# Patient Record
Sex: FEMALE | Race: WHITE | NOT HISPANIC OR LATINO | Employment: OTHER | URBAN - METROPOLITAN AREA
[De-identification: names, ages, dates, MRNs, and addresses within clinical notes are randomized per-mention and may not be internally consistent; named-entity substitution may affect disease eponyms.]

---

## 2017-01-03 ENCOUNTER — APPOINTMENT (OUTPATIENT)
Dept: RADIOLOGY | Facility: MEDICAL CENTER | Age: 57
DRG: 388 | End: 2017-01-03
Attending: EMERGENCY MEDICINE
Payer: MEDICARE

## 2017-01-03 ENCOUNTER — RESOLUTE PROFESSIONAL BILLING HOSPITAL PROF FEE (OUTPATIENT)
Dept: HOSPITALIST | Facility: MEDICAL CENTER | Age: 57
End: 2017-01-03
Payer: MEDICARE

## 2017-01-03 ENCOUNTER — HOSPITAL ENCOUNTER (INPATIENT)
Facility: MEDICAL CENTER | Age: 57
LOS: 2 days | DRG: 388 | End: 2017-01-05
Attending: EMERGENCY MEDICINE | Admitting: HOSPITALIST
Payer: MEDICARE

## 2017-01-03 DIAGNOSIS — Z94.4 S/P LIVER TRANSPLANT (HCC): ICD-10-CM

## 2017-01-03 DIAGNOSIS — K56.609 SBO (SMALL BOWEL OBSTRUCTION) (HCC): ICD-10-CM

## 2017-01-03 LAB
ALBUMIN SERPL BCP-MCNC: 4.3 G/DL (ref 3.2–4.9)
ALBUMIN/GLOB SERPL: 2.2 G/DL
ALP SERPL-CCNC: 27 U/L (ref 30–99)
ALT SERPL-CCNC: 17 U/L (ref 2–50)
ANION GAP SERPL CALC-SCNC: 8 MMOL/L (ref 0–11.9)
AST SERPL-CCNC: 19 U/L (ref 12–45)
BASOPHILS # BLD AUTO: 0.4 % (ref 0–1.8)
BASOPHILS # BLD: 0.02 K/UL (ref 0–0.12)
BILIRUB SERPL-MCNC: 0.4 MG/DL (ref 0.1–1.5)
BUN SERPL-MCNC: 18 MG/DL (ref 8–22)
CALCIUM SERPL-MCNC: 9.9 MG/DL (ref 8.5–10.5)
CHLORIDE SERPL-SCNC: 99 MMOL/L (ref 96–112)
CO2 SERPL-SCNC: 30 MMOL/L (ref 20–33)
CREAT SERPL-MCNC: 1.21 MG/DL (ref 0.5–1.4)
EOSINOPHIL # BLD AUTO: 0.11 K/UL (ref 0–0.51)
EOSINOPHIL NFR BLD: 2.2 % (ref 0–6.9)
ERYTHROCYTE [DISTWIDTH] IN BLOOD BY AUTOMATED COUNT: 44.8 FL (ref 35.9–50)
GFR SERPL CREATININE-BSD FRML MDRD: 46 ML/MIN/1.73 M 2
GLOBULIN SER CALC-MCNC: 2 G/DL (ref 1.9–3.5)
GLUCOSE SERPL-MCNC: 135 MG/DL (ref 65–99)
HCT VFR BLD AUTO: 38.7 % (ref 37–47)
HGB BLD-MCNC: 12.9 G/DL (ref 12–16)
IMM GRANULOCYTES # BLD AUTO: 0.02 K/UL (ref 0–0.11)
IMM GRANULOCYTES NFR BLD AUTO: 0.4 % (ref 0–0.9)
LIPASE SERPL-CCNC: 10 U/L (ref 11–82)
LYMPHOCYTES # BLD AUTO: 1.01 K/UL (ref 1–4.8)
LYMPHOCYTES NFR BLD: 20.3 % (ref 22–41)
MCH RBC QN AUTO: 28.1 PG (ref 27–33)
MCHC RBC AUTO-ENTMCNC: 33.3 G/DL (ref 33.6–35)
MCV RBC AUTO: 84.3 FL (ref 81.4–97.8)
MONOCYTES # BLD AUTO: 0.35 K/UL (ref 0–0.85)
MONOCYTES NFR BLD AUTO: 7 % (ref 0–13.4)
NEUTROPHILS # BLD AUTO: 3.46 K/UL (ref 2–7.15)
NEUTROPHILS NFR BLD: 69.7 % (ref 44–72)
NRBC # BLD AUTO: 0 K/UL
NRBC BLD AUTO-RTO: 0 /100 WBC
PLATELET # BLD AUTO: 82 K/UL (ref 164–446)
PMV BLD AUTO: 8.9 FL (ref 9–12.9)
POTASSIUM SERPL-SCNC: 3.5 MMOL/L (ref 3.6–5.5)
PROT SERPL-MCNC: 6.3 G/DL (ref 6–8.2)
RBC # BLD AUTO: 4.59 M/UL (ref 4.2–5.4)
SODIUM SERPL-SCNC: 137 MMOL/L (ref 135–145)
WBC # BLD AUTO: 5 K/UL (ref 4.8–10.8)

## 2017-01-03 PROCEDURE — 99285 EMERGENCY DEPT VISIT HI MDM: CPT

## 2017-01-03 PROCEDURE — 700101 HCHG RX REV CODE 250: Performed by: HOSPITALIST

## 2017-01-03 PROCEDURE — 700111 HCHG RX REV CODE 636 W/ 250 OVERRIDE (IP): Performed by: HOSPITALIST

## 2017-01-03 PROCEDURE — 700111 HCHG RX REV CODE 636 W/ 250 OVERRIDE (IP): Performed by: NURSE PRACTITIONER

## 2017-01-03 PROCEDURE — 302128 INFUSION PUMP: Performed by: EMERGENCY MEDICINE

## 2017-01-03 PROCEDURE — 36415 COLL VENOUS BLD VENIPUNCTURE: CPT

## 2017-01-03 PROCEDURE — 700105 HCHG RX REV CODE 258: Performed by: EMERGENCY MEDICINE

## 2017-01-03 PROCEDURE — 304538 HCHG NG TUBE

## 2017-01-03 PROCEDURE — 96361 HYDRATE IV INFUSION ADD-ON: CPT

## 2017-01-03 PROCEDURE — 700111 HCHG RX REV CODE 636 W/ 250 OVERRIDE (IP): Performed by: EMERGENCY MEDICINE

## 2017-01-03 PROCEDURE — 99223 1ST HOSP IP/OBS HIGH 75: CPT | Mod: AI | Performed by: HOSPITALIST

## 2017-01-03 PROCEDURE — 85025 COMPLETE CBC W/AUTO DIFF WBC: CPT

## 2017-01-03 PROCEDURE — 96376 TX/PRO/DX INJ SAME DRUG ADON: CPT

## 2017-01-03 PROCEDURE — 83690 ASSAY OF LIPASE: CPT

## 2017-01-03 PROCEDURE — 96374 THER/PROPH/DIAG INJ IV PUSH: CPT

## 2017-01-03 PROCEDURE — 96375 TX/PRO/DX INJ NEW DRUG ADDON: CPT

## 2017-01-03 PROCEDURE — 700117 HCHG RX CONTRAST REV CODE 255: Performed by: EMERGENCY MEDICINE

## 2017-01-03 PROCEDURE — 770006 HCHG ROOM/CARE - MED/SURG/GYN SEMI*

## 2017-01-03 PROCEDURE — 74177 CT ABD & PELVIS W/CONTRAST: CPT

## 2017-01-03 PROCEDURE — 304561 HCHG STAT O2

## 2017-01-03 PROCEDURE — 80053 COMPREHEN METABOLIC PANEL: CPT

## 2017-01-03 RX ORDER — ONDANSETRON 4 MG/1
4 TABLET, ORALLY DISINTEGRATING ORAL EVERY 4 HOURS PRN
Status: DISCONTINUED | OUTPATIENT
Start: 2017-01-03 | End: 2017-01-05 | Stop reason: HOSPADM

## 2017-01-03 RX ORDER — MORPHINE SULFATE 10 MG/ML
10 INJECTION, SOLUTION INTRAMUSCULAR; INTRAVENOUS ONCE
Status: COMPLETED | OUTPATIENT
Start: 2017-01-03 | End: 2017-01-03

## 2017-01-03 RX ORDER — DIPHENHYDRAMINE HYDROCHLORIDE 50 MG/ML
25 INJECTION INTRAMUSCULAR; INTRAVENOUS ONCE
Status: COMPLETED | OUTPATIENT
Start: 2017-01-03 | End: 2017-01-03

## 2017-01-03 RX ORDER — FUROSEMIDE 40 MG/1
40 TABLET ORAL EVERY MORNING
COMMUNITY
End: 2017-07-06

## 2017-01-03 RX ORDER — ONDANSETRON 2 MG/ML
4 INJECTION INTRAMUSCULAR; INTRAVENOUS ONCE
Status: COMPLETED | OUTPATIENT
Start: 2017-01-03 | End: 2017-01-03

## 2017-01-03 RX ORDER — HYDROMORPHONE HYDROCHLORIDE 2 MG/ML
4 INJECTION, SOLUTION INTRAMUSCULAR; INTRAVENOUS; SUBCUTANEOUS EVERY 4 HOURS PRN
Status: DISCONTINUED | OUTPATIENT
Start: 2017-01-03 | End: 2017-01-03

## 2017-01-03 RX ORDER — LORAZEPAM 2 MG/ML
1 INJECTION INTRAMUSCULAR EVERY 4 HOURS PRN
Status: DISCONTINUED | OUTPATIENT
Start: 2017-01-03 | End: 2017-01-05 | Stop reason: HOSPADM

## 2017-01-03 RX ORDER — PROMETHAZINE HYDROCHLORIDE 25 MG/1
12.5-25 SUPPOSITORY RECTAL EVERY 4 HOURS PRN
Status: DISCONTINUED | OUTPATIENT
Start: 2017-01-03 | End: 2017-01-05 | Stop reason: HOSPADM

## 2017-01-03 RX ORDER — TADALAFIL 20 MG/1
40 TABLET ORAL
COMMUNITY
End: 2017-07-24 | Stop reason: SDUPTHER

## 2017-01-03 RX ORDER — SODIUM CHLORIDE AND POTASSIUM CHLORIDE 150; 900 MG/100ML; MG/100ML
INJECTION, SOLUTION INTRAVENOUS CONTINUOUS
Status: DISCONTINUED | OUTPATIENT
Start: 2017-01-03 | End: 2017-01-05 | Stop reason: HOSPADM

## 2017-01-03 RX ORDER — MORPHINE SULFATE 4 MG/ML
1-2 INJECTION, SOLUTION INTRAMUSCULAR; INTRAVENOUS EVERY 4 HOURS PRN
Status: DISCONTINUED | OUTPATIENT
Start: 2017-01-03 | End: 2017-01-04

## 2017-01-03 RX ORDER — DOCUSATE SODIUM 100 MG/1
100 CAPSULE, LIQUID FILLED ORAL 2 TIMES DAILY
COMMUNITY
End: 2017-12-29

## 2017-01-03 RX ORDER — AMOXICILLIN 500 MG/1
2000 CAPSULE ORAL ONCE
Status: ON HOLD | COMMUNITY
End: 2017-01-05

## 2017-01-03 RX ORDER — PROMETHAZINE HYDROCHLORIDE 25 MG/1
12.5-25 TABLET ORAL EVERY 4 HOURS PRN
Status: DISCONTINUED | OUTPATIENT
Start: 2017-01-03 | End: 2017-01-05 | Stop reason: HOSPADM

## 2017-01-03 RX ORDER — ONDANSETRON 2 MG/ML
4 INJECTION INTRAMUSCULAR; INTRAVENOUS EVERY 4 HOURS PRN
Status: DISCONTINUED | OUTPATIENT
Start: 2017-01-03 | End: 2017-01-05 | Stop reason: HOSPADM

## 2017-01-03 RX ORDER — SODIUM CHLORIDE 9 MG/ML
1000 INJECTION, SOLUTION INTRAVENOUS ONCE
Status: COMPLETED | OUTPATIENT
Start: 2017-01-03 | End: 2017-01-03

## 2017-01-03 RX ORDER — AMBRISENTAN 10 MG/1
10 TABLET, FILM COATED ORAL DAILY
COMMUNITY
End: 2017-01-11 | Stop reason: SDUPTHER

## 2017-01-03 RX ORDER — MYCOPHENOLATE MOFETIL 250 MG/1
500 CAPSULE ORAL EVERY 12 HOURS
Status: ON HOLD | COMMUNITY
End: 2021-08-03

## 2017-01-03 RX ADMIN — IOHEXOL 100 ML: 350 INJECTION, SOLUTION INTRAVENOUS at 07:52

## 2017-01-03 RX ADMIN — HYDROMORPHONE HYDROCHLORIDE 1 MG: 1 INJECTION, SOLUTION INTRAMUSCULAR; INTRAVENOUS; SUBCUTANEOUS at 07:16

## 2017-01-03 RX ADMIN — ENOXAPARIN SODIUM 40 MG: 100 INJECTION SUBCUTANEOUS at 17:24

## 2017-01-03 RX ADMIN — POTASSIUM CHLORIDE AND SODIUM CHLORIDE: 900; 150 INJECTION, SOLUTION INTRAVENOUS at 16:53

## 2017-01-03 RX ADMIN — ONDANSETRON 4 MG: 2 INJECTION, SOLUTION INTRAMUSCULAR; INTRAVENOUS at 11:35

## 2017-01-03 RX ADMIN — SODIUM CHLORIDE 1000 ML: 9 INJECTION, SOLUTION INTRAVENOUS at 06:35

## 2017-01-03 RX ADMIN — MORPHINE SULFATE 2 MG: 4 INJECTION INTRAVENOUS at 20:41

## 2017-01-03 RX ADMIN — HYDROMORPHONE HYDROCHLORIDE 1 MG: 1 INJECTION, SOLUTION INTRAMUSCULAR; INTRAVENOUS; SUBCUTANEOUS at 06:35

## 2017-01-03 RX ADMIN — DIPHENHYDRAMINE HYDROCHLORIDE 25 MG: 50 INJECTION INTRAMUSCULAR; INTRAVENOUS at 22:54

## 2017-01-03 RX ADMIN — MORPHINE SULFATE 2 MG: 4 INJECTION INTRAVENOUS at 15:11

## 2017-01-03 RX ADMIN — LORAZEPAM 1 MG: 2 INJECTION INTRAMUSCULAR; INTRAVENOUS at 20:48

## 2017-01-03 RX ADMIN — MORPHINE SULFATE 5 MG: 10 INJECTION INTRAVENOUS at 11:37

## 2017-01-03 RX ADMIN — ONDANSETRON 4 MG: 2 INJECTION, SOLUTION INTRAMUSCULAR; INTRAVENOUS at 06:35

## 2017-01-03 RX ADMIN — HYDROCORTISONE SODIUM SUCCINATE 50 MG: 100 INJECTION, POWDER, FOR SOLUTION INTRAMUSCULAR; INTRAVENOUS at 20:38

## 2017-01-03 RX ADMIN — MORPHINE SULFATE 5 MG: 10 INJECTION INTRAVENOUS at 08:30

## 2017-01-03 ASSESSMENT — PAIN SCALES - GENERAL
PAINLEVEL_OUTOF10: 7
PAINLEVEL_OUTOF10: 8
PAINLEVEL_OUTOF10: 10
PAINLEVEL_OUTOF10: 7

## 2017-01-03 ASSESSMENT — ENCOUNTER SYMPTOMS
COUGH: 0
SHORTNESS OF BREATH: 1
NAUSEA: 1
DIARRHEA: 1
VOMITING: 1
ABDOMINAL PAIN: 1

## 2017-01-03 ASSESSMENT — LIFESTYLE VARIABLES
ALCOHOL_USE: NO
EVER_SMOKED: NEVER

## 2017-01-03 NOTE — ED NOTES
Pt with emesis, NG irrigated , + gastric contents aspirated, NG to LCWS at this time with gastric contents noted

## 2017-01-03 NOTE — ED NOTES
"Another call placed to [facility], \"Kylie: @ [phone], .  Advised Dr. Lee Ann tovar for transfer that isn't emergent can take days and to admit pt in interim is advised.  "

## 2017-01-03 NOTE — ED NOTES
"Med rec updated and complete  Allergies reviewed  Pts  had a medication list at bedside, went over medications, returned medication list back to pts .  Pts  states \"I brought in her LETAIRIS 10MG and ADCIRCA 20MG\".      "

## 2017-01-03 NOTE — IP AVS SNAPSHOT
[employer] Access Code: Activation code not generated  Current [employer] Status: Active    WEMShart  A secure, online tool to manage your health information     [employer]’s [employer]® is a secure, online tool that connects you to your personalized health information from the privacy of your home -- day or night - making it very easy for you to manage your healthcare. Once the activation process is completed, you can even access your medical information using the [employer] kacy, which is available for free in the Apple Kacy store or Google Play store.     [employer] provides the following levels of access (as shown below):   My Chart Features   [facility] Primary Care Doctor [facility]  Specialists [facility]  Urgent  Care Non-[facility]  Primary Care  Doctor   Email your healthcare team securely and privately 24/7 X X X X   Manage appointments: schedule your next appointment; view details of past/upcoming appointments X      Request prescription refills. X      View recent personal medical records, including lab and immunizations X X X X   View health record, including health history, allergies, medications X X X X   Read reports about your outpatient visits, procedures, consult and ER notes X X X X   See your discharge summary, which is a recap of your hospital and/or ER visit that includes your diagnosis, lab results, and care plan. X X       How to register for [employer]:  1. Go to  [email or web address].  2. Click on the Sign Up Now box, which takes you to the New Member Sign Up page. You will need to provide the following information:  a. Enter your [employer] Access Code exactly as it appears at the top of this page. (You will not need to use this code after you’ve completed the sign-up process. If you do not sign up before the expiration date, you must request a new code.)   b. Enter your date of birth.   c. Enter your home email address.   d. Click Submit, and follow the next screen’s instructions.  3. Create a [employer] ID. This will  be your EVOFEM login ID and cannot be changed, so think of one that is secure and easy to remember.  4. Create a EV[employer]EM password. You can change your password at any time.  5. Enter your Password Reset Question and Answer. This can be used at a later time if you forget your password.   6. Enter your e-mail address. This allows you to receive e-mail notifications when new information is available in EVOFEM.  7. Click Sign Up. You can now view your health information.    For assistance activating your EV[employer]EM account, call [phone]

## 2017-01-03 NOTE — ED NOTES
Have updated  x 3 regarding delays, brought ice water to him, offered coffee and a meal. He is very frustrated with delays.  Pt has been vomiting, have disposed of soiled bags, offered encouragement,  Cold wash cloth and mouth swabs have been provided.  Update with admitting MD that he is currently reviewing MR and pt and  made aware of that.

## 2017-01-03 NOTE — ED NOTES
Pt was to be transferred , now going to be admitted here, awaiting admit orders , pt and  updated on plan of care

## 2017-01-03 NOTE — CONSULTS
DATE OF SERVICE:  01/03/2017    REQUESTING PHYSICIAN:  Chiquita Nance DO, MD, ER physician, [facility].    REASON FOR CONSULTATION:  Possible early small-bowel obstruction.    HISTORY OF PRESENT ILLNESS:  The patient is a 56-year-old female with a   complicated medical and surgical history.  She states that she began   experiencing severe abdominal pain last night periumbilical in nature.  She   states she had chills and a low grade fever to 100 measured only in the   hospital, she states she has had multiple episodes of vomiting green and   yellow material.  She states she had diarrhea over 2 over the last 4 days,   although she has had no bowel movement in the last 2 days.  She states,   however, that she is still passing flatus.  She has had no previous episodes   of similar complaints and no one else is sick at home.  She presented to the   ER where a CT scan showed evidence of a small-bowel obstruction.    PAST MEDICAL HISTORY:  1.  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  2.  Hypoxia.  3.  Aortic valve stenosis for which she is being followed by Dr. Li.  4.  Orthostatic hypotension.  5.  Hypertension.  6.  Pulmonary hypertension.  7.  3 episodes of systemic inflammatory response syndrome.  8.  Sarcoidosis.  9.  History of septic shock in 2012 for an unknown cause.  10.  Multiple episodes of pneumonia.  11.  Hospital-acquired VRE and Clostridium difficile.  12.  An enlarged heart.  13.  An enlarged spleen.  14.  Type 2 diabetes.  15.  Chronic nausea and vomiting.  16.  Chronic hip, spine, and head pain.  17.  Chronic fatigue.    ALLERGIES:  No known drug allergies.    PAST SURGICAL HISTORY:  She had a liver transplant in 2011 due to either   alcohol or sarcoidosis.  She had a gastric sleeve performed in 2015.  She had   a right heart catheterization performed in 2014.  She has a liver biopsy in    2012.  She has had a hemorrhoidectomy in 2010 and a cholecystectomy in 2010   and an umbilical hernia repair x3 in the past, sinus repair x3.  She has had a   breast reduction, and carpal tunnel release.    MEDICATIONS:  1.  Tadalafil.  2.  Aspirin.  3.  CellCept.  4.  Cyclobenzaprine.  5.  Flexeril.  6.  Dilaudid 8 mg tablets 1 p.o. 3 times per day.  7.  Flonase.  8.  Gabapentin.  9.  Lantus insulin.  10.  Humalog insulin.  11.  Letairis.  12.  Lasix.  13.  Levothyroxine.  14.  Oxycodone 10-30 mg p.o. p.r.n. pain.  She states she takes this 3 times   per day.  15.  Pravastatin.  16.  Prednisone.  17.  Prilosec.  18.  Prograf.  19.  Spiriva.  20.  Trazodone.  21.  Xanax.  22.  Zofran.  23.  Multiple over-the-counter medications.    SOCIAL HISTORY:  She denies any history of smoking.  She states she quit   alcohol use 8 years ago.  She is somewhat vague in how much alcohol she drank   prior to quitting.  She denies IV drug use.  She states she history previous    of [facility].  She is accompanied by her .    FAMILY HISTORY:  Noncontributory.    REVIEW OF SYSTEMS:  A 16-point review of systems is negative except as noted   in the HPI.    PHYSICAL EXAMINATION:  VITAL SIGNS:  Temperature at 05:05 this morning was 37.4, blood pressure   120/64 at 11:00, pulse 83 at 11:00, O2 saturation is 94% on 2 liters via nasal   cannula.  GENERAL:  She is in no acute distress and appears her stated age.  HEENT:  Pupils equal, round, reactive to light.  She has moist oral mucosa and   adequate upper and lower dentition.  NECK:  Supple.  No JVD, no lymphadenopathy, no thyromegaly.  LUNGS:  Clear to auscultation bilaterally with normal bilateral chest rise.  HEART:  Has a regular rate and rhythm.  No murmurs, gallops or rubs are   appreciated.  ABDOMEN:  Nondistended, has positive decreased bowel sounds, is soft and is   minimally tender throughout.  She has no umbilical, or epigastric, or inguinal   hernias appreciated on  exam.  RECTAL:  Deferred.  PELVIC:  Deferred.  EXTREMITIES:  1+ pulses in all 4 extremities.  No clubbing, cyanosis or edema.  NEUROLOGIC:  Cranial nerves II-XII are grossly intact.  She has no focal   deficits and her gait is not examined.    LABORATORY DATA:  CBC obtained this morning at 05:13 shows no elevation in her   white blood cell count and no left shift.  She does have an MCHC which is low   at 33.3.  Her platelet count is low at 82,000.  A complete metabolic panel   was remarkable for a low potassium at 3.5 and elevated blood glucose at 135   and a low alkaline phosphatase at 27 and a low lipase at 20.  CT of the   abdomen and pelvis shows small bowel dilatation with evidence of small bowel   stasis in the proximal jejunum transition point in the left upper quadrant   suggestive of at least a partial small-bowel obstruction.    ASSESSMENT:  A 56-year-old female with multiple medical and surgical problems   to include constricted bronchiolitis, chronic hypoxia, aortic valve stenosis,   orthostatic hypotension, hypertension, pulmonary hypertension, sarcoidosis, an   enlarged heart, an enlarged spleen, type 2 diabetes, chronic nausea and   vomiting, chronic fatigue, chronic hip, spine, and head pain now noted to have   thrombocytopenia, hypokalemia, and evidence of at least a partial small-bowel   obstruction.  She has also undergone multiple abdominal surgeries including   most importantly a liver transplant in 2011 due to her liver transplant she is   on multiple antirejection drugs.  She states that she does not trust [facility] as she states that multiple mistakes had been made in   her care in the past.  She would like to go to [facility] to be treated.  I think   especially given her very complicated surgical and medical history and the   fact that we do not have a dedicated transplant team here to manage her   antirejection medications that this is reasonable plan and I recommend that    she be transferred to [facility], if they are willing to take her.    PLAN:  I will discuss again with the ER physician.  My recommendation that she   be transferred down to [facility].  I will be available if she is not able to be   transferred there, if she needs to be placed on the medicine service with   surgical consultation.       ____________________________________     MD ANDREW MONTELONGO / SAVAGE    DD:  01/03/2017 11:32:13  DT:  01/03/2017 12:44:20    D#:  [id number]  Job#:  [id number]

## 2017-01-03 NOTE — IP AVS SNAPSHOT
" <p align=\"LEFT\"><IMG SRC=\"//EMRWB/blob$/[email or web address]\" alt=\"Image\" WIDTH=\"50%\" HEIGHT=\"200\" BORDER=\"\"></p>                   Name:Roxana Cuba  Medical Record Number:[id number]  CSN: [id number]    YOB: 1960   Age: 56 y.o.  Sex: female  HT:1.727 m (5' 8\") WT: 80.74 kg (178 lb)          Admit Date: 1/3/2017     Discharge Date:   Today's Date: 1/5/2017  Attending Doctor:  Sebas Odell M.D.                  Allergies:  Rhubarb and Vancomycin hcl          Your appointments     Jan 11, 2017  8:00 AM   Adult Draw/Collection with [facility]   [facility] (--)    [address]  [place] [address]   [phone]            Jan 16, 2017 10:15 AM   ECHO with ECHO [facility]   ECHOCARDIOLOGY [facility]    [address]  [place] [address]   [phone]           No prep            Jan 18, 2017  8:30 AM   MA DX30 with S [facility]CCARR[facility] MG 1   [facility] IMAGING [facility]    [address]  [place] [address]   [phone]            Jan 20, 2017  1:20 PM   Access New To You with Bernarda Reyes D.O.   [facility])    [facility], [address]  [place] [id number] [phone]            Feb 09, 2017  1:00 PM   Diabetes Care Visit with Bernarda Reyes D.O., [facility] DIABETES RN   [facility])    [address] [place] [address], [address]  [place] [id number] [phone]           You will be receiving a confirmation call a few days before your appointment from our automated call confirmation system.            Feb 15, 2017  8:00 AM   Adult Draw/Collection with LAB [facility]   [facility] (--)    [address]  [place] [address]   [phone]            Mar 08, 2017  8:00 AM   Adult Draw/Collection with [facility]   [facility] (--)    [address]  [place] [address]   [phone]            Apr 03, 2017 10:30 AM   FOLLOW UP with Maxwell Phan M.D.   [facility] (--)    [address] , [facility] [address]  [place] [id number] [phone]              Follow-up Information     1. Follow up with Bernarda Reyes D.O..    Specialty:  Family Medicine    Contact information    [address]  [address]  [place] [address] [phone]          2. Follow up with Bernarda Reyes D.O..    Specialty:  Family Medicine    Contact information    [address]  [place] [address] [phone]           Medication List      Take these Medications        Instructions    ADCIRCA 20 MG Tabs   Generic drug:  Tadalafil (PAH)    Take 40 mg by mouth every evening.   Dose:  40 mg       aspirin EC 81 MG Tbec   Commonly known as:  ECOTRIN    Take 81 mg by mouth every morning.   Dose:  81 mg       BARIATRIC FUSION Chew    Take 1 Tab by mouth every evening.   Dose:  1 Tab       CELLCEPT 250 MG Caps   Generic drug:  mycophenolate    Take 500 mg by mouth 2 times a day.   Dose:  500 mg       CITRACAL + D PO    Take 1 Tab by mouth 2 Times a Day. @@ 0700 and 1800   Dose:  1 Tab       cyclobenzaprine 10 MG Tabs   Commonly known as:  FLEXERIL    TAKE 1 TABLET BY MOUTH 3 TIMES A DAY AS NEEDED FOR MUSCLE SPASMS       docusate sodium 100 MG Caps   Commonly known as:  COLACE    Take 100 mg by mouth 2 times a day.   Dose:  100 mg       furosemide 40 MG Tabs   Commonly known as:  LASIX    Take 20 mg by mouth every day.   Dose:  20 mg       gabapentin 600 MG tablet   Commonly known as:  NEURONTIN    Doctor's comments:  Authorization of a refill request.   TAKE 1 TABLET BY MOUTH 3 TIMES A DAY       insulin glargine 100 UNIT/ML Soln   Commonly known as:  LANTUS    Inject 8 Units as instructed every morning.   Dose:  8 Units       LETAIRIS 10 MG tablet   Generic drug:  ambrisentan    Take 10 mg by mouth every day.   Dose:  10 mg       levothyroxine 137 MCG Tabs   Commonly known as:  SYNTHROID    Doctor's comments:  This is an authorization for a requested refill   TAKE 1 TABLET BY MOUTH  EVERY DAY       omeprazole 40 MG delayed-release capsule   Commonly known as:  PRILOSEC    Take 1 Cap by mouth every day.   Dose:  40 mg       ondansetron 4 MG Tbdp   What changed:    - reasons to take this  - additional instructions   Commonly known as:  ZOFRAN ODT    Take 1 Tab by mouth every 8 hours as needed. Nausea and vomiting   Dose:  4 mg       oxycodone immediate release 10 MG immediate release tablet   Commonly known as:  ROXICODONE    Take 10 mg by mouth 3 times a day. @0700, 1200, and 2000   Dose:  10 mg       polyethylene glycol 3350 Powd   Commonly known as:  MIRALAX    Take 17 g by mouth every day.   Dose:  17 g       pravastatin 20 MG Tabs   Commonly known as:  PRAVACHOL    Take 1 Tab by mouth every day.   Dose:  20 mg       * PredniSONE 2 MG Tbec   What changed:  Another medication with the same name was changed. Make sure you understand how and when to take each.    Take 2 mg by mouth every day. Pt has another RX for 5MG Pt takes total of 7MG   Dose:  2 mg       * predniSONE 5 MG Tabs   What changed:    - how much to take  - how to take this  - when to take this  - additional instructions   Commonly known as:  SHAHEED    Doctor's comments:  In addition to prednisone 1 mg prescription   1 to 4 tabs by mouth daily as directed       PROBIOTIC DAILY PO    Take 1 Cap by mouth every evening.   Dose:  1 Cap       sennosides 8.6 MG Tabs   Commonly known as:  SENOKOT    Take 17.2 mg by mouth 2 times a day.   Dose:  17.2 mg       sertraline 100 MG Tabs   Commonly known as:  ZOLOFT    Take 1 Tab by mouth every evening.   Dose:  100 mg       tacrolimus 1 MG Caps   Commonly known as:  PROGRAF    Take 1.5-2 mg by mouth 2 times a day. Pt takes: 2MG in AM 1.5MG HS   Dose:  1.5-2 mg       trazodone 50 MG Tabs   What changed:    - when to take this  - reasons to take this   Commonly known as:  DESYREL    Take 1-2 Tabs by mouth every bedtime.   Dose:   mg       * Notice:  This list has 2 medication(s) that  are the same as other medications prescribed for you. Read the directions carefully, and ask your doctor or other care provider to review them with you.

## 2017-01-03 NOTE — ED NOTES
"Late entry.  Approx 0845 Dr. Nance informs a transfer to [facility] may be needed.  Call to \"Kirsty\" @ [facility]/HHP, 5762, LM and basic info, await call back.  Pt updated on POC, she is agitated, upset,  @ bedside.  Dr. Nance updated, primary RN \"aJne\" updated.  "

## 2017-01-03 NOTE — ED NOTES
"Pt with multiple requests for pain medication, pt's O2 sats falling below 82% intermittently on 6L when pt dozes off , ERP aware , pt states \" this happens all the time , it's normal for me \" , pt\" wanting more pain medication regardless\",   "

## 2017-01-03 NOTE — ED NOTES
"Pt medicated as ordered, pt demanding \"more pain medication if needed\" , explained to pt her low O2 sats and increased O2 requirements , pt states \" I don't fucking care, I would rather be intubated than be in pain\" , this RN explained to pt that I could only medicate as the ERP has prescribed, pt states \" I don't care, I will request pain medication as often as I want\"   "

## 2017-01-03 NOTE — ED NOTES
"Pt on call light, on entering pt is crying in pain.  Informed pt that she received pain medication not to long ago and is requiring increased oxygen to maintain saturations.  Pt states that she is in too much pain and has lots of questions and would like to see the doctor.  Pt informed that she has a bowel obstructions and needs an NG tube, pt agitated by this and is asking why they \"can't just operate\". Pt also wants to know when \"i will be transferring to [facility]\"  I informed her that I will get our  to talked to her about transfer  "

## 2017-01-03 NOTE — ED NOTES
Pt's  out to nurses station requesting pt to take her home medications, pt mota stanford NG tube in place and is NPO , I addressed the pt's concerns with  who asked that I speak with pharmacy regarding availability of home meds in non oral form. Armaan from pharmacy looking into pt's medications

## 2017-01-03 NOTE — ED NOTES
Chief Complaint   Patient presents with   • RUQ Pain     Pt reports symptoms started last night.   • Nausea/Vomiting/Diarrhea     /56 mmHg  Pulse 75  Temp(Src) 37.4 °C (99.3 °F)  Resp 18  Wt 80.74 kg (178 lb)  SpO2 99%    Pt brought in by MAYRA from home, pt currently taking anti-rejection medication s/p liver transplant. PIV line established pta. Placed in room RD 3. Complaints and vitals as above. Pt on monitors, all alarms audible. Chart flagged for ERP to see.

## 2017-01-03 NOTE — IP AVS SNAPSHOT
" After Visit Summary                                                                                                                  Name:Roxana Cuba  Medical Record Number:[id number]  CSN: [id number]    YOB: 1960   Age: 56 y.o.  Sex: female  HT:1.727 m (5' 8\") WT: 80.74 kg (178 lb)          Admit Date: 1/3/2017     Discharge Date:   Today's Date: 1/5/2017  Attending Doctor:  Sebas Odell M.D.                  Allergies:  Rhubarb and Vancomycin hcl            Discharge Instructions       Discharge Instructions    Discharged to home by car with relative. Discharged via wheelchair, hospital escort: Yes.  Special equipment needed: Oxygen    Be sure to schedule a follow-up appointment with your primary care doctor or any specialists as instructed.     Discharge Plan:   Diet Plan: Discussed (warm liquids today increase as tolerated)  Activity Level: Discussed (no strenuous activity)  Confirmed Follow up Appointment: Patient to Call and Schedule Appointment  Confirmed Symptoms Management: Discussed  Medication Reconciliation Updated: Yes  Pneumococcal Vaccine Given - only chart on this line when given: Given (See MAR)  Influenza Vaccine Indication: Not indicated: Previously immunized this influenza season and > 8 years of age    I understand that a diet low in cholesterol, fat, and sodium is recommended for good health. Unless I have been given specific instructions below for another diet, I accept this instruction as my diet prescription.   Other diet: warm liquid diet today and increase as tolerated    Special Instructions: None    · Is patient discharged on Warfarin / Coumadin?   No     · Is patient Post Blood Transfusion?  No  Follow up with jose PISANO As needed or return to the ED for return of symptoms, be sure to continue all laxatives to keep your bowels moving.     Depression / Suicide Risk    As you are discharged from this [facility] facility, it is important to learn how to keep safe from " harming yourself.    Recognize the warning signs:  · Abrupt changes in personality, positive or negative- including increase in energy   · Giving away possessions  · Change in eating patterns- significant weight changes-  positive or negative  · Change in sleeping patterns- unable to sleep or sleeping all the time   · Unwillingness or inability to communicate  · Depression  · Unusual sadness, discouragement and loneliness  · Talk of wanting to die  · Neglect of personal appearance   · Rebelliousness- reckless behavior  · Withdrawal from people/activities they love  · Confusion- inability to concentrate     If you or a loved one observes any of these behaviors or has concerns about self-harm, here's what you can do:  · Talk about it- your feelings and reasons for harming yourself  · Remove any means that you might use to hurt yourself (examples: pills, rope, extension cords, firearm)  · Get professional help from the community (Mental Health, Substance Abuse, psychological counseling)  · Do not be alone:Call your Safe Contact- someone whom you trust who will be there for you.  · Call your local CRISIS HOTLINE 103-7365 or 614-453-4309  · Call your local Children's Mobile Crisis Response Team [place] (384) 030-5546 or www.[employer]Mark[employer]  · Call the toll free National Suicide Prevention Hotlines   · National Suicide Prevention Lifeline 396-914-TGVQ (7883)  · National Hope Line Network 800-SUICIDE (699-0627)        Your appointments     Jan 11, 2017  8:00 AM   Adult Draw/Collection with [facility]   [facility] (--)    [address]  [place] [address]   [phone]            Jan 16, 2017 10:15 AM   ECHO with ECHO [facility]   ECHOCARDIOLOGY [facility])    [address]  [place] [address]   [phone]           No prep            Jan 18, 2017  8:30 AM   MA DX30 with ED PAREDES MG 1   [facility] ([place])    [address]  [place] [address]      [phone]            Jan 20, 2017  1:20 PM   Access New To You with Bernarda Reyes D.O.   [facility])    [address], [address]  [place] [address]   [phone]            Feb 09, 2017  1:00 PM   Diabetes Care Visit with Bernarda Reyes D.O., [facility] OLSEN DIABETES RN   [facility])    [facility], [address]  [place] [address] [phone]           You will be receiving a confirmation call a few days before your appointment from our automated call confirmation system.            Feb 15, 2017  8:00 AM   Adult Draw/Collection with LAB [facility]   [facility] (--)    [address]  [place] [address]   [phone]            Mar 08, 2017  8:00 AM   Adult Draw/Collection with LAB [facility]   [facility] (--)    [address]  [place] [address]   [phone]            Apr 03, 2017 10:30 AM   FOLLOW UP with Maxwell Phan M.D.   [facility] (--)    [address] [place] [address] [address]  [place] [id number] [phone]              Follow-up Information     1. Follow up with Bernarda Reyes D.O..    Specialty:  Family Medicine    Contact information    [address] [place] [address]  [address]  [place] [address] [phone]          2. Follow up with Bernarda Reyes D.O..    Specialty:  Family Medicine    Contact information    [address] [place] [address] [address]  [place] [address] [phone]           Discharge Medication Instructions:    Below are the medications your physician expects you to take upon discharge:    Review all your home medications and newly ordered medications with your doctor and/or pharmacist. Follow medication instructions as directed by your doctor and/or pharmacist.    Please keep your medication list with you and share with your physician.               Medication List      CHANGE how you take these medications        Instructions    ondansetron 4 MG Tbdp   What changed:    -  reasons to take this  - additional instructions   Commonly known as:  ZOFRAN ODT   Next Dose Due:  As needed    Take 1 Tab by mouth every 8 hours as needed. Nausea and vomiting   Dose:  4 mg       * PredniSONE 2 MG Tbec   What changed:  Another medication with the same name was changed. Make sure you understand how and when to take each.   Next Dose Due:  today    Take 2 mg by mouth every day. Pt has another RX for 5MG Pt takes total of 7MG   Dose:  2 mg       * predniSONE 5 MG Tabs   What changed:    - how much to take  - how to take this  - when to take this  - additional instructions   Last time this was given:  7 mg on 1/4/2017  1:07 PM   Commonly known as:  DELTASONE   Next Dose Due:  Itoday    Doctor's comments:  In addition to prednisone 1 mg prescription   1 to 4 tabs by mouth daily as directed       trazodone 50 MG Tabs   What changed:    - when to take this  - reasons to take this   Commonly known as:  DESYREL   Next Dose Due:  tonight    Take 1-2 Tabs by mouth every bedtime.   Dose:   mg       * Notice:  This list has 2 medication(s) that are the same as other medications prescribed for you. Read the directions carefully, and ask your doctor or other care provider to review them with you.      CONTINUE taking these medications        Instructions    ADCIRCA 20 MG Tabs   Last time this was given:  40 mg on 1/4/2017  9:00 PM   Generic drug:  Tadalafil (PAH)   Next Dose Due:  tonight    Take 40 mg by mouth every evening.   Dose:  40 mg       aspirin EC 81 MG Tbec   Commonly known as:  ECOTRIN   Next Dose Due:  In am    Take 81 mg by mouth every morning.   Dose:  81 mg       BARIATRIC FUSION Chew   Next Dose Due:  tonight    Take 1 Tab by mouth every evening.   Dose:  1 Tab       CELLCEPT 250 MG Caps   Last time this was given:  500 mg on 1/5/2017  8:36 AM   Generic drug:  mycophenolate   Next Dose Due:  tonight    Take 500 mg by mouth 2 times a day.   Dose:  500 mg       CITRACAL + D PO   Next Dose  Due:  today    Take 1 Tab by mouth 2 Times a Day. @@ 0700 and 1800   Dose:  1 Tab       cyclobenzaprine 10 MG Tabs   Last time this was given:  10 mg on 1/5/2017  8:35 AM   Commonly known as:  FLEXERIL   Next Dose Due:  As needed    TAKE 1 TABLET BY MOUTH 3 TIMES A DAY AS NEEDED FOR MUSCLE SPASMS       docusate sodium 100 MG Caps   Commonly known as:  COLACE   Next Dose Due:  tonight    Take 100 mg by mouth 2 times a day.   Dose:  100 mg       furosemide 40 MG Tabs   Commonly known as:  LASIX   Next Dose Due:  today    Take 20 mg by mouth every day.   Dose:  20 mg       gabapentin 600 MG tablet   Commonly known as:  NEURONTIN   Next Dose Due:  As needed    Doctor's comments:  Authorization of a refill request.   TAKE 1 TABLET BY MOUTH 3 TIMES A DAY       insulin glargine 100 UNIT/ML Soln   Commonly known as:  LANTUS   Next Dose Due:  In am    Inject 8 Units as instructed every morning.   Dose:  8 Units       LETAIRIS 10 MG tablet   Last time this was given:  10 mg on 1/5/2017  9:00 AM   Generic drug:  ambrisentan    Take 10 mg by mouth every day.   Dose:  10 mg       levothyroxine 137 MCG Tabs   Last time this was given:  137 mcg on 1/5/2017  8:35 AM   Commonly known as:  SYNTHROID   Next Dose Due:  tomorrow    Doctor's comments:  This is an authorization for a requested refill   TAKE 1 TABLET BY MOUTH EVERY DAY       omeprazole 40 MG delayed-release capsule   Last time this was given:  40 mg on 1/5/2017  8:36 AM   Commonly known as:  PRILOSEC   Next Dose Due:  In am    Take 1 Cap by mouth every day.   Dose:  40 mg       oxycodone immediate release 10 MG immediate release tablet   Commonly known as:  ROXICODONE   Next Dose Due:  As needed    Take 10 mg by mouth 3 times a day. @0700, 1200, and 2000   Dose:  10 mg       polyethylene glycol 3350 Powd   Commonly known as:  MIRALAX   Next Dose Due:  As needed    Take 17 g by mouth every day.   Dose:  17 g       pravastatin 20 MG Tabs   Commonly known as:  PRAVACHOL      Next Dose Due:  tonight    Take 1 Tab by mouth every day.   Dose:  20 mg       PROBIOTIC DAILY PO   Next Dose Due:  As needed    Take 1 Cap by mouth every evening.   Dose:  1 Cap       sennosides 8.6 MG Tabs   Commonly known as:  SENOKOT   Next Dose Due:  needed    Take 17.2 mg by mouth 2 times a day.   Dose:  17.2 mg       sertraline 100 MG Tabs   Last time this was given:  100 mg on 1/4/2017  9:03 PM   Commonly known as:  ZOLOFT   Next Dose Due:  tonight    Take 1 Tab by mouth every evening.   Dose:  100 mg       tacrolimus 1 MG Caps   Last time this was given:  2 mg on 1/5/2017  8:38 AM   Commonly known as:  PROGRAF   Next Dose Due:  tonight    Take 1.5-2 mg by mouth 2 times a day. Pt takes: 2MG in AM 1.5MG HS   Dose:  1.5-2 mg         STOP taking these medications     amoxicillin 500 MG Caps   Commonly known as:  AMOXIL       HYDROmorphone 8 MG tablet   Commonly known as:  DILAUDID               Instructions           Diet / Nutrition:    Follow any diet instructions given to you by your doctor or the dietician, including how much salt (sodium) you are allowed each day.    If you are overweight, talk to your doctor about a weight reduction plan.    Activity:    Remain physically active following your doctor's instructions about exercise and activity.    Rest often.     Any time you become even a little tired or short of breath, SIT DOWN and rest.    Worsening Symptoms:    Report any of the following signs and symptoms to the doctor's office immediately:    *Pain of jaw, arm, or neck  *Chest pain not relieved by medication                               *Dizziness or loss of consciousness  *Difficulty breathing even when at rest   *More tired than usual                                       *Bleeding drainage or swelling of surgical site  *Swelling of feet, ankles, legs or stomach                 *Fever (>100ºF)  *Pink or blood tinged sputum  *Weight gain (3lbs/day or 5lbs /week)           *Shock from internal  defibrillator (if applicable)  *Palpitations or irregular heartbeats                *Cool and/or numb extremities    Stroke Awareness    Common Risk Factors for Stroke include:    Age  Atrial Fibrillation  Carotid Artery Stenosis  Diabetes Mellitus  Excessive alcohol consumption  High blood pressure  Overweight   Physical inactivity  Smoking    Warning signs and symptoms of a stroke include:    *Sudden numbness or weakness of the face, arm or leg (especially on one side of the body).  *Sudden confusion, trouble speaking or understanding.  *Sudden trouble seeing in one or both eyes.  *Sudden trouble walking, dizziness, loss of balance or coordination.Sudden severe headache with no known cause.    It is very important to get treatment quickly when a stroke occurs. If you experience any of the above warning signs, call 911 immediately.                   Disclaimer         Quit Smoking / Tobacco Use:    I understand the use of any tobacco products increases my chance of suffering from future heart disease or stroke and could cause other illnesses which may shorten my life. Quitting the use of tobacco products is the single most important thing I can do to improve my health. For further information on smoking / tobacco cessation call a Toll Free Quit Line at 1-362.779.8875 (*National [employer] [place]) or 1-579.168.5578 (American Lung Association) or you can access the web based program at www.lungusa.org.    [place] Tobacco Users Help Line:  (271) 260-1267       Toll Free: 1-620.687.7454  Quit Tobacco Program [facility] (283)229-0494    Crisis Hotline:    [place] Crisis Hotline:  5-959-BUZHOHQ or 1-435.169.7954    [place] Crisis Hotline:    1-706.993.2221 or 632-736-1893    Discharge Survey:   Thank you for choosing [facility]. We hope we did everything we could to make your hospital stay a pleasant one. You may be receiving a phone survey and we would appreciate your time and participation in  answering the questions. Your input is very valuable to us in our efforts to improve our service to our patients and their families.        My signature on this form indicates that:    1. I have reviewed and understand the above information.  2. My questions regarding this information have been answered to my satisfaction.  3. I have formulated a plan with my discharge nurse to obtain my prescribed medications for home.                  Disclaimer         __________________________________                     __________       ________                       Patient Signature                                                 Date                    Time

## 2017-01-03 NOTE — ED PROVIDER NOTES
ED Provider Note    Scribed for Chiquita Nance D.O. by Cely Jang. 1/3/2017, 5:43 AM.    Primary care provider: Bernarda Reyes D.O.  Means of arrival: ambulance   History obtained from: patient   History limited by: none     CHIEF COMPLAINT  Chief Complaint   Patient presents with   • RUQ Pain     Pt reports symptoms started last night.   • Nausea/Vomiting/Diarrhea       HPI  Roxana Cuba is a 56 y.o. Female with history of liver transplant in December 2011 who presents to the Emergency Department via ambulance due to right upper quadrant abdominal pain onset last night at 12AM. The pain onset suddenly while she was trying to fall asleep. She describes the pain as cramping and stabbing. She has associated nausea, 2 episodes of vomiting, and diarrhea onset 2 days ago. She also reports shortness of breath, but states this is chronic. The patient states that she has chronic intermittent diarrhea and the last 2 days seem to be her baseline diarrhea, prior to that he was constipated for a bout 4 days. The patient is currently on anti-rejection medication, though denies recent medication change. She denies painful urination, hematuria, cough, congestion. The patient denies alcohol use, but reports history of pancreatitis and states that this pain feels similar. Patient has history of cholecystectomy and received her flu shot this year. Patient takes Dilaudid, 8mg BID, at home for chronic pain. She is on 4-6 liters oxygen at baseline.     REVIEW OF SYSTEMS  Review of Systems   Constitutional: Negative for fever.   HENT: Negative for congestion.    Respiratory: Positive for shortness of breath (chronic). Negative for cough.    Cardiovascular: Negative for chest pain.   Gastrointestinal: Positive for nausea, vomiting, abdominal pain, diarrhea and constipation. Negative for blood in stool.   Genitourinary: Negative for dysuria and hematuria.   Neurological: Negative for headaches.      As above, all other systems  negative.     PAST MEDICAL HISTORY   has a past medical history of Cirrhosis (HCC) (December 2011); S/P cholecystectomy; GERD (gastroesophageal reflux disease); Psychiatric disorder; Fracture of left foot; Bronchitis ( ); CKD (chronic kidney disease) stage 3, GFR 30-59 ml/min; Breath shortness; Chronic fatigue and malaise; VRE (vancomycin-resistant Enterococci); Low back pain ( ); Pneumonia; Mild aortic regurgitation and aortic valve sclerosis ( ); and Splenomegaly (7/14/2015).    SURGICAL HISTORY   has past surgical history that includes anesth,nose,sinus surgery; makayla by laparoscopy (9/19/2009); exam under anesthesia (11/11/2009); hysterectomy, total abdominal; other; gastroscopy-endo (3/12/2010); gastric banding laparoscopic; bronchoscopy-endo (5/29/2012); bronchoscopy-endo (6/19/2012); sigmoidoscopy flex (9/26/2012); recovery (2/27/2013); bronchoscopy-endo (11/15/2013); recovery (1/21/2014); recovery (3/24/2014); hemorrhoidectomy (11/11/2009); gastroscopy (9/28/2012); carpal tunnel release; bone marrow aspiration (12/31/2012); bone marrow biopsy, ndl/trocar (12/31/2012); recovery (3/31/2014); other abdominal surgery (December 2011); bone marrow aspiration (3/16/2015); bone marrow biopsy, ndl/trocar (3/16/2015); lung biopsy open; tonsillectomy; and cholecystectomy.    SOCIAL HISTORY  Social History   Substance Use Topics   • Smoking status: Passive Smoke Exposure - Never Smoker   • Smokeless tobacco: Never Used      Comment: avoid all tobacco products   • Alcohol Use: No      Comment: 05/2009 quit drinking      History   Drug Use No       FAMILY HISTORY  Family History   Problem Relation Age of Onset   • Other Father      Unknown (dead 10 years)   • Diabetes Father    • Heart Disease Father    • Hypertension Father    • Hyperlipidemia Father    • Stroke Father    • Non-contributory Mother    • Hyperlipidemia Mother    • Alcohol/Drug Mother    • Cancer Paternal Aunt    • Alcohol/Drug Maternal Grandmother    •  Alcohol/Drug Maternal Grandfather        CURRENT MEDICATIONS  No current facility-administered medications on file prior to encounter.     Current Outpatient Prescriptions on File Prior to Encounter   Medication Sig Dispense Refill   • gabapentin (NEURONTIN) 600 MG tablet TAKE 1 TABLET BY MOUTH 3 TIMES A  Tab 0   • predniSONE (DELTASONE) 5 MG Tab 1 to 4 tabs by mouth daily as directed (Patient taking differently: Take 5 mg by mouth every day. Pt has another RX for 2MG  Pt takes total of 7MG) 120 Tab 5   • pravastatin (PRAVACHOL) 20 MG Tab Take 1 Tab by mouth every day. 30 Tab 11   • HYDROmorphone (DILAUDID) 8 MG tablet Take 1 Tab by mouth every 8 hours as needed for Severe Pain. 90 Tab 0   • omeprazole (PRILOSEC) 40 MG delayed-release capsule Take 1 Cap by mouth every day. 90 Cap 4   • sertraline (ZOLOFT) 100 MG Tab Take 1 Tab by mouth every evening. 90 Tab 4   • levothyroxine (SYNTHROID) 137 MCG Tab TAKE 1 TABLET BY MOUTH EVERY DAY 90 Tab 2   • cyclobenzaprine (FLEXERIL) 10 MG Tab TAKE 1 TABLET BY MOUTH 3 TIMES A DAY AS NEEDED FOR MUSCLE SPASMS 270 Tab 4   • Calcium Citrate-Vitamin D (CITRACAL + D PO) Take 1 Tab by mouth 2 Times a Day. @@ 0700 and 1800     • tacrolimus (PROGRAF) 1 MG Cap Take 1.5-2 mg by mouth 2 times a day. Pt takes:  2MG in AM  1.5MG HS     • sennosides (SENOKOT) 8.6 MG Tab Take 17.2 mg by mouth 2 times a day.     • trazodone (DESYREL) 50 MG Tab Take 1-2 Tabs by mouth every bedtime. (Patient taking differently: Take  mg by mouth at bedtime as needed for Sleep.) 180 Tab 4   • oxycodone immediate release (ROXICODONE) 10 MG immediate release tablet Take 10 mg by mouth 3 times a day. @0700, 1200, and 2000     • aspirin EC (ECOTRIN) 81 MG Tablet Delayed Response Take 81 mg by mouth every morning.     • Probiotic Product (PROBIOTIC DAILY PO) Take 1 Cap by mouth every evening.     • ondansetron (ZOFRAN ODT) 4 MG TABLET DISPERSIBLE Take 1 Tab by mouth every 8 hours as needed. Nausea and  vomiting (Patient taking differently: Take 4 mg by mouth every 8 hours as needed for Nausea/Vomiting. Nausea and vomiting) 270 Tab 4        ALLERGIES  Allergies   Allergen Reactions   • Rhubarb Anaphylaxis   • Vancomycin Hcl      Causes red man syndrome when infused to fast         PHYSICAL EXAM  VITAL SIGNS: /56 mmHg  Pulse 74  Temp(Src) 37.4 °C (99.3 °F)  Resp 12  Wt 80.74 kg (178 lb)  SpO2 94%     Vitals reviewed.  Constitutional: Patient is oriented to person, place, and time. Appears well-developed and well-nourished. Anxious, tearful.   Head: Normocephalic and atraumatic.   Ears: Normal external ears bilaterally.   Mouth/Throat: Oropharynx is clear, dry mucous membranes  Eyes: Conjunctivae are normal. Pupils are equal, round, and reactive to light.   Neck: Normal range of motion. Neck supple.  Cardiovascular: Normal rate, regular rhythm and normal heart sounds. Normal peripheral pulses.  Pulmonary/Chest: Effort normal and breath sounds normal. No respiratory distress, no wheezes, rhonchi, or rales. No chest wall tenderness.  Abdominal: Soft. Decreased Bowel sounds. There is no rebound or guarding, or peritoneal signs. No CVA tenderness. Epigastric, mid upper, and left upper quadrant tenderness.   Musculoskeletal: No edema and no tenderness.   Lymphadenopathy: No cervical adenopathy.   Neurological: No focal deficits.   Skin: Skin is warm and dry. No erythema. No pallor.   Psychiatric: anxious affect. Tearful.     LABS  Results for orders placed or performed during the hospital encounter of 01/03/17   CBC WITH DIFFERENTIAL   Result Value Ref Range    WBC 5.0 4.8 - 10.8 K/uL    RBC 4.59 4.20 - 5.40 M/uL    Hemoglobin 12.9 12.0 - 16.0 g/dL    Hematocrit 38.7 37.0 - 47.0 %    MCV 84.3 81.4 - 97.8 fL    MCH 28.1 27.0 - 33.0 pg    MCHC 33.3 (L) 33.6 - 35.0 g/dL    RDW 44.8 35.9 - 50.0 fL    Platelet Count 82 (L) 164 - 446 K/uL    MPV 8.9 (L) 9.0 - 12.9 fL    Neutrophils-Polys 69.70 44.00 - 72.00 %     Lymphocytes 20.30 (L) 22.00 - 41.00 %    Monocytes 7.00 0.00 - 13.40 %    Eosinophils 2.20 0.00 - 6.90 %    Basophils 0.40 0.00 - 1.80 %    Immature Granulocytes 0.40 0.00 - 0.90 %    Nucleated RBC 0.00 /100 WBC    Neutrophils (Absolute) 3.46 2.00 - 7.15 K/uL    Lymphs (Absolute) 1.01 1.00 - 4.80 K/uL    Monos (Absolute) 0.35 0.00 - 0.85 K/uL    Eos (Absolute) 0.11 0.00 - 0.51 K/uL    Baso (Absolute) 0.02 0.00 - 0.12 K/uL    Immature Granulocytes (abs) 0.02 0.00 - 0.11 K/uL    NRBC (Absolute) 0.00 K/uL   COMP METABOLIC PANEL   Result Value Ref Range    Sodium 137 135 - 145 mmol/L    Potassium 3.5 (L) 3.6 - 5.5 mmol/L    Chloride 99 96 - 112 mmol/L    Co2 30 20 - 33 mmol/L    Anion Gap 8.0 0.0 - 11.9    Glucose 135 (H) 65 - 99 mg/dL    Bun 18 8 - 22 mg/dL    Creatinine 1.21 0.50 - 1.40 mg/dL    Calcium 9.9 8.5 - 10.5 mg/dL    AST(SGOT) 19 12 - 45 U/L    ALT(SGPT) 17 2 - 50 U/L    Alkaline Phosphatase 27 (L) 30 - 99 U/L    Total Bilirubin 0.4 0.1 - 1.5 mg/dL    Albumin 4.3 3.2 - 4.9 g/dL    Total Protein 6.3 6.0 - 8.2 g/dL    Globulin 2.0 1.9 - 3.5 g/dL    A-G Ratio 2.2 g/dL   LIPASE   Result Value Ref Range    Lipase 10 (L) 11 - 82 U/L   ESTIMATED GFR   Result Value Ref Range    GFR If  56 (A) >60 mL/min/1.73 m 2    GFR If Non  46 (A) >60 mL/min/1.73 m 2     *Note: Due to a large number of results and/or encounters for the requested time period, some results have not been displayed. A complete set of results can be found in Results Review.      All labs reviewed by me.    RADIOLOGY  CT-ABDOMEN-PELVIS WITH   Final Result      1.  Small bowel dilatation with evidence of bowel stasis in the proximal jejunum. Transition point in the left upper quadrant suggestive of at least a partial small bowel obstruction.      2.  Postoperative changes from prior liver transplant.      3.  Splenomegaly with hypodense splenic lesion, unchanged.        The radiologist's interpretation of all radiological  "studies have been reviewed by me.    COURSE & MEDICAL DECISION MAKING  Pertinent Labs & Imaging studies reviewed. (See chart for details)    Obtained and reviewed past medical records which show patient has history of cholecystectomy, and has history of liver transplant by Dr Canada in December 2011     6:14 AM - Patient seen and examined at bedside. Patient will be treated with 1mg Dilaudid, IV fluids, 4mg Zofran. Ordered CT abdomen pelvis, CBC with differential, lipase, and other labs to evaluate her symptoms. The differential diagnoses include but are not limited to: SBO, pancreatitis, AGE, colitis  I told the patient we will obtain diagnostic studies to evaluate, and treat her pain.   Patient is anxious and tearful, oxygen saturation of 94% on nasal canula oxygen.    6:50 AM I Reevaluated patient at bedside who states her pain is not improved with 1 mg Dilaudid, and now informs me she takes Dilaudid for chronic pain at home. I discussed my differentials with her as above. She will be treated with additional 1 mg Dilaudid.      8:09 AM Imaging reveals bowel obstruction as above.     8:13 AM I reevaluated patient at bedside and informed her of her CT results as above. She will be admitted today. She states she has seen Dr Robles, surgery, in the past. Patient is requesting to be transferred to [facility], stating she is \"so complex\". Patient states her pain is still present and is requesting Morphine rather than Dilaudid. I will treat as such.     8:18 AM Paged general surgery.    8:20 AM Paged Dr Robles.    8:21AM Patient will be treated with 10mg Morphine    8:42 AM - I discussed the patient's case and the above findings with Dr. Castillo (surgery) who states that he does prefer she be transferred to [facility].      8:43 AM I consulted with patient care coordinator at this time.     8:49 AM I informed the patient of the plan to try to transfer her to [facility]. The patient states that she saw Dr Jimenez, surgery, previously " at [facility].     8:58 AM I spoke with Roslyn, care coordinator, regarding patient's transfer. She states that the patient may need to be admitted to the hospitalist due to insurance  issues.     9:54 AM I discussed the case with Roslyn once again, the patient needs to be transferred due to being immunocompromised and given her history. She states the inclement weather is an issue at this time, and the transfer may take a few days to be accepted. I will speak with Dr Castillo once again regarding patient's case regarding surgical consult for her as an inpatient.     9:56 AM Paged Dr Castillo    10:06 AM - I discussed the patient's case and the above findings with Dr. Castillo (general surgery) who will consult.      10:26 AM Reevaluated patient who reports improvement of her pain status post NG tube placement.     12:31 PM Patient now requesting to stay here in this hospitalist. I have updated Dr. Varner and discussed the patients work-up with the hospitalist.      CRITICAL CARE  The very real possibility of a deterioration of this patient's condition required the highest level of my preparedness for sudden, emergent intervention.  I provided critical care services, which included medication orders, frequent reevaluations of the patient's condition and response to treatment, ordering and reviewing test results, and discussing the case with various consultants.  The critical care time associated with the care of the patient was 55 minutes. Review chart for interventions. This time is exclusive of any other billable procedures.      DISPOSITION:  admit    FINAL IMPRESSION  Critical care time 55 minutes, see above.   1. SBO (small bowel obstruction) (HCC)    2. S/P liver transplant (HCC)         Cely BRODY), am scribing for, and in the presence of, Chiquita Nance D.O..    Electronically signed by: Cely Cr), 1/3/2017    Chiquita BRODY D.O. personally performed the services  described in this documentation, as scribed by Cely Jang in my presence, and it is both accurate and complete.    The note accurately reflects work and decisions made by me.  Chiquita Nance  1/3/2017  12:34 PM

## 2017-01-03 NOTE — IP AVS SNAPSHOT
1/5/2017          Roxana Cuba  [address]  [place] [address]    Dear Roxana:    [facility] wants to ensure your discharge home is safe and you or your loved ones have had all your questions answered regarding your care after you leave the hospital.    You may receive a telephone call within two days of your discharge.  This call is to make certain you understand your discharge instructions as well as ensure we provided you with the best care possible during your stay with us.     The call will only last approximately 3-5 minutes and will be done by a nurse.    Once again, we want to ensure your discharge home is safe and that you have a clear understanding of any next steps in your care.  If you have any questions or concerns, please do not hesitate to contact us, we are here for you.  Thank you for choosing [facility] for your healthcare needs.    Sincerely,    Fredrick Figueroa    [facility]

## 2017-01-03 NOTE — ED NOTES
Pt on call light, upset with wait for MD reporting increased pain and that staff was not taking care of pt. Pt informed that her IV has been established and blood sent and that she is awaiting ERP for medication and orders. Apologized for wait and informed of ER process. Pt crying and speaking with  on phone.

## 2017-01-04 ENCOUNTER — TELEPHONE (OUTPATIENT)
Dept: CARDIOLOGY | Facility: MEDICAL CENTER | Age: 57
End: 2017-01-04

## 2017-01-04 PROBLEM — K21.9 GERD (GASTROESOPHAGEAL REFLUX DISEASE): Status: ACTIVE | Noted: 2017-01-04

## 2017-01-04 LAB
ANION GAP SERPL CALC-SCNC: 8 MMOL/L (ref 0–11.9)
BASOPHILS # BLD AUTO: 0.7 % (ref 0–1.8)
BASOPHILS # BLD: 0.03 K/UL (ref 0–0.12)
BUN SERPL-MCNC: 12 MG/DL (ref 8–22)
CALCIUM SERPL-MCNC: 9.5 MG/DL (ref 8.5–10.5)
CHLORIDE SERPL-SCNC: 107 MMOL/L (ref 96–112)
CO2 SERPL-SCNC: 25 MMOL/L (ref 20–33)
CREAT SERPL-MCNC: 0.91 MG/DL (ref 0.5–1.4)
EOSINOPHIL # BLD AUTO: 0.02 K/UL (ref 0–0.51)
EOSINOPHIL NFR BLD: 0.5 % (ref 0–6.9)
ERYTHROCYTE [DISTWIDTH] IN BLOOD BY AUTOMATED COUNT: 45.5 FL (ref 35.9–50)
GFR SERPL CREATININE-BSD FRML MDRD: >60 ML/MIN/1.73 M 2
GLUCOSE BLD-MCNC: 86 MG/DL (ref 65–99)
GLUCOSE BLD-MCNC: 95 MG/DL (ref 65–99)
GLUCOSE SERPL-MCNC: 102 MG/DL (ref 65–99)
HCT VFR BLD AUTO: 38.6 % (ref 37–47)
HGB BLD-MCNC: 12.8 G/DL (ref 12–16)
IMM GRANULOCYTES # BLD AUTO: 0.01 K/UL (ref 0–0.11)
IMM GRANULOCYTES NFR BLD AUTO: 0.2 % (ref 0–0.9)
INR PPP: 0.98 (ref 0.87–1.13)
LYMPHOCYTES # BLD AUTO: 0.59 K/UL (ref 1–4.8)
LYMPHOCYTES NFR BLD: 13.9 % (ref 22–41)
MCH RBC QN AUTO: 28.4 PG (ref 27–33)
MCHC RBC AUTO-ENTMCNC: 33.2 G/DL (ref 33.6–35)
MCV RBC AUTO: 85.8 FL (ref 81.4–97.8)
MONOCYTES # BLD AUTO: 0.17 K/UL (ref 0–0.85)
MONOCYTES NFR BLD AUTO: 4 % (ref 0–13.4)
NEUTROPHILS # BLD AUTO: 3.43 K/UL (ref 2–7.15)
NEUTROPHILS NFR BLD: 80.7 % (ref 44–72)
NRBC # BLD AUTO: 0 K/UL
NRBC BLD AUTO-RTO: 0 /100 WBC
PLATELET # BLD AUTO: 74 K/UL (ref 164–446)
PMV BLD AUTO: 9 FL (ref 9–12.9)
POTASSIUM SERPL-SCNC: 4.5 MMOL/L (ref 3.6–5.5)
PROTHROMBIN TIME: 13.3 SEC (ref 12–14.6)
RBC # BLD AUTO: 4.5 M/UL (ref 4.2–5.4)
SODIUM SERPL-SCNC: 140 MMOL/L (ref 135–145)
WBC # BLD AUTO: 4.3 K/UL (ref 4.8–10.8)

## 2017-01-04 PROCEDURE — 90471 IMMUNIZATION ADMIN: CPT

## 2017-01-04 PROCEDURE — 3E0234Z INTRODUCTION OF SERUM, TOXOID AND VACCINE INTO MUSCLE, PERCUTANEOUS APPROACH: ICD-10-PCS | Performed by: HOSPITALIST

## 2017-01-04 PROCEDURE — A9270 NON-COVERED ITEM OR SERVICE: HCPCS | Performed by: SURGERY

## 2017-01-04 PROCEDURE — 99233 SBSQ HOSP IP/OBS HIGH 50: CPT | Performed by: HOSPITALIST

## 2017-01-04 PROCEDURE — 90732 PPSV23 VACC 2 YRS+ SUBQ/IM: CPT | Performed by: HOSPITALIST

## 2017-01-04 PROCEDURE — 85610 PROTHROMBIN TIME: CPT

## 2017-01-04 PROCEDURE — 82962 GLUCOSE BLOOD TEST: CPT

## 2017-01-04 PROCEDURE — 770001 HCHG ROOM/CARE - MED/SURG/GYN PRIV*

## 2017-01-04 PROCEDURE — 80048 BASIC METABOLIC PNL TOTAL CA: CPT

## 2017-01-04 PROCEDURE — A9270 NON-COVERED ITEM OR SERVICE: HCPCS | Performed by: HOSPITALIST

## 2017-01-04 PROCEDURE — 700101 HCHG RX REV CODE 250: Performed by: HOSPITALIST

## 2017-01-04 PROCEDURE — 700111 HCHG RX REV CODE 636 W/ 250 OVERRIDE (IP): Performed by: HOSPITALIST

## 2017-01-04 PROCEDURE — 85025 COMPLETE CBC W/AUTO DIFF WBC: CPT

## 2017-01-04 PROCEDURE — 700102 HCHG RX REV CODE 250 W/ 637 OVERRIDE(OP): Performed by: SURGERY

## 2017-01-04 PROCEDURE — 36415 COLL VENOUS BLD VENIPUNCTURE: CPT

## 2017-01-04 PROCEDURE — 700102 HCHG RX REV CODE 250 W/ 637 OVERRIDE(OP): Performed by: HOSPITALIST

## 2017-01-04 RX ORDER — INSULIN GLARGINE 100 [IU]/ML
8 INJECTION, SOLUTION SUBCUTANEOUS NIGHTLY
Status: DISCONTINUED | OUTPATIENT
Start: 2017-01-04 | End: 2017-01-04

## 2017-01-04 RX ORDER — OMEPRAZOLE 20 MG/1
40 CAPSULE, DELAYED RELEASE ORAL DAILY
Status: DISCONTINUED | OUTPATIENT
Start: 2017-01-04 | End: 2017-01-05 | Stop reason: HOSPADM

## 2017-01-04 RX ORDER — TACROLIMUS 1 MG/1
1.5-2 CAPSULE ORAL 2 TIMES DAILY
Status: DISCONTINUED | OUTPATIENT
Start: 2017-01-04 | End: 2017-01-04

## 2017-01-04 RX ORDER — LEVOTHYROXINE SODIUM 137 UG/1
137 TABLET ORAL
Status: DISCONTINUED | OUTPATIENT
Start: 2017-01-04 | End: 2017-01-05 | Stop reason: HOSPADM

## 2017-01-04 RX ORDER — MYCOPHENOLATE MOFETIL 250 MG/1
500 CAPSULE ORAL 2 TIMES DAILY
Status: DISCONTINUED | OUTPATIENT
Start: 2017-01-04 | End: 2017-01-05 | Stop reason: HOSPADM

## 2017-01-04 RX ORDER — OMEPRAZOLE 20 MG/1
40 CAPSULE, DELAYED RELEASE ORAL DAILY
Status: DISCONTINUED | OUTPATIENT
Start: 2017-01-04 | End: 2017-01-04

## 2017-01-04 RX ORDER — BISACODYL 10 MG
10 SUPPOSITORY, RECTAL RECTAL DAILY
Status: DISCONTINUED | OUTPATIENT
Start: 2017-01-04 | End: 2017-01-05 | Stop reason: HOSPADM

## 2017-01-04 RX ORDER — INSULIN GLARGINE 100 [IU]/ML
8 INJECTION, SOLUTION SUBCUTANEOUS EVERY MORNING
Status: ON HOLD | COMMUNITY
End: 2017-01-16

## 2017-01-04 RX ORDER — DEXTROSE MONOHYDRATE 25 G/50ML
25 INJECTION, SOLUTION INTRAVENOUS
Status: DISCONTINUED | OUTPATIENT
Start: 2017-01-04 | End: 2017-01-05 | Stop reason: HOSPADM

## 2017-01-04 RX ORDER — MORPHINE SULFATE 4 MG/ML
1-2 INJECTION, SOLUTION INTRAMUSCULAR; INTRAVENOUS
Status: DISCONTINUED | OUTPATIENT
Start: 2017-01-04 | End: 2017-01-05 | Stop reason: HOSPADM

## 2017-01-04 RX ORDER — INSULIN GLARGINE 100 [IU]/ML
8 INJECTION, SOLUTION SUBCUTANEOUS NIGHTLY
Status: DISCONTINUED | OUTPATIENT
Start: 2017-01-04 | End: 2017-01-05 | Stop reason: HOSPADM

## 2017-01-04 RX ORDER — TACROLIMUS 1 MG/1
2 CAPSULE ORAL EVERY MORNING
Status: DISCONTINUED | OUTPATIENT
Start: 2017-01-04 | End: 2017-01-05 | Stop reason: HOSPADM

## 2017-01-04 RX ORDER — AMBRISENTAN 10 MG/1
10 TABLET, FILM COATED ORAL DAILY
Status: DISCONTINUED | OUTPATIENT
Start: 2017-01-04 | End: 2017-01-05 | Stop reason: HOSPADM

## 2017-01-04 RX ORDER — SERTRALINE HYDROCHLORIDE 100 MG/1
100 TABLET, FILM COATED ORAL EVERY EVENING
Status: DISCONTINUED | OUTPATIENT
Start: 2017-01-04 | End: 2017-01-05 | Stop reason: HOSPADM

## 2017-01-04 RX ORDER — CYCLOBENZAPRINE HCL 10 MG
10 TABLET ORAL 3 TIMES DAILY
Status: DISCONTINUED | OUTPATIENT
Start: 2017-01-04 | End: 2017-01-05 | Stop reason: HOSPADM

## 2017-01-04 RX ORDER — TADALAFIL 20 MG/1
40 TABLET ORAL EVERY EVENING
Status: DISCONTINUED | OUTPATIENT
Start: 2017-01-04 | End: 2017-01-05 | Stop reason: HOSPADM

## 2017-01-04 RX ADMIN — SERTRALINE 100 MG: 100 TABLET, FILM COATED ORAL at 21:03

## 2017-01-04 RX ADMIN — TACROLIMUS 1.5 MG: 1 CAPSULE ORAL at 21:06

## 2017-01-04 RX ADMIN — PNEUMOCOCCAL VACCINE POLYVALENT 25 MCG
25; 25; 25; 25; 25; 25; 25; 25; 25; 25; 25; 25; 25; 25; 25; 25; 25; 25; 25; 25; 25; 25; 25 INJECTION, SOLUTION INTRAMUSCULAR; SUBCUTANEOUS at 06:09

## 2017-01-04 RX ADMIN — CYCLOBENZAPRINE HYDROCHLORIDE 10 MG: 10 TABLET, FILM COATED ORAL at 21:01

## 2017-01-04 RX ADMIN — MYCOPHENOLATE MOFETIL 500 MG: 250 CAPSULE ORAL at 13:08

## 2017-01-04 RX ADMIN — MYCOPHENOLATE MOFETIL 500 MG: 250 CAPSULE ORAL at 21:06

## 2017-01-04 RX ADMIN — TADALAFIL 40 MG: 20 TABLET ORAL at 21:00

## 2017-01-04 RX ADMIN — PREDNISONE 7 MG: 1 TABLET ORAL at 13:07

## 2017-01-04 RX ADMIN — LORAZEPAM 1 MG: 2 INJECTION INTRAMUSCULAR; INTRAVENOUS at 18:01

## 2017-01-04 RX ADMIN — ONDANSETRON 4 MG: 2 INJECTION, SOLUTION INTRAMUSCULAR; INTRAVENOUS at 18:05

## 2017-01-04 RX ADMIN — CYCLOBENZAPRINE HYDROCHLORIDE 10 MG: 10 TABLET, FILM COATED ORAL at 13:08

## 2017-01-04 RX ADMIN — LORAZEPAM 1 MG: 2 INJECTION INTRAMUSCULAR; INTRAVENOUS at 06:15

## 2017-01-04 RX ADMIN — AMBRISENTAN 10 MG: 10 TABLET, FILM COATED ORAL at 13:12

## 2017-01-04 RX ADMIN — MORPHINE SULFATE 2 MG: 4 INJECTION INTRAVENOUS at 18:00

## 2017-01-04 RX ADMIN — ENOXAPARIN SODIUM 40 MG: 100 INJECTION SUBCUTANEOUS at 08:06

## 2017-01-04 RX ADMIN — HYDROCORTISONE SODIUM SUCCINATE 50 MG: 100 INJECTION, POWDER, FOR SOLUTION INTRAMUSCULAR; INTRAVENOUS at 08:06

## 2017-01-04 RX ADMIN — POTASSIUM CHLORIDE AND SODIUM CHLORIDE: 900; 150 INJECTION, SOLUTION INTRAVENOUS at 06:15

## 2017-01-04 RX ADMIN — OMEPRAZOLE 40 MG: 20 CAPSULE, DELAYED RELEASE ORAL at 21:03

## 2017-01-04 RX ADMIN — MORPHINE SULFATE 2 MG: 4 INJECTION INTRAVENOUS at 14:07

## 2017-01-04 RX ADMIN — LORAZEPAM 1 MG: 2 INJECTION INTRAMUSCULAR; INTRAVENOUS at 14:12

## 2017-01-04 RX ADMIN — POTASSIUM CHLORIDE AND SODIUM CHLORIDE: 900; 150 INJECTION, SOLUTION INTRAVENOUS at 12:18

## 2017-01-04 RX ADMIN — POTASSIUM CHLORIDE AND SODIUM CHLORIDE: 900; 150 INJECTION, SOLUTION INTRAVENOUS at 22:28

## 2017-01-04 RX ADMIN — LEVOTHYROXINE SODIUM 137 MCG: 137 TABLET ORAL at 13:08

## 2017-01-04 RX ADMIN — TACROLIMUS 2 MG: 1 CAPSULE ORAL at 13:08

## 2017-01-04 RX ADMIN — LORAZEPAM 1 MG: 2 INJECTION INTRAMUSCULAR; INTRAVENOUS at 22:21

## 2017-01-04 RX ADMIN — MORPHINE SULFATE 2 MG: 4 INJECTION INTRAVENOUS at 06:06

## 2017-01-04 RX ADMIN — BISACODYL 10 MG: 10 SUPPOSITORY RECTAL at 09:56

## 2017-01-04 RX ADMIN — MORPHINE SULFATE 2 MG: 4 INJECTION INTRAVENOUS at 21:04

## 2017-01-04 RX ADMIN — MORPHINE SULFATE 2 MG: 4 INJECTION INTRAVENOUS at 09:56

## 2017-01-04 ASSESSMENT — ENCOUNTER SYMPTOMS
CHILLS: 0
WHEEZING: 0
DIARRHEA: 0
ABDOMINAL PAIN: 0
BLOOD IN STOOL: 0
ABDOMINAL PAIN: 1
COUGH: 0
NERVOUS/ANXIOUS: 1
CONSTIPATION: 1
CONSTIPATION: 0
VOMITING: 0
FEVER: 0
RESPIRATORY NEGATIVE: 1
SHORTNESS OF BREATH: 0
CARDIOVASCULAR NEGATIVE: 1
NAUSEA: 0
HEADACHES: 0

## 2017-01-04 ASSESSMENT — PAIN SCALES - GENERAL
PAINLEVEL_OUTOF10: 9
PAINLEVEL_OUTOF10: 8
PAINLEVEL_OUTOF10: 10
PAINLEVEL_OUTOF10: 5
PAINLEVEL_OUTOF10: 5
PAINLEVEL_OUTOF10: 8

## 2017-01-04 NOTE — CARE PLAN
Problem: Safety  Goal: Will remain free from injury  Updated about POC. Reinforce call light use. Pt acknowledged understanding.     Problem: Bowel/Gastric:  Goal: Normal bowel function is maintained or improved  Denies n/v.  NG to LIS.     Problem: Pain Management  Goal: Pain level will decrease to patient’s comfort goal  Pain well controlled with morphine 2mg q4 prn

## 2017-01-04 NOTE — PROGRESS NOTES
1045am - Spoke with pt regarding finding her a hospitalist since she refuses Dr. Álvarez. She told me that she was the  for this hospital and 4 other hospitals and she knows how this goes. I educated her that it takes a little bit a time to coordinate to find her a new physician, and I reassured her that it would not be much longer. I assured her that we will give her great care. She expressed her frustrations with not having her home medications ordered yet. She was educated previously by Dr. Castillo and Lindsay Narayan, Charge RN, and now this RN that until a physician is able to assess her, the medications cannot be ordered and since you refuse for Dr. Álvarez to assess you it cannot happen at this time.     1055 - spoke with Dr. Álvarez and he reported that Dr. Odell will be taking over this patient's care. Paged Dr. Odell and he returned my call immediately. He assured me that he will be in to see the patient wihtin the next 10 minutes.     1105 - informed pt that Dr. Odell will be down within the next 10 minutes to see her.    1110 - Dr. Odell in the Room with Pt and .

## 2017-01-04 NOTE — TELEPHONE ENCOUNTER
Pt called  from her hospital room [facility] Room 412-2 reporting that they are not giving her, medications as directed.  She also does not want a hosptialist for her doctor.  She wanted Dr. Phan to be aware she is admitted   FYI to Dr. Phan

## 2017-01-04 NOTE — RESPIRATORY CARE
COPD EDUCATION by COPD CLINICAL EDUCATOR  1/4/2017 at 7:03 AM by Maci Velásquez     Patient reviewed by COPD education team. Patient does not qualify for COPD program.

## 2017-01-04 NOTE — CARE PLAN
Problem: Pain Management  Goal: Pain level will decrease to patient’s comfort goal  IV Morphine on board; controlling pain.     Problem: Psychosocial Needs:  Goal: Level of anxiety will decrease  Ativan on board, allow patient to vent frustrations, escalate problems, follow through

## 2017-01-04 NOTE — H&P
PRIMARY CARE PHYSICIAN:  Bernarda Damon DO    GASTROENTEROLOGIST:  Dr. Sudarshan Canada.    CHIEF COMPLAINT:  Abdominal pain with distention, nausea, vomiting.    HISTORY OF PRESENT ILLNESS:  Patient is a 56-year-old female with history of   liver transplant, on chronic immunosuppressants, chronic bronchiolitis,   chronic respiratory failure, O2 dependent 4-6 liters nasal cannula, pulmonary   hypertension and sarcoidosis.  She reports cause for her cirrhosis, alcohol   abuse per records, diabetes mellitus, chronic pain, evaluated here at [facility]   with complaints of multiple episodes of nausea, vomiting, abdominal pain with   distention.  Onset of symptoms around 11:00 p.m. on Monday night.  Reports her   emesis has been brown to greenish in color.  She did have passage of gas once   yesterday.  No reported melena or hematochezia.  Has had generalized malaise,   fatigue.  Approximately 100 pound weight loss since her gastric sleeve   surgery in 12/2015.    Patient in the ER, findings of small bowel obstruction.  Patient has been   evaluated by Dr. Castillo, surgery, plan initially was transferred to [facility]   where she had her prior surgeries for further evaluation.  At this point, she   has preference to be admitted to [facility], planned for NG tube decompression per   surgery recommendations, IV fluids, pain control with antiemetics.    REVIEW OF SYSTEMS:  As above, otherwise negative according to AMA and CMS   criteria.    PAST MEDICAL HISTORY:  Includes:  1.  Cirrhosis, attributed to sarcoid disease, may have had alcoholic liver   disease post-liver transplant in 12/2011.  2.  Hypertension and diabetes.  She reports resolved.  3.  Chronic bronchiolitis with history of lung biopsy in 2016, chronic   respiratory failure, O2 dependent 4-6 liters nasal cannula.  4.  Splenomegaly and cardiomegaly.  5.  Aortic valve stenosis followed by Dr. Nirmal Li, history of cardiac   catheterization, unclear results.  6.   Chronic fatigue, nausea, chronic pain, hip, spine and also headaches.  7.  Obesity.  8.  Left foot fracture.  9.  Shingles.  10.  Hepatorenal syndrome.  11.  Chronic immunosuppressant therapy and steroid use post-liver transplant.  12.  GERD.  13.  Hypothyroidism.    PAST SURGICAL HISTORY:  Gastric sleeve surgery in 12/2015, liver transplant in   12/2011.  She has had cholecystectomy, hernia repair, hemorrhoid surgery,   hysterectomy.    HOME MEDICATIONS:  Include Letairis 10 mg daily, aspirin 81 mg daily,   calcitriol plus vitamin D one tablet twice a day, Flexeril 10 mg 3 times a day   as needed for spasms, Colace 100 mg twice a day, Lasix 20 mg daily, Neurontin   600 mg t.i.d., Dilaudid 8 mg every 8 hours as needed for severe pain,   Synthroid 137 mcg daily, CellCept 500 mg twice a day, multivitamin daily,   Prilosec 40 daily, Zofran 4 q.8 hours p.r.n., oxycodone 10 mg every 8 hours as   needed, Pravachol 20 daily, prednisone unclear dosage 2-5 mg daily, probiotic   daily, Senokot 8.6 mg daily, Zoloft 100 mg daily, Prograf takes 2 mg in the   morning and 1.5 evening, tadalafil 40 mg daily, trazodone  mg at   bedtime.    ALLERGIES:  INCLUDE VANCOMYCIN CAUSED RED MAN SYNDROME AND RHUBARB CAUSED   ANAPHYLAXIS PER RECORDS.    SOCIAL HISTORY:  Quit alcohol use in 2009.  No tobacco.  Lives with her   .    FAMILY HISTORY:  Father had diabetes.    PHYSICAL EXAMINATION:  CURRENT VITAL SIGNS:  Temperature of 37.4, pulse 70s-60s, blood pressure   121/56, 99% on 6 liters, 100% on 4 liters, 80 kilograms.  GENERAL:  Patient was disheveled appearing, very anxious, alert, appropriately   oriented, mildly obese.  HEENT:  Anicteric.  Extraocular movements are intact.  Mucous membranes were   dry.  She had a NG tube in place with brownish gastric output.  NECK:  No cervical or supraclavicular adenopathy.  Trachea midline.  CARDIOVASCULAR:  Regular rate and rhythm.  No murmurs.  LUNGS:  Clear to auscultation  bilaterally.  CHEST:  Normal chest wall excursion effort without chest wall tenderness.  ABDOMEN:  Bowel sounds were present with distension.  Tenderness to palpation,   moderate generalized with mild guarding, no rebound.  Well-healed right upper   quadrant scar and laparoscopic scars.  BACK:  No CVA or paraspinal tenderness.  EXTREMITIES:  There is no lower extremity edema, negative Homans sign,   symmetric, generalized 5/5 strength.  NEUROLOGIC:  Cranial nerves grossly intact.  No focal extremity weakness.  SKIN:  Warm, dry without pallor.  PSYCHIATRIC:  She is anxious, restless.    LABORATORY DATA:  The patient's white count 5, hemoglobin 12, platelet count   82,000.  BUN and creatinine of 18 and 1.21, blood sugar 135, potassium 3.5,   alk phos of 27, lipase of 10.    A CT of the abdomen and pelvis revealed small bowel dilation with evidence of   bowel stasis proximal jejunum transition point left upper quadrant suggestive   of at least partial small bowel obstruction, postoperative changes of liver   transplant, splenomegaly with unchanged hypodense splenic lesion.    IMPRESSION AND PLAN:  1.  Small bowel obstruction.  Patient will be admitted acute to surgical   floor.  We will continue with n.p.o. status, NG tube to decompression.    Provide IV fluids, p.r.n. IV antiemetics and analgesics including IV morphine   for breakthrough pain.  Dr. Castillo, surgery has been contacted by ER to   continue consultation.  2.  History of liver transplant, on chronic immunosuppressant therapy.  The   patient will be placed on IV steroids.  Case discussed with Dr. Sudarshan Canada, her transplant physician at [facility] and agreed with continuation IV   steroids.  Hold her other immunosuppressants for now until can tolerate orals.  3.  Chronic bronchiolitis with chronic respiratory failure, continue with O2   support, place on respiratory and O2 protocols.  4.  Chronic pain, narcotic use.  We will provide continued analgesics,  IV   morphine for breakthrough symptoms.  5.  Thrombocytopenia, chronic.  We will monitor.  6.  Hypokalemia, attributed to gastrointestinal loss.  We will replace and   maintenance fluid, follow up electrolytes/potassium levels.  7.  History of gastroesophageal reflux disease, placed on IV Pepcid until she   can tolerate her oral outpatient medications.  8.  History of hypothyroidism.  Continue oral replacement therapy with   resolution of bowel obstruction.  9.  History of anxiety and depression, to be continued on Zoloft, outpatient   medication when tolerated.       ____________________________________     JOANA HARRIS MD    QBV / NTS    DD:  01/03/2017 18:19:13  DT:  01/03/2017 19:18:02    D#:  [id number]  Job#:  [id number]

## 2017-01-04 NOTE — PROGRESS NOTES
Late note day shift-Report received from KANDICE And pt received to MARGO At apprpx 1630 p.m . Pt reported nausea upon admission.Ng tube placed to low wall suction and minimal output noted until tube flushed. Pt also reported severe abominal pain.Abdomen softly distended.Morphine iv given. Spouse at bedside and assisted with admission history as pt initially reported she couldn't speak because of nausea. Pharmacist spole with pt re:her concerns over not receiving anti-rejection medications. notified by phone re:pt requests antianxiety medication.Ordrs received.Admission history completed.Pt assisted to commode.Refused use of bed alarm despite education as to risk of fall.

## 2017-01-04 NOTE — DISCHARGE PLANNING
55 yo woman admitted with SBO. Hx cirrhosis, ETOH abuse, s/p liver transplant on chronic immunosuppression tx. Patient has [facility], PCP is Dr. Reyes. Patient lives in [place] with her . She uses home oxygen at 4-6 lpm and is independent with her ADLs. Patient is currently NPO with NGT in place.  Anticipate return to home once she is medically cleared.

## 2017-01-04 NOTE — PROGRESS NOTES
Assumed care at 1845. Pt resting in bed. A&o x 4. NG to LIS. +output. NPO for now. abd soft. +hypoactive BS. Up to BSC with standby assist. O2 @4-6LNC> pt uses home O2. Pain well controlled for now. Pt usually takes trazadone at home for sleep. hospitalist ordered 25mg benadryl IV. mepilex to coccyx for comfort. Pt refused bed alarm despite education. Calls appropriately. Call light within reach. Hourly rounding in place.

## 2017-01-04 NOTE — PROGRESS NOTES
"0806: Assumed care of patient. Patient asking many questions regarding her diagnosis of small bowel obstruction. Answered questions as appropriate. Patient has normoactive bowel sounds in the right upper quadrant. Patient adamantly requesting Dr. Álvarez not be in her room due to her experience over two years ago. However, patient would also like her home medications ordered STAT. Educated patient on need to find new Hospitalist to take over her care. Per Dr. Álvarez, call Dr. Gann to see if he is able to take full responsibility of patient. Per Dr. Gann, patient has too many medical issues for him to take over care. Issue escalated to Charge RN, Lindsay Narayan.           0956: Patient called requesting pain medication, but also burst into tears regarding the Hospitalist issue. Patient educated that if she is refusing the Hospitalist, that at this time, her medications can not be ordered. Patient also informed that we are working on getting a new Hospitalist at this time. Informed patient issue has been escalated. Patient states she called the Nursing Administration and was told the same information this RN has been telling her. She states that she has been a  for multiple hospitals and worked at [employer] for 15 years and so she \"knows how this goes\". She states she would like to be transferred to [facility] or to another floor to avoid Dr. Álvarez. Educated patient that transferring to another floor is not feasible. Patient threatened that if we do not have her home medications ordered within the hour by a new Hospitalist, she will escalate the issue. Informed patient we are doing everything we can to get her a new MD and to have her medications ordered. Patient verbalizes nursing staff is doing everything they can but states, \"They are fooling you honey, nobody is trying to care for me. They are just pretending\". Reiterated that the problem has been escalated and this RN will keep patient updated, and that " everything is being done.

## 2017-01-04 NOTE — PROGRESS NOTES
Surgical Progress Note    Author: Venancio Castillo Date & Time created: 2017   8:35 AM     Interval Events:  Feels better this morning.  One episode of abdominal cramping when she woke up this morning, none since.  Denies flatus.    Review of Systems   Constitutional: Negative for fever and chills.   Respiratory: Negative.    Cardiovascular: Negative.    Gastrointestinal: Negative for nausea, vomiting and abdominal pain.     Hemodynamics:  Temp (24hrs), Av.4 °C (97.5 °F), Min:36.1 °C (96.9 °F), Max:36.7 °C (98.1 °F)  Temperature: 36.6 °C (97.9 °F)  Pulse  Av.8  Min: 62  Max: 86Heart Rate (Monitored): 78  Blood Pressure: 129/76 mmHg, NIBP: 145/66 mmHg     Respiratory:    Respiration: 18, Pulse Oximetry: 98 %        RUL Breath Sounds: Clear, RML Breath Sounds: Diminished, RLL Breath Sounds: Diminished, MANJINDER Breath Sounds: Clear, LLL Breath Sounds: Diminished  Neuro:  GCS = 15       Fluids:    Intake/Output Summary (Last 24 hours) at 17 0835  Last data filed at 17 0600   Gross per 24 hour   Intake   1200 ml   Output   1450 ml   Net   -250 ml        Current Diet Order   Procedures   • Diet NPO     Physical Exam   Constitutional: She is oriented to person, place, and time. She appears well-developed and well-nourished. No distress.   HENT:   Head: Normocephalic.   Eyes: Pupils are equal, round, and reactive to light. No scleral icterus.   Cardiovascular: Normal rate and regular rhythm.    Pulmonary/Chest: Effort normal and breath sounds normal.   Abdominal: Soft. Bowel sounds are normal. She exhibits no distension. There is no tenderness. There is no rebound.   Minimal bowel sounds present   Neurological: She is alert and oriented to person, place, and time.   Psychiatric: She has a normal mood and affect. Her behavior is normal. Judgment and thought content normal.     Labs:  No results found for this or any previous visit (from the past 24 hour(s)).  Medical Decision Making, by  Problem:  Active Hospital Problems    Diagnosis   • SBO (small bowel obstruction) ([facility]) [K56.69]     Plan:  Dulcolax suppository this morning.  A labs tomorrow.  Continue NPO, NGT decompression    Quality Measures:  Medications reviewed and Labs reviewed (No labs this morning)  Holbrook catheter: No Holbrook      DVT Prophylaxis: Enoxaparin (Lovenox)              Discussed patient condition with Patient

## 2017-01-04 NOTE — PROGRESS NOTES
out in hallway yelling at this RN about NGT not functioning. This RN trying to answer questions, but  keeps cutting this RN off and requesting whoever is above this RN. Charge RN Lindsay informed and at bedside. Patient and  more calm.

## 2017-01-04 NOTE — PROGRESS NOTES
Hospital Medicine Progress Note, Adult, Complex               Author: Sebas S Jose R Date & Time created: 1/4/2017  3:58 PM     Interval History:  CC: SBO  1/4: Reordered several home medications. Resumed lantus w/ holding params. Discussed not increasing narcotics given ileus. HypoK: repleting w/ K in IVF    Review of Systems:  Review of Systems   Constitutional: Negative for fever and chills.   Respiratory: Negative for cough, shortness of breath and wheezing.    Cardiovascular: Negative for chest pain.   Gastrointestinal: Positive for abdominal pain. Negative for nausea, vomiting, diarrhea and constipation.   Psychiatric/Behavioral: The patient is nervous/anxious.       Physical Exam:  Physical Exam   Constitutional: She appears well-developed.   HENT:   Head: Normocephalic.   NGT   Cardiovascular: Normal rate.  Exam reveals no gallop.    Pulmonary/Chest: No respiratory distress. She has no wheezes.   Abdominal: She exhibits distension. There is tenderness. There is no rebound and no guarding.   Neurological: She is alert.     Labs:        Invalid input(s): [id number]      Recent Labs      01/03/17   0513  01/04/17   1304   SODIUM  137  140   POTASSIUM  3.5*  4.5   CHLORIDE  99  107   CO2  30  25   BUN  18  12   CREATININE  1.21  0.91   CALCIUM  9.9  9.5     Recent Labs      01/03/17   0513  01/04/17   1304   ALTSGPT  17   --    ASTSGOT  19   --    ALKPHOSPHAT  27*   --    TBILIRUBIN  0.4   --    LIPASE  10*   --    GLUCOSE  135*  102*     Recent Labs      01/03/17   0513  01/04/17   1304   RBC  4.59  4.50   HEMOGLOBIN  12.9  12.8   HEMATOCRIT  38.7  38.6   PLATELETCT  82*  74*   PROTHROMBTM   --   13.3   INR   --   0.98     Recent Labs      01/03/17   0513  01/04/17   1304   WBC  5.0  4.3*   NEUTSPOLYS  69.70  80.70*   LYMPHOCYTES  20.30*  13.90*   MONOCYTES  7.00  4.00   EOSINOPHILS  2.20  0.50   BASOPHILS  0.40  0.70   ASTSGOT  19   --    ALTSGPT  17   --    ALKPHOSPHAT  27*   --    TBILIRUBIN  0.4   --             Hemodynamics:  Temp (24hrs), Av.4 °C (97.6 °F), Min:36.1 °C (96.9 °F), Max:36.7 °C (98.1 °F)  Temperature: 36.7 °C (98 °F)  Pulse  Av.7  Min: 62  Max: 86   Blood Pressure: 145/77 mmHg     Respiratory:    Respiration: 18, Pulse Oximetry: 95 %        RUL Breath Sounds: Clear, RML Breath Sounds: Diminished, RLL Breath Sounds: Diminished, MANJINDER Breath Sounds: Clear, LLL Breath Sounds: Diminished  Fluids:    Intake/Output Summary (Last 24 hours) at 17 1558  Last data filed at 17 1200   Gross per 24 hour   Intake   2000 ml   Output   1550 ml   Net    450 ml        GI/Nutrition:  Orders Placed This Encounter   Procedures   • Diet NPO     Standing Status: Standing      Number of Occurrences: 1      Standing Expiration Date:      Order Specific Question:  Restrict to:     Answer:  Strict [1]     Medical Decision Making, by Problem:  Active Hospital Problems    Diagnosis   • *SBO (small bowel obstruction) (HCC) [K56.69]  - versus ileus  - NGT  - surg [place] consulted   - minimize narcotics, discussed w/ pt   - clamp tube w/ oral meds    • Chronic respiratory failure (HCC) [J96.10]   • Diabetes mellitus, type 2 (HCC) [E11.9]  - Lantus + ISS   • Status post liver transplant Dr. Canada ([facility]) [Z94.4]  - cont prednisone, tacrolimus, mycophenolate    • Anxiety [F41.9]   • Hypothyroidism, adult [E03.9]  - cont levo    • GERD (gastroesophageal reflux disease) [K21.9]  - cont omeprazole    • Immunocompromised state (HCC) [D84.9]  - 2/2 antirejection meds        Labs reviewed and Medications reviewed  Holbrook catheter: No Holbrook      DVT Prophylaxis: Enoxaparin (Lovenox)

## 2017-01-05 ENCOUNTER — APPOINTMENT (OUTPATIENT)
Dept: RADIOLOGY | Facility: MEDICAL CENTER | Age: 57
DRG: 388 | End: 2017-01-05
Attending: SURGERY
Payer: MEDICARE

## 2017-01-05 VITALS
OXYGEN SATURATION: 99 % | WEIGHT: 178 LBS | HEART RATE: 86 BPM | DIASTOLIC BLOOD PRESSURE: 79 MMHG | BODY MASS INDEX: 26.98 KG/M2 | TEMPERATURE: 98.3 F | HEIGHT: 68 IN | RESPIRATION RATE: 18 BRPM | SYSTOLIC BLOOD PRESSURE: 141 MMHG

## 2017-01-05 LAB
ALBUMIN SERPL BCP-MCNC: 3.7 G/DL (ref 3.2–4.9)
ALBUMIN/GLOB SERPL: 1.9 G/DL
ALP SERPL-CCNC: 24 U/L (ref 30–99)
ALT SERPL-CCNC: 12 U/L (ref 2–50)
ANION GAP SERPL CALC-SCNC: 16 MMOL/L (ref 0–11.9)
AST SERPL-CCNC: 17 U/L (ref 12–45)
BASOPHILS # BLD AUTO: 0.6 % (ref 0–1.8)
BASOPHILS # BLD: 0.02 K/UL (ref 0–0.12)
BILIRUB SERPL-MCNC: 0.4 MG/DL (ref 0.1–1.5)
BUN SERPL-MCNC: 12 MG/DL (ref 8–22)
CALCIUM SERPL-MCNC: 8.7 MG/DL (ref 8.5–10.5)
CHLORIDE SERPL-SCNC: 107 MMOL/L (ref 96–112)
CO2 SERPL-SCNC: 22 MMOL/L (ref 20–33)
CREAT SERPL-MCNC: 0.82 MG/DL (ref 0.5–1.4)
EOSINOPHIL # BLD AUTO: 0.07 K/UL (ref 0–0.51)
EOSINOPHIL NFR BLD: 2.1 % (ref 0–6.9)
ERYTHROCYTE [DISTWIDTH] IN BLOOD BY AUTOMATED COUNT: 46.1 FL (ref 35.9–50)
GFR SERPL CREATININE-BSD FRML MDRD: >60 ML/MIN/1.73 M 2
GLOBULIN SER CALC-MCNC: 1.9 G/DL (ref 1.9–3.5)
GLUCOSE BLD-MCNC: 88 MG/DL (ref 65–99)
GLUCOSE SERPL-MCNC: 79 MG/DL (ref 65–99)
HCT VFR BLD AUTO: 35 % (ref 37–47)
HGB BLD-MCNC: 11.5 G/DL (ref 12–16)
IMM GRANULOCYTES # BLD AUTO: 0.03 K/UL (ref 0–0.11)
IMM GRANULOCYTES NFR BLD AUTO: 0.9 % (ref 0–0.9)
LYMPHOCYTES # BLD AUTO: 0.69 K/UL (ref 1–4.8)
LYMPHOCYTES NFR BLD: 20.4 % (ref 22–41)
MCH RBC QN AUTO: 28.5 PG (ref 27–33)
MCHC RBC AUTO-ENTMCNC: 32.9 G/DL (ref 33.6–35)
MCV RBC AUTO: 86.8 FL (ref 81.4–97.8)
MONOCYTES # BLD AUTO: 0.3 K/UL (ref 0–0.85)
MONOCYTES NFR BLD AUTO: 8.8 % (ref 0–13.4)
NEUTROPHILS # BLD AUTO: 2.28 K/UL (ref 2–7.15)
NEUTROPHILS NFR BLD: 67.2 % (ref 44–72)
NRBC # BLD AUTO: 0 K/UL
NRBC BLD AUTO-RTO: 0 /100 WBC
PLATELET # BLD AUTO: 57 K/UL (ref 164–446)
PMV BLD AUTO: 9.2 FL (ref 9–12.9)
POTASSIUM SERPL-SCNC: 4.2 MMOL/L (ref 3.6–5.5)
PROT SERPL-MCNC: 5.6 G/DL (ref 6–8.2)
RBC # BLD AUTO: 4.03 M/UL (ref 4.2–5.4)
SODIUM SERPL-SCNC: 145 MMOL/L (ref 135–145)
WBC # BLD AUTO: 3.4 K/UL (ref 4.8–10.8)

## 2017-01-05 PROCEDURE — 80053 COMPREHEN METABOLIC PANEL: CPT

## 2017-01-05 PROCEDURE — 700102 HCHG RX REV CODE 250 W/ 637 OVERRIDE(OP): Performed by: HOSPITALIST

## 2017-01-05 PROCEDURE — 700101 HCHG RX REV CODE 250: Performed by: HOSPITALIST

## 2017-01-05 PROCEDURE — 700102 HCHG RX REV CODE 250 W/ 637 OVERRIDE(OP): Performed by: SURGERY

## 2017-01-05 PROCEDURE — 700111 HCHG RX REV CODE 636 W/ 250 OVERRIDE (IP): Performed by: HOSPITALIST

## 2017-01-05 PROCEDURE — 36415 COLL VENOUS BLD VENIPUNCTURE: CPT

## 2017-01-05 PROCEDURE — 82962 GLUCOSE BLOOD TEST: CPT

## 2017-01-05 PROCEDURE — 74250 X-RAY XM SM INT 1CNTRST STD: CPT

## 2017-01-05 PROCEDURE — 99239 HOSP IP/OBS DSCHRG MGMT >30: CPT | Performed by: HOSPITALIST

## 2017-01-05 PROCEDURE — A9270 NON-COVERED ITEM OR SERVICE: HCPCS | Performed by: HOSPITALIST

## 2017-01-05 PROCEDURE — A9270 NON-COVERED ITEM OR SERVICE: HCPCS | Performed by: SURGERY

## 2017-01-05 PROCEDURE — 85025 COMPLETE CBC W/AUTO DIFF WBC: CPT

## 2017-01-05 PROCEDURE — 700111 HCHG RX REV CODE 636 W/ 250 OVERRIDE (IP): Performed by: NURSE PRACTITIONER

## 2017-01-05 RX ORDER — POLYETHYLENE GLYCOL 3350 17 G/17G
17 POWDER, FOR SOLUTION ORAL DAILY
Qty: 1 BOTTLE | Refills: 3 | Status: ON HOLD | COMMUNITY
End: 2017-01-16

## 2017-01-05 RX ADMIN — ENOXAPARIN SODIUM 40 MG: 100 INJECTION SUBCUTANEOUS at 08:35

## 2017-01-05 RX ADMIN — MORPHINE SULFATE 2 MG: 4 INJECTION INTRAVENOUS at 12:50

## 2017-01-05 RX ADMIN — MORPHINE SULFATE 2 MG: 4 INJECTION INTRAVENOUS at 00:55

## 2017-01-05 RX ADMIN — MYCOPHENOLATE MOFETIL 500 MG: 250 CAPSULE ORAL at 08:36

## 2017-01-05 RX ADMIN — MORPHINE SULFATE 2 MG: 4 INJECTION INTRAVENOUS at 08:48

## 2017-01-05 RX ADMIN — OMEPRAZOLE 40 MG: 20 CAPSULE, DELAYED RELEASE ORAL at 08:36

## 2017-01-05 RX ADMIN — MORPHINE SULFATE 2 MG: 4 INJECTION INTRAVENOUS at 04:12

## 2017-01-05 RX ADMIN — LEVOTHYROXINE SODIUM 137 MCG: 137 TABLET ORAL at 08:35

## 2017-01-05 RX ADMIN — HYDROMORPHONE HYDROCHLORIDE 1 MG: 1 INJECTION, SOLUTION INTRAMUSCULAR; INTRAVENOUS; SUBCUTANEOUS at 05:06

## 2017-01-05 RX ADMIN — LORAZEPAM 1 MG: 2 INJECTION INTRAMUSCULAR; INTRAVENOUS at 12:49

## 2017-01-05 RX ADMIN — TACROLIMUS 2 MG: 1 CAPSULE ORAL at 08:38

## 2017-01-05 RX ADMIN — LORAZEPAM 1 MG: 2 INJECTION INTRAMUSCULAR; INTRAVENOUS at 06:42

## 2017-01-05 RX ADMIN — AMBRISENTAN 10 MG: 10 TABLET, FILM COATED ORAL at 09:00

## 2017-01-05 RX ADMIN — BISACODYL 10 MG: 10 SUPPOSITORY RECTAL at 08:34

## 2017-01-05 RX ADMIN — POTASSIUM CHLORIDE AND SODIUM CHLORIDE: 900; 150 INJECTION, SOLUTION INTRAVENOUS at 06:42

## 2017-01-05 RX ADMIN — CYCLOBENZAPRINE HYDROCHLORIDE 10 MG: 10 TABLET, FILM COATED ORAL at 08:35

## 2017-01-05 ASSESSMENT — ENCOUNTER SYMPTOMS
VOMITING: 0
NAUSEA: 0
ABDOMINAL PAIN: 1

## 2017-01-05 ASSESSMENT — PAIN SCALES - GENERAL
PAINLEVEL_OUTOF10: 9
PAINLEVEL_OUTOF10: 7
PAINLEVEL_OUTOF10: 6
PAINLEVEL_OUTOF10: 7
PAINLEVEL_OUTOF10: 8
PAINLEVEL_OUTOF10: 6
PAINLEVEL_OUTOF10: 7

## 2017-01-05 NOTE — PROGRESS NOTES
Pt had some relief after ativan dose. Slept for a couple of hours. After waking up, pt tearful again. Stating she's in pain and she thinks it might be her lungs or kidneys. Lungs sound clear. 93% on 4LNC.  Voiding well. VSS. Will give the the dilaudid for now and reassess.

## 2017-01-05 NOTE — PROGRESS NOTES
Late entry, at 1000 down per gurney to xray, abd is soft and tender, IV infusing, steady when up. NG clamped this am for pills and still clamped for xray.

## 2017-01-05 NOTE — PROGRESS NOTES
Assumed care at 1845. Pt resting in bed comfortably . A&ox 4. C/o abd pain. Morphine 2 mg given. Ambulating to br with standby assist. O2 @ 4-5LNC. Uses home O2. NG to LIS. +BS. Denies flatus or bm since this AM. Around 2200, pt is tearful complaining of new onset of sharp pain in left side from under the breast to abd. Demanding to call the doctor. abd was soft upon assessment. VSS. Ativan 1mg given. hospitalist was notified. Ordered for a 1 time dose of dilaudid 1mg. Upon coming back to pt room, pt is already asleep. No meds given for now. Will reassess patient.

## 2017-01-05 NOTE — DISCHARGE INSTRUCTIONS
Discharge Instructions    Discharged to home by car with relative. Discharged via wheelchair, hospital escort: Yes.  Special equipment needed: Oxygen    Be sure to schedule a follow-up appointment with your primary care doctor or any specialists as instructed.     Discharge Plan:   Diet Plan: Discussed (warm liquids today increase as tolerated)  Activity Level: Discussed (no strenuous activity)  Confirmed Follow up Appointment: Patient to Call and Schedule Appointment  Confirmed Symptoms Management: Discussed  Medication Reconciliation Updated: Yes  Pneumococcal Vaccine Given - only chart on this line when given: Given (See MAR)  Influenza Vaccine Indication: Not indicated: Previously immunized this influenza season and > 8 years of age    I understand that a diet low in cholesterol, fat, and sodium is recommended for good health. Unless I have been given specific instructions below for another diet, I accept this instruction as my diet prescription.   Other diet: warm liquid diet today and increase as tolerated    Special Instructions: None    · Is patient discharged on Warfarin / Coumadin?   No     · Is patient Post Blood Transfusion?  No  Follow up with jose PISANO As needed or return to the ED for return of symptoms, be sure to continue all laxatives to keep your bowels moving.     Depression / Suicide Risk    As you are discharged from this [facility] facility, it is important to learn how to keep safe from harming yourself.    Recognize the warning signs:  · Abrupt changes in personality, positive or negative- including increase in energy   · Giving away possessions  · Change in eating patterns- significant weight changes-  positive or negative  · Change in sleeping patterns- unable to sleep or sleeping all the time   · Unwillingness or inability to communicate  · Depression  · Unusual sadness, discouragement and loneliness  · Talk of wanting to die  · Neglect of personal appearance   · Rebelliousness-  reckless behavior  · Withdrawal from people/activities they love  · Confusion- inability to concentrate     If you or a loved one observes any of these behaviors or has concerns about self-harm, here's what you can do:  · Talk about it- your feelings and reasons for harming yourself  · Remove any means that you might use to hurt yourself (examples: pills, rope, extension cords, firearm)  · Get professional help from the community (Mental Health, Substance Abuse, psychological counseling)  · Do not be alone:Call your Safe Contact- someone whom you trust who will be there for you.  · Call your local CRISIS HOTLINE 360-1558 or 075-122-1754  · Call your local Children's Mobile Crisis Response Team [place] (250) 151-3772 or www.Easy-Point  · Call the toll free National Suicide Prevention Hotlines   · National Suicide Prevention Lifeline 793-336-VVUG (5342)  · National Hope Line Network 800-SUICIDE (881-0792)

## 2017-01-05 NOTE — PROGRESS NOTES
Crying upset requesting Dr. Odell be called, states she is not getting better and requests to talk to  About condition, Dr. Odell called.

## 2017-01-05 NOTE — PROGRESS NOTES
Pt very calm now after talking with Dr. Odell, states she will follow up as needed states she will follow up with warm liquid diet today, states 2mg of morphine which she has been on here, is not as much pain medication as she already takes at home. States she will follow up with liver DrReal If needed. NG tube removed without any problem.

## 2017-01-05 NOTE — CARE PLAN
Problem: Safety  Goal: Will remain free from injury  Updated about POC. Reinforce call light use. Pt acknowledged understanding.     Problem: Bowel/Gastric:  Goal: Normal bowel function is maintained or improved  Denies n/v.  NG to LIS.     Problem: Pain Management  Goal: Pain level will decrease to patient’s comfort goal  Pain well controlled with morphine 2mg q3  prn

## 2017-01-05 NOTE — CARE PLAN
Problem: Nutritional:  Goal: Achieve adequate nutritional intake  Diet to advance and Patient will consume 50% of meals  Outcome: NOT MET

## 2017-01-05 NOTE — DIETARY
"Nutrition Services: Consult for BPA from nutrition admit screen. This is a 56 y.o female with admit dx:SBO (small bowel obstruction) (HCC). Ht: 5'8\"  Wt: 80.7 kg  BMI: 27.07 kg/m2    Per H&P:\"Patient with abdominal pain with distention, nausea, vomiting, PTA. Patient with history of liver transplant and gastric sleeve in 12/2015.\" The patient is currently strict NPO with NGT in place. RD met with the patient today, patient reports s/p gastric sleeve over 12 months ago and has lost ~100#. Patient reports that she takes \" Bariatric Vitamins\" at home. Per home medications in H&P- calcitriol plus Vitamin D and MVI are noted. Labs reviewed. Meds: Lantus, SSI, Synthroid, Prilosec, Deltasone. IVF: 0.9% NaCl with KCL at 100 ml/hr. GI: last BM on 1/4 per chart. No skin breakdown is noted per chart    Plan/Recommendations: Advance the diet when medically feasible, fluids per MD, consider to add home supplements once diet advances   RD monitoring     "

## 2017-01-05 NOTE — DISCHARGE PLANNING
Informed by CNA that patient is requesting to talk to her . Spoke with patient who requested that we transfer her to [facility]. She said that her medical hx and needs are very complex and the MDs there know her and are better equipped to care for her. I informed her that we typically only transfer patients to another facility if they are out of network or if they require medical care that we are unable to provide here. I told her that I am unable to initiate transferring her to [facility] since there has been no indication from her MDs that she requires transfer to a tertiary care facility.  Patient was very emotional and expressed frustration that she has been trying to get transferred to [facility] since she came to our ER. I attempted to provide emotional support and encouraged her to address her concerns with her MD.

## 2017-01-06 RX ORDER — NYSTATIN 100000 [USP'U]/G
POWDER TOPICAL
Qty: 45 G | Refills: 1 | Status: ON HOLD | OUTPATIENT
Start: 2017-01-06 | End: 2017-01-20 | Stop reason: SDUPTHER

## 2017-01-06 RX ORDER — PRENATAL VIT 75/IRON/FOLIC/OM3 28-800-440
COMBINATION PACKAGE (EA) ORAL
Qty: 120 TAB | Refills: 0 | Status: ON HOLD | OUTPATIENT
Start: 2017-01-06 | End: 2017-01-20 | Stop reason: SDUPTHER

## 2017-01-06 NOTE — TELEPHONE ENCOUNTER
Was the patient seen in the last year in this department? Yes     Does patient have an active prescription for medications requested? No     Received Request Via: Patient      Pt met protocol?: Yes  OV 10/17/16  Prescribed by not in system provider

## 2017-01-06 NOTE — TELEPHONE ENCOUNTER
Dr. Reyes, This is a historical med prescribed in the hospital.  Do you want to wait until OV (1/11/17) to evaluate?

## 2017-01-06 NOTE — DISCHARGE SUMMARY
Hospital Medicine Discharge Note     Admit Date:  1/3/2017       Discharge Date:   1/5/2017    Attending Physician:  Sebas Odell     Diagnoses (includes active and resolved):   Principal Problem:    SBO (small bowel obstruction) (HCC) POA: Yes  Active Problems:    Status post liver transplant Dr. Canada ([facility]) POA: Yes      Overview: -December 2011: Status post liver transplant for end stage liver disease       at [facility], followed by Dr. Canada.          Diabetes mellitus, type 2 (HCC) POA: Yes    Chronic respiratory failure (HCC) POA: Yes    Hypothyroidism, adult POA: Yes    Anxiety POA: Yes    Immunocompromised state (HCC) POA: Yes    GERD (gastroesophageal reflux disease) POA: Yes  Resolved Problems:    * No resolved hospital problems. *      Hospital Summary (Brief Narrative):       56-year-old female w/h/o liver transplant, on chronic immunosuppressants, chronic bronchiolitis, chronic respiratory failure, O2 dependent 4-6 liters nasal cannula, pulmonary hypertension and sarcoidosis p/w SBO. She has had approximately 100 pound weight loss since her gastric sleeve surgery in 12/2015.  She was admitted and found to have SBO versus ileus. Surgery Erlinda was consulted. An NG tube was placed to suction. Patient had a bowel movement. She continued to have abdominal pain. Her suction was clamped so that she could get her antirejection meds. She then had a suppository and had another bowel movement. A small bowel series did show normal passes of contrast and no obstruction. The case was discussed with her hepatologist Dr. Galvez. Patient improved and as she was able to tolerate the contrast without vomiting, she was able to be discharged home.      Consultants:      Surg Anna    Procedures:        None    Discharge Medications:           Medication List      CHANGE how you take these medications       Instructions    ondansetron 4 MG Tbdp   What changed:    - reasons to take this  - additional  instructions   Commonly known as:  ZOFRAN ODT   Next Dose Due:  As needed    Take 1 Tab by mouth every 8 hours as needed. Nausea and vomiting   Dose:  4 mg       * PredniSONE 2 MG Tbec   What changed:  Another medication with the same name was changed. Make sure you understand how and when to take each.   Next Dose Due:  today    Take 2 mg by mouth every day. Pt has another RX for 5MG Pt takes total of 7MG   Dose:  2 mg       * predniSONE 5 MG Tabs   What changed:    - how much to take  - how to take this  - when to take this  - additional instructions   Last time this was given:  7 mg on 1/4/2017  1:07 PM   Commonly known as:  DELTASONE   Next Dose Due:  Itoday    Doctor's comments:  In addition to prednisone 1 mg prescription   1 to 4 tabs by mouth daily as directed       trazodone 50 MG Tabs   What changed:    - when to take this  - reasons to take this   Commonly known as:  DESYREL   Next Dose Due:  tonight    Take 1-2 Tabs by mouth every bedtime.   Dose:   mg       * Notice:  This list has 2 medication(s) that are the same as other medications prescribed for you. Read the directions carefully, and ask your doctor or other care provider to review them with you.      CONTINUE taking these medications       Instructions    ADCIRCA 20 MG Tabs   Last time this was given:  40 mg on 1/4/2017  9:00 PM   Generic drug:  Tadalafil (PAH)   Next Dose Due:  tonight    Take 40 mg by mouth every evening.   Dose:  40 mg       aspirin EC 81 MG Tbec   Commonly known as:  ECOTRIN   Next Dose Due:  In am    Take 81 mg by mouth every morning.   Dose:  81 mg       BARIATRIC FUSION Chew   Next Dose Due:  tonight    Take 1 Tab by mouth every evening.   Dose:  1 Tab       CELLCEPT 250 MG Caps   Last time this was given:  500 mg on 1/5/2017  8:36 AM   Generic drug:  mycophenolate   Next Dose Due:  tonight    Take 500 mg by mouth 2 times a day.   Dose:  500 mg       CITRACAL + D PO   Next Dose Due:  today    Take 1 Tab by mouth 2  Times a Day. @@ 0700 and 1800   Dose:  1 Tab       cyclobenzaprine 10 MG Tabs   Last time this was given:  10 mg on 1/5/2017  8:35 AM   Commonly known as:  FLEXERIL   Next Dose Due:  As needed    TAKE 1 TABLET BY MOUTH 3 TIMES A DAY AS NEEDED FOR MUSCLE SPASMS       docusate sodium 100 MG Caps   Commonly known as:  COLACE   Next Dose Due:  tonight    Take 100 mg by mouth 2 times a day.   Dose:  100 mg       furosemide 40 MG Tabs   Commonly known as:  LASIX   Next Dose Due:  today    Take 20 mg by mouth every day.   Dose:  20 mg       gabapentin 600 MG tablet   Commonly known as:  NEURONTIN   Next Dose Due:  As needed    Doctor's comments:  Authorization of a refill request.   TAKE 1 TABLET BY MOUTH 3 TIMES A DAY       insulin glargine 100 UNIT/ML Soln   Commonly known as:  LANTUS   Next Dose Due:  In am    Inject 8 Units as instructed every morning.   Dose:  8 Units       LETAIRIS 10 MG tablet   Last time this was given:  10 mg on 1/5/2017  9:00 AM   Generic drug:  ambrisentan    Take 10 mg by mouth every day.   Dose:  10 mg       levothyroxine 137 MCG Tabs   Last time this was given:  137 mcg on 1/5/2017  8:35 AM   Commonly known as:  SYNTHROID   Next Dose Due:  tomorrow    Doctor's comments:  This is an authorization for a requested refill   TAKE 1 TABLET BY MOUTH EVERY DAY       omeprazole 40 MG delayed-release capsule   Last time this was given:  40 mg on 1/5/2017  8:36 AM   Commonly known as:  PRILOSEC   Next Dose Due:  In am    Take 1 Cap by mouth every day.   Dose:  40 mg       oxycodone immediate release 10 MG immediate release tablet   Commonly known as:  ROXICODONE   Next Dose Due:  As needed    Take 10 mg by mouth 3 times a day. @0700, 1200, and 2000   Dose:  10 mg       polyethylene glycol 3350 Powd   Commonly known as:  MIRALAX   Next Dose Due:  As needed    Take 17 g by mouth every day.   Dose:  17 g       pravastatin 20 MG Tabs   Commonly known as:  PRAVACHOL   Next Dose Due:  tonight    Take 1 Tab by  mouth every day.   Dose:  20 mg       PROBIOTIC DAILY PO   Next Dose Due:  As needed    Take 1 Cap by mouth every evening.   Dose:  1 Cap       sennosides 8.6 MG Tabs   Commonly known as:  SENOKOT   Next Dose Due:  needed    Take 17.2 mg by mouth 2 times a day.   Dose:  17.2 mg       sertraline 100 MG Tabs   Last time this was given:  100 mg on 1/4/2017  9:03 PM   Commonly known as:  ZOLOFT   Next Dose Due:  tonight    Take 1 Tab by mouth every evening.   Dose:  100 mg       tacrolimus 1 MG Caps   Last time this was given:  2 mg on 1/5/2017  8:38 AM   Commonly known as:  PROGRAF   Next Dose Due:  tonight    Take 1.5-2 mg by mouth 2 times a day. Pt takes: 2MG in AM 1.5MG HS   Dose:  1.5-2 mg         STOP taking these medications          amoxicillin 500 MG Caps   Commonly known as:  AMOXIL       HYDROmorphone 8 MG tablet   Commonly known as:  DILAUDID           Disposition:   Discharge home    Diet:   Advance slowly    Activity:   As tolerated    Code status:   Full code    Primary Care Provider:    Bernarda Reyes D.O.    Follow up appointment details :      Bernarda Reyes D.O.  [address]  [facility] [place] [facility] [address]  [phone]          Bernarda Reyes D.O.  [address]  [facility] [address]  [phone]          Future Appointments  Date Time Provider Department Center   1/11/2017 8:00 AM LAB [facility] None   [facility]2017 [facility]   1/18/2017 8:30 AM ED [facility] 1 [facility]   1/20/2017 1:20 PM [facility] D.O. [facility] [place]   2/9/2017 1:00 PM [facility] D.O. [facility] [place]   2/15/2017 8:00 AM LAB [facility] None   3/8/2017 8:00 AM LAB [facility] None   4/3/2017 10:30 AM Maxwell Phan M.D. [facility] None       Pending Studies:        None    Time spent on discharge day patient visit: 49 minutes    #################################################    Interval history/exam for day of discharge:    Filed Vitals:    01/04/17 2232 01/05/17  0412 01/05/17 0655 01/05/17 1224   BP: 129/78 145/76 137/73 141/79   Pulse: 72 75 80 86   Temp: 37.1 °C (98.7 °F) 37.3 °C (99.1 °F) 36.7 °C (98.1 °F) 36.8 °C (98.3 °F)   Resp: 19 18 18 18   Height:       Weight:       SpO2: 94% 93% 92% 99%     Weight/BMI: Body mass index is 27.07 kg/(m^2).  Pulse Oximetry: 99 %, O2 (LPM): 4, O2 Delivery: Nasal Cannula    General:  NAD  CVS:  RRR  PULM:  CTAB, no respiratory distress  Abd: TTP improved    Most Recent Labs:    Lab Results   Component Value Date/Time    WBC 3.4* 01/05/2017 08:41 AM    RBC 4.03* 01/05/2017 08:41 AM    HEMOGLOBIN 11.5* 01/05/2017 08:41 AM    HEMATOCRIT 35.0* 01/05/2017 08:41 AM    MCV 86.8 01/05/2017 08:41 AM    MCH 28.5 01/05/2017 08:41 AM    MCHC 32.9* 01/05/2017 08:41 AM    MPV 9.2 01/05/2017 08:41 AM    NEUTROPHILS-POLYS 67.20 01/05/2017 08:41 AM    LYMPHOCYTES 20.40* 01/05/2017 08:41 AM    MONOCYTES 8.80 01/05/2017 08:41 AM    EOSINOPHILS 2.10 01/05/2017 08:41 AM    BASOPHILS 0.60 01/05/2017 08:41 AM    HYPOCHROMIA 1+ 08/05/2014 08:16 AM    ANISOCYTOSIS 1+ 07/10/2016 04:42 PM      Lab Results   Component Value Date/Time    SODIUM 145 01/05/2017 08:41 AM    POTASSIUM 4.2 01/05/2017 08:41 AM    CHLORIDE 107 01/05/2017 08:41 AM    CO2 22 01/05/2017 08:41 AM    GLUCOSE 79 01/05/2017 08:41 AM    BUN 12 01/05/2017 08:41 AM    CREATININE 0.82 01/05/2017 08:41 AM      Lab Results   Component Value Date/Time    ALT(SGPT) 12 01/05/2017 08:41 AM    AST(SGOT) 17 01/05/2017 08:41 AM    ALKALINE PHOSPHATASE 24* 01/05/2017 08:41 AM    TOTAL BILIRUBIN 0.4 01/05/2017 08:41 AM    DIRECT BILIRUBIN <0.1 10/19/2016 07:57 AM    LIPASE 10* 01/03/2017 05:13 AM    ALBUMIN 3.7 01/05/2017 08:41 AM    GLOBULIN 1.9 01/05/2017 08:41 AM    PRE-ALBUMIN 4.8* 03/08/2010 03:50 AM    INR 0.98 01/04/2017 01:04 PM    MACROCYTOSIS 1+ 03/12/2012 06:51 AM     Lab Results   Component Value Date/Time    PT 13.3 01/04/2017 01:04 PM    INR 0.98 01/04/2017 01:04 PM        Imaging/ Testing:       DX-SMALL BOWEL SERIES   Final Result      No evidence of small bowel obstruction.      CT-ABDOMEN-PELVIS WITH   Final Result      1.  Small bowel dilatation with evidence of bowel stasis in the proximal jejunum. Transition point in the left upper quadrant suggestive of at least a partial small bowel obstruction.      2.  Postoperative changes from prior liver transplant.      3.  Splenomegaly with hypodense splenic lesion, unchanged.          Instructions:      The patient was instructed to return to the ER in the event of worsening symptoms. I have counseled the patient on the importance of compliance and the patient has agreed to proceed with all medical recommendations and follow up plan indicated above.   The patient understands that all medications come with benefits and risks. Risks may include permanent injury or death and these risks can be minimized with close reassessment and monitoring.

## 2017-01-11 ENCOUNTER — HOSPITAL ENCOUNTER (OUTPATIENT)
Dept: LAB | Facility: MEDICAL CENTER | Age: 57
End: 2017-01-11
Attending: FAMILY MEDICINE
Payer: MEDICARE

## 2017-01-11 ENCOUNTER — HOSPITAL ENCOUNTER (EMERGENCY)
Facility: MEDICAL CENTER | Age: 57
End: 2017-01-12
Attending: EMERGENCY MEDICINE
Payer: MEDICARE

## 2017-01-11 ENCOUNTER — HOSPITAL ENCOUNTER (EMERGENCY)
Facility: MEDICAL CENTER | Age: 57
End: 2017-01-11
Payer: MEDICARE

## 2017-01-11 ENCOUNTER — APPOINTMENT (OUTPATIENT)
Dept: RADIOLOGY | Facility: MEDICAL CENTER | Age: 57
End: 2017-01-11
Attending: EMERGENCY MEDICINE
Payer: MEDICARE

## 2017-01-11 ENCOUNTER — HOSPITAL ENCOUNTER (OUTPATIENT)
Dept: LAB | Facility: MEDICAL CENTER | Age: 57
End: 2017-01-11
Attending: PHYSICIAN ASSISTANT
Payer: MEDICARE

## 2017-01-11 ENCOUNTER — HOSPITAL ENCOUNTER (OUTPATIENT)
Dept: LAB | Facility: MEDICAL CENTER | Age: 57
End: 2017-01-11
Attending: INTERNAL MEDICINE
Payer: MEDICARE

## 2017-01-11 VITALS
RESPIRATION RATE: 18 BRPM | OXYGEN SATURATION: 100 % | TEMPERATURE: 97.8 F | DIASTOLIC BLOOD PRESSURE: 49 MMHG | WEIGHT: 175.27 LBS | HEIGHT: 68 IN | SYSTOLIC BLOOD PRESSURE: 119 MMHG | HEART RATE: 73 BPM | BODY MASS INDEX: 26.56 KG/M2

## 2017-01-11 DIAGNOSIS — R10.84 GENERALIZED ABDOMINAL PAIN: ICD-10-CM

## 2017-01-11 DIAGNOSIS — K59.00 CONSTIPATION, UNSPECIFIED CONSTIPATION TYPE: ICD-10-CM

## 2017-01-11 DIAGNOSIS — E11.9 CONTROLLED TYPE 2 DIABETES MELLITUS WITHOUT COMPLICATION, WITHOUT LONG-TERM CURRENT USE OF INSULIN (HCC): ICD-10-CM

## 2017-01-11 DIAGNOSIS — I27.21 PAH (PULMONARY ARTERY HYPERTENSION) (HCC): ICD-10-CM

## 2017-01-11 LAB
25(OH)D3 SERPL-MCNC: 51 NG/ML (ref 30–100)
ALBUMIN SERPL BCP-MCNC: 4.2 G/DL (ref 3.2–4.9)
ALBUMIN SERPL BCP-MCNC: 4.4 G/DL (ref 3.2–4.9)
ALBUMIN SERPL BCP-MCNC: 4.4 G/DL (ref 3.2–4.9)
ALBUMIN/GLOB SERPL: 1.8 G/DL
ALBUMIN/GLOB SERPL: 2 G/DL
ALBUMIN/GLOB SERPL: 2 G/DL
ALP SERPL-CCNC: 26 U/L (ref 30–99)
ALP SERPL-CCNC: 26 U/L (ref 30–99)
ALP SERPL-CCNC: 29 U/L (ref 30–99)
ALT SERPL-CCNC: 14 U/L (ref 2–50)
ALT SERPL-CCNC: 14 U/L (ref 2–50)
ALT SERPL-CCNC: 18 U/L (ref 2–50)
ANION GAP SERPL CALC-SCNC: 3 MMOL/L (ref 0–11.9)
ANION GAP SERPL CALC-SCNC: 6 MMOL/L (ref 0–11.9)
ANION GAP SERPL CALC-SCNC: 8 MMOL/L (ref 0–11.9)
APPEARANCE UR: CLEAR
AST SERPL-CCNC: 16 U/L (ref 12–45)
AST SERPL-CCNC: 18 U/L (ref 12–45)
AST SERPL-CCNC: 29 U/L (ref 12–45)
BASOPHILS # BLD AUTO: 0.03 K/UL (ref 0–0.12)
BASOPHILS # BLD AUTO: 0.5 % (ref 0–1.8)
BASOPHILS # BLD: 0.02 K/UL (ref 0–0.12)
BASOPHILS NFR BLD AUTO: 0.8 % (ref 0–1.8)
BILIRUB CONJ SERPL-MCNC: <0.1 MG/DL (ref 0.1–0.5)
BILIRUB INDIRECT SERPL-MCNC: NORMAL MG/DL (ref 0–1)
BILIRUB SERPL-MCNC: 0.3 MG/DL (ref 0.1–1.5)
BILIRUB SERPL-MCNC: 0.3 MG/DL (ref 0.1–1.5)
BILIRUB SERPL-MCNC: 0.5 MG/DL (ref 0.1–1.5)
BUN SERPL-MCNC: 15 MG/DL (ref 8–22)
BUN SERPL-MCNC: 15 MG/DL (ref 8–22)
BUN SERPL-MCNC: 16 MG/DL (ref 8–22)
CALCIUM SERPL-MCNC: 9.3 MG/DL (ref 8.4–10.2)
CALCIUM SERPL-MCNC: 9.3 MG/DL (ref 8.5–10.5)
CALCIUM SERPL-MCNC: 9.3 MG/DL (ref 8.5–10.5)
CHLORIDE SERPL-SCNC: 101 MMOL/L (ref 96–112)
CHLORIDE SERPL-SCNC: 103 MMOL/L (ref 96–112)
CHLORIDE SERPL-SCNC: 104 MMOL/L (ref 96–112)
CHOLEST SERPL-MCNC: 151 MG/DL (ref 100–199)
CO2 SERPL-SCNC: 31 MMOL/L (ref 20–33)
CO2 SERPL-SCNC: 33 MMOL/L (ref 20–33)
CO2 SERPL-SCNC: 34 MMOL/L (ref 20–33)
COLOR UR: YELLOW
CREAT SERPL-MCNC: 1.14 MG/DL (ref 0.5–1.4)
CREAT SERPL-MCNC: 1.14 MG/DL (ref 0.5–1.4)
CREAT SERPL-MCNC: 1.15 MG/DL (ref 0.5–1.4)
CREAT UR-MCNC: 133.5 MG/DL
EKG IMPRESSION: NORMAL
EOSINOPHIL # BLD AUTO: 0.1 K/UL (ref 0–0.51)
EOSINOPHIL # BLD: 0.13 K/UL (ref 0–0.51)
EOSINOPHIL NFR BLD AUTO: 3.6 % (ref 0–6.9)
EOSINOPHIL NFR BLD: 2.6 % (ref 0–6.9)
ERYTHROCYTE [DISTWIDTH] IN BLOOD BY AUTOMATED COUNT: 46.5 FL (ref 35.9–50)
ERYTHROCYTE [DISTWIDTH] IN BLOOD BY AUTOMATED COUNT: 49.7 FL (ref 35.9–50)
EST. AVERAGE GLUCOSE BLD GHB EST-MCNC: 103 MG/DL
FERRITIN SERPL-MCNC: 25.7 NG/ML (ref 10–291)
GFR SERPL CREATININE-BSD FRML MDRD: 49 ML/MIN/1.73 M 2
GGT SERPL-CCNC: 8 U/L (ref 7–34)
GLOBULIN SER CALC-MCNC: 2.2 G/DL (ref 1.9–3.5)
GLOBULIN SER CALC-MCNC: 2.2 G/DL (ref 1.9–3.5)
GLOBULIN SER CALC-MCNC: 2.3 G/DL (ref 1.9–3.5)
GLUCOSE SERPL-MCNC: 113 MG/DL (ref 65–99)
GLUCOSE SERPL-MCNC: 63 MG/DL (ref 65–99)
GLUCOSE SERPL-MCNC: 66 MG/DL (ref 65–99)
GLUCOSE UR STRIP.AUTO-MCNC: NEGATIVE MG/DL
HBA1C MFR BLD: 5.2 % (ref 0–5.6)
HCT VFR BLD AUTO: 33.5 % (ref 37–47)
HCT VFR BLD AUTO: 36.7 % (ref 37–47)
HCT VFR BLD AUTO: 37 % (ref 37–47)
HDLC SERPL-MCNC: 47 MG/DL
HGB BLD-MCNC: 11.1 G/DL (ref 12–16)
HGB BLD-MCNC: 11.9 G/DL (ref 12–16)
IMM GRANULOCYTES # BLD AUTO: 0.01 K/UL (ref 0–0.11)
IMM GRANULOCYTES # BLD AUTO: 0.01 K/UL (ref 0–0.11)
IMM GRANULOCYTES NFR BLD AUTO: 0.3 % (ref 0–0.9)
IMM GRANULOCYTES NFR BLD AUTO: 0.3 % (ref 0–0.9)
IRON SATN MFR SERPL: 10 % (ref 15–55)
IRON SERPL-MCNC: 34 UG/DL (ref 40–170)
KETONES UR STRIP.AUTO-MCNC: NEGATIVE MG/DL
LEUKOCYTE ESTERASE UR QL STRIP.AUTO: NEGATIVE
LIPASE SERPL-CCNC: 25 U/L (ref 7–58)
LYMPHOCYTES # BLD AUTO: 1.11 K/UL (ref 1–4.8)
LYMPHOCYTES # BLD: 1.23 K/UL (ref 1–4.8)
LYMPHOCYTES NFR BLD AUTO: 34.2 % (ref 22–41)
LYMPHOCYTES NFR BLD: 28.5 % (ref 22–41)
MAGNESIUM SERPL-MCNC: 2.2 MG/DL (ref 1.5–2.5)
MAGNESIUM SERPL-MCNC: 2.2 MG/DL (ref 1.5–2.5)
MCH RBC QN AUTO: 28.3 PG (ref 27–33)
MCH RBC QN AUTO: 29.2 PG (ref 27–33)
MCHC RBC AUTO-ENTMCNC: 32.4 G/DL (ref 33.6–35)
MCHC RBC AUTO-ENTMCNC: 33.1 G/DL (ref 33.6–35)
MCV RBC AUTO: 85.5 FL (ref 81.4–97.8)
MCV RBC AUTO: 90.2 FL (ref 81.4–97.8)
MICROALBUMIN UR-MCNC: 1.2 MG/DL
MICROALBUMIN/CREAT UR: 9 MG/G (ref 0–30)
MONOCYTES # BLD AUTO: 0.27 K/UL (ref 0–0.85)
MONOCYTES # BLD: 0.23 K/UL (ref 0–0.85)
MONOCYTES NFR BLD AUTO: 6.4 % (ref 0–13.4)
MONOCYTES NFR BLD AUTO: 6.9 % (ref 0–13.4)
NEUTROPHILS # BLD AUTO: 2.38 K/UL (ref 2–7.15)
NEUTROPHILS # BLD: 1.97 K/UL (ref 2–7.15)
NEUTROPHILS NFR BLD AUTO: 54.7 % (ref 44–72)
NEUTROPHILS NFR BLD: 61.2 % (ref 44–72)
NITRITE UR QL STRIP.AUTO: NEGATIVE
NRBC # BLD AUTO: 0 K/UL
NRBC # BLD AUTO: 0 K/UL
NRBC BLD AUTO-RTO: 0 /100 WBC
NRBC BLD-RTO: 0 /100 WBC
PH UR STRIP.AUTO: 8.5 [PH]
PHOSPHATE SERPL-MCNC: 4 MG/DL (ref 2.5–4.5)
PLATELET # BLD AUTO: 90 K/UL (ref 164–446)
PLATELET # BLD AUTO: 96 K/UL (ref 164–446)
PMV BLD AUTO: 9.7 FL (ref 9–12.9)
PMV BLD AUTO: 9.8 FL (ref 9–12.9)
POTASSIUM SERPL-SCNC: 4 MMOL/L (ref 3.6–5.5)
POTASSIUM SERPL-SCNC: 4.1 MMOL/L (ref 3.6–5.5)
POTASSIUM SERPL-SCNC: 4.2 MMOL/L (ref 3.6–5.5)
PROT SERPL-MCNC: 6.5 G/DL (ref 6–8.2)
PROT SERPL-MCNC: 6.6 G/DL (ref 6–8.2)
PROT SERPL-MCNC: 6.6 G/DL (ref 6–8.2)
PROT UR QL STRIP: NEGATIVE MG/DL
RBC # BLD AUTO: 3.92 M/UL (ref 4.2–5.4)
RBC # BLD AUTO: 4.07 M/UL (ref 4.2–5.4)
RBC UR QL AUTO: NEGATIVE
SODIUM SERPL-SCNC: 140 MMOL/L (ref 135–145)
SODIUM SERPL-SCNC: 141 MMOL/L (ref 135–145)
SODIUM SERPL-SCNC: 142 MMOL/L (ref 135–145)
SP GR UR STRIP.AUTO: 1.01
TIBC SERPL-MCNC: 339 UG/DL (ref 250–450)
TRANSFERRIN SERPL-MCNC: 242 MG/DL (ref 200–370)
TRIGL SERPL-MCNC: 123 MG/DL (ref 0–149)
VIT B12 SERPL-MCNC: >1500 PG/ML (ref 211–911)
WBC # BLD AUTO: 3.6 K/UL (ref 4.8–10.8)
WBC # BLD AUTO: 3.9 K/UL (ref 4.8–10.8)

## 2017-01-11 PROCEDURE — 99285 EMERGENCY DEPT VISIT HI MDM: CPT

## 2017-01-11 PROCEDURE — 80053 COMPREHEN METABOLIC PANEL: CPT | Mod: 91

## 2017-01-11 PROCEDURE — 83690 ASSAY OF LIPASE: CPT

## 2017-01-11 PROCEDURE — 81002 URINALYSIS NONAUTO W/O SCOPE: CPT

## 2017-01-11 PROCEDURE — 96361 HYDRATE IV INFUSION ADD-ON: CPT

## 2017-01-11 PROCEDURE — 80053 COMPREHEN METABOLIC PANEL: CPT

## 2017-01-11 PROCEDURE — 304561 HCHG STAT O2

## 2017-01-11 PROCEDURE — 700111 HCHG RX REV CODE 636 W/ 250 OVERRIDE (IP): Performed by: EMERGENCY MEDICINE

## 2017-01-11 PROCEDURE — 96374 THER/PROPH/DIAG INJ IV PUSH: CPT

## 2017-01-11 PROCEDURE — 304562 HCHG STAT O2 MASK/CANNULA

## 2017-01-11 PROCEDURE — 94760 N-INVAS EAR/PLS OXIMETRY 1: CPT

## 2017-01-11 PROCEDURE — A9270 NON-COVERED ITEM OR SERVICE: HCPCS | Performed by: EMERGENCY MEDICINE

## 2017-01-11 PROCEDURE — 93005 ELECTROCARDIOGRAM TRACING: CPT

## 2017-01-11 PROCEDURE — 80197 ASSAY OF TACROLIMUS: CPT

## 2017-01-11 PROCEDURE — 302449 STATCHG TRIAGE ONLY (STATISTIC)

## 2017-01-11 PROCEDURE — 82977 ASSAY OF GGT: CPT

## 2017-01-11 PROCEDURE — 96375 TX/PRO/DX INJ NEW DRUG ADDON: CPT

## 2017-01-11 PROCEDURE — 85025 COMPLETE CBC W/AUTO DIFF WBC: CPT

## 2017-01-11 PROCEDURE — 36415 COLL VENOUS BLD VENIPUNCTURE: CPT

## 2017-01-11 PROCEDURE — 96376 TX/PRO/DX INJ SAME DRUG ADON: CPT

## 2017-01-11 PROCEDURE — 700102 HCHG RX REV CODE 250 W/ 637 OVERRIDE(OP): Performed by: EMERGENCY MEDICINE

## 2017-01-11 PROCEDURE — 74000 DX-ABDOMEN-1 VIEW: CPT

## 2017-01-11 PROCEDURE — 83735 ASSAY OF MAGNESIUM: CPT

## 2017-01-11 PROCEDURE — 85025 COMPLETE CBC W/AUTO DIFF WBC: CPT | Mod: 91

## 2017-01-11 RX ORDER — ONDANSETRON 2 MG/ML
4 INJECTION INTRAMUSCULAR; INTRAVENOUS ONCE
Status: COMPLETED | OUTPATIENT
Start: 2017-01-11 | End: 2017-01-11

## 2017-01-11 RX ORDER — AMBRISENTAN 10 MG/1
10 TABLET, FILM COATED ORAL DAILY
Qty: 30 TAB | Refills: 11 | Status: SHIPPED | OUTPATIENT
Start: 2017-01-11 | End: 2017-09-12 | Stop reason: SDUPTHER

## 2017-01-11 RX ORDER — SODIUM CHLORIDE, SODIUM LACTATE, POTASSIUM CHLORIDE, CALCIUM CHLORIDE 600; 310; 30; 20 MG/100ML; MG/100ML; MG/100ML; MG/100ML
1000 INJECTION, SOLUTION INTRAVENOUS ONCE
Status: COMPLETED | OUTPATIENT
Start: 2017-01-11 | End: 2017-01-12

## 2017-01-11 RX ADMIN — ONDANSETRON 4 MG: 2 INJECTION, SOLUTION INTRAMUSCULAR; INTRAVENOUS at 20:39

## 2017-01-11 RX ADMIN — HYDROMORPHONE HYDROCHLORIDE 1 MG: 1 INJECTION, SOLUTION INTRAMUSCULAR; INTRAVENOUS; SUBCUTANEOUS at 22:21

## 2017-01-11 RX ADMIN — HYDROMORPHONE HYDROCHLORIDE 1 MG: 1 INJECTION, SOLUTION INTRAMUSCULAR; INTRAVENOUS; SUBCUTANEOUS at 20:39

## 2017-01-11 RX ADMIN — SODIUM CHLORIDE, POTASSIUM CHLORIDE, SODIUM LACTATE AND CALCIUM CHLORIDE 1000 ML: 600; 310; 30; 20 INJECTION, SOLUTION INTRAVENOUS at 20:39

## 2017-01-11 RX ADMIN — MAGNESIUM CITRATE 296 ML: 1.75 LIQUID ORAL at 23:30

## 2017-01-11 ASSESSMENT — PAIN SCALES - GENERAL
PAINLEVEL_OUTOF10: 9
PAINLEVEL_OUTOF10: 9
PAINLEVEL_OUTOF10: 8
PAINLEVEL_OUTOF10: 6

## 2017-01-11 NOTE — ED AVS SNAPSHOT
1/12/2017          Roxana Cuba  [address]  [place] [address]    Dear Roxana:    [facility] wants to ensure your discharge home is safe and you or your loved ones have had all your questions answered regarding your care after you leave the hospital.    You may receive a telephone call within two days of your discharge.  This call is to make certain you understand your discharge instructions as well as ensure we provided you with the best care possible during your stay with us.     The call will only last approximately 3-5 minutes and will be done by a nurse.    Once again, we want to ensure your discharge home is safe and that you have a clear understanding of any next steps in your care.  If you have any questions or concerns, please do not hesitate to contact us, we are here for you.  Thank you for choosing [facility] for your healthcare needs.    Sincerely,    Fredrick Figueroa    [facility]

## 2017-01-11 NOTE — ED AVS SNAPSHOT
After Visit Summary                                                                                                                Roxana Cuba   MRN: [id number]    Department:  [facility], Emergency Dept   Date of Visit:  1/11/2017            [facility], Emergency Dept    [address]    [place] [address]    Phone:  [phone]      You were seen by     1. Dank Reynoso D.O.    2. Mattie Williamson M.D.      Your Diagnosis Was     Generalized abdominal pain     R10.84       These are the medications you received during your hospitalization from 01/11/2017 1723 to 01/12/2017 0212     Date/Time Order Dose Route Action    01/11/2017 2039 LR infusion (bolus) 1,000 mL Intravenous New Bag    01/11/2017 2039 HYDROmorphone (DILAUDID) injection 1 mg 1 mg Intravenous Given    01/11/2017 2039 ondansetron (ZOFRAN) syringe/vial injection 4 mg 4 mg Intravenous Given    01/11/2017 2221 HYDROmorphone (DILAUDID) injection 1 mg 1 mg Intravenous Given    01/11/2017 2330 magnesium citrate solution 296 mL 296 mL Oral Given    01/12/2017 0200 polyethylene glycol-electrolytes (GOLYTELY) solution 4 L 4 L Oral Given      Follow-up Information     1. Follow up with Bernarda Reyes D.O.. Call in 1 day.    Specialty:  Family Medicine    Contact information    [address]  [address]  [facility] [address] [phone]          2. Follow up with [facility], Emergency Dept.    Specialty:  Emergency Medicine    Why:  If symptoms worsen    Contact information    [address]  [place] [address] [phone]      Medication Information     Review all of your home medications and newly ordered medications with your primary doctor and/or pharmacist as soon as possible. Follow medication instructions as directed by your doctor and/or pharmacist.     Please keep your complete medication list with you and share with your physician. Update the  information when medications are discontinued, doses are changed, or new medications (including over-the-counter products) are added; and carry medication information at all times in the event of emergency situations.               Medication List      ASK your doctor about these medications        Instructions    ADCIRCA 20 MG Tabs   Generic drug:  Tadalafil (PAH)    Take 40 mg by mouth every evening.   Dose:  40 mg       ambrisentan 10 MG tablet   Commonly known as:  LETAIRIS    Take 1 Tab by mouth every day.   Dose:  10 mg       aspirin EC 81 MG Tbec   Commonly known as:  ECOTRIN    Take 81 mg by mouth every morning.   Dose:  81 mg       BARIATRIC FUSION Chew    Take 1 Tab by mouth every evening.   Dose:  1 Tab       CELLCEPT 250 MG Caps   Generic drug:  mycophenolate    Take 500 mg by mouth 2 times a day.   Dose:  500 mg       * CITRACAL + D PO    Take 1 Tab by mouth 2 Times a Day. @@ 0700 and 1800   Dose:  1 Tab       * CITRACAL MAXIMUM 315-250 MG-UNIT Tabs   Generic drug:  Calcium Citrate-Vitamin D    TAKE 2 TABS BY MOUTH 2X/ DAY WITH FOOD BEFORE AND AFTER DINNER FOR LIFE-CRUSH 1ST 3 MONTH, SPACE MVI       cyclobenzaprine 10 MG Tabs   Commonly known as:  FLEXERIL    TAKE 1 TABLET BY MOUTH 3 TIMES A DAY AS NEEDED FOR MUSCLE SPASMS       docusate sodium 100 MG Caps   Commonly known as:  COLACE    Take 100 mg by mouth 2 times a day.   Dose:  100 mg       furosemide 40 MG Tabs   Commonly known as:  LASIX    Take 20 mg by mouth every day.   Dose:  20 mg       gabapentin 600 MG tablet   Commonly known as:  NEURONTIN    Doctor's comments:  Authorization of a refill request.   TAKE 1 TABLET BY MOUTH 3 TIMES A DAY       insulin glargine 100 UNIT/ML Soln   Commonly known as:  LANTUS    Inject 8 Units as instructed every morning.   Dose:  8 Units       levothyroxine 137 MCG Tabs   Commonly known as:  SYNTHROID    Doctor's comments:  This is an authorization for a requested refill   TAKE 1 TABLET BY MOUTH EVERY DAY        NYSTOP 681943 UNIT/GM Powd    Doctor's comments:  Authorization of a refill request.   APPLY 2 TO 3 TIMES A DAY AS NEEDED       omeprazole 40 MG delayed-release capsule   Commonly known as:  PRILOSEC    Take 1 Cap by mouth every day.   Dose:  40 mg       ondansetron 4 MG Tbdp   Commonly known as:  ZOFRAN ODT    Take 1 Tab by mouth every 8 hours as needed. Nausea and vomiting   Dose:  4 mg       oxycodone immediate release 10 MG immediate release tablet   Commonly known as:  ROXICODONE    Take 10 mg by mouth 3 times a day. @0700, 1200, and 2000   Dose:  10 mg       polyethylene glycol 3350 Powd   Commonly known as:  MIRALAX    Take 17 g by mouth every day.   Dose:  17 g       pravastatin 20 MG Tabs   Commonly known as:  PRAVACHOL    Take 1 Tab by mouth every day.   Dose:  20 mg       * PredniSONE 2 MG Tbec    Take 2 mg by mouth every day. Pt has another RX for 5MG Pt takes total of 7MG   Dose:  2 mg       * predniSONE 5 MG Tabs   Commonly known as:  DELTASONE    Doctor's comments:  In addition to prednisone 1 mg prescription   1 to 4 tabs by mouth daily as directed       PROBIOTIC DAILY PO    Take 1 Cap by mouth every evening.   Dose:  1 Cap       sennosides 8.6 MG Tabs   Commonly known as:  SENOKOT    Take 17.2 mg by mouth 2 times a day.   Dose:  17.2 mg       sertraline 100 MG Tabs   Commonly known as:  ZOLOFT    Take 1 Tab by mouth every evening.   Dose:  100 mg       tacrolimus 1 MG Caps   Commonly known as:  PROGRAF    Take 1.5-2 mg by mouth 2 times a day. Pt takes: 2MG in AM 1.5MG HS   Dose:  1.5-2 mg       trazodone 50 MG Tabs   Commonly known as:  DESYREL    Take 1-2 Tabs by mouth every bedtime.   Dose:   mg       * Notice:  This list has 4 medication(s) that are the same as other medications prescribed for you. Read the directions carefully, and ask your doctor or other care provider to review them with you.            Procedures and tests performed during your visit     CBC WITH DIFFERENTIAL    COMP  METABOLIC PANEL    Cardiac Monitoring    VR-[id number] VIEW    EKG (ER)    ESTIMATED GFR    IV Saline Lock    LIPASE    Oxygen    POC UA    Pulse Ox        Discharge Instructions       Abdominal Pain, Adult  Many things can cause abdominal pain. Usually, abdominal pain is not caused by a disease and will improve without treatment. It can often be observed and treated at home. Your health care provider will do a physical exam and possibly order blood tests and X-rays to help determine the seriousness of your pain. However, in many cases, more time must pass before a clear cause of the pain can be found. Before that point, your health care provider may not know if you need more testing or further treatment.  HOME CARE INSTRUCTIONS   Monitor your abdominal pain for any changes. The following actions may help to alleviate any discomfort you are experiencing:  · Only take over-the-counter or prescription medicines as directed by your health care provider.  · Do not take laxatives unless directed to do so by your health care provider.  · Try a clear liquid diet (broth, tea, or water) as directed by your health care provider. Slowly move to a bland diet as tolerated.  SEEK MEDICAL CARE IF:  · You have unexplained abdominal pain.  · You have abdominal pain associated with nausea or diarrhea.  · You have pain when you urinate or have a bowel movement.  · You experience abdominal pain that wakes you in the night.  · You have abdominal pain that is worsened or improved by eating food.  · You have abdominal pain that is worsened with eating fatty foods.  · You have a fever.  SEEK IMMEDIATE MEDICAL CARE IF:   · Your pain does not go away within 2 hours.  · You keep throwing up (vomiting).  · Your pain is felt only in portions of the abdomen, such as the right side or the left lower portion of the abdomen.  · You pass bloody or black tarry stools.  MAKE SURE YOU:  · Understand these instructions.    · Will watch your condition.     · Will get help right away if you are not doing well or get worse.       This information is not intended to replace advice given to you by your health care provider. Make sure you discuss any questions you have with your health care provider.     Document Released: 09/27/2006 Document Revised: 01/08/2016 Document Reviewed: 08/27/2014  [employer] Interactive Patient Education ©2016 [employer]    Constipation, Adult  Constipation is when a person has fewer than three bowel movements a week, has difficulty having a bowel movement, or has stools that are dry, hard, or larger than normal. As people grow older, constipation is more common. A low-fiber diet, not taking in enough fluids, and taking certain medicines may make constipation worse.   CAUSES   · Certain medicines, such as antidepressants, pain medicine, iron supplements, antacids, and water pills.    · Certain diseases, such as diabetes, irritable bowel syndrome (IBS), thyroid disease, or depression.    · Not drinking enough water.    · Not eating enough fiber-rich foods.    · Stress or travel.    · Lack of physical activity or exercise.    · Ignoring the urge to have a bowel movement.    · Using laxatives too much.    SIGNS AND SYMPTOMS   · Having fewer than three bowel movements a week.    · Straining to have a bowel movement.    · Having stools that are hard, dry, or larger than normal.    · Feeling full or bloated.    · Pain in the lower abdomen.    · Not feeling relief after having a bowel movement.    DIAGNOSIS   Your health care provider will take a medical history and perform a physical exam. Further testing may be done for severe constipation. Some tests may include:  · A barium enema X-ray to examine your rectum, colon, and, sometimes, your small intestine.    · A sigmoidoscopy to examine your lower colon.    · A colonoscopy to examine your entire colon.  TREATMENT   Treatment will depend on the severity of your constipation and what is causing  it. Some dietary treatments include drinking more fluids and eating more fiber-rich foods. Lifestyle treatments may include regular exercise. If these diet and lifestyle recommendations do not help, your health care provider may recommend taking over-the-counter laxative medicines to help you have bowel movements. Prescription medicines may be prescribed if over-the-counter medicines do not work.   HOME CARE INSTRUCTIONS   · Eat foods that have a lot of fiber, such as fruits, vegetables, whole grains, and beans.  · Limit foods high in fat and processed sugars, such as french fries, hamburgers, cookies, candies, and soda.    · A fiber supplement may be added to your diet if you cannot get enough fiber from foods.    · Drink enough fluids to keep your urine clear or pale yellow.    · Exercise regularly or as directed by your health care provider.    · Go to the restroom when you have the urge to go. Do not hold it.    · Only take over-the-counter or prescription medicines as directed by your health care provider. Do not take other medicines for constipation without talking to your health care provider first.    SEEK IMMEDIATE MEDICAL CARE IF:   · You have bright red blood in your stool.    · Your constipation lasts for more than 4 days or gets worse.    · You have abdominal or rectal pain.    · You have thin, pencil-like stools.    · You have unexplained weight loss.  MAKE SURE YOU:   · Understand these instructions.  · Will watch your condition.  · Will get help right away if you are not doing well or get worse.     This information is not intended to replace advice given to you by your health care provider. Make sure you discuss any questions you have with your health care provider.     Document Released: 09/15/2005 Document Revised: 01/08/2016 Document Reviewed: 09/29/2014  Elsevier Interactive Patient Education ©2016 [employer]            Patient Information     Patient Information    Following emergency  treatment: all patient requiring follow-up care must return either to a private physician or a clinic if your condition worsens before you are able to obtain further medical attention, please return to the emergency room.     Billing Information    At [facility], we work to make the billing process streamlined for our patients.  Our Representatives are here to answer any questions you may have regarding your hospital bill.  If you have insurance coverage and have supplied your insurance information to us, we will submit a claim to your insurer on your behalf.  Should you have any questions regarding your bill, we can be reached online or by phone as follows:  Online: You are able pay your bills online or live chat with our representatives about any billing questions you may have. We are here to help Monday - Friday from 8:00am to 7:30pm and 9:00am - 12:00pm on Saturdays.  Please visit [email or web address]  for more information.   Phone:  [phone] or [phone]    Please note that your emergency physician, surgeon, pathologist, radiologist, anesthesiologist, and other specialists are not employed by [facility] and will therefore bill separately for their services.  Please contact them directly for any questions concerning their bills at the numbers below:     Emergency Physician Services:  [phone]  [facility]:  [phone]  [facility]:  [phone]  [facility]:  [phone]    1. Your final bill may vary from the amount quoted upon discharge if all procedures are not complete at that time, or if your doctor has additional procedures of which we are not aware. You will receive an additional bill if you return to the Emergency Department at [facility] for suture removal regardless of the facility of which the sutures were placed.     2. Please arrange for settlement of this account at the emergency  registration.    3. All self-pay accounts are due in full at the time of treatment.  If you are unable to meet this obligation then payment is expected within 4-5 days.     4. If you have had radiology studies (CT, X-ray, Ultrasound, MRI), you have received a preliminary result during your emergency department visit. Please contact the radiology department [phone] to receive a copy of your final result. Please discuss the Final result with your primary physician or with the follow up physician provided.     Crisis Hotline:  [place] Crisis Hotline:  3-993-TVGCCOL or 1-802.843.2457  [place] Crisis Hotline:    1-740.247.9162 or 941-960-0935         ED Discharge Follow Up Questions    1. In order to provide you with very good care, we would like to follow up with a phone call in the next few days.  May we have your permission to contact you?     YES /  NO    2. What is the best phone number to call you? (       )_____-__________    3. What is the best time to call you?      Morning  /  Afternoon  /  Evening                   Patient Signature:  ____________________________________________________________    Date:  ____________________________________________________________      Your appointments     Jan 16, 2017 10:15 AM   ECHO with ECHO [facility]   ECHOCARDIOLOGY [facility])    [address]  [place] [address]   [phone]           No prep            Jan 18, 2017  8:30 AM   MA DX30 with ED PAREDES MG 1   [facility] IMAGING [facility]    [address]  [place] [address]   [phone]            Jan 20, 2017  1:20 PM   Access New To You with Bernarda Reyes D.O.   [facility])    [address], [address]  [place] [address]   [phone]            Feb 15, 2017  8:00 AM   Adult Draw/Collection with LAB [facility]   [facility] (--)    [address]  [place] [address]   [phone]            Mar 08, 2017  8:00 AM    Adult Draw/Collection with LAB [facility]   [facility] (--)    [address]  [place] [address]   [phone]            Apr 03, 2017 10:30 AM   FOLLOW UP with Maxwell Phan M.D.   [facility] (--)    [address] [place] [address] [address]  [place] [address]   [phone]

## 2017-01-11 NOTE — ED AVS SNAPSHOT
PeerMe Access Code: Activation code not generated  Current PeerMe Status: Active    [employer]  A secure, online tool to manage your health information     [employer] PeerMe® is a secure, online tool that connects you to your personalized health information from the privacy of your home -- day or night - making it very easy for you to manage your healthcare. Once the activation process is completed, you can even access your medical information using the PeerMe kacy, which is available for free in the Apple Kacy store or Google Play store.     PeerMe provides the following levels of access (as shown below):   My Chart Features   [facility] Primary Care Doctor [facility]  Specialists [facility]  Urgent  Care Non-[facility]  Primary Care  Doctor   Email your healthcare team securely and privately 24/7 X X X X   Manage appointments: schedule your next appointment; view details of past/upcoming appointments X      Request prescription refills. X      View recent personal medical records, including lab and immunizations X X X X   View health record, including health history, allergies, medications X X X X   Read reports about your outpatient visits, procedures, consult and ER notes X X X X   See your discharge summary, which is a recap of your hospital and/or ER visit that includes your diagnosis, lab results, and care plan. X X       How to register for PeerMe:  1. Go to  [email or web address].  2. Click on the Sign Up Now box, which takes you to the New Member Sign Up page. You will need to provide the following information:  a. Enter your PeerMe Access Code exactly as it appears at the top of this page. (You will not need to use this code after you’ve completed the sign-up process. If you do not sign up before the expiration date, you must request a new code.)   b. Enter your date of birth.   c. Enter your home email address.   d. Click Submit, and follow the next screen’s instructions.  3. Create a PeerMe ID. This will  be your [facility] login ID and cannot be changed, so think of one that is secure and easy to remember.  4. Create a [facility] password. You can change your password at any time.  5. Enter your Password Reset Question and Answer. This can be used at a later time if you forget your password.   6. Enter your e-mail address. This allows you to receive e-mail notifications when new information is available in [facility].  7. Click Sign Up. You can now view your health information.    For assistance activating your [employer] [facility] account, call [phone]

## 2017-01-12 VITALS
HEIGHT: 68 IN | SYSTOLIC BLOOD PRESSURE: 106 MMHG | WEIGHT: 175.71 LBS | HEART RATE: 75 BPM | OXYGEN SATURATION: 100 % | DIASTOLIC BLOOD PRESSURE: 71 MMHG | RESPIRATION RATE: 16 BRPM | BODY MASS INDEX: 26.63 KG/M2 | TEMPERATURE: 98.7 F

## 2017-01-12 PROCEDURE — 700101 HCHG RX REV CODE 250: Performed by: EMERGENCY MEDICINE

## 2017-01-12 PROCEDURE — 96361 HYDRATE IV INFUSION ADD-ON: CPT

## 2017-01-12 RX ADMIN — POLYETHYLENE GLYCOL 3350, SODIUM SULFATE ANHYDROUS, SODIUM BICARBONATE, SODIUM CHLORIDE, POTASSIUM CHLORIDE 4 L: 236; 22.74; 6.74; 5.86; 2.97 POWDER, FOR SOLUTION ORAL at 02:00

## 2017-01-12 NOTE — DISCHARGE INSTRUCTIONS
Abdominal Pain, Adult  Many things can cause abdominal pain. Usually, abdominal pain is not caused by a disease and will improve without treatment. It can often be observed and treated at home. Your health care provider will do a physical exam and possibly order blood tests and X-rays to help determine the seriousness of your pain. However, in many cases, more time must pass before a clear cause of the pain can be found. Before that point, your health care provider may not know if you need more testing or further treatment.  HOME CARE INSTRUCTIONS   Monitor your abdominal pain for any changes. The following actions may help to alleviate any discomfort you are experiencing:  · Only take over-the-counter or prescription medicines as directed by your health care provider.  · Do not take laxatives unless directed to do so by your health care provider.  · Try a clear liquid diet (broth, tea, or water) as directed by your health care provider. Slowly move to a bland diet as tolerated.  SEEK MEDICAL CARE IF:  · You have unexplained abdominal pain.  · You have abdominal pain associated with nausea or diarrhea.  · You have pain when you urinate or have a bowel movement.  · You experience abdominal pain that wakes you in the night.  · You have abdominal pain that is worsened or improved by eating food.  · You have abdominal pain that is worsened with eating fatty foods.  · You have a fever.  SEEK IMMEDIATE MEDICAL CARE IF:   · Your pain does not go away within 2 hours.  · You keep throwing up (vomiting).  · Your pain is felt only in portions of the abdomen, such as the right side or the left lower portion of the abdomen.  · You pass bloody or black tarry stools.  MAKE SURE YOU:  · Understand these instructions.    · Will watch your condition.    · Will get help right away if you are not doing well or get worse.       This information is not intended to replace advice given to you by your health care provider. Make sure you  discuss any questions you have with your health care provider.     Document Released: 09/27/2006 Document Revised: 01/08/2016 Document Reviewed: 08/27/2014  [employer] Interactive Patient Education ©2016 [employer]    Constipation, Adult  Constipation is when a person has fewer than three bowel movements a week, has difficulty having a bowel movement, or has stools that are dry, hard, or larger than normal. As people grow older, constipation is more common. A low-fiber diet, not taking in enough fluids, and taking certain medicines may make constipation worse.   CAUSES   · Certain medicines, such as antidepressants, pain medicine, iron supplements, antacids, and water pills.    · Certain diseases, such as diabetes, irritable bowel syndrome (IBS), thyroid disease, or depression.    · Not drinking enough water.    · Not eating enough fiber-rich foods.    · Stress or travel.    · Lack of physical activity or exercise.    · Ignoring the urge to have a bowel movement.    · Using laxatives too much.    SIGNS AND SYMPTOMS   · Having fewer than three bowel movements a week.    · Straining to have a bowel movement.    · Having stools that are hard, dry, or larger than normal.    · Feeling full or bloated.    · Pain in the lower abdomen.    · Not feeling relief after having a bowel movement.    DIAGNOSIS   Your health care provider will take a medical history and perform a physical exam. Further testing may be done for severe constipation. Some tests may include:  · A barium enema X-ray to examine your rectum, colon, and, sometimes, your small intestine.    · A sigmoidoscopy to examine your lower colon.    · A colonoscopy to examine your entire colon.  TREATMENT   Treatment will depend on the severity of your constipation and what is causing it. Some dietary treatments include drinking more fluids and eating more fiber-rich foods. Lifestyle treatments may include regular exercise. If these diet and lifestyle recommendations  do not help, your health care provider may recommend taking over-the-counter laxative medicines to help you have bowel movements. Prescription medicines may be prescribed if over-the-counter medicines do not work.   HOME CARE INSTRUCTIONS   · Eat foods that have a lot of fiber, such as fruits, vegetables, whole grains, and beans.  · Limit foods high in fat and processed sugars, such as french fries, hamburgers, cookies, candies, and soda.    · A fiber supplement may be added to your diet if you cannot get enough fiber from foods.    · Drink enough fluids to keep your urine clear or pale yellow.    · Exercise regularly or as directed by your health care provider.    · Go to the restroom when you have the urge to go. Do not hold it.    · Only take over-the-counter or prescription medicines as directed by your health care provider. Do not take other medicines for constipation without talking to your health care provider first.    SEEK IMMEDIATE MEDICAL CARE IF:   · You have bright red blood in your stool.    · Your constipation lasts for more than 4 days or gets worse.    · You have abdominal or rectal pain.    · You have thin, pencil-like stools.    · You have unexplained weight loss.  MAKE SURE YOU:   · Understand these instructions.  · Will watch your condition.  · Will get help right away if you are not doing well or get worse.     This information is not intended to replace advice given to you by your health care provider. Make sure you discuss any questions you have with your health care provider.     Document Released: 09/15/2005 Document Revised: 01/08/2016 Document Reviewed: 09/29/2014  [employer] Interactive Patient Education ©2016 [employer]

## 2017-01-12 NOTE — ED PROVIDER NOTES
ED Provider Note    CHIEF COMPLAINT  Chief Complaint   Patient presents with   • Abdominal Pain     Discharge 1 week ago s/o SBO, c/o constant abd pain   • Constipation     x 1 week        HPI    Primary care provider: Bernarda Reyes D.O.   Means of arrival: Walk-in  History obtained from: Patient and her   History limited by: None     Roxana Cuba is a 56 y.o. female who presents to the ED complaining of diffuse abdominal pain and constipation for about a week. Patient was recently discharged from hospitals for small bowel obstruction and reports that she has not had a bowel movement since despite trying multiple medications. Patient reports that her abdomen feels very bloated with pain mostly in the mid to lower abdomen. Denies pain anywhere else. Denies any fever. Reports nausea but no vomiting. Denies any dysuria. She states that she has a history of liver transplant and was told by her hepatologist to come to the ED.    REVIEW OF SYSTEMS  Please see HPI for pertinent positives/negatives.  All other systems reviewed and are negative.     PAST MEDICAL HISTORY   has a past medical history of Cirrhosis (HCC) (December 2011); S/P cholecystectomy; GERD (gastroesophageal reflux disease); Psychiatric disorder; Fracture of left foot; Bronchitis ( ); CKD (chronic kidney disease) stage 3, GFR 30-59 ml/min; Breath shortness; Chronic fatigue and malaise; VRE (vancomycin-resistant Enterococci); Low back pain ( ); Pneumonia; Mild aortic regurgitation and aortic valve sclerosis ( ); Splenomegaly (7/14/2015); Small bowel obstruction (HCC); and On home oxygen therapy.     SURGICAL HISTORY  Past Surgical History   Procedure Laterality Date   • Pr anesth,nose,sinus surgery     • Latricia by laparoscopy  9/19/2009     Performed by BIRD ABDI at SURGERY [facility] ORS   • Exam under anesthesia  11/11/2009     Performed by BIRD ABDI at SURGERY [facility] ORS   • Hysterectomy, total abdominal           • Other        Breast reduction   • Gastroscopy-endo  3/12/2010     Performed by CAMELIA SAMANO at ENDOSCOPY [facility]   • Gastric banding laparoscopic     • Bronchoscopy-endo  5/29/2012     Performed by SUYAPA CAMARENA at ENDOSCOPY [facility]   • Bronchoscopy-endo  6/19/2012     Performed by MARLENA MURILLO at ENDOSCOPY [facility]   • Sigmoidoscopy flex  9/26/2012     Performed by Kristopher Cardoso M.D. at ENDOSCOPY [facility]   • Recovery  2/27/2013     Performed by [facility] at SURGERY SAME DAY [facility]   • Bronchoscopy-endo  11/15/2013     Performed by Ha Perez M.D. at ENDOSCOPY [facility]   • Recovery  1/21/2014     Performed by [facility] at SURGERY SAME DAY [facility] ORS   • Recovery  3/24/2014     Performed by [facility] at SURGERY SAME DAY [facility] ORS   • Hemorrhoidectomy  11/11/2009     Performed by BIRD ABDI at SURGERY [facility]   • Gastroscopy  9/28/2012     Performed by Aravind Richards M.D. at SURGERY [facility]   • Carpal tunnel release           • Bone marrow aspiration  12/31/2012     Performed by Josemanuel Real M.D. at ENDOSCOPY [facility]   • Bone marrow biopsy, ndl/trocar  12/31/2012     Performed by Josemanuel Real M.D. at ENDOSCOPY [facility]   • Recovery  3/31/2014     Performed by [facility] at SURGERY [facility]   • Other abdominal surgery  December 2011     Liver transplant at [facility] by Dr. Canada.   • Bone marrow aspiration  3/16/2015     Performed by Marlena Hi M.D. at ENDOSCOPY [facility]   • Bone marrow biopsy, ndl/trocar  3/16/2015     Performed by Marlena Hi M.D. at ENDOSCOPY [facility]   • Lung biopsy open     • Tonsillectomy     • Cholecystectomy          SOCIAL HISTORY  Social History     Social History Main Topics   • Smoking status: Passive Smoke Exposure - Never Smoker   • Smokeless tobacco: Never Used      Comment: avoid all tobacco products   •  "Alcohol Use: No      Comment: 05/2009 quit drinking   • Drug Use: No   • Sexual Activity: Not on file        FAMILY HISTORY  Family History   Problem Relation Age of Onset   • Other Father      Unknown (dead 10 years)   • Diabetes Father    • Heart Disease Father    • Hypertension Father    • Hyperlipidemia Father    • Stroke Father    • Non-contributory Mother    • Hyperlipidemia Mother    • Alcohol/Drug Mother    • Cancer Paternal Aunt    • Alcohol/Drug Maternal Grandmother    • Alcohol/Drug Maternal Grandfather         CURRENT MEDICATIONS  Home Medications     **Home medications have not yet been reviewed for this encounter**           ALLERGIES  Allergies   Allergen Reactions   • Rhubarb Anaphylaxis   • Vancomycin Hcl      Causes red man syndrome when infused to fast          PHYSICAL EXAM  VITAL SIGNS: /71 mmHg  Pulse 63  Temp(Src) 37.1 °C (98.7 °F)  Resp 16  Ht 1.727 m (5' 8\")  Wt 79.7 kg (175 lb 11.3 oz)  BMI 26.72 kg/m2  SpO2 96%  LMP 01/03/2000 @CHRISTINA[[id number]::@   Pulse ox interpretation: 94% I interpret this pulse ox as normal.     Constitutional: Well developed, well nourished, alert in no apparent distress, nontoxic appearance   HENT: No external signs of trauma, normocephalic, bilateral external ears normal, oropharynx moist and clear, nose normal   Eyes: PERRL, conjunctiva without erythema, no discharge, no icterus   Neck: Soft and supple, trachea midline, no stridor, no tenderness, no LAD, no JVD, good ROM   Cardiovascular: Regular rate and rhythm, no murmurs/rubs/gallops, strong distal pulses and good perfusion   Thorax & Lungs: No respiratory distress, CTAB, no chest tenderness   Abdomen: Soft, mild mid to lower tenderness palpation, no G/R, normal BS, no gross distention  Back: No CVAT   Extremities: No clubbing, no cyanosis, no edema, no gross deformity, good ROM, no tenderness, intact distal pulses with brisk cap refill   Skin: Warm, dry, no pallor/cyanosis, no rash noted   Lymphatic: " No lymphadenopathy noted   Neurologic: A/O times 3, no focal deficits noted   Psychiatric: Cooperative, normal mood and affect, normal judgement, appropriate for clinical situation       DIAGNOSTIC STUDIES / PROCEDURES    EKG  12 Lead EKG obtained at 5:21 PM and interpreted by me:   Rate: 61  Rhythm: Sinus rhythm with sinus arrhythmia  Ectopy: None  Intervals: Normal   Axis: Normal   Q Waves: None   QRS: Normal   ST segments: Normal  T Waves: Normal     Clinical Impression: No acute ischemic changes    LABS  All labs reviewed by me.     Results for orders placed or performed during the hospital encounter of 01/11/17   CBC WITH DIFFERENTIAL   Result Value Ref Range    WBC 3.9 (L) 4.8 - 10.8 K/uL    RBC 3.92 (L) 4.20 - 5.40 M/uL    Hemoglobin 11.1 (L) 12.0 - 16.0 g/dL    Hematocrit 33.5 (L) 37.0 - 47.0 %    MCV 85.5 81.4 - 97.8 fL    MCH 28.3 27.0 - 33.0 pg    MCHC 33.1 (L) 33.6 - 35.0 g/dL    RDW 46.5 35.9 - 50.0 fL    Platelet Count 96 (L) 164 - 446 K/uL    MPV 9.8 9.0 - 12.9 fL    Neutrophils-Polys 61.20 44.00 - 72.00 %    Lymphocytes 28.50 22.00 - 41.00 %    Monocytes 6.90 0.00 - 13.40 %    Eosinophils 2.60 0.00 - 6.90 %    Basophils 0.50 0.00 - 1.80 %    Immature Granulocytes 0.30 0.00 - 0.90 %    Nucleated RBC 0.00 /100 WBC    Neutrophils (Absolute) 2.38 2.00 - 7.15 K/uL    Lymphs (Absolute) 1.11 1.00 - 4.80 K/uL    Monos (Absolute) 0.27 0.00 - 0.85 K/uL    Eos (Absolute) 0.10 0.00 - 0.51 K/uL    Baso (Absolute) 0.02 0.00 - 0.12 K/uL    Immature Granulocytes (abs) 0.01 0.00 - 0.11 K/uL    NRBC (Absolute) 0.00 K/uL   COMP METABOLIC PANEL   Result Value Ref Range    Sodium 140 135 - 145 mmol/L    Potassium 4.2 3.6 - 5.5 mmol/L    Chloride 101 96 - 112 mmol/L    Co2 31 20 - 33 mmol/L    Anion Gap 8.0 0.0 - 11.9    Glucose 113 (H) 65 - 99 mg/dL    Bun 16 8 - 22 mg/dL    Creatinine 1.14 0.50 - 1.40 mg/dL    Calcium 9.3 8.4 - 10.2 mg/dL    AST(SGOT) 29 12 - 45 U/L    Alkaline Phosphatase 29 (L) 30 - 99 U/L    Total  Bilirubin 0.5 0.1 - 1.5 mg/dL    Albumin 4.2 3.2 - 4.9 g/dL    Total Protein 6.5 6.0 - 8.2 g/dL    Globulin 2.3 1.9 - 3.5 g/dL    A-G Ratio 1.8 g/dL    ALT(SGPT) 18 2 - 50 U/L   LIPASE   Result Value Ref Range    Lipase 25 7 - 58 U/L   ESTIMATED GFR   Result Value Ref Range    GFR If  59 (A) >60 mL/min/1.73 m 2    GFR If Non African American 49 (A) >60 mL/min/1.73 m 2   POC UA   Result Value Ref Range    POC Color Yellow     POC Appearance Clear     POC Glucose Negative Negative mg/dL    POC Ketones Negative Negative mg/dL    POC Specific Gravity 1.015 1.005-1.030    POC Blood Negative Negative    POC Urine PH 8.5 (A) 5.0-8.0    POC Protein Negative Negative mg/dL    POC Nitrites Negative Negative    POC Leukocyte Esterase Negative Negative   EKG (ER)   Result Value Ref Range    Report       [facility] Emergency [facility].    Test Date:  2017  Pt Name:    VICK LI             Department: [facility]  MRN:        [id number]                      Room:  Gender:     F                            Technician: [facility]  :        1960                   Requested By:ER TRIAGE PROTOCOL  Order #:    [id number]                    Reading MD:    Measurements  Intervals                                Axis  Rate:       61                           P:          31  IN:         152                          QRS:        17  QRSD:       86                           T:          50  QT:         434  QTc:        438    Interpretive Statements  Sinus rhythm  Compared to ECG 2016 01:56:40  No significant changes       *Note: Due to a large number of results and/or encounters for the requested time period, some results have not been displayed. A complete set of results can be found in Results Review.        RADIOLOGY  The radiologist's interpretation of all radiological studies have been reviewed by me.     [id number] VIEW   Final Result         1.  Moderate stool in the colon favors changes  of constipation, otherwise nonspecific bowel gas pattern.   2.  Blunting of the left costophrenic angle suggests left effusion or pleural thickening.   3.  Pelvic phleboliths             COURSE & MEDICAL DECISION MAKING  Nursing notes, VS, PMSFHx reviewed in chart.     2215: pt's hepatologist paged    2225: D/W Dr. Thompson, pt's hepatologist.  She recommends enema or intervention to help with her constipation.    Review of past medical records shows the patient's lab results today are stable compared to recent results.     Differential diagnoses considered include but are not limited to: Appendicitis, AMI, AAA, bowel perforation/obstruction, ileus, cholecystitis/ascending cholangitis, gallstone/biliary colic, diverticulitis, mesenteric ischemia, pancreatitis, PUD, gastritis, GERD, KS/renal colic, UTI/pyelo, gastroenteritis, colitis, constipation, muscle strain, hernia, pregnancy/ectopic, PID, endometriosis, ovarian cyst/torsion     Patient presents to the ED complaining of abdominal pain, bloating, and constipation for about a week. Patient requested pain medicine for her discomfort and received Dilaudid ×2. Labs were ordered as well as KUB. Labs were fairly unremarkable compared to recent results. KUB did not show apparent signs of obstruction. Patient was treated for constipation with enema and moderate amount of stools were obtained. Patient subsequently requested back citrate to try and clear everything out. However no further stools were obtained after the medics citrate. I discussed with the patient sending her home with GoLYTELY and patient is agreeable. Patient currently is smiling, in no acute distress, nontoxic in appearance, with no signs of acute abdomen on recheck. I believe her symptoms are most likely due to constipation and may actually be exacerbated by her chronic opioid use. Do not suspect acute serious abdominal pathology at this time. Patient states that she does take stool softeners, fiber, MiraLAX  on a daily basis. I encouraged her to continue with her regimen and to follow up with her doctors.    Findings/results were discussed with patient.  Discussed with patient worrisome S/S and return precautions.  Patient agrees with plan of care with no further questions/concerns.     The patient is referred to a primary physician for blood pressure management, diabetic screening, and for all other preventative health concerns.       FINAL IMPRESSION  1. Generalized abdominal pain    2. Constipation, unspecified constipation type           DISPOSITION  Patient will be discharged home in stable condition.       FOLLOW UP  Bernarda Reyes D.O.  [address]  [address]  [facility] [place] [id number] [phone]    Call in 1 day      [facility], Emergency Dept  [address] [facility] [address] [facility] [address]  [phone]    If symptoms worsen         OUTPATIENT MEDICATIONS  New Prescriptions    No medications on file          Electronically signed by: Dank Reynoso, 1/11/2017 9:49 PM

## 2017-01-12 NOTE — ED NOTES
"Requesting more pain medication.  States a \"strong tolerance\" to pain medication.  Dr. Reynoso informed.  Report to PENNY Alonso.   "

## 2017-01-12 NOTE — ED NOTES
Pt states was here last week for bowel obstruction, states no bm x 1 week, difficulty eating. Hx of dehydration, bradycardia.

## 2017-01-12 NOTE — ED NOTES
"MOM enema given and held by pt for 10 minutes  OOB and liquid stool w/ MOM noted in BSC   Pt continues to c/o \"it's not enough\"  will inform MD   "

## 2017-01-12 NOTE — ED NOTES
Blood drawn and to lab.  Medicated as ordered.  IVF infusing.  Sig other at BS and call light in reach.

## 2017-01-12 NOTE — ED NOTES
Discharged in good condition after discharge instructions reviewed with pt in detail, pt verbalizes understanding of all. Ambulated out with steady gait accompanied by  with follow up instructions in hand. Instructed pt to not drive while taking pain medications, pt states understanding.

## 2017-01-12 NOTE — ED NOTES
"Chief Complaint   Patient presents with   • Abdominal Pain     Discharge 1 week ago s/o SBO, c/o constant abd pain   • Constipation     x 1 week     /54 mmHg  Pulse 68  Temp(Src) 37.1 °C (98.7 °F)  Resp 20  Ht 1.727 m (5' 8\")  Wt 79.7 kg (175 lb 11.3 oz)  BMI 26.72 kg/m2  SpO2 95%  LMP 01/03/2000    "

## 2017-01-13 LAB
LDLC SERPL-MCNC: 84 MG/DL (ref 0–129)
MISCELLANEOUS LAB RESULT MISCLAB: ABNORMAL
TACROLIMUS BLD-MCNC: 2.5 NG/ML

## 2017-01-14 ENCOUNTER — HOSPITAL ENCOUNTER (EMERGENCY)
Facility: MEDICAL CENTER | Age: 57
DRG: 178 | End: 2017-01-14
Attending: EMERGENCY MEDICINE
Payer: MEDICARE

## 2017-01-14 ENCOUNTER — APPOINTMENT (OUTPATIENT)
Dept: RADIOLOGY | Facility: MEDICAL CENTER | Age: 57
DRG: 178 | End: 2017-01-14
Attending: EMERGENCY MEDICINE
Payer: MEDICARE

## 2017-01-14 VITALS
HEIGHT: 68 IN | HEART RATE: 60 BPM | RESPIRATION RATE: 20 BRPM | DIASTOLIC BLOOD PRESSURE: 58 MMHG | SYSTOLIC BLOOD PRESSURE: 99 MMHG | BODY MASS INDEX: 26.22 KG/M2 | WEIGHT: 173 LBS | OXYGEN SATURATION: 90 % | TEMPERATURE: 98.8 F

## 2017-01-14 DIAGNOSIS — R10.13 EPIGASTRIC PAIN: ICD-10-CM

## 2017-01-14 DIAGNOSIS — K59.00 CONSTIPATION, UNSPECIFIED CONSTIPATION TYPE: ICD-10-CM

## 2017-01-14 LAB
ALBUMIN SERPL BCP-MCNC: 4 G/DL (ref 3.2–4.9)
ALBUMIN/GLOB SERPL: 2.1 G/DL
ALP SERPL-CCNC: 31 U/L (ref 30–99)
ALT SERPL-CCNC: 10 U/L (ref 2–50)
ANION GAP SERPL CALC-SCNC: 4 MMOL/L (ref 0–11.9)
APPEARANCE UR: CLEAR
AST SERPL-CCNC: 16 U/L (ref 12–45)
BILIRUB SERPL-MCNC: 0.2 MG/DL (ref 0.1–1.5)
BILIRUB UR QL STRIP.AUTO: NEGATIVE
BUN SERPL-MCNC: 15 MG/DL (ref 8–22)
CALCIUM SERPL-MCNC: 9.2 MG/DL (ref 8.5–10.5)
CHLORIDE SERPL-SCNC: 107 MMOL/L (ref 96–112)
CO2 SERPL-SCNC: 28 MMOL/L (ref 20–33)
COLOR UR: NORMAL
CREAT SERPL-MCNC: 1.16 MG/DL (ref 0.5–1.4)
CULTURE IF INDICATED INDCX: NO UA CULTURE
ERYTHROCYTE [DISTWIDTH] IN BLOOD BY AUTOMATED COUNT: 47.1 FL (ref 35.9–50)
GFR SERPL CREATININE-BSD FRML MDRD: 48 ML/MIN/1.73 M 2
GLOBULIN SER CALC-MCNC: 1.9 G/DL (ref 1.9–3.5)
GLUCOSE SERPL-MCNC: 221 MG/DL (ref 65–99)
GLUCOSE UR STRIP.AUTO-MCNC: NEGATIVE MG/DL
HCT VFR BLD AUTO: 35.4 % (ref 37–47)
HGB BLD-MCNC: 11.3 G/DL (ref 12–16)
KETONES UR STRIP.AUTO-MCNC: NEGATIVE MG/DL
LEUKOCYTE ESTERASE UR QL STRIP.AUTO: NEGATIVE
MCH RBC QN AUTO: 28.2 PG (ref 27–33)
MCHC RBC AUTO-ENTMCNC: 31.9 G/DL (ref 33.6–35)
MCV RBC AUTO: 88.3 FL (ref 81.4–97.8)
MICRO URNS: NORMAL
NITRITE UR QL STRIP.AUTO: NEGATIVE
PH UR STRIP.AUTO: 7.5 [PH]
PLATELET # BLD AUTO: 89 K/UL (ref 164–446)
PMV BLD AUTO: 9 FL (ref 9–12.9)
POTASSIUM SERPL-SCNC: 4.2 MMOL/L (ref 3.6–5.5)
PROT SERPL-MCNC: 5.9 G/DL (ref 6–8.2)
PROT UR QL STRIP: NEGATIVE MG/DL
RBC # BLD AUTO: 4.01 M/UL (ref 4.2–5.4)
RBC UR QL AUTO: NEGATIVE
SODIUM SERPL-SCNC: 139 MMOL/L (ref 135–145)
SP GR UR STRIP.AUTO: 1.02
WBC # BLD AUTO: 3.5 K/UL (ref 4.8–10.8)

## 2017-01-14 RX ORDER — HYDROMORPHONE HYDROCHLORIDE 4 MG/1
8 TABLET ORAL 3 TIMES DAILY PRN
COMMUNITY
End: 2017-02-17

## 2017-01-14 RX ORDER — ONDANSETRON 2 MG/ML
4 INJECTION INTRAMUSCULAR; INTRAVENOUS ONCE
Status: COMPLETED | OUTPATIENT
Start: 2017-01-14 | End: 2017-01-14

## 2017-01-14 RX ORDER — MORPHINE SULFATE 4 MG/ML
4 INJECTION, SOLUTION INTRAMUSCULAR; INTRAVENOUS ONCE
Status: COMPLETED | OUTPATIENT
Start: 2017-01-14 | End: 2017-01-14

## 2017-01-14 RX ORDER — SODIUM CHLORIDE 9 MG/ML
1000 INJECTION, SOLUTION INTRAVENOUS ONCE
Status: COMPLETED | OUTPATIENT
Start: 2017-01-14 | End: 2017-01-14

## 2017-01-14 RX ORDER — ALPRAZOLAM 0.5 MG/1
0.5 TABLET ORAL 3 TIMES DAILY
COMMUNITY
End: 2017-01-20 | Stop reason: SDUPTHER

## 2017-01-14 RX ADMIN — SODIUM CHLORIDE 1000 ML: 9 INJECTION, SOLUTION INTRAVENOUS at 19:44

## 2017-01-14 RX ADMIN — MORPHINE SULFATE 4 MG: 4 INJECTION INTRAVENOUS at 20:19

## 2017-01-14 RX ADMIN — ONDANSETRON 4 MG: 2 INJECTION, SOLUTION INTRAMUSCULAR; INTRAVENOUS at 20:19

## 2017-01-14 RX ADMIN — IOHEXOL 50 ML: 240 INJECTION, SOLUTION INTRATHECAL; INTRAVASCULAR; INTRAVENOUS; ORAL at 21:03

## 2017-01-14 RX ADMIN — METHYLNALTREXONE BROMIDE 8 MG: 12 INJECTION, SOLUTION SUBCUTANEOUS at 22:23

## 2017-01-14 RX ADMIN — IOHEXOL 100 ML: 350 INJECTION, SOLUTION INTRAVENOUS at 21:04

## 2017-01-14 RX ADMIN — MORPHINE SULFATE 4 MG: 4 INJECTION INTRAVENOUS at 22:15

## 2017-01-14 ASSESSMENT — PAIN SCALES - GENERAL: PAINLEVEL_OUTOF10: 7

## 2017-01-14 NOTE — ED AVS SNAPSHOT
After Visit Summary                                                                                                                Roxana Cuba   MRN: [id number]    Department:  [facility], Emergency Dept   Date of Visit:  1/14/2017            [facility], Emergency Dept    [address] [place] [address]    Phone:  [phone]      You were seen by     Elisa Lugo M.D.      Your Diagnosis Was     Epigastric pain     R10.13       These are the medications you received during your hospitalization from 01/14/2017 1905 to 01/14/2017 2242     Date/Time Order Dose Route Action    01/14/2017 1944 NS infusion 1,000 mL 1,000 mL Intravenous New Bag    01/14/2017 2019 morphine (pf) 4 mg/ml injection 4 mg 4 mg Intravenous Given    01/14/2017 2019 ondansetron (ZOFRAN) syringe/vial injection 4 mg 4 mg Intravenous Given    01/14/2017 2104 iohexol (OMNIPAQUE) 350 mg/mL 100 mL Intravenous Given    01/14/2017 2103 iohexol (OMNIPAQUE) 240 mg/mL 50 mL Oral Given    01/14/2017 2215 morphine (pf) 4 mg/ml injection 4 mg 4 mg Intravenous Given    01/14/2017 2223 methylnaltrexone (RELISTOR) injection 8 mg 8 mg Subcutaneous Given      Follow-up Information     1. Follow up with Osman Matt M.D..    Specialty:  Gastroenterology    Why:  as scheduled.    Contact information    [address]  [facility] [address] [phone]          2. Follow up with [facility], Emergency Dept.    Specialty:  Emergency Medicine    Why:  Return for worsening pain, vomiting or other concerns    Contact information    [address] [facility] [address] [phone]      Medication Information     Review all of your home medications and newly ordered medications with your primary doctor and/or pharmacist as soon as possible. Follow medication instructions as directed by your doctor and/or pharmacist.     Please keep your complete medication list with you and share with your  physician. Update the information when medications are discontinued, doses are changed, or new medications (including over-the-counter products) are added; and carry medication information at all times in the event of emergency situations.               Medication List      ASK your doctor about these medications        Instructions    ADCIRCA 20 MG Tabs   Generic drug:  Tadalafil (PAH)    Take 40 mg by mouth every evening.   Dose:  40 mg       alprazolam 0.5 MG Tabs   Commonly known as:  XANAX    Take 0.5 mg by mouth at bedtime as needed for Sleep.   Dose:  0.5 mg       ambrisentan 10 MG tablet   Commonly known as:  LETAIRIS    Take 1 Tab by mouth every day.   Dose:  10 mg       aspirin EC 81 MG Tbec   Commonly known as:  ECOTRIN    Take 81 mg by mouth every morning.   Dose:  81 mg       BARIATRIC FUSION Chew    Take 1 Tab by mouth every evening.   Dose:  1 Tab       CELLCEPT 250 MG Caps   Generic drug:  mycophenolate    Take 500 mg by mouth 2 times a day.   Dose:  500 mg       * CITRACAL + D PO    Take 1 Tab by mouth 2 Times a Day. @@ 0700 and 1800   Dose:  1 Tab       * CITRACAL MAXIMUM 315-250 MG-UNIT Tabs   Generic drug:  Calcium Citrate-Vitamin D    TAKE 2 TABS BY MOUTH 2X/ DAY WITH FOOD BEFORE AND AFTER DINNER FOR LIFE-CRUSH 1ST 3 MONTH, SPACE MVI       cyclobenzaprine 10 MG Tabs   Commonly known as:  FLEXERIL    TAKE 1 TABLET BY MOUTH 3 TIMES A DAY AS NEEDED FOR MUSCLE SPASMS       docusate sodium 100 MG Caps   Commonly known as:  COLACE    Take 100 mg by mouth 2 times a day.   Dose:  100 mg       furosemide 40 MG Tabs   Commonly known as:  LASIX    Take 20 mg by mouth every day.   Dose:  20 mg       gabapentin 600 MG tablet   Commonly known as:  NEURONTIN    Doctor's comments:  Authorization of a refill request.   TAKE 1 TABLET BY MOUTH 3 TIMES A DAY       HYDROmorphone 4 MG Tabs   Commonly known as:  DILAUDID    Take 8 mg by mouth 3 times a day as needed for Severe Pain.   Dose:  8 mg       insulin  glargine 100 UNIT/ML Soln   Commonly known as:  LANTUS    Inject 8 Units as instructed every morning.   Dose:  8 Units       levothyroxine 137 MCG Tabs   Commonly known as:  SYNTHROID    Doctor's comments:  This is an authorization for a requested refill   TAKE 1 TABLET BY MOUTH EVERY DAY       NYSTOP [id number] UNIT/GM Powd    Doctor's comments:  Authorization of a refill request.   APPLY 2 TO 3 TIMES A DAY AS NEEDED       omeprazole 40 MG delayed-release capsule   Commonly known as:  PRILOSEC    Take 1 Cap by mouth every day.   Dose:  40 mg       ondansetron 4 MG Tbdp   Commonly known as:  ZOFRAN ODT    Take 1 Tab by mouth every 8 hours as needed. Nausea and vomiting   Dose:  4 mg       oxycodone immediate release 10 MG immediate release tablet   Commonly known as:  ROXICODONE    Take 10 mg by mouth 3 times a day. @0700, 1200, and 2000   Dose:  10 mg       polyethylene glycol 3350 Powd   Commonly known as:  MIRALAX    Take 17 g by mouth every day.   Dose:  17 g       pravastatin 20 MG Tabs   Commonly known as:  PRAVACHOL    Take 1 Tab by mouth every day.   Dose:  20 mg       * PredniSONE 2 MG Tbec    Take 2 mg by mouth every day. Pt has another RX for 5MG Pt takes total of 7MG   Dose:  2 mg       * predniSONE 5 MG Tabs   Commonly known as:  DELTASONE    Doctor's comments:  In addition to prednisone 1 mg prescription   1 to 4 tabs by mouth daily as directed       PROBIOTIC DAILY PO    Take 1 Cap by mouth every evening.   Dose:  1 Cap       sennosides 8.6 MG Tabs   Commonly known as:  SENOKOT    Take 17.2 mg by mouth 2 times a day.   Dose:  17.2 mg       sertraline 100 MG Tabs   Commonly known as:  ZOLOFT    Take 1 Tab by mouth every evening.   Dose:  100 mg       tacrolimus 1 MG Caps   Commonly known as:  PROGRAF    Take 1.5-2 mg by mouth 2 times a day. Pt takes: 2MG in AM 1.5MG HS   Dose:  1.5-2 mg       trazodone 50 MG Tabs   Commonly known as:  DESYREL    Take 1-2 Tabs by mouth every bedtime.   Dose:   mg          * Notice:  This list has 4 medication(s) that are the same as other medications prescribed for you. Read the directions carefully, and ask your doctor or other care provider to review them with you.            Procedures and tests performed during your visit     CBC WITHOUT DIFFERENTIAL    COMP METABOLIC PANEL    CONSENT FOR CONTRAST INJECTION    CT-ABDOMEN-PELVIS WITH    ESTIMATED GFR    IV Saline Lock    URINALYSIS,CULTURE IF INDICATED        Discharge Instructions       Abdominal Pain (Nonspecific)  Your exam might not show the exact reason you have abdominal pain. Since there are many different causes of abdominal pain, another checkup and more tests may be needed. It is very important to follow up for lasting (persistent) or worsening symptoms. A possible cause of abdominal pain in any person who still has his or her appendix is acute appendicitis. Appendicitis is often hard to diagnose. Normal blood tests, urine tests, ultrasound, and CT scans do not completely rule out early appendicitis or other causes of abdominal pain. Sometimes, only the changes that happen over time will allow appendicitis and other causes of abdominal pain to be determined. Other potential problems that may require surgery may also take time to become more apparent. Because of this, it is important that you follow all of the instructions below.  HOME CARE INSTRUCTIONS   · Rest as much as possible.   · Do not eat solid food until your pain is gone.   · While adults or children have pain: A diet of water, weak decaffeinated tea, broth or bouillon, gelatin, oral rehydration solutions (ORS), frozen ice pops, or ice chips may be helpful.   · When pain is gone in adults or children: Start a light diet (dry toast, crackers, applesauce, or white rice). Increase the diet slowly as long as it does not bother you. Eat no dairy products (including cheese and eggs) and no spicy, fatty, fried, or high-fiber foods.   · Use no alcohol, caffeine, or  cigarettes.   · Take your regular medicines unless your caregiver told you not to.   · Take any prescribed medicine as directed.   · Only take over-the-counter or prescription medicines for pain, discomfort, or fever as directed by your caregiver. Do not give aspirin to children.   If your caregiver has given you a follow-up appointment, it is very important to keep that appointment. Not keeping the appointment could result in a permanent injury and/or lasting (chronic) pain and/or disability. If there is any problem keeping the appointment, you must call to reschedule.   SEEK IMMEDIATE MEDICAL CARE IF:   · Your pain is not gone in 24 hours.   · Your pain becomes worse, changes location, or feels different.   · You or your child has an oral temperature above 102° F (38.9° C), not controlled by medicine.   · Your baby is older than 3 months with a rectal temperature of 102° F (38.9° C) or higher.   · Your baby is 3 months old or younger with a rectal temperature of 100.4° F (38° C) or higher.   · You have shaking chills.   · You keep throwing up (vomiting) or cannot drink liquids.   · There is blood in your vomit or you see blood in your bowel movements.   · Your bowel movements become dark or black.   · You have frequent bowel movements.   · Your bowel movements stop (become blocked) or you cannot pass gas.   · You have bloody, frequent, or painful urination.   · You have yellow discoloration in the skin or whites of the eyes.   · Your stomach becomes bloated or bigger.   · You have dizziness or fainting.   · You have chest or back pain.   MAKE SURE YOU:   · Understand these instructions.   · Will watch your condition.   · Will get help right away if you are not doing well or get worse.   Document Released: 12/18/2006 Document Revised: 03/11/2013 Document Reviewed: 11/15/2010  ExitMindie® Patient Information ©2013 Signpost.  Constipation, Adult  Constipation is when a person:  · Poops (has a bowel movement) less  than 3 times a week.  · Has a hard time pooping.  · Has poop that is dry, hard, or bigger than normal.  HOME CARE   · Eat foods with a lot of fiber in them. This includes fruits, vegetables, beans, and whole grains such as brown rice.  · Avoid fatty foods and foods with a lot of sugar. This includes french fries, hamburgers, cookies, candy, and soda.  · If you are not getting enough fiber from food, take products with added fiber in them (supplements).  · Drink enough fluid to keep your pee (urine) clear or pale yellow.  · Exercise on a regular basis, or as told by your doctor.  · Go to the restroom when you feel like you need to poop. Do not hold it.  · Only take medicine as told by your doctor. Do not take medicines that help you poop (laxatives) without talking to your doctor first.  GET HELP RIGHT AWAY IF:   · You have bright red blood in your poop (stool).  · Your constipation lasts more than 4 days or gets worse.  · You have belly (abdominal) or butt (rectal) pain.  · You have thin poop (as thin as a pencil).  · You lose weight, and it cannot be explained.  MAKE SURE YOU:   · Understand these instructions.  · Will watch your condition.  · Will get help right away if you are not doing well or get worse.     This information is not intended to replace advice given to you by your health care provider. Make sure you discuss any questions you have with your health care provider.     Document Released: 06/05/2009 Document Revised: 01/08/2016 Document Reviewed: 09/29/2014  Elsevier Interactive Patient Education ©2016 Elsevier Inc.            Patient Information     Patient Information    Following emergency treatment: all patient requiring follow-up care must return either to a private physician or a clinic if your condition worsens before you are able to obtain further medical attention, please return to the emergency room.     Billing Information    At [facility], we work to make the billing process streamlined  for our patients.  Our Representatives are here to answer any questions you may have regarding your hospital bill.  If you have insurance coverage and have supplied your insurance information to us, we will submit a claim to your insurer on your behalf.  Should you have any questions regarding your bill, we can be reached online or by phone as follows:  Online: You are able pay your bills online or live chat with our representatives about any billing questions you may have. We are here to help Monday - Friday from 8:00am to 7:30pm and 9:00am - 12:00pm on Saturdays.  Please visit [email or web address]  for more information.   Phone:  [phone] or [phone]    Please note that your emergency physician, surgeon, pathologist, radiologist, anesthesiologist, and other specialists are not employed by [facility] and will therefore bill separately for their services.  Please contact them directly for any questions concerning their bills at the numbers below:     Emergency Physician Services:  [phone]  [facility]:  [phone]  [facility]:  [phone]  [facility]:  [phone]    1. Your final bill may vary from the amount quoted upon discharge if all procedures are not complete at that time, or if your doctor has additional procedures of which we are not aware. You will receive an additional bill if you return to the Emergency Department at [facility] for suture removal regardless of the facility of which the sutures were placed.     2. Please arrange for settlement of this account at the emergency registration.    3. All self-pay accounts are due in full at the time of treatment.  If you are unable to meet this obligation then payment is expected within 4-5 days.     4. If you have had radiology studies (CT, X-ray, Ultrasound, MRI), you have received a preliminary result during your emergency department visit. Please contact  the radiology department [phone] to receive a copy of your final result. Please discuss the Final result with your primary physician or with the follow up physician provided.     Crisis Hotline:  [place] Crisis Hotline:  6-270-PJGJEDC or 1-863.488.5455  [place] Crisis Hotline:    1-473.511.3715 or 829-561-4391         ED Discharge Follow Up Questions    1. In order to provide you with very good care, we would like to follow up with a phone call in the next few days.  May we have your permission to contact you?     YES /  NO    2. What is the best phone number to call you? (       )_____-__________    3. What is the best time to call you?      Morning  /  Afternoon  /  Evening                   Patient Signature:  ____________________________________________________________    Date:  ____________________________________________________________      Your appointments     Jan 16, 2017 10:15 AM   ECHO with ECHO [facility]   ECHOCARDIOLOGY [facility])    [address]  [place] [address]   [phone]           No prep            Jan 18, 2017  8:30 AM   MA DX30 with S LENA MG 1   [facility] IMAGING [facility] MAMMOGRAPHY ([place])    [address]  [place] [address]   [phone]            Jan 20, 2017  1:20 PM   Access New To You with Bernarda Reyes D.O.   [facility])    [address]  [place] [address]   [phone]            Feb 15, 2017  8:00 AM   Adult Draw/Collection with [facility]   [facility] (--)    [address]  [place] [address]   [phone]            Mar 08, 2017  8:00 AM   Adult Draw/Collection with LAB [facility]   [facility] (--)    [address]  [place] [address]   [phone]            Apr 03, 2017 10:30 AM   FOLLOW UP with Maxwell Phan M.D.   [facility] (--)    [address] , [facility] [address]  [place] [address]   [phone]

## 2017-01-14 NOTE — ED AVS SNAPSHOT
JumpHawk Access Code: Activation code not generated  Current JumpHawk Status: Active    [employer]  A secure, online tool to manage your health information     [employer]’s JumpHawk® is a secure, online tool that connects you to your personalized health information from the privacy of your home -- day or night - making it very easy for you to manage your healthcare. Once the activation process is completed, you can even access your medical information using the JumpHawk kacy, which is available for free in the Apple Kacy store or Google Play store.     JumpHawk provides the following levels of access (as shown below):   My Chart Features   [facility] Primary Care Doctor [facility]  Specialists [facility]  Urgent  Care Non-[facility]  Primary Care  Doctor   Email your healthcare team securely and privately 24/7 X X X X   Manage appointments: schedule your next appointment; view details of past/upcoming appointments X      Request prescription refills. X      View recent personal medical records, including lab and immunizations X X X X   View health record, including health history, allergies, medications X X X X   Read reports about your outpatient visits, procedures, consult and ER notes X X X X   See your discharge summary, which is a recap of your hospital and/or ER visit that includes your diagnosis, lab results, and care plan. X X       How to register for JumpHawk:  1. Go to  [email or web address].  2. Click on the Sign Up Now box, which takes you to the New Member Sign Up page. You will need to provide the following information:  a. Enter your JumpHawk Access Code exactly as it appears at the top of this page. (You will not need to use this code after you’ve completed the sign-up process. If you do not sign up before the expiration date, you must request a new code.)   b. Enter your date of birth.   c. Enter your home email address.   d. Click Submit, and follow the next screen’s instructions.  3. Create a JumpHawk ID. This will  be your Flitto login ID and cannot be changed, so think of one that is secure and easy to remember.  4. Create a Flitto password. You can change your password at any time.  5. Enter your Password Reset Question and Answer. This can be used at a later time if you forget your password.   6. Enter your e-mail address. This allows you to receive e-mail notifications when new information is available in Flitto.  7. Click Sign Up. You can now view your health information.    For assistance activating your Flitto account, call [phone]

## 2017-01-14 NOTE — ED AVS SNAPSHOT
1/14/2017          Roxana Cuba  [address]  [place] [address]    Dear Roxana:    [facility] wants to ensure your discharge home is safe and you or your loved ones have had all your questions answered regarding your care after you leave the hospital.    You may receive a telephone call within two days of your discharge.  This call is to make certain you understand your discharge instructions as well as ensure we provided you with the best care possible during your stay with us.     The call will only last approximately 3-5 minutes and will be done by a nurse.    Once again, we want to ensure your discharge home is safe and that you have a clear understanding of any next steps in your care.  If you have any questions or concerns, please do not hesitate to contact us, we are here for you.  Thank you for choosing [facility] for your healthcare needs.    Sincerely,    Fredrick Figueroa    [facility]

## 2017-01-15 ENCOUNTER — APPOINTMENT (OUTPATIENT)
Dept: RADIOLOGY | Facility: MEDICAL CENTER | Age: 57
DRG: 178 | End: 2017-01-15
Attending: INTERNAL MEDICINE
Payer: MEDICARE

## 2017-01-15 LAB — FOLATE RBC-MCNC: NORMAL NG/ML

## 2017-01-15 NOTE — ED PROVIDER NOTES
ED Provider Note    Scribed for Elisa Lugo M.D. by Latoya Harman. 1/14/2017, 7:48 PM.    Primary care provider: Bernarda Reyes D.O.  Means of arrival: EMS  History obtained from: Patient  History limited by: None     CHIEF COMPLAINT  Chief Complaint   Patient presents with   • Abdominal Pain     pt with co no bowel movement for over 10 days, seen in [facility] er 2 days ago, recent hospitalization for bowel obstruction 11 days ago, pt had golytely to drink states drank it all over last 2 days       HPI  Roxana Cuba is a 56 y.o. female who presents to the Emergency Department for evaluation of abdominal pain. Patient reports her last bowel movement was 11 days ago. She describes the pain is sharp and cramping in nature. Patient states she vomited once yesterday. Patient states she gained 5 pounds since onset of symptoms. Denies flatulence or diarrhea. Patient reports history of small bowl obstruction. She states she was seen 10 days ago at [facility] for bowl obstruction. She states she was evaluated again two days ago. Patient states she was given Golytely and enema, denies any relief of symptoms. Patient reports history of hypertension. Patient reports history of chronic pain and is currently taking several pain medications at this time.       REVIEW OF SYSTEMS  Pertinent positives include abdominal pain, vomiting, constipation. Pertinent negatives include no flatulence or diarrhea.  All other systems reviewed and negative.    PAST MEDICAL HISTORY   has a past medical history of Cirrhosis (CMS-HCC) (December 2011); S/P cholecystectomy; GERD (gastroesophageal reflux disease); Psychiatric disorder; Fracture of left foot; Bronchitis ( ); CKD (chronic kidney disease) stage 3, GFR 30-59 ml/min; Breath shortness; Chronic fatigue and malaise; VRE (vancomycin-resistant Enterococci); Low back pain ( ); Pneumonia; Mild aortic regurgitation and aortic valve sclerosis ( ); Splenomegaly (7/14/2015); Small  bowel obstruction (CMS-HCC); and On home oxygen therapy.    SURGICAL HISTORY   has past surgical history that includes anesth,nose,sinus surgery; makayla by laparoscopy (9/19/2009); exam under anesthesia (11/11/2009); hysterectomy, total abdominal; other; gastroscopy-endo (3/12/2010); gastric banding laparoscopic; bronchoscopy-endo (5/29/2012); bronchoscopy-endo (6/19/2012); sigmoidoscopy flex (9/26/2012); recovery (2/27/2013); bronchoscopy-endo (11/15/2013); recovery (1/21/2014); recovery (3/24/2014); hemorrhoidectomy (11/11/2009); gastroscopy (9/28/2012); carpal tunnel release; bone marrow aspiration (12/31/2012); bone marrow biopsy, ndl/trocar (12/31/2012); recovery (3/31/2014); other abdominal surgery (December 2011); bone marrow aspiration (3/16/2015); bone marrow biopsy, ndl/trocar (3/16/2015); lung biopsy open; tonsillectomy; and cholecystectomy.    SOCIAL HISTORY  Social History   Substance Use Topics   • Smoking status: Passive Smoke Exposure - Never Smoker   • Smokeless tobacco: Never Used      Comment: avoid all tobacco products   • Alcohol Use: No      Comment: 05/2009 quit drinking      History   Drug Use No       FAMILY HISTORY  Family History   Problem Relation Age of Onset   • Other Father      Unknown (dead 10 years)   • Diabetes Father    • Heart Disease Father    • Hypertension Father    • Hyperlipidemia Father    • Stroke Father    • Non-contributory Mother    • Hyperlipidemia Mother    • Alcohol/Drug Mother    • Cancer Paternal Aunt    • Alcohol/Drug Maternal Grandmother    • Alcohol/Drug Maternal Grandfather        CURRENT MEDICATIONS  Home Medications     Reviewed by Amanda Yeh R.N. (Registered Nurse) on 01/14/17 at 1921  Med List Status: Not Addressed    Medication Last Dose Status    alprazolam (XANAX) 0.5 MG Tab  Active    ambrisentan (LETAIRIS) 10 MG tablet  Active    aspirin EC (ECOTRIN) 81 MG Tablet Delayed Response 1/2/2017 Active    Calcium Citrate-Vitamin D (CITRACAL + D PO)  "1/2/2017 Active    CITRACAL MAXIMUM 315-250 MG-UNIT Tab  Active    cyclobenzaprine (FLEXERIL) 10 MG Tab 1/2/2017 Active    docusate sodium (COLACE) 100 MG Cap 1/2/2017 Active    furosemide (LASIX) 40 MG Tab 1/2/2017 Active    gabapentin (NEURONTIN) 600 MG tablet 1/2/2017 Active    HYDROmorphone (DILAUDID) 4 MG Tab  Active    insulin glargine (LANTUS) 100 UNIT/ML Solution  Active    levothyroxine (SYNTHROID) 137 MCG Tab 1/2/2017 Active    Multiple Vitamins-Minerals (BARIATRIC FUSION) Chew Tab 1/2/2017 Active    mycophenolate (CELLCEPT) 250 MG Cap 1/2/2017 Active    NYSTOP (MYCOSTATIN) 573435 UNIT/GM Powder  Active    omeprazole (PRILOSEC) 40 MG delayed-release capsule 1/2/2017 Active    ondansetron (ZOFRAN ODT) 4 MG TABLET DISPERSIBLE 1/1/2017 Active    oxycodone immediate release (ROXICODONE) 10 MG immediate release tablet 1/2/2017 Active    polyethylene glycol 3350 (MIRALAX) Powder  Active    pravastatin (PRAVACHOL) 20 MG Tab 1/2/2017 Active    predniSONE (DELTASONE) 5 MG Tab 1/2/2017 Active    PredniSONE 2 MG Tablet Delayed Response 1/2/2017 Active    Probiotic Product (PROBIOTIC DAILY PO) 1/2/2017 Active    sennosides (SENOKOT) 8.6 MG Tab 1/2/2017 Active    sertraline (ZOLOFT) 100 MG Tab 1/2/2017 Active    tacrolimus (PROGRAF) 1 MG Cap 1/2/2017 Active    Tadalafil, PAH, (ADCIRCA) 20 MG Tab 1/2/2017 Active    trazodone (DESYREL) 50 MG Tab 1/2/2017 Active                ALLERGIES  Allergies   Allergen Reactions   • Rhubarb Anaphylaxis   • Vancomycin Hcl      Causes red man syndrome when infused to fast         PHYSICAL EXAM  VITAL SIGNS: /53 mmHg  Pulse 83  Temp(Src) 37.1 °C (98.8 °F)  Resp 20  Ht 1.727 m (5' 8\")  Wt 78.472 kg (173 lb)  BMI 26.31 kg/m2  LMP 01/03/2000  Constitutional: Alert, Tearful uncomfortable   HENT: No signs of trauma, Bilateral external ears normal, Nose normal.   Eyes: Pupils are equal and reactive, Conjunctiva normal, Non-icteric.   Neck: Normal range of motion, No tenderness, " Supple, No stridor.   Cardiovascular: Regular rate and rhythm, no murmurs.   Thorax & Lungs: Normal breath sounds, No respiratory distress, No wheezing, No chest tenderness.   Abdomen: mildly distended, tenderness to epigastric region, no rebound or guarding, Bowel sounds normal, No peritoneal signs.  Skin: Warm, Dry, No erythema, No rash.   Back: No bony tenderness, No CVA tenderness.   Musculoskeletal:  No major deformities noted.   Neurologic: Alert, moving all extremities without difficulty, no focal deficits.  Psychiatric: Anxious     LABS  Results for orders placed or performed during the hospital encounter of 01/14/17   CBC WITHOUT DIFFERENTIAL   Result Value Ref Range    WBC 3.5 (L) 4.8 - 10.8 K/uL    RBC 4.01 (L) 4.20 - 5.40 M/uL    Hemoglobin 11.3 (L) 12.0 - 16.0 g/dL    Hematocrit 35.4 (L) 37.0 - 47.0 %    MCV 88.3 81.4 - 97.8 fL    MCH 28.2 27.0 - 33.0 pg    MCHC 31.9 (L) 33.6 - 35.0 g/dL    RDW 47.1 35.9 - 50.0 fL    Platelet Count 89 (L) 164 - 446 K/uL    MPV 9.0 9.0 - 12.9 fL   COMP METABOLIC PANEL   Result Value Ref Range    Sodium 139 135 - 145 mmol/L    Potassium 4.2 3.6 - 5.5 mmol/L    Chloride 107 96 - 112 mmol/L    Co2 28 20 - 33 mmol/L    Anion Gap 4.0 0.0 - 11.9    Glucose 221 (H) 65 - 99 mg/dL    Bun 15 8 - 22 mg/dL    Creatinine 1.16 0.50 - 1.40 mg/dL    Calcium 9.2 8.5 - 10.5 mg/dL    AST(SGOT) 16 12 - 45 U/L    ALT(SGPT) 10 2 - 50 U/L    Alkaline Phosphatase 31 30 - 99 U/L    Total Bilirubin 0.2 0.1 - 1.5 mg/dL    Albumin 4.0 3.2 - 4.9 g/dL    Total Protein 5.9 (L) 6.0 - 8.2 g/dL    Globulin 1.9 1.9 - 3.5 g/dL    A-G Ratio 2.1 g/dL   ESTIMATED GFR   Result Value Ref Range    GFR If  58 (A) >60 mL/min/1.73 m 2    GFR If Non  48 (A) >60 mL/min/1.73 m 2   URINALYSIS,CULTURE IF INDICATED   Result Value Ref Range    Color Lt. Yellow     Character Clear     Specific Gravity 1.018 <1.035    Ph 7.5 5.0-8.0    Glucose Negative Negative mg/dL    Ketones Negative  Negative mg/dL    Protein Negative Negative mg/dL    Bilirubin Negative Negative    Nitrite Negative Negative    Leukocyte Esterase Negative Negative    Occult Blood Negative Negative    Micro Urine Req see below     Culture Indicated No UA Culture     *Note: Due to a large number of results and/or encounters for the requested time period, some results have not been displayed. A complete set of results can be found in Results Review.     All labs reviewed by me.      RADIOLOGY  CT-ABDOMEN-PELVIS WITH   Final Result      1.  No evidence of bowel obstruction.      2.  Persistent splenomegaly with low-attenuation splenic lesions once again noted.        The radiologist's interpretation of all radiological studies have been reviewed by me.    COURSE & MEDICAL DECISION MAKING  Pertinent Labs & Imaging studies reviewed. (See chart for details)    Differential diagnoses include but are not limited to: Constipation bowel obstruction    7:48 PM - Patient seen and examined at bedside. Patient is requesting pain medication at this time. Patient will be treated with NS infusion 1,000 mL, Zofran 4 mg, Morphine 4 mg/ml. Ordered CT-Abdomen-Pelvis, Estimated GFR, CBC without differential, CMP to evaluate her symptoms.     10:06 PM Recheck: Patient is resting comfortably and reports feeling improved. I updated her on her results, which showed normal liver enzymes, no bowel obstruction, some stool but not overwhelming. Advised patient that pain medication can cause constipation and to follow up with GI . We discussed trying Relistor for opioid constipation she was agreeable to this. She would like to go home and follow-up with her GI doctor as an outpatient. I advised her to follow up with her primary care provider and to return to the ED for worsening of abdominal pain or any other medical problems. She understands and will comply.       The patient will return for new or worsening symptoms and is stable at the time of discharge.  Patient was given return precautions. Patient and/or family member verbalizes understanding and will comply.    DISPOSITION:  Patient will be discharged home in stable condition.    FOLLOW UP:  Osman Matt M.D.  [address] [place] [address] Dr CHUCK THOMPSON [address]  [phone]      as scheduled.    [facility], Emergency Dept  [address]  [place] [address]  [phone]    Return for worsening pain, vomiting or other concerns      OUTPATIENT MEDICATIONS:  Discharge Medication List as of 1/14/2017 10:42 PM              FINAL IMPRESSION  1. Epigastric pain    2. Constipation, unspecified constipation type         This dictation has been created using voice recognition software and/or scribes. The accuracy of the dictation is limited by the abilities of the software and the expertise of the scribes. I expect there may be some errors of grammar and possibly content. I made every attempt to manually correct the errors within my dictation. However, errors related to voice recognition software and/or scribes may still exist and should be interpreted within the appropriate context.     I, Latoya Harman (Scribe), am scribing for, and in the presence of, Elisa Lugo M.D..    Electronically signed by: Latoya Harman (Scribe), 1/14/2017    Elisa BRODY M.D. personally performed the services described in this documentation, as scribed by Latoya Harman in my presence, and it is both accurate and complete.    The note accurately reflects work and decisions made by me.  Elisa Lugo  1/14/2017  11:48 PM

## 2017-01-15 NOTE — ED NOTES
..  Chief Complaint   Patient presents with   • Abdominal Pain     pt with co no bowel movement for over 10 days, seen in [facility] er 2 days ago, recent hospitalization for bowel obstruction 11 days ago, pt had golytely to drink states drank it all over last 2 days

## 2017-01-15 NOTE — ED NOTES
..Pt discharged in stable condition, ambulatory to waiting room in stable condition with family, all belongings with pt, given written and verbal dc instructions no questions voiced

## 2017-01-15 NOTE — DISCHARGE INSTRUCTIONS
Abdominal Pain (Nonspecific)  Your exam might not show the exact reason you have abdominal pain. Since there are many different causes of abdominal pain, another checkup and more tests may be needed. It is very important to follow up for lasting (persistent) or worsening symptoms. A possible cause of abdominal pain in any person who still has his or her appendix is acute appendicitis. Appendicitis is often hard to diagnose. Normal blood tests, urine tests, ultrasound, and CT scans do not completely rule out early appendicitis or other causes of abdominal pain. Sometimes, only the changes that happen over time will allow appendicitis and other causes of abdominal pain to be determined. Other potential problems that may require surgery may also take time to become more apparent. Because of this, it is important that you follow all of the instructions below.  HOME CARE INSTRUCTIONS   · Rest as much as possible.   · Do not eat solid food until your pain is gone.   · While adults or children have pain: A diet of water, weak decaffeinated tea, broth or bouillon, gelatin, oral rehydration solutions (ORS), frozen ice pops, or ice chips may be helpful.   · When pain is gone in adults or children: Start a light diet (dry toast, crackers, applesauce, or white rice). Increase the diet slowly as long as it does not bother you. Eat no dairy products (including cheese and eggs) and no spicy, fatty, fried, or high-fiber foods.   · Use no alcohol, caffeine, or cigarettes.   · Take your regular medicines unless your caregiver told you not to.   · Take any prescribed medicine as directed.   · Only take over-the-counter or prescription medicines for pain, discomfort, or fever as directed by your caregiver. Do not give aspirin to children.   If your caregiver has given you a follow-up appointment, it is very important to keep that appointment. Not keeping the appointment could result in a permanent injury and/or lasting (chronic) pain  and/or disability. If there is any problem keeping the appointment, you must call to reschedule.   SEEK IMMEDIATE MEDICAL CARE IF:   · Your pain is not gone in 24 hours.   · Your pain becomes worse, changes location, or feels different.   · You or your child has an oral temperature above 102° F (38.9° C), not controlled by medicine.   · Your baby is older than 3 months with a rectal temperature of 102° F (38.9° C) or higher.   · Your baby is 3 months old or younger with a rectal temperature of 100.4° F (38° C) or higher.   · You have shaking chills.   · You keep throwing up (vomiting) or cannot drink liquids.   · There is blood in your vomit or you see blood in your bowel movements.   · Your bowel movements become dark or black.   · You have frequent bowel movements.   · Your bowel movements stop (become blocked) or you cannot pass gas.   · You have bloody, frequent, or painful urination.   · You have yellow discoloration in the skin or whites of the eyes.   · Your stomach becomes bloated or bigger.   · You have dizziness or fainting.   · You have chest or back pain.   MAKE SURE YOU:   · Understand these instructions.   · Will watch your condition.   · Will get help right away if you are not doing well or get worse.   Document Released: 12/18/2006 Document Revised: 03/11/2013 Document Reviewed: 11/15/2010  ExitCare® Patient Information ©2013 [employer].  Constipation, Adult  Constipation is when a person:  · Poops (has a bowel movement) less than 3 times a week.  · Has a hard time pooping.  · Has poop that is dry, hard, or bigger than normal.  HOME CARE   · Eat foods with a lot of fiber in them. This includes fruits, vegetables, beans, and whole grains such as brown rice.  · Avoid fatty foods and foods with a lot of sugar. This includes french fries, hamburgers, cookies, candy, and soda.  · If you are not getting enough fiber from food, take products with added fiber in them (supplements).  · Drink enough fluid  to keep your pee (urine) clear or pale yellow.  · Exercise on a regular basis, or as told by your doctor.  · Go to the restroom when you feel like you need to poop. Do not hold it.  · Only take medicine as told by your doctor. Do not take medicines that help you poop (laxatives) without talking to your doctor first.  GET HELP RIGHT AWAY IF:   · You have bright red blood in your poop (stool).  · Your constipation lasts more than 4 days or gets worse.  · You have belly (abdominal) or butt (rectal) pain.  · You have thin poop (as thin as a pencil).  · You lose weight, and it cannot be explained.  MAKE SURE YOU:   · Understand these instructions.  · Will watch your condition.  · Will get help right away if you are not doing well or get worse.     This information is not intended to replace advice given to you by your health care provider. Make sure you discuss any questions you have with your health care provider.     Document Released: 06/05/2009 Document Revised: 01/08/2016 Document Reviewed: 09/29/2014  [employer] Interactive Patient Education ©2016 [employer]

## 2017-01-15 NOTE — ED NOTES
Pt states pain is better, pt smiling and comfortable in bed, resp are even and unlabored, no further distress noted

## 2017-01-15 NOTE — ED NOTES
Pt  up to nursing station asking how much longer before the doctor will be in that pt is having extreme pain, apologized and told pt that dr will be in as soon as he is available

## 2017-01-16 ENCOUNTER — HOSPITAL ENCOUNTER (INPATIENT)
Facility: MEDICAL CENTER | Age: 57
LOS: 1 days | DRG: 178 | End: 2017-01-17
Attending: INTERNAL MEDICINE | Admitting: INTERNAL MEDICINE
Payer: MEDICARE

## 2017-01-16 ENCOUNTER — APPOINTMENT (OUTPATIENT)
Dept: RADIOLOGY | Facility: MEDICAL CENTER | Age: 57
DRG: 178 | End: 2017-01-16
Attending: INTERNAL MEDICINE
Payer: MEDICARE

## 2017-01-16 ENCOUNTER — RESOLUTE PROFESSIONAL BILLING HOSPITAL PROF FEE (OUTPATIENT)
Dept: HOSPITALIST | Facility: MEDICAL CENTER | Age: 57
End: 2017-01-16
Payer: MEDICARE

## 2017-01-16 ENCOUNTER — APPOINTMENT (OUTPATIENT)
Dept: CARDIOLOGY | Facility: MEDICAL CENTER | Age: 57
End: 2017-01-16
Attending: INTERNAL MEDICINE
Payer: MEDICARE

## 2017-01-16 DIAGNOSIS — R92.8 ABNORMAL MAMMOGRAM: ICD-10-CM

## 2017-01-16 PROBLEM — J18.9 HCAP (HEALTHCARE-ASSOCIATED PNEUMONIA): Status: ACTIVE | Noted: 2017-01-16

## 2017-01-16 PROBLEM — R11.2 NAUSEA & VOMITING: Status: ACTIVE | Noted: 2017-01-16

## 2017-01-16 LAB
ALBUMIN SERPL BCP-MCNC: 3.6 G/DL (ref 3.2–4.9)
ALBUMIN/GLOB SERPL: 1.9 G/DL
ALP SERPL-CCNC: 22 U/L (ref 30–99)
ALT SERPL-CCNC: 22 U/L (ref 2–50)
ANION GAP SERPL CALC-SCNC: 6 MMOL/L (ref 0–11.9)
ANISOCYTOSIS BLD QL SMEAR: ABNORMAL
APPEARANCE UR: CLEAR
APTT PPP: 30 SEC (ref 24.7–36)
AST SERPL-CCNC: 25 U/L (ref 12–45)
BASOPHILS # BLD AUTO: 0 % (ref 0–1.8)
BASOPHILS # BLD: 0 K/UL (ref 0–0.12)
BILIRUB SERPL-MCNC: 0.4 MG/DL (ref 0.1–1.5)
BILIRUB UR QL STRIP.AUTO: NEGATIVE
BUN SERPL-MCNC: 16 MG/DL (ref 8–22)
CALCIUM SERPL-MCNC: 8.8 MG/DL (ref 8.5–10.5)
CHLORIDE SERPL-SCNC: 102 MMOL/L (ref 96–112)
CO2 SERPL-SCNC: 32 MMOL/L (ref 20–33)
COLOR UR: NORMAL
CREAT SERPL-MCNC: 1.06 MG/DL (ref 0.5–1.4)
CRP SERPL HS-MCNC: 13.07 MG/DL (ref 0–0.75)
EKG IMPRESSION: NORMAL
EOSINOPHIL # BLD AUTO: 0 K/UL (ref 0–0.51)
EOSINOPHIL NFR BLD: 0 % (ref 0–6.9)
ERYTHROCYTE [DISTWIDTH] IN BLOOD BY AUTOMATED COUNT: 46.8 FL (ref 35.9–50)
ERYTHROCYTE [SEDIMENTATION RATE] IN BLOOD BY WESTERGREN METHOD: 23 MM/HOUR (ref 0–30)
GFR SERPL CREATININE-BSD FRML MDRD: 53 ML/MIN/1.73 M 2
GLOBULIN SER CALC-MCNC: 1.9 G/DL (ref 1.9–3.5)
GLUCOSE BLD-MCNC: 149 MG/DL (ref 65–99)
GLUCOSE SERPL-MCNC: 165 MG/DL (ref 65–99)
GLUCOSE UR STRIP.AUTO-MCNC: NEGATIVE MG/DL
HCT VFR BLD AUTO: 33.2 % (ref 37–47)
HGB BLD-MCNC: 10.4 G/DL (ref 12–16)
INR PPP: 1.09 (ref 0.87–1.13)
KETONES UR STRIP.AUTO-MCNC: NEGATIVE MG/DL
LACTATE BLD-SCNC: 1.1 MMOL/L (ref 0.5–2)
LACTATE BLD-SCNC: 1.4 MMOL/L (ref 0.5–2)
LEUKOCYTE ESTERASE UR QL STRIP.AUTO: NEGATIVE
LYMPHOCYTES # BLD AUTO: 0.48 K/UL (ref 1–4.8)
LYMPHOCYTES NFR BLD: 5.3 % (ref 22–41)
MAGNESIUM SERPL-MCNC: 2 MG/DL (ref 1.5–2.5)
MANUAL DIFF BLD: NORMAL
MCH RBC QN AUTO: 27.8 PG (ref 27–33)
MCHC RBC AUTO-ENTMCNC: 31.3 G/DL (ref 33.6–35)
MCV RBC AUTO: 88.8 FL (ref 81.4–97.8)
MICRO URNS: NORMAL
MICROCYTES BLD QL SMEAR: ABNORMAL
MONOCYTES # BLD AUTO: 0.24 K/UL (ref 0–0.85)
MONOCYTES NFR BLD AUTO: 2.6 % (ref 0–13.4)
MORPHOLOGY BLD-IMP: NORMAL
NEUTROPHILS # BLD AUTO: 8.38 K/UL (ref 2–7.15)
NEUTROPHILS NFR BLD: 88.6 % (ref 44–72)
NEUTS BAND NFR BLD MANUAL: 3.5 % (ref 0–10)
NITRITE UR QL STRIP.AUTO: NEGATIVE
NRBC # BLD AUTO: 0 K/UL
NRBC BLD AUTO-RTO: 0 /100 WBC
OVALOCYTES BLD QL SMEAR: NORMAL
PH UR STRIP.AUTO: 7 [PH]
PHOSPHATE SERPL-MCNC: 3.2 MG/DL (ref 2.5–4.5)
PLATELET # BLD AUTO: 59 K/UL (ref 164–446)
PLATELET BLD QL SMEAR: NORMAL
PMV BLD AUTO: 9.5 FL (ref 9–12.9)
POIKILOCYTOSIS BLD QL SMEAR: NORMAL
POTASSIUM SERPL-SCNC: 4.1 MMOL/L (ref 3.6–5.5)
PROT SERPL-MCNC: 5.5 G/DL (ref 6–8.2)
PROT UR QL STRIP: NEGATIVE MG/DL
PROTHROMBIN TIME: 14.4 SEC (ref 12–14.6)
RBC # BLD AUTO: 3.74 M/UL (ref 4.2–5.4)
RBC BLD AUTO: PRESENT
RBC UR QL AUTO: NEGATIVE
SODIUM SERPL-SCNC: 140 MMOL/L (ref 135–145)
SP GR UR STRIP.AUTO: 1.01
TSH SERPL DL<=0.005 MIU/L-ACNC: 0.47 UIU/ML (ref 0.3–3.7)
WBC # BLD AUTO: 9.1 K/UL (ref 4.8–10.8)

## 2017-01-16 PROCEDURE — 700102 HCHG RX REV CODE 250 W/ 637 OVERRIDE(OP): Performed by: INTERNAL MEDICINE

## 2017-01-16 PROCEDURE — 82962 GLUCOSE BLOOD TEST: CPT

## 2017-01-16 PROCEDURE — 87040 BLOOD CULTURE FOR BACTERIA: CPT

## 2017-01-16 PROCEDURE — 36415 COLL VENOUS BLD VENIPUNCTURE: CPT

## 2017-01-16 PROCEDURE — 85610 PROTHROMBIN TIME: CPT

## 2017-01-16 PROCEDURE — 71010 DX-CHEST-LIMITED (1 VIEW): CPT

## 2017-01-16 PROCEDURE — 83735 ASSAY OF MAGNESIUM: CPT

## 2017-01-16 PROCEDURE — 85652 RBC SED RATE AUTOMATED: CPT

## 2017-01-16 PROCEDURE — 770001 HCHG ROOM/CARE - MED/SURG/GYN PRIV*

## 2017-01-16 PROCEDURE — 93005 ELECTROCARDIOGRAM TRACING: CPT | Performed by: INTERNAL MEDICINE

## 2017-01-16 PROCEDURE — 700111 HCHG RX REV CODE 636 W/ 250 OVERRIDE (IP): Performed by: INTERNAL MEDICINE

## 2017-01-16 PROCEDURE — 700117 HCHG RX CONTRAST REV CODE 255: Performed by: INTERNAL MEDICINE

## 2017-01-16 PROCEDURE — 700101 HCHG RX REV CODE 250: Performed by: INTERNAL MEDICINE

## 2017-01-16 PROCEDURE — 85027 COMPLETE CBC AUTOMATED: CPT

## 2017-01-16 PROCEDURE — 84100 ASSAY OF PHOSPHORUS: CPT

## 2017-01-16 PROCEDURE — 81003 URINALYSIS AUTO W/O SCOPE: CPT

## 2017-01-16 PROCEDURE — 86140 C-REACTIVE PROTEIN: CPT

## 2017-01-16 PROCEDURE — 83605 ASSAY OF LACTIC ACID: CPT | Mod: 91

## 2017-01-16 PROCEDURE — A9270 NON-COVERED ITEM OR SERVICE: HCPCS | Performed by: INTERNAL MEDICINE

## 2017-01-16 PROCEDURE — 84443 ASSAY THYROID STIM HORMONE: CPT

## 2017-01-16 PROCEDURE — 93010 ELECTROCARDIOGRAM REPORT: CPT | Performed by: INTERNAL MEDICINE

## 2017-01-16 PROCEDURE — 71260 CT THORAX DX C+: CPT

## 2017-01-16 PROCEDURE — 770006 HCHG ROOM/CARE - MED/SURG/GYN SEMI*

## 2017-01-16 PROCEDURE — 85730 THROMBOPLASTIN TIME PARTIAL: CPT

## 2017-01-16 PROCEDURE — 80053 COMPREHEN METABOLIC PANEL: CPT

## 2017-01-16 PROCEDURE — 99223 1ST HOSP IP/OBS HIGH 75: CPT | Mod: AI | Performed by: INTERNAL MEDICINE

## 2017-01-16 PROCEDURE — 85007 BL SMEAR W/DIFF WBC COUNT: CPT

## 2017-01-16 PROCEDURE — 700105 HCHG RX REV CODE 258: Performed by: INTERNAL MEDICINE

## 2017-01-16 PROCEDURE — 74000 DX-ABDOMEN-1 VIEW: CPT

## 2017-01-16 PROCEDURE — 84145 PROCALCITONIN (PCT): CPT

## 2017-01-16 RX ORDER — TRAZODONE HYDROCHLORIDE 50 MG/1
50 TABLET ORAL
Status: DISCONTINUED | OUTPATIENT
Start: 2017-01-16 | End: 2017-01-17 | Stop reason: HOSPADM

## 2017-01-16 RX ORDER — LEVOTHYROXINE SODIUM 137 UG/1
137 TABLET ORAL
Status: DISCONTINUED | OUTPATIENT
Start: 2017-01-17 | End: 2017-01-17 | Stop reason: HOSPADM

## 2017-01-16 RX ORDER — MYCOPHENOLATE MOFETIL 250 MG/1
500 CAPSULE ORAL 2 TIMES DAILY
Status: DISCONTINUED | OUTPATIENT
Start: 2017-01-16 | End: 2017-01-17 | Stop reason: HOSPADM

## 2017-01-16 RX ORDER — TADALAFIL 20 MG/1
40 TABLET ORAL
Status: DISCONTINUED | OUTPATIENT
Start: 2017-01-16 | End: 2017-01-17 | Stop reason: HOSPADM

## 2017-01-16 RX ORDER — GABAPENTIN 300 MG/1
600 CAPSULE ORAL 3 TIMES DAILY
Status: DISCONTINUED | OUTPATIENT
Start: 2017-01-16 | End: 2017-01-17 | Stop reason: HOSPADM

## 2017-01-16 RX ORDER — TACROLIMUS 0.5 MG/1
1.5 CAPSULE ORAL DAILY
Status: DISCONTINUED | OUTPATIENT
Start: 2017-01-17 | End: 2017-01-17 | Stop reason: HOSPADM

## 2017-01-16 RX ORDER — INSULIN GLARGINE 100 [IU]/ML
INJECTION, SOLUTION SUBCUTANEOUS PRN
COMMUNITY
End: 2017-03-21

## 2017-01-16 RX ORDER — PRAVASTATIN SODIUM 20 MG
20 TABLET ORAL DAILY
Status: DISCONTINUED | OUTPATIENT
Start: 2017-01-17 | End: 2017-01-17 | Stop reason: HOSPADM

## 2017-01-16 RX ORDER — ALPRAZOLAM 0.5 MG/1
0.5 TABLET ORAL 3 TIMES DAILY
Status: DISCONTINUED | OUTPATIENT
Start: 2017-01-16 | End: 2017-01-17 | Stop reason: HOSPADM

## 2017-01-16 RX ORDER — ACETAMINOPHEN 500 MG
500 TABLET ORAL EVERY 6 HOURS PRN
Status: DISCONTINUED | OUTPATIENT
Start: 2017-01-16 | End: 2017-01-17 | Stop reason: HOSPADM

## 2017-01-16 RX ORDER — MORPHINE SULFATE 4 MG/ML
2 INJECTION, SOLUTION INTRAMUSCULAR; INTRAVENOUS EVERY 4 HOURS PRN
Status: DISCONTINUED | OUTPATIENT
Start: 2017-01-16 | End: 2017-01-16

## 2017-01-16 RX ORDER — TRAZODONE HYDROCHLORIDE 50 MG/1
50-100 TABLET ORAL
COMMUNITY
End: 2017-01-20 | Stop reason: SDUPTHER

## 2017-01-16 RX ORDER — ONDANSETRON 4 MG/1
4 TABLET, ORALLY DISINTEGRATING ORAL EVERY 4 HOURS PRN
Status: DISCONTINUED | OUTPATIENT
Start: 2017-01-16 | End: 2017-01-17 | Stop reason: HOSPADM

## 2017-01-16 RX ORDER — TACROLIMUS 1 MG/1
2 CAPSULE ORAL
Status: DISCONTINUED | OUTPATIENT
Start: 2017-01-16 | End: 2017-01-17 | Stop reason: HOSPADM

## 2017-01-16 RX ORDER — SERTRALINE HYDROCHLORIDE 100 MG/1
100 TABLET, FILM COATED ORAL
COMMUNITY
End: 2017-11-17 | Stop reason: SDUPTHER

## 2017-01-16 RX ORDER — TRAMADOL HYDROCHLORIDE 50 MG/1
50 TABLET ORAL EVERY 6 HOURS PRN
Status: DISCONTINUED | OUTPATIENT
Start: 2017-01-16 | End: 2017-01-17 | Stop reason: HOSPADM

## 2017-01-16 RX ORDER — HEPARIN SODIUM 5000 [USP'U]/ML
5000 INJECTION, SOLUTION INTRAVENOUS; SUBCUTANEOUS EVERY 8 HOURS
Status: DISCONTINUED | OUTPATIENT
Start: 2017-01-16 | End: 2017-01-17 | Stop reason: HOSPADM

## 2017-01-16 RX ORDER — SODIUM CHLORIDE 9 MG/ML
INJECTION, SOLUTION INTRAVENOUS CONTINUOUS
Status: DISCONTINUED | OUTPATIENT
Start: 2017-01-16 | End: 2017-01-16

## 2017-01-16 RX ORDER — AMBRISENTAN 10 MG/1
10 TABLET, FILM COATED ORAL DAILY
Status: DISCONTINUED | OUTPATIENT
Start: 2017-01-16 | End: 2017-01-17 | Stop reason: HOSPADM

## 2017-01-16 RX ORDER — SODIUM CHLORIDE 9 MG/ML
1000 INJECTION, SOLUTION INTRAVENOUS
Status: COMPLETED | OUTPATIENT
Start: 2017-01-16 | End: 2017-01-16

## 2017-01-16 RX ORDER — SODIUM CHLORIDE, SODIUM LACTATE, POTASSIUM CHLORIDE, CALCIUM CHLORIDE 600; 310; 30; 20 MG/100ML; MG/100ML; MG/100ML; MG/100ML
INJECTION, SOLUTION INTRAVENOUS CONTINUOUS
Status: DISCONTINUED | OUTPATIENT
Start: 2017-01-16 | End: 2017-01-17 | Stop reason: HOSPADM

## 2017-01-16 RX ORDER — SERTRALINE HYDROCHLORIDE 100 MG/1
100 TABLET, FILM COATED ORAL
Status: DISCONTINUED | OUTPATIENT
Start: 2017-01-16 | End: 2017-01-17 | Stop reason: HOSPADM

## 2017-01-16 RX ORDER — MORPHINE SULFATE 4 MG/ML
4 INJECTION, SOLUTION INTRAMUSCULAR; INTRAVENOUS EVERY 4 HOURS PRN
Status: DISCONTINUED | OUTPATIENT
Start: 2017-01-16 | End: 2017-01-17 | Stop reason: HOSPADM

## 2017-01-16 RX ORDER — ACETAMINOPHEN 500 MG
500 TABLET ORAL EVERY 6 HOURS PRN
Status: DISCONTINUED | OUTPATIENT
Start: 2017-01-16 | End: 2017-01-16

## 2017-01-16 RX ORDER — ONDANSETRON 2 MG/ML
4 INJECTION INTRAMUSCULAR; INTRAVENOUS EVERY 4 HOURS PRN
Status: DISCONTINUED | OUTPATIENT
Start: 2017-01-16 | End: 2017-01-17 | Stop reason: HOSPADM

## 2017-01-16 RX ORDER — SODIUM CHLORIDE 9 MG/ML
30 INJECTION, SOLUTION INTRAVENOUS
Status: COMPLETED | OUTPATIENT
Start: 2017-01-16 | End: 2017-01-16

## 2017-01-16 RX ORDER — DEXTROSE MONOHYDRATE 25 G/50ML
25 INJECTION, SOLUTION INTRAVENOUS
Status: DISCONTINUED | OUTPATIENT
Start: 2017-01-16 | End: 2017-01-17 | Stop reason: HOSPADM

## 2017-01-16 RX ORDER — CYCLOBENZAPRINE HCL 10 MG
10 TABLET ORAL 3 TIMES DAILY PRN
Status: DISCONTINUED | OUTPATIENT
Start: 2017-01-16 | End: 2017-01-17 | Stop reason: HOSPADM

## 2017-01-16 RX ORDER — PREDNISONE 5 MG/1
7.5 TABLET ORAL EVERY MORNING
COMMUNITY
End: 2017-10-20

## 2017-01-16 RX ORDER — HYDROMORPHONE HYDROCHLORIDE 4 MG/1
8 TABLET ORAL EVERY 8 HOURS PRN
Status: DISCONTINUED | OUTPATIENT
Start: 2017-01-16 | End: 2017-01-17 | Stop reason: HOSPADM

## 2017-01-16 RX ORDER — ZINC OXIDE 13 %
1 CREAM (GRAM) TOPICAL EVERY EVENING
Status: DISCONTINUED | OUTPATIENT
Start: 2017-01-16 | End: 2017-01-16

## 2017-01-16 RX ORDER — IPRATROPIUM BROMIDE AND ALBUTEROL SULFATE 2.5; .5 MG/3ML; MG/3ML
3 SOLUTION RESPIRATORY (INHALATION)
Status: DISCONTINUED | OUTPATIENT
Start: 2017-01-16 | End: 2017-01-17 | Stop reason: HOSPADM

## 2017-01-16 RX ADMIN — ALPRAZOLAM 0.5 MG: 0.5 TABLET ORAL at 20:26

## 2017-01-16 RX ADMIN — IOHEXOL 100 ML: 350 INJECTION, SOLUTION INTRAVENOUS at 19:26

## 2017-01-16 RX ADMIN — HEPARIN SODIUM 5000 UNITS: 5000 INJECTION, SOLUTION INTRAVENOUS; SUBCUTANEOUS at 21:54

## 2017-01-16 RX ADMIN — MYCOPHENOLATE MOFETIL 500 MG: 250 CAPSULE ORAL at 20:26

## 2017-01-16 RX ADMIN — MORPHINE SULFATE 4 MG: 4 INJECTION INTRAVENOUS at 17:38

## 2017-01-16 RX ADMIN — SODIUM CHLORIDE, POTASSIUM CHLORIDE, SODIUM LACTATE AND CALCIUM CHLORIDE: 600; 310; 30; 20 INJECTION, SOLUTION INTRAVENOUS at 14:46

## 2017-01-16 RX ADMIN — VANCOMYCIN HYDROCHLORIDE 125 MG: 10 INJECTION, POWDER, LYOPHILIZED, FOR SOLUTION INTRAVENOUS at 21:55

## 2017-01-16 RX ADMIN — GABAPENTIN 600 MG: 300 CAPSULE ORAL at 20:26

## 2017-01-16 RX ADMIN — DOXYCYCLINE 100 MG: 100 INJECTION, POWDER, LYOPHILIZED, FOR SOLUTION INTRAVENOUS at 21:55

## 2017-01-16 RX ADMIN — MORPHINE SULFATE 4 MG: 4 INJECTION INTRAVENOUS at 21:59

## 2017-01-16 RX ADMIN — TRAMADOL HYDROCHLORIDE 50 MG: 50 TABLET, COATED ORAL at 14:46

## 2017-01-16 RX ADMIN — SODIUM CHLORIDE 2448 ML: 9 INJECTION, SOLUTION INTRAVENOUS at 18:03

## 2017-01-16 RX ADMIN — TACROLIMUS 2 MG: 1 CAPSULE ORAL at 20:27

## 2017-01-16 RX ADMIN — SODIUM CHLORIDE, POTASSIUM CHLORIDE, SODIUM LACTATE AND CALCIUM CHLORIDE: 600; 310; 30; 20 INJECTION, SOLUTION INTRAVENOUS at 21:59

## 2017-01-16 RX ADMIN — SERTRALINE 100 MG: 100 TABLET, FILM COATED ORAL at 20:26

## 2017-01-16 RX ADMIN — IOHEXOL 50 ML: 240 INJECTION, SOLUTION INTRATHECAL; INTRAVASCULAR; INTRAVENOUS; ORAL at 19:26

## 2017-01-16 ASSESSMENT — LIFESTYLE VARIABLES
REASON UNABLE TO ASSESS: 0
ALCOHOL_USE: NO
REASON UNABLE TO ASSESS: 0
DO YOU DRINK ALCOHOL: NO
EVER_SMOKED: NEVER

## 2017-01-16 ASSESSMENT — PAIN SCALES - GENERAL: PAINLEVEL_OUTOF10: 8

## 2017-01-16 NOTE — PROGRESS NOTES
Direct admit from [facility].  Dr Matt sending, Dr Gardiner accepting.  DX: N/V with dehydration; complicated medical history.  ADT order entered/held and will need to be released upon arrival to unit

## 2017-01-16 NOTE — PROGRESS NOTES
Pt arrived to the floor with  at side, A&O x's 4, VS WNL with the exception of the pt being on 5L O2,  with mild complaints of pain. IV inserted and admission complete, awaiting further orders. Will continue to monitor.

## 2017-01-16 NOTE — IP AVS SNAPSHOT
BookFresh Access Code: Activation code not generated  Current BookFresh Status: Active    [email or web address]  A secure, online tool to manage your health information     [facility]’s BookFresh® is a secure, online tool that connects you to your personalized health information from the privacy of your home -- day or night - making it very easy for you to manage your healthcare. Once the activation process is completed, you can even access your medical information using the BookFresh kacy, which is available for free in the Apple Kacy store or Google Play store.     BookFresh provides the following levels of access (as shown below):   My Chart Features   [facility] Primary Care Doctor [facility]  Specialists [facility]  Urgent  Care Non-[facility]  Primary Care  Doctor   Email your healthcare team securely and privately 24/7 X X X X   Manage appointments: schedule your next appointment; view details of past/upcoming appointments X      Request prescription refills. X      View recent personal medical records, including lab and immunizations X X X X   View health record, including health history, allergies, medications X X X X   Read reports about your outpatient visits, procedures, consult and ER notes X X X X   See your discharge summary, which is a recap of your hospital and/or ER visit that includes your diagnosis, lab results, and care plan. X X       How to register for BookFresh:  1. Go to  [email or web address].  2. Click on the Sign Up Now box, which takes you to the New Member Sign Up page. You will need to provide the following information:  a. Enter your BookFresh Access Code exactly as it appears at the top of this page. (You will not need to use this code after you’ve completed the sign-up process. If you do not sign up before the expiration date, you must request a new code.)   b. Enter your date of birth.   c. Enter your home email address.   d. Click Submit, and follow the next screen’s instructions.  3. Create a BookFresh ID. This will  be your Blizuu login ID and cannot be changed, so think of one that is secure and easy to remember.  4. Create a Blizuu password. You can change your password at any time.  5. Enter your Password Reset Question and Answer. This can be used at a later time if you forget your password.   6. Enter your e-mail address. This allows you to receive e-mail notifications when new information is available in Blizuu.  7. Click Sign Up. You can now view your health information.    For assistance activating your Blizuu account, call [phone]

## 2017-01-16 NOTE — IP AVS SNAPSHOT
" After Visit Summary                                                                                                                  Name:Roxana Cuba  Medical Record Number:[id number]  CSN: [id number]    YOB: 1960   Age: 56 y.o.  Sex: female  HT:1.727 m (5' 8\") WT: 81.647 kg (180 lb)          Admit Date: 1/16/2017     Discharge Date:   Today's Date: 1/17/2017  Attending Doctor:  Jonn Gardiner M.D.                  Allergies:  Rhubarb and Vancomycin hcl            Discharge Instructions       YOB: 1960   Age: 56 y.o.               Admit Date: 1/16/2017     Discharge Date: 1/17/2017  Attending Doctor:  Jonn Gardiner M.D.                  Allergies:  Rhubarb and Vancomycin hcl  Medical History (as on file):   Past Medical History   Diagnosis Date   • Cirrhosis (CMS-HCC) December 2011     Status post liver transplant at [facility]   • S/P cholecystectomy    • GERD (gastroesophageal reflux disease)          • Psychiatric disorder      Mood disorder   • Fracture of left foot    • Bronchitis     • CKD (chronic kidney disease) stage 3, GFR 30-59 ml/min    • Breath shortness    • Chronic fatigue and malaise    • VRE (vancomycin-resistant Enterococci)    • Low back pain     • Pneumonia          • Mild aortic regurgitation and aortic valve sclerosis     • Splenomegaly 7/14/2015   • Small bowel obstruction (CMS-HCC)    • On home oxygen therapy      Past Surgical History   Procedure Laterality Date   • Pr anesth,nose,sinus surgery     • Latricia by laparoscopy  9/19/2009     Performed by BIRD ABDI at SURGERY [facility]   • Exam under anesthesia  11/11/2009     Performed by BIRD ABDI at SURGERY [facility] ORS   • Hysterectomy, total abdominal           • Other       Breast reduction   • Gastroscopy-endo  3/12/2010     Performed by CAMELIA SAMANO at ENDOSCOPY [facility] ORS   • Gastric banding laparoscopic     • Bronchoscopy-endo  5/29/2012     Performed by SUYAPA CAMARENA at ENDOSCOPY " [facility]   • Bronchoscopy-endo  6/19/2012     Performed by MARLENA MURILLO at ENDOSCOPY [facility]   • Sigmoidoscopy flex  9/26/2012     Performed by Kristopher Cardoso M.D. at ENDOSCOPY [facility]   • Recovery  2/27/2013     Performed by Ir-Recovery Surgery at SURGERY SAME DAY [facility]   • Bronchoscopy-endo  11/15/2013     Performed by Ha Perez M.D. at ENDOSCOPY [facility]   • Recovery  1/21/2014     Performed by Ir-Recovery Surgery at SURGERY SAME DAY [facility] ORS   • Recovery  3/24/2014     Performed by Cath-Recovery Surgery at SURGERY SAME DAY [facility]   • Hemorrhoidectomy  11/11/2009     Performed by BIRD ABDI at SURGERY [facility]   • Gastroscopy  9/28/2012     Performed by Aravind Richards M.D. at SURGERY [facility]   • Carpal tunnel release           • Bone marrow aspiration  12/31/2012     Performed by Josemanuel Real M.D. at ENDOSCOPY [facility]   • Bone marrow biopsy, ndl/trocar  12/31/2012     Performed by Josemanuel Real M.D. at ENDOSCOPY [facility]   • Recovery  3/31/2014     Performed by Ir-Recovery Surgery at SURGERY [facility]   • Other abdominal surgery  December 2011     Liver transplant at [facility] by Dr. Canada.   • Bone marrow aspiration  3/16/2015     Performed by Marlena Hi M.D. at ENDOSCOPY [facility]   • Bone marrow biopsy, ndl/trocar  3/16/2015     Performed by Marlena Hi M.D. at ENDOSCOPY [facility]   • Lung biopsy open     • Tonsillectomy     • Cholecystectomy         Discharge Instructions  Blood Pressure: 127/67 mmHg  Weight: 81.647 kg (180 lb)    Discharged to home by car with relative. Discharged via wheelchair, hospital escort: Yes.    Special equipment needed: Not Applicable    Belongings with: Personal    Instructions:    Follow up with primary care provider   Follow up with Dr. Matt 2 weeks     Be sure to schedule a follow-up appointment with your primary care doctor or any specialists  as instructed.     Discharge Plan:   Diet Plan: Discussed  Activity Level: Discussed  Confirmed Follow up Appointment: Patient to Call and Schedule Appointment  Confirmed Symptoms Management: Discussed  Medication Reconciliation Updated: Yes  Influenza Vaccine Indication: Not indicated: Previously immunized this influenza season and > 8 years of age    DIET:  You may eat any foods that you can tolerate.  It is a good idea to eat a high fiber diet and take in plenty of fluids to prevent constipation.  If you do become constipated you may want to take a mild laxative or take ducolax tablets on a daily basis until your bowel habits are regular.  Constipation can be very uncomfortable, along with straining, after recent surgery.    I understand that a diet low in cholesterol, fat, and sodium is recommended for good health. Unless I have been given specific instructions below for another diet, I accept this instruction as my diet prescription.       Discharge Medication Instructions:    Below are the medications your physician expects you to take upon discharge:    Review all your home medications and newly ordered medications with your doctor and/or pharmacist. Follow medication instructions as directed by your doctor and/or pharmacist.  GENERAL POST-OPERATIVE  PATIENT INSTRUCTIONS      FOLLOW-UP:  Please call your physician/ return to ER if you have any fevers greater than 100.4, drainage from your wound that is not clear or looks infected, persistent bleeding, increasing abdominal pain, problems urinating, or persistent nausea/vomiting.      WOUND CARE INSTRUCTIONS:  Keep a dry clean dressing on the wound if there is drainage. The initial bandage may be removed after 24 hours.  Once the wound has quit draining you may leave it open to air.  If clothing rubs against the wound or causes irritation and the wound is not draining you may cover it with a dry dressing during the daytime.  Try to keep the wound dry and avoid  ointments on the wound unless directed to do so.  If the wound becomes bright red and painful or starts to drain infected material that is not clear, please contact your physician immediately.  If the wound is mildly pink and has a thick firm ridge underneath it, this is normal, and is referred to as a healing ridge.  This will resolve over the next 4-6 weeks. No baths, hot tubs, swimming, no submerging incisions, unit til healed.     ACTIVITY:  You are encouraged to cough and deep breath or use your incentive spirometer if you were given one, every 15-30 minutes when awake.  This will help prevent respiratory complications and low grade fevers post-operatively if you had a general anesthetic.  You may want to hug a pillow when coughing and sneezing to add additional support to the surgical area, if you had abdominal or chest surgery, which will decrease pain during these times.  You are encouraged to walk and engage in light activity for the next two weeks.  You should not lift more than 10 pounds during this time frame as it could put you at increased risk for complications.      MEDICATIONS:  Try to take narcotic medications and anti-inflammatory medications, such as tylenol, ibuprofen, naprosyn, etc., with food.  This will minimize stomach upset from the medication.  Should you develop nausea and vomiting from the pain medication, or develop a rash, please discontinue the medication and contact your physician.  You should not drive, make important decisions, or operate machinery when taking narcotic pain medication.    QUESTIONS:  Please feel free to call your physician or the hospital  if you have any questions, and they will be glad to assist you.       Discharge Instructions      Special Instructions: None    · Is patient discharged on Warfarin / Coumadin?   No     · Is patient Post Blood Transfusion?  No    Depression / Suicide Risk    As you are discharged from this [facility], it is  important to learn how to keep safe from harming yourself.    Recognize the warning signs:  · Abrupt changes in personality, positive or negative- including increase in energy   · Giving away possessions  · Change in eating patterns- significant weight changes-  positive or negative  · Change in sleeping patterns- unable to sleep or sleeping all the time   · Unwillingness or inability to communicate  · Depression  · Unusual sadness, discouragement and loneliness  · Talk of wanting to die  · Neglect of personal appearance   · Rebelliousness- reckless behavior  · Withdrawal from people/activities they love  · Confusion- inability to concentrate     If you or a loved one observes any of these behaviors or has concerns about self-harm, here's what you can do:  · Talk about it- your feelings and reasons for harming yourself  · Remove any means that you might use to hurt yourself (examples: pills, rope, extension cords, firearm)  · Get professional help from the community (Mental Health, Substance Abuse, psychological counseling)  · Do not be alone:Call your Safe Contact- someone whom you trust who will be there for you.  · Call your local CRISIS HOTLINE 531-9395 or 908-178-2856  · Call your local Children's Mobile Crisis Response Team [place] (708) 699-8087 or www.[employer]  · Call the toll free National Suicide Prevention Hotlines   · National Suicide Prevention Lifeline 231-273-DPKX (7598)  · National Hope Line Network 800-SUICIDE (306-5054)            Your appointments     Jan 20, 2017  1:20 PM   Access New To You with Bernarda Reyes D.O.   [facility])    [address]  [place] [address]   [phone]            Jan 31, 2017  8:15 AM   ECHO with [facility]   ECHOCARDIOLOGY [facility])    [address]  [place] [address]   [phone]           No prep            Feb 01, 2017 12:30 PM   MA DX30 with ED PAREDES MG 1   [facility]  [facility] ([place])    [address]  [place] [address]   [phone]            Feb 15, 2017  8:00 AM   Adult Draw/Collection with LAB PHILLIP   [facility] (--)    [address]  [place] [address]   [phone]            Mar 08, 2017  8:00 AM   Adult Draw/Collection with LAB [facility]   [facility] (--)    [address]  [place] [address]   [phone]            Apr 03, 2017 10:30 AM   FOLLOW UP with Maxwell Phan M.D.   [facility] (--)    [address]  [place] [id number] [phone]              Follow-up Information     1. Follow up with Bernarda Reyes D.O. In 1 week.    Specialty:  Family Medicine    Contact information    [address]  [address]  [place] [address] [phone]          2. Follow up with Osman Matt M.D. In 2 weeks.    Specialty:  Gastroenterology    Contact information    [address]  [place] [address]  [phone]           Discharge Medication Instructions:    Below are the medications your physician expects you to take upon discharge:    Review all your home medications and newly ordered medications with your doctor and/or pharmacist. Follow medication instructions as directed by your doctor and/or pharmacist.    Please keep your medication list with you and share with your physician.               Medication List      START taking these medications        Instructions    amoxicillin-clavulanate 875-125 MG Tabs   Commonly known as:  AUGMENTIN    Take 1 Tab by mouth 2 times a day for 8 days.   Dose:  1 Tab         CHANGE how you take these medications        Instructions    ondansetron 4 MG Tbdp   What changed:    - reasons to take this  - additional instructions   Commonly known as:  ZOFRAN ODT    Take 1 Tab by mouth every 8 hours as needed. Nausea and vomiting   Dose:  4 mg         CONTINUE taking these medications        Instructions    ADCIRCA 20 MG Tabs   Generic drug:   Tadalafil (PAH)    Take 40 mg by mouth every bedtime.   Dose:  40 mg       alprazolam 0.5 MG Tabs   Last time this was given:  0.5 mg on 1/17/2017  9:34 AM   Commonly known as:  XANAX    Take 0.5 mg by mouth 3 times a day.   Dose:  0.5 mg       ambrisentan 10 MG tablet   Commonly known as:  LETAIRIS    Take 1 Tab by mouth every day.   Dose:  10 mg       aspirin EC 81 MG Tbec   Last time this was given:  81 mg on 1/17/2017  9:35 AM   Commonly known as:  ECOTRIN    Take 81 mg by mouth every morning.   Dose:  81 mg       CELLCEPT 250 MG Caps   Last time this was given:  500 mg on 1/17/2017  9:35 AM   Generic drug:  mycophenolate    Take 500 mg by mouth 2 times a day.   Dose:  500 mg       CITRACAL PO    Take 2 Tabs by mouth 2 Times a Day.   Dose:  2 Tab       cyclobenzaprine 10 MG Tabs   Commonly known as:  FLEXERIL    TAKE 1 TABLET BY MOUTH 3 TIMES A DAY AS NEEDED FOR MUSCLE SPASMS       docusate sodium 100 MG Caps   Commonly known as:  COLACE    Take 100 mg by mouth 2 times a day.   Dose:  100 mg       furosemide 40 MG Tabs   Commonly known as:  LASIX    Take 40 mg by mouth every morning.   Dose:  40 mg       gabapentin 600 MG tablet   Commonly known as:  NEURONTIN    Doctor's comments:  Authorization of a refill request.   TAKE 1 TABLET BY MOUTH 3 TIMES A DAY       HYDROmorphone 4 MG Tabs   Last time this was given:  8 mg on 1/17/2017 11:16 AM   Commonly known as:  DILAUDID    Take 8 mg by mouth 3 times a day as needed for Severe Pain.   Dose:  8 mg       insulin glargine 100 UNIT/ML Soln   Commonly known as:  LANTUS    Inject  as instructed 1 time daily as needed (adjust as needed).       insulin lispro 100 UNIT/ML Soln   Last time this was given:  2 Units on 1/17/2017 11:28 AM   Commonly known as:  HUMALOG    Inject  as instructed 3 times a day before meals. Per sliding scale       levothyroxine 137 MCG Tabs   Last time this was given:  137 mcg on 1/17/2017  6:19 AM   Commonly known as:  SYNTHROID    Doctor's  comments:  This is an authorization for a requested refill   TAKE 1 TABLET BY MOUTH EVERY DAY       omeprazole 40 MG delayed-release capsule   Commonly known as:  PRILOSEC    Take 1 Cap by mouth every day.   Dose:  40 mg       oxycodone immediate release 10 MG immediate release tablet   Commonly known as:  ROXICODONE    Take 10-30 mg by mouth every 6 hours as needed for Moderate Pain.   Dose:  10-30 mg       pravastatin 20 MG Tabs   Commonly known as:  PRAVACHOL    Take 1 Tab by mouth every day.   Dose:  20 mg       predniSONE 5 MG Tabs   Last time this was given:  7 mg on 1/17/2017  9:35 AM   Commonly known as:  DELTASONE    Take 7 mg by mouth every morning.   Dose:  7 mg       PROBIOTIC DAILY PO    Take 1 Cap by mouth every evening.   Dose:  1 Cap       sennosides 8.6 MG Tabs   Commonly known as:  SENOKOT    Take 17.2 mg by mouth 2 times a day.   Dose:  17.2 mg       sertraline 100 MG Tabs   Last time this was given:  100 mg on 1/16/2017  8:26 PM   Commonly known as:  ZOLOFT    Take 100 mg by mouth every bedtime.   Dose:  100 mg       tacrolimus 1 MG Caps   Last time this was given:  1.5 mg on 1/17/2017  9:35 AM   Commonly known as:  PROGRAF    Take 1.5-2 mg by mouth 2 times a day. 2 mg in the AM and 1.5 mg at 2000   Dose:  1.5-2 mg       trazodone 50 MG Tabs   Commonly known as:  DESYREL    Take  mg by mouth at bedtime as needed for Sleep.   Dose:   mg               Instructions           Diet / Nutrition:    Follow any diet instructions given to you by your doctor or the dietician, including how much salt (sodium) you are allowed each day.    If you are overweight, talk to your doctor about a weight reduction plan.    Activity:    Remain physically active following your doctor's instructions about exercise and activity.    Rest often.     Any time you become even a little tired or short of breath, SIT DOWN and rest.    Worsening Symptoms:    Report any of the following signs and symptoms to the  doctor's office immediately:    *Pain of jaw, arm, or neck  *Chest pain not relieved by medication                               *Dizziness or loss of consciousness  *Difficulty breathing even when at rest   *More tired than usual                                       *Bleeding drainage or swelling of surgical site  *Swelling of feet, ankles, legs or stomach                 *Fever (>100ºF)  *Pink or blood tinged sputum  *Weight gain (3lbs/day or 5lbs /week)           *Shock from internal defibrillator (if applicable)  *Palpitations or irregular heartbeats                *Cool and/or numb extremities    Stroke Awareness    Common Risk Factors for Stroke include:    Age  Atrial Fibrillation  Carotid Artery Stenosis  Diabetes Mellitus  Excessive alcohol consumption  High blood pressure  Overweight   Physical inactivity  Smoking    Warning signs and symptoms of a stroke include:    *Sudden numbness or weakness of the face, arm or leg (especially on one side of the body).  *Sudden confusion, trouble speaking or understanding.  *Sudden trouble seeing in one or both eyes.  *Sudden trouble walking, dizziness, loss of balance or coordination.Sudden severe headache with no known cause.    It is very important to get treatment quickly when a stroke occurs. If you experience any of the above warning signs, call 911 immediately.                   Disclaimer         Quit Smoking / Tobacco Use:    I understand the use of any tobacco products increases my chance of suffering from future heart disease or stroke and could cause other illnesses which may shorten my life. Quitting the use of tobacco products is the single most important thing I can do to improve my health. For further information on smoking / tobacco cessation call a Toll Free Quit Line at 1-757.845.1869 (*National [employer] [place]) or 1-954.522.6906 (American Lung Association) or you can access the web based program at www.lungusa.org.    [place] Tobacco Users Help  Line:  (212) 871-4489       Toll Free: 5-982-308-9364  Quit Tobacco Program [facility] (321)754-8188    Crisis Hotline:    [place] Crisis Hotline:  7-733-FTDYFOT or 1-704.177.9040    [place] Crisis Hotline:    1-447.942.6442 or 657-641-5795    Discharge Survey:   Thank you for choosing [facility]. We hope we did everything we could to make your hospital stay a pleasant one. You may be receiving a phone survey and we would appreciate your time and participation in answering the questions. Your input is very valuable to us in our efforts to improve our service to our patients and their families.        My signature on this form indicates that:    1. I have reviewed and understand the above information.  2. My questions regarding this information have been answered to my satisfaction.  3. I have formulated a plan with my discharge nurse to obtain my prescribed medications for home.                  Disclaimer         __________________________________                     __________       ________                       Patient Signature                                                 Date                    Time

## 2017-01-16 NOTE — IP AVS SNAPSHOT
1/17/2017          Roxana Cuba  [address]  [place] [address]    Dear Roxana:    [facility] wants to ensure your discharge home is safe and you or your loved ones have had all your questions answered regarding your care after you leave the hospital.    You may receive a telephone call within two days of your discharge.  This call is to make certain you understand your discharge instructions as well as ensure we provided you with the best care possible during your stay with us.     The call will only last approximately 3-5 minutes and will be done by a nurse.    Once again, we want to ensure your discharge home is safe and that you have a clear understanding of any next steps in your care.  If you have any questions or concerns, please do not hesitate to contact us, we are here for you.  Thank you for choosing [facility] for your healthcare needs.    Sincerely,    Fredrick Figueroa    [facility]

## 2017-01-16 NOTE — IP AVS SNAPSHOT
" <p align=\"LEFT\"><IMG SRC=\"//EMRWB/blob$/[email or web address]\" alt=\"Image\" WIDTH=\"50%\" HEIGHT=\"200\" BORDER=\"\"></p>                   Name:Roxana Cuba  Medical Record Number:[id number]  CSN: [id number]    YOB: 1960   Age: 56 y.o.  Sex: female  HT:1.727 m (5' 8\") WT: 81.647 kg (180 lb)          Admit Date: 1/16/2017     Discharge Date:   Today's Date: 1/17/2017  Attending Doctor:  Jonn Gardiner M.D.                  Allergies:  Rhubarb and Vancomycin hcl          Your appointments     Jan 20, 2017  1:20 PM   Access New To You with Bernarda Reyes D.O.   [facility]    [address], [address]  [place] [address]   [phone]            Jan 31, 2017  8:15 AM   ECHO with ECHO [facility]   ECHOCARDIOLOGY [facility]    [address]  [place] [address]   [phone]           No prep            Feb 01, 2017 12:30 PM   MA DX30 with S [place] MG 1   [facility] IMAGING [facility]    [address]  [place] [address]   [phone]            Feb 15, 2017  8:00 AM   Adult Draw/Collection with LAB [facility]   [facility] (--)    [address]  [place] [address]   [phone]            Mar 08, 2017  8:00 AM   Adult Draw/Collection with LAB [facility]   [facility] (--)    [address]  [place] [address]   [phone]            Apr 03, 2017 10:30 AM   FOLLOW UP with Maxwell Phan M.D.   [facility] (--)    [address], [address]  [place] [id number] [phone]              Follow-up Information     1. Follow up with Bernarda Reyes D.O. In 1 week.    Specialty:  Family Medicine    Contact information    [address]  [address]  [place] [address] [phone]          2. Follow up with Osman Matt M.D. In 2 weeks.    Specialty:  Gastroenterology    Contact information    [address] [place] [address] [phone]           Medication List      Take " these Medications        Instructions    ADCIRCA 20 MG Tabs   Generic drug:  Tadalafil (PAH)    Take 40 mg by mouth every bedtime.   Dose:  40 mg       alprazolam 0.5 MG Tabs   Commonly known as:  XANAX    Take 0.5 mg by mouth 3 times a day.   Dose:  0.5 mg       ambrisentan 10 MG tablet   Commonly known as:  LETAIRIS    Take 1 Tab by mouth every day.   Dose:  10 mg       amoxicillin-clavulanate 875-125 MG Tabs   Commonly known as:  AUGMENTIN    Take 1 Tab by mouth 2 times a day for 8 days.   Dose:  1 Tab       aspirin EC 81 MG Tbec   Commonly known as:  ECOTRIN    Take 81 mg by mouth every morning.   Dose:  81 mg       CELLCEPT 250 MG Caps   Generic drug:  mycophenolate    Take 500 mg by mouth 2 times a day.   Dose:  500 mg       CITRACAL PO    Take 2 Tabs by mouth 2 Times a Day.   Dose:  2 Tab       cyclobenzaprine 10 MG Tabs   Commonly known as:  FLEXERIL    TAKE 1 TABLET BY MOUTH 3 TIMES A DAY AS NEEDED FOR MUSCLE SPASMS       docusate sodium 100 MG Caps   Commonly known as:  COLACE    Take 100 mg by mouth 2 times a day.   Dose:  100 mg       furosemide 40 MG Tabs   Commonly known as:  LASIX    Take 40 mg by mouth every morning.   Dose:  40 mg       gabapentin 600 MG tablet   Commonly known as:  NEURONTIN    Doctor's comments:  Authorization of a refill request.   TAKE 1 TABLET BY MOUTH 3 TIMES A DAY       HYDROmorphone 4 MG Tabs   Commonly known as:  DILAUDID    Take 8 mg by mouth 3 times a day as needed for Severe Pain.   Dose:  8 mg       insulin glargine 100 UNIT/ML Soln   Commonly known as:  LANTUS    Inject  as instructed 1 time daily as needed (adjust as needed).       insulin lispro 100 UNIT/ML Soln   Commonly known as:  HUMALOG    Inject  as instructed 3 times a day before meals. Per sliding scale       levothyroxine 137 MCG Tabs   Commonly known as:  SYNTHROID    Doctor's comments:  This is an authorization for a requested refill   TAKE 1 TABLET BY MOUTH EVERY DAY       omeprazole 40 MG delayed-release  capsule   Commonly known as:  PRILOSEC    Take 1 Cap by mouth every day.   Dose:  40 mg       ondansetron 4 MG Tbdp   What changed:    - reasons to take this  - additional instructions   Commonly known as:  ZOFRAN ODT    Take 1 Tab by mouth every 8 hours as needed. Nausea and vomiting   Dose:  4 mg       oxycodone immediate release 10 MG immediate release tablet   Commonly known as:  ROXICODONE    Take 10-30 mg by mouth every 6 hours as needed for Moderate Pain.   Dose:  10-30 mg       pravastatin 20 MG Tabs   Commonly known as:  PRAVACHOL    Take 1 Tab by mouth every day.   Dose:  20 mg       predniSONE 5 MG Tabs   Commonly known as:  DELTASONE    Take 7 mg by mouth every morning.   Dose:  7 mg       PROBIOTIC DAILY PO    Take 1 Cap by mouth every evening.   Dose:  1 Cap       sennosides 8.6 MG Tabs   Commonly known as:  SENOKOT    Take 17.2 mg by mouth 2 times a day.   Dose:  17.2 mg       sertraline 100 MG Tabs   Commonly known as:  ZOLOFT    Take 100 mg by mouth every bedtime.   Dose:  100 mg       tacrolimus 1 MG Caps   Commonly known as:  PROGRAF    Take 1.5-2 mg by mouth 2 times a day. 2 mg in the AM and 1.5 mg at 2000   Dose:  1.5-2 mg       trazodone 50 MG Tabs   Commonly known as:  DESYREL    Take  mg by mouth at bedtime as needed for Sleep.   Dose:   mg

## 2017-01-17 VITALS
HEIGHT: 68 IN | SYSTOLIC BLOOD PRESSURE: 127 MMHG | BODY MASS INDEX: 27.28 KG/M2 | TEMPERATURE: 97.3 F | DIASTOLIC BLOOD PRESSURE: 67 MMHG | OXYGEN SATURATION: 98 % | HEART RATE: 80 BPM | RESPIRATION RATE: 18 BRPM | WEIGHT: 180 LBS

## 2017-01-17 LAB
ALBUMIN SERPL BCP-MCNC: 3.4 G/DL (ref 3.2–4.9)
ALBUMIN/GLOB SERPL: 1.7 G/DL
ALP SERPL-CCNC: 26 U/L (ref 30–99)
ALT SERPL-CCNC: 18 U/L (ref 2–50)
ANION GAP SERPL CALC-SCNC: 3 MMOL/L (ref 0–11.9)
AST SERPL-CCNC: 17 U/L (ref 12–45)
BILIRUB SERPL-MCNC: 0.4 MG/DL (ref 0.1–1.5)
BUN SERPL-MCNC: 14 MG/DL (ref 8–22)
CALCIUM SERPL-MCNC: 8.8 MG/DL (ref 8.5–10.5)
CHLORIDE SERPL-SCNC: 105 MMOL/L (ref 96–112)
CO2 SERPL-SCNC: 31 MMOL/L (ref 20–33)
CREAT SERPL-MCNC: 0.96 MG/DL (ref 0.5–1.4)
ERYTHROCYTE [DISTWIDTH] IN BLOOD BY AUTOMATED COUNT: 48.5 FL (ref 35.9–50)
GFR SERPL CREATININE-BSD FRML MDRD: 60 ML/MIN/1.73 M 2
GLOBULIN SER CALC-MCNC: 2 G/DL (ref 1.9–3.5)
GLUCOSE BLD-MCNC: 112 MG/DL (ref 65–99)
GLUCOSE BLD-MCNC: 155 MG/DL (ref 65–99)
GLUCOSE SERPL-MCNC: 109 MG/DL (ref 65–99)
HCT VFR BLD AUTO: 33.3 % (ref 37–47)
HGB BLD-MCNC: 10.7 G/DL (ref 12–16)
MAGNESIUM SERPL-MCNC: 1.8 MG/DL (ref 1.5–2.5)
MCH RBC QN AUTO: 28.9 PG (ref 27–33)
MCHC RBC AUTO-ENTMCNC: 32.1 G/DL (ref 33.6–35)
MCV RBC AUTO: 90 FL (ref 81.4–97.8)
MRSA DNA SPEC QL NAA+PROBE: NORMAL
PHOSPHATE SERPL-MCNC: 2.4 MG/DL (ref 2.5–4.5)
PLATELET # BLD AUTO: 61 K/UL (ref 164–446)
PMV BLD AUTO: 9.2 FL (ref 9–12.9)
POTASSIUM SERPL-SCNC: 3.8 MMOL/L (ref 3.6–5.5)
PROT SERPL-MCNC: 5.4 G/DL (ref 6–8.2)
RBC # BLD AUTO: 3.7 M/UL (ref 4.2–5.4)
SIGNIFICANT IND 70042: NORMAL
SITE SITE: NORMAL
SODIUM SERPL-SCNC: 139 MMOL/L (ref 135–145)
SOURCE SOURCE: NORMAL
WBC # BLD AUTO: 6.2 K/UL (ref 4.8–10.8)

## 2017-01-17 PROCEDURE — 700105 HCHG RX REV CODE 258: Performed by: INTERNAL MEDICINE

## 2017-01-17 PROCEDURE — A9270 NON-COVERED ITEM OR SERVICE: HCPCS | Performed by: INTERNAL MEDICINE

## 2017-01-17 PROCEDURE — 85027 COMPLETE CBC AUTOMATED: CPT

## 2017-01-17 PROCEDURE — 700102 HCHG RX REV CODE 250 W/ 637 OVERRIDE(OP): Performed by: INTERNAL MEDICINE

## 2017-01-17 PROCEDURE — 80053 COMPREHEN METABOLIC PANEL: CPT

## 2017-01-17 PROCEDURE — 700111 HCHG RX REV CODE 636 W/ 250 OVERRIDE (IP): Performed by: INTERNAL MEDICINE

## 2017-01-17 PROCEDURE — 87641 MR-STAPH DNA AMP PROBE: CPT

## 2017-01-17 PROCEDURE — 82962 GLUCOSE BLOOD TEST: CPT

## 2017-01-17 PROCEDURE — 83735 ASSAY OF MAGNESIUM: CPT

## 2017-01-17 PROCEDURE — 36415 COLL VENOUS BLD VENIPUNCTURE: CPT

## 2017-01-17 PROCEDURE — 94760 N-INVAS EAR/PLS OXIMETRY 1: CPT

## 2017-01-17 PROCEDURE — 99239 HOSP IP/OBS DSCHRG MGMT >30: CPT | Performed by: HOSPITALIST

## 2017-01-17 PROCEDURE — 700101 HCHG RX REV CODE 250: Performed by: INTERNAL MEDICINE

## 2017-01-17 PROCEDURE — 84100 ASSAY OF PHOSPHORUS: CPT

## 2017-01-17 PROCEDURE — 700111 HCHG RX REV CODE 636 W/ 250 OVERRIDE (IP)

## 2017-01-17 PROCEDURE — 700105 HCHG RX REV CODE 258

## 2017-01-17 RX ORDER — TIOTROPIUM BROMIDE 18 UG/1
1 CAPSULE ORAL; RESPIRATORY (INHALATION) DAILY
Status: DISCONTINUED | OUTPATIENT
Start: 2017-01-17 | End: 2017-01-17 | Stop reason: HOSPADM

## 2017-01-17 RX ORDER — POLYETHYLENE GLYCOL 3350 17 G/17G
1 POWDER, FOR SOLUTION ORAL DAILY
Status: DISCONTINUED | OUTPATIENT
Start: 2017-01-17 | End: 2017-01-17 | Stop reason: HOSPADM

## 2017-01-17 RX ORDER — AMOXICILLIN AND CLAVULANATE POTASSIUM 875; 125 MG/1; MG/1
1 TABLET, FILM COATED ORAL 2 TIMES DAILY
Qty: 16 TAB | Refills: 0 | Status: SHIPPED | OUTPATIENT
Start: 2017-01-17 | End: 2017-01-20 | Stop reason: SDUPTHER

## 2017-01-17 RX ORDER — MORPHINE SULFATE 4 MG/ML
1 INJECTION, SOLUTION INTRAMUSCULAR; INTRAVENOUS ONCE
Status: COMPLETED | OUTPATIENT
Start: 2017-01-17 | End: 2017-01-17

## 2017-01-17 RX ADMIN — GABAPENTIN 600 MG: 300 CAPSULE ORAL at 09:35

## 2017-01-17 RX ADMIN — MORPHINE SULFATE 4 MG: 4 INJECTION INTRAVENOUS at 02:03

## 2017-01-17 RX ADMIN — MORPHINE SULFATE 4 MG: 4 INJECTION INTRAVENOUS at 13:07

## 2017-01-17 RX ADMIN — ALPRAZOLAM 0.5 MG: 0.5 TABLET ORAL at 15:10

## 2017-01-17 RX ADMIN — DOXYCYCLINE 100 MG: 100 INJECTION, POWDER, LYOPHILIZED, FOR SOLUTION INTRAVENOUS at 09:35

## 2017-01-17 RX ADMIN — HEPARIN SODIUM 5000 UNITS: 5000 INJECTION, SOLUTION INTRAVENOUS; SUBCUTANEOUS at 06:25

## 2017-01-17 RX ADMIN — ONDANSETRON 4 MG: 2 INJECTION, SOLUTION INTRAMUSCULAR; INTRAVENOUS at 06:19

## 2017-01-17 RX ADMIN — VANCOMYCIN HYDROCHLORIDE 125 MG: 10 INJECTION, POWDER, LYOPHILIZED, FOR SOLUTION INTRAVENOUS at 06:19

## 2017-01-17 RX ADMIN — HEPARIN SODIUM 5000 UNITS: 5000 INJECTION, SOLUTION INTRAVENOUS; SUBCUTANEOUS at 13:08

## 2017-01-17 RX ADMIN — VANCOMYCIN HYDROCHLORIDE 1200 MG: 10 INJECTION, POWDER, LYOPHILIZED, FOR SOLUTION INTRAVENOUS at 15:13

## 2017-01-17 RX ADMIN — ASPIRIN 81 MG: 81 TABLET ORAL at 09:35

## 2017-01-17 RX ADMIN — PIPERACILLIN AND TAZOBACTAM 3.38 G: 3; .375 INJECTION, POWDER, LYOPHILIZED, FOR SOLUTION INTRAVENOUS; PARENTERAL at 00:30

## 2017-01-17 RX ADMIN — MORPHINE SULFATE 4 MG: 4 INJECTION INTRAVENOUS at 06:20

## 2017-01-17 RX ADMIN — MYCOPHENOLATE MOFETIL 500 MG: 250 CAPSULE ORAL at 09:35

## 2017-01-17 RX ADMIN — TRAMADOL HYDROCHLORIDE 50 MG: 50 TABLET, COATED ORAL at 03:35

## 2017-01-17 RX ADMIN — TACROLIMUS 1.5 MG: 1 CAPSULE ORAL at 09:35

## 2017-01-17 RX ADMIN — PREDNISONE 7 MG: 1 TABLET ORAL at 09:35

## 2017-01-17 RX ADMIN — PIPERACILLIN AND TAZOBACTAM 3.38 G: 3; .375 INJECTION, POWDER, LYOPHILIZED, FOR SOLUTION INTRAVENOUS; PARENTERAL at 06:30

## 2017-01-17 RX ADMIN — VANCOMYCIN HYDROCHLORIDE 2000 MG: 10 INJECTION, POWDER, LYOPHILIZED, FOR SOLUTION INTRAVENOUS at 02:58

## 2017-01-17 RX ADMIN — SODIUM CHLORIDE, POTASSIUM CHLORIDE, SODIUM LACTATE AND CALCIUM CHLORIDE: 600; 310; 30; 20 INJECTION, SOLUTION INTRAVENOUS at 02:03

## 2017-01-17 RX ADMIN — POLYETHYLENE GLYCOL 3350 1 PACKET: 17 POWDER, FOR SOLUTION ORAL at 11:20

## 2017-01-17 RX ADMIN — LEVOTHYROXINE SODIUM 137 MCG: 137 TABLET ORAL at 06:19

## 2017-01-17 RX ADMIN — HYDROMORPHONE HYDROCHLORIDE 8 MG: 4 TABLET ORAL at 11:16

## 2017-01-17 RX ADMIN — ALPRAZOLAM 0.5 MG: 0.5 TABLET ORAL at 09:34

## 2017-01-17 RX ADMIN — IPRATROPIUM BROMIDE AND ALBUTEROL SULFATE 3 ML: .5; 3 SOLUTION RESPIRATORY (INHALATION) at 08:09

## 2017-01-17 RX ADMIN — INSULIN LISPRO 2 UNITS: 100 INJECTION, SOLUTION INTRAVENOUS; SUBCUTANEOUS at 11:28

## 2017-01-17 RX ADMIN — PIPERACILLIN AND TAZOBACTAM 3.38 G: 3; .375 INJECTION, POWDER, LYOPHILIZED, FOR SOLUTION INTRAVENOUS; PARENTERAL at 13:12

## 2017-01-17 RX ADMIN — TIOTROPIUM BROMIDE 1 CAPSULE: 18 CAPSULE ORAL; RESPIRATORY (INHALATION) at 09:37

## 2017-01-17 ASSESSMENT — ENCOUNTER SYMPTOMS
HEARTBURN: 1
EYES NEGATIVE: 1
RESPIRATORY NEGATIVE: 1
CARDIOVASCULAR NEGATIVE: 1

## 2017-01-17 ASSESSMENT — PAIN SCALES - GENERAL
PAINLEVEL_OUTOF10: 10

## 2017-01-17 ASSESSMENT — COPD QUESTIONNAIRES
DURING THE PAST 4 WEEKS HOW MUCH DID YOU FEEL SHORT OF BREATH: MOST  OR ALL OF THE TIME
HAVE YOU SMOKED AT LEAST 100 CIGARETTES IN YOUR ENTIRE LIFE: NO/DON'T KNOW
DO YOU EVER COUGH UP ANY MUCUS OR PHLEGM?: NO/ONLY WITH OCCASIONAL COLDS OR INFECTIONS
COPD SCREENING SCORE: 5

## 2017-01-17 ASSESSMENT — LIFESTYLE VARIABLES: EVER_SMOKED: NEVER

## 2017-01-17 NOTE — H&P
[facility] PHYSICIAN:  Bernarda Damon D.O.    GASTROENTEROLOGIST:  Osman Matt M.D.    HEPATOLOGIST:  Dr. Sudarshan Zapien at [facility] in [place].  His phone number is [phone].    INFECTIOUS DISEASE DOCTOR:  [facility].    CARDIOLOGIST:  Maxwell Phan M.D.    CHIEF COMPLAINT:  Abdominal pain, intractable nausea and vomiting.    HISTORY OF PRESENTING ILLNESS:  This is a 56-year-old female who has been   directly admitted to the hospital per recommendation of Dr. Matt for   evaluation of bowel obstruction.  Patient has a history of liver   transplantation and gastric sleeve surgery.  She was recently admitted to the   hospital on 01/03/2017 with small bowel obstruction.  She was treated   conservatively with subsequent improvement in her symptoms and patient was   discharged home with plans to be followed up in the outpatient setting.    During that hospital stay, she was seen by the surgical team, Dr. Castillo.    Upon followup with her gastroenterologist today, she complained of abdominal   pain, nausea and vomiting concerning for reoccurrence of small bowel   obstruction and she was advised to come into the hospital for repeat   evaluation.  Patient reports that she has been having abdominal pain since   early part of January, which was umbilical in location, dull/sharp in   character and always persistent.  Reports intensity of the pain to be 8/10.    Denies any radiation of her pain.  Denies any improving or worsening factor   associated with her pain.  She denies having any fevers or chills, but   complains of intractable nausea and vomiting.  When questioned regarding the   frequency of vomiting, she reports she must have vomited at least 100 times in   the last one week.  She denies any hematemesis.  She reports that her last   bowel movement was today.  She reports ongoing frequent  loose bowel movement.    She reported her last normal  bowel movement was in December.  She reports that   she is passing gas.  Otherwise, she complains of slight headaches.  Denies   having any chest pain or shortness of breath.  She reports that she had   interval development of cough this morning.  She denies any production of   sputum at this point in time.  Otherwise, denies having any dysuria,   frequency, urgency or hematuria.  She denies any lower extremity swelling.    Denies any palpitations, orthopnea or paroxysmal nocturnal dyspnea.  To note,   the patient does have a history of chronic hypoxemic respiratory failure,   requiring 4-6 liters of oxygen at baseline.    HOME MEDICATIONS:  1.  Calcium citrate.  2.  Prednisone 7 mg daily.  3.  Zoloft 100 mg daily.  4.  Trazodone at bedtime.  5.  Insulin Lantus.  6.  Insulin lispro sliding scale.  7.  Dilaudid tablet 8 mg 3 times a day as needed.  8.  Xanax 0.5 mg three times a day.  9.  Ambrisentan one tablet daily 10 mg.  10.  Docusate 100 mg twice a day.  11.  CellCept that is mycophenolate 500 mg twice a day.  12.  Furosemide 20 mg daily.  13.  Tadalafil 40 mg at bedtime.  14.  Gabapentin 600 mg three times a day.  15.  Pravastatin 20m mg daily.  16.  Omeprazole 40 mg daily.  17.  Synthroid 137 mcg daily.  18.  Flexeril 10 mg three times a day as needed.  19.  Tacrolimus 1.5 to 2 mg twice a day.  20.  ____.  21.  Oxycodone.  22.  Aspirin 81 mg daily.  23.  Probiotic.  24.  Zofran 4 mg daily.    PAST MEDICAL HISTORY:  1.  Diabetes mellitus type 2.  2.  History of fungal pneumonia.  3.  Monoclonal gammopathy of unknown significance.  4.  CKD stage II.  5.  Anxiety.  6.  Depression.  7.  Cirrhosis secondary to sarcoidosis.  8.  Status post liver transplantation in 2011.  9.  Hypertension.  10.  Chronic bronchiolitis.  11.  Hypothyroidism.  12.  Chronic hypoxemic respiratory failure, requiring 4-6 liters of oxygen via   nasal cannula.  13.  Gastroesophageal reflux disease.  14.  Immunosuppressed status.  15.   History of shingles.  16.  Splenomegaly.  17.  Cardiomegaly.  18.  Aortic stenosis.  19.  Left foot fracture.  20.  History of obesity.    PAST SURGICAL HISTORY:  1.  Lung biopsy.  2.  Gastric sleeve surgery in December of 2015.  3. Liver transplantation in 2011.  4.  Cholecystectomy.  5.  Hernia repair.  6.  Hemorrhoidectomy.  7.  Hysterectomy.    FAMILY HISTORY:  Mother with history of lung cancer.  Father with history of   diabetes mellitus/stroke and heart attack and valve repairs.    SOCIAL HISTORY:  Patient denies smoking.  Denies use of alcohol.  Denies any   drugs of abuse.  She is currently living with her , independent with   activities of daily living and IADLs.    ALLERGIES:  PATIENT REPORTS THAT SHE IS ALLERGIC TO VANCOMYCIN, WHICH CAUSES   HER RED MAN SYNDROME ACCORDING TO THE PATIENT.  IN ADDITION, SHE IS ALLERGIC   TO RHUBARB.    REVIEW OF SYSTEMS:  The detailed review of system was reviewed with the   patient and negative other than as listed in the history of presenting illness   and past medical history.    PHYSICAL EXAMINATION:  VITAL SIGNS:  Temperature of 37.2 degrees Celsius, pulse of 82, respiratory   rate of 16, blood pressure of 90/50, weight of 81.6 kilograms and oxygen   saturation of 95% on 5 liters of nasal cannula.  GENERAL:  Patient is alert and oriented x3.  She appears to be in no acute   distress.  HEAD, EYES, AND ENT:  Head is normocephalic.   Extraocular movements are   intact with pupils equal, round and reactive to light and accommodation.    There is no conjunctival pallor.  There is no scleral icterus or nasal   discharge.  Moist mucous membranes are noted.  NECK:  No palpable lymphadenopathy.  No jugular venous distention.  CARDIOVASCULAR SYSTEM:  Regular rate and rhythm.  S1 plus S2.  Soft aortic   area murmur is noted.  ABDOMEN:  Soft, diffusely tender to palpation.  Bowel sounds are positive and   normal in frequency.  No peritoneal signs are noted.  RESPIRATION:   Bilateral basal crackles are noted.  Otherwise, no rhonchi and   no wheezing.  GENITOURINARY SYSTEM:  Not examined.  MUSCULOSKELETAL:  No joint swelling or tenderness on examination.  SKIN:  No rashes, erythema or wound.  NEUROLOGICAL:  Cranial nerves without any gross deficit.  Motor strength of   5/5 in bilateral upper and lower extremity.  No Clostridium difficile.  EXTREMITIES:  Pulses 2+ in bilateral lower extremities.  No edema and no   cyanosis.    LABORATORY STUDIES:  White blood cell count of 9.1, hemoglobin of 10.4 and   platelet count of 59.  Sodium of 140, potassium of 4.1, BUN of 16 and   creatinine of 1.06.  Liver function test within normal limits.  Lactic acid of   1.1.  INR of 1.09.    IMAGING STUDIES:  Chest x-ray obtained on presentation reveals increased   interstitial opacities in both lungs consistent with edema and/or pneumonitis.    Developing bilateral infrahilar/lung base pneumatocele/bulla or possible   thin-walled cavity.  Infectious processes could not be excluded.    An x-ray of the abdomen.  This reveals no evidence of bowel obstruction.    ASSESSMENT AND PLAN:  1.  Abdominal pain with intractable nausea and vomiting, which has remained   persistent since earlier part of January.  She was advised inpatient admission   per her gastroenterologist for ruling out reoccurrence of small bowel   obstruction.  Dr. Richards from gastroenterology has been consulted for further   evaluation.  At this point in time, I do not see any need for surgical   intervention or consultation given her x-ray is normal.  We would recommend   initiation of a clear liquid diet and advancing this as tolerated by the   patient.  We will continue with gentle IV fluid hydration.  Symptomatic   control with nausea and pain regimen will be initiated.  Given her diffuse   tenderness to palpation and underlying diarrhea, this is concerning for   colitis.  Recommend further evaluation with Clostridium difficile PCR on the    stool sample and obtaining a CAT scan of the abdomen and pelvis with contrast.  2.  Diarrhea.  Recommend ruling out Clostridium difficile if the Clostridium   difficile PCR has been ordered.  3.  Suspected pulmonary edema versus pneumonia.  Given abnormal x-ray findings   with history  of cough, recommend further evaluation with a contrast CT of   the chest given her history of prior fungal pneumonia and immunosuppressed   state.  At this point, we would recommend initiation of intravenous   doxycycline.  Based on the results of the CAT scan, she may benefit from   pulmonology/infectious disease consultation.  4.  History of liver transplantation.  Recommend continuation of her baseline   immunosuppression.  I have requested a pharmacy consultation for monitoring of   her tacrolimus level,  5.  Pulmonary hypertension.  Recommend ongoing use of her baseline therapy and   outpatient followup with cardiology.  6.  Immunosuppressed status.  7.  Chronic anemia.  Recommend ongoing monitoring of hemoglobin in practicing   restrictive transfusion strategy.  8.  Thrombocytopenia, chronic.  Recommend ongoing monitoring.  9.  Diabetes mellitus type 2.  At this point in time, given her restricted   intake, recommend sliding scale insulin.  10.  Chronic kidney disease stage II.  Avoid nephrotoxin and dose medications   renally and monitor her renal function.  11.  Anxiety and depression.  Continue her baseline regimen.  12.  Hypothyroidism.  Continue her baseline regimen of Synthroid.  TSH level   is within normal limits.  13.  Hyperlipidemia.  Continue her baseline regimen.  14.  Gastroesophageal reflux disease.  Withholding her proton pump inhibitor   therapy given presentation concerning for Clostridium difficile diarrhea.  15.  Chronic hypoxemic respiratory failure.  She remains at her baseline   oxygen requirement.  16.  Aortic stenosis.  Recommend outpatient followup with cardiology.  17.  Preventive prophylaxis.  Deep  venous thrombosis preventive prophylaxis   with sequential compression devices and subcutaneous heparin has been ordered.    Stress ulcer prophylaxes are not indicated.  18.  Code status.  Patient was admitted to the hospital under a full code   status.       ____________________________________     MD RONALD RUIZ / SAVAGE    DD:  01/16/2017 18:04:47  DT:  01/16/2017 23:09:06    D#:  [id number]  Job#:  [id number]

## 2017-01-17 NOTE — CARE PLAN
Problem: Safety  Goal: Will remain free from injury  Updated about POC. Reinforce call light use. Pt acknowledged understanding    Problem: Infection  Goal: Will remain free from infection  Iv abx started. Lactic acid WNL. Am labs ordered    Problem: Bowel/Gastric:  Goal: Normal bowel function is maintained or improved  Denies flatus or bm. abd soft. +BS.

## 2017-01-17 NOTE — PROGRESS NOTES
Assumed care at 1845. Pt resting back from CT. Ambulating with standby assist. A&ox 4. Tolerating diet. Voided 100cc since st.cath @ 1800. Will bladderscan at midnight. Pain well controlled. O2 @ 4-6LNC. Uses home O2. No bm for days per pt. Pt refused bed alarm despite education. Calls appropriately. Call light within reach. Hourly rounding in place

## 2017-01-17 NOTE — CONSULTS
"DATE OF SERVICE:  01/16/2017    HISTORY OF PRESENT ILLNESS:  The patient is a 56-year-old female who presented   to my office today for followup after recent hospital visit and subsequent ER   visit for small bowel follow through, and subsequent obstipation.  On January 5th, she presented to the emergency room with abdominal pain and nausea, with   a CT scan demonstrating \"small bowel dilatation with evidence of bowel feces   in the proximal jejunum, transition point in the left upper quadrant   suggestive of at least a partial small-bowel obstruction.\"  Patient was   treated with NG suction and other conservative measures, and had resolution   without need for surgery.  On January 12th, the patient again presented with   complaints of inability to have a bowel movements, as well as ongoing nausea,   as well as vomiting.  A KUB at that point demonstrated two accumulations but   no small-bowel obstruction.  The patient was treated medically, and   discharged.  She has been treated with various laxatives, as well as receiving   a single dose of _____. and a 4 L GoLYTELY.  She tells me that her last   formed bowel movement was about 10 days ago.  Otherwise, she is having small   liquid stools up to 10 times per day.  She reports an 8 pound weight gain in   the past 2 weeks.  She reports feeling dizzy and lightheaded.  She reports   prior to the beginning of the year that her bowel movements were completely   normal.  She has not changed her narcotic regimen (she currently takes   oxycodone 10 mg t.i.d.), nor has she changed her medical regimen generally   speaking.  Her diet for the past 10 days or so, has been primarily soaps and   other liquids.    PAST MEDICAL HISTORY:  Significant for status post liver transplant, had a   pulmonary disorder, history of multiple myeloma, anxiety, anemia,   pancreatitis, type 2 diabetes, chronic pain, sarcoid, hypothyroidism.    PAST SURGICAL HISTORY:  Includes orthotopic liver " transplant in 2011, she has   had a gastric sleeve in December 2016 for obesity.  Her last colonoscopy and   EGD were in 2013.  She has had hemorrhoidectomy, she has had breast reduction,   carpal tunnel surgery, sinus surgery, hysterectomy, and cholecystectomy.    MEDICATIONS:  Gabapentin, ondansetron, sertraline, Humalog, aspirin, Adcirca,   furosemide , MiraLax, omeprazole, Senokot, cyclobenzaprine, oxygen 6 liters   24/7, Prograf, trazodone, CellCept, Letairis, prednisone, probiotic, vitamin   C, oxycodone, fluticasone, Citrucel , Lantus, levothyroxine, pravastatin,   Spiriva, Xanax, Colace, Systane.    ALLERGIES:  TO RHUBARB AND VANCOMYCIN.    SOCIAL HISTORY:  Negative for tobacco or alcohol.    REVIEW OF SYSTEMS:  Significant and feeling dizzy, lightheaded, nausea,   vomiting and watery diarrhea 10 times a day.  She denies any fevers or chills,   any chest pain or shortness of breath, no abdominal pain, no dysuria or   hematuria.  No lower extremity swelling, numbness, tingling, or weakness.  No   blood in the stools.    PHYSICAL EXAMINATION:  GENERAL:  He is a well-developed, well-nourished, appears nauseated.  She is   on O2 by nasal cannula.  HEENT:  Sclerae are anicteric.  LUNGS:  Clear to auscultation bilaterally.  HEART:  Regular in rate of approximately 100 with normal rhythm.  ABDOMEN:  Soft, nontender, nondistended.  Bowel sounds are quite active and   normal.  No hepatomegaly or splenomegaly.  EXTREMITIES:  No rashes or edema present.  Pulses are 2+ bilaterally.    ASSESSMENT AND PLAN:  1.  Obstipation.  2.  Nausea, vomiting.  3.  Hypotension.  4.  Dehydration.  5.  Diabetes.  6.  Liver transplant recipient.  7.  Pulmonary hypertension.  8.  Chronic pain.  9.  Hypothyroidism.    DISCUSSION:  The patient is a 56-year-old female with multiple complex medical   issues, and now with ongoing problems with nausea, vomiting and diarrhea as   above.  These symptoms have all been fairly recent onset beginning  at the   beginning of the year.  She did have findings on CT scan on her initial   admission on the 5th of January with partial SBO, and subsequently on her ER   visit with fecal impaction/constipation.  She continues to have symptoms,   which may suggest either ongoing impaction or possibly even SBO.  She is   currently dehydrated and orthostatic here in the office.  Given the active   nausea, vomiting, she will not be able to rehydrate at home by mouth, and will   therefore require hospitalization for this.  When she is rehydrated, then we   would plan on a more aggressive laxative regimen, assuming that no SBO was   actually determined to be present on imaging studies.  I did speak to the   hospitalist on call, and he has graciously agreed to admit the patient and   implement the above plan of rehydration, ruling out a SBO, and treatment with   laxatives as appropriate.  I have contacted by my partner Dr. Aravind Richards to   provide GI assistance while the patient is in the hospital.    Thank you for allowing me to participate in the care of this patient.       ____________________________________     YAYA BILLINGS MD    WP / NTS    DD:  01/16/2017 17:23:56  DT:  01/16/2017 21:44:51    D#:  [id number]  Job#:  [id number]

## 2017-01-17 NOTE — PROGRESS NOTES
"RN was requested to go into patient room to give Ativan, upon entering patient was yelling at GI MD. MD was attempting to have discussion with patient about GI problems, nausea, vomiting, headache, abdominal pain, ambulation and sitting in chiar and narcotic use. Patient informed MD and RN that she has been using narcotics for 10 years and has never had a problem. Patient then yelled at RN that \"this is none of her business\", MD stated it is very much her business she is caring for you. MD informed patient that we are all caring for her and that she needs to not shut down and become aggressive when trying to have a discussion. Patient refused to have discussion with MD and yelled at him that she \"will be telling Dr. Matt\". Patient then requested that RN call [place] and have MD speak to him. RN explained that she can have her MD contact the physician but that RN can not. MD orders were reviewed with patient, all questions answered. Patient then apologized to RN for yelling at me \"stating you just walked in at the wrong time\". Patient refusing to get up to chair, walk in the hallway, stating \"im in to much pain. Patient also requested to see X-ray stating that she does not believe she had pneumonia, RN reviewed x-ray results with her. Patient asked if she could be put on PO antibiotics and sent home, RN informed patient she would need to discuss with MD. /57 mmHg  Pulse 73  Temp(Src) 36.6 °C (97.8 °F)  Resp 17  Ht 1.727 m (5' 8\")  Wt 81.647 kg (180 lb)  BMI 27.38 kg/m2  SpO2 94%  LMP 01/03/2000  Breastfeeding? No    "

## 2017-01-17 NOTE — PROGRESS NOTES
"Pharmacy Kinetics 56 y.o. female on vancomycin day # 1 2017    Currently on Vancomycin 2000 mg loading dose followed by 1200 mg iv q12hr    Indication for Treatment: HCAP    Pertinent history per medical record: Admitted on 2017 for abdominal pain, intractable nausea and vomiting.  This patient was recently admitted to the hospital on 1/3/17 with a small bowel obstruction.  Due to concerns for a recurrence of her SBO, she presents back to the hospital today for evaluation.  A chest x-ray was done upon admission showing interstitial opacities in both lungs consistent with edema or pneumonitis.  Empiric antibiotics were started for HCAP.  Also of note this patient has a history of a liver transplant and gastric sleeve surgery.      Other antibiotics: doxycycline 100 mg iv Q12H, Zosyn 3.375 gm iv Q6H    Allergies: Rhubarb and Vancomycin hcl     List concerns for renal function: contrast on 17    Pertinent cultures to date:   17 blood, peripheral x 2: NGTD    Recent Labs      17   1411  17   0207   WBC  3.5*  9.1  6.2   NEUTSPOLYS   --   88.60*   --    BANDSSTABS   --   3.50   --      Recent Labs      17   1411  17   0207   BUN  15  16  14   CREATININE  1.16  1.06  0.96   ALBUMIN  4.0  3.6  3.4     No results for input(s): VANCOTROUGH, VANCOPEAK, VANCORANDOM in the last 72 hours.  Intake/Output Summary (Last 24 hours) at 17 1052  Last data filed at 17 0800   Gross per 24 hour   Intake   3375 ml   Output   2900 ml   Net    475 ml      Blood pressure 112/57, pulse 73, temperature 36.6 °C (97.8 °F), resp. rate 17, height 1.727 m (5' 8\"), weight 81.647 kg (180 lb), last menstrual period 2000, SpO2 94 %, not currently breastfeeding. Temp (24hrs), Av.7 °C (98.1 °F), Min:36.3 °C (97.4 °F), Max:37.2 °C (99 °F)      A/P   1. Vancomycin dose change: not indicated at this time  2. Next vancomycin level: 1400 tomorrow  3. Goal " trough: 16-20 mcg/mL  4. Comments: The vancomycin loading dose was not hung until 0300 this morning.  I adjusted the vancomycin dosing times and re-scheduled the level to be prior to the 4th total dose tomorrow.    Rosa Isela MendozaD.

## 2017-01-17 NOTE — PROGRESS NOTES
PNA evident on imaging.   Given immunosuppressed status, recent hospital stay likely HCAP.   Start Vanc / Zosyn. Continue doxycycline.     Pam Christiansen M.D.

## 2017-01-17 NOTE — PROGRESS NOTES
"Pharmacy Kinetics 56 y.o. female on vancomycin day # 1 2017    Currently on Vancomycin: Loading dose 25 mg/kg (2000 mg)    Indication for Treatment: Pneumonia    Pertinent history per medical record: Admitted on 2017 for HCAP and N/V .    Other antibiotics: Pip/tazo 3.375 q6h    Allergies: Rhubarb and Vancomycin hcl (Vanco allergy states Red Man syndrome if infused to fast)    List concerns for renal function: none    Pertinent cultures to date:   N/A    Recent Labs      17   1411   WBC  3.5*  9.1   NEUTSPOLYS   --   88.60*   BANDSSTABS   --   3.50     Recent Labs      17   1411   BUN  15  16   CREATININE  1.16  1.06   ALBUMIN  4.0  3.6     No results for input(s): VANCOTROUGH, VANCOPEAK, VANCORANDOM in the last 72 hours.  Intake/Output Summary (Last 24 hours) at 17 2328  Last data filed at 17 2300   Gross per 24 hour   Intake   3000 ml   Output   1050 ml   Net   1950 ml      Blood pressure 96/55, pulse 68, temperature 36.3 °C (97.4 °F), resp. rate 16, height 1.727 m (5' 8\"), weight 81.647 kg (180 lb), last menstrual period 2000, SpO2 95 %, not currently breastfeeding. Temp (24hrs), Av.7 °C (98.1 °F), Min:36.3 °C (97.4 °F), Max:37.2 °C (99 °F)      A/P   1. Vancomycin dose change: ordered 15mg/kg (1200 mg) q12 to begin  at 1130  2. Next vancomycin level:  at 2300   3. Goal trough: 16-20  4. Comments: Administered at lower rates to avoid Red Man Syndrome as listed in allergies.    Juan Sandhu, PharmD      "

## 2017-01-17 NOTE — CARE PLAN
Problem: Discharge Barriers/Planning  Goal: Patient’s continuum of care needs will be met  Intervention: Assess potential discharge barriers on admission and throughout hospital stay  Patient on IV antibiotics, not ambulating refusing

## 2017-01-17 NOTE — RESPIRATORY CARE
COPD EDUCATION by COPD CLINICAL EDUCATOR  1/17/2017 at 8:29 AM by Mattie العراقي     Patient reviewed by COPD education team. Patient does not qualify for COPD program.

## 2017-01-17 NOTE — CARE PLAN
Problem: Safety  Goal: Will remain free from falls  Intervention: Implement fall precautions  Refuses bed alarm despite education

## 2017-01-17 NOTE — PROGRESS NOTES
Gastroenterology Progress Note     Author: Aravind Richadrs   Date & Time Created: 1/17/2017 11:07 AM    Interval History:  Patient reports continued abdominal pain.   Reports nausea and vomiting, but upon further questioning she is exhibiting reflux symptoms.   Having loose watery stools.   No fevers.   She doubts diagnosis of PNA.   She believes that she has a complete bowel obstruction.   She is bothered by the pain medication regimen and has been refusing dilaudid.     Review of Systems:  Review of Systems   Constitutional: Positive for malaise/fatigue.   Eyes: Negative.    Respiratory: Negative.    Cardiovascular: Negative.    Gastrointestinal: Positive for heartburn.       Physical Exam:  Physical Exam   Constitutional: She is oriented to person, place, and time. She appears well-developed and well-nourished.   Pulmonary/Chest: Effort normal and breath sounds normal.   Abdominal: Soft. Bowel sounds are normal.   Neurological: She is alert and oriented to person, place, and time.   Psychiatric: She has a normal mood and affect. Her behavior is normal. Judgment and thought content normal.       Labs:        Invalid input(s): [id number]      Recent Labs      01/14/17 1948 01/16/17   1411 01/17/17   0207   SODIUM  139  140  139   POTASSIUM  4.2  4.1  3.8   CHLORIDE  107  102  105   CO2  28  32  31   BUN  15  16  14   CREATININE  1.16  1.06  0.96   MAGNESIUM   --   2.0  1.8   PHOSPHORUS   --   3.2  2.4*   CALCIUM  9.2  8.8  8.8     Recent Labs      01/14/17 1948 01/16/17   1411  01/17/17   0207   ALTSGPT  10  22  18   ASTSGOT  16  25  17   ALKPHOSPHAT  31  22*  26*   TBILIRUBIN  0.2  0.4  0.4   GLUCOSE  221*  165*  109*     Recent Labs      01/14/17 1948 01/16/17   1411 01/17/17   0207   RBC  4.01*  3.74*  3.70*   HEMOGLOBIN  11.3*  10.4*  10.7*   HEMATOCRIT  35.4*  33.2*  33.3*   PLATELETCT  89*  59*  61*   PROTHROMBTM   --   14.4   --    APTT   --   30.0   --    INR   --   1.09   --      Recent Labs       17   1948  17   1411  17   0207   WBC  3.5*  9.1  6.2   NEUTSPOLYS   --   88.60*   --    LYMPHOCYTES   --   5.30*   --    MONOCYTES   --   2.60   --    EOSINOPHILS   --   0.00   --    BASOPHILS   --   0.00   --    ASTSGOT  16  25  17   ALTSGPT  10  22  18   ALKPHOSPHAT  31  22*  26*   TBILIRUBIN  0.2  0.4  0.4     Hemodynamics:  Temp (24hrs), Av.7 °C (98.1 °F), Min:36.3 °C (97.4 °F), Max:37.2 °C (99 °F)  Temperature: 36.6 °C (97.8 °F)  Pulse  Av.1  Min: 62  Max: 86   Blood Pressure: 112/57 mmHg     Respiratory:    Respiration: 17, Pulse Oximetry: 94 %     Work Of Breathing / Effort: Mild  RUL Breath Sounds: Diminished, RML Breath Sounds: Diminished, RLL Breath Sounds: Diminished, MANJINDER Breath Sounds: Diminished, LLL Breath Sounds: Diminished  Fluids:    Intake/Output Summary (Last 24 hours) at 17 1107  Last data filed at 17 0800   Gross per 24 hour   Intake   3375 ml   Output   2900 ml   Net    475 ml        GI/Nutrition:  Orders Placed This Encounter   Procedures   • DIET ORDER     Standing Status: Standing      Number of Occurrences: 1      Standing Expiration Date:      Order Specific Question:  Diet:     Answer:  Clear Liquid [10]     Medical Decision Making, by Problem:  Active Hospital Problems    Diagnosis   • Nausea & vomiting [R11.2]   • HCAP (healthcare-associated pneumonia) [J18.9]       Plan:  1. Abdominal pain and constipation:  A coherent discussion regarding ways to treat her abdominal pain and constipation was attempted. Extensive explanatiosn regarding ways to improve her abdominal pain, constipation, and risks of narcotics on these issues was performed.  Every explanation was met with significant resistance. She was very derogatory to the current nurse and the past physicians that have provided care for her.  Every attempt at trying to find some area of common ground was quickly met with a change in preference. Ultimately I would recommend a multi-pronged  approach to treat her abdominal pain and constipation.  Increase fluid intake and sitting upright afterwards as to prevent reflux which she interprets as vomiting, starting a diet in food based fiber (vegetables and fruit) with avoidance of fiber supplements, understanding the constipation risks of increasing narcotic use and attempting to wean away the dose, utilizing non narcotic options for abdominal pain, ambulating to aid in GI motility, avoidance of constant bed rest, amongst others were reiterated to the patient. After discussion with Dr. Matt, he informed me of some possible tendency to abusive behavior pattern with alcohol in the past, which could translate to narcotic currently.   2. History of Liver transplantation: Started on steroids for immunosuppression under the recommendations of Dr. Tenorio. As soon as she can tolerate orals, start oral immunosuppressants.   3. HCAP: On antibiotics.   4. SBO: No evidence of this.   5. GERD. Antireflux measures discussed.       Core Measures

## 2017-01-18 LAB — PROCALCITONIN SERPL-MCNC: 5.96 NG/ML

## 2017-01-18 NOTE — DISCHARGE SUMMARY
DATE OF ADMISSION:  01/16/2017    DATE OF DISCHARGE:  01/17/2017    ADMISSION DIAGNOSES:  1.  Abdominal pain with intractable vomiting.  2.  Diarrhea, rule out Clostridium difficile.  3.  Suspected aspiration pneumonia versus pulmonary edema.  4.  History of liver transplant.  5.  Pulmonary hypertension.  6.  Chronic respiratory failure.  7.  Chronic anemia.  8.  Chronic thrombocytopenia.  9.  Chronic kidney disease, stage II.  10.  Diabetes mellitus.  11.  Anxiety and depression.  12.  Hypothyroidism.  13.  History of liver transplant, on chronic immunosuppressant therapy.  14.  History of cirrhosis attributed sarcoidosis.  15.  History of gastric sleeve surgery in December 2015.  16.  Chronic bronchiolitis per records, is to follow up outpatient with   pulmonary.  17.  History of aortic stenosis.    DISCHARGE DIAGNOSES:  1.  Aspiration pneumonia, asymptomatic.  2.  Hypotension resolved with dehydration corrected.  3.  Anemia and thrombocytopenia, chronic.  4.  Nausea and vomiting, resolved.  5.  Obstipation, resolved.  6.  Chronic pain.    CONSULT:  Dr. Osman Matt, GI.    IMAGING TESTS:  Patient had a CT scan of the chest, abdomen, and pelvis on   01/16/2017, revealing new right-sided pulmonary infiltrates, right upper and   right lower lobe, persistent splenomegaly, no evidence of bowel dilation,   possible mild circumferential rectal wall thickening due to under distention   versus proctitis, anasarca.    An x-ray of the abdomen on 01/16/2017, revealed no evidence of bowel   obstruction.    HOSPITAL COURSE:  Patient is a 56-year-old female with history of diabetes   mellitus type 2, monoclonal gammopathy, chronic kidney disease stage II,   hypertension, depression, cirrhosis attributed to sarcoidosis, post-liver   transplant in 2011, hypertension, chronic bronchiolitis, hypothyroidism,   chronic respiratory failure, O2 dependent, 4-6 L nasal cannula, GERD,   splenomegaly, aortic stenosis, chronic pain, gastric  sleeve surgery in   December 2015; was admitted with symptoms of abdominal pain, intractable   nausea and vomiting.  She was hypotensive, systolic blood pressure in the   90/50.  She did not have any acute respiratory symptoms of cough and shortness   of breath.  Her O2 requirements were at baseline.  There is no wheeze.    Patient did have vomiting which resolved.  Imaging tests suggested pneumonia,   right-sided.  Patient following day had no further nausea or vomiting.  She   was requesting narcotics including IV opiates.  She despite feeling better,   was advised to try to cut down on her narcotics given risk for functional   dysmotility, also advised to increase activity.  She had negative blood   cultures MRSA, PCR was negative, was afebrile, no increased cough, shortness   breath, or wheezing.  I suspect her new infiltrates likely from aspiration   pneumonia attributed to her vomiting.  She tolerated her diet that was   advanced to a GI soft, was anxious to go home, passing gas.  Abdominal exam   was benign.  I did discuss the case with Dr. Richards with planned outpatient   followup with Dr. Matt her GI doctor as well as her pulmonologist.    DISCHARGE INSTRUCTIONS:  Discharge home.    DIET:  ADA 1800 kilocalorie as tolerated.    DISCHARGE MEDICATIONS:  1.  Augmentin 875/125 one tab b.i.d. for additional 8 days.  2.  Zofran 4 q. 8 hours p.r.n. nausea and vomiting.  3.  Continue with Adcirca 40 mg at bedtime.  4.  Xanax 0.5 t.i.d. as needed for anxiety.  5.  Letairis 10 mg daily.  6.  Aspirin 81 mg daily.  7.  CellCept 500 b.i.d.  8.  Citracal 2 tabs twice a day.  9.  Flexeril 10 t.i.d. as needed for muscle spasms.  10.  Colace 100 mg b.i.d.  11.  Lasix 40 mg daily, hold if not taking orals/fluids.  12.  Neurontin 600 mg t.i.d.  13.  Dilaudid 8 mg t.i.d. as needed for severe pain with a goal to cut to 4 mg   every 8 hours as needed.  14.  Lantus as prescribed.  15.  Insulin Humalog 2 units 3 times a day before  meals.  16.  Synthroid 137 mcg daily.  17.  Prilosec 40 daily.  18.  Oxycodone 10-30 mg q. 6 hours p.r.n. severe breakthrough pain.  19.  Prednisone 10 mg daily.  20.  Pravachol 20 daily.  21.  Probiotic daily.  22.  Senokot 7.2 mg twice a day.  23.  Zoloft 100 daily.  24.  Prograf takes 2 mg a.m., 1.5 p.m.  25.  Trazodone  mg at bedtime as needed for sleep.    Follow up with Dr. Matt approximately 2 weeks, Dr. Bernarda Damon in 1   week, her GI doctor at [place] within 2-3 weeks.  Her pulmonologist as   scheduled ____.    Total discharge time approximately 40 minutes.       ____________________________________     MD ANGELITA CRENSHAWBV / SAVAGE    DD:  01/17/2017 19:23:53  DT:  01/17/2017 21:55:05    D#:  [id number]  Job#:  [id number]

## 2017-01-20 ENCOUNTER — OFFICE VISIT (OUTPATIENT)
Dept: MEDICAL GROUP | Facility: PHYSICIAN GROUP | Age: 57
End: 2017-01-20
Payer: MEDICARE

## 2017-01-20 VITALS
TEMPERATURE: 98.4 F | DIASTOLIC BLOOD PRESSURE: 62 MMHG | BODY MASS INDEX: 25.91 KG/M2 | SYSTOLIC BLOOD PRESSURE: 110 MMHG | OXYGEN SATURATION: 92 % | HEART RATE: 86 BPM | HEIGHT: 68 IN | WEIGHT: 171 LBS | RESPIRATION RATE: 16 BRPM

## 2017-01-20 DIAGNOSIS — E61.1 IRON DEFICIENCY: ICD-10-CM

## 2017-01-20 DIAGNOSIS — D47.2 MGUS (MONOCLONAL GAMMOPATHY OF UNKNOWN SIGNIFICANCE): ICD-10-CM

## 2017-01-20 DIAGNOSIS — D86.9 SARCOIDOSIS: ICD-10-CM

## 2017-01-20 DIAGNOSIS — K21.00 GASTROESOPHAGEAL REFLUX DISEASE WITH ESOPHAGITIS: ICD-10-CM

## 2017-01-20 DIAGNOSIS — E03.9 HYPOTHYROIDISM, ADULT: ICD-10-CM

## 2017-01-20 DIAGNOSIS — Z79.891 LONG TERM PRESCRIPTION OPIATE USE: ICD-10-CM

## 2017-01-20 DIAGNOSIS — G89.4 CHRONIC PAIN SYNDROME: ICD-10-CM

## 2017-01-20 DIAGNOSIS — M54.89 OTHER BACK PAIN: ICD-10-CM

## 2017-01-20 DIAGNOSIS — J96.11 CHRONIC RESPIRATORY FAILURE WITH HYPOXIA (HCC): ICD-10-CM

## 2017-01-20 DIAGNOSIS — R11.0 NAUSEA: ICD-10-CM

## 2017-01-20 PROBLEM — J18.9 HCAP (HEALTHCARE-ASSOCIATED PNEUMONIA): Status: RESOLVED | Noted: 2017-01-16 | Resolved: 2017-01-20

## 2017-01-20 PROCEDURE — 99214 OFFICE O/P EST MOD 30 MIN: CPT | Performed by: FAMILY MEDICINE

## 2017-01-20 RX ORDER — ALPRAZOLAM 0.5 MG/1
0.5 TABLET ORAL 3 TIMES DAILY
Qty: 90 TAB | Refills: 2 | Status: SHIPPED | OUTPATIENT
Start: 2017-01-20 | End: 2017-04-12 | Stop reason: SDUPTHER

## 2017-01-20 RX ORDER — ONDANSETRON 4 MG/1
4 TABLET, ORALLY DISINTEGRATING ORAL EVERY 8 HOURS PRN
Qty: 270 TAB | Refills: 4 | Status: SHIPPED | OUTPATIENT
Start: 2017-01-20 | End: 2018-10-28

## 2017-01-20 RX ORDER — GABAPENTIN 600 MG/1
600 TABLET ORAL 3 TIMES DAILY
Qty: 270 TAB | Refills: 4 | Status: ON HOLD | OUTPATIENT
Start: 2017-01-20 | End: 2017-10-31

## 2017-01-20 RX ORDER — FLUTICASONE PROPIONATE 50 MCG
1 SPRAY, SUSPENSION (ML) NASAL DAILY
Qty: 16 G | Refills: 10 | Status: SHIPPED | OUTPATIENT
Start: 2017-01-20 | End: 2017-08-02

## 2017-01-20 RX ORDER — LEVOTHYROXINE SODIUM 137 UG/1
137 TABLET ORAL
Qty: 90 TAB | Refills: 4 | Status: SHIPPED | OUTPATIENT
Start: 2017-01-20 | End: 2017-10-03

## 2017-01-20 RX ORDER — NYSTATIN 100000 [USP'U]/G
1 POWDER TOPICAL 3 TIMES DAILY
Qty: 45 G | Refills: 10 | Status: SHIPPED | OUTPATIENT
Start: 2017-01-20 | End: 2017-03-19

## 2017-01-20 RX ORDER — TIOTROPIUM BROMIDE 18 UG/1
18 CAPSULE ORAL; RESPIRATORY (INHALATION) DAILY
Qty: 90 CAP | Refills: 4 | Status: SHIPPED | OUTPATIENT
Start: 2017-01-20 | End: 2018-03-17 | Stop reason: SDUPTHER

## 2017-01-20 RX ORDER — LANOLIN ALCOHOL/MO/W.PET/CERES
325 CREAM (GRAM) TOPICAL 2 TIMES DAILY
Qty: 180 TAB | Refills: 4 | Status: ON HOLD | OUTPATIENT
Start: 2017-01-20 | End: 2017-03-27

## 2017-01-20 RX ORDER — OMEPRAZOLE 40 MG/1
40 CAPSULE, DELAYED RELEASE ORAL DAILY
Qty: 90 CAP | Refills: 4 | Status: SHIPPED | OUTPATIENT
Start: 2017-01-20 | End: 2017-12-29 | Stop reason: SDUPTHER

## 2017-01-20 RX ORDER — CYCLOBENZAPRINE HCL 10 MG
10 TABLET ORAL 2 TIMES DAILY PRN
Qty: 90 TAB | Refills: 4 | Status: SHIPPED | OUTPATIENT
Start: 2017-01-20 | End: 2017-06-22

## 2017-01-20 RX ORDER — AMOXICILLIN AND CLAVULANATE POTASSIUM 875; 125 MG/1; MG/1
1 TABLET, FILM COATED ORAL 2 TIMES DAILY
Qty: 16 TAB | Refills: 0 | Status: SHIPPED | OUTPATIENT
Start: 2017-01-20 | End: 2017-01-28

## 2017-01-20 RX ORDER — TRAZODONE HYDROCHLORIDE 50 MG/1
50-100 TABLET ORAL
Qty: 90 TAB | Refills: 4 | Status: SHIPPED | OUTPATIENT
Start: 2017-01-20 | End: 2017-08-02

## 2017-01-20 RX ORDER — OXYCODONE HYDROCHLORIDE 10 MG/1
10-30 TABLET ORAL EVERY 6 HOURS PRN
Qty: 90 TAB | Refills: 0 | Status: SHIPPED | OUTPATIENT
Start: 2017-01-20 | End: 2017-01-20 | Stop reason: SDUPTHER

## 2017-01-20 RX ORDER — OXYCODONE HYDROCHLORIDE 10 MG/1
10-30 TABLET ORAL EVERY 6 HOURS PRN
Qty: 90 TAB | Refills: 0 | Status: SHIPPED | OUTPATIENT
Start: 2017-01-20 | End: 2017-04-12 | Stop reason: SDUPTHER

## 2017-01-20 RX ORDER — PRENATAL VIT 75/IRON/FOLIC/OM3 28-800-440
COMBINATION PACKAGE (EA) ORAL
Qty: 120 TAB | Refills: 0 | Status: SHIPPED | OUTPATIENT
Start: 2017-01-20 | End: 2017-03-31 | Stop reason: SDUPTHER

## 2017-01-20 NOTE — MR AVS SNAPSHOT
"        Roxana Cuba   2017 1:20 PM   Office Visit   MRN: [id number]    Department:  [facility]   Dept Phone:  [phone]    Description:  Female : 1960   Provider:  Bernarda Reyes D.O.           Reason for Visit     Establish Care           Allergies as of 2017     Allergen Noted Reactions    Rhubarb 2009   Anaphylaxis    Vancomycin Hcl 2016       Causes red man syndrome when infused to fast        You were diagnosed with     Iron deficiency   [731637]       Chronic pain syndrome   [338.4.ICD-9-CM]       Hypothyroidism, adult   [352786]       Gastroesophageal reflux disease with esophagitis   [6008898]       Nausea   [438199]       Personal history of gastric banding   [050192]       Other back pain   [9884263]       Chronic respiratory failure with hypoxia (CMS-HCC)   [222623]         Vital Signs     Blood Pressure Pulse Temperature Respirations Height Weight    110/62 mmHg 86 36.9 °C (98.4 °F) 16 1.727 m (5' 7.99\") 77.565 kg (171 lb)    Body Mass Index Oxygen Saturation Last Menstrual Period Smoking Status          26.01 kg/m2 92% 2000 Passive Smoke Exposure - Never Smoker        Basic Information     Date Of Birth Sex Race Ethnicity Preferred Language    1960 Female White Non- English      Your appointments     2017  8:15 AM   ECHO with ECHO [facility]   ECHOCARDIOLOGY [facility])    [address]  [place] [address]   [phone]           No prep            2017 12:30 PM   MA DX30 with S LENA MG 1   [facility] MAMMOGRAPHY ([place])    [address]  [place] [address]   [phone]            Feb 15, 2017  8:00 AM   Adult Draw/Collection with [facility]   [facility] (--)    [address]  [place] [address]   [phone]            Mar 08, 2017  8:00 AM   Adult Draw/Collection with LAB [facility]   [facility] (--)    [address]  [place] [address]   [phone]            , " 2017 10:30 AM   FOLLOW UP with Maxwell Phan M.D.   [facility] (--)    [address]  [place] [address] [phone]            Apr 12, 2017 10:00 AM   Established Patient with Bernarda Reyes D.O.   [facility] [place]    [address], [address]  [place] [address] [phone]           You will be receiving a confirmation call a few days before your appointment from our automated call confirmation system.              Problem List              ICD-10-CM Priority Class Noted - Resolved    Status post liver transplant Dr. [place] ([facility]) Z94.4 Medium  3/13/2012 - Present    Hypothyroidism, adult E03.9 Low  3/13/2012 - Present    History of pancreatitis Z87.19 Medium  6/20/2012 - Present    H/O non-ST elevation myocardial infarction (NSTEMI) I25.2   5/16/2013 - Present    Lower extremity weakness M62.81 Low  6/26/2013 - Present    Anemia D64.9 Medium  9/13/2013 - Present    Anxiety F41.9 Low  11/4/2013 - Present    Insomnia G47.00 Low  11/4/2013 - Present    Sarcoidosis (CMS-HCC) D86.9 Medium  11/14/2013 - Present    Diabetes mellitus, type 2 (CMS-HCC) E11.9 Medium  11/14/2013 - Present    COPD (chronic obstructive pulmonary disease) (CMS-HCC) J44.9 High  11/14/2013 - Present    MGUS (monoclonal gammopathy of unknown significance) D47.2 Medium  11/14/2013 - Present    CKD (chronic kidney disease) stage 3, GFR 30-59 ml/min N18.3 Medium  2/25/2014 - Present    Hepatopulmonary syndrome (CMS-[facility]) K76.81 Medium  3/5/2014 - Present    Ostium secundum type atrial septal defect, S/P percutaneous closure Q21.1 Medium  3/17/2014 - Present    Mild aortic regurgitation and aortic valve sclerosis I35.1 High  3/17/2014 - Present    Vitamin D deficiency E55.9 Medium  8/26/2014 - Present    Chronic respiratory failure (CMS-HCC) J96.10 Medium  11/13/2014 - Present    Pure hypercholesterolemia E78.00 High  12/9/2014 - Present     Pulmonary hypertension (CMS-HCC) I27.2 High  2/3/2015 - Present    Essential hypertension, benign I10 Low  2/3/2015 - Present    On prednisone therapy Z79.52   7/6/2015 - Present    Mild mitral regurgitation I34.0   7/14/2015 - Present    Splenomegaly R16.1   7/14/2015 - Present    Immunocompromised state (CMS-HCC) D84.9   11/14/2015 - Present    Splenic lesion D73.9   11/14/2015 - Present    Personal history of gastric banding Z98.84   1/6/2016 - Present    Back pain M54.9   2/22/2016 - Present    Long term prescription benzodiazepine use Z79.899   4/12/2016 - Present    Chronic pain syndrome G89.4   6/15/2016 - Present    Other allergic rhinitis J30.89   7/22/2016 - Present    Long term prescription opiate use Z79.891   7/26/2016 - Present    Thrombocytopenia (CMS-HCC) D69.6   8/15/2016 - Present    History of lung biopsy Z98.890   10/17/2016 - Present    Chronic obstructive pulmonary disease (CMS-HCC) J44.9   10/18/2016 - Present    SBO (small bowel obstruction) (CMS-HCC) K56.69   1/3/2017 - Present    GERD (gastroesophageal reflux disease) K21.9   1/4/2017 - Present    Nausea & vomiting R11.2   1/16/2017 - Present    Iron deficiency E61.1   1/20/2017 - Present      Health Maintenance        Date Due Completion Dates    IMM HEP B VACCINE (1 of 3 - Primary Series) 1960 ---    DIABETES MONOFILAMENT / LE EXAM 1960 ---    IMM DTaP/Tdap/Td Vaccine (1 - Tdap) 2/12/1979 ---    IMM INFLUENZA (1) 9/1/2016 9/28/2015, 10/14/2014, 9/16/2013, 9/27/2012, 10/1/2011    RETINAL SCREENING 4/1/2017 4/1/2016, 1/12/2015    A1C SCREENING 7/11/2017 1/11/2017, 10/19/2016, 6/3/2016, 3/2/2016, 10/16/2015, 6/2/2015, 5/19/2015, 5/6/2014, 4/1/2014, 11/14/2013, 3/26/2013, 3/22/2013, 3/5/2013, 9/12/2012, 5/20/2012, 4/9/2012, 3/19/2012, 2/12/2012, 2/5/2012    FASTING LIPID PROFILE 11/23/2017 11/23/2016, 8/23/2016, 7/5/2016, 6/3/2016, 6/3/2016, 3/2/2016, 12/7/2015, 10/16/2015, 6/2/2015, 3/2/2015, 3/1/2015, 12/22/2014, 3/5/2014,  3/19/2012, 3/3/2010, 8/5/2009, 7/29/2009    MAMMOGRAM 12/13/2017 12/13/2016, 11/2/2015, 10/24/2014, 10/24/2014, 10/20/2014, 8/4/2013 (Done)    Override on 8/4/2013: Done ([facility])    URINE ACR / MICROALBUMIN 1/11/2018 1/11/2017, 3/19/2012    SERUM CREATININE 1/17/2018 1/17/2017, 1/16/2017, 1/14/2017, 1/11/2017, 1/11/2017, 1/11/2017, 1/5/2017, 1/4/2017, 1/3/2017, 12/21/2016, 12/7/2016, 11/23/2016, 11/23/2016, 10/26/2016, 10/19/2016, 10/19/2016, 10/12/2016, 10/7/2016, 9/6/2016, 9/5/2016, 9/5/2016, 9/4/2016, 9/3/2016, 9/2/2016, 9/2/2016, 9/1/2016, 8/23/2016, 8/23/2016, 8/20/2016, 8/16/2016, 8/15/2016, 8/14/2016, 8/2/2016, 7/22/2016, 7/19/2016, 7/18/2016, 7/17/2016, 7/16/2016, 7/15/2016, 7/14/2016, 7/13/2016, 7/12/2016, 7/11/2016, 7/10/2016, 7/5/2016, 7/5/2016, 6/15/2016, 6/14/2016, 6/3/2016, 6/3/2016, 6/3/2016, 5/24/2016, 5/23/2016, 5/14/2016, 5/11/2016, 5/10/2016, 5/9/2016, 5/8/2016, 5/7/2016, 5/6/2016, 5/5/2016, 5/4/2016, 5/3/2016, 5/2/2016, 5/1/2016, 4/30/2016, 4/29/2016, 4/6/2016, 4/6/2016, 3/2/2016, 3/2/2016, 3/2/2016, 2/26/2016, 2/25/2016, 2/24/2016, 2/23/2016, 2/22/2016, 2/19/2016, 2/3/2016, 1/5/2016, 12/7/2015, 12/1/2015, 11/30/2015, 11/24/2015, 11/17/2015, 11/16/2015, 11/15/2015, 11/13/2015, 11/5/2015, 10/16/2015, 10/7/2015, 9/8/2015, 8/25/2015, 8/21/2015, 8/19/2015, 8/12/2015, 7/28/2015, 6/30/2015, 6/16/2015, 6/2/2015, 5/19/2015, 5/5/2015, 4/21/2015, 4/7/2015, 3/22/2015, 3/18/2015, 3/17/2015, 3/12/2015, 3/9/2015, 3/4/2015, 3/2/2015, 3/1/2015, 2/27/2015, 2/17/2015, 2/3/2015, 1/20/2015, 1/6/2015, 12/22/2014, 12/19/2014, 12/9/2014, 11/25/2014, 11/18/2014, 11/17/2014, 11/16/2014, 11/14/2014, 11/13/2014, 11/10/2014, 10/28/2014, 10/14/2014, 9/29/2014, 9/16/2014, 9/2/2014, 8/19/2014, 8/5/2014, 7/21/2014, 7/8/2014, 6/24/2014, 6/16/2014, 6/11/2014, 6/3/2014, 5/20/2014, 5/6/2014, 5/4/2014, 5/2/2014, 4/28/2014, 4/27/2014, 4/25/2014, 4/22/2014, 3/21/2014, 3/17/2014, 3/5/2014, 3/4/2014, 2/25/2014, 2/24/2014,  2/22/2014, 2/18/2014, 1/27/2014, 1/15/2014, 1/7/2014, 1/7/2014, 1/4/2014, 1/3/2014, 12/9/2013, 11/23/2013, 11/22/2013, 11/21/2013, 11/20/2013, 11/18/2013, 11/15/2013, 11/14/2013, 11/13/2013, 11/4/2013, 10/28/2013, 10/4/2013, 10/1/2013, 9/30/2013, 9/29/2013, 9/27/2013, 9/19/2013, 9/18/2013, 9/16/2013, 9/15/2013, 9/14/2013, 9/12/2013, 9/3/2013, 8/16/2013, 8/6/2013, 8/2/2013, 8/1/2013, 7/30/2013, 7/29/2013, 7/28/2013, 7/27/2013, 7/26/2013, 7/25/2013, 7/2/2013, 6/30/2013, 6/29/2013, 6/27/2013    COLONOSCOPY 9/4/2023 9/4/2013 (Done), 9/20/2010    Override on 9/4/2013: Done (Dr. Matt)            Current Immunizations     13-VALENT PCV PREVNAR 8/21/2016  9:40 AM    Influenza TIV (IM) 9/28/2015, 10/14/2014, 9/16/2013 11:43 PM, 9/27/2012 11:57 PM, 10/1/2011    Pneumococcal Vaccine (UF)Historical Data 10/8/2009  4:25 PM    Pneumococcal polysaccharide vaccine (PPSV-23) 1/4/2017  6:09 AM, 7/30/2009      Below and/or attached are the medications your provider expects you to take. Review all of your home medications and newly ordered medications with your provider and/or pharmacist. Follow medication instructions as directed by your provider and/or pharmacist. Please keep your medication list with you and share with your provider. Update the information when medications are discontinued, doses are changed, or new medications (including over-the-counter products) are added; and carry medication information at all times in the event of emergency situations     Allergies:  RHUBARB - Anaphylaxis     VANCOMYCIN HCL - (reactions not documented)               Medications  Valid as of: January 20, 2017 -  2:27 PM    Generic Name Brand Name Tablet Size Instructions for use    ALPRAZolam (Tab) XANAX 0.5 MG Take 1 Tab by mouth 3 times a day.        Ambrisentan (Tab) LETAIRIS 10 MG Take 1 Tab by mouth every day.        Amoxicillin-Pot Clavulanate (Tab) AUGMENTIN 875-125 MG Take 1 Tab by mouth 2 times a day for 8 days.        Aspirin (Tablet  Delayed Response) ECOTRIN 81 MG Take 1 Tab by mouth every morning.        Calcium Citrate   Take 2 Tabs by mouth 2 Times a Day.        Cyclobenzaprine HCl (Tab) FLEXERIL 10 MG Take 1 Tab by mouth 2 times a day as needed.        Docusate Sodium (Cap) COLACE 100 MG Take 100 mg by mouth 2 times a day.        Ferrous Sulfate (Tablet Delayed Response) ferrous sulfate 325 (65 FE) MG Take 1 Tab by mouth 2 times a day.        Fluticasone Propionate (Suspension) FLONASE 50 MCG/ACT Spray 1 Spray in nose every day.        Furosemide (Tab) LASIX 40 MG Take 40 mg by mouth every morning.        Gabapentin (Tab) NEURONTIN 600 MG Take 1 Tab by mouth 3 times a day.        HYDROmorphone HCl (Tab) DILAUDID 4 MG Take 8 mg by mouth 3 times a day as needed for Severe Pain.        Insulin Glargine (Solution) LANTUS 100 UNIT/ML Inject  as instructed 1 time daily as needed (adjust as needed).        Insulin Lispro (Solution) HUMALOG 100 UNIT/ML Inject  as instructed 3 times a day before meals. Per sliding scale        Levothyroxine Sodium (Tab) SYNTHROID 137 MCG Take 1 Tab by mouth every day.        Mycophenolate Mofetil (Cap) CELLCEPT 250 MG Take 500 mg by mouth 2 times a day.        Omeprazole (CAPSULE DELAYED RELEASE) PRILOSEC 40 MG Take 1 Cap by mouth every day.        Ondansetron (TABLET DISPERSIBLE) ZOFRAN ODT 4 MG Take 1 Tab by mouth every 8 hours as needed for Nausea/Vomiting. Nausea and vomiting        OxyCODONE HCl (Tab) ROXICODONE 10 MG Take 1-3 Tabs by mouth every 6 hours as needed for Moderate Pain.        Pravastatin Sodium (Tab) PRAVACHOL 20 MG Take 1 Tab by mouth every day.        PredniSONE (Tab) DELTASONE 5 MG Take 7 mg by mouth every morning.        Probiotic Product   Take 1 Cap by mouth every evening.        Sennosides (Tab) SENOKOT 8.6 MG Take 17.2 mg by mouth 2 times a day.        Sertraline HCl (Tab) ZOLOFT 100 MG Take 100 mg by mouth every bedtime.        Tacrolimus (Cap) PROGRAF 1 MG Take 1.5-2 mg by mouth 2 times  a day. 2 mg in the AM and 1.5 mg at 2000        Tadalafil (PAH) (Tab) Tadalafil (PAH) 20 MG Take 40 mg by mouth every bedtime.        Tiotropium Bromide Monohydrate (Cap) SPIRIVA 18 MCG Inhale 1 Cap by mouth every day.        TraZODone HCl (Tab) DESYREL 50 MG Take 1-2 Tabs by mouth at bedtime as needed for Sleep.        .                 Medicines prescribed today were sent to:     [facility][id number] - [place], [place] - [address]    [address] [place] [address]    Phone: [phone] Fax: [phone]    Open 24 Hours?: No    [facility] [address]    [address] [facility] [address]    Phone: [phone] Fax: [phone]    Open 24 Hours?: No      Medication refill instructions:       If your prescription bottle indicates you have medication refills left, it is not necessary to call your provider’s office. Please contact your pharmacy and they will refill your medication.    If your prescription bottle indicates you do not have any refills left, you may request refills at any time through one of the following ways: The online [employer] system (except Urgent Care), by calling your provider’s office, or by asking your pharmacy to contact your provider’s office with a refill request. Medication refills are processed only during regular business hours and may not be available until the next business day. Your provider may request additional information or to have a follow-up visit with you prior to refilling your medication.   *Please Note: Medication refills are assigned a new Rx number when refilled electronically. Your pharmacy may indicate that no refills were authorized even though a new prescription for the same medication is available at the pharmacy. Please request the medicine by name with the pharmacy before contacting your provider for a refill.           [employer] Access Code: Activation code not generated  Current [employer] Status:  Active

## 2017-01-20 NOTE — PROGRESS NOTES
Subjective:     Chief Complaint   Patient presents with   • Establish Care       Roxana Cbua is a 56 y.o. female here today for follow up on chronic medical conditions.   Patient has been seen in ER multiple times in the past several weeks due to abdominal pain. Patient states abdominal pain has since completely resolved. She will continue to follow with her specialists at [facility]      Pt was diagnosed with Sarcoidosis in 2011.  Pt is followed sarcoidosis specialist at [facility]. Patient has associated liver failure and pulmonary hypertension due to sarcoidosis. Patient underwent a liver transplant in 2011. Patient is currently using 5 L nasal cannula continuously. Patient recently underwent a lung biopsy in October 2016. Results are currently pending as per patient. This biopsy was done at [facility]    Pt has hx of bariatric sx at [facility] in Jan 2015 pt has lost 106lbs since surgery.     Pt is followed by Pulm at [facility]: Dr. Arango and  Dr. Gaming.  Pt is followed at [facility] every 3 months.     Pt is followed by Nephro [id number]. For history  of CK D stage III. Patient reports no change in urinary output, urinary frequency, or dysuria    Pt is followed by Dr. Reza for MGUS. Patient states that her labs have been relatively stable and no treatment has been indicated.     Pt is followed by Dr. Phan and Dr. Penaloza at [facility] for cardiology.     Pt is taking Flexeril and oxycodone 10mg TID for chronic pain. Patient also has Dilaudid at home but takes only when absolutely necessary which is 2-3 times per week. Patient's pain is due to surgical post-op pain as well as chronic back pain. Patient sits without oxycodone she is unable to do any of her activities of daily living. Patient states pain is worsening with lifting, bending, walking, and ambulating. Pain is mildly improved with rest and warm heat packs.      Pt is very anxious. Patient states that her anxiety has worsened due to prednisone. However patient needs prednisone due to her  sarcoidosis.  She is taking Xanax 0.5mg TID PRN     Allergies   Allergen Reactions   • Rhubarb Anaphylaxis   • Vancomycin Hcl      Causes red man syndrome when infused to fast       Current medicines (including changes today)  Current Outpatient Prescriptions   Medication Sig Dispense Refill   • ferrous sulfate 325 (65 FE) MG EC tablet Take 1 Tab by mouth 2 times a day. 180 Tab 4   • aspirin EC (ECOTRIN) 81 MG Tablet Delayed Response Take 1 Tab by mouth every morning. 90 Tab 4   • gabapentin (NEURONTIN) 600 MG tablet Take 1 Tab by mouth 3 times a day. 270 Tab 4   • levothyroxine (SYNTHROID) 137 MCG Tab Take 1 Tab by mouth every day. 90 Tab 4   • omeprazole (PRILOSEC) 40 MG delayed-release capsule Take 1 Cap by mouth every day. 90 Cap 4   • ondansetron (ZOFRAN ODT) 4 MG TABLET DISPERSIBLE Take 1 Tab by mouth every 8 hours as needed for Nausea/Vomiting. Nausea and vomiting 270 Tab 4   • alprazolam (XANAX) 0.5 MG Tab Take 1 Tab by mouth 3 times a day. 90 Tab 2   • cyclobenzaprine (FLEXERIL) 10 MG Tab Take 1 Tab by mouth 2 times a day as needed. 90 Tab 4   • amoxicillin-clavulanate (AUGMENTIN) 875-125 MG Tab Take 1 Tab by mouth 2 times a day for 8 days. 16 Tab 0   • trazodone (DESYREL) 50 MG Tab Take 1-2 Tabs by mouth at bedtime as needed for Sleep. 90 Tab 4   • tiotropium (SPIRIVA) 18 MCG Cap Inhale 1 Cap by mouth every day. 90 Cap 4   • fluticasone (FLONASE) 50 MCG/ACT nasal spray Spray 1 Spray in nose every day. 16 g 10   • oxycodone immediate release (ROXICODONE) 10 MG immediate release tablet Take 1-3 Tabs by mouth every 6 hours as needed for Moderate Pain. 90 Tab 0   • Calcium Citrate (CITRACAL PO) Take 2 Tabs by mouth 2 Times a Day.     • predniSONE (DELTASONE) 5 MG Tab Take 7 mg by mouth every morning.     • sertraline (ZOLOFT) 100 MG Tab Take 100 mg by mouth every bedtime.     • insulin glargine (LANTUS) 100 UNIT/ML Solution Inject  as instructed 1 time daily as needed (adjust as needed).     • insulin lispro  (HUMALOG) 100 UNIT/ML Solution Inject  as instructed 3 times a day before meals. Per sliding scale     • HYDROmorphone (DILAUDID) 4 MG Tab Take 8 mg by mouth 3 times a day as needed for Severe Pain.     • ambrisentan (LETAIRIS) 10 MG tablet Take 1 Tab by mouth every day. 30 Tab 11   • docusate sodium (COLACE) 100 MG Cap Take 100 mg by mouth 2 times a day.     • mycophenolate (CELLCEPT) 250 MG Cap Take 500 mg by mouth 2 times a day.     • furosemide (LASIX) 40 MG Tab Take 40 mg by mouth every morning.     • Tadalafil, PAH, (ADCIRCA) 20 MG Tab Take 40 mg by mouth every bedtime.     • pravastatin (PRAVACHOL) 20 MG Tab Take 1 Tab by mouth every day. 30 Tab 11   • tacrolimus (PROGRAF) 1 MG Cap Take 1.5-2 mg by mouth 2 times a day. 2 mg in the AM and 1.5 mg at 2000     • sennosides (SENOKOT) 8.6 MG Tab Take 17.2 mg by mouth 2 times a day.     • Probiotic Product (PROBIOTIC DAILY PO) Take 1 Cap by mouth every evening.       No current facility-administered medications for this visit.     Social History   Substance Use Topics   • Smoking status: Passive Smoke Exposure - Never Smoker   • Smokeless tobacco: Never Used      Comment: avoid all tobacco products   • Alcohol Use: No      Comment: 2009 quit drinking     Family Status   Relation Status Death Age   • Father     • Mother     • Sister Alive      Family History   Problem Relation Age of Onset   • Other Father      Unknown (dead 10 years)   • Diabetes Father    • Heart Disease Father    • Hypertension Father    • Hyperlipidemia Father    • Stroke Father    • Non-contributory Mother    • Hyperlipidemia Mother    • Alcohol/Drug Mother    • Cancer Paternal Aunt    • Alcohol/Drug Maternal Grandmother    • Alcohol/Drug Maternal Grandfather      She    has a past medical history of Cirrhosis (CMS-HCC) (2011); S/P cholecystectomy; GERD (gastroesophageal reflux disease); Psychiatric disorder; Fracture of left foot; Bronchitis ( ); CKD (chronic kidney  "disease) stage 3, GFR 30-59 ml/min; Breath shortness; Chronic fatigue and malaise; VRE (vancomycin-resistant Enterococci); Low back pain ( ); Pneumonia; Mild aortic regurgitation and aortic valve sclerosis ( ); Splenomegaly (7/14/2015); Small bowel obstruction (CMS-HCC); and On home oxygen therapy. She also has no past medical history of CATARACT, Angina, or Rheumatic fever.        ROS  GEN: Intentional weight loss status post gastric bypass, no fevers, no chills  HEENT: no blurry vision or change in vision  Resp: + shortness of breath, no cough  CV: no racing heart, no irregular beats, no chest pain  Abd: no nausea, no vomiting, no diarrhea, no constipation, no blood in stool, no dark stools, no incontinence  : no dysuria, no hematuria, no urinary incontinence, no increased frequency  MSK: Chronic diffuse muscle aches and pains, low back pain  Neuro: no headaches, no dizziness, no LOC     Objective:     Blood pressure 110/62, pulse 86, temperature 36.9 °C (98.4 °F), resp. rate 16, height 1.727 m (5' 7.99\"), weight 77.565 kg (171 lb), last menstrual period 01/03/2000, SpO2 92 %. Body mass index is 26.01 kg/(m^2).   Physical Exam:  Constitutional: Alert, no distress.  Skin: Warm, dry, good turgor  Eye: Equal, round and reactive, conjunctiva clear, lids normal.  ENMT: Lips without lesions, good dentition, oropharynx non-erythematous, no exudate, moist oral mucosa  Neck: Trachea midline, no masses, no thyromegaly. No cervical or supraclavicular lymphadenopathy. Full ROM  Respiratory: Unlabored respiratory effort, good air movement, lungs clear to auscultation, no wheezes, no ronchi.  Cardiovascular:RRR, +S1, S2, no murmur, no peripheral edema, pedal and radial pulses equal and intact bilat  Abdomen: Distended, Soft, non-tender, no masses  MSK:4/5 muscle strength in upper extremities as well as lower extremity bilaterally L4-S1 tenderness to palpation  Psych: Alert and oriented x3, appropriate affect and mood.  Neuro: " CN2-12 intact, no gross motor or sensory deficits      Assessment and Plan:   The following treatment plan was discussed    1. Chronic pain syndrome  Due to multiple chronic conditions including sarcoidosis. Recommend patient continue to use Roxicodone when necessary. Recommend patient stop Dilaudid. Risks opiates, muscle relaxants, and benzos discussed with patient. Particularly concerned due to patient's chronic respiratory failure.  - aspirin EC (ECOTRIN) 81 MG Tablet Delayed Response; Take 1 Tab by mouth every morning.  Dispense: 90 Tab; Refill: 4  - gabapentin (NEURONTIN) 600 MG tablet; Take 1 Tab by mouth 3 times a day.  Dispense: 270 Tab; Refill: 4  - alprazolam (XANAX) 0.5 MG Tab; Take 1 Tab by mouth 3 times a day.  Dispense: 90 Tab; Refill: 2  - cyclobenzaprine (FLEXERIL) 10 MG Tab; Take 1 Tab by mouth 2 times a day as needed.  Dispense: 90 Tab; Refill: 4  - trazodone (DESYREL) 50 MG Tab; Take 1-2 Tabs by mouth at bedtime as needed for Sleep.  Dispense: 90 Tab; Refill: 4  - oxycodone immediate release (ROXICODONE) 10 MG immediate release tablet; Take 1-3 Tabs by mouth every 6 hours as needed for Moderate Pain.  Dispense: 90 Tab; Refill: 0    2. Other back pain  - cyclobenzaprine (FLEXERIL) 10 MG Tab; Take 1 Tab by mouth 2 times a day as needed.  Dispense: 90 Tab; Refill: 4    3. Long term prescription opiate use  - I stressed the importance of  non-narcotic treatments to control pain including  NSAIDs, PT, surgery, gabapentin, Cymbalta, topical analgesics,home exercise plan, and pain management for injections.   -  I reviewed medications. The medications have  been  helpful for controlling the pain, maintaining mood, and allowing the patient to function, including ADLs.    -Current MED:45 without use of Dilaudid. Recommend patient stop Dilaudid  - Side effects are controlled. No constipation   - No aberrant behavior noted.  -  I reviewed diagnostic studies. I reviewed lab studies.  I reviewed medical  issues. I reviewed social issues.  - I have obtained and reviewed patient utilization report from [facility]. The pt has a meaningful improvement in function and pain without significant risk of harm. Based on my assessment through pt's  Report of pain, physical exam, diagnostic imaging, and review of records including , the controlled medicine prescriptions written today are medically necessary.  -The patient was advised to avoid alcohol use with prescribed medication. I counseled the patient regarding risks, side effects, and interactions of medications. Pt is advised to take no drugs and  take only medications reported to this office and prescribed.  I counseled the patient on the controlled substance prescribing program. I reviewed further symptomatic medications. I advised the pt of risk of use of NSAIDs for PUD and renal concerns.  I reviewed Acetaminophen dose and cautioned use due to liver involvement.   OPIOID RISK SCORE                                                       Female               Male   Family history of  Alcohol Abuse         1                  3    Illegal drug Abuse        x                  3    Prescription drug Abuse         x                  4   Personal Hx of  Alcohol Abuse         x                  3    Illegal Drug Abuse         x                  4    Prescription drug Abuse         x                  5   Age Hector if 16-35        x                   1   History of preadolescent sexual abuse          x                  0   Psychological Disease Attention Deficit        x     Obsessive compulsive      Bipolar      Schizophrenia                   2    Depression                            TOTAL( Risk Score)         1                    Low risk 0-3 recommend annual UDS  Moderate risk 4-7 recommend UDS 1-4 times per year  High risk 8+  Consider UDS at every visit        4. Sarcoidosis (CMS-HCC)  Chronic: We will continue to worsen. Continue to follow with specialists  at [facility]    5. Chronic respiratory failure with hypoxia (CMS-HCC)  Chronic: Patient requests Augmentin to have on hand for worsening respiratory failure and production of sputum. Risk benefits of antibiotics since the discussed with patient. Patient understands she needs to use medication only if needed before she can get to a provider. Again advised patient concern of opiates, benzos, and with respiratory failure.  - amoxicillin-clavulanate (AUGMENTIN) 875-125 MG Tab; Take 1 Tab by mouth 2 times a day for 8 days.  Dispense: 16 Tab; Refill: 0  - tiotropium (SPIRIVA) 18 MCG Cap; Inhale 1 Cap by mouth every day.  Dispense: 90 Cap; Refill: 4  - fluticasone (FLONASE) 50 MCG/ACT nasal spray; Spray 1 Spray in nose every day.  Dispense: 16 g; Refill: 10    6. Hypothyroidism, adult  Chronic: Most recent TSH 0.47 in January 2017  - levothyroxine (SYNTHROID) 137 MCG Tab; Take 1 Tab by mouth every day.  Dispense: 90 Tab; Refill: 4    7. Iron deficiency  Reported and lab work. Patient has multifactorial reason for anemia including MGUS, chronic disease, and iron deficiency. Recommend supplementation with 325 twice a day. Risk of constipation advised  - ferrous sulfate 325 (65 FE) MG EC tablet; Take 1 Tab by mouth 2 times a day.  Dispense: 180 Tab; Refill: 4    8. Gastroesophageal reflux disease with esophagitis  Controlled. Recommend continuing omeprazole  - omeprazole (PRILOSEC) 40 MG delayed-release capsule; Take 1 Cap by mouth every day.  Dispense: 90 Cap; Refill: 4    9. Nausea  Chronic: Only when necessary. Recommend continue using Zofran when necessary  - ondansetron (ZOFRAN ODT) 4 MG TABLET DISPERSIBLE; Take 1 Tab by mouth every 8 hours as needed for Nausea/Vomiting. Nausea and vomiting  Dispense: 270 Tab; Refill: 4    10. MGUS (monoclonal gammopathy of unknown significance)  Chronic: Followed by oncology. Recommend continuing to follow.    This was a 35 minute face-to-face visit spent reviewing patient's medical  condition, discussing treatment plans, and reviewing medications.      Followup: No Follow-up on file.    Please note that this dictation was created using voice recognition software. I have made every reasonable attempt to correct obvious errors, but I expect that there are errors of grammar and possibly content that I did not discover before finalizing the note.

## 2017-01-21 LAB
BACTERIA BLD CULT: NORMAL
BACTERIA BLD CULT: NORMAL
SIGNIFICANT IND 70042: NORMAL
SIGNIFICANT IND 70042: NORMAL
SITE SITE: NORMAL
SITE SITE: NORMAL
SOURCE SOURCE: NORMAL
SOURCE SOURCE: NORMAL

## 2017-01-25 ENCOUNTER — TELEPHONE (OUTPATIENT)
Dept: MEDICAL GROUP | Facility: PHYSICIAN GROUP | Age: 57
End: 2017-01-25

## 2017-01-25 DIAGNOSIS — R19.7 DIARRHEA OF PRESUMED INFECTIOUS ORIGIN: ICD-10-CM

## 2017-01-25 NOTE — TELEPHONE ENCOUNTER
1. Caller Name: Sharon                                         Call Back Number: [phone] (home)         Patient approves a detailed voicemail message: yes    2. SPECIFIC Action To Be Taken: Orders Needed- C-Diff Orders    3. Diagnosis/Clinical Reason for Request: NA    4. Specialty & Provider Name/Lab/Imaging Location: [facility]    5. Is appointment scheduled for requested order/referral: no    Patient informed they will receive a return phone call from the office ONLY if there are any questions before processing their request. Advised to call back if they haven't received a call from the referral department in 5 days.

## 2017-01-26 ENCOUNTER — HOSPITAL ENCOUNTER (OUTPATIENT)
Facility: MEDICAL CENTER | Age: 57
End: 2017-01-26
Attending: FAMILY MEDICINE
Payer: MEDICARE

## 2017-01-26 DIAGNOSIS — R19.7 DIARRHEA OF PRESUMED INFECTIOUS ORIGIN: ICD-10-CM

## 2017-01-26 LAB
C DIFF DNA SPEC QL NAA+PROBE: NEGATIVE
C DIFF TOX GENS STL QL NAA+PROBE: NEGATIVE

## 2017-01-26 PROCEDURE — 87493 C DIFF AMPLIFIED PROBE: CPT

## 2017-01-30 ENCOUNTER — PATIENT OUTREACH (OUTPATIENT)
Dept: HEALTH INFORMATION MANAGEMENT | Facility: OTHER | Age: 57
End: 2017-01-30

## 2017-01-30 NOTE — PROGRESS NOTES
Outcome:DECLINED  ANNUAL     Attempt # 1ST     Annual Wellness Visit Scheduling  Scheduling Status: Scheduled; Not Scheduled; NOT SCHEDULED   If Not Scheduled, Choose Reason Why: SEES PCP REGULARLY     Care Coordination Enrollment (Attempt to Enroll in Care Coordination)   1. Is patient appropriate: YES; NO; NA   2. If does not qualify, why? NA  3. Is patient interested: YES; NO; NA  4. If not interested, declined reason: NA    (If interested, input smart phrase .outreachenrollment)    Care Gap Scheduling (Attempt to Schedule EACH Overdue Care Gap!)  Health Maintenance Due   Topic    • IMM HEP B VACCINE (1 of 3 - Primary Series) DISCUSS WITH PT    • DIABETES MONOFILAMENT / LE EXAM  DISCUSS WITH PT    • IMM DTaP/Tdap/Td Vaccine (1 - Tdap) SCHEDULED          MyChart Activation:  Already Active/Declined/Sent Activation Code  NA

## 2017-01-31 ENCOUNTER — HOSPITAL ENCOUNTER (OUTPATIENT)
Dept: CARDIOLOGY | Facility: MEDICAL CENTER | Age: 57
End: 2017-01-31
Attending: INTERNAL MEDICINE
Payer: MEDICARE

## 2017-01-31 DIAGNOSIS — I27.20 PULMONARY HYPERTENSION (HCC): ICD-10-CM

## 2017-01-31 PROCEDURE — 93306 TTE W/DOPPLER COMPLETE: CPT

## 2017-02-01 ENCOUNTER — HOSPITAL ENCOUNTER (OUTPATIENT)
Dept: RADIOLOGY | Facility: MEDICAL CENTER | Age: 57
End: 2017-02-01
Attending: FAMILY MEDICINE
Payer: MEDICARE

## 2017-02-01 ENCOUNTER — TELEPHONE (OUTPATIENT)
Dept: MEDICAL GROUP | Facility: PHYSICIAN GROUP | Age: 57
End: 2017-02-01

## 2017-02-01 DIAGNOSIS — R92.8 ABNORMAL MAMMOGRAM OF LEFT BREAST: ICD-10-CM

## 2017-02-01 DIAGNOSIS — R92.8 ABNORMAL MAMMOGRAM: ICD-10-CM

## 2017-02-01 LAB
LV EJECT FRACT MOD 2C 99903: 70
LV EJECT FRACT MOD 4C 99902: 63.11
LV EJECT FRACT MOD BP 99901: 65.36

## 2017-02-01 PROCEDURE — 76642 ULTRASOUND BREAST LIMITED: CPT | Mod: LT

## 2017-02-01 PROCEDURE — G0206 DX MAMMO INCL CAD UNI: HCPCS | Mod: LT

## 2017-02-02 NOTE — TELEPHONE ENCOUNTER
Please call patient informed her that I have reviewed her breast imaging. As per the note, the  imaging center explained the results to the patient. If she has any questions please advise her to make an appointment or call me. I have created a referral to surgery for suspicious area on imaging to be biopsied.

## 2017-02-08 ENCOUNTER — TELEPHONE (OUTPATIENT)
Dept: CARDIOLOGY | Facility: MEDICAL CENTER | Age: 57
End: 2017-02-08

## 2017-02-08 ENCOUNTER — HOSPITAL ENCOUNTER (OUTPATIENT)
Facility: MEDICAL CENTER | Age: 57
End: 2017-02-08
Attending: INTERNAL MEDICINE | Admitting: INTERNAL MEDICINE
Payer: MEDICARE

## 2017-02-08 NOTE — TELEPHONE ENCOUNTER
----- Message from Maxwell Phan M.D. sent at 2/8/2017  7:29 AM PST -----  Mild valve issues, we can discuss in f/u

## 2017-02-13 ENCOUNTER — PATIENT OUTREACH (OUTPATIENT)
Dept: HEALTH INFORMATION MANAGEMENT | Facility: OTHER | Age: 57
End: 2017-02-13

## 2017-02-13 NOTE — PROGRESS NOTES
Spoke with Roxana regarding [facility] enrollment.      Is the patient interested in [facility]? No   If no, the reason is pt said she's not interested in this program and she had to hurry up and get off the phone her daughter was calling on the other line .     Have additional program informational materials been sent to their home address?  No      Has the patient agreed to have a Nurse Care  them to provide additional information? No

## 2017-02-14 ENCOUNTER — TELEPHONE (OUTPATIENT)
Dept: CARDIOLOGY | Facility: MEDICAL CENTER | Age: 57
End: 2017-02-14

## 2017-02-14 NOTE — Clinical Note
PROCEDURE/SURGERY CLEARANCE FORM      Encounter Date: 2/14/2017    Patient: Roxana Cuba  YOB: 1960    CARDIOLOGIST: Dr. Maxwell Phan   REFERRING DOCTOR: Dr. Jane Zhao         [facility]    Telephone: [phone]          Fax: [phone] (attention [facility])    The above patient is cleared at moderate cardiac risk to undergo planned Bx with one hour general anesthesia.    No additional cardiac testing is indicated prior to procedure.    She may hold her ASA 7 days prior to biopsy and resume day after biopsy.    Feel free to contact our office.                     MD Nita Phan MD

## 2017-02-14 NOTE — TELEPHONE ENCOUNTER
"SURGICAL CLEARANCE REQUEST:   [facility]  Telephone: [phone] ([facility] surgical coordinator)  Fax: [phone]  Dr. Zhao is requesting cardiac clearance to undergo 1 hour general anesthesia for \"Wire localized excisional biopsy left breast mass on 2/21/17.  Also ok to hold ASA 81mg 7 days prior.  To Dr. Phan to advise  "

## 2017-02-15 ENCOUNTER — HOSPITAL ENCOUNTER (OUTPATIENT)
Dept: LAB | Facility: MEDICAL CENTER | Age: 57
End: 2017-02-15
Attending: INTERNAL MEDICINE
Payer: MEDICARE

## 2017-02-15 LAB
25(OH)D3 SERPL-MCNC: 48 NG/ML (ref 30–100)
ALBUMIN SERPL BCP-MCNC: 4.3 G/DL (ref 3.2–4.9)
ALBUMIN SERPL BCP-MCNC: 4.3 G/DL (ref 3.2–4.9)
ALBUMIN/GLOB SERPL: 2 G/DL
ALBUMIN/GLOB SERPL: 2 G/DL
ALP SERPL-CCNC: 27 U/L (ref 30–99)
ALP SERPL-CCNC: 27 U/L (ref 30–99)
ALT SERPL-CCNC: 11 U/L (ref 2–50)
ALT SERPL-CCNC: 12 U/L (ref 2–50)
ANION GAP SERPL CALC-SCNC: 7 MMOL/L (ref 0–11.9)
ANION GAP SERPL CALC-SCNC: 7 MMOL/L (ref 0–11.9)
APPEARANCE UR: ABNORMAL
AST SERPL-CCNC: 16 U/L (ref 12–45)
AST SERPL-CCNC: 16 U/L (ref 12–45)
BASOPHILS # BLD AUTO: 0.03 K/UL (ref 0–0.12)
BASOPHILS # BLD AUTO: 0.03 K/UL (ref 0–0.12)
BASOPHILS NFR BLD AUTO: 0.8 % (ref 0–1.8)
BASOPHILS NFR BLD AUTO: 0.8 % (ref 0–1.8)
BILIRUB SERPL-MCNC: 0.4 MG/DL (ref 0.1–1.5)
BILIRUB SERPL-MCNC: 0.4 MG/DL (ref 0.1–1.5)
BILIRUB UR QL STRIP.AUTO: NEGATIVE
BUN SERPL-MCNC: 24 MG/DL (ref 8–22)
BUN SERPL-MCNC: 24 MG/DL (ref 8–22)
CALCIUM SERPL-MCNC: 9.8 MG/DL (ref 8.5–10.5)
CALCIUM SERPL-MCNC: 9.8 MG/DL (ref 8.5–10.5)
CHLORIDE SERPL-SCNC: 102 MMOL/L (ref 96–112)
CHLORIDE SERPL-SCNC: 102 MMOL/L (ref 96–112)
CO2 SERPL-SCNC: 31 MMOL/L (ref 20–33)
CO2 SERPL-SCNC: 31 MMOL/L (ref 20–33)
COLOR UR AUTO: YELLOW
CREAT SERPL-MCNC: 1.1 MG/DL (ref 0.5–1.4)
CREAT SERPL-MCNC: 1.12 MG/DL (ref 0.5–1.4)
EOSINOPHIL # BLD: 0.13 K/UL (ref 0–0.51)
EOSINOPHIL # BLD: 0.16 K/UL (ref 0–0.51)
EOSINOPHIL NFR BLD AUTO: 3.3 % (ref 0–6.9)
EOSINOPHIL NFR BLD AUTO: 4.1 % (ref 0–6.9)
EPITHELIAL CELLS 1715: ABNORMAL /HPF
ERYTHROCYTE [DISTWIDTH] IN BLOOD BY AUTOMATED COUNT: 49.1 FL (ref 35.9–50)
ERYTHROCYTE [DISTWIDTH] IN BLOOD BY AUTOMATED COUNT: 49.1 FL (ref 35.9–50)
GGT SERPL-CCNC: 8 U/L (ref 7–34)
GLOBULIN SER CALC-MCNC: 2.1 G/DL (ref 1.9–3.5)
GLOBULIN SER CALC-MCNC: 2.2 G/DL (ref 1.9–3.5)
GLUCOSE SERPL-MCNC: 93 MG/DL (ref 65–99)
GLUCOSE SERPL-MCNC: 93 MG/DL (ref 65–99)
GLUCOSE UR STRIP.AUTO-MCNC: NEGATIVE MG/DL
HCT VFR BLD AUTO: 37.9 % (ref 37–47)
HCT VFR BLD AUTO: 38.4 % (ref 37–47)
HGB BLD-MCNC: 11.9 G/DL (ref 12–16)
HGB BLD-MCNC: 12.1 G/DL (ref 12–16)
IMM GRANULOCYTES # BLD AUTO: 0 K/UL (ref 0–0.11)
IMM GRANULOCYTES # BLD AUTO: 0.01 K/UL (ref 0–0.11)
IMM GRANULOCYTES NFR BLD AUTO: 0 % (ref 0–0.9)
IMM GRANULOCYTES NFR BLD AUTO: 0.3 % (ref 0–0.9)
KETONES UR STRIP.AUTO-MCNC: NEGATIVE MG/DL
LEUKOCYTE ESTERASE UR QL STRIP.AUTO: NEGATIVE
LYMPHOCYTES # BLD: 1.12 K/UL (ref 1–4.8)
LYMPHOCYTES # BLD: 1.2 K/UL (ref 1–4.8)
LYMPHOCYTES NFR BLD AUTO: 28.7 % (ref 22–41)
LYMPHOCYTES NFR BLD AUTO: 30.2 % (ref 22–41)
MAGNESIUM SERPL-MCNC: 2.1 MG/DL (ref 1.5–2.5)
MAGNESIUM SERPL-MCNC: 2.1 MG/DL (ref 1.5–2.5)
MCH RBC QN AUTO: 28 PG (ref 27–33)
MCH RBC QN AUTO: 28.3 PG (ref 27–33)
MCHC RBC AUTO-ENTMCNC: 31.4 G/DL (ref 33.6–35)
MCHC RBC AUTO-ENTMCNC: 31.5 G/DL (ref 33.6–35)
MCV RBC AUTO: 89.2 FL (ref 81.4–97.8)
MCV RBC AUTO: 89.9 FL (ref 81.4–97.8)
MICRO URNS: ABNORMAL
MONOCYTES # BLD: 0.32 K/UL (ref 0–0.85)
MONOCYTES # BLD: 0.32 K/UL (ref 0–0.85)
MONOCYTES NFR BLD AUTO: 8 % (ref 0–13.4)
MONOCYTES NFR BLD AUTO: 8.2 % (ref 0–13.4)
NEUTROPHILS # BLD: 2.26 K/UL (ref 2–7.15)
NEUTROPHILS # BLD: 2.3 K/UL (ref 2–7.15)
NEUTROPHILS NFR BLD AUTO: 57.7 % (ref 44–72)
NEUTROPHILS NFR BLD AUTO: 57.9 % (ref 44–72)
NITRITE UR QL STRIP.AUTO: NEGATIVE
NRBC # BLD AUTO: 0 K/UL
NRBC # BLD AUTO: 0 K/UL
NRBC BLD-RTO: 0 /100 WBC
NRBC BLD-RTO: 0 /100 WBC
PH UR: 6.5 [PH]
PHOSPHATE SERPL-MCNC: 4.6 MG/DL (ref 2.5–4.5)
PLATELET # BLD AUTO: 75 K/UL (ref 164–446)
PLATELET # BLD AUTO: 80 K/UL (ref 164–446)
PMV BLD AUTO: 9.7 FL (ref 9–12.9)
PMV BLD AUTO: 9.9 FL (ref 9–12.9)
POTASSIUM SERPL-SCNC: 3.9 MMOL/L (ref 3.6–5.5)
POTASSIUM SERPL-SCNC: 4 MMOL/L (ref 3.6–5.5)
PROT SERPL-MCNC: 6.4 G/DL (ref 6–8.2)
PROT SERPL-MCNC: 6.5 G/DL (ref 6–8.2)
PROT UR QL STRIP: NEGATIVE MG/DL
PTH-INTACT SERPL-MCNC: 53.5 PG/ML (ref 14–72)
RBC # BLD AUTO: 4.25 M/UL (ref 4.2–5.4)
RBC # BLD AUTO: 4.27 M/UL (ref 4.2–5.4)
RBC #/AREA URNS HPF: ABNORMAL /HPF
RBC UR QL AUTO: NEGATIVE
SODIUM SERPL-SCNC: 140 MMOL/L (ref 135–145)
SODIUM SERPL-SCNC: 140 MMOL/L (ref 135–145)
SP GR UR STRIP.AUTO: 1.02
URATE SERPL-MCNC: 5.9 MG/DL (ref 1.9–8.2)
WBC # BLD AUTO: 3.9 K/UL (ref 4.8–10.8)
WBC # BLD AUTO: 4 K/UL (ref 4.8–10.8)
WBC #/AREA URNS HPF: ABNORMAL /HPF

## 2017-02-15 PROCEDURE — 82977 ASSAY OF GGT: CPT

## 2017-02-15 PROCEDURE — 80053 COMPREHEN METABOLIC PANEL: CPT

## 2017-02-15 PROCEDURE — 80197 ASSAY OF TACROLIMUS: CPT

## 2017-02-15 PROCEDURE — 83735 ASSAY OF MAGNESIUM: CPT

## 2017-02-15 PROCEDURE — 36415 COLL VENOUS BLD VENIPUNCTURE: CPT

## 2017-02-15 PROCEDURE — 85025 COMPLETE CBC W/AUTO DIFF WBC: CPT

## 2017-02-17 ENCOUNTER — APPOINTMENT (OUTPATIENT)
Dept: ADMISSIONS | Facility: MEDICAL CENTER | Age: 57
End: 2017-02-17
Attending: SURGERY
Payer: MEDICARE

## 2017-02-17 LAB — TACROLIMUS BLD-MCNC: 2.4 NG/ML

## 2017-02-21 ENCOUNTER — APPOINTMENT (OUTPATIENT)
Dept: RADIOLOGY | Facility: MEDICAL CENTER | Age: 57
End: 2017-02-21
Attending: SURGERY
Payer: MEDICARE

## 2017-02-21 ENCOUNTER — HOSPITAL ENCOUNTER (OUTPATIENT)
Facility: MEDICAL CENTER | Age: 57
End: 2017-02-21
Attending: SURGERY | Admitting: SURGERY
Payer: MEDICARE

## 2017-02-21 VITALS
BODY MASS INDEX: 25.89 KG/M2 | DIASTOLIC BLOOD PRESSURE: 69 MMHG | OXYGEN SATURATION: 100 % | TEMPERATURE: 98.6 F | RESPIRATION RATE: 16 BRPM | SYSTOLIC BLOOD PRESSURE: 109 MMHG | HEART RATE: 68 BPM | HEIGHT: 68 IN | WEIGHT: 170.86 LBS

## 2017-02-21 DIAGNOSIS — D48.62 NEOPLASM OF UNCERTAIN BEHAVIOR OF LEFT BREAST: ICD-10-CM

## 2017-02-21 LAB
ABO GROUP BLD: NORMAL
BASOPHILS # BLD AUTO: 0.6 % (ref 0–1.8)
BASOPHILS # BLD: 0.02 K/UL (ref 0–0.12)
BLD GP AB SCN SERPL QL: NORMAL
EOSINOPHIL # BLD AUTO: 0.09 K/UL (ref 0–0.51)
EOSINOPHIL NFR BLD: 2.7 % (ref 0–6.9)
ERYTHROCYTE [DISTWIDTH] IN BLOOD BY AUTOMATED COUNT: 47.4 FL (ref 35.9–50)
GLUCOSE BLD-MCNC: 113 MG/DL (ref 65–99)
GLUCOSE BLD-MCNC: 54 MG/DL (ref 65–99)
HCT VFR BLD AUTO: 33.4 % (ref 37–47)
HGB BLD-MCNC: 11 G/DL (ref 12–16)
IMM GRANULOCYTES # BLD AUTO: 0.01 K/UL (ref 0–0.11)
IMM GRANULOCYTES NFR BLD AUTO: 0.3 % (ref 0–0.9)
LYMPHOCYTES # BLD AUTO: 1.17 K/UL (ref 1–4.8)
LYMPHOCYTES NFR BLD: 35.3 % (ref 22–41)
MCH RBC QN AUTO: 28.8 PG (ref 27–33)
MCHC RBC AUTO-ENTMCNC: 32.9 G/DL (ref 33.6–35)
MCV RBC AUTO: 87.4 FL (ref 81.4–97.8)
MONOCYTES # BLD AUTO: 0.29 K/UL (ref 0–0.85)
MONOCYTES NFR BLD AUTO: 8.8 % (ref 0–13.4)
NEUTROPHILS # BLD AUTO: 1.73 K/UL (ref 2–7.15)
NEUTROPHILS NFR BLD: 52.3 % (ref 44–72)
NRBC # BLD AUTO: 0 K/UL
NRBC BLD AUTO-RTO: 0 /100 WBC
PLATELET # BLD AUTO: 62 K/UL (ref 164–446)
PMV BLD AUTO: 8.7 FL (ref 9–12.9)
RBC # BLD AUTO: 3.82 M/UL (ref 4.2–5.4)
RH BLD: NORMAL
WBC # BLD AUTO: 3.3 K/UL (ref 4.8–10.8)

## 2017-02-21 PROCEDURE — 700101 HCHG RX REV CODE 250

## 2017-02-21 PROCEDURE — 700111 HCHG RX REV CODE 636 W/ 250 OVERRIDE (IP)

## 2017-02-21 PROCEDURE — 502240 HCHG MISC OR SUPPLY RC 0272: Performed by: SURGERY

## 2017-02-21 PROCEDURE — 82962 GLUCOSE BLOOD TEST: CPT

## 2017-02-21 PROCEDURE — 19281 PERQ DEVICE BREAST 1ST IMAG: CPT | Mod: LT

## 2017-02-21 PROCEDURE — 86900 BLOOD TYPING SEROLOGIC ABO: CPT

## 2017-02-21 PROCEDURE — 85025 COMPLETE CBC W/AUTO DIFF WBC: CPT

## 2017-02-21 PROCEDURE — 160035 HCHG PACU - 1ST 60 MINS PHASE I: Performed by: SURGERY

## 2017-02-21 PROCEDURE — 700102 HCHG RX REV CODE 250 W/ 637 OVERRIDE(OP)

## 2017-02-21 PROCEDURE — 76098 X-RAY EXAM SURGICAL SPECIMEN: CPT | Mod: LT

## 2017-02-21 PROCEDURE — 86901 BLOOD TYPING SEROLOGIC RH(D): CPT

## 2017-02-21 PROCEDURE — 110382 HCHG SHELL REV 271: Performed by: SURGERY

## 2017-02-21 PROCEDURE — 500888 HCHG PACK, MINOR BASIN: Performed by: SURGERY

## 2017-02-21 PROCEDURE — A9270 NON-COVERED ITEM OR SERVICE: HCPCS

## 2017-02-21 PROCEDURE — 500122 HCHG BOVIE, BLADE: Performed by: SURGERY

## 2017-02-21 PROCEDURE — 36415 COLL VENOUS BLD VENIPUNCTURE: CPT

## 2017-02-21 PROCEDURE — 160002 HCHG RECOVERY MINUTES (STAT): Performed by: SURGERY

## 2017-02-21 PROCEDURE — 501838 HCHG SUTURE GENERAL: Performed by: SURGERY

## 2017-02-21 PROCEDURE — A4606 OXYGEN PROBE USED W OXIMETER: HCPCS | Performed by: SURGERY

## 2017-02-21 PROCEDURE — 160048 HCHG OR STATISTICAL LEVEL 1-5: Performed by: SURGERY

## 2017-02-21 PROCEDURE — 160029 HCHG SURGERY MINUTES - 1ST 30 MINS LEVEL 4: Performed by: SURGERY

## 2017-02-21 PROCEDURE — A6402 STERILE GAUZE <= 16 SQ IN: HCPCS | Performed by: SURGERY

## 2017-02-21 PROCEDURE — 88307 TISSUE EXAM BY PATHOLOGIST: CPT

## 2017-02-21 PROCEDURE — 160041 HCHG SURGERY MINUTES - EA ADDL 1 MIN LEVEL 4: Performed by: SURGERY

## 2017-02-21 PROCEDURE — 160009 HCHG ANES TIME/MIN: Performed by: SURGERY

## 2017-02-21 PROCEDURE — 86850 RBC ANTIBODY SCREEN: CPT

## 2017-02-21 RX ORDER — MIDAZOLAM HYDROCHLORIDE 1 MG/ML
INJECTION INTRAMUSCULAR; INTRAVENOUS
Status: DISCONTINUED
Start: 2017-02-21 | End: 2017-02-21 | Stop reason: HOSPADM

## 2017-02-21 RX ORDER — DEXTROSE MONOHYDRATE 25 G/50ML
25 INJECTION, SOLUTION INTRAVENOUS
Status: DISCONTINUED | OUTPATIENT
Start: 2017-02-21 | End: 2017-02-21 | Stop reason: HOSPADM

## 2017-02-21 RX ORDER — SODIUM CHLORIDE, SODIUM LACTATE, POTASSIUM CHLORIDE, CALCIUM CHLORIDE 600; 310; 30; 20 MG/100ML; MG/100ML; MG/100ML; MG/100ML
1000 INJECTION, SOLUTION INTRAVENOUS
Status: COMPLETED | OUTPATIENT
Start: 2017-02-21 | End: 2017-02-21

## 2017-02-21 RX ORDER — DEXTROSE MONOHYDRATE 25 G/50ML
INJECTION, SOLUTION INTRAVENOUS
Status: COMPLETED
Start: 2017-02-21 | End: 2017-02-21

## 2017-02-21 RX ORDER — BUPIVACAINE HYDROCHLORIDE AND EPINEPHRINE 5; 5 MG/ML; UG/ML
INJECTION, SOLUTION EPIDURAL; INTRACAUDAL; PERINEURAL
Status: DISCONTINUED | OUTPATIENT
Start: 2017-02-21 | End: 2017-02-21 | Stop reason: HOSPADM

## 2017-02-21 RX ORDER — ALPRAZOLAM 0.25 MG/1
TABLET ORAL
Status: COMPLETED
Start: 2017-02-21 | End: 2017-02-21

## 2017-02-21 RX ADMIN — DEXTROSE MONOHYDRATE: 25 INJECTION, SOLUTION INTRAVENOUS at 07:50

## 2017-02-21 RX ADMIN — ALPRAZOLAM 0.25 MG: 0.25 TABLET ORAL at 07:15

## 2017-02-21 RX ADMIN — SODIUM CHLORIDE, SODIUM LACTATE, POTASSIUM CHLORIDE, CALCIUM CHLORIDE 1000 ML: 600; 310; 30; 20 INJECTION, SOLUTION INTRAVENOUS at 07:55

## 2017-02-21 ASSESSMENT — PAIN SCALES - GENERAL
PAINLEVEL_OUTOF10: 0
PAINLEVEL_OUTOF10: 7
PAINLEVEL_OUTOF10: 0
PAINLEVEL_OUTOF10: 0

## 2017-02-21 NOTE — OP REPORT
DATE OF SERVICE:  02/21/2017    PREOPERATIVE DIAGNOSIS:  Neoplasm of uncertain behavior, left breast.    POSTOPERATIVE DIAGNOSIS:  Neoplasm of uncertain behavior, left breast.    PROCEDURE PERFORMED:  Wire localized excisional biopsy of left breast mass.    SURGEON:  Jane Zhao MD    ANESTHESIOLOGIST:  Maci Borja MD    ANESTHESIA TYPE:  General.    PREOPERATIVE MEDICATIONS:  Ancef.    ASA:  Class III.    INDICATIONS:  This is a 57-year-old woman with multiple medical problems   including chronic immunosuppression for liver transplant who presents with new   density on mammogram.  She is extremely worried about this, possible   malignant neoplasm and after discussing her alternative options, strongly   desires proceeding with excisional biopsy.  We have discussed the risks and   benefits at length and she is ready to proceed.    FINDINGS:  The nodule appears to be present at the tip of the wire in the   expected location per the radiologist, based on specimen mammogram.    DESCRIPTION OF PROCEDURE:  The patient underwent wire localization by the   radiologist.  She was taken to the operating room where she was anesthetized   in a supine position.  She was prepped with Betadine and draped in sterile   fashion.  Time out was confirmed.  Markings were placed by the radiologist   indicating the area of interest and this was located at the areolar border.    Local anesthetic was injected and a curvilinear incision was created at the   areolar border and the wire was identified in situ and then the area of   interest was excised using electrocautery.  Marking sutures were placed in   typical fashion.  Hemostasis was assured.  Specimen mammogram appeared to   confirm the nodule to be present and therefore, we irrigated thoroughly and   2-0 PDS was used for a lattice given her very atrophic breast tissue and then   3-0 Vicryls were used to close the deep dermal layer.  A 4-0 Monocryl was used   to close the skin.   Sterile dressing was applied and I was informed all   counts were correct.       ____________________________________     MD MAURA DINERO / SAVAGE    DD:  02/21/2017 12:21:57  DT:  02/21/2017 15:07:51    D#:  [id number]  Job#:  [id number]    cc: ALICE PONCE MD, Bernarda Damon DO, TUCKER CORRIGAN MD, APOLLO THOMAS MD

## 2017-02-21 NOTE — OR SURGEON
Immediate Post-Operative Note      PreOp Diagnosis: Left breast mass    PostOp Diagnosis: same    Procedure(s):  BREAST BIOPSY - WIRE LOCALIZED EXCISONAL  - Wound Class: Clean    Surgeon(s):  Jane Zhao M.D.    Anesthesiologist/Type of Anesthesia:  Anesthesiologist: Maci Borja M.D.; Jana Nunez M.D./General    Surgical Staff:  Circulator: WALKER Yanez Circulator: WALKER [place] Scrub: Jr Kirkalnd  Scrub Person: Michelle Romo    Specimen: Yes    Estimated Blood Loss: Min    Findings: Nodule visible on specimen mammogram per Radiology.      Complications: None    ASA 3  Dictated [id number]    2/21/2017 12:17 PM Jane Zhao

## 2017-02-21 NOTE — OR NURSING
1221 received pt from OR with PEDOR Huerta breathing even and unlabored. Dry gauze and tegaderm dressing to left breast CDI. soda given  1250  at bedside.  1300 discharge instructions given- all questions and concerns addressed.  1320 pt discharged. Pt ambulated out with steady gait.  at side.

## 2017-02-21 NOTE — OR NURSING
0750: FSBS-54, pt states it was 97 this morning when she took it, pt is a insulin dependent diabetic, notified Dr. Borja anesthesia, orders rec'd for D50%, pt given 1/2 amp.

## 2017-02-21 NOTE — DISCHARGE INSTRUCTIONS
ACTIVITY: Rest and take it easy for the first 24 hours.  A responsible adult is recommended to remain with you during that time.  It is normal to feel sleepy.  We encourage you to not do anything that requires balance, judgment or coordination.    MILD FLU-LIKE SYMPTOMS ARE NORMAL. YOU MAY EXPERIENCE GENERALIZED MUSCLE ACHES, THROAT IRRITATION, HEADACHE AND/OR SOME NAUSEA.    FOR 24 HOURS DO NOT:  Drive, operate machinery or run household appliances.  Drink beer or alcoholic beverages.   Make important decisions or sign legal documents.    SPECIAL INSTRUCTIONS: follow instructions given by Dr. Zhao from Pre-op appointment.    DIET: To avoid nausea, slowly advance diet as tolerated, avoiding spicy or greasy foods for the first day.  Add more substantial food to your diet according to your physician's instructions.  Babies can be fed formula or breast milk as soon as they are hungry.  INCREASE FLUIDS AND FIBER TO AVOID CONSTIPATION.    SURGICAL DRESSING/BATHING: may shower tomorrow, no baths.     FOLLOW-UP APPOINTMENT:  A follow-up appointment should be arranged with your doctor in ; call to schedule.    You should CALL YOUR PHYSICIAN if you develop:  Fever greater than 101 degrees F.  Pain not relieved by medication, or persistent nausea or vomiting.  Excessive bleeding (blood soaking through dressing) or unexpected drainage from the wound.  Extreme redness or swelling around the incision site, drainage of pus or foul smelling drainage.  Inability to urinate or empty your bladder within 8 hours.  Problems with breathing or chest pain.    You should call 911 if you develop problems with breathing or chest pain.  If you are unable to contact your doctor or surgical center, you should go to the nearest emergency room or urgent care center.  Physician's telephone #: [phone]    If any questions arise, call your doctor.  If your doctor is not available, please feel free to call the Surgical Center at [phone].   The Center is open Monday through Friday from 7AM to 7PM.  You can also call the HEALTH HOTLINE open 24 hours/day, 7 days/week and speak to a nurse at [phone], or toll free at [phone].    A registered nurse may call you a few days after your surgery to see how you are doing after your procedure.    MEDICATIONS: Resume taking daily medication.  Take prescribed pain medication with food.  If no medication is prescribed, you may take non-aspirin pain medication if needed.  PAIN MEDICATION CAN BE VERY CONSTIPATING.  Take a stool softener or laxative such as senokot, pericolace, or milk of magnesia if needed.    Prescription given for NONE GIVEN.  Last pain medication given at ______.    If your physician has prescribed pain medication that includes Acetaminophen (Tylenol), do not take additional Acetaminophen (Tylenol) while taking the prescribed medication.    Depression / Suicide Risk    As you are discharged from this [facility] Health facility, it is important to learn how to keep safe from harming yourself.    Recognize the warning signs:  · Abrupt changes in personality, positive or negative- including increase in energy   · Giving away possessions  · Change in eating patterns- significant weight changes-  positive or negative  · Change in sleeping patterns- unable to sleep or sleeping all the time   · Unwillingness or inability to communicate  · Depression  · Unusual sadness, discouragement and loneliness  · Talk of wanting to die  · Neglect of personal appearance   · Rebelliousness- reckless behavior  · Withdrawal from people/activities they love  · Confusion- inability to concentrate     If you or a loved one observes any of these behaviors or has concerns about self-harm, here's what you can do:  · Talk about it- your feelings and reasons for harming yourself  · Remove any means that you might use to hurt yourself (examples: pills, rope, extension cords, firearm)  · Get professional help from the  community (Mental Health, Substance Abuse, psychological counseling)  · Do not be alone:Call your Safe Contact- someone whom you trust who will be there for you.  · Call your local CRISIS HOTLINE 473-6155 or 090-691-3011  · Call your local Children's Mobile Crisis Response Team [place] (554) 999-6278 or www.ONStor  · Call the toll free National Suicide Prevention Hotlines   · National Suicide Prevention Lifeline 554-642-OVHE (7263)  · National Hope Line Network 800-SUICIDE (753-7411)

## 2017-02-21 NOTE — IP AVS SNAPSHOT
2/21/2017          Roxana Cuba  [address]  [place] [address]    Dear Roxana:    [facility] wants to ensure your discharge home is safe and you or your loved ones have had all your questions answered regarding your care after you leave the hospital.    You may receive a telephone call within two days of your discharge.  This call is to make certain you understand your discharge instructions as well as ensure we provided you with the best care possible during your stay with us.     The call will only last approximately 3-5 minutes and will be done by a nurse.    Once again, we want to ensure your discharge home is safe and that you have a clear understanding of any next steps in your care.  If you have any questions or concerns, please do not hesitate to contact us, we are here for you.  Thank you for choosing [facility] for your healthcare needs.    Sincerely,    Fredrick Figueroa    [facility]

## 2017-02-21 NOTE — IP AVS SNAPSHOT
" Home Care Instructions                                                                                                                Name:Roxana Cuba  Medical Record Number:[id number]  CSN: [id number]    YOB: 1960   Age: 57 y.o.  Sex: female  HT:1.727 m (5' 7.99\") WT: 77.5 kg (170 lb 13.7 oz)          Admit Date: 2/21/2017     Discharge Date:   Today's Date: 2/21/2017  Attending Doctor:  Jane Zhao M.D.                  Allergies:  Rhubarb and Vancomycin hcl                Discharge Instructions         ACTIVITY: Rest and take it easy for the first 24 hours.  A responsible adult is recommended to remain with you during that time.  It is normal to feel sleepy.  We encourage you to not do anything that requires balance, judgment or coordination.    MILD FLU-LIKE SYMPTOMS ARE NORMAL. YOU MAY EXPERIENCE GENERALIZED MUSCLE ACHES, THROAT IRRITATION, HEADACHE AND/OR SOME NAUSEA.    FOR 24 HOURS DO NOT:  Drive, operate machinery or run household appliances.  Drink beer or alcoholic beverages.   Make important decisions or sign legal documents.    SPECIAL INSTRUCTIONS: follow instructions given by Dr. Zhao from Pre-op appointment.    DIET: To avoid nausea, slowly advance diet as tolerated, avoiding spicy or greasy foods for the first day.  Add more substantial food to your diet according to your physician's instructions.  Babies can be fed formula or breast milk as soon as they are hungry.  INCREASE FLUIDS AND FIBER TO AVOID CONSTIPATION.    SURGICAL DRESSING/BATHING: may shower tomorrow, no baths.     FOLLOW-UP APPOINTMENT:  A follow-up appointment should be arranged with your doctor in ; call to schedule.    You should CALL YOUR PHYSICIAN if you develop:  Fever greater than 101 degrees F.  Pain not relieved by medication, or persistent nausea or vomiting.  Excessive bleeding (blood soaking through dressing) or unexpected drainage from the wound.  Extreme redness or swelling around the incision site, " drainage of pus or foul smelling drainage.  Inability to urinate or empty your bladder within 8 hours.  Problems with breathing or chest pain.    You should call 911 if you develop problems with breathing or chest pain.  If you are unable to contact your doctor or surgical center, you should go to the nearest emergency room or urgent care center.  Physician's telephone #: [phone]    If any questions arise, call your doctor.  If your doctor is not available, please feel free to call the Surgical Center at [phone].  The Center is open Monday through Friday from 7AM to 7PM.  You can also call the [employer] HOTLINE open 24 hours/day, 7 days/week and speak to a nurse at [phone], or toll free at [phone].    A registered nurse may call you a few days after your surgery to see how you are doing after your procedure.    MEDICATIONS: Resume taking daily medication.  Take prescribed pain medication with food.  If no medication is prescribed, you may take non-aspirin pain medication if needed.  PAIN MEDICATION CAN BE VERY CONSTIPATING.  Take a stool softener or laxative such as senokot, pericolace, or milk of magnesia if needed.    Prescription given for NONE GIVEN.  Last pain medication given at ______.    If your physician has prescribed pain medication that includes Acetaminophen (Tylenol), do not take additional Acetaminophen (Tylenol) while taking the prescribed medication.    Depression / Suicide Risk    As you are discharged from this [facility] facility, it is important to learn how to keep safe from harming yourself.    Recognize the warning signs:  · Abrupt changes in personality, positive or negative- including increase in energy   · Giving away possessions  · Change in eating patterns- significant weight changes-  positive or negative  · Change in sleeping patterns- unable to sleep or sleeping all the time   · Unwillingness or inability to communicate  · Depression  · Unusual sadness,  discouragement and loneliness  · Talk of wanting to die  · Neglect of personal appearance   · Rebelliousness- reckless behavior  · Withdrawal from people/activities they love  · Confusion- inability to concentrate     If you or a loved one observes any of these behaviors or has concerns about self-harm, here's what you can do:  · Talk about it- your feelings and reasons for harming yourself  · Remove any means that you might use to hurt yourself (examples: pills, rope, extension cords, firearm)  · Get professional help from the community (Mental Health, Substance Abuse, psychological counseling)  · Do not be alone:Call your Safe Contact- someone whom you trust who will be there for you.  · Call your local CRISIS HOTLINE 920-0468 or 543-929-3338  · Call your local Children's Mobile Crisis Response Team [place] (976) 174-2536 or www.eyefactive  · Call the toll free National Suicide Prevention Hotlines   · National Suicide Prevention Lifeline 539-590-VQZJ (5072)  · National Hope Line Network 800-SUICIDE (441-7502)       Medication List      CONTINUE taking these medications        Instructions    ADCIRCA 20 MG Tabs   Generic drug:  Tadalafil (PAH)    Take 40 mg by mouth every bedtime. Indications: Pulmonary Arterial Hypertension   Dose:  40 mg       alprazolam 0.5 MG Tabs   Commonly known as:  XANAX    Doctor's comments:  Refill after 2/11/17   Take 1 Tab by mouth 3 times a day.   Dose:  0.5 mg       ambrisentan 10 MG tablet   Commonly known as:  LETAIRIS    Take 1 Tab by mouth every day.   Dose:  10 mg       aspirin EC 81 MG Tbec   Commonly known as:  ECOTRIN    Take 1 Tab by mouth every morning.   Dose:  81 mg       CELLCEPT 250 MG Caps   Generic drug:  mycophenolate    Take 500 mg by mouth 2 times a day.   Dose:  500 mg       CITRACAL MAXIMUM 315-250 MG-UNIT Tabs   Generic drug:  Calcium Citrate-Vitamin D    TAKE 2 TABS BY MOUTH 2X/ DAY WITH FOOD BEFORE AND AFTER DINNER FOR LIFE-CRUSH 1ST 3 MONTH, SPACE  MVI       cyclobenzaprine 10 MG Tabs   Commonly known as:  FLEXERIL    Take 1 Tab by mouth 2 times a day as needed.   Dose:  10 mg       docusate sodium 100 MG Caps   Commonly known as:  COLACE    Take 100 mg by mouth 2 times a day.   Dose:  100 mg       ferrous sulfate 325 (65 FE) MG EC tablet    Take 1 Tab by mouth 2 times a day.   Dose:  325 mg       fluticasone 50 MCG/ACT nasal spray   Commonly known as:  FLONASE    Spray 1 Spray in nose every day.   Dose:  1 Spray       furosemide 40 MG Tabs   Commonly known as:  LASIX    Take 40 mg by mouth every morning.   Dose:  40 mg       gabapentin 600 MG tablet   Commonly known as:  NEURONTIN    Doctor's comments:  Authorization of a refill request.   Take 1 Tab by mouth 3 times a day.   Dose:  600 mg       HUMALOG SC    Inject  as instructed as needed.       insulin glargine 100 UNIT/ML Soln   Commonly known as:  LANTUS    Inject  as instructed as needed (adjust as needed).       levothyroxine 137 MCG Tabs   Commonly known as:  SYNTHROID    Take 1 Tab by mouth every day.   Dose:  137 mcg       NYSTOP 268863 UNIT/GM Powd    Apply 1 g to affected area(s) 3 times a day.   Dose:  1 g       omeprazole 40 MG delayed-release capsule   Commonly known as:  PRILOSEC    Take 1 Cap by mouth every day.   Dose:  40 mg       ondansetron 4 MG Tbdp   Commonly known as:  ZOFRAN ODT    Take 1 Tab by mouth every 8 hours as needed for Nausea/Vomiting. Nausea and vomiting   Dose:  4 mg       oxycodone immediate release 10 MG immediate release tablet   Commonly known as:  ROXICODONE    Doctor's comments:  Refill after 3/15/17   Take 1-3 Tabs by mouth every 6 hours as needed for Moderate Pain.   Dose:  10-30 mg       pravastatin 20 MG Tabs   Commonly known as:  PRAVACHOL    Take 1 Tab by mouth every day.   Dose:  20 mg       predniSONE 5 MG Tabs   Commonly known as:  DELTASONE    Take 7 mg by mouth every morning.   Dose:  7 mg       sennosides 8.6 MG Tabs   Commonly known as:  SENOKOT    Take  17.2 mg by mouth 2 times a day.   Dose:  17.2 mg       sertraline 100 MG Tabs   Commonly known as:  ZOLOFT    Take 100 mg by mouth every bedtime.   Dose:  100 mg       tacrolimus 1 MG Caps   Commonly known as:  PROGRAF    Take 1.5-2 mg by mouth 2 times a day. 2 mg in the AM and 1.5 mg at 2000   Dose:  1.5-2 mg       tiotropium 18 MCG Caps   Commonly known as:  SPIRIVA    Inhale 1 Cap by mouth every day.   Dose:  18 mcg       trazodone 50 MG Tabs   Commonly known as:  DESYREL    Take 1-2 Tabs by mouth at bedtime as needed for Sleep.   Dose:   mg               Medication Information     Above and/or attached are the medications your physician expects you to take upon discharge. Review all of your home medications and newly ordered medications with your doctor and/or pharmacist. Follow medication instructions as directed by your doctor and/or pharmacist. Please keep your medication list with you and share with your physician. Update the information when medications are discontinued, doses are changed, or new medications (including over-the-counter products) are added; and carry medication information at all times in the event of emergency situations.        Resources     Quit Smoking / Tobacco Use:    I understand the use of any tobacco products increases my chance of suffering from future heart disease or stroke and could cause other illnesses which may shorten my life. Quitting the use of tobacco products is the single most important thing I can do to improve my health. For further information on smoking / tobacco cessation call a Toll Free Quit Line at 1-318.395.9838 (*[employer] [place]) or 1-317.773.4487 (American Lung Association) or you can access the web based program at www.lungusa.org.    [place] Tobacco Users Help Line:  (300) 776-5785       Toll Free: 1-559.303.4180  Quit Tobacco Program [facility] [phone]    Crisis Hotline:    [place] Crisis Hotline:  3-731-KLTUNMZ  or 1-942.889.7527    [place] Crisis Hotline:    1-215.688.3746 or 839-868-8660    Discharge Survey:   Thank you for choosing [facility]. We hope we did everything we could to make your hospital stay a pleasant one. You may be receiving a survey and we would appreciate your time and participation in answering the questions. Your input is very valuable to us in our efforts to improve our service to our patients and their families.            Signatures     My signature on this form indicates that:    1. I acknowledge receipt and understanding of these Home Care Instruction.  2. My questions regarding this information have been answered to my satisfaction.  3. I have formulated a plan with my discharge nurse to obtain my prescribed medications for home.    __________________________________      __________________________________                   Patient Signature                                 Guardian/Responsible Adult Signature      __________________________________                 __________       ________                       Nurse Signature                                               Date                 Time

## 2017-03-01 DIAGNOSIS — I10 ESSENTIAL HYPERTENSION: ICD-10-CM

## 2017-03-03 ENCOUNTER — TELEPHONE (OUTPATIENT)
Dept: MEDICAL GROUP | Facility: PHYSICIAN GROUP | Age: 57
End: 2017-03-03

## 2017-03-03 RX ORDER — FUROSEMIDE 40 MG/1
TABLET ORAL
Qty: 30 TAB | Refills: 3 | Status: SHIPPED | OUTPATIENT
Start: 2017-03-03 | End: 2017-06-16 | Stop reason: SDUPTHER

## 2017-03-07 ENCOUNTER — OFFICE VISIT (OUTPATIENT)
Dept: PULMONOLOGY | Facility: HOSPICE | Age: 57
End: 2017-03-07
Payer: MEDICARE

## 2017-03-07 VITALS
TEMPERATURE: 98.6 F | HEART RATE: 76 BPM | SYSTOLIC BLOOD PRESSURE: 116 MMHG | DIASTOLIC BLOOD PRESSURE: 72 MMHG | WEIGHT: 172 LBS | BODY MASS INDEX: 26.07 KG/M2 | RESPIRATION RATE: 16 BRPM | HEIGHT: 68 IN | OXYGEN SATURATION: 98 %

## 2017-03-07 DIAGNOSIS — R06.89 RESPIRATORY INSUFFICIENCY: ICD-10-CM

## 2017-03-07 DIAGNOSIS — I27.20 PULMONARY HYPERTENSION (HCC): ICD-10-CM

## 2017-03-07 DIAGNOSIS — Z98.890 HISTORY OF LUNG BIOPSY: ICD-10-CM

## 2017-03-07 DIAGNOSIS — D86.9 SARCOIDOSIS: ICD-10-CM

## 2017-03-07 DIAGNOSIS — Q21.11 OSTIUM SECUNDUM TYPE ATRIAL SEPTAL DEFECT: ICD-10-CM

## 2017-03-07 DIAGNOSIS — G47.31 CENTRAL SLEEP APNEA: ICD-10-CM

## 2017-03-07 DIAGNOSIS — G47.33 OBSTRUCTIVE SLEEP APNEA: ICD-10-CM

## 2017-03-07 DIAGNOSIS — J96.21 ACUTE AND CHRONIC RESPIRATORY FAILURE WITH HYPOXIA (HCC): ICD-10-CM

## 2017-03-07 DIAGNOSIS — D84.9 IMMUNOCOMPROMISED STATE (HCC): ICD-10-CM

## 2017-03-07 DIAGNOSIS — F41.9 ANXIETY: ICD-10-CM

## 2017-03-07 DIAGNOSIS — Z94.4 STATUS POST LIVER TRANSPLANT (HCC): ICD-10-CM

## 2017-03-07 DIAGNOSIS — R09.02 HYPOXEMIA: ICD-10-CM

## 2017-03-07 DIAGNOSIS — K76.81 HEPATOPULMONARY SYNDROME (HCC): ICD-10-CM

## 2017-03-07 PROCEDURE — 99215 OFFICE O/P EST HI 40 MIN: CPT | Performed by: INTERNAL MEDICINE

## 2017-03-07 PROCEDURE — 3017F COLORECTAL CA SCREEN DOC REV: CPT | Performed by: INTERNAL MEDICINE

## 2017-03-07 PROCEDURE — 3014F SCREEN MAMMO DOC REV: CPT | Performed by: INTERNAL MEDICINE

## 2017-03-07 PROCEDURE — G8419 CALC BMI OUT NRM PARAM NOF/U: HCPCS | Performed by: INTERNAL MEDICINE

## 2017-03-07 PROCEDURE — G8432 DEP SCR NOT DOC, RNG: HCPCS | Performed by: INTERNAL MEDICINE

## 2017-03-07 PROCEDURE — 1036F TOBACCO NON-USER: CPT | Performed by: INTERNAL MEDICINE

## 2017-03-07 PROCEDURE — G8484 FLU IMMUNIZE NO ADMIN: HCPCS | Performed by: INTERNAL MEDICINE

## 2017-03-07 RX ORDER — POLYETHYLENE GLYCOL-3350 AND ELECTROLYTES WITH FLAVOR PACK 240; 5.84; 2.98; 6.72; 22.72 G/278.26G; G/278.26G; G/278.26G; G/278.26G; G/278.26G
POWDER, FOR SOLUTION ORAL
COMMUNITY
End: 2017-03-21

## 2017-03-07 RX ORDER — SENNOSIDES A AND B 8.6 MG/1
TABLET, FILM COATED ORAL
COMMUNITY
End: 2017-03-21

## 2017-03-07 RX ORDER — ACETAMINOPHEN 500 MG
TABLET ORAL
COMMUNITY
End: 2017-03-19

## 2017-03-07 RX ORDER — WHEAT DEXTRIN 3 G/3.8 G
POWDER (GRAM) ORAL
COMMUNITY
End: 2017-03-21

## 2017-03-07 RX ORDER — PREDNISONE 1 MG/1
TABLET ORAL
Refills: 5 | COMMUNITY
Start: 2017-02-11 | End: 2017-03-19

## 2017-03-07 RX ORDER — HYDROMORPHONE HYDROCHLORIDE 8 MG/1
8 TABLET ORAL
COMMUNITY
End: 2017-03-21

## 2017-03-07 RX ORDER — DOCUSATE SODIUM 100 MG/1
100 CAPSULE, LIQUID FILLED ORAL
COMMUNITY
Start: 2016-11-10 | End: 2017-03-19

## 2017-03-07 RX ORDER — TACROLIMUS 0.5 MG/1
CAPSULE ORAL
COMMUNITY
End: 2017-03-21

## 2017-03-07 RX ORDER — PRAVASTATIN SODIUM 20 MG
20 TABLET ORAL
COMMUNITY
End: 2017-03-19

## 2017-03-07 RX ORDER — SENNOSIDES A AND B 8.6 MG/1
TABLET, FILM COATED ORAL
COMMUNITY
End: 2017-03-19

## 2017-03-07 RX ORDER — TIOTROPIUM BROMIDE 18 UG/1
18 CAPSULE ORAL; RESPIRATORY (INHALATION)
COMMUNITY
End: 2017-03-19

## 2017-03-07 RX ORDER — SERTRALINE HYDROCHLORIDE 100 MG/1
100 TABLET, FILM COATED ORAL DAILY
COMMUNITY
End: 2017-03-19

## 2017-03-07 RX ORDER — TACROLIMUS 0.5 MG/1
1.5 CAPSULE ORAL
COMMUNITY
Start: 2016-11-11 | End: 2017-03-21

## 2017-03-07 NOTE — Clinical Note
Gwyn Gaming M.D.  [facility]   [address] [place] [address] [place]  [place] [address]  Phone: [phone] - Fax: [phone]           Encounter Date: 3/7/2017  Provider: Gwyn Gaming M.D.  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 She has a very complex history. She has a history of biopsy-proven sarcoidosis. She also has a history of cirrhosis status post liver transplantation in December 2011 with chronic immunosuppression. Her medications include mycophenolate, tacrolimus, and prednisone at 7 mg per day. She has a history of hepatopulmonary syndrome with pulmonary hypertension and possible microvascular shunting. She has had an ASD repair that was done percutaneously. She has been hospitalized multiple times for various infections including pneumonia and tracheobronchitis. There is possibility that some of these events are due to microaspiration. She is on supplemental oxygen in the range of 5-6 L/m via nasal cannula. She has a history of complex sleep apnea. She has been treated with auto SV. She has a history of pulmonary hypertension with improvement on letairis and tadalafil. She has had previous bariatric surgery.    Recently she was hospitalized at [facility]. She had bronchoscopy and eventually a wedge biopsy of the right lung which was initially interpreted at [facility] and [facility] as consistent with bronchiolitis obliterans. She has been followed by Dr. Arango at [facility] for her sarcoidosis. Her case was presented at a multidisciplinary conference and it was felt that the pathology was not consistent with bronchiolitis obliterans or any other interstitial lung disease. He recommended to continue  "follow-up with pulmonary function testing and CT scanning as indicated. They are seeking to schedule her for a shunt evaluation at [facility].    Because of the possibility of microaspiration she has been on Prilosec. She actually feels much better over the past several months. She was hospitalized briefly with a small bowel obstruction in January of this year. This was treated conservatively with an NG tube and resolved. She also had a breast biopsy recently which was benign.    She is hoping to travel to [facility] this summer. Her oxygen needs here in [place] on range of motion 5-6 L/m. When she does go to the [place] she was able to reduce her O2 supplementation to about 3 L/m. When she drives over [employer] she finds that she needs to increase her oxygen to about 7 L/m to maintain adequate saturations. [employer] has the ability to supply oxygen in the range of 12 L/m.      Past Medical History   Diagnosis Date   • Cirrhosis (CMS-HCC) December 2011     [place] [facility]   • S/P cholecystectomy    • GERD (gastroesophageal reflux disease)          • Psychiatric disorder      Mood disorder   • Fracture of left foot    • Bronchitis       2016   • CKD (chronic kidney disease) stage 3, GFR 30-59 ml/min    • Breath shortness    • Chronic fatigue and malaise    • VRE (vancomycin-resistant Enterococci)      02-17-17, pt declines knowledge of this   • Low back pain 02-17-17     and left foot, 7/10   • Pneumonia      aspiration,    • Mild aortic regurgitation and aortic valve sclerosis     • Splenomegaly    • Small bowel obstruction (CMS-HCC)      01-   • On home oxygen therapy      4 liters, Dr. Gaming   • Pulmonary hypertension (CMS-HCC)         • Diabetes ([facility])      reports hx of, resolved w/weight loss. Reports still checking bloodsugars twice daily and using insulin PRN   • Hypothyroid    • H/O Clostridium difficile infection 02-17-17     reports \"16 months ago\". Current stool sample " 01-26-17 neg   • Platelet disorder (CMS-[facility])      low platelets       ROS:   Constitutional: Denies fevers, chills, night sweats, fatigue or weight loss  Eyes: Denies vision loss, pain, drainage, double vision  Ears, Nose, Throat: Denies earache, tinnitus, hoarseness  Cardiovascular: Denies chest pain, tightness, palpitations  Respiratory: Chronic dyspnea. She is on supplemental oxygen.  Sleep: Chronic complex sleep apnea  GI: Denies abdominal pain, nausea, vomiting, diarrhea at this time  : Denies frequent urination, hematuria, painful urination  Musculoskeletal: Denies back pain, painful joints, sore muscles  Neurological: Denies headaches, seizures  Skin: Denies rashes, color changes  Psychiatric: Denies depression or thoughts of suicide  Hematologic: Denies bleeding tendency or clotting tendency  Allergic/Immunologic: Denies rhinitis, skin sensitivity    Social History     Social History   • Marital Status:      Spouse Name: N/A   • Number of Children: N/A   • Years of Education: N/A     Occupational History   • Not on file.     Social History Main Topics   • Smoking status: Passive Smoke Exposure - Never Smoker   • Smokeless tobacco: Never Used      Comment: avoid all tobacco products   • Alcohol Use: No      Comment: 05/2009 quit drinking   • Drug Use: No   • Sexual Activity: Not on file     Other Topics Concern   • Not on file     Social History Narrative     Rhubarb and Vancomycin hcl  Current Outpatient Prescriptions on File Prior to Visit   Medication Sig Dispense Refill   • furosemide (LASIX) 40 MG Tab TAKE 1 TAB BY MOUTH EVERY DAY. 30 Tab 3   • ferrous sulfate 325 (65 FE) MG EC tablet Take 1 Tab by mouth 2 times a day. 180 Tab 4   • aspirin EC (ECOTRIN) 81 MG Tablet Delayed Response Take 1 Tab by mouth every morning. 90 Tab 4   • gabapentin (NEURONTIN) 600 MG tablet Take 1 Tab by mouth 3 times a day. 270 Tab 4   • levothyroxine (SYNTHROID) 137 MCG Tab Take 1 Tab by mouth every day. 90 Tab 4   •  omeprazole (PRILOSEC) 40 MG delayed-release capsule Take 1 Cap by mouth every day. 90 Cap 4   • ondansetron (ZOFRAN ODT) 4 MG TABLET DISPERSIBLE Take 1 Tab by mouth every 8 hours as needed for Nausea/Vomiting. Nausea and vomiting 270 Tab 4   • alprazolam (XANAX) 0.5 MG Tab Take 1 Tab by mouth 3 times a day. 90 Tab 2   • cyclobenzaprine (FLEXERIL) 10 MG Tab Take 1 Tab by mouth 2 times a day as needed. 90 Tab 4   • trazodone (DESYREL) 50 MG Tab Take 1-2 Tabs by mouth at bedtime as needed for Sleep. 90 Tab 4   • tiotropium (SPIRIVA) 18 MCG Cap Inhale 1 Cap by mouth every day. 90 Cap 4   • fluticasone (FLONASE) 50 MCG/ACT nasal spray Spray 1 Spray in nose every day. 16 g 10   • oxycodone immediate release (ROXICODONE) 10 MG immediate release tablet Take 1-3 Tabs by mouth every 6 hours as needed for Moderate Pain. 90 Tab 0   • CITRACAL MAXIMUM 315-250 MG-UNIT Tab TAKE 2 TABS BY MOUTH 2X/ DAY WITH FOOD BEFORE AND AFTER DINNER FOR LIFE-CRUSH 1ST 3 MONTH, SPACE  Tab 0   • NYSTOP (MYCOSTATIN) 618165 UNIT/GM Powder Apply 1 g to affected area(s) 3 times a day. 45 g 10   • predniSONE (DELTASONE) 5 MG Tab Take 7 mg by mouth every morning.     • ambrisentan (LETAIRIS) 10 MG tablet Take 1 Tab by mouth every day. 30 Tab 11   • docusate sodium (COLACE) 100 MG Cap Take 100 mg by mouth 2 times a day.     • mycophenolate (CELLCEPT) 250 MG Cap Take 500 mg by mouth 2 times a day.     • pravastatin (PRAVACHOL) 20 MG Tab Take 1 Tab by mouth every day. 30 Tab 11   • Insulin Lispro (HUMALOG SC) Inject  as instructed as needed.     • sertraline (ZOLOFT) 100 MG Tab Take 100 mg by mouth every bedtime.     • insulin glargine (LANTUS) 100 UNIT/ML Solution Inject  as instructed as needed (adjust as needed).     • furosemide (LASIX) 40 MG Tab Take 40 mg by mouth every morning.     • Tadalafil, PAH, (ADCIRCA) 20 MG Tab Take 40 mg by mouth every bedtime. Indications: Pulmonary Arterial Hypertension     • tacrolimus (PROGRAF) 1 MG Cap Take  "1.5-2 mg by mouth 2 times a day. 2 mg in the AM and 1.5 mg at 2000     • sennosides (SENOKOT) 8.6 MG Tab Take 17.2 mg by mouth 2 times a day.       No current facility-administered medications on file prior to visit.     Blood pressure 116/72, pulse 76, temperature 37 °C (98.6 °F), resp. rate 16, height 1.727 m (5' 7.99\"), weight 78.019 kg (172 lb), last menstrual period 01/03/2000, SpO2 98 %.  Family History   Problem Relation Age of Onset   • Other Father      Unknown (dead 10 years)   • Diabetes Father    • Heart Disease Father    • Hypertension Father    • Hyperlipidemia Father    • Stroke Father    • Non-contributory Mother    • Hyperlipidemia Mother    • Alcohol/Drug Mother    • Cancer Paternal Aunt    • Alcohol/Drug Maternal Grandmother    • Alcohol/Drug Maternal Grandfather        Physical Exam:  No distress on supplemental oxygen at rest.  HEENT: PERRLA, EOMI, no scleral icterus, no nasal or oral lesions  Neck: No thyromegaly, no adenopathy, no bruits  Mallampatti: Grade II  Lungs: Equal breath sounds, no wheezes or crackles  Heart: Regular rate and rhythm, no gallops or murmurs  Abdomen: Soft, benign, no organomegaly  Extremities: No clubbing, cyanosis, or edema  Neurologic: Cranial nerve, motor, and sensory exam are normal    1. Respiratory insufficiency    2. Hypoxemia    3. Pulmonary hypertension (CMS-HCC)    4. Status post liver transplant Dr. [place] [facility])    5. Sarcoidosis (CMS-HCC)    6. Hepatopulmonary syndrome (CMS-HCC)    7. Ostium secundum type atrial septal defect, S/P percutaneous closure    8. Immunocompromised state (CMS-HCC)    9. History of lung biopsy    10. Anxiety    11. Obstructive sleep apnea    12. Central sleep apnea    13. Acute and chronic respiratory failure with hypoxia (CMS-HCC)        At this time the consensus at [facility] appears to be that she does not have bronchiolitis obliterans.  Clinically she is doing better.  We had no plan to change her current medications.  She " will continue Prilosec for possible microaspiration.  We will continue on supplemental oxygen and ASV as she tolerates it at night.  I believe that if she has the ability to arrange for supplemental oxygen on her flight to [place] that she should be okay for travel. She will need 7-8 L/m at least.  We will see if we can set her up with a portable oxygen concentrator to be used at sea level during her travels.  We will also see if we can facilitate shunt evaluation at [facility].  We will see her back in May of this year prior to her planned travels.        Electronically signed by Gwyn Gaming M.D.  on 03/07/2017    No Recipients

## 2017-03-07 NOTE — MR AVS SNAPSHOT
"        Roxana Cuba   3/7/2017 3:00 PM   Office Visit   MRN: [id number]    Department:  [facility]   Dept Phone:  [phone]    Description:  Female : 1960   Provider:  Gwyn Gaming M.D.           Reason for Visit     Follow-Up Hypoxemia, COPD      Allergies as of 3/7/2017     Allergen Noted Reactions    Rhubarb 2009   Anaphylaxis    Vancomycin Hcl 2016       Causes red man syndrome when infused to fast        You were diagnosed with     Respiratory insufficiency   [195769]       Hypoxemia   [799.02.ICD-9-CM]       Pulmonary hypertension (CMS-HCC)   [073975]       Status post liver transplant (CMS-HCC)   [812142]       Sarcoidosis (CMS-HCC)   [135.ICD-9-CM]       Hepatopulmonary syndrome (CMS-HCC)   [573.5.ICD-9-CM]       Ostium secundum type atrial septal defect   [745.5.ICD-9-CM]       Immunocompromised state (CMS-HCC)   [709216]       History of lung biopsy   [9662638]       Anxiety   [975291]       Obstructive sleep apnea   [788574]       Central sleep apnea   [499528]       Acute and chronic respiratory failure with hypoxia (CMS-HCC)   [742806]         Vital Signs     Blood Pressure Pulse Temperature Respirations Height Weight    116/72 mmHg 76 37 °C (98.6 °F) 16 1.727 m (5' 7.99\") 78.019 kg (172 lb)    Body Mass Index Oxygen Saturation Last Menstrual Period Smoking Status          26.16 kg/m2 98% 2000 Passive Smoke Exposure - Never Smoker        Basic Information     Date Of Birth Sex Race Ethnicity Preferred Language    1960 Female White Non- English      Your appointments     Mar 08, 2017  8:00 AM   Adult Draw/Collection with LAB PH[facility]   LAB - [facility] (--)    [address]  [facility] [address] [phone]            Mar 23, 2017 11:00 AM   Telephone Appointment with [facility]LE   [facility] (--)    [address] [facility] [address] [phone]            2017 10:30 AM   FOLLOW UP with CLINT Oh " [facility] (--)    [address], [address]  [place] [address]   [phone]            Apr 05, 2017  8:00 AM   Adult Draw/Collection with LAB [facility]   LAB - [facility] (--)    [address] [place] [address]   [phone]            Apr 12, 2017 10:00 AM   Established Patient with Bernarda Reyes D.O.   [facility]    [address], [address]  [place] [address] [phone]           You will be receiving a confirmation call a few days before your appointment from our automated call confirmation system.            May 10, 2017  8:00 AM   Adult Draw/Collection with LAB FISH[facility]   [facility] - [facility] (--)    [address] [place] [address]   [phone]            Jun 07, 2017  8:00 AM   Adult Draw/Collection with LAB FISH[facility]   LAB - [facility] (--)    [address] [place] [address]   [phone]              Problem List              ICD-10-CM Priority Class Noted - Resolved    Status post liver transplant Dr. [place] ([facility]) Z94.4 Medium  3/13/2012 - Present    Hypothyroidism, adult E03.9 Low  3/13/2012 - Present    History of pancreatitis Z87.19 Medium  6/20/2012 - Present    H/O non-ST elevation myocardial infarction (NSTEMI) I25.2   5/16/2013 - Present    Lower extremity weakness M62.81 Low  6/26/2013 - Present    Anemia D64.9 Medium  9/13/2013 - Present    Anxiety F41.9 Low  11/4/2013 - Present    Insomnia G47.00 Low  11/4/2013 - Present    Sarcoidosis (CMS-HCC) D86.9 Medium  11/14/2013 - Present    Diabetes mellitus, type 2 (CMS-HCC) E11.9 Medium  11/14/2013 - Present    COPD (chronic obstructive pulmonary disease) (CMS-HCC) J44.9 High  11/14/2013 - Present    MGUS (monoclonal gammopathy of unknown significance) D47.2 Medium  11/14/2013 - Present    CKD (chronic kidney disease) stage 3, GFR 30-59 ml/min N18.3 Medium  2/25/2014 - Present    Hepatopulmonary syndrome (CMS-HCC) K76.81 Medium  3/5/2014 - Present    Ostium secundum type atrial  septal defect, S/P percutaneous closure Q21.1 Medium  3/17/2014 - Present    Mild aortic regurgitation and aortic valve sclerosis I35.1 High  3/17/2014 - Present    Vitamin D deficiency E55.9 Medium  8/26/2014 - Present    Chronic respiratory failure (CMS-HCC) J96.10 Medium  11/13/2014 - Present    Pure hypercholesterolemia E78.00 High  12/9/2014 - Present    Pulmonary hypertension (CMS-HCC) I27.2 High  2/3/2015 - Present    Essential hypertension, benign I10 Low  2/3/2015 - Present    On prednisone therapy Z79.52   7/6/2015 - Present    Mild mitral regurgitation I34.0   7/14/2015 - Present    Splenomegaly R16.1   7/14/2015 - Present    Immunocompromised state (CMS-HCC) D84.9   11/14/2015 - Present    Splenic lesion D73.9   11/14/2015 - Present    Hx of gastric bypass Z98.890   1/6/2016 - Present    Back pain M54.9   2/22/2016 - Present    Long term prescription benzodiazepine use Z79.899   4/12/2016 - Present    Chronic pain syndrome G89.4   6/15/2016 - Present    Other allergic rhinitis J30.89   7/22/2016 - Present    Long term prescription opiate use Z79.891   7/26/2016 - Present    Thrombocytopenia (CMS-HCC) D69.6   8/15/2016 - Present    History of lung biopsy Z98.890   10/17/2016 - Present    SBO (small bowel obstruction) (CMS-HCC) K56.69   1/3/2017 - Present    GERD (gastroesophageal reflux disease) K21.9   1/4/2017 - Present    Nausea & vomiting R11.2   1/16/2017 - Present    Iron deficiency E61.1   1/20/2017 - Present    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   2/21/2017 - Present      Health Maintenance        Date Due Completion Dates    IMM HEP B VACCINE (1 of 3 - Primary Series) 1960 ---    IMM DTaP/Tdap/Td Vaccine (1 - Tdap) 2/12/1979 ---    IMM INFLUENZA (1) 1/30/2018 (Originally 9/1/2016) 9/28/2015, 9/28/2015, 10/14/2014, 10/1/2014, 9/16/2013, 9/16/2013, 9/27/2012, 9/27/2012, 10/10/2011, 10/1/2011, 10/19/2010    DIABETES MONOFILAMENT / LE EXAM 2/13/2018 (Originally 1960) ---       RETINAL SCREENING 4/1/2017 4/1/2016, 1/12/2015    A1C SCREENING 7/11/2017 1/11/2017, 10/19/2016, 6/3/2016, 3/2/2016, 10/16/2015, 6/2/2015, 5/19/2015, 5/6/2014, 4/1/2014, 11/14/2013, 3/26/2013, 3/22/2013, 3/5/2013, 9/12/2012, 5/20/2012, 4/9/2012, 3/19/2012, 2/12/2012, 2/5/2012    FASTING LIPID PROFILE 11/23/2017 11/23/2016, 8/23/2016, 7/5/2016, 6/3/2016, 6/3/2016, 3/2/2016, 12/7/2015, 10/16/2015, 6/2/2015, 3/2/2015, 3/1/2015, 12/22/2014, 3/5/2014, 3/19/2012, 3/3/2010, 8/5/2009, 7/29/2009    URINE ACR / MICROALBUMIN 1/11/2018 1/11/2017, 3/19/2012    MAMMOGRAM 2/1/2018 2/1/2017, 12/13/2016, 11/2/2015, 10/24/2014, 10/24/2014, 10/20/2014, 8/4/2013 (Done)    Override on 8/4/2013: Done ([facility])    SERUM CREATININE 2/15/2018 2/15/2017, 2/15/2017, 1/17/2017, 1/16/2017, 1/14/2017, 1/11/2017, 1/11/2017, 1/11/2017, 1/5/2017, 1/4/2017, 1/3/2017, 12/21/2016, 12/7/2016, 11/23/2016, 11/23/2016, 10/26/2016, 10/19/2016, 10/19/2016, 10/12/2016, 10/7/2016, 9/6/2016, 9/5/2016, 9/5/2016, 9/4/2016, 9/3/2016, 9/2/2016, 9/2/2016, 9/1/2016, 8/23/2016, 8/23/2016, 8/20/2016, 8/16/2016, 8/15/2016, 8/14/2016, 8/2/2016, 7/22/2016, 7/19/2016, 7/18/2016, 7/17/2016, 7/16/2016, 7/15/2016, 7/14/2016, 7/13/2016, 7/12/2016, 7/11/2016, 7/10/2016, 7/5/2016, 7/5/2016, 6/15/2016, 6/14/2016, 6/3/2016, 6/3/2016, 6/3/2016, 5/24/2016, 5/23/2016, 5/14/2016, 5/11/2016, 5/10/2016, 5/9/2016, 5/8/2016, 5/7/2016, 5/6/2016, 5/5/2016, 5/4/2016, 5/3/2016, 5/2/2016, 5/1/2016, 4/30/2016, 4/29/2016, 4/6/2016, 4/6/2016, 3/2/2016, 3/2/2016, 3/2/2016, 2/26/2016, 2/25/2016, 2/24/2016, 2/23/2016, 2/22/2016, 2/19/2016, 2/3/2016, 1/5/2016, 12/7/2015, 12/1/2015, 11/30/2015, 11/24/2015, 11/17/2015, 11/16/2015, 11/15/2015, 11/13/2015, 11/5/2015, 10/16/2015, 10/7/2015, 9/8/2015, 8/25/2015, 8/21/2015, 8/19/2015, 8/12/2015, 7/28/2015, 6/30/2015, 6/16/2015, 6/2/2015, 5/19/2015, 5/5/2015, 4/21/2015, 4/7/2015, 3/22/2015, 3/18/2015, 3/17/2015, 3/12/2015, 3/9/2015, 3/4/2015,  3/2/2015, 3/1/2015, 2/27/2015, 2/17/2015, 2/3/2015, 1/20/2015, 1/6/2015, 12/22/2014, 12/19/2014, 12/9/2014, 11/25/2014, 11/18/2014, 11/17/2014, 11/16/2014, 11/14/2014, 11/13/2014, 11/10/2014, 10/28/2014, 10/14/2014, 9/29/2014, 9/16/2014, 9/2/2014, 8/19/2014, 8/5/2014, 7/21/2014, 7/8/2014, 6/24/2014, 6/16/2014, 6/11/2014, 6/3/2014, 5/20/2014, 5/6/2014, 5/4/2014, 5/2/2014, 4/28/2014, 4/27/2014, 4/25/2014, 4/22/2014, 3/21/2014, 3/17/2014, 3/5/2014, 3/4/2014, 2/25/2014, 2/24/2014, 2/22/2014, 2/18/2014, 1/27/2014, 1/15/2014, 1/7/2014, 1/7/2014, 1/4/2014, 1/3/2014, 12/9/2013, 11/23/2013, 11/22/2013, 11/21/2013, 11/20/2013, 11/18/2013, 11/15/2013, 11/14/2013, 11/13/2013, 11/4/2013, 10/28/2013, 10/4/2013, 10/1/2013, 9/30/2013, 9/29/2013, 9/27/2013, 9/19/2013, 9/18/2013, 9/16/2013, 9/15/2013, 9/14/2013, 9/12/2013, 9/3/2013, 8/16/2013, 8/6/2013, 8/2/2013, 8/1/2013, 7/30/2013, 7/29/2013, 7/28/2013, 7/27/2013, 7/26/2013, 7/25/2013, 7/2/2013, 6/30/2013    COLONOSCOPY 9/20/2020 9/20/2010            Current Immunizations     13-VALENT PCV PREVNAR 8/21/2016  9:40 AM, 8/21/2016    Influenza TIV (IM) 9/28/2015, 10/14/2014, 9/16/2013 11:43 PM, 9/27/2012 11:57 PM, 10/1/2011    Influenza Vaccine Quad Inj (Preserved) 9/28/2015, 10/1/2014, 9/16/2013, 9/27/2012, 10/10/2011, 10/19/2010    Pneumococcal polysaccharide vaccine (PPSV-23) 1/4/2017  6:09 AM, 1/4/2017, 10/8/2009  4:25 PM, 10/8/2009, 7/30/2009, 7/30/2009      Below and/or attached are the medications your provider expects you to take. Review all of your home medications and newly ordered medications with your provider and/or pharmacist. Follow medication instructions as directed by your provider and/or pharmacist. Please keep your medication list with you and share with your provider. Update the information when medications are discontinued, doses are changed, or new medications (including over-the-counter products) are added; and carry medication information at all times in the  event of emergency situations     Allergies:  RHUBARB - Anaphylaxis     VANCOMYCIN HCL - (reactions not documented)               Medications  Valid as of: March 07, 2017 -  4:06 PM    Generic Name Brand Name Tablet Size Instructions for use    Acetaminophen (Tab) TYLENOL 500 MG         ALPRAZolam (Tab) XANAX 0.5 MG Take 1 Tab by mouth 3 times a day.        Ambrisentan (Tab) LETAIRIS 10 MG Take 1 Tab by mouth every day.        Aspirin (Tablet Delayed Response) ECOTRIN 81 MG Take 1 Tab by mouth every morning.        Calcium Citrate-Vitamin D (Tab) CITRACAL MAXIMUM 315-250 MG-UNIT TAKE 2 TABS BY MOUTH 2X/ DAY WITH FOOD BEFORE AND AFTER DINNER FOR LIFE-CRUSH 1ST 3 MONTH, SPACE MVI        Cyclobenzaprine HCl (Tab) FLEXERIL 10 MG Take 1 Tab by mouth 2 times a day as needed.        Docusate Sodium (Cap) COLACE 100 MG Take 100 mg by mouth 2 times a day.        Docusate Sodium (Cap) COLACE 100 MG Take 100 mg by mouth.        Ferrous Sulfate (Tablet Delayed Response) ferrous sulfate 325 (65 FE) MG Take 1 Tab by mouth 2 times a day.        Fluticasone Furoate-Vilanterol (AEROSOL POWDER, BREATH ACTIVATED) Fluticasone Furoate-Vilanterol 100-25 MCG/INH         Fluticasone Propionate (Suspension) FLONASE 50 MCG/ACT Spray 1 Spray in nose every day.        Furosemide (Tab) LASIX 40 MG Take 40 mg by mouth every morning.        Furosemide (Tab) LASIX 40 MG TAKE 1 TAB BY MOUTH EVERY DAY.        Gabapentin (Tab) NEURONTIN 600 MG Take 1 Tab by mouth 3 times a day.        HYDROmorphone HCl (Tab) DILAUDID 8 MG Take 8 mg by mouth.        Insulin Glargine (Solution) LANTUS 100 UNIT/ML Inject  as instructed as needed (adjust as needed).        Insulin Glargine (Solution Pen-injector) LANTUS 100 UNIT/ML 8 Units by Subdermal route.        Insulin Lispro   Inject  as instructed as needed.        Insulin Lispro (Solution) HUMALOG 100 UNIT/ML Sensitive Regimen - follow the scale written on the discharge instructions        Lactobacillus  Acidophilus (Powder) Lactobacillus Acidophilus  Take  by mouth.        Levothyroxine Sodium (Tab) SYNTHROID 137 MCG Take 1 Tab by mouth every day.        Mycophenolate Mofetil (Cap) CELLCEPT 250 MG Take 500 mg by mouth 2 times a day.        Nystatin (Powder) MYCOSTATIN 514756 UNIT/GM Apply 1 g to affected area(s) 3 times a day.        Nystatin (Powder) MYCOSTATIN          Omeprazole (CAPSULE DELAYED RELEASE) PRILOSEC 40 MG Take 1 Cap by mouth every day.        Omeprazole (CAPSULE DELAYED RELEASE) PRILOSEC 40 MG         Ondansetron (TABLET DISPERSIBLE) ZOFRAN ODT 4 MG Take 1 Tab by mouth every 8 hours as needed for Nausea/Vomiting. Nausea and vomiting        OxyCODONE HCl (Tab) ROXICODONE 10 MG Take 1-3 Tabs by mouth every 6 hours as needed for Moderate Pain.        PEG 3350-KCl-NaBcb-NaCl-NaSulf (Recon Soln) GAVILYTE-C 240 G         Pravastatin Sodium (Tab) PRAVACHOL 20 MG Take 1 Tab by mouth every day.        Pravastatin Sodium (Tab) PRAVACHOL 20 MG Take 20 mg by mouth.        PredniSONE (Tab) DELTASONE 5 MG Take 7 mg by mouth every morning.        PredniSONE (Tab) DELTASONE 1 MG TAKE 1 TO 4 TABS BY MOUTH DAILY AS DIRECTED.        Probiotic Product           Sennosides (Tab) SENOKOT 8.6 MG Take 17.2 mg by mouth 2 times a day.        Sennosides (Tab) SENOKOT 8.6 MG Take  by mouth.        Sennosides (Tab) SENOKOT 8.6 MG Take  by mouth.        Sertraline HCl (Tab) ZOLOFT 100 MG Take 100 mg by mouth every bedtime.        Sertraline HCl (Tab) ZOLOFT 100 MG Take 100 mg by mouth every day.        Tacrolimus (Cap) PROGRAF 1 MG Take 1.5-2 mg by mouth 2 times a day. 2 mg in the AM and 1.5 mg at 2000        Tacrolimus (Cap) PROGRAF 0.5 MG         Tacrolimus (Cap) PROGRAF 0.5 MG Take 1.5 mg by mouth.        Tadalafil (PAH) (Tab) Tadalafil (PAH) 20 MG Take 40 mg by mouth every bedtime. Indications: Pulmonary Arterial Hypertension        Tiotropium Bromide Monohydrate (Cap) SPIRIVA 18 MCG Inhale 1 Cap by mouth every day.         Tiotropium Bromide Monohydrate (Cap) SPIRIVA 18 MCG 18 mcg by Nebulization route.        TraZODone HCl (Tab) DESYREL 50 MG Take 1-2 Tabs by mouth at bedtime as needed for Sleep.        Wheat Dextrin (Powder) BENEFIBER  Take  by mouth.        .                 Medicines prescribed today were sent to:     [id number] - [place], [place] - [address]    [address] [place] [address]    Phone: [phone] Fax: [phone]    Open 24 Hours?: No    [facility] [place], [place] - [address]    [address] [facility] [address]    Phone: [phone] Fax: [phone]    Open 24 Hours?: No      Medication refill instructions:       If your prescription bottle indicates you have medication refills left, it is not necessary to call your provider’s office. Please contact your pharmacy and they will refill your medication.    If your prescription bottle indicates you do not have any refills left, you may request refills at any time through one of the following ways: The online Gust system (except Urgent Care), by calling your provider’s office, or by asking your pharmacy to contact your provider’s office with a refill request. Medication refills are processed only during regular business hours and may not be available until the next business day. Your provider may request additional information or to have a follow-up visit with you prior to refilling your medication.   *Please Note: Medication refills are assigned a new Rx number when refilled electronically. Your pharmacy may indicate that no refills were authorized even though a new prescription for the same medication is available at the pharmacy. Please request the medicine by name with the pharmacy before contacting your provider for a refill.           Gust Access Code: Activation code not generated  Current Gust Status: Active

## 2017-03-08 ENCOUNTER — TELEPHONE (OUTPATIENT)
Dept: CARDIOLOGY | Facility: MEDICAL CENTER | Age: 57
End: 2017-03-08

## 2017-03-08 ENCOUNTER — HOSPITAL ENCOUNTER (OUTPATIENT)
Dept: LAB | Facility: MEDICAL CENTER | Age: 57
End: 2017-03-08
Attending: INTERNAL MEDICINE
Payer: MEDICARE

## 2017-03-08 LAB
ALBUMIN SERPL BCP-MCNC: 4.3 G/DL (ref 3.2–4.9)
ALBUMIN/GLOB SERPL: 1.8 G/DL
ALP SERPL-CCNC: 31 U/L (ref 30–99)
ALT SERPL-CCNC: 15 U/L (ref 2–50)
ANION GAP SERPL CALC-SCNC: 4 MMOL/L (ref 0–11.9)
AST SERPL-CCNC: 18 U/L (ref 12–45)
BASOPHILS # BLD AUTO: 0.03 K/UL (ref 0–0.12)
BASOPHILS NFR BLD AUTO: 0.8 % (ref 0–1.8)
BILIRUB SERPL-MCNC: 0.4 MG/DL (ref 0.1–1.5)
BUN SERPL-MCNC: 16 MG/DL (ref 8–22)
CALCIUM SERPL-MCNC: 9.8 MG/DL (ref 8.5–10.5)
CHLORIDE SERPL-SCNC: 102 MMOL/L (ref 96–112)
CO2 SERPL-SCNC: 33 MMOL/L (ref 20–33)
CREAT SERPL-MCNC: 1.25 MG/DL (ref 0.5–1.4)
EOSINOPHIL # BLD: 0.11 K/UL (ref 0–0.51)
EOSINOPHIL NFR BLD AUTO: 2.8 % (ref 0–6.9)
ERYTHROCYTE [DISTWIDTH] IN BLOOD BY AUTOMATED COUNT: 47.8 FL (ref 35.9–50)
GGT SERPL-CCNC: 9 U/L (ref 7–34)
GLOBULIN SER CALC-MCNC: 2.4 G/DL (ref 1.9–3.5)
GLUCOSE SERPL-MCNC: 94 MG/DL (ref 65–99)
HCT VFR BLD AUTO: 39.7 % (ref 37–47)
HGB BLD-MCNC: 12.4 G/DL (ref 12–16)
IMM GRANULOCYTES # BLD AUTO: 0.01 K/UL (ref 0–0.11)
IMM GRANULOCYTES NFR BLD AUTO: 0.3 % (ref 0–0.9)
LYMPHOCYTES # BLD: 1.01 K/UL (ref 1–4.8)
LYMPHOCYTES NFR BLD AUTO: 25.7 % (ref 22–41)
MAGNESIUM SERPL-MCNC: 2.1 MG/DL (ref 1.5–2.5)
MCH RBC QN AUTO: 28.1 PG (ref 27–33)
MCHC RBC AUTO-ENTMCNC: 31.2 G/DL (ref 33.6–35)
MCV RBC AUTO: 90 FL (ref 81.4–97.8)
MONOCYTES # BLD: 0.3 K/UL (ref 0–0.85)
MONOCYTES NFR BLD AUTO: 7.6 % (ref 0–13.4)
NEUTROPHILS # BLD: 2.47 K/UL (ref 2–7.15)
NEUTROPHILS NFR BLD AUTO: 62.8 % (ref 44–72)
NRBC # BLD AUTO: 0 K/UL
NRBC BLD-RTO: 0 /100 WBC
PLATELET # BLD AUTO: 84 K/UL (ref 164–446)
PMV BLD AUTO: 9.4 FL (ref 9–12.9)
POTASSIUM SERPL-SCNC: 4 MMOL/L (ref 3.6–5.5)
PROT SERPL-MCNC: 6.7 G/DL (ref 6–8.2)
RBC # BLD AUTO: 4.41 M/UL (ref 4.2–5.4)
SODIUM SERPL-SCNC: 139 MMOL/L (ref 135–145)
WBC # BLD AUTO: 3.9 K/UL (ref 4.8–10.8)

## 2017-03-08 PROCEDURE — 85025 COMPLETE CBC W/AUTO DIFF WBC: CPT

## 2017-03-08 PROCEDURE — 83735 ASSAY OF MAGNESIUM: CPT

## 2017-03-08 PROCEDURE — 82977 ASSAY OF GGT: CPT

## 2017-03-08 PROCEDURE — 80197 ASSAY OF TACROLIMUS: CPT

## 2017-03-08 PROCEDURE — 80053 COMPREHEN METABOLIC PANEL: CPT

## 2017-03-08 PROCEDURE — 36415 COLL VENOUS BLD VENIPUNCTURE: CPT

## 2017-03-08 NOTE — TELEPHONE ENCOUNTER
"----- Message from Karina Archuleta sent at 3/8/2017 10:03 AM PST -----  Regarding: \"GREAT NEWS\"  Contact: [phone]  ADRIANE/johanne  Pt calling to report doctor at [facility] (Dr Arango) sent her a letter about pt's lung biopsy to her & it's \"great news\", wants RO to have a copy.  How best to get that letter to him?    Also, pt got OK from local pulmonologist to take a [place] flight to [facility], with oxygen in mid-June, pt wants RO to approve of this or to discuss any issues he may have with this trip.  Please call pt at .  "

## 2017-03-08 NOTE — PROGRESS NOTES
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 She has a very complex history. She has a history of biopsy-proven sarcoidosis. She also has a history of cirrhosis status post liver transplantation in December 2011 with chronic immunosuppression. Her medications include mycophenolate, tacrolimus, and prednisone at 7 mg per day. She has a history of hepatopulmonary syndrome with pulmonary hypertension and possible microvascular shunting. She has had an ASD repair that was done percutaneously. She has been hospitalized multiple times for various infections including pneumonia and tracheobronchitis. There is possibility that some of these events are due to microaspiration. She is on supplemental oxygen in the range of 5-6 L/m via nasal cannula. She has a history of complex sleep apnea. She has been treated with auto SV. She has a history of pulmonary hypertension with improvement on letairis and tadalafil. She has had previous bariatric surgery.    Recently she was hospitalized at [facility]. She had bronchoscopy and eventually a wedge biopsy of the right lung which was initially interpreted at [facility] and [facility] as consistent with bronchiolitis obliterans. She has been followed by Dr. Arango at [facility] for her sarcoidosis. Her case was presented at a multidisciplinary conference and it was felt that the pathology was not consistent with bronchiolitis obliterans or any other interstitial lung disease. He recommended to continue follow-up with pulmonary function testing and CT scanning as indicated. They are seeking to schedule her for a shunt evaluation at [facility].    Because of the possibility of microaspiration she has been on Prilosec. She actually feels much better over the past several months. She was hospitalized briefly with a small bowel obstruction in January of this year. This was treated conservatively with  "an NG tube and resolved. She also had a breast biopsy recently which was benign.    She is hoping to travel to [facility] this summer. Her oxygen needs here in [place] on range of motion 5-6 L/m. When she does go to the [place] Area she was able to reduce her O2 supplementation to about 3 L/m. When she drives over [place] she finds that she needs to increase her oxygen to about 7 L/m to maintain adequate saturations. [facility] has the ability to supply oxygen in the range of 12 L/m.      Past Medical History   Diagnosis Date   • Cirrhosis (CMS-HCC) December 2011     [place] [facility]   • S/P cholecystectomy    • GERD (gastroesophageal reflux disease)          • Psychiatric disorder      Mood disorder   • Fracture of left foot    • Bronchitis       2016   • CKD (chronic kidney disease) stage 3, GFR 30-59 ml/min    • Breath shortness    • Chronic fatigue and malaise    • VRE (vancomycin-resistant Enterococci)      02-17-17, pt declines knowledge of this   • Low back pain 02-17-17     and left foot, 7/10   • Pneumonia      aspiration,    • Mild aortic regurgitation and aortic valve sclerosis     • Splenomegaly    • Small bowel obstruction (CMS-HCC)      01-   • On home oxygen therapy      4 liters, Dr. Gaming   • Pulmonary hypertension (CMS-HCC)         • Diabetes ([facility])      reports hx of, resolved w/weight loss. Reports still checking bloodsugars twice daily and using insulin PRN   • Hypothyroid    • H/O Clostridium difficile infection 02-17-17     reports \"16 months ago\". Current stool sample 01-26-17 neg   • Platelet disorder (CMS-HCC)      low platelets       ROS:   Constitutional: Denies fevers, chills, night sweats, fatigue or weight loss  Eyes: Denies vision loss, pain, drainage, double vision  Ears, Nose, Throat: Denies earache, tinnitus, hoarseness  Cardiovascular: Denies chest pain, tightness, palpitations  Respiratory: Chronic dyspnea. She is on supplemental oxygen.  Sleep: " Chronic complex sleep apnea  GI: Denies abdominal pain, nausea, vomiting, diarrhea at this time  : Denies frequent urination, hematuria, painful urination  Musculoskeletal: Denies back pain, painful joints, sore muscles  Neurological: Denies headaches, seizures  Skin: Denies rashes, color changes  Psychiatric: Denies depression or thoughts of suicide  Hematologic: Denies bleeding tendency or clotting tendency  Allergic/Immunologic: Denies rhinitis, skin sensitivity    Social History     Social History   • Marital Status:      Spouse Name: N/A   • Number of Children: N/A   • Years of Education: N/A     Occupational History   • Not on file.     Social History Main Topics   • Smoking status: Passive Smoke Exposure - Never Smoker   • Smokeless tobacco: Never Used      Comment: avoid all tobacco products   • Alcohol Use: No      Comment: 05/2009 quit drinking   • Drug Use: No   • Sexual Activity: Not on file     Other Topics Concern   • Not on file     Social History Narrative     Rhubarb and Vancomycin hcl  Current Outpatient Prescriptions on File Prior to Visit   Medication Sig Dispense Refill   • furosemide (LASIX) 40 MG Tab TAKE 1 TAB BY MOUTH EVERY DAY. 30 Tab 3   • ferrous sulfate 325 (65 FE) MG EC tablet Take 1 Tab by mouth 2 times a day. 180 Tab 4   • aspirin EC (ECOTRIN) 81 MG Tablet Delayed Response Take 1 Tab by mouth every morning. 90 Tab 4   • gabapentin (NEURONTIN) 600 MG tablet Take 1 Tab by mouth 3 times a day. 270 Tab 4   • levothyroxine (SYNTHROID) 137 MCG Tab Take 1 Tab by mouth every day. 90 Tab 4   • omeprazole (PRILOSEC) 40 MG delayed-release capsule Take 1 Cap by mouth every day. 90 Cap 4   • ondansetron (ZOFRAN ODT) 4 MG TABLET DISPERSIBLE Take 1 Tab by mouth every 8 hours as needed for Nausea/Vomiting. Nausea and vomiting 270 Tab 4   • alprazolam (XANAX) 0.5 MG Tab Take 1 Tab by mouth 3 times a day. 90 Tab 2   • cyclobenzaprine (FLEXERIL) 10 MG Tab Take 1 Tab by mouth 2 times a day as  "needed. 90 Tab 4   • trazodone (DESYREL) 50 MG Tab Take 1-2 Tabs by mouth at bedtime as needed for Sleep. 90 Tab 4   • tiotropium (SPIRIVA) 18 MCG Cap Inhale 1 Cap by mouth every day. 90 Cap 4   • fluticasone (FLONASE) 50 MCG/ACT nasal spray Spray 1 Spray in nose every day. 16 g 10   • oxycodone immediate release (ROXICODONE) 10 MG immediate release tablet Take 1-3 Tabs by mouth every 6 hours as needed for Moderate Pain. 90 Tab 0   • CITRACAL MAXIMUM 315-250 MG-UNIT Tab TAKE 2 TABS BY MOUTH 2X/ DAY WITH FOOD BEFORE AND AFTER DINNER FOR LIFE-CRUSH 1ST 3 MONTH, SPACE  Tab 0   • NYSTOP (MYCOSTATIN) 325538 UNIT/GM Powder Apply 1 g to affected area(s) 3 times a day. 45 g 10   • predniSONE (DELTASONE) 5 MG Tab Take 7 mg by mouth every morning.     • ambrisentan (LETAIRIS) 10 MG tablet Take 1 Tab by mouth every day. 30 Tab 11   • docusate sodium (COLACE) 100 MG Cap Take 100 mg by mouth 2 times a day.     • mycophenolate (CELLCEPT) 250 MG Cap Take 500 mg by mouth 2 times a day.     • pravastatin (PRAVACHOL) 20 MG Tab Take 1 Tab by mouth every day. 30 Tab 11   • Insulin Lispro (HUMALOG SC) Inject  as instructed as needed.     • sertraline (ZOLOFT) 100 MG Tab Take 100 mg by mouth every bedtime.     • insulin glargine (LANTUS) 100 UNIT/ML Solution Inject  as instructed as needed (adjust as needed).     • furosemide (LASIX) 40 MG Tab Take 40 mg by mouth every morning.     • Tadalafil, PAH, (ADCIRCA) 20 MG Tab Take 40 mg by mouth every bedtime. Indications: Pulmonary Arterial Hypertension     • tacrolimus (PROGRAF) 1 MG Cap Take 1.5-2 mg by mouth 2 times a day. 2 mg in the AM and 1.5 mg at 2000     • sennosides (SENOKOT) 8.6 MG Tab Take 17.2 mg by mouth 2 times a day.       No current facility-administered medications on file prior to visit.     Blood pressure 116/72, pulse 76, temperature 37 °C (98.6 °F), resp. rate 16, height 1.727 m (5' 7.99\"), weight 78.019 kg (172 lb), last menstrual period 01/03/2000, SpO2 98 " %.  Family History   Problem Relation Age of Onset   • Other Father      Unknown (dead 10 years)   • Diabetes Father    • Heart Disease Father    • Hypertension Father    • Hyperlipidemia Father    • Stroke Father    • Non-contributory Mother    • Hyperlipidemia Mother    • Alcohol/Drug Mother    • Cancer Paternal Aunt    • Alcohol/Drug Maternal Grandmother    • Alcohol/Drug Maternal Grandfather        Physical Exam:  No distress on supplemental oxygen at rest.  HEENT: PERRLA, EOMI, no scleral icterus, no nasal or oral lesions  Neck: No thyromegaly, no adenopathy, no bruits  Mallampatti: Grade II  Lungs: Equal breath sounds, no wheezes or crackles  Heart: Regular rate and rhythm, no gallops or murmurs  Abdomen: Soft, benign, no organomegaly  Extremities: No clubbing, cyanosis, or edema  Neurologic: Cranial nerve, motor, and sensory exam are normal    1. Respiratory insufficiency    2. Hypoxemia    3. Pulmonary hypertension (CMS-HCC)    4. Status post liver transplant Dr. Canada ([facility])    5. Sarcoidosis (CMS-HCC)    6. Hepatopulmonary syndrome (CMS-HCC)    7. Ostium secundum type atrial septal defect, S/P percutaneous closure    8. Immunocompromised state (CMS-HCC)    9. History of lung biopsy    10. Anxiety    11. Obstructive sleep apnea    12. Central sleep apnea    13. Acute and chronic respiratory failure with hypoxia (CMS-HCC)        At this time the consensus at [facility] appears to be that she does not have bronchiolitis obliterans.  Clinically she is doing better.  We had no plan to change her current medications.  She will continue Prilosec for possible microaspiration.  We will continue on supplemental oxygen and ASV as she tolerates it at night.  I believe that if she has the ability to arrange for supplemental oxygen on her flight to [place] that she should be okay for travel. She will need 7-8 L/m at least.  We will see if we can set her up with a portable oxygen concentrator to be [place].  We will also see if we can facilitate shunt evaluation at [facility].  We will see her back in May of this year prior to her planned travels.

## 2017-03-08 NOTE — TELEPHONE ENCOUNTER
Spoke with patient who is doing well and pleased with findings from [facility].  Letter is scanned to [employer].    She is traveling to [place] mid June and will be there 3 weeks.  She has ok from Pulmonary and would like Dr. Phan's recommendations regarding travel.  She would like to know soon to book flights.   To Dr. Phan.

## 2017-03-09 NOTE — TELEPHONE ENCOUNTER
Dr. Phan ok'd her to fly as planned to [facility].  She has been sent a My Chart message.

## 2017-03-10 LAB — TACROLIMUS BLD-MCNC: 2.2 NG/ML

## 2017-03-13 ENCOUNTER — TELEPHONE (OUTPATIENT)
Dept: PULMONOLOGY | Facility: HOSPICE | Age: 57
End: 2017-03-13

## 2017-03-13 DIAGNOSIS — R09.02 HYPOXEMIA: ICD-10-CM

## 2017-03-13 DIAGNOSIS — J96.21 ACUTE AND CHRONIC RESPIRATORY FAILURE WITH HYPOXIA (HCC): ICD-10-CM

## 2017-03-13 NOTE — TELEPHONE ENCOUNTER
"Pt called into find out if our office would do a prior auth for a \"special\" PFT with [employer], that is being order and performed at [facility]. Pt doesn't know exactly what is called.     Because of the lack of details, she is going to call [facility] and find out if they are obtaining auth.     She also would like an order for a POC that will accommodate 3 liters continuous. Please sign order/ap    "

## 2017-03-13 NOTE — TELEPHONE ENCOUNTER
Is the POC for travel to the [place] area? She normally requires 5-6L when in [place] and up to 7-8L when travelling over [facility]. She is due for OV in May with MD, preferrably Dr. Gaming.

## 2017-03-16 ENCOUNTER — TELEPHONE (OUTPATIENT)
Dept: PULMONOLOGY | Facility: HOSPICE | Age: 57
End: 2017-03-16

## 2017-03-16 DIAGNOSIS — R09.02 HYPOXEMIA: ICD-10-CM

## 2017-03-16 DIAGNOSIS — J96.21 ACUTE AND CHRONIC RESPIRATORY FAILURE WITH HYPOXIA (HCC): ICD-10-CM

## 2017-03-16 NOTE — TELEPHONE ENCOUNTER
Pt has found a company in [place] that will provide a concentrator while in [place]. Please sign pended order.    Last seen: 03/07/17  1.  Respiratory insufficiency     2.  Hypoxemia     3.  Pulmonary hypertension (CMS-HCC)     4.  Status post liver transplant Dr. Canada ([facility])     5.  Sarcoidosis (CMS-HCC)     6.  Hepatopulmonary syndrome (CMS-HCC)     7.  Ostium secundum type atrial septal defect, S/P percutaneous closure     8.  Immunocompromised state (CMS-HCC)     9.  History of lung biopsy     10.  Anxiety     11.  Obstructive sleep apnea     12.  Central sleep apnea     13.  Acute and chronic respiratory failure with hypoxia (CMS-HCC)

## 2017-03-19 ENCOUNTER — APPOINTMENT (OUTPATIENT)
Dept: RADIOLOGY | Facility: MEDICAL CENTER | Age: 57
End: 2017-03-19
Attending: EMERGENCY MEDICINE
Payer: MEDICARE

## 2017-03-19 ENCOUNTER — HOSPITAL ENCOUNTER (EMERGENCY)
Facility: MEDICAL CENTER | Age: 57
End: 2017-03-19
Attending: EMERGENCY MEDICINE
Payer: MEDICARE

## 2017-03-19 VITALS
DIASTOLIC BLOOD PRESSURE: 69 MMHG | BODY MASS INDEX: 27.34 KG/M2 | TEMPERATURE: 98.3 F | SYSTOLIC BLOOD PRESSURE: 137 MMHG | HEIGHT: 67 IN | RESPIRATION RATE: 18 BRPM | WEIGHT: 174.16 LBS | HEART RATE: 61 BPM | OXYGEN SATURATION: 97 %

## 2017-03-19 DIAGNOSIS — K59.09 OTHER CONSTIPATION: ICD-10-CM

## 2017-03-19 LAB
ALBUMIN SERPL BCP-MCNC: 4 G/DL (ref 3.2–4.9)
ALBUMIN/GLOB SERPL: 1.7 G/DL
ALP SERPL-CCNC: 29 U/L (ref 30–99)
ALT SERPL-CCNC: 20 U/L (ref 2–50)
ANION GAP SERPL CALC-SCNC: 6 MMOL/L (ref 0–11.9)
AST SERPL-CCNC: 25 U/L (ref 12–45)
BASOPHILS # BLD AUTO: 0.7 % (ref 0–1.8)
BASOPHILS # BLD: 0.03 K/UL (ref 0–0.12)
BILIRUB SERPL-MCNC: 0.5 MG/DL (ref 0.1–1.5)
BUN SERPL-MCNC: 15 MG/DL (ref 8–22)
CALCIUM SERPL-MCNC: 8.8 MG/DL (ref 8.4–10.2)
CHLORIDE SERPL-SCNC: 100 MMOL/L (ref 96–112)
CO2 SERPL-SCNC: 30 MMOL/L (ref 20–33)
CREAT SERPL-MCNC: 1.13 MG/DL (ref 0.5–1.4)
EOSINOPHIL # BLD AUTO: 0.12 K/UL (ref 0–0.51)
EOSINOPHIL NFR BLD: 2.8 % (ref 0–6.9)
ERYTHROCYTE [DISTWIDTH] IN BLOOD BY AUTOMATED COUNT: 47 FL (ref 35.9–50)
GFR SERPL CREATININE-BSD FRML MDRD: 50 ML/MIN/1.73 M 2
GLOBULIN SER CALC-MCNC: 2.4 G/DL (ref 1.9–3.5)
GLUCOSE SERPL-MCNC: 157 MG/DL (ref 65–99)
HCT VFR BLD AUTO: 37 % (ref 37–47)
HGB BLD-MCNC: 12.3 G/DL (ref 12–16)
IMM GRANULOCYTES # BLD AUTO: 0.01 K/UL (ref 0–0.11)
IMM GRANULOCYTES NFR BLD AUTO: 0.2 % (ref 0–0.9)
LIPASE SERPL-CCNC: 22 U/L (ref 7–58)
LYMPHOCYTES # BLD AUTO: 0.99 K/UL (ref 1–4.8)
LYMPHOCYTES NFR BLD: 22.8 % (ref 22–41)
MCH RBC QN AUTO: 29.4 PG (ref 27–33)
MCHC RBC AUTO-ENTMCNC: 33.2 G/DL (ref 33.6–35)
MCV RBC AUTO: 88.5 FL (ref 81.4–97.8)
MONOCYTES # BLD AUTO: 0.23 K/UL (ref 0–0.85)
MONOCYTES NFR BLD AUTO: 5.3 % (ref 0–13.4)
NEUTROPHILS # BLD AUTO: 2.96 K/UL (ref 2–7.15)
NEUTROPHILS NFR BLD: 68.2 % (ref 44–72)
NRBC # BLD AUTO: 0 K/UL
NRBC BLD AUTO-RTO: 0 /100 WBC
PLATELET # BLD AUTO: 73 K/UL (ref 164–446)
PMV BLD AUTO: 9.2 FL (ref 9–12.9)
POTASSIUM SERPL-SCNC: 3.9 MMOL/L (ref 3.6–5.5)
PROT SERPL-MCNC: 6.4 G/DL (ref 6–8.2)
RBC # BLD AUTO: 4.18 M/UL (ref 4.2–5.4)
SODIUM SERPL-SCNC: 136 MMOL/L (ref 135–145)
WBC # BLD AUTO: 4.3 K/UL (ref 4.8–10.8)

## 2017-03-19 PROCEDURE — 96375 TX/PRO/DX INJ NEW DRUG ADDON: CPT

## 2017-03-19 PROCEDURE — 80053 COMPREHEN METABOLIC PANEL: CPT

## 2017-03-19 PROCEDURE — 96374 THER/PROPH/DIAG INJ IV PUSH: CPT

## 2017-03-19 PROCEDURE — 304561 HCHG STAT O2

## 2017-03-19 PROCEDURE — 74022 RADEX COMPL AQT ABD SERIES: CPT

## 2017-03-19 PROCEDURE — 700101 HCHG RX REV CODE 250

## 2017-03-19 PROCEDURE — 99285 EMERGENCY DEPT VISIT HI MDM: CPT

## 2017-03-19 PROCEDURE — 96376 TX/PRO/DX INJ SAME DRUG ADON: CPT

## 2017-03-19 PROCEDURE — 700105 HCHG RX REV CODE 258: Performed by: EMERGENCY MEDICINE

## 2017-03-19 PROCEDURE — 85025 COMPLETE CBC W/AUTO DIFF WBC: CPT

## 2017-03-19 PROCEDURE — 36415 COLL VENOUS BLD VENIPUNCTURE: CPT

## 2017-03-19 PROCEDURE — 700111 HCHG RX REV CODE 636 W/ 250 OVERRIDE (IP): Performed by: EMERGENCY MEDICINE

## 2017-03-19 PROCEDURE — 83690 ASSAY OF LIPASE: CPT

## 2017-03-19 RX ORDER — SODIUM CHLORIDE 9 MG/ML
1000 INJECTION, SOLUTION INTRAVENOUS ONCE
Status: COMPLETED | OUTPATIENT
Start: 2017-03-19 | End: 2017-03-19

## 2017-03-19 RX ORDER — ONDANSETRON 2 MG/ML
4 INJECTION INTRAMUSCULAR; INTRAVENOUS ONCE
Status: COMPLETED | OUTPATIENT
Start: 2017-03-19 | End: 2017-03-19

## 2017-03-19 RX ORDER — TADALAFIL 20 MG/1
1 TABLET ORAL
Status: SHIPPED | COMMUNITY
End: 2017-03-21

## 2017-03-19 RX ADMIN — ONDANSETRON 4 MG: 2 INJECTION, SOLUTION INTRAMUSCULAR; INTRAVENOUS at 17:41

## 2017-03-19 RX ADMIN — HYDROMORPHONE HYDROCHLORIDE 1 MG: 1 INJECTION, SOLUTION INTRAMUSCULAR; INTRAVENOUS; SUBCUTANEOUS at 22:58

## 2017-03-19 RX ADMIN — POLYETHYLENE GLYCOL 3350, SODIUM SULFATE ANHYDROUS, SODIUM BICARBONATE, SODIUM CHLORIDE, POTASSIUM CHLORIDE 4 L: 236; 22.74; 6.74; 5.86; 2.97 POWDER, FOR SOLUTION ORAL at 22:30

## 2017-03-19 RX ADMIN — HYDROMORPHONE HYDROCHLORIDE 1 MG: 1 INJECTION, SOLUTION INTRAMUSCULAR; INTRAVENOUS; SUBCUTANEOUS at 17:41

## 2017-03-19 RX ADMIN — SODIUM CHLORIDE 1000 ML: 9 INJECTION, SOLUTION INTRAVENOUS at 21:30

## 2017-03-19 RX ADMIN — HYDROMORPHONE HYDROCHLORIDE 1 MG: 1 INJECTION, SOLUTION INTRAMUSCULAR; INTRAVENOUS; SUBCUTANEOUS at 18:56

## 2017-03-19 RX ADMIN — SODIUM CHLORIDE 1000 ML: 9 INJECTION, SOLUTION INTRAVENOUS at 17:41

## 2017-03-19 ASSESSMENT — PAIN SCALES - GENERAL
PAINLEVEL_OUTOF10: 5
PAINLEVEL_OUTOF10: 2

## 2017-03-19 NOTE — ED AVS SNAPSHOT
Home Care Instructions                                                                                                                Roxana Cuba   MRN: [id number]    Department:  [facility], Emergency Dept   Date of Visit:  3/19/2017            [facility], Emergency Dept    [address]    [place] [address]    Phone:  [phone]      You were seen by     Silviano Koehler M.D.      Your Diagnosis Was     Other constipation     K59.09       These are the medications you received during your hospitalization from 03/19/2017 1644 to 03/19/2017 2321     Date/Time Order Dose Route Action    03/19/2017 1741 NS infusion 1,000 mL 1,000 mL Intravenous New Bag    03/19/2017 1741 HYDROmorphone (DILAUDID) injection 1 mg 1 mg Intravenous Given    03/19/2017 1741 ondansetron (ZOFRAN) syringe/vial injection 4 mg 4 mg Intravenous Given    03/19/2017 1856 HYDROmorphone (DILAUDID) injection 1 mg 1 mg Intravenous Given    03/19/2017 2130 NS infusion 1,000 mL 1,000 mL Intravenous New Bag    03/19/2017 2258 HYDROmorphone (DILAUDID) injection 1 mg 1 mg Intravenous Given      Follow-up Information     1. Follow up with Bernarda Reyes D.O..    Specialty:  Family Medicine    Contact information    [facility] [facility]  [address]  [place] [address] [phone]        Medication Information     Review all of your home medications and newly ordered medications with your primary doctor and/or pharmacist as soon as possible. Follow medication instructions as directed by your doctor and/or pharmacist.     Please keep your complete medication list with you and share with your physician. Update the information when medications are discontinued, doses are changed, or new medications (including over-the-counter products) are added; and carry medication information at all times in the event of emergency situations.               Medication List      ASK your doctor about these  medications        Instructions    Morning Afternoon Evening Bedtime    * ADCIRCA 20 MG Tabs   Generic drug:  Tadalafil (PAH)        Take 40 mg by mouth every bedtime. Indications: Pulmonary Arterial Hypertension   Dose:  40 mg                        * ADCIRCA 20 MG Tabs   Generic drug:  Tadalafil (PAH)        Take 1 Tab by mouth every bedtime.   Dose:  1 Tab                        alprazolam 0.5 MG Tabs   Commonly known as:  XANAX        Doctor's comments:  Refill after 2/11/17   Take 1 Tab by mouth 3 times a day.   Dose:  0.5 mg                        ambrisentan 10 MG tablet   Commonly known as:  LETAIRIS        Take 1 Tab by mouth every day.   Dose:  10 mg                        aspirin EC 81 MG Tbec   Commonly known as:  ECOTRIN        Take 1 Tab by mouth every morning.   Dose:  81 mg                        BENEFIBER Powd        Take  by mouth.                        CELLCEPT 250 MG Caps   Generic drug:  mycophenolate        Take 500 mg by mouth 2 times a day.   Dose:  500 mg                        CITRACAL MAXIMUM 315-250 MG-UNIT Tabs   Generic drug:  Calcium Citrate-Vitamin D        TAKE 2 TABS BY MOUTH 2X/ DAY WITH FOOD BEFORE AND AFTER DINNER FOR LIFE-CRUSH 1ST 3 MONTH, SPACE MVI                        cyclobenzaprine 10 MG Tabs   Commonly known as:  FLEXERIL        Take 1 Tab by mouth 2 times a day as needed.   Dose:  10 mg                        docusate sodium 100 MG Caps   Commonly known as:  COLACE        Take 100 mg by mouth 2 times a day.   Dose:  100 mg                        ferrous sulfate 325 (65 FE) MG EC tablet        Take 1 Tab by mouth 2 times a day.   Dose:  325 mg                        fluticasone 50 MCG/ACT nasal spray   Commonly known as:  FLONASE        Spray 1 Spray in nose every day.   Dose:  1 Spray                        furosemide 40 MG Tabs   Commonly known as:  LASIX        Take 40 mg by mouth every morning.   Dose:  40 mg                        gabapentin 600 MG tablet      Commonly known as:  NEURONTIN        Doctor's comments:  Authorization of a refill request.   Take 1 Tab by mouth 3 times a day.   Dose:  600 mg                        GAVILYTE-C 240 G solution   Generic drug:  polyethylene glycol                             * HUMALOG SC        Inject  as instructed as needed.                        * HUMALOG 100 UNIT/ML Soln   Generic drug:  insulin lispro        Sensitive Regimen - follow the scale written on the discharge instructions                        HYDROmorphone 8 MG tablet   Commonly known as:  DILAUDID        Take 8 mg by mouth.   Dose:  8 mg                        * insulin glargine 100 UNIT/ML Soln   Commonly known as:  LANTUS        Inject  as instructed as needed (adjust as needed).                        * LANTUS SOLOSTAR 100 UNIT/ML Sopn injection   Generic drug:  insulin glargine        8 Units by Subdermal route.   Dose:  8 Units                        Lactobacillus Acidophilus Powd        Take  by mouth.                        levothyroxine 137 MCG Tabs   Commonly known as:  SYNTHROID        Take 1 Tab by mouth every day.   Dose:  137 mcg                        nystatin Powd   Commonly known as:  MYCOSTATIN                             omeprazole 40 MG delayed-release capsule   Commonly known as:  PRILOSEC        Take 1 Cap by mouth every day.   Dose:  40 mg                        ondansetron 4 MG Tbdp   Commonly known as:  ZOFRAN ODT        Take 1 Tab by mouth every 8 hours as needed for Nausea/Vomiting. Nausea and vomiting   Dose:  4 mg                        oxycodone immediate release 10 MG immediate release tablet   Commonly known as:  ROXICODONE        Doctor's comments:  Refill after 3/15/17   Take 1-3 Tabs by mouth every 6 hours as needed for Moderate Pain.   Dose:  10-30 mg                        pravastatin 20 MG Tabs   Commonly known as:  PRAVACHOL        Take 1 Tab by mouth every day.   Dose:  20 mg                        predniSONE 5 MG Tabs    Commonly known as:  DELTASONE        Take 7 mg by mouth every morning.   Dose:  7 mg                        * sennosides 8.6 MG Tabs   Commonly known as:  SENOKOT        Take 17.2 mg by mouth 2 times a day.   Dose:  17.2 mg                        * sennosides 8.6 MG Tabs   Commonly known as:  SENOKOT        Take  by mouth.                        sertraline 100 MG Tabs   Commonly known as:  ZOLOFT        Take 100 mg by mouth every bedtime.   Dose:  100 mg                        * tacrolimus 1 MG Caps   Commonly known as:  PROGRAF        Take 1.5-2 mg by mouth 2 times a day. 2 mg in the AM and 1.5 mg at 2000   Dose:  1.5-2 mg                        * tacrolimus 0.5 MG Caps   Commonly known as:  PROGRAF                             * tacrolimus 0.5 MG Caps   Commonly known as:  PROGRAF        Take 1.5 mg by mouth.   Dose:  1.5 mg                        tiotropium 18 MCG Caps   Commonly known as:  SPIRIVA        Inhale 1 Cap by mouth every day.   Dose:  18 mcg                        trazodone 50 MG Tabs   Commonly known as:  DESYREL        Take 1-2 Tabs by mouth at bedtime as needed for Sleep.   Dose:   mg                        * Notice:  This list has 11 medication(s) that are the same as other medications prescribed for you. Read the directions carefully, and ask your doctor or other care provider to review them with you.            Procedures and tests performed during your visit     Procedure/Test Number of Times Performed    CBC WITH DIFFERENTIAL 1    COMP METABOLIC PANEL 1    DX-ABDOMEN COMPLETE WITH AP OR PA CXR 2    ESTIMATED GFR 1    LIPASE 1    NURSING COMMUNICATION 1    Nursing Communication 1        Discharge Instructions       Take golytely tomorrow.  Constipation, Adult  Constipation is when a person has fewer than three bowel movements a week, has difficulty having a bowel movement, or has stools that are dry, hard, or larger than normal. As people grow older, constipation is more common. A  low-fiber diet, not taking in enough fluids, and taking certain medicines may make constipation worse.   CAUSES   · Certain medicines, such as antidepressants, pain medicine, iron supplements, antacids, and water pills.    · Certain diseases, such as diabetes, irritable bowel syndrome (IBS), thyroid disease, or depression.    · Not drinking enough water.    · Not eating enough fiber-rich foods.    · Stress or travel.    · Lack of physical activity or exercise.    · Ignoring the urge to have a bowel movement.    · Using laxatives too much.    SIGNS AND SYMPTOMS   · Having fewer than three bowel movements a week.    · Straining to have a bowel movement.    · Having stools that are hard, dry, or larger than normal.    · Feeling full or bloated.    · Pain in the lower abdomen.    · Not feeling relief after having a bowel movement.    DIAGNOSIS   Your health care provider will take a medical history and perform a physical exam. Further testing may be done for severe constipation. Some tests may include:  · A barium enema X-ray to examine your rectum, colon, and, sometimes, your small intestine.    · A sigmoidoscopy to examine your lower colon.    · A colonoscopy to examine your entire colon.  TREATMENT   Treatment will depend on the severity of your constipation and what is causing it. Some dietary treatments include drinking more fluids and eating more fiber-rich foods. Lifestyle treatments may include regular exercise. If these diet and lifestyle recommendations do not help, your health care provider may recommend taking over-the-counter laxative medicines to help you have bowel movements. Prescription medicines may be prescribed if over-the-counter medicines do not work.   HOME CARE INSTRUCTIONS   · Eat foods that have a lot of fiber, such as fruits, vegetables, whole grains, and beans.  · Limit foods high in fat and processed sugars, such as french fries, hamburgers, cookies, candies, and soda.    · A fiber  supplement may be added to your diet if you cannot get enough fiber from foods.    · Drink enough fluids to keep your urine clear or pale yellow.    · Exercise regularly or as directed by your health care provider.    · Go to the restroom when you have the urge to go. Do not hold it.    · Only take over-the-counter or prescription medicines as directed by your health care provider. Do not take other medicines for constipation without talking to your health care provider first.    SEEK IMMEDIATE MEDICAL CARE IF:   · You have bright red blood in your stool.    · Your constipation lasts for more than 4 days or gets worse.    · You have abdominal or rectal pain.    · You have thin, pencil-like stools.    · You have unexplained weight loss.  MAKE SURE YOU:   · Understand these instructions.  · Will watch your condition.  · Will get help right away if you are not doing well or get worse.     This information is not intended to replace advice given to you by your health care provider. Make sure you discuss any questions you have with your health care provider.     Document Released: 09/15/2005 Document Revised: 01/08/2016 Document Reviewed: 09/29/2014  [employer] Interactive Patient Education ©2016 [employer]            Patient Information     Patient Information    Following emergency treatment: all patient requiring follow-up care must return either to a private physician or a clinic if your condition worsens before you are able to obtain further medical attention, please return to the emergency room.     Billing Information    At [facility], we work to make the billing process streamlined for our patients.  Our Representatives are here to answer any questions you may have regarding your hospital bill.  If you have insurance coverage and have supplied your insurance information to us, we will submit a claim to your insurer on your behalf.  Should you have any questions regarding your bill, we can be reached online or  by phone as follows:  Online: You are able pay your bills online or live chat with our representatives about any billing questions you may have. We are here to help Monday - Friday from 8:00am to 7:30pm and 9:00am - 12:00pm on Saturdays.  Please visit [email or web address]  for more information.   Phone:  [phone] or [phone]    Please note that your emergency physician, surgeon, pathologist, radiologist, anesthesiologist, and other specialists are not employed by [facility] and will therefore bill separately for their services.  Please contact them directly for any questions concerning their bills at the numbers below:     Emergency Physician Services:  [phone]  [facility]:  [phone]  [facility]:  [phone]  [facility]:  [phone]    1. Your final bill may vary from the amount quoted upon discharge if all procedures are not complete at that time, or if your doctor has additional procedures of which we are not aware. You will receive an additional bill if you return to the Emergency Department at [facility] for suture removal regardless of the facility of which the sutures were placed.     2. Please arrange for settlement of this account at the emergency registration.    3. All self-pay accounts are due in full at the time of treatment.  If you are unable to meet this obligation then payment is expected within 4-5 days.     4. If you have had radiology studies (CT, X-ray, Ultrasound, MRI), you have received a preliminary result during your emergency department visit. Please contact the radiology department [phone] to receive a copy of your final result. Please discuss the Final result with your primary physician or with the follow up physician provided.     Crisis Hotline:  [place] Crisis Hotline:  7-596-AZOWQBP or 1-613.762.1083  [place] Crisis Hotline:    1-684.201.3059 or 229-474-2873         ED  Discharge Follow Up Questions    1. In order to provide you with very good care, we would like to follow up with a phone call in the next few days.  May we have your permission to contact you?     YES /  NO    2. What is the best phone number to call you? (       )_____-__________    3. What is the best time to call you?      Morning  /  Afternoon  /  Evening                   Patient Signature:  ____________________________________________________________    Date:  ____________________________________________________________      Your appointments     Mar 23, 2017 11:00 AM   Telephone Appointment with [facility]   [facility] [place]    [address]  [place] [address]   [phone]            Apr 03, 2017 10:30 AM   FOLLOW UP with Maxwell Phan M.D.   [facility] (--)    [address]  [place] [address]   [phone]            Apr 05, 2017  8:00 AM   Adult Draw/Collection with [facility]   [facility] (--)    [address]  [place] [address]   [phone]            Apr 12, 2017 10:00 AM   Established Patient with Bernarda Reyes D.O.   [facility] [place]    [address], [address]  [place] [address] [phone]           You will be receiving a confirmation call a few days before your appointment from our automated call confirmation system.            May 10, 2017  8:00 AM   Adult Draw/Collection with [facility]   [facility] (--)    [address]  [place] [address]   [phone]            Jun 07, 2017  8:00 AM   Adult Draw/Collection with [facility]   [facility] (--)    [address] [address]   [phone]

## 2017-03-19 NOTE — ED AVS SNAPSHOT
[employer] Access Code: Activation code not generated  Current [employer] Status: Active    [employer]  A secure, online tool to manage your health information     [employer]® is a secure, online tool that connects you to your personalized health information from the privacy of your home -- day or night - making it very easy for you to manage your healthcare. Once the activation process is completed, you can even access your medical information using the [employer] kacy, which is available for free in the Apple Kacy store or Google Play store.     [employer] provides the following levels of access (as shown below):   My Chart Features   [facility] Primary Care Doctor [facility]  Specialists [facility]  Urgent  Care Non-[facility]  Primary Care  Doctor   Email your healthcare team securely and privately 24/7 X X X X   Manage appointments: schedule your next appointment; view details of past/upcoming appointments X      Request prescription refills. X      View recent personal medical records, including lab and immunizations X X X X   View health record, including health history, allergies, medications X X X X   Read reports about your outpatient visits, procedures, consult and ER notes X X X X   See your discharge summary, which is a recap of your hospital and/or ER visit that includes your diagnosis, lab results, and care plan. X X       How to register for [employer]:  1. Go to  [email or web address].  2. Click on the Sign Up Now box, which takes you to the New Member Sign Up page. You will need to provide the following information:  a. Enter your [employer] Access Code exactly as it appears at the top of this page. (You will not need to use this code after you’ve completed the sign-up process. If you do not sign up before the expiration date, you must request a new code.)   b. Enter your date of birth.   c. Enter your home email address.   d. Click Submit, and follow the next screen’s instructions.  3. Create a [employer] ID. This will  be your [employer]Me login ID and cannot be changed, so think of one that is secure and easy to remember.  4. Create a [employer]Me password. You can change your password at any time.  5. Enter your Password Reset Question and Answer. This can be used at a later time if you forget your password.   6. Enter your e-mail address. This allows you to receive e-mail notifications when new information is available in BriefMe.  7. Click Sign Up. You can now view your health information.    For assistance activating your [employer] account, call [phone]

## 2017-03-19 NOTE — ED AVS SNAPSHOT
3/19/2017          Roxana Cuba  [address]  [place] [address]    Dear Roxana:    [facility] wants to ensure your discharge home is safe and you or your loved ones have had all your questions answered regarding your care after you leave the hospital.    You may receive a telephone call within two days of your discharge.  This call is to make certain you understand your discharge instructions as well as ensure we provided you with the best care possible during your stay with us.     The call will only last approximately 3-5 minutes and will be done by a nurse.    Once again, we want to ensure your discharge home is safe and that you have a clear understanding of any next steps in your care.  If you have any questions or concerns, please do not hesitate to contact us, we are here for you.  Thank you for choosing [facility] for your healthcare needs.    Sincerely,    Fredrick Figueroa    [facility]

## 2017-03-20 ENCOUNTER — APPOINTMENT (OUTPATIENT)
Dept: RADIOLOGY | Facility: MEDICAL CENTER | Age: 57
DRG: 392 | End: 2017-03-20
Attending: EMERGENCY MEDICINE
Payer: MEDICARE

## 2017-03-20 ENCOUNTER — HOSPITAL ENCOUNTER (EMERGENCY)
Facility: MEDICAL CENTER | Age: 57
DRG: 392 | End: 2017-03-20
Attending: EMERGENCY MEDICINE
Payer: MEDICARE

## 2017-03-20 VITALS
OXYGEN SATURATION: 95 % | TEMPERATURE: 98.2 F | BODY MASS INDEX: 27.47 KG/M2 | WEIGHT: 175 LBS | SYSTOLIC BLOOD PRESSURE: 98 MMHG | HEIGHT: 67 IN | HEART RATE: 60 BPM | DIASTOLIC BLOOD PRESSURE: 53 MMHG | RESPIRATION RATE: 11 BRPM

## 2017-03-20 DIAGNOSIS — R10.9 ABDOMINAL PAIN OF UNKNOWN ETIOLOGY: ICD-10-CM

## 2017-03-20 LAB
ALBUMIN SERPL BCP-MCNC: 3.4 G/DL (ref 3.2–4.9)
ALBUMIN/GLOB SERPL: 2 G/DL
ALP SERPL-CCNC: 25 U/L (ref 30–99)
ALT SERPL-CCNC: 13 U/L (ref 2–50)
ANION GAP SERPL CALC-SCNC: 5 MMOL/L (ref 0–11.9)
APTT PPP: 27.2 SEC (ref 24.7–36)
AST SERPL-CCNC: 14 U/L (ref 12–45)
BASOPHILS # BLD AUTO: 0.2 % (ref 0–1.8)
BASOPHILS # BLD: 0.01 K/UL (ref 0–0.12)
BILIRUB SERPL-MCNC: 0.3 MG/DL (ref 0.1–1.5)
BUN SERPL-MCNC: 11 MG/DL (ref 8–22)
CALCIUM SERPL-MCNC: 8.1 MG/DL (ref 8.5–10.5)
CHLORIDE SERPL-SCNC: 104 MMOL/L (ref 96–112)
CO2 SERPL-SCNC: 30 MMOL/L (ref 20–33)
CREAT SERPL-MCNC: 1.02 MG/DL (ref 0.5–1.4)
EOSINOPHIL # BLD AUTO: 0.11 K/UL (ref 0–0.51)
EOSINOPHIL NFR BLD: 2.1 % (ref 0–6.9)
ERYTHROCYTE [DISTWIDTH] IN BLOOD BY AUTOMATED COUNT: 47.6 FL (ref 35.9–50)
GFR SERPL CREATININE-BSD FRML MDRD: 56 ML/MIN/1.73 M 2
GLOBULIN SER CALC-MCNC: 1.7 G/DL (ref 1.9–3.5)
GLUCOSE SERPL-MCNC: 108 MG/DL (ref 65–99)
HCT VFR BLD AUTO: 38.7 % (ref 37–47)
HGB BLD-MCNC: 12.5 G/DL (ref 12–16)
IMM GRANULOCYTES # BLD AUTO: 0.02 K/UL (ref 0–0.11)
IMM GRANULOCYTES NFR BLD AUTO: 0.4 % (ref 0–0.9)
INR PPP: 0.9 (ref 0.87–1.13)
LIPASE SERPL-CCNC: 10 U/L (ref 11–82)
LYMPHOCYTES # BLD AUTO: 0.78 K/UL (ref 1–4.8)
LYMPHOCYTES NFR BLD: 14.7 % (ref 22–41)
MCH RBC QN AUTO: 28.6 PG (ref 27–33)
MCHC RBC AUTO-ENTMCNC: 32.3 G/DL (ref 33.6–35)
MCV RBC AUTO: 88.6 FL (ref 81.4–97.8)
MONOCYTES # BLD AUTO: 0.24 K/UL (ref 0–0.85)
MONOCYTES NFR BLD AUTO: 4.5 % (ref 0–13.4)
NEUTROPHILS # BLD AUTO: 4.15 K/UL (ref 2–7.15)
NEUTROPHILS NFR BLD: 78.1 % (ref 44–72)
NRBC # BLD AUTO: 0 K/UL
NRBC BLD AUTO-RTO: 0 /100 WBC
PLATELET # BLD AUTO: 84 K/UL (ref 164–446)
PMV BLD AUTO: 9.6 FL (ref 9–12.9)
POTASSIUM SERPL-SCNC: 4.1 MMOL/L (ref 3.6–5.5)
PROT SERPL-MCNC: 5.1 G/DL (ref 6–8.2)
PROTHROMBIN TIME: 12.4 SEC (ref 12–14.6)
RBC # BLD AUTO: 4.37 M/UL (ref 4.2–5.4)
SODIUM SERPL-SCNC: 139 MMOL/L (ref 135–145)
WBC # BLD AUTO: 5.3 K/UL (ref 4.8–10.8)

## 2017-03-20 RX ORDER — MORPHINE SULFATE 4 MG/ML
4 INJECTION, SOLUTION INTRAMUSCULAR; INTRAVENOUS ONCE
Status: COMPLETED | OUTPATIENT
Start: 2017-03-20 | End: 2017-03-20

## 2017-03-20 RX ORDER — SODIUM CHLORIDE 9 MG/ML
1000 INJECTION, SOLUTION INTRAVENOUS ONCE
Status: COMPLETED | OUTPATIENT
Start: 2017-03-20 | End: 2017-03-20

## 2017-03-20 RX ORDER — HALOPERIDOL 5 MG/ML
3 INJECTION INTRAMUSCULAR ONCE
Status: COMPLETED | OUTPATIENT
Start: 2017-03-20 | End: 2017-03-20

## 2017-03-20 RX ADMIN — IOHEXOL 50 ML: 240 INJECTION, SOLUTION INTRATHECAL; INTRAVASCULAR; INTRAVENOUS; ORAL at 15:45

## 2017-03-20 RX ADMIN — SODIUM CHLORIDE 1000 ML: 9 INJECTION, SOLUTION INTRAVENOUS at 14:30

## 2017-03-20 RX ADMIN — IOHEXOL 100 ML: 350 INJECTION, SOLUTION INTRAVENOUS at 15:43

## 2017-03-20 RX ADMIN — MORPHINE SULFATE 4 MG: 4 INJECTION INTRAVENOUS at 14:30

## 2017-03-20 RX ADMIN — HALOPERIDOL LACTATE 3 MG: 5 INJECTION, SOLUTION INTRAMUSCULAR at 14:30

## 2017-03-20 ASSESSMENT — LIFESTYLE VARIABLES: DO YOU DRINK ALCOHOL: NO

## 2017-03-20 NOTE — ED NOTES
1000ml soap suds enema administered; tolerated well.  Lying L side w/ call light in reach, BSC at BS.

## 2017-03-20 NOTE — ED NOTES
Pt seen at [facility] yesterday for abdominal pain and distention, pt states they did not do ct just xray and d/c with go-Lightly, pt believes she has bowel obstruction and states abnormal bowel habits

## 2017-03-20 NOTE — DISCHARGE INSTRUCTIONS
Take golytely tomorrow.  Constipation, Adult  Constipation is when a person has fewer than three bowel movements a week, has difficulty having a bowel movement, or has stools that are dry, hard, or larger than normal. As people grow older, constipation is more common. A low-fiber diet, not taking in enough fluids, and taking certain medicines may make constipation worse.   CAUSES   · Certain medicines, such as antidepressants, pain medicine, iron supplements, antacids, and water pills.    · Certain diseases, such as diabetes, irritable bowel syndrome (IBS), thyroid disease, or depression.    · Not drinking enough water.    · Not eating enough fiber-rich foods.    · Stress or travel.    · Lack of physical activity or exercise.    · Ignoring the urge to have a bowel movement.    · Using laxatives too much.    SIGNS AND SYMPTOMS   · Having fewer than three bowel movements a week.    · Straining to have a bowel movement.    · Having stools that are hard, dry, or larger than normal.    · Feeling full or bloated.    · Pain in the lower abdomen.    · Not feeling relief after having a bowel movement.    DIAGNOSIS   Your health care provider will take a medical history and perform a physical exam. Further testing may be done for severe constipation. Some tests may include:  · A barium enema X-ray to examine your rectum, colon, and, sometimes, your small intestine.    · A sigmoidoscopy to examine your lower colon.    · A colonoscopy to examine your entire colon.  TREATMENT   Treatment will depend on the severity of your constipation and what is causing it. Some dietary treatments include drinking more fluids and eating more fiber-rich foods. Lifestyle treatments may include regular exercise. If these diet and lifestyle recommendations do not help, your health care provider may recommend taking over-the-counter laxative medicines to help you have bowel movements. Prescription medicines may be prescribed if over-the-counter  medicines do not work.   HOME CARE INSTRUCTIONS   · Eat foods that have a lot of fiber, such as fruits, vegetables, whole grains, and beans.  · Limit foods high in fat and processed sugars, such as french fries, hamburgers, cookies, candies, and soda.    · A fiber supplement may be added to your diet if you cannot get enough fiber from foods.    · Drink enough fluids to keep your urine clear or pale yellow.    · Exercise regularly or as directed by your health care provider.    · Go to the restroom when you have the urge to go. Do not hold it.    · Only take over-the-counter or prescription medicines as directed by your health care provider. Do not take other medicines for constipation without talking to your health care provider first.    SEEK IMMEDIATE MEDICAL CARE IF:   · You have bright red blood in your stool.    · Your constipation lasts for more than 4 days or gets worse.    · You have abdominal or rectal pain.    · You have thin, pencil-like stools.    · You have unexplained weight loss.  MAKE SURE YOU:   · Understand these instructions.  · Will watch your condition.  · Will get help right away if you are not doing well or get worse.     This information is not intended to replace advice given to you by your health care provider. Make sure you discuss any questions you have with your health care provider.     Document Released: 09/15/2005 Document Revised: 01/08/2016 Document Reviewed: 09/29/2014  [employer] Interactive Patient Education ©2016 [employer]

## 2017-03-20 NOTE — ED NOTES
Discharge instructions provided.  Pt verbalized the understanding of discharge instructions to follow up with PCP and to return to ER if condition worsens.  Pt ambulated out of ER without difficulty with .

## 2017-03-20 NOTE — ED NOTES
Blood drawn and to lab.  Medicated as ordered.  IVF infusing.  Awaits imaging w/  at BS and call light in reach.

## 2017-03-20 NOTE — ED AVS SNAPSHOT
3/20/2017          Roxana Cuba  [address]  [place] [address]    Dear Roxana:    [facility] wants to ensure your discharge home is safe and you or your loved ones have had all your questions answered regarding your care after you leave the hospital.    You may receive a telephone call within two days of your discharge.  This call is to make certain you understand your discharge instructions as well as ensure we provided you with the best care possible during your stay with us.     The call will only last approximately 3-5 minutes and will be done by a nurse.    Once again, we want to ensure your discharge home is safe and that you have a clear understanding of any next steps in your care.  If you have any questions or concerns, please do not hesitate to contact us, we are here for you.  Thank you for choosing [facility] for your healthcare needs.    Sincerely,    Fredrick Figueroa    [facility]

## 2017-03-20 NOTE — ED AVS SNAPSHOT
Home Care Instructions                                                                                                                Roxana Cuba   MRN: [id number]    Department:  [facility], Emergency Dept   Date of Visit:  3/20/2017            [facility], Emergency Dept    [address]    [place] [address]    Phone:  [phone]      You were seen by     Sudarshan Bowie M.D.      Your Diagnosis Was     Abdominal pain of unknown etiology     R10.9       These are the medications you received during your hospitalization from 03/20/2017 1310 to 03/20/2017 1855     Date/Time Order Dose Route Action    03/20/2017 1430 NS infusion 1,000 mL 1,000 mL Intravenous New Bag    03/20/2017 1430 morphine (pf) 4 mg/ml injection 4 mg 4 mg Intravenous Given    03/20/2017 1430 haloperidol lactate (HALDOL) injection 3 mg 3 mg Intravenous Given    03/20/2017 1543 iohexol (OMNIPAQUE) 350 mg/mL 100 mL Intravenous Given    03/20/2017 1545 iohexol (OMNIPAQUE) 240 mg/mL 50 mL Oral Given      Medication Information     Review all of your home medications and newly ordered medications with your primary doctor and/or pharmacist as soon as possible. Follow medication instructions as directed by your doctor and/or pharmacist.     Please keep your complete medication list with you and share with your physician. Update the information when medications are discontinued, doses are changed, or new medications (including over-the-counter products) are added; and carry medication information at all times in the event of emergency situations.               Medication List      ASK your doctor about these medications        Instructions    Morning Afternoon Evening Bedtime    * ADCIRCA 20 MG Tabs   Generic drug:  Tadalafil (PAH)        Take 40 mg by mouth every bedtime. Indications: Pulmonary Arterial Hypertension   Dose:  40 mg                        * ADCIRCA 20 MG Tabs   Generic drug:  Tadalafil (PAH)        Take 1 Tab by mouth every bedtime.   Dose:  1 Tab                        alprazolam 0.5 MG Tabs   Commonly known as:  XANAX        Doctor's comments:  Refill after 2/11/17   Take 1 Tab by mouth 3 times a day.   Dose:  0.5 mg                        ambrisentan 10 MG tablet   Commonly known as:  LETAIRIS        Take 1 Tab by mouth every day.   Dose:  10 mg                        aspirin EC 81 MG Tbec   Commonly known as:  ECOTRIN        Take 1 Tab by mouth every morning.   Dose:  81 mg                        BENEFIBER Powd        Take  by mouth.                        CELLCEPT 250 MG Caps   Generic drug:  mycophenolate        Take 500 mg by mouth 2 times a day.   Dose:  500 mg                        CITRACAL MAXIMUM 315-250 MG-UNIT Tabs   Generic drug:  Calcium Citrate-Vitamin D        TAKE 2 TABS BY MOUTH 2X/ DAY WITH FOOD BEFORE AND AFTER DINNER FOR LIFE-CRUSH 1ST 3 MONTH, SPACE MVI                        cyclobenzaprine 10 MG Tabs   Commonly known as:  FLEXERIL        Take 1 Tab by mouth 2 times a day as needed.   Dose:  10 mg                        docusate sodium 100 MG Caps   Commonly known as:  COLACE        Take 100 mg by mouth 2 times a day.   Dose:  100 mg                        ferrous sulfate 325 (65 FE) MG EC tablet        Take 1 Tab by mouth 2 times a day.   Dose:  325 mg                        fluticasone 50 MCG/ACT nasal spray   Commonly known as:  FLONASE        Spray 1 Spray in nose every day.   Dose:  1 Spray                        furosemide 40 MG Tabs   Commonly known as:  LASIX        Take 40 mg by mouth every morning.   Dose:  40 mg                        gabapentin 600 MG tablet   Commonly known as:  NEURONTIN        Doctor's comments:  Authorization of a refill request.   Take 1 Tab by mouth 3 times a day.   Dose:  600 mg                        GAVILYTE-C 240 G solution   Generic drug:  polyethylene glycol                             * HUMALOG SC        Inject  as instructed as needed.                         * HUMALOG 100 UNIT/ML Soln   Generic drug:  insulin lispro        Sensitive Regimen - follow the scale written on the discharge instructions                        HYDROmorphone 8 MG tablet   Commonly known as:  DILAUDID        Take 8 mg by mouth.   Dose:  8 mg                        * insulin glargine 100 UNIT/ML Soln   Commonly known as:  LANTUS        Inject  as instructed as needed (adjust as needed).                        * LANTUS SOLOSTAR 100 UNIT/ML Sopn injection   Generic drug:  insulin glargine        8 Units by Subdermal route.   Dose:  8 Units                        Lactobacillus Acidophilus Powd        Take  by mouth.                        levothyroxine 137 MCG Tabs   Commonly known as:  SYNTHROID        Take 1 Tab by mouth every day.   Dose:  137 mcg                        nystatin Powd   Commonly known as:  MYCOSTATIN                             omeprazole 40 MG delayed-release capsule   Commonly known as:  PRILOSEC        Take 1 Cap by mouth every day.   Dose:  40 mg                        ondansetron 4 MG Tbdp   Commonly known as:  ZOFRAN ODT        Take 1 Tab by mouth every 8 hours as needed for Nausea/Vomiting. Nausea and vomiting   Dose:  4 mg                        oxycodone immediate release 10 MG immediate release tablet   Commonly known as:  ROXICODONE        Doctor's comments:  Refill after 3/15/17   Take 1-3 Tabs by mouth every 6 hours as needed for Moderate Pain.   Dose:  10-30 mg                        pravastatin 20 MG Tabs   Commonly known as:  PRAVACHOL        Take 1 Tab by mouth every day.   Dose:  20 mg                        predniSONE 5 MG Tabs   Commonly known as:  DELTASONE        Take 7 mg by mouth every morning.   Dose:  7 mg                        * sennosides 8.6 MG Tabs   Commonly known as:  SENOKOT        Take 17.2 mg by mouth 2 times a day.   Dose:  17.2 mg                        * sennosides 8.6 MG Tabs   Commonly known as:  SENOKOT        Take  by  mouth.                        sertraline 100 MG Tabs   Commonly known as:  ZOLOFT        Take 100 mg by mouth every bedtime.   Dose:  100 mg                        * tacrolimus 1 MG Caps   Commonly known as:  PROGRAF        Take 1.5-2 mg by mouth 2 times a day. 2 mg in the AM and 1.5 mg at 2000   Dose:  1.5-2 mg                        * tacrolimus 0.5 MG Caps   Commonly known as:  PROGRAF                             * tacrolimus 0.5 MG Caps   Commonly known as:  PROGRAF        Take 1.5 mg by mouth.   Dose:  1.5 mg                        tiotropium 18 MCG Caps   Commonly known as:  SPIRIVA        Inhale 1 Cap by mouth every day.   Dose:  18 mcg                        trazodone 50 MG Tabs   Commonly known as:  DESYREL        Take 1-2 Tabs by mouth at bedtime as needed for Sleep.   Dose:   mg                        * Notice:  This list has 11 medication(s) that are the same as other medications prescribed for you. Read the directions carefully, and ask your doctor or other care provider to review them with you.            Procedures and tests performed during your visit     APTT    CBC WITH DIFFERENTIAL    COMP METABOLIC PANEL    CONSENT FOR CONTRAST INJECTION    CT-ABDOMEN-PELVIS WITH    Cardiac Monitoring    DX-CHEST-PORTABLE (1 VIEW)    EKG (NOW)    ESTIMATED GFR    IV Saline Lock    LIPASE    Obtain Old EKG    PROTHROMBIN TIME (INR)        Discharge Instructions       See your gastroenterologist on Monday as planned for further evaluation. Drink lots of fluids to maintain hydration. You may use MiraLAX if needed for constipation. Return here if you have new or worsening symptoms.          Patient Information     Patient Information    Following emergency treatment: all patient requiring follow-up care must return either to a private physician or a clinic if your condition worsens before you are able to obtain further medical attention, please return to the emergency room.     Billing Information    At [facility], we work to make the billing process streamlined for our patients.  Our Representatives are here to answer any questions you may have regarding your hospital bill.  If you have insurance coverage and have supplied your insurance information to us, we will submit a claim to your insurer on your behalf.  Should you have any questions regarding your bill, we can be reached online or by phone as follows:  Online: You are able pay your bills online or live chat with our representatives about any billing questions you may have. We are here to help Monday - Friday from 8:00am to 7:30pm and 9:00am - 12:00pm on Saturdays.  Please visit [email or web address]  for more information.   Phone:  [phone] or [phone]    Please note that your emergency physician, surgeon, pathologist, radiologist, anesthesiologist, and other specialists are not employed by [facility] and will therefore bill separately for their services.  Please contact them directly for any questions concerning their bills at the numbers below:     Emergency Physician Services:  [phone]  [facility]:  [phone]  [facility]:  [phone]  [facility]:  [phone]    1. Your final bill may vary from the amount quoted upon discharge if all procedures are not complete at that time, or if your doctor has additional procedures of which we are not aware. You will receive an additional bill if you return to the Emergency Department at [facility] for suture removal regardless of the facility of which the sutures were placed.     2. Please arrange for settlement of this account at the emergency registration.    3. All self-pay accounts are due in full at the time of treatment.  If you are unable to meet this obligation then payment is expected within 4-5 days.     4. If you have had radiology studies (CT, X-ray, Ultrasound, MRI), you have received a preliminary result  during your emergency department visit. Please contact the radiology department [phone] to receive a copy of your final result. Please discuss the Final result with your primary physician or with the follow up physician provided.     Crisis Hotline:  [place] Crisis Hotline:  5-680-KWFXOUV or 1-645.967.2239  [place] Crisis Hotline:    1-764.831.9813 or 953-595-5531         ED Discharge Follow Up Questions    1. In order to provide you with very good care, we would like to follow up with a phone call in the next few days.  May we have your permission to contact you?     YES /  NO    2. What is the best phone number to call you? (       )_____-__________    3. What is the best time to call you?      Morning  /  Afternoon  /  Evening                   Patient Signature:  ____________________________________________________________    Date:  ____________________________________________________________      Your appointments     Mar 23, 2017 11:00 AM   Telephone Appointment with [facility]   [facility] [place] [facility] (--)    [address]  [place] [id number] [phone]            Apr 03, 2017 10:30 AM   FOLLOW UP with Maxwell Phan M.D.   [facility] (--)    [address]  [place] [id number] [phone]            Apr 05, 2017  8:00 AM   Adult Draw/Collection with [facility]   [facility] (--)    [address]  [place] [address]   [phone]            Apr 12, 2017 10:00 AM   Established Patient with Bernarda Reyes D.O.   [facility]    [address] [place] [address], [address]  [place] [address] [phone]           You will be receiving a confirmation call a few days before your appointment from our automated call confirmation system.            May 10, 2017  8:00 AM   Adult Draw/Collection with LAB [facility]   [facility] (--)    [address]  [place] [address]   [phone]            Jun 07, 2017  8:00 AM      Adult Draw/Collection with LAB [facility]   [facility] (--)    [address]  [place] [address] [phone]

## 2017-03-20 NOTE — ED PROVIDER NOTES
"ED Provider Note    CHIEF COMPLAINT  Chief Complaint   Patient presents with   • Abdominal Pain   • N/V       HPI  Roxana Cuba is a 57 y.o. female who presents complains of abdominal pain and nausea and vomiting. The patient has a very extensive medical history, but lately has in the last 2 months as been experiencing abdominal pain and severe constipation. She has a colonoscopy scheduled in the near future, but today is stating that she has not had a bowel movement and feels somewhat distended. This is occurred recently and apparently she was diagnosed having a bowel obstruction couple of months ago. Repeat visits since then have not demonstrated    REVIEW OF SYSTEMS  See HPI for further details. All other systems are negative.    PAST MEDICAL HISTORY  Past Medical History   Diagnosis Date   • Cirrhosis (CMS-HCC) December 2011     [place] [facility]   • S/P cholecystectomy    • GERD (gastroesophageal reflux disease)          • Psychiatric disorder      Mood disorder   • Fracture of left foot    • Bronchitis       2016   • CKD (chronic kidney disease) stage 3, GFR 30-59 ml/min    • Breath shortness    • Chronic fatigue and malaise    • VRE (vancomycin-resistant Enterococci)      02-17-17, pt declines knowledge of this   • Low back pain 02-17-17     and left foot, 7/10   • Pneumonia      aspiration,    • Mild aortic regurgitation and aortic valve sclerosis     • Splenomegaly    • Small bowel obstruction (CMS-HCC)      01-   • On home oxygen therapy      4 liters, Dr. Gaming   • Pulmonary hypertension (CMS-HCC)         • Diabetes ([facility])      reports hx of, resolved w/weight loss. Reports still checking bloodsugars twice daily and using insulin PRN   • Hypothyroid    • H/O Clostridium difficile infection 02-17-17     reports \"16 months ago\". Current stool sample 01-26-17 neg   • Platelet disorder (CMS-HCC)      low platelets       FAMILY HISTORY  Family History   Problem " Relation Age of Onset   • Other Father      Unknown (dead 10 years)   • Diabetes Father    • Heart Disease Father    • Hypertension Father    • Hyperlipidemia Father    • Stroke Father    • Non-contributory Mother    • Hyperlipidemia Mother    • Alcohol/Drug Mother    • Cancer Paternal Aunt    • Alcohol/Drug Maternal Grandmother    • Alcohol/Drug Maternal Grandfather        SOCIAL HISTORY  Social History     Social History   • Marital Status:      Spouse Name: N/A   • Number of Children: N/A   • Years of Education: N/A     Social History Main Topics   • Smoking status: Passive Smoke Exposure - Never Smoker   • Smokeless tobacco: Never Used      Comment: avoid all tobacco products   • Alcohol Use: No      Comment: 05/2009 quit drinking   • Drug Use: No   • Sexual Activity: Not Asked     Other Topics Concern   • None     Social History Narrative       SURGICAL HISTORY  Past Surgical History   Procedure Laterality Date   • Pr anesth,nose,sinus surgery     • Latricia by laparoscopy  9/19/2009     Performed by BIRD ABDI at SURGERY [facility]   • Exam under anesthesia  11/11/2009     Performed by BIRD ABDI at SURGERY [facility]   • Hysterectomy, total abdominal           • Other       Breast reduction   • Gastroscopy-endo  3/12/2010     Performed by CAMELIA SAMANO at ENDOSCOPY [facility]   • Bronchoscopy-endo  5/29/2012     Performed by SUYAPA CAMARENA at ENDOSCOPY [facility]S   • Bronchoscopy-endo  6/19/2012     Performed by MARLENA MURILLO at ENDOSCOPY [facility] ORS   • Sigmoidoscopy flex  9/26/2012     Performed by Kristopher Cardoso M.D. at ENDOSCOPY [facility]   • Recovery  2/27/2013     Performed by Ir[facility]Recovery Surgery at SURGERY SAME DAY [facility]   • Bronchoscopy-endo  11/15/2013     Performed by Ha Perez M.D. at ENDOSCOPY [facility]   • Recovery  1/21/2014     Performed by Ir-Recovery Surgery at SURGERY SAME DAY [facility] ORS   • Recovery   3/24/2014     Performed by Cath-Recovery Surgery at SURGERY SAME DAY [facility]   • Hemorrhoidectomy  11/11/2009     Performed by BIRD ABDI at SURGERY [facility]   • Gastroscopy  9/28/2012     Performed by Aravind Richards M.D. at SURGERY [facility]   • Carpal tunnel release           • Bone marrow aspiration  12/31/2012     Performed by Josemanuel Real M.D. at ENDOSCOPY [facility]   • Bone marrow biopsy, ndl/trocar  12/31/2012     Performed by Josemanuel Real M.D. at ENDOSCOPY [facility]   • Recovery  3/31/2014     Performed by Ir-Recovery Surgery at SURGERY [facility]   • Other abdominal surgery  December 2011     Liver transplant at [facility] by Dr. Canada.   • Bone marrow aspiration  3/16/2015     Performed by Freddie Hi M.D. at ENDOSCOPY [facility]   • Bone marrow biopsy, ndl/trocar  3/16/2015     Performed by Freddie Hi M.D. at ENDOSCOPY [facility]   • Lung biopsy open  11/2016   • Tonsillectomy     • Other abdominal surgery       Gasric Sleeve   • Breast biopsy Left 2/21/2017     Procedure: BREAST BIOPSY - WIRE LOCALIZED EXCISONAL ;  Surgeon: Jane Zhao M.D.;  Location: SURGERY SAME DAY [facility];  Service:        CURRENT MEDICATIONS  Home Medications     Reviewed by Rafaela Lucia R.N. (Registered Nurse) on 03/19/17 at 1757  Med List Status: Complete    Medication Last Dose Status    alprazolam (XANAX) 0.5 MG Tab 3/19/2017 Active    ambrisentan (LETAIRIS) 10 MG tablet 3/19/2017 Active    aspirin EC (ECOTRIN) 81 MG Tablet Delayed Response 3/19/2017 Active    CITRACAL MAXIMUM 315-250 MG-UNIT Tab 3/19/2017 Active    cyclobenzaprine (FLEXERIL) 10 MG Tab 3/15/2017 Active    docusate sodium (COLACE) 100 MG Cap 3/19/2017 Active    ferrous sulfate 325 (65 FE) MG EC tablet 3/19/2017 Active    fluticasone (FLONASE) 50 MCG/ACT nasal spray 3/18/2017 Active    furosemide (LASIX) 40 MG Tab 3/19/2017 Active    gabapentin (NEURONTIN) 600 MG tablet 3/19/2017  "Active    GAVILYTE-C 240 G solution PREP FOR COLONOSCOPY 3/27 Active    HYDROmorphone (DILAUDID) 8 MG tablet 3/15/2017 Active    insulin glargine (LANTUS SOLOSTAR) 100 UNIT/ML Solution Pen-injector injection 3/15/2017 Active    insulin glargine (LANTUS) 100 UNIT/ML Solution 2/20/2017 Active    Insulin Lispro (HUMALOG SC) 2/2/2017 Active    insulin lispro (HUMALOG) 100 UNIT/ML Solution >month Active    Lactobacillus Acidophilus Powder 3/19/2017 Active    levothyroxine (SYNTHROID) 137 MCG Tab 3/19/2017 Active    mycophenolate (CELLCEPT) 250 MG Cap 3/19/2017 Active    nystatin (MYCOSTATIN) Powder 3/18/2017 Active    omeprazole (PRILOSEC) 40 MG delayed-release capsule 3/19/2017 Active    ondansetron (ZOFRAN ODT) 4 MG TABLET DISPERSIBLE 3/18/2017 Active    oxycodone immediate release (ROXICODONE) 10 MG immediate release tablet 3/19/2017 Active    pravastatin (PRAVACHOL) 20 MG Tab 3/19/2017 Active    predniSONE (DELTASONE) 5 MG Tab 3/19/2017 Active    sennosides (SENOKOT) 8.6 MG Tab 3/19/2017 Active    sennosides (SENOKOT) 8.6 MG Tab  Active    sertraline (ZOLOFT) 100 MG Tab 3/18/2017 Active    tacrolimus (PROGRAF) 0.5 MG Cap  Active    tacrolimus (PROGRAF) 0.5 MG Cap  Active    tacrolimus (PROGRAF) 1 MG Cap 3/19/2017 Active    Tadalafil, PAH, (ADCIRCA) 20 MG Tab 2/20/2017 Active    Tadalafil, PAH, (ADCIRCA) 20 MG Tab 3/18/2017 Active    tiotropium (SPIRIVA) 18 MCG Cap 3/19/2017 Active    trazodone (DESYREL) 50 MG Tab 3/18/2017 Active    Wheat Dextrin (BENEFIBER) Powder 3/19/2017 Active                ALLERGIES  Allergies   Allergen Reactions   • Rhubarb Anaphylaxis   • Vancomycin Hcl      Causes red man syndrome when infused to fast         PHYSICAL EXAM  VITAL SIGNS: /60 mmHg  Pulse 69  Temp(Src) 36.7 °C (98 °F)  Resp 18  Ht 1.702 m (5' 7\")  Wt 79 kg (174 lb 2.6 oz)  BMI 27.27 kg/m2  SpO2 97%  LMP 01/03/2000    Constitutional: Well developed, Well nourished, No acute distress, Non-toxic appearance. "   Psychiatric:  Normal psychiatric exam, Normal affect, Normal judgement, Normal mood, No suicidal or homocidal ideation.able to give a good history.     Neck: Normal range of motion, No tenderness, Supple, No stridor.   Lymphatic: No cervical or axillary lymphadenopathy noted.   Cardiovascular: Normal heart rate, Normal rhythm, No murmurs,  , No gallops.   Thorax & Lungs: Normal breath sounds, No respiratory distress, No wheezing, or other abnormal breath sounds. No chest tenderness.   Abdomen: Bowel sounds normal, does seem somewhat distended, minimal diffuse tenderness, No masses, No pulsatile masses. No guarding.   Rectal: There is no stool felt the small osteotome was Hemoccult negative  Skin: Warm, Dry, No erythema, No rash.   Back: No tenderness, No CVA tenderness.   Extremities: Intact distal pulses, No edema, No tenderness, No cyanosis, No clubbing.   Musculoskeletal: Good range of motion in all major joints. No tenderness to palpation or major deformities, or injuries noted.   Neurologic: Alert & oriented x 3, Normal motor function, Normal sensory function, No focal deficits noted.        RADIOLOGY/PROCEDURES  DX-ABDOMEN COMPLETE WITH AP OR PA CXR   Final Result      1.  No significant change in scattered air-fluid levels from the prior exam. Findings are more suggestive of enteritis or ileus than obstruction.      DX-ABDOMEN COMPLETE WITH AP OR PA CXR   Final Result      No evidence of obstruction or perforation.      Air-fluid levels could indicate diarrhea or perhaps mild ileus.          COURSE & MEDICAL DECISION MAKING  Pertinent Labs & Imaging studies reviewed. (See chart for details)  Patient's x-rays did not show an obstructive pattern she does have stool in the rectum. We tried to soapsuds enemas there is a little better results initially and eventually there was some results from the 2nd and well I repeated her abdominal x-rays per patient's request is really no change although she did have a  bowel movement following that examination. I do not does not appear the patient is has acute abdomen she does not appear obstructed effectively to try GoLYTELY which is been done in the past with good results I have offered her hospital admission as she is uncomfortable going home she preferred to go on with this point and seems fairly comfortable at this time. I think this is likely all symptoms secondary to constipation    FINAL IMPRESSION  1. Constipation  2.   3.       Electronically signed by: Silviano Koehler, 3/19/2017 11:20 PM

## 2017-03-20 NOTE — ED NOTES
Small liquid BM.  Remains distended/uncomfortable.  Requesting additional IVF.  ERP informed and orders received.  Report and care of pt to PENNY Armstrong.

## 2017-03-21 ENCOUNTER — HOSPITAL ENCOUNTER (INPATIENT)
Facility: MEDICAL CENTER | Age: 57
LOS: 6 days | DRG: 392 | End: 2017-03-27
Attending: EMERGENCY MEDICINE | Admitting: HOSPITALIST
Payer: MEDICARE

## 2017-03-21 ENCOUNTER — RESOLUTE PROFESSIONAL BILLING HOSPITAL PROF FEE (OUTPATIENT)
Dept: HOSPITALIST | Facility: MEDICAL CENTER | Age: 57
End: 2017-03-21
Payer: MEDICARE

## 2017-03-21 ENCOUNTER — APPOINTMENT (OUTPATIENT)
Dept: RADIOLOGY | Facility: MEDICAL CENTER | Age: 57
DRG: 392 | End: 2017-03-21
Attending: EMERGENCY MEDICINE
Payer: MEDICARE

## 2017-03-21 PROBLEM — E11.9 TYPE 2 DIABETES MELLITUS (HCC): Status: ACTIVE | Noted: 2017-03-21

## 2017-03-21 PROBLEM — Z53.9: Status: ACTIVE | Noted: 2017-03-21

## 2017-03-21 PROBLEM — A41.9 SEPSIS (HCC): Status: ACTIVE | Noted: 2017-03-21

## 2017-03-21 PROBLEM — J18.9 CAP (COMMUNITY ACQUIRED PNEUMONIA): Status: ACTIVE | Noted: 2017-03-21

## 2017-03-21 LAB
ALBUMIN SERPL BCP-MCNC: 3.7 G/DL (ref 3.2–4.9)
ALBUMIN/GLOB SERPL: 1.9 G/DL
ALP SERPL-CCNC: 28 U/L (ref 30–99)
ALT SERPL-CCNC: 13 U/L (ref 2–50)
ANION GAP SERPL CALC-SCNC: 6 MMOL/L (ref 0–11.9)
APPEARANCE UR: CLEAR
APTT PPP: 25.9 SEC (ref 24.7–36)
AST SERPL-CCNC: 16 U/L (ref 12–45)
BASOPHILS # BLD AUTO: 0.3 % (ref 0–1.8)
BASOPHILS # BLD: 0.02 K/UL (ref 0–0.12)
BILIRUB SERPL-MCNC: 0.4 MG/DL (ref 0.1–1.5)
BILIRUB UR QL STRIP.AUTO: NEGATIVE
BUN SERPL-MCNC: 9 MG/DL (ref 8–22)
BURR CELLS/RBC NFR CSF MANUAL: 0 %
CALCIUM SERPL-MCNC: 8.9 MG/DL (ref 8.5–10.5)
CHLORIDE SERPL-SCNC: 105 MMOL/L (ref 96–112)
CLARITY CSF: CLEAR
CO2 SERPL-SCNC: 30 MMOL/L (ref 20–33)
COLOR CSF: COLORLESS
COLOR SPUN CSF: COLORLESS
COLOR UR: YELLOW
CREAT SERPL-MCNC: 0.96 MG/DL (ref 0.5–1.4)
CULTURE IF INDICATED INDCX: YES UA CULTURE
EOSINOPHIL # BLD AUTO: 0.04 K/UL (ref 0–0.51)
EOSINOPHIL NFR BLD: 0.6 % (ref 0–6.9)
EPI CELLS #/AREA URNS HPF: ABNORMAL /HPF
ERYTHROCYTE [DISTWIDTH] IN BLOOD BY AUTOMATED COUNT: 47.8 FL (ref 35.9–50)
FLUAV H1 2009 PAND RNA SPEC QL NAA+PROBE: NOT DETECTED
FLUAV RNA SPEC QL NAA+PROBE: NEGATIVE
FLUAV+FLUBV AG SPEC QL IA: NORMAL
FLUBV RNA SPEC QL NAA+PROBE: NEGATIVE
GFR SERPL CREATININE-BSD FRML MDRD: 60 ML/MIN/1.73 M 2
GLOBULIN SER CALC-MCNC: 2 G/DL (ref 1.9–3.5)
GLUCOSE BLD-MCNC: 128 MG/DL (ref 65–99)
GLUCOSE CSF-MCNC: 75 MG/DL (ref 40–80)
GLUCOSE SERPL-MCNC: 153 MG/DL (ref 65–99)
GLUCOSE UR STRIP.AUTO-MCNC: NEGATIVE MG/DL
GRAM STN SPEC: NORMAL
HCT VFR BLD AUTO: 35.6 % (ref 37–47)
HGB BLD-MCNC: 11.5 G/DL (ref 12–16)
IMM GRANULOCYTES # BLD AUTO: 0.02 K/UL (ref 0–0.11)
IMM GRANULOCYTES NFR BLD AUTO: 0.3 % (ref 0–0.9)
INR PPP: 0.94 (ref 0.87–1.13)
KETONES UR STRIP.AUTO-MCNC: ABNORMAL MG/DL
LACTATE BLD-SCNC: 0.8 MMOL/L (ref 0.5–2)
LACTATE BLD-SCNC: 1 MMOL/L (ref 0.5–2)
LACTATE BLD-SCNC: 1.1 MMOL/L (ref 0.5–2)
LEUKOCYTE ESTERASE UR QL STRIP.AUTO: ABNORMAL
LYMPHOCYTES # BLD AUTO: 0.35 K/UL (ref 1–4.8)
LYMPHOCYTES NFR BLD: 5.4 % (ref 22–41)
LYMPHOCYTES NFR CSF: 82 %
MAGNESIUM SERPL-MCNC: 1.8 MG/DL (ref 1.5–2.5)
MCH RBC QN AUTO: 28.8 PG (ref 27–33)
MCHC RBC AUTO-ENTMCNC: 32.3 G/DL (ref 33.6–35)
MCV RBC AUTO: 89 FL (ref 81.4–97.8)
MICRO URNS: ABNORMAL
MONOCYTES # BLD AUTO: 0.34 K/UL (ref 0–0.85)
MONOCYTES NFR BLD AUTO: 5.2 % (ref 0–13.4)
MONONUC CELLS NFR CSF: 18 %
NEUTROPHILS # BLD AUTO: 5.77 K/UL (ref 2–7.15)
NEUTROPHILS NFR BLD: 88.2 % (ref 44–72)
NITRITE UR QL STRIP.AUTO: NEGATIVE
NRBC # BLD AUTO: 0.07 K/UL
NRBC BLD AUTO-RTO: 1.1 /100 WBC
PH UR STRIP.AUTO: 7 [PH]
PLATELET # BLD AUTO: 66 K/UL (ref 164–446)
PMV BLD AUTO: 9.5 FL (ref 9–12.9)
POTASSIUM SERPL-SCNC: 3.6 MMOL/L (ref 3.6–5.5)
PROT CSF-MCNC: 46 MG/DL (ref 15–45)
PROT SERPL-MCNC: 5.7 G/DL (ref 6–8.2)
PROT UR QL STRIP: NEGATIVE MG/DL
PROTHROMBIN TIME: 12.9 SEC (ref 12–14.6)
RBC # BLD AUTO: 4 M/UL (ref 4.2–5.4)
RBC # CSF: 9 CELLS/UL
RBC # URNS HPF: ABNORMAL /HPF
RBC UR QL AUTO: NEGATIVE
SIGNIFICANT IND 70042: NORMAL
SIGNIFICANT IND 70042: NORMAL
SITE SITE: NORMAL
SITE SITE: NORMAL
SODIUM SERPL-SCNC: 141 MMOL/L (ref 135–145)
SOURCE SOURCE: NORMAL
SOURCE SOURCE: NORMAL
SP GR UR STRIP.AUTO: 1.02
SPECIMEN VOL CSF: 4 ML
TRANS CELLS #/AREA URNS HPF: ABNORMAL /HPF
TUBE # CSF: 4
TUBE # CSF: 4
WBC # BLD AUTO: 6.5 K/UL (ref 4.8–10.8)
WBC # CSF: 2 CELLS/UL (ref 0–10)
WBC #/AREA URNS HPF: ABNORMAL /HPF

## 2017-03-21 PROCEDURE — 96376 TX/PRO/DX INJ SAME DRUG ADON: CPT

## 2017-03-21 PROCEDURE — 87040 BLOOD CULTURE FOR BACTERIA: CPT | Mod: 91

## 2017-03-21 PROCEDURE — 87070 CULTURE OTHR SPECIMN AEROBIC: CPT

## 2017-03-21 PROCEDURE — A9270 NON-COVERED ITEM OR SERVICE: HCPCS | Performed by: HOSPITALIST

## 2017-03-21 PROCEDURE — 700105 HCHG RX REV CODE 258: Performed by: HOSPITALIST

## 2017-03-21 PROCEDURE — 700102 HCHG RX REV CODE 250 W/ 637 OVERRIDE(OP): Performed by: HOSPITALIST

## 2017-03-21 PROCEDURE — 82945 GLUCOSE OTHER FLUID: CPT

## 2017-03-21 PROCEDURE — 87400 INFLUENZA A/B EACH AG IA: CPT

## 2017-03-21 PROCEDURE — 96367 TX/PROPH/DG ADDL SEQ IV INF: CPT

## 2017-03-21 PROCEDURE — 87502 INFLUENZA DNA AMP PROBE: CPT

## 2017-03-21 PROCEDURE — 700101 HCHG RX REV CODE 250: Performed by: EMERGENCY MEDICINE

## 2017-03-21 PROCEDURE — 81001 URINALYSIS AUTO W/SCOPE: CPT

## 2017-03-21 PROCEDURE — 700102 HCHG RX REV CODE 250 W/ 637 OVERRIDE(OP): Performed by: EMERGENCY MEDICINE

## 2017-03-21 PROCEDURE — 96365 THER/PROPH/DIAG IV INF INIT: CPT

## 2017-03-21 PROCEDURE — 84157 ASSAY OF PROTEIN OTHER: CPT

## 2017-03-21 PROCEDURE — 85730 THROMBOPLASTIN TIME PARTIAL: CPT

## 2017-03-21 PROCEDURE — 94760 N-INVAS EAR/PLS OXIMETRY 1: CPT

## 2017-03-21 PROCEDURE — 70450 CT HEAD/BRAIN W/O DYE: CPT

## 2017-03-21 PROCEDURE — 87086 URINE CULTURE/COLONY COUNT: CPT

## 2017-03-21 PROCEDURE — 83735 ASSAY OF MAGNESIUM: CPT

## 2017-03-21 PROCEDURE — 99223 1ST HOSP IP/OBS HIGH 75: CPT | Mod: AI | Performed by: HOSPITALIST

## 2017-03-21 PROCEDURE — 009U3ZX DRAINAGE OF SPINAL CANAL, PERCUTANEOUS APPROACH, DIAGNOSTIC: ICD-10-PCS | Performed by: EMERGENCY MEDICINE

## 2017-03-21 PROCEDURE — 700111 HCHG RX REV CODE 636 W/ 250 OVERRIDE (IP): Performed by: HOSPITALIST

## 2017-03-21 PROCEDURE — 62270 DX LMBR SPI PNXR: CPT

## 2017-03-21 PROCEDURE — 85025 COMPLETE CBC W/AUTO DIFF WBC: CPT

## 2017-03-21 PROCEDURE — 82962 GLUCOSE BLOOD TEST: CPT

## 2017-03-21 PROCEDURE — 85610 PROTHROMBIN TIME: CPT

## 2017-03-21 PROCEDURE — 700105 HCHG RX REV CODE 258: Performed by: EMERGENCY MEDICINE

## 2017-03-21 PROCEDURE — 770020 HCHG ROOM/CARE - TELE (206)

## 2017-03-21 PROCEDURE — 80053 COMPREHEN METABOLIC PANEL: CPT

## 2017-03-21 PROCEDURE — 96361 HYDRATE IV INFUSION ADD-ON: CPT

## 2017-03-21 PROCEDURE — A9270 NON-COVERED ITEM OR SERVICE: HCPCS | Performed by: EMERGENCY MEDICINE

## 2017-03-21 PROCEDURE — 87503 INFLUENZA DNA AMP PROB ADDL: CPT

## 2017-03-21 PROCEDURE — 36415 COLL VENOUS BLD VENIPUNCTURE: CPT

## 2017-03-21 PROCEDURE — 71010 DX-CHEST-PORTABLE (1 VIEW): CPT

## 2017-03-21 PROCEDURE — 96375 TX/PRO/DX INJ NEW DRUG ADDON: CPT

## 2017-03-21 PROCEDURE — 89051 BODY FLUID CELL COUNT: CPT

## 2017-03-21 PROCEDURE — 700101 HCHG RX REV CODE 250: Performed by: HOSPITALIST

## 2017-03-21 PROCEDURE — 87205 SMEAR GRAM STAIN: CPT

## 2017-03-21 PROCEDURE — 700111 HCHG RX REV CODE 636 W/ 250 OVERRIDE (IP): Performed by: EMERGENCY MEDICINE

## 2017-03-21 PROCEDURE — 99285 EMERGENCY DEPT VISIT HI MDM: CPT

## 2017-03-21 PROCEDURE — 83605 ASSAY OF LACTIC ACID: CPT

## 2017-03-21 RX ORDER — GABAPENTIN 300 MG/1
600 CAPSULE ORAL 3 TIMES DAILY
Status: DISCONTINUED | OUTPATIENT
Start: 2017-03-21 | End: 2017-03-27 | Stop reason: HOSPADM

## 2017-03-21 RX ORDER — ONDANSETRON 2 MG/ML
4 INJECTION INTRAMUSCULAR; INTRAVENOUS EVERY 4 HOURS PRN
Status: DISCONTINUED | OUTPATIENT
Start: 2017-03-21 | End: 2017-03-27 | Stop reason: HOSPADM

## 2017-03-21 RX ORDER — FAMOTIDINE 20 MG/1
20 TABLET, FILM COATED ORAL 2 TIMES DAILY
Status: DISCONTINUED | OUTPATIENT
Start: 2017-03-21 | End: 2017-03-21

## 2017-03-21 RX ORDER — OXYCODONE HYDROCHLORIDE 10 MG/1
10 TABLET ORAL EVERY 6 HOURS PRN
Status: DISCONTINUED | OUTPATIENT
Start: 2017-03-21 | End: 2017-03-27 | Stop reason: HOSPADM

## 2017-03-21 RX ORDER — TRAZODONE HYDROCHLORIDE 50 MG/1
100 TABLET ORAL
Status: DISCONTINUED | OUTPATIENT
Start: 2017-03-21 | End: 2017-03-27 | Stop reason: HOSPADM

## 2017-03-21 RX ORDER — ONDANSETRON 2 MG/ML
4 INJECTION INTRAMUSCULAR; INTRAVENOUS ONCE
Status: COMPLETED | OUTPATIENT
Start: 2017-03-21 | End: 2017-03-21

## 2017-03-21 RX ORDER — TADALAFIL 20 MG/1
40 TABLET ORAL
Status: DISCONTINUED | OUTPATIENT
Start: 2017-03-21 | End: 2017-03-27 | Stop reason: HOSPADM

## 2017-03-21 RX ORDER — OMEPRAZOLE 40 MG/1
40 CAPSULE, DELAYED RELEASE ORAL DAILY
Status: DISCONTINUED | OUTPATIENT
Start: 2017-03-21 | End: 2017-03-21

## 2017-03-21 RX ORDER — ONDANSETRON 4 MG/1
4 TABLET, ORALLY DISINTEGRATING ORAL EVERY 4 HOURS PRN
Status: DISCONTINUED | OUTPATIENT
Start: 2017-03-21 | End: 2017-03-27 | Stop reason: HOSPADM

## 2017-03-21 RX ORDER — FERROUS SULFATE 325(65) MG
325 TABLET ORAL 2 TIMES DAILY WITH MEALS
Status: DISCONTINUED | OUTPATIENT
Start: 2017-03-21 | End: 2017-03-27 | Stop reason: HOSPADM

## 2017-03-21 RX ORDER — DEXTROSE MONOHYDRATE 25 G/50ML
25 INJECTION, SOLUTION INTRAVENOUS
Status: DISCONTINUED | OUTPATIENT
Start: 2017-03-21 | End: 2017-03-27 | Stop reason: HOSPADM

## 2017-03-21 RX ORDER — SENNOSIDES A AND B 8.6 MG/1
17.2 TABLET, FILM COATED ORAL 2 TIMES DAILY
Status: ON HOLD | COMMUNITY
End: 2018-01-20

## 2017-03-21 RX ORDER — CEFTRIAXONE 2 G/1
2 INJECTION, POWDER, FOR SOLUTION INTRAMUSCULAR; INTRAVENOUS ONCE
Status: COMPLETED | OUTPATIENT
Start: 2017-03-21 | End: 2017-03-21

## 2017-03-21 RX ORDER — ACETAMINOPHEN 325 MG/1
650 TABLET ORAL ONCE
Status: COMPLETED | OUTPATIENT
Start: 2017-03-21 | End: 2017-03-21

## 2017-03-21 RX ORDER — LEVOTHYROXINE SODIUM 137 UG/1
137 TABLET ORAL
Status: DISCONTINUED | OUTPATIENT
Start: 2017-03-22 | End: 2017-03-27 | Stop reason: HOSPADM

## 2017-03-21 RX ORDER — SODIUM CHLORIDE 9 MG/ML
INJECTION, SOLUTION INTRAVENOUS CONTINUOUS
Status: DISCONTINUED | OUTPATIENT
Start: 2017-03-21 | End: 2017-03-23 | Stop reason: ALTCHOICE

## 2017-03-21 RX ORDER — TACROLIMUS 0.5 MG/1
0.5 CAPSULE ORAL 2 TIMES DAILY
COMMUNITY
End: 2017-10-20

## 2017-03-21 RX ORDER — TACROLIMUS 1 MG/1
1 CAPSULE ORAL
Status: DISCONTINUED | OUTPATIENT
Start: 2017-03-21 | End: 2017-03-25

## 2017-03-21 RX ORDER — DOXYCYCLINE 100 MG/1
100 TABLET ORAL EVERY 12 HOURS
Status: DISCONTINUED | OUTPATIENT
Start: 2017-03-21 | End: 2017-03-25

## 2017-03-21 RX ORDER — PROMETHAZINE HYDROCHLORIDE 25 MG/1
12.5-25 SUPPOSITORY RECTAL EVERY 4 HOURS PRN
Status: DISCONTINUED | OUTPATIENT
Start: 2017-03-21 | End: 2017-03-27 | Stop reason: HOSPADM

## 2017-03-21 RX ORDER — SODIUM CHLORIDE 9 MG/ML
500 INJECTION, SOLUTION INTRAVENOUS
Status: DISCONTINUED | OUTPATIENT
Start: 2017-03-21 | End: 2017-03-27 | Stop reason: HOSPADM

## 2017-03-21 RX ORDER — ALPRAZOLAM 0.5 MG/1
0.5 TABLET ORAL 3 TIMES DAILY
Status: DISCONTINUED | OUTPATIENT
Start: 2017-03-21 | End: 2017-03-27 | Stop reason: HOSPADM

## 2017-03-21 RX ORDER — FLUTICASONE PROPIONATE 50 MCG
1 SPRAY, SUSPENSION (ML) NASAL DAILY
Status: DISCONTINUED | OUTPATIENT
Start: 2017-03-22 | End: 2017-03-27 | Stop reason: HOSPADM

## 2017-03-21 RX ORDER — TRAZODONE HYDROCHLORIDE 50 MG/1
50 TABLET ORAL
Status: DISCONTINUED | OUTPATIENT
Start: 2017-03-21 | End: 2017-03-27 | Stop reason: HOSPADM

## 2017-03-21 RX ORDER — HEPARIN SODIUM 5000 [USP'U]/ML
5000 INJECTION, SOLUTION INTRAVENOUS; SUBCUTANEOUS EVERY 8 HOURS
Status: DISCONTINUED | OUTPATIENT
Start: 2017-03-22 | End: 2017-03-27 | Stop reason: HOSPADM

## 2017-03-21 RX ORDER — AMBRISENTAN 10 MG/1
10 TABLET, FILM COATED ORAL DAILY
Status: DISCONTINUED | OUTPATIENT
Start: 2017-03-21 | End: 2017-03-27 | Stop reason: HOSPADM

## 2017-03-21 RX ORDER — OMEPRAZOLE 20 MG/1
40 CAPSULE, DELAYED RELEASE ORAL DAILY
Status: DISCONTINUED | OUTPATIENT
Start: 2017-03-22 | End: 2017-03-27 | Stop reason: HOSPADM

## 2017-03-21 RX ORDER — MYCOPHENOLATE MOFETIL 250 MG/1
500 CAPSULE ORAL 2 TIMES DAILY
Status: DISCONTINUED | OUTPATIENT
Start: 2017-03-21 | End: 2017-03-27 | Stop reason: HOSPADM

## 2017-03-21 RX ORDER — SODIUM CHLORIDE 9 MG/ML
1000 INJECTION, SOLUTION INTRAVENOUS ONCE
Status: COMPLETED | OUTPATIENT
Start: 2017-03-21 | End: 2017-03-21

## 2017-03-21 RX ORDER — OXYCODONE HYDROCHLORIDE 30 MG/1
30 TABLET ORAL EVERY 6 HOURS PRN
Status: DISCONTINUED | OUTPATIENT
Start: 2017-03-21 | End: 2017-03-27 | Stop reason: HOSPADM

## 2017-03-21 RX ORDER — ASCORBIC ACID 500 MG
500 TABLET ORAL EVERY MORNING
Status: ON HOLD | COMMUNITY
End: 2021-08-03

## 2017-03-21 RX ORDER — SODIUM CHLORIDE 9 MG/ML
30 INJECTION, SOLUTION INTRAVENOUS
Status: COMPLETED | OUTPATIENT
Start: 2017-03-21 | End: 2017-03-21

## 2017-03-21 RX ORDER — TRAZODONE HYDROCHLORIDE 50 MG/1
50-100 TABLET ORAL
Status: DISCONTINUED | OUTPATIENT
Start: 2017-03-21 | End: 2017-03-21

## 2017-03-21 RX ORDER — POLYETHYLENE GLYCOL 3350 17 G/17G
17 POWDER, FOR SOLUTION ORAL DAILY
COMMUNITY
End: 2017-06-22

## 2017-03-21 RX ORDER — TIOTROPIUM BROMIDE 18 UG/1
1 CAPSULE ORAL; RESPIRATORY (INHALATION) DAILY
Status: DISCONTINUED | OUTPATIENT
Start: 2017-03-21 | End: 2017-03-27 | Stop reason: HOSPADM

## 2017-03-21 RX ORDER — OXYCODONE HYDROCHLORIDE 10 MG/1
20 TABLET ORAL EVERY 6 HOURS PRN
Status: DISCONTINUED | OUTPATIENT
Start: 2017-03-21 | End: 2017-03-27 | Stop reason: HOSPADM

## 2017-03-21 RX ORDER — PROMETHAZINE HYDROCHLORIDE 25 MG/1
12.5-25 TABLET ORAL EVERY 4 HOURS PRN
Status: DISCONTINUED | OUTPATIENT
Start: 2017-03-21 | End: 2017-03-27 | Stop reason: HOSPADM

## 2017-03-21 RX ORDER — CYCLOBENZAPRINE HCL 10 MG
10 TABLET ORAL 3 TIMES DAILY PRN
Status: DISCONTINUED | OUTPATIENT
Start: 2017-03-21 | End: 2017-03-27 | Stop reason: HOSPADM

## 2017-03-21 RX ORDER — TACROLIMUS 1 MG/1
2 CAPSULE ORAL EVERY MORNING
Status: DISCONTINUED | OUTPATIENT
Start: 2017-03-22 | End: 2017-03-25

## 2017-03-21 RX ORDER — TACROLIMUS 0.5 MG/1
.5-2 CAPSULE ORAL 2 TIMES DAILY
Status: DISCONTINUED | OUTPATIENT
Start: 2017-03-21 | End: 2017-03-21

## 2017-03-21 RX ORDER — PRAVASTATIN SODIUM 20 MG
20 TABLET ORAL
Status: DISCONTINUED | OUTPATIENT
Start: 2017-03-21 | End: 2017-03-27 | Stop reason: HOSPADM

## 2017-03-21 RX ORDER — OXYCODONE HYDROCHLORIDE 5 MG/1
10-30 TABLET ORAL EVERY 6 HOURS PRN
Status: DISCONTINUED | OUTPATIENT
Start: 2017-03-21 | End: 2017-03-21

## 2017-03-21 RX ADMIN — LIDOCAINE HYDROCHLORIDE 8 ML: 20 INJECTION, SOLUTION INFILTRATION; PERINEURAL at 12:15

## 2017-03-21 RX ADMIN — ACETAMINOPHEN 650 MG: 325 TABLET, FILM COATED ORAL at 12:34

## 2017-03-21 RX ADMIN — ALPRAZOLAM 0.5 MG: 0.5 TABLET ORAL at 18:57

## 2017-03-21 RX ADMIN — SODIUM CHLORIDE 125 ML: 9 INJECTION, SOLUTION INTRAVENOUS at 16:46

## 2017-03-21 RX ADMIN — GABAPENTIN 600 MG: 300 CAPSULE ORAL at 22:12

## 2017-03-21 RX ADMIN — PROCHLORPERAZINE EDISYLATE 10 MG: 5 INJECTION INTRAMUSCULAR; INTRAVENOUS at 19:03

## 2017-03-21 RX ADMIN — ALPRAZOLAM 0.5 MG: 0.5 TABLET ORAL at 22:11

## 2017-03-21 RX ADMIN — SODIUM CHLORIDE 1382 ML: 9 INJECTION, SOLUTION INTRAVENOUS at 13:40

## 2017-03-21 RX ADMIN — PRAVASTATIN SODIUM 20 MG: 20 TABLET ORAL at 22:13

## 2017-03-21 RX ADMIN — OXYCODONE HYDROCHLORIDE 10 MG: 5 TABLET ORAL at 15:07

## 2017-03-21 RX ADMIN — MYCOPHENOLATE MOFETIL 500 MG: 250 CAPSULE ORAL at 23:02

## 2017-03-21 RX ADMIN — TACROLIMUS 1 MG: 1 CAPSULE ORAL at 23:01

## 2017-03-21 RX ADMIN — CEFTRIAXONE SODIUM 2 G: 2 INJECTION, POWDER, FOR SOLUTION INTRAMUSCULAR; INTRAVENOUS at 12:34

## 2017-03-21 RX ADMIN — TRAZODONE HYDROCHLORIDE 100 MG: 50 TABLET ORAL at 22:12

## 2017-03-21 RX ADMIN — DOXYCYCLINE 100 MG: 100 TABLET ORAL at 22:11

## 2017-03-21 RX ADMIN — GABAPENTIN 600 MG: 300 CAPSULE ORAL at 18:57

## 2017-03-21 RX ADMIN — TADALAFIL 40 MG: 20 TABLET ORAL at 21:00

## 2017-03-21 RX ADMIN — ONDANSETRON 4 MG: 2 INJECTION, SOLUTION INTRAMUSCULAR; INTRAVENOUS at 16:45

## 2017-03-21 RX ADMIN — SERTRALINE 100 MG: 50 TABLET, FILM COATED ORAL at 22:12

## 2017-03-21 RX ADMIN — SODIUM CHLORIDE 1000 ML: 9 INJECTION, SOLUTION INTRAVENOUS at 10:45

## 2017-03-21 RX ADMIN — METRONIDAZOLE 500 MG: 500 INJECTION, SOLUTION INTRAVENOUS at 13:02

## 2017-03-21 RX ADMIN — METRONIDAZOLE 500 MG: 500 INJECTION, SOLUTION INTRAVENOUS at 22:13

## 2017-03-21 RX ADMIN — HYDROMORPHONE HYDROCHLORIDE 1 MG: 1 INJECTION, SOLUTION INTRAMUSCULAR; INTRAVENOUS; SUBCUTANEOUS at 12:34

## 2017-03-21 RX ADMIN — ONDANSETRON 4 MG: 2 INJECTION, SOLUTION INTRAMUSCULAR; INTRAVENOUS at 12:34

## 2017-03-21 ASSESSMENT — PAIN SCALES - GENERAL
PAINLEVEL_OUTOF10: 0
PAINLEVEL_OUTOF10: 0

## 2017-03-21 ASSESSMENT — COPD QUESTIONNAIRES
DURING THE PAST 4 WEEKS HOW MUCH DID YOU FEEL SHORT OF BREATH: NONE/LITTLE OF THE TIME
DO YOU EVER COUGH UP ANY MUCUS OR PHLEGM?: NO/ONLY WITH OCCASIONAL COLDS OR INFECTIONS
DURING THE PAST 4 WEEKS HOW MUCH DID YOU FEEL SHORT OF BREATH: NONE/LITTLE OF THE TIME
DO YOU EVER COUGH UP ANY MUCUS OR PHLEGM?: NO/ONLY WITH OCCASIONAL COLDS OR INFECTIONS

## 2017-03-21 ASSESSMENT — LIFESTYLE VARIABLES
EVER_SMOKED: NEVER
EVER_SMOKED: NEVER
ALCOHOL_USE: NO

## 2017-03-21 NOTE — IP AVS SNAPSHOT
"InvierteMe,SL" Access Code: Activation code not generated  Current "InvierteMe,SL" Status: Active    [employer]  A secure, online tool to manage your health information     [employer]’s "InvierteMe,SL"® is a secure, online tool that connects you to your personalized health information from the privacy of your home -- day or night - making it very easy for you to manage your healthcare. Once the activation process is completed, you can even access your medical information using the "InvierteMe,SL" kacy, which is available for free in the Apple Kacy store or Google Play store.     "InvierteMe,SL" provides the following levels of access (as shown below):   My Chart Features   [facility] Primary Care Doctor [facility]  Specialists [facility]  Urgent  Care Non-[facility]  Primary Care  Doctor   Email your healthcare team securely and privately 24/7 X X X X   Manage appointments: schedule your next appointment; view details of past/upcoming appointments X      Request prescription refills. X      View recent personal medical records, including lab and immunizations X X X X   View health record, including health history, allergies, medications X X X X   Read reports about your outpatient visits, procedures, consult and ER notes X X X X   See your discharge summary, which is a recap of your hospital and/or ER visit that includes your diagnosis, lab results, and care plan. X X       How to register for "InvierteMe,SL":  1. Go to  [email or web address].  2. Click on the Sign Up Now box, which takes you to the New Member Sign Up page. You will need to provide the following information:  a. Enter your "InvierteMe,SL" Access Code exactly as it appears at the top of this page. (You will not need to use this code after you’ve completed the sign-up process. If you do not sign up before the expiration date, you must request a new code.)   b. Enter your date of birth.   c. Enter your home email address.   d. Click Submit, and follow the next screen’s instructions.  3. Create a "InvierteMe,SL" ID. This will  be your REGIST[employer]AT[employer]MAPI login ID and cannot be changed, so think of one that is secure and easy to remember.  4. Create a REGIST[employer]AT-MAPI password. You can change your password at any time.  5. Enter your Password Reset Question and Answer. This can be used at a later time if you forget your password.   6. Enter your e-mail address. This allows you to receive e-mail notifications when new information is available in REGISTRAT-MAPI.  7. Click Sign Up. You can now view your health information.    For assistance activating your REGISTRAT-MAPI account, call [phone]

## 2017-03-21 NOTE — IP AVS SNAPSHOT
" <p align=\"LEFT\"><IMG SRC=\"//[email or web address]$/[email or web address]\" alt=\"Image\" WIDTH=\"50%\" HEIGHT=\"200\" BORDER=\"\"></p>                   Name:Roxana Cuba  Medical Record Number:[id number]  CSN: [id number]    YOB: 1960   Age: 57 y.o.  Sex: female  HT:1.727 m (5' 7.99\") WT: 77 kg (169 lb 12.1 oz)          Admit Date: 3/21/2017     Discharge Date:   Today's Date: 3/27/2017  Attending Doctor:  GI Buckner.*                  Allergies:  Rhubarb and Vancomycin hcl          Your appointments     Apr 03, 2017 10:30 AM   FOLLOW UP with Maxwell Phan M.D.   [facility] (--)    [address]  [place] [id number] [phone]            Apr 05, 2017  8:00 AM   Adult Draw/Collection with [facility]   [facility] (--)    [address] [address]   [phone]            Apr 12, 2017 10:00 AM   Established Patient with Bernarda Reyes D.O.   [facility])    [address], [address]  [place] [address] [phone]           You will be receiving a confirmation call a few days before your appointment from our automated call confirmation system.            May 10, 2017  8:00 AM   Adult Draw/Collection with LAB [facility]   [facility] (--)    [address] [place] [address]   [phone]            Jun 07, 2017  8:00 AM   Adult Draw/Collection with LAB [facility]   [facility] (--)    [address] [place] [address]   [phone]                 Medication List      Take these Medications        Instructions    ADCIRCA 20 MG Tabs   Generic drug:  Tadalafil (PAH)    Take 40 mg by mouth every bedtime. Indications: Pulmonary Arterial Hypertension   Dose:  40 mg       alprazolam 0.5 MG Tabs   Commonly known as:  XANAX    Doctor's comments:  Refill after 2/11/17   Take 1 Tab by mouth 3 times a day.   Dose:  0.5 mg       ambrisentan 10 MG tablet   Commonly known as:  LETAIRIS    Take 1 Tab by mouth every day.   Dose:  " 10 mg       ascorbic acid 500 MG Tabs   Commonly known as:  ascorbic acid    Take 500 mg by mouth every day.   Dose:  500 mg       aspirin EC 81 MG Tbec   Commonly known as:  ECOTRIN    Take 1 Tab by mouth every morning.   Dose:  81 mg       CELLCEPT 250 MG Caps   Generic drug:  mycophenolate    Take 500 mg by mouth 2 times a day.   Dose:  500 mg       CITRACAL MAXIMUM 315-250 MG-UNIT Tabs   Generic drug:  Calcium Citrate-Vitamin D    TAKE 2 TABS BY MOUTH 2X/ DAY WITH FOOD BEFORE AND AFTER DINNER FOR LIFE-CRUSH 1ST 3 MONTH, SPACE MVI       cyclobenzaprine 10 MG Tabs   What changed:  when to take this   Commonly known as:  FLEXERIL    Take 1 Tab by mouth 2 times a day as needed.   Dose:  10 mg       docusate sodium 100 MG Caps   Commonly known as:  COLACE    Take 100 mg by mouth 2 times a day.   Dose:  100 mg       ferrous sulfate 220 (44 FE) MG/5ML Elix   What changed:  Another medication with the same name was removed. Continue taking this medication, and follow the directions you see here.   Commonly known as:  FEOSOL    Take 5 mL by mouth every day.   Dose:  220 mg       fluticasone 50 MCG/ACT nasal spray   Commonly known as:  FLONASE    Spray 1 Spray in nose every day.   Dose:  1 Spray       furosemide 40 MG Tabs   Commonly known as:  LASIX    Take 40 mg by mouth every morning.   Dose:  40 mg       gabapentin 600 MG tablet   Commonly known as:  NEURONTIN    Doctor's comments:  Authorization of a refill request.   Take 1 Tab by mouth 3 times a day.   Dose:  600 mg       levothyroxine 137 MCG Tabs   Commonly known as:  SYNTHROID    Take 1 Tab by mouth every day.   Dose:  137 mcg       omeprazole 40 MG delayed-release capsule   Commonly known as:  PRILOSEC    Take 1 Cap by mouth every day.   Dose:  40 mg       ondansetron 4 MG Tbdp   Commonly known as:  ZOFRAN ODT    Take 1 Tab by mouth every 8 hours as needed for Nausea/Vomiting. Nausea and vomiting   Dose:  4 mg       oxycodone immediate release 10 MG immediate  release tablet   Commonly known as:  ROXICODONE    Doctor's comments:  Refill after 3/15/17   Take 1-3 Tabs by mouth every 6 hours as needed for Moderate Pain.   Dose:  10-30 mg       polyethylene glycol/lytes Pack   Commonly known as:  MIRALAX    Take 17 g by mouth every day.   Dose:  17 g       pravastatin 20 MG Tabs   Commonly known as:  PRAVACHOL    Take 1 Tab by mouth every day.   Dose:  20 mg       predniSONE 5 MG Tabs   Commonly known as:  DELTASONE    Take 7 mg by mouth every morning.   Dose:  7 mg       PROBIOTIC DAILY PO    Take 1 Cap by mouth every day.   Dose:  1 Cap       sennosides 8.6 MG Tabs   Commonly known as:  SENOKOT    Take 17.2 mg by mouth 2 times a day.   Dose:  17.2 mg       sertraline 100 MG Tabs   Commonly known as:  ZOLOFT    Take 100 mg by mouth every bedtime.   Dose:  100 mg       SYSTANE OP    1 Drop by Ophthalmic route 3 times a day.   Dose:  1 Drop       tacrolimus 0.5 MG Caps   Commonly known as:  PROGRAF    Take 0.5-2 mg by mouth 2 times a day. 2 mg at 0800 1.5 mg at 2000   Dose:  0.5-2 mg       tiotropium 18 MCG Caps   Commonly known as:  SPIRIVA    Inhale 1 Cap by mouth every day.   Dose:  18 mcg       trazodone 50 MG Tabs   Commonly known as:  DESYREL    Take 1-2 Tabs by mouth at bedtime as needed for Sleep.   Dose:   mg       valacyclovir 500 MG Tabs   Commonly known as:  VALTREX    Take 1 Tab by mouth 2 Times a Day.   Dose:  500 mg

## 2017-03-21 NOTE — DISCHARGE INSTRUCTIONS
See your gastroenterologist on Monday as planned for further evaluation. Drink lots of fluids to maintain hydration. You may use MiraLAX if needed for constipation. Return here if you have new or worsening symptoms.

## 2017-03-21 NOTE — ED NOTES
"Chief Complaint   Patient presents with   • Dizziness     pt reports that she had a liver transplant in 12/2011, concerned today that she is having a liver rejection. general appearance unwell.    • Headache   • Neck Pain     BIB [facility] to [facility], pt on 4L home O2. States that her \"sats were in the 60's last night.\" sats 95% in triage on 4L. Pt looking down for triage. Mask applied prior to triage. Pt placed in front of tech in [facility] for close observation.   Blood pressure 109/50, pulse 101, temperature 37.5 °C (99.5 °F), temperature source Oral, resp. rate 16, height 1.727 m (5' 8\"), last menstrual period 01/03/2000, SpO2 94 %.  Pt informed of wait times. Educated on triage process.  Asked to return to triage RN for any new or worsening of symptoms. Thanked for patience.        "

## 2017-03-21 NOTE — IP AVS SNAPSHOT
3/27/2017          Roxana Cuba  [address]  [place] [address]    Dear Roxana:    [facility] wants to ensure your discharge home is safe and you or your loved ones have had all your questions answered regarding your care after you leave the hospital.    You may receive a telephone call within two days of your discharge.  This call is to make certain you understand your discharge instructions as well as ensure we provided you with the best care possible during your stay with us.     The call will only last approximately 3-5 minutes and will be done by a nurse.    Once again, we want to ensure your discharge home is safe and that you have a clear understanding of any next steps in your care.  If you have any questions or concerns, please do not hesitate to contact us, we are here for you.  Thank you for choosing [facility] for your healthcare needs.    Sincerely,    Fredrick Figueroa    [facility]

## 2017-03-21 NOTE — ED PROVIDER NOTES
ED Provider Note    CHIEF COMPLAINT  Chief Complaint   Patient presents with   • Dizziness     pt reports that she had a liver transplant in 12/2011, concerned today that she is having a liver rejection. general appearance unwell.    • Headache   • Neck Pain       HPI  Roxana Cuba is a 57 y.o. female who presents for evaluation of general dizziness headache, nausea fevers neck stiffness. The patient has negative the past medical history as listed below. Of note she is status post liver transplant about 4 years ago. Some therapy. She was seen here several times over the past few days for various complaints laboratory studies CT scan of the abdomen and pelvis was nonspecific. She had no fevers at that time. Here she presents febrile tachycardic clinically ill-appearing. She does report cough, headache and neck stiffness.    REVIEW OF SYSTEMS  See HPI for further details. Positive for cough headache and neck stiffness All other systems are negative.     PAST MEDICAL HISTORY  Past Medical History   Diagnosis Date   • Cirrhosis (CMS-HCC) December 2011     Status post liver transplant at [facility]   • S/P cholecystectomy    • GERD (gastroesophageal reflux disease)          • Psychiatric disorder      Mood disorder   • Fracture of left foot    • Bronchitis       2016   • CKD (chronic kidney disease) stage 3, GFR 30-59 ml/min    • Breath shortness    • Chronic fatigue and malaise    • VRE (vancomycin-resistant Enterococci)      02-17-17, pt declines knowledge of this   • Low back pain 02-17-17     and left foot, 7/10   • Pneumonia      aspiration,    • Mild aortic regurgitation and aortic valve sclerosis     • Splenomegaly    • Small bowel obstruction (CMS-HCC)      01-   • On home oxygen therapy      4 liters, Dr. Gaming   • Pulmonary hypertension (CMS-HCC)         • Diabetes ([facility])      reports hx of, resolved w/weight loss. Reports still checking bloodsugars twice daily and using insulin PRN   •  "Hypothyroid    • H/O Clostridium difficile infection 02-17-17     reports \"16 months ago\". Current stool sample 01-26-17 neg   • Platelet disorder (CMS-HCC)      low platelets       FAMILY HISTORY  Noncontributory    SOCIAL HISTORY  Social History     Social History   • Marital Status:      Spouse Name: N/A   • Number of Children: N/A   • Years of Education: N/A     Social History Main Topics   • Smoking status: Passive Smoke Exposure - Never Smoker   • Smokeless tobacco: Never Used      Comment: avoid all tobacco products   • Alcohol Use: No      Comment: 05/2009 quit drinking   • Drug Use: No   • Sexual Activity: Not on file     Other Topics Concern   • Not on file     Social History Narrative    no drugs or alcohol     SURGICAL HISTORY  Past Surgical History   Procedure Laterality Date   • Pr anesth,nose,sinus surgery     • Latricia by laparoscopy  9/19/2009     Performed by BIRD ABDI at SURGERY [facility]   • Exam under anesthesia  11/11/2009     Performed by BIRD ABDI at SURGERY [facility]   • Hysterectomy, total abdominal           • Other       Breast reduction   • Gastroscopy-endo  3/12/2010     Performed by CAMELIA SAMANO at ENDOSCOPY [facility] ORS   • Bronchoscopy-endo  5/29/2012     Performed by SUYAPA CAMARENA at ENDOSCOPY [facility]   • Bronchoscopy-endo  6/19/2012     Performed by MARLENA MURILLO at ENDOSCOPY [facility] ORS   • Sigmoidoscopy flex  9/26/2012     Performed by Kristopher Cardoso M.D. at ENDOSCOPY [facility] ORS   • Recovery  2/27/2013     Performed by Ir[facility]Rec[facility] Surgery at SURGERY SAME DAY [facility]S   • Bronchoscopy-endo  11/15/2013     Performed by Ha Perez M.D. at ENDOSCOPY [facility] ORS   • Recovery  1/21/2014     Performed by [facility]Recovery Surgery at SURGERY SAME DAY [facility] ORS   • Recovery  3/24/2014     Performed by Cath-Recovery Surgery at SURGERY SAME DAY [facility] ORS   • Hemorrhoidectomy  11/11/2009     Performed by " BIRD ABDI at SURGERY [facility]   • Gastroscopy  9/28/2012     Performed by Aravind Richards M.D. at SURGERY [facility]   • Carpal tunnel release           • Bone marrow aspiration  12/31/2012     Performed by Josemanuel Real M.D. at ENDOSCOPY [facility]   • Bone marrow biopsy, ndl/trocar  12/31/2012     Performed by Josemanuel Real M.D. at ENDOSCOPY [facility]   • Recovery  3/31/2014     Performed by [facility] at SURGERY [facility]   • Other abdominal surgery  December 2011     Liver transplant at [facility] by Dr. Canada.   • Bone marrow aspiration  3/16/2015     Performed by Freddie Hi M.D. at ENDOSCOPY [facility]   • Bone marrow biopsy, ndl/trocar  3/16/2015     Performed by Freddie Hi M.D. at ENDOSCOPY [facility]   • Lung biopsy open  11/2016   • Tonsillectomy     • Other abdominal surgery       Gasric Sleeve   • Breast biopsy Left 2/21/2017     Procedure: BREAST BIOPSY - WIRE LOCALIZED EXCISONAL ;  Surgeon: Jane Zhao M.D.;  Location: SURGERY SAME DAY [facility];  Service:        CURRENT MEDICATIONS  Home Medications     Reviewed by Colleen Louis (Pharmacy Tech) on 03/21/17 at 1147  Med List Status: Partial    Medication Last Dose Status    alprazolam (XANAX) 0.5 MG Tab 3/20/2017 Active    ambrisentan (LETAIRIS) 10 MG tablet 3/20/2017 Active    ascorbic acid (ASCORBIC ACID) 500 MG Tab  Active    aspirin EC (ECOTRIN) 81 MG Tablet Delayed Response 3/21/2017 Active    CITRACAL MAXIMUM 315-250 MG-UNIT Tab 3/20/2017 Active    cyclobenzaprine (FLEXERIL) 10 MG Tab 3/20/2017 Active    docusate sodium (COLACE) 100 MG Cap 3/21/2017 Active    ferrous sulfate 325 (65 FE) MG EC tablet 3/20/2017 Active    fluticasone (FLONASE) 50 MCG/ACT nasal spray 3/20/2017 Active    furosemide (LASIX) 40 MG Tab 3/20/2017 Active    gabapentin (NEURONTIN) 600 MG tablet 3/20/2017 Active    insulin glargine (LANTUS SOLOSTAR) 100 UNIT/ML Solution Pen-injector  "injection 3/15/2017 Active    insulin lispro (HUMALOG) 100 UNIT/ML Solution >month Active    levothyroxine (SYNTHROID) 137 MCG Tab 3/21/2017 Active    mycophenolate (CELLCEPT) 250 MG Cap 3/21/2017 Active    omeprazole (PRILOSEC) 40 MG delayed-release capsule 3/21/2017 Active    ondansetron (ZOFRAN ODT) 4 MG TABLET DISPERSIBLE 3/20/2017 Active    oxycodone immediate release (ROXICODONE) 10 MG immediate release tablet 3/20/2017 Active    Polyethyl Glycol-Propyl Glycol (SYSTANE OP)  Active    polyethylene glycol/lytes (MIRALAX) Pack  Active    pravastatin (PRAVACHOL) 20 MG Tab 3/20/2017 Active    predniSONE (DELTASONE) 5 MG Tab 3/21/2017 Active    Probiotic Product (PROBIOTIC DAILY PO) 3/20/2017 Active    sennosides (SENOKOT) 8.6 MG Tab 3/21/2017 Active    sertraline (ZOLOFT) 100 MG Tab 3/20/2017 Active    tacrolimus (PROGRAF) 0.5 MG Cap 3/21/2017 Active    Tadalafil, PAH, (ADCIRCA) 20 MG Tab 3/20/2017 Active    tiotropium (SPIRIVA) 18 MCG Cap 3/20/2017 Active    trazodone (DESYREL) 50 MG Tab 3/20/2017 Active                ALLERGIES  Allergies   Allergen Reactions   • Rhubarb Anaphylaxis   • Vancomycin Hcl      Causes red man syndrome when infused to fast         PHYSICAL EXAM  VITAL SIGNS: /58 mmHg  Pulse 85  Temp(Src) 37.5 °C (99.5 °F) (Oral)  Resp 10  Ht 1.727 m (5' 8\")  SpO2 95%  LMP 01/03/2000  oral temperature 100.9    Constitutional: Patient appears ill  HENT: Normocephalic, Atraumatic, Bilateral external ears normal, Oropharynx moist, No oral exudates, Nose normal.   Eyes: PERRLA, EOMI, Conjunctiva normal, No discharge.   Neck: . Neck stiffness is noted   Lymphatic: No lymphadenopathy noted.   Cardiovascular: Tachycardic, Normal rhythm, No murmurs, No rubs, No gallops.   Thorax & Lungs: Bilateral rhonchi noted, No wheezing, No chest tenderness.   Abdomen: Bowel sounds normal, Soft, No tenderness, No masses, No pulsatile masses.   Skin: Warm, Dry, No erythema, No rash.   Back: No tenderness, No CVA " tenderness.   Extremities: Intact distal pulses, No edema, No tenderness, No cyanosis, No clubbing.   Neurologic: Alert & oriented x 3, Normal motor function, Normal sensory function, No focal deficits noted.   Psychiatric: Affect normal, Judgment normal, Mood normal.       RADIOLOGY/PROCEDURES  CT-HEAD W/O   Final Result      No acute intracranial abnormality is identified.      Paranasal sinus disease.      DX-CHEST-PORTABLE (1 VIEW)   Final Result      Perihilar and bibasilar opacities are increased and may represent atelectasis or pneumonitis.      Small left pleural effusion may be present.      Sclerotic lesion in the proximal left humerus may represent an intramedullary infarct or an enchondroma.           Results for orders placed or performed during the hospital encounter of 03/21/17   LACTIC ACID   Result Value Ref Range    Lactic Acid 1.0 0.5 - 2.0 mmol/L   INFLUENZA RAPID   Result Value Ref Range    Significant Indicator NEG     Source RESP     Site Nasopharyngeal     Rapid Influenza A-B       Negative for Influenza A and Influenza B antigens.  Infection due to influenza A or B cannot be ruled out  since the antigen present in the specimen may be below the  detection limit of the test. Culture confirmation of  negative samples is recommended.     CBC WITH DIFFERENTIAL   Result Value Ref Range    WBC 6.5 4.8 - 10.8 K/uL    RBC 4.00 (L) 4.20 - 5.40 M/uL    Hemoglobin 11.5 (L) 12.0 - 16.0 g/dL    Hematocrit 35.6 (L) 37.0 - 47.0 %    MCV 89.0 81.4 - 97.8 fL    MCH 28.8 27.0 - 33.0 pg    MCHC 32.3 (L) 33.6 - 35.0 g/dL    RDW 47.8 35.9 - 50.0 fL    Platelet Count 66 (L) 164 - 446 K/uL    MPV 9.5 9.0 - 12.9 fL    Neutrophils-Polys 88.20 (H) 44.00 - 72.00 %    Lymphocytes 5.40 (L) 22.00 - 41.00 %    Monocytes 5.20 0.00 - 13.40 %    Eosinophils 0.60 0.00 - 6.90 %    Basophils 0.30 0.00 - 1.80 %    Immature Granulocytes 0.30 0.00 - 0.90 %    Nucleated RBC 1.10 /100 WBC    Neutrophils (Absolute) 5.77 2.00 - 7.15  K/uL    Lymphs (Absolute) 0.35 (L) 1.00 - 4.80 K/uL    Monos (Absolute) 0.34 0.00 - 0.85 K/uL    Eos (Absolute) 0.04 0.00 - 0.51 K/uL    Baso (Absolute) 0.02 0.00 - 0.12 K/uL    Immature Granulocytes (abs) 0.02 0.00 - 0.11 K/uL    NRBC (Absolute) 0.07 K/uL   COMP METABOLIC PANEL   Result Value Ref Range    Sodium 141 135 - 145 mmol/L    Potassium 3.6 3.6 - 5.5 mmol/L    Chloride 105 96 - 112 mmol/L    Co2 30 20 - 33 mmol/L    Anion Gap 6.0 0.0 - 11.9    Glucose 153 (H) 65 - 99 mg/dL    Bun 9 8 - 22 mg/dL    Creatinine 0.96 0.50 - 1.40 mg/dL    Calcium 8.9 8.5 - 10.5 mg/dL    AST(SGOT) 16 12 - 45 U/L    ALT(SGPT) 13 2 - 50 U/L    Alkaline Phosphatase 28 (L) 30 - 99 U/L    Total Bilirubin 0.4 0.1 - 1.5 mg/dL    Albumin 3.7 3.2 - 4.9 g/dL    Total Protein 5.7 (L) 6.0 - 8.2 g/dL    Globulin 2.0 1.9 - 3.5 g/dL    A-G Ratio 1.9 g/dL   PROTHROMBIN TIME   Result Value Ref Range    PT 12.9 12.0 - 14.6 sec    INR 0.94 0.87 - 1.13   APTT   Result Value Ref Range    APTT 25.9 24.7 - 36.0 sec   CSF PROTEIN   Result Value Ref Range    Total Protein, CSF 46 (H) 15 - 45 mg/dL   CSF GLUCOSE   Result Value Ref Range    Glucose CSF 75 40 - 80 mg/dL   CSF CELL COUNT   Result Value Ref Range    Number Of Tubes 4     Volume 4.0 mL    Color-Body Fluid Colorless     Character-Body Fluid Clear     Supernatant Appearance Colorless     Total RBC Count 9 cells/uL    Crenated RBC 0 %    Total WBC Count 2 0 - 10 cells/uL    Lymphs 82 %    Mononuclear Cells - CSF 18 %    CSF Tube Number 4    ESTIMATED GFR   Result Value Ref Range    GFR If African American >60 >60 mL/min/1.73 m 2    GFR If Non African American 60 >60 mL/min/1.73 m 2   GRAM STAIN   Result Value Ref Range    Significant Indicator .     Source CSF     Site TAP     Gram Stain Result No organisms seen.      *Note: Due to a large number of results and/or encounters for the requested time period, some results have not been displayed. A complete set of results can be found in Results  Review.      Lumbar Puncture Procedure Note    Indication: Rule out meningitis    Consent: Written consent    Procedure: The patient was placed in the sitting upright position and the appropriate landmarks were identified. The area was prepped and draped in the usual sterile fashion. Anesthesia was obtained using 5 cc of 1% Lidocaine without epinephrine. A spinal needle was inserted at the L4- L5 level with the stylet in place until spinal fluid was returned. Opening pressure was not measured. At this point 4.0 cc of clear cerebral spinal fluid was obtained and sent for appropriate testing. The stylet was then replaced and the needle was withdrawn. A sterile dressing was placed over the site and the patient was placed in the supine position.    The patient tolerated the procedure well.    Complications: None      COURSE & MEDICAL DECISION MAKING  Pertinent Labs & Imaging studies reviewed. (See chart for details)  Septic protocol was initiated. The patient had SIRS. With her immunosuppressive state except a workup was performed including chest x-ray laboratory studies, CT scan. Lumbar puncture has been performed as well. Once blood cultures were obtained the patient was given broad-spectrum IV antibiotics. I did not administer vancomycin due to her prior hypersensitivity to this area she was given Tylenol as well as a 2000 mL fluid bolus     FINAL IMPRESSION  1. SIRS  2. Viral syndrome versus bacteremia    Admission      Electronically signed by: Ramses Gregorio, 3/21/2017 10:31 AM

## 2017-03-21 NOTE — IP AVS SNAPSHOT
" Home Care Instructions                                                                                                                  Name:Roxana Cuba  Medical Record Number:[id number]  CSN: [id number]    YOB: 1960   Age: 57 y.o.  Sex: female  HT:1.727 m (5' 7.99\") WT: 77 kg (169 lb 12.1 oz)          Admit Date: 3/21/2017     Discharge Date:   Today's Date: 3/27/2017  Attending Doctor:  ALEJO Buckner*                  Allergies:  Rhubarb and Vancomycin hcl            Discharge Instructions       Discharge Instructions    Discharged to home by car with friend. Discharged via wheelchair, hospital escort: Yes.  Special equipment needed: Oxygen    Be sure to schedule a follow-up appointment with your primary care doctor or any specialists as instructed.     Discharge Plan:   Diet Plan: Discussed (advance as tolerated)  Activity Level: Discussed (as tolerated)  Confirmed Follow up Appointment: Appointment Scheduled  Confirmed Symptoms Management: Discussed  Medication Reconciliation Updated: Yes  Influenza Vaccine Indication: Patient Refuses    I understand that a diet low in cholesterol, fat, and sodium is recommended for good health. Unless I have been given specific instructions below for another diet, I accept this instruction as my diet prescription.   Other diet: advance as tolerated    Special Instructions: None    · Is patient discharged on Warfarin / Coumadin?   No     · Is patient Post Blood Transfusion?  No    Constipation, Adult  Constipation is when a person has fewer than three bowel movements a week, has difficulty having a bowel movement, or has stools that are dry, hard, or larger than normal. As people grow older, constipation is more common. A low-fiber diet, not taking in enough fluids, and taking certain medicines may make constipation worse.   CAUSES   · Certain medicines, such as antidepressants, pain medicine, iron supplements, antacids, and water pills.    · Certain " diseases, such as diabetes, irritable bowel syndrome (IBS), thyroid disease, or depression.    · Not drinking enough water.    · Not eating enough fiber-rich foods.    · Stress or travel.    · Lack of physical activity or exercise.    · Ignoring the urge to have a bowel movement.    · Using laxatives too much.    SIGNS AND SYMPTOMS   · Having fewer than three bowel movements a week.    · Straining to have a bowel movement.    · Having stools that are hard, dry, or larger than normal.    · Feeling full or bloated.    · Pain in the lower abdomen.    · Not feeling relief after having a bowel movement.    DIAGNOSIS   Your health care provider will take a medical history and perform a physical exam. Further testing may be done for severe constipation. Some tests may include:  · A barium enema X-ray to examine your rectum, colon, and, sometimes, your small intestine.    · A sigmoidoscopy to examine your lower colon.    · A colonoscopy to examine your entire colon.  TREATMENT   Treatment will depend on the severity of your constipation and what is causing it. Some dietary treatments include drinking more fluids and eating more fiber-rich foods. Lifestyle treatments may include regular exercise. If these diet and lifestyle recommendations do not help, your health care provider may recommend taking over-the-counter laxative medicines to help you have bowel movements. Prescription medicines may be prescribed if over-the-counter medicines do not work.   HOME CARE INSTRUCTIONS   · Eat foods that have a lot of fiber, such as fruits, vegetables, whole grains, and beans.  · Limit foods high in fat and processed sugars, such as french fries, hamburgers, cookies, candies, and soda.    · A fiber supplement may be added to your diet if you cannot get enough fiber from foods.    · Drink enough fluids to keep your urine clear or pale yellow.    · Exercise regularly or as directed by your health care provider.    · Go to the restroom  when you have the urge to go. Do not hold it.    · Only take over-the-counter or prescription medicines as directed by your health care provider. Do not take other medicines for constipation without talking to your health care provider first.    SEEK IMMEDIATE MEDICAL CARE IF:   · You have bright red blood in your stool.    · Your constipation lasts for more than 4 days or gets worse.    · You have abdominal or rectal pain.    · You have thin, pencil-like stools.    · You have unexplained weight loss.  MAKE SURE YOU:   · Understand these instructions.  · Will watch your condition.  · Will get help right away if you are not doing well or get worse.     This information is not intended to replace advice given to you by your health care provider. Make sure you discuss any questions you have with your health care provider.     Document Released: 09/15/2005 Document Revised: 01/08/2016 Document Reviewed: 09/29/2014  [employer] Patient Education ©2016 [employer]      Depression / Suicide Risk    As you are discharged from this [facility] facility, it is important to learn how to keep safe from harming yourself.    Recognize the warning signs:  · Abrupt changes in personality, positive or negative- including increase in energy   · Giving away possessions  · Change in eating patterns- significant weight changes-  positive or negative  · Change in sleeping patterns- unable to sleep or sleeping all the time   · Unwillingness or inability to communicate  · Depression  · Unusual sadness, discouragement and loneliness  · Talk of wanting to die  · Neglect of personal appearance   · Rebelliousness- reckless behavior  · Withdrawal from people/activities they love  · Confusion- inability to concentrate     If you or a loved one observes any of these behaviors or has concerns about self-harm, here's what you can do:  · Talk about it- your feelings and reasons for harming yourself  · Remove any means that you might use  to hurt yourself (examples: pills, rope, extension cords, firearm)  · Get professional help from the community (Mental Health, Substance Abuse, psychological counseling)  · Do not be alone:Call your Safe Contact- someone whom you trust who will be there for you.  · Call your local CRISIS HOTLINE 160-5077 or 518-305-0080  · Call your local Children's Mobile Crisis Response Team [place] (474) 531-8493 or www.[employer]  · Call the toll free National Suicide Prevention Hotlines   · National Suicide Prevention Lifeline 938-019-KUXN (8646)  · National Hope Line Network 800-SUICIDE (749-2570)        Your appointments     Apr 03, 2017 10:30 AM   FOLLOW UP with Maxwell Phan M.D.   [facility] (--)    [address]  [place] [address]   [phone]            Apr 05, 2017  8:00 AM   Adult Draw/Collection with [facility]   [facility] (--)    [address]  [place] [address]   [phone]            Apr 12, 2017 10:00 AM   Established Patient with Bernarda Reyes D.O.   [facility] [place]    [address], [address]  [place] [id number] [phone]           You will be receiving a confirmation call a few days before your appointment from our automated call confirmation system.            May 10, 2017  8:00 AM   Adult Draw/Collection with [facility] (--)    [address]  [place] [address]   [phone]            Jun 07, 2017  8:00 AM   Adult Draw/Collection with [facility]   [facility] (--)    [address]  [place] [address]   [phone]                 Discharge Medication Instructions:    Below are the medications your physician expects you to take upon discharge:    Review all your home medications and newly ordered medications with your doctor and/or pharmacist. Follow medication instructions as directed by your doctor and/or pharmacist.    Please keep your medication list with you and share with  your physician.               Medication List      START taking these medications        Instructions    valacyclovir 500 MG Tabs   Last time this was given:  500 mg on 3/26/2017  9:27 PM   Commonly known as:  VALTREX   Next Dose Due:  3/27 when home    Take 1 Tab by mouth 2 Times a Day.   Dose:  500 mg         CHANGE how you take these medications        Instructions    cyclobenzaprine 10 MG Tabs   What changed:  when to take this   Last time this was given:  10 mg on 3/26/2017  6:01 PM   Commonly known as:  FLEXERIL   Next Dose Due:  3/27 when home    Take 1 Tab by mouth 2 times a day as needed.   Dose:  10 mg       ferrous sulfate 220 (44 FE) MG/5ML Elix   What changed:  Another medication with the same name was removed. Continue taking this medication, and follow the directions you see here.   Commonly known as:  FEOSOL   Next Dose Due:  3/27 when home    Take 5 mL by mouth every day.   Dose:  220 mg         CONTINUE taking these medications        Instructions    ADCIRCA 20 MG Tabs   Last time this was given:  40 mg on 3/26/2017  9:00 PM   Generic drug:  Tadalafil (PAH)   Next Dose Due:  3/27  At 9 pm      Take 40 mg by mouth every bedtime. Indications: Pulmonary Arterial Hypertension   Dose:  40 mg       alprazolam 0.5 MG Tabs   Last time this was given:  0.5 mg on 3/26/2017  9:24 PM   Commonly known as:  XANAX   Next Dose Due:  3/27 when home    Doctor's comments:  Refill after 2/11/17   Take 1 Tab by mouth 3 times a day.   Dose:  0.5 mg       ambrisentan 10 MG tablet   Last time this was given:  10 mg on 3/26/2017  9:00 AM   Commonly known as:  LETAIRIS   Next Dose Due:  3/27 when home    Take 1 Tab by mouth every day.   Dose:  10 mg       ascorbic acid 500 MG Tabs   Commonly known as:  ascorbic acid   Next Dose Due:  3/27 when home    Take 500 mg by mouth every day.   Dose:  500 mg       aspirin EC 81 MG Tbec   Last time this was given:  81 mg on 3/22/2017  8:20 AM   Commonly known as:  ECOTRIN   Next Dose  Due:  3/27 when home    Take 1 Tab by mouth every morning.   Dose:  81 mg       CELLCEPT 250 MG Caps   Last time this was given:  500 mg on 3/26/2017  9:24 PM   Generic drug:  mycophenolate    Take 500 mg by mouth 2 times a day.   Dose:  500 mg       CITRACAL MAXIMUM 315-250 MG-UNIT Tabs   Generic drug:  Calcium Citrate-Vitamin D   Next Dose Due:  3/27 when home    TAKE 2 TABS BY MOUTH 2X/ DAY WITH FOOD BEFORE AND AFTER DINNER FOR LIFE-CRUSH 1ST 3 MONTH, SPACE MVI       docusate sodium 100 MG Caps   Commonly known as:  COLACE   Next Dose Due:  3/27 when home    Take 100 mg by mouth 2 times a day.   Dose:  100 mg       fluticasone 50 MCG/ACT nasal spray   Last time this was given:  50 mcg on 3/27/2017  9:21 AM   Commonly known as:  FLONASE   Next Dose Due:  3/27 when home    [place]e 1 Spray in nose every day.   Dose:  1 Spray       furosemide 40 MG Tabs   Commonly known as:  LASIX   Next Dose Due:  3/27 when home    Take 40 mg by mouth every morning.   Dose:  40 mg       gabapentin 600 MG tablet   Commonly known as:  NEURONTIN   Next Dose Due:  3/27 when home    Doctor's comments:  Authorization of a refill request.   Take 1 Tab by mouth 3 times a day.   Dose:  600 mg       levothyroxine 137 MCG Tabs   Last time this was given:  137 mcg on 3/27/2017  5:59 AM   Commonly known as:  SYNTHROID   Next Dose Due:  3/28  At 6 am    Take 1 Tab by mouth every day.   Dose:  137 mcg       omeprazole 40 MG delayed-release capsule   Last time this was given:  40 mg on 3/26/2017  8:19 AM   Commonly known as:  PRILOSEC   Next Dose Due:  3/27 when home    Take 1 Cap by mouth every day.   Dose:  40 mg       ondansetron 4 MG Tbdp   Last time this was given:  4 mg on 3/25/2017  4:45 PM   Commonly known as:  ZOFRAN ODT   Next Dose Due:  3/27 when home    Take 1 Tab by mouth every 8 hours as needed for Nausea/Vomiting. Nausea and vomiting   Dose:  4 mg       oxycodone immediate release 10 MG immediate release tablet   Last time this was  given:  30 mg on 3/27/2017  6:19 AM   Commonly known as:  ROXICODONE   Next Dose Due:  3/27 when home    Doctor's comments:  Refill after 3/15/17   Take 1-3 Tabs by mouth every 6 hours as needed for Moderate Pain.   Dose:  10-30 mg       polyethylene glycol/lytes Pack   Commonly known as:  MIRALAX   Next Dose Due:  3/27 when home    Take 17 g by mouth every day.   Dose:  17 g       pravastatin 20 MG Tabs   Last time this was given:  20 mg on 3/26/2017  9:24 PM   Commonly known as:  PRAVACHOL   Next Dose Due:  3/27 at 9 pm    Take 1 Tab by mouth every day.   Dose:  20 mg       predniSONE 5 MG Tabs   Last time this was given:  7 mg on 3/26/2017  8:21 AM   Commonly known as:  DELTASONE   Next Dose Due:  3/27 when home    Take 7 mg by mouth every morning.   Dose:  7 mg       PROBIOTIC DAILY PO   Next Dose Due:  3/27 when home    Take 1 Cap by mouth every day.   Dose:  1 Cap       sennosides 8.6 MG Tabs   Commonly known as:  SENOKOT   Next Dose Due:  3/27 when home    Take 17.2 mg by mouth 2 times a day.   Dose:  17.2 mg       sertraline 100 MG Tabs   Last time this was given:  100 mg on 3/26/2017  9:23 PM   Commonly known as:  ZOLOFT   Next Dose Due:  3/27  At 9 pm      Take 100 mg by mouth every bedtime.   Dose:  100 mg       SYSTANE OP   Next Dose Due:  3/27 when home    1 Drop by Ophthalmic route 3 times a day.   Dose:  1 Drop       tacrolimus 0.5 MG Caps   Last time this was given:  1.5 mg on 3/26/2017  9:26 PM   Commonly known as:  PROGRAF   Next Dose Due:  3/27 when home    Take 0.5-2 mg by mouth 2 times a day. 2 mg at 0800 1.5 mg at 2000   Dose:  0.5-2 mg       tiotropium 18 MCG Caps   Last time this was given:  1 Cap on 3/26/2017  8:23 AM   Commonly known as:  SPIRIVA   Next Dose Due:  3/27 when home    Inhale 1 Cap by mouth every day.   Dose:  18 mcg       trazodone 50 MG Tabs   Last time this was given:  100 mg on 3/25/2017 10:45 PM   Commonly known as:  DESYREL   Next Dose Due:  3/27 at 9 pm    Take 1-2 Tabs  by mouth at bedtime as needed for Sleep.   Dose:   mg               Instructions           Diet / Nutrition:    Follow any diet instructions given to you by your doctor or the dietician, including how much salt (sodium) you are allowed each day.    If you are overweight, talk to your doctor about a weight reduction plan.    Activity:    Remain physically active following your doctor's instructions about exercise and activity.    Rest often.     Any time you become even a little tired or short of breath, SIT DOWN and rest.    Worsening Symptoms:    Report any of the following signs and symptoms to the doctor's office immediately:    *Pain of jaw, arm, or neck  *Chest pain not relieved by medication                               *Dizziness or loss of consciousness  *Difficulty breathing even when at rest   *More tired than usual                                       *Bleeding drainage or swelling of surgical site  *Swelling of feet, ankles, legs or stomach                 *Fever (>100ºF)  *Pink or blood tinged sputum  *Weight gain (3lbs/day or 5lbs /week)           *Shock from internal defibrillator (if applicable)  *Palpitations or irregular heartbeats                *Cool and/or numb extremities    Stroke Awareness    Common Risk Factors for Stroke include:    Age  Atrial Fibrillation  Carotid Artery Stenosis  Diabetes Mellitus  Excessive alcohol consumption  High blood pressure  Overweight   Physical inactivity  Smoking    Warning signs and symptoms of a stroke include:    *Sudden numbness or weakness of the face, arm or leg (especially on one side of the body).  *Sudden confusion, trouble speaking or understanding.  *Sudden trouble seeing in one or both eyes.  *Sudden trouble walking, dizziness, loss of balance or coordination.Sudden severe headache with no known cause.    It is very important to get treatment quickly when a stroke occurs. If you experience any of the above warning signs, call 911  immediately.                   Disclaimer         Quit Smoking / Tobacco Use:    I understand the use of any tobacco products increases my chance of suffering from future heart disease or stroke and could cause other illnesses which may shorten my life. Quitting the use of tobacco products is the single most important thing I can do to improve my health. For further information on smoking / tobacco cessation call a Toll Free Quit Line at 1-330.768.3731 (*[employer] [place]) or 1-414.676.9511 (American Lung Association) or you can access the web based program at www.lungusa.org.    [place] Tobacco Users Help Line:  (266) 156-3472       Toll Free: 1-355.771.6356  Quit Tobacco Program [facility] (252)335-6826    Crisis Hotline:    [place] Crisis Hotline:  6-960-NXICVOX or 1-755.507.7010    [place] Crisis Hotline:    1-317.360.3756 or 924-044-6479    Discharge Survey:   Thank you for choosing [facility]. We hope we did everything we could to make your hospital stay a pleasant one. You may be receiving a phone survey and we would appreciate your time and participation in answering the questions. Your input is very valuable to us in our efforts to improve our service to our patients and their families.        My signature on this form indicates that:    1. I have reviewed and understand the above information.  2. My questions regarding this information have been answered to my satisfaction.  3. I have formulated a plan with my discharge nurse to obtain my prescribed medications for home.                  Disclaimer         __________________________________                     __________       ________                       Patient Signature                                                 Date                    Time

## 2017-03-21 NOTE — ED NOTES
IV d/c, pt given d/c paperwork, no acute distress noted, pt taken curbside via wheelchair on personal O2, vss at time of d/c

## 2017-03-21 NOTE — ED NOTES
Med rec complete per patient and S/O at bedside with med list  Allergies reviewed    Patient stated she had a pre-med Amoxi for dentist apt last week  S/O brought Adcirca and Letairis since the pharmacy does not carry these med's

## 2017-03-21 NOTE — ED PROVIDER NOTES
ED Provider Note    CHIEF COMPLAINT  Chief Complaint   Patient presents with   • Abdominal Pain     Lower abdominal distention noted.        HPI  Roxana Cuba is a 57 y.o. female who presents to the emergency department complaining of abdominal pain and distention. The patient tells me she had a small bowel obstruction in January of this year. She says she was hospitalized and started on iron after that hospitalization and since then she has been suffering from constipation. The patient says that she did have a bowel movement yesterday after having an enema at [facility] emergency department where the patient was evaluated and had x-rays and then ultimately discharged home with his prescription for GoLYTELY. The patient says that she did drink the entire bottle of GoLYTELY but has not had any further bowel movements until arriving here in the emergency department and nursing staff reports that she did have a large bowel movement here. The patient says she has an appointment on Monday with her gastroenterologist and is scheduled for colonoscopy as well as endoscopy at that time    REVIEW OF SYSTEMS no fever or chills no nausea or vomiting no pain or discomfort with urination. All other systems negative    PAST MEDICAL HISTORY  Past Medical History   Diagnosis Date   • Cirrhosis (CMS-HCC) December 2011     [place] [facility] [place]/P cholecystectomy    • GERD (gastroesophageal reflux disease)          • Psychiatric disorder      Mood disorder   • Fracture of left foot    • Bronchitis       2016   • CKD (chronic kidney disease) stage 3, GFR 30-59 ml/min    • Breath shortness    • Chronic fatigue and malaise    • VRE (vancomycin-resistant Enterococci)      02-17-17, pt declines knowledge of this   • Low back pain 02-17-17     and left foot, 7/10   • Pneumonia      aspiration,    • Mild aortic regurgitation and aortic valve sclerosis     • Splenomegaly    • Small bowel obstruction  "(CMS-HCC)      01-   • On home oxygen therapy      4 liters, Dr. Gaming   • Pulmonary hypertension (CMS-HCC)         • [place] [place]      reports hx of, resolved w/weight loss. Reports still checking bloodsugars twice daily and using insulin PRN   • Hypothyroid    • H/O Clostridium difficile infection 02-17-17     reports \"16 months ago\". Current stool sample 01-26-17 neg   • Platelet disorder (CMS-HCC)      low platelets       FAMILY HISTORY  Family History   Problem Relation Age of Onset   • Other Father      Unknown (dead 10 years)   • Diabetes Father    • Heart Disease Father    • Hypertension Father    • Hyperlipidemia Father    • Stroke Father    • Non-contributory Mother    • Hyperlipidemia Mother    • Alcohol/Drug Mother    • Cancer Paternal Aunt    • Alcohol/Drug Maternal Grandmother    • Alcohol/Drug Maternal Grandfather        SOCIAL HISTORY  Social History     Social History   • Marital Status:      Spouse Name: N/A   • Number of Children: N/A   • Years of Education: N/A     Social History Main Topics   • Smoking status: Passive Smoke Exposure - Never Smoker   • Smokeless tobacco: Never Used      Comment: avoid all tobacco products   • Alcohol Use: No      Comment: 05/2009 quit drinking   • Drug Use: No   • Sexual Activity: Not Asked     Other Topics Concern   • None     Social History Narrative       SURGICAL HISTORY  Past Surgical History   Procedure Laterality Date   • Pr anesth,nose,sinus surgery     • Latricia by laparoscopy  9/19/2009     Performed by BIRD ABDI at SURGERY [facility] ORS   • Exam under anesthesia  11/11/2009     Performed by BIRD ABDI at SURGERY [facility]   • Hysterectomy, total abdominal           • Other       Breast reduction   • Gastroscopy-endo  3/12/2010     Performed by CAMELIA SAMANO at ENDOSCOPY [facility] ORS   • Bronchoscopy-endo  5/29/2012     Performed by SUYAPA CAMARENA at ENDOSCOPY [facility] ORS   • Bronchoscopy-endo  " 6/19/2012     Performed by MARLENA MURILLO at ENDOSCOPY [facility]   • Sigmoidoscopy flex  9/26/2012     Performed by Kristopher Cardoso M.D. at ENDOSCOPY [facility]   • Recovery  2/27/2013     Performed by Ir[facility]Recovery Surgery at SURGERY SAME DAY [facility]   • Bronchoscopy-endo  11/15/2013     Performed by Ha Perez M.D. at ENDOSCOPY [facility]   • Recovery  1/21/2014     Performed by Ir-Recovery Surgery at SURGERY SAME DAY [facility] ORS   • Recovery  3/24/2014     Performed by Cath-Recovery Surgery at SURGERY SAME DAY [facility]   • Hemorrhoidectomy  11/11/2009     Performed by BIRD ABDI at SURGERY [facility]   • Gastroscopy  9/28/2012     Performed by Aravind Richards M.D. at SURGERY [facility]   • Carpal tunnel release           • Bone marrow aspiration  12/31/2012     Performed by Josemanuel Real M.D. at ENDOSCOPY [facility]   • Bone marrow biopsy, ndl/trocar  12/31/2012     Performed by Josemanuel Real M.D. at ENDOSCOPY [facility]   • Recovery  3/31/2014     Performed by Ir[facility]Recovery Surgery at SURGERY [facility]   • Other abdominal surgery  December 2011     [place] [facility] [place]   • Bone marrow aspiration  3/16/2015     Performed by Marlena Hi M.D. at ENDOSCOPY [facility]   • Bone marrow biopsy, ndl/trocar  3/16/2015     Performed by Marlena Hi M.D. at ENDOSCOPY [facility]   • Lung biopsy open  11/2016   • Tonsillectomy     • Other abdominal surgery       Gasric Sleeve   • Breast biopsy Left 2/21/2017     Procedure: BREAST BIOPSY - WIRE LOCALIZED EXCISONAL ;  Surgeon: Jane Zhao M.D.;  Location: SURGERY SAME DAY [facility];  Service:        CURRENT MEDICATIONS  Home Medications     Reviewed by Dami Fink R.N. (Registered Nurse) on 03/20/17 at 1337  Med List Status: Partial    Medication Last Dose Status    alprazolam (XANAX) 0.5 MG Tab 3/19/2017 Active    ambrisentan (LETAIRIS) 10 MG  tablet 3/19/2017 Active    aspirin EC (ECOTRIN) 81 MG Tablet Delayed Response 3/19/2017 Active    CITRACAL MAXIMUM 315-250 MG-UNIT Tab 3/19/2017 Active    cyclobenzaprine (FLEXERIL) 10 MG Tab 3/15/2017 Active    docusate sodium (COLACE) 100 MG Cap 3/19/2017 Active    ferrous sulfate 325 (65 FE) MG EC tablet 3/19/2017 Active    fluticasone (FLONASE) 50 MCG/ACT nasal spray 3/18/2017 Active    furosemide (LASIX) 40 MG Tab 3/19/2017 Active    gabapentin (NEURONTIN) 600 MG tablet 3/19/2017 Active    GAVILYTE-C 240 G solution PREP FOR COLONOSCOPY 3/27 Active    HYDROmorphone (DILAUDID) 8 MG tablet 3/15/2017 Active    insulin glargine (LANTUS SOLOSTAR) 100 UNIT/ML Solution Pen-injector injection 3/15/2017 Active    insulin glargine (LANTUS) 100 UNIT/ML Solution 2/20/2017 Active    Insulin Lispro (HUMALOG SC) 2/2/2017 Active    insulin lispro (HUMALOG) 100 UNIT/ML Solution >month Active    Lactobacillus Acidophilus Powder 3/19/2017 Active    levothyroxine (SYNTHROID) 137 MCG Tab 3/19/2017 Active    mycophenolate (CELLCEPT) 250 MG Cap 3/19/2017 Active    nystatin (MYCOSTATIN) Powder 3/18/2017 Active    omeprazole (PRILOSEC) 40 MG delayed-release capsule 3/19/2017 Active    ondansetron (ZOFRAN ODT) 4 MG TABLET DISPERSIBLE 3/18/2017 Active    oxycodone immediate release (ROXICODONE) 10 MG immediate release tablet 3/19/2017 Active    pravastatin (PRAVACHOL) 20 MG Tab 3/19/2017 Active    predniSONE (DELTASONE) 5 MG Tab 3/19/2017 Active    sennosides (SENOKOT) 8.6 MG Tab 3/19/2017 Active    sennosides (SENOKOT) 8.6 MG Tab  Active    sertraline (ZOLOFT) 100 MG Tab 3/18/2017 Active    tacrolimus (PROGRAF) 0.5 MG Cap  Active    tacrolimus (PROGRAF) 0.5 MG Cap  Active    tacrolimus (PROGRAF) 1 MG Cap 3/19/2017 Active    Tadalafil, PAH, (ADCIRCA) 20 MG Tab 2/20/2017 Active    Tadalafil, PAH, (ADCIRCA) 20 MG Tab 3/18/2017 Active    tiotropium (SPIRIVA) 18 MCG Cap 3/19/2017 Active    trazodone (DESYREL) 50 MG Tab 3/18/2017 Active     "Wheat Dextrin (BENEFIBER) Powder 3/19/2017 Active                ALLERGIES  Allergies   Allergen Reactions   • Rhubarb Anaphylaxis   • Vancomycin Hcl      Causes red man syndrome when infused to fast         PHYSICAL EXAM  VITAL SIGNS: BP 98/53 mmHg  Pulse 60  Temp(Src) 36.8 °C (98.2 °F)  Resp 11  Ht 1.702 m (5' 7.01\")  Wt 79.379 kg (175 lb)  BMI 27.40 kg/m2  SpO2 95%  LMP 01/03/2000   Oxygen saturation is interpreted as adequate  Constitutional: Awake and verbal and talkative and she does not at all appear toxic or distressed  HENT: Mucous membranes are moist and throat clear  Eyes: No erythema or discharge or jaundice  Neck: Trachea midline no JVD  Cardiovascular: Regular rate and rhythm  Lungs: Clear and equal bilaterally with no apparent difficulty breathing  Abdomen/Back: Soft nontender nondistended no peritoneal findings bowel sounds are active. The patient declined a rectal examination today stating that she had a rectal examination yesterday and chart review shows that there was no stool found in the rectal vault at the time of exam  Skin: Warm and dry  Musculoskeletal: No acute bony deformity  Neurologic: Awake and verbal and moving all extremities  Psychiatric: The patient is extremely talkative and in constant motion jittering her lower extremities continuously during our conversation and she seems very anxious.     CHART REVIEW  I reviewed the ER chart from yesterday and the patient presented with abdominal pain nausea vomiting constipation for 2 months she had abdominal x-rays which are nondiagnostic and she had a soapsuds enema and according to the ER notes she did have a bowel movement. The patient was discharged with a prescription for GoLYTELY.    Laboratory  A CBC shows a normal blood cell count of 5.3 hemoglobin is adequate at 12.5, complete metabolic panel and lipase are unremarkable    Radiology  CT-ABDOMEN-PELVIS WITH   Final Result      1.  Increased fluid throughout the small bowel " and colon, likely gastroenteritis.   2.  No definite bowel obstruction or evidence for perforation.   3.  No secondary signs of acute appendicitis, however the appendix is not visualized.   4.  Probable splenic cyst, unchanged from prior exam.      DX-CHEST-PORTABLE (1 VIEW)   Final Result      1.  Small LEFT pleural fluid collection and minimal bibasilar atelectasis.   2.  No pneumonia or overt pulmonary edema.        MEDICAL DECISION MAKING and DISPOSITION  In the emergency department an IV was established and patient was given intravenous fluids as well as morphine and Haldol. In these measures were very helpful. The patient did have a bowel movement while in the emergency department today. I have reviewed all the findings in detail with her and at this point in time she does not appear to have a bowel obstruction and she has unremarkable laboratory testing and she is moving her bowels so I think he'll be safe for her to go home. I have recommended that she keep her gastroenterology appointment for colonoscopy and endoscopy on Monday and she may use MiraLAX if needed for constipation. The patient is to return here if she feels she is having new or worsening symptoms    IMPRESSION  1. Abdominal pain of unknown etiology  2. Constipation  3. Situational anxiety      Electronically signed by: Sudarshan Bowie, 3/20/2017 8:00 PM

## 2017-03-22 PROBLEM — F39 MOOD DISORDER (HCC): Status: ACTIVE | Noted: 2017-03-22

## 2017-03-22 PROBLEM — Z94.4 LIVER TRANSPLANTED (HCC): Status: ACTIVE | Noted: 2017-03-21

## 2017-03-22 PROBLEM — F22 DELUSIONAL DISORDER (HCC): Status: ACTIVE | Noted: 2017-03-22

## 2017-03-22 PROBLEM — I10 HTN (HYPERTENSION): Status: ACTIVE | Noted: 2017-03-22

## 2017-03-22 PROBLEM — F11.20 OPIOID DEPENDENCE (HCC): Status: ACTIVE | Noted: 2017-03-22

## 2017-03-22 PROBLEM — R51.9 HEADACHE: Status: ACTIVE | Noted: 2017-03-22

## 2017-03-22 LAB
ALBUMIN SERPL BCP-MCNC: 3.1 G/DL (ref 3.2–4.9)
ALBUMIN/GLOB SERPL: 1.4 G/DL
ALP SERPL-CCNC: 23 U/L (ref 30–99)
ALT SERPL-CCNC: 9 U/L (ref 2–50)
ANION GAP SERPL CALC-SCNC: 7 MMOL/L (ref 0–11.9)
AST SERPL-CCNC: 17 U/L (ref 12–45)
BASOPHILS # BLD AUTO: 0.5 % (ref 0–1.8)
BASOPHILS # BLD: 0.02 K/UL (ref 0–0.12)
BILIRUB SERPL-MCNC: 0.4 MG/DL (ref 0.1–1.5)
BUN SERPL-MCNC: 7 MG/DL (ref 8–22)
CALCIUM SERPL-MCNC: 7.9 MG/DL (ref 8.5–10.5)
CHLORIDE SERPL-SCNC: 109 MMOL/L (ref 96–112)
CO2 SERPL-SCNC: 25 MMOL/L (ref 20–33)
CREAT SERPL-MCNC: 0.76 MG/DL (ref 0.5–1.4)
EOSINOPHIL # BLD AUTO: 0.11 K/UL (ref 0–0.51)
EOSINOPHIL NFR BLD: 2.5 % (ref 0–6.9)
ERYTHROCYTE [DISTWIDTH] IN BLOOD BY AUTOMATED COUNT: 48.5 FL (ref 35.9–50)
GFR SERPL CREATININE-BSD FRML MDRD: >60 ML/MIN/1.73 M 2
GLOBULIN SER CALC-MCNC: 2.2 G/DL (ref 1.9–3.5)
GLUCOSE BLD-MCNC: 114 MG/DL (ref 65–99)
GLUCOSE BLD-MCNC: 127 MG/DL (ref 65–99)
GLUCOSE BLD-MCNC: 178 MG/DL (ref 65–99)
GLUCOSE BLD-MCNC: 179 MG/DL (ref 65–99)
GLUCOSE SERPL-MCNC: 98 MG/DL (ref 65–99)
HCT VFR BLD AUTO: 34.6 % (ref 37–47)
HGB BLD-MCNC: 10.7 G/DL (ref 12–16)
IMM GRANULOCYTES # BLD AUTO: 0.01 K/UL (ref 0–0.11)
IMM GRANULOCYTES NFR BLD AUTO: 0.2 % (ref 0–0.9)
LYMPHOCYTES # BLD AUTO: 0.85 K/UL (ref 1–4.8)
LYMPHOCYTES NFR BLD: 19.2 % (ref 22–41)
MCH RBC QN AUTO: 28.2 PG (ref 27–33)
MCHC RBC AUTO-ENTMCNC: 30.9 G/DL (ref 33.6–35)
MCV RBC AUTO: 91.1 FL (ref 81.4–97.8)
MONOCYTES # BLD AUTO: 0.31 K/UL (ref 0–0.85)
MONOCYTES NFR BLD AUTO: 7 % (ref 0–13.4)
NEUTROPHILS # BLD AUTO: 3.13 K/UL (ref 2–7.15)
NEUTROPHILS NFR BLD: 70.6 % (ref 44–72)
NRBC # BLD AUTO: 0 K/UL
NRBC BLD AUTO-RTO: 0 /100 WBC
PLATELET # BLD AUTO: 57 K/UL (ref 164–446)
PMV BLD AUTO: 9.5 FL (ref 9–12.9)
POTASSIUM SERPL-SCNC: 3.4 MMOL/L (ref 3.6–5.5)
PROT SERPL-MCNC: 5.3 G/DL (ref 6–8.2)
RBC # BLD AUTO: 3.8 M/UL (ref 4.2–5.4)
SODIUM SERPL-SCNC: 141 MMOL/L (ref 135–145)
WBC # BLD AUTO: 4.4 K/UL (ref 4.8–10.8)

## 2017-03-22 PROCEDURE — 700102 HCHG RX REV CODE 250 W/ 637 OVERRIDE(OP): Performed by: HOSPITALIST

## 2017-03-22 PROCEDURE — 700111 HCHG RX REV CODE 636 W/ 250 OVERRIDE (IP): Performed by: HOSPITALIST

## 2017-03-22 PROCEDURE — 80197 ASSAY OF TACROLIMUS: CPT

## 2017-03-22 PROCEDURE — 82962 GLUCOSE BLOOD TEST: CPT | Mod: 91

## 2017-03-22 PROCEDURE — 700102 HCHG RX REV CODE 250 W/ 637 OVERRIDE(OP): Performed by: INTERNAL MEDICINE

## 2017-03-22 PROCEDURE — A9270 NON-COVERED ITEM OR SERVICE: HCPCS | Performed by: INTERNAL MEDICINE

## 2017-03-22 PROCEDURE — 700101 HCHG RX REV CODE 250: Performed by: HOSPITALIST

## 2017-03-22 PROCEDURE — 700105 HCHG RX REV CODE 258: Performed by: HOSPITALIST

## 2017-03-22 PROCEDURE — 85025 COMPLETE CBC W/AUTO DIFF WBC: CPT

## 2017-03-22 PROCEDURE — 36415 COLL VENOUS BLD VENIPUNCTURE: CPT

## 2017-03-22 PROCEDURE — 700111 HCHG RX REV CODE 636 W/ 250 OVERRIDE (IP): Performed by: INTERNAL MEDICINE

## 2017-03-22 PROCEDURE — 99233 SBSQ HOSP IP/OBS HIGH 50: CPT | Performed by: INTERNAL MEDICINE

## 2017-03-22 PROCEDURE — 770020 HCHG ROOM/CARE - TELE (206)

## 2017-03-22 PROCEDURE — A9270 NON-COVERED ITEM OR SERVICE: HCPCS | Performed by: HOSPITALIST

## 2017-03-22 PROCEDURE — 80053 COMPREHEN METABOLIC PANEL: CPT

## 2017-03-22 RX ORDER — BUTALBITAL, ACETAMINOPHEN AND CAFFEINE 50; 325; 40 MG/1; MG/1; MG/1
2 TABLET ORAL EVERY 6 HOURS
Status: COMPLETED | OUTPATIENT
Start: 2017-03-22 | End: 2017-03-23

## 2017-03-22 RX ORDER — METOCLOPRAMIDE HYDROCHLORIDE 5 MG/ML
20 INJECTION INTRAMUSCULAR; INTRAVENOUS ONCE
Status: COMPLETED | OUTPATIENT
Start: 2017-03-22 | End: 2017-03-22

## 2017-03-22 RX ORDER — DEXAMETHASONE SODIUM PHOSPHATE 4 MG/ML
8 INJECTION, SOLUTION INTRA-ARTICULAR; INTRALESIONAL; INTRAMUSCULAR; INTRAVENOUS; SOFT TISSUE ONCE
Status: COMPLETED | OUTPATIENT
Start: 2017-03-22 | End: 2017-03-22

## 2017-03-22 RX ORDER — VALACYCLOVIR HYDROCHLORIDE 500 MG/1
500 TABLET, FILM COATED ORAL 2 TIMES DAILY
Status: DISCONTINUED | OUTPATIENT
Start: 2017-03-22 | End: 2017-03-27 | Stop reason: HOSPADM

## 2017-03-22 RX ADMIN — METRONIDAZOLE 500 MG: 500 INJECTION, SOLUTION INTRAVENOUS at 20:59

## 2017-03-22 RX ADMIN — METRONIDAZOLE 500 MG: 500 INJECTION, SOLUTION INTRAVENOUS at 05:13

## 2017-03-22 RX ADMIN — METOCLOPRAMIDE 20 MG: 5 INJECTION, SOLUTION INTRAMUSCULAR; INTRAVENOUS at 13:00

## 2017-03-22 RX ADMIN — DOXYCYCLINE 100 MG: 100 TABLET ORAL at 08:20

## 2017-03-22 RX ADMIN — SODIUM CHLORIDE: 9 INJECTION, SOLUTION INTRAVENOUS at 05:13

## 2017-03-22 RX ADMIN — ALPRAZOLAM 0.5 MG: 0.5 TABLET ORAL at 14:51

## 2017-03-22 RX ADMIN — HEPARIN SODIUM 5000 UNITS: 5000 INJECTION, SOLUTION INTRAVENOUS; SUBCUTANEOUS at 22:00

## 2017-03-22 RX ADMIN — DOXYCYCLINE 100 MG: 100 TABLET ORAL at 20:55

## 2017-03-22 RX ADMIN — INSULIN LISPRO 1 UNITS: 100 INJECTION, SOLUTION INTRAVENOUS; SUBCUTANEOUS at 17:32

## 2017-03-22 RX ADMIN — CEFTRIAXONE 2 G: 2 INJECTION, POWDER, FOR SOLUTION INTRAMUSCULAR; INTRAVENOUS at 08:23

## 2017-03-22 RX ADMIN — HEPARIN SODIUM 5000 UNITS: 5000 INJECTION, SOLUTION INTRAVENOUS; SUBCUTANEOUS at 05:11

## 2017-03-22 RX ADMIN — TACROLIMUS 1 MG: 1 CAPSULE ORAL at 21:04

## 2017-03-22 RX ADMIN — TACROLIMUS 2 MG: 1 CAPSULE ORAL at 08:28

## 2017-03-22 RX ADMIN — CYCLOBENZAPRINE HYDROCHLORIDE 10 MG: 10 TABLET, FILM COATED ORAL at 18:24

## 2017-03-22 RX ADMIN — HEPARIN SODIUM 5000 UNITS: 5000 INJECTION, SOLUTION INTRAVENOUS; SUBCUTANEOUS at 13:00

## 2017-03-22 RX ADMIN — DEXAMETHASONE SODIUM PHOSPHATE 8 MG: 4 INJECTION, SOLUTION INTRAMUSCULAR; INTRAVENOUS at 13:00

## 2017-03-22 RX ADMIN — PRAVASTATIN SODIUM 20 MG: 20 TABLET ORAL at 20:56

## 2017-03-22 RX ADMIN — OXYCODONE HYDROCHLORIDE 20 MG: 10 TABLET ORAL at 21:10

## 2017-03-22 RX ADMIN — FLUTICASONE PROPIONATE 50 MCG: 50 SPRAY, METERED NASAL at 09:00

## 2017-03-22 RX ADMIN — TIOTROPIUM BROMIDE 1 CAPSULE: 18 CAPSULE ORAL; RESPIRATORY (INHALATION) at 08:25

## 2017-03-22 RX ADMIN — METRONIDAZOLE 500 MG: 500 INJECTION, SOLUTION INTRAVENOUS at 12:18

## 2017-03-22 RX ADMIN — FERROUS SULFATE TAB 325 MG (65 MG ELEMENTAL FE) 325 MG: 325 (65 FE) TAB at 17:35

## 2017-03-22 RX ADMIN — TRAZODONE HYDROCHLORIDE 100 MG: 50 TABLET ORAL at 20:56

## 2017-03-22 RX ADMIN — AMBRISENTAN 10 MG: 10 TABLET, FILM COATED ORAL at 09:00

## 2017-03-22 RX ADMIN — MYCOPHENOLATE MOFETIL 500 MG: 250 CAPSULE ORAL at 21:04

## 2017-03-22 RX ADMIN — ALPRAZOLAM 0.5 MG: 0.5 TABLET ORAL at 08:20

## 2017-03-22 RX ADMIN — GABAPENTIN 600 MG: 300 CAPSULE ORAL at 08:20

## 2017-03-22 RX ADMIN — VALACYCLOVIR 500 MG: 500 TABLET, FILM COATED ORAL at 21:15

## 2017-03-22 RX ADMIN — MYCOPHENOLATE MOFETIL 500 MG: 250 CAPSULE ORAL at 08:24

## 2017-03-22 RX ADMIN — BUTALBITAL, ACETAMINOPHEN, AND CAFFEINE 2 TABLET: 50; 325; 40 TABLET ORAL at 23:24

## 2017-03-22 RX ADMIN — TADALAFIL 40 MG: 20 TABLET ORAL at 21:00

## 2017-03-22 RX ADMIN — OXYCODONE HYDROCHLORIDE 10 MG: 5 TABLET ORAL at 08:20

## 2017-03-22 RX ADMIN — FERROUS SULFATE TAB 325 MG (65 MG ELEMENTAL FE) 325 MG: 325 (65 FE) TAB at 06:18

## 2017-03-22 RX ADMIN — ALPRAZOLAM 0.5 MG: 0.5 TABLET ORAL at 20:55

## 2017-03-22 RX ADMIN — SERTRALINE 100 MG: 50 TABLET, FILM COATED ORAL at 20:55

## 2017-03-22 RX ADMIN — BUTALBITAL, ACETAMINOPHEN, AND CAFFEINE 2 TABLET: 50; 325; 40 TABLET ORAL at 17:35

## 2017-03-22 RX ADMIN — PREDNISONE 7 MG: 1 TABLET ORAL at 08:23

## 2017-03-22 RX ADMIN — GABAPENTIN 600 MG: 300 CAPSULE ORAL at 20:55

## 2017-03-22 RX ADMIN — GABAPENTIN 600 MG: 300 CAPSULE ORAL at 14:51

## 2017-03-22 RX ADMIN — OXYCODONE HYDROCHLORIDE 10 MG: 5 TABLET ORAL at 14:51

## 2017-03-22 RX ADMIN — ASPIRIN 81 MG: 81 TABLET ORAL at 08:20

## 2017-03-22 RX ADMIN — OMEPRAZOLE 40 MG: 20 CAPSULE, DELAYED RELEASE ORAL at 08:21

## 2017-03-22 RX ADMIN — LEVOTHYROXINE SODIUM 137 MCG: 137 TABLET ORAL at 07:23

## 2017-03-22 RX ADMIN — BUTALBITAL, ACETAMINOPHEN, AND CAFFEINE 2 TABLET: 50; 325; 40 TABLET ORAL at 13:42

## 2017-03-22 ASSESSMENT — ENCOUNTER SYMPTOMS
FEVER: 1
BACK PAIN: 1
NERVOUS/ANXIOUS: 1
SHORTNESS OF BREATH: 1
NAUSEA: 1
HEADACHES: 1
PHOTOPHOBIA: 0
ABDOMINAL PAIN: 1
CHILLS: 1
VOMITING: 1
WEAKNESS: 1

## 2017-03-22 ASSESSMENT — PAIN SCALES - GENERAL
PAINLEVEL_OUTOF10: 10
PAINLEVEL_OUTOF10: 10
PAINLEVEL_OUTOF10: 5
PAINLEVEL_OUTOF10: 3

## 2017-03-22 NOTE — PROGRESS NOTES
Bedside report completed. Assessment done. Patient voices concerns about her rings not fitting properly, tells this RN that she has gained 10# of fluid in the past week. Patient also c/o poor appetite for past 2 days and no BM for 5 days. Patient requests bladder scan, telling this RN that when she urinates, it is very minimal output. Denies n/v/d at this time. Lungs clear, diminished bibasilar. Patient c/o pain in Left lower abd when this RN auscultated bowel sounds. Bowel sounds present x 4 quads.PRN pain meds given. See EMAR. Abd soft. See RN flow sheet for full assessment. Call light in reach. Safety maintained.

## 2017-03-22 NOTE — PROGRESS NOTES
"Hospital Medicine Progress Note, Adult, Complex               Author: Rula Orr Date & Time created: 3/22/2017  9:16 AM     Interval History:  58 YO female with complicated PMH including Liver transplant (2011, on prograf, prednisone, and cellcept), Pulmonary HTN (on tadalafil, Letairis) COPD (on spiriva), Mood disorder (on Zoloft, xanax, trazodone), dyslipidemia (on pravachol) Chronic pain and opioid dependence (on Oxycodone, gabapentin, flexeril) presents with intractable headache, nausea and vomiting. CSF not indicative of meningitis. States HA worse and back pain worse since admission. States side of her head is swollen, states she has a bowel perforation, states she is jaundiced and that her hair is falling out. Wants Pain meds \"like they give in the ER\". Advised may have spinal HA, advised likely has UTI. Recent abdominal Xrays, CXR do not show perforation. Discussed extensively and seen w/ RN. ([facility] xplant team called but garbles the number, [phone] awaiting CB from on call practitioner.    Review of Systems:  Review of Systems   Constitutional: Positive for fever, chills and malaise/fatigue.   HENT:        States head swollen (its not)   Eyes: Negative for photophobia.        States eyes yellow (they're not)   Respiratory: Positive for shortness of breath (from vomiting and pain).    Cardiovascular: Negative for chest pain.   Gastrointestinal: Positive for nausea, vomiting and abdominal pain.        No BM or output after golytely   Genitourinary: Positive for dysuria.        \"Shingles on my vagina\"   Musculoskeletal: Positive for back pain (acutely worse on chronic post LP).   Neurological: Positive for weakness and headaches (11/10, was 8-9/10 on presentation).   Psychiatric/Behavioral: The patient is nervous/anxious.        Physical Exam:  Physical Exam   Constitutional: She is oriented to person, place, and time. She appears well-developed and well-nourished. No distress.   HENT: "   Head: Normocephalic and atraumatic.   Mouth/Throat: Oropharynx is clear and moist.   No swelling    Light brown, tan plaque on tongue   Eyes: EOM are normal. Pupils are equal, round, and reactive to light. Right eye exhibits no discharge. Left eye exhibits no discharge. No scleral icterus.   Neck: Neck supple.   Cardiovascular: Normal rate and regular rhythm.    Pulmonary/Chest: Effort normal and breath sounds normal.   Abdominal: Soft. She exhibits distension (mildly bloaty but not hard). She exhibits no mass. There is no tenderness (doesnt wince if distracted). There is no rebound and no guarding.   Decreased BS   Genitourinary:   2 eschars on labia per RN   Musculoskeletal: She exhibits no edema or tenderness.   Neurological: She is alert and oriented to person, place, and time. No cranial nerve deficit.   Skin: Skin is warm and dry. She is not diaphoretic.   Psychiatric:   VERY anxious   Nursing note and vitals reviewed.      Labs:        Invalid input(s): [id number]      Recent Labs      03/20/17   1709  03/21/17   1055  03/21/17   1803  03/22/17   0353   SODIUM  139  141   --   141   POTASSIUM  4.1  3.6   --   3.4*   CHLORIDE  104  105   --   109   CO2  30  30   --   25   BUN  11  9   --   7*   CREATININE  1.02  0.96   --   0.76   MAGNESIUM   --    --   1.8   --    CALCIUM  8.1*  8.9   --   7.9*     Recent Labs      03/19/17   1735  03/20/17   1709  03/21/17   1055  03/22/17   0353   ALTSGPT  20  13  13  9   ASTSGOT  25  14  16  17   ALKPHOSPHAT  29*  25*  28*  23*   TBILIRUBIN  0.5  0.3  0.4  0.4   LIPASE  22  10*   --    --    GLUCOSE  157*  108*  153*  98     Recent Labs      03/20/17   1330  03/21/17   1055  03/22/17   0353   RBC  4.37  4.00*  3.80*   HEMOGLOBIN  12.5  11.5*  10.7*   HEMATOCRIT  38.7  35.6*  34.6*   PLATELETCT  84*  66*  57*   PROTHROMBTM  12.4  12.9   --    APTT  27.2  25.9   --    INR  0.90  0.94   --      Recent Labs      03/20/17   1330  03/20/17   1709  03/21/17   1053   17   0353   WBC  5.3   --   6.5  4.4*   NEUTSPOLYS  78.10*   --   88.20*  70.60   LYMPHOCYTES  14.70*   --   5.40*  19.20*   MONOCYTES  4.50   --   5.20  7.00   EOSINOPHILS  2.10   --   0.60  2.50   BASOPHILS  0.20   --   0.30  0.50   ASTSGOT   --   14  16  17   ALTSGPT   --   13  13  9   ALKPHOSPHAT   --   25*  28*  23*   TBILIRUBIN   --   0.3  0.4  0.4           Hemodynamics:  Temp (24hrs), Av.2 °C (98.9 °F), Min:36.8 °C (98.3 °F), Max:37.5 °C (99.5 °F)  Temperature: 37.4 °C (99.3 °F)  Pulse  Av.7  Min: 68  Max: 101Heart Rate (Monitored): 72  Blood Pressure: 116/67 mmHg, NIBP: 130/65 mmHg     Respiratory:    Respiration: 20, Pulse Oximetry: 95 %, O2 Daily Delivery Respiratory : Silicone Nasal Cannula     Work Of Breathing / Effort: Moderate  RUL Breath Sounds: Clear, RML Breath Sounds: Clear;Diminished, RLL Breath Sounds: Diminished, MANJINDER Breath Sounds: Clear, LLL Breath Sounds: Diminished  Fluids:    Intake/Output Summary (Last 24 hours) at 17 0916  Last data filed at 17 2300   Gross per 24 hour   Intake    120 ml   Output      0 ml   Net    120 ml     Weight: 82.4 kg (181 lb 10.5 oz)  GI/Nutrition:  Orders Placed This Encounter   Procedures   • Diet Order     Standing Status: Standing      Number of Occurrences: 1      Standing Expiration Date:      Order Specific Question:  Diet:     Answer:  Cardiac [6]     Order Specific Question:  Diet:     Answer:  Diabetic [3]     Medical Decision Making, by Problem:  Active Hospital Problems    Diagnosis   • COPD (chronic obstructive pulmonary disease) (CMS-HCC) [J44.9] on spiriva   • CKD (chronic kidney disease) stage 3, GFR 30-59 ml/min- unclear cause, probably multifactorial [N18.3]   • MGUS (monoclonal gammopathy of unknown significance) [D47.2]   • Opioid dependence (CMS-HCC) [F11.20] suspect drug seeking- maintain baseline meds   • Mood disorder (CMS-HCC) [F39] on home meds, consider psych consult 2/2 severe anxiety and somatic delusions    • HTN (hypertension) [I10] @ goal   • Headache [R51] CSF and CT OK, ? Spinal component (reglan, fioricet, decadron)   • Type 2 diabetes mellitus (CMS-HCC) [E11.9] @ goal   • Sepsis (CMS-HCC) [A41.9] probably from UTI, other tests negative   • CAP (community acquired pneumonia) [J18.9]?? CXR looks OK Empirically covered   • Liver transplanted (CMS-HCC) [Z94.4] trying to get contact info   • Thrombocytopenia (CMS-HCC) [D69.6] chronic 2/2 liver diesase   • Immunocompromised state (CMS-HCC) [D84.9] treat genital eschars w/ valtrex, continue xplant meds   Pulmonary Hypertension- continue home meds  Abdominal issues- ? Hypomotility from Narcs.    I spent a total of 42 minutes during this clinical encounter of which > 50% was devoted to counseling and coordinating care including review of records, pertinent lab data and studies, as well as discussing diagnostic evaluation and work up, planned therapeutic interventions and future disposition of care. Where indicated, the assessment and plan reflect discussion of patient with consultants, other healthcare providers, family members, and additional research needed to obtain further information in formulating the plan of care of this patient.     Medications reviewed, Labs reviewed and Radiology images reviewed (gassy bowel loops w/o fluid levels)  Holbrook catheter: No Holbrook      DVT Prophylaxis: Heparin    Ulcer prophylaxis: Yes (chronic med)  Antibiotics: Treating active infection/contamination beyond 24 hours perioperative coverage  Assessed for rehab: Patient returned to prior level of function, rehabilitation not indicated at this time

## 2017-03-22 NOTE — H&P
CHIEF COMPLAINT:  Headache, dizziness and nausea over the past several days.    HISTORY OF PRESENT ILLNESS:  The patient is a pleasant 57-year-old female with   past medical history of liver transplant done about ____ years ago.    Currently on immunosuppressants essentially presented to the ER for evaluation   for headache, generalized dizziness, nausea, vomiting and neck stiffness.    Given that the patient is currently on immunosuppressants, an emergent lumbar   puncture was done, which fortunately does not show evidence of classical   meningitis.  Nevertheless, patient says that she is unable to keep any food or   liquids down.  She feels very constipated.  Last bowel movement was about 4-5   days ago despite taking GoLYTELY and has had enemas, has not had a successful   bowel movement.  She denies having any photophobia or worsening headache that   is exacerbated with light or sound.  At this time, patient will be admitted   for further supportive management.    REVIEW OF SYSTEMS:  Please see HPI, otherwise all systems are negative per   AMA/CMS criteria.    ALLERGIES:  PATIENT IS ALLERGIC TO RHUBARB AND VANCOMYCIN.    PAST MEDICAL HISTORY:  1.  History of chronic kidney disease, stage II.  2.  Depression.  3.  History of liver transplant in 2011.  4.  History of hypertension.  5.  Type 2 diabetes mellitus.  6.  Hypothyroidism.  7.  History of aortic stenosis.    FAMILY HISTORY:  Mother had lung cancer.  Father had diabetes and stroke.    SOCIAL HISTORY:  Patient denies using tobacco, alcohol, or illicit drugs.    HOME MEDICATIONS:  Include Xanax 0.5 mg 3 times a day, ambrisentan 10 mg   daily, aspirin 81 mg daily, Flexeril 10 mg 3 times a day as needed, ferrous   sulfate 325 mg twice a day, Flonase, gabapentin 600 mg 3 times a day,   Synthroid 137 mcg daily, CellCept 500 mg twice a day, omeprazole 40 mg daily,   oxycodone 30 mg q. 6 hours p.r.n., pravastatin 20 mg daily, prednisone 7 mg in   the morning,  Zoloft 100 mg at bedtime, Prograf 0.5-2 mg twice a day,   tadalafil 40 mg daily, Spiriva inhaler as needed ____ daily.    PHYSICAL EXAMINATION:  VITAL SIGNS:  Temperature 99.5, pulse 85, blood pressure 116/50, respirations   10, SpO2 95% on room air.  GENERAL:  The patient appears chronically ill, otherwise nontoxic.  HEENT:  Normocephalic, atraumatic.  Pupils equal and reactive to light,   nonicteric.  Mucous membranes moist.  NECK:  Supple, no thyromegaly, no JVD, no stridor.  CARDIOVASCULAR:  Normal rate and rhythm.  No gallops, rubs or murmurs   appreciated.  LUNGS:  Clear bilaterally.  No wheezes, rales or rhonchi.  ABDOMEN:  Soft, nontender, nondistended.  Positive bowel sounds.  No   hepatosplenomegaly or pulsatile masses noted.  EXTREMITIES:  Distal pulses intact, +2.  No cyanosis, clubbing or edema,  No   joint deformities.  Range of motion in all 4 extremities within normal limits.  NEUROLOGIC:  Alert and oriented x3.  Cranial nerves II-XII grossly intact.    PSYCHIATRIC:  Affect, judgment, mood is normal.    IMAGING:  CT of the head negative for any intracranial abnormalities or   paranasal sinus disease noted.  Chest x-ray, perihilar and bibasilar opacities   noted to be increased which represent atelectasis or pneumonitis, small left   pleural effusion.  Lactic acid 1.0.  WBC count 6.5, hemoglobin 11.5,   hematocrit 36.6, platelet count 66.  CMP essentially unremarkable.  Fluid   analysis total wbc's 2, total rbc's 9, lymphocytes 82.  Glucose CSF 75, total   protein 46.  Influenza screen negative for A and B.    ASSESSMENT AND PLAN:  1.  Sepsis, most likely due to early pneumonia as her chest x-ray shows   possible indication of pneumonitis.  Her urinalysis does show evidence of   moderate leukocyte esterase with 5-10 wbc's, but rather unimpressive.  Her   lumbar puncture is essentially negative for meningitis.  At this time, patient   will be placed on IV ceftriaxone and doxycycline.  Blood, sputum,  urine   cultures pending.  2.  History of liver transplant done in 2011.  Continue CellCept and Prograf.    We will go ahead and check a Prograf level to make sure that she is in a   therapeutic range and not in a toxic range.  3.  History of pulmonary hypertension.  Continue tadalafil.  4.  History of chronic kidney disease stage II, currently within normal   limits.  5.  Thrombocytopenia.  Patient's platelets are within her baseline level.    Continue to monitor daily CBCs for any acute changes.  6.  Gastroesophageal reflux disease.  Continue omeprazole.  7.  History of chronic obstructive pulmonary disease.  Continue Spiriva or RT   protocol and DuoNebs.  8.  Hypertension, controlled.  Continue ambrisentan.  We will hold off her   Lasix at this time.  9.  Hypothyroidism.  Continue Synthroid.  We will check a TSH and a free T4.    At this time, patient will be placed on heparin 5000 units t.i.d. for deep   venous thrombosis prophylaxis and docusate for gastrointestinal prophylaxis.  10.  Patient's code status is a full code.    I anticipate hospital length of stay to be greater than 2 midnights.       ____________________________________     MD BREE MITCHELL / SAVAGE    DD:  03/21/2017 17:57:04  DT:  03/21/2017 21:04:55    D#:  [id number]  Job#:  [id number]

## 2017-03-22 NOTE — PROGRESS NOTES
"Assessment completed.patient c/o severe headache.Tells this RN that \"it is not a headache, it's my frontal lobe.\"  MD aware. Call light in reach. Safety maintained.   "

## 2017-03-22 NOTE — DISCHARGE PLANNING
Attempted to visit with pt for screening, but sign outside door said to ask nurse before entering. Asked nurse if it was okay to go in pt room, and he stated he didn't know.

## 2017-03-22 NOTE — RESPIRATORY CARE
COPD EDUCATION by COPD CLINICAL EDUCATOR  3/22/2017 at 6:25 AM by Maci Velásquez     Patient reviewed by COPD education team. Patient does not qualify for COPD program.

## 2017-03-22 NOTE — PROGRESS NOTES
Patient states that her  recently had shingles. Patient tells this Rn that she has two spots on her vagina that appeared to be shingles to her. Two small healing scabs noted to left outside of labia majora. MD aware. Patient also tells this RN that Oxycodone is not touching her pain and that the staff needs to get ahead of it like they do in the ER. MD aware. Fely hospitalist RN called and made aware that, per patient, [facility] needs to be called at [phone] to know patient is in the hospital.

## 2017-03-22 NOTE — PROGRESS NOTES
Assessment completed. No acute changes observed. Patient remains free from injury. No C/O or concerns at this time. Call light in reach. Safety maintained.

## 2017-03-23 ENCOUNTER — APPOINTMENT (OUTPATIENT)
Dept: ADMISSIONS | Facility: MEDICAL CENTER | Age: 57
End: 2017-03-23
Payer: MEDICARE

## 2017-03-23 ENCOUNTER — APPOINTMENT (OUTPATIENT)
Dept: RADIOLOGY | Facility: MEDICAL CENTER | Age: 57
DRG: 392 | End: 2017-03-23
Attending: INTERNAL MEDICINE
Payer: MEDICARE

## 2017-03-23 PROBLEM — K59.00 OBSTIPATION: Status: ACTIVE | Noted: 2017-03-23

## 2017-03-23 PROBLEM — E87.6 HYPOKALEMIA: Status: ACTIVE | Noted: 2017-03-23

## 2017-03-23 LAB
BACTERIA UR CULT: NORMAL
GLUCOSE BLD-MCNC: 137 MG/DL (ref 65–99)
GLUCOSE BLD-MCNC: 151 MG/DL (ref 65–99)
GLUCOSE BLD-MCNC: 157 MG/DL (ref 65–99)
GLUCOSE BLD-MCNC: 192 MG/DL (ref 65–99)
SIGNIFICANT IND 70042: NORMAL
SITE SITE: NORMAL
SOURCE SOURCE: NORMAL

## 2017-03-23 PROCEDURE — A9270 NON-COVERED ITEM OR SERVICE: HCPCS | Performed by: HOSPITALIST

## 2017-03-23 PROCEDURE — 74000 DX-ABDOMEN-1 VIEW: CPT

## 2017-03-23 PROCEDURE — 700101 HCHG RX REV CODE 250: Performed by: HOSPITALIST

## 2017-03-23 PROCEDURE — 700111 HCHG RX REV CODE 636 W/ 250 OVERRIDE (IP): Performed by: HOSPITALIST

## 2017-03-23 PROCEDURE — A9270 NON-COVERED ITEM OR SERVICE: HCPCS | Performed by: INTERNAL MEDICINE

## 2017-03-23 PROCEDURE — 99232 SBSQ HOSP IP/OBS MODERATE 35: CPT | Performed by: INTERNAL MEDICINE

## 2017-03-23 PROCEDURE — 700111 HCHG RX REV CODE 636 W/ 250 OVERRIDE (IP): Performed by: INTERNAL MEDICINE

## 2017-03-23 PROCEDURE — 82962 GLUCOSE BLOOD TEST: CPT | Mod: 91

## 2017-03-23 PROCEDURE — 770020 HCHG ROOM/CARE - TELE (206)

## 2017-03-23 PROCEDURE — 700102 HCHG RX REV CODE 250 W/ 637 OVERRIDE(OP): Performed by: HOSPITALIST

## 2017-03-23 PROCEDURE — 700105 HCHG RX REV CODE 258: Performed by: HOSPITALIST

## 2017-03-23 PROCEDURE — 700101 HCHG RX REV CODE 250: Performed by: INTERNAL MEDICINE

## 2017-03-23 PROCEDURE — 700102 HCHG RX REV CODE 250 W/ 637 OVERRIDE(OP): Performed by: INTERNAL MEDICINE

## 2017-03-23 RX ORDER — METOCLOPRAMIDE HYDROCHLORIDE 5 MG/ML
10 INJECTION INTRAMUSCULAR; INTRAVENOUS EVERY 6 HOURS
Status: DISCONTINUED | OUTPATIENT
Start: 2017-03-23 | End: 2017-03-27 | Stop reason: HOSPADM

## 2017-03-23 RX ORDER — SODIUM CHLORIDE AND POTASSIUM CHLORIDE 150; 900 MG/100ML; MG/100ML
INJECTION, SOLUTION INTRAVENOUS CONTINUOUS
Status: DISCONTINUED | OUTPATIENT
Start: 2017-03-23 | End: 2017-03-25

## 2017-03-23 RX ADMIN — METRONIDAZOLE 500 MG: 500 INJECTION, SOLUTION INTRAVENOUS at 21:42

## 2017-03-23 RX ADMIN — TACROLIMUS 1 MG: 1 CAPSULE ORAL at 21:41

## 2017-03-23 RX ADMIN — INSULIN LISPRO 1 UNITS: 100 INJECTION, SOLUTION INTRAVENOUS; SUBCUTANEOUS at 11:21

## 2017-03-23 RX ADMIN — OXYCODONE HYDROCHLORIDE 30 MG: 30 TABLET ORAL at 21:50

## 2017-03-23 RX ADMIN — SERTRALINE 100 MG: 50 TABLET, FILM COATED ORAL at 21:50

## 2017-03-23 RX ADMIN — DOXYCYCLINE 100 MG: 100 TABLET ORAL at 21:40

## 2017-03-23 RX ADMIN — FLUTICASONE PROPIONATE 50 MCG: 50 SPRAY, METERED NASAL at 09:33

## 2017-03-23 RX ADMIN — METRONIDAZOLE 500 MG: 500 INJECTION, SOLUTION INTRAVENOUS at 06:12

## 2017-03-23 RX ADMIN — ONDANSETRON 4 MG: 2 INJECTION, SOLUTION INTRAMUSCULAR; INTRAVENOUS at 22:11

## 2017-03-23 RX ADMIN — ALPRAZOLAM 0.5 MG: 0.5 TABLET ORAL at 21:39

## 2017-03-23 RX ADMIN — TACROLIMUS 2 MG: 1 CAPSULE ORAL at 09:33

## 2017-03-23 RX ADMIN — POTASSIUM CHLORIDE AND SODIUM CHLORIDE: 900; 150 INJECTION, SOLUTION INTRAVENOUS at 10:48

## 2017-03-23 RX ADMIN — VALACYCLOVIR 500 MG: 500 TABLET, FILM COATED ORAL at 11:17

## 2017-03-23 RX ADMIN — CEFTRIAXONE 2 G: 2 INJECTION, POWDER, FOR SOLUTION INTRAMUSCULAR; INTRAVENOUS at 09:34

## 2017-03-23 RX ADMIN — HEPARIN SODIUM 5000 UNITS: 5000 INJECTION, SOLUTION INTRAVENOUS; SUBCUTANEOUS at 06:12

## 2017-03-23 RX ADMIN — METHYLNALTREXONE BROMIDE 12 MG: 12 INJECTION, SOLUTION SUBCUTANEOUS at 16:59

## 2017-03-23 RX ADMIN — PREDNISONE 7 MG: 1 TABLET ORAL at 09:32

## 2017-03-23 RX ADMIN — GABAPENTIN 600 MG: 300 CAPSULE ORAL at 21:40

## 2017-03-23 RX ADMIN — METOCLOPRAMIDE 10 MG: 5 INJECTION, SOLUTION INTRAMUSCULAR; INTRAVENOUS at 18:28

## 2017-03-23 RX ADMIN — METRONIDAZOLE 500 MG: 500 INJECTION, SOLUTION INTRAVENOUS at 13:10

## 2017-03-23 RX ADMIN — OMEPRAZOLE 40 MG: 20 CAPSULE, DELAYED RELEASE ORAL at 09:33

## 2017-03-23 RX ADMIN — LEVOTHYROXINE SODIUM 137 MCG: 137 TABLET ORAL at 07:56

## 2017-03-23 RX ADMIN — HEPARIN SODIUM 5000 UNITS: 5000 INJECTION, SOLUTION INTRAVENOUS; SUBCUTANEOUS at 14:49

## 2017-03-23 RX ADMIN — DOXYCYCLINE 100 MG: 100 TABLET ORAL at 09:32

## 2017-03-23 RX ADMIN — VALACYCLOVIR 500 MG: 500 TABLET, FILM COATED ORAL at 21:42

## 2017-03-23 RX ADMIN — FERROUS SULFATE TAB 325 MG (65 MG ELEMENTAL FE) 325 MG: 325 (65 FE) TAB at 06:13

## 2017-03-23 RX ADMIN — OXYCODONE HYDROCHLORIDE 30 MG: 30 TABLET ORAL at 13:10

## 2017-03-23 RX ADMIN — INSULIN LISPRO 1 UNITS: 100 INJECTION, SOLUTION INTRAVENOUS; SUBCUTANEOUS at 16:59

## 2017-03-23 RX ADMIN — MYCOPHENOLATE MOFETIL 500 MG: 250 CAPSULE ORAL at 09:33

## 2017-03-23 RX ADMIN — POTASSIUM CHLORIDE AND SODIUM CHLORIDE: 900; 150 INJECTION, SOLUTION INTRAVENOUS at 21:39

## 2017-03-23 RX ADMIN — INSULIN LISPRO 1 UNITS: 100 INJECTION, SOLUTION INTRAVENOUS; SUBCUTANEOUS at 22:09

## 2017-03-23 RX ADMIN — MYCOPHENOLATE MOFETIL 500 MG: 250 CAPSULE ORAL at 21:41

## 2017-03-23 RX ADMIN — OXYCODONE HYDROCHLORIDE 30 MG: 30 TABLET ORAL at 06:12

## 2017-03-23 RX ADMIN — PRAVASTATIN SODIUM 20 MG: 20 TABLET ORAL at 21:41

## 2017-03-23 RX ADMIN — BUTALBITAL, ACETAMINOPHEN, AND CAFFEINE 2 TABLET: 50; 325; 40 TABLET ORAL at 12:53

## 2017-03-23 RX ADMIN — GABAPENTIN 600 MG: 300 CAPSULE ORAL at 09:32

## 2017-03-23 RX ADMIN — AMBRISENTAN 10 MG: 10 TABLET, FILM COATED ORAL at 09:00

## 2017-03-23 RX ADMIN — TIOTROPIUM BROMIDE 1 CAPSULE: 18 CAPSULE ORAL; RESPIRATORY (INHALATION) at 09:32

## 2017-03-23 RX ADMIN — TADALAFIL 40 MG: 20 TABLET ORAL at 21:00

## 2017-03-23 RX ADMIN — ALPRAZOLAM 0.5 MG: 0.5 TABLET ORAL at 09:33

## 2017-03-23 RX ADMIN — HEPARIN SODIUM 5000 UNITS: 5000 INJECTION, SOLUTION INTRAVENOUS; SUBCUTANEOUS at 21:42

## 2017-03-23 RX ADMIN — GABAPENTIN 600 MG: 300 CAPSULE ORAL at 14:54

## 2017-03-23 RX ADMIN — ALPRAZOLAM 0.5 MG: 0.5 TABLET ORAL at 14:54

## 2017-03-23 RX ADMIN — BUTALBITAL, ACETAMINOPHEN, AND CAFFEINE 2 TABLET: 50; 325; 40 TABLET ORAL at 06:15

## 2017-03-23 RX ADMIN — BUTALBITAL, ACETAMINOPHEN, AND CAFFEINE 2 TABLET: 50; 325; 40 TABLET ORAL at 18:27

## 2017-03-23 RX ADMIN — TRAZODONE HYDROCHLORIDE 100 MG: 50 TABLET ORAL at 21:49

## 2017-03-23 ASSESSMENT — PAIN SCALES - GENERAL
PAINLEVEL_OUTOF10: 8
PAINLEVEL_OUTOF10: 3
PAINLEVEL_OUTOF10: 6
PAINLEVEL_OUTOF10: 3
PAINLEVEL_OUTOF10: 5
PAINLEVEL_OUTOF10: 8
PAINLEVEL_OUTOF10: 5

## 2017-03-23 ASSESSMENT — PATIENT HEALTH QUESTIONNAIRE - PHQ9
SUM OF ALL RESPONSES TO PHQ9 QUESTIONS 1 AND 2: 0
1. LITTLE INTEREST OR PLEASURE IN DOING THINGS: NOT AT ALL
SUM OF ALL RESPONSES TO PHQ QUESTIONS 1-9: 0

## 2017-03-23 ASSESSMENT — ENCOUNTER SYMPTOMS
HEADACHES: 0
SHORTNESS OF BREATH: 0
ABDOMINAL PAIN: 1
CONSTIPATION: 1
BACK PAIN: 1

## 2017-03-23 NOTE — PROGRESS NOTES
Patient is sitting up on the chair having lunch, no s/s of distress, abdominal xray ordered. Up to the bathroom with stand by assist. Pain is 8/10 see MAR. Reviewed plan of care, continue to monitor vitals and manage pain.

## 2017-03-23 NOTE — PROGRESS NOTES
Bedside report received. Patient A&O X 4, 4L NC, tele, NSR, stand by assit, voids, constipated. Encourage to eat 3 meals a day.  Complains of pain 3/10- chronic pain.  medicated per MAR. POC discussed with patient. Pt verbalized understanding. Call light and belongings with in reach.  Bed locked and in lowest position, alarm and fall precautions in place.

## 2017-03-23 NOTE — PROGRESS NOTES
"MD aware that patient is having back pain, despite 10mg of oxycodone. This RN informed patient that she may have a stronger dose of her PRN pain meds in 2.5 hours. Patient tells this RN \"who do I need to text to get even a small dose of something else.\" Patient tells this RN that she has a bowel obstruction. Bowel sounds present and normal in all 4 quads with auscultation. Abdomen is soft. No nonverbal signs of pain with palpation.   "

## 2017-03-23 NOTE — PROGRESS NOTES
Hospital Medicine Progress Note, Adult, Complex               Author: Rula Orr Date & Time created: 3/23/2017  3:41 PM     Interval History:  58 YO female with complicated PMH including Liver transplant (2011, on prograf, prednisone, and cellcept), Pulmonary HTN (on tadalafil, Letairis) COPD (on spiriva), Mood disorder (on Zoloft, xanax, trazodone), dyslipidemia (on pravachol) Chronic pain and opioid dependence (on Oxycodone, gabapentin, flexeril) presents with intractable headache, nausea and vomiting. CSF not indicative of meningitis. She was very anxious yesterday with c/o severe HA and myriad other issues. I notified her transplant center of her admit.    Today her HA and back pain are improved, controlled and she's like a different person. She is still concerned about her lack of bowel movement. She has endoscopies on Monday. KUB shows a solitary focus of stool in ? Splenic flexure. Will try IV Reglan and SQ Relistor. Advised clear liquids as she will probably not be succesful w/ Re- prep Sunday.        Review of Systems:  Review of Systems   Constitutional: Negative for malaise/fatigue.   Respiratory: Negative for shortness of breath.    Cardiovascular: Negative for chest pain.   Gastrointestinal: Positive for abdominal pain and constipation.   Musculoskeletal: Positive for back pain (improved).   Neurological: Negative for headaches.       Physical Exam:  Physical Exam   Constitutional: She is oriented to person, place, and time. She appears well-developed and well-nourished. No distress.   HENT:   Head: Normocephalic and atraumatic.   Eyes: EOM are normal. Pupils are equal, round, and reactive to light. Right eye exhibits no discharge. Left eye exhibits no discharge. No scleral icterus.   Neck: Neck supple.   Cardiovascular: Normal rate and regular rhythm.    Pulmonary/Chest: Effort normal and breath sounds normal.   Abdominal: Soft. Bowel sounds are normal. She exhibits no distension. There is  no tenderness.   Musculoskeletal: She exhibits no edema or tenderness.   Neurological: She is alert and oriented to person, place, and time. No cranial nerve deficit.   Skin: Skin is warm and dry. She is not diaphoretic.   Psychiatric: She has a normal mood and affect.   Nursing note and vitals reviewed.      Labs:        Invalid input(s): [id number]      Recent Labs      17   10517   1803  17   0353   SODIUM  139  141   --   141   POTASSIUM  4.1  3.6   --   3.4*   CHLORIDE  104  105   --   109   CO2  30  30   --   25   BUN  11  9   --   7*   CREATININE  1.02  0.96   --   0.76   MAGNESIUM   --    --   1.8   --    CALCIUM  8.1*  8.9   --   7.9*     Recent Labs      17   10517   0353   ALTSGPT  13  13  9   ASTSGOT  14  16  17   ALKPHOSPHAT  25*  28*  23*   TBILIRUBIN  0.3  0.4  0.4   LIPASE  10*   --    --    GLUCOSE  108*  153*  98     Recent Labs      17   10517   0353   RBC  4.00*  3.80*   HEMOGLOBIN  11.5*  10.7*   HEMATOCRIT  35.6*  34.6*   PLATELETCT  66*  57*   PROTHROMBTM  12.9   --    APTT  25.9   --    INR  0.94   --      Recent Labs      17   10517   0353   WBC   --   6.5  4.4*   NEUTSPOLYS   --   88.20*  70.60   LYMPHOCYTES   --   5.40*  19.20*   MONOCYTES   --   5.20  7.00   EOSINOPHILS   --   0.60  2.50   BASOPHILS   --   0.30  0.50   ASTSGOT  14  16  17   ALTSGPT  13  13  9   ALKPHOSPHAT  25*  28*  23*   TBILIRUBIN  0.3  0.4  0.4           Hemodynamics:  Temp (24hrs), Av °C (98.6 °F), Min:36.8 °C (98.3 °F), Max:37.3 °C (99.1 °F)  Temperature: 37.3 °C (99.1 °F)  Pulse  Av.7  Min: 68  Max: 101   Blood Pressure: 126/77 mmHg     Respiratory:    Respiration: 16, Pulse Oximetry: 97 %     Work Of Breathing / Effort: Mild  RUL Breath Sounds: Clear, RML Breath Sounds: Clear, RLL Breath Sounds: Diminished, MANJINDER Breath Sounds: Clear, LLL Breath Sounds:  Diminished  Fluids:    Intake/Output Summary (Last 24 hours) at 03/23/17 1541  Last data filed at 03/23/17 1300   Gross per 24 hour   Intake   2895 ml   Output   5000 ml   Net  -2105 ml     Weight: 82.2 kg (181 lb 3.5 oz)  GI/Nutrition:  Orders Placed This Encounter   Procedures   • Diet Order     Standing Status: Standing      Number of Occurrences: 1      Standing Expiration Date:      Order Specific Question:  Diet:     Answer:  Cardiac [6]     Order Specific Question:  Diet:     Answer:  Diabetic [3]     Medical Decision Making, by Problem:  Active Hospital Problems    Diagnosis   • COPD (chronic obstructive pulmonary disease) (CMS-HCC) [J44.9] stable, no exacerbation   • CKD (chronic kidney disease) stage 3, GFR 30-59 ml/min- unclear cause, probably multifactorial [N18.3]   • MGUS (monoclonal gammopathy of unknown significance) [D47.2]   • Hypokalemia [E87.6] in IVF, AM labs   • Obstipation [K59.00] see above   • Opioid dependence (CMS-[facility]) [F11.20] @ baseline dosing   • Mood disorder (CMS-[facility]) [F39] mood better   • HTN (hypertension) [I10] @ goal   • Headache [R51] resolved   • Delusional disorder (CMS-[facility]) [F22] resolved- ? Related to pain/ Anxiety   • Type 2 diabetes mellitus (CMS-HCC) [E11.9] near goal   • Sepsis (CMS-[facility]) [A41.9] resolved   • CAP (community acquired pneumonia) [J18.9] improved on empiric Rx   • Liver transplanted (CMS-[facility]) [Z94.4] stable, on meds   • Thrombocytopenia (CMS-[facility]) [D69.6] chronic,stable   • Immunocompromised state (CMS-[facility]) [D84.9]     Labs reviewed, Medications reviewed and Radiology images reviewed (see interval)  Holbrook catheter: No Holbrook      DVT Prophylaxis: Heparin    Ulcer prophylaxis: Yes (chronic med)  Antibiotics: Treating active infection/contamination beyond 24 hours perioperative coverage  Assessed for rehab: Patient returned to prior level of function, rehabilitation not indicated at this time

## 2017-03-23 NOTE — DISCHARGE PLANNING
"Care Transition Team Assessment    Information Source  Orientation : Oriented x 4  Information Given By: Patient  Informant's Name: Roxana  Who is responsible for making decisions for patient? : Patient    Readmission Evaluation  Is this a readmission?: Yes - unplanned readmission  Why do you think you were readmitted?: \"Bowel obstruction\"  Was an appointment arranged for you prior to discharge?: Yes, attended appointment  Were there new prescriptions you were supposed to fill after you were discharged?: Yes, prescriptions filled  Did you understand your discharge instructions?: Yes  Did you have enough support after your last discharge?: Yes    Elopement Risk  Legal Hold: No  Ambulatory or Self Mobile in Wheelchair: No-Not an Elopement Risk    Interdisciplinary Discharge Planning  Does Admitting Nurse Feel This Could be a Complex Discharge?: No  Primary Care Physician: Bernarda Reyes  Lives with - Patient's Self Care Capacity: Spouse  Patient or legal guardian wants to designate a caregiver (see row info): No  Support Systems: Spouse / Significant Other, Friends / Neighbors, Family Member(s)  Housing / Facility: 1 Story Apartment / Condo  Do You Take your Prescribed Medications Regularly: Yes  Able to Return to Previous ADL's: Yes  Mobility Issues: No  Prior Services: None  Patient Expects to be Discharged to:: Home  Assistance Needed: No  Durable Medical Equipment: Home Oxygen (%liter O2)  DME Provider / Phone: [facility]    Discharge Preparedness  What is your plan after discharge?: Home with help  What are your discharge supports?: Spouse, Child  Prior Functional Level: Ambulatory, Independent with Activities of Daily Living, Independent with Medication Management  Difficulity with ADLs: None  Difficulity with IADLs: None    Functional Assesment  Prior Functional Level: Ambulatory, Independent with Activities of Daily Living, Independent with Medication Management    Finances  Financial Barriers to Discharge: " Yes  Source of Income: Social Security, Pension  Prescription Coverage: Yes    Vision / Hearing Impairment  Vision Impairment : Yes  Right Eye Vision: Impaired  Left Eye Vision: Impaired  Hearing Impairment : No    Values / Beliefs / Concerns  Values / Beliefs Concerns : No    Advance Directive  Advance Directive?: Living Will    Domestic Abuse  Have you ever been the victim of abuse or violence?: No  Physical Abuse or Sexual Abuse: No  Verbal Abuse or Emotional Abuse: No  Possible Abuse Reported to:: Not Applicable    Psychological Assessment  History of Substance Abuse: None  History of Psychiatric Problems: No  Non-compliant with Treatment: No  Newly Diagnosed Illness: No    Discharge Risks or Barriers  Discharge risks or barriers?: Complex medical needs  Patient risk factors: Complex medical needs, Readmission    Anticipated Discharge Information  Anticipated discharge disposition: Home  Discharge Address: [address] [place] [address]  Discharge Contact Phone Number: [phone]

## 2017-03-24 LAB
ALBUMIN SERPL BCP-MCNC: 3.3 G/DL (ref 3.2–4.9)
ALBUMIN/GLOB SERPL: 1.8 G/DL
ALP SERPL-CCNC: 23 U/L (ref 30–99)
ALT SERPL-CCNC: 7 U/L (ref 2–50)
ANION GAP SERPL CALC-SCNC: 11 MMOL/L (ref 0–11.9)
AST SERPL-CCNC: 13 U/L (ref 12–45)
BACTERIA CSF CULT: NORMAL
BASOPHILS # BLD AUTO: 0.6 % (ref 0–1.8)
BASOPHILS # BLD: 0.02 K/UL (ref 0–0.12)
BILIRUB SERPL-MCNC: 0.3 MG/DL (ref 0.1–1.5)
BUN SERPL-MCNC: 11 MG/DL (ref 8–22)
CALCIUM SERPL-MCNC: 8 MG/DL (ref 8.5–10.5)
CHLORIDE SERPL-SCNC: 110 MMOL/L (ref 96–112)
CO2 SERPL-SCNC: 23 MMOL/L (ref 20–33)
CREAT SERPL-MCNC: 1.03 MG/DL (ref 0.5–1.4)
EKG IMPRESSION: NORMAL
EOSINOPHIL # BLD AUTO: 0.15 K/UL (ref 0–0.51)
EOSINOPHIL NFR BLD: 4.5 % (ref 0–6.9)
ERYTHROCYTE [DISTWIDTH] IN BLOOD BY AUTOMATED COUNT: 46.8 FL (ref 35.9–50)
GFR SERPL CREATININE-BSD FRML MDRD: 55 ML/MIN/1.73 M 2
GLOBULIN SER CALC-MCNC: 1.8 G/DL (ref 1.9–3.5)
GLUCOSE BLD-MCNC: 144 MG/DL (ref 65–99)
GLUCOSE BLD-MCNC: 89 MG/DL (ref 65–99)
GLUCOSE BLD-MCNC: 90 MG/DL (ref 65–99)
GLUCOSE BLD-MCNC: 95 MG/DL (ref 65–99)
GLUCOSE SERPL-MCNC: 84 MG/DL (ref 65–99)
GRAM STN SPEC: NORMAL
HCT VFR BLD AUTO: 32.8 % (ref 37–47)
HGB BLD-MCNC: 10.6 G/DL (ref 12–16)
IMM GRANULOCYTES # BLD AUTO: 0.02 K/UL (ref 0–0.11)
IMM GRANULOCYTES NFR BLD AUTO: 0.6 % (ref 0–0.9)
LYMPHOCYTES # BLD AUTO: 1.06 K/UL (ref 1–4.8)
LYMPHOCYTES NFR BLD: 31.9 % (ref 22–41)
MCH RBC QN AUTO: 28.6 PG (ref 27–33)
MCHC RBC AUTO-ENTMCNC: 32.3 G/DL (ref 33.6–35)
MCV RBC AUTO: 88.6 FL (ref 81.4–97.8)
MONOCYTES # BLD AUTO: 0.16 K/UL (ref 0–0.85)
MONOCYTES NFR BLD AUTO: 4.8 % (ref 0–13.4)
NEUTROPHILS # BLD AUTO: 1.91 K/UL (ref 2–7.15)
NEUTROPHILS NFR BLD: 57.6 % (ref 44–72)
NRBC # BLD AUTO: 0 K/UL
NRBC BLD AUTO-RTO: 0 /100 WBC
PLATELET # BLD AUTO: 66 K/UL (ref 164–446)
PMV BLD AUTO: 9.3 FL (ref 9–12.9)
POTASSIUM SERPL-SCNC: 3.5 MMOL/L (ref 3.6–5.5)
PROT SERPL-MCNC: 5.1 G/DL (ref 6–8.2)
RBC # BLD AUTO: 3.7 M/UL (ref 4.2–5.4)
SIGNIFICANT IND 70042: NORMAL
SITE SITE: NORMAL
SODIUM SERPL-SCNC: 144 MMOL/L (ref 135–145)
SOURCE SOURCE: NORMAL
TACROLIMUS BLD-MCNC: 3.9 NG/ML
WBC # BLD AUTO: 3.3 K/UL (ref 4.8–10.8)

## 2017-03-24 PROCEDURE — 700111 HCHG RX REV CODE 636 W/ 250 OVERRIDE (IP): Performed by: HOSPITALIST

## 2017-03-24 PROCEDURE — 700111 HCHG RX REV CODE 636 W/ 250 OVERRIDE (IP): Performed by: INTERNAL MEDICINE

## 2017-03-24 PROCEDURE — 770020 HCHG ROOM/CARE - TELE (206)

## 2017-03-24 PROCEDURE — A9270 NON-COVERED ITEM OR SERVICE: HCPCS | Performed by: HOSPITALIST

## 2017-03-24 PROCEDURE — 700102 HCHG RX REV CODE 250 W/ 637 OVERRIDE(OP): Performed by: INTERNAL MEDICINE

## 2017-03-24 PROCEDURE — 700101 HCHG RX REV CODE 250: Performed by: HOSPITALIST

## 2017-03-24 PROCEDURE — 700102 HCHG RX REV CODE 250 W/ 637 OVERRIDE(OP): Performed by: HOSPITALIST

## 2017-03-24 PROCEDURE — 80053 COMPREHEN METABOLIC PANEL: CPT

## 2017-03-24 PROCEDURE — 700101 HCHG RX REV CODE 250: Performed by: INTERNAL MEDICINE

## 2017-03-24 PROCEDURE — 36415 COLL VENOUS BLD VENIPUNCTURE: CPT

## 2017-03-24 PROCEDURE — 85025 COMPLETE CBC W/AUTO DIFF WBC: CPT

## 2017-03-24 PROCEDURE — A9270 NON-COVERED ITEM OR SERVICE: HCPCS | Performed by: INTERNAL MEDICINE

## 2017-03-24 PROCEDURE — 99232 SBSQ HOSP IP/OBS MODERATE 35: CPT | Performed by: INTERNAL MEDICINE

## 2017-03-24 PROCEDURE — 700105 HCHG RX REV CODE 258: Performed by: HOSPITALIST

## 2017-03-24 PROCEDURE — 82962 GLUCOSE BLOOD TEST: CPT

## 2017-03-24 RX ADMIN — OXYCODONE HYDROCHLORIDE 30 MG: 30 TABLET ORAL at 20:57

## 2017-03-24 RX ADMIN — DOXYCYCLINE 100 MG: 100 TABLET ORAL at 20:29

## 2017-03-24 RX ADMIN — CEFTRIAXONE 2 G: 2 INJECTION, POWDER, FOR SOLUTION INTRAMUSCULAR; INTRAVENOUS at 07:55

## 2017-03-24 RX ADMIN — METHYLNALTREXONE BROMIDE 12 MG: 12 INJECTION, SOLUTION SUBCUTANEOUS at 17:34

## 2017-03-24 RX ADMIN — TADALAFIL 40 MG: 20 TABLET ORAL at 21:00

## 2017-03-24 RX ADMIN — PRAVASTATIN SODIUM 20 MG: 20 TABLET ORAL at 20:29

## 2017-03-24 RX ADMIN — MYCOPHENOLATE MOFETIL 500 MG: 250 CAPSULE ORAL at 07:55

## 2017-03-24 RX ADMIN — OMEPRAZOLE 40 MG: 20 CAPSULE, DELAYED RELEASE ORAL at 07:55

## 2017-03-24 RX ADMIN — TRAZODONE HYDROCHLORIDE 50 MG: 50 TABLET ORAL at 20:34

## 2017-03-24 RX ADMIN — AMBRISENTAN 10 MG: 10 TABLET, FILM COATED ORAL at 09:00

## 2017-03-24 RX ADMIN — ALPRAZOLAM 0.5 MG: 0.5 TABLET ORAL at 14:20

## 2017-03-24 RX ADMIN — MYCOPHENOLATE MOFETIL 500 MG: 250 CAPSULE ORAL at 20:28

## 2017-03-24 RX ADMIN — POTASSIUM CHLORIDE AND SODIUM CHLORIDE: 900; 150 INJECTION, SOLUTION INTRAVENOUS at 17:29

## 2017-03-24 RX ADMIN — METRONIDAZOLE 500 MG: 500 INJECTION, SOLUTION INTRAVENOUS at 12:07

## 2017-03-24 RX ADMIN — METOCLOPRAMIDE 10 MG: 5 INJECTION, SOLUTION INTRAMUSCULAR; INTRAVENOUS at 06:00

## 2017-03-24 RX ADMIN — PREDNISONE 7 MG: 1 TABLET ORAL at 07:55

## 2017-03-24 RX ADMIN — GABAPENTIN 600 MG: 300 CAPSULE ORAL at 20:29

## 2017-03-24 RX ADMIN — ALPRAZOLAM 0.5 MG: 0.5 TABLET ORAL at 20:29

## 2017-03-24 RX ADMIN — POTASSIUM CHLORIDE AND SODIUM CHLORIDE: 900; 150 INJECTION, SOLUTION INTRAVENOUS at 06:45

## 2017-03-24 RX ADMIN — TIOTROPIUM BROMIDE 1 CAPSULE: 18 CAPSULE ORAL; RESPIRATORY (INHALATION) at 07:55

## 2017-03-24 RX ADMIN — METOCLOPRAMIDE 10 MG: 5 INJECTION, SOLUTION INTRAMUSCULAR; INTRAVENOUS at 17:30

## 2017-03-24 RX ADMIN — METOCLOPRAMIDE 10 MG: 5 INJECTION, SOLUTION INTRAMUSCULAR; INTRAVENOUS at 12:07

## 2017-03-24 RX ADMIN — TACROLIMUS 1 MG: 1 CAPSULE ORAL at 20:29

## 2017-03-24 RX ADMIN — HEPARIN SODIUM 5000 UNITS: 5000 INJECTION, SOLUTION INTRAVENOUS; SUBCUTANEOUS at 05:59

## 2017-03-24 RX ADMIN — TRAZODONE HYDROCHLORIDE 100 MG: 50 TABLET ORAL at 20:27

## 2017-03-24 RX ADMIN — VALACYCLOVIR 500 MG: 500 TABLET, FILM COATED ORAL at 20:26

## 2017-03-24 RX ADMIN — GABAPENTIN 600 MG: 300 CAPSULE ORAL at 14:20

## 2017-03-24 RX ADMIN — METRONIDAZOLE 500 MG: 500 INJECTION, SOLUTION INTRAVENOUS at 05:59

## 2017-03-24 RX ADMIN — GABAPENTIN 600 MG: 300 CAPSULE ORAL at 07:55

## 2017-03-24 RX ADMIN — HEPARIN SODIUM 5000 UNITS: 5000 INJECTION, SOLUTION INTRAVENOUS; SUBCUTANEOUS at 14:20

## 2017-03-24 RX ADMIN — SERTRALINE 100 MG: 50 TABLET, FILM COATED ORAL at 20:26

## 2017-03-24 RX ADMIN — METOCLOPRAMIDE 10 MG: 5 INJECTION, SOLUTION INTRAMUSCULAR; INTRAVENOUS at 00:27

## 2017-03-24 RX ADMIN — ALPRAZOLAM 0.5 MG: 0.5 TABLET ORAL at 07:54

## 2017-03-24 RX ADMIN — HEPARIN SODIUM 5000 UNITS: 5000 INJECTION, SOLUTION INTRAVENOUS; SUBCUTANEOUS at 20:30

## 2017-03-24 RX ADMIN — TACROLIMUS 2 MG: 1 CAPSULE ORAL at 07:55

## 2017-03-24 RX ADMIN — OXYCODONE HYDROCHLORIDE 30 MG: 30 TABLET ORAL at 14:20

## 2017-03-24 RX ADMIN — LEVOTHYROXINE SODIUM 137 MCG: 137 TABLET ORAL at 06:44

## 2017-03-24 RX ADMIN — DOXYCYCLINE 100 MG: 100 TABLET ORAL at 07:54

## 2017-03-24 RX ADMIN — FLUTICASONE PROPIONATE 50 MCG: 50 SPRAY, METERED NASAL at 07:54

## 2017-03-24 RX ADMIN — METRONIDAZOLE 500 MG: 500 INJECTION, SOLUTION INTRAVENOUS at 20:31

## 2017-03-24 RX ADMIN — OXYCODONE HYDROCHLORIDE 30 MG: 30 TABLET ORAL at 07:54

## 2017-03-24 RX ADMIN — VALACYCLOVIR 500 MG: 500 TABLET, FILM COATED ORAL at 07:55

## 2017-03-24 ASSESSMENT — ENCOUNTER SYMPTOMS
SHORTNESS OF BREATH: 0
ABDOMINAL PAIN: 1
CONSTIPATION: 1
HEADACHES: 0

## 2017-03-24 ASSESSMENT — PAIN SCALES - GENERAL
PAINLEVEL_OUTOF10: 5
PAINLEVEL_OUTOF10: 8
PAINLEVEL_OUTOF10: 9
PAINLEVEL_OUTOF10: 9
PAINLEVEL_OUTOF10: 8
PAINLEVEL_OUTOF10: 3
PAINLEVEL_OUTOF10: 4
PAINLEVEL_OUTOF10: 4

## 2017-03-24 NOTE — PROGRESS NOTES
"Hospital Medicine Progress Note, Adult, Complex               Author: Rula Orr Date & Time created: 3/24/2017  11:01 AM     Interval History:  58 YO female with complicated PMH including Liver transplant (2011, on prograf, prednisone, and cellcept), Pulmonary HTN (on tadalafil, Letairis) COPD (on spiriva), Mood disorder (on Zoloft, xanax, trazodone), dyslipidemia (on pravachol) Chronic pain and opioid dependence (on Oxycodone, gabapentin, flexeril) presents with intractable headache, nausea and vomiting. CSF not indicative of meningitis. She was very anxious yesterday with c/o severe HA and myriad other issues. I notified her transplant center of her admit.    3/23 Today her HA and back pain are improved, controlled and she's like a different person. She is still concerned about her lack of bowel movement. She has endoscopies on Monday. KUB shows a solitary focus of stool in ? Splenic flexure. Will try IV Reglan and SQ Relistor. Advised clear liquids as she will probably not be succesful w/ Re- prep Sunday.    3/24- only passed some sludge and gas \"a wet fart\" no significant BM, breathing is better. Discussed POC, repeating relistor.        Review of Systems:  Review of Systems   Constitutional: Negative for malaise/fatigue.   Respiratory: Negative for shortness of breath.    Cardiovascular: Negative for chest pain.   Gastrointestinal: Positive for abdominal pain (now on R) and constipation.   Neurological: Negative for headaches.       Physical Exam:  Physical Exam   Constitutional: She is oriented to person, place, and time. She appears well-developed and well-nourished. No distress.   HENT:   Head: Normocephalic and atraumatic.   Eyes: EOM are normal. Pupils are equal, round, and reactive to light. Right eye exhibits no discharge. Left eye exhibits no discharge. No scleral icterus.   Neck: Neck supple.   Cardiovascular: Normal rate and regular rhythm.    Pulmonary/Chest: Effort normal and breath " sounds normal.   Abdominal: Soft. Bowel sounds are normal. She exhibits no distension. There is no tenderness.   Left sided fullness   Musculoskeletal: She exhibits no edema or tenderness.   Neurological: She is alert and oriented to person, place, and time. No cranial nerve deficit.   Skin: Skin is warm and dry. She is not diaphoretic.   Psychiatric: She has a normal mood and affect.   Nursing note and vitals reviewed.      Labs:        Invalid input(s): [id number]      Recent Labs      17   1803  173  17   0302   SODIUM   --   141  144   POTASSIUM   --   3.4*  3.5*   CHLORIDE   --   109  110   CO2   --   25  23   BUN   --   7*  11   CREATININE   --   0.76  1.03   MAGNESIUM  1.8   --    --    CALCIUM   --   7.9*  8.0*     Recent Labs      17   0353  17   030   ALTSGPT  9  7   ASTSGOT  17  13   ALKPHOSPHAT  23*  23*   TBILIRUBIN  0.4  0.3   GLUCOSE  98  84     Recent Labs      17   0353  17   0302   RBC  3.80*  3.70*   HEMOGLOBIN  10.7*  10.6*   HEMATOCRIT  34.6*  32.8*   PLATELETCT  57*  66*     Recent Labs      173  17   0302   WBC  4.4*  3.3*   NEUTSPOLYS  70.60  57.60   LYMPHOCYTES  19.20*  31.90   MONOCYTES  7.00  4.80   EOSINOPHILS  2.50  4.50   BASOPHILS  0.50  0.60   ASTSGOT  17  13   ALTSGPT  9  7   ALKPHOSPHAT  23*  23*   TBILIRUBIN  0.4  0.3           Hemodynamics:  Temp (24hrs), Av.8 °C (98.3 °F), Min:36.6 °C (97.8 °F), Max:37.3 °C (99.1 °F)  Temperature: 36.6 °C (97.8 °F)  Pulse  Av.8  Min: 65  Max: 101   Blood Pressure: 124/70 mmHg     Respiratory:    Respiration: 18, Pulse Oximetry: 90 %     Work Of Breathing / Effort: Mild  RUL Breath Sounds: Clear, RML Breath Sounds: Clear, RLL Breath Sounds: Diminished, MANJINDER Breath Sounds: Clear, LLL Breath Sounds: Diminished  Fluids:    Intake/Output Summary (Last 24 hours) at 17 1101  Last data filed at 17 0000   Gross per 24 hour   Intake   1485 ml   Output   3250 ml   Net   -1765 ml     Weight: 80.8 kg (178 lb 2.1 oz)  GI/Nutrition:  Orders Placed This Encounter   Procedures   • Diet Order     Standing Status: Standing      Number of Occurrences: 1      Standing Expiration Date:      Order Specific Question:  Diet:     Answer:  Clear Liquids - No Red Foods [12]     Medical Decision Making, by Problem:  Active Hospital Problems    Diagnosis   • COPD (chronic obstructive pulmonary disease) (CMS-HCC) [J44.9] stable, no exacerbation   • CKD (chronic kidney disease) stage 3, GFR 30-59 ml/min- unclear cause, probably multifactorial [N18.3]   • MGUS (monoclonal gammopathy of unknown significance) [D47.2]   • Hypokalemia [E87.6] in IVF, AM labs   • Obstipation [K59.00] see above   • Opioid dependence (CMS-HCC) [F11.20] @ baseline dosing   • Mood disorder (CMS-HCC) [F39] mood better   • HTN (hypertension) [I10] @ goal   • Headache [R51] resolved   • Delusional disorder (CMS-HCC) [F22] resolved- ? Related to pain/ Anxiety   • Type 2 diabetes mellitus (CMS-HCC) [E11.9] near goal   • Sepsis (CMS-HCC) [A41.9] resolved   • CAP (community acquired pneumonia) [J18.9] improved on empiric Rx   • Liver transplanted (CMS-[facility]) [Z94.4] stable, on meds   • Thrombocytopenia (CMS-HCC) [D69.6] chronic,stable   • Immunocompromised state (CMS-HCC) [D84.9]     Labs reviewed, Medications reviewed and Radiology images reviewed (see interval)  Holbrook catheter: No Holbrook      DVT Prophylaxis: Heparin    Ulcer prophylaxis: Yes (chronic med)  Antibiotics: Treating active infection/contamination beyond 24 hours perioperative coverage  Assessed for rehab: Patient returned to prior level of function, rehabilitation not indicated at this time

## 2017-03-24 NOTE — CARE PLAN
Problem: Pain Management  Goal: Pain level will decrease to patient’s comfort goal  Pain is assessed throughout the shift and per unit protocol.. Pharmacological and non-pharmacological measures utilized to treat pain are offered to patientt. Pt is medicated per MAR.           Problem: Psychosocial Needs:  Goal: Level of anxiety will decrease  Anxiety triggers identified. Calming techniques provided to patient. Patient is involved in POC and is encouraged to verbalize questions and concerns.

## 2017-03-24 NOTE — CARE PLAN
Problem: Communication  Goal: The ability to communicate needs accurately and effectively will improve  Outcome: PROGRESSING AS EXPECTED  Discussed POC and medications with patient, pt verbalized understanding. Pt is very frustrated because she can not have a BM. Pt is appropriate, follows directions and knows her medications.

## 2017-03-24 NOTE — PROGRESS NOTES
Patient is AOX4, 4L NC, NSR 69 - monitor, up to the bathroom, IV fluids running. Chronic back pain -see MAR Reviewed plan of care, continue to monitor vitals and manage pain.

## 2017-03-24 NOTE — PROGRESS NOTES
Received bedside report from PM nurse. Assumed patient care. Chart reviewed. Pt was resting in bed. A&O x 4. No concerns, complaints or distress. Patient states 9/10 pain, meds given. POC updated with pt and on the patient communication board. Bed locked and in the lowest position. Call light within reach. Tele box on. Will continue to monitor.

## 2017-03-24 NOTE — PROGRESS NOTES
Patient refuses bed alarm. Education provided regarding increased fall risk, patient safety, and bed alarm rationale. Benefits & risks explained. Pt verbalizes understanding of education and continues to refuse bed alarm. Pt agrees to call for assistance when ambulating.

## 2017-03-24 NOTE — CARE PLAN
Problem: Pain Management  Goal: Pain level will decrease to patient’s comfort goal  Outcome: PROGRESSING AS EXPECTED  Pt has Chronic back pain, comfort level is 4/10, see MAR- Q6 meds. Continue to manage pain

## 2017-03-24 NOTE — PROGRESS NOTES
Assumed pt care at 1900.  Pt in no distress.  A&Ox4.  Has been constipated for few days, had small bowel movement at change of shift.  VSS.  SR on the monitor.  No further complaints.  Call light within reach.  Will continue to monitor and follow plan of care.

## 2017-03-24 NOTE — CARE PLAN
"Problem: Infection  Goal: Will remain free from infection  Intervention: Implement standard precautions and perform hand washing before and after patient contact  Patient and staff have been practicing hand hygiene, and using appropriate precautions.      Problem: Pain Management  Goal: Pain level will decrease to patient’s comfort goal  Outcome: PROGRESSING AS EXPECTED  Following pain medication pt states pain level decreased to \"4\" which was \"tolerable.\"          "

## 2017-03-25 LAB
GLUCOSE BLD-MCNC: 132 MG/DL (ref 65–99)
GLUCOSE BLD-MCNC: 73 MG/DL (ref 65–99)
GLUCOSE BLD-MCNC: 88 MG/DL (ref 65–99)
GLUCOSE BLD-MCNC: 92 MG/DL (ref 65–99)

## 2017-03-25 PROCEDURE — 700111 HCHG RX REV CODE 636 W/ 250 OVERRIDE (IP): Performed by: INTERNAL MEDICINE

## 2017-03-25 PROCEDURE — 700102 HCHG RX REV CODE 250 W/ 637 OVERRIDE(OP): Performed by: HOSPITALIST

## 2017-03-25 PROCEDURE — A9270 NON-COVERED ITEM OR SERVICE: HCPCS | Performed by: INTERNAL MEDICINE

## 2017-03-25 PROCEDURE — 82962 GLUCOSE BLOOD TEST: CPT

## 2017-03-25 PROCEDURE — 700105 HCHG RX REV CODE 258: Performed by: HOSPITALIST

## 2017-03-25 PROCEDURE — 700101 HCHG RX REV CODE 250: Performed by: INTERNAL MEDICINE

## 2017-03-25 PROCEDURE — 700111 HCHG RX REV CODE 636 W/ 250 OVERRIDE (IP): Performed by: HOSPITALIST

## 2017-03-25 PROCEDURE — 700102 HCHG RX REV CODE 250 W/ 637 OVERRIDE(OP): Performed by: INTERNAL MEDICINE

## 2017-03-25 PROCEDURE — 700101 HCHG RX REV CODE 250: Performed by: HOSPITALIST

## 2017-03-25 PROCEDURE — 770020 HCHG ROOM/CARE - TELE (206)

## 2017-03-25 PROCEDURE — 99232 SBSQ HOSP IP/OBS MODERATE 35: CPT | Performed by: INTERNAL MEDICINE

## 2017-03-25 PROCEDURE — A9270 NON-COVERED ITEM OR SERVICE: HCPCS | Performed by: HOSPITALIST

## 2017-03-25 RX ORDER — TACROLIMUS 1 MG/1
2 CAPSULE ORAL EVERY MORNING
Status: DISCONTINUED | OUTPATIENT
Start: 2017-03-26 | End: 2017-03-27 | Stop reason: HOSPADM

## 2017-03-25 RX ADMIN — GABAPENTIN 600 MG: 300 CAPSULE ORAL at 22:44

## 2017-03-25 RX ADMIN — CEFTRIAXONE 2 G: 2 INJECTION, POWDER, FOR SOLUTION INTRAMUSCULAR; INTRAVENOUS at 10:00

## 2017-03-25 RX ADMIN — SERTRALINE 100 MG: 50 TABLET, FILM COATED ORAL at 22:45

## 2017-03-25 RX ADMIN — OXYCODONE HYDROCHLORIDE 30 MG: 30 TABLET ORAL at 20:49

## 2017-03-25 RX ADMIN — PREDNISONE 7 MG: 1 TABLET ORAL at 09:57

## 2017-03-25 RX ADMIN — VALACYCLOVIR 500 MG: 500 TABLET, FILM COATED ORAL at 22:45

## 2017-03-25 RX ADMIN — METRONIDAZOLE 500 MG: 500 INJECTION, SOLUTION INTRAVENOUS at 06:08

## 2017-03-25 RX ADMIN — METOCLOPRAMIDE 10 MG: 5 INJECTION, SOLUTION INTRAMUSCULAR; INTRAVENOUS at 06:07

## 2017-03-25 RX ADMIN — MYCOPHENOLATE MOFETIL 500 MG: 250 CAPSULE ORAL at 22:46

## 2017-03-25 RX ADMIN — MYCOPHENOLATE MOFETIL 500 MG: 250 CAPSULE ORAL at 09:59

## 2017-03-25 RX ADMIN — TACROLIMUS 1.5 MG: 1 CAPSULE ORAL at 22:46

## 2017-03-25 RX ADMIN — TACROLIMUS 2 MG: 1 CAPSULE ORAL at 09:58

## 2017-03-25 RX ADMIN — GABAPENTIN 600 MG: 300 CAPSULE ORAL at 14:14

## 2017-03-25 RX ADMIN — LEVOTHYROXINE SODIUM 137 MCG: 137 TABLET ORAL at 06:20

## 2017-03-25 RX ADMIN — PROCHLORPERAZINE EDISYLATE 10 MG: 5 INJECTION INTRAMUSCULAR; INTRAVENOUS at 17:15

## 2017-03-25 RX ADMIN — TIOTROPIUM BROMIDE 1 CAPSULE: 18 CAPSULE ORAL; RESPIRATORY (INHALATION) at 09:59

## 2017-03-25 RX ADMIN — FLUTICASONE PROPIONATE 50 MCG: 50 SPRAY, METERED NASAL at 10:01

## 2017-03-25 RX ADMIN — VALACYCLOVIR 500 MG: 500 TABLET, FILM COATED ORAL at 09:59

## 2017-03-25 RX ADMIN — ONDANSETRON 4 MG: 4 TABLET, ORALLY DISINTEGRATING ORAL at 16:45

## 2017-03-25 RX ADMIN — HEPARIN SODIUM 5000 UNITS: 5000 INJECTION, SOLUTION INTRAVENOUS; SUBCUTANEOUS at 22:51

## 2017-03-25 RX ADMIN — METOCLOPRAMIDE 10 MG: 5 INJECTION, SOLUTION INTRAMUSCULAR; INTRAVENOUS at 02:15

## 2017-03-25 RX ADMIN — METOCLOPRAMIDE 10 MG: 5 INJECTION, SOLUTION INTRAMUSCULAR; INTRAVENOUS at 18:28

## 2017-03-25 RX ADMIN — HEPARIN SODIUM 5000 UNITS: 5000 INJECTION, SOLUTION INTRAVENOUS; SUBCUTANEOUS at 14:14

## 2017-03-25 RX ADMIN — OMEPRAZOLE 40 MG: 20 CAPSULE, DELAYED RELEASE ORAL at 09:58

## 2017-03-25 RX ADMIN — POTASSIUM CHLORIDE AND SODIUM CHLORIDE 1000 ML: 900; 150 INJECTION, SOLUTION INTRAVENOUS at 06:09

## 2017-03-25 RX ADMIN — DOXYCYCLINE 100 MG: 100 TABLET ORAL at 09:58

## 2017-03-25 RX ADMIN — OXYCODONE HYDROCHLORIDE 30 MG: 30 TABLET ORAL at 14:14

## 2017-03-25 RX ADMIN — PRAVASTATIN SODIUM 20 MG: 20 TABLET ORAL at 22:45

## 2017-03-25 RX ADMIN — AMBRISENTAN 10 MG: 10 TABLET, FILM COATED ORAL at 09:00

## 2017-03-25 RX ADMIN — TRAZODONE HYDROCHLORIDE 100 MG: 50 TABLET ORAL at 22:45

## 2017-03-25 RX ADMIN — ALPRAZOLAM 0.5 MG: 0.5 TABLET ORAL at 14:14

## 2017-03-25 RX ADMIN — CYCLOBENZAPRINE HYDROCHLORIDE 10 MG: 10 TABLET, FILM COATED ORAL at 01:04

## 2017-03-25 RX ADMIN — OXYCODONE HYDROCHLORIDE 30 MG: 30 TABLET ORAL at 08:11

## 2017-03-25 RX ADMIN — CYCLOBENZAPRINE HYDROCHLORIDE 10 MG: 10 TABLET, FILM COATED ORAL at 18:28

## 2017-03-25 RX ADMIN — POLYETHYLENE GLYCOL 3350, SODIUM SULFATE ANHYDROUS, SODIUM BICARBONATE, SODIUM CHLORIDE, POTASSIUM CHLORIDE 4 L: 236; 22.74; 6.74; 5.86; 2.97 POWDER, FOR SOLUTION ORAL at 14:16

## 2017-03-25 RX ADMIN — TADALAFIL 40 MG: 20 TABLET ORAL at 22:47

## 2017-03-25 RX ADMIN — ALPRAZOLAM 0.5 MG: 0.5 TABLET ORAL at 20:49

## 2017-03-25 RX ADMIN — GABAPENTIN 600 MG: 300 CAPSULE ORAL at 09:57

## 2017-03-25 RX ADMIN — HEPARIN SODIUM 5000 UNITS: 5000 INJECTION, SOLUTION INTRAVENOUS; SUBCUTANEOUS at 06:08

## 2017-03-25 RX ADMIN — ALPRAZOLAM 0.5 MG: 0.5 TABLET ORAL at 08:16

## 2017-03-25 ASSESSMENT — PAIN SCALES - GENERAL
PAINLEVEL_OUTOF10: 9
PAINLEVEL_OUTOF10: 9
PAINLEVEL_OUTOF10: 7
PAINLEVEL_OUTOF10: 9
PAINLEVEL_OUTOF10: 9
PAINLEVEL_OUTOF10: 6
PAINLEVEL_OUTOF10: 9
PAINLEVEL_OUTOF10: 7

## 2017-03-25 ASSESSMENT — ENCOUNTER SYMPTOMS
ABDOMINAL PAIN: 1
HEADACHES: 0
SHORTNESS OF BREATH: 0
CONSTIPATION: 1

## 2017-03-25 NOTE — PROGRESS NOTES
Received report from NOC RN. Assumed pt care. Assessment completed. AA&Ox4. Pain 9/10. O2 97% on 3L NC.   Pt is currently in bed. Denies SOB, chest pain, dizziness.  Bed in lowest position, bed locked, RN and CNA numbers provided, no further needs at this time. Safety precautions in place. Hourly rounding in progress.

## 2017-03-25 NOTE — CARE PLAN
Problem: Infection  Goal: Will remain free from infection  Intervention: Assess signs and symptoms of infection  NO SIGNS OR SYMPTOMS.

## 2017-03-25 NOTE — CARE PLAN
Problem: Communication  Goal: The ability to communicate needs accurately and effectively will improve  Intervention: Steele patient and significant other/support system to call light to alert staff of needs    03/25/17 0023   OTHER   Oriented to: All of the Following : Location of Bathroom, Visiting Policy, Unit Routine, Call Light and Bedside Controls, Bedside Rail Policy, Smoking Policy, Rights and Responsibilities, Bedside Report, and Patient Education Notebook

## 2017-03-25 NOTE — PROGRESS NOTES
"Hospital Medicine Progress Note, Adult, Complex               Author: Rula Orr Date & Time created: 3/25/2017  3:36 PM     Interval History:  58 YO female with complicated PMH including Liver transplant (2011, on prograf, prednisone, and cellcept), Pulmonary HTN (on tadalafil, Letairis) COPD (on spiriva), Mood disorder (on Zoloft, xanax, trazodone), dyslipidemia (on pravachol) Chronic pain and opioid dependence (on Oxycodone, gabapentin, flexeril) presents with intractable headache, nausea and vomiting. CSF not indicative of meningitis. She was very anxious yesterday with c/o severe HA and myriad other issues. I notified her transplant center of her admit.    3/23 Today her HA and back pain are improved, controlled and she's like a different person. She is still concerned about her lack of bowel movement. She has endoscopies on Monday. KUB shows a solitary focus of stool in ? Splenic flexure. Will try IV Reglan and SQ Relistor. Advised clear liquids as she will probably not be succesful w/ Re- prep Sunday.    3/24- only passed some sludge and gas \"a wet fart\" no significant BM, breathing is better. Discussed POC, repeating relistor.    3/25 still no BM, abdomen softer. Reviewed images of CT, KUB with RNs. Will continue Reglan.        Review of Systems:  Review of Systems   Constitutional: Negative for malaise/fatigue.   Respiratory: Negative for shortness of breath.    Cardiovascular: Negative for chest pain.   Gastrointestinal: Positive for abdominal pain (still c/o vague disocmfort) and constipation.   Neurological: Negative for headaches.       Physical Exam:  Physical Exam   Constitutional: She is oriented to person, place, and time. She appears well-developed and well-nourished. No distress.   HENT:   Head: Normocephalic and atraumatic.   Eyes: EOM are normal. Pupils are equal, round, and reactive to light. Right eye exhibits no discharge. Left eye exhibits no discharge. No scleral icterus.   Neck: " Neck supple.   Cardiovascular: Normal rate and regular rhythm.    Pulmonary/Chest: Effort normal and breath sounds normal.   Abdominal: Soft. Bowel sounds are normal. She exhibits no distension. There is no tenderness.   Left sided fullness   Musculoskeletal: She exhibits no edema or tenderness.   Neurological: She is alert and oriented to person, place, and time. No cranial nerve deficit.   Skin: Skin is warm and dry. She is not diaphoretic.   Psychiatric: She has a normal mood and affect.   Nursing note and vitals reviewed.      Labs:        Invalid input(s): [id number]      Recent Labs      17   0302   SODIUM  144   POTASSIUM  3.5*   CHLORIDE  110   CO2  23   BUN  11   CREATININE  1.03   CALCIUM  8.0*     Recent Labs      17   0302   ALTSGPT  7   ASTSGOT  13   ALKPHOSPHAT  23*   TBILIRUBIN  0.3   GLUCOSE  84     Recent Labs      17   0302   RBC  3.70*   HEMOGLOBIN  10.6*   HEMATOCRIT  32.8*   PLATELETCT  66*     Recent Labs      17   0302   WBC  3.3*   NEUTSPOLYS  57.60   LYMPHOCYTES  31.90   MONOCYTES  4.80   EOSINOPHILS  4.50   BASOPHILS  0.60   ASTSGOT  13   ALTSGPT  7   ALKPHOSPHAT  23*   TBILIRUBIN  0.3           Hemodynamics:  Temp (24hrs), Av.8 °C (98.2 °F), Min:36.5 °C (97.7 °F), Max:37.2 °C (98.9 °F)  Temperature: 36.7 °C (98 °F)  Pulse  Av.8  Min: 51  Max: 101   Blood Pressure: 144/75 mmHg     Respiratory:    Respiration: 20, Pulse Oximetry: 97 %     Work Of Breathing / Effort: Mild  RUL Breath Sounds: Diminished, RML Breath Sounds: Diminished, RLL Breath Sounds: Diminished, MANJINDER Breath Sounds: Diminished, LLL Breath Sounds: Diminished  Fluids:    Intake/Output Summary (Last 24 hours) at 17 1536  Last data filed at 17 1338   Gross per 24 hour   Intake   1275 ml   Output   3550 ml   Net  -2275 ml     Weight: 78 kg (171 lb 15.3 oz)  GI/Nutrition:  Orders Placed This Encounter   Procedures   • Diet Order     Standing Status: Standing      Number of  Occurrences: 1      Standing Expiration Date:      Order Specific Question:  Diet:     Answer:  Clear Liquids - No Red Foods [12]     Order Specific Question:  Diet:     Answer:  Diabetic [3]     Order Specific Question:  Diet:     Answer:  Cardiac [6]     Medical Decision Making, by Problem:  Active Hospital Problems    Diagnosis   • COPD (chronic obstructive pulmonary disease) (CMS-HCC) [J44.9] stable, no exacerbation   • CKD (chronic kidney disease) stage 3, GFR 30-59 ml/min- unclear cause, probably multifactorial [N18.3]   • MGUS (monoclonal gammopathy of unknown significance) [D47.2]   • Hypokalemia [E87.6] in IVF, AM labs   • Obstipation [K59.00] see above   • Opioid dependence (CMS-HCC) [F11.20] @ baseline dosing   • Mood disorder (CMS-HCC) [F39] mood better   • HTN (hypertension) [I10] @ goal   • Headache [R51] resolved   • Delusional disorder (CMS-HCC) [F22] resolved- ? Related to pain/ Anxiety   • Type 2 diabetes mellitus (CMS-HCC) [E11.9] near goal   • Sepsis (CMS-HCC) [A41.9] reviewed VS etc on admission- I DO NOT THINK SHE HAD SEPSIS OR SIRS   • CAP (community acquired pneumonia) [J18.9] I reviewed CT done on admission I do not see signifcant infiltrates RULED OUT   • Liver transplanted (CMS-HCC) [Z94.4] stable, on meds   • Thrombocytopenia (CMS-HCC) [D69.6] chronic,stable   • Immunocompromised state (CMS-HCC) [D84.9]     Labs reviewed, Medications reviewed and Radiology images reviewed (see interval)  Holbrook catheter: No Holbrook      DVT Prophylaxis: Heparin    Ulcer prophylaxis: Yes (chronic med)  Antibiotics: Treating active infection/contamination beyond 24 hours perioperative coverage  Assessed for rehab: Patient returned to prior level of function, rehabilitation not indicated at this time

## 2017-03-26 ENCOUNTER — APPOINTMENT (OUTPATIENT)
Dept: RADIOLOGY | Facility: MEDICAL CENTER | Age: 57
DRG: 392 | End: 2017-03-26
Attending: INTERNAL MEDICINE
Payer: MEDICARE

## 2017-03-26 LAB
ALBUMIN SERPL BCP-MCNC: 3.3 G/DL (ref 3.2–4.9)
ALBUMIN/GLOB SERPL: 1.8 G/DL
ALP SERPL-CCNC: 21 U/L (ref 30–99)
ALT SERPL-CCNC: 14 U/L (ref 2–50)
ANION GAP SERPL CALC-SCNC: 10 MMOL/L (ref 0–11.9)
AST SERPL-CCNC: 20 U/L (ref 12–45)
BACTERIA BLD CULT: NORMAL
BACTERIA BLD CULT: NORMAL
BILIRUB SERPL-MCNC: 0.3 MG/DL (ref 0.1–1.5)
BUN SERPL-MCNC: 6 MG/DL (ref 8–22)
CALCIUM SERPL-MCNC: 8.8 MG/DL (ref 8.5–10.5)
CHLORIDE SERPL-SCNC: 108 MMOL/L (ref 96–112)
CO2 SERPL-SCNC: 25 MMOL/L (ref 20–33)
CREAT SERPL-MCNC: 0.93 MG/DL (ref 0.5–1.4)
GFR SERPL CREATININE-BSD FRML MDRD: >60 ML/MIN/1.73 M 2
GLOBULIN SER CALC-MCNC: 1.8 G/DL (ref 1.9–3.5)
GLUCOSE BLD-MCNC: 110 MG/DL (ref 65–99)
GLUCOSE BLD-MCNC: 76 MG/DL (ref 65–99)
GLUCOSE BLD-MCNC: 77 MG/DL (ref 65–99)
GLUCOSE BLD-MCNC: 80 MG/DL (ref 65–99)
GLUCOSE BLD-MCNC: 85 MG/DL (ref 65–99)
GLUCOSE SERPL-MCNC: 75 MG/DL (ref 65–99)
POTASSIUM SERPL-SCNC: 3.7 MMOL/L (ref 3.6–5.5)
PROT SERPL-MCNC: 5.1 G/DL (ref 6–8.2)
SIGNIFICANT IND 70042: NORMAL
SIGNIFICANT IND 70042: NORMAL
SITE SITE: NORMAL
SITE SITE: NORMAL
SODIUM SERPL-SCNC: 143 MMOL/L (ref 135–145)
SOURCE SOURCE: NORMAL
SOURCE SOURCE: NORMAL

## 2017-03-26 PROCEDURE — 700102 HCHG RX REV CODE 250 W/ 637 OVERRIDE(OP): Performed by: HOSPITALIST

## 2017-03-26 PROCEDURE — 74176 CT ABD & PELVIS W/O CONTRAST: CPT

## 2017-03-26 PROCEDURE — A9270 NON-COVERED ITEM OR SERVICE: HCPCS | Performed by: HOSPITALIST

## 2017-03-26 PROCEDURE — 82962 GLUCOSE BLOOD TEST: CPT

## 2017-03-26 PROCEDURE — 700111 HCHG RX REV CODE 636 W/ 250 OVERRIDE (IP): Performed by: HOSPITALIST

## 2017-03-26 PROCEDURE — 770020 HCHG ROOM/CARE - TELE (206)

## 2017-03-26 PROCEDURE — 36415 COLL VENOUS BLD VENIPUNCTURE: CPT

## 2017-03-26 PROCEDURE — A9270 NON-COVERED ITEM OR SERVICE: HCPCS | Performed by: INTERNAL MEDICINE

## 2017-03-26 PROCEDURE — 700102 HCHG RX REV CODE 250 W/ 637 OVERRIDE(OP): Performed by: INTERNAL MEDICINE

## 2017-03-26 PROCEDURE — 700101 HCHG RX REV CODE 250: Performed by: INTERNAL MEDICINE

## 2017-03-26 PROCEDURE — 700111 HCHG RX REV CODE 636 W/ 250 OVERRIDE (IP): Performed by: INTERNAL MEDICINE

## 2017-03-26 PROCEDURE — 99232 SBSQ HOSP IP/OBS MODERATE 35: CPT | Performed by: INTERNAL MEDICINE

## 2017-03-26 PROCEDURE — 80053 COMPREHEN METABOLIC PANEL: CPT

## 2017-03-26 RX ADMIN — ALPRAZOLAM 0.5 MG: 0.5 TABLET ORAL at 14:42

## 2017-03-26 RX ADMIN — HEPARIN SODIUM 5000 UNITS: 5000 INJECTION, SOLUTION INTRAVENOUS; SUBCUTANEOUS at 21:32

## 2017-03-26 RX ADMIN — METOCLOPRAMIDE 10 MG: 5 INJECTION, SOLUTION INTRAMUSCULAR; INTRAVENOUS at 13:31

## 2017-03-26 RX ADMIN — VALACYCLOVIR 500 MG: 500 TABLET, FILM COATED ORAL at 21:27

## 2017-03-26 RX ADMIN — METOCLOPRAMIDE 10 MG: 5 INJECTION, SOLUTION INTRAMUSCULAR; INTRAVENOUS at 00:39

## 2017-03-26 RX ADMIN — TADALAFIL 40 MG: 20 TABLET ORAL at 21:00

## 2017-03-26 RX ADMIN — CYCLOBENZAPRINE HYDROCHLORIDE 10 MG: 10 TABLET, FILM COATED ORAL at 18:01

## 2017-03-26 RX ADMIN — HEPARIN SODIUM 5000 UNITS: 5000 INJECTION, SOLUTION INTRAVENOUS; SUBCUTANEOUS at 06:31

## 2017-03-26 RX ADMIN — PROCHLORPERAZINE EDISYLATE 10 MG: 5 INJECTION INTRAMUSCULAR; INTRAVENOUS at 08:23

## 2017-03-26 RX ADMIN — OMEPRAZOLE 40 MG: 20 CAPSULE, DELAYED RELEASE ORAL at 08:19

## 2017-03-26 RX ADMIN — METOCLOPRAMIDE 10 MG: 5 INJECTION, SOLUTION INTRAMUSCULAR; INTRAVENOUS at 23:09

## 2017-03-26 RX ADMIN — OXYCODONE HYDROCHLORIDE 30 MG: 30 TABLET ORAL at 23:09

## 2017-03-26 RX ADMIN — OXYCODONE HYDROCHLORIDE 30 MG: 30 TABLET ORAL at 16:18

## 2017-03-26 RX ADMIN — GABAPENTIN 600 MG: 300 CAPSULE ORAL at 08:20

## 2017-03-26 RX ADMIN — METOCLOPRAMIDE 10 MG: 5 INJECTION, SOLUTION INTRAMUSCULAR; INTRAVENOUS at 06:29

## 2017-03-26 RX ADMIN — TIOTROPIUM BROMIDE 1 CAPSULE: 18 CAPSULE ORAL; RESPIRATORY (INHALATION) at 08:23

## 2017-03-26 RX ADMIN — GABAPENTIN 600 MG: 300 CAPSULE ORAL at 21:24

## 2017-03-26 RX ADMIN — GABAPENTIN 600 MG: 300 CAPSULE ORAL at 14:42

## 2017-03-26 RX ADMIN — SERTRALINE 100 MG: 50 TABLET, FILM COATED ORAL at 21:23

## 2017-03-26 RX ADMIN — ALPRAZOLAM 0.5 MG: 0.5 TABLET ORAL at 08:21

## 2017-03-26 RX ADMIN — OXYCODONE HYDROCHLORIDE 30 MG: 30 TABLET ORAL at 03:46

## 2017-03-26 RX ADMIN — ALPRAZOLAM 0.5 MG: 0.5 TABLET ORAL at 21:24

## 2017-03-26 RX ADMIN — FLUTICASONE PROPIONATE 50 MCG: 50 SPRAY, METERED NASAL at 08:23

## 2017-03-26 RX ADMIN — OXYCODONE HYDROCHLORIDE 30 MG: 30 TABLET ORAL at 09:41

## 2017-03-26 RX ADMIN — MYCOPHENOLATE MOFETIL 500 MG: 250 CAPSULE ORAL at 21:24

## 2017-03-26 RX ADMIN — LEVOTHYROXINE SODIUM 137 MCG: 137 TABLET ORAL at 06:32

## 2017-03-26 RX ADMIN — POLYETHYLENE GLYCOL 3350, SODIUM SULFATE ANHYDROUS, SODIUM BICARBONATE, SODIUM CHLORIDE, POTASSIUM CHLORIDE 2 L: 236; 22.74; 6.74; 5.86; 2.97 POWDER, FOR SOLUTION ORAL at 17:00

## 2017-03-26 RX ADMIN — HEPARIN SODIUM 5000 UNITS: 5000 INJECTION, SOLUTION INTRAVENOUS; SUBCUTANEOUS at 14:42

## 2017-03-26 RX ADMIN — VALACYCLOVIR 500 MG: 500 TABLET, FILM COATED ORAL at 08:20

## 2017-03-26 RX ADMIN — PROCHLORPERAZINE EDISYLATE 10 MG: 5 INJECTION INTRAMUSCULAR; INTRAVENOUS at 14:43

## 2017-03-26 RX ADMIN — TACROLIMUS 1.5 MG: 1 CAPSULE ORAL at 21:26

## 2017-03-26 RX ADMIN — PREDNISONE 7 MG: 1 TABLET ORAL at 08:21

## 2017-03-26 RX ADMIN — TACROLIMUS 2 MG: 1 CAPSULE ORAL at 08:22

## 2017-03-26 RX ADMIN — AMBRISENTAN 10 MG: 10 TABLET, FILM COATED ORAL at 09:00

## 2017-03-26 RX ADMIN — MYCOPHENOLATE MOFETIL 500 MG: 250 CAPSULE ORAL at 08:21

## 2017-03-26 RX ADMIN — PRAVASTATIN SODIUM 20 MG: 20 TABLET ORAL at 21:24

## 2017-03-26 ASSESSMENT — PAIN SCALES - GENERAL
PAINLEVEL_OUTOF10: 0
PAINLEVEL_OUTOF10: 4
PAINLEVEL_OUTOF10: 8
PAINLEVEL_OUTOF10: 9
PAINLEVEL_OUTOF10: 4
PAINLEVEL_OUTOF10: 7

## 2017-03-26 ASSESSMENT — ENCOUNTER SYMPTOMS
CONSTIPATION: 1
NERVOUS/ANXIOUS: 1
SHORTNESS OF BREATH: 0
ABDOMINAL PAIN: 1
HEADACHES: 0

## 2017-03-26 ASSESSMENT — COPD QUESTIONNAIRES
DO YOU EVER COUGH UP ANY MUCUS OR PHLEGM?: NO/ONLY WITH OCCASIONAL COLDS OR INFECTIONS
DURING THE PAST 4 WEEKS HOW MUCH DID YOU FEEL SHORT OF BREATH: NONE/LITTLE OF THE TIME

## 2017-03-26 NOTE — CARE PLAN
Problem: Infection  Goal: Will remain free from infection  Intervention: Implement standard precautions and perform hand washing before and after patient contact  Protective precautions in place, signs and supplies outside of room      Problem: Pain Management  Goal: Pain level will decrease to patient’s comfort goal  Intervention: Follow pain managment plan developed in collaboration with patient and Interdisciplinary Team  Medicated per MAR for pain

## 2017-03-26 NOTE — PROGRESS NOTES
Patient resting in bed, no distress noted, call light in reach, will monitor.  Monitor summary: SB-SR 54-63, alisha to 50, 16/08/42.

## 2017-03-26 NOTE — PROGRESS NOTES
Patient resting in bed, assessment complete, awake and alert.  Scheduled medications given, needs addressed, call light in reach, will monitor.

## 2017-03-26 NOTE — PROGRESS NOTES
Pt anxious about Golytley. Therapeutic communication used. Pt educated about use, verbalizes understanding.  No further questions.

## 2017-03-26 NOTE — PROGRESS NOTES
Patient medicated per MAR for pain, needs addressed at this time, awaiting scheduled meds from pharmacy.

## 2017-03-26 NOTE — PROGRESS NOTES
"Hospital Medicine Progress Note, Adult, Complex               Author: Rula Orr Date & Time created: 3/26/2017  1:27 PM     Interval History:  58 YO female with complicated PMH including Liver transplant (2011, on prograf, prednisone, and cellcept), Pulmonary HTN (on tadalafil, Letairis) COPD (on spiriva), Mood disorder (on Zoloft, xanax, trazodone), dyslipidemia (on pravachol) Chronic pain and opioid dependence (on Oxycodone, gabapentin, flexeril) presents with intractable headache, nausea and vomiting. CSF not indicative of meningitis. She was very anxious yesterday with c/o severe HA and myriad other issues. I notified her transplant center of her admit.    3/23 Today her HA and back pain are improved, controlled and she's like a different person. She is still concerned about her lack of bowel movement. She has endoscopies on Monday. KUB shows a solitary focus of stool in ? Splenic flexure. Will try IV Reglan and SQ Relistor. Advised clear liquids as she will probably not be succesful w/ Re- prep Sunday.    3/24- only passed some sludge and gas \"a wet fart\" no significant BM, breathing is better. Discussed POC, repeating relistor.    3/25 still no BM, abdomen softer. Reviewed images of CT, KUB with RNs. Will continue Reglan. Golytely started    3/26 Vomited up a small amount of golytely last PM, suddenly became very anxious and agitated is fearful she is obstructed and needs surgery and colostomy. GI called - will see when able. Endoscopy scheduled for 8AM tomorrow. Getting plain CT today to eval if residual stool. Patient aware of careplan. Seen w/ RN, CT in process at time of note writing. IV access tenuous IVF stopped, still on clears    Review of Systems:  Review of Systems   Constitutional: Negative for malaise/fatigue.   Respiratory: Negative for shortness of breath.    Cardiovascular: Negative for chest pain.   Gastrointestinal: Positive for abdominal pain (ruq) and constipation.   Neurological: " Negative for headaches.   Psychiatric/Behavioral: The patient is nervous/anxious.        Physical Exam:  Physical Exam   Constitutional: She is oriented to person, place, and time. She appears well-developed and well-nourished. No distress.   HENT:   Head: Normocephalic and atraumatic.   Eyes: EOM are normal. Pupils are equal, round, and reactive to light. Right eye exhibits no discharge. Left eye exhibits no discharge. No scleral icterus.   Neck: Neck supple.   Cardiovascular: Normal rate and regular rhythm.    Pulmonary/Chest: Effort normal and breath sounds normal.   Abdominal: Soft. She exhibits no distension and no mass. There is tenderness (epig> RUQ). There is no rebound and no guarding.   More gassy bloaty- hyper BS all quads   Musculoskeletal: She exhibits no edema or tenderness.   Neurological: She is alert and oriented to person, place, and time. No cranial nerve deficit.   Skin: Skin is warm and dry. She is not diaphoretic.   Psychiatric:   anxious   Nursing note and vitals reviewed.      Labs:        Invalid input(s): [id number]      Recent Labs      17   SODIUM  144  143   POTASSIUM  3.5*  3.7   CHLORIDE  110  108   CO2  23  25   BUN  11  6*   CREATININE  1.03  0.93   CALCIUM  8.0*  8.8     Recent Labs      17   030   ALTSGPT  7  14   ASTSGOT  13  20   ALKPHOSPHAT  23*  21*   TBILIRUBIN  0.3  0.3   GLUCOSE  84  75     Recent Labs      17   RBC  3.70*   HEMOGLOBIN  10.6*   HEMATOCRIT  32.8*   PLATELETCT  66*     Recent Labs      17   WBC  3.3*   --    NEUTSPOLYS  57.60   --    LYMPHOCYTES  31.90   --    MONOCYTES  4.80   --    EOSINOPHILS  4.50   --    BASOPHILS  0.60   --    ASTSGOT  13  20   ALTSGPT  7  14   ALKPHOSPHAT  23*  21*   TBILIRUBIN  0.3  0.3           Hemodynamics:  Temp (24hrs), Av.7 °C (98 °F), Min:36.6 °C (97.8 °F), Max:36.8 °C (98.2 °F)  Temperature: 36.6 °C (97.8 °F)  Pulse  Avg:  72.5  Min: 51  Max: 101   Blood Pressure: 103/67 mmHg     Respiratory:    Respiration: 16, Pulse Oximetry: 92 %     Work Of Breathing / Effort: Mild  RUL Breath Sounds: Diminished, RML Breath Sounds: Diminished, RLL Breath Sounds: Diminished, MANJINDER Breath Sounds: Diminished, LLL Breath Sounds: Diminished  Fluids:    Intake/Output Summary (Last 24 hours) at 03/26/17 1327  Last data filed at 03/26/17 0900   Gross per 24 hour   Intake   2165 ml   Output   4900 ml   Net  -2735 ml     Weight: 77.1 kg (169 lb 15.6 oz)  GI/Nutrition:  Orders Placed This Encounter   Procedures   • Diet Order     Standing Status: Standing      Number of Occurrences: 1      Standing Expiration Date:      Order Specific Question:  Diet:     Answer:  Clear Liquids - No Red Foods [12]     Order Specific Question:  Diet:     Answer:  Diabetic [3]     Order Specific Question:  Diet:     Answer:  Cardiac [6]     Medical Decision Making, by Problem:  Active Hospital Problems    Diagnosis   • COPD (chronic obstructive pulmonary disease) (CMS-HCC) [J44.9] stable, no exacerbation   • CKD (chronic kidney disease) stage 3, GFR 30-59 ml/min- unclear cause, probably multifactorial [N18.3]   • MGUS (monoclonal gammopathy of unknown significance) [D47.2]   • Hypokalemia [E87.6] resolved   • Obstipation [K59.00] see above   • Opioid dependence (CMS-HCC) [F11.20] @ baseline dosing   • Mood disorder (CMS-HCC) [F39] labile once anxious becomes irrational   • HTN (hypertension) [I10] @ goal   • Headache [R51] resolved   • Delusional disorder (CMS-HCC) [F22] resolved- ? Related to pain/ Anxiety   • Type 2 diabetes mellitus (CMS-HCC) [E11.9] @ goal   • Liver transplanted (CMS-HCC) [Z94.4] stable, on meds   • Thrombocytopenia (CMS-HCC) [D69.6] chronic,stable   • Immunocompromised state (CMS-HCC) [D84.9]     NPO at midnight, further prep/ meds/ interventions pending CT outcome and GI eval      Labs reviewed, Medications reviewed and Radiology images reviewed  Yusef  catheter: No Holbrook      DVT Prophylaxis: Heparin    Ulcer prophylaxis: Yes (chronic med)  Antibiotics: Treating active infection/contamination beyond 24 hours perioperative coverage  Assessed for rehab: Patient returned to prior level of function, rehabilitation not indicated at this time

## 2017-03-27 VITALS
OXYGEN SATURATION: 98 % | TEMPERATURE: 96.8 F | HEIGHT: 68 IN | SYSTOLIC BLOOD PRESSURE: 135 MMHG | HEART RATE: 60 BPM | RESPIRATION RATE: 16 BRPM | WEIGHT: 169.75 LBS | DIASTOLIC BLOOD PRESSURE: 63 MMHG | BODY MASS INDEX: 25.73 KG/M2

## 2017-03-27 LAB
GLUCOSE BLD-MCNC: 70 MG/DL (ref 65–99)
GLUCOSE BLD-MCNC: 72 MG/DL (ref 65–99)

## 2017-03-27 PROCEDURE — 500066 HCHG BITE BLOCK, ECT: Performed by: INTERNAL MEDICINE

## 2017-03-27 PROCEDURE — 700111 HCHG RX REV CODE 636 W/ 250 OVERRIDE (IP): Performed by: INTERNAL MEDICINE

## 2017-03-27 PROCEDURE — 160209 HCHG ENDO MINUTES - EA ADDL 1 MIN LEVEL 5: Performed by: INTERNAL MEDICINE

## 2017-03-27 PROCEDURE — 82962 GLUCOSE BLOOD TEST: CPT

## 2017-03-27 PROCEDURE — 160002 HCHG RECOVERY MINUTES (STAT): Performed by: INTERNAL MEDICINE

## 2017-03-27 PROCEDURE — 700111 HCHG RX REV CODE 636 W/ 250 OVERRIDE (IP)

## 2017-03-27 PROCEDURE — 160204 HCHG ENDO MINUTES - 1ST 30 MINS LEVEL 5: Performed by: INTERNAL MEDICINE

## 2017-03-27 PROCEDURE — 160035 HCHG PACU - 1ST 60 MINS PHASE I: Performed by: INTERNAL MEDICINE

## 2017-03-27 PROCEDURE — 502240 HCHG MISC OR SUPPLY RC 0272: Performed by: INTERNAL MEDICINE

## 2017-03-27 PROCEDURE — 0DJD8ZZ INSPECTION OF LOWER INTESTINAL TRACT, VIA NATURAL OR ARTIFICIAL OPENING ENDOSCOPIC: ICD-10-PCS | Performed by: INTERNAL MEDICINE

## 2017-03-27 PROCEDURE — 160009 HCHG ANES TIME/MIN: Performed by: INTERNAL MEDICINE

## 2017-03-27 PROCEDURE — 99239 HOSP IP/OBS DSCHRG MGMT >30: CPT | Performed by: INTERNAL MEDICINE

## 2017-03-27 PROCEDURE — 700102 HCHG RX REV CODE 250 W/ 637 OVERRIDE(OP): Performed by: HOSPITALIST

## 2017-03-27 PROCEDURE — 160048 HCHG OR STATISTICAL LEVEL 1-5: Performed by: INTERNAL MEDICINE

## 2017-03-27 PROCEDURE — 0DJ08ZZ INSPECTION OF UPPER INTESTINAL TRACT, VIA NATURAL OR ARTIFICIAL OPENING ENDOSCOPIC: ICD-10-PCS | Performed by: INTERNAL MEDICINE

## 2017-03-27 PROCEDURE — A9270 NON-COVERED ITEM OR SERVICE: HCPCS | Performed by: HOSPITALIST

## 2017-03-27 PROCEDURE — A4606 OXYGEN PROBE USED W OXIMETER: HCPCS | Performed by: INTERNAL MEDICINE

## 2017-03-27 RX ORDER — SODIUM CHLORIDE, SODIUM LACTATE, POTASSIUM CHLORIDE, CALCIUM CHLORIDE 600; 310; 30; 20 MG/100ML; MG/100ML; MG/100ML; MG/100ML
1000 INJECTION, SOLUTION INTRAVENOUS
Status: COMPLETED | OUTPATIENT
Start: 2017-03-27 | End: 2017-03-27

## 2017-03-27 RX ORDER — VALACYCLOVIR HYDROCHLORIDE 500 MG/1
500 TABLET, FILM COATED ORAL 2 TIMES DAILY
Qty: 10 TAB | Refills: 0 | Status: SHIPPED | OUTPATIENT
Start: 2017-03-27 | End: 2017-04-12

## 2017-03-27 RX ORDER — MIDAZOLAM HYDROCHLORIDE 1 MG/ML
INJECTION INTRAMUSCULAR; INTRAVENOUS
Status: DISCONTINUED
Start: 2017-03-27 | End: 2017-03-27 | Stop reason: HOSPADM

## 2017-03-27 RX ADMIN — PROCHLORPERAZINE EDISYLATE 10 MG: 5 INJECTION INTRAMUSCULAR; INTRAVENOUS at 09:22

## 2017-03-27 RX ADMIN — FLUTICASONE PROPIONATE 50 MCG: 50 SPRAY, METERED NASAL at 09:21

## 2017-03-27 RX ADMIN — METOCLOPRAMIDE 10 MG: 5 INJECTION, SOLUTION INTRAMUSCULAR; INTRAVENOUS at 06:00

## 2017-03-27 RX ADMIN — SODIUM CHLORIDE, SODIUM LACTATE, POTASSIUM CHLORIDE, CALCIUM CHLORIDE 1000 ML: 600; 310; 30; 20 INJECTION, SOLUTION INTRAVENOUS at 12:00

## 2017-03-27 RX ADMIN — OXYCODONE HYDROCHLORIDE 30 MG: 30 TABLET ORAL at 06:19

## 2017-03-27 RX ADMIN — LEVOTHYROXINE SODIUM 137 MCG: 137 TABLET ORAL at 05:59

## 2017-03-27 ASSESSMENT — PAIN SCALES - GENERAL
PAINLEVEL_OUTOF10: 0
PAINLEVEL_OUTOF10: 9
PAINLEVEL_OUTOF10: 2
PAINLEVEL_OUTOF10: 0
PAINLEVEL_OUTOF10: 3

## 2017-03-27 ASSESSMENT — LIFESTYLE VARIABLES: EVER_SMOKED: NEVER

## 2017-03-27 NOTE — OP REPORT
DATE OF SERVICE:  03/27/2017    INDICATION FOR PROCEDURE:  Heartburn, anemia, and constipation.    CONSENT:  Informed consent was obtained directly from the patient after   benefits, risks and possible alternatives were discussed.    MEDICATIONS:  The patient received propofol-based regimen per Dr. Maria Bettencourt from anesthesia, please see her notes for full details.    PROCEDURE DESCRIPTION:  The patient was placed in the left lateral decubitus   position and provided with supplemental oxygen via nasal cannula.  Her vital   signs were monitored continuously throughout the procedure.  When ready, the   upper endoscope was placed in the patient's mouth and advanced easily and   carefully to the esophagus and subsequently the stomach and duodenum.    FINDINGS:  The esophagus appeared normal, including views of the GE junction.    The stomach had a gastric sleeve anatomy, but was otherwise normal.  The   pylorus appeared normal and was easily traversed by the scope.  In the   duodenum, the villous structures appeared grossly normal to the third portion.    The scope was then withdrawn after air was removed from the gastric lumen.    The patient was then prepared for the colonoscopy.  A digital rectal   examination was performed, which revealed internal hemorrhoids.  The scope was   then inserted into the rectum and advanced carefully in the usual manner to   the terminal ileum.  Photo documentation of the terminal ileum, ileocecal   valve, and appendiceal orifice were obtained.    FINDINGS:  The terminal ileum appeared normal.  The colon was well prepped,   and no abnormalities were noted, other than large internal hemorrhoids seen on   retroflex view in the rectum.  The rectum appeared normal without signs of   any proctitis.  The scope was then withdrawn after excess air was removed from   the colonic lumen.    COMPLICATIONS:  No complications during or in the immediate postoperative   period.    IMPRESSION:  1.   Gastric sleeve, upper GI anatomy.  2.  Otherwise, normal upper endoscopy.  3.  Large internal hemorrhoids.  4.  Otherwise, normal colonoscopy to the terminal ileum.    RECOMMENDATION:  The patient will be maintained on a daily bowel regimen.  She   may be discharged home by the hospitalist if she is otherwise clinically   stable, GI followup has already been arranged as an outpatient.       ____________________________________     YAYA BILLINGS MD    WP / NTS    DD:  03/27/2017 13:24:45  DT:  03/27/2017 14:22:22    D#:  [id number]  Job#:  [id number]

## 2017-03-27 NOTE — PROGRESS NOTES
Lost pt IV and unable to give reglan.  Unable to start IV after 2 attempts. Next shift notified and will attempt to restart.  Pt resting comfortably in room with call light in reach.

## 2017-03-27 NOTE — OR NURSING
"1130 Pt arrived from T-[id number] with RN on a monitor. Pt. placed in surgical gown, placed on tele and oxygen monitor, reviewed the plan of care with the pt and her spouse, pt states she is  very anxious and upset. States she has been in the hospital for 1 week and \"nothing\" has been done. States she is not staying after the procedure and will leave \"AMA\" after the procedure if she is not DC'd today. I listened to the pts concerns, I offered comfort measures, pt. states she has no pain at this time. Call light at the bedside and pt understands how to use it.   "

## 2017-03-27 NOTE — OR SURGEON
Immediate Post-Operative Note      PreOp Diagnosis: constipation, heartburn, anemia    PostOp Diagnosis: 1. Gastric sleeve UGI anatomy, o/w normal EGD  2. Internal hemorrhoids, o/w normal colonoscopy to the terminal ileum.    Procedure(s):  GASTROSCOPY - Wound Class: Contaminated  COLONOSCOPY - Wound Class: Contaminated    Surgeon(s):  Osman Matt M.D.    Anesthesiologist/Type of Anesthesia:  Anesthesiologist: Maria Bettencourt M.D./SELIN    Surgical Staff:  Circulator: Maria Del Carmen Durand R.N.; Mesha Sanchez R.N.  Endoscopy Technician: Anne Marie Hernandes    Specimen: none    Estimated Blood Loss: none    Findings: as above    Complications: none    Plan:  Continue laxatives PRN.  May be d/c home by hospitalist if o/w clinically stable.  Has GI f/u arranged in June.        3/27/2017 1:19 PM Osman Matt

## 2017-03-27 NOTE — CONSULTS
DATE OF SERVICE:  03/26/2017    CHIEF COMPLAINT:  Abdominal discomfort, constipation.    HISTORY OF PRESENT ILLNESS:  I was asked by the primary care hospitalist to   consult on this patient.  She has been to the ER several times with the   inability to eliminate her bowel contents.  However, she did have an x-ray,   which showed a fairly little amount of stool during her last work up prior to   admission.    She is also scheduled for upper and lower endoscopy.  She has iron deficiency   anemia.  This could be related to her sleeve gastrectomy that she did for   morbid obesity.  She has lost over 100 pounds.  She is now around 168.  She   also states that the oral iron she started in January briefly messed her up.    She became constipated and had difficulty with elimination.  She states that   prior to that, she had been for many decades on opioids without any problem.    The patient will be scheduled for an upper and lower endoscopy and this was   actually scheduled for tomorrow in the outpatient setting and because of her   oxygen dependence, was scheduled here at [facility] by Dr. Matt, who also follows   her for her posttransplant state in the outpatient setting.    She is on Protonix for GERD-like symptoms.  So, we did discuss the iron   metabolism and the fact that sometimes acid suppression can also prevent iron   absorption, but nevertheless, the sleeve gastrectomy may also contribute to   the iron deficiency.    The patient is 6 years out from a transplant that was done for sarcoidosis.    Her liver function tests are completely normal.  Her hemoglobin is 10.7,   platelet count is at 66.    PAST MEDICAL HISTORY:  1.  COPD.  2.  Chronic kidney disease.  3.  Monoclonal gammopathy of unknown significance.  4.  Hypertension.  5.  Anxiety.  6.  Diabetes.  7.  Status post liver transplant, sarcoidosis.    REVIEW OF SYSTEMS:  Notable for immunocompromised state secondary to all the   medications.  She has  thrombocytopenia.  She has a history of opioid   dependency and takes MS Contin.  She has obstipation.  She has anemia, iron   deficiency.  The rest of the review of systems is negative per HPI.    FAMILY HISTORY:  Negative for gastrointestinal malignancy disease.    SOCIAL HISTORY:  No alcohol or tobacco use.    MEDICATIONS:  Include Xanax, aspirin, Flexeril, ferrous sulfate, Flonase,   gabapentin, Synthroid, CellCept, omeprazole, oxycodone, pravastatin,   prednisone, Zoloft, Prograf, tadalafil, Spiriva.    PHYSICAL EXAMINATION:  VITAL SIGNS:  Temperature 37.6, pulse 75, respirations 16, blood pressure   103/67, weight is 77.  GENERAL:  Nontoxic, nondiaphoretic.  HEENT:  Sclerae nonicteric.  NECK:  Supple, no asymmetry.  CHEST:  Unlabored oxygen in place.  CARDIOVASCULAR:  RRR.  ABDOMEN:  Soft, nondistended.  No hepatosplenomegaly, no rebound, no rigidity,   no distention, no anasarca, no hepatosplenomegaly.  EXTREMITIES:  No CCE.  NEUROPSYCHIATRY:  Appropriate exam and interview nonfocal.  SKIN:  No rash, no erythema, gross indurations.    LABORATORY DATA:  Electrolytes within normal limits.  Creatinine 0.93.  LFTs   normal.  Hematology, hemoglobin 6 days ago was 12.3, it is now 10.6, MCV and   MCH are within normal range.  Platelet count 66.  Iron stores are currently   not available, but in January, her iron saturation was 10%, TIBC was 399 and   iron was 34.  Her platelet count is currently at 66 and has been stable in   that range since January that is as far as back as we checked it.    IMPRESSION:  1.  Anemia.  2.  Iron deficiency.  3.  Status post gastric sleeve bypass for obesity, also on chronic PPI   therapy.  4.  Transplant of the liver in 2011 for sarcoidosis, on multiple medications,   immunosuppressed.  5.  On chronic aspirin 81 mg, certainly also can lead to chronic iron loss.    RECOMMENDATION:  EGD and colonoscopy.  She is also thrombocytopenic.  We will   confirm with Dr. Matt that this is indeed  scheduled for outpatient setting and   we will write for another half a gallon since her bowel preparation that was   started by the primary care team yesterday has not been completely adequate   and she still has nontransparent liquified stool.    We thank you for this consultation.  We will follow the patient with you.       ____________________________________     Fredrick Nieves MD EMD / NTS    DD:  03/26/2017 15:33:53  DT:  03/26/2017 20:03:15    D#:  [id number]  Job#:  [id number]

## 2017-03-27 NOTE — OR NURSING
**1322: * received pt from OR with Dr. Bettencourt, VSS breathing even and unlabored. Pt positioned on left side, given sips of 7-up,   1335:  brought to bedside,   1345: pt eating a sandwich, very agitated easily when told I had to get an order for her diet in order for her to eat, ok per md for patient to eat, also very anxious to d/c home, states that when she gets up to room she is going to get dressed and demand she gets to leave asap, I tried to explain to her that as soon as the doctor (hospitalist) is able to round with her, Dr. Matt thought it was ok to d/c home,   1355: report called to Donell FRANCIS on tele,    1410: pt transported on tele monitor by RN.

## 2017-03-27 NOTE — PROGRESS NOTES
Report received from PACU RN. Patient tolerated endoscopy and colonoscopy without complications, sitting up in bed eating, vital signs stable.

## 2017-03-27 NOTE — PROGRESS NOTES
This patient has been sinus rhythm during shift.  She is sched to have a Endoscopy and Colonoscopy tomorrow am.  She has already finished half gallon of golytely.  No BM yet.  No episodes of vomitting.  Will continue to monitor for any changes.

## 2017-03-27 NOTE — DISCHARGE INSTRUCTIONS
Discharge Instructions    Discharged to home by car with friend. Discharged via wheelchair, hospital escort: Yes.  Special equipment needed: Oxygen    Be sure to schedule a follow-up appointment with your primary care doctor or any specialists as instructed.     Discharge Plan:   Diet Plan: Discussed (advance as tolerated)  Activity Level: Discussed (as tolerated)  Confirmed Follow up Appointment: Appointment Scheduled  Confirmed Symptoms Management: Discussed  Medication Reconciliation Updated: Yes  Influenza Vaccine Indication: Patient Refuses    I understand that a diet low in cholesterol, fat, and sodium is recommended for good health. Unless I have been given specific instructions below for another diet, I accept this instruction as my diet prescription.   Other diet: advance as tolerated    Special Instructions: None    · Is patient discharged on Warfarin / Coumadin?   No     · Is patient Post Blood Transfusion?  No    Constipation, Adult  Constipation is when a person has fewer than three bowel movements a week, has difficulty having a bowel movement, or has stools that are dry, hard, or larger than normal. As people grow older, constipation is more common. A low-fiber diet, not taking in enough fluids, and taking certain medicines may make constipation worse.   CAUSES   · Certain medicines, such as antidepressants, pain medicine, iron supplements, antacids, and water pills.    · Certain diseases, such as diabetes, irritable bowel syndrome (IBS), thyroid disease, or depression.    · Not drinking enough water.    · Not eating enough fiber-rich foods.    · Stress or travel.    · Lack of physical activity or exercise.    · Ignoring the urge to have a bowel movement.    · Using laxatives too much.    SIGNS AND SYMPTOMS   · Having fewer than three bowel movements a week.    · Straining to have a bowel movement.    · Having stools that are hard, dry, or larger than normal.    · Feeling full or bloated.    · Pain in  the lower abdomen.    · Not feeling relief after having a bowel movement.    DIAGNOSIS   Your health care provider will take a medical history and perform a physical exam. Further testing may be done for severe constipation. Some tests may include:  · A barium enema X-ray to examine your rectum, colon, and, sometimes, your small intestine.    · A sigmoidoscopy to examine your lower colon.    · A colonoscopy to examine your entire colon.  TREATMENT   Treatment will depend on the severity of your constipation and what is causing it. Some dietary treatments include drinking more fluids and eating more fiber-rich foods. Lifestyle treatments may include regular exercise. If these diet and lifestyle recommendations do not help, your health care provider may recommend taking over-the-counter laxative medicines to help you have bowel movements. Prescription medicines may be prescribed if over-the-counter medicines do not work.   HOME CARE INSTRUCTIONS   · Eat foods that have a lot of fiber, such as fruits, vegetables, whole grains, and beans.  · Limit foods high in fat and processed sugars, such as french fries, hamburgers, cookies, candies, and soda.    · A fiber supplement may be added to your diet if you cannot get enough fiber from foods.    · Drink enough fluids to keep your urine clear or pale yellow.    · Exercise regularly or as directed by your health care provider.    · Go to the restroom when you have the urge to go. Do not hold it.    · Only take over-the-counter or prescription medicines as directed by your health care provider. Do not take other medicines for constipation without talking to your health care provider first.    SEEK IMMEDIATE MEDICAL CARE IF:   · You have bright red blood in your stool.    · Your constipation lasts for more than 4 days or gets worse.    · You have abdominal or rectal pain.    · You have thin, pencil-like stools.    · You have unexplained weight loss.  MAKE SURE YOU:    · Understand these instructions.  · Will watch your condition.  · Will get help right away if you are not doing well or get worse.     This information is not intended to replace advice given to you by your health care provider. Make sure you discuss any questions you have with your health care provider.     Document Released: 09/15/2005 Document Revised: 01/08/2016 Document Reviewed: 09/29/2014  [employer] Interactive Patient Education ©2016 [employer]      Depression / Suicide Risk    As you are discharged from this [facility] facility, it is important to learn how to keep safe from harming yourself.    Recognize the warning signs:  · Abrupt changes in personality, positive or negative- including increase in energy   · Giving away possessions  · Change in eating patterns- significant weight changes-  positive or negative  · Change in sleeping patterns- unable to sleep or sleeping all the time   · Unwillingness or inability to communicate  · Depression  · Unusual sadness, discouragement and loneliness  · Talk of wanting to die  · Neglect of personal appearance   · Rebelliousness- reckless behavior  · Withdrawal from people/activities they love  · Confusion- inability to concentrate     If you or a loved one observes any of these behaviors or has concerns about self-harm, here's what you can do:  · Talk about it- your feelings and reasons for harming yourself  · Remove any means that you might use to hurt yourself (examples: pills, rope, extension cords, firearm)  · Get professional help from the community (Mental Health, Substance Abuse, psychological counseling)  · Do not be alone:Call your Safe Contact- someone whom you trust who will be there for you.  · Call your local CRISIS HOTLINE 748-3928 or 715-035-0236  · Call your local Children's Mobile Crisis Response Team [place] (909) 417-5781 or www.[place]  · Call the toll free National Suicide Prevention Hotlines   · National Suicide  Prevention Lifeline 550-421-NZIN (0968)  · National [place] Line Network 800-SUICIDE (835-3871)

## 2017-03-27 NOTE — CARE PLAN
Problem: Infection  Goal: Will remain free from infection  Reverse isolation precautions     Problem: Discharge Barriers/Planning  Goal: Patient’s continuum of care needs will be met  Intervention: Assess potential discharge barriers on admission and throughout hospital stay  Collaborate with nursing team to ensure efficient discharge

## 2017-03-28 ENCOUNTER — PATIENT OUTREACH (OUTPATIENT)
Dept: HEALTH INFORMATION MANAGEMENT | Facility: OTHER | Age: 57
End: 2017-03-28

## 2017-03-28 NOTE — PROGRESS NOTES
· Placed discharge outreach phone call to patient s/p hospital discharge 3/27/17.  Left voicemail with my contact information and instructions to return my phone call.    · 3/29/17 at 4:41 PM--Second attempt at discharge outreach phone call, spoke with pt, discharge outreach completed.

## 2017-03-28 NOTE — DISCHARGE SUMMARY
CHIEF COMPLAINT ON ADMISSION  Chief Complaint   Patient presents with   • Dizziness     pt reports that she had a liver transplant in 12/2011, concerned today that she is having a liver rejection. general appearance unwell.    • Headache   • Neck Pain       CODE STATUS  Full Code    HPI & HOSPITAL COURSE  56 YO female with complicated PMH including Liver transplant (2011, on prograf, prednisone, and cellcept), Pulmonary HTN (on tadalafil, Letairis) COPD (on spiriva), Mood disorder (on Zoloft, xanax, trazodone), dyslipidemia (on pravachol) Chronic pain and opioid dependence (on Oxycodone, gabapentin, flexeril) presents with intractable headache, nausea and vomiting. CSFwas not indicative of meningitis. She was very anxious yesterday with c/o severe HA and myriad other issues. I notified her transplant center of her admit.diagnosed presumptively with PNA but had no rales cough or sputum above baseline, she was also diagnosed as septic but review of data suggested she was not septic. She had high levels of anxiety during her stay, she had had 3 consecutive visits PTA in ER that centered around lack of output w/ Golytely and worry about Bowel obstruction. Only a small amount of stool was seen on imaging, follow up imaging after repeat golyteley reglan and relistor X2 showed clearance of solid stool. SHe had EGD and Colonoscopy w/o any significant pathology today. Dr Matt recommended decreasing her PO iron supplement. Patient was not receptive to the suggestion that the chronic use of opiates had a role in her bowel motility and she will explore possible solutions w/ her Primary care provider.          Therefore, she is discharged in good and stable condition with close outpatient follow-up.    SPECIFIC OUTPATIENT FOLLOW-UP  Primary Care  GI- [facility]    DISCHARGE PROBLEM LIST  Active Problems:    MGUS (monoclonal gammopathy of unknown significance) POA: Unknown      Overview: January 2013: PET scan with abnormal uptake,  followed by Dr. Reza.    CKD (chronic kidney disease) stage 3, GFR 30-59 ml/min- unclear cause, probably multifactorial POA: Yes      Overview: Consult Dr. Diaz, 3/2016    Immunocompromised state (CMS-HCC) POA: Yes    Thrombocytopenia (CMS-HCC) POA: Yes    Type 2 diabetes mellitus (CMS-HCC) POA: Unknown    Liver transplanted (CMS-HCC)    Opioid dependence (CMS-HCC) POA: Unknown    Mood disorder (CMS-HCC) POA: Unknown    HTN (hypertension) POA: Unknown    Headache POA: Unknown    Delusional disorder (CMS-HCC) POA: Unknown    Hypokalemia POA: Unknown    Obstipation POA: Unknown  Resolved Problems:    * No resolved hospital problems. *      FOLLOW UP  Future Appointments  Date Time Provider Department Center   4/3/2017 10:30 AM Maxwell Phan M.D. [facility] None   4/5/2017 8:00 AM [facility] None   4/12/2017 10:00 AM Bernarda Reyes [facility]   5/10/2017 8:00 AM [facility] None   6/7/2017 8:00 AM [facility] None     No follow-up provider specified.    MEDICATIONS ON DISCHARGE   Roxana [place]   Home Medication Instructions MORELIA:[id number]    Printed on:03/27/17 1726   Medication Information                      alprazolam (XANAX) 0.5 MG Tab  Take 1 Tab by mouth 3 times a day.             ambrisentan (LETAIRIS) 10 MG tablet  Take 1 Tab by mouth every day.             ascorbic acid (ASCORBIC ACID) 500 MG Tab  Take 500 mg by mouth every day.             aspirin EC (ECOTRIN) 81 MG Tablet Delayed Response  Take 1 Tab by mouth every morning.             CITRACAL MAXIMUM 315-250 MG-UNIT Tab  TAKE 2 TABS BY MOUTH 2X/ DAY WITH FOOD BEFORE AND AFTER DINNER FOR LIFE-CRUSH 1ST 3 MONTH, SPACE MVI             cyclobenzaprine (FLEXERIL) 10 MG Tab  Take 1 Tab by mouth 2 times a day as needed.             docusate sodium (COLACE) 100 MG Cap  Take 100 mg by mouth 2 times a day.             ferrous sulfate (FEOSOL) 220 (44 FE) MG/5ML Elixir  Take 5 mL by mouth every day.             fluticasone (FLONASE) 50  MCG/ACT nasal spray  Spray 1 Spray in nose every day.             furosemide (LASIX) 40 MG Tab  Take 40 mg by mouth every morning.             gabapentin (NEURONTIN) 600 MG tablet  Take 1 Tab by mouth 3 times a day.             levothyroxine (SYNTHROID) 137 MCG Tab  Take 1 Tab by mouth every day.             mycophenolate (CELLCEPT) 250 MG Cap  Take 500 mg by mouth 2 times a day.             omeprazole (PRILOSEC) 40 MG delayed-release capsule  Take 1 Cap by mouth every day.             ondansetron (ZOFRAN ODT) 4 MG TABLET DISPERSIBLE  Take 1 Tab by mouth every 8 hours as needed for Nausea/Vomiting. Nausea and vomiting             oxycodone immediate release (ROXICODONE) 10 MG immediate release tablet  Take 1-3 Tabs by mouth every 6 hours as needed for Moderate Pain.             Polyethyl Glycol-Propyl Glycol (SYSTANE OP)  1 Drop by Ophthalmic route 3 times a day.             polyethylene glycol/lytes (MIRALAX) Pack  Take 17 g by mouth every day.             pravastatin (PRAVACHOL) 20 MG Tab  Take 1 Tab by mouth every day.             predniSONE (DELTASONE) 5 MG Tab  Take 7 mg by mouth every morning.             Probiotic Product (PROBIOTIC DAILY PO)  Take 1 Cap by mouth every day.             sennosides (SENOKOT) 8.6 MG Tab  Take 17.2 mg by mouth 2 times a day.             sertraline (ZOLOFT) 100 MG Tab  Take 100 mg by mouth every bedtime.             tacrolimus (PROGRAF) 0.5 MG Cap  Take 0.5-2 mg by mouth 2 times a day. 2 mg at 0800  1.5 mg at 2000             Tadalafil, PAH, (ADCIRCA) 20 MG Tab  Take 40 mg by mouth every bedtime. Indications: Pulmonary Arterial Hypertension             tiotropium (SPIRIVA) 18 MCG Cap  Inhale 1 Cap by mouth every day.             trazodone (DESYREL) 50 MG Tab  Take 1-2 Tabs by mouth at bedtime as needed for Sleep.             valacyclovir (VALTREX) 500 MG Tab  Take 1 Tab by mouth 2 Times a Day.                 DIET  Orders Placed This Encounter   Procedures   • DIET NPO      Standing Status: Standing      Number of Occurrences: 8      Standing Expiration Date:      Order Specific Question:  Restrict to:     Answer:  Strict [1]   • DISCONTINUE DIET TRAY     Standing Status: Standing      Number of Occurrences: 1      Standing Expiration Date:        ACTIVITY  As tolerated    CONSULTATIONS  GI- MD Saw    PROCEDURES  EGD, colonoscopy- Osman Matt MD    LABORATORY  Lab Results   Component Value Date/Time    SODIUM 143 03/26/2017 03:09 AM    POTASSIUM 3.7 03/26/2017 03:09 AM    CHLORIDE 108 03/26/2017 03:09 AM    CO2 25 03/26/2017 03:09 AM    GLUCOSE 75 03/26/2017 03:09 AM    BUN 6* 03/26/2017 03:09 AM    CREATININE 0.93 03/26/2017 03:09 AM        Lab Results   Component Value Date/Time    WBC 3.3* 03/24/2017 03:02 AM    HEMOGLOBIN 10.6* 03/24/2017 03:02 AM    HEMATOCRIT 32.8* 03/24/2017 03:02 AM    PLATELET COUNT 66* 03/24/2017 03:02 AM        Total time of the discharge process exceeds 36 minutes.

## 2017-03-31 RX ORDER — PRENATAL VIT 75/IRON/FOLIC/OM3 28-800-440
COMBINATION PACKAGE (EA) ORAL
Qty: 120 TAB | Refills: 11 | Status: SHIPPED | OUTPATIENT
Start: 2017-03-31 | End: 2018-04-27

## 2017-04-03 ENCOUNTER — OFFICE VISIT (OUTPATIENT)
Dept: CARDIOLOGY | Facility: MEDICAL CENTER | Age: 57
End: 2017-04-03
Payer: MEDICARE

## 2017-04-03 VITALS
SYSTOLIC BLOOD PRESSURE: 106 MMHG | DIASTOLIC BLOOD PRESSURE: 60 MMHG | BODY MASS INDEX: 26.37 KG/M2 | WEIGHT: 174 LBS | HEART RATE: 80 BPM | OXYGEN SATURATION: 95 % | HEIGHT: 68 IN

## 2017-04-03 DIAGNOSIS — K59.03 DRUG-INDUCED CONSTIPATION: ICD-10-CM

## 2017-04-03 DIAGNOSIS — I25.2 H/O NON-ST ELEVATION MYOCARDIAL INFARCTION (NSTEMI): ICD-10-CM

## 2017-04-03 DIAGNOSIS — J96.11 CHRONIC RESPIRATORY FAILURE WITH HYPOXIA (HCC): ICD-10-CM

## 2017-04-03 DIAGNOSIS — D69.6 THROMBOCYTOPENIA (HCC): ICD-10-CM

## 2017-04-03 DIAGNOSIS — D84.9 IMMUNOCOMPROMISED STATE (HCC): ICD-10-CM

## 2017-04-03 DIAGNOSIS — K56.609 SBO (SMALL BOWEL OBSTRUCTION) (HCC): ICD-10-CM

## 2017-04-03 DIAGNOSIS — I27.20 PULMONARY HYPERTENSION (HCC): ICD-10-CM

## 2017-04-03 DIAGNOSIS — F22 DELUSIONAL DISORDER (HCC): ICD-10-CM

## 2017-04-03 DIAGNOSIS — D86.9 SARCOIDOSIS: ICD-10-CM

## 2017-04-03 DIAGNOSIS — Z94.4 LIVER TRANSPLANTED (HCC): ICD-10-CM

## 2017-04-03 DIAGNOSIS — N18.30 CKD (CHRONIC KIDNEY DISEASE) STAGE 3, GFR 30-59 ML/MIN (HCC): ICD-10-CM

## 2017-04-03 DIAGNOSIS — K76.81 HEPATOPULMONARY SYNDROME (HCC): ICD-10-CM

## 2017-04-03 PROCEDURE — 99214 OFFICE O/P EST MOD 30 MIN: CPT | Performed by: INTERNAL MEDICINE

## 2017-04-03 PROCEDURE — 3014F SCREEN MAMMO DOC REV: CPT | Performed by: INTERNAL MEDICINE

## 2017-04-03 PROCEDURE — 1036F TOBACCO NON-USER: CPT | Performed by: INTERNAL MEDICINE

## 2017-04-03 PROCEDURE — 1111F DSCHRG MED/CURRENT MED MERGE: CPT | Performed by: INTERNAL MEDICINE

## 2017-04-03 PROCEDURE — G8419 CALC BMI OUT NRM PARAM NOF/U: HCPCS | Performed by: INTERNAL MEDICINE

## 2017-04-03 PROCEDURE — G8432 DEP SCR NOT DOC, RNG: HCPCS | Performed by: INTERNAL MEDICINE

## 2017-04-03 RX ORDER — PREDNISONE 1 MG/1
TABLET ORAL
COMMUNITY
Start: 2017-03-13 | End: 2017-06-22

## 2017-04-03 ASSESSMENT — ENCOUNTER SYMPTOMS
WEAKNESS: 0
NEUROLOGICAL NEGATIVE: 1
CARDIOVASCULAR NEGATIVE: 1
COUGH: 0
GASTROINTESTINAL NEGATIVE: 1
CHILLS: 0
LOSS OF CONSCIOUSNESS: 0
STRIDOR: 0
PALPITATIONS: 0
MUSCULOSKELETAL NEGATIVE: 1
ORTHOPNEA: 0
DIZZINESS: 0
SHORTNESS OF BREATH: 0
WHEEZING: 0
FEVER: 0
CONSTITUTIONAL NEGATIVE: 1
RESPIRATORY NEGATIVE: 1
HEMOPTYSIS: 0
SORE THROAT: 0
PND: 0
CLAUDICATION: 0
SPUTUM PRODUCTION: 0
EYES NEGATIVE: 1
BRUISES/BLEEDS EASILY: 0

## 2017-04-03 NOTE — MR AVS SNAPSHOT
"        Roxana Cuba   4/3/2017 10:30 AM   Office Visit   MRN: [id number]    Department:  [facility]   Dept Phone:  [phone]    Description:  Female : 1960   Provider:  Maxwell Phan M.D.           Reason for Visit     Follow-Up           Allergies as of 4/3/2017     Allergen Noted Reactions    Rhubarb 2009   Anaphylaxis    Vancomycin Hcl 2016       Causes red man syndrome when infused to fast        You were diagnosed with     CKD (chronic kidney disease) stage 3, GFR 30-59 ml/min   [466171]       Chronic respiratory failure with hypoxia (CMS-HCC)   [875269]       Drug-induced constipation   [725291]       Delusional disorder (CMS-HCC)   [5634594]       H/O non-ST elevation myocardial infarction (NSTEMI)   [259226]       Hepatopulmonary syndrome (CMS-HCC)   [573.5.ICD-9-CM]       Liver transplanted (CMS-HCC)   [278731]       Immunocompromised state (CMS-HCC)   [659536]       Pulmonary hypertension (CMS-HCC)   [433177]       Sarcoidosis (CMS-HCC)   [135.ICD-9-CM]       SBO (small bowel obstruction) (CMS-HCC)   [885647]       Thrombocytopenia (CMS-HCC)   [421339]         Vital Signs     Blood Pressure Pulse Height Weight Body Mass Index Oxygen Saturation    106/60 mmHg 80 1.727 m (5' 7.99\") 78.926 kg (174 lb) 26.46 kg/m2 95%    Last Menstrual Period Smoking Status                2000 Passive Smoke Exposure - Never Smoker          Basic Information     Date Of Birth Sex Race Ethnicity Preferred Language    1960 Female White Non- English      Your appointments     2017  8:00 AM   Adult Draw/Collection with [facility]   [facility] (--)    [address]  [place] [address] [phone]            2017 10:00 AM   Established Patient with Bernarda Reyes D.O.   [facility])    [address] [place] [address], [address]  [place] [address] [phone]           You will be receiving a confirmation call a few days before your " appointment from our automated call confirmation system.            May 10, 2017  8:00 AM   Adult Draw/Collection with LAB [facility]   [facility] (--)    [address] [place] [address]   [phone]            Jun 07, 2017  8:00 AM   Adult Draw/Collection with LAB [facility]   [facility] (--)    [address] [place] [address]   [phone]              Problem List              ICD-10-CM Priority Class Noted - Resolved    Status post liver transplant Dr. [place] ([facility]) Z94.4 Medium  3/13/2012 - Present    Hypothyroidism, adult E03.9 Low  3/13/2012 - Present    History of pancreatitis Z87.19 Medium  6/20/2012 - Present    H/O non-ST elevation myocardial infarction (NSTEMI) I25.2   5/16/2013 - Present    Lower extremity weakness M62.81 Low  6/26/2013 - Present    Anemia D64.9 Medium  9/13/2013 - Present    Anxiety F41.9 Low  11/4/2013 - Present    Insomnia G47.00 Low  11/4/2013 - Present    Sarcoidosis (CMS-HCC) D86.9 Medium  11/14/2013 - Present    Diabetes mellitus, type 2 (CMS-HCC) E11.9 Medium  11/14/2013 - Present    COPD (chronic obstructive pulmonary disease) (CMS-HCC) J44.9 High  11/14/2013 - Present    MGUS (monoclonal gammopathy of unknown significance) D47.2 Medium  11/14/2013 - Present    CKD (chronic kidney disease) stage 3, GFR 30-59 ml/min- unclear cause, probably multifactorial N18.3 Medium  2/25/2014 - Present    Hepatopulmonary syndrome (CMS-HCC) K76.81 Medium  3/5/2014 - Present    Ostium secundum type atrial septal defect, S/P percutaneous closure Q21.1 Medium  3/17/2014 - Present    Mild aortic regurgitation and aortic valve sclerosis I35.1 High  3/17/2014 - Present    Vitamin D deficiency E55.9 Medium  8/26/2014 - Present    Chronic respiratory failure (CMS-HCC) J96.10 Medium  11/13/2014 - Present    Pure hypercholesterolemia E78.00 High  12/9/2014 - Present    Pulmonary hypertension (CMS-[facility]) I27.2 High  2/3/2015 - Present    Essential hypertension, benign I10 Low  2/3/2015 - Present    On  prednisone therapy Z79.52   7/6/2015 - Present    Mild mitral regurgitation I34.0   7/14/2015 - Present    Splenomegaly R16.1   7/14/2015 - Present    Immunocompromised state (CMS-HCC) D84.9   11/14/2015 - Present    Splenic lesion D73.9   11/14/2015 - Present    Hx of gastric bypass Z98.890   1/6/2016 - Present    Back pain M54.9   2/22/2016 - Present    Long term prescription benzodiazepine use Z79.899   4/12/2016 - Present    Chronic pain syndrome G89.4   6/15/2016 - Present    Other allergic rhinitis J30.89   7/22/2016 - Present    Long term prescription opiate use Z79.891   7/26/2016 - Present    Thrombocytopenia (CMS-HCC) D69.6   8/15/2016 - Present    History of lung biopsy Z98.890   10/17/2016 - Present    SBO (small bowel obstruction) (CMS-HCC) K56.69   1/3/2017 - Present    GERD (gastroesophageal reflux disease) K21.9   1/4/2017 - Present    Nausea & vomiting R11.2   1/16/2017 - Present    Iron deficiency E61.1   1/20/2017 - Present    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   2/21/2017 - Present    Type 2 diabetes mellitus (CMS-HCC) E11.9   3/21/2017 - Present    Sepsis (CMS-HCC) A41.9   3/21/2017 - Present    CAP (community acquired pneumonia) J18.9   3/21/2017 - Present    Liver transplanted (CMS-HCC) Z94.4   3/21/2017 - Present    Opioid dependence (CMS-HCC) F11.20   3/22/2017 - Present    Mood disorder (CMS-HCC) F39   3/22/2017 - Present    HTN (hypertension) I10   3/22/2017 - Present    Headache R51   3/22/2017 - Present    Delusional disorder (CMS-HCC) F22   3/22/2017 - Present    Hypokalemia E87.6   3/23/2017 - Present    Obstipation K59.00   3/23/2017 - Present    Drug-induced constipation K59.03   4/3/2017 - Present      Health Maintenance        Date Due Completion Dates    IMM HEP B VACCINE (1 of 3 - Primary Series) 1960 ---    IMM DTaP/Tdap/Td Vaccine (1 - Tdap) 2/12/1979 ---    RETINAL SCREENING 4/1/2017 4/1/2016, 1/12/2015    DIABETES MONOFILAMENT / LE EXAM 2/13/2018  (Originally 1960) ---    A1C SCREENING 7/11/2017 1/11/2017, 10/19/2016, 6/3/2016, 3/2/2016, 10/16/2015, 6/2/2015, 5/19/2015, 5/6/2014, 4/1/2014, 11/14/2013, 3/26/2013, 3/22/2013, 3/5/2013, 9/12/2012, 5/20/2012, 4/9/2012, 3/19/2012, 2/12/2012, 2/5/2012    FASTING LIPID PROFILE 11/23/2017 11/23/2016, 8/23/2016, 7/5/2016, 6/3/2016, 6/3/2016, 3/2/2016, 12/7/2015, 10/16/2015, 6/2/2015, 3/2/2015, 3/1/2015, 12/22/2014, 3/5/2014, 3/19/2012, 3/3/2010, 8/5/2009, 7/29/2009    URINE ACR / MICROALBUMIN 1/11/2018 1/11/2017, 3/19/2012    MAMMOGRAM 2/1/2018 2/1/2017, 12/13/2016, 11/2/2015, 10/24/2014, 10/24/2014, 10/20/2014, 8/4/2013 (Done)    Override on 8/4/2013: Done ([facility])    SERUM CREATININE 3/26/2018 3/26/2017, 3/24/2017, 3/22/2017, 3/21/2017, 3/20/2017, 3/19/2017, 3/8/2017, 2/15/2017, 2/15/2017, 1/17/2017, 1/16/2017, 1/14/2017, 1/11/2017, 1/11/2017, 1/11/2017, 1/5/2017, 1/4/2017, 1/3/2017, 12/21/2016, 12/7/2016, 11/23/2016, 11/23/2016, 10/26/2016, 10/19/2016, 10/19/2016, 10/12/2016, 10/7/2016, 9/6/2016, 9/5/2016, 9/5/2016, 9/4/2016, 9/3/2016, 9/2/2016, 9/2/2016, 9/1/2016, 8/23/2016, 8/23/2016, 8/20/2016, 8/16/2016, 8/15/2016, 8/14/2016, 8/2/2016, 7/22/2016, 7/19/2016, 7/18/2016, 7/17/2016, 7/16/2016, 7/15/2016, 7/14/2016, 7/13/2016, 7/12/2016, 7/11/2016, 7/10/2016, 7/5/2016, 7/5/2016, 6/15/2016, 6/14/2016, 6/3/2016, 6/3/2016, 6/3/2016, 5/24/2016, 5/23/2016, 5/14/2016, 5/11/2016, 5/10/2016, 5/9/2016, 5/8/2016, 5/7/2016, 5/6/2016, 5/5/2016, 5/4/2016, 5/3/2016, 5/2/2016, 5/1/2016, 4/30/2016, 4/29/2016, 4/6/2016, 4/6/2016, 3/2/2016, 3/2/2016, 3/2/2016, 2/26/2016, 2/25/2016, 2/24/2016, 2/23/2016, 2/22/2016, 2/19/2016, 2/3/2016, 1/5/2016, 12/7/2015, 12/1/2015, 11/30/2015, 11/24/2015, 11/17/2015, 11/16/2015, 11/15/2015, 11/13/2015, 11/5/2015, 10/16/2015, 10/7/2015, 9/8/2015, 8/25/2015, 8/21/2015, 8/19/2015, 8/12/2015, 7/28/2015, 6/30/2015, 6/16/2015, 6/2/2015, 5/19/2015, 5/5/2015, 4/21/2015, 4/7/2015, 3/22/2015,  3/18/2015, 3/17/2015, 3/12/2015, 3/9/2015, 3/4/2015, 3/2/2015, 3/1/2015, 2/27/2015, 2/17/2015, 2/3/2015, 1/20/2015, 1/6/2015, 12/22/2014, 12/19/2014, 12/9/2014, 11/25/2014, 11/18/2014, 11/17/2014, 11/16/2014, 11/14/2014, 11/13/2014, 11/10/2014, 10/28/2014, 10/14/2014, 9/29/2014, 9/16/2014, 9/2/2014, 8/19/2014, 8/5/2014, 7/21/2014, 7/8/2014, 6/24/2014, 6/16/2014, 6/11/2014, 6/3/2014, 5/20/2014, 5/6/2014, 5/4/2014, 5/2/2014, 4/28/2014, 4/27/2014, 4/25/2014, 4/22/2014, 3/21/2014, 3/17/2014, 3/5/2014, 3/4/2014, 2/25/2014, 2/24/2014, 2/22/2014, 2/18/2014, 1/27/2014, 1/15/2014, 1/7/2014, 1/7/2014, 1/4/2014, 1/3/2014, 12/9/2013, 11/23/2013, 11/22/2013, 11/21/2013, 11/20/2013, 11/18/2013, 11/15/2013, 11/14/2013, 11/13/2013, 11/4/2013, 10/28/2013, 10/4/2013, 10/1/2013, 9/30/2013, 9/29/2013, 9/27/2013, 9/19/2013, 9/18/2013, 9/16/2013, 9/15/2013, 9/14/2013, 9/12/2013, 9/3/2013, 8/16/2013, 8/6/2013, 8/2/2013, 8/1/2013, 7/30/2013    COLONOSCOPY 3/27/2027 3/27/2017, 9/20/2010            Current Immunizations     13-VALENT PCV PREVNAR 8/21/2016  9:40 AM, 8/21/2016    Influenza TIV (IM) 9/28/2015, 10/14/2014, 9/16/2013 11:43 PM, 9/27/2012 11:57 PM, 10/1/2011    Influenza Vaccine Adult HD 10/1/2016    Influenza Vaccine Quad Inj (Preserved) 9/28/2015, 10/1/2014, 9/16/2013, 9/27/2012, 10/10/2011, 10/19/2010    Pneumococcal polysaccharide vaccine (PPSV-23) 1/4/2017  6:09 AM, 1/4/2017, 10/8/2009  4:25 PM, 10/8/2009, 7/30/2009, 7/30/2009      Below and/or attached are the medications your provider expects you to take. Review all of your home medications and newly ordered medications with your provider and/or pharmacist. Follow medication instructions as directed by your provider and/or pharmacist. Please keep your medication list with you and share with your provider. Update the information when medications are discontinued, doses are changed, or new medications (including over-the-counter products) are added; and carry medication  information at all times in the event of emergency situations     Allergies:  RHUBARB - Anaphylaxis     VANCOMYCIN HCL - (reactions not documented)               Medications  Valid as of: April 03, 2017 - 11:00 AM    Generic Name Brand Name Tablet Size Instructions for use    ALPRAZolam (Tab) XANAX 0.5 MG Take 1 Tab by mouth 3 times a day.        Ambrisentan (Tab) LETAIRIS 10 MG Take 1 Tab by mouth every day.        Ascorbic Acid (Tab) ascorbic acid 500 MG Take 500 mg by mouth every day.        Aspirin (Tablet Delayed Response) ECOTRIN 81 MG Take 1 Tab by mouth every morning.        Calcium Citrate-Vitamin D (Tab) CITRACAL MAXIMUM 315-250 MG-UNIT TAKE 2 TABS BY MOUTH 2X/ DAY WITH FOOD BEFORE AND AFTER DINNER FOR LIFE-CRUSH 1ST 3 MONTH, SPACE MVI        Cyclobenzaprine HCl (Tab) FLEXERIL 10 MG Take 1 Tab by mouth 2 times a day as needed.        Docusate Sodium (Cap) COLACE 100 MG Take 100 mg by mouth 2 times a day.        Ferrous Sulfate (Elixir) FEOSOL 220 (44 FE) MG/5ML Take 5 mL by mouth every day.        Fluticasone Propionate (Suspension) FLONASE 50 MCG/ACT Spray 1 Spray in nose every day.        Furosemide (Tab) LASIX 40 MG Take 40 mg by mouth every morning.        Gabapentin (Tab) NEURONTIN 600 MG Take 1 Tab by mouth 3 times a day.        Levothyroxine Sodium (Tab) SYNTHROID 137 MCG Take 1 Tab by mouth every day.        Linaclotide (Cap) Linaclotide 145 MCG Take 1 Tab by mouth 1 time daily as needed.        Mycophenolate Mofetil (Cap) CELLCEPT 250 MG Take 500 mg by mouth 2 times a day.        Omeprazole (CAPSULE DELAYED RELEASE) PRILOSEC 40 MG Take 1 Cap by mouth every day.        Ondansetron (TABLET DISPERSIBLE) ZOFRAN ODT 4 MG Take 1 Tab by mouth every 8 hours as needed for Nausea/Vomiting. Nausea and vomiting        OxyCODONE HCl (Tab) ROXICODONE 10 MG Take 1-3 Tabs by mouth every 6 hours as needed for Moderate Pain.        Polyethyl Glycol-Propyl Glycol   1 Drop by Ophthalmic route 3 times a day.         Polyethylene Glycol 3350 (Pack) MIRALAX  Take 17 g by mouth every day.        Pravastatin Sodium (Tab) PRAVACHOL 20 MG Take 1 Tab by mouth every day.        PredniSONE (Tab) DELTASONE 5 MG Take 7 mg by mouth every morning.        PredniSONE (Tab) DELTASONE 1 MG         Probiotic Product   Take 1 Cap by mouth every day.        Sennosides (Tab) SENOKOT 8.6 MG Take 17.2 mg by mouth 2 times a day.        Sertraline HCl (Tab) ZOLOFT 100 MG Take 100 mg by mouth every bedtime.        Tacrolimus (Cap) PROGRAF 0.5 MG Take 0.5-2 mg by mouth 2 times a day. 2 mg at 0800  1.5 mg at 2000        Tadalafil (PAH) (Tab) Tadalafil (PAH) 20 MG Take 40 mg by mouth every bedtime. Indications: Pulmonary Arterial Hypertension        Tiotropium Bromide Monohydrate (Cap) SPIRIVA 18 MCG Inhale 1 Cap by mouth every day.        TraZODone HCl (Tab) DESYREL 50 MG Take 1-2 Tabs by mouth at bedtime as needed for Sleep.        ValACYclovir HCl (Tab) VALTREX 500 MG Take 1 Tab by mouth 2 Times a Day.        .                 Medicines prescribed today were sent to:     [facility][id number] - [place] - [address]    [address] [place] [address]    Phone: [phone] Fax: [phone]    Open 24 Hours?: No    [facility] [place], [place] - [address]    [address] [facility] [address]    Phone: [phone] Fax: [phone]    Open 24 Hours?: No      Medication refill instructions:       If your prescription bottle indicates you have medication refills left, it is not necessary to call your provider’s office. Please contact your pharmacy and they will refill your medication.    If your prescription bottle indicates you do not have any refills left, you may request refills at any time through one of the following ways: The online [employer] system (except Urgent Care), by calling your provider’s office, or by asking your pharmacy to contact your provider’s office with a refill request. Medication  refills are processed only during regular business hours and may not be available until the next business day. Your provider may request additional information or to have a follow-up visit with you prior to refilling your medication.   *Please Note: Medication refills are assigned a new Rx number when refilled electronically. Your pharmacy may indicate that no refills were authorized even though a new prescription for the same medication is available at the pharmacy. Please request the medicine by name with the pharmacy before contacting your provider for a refill.           [employer] Access Code: Activation code not generated  Current [employer] Status: Active

## 2017-04-03 NOTE — PROGRESS NOTES
"Subjective:   Roxana Cuba is a 57 y.o. female who presents today as a follow-up for her chronic kidney disease, pulmonary hypertension and opioid-induced constipation. Since her last hospitalization she continues to do well. She's not been having any progressive shortness breath chest pain palpitations or PND. She continues to take her Lasix on an as-needed basis. She does not report any weight gain. Her biggest issue is because she takes chronic pain medications is having issues with constipation. She's tried numerous agents in the past including MiraLAX and Dulcolax and Senokot. She has an upcoming follow-up with [facility] in approximately one month.    Past Medical History   Diagnosis Date   • Cirrhosis (CMS-HCC) December 2011     [place] [facility] [place] S/P cholecystectomy    • GERD (gastroesophageal reflux disease)          • Psychiatric disorder      Mood disorder   • Fracture of left foot    • Bronchitis       2016   • CKD (chronic kidney disease) stage 3, GFR 30-59 ml/min    • Breath shortness    • Chronic fatigue and malaise    • VRE (vancomycin-resistant Enterococci)      02-17-17, pt declines knowledge of this   • Low back pain 02-17-17     and left foot, 7/10   • Pneumonia      aspiration,    • Mild aortic regurgitation and aortic valve sclerosis     • Splenomegaly    • Small bowel obstruction (CMS-HCC)      01-   • On home oxygen therapy      4 liters, Dr. Gaming   • Pulmonary hypertension (CMS-HCC)         [place] [place]      reports hx of, resolved w/weight loss. Reports still checking bloodsugars twice daily and using insulin PRN   • Hypothyroid    • H/O Clostridium difficile infection 02-17-17     reports \"16 months ago\". Current stool sample 01-26-17 neg   • Platelet disorder (CMS-HCC)      low platelets     Past Surgical History   Procedure Laterality Date   • Pr anesth,nose,sinus surgery     • Latricia by laparoscopy  9/19/2009     Performed by BIRD ABDI " at SURGERY [facility]   • Exam under anesthesia  11/11/2009     Performed by BIRD ABDI at SURGERY [facility]   • Hysterectomy, total abdominal           • Other       Breast reduction   • Gastroscopy-endo  3/12/2010     Performed by CAMELIA SAMANO at ENDOSCOPY [facility]   • Bronchoscopy-endo  5/29/2012     Performed by SUYAPA CAMARENA at [facility]   • Bronchoscopy-endo  6/19/2012     Performed by MARLENA MURILLO at [facility]   • Sigmoidoscopy flex  9/26/2012     Performed by Kristopher Cardoso M.D. at [facility]   • Recovery  2/27/2013     Performed by [facility] at SURGERY SAME DAY [facility]   • Bronchoscopy-endo  11/15/2013     Performed by Ha Perez M.D. at [facility]   • Recovery  1/21/2014     Performed by [facility] at SURGERY SAME DAY [facility]   • Recovery  3/24/2014     Performed by [facility] at SURGERY SAME DAY [facility]   • Hemorrhoidectomy  11/11/2009     Performed by BIRD ABDI at SURGERY [facility]   • Gastroscopy  9/28/2012     Performed by Aravind Richards M.D. at SURGERY [facility]   • Carpal tunnel release           • Bone marrow aspiration  12/31/2012     Performed by Josemanuel Real M.D. at [facility]   • Bone marrow biopsy, ndl/trocar  12/31/2012     Performed by Josemanuel Real M.D. at ENDOSCOPY [facility]   • Recovery  3/31/2014     Performed by [facility] at [facility]   • Other abdominal surgery  December 2011     Liver transplant at [facility] by Dr. Canada.   • Bone marrow aspiration  3/16/2015     Performed by Marlena Hi M.D. at ENDOSCOPY [facility]   • Bone marrow biopsy, ndl/trocar  3/16/2015     Performed by Marlena Hi M.D. at [facility]   • Lung biopsy open  11/2016   • Tonsillectomy     • Other abdominal surgery       Gasric Sleeve   • Breast biopsy Left  2/21/2017     Procedure: BREAST BIOPSY - WIRE LOCALIZED EXCISONAL ;  Surgeon: Jane Zhao M.D.;  Location: SURGERY SAME DAY [facility];  Service:    • Colonoscopy N/A 3/27/2017     Procedure: COLONOSCOPY;  Surgeon: Osman Matt M.D.;  Location: SURGERY SAME DAY [facility];  Service:    • Gastroscopy N/A 3/27/2017     Procedure: GASTROSCOPY;  Surgeon: Osman Matt M.D.;  Location: SURGERY SAME DAY [facility];  Service:      Family History   Problem Relation Age of Onset   • Other Father      Unknown (dead 10 years)   • Diabetes Father    • Heart Disease Father    • Hypertension Father    • Hyperlipidemia Father    • Stroke Father    • Non-contributory Mother    • Hyperlipidemia Mother    • Alcohol/Drug Mother    • Cancer Paternal Aunt    • Alcohol/Drug Maternal Grandmother    • Alcohol/Drug Maternal Grandfather      History   Smoking status   • Passive Smoke Exposure - Never Smoker   Smokeless tobacco   • Never Used     Comment: avoid all tobacco products     Allergies   Allergen Reactions   • Rhubarb Anaphylaxis   • Vancomycin Hcl      Causes red man syndrome when infused to fast       Outpatient Encounter Prescriptions as of 4/3/2017   Medication Sig Dispense Refill   • predniSONE (DELTASONE) 1 MG Tab      • Linaclotide 145 MCG Cap Take 1 Tab by mouth 1 time daily as needed. 30 Cap 11   • CITRACAL MAXIMUM 315-250 MG-UNIT Tab TAKE 2 TABS BY MOUTH 2X/ DAY WITH FOOD BEFORE AND AFTER DINNER FOR LIFE-CRUSH 1ST 3 MONTH, SPACE  Tab 11   • ferrous sulfate (FEOSOL) 220 (44 FE) MG/5ML Elixir Take 5 mL by mouth every day. 1 Bottle    • tacrolimus (PROGRAF) 0.5 MG Cap Take 0.5-2 mg by mouth 2 times a day. 2 mg at 0800  1.5 mg at 2000     • ascorbic acid (ASCORBIC ACID) 500 MG Tab Take 500 mg by mouth every day.     • Polyethyl Glycol-Propyl Glycol (SYSTANE OP) 1 Drop by Ophthalmic route 3 times a day.     • sennosides (SENOKOT) 8.6 MG Tab Take 17.2 mg by mouth 2 times a day.     • Probiotic Product  (PROBIOTIC DAILY PO) Take 1 Cap by mouth every day.     • polyethylene glycol/lytes (MIRALAX) Pack Take 17 g by mouth every day.     • aspirin EC (ECOTRIN) 81 MG Tablet Delayed Response Take 1 Tab by mouth every morning. 90 Tab 4   • gabapentin (NEURONTIN) 600 MG tablet Take 1 Tab by mouth 3 times a day. 270 Tab 4   • levothyroxine (SYNTHROID) 137 MCG Tab Take 1 Tab by mouth every day. 90 Tab 4   • omeprazole (PRILOSEC) 40 MG delayed-release capsule Take 1 Cap by mouth every day. 90 Cap 4   • ondansetron (ZOFRAN ODT) 4 MG TABLET DISPERSIBLE Take 1 Tab by mouth every 8 hours as needed for Nausea/Vomiting. Nausea and vomiting 270 Tab 4   • alprazolam (XANAX) 0.5 MG Tab Take 1 Tab by mouth 3 times a day. 90 Tab 2   • tiotropium (SPIRIVA) 18 MCG Cap Inhale 1 Cap by mouth every day. 90 Cap 4   • fluticasone (FLONASE) 50 MCG/ACT nasal spray Spray 1 Spray in nose every day. 16 g 10   • predniSONE (DELTASONE) 5 MG Tab Take 7 mg by mouth every morning.     • sertraline (ZOLOFT) 100 MG Tab Take 100 mg by mouth every bedtime.     • ambrisentan (LETAIRIS) 10 MG tablet Take 1 Tab by mouth every day. 30 Tab 11   • docusate sodium (COLACE) 100 MG Cap Take 100 mg by mouth 2 times a day.     • mycophenolate (CELLCEPT) 250 MG Cap Take 500 mg by mouth 2 times a day.     • furosemide (LASIX) 40 MG Tab Take 40 mg by mouth every morning.     • Tadalafil, PAH, (ADCIRCA) 20 MG Tab Take 40 mg by mouth every bedtime. Indications: Pulmonary Arterial Hypertension     • pravastatin (PRAVACHOL) 20 MG Tab Take 1 Tab by mouth every day. 30 Tab 11   • valacyclovir (VALTREX) 500 MG Tab Take 1 Tab by mouth 2 Times a Day. 10 Tab 0   • cyclobenzaprine (FLEXERIL) 10 MG Tab Take 1 Tab by mouth 2 times a day as needed. (Patient taking differently: Take 10 mg by mouth 3 times a day as needed.) 90 Tab 4   • trazodone (DESYREL) 50 MG Tab Take 1-2 Tabs by mouth at bedtime as needed for Sleep. 90 Tab 4   • oxycodone immediate release (ROXICODONE) 10 MG  "immediate release tablet Take 1-3 Tabs by mouth every 6 hours as needed for Moderate Pain. 90 Tab 0     No facility-administered encounter medications on file as of 4/3/2017.     Review of Systems   Constitutional: Negative.  Negative for fever, chills and malaise/fatigue.   HENT: Negative.  Negative for sore throat.    Eyes: Negative.    Respiratory: Negative.  Negative for cough, hemoptysis, sputum production, shortness of breath, wheezing and stridor.    Cardiovascular: Negative.  Negative for chest pain, palpitations, orthopnea, claudication, leg swelling and PND.   Gastrointestinal: Negative.    Genitourinary: Negative.    Musculoskeletal: Negative.    Skin: Negative.    Neurological: Negative.  Negative for dizziness, loss of consciousness and weakness.   Endo/Heme/Allergies: Negative.  Does not bruise/bleed easily.   All other systems reviewed and are negative.       Objective:   /60 mmHg  Pulse 80  Ht 1.727 m (5' 7.99\")  Wt 78.926 kg (174 lb)  BMI 26.46 kg/m2  SpO2 95%  LMP 01/03/2000    Physical Exam   Constitutional: She is oriented to person, place, and time. She appears well-developed and well-nourished. No distress.   HENT:   Head: Normocephalic.   Mouth/Throat: Oropharynx is clear and moist.   Eyes: EOM are normal. Pupils are equal, round, and reactive to light. Right eye exhibits no discharge. Left eye exhibits no discharge. No scleral icterus.   Neck: Normal range of motion. Neck supple. No JVD present. No tracheal deviation present.   Cardiovascular: Normal rate, regular rhythm, S1 normal, S2 normal, normal heart sounds, intact distal pulses and normal pulses.  Exam reveals no gallop, no S3, no S4 and no friction rub.    No murmur heard.   No systolic murmur is present    No diastolic murmur is present   Pulses:       Carotid pulses are 2+ on the right side, and 2+ on the left side.       Radial pulses are 2+ on the right side, and 2+ on the left side.        Dorsalis pedis pulses are 2+ " on the right side, and 2+ on the left side.        Posterior tibial pulses are 2+ on the right side, and 2+ on the left side.   Pulmonary/Chest: Effort normal and breath sounds normal. No respiratory distress. She has no wheezes. She has no rales.   Abdominal: Soft. Bowel sounds are normal. She exhibits no distension and no mass. There is no tenderness. There is no rebound and no guarding.   Musculoskeletal: She exhibits no edema.   Neurological: She is alert and oriented to person, place, and time. No cranial nerve deficit.   Skin: Skin is warm and dry. She is not diaphoretic. No pallor.   Psychiatric: She has a normal mood and affect. Her behavior is normal. Judgment and thought content normal.   Nursing note and vitals reviewed.      Assessment:     1. CKD (chronic kidney disease) stage 3, GFR 30-59 ml/min- unclear cause, probably multifactorial     2. Chronic respiratory failure with hypoxia (CMS-HCC)     3. Drug-induced constipation  Linaclotide 145 MCG Cap   4. Delusional disorder (CMS-HCC)     5. H/O non-ST elevation myocardial infarction (NSTEMI)     6. Hepatopulmonary syndrome (CMS-HCC)     7. Liver transplanted (CMS-HCC)     8. Immunocompromised state (CMS-HCC)     9. Pulmonary hypertension (CMS-HCC)     10. Sarcoidosis (CMS-HCC)     11. SBO (small bowel obstruction) (CMS-HCC)     12. Thrombocytopenia (CMS-HCC)         Medical Decision Making:  Today's Assessment / Status / Plan:     57-year-old female with pulmonary hypertension on medications and stable at this point. We will try an opioid antagonist for her constipation. We will likely need to get insurance preapproval for this. Otherwise I will see her back in 4 months.    1. PAH, WHO 1, WHO2, WHO 3 possible  - cont ambrisentan 10 daily  - cont tidalifil 40 daily  - cont lasix    2. ASD s/p Closure    - clinical f/u    3. Aortic Stenosis, mild     - clinical f/u    4. Constipation    - try linaclotide 145 mg daily    - cont miralaz    Thank for you  allowing me to take part in your patient's care, please call should you have any questions or would like to discuss this patient.

## 2017-04-03 NOTE — Clinical Note
[facility]   [address]  [place] [address]  Phone: [phone]  Fax: [phone]              Roxana Cuba  1960    Encounter Date: 4/3/2017    Maxwell Phan M.D.          PROGRESS NOTE:  Subjective:   Roxana Cuba is a 57 y.o. female who presents today as a follow-up for her chronic kidney disease, pulmonary hypertension and opioid-induced constipation. Since her last hospitalization she continues to do well. She's not been having any progressive shortness breath chest pain palpitations or PND. She continues to take her Lasix on an as-needed basis. She does not report any weight gain. Her biggest issue is because she takes chronic pain medications is having issues with constipation. She's tried numerous agents in the past including MiraLAX and Dulcolax and Senokot. She has an upcoming follow-up with [facility] in approximately one month.    Past Medical History   Diagnosis Date   • Cirrhosis (CMS-HCC) December 2011     Status post liver transplant at [facility] [place]   • S/P cholecystectomy    • GERD (gastroesophageal reflux disease)          • Psychiatric disorder      Mood disorder   • Fracture of left foot    • Bronchitis       2016   • CKD (chronic kidney disease) stage 3, GFR 30-59 ml/min    • Breath shortness    • Chronic fatigue and malaise    • VRE (vancomycin-resistant Enterococci)      02-17-17, pt declines knowledge of this   • Low back pain 02-17-17     and left foot, 7/10   • Pneumonia      aspiration,    • Mild aortic regurgitation and aortic valve sclerosis     • Splenomegaly    • Small bowel obstruction (CMS-HCC)      01-   • On home oxygen therapy      4 liters, Dr. Gaming   • Pulmonary hypertension (CMS-HCC)         • Diabetes ([facility])      reports hx of, resolved w/weight loss. Reports still checking bloodsugars twice daily and using insulin PRN   • Hypothyroid    • H/O Clostridium difficile infection 02-17-17    " reports \"16 months ago\". Current stool sample 01-26-17 neg   • Platelet disorder (CMS-HCC)      low platelets     Past Surgical History   Procedure Laterality Date   • Pr anesth,nose,sinus surgery     • Latricia by laparoscopy  9/19/2009     Performed by BIRD ABDI at [facility]   • Exam under anesthesia  11/11/2009     Performed by BIRD ABDI at [facility]   • Hysterectomy, total abdominal           • Other       Breast reduction   • Gastroscopy-endo  3/12/2010     Performed by CAMELIA SAMANO at ENDOSCOPY [facility]   • Bronchoscopy-endo  5/29/2012     Performed by SUYAPA CAMARENA at [facility]   • Bronchoscopy-endo  6/19/2012     Performed by MARLENA MURILLO at [facility]   • Sigmoidoscopy flex  9/26/2012     Performed by Kristopher Cardoso M.D. at [facility]   • Recovery  2/27/2013     Performed by [facility] at SURGERY SAME DAY [facility]   • Bronchoscopy-endo  11/15/2013     Performed by Ha Perez M.D. at [facility]   • Recovery  1/21/2014     Performed by [facility] at SUR[facility]ERY SAME DAY [facility]   • Recovery  3/24/2014     Performed by Cath[facility]Recovery Surgery at SURGERY SAME DAY [facility]   • Hemorrhoidectomy  11/11/2009     Performed by BIRD ABDI at [facility]   • Gastroscopy  9/28/2012     Performed by Aravind Richards M.D. at [facility]   • Carpal tunnel release           • Bone marrow aspiration  12/31/2012     Performed by Josemanuel Real M.D. at ENDOSCOPY [facility]   • Bone marrow biopsy, ndl/trocar  12/31/2012     Performed by Josemanuel Real M.D. at [facility]   • Recovery  3/31/2014     Performed by [facility] at [facility]   • Other abdominal surgery  December 2011     Liver transplant at [facility] [place] by Dr. Canada.   • Bone marrow aspiration  3/16/2015     Performed by Marlena Hi M.D. at " ENDOSCOPY [facility]   • Bone marrow biopsy, ndl/trocar  3/16/2015     Performed by Freddie Hi M.D. at ENDOSCOPY [facility]   • Lung biopsy open  11/2016   • Tonsillectomy     • Other abdominal surgery       Gasric Sleeve   • Breast biopsy Left 2/21/2017     Procedure: BREAST BIOPSY - WIRE LOCALIZED EXCISONAL ;  Surgeon: Jane Zhao M.D.;  Location: SURGERY SAME DAY [facility] ORS;  Service:    • Colonoscopy N/A 3/27/2017     Procedure: COLONOSCOPY;  Surgeon: Osman Matt M.D.;  Location: [facility]ERY [facility];  Service:    • Gastroscopy N/A 3/27/2017     Procedure: GASTROSCOPY;  Surgeon: Osman Matt M.D.;  Location: SURGERY SAME DAY [facility] ORS;  Service:      Family History   Problem Relation Age of Onset   • Other Father      Unknown (dead 10 years)   • Diabetes Father    • Heart Disease Father    • Hypertension Father    • Hyperlipidemia Father    • Stroke Father    • Non-contributory Mother    • Hyperlipidemia Mother    • Alcohol/Drug Mother    • Cancer Paternal Aunt    • Alcohol/Drug Maternal Grandmother    • Alcohol/Drug Maternal Grandfather      History   Smoking status   • Passive Smoke Exposure - Never Smoker   Smokeless tobacco   • Never Used     Comment: avoid all tobacco products     Allergies   Allergen Reactions   • Rhubarb Anaphylaxis   • Vancomycin Hcl      Causes red man syndrome when infused to fast       Outpatient Encounter Prescriptions as of 4/3/2017   Medication Sig Dispense Refill   • predniSONE (DELTASONE) 1 MG Tab      • Linaclotide 145 MCG Cap Take 1 Tab by mouth 1 time daily as needed. 30 Cap 11   • CITRACAL MAXIMUM 315-250 MG-UNIT Tab TAKE 2 TABS BY MOUTH 2X/ DAY WITH FOOD BEFORE AND AFTER DINNER FOR LIFE-CRUSH 1ST 3 MONTH, SPACE  Tab 11   • ferrous sulfate (FEOSOL) 220 (44 FE) MG/5ML Elixir Take 5 mL by mouth every day. 1 Bottle    • tacrolimus (PROGRAF) 0.5 MG Cap Take 0.5-2 mg by mouth 2 times a day. 2 mg at 0800  1.5 mg at 2000     •  ascorbic acid (ASCORBIC ACID) 500 MG Tab Take 500 mg by mouth every day.     • Polyethyl Glycol-Propyl Glycol (SYSTANE OP) 1 Drop by Ophthalmic route 3 times a day.     • sennosides (SENOKOT) 8.6 MG Tab Take 17.2 mg by mouth 2 times a day.     • Probiotic Product (PROBIOTIC DAILY PO) Take 1 Cap by mouth every day.     • polyethylene glycol/lytes (MIRALAX) Pack Take 17 g by mouth every day.     • aspirin EC (ECOTRIN) 81 MG Tablet Delayed Response Take 1 Tab by mouth every morning. 90 Tab 4   • gabapentin (NEURONTIN) 600 MG tablet Take 1 Tab by mouth 3 times a day. 270 Tab 4   • levothyroxine (SYNTHROID) 137 MCG Tab Take 1 Tab by mouth every day. 90 Tab 4   • omeprazole (PRILOSEC) 40 MG delayed-release capsule Take 1 Cap by mouth every day. 90 Cap 4   • ondansetron (ZOFRAN ODT) 4 MG TABLET DISPERSIBLE Take 1 Tab by mouth every 8 hours as needed for Nausea/Vomiting. Nausea and vomiting 270 Tab 4   • alprazolam (XANAX) 0.5 MG Tab Take 1 Tab by mouth 3 times a day. 90 Tab 2   • tiotropium (SPIRIVA) 18 MCG Cap Inhale 1 Cap by mouth every day. 90 Cap 4   • fluticasone (FLONASE) 50 MCG/ACT nasal spray Spray 1 Spray in nose every day. 16 g 10   • predniSONE (DELTASONE) 5 MG Tab Take 7 mg by mouth every morning.     • sertraline (ZOLOFT) 100 MG Tab Take 100 mg by mouth every bedtime.     • ambrisentan (LETAIRIS) 10 MG tablet Take 1 Tab by mouth every day. 30 Tab 11   • docusate sodium (COLACE) 100 MG Cap Take 100 mg by mouth 2 times a day.     • mycophenolate (CELLCEPT) 250 MG Cap Take 500 mg by mouth 2 times a day.     • furosemide (LASIX) 40 MG Tab Take 40 mg by mouth every morning.     • Tadalafil, PAH, (ADCIRCA) 20 MG Tab Take 40 mg by mouth every bedtime. Indications: Pulmonary Arterial Hypertension     • pravastatin (PRAVACHOL) 20 MG Tab Take 1 Tab by mouth every day. 30 Tab 11   • valacyclovir (VALTREX) 500 MG Tab Take 1 Tab by mouth 2 Times a Day. 10 Tab 0   • cyclobenzaprine (FLEXERIL) 10 MG Tab Take 1 Tab by mouth 2  "times a day as needed. (Patient taking differently: Take 10 mg by mouth 3 times a day as needed.) 90 Tab 4   • trazodone (DESYREL) 50 MG Tab Take 1-2 Tabs by mouth at bedtime as needed for Sleep. 90 Tab 4   • oxycodone immediate release (ROXICODONE) 10 MG immediate release tablet Take 1-3 Tabs by mouth every 6 hours as needed for Moderate Pain. 90 Tab 0     No facility-administered encounter medications on file as of 4/3/2017.     Review of Systems   Constitutional: Negative.  Negative for fever, chills and malaise/fatigue.   HENT: Negative.  Negative for sore throat.    Eyes: Negative.    Respiratory: Negative.  Negative for cough, hemoptysis, sputum production, shortness of breath, wheezing and stridor.    Cardiovascular: Negative.  Negative for chest pain, palpitations, orthopnea, claudication, leg swelling and PND.   Gastrointestinal: Negative.    Genitourinary: Negative.    Musculoskeletal: Negative.    Skin: Negative.    Neurological: Negative.  Negative for dizziness, loss of consciousness and weakness.   Endo/Heme/Allergies: Negative.  Does not bruise/bleed easily.   All other systems reviewed and are negative.       Objective:   /60 mmHg  Pulse 80  Ht 1.727 m (5' 7.99\")  Wt 78.926 kg (174 lb)  BMI 26.46 kg/m2  SpO2 95%  LMP 01/03/2000    Physical Exam   Constitutional: She is oriented to person, place, and time. She appears well-developed and well-nourished. No distress.   HENT:   Head: Normocephalic.   Mouth/Throat: Oropharynx is clear and moist.   Eyes: EOM are normal. Pupils are equal, round, and reactive to light. Right eye exhibits no discharge. Left eye exhibits no discharge. No scleral icterus.   Neck: Normal range of motion. Neck supple. No JVD present. No tracheal deviation present.   Cardiovascular: Normal rate, regular rhythm, S1 normal, S2 normal, normal heart sounds, intact distal pulses and normal pulses.  Exam reveals no gallop, no S3, no S4 and no friction rub.    No murmur " heard.   No systolic murmur is present    No diastolic murmur is present   Pulses:       Carotid pulses are 2+ on the right side, and 2+ on the left side.       Radial pulses are 2+ on the right side, and 2+ on the left side.        Dorsalis pedis pulses are 2+ on the right side, and 2+ on the left side.        Posterior tibial pulses are 2+ on the right side, and 2+ on the left side.   Pulmonary/Chest: Effort normal and breath sounds normal. No respiratory distress. She has no wheezes. She has no rales.   Abdominal: Soft. Bowel sounds are normal. She exhibits no distension and no mass. There is no tenderness. There is no rebound and no guarding.   Musculoskeletal: She exhibits no edema.   Neurological: She is alert and oriented to person, place, and time. No cranial nerve deficit.   Skin: Skin is warm and dry. She is not diaphoretic. No pallor.   Psychiatric: She has a normal mood and affect. Her behavior is normal. Judgment and thought content normal.   Nursing note and vitals reviewed.      Assessment:     1. CKD (chronic kidney disease) stage 3, GFR 30-59 ml/min- unclear cause, probably multifactorial     2. Chronic respiratory failure with hypoxia (CMS-HCC)     3. Drug-induced constipation  Linaclotide 145 MCG Cap   4. Delusional disorder (CMS-HCC)     5. H/O non-ST elevation myocardial infarction (NSTEMI)     6. Hepatopulmonary syndrome (CMS-HCC)     7. Liver transplanted (CMS-HCC)     8. Immunocompromised state (CMS-HCC)     9. Pulmonary hypertension (CMS-HCC)     10. Sarcoidosis (CMS-HCC)     11. SBO (small bowel obstruction) (CMS-HCC)     12. Thrombocytopenia (CMS-HCC)         Medical Decision Making:  Today's Assessment / Status / Plan:     57-year-old female with pulmonary hypertension on medications and stable at this point. We will try an opioid antagonist for her constipation. We will likely need to get insurance preapproval for this. Otherwise I will see her back in 4 months.    1. PAH, WHO 1, WHO2,  WHO 3 possible  - cont ambrisentan 10 daily  - cont tidalifil 40 daily  - cont lasix    2. ASD s/p Closure    - clinical f/u    3. Aortic Stenosis, mild     - clinical f/u    4. Constipation    - try linaclotide 145 mg daily    - cont miralaz    Thank for you allowing me to take part in your patient's care, please call should you have any questions or would like to discuss this patient.      Bernarda Reyes D.O.  [address]  [address]  [place] [address]  VIA In Basket

## 2017-04-04 ENCOUNTER — TELEPHONE (OUTPATIENT)
Dept: MEDICAL GROUP | Facility: PHYSICIAN GROUP | Age: 57
End: 2017-04-04

## 2017-04-04 DIAGNOSIS — Z98.84 HX OF GASTRIC BYPASS: ICD-10-CM

## 2017-04-04 NOTE — TELEPHONE ENCOUNTER
Pt called and she needs a referral to plastic surgery at [facility] for Dr Annamarie Dang  This is for follow up for bariatric surgery.   Please send as URGENT.

## 2017-04-05 ENCOUNTER — HOSPITAL ENCOUNTER (OUTPATIENT)
Dept: LAB | Facility: MEDICAL CENTER | Age: 57
End: 2017-04-05
Attending: INTERNAL MEDICINE
Payer: MEDICARE

## 2017-04-05 LAB
ALBUMIN SERPL BCP-MCNC: 4.2 G/DL (ref 3.2–4.9)
ALBUMIN/GLOB SERPL: 1.7 G/DL
ALP SERPL-CCNC: 30 U/L (ref 30–99)
ALT SERPL-CCNC: 15 U/L (ref 2–50)
ANION GAP SERPL CALC-SCNC: 8 MMOL/L (ref 0–11.9)
AST SERPL-CCNC: 18 U/L (ref 12–45)
BASOPHILS # BLD AUTO: 0.7 % (ref 0–1.8)
BASOPHILS # BLD: 0.03 K/UL (ref 0–0.12)
BILIRUB SERPL-MCNC: 0.4 MG/DL (ref 0.1–1.5)
BUN SERPL-MCNC: 24 MG/DL (ref 8–22)
CALCIUM SERPL-MCNC: 9.4 MG/DL (ref 8.5–10.5)
CHLORIDE SERPL-SCNC: 102 MMOL/L (ref 96–112)
CO2 SERPL-SCNC: 28 MMOL/L (ref 20–33)
CREAT SERPL-MCNC: 1.02 MG/DL (ref 0.5–1.4)
EOSINOPHIL # BLD AUTO: 0.15 K/UL (ref 0–0.51)
EOSINOPHIL NFR BLD: 3.6 % (ref 0–6.9)
ERYTHROCYTE [DISTWIDTH] IN BLOOD BY AUTOMATED COUNT: 47.7 FL (ref 35.9–50)
GFR SERPL CREATININE-BSD FRML MDRD: 56 ML/MIN/1.73 M 2
GGT SERPL-CCNC: 11 U/L (ref 7–34)
GLOBULIN SER CALC-MCNC: 2.5 G/DL (ref 1.9–3.5)
GLUCOSE SERPL-MCNC: 128 MG/DL (ref 65–99)
HCT VFR BLD AUTO: 38.8 % (ref 37–47)
HGB BLD-MCNC: 12.6 G/DL (ref 12–16)
IMM GRANULOCYTES # BLD AUTO: 0.03 K/UL (ref 0–0.11)
IMM GRANULOCYTES NFR BLD AUTO: 0.7 % (ref 0–0.9)
LYMPHOCYTES # BLD AUTO: 1.16 K/UL (ref 1–4.8)
LYMPHOCYTES NFR BLD: 27.6 % (ref 22–41)
MAGNESIUM SERPL-MCNC: 2.1 MG/DL (ref 1.5–2.5)
MCH RBC QN AUTO: 28.5 PG (ref 27–33)
MCHC RBC AUTO-ENTMCNC: 32.5 G/DL (ref 33.6–35)
MCV RBC AUTO: 87.8 FL (ref 81.4–97.8)
MONOCYTES # BLD AUTO: 0.37 K/UL (ref 0–0.85)
MONOCYTES NFR BLD AUTO: 8.8 % (ref 0–13.4)
NEUTROPHILS # BLD AUTO: 2.46 K/UL (ref 2–7.15)
NEUTROPHILS NFR BLD: 58.6 % (ref 44–72)
NRBC # BLD AUTO: 0 K/UL
NRBC BLD AUTO-RTO: 0 /100 WBC
PLATELET # BLD AUTO: 118 K/UL (ref 164–446)
PMV BLD AUTO: 9.2 FL (ref 9–12.9)
POTASSIUM SERPL-SCNC: 4 MMOL/L (ref 3.6–5.5)
PROT SERPL-MCNC: 6.7 G/DL (ref 6–8.2)
RBC # BLD AUTO: 4.42 M/UL (ref 4.2–5.4)
SODIUM SERPL-SCNC: 138 MMOL/L (ref 135–145)
WBC # BLD AUTO: 4.2 K/UL (ref 4.8–10.8)

## 2017-04-05 PROCEDURE — 80053 COMPREHEN METABOLIC PANEL: CPT

## 2017-04-05 PROCEDURE — 36415 COLL VENOUS BLD VENIPUNCTURE: CPT

## 2017-04-05 PROCEDURE — 82977 ASSAY OF GGT: CPT

## 2017-04-05 PROCEDURE — 80197 ASSAY OF TACROLIMUS: CPT

## 2017-04-05 PROCEDURE — 83735 ASSAY OF MAGNESIUM: CPT

## 2017-04-05 PROCEDURE — 85025 COMPLETE CBC W/AUTO DIFF WBC: CPT

## 2017-04-06 LAB
IGA SERPL-MCNC: 81 MG/DL (ref 68–408)
IGG SERPL-MCNC: 672 MG/DL (ref 768–1632)
IGM SERPL-MCNC: 50 MG/DL (ref 35–263)
TACROLIMUS BLD-MCNC: 3.1 NG/ML

## 2017-04-07 LAB
ALBUMIN 24H UR QL ELPH: DETECTED
ALBUMIN SERPL-MCNC: 4.77 G/DL (ref 3.75–5.01)
ALPHA1 GLOB 24H UR QL ELPH: DETECTED
ALPHA1 GLOB SERPL ELPH-MCNC: 0.28 G/DL (ref 0.19–0.46)
ALPHA2 GLOB 24H UR QL ELPH: DETECTED
ALPHA2 GLOB SERPL ELPH-MCNC: 0.58 G/DL (ref 0.48–1.05)
B-GLOBULIN SERPL ELPH-MCNC: 0.64 G/DL (ref 0.48–1.1)
B-GLOBULIN UR QL ELPH: DETECTED
COLLECT DURATION TIME SPEC: NORMAL H
DEPRECATED KAPPA LC FREE/LAMBDA SER: 1.96 {RATIO} (ref 0.26–1.65)
EER PROT ELECT SER Q1092: NORMAL
GAMMA GLOB SERPL ELPH-MCNC: 0.63 G/DL (ref 0.62–1.51)
GAMMA GLOB UR QL ELPH: DETECTED
INTERPRETATION SERPL IFE-IMP: NORMAL
INTERPRETATION UR IFE-IMP: NORMAL
KAPPA LC FREE SER-MCNC: 2.35 MG/DL (ref 0.33–1.94)
KAPPA LC FREE UR-MCNC: 0.17 MG/DL (ref 0.14–2.42)
KAPPA LC FREE/LAMBDA FREE UR: NORMAL RATIO (ref 2.04–10.37)
LAMBDA LC FREE SERPL-MCNC: 1.2 MG/DL (ref 0.57–2.63)
LAMBDA LC FREE UR-MCNC: NORMAL MG/DL (ref 0.02–0.67)
PROT 24H UR-MRATE: NORMAL MG/D (ref 10–140)
PROT SERPL-MCNC: 6.9 G/DL (ref 6–8.3)
TOTAL VOLUME 1105: NORMAL ML

## 2017-04-12 ENCOUNTER — OFFICE VISIT (OUTPATIENT)
Dept: MEDICAL GROUP | Facility: PHYSICIAN GROUP | Age: 57
End: 2017-04-12
Payer: MEDICARE

## 2017-04-12 VITALS
SYSTOLIC BLOOD PRESSURE: 120 MMHG | DIASTOLIC BLOOD PRESSURE: 64 MMHG | WEIGHT: 174 LBS | BODY MASS INDEX: 26.37 KG/M2 | TEMPERATURE: 97.5 F | RESPIRATION RATE: 16 BRPM | HEART RATE: 83 BPM | OXYGEN SATURATION: 91 % | HEIGHT: 68 IN

## 2017-04-12 DIAGNOSIS — K59.03 DRUG-INDUCED CONSTIPATION: ICD-10-CM

## 2017-04-12 DIAGNOSIS — Z79.891 CHRONIC USE OF OPIATE DRUGS THERAPEUTIC PURPOSES: ICD-10-CM

## 2017-04-12 DIAGNOSIS — M54.6 CHRONIC BILATERAL THORACIC BACK PAIN: ICD-10-CM

## 2017-04-12 DIAGNOSIS — Z23 NEED FOR VACCINATION: ICD-10-CM

## 2017-04-12 DIAGNOSIS — E61.1 IRON DEFICIENCY: ICD-10-CM

## 2017-04-12 DIAGNOSIS — G89.4 CHRONIC PAIN SYNDROME: ICD-10-CM

## 2017-04-12 DIAGNOSIS — F41.9 ANXIETY: ICD-10-CM

## 2017-04-12 DIAGNOSIS — Z79.899 CHRONIC PRESCRIPTION BENZODIAZEPINE USE: ICD-10-CM

## 2017-04-12 DIAGNOSIS — G89.29 CHRONIC BILATERAL THORACIC BACK PAIN: ICD-10-CM

## 2017-04-12 PROBLEM — F11.20 OPIOID DEPENDENCE (HCC): Status: RESOLVED | Noted: 2017-03-22 | Resolved: 2017-04-12

## 2017-04-12 PROBLEM — E87.6 HYPOKALEMIA: Status: RESOLVED | Noted: 2017-03-23 | Resolved: 2017-04-12

## 2017-04-12 PROBLEM — E11.9 TYPE 2 DIABETES MELLITUS (HCC): Status: RESOLVED | Noted: 2017-03-21 | Resolved: 2017-04-12

## 2017-04-12 PROBLEM — R11.2 NAUSEA & VOMITING: Status: RESOLVED | Noted: 2017-01-16 | Resolved: 2017-04-12

## 2017-04-12 PROBLEM — F22 DELUSIONAL DISORDER (HCC): Status: RESOLVED | Noted: 2017-03-22 | Resolved: 2017-04-12

## 2017-04-12 PROBLEM — K59.00 OBSTIPATION: Status: RESOLVED | Noted: 2017-03-23 | Resolved: 2017-04-12

## 2017-04-12 PROBLEM — A41.9 SEPSIS (HCC): Status: RESOLVED | Noted: 2017-03-21 | Resolved: 2017-04-12

## 2017-04-12 PROBLEM — K56.609 SBO (SMALL BOWEL OBSTRUCTION) (HCC): Status: RESOLVED | Noted: 2017-01-03 | Resolved: 2017-04-12

## 2017-04-12 PROBLEM — R51.9 HEADACHE: Status: RESOLVED | Noted: 2017-03-22 | Resolved: 2017-04-12

## 2017-04-12 PROBLEM — I10 HTN (HYPERTENSION): Status: RESOLVED | Noted: 2017-03-22 | Resolved: 2017-04-12

## 2017-04-12 PROCEDURE — 90715 TDAP VACCINE 7 YRS/> IM: CPT | Performed by: FAMILY MEDICINE

## 2017-04-12 PROCEDURE — G8419 CALC BMI OUT NRM PARAM NOF/U: HCPCS | Performed by: FAMILY MEDICINE

## 2017-04-12 PROCEDURE — 1111F DSCHRG MED/CURRENT MED MERGE: CPT | Performed by: FAMILY MEDICINE

## 2017-04-12 PROCEDURE — G8432 DEP SCR NOT DOC, RNG: HCPCS | Performed by: FAMILY MEDICINE

## 2017-04-12 PROCEDURE — 99214 OFFICE O/P EST MOD 30 MIN: CPT | Mod: 25 | Performed by: FAMILY MEDICINE

## 2017-04-12 PROCEDURE — 3014F SCREEN MAMMO DOC REV: CPT | Performed by: FAMILY MEDICINE

## 2017-04-12 PROCEDURE — 90471 IMMUNIZATION ADMIN: CPT | Performed by: FAMILY MEDICINE

## 2017-04-12 PROCEDURE — 1036F TOBACCO NON-USER: CPT | Performed by: FAMILY MEDICINE

## 2017-04-12 RX ORDER — ALPRAZOLAM 0.5 MG/1
0.5 TABLET ORAL 3 TIMES DAILY
Qty: 90 TAB | Refills: 2 | Status: SHIPPED | OUTPATIENT
Start: 2017-04-12 | End: 2017-07-14 | Stop reason: SDUPTHER

## 2017-04-12 RX ORDER — OXYCODONE HYDROCHLORIDE 10 MG/1
10-30 TABLET ORAL EVERY 6 HOURS PRN
Qty: 90 TAB | Refills: 0 | Status: SHIPPED | OUTPATIENT
Start: 2017-04-12 | End: 2017-04-12 | Stop reason: SDUPTHER

## 2017-04-12 RX ORDER — OXYCODONE HYDROCHLORIDE 10 MG/1
10-30 TABLET ORAL EVERY 6 HOURS PRN
Qty: 90 TAB | Refills: 0 | Status: SHIPPED | OUTPATIENT
Start: 2017-04-12 | End: 2017-07-14 | Stop reason: SDUPTHER

## 2017-04-12 ASSESSMENT — ENCOUNTER SYMPTOMS: DEPRESSION: 0

## 2017-04-12 ASSESSMENT — LIFESTYLE VARIABLES: HISTORY_ALCOHOL_USE: 0

## 2017-04-12 NOTE — MR AVS SNAPSHOT
"        Roxana Cuba   2017 10:00 AM   Office Visit   MRN: [id number]    Department:  [facility]   Dept Phone:  [phone]    Description:  Female : 1960   Provider:  Bernarda Reyes D.O.           Reason for Visit     Medication Refill           Allergies as of 2017     Allergen Noted Reactions    Rhubarb 2009   Anaphylaxis    Vancomycin Hcl 2016       Causes red man syndrome when infused to fast        You were diagnosed with     Chronic use of opiate drugs therapeutic purposes   [1728249]       Chronic prescription benzodiazepine use   [4527903]       Chronic bilateral thoracic back pain   [1830065]       Iron deficiency   [995746]       Chronic pain syndrome   [338.4.ICD-9-CM]       Drug-induced constipation   [707531]       Need for vaccination   [307184]         Vital Signs     Blood Pressure Pulse Temperature Respirations Height Weight    120/64 mmHg 83 36.4 °C (97.5 °F) 16 1.727 m (5' 7.99\") 78.926 kg (174 lb)    Body Mass Index Oxygen Saturation Last Menstrual Period Smoking Status          26.46 kg/m2 91% 2000 Passive Smoke Exposure - Never Smoker        Basic Information     Date Of Birth Sex Race Ethnicity Preferred Language    1960 Female White Non- English      Your appointments     May 10, 2017  8:00 AM   Adult Draw/Collection with [facility]   [facility] (--)    [address] [place] [address]   [phone]            2017  8:00 AM   Adult Draw/Collection with LAB [facility]   [facility] (--)    [address] [place] [address]   [phone]            2017 10:00 AM   Established Patient with Bernarda Reyes D.O.   [facility])    [address], [address]  [place] [address] [phone]           You will be receiving a confirmation call a few days before your appointment from our automated call confirmation system.              Problem List              ICD-10-CM Priority Class " Noted - Resolved    Status post liver transplant Dr. [place] ([facility]) Z94.4 Medium  3/13/2012 - Present    Hypothyroidism, adult E03.9 Low  3/13/2012 - Present    History of pancreatitis Z87.19 Medium  6/20/2012 - Present    H/O non-ST elevation myocardial infarction (NSTEMI) I25.2   5/16/2013 - Present    Lower extremity weakness M62.81 Low  6/26/2013 - Present    Anemia D64.9 Medium  9/13/2013 - Present    Anxiety F41.9 Low  11/4/2013 - Present    Insomnia G47.00 Low  11/4/2013 - Present    Sarcoidosis (CMS-HCC) D86.9 Medium  11/14/2013 - Present    COPD (chronic obstructive pulmonary disease) (CMS-HCC) J44.9 High  11/14/2013 - Present    MGUS (monoclonal gammopathy of unknown significance) D47.2 Medium  11/14/2013 - Present    CKD (chronic kidney disease) stage 3, GFR 30-59 ml/min- unclear cause, probably multifactorial N18.3 Medium  2/25/2014 - Present    Hepatopulmonary syndrome (CMS-HCC) K76.81 Medium  3/5/2014 - Present    Ostium secundum type atrial septal defect, S/P percutaneous closure Q21.1 Medium  3/17/2014 - Present    Mild aortic regurgitation and aortic valve sclerosis I35.1 High  3/17/2014 - Present    Vitamin D deficiency E55.9 Medium  8/26/2014 - Present    Chronic respiratory failure (CMS-HCC) J96.10 Medium  11/13/2014 - Present    Pure hypercholesterolemia E78.00 High  12/9/2014 - Present    Pulmonary hypertension (CMS-HCC) I27.2 High  2/3/2015 - Present    On prednisone therapy Z79.52   7/6/2015 - Present    Mild mitral regurgitation I34.0   7/14/2015 - Present    Splenomegaly R16.1   7/14/2015 - Present    Immunocompromised state (CMS-HCC) D84.9   11/14/2015 - Present    Splenic lesion D73.9   11/14/2015 - Present    Hx of gastric bypass Z98.890   1/6/2016 - Present    Back pain M54.9   2/22/2016 - Present    Long term prescription benzodiazepine use Z79.899   4/12/2016 - Present    Chronic pain syndrome G89.4   6/15/2016 - Present    Other allergic rhinitis J30.89   7/22/2016 - Present     Thrombocytopenia (CMS-HCC) D69.6   8/15/2016 - Present    History of lung biopsy Z98.890   10/17/2016 - Present    GERD (gastroesophageal reflux disease) K21.9   1/4/2017 - Present    Iron deficiency E61.1   1/20/2017 - Present    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   2/21/2017 - Present    CAP (community acquired pneumonia) J18.9   3/21/2017 - Present    Liver transplanted (CMS-HCC) Z94.4   3/21/2017 - Present    Mood disorder (CMS-HCC) F39   3/22/2017 - Present    Drug-induced constipation K59.03   4/3/2017 - Present    Chronic prescription benzodiazepine use Z79.899   4/12/2017 - Present    Chronic use of opiate drugs therapeutic purposes Z79.899   4/12/2017 - Present      Health Maintenance        Date Due Completion Dates    IMM HEP B VACCINE (1 of 3 - Primary Series) 1960 ---    IMM DTaP/Tdap/Td Vaccine (1 - Tdap) 2/12/1979 ---    RETINAL SCREENING 4/1/2017 4/1/2016, 1/12/2015    DIABETES MONOFILAMENT / LE EXAM 2/13/2018 (Originally 1960) ---    A1C SCREENING 7/11/2017 1/11/2017, 10/19/2016, 6/3/2016, 3/2/2016, 10/16/2015, 6/2/2015, 5/19/2015, 5/6/2014, 4/1/2014, 11/14/2013, 3/26/2013, 3/22/2013, 3/5/2013, 9/12/2012, 5/20/2012, 4/9/2012, 3/19/2012, 2/12/2012, 2/5/2012    FASTING LIPID PROFILE 11/23/2017 11/23/2016, 8/23/2016, 7/5/2016, 6/3/2016, 6/3/2016, 3/2/2016, 12/7/2015, 10/16/2015, 6/2/2015, 3/2/2015, 3/1/2015, 12/22/2014, 3/5/2014, 3/19/2012, 3/3/2010, 8/5/2009, 7/29/2009    URINE ACR / MICROALBUMIN 1/11/2018 1/11/2017, 3/19/2012    MAMMOGRAM 2/1/2018 2/1/2017, 12/13/2016, 11/2/2015, 10/24/2014, 10/24/2014, 10/20/2014, 8/4/2013 (Done)    Override on 8/4/2013: Done ([facility])    SERUM CREATININE 4/5/2018 4/5/2017, 3/26/2017, 3/24/2017, 3/22/2017, 3/21/2017, 3/20/2017, 3/19/2017, 3/8/2017, 2/15/2017, 2/15/2017, 1/17/2017, 1/16/2017, 1/14/2017, 1/11/2017, 1/11/2017, 1/11/2017, 1/5/2017, 1/4/2017, 1/3/2017, 12/21/2016, 12/7/2016, 11/23/2016, 11/23/2016, 10/26/2016,  10/19/2016, 10/19/2016, 10/12/2016, 10/7/2016, 9/6/2016, 9/5/2016, 9/5/2016, 9/4/2016, 9/3/2016, 9/2/2016, 9/2/2016, 9/1/2016, 8/23/2016, 8/23/2016, 8/20/2016, 8/16/2016, 8/15/2016, 8/14/2016, 8/2/2016, 7/22/2016, 7/19/2016, 7/18/2016, 7/17/2016, 7/16/2016, 7/15/2016, 7/14/2016, 7/13/2016, 7/12/2016, 7/11/2016, 7/10/2016, 7/5/2016, 7/5/2016, 6/15/2016, 6/14/2016, 6/3/2016, 6/3/2016, 6/3/2016, 5/24/2016, 5/23/2016, 5/14/2016, 5/11/2016, 5/10/2016, 5/9/2016, 5/8/2016, 5/7/2016, 5/6/2016, 5/5/2016, 5/4/2016, 5/3/2016, 5/2/2016, 5/1/2016, 4/30/2016, 4/29/2016, 4/6/2016, 4/6/2016, 3/2/2016, 3/2/2016, 3/2/2016, 2/26/2016, 2/25/2016, 2/24/2016, 2/23/2016, 2/22/2016, 2/19/2016, 2/3/2016, 1/5/2016, 12/7/2015, 12/1/2015, 11/30/2015, 11/24/2015, 11/17/2015, 11/16/2015, 11/15/2015, 11/13/2015, 11/5/2015, 10/16/2015, 10/7/2015, 9/8/2015, 8/25/2015, 8/21/2015, 8/19/2015, 8/12/2015, 7/28/2015, 6/30/2015, 6/16/2015, 6/2/2015, 5/19/2015, 5/5/2015, 4/21/2015, 4/7/2015, 3/22/2015, 3/18/2015, 3/17/2015, 3/12/2015, 3/9/2015, 3/4/2015, 3/2/2015, 3/1/2015, 2/27/2015, 2/17/2015, 2/3/2015, 1/20/2015, 1/6/2015, 12/22/2014, 12/19/2014, 12/9/2014, 11/25/2014, 11/18/2014, 11/17/2014, 11/16/2014, 11/14/2014, 11/13/2014, 11/10/2014, 10/28/2014, 10/14/2014, 9/29/2014, 9/16/2014, 9/2/2014, 8/19/2014, 8/5/2014, 7/21/2014, 7/8/2014, 6/24/2014, 6/16/2014, 6/11/2014, 6/3/2014, 5/20/2014, 5/6/2014, 5/4/2014, 5/2/2014, 4/28/2014, 4/27/2014, 4/25/2014, 4/22/2014, 3/21/2014, 3/17/2014, 3/5/2014, 3/4/2014, 2/25/2014, 2/24/2014, 2/22/2014, 2/18/2014, 1/27/2014, 1/15/2014, 1/7/2014, 1/7/2014, 1/4/2014, 1/3/2014, 12/9/2013, 11/23/2013, 11/22/2013, 11/21/2013, 11/20/2013, 11/18/2013, 11/15/2013, 11/14/2013, 11/13/2013, 11/4/2013, 10/28/2013, 10/4/2013, 10/1/2013, 9/30/2013, 9/29/2013, 9/27/2013, 9/19/2013, 9/18/2013, 9/16/2013, 9/15/2013, 9/14/2013, 9/12/2013, 9/3/2013, 8/16/2013, 8/6/2013, 8/2/2013, 8/1/2013    COLONOSCOPY 3/27/2027 3/27/2017, 9/20/2010              Current Immunizations     13-VALENT PCV PREVNAR 8/21/2016  9:40 AM, 8/21/2016    Influenza TIV (IM) 9/28/2015, 10/14/2014, 9/16/2013 11:43 PM, 9/27/2012 11:57 PM, 10/1/2011    Influenza Vaccine Adult HD 10/1/2016    Influenza Vaccine Quad Inj (Preserved) 9/28/2015, 10/1/2014, 9/16/2013, 9/27/2012, 10/10/2011, 10/19/2010    Pneumococcal polysaccharide vaccine (PPSV-23) 1/4/2017  6:09 AM, 1/4/2017, 10/8/2009  4:25 PM, 10/8/2009, 7/30/2009, 7/30/2009    Tdap Vaccine  Incomplete      Below and/or attached are the medications your provider expects you to take. Review all of your home medications and newly ordered medications with your provider and/or pharmacist. Follow medication instructions as directed by your provider and/or pharmacist. Please keep your medication list with you and share with your provider. Update the information when medications are discontinued, doses are changed, or new medications (including over-the-counter products) are added; and carry medication information at all times in the event of emergency situations     Allergies:  RHUBARB - Anaphylaxis     VANCOMYCIN HCL - (reactions not documented)               Medications  Valid as of: April 12, 2017 - 10:46 AM    Generic Name Brand Name Tablet Size Instructions for use    ALPRAZolam (Tab) XANAX 0.5 MG Take 1 Tab by mouth 3 times a day.        Ambrisentan (Tab) LETAIRIS 10 MG Take 1 Tab by mouth every day.        Ascorbic Acid (Tab) ascorbic acid 500 MG Take 500 mg by mouth every day.        Aspirin (Tablet Delayed Response) ECOTRIN 81 MG Take 1 Tab by mouth every morning.        Calcium Citrate-Vitamin D (Tab) CITRACAL MAXIMUM 315-250 MG-UNIT TAKE 2 TABS BY MOUTH 2X/ DAY WITH FOOD BEFORE AND AFTER DINNER FOR LIFE-CRUSH 1ST 3 MONTH, SPACE MVI        Cyclobenzaprine HCl (Tab) FLEXERIL 10 MG Take 1 Tab by mouth 2 times a day as needed.        Docusate Sodium (Cap) COLACE 100 MG Take 100 mg by mouth 2 times a day.        Ferrous Sulfate (Elixir)  FEOSOL 220 (44 FE) MG/5ML Take 5 mL by mouth every day.        Fluticasone Propionate (Suspension) FLONASE 50 MCG/ACT Spray 1 Spray in nose every day.        Furosemide (Tab) LASIX 40 MG Take 40 mg by mouth every morning.        Gabapentin (Tab) NEURONTIN 600 MG Take 1 Tab by mouth 3 times a day.        Levothyroxine Sodium (Tab) SYNTHROID 137 MCG Take 1 Tab by mouth every day.        Linaclotide (Cap) Linaclotide 145 MCG Take 1 Tab by mouth 1 time daily as needed.        Linaclotide (Cap) Linaclotide 290 MCG Take 290 mg by mouth every day.        Mycophenolate Mofetil (Cap) CELLCEPT 250 MG Take 500 mg by mouth 2 times a day.        Omeprazole (CAPSULE DELAYED RELEASE) PRILOSEC 40 MG Take 1 Cap by mouth every day.        Ondansetron (TABLET DISPERSIBLE) ZOFRAN ODT 4 MG Take 1 Tab by mouth every 8 hours as needed for Nausea/Vomiting. Nausea and vomiting        OxyCODONE HCl (Tab) ROXICODONE 10 MG Take 1-3 Tabs by mouth every 6 hours as needed for Moderate Pain.        Polyethyl Glycol-Propyl Glycol   1 Drop by Ophthalmic route 3 times a day.        Polyethylene Glycol 3350 (Pack) MIRALAX  Take 17 g by mouth every day.        Pravastatin Sodium (Tab) PRAVACHOL 20 MG Take 1 Tab by mouth every day.        PredniSONE (Tab) DELTASONE 5 MG Take 7 mg by mouth every morning.        PredniSONE (Tab) DELTASONE 1 MG         Probiotic Product   Take 1 Cap by mouth every day.        Sennosides (Tab) SENOKOT 8.6 MG Take 17.2 mg by mouth 2 times a day.        Sertraline HCl (Tab) ZOLOFT 100 MG Take 100 mg by mouth every bedtime.        Tacrolimus (Cap) PROGRAF 0.5 MG Take 0.5-2 mg by mouth 2 times a day. 2 mg at 0800  1.5 mg at 2000        Tadalafil (PAH) (Tab) Tadalafil (PAH) 20 MG Take 40 mg by mouth every bedtime. Indications: Pulmonary Arterial Hypertension        Tiotropium Bromide Monohydrate (Cap) SPIRIVA 18 MCG Inhale 1 Cap by mouth every day.        TraZODone HCl (Tab) DESYREL 50 MG Take 1-2 Tabs by mouth at bedtime as  needed for Sleep.        .                 Medicines prescribed today were sent to:     [id number] - [place], [place] - [address]    [address] [place] [address]    Phone: [phone] Fax: [phone]    Open 24 Hours?: No    [facility] [place], [place] - [address]    [address] [facility] [address]    Phone: [phone] Fax: [phone]    Open 24 Hours?: No      Medication refill instructions:       If your prescription bottle indicates you have medication refills left, it is not necessary to call your provider’s office. Please contact your pharmacy and they will refill your medication.    If your prescription bottle indicates you do not have any refills left, you may request refills at any time through one of the following ways: The online [employer] system (except Urgent Care), by calling your provider’s office, or by asking your pharmacy to contact your provider’s office with a refill request. Medication refills are processed only during regular business hours and may not be available until the next business day. Your provider may request additional information or to have a follow-up visit with you prior to refilling your medication.   *Please Note: Medication refills are assigned a new Rx number when refilled electronically. Your pharmacy may indicate that no refills were authorized even though a new prescription for the same medication is available at the pharmacy. Please request the medicine by name with the pharmacy before contacting your provider for a refill.        Your To Do List     Future Labs/Procedures Complete By [employer] PAIN MANAGEMENT SCREEN  As directed 4/12/2018    Comments:    Current Meds (name, sig, last dose):   Current outpatient prescriptions:   •  ferrous sulfate, 220 mg, Oral, DAILY  •  oxycodone immediate release, 10-30 mg, Oral, Q6HRS PRN  •  alprazolam, 0.5 mg, Oral, TID  •  Linaclotide, 290 mg, Oral,  DAILY  •  predniSONE, , 4/12/2017  •  CITRACAL MAXIMUM, TAKE 2 TABS BY MOUTH 2X/ DAY WITH FOOD BEFORE AND AFTER DINNER FOR LIFE-CRUSH 1ST 3 MONTH, SPACE MVI, 4/12/2017  •  tacrolimus, 0.5-2 mg, Oral, BID, 4/12/2017 at 0800  •  ascorbic acid, 500 mg, Oral, DAILY, 4/12/2017 at 0700  •  Polyethyl Glycol-Propyl Glycol (SYSTANE OP), 1 Drop, Ophthalmic, TID, 4/12/2017 at 0800  •  sennosides, 17.2 mg, Oral, BID, 4/12/2017 at 0700  •  Probiotic Product (PROBIOTIC DAILY PO), 1 Cap, Oral, DAILY, 4/12/2017 at 1800  •  polyethylene glycol/lytes, 17 g, Oral, DAILY, 4/12/2017 at 0800  •  aspirin EC, 81 mg, Oral, QAM, 4/12/2017 at 0700  •  gabapentin, 600 mg, Oral, TID, 4/12/2017 at 2000  •  levothyroxine, 137 mcg, Oral, QDAY, 4/12/2017 at 0800  •  omeprazole, 40 mg, Oral, DAILY, 4/12/2017 at 0800  •  ondansetron, 4 mg, Oral, Q8HRS PRN, 4/12/2017 at 0900  •  tiotropium, 18 mcg, Inhalation, DAILY, 4/12/2017 at 0800  •  fluticasone, 1 Spray, Nasal, DAILY, 4/12/2017 at 2000  •  predniSONE, 7 mg, Oral, QAM, 4/12/2017 at 0700  •  sertraline, 100 mg, Oral, QHS, 4/12/2017 at 2000  •  ambrisentan, 10 mg, Oral, DAILY, 4/12/2017 at 1200  •  docusate sodium, 100 mg, Oral, BID, 4/12/2017 at 0700  •  mycophenolate, 500 mg, Oral, BID, 4/12/2017 at 0800  •  furosemide, 40 mg, Oral, QAM, 4/12/2017 at 0900  •  Tadalafil (PAH), 40 mg, Oral, QHS, 4/12/2017 at 1200  •  pravastatin, 20 mg, Oral, DAILY, 4/12/2017 at 0900  •  Linaclotide, 1 Tab, Oral, QDAY PRN  •  cyclobenzaprine, 10 mg, Oral, BID PRN (Patient taking differently: 10 mg, Oral, 3 TIMES DAILY PRN), prn at prn  •  trazodone,  mg, Oral, QHS PRN, prn at 2000         MyChart Access Code: Activation code not generated  Current MyChart Status: Active

## 2017-04-19 ENCOUNTER — TELEPHONE (OUTPATIENT)
Dept: CARDIOLOGY | Facility: MEDICAL CENTER | Age: 57
End: 2017-04-19

## 2017-04-27 ENCOUNTER — PATIENT OUTREACH (OUTPATIENT)
Dept: HEALTH INFORMATION MANAGEMENT | Facility: OTHER | Age: 57
End: 2017-04-27

## 2017-05-10 ENCOUNTER — HOSPITAL ENCOUNTER (OUTPATIENT)
Dept: LAB | Facility: MEDICAL CENTER | Age: 57
End: 2017-05-10
Attending: INTERNAL MEDICINE
Payer: MEDICARE

## 2017-05-10 LAB
25(OH)D3 SERPL-MCNC: 38 NG/ML (ref 30–100)
ALBUMIN SERPL BCP-MCNC: 4.4 G/DL (ref 3.2–4.9)
ALBUMIN SERPL BCP-MCNC: 4.6 G/DL (ref 3.2–4.9)
ALBUMIN/GLOB SERPL: 2.1 G/DL
ALBUMIN/GLOB SERPL: 2.6 G/DL
ALP SERPL-CCNC: 28 U/L (ref 30–99)
ALP SERPL-CCNC: 28 U/L (ref 30–99)
ALT SERPL-CCNC: 14 U/L (ref 2–50)
ALT SERPL-CCNC: 14 U/L (ref 2–50)
ANION GAP SERPL CALC-SCNC: 8 MMOL/L (ref 0–11.9)
ANION GAP SERPL CALC-SCNC: 9 MMOL/L (ref 0–11.9)
APPEARANCE UR: CLEAR
AST SERPL-CCNC: 20 U/L (ref 12–45)
AST SERPL-CCNC: 20 U/L (ref 12–45)
BACTERIA #/AREA URNS HPF: ABNORMAL /HPF
BASOPHILS # BLD AUTO: 0.6 % (ref 0–1.8)
BASOPHILS # BLD AUTO: 0.6 % (ref 0–1.8)
BASOPHILS # BLD: 0.02 K/UL (ref 0–0.12)
BASOPHILS # BLD: 0.02 K/UL (ref 0–0.12)
BILIRUB SERPL-MCNC: 0.4 MG/DL (ref 0.1–1.5)
BILIRUB SERPL-MCNC: 0.4 MG/DL (ref 0.1–1.5)
BILIRUB UR QL STRIP.AUTO: NEGATIVE
BUN SERPL-MCNC: 22 MG/DL (ref 8–22)
BUN SERPL-MCNC: 22 MG/DL (ref 8–22)
CALCIUM SERPL-MCNC: 9.5 MG/DL (ref 8.5–10.5)
CALCIUM SERPL-MCNC: 9.6 MG/DL (ref 8.5–10.5)
CHLORIDE SERPL-SCNC: 104 MMOL/L (ref 96–112)
CHLORIDE SERPL-SCNC: 104 MMOL/L (ref 96–112)
CO2 SERPL-SCNC: 31 MMOL/L (ref 20–33)
CO2 SERPL-SCNC: 31 MMOL/L (ref 20–33)
COLOR UR: YELLOW
CREAT SERPL-MCNC: 1.08 MG/DL (ref 0.5–1.4)
CREAT SERPL-MCNC: 1.08 MG/DL (ref 0.5–1.4)
CREAT UR-MCNC: 144.5 MG/DL
EOSINOPHIL # BLD AUTO: 0.11 K/UL (ref 0–0.51)
EOSINOPHIL # BLD AUTO: 0.14 K/UL (ref 0–0.51)
EOSINOPHIL NFR BLD: 3.1 % (ref 0–6.9)
EOSINOPHIL NFR BLD: 4 % (ref 0–6.9)
EPI CELLS #/AREA URNS HPF: ABNORMAL /HPF
ERYTHROCYTE [DISTWIDTH] IN BLOOD BY AUTOMATED COUNT: 48.1 FL (ref 35.9–50)
ERYTHROCYTE [DISTWIDTH] IN BLOOD BY AUTOMATED COUNT: 48.2 FL (ref 35.9–50)
FERRITIN SERPL-MCNC: 36 NG/ML (ref 10–291)
GFR SERPL CREATININE-BSD FRML MDRD: 52 ML/MIN/1.73 M 2
GFR SERPL CREATININE-BSD FRML MDRD: 52 ML/MIN/1.73 M 2
GGT SERPL-CCNC: 8 U/L (ref 7–34)
GLOBULIN SER CALC-MCNC: 1.8 G/DL (ref 1.9–3.5)
GLOBULIN SER CALC-MCNC: 2.1 G/DL (ref 1.9–3.5)
GLUCOSE SERPL-MCNC: 111 MG/DL (ref 65–99)
GLUCOSE SERPL-MCNC: 113 MG/DL (ref 65–99)
GLUCOSE UR STRIP.AUTO-MCNC: NEGATIVE MG/DL
HCT VFR BLD AUTO: 38 % (ref 37–47)
HCT VFR BLD AUTO: 38.4 % (ref 37–47)
HGB BLD-MCNC: 12.3 G/DL (ref 12–16)
HGB BLD-MCNC: 12.3 G/DL (ref 12–16)
IMM GRANULOCYTES # BLD AUTO: 0.01 K/UL (ref 0–0.11)
IMM GRANULOCYTES # BLD AUTO: 0.02 K/UL (ref 0–0.11)
IMM GRANULOCYTES NFR BLD AUTO: 0.3 % (ref 0–0.9)
IMM GRANULOCYTES NFR BLD AUTO: 0.6 % (ref 0–0.9)
IRON SATN MFR SERPL: 24 % (ref 15–55)
IRON SERPL-MCNC: 73 UG/DL (ref 40–170)
KETONES UR STRIP.AUTO-MCNC: NEGATIVE MG/DL
LEUKOCYTE ESTERASE UR QL STRIP.AUTO: ABNORMAL
LYMPHOCYTES # BLD AUTO: 0.88 K/UL (ref 1–4.8)
LYMPHOCYTES # BLD AUTO: 0.9 K/UL (ref 1–4.8)
LYMPHOCYTES NFR BLD: 25.3 % (ref 22–41)
LYMPHOCYTES NFR BLD: 25.4 % (ref 22–41)
MAGNESIUM SERPL-MCNC: 2.5 MG/DL (ref 1.5–2.5)
MAGNESIUM SERPL-MCNC: 2.5 MG/DL (ref 1.5–2.5)
MCH RBC QN AUTO: 28.9 PG (ref 27–33)
MCH RBC QN AUTO: 29.4 PG (ref 27–33)
MCHC RBC AUTO-ENTMCNC: 32 G/DL (ref 33.6–35)
MCHC RBC AUTO-ENTMCNC: 32.4 G/DL (ref 33.6–35)
MCV RBC AUTO: 90.1 FL (ref 81.4–97.8)
MCV RBC AUTO: 90.7 FL (ref 81.4–97.8)
MICRO URNS: ABNORMAL
MONOCYTES # BLD AUTO: 0.22 K/UL (ref 0–0.85)
MONOCYTES # BLD AUTO: 0.27 K/UL (ref 0–0.85)
MONOCYTES NFR BLD AUTO: 6.3 % (ref 0–13.4)
MONOCYTES NFR BLD AUTO: 7.6 % (ref 0–13.4)
NEUTROPHILS # BLD AUTO: 2.2 K/UL (ref 2–7.15)
NEUTROPHILS # BLD AUTO: 2.24 K/UL (ref 2–7.15)
NEUTROPHILS NFR BLD: 63 % (ref 44–72)
NEUTROPHILS NFR BLD: 63.2 % (ref 44–72)
NITRITE UR QL STRIP.AUTO: NEGATIVE
NRBC # BLD AUTO: 0 K/UL
NRBC # BLD AUTO: 0 K/UL
NRBC BLD AUTO-RTO: 0 /100 WBC
NRBC BLD AUTO-RTO: 0 /100 WBC
PH UR STRIP.AUTO: 7.5 [PH]
PHOSPHATE SERPL-MCNC: 3.9 MG/DL (ref 2.5–4.5)
PLATELET # BLD AUTO: 67 K/UL (ref 164–446)
PLATELET # BLD AUTO: 69 K/UL (ref 164–446)
PMV BLD AUTO: 9.8 FL (ref 9–12.9)
PMV BLD AUTO: 9.9 FL (ref 9–12.9)
POTASSIUM SERPL-SCNC: 4 MMOL/L (ref 3.6–5.5)
POTASSIUM SERPL-SCNC: 4.1 MMOL/L (ref 3.6–5.5)
PROT SERPL-MCNC: 6.4 G/DL (ref 6–8.2)
PROT SERPL-MCNC: 6.5 G/DL (ref 6–8.2)
PROT UR QL STRIP: NEGATIVE MG/DL
PROT UR-MCNC: 7.9 MG/DL (ref 0–15)
PROT/CREAT UR: 55 MG/G (ref 10–107)
PTH-INTACT SERPL-MCNC: 65.1 PG/ML (ref 14–72)
RBC # BLD AUTO: 4.19 M/UL (ref 4.2–5.4)
RBC # BLD AUTO: 4.26 M/UL (ref 4.2–5.4)
RBC # URNS HPF: ABNORMAL /HPF
RBC UR QL AUTO: NEGATIVE
SODIUM SERPL-SCNC: 143 MMOL/L (ref 135–145)
SODIUM SERPL-SCNC: 144 MMOL/L (ref 135–145)
SP GR UR STRIP.AUTO: 1.02
TIBC SERPL-MCNC: 309 UG/DL (ref 250–450)
URATE SERPL-MCNC: 5.9 MG/DL (ref 1.9–8.2)
WBC # BLD AUTO: 3.5 K/UL (ref 4.8–10.8)
WBC # BLD AUTO: 3.6 K/UL (ref 4.8–10.8)
WBC #/AREA URNS HPF: ABNORMAL /HPF

## 2017-05-10 PROCEDURE — 85025 COMPLETE CBC W/AUTO DIFF WBC: CPT

## 2017-05-10 PROCEDURE — 82977 ASSAY OF GGT: CPT

## 2017-05-10 PROCEDURE — 80053 COMPREHEN METABOLIC PANEL: CPT

## 2017-05-10 PROCEDURE — 83735 ASSAY OF MAGNESIUM: CPT

## 2017-05-10 PROCEDURE — 80197 ASSAY OF TACROLIMUS: CPT

## 2017-05-12 LAB — TACROLIMUS BLD-MCNC: 2.7 NG/ML

## 2017-05-23 DIAGNOSIS — Z98.84 HX OF GASTRIC BYPASS: ICD-10-CM

## 2017-06-01 DIAGNOSIS — J44.9 CHRONIC OBSTRUCTIVE PULMONARY DISEASE, UNSPECIFIED COPD TYPE (HCC): ICD-10-CM

## 2017-06-02 RX ORDER — PREDNISONE 5 MG/1
TABLET ORAL
Qty: 120 TAB | Refills: 5 | Status: SHIPPED | OUTPATIENT
Start: 2017-06-02 | End: 2017-06-22

## 2017-06-02 NOTE — TELEPHONE ENCOUNTER
Have we ever prescribed this med? Yes.  If yes, what date? Unknown    Last OV: 3/7/17    Next OV: 8/21/17    DX: COPD    Medications: predniSONE (DELTASONE) 5 MG Tab     PLEASE ADD CORRECT DOSE

## 2017-06-05 ENCOUNTER — HOSPITAL ENCOUNTER (OUTPATIENT)
Dept: LAB | Facility: MEDICAL CENTER | Age: 57
End: 2017-06-05
Attending: INTERNAL MEDICINE
Payer: MEDICARE

## 2017-06-05 ENCOUNTER — TELEPHONE (OUTPATIENT)
Dept: PULMONOLOGY | Facility: HOSPICE | Age: 57
End: 2017-06-05

## 2017-06-05 DIAGNOSIS — G47.33 OBSTRUCTIVE SLEEP APNEA: ICD-10-CM

## 2017-06-05 DIAGNOSIS — J44.9 CHRONIC OBSTRUCTIVE PULMONARY DISEASE, UNSPECIFIED COPD TYPE (HCC): ICD-10-CM

## 2017-06-05 LAB
ALBUMIN SERPL BCP-MCNC: 4.5 G/DL (ref 3.2–4.9)
ALBUMIN/GLOB SERPL: 2 G/DL
ALP SERPL-CCNC: 28 U/L (ref 30–99)
ALT SERPL-CCNC: 18 U/L (ref 2–50)
ANION GAP SERPL CALC-SCNC: 6 MMOL/L (ref 0–11.9)
AST SERPL-CCNC: 20 U/L (ref 12–45)
BASOPHILS # BLD AUTO: 0.5 % (ref 0–1.8)
BASOPHILS # BLD: 0.02 K/UL (ref 0–0.12)
BILIRUB SERPL-MCNC: 0.4 MG/DL (ref 0.1–1.5)
BUN SERPL-MCNC: 25 MG/DL (ref 8–22)
CALCIUM SERPL-MCNC: 9.7 MG/DL (ref 8.5–10.5)
CHLORIDE SERPL-SCNC: 100 MMOL/L (ref 96–112)
CO2 SERPL-SCNC: 35 MMOL/L (ref 20–33)
CREAT SERPL-MCNC: 1.1 MG/DL (ref 0.5–1.4)
EOSINOPHIL # BLD AUTO: 0.13 K/UL (ref 0–0.51)
EOSINOPHIL NFR BLD: 3.2 % (ref 0–6.9)
ERYTHROCYTE [DISTWIDTH] IN BLOOD BY AUTOMATED COUNT: 47 FL (ref 35.9–50)
GFR SERPL CREATININE-BSD FRML MDRD: 51 ML/MIN/1.73 M 2
GGT SERPL-CCNC: 9 U/L (ref 7–34)
GLOBULIN SER CALC-MCNC: 2.2 G/DL (ref 1.9–3.5)
GLUCOSE SERPL-MCNC: 93 MG/DL (ref 65–99)
HCT VFR BLD AUTO: 39.6 % (ref 37–47)
HGB BLD-MCNC: 12.6 G/DL (ref 12–16)
IMM GRANULOCYTES # BLD AUTO: 0.01 K/UL (ref 0–0.11)
IMM GRANULOCYTES NFR BLD AUTO: 0.2 % (ref 0–0.9)
LYMPHOCYTES # BLD AUTO: 1.26 K/UL (ref 1–4.8)
LYMPHOCYTES NFR BLD: 30.7 % (ref 22–41)
MAGNESIUM SERPL-MCNC: 2.2 MG/DL (ref 1.5–2.5)
MCH RBC QN AUTO: 29 PG (ref 27–33)
MCHC RBC AUTO-ENTMCNC: 31.8 G/DL (ref 33.6–35)
MCV RBC AUTO: 91.2 FL (ref 81.4–97.8)
MONOCYTES # BLD AUTO: 0.3 K/UL (ref 0–0.85)
MONOCYTES NFR BLD AUTO: 7.3 % (ref 0–13.4)
NEUTROPHILS # BLD AUTO: 2.39 K/UL (ref 2–7.15)
NEUTROPHILS NFR BLD: 58.1 % (ref 44–72)
NRBC # BLD AUTO: 0 K/UL
NRBC BLD AUTO-RTO: 0 /100 WBC
PLATELET # BLD AUTO: 79 K/UL (ref 164–446)
PMV BLD AUTO: 9.8 FL (ref 9–12.9)
POTASSIUM SERPL-SCNC: 4 MMOL/L (ref 3.6–5.5)
PROT SERPL-MCNC: 6.7 G/DL (ref 6–8.2)
RBC # BLD AUTO: 4.34 M/UL (ref 4.2–5.4)
SODIUM SERPL-SCNC: 141 MMOL/L (ref 135–145)
WBC # BLD AUTO: 4.1 K/UL (ref 4.8–10.8)

## 2017-06-05 PROCEDURE — 36415 COLL VENOUS BLD VENIPUNCTURE: CPT

## 2017-06-05 PROCEDURE — 85025 COMPLETE CBC W/AUTO DIFF WBC: CPT

## 2017-06-05 PROCEDURE — 83735 ASSAY OF MAGNESIUM: CPT

## 2017-06-05 PROCEDURE — 80053 COMPREHEN METABOLIC PANEL: CPT

## 2017-06-05 PROCEDURE — 80197 ASSAY OF TACROLIMUS: CPT

## 2017-06-05 PROCEDURE — 82977 ASSAY OF GGT: CPT

## 2017-06-05 RX ORDER — BLOOD SUGAR DIAGNOSTIC
STRIP MISCELLANEOUS
Qty: 400 STRIP | Refills: 3 | Status: SHIPPED | OUTPATIENT
Start: 2017-06-05 | End: 2017-06-22

## 2017-06-05 NOTE — TELEPHONE ENCOUNTER
Patient has recently been seen by PCP within the last 6 months per protocol (4/17). Will refill medications for 12 months.  Lab Results   Component Value Date/Time    GLYCOHEMOGLOBIN 5.2 01/11/2017 08:00 AM      Lab Results   Component Value Date/Time    MICRO ALB CREAT RATIO 9 01/11/2017 08:16 AM    MICROALBUMIN, URINE RANDOM 1.2 01/11/2017 08:16 AM      Lab Results   Component Value Date/Time    ALKALINE PHOSPHATASE 28* 05/10/2017 08:10 AM    AST(SGOT) 20 05/10/2017 08:10 AM    ALT(SGPT) 14 05/10/2017 08:10 AM    TOTAL BILIRUBIN 0.4 05/10/2017 08:10 AM

## 2017-06-06 LAB — TACROLIMUS BLD-MCNC: 2.5 NG/ML

## 2017-06-16 DIAGNOSIS — I27.20 PULMONARY HYPERTENSION (HCC): ICD-10-CM

## 2017-06-18 ENCOUNTER — PATIENT MESSAGE (OUTPATIENT)
Dept: PULMONOLOGY | Facility: HOSPICE | Age: 57
End: 2017-06-18

## 2017-06-19 NOTE — TELEPHONE ENCOUNTER
Shakira can you follow up on this? I signed order for you previously. Patient is considered order is incorrect.

## 2017-06-19 NOTE — TELEPHONE ENCOUNTER
From: oRxana Cuba  To: Gwyn Gaming M.D.  Sent: 6/18/2017 10:22 AM PDT  Subject: Non-Urgent Medical Question    Help! [facility] called me to confirm my O2 needs. Change from [facility]. Rx ALL WRONG. This is what I need: O2 concentrator up to 10 liters: high flow tubing; portable oxygen tubes size E & C Oxygen canisters with regulator for continuous flow ONLY. I am on 4-6 liters 24/7. I do NOT use a CPAP. Tested yesterday, O2 Sats W/O Oxygen: 84%. Don't even Know a DR. ESCALANTE. Please help me. I'm very worried about not having the correct oxygen supplies.  Roxana Cuba  [phone]

## 2017-06-22 ENCOUNTER — APPOINTMENT (OUTPATIENT)
Dept: RADIOLOGY | Facility: MEDICAL CENTER | Age: 57
DRG: 871 | End: 2017-06-22
Attending: INTERNAL MEDICINE
Payer: MEDICARE

## 2017-06-22 ENCOUNTER — TELEPHONE (OUTPATIENT)
Dept: MEDICAL GROUP | Facility: PHYSICIAN GROUP | Age: 57
End: 2017-06-22

## 2017-06-22 ENCOUNTER — RESOLUTE PROFESSIONAL BILLING HOSPITAL PROF FEE (OUTPATIENT)
Dept: HOSPITALIST | Facility: MEDICAL CENTER | Age: 57
End: 2017-06-22
Payer: MEDICARE

## 2017-06-22 ENCOUNTER — APPOINTMENT (OUTPATIENT)
Dept: RADIOLOGY | Facility: MEDICAL CENTER | Age: 57
DRG: 871 | End: 2017-06-22
Attending: EMERGENCY MEDICINE
Payer: MEDICARE

## 2017-06-22 ENCOUNTER — HOSPITAL ENCOUNTER (INPATIENT)
Facility: MEDICAL CENTER | Age: 57
LOS: 4 days | DRG: 871 | End: 2017-06-26
Attending: EMERGENCY MEDICINE | Admitting: INTERNAL MEDICINE
Payer: MEDICARE

## 2017-06-22 DIAGNOSIS — E65 ABDOMINAL PANNUS: ICD-10-CM

## 2017-06-22 DIAGNOSIS — R50.9 FEVER, UNSPECIFIED FEVER CAUSE: ICD-10-CM

## 2017-06-22 DIAGNOSIS — J18.9 PNEUMONIA OF LEFT LOWER LOBE DUE TO INFECTIOUS ORGANISM: ICD-10-CM

## 2017-06-22 PROBLEM — A41.9 SEPSIS, UNSPECIFIED: Status: ACTIVE | Noted: 2017-06-22

## 2017-06-22 LAB
ALBUMIN SERPL BCP-MCNC: 3.9 G/DL (ref 3.2–4.9)
ALBUMIN/GLOB SERPL: 1.6 G/DL
ALP SERPL-CCNC: 24 U/L (ref 30–99)
ALT SERPL-CCNC: 14 U/L (ref 2–50)
ANION GAP SERPL CALC-SCNC: 8 MMOL/L (ref 0–11.9)
APPEARANCE UR: CLEAR
APTT PPP: 27.9 SEC (ref 24.7–36)
APTT PPP: 30.3 SEC (ref 24.7–36)
AST SERPL-CCNC: 16 U/L (ref 12–45)
BASOPHILS # BLD AUTO: 0.2 % (ref 0–1.8)
BASOPHILS # BLD: 0.01 K/UL (ref 0–0.12)
BILIRUB SERPL-MCNC: 0.4 MG/DL (ref 0.1–1.5)
BILIRUB UR QL STRIP.AUTO: NEGATIVE
BUN SERPL-MCNC: 23 MG/DL (ref 8–22)
BURR CELLS/RBC NFR CSF MANUAL: 0 %
CALCIUM SERPL-MCNC: 9.2 MG/DL (ref 8.5–10.5)
CHLORIDE SERPL-SCNC: 102 MMOL/L (ref 96–112)
CLARITY CSF: CLEAR
CO2 SERPL-SCNC: 26 MMOL/L (ref 20–33)
COLOR CSF: COLORLESS
COLOR SPUN CSF: COLORLESS
COLOR UR: COLORLESS
CREAT SERPL-MCNC: 1.14 MG/DL (ref 0.5–1.4)
EOSINOPHIL # BLD AUTO: 0.04 K/UL (ref 0–0.51)
EOSINOPHIL NFR BLD: 0.6 % (ref 0–6.9)
ERYTHROCYTE [DISTWIDTH] IN BLOOD BY AUTOMATED COUNT: 43 FL (ref 35.9–50)
FLUAV H1 2009 PAND RNA SPEC QL NAA+PROBE: NOT DETECTED
FLUAV RNA SPEC QL NAA+PROBE: NEGATIVE
FLUAV+FLUBV AG SPEC QL IA: NORMAL
FLUBV RNA SPEC QL NAA+PROBE: NEGATIVE
GFR SERPL CREATININE-BSD FRML MDRD: 49 ML/MIN/1.73 M 2
GLOBULIN SER CALC-MCNC: 2.4 G/DL (ref 1.9–3.5)
GLUCOSE CSF-MCNC: 78 MG/DL (ref 40–80)
GLUCOSE SERPL-MCNC: 185 MG/DL (ref 65–99)
GLUCOSE UR STRIP.AUTO-MCNC: NEGATIVE MG/DL
GRAM STN SPEC: NORMAL
HCT VFR BLD AUTO: 34.6 % (ref 37–47)
HGB BLD-MCNC: 11.5 G/DL (ref 12–16)
IMM GRANULOCYTES # BLD AUTO: 0.02 K/UL (ref 0–0.11)
IMM GRANULOCYTES NFR BLD AUTO: 0.3 % (ref 0–0.9)
INR PPP: 1.02 (ref 0.87–1.13)
INR PPP: 1.09 (ref 0.87–1.13)
KETONES UR STRIP.AUTO-MCNC: NEGATIVE MG/DL
LACTATE BLD-SCNC: 0.7 MMOL/L (ref 0.5–2)
LACTATE BLD-SCNC: 1.6 MMOL/L (ref 0.5–2)
LACTATE CSF-SCNC: 1.4 MMOL/L
LEUKOCYTE ESTERASE UR QL STRIP.AUTO: NEGATIVE
LYMPHOCYTES # BLD AUTO: 0.43 K/UL (ref 1–4.8)
LYMPHOCYTES NFR BLD: 6.5 % (ref 22–41)
LYMPHOCYTES NFR CSF: 67 %
MCH RBC QN AUTO: 29 PG (ref 27–33)
MCHC RBC AUTO-ENTMCNC: 33.2 G/DL (ref 33.6–35)
MCV RBC AUTO: 87.4 FL (ref 81.4–97.8)
MICRO URNS: NORMAL
MONOCYTES # BLD AUTO: 0.32 K/UL (ref 0–0.85)
MONOCYTES NFR BLD AUTO: 4.8 % (ref 0–13.4)
MONONUC CELLS NFR CSF: 17 %
NEUTROPHILS # BLD AUTO: 5.78 K/UL (ref 2–7.15)
NEUTROPHILS NFR BLD: 87.6 % (ref 44–72)
NEUTROPHILS NFR CSF: 16 %
NITRITE UR QL STRIP.AUTO: NEGATIVE
NRBC # BLD AUTO: 0 K/UL
NRBC BLD AUTO-RTO: 0 /100 WBC
PH UR STRIP.AUTO: 6.5 [PH]
PHOSPHATE SERPL-MCNC: 2.7 MG/DL (ref 2.5–4.5)
PLATELET # BLD AUTO: 56 K/UL (ref 164–446)
PMV BLD AUTO: 9.3 FL (ref 9–12.9)
POTASSIUM SERPL-SCNC: 4.1 MMOL/L (ref 3.6–5.5)
PROCALCITONIN SERPL-MCNC: 0.05 NG/ML
PROT CSF-MCNC: 51 MG/DL (ref 15–45)
PROT SERPL-MCNC: 6.3 G/DL (ref 6–8.2)
PROT UR QL STRIP: NEGATIVE MG/DL
PROTHROMBIN TIME: 13.7 SEC (ref 12–14.6)
PROTHROMBIN TIME: 14.4 SEC (ref 12–14.6)
RBC # BLD AUTO: 3.96 M/UL (ref 4.2–5.4)
RBC # CSF: 1 CELLS/UL
RBC UR QL AUTO: NEGATIVE
SIGNIFICANT IND 70042: NORMAL
SIGNIFICANT IND 70042: NORMAL
SITE SITE: NORMAL
SITE SITE: NORMAL
SODIUM SERPL-SCNC: 136 MMOL/L (ref 135–145)
SOURCE SOURCE: NORMAL
SOURCE SOURCE: NORMAL
SP GR UR STRIP.AUTO: 1
SPECIMEN VOL CSF: 7.5 ML
TUBE # CSF: 3
TUBE # CSF: 3
WBC # BLD AUTO: 6.6 K/UL (ref 4.8–10.8)
WBC # CSF: 2 CELLS/UL (ref 0–10)

## 2017-06-22 PROCEDURE — 87086 URINE CULTURE/COLONY COUNT: CPT

## 2017-06-22 PROCEDURE — 99291 CRITICAL CARE FIRST HOUR: CPT

## 2017-06-22 PROCEDURE — 36415 COLL VENOUS BLD VENIPUNCTURE: CPT

## 2017-06-22 PROCEDURE — 71010 DX-CHEST-PORTABLE (1 VIEW): CPT

## 2017-06-22 PROCEDURE — 304562 HCHG STAT O2 MASK/CANNULA

## 2017-06-22 PROCEDURE — 85025 COMPLETE CBC W/AUTO DIFF WBC: CPT

## 2017-06-22 PROCEDURE — 700117 HCHG RX CONTRAST REV CODE 255: Performed by: INTERNAL MEDICINE

## 2017-06-22 PROCEDURE — 71260 CT THORAX DX C+: CPT

## 2017-06-22 PROCEDURE — C1751 CATH, INF, PER/CENT/MIDLINE: HCPCS | Performed by: INTERNAL MEDICINE

## 2017-06-22 PROCEDURE — 009U3ZX DRAINAGE OF SPINAL CANAL, PERCUTANEOUS APPROACH, DIAGNOSTIC: ICD-10-PCS | Performed by: RADIOLOGY

## 2017-06-22 PROCEDURE — C1751 CATH, INF, PER/CENT/MIDLINE: HCPCS

## 2017-06-22 PROCEDURE — 96365 THER/PROPH/DIAG IV INF INIT: CPT

## 2017-06-22 PROCEDURE — 700111 HCHG RX REV CODE 636 W/ 250 OVERRIDE (IP): Performed by: EMERGENCY MEDICINE

## 2017-06-22 PROCEDURE — 84100 ASSAY OF PHOSPHORUS: CPT

## 2017-06-22 PROCEDURE — 700101 HCHG RX REV CODE 250: Performed by: INTERNAL MEDICINE

## 2017-06-22 PROCEDURE — 700102 HCHG RX REV CODE 250 W/ 637 OVERRIDE(OP): Performed by: INTERNAL MEDICINE

## 2017-06-22 PROCEDURE — 96367 TX/PROPH/DG ADDL SEQ IV INF: CPT

## 2017-06-22 PROCEDURE — 85610 PROTHROMBIN TIME: CPT

## 2017-06-22 PROCEDURE — A9270 NON-COVERED ITEM OR SERVICE: HCPCS | Performed by: INTERNAL MEDICINE

## 2017-06-22 PROCEDURE — 87070 CULTURE OTHR SPECIMN AEROBIC: CPT

## 2017-06-22 PROCEDURE — 36556 INSERT NON-TUNNEL CV CATH: CPT

## 2017-06-22 PROCEDURE — 87503 INFLUENZA DNA AMP PROB ADDL: CPT

## 2017-06-22 PROCEDURE — 62270 DX LMBR SPI PNXR: CPT

## 2017-06-22 PROCEDURE — 83605 ASSAY OF LACTIC ACID: CPT

## 2017-06-22 PROCEDURE — 84145 PROCALCITONIN (PCT): CPT

## 2017-06-22 PROCEDURE — 82945 GLUCOSE OTHER FLUID: CPT

## 2017-06-22 PROCEDURE — 02HV33Z INSERTION OF INFUSION DEVICE INTO SUPERIOR VENA CAVA, PERCUTANEOUS APPROACH: ICD-10-PCS | Performed by: INTERNAL MEDICINE

## 2017-06-22 PROCEDURE — B548ZZA ULTRASONOGRAPHY OF SUPERIOR VENA CAVA, GUIDANCE: ICD-10-PCS | Performed by: INTERNAL MEDICINE

## 2017-06-22 PROCEDURE — 89051 BODY FLUID CELL COUNT: CPT

## 2017-06-22 PROCEDURE — 700111 HCHG RX REV CODE 636 W/ 250 OVERRIDE (IP): Performed by: INTERNAL MEDICINE

## 2017-06-22 PROCEDURE — 99291 CRITICAL CARE FIRST HOUR: CPT | Mod: 25 | Performed by: INTERNAL MEDICINE

## 2017-06-22 PROCEDURE — 87205 SMEAR GRAM STAIN: CPT

## 2017-06-22 PROCEDURE — 81003 URINALYSIS AUTO W/O SCOPE: CPT

## 2017-06-22 PROCEDURE — 87040 BLOOD CULTURE FOR BACTERIA: CPT

## 2017-06-22 PROCEDURE — 96361 HYDRATE IV INFUSION ADD-ON: CPT

## 2017-06-22 PROCEDURE — A9270 NON-COVERED ITEM OR SERVICE: HCPCS | Performed by: PHARMACIST

## 2017-06-22 PROCEDURE — 84157 ASSAY OF PROTEIN OTHER: CPT

## 2017-06-22 PROCEDURE — 700105 HCHG RX REV CODE 258: Performed by: EMERGENCY MEDICINE

## 2017-06-22 PROCEDURE — 80053 COMPREHEN METABOLIC PANEL: CPT

## 2017-06-22 PROCEDURE — 96376 TX/PRO/DX INJ SAME DRUG ADON: CPT

## 2017-06-22 PROCEDURE — 85730 THROMBOPLASTIN TIME PARTIAL: CPT

## 2017-06-22 PROCEDURE — 700105 HCHG RX REV CODE 258: Performed by: INTERNAL MEDICINE

## 2017-06-22 PROCEDURE — 700102 HCHG RX REV CODE 250 W/ 637 OVERRIDE(OP): Performed by: PHARMACIST

## 2017-06-22 PROCEDURE — 70450 CT HEAD/BRAIN W/O DYE: CPT

## 2017-06-22 PROCEDURE — 96375 TX/PRO/DX INJ NEW DRUG ADDON: CPT

## 2017-06-22 PROCEDURE — 87400 INFLUENZA A/B EACH AG IA: CPT

## 2017-06-22 PROCEDURE — 36556 INSERT NON-TUNNEL CV CATH: CPT | Performed by: INTERNAL MEDICINE

## 2017-06-22 PROCEDURE — 770022 HCHG ROOM/CARE - ICU (200)

## 2017-06-22 PROCEDURE — 87502 INFLUENZA DNA AMP PROBE: CPT

## 2017-06-22 RX ORDER — L. ACIDOPHILUS/L.BULGARICUS 100MM CELL
1 GRANULES IN PACKET (EA) ORAL
Status: DISCONTINUED | OUTPATIENT
Start: 2017-06-22 | End: 2017-06-26 | Stop reason: HOSPADM

## 2017-06-22 RX ORDER — ONDANSETRON 2 MG/ML
4 INJECTION INTRAMUSCULAR; INTRAVENOUS ONCE
Status: COMPLETED | OUTPATIENT
Start: 2017-06-22 | End: 2017-06-22

## 2017-06-22 RX ORDER — TIOTROPIUM BROMIDE 18 UG/1
1 CAPSULE ORAL; RESPIRATORY (INHALATION) DAILY
Status: DISCONTINUED | OUTPATIENT
Start: 2017-06-22 | End: 2017-06-26 | Stop reason: HOSPADM

## 2017-06-22 RX ORDER — PRAVASTATIN SODIUM 20 MG
20 TABLET ORAL DAILY
Status: DISCONTINUED | OUTPATIENT
Start: 2017-06-23 | End: 2017-06-26 | Stop reason: HOSPADM

## 2017-06-22 RX ORDER — CYCLOBENZAPRINE HCL 10 MG
10 TABLET ORAL 3 TIMES DAILY PRN
COMMUNITY
End: 2018-04-27

## 2017-06-22 RX ORDER — SODIUM CHLORIDE 9 MG/ML
30 INJECTION, SOLUTION INTRAVENOUS ONCE
Status: COMPLETED | OUTPATIENT
Start: 2017-06-22 | End: 2017-06-22

## 2017-06-22 RX ORDER — FLUTICASONE PROPIONATE 50 MCG
1 SPRAY, SUSPENSION (ML) NASAL DAILY
Status: DISCONTINUED | OUTPATIENT
Start: 2017-06-22 | End: 2017-06-22

## 2017-06-22 RX ORDER — DEXAMETHASONE SODIUM PHOSPHATE 4 MG/ML
4 INJECTION, SOLUTION INTRA-ARTICULAR; INTRALESIONAL; INTRAMUSCULAR; INTRAVENOUS; SOFT TISSUE ONCE
Status: COMPLETED | OUTPATIENT
Start: 2017-06-22 | End: 2017-06-22

## 2017-06-22 RX ORDER — ZINC OXIDE 13 %
1 CREAM (GRAM) TOPICAL DAILY
Status: DISCONTINUED | OUTPATIENT
Start: 2017-06-22 | End: 2017-06-22

## 2017-06-22 RX ORDER — SERTRALINE HYDROCHLORIDE 100 MG/1
100 TABLET, FILM COATED ORAL
Status: DISCONTINUED | OUTPATIENT
Start: 2017-06-22 | End: 2017-06-26 | Stop reason: HOSPADM

## 2017-06-22 RX ORDER — AMBRISENTAN 10 MG/1
10 TABLET, FILM COATED ORAL DAILY
Status: DISCONTINUED | OUTPATIENT
Start: 2017-06-23 | End: 2017-06-26 | Stop reason: HOSPADM

## 2017-06-22 RX ORDER — ONDANSETRON 2 MG/ML
4 INJECTION INTRAMUSCULAR; INTRAVENOUS EVERY 4 HOURS PRN
Status: DISCONTINUED | OUTPATIENT
Start: 2017-06-22 | End: 2017-06-26 | Stop reason: HOSPADM

## 2017-06-22 RX ORDER — TADALAFIL 20 MG/1
40 TABLET ORAL
Status: DISCONTINUED | OUTPATIENT
Start: 2017-06-22 | End: 2017-06-22

## 2017-06-22 RX ORDER — AMPICILLIN AND SULBACTAM 2; 1 G/1; G/1
3 INJECTION, POWDER, FOR SOLUTION INTRAMUSCULAR; INTRAVENOUS ONCE
Status: COMPLETED | OUTPATIENT
Start: 2017-06-22 | End: 2017-06-22

## 2017-06-22 RX ORDER — TRAZODONE HYDROCHLORIDE 50 MG/1
50-100 TABLET ORAL
Status: DISCONTINUED | OUTPATIENT
Start: 2017-06-22 | End: 2017-06-24

## 2017-06-22 RX ORDER — ALPRAZOLAM 0.5 MG/1
0.5 TABLET ORAL 3 TIMES DAILY PRN
Status: DISCONTINUED | OUTPATIENT
Start: 2017-06-22 | End: 2017-06-26 | Stop reason: HOSPADM

## 2017-06-22 RX ORDER — AMOXICILLIN 250 MG
2 CAPSULE ORAL 2 TIMES DAILY PRN
Status: DISCONTINUED | OUTPATIENT
Start: 2017-06-22 | End: 2017-06-26 | Stop reason: HOSPADM

## 2017-06-22 RX ORDER — OXYCODONE HYDROCHLORIDE 5 MG/1
5 TABLET ORAL
Status: DISCONTINUED | OUTPATIENT
Start: 2017-06-22 | End: 2017-06-22

## 2017-06-22 RX ORDER — SODIUM CHLORIDE 9 MG/ML
INJECTION, SOLUTION INTRAVENOUS CONTINUOUS
Status: DISCONTINUED | OUTPATIENT
Start: 2017-06-22 | End: 2017-06-23

## 2017-06-22 RX ORDER — OXYCODONE HYDROCHLORIDE 5 MG/1
10 TABLET ORAL
Status: DISCONTINUED | OUTPATIENT
Start: 2017-06-22 | End: 2017-06-22

## 2017-06-22 RX ORDER — BISACODYL 10 MG
10 SUPPOSITORY, RECTAL RECTAL
Status: DISCONTINUED | OUTPATIENT
Start: 2017-06-22 | End: 2017-06-26 | Stop reason: HOSPADM

## 2017-06-22 RX ORDER — DEXAMETHASONE SODIUM PHOSPHATE 4 MG/ML
4 INJECTION, SOLUTION INTRA-ARTICULAR; INTRALESIONAL; INTRAMUSCULAR; INTRAVENOUS; SOFT TISSUE EVERY 6 HOURS
Status: DISCONTINUED | OUTPATIENT
Start: 2017-06-22 | End: 2017-06-23

## 2017-06-22 RX ORDER — CYCLOBENZAPRINE HCL 10 MG
10 TABLET ORAL 3 TIMES DAILY PRN
Status: DISCONTINUED | OUTPATIENT
Start: 2017-06-22 | End: 2017-06-26 | Stop reason: HOSPADM

## 2017-06-22 RX ORDER — OMEPRAZOLE 20 MG/1
40 CAPSULE, DELAYED RELEASE ORAL DAILY
Status: DISCONTINUED | OUTPATIENT
Start: 2017-06-23 | End: 2017-06-26 | Stop reason: HOSPADM

## 2017-06-22 RX ORDER — MORPHINE SULFATE 4 MG/ML
4 INJECTION, SOLUTION INTRAMUSCULAR; INTRAVENOUS
Status: DISCONTINUED | OUTPATIENT
Start: 2017-06-22 | End: 2017-06-26 | Stop reason: HOSPADM

## 2017-06-22 RX ORDER — POLYETHYLENE GLYCOL 3350 17 G/17G
1 POWDER, FOR SOLUTION ORAL
Status: DISCONTINUED | OUTPATIENT
Start: 2017-06-22 | End: 2017-06-26 | Stop reason: HOSPADM

## 2017-06-22 RX ORDER — LEVOTHYROXINE SODIUM 137 UG/1
137 TABLET ORAL
Status: DISCONTINUED | OUTPATIENT
Start: 2017-06-23 | End: 2017-06-26 | Stop reason: HOSPADM

## 2017-06-22 RX ORDER — SODIUM CHLORIDE 9 MG/ML
1000 INJECTION, SOLUTION INTRAVENOUS
Status: DISCONTINUED | OUTPATIENT
Start: 2017-06-22 | End: 2017-06-26 | Stop reason: HOSPADM

## 2017-06-22 RX ORDER — ONDANSETRON 4 MG/1
4 TABLET, ORALLY DISINTEGRATING ORAL EVERY 4 HOURS PRN
Status: DISCONTINUED | OUTPATIENT
Start: 2017-06-22 | End: 2017-06-26 | Stop reason: HOSPADM

## 2017-06-22 RX ORDER — OXYCODONE HYDROCHLORIDE 5 MG/1
5 TABLET ORAL
Status: DISCONTINUED | OUTPATIENT
Start: 2017-06-22 | End: 2017-06-26 | Stop reason: HOSPADM

## 2017-06-22 RX ORDER — MORPHINE SULFATE 4 MG/ML
4 INJECTION, SOLUTION INTRAMUSCULAR; INTRAVENOUS ONCE
Status: COMPLETED | OUTPATIENT
Start: 2017-06-22 | End: 2017-06-22

## 2017-06-22 RX ORDER — OXYCODONE HYDROCHLORIDE 10 MG/1
10 TABLET ORAL
Status: DISCONTINUED | OUTPATIENT
Start: 2017-06-22 | End: 2017-06-26 | Stop reason: HOSPADM

## 2017-06-22 RX ORDER — AZITHROMYCIN 500 MG/1
500 INJECTION, POWDER, LYOPHILIZED, FOR SOLUTION INTRAVENOUS ONCE
Status: COMPLETED | OUTPATIENT
Start: 2017-06-22 | End: 2017-06-22

## 2017-06-22 RX ORDER — SODIUM CHLORIDE 9 MG/ML
1000 INJECTION, SOLUTION INTRAVENOUS
Status: COMPLETED | OUTPATIENT
Start: 2017-06-22 | End: 2017-06-22

## 2017-06-22 RX ORDER — GABAPENTIN 300 MG/1
600 CAPSULE ORAL 3 TIMES DAILY
Status: DISCONTINUED | OUTPATIENT
Start: 2017-06-22 | End: 2017-06-26 | Stop reason: HOSPADM

## 2017-06-22 RX ORDER — SODIUM CHLORIDE 9 MG/ML
1000 INJECTION, SOLUTION INTRAVENOUS ONCE
Status: ACTIVE | OUTPATIENT
Start: 2017-06-22 | End: 2017-06-23

## 2017-06-22 RX ADMIN — DEXAMETHASONE SODIUM PHOSPHATE 4 MG: 4 INJECTION, SOLUTION INTRAMUSCULAR; INTRAVENOUS at 19:22

## 2017-06-22 RX ADMIN — IOHEXOL 100 ML: 350 INJECTION, SOLUTION INTRAVENOUS at 18:19

## 2017-06-22 RX ADMIN — VANCOMYCIN HYDROCHLORIDE 1900 MG: 100 INJECTION, POWDER, LYOPHILIZED, FOR SOLUTION INTRAVENOUS at 21:10

## 2017-06-22 RX ADMIN — SERTRALINE 100 MG: 100 TABLET, FILM COATED ORAL at 21:15

## 2017-06-22 RX ADMIN — ALPRAZOLAM 0.5 MG: 0.5 TABLET ORAL at 21:15

## 2017-06-22 RX ADMIN — SODIUM CHLORIDE 1000 ML: 9 INJECTION, SOLUTION INTRAVENOUS at 16:21

## 2017-06-22 RX ADMIN — CEFTRIAXONE SODIUM 2 G: 2 INJECTION, POWDER, FOR SOLUTION INTRAMUSCULAR; INTRAVENOUS at 21:10

## 2017-06-22 RX ADMIN — MORPHINE SULFATE 4 MG: 4 INJECTION INTRAVENOUS at 14:20

## 2017-06-22 RX ADMIN — AZITHROMYCIN MONOHYDRATE 500 MG: 500 INJECTION, POWDER, LYOPHILIZED, FOR SOLUTION INTRAVENOUS at 15:11

## 2017-06-22 RX ADMIN — AMPICILLIN SODIUM 2000 MG: 2 INJECTION, POWDER, FOR SOLUTION INTRAMUSCULAR; INTRAVENOUS at 23:19

## 2017-06-22 RX ADMIN — NOREPINEPHRINE BITARTRATE 10 MCG/MIN: 1 INJECTION INTRAVENOUS at 20:25

## 2017-06-22 RX ADMIN — DEXAMETHASONE SODIUM PHOSPHATE 4 MG: 4 INJECTION, SOLUTION INTRAMUSCULAR; INTRAVENOUS at 23:19

## 2017-06-22 RX ADMIN — LACTOBACILLUS ACIDOPHILUS / LACTOBACILLUS BULGARICUS 1 PACKET: 100 MILLION CFU STRENGTH GRANULES at 21:14

## 2017-06-22 RX ADMIN — SODIUM CHLORIDE: 9 INJECTION, SOLUTION INTRAVENOUS at 19:24

## 2017-06-22 RX ADMIN — AMPICILLIN SODIUM AND SULBACTAM SODIUM 3 G: 2; 1 INJECTION, POWDER, FOR SOLUTION INTRAMUSCULAR; INTRAVENOUS at 14:20

## 2017-06-22 RX ADMIN — HYDROMORPHONE HYDROCHLORIDE 1 MG: 1 INJECTION, SOLUTION INTRAMUSCULAR; INTRAVENOUS; SUBCUTANEOUS at 13:44

## 2017-06-22 RX ADMIN — OXYCODONE HYDROCHLORIDE 10 MG: 10 TABLET ORAL at 21:15

## 2017-06-22 RX ADMIN — MORPHINE SULFATE 4 MG: 4 INJECTION INTRAVENOUS at 20:27

## 2017-06-22 RX ADMIN — GABAPENTIN 600 MG: 300 CAPSULE ORAL at 19:22

## 2017-06-22 RX ADMIN — ONDANSETRON 4 MG: 2 INJECTION INTRAMUSCULAR; INTRAVENOUS at 13:45

## 2017-06-22 RX ADMIN — MORPHINE SULFATE 4 MG: 4 INJECTION INTRAVENOUS at 23:42

## 2017-06-22 RX ADMIN — SODIUM CHLORIDE 2313 ML: 9 INJECTION, SOLUTION INTRAVENOUS at 13:37

## 2017-06-22 ASSESSMENT — COPD QUESTIONNAIRES
DO YOU EVER COUGH UP ANY MUCUS OR PHLEGM?: NO/ONLY WITH OCCASIONAL COLDS OR INFECTIONS
HAVE YOU SMOKED AT LEAST 100 CIGARETTES IN YOUR ENTIRE LIFE: NO/DON'T KNOW
DURING THE PAST 4 WEEKS HOW MUCH DID YOU FEEL SHORT OF BREATH: NONE/LITTLE OF THE TIME
COPD SCREENING SCORE: 1

## 2017-06-22 ASSESSMENT — PAIN SCALES - GENERAL
PAINLEVEL_OUTOF10: 10
PAINLEVEL_OUTOF10: 9
PAINLEVEL_OUTOF10: 8
PAINLEVEL_OUTOF10: 8
PAINLEVEL_OUTOF10: 10
PAINLEVEL_OUTOF10: 8
PAINLEVEL_OUTOF10: 7
PAINLEVEL_OUTOF10: 9

## 2017-06-22 ASSESSMENT — LIFESTYLE VARIABLES: EVER_SMOKED: NEVER

## 2017-06-22 NOTE — IP AVS SNAPSHOT
[employer] Access Code: Activation code not generated  Current [employer]Company Status: Active    [employer]  A secure, online tool to manage your health information     [employer]’s [employer]® is a secure, online tool that connects you to your personalized health information from the privacy of your home -- day or night - making it very easy for you to manage your healthcare. Once the activation process is completed, you can even access your medical information using the [employer] kacy, which is available for free in the Apple Kacy store or Google Play store.     [employer] provides the following levels of access (as shown below):   My Chart Features   [facility] Primary Care Doctor [facility]  Specialists [facility]  Urgent  Care Non-[facility]  Primary Care  Doctor   Email your healthcare team securely and privately 24/7 X X X X   Manage appointments: schedule your next appointment; view details of past/upcoming appointments X      Request prescription refills. X      View recent personal medical records, including lab and immunizations X X X X   View health record, including health history, allergies, medications X X X X   Read reports about your outpatient visits, procedures, consult and ER notes X X X X   See your discharge summary, which is a recap of your hospital and/or ER visit that includes your diagnosis, lab results, and care plan. X X       How to register for [employer]:  1. Go to  [email or web address].  2. Click on the Sign Up Now box, which takes you to the New Member Sign Up page. You will need to provide the following information:  a. Enter your [employer] Access Code exactly as it appears at the top of this page. (You will not need to use this code after you’ve completed the sign-up process. If you do not sign up before the expiration date, you must request a new code.)   b. Enter your date of birth.   c. Enter your home email address.   d. Click Submit, and follow the next screen’s instructions.  3. Create a [employer]Company ID. This will  be your "[employer]" login ID and cannot be changed, so think of one that is secure and easy to remember.  4. Create a "[employer]" password. You can change your password at any time.  5. Enter your Password Reset Question and Answer. This can be used at a later time if you forget your password.   6. Enter your e-mail address. This allows you to receive e-mail notifications when new information is available in "[employer]".  7. Click Sign Up. You can now view your health information.    For assistance activating your "[employer]" account, call [phone]

## 2017-06-22 NOTE — IP AVS SNAPSHOT
" Home Care Instructions                                                                                                                  Name:Roxana Cuba  Medical Record Number:[id number]  CSN: [id number]    YOB: 1960   Age: 57 y.o.  Sex: female  HT:1.727 m (5' 8\") WT: 80 kg (176 lb 5.9 oz)          Admit Date: 6/22/2017     Discharge Date:   Today's Date: 6/26/2017  Attending Doctor:  Suzy Reese M.D.                  Allergies:  Rhubarb and Vancomycin hcl            Discharge Instructions       Discharge Instructions    Discharged to home by car with relative. Discharged via wheelchair, hospital escort: Yes.  Special equipment needed: Oxygen    Be sure to schedule a follow-up appointment with your primary care doctor or any specialists as instructed.     Discharge Plan:   Diet Plan: Discussed (diabetic, heart healthy)  Activity Level: Discussed (as tolerated)  Confirmed Follow up Appointment: Patient to Call and Schedule Appointment  Confirmed Symptoms Management: Discussed  Medication Reconciliation Updated: Yes  Influenza Vaccine Indication: Not indicated: Previously immunized this influenza season and > 8 years of age    I understand that a diet low in cholesterol, fat, and sodium is recommended for good health. Unless I have been given specific instructions below for another diet, I accept this instruction as my diet prescription.   Other diet: diabetic heart healthy    Special Instructions: None    · Is patient discharged on Warfarin / Coumadin?   No     · Is patient Post Blood Transfusion?  No    Depression / Suicide Risk    As you are discharged from this [facility] facility, it is important to learn how to keep safe from harming yourself.    Recognize the warning signs:  · Abrupt changes in personality, positive or negative- including increase in energy   · Giving away possessions  · Change in eating patterns- significant weight changes-  positive or negative  · Change in sleeping patterns- " unable to sleep or sleeping all the time   · Unwillingness or inability to communicate  · Depression  · Unusual sadness, discouragement and loneliness  · Talk of wanting to die  · Neglect of personal appearance   · Rebelliousness- reckless behavior  · Withdrawal from people/activities they love  · Confusion- inability to concentrate     If you or a loved one observes any of these behaviors or has concerns about self-harm, here's what you can do:  · Talk about it- your feelings and reasons for harming yourself  · Remove any means that you might use to hurt yourself (examples: pills, rope, extension cords, firearm)  · Get professional help from the community (Mental Health, Substance Abuse, psychological counseling)  · Do not be alone:Call your Safe Contact- someone whom you trust who will be there for you.  · Call your local CRISIS HOTLINE 417-9731 or 159-017-7627  · Call your local Children's Mobile Crisis Response Team [place] (563) 457-2111 or www.Exposed Vocals  · Call the toll free National Suicide Prevention Hotlines   · National Suicide Prevention Lifeline 536-958-FADQ (1739)  · National Hope Line Network 800-SUICIDE (218-4735)      Sepsis, Adult  Sepsis is a serious infection of your blood or tissues that affects your whole body. The infection that causes sepsis may be bacterial, viral, fungal, or parasitic. Sepsis may be life threatening. Sepsis can cause your blood pressure to drop. This may result in shock. Shock causes your central nervous system and your organs to stop working correctly.   RISK FACTORS  Sepsis can happen in anyone, but it is more likely to happen in people who have weakened immune systems.  SIGNS AND SYMPTOMS   Symptoms of sepsis can include:  · Fever or low body temperature (hypothermia).  · Rapid breathing (hyperventilation).  · Chills.  · Rapid heartbeat (tachycardia).  · Confusion or light-headedness.  · Trouble breathing.  · Urinating much less than usual.  · Cool, clammy  skin or red, flushed skin.  · Other problems with the heart, kidneys, or brain.  DIAGNOSIS   Your health care provider will likely do tests to look for an infection, to see if the infection has spread to your blood, and to see how serious your condition is. Tests can include:  · Blood tests, including cultures of your blood.  · Cultures of other fluids from your body, such as:  ¨ Urine.  ¨ Pus from wounds.  ¨ Mucus coughed up from your lungs.  · Urine tests other than cultures.  · X-ray exams or other imaging tests.  TREATMENT   Treatment will begin with elimination of the source of infection. If your sepsis is likely caused by a bacterial or fungal infection, you will be given antibiotic or antifungal medicines.  You may also receive:  · Oxygen.  · Fluids through an IV tube.  · Medicines to increase your blood pressure.  · A machine to clean your blood (dialysis) if your kidneys fail.  · A machine to help you breathe if your lungs fail.  SEEK IMMEDIATE MEDICAL CARE IF:  You get an infection or develop any of the signs and symptoms of sepsis after surgery or a hospitalization.     This information is not intended to replace advice given to you by your health care provider. Make sure you discuss any questions you have with your health care provider.     Document Released: 09/15/2004 Document Revised: 05/03/2016 Document Reviewed: 08/25/2014  [facility] Interactive Patient Education ©2016 [facility]            Your appointments     Jul 03, 2017  8:00 AM   Adult Draw/Collection with [facility]   [facility] (--)    [address]  [place] [address]   [phone]            Jul 14, 2017 10:00 AM   Established Patient with Bernarda Reyes D.O.   [facility])    [address] [place] [address], [address]  [place] [id number] [phone]           You will be receiving a confirmation call a few days before your appointment from our automated call confirmation system.            Aug  08, 2017 10:00 AM   FOLLOW UP with Maxwell Phan M.D.   [facility] (--)    [address]  [place] [address]   [phone]            Aug 09, 2017  8:00 AM   Adult Draw/Collection with [facility]   [facility] (--)    [address] [place] [address]   [phone]            Aug 21, 2017  2:40 PM   Established Patient Pul with A Rotation   [facility] (--)    [address]  [address]  [place] [address]   [phone]            Sep 06, 2017  8:00 AM   Adult Draw/Collection with [facility]   [facility] (--)    [address] [place] [address]   [phone]              Follow-up Information     1. Follow up with Bernarda Reyes D.O.. Schedule an appointment as soon as possible for a visit in 1 week.    Specialty:  Family Medicine    Why:  follow up    Contact information    [facility] [facility]  [address]  [place] [id number] [phone]           Discharge Medication Instructions:    Below are the medications your physician expects you to take upon discharge:    Review all your home medications and newly ordered medications with your doctor and/or pharmacist. Follow medication instructions as directed by your doctor and/or pharmacist.    Please keep your medication list with you and share with your physician.               Medication List      START taking these medications        Instructions    Morning Afternoon Evening Bedtime    azithromycin 250 MG Tabs   Commonly known as:  ZITHROMAX        Take 1 Tab by mouth every day.   Dose:  250 mg                          CONTINUE taking these medications        Instructions    Morning Afternoon Evening Bedtime    ADCIRCA 20 MG Tabs   Last time this was given:  40 mg on 6/25/2017  9:00 PM   Generic drug:  Tadalafil (PAH)        Take 40 mg by mouth every bedtime. Indications: Pulmonary Arterial Hypertension   Dose:  40 mg                        alprazolam  0.5 MG Tabs   Last time this was given:  0.5 mg on 6/26/2017 11:28 AM   Commonly known as:  XANAX        Doctor's comments:  Refill after 5/4/17   Take 1 Tab by mouth 3 times a day.   Dose:  0.5 mg                        ambrisentan 10 MG tablet   Last time this was given:  10 mg on 6/26/2017  8:33 AM   Commonly known as:  LETAIRIS        Take 1 Tab by mouth every day.   Dose:  10 mg                        ascorbic acid 500 MG Tabs   Commonly known as:  ascorbic acid        Take 500 mg by mouth every day.   Dose:  500 mg                        aspirin EC 81 MG Tbec   Last time this was given:  81 mg on 6/26/2017  8:30 AM   Commonly known as:  ECOTRIN        Take 1 Tab by mouth every morning.   Dose:  81 mg                        CELLCEPT 250 MG Caps   Last time this was given:  500 mg on 6/26/2017  8:27 AM   Generic drug:  mycophenolate        Take 500 mg by mouth 2 times a day.   Dose:  500 mg                        CITRACAL MAXIMUM 315-250 MG-UNIT Tabs   Generic drug:  Calcium Citrate-Vitamin D        TAKE 2 TABS BY MOUTH 2X/ DAY WITH FOOD BEFORE AND AFTER DINNER FOR LIFE-CRUSH 1ST 3 MONTH, SPACE MVI                        cyclobenzaprine 10 MG Tabs   Commonly known as:  FLEXERIL        Take 10 mg by mouth 3 times a day as needed.   Dose:  10 mg                        docusate sodium 100 MG Caps   Commonly known as:  COLACE        Take 100 mg by mouth every day.   Dose:  100 mg                        ferrous sulfate 220 (44 FE) MG/5ML Elix   Commonly known as:  FEOSOL        Take 5 mL by mouth every day.   Dose:  220 mg                        fluticasone 50 MCG/ACT nasal spray   Commonly known as:  FLONASE        Spray 1 Spray in nose every day.   Dose:  1 Spray                        gabapentin 600 MG tablet   Commonly known as:  NEURONTIN        Doctor's comments:  Authorization of a refill request.   Take 1 Tab by mouth 3 times a day.   Dose:  600 mg                        levothyroxine 137 MCG Tabs   Last time  this was given:  137 mcg on 6/26/2017  5:43 AM   Commonly known as:  SYNTHROID        Take 1 Tab by mouth every day.   Dose:  137 mcg                        Linaclotide 290 MCG Caps   Last time this was given:  290 mcg on 6/26/2017  8:34 AM        Take 290 mg by mouth every day.   Dose:  290 mg                        omeprazole 40 MG delayed-release capsule   Last time this was given:  40 mg on 6/26/2017  8:28 AM   Commonly known as:  PRILOSEC        Take 1 Cap by mouth every day.   Dose:  40 mg                        ondansetron 4 MG Tbdp   Last time this was given:  4 mg on 6/26/2017 11:28 AM   Commonly known as:  ZOFRAN ODT        Take 1 Tab by mouth every 8 hours as needed for Nausea/Vomiting. Nausea and vomiting   Dose:  4 mg                        oxycodone immediate release 10 MG immediate release tablet   Last time this was given:  10 mg on 6/24/2017  9:29 PM   Commonly known as:  ROXICODONE        Doctor's comments:  Prescription #3 refill after 6/22/17   Take 1-3 Tabs by mouth every 6 hours as needed for Moderate Pain.   Dose:  10-30 mg                        pravastatin 20 MG Tabs   Last time this was given:  20 mg on 6/26/2017  8:31 AM   Commonly known as:  PRAVACHOL        Take 1 Tab by mouth every day.   Dose:  20 mg                        predniSONE 5 MG Tabs   Last time this was given:  15 mg on 6/26/2017  8:29 AM   Commonly known as:  DELTASONE        Take 7 mg by mouth every morning.   Dose:  7 mg                        PROBIOTIC DAILY PO        Take 1 Cap by mouth every day.   Dose:  1 Cap                        sennosides 8.6 MG Tabs   Commonly known as:  SENOKOT        Take 17.2 mg by mouth 2 times a day.   Dose:  17.2 mg                        sertraline 100 MG Tabs   Last time this was given:  100 mg on 6/25/2017  8:45 PM   Commonly known as:  ZOLOFT        Take 100 mg by mouth every bedtime.   Dose:  100 mg                        tacrolimus 0.5 MG Caps   Last time this was given:  1.5 mg on  6/26/2017  8:30 AM   Commonly known as:  PROGRAF        Take 1.5 mg by mouth 2 times a day.   Dose:  1.5 mg                        tiotropium 18 MCG Caps   Last time this was given:  1 Cap on 6/26/2017  8:32 AM   Commonly known as:  SPIRIVA        Inhale 1 Cap by mouth every day.   Dose:  18 mcg                        trazodone 50 MG Tabs   Last time this was given:  100 mg on 6/25/2017 12:42 AM   Commonly known as:  DESYREL        Take 1-2 Tabs by mouth at bedtime as needed for Sleep.   Dose:   mg                          ASK your doctor about these medications        Instructions    Morning Afternoon Evening Bedtime    * furosemide 40 MG Tabs   What changed:  Another medication with the same name was added. Make sure you understand how and when to take each.   Last time this was given:  40 mg on 6/26/2017  8:29 AM   Commonly known as:  LASIX   Ask about: Which instructions should I use?        Take 40 mg by mouth every morning.   Dose:  40 mg                        * furosemide 40 MG Tabs   What changed:  You were already taking a medication with the same name, and this prescription was added. Make sure you understand how and when to take each.   Last time this was given:  40 mg on 6/26/2017  8:29 AM   Commonly known as:  LASIX   Ask about: Which instructions should I use?        TAKE 1 TAB BY MOUTH EVERY DAY.                        * Notice:  This list has 2 medication(s) that are the same as other medications prescribed for you. Read the directions carefully, and ask your doctor or other care provider to review them with you.         Where to Get Your Medications      These medications were sent to [id number] - [place] - [address]  [address] [place] [address]     Phone:  [phone]    - azithromycin 250 MG Tabs      Information about where to get these medications is not yet available     ! Ask your nurse or doctor about these medications    - furosemide 40 MG Tabs               Instructions           Diet / Nutrition:    Follow any diet instructions given to you by your doctor or the dietician, including how much salt (sodium) you are allowed each day.    If you are overweight, talk to your doctor about a weight reduction plan.    Activity:    Remain physically active following your doctor's instructions about exercise and activity.    Rest often.     Any time you become even a little tired or short of breath, SIT DOWN and rest.    Worsening Symptoms:    Report any of the following signs and symptoms to the doctor's office immediately:    *Pain of jaw, arm, or neck  *Chest pain not relieved by medication                               *Dizziness or loss of consciousness  *Difficulty breathing even when at rest   *More tired than usual                                       *Bleeding drainage or swelling of surgical site  *Swelling of feet, ankles, legs or stomach                 *Fever (>100ºF)  *Pink or blood tinged sputum  *Weight gain (3lbs/day or 5lbs /week)           *Shock from internal defibrillator (if applicable)  *Palpitations or irregular heartbeats                *Cool and/or numb extremities    Stroke Awareness    Common Risk Factors for Stroke include:    Age  Atrial Fibrillation  Carotid Artery Stenosis  Diabetes Mellitus  Excessive alcohol consumption  High blood pressure  Overweight   Physical inactivity  Smoking    Warning signs and symptoms of a stroke include:    *Sudden numbness or weakness of the face, arm or leg (especially on one side of the body).  *Sudden confusion, trouble speaking or understanding.  *Sudden trouble seeing in one or both eyes.  *Sudden trouble walking, dizziness, loss of balance or coordination.Sudden severe headache with no known cause.    It is very important to get treatment quickly when a stroke occurs. If you experience any of the above warning signs, call 911 immediately.                   Disclaimer         Quit Smoking / Tobacco Use:    I  understand the use of any tobacco products increases my chance of suffering from future heart disease or stroke and could cause other illnesses which may shorten my life. Quitting the use of tobacco products is the single most important thing I can do to improve my health. For further information on smoking / tobacco cessation call a Toll Free Quit Line at 1-864.624.5023 (*[employer]) or 1-841.149.6295 (American Lung Association) or you can access the web based program at www.lungusa.org.    [place] Tobacco Users Help Line:  (701) 179-7678       Toll Free: 1-623.169.3095  Quit Tobacco Program [facility] (639)669-5910    Crisis Hotline:    [place] Crisis Hotline:  8-547-PDMRFFU or 1-657.150.6143    [place] Crisis Hotline:    1-708.327.7030 or 501-467-3654    Discharge Survey:   Thank you for choosing [facility]. We hope we did everything we could to make your hospital stay a pleasant one. You may be receiving a phone survey and we would appreciate your time and participation in answering the questions. Your input is very valuable to us in our efforts to improve our service to our patients and their families.        My signature on this form indicates that:    1. I have reviewed and understand the above information.  2. My questions regarding this information have been answered to my satisfaction.  3. I have formulated a plan with my discharge nurse to obtain my prescribed medications for home.                  Disclaimer         __________________________________                     __________       ________                       Patient Signature                                                 Date                    Time

## 2017-06-22 NOTE — ED NOTES
Second set of blood cx sent, medicated per MAR.  Assisted to [facility], urine obtained and sent.  at bedside.  Denies other needs at this time.

## 2017-06-22 NOTE — TELEPHONE ENCOUNTER
Pt called stating that she is needing a new referral sent to [facility] for Dr Michelle Barfield    The referral that we sent for [place] did not go through because insurance would not cover under medical reasoning.    Pt has had a liver transplant and since having that she has lost weight and needs to excess skin removed. Her liver specialists told her to try [facility] and to see Dr Barfield for this because it is medically necessary.     Please advise. Thank you.

## 2017-06-22 NOTE — ED NOTES
"Pt stating \"i'm still in pain, dilaudid does nothing for me, I need morphine\",  Will notify ERP for continued pain.   "

## 2017-06-22 NOTE — ED PROVIDER NOTES
ED Provider Note    Scribed for Julito Amos M.D. by Amina Fraser. 6/22/2017, 1:29 PM.    Primary care provider: Bernarda Reyes D.O.  Means of arrival: ambulance   History obtained from: patient   History limited by: none     CHIEF COMPLAINT  Chief Complaint   Patient presents with   • LUQ Pain     biba from home,  radiates into (L) clavicle.  increased pain with deep respirations.    • Head Ache     started yesterday on (L) side of head.        HPI  Roxana Cuba is a 57 y.o. female who presents to the Emergency Department complaining of left upper quadrant pain onset today. Her left upper abdominal pain radiates to her upper back and is exacerbated when she breaths in or out with a 10/10 severity. The patient reports noting enlargement of her speen today. She had an associated fever at home with a temperature of 103 °F. She confirms difficulty urinating and constipation secondary to narcotic pain medication use. She has a history of three previous sepsis admissions all with unknown source. She denies cough.       REVIEW OF SYSTEMS  Pertinent positives include left upper quadrant abdominal pain, difficulty urinating, constipation.   Pertinent negatives include no cough.   As above, all other systems reviewed and are negative.   See HPI for further details.  C.       PAST MEDICAL HISTORY   has a past medical history of Cirrhosis (CMS-HCC) (December 2011); S/P cholecystectomy; GERD (gastroesophageal reflux disease); Psychiatric disorder; Fracture of left foot; Bronchitis ( ); CKD (chronic kidney disease) stage 3, GFR 30-59 ml/min; Breath shortness; Chronic fatigue and malaise; VRE (vancomycin-resistant Enterococci); Low back pain (02-17-17); Pneumonia; Mild aortic regurgitation and aortic valve sclerosis ( ); Splenomegaly; Small bowel obstruction (CMS-HCC); On home oxygen therapy; Pulmonary hypertension (CMS-HCC); Diabetes (CMS-HCC); Hypothyroid; H/O Clostridium difficile infection (02-17-17); and Platelet  disorder (CMS-HCC).    SURGICAL HISTORY   has past surgical history that includes anesth,nose,sinus surgery; makayla by laparoscopy (9/19/2009); exam under anesthesia (11/11/2009); hysterectomy, total abdominal; other; gastroscopy-endo (3/12/2010); bronchoscopy-endo (5/29/2012); bronchoscopy-endo (6/19/2012); sigmoidoscopy flex (9/26/2012); recovery (2/27/2013); bronchoscopy-endo (11/15/2013); recovery (1/21/2014); recovery (3/24/2014); hemorrhoidectomy (11/11/2009); gastroscopy (9/28/2012); carpal tunnel release; bone marrow aspiration (12/31/2012); bone marrow biopsy, ndl/trocar (12/31/2012); recovery (3/31/2014); other abdominal surgery (December 2011); bone marrow aspiration (3/16/2015); bone marrow biopsy, ndl/trocar (3/16/2015); lung biopsy open (11/2016); tonsillectomy; other abdominal surgery (); breast biopsy (Left, 2/21/2017); colonoscopy (N/A, 3/27/2017); and gastroscopy (N/A, 3/27/2017).    SOCIAL HISTORY  Social History   Substance Use Topics   • Smoking status: Passive Smoke Exposure - Never Smoker   • Smokeless tobacco: Never Used      Comment: avoid all tobacco products   • Alcohol Use: No      Comment: 05/2009 quit drinking      History   Drug Use No       FAMILY HISTORY  Family History   Problem Relation Age of Onset   • Other Father      Unknown (dead 10 years)   • Diabetes Father    • Heart Disease Father    • Hypertension Father    • Hyperlipidemia Father    • Stroke Father    • Non-contributory Mother    • Hyperlipidemia Mother    • Alcohol/Drug Mother    • Cancer Paternal Aunt    • Alcohol/Drug Maternal Grandmother    • Alcohol/Drug Maternal Grandfather        CURRENT MEDICATIONS  Home Medications     Reviewed by Kerry Seymour, Pharmacy Int (Pharmacy Intern) on 06/22/17 at 1426  Med List Status: Complete    Medication Last Dose Status    alprazolam (XANAX) 0.5 MG Tab prn Active    ambrisentan (LETAIRIS) 10 MG tablet 6/22/2017 Active    ascorbic acid (ASCORBIC ACID) 500 MG Tab 6/22/2017  Active    aspirin EC (ECOTRIN) 81 MG Tablet Delayed Response 6/22/2017 Active    CITRACAL MAXIMUM 315-250 MG-UNIT Tab 6/22/2017 Active    cyclobenzaprine (FLEXERIL) 10 MG Tab prn Active    docusate sodium (COLACE) 100 MG Cap 6/22/2017 Active    ferrous sulfate (FEOSOL) 220 (44 FE) MG/5ML Elixir 6/22/2017 Active    fluticasone (FLONASE) 50 MCG/ACT nasal spray 4/12/2017 Active    furosemide (LASIX) 40 MG Tab 6/22/2017 Active    gabapentin (NEURONTIN) 600 MG tablet 6/22/2017 Active    levothyroxine (SYNTHROID) 137 MCG Tab 6/22/2017 Active    Linaclotide 290 MCG Cap 6/22/2017 Active    mycophenolate (CELLCEPT) 250 MG Cap 6/22/2017 Active    omeprazole (PRILOSEC) 40 MG delayed-release capsule 6/22/2017 Active    ondansetron (ZOFRAN ODT) 4 MG TABLET DISPERSIBLE prn Active    oxycodone immediate release (ROXICODONE) 10 MG immediate release tablet prn Active    pravastatin (PRAVACHOL) 20 MG Tab 6/22/2017 Active    predniSONE (DELTASONE) 5 MG Tab 6/22/2017 Active    Probiotic Product (PROBIOTIC DAILY PO) 6/21/2017 Active    sennosides (SENOKOT) 8.6 MG Tab 6/22/2017 Active    sertraline (ZOLOFT) 100 MG Tab 6/21/2017 Active    tacrolimus (PROGRAF) 0.5 MG Cap 6/22/2017 Active    Tadalafil, PAH, (ADCIRCA) 20 MG Tab 6/21/2017 Active    tiotropium (SPIRIVA) 18 MCG Cap 4/12/2017 Active    trazodone (DESYREL) 50 MG Tab prn Active                ALLERGIES  Allergies   Allergen Reactions   • Rhubarb Anaphylaxis   • Vancomycin Hcl      Causes red man syndrome when infused to fast           PHYSICAL EXAM  VITAL SIGNS: BP 94/58 mmHg  Pulse 98  Temp(Src) 38.1 °C (100.6 °F)  Resp 18  Wt 77.111 kg (170 lb)  SpO2 96%  LMP 01/03/2000  Vitals reviewed.  Constitutional: Alert.   HENT: No signs of trauma, Bilateral external ears normal, Nose normal.   Eyes: Pupils are equal and reactive, Conjunctiva normal, Non-icteric.   Neck: Normal range of motion, No tenderness, Supple, No stridor.   Lymphatic: No lymphadenopathy noted.  "  Cardiovascular: Regular rate and rhythm, no murmurs.   Thorax & Lungs: Crackles to the bilateral bases, moderate respiratory distress, No wheezing, No chest tenderness.   Abdomen: Bowel sounds normal, Soft, tenderness to the left upper quadrant, No peritoneal signs, No masses, No pulsatile masses.   Skin: Warm, Dry, No erythema, No rash.   Back: No bony tenderness, No CVA tenderness.   Extremities: Intact distal pulses, No edema, No tenderness, No cyanosis  Musculoskeletal: Good range of motion in all major joints. No tenderness to palpation or major deformities noted.   Neurologic: Alert , Normal motor function, Normal sensory function, No focal deficits noted.   Psychiatric: Affect normal, Judgment normal, Mood normal.         DIAGNOSTIC STUDIES / PROCEDURES  LABS  Labs Reviewed   CBC WITH DIFFERENTIAL - Abnormal; Notable for the following:     RBC 3.96 (*)     Hemoglobin 11.5 (*)     Hematocrit 34.6 (*)     MCHC 33.2 (*)     Platelet Count 56 (*)     Neutrophils-Polys 87.60 (*)     Lymphocytes 6.50 (*)     Lymphs (Absolute) 0.43 (*)     All other components within normal limits   COMP METABOLIC PANEL - Abnormal; Notable for the following:     Glucose 185 (*)     Bun 23 (*)     Alkaline Phosphatase 24 (*)     All other components within normal limits   ESTIMATED GFR - Abnormal; Notable for the following:     GFR If  59 (*)     GFR If Non  49 (*)     All other components within normal limits   LACTIC ACID   LACTIC ACID   URINALYSIS    Narrative:     Indication for culture:->Emergency Room Patient   URINE CULTURE(NEW)    Narrative:     Indication for culture:->Emergency Room Patient   BLOOD CULTURE    Narrative:     Per Hospital Policy: Only change Specimen Src: to \"Line\" if  specified by physician order.   BLOOD CULTURE    Narrative:     Per Hospital Policy: Only change Specimen Src: to \"Line\" if  specified by physician order.   INFLUENZA RAPID   APTT    Narrative:     Indicate " which anticoagulants the patient is on:->NONE   PROTHROMBIN TIME    Narrative:     Indicate which anticoagulants the patient is on:->NONE   INFLUENZA BY PCR, A/B/H1N1   BLOOD CULTURE   BLOOD CULTURE   URINALYSIS   CULTURE RESPIRATORY W/ GRM STN   PROTHROMBIN TIME   APTT   PHOSPHORUS   PROCALCITONIN    All labs reviewed by me.        RADIOLOGY  The radiologist's interpretation of all radiological studies have been reviewed by me.  DX-CHEST-PORTABLE (1 VIEW)   Final Result      Left lung base atelectasis/possible pneumonia and smaller pleural effusion.   Right lung base atelectasis.                       COURSE & MEDICAL DECISION MAKING  Nursing notes, VS, PMSFHx reviewed in chart.    Differential diagnoses include but not limited to: pneumonia, UTI, dehydration      1:25 PM Obtained and reviewed past medical records.    1:29 PM Patient seen and examined at bedside. Discussed plan of care with the patient. She agrees to admission secondary to signs and symptoms indicating sepsis and history of three previous admissions for sepsis. Ordered for DX chest, lactic acid, PTT, influenza rapid, estimated gfr, CBC, CMP, urinalysis, urine culture and blood culture to evaluate. Patient will be treated with zofran 4 mg, dilaudid 1 mg, unasyn 3 g and zithromax 500 mg for her symptoms.  The patient will be resuscitated with 1L NS IV secondary to hypotension and sepsis protocol.     1:44 PM Paged the hospitalist.     1:45 PM Consult with Dr. Ruiz who agrees to admit the patient.     2:14 PM Patient complained of pain. Ordered 4 mg of Morphine.       CRITICAL CARE  The very real possibilty of a deterioration of this patient's condition required the highest level of my preparedness for sudden, emergent intervention.  I provided critical care services, which included medication orders, frequent reevaluations of the patient's condition and response to treatment, ordering and reviewing test results, and discussing the case with various  consultants.  The critical care time associated with the care of the patient was 40 minutes. Review chart for interventions. This time is exclusive of any other billable procedures.       DISPOSITION:  Patient will be admitted to Dr. Ruiz in critical condition.      FINAL IMPRESSION  1. Pneumonia of left lower lobe due to infectious organism    2. Fever, unspecified fever cause       Critical care time of 40 minutes. This time is exclusive of any other billable procedures.       Amina BRODY (Lee), am scribing for, and in the presence of, Julito Amos M.D..  Electronically signed by: Amina Fraser (Lee), 6/22/2017  Julito BRODY M.D. personally performed the services described in this documentation, as scribed by Amina Fraser in my presence, and it is both accurate and complete.    The note accurately reflects work and decisions made by me.  Julito Amos  6/22/2017  3:57 PM

## 2017-06-22 NOTE — IP AVS SNAPSHOT
6/26/2017    Roxana Cuba  [address]  [place] [address]    Dear Roxana:    [facility] wants to ensure your discharge home is safe and you or your loved ones have had all of your questions answered regarding your care after you leave the hospital.    Below is a list of resources and contact information should you have any questions regarding your hospital stay, follow-up instructions, or active medical symptoms.    Questions or Concerns Regarding… Contact   Medical Questions Related to Your Discharge  (7 days a week, 8am-5pm) Contact a Nurse Care Coordinator   [phone]   Medical Questions Not Related to Your Discharge  (24 hours a day / 7 days a week)  Contact the Nurse Health Line   [phone]    Medications or Discharge Instructions Refer to your discharge packet   or contact your [facility] Primary Care Provider   [phone]   Follow-up Appointment(s) Schedule your appointment via [employer]   or contact Scheduling [phone]   Billing Review your statement via [employer]  or contact Billing [phone]   Medical Records Review your records via [employer]   or contact Medical Records [phone]     You may receive a telephone call within two days of discharge. This call is to make certain you understand your discharge instructions and have the opportunity to have any questions answered. You can also easily access your medical information, test results and upcoming appointments via the [employer] free online health management tool. You can learn more and sign up at [email or web address]. For assistance setting up your [employer] account, please call [phone].    Once again, we want to ensure your discharge home is safe and that you have a clear understanding of any next steps in your care. If you have any questions or concerns, please do not hesitate to contact us, we are here for you. Thank you for choosing [facility] for your healthcare needs.    Sincerely,    Your [facility] Healthcare Team

## 2017-06-22 NOTE — ED NOTES
Roxana Cuba  Chief Complaint   Patient presents with   • LUQ Pain     biba from home,  radiates into (L) clavicle.  increased pain with deep respirations.    • Head Ache     started yesterday on (L) side of head.      Pt in hospital gown, on monitor. Sepsis score of 3 noted,  Protocol initiated.    Call light in reach.    Pt states saw liver specialist yesterday and was told spleen enlarged.

## 2017-06-22 NOTE — IP AVS SNAPSHOT
" <p align=\"LEFT\"><IMG SRC=\"//[email or web address]$/[email or web address]\" alt=\"Image\" WIDTH=\"50%\" HEIGHT=\"200\" BORDER=\"\"></p>                   Name:Roxana Cuba  Medical Record Number:[id number]  CSN: [id number]    YOB: 1960   Age: 57 y.o.  Sex: female  HT:1.727 m (5' 8\") WT: 80 kg (176 lb 5.9 oz)          Admit Date: 6/22/2017     Discharge Date:   Today's Date: 6/26/2017  Attending Doctor:  Suzy Reese M.D.                  Allergies:  Rhubarb and Vancomycin hcl          Your appointments     Jul 03, 2017  8:00 AM   Adult Draw/Collection with [facility]   [facility] (--)    [address]  [place] [address]   [phone]            Jul 14, 2017 10:00 AM   Established Patient with Bernarda Reyes D.O.   [facility])    [address], [address]  [place] [id number] [phone]           You will be receiving a confirmation call a few days before your appointment from our automated call confirmation system.            Aug 08, 2017 10:00 AM   FOLLOW UP with Maxwell Phan M.D.   [facility] (--)    [address]  [place] [address] [phone]            Aug 09, 2017  8:00 AM   Adult Draw/Collection with [facility]   [facility] (--)    [address]  [place] [address]   [phone]            Aug 21, 2017  2:40 PM   Established Patient Pul with A Rotation   [facility] (--)    [address]  [address]  [place] [id number] [phone]            Sep 06, 2017  8:00 AM   Adult Draw/Collection with [facility]   [facility] (--)    [address]  [place] [address]   [phone]              Follow-up Information     1. Follow up with Bernarda Reyes D.O.. Schedule an appointment as soon as possible for a visit in 1 week.    Specialty:  Family Medicine    Why:  follow up    Contact information    [address]  [address]  [place] " [address]  [phone]           Medication List      Take these Medications        Instructions    ADCIRCA 20 MG Tabs   Generic drug:  Tadalafil (PAH)    Take 40 mg by mouth every bedtime. Indications: Pulmonary Arterial Hypertension   Dose:  40 mg       alprazolam 0.5 MG Tabs   Commonly known as:  XANAX    Doctor's comments:  Refill after 5/4/17   Take 1 Tab by mouth 3 times a day.   Dose:  0.5 mg       ambrisentan 10 MG tablet   Commonly known as:  LETAIRIS    Take 1 Tab by mouth every day.   Dose:  10 mg       ascorbic acid 500 MG Tabs   Commonly known as:  ascorbic acid    Take 500 mg by mouth every day.   Dose:  500 mg       aspirin EC 81 MG Tbec   Commonly known as:  ECOTRIN    Take 1 Tab by mouth every morning.   Dose:  81 mg       azithromycin 250 MG Tabs   Commonly known as:  ZITHROMAX    Take 1 Tab by mouth every day.   Dose:  250 mg       CELLCEPT 250 MG Caps   Generic drug:  mycophenolate    Take 500 mg by mouth 2 times a day.   Dose:  500 mg       CITRACAL MAXIMUM 315-250 MG-UNIT Tabs   Generic drug:  Calcium Citrate-Vitamin D    TAKE 2 TABS BY MOUTH 2X/ DAY WITH FOOD BEFORE AND AFTER DINNER FOR LIFE-CRUSH 1ST 3 MONTH, SPACE MVI       cyclobenzaprine 10 MG Tabs   Commonly known as:  FLEXERIL    Take 10 mg by mouth 3 times a day as needed.   Dose:  10 mg       docusate sodium 100 MG Caps   Commonly known as:  COLACE    Take 100 mg by mouth every day.   Dose:  100 mg       ferrous sulfate 220 (44 FE) MG/5ML Elix   Commonly known as:  FEOSOL    Take 5 mL by mouth every day.   Dose:  220 mg       fluticasone 50 MCG/ACT nasal spray   Commonly known as:  FLONASE    Spray 1 Spray in nose every day.   Dose:  1 Spray       gabapentin 600 MG tablet   Commonly known as:  NEURONTIN    Doctor's comments:  Authorization of a refill request.   Take 1 Tab by mouth 3 times a day.   Dose:  600 mg       levothyroxine 137 MCG Tabs   Commonly known as:  SYNTHROID    Take 1 Tab by mouth every day.   Dose:  137 mcg        Linaclotide 290 MCG Caps    Take 290 mg by mouth every day.   Dose:  290 mg       omeprazole 40 MG delayed-release capsule   Commonly known as:  PRILOSEC    Take 1 Cap by mouth every day.   Dose:  40 mg       ondansetron 4 MG Tbdp   Commonly known as:  ZOFRAN ODT    Take 1 Tab by mouth every 8 hours as needed for Nausea/Vomiting. Nausea and vomiting   Dose:  4 mg       oxycodone immediate release 10 MG immediate release tablet   Commonly known as:  ROXICODONE    Doctor's comments:  Prescription #3 refill after 6/22/17   Take 1-3 Tabs by mouth every 6 hours as needed for Moderate Pain.   Dose:  10-30 mg       pravastatin 20 MG Tabs   Commonly known as:  PRAVACHOL    Take 1 Tab by mouth every day.   Dose:  20 mg       predniSONE 5 MG Tabs   Commonly known as:  DELTASONE    Take 7 mg by mouth every morning.   Dose:  7 mg       PROBIOTIC DAILY PO    Take 1 Cap by mouth every day.   Dose:  1 Cap       sennosides 8.6 MG Tabs   Commonly known as:  SENOKOT    Take 17.2 mg by mouth 2 times a day.   Dose:  17.2 mg       sertraline 100 MG Tabs   Commonly known as:  ZOLOFT    Take 100 mg by mouth every bedtime.   Dose:  100 mg       tacrolimus 0.5 MG Caps   Commonly known as:  PROGRAF    Take 1.5 mg by mouth 2 times a day.   Dose:  1.5 mg       tiotropium 18 MCG Caps   Commonly known as:  SPIRIVA    Inhale 1 Cap by mouth every day.   Dose:  18 mcg       trazodone 50 MG Tabs   Commonly known as:  DESYREL    Take 1-2 Tabs by mouth at bedtime as needed for Sleep.   Dose:   mg         Ask your Physician about these medications        Instructions    * furosemide 40 MG Tabs   What changed:  Another medication with the same name was added. Make sure you understand how and when to take each.   Commonly known as:  LASIX   Ask about: Which instructions should I use?    Take 40 mg by mouth every morning.   Dose:  40 mg       * furosemide 40 MG Tabs   What changed:  You were already taking a medication with the same name, and this  prescription was added. Make sure you understand how and when to take each.   Commonly known as:  LASIX   Ask about: Which instructions should I use?    TAKE 1 TAB BY MOUTH EVERY DAY.       * Notice:  This list has 2 medication(s) that are the same as other medications prescribed for you. Read the directions carefully, and ask your doctor or other care provider to review them with you.

## 2017-06-22 NOTE — ED NOTES
Patient is resting comfortably. States pain is tolerable at this time,  Updated on POC.  at bedside.  Deny other needs at this time.

## 2017-06-22 NOTE — ED NOTES
BP continues to stay low, additionally bolus infusing.  Pt requesting more morphine, but informed pt that until BP increasing unable to give more narcotics.  Will cont to monitor.

## 2017-06-23 PROBLEM — R65.21 SEPTIC SHOCK(785.52): Status: ACTIVE | Noted: 2017-06-23

## 2017-06-23 PROBLEM — A41.9 SEPSIS, UNSPECIFIED: Status: RESOLVED | Noted: 2017-06-22 | Resolved: 2017-06-23

## 2017-06-23 PROBLEM — A41.9 SEPTIC SHOCK(785.52): Status: ACTIVE | Noted: 2017-06-23

## 2017-06-23 LAB
ANION GAP SERPL CALC-SCNC: 5 MMOL/L (ref 0–11.9)
BASOPHILS # BLD AUTO: 0.2 % (ref 0–1.8)
BASOPHILS # BLD: 0.01 K/UL (ref 0–0.12)
BUN SERPL-MCNC: 18 MG/DL (ref 8–22)
CALCIUM SERPL-MCNC: 8.1 MG/DL (ref 8.5–10.5)
CHLORIDE SERPL-SCNC: 110 MMOL/L (ref 96–112)
CO2 SERPL-SCNC: 26 MMOL/L (ref 20–33)
CREAT SERPL-MCNC: 0.94 MG/DL (ref 0.5–1.4)
EOSINOPHIL # BLD AUTO: 0 K/UL (ref 0–0.51)
EOSINOPHIL NFR BLD: 0 % (ref 0–6.9)
ERYTHROCYTE [DISTWIDTH] IN BLOOD BY AUTOMATED COUNT: 45.2 FL (ref 35.9–50)
GFR SERPL CREATININE-BSD FRML MDRD: >60 ML/MIN/1.73 M 2
GLUCOSE SERPL-MCNC: 242 MG/DL (ref 65–99)
HCT VFR BLD AUTO: 35.5 % (ref 37–47)
HGB BLD-MCNC: 11.8 G/DL (ref 12–16)
IMM GRANULOCYTES # BLD AUTO: 0.02 K/UL (ref 0–0.11)
IMM GRANULOCYTES NFR BLD AUTO: 0.4 % (ref 0–0.9)
LYMPHOCYTES # BLD AUTO: 0.34 K/UL (ref 1–4.8)
LYMPHOCYTES NFR BLD: 6.7 % (ref 22–41)
MCH RBC QN AUTO: 29.8 PG (ref 27–33)
MCHC RBC AUTO-ENTMCNC: 33.2 G/DL (ref 33.6–35)
MCV RBC AUTO: 89.6 FL (ref 81.4–97.8)
MONOCYTES # BLD AUTO: 0.09 K/UL (ref 0–0.85)
MONOCYTES NFR BLD AUTO: 1.8 % (ref 0–13.4)
NEUTROPHILS # BLD AUTO: 4.58 K/UL (ref 2–7.15)
NEUTROPHILS NFR BLD: 90.9 % (ref 44–72)
NRBC # BLD AUTO: 0 K/UL
NRBC BLD AUTO-RTO: 0 /100 WBC
PLATELET # BLD AUTO: 70 K/UL (ref 164–446)
PMV BLD AUTO: 9.1 FL (ref 9–12.9)
POTASSIUM SERPL-SCNC: 4.4 MMOL/L (ref 3.6–5.5)
RBC # BLD AUTO: 3.96 M/UL (ref 4.2–5.4)
SODIUM SERPL-SCNC: 141 MMOL/L (ref 135–145)
WBC # BLD AUTO: 5 K/UL (ref 4.8–10.8)

## 2017-06-23 PROCEDURE — A9270 NON-COVERED ITEM OR SERVICE: HCPCS | Performed by: INTERNAL MEDICINE

## 2017-06-23 PROCEDURE — 700111 HCHG RX REV CODE 636 W/ 250 OVERRIDE (IP)

## 2017-06-23 PROCEDURE — 700105 HCHG RX REV CODE 258: Performed by: INTERNAL MEDICINE

## 2017-06-23 PROCEDURE — A9270 NON-COVERED ITEM OR SERVICE: HCPCS | Performed by: HOSPITALIST

## 2017-06-23 PROCEDURE — 700111 HCHG RX REV CODE 636 W/ 250 OVERRIDE (IP): Performed by: INTERNAL MEDICINE

## 2017-06-23 PROCEDURE — A9270 NON-COVERED ITEM OR SERVICE: HCPCS | Performed by: PHARMACIST

## 2017-06-23 PROCEDURE — 85025 COMPLETE CBC W/AUTO DIFF WBC: CPT

## 2017-06-23 PROCEDURE — 700102 HCHG RX REV CODE 250 W/ 637 OVERRIDE(OP): Performed by: PHARMACIST

## 2017-06-23 PROCEDURE — 80048 BASIC METABOLIC PNL TOTAL CA: CPT

## 2017-06-23 PROCEDURE — 99233 SBSQ HOSP IP/OBS HIGH 50: CPT | Performed by: HOSPITALIST

## 2017-06-23 PROCEDURE — 700105 HCHG RX REV CODE 258

## 2017-06-23 PROCEDURE — 700102 HCHG RX REV CODE 250 W/ 637 OVERRIDE(OP): Performed by: HOSPITALIST

## 2017-06-23 PROCEDURE — 700102 HCHG RX REV CODE 250 W/ 637 OVERRIDE(OP): Performed by: INTERNAL MEDICINE

## 2017-06-23 PROCEDURE — 770022 HCHG ROOM/CARE - ICU (200)

## 2017-06-23 PROCEDURE — 700111 HCHG RX REV CODE 636 W/ 250 OVERRIDE (IP): Performed by: HOSPITALIST

## 2017-06-23 RX ORDER — LORAZEPAM 1 MG/1
1-2 TABLET ORAL EVERY 4 HOURS PRN
Status: DISCONTINUED | OUTPATIENT
Start: 2017-06-23 | End: 2017-06-26 | Stop reason: HOSPADM

## 2017-06-23 RX ORDER — TADALAFIL 20 MG/1
40 TABLET ORAL
Status: DISCONTINUED | OUTPATIENT
Start: 2017-06-23 | End: 2017-06-26 | Stop reason: HOSPADM

## 2017-06-23 RX ORDER — PREDNISONE 20 MG/1
20 TABLET ORAL DAILY
Status: DISCONTINUED | OUTPATIENT
Start: 2017-06-23 | End: 2017-06-25

## 2017-06-23 RX ORDER — MYCOPHENOLATE MOFETIL 250 MG/1
500 CAPSULE ORAL 2 TIMES DAILY
Status: DISCONTINUED | OUTPATIENT
Start: 2017-06-23 | End: 2017-06-26 | Stop reason: HOSPADM

## 2017-06-23 RX ORDER — FUROSEMIDE 40 MG/1
40 TABLET ORAL EVERY MORNING
Status: DISCONTINUED | OUTPATIENT
Start: 2017-06-23 | End: 2017-06-26 | Stop reason: HOSPADM

## 2017-06-23 RX ADMIN — SERTRALINE 100 MG: 100 TABLET, FILM COATED ORAL at 20:19

## 2017-06-23 RX ADMIN — TACROLIMUS 1.5 MG: 1 CAPSULE ORAL at 08:59

## 2017-06-23 RX ADMIN — OXYCODONE HYDROCHLORIDE 10 MG: 10 TABLET ORAL at 14:22

## 2017-06-23 RX ADMIN — GABAPENTIN 600 MG: 300 CAPSULE ORAL at 09:16

## 2017-06-23 RX ADMIN — MORPHINE SULFATE 4 MG: 4 INJECTION INTRAVENOUS at 08:23

## 2017-06-23 RX ADMIN — PREDNISONE 20 MG: 20 TABLET ORAL at 19:18

## 2017-06-23 RX ADMIN — PHENYLEPHRINE HYDROCHLORIDE 2 SPRAY: 0.5 SPRAY NASAL at 19:18

## 2017-06-23 RX ADMIN — FUROSEMIDE 40 MG: 40 TABLET ORAL at 19:18

## 2017-06-23 RX ADMIN — LEVOTHYROXINE SODIUM 137 MCG: 137 TABLET ORAL at 06:05

## 2017-06-23 RX ADMIN — MYCOPHENOLATE MOFETIL 500 MG: 250 CAPSULE ORAL at 20:18

## 2017-06-23 RX ADMIN — DEXAMETHASONE SODIUM PHOSPHATE 4 MG: 4 INJECTION, SOLUTION INTRAMUSCULAR; INTRAVENOUS at 11:28

## 2017-06-23 RX ADMIN — LACTOBACILLUS ACIDOPHILUS / LACTOBACILLUS BULGARICUS 1 PACKET: 100 MILLION CFU STRENGTH GRANULES at 11:29

## 2017-06-23 RX ADMIN — SODIUM CHLORIDE: 9 INJECTION, SOLUTION INTRAVENOUS at 08:18

## 2017-06-23 RX ADMIN — BISACODYL 10 MG: 10 SUPPOSITORY RECTAL at 20:19

## 2017-06-23 RX ADMIN — AMPICILLIN SODIUM 2000 MG: 2 INJECTION, POWDER, FOR SOLUTION INTRAMUSCULAR; INTRAVENOUS at 02:33

## 2017-06-23 RX ADMIN — AMPICILLIN SODIUM 2000 MG: 2 INJECTION, POWDER, FOR SOLUTION INTRAMUSCULAR; INTRAVENOUS at 05:34

## 2017-06-23 RX ADMIN — GABAPENTIN 600 MG: 300 CAPSULE ORAL at 20:19

## 2017-06-23 RX ADMIN — TIOTROPIUM BROMIDE 1 CAPSULE: 18 CAPSULE ORAL; RESPIRATORY (INHALATION) at 09:04

## 2017-06-23 RX ADMIN — OMEPRAZOLE 40 MG: 20 CAPSULE, DELAYED RELEASE ORAL at 09:13

## 2017-06-23 RX ADMIN — ALPRAZOLAM 0.5 MG: 0.5 TABLET ORAL at 15:16

## 2017-06-23 RX ADMIN — CEFTRIAXONE SODIUM 2 G: 2 INJECTION, POWDER, FOR SOLUTION INTRAMUSCULAR; INTRAVENOUS at 08:58

## 2017-06-23 RX ADMIN — AZITHROMYCIN FOR INJECTION INJECTION, POWDER, LYOPHILIZED, FOR SOLUTION 500 MG: 500 INJECTION INTRAVENOUS at 10:14

## 2017-06-23 RX ADMIN — ASPIRIN 81 MG: 81 TABLET ORAL at 09:16

## 2017-06-23 RX ADMIN — OXYCODONE HYDROCHLORIDE 10 MG: 10 TABLET ORAL at 17:42

## 2017-06-23 RX ADMIN — OXYCODONE HYDROCHLORIDE 10 MG: 10 TABLET ORAL at 11:28

## 2017-06-23 RX ADMIN — ALPRAZOLAM 0.5 MG: 0.5 TABLET ORAL at 06:27

## 2017-06-23 RX ADMIN — CEFTRIAXONE SODIUM 2 G: 2 INJECTION, POWDER, FOR SOLUTION INTRAMUSCULAR; INTRAVENOUS at 20:18

## 2017-06-23 RX ADMIN — PRAVASTATIN SODIUM 20 MG: 20 TABLET ORAL at 09:02

## 2017-06-23 RX ADMIN — OXYCODONE HYDROCHLORIDE 5 MG: 5 TABLET ORAL at 08:22

## 2017-06-23 RX ADMIN — MORPHINE SULFATE 4 MG: 4 INJECTION INTRAVENOUS at 11:28

## 2017-06-23 RX ADMIN — LACTOBACILLUS ACIDOPHILUS / LACTOBACILLUS BULGARICUS 1 PACKET: 100 MILLION CFU STRENGTH GRANULES at 17:47

## 2017-06-23 RX ADMIN — TRAZODONE HYDROCHLORIDE 50 MG: 50 TABLET ORAL at 00:54

## 2017-06-23 RX ADMIN — AMPICILLIN SODIUM 2000 MG: 2 INJECTION, POWDER, FOR SOLUTION INTRAMUSCULAR; INTRAVENOUS at 17:56

## 2017-06-23 RX ADMIN — TACROLIMUS 1.5 MG: 1 CAPSULE ORAL at 20:18

## 2017-06-23 RX ADMIN — AMPICILLIN SODIUM 2000 MG: 2 INJECTION, POWDER, FOR SOLUTION INTRAMUSCULAR; INTRAVENOUS at 13:55

## 2017-06-23 RX ADMIN — LACTOBACILLUS ACIDOPHILUS / LACTOBACILLUS BULGARICUS 1 PACKET: 100 MILLION CFU STRENGTH GRANULES at 07:42

## 2017-06-23 RX ADMIN — MORPHINE SULFATE 4 MG: 4 INJECTION INTRAVENOUS at 17:43

## 2017-06-23 RX ADMIN — MORPHINE SULFATE 4 MG: 4 INJECTION INTRAVENOUS at 14:21

## 2017-06-23 RX ADMIN — VANCOMYCIN HYDROCHLORIDE 1500 MG: 100 INJECTION, POWDER, LYOPHILIZED, FOR SOLUTION INTRAVENOUS at 21:15

## 2017-06-23 RX ADMIN — VANCOMYCIN HYDROCHLORIDE 1500 MG: 100 INJECTION, POWDER, LYOPHILIZED, FOR SOLUTION INTRAVENOUS at 10:14

## 2017-06-23 RX ADMIN — MAGNESIUM HYDROXIDE 30 ML: 400 SUSPENSION ORAL at 07:42

## 2017-06-23 RX ADMIN — TADALAFIL 40 MG: 20 TABLET ORAL at 21:00

## 2017-06-23 RX ADMIN — AMBRISENTAN 10 MG: 10 TABLET, FILM COATED ORAL at 09:00

## 2017-06-23 RX ADMIN — GABAPENTIN 600 MG: 300 CAPSULE ORAL at 15:17

## 2017-06-23 RX ADMIN — SODIUM CHLORIDE: 9 INJECTION, SOLUTION INTRAVENOUS at 01:35

## 2017-06-23 RX ADMIN — AMPICILLIN SODIUM 2000 MG: 2 INJECTION, POWDER, FOR SOLUTION INTRAMUSCULAR; INTRAVENOUS at 10:07

## 2017-06-23 RX ADMIN — MYCOPHENOLATE MOFETIL 500 MG: 250 CAPSULE ORAL at 09:02

## 2017-06-23 RX ADMIN — OXYCODONE HYDROCHLORIDE 10 MG: 10 TABLET ORAL at 21:16

## 2017-06-23 RX ADMIN — DEXAMETHASONE SODIUM PHOSPHATE 4 MG: 4 INJECTION, SOLUTION INTRAMUSCULAR; INTRAVENOUS at 06:05

## 2017-06-23 RX ADMIN — LORAZEPAM 2 MG: 1 TABLET ORAL at 22:30

## 2017-06-23 RX ADMIN — POLYETHYLENE GLYCOL 3350 1 PACKET: 17 POWDER, FOR SOLUTION ORAL at 11:28

## 2017-06-23 RX ADMIN — MORPHINE SULFATE 4 MG: 4 INJECTION INTRAVENOUS at 21:16

## 2017-06-23 RX ADMIN — LORAZEPAM 2 MG: 1 TABLET ORAL at 18:31

## 2017-06-23 RX ADMIN — MORPHINE SULFATE 4 MG: 4 INJECTION INTRAVENOUS at 05:23

## 2017-06-23 ASSESSMENT — ENCOUNTER SYMPTOMS
NECK PAIN: 0
DOUBLE VISION: 0
HEADACHES: 1
NAUSEA: 0
FEVER: 1
COUGH: 0
VOMITING: 0
CHILLS: 0
BLURRED VISION: 0
SHORTNESS OF BREATH: 0

## 2017-06-23 ASSESSMENT — COPD QUESTIONNAIRES
COPD SCREENING SCORE: 1
DO YOU EVER COUGH UP ANY MUCUS OR PHLEGM?: NO/ONLY WITH OCCASIONAL COLDS OR INFECTIONS
DURING THE PAST 4 WEEKS HOW MUCH DID YOU FEEL SHORT OF BREATH: NONE/LITTLE OF THE TIME
HAVE YOU SMOKED AT LEAST 100 CIGARETTES IN YOUR ENTIRE LIFE: NO/DON'T KNOW

## 2017-06-23 ASSESSMENT — LIFESTYLE VARIABLES
EVER_SMOKED: NEVER
DO YOU DRINK ALCOHOL: NO
ALCOHOL_USE: NO

## 2017-06-23 ASSESSMENT — COGNITIVE AND FUNCTIONAL STATUS - GENERAL
CLIMB 3 TO 5 STEPS WITH RAILING: A LITTLE
SUGGESTED CMS G CODE MODIFIER DAILY ACTIVITY: CJ
DRESSING REGULAR LOWER BODY CLOTHING: A LITTLE
HELP NEEDED FOR BATHING: A LITTLE
DAILY ACTIVITIY SCORE: 21
TOILETING: A LITTLE
WALKING IN HOSPITAL ROOM: A LITTLE
SUGGESTED CMS G CODE MODIFIER MOBILITY: CJ
MOBILITY SCORE: 22

## 2017-06-23 ASSESSMENT — PAIN SCALES - GENERAL
PAINLEVEL_OUTOF10: 10
PAINLEVEL_OUTOF10: 5
PAINLEVEL_OUTOF10: 7
PAINLEVEL_OUTOF10: 5
PAINLEVEL_OUTOF10: 8
PAINLEVEL_OUTOF10: 8
PAINLEVEL_OUTOF10: 10
PAINLEVEL_OUTOF10: 7
PAINLEVEL_OUTOF10: 10
PAINLEVEL_OUTOF10: 7
PAINLEVEL_OUTOF10: 7
PAINLEVEL_OUTOF10: 9
PAINLEVEL_OUTOF10: 8
PAINLEVEL_OUTOF10: 8
PAINLEVEL_OUTOF10: 10
PAINLEVEL_OUTOF10: 7

## 2017-06-23 NOTE — ASSESSMENT & PLAN NOTE
Source remains cryptic, off pressors   IV fluids given.  Broad spectrum IV abx, ID following  CSF negative, blood cx NGTD

## 2017-06-23 NOTE — OR SURGEON
Immediate Post- Operative Note        PostOp Diagnosis: Headache and DDD    Procedure(s): LP - 8 cc clear CSF obtained    Estimated Blood Loss: Less than 5 ml    Complications: None      6/22/2017     8:10 PM     Bakari Woodson

## 2017-06-23 NOTE — PROGRESS NOTES
Spoke and updated Dr. Alexis regarding pt's overwhelming concern about meds in her med rec that are not scheduled in the MAR. Dr. Alexis stated he did not want to add these mediations to the MAR at this time. Also, mentioned to Dr. Alexis regarding pt's pain being poorly controlled and pt's request for morphine PCA. Dr. Alexis also did not want to order this at this time. Will continue to attempt to control pain with meds that are already ordered, as well as using nonpharm pain relief methods.

## 2017-06-23 NOTE — PROGRESS NOTES
[facility] Hospitalist Progress Note    Date of Service: 2017    Chief Complaint  57 y.o. female admitted 2017 with fever    Interval Problem Update  Ms. Cuba has a hx of liver transplant on chronic immunosuppressant therapy.  She was admitted to the ICU due to septic shock requiring IV pressors. I discussed with Dr. Villegas today about her condition.   Consultants/Specialty  ID     Disposition  ICU        Review of Systems   Constitutional: Positive for fever. Negative for chills.   Eyes: Negative for blurred vision and double vision.   Respiratory: Negative for cough and shortness of breath.    Cardiovascular: Negative for chest pain and leg swelling.   Gastrointestinal: Negative for nausea and vomiting.        LUQ pain.    Genitourinary: Negative for dysuria.   Musculoskeletal: Negative for neck pain.   Neurological: Positive for headaches.      Physical Exam  Laboratory/Imaging   Hemodynamics  Temp (24hrs), Av.9 °C (98.5 °F), Min:36.7 °C (98 °F), Max:38.1 °C (100.6 °F)   Temperature: 36.8 °C (98.2 °F)  Pulse  Av  Min: 53  Max: 98 Heart Rate (Monitored): 73  Blood Pressure: (!) 94/58 mmHg, NIBP: 115/58 mmHg      Respiratory      Respiration: 15, Pulse Oximetry: 96 %, O2 Daily Delivery Respiratory : Nasal Cannula        RUL Breath Sounds: Clear, RML Breath Sounds: Clear, RLL Breath Sounds: Diminished, MANJINDER Breath Sounds: Clear, LLL Breath Sounds: Diminished    Fluids    Intake/Output Summary (Last 24 hours) at 17 1201  Last data filed at 17 0800   Gross per 24 hour   Intake 2525.62 ml   Output   3125 ml   Net -599.38 ml       Nutrition  Orders Placed This Encounter   Procedures   • Diet Order     Standing Status: Standing      Number of Occurrences: 1      Standing Expiration Date:      Order Specific Question:  Diet:     Answer:  Diabetic [3]     Physical Exam   Constitutional: She is oriented to person, place, and time.   Neck: Neck supple.   Right IJ central line without bleeding.     Cardiovascular: Normal rate and regular rhythm.    No murmur heard.  Pulmonary/Chest: Effort normal. No respiratory distress. She has rales.   Abdominal: Soft. There is no rebound.   Mild LUQ tenderness.   Musculoskeletal: She exhibits edema. She exhibits no tenderness.   Neurological: She is alert and oriented to person, place, and time.   Skin: There is pallor.   Scabs bilat proximal shins.    Psychiatric: She has a normal mood and affect. Her behavior is normal.       Recent Labs      06/22/17   1300  06/23/17   0530   WBC  6.6  5.0   RBC  3.96*  3.96*   HEMOGLOBIN  11.5*  11.8*   HEMATOCRIT  34.6*  35.5*   MCV  87.4  89.6   MCH  29.0  29.8   MCHC  33.2*  33.2*   RDW  43.0  45.2   PLATELETCT  56*  70*   MPV  9.3  9.1     Recent Labs      06/22/17   1300  06/23/17   0530   SODIUM  136  141   POTASSIUM  4.1  4.4   CHLORIDE  102  110   CO2  26  26   GLUCOSE  185*  242*   BUN  23*  18   CREATININE  1.14  0.94   CALCIUM  9.2  8.1*     Recent Labs      06/22/17   1300  06/22/17   1925   APTT  27.9  30.3   INR  1.02  1.09                  Assessment/Plan     Septic shock (CMS-HCC) (present on admission)  Assessment & Plan  Source remains cryptic.  IV fluids given. IV levophed has been utilized.   Broad spectrum IV abx.   Infectious dz consult by [facility].   LP negative.  CT chest/abd/pelvis:  1.  Reticular and groundglass densities in the right lower lobe likely represent an infectious or inflammatory process. Follow-up imaging is recommended to document resolution.    2.  Left lower lobe opacity with air bronchograms may represent atelectasis or pneumonia.    3.  Atherosclerosis.    4.  Persistent splenomegaly with hypodense lesions    Status post liver transplant Dr. Canada ([facility]) (present on admission)  Assessment & Plan  On cellcept and prograf.    Sarcoidosis (CMS-HCC) (present on admission)  Assessment & Plan  Hx of. Followed by pulmonary.    Immunocompromised state (CMS-HCC) (present on  admission)  Assessment & Plan  Secondary to immunosuppressants     Thrombocytopenia (CMS-HCC) (present on admission)  Assessment & Plan  Chronic     IDDM (insulin dependent diabetes mellitus) (CMS-HCC) (present on admission)  Assessment & Plan  On home insulin    COPD (chronic obstructive pulmonary disease) (CMS-HCC) (present on admission)  Assessment & Plan  She has been followed by pulmonology in the past.    Pulmonary hypertension (CMS-HCC) (present on admission)  Assessment & Plan  Followed by Dr. Phan.  On Tadalafil and Letairis.      Labs reviewed and Medications reviewed  Holbrook catheter: No Holbrook

## 2017-06-23 NOTE — PROGRESS NOTES
Report received from ER RN. Pt arrived to unit approx 2055 via gurney, monitored, with ICU RN and tech. Transport uneventful. Pt's  at bedside. Pt c/o 8/10 pain in LUQ. Pt medicated per MAR. VSS, Levo gtt at 5mcg/min. Pt on 6L NC. Maintenance fluids and IV ABx started. Scheduled meds given. Pt got up to the commode x1 assist. 600cc clear, yellow urine. POC discussed, communication board updated. CHG and linen change completed. Bed in lowest and locked position, bed alarm in use, call light within reach.

## 2017-06-23 NOTE — ED NOTES
Pt returned from IR,  Levophed infusing,  Medicated per MAR for pain.  Updated on POC/ room assignment. Call light in reach.

## 2017-06-23 NOTE — PROCEDURES
Central Venous Catheter (CVC, Central Line) Placement  Date: 6/22/17    Indication: Hemodynamic monitoring/Intravenous access/Vasopressor support    A time-out was completed verifying correct patient, procedure, site,  positioning, and special equipment if applicable. The patient was  placed in a dependent position appropriate for central line placement  based on the vein to be cannulated. The patient’s left  Neck was prepped and draped in sterile fashion. 1%  Lidocaine was used to anesthetize the surrounding skin area. A triple  lumen catheter was introduced into the the  internal jugular vein using the Seldinger  technique and under ultrasound guidance. The catheter was threaded  smoothly over the guide wire and appropriate blood return was  obtained. Each lumen of the catheter was evacuated of air and flushed  with sterile saline. The catheter was then sutured in place to the  skin and a sterile dressing applied. Estimated Blood Loss: Less then 5 cc    The patient tolerated the procedure well and there were no complications. A chest xray was ordered to verify placement of the catheter.

## 2017-06-23 NOTE — PROGRESS NOTES
Pharmacy Kinetics 57 y.o. female on vancomycin day # 1   2017    Currently on Vancomycin 1900 mg IV x1 followed by 1500 mg IV q12h    Indication for Treatment: rule out meningitis    Pertinent history per medical record: Admitted on 2017 for sepsis. Patient with multiple recent admits. She does have a h/o liver transplant on immunosuppressants including steroids. She presents to ED today with LUQ abdominal pain and HA with associated fever up to 103 at home. She does also have increased WOB.    Other antibiotics:  Ceftriaxone 2 g IV q12     Ampicillin 2 g IV q4h     Azithromycin 500 mg IV q24h    Allergies: Rhubarb and Vancomycin hcl     List concerns for renal function: chest/abd/pelvis CT w/contrast    Pertinent cultures to date:   17: Blood, peripheral x2 = in process  17:  Urine = in process  17:  Fluid, CSF = no organisms seen on gram stain     Recent Labs      17   1300   WBC  6.6   NEUTSPOLYS  87.60*     Recent Labs      17   1300   BUN  23*   CREATININE  1.14   ALBUMIN  3.9     Blood pressure 94/58, pulse 62, temperature 38.1 °C (100.6 °F), resp. rate 28, weight 77.111 kg (170 lb), last menstrual period 2000, SpO2 94 %. Temp (24hrs), Av.1 °C (100.6 °F), Min:38.1 °C (100.6 °F), Max:38.1 °C (100.6 °F)      A/P   1. Vancomycin dose change: new start  2. Next vancomycin level:  at 0830  3. Goal trough: 18-22 mcg/mL  4. Comments:  Patient with low grade fever along with HA and neck stiffness, no photophobia noted. She is on chronic steroid therapy for her liver transplant, d/w MD that dexamethasone not helpful in this setting given timing of abx, we would like to continue anyway. Patient was given numerous boluses and ultimately ended up on low dose norepi. LP not overly concerning for CNS infection. Would recommend rapid de-escalation of abx. Of note, PCT was only 0.05.    Armaan Arias, PharmD, BCPS

## 2017-06-23 NOTE — PROGRESS NOTES
Pt is very upset that she was not able to take her immunosuppressant medications. Educated pt per Dr. Ruiz's note why immunosuppressants were held at this time. Charge RN, Mirela made aware and also re-educated and provided comfort. Will inform dayshift RN as well as Dr. Real in the morning during change of shift.

## 2017-06-23 NOTE — PROGRESS NOTES
Informed Dr. Real regarding pt's desire to speak with MD related to pt's held immunosuppressant medications. Dr. Real to talk with pt this am. New med orders received.

## 2017-06-23 NOTE — H&P
DATE OF SERVICE:  06/22/2017    ADMITTING ATTENDING:  Brad Ruiz MD    PRIMARY CARE PHYSICIAN:  Bernarda Damon DO    CHIEF COMPLAINT:  Headache, left upper quadrant pain and fever.    HISTORY OF PRESENT ILLNESS:  This is a 57-year-old female with a past medical   history of liver transplant, on immunosuppressants, diabetes mellitus type 2,   hypertension, COPD, pulmonary arterial hypertension who presented with fever,   left upper quadrant pain and headache since the past 2 days.  Patient states   that she has developed a left upper abdominal pain that radiated to her upper   back and chest, which she rates at 10/10.  She states that the pain is   worsened with breathing.  She does report a history of splenomegaly.  She   notes that she had a T-max of 103 yesterday.  She also notes a headache and   neck stiffness.  She also reports worsening shortness of breath.  She denies   any cough.  She denies any nausea, vomiting or diarrhea.  She reports   difficulty urinating; however, denies any dysuria.    In the ER the patient was found to have a temperature of 100.6.  Her blood   pressure was low at 94/58.  Her white count was 6.6, hemoglobin at 11.5,   platelet count 56.    A stat CT head revealed no acute intracranial findings, bifrontal atrophy and   ethmoid and sphenoid sinus disease.  A CT chest, abdomen, and pelvis revealed   reticular and ground glass densities in the right lower lobe.  Left lower lobe   opacity, which may represent pneumonia.  Persistent splenomegaly and a 3 cm   hyperdense area which is unchanged.  The patient was started on IV fluids per   septic protocol; however, she remained hypotensive despite receiving 3 liters   of normal saline.  I placed a central line in the ER and started the patient   on Levophed.  I ordered a lumbar puncture by IR for evaluation of possible   meningitis given her sepsis, headache and neck pain.  Patient will be started   on empiric antibiotics.    REVIEW OF  SYSTEMS:  Please see HPI, all other systems were reviewed and are   negative.    PAST MEDICAL HISTORY:  1.  Cirrhosis, status post liver transplant.  2.  GERD.  3.  COPD.  4.  Chronic kidney disease.  5.  Low back pain.  6.  Splenomegaly.  7.  Oxygen dependent on 4 liters.  8.  Pulmonary hypertension.  9.  Diabetes.  10.  Hypothyroidism.    PAST SURGICAL HISTORY:  Laparoscopic cholecystectomy, hysterectomy,   hemorrhoidectomy, bone marrow biopsy, colonoscopy, tonsillectomy, lung biopsy,   opened.    CURRENT OUTPATIENT MEDICATIONS:  Ferrous sulfate 220 mg every day, oxycodone   10 mg 1-2 tabs every 6 hours as needed, linaclotide 290 mg every day,   tacrolimus, Prograf 1.5 mg b.i.d., ascorbic acid 500 mg daily, Senokot 17.2 mg   b.i.d., Zofran 4 mg every 8 hours as needed, Colace 100 mg every day,   CellCept 500 mg b.i.d., Lasix 40 mg every morning, Flexeril 10 mg t.i.d.   p.r.n., Xanax 0.5 mg t.i.d. probiotic daily, aspirin 81 mg daily, Neurontin   600 mg t.i.d., Synthroid 137 mcg daily, Prilosec 40 mg daily, Desyrel    mg at bedtime for sleep, Spiriva 18 mcg tab daily, Flonase 50 mcg 1 spray in   the nose daily, prednisone 7 mg by mouth every morning, sertraline, Zoloft 100   mg every bedtime, ambrisentan 10 mg daily, tadalafil 40 mg every bedtime,   Pravachol 20 mg daily.    MEDICATION ALLERGIES:  RHUBARB ANAPHYLAXIS, VANCOMYCIN CAUSES RED MAN SYNDROME   AND CONFUSED TOO FAST.    FAMILY HISTORY:  Diabetes, heart disease, hypertension, stroke in father.    SOCIAL HISTORY:  Patient is a never smoker.  Denies alcohol or illicit drug   use.    PHYSICAL EXAMINATION:  VITAL SIGNS:  BMI is 25, blood pressure is 94/58, pulse of 59, temperature is   38, respiratory rate is 16, oxygen saturation is 95% on 6 liters.  CONSTITUTIONAL:  Well-developed, well-nourished female who appears toxic and   in significant pain.  HENMT:  Normocephalic, atraumatic.  Oral mucous membranes dry.  EYES:  PERRLA. Extraocular muscles  intact.  No conjunctival discharge.  NECK:  Supple without lymphadenopathy.  CARDIOVASCULAR:  Normal heart rate, normal rhythm.  No murmurs audible.  LUNGS:  Bibasilar crackles, left greater than right with increased respiratory   effort.  No wheezing.  ABDOMEN:  Soft, mild left upper quadrant tenderness.  No guarding or rebound.    Bowel sounds are audible.  EXTREMITIES:  Radial and dorsalis pedis pulses are intact.  No cyanosis or   edema.  SKIN:  Warm, dry without erythema or rash.  NEUROLOGIC:  Alert and oriented to time, place, and person.  Strength and   sensation intact.  No focal deficits.  Cranial nerves II-XII normal.    Meningeal signs positive.  PSYCHIATRIC:  No anxiety or depression.    PERTINENT LABORATORY:  Lactic acid 1.6.  WBC 6.6, hemoglobin 11.5, hematocrit   34.6, platelet count 56.  Sodium 136, potassium 4.1, chloride 102, bicarbonate   26, anion gap 8, glucose 185, BUN 23, creatinine 1.14, calcium 9.2, AST 16,   ALT 14, alkaline phosphatase 24, total bilirubin 0.4, albumin 3.9.  INR is   1.02.  Influenza is negative.  UA is negative for infection.    PERTINENT IMAGING REVIEW:  As in HPI.    ASSESSMENT AND PLAN:  1.  Septic shock -- The patient will be admitted to the ICU with close cardiac   monitoring and management that is expected to take greater than 2 midnights.    Likely source of sepsis is pneumonia and probable meningitis.  Patient has   been started on IV fluids per septic protocol.  The patient has persistent   hypotension and has been started on Levophed, we will titrate Levophed to aim   for a MAP of greater than 65.  Given patient uses chronic steroids we will   also start the patient on dexamethasone.  Blood cultures have been sent an LP   has been ordered.  Patient will be started on empiric antibiotics for   meningitis and pneumonia.  We will continue to monitor the patient's vitals   closely.  2.  Community-acquired pneumonia --  The patient has been started on   ceftriaxone  and azithromycin.  Blood cultures have been sent.  The patient   will receive supplemental oxygen and RT protocol.  3.  Concern for meningitis -- given the patient's sepsis, headache and neck   pain there is concern for meningitis, we will send the patient for LP.    Patient has been started empirically on dexamethasone, ceftriaxone, vancomycin   and ampicillin.  Blood cultures have been sent.  CT of the head is negative   for acute process.  We will monitor the patient closely in the ICU.  4.  History of liver transplant.  At this time, I will hold her   immunosuppressants including tacrolimus and mycophenolate, given her septic   shock.  We will consider contacting her liver transplant specialist in the   morning to discuss further care.  5.  Splenomegaly -- as seen on imaging patient has splenomegaly measuring 16.3   cm.  There is no evidence of splenic rupture or splenic abscess.  The patient   does note significant left upper quadrant pain, which could be explained by   her splenomegaly.  She also has significant thrombocytopenia, which again   could be explained by her splenomegaly.  We will consider a general surgery   consultation for a possible indication for splenectomy.  6.  Chronic obstructive pulmonary disease.  This is stable at this time.    Patient does not have any active wheezing or cough.  We will continue the   patient on her inhalers.  We will also provider with supplemental oxygen and   breathing treatments p.r.n.  7.  Pulmonary hypertension.  We will continue the patient on supplemental   oxygen and ambrisentan.  At this time, I will hold off on tadalafil, given her   hypotension.  8.  Hypothyroidism.  We will continue the patient on Synthroid.  We will check   a TSH.  9.  Gastroesophageal reflux disease.  Patient will be continued on Prilosec.  10.  Thrombocytopenia.  This is chronic.  There is no active evidence of   bleeding.  We will continue to monitor patient's CBC.  11.  Deep venous  thrombosis prophylaxis with sequential compression devices.  12.  CODE STATUS:  Full code.    I spent a total of 45 minutes of critical care time during this clinical encounter of which > 50% was devoted to counseling and coordinating care including review of records, pertinent lab data and studies, as well as discussing diagnostic evaluation and work up, planned therapeutic interventions and future disposition of care. Where indicated, the assessment and plan reflect discussion of patient with consultants, other healthcare providers, family members, and additional research needed to obtain further information in formulating the plan of care of this patient. This time includes no procedures or overlap with other providers.       ____________________________________     MD ASIM Alejandra / SAVAGE    DD:  06/22/2017 20:01:12  DT:  06/22/2017 20:52:37    D#:  [id number]  Job#:  [id number]

## 2017-06-23 NOTE — CARE PLAN
Problem: Safety  Goal: Will remain free from injury  Outcome: PROGRESSING AS EXPECTED  Bed locked and in lowest position, bed alarm in use, call light within reach. Nonskid socks used when transferring to bedside commode. Pt's room close to nurses station. Hourly rounding in place.     Problem: Pain Management  Goal: Pain level will decrease to patient’s comfort goal  Outcome: PROGRESSING SLOWER THAN EXPECTED  Pain assessed q2h using 0-10 scale. Pt's pain mainly located LUQ and radiates to L clavicle. Pt medicated per MAR as needed. Nonpharm pain relief methods in use. Education provided.

## 2017-06-23 NOTE — PROGRESS NOTES
Pharmacy Kinetics 57 y.o. female on vancomycin day # 2  2017    Currently on Vancomycin 1500 mg iv q12hr ()   : Vanco load 1900 mg IV at 2110    Indication for Treatment: Empiric - sepsis d/t CNS infection vs PNA    Pertinent history per medical record: Admitted on 2017 for sepsis. Patient with multiple recent admits to hospital. She has a h/o liver transplant on immunosuppressants including steroids. She presented to ED with LUQ abdominal pain and HA with reported fever up to 103 at home. Imaging revealed lung opacities, and patient had symptoms of meningitis, thus empiric antibiotics were initiated for treatment of sepsis with unknown source for infection.    Other antibiotics: ampicillin 2000 mg IV q4hrs, azithromycin 500 mg IV daily, ceftriaxone 2 gm IV q12hrs    Allergies: Rhubarb and Vancomycin hcl     Concerns for renal function include hypotension requiring vasopressor support & contrast for CT .    Pertinent cultures to date:   : sputum cx - not yet collected  : CSF cx - No organisms seen  : urine cx - NGTD  : peripheral blood cx X2 - NGTD  : influenza PCR - Negative    Imagin/22: CT chest/abd/pelvis - Reticular and groundglass densities in the right lower lobe likely represent an infectious or inflammatory process. Left lower lobe opacity with air bronchograms may represent atelectasis or pneumonia.    Recent Labs      17   1300  17   0530   WBC  6.6  5.0   NEUTSPOLYS  87.60*  90.90*     Recent Labs      17   1300  17   0530   BUN  23*  18   CREATININE  1.14  0.94   ALBUMIN  3.9   --      No results for input(s): VANCOTROUGH, VANCOPEAK, VANCORANDOM in the last 72 hours.    Intake/Output Summary (Last 24 hours) at 17 1351  Last data filed at 17 1200   Gross per 24 hour   Intake 3125.62 ml   Output   3875 ml   Net -749.38 ml      Blood pressure 94/58, pulse 57, temperature 36.9 °C (98.4 °F), resp. rate 23, height 1.727 m (5'  "8\"), weight 80 kg (176 lb 5.9 oz), last menstrual period 2000, SpO2 94 %. Temp (24hrs), Av.8 °C (98.3 °F), Min:36.7 °C (98 °F), Max:37.1 °C (98.7 °F)      A/P   1. Vancomycin dose change: Continue current regimen  2. Next vancomycin level:  at 0830  3. Goal trough: 18-22 mcg/mL pending identification of infectious source  4. Comments: Patient remains on empiric antibiotics for treatment of sepsis with unclear etiology, all cultures negative thus far. Patient afebrile following admission and no leukocytosis appreciated. Patient's renal indices improved following fluid resuscitation and vasopressors titrated off this AM. Will continue current vanco regimen at this time and plan trough tomorrow AM to evaluate and adjust as necessary. Will continue to follow cultures and recommend de-escalation of antibiotics if continued MRSA coverage is no longer indicated.    Pharmacy will continue to follow.     Amanda Fuller, PharmD          "

## 2017-06-24 PROBLEM — A41.9 SEPSIS, UNSPECIFIED: Status: ACTIVE | Noted: 2017-06-24

## 2017-06-24 LAB
ANION GAP SERPL CALC-SCNC: 6 MMOL/L (ref 0–11.9)
BACTERIA UR CULT: NORMAL
BASOPHILS # BLD AUTO: 0.2 % (ref 0–1.8)
BASOPHILS # BLD: 0.01 K/UL (ref 0–0.12)
BUN SERPL-MCNC: 18 MG/DL (ref 8–22)
CALCIUM SERPL-MCNC: 8.1 MG/DL (ref 8.5–10.5)
CHLORIDE SERPL-SCNC: 111 MMOL/L (ref 96–112)
CO2 SERPL-SCNC: 25 MMOL/L (ref 20–33)
CREAT SERPL-MCNC: 0.93 MG/DL (ref 0.5–1.4)
EOSINOPHIL # BLD AUTO: 0 K/UL (ref 0–0.51)
EOSINOPHIL NFR BLD: 0 % (ref 0–6.9)
ERYTHROCYTE [DISTWIDTH] IN BLOOD BY AUTOMATED COUNT: 44.4 FL (ref 35.9–50)
GFR SERPL CREATININE-BSD FRML MDRD: >60 ML/MIN/1.73 M 2
GLUCOSE SERPL-MCNC: 255 MG/DL (ref 65–99)
HCT VFR BLD AUTO: 33.2 % (ref 37–47)
HGB BLD-MCNC: 11 G/DL (ref 12–16)
IMM GRANULOCYTES # BLD AUTO: 0.02 K/UL (ref 0–0.11)
IMM GRANULOCYTES NFR BLD AUTO: 0.4 % (ref 0–0.9)
LYMPHOCYTES # BLD AUTO: 0.58 K/UL (ref 1–4.8)
LYMPHOCYTES NFR BLD: 11.3 % (ref 22–41)
MCH RBC QN AUTO: 29.5 PG (ref 27–33)
MCHC RBC AUTO-ENTMCNC: 33.1 G/DL (ref 33.6–35)
MCV RBC AUTO: 89 FL (ref 81.4–97.8)
MONOCYTES # BLD AUTO: 0.25 K/UL (ref 0–0.85)
MONOCYTES NFR BLD AUTO: 4.9 % (ref 0–13.4)
NEUTROPHILS # BLD AUTO: 4.26 K/UL (ref 2–7.15)
NEUTROPHILS NFR BLD: 83.2 % (ref 44–72)
NRBC # BLD AUTO: 0 K/UL
NRBC BLD AUTO-RTO: 0 /100 WBC
PLATELET # BLD AUTO: 68 K/UL (ref 164–446)
PMV BLD AUTO: 9.7 FL (ref 9–12.9)
POTASSIUM SERPL-SCNC: 4.3 MMOL/L (ref 3.6–5.5)
RBC # BLD AUTO: 3.73 M/UL (ref 4.2–5.4)
SIGNIFICANT IND 70042: NORMAL
SITE SITE: NORMAL
SODIUM SERPL-SCNC: 142 MMOL/L (ref 135–145)
SOURCE SOURCE: NORMAL
VANCOMYCIN TROUGH SERPL-MCNC: 28 UG/ML (ref 10–20)
WBC # BLD AUTO: 5.1 K/UL (ref 4.8–10.8)

## 2017-06-24 PROCEDURE — A9270 NON-COVERED ITEM OR SERVICE: HCPCS | Performed by: INTERNAL MEDICINE

## 2017-06-24 PROCEDURE — 770001 HCHG ROOM/CARE - MED/SURG/GYN PRIV*

## 2017-06-24 PROCEDURE — 80202 ASSAY OF VANCOMYCIN: CPT

## 2017-06-24 PROCEDURE — A9270 NON-COVERED ITEM OR SERVICE: HCPCS | Performed by: PHARMACIST

## 2017-06-24 PROCEDURE — 85025 COMPLETE CBC W/AUTO DIFF WBC: CPT

## 2017-06-24 PROCEDURE — 700111 HCHG RX REV CODE 636 W/ 250 OVERRIDE (IP): Performed by: INTERNAL MEDICINE

## 2017-06-24 PROCEDURE — A9270 NON-COVERED ITEM OR SERVICE: HCPCS | Performed by: HOSPITALIST

## 2017-06-24 PROCEDURE — 99232 SBSQ HOSP IP/OBS MODERATE 35: CPT | Performed by: HOSPITALIST

## 2017-06-24 PROCEDURE — 770006 HCHG ROOM/CARE - MED/SURG/GYN SEMI*

## 2017-06-24 PROCEDURE — 700111 HCHG RX REV CODE 636 W/ 250 OVERRIDE (IP): Performed by: HOSPITALIST

## 2017-06-24 PROCEDURE — 700102 HCHG RX REV CODE 250 W/ 637 OVERRIDE(OP): Performed by: INTERNAL MEDICINE

## 2017-06-24 PROCEDURE — 700105 HCHG RX REV CODE 258: Performed by: INTERNAL MEDICINE

## 2017-06-24 PROCEDURE — 80048 BASIC METABOLIC PNL TOTAL CA: CPT

## 2017-06-24 PROCEDURE — 700102 HCHG RX REV CODE 250 W/ 637 OVERRIDE(OP): Performed by: PHARMACIST

## 2017-06-24 PROCEDURE — 700102 HCHG RX REV CODE 250 W/ 637 OVERRIDE(OP): Performed by: HOSPITALIST

## 2017-06-24 RX ORDER — TRAZODONE HYDROCHLORIDE 50 MG/1
50 TABLET ORAL
Status: DISCONTINUED | OUTPATIENT
Start: 2017-06-24 | End: 2017-06-26 | Stop reason: HOSPADM

## 2017-06-24 RX ORDER — TRAZODONE HYDROCHLORIDE 50 MG/1
100 TABLET ORAL
Status: DISCONTINUED | OUTPATIENT
Start: 2017-06-24 | End: 2017-06-26 | Stop reason: HOSPADM

## 2017-06-24 RX ADMIN — MYCOPHENOLATE MOFETIL 500 MG: 250 CAPSULE ORAL at 09:16

## 2017-06-24 RX ADMIN — OXYCODONE HYDROCHLORIDE 10 MG: 10 TABLET ORAL at 18:18

## 2017-06-24 RX ADMIN — LORAZEPAM 2 MG: 1 TABLET ORAL at 14:30

## 2017-06-24 RX ADMIN — PRAVASTATIN SODIUM 20 MG: 20 TABLET ORAL at 09:16

## 2017-06-24 RX ADMIN — STANDARDIZED SENNA CONCENTRATE AND DOCUSATE SODIUM 2 TABLET: 8.6; 5 TABLET, FILM COATED ORAL at 09:16

## 2017-06-24 RX ADMIN — SERTRALINE 100 MG: 100 TABLET, FILM COATED ORAL at 20:14

## 2017-06-24 RX ADMIN — OMEPRAZOLE 40 MG: 20 CAPSULE, DELAYED RELEASE ORAL at 09:16

## 2017-06-24 RX ADMIN — FUROSEMIDE 40 MG: 40 TABLET ORAL at 09:17

## 2017-06-24 RX ADMIN — LACTOBACILLUS ACIDOPHILUS / LACTOBACILLUS BULGARICUS 1 PACKET: 100 MILLION CFU STRENGTH GRANULES at 07:30

## 2017-06-24 RX ADMIN — LEVOTHYROXINE SODIUM 137 MCG: 137 TABLET ORAL at 06:28

## 2017-06-24 RX ADMIN — OXYCODONE HYDROCHLORIDE 10 MG: 10 TABLET ORAL at 06:28

## 2017-06-24 RX ADMIN — PHENYLEPHRINE HYDROCHLORIDE 2 SPRAY: 0.5 SPRAY NASAL at 20:17

## 2017-06-24 RX ADMIN — LACTOBACILLUS ACIDOPHILUS / LACTOBACILLUS BULGARICUS 1 PACKET: 100 MILLION CFU STRENGTH GRANULES at 18:09

## 2017-06-24 RX ADMIN — ASPIRIN 81 MG: 81 TABLET ORAL at 09:16

## 2017-06-24 RX ADMIN — CEFTRIAXONE SODIUM 2 G: 2 INJECTION, POWDER, FOR SOLUTION INTRAMUSCULAR; INTRAVENOUS at 20:17

## 2017-06-24 RX ADMIN — MAGNESIUM HYDROXIDE 30 ML: 400 SUSPENSION ORAL at 07:46

## 2017-06-24 RX ADMIN — LORAZEPAM 2 MG: 1 TABLET ORAL at 20:14

## 2017-06-24 RX ADMIN — PREDNISONE 20 MG: 20 TABLET ORAL at 09:16

## 2017-06-24 RX ADMIN — MORPHINE SULFATE 4 MG: 4 INJECTION INTRAVENOUS at 21:29

## 2017-06-24 RX ADMIN — CEFTRIAXONE SODIUM 2 G: 2 INJECTION, POWDER, FOR SOLUTION INTRAMUSCULAR; INTRAVENOUS at 09:05

## 2017-06-24 RX ADMIN — OXYCODONE HYDROCHLORIDE 10 MG: 10 TABLET ORAL at 21:29

## 2017-06-24 RX ADMIN — MYCOPHENOLATE MOFETIL 500 MG: 250 CAPSULE ORAL at 20:15

## 2017-06-24 RX ADMIN — MORPHINE SULFATE 4 MG: 4 INJECTION INTRAVENOUS at 14:30

## 2017-06-24 RX ADMIN — OXYCODONE HYDROCHLORIDE 10 MG: 10 TABLET ORAL at 09:41

## 2017-06-24 RX ADMIN — TIOTROPIUM BROMIDE 1 CAPSULE: 18 CAPSULE ORAL; RESPIRATORY (INHALATION) at 09:00

## 2017-06-24 RX ADMIN — OXYCODONE HYDROCHLORIDE 10 MG: 10 TABLET ORAL at 00:21

## 2017-06-24 RX ADMIN — TADALAFIL 40 MG: 20 TABLET ORAL at 20:16

## 2017-06-24 RX ADMIN — OXYCODONE HYDROCHLORIDE 10 MG: 10 TABLET ORAL at 03:32

## 2017-06-24 RX ADMIN — AMBRISENTAN 10 MG: 10 TABLET, FILM COATED ORAL at 09:00

## 2017-06-24 RX ADMIN — MORPHINE SULFATE 4 MG: 4 INJECTION INTRAVENOUS at 18:18

## 2017-06-24 RX ADMIN — TACROLIMUS 1.5 MG: 1 CAPSULE ORAL at 09:16

## 2017-06-24 RX ADMIN — BISACODYL 10 MG: 10 SUPPOSITORY RECTAL at 07:46

## 2017-06-24 RX ADMIN — MORPHINE SULFATE 4 MG: 4 INJECTION INTRAVENOUS at 00:21

## 2017-06-24 RX ADMIN — MORPHINE SULFATE 4 MG: 4 INJECTION INTRAVENOUS at 03:32

## 2017-06-24 RX ADMIN — GABAPENTIN 600 MG: 300 CAPSULE ORAL at 20:15

## 2017-06-24 RX ADMIN — POLYETHYLENE GLYCOL 3350 1 PACKET: 17 POWDER, FOR SOLUTION ORAL at 07:46

## 2017-06-24 RX ADMIN — TRAZODONE HYDROCHLORIDE 100 MG: 50 TABLET ORAL at 00:21

## 2017-06-24 RX ADMIN — MORPHINE SULFATE 4 MG: 4 INJECTION INTRAVENOUS at 06:28

## 2017-06-24 RX ADMIN — TACROLIMUS 1.5 MG: 1 CAPSULE ORAL at 20:15

## 2017-06-24 RX ADMIN — LORAZEPAM 2 MG: 1 TABLET ORAL at 07:53

## 2017-06-24 RX ADMIN — ONDANSETRON 4 MG: 2 INJECTION INTRAMUSCULAR; INTRAVENOUS at 21:35

## 2017-06-24 RX ADMIN — AZITHROMYCIN FOR INJECTION INJECTION, POWDER, LYOPHILIZED, FOR SOLUTION 500 MG: 500 INJECTION INTRAVENOUS at 09:06

## 2017-06-24 RX ADMIN — ONDANSETRON 4 MG: 2 INJECTION INTRAMUSCULAR; INTRAVENOUS at 09:58

## 2017-06-24 RX ADMIN — GABAPENTIN 600 MG: 300 CAPSULE ORAL at 15:24

## 2017-06-24 RX ADMIN — MORPHINE SULFATE 4 MG: 4 INJECTION INTRAVENOUS at 09:41

## 2017-06-24 RX ADMIN — GABAPENTIN 600 MG: 300 CAPSULE ORAL at 09:17

## 2017-06-24 ASSESSMENT — COGNITIVE AND FUNCTIONAL STATUS - GENERAL
DRESSING REGULAR LOWER BODY CLOTHING: A LITTLE
HELP NEEDED FOR BATHING: A LITTLE
MOBILITY SCORE: 22
CLIMB 3 TO 5 STEPS WITH RAILING: A LITTLE
WALKING IN HOSPITAL ROOM: A LITTLE
WALKING IN HOSPITAL ROOM: A LITTLE
SUGGESTED CMS G CODE MODIFIER MOBILITY: CJ
SUGGESTED CMS G CODE MODIFIER MOBILITY: CJ
DAILY ACTIVITIY SCORE: 21
MOBILITY SCORE: 22
CLIMB 3 TO 5 STEPS WITH RAILING: A LITTLE
SUGGESTED CMS G CODE MODIFIER DAILY ACTIVITY: CJ
SUGGESTED CMS G CODE MODIFIER MOBILITY: CJ
HELP NEEDED FOR BATHING: A LITTLE
DRESSING REGULAR LOWER BODY CLOTHING: A LITTLE
DRESSING REGULAR LOWER BODY CLOTHING: A LITTLE
DAILY ACTIVITIY SCORE: 21
DRESSING REGULAR LOWER BODY CLOTHING: A LITTLE
SUGGESTED CMS G CODE MODIFIER DAILY ACTIVITY: CJ
MOBILITY SCORE: 22
TOILETING: A LITTLE
TOILETING: A LITTLE
WALKING IN HOSPITAL ROOM: A LITTLE
DAILY ACTIVITIY SCORE: 21
SUGGESTED CMS G CODE MODIFIER MOBILITY: CJ
TOILETING: A LITTLE
DAILY ACTIVITIY SCORE: 21
HELP NEEDED FOR BATHING: A LITTLE
MOBILITY SCORE: 22
CLIMB 3 TO 5 STEPS WITH RAILING: A LITTLE
HELP NEEDED FOR BATHING: A LITTLE
CLIMB 3 TO 5 STEPS WITH RAILING: A LITTLE
SUGGESTED CMS G CODE MODIFIER DAILY ACTIVITY: CJ
SUGGESTED CMS G CODE MODIFIER DAILY ACTIVITY: CJ
TOILETING: A LITTLE
WALKING IN HOSPITAL ROOM: A LITTLE

## 2017-06-24 ASSESSMENT — PAIN SCALES - GENERAL
PAINLEVEL_OUTOF10: 6
PAINLEVEL_OUTOF10: 7
PAINLEVEL_OUTOF10: 8
PAINLEVEL_OUTOF10: 9
PAINLEVEL_OUTOF10: 9
PAINLEVEL_OUTOF10: 8
PAINLEVEL_OUTOF10: 4
PAINLEVEL_OUTOF10: 8
PAINLEVEL_OUTOF10: 8

## 2017-06-24 ASSESSMENT — ENCOUNTER SYMPTOMS
NEUROLOGICAL NEGATIVE: 1
FEVER: 0
NAUSEA: 0
COUGH: 0
BLURRED VISION: 0
DOUBLE VISION: 0
VOMITING: 0
HEADACHES: 0
PSYCHIATRIC NEGATIVE: 1
CONSTITUTIONAL NEGATIVE: 1
ABDOMINAL PAIN: 1
DIARRHEA: 1
MUSCULOSKELETAL NEGATIVE: 1
SHORTNESS OF BREATH: 0
CARDIOVASCULAR NEGATIVE: 1
CHILLS: 0

## 2017-06-24 ASSESSMENT — COPD QUESTIONNAIRES
DO YOU EVER COUGH UP ANY MUCUS OR PHLEGM?: NO/ONLY WITH OCCASIONAL COLDS OR INFECTIONS
HAVE YOU SMOKED AT LEAST 100 CIGARETTES IN YOUR ENTIRE LIFE: NO/DON'T KNOW
COPD SCREENING SCORE: 1
DURING THE PAST 4 WEEKS HOW MUCH DID YOU FEEL SHORT OF BREATH: NONE/LITTLE OF THE TIME

## 2017-06-24 ASSESSMENT — LIFESTYLE VARIABLES: DO YOU DRINK ALCOHOL: NO

## 2017-06-24 NOTE — CONSULTS
DATE OF SERVICE:  06/23/2017    REQUESTING PHYSICIAN:  Josemanuel Real MD.    DATE OF CONSULTATION:  06/23/2017    REASON FOR CONSULTATION:  Patient with fever and possible pneumonia.    HISTORY OF PRESENT ILLNESS:  The patient is a 57-year-old female who was   admitted to [facility] on 06/22/2017.  History is obtained   from chart review as well as discussing the case with the patient and Dr. Real.  This patient is well known to us from multiple previous   hospitalizations over the years.    This patient has significant underlying medical conditions.  In 2011, she   underwent a liver transplant associated with Lennox cirrhosis, portal   hypertension, esophageal varices.  She has been thought to have pulmonary   sarcoid but that diagnosis is in question.  She was told that she did have   sarcoid in her liver as a result of a biopsy, but no sarcoid in her lung   following a biopsy with questionable results.  That biopsy apparently is under   review at [facility].  That is undergoing repeat review just to see whether or not   they agree that it is not sarcoid.    The patient has been hospitalized multiple times over the years for   pneumonias.  In fact, last year, she was hospitalized and thought to have   perhaps an Aspergillus infection in her lungs.  She underwent 3 months of   voriconazole therapy.  She completed that therapy and she has not had problems   diagnosed with a fungal infection or Aspergillus since that time.    The patient presented this time with fever and a headache.  She had recently   been seen by Dr. Canada, her hepatologist down at [facility] in [place] on Monday this week.  Her liver was doing well at that time.  She   stated that at time she was feeling something funny in her lungs.  As the week   progressed, she began to develop some more funny heavy feeling in her lungs.    She states that she noticed she had a temperature of 103 associated a   headache.  Because  of that, the patient came to the emergency room for   evaluation.    The patient was evaluated in the emergency room and thought perhaps to have   pneumonia.  She was placed on azithromycin and ceftriaxone for that.  She was   complaining of headache.  The patient does have problems with headaches   frequently.  She underwent a lumbar puncture last night.  She was placed on   ampicillin as well last night for possible meningitis.  She remains on   azithromycin, ceftriaxone, and ampicillin at this time.  Incidentally, the   patient was given vancomycin also, last night.  It was thought in the past,   the patient might have red man syndrome in conjunction with vancomycin   dosages, but she does not have it with vancomycin yesterday.  Infectious   consult was requested for today.    The patient states that she does have grandchildren.  She has recently been   around a lot of people in her travel to [place] to see Dr. Canada as   well as to a baby shower and around small children.  Her son is at home which   she says has snotty nose and green drainage from his sinuses.  The patient has   just become sick as mentioned above this week.  She denies any sputum   production herself at this time.  She does have some chest pain with deep   inspiration, she states.  She denies any nausea or vomiting at this time.  The   patient does have some slight tenderness in her left upper quadrant.  She has   had problems with splenomegaly and lesion in her spleen for years now.  When   she gets sick, she states that her spleen bothers her even more.  She also   states she has problems with constipation, but no diarrhea recently.  She   denies any frequency, urgency or dysuria.  She denies any sore joints,   muscles, rashes or lesions.    Incidentally, the patient has lost a fair bit of weight since I saw her middle   of last year.  She did have a gastric sleeve surgery performed approximately   2 years ago.  She has taken off  approximately 100 pounds and she states she   feels better for that.  She does, however, remains on prednisone as well as   tacrolimus and mycophenolate for liver transplant.  Patient's prednisone dose   is 7 mg daily at this time.  Incidentally, the patient also has pulmonary   artery hypertension and she is on tadalafil for that.    ALLERGIES:  As reported the patient was allergic to vancomycin with red man   syndrome, but she did tolerate it just this hospitalization.  RHUBARB CAUSES   ANAPHYLAXIS FOR HER SHE STATES.    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  She has essential hypertension, hypothyroidism, chronic   kidney disease with decreased creatinine clearance.  She has a monoclonal   gammopathy.  She has had pseudomembranous colitis in the past in 2009, 2013   and 2016.  She has had chronic problems with respiratory infections.    SURGERIES:  The patient is status post reduction mammoplasty in 1989,   hysterectomy in 1999, closure of patent foramen ovale January 2011, carpal   tunnel release in 1997, laparoscopic cholecystectomy in 2009, ventral   herniorrhaphy x3, last being done in 2010, gastroesophageal varices banding in   2009 and 2010, liver transplant as mentioned above 12/26/2011.  She has been   abstinent from alcohol since that time.  She also had a gastric sleeve   performed approximately 2 years ago.    MEDICATIONS:  On admission to the hospital this time, the patient is on   tadalafil 40 mg at bedtime, alprazolam 0.5 mg 3 times a day, ambrisentan 10 mg   daily, ascorbic acid 500 mg daily, aspirin 81 daily, mycophenolate 500 mg 2   times daily, calcium and vitamin D, Flexeril 10 mg 3 times a day, Colace 100   mg daily, ferrous sulfate 220 mg daily, Flonase which is fluticasone 1 spray   in the nostril daily, furosemide 40 mg daily, gabapentin 600 mg 1 tab 3 times   a day, levothyroxine 137 mcg daily, linaclotide 290 mg daily, omeprazole  40 mg   daily, Ondansetron 4 mg p.r.n. nausea, oxycodone immediate release 10 mg 1-2   tabs by mouth every 6 hours p.r.n. pain, Pravachol 20 mg daily, prednisone 7   mg daily, probiotic daily, Senokot 2 times a day, sertraline 100 mg at   bedtime, tacrolimus 1.5 mg by mouth 2 times a day, Spiriva caps 1 by mouth   every day and trazodone 50 mg 1-2 tablets at bedtime as needed for sleep.    FAMILY HISTORY:  Noncontributory change wise, there is no significant history   of sarcoid in the family.  There is no history of pulmonary problems in the   family.    SOCIAL HISTORY:  The patient is .  She does not smoke.  She has been   dry from alcohol since  2009.  She does not use IV recreational drugs.    REVIEW OF SYSTEMS:  No significant change As compared to review of systems   performed by Dr. Quintero, my partner, in September 2016.    PHYSICAL EXAMINATION:  VITAL SIGNS:  T-max since admission has been 36.9, most recent temperature is   36.9, respirations are 23, pulse is 57, blood pressure is 112/60.  The patient   is 1.72 cm tall.  She weighs 176 pounds, 80 kilograms.  GENERAL:  The patient is normally developed, normally nourished appearing   white female who does appear to have lost significant weight since I saw her   last.  She states she has lost 100 pounds over the past year.  She did this   through a combination of lifestyle changes and a gastric sleeve she had   approximately 2 years ago.  HEENT:  Head appears normocephalic.  Eyes, pupils are equal and round.  Nose   and mouth:  No acute sores or lesions noted.  NECK:  Supple.  LUNGS:  Breath sounds are essentially clear bilaterally without E to A   changes.  HEART:  Slight bradycardia with a grade II/VI early systolic murmur along the   sternal border.  BACK:  No spine or CVA tenderness.  ABDOMEN:  Soft.  The patient is slightly tender in the left upper quadrant   under her rib cage in the area of her spleen.  There is no guarding, rigidity   or  rebound.  She has no tenderness in right upper quadrant.  GENITOURINARY:  No acute sores or lesions reported.  EXTREMITIES:  No acute sores or lesions are seen.  CENTRAL NERVOUS SYSTEM:  Mental status:  The patient is alert and oriented x3.    Motor and sensory are grossly intact.    SUMMARY OF LABORATORY DATA:  From admission, the patient's white blood count   is 6.6.  Today it is 5.0, 90.9% segs, 6.7% lymphs, 1.8% monos, no eos,   basophils 0.2%, hemoglobin and hematocrit 11.8 and 35.5 with a platelet count   of 70,000.  From admission chem panel:  Sodium 136, potassium 4.1, chloride   102, bicarbonate 26, glucose 185, BUN 23, creatinine 1.14, calcium 9.2, SGOT   16, SGPT 14, alkaline phosphatase is 24, total bilirubin 0.4, total protein   6.3 and albumin 3.9.  The patient's globulin is 2.4.  Phosphorus is 2.7.    Lactic acid last night was 0.7 after the initial one of 1.6.  Her BNP today is   essentially same as yesterday at 4141.  Sodium 141, potassium 4.0, chloride   110, bicarbonate 26, glucose 242, BUN 18, creatinine 0.94 and calcium 8.1.    Lumbar puncture done over the last night, fluid was clear and colorless.    There were 2 white blood cells, 1 red blood cell, 16% polys, 16% lymphs, 17%   mononuclear, the glucose was 78 and the protein was 51.  Lactic acid was done   on the spinal fluid which is 1.4, I am not sure how to interpret that.  Spinal   fluid there is no growth as of this morning.  Blood cultures no growth as of   this morning and urine cultures is in process has no growth.    IMAGING STUDIES:  Chest x-ray done on admission, 2 were done, the most recent   one showed the patient to have some right basilar opacification consistent   with possibly pneumonia.  There is a left lower opacification, which is   unchanged.  The patient's CT of the chest and abdomen and pelvis showed   reticular ground glass densities in the right lower lobe.  There is left lower   lobe opacity with air bronchograms as  well, persistent splenomegaly with   hypodense lesion, which has not changed as compared to the previous CAT scan   done on 03/26/2017.  Head CT showed no acute intracranial abnormalities.    There is some ethmoid and sphenoid sinus tissue thickening.    IMPRESSION:  1.  Pneumonia in immunocompromised patient.  Etiology unclear, possibilities   include:     a) Bacterial.     b)  Viral.     c)  Fungal     d)  Other.  2.  Status post liver transplant.  3.  Chronic splenomegaly.  4.  Possible sarcoidosis diagnosis.  5.  Pulmonary hypertension.  6.  Obstructive sleep apnea.  7.  Diabetes mellitus, thought secondary to recurrent episodes of   pancreatitis.  8.  Hypothyroidism.  9.  Medical history significant for excessive weight.  10.  Chronic kidney disease.  11.  History of monoclonal gammopathy  12.  Prior history of pseudomembranous colitis.    DISCUSSION:  The patient presents with pulmonary symptoms primarily at this   time.  She has been placed on ceftriaxone plus azithromycin.  I will leave her  on both of the antibiotics for pulmonary coverage.  It is quite possible that  the patient has a viral infection  as she has been around young children   recently.  She normally protects herself by not been around lot of people   secondary to her immune suppression.  She also has a son who however was   sick with what sounds like a viral illness.    I think it is safe to go and stop the patient's ampicillin as I do not think   she has meningitis.  The lumbar puncture was unremarkable.  We will continue   to watch her closely; however, for any changes in her CNS symptoms.    I have discussed the case at length with Dr. Real.  He will continue to   manage patient's pain medications and her immunosuppressive therapy.  He is   familiar with her and was taking care of her in the past.  We will continue to  observe her in the intensive care unit overnight.  If she continues to do   well, she might be able to transferred to  the floor from tomorrow.    PLAN:  1.  Continue ceftriaxone 2 g IV every 24 hours.  2.  Continue azithromycin 500 mg IV every 24 hours.  3.  Discontinue ampicillin.  4.  Continue vancomycin to 48-hour range if no MRSA isolated using vancomycin.  5.  Follow up pending cultures.  6.  Close observation of patient's clinical progress.  7.  Continue to monitor patient's immunosuppressive therapy.  8.  Enlarged database as the patient's condition changes.    Thank you for allowing me to see this interesting and complicated patient in   consultation once again with you and assist in her care.  I spent well over 70   minutes reviewing this patient's recent records and also reviewed her history   here in the hospital, discussing the case with the patient, discussing with   Dr. Real, reviewing labs, reviewing case with nursing and adjusting the   patient's medications as well as preparing this report.       ____________________________________     MD KEIKO CARDOZO / NTS    DD:  06/23/2017 18:58:45  DT:  06/23/2017 22:05:57    D#:  [id number]  Job#:  [id number]    cc: ALICE PONCE MD, NATHEN WHEATLEY MD, GOPAL HOLLIDAY MD, Jonathan Trinh MD, LISA ORDONEZ MD, TUCKER CORRIGAN MD, JYOTI REAL MD, YAYA BILLINGS MD

## 2017-06-24 NOTE — PROGRESS NOTES
"Infectious Disease Progress Note    Author: KURTIS Jacobsen Date & Time created: 2017  11:33 AM    Chief Complaint:  Chief Complaint   Patient presents with   • LUQ Pain     biba from home,  radiates into (L) clavicle.  increased pain with deep respirations.    • Head Ache     started yesterday on (L) side of head.        Interval History:  Ceftriaxone D#2                                          -Blood Cx x 2-NGTD  Azithromycin D#2                                        -Urine Cx-NGTD  Vanco D#2-per pharmacy dosing              -CSF Cx-NGTD    Prev. Ampicillin     Patient resting in bed, denies N/V, some Diarrhea with restarting her home Linzess. Denies fevers/chills. No issues per RN but patient anxious with any thought of transfer out of ICU.    Labs Reviewed, Medications Reviewed, Radiology Reviewed, Wound Reviewed, Fluids Reviewed and GI Nutrition Reviewed.    Review of Systems:  Review of Systems   Constitutional: Negative.  Negative for fever and chills.   HENT: Negative.    Respiratory:        \"Pain in lungs\"   Cardiovascular: Negative.  Negative for chest pain.   Gastrointestinal: Positive for diarrhea. Negative for nausea and vomiting.   Musculoskeletal: Negative.    Skin: Negative.  Negative for itching and rash.   Neurological: Negative.    Psychiatric/Behavioral: Negative.        Hemodynamics:  Temp (24hrs), Av °C (98.6 °F), Min:36.8 °C (98.2 °F), Max:37.1 °C (98.8 °F)  Temperature: 36.8 °C (98.2 °F)  Pulse  Av.7  Min: 53  Max: 105Heart Rate (Monitored): 68  NIBP: 139/62 mmHg       Physical Exam:  Physical Exam   Constitutional: She is oriented to person, place, and time. She appears well-developed and well-nourished. No distress.   HENT:   Head: Normocephalic and atraumatic.   Cardiovascular: Normal rate, regular rhythm, normal heart sounds and intact distal pulses.  Exam reveals no gallop and no friction rub.    No murmur heard.  Pulmonary/Chest: Effort normal. No " respiratory distress.   Diminished BLL, with faint crackles to left lower lobe.  O2 6LPM   Abdominal: Soft. Bowel sounds are normal. She exhibits no distension. There is tenderness (Just lateral(left) of mid-epigastric).   Musculoskeletal: Normal range of motion. She exhibits no edema or tenderness.   Neurological: She is alert and oriented to person, place, and time.   Skin: Skin is warm and dry. No rash noted. She is not diaphoretic. No erythema. No pallor.   Psychiatric: She has a normal mood and affect. Her behavior is normal. Judgment and thought content normal.   Nursing note and vitals reviewed.      Meds:    Current facility-administered medications:   •  mycophenolate (CELLCEPT) capsule 500 mg, 500 mg, Oral, BID, Josemanuel Real M.D., 500 mg at 06/24/17 0916  •  tacrolimus (PROGRAF) capsule 1.5 mg, 1.5 mg, Oral, BID, Josemanuel Real M.D., 1.5 mg at 06/24/17 0916  •  furosemide (LASIX) tablet 40 mg, 40 mg, Oral, QAM, Josemanuel Real M.D., 40 mg at 06/24/17 0917  •  Linaclotide CAPS 290 mg, 290 mg, Oral, DAILY, Josemanuel Real M.D.  •  Tadalafil (PAH) TABS 40 mg, 40 mg, Oral, QHS, Josemanuel Real M.D., 40 mg at 06/23/17 2100  •  predniSONE (DELTASONE) tablet 20 mg, 20 mg, Oral, DAILY, Josemanuel Real M.D., 20 mg at 06/24/17 0916  •  phenylephrine (NEOSYNEPHRINE) 0.5 % nasal spray 2 Spray, 2 Spray, Nasal, TID PRN, Josemanuel Real M.D., 2 [place] at 06/23/17 1918  •  lorazepam (ATIVAN) tablet 1-2 mg, 1-2 mg, Oral, Q4HRS PRN, Josemanuel Real M.D., 2 mg at 06/24/17 0753  •  senna-docusate (PERICOLACE or SENOKOT S) 8.6-50 MG per tablet 2 Tab, 2 Tab, Oral, BID PRN, 2 Tab at 06/24/17 0916 **AND** polyethylene glycol/lytes (MIRALAX) PACKET 1 Packet, 1 Packet, Oral, QDAY PRN, 1 Packet at 06/24/17 0746 **AND** magnesium hydroxide (MILK OF MAGNESIA) suspension 30 mL, 30 mL, Oral, QDAY PRN, 30 mL at 06/24/17 0746 **AND** bisacodyl (DULCOLAX) suppository 10 mg, 10 mg, Rectal, QDAY PRN, Brad Ruiz M.D., 10 mg at 06/24/17 0746  •   Respiratory Care per Protocol, , Nebulization, Continuous RT, Brad Ruiz M.D.  •  ondansetron (ZOFRAN) syringe/vial injection 4 mg, 4 mg, Intravenous, Q4HRS PRN, Brad Ruiz M.D., 4 mg at 06/24/17 0958  •  ondansetron (ZOFRAN ODT) dispertab 4 mg, 4 mg, Oral, Q4HRS PRN, Brad Ruiz M.D.  •  tiotropium (SPIRIVA) 18 MCG inhalation capsule 1 Cap, 1 Cap, Inhalation, DAILY, Brad Ruiz M.D., 1 Cap at 06/23/17 0904  •  trazodone (DESYREL) tablet  mg,  mg, Oral, QHS PRN, Brad Ruiz M.D., 100 mg at 06/24/17 0021  •  sertraline (ZOLOFT) tablet 100 mg, 100 mg, Oral, QHS, Brad Ruiz M.D., 100 mg at 06/23/17 2019  •  pravastatin (PRAVACHOL) tablet 20 mg, 20 mg, Oral, DAILY, Brad Ruiz M.D., 20 mg at 06/24/17 0916  •  omeprazole (PRILOSEC) capsule 40 mg, 40 mg, Oral, DAILY, Brad Ruiz M.D., 40 mg at 06/24/17 0916  •  levothyroxine (SYNTHROID) tablet 137 mcg, 137 mcg, Oral, QDAY, Brad Ruiz M.D., 137 mcg at 06/24/17 0628  •  gabapentin (NEURONTIN) capsule 600 mg, 600 mg, Oral, TID, Brad Ruiz M.D., 600 mg at 06/24/17 0917  •  cyclobenzaprine (FLEXERIL) tablet 10 mg, 10 mg, Oral, TID PRN, Brad Ruiz M.D.  •  aspirin EC (ECOTRIN) tablet 81 mg, 81 mg, Oral, QAM, Brad Ruiz M.D., 81 mg at 06/24/17 0916  •  ambrisentan (LETAIRIS) TABS 10 mg, 10 mg, Oral, DAILY, Brad Ruiz M.D., 10 mg at 06/24/17 0900  •  alprazolam (XANAX) tablet 0.5 mg, 0.5 mg, Oral, TID PRN, Brad Ruiz M.D., 0.5 mg at 06/23/17 1516  •  Notify provider if pain remains uncontrolled, , , CONTINUOUS **AND** Use the numeric rating scale (NRS-11) on regular floors and Critical-Care Pain Observation Tool (CPOT) on ICUs/Trauma to assess pain, , , CONTINUOUS **AND** Pulse Ox (Oximetry), , , CONTINUOUS **AND** [DISCONTINUED] Pharmacy Consult Request ...Pain Management Review, , Other, PRN **AND** If patient difficult to arouse and/or has respiratory depression, stop any opiates that are currently infusing and call a Rapid Response., , ,  CONTINUOUS **AND** oxycodone immediate-release (ROXICODONE) tablet 5 mg, 5 mg, Oral, Q3HRS PRN, 5 mg at 06/23/17 0822 **AND** oxycodone immediate-release (ROXICODONE) tablet 10 mg, 10 mg, Oral, Q3HRS PRN, 10 mg at 06/24/17 0941 **AND** morphine (pf) 4 mg/ml injection 4 mg, 4 mg, Intravenous, Q3HRS PRN, Brad Ruiz M.D., 4 mg at 06/24/17 0941  •  lactobacillus granules (LACTINEX/FLORANEX) packet 1 Packet, 1 Packet, Oral, TID WITH MEALS, Bhakti Apple, PHARMD, 1 Packet at 06/24/17 0730  •  norepinephrine (LEVOPHED) 8 mg in  mL Infusion, 0.5-30 mcg/min, Intravenous, Continuous, Brad Ruiz M.D., Stopped at 06/23/17 0715  •  NS (BOLUS) infusion 1,000 mL, 1,000 mL, Intravenous, Once PRN, Brad Ruiz M.D.  •  cefTRIAXone (ROCEPHIN) 2 g in  mL IVPB, 2 g, Intravenous, Q12HRS, Brad Ruiz M.D., Last Rate: 200 mL/hr at 06/24/17 0905, 2 g at 06/24/17 0905  •  MD ALERT... vancomycin per pharmacy protocol, , Other, pharmacy to dose, Brad Ruiz M.D.  •  azithromycin (ZITHROMAX) 500 mg in D5W 250 mL IVPB, 500 mg, Intravenous, Q24HRS, Brad Ruiz M.D., Last Rate: 250 mL/hr at 06/24/17 0906, 500 mg at 06/24/17 0906    Labs:  Recent Labs      06/22/17   1300  06/23/17   0530  06/24/17   0636   WBC  6.6  5.0  5.1   RBC  3.96*  3.96*  3.73*   HEMOGLOBIN  11.5*  11.8*  11.0*   HEMATOCRIT  34.6*  35.5*  33.2*   MCV  87.4  89.6  89.0   MCH  29.0  29.8  29.5   RDW  43.0  45.2  44.4   PLATELETCT  56*  70*  68*   MPV  9.3  9.1  9.7   NEUTSPOLYS  87.60*  90.90*  83.20*   LYMPHOCYTES  6.50*  6.70*  11.30*   MONOCYTES  4.80  1.80  4.90   EOSINOPHILS  0.60  0.00  0.00   BASOPHILS  0.20  0.20  0.20     Recent Labs      06/22/17   1300  06/23/17   0530  06/24/17   0636   SODIUM  136  141  142   POTASSIUM  4.1  4.4  4.3   CHLORIDE  102  110  111   CO2  26  26  25   GLUCOSE  185*  242*  255*   BUN  23*  18  18     Recent Labs      06/22/17   1300  06/23/17   0530  06/24/17   0636   ALBUMIN  3.9   --    --    TBILIRUBIN  0.4   --    --     ALKPHOSPHAT  24*   --    --    TOTPROTEIN  6.3   --    --    ALTSGPT  14   --    --    ASTSGOT  16   --    --    CREATININE  1.14  0.94  0.93       Imaging:  Imaging studies reviewed.     Micro:  BLOOD CULTURE   Date Value Ref Range Status   06/22/2017   Preliminary    No Growth    Note: Blood cultures are incubated for 5 days and  are monitored continuously.Positive blood cultures  are called to the RN and reported as soon as  they are identified.       BLOOD CULTURE HOLD   Date Value Ref Range Status   03/05/2014 Collected  Final        Assessment:  Active Hospital Problems    Diagnosis   • Septic shock (CMS-HCC) [A41.9, R65.21]   • Sarcoidosis (CMS-HCC) [D86.9]   • Status post liver transplant Dr. Canada ([facility]) [Z94.4]   • Pulmonary hypertension (CMS-HCC) [I27.2]   • COPD (chronic obstructive pulmonary disease) (CMS-HCC) [J44.9]   • Sepsis (CMS-HCC) [A41.9]   • IDDM (insulin dependent diabetes mellitus) (CMS-HCC) [E11.9, Z79.4]   • Thrombocytopenia (CMS-HCC) [D69.6]   • Immunocompromised state (CMS-HCC) [D84.9]         Diarrhea 2/2 Linzess    Plan:  1. Continue IV Ceftriaxone/Azithromycin. D/c Vanco 2/2 high trough and no resistant organisms isolated  2. Monitor respiratory status.   3. Encourage OOB to chair.   4. 35 mins. >50% of time spent in direct care/coordination of care  5. D/w Patient, RN and in collaboration with Dr. Villegas        Discussed with internal Medicine

## 2017-06-24 NOTE — CARE PLAN
Problem: Safety  Goal: Will remain free from injury  Outcome: PROGRESSING AS EXPECTED  Bed in lowest and locked position, bed alarm in use, call light within reach. Pt's room close to nurses station. Hourly rounding in place.     Problem: Psychosocial Needs:  Goal: Level of anxiety will decrease  Outcome: PROGRESSING SLOWER THAN EXPECTED  Pt has increased anxiety and irritability. Emotional support provided, therapeutic communication in use. Relaxation techniques offered. Medicated per MAR as needed.

## 2017-06-24 NOTE — PROGRESS NOTES
"[facility] Hospitalist Progress Note    Date of Service: 2017    Chief Complaint  57 y.o. female admitted 2017 with fever    Interval Problem Update  Ms. Cuba has a hx of liver transplant on chronic immunosuppressant therapy.  She was admitted to the ICU due to septic shock requiring IV pressors. I discussed with Dr. Villegas today about her condition and choice of abx. Today Ms. Cuba is feeling about the same.  Consultants/Specialty  ID     Disposition  ICU        Review of Systems   Constitutional: Negative for fever and chills.   Eyes: Negative for blurred vision and double vision.   Respiratory: Negative for cough and shortness of breath.    Cardiovascular: Negative for chest pain and leg swelling.   Gastrointestinal: Positive for abdominal pain. Negative for nausea.        LUQ pain \"sharp\" in the region of the spleen.    Genitourinary: Negative for dysuria.   Neurological: Negative for headaches.      Physical Exam  Laboratory/Imaging   Hemodynamics  Temp (24hrs), Av.9 °C (98.5 °F), Min:36.8 °C (98.2 °F), Max:37.1 °C (98.8 °F)   Temperature: 36.8 °C (98.2 °F)  Pulse  Av.9  Min: 53  Max: 98 Heart Rate (Monitored): 64  NIBP: 116/58 mmHg      Respiratory      Respiration: (!) 11, Pulse Oximetry: 99 %        RUL Breath Sounds: Clear, RML Breath Sounds: Clear, RLL Breath Sounds: Diminished, MANJINDER Breath Sounds: Clear, LLL Breath Sounds: Diminished    Fluids    Intake/Output Summary (Last 24 hours) at 17 0954  Last data filed at 17 0600   Gross per 24 hour   Intake   2130 ml   Output   4050 ml   Net  -1920 ml       Nutrition  Orders Placed This Encounter   Procedures   • Diet Order     Standing Status: Standing      Number of Occurrences: 1      Standing Expiration Date:      Order Specific Question:  Diet:     Answer:  Diabetic [3]     Physical Exam   Constitutional: She is oriented to person, place, and time. No distress.   Neck: Neck supple.   Right IJ central line without " "bleeding.    Cardiovascular: Normal rate and regular rhythm.    No murmur heard.  Pulmonary/Chest: Effort normal. No respiratory distress. She has rales.   Abdominal: Soft. She exhibits no distension. There is no rebound.   Mild LUQ tenderness.   Musculoskeletal: She exhibits no tenderness.   Swelling of the hands.   Neurological: She is alert and oriented to person, place, and time.   Skin: She is not diaphoretic. There is pallor.   Scabs bilat proximal shins. Right shin skin lesion \"from sarcoid\"    Psychiatric: She has a normal mood and affect. Her behavior is normal.       Recent Labs      06/22/17   1300  06/23/17   0530  06/24/17   0636   WBC  6.6  5.0  5.1   RBC  3.96*  3.96*  3.73*   HEMOGLOBIN  11.5*  11.8*  11.0*   HEMATOCRIT  34.6*  35.5*  33.2*   MCV  87.4  89.6  89.0   MCH  29.0  29.8  29.5   MCHC  33.2*  33.2*  33.1*   RDW  43.0  45.2  44.4   PLATELETCT  56*  70*  68*   MPV  9.3  9.1  9.7     Recent Labs      06/22/17   1300  06/23/17   0530  06/24/17   0636   SODIUM  136  141  142   POTASSIUM  4.1  4.4  4.3   CHLORIDE  102  110  111   CO2  26  26  25   GLUCOSE  185*  242*  255*   BUN  23*  18  18   CREATININE  1.14  0.94  0.93   CALCIUM  9.2  8.1*  8.1*     Recent Labs      06/22/17   1300  06/22/17   1925   APTT  27.9  30.3   INR  1.02  1.09                  Assessment/Plan     Septic shock (CMS-HCC) (present on admission)  Assessment & Plan  Source remains cryptic.  IV fluids given. IV levophed had been utilized and now off.   Broad spectrum IV abx.   Infectious dz consult by [facility].   LP negative.  CT chest/abd/pelvis:  1.  Reticular and groundglass densities in the right lower lobe likely represent an infectious or inflammatory process. Follow-up imaging is recommended to document resolution.    2.  Left lower lobe opacity with air bronchograms may represent atelectasis or pneumonia.    3.  Atherosclerosis.    4.  Persistent splenomegaly with hypodense lesions    Status post " liver transplant Dr. Canada ([facility]) (present on admission)  Assessment & Plan  On cellcept and prograf.    Sarcoidosis (CMS-HCC) (present on admission)  Assessment & Plan  Hx of. Followed by pulmonary.    Immunocompromised state (CMS-HCC) (present on admission)  Assessment & Plan  Secondary to immunosuppressants     Thrombocytopenia (CMS-HCC) (present on admission)  Assessment & Plan  Chronic     IDDM (insulin dependent diabetes mellitus) (CMS-HCC) (present on admission)  Assessment & Plan  On home insulin    COPD (chronic obstructive pulmonary disease) (CMS-HCC) (present on admission)  Assessment & Plan  She has been followed by pulmonology in the past.    Pulmonary hypertension (CMS-HCC) (present on admission)  Assessment & Plan  Followed by Dr. Phan.  On Tadalafil and Letairis.  Restart lasix 40 mg daily.      Labs reviewed and Medications reviewed  Holbrook catheter: No Holbrook  Central line in place: Sepsis    DVT Prophylaxis: Contraindicated - High bleeding risk (s/p lumbar puncture with platelets in the 60's)

## 2017-06-25 LAB
BACTERIA CSF CULT: NORMAL
GLUCOSE BLD-MCNC: 141 MG/DL (ref 65–99)
GRAM STN SPEC: NORMAL
SIGNIFICANT IND 70042: NORMAL
SITE SITE: NORMAL
SOURCE SOURCE: NORMAL

## 2017-06-25 PROCEDURE — A9270 NON-COVERED ITEM OR SERVICE: HCPCS | Performed by: INTERNAL MEDICINE

## 2017-06-25 PROCEDURE — 700102 HCHG RX REV CODE 250 W/ 637 OVERRIDE(OP): Performed by: PHARMACIST

## 2017-06-25 PROCEDURE — 700102 HCHG RX REV CODE 250 W/ 637 OVERRIDE(OP): Performed by: HOSPITALIST

## 2017-06-25 PROCEDURE — 99232 SBSQ HOSP IP/OBS MODERATE 35: CPT | Performed by: INTERNAL MEDICINE

## 2017-06-25 PROCEDURE — 700111 HCHG RX REV CODE 636 W/ 250 OVERRIDE (IP): Performed by: INTERNAL MEDICINE

## 2017-06-25 PROCEDURE — 700102 HCHG RX REV CODE 250 W/ 637 OVERRIDE(OP): Performed by: INTERNAL MEDICINE

## 2017-06-25 PROCEDURE — 700111 HCHG RX REV CODE 636 W/ 250 OVERRIDE (IP): Performed by: HOSPITALIST

## 2017-06-25 PROCEDURE — A9270 NON-COVERED ITEM OR SERVICE: HCPCS | Performed by: PHARMACIST

## 2017-06-25 PROCEDURE — 82962 GLUCOSE BLOOD TEST: CPT

## 2017-06-25 PROCEDURE — 770006 HCHG ROOM/CARE - MED/SURG/GYN SEMI*

## 2017-06-25 PROCEDURE — 700105 HCHG RX REV CODE 258: Performed by: INTERNAL MEDICINE

## 2017-06-25 PROCEDURE — A9270 NON-COVERED ITEM OR SERVICE: HCPCS | Performed by: HOSPITALIST

## 2017-06-25 RX ORDER — PREDNISONE 5 MG/1
15 TABLET ORAL DAILY
Status: DISCONTINUED | OUTPATIENT
Start: 2017-06-26 | End: 2017-06-26 | Stop reason: HOSPADM

## 2017-06-25 RX ADMIN — MORPHINE SULFATE 4 MG: 4 INJECTION INTRAVENOUS at 20:46

## 2017-06-25 RX ADMIN — CEFTRIAXONE SODIUM 2 G: 2 INJECTION, POWDER, FOR SOLUTION INTRAMUSCULAR; INTRAVENOUS at 20:45

## 2017-06-25 RX ADMIN — TRAZODONE HYDROCHLORIDE 100 MG: 50 TABLET ORAL at 00:42

## 2017-06-25 RX ADMIN — AZITHROMYCIN FOR INJECTION INJECTION, POWDER, LYOPHILIZED, FOR SOLUTION 500 MG: 500 INJECTION INTRAVENOUS at 10:55

## 2017-06-25 RX ADMIN — ALPRAZOLAM 0.5 MG: 0.5 TABLET ORAL at 00:43

## 2017-06-25 RX ADMIN — PHENYLEPHRINE HYDROCHLORIDE 2 SPRAY: 0.5 SPRAY NASAL at 09:46

## 2017-06-25 RX ADMIN — MORPHINE SULFATE 4 MG: 4 INJECTION INTRAVENOUS at 09:28

## 2017-06-25 RX ADMIN — ALPRAZOLAM 0.5 MG: 0.5 TABLET ORAL at 21:46

## 2017-06-25 RX ADMIN — OMEPRAZOLE 40 MG: 20 CAPSULE, DELAYED RELEASE ORAL at 09:33

## 2017-06-25 RX ADMIN — GABAPENTIN 600 MG: 300 CAPSULE ORAL at 20:46

## 2017-06-25 RX ADMIN — MYCOPHENOLATE MOFETIL 500 MG: 250 CAPSULE ORAL at 09:40

## 2017-06-25 RX ADMIN — GABAPENTIN 600 MG: 300 CAPSULE ORAL at 09:32

## 2017-06-25 RX ADMIN — LORAZEPAM 2 MG: 1 TABLET ORAL at 17:19

## 2017-06-25 RX ADMIN — TIOTROPIUM BROMIDE 1 CAPSULE: 18 CAPSULE ORAL; RESPIRATORY (INHALATION) at 09:42

## 2017-06-25 RX ADMIN — MYCOPHENOLATE MOFETIL 500 MG: 250 CAPSULE ORAL at 20:48

## 2017-06-25 RX ADMIN — LORAZEPAM 2 MG: 1 TABLET ORAL at 09:29

## 2017-06-25 RX ADMIN — AMBRISENTAN 10 MG: 10 TABLET, FILM COATED ORAL at 09:00

## 2017-06-25 RX ADMIN — PRAVASTATIN SODIUM 20 MG: 20 TABLET ORAL at 09:33

## 2017-06-25 RX ADMIN — ALPRAZOLAM 0.5 MG: 0.5 TABLET ORAL at 13:19

## 2017-06-25 RX ADMIN — TADALAFIL 40 MG: 20 TABLET ORAL at 21:00

## 2017-06-25 RX ADMIN — TACROLIMUS 1.5 MG: 1 CAPSULE ORAL at 20:47

## 2017-06-25 RX ADMIN — PREDNISONE 20 MG: 20 TABLET ORAL at 09:34

## 2017-06-25 RX ADMIN — TACROLIMUS 1.5 MG: 1 CAPSULE ORAL at 09:40

## 2017-06-25 RX ADMIN — FUROSEMIDE 40 MG: 40 TABLET ORAL at 09:33

## 2017-06-25 RX ADMIN — MORPHINE SULFATE 4 MG: 4 INJECTION INTRAVENOUS at 17:20

## 2017-06-25 RX ADMIN — PHENYLEPHRINE HYDROCHLORIDE 2 SPRAY: 0.5 SPRAY NASAL at 20:51

## 2017-06-25 RX ADMIN — CEFTRIAXONE SODIUM 2 G: 2 INJECTION, POWDER, FOR SOLUTION INTRAMUSCULAR; INTRAVENOUS at 09:34

## 2017-06-25 RX ADMIN — LEVOTHYROXINE SODIUM 137 MCG: 137 TABLET ORAL at 05:53

## 2017-06-25 RX ADMIN — GABAPENTIN 600 MG: 300 CAPSULE ORAL at 17:18

## 2017-06-25 RX ADMIN — ASPIRIN 81 MG: 81 TABLET ORAL at 09:34

## 2017-06-25 RX ADMIN — LACTOBACILLUS ACIDOPHILUS / LACTOBACILLUS BULGARICUS 1 PACKET: 100 MILLION CFU STRENGTH GRANULES at 09:45

## 2017-06-25 RX ADMIN — MORPHINE SULFATE 4 MG: 4 INJECTION INTRAVENOUS at 00:43

## 2017-06-25 RX ADMIN — SERTRALINE 100 MG: 100 TABLET, FILM COATED ORAL at 20:45

## 2017-06-25 RX ADMIN — LACTOBACILLUS ACIDOPHILUS / LACTOBACILLUS BULGARICUS 1 PACKET: 100 MILLION CFU STRENGTH GRANULES at 17:18

## 2017-06-25 RX ADMIN — MORPHINE SULFATE 4 MG: 4 INJECTION INTRAVENOUS at 13:18

## 2017-06-25 RX ADMIN — ALPRAZOLAM 0.5 MG: 0.5 TABLET ORAL at 05:57

## 2017-06-25 RX ADMIN — MORPHINE SULFATE 4 MG: 4 INJECTION INTRAVENOUS at 05:57

## 2017-06-25 ASSESSMENT — ENCOUNTER SYMPTOMS
BLURRED VISION: 0
CONSTITUTIONAL NEGATIVE: 1
RESPIRATORY NEGATIVE: 1
NAUSEA: 1
NEUROLOGICAL NEGATIVE: 1
SHORTNESS OF BREATH: 0
NAUSEA: 0
PALPITATIONS: 0
VOMITING: 0
CHILLS: 0
DIARRHEA: 1
DOUBLE VISION: 0
ABDOMINAL PAIN: 1
MUSCULOSKELETAL NEGATIVE: 1
CARDIOVASCULAR NEGATIVE: 1
HEADACHES: 0
COUGH: 0
EYES NEGATIVE: 1
FEVER: 0
PSYCHIATRIC NEGATIVE: 1

## 2017-06-25 ASSESSMENT — PAIN SCALES - GENERAL
PAINLEVEL_OUTOF10: 8
PAINLEVEL_OUTOF10: 9
PAINLEVEL_OUTOF10: 7
PAINLEVEL_OUTOF10: 4
PAINLEVEL_OUTOF10: 8
PAINLEVEL_OUTOF10: 5
PAINLEVEL_OUTOF10: 2
PAINLEVEL_OUTOF10: 8

## 2017-06-25 ASSESSMENT — PAIN SCALES - WONG BAKER: WONGBAKER_NUMERICALRESPONSE: HURTS JUST A LITTLE BIT

## 2017-06-25 NOTE — PROGRESS NOTES
2 RN skin assessment complete with PENNY Calle. Skin intact, not open wound, bruising on RUE and bilat LEs

## 2017-06-25 NOTE — PROGRESS NOTES
Pt transferred via hospital bed to [facility] with transport. On 6L NC. Central line left in place per MD. All of pt's belongings, meds, and chart with pt.

## 2017-06-25 NOTE — PROGRESS NOTES
Assessment completed at start of shift. Pt is A&Ox4, no s/s distress noted. Reports pain that is chronic and requests morphine and Ativan to treat pain and anxiety related to pain and stress. Pt is cooperative and pleasant, has very good knowledge of her disease process, medication regimen/indications/use. No s/s distress noted. She is steady with ambulation, calls appropriately for SBA with all needs. No bed alarm needed. Bed is low, call light in reach. Mostly stays in bed in semi-fowlers but ambulates to and from bathroom with no problems.

## 2017-06-25 NOTE — PROGRESS NOTES
Infectious Disease Progress Note    Author: KURTIS Jacobsen Date & Time created: 2017  11:32 AM    Chief Complaint:  Chief Complaint   Patient presents with   • LUQ Pain     biba from home,  radiates into (L) clavicle.  increased pain with deep respirations.    • Head Ache     started yesterday on (L) side of head.        Interval History:  Ceftriaxone D#3                                        -Blood Cx x 2-NGTD  Azithromycin D#3                                     -Urine Cx-NGTD                                                                    /-CSF Cx-NGTD    Prev. Ampicillin, Vanco x 2 days    Patient resting in bed, states breathing is about the same. Denies fevers/chills, some nausea today. Continued LUQ abdominal pain is unchanged. No issues noted overnight.    Labs Reviewed, Medications Reviewed, Radiology Reviewed, Wound Reviewed, Fluids Reviewed and GI Nutrition Reviewed.    Review of Systems:  Review of Systems   Constitutional: Negative.  Negative for fever and chills.   HENT: Negative.    Eyes: Negative.    Respiratory: Negative.  Negative for cough and shortness of breath.    Cardiovascular: Negative.  Negative for chest pain.   Gastrointestinal: Positive for nausea, abdominal pain (LUQ) and diarrhea. Negative for vomiting.   Musculoskeletal: Negative.    Skin: Negative.  Negative for itching and rash.   Neurological: Negative.    Endo/Heme/Allergies: Negative.    Psychiatric/Behavioral: Negative.        Hemodynamics:  Temp (24hrs), Av.8 °C (98.2 °F), Min:36.4 °C (97.5 °F), Max:37.2 °C (99 °F)  Temperature: 36.9 °C (98.4 °F)  Pulse  Av.2  Min: 53  Max: 105Heart Rate (Monitored): 80  Blood Pressure: 108/57 mmHg, NIBP: 125/65 mmHg       Physical Exam:  Physical Exam   Constitutional: She is oriented to person, place, and time. She appears well-developed and well-nourished. No distress.   HENT:   Head: Normocephalic and atraumatic.   Cardiovascular: Normal rate, regular  rhythm, normal heart sounds and intact distal pulses.  Exam reveals no gallop and no friction rub.    No murmur heard.  Pulmonary/Chest: Effort normal. No respiratory distress.   97-99% on 4LPM  Clear with faint crackles to LLL   Musculoskeletal: Normal range of motion. She exhibits no edema or tenderness.   Neurological: She is alert and oriented to person, place, and time.   Skin: Skin is warm and dry.   Psychiatric: She has a normal mood and affect. Her behavior is normal. Judgment and thought content normal.   Nursing note and vitals reviewed.      Meds:    Current facility-administered medications:   •  Linaclotide (LINZESS) CAPS 290 mcg, 290 mcg, Oral, DAILY, Josemanuel Real M.D., 290 mcg at 06/25/17 0900  •  trazodone (DESYREL) tablet 50 mg, 50 mg, Oral, QHS PRN **OR** trazodone (DESYREL) tablet 100 mg, 100 mg, Oral, QHS PRN, Brad Ruiz M.D., 100 mg at 06/25/17 0042  •  mycophenolate (CELLCEPT) capsule 500 mg, 500 mg, Oral, BID, Josemanuel Real M.D., 500 mg at 06/25/17 0940  •  tacrolimus (PROGRAF) capsule 1.5 mg, 1.5 mg, Oral, BID, Josemanuel Real M.D., 1.5 mg at 06/25/17 0940  •  furosemide (LASIX) tablet 40 mg, 40 mg, Oral, QAM, Josemanuel Real M.D., 40 mg at 06/25/17 0933  •  Tadalafil (PAH) TABS 40 mg, 40 mg, Oral, QHS, Josemanuel Real M.D., 40 mg at 06/24/17 2016  •  predniSONE (DELTASONE) tablet 20 mg, 20 mg, Oral, DAILY, Josemanuel Real M.D., 20 mg at 06/25/17 0934  •  phenylephrine (NEOSYNEPHRINE) 0.5 % nasal spray 2 Spray, 2 Spray, Nasal, TID PRN, Josemanuel Real M.D., 2 [place] at 06/25/17 0946  •  lorazepam (ATIVAN) tablet 1-2 mg, 1-2 mg, Oral, Q4HRS PRN, Josemanuel Real M.D., 2 mg at 06/25/17 0929  •  senna-docusate (PERICOLACE or SENOKOT S) 8.6-50 MG per tablet 2 Tab, 2 Tab, Oral, BID PRN, 2 Tab at 06/24/17 0916 **AND** polyethylene glycol/lytes (MIRALAX) PACKET 1 Packet, 1 Packet, Oral, QDAY PRN, 1 Packet at 06/24/17 0746 **AND** magnesium hydroxide (MILK OF MAGNESIA) suspension 30 mL, 30 mL, Oral,  QDAY PRN, 30 mL at 06/24/17 0746 **AND** bisacodyl (DULCOLAX) suppository 10 mg, 10 mg, Rectal, QDAY PRN, Brad Ruiz M.D., 10 mg at 06/24/17 0746  •  Respiratory Care per Protocol, , Nebulization, Continuous RT, Brad Ruiz M.D.  •  ondansetron (ZOFRAN) syringe/vial injection 4 mg, 4 mg, Intravenous, Q4HRS PRN, Brad Ruiz M.D., 4 mg at 06/24/17 2135  •  ondansetron (ZOFRAN ODT) dispertab 4 mg, 4 mg, Oral, Q4HRS PRN, Brad Ruiz M.D.  •  tiotropium (SPIRIVA) 18 MCG inhalation capsule 1 Cap, 1 Cap, Inhalation, DAILY, Brad Ruiz M.D., 1 Cap at 06/25/17 0942  •  sertraline (ZOLOFT) tablet 100 mg, 100 mg, Oral, QHS, Brad Ruiz M.D., 100 mg at 06/24/17 2014  •  pravastatin (PRAVACHOL) tablet 20 mg, 20 mg, Oral, DAILY, Brad Ruiz M.D., 20 mg at 06/25/17 0933  •  omeprazole (PRILOSEC) capsule 40 mg, 40 mg, Oral, DAILY, Brad Ruiz M.D., 40 mg at 06/25/17 0933  •  levothyroxine (SYNTHROID) tablet 137 mcg, 137 mcg, Oral, QDAY, Brad Ruiz M.D., 137 mcg at 06/25/17 0553  •  gabapentin (NEURONTIN) capsule 600 mg, 600 mg, Oral, TID, Brad Ruiz M.D., 600 mg at 06/25/17 0932  •  cyclobenzaprine (FLEXERIL) tablet 10 mg, 10 mg, Oral, TID PRN, Brad Ruiz M.D.  •  aspirin EC (ECOTRIN) tablet 81 mg, 81 mg, Oral, QAM, Brad Ruiz M.D., 81 mg at 06/25/17 0934  •  ambrisentan (LETAIRIS) TABS 10 mg, 10 mg, Oral, DAILY, Brad Ruiz M.D., 10 mg at 06/25/17 0900  •  alprazolam (XANAX) tablet 0.5 mg, 0.5 mg, Oral, TID PRN, Brad Ruiz M.D., 0.5 mg at 06/25/17 0557  •  Notify provider if pain remains uncontrolled, , , CONTINUOUS **AND** Use the numeric rating scale (NRS-11) on regular floors and Critical-Care Pain Observation Tool (CPOT) on ICUs/Trauma to assess pain, , , CONTINUOUS **AND** Pulse Ox (Oximetry), , , CONTINUOUS **AND** [DISCONTINUED] Pharmacy Consult Request ...Pain Management Review, , Other, PRN **AND** If patient difficult to arouse and/or has respiratory depression, stop any opiates that are currently  infusing and call a Rapid Response., , , CONTINUOUS **AND** oxycodone immediate-release (ROXICODONE) tablet 5 mg, 5 mg, Oral, Q3HRS PRN, 5 mg at 06/23/17 0822 **AND** oxycodone immediate-release (ROXICODONE) tablet 10 mg, 10 mg, Oral, Q3HRS PRN, 10 mg at 06/24/17 2129 **AND** morphine (pf) 4 mg/ml injection 4 mg, 4 mg, Intravenous, Q3HRS PRN, Brad Ruiz M.D., 4 mg at 06/25/17 0928  •  lactobacillus granules (LACTINEX/FLORANEX) packet 1 Packet, 1 Packet, Oral, TID WITH MEALS, Bhakti Apple, PHARMD, 1 Packet at 06/25/17 0945  •  NS (BOLUS) infusion 1,000 mL, 1,000 mL, Intravenous, Once PRN, Brad Ruiz M.D.  •  cefTRIAXone (ROCEPHIN) 2 g in  mL IVPB, 2 g, Intravenous, Q12HRS, Brad Ruiz M.D., Stopped at 06/25/17 1004  •  azithromycin (ZITHROMAX) 500 mg in D5W 250 mL IVPB, 500 mg, Intravenous, Q24HRS, Brad Ruiz M.D., Last Rate: 250 mL/hr at 06/25/17 1055, 500 mg at 06/25/17 1055    Labs:  Recent Labs      06/22/17   1300  06/23/17   0530  06/24/17   0636   WBC  6.6  5.0  5.1   RBC  3.96*  3.96*  3.73*   HEMOGLOBIN  11.5*  11.8*  11.0*   HEMATOCRIT  34.6*  35.5*  33.2*   MCV  87.4  89.6  89.0   MCH  29.0  29.8  29.5   RDW  43.0  45.2  44.4   PLATELETCT  56*  70*  68*   MPV  9.3  9.1  9.7   NEUTSPOLYS  87.60*  90.90*  83.20*   LYMPHOCYTES  6.50*  6.70*  11.30*   MONOCYTES  4.80  1.80  4.90   EOSINOPHILS  0.60  0.00  0.00   BASOPHILS  0.20  0.20  0.20     Recent Labs      06/22/17   1300  06/23/17   0530  06/24/17   0636   SODIUM  136  141  142   POTASSIUM  4.1  4.4  4.3   CHLORIDE  102  110  111   CO2  26  26  25   GLUCOSE  185*  242*  255*   BUN  23*  18  18     Recent Labs      06/22/17   1300  06/23/17   0530  06/24/17   0636   ALBUMIN  3.9   --    --    TBILIRUBIN  0.4   --    --    ALKPHOSPHAT  24*   --    --    TOTPROTEIN  6.3   --    --    ALTSGPT  14   --    --    ASTSGOT  16   --    --    CREATININE  1.14  0.94  0.93       Imaging:  No new imaging studies    Micro:  BLOOD CULTURE   Date Value Ref Range  Status   06/22/2017   Preliminary    No Growth    Note: Blood cultures are incubated for 5 days and  are monitored continuously.Positive blood cultures  are called to the RN and reported as soon as  they are identified.       BLOOD CULTURE HOLD   Date Value Ref Range Status   03/05/2014 Collected  Final        Assessment:  Active Hospital Problems    Diagnosis   • Septic shock (CMS-[facility]) [A41.9, R65.21]   • Sarcoidosis (CMS-HCC) [D86.9]   • Status post liver transplant Dr. Canada ([facility]) [Z94.4]   • Pulmonary hypertension (CMS-HCC) [I27.2]   • COPD (chronic obstructive pulmonary disease) (CMS-HCC) [J44.9]   • Sepsis (CMS-HCC) [A41.9]   • IDDM (insulin dependent diabetes mellitus) (CMS-HCC) [E11.9, Z79.4]   • Thrombocytopenia (CMS-HCC) [D69.6]   • Immunocompromised state (CMS-HCC) [D84.9]       Plan:  1. Continue current therapy for now. If patient continues to improve, likely transition to PO therapy in next 1-2 days. Patient hoping to go home soon.  2. Pain control per IM  3. Continue Resp. Care/O2  4. Follow up culture to maturity  5. 20 mins. >50% of time spent in direct care/coordination of care  6. D/w Patient and in collaboration with Dr. Villegas

## 2017-06-25 NOTE — PROGRESS NOTES
2200: Pt transferred to floor with transport via hospital bed. Pt A&Ox4, emotional and tearful when talk about her diagnoses. Active listening and emotional support given. Pt resting with eye close, chest rise and fall noted, NAD. central line in place, IV in place, refused bed alarm despite fall risk medication. Call light within reach, bed in low position.

## 2017-06-25 NOTE — CARE PLAN
Problem: Respiratory:  Goal: Respiratory status will improve  Outcome: PROGRESSING AS EXPECTED  Pt on 6L silicone NC with humidifier. Pt wears 5-6L at home. O2 saturations have been greater than 94%. Lungs sounds clear/diminished. LUQ pain increases with deep respirations.     Problem: Psychosocial Needs:  Goal: Level of anxiety will decrease  Outcome: PROGRESSING SLOWER THAN EXPECTED  Pt has overwhelming anxiety related to her hospital stay and with her current transfer orders. Therapeutic communication used, emotional support provided, active listening and education provided. Pt comforted and reassurance provided. Medicated per MAR as needed.

## 2017-06-25 NOTE — RESPIRATORY CARE
COPD EDUCATION by COPD CLINICAL EDUCATOR  6/25/2017 at 8:30 AM by Vicki Ruby     Patient reviewed by COPD education team. Patient does not qualify for COPD program.

## 2017-06-25 NOTE — PROGRESS NOTES
Pt to be transferred to S621. Report given to Damaris FRANCIS. All questions and concerns addressed at this time.

## 2017-06-25 NOTE — CARE PLAN
Problem: Pain Management  Goal: Pain level will decrease to patient’s comfort goal  Outcome: PROGRESSING AS EXPECTED  Pain assessed q2h, pain meds given per MAR, non-pharm method in use. Pt resting, chest rise and fall not NAD    Problem: Psychosocial Needs:  Goal: Level of anxiety will decrease  Outcome: PROGRESSING AS EXPECTED  Pt anxious about which MD going to take care of her and tearful when talking about pt's diagnosis and concerns. Active listening, emotional support given, anxiety med given per MAR

## 2017-06-25 NOTE — CARE PLAN
Problem: Pain Management  Goal: Pain level will decrease to patient’s comfort goal  Intervention: Follow pain managment plan developed in collaboration with patient and Interdisciplinary Team  Medicated with PRN morphine. Achieved good relief with this regimen.       Problem: Psychosocial Needs:  Goal: Level of anxiety will decrease  Intervention: Identify and develop with patient strategies to cope with anxiety triggers  Medicated with Ativan/xanax PRN as ordered, reassurance, therapeutic presence.

## 2017-06-25 NOTE — PROGRESS NOTES
"[facility] Hospitalist Progress Note    Date of Service: 2017    Chief Complaint  57 y.o. female  hx of liver transplant on chronic immunosuppressant therapy admitted 2017 with septic shock requiring pressor support, ID following, source of infectious unknown, cx NGTD     Interval Problem Update  No acute events overnight. Pt eager to go home. Case discussed with Dr. Villegas who would like to observe the pt overnight and dc home tomm if doing well.     Consultants/Specialty  Dr. Villegas/ ID     Disposition  Home once medically improved.         Review of Systems   Constitutional: Negative for fever, chills and malaise/fatigue.   Eyes: Negative for blurred vision and double vision.   Respiratory: Negative for cough and shortness of breath.    Cardiovascular: Negative for chest pain and palpitations.   Gastrointestinal: Positive for abdominal pain. Negative for nausea.        LUQ pain \"sharp\" in the region of the spleen.    Genitourinary: Negative for dysuria.   Neurological: Negative for headaches.      Physical Exam  Laboratory/Imaging   Hemodynamics  Temp (24hrs), Av.8 °C (98.2 °F), Min:36.4 °C (97.5 °F), Max:37.2 °C (99 °F)   Temperature: 36.9 °C (98.4 °F)  Pulse  Av.2  Min: 53  Max: 105 Heart Rate (Monitored): 80  Blood Pressure: 108/57 mmHg, NIBP: 125/65 mmHg      Respiratory      Respiration: 18, Pulse Oximetry: 95 %, O2 Daily Delivery Respiratory : Nasal Cannula        RUL Breath Sounds: Clear, RML Breath Sounds: Clear, RLL Breath Sounds: Diminished, MANJINDER Breath Sounds: Clear, LLL Breath Sounds: Diminished    Fluids    Intake/Output Summary (Last 24 hours) at 17 1147  Last data filed at 17   Gross per 24 hour   Intake    560 ml   Output   1400 ml   Net   -840 ml       Nutrition  Orders Placed This Encounter   Procedures   • Diet Order     Standing Status: Standing      Number of Occurrences: 1      Standing Expiration Date:      Order Specific Question:  Diet:     Answer:  Diabetic " "[3]     Physical Exam   Constitutional: She is oriented to person, place, and time. No distress.   Neck: Neck supple.   Right IJ central line    Cardiovascular: Normal rate and regular rhythm.    No murmur heard.  Pulmonary/Chest: Effort normal. No respiratory distress. She has no rales.   Abdominal: Soft. She exhibits no distension. There is no rebound.   Mild LUQ tenderness.   Musculoskeletal: She exhibits no tenderness.   Swelling of the hands.   Neurological: She is alert and oriented to person, place, and time.   Skin: She is not diaphoretic. There is pallor.   Scabs bilat proximal shins. Right shin skin lesion \"from sarcoid\"    Psychiatric: She has a normal mood and affect. Her behavior is normal.       Recent Labs      06/22/17   1300  06/23/17   0530  06/24/17   0636   WBC  6.6  5.0  5.1   RBC  3.96*  3.96*  3.73*   HEMOGLOBIN  11.5*  11.8*  11.0*   HEMATOCRIT  34.6*  35.5*  33.2*   MCV  87.4  89.6  89.0   MCH  29.0  29.8  29.5   MCHC  33.2*  33.2*  33.1*   RDW  43.0  45.2  44.4   PLATELETCT  56*  70*  68*   MPV  9.3  9.1  9.7     Recent Labs      06/22/17   1300  06/23/17   0530  06/24/17   0636   SODIUM  136  141  142   POTASSIUM  4.1  4.4  4.3   CHLORIDE  102  110  111   CO2  26  26  25   GLUCOSE  185*  242*  255*   BUN  23*  18  18   CREATININE  1.14  0.94  0.93   CALCIUM  9.2  8.1*  8.1*     Recent Labs      06/22/17   1300  06/22/17   1925   APTT  27.9  30.3   INR  1.02  1.09                  Assessment/Plan     Septic shock (CMS-HCC) (present on admission)  Assessment & Plan  Source remains cryptic, off pressors   IV fluids given.  Broad spectrum IV abx, ID following  CSF negative, blood cx NGTD       Status post liver transplant Dr. Canada ([facility]) (present on admission)  Assessment & Plan  On cellcept and prograf.    Sarcoidosis (CMS-HCC) (present on admission)  Assessment & Plan  Hx of. Followed by pulmonary.    Immunocompromised state (CMS-HCC) (present on admission)  Assessment & " Plan  Secondary to immunosuppressants     Thrombocytopenia (CMS-[facility]) (present on admission)  Assessment & Plan  Chronic     IDDM (insulin dependent diabetes mellitus) (CMS-HCC) (present on admission)  Assessment & Plan  On home insulin    COPD (chronic obstructive pulmonary disease) (CMS-HCC) (present on admission)  Assessment & Plan  She has been followed by pulmonology in the past.    Pulmonary hypertension (CMS-HCC) (present on admission)  Assessment & Plan  Followed by Dr. Phan.  On Tadalafil and Letairis.  Restart lasix 40 mg daily.      Labs reviewed and Medications reviewed  Holbrook catheter: No Holbrook  Central line in place: Sepsis    DVT Prophylaxis: Contraindicated - High bleeding risk (s/p lumbar puncture with platelets in the 60's)      Antibiotics: Treating active infection/contamination beyond 24 hours perioperative coverage

## 2017-06-26 ENCOUNTER — APPOINTMENT (OUTPATIENT)
Dept: RADIOLOGY | Facility: MEDICAL CENTER | Age: 57
DRG: 871 | End: 2017-06-26
Attending: INTERNAL MEDICINE
Payer: MEDICARE

## 2017-06-26 ENCOUNTER — PATIENT OUTREACH (OUTPATIENT)
Dept: HEALTH INFORMATION MANAGEMENT | Facility: OTHER | Age: 57
End: 2017-06-26

## 2017-06-26 VITALS
BODY MASS INDEX: 26.73 KG/M2 | TEMPERATURE: 97.9 F | RESPIRATION RATE: 16 BRPM | SYSTOLIC BLOOD PRESSURE: 145 MMHG | HEIGHT: 68 IN | HEART RATE: 71 BPM | DIASTOLIC BLOOD PRESSURE: 71 MMHG | WEIGHT: 176.37 LBS | OXYGEN SATURATION: 98 %

## 2017-06-26 PROBLEM — R65.21 SEPTIC SHOCK(785.52): Status: RESOLVED | Noted: 2017-06-23 | Resolved: 2017-06-26

## 2017-06-26 PROBLEM — A41.9 SEPSIS, UNSPECIFIED: Status: RESOLVED | Noted: 2017-06-24 | Resolved: 2017-06-26

## 2017-06-26 PROBLEM — A41.9 SEPTIC SHOCK(785.52): Status: RESOLVED | Noted: 2017-06-23 | Resolved: 2017-06-26

## 2017-06-26 PROCEDURE — A9270 NON-COVERED ITEM OR SERVICE: HCPCS | Performed by: PHARMACIST

## 2017-06-26 PROCEDURE — A9270 NON-COVERED ITEM OR SERVICE: HCPCS | Performed by: INTERNAL MEDICINE

## 2017-06-26 PROCEDURE — 700102 HCHG RX REV CODE 250 W/ 637 OVERRIDE(OP): Performed by: INTERNAL MEDICINE

## 2017-06-26 PROCEDURE — 700105 HCHG RX REV CODE 258: Performed by: INTERNAL MEDICINE

## 2017-06-26 PROCEDURE — 700102 HCHG RX REV CODE 250 W/ 637 OVERRIDE(OP): Performed by: HOSPITALIST

## 2017-06-26 PROCEDURE — 700111 HCHG RX REV CODE 636 W/ 250 OVERRIDE (IP): Performed by: INTERNAL MEDICINE

## 2017-06-26 PROCEDURE — 71020 DX-CHEST-2 VIEWS: CPT

## 2017-06-26 PROCEDURE — A9270 NON-COVERED ITEM OR SERVICE: HCPCS | Performed by: HOSPITALIST

## 2017-06-26 PROCEDURE — 99239 HOSP IP/OBS DSCHRG MGMT >30: CPT | Performed by: INTERNAL MEDICINE

## 2017-06-26 PROCEDURE — 700102 HCHG RX REV CODE 250 W/ 637 OVERRIDE(OP): Performed by: PHARMACIST

## 2017-06-26 PROCEDURE — 700111 HCHG RX REV CODE 636 W/ 250 OVERRIDE (IP): Performed by: HOSPITALIST

## 2017-06-26 RX ORDER — AZITHROMYCIN 250 MG/1
250 TABLET, FILM COATED ORAL DAILY
Qty: 4 TAB | Refills: 0 | Status: SHIPPED | OUTPATIENT
Start: 2017-06-26 | End: 2017-10-03

## 2017-06-26 RX ORDER — FUROSEMIDE 40 MG/1
TABLET ORAL
Qty: 30 TAB | Refills: 6 | Status: ON HOLD | OUTPATIENT
Start: 2017-06-26 | End: 2017-10-31

## 2017-06-26 RX ADMIN — MORPHINE SULFATE 4 MG: 4 INJECTION INTRAVENOUS at 08:15

## 2017-06-26 RX ADMIN — TIOTROPIUM BROMIDE 1 CAPSULE: 18 CAPSULE ORAL; RESPIRATORY (INHALATION) at 08:32

## 2017-06-26 RX ADMIN — ONDANSETRON 4 MG: 4 TABLET, ORALLY DISINTEGRATING ORAL at 11:28

## 2017-06-26 RX ADMIN — LEVOTHYROXINE SODIUM 137 MCG: 137 TABLET ORAL at 05:43

## 2017-06-26 RX ADMIN — LACTOBACILLUS ACIDOPHILUS / LACTOBACILLUS BULGARICUS 1 PACKET: 100 MILLION CFU STRENGTH GRANULES at 11:26

## 2017-06-26 RX ADMIN — ASPIRIN 81 MG: 81 TABLET ORAL at 08:30

## 2017-06-26 RX ADMIN — MYCOPHENOLATE MOFETIL 500 MG: 250 CAPSULE ORAL at 08:27

## 2017-06-26 RX ADMIN — GABAPENTIN 600 MG: 300 CAPSULE ORAL at 08:28

## 2017-06-26 RX ADMIN — LORAZEPAM 1 MG: 1 TABLET ORAL at 04:39

## 2017-06-26 RX ADMIN — PRAVASTATIN SODIUM 20 MG: 20 TABLET ORAL at 08:31

## 2017-06-26 RX ADMIN — LACTOBACILLUS ACIDOPHILUS / LACTOBACILLUS BULGARICUS 1 PACKET: 100 MILLION CFU STRENGTH GRANULES at 08:15

## 2017-06-26 RX ADMIN — CEFTRIAXONE SODIUM 2 G: 2 INJECTION, POWDER, FOR SOLUTION INTRAMUSCULAR; INTRAVENOUS at 08:27

## 2017-06-26 RX ADMIN — FUROSEMIDE 40 MG: 40 TABLET ORAL at 08:29

## 2017-06-26 RX ADMIN — AMBRISENTAN 10 MG: 10 TABLET, FILM COATED ORAL at 08:33

## 2017-06-26 RX ADMIN — PREDNISONE 15 MG: 5 TABLET ORAL at 08:29

## 2017-06-26 RX ADMIN — LORAZEPAM 2 MG: 1 TABLET ORAL at 08:40

## 2017-06-26 RX ADMIN — MORPHINE SULFATE 4 MG: 4 INJECTION INTRAVENOUS at 11:22

## 2017-06-26 RX ADMIN — AZITHROMYCIN FOR INJECTION INJECTION, POWDER, LYOPHILIZED, FOR SOLUTION 500 MG: 500 INJECTION INTRAVENOUS at 09:24

## 2017-06-26 RX ADMIN — OMEPRAZOLE 40 MG: 20 CAPSULE, DELAYED RELEASE ORAL at 08:28

## 2017-06-26 RX ADMIN — PHENYLEPHRINE HYDROCHLORIDE 2 SPRAY: 0.5 SPRAY NASAL at 08:34

## 2017-06-26 RX ADMIN — TACROLIMUS 1.5 MG: 1 CAPSULE ORAL at 08:30

## 2017-06-26 RX ADMIN — ALPRAZOLAM 0.5 MG: 0.5 TABLET ORAL at 11:28

## 2017-06-26 RX ADMIN — MORPHINE SULFATE 4 MG: 4 INJECTION INTRAVENOUS at 04:39

## 2017-06-26 ASSESSMENT — ENCOUNTER SYMPTOMS
CHILLS: 0
VOMITING: 0
NAUSEA: 0
DIARRHEA: 0
COUGH: 0
SHORTNESS OF BREATH: 1
CONSTIPATION: 0
HEARTBURN: 0
ABDOMINAL PAIN: 1
FEVER: 0

## 2017-06-26 ASSESSMENT — PAIN SCALES - GENERAL
PAINLEVEL_OUTOF10: 9
PAINLEVEL_OUTOF10: 9
PAINLEVEL_OUTOF10: 6
PAINLEVEL_OUTOF10: 8

## 2017-06-26 NOTE — PROGRESS NOTES
Late Entry 0835AM: Pt is AOx4, complains of pain, medicated with PRN pain medications, tolerated all oral medications, skin and sacral assessment done, SCDs on, on 4L of O2 per NC, call light in use, no bed alarm on, charge RN aware, treaded socks on, bed locked in low position, plan of care discussed, all needs met at this time. Possible DC today, okay to remove central line per Dr. Reese, see nursing communication.

## 2017-06-26 NOTE — PROGRESS NOTES
Assumed care of pt at shift change, discussed POC. Pt A&Ox4. PICC line in place, NS running TKO,  IV in place. Pt pleasant and cooperative. Refused bed alarm despite fall risk education, up-self, treaded socks on, call light within reach, bed in low position.

## 2017-06-26 NOTE — CARE PLAN
Problem: Infection  Goal: Will remain free from infection  Outcome: PROGRESSING AS EXPECTED  Monitor s/s of infection, VSS,  IV antibiotic given per MAR, Chlohexidine bath given    Problem: Venous Thromboembolism (VTW)/Deep Vein Thrombosis (DVT) Prevention:  Goal: Patient will participate in Venous Thrombosis (VTE)/Deep Vein Thrombosis (DVT)Prevention Measures  Outcome: PROGRESSING AS EXPECTED  SCD in place    Problem: Pain Management  Goal: Pain level will decrease to patient’s comfort goal  Outcome: PROGRESSING SLOWER THAN EXPECTED  Prn pain medication given per MAR, pt comfort at rest and sleeping, chest rise and fall noted, NAD

## 2017-06-26 NOTE — DISCHARGE PLANNING
Care Transition Team Assessment    IHD met with patient and spouse bedside. She stated she lives with her  and he drives her to doctor's appointments. She normally doesn't need any DME, but does use O2 24/7 at home through [facility]. She does not expect to need any further services after she discharges home.     Information Source  Orientation : Oriented x 4  Information Given By: Patient  Informant's Name: Roxana  Who is responsible for making decisions for patient? : Patient         Elopement Risk  Legal Hold: No  Ambulatory or Self Mobile in Wheelchair: Yes  Disoriented: No  Psychiatric Symptoms: None  History of Wandering: No  Elopement this Admit: No  Vocalizing Wanting to Leave: No  Displays Behaviors, Body Language Wanting to Leave: No-Not at Risk for Elopement  Elopement Risk: Not at Risk for Elopement    Interdisciplinary Discharge Planning  Does Admitting Nurse Feel This Could be a Complex Discharge?: No  Primary Care Physician: Dr. Bernarda Reyes  Lives with - Patient's Self Care Capacity: Spouse  Patient or legal guardian wants to designate a caregiver (see row info): No  Support Systems: Family Member(s), Spouse / Significant Other, Friends / Neighbors  Housing / Facility: 1 Story Apartment / Condo (stairs to get in /out)  Do You Take your Prescribed Medications Regularly: Yes  Able to Return to Previous ADL's: Yes  Mobility Issues: No  Prior Services: None  Patient Expects to be Discharged to:: Home  Assistance Needed: No  Durable Medical Equipment: Home Oxygen, Walker, Commode  DME Provider / Phone: [facility]    Discharge Preparedness  What is your plan after discharge?: Home with help  What are your discharge supports?: Spouse, Child  Prior Functional Level: Ambulatory, Independent with Activities of Daily Living, Independent with Medication Management  Difficulity with ADLs: None  Difficulity with IADLs: Driving  Difficulity with IADL Comments: Spouse drives    Functional  Assesment  Prior Functional Level: Ambulatory, Independent with Activities of Daily Living, Independent with Medication Management    Finances  Financial Barriers to Discharge: No  Prescription Coverage: Yes ([facility] on [facility])    Vision / Hearing Impairment  Vision Impairment : Yes  Right Eye Vision: Wears Glasses  Left Eye Vision: Wears Glasses  Hearing Impairment : No    Values / Beliefs / Concerns  Values / Beliefs Concerns : No         Domestic Abuse  Have you ever been the victim of abuse or violence?: No  Physical Abuse or Sexual Abuse: No  Verbal Abuse or Emotional Abuse: No  Possible Abuse Reported to:: Not Applicable    Psychological Assessment  History of Substance Abuse: None  History of Psychiatric Problems: No  Non-compliant with Treatment: No  Newly Diagnosed Illness: No    Discharge Risks or Barriers  Discharge risks or barriers?: No  Patient risk factors: Complex medical needs    Anticipated Discharge Information  Anticipated discharge disposition: Discharge needs currently unknown  Discharge Address: [facility] [address] [address]  Discharge Contact Phone Number: [phone]

## 2017-06-26 NOTE — DISCHARGE INSTRUCTIONS
Discharge Instructions    Discharged to home by car with relative. Discharged via wheelchair, hospital escort: Yes.  Special equipment needed: Oxygen    Be sure to schedule a follow-up appointment with your primary care doctor or any specialists as instructed.     Discharge Plan:   Diet Plan: Discussed (diabetic, heart healthy)  Activity Level: Discussed (as tolerated)  Confirmed Follow up Appointment: Patient to Call and Schedule Appointment  Confirmed Symptoms Management: Discussed  Medication Reconciliation Updated: Yes  Influenza Vaccine Indication: Not indicated: Previously immunized this influenza season and > 8 years of age    I understand that a diet low in cholesterol, fat, and sodium is recommended for good health. Unless I have been given specific instructions below for another diet, I accept this instruction as my diet prescription.   Other diet: diabetic heart healthy    Special Instructions: None    · Is patient discharged on Warfarin / Coumadin?   No     · Is patient Post Blood Transfusion?  No    Depression / Suicide Risk    As you are discharged from this [facility] facility, it is important to learn how to keep safe from harming yourself.    Recognize the warning signs:  · Abrupt changes in personality, positive or negative- including increase in energy   · Giving away possessions  · Change in eating patterns- significant weight changes-  positive or negative  · Change in sleeping patterns- unable to sleep or sleeping all the time   · Unwillingness or inability to communicate  · Depression  · Unusual sadness, discouragement and loneliness  · Talk of wanting to die  · Neglect of personal appearance   · Rebelliousness- reckless behavior  · Withdrawal from people/activities they love  · Confusion- inability to concentrate     If you or a loved one observes any of these behaviors or has concerns about self-harm, here's what you can do:  · Talk about it- your feelings and reasons for harming  yourself  · Remove any means that you might use to hurt yourself (examples: pills, rope, extension cords, firearm)  · Get professional help from the community (Mental Health, Substance Abuse, psychological counseling)  · Do not be alone:Call your Safe Contact- someone whom you trust who will be there for you.  · Call your local CRISIS HOTLINE 052-2506 or 589-147-7012  · Call your local Children's Mobile Crisis Response Team [place] (849) 952-9446 or wwwTV Compass  · Call the toll free National Suicide Prevention Hotlines   · National Suicide Prevention Lifeline 689-489-AWFO (8536)  · National Hope Line Network 800-SUICIDE (582-2829)      Sepsis, Adult  Sepsis is a serious infection of your blood or tissues that affects your whole body. The infection that causes sepsis may be bacterial, viral, fungal, or parasitic. Sepsis may be life threatening. Sepsis can cause your blood pressure to drop. This may result in shock. Shock causes your central nervous system and your organs to stop working correctly.   RISK FACTORS  Sepsis can happen in anyone, but it is more likely to happen in people who have weakened immune systems.  SIGNS AND SYMPTOMS   Symptoms of sepsis can include:  · Fever or low body temperature (hypothermia).  · Rapid breathing (hyperventilation).  · Chills.  · Rapid heartbeat (tachycardia).  · Confusion or light-headedness.  · Trouble breathing.  · Urinating much less than usual.  · Cool, clammy skin or red, flushed skin.  · Other problems with the heart, kidneys, or brain.  DIAGNOSIS   Your health care provider will likely do tests to look for an infection, to see if the infection has spread to your blood, and to see how serious your condition is. Tests can include:  · Blood tests, including cultures of your blood.  · Cultures of other fluids from your body, such as:  ¨ Urine.  ¨ Pus from wounds.  ¨ Mucus coughed up from your lungs.  · Urine tests other than cultures.  · X-ray exams or other  imaging tests.  TREATMENT   Treatment will begin with elimination of the source of infection. If your sepsis is likely caused by a bacterial or fungal infection, you will be given antibiotic or antifungal medicines.  You may also receive:  · Oxygen.  · Fluids through an IV tube.  · Medicines to increase your blood pressure.  · A machine to clean your blood (dialysis) if your kidneys fail.  · A machine to help you breathe if your lungs fail.  SEEK IMMEDIATE MEDICAL CARE IF:  You get an infection or develop any of the signs and symptoms of sepsis after surgery or a hospitalization.     This information is not intended to replace advice given to you by your health care provider. Make sure you discuss any questions you have with your health care provider.     Document Released: 09/15/2004 Document Revised: 05/03/2016 Document Reviewed: 08/25/2014  [employer] Interactive Patient Education ©2016 [employer]

## 2017-06-26 NOTE — CARE PLAN
Problem: Discharge Barriers/Planning  Goal: Patient’s continuum of care needs will be met  Outcome: PROGRESSING AS EXPECTED  Poss DC today per MD during rounds.     Problem: Pain Management  Goal: Pain level will decrease to patient’s comfort goal  Outcome: PROGRESSING SLOWER THAN EXPECTED  Pt constantly in pain, medicated with PRN Morphine IV, will CTM for pain

## 2017-06-26 NOTE — PROGRESS NOTES
Discharging patient home per physician order.  Discharged with relative via wheelchair accompanied by volunteer.  Demonstrated understanding of discharge instructions, follow up appointments, home medications, prescriptions.  Ambulating with minimal assistance, voiding without difficulty, pain well controlled, tolerating oral medications, oxygen saturation greater than 90% , tolerating diet.   Educational handouts about sepsis given and discussed.  Verbalized understanding of discharge instructions and educational handouts.  All questions answered.  Belongings with patient at time of discharge.

## 2017-06-26 NOTE — PROGRESS NOTES
Infectious Disease Progress Note    Author: Julito Villegas Date & Time created: 6/26/2017  8:41 AM    Interval History:  Past 24 hrs reviewed with Staff.    Labs Reviewed, Medications Reviewed, Radiology Reviewed, Wound Reviewed, Fluids Reviewed and GI Nutrition Reviewed.    Review of Systems:  Review of Systems   Constitutional: Negative for fever and chills.   Respiratory: Positive for shortness of breath (the usual). Negative for cough.    Cardiovascular: Negative for chest pain.   Gastrointestinal: Positive for abdominal pain. Negative for heartburn, nausea, vomiting (LUQ but decreasing), diarrhea and constipation.   Genitourinary: Negative for dysuria, urgency and frequency.   Skin: Negative for itching and rash.       Physical Exam:  Physical Exam   Constitutional: She is oriented to person, place, and time. She appears well-developed.   HENT:   Head: Normocephalic and atraumatic.   Cardiovascular: Normal rate and regular rhythm.    Pulmonary/Chest: Effort normal. No respiratory distress. She has no wheezes.   A few rales in bases L>R but clearly less than yesterday,    Abdominal: She exhibits no distension. There is tenderness (LUQ tenderness (spleen) decreasing daily). There is no rebound and no guarding.   Neurological: She is alert and oriented to person, place, and time.   Skin: Skin is dry.   Psychiatric: She has a normal mood and affect. Her behavior is normal.   Nursing note and vitals reviewed.      Labs:  Recent Results (from the past 24 hour(s))   ACCU-CHEK GLUCOSE    Collection Time: 06/25/17 12:18 PM   Result Value Ref Range    Glucose - Accu-Ck 141 (H) 65 - 99 mg/dL     Results     Procedure Component Value Units Date/Time    CSF CULTURE [[id number]] Collected:  06/22/17 2005    Order Status:  Completed Specimen Information:  CSF from Tap Updated:  06/25/17 0833     Gram Stain Result No organisms seen.      Significant Indicator NEG      Source CSF      Site TAP      CSF Culture No growth at 72  "hours.     URINE CULTURE(NEW) [[id number]] Collected:  06/22/17 1430    Order Status:  Completed Specimen Information:  Urine Updated:  06/24/17 0759     Significant Indicator NEG      Source UR      Site --      Urine Culture No growth at 48 hours     Narrative:      Indication for culture:->Emergency Room Patient    BLOOD CULTURE [[id number]] Collected:  06/22/17 1345    Order Status:  Completed Specimen Information:  Blood from Peripheral Updated:  06/23/17 1014     Significant Indicator NEG      Source BLD      Site PERIPHERAL      Blood Culture --      Result:        No Growth    Note: Blood cultures are incubated for 5 days and  are monitored continuously.Positive blood cultures  are called to the RN and reported as soon as  they are identified.      Narrative:      Per Hospital Policy: Only change Specimen Src: to \"Line\" if  specified by physician order.    BLOOD CULTURE [[id number]] Collected:  06/22/17 1420    Order Status:  Completed Specimen Information:  Blood from Peripheral Updated:  06/23/17 1014     Significant Indicator NEG      Source BLD      Site PERIPHERAL      Blood Culture --      Result:        No Growth    Note: Blood cultures are incubated for 5 days and  are monitored continuously.Positive blood cultures  are called to the RN and reported as soon as  they are identified.      Narrative:      Per Hospital Policy: Only change Specimen Src: to \"Line\" if  specified by physician order.    GRAM STAIN [[id number]] Collected:  06/22/17 2005    Order Status:  Completed Specimen Information:  CSF Updated:  06/22/17 2127     Significant Indicator .      Source CSF      Site TAP      Gram Stain Result No organisms seen.     Influenza By PCR, A/B/H1N1 [[id number]] Collected:  06/22/17 1350    Order Status:  Completed Specimen Information:  Respirate from Nasopharyngeal Updated:  06/22/17 1542     Influenza virus A RNA Negative      Influenza virus B RNA Negative      Influenza A 2009, H1N1, PCR Not Detected " "    URINALYSIS [[id number]] Collected:  06/22/17 1430    Order Status:  Completed Specimen Information:  Urine Updated:  06/22/17 1443     Color Colorless      Character Clear      Specific Gravity 1.005      Ph 6.5      Glucose Negative mg/dL      Ketones Negative mg/dL      Protein Negative mg/dL      Bilirubin Negative      Nitrite Negative      Leukocyte Esterase Negative      Occult Blood Negative      Micro Urine Req see below      Comment: Microscopic examination not performed when specimen is clear  and chemically negative for protein, blood, leukocyte esterase  and nitrite.         Narrative:      Indication for culture:->Emergency Room Patient    INFLUENZA RAPID [[id number]] Collected:  06/22/17 1350    Order Status:  Completed Specimen Information:  Respirate from Respiratory Updated:  06/22/17 1426     Significant Indicator NEG      Source RESP      Site Nasopharyngeal      Rapid Influenza A-B --      Result:        Negative for Influenza A and Influenza B antigens.  Infection due to influenza A or B cannot be ruled out  since the antigen present in the specimen may be below the  detection limit of the test. Culture confirmation of  negative samples is recommended.      Influenza Rapid [[id number]] Collected:  06/22/17 0000    Order Status:  Canceled Specimen Information:  Other from Respiratory     Narrative:      ORDER WAS CANCELLED 06/22/2017 13:58,  error. 06/22/2017  13:58.    Blood Culture [[id number]] Collected:  06/22/17 0000    Order Status:  Canceled Specimen Information:  Other from Peripheral     Narrative:      TEST Blood Culture WAS CANCELLED, 06/22/2017 19:37 Duplicate .  From different peripheral sites, if not done within the last  24 hours (Per Hospital Policy: Only change specimen source to  \"Line\" if specified by physician order)    Blood Culture [[id number]] Collected:  06/22/17 0000    Order Status:  Canceled Specimen Information:  Other from Peripheral     " "Narrative:      TEST Blood Culture WAS CANCELLED, 2017 19:37 Duplicate .  From different peripheral sites, if not done within the last  24 hours (Per Hospital Policy: Only change specimen source to  \"Line\" if specified by physician order)    Urinalysis [[id number]] Collected:  17 0000    Order Status:  Canceled Specimen Information:  Urine     Narrative:      TEST Urinalysis WAS CANCELLED, 17 01:05 Test autocancelled, not  collected or received  If not done within the last 24 hours  Sputum cultures, induced if needed. If not done within the  last 24 hours    Culture Respiratory W/ GRM STN [[id number]] Collected:  17 0000    Order Status:  Canceled Specimen Information:  Sputum from Sputum     Narrative:      TEST Respiratory Culture w/ GS WAS CANCELLED, 17 01:05 Test  autocancelled, not collected or received  If not done within the last 24 hours  Sputum cultures, induced if needed. If not done within the  last 24 hours            Hemodynamics:  Temp (24hrs), Av.5 °C (97.7 °F), Min:36.1 °C (96.9 °F), Max:36.7 °C (98.1 °F)  Temperature: 36.6 °C (97.9 °F)  Pulse  Av.6  Min: 53  Max: 105   Blood Pressure: 145/71 mmHg     PIV Group Right Forearm 20g Flexible Catheter;Saline Lock (Active)   Line Secured Taped;Transparent 2017  7:50 PM   Site Condition / Description Assessed;Patent;Clean;Dry;Intact 2017  7:50 PM   Dressing Type / Description Transparent;Clean;Dry;Intact 2017  7:50 PM   Dressing Status Observed 2017  7:50 PM   Saline Locked Yes 2017  7:50 PM   Infiltration Grading Used by [facility] 0 2017  7:50 PM   Phlebitis Scale (Used by [facility]) 0 2017  7:50 PM       PIV Group Left Forearm 20g Flexible Catheter;Saline Lock (Active)   Line Secured Taped;Transparent 2017  7:50 PM   Site Condition / Description Assessed;Patent;Clean;Dry;Intact 2017  7:50 PM   Dressing Type / Description Transparent;Clean;Dry;Intact 2017  " 7:50 PM   Dressing Status Observed 6/25/2017  7:50 PM   Saline Locked Yes 6/25/2017  7:50 PM   Infiltration Grading [place] [facility] [place] 6/25/2017  7:50 PM   Phlebitis Scale (Used by [facility]) 0 6/25/2017  7:50 PM   Date Primary Tubing Changed 06/23/17 6/22/2017  8:55 PM   Date Secondary Tubing Changed 06/23/17 6/22/2017  8:55 PM   NEXT Primary Tubing Change  06/24/17 6/22/2017  8:55 PM   NEXT Secondary Tubing Change  06/24/17 6/22/2017  8:55 PM       Central Line Group 1 (A) Left;Internal Jugular Triple Lumen (Active)   Line Secured Sutured in Place;Transparent 6/25/2017  7:50 PM   Patency and Function Check Performed at Beginning of Shift 6/25/2017  7:50 PM   Line Necessity Assessed Antibiotic Therapy Greater than 7 Days 6/25/2017  7:50 PM   Consider Removal of Femoral Line Not Applicable 6/25/2017  7:50 PM   Closed Tubing Set Up Yes 6/25/2017  7:50 PM   Hand Washing / Gloves Prior to Every Access Yes 6/25/2017  7:50 PM   Next Daily Chlorhexidine Bath Due (Regional ONLY) 06/26/17 6/25/2017  7:50 PM   Port Access  Scrub the Hub Prior to Access 6/25/2017  7:50 PM   Site Condition / Description Assessed;Patent;Clean;Dry;Intact 6/25/2017  7:50 PM   Signs and Symptoms of Infection None Apparent at this Time 6/25/2017  7:50 PM   Dressing Type / Description Antimicrobial Patch (BioPatch);Clean;Dry;Intact 6/25/2017  7:50 PM   Dressing Status Observed 6/25/2017  7:50 PM   Next Dressing Change  06/01/17 6/25/2017  7:50 PM   Date Primary Tubing Changed 06/24/17 6/25/2017  7:50 PM   Date Secondary Tubing Changed 06/25/17 6/25/2017  7:50 PM   NEXT Primary Tubing Change  06/27/17 6/25/2017  7:50 PM   NEXT Secondary Tubing Change  06/26/17 6/25/2017  7:50 PM   Date IV Connector(s) Changed 06/26/17 6/25/2017  7:50 PM   NEXT IV Connector(s) Change Date 06/20/17 6/25/2017  7:50 PM       Wound:  Surgical Incision  Incision Left Breast (Active)          Fluids:  Intake/Output       06/24/17 0700 - 06/25/17 0659 06/25/17 0700 -  06/26/17 0659 06/26/17 0700 - 06/27/17 0659      0700-1859 1900-0659 Total 0700-1859 1900-0659 Total 5697-0881 0337-2914 Total       Intake    P.O.  970  240 1210  --  -- --  --  -- --    P.O.  -- -- -- -- -- --    I.V.  --  100 100  --  100 100  --  -- --    IV Piggyback Volume (Rocephin) -- 100 100 -- 100 100 -- -- --    Total Intake  -- 100 100 -- -- --       Output    Urine  1900  300 2200  --  -- --  --  -- --    Number of Times Voided 3 x 1 x 4 x -- -- -- -- -- --    Void (ml) 3984 929 0426 -- -- -- -- -- --    Stool/Urine  750  -- 750  --  -- --  --  -- --    Measurable Stool (ml) 750 -- 750 -- -- -- -- -- --    Stool  --  -- --  --  -- --  --  -- --    Number of Times Stooled 3 x 0 x 3 x 1 x 2 x 3 x -- -- --    Total Output 2650 300 2950 -- -- -- -- -- --       Net I/O     -1680 40 -1640 -- 100 100 -- -- --             GI/Nutrition:  Orders Placed This Encounter   Procedures   • Diet Order     Standing Status: Standing      Number of Occurrences: 1      Standing Expiration Date:      Order Specific Question:  Diet:     Answer:  Diabetic [3]       Medications:  Current Facility-Administered Medications   Medication Last Dose   • predniSONE (DELTASONE) tablet 15 mg 15 mg at 06/26/17 0829   • Linaclotide (LINZESS) CAPS 290 mcg 290 mcg at 06/26/17 0834   • trazodone (DESYREL) tablet 50 mg      Or   • trazodone (DESYREL) tablet 100 mg 100 mg at 06/25/17 0042   • mycophenolate (CELLCEPT) capsule 500 mg 500 mg at 06/26/17 0827   • tacrolimus (PROGRAF) capsule 1.5 mg 1.5 mg at 06/26/17 0830   • furosemide (LASIX) tablet 40 mg 40 mg at 06/26/17 0829   • Tadalafil (PAH) TABS 40 mg 40 mg at 06/25/17 2100   • phenylephrine (NEOSYNEPHRINE) 0.5 % nasal spray 2 Spray 2 Spray at 06/26/17 0834   • lorazepam (ATIVAN) tablet 1-2 mg 1 mg at 06/26/17 0439   • senna-docusate (PERICOLACE or SENOKOT S) 8.6-50 MG per tablet 2 Tab 2 Tab at 06/24/17 0916    And   • polyethylene glycol/lytes (MIRALAX) PACKET 1  Packet 1 Packet at 06/24/17 0746    And   • magnesium hydroxide (MILK OF MAGNESIA) suspension 30 mL 30 mL at 06/24/17 0746    And   • bisacodyl (DULCOLAX) suppository 10 mg 10 mg at 06/24/17 0746   • Respiratory Care per Protocol     • ondansetron (ZOFRAN) syringe/vial injection 4 mg 4 mg at 06/24/17 2135   • ondansetron (ZOFRAN ODT) dispertab 4 mg     • tiotropium (SPIRIVA) 18 MCG inhalation capsule 1 Cap 1 Cap at 06/26/17 0832   • sertraline (ZOLOFT) tablet 100 mg 100 mg at 06/25/17 2045   • pravastatin (PRAVACHOL) tablet 20 mg 20 mg at 06/26/17 0831   • omeprazole (PRILOSEC) capsule 40 mg 40 mg at 06/26/17 0828   • levothyroxine (SYNTHROID) tablet 137 mcg 137 mcg at 06/26/17 0543   • gabapentin (NEURONTIN) capsule 600 mg 600 mg at 06/26/17 0828   • cyclobenzaprine (FLEXERIL) tablet 10 mg     • aspirin EC (ECOTRIN) tablet 81 mg 81 mg at 06/26/17 0830   • ambrisentan (LETAIRIS) TABS 10 mg 10 mg at 06/26/17 0833   • alprazolam (XANAX) tablet 0.5 mg 0.5 mg at 06/25/17 2146   • oxycodone immediate-release (ROXICODONE) tablet 5 mg 5 mg at 06/23/17 0822    And   • oxycodone immediate-release (ROXICODONE) tablet 10 mg 10 mg at 06/24/17 2129    And   • morphine (pf) 4 mg/ml injection 4 mg 4 mg at 06/26/17 0815   • lactobacillus granules (LACTINEX/FLORANEX) packet 1 Packet 1 Packet at 06/26/17 0815   • NS (BOLUS) infusion 1,000 mL     • cefTRIAXone (ROCEPHIN) 2 g in  mL IVPB 2 g at 06/26/17 0827   • azithromycin (ZITHROMAX) 500 mg in D5W 250 mL IVPB Stopped at 06/25/17 1155       Medical Decision Making, by Problem:  Active Hospital Problems    Diagnosis   • Septic shock (CMS-HCC) [A41.9, R65.21]   • Sarcoidosis (CMS-HCC) [D86.9]   • Status post liver transplant Dr. [place] ([facility]) [Z94.4]   • Pulmonary hypertension (CMS-HCC) [I27.2]   • COPD (chronic obstructive pulmonary disease) (CMS-HCC) [J44.9]   • Sepsis (CMS-HCC) [A41.9]   • IDDM (insulin dependent diabetes mellitus) (CMS-HCC) [E11.9, Z79.4]   •  Thrombocytopenia (CMS-[facility]) [D69.6]   • Immunocompromised state (CMS-[facility]) [D84.9]       Plan:  Her CXR is still pending despite order to be done earlier today. She looks better daily and I think she probably has a viral infection but can't be totally sure so treat to 14 days coverage with azithromycin. She does not need to schedule a F/U visit with us unless she has problems as she already as multiple outpatient MD's following her. I have given her ID D/C instructions and answered all of her ID questions. I roxana taper her Prednisone back  to her base line dosing quickly 2-3 days if tolerated. Case and treatment reviewed at length with patient, office and Dr. Reese >/= 40 min on floor in direct patient care/counseling/consulting and D/C planning today.

## 2017-06-26 NOTE — DISCHARGE SUMMARY
CHIEF COMPLAINT ON ADMISSION  Chief Complaint   Patient presents with   • LUQ Pain     biba from home,  radiates into (L) clavicle.  increased pain with deep respirations.    • Head Ache     started yesterday on (L) side of head.        CODE STATUS  Full Code    HPI & HOSPITAL COURSE    Please see H&P dictated by Dr. Ruiz. This is a 57 y.o. female with history of history of liver transplant, on immunosuppressants, diabetes mellitus type 2,  hypertension here with septic shock with unknown source. Patient was started on empiric antibiotics. She also required pressor support and was weaned off and transferred out of ICU. Patient's blood cultures, CSF culture , urinalysis, influenza rapid were negative to date. Patient was seen by infectious disease (Dr. Villegas) and her antibiotics were de-escalated. Patient was discharge home with azithromycin for 4 days and is to follow up with her PCP in 1-2 weeks.     Therefore, she is discharged in good and stable condition with close outpatient follow-up.    SPECIFIC OUTPATIENT FOLLOW-UP  PCP in 1-2 weeks.     DISCHARGE PROBLEM LIST  Active Problems:    Status post liver transplant Dr. Canada ([facility]) POA: Yes      Overview: -December 2011: Status post liver transplant for end stage liver disease       at [facility], followed by Dr. Canada.          Sarcoidosis (CMS-HCC) POA: Yes    COPD (chronic obstructive pulmonary disease) (CMS-HCC) POA: Yes    Pulmonary hypertension (CMS-HCC) POA: Yes      Overview: Initial evaluation at [facility] pre liver transplant, PFO closed w/o       complication, 1/25/2011, then worse PAH and R HF post transplant,       extensive evaluations at [facility] in [place] with cath and drug trial, responding       to tadalafil and ambrisentan dramatically. Followed there with serial R       heart cath.      3/25/2014: Most recent R heart cath done Dr. Brenda Flores with mild       pulmonary hypertension and relatively high ouput      November 2014:  Echocardiogram with normal LV size, LVEF 60-65%. Mild MR,       mild AI, mild TR, RVSP 29-34mmHg.      February 2015: Echocardiogram with mild concentric LVH, LVEF 65-70%. Mild       MR, mild AI, trace TR, RVSP 16mmHg.    Immunocompromised state (CMS-HCC) POA: Yes    Thrombocytopenia (CMS-HCC) POA: Yes    IDDM (insulin dependent diabetes mellitus) (CMS-HCC) POA: Yes  Resolved Problems:    Septic shock (CMS-HCC) POA: Yes    Sepsis (CMS-HCC) POA: Unknown    Sepsis (CMS-HCC) POA: Unknown      FOLLOW UP  Future Appointments  Date Time Provider Department Center   7/3/2017 8:00 AM [facility] None   7/14/2017 10:00 AM Bernarda Reyes D.O. [facility] [place]   8/8/2017 10:00 AM Maxwell Phan M.D. [facility] None   [facility]2017 [facility]   8/21/2017 2:40 PM [facility] None   9/6/2017 8:00 AM [facility] None         MEDICATIONS ON DISCHARGE   Roxana Cuba   Home Medication Instructions MORELIA:[id number]    Printed on:06/26/17 1117   Medication Information                      alprazolam (XANAX) 0.5 MG Tab  Take 1 Tab by mouth 3 times a day.             ambrisentan (LETAIRIS) 10 MG tablet  Take 1 Tab by mouth every day.             ascorbic acid (ASCORBIC ACID) 500 MG Tab  Take 500 mg by mouth every day.             aspirin EC (ECOTRIN) 81 MG Tablet Delayed Response  Take 1 Tab by mouth every morning.             azithromycin (ZITHROMAX) 250 MG Tab  Take 1 Tab by mouth every day.             CITRACAL MAXIMUM 315-250 MG-UNIT Tab  TAKE 2 TABS BY MOUTH 2X/ DAY WITH FOOD BEFORE AND AFTER DINNER FOR LIFE-CRUSH 1ST 3 MONTH, SPACE MVI             cyclobenzaprine (FLEXERIL) 10 MG Tab  Take 10 mg by mouth 3 times a day as needed.             docusate sodium (COLACE) 100 MG Cap  Take 100 mg by mouth every day.             ferrous sulfate (FEOSOL) 220 (44 FE) MG/5ML Elixir  Take 5 mL by mouth every day.             fluticasone (FLONASE) 50 MCG/ACT nasal spray  Spray 1 Spray in nose every  day.             furosemide (LASIX) 40 MG Tab  Take 40 mg by mouth every morning.             gabapentin (NEURONTIN) 600 MG tablet  Take 1 Tab by mouth 3 times a day.             levothyroxine (SYNTHROID) 137 MCG Tab  Take 1 Tab by mouth every day.             Linaclotide 290 MCG Cap  Take 290 mg by mouth every day.             mycophenolate (CELLCEPT) 250 MG Cap  Take 500 mg by mouth 2 times a day.             omeprazole (PRILOSEC) 40 MG delayed-release capsule  Take 1 Cap by mouth every day.             ondansetron (ZOFRAN ODT) 4 MG TABLET DISPERSIBLE  Take 1 Tab by mouth every 8 hours as needed for Nausea/Vomiting. Nausea and vomiting             oxycodone immediate release (ROXICODONE) 10 MG immediate release tablet  Take 1-3 Tabs by mouth every 6 hours as needed for Moderate Pain.             pravastatin (PRAVACHOL) 20 MG Tab  Take 1 Tab by mouth every day.             predniSONE (DELTASONE) 5 MG Tab  Take 7 mg by mouth every morning.             Probiotic Product (PROBIOTIC DAILY PO)  Take 1 Cap by mouth every day.             sennosides (SENOKOT) 8.6 MG Tab  Take 17.2 mg by mouth 2 times a day.             sertraline (ZOLOFT) 100 MG Tab  Take 100 mg by mouth every bedtime.             tacrolimus (PROGRAF) 0.5 MG Cap  Take 1.5 mg by mouth 2 times a day.             Tadalafil, PAH, (ADCIRCA) 20 MG Tab  Take 40 mg by mouth every bedtime. Indications: Pulmonary Arterial Hypertension             tiotropium (SPIRIVA) 18 MCG Cap  Inhale 1 Cap by mouth every day.             trazodone (DESYREL) 50 MG Tab  Take 1-2 Tabs by mouth at bedtime as needed for Sleep.                 DIET  Orders Placed This Encounter   Procedures   • Diet Order     Standing Status: Standing      Number of Occurrences: 1      Standing Expiration Date:      Order Specific Question:  Diet:     Answer:  Diabetic [3]       ACTIVITY  As tolerated.        CONSULTATIONS  Dr. Villegas - Infectious Disease      PROCEDURES  Central Line Placement  IR  guided Lumbar Puncture    LABORATORY  Lab Results   Component Value Date/Time    SODIUM 142 06/24/2017 06:36 AM    POTASSIUM 4.3 06/24/2017 06:36 AM    CHLORIDE 111 06/24/2017 06:36 AM    CO2 25 06/24/2017 06:36 AM    GLUCOSE 255* 06/24/2017 06:36 AM    BUN 18 06/24/2017 06:36 AM    CREATININE 0.93 06/24/2017 06:36 AM        Lab Results   Component Value Date/Time    WBC 5.1 06/24/2017 06:36 AM    HEMOGLOBIN 11.0* 06/24/2017 06:36 AM    HEMATOCRIT 33.2* 06/24/2017 06:36 AM    PLATELET COUNT 68* 06/24/2017 06:36 AM        Total time of the discharge process exceeds 38 minutes

## 2017-06-27 ENCOUNTER — PATIENT OUTREACH (OUTPATIENT)
Dept: HEALTH INFORMATION MANAGEMENT | Facility: OTHER | Age: 57
End: 2017-06-27

## 2017-07-03 ENCOUNTER — HOSPITAL ENCOUNTER (OUTPATIENT)
Dept: LAB | Facility: MEDICAL CENTER | Age: 57
End: 2017-07-03
Attending: INTERNAL MEDICINE
Payer: MEDICARE

## 2017-07-03 LAB
ALBUMIN SERPL BCP-MCNC: 4 G/DL (ref 3.2–4.9)
ALBUMIN/GLOB SERPL: 1.6 G/DL
ALP SERPL-CCNC: 35 U/L (ref 30–99)
ALT SERPL-CCNC: 20 U/L (ref 2–50)
ANION GAP SERPL CALC-SCNC: 5 MMOL/L (ref 0–11.9)
AST SERPL-CCNC: 20 U/L (ref 12–45)
BASOPHILS # BLD AUTO: 1 % (ref 0–1.8)
BASOPHILS # BLD: 0.05 K/UL (ref 0–0.12)
BILIRUB SERPL-MCNC: 0.4 MG/DL (ref 0.1–1.5)
BUN SERPL-MCNC: 21 MG/DL (ref 8–22)
CALCIUM SERPL-MCNC: 9.4 MG/DL (ref 8.5–10.5)
CHLORIDE SERPL-SCNC: 101 MMOL/L (ref 96–112)
CO2 SERPL-SCNC: 34 MMOL/L (ref 20–33)
CREAT SERPL-MCNC: 1.09 MG/DL (ref 0.5–1.4)
EOSINOPHIL # BLD AUTO: 0.18 K/UL (ref 0–0.51)
EOSINOPHIL NFR BLD: 3.5 % (ref 0–6.9)
ERYTHROCYTE [DISTWIDTH] IN BLOOD BY AUTOMATED COUNT: 44 FL (ref 35.9–50)
GFR SERPL CREATININE-BSD FRML MDRD: 52 ML/MIN/1.73 M 2
GGT SERPL-CCNC: 10 U/L (ref 7–34)
GLOBULIN SER CALC-MCNC: 2.5 G/DL (ref 1.9–3.5)
GLUCOSE SERPL-MCNC: 136 MG/DL (ref 65–99)
HCT VFR BLD AUTO: 36.8 % (ref 37–47)
HGB BLD-MCNC: 12 G/DL (ref 12–16)
IMM GRANULOCYTES # BLD AUTO: 0.07 K/UL (ref 0–0.11)
IMM GRANULOCYTES NFR BLD AUTO: 1.4 % (ref 0–0.9)
LYMPHOCYTES # BLD AUTO: 1.21 K/UL (ref 1–4.8)
LYMPHOCYTES NFR BLD: 23.4 % (ref 22–41)
MAGNESIUM SERPL-MCNC: 2 MG/DL (ref 1.5–2.5)
MCH RBC QN AUTO: 29.1 PG (ref 27–33)
MCHC RBC AUTO-ENTMCNC: 32.6 G/DL (ref 33.6–35)
MCV RBC AUTO: 89.1 FL (ref 81.4–97.8)
MONOCYTES # BLD AUTO: 0.31 K/UL (ref 0–0.85)
MONOCYTES NFR BLD AUTO: 6 % (ref 0–13.4)
NEUTROPHILS # BLD AUTO: 3.35 K/UL (ref 2–7.15)
NEUTROPHILS NFR BLD: 64.7 % (ref 44–72)
NRBC # BLD AUTO: 0 K/UL
NRBC BLD AUTO-RTO: 0 /100 WBC
PLATELET # BLD AUTO: 86 K/UL (ref 164–446)
PMV BLD AUTO: 9.2 FL (ref 9–12.9)
POTASSIUM SERPL-SCNC: 4 MMOL/L (ref 3.6–5.5)
PROT SERPL-MCNC: 6.5 G/DL (ref 6–8.2)
RBC # BLD AUTO: 4.13 M/UL (ref 4.2–5.4)
SODIUM SERPL-SCNC: 140 MMOL/L (ref 135–145)
WBC # BLD AUTO: 5.2 K/UL (ref 4.8–10.8)

## 2017-07-03 PROCEDURE — 80053 COMPREHEN METABOLIC PANEL: CPT

## 2017-07-03 PROCEDURE — 82977 ASSAY OF GGT: CPT

## 2017-07-03 PROCEDURE — 83735 ASSAY OF MAGNESIUM: CPT

## 2017-07-03 PROCEDURE — 36415 COLL VENOUS BLD VENIPUNCTURE: CPT

## 2017-07-03 PROCEDURE — 80197 ASSAY OF TACROLIMUS: CPT

## 2017-07-03 PROCEDURE — 85025 COMPLETE CBC W/AUTO DIFF WBC: CPT

## 2017-07-06 ENCOUNTER — APPOINTMENT (OUTPATIENT)
Dept: RADIOLOGY | Facility: MEDICAL CENTER | Age: 57
End: 2017-07-06
Attending: EMERGENCY MEDICINE
Payer: MEDICARE

## 2017-07-06 ENCOUNTER — HOSPITAL ENCOUNTER (EMERGENCY)
Facility: MEDICAL CENTER | Age: 57
End: 2017-07-06
Attending: EMERGENCY MEDICINE
Payer: MEDICARE

## 2017-07-06 VITALS
BODY MASS INDEX: 25.76 KG/M2 | WEIGHT: 169.97 LBS | HEART RATE: 63 BPM | SYSTOLIC BLOOD PRESSURE: 115 MMHG | RESPIRATION RATE: 16 BRPM | HEIGHT: 68 IN | OXYGEN SATURATION: 97 % | TEMPERATURE: 99.2 F | DIASTOLIC BLOOD PRESSURE: 59 MMHG

## 2017-07-06 DIAGNOSIS — F11.90 NARCOTIC DRUG USE: ICD-10-CM

## 2017-07-06 DIAGNOSIS — R07.89 CHEST WALL PAIN: ICD-10-CM

## 2017-07-06 LAB — TACROLIMUS BLD-MCNC: 2 NG/ML

## 2017-07-06 PROCEDURE — 71101 X-RAY EXAM UNILAT RIBS/CHEST: CPT | Mod: LT

## 2017-07-06 PROCEDURE — 304561 HCHG STAT O2

## 2017-07-06 PROCEDURE — 99284 EMERGENCY DEPT VISIT MOD MDM: CPT

## 2017-07-06 PROCEDURE — 96372 THER/PROPH/DIAG INJ SC/IM: CPT

## 2017-07-06 PROCEDURE — 700111 HCHG RX REV CODE 636 W/ 250 OVERRIDE (IP): Performed by: EMERGENCY MEDICINE

## 2017-07-06 RX ORDER — MORPHINE SULFATE 10 MG/ML
5 INJECTION, SOLUTION INTRAMUSCULAR; INTRAVENOUS ONCE
Status: COMPLETED | OUTPATIENT
Start: 2017-07-06 | End: 2017-07-06

## 2017-07-06 RX ORDER — ONDANSETRON 4 MG/1
4 TABLET, ORALLY DISINTEGRATING ORAL ONCE
Status: COMPLETED | OUTPATIENT
Start: 2017-07-06 | End: 2017-07-06

## 2017-07-06 RX ADMIN — ONDANSETRON 4 MG: 4 TABLET, ORALLY DISINTEGRATING ORAL at 21:01

## 2017-07-06 RX ADMIN — MORPHINE SULFATE 5 MG: 10 INJECTION INTRAVENOUS at 21:01

## 2017-07-06 ASSESSMENT — PAIN SCALES - GENERAL
PAINLEVEL_OUTOF10: 7
PAINLEVEL_OUTOF10: 10

## 2017-07-06 NOTE — ED AVS SNAPSHOT
7/6/2017    Roxana Cuba  [address]  [place] [address]    Dear Roxana:    [facility] wants to ensure your discharge home is safe and you or your loved ones have had all of your questions answered regarding your care after you leave the hospital.    Below is a list of resources and contact information should you have any questions regarding your hospital stay, follow-up instructions, or active medical symptoms.    Questions or Concerns Regarding… Contact   Medical Questions Related to Your Discharge  (7 days a week, 8am-5pm) Contact a Nurse Care Coordinator   [phone]   Medical Questions Not Related to Your Discharge  (24 hours a day / 7 days a week)  Contact the Nurse Health Line   [phone]    Medications or Discharge Instructions Refer to your discharge packet   or contact your [facility] Primary Care Provider   [phone]   Follow-up Appointment(s) Schedule your appointment via O[employer]   or contact Scheduling [phone]   Billing Review your statement via [employer]  or contact Billing [phone]   Medical Records Review your records via Ometrics   or contact Medical Records [phone]     You may receive a telephone call within two days of discharge. This call is to make certain you understand your discharge instructions and have the opportunity to have any questions answered. You can also easily access your medical information, test results and upcoming appointments via the [employer] free online health management tool. You can learn more and sign up at Langtice/[employer]. For assistance setting up your [employer] account, please call [phone].    Once again, we want to ensure your discharge home is safe and that you have a clear understanding of any next steps in your care. If you have any questions or concerns, please do not hesitate to contact us, we are here for you. Thank you for choosing [facility] for your healthcare needs.    Sincerely,    Your [facility] Healthcare Team

## 2017-07-06 NOTE — ED AVS SNAPSHOT
The Mult[employer] Network Access Code: Activation code not generated  Current The Mult[employer] Network Status: Active    [facility]  A secure, online tool to manage your health information     [employer]’s The Multiverse Network® is a secure, online tool that connects you to your personalized health information from the privacy of your home -- day or night - making it very easy for you to manage your healthcare. Once the activation process is completed, you can even access your medical information using the The Mult[employer] Network kacy, which is available for free in the Apple Kacy store or Google Play store.     The Multiverse Network provides the following levels of access (as shown below):   My Chart Features   [facility] Primary Care Doctor [facility]  Specialists [facility]  Urgent  Care Non-[facility]  Primary Care  Doctor   Email your healthcare team securely and privately 24/7 X X X X   Manage appointments: schedule your next appointment; view details of past/upcoming appointments X      Request prescription refills. X      View recent personal medical records, including lab and immunizations X X X X   View health record, including health history, allergies, medications X X X X   Read reports about your outpatient visits, procedures, consult and ER notes X X X X   See your discharge summary, which is a recap of your hospital and/or ER visit that includes your diagnosis, lab results, and care plan. X X       How to register for The Multiverse Network:  1. Go to  [email or web address].  2. Click on the Sign Up Now box, which takes you to the New Member Sign Up page. You will need to provide the following information:  a. Enter your The Mult[employer] Network Access Code exactly as it appears at the top of this page. (You will not need to use this code after you’ve completed the sign-up process. If you do not sign up before the expiration date, you must request a new code.)   b. Enter your date of birth.   c. Enter your home email address.   d. Click Submit, and follow the next screen’s instructions.  3. Create a The Mult[employer] Network ID. This will  be your [employer] login ID and cannot be changed, so think of one that is secure and easy to remember.  4. Create a [employer] password. You can change your password at any time.  5. Enter your Password Reset Question and Answer. This can be used at a later time if you forget your password.   6. Enter your e-mail address. This allows you to receive e-mail notifications when new information is available in [employer].  7. Click Sign Up. You can now view your health information.    For assistance activating your [employer] account, call [phone]

## 2017-07-06 NOTE — ED AVS SNAPSHOT
Home Care Instructions                                                                                                                Roxana Cuba   MRN: [id number]    Department:  [facility], Emergency Dept   Date of Visit:  7/6/2017            [facility], Emergency Dept    [address] [address]    Phone:  [phone]      You were seen by     Annamarie Aguilar M.D.      Your Diagnosis Was     Chest wall pain     R07.89       These are the medications you received during your hospitalization from 07/06/2017 1908 to 07/06/2017 2158     Date/Time Order Dose Route Action    07/06/2017 2101 morphine (pf) 10 mg/ml injection 5 mg 5 mg Intramuscular Given    07/06/2017 2101 ondansetron (ZOFRAN ODT) dispertab 4 mg 4 mg Oral Given      Follow-up Information     1. Follow up with Bernarda Reyes D.O.. Schedule an appointment as soon as possible for a visit in 1 day.    Specialty:  Family Medicine    Contact information    [address]  [address]  [place] [address] [phone]        Medication Information     Review all of your home medications and newly ordered medications with your primary doctor and/or pharmacist as soon as possible. Follow medication instructions as directed by your doctor and/or pharmacist.     Please keep your complete medication list with you and share with your physician. Update the information when medications are discontinued, doses are changed, or new medications (including over-the-counter products) are added; and carry medication information at all times in the event of emergency situations.               Medication List      ASK your doctor about these medications        Instructions    Morning Afternoon Evening Bedtime    ADCIRCA 20 MG Tabs   Generic drug:  Tadalafil (PAH)        Take 40 mg by mouth every bedtime. Indications: Pulmonary Arterial Hypertension   Dose:  40 mg                        alprazolam  0.5 MG Tabs   Commonly known as:  XANAX        Doctor's comments:  Refill after 5/4/17   Take 1 Tab by mouth 3 times a day.   Dose:  0.5 mg                        ambrisentan 10 MG tablet   Commonly known as:  LETAIRIS        Take 1 Tab by mouth every day.   Dose:  10 mg                        ascorbic acid 500 MG Tabs   Commonly known as:  ascorbic acid        Take 500 mg by mouth every day.   Dose:  500 mg                        aspirin EC 81 MG Tbec   Commonly known as:  ECOTRIN        Take 1 Tab by mouth every morning.   Dose:  81 mg                        azithromycin 250 MG Tabs   Commonly known as:  ZITHROMAX        Take 1 Tab by mouth every day.   Dose:  250 mg                        CELLCEPT 250 MG Caps   Generic drug:  mycophenolate        Take 500 mg by mouth 2 times a day.   Dose:  500 mg                        CITRACAL MAXIMUM 315-250 MG-UNIT Tabs   Generic drug:  Calcium Citrate-Vitamin D        TAKE 2 TABS BY MOUTH 2X/ DAY WITH FOOD BEFORE AND AFTER DINNER FOR LIFE-CRUSH 1ST 3 MONTH, SPACE MVI                        cyclobenzaprine 10 MG Tabs   Commonly known as:  FLEXERIL        Take 10 mg by mouth 3 times a day as needed.   Dose:  10 mg                        docusate sodium 100 MG Caps   Commonly known as:  COLACE        Take 100 mg by mouth every day.   Dose:  100 mg                        ferrous sulfate 220 (44 FE) MG/5ML Elix   Commonly known as:  FEOSOL        Take 5 mL by mouth every day.   Dose:  220 mg                        fluticasone 50 MCG/ACT nasal spray   Commonly known as:  FLONASE        Spray 1 Spray in nose every day.   Dose:  1 Spray                        furosemide 40 MG Tabs   Commonly known as:  LASIX        TAKE 1 TAB BY MOUTH EVERY DAY.                        gabapentin 600 MG tablet   Commonly known as:  NEURONTIN        Doctor's comments:  Authorization of a refill request.   Take 1 Tab by mouth 3 times a day.   Dose:  600 mg                        levothyroxine 137 MCG  Tabs   Commonly known as:  SYNTHROID        Take 1 Tab by mouth every day.   Dose:  137 mcg                        Linaclotide 290 MCG Caps        Take 290 mg by mouth every day.   Dose:  290 mg                        omeprazole 40 MG delayed-release capsule   Commonly known as:  PRILOSEC        Take 1 Cap by mouth every day.   Dose:  40 mg                        ondansetron 4 MG Tbdp   Last time this was given:  4 mg on 7/6/2017  9:01 PM   Commonly known as:  ZOFRAN ODT        Take 1 Tab by mouth every 8 hours as needed for Nausea/Vomiting. Nausea and vomiting   Dose:  4 mg                        oxycodone immediate release 10 MG immediate release tablet   Commonly known as:  ROXICODONE        Doctor's comments:  Prescription #3 refill after 6/22/17   Take 1-3 Tabs by mouth every 6 hours as needed for Moderate Pain.   Dose:  10-30 mg                        pravastatin 20 MG Tabs   Commonly known as:  PRAVACHOL        Take 1 Tab by mouth every day.   Dose:  20 mg                        predniSONE 5 MG Tabs   Commonly known as:  DELTASONE        Take 7 mg by mouth every morning.   Dose:  7 mg                        PROBIOTIC DAILY PO        Take 1 Cap by mouth every day.   Dose:  1 Cap                        sennosides 8.6 MG Tabs   Commonly known as:  SENOKOT        Take 17.2 mg by mouth 2 times a day.   Dose:  17.2 mg                        sertraline 100 MG Tabs   Commonly known as:  ZOLOFT        Take 100 mg by mouth every bedtime.   Dose:  100 mg                        tacrolimus 0.5 MG Caps   Commonly known as:  PROGRAF        Take 1.5 mg by mouth 2 times a day.   Dose:  1.5 mg                        tiotropium 18 MCG Caps   Commonly known as:  SPIRIVA        Inhale 1 Cap by mouth every day.   Dose:  18 mcg                        trazodone 50 MG Tabs   Commonly known as:  DESYREL        Take 1-2 Tabs by mouth at bedtime as needed for Sleep.   Dose:   mg                                Procedures and tests  performed during your visit     [id number] (WITH 1-VIEW CXR) LEFT        Discharge Instructions       Chest Wall Pain  Chest wall pain is pain felt in or around the chest bones and muscles. It may take up to 6 weeks to get better. It may take longer if you are active. Chest wall pain can happen on its own. Other times, things like germs, injury, coughing, or exercise can cause the pain.  HOME CARE   · Avoid activities that make you tired or cause pain. Try not to use your chest, belly (abdominal), or side muscles. Do not use heavy weights.  · Put ice on the sore area.  ¨ Put ice in a plastic bag.  ¨ Place a towel between your skin and the bag.  ¨ Leave the ice on for 15-20 minutes for the first 2 days.  · Only take medicine as told by your doctor.  GET HELP RIGHT AWAY IF:   · You have more pain or are very uncomfortable.  · You have a fever.  · Your chest pain gets worse.  · You have new problems.  · You feel sick to your stomach (nauseous) or throw up (vomit).  · You start to sweat or feel lightheaded.  · You have a cough with mucus (phlegm).  · You cough up blood.  MAKE SURE YOU:   · Understand these instructions.  · Will watch your condition.  · Will get help right away if you are not doing well or get worse.     This information is not intended to replace advice given to you by your health care provider. Make sure you discuss any questions you have with your health care provider.     Document Released: 06/05/2009 Document Revised: 03/11/2013 Document Reviewed: 03/14/2016  Elsevier Interactive Patient Education ©2016 [employer]            Patient Information     Patient Information    Following emergency treatment: all patient requiring follow-up care must return either to a private physician or a clinic if your condition worsens before you are able to obtain further medical attention, please return to the emergency room.     Billing Information    At [facility], we work to make the billing process  streamlined for our patients.  Our Representatives are here to answer any questions you may have regarding your hospital bill.  If you have insurance coverage and have supplied your insurance information to us, we will submit a claim to your insurer on your behalf.  Should you have any questions regarding your bill, we can be reached online or by phone as follows:  Online: You are able pay your bills online or live chat with our representatives about any billing questions you may have. We are here to help Monday - Friday from 8:00am to 7:30pm and 9:00am - 12:00pm on Saturdays.  Please visit [email or web address]  for more information.   Phone:  [phone] or [phone]    Please note that your emergency physician, surgeon, pathologist, radiologist, anesthesiologist, and other specialists are not employed by [facility] and will therefore bill separately for their services.  Please contact them directly for any questions concerning their bills at the numbers below:     Emergency Physician Services:  [phone]  [facility] [facility]:  [phone]  [facility]:  [phone]  [facility]:  [phone]    1. Your final bill may vary from the amount quoted upon discharge if all procedures are not complete at that time, or if your doctor has additional procedures of which we are not aware. You will receive an additional bill if you return to the Emergency Department at [facility] for suture removal regardless of the facility of which the sutures were placed.     2. Please arrange for settlement of this account at the emergency registration.    3. All self-pay accounts are due in full at the time of treatment.  If you are unable to meet this obligation then payment is expected within 4-5 days.     4. If you have had radiology studies (CT, X-ray, Ultrasound, MRI), you have received a preliminary result during your emergency department visit.  Please contact the radiology department [phone] to receive a copy of your final result. Please discuss the Final result with your primary physician or with the follow up physician provided.     Crisis Hotline:  [place] Crisis Hotline:  9-928-VYRULZT or 1-698.732.7339  [place] Crisis Hotline:    1-522.548.8816 or 593-673-7821         ED Discharge Follow Up Questions    1. In order to provide you with very good care, we would like to follow up with a phone call in the next few days.  May we have your permission to contact you?     YES /  NO    2. What is the best phone number to call you? (       )_____-__________    3. What is the best time to call you?      Morning  /  Afternoon  /  Evening                   Patient Signature:  ____________________________________________________________    Date:  ____________________________________________________________      Your appointments     Jul 14, 2017 10:00 AM   Established Patient with Bernarda Reyes D.O.   [facility])    [address] [place] [address], [address]  [place] [id number] [phone]           You will be receiving a confirmation call a few days before your appointment from our automated call confirmation system.            Aug 08, 2017 10:00 AM   FOLLOW UP with Maxwell Phan M.D.   [facility] (--)    [address]  [place] [id number] [phone]            Aug 09, 2017  8:00 AM   Adult Draw/Collection with [facility]   [facility] (--)    [address] [place] [address]   [phone]            Aug 21, 2017  2:40 PM   Established Patient Pul with A Rotation   [facility] (--)    [address]  [address]  [place] [address]   [phone]            Sep 06, 2017  8:00 AM   Adult Draw/Collection with [facility]   [facility] (--)    [address] [place] [address]   [phone]            Oct 11, 2017   8:00 AM   Adult Draw/Collection with [facility]   [facility] (--)    [address] [place] [address]   [phone]            Nov 08, 2017  8:00 AM   Adult Draw/Collection with [facility]   [facility] (--)    [address] [place] [address]   [phone]            Dec 06, 2017  8:00 AM   Adult Draw/Collection with [facility]   [facility] (--)    [address] [place] [address]   [phone]

## 2017-07-07 NOTE — ED NOTES
Pt hospitalized in ICU approx 1 week ago for pneumonia. Has finished antibiotics, reports continued SOB as well as diarrhea. Pt believes pneumonia persists. LS clear in all fields.  Was playing with grandson and fell on L side onto blocks, c/o L lateral breast and rib pain. No obvious edema, ecchymosis, deformity.   Reports blistering rash to L sided suprapubic area.   Pt wears 4-6 L nasal o2 all of the time.

## 2017-07-07 NOTE — ED NOTES
Grandson knocked her down onto some of his blocks, landed on her left side and is now in excruciating pain she stated. Yesterday this occurred.

## 2017-07-07 NOTE — ED PROVIDER NOTES
ED Provider Note    CHIEF COMPLAINT  Chief Complaint   Patient presents with   • Rib Injury       HPI  Roxana Cuba is a 57 y.o. female who is immunocompromised secondary to a liver transplant 2011 presents to the emergency department with a decent diagnosis of pneumonia, she is on chronic oxygen at home she has been home after being hospitalized and states she was playing with her grandson who landed on her left chest and she fell into the blocks. She is complaining of. Pain and a feeling like something is poking into her lungs. She is concerned about pneumothorax. She also states she has a recurrence of left suprapubic shingles.     REVIEW OF SYSTEMS  Positive for chest wall pain, shortness of breath, Negative for fever  PAST MEDICAL HISTORY   has a past medical history of Cirrhosis (CMS-HCC) (December 2011); S/P cholecystectomy; GERD (gastroesophageal reflux disease); Psychiatric disorder; Fracture of left foot; Bronchitis ( ); CKD (chronic kidney disease) stage 3, GFR 30-59 ml/min; Breath shortness; Chronic fatigue and malaise; VRE (vancomycin-resistant Enterococci); Low back pain (02-17-17); Pneumonia; Mild aortic regurgitation and aortic valve sclerosis ( ); Splenomegaly; Small bowel obstruction (CMS-HCC); On home oxygen therapy; Pulmonary hypertension (CMS-HCC); Diabetes (CMS-HCC); Hypothyroid; H/O Clostridium difficile infection (02-17-17); and Platelet disorder (CMS-HCC).    SOCIAL HISTORY  Social History     Social History Main Topics   • Smoking status: Passive Smoke Exposure - Never Smoker   • Smokeless tobacco: Never Used      Comment: avoid all tobacco products   • Alcohol Use: No      Comment: 05/2009 quit drinking   • Drug Use: No   • Sexual Activity: Not on file       SURGICAL HISTORY   has past surgical history that includes anesth,nose,sinus surgery; makayla by laparoscopy (9/19/2009); exam under anesthesia (11/11/2009); hysterectomy, total abdominal; other; gastroscopy-endo (3/12/2010);  bronchoscopy-endo (5/29/2012); bronchoscopy-endo (6/19/2012); sigmoidoscopy flex (9/26/2012); recovery (2/27/2013); bronchoscopy-endo (11/15/2013); recovery (1/21/2014); recovery (3/24/2014); hemorrhoidectomy (11/11/2009); gastroscopy (9/28/2012); carpal tunnel release; bone marrow aspiration (12/31/2012); bone marrow biopsy, ndl/trocar (12/31/2012); recovery (3/31/2014); other abdominal surgery (December 2011); bone marrow aspiration (3/16/2015); bone marrow biopsy, ndl/trocar (3/16/2015); lung biopsy open (11/2016); tonsillectomy; other abdominal surgery (); breast biopsy (Left, 2/21/2017); colonoscopy (N/A, 3/27/2017); and gastroscopy (N/A, 3/27/2017).    CURRENT MEDICATIONS  Reviewed.  See Encounter Summary.  Include No current facility-administered medications for this encounter.    Current outpatient prescriptions:   •  furosemide (LASIX) 40 MG Tab, TAKE 1 TAB BY MOUTH EVERY DAY., Disp: 30 Tab, Rfl: 6  •  azithromycin (ZITHROMAX) 250 MG Tab, Take 1 Tab by mouth every day., Disp: 4 Tab, Rfl: 0  •  cyclobenzaprine (FLEXERIL) 10 MG Tab, Take 10 mg by mouth 3 times a day as needed., Disp: , Rfl:   •  ferrous sulfate (FEOSOL) 220 (44 FE) MG/5ML Elixir, Take 5 mL by mouth every day., Disp: 1 Bottle, Rfl: 10  •  oxycodone immediate release (ROXICODONE) 10 MG immediate release tablet, Take 1-3 Tabs by mouth every 6 hours as needed for Moderate Pain., Disp: 90 Tab, Rfl: 0  •  alprazolam (XANAX) 0.5 MG Tab, Take 1 Tab by mouth 3 times a day., Disp: 90 Tab, Rfl: 2  •  Linaclotide 290 MCG Cap, Take 290 mg by mouth every day., Disp: 90 Cap, Rfl: 3  •  CITRACAL MAXIMUM 315-250 MG-UNIT Tab, TAKE 2 TABS BY MOUTH 2X/ DAY WITH FOOD BEFORE AND AFTER DINNER FOR LIFE-CRUSH 1ST 3 MONTH, SPACE MVI, Disp: 120 Tab, Rfl: 11  •  tacrolimus (PROGRAF) 0.5 MG Cap, Take 1.5 mg by mouth 2 times a day., Disp: , Rfl:   •  ascorbic acid (ASCORBIC ACID) 500 MG Tab, Take 500 mg by mouth every day., Disp: , Rfl:   •  sennosides (SENOKOT)  8.6 MG Tab, Take 17.2 mg by mouth 2 times a day., Disp: , Rfl:   •  Probiotic Product (PROBIOTIC DAILY PO), Take 1 Cap by mouth every day., Disp: , Rfl:   •  aspirin EC (ECOTRIN) 81 MG Tablet Delayed Response, Take 1 Tab by mouth every morning., Disp: 90 Tab, Rfl: 4  •  gabapentin (NEURONTIN) 600 MG tablet, Take 1 Tab by mouth 3 times a day., Disp: 270 Tab, Rfl: 4  •  levothyroxine (SYNTHROID) 137 MCG Tab, Take 1 Tab by mouth every day., Disp: 90 Tab, Rfl: 4  •  omeprazole (PRILOSEC) 40 MG delayed-release capsule, Take 1 Cap by mouth every day., Disp: 90 Cap, Rfl: 4  •  ondansetron (ZOFRAN ODT) 4 MG TABLET DISPERSIBLE, Take 1 Tab by mouth every 8 hours as needed for Nausea/Vomiting. Nausea and vomiting, Disp: 270 Tab, Rfl: 4  •  trazodone (DESYREL) 50 MG Tab, Take 1-2 Tabs by mouth at bedtime as needed for Sleep., Disp: 90 Tab, Rfl: 4  •  tiotropium (SPIRIVA) 18 MCG Cap, Inhale 1 Cap by mouth every day., Disp: 90 Cap, Rfl: 4  •  fluticasone (FLONASE) 50 MCG/ACT nasal spray, Spray 1 Spray in nose every day., Disp: 16 g, Rfl: 10  •  predniSONE (DELTASONE) 5 MG Tab, Take 7 mg by mouth every morning., Disp: , Rfl:   •  sertraline (ZOLOFT) 100 MG Tab, Take 100 mg by mouth every bedtime., Disp: , Rfl:   •  ambrisentan (LETAIRIS) 10 MG tablet, Take 1 Tab by mouth every day., Disp: 30 Tab, Rfl: 11  •  docusate sodium (COLACE) 100 MG Cap, Take 100 mg by mouth every day., Disp: , Rfl:   •  mycophenolate (CELLCEPT) 250 MG Cap, Take 500 mg by mouth 2 times a day., Disp: , Rfl:   •  Tadalafil, PAH, (ADCIRCA) 20 MG Tab, Take 40 mg by mouth every bedtime. Indications: Pulmonary Arterial Hypertension, Disp: , Rfl:   •  pravastatin (PRAVACHOL) 20 MG Tab, Take 1 Tab by mouth every day., Disp: 30 Tab, Rfl: 11      ALLERGIES  Allergies   Allergen Reactions   • Rhubarb Anaphylaxis   • Vancomycin Hcl      Causes red man syndrome when infused to fast         PHYSICAL EXAM  VITAL SIGNS: /59 mmHg  Pulse 60  Temp(Src) 37.3 °C (99.2  "°F)  Resp 16  Ht 1.727 m (5' 8\")  Wt 77.1 kg (169 lb 15.6 oz)  BMI 25.85 kg/m2  SpO2 93%  LMP 01/03/2000  Constitutional: Pleasant, Alert in no apparent distress.  HENT: Normocephalic, Bilateral external ears normal. Nose normal.   Eyes: Pupils are equal and reactive. Conjunctiva normal, non-icteric.   Thorax & Lungs: Easy unlabored respirations, left-sided chest wall tenderness no crepitus ecchymosis no rash no bruising  Abdomen:  No gross signs of peritonitis, no pain with movement   Skin: Visualized skin is  Dry, No erythema, excoriated region on her left suprapubic area  Extremities:   No edema, No asymmetry  Neurologic: Alert, Grossly non-focal.   Psychiatric: Affect and Mood normal      COURSE & MEDICAL DECISION MAKING  Nursing notes and vital signs were reviewed. (See chart for details)    The patient presents to the Emergency Department with chest wall pain after her grandson landed on her ribs x-ray rib series was obtained to rule out displaced fracture, pneumothorax or other abnormality    [id number] (WITH 1-VIEW CXR) LEFT   Final Result      Normal rib series.        X-ray shows normal rib series there is no signs of pneumonia he milk or pneumothorax. The patient was given 5 mg of morphine IM and Zofran she is complaining of continued pain I've asked that she follow-up with her primary care physician as she is already on a pain contract with chronic pain management          FINAL IMPRESSION  1. Chest wall pain  2.   3.             Electronically signed by: Annamarie Aguilar, 7/6/2017 9:53 PM        "

## 2017-07-07 NOTE — DISCHARGE INSTRUCTIONS
Chest Wall Pain  Chest wall pain is pain felt in or around the chest bones and muscles. It may take up to 6 weeks to get better. It may take longer if you are active. Chest wall pain can happen on its own. Other times, things like germs, injury, coughing, or exercise can cause the pain.  HOME CARE   · Avoid activities that make you tired or cause pain. Try not to use your chest, belly (abdominal), or side muscles. Do not use heavy weights.  · Put ice on the sore area.  ¨ Put ice in a plastic bag.  ¨ Place a towel between your skin and the bag.  ¨ Leave the ice on for 15-20 minutes for the first 2 days.  · Only take medicine as told by your doctor.  GET HELP RIGHT AWAY IF:   · You have more pain or are very uncomfortable.  · You have a fever.  · Your chest pain gets worse.  · You have new problems.  · You feel sick to your stomach (nauseous) or throw up (vomit).  · You start to sweat or feel lightheaded.  · You have a cough with mucus (phlegm).  · You cough up blood.  MAKE SURE YOU:   · Understand these instructions.  · Will watch your condition.  · Will get help right away if you are not doing well or get worse.     This information is not intended to replace advice given to you by your health care provider. Make sure you discuss any questions you have with your health care provider.     Document Released: 06/05/2009 Document Revised: 03/11/2013 Document Reviewed: 03/14/2016  [employer] Patient Education ©2016 [employer]

## 2017-07-07 NOTE — ED NOTES
Patient verbalized understanding of discharge instructions including order not to drive or drink alcohol for 6-12 hrs following narcotic use, no questions at this time. VS stable, patient will ambulate to exit with d/c instructions and rx in hand.

## 2017-07-10 ENCOUNTER — TELEPHONE (OUTPATIENT)
Dept: MEDICAL GROUP | Facility: PHYSICIAN GROUP | Age: 57
End: 2017-07-10

## 2017-07-10 DIAGNOSIS — Z98.84 HISTORY OF GASTRIC BYPASS: ICD-10-CM

## 2017-07-10 DIAGNOSIS — D86.9 SARCOIDOSIS: ICD-10-CM

## 2017-07-10 DIAGNOSIS — Z94.4 STATUS POST LIVER TRANSPLANT (HCC): ICD-10-CM

## 2017-07-10 NOTE — TELEPHONE ENCOUNTER
Pt called stating that insurance has canceled original referral to [facility], pt is requesting that we send it again with the message stating that this referral is replacing gamal referral that was approved and that she has been seen already through [facility] and that they are continuing care for her future procedures.    Reasoning to going to [facility] is because this is the office that originally did her procedure. They are requesting that pt comes back to have the liver transplant done at [facility].     [facility] for Dr Michelle Barfield

## 2017-07-14 ENCOUNTER — HOSPITAL ENCOUNTER (OUTPATIENT)
Facility: MEDICAL CENTER | Age: 57
End: 2017-07-14
Attending: FAMILY MEDICINE
Payer: MEDICARE

## 2017-07-14 ENCOUNTER — OFFICE VISIT (OUTPATIENT)
Dept: MEDICAL GROUP | Facility: PHYSICIAN GROUP | Age: 57
End: 2017-07-14
Payer: MEDICARE

## 2017-07-14 ENCOUNTER — APPOINTMENT (OUTPATIENT)
Dept: RADIOLOGY | Facility: IMAGING CENTER | Age: 57
End: 2017-07-14
Attending: FAMILY MEDICINE
Payer: MEDICARE

## 2017-07-14 ENCOUNTER — TELEPHONE (OUTPATIENT)
Dept: MEDICAL GROUP | Facility: PHYSICIAN GROUP | Age: 57
End: 2017-07-14

## 2017-07-14 VITALS
HEART RATE: 69 BPM | SYSTOLIC BLOOD PRESSURE: 120 MMHG | RESPIRATION RATE: 16 BRPM | TEMPERATURE: 98.6 F | BODY MASS INDEX: 26.07 KG/M2 | WEIGHT: 172 LBS | HEIGHT: 68 IN | DIASTOLIC BLOOD PRESSURE: 64 MMHG | OXYGEN SATURATION: 95 %

## 2017-07-14 DIAGNOSIS — L30.4 INTERTRIGINOUS DERMATITIS ASSOCIATED WITH MOISTURE: ICD-10-CM

## 2017-07-14 DIAGNOSIS — D84.9 IMMUNOCOMPROMISED STATE (HCC): ICD-10-CM

## 2017-07-14 DIAGNOSIS — R07.81 RIB PAIN ON LEFT SIDE: ICD-10-CM

## 2017-07-14 DIAGNOSIS — B37.0 ORAL THRUSH: ICD-10-CM

## 2017-07-14 DIAGNOSIS — B02.9 HERPES ZOSTER WITHOUT COMPLICATION: ICD-10-CM

## 2017-07-14 DIAGNOSIS — D50.9 IRON DEFICIENCY ANEMIA, UNSPECIFIED IRON DEFICIENCY ANEMIA TYPE: ICD-10-CM

## 2017-07-14 DIAGNOSIS — N18.30 CKD (CHRONIC KIDNEY DISEASE) STAGE 3, GFR 30-59 ML/MIN (HCC): ICD-10-CM

## 2017-07-14 DIAGNOSIS — Z98.84 HX OF GASTRIC BYPASS: ICD-10-CM

## 2017-07-14 DIAGNOSIS — G89.29 CHRONIC BILATERAL THORACIC BACK PAIN: ICD-10-CM

## 2017-07-14 DIAGNOSIS — Z23 NEED FOR VACCINATION: ICD-10-CM

## 2017-07-14 DIAGNOSIS — J44.9 CHRONIC OBSTRUCTIVE PULMONARY DISEASE, UNSPECIFIED COPD TYPE (HCC): ICD-10-CM

## 2017-07-14 DIAGNOSIS — Z87.19 HISTORY OF PANCREATITIS: ICD-10-CM

## 2017-07-14 DIAGNOSIS — F33.41 RECURRENT MAJOR DEPRESSIVE DISORDER, IN PARTIAL REMISSION (HCC): ICD-10-CM

## 2017-07-14 DIAGNOSIS — E03.9 HYPOTHYROIDISM, ADULT: ICD-10-CM

## 2017-07-14 DIAGNOSIS — M54.6 CHRONIC BILATERAL THORACIC BACK PAIN: ICD-10-CM

## 2017-07-14 DIAGNOSIS — Z79.899 CHRONIC PRESCRIPTION BENZODIAZEPINE USE: ICD-10-CM

## 2017-07-14 DIAGNOSIS — Z94.4 STATUS POST LIVER TRANSPLANT (HCC): ICD-10-CM

## 2017-07-14 DIAGNOSIS — D86.9 SARCOIDOSIS: ICD-10-CM

## 2017-07-14 DIAGNOSIS — Z79.891 CHRONIC USE OF OPIATE DRUGS THERAPEUTIC PURPOSES: ICD-10-CM

## 2017-07-14 DIAGNOSIS — J96.11 CHRONIC RESPIRATORY FAILURE WITH HYPOXIA (HCC): ICD-10-CM

## 2017-07-14 DIAGNOSIS — K56.609 SBO (SMALL BOWEL OBSTRUCTION) (HCC): ICD-10-CM

## 2017-07-14 DIAGNOSIS — E11.9 TYPE 2 DIABETES MELLITUS WITHOUT COMPLICATION, WITHOUT LONG-TERM CURRENT USE OF INSULIN (HCC): ICD-10-CM

## 2017-07-14 DIAGNOSIS — I27.20 PULMONARY HYPERTENSION (HCC): ICD-10-CM

## 2017-07-14 PROBLEM — A04.72 C. DIFFICILE DIARRHEA: Status: ACTIVE | Noted: 2017-07-14

## 2017-07-14 PROBLEM — K59.03 DRUG-INDUCED CONSTIPATION: Status: RESOLVED | Noted: 2017-04-03 | Resolved: 2017-07-14

## 2017-07-14 PROBLEM — Z16.21 VRE (VANCOMYCIN-RESISTANT ENTEROCOCCI): Status: ACTIVE | Noted: 2017-07-14

## 2017-07-14 PROBLEM — J18.9 CAP (COMMUNITY ACQUIRED PNEUMONIA): Status: RESOLVED | Noted: 2017-03-21 | Resolved: 2017-07-14

## 2017-07-14 PROBLEM — A49.1 VRE (VANCOMYCIN-RESISTANT ENTEROCOCCI): Status: ACTIVE | Noted: 2017-07-14

## 2017-07-14 PROBLEM — F32.A DEPRESSION: Status: ACTIVE | Noted: 2017-07-14

## 2017-07-14 PROCEDURE — 71111 X-RAY EXAM RIBS/CHEST4/> VWS: CPT | Mod: TC | Performed by: FAMILY MEDICINE

## 2017-07-14 PROCEDURE — 99215 OFFICE O/P EST HI 40 MIN: CPT | Mod: 25 | Performed by: FAMILY MEDICINE

## 2017-07-14 PROCEDURE — 87070 CULTURE OTHR SPECIMN AEROBIC: CPT

## 2017-07-14 PROCEDURE — 90746 HEPB VACCINE 3 DOSE ADULT IM: CPT | Performed by: FAMILY MEDICINE

## 2017-07-14 PROCEDURE — G0010 ADMIN HEPATITIS B VACCINE: HCPCS | Performed by: FAMILY MEDICINE

## 2017-07-14 RX ORDER — OXYCODONE HYDROCHLORIDE 10 MG/1
10-30 TABLET ORAL EVERY 6 HOURS PRN
Qty: 90 TAB | Refills: 0 | Status: SHIPPED | OUTPATIENT
Start: 2017-07-14 | End: 2017-10-20 | Stop reason: SDUPTHER

## 2017-07-14 RX ORDER — FLUCONAZOLE 100 MG/1
100 TABLET ORAL DAILY
Qty: 10 TAB | Refills: 0 | Status: SHIPPED | OUTPATIENT
Start: 2017-07-14 | End: 2017-09-19 | Stop reason: SDUPTHER

## 2017-07-14 RX ORDER — ALPRAZOLAM 0.5 MG/1
0.5 TABLET ORAL 3 TIMES DAILY
Qty: 90 TAB | Refills: 2 | Status: SHIPPED | OUTPATIENT
Start: 2017-07-14 | End: 2017-10-20 | Stop reason: SDUPTHER

## 2017-07-14 RX ORDER — MORPHINE SULFATE 15 MG/1
15 TABLET ORAL EVERY 4 HOURS PRN
Qty: 30 TAB | Refills: 0 | Status: ON HOLD | OUTPATIENT
Start: 2017-07-14 | End: 2017-10-31

## 2017-07-14 RX ORDER — MORPHINE SULFATE 15 MG/1
15 TABLET, FILM COATED, EXTENDED RELEASE ORAL EVERY 12 HOURS
Qty: 30 TAB | Refills: 0 | Status: SHIPPED | OUTPATIENT
Start: 2017-07-14 | End: 2017-07-14

## 2017-07-14 RX ORDER — OXYCODONE HYDROCHLORIDE 10 MG/1
10-30 TABLET ORAL EVERY 6 HOURS PRN
Qty: 90 TAB | Refills: 0 | Status: SHIPPED | OUTPATIENT
Start: 2017-07-14 | End: 2017-07-14 | Stop reason: SDUPTHER

## 2017-07-14 NOTE — MR AVS SNAPSHOT
"        Roxana Cuba   2017 10:00 AM   Office Visit   MRN: [id number]    Department:  [facility]   Dept Phone:  [phone]    Description:  Female : 1960   Provider:  Bernarda Reyes D.O.           Reason for Visit     Chronic Opiate Therapy Rx      Allergies as of 2017     Allergen Noted Reactions    Rhubarb 2009   Anaphylaxis    Vancomycin Hcl 2016       Causes red man syndrome when infused to fast        You were diagnosed with     Chronic pain syndrome   [338.4.ICD-9-CM]       Chronic use of opiate drugs therapeutic purposes   [2723485]       Chronic bilateral thoracic back pain   [4439103]       Chronic prescription benzodiazepine use   [0308917]       Rib pain on left side   [398219]       Need for vaccination   [409634]       Oral thrush   [693060]       Herpes zoster without complication   [803122]       Sarcoidosis (CMS-[facility])   [135.ICD-9-CM]       Status post liver transplant (CMS-[facility])   [975054]       Pulmonary hypertension (CMS-[facility])   [420382]       SBO (small bowel obstruction) (CMS-[facility])   [900172]       Immunocompromised state (CMS-[facility])   [282069]       Hepatopulmonary syndrome (CMS-[facility])   [573.5.ICD-9-CM]       IDDM (insulin dependent diabetes mellitus) (CMS-[facility])   [129433]       Iron deficiency anemia, unspecified iron deficiency anemia type   [9076982]       Chronic respiratory failure with hypoxia (CMS-[facility])   [850826]       CKD (chronic kidney disease) stage 3, GFR 30-59 ml/min   [650640]       Chronic obstructive pulmonary disease, unspecified COPD type (CMS-[facility])   [4714078]       Recurrent major depressive disorder, in partial remission (CMS-[facility])   [9520511]       Hx of gastric bypass   [098940]       History of pancreatitis   [747759]       Hypothyroidism, adult   [821012]         Vital Signs     Blood Pressure Pulse Temperature Respirations Height Weight    120/64 mmHg 69 37 °C (98.6 °F) 16 1.727 m (5' 8\") 78.019 kg (172 lb)    Body Mass Index Oxygen " Saturation Last Menstrual Period Smoking Status          26.16 kg/m2 95% 01/03/2000 Passive Smoke Exposure - Never Smoker        Basic Information     Date Of Birth Sex Race Ethnicity Preferred Language    1960 Female White Non- English      Your appointments     Aug 08, 2017 10:00 AM   FOLLOW UP with Maxwell Phan M.D.   [facility] (--)    [address]  [place] [address]   [phone]            Aug 09, 2017  8:00 AM   Adult Draw/Collection with [facility]   [facility] (--)    [address] [place] [address]   [phone]            Aug 21, 2017  2:40 PM   Established Patient Pul with A Rotation   [facility] (--)    [address]  [address]  [place] [id number] [phone]            Sep 06, 2017  8:00 AM   Adult Draw/Collection with [facility]   [facility] (--)    [address] [place] [address]   [phone]            Oct 11, 2017  8:00 AM   Adult Draw/Collection with [facility]   [facility] (--)    [address] [place] [address]   [phone]            Oct 20, 2017 10:20 AM   Established Patient with Bernarda Reyes D.O.   [facility])    [address], [address]  [place] [id number] [phone]           You will be receiving a confirmation call a few days before your appointment from our automated call confirmation system.            Nov 08, 2017  8:00 AM   Adult Draw/Collection with [facility]   [facility] (--)    [address] [place] [address]   [phone]            Dec 06, 2017  8:00 AM   Adult Draw/Collection with [facility]   [facility] (--)    [address] [place] [address]   [phone]              Problem List              ICD-10-CM Priority Class Noted - Resolved    Status post liver transplant Dr. [place] ([facility]) Z94.4 Medium  3/13/2012 - Present     Hypothyroidism, adult E03.9 Low  3/13/2012 - Present    History of pancreatitis Z87.19 Medium  6/20/2012 - Present    H/O non-ST elevation myocardial infarction (NSTEMI) I25.2   5/16/2013 - Present    Lower extremity weakness R29.898 Low  6/26/2013 - Present    Anemia D64.9 Medium  9/13/2013 - Present    Anxiety F41.9 Low  11/4/2013 - Present    Insomnia G47.00 Low  11/4/2013 - Present    Sarcoidosis (CMS-HCC) D86.9 Medium  11/14/2013 - Present    COPD (chronic obstructive pulmonary disease) (CMS-HCC) J44.9 Low  11/14/2013 - Present    MGUS (monoclonal gammopathy of unknown significance) D47.2 Medium  11/14/2013 - Present    CKD (chronic kidney disease) stage 3, GFR 30-59 ml/min- unclear cause, probably multifactorial N18.3 Medium  2/25/2014 - Present    Hepatopulmonary syndrome (CMS-HCC) K76.81 Medium  3/5/2014 - Present    Ostium secundum type atrial septal defect, S/P percutaneous closure Q21.1 Medium  3/17/2014 - Present    Mild aortic regurgitation and aortic valve sclerosis I35.1 High  3/17/2014 - Present    Vitamin D deficiency E55.9 Medium  8/26/2014 - Present    Chronic respiratory failure (CMS-HCC) J96.10 Medium  11/13/2014 - Present    Pure hypercholesterolemia E78.00 High  12/9/2014 - Present    Pulmonary hypertension (CMS-HCC) I27.2 Low  2/3/2015 - Present    On prednisone therapy Z79.52   7/6/2015 - Present    Mild mitral regurgitation I34.0   7/14/2015 - Present    Splenomegaly R16.1   7/14/2015 - Present    Immunocompromised state (CMS-HCC) D84.9   11/14/2015 - Present    Splenic lesion D73.9   11/14/2015 - Present    Hx of gastric bypass Z98.890   1/6/2016 - Present    Back pain M54.9   2/22/2016 - Present    Chronic pain syndrome G89.4   6/15/2016 - Present    Other allergic rhinitis J30.89   7/22/2016 - Present    Thrombocytopenia (CMS-HCC) D69.6   8/15/2016 - Present    History of lung biopsy Z98.890   10/17/2016 - Present    GERD (gastroesophageal reflux disease) K21.9   1/4/2017 - Present     Iron deficiency E61.1   1/20/2017 - Present    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 difficile diarrhea A04.7   7/14/2017 - Present    VRE (vancomycin-resistant Enterococci) A49.1, Z16.21   7/14/2017 - Present    Herpes zoster without complication B02.9   7/14/2017 - Present      Health Maintenance        Date Due Completion Dates    IMM HEP B VACCINE (1 of 3 - Primary Series) 1960 ---    IMM INFLUENZA (1) 1/30/2018 (Originally 9/1/2017) 10/1/2016, 9/28/2015, 9/28/2015, 10/14/2014, 10/1/2014, 9/16/2013, 9/16/2013, 9/27/2012, 9/27/2012, 10/10/2011, 10/1/2011, 10/19/2010    MAMMOGRAM 2/1/2018 2/1/2017, 12/13/2016, 11/2/2015, 10/20/2014, 8/4/2013 (Done)    Override on 8/4/2013: Done ([facility])    COLONOSCOPY 3/27/2027 3/27/2017, 9/20/2010    IMM DTaP/Tdap/Td Vaccine (2 - Td) 4/12/2027 4/12/2017            Current Immunizations     13-VALENT PCV PREVNAR 8/21/2016  9:40 AM, 8/21/2016    Hepatitis B Vaccine Recombivax (Adol/Adult) 7/14/2017    Influenza TIV (IM) 9/28/2015, 10/14/2014, 9/16/2013 11:43 PM, 9/27/2012 11:57 PM, 10/1/2011    Influenza Vaccine Adult HD 10/1/2016    Influenza Vaccine Quad Inj (Preserved) 9/28/2015, 10/1/2014, 9/16/2013, 9/27/2012, 10/10/2011, 10/19/2010    Pneumococcal polysaccharide vaccine (PPSV-23) 1/4/2017  6:09 AM, 1/4/2017, 10/8/2009  4:25 PM, 10/8/2009, 7/30/2009, 7/30/2009    Tdap Vaccine 4/12/2017      Below and/or attached are the medications your provider expects you to take. Review  all of your home medications and newly ordered medications with your provider and/or pharmacist. Follow medication instructions as directed by your provider and/or pharmacist. Please keep your medication list with you and share with your provider. Update the information when medications are discontinued, doses are changed, or new medications (including over-the-counter products) are added; and carry medication information at all times in the event of emergency situations     Allergies:  RHUBARB - Anaphylaxis     VANCOMYCIN HCL - (reactions not documented)               Medications  Valid as of: July 14, 2017 - 11:44 AM    Generic Name Brand Name Tablet Size Instructions for use    ALPRAZolam (Tab) XANAX 0.5 MG Take 1 Tab by mouth 3 times a day.        Ambrisentan (Tab) LETAIRIS 10 MG Take 1 Tab by mouth every day.        Ascorbic Acid (Tab) ascorbic acid 500 MG Take 500 mg by mouth every day.        Aspirin (Tablet Delayed Response) ECOTRIN 81 MG Take 1 Tab by mouth every morning.        Azithromycin (Tab) ZITHROMAX 250 MG Take 1 Tab by mouth every day.        Calcium Citrate-Vitamin D (Tab) CITRACAL MAXIMUM 315-250 MG-UNIT TAKE 2 TABS BY MOUTH 2X/ DAY WITH FOOD BEFORE AND AFTER DINNER FOR LIFE-CRUSH 1ST 3 MONTH, SPACE MVI        Cyclobenzaprine HCl (Tab) FLEXERIL 10 MG Take 10 mg by mouth 3 times a day as needed.        Docusate Sodium (Cap) COLACE 100 MG Take 100 mg by mouth every day.        Ferrous Sulfate (Elixir) FEOSOL 220 (44 FE) MG/5ML Take 5 mL by mouth every day.        Fluconazole (Tab) DIFLUCAN 100 MG Take 1 Tab by mouth every day for 10 days.        Fluticasone Propionate (Suspension) FLONASE 50 MCG/ACT Spray 1 Spray in nose every day.        Furosemide (Tab) LASIX 40 MG TAKE 1 TAB BY MOUTH EVERY DAY.        Gabapentin (Tab) NEURONTIN 600 MG Take 1 Tab by mouth 3 times a day.        Levothyroxine Sodium (Tab) SYNTHROID 137 MCG Take 1 Tab by mouth every day.        Linaclotide (Cap) Linaclotide 290 MCG  Take 290 mg by mouth every day.        Morphine Sulfate (Tab) MS IR 15 MG Take 1 Tab by mouth every four hours as needed for Severe Pain.        Mycophenolate Mofetil (Cap) CELLCEPT 250 MG Take 500 mg by mouth 2 times a day.        Omeprazole (CAPSULE DELAYED RELEASE) PRILOSEC 40 MG Take 1 Cap by mouth every day.        Ondansetron (TABLET DISPERSIBLE) ZOFRAN ODT 4 MG Take 1 Tab by mouth every 8 hours as needed for Nausea/Vomiting. Nausea and vomiting        OxyCODONE HCl (Tab) ROXICODONE 10 MG Take 1-3 Tabs by mouth every 6 hours as needed for Moderate Pain.        Pravastatin Sodium (Tab) PRAVACHOL 20 MG Take 1 Tab by mouth every day.        PredniSONE (Tab) DELTASONE 5 MG Take 7 mg by mouth every morning.        Probiotic Product   Take 1 Cap by mouth every day.        Sennosides (Tab) SENOKOT 8.6 MG Take 17.2 mg by mouth 2 times a day.        Sertraline HCl (Tab) ZOLOFT 100 MG Take 100 mg by mouth every bedtime.        Tacrolimus (Cap) PROGRAF 0.5 MG Take 1.5 mg by mouth 2 times a day.        Tadalafil (PAH) (Tab) Tadalafil (PAH) 20 MG Take 40 mg by mouth every bedtime. Indications: Pulmonary Arterial Hypertension        Tiotropium Bromide Monohydrate (Cap) SPIRIVA 18 MCG Inhale 1 Cap by mouth every day.        TraZODone HCl (Tab) DESYREL 50 MG Take 1-2 Tabs by mouth at bedtime as needed for Sleep.        .                 Medicines prescribed today were sent to:     [id number] - [place] - [address] [facility]    [address] [place] [address]    Phone: [phone] Fax: [phone]    Open 24 Hours?: No    [facility] [place], [place] - [address]    [address] [place] [address]    Phone: [phone] Fax: [phone]    Open 24 Hours?: No    [facility] [place] [place] - [address]    [address] [place] [address]    Phone: [phone] Fax: [phone]    Open 24 Hours?: No      Medication refill instructions:       If  your prescription bottle indicates you have medication refills left, it is not necessary to call your provider’s office. Please contact your pharmacy and they will refill your medication.    If your prescription bottle indicates you do not have any refills left, you may request refills at any time through one of the following ways: The online [employer] system (except Urgent Care), by calling your provider’s office, or by asking your pharmacy to contact your provider’s office with a refill request. Medication refills are processed only during regular business hours and may not be available until the next business day. Your provider may request additional information or to have a follow-up visit with you prior to refilling your medication.   *Please Note: Medication refills are assigned a new Rx number when refilled electronically. Your pharmacy may indicate that no refills were authorized even though a new prescription for the same medication is available at the pharmacy. Please request the medicine by name with the pharmacy before contacting your provider for a refill.        Your To Do List     Future Labs/Procedures Complete By Expires    CULTURE THROAT  As directed 7/14/2018    [id number] (WITH 1-VIEW CXR)  As directed 7/14/2018         [employer] Access Code: Activation code not generated  Current [employer]ema Status: Active

## 2017-07-14 NOTE — PROGRESS NOTES
Subjective:     Chief Complaint   Patient presents with   • Chronic Opiate Therapy     Rx       Roxana Cuba is a 57 y.o. female here today for follow-up on chronic pain as well as chronic medical conditions.    Patient reports generalized malaise and sore throat. Patient denies difficulty swallowing. She is to throat sore throat has been getting worse over the past 5 days.    Patient reports a new complaint of left sided rib pain. Patient has had 2 injuries to her ribs with in the past 2 weeks. Patient was seen in ER on 7/6/17 after first injury which was fall onto blocks. Rib x-ray at that time showed no fracture. Previously patient had chest x-ray on 6/26/17 which showed left sided old rib fracture. On 7/8/17 patient had a second injury to her ribs when patient's grandson accidentally bumped into her.  Since then she has any increasing pain. Pain is worsened with deep breath and any motion of the ribs/mid back. Patient reports pain is located on the left lateral chest wall. Pain is stabbing in nature and can be up to 10 out of 10. Patient has minimal improvement with oxycodone and has needed to take more medication to help treat pain.     Chronic Pain:   Patient has chronic pain due to post surgical pain and chronic thoracic pain. Patient is currently taking oxycodone 10 mg 3 times a day. Patient reports that with out pain medication her pain as 10/10 located at the right rib side where she previously had a lung biopsy, in the mid thoracic back, and now in the low back. For the past 2 weeks patient has had left-sided rib pain as noted above.  Patient reports that her pain is worse with bending and lifting. Heat and rest improve her generalized pain.     Opioid Risk Tool   Hector each box that applies   Female   Male     Family history of substance abuse     Alcohol   1   3     Illegal drugs   2   3     Rx drugs   4   4     Personal history of substance abuse     Alcohol   3   3     Illegal drugs   4   4     Rx  drugs   5   5     Age between 16 -- 35 years   1   1     History of preadolescent sexual abuse   3   0     Psychological disease     ADD, OCD, bipolar, schizophrenia   2   2     Depression   1   1     Total Score  2        Low risk 0-3: Recommend annual UDS    Consequences of Chronic Opiate therapy:  (5 A's)  Analgesia: Improved with medication  Activity: Restricted ADLs , improved with use of medication  Adverse Events:   constipation, Patient reports constipation has been stable since SBO  Aberrant Behaviors:  None  and patient reports consistent  Affect/Mood: Stable mood, appropriate affect  Signs of oversedation. No signs of withdrawal.       Patient has a history of anxiety. She is currently taking Xanax as needed up to 3 times a day. Patient states that prednisone makes her anxiety worse. Patient needs to be on prednisone due to her history of sarcoidosis.    Patient reports constipation has been stable since SBO            Diabetes: Patient reports she is currently controlling with lifestyle changes. She is not using insulin. Morning fasting blood sugars range . After meal blood sugars are typically in the 130s.    Patient underwent also underwent a gastric bypass. As result patient has significant amount of abdominal tissue which has caused significant impairments in her life as well as chronic skin infection.    Patient has a complicated past medical history including sarcoidosis, liver transplant, chronic respiratory failure, NSTEMI,  pancreatitis, CKD, hepatopulmonary syndrome, COPD, MGUS, and pulmonary hypertension. Patient is on antirejection medication as well as prednisone which results in an immunocompromised state. Patient has had shingles, VRE and C. difficile.    Allergies   Allergen Reactions   • Rhubarb Anaphylaxis   • Vancomycin Hcl      Causes red man syndrome when infused to fast       Current medicines (including changes today)  Current Outpatient Prescriptions   Medication Sig  Dispense Refill   • oxycodone immediate release (ROXICODONE) 10 MG immediate release tablet Take 1-3 Tabs by mouth every 6 hours as needed for Moderate Pain. 90 Tab 0   • morphine (MS IR) 15 MG tablet Take 1 Tab by mouth every four hours as needed for Severe Pain. 30 Tab 0   • alprazolam (XANAX) 0.5 MG Tab Take 1 Tab by mouth 3 times a day. 90 Tab 2   • fluconazole (DIFLUCAN) 100 MG Tab Take 1 Tab by mouth every day for 10 days. 10 Tab 0   • Blood Glucose Monitoring Suppl Device Meter: Dispense Device of Insurance Preference. Sig. Use as directed for blood sugar monitoring. #1. NR. 1 Device 0   • Blood Glucose Monitoring Suppl SUPPLIES Misc Test strips order: Test strips for insurance preferred me meter. Sig: use Qdaily and prn ssx high or low sugar #100 RF x 3 100 Strip 2   • furosemide (LASIX) 40 MG Tab TAKE 1 TAB BY MOUTH EVERY DAY. 30 Tab 6   • cyclobenzaprine (FLEXERIL) 10 MG Tab Take 10 mg by mouth 3 times a day as needed.     • ferrous sulfate (FEOSOL) 220 (44 FE) MG/5ML Elixir Take 5 mL by mouth every day. 1 Bottle 10   • CITRACAL MAXIMUM 315-250 MG-UNIT Tab TAKE 2 TABS BY MOUTH 2X/ DAY WITH FOOD BEFORE AND AFTER DINNER FOR LIFE-CRUSH 1ST 3 MONTH, SPACE  Tab 11   • tacrolimus (PROGRAF) 0.5 MG Cap Take 1.5 mg by mouth 2 times a day.     • ascorbic acid (ASCORBIC ACID) 500 MG Tab Take 500 mg by mouth every day.     • sennosides (SENOKOT) 8.6 MG Tab Take 17.2 mg by mouth 2 times a day.     • Probiotic Product (PROBIOTIC DAILY PO) Take 1 Cap by mouth every day.     • aspirin EC (ECOTRIN) 81 MG Tablet Delayed Response Take 1 Tab by mouth every morning. 90 Tab 4   • gabapentin (NEURONTIN) 600 MG tablet Take 1 Tab by mouth 3 times a day. 270 Tab 4   • levothyroxine (SYNTHROID) 137 MCG Tab Take 1 Tab by mouth every day. 90 Tab 4   • omeprazole (PRILOSEC) 40 MG delayed-release capsule Take 1 Cap by mouth every day. 90 Cap 4   • ondansetron (ZOFRAN ODT) 4 MG TABLET DISPERSIBLE Take 1 Tab by mouth every 8 hours  as needed for Nausea/Vomiting. Nausea and vomiting 270 Tab 4   • trazodone (DESYREL) 50 MG Tab Take 1-2 Tabs by mouth at bedtime as needed for Sleep. 90 Tab 4   • tiotropium (SPIRIVA) 18 MCG Cap Inhale 1 Cap by mouth every day. 90 Cap 4   • fluticasone (FLONASE) 50 MCG/ACT nasal spray Spray 1 Spray in nose every day. 16 g 10   • predniSONE (DELTASONE) 5 MG Tab Take 7 mg by mouth every morning.     • sertraline (ZOLOFT) 100 MG Tab Take 100 mg by mouth every bedtime.     • ambrisentan (LETAIRIS) 10 MG tablet Take 1 Tab by mouth every day. 30 Tab 11   • docusate sodium (COLACE) 100 MG Cap Take 100 mg by mouth every day.     • mycophenolate (CELLCEPT) 250 MG Cap Take 500 mg by mouth 2 times a day.     • Tadalafil, PAH, (ADCIRCA) 20 MG Tab Take 40 mg by mouth every bedtime. Indications: Pulmonary Arterial Hypertension     • pravastatin (PRAVACHOL) 20 MG Tab Take 1 Tab by mouth every day. 30 Tab 11   • Linaclotide (LINZESS) 290 MCG Cap Take 290 mcg by mouth.     • azithromycin (ZITHROMAX) 250 MG Tab Take 1 Tab by mouth every day. 4 Tab 0   • Linaclotide 290 MCG Cap Take 290 mg by mouth every day. 90 Cap 3     No current facility-administered medications for this visit.     Social History   Substance Use Topics   • Smoking status: Passive Smoke Exposure - Never Smoker   • Smokeless tobacco: Never Used      Comment: avoid all tobacco products   • Alcohol Use: No      Comment: 2009 quit drinking     Family Status   Relation Status Death Age   • Father     • Mother     • Sister Alive      Family History   Problem Relation Age of Onset   • Other Father      Unknown (dead 10 years)   • Diabetes Father    • Heart Disease Father    • Hypertension Father    • Hyperlipidemia Father    • Stroke Father    • Non-contributory Mother    • Hyperlipidemia Mother    • Alcohol/Drug Mother    • Cancer Paternal Aunt    • Alcohol/Drug Maternal Grandmother    • Alcohol/Drug Maternal Grandfather      She    has a past medical  "history of Cirrhosis (CMS-HCC) (December 2011); S/P cholecystectomy; GERD (gastroesophageal reflux disease); Psychiatric disorder; Fracture of left foot; Bronchitis ( ); CKD (chronic kidney disease) stage 3, GFR 30-59 ml/min; Breath shortness; Chronic fatigue and malaise; VRE (vancomycin-resistant Enterococci); Low back pain (02-17-17); Pneumonia; Mild aortic regurgitation and aortic valve sclerosis ( ); Splenomegaly; Small bowel obstruction (CMS-HCC); On home oxygen therapy; Pulmonary hypertension (CMS-HCC); Diabetes (CMS-HCC); Hypothyroid; H/O Clostridium difficile infection (02-17-17); Platelet disorder (CMS-HCC); and Cardiomegaly.        ROS  GEN: generalized malaise, Episodic fevers and chills, none currently  HEENT: no blurry vision or change in vision  Resp: + chronic  shortness of breath  CV: no racing heart, no irregular beats  Abd: + general abd pain, + episodic nausea, no vomiting, no diarrhea, + constipation, no blood in stool, no dark stools, no incontinence  : no dysuria, no hematuria, no urinary incontinence, no increased frequency  MSK: LBP, thoracic pain, rib pain  Neuro: no headaches, no dizziness, no LOC       Objective:     Blood pressure 120/64, pulse 69, temperature 37 °C (98.6 °F), resp. rate 16, height 1.727 m (5' 8\"), weight 78.019 kg (172 lb), last menstrual period 01/03/2000, SpO2 95 %. Body mass index is 26.16 kg/(m^2).   Physical Exam:  Constitutional: Alert, no distress.  Skin: Warm, dry, good turgor  Eye: Equal, round and reactive, conjunctiva clear, lids normal.  ENMT: Lips without lesions,oropharynx erythematous with thick white discharge noted,  moist oral mucosa  Neck: Trachea midline, no masses, no thyromegaly. No cervical or supraclavicular lymphadenopathy. Full ROM  Respiratory: Unlabored respiratory effort, good air movement, lungs clear to auscultation, no wheezes, no ronchi, nasal cannula in place.  Cardiovascular:RRR, +S1, S2, no murmur, no peripheral edema, pedal and " radial pulses equal and intact bilat  Abdomen: Distended, Soft, non-tender, no masses, no hepatosplenomegaly.  MSK:5/5 muscle strength in upper extremities as well as lower extremity bilaterally, tenderness to palpation T8-10 with surrounding, hypertonic paraspinal muscles, TTP L3 and L4, TTP along Rib Left rib 4-6  Neuro: no gross motor or sensory deficits      Assessment and Plan:   The following treatment plan was discussed    New concerns and conditions for which medications were prescribed today listed first.    1. Oral thrush  New Finding: Patient has been hospitalized recently and is in immunocompromised status post liver transplant. Culture obtained. As per up to date patient status post liver transplant are at risk for fungal infections for which fluconazole 400mg daily is noted to be used as prophylaxis. I  will give fluconazole at significantly reduced dose compared to prophylaxis for treatment of thrush.  - CULTURE THROAT; Future  - fluconazole (DIFLUCAN) 100 MG Tab; Take 1 Tab by mouth every day for 10 days.  Dispense: 10 Tab; Refill: 0    2. Rib pain on left side  New finding: Repeat rib x-rays ordered. Old fracture evident. Patient given advice during appointment to limit activity, increase pain medications, and use ice as tolerated.  - [id number] (WITH 1-VIEW CXR); Future    3. Chronic use of opiate drugs therapeutic purposes  Morphine 15 1-4 times per day added due to new rib pain.  - I stressed the importance of  non-narcotic treatments to control pain including  NSAIDs, PT, surgery, gabapentin, Cymbalta, topical analgesics,home exercise plan, and pain management for injections.    -  I reviewed medications. The medications have  been  helpful for controlling the pain, maintaining mood, and allowing the patient to function, including ADLs.     -Current MED:45 with Morphine:  depending on pain level   - Side effects are controlled. No constipation    - No aberrant behavior noted.  -  I  reviewed diagnostic studies. I reviewed lab studies.  I reviewed medical issues. I reviewed social issues.  - I have obtained and reviewed [facility]. The pt has a meaningful improvement in function and pain without significant risk of harm. Based on my assessment through pt's  Report of pain, physical exam, diagnostic imaging, and review of records including , the controlled medicine prescriptions written today are medically necessary.  -The patient was advised to avoid alcohol use with prescribed medication. I counseled the patient regarding risks, side effects, and interactions of medications. Pt is advised to take no drugs and  take only medications reported to this office and prescribed.  I counseled the patient on the controlled substance prescribing program. I reviewed further symptomatic medications. I advised the pt of risk of use of NSAIDs for PUD and renal concerns.   -No acetaminophen due to liver transplant    Date of Last therapy agreement/information sheet: today  Opiate Risk score: 2  Frequency of UDS: annual  Date of last UDS: April 2017   verified: today  Discrepancies: none  - oxycodone immediate release (ROXICODONE) 10 MG immediate release tablet; Take 1-3 Tabs by mouth every 6 hours as needed for Moderate Pain.  Dispense: 90 Tab; Refill: 0  - oxycodone immediate release (ROXICODONE) 10 MG immediate release tablet; Take 1-3 Tabs by mouth every 6 hours as needed for Moderate Pain.  Dispense: 90 Tab; Refill: 0  - oxycodone immediate release (ROXICODONE) 10 MG immediate release tablet; Take 1-3 Tabs by mouth every 6 hours as needed for Moderate Pain.  Dispense: 90 Tab; Refill: 0  - morphine (MS IR) 15 MG tablet; Take 1 Tab by mouth every four hours as needed for Severe Pain.  Dispense: 30 Tab; Refill: 0    4. Chronic bilateral thoracic back pain  Chronic: Thoracic pain is worsened due to rib injury.  - oxycodone immediate release (ROXICODONE) 10 MG  immediate release tablet; Take 1-3 Tabs by mouth every 6 hours as needed for Moderate Pain.  Dispense: 90 Tab; Refill: 0  - morphine (MS IR) 15 MG tablet; Take 1 Tab by mouth every four hours as needed for Severe Pain.  Dispense: 30 Tab; Refill: 0    5. Chronic prescription benzodiazepine use  Chronic: Due to depression/anxiety. Risks and benefits discussed. Patient understands increased risk of respiratory depression and overdose.  verified.  - alprazolam (XANAX) 0.5 MG Tab; Take 1 Tab by mouth 3 times a day.  Dispense: 90 Tab; Refill: 2    6. Need for vaccination  I discussed benefits and side effects of each vaccine with patient, and I answered all patient's questions about vaccines.  Patient reports Dr. Canada, liver specialist, recommend patient be vaccinated for hep B as well as shingles.  - Hep B Adult 20+    7. Herpes zoster without complication  Patient reports 5 recurrent episodes. Will give heparin B vaccine today after completion will give she was vaccine     8. Status post liver transplant Dr. Canada ([facility])  Patient will need to continue to follow with [facility] who performed transplant. Recommend all surgeries be performed [facility] [place] [facility]. Patient's liver specialist Dr. Canada has advised all surgeries and procedures be performed with his supervision at [facility] [place] [facility]    9. Immunocompromised state (CMS-HCC)  Status post liver transplant. Patient is at risk for frequent and recurrent infections.    10. Intertriginous dermatitis associated with moisture  Patient has recurrent intertrigo (skin infection) of abdominal pannus status post significant weight loss due to gastric surgery. Patient is at increased risk for infection due to immunocompromised state. Patient requires abdominoplasty due to recurrence of infection. As stated above all surgeries need to take place under Dr. Canada's supervision at [facility] [place] [facility]. Therefore referral has been created to [facility] for abdominoplasty.    11. Sarcoidosis (CMS-HCC)  Chronic:  Continue treatment with specialists.    12. Pulmonary hypertension (CMS-HCC)  Chronic: Continue treatment with pulmonology as well as sarcoid specialist    13. Chronic respiratory failure with hypoxia (CMS-HCC)  Chronic: Continue continuous oxygen.    14. Type 2 diabetes mellitus without complication, without long-term current use of insulin (CMS-HCC)  Well-controlled after lifestyle changes. Recommend continuing to monitor blood sugars.  - Blood Glucose Monitoring Suppl Device; Meter: Dispense Device of Insurance Preference. Sig. Use as directed for blood sugar monitoring. #1. NR.  Dispense: 1 Device; Refill: 0  - Blood Glucose Monitoring Suppl SUPPLIES Misc; Test strips order: Test strips for insurance preferred me meter. Sig: use Qdaily and prn ssx high or low sugar #100 RF x 3  Dispense: 100 Strip; Refill: 2    15. SBO (small bowel obstruction) (CMS-HCC)  Chronic: Patient reports constipation since bowel obstruction.    16. Iron deficiency anemia, unspecified iron deficiency anemia type  Chronic: Due to gastric surgery. Continue iron. Iron is likely worsening patient's constipation.    17. CKD (chronic kidney disease) stage 3, GFR 30-59 ml/min- unclear cause, probably multifactorial  Chronic: Continue to avoid nephrotoxic medications    18. Chronic obstructive pulmonary disease, unspecified COPD type (CMS-HCC)  Chronic: Continue Spiriva and follow with pulmonology    19. Recurrent major depressive disorder, in partial remission (CMS-HCC)  Chronic: Currently stable with use of Xanax for anxiety, Zoloft, and trazodone for sleep    20. Hx of gastric bypass   Continue  vitamin supplementation.   21. History of pancreatitis  No recent flare  22. Hypothyroidism, adult  Continue levothyroxine. Most recent TSH within normal limits in January 2017    This was a greater than 45 minute visit. 40 minutes was spent face-to-face with the patient discussing chronic medical conditions, new problems, and reviewing  history.    Followup: Return in about 3 months (around 10/14/2017) for chronic conditions, chonric pain, Long.    Please note that this dictation was created using voice recognition software. I have made every reasonable attempt to correct obvious errors, but I expect that there are errors of grammar and possibly content that I did not discover before finalizing the note.

## 2017-07-14 NOTE — TELEPHONE ENCOUNTER
----- Message from Bernarda Reyes D.O. sent at 7/14/2017  1:46 PM PDT -----  Please call pt to inform them her that x-ray of the ribs shows a small old fracture of the fifth rib on the left. Patient's current pain is most likely due to inflammation from recent injury on top of old fracture. I recommend rest, ice, and increased pain medication as needed. Rib pain may last 4-6 weeks.  -Dr. Reyes

## 2017-07-16 LAB
BACTERIA SPEC RESP CULT: ABNORMAL
BACTERIA SPEC RESP CULT: ABNORMAL
SIGNIFICANT IND 70042: ABNORMAL
SOURCE SOURCE: ABNORMAL

## 2017-07-17 ENCOUNTER — HOSPITAL ENCOUNTER (OUTPATIENT)
Dept: LAB | Facility: MEDICAL CENTER | Age: 57
End: 2017-07-17
Attending: INTERNAL MEDICINE
Payer: MEDICARE

## 2017-07-17 LAB
ALBUMIN SERPL BCP-MCNC: 4.3 G/DL (ref 3.2–4.9)
ALBUMIN/GLOB SERPL: 1.8 G/DL
ALP SERPL-CCNC: 33 U/L (ref 30–99)
ALT SERPL-CCNC: 17 U/L (ref 2–50)
ANION GAP SERPL CALC-SCNC: 6 MMOL/L (ref 0–11.9)
AST SERPL-CCNC: 18 U/L (ref 12–45)
BASOPHILS # BLD AUTO: 0.8 % (ref 0–1.8)
BASOPHILS # BLD: 0.03 K/UL (ref 0–0.12)
BILIRUB SERPL-MCNC: 0.4 MG/DL (ref 0.1–1.5)
BUN SERPL-MCNC: 24 MG/DL (ref 8–22)
CALCIUM SERPL-MCNC: 9.8 MG/DL (ref 8.5–10.5)
CHLORIDE SERPL-SCNC: 101 MMOL/L (ref 96–112)
CO2 SERPL-SCNC: 32 MMOL/L (ref 20–33)
CREAT SERPL-MCNC: 1.14 MG/DL (ref 0.5–1.4)
EOSINOPHIL # BLD AUTO: 0.17 K/UL (ref 0–0.51)
EOSINOPHIL NFR BLD: 4.3 % (ref 0–6.9)
ERYTHROCYTE [DISTWIDTH] IN BLOOD BY AUTOMATED COUNT: 45.1 FL (ref 35.9–50)
GFR SERPL CREATININE-BSD FRML MDRD: 49 ML/MIN/1.73 M 2
GGT SERPL-CCNC: 10 U/L (ref 7–34)
GLOBULIN SER CALC-MCNC: 2.4 G/DL (ref 1.9–3.5)
GLUCOSE SERPL-MCNC: 138 MG/DL (ref 65–99)
HCT VFR BLD AUTO: 38.4 % (ref 37–47)
HGB BLD-MCNC: 12.4 G/DL (ref 12–16)
IMM GRANULOCYTES # BLD AUTO: 0.01 K/UL (ref 0–0.11)
IMM GRANULOCYTES NFR BLD AUTO: 0.3 % (ref 0–0.9)
LYMPHOCYTES # BLD AUTO: 1.21 K/UL (ref 1–4.8)
LYMPHOCYTES NFR BLD: 30.3 % (ref 22–41)
MAGNESIUM SERPL-MCNC: 2.2 MG/DL (ref 1.5–2.5)
MCH RBC QN AUTO: 29.2 PG (ref 27–33)
MCHC RBC AUTO-ENTMCNC: 32.3 G/DL (ref 33.6–35)
MCV RBC AUTO: 90.4 FL (ref 81.4–97.8)
MONOCYTES # BLD AUTO: 0.37 K/UL (ref 0–0.85)
MONOCYTES NFR BLD AUTO: 9.3 % (ref 0–13.4)
NEUTROPHILS # BLD AUTO: 2.21 K/UL (ref 2–7.15)
NEUTROPHILS NFR BLD: 55 % (ref 44–72)
NRBC # BLD AUTO: 0 K/UL
NRBC BLD AUTO-RTO: 0 /100 WBC
PLATELET # BLD AUTO: 78 K/UL (ref 164–446)
PMV BLD AUTO: 9.4 FL (ref 9–12.9)
POTASSIUM SERPL-SCNC: 3.8 MMOL/L (ref 3.6–5.5)
PROT SERPL-MCNC: 6.7 G/DL (ref 6–8.2)
RBC # BLD AUTO: 4.25 M/UL (ref 4.2–5.4)
SODIUM SERPL-SCNC: 139 MMOL/L (ref 135–145)
WBC # BLD AUTO: 4 K/UL (ref 4.8–10.8)

## 2017-07-17 PROCEDURE — 82977 ASSAY OF GGT: CPT

## 2017-07-17 PROCEDURE — 83735 ASSAY OF MAGNESIUM: CPT

## 2017-07-17 PROCEDURE — 80197 ASSAY OF TACROLIMUS: CPT

## 2017-07-17 PROCEDURE — 36415 COLL VENOUS BLD VENIPUNCTURE: CPT

## 2017-07-17 PROCEDURE — 85025 COMPLETE CBC W/AUTO DIFF WBC: CPT

## 2017-07-17 PROCEDURE — 80053 COMPREHEN METABOLIC PANEL: CPT

## 2017-07-18 ENCOUNTER — PATIENT MESSAGE (OUTPATIENT)
Dept: FAMILY PLANNING/WOMEN'S HEALTH CLINIC | Facility: OTHER | Age: 57
End: 2017-07-18

## 2017-07-19 LAB — TACROLIMUS BLD-MCNC: 1.4 NG/ML

## 2017-07-24 ENCOUNTER — HOSPITAL ENCOUNTER (OUTPATIENT)
Dept: LAB | Facility: MEDICAL CENTER | Age: 57
End: 2017-07-24
Attending: INTERNAL MEDICINE
Payer: MEDICARE

## 2017-07-24 DIAGNOSIS — I27.21 PAH (PULMONARY ARTERIAL HYPERTENSION) WITH PORTAL HYPERTENSION (HCC): ICD-10-CM

## 2017-07-24 DIAGNOSIS — K76.6 PAH (PULMONARY ARTERIAL HYPERTENSION) WITH PORTAL HYPERTENSION (HCC): ICD-10-CM

## 2017-07-24 LAB
ALBUMIN SERPL BCP-MCNC: 4.1 G/DL (ref 3.2–4.9)
ALBUMIN/GLOB SERPL: 1.6 G/DL
ALP SERPL-CCNC: 36 U/L (ref 30–99)
ALT SERPL-CCNC: 18 U/L (ref 2–50)
ANION GAP SERPL CALC-SCNC: 2 MMOL/L (ref 0–11.9)
AST SERPL-CCNC: 18 U/L (ref 12–45)
BASOPHILS # BLD AUTO: 0.5 % (ref 0–1.8)
BASOPHILS # BLD: 0.02 K/UL (ref 0–0.12)
BILIRUB SERPL-MCNC: 0.3 MG/DL (ref 0.1–1.5)
BUN SERPL-MCNC: 20 MG/DL (ref 8–22)
CALCIUM SERPL-MCNC: 9.4 MG/DL (ref 8.5–10.5)
CHLORIDE SERPL-SCNC: 100 MMOL/L (ref 96–112)
CO2 SERPL-SCNC: 37 MMOL/L (ref 20–33)
CREAT SERPL-MCNC: 1.16 MG/DL (ref 0.5–1.4)
EOSINOPHIL # BLD AUTO: 0.21 K/UL (ref 0–0.51)
EOSINOPHIL NFR BLD: 5.1 % (ref 0–6.9)
ERYTHROCYTE [DISTWIDTH] IN BLOOD BY AUTOMATED COUNT: 47.3 FL (ref 35.9–50)
GFR SERPL CREATININE-BSD FRML MDRD: 48 ML/MIN/1.73 M 2
GGT SERPL-CCNC: 9 U/L (ref 7–34)
GLOBULIN SER CALC-MCNC: 2.5 G/DL (ref 1.9–3.5)
GLUCOSE SERPL-MCNC: 121 MG/DL (ref 65–99)
HCT VFR BLD AUTO: 38.3 % (ref 37–47)
HGB BLD-MCNC: 12.2 G/DL (ref 12–16)
IMM GRANULOCYTES # BLD AUTO: 0.02 K/UL (ref 0–0.11)
IMM GRANULOCYTES NFR BLD AUTO: 0.5 % (ref 0–0.9)
LYMPHOCYTES # BLD AUTO: 1.17 K/UL (ref 1–4.8)
LYMPHOCYTES NFR BLD: 28.2 % (ref 22–41)
MAGNESIUM SERPL-MCNC: 2.4 MG/DL (ref 1.5–2.5)
MCH RBC QN AUTO: 29 PG (ref 27–33)
MCHC RBC AUTO-ENTMCNC: 31.9 G/DL (ref 33.6–35)
MCV RBC AUTO: 91.2 FL (ref 81.4–97.8)
MONOCYTES # BLD AUTO: 0.33 K/UL (ref 0–0.85)
MONOCYTES NFR BLD AUTO: 8 % (ref 0–13.4)
NEUTROPHILS # BLD AUTO: 2.4 K/UL (ref 2–7.15)
NEUTROPHILS NFR BLD: 57.7 % (ref 44–72)
NRBC # BLD AUTO: 0 K/UL
NRBC BLD AUTO-RTO: 0 /100 WBC
PLATELET # BLD AUTO: 79 K/UL (ref 164–446)
PMV BLD AUTO: 9.2 FL (ref 9–12.9)
POTASSIUM SERPL-SCNC: 4.8 MMOL/L (ref 3.6–5.5)
PROT SERPL-MCNC: 6.6 G/DL (ref 6–8.2)
RBC # BLD AUTO: 4.2 M/UL (ref 4.2–5.4)
SODIUM SERPL-SCNC: 139 MMOL/L (ref 135–145)
WBC # BLD AUTO: 4.2 K/UL (ref 4.8–10.8)

## 2017-07-24 PROCEDURE — 36415 COLL VENOUS BLD VENIPUNCTURE: CPT

## 2017-07-24 PROCEDURE — 83735 ASSAY OF MAGNESIUM: CPT

## 2017-07-24 PROCEDURE — 80053 COMPREHEN METABOLIC PANEL: CPT

## 2017-07-24 PROCEDURE — 82977 ASSAY OF GGT: CPT

## 2017-07-24 PROCEDURE — 80197 ASSAY OF TACROLIMUS: CPT

## 2017-07-24 PROCEDURE — 85025 COMPLETE CBC W/AUTO DIFF WBC: CPT

## 2017-07-24 RX ORDER — TADALAFIL 20 MG/1
40 TABLET ORAL
Qty: 180 TAB | Refills: 3 | Status: SHIPPED | OUTPATIENT
Start: 2017-07-24 | End: 2018-07-03 | Stop reason: SDUPTHER

## 2017-07-26 LAB — TACROLIMUS BLD-MCNC: 1.3 NG/ML

## 2017-07-28 ENCOUNTER — TELEPHONE (OUTPATIENT)
Dept: MEDICAL GROUP | Facility: PHYSICIAN GROUP | Age: 57
End: 2017-07-28

## 2017-07-28 DIAGNOSIS — B37.0 ORAL THRUSH: ICD-10-CM

## 2017-07-28 NOTE — TELEPHONE ENCOUNTER
I have sent nystatin oral medication to the pharmacy. I recommend patient contact Dr. Canada to assure that there is no complication with using this medication after liver transplant.

## 2017-07-28 NOTE — TELEPHONE ENCOUNTER
Pt states she still has a sore throat and is not sure if her Thrush in her mouth is gone. Pt requesting a script for mouth wash be sent to her pharmacy. Please advise.

## 2017-07-31 ENCOUNTER — HOSPITAL ENCOUNTER (OUTPATIENT)
Dept: LAB | Facility: MEDICAL CENTER | Age: 57
End: 2017-07-31
Attending: INTERNAL MEDICINE
Payer: MEDICARE

## 2017-07-31 LAB
ALBUMIN SERPL BCP-MCNC: 4.1 G/DL (ref 3.2–4.9)
ALBUMIN/GLOB SERPL: 1.7 G/DL
ALP SERPL-CCNC: 32 U/L (ref 30–99)
ALT SERPL-CCNC: 17 U/L (ref 2–50)
ANION GAP SERPL CALC-SCNC: 8 MMOL/L (ref 0–11.9)
AST SERPL-CCNC: 19 U/L (ref 12–45)
BASOPHILS # BLD AUTO: 0.4 % (ref 0–1.8)
BASOPHILS # BLD: 0.02 K/UL (ref 0–0.12)
BILIRUB SERPL-MCNC: 0.4 MG/DL (ref 0.1–1.5)
BUN SERPL-MCNC: 30 MG/DL (ref 8–22)
CALCIUM SERPL-MCNC: 9.5 MG/DL (ref 8.5–10.5)
CHLORIDE SERPL-SCNC: 101 MMOL/L (ref 96–112)
CO2 SERPL-SCNC: 30 MMOL/L (ref 20–33)
CREAT SERPL-MCNC: 1.2 MG/DL (ref 0.5–1.4)
EOSINOPHIL # BLD AUTO: 0.11 K/UL (ref 0–0.51)
EOSINOPHIL NFR BLD: 2.4 % (ref 0–6.9)
ERYTHROCYTE [DISTWIDTH] IN BLOOD BY AUTOMATED COUNT: 48.4 FL (ref 35.9–50)
GFR SERPL CREATININE-BSD FRML MDRD: 46 ML/MIN/1.73 M 2
GGT SERPL-CCNC: 9 U/L (ref 7–34)
GLOBULIN SER CALC-MCNC: 2.4 G/DL (ref 1.9–3.5)
GLUCOSE SERPL-MCNC: 130 MG/DL (ref 65–99)
HCT VFR BLD AUTO: 38.2 % (ref 37–47)
HGB BLD-MCNC: 12.2 G/DL (ref 12–16)
IMM GRANULOCYTES # BLD AUTO: 0.01 K/UL (ref 0–0.11)
IMM GRANULOCYTES NFR BLD AUTO: 0.2 % (ref 0–0.9)
LYMPHOCYTES # BLD AUTO: 0.83 K/UL (ref 1–4.8)
LYMPHOCYTES NFR BLD: 17.9 % (ref 22–41)
MAGNESIUM SERPL-MCNC: 2.3 MG/DL (ref 1.5–2.5)
MCH RBC QN AUTO: 29.2 PG (ref 27–33)
MCHC RBC AUTO-ENTMCNC: 31.9 G/DL (ref 33.6–35)
MCV RBC AUTO: 91.4 FL (ref 81.4–97.8)
MONOCYTES # BLD AUTO: 0.29 K/UL (ref 0–0.85)
MONOCYTES NFR BLD AUTO: 6.3 % (ref 0–13.4)
NEUTROPHILS # BLD AUTO: 3.38 K/UL (ref 2–7.15)
NEUTROPHILS NFR BLD: 72.8 % (ref 44–72)
NRBC # BLD AUTO: 0 K/UL
NRBC BLD AUTO-RTO: 0 /100 WBC
PLATELET # BLD AUTO: 79 K/UL (ref 164–446)
PMV BLD AUTO: 9.2 FL (ref 9–12.9)
POTASSIUM SERPL-SCNC: 4 MMOL/L (ref 3.6–5.5)
PROT SERPL-MCNC: 6.5 G/DL (ref 6–8.2)
RBC # BLD AUTO: 4.18 M/UL (ref 4.2–5.4)
SODIUM SERPL-SCNC: 139 MMOL/L (ref 135–145)
WBC # BLD AUTO: 4.6 K/UL (ref 4.8–10.8)

## 2017-07-31 PROCEDURE — 82977 ASSAY OF GGT: CPT

## 2017-07-31 PROCEDURE — 83735 ASSAY OF MAGNESIUM: CPT

## 2017-07-31 PROCEDURE — 85025 COMPLETE CBC W/AUTO DIFF WBC: CPT

## 2017-07-31 PROCEDURE — 36415 COLL VENOUS BLD VENIPUNCTURE: CPT

## 2017-07-31 PROCEDURE — 80053 COMPREHEN METABOLIC PANEL: CPT

## 2017-07-31 PROCEDURE — 80197 ASSAY OF TACROLIMUS: CPT

## 2017-08-01 ENCOUNTER — PATIENT OUTREACH (OUTPATIENT)
Dept: HEALTH INFORMATION MANAGEMENT | Facility: OTHER | Age: 57
End: 2017-08-01

## 2017-08-01 LAB — TACROLIMUS BLD-MCNC: 2 NG/ML

## 2017-08-02 RX ORDER — TRAZODONE HYDROCHLORIDE 50 MG/1
TABLET ORAL
Qty: 90 TAB | Refills: 2 | Status: SHIPPED | OUTPATIENT
Start: 2017-08-02 | End: 2017-12-04 | Stop reason: SDUPTHER

## 2017-08-02 RX ORDER — FLUTICASONE PROPIONATE 50 MCG
SPRAY, SUSPENSION (ML) NASAL
Qty: 48 G | Refills: 1 | Status: SHIPPED | OUTPATIENT
Start: 2017-08-02 | End: 2017-12-29 | Stop reason: SDUPTHER

## 2017-08-02 NOTE — PROGRESS NOTES
Patient Roxana Cuba was admitted on 6/22/2017  to 6/26/2017 at [facility] , and discharged with a diagnosis of sepsis and a LACE score of 79. They were discharged to home with instructions to follow up with their PCP within 2 weeks of discharge. Patient was not discharged home with any DME, O2 or HHC.  Upon initial outreach, the patient ultimately decline services with [facility]. Patient’s appointments are as follows:  • 07/14/17 - Dr. Reyes - PCP - appt kept

## 2017-08-04 ENCOUNTER — TELEPHONE (OUTPATIENT)
Dept: MEDICAL GROUP | Facility: PHYSICIAN GROUP | Age: 57
End: 2017-08-04

## 2017-08-04 NOTE — TELEPHONE ENCOUNTER
Spoke with Olga in referral Dept ext. [phone], she advised for me to speak with someone in customer service dept and gave me [phone] when I got a hold of this department they advised me to call [phone] so I was able to leave a voicemail with customer service to appeal denial and we are waiting for a call back.

## 2017-08-04 NOTE — TELEPHONE ENCOUNTER
"----- Message from Bernarda Reyes D.O. sent at 8/4/2017 12:30 PM PDT -----  Regarding: FW:referral  Contact: [phone]  Patient's referral continues to be denied. Can you call [facility] and see why it has been denied and what needs to be done to get it approved.  Dr. Reyes              ----- Message -----     From: Sara Faust, Med Ass't     Sent: 7/31/2017   8:17 AM       To: Bernarda Reyes D.O.  Subject: FW:referral                                          ----- Message -----     From: Roxana Li     Sent: 7/29/2017   8:47 AM       To: [facility]  Subject: RE:referral                                      Dr. Reyes,    Thank you very much for the Rx for the \"swish an swirl\" Nystatin medication for Thrush.  It was actually Dr. Canada's idea that I contact you for this prescription, but I left a VM with his nurse yesterday as well.  Secondly,  thank you so much, from the bottom of my heart, for sticking it out with me regarding this referral to Dr. Barfield at [facility].  [facility] can be very difficult to deal with - and I just want you to know how much I appreciate your efforts.    Roxana Bereket  [phone]  ----- Message -----  From: Bernarda Reyes D.O.  Sent: 7/28/2017  3:59 PM PDT  To: Roxana Li  Subject: RE:referral    Roxana,  I have sent oral nystatin to her pharmacy. I recommend contacting Dr. Canada's office to assure he is not concerned with use of nystatin for one month. We have been working on your referral. As per [facility] they do not have access to my notes. Notes have been printed and faxed.  Dr. Reyes            ----- Message -----     From: ROXANA LI     Sent: 7/19/2017  7:58 AM PDT       To: Bernarda Reyes D.O.  Subject: RE:referral    Thank you Dr. Reyes for the follow up.  I have not heard anything back from [facility].  I have received the clinic notes from Sudarshan Canada MD at [facility] who follows me post-liver transplant.  In his notes he requests a " referral to [facility] for this surgical procedure.  His office will also initiate a referral.  Your office has been sent a copy of the clinic visit notes with Dr. Canada on 06/19, unfortunately they were addressed to Dr. Trinh.  Please confirm receipt of clinic notes.    Roxana Cuba  ----- Message -----  From: Bernarda Reyes D.O.  Sent: 7/18/2017  7:31 PM PDT  To: Roxana Cuba  Subject: referral    Roxana, I'm still waiting to hear back from your insurance if referral has been approved. I'll be out of the office until 7/24/17. I will let you know any information at that time.  Dr. Reyes

## 2017-08-08 ENCOUNTER — OFFICE VISIT (OUTPATIENT)
Dept: CARDIOLOGY | Facility: MEDICAL CENTER | Age: 57
End: 2017-08-08
Payer: MEDICARE

## 2017-08-08 VITALS
DIASTOLIC BLOOD PRESSURE: 70 MMHG | HEART RATE: 80 BPM | HEIGHT: 68 IN | SYSTOLIC BLOOD PRESSURE: 114 MMHG | OXYGEN SATURATION: 94 %

## 2017-08-08 DIAGNOSIS — R16.1 SPLENOMEGALY: ICD-10-CM

## 2017-08-08 DIAGNOSIS — D69.6 THROMBOCYTOPENIA (HCC): ICD-10-CM

## 2017-08-08 DIAGNOSIS — K56.609 SBO (SMALL BOWEL OBSTRUCTION) (HCC): ICD-10-CM

## 2017-08-08 DIAGNOSIS — J96.11 CHRONIC RESPIRATORY FAILURE WITH HYPOXIA (HCC): ICD-10-CM

## 2017-08-08 DIAGNOSIS — D84.9 IMMUNOCOMPROMISED STATE (HCC): ICD-10-CM

## 2017-08-08 DIAGNOSIS — I34.0 MILD MITRAL REGURGITATION: ICD-10-CM

## 2017-08-08 DIAGNOSIS — I35.1 MILD AORTIC REGURGITATION: ICD-10-CM

## 2017-08-08 DIAGNOSIS — Z79.899 CHRONIC PRESCRIPTION BENZODIAZEPINE USE: ICD-10-CM

## 2017-08-08 DIAGNOSIS — D86.9 SARCOIDOSIS: ICD-10-CM

## 2017-08-08 DIAGNOSIS — Z79.891 CHRONIC USE OF OPIATE DRUGS THERAPEUTIC PURPOSES: ICD-10-CM

## 2017-08-08 DIAGNOSIS — J44.9 CHRONIC OBSTRUCTIVE PULMONARY DISEASE, UNSPECIFIED COPD TYPE (HCC): ICD-10-CM

## 2017-08-08 DIAGNOSIS — K76.81 HEPATOPULMONARY SYNDROME (HCC): ICD-10-CM

## 2017-08-08 DIAGNOSIS — Q21.11 OSTIUM SECUNDUM TYPE ATRIAL SEPTAL DEFECT: ICD-10-CM

## 2017-08-08 DIAGNOSIS — N18.30 CKD (CHRONIC KIDNEY DISEASE) STAGE 3, GFR 30-59 ML/MIN (HCC): ICD-10-CM

## 2017-08-08 DIAGNOSIS — Z94.4 STATUS POST LIVER TRANSPLANT (HCC): ICD-10-CM

## 2017-08-08 DIAGNOSIS — I25.2 H/O NON-ST ELEVATION MYOCARDIAL INFARCTION (NSTEMI): ICD-10-CM

## 2017-08-08 DIAGNOSIS — E78.00 PURE HYPERCHOLESTEROLEMIA: ICD-10-CM

## 2017-08-08 PROCEDURE — 99214 OFFICE O/P EST MOD 30 MIN: CPT | Performed by: INTERNAL MEDICINE

## 2017-08-08 ASSESSMENT — ENCOUNTER SYMPTOMS
SPUTUM PRODUCTION: 0
DIZZINESS: 0
PALPITATIONS: 0
GASTROINTESTINAL NEGATIVE: 1
HEMOPTYSIS: 0
CARDIOVASCULAR NEGATIVE: 1
COUGH: 0
SORE THROAT: 0
MUSCULOSKELETAL NEGATIVE: 1
EYES NEGATIVE: 1
NEUROLOGICAL NEGATIVE: 1
SHORTNESS OF BREATH: 0
CONSTITUTIONAL NEGATIVE: 1
WEAKNESS: 0
CHILLS: 0
FEVER: 0
RESPIRATORY NEGATIVE: 1
BRUISES/BLEEDS EASILY: 0
PND: 0
CLAUDICATION: 0
ORTHOPNEA: 0
LOSS OF CONSCIOUSNESS: 0
STRIDOR: 0
WHEEZING: 0

## 2017-08-08 NOTE — PROGRESS NOTES
"Subjective:   Roxana Cuba is a 57 y.o. female who presents today as a follow-up for her pulmonary hypertension and her liver disease. She was recently seen at [facility] where she had PFTs performed showing essentially just a decreased DLCO consistent with pulmonary hypertension but the remainder which was normal. Additionally it was thought that her sarcoid and granulomatous hepatitis was stable. They've decided to keep her on triple immunosuppression because they're concerned about recurrence. Otherwise she's had no changes in her chest pain shortness breath lower extremity edema or exertional capacity.    Past Medical History   Diagnosis Date   • Cirrhosis (CMS-HCC) December 2011     Status post liver transplant at [facility]   • S/P cholecystectomy    • GERD (gastroesophageal reflux disease)          • Psychiatric disorder      Mood disorder   • Fracture of left foot    • Bronchitis       2016   • CKD (chronic kidney disease) stage 3, GFR 30-59 ml/min    • Breath shortness    • Chronic fatigue and malaise    • VRE (vancomycin-resistant Enterococci)      02-17-17, pt declines knowledge of this   • Low back pain 02-17-17     and left foot, 7/10   • Pneumonia      aspiration,    • Mild aortic regurgitation and aortic valve sclerosis     • Splenomegaly    • Small bowel obstruction (CMS-[facility])      01-   • On home oxygen therapy      4 liters, Dr. Gaming   • Pulmonary hypertension (CMS-HCC)         • Diabetes ([facility])      reports hx of, resolved w/weight loss. Reports still checking bloodsugars twice daily and using insulin PRN   • Hypothyroid    • H/O Clostridium difficile infection 02-17-17     reports \"16 months ago\". Current stool sample 01-26-17 neg   • Platelet disorder (CMS-[facility])      low platelets   • Cardiomegaly      Past Surgical History   Procedure Laterality Date   • Pr anesth,nose,sinus surgery     • Latricia by laparoscopy  9/19/2009     Performed by BIRD ABDI at [facility]ERY [facility]   • Exam under anesthesia  11/11/2009     Performed by BIRD ABDI at SURGERY [facility]   • Hysterectomy, total abdominal           • Other       Breast reduction   • Gastroscopy-endo  3/12/2010     Performed by CAMELIA SAMANO at ENDOSCOPY [facility]   • Bronchoscopy-endo  5/29/2012     Performed by SUYAPA CAMARENA at [facility]   • Bronchoscopy-endo  6/19/2012     Performed by MARLENA MURILLO at [facility]   • Sigmoidoscopy flex  9/26/2012     Performed by Kristopher Cardoso M.D. at [facility]   • Recovery  2/27/2013     Performed by [facility] at SURGERY SAME DAY [facility]   • Bronchoscopy-endo  11/15/2013     Performed by Ha Perez M.D. at [facility]   • Recovery  1/21/2014     Performed by [facility] at [facility]ERY [facility]   • Recovery  3/24/2014     Performed by [facility] Surgery at SURGERY [facility] DAY [facility]   • Hemorrhoidectomy  11/11/2009     Performed by BIRD ABDI at SURGERY [facility]   • Gastroscopy  9/28/2012     Performed by Aravind Richards M.D. at SURGERY [facility]   • Carpal tunnel release           • Bone marrow aspiration  12/31/2012     Performed by Josemanuel Real M.D. at [facility]   • Bone marrow biopsy, ndl/trocar  12/31/2012     Performed by Josemanuel Real M.D. at ENDOSCOPY [facility]   • Recovery  3/31/2014     Performed by [facility] at [facility]   • Other abdominal surgery  December 2011     Liver transplant at [facility] by Dr. Canada.   • Bone marrow aspiration  3/16/2015     Performed by Marlena Hi M.D. at ENDOSCOPY [facility]   • Bone marrow biopsy, ndl/trocar  3/16/2015     Performed by Marlena Hi M.D. at [facility]   • Lung biopsy open  11/2016   • Tonsillectomy     • Other abdominal surgery       Gasric Sleeve   • Breast biopsy Left 2/21/2017      Procedure: BREAST BIOPSY - WIRE LOCALIZED EXCISONAL ;  Surgeon: Jane Zhao M.D.;  Location: [facility]ERY [facility];  Service:    • Colonoscopy N/A 3/27/2017     Procedure: COLONOSCOPY;  Surgeon: Osman Matt M.D.;  Location: [facility]ERY [facility];  Service:    • Gastroscopy N/A 3/27/2017     Procedure: GASTROSCOPY;  Surgeon: Osman Matt M.D.;  Location: SURGERY [facility];  Service:      Family History   Problem Relation Age of Onset   • Other Father      Unknown (dead 10 years)   • Diabetes Father    • Heart Disease Father    • Hypertension Father    • Hyperlipidemia Father    • Stroke Father    • Non-contributory Mother    • Hyperlipidemia Mother    • Alcohol/Drug Mother    • Cancer Paternal Aunt    • Alcohol/Drug Maternal Grandmother    • Alcohol/Drug Maternal Grandfather      History   Smoking status   • Passive Smoke Exposure - Never Smoker   Smokeless tobacco   • Never Used     Comment: avoid all tobacco products     Allergies   Allergen Reactions   • Rhubarb Anaphylaxis   • Organic Nitrates      Nitroglycerin products should be avoided with the use of PDE-5 inhibitors as the combination can result in severe hypotension.   • Vancomycin Hcl      Causes red man syndrome when infused to fast       Outpatient Encounter Prescriptions as of 8/8/2017   Medication Sig Dispense Refill   • fluticasone (FLONASE) 50 MCG/ACT nasal spray SPRAY 1 SPRAY IN EACH NOSTRIL TWICE A DAY 48 g 1   • trazodone (DESYREL) 50 MG Tab TAKE 1-2 TABS BY MOUTH EVERY BEDTIME. 90 Tab 2   • nystatin (MYCOSTATIN) 084928 UNIT/ML Suspension Take 5 mL by mouth 4 times a day. 200 mL 0   • Tadalafil, PAH, (ADCIRCA) 20 MG Tab Take 40 mg by mouth every bedtime. Indications: Pulmonary Arterial Hypertension 180 Tab 3   • Linaclotide (LINZESS) 290 MCG Cap Take 290 mcg by mouth.     • Blood Glucose Monitoring Suppl Device Meter: Dispense Device of Insurance Preference. Sig. Use as directed for blood sugar monitoring. #1.  NR. 1 Device 0   • Blood Glucose Monitoring Suppl SUPPLIES Misc Test strips order: Test strips for insurance preferred me meter. Sig: use Qdaily and prn ssx high or low sugar #100 RF x 3 100 Strip 2   • furosemide (LASIX) 40 MG Tab TAKE 1 TAB BY MOUTH EVERY DAY. 30 Tab 6   • azithromycin (ZITHROMAX) 250 MG Tab Take 1 Tab by mouth every day. 4 Tab 0   • ferrous sulfate (FEOSOL) 220 (44 FE) MG/5ML Elixir Take 5 mL by mouth every day. 1 Bottle 10   • Linaclotide 290 MCG Cap Take 290 mg by mouth every day. 90 Cap 3   • CITRACAL MAXIMUM 315-250 MG-UNIT Tab TAKE 2 TABS BY MOUTH 2X/ DAY WITH FOOD BEFORE AND AFTER DINNER FOR LIFE-CRUSH 1ST 3 MONTH, SPACE  Tab 11   • tacrolimus (PROGRAF) 0.5 MG Cap Take 1.5 mg by mouth 2 times a day.     • ascorbic acid (ASCORBIC ACID) 500 MG Tab Take 500 mg by mouth every day.     • sennosides (SENOKOT) 8.6 MG Tab Take 17.2 mg by mouth 2 times a day.     • Probiotic Product (PROBIOTIC DAILY PO) Take 1 Cap by mouth every day.     • aspirin EC (ECOTRIN) 81 MG Tablet Delayed Response Take 1 Tab by mouth every morning. 90 Tab 4   • gabapentin (NEURONTIN) 600 MG tablet Take 1 Tab by mouth 3 times a day. 270 Tab 4   • levothyroxine (SYNTHROID) 137 MCG Tab Take 1 Tab by mouth every day. 90 Tab 4   • omeprazole (PRILOSEC) 40 MG delayed-release capsule Take 1 Cap by mouth every day. 90 Cap 4   • tiotropium (SPIRIVA) 18 MCG Cap Inhale 1 Cap by mouth every day. 90 Cap 4   • predniSONE (DELTASONE) 5 MG Tab Take 7 mg by mouth every morning.     • sertraline (ZOLOFT) 100 MG Tab Take 100 mg by mouth every bedtime.     • ambrisentan (LETAIRIS) 10 MG tablet Take 1 Tab by mouth every day. 30 Tab 11   • docusate sodium (COLACE) 100 MG Cap Take 100 mg by mouth every day.     • mycophenolate (CELLCEPT) 250 MG Cap Take 500 mg by mouth 2 times a day.     • pravastatin (PRAVACHOL) 20 MG Tab Take 1 Tab by mouth every day. 30 Tab 11   • oxycodone immediate release (ROXICODONE) 10 MG immediate release tablet  "Take 1-3 Tabs by mouth every 6 hours as needed for Moderate Pain. 90 Tab 0   • morphine (MS IR) 15 MG tablet Take 1 Tab by mouth every four hours as needed for Severe Pain. 30 Tab 0   • alprazolam (XANAX) 0.5 MG Tab Take 1 Tab by mouth 3 times a day. 90 Tab 2   • cyclobenzaprine (FLEXERIL) 10 MG Tab Take 10 mg by mouth 3 times a day as needed.     • ondansetron (ZOFRAN ODT) 4 MG TABLET DISPERSIBLE Take 1 Tab by mouth every 8 hours as needed for Nausea/Vomiting. Nausea and vomiting 270 Tab 4     No facility-administered encounter medications on file as of 8/8/2017.     Review of Systems   Constitutional: Negative.  Negative for fever, chills and malaise/fatigue.   HENT: Negative.  Negative for sore throat.    Eyes: Negative.    Respiratory: Negative.  Negative for cough, hemoptysis, sputum production, shortness of breath, wheezing and stridor.    Cardiovascular: Negative.  Negative for chest pain, palpitations, orthopnea, claudication, leg swelling and PND.   Gastrointestinal: Negative.    Genitourinary: Negative.    Musculoskeletal: Negative.    Skin: Negative.    Neurological: Negative.  Negative for dizziness, loss of consciousness and weakness.   Endo/Heme/Allergies: Negative.  Does not bruise/bleed easily.   All other systems reviewed and are negative.       Objective:   /70 mmHg  Pulse 80  Ht 1.727 m (5' 8\")  SpO2 94%  LMP 01/03/2000    Physical Exam   Constitutional: She is oriented to person, place, and time. She appears well-developed and well-nourished. No distress.   HENT:   Head: Normocephalic.   Mouth/Throat: Oropharynx is clear and moist.   Eyes: EOM are normal. Pupils are equal, round, and reactive to light. Right eye exhibits no discharge. Left eye exhibits no discharge. No scleral icterus.   Neck: Normal range of motion. Neck supple. No JVD present. No tracheal deviation present.   Cardiovascular: Normal rate, regular rhythm, S1 normal, S2 normal, normal heart sounds, intact distal pulses " and normal pulses.  Exam reveals no gallop, no S3, no S4 and no friction rub.    No murmur heard.   No systolic murmur is present    No diastolic murmur is present   Pulses:       Carotid pulses are 2+ on the right side, and 2+ on the left side.       Radial pulses are 2+ on the right side, and 2+ on the left side.        Dorsalis pedis pulses are 2+ on the right side, and 2+ on the left side.        Posterior tibial pulses are 2+ on the right side, and 2+ on the left side.   Pulmonary/Chest: Effort normal and breath sounds normal. No respiratory distress. She has no wheezes. She has no rales.   Abdominal: Soft. Bowel sounds are normal. She exhibits no distension and no mass. There is no tenderness. There is no rebound and no guarding.   Musculoskeletal: She exhibits no edema.   Neurological: She is alert and oriented to person, place, and time. No cranial nerve deficit.   Skin: Skin is warm and dry. She is not diaphoretic. No pallor.   Psychiatric: She has a normal mood and affect. Her behavior is normal. Judgment and thought content normal.   Nursing note and vitals reviewed.      Assessment:     1. Hepatopulmonary syndrome (CMS-HCC)     2. H/O non-ST elevation myocardial infarction (NSTEMI)     3. Chronic obstructive pulmonary disease, unspecified COPD type (CMS-HCC)     4. CKD (chronic kidney disease) stage 3, GFR 30-59 ml/min- unclear cause, probably multifactorial     5. Chronic use of opiate drugs therapeutic purposes     6. Chronic respiratory failure with hypoxia (CMS-HCC)     7. Chronic prescription benzodiazepine use     8. Splenomegaly     9. Status post liver transplant Dr. Canada ([facility])     10. Thrombocytopenia (CMS-HCC)     11. SBO (small bowel obstruction) (CMS-HCC)     12. Sarcoidosis (CMS-HCC)     13. Pure hypercholesterolemia     14. Ostium secundum type atrial septal defect, S/P percutaneous closure     15. Mild aortic regurgitation and aortic valve sclerosis     16. Mild mitral  regurgitation     17. Immunocompromised state (CMS-HCC)     18. IDDM (insulin dependent diabetes mellitus) (CMS-HCC)         Medical Decision Making:  Today's Assessment / Status / Plan:     57-year-old female with pulmonary hypertension on medications and stable at this point. We've had numerous conversations about the risks and benefits of continuing her medication for her PAH.  I will see her back in 6 months.    1. PAH, WHO 1, WHO2, WHO 3 possible  - cont ambrisentan 10 daily  - cont tidalifil 40 daily  - cont lasix    2. ASD s/p Closure    - clinical f/u    3. Aortic Stenosis, mild     - clinical f/u      Thank for you allowing me to take part in your patient's care, please call should you have any questions or would like to discuss this patient.

## 2017-08-08 NOTE — MR AVS SNAPSHOT
"        Roxana Cuba   2017 10:00 AM   Office Visit   MRN: [id number]    Department:  [facility]   Dept Phone:  [phone]    Description:  Female : 1960   Provider:  Maxwell Phan M.D.           Reason for Visit     Follow-Up           Allergies as of 2017     Allergen Noted Reactions    Rhubarb 2009   Anaphylaxis    Organic Nitrates 2017       Nitroglycerin products should be avoided with the use of PDE-5 inhibitors as the combination can result in severe hypotension.    Vancomycin Hcl 2016       Causes red man syndrome when infused to fast        You were diagnosed with     Hepatopulmonary syndrome (CMS-HCC)   [573.5.ICD-9-CM]       H/O non-ST elevation myocardial infarction (NSTEMI)   [651984]       Chronic obstructive pulmonary disease, unspecified COPD type (CMS-[facility])   [3240958]       CKD (chronic kidney disease) stage 3, GFR 30-59 ml/min   [034434]       Chronic use of opiate drugs therapeutic purposes   [5654995]       Chronic respiratory failure with hypoxia (CMS-[facility])   [166365]       Chronic prescription benzodiazepine use   [8730184]       Splenomegaly   [789.2.ICD-9-CM]       Status post liver transplant (CMS-Former[facility])   [888713]       Thrombocytopenia (CMS-HCC)   [046130]       SBO (small bowel obstruction) (CMS-HCC)   [244675]       Sarcoidosis (CMS-HCC)   [135.ICD-9-CM]       Pure hypercholesterolemia   [272.0.ICD-9-CM]       Ostium secundum type atrial septal defect   [745.5.ICD-9-CM]       Mild aortic regurgitation   [341859]       Mild mitral regurgitation   [287146]       Immunocompromised state (CMS-HCC)   [655610]       IDDM (insulin dependent diabetes mellitus) (CMS-[facility])   [497001]         Vital Signs     Blood Pressure Pulse Height Oxygen Saturation Last Menstrual Period Smoking Status    114/70 mmHg 80 1.727 m (5' 8\") 94% 2000 Passive Smoke Exposure - Never Smoker      Basic Information     Date Of Birth Sex Race Ethnicity Preferred Language  "    1960 Female White Non- English      Your appointments     Aug 09, 2017  8:00 AM   Adult Draw/Collection with [facility]   [facility] (--)    [address] [place] [address]   [phone]            Aug 21, 2017  2:40 PM   Established Patient Pul with A Rotation   [facility] (--)    [address]  [address]  [place] [address]   [phone]            Sep 06, 2017  8:00 AM   Adult Draw/Collection with [facility]   [facility] (--)    [address] [place] [address]   [phone]            Oct 11, 2017  8:00 AM   Adult Draw/Collection with [facility]   [facility] (--)    [address] [place] [address]   [phone]            Oct 20, 2017 10:20 AM   Established Patient with Bernarda Reyes D.O.   [facility])    [address], [address]  [place] [address]   [phone]           You will be receiving a confirmation call a few days before your appointment from our automated call confirmation system.            Nov 08, 2017  8:00 AM   Adult Draw/Collection with [facility]   [facility] (--)    [address] [place] [address]   [phone]            Dec 06, 2017  8:00 AM   Adult Draw/Collection with [facility]   [facility] (--)    [address] [place] [address]   [phone]              Problem List              ICD-10-CM Priority Class Noted - Resolved    Status post liver transplant Dr. [place] ([facility]) Z94.4 Medium  3/13/2012 - Present    Hypothyroidism, adult E03.9 Low  3/13/2012 - Present    History of pancreatitis Z87.19 Medium  6/20/2012 - Present    H/O non-ST elevation myocardial infarction (NSTEMI) I25.2   5/16/2013 - Present    Lower extremity weakness R29.898 Low  6/26/2013 - Present    Anemia D64.9 Medium  9/13/2013 - Present    Anxiety F41.9 Low  11/4/2013 - Present    Insomnia G47.00 Low  11/4/2013 - Present    Sarcoidosis  (CMS-HCC) D86.9 Medium  11/14/2013 - Present    COPD (chronic obstructive pulmonary disease) (CMS-HCC) J44.9 Low  11/14/2013 - Present    MGUS (monoclonal gammopathy of unknown significance) D47.2 Medium  11/14/2013 - Present    CKD (chronic kidney disease) stage 3, GFR 30-59 ml/min- unclear cause, probably multifactorial N18.3 Medium  2/25/2014 - Present    Hepatopulmonary syndrome (CMS-HCC) K76.81 Medium  3/5/2014 - Present    Ostium secundum type atrial septal defect, S/P percutaneous closure Q21.1 Medium  3/17/2014 - Present    Mild aortic regurgitation and aortic valve sclerosis I35.1 High  3/17/2014 - Present    Vitamin D deficiency E55.9 Medium  8/26/2014 - Present    Chronic respiratory failure (CMS-HCC) J96.10 Medium  11/13/2014 - Present    Pure hypercholesterolemia E78.00 High  12/9/2014 - Present    Pulmonary hypertension (CMS-HCC) I27.2 Low  2/3/2015 - Present    On prednisone therapy Z79.52   7/6/2015 - Present    Mild mitral regurgitation I34.0   7/14/2015 - Present    Splenomegaly R16.1   7/14/2015 - Present    Immunocompromised state (CMS-HCC) D84.9   11/14/2015 - Present    Splenic lesion D73.9   11/14/2015 - Present    Hx of gastric bypass Z98.890   1/6/2016 - Present    Back pain M54.9   2/22/2016 - Present    Chronic pain syndrome G89.4   6/15/2016 - Present    Other allergic rhinitis J30.89   7/22/2016 - Present    Thrombocytopenia (CMS-HCC) D69.6   8/15/2016 - Present    GERD (gastroesophageal reflux disease) K21.9   1/4/2017 - Present    Iron deficiency E61.1   1/20/2017 - Present    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 difficile diarrhea A04.7   7/14/2017 - Present    VRE (vancomycin-resistant Enterococci) A49.1, Z16.21   7/14/2017 - Present    Herpes zoster without complication B02.9   7/14/2017 - Present      Health Maintenance        Date Due Completion Dates    IMM INFLUENZA (1) 1/30/2018 (Originally 9/1/2017) 10/1/2016, 9/28/2015, 9/28/2015, 10/14/2014, 10/1/2014, 9/16/2013, 9/16/2013, 9/27/2012, 9/27/2012, 10/10/2011, 10/1/2011, 10/19/2010    IMM HEP B VACCINE (2 of 3 - Primary Series) 8/11/2017 7/14/2017    MAMMOGRAM 2/1/2018 2/1/2017, 12/13/2016, 11/2/2015, 10/20/2014, 8/4/2013 (Done)    Override on 8/4/2013: Done ([facility])    COLONOSCOPY 3/27/2027 3/27/2017, 9/20/2010    IMM DTaP/Tdap/Td Vaccine (2 - Td) 4/12/2027 4/12/2017            Current Immunizations     13-VALENT PCV PREVNAR 8/21/2016  9:40 AM, 8/21/2016    Hepatitis B Vaccine Recombivax (Adol/Adult) 7/14/2017    Influenza TIV (IM) 9/28/2015, 10/14/2014, 9/16/2013 11:43 PM, 9/27/2012 11:57 PM, 10/1/2011    Influenza Vaccine Adult HD 10/1/2016    Influenza Vaccine Quad Inj (Preserved) 9/28/2015, 10/1/2014, 9/16/2013, 9/27/2012, 10/10/2011, 10/19/2010    Pneumococcal polysaccharide vaccine (PPSV-23) 1/4/2017  6:09 AM, 1/4/2017, 10/8/2009  4:25 PM, 10/8/2009, 7/30/2009, 7/30/2009    Tdap Vaccine 4/12/2017      Below and/or attached are the medications your provider expects you to take. Review all of your home medications and newly ordered medications with your provider and/or pharmacist. Follow medication instructions as directed by your provider and/or pharmacist. Please keep your medication list with you and share with your provider. Update the information when medications are discontinued, doses are changed, or new medications (including over-the-counter products) are added; and carry medication information at all times in the event of  emergency situations     Allergies:  RHUBARB - Anaphylaxis     ORGANIC NITRATES - (reactions not documented)     VANCOMYCIN HCL - (reactions not documented)               Medications  Valid as of: August 08, 2017 - 10:28 AM    Generic Name Brand Name Tablet Size Instructions for use    ALPRAZolam (Tab) XANAX 0.5 MG Take 1 Tab by mouth 3 times a day.        Ambrisentan (Tab) LETAIRIS 10 MG Take 1 Tab by mouth every day.        Ascorbic Acid (Tab) ascorbic acid 500 MG Take 500 mg by mouth every day.        Aspirin (Tablet Delayed Response) ECOTRIN 81 MG Take 1 Tab by mouth every morning.        Azithromycin (Tab) ZITHROMAX 250 MG Take 1 Tab by mouth every day.        Blood Glucose Monitoring Suppl (Device) Blood Glucose Monitoring Suppl  Meter: Dispense Device of Insurance Preference. Sig. Use as directed for blood sugar monitoring. #1. NR.        Blood Glucose Monitoring Suppl (Misc) Blood Glucose Monitoring Suppl SUPPLIES Test strips order: Test strips for insurance preferred me meter. Sig: use Qdaily and prn ssx high or low sugar #100 RF x 3        Calcium Citrate-Vitamin D (Tab) CITRACAL MAXIMUM 315-250 MG-UNIT TAKE 2 TABS BY MOUTH 2X/ DAY WITH FOOD BEFORE AND AFTER DINNER FOR LIFE-CRUSH 1ST 3 MONTH, SPACE MVI        Cyclobenzaprine HCl (Tab) FLEXERIL 10 MG Take 10 mg by mouth 3 times a day as needed.        Docusate Sodium (Cap) COLACE 100 MG Take 100 mg by mouth every day.        Ferrous Sulfate (Elixir) FEOSOL 220 (44 FE) MG/5ML Take 5 mL by mouth every day.        Fluticasone Propionate (Suspension) FLONASE 50 MCG/ACT SPRAY 1 SPRAY IN EACH NOSTRIL TWICE A DAY        Furosemide (Tab) LASIX 40 MG TAKE 1 TAB BY MOUTH EVERY DAY.        Gabapentin (Tab) NEURONTIN 600 MG Take 1 Tab by mouth 3 times a day.        Levothyroxine Sodium (Tab) SYNTHROID 137 MCG Take 1 Tab by mouth every day.        Linaclotide (Cap) Linaclotide 290 MCG Take 290 mg by mouth every day.        Linaclotide (Cap) Linaclotide 290 MCG Take 290  mcg by mouth.        Morphine Sulfate (Tab) MS IR 15 MG Take 1 Tab by mouth every four hours as needed for Severe Pain.        Mycophenolate Mofetil (Cap) CELLCEPT 250 MG Take 500 mg by mouth 2 times a day.        Nystatin (Suspension) MYCOSTATIN 886327 UNIT/ML Take 5 mL by mouth 4 times a day.        Omeprazole (CAPSULE DELAYED RELEASE) PRILOSEC 40 MG Take 1 Cap by mouth every day.        Ondansetron (TABLET DISPERSIBLE) ZOFRAN ODT 4 MG Take 1 Tab by mouth every 8 hours as needed for Nausea/Vomiting. Nausea and vomiting        OxyCODONE HCl (Tab) ROXICODONE 10 MG Take 1-3 Tabs by mouth every 6 hours as needed for Moderate Pain.        Pravastatin Sodium (Tab) PRAVACHOL 20 MG Take 1 Tab by mouth every day.        PredniSONE (Tab) DELTASONE 5 MG Take 7 mg by mouth every morning.        Probiotic Product   Take 1 Cap by mouth every day.        Sennosides (Tab) SENOKOT 8.6 MG Take 17.2 mg by mouth 2 times a day.        Sertraline HCl (Tab) ZOLOFT 100 MG Take 100 mg by mouth every bedtime.        Tacrolimus (Cap) PROGRAF 0.5 MG Take 1.5 mg by mouth 2 times a day.        Tadalafil (PAH) (Tab) Tadalafil (PAH) 20 MG Take 40 mg by mouth every bedtime. Indications: Pulmonary Arterial Hypertension        Tiotropium Bromide Monohydrate (Cap) SPIRIVA 18 MCG Inhale 1 Cap by mouth every day.        TraZODone HCl (Tab) DESYREL 50 MG TAKE 1-2 TABS BY MOUTH EVERY BEDTIME.        .                 Medicines prescribed today were sent to:     [facility] #[id number] - [place] - [address]    [address] [place] [address]    Phone: [phone] Fax: [phone]    Open 24 Hours?: No    [facility] [place], [place] - [address]    [address] [facility] [address]    Phone: [phone] Fax: [phone]    Open 24 Hours?: No    [facility] - [place], [place] - [address]    [address] [facility] [address]    Phone: [phone] Fax: [phone]    Open 24  Hours?: No      Medication refill instructions:       If your prescription bottle indicates you have medication refills left, it is not necessary to call your provider’s office. Please contact your pharmacy and they will refill your medication.    If your prescription bottle indicates you do not have any refills left, you may request refills at any time through one of the following ways: The online [employer] system (except Urgent Care), by calling your provider’s office, or by asking your pharmacy to contact your provider’s office with a refill request. Medication refills are processed only during regular business hours and may not be available until the next business day. Your provider may request additional information or to have a follow-up visit with you prior to refilling your medication.   *Please Note: Medication refills are assigned a new Rx number when refilled electronically. Your pharmacy may indicate that no refills were authorized even though a new prescription for the same medication is available at the pharmacy. Please request the medicine by name with the pharmacy before contacting your provider for a refill.           DealPing Access Code: Activation code not generated  Current DealPing Status: Active

## 2017-08-08 NOTE — Clinical Note
"     [facility]   [address], [address]  [place] [address]  Phone: [phone]  Fax: [phone]              Roxana Cuba  1960    Encounter Date: 8/8/2017    Maxwell Phan M.D.          PROGRESS NOTE:  Subjective:   Roxana Cuba is a 57 y.o. female who presents today as a follow-up for her pulmonary hypertension and her liver disease. She was recently seen at [facility] where she had PFTs performed showing essentially just a decreased DLCO consistent with pulmonary hypertension but the remainder which was normal. Additionally it was thought that her sarcoid and granulomatous hepatitis was stable. They've decided to keep her on triple immunosuppression because they're concerned about recurrence. Otherwise she's had no changes in her chest pain shortness breath lower extremity edema or exertional capacity.    Past Medical History   Diagnosis Date   • Cirrhosis (CMS-HCC) December 2011     Status post liver transplant at [facility]   • S/P cholecystectomy    • GERD (gastroesophageal reflux disease)          • Psychiatric disorder      Mood disorder   • Fracture of left foot    • Bronchitis       2016   • CKD (chronic kidney disease) stage 3, GFR 30-59 ml/min    • Breath shortness    • Chronic fatigue and malaise    • VRE (vancomycin-resistant Enterococci)      02-17-17, pt declines knowledge of this   • Low back pain 02-17-17     and left foot, 7/10   • Pneumonia      aspiration,    • Mild aortic regurgitation and aortic valve sclerosis     • Splenomegaly    • Small bowel obstruction (CMS-HCC)      01-   • On home oxygen therapy      4 liters, Dr. Gaming   • Pulmonary hypertension (CMS-HCC)         • Diabetes ([facility])      reports hx of, resolved w/weight loss. Reports still checking bloodsugars twice daily and using insulin PRN   • Hypothyroid    • H/O Clostridium difficile infection 02-17-17     reports \"16 months ago\". Current " stool sample 01-26-17 neg   • Platelet disorder (CMS-HCC)      low platelets   • Cardiomegaly      Past Surgical History   Procedure Laterality Date   • Pr anesth,nose,sinus surgery     • Latricia by laparoscopy  9/19/2009     Performed by BIRD ABDI at [facility]   • Exam under anesthesia  11/11/2009     Performed by BIRD ABDI at SUR[facility]   • Hysterectomy, total abdominal           • Other       Breast reduction   • Gastroscopy-endo  3/12/2010     Performed by CAMELIA SAMANO at ENDOSCOPY [facility]   • Bronchoscopy-endo  5/29/2012     Performed by SUYAPA CAMARENA at ENDOSCOPY [facility]   • Bronchoscopy-endo  6/19/2012     Performed by MARLENA MURILLO at ENDOSCOPY [facility]   • Sigmoidoscopy flex  9/26/2012     Performed by Kristopher Cardoso M.D. at ENDOSCOPY [facility]   • Recovery  2/27/2013     Performed by Ir[facility]Recovery Surgery at SURGERY SAME DAY [facility]   • Bronchoscopy-endo  11/15/2013     Performed by Ha Perez M.D. at ENDOSCOPY [facility]   • Recovery  1/21/2014     Performed by Ir-Recovery Surgery at SURGERY SAME DAY [facility]   • Recovery  3/24/2014     Performed by Cath-Recovery Surgery at SURGERY SAME DAY [facility]   • Hemorrhoidectomy  11/11/2009     Performed by BIRD ABDI at [facility]   • Gastroscopy  9/28/2012     Performed by Aravind Richards M.D. at [facility]   • Carpal tunnel release           • Bone marrow aspiration  12/31/2012     Performed by Josemanuel Real M.D. at ENDOSCOPY [facility]   • Bone marrow biopsy, ndl/trocar  12/31/2012     Performed by Josemanuel Real M.D. at ENDOSCOPY [facility]   • Recovery  3/31/2014     Performed by Ir[facility]Recovery Surgery at [facility]   • Other abdominal surgery  December 2011     Liver transplant at [facility] by Dr. Canada.   • Bone marrow aspiration  3/16/2015     Performed by Marlena Hi M.D. at [facility]   • Bone marrow biopsy, ndl/trocar  3/16/2015     Performed by Freddie Hi M.D. at ENDOSCOPY [facility]   • Lung biopsy open  11/2016   • Tonsillectomy     • Other abdominal surgery       Gasric Sleeve   • Breast biopsy Left 2/21/2017     Procedure: BREAST BIOPSY - WIRE LOCALIZED EXCISONAL ;  Surgeon: Jane Zhao M.D.;  Location: SURGERY SAME DAY [facility];  Service:    • Colonoscopy N/A 3/27/2017     Procedure: COLONOSCOPY;  Surgeon: Osman Matt M.D.;  Location: SURGERY SAME DAY [facility] OR[facility];  Service:    • Gastroscopy N/A 3/27/2017     Procedure: GASTROSCOPY;  Surgeon: Osman Matt M.D.;  Location: SURGERY SAME DAY [facility] OR[facility];  Service:      Family History   Problem Relation Age of Onset   • Other Father      Unknown (dead 10 years)   • Diabetes Father    • Heart Disease Father    • Hypertension Father    • Hyperlipidemia Father    • Stroke Father    • Non-contributory Mother    • Hyperlipidemia Mother    • Alcohol/Drug Mother    • Cancer Paternal Aunt    • Alcohol/Drug Maternal Grandmother    • Alcohol/Drug Maternal Grandfather      History   Smoking status   • Passive Smoke Exposure - Never Smoker   Smokeless tobacco   • Never Used     Comment: avoid all tobacco products     Allergies   Allergen Reactions   • Rhubarb Anaphylaxis   • Organic Nitrates      Nitroglycerin products should be avoided with the use of PDE-5 inhibitors as the combination can result in severe hypotension.   • Vancomycin Hcl      Causes red man syndrome when infused to fast       Outpatient Encounter Prescriptions as of 8/8/2017   Medication Sig Dispense Refill   • fluticasone (FLONASE) 50 MCG/ACT nasal spray SPRAY 1 SPRAY IN EACH NOSTRIL TWICE A DAY 48 g 1   • trazodone (DESYREL) 50 MG Tab TAKE 1-2 TABS BY MOUTH EVERY BEDTIME. 90 Tab 2   • nystatin (MYCOSTATIN) 435757 UNIT/ML Suspension Take 5 mL by mouth 4 times a day. 200 mL 0   • Tadalafil, PAH, (ADCIRCA) 20 MG Tab Take 40 mg by mouth every  bedtime. Indications: Pulmonary Arterial Hypertension 180 Tab 3   • Linaclotide (LINZESS) 290 MCG Cap Take 290 mcg by mouth.     • Blood Glucose Monitoring Suppl Device Meter: Dispense Device of Insurance Preference. Sig. Use as directed for blood sugar monitoring. #1. NR. 1 Device 0   • Blood Glucose Monitoring Suppl SUPPLIES Misc Test strips order: Test strips for insurance preferred me meter. Sig: use Qdaily and prn ssx high or low sugar #100 RF x 3 100 Strip 2   • furosemide (LASIX) 40 MG Tab TAKE 1 TAB BY MOUTH EVERY DAY. 30 Tab 6   • azithromycin (ZITHROMAX) 250 MG Tab Take 1 Tab by mouth every day. 4 Tab 0   • ferrous sulfate (FEOSOL) 220 (44 FE) MG/5ML Elixir Take 5 mL by mouth every day. 1 Bottle 10   • Linaclotide 290 MCG Cap Take 290 mg by mouth every day. 90 Cap 3   • CITRACAL MAXIMUM 315-250 MG-UNIT Tab TAKE 2 TABS BY MOUTH 2X/ DAY WITH FOOD BEFORE AND AFTER DINNER FOR LIFE-CRUSH 1ST 3 MONTH, SPACE  Tab 11   • tacrolimus (PROGRAF) 0.5 MG Cap Take 1.5 mg by mouth 2 times a day.     • ascorbic acid (ASCORBIC ACID) 500 MG Tab Take 500 mg by mouth every day.     • sennosides (SENOKOT) 8.6 MG Tab Take 17.2 mg by mouth 2 times a day.     • Probiotic Product (PROBIOTIC DAILY PO) Take 1 Cap by mouth every day.     • aspirin EC (ECOTRIN) 81 MG Tablet Delayed Response Take 1 Tab by mouth every morning. 90 Tab 4   • gabapentin (NEURONTIN) 600 MG tablet Take 1 Tab by mouth 3 times a day. 270 Tab 4   • levothyroxine (SYNTHROID) 137 MCG Tab Take 1 Tab by mouth every day. 90 Tab 4   • omeprazole (PRILOSEC) 40 MG delayed-release capsule Take 1 Cap by mouth every day. 90 Cap 4   • tiotropium (SPIRIVA) 18 MCG Cap Inhale 1 Cap by mouth every day. 90 Cap 4   • predniSONE (DELTASONE) 5 MG Tab Take 7 mg by mouth every morning.     • sertraline (ZOLOFT) 100 MG Tab Take 100 mg by mouth every bedtime.     • ambrisentan (LETAIRIS) 10 MG tablet Take 1 Tab by mouth every day. 30 Tab 11   • docusate sodium (COLACE) 100 MG  "Cap Take 100 mg by mouth every day.     • mycophenolate (CELLCEPT) 250 MG Cap Take 500 mg by mouth 2 times a day.     • pravastatin (PRAVACHOL) 20 MG Tab Take 1 Tab by mouth every day. 30 Tab 11   • oxycodone immediate release (ROXICODONE) 10 MG immediate release tablet Take 1-3 Tabs by mouth every 6 hours as needed for Moderate Pain. 90 Tab 0   • morphine (MS IR) 15 MG tablet Take 1 Tab by mouth every four hours as needed for Severe Pain. 30 Tab 0   • alprazolam (XANAX) 0.5 MG Tab Take 1 Tab by mouth 3 times a day. 90 Tab 2   • cyclobenzaprine (FLEXERIL) 10 MG Tab Take 10 mg by mouth 3 times a day as needed.     • ondansetron (ZOFRAN ODT) 4 MG TABLET DISPERSIBLE Take 1 Tab by mouth every 8 hours as needed for Nausea/Vomiting. Nausea and vomiting 270 Tab 4     No facility-administered encounter medications on file as of 8/8/2017.     Review of Systems   Constitutional: Negative.  Negative for fever, chills and malaise/fatigue.   HENT: Negative.  Negative for sore throat.    Eyes: Negative.    Respiratory: Negative.  Negative for cough, hemoptysis, sputum production, shortness of breath, wheezing and stridor.    Cardiovascular: Negative.  Negative for chest pain, palpitations, orthopnea, claudication, leg swelling and PND.   Gastrointestinal: Negative.    Genitourinary: Negative.    Musculoskeletal: Negative.    Skin: Negative.    Neurological: Negative.  Negative for dizziness, loss of consciousness and weakness.   Endo/Heme/Allergies: Negative.  Does not bruise/bleed easily.   All other systems reviewed and are negative.       Objective:   /70 mmHg  Pulse 80  Ht 1.727 m (5' 8\")  SpO2 94%  LMP 01/03/2000    Physical Exam   Constitutional: She is oriented to person, place, and time. She appears well-developed and well-nourished. No distress.   HENT:   Head: Normocephalic.   Mouth/Throat: Oropharynx is clear and moist.   Eyes: EOM are normal. Pupils are equal, round, and reactive to light. Right eye exhibits " no discharge. Left eye exhibits no discharge. No scleral icterus.   Neck: Normal range of motion. Neck supple. No JVD present. No tracheal deviation present.   Cardiovascular: Normal rate, regular rhythm, S1 normal, S2 normal, normal heart sounds, intact distal pulses and normal pulses.  Exam reveals no gallop, no S3, no S4 and no friction rub.    No murmur heard.   No systolic murmur is present    No diastolic murmur is present   Pulses:       Carotid pulses are 2+ on the right side, and 2+ on the left side.       Radial pulses are 2+ on the right side, and 2+ on the left side.        Dorsalis pedis pulses are 2+ on the right side, and 2+ on the left side.        Posterior tibial pulses are 2+ on the right side, and 2+ on the left side.   Pulmonary/Chest: Effort normal and breath sounds normal. No respiratory distress. She has no wheezes. She has no rales.   Abdominal: Soft. Bowel sounds are normal. She exhibits no distension and no mass. There is no tenderness. There is no rebound and no guarding.   Musculoskeletal: She exhibits no edema.   Neurological: She is alert and oriented to person, place, and time. No cranial nerve deficit.   Skin: Skin is warm and dry. She is not diaphoretic. No pallor.   Psychiatric: She has a normal mood and affect. Her behavior is normal. Judgment and thought content normal.   Nursing note and vitals reviewed.      Assessment:     1. Hepatopulmonary syndrome (CMS-HCC)     2. H/O non-ST elevation myocardial infarction (NSTEMI)     3. Chronic obstructive pulmonary disease, unspecified COPD type (CMS-HCC)     4. CKD (chronic kidney disease) stage 3, GFR 30-59 ml/min- unclear cause, probably multifactorial     5. Chronic use of opiate drugs therapeutic purposes     6. Chronic respiratory failure with hypoxia (CMS-HCC)     7. Chronic prescription benzodiazepine use     8. Splenomegaly     9. Status post liver transplant Dr. Canada ([facility])     10. Thrombocytopenia (CMS-HCC)     11.  SBO (small bowel obstruction) (CMS-HCC)     12. Sarcoidosis (CMS-HCC)     13. Pure hypercholesterolemia     14. Ostium secundum type atrial septal defect, S/P percutaneous closure     15. Mild aortic regurgitation and aortic valve sclerosis     16. Mild mitral regurgitation     17. Immunocompromised state (CMS-HCC)     18. IDDM (insulin dependent diabetes mellitus) (CMS-HCC)         Medical Decision Making:  Today's Assessment / Status / Plan:     57-year-old female with pulmonary hypertension on medications and stable at this point. We've had numerous conversations about the risks and benefits of continuing her medication for her PAH.  I will see her back in 6 months.    1. PAH, WHO 1, WHO2, WHO 3 possible  - cont ambrisentan 10 daily  - cont tidalifil 40 daily  - cont lasix    2. ASD s/p Closure    - clinical f/u    3. Aortic Stenosis, mild     - clinical f/u      Thank for you allowing me to take part in your patient's care, please call should you have any questions or would like to discuss this patient.      DEMETRIS [email or web address]  [address]  [address]  [facility] [address]  VIA In Basket

## 2017-08-09 ENCOUNTER — HOSPITAL ENCOUNTER (OUTPATIENT)
Dept: LAB | Facility: MEDICAL CENTER | Age: 57
End: 2017-08-09
Attending: INTERNAL MEDICINE
Payer: MEDICARE

## 2017-08-09 LAB
ALBUMIN SERPL BCP-MCNC: 4.2 G/DL (ref 3.2–4.9)
ALBUMIN/GLOB SERPL: 1.8 G/DL
ALP SERPL-CCNC: 26 U/L (ref 30–99)
ALT SERPL-CCNC: 15 U/L (ref 2–50)
ANION GAP SERPL CALC-SCNC: 6 MMOL/L (ref 0–11.9)
AST SERPL-CCNC: 20 U/L (ref 12–45)
BASOPHILS # BLD AUTO: 0.6 % (ref 0–1.8)
BASOPHILS # BLD: 0.02 K/UL (ref 0–0.12)
BILIRUB SERPL-MCNC: 0.4 MG/DL (ref 0.1–1.5)
BUN SERPL-MCNC: 22 MG/DL (ref 8–22)
CALCIUM SERPL-MCNC: 10.1 MG/DL (ref 8.5–10.5)
CHLORIDE SERPL-SCNC: 102 MMOL/L (ref 96–112)
CO2 SERPL-SCNC: 31 MMOL/L (ref 20–33)
CREAT SERPL-MCNC: 1.1 MG/DL (ref 0.5–1.4)
EOSINOPHIL # BLD AUTO: 0.16 K/UL (ref 0–0.51)
EOSINOPHIL NFR BLD: 4.5 % (ref 0–6.9)
ERYTHROCYTE [DISTWIDTH] IN BLOOD BY AUTOMATED COUNT: 46.2 FL (ref 35.9–50)
GFR SERPL CREATININE-BSD FRML MDRD: 51 ML/MIN/1.73 M 2
GGT SERPL-CCNC: 8 U/L (ref 7–34)
GLOBULIN SER CALC-MCNC: 2.3 G/DL (ref 1.9–3.5)
GLUCOSE SERPL-MCNC: 92 MG/DL (ref 65–99)
HCT VFR BLD AUTO: 38.9 % (ref 37–47)
HGB BLD-MCNC: 12.6 G/DL (ref 12–16)
IMM GRANULOCYTES # BLD AUTO: 0.01 K/UL (ref 0–0.11)
IMM GRANULOCYTES NFR BLD AUTO: 0.3 % (ref 0–0.9)
LYMPHOCYTES # BLD AUTO: 1.28 K/UL (ref 1–4.8)
LYMPHOCYTES NFR BLD: 35.8 % (ref 22–41)
MAGNESIUM SERPL-MCNC: 1.9 MG/DL (ref 1.5–2.5)
MCH RBC QN AUTO: 29.5 PG (ref 27–33)
MCHC RBC AUTO-ENTMCNC: 32.4 G/DL (ref 33.6–35)
MCV RBC AUTO: 91.1 FL (ref 81.4–97.8)
MONOCYTES # BLD AUTO: 0.32 K/UL (ref 0–0.85)
MONOCYTES NFR BLD AUTO: 8.9 % (ref 0–13.4)
NEUTROPHILS # BLD AUTO: 1.79 K/UL (ref 2–7.15)
NEUTROPHILS NFR BLD: 49.9 % (ref 44–72)
NRBC # BLD AUTO: 0 K/UL
NRBC BLD AUTO-RTO: 0 /100 WBC
PLATELET # BLD AUTO: 85 K/UL (ref 164–446)
PMV BLD AUTO: 9.8 FL (ref 9–12.9)
POTASSIUM SERPL-SCNC: 3.6 MMOL/L (ref 3.6–5.5)
PROT SERPL-MCNC: 6.5 G/DL (ref 6–8.2)
RBC # BLD AUTO: 4.27 M/UL (ref 4.2–5.4)
SODIUM SERPL-SCNC: 139 MMOL/L (ref 135–145)
WBC # BLD AUTO: 3.6 K/UL (ref 4.8–10.8)

## 2017-08-09 PROCEDURE — 82977 ASSAY OF GGT: CPT

## 2017-08-09 PROCEDURE — 80053 COMPREHEN METABOLIC PANEL: CPT

## 2017-08-09 PROCEDURE — 83735 ASSAY OF MAGNESIUM: CPT

## 2017-08-09 PROCEDURE — 80197 ASSAY OF TACROLIMUS: CPT

## 2017-08-09 PROCEDURE — 85025 COMPLETE CBC W/AUTO DIFF WBC: CPT

## 2017-08-09 PROCEDURE — 36415 COLL VENOUS BLD VENIPUNCTURE: CPT

## 2017-08-10 LAB — TACROLIMUS BLD-MCNC: 2.4 NG/ML

## 2017-08-10 NOTE — PROGRESS NOTES
Roxana Cuba was discharged from [facility] on 3/27/17 with a LACE score of 32. She was discharged home with instructions to follow up with PCP, Cardiology, and Laboratory services. Pt was also discharged with Oxygen that was received 5/20. Pt kept all follow up appointments. [facility]’s patient advocate was able to engage with patient several time upon discharge, and confirmed that all medications were filled upon discharge. Please see patient’s appointments below.  • Cardiology Dr Phan scheduled 4/3/17 - Kept  • Laboratory Services scheduled 4/5/2017 - Kept   • PCP Dr Reyes scheduled 4/12/17 - Kept

## 2017-08-12 ENCOUNTER — PATIENT MESSAGE (OUTPATIENT)
Dept: MEDICAL GROUP | Facility: PHYSICIAN GROUP | Age: 57
End: 2017-08-12

## 2017-08-12 DIAGNOSIS — J44.9 CHRONIC OBSTRUCTIVE PULMONARY DISEASE, UNSPECIFIED COPD TYPE (HCC): ICD-10-CM

## 2017-08-12 DIAGNOSIS — J96.11 CHRONIC RESPIRATORY FAILURE WITH HYPOXIA (HCC): ICD-10-CM

## 2017-08-12 DIAGNOSIS — Z94.4 STATUS POST LIVER TRANSPLANT (HCC): ICD-10-CM

## 2017-08-12 DIAGNOSIS — I35.1 MILD AORTIC REGURGITATION: ICD-10-CM

## 2017-08-12 DIAGNOSIS — R00.1 BRADYCARDIA: ICD-10-CM

## 2017-08-12 DIAGNOSIS — I27.20 PULMONARY HYPERTENSION (HCC): ICD-10-CM

## 2017-08-12 DIAGNOSIS — Z87.19 HISTORY OF PANCREATITIS: ICD-10-CM

## 2017-08-12 DIAGNOSIS — D86.9 SARCOIDOSIS: ICD-10-CM

## 2017-08-12 DIAGNOSIS — K76.81 HEPATOPULMONARY SYNDROME (HCC): ICD-10-CM

## 2017-08-12 DIAGNOSIS — I25.2 H/O NON-ST ELEVATION MYOCARDIAL INFARCTION (NSTEMI): ICD-10-CM

## 2017-08-21 ENCOUNTER — OFFICE VISIT (OUTPATIENT)
Dept: PULMONOLOGY | Facility: HOSPICE | Age: 57
End: 2017-08-21
Payer: MEDICARE

## 2017-08-21 VITALS
RESPIRATION RATE: 15 BRPM | HEART RATE: 64 BPM | OXYGEN SATURATION: 98 % | DIASTOLIC BLOOD PRESSURE: 64 MMHG | WEIGHT: 173.6 LBS | TEMPERATURE: 98.1 F | SYSTOLIC BLOOD PRESSURE: 114 MMHG | BODY MASS INDEX: 26.31 KG/M2 | HEIGHT: 68 IN

## 2017-08-21 DIAGNOSIS — K76.81 HEPATOPULMONARY SYNDROME (HCC): ICD-10-CM

## 2017-08-21 DIAGNOSIS — D86.9 SARCOIDOSIS: ICD-10-CM

## 2017-08-21 DIAGNOSIS — R09.02 HYPOXEMIA: ICD-10-CM

## 2017-08-21 DIAGNOSIS — I27.20 PULMONARY HYPERTENSION (HCC): ICD-10-CM

## 2017-08-21 DIAGNOSIS — Z94.4 STATUS POST LIVER TRANSPLANT (HCC): ICD-10-CM

## 2017-08-21 DIAGNOSIS — D84.9 IMMUNOCOMPROMISED STATE (HCC): ICD-10-CM

## 2017-08-21 DIAGNOSIS — G47.31 CENTRAL SLEEP APNEA: ICD-10-CM

## 2017-08-21 PROCEDURE — 99214 OFFICE O/P EST MOD 30 MIN: CPT | Performed by: INTERNAL MEDICINE

## 2017-08-22 NOTE — PROGRESS NOTES
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 She has a very complex history. She has a history of biopsy-proven sarcoidosis. She also has a history of cirrhosis status post liver transplantation in December 2011 with chronic immunosuppression. Her medications include mycophenolate, tacrolimus, and prednisone at 7 mg per day. She has a history of hepatopulmonary syndrome with pulmonary hypertension and possible microvascular shunting. She has had an ASD repair that was done percutaneously. She has been hospitalized multiple times for various infections including pneumonia and tracheobronchitis. There is possibility that some of these events are due to microaspiration. She is on supplemental oxygen in the range of 5-6 L/m via nasal cannula. She has a history of complex sleep apnea. She has been treated with auto SV. She has a history of pulmonary hypertension with improvement on letairis and tadalafil. She has had previous bariatric surgery.  She was hospitalized at [facility]. She had bronchoscopy and eventually a wedge biopsy of the right lung which was initially interpreted at [facility] and [facility] as consistent with bronchiolitis obliterans. She has been followed by Dr. Arango at [facility] for her sarcoidosis. Her case was presented at a multidisciplinary conference and it was felt that the pathology was not consistent with bronchiolitis obliterans or any other interstitial lung disease. She recommended to continue follow-up with pulmonary function testing and CT scanning as indicated.  A shunt evaluation at [facility] indicated a 14% shunt..    Because of the possibility of microaspiration she has been on Prilosec. She actually feels much better over the past several months. She was hospitalized briefly with a small bowel obstruction in January of this year. This was treated conservatively with an NG tube and resolved.  "She also had a breast biopsy recently which was benign.    She was not able to travel to [facility] this summer because of repeat hospitalization with sepsis. Her oxygen needs here in [place] on range of motion 5-6 L/m. When she does go to the [place] Area she was able to reduce her O2 supplementation to about 3 L/m. When she drives over [place] she finds that she needs to increase her oxygen to about 7 L/m to maintain adequate saturations.   Today she is at baseline and is comfortable on supplemental oxygen. She is in no acute distress.    Past Medical History   Diagnosis Date   • Cirrhosis (CMS-HCC) December 2011     Status post liver transplant at [facility]   • S/P cholecystectomy    • GERD (gastroesophageal reflux disease)          • Psychiatric disorder      Mood disorder   • Fracture of left foot    • Bronchitis       2016   • CKD (chronic kidney disease) stage 3, GFR 30-59 ml/min    • Breath shortness    • Chronic fatigue and malaise    • VRE (vancomycin-resistant Enterococci)      02-17-17, pt declines knowledge of this   • Low back pain 02-17-17     and left foot, 7/10   • Pneumonia      aspiration,    • Mild aortic regurgitation and aortic valve sclerosis     • Splenomegaly    • Small bowel obstruction (CMS-HCC)      01-   • On home oxygen therapy      4 liters, Dr. Gaming   • Pulmonary hypertension (CMS-HCC)         • Diabetes ([facility])      reports hx of, resolved w/weight loss. Reports still checking bloodsugars twice daily and using insulin PRN   • Hypothyroid    • H/O Clostridium difficile infection 02-17-17     reports \"16 months ago\". Current stool sample 01-26-17 neg   • Platelet disorder (CMS-HCC)      low platelets   • Cardiomegaly        ROS:   Constitutional: Denies fevers, chills, night sweats. Chronic fatigue.  Eyes: Denies vision loss, pain, drainage, double vision  Ears, Nose, Throat: Denies earache, tinnitus, hoarseness  Cardiovascular: No palpitations or angina  Respiratory: " Chronic dyspnea. Now has right-sided chest pain after thoracotomy.  Sleep: Has obstructive sleep apnea, on ASV  GI: Denies abdominal pain, nausea, vomiting, diarrhea  : Denies frequent urination, hematuria, painful urination  Musculoskeletal: Denies back pain, painful joints, sore muscles  Neurological: Denies headaches, seizures  Skin: Denies rashes, color changes  Psychiatric: Denies depression or thoughts of suicide. Has anxiety over her medical situation  Hematologic: Denies bleeding tendency or clotting tendency  Allergic/Immunologic: Denies rhinitis, skin sensitivity    Social History     Social History   • Marital Status:      Spouse Name: N/A   • Number of Children: N/A   • Years of Education: N/A     Occupational History   • Not on file.     Social History Main Topics   • Smoking status: Passive Smoke Exposure - Never Smoker   • Smokeless tobacco: Never Used      Comment: avoid all tobacco products   • Alcohol Use: No      Comment: 05/2009 quit drinking   • Drug Use: No   • Sexual Activity: Not on file     Other Topics Concern   • Not on file     Social History Narrative     Rhubarb; Organic nitrates; and Vancomycin hcl  Current Outpatient Prescriptions on File Prior to Visit   Medication Sig Dispense Refill   • fluticasone (FLONASE) 50 MCG/ACT nasal spray SPRAY 1 SPRAY IN EACH NOSTRIL TWICE A DAY 48 g 1   • trazodone (DESYREL) 50 MG Tab TAKE 1-2 TABS BY MOUTH EVERY BEDTIME. 90 Tab 2   • nystatin (MYCOSTATIN) 013121 UNIT/ML Suspension Take 5 mL by mouth 4 times a day. 200 mL 0   • Tadalafil, PAH, (ADCIRCA) 20 MG Tab Take 40 mg by mouth every bedtime. Indications: Pulmonary Arterial Hypertension 180 Tab 3   • Linaclotide (LINZESS) 290 MCG Cap Take 290 mcg by mouth.     • oxycodone immediate release (ROXICODONE) 10 MG immediate release tablet Take 1-3 Tabs by mouth every 6 hours as needed for Moderate Pain. 90 Tab 0   • morphine (MS IR) 15 MG tablet Take 1 Tab by mouth every four hours as needed for  Severe Pain. 30 Tab 0   • alprazolam (XANAX) 0.5 MG Tab Take 1 Tab by mouth 3 times a day. 90 Tab 2   • Blood Glucose Monitoring Suppl Device Meter: Dispense Device of Insurance Preference. Sig. Use as directed for blood sugar monitoring. #1. NR. 1 Device 0   • Blood Glucose Monitoring Suppl SUPPLIES Misc Test strips order: Test strips for insurance preferred me meter. Sig: use Qdaily and prn ssx high or low sugar #100 RF x 3 100 Strip 2   • furosemide (LASIX) 40 MG Tab TAKE 1 TAB BY MOUTH EVERY DAY. 30 Tab 6   • azithromycin (ZITHROMAX) 250 MG Tab Take 1 Tab by mouth every day. 4 Tab 0   • cyclobenzaprine (FLEXERIL) 10 MG Tab Take 10 mg by mouth 3 times a day as needed.     • ferrous sulfate (FEOSOL) 220 (44 FE) MG/5ML Elixir Take 5 mL by mouth every day. 1 Bottle 10   • Linaclotide 290 MCG Cap Take 290 mg by mouth every day. 90 Cap 3   • CITRACAL MAXIMUM 315-250 MG-UNIT Tab TAKE 2 TABS BY MOUTH 2X/ DAY WITH FOOD BEFORE AND AFTER DINNER FOR LIFE-CRUSH 1ST 3 MONTH, SPACE  Tab 11   • tacrolimus (PROGRAF) 0.5 MG Cap Take 1.5 mg by mouth 2 times a day.     • ascorbic acid (ASCORBIC ACID) 500 MG Tab Take 500 mg by mouth every day.     • sennosides (SENOKOT) 8.6 MG Tab Take 17.2 mg by mouth 2 times a day.     • Probiotic Product (PROBIOTIC DAILY PO) Take 1 Cap by mouth every day.     • aspirin EC (ECOTRIN) 81 MG Tablet Delayed Response Take 1 Tab by mouth every morning. 90 Tab 4   • gabapentin (NEURONTIN) 600 MG tablet Take 1 Tab by mouth 3 times a day. 270 Tab 4   • levothyroxine (SYNTHROID) 137 MCG Tab Take 1 Tab by mouth every day. 90 Tab 4   • omeprazole (PRILOSEC) 40 MG delayed-release capsule Take 1 Cap by mouth every day. 90 Cap 4   • ondansetron (ZOFRAN ODT) 4 MG TABLET DISPERSIBLE Take 1 Tab by mouth every 8 hours as needed for Nausea/Vomiting. Nausea and vomiting 270 Tab 4   • tiotropium (SPIRIVA) 18 MCG Cap Inhale 1 Cap by mouth every day. 90 Cap 4   • predniSONE (DELTASONE) 5 MG Tab Take 7 mg by mouth  "every morning.     • sertraline (ZOLOFT) 100 MG Tab Take 100 mg by mouth every bedtime.     • ambrisentan (LETAIRIS) 10 MG tablet Take 1 Tab by mouth every day. 30 Tab 11   • docusate sodium (COLACE) 100 MG Cap Take 100 mg by mouth every day.     • mycophenolate (CELLCEPT) 250 MG Cap Take 500 mg by mouth 2 times a day.     • pravastatin (PRAVACHOL) 20 MG Tab Take 1 Tab by mouth every day. 30 Tab 11     No current facility-administered medications on file prior to visit.     Blood pressure 114/64, pulse 64, temperature 36.7 °C (98.1 °F), resp. rate 15, height 1.727 m (5' 7.99\"), weight 78.744 kg (173 lb 9.6 oz), last menstrual period 01/03/2000, SpO2 98 %.  Family History   Problem Relation Age of Onset   • Other Father      Unknown (dead 10 years)   • Diabetes Father    • Heart Disease Father    • Hypertension Father    • Hyperlipidemia Father    • Stroke Father    • Non-contributory Mother    • Hyperlipidemia Mother    • Alcohol/Drug Mother    • Cancer Paternal Aunt    • Alcohol/Drug Maternal Grandmother    • Alcohol/Drug Maternal Grandfather        Physical Exam:    No distress at rest on supplemental oxygen. She speaks in full complete sentences.  HEENT: PERRLA, EOMI, no scleral icterus, no nasal or oral lesions  Neck: No thyromegaly, no adenopathy, no bruits  Mallampatti: Grade II  Lungs: Equal breath sounds, no wheezes or crackles. She does not have breath sounds that suggest bronchiolitis.  Chest wall: Healing thoracotomy scar. No evidence of infection.  Heart: Regular rate and rhythm, no gallops or murmurs  Abdomen: Soft, benign, no organomegaly  Extremities: No clubbing, cyanosis, or edema  Neurologic: Intact    1. Hypoxemia    2. Pulmonary hypertension (CMS-HCC)    3. Status post liver transplant Dr. Canada ([facility])    4. Sarcoidosis (CMS-HCC)    5. Hepatopulmonary syndrome (CMS-HCC)    6. Immunocompromised state (CMS-HCC)    7. Central sleep apnea        We will make no change in her current " regimen.  She continues on supplemental oxygen.  Letairis and tadalafil will be continued  She will continue mycophenolate, Prograf, and prednisone at current dosage  We will see her in follow-up in 6 months or sooner if there are issues

## 2017-08-25 ENCOUNTER — HOSPITAL ENCOUNTER (OUTPATIENT)
Dept: LAB | Facility: MEDICAL CENTER | Age: 57
End: 2017-08-25
Attending: INTERNAL MEDICINE
Payer: MEDICARE

## 2017-08-25 LAB
ALP SERPL-CCNC: 25 U/L (ref 30–99)
ALT SERPL-CCNC: 14 U/L (ref 2–50)
ANION GAP SERPL CALC-SCNC: 6 MMOL/L (ref 0–11.9)
AST SERPL-CCNC: 16 U/L (ref 12–45)
BASOPHILS # BLD AUTO: 0.6 % (ref 0–1.8)
BASOPHILS # BLD: 0.02 K/UL (ref 0–0.12)
BILIRUB SERPL-MCNC: 0.4 MG/DL (ref 0.1–1.5)
BNP SERPL-MCNC: 55 PG/ML (ref 0–100)
BUN SERPL-MCNC: 17 MG/DL (ref 8–22)
CALCIUM SERPL-MCNC: 9.2 MG/DL (ref 8.5–10.5)
CHLORIDE SERPL-SCNC: 104 MMOL/L (ref 96–112)
CO2 SERPL-SCNC: 31 MMOL/L (ref 20–33)
CREAT SERPL-MCNC: 1.04 MG/DL (ref 0.5–1.4)
EOSINOPHIL # BLD AUTO: 0.12 K/UL (ref 0–0.51)
EOSINOPHIL NFR BLD: 3.7 % (ref 0–6.9)
ERYTHROCYTE [DISTWIDTH] IN BLOOD BY AUTOMATED COUNT: 44.5 FL (ref 35.9–50)
GFR SERPL CREATININE-BSD FRML MDRD: 55 ML/MIN/1.73 M 2
GLUCOSE SERPL-MCNC: 89 MG/DL (ref 65–99)
HCT VFR BLD AUTO: 37.7 % (ref 37–47)
HGB BLD-MCNC: 12.1 G/DL (ref 12–16)
IMM GRANULOCYTES # BLD AUTO: 0.01 K/UL (ref 0–0.11)
IMM GRANULOCYTES NFR BLD AUTO: 0.3 % (ref 0–0.9)
LYMPHOCYTES # BLD AUTO: 1.22 K/UL (ref 1–4.8)
LYMPHOCYTES NFR BLD: 37.3 % (ref 22–41)
MCH RBC QN AUTO: 29.1 PG (ref 27–33)
MCHC RBC AUTO-ENTMCNC: 32.1 G/DL (ref 33.6–35)
MCV RBC AUTO: 90.6 FL (ref 81.4–97.8)
MONOCYTES # BLD AUTO: 0.25 K/UL (ref 0–0.85)
MONOCYTES NFR BLD AUTO: 7.6 % (ref 0–13.4)
NEUTROPHILS # BLD AUTO: 1.65 K/UL (ref 2–7.15)
NEUTROPHILS NFR BLD: 50.5 % (ref 44–72)
NRBC # BLD AUTO: 0 K/UL
NRBC BLD AUTO-RTO: 0 /100 WBC
PLATELET # BLD AUTO: 70 K/UL (ref 164–446)
PMV BLD AUTO: 9.3 FL (ref 9–12.9)
POTASSIUM SERPL-SCNC: 3.9 MMOL/L (ref 3.6–5.5)
RBC # BLD AUTO: 4.16 M/UL (ref 4.2–5.4)
SODIUM SERPL-SCNC: 141 MMOL/L (ref 135–145)
WBC # BLD AUTO: 3.3 K/UL (ref 4.8–10.8)

## 2017-08-25 PROCEDURE — 84450 TRANSFERASE (AST) (SGOT): CPT

## 2017-08-25 PROCEDURE — 80048 BASIC METABOLIC PNL TOTAL CA: CPT

## 2017-08-25 PROCEDURE — 84460 ALANINE AMINO (ALT) (SGPT): CPT

## 2017-08-25 PROCEDURE — 84075 ASSAY ALKALINE PHOSPHATASE: CPT

## 2017-08-25 PROCEDURE — 83880 ASSAY OF NATRIURETIC PEPTIDE: CPT

## 2017-08-25 PROCEDURE — 85025 COMPLETE CBC W/AUTO DIFF WBC: CPT

## 2017-08-25 PROCEDURE — 36415 COLL VENOUS BLD VENIPUNCTURE: CPT

## 2017-08-25 PROCEDURE — 82247 BILIRUBIN TOTAL: CPT

## 2017-08-26 DIAGNOSIS — E78.01 FAMILIAL HYPERCHOLESTEROLEMIA: ICD-10-CM

## 2017-08-28 RX ORDER — PRAVASTATIN SODIUM 20 MG
20 TABLET ORAL DAILY
Qty: 30 TAB | Refills: 11 | Status: SHIPPED | OUTPATIENT
Start: 2017-08-28 | End: 2018-08-08 | Stop reason: SDUPTHER

## 2017-09-08 ENCOUNTER — HOSPITAL ENCOUNTER (OUTPATIENT)
Dept: LAB | Facility: MEDICAL CENTER | Age: 57
End: 2017-09-08
Attending: INTERNAL MEDICINE
Payer: MEDICARE

## 2017-09-08 LAB
ALBUMIN SERPL BCP-MCNC: 4 G/DL (ref 3.2–4.9)
ALBUMIN/GLOB SERPL: 1.7 G/DL
ALP SERPL-CCNC: 29 U/L (ref 30–99)
ALT SERPL-CCNC: 15 U/L (ref 2–50)
ANION GAP SERPL CALC-SCNC: 9 MMOL/L (ref 0–11.9)
AST SERPL-CCNC: 19 U/L (ref 12–45)
BASOPHILS # BLD AUTO: 0.6 % (ref 0–1.8)
BASOPHILS # BLD: 0.05 K/UL (ref 0–0.12)
BILIRUB SERPL-MCNC: 0.4 MG/DL (ref 0.1–1.5)
BUN SERPL-MCNC: 21 MG/DL (ref 8–22)
CALCIUM SERPL-MCNC: 9.1 MG/DL (ref 8.5–10.5)
CHLORIDE SERPL-SCNC: 102 MMOL/L (ref 96–112)
CO2 SERPL-SCNC: 32 MMOL/L (ref 20–33)
CREAT SERPL-MCNC: 0.97 MG/DL (ref 0.5–1.4)
EOSINOPHIL # BLD AUTO: 0.12 K/UL (ref 0–0.51)
EOSINOPHIL NFR BLD: 1.5 % (ref 0–6.9)
ERYTHROCYTE [DISTWIDTH] IN BLOOD BY AUTOMATED COUNT: 43.9 FL (ref 35.9–50)
GFR SERPL CREATININE-BSD FRML MDRD: 59 ML/MIN/1.73 M 2
GGT SERPL-CCNC: 8 U/L (ref 7–34)
GLOBULIN SER CALC-MCNC: 2.4 G/DL (ref 1.9–3.5)
GLUCOSE SERPL-MCNC: 97 MG/DL (ref 65–99)
HCT VFR BLD AUTO: 38.6 % (ref 37–47)
HGB BLD-MCNC: 12.6 G/DL (ref 12–16)
IMM GRANULOCYTES # BLD AUTO: 0.02 K/UL (ref 0–0.11)
IMM GRANULOCYTES NFR BLD AUTO: 0.2 % (ref 0–0.9)
LYMPHOCYTES # BLD AUTO: 0.89 K/UL (ref 1–4.8)
LYMPHOCYTES NFR BLD: 10.9 % (ref 22–41)
MAGNESIUM SERPL-MCNC: 2 MG/DL (ref 1.5–2.5)
MCH RBC QN AUTO: 29.6 PG (ref 27–33)
MCHC RBC AUTO-ENTMCNC: 32.6 G/DL (ref 33.6–35)
MCV RBC AUTO: 90.6 FL (ref 81.4–97.8)
MONOCYTES # BLD AUTO: 0.28 K/UL (ref 0–0.85)
MONOCYTES NFR BLD AUTO: 3.4 % (ref 0–13.4)
NEUTROPHILS # BLD AUTO: 6.77 K/UL (ref 2–7.15)
NEUTROPHILS NFR BLD: 83.4 % (ref 44–72)
NRBC # BLD AUTO: 0 K/UL
NRBC BLD AUTO-RTO: 0 /100 WBC
PLATELET # BLD AUTO: 79 K/UL (ref 164–446)
PMV BLD AUTO: 9.9 FL (ref 9–12.9)
POTASSIUM SERPL-SCNC: 4.2 MMOL/L (ref 3.6–5.5)
PROT SERPL-MCNC: 6.4 G/DL (ref 6–8.2)
RBC # BLD AUTO: 4.26 M/UL (ref 4.2–5.4)
SODIUM SERPL-SCNC: 143 MMOL/L (ref 135–145)
WBC # BLD AUTO: 8.1 K/UL (ref 4.8–10.8)

## 2017-09-08 PROCEDURE — 36415 COLL VENOUS BLD VENIPUNCTURE: CPT

## 2017-09-08 PROCEDURE — 85025 COMPLETE CBC W/AUTO DIFF WBC: CPT

## 2017-09-08 PROCEDURE — 80197 ASSAY OF TACROLIMUS: CPT

## 2017-09-08 PROCEDURE — 82977 ASSAY OF GGT: CPT

## 2017-09-08 PROCEDURE — 83735 ASSAY OF MAGNESIUM: CPT

## 2017-09-08 PROCEDURE — 80053 COMPREHEN METABOLIC PANEL: CPT

## 2017-09-10 LAB — TACROLIMUS BLD-MCNC: 1.7 NG/ML

## 2017-09-12 DIAGNOSIS — I27.21 PAH (PULMONARY ARTERY HYPERTENSION) (HCC): ICD-10-CM

## 2017-09-12 RX ORDER — AMBRISENTAN 10 MG/1
10 TABLET, FILM COATED ORAL DAILY
Qty: 90 TAB | Refills: 3 | Status: SHIPPED | OUTPATIENT
Start: 2017-09-12 | End: 2017-09-12 | Stop reason: SDUPTHER

## 2017-09-15 ENCOUNTER — HOSPITAL ENCOUNTER (OUTPATIENT)
Dept: LAB | Facility: MEDICAL CENTER | Age: 57
End: 2017-09-15
Attending: INTERNAL MEDICINE
Payer: MEDICARE

## 2017-09-15 LAB — TSH SERPL DL<=0.005 MIU/L-ACNC: 1.33 UIU/ML (ref 0.3–3.7)

## 2017-09-15 PROCEDURE — 84630 ASSAY OF ZINC: CPT

## 2017-09-15 PROCEDURE — 84443 ASSAY THYROID STIM HORMONE: CPT

## 2017-09-15 PROCEDURE — 36415 COLL VENOUS BLD VENIPUNCTURE: CPT

## 2017-09-18 ENCOUNTER — TELEPHONE (OUTPATIENT)
Dept: MEDICAL GROUP | Facility: PHYSICIAN GROUP | Age: 57
End: 2017-09-18

## 2017-09-18 RX ORDER — AMBRISENTAN 10 MG/1
10 TABLET, FILM COATED ORAL DAILY
Qty: 30 TAB | Refills: 11 | Status: SHIPPED | OUTPATIENT
Start: 2017-09-18 | End: 2018-10-28

## 2017-09-18 NOTE — TELEPHONE ENCOUNTER
"Pt called stating she was told to tell you that he WBC is 8.1 which is \"Normal\" but she usually runs in the 3's   So they are thinking that she has an infection. She states that her throat hurts again and she is thinking that she might have thrush again. She is requesting liquid diflucan.    Please advise. Thank you.  "

## 2017-09-19 DIAGNOSIS — B37.0 ORAL THRUSH: ICD-10-CM

## 2017-09-19 RX ORDER — FLUCONAZOLE 100 MG/1
100 TABLET ORAL DAILY
Qty: 10 TAB | Refills: 0 | Status: SHIPPED | OUTPATIENT
Start: 2017-09-19 | End: 2017-09-29

## 2017-09-20 LAB — TEST NAME 95000: NORMAL

## 2017-09-25 ENCOUNTER — TELEPHONE (OUTPATIENT)
Dept: MEDICAL GROUP | Facility: PHYSICIAN GROUP | Age: 57
End: 2017-09-25

## 2017-09-25 ENCOUNTER — HOSPITAL ENCOUNTER (OUTPATIENT)
Dept: LAB | Facility: MEDICAL CENTER | Age: 57
End: 2017-09-25
Attending: INTERNAL MEDICINE
Payer: MEDICARE

## 2017-09-25 ENCOUNTER — HOSPITAL ENCOUNTER (OUTPATIENT)
Facility: MEDICAL CENTER | Age: 57
End: 2017-09-25
Attending: FAMILY MEDICINE
Payer: MEDICARE

## 2017-09-25 ENCOUNTER — NON-PROVIDER VISIT (OUTPATIENT)
Dept: MEDICAL GROUP | Facility: PHYSICIAN GROUP | Age: 57
End: 2017-09-25
Payer: MEDICARE

## 2017-09-25 DIAGNOSIS — J02.9 SORE THROAT: ICD-10-CM

## 2017-09-25 DIAGNOSIS — Z23 NEED FOR VACCINATION: ICD-10-CM

## 2017-09-25 DIAGNOSIS — D84.9 IMMUNOCOMPROMISED STATE (HCC): ICD-10-CM

## 2017-09-25 LAB
ALBUMIN SERPL BCP-MCNC: 4.2 G/DL (ref 3.2–4.9)
ALBUMIN/GLOB SERPL: 2 G/DL
ALP SERPL-CCNC: 27 U/L (ref 30–99)
ALT SERPL-CCNC: 14 U/L (ref 2–50)
ANION GAP SERPL CALC-SCNC: 8 MMOL/L (ref 0–11.9)
AST SERPL-CCNC: 18 U/L (ref 12–45)
BASOPHILS # BLD AUTO: 0.7 % (ref 0–1.8)
BASOPHILS # BLD: 0.03 K/UL (ref 0–0.12)
BILIRUB SERPL-MCNC: 0.4 MG/DL (ref 0.1–1.5)
BUN SERPL-MCNC: 24 MG/DL (ref 8–22)
CALCIUM SERPL-MCNC: 9.3 MG/DL (ref 8.5–10.5)
CHLORIDE SERPL-SCNC: 100 MMOL/L (ref 96–112)
CO2 SERPL-SCNC: 30 MMOL/L (ref 20–33)
CREAT SERPL-MCNC: 1.13 MG/DL (ref 0.5–1.4)
EOSINOPHIL # BLD AUTO: 0.13 K/UL (ref 0–0.51)
EOSINOPHIL NFR BLD: 2.9 % (ref 0–6.9)
ERYTHROCYTE [DISTWIDTH] IN BLOOD BY AUTOMATED COUNT: 46.1 FL (ref 35.9–50)
GFR SERPL CREATININE-BSD FRML MDRD: 50 ML/MIN/1.73 M 2
GGT SERPL-CCNC: 9 U/L (ref 7–34)
GLOBULIN SER CALC-MCNC: 2.1 G/DL (ref 1.9–3.5)
GLUCOSE SERPL-MCNC: 159 MG/DL (ref 65–99)
HCT VFR BLD AUTO: 37.8 % (ref 37–47)
HGB BLD-MCNC: 11.9 G/DL (ref 12–16)
IMM GRANULOCYTES # BLD AUTO: 0.04 K/UL (ref 0–0.11)
IMM GRANULOCYTES NFR BLD AUTO: 0.9 % (ref 0–0.9)
LYMPHOCYTES # BLD AUTO: 0.65 K/UL (ref 1–4.8)
LYMPHOCYTES NFR BLD: 14.3 % (ref 22–41)
MAGNESIUM SERPL-MCNC: 2 MG/DL (ref 1.5–2.5)
MCH RBC QN AUTO: 28.7 PG (ref 27–33)
MCHC RBC AUTO-ENTMCNC: 31.5 G/DL (ref 33.6–35)
MCV RBC AUTO: 91.3 FL (ref 81.4–97.8)
MONOCYTES # BLD AUTO: 0.15 K/UL (ref 0–0.85)
MONOCYTES NFR BLD AUTO: 3.3 % (ref 0–13.4)
NEUTROPHILS # BLD AUTO: 3.56 K/UL (ref 2–7.15)
NEUTROPHILS NFR BLD: 77.9 % (ref 44–72)
NRBC # BLD AUTO: 0 K/UL
NRBC BLD AUTO-RTO: 0 /100 WBC
PLATELET # BLD AUTO: 78 K/UL (ref 164–446)
PMV BLD AUTO: 9 FL (ref 9–12.9)
POTASSIUM SERPL-SCNC: 4.4 MMOL/L (ref 3.6–5.5)
PROT SERPL-MCNC: 6.3 G/DL (ref 6–8.2)
RBC # BLD AUTO: 4.14 M/UL (ref 4.2–5.4)
SODIUM SERPL-SCNC: 138 MMOL/L (ref 135–145)
WBC # BLD AUTO: 4.6 K/UL (ref 4.8–10.8)

## 2017-09-25 PROCEDURE — 83735 ASSAY OF MAGNESIUM: CPT

## 2017-09-25 PROCEDURE — 85025 COMPLETE CBC W/AUTO DIFF WBC: CPT

## 2017-09-25 PROCEDURE — G0008 ADMIN INFLUENZA VIRUS VAC: HCPCS | Performed by: FAMILY MEDICINE

## 2017-09-25 PROCEDURE — 82977 ASSAY OF GGT: CPT

## 2017-09-25 PROCEDURE — 90662 IIV NO PRSV INCREASED AG IM: CPT | Performed by: FAMILY MEDICINE

## 2017-09-25 PROCEDURE — 87070 CULTURE OTHR SPECIMN AEROBIC: CPT

## 2017-09-25 PROCEDURE — 80197 ASSAY OF TACROLIMUS: CPT

## 2017-09-25 PROCEDURE — 80053 COMPREHEN METABOLIC PANEL: CPT

## 2017-09-25 PROCEDURE — 36415 COLL VENOUS BLD VENIPUNCTURE: CPT

## 2017-09-25 NOTE — PROGRESS NOTES
"Roxana Cuba is a 57 y.o. female here for a non-provider visit for:   FLU    Reason for immunization: Annual Flu Vaccine  Immunization records indicate need for vaccine: Yes, confirmed with Epic  Minimum interval has been met for this vaccine: Yes  ABN completed: Not Indicated    Order and dose verified by: ARY MCLAIN  VIS was given to patient: Yes  All IAC Questionnaire questions were answered \"No.\"    Patient tolerated injection and no adverse effects were observed or reported: Yes    Pt scheduled for next dose in series: Not Indicated    "

## 2017-09-27 LAB — TACROLIMUS BLD-MCNC: 1.8 NG/ML

## 2017-09-28 LAB
BACTERIA SPEC RESP CULT: NORMAL
SIGNIFICANT IND 70042: NORMAL
SOURCE SOURCE: NORMAL

## 2017-10-03 ENCOUNTER — HOSPITAL ENCOUNTER (INPATIENT)
Facility: MEDICAL CENTER | Age: 57
LOS: 3 days | DRG: 190 | End: 2017-10-06
Attending: EMERGENCY MEDICINE | Admitting: INTERNAL MEDICINE
Payer: MEDICARE

## 2017-10-03 ENCOUNTER — RESOLUTE PROFESSIONAL BILLING HOSPITAL PROF FEE (OUTPATIENT)
Dept: HOSPITALIST | Facility: MEDICAL CENTER | Age: 57
End: 2017-10-03
Payer: MEDICARE

## 2017-10-03 ENCOUNTER — APPOINTMENT (OUTPATIENT)
Dept: RADIOLOGY | Facility: MEDICAL CENTER | Age: 57
DRG: 190 | End: 2017-10-03
Attending: EMERGENCY MEDICINE
Payer: MEDICARE

## 2017-10-03 DIAGNOSIS — J18.9 PNEUMONIA OF BOTH LUNGS DUE TO INFECTIOUS ORGANISM, UNSPECIFIED PART OF LUNG: ICD-10-CM

## 2017-10-03 DIAGNOSIS — G89.29 CHRONIC ABDOMINAL PAIN: ICD-10-CM

## 2017-10-03 DIAGNOSIS — Z94.4 HISTORY OF LIVER TRANSPLANT (HCC): ICD-10-CM

## 2017-10-03 DIAGNOSIS — R10.9 CHRONIC ABDOMINAL PAIN: ICD-10-CM

## 2017-10-03 PROBLEM — Z86.19 HISTORY OF CLOSTRIDIUM DIFFICILE INFECTION: Status: ACTIVE | Noted: 2017-07-14

## 2017-10-03 PROBLEM — R73.9 STEROID-INDUCED HYPERGLYCEMIA: Status: ACTIVE | Noted: 2017-10-03

## 2017-10-03 PROBLEM — T38.0X5A STEROID-INDUCED HYPERGLYCEMIA: Status: ACTIVE | Noted: 2017-10-03

## 2017-10-03 LAB
ALBUMIN SERPL BCP-MCNC: 4 G/DL (ref 3.2–4.9)
ALBUMIN/GLOB SERPL: 1.7 G/DL
ALP SERPL-CCNC: 31 U/L (ref 30–99)
ALT SERPL-CCNC: 14 U/L (ref 2–50)
ANION GAP SERPL CALC-SCNC: 5 MMOL/L (ref 0–11.9)
APPEARANCE UR: CLEAR
AST SERPL-CCNC: 16 U/L (ref 12–45)
BASOPHILS # BLD AUTO: 0.3 % (ref 0–1.8)
BASOPHILS # BLD: 0.02 K/UL (ref 0–0.12)
BILIRUB SERPL-MCNC: 0.3 MG/DL (ref 0.1–1.5)
BILIRUB UR QL STRIP.AUTO: NEGATIVE
BNP SERPL-MCNC: 56 PG/ML (ref 0–100)
BUN SERPL-MCNC: 17 MG/DL (ref 8–22)
CALCIUM SERPL-MCNC: 9.6 MG/DL (ref 8.5–10.5)
CHLORIDE SERPL-SCNC: 99 MMOL/L (ref 96–112)
CO2 SERPL-SCNC: 33 MMOL/L (ref 20–33)
COLOR UR: YELLOW
CREAT SERPL-MCNC: 1.08 MG/DL (ref 0.5–1.4)
CULTURE IF INDICATED INDCX: NO UA CULTURE
EOSINOPHIL # BLD AUTO: 0.07 K/UL (ref 0–0.51)
EOSINOPHIL NFR BLD: 1 % (ref 0–6.9)
ERYTHROCYTE [DISTWIDTH] IN BLOOD BY AUTOMATED COUNT: 45.9 FL (ref 35.9–50)
GFR SERPL CREATININE-BSD FRML MDRD: 52 ML/MIN/1.73 M 2
GLOBULIN SER CALC-MCNC: 2.4 G/DL (ref 1.9–3.5)
GLUCOSE SERPL-MCNC: 192 MG/DL (ref 65–99)
GLUCOSE UR STRIP.AUTO-MCNC: NEGATIVE MG/DL
HCT VFR BLD AUTO: 38 % (ref 37–47)
HGB BLD-MCNC: 12.5 G/DL (ref 12–16)
IMM GRANULOCYTES # BLD AUTO: 0.03 K/UL (ref 0–0.11)
IMM GRANULOCYTES NFR BLD AUTO: 0.4 % (ref 0–0.9)
KETONES UR STRIP.AUTO-MCNC: NEGATIVE MG/DL
LACTATE BLD-SCNC: 0.7 MMOL/L (ref 0.5–2)
LEUKOCYTE ESTERASE UR QL STRIP.AUTO: NEGATIVE
LIPASE SERPL-CCNC: 14 U/L (ref 11–82)
LYMPHOCYTES # BLD AUTO: 0.68 K/UL (ref 1–4.8)
LYMPHOCYTES NFR BLD: 10.1 % (ref 22–41)
MCH RBC QN AUTO: 29.9 PG (ref 27–33)
MCHC RBC AUTO-ENTMCNC: 32.9 G/DL (ref 33.6–35)
MCV RBC AUTO: 90.9 FL (ref 81.4–97.8)
MICRO URNS: NORMAL
MONOCYTES # BLD AUTO: 0.25 K/UL (ref 0–0.85)
MONOCYTES NFR BLD AUTO: 3.7 % (ref 0–13.4)
NEUTROPHILS # BLD AUTO: 5.7 K/UL (ref 2–7.15)
NEUTROPHILS NFR BLD: 84.5 % (ref 44–72)
NITRITE UR QL STRIP.AUTO: NEGATIVE
NRBC # BLD AUTO: 0 K/UL
NRBC BLD AUTO-RTO: 0 /100 WBC
PH UR STRIP.AUTO: 5 [PH]
PLATELET # BLD AUTO: 66 K/UL (ref 164–446)
PMV BLD AUTO: 9 FL (ref 9–12.9)
POTASSIUM SERPL-SCNC: 4.5 MMOL/L (ref 3.6–5.5)
PROT SERPL-MCNC: 6.4 G/DL (ref 6–8.2)
PROT UR QL STRIP: NEGATIVE MG/DL
RBC # BLD AUTO: 4.18 M/UL (ref 4.2–5.4)
RBC UR QL AUTO: NEGATIVE
SODIUM SERPL-SCNC: 137 MMOL/L (ref 135–145)
SP GR UR STRIP.AUTO: 1
UROBILINOGEN UR STRIP.AUTO-MCNC: NORMAL MG/DL
WBC # BLD AUTO: 6.8 K/UL (ref 4.8–10.8)

## 2017-10-03 PROCEDURE — 83605 ASSAY OF LACTIC ACID: CPT

## 2017-10-03 PROCEDURE — 96365 THER/PROPH/DIAG IV INF INIT: CPT

## 2017-10-03 PROCEDURE — 770006 HCHG ROOM/CARE - MED/SURG/GYN SEMI*

## 2017-10-03 PROCEDURE — 99285 EMERGENCY DEPT VISIT HI MDM: CPT

## 2017-10-03 PROCEDURE — 96372 THER/PROPH/DIAG INJ SC/IM: CPT

## 2017-10-03 PROCEDURE — 700111 HCHG RX REV CODE 636 W/ 250 OVERRIDE (IP): Performed by: EMERGENCY MEDICINE

## 2017-10-03 PROCEDURE — 80053 COMPREHEN METABOLIC PANEL: CPT

## 2017-10-03 PROCEDURE — 304562 HCHG STAT O2 MASK/CANNULA

## 2017-10-03 PROCEDURE — 700111 HCHG RX REV CODE 636 W/ 250 OVERRIDE (IP): Performed by: INTERNAL MEDICINE

## 2017-10-03 PROCEDURE — 85025 COMPLETE CBC W/AUTO DIFF WBC: CPT

## 2017-10-03 PROCEDURE — 700102 HCHG RX REV CODE 250 W/ 637 OVERRIDE(OP): Performed by: INTERNAL MEDICINE

## 2017-10-03 PROCEDURE — 83880 ASSAY OF NATRIURETIC PEPTIDE: CPT

## 2017-10-03 PROCEDURE — A9270 NON-COVERED ITEM OR SERVICE: HCPCS | Performed by: INTERNAL MEDICINE

## 2017-10-03 PROCEDURE — 81003 URINALYSIS AUTO W/O SCOPE: CPT

## 2017-10-03 PROCEDURE — 96375 TX/PRO/DX INJ NEW DRUG ADDON: CPT

## 2017-10-03 PROCEDURE — 87040 BLOOD CULTURE FOR BACTERIA: CPT

## 2017-10-03 PROCEDURE — 83690 ASSAY OF LIPASE: CPT

## 2017-10-03 PROCEDURE — 71010 DX-CHEST-LIMITED (1 VIEW): CPT

## 2017-10-03 PROCEDURE — 99223 1ST HOSP IP/OBS HIGH 75: CPT | Mod: AI | Performed by: INTERNAL MEDICINE

## 2017-10-03 RX ORDER — BISACODYL 10 MG
10 SUPPOSITORY, RECTAL RECTAL
Status: DISCONTINUED | OUTPATIENT
Start: 2017-10-03 | End: 2017-10-06 | Stop reason: HOSPADM

## 2017-10-03 RX ORDER — ONDANSETRON 4 MG/1
4 TABLET, ORALLY DISINTEGRATING ORAL EVERY 8 HOURS PRN
Status: DISCONTINUED | OUTPATIENT
Start: 2017-10-03 | End: 2017-10-06 | Stop reason: HOSPADM

## 2017-10-03 RX ORDER — TIOTROPIUM BROMIDE 18 UG/1
1 CAPSULE ORAL; RESPIRATORY (INHALATION)
Status: DISCONTINUED | OUTPATIENT
Start: 2017-10-04 | End: 2017-10-06 | Stop reason: HOSPADM

## 2017-10-03 RX ORDER — MORPHINE SULFATE 4 MG/ML
4 INJECTION, SOLUTION INTRAMUSCULAR; INTRAVENOUS ONCE
Status: COMPLETED | OUTPATIENT
Start: 2017-10-03 | End: 2017-10-03

## 2017-10-03 RX ORDER — TADALAFIL 20 MG/1
40 TABLET ORAL
Status: DISCONTINUED | OUTPATIENT
Start: 2017-10-03 | End: 2017-10-03

## 2017-10-03 RX ORDER — FUROSEMIDE 40 MG/1
40 TABLET ORAL
Status: DISCONTINUED | OUTPATIENT
Start: 2017-10-04 | End: 2017-10-06 | Stop reason: HOSPADM

## 2017-10-03 RX ORDER — GUAIFENESIN/DEXTROMETHORPHAN 100-10MG/5
5 SYRUP ORAL EVERY 6 HOURS PRN
Status: DISCONTINUED | OUTPATIENT
Start: 2017-10-03 | End: 2017-10-06 | Stop reason: HOSPADM

## 2017-10-03 RX ORDER — LEVOTHYROXINE SODIUM 137 UG/1
137 TABLET ORAL
COMMUNITY
End: 2017-12-29 | Stop reason: SDUPTHER

## 2017-10-03 RX ORDER — FLUTICASONE PROPIONATE 50 MCG
1 SPRAY, SUSPENSION (ML) NASAL 2 TIMES DAILY
Status: DISCONTINUED | OUTPATIENT
Start: 2017-10-03 | End: 2017-10-06 | Stop reason: HOSPADM

## 2017-10-03 RX ORDER — MORPHINE SULFATE 15 MG/1
15 TABLET ORAL EVERY 4 HOURS PRN
Status: DISCONTINUED | OUTPATIENT
Start: 2017-10-03 | End: 2017-10-06 | Stop reason: HOSPADM

## 2017-10-03 RX ORDER — SODIUM CHLORIDE 9 MG/ML
500 INJECTION, SOLUTION INTRAVENOUS
Status: DISCONTINUED | OUTPATIENT
Start: 2017-10-03 | End: 2017-10-06 | Stop reason: HOSPADM

## 2017-10-03 RX ORDER — PRAVASTATIN SODIUM 10 MG
20 TABLET ORAL DAILY
Status: DISCONTINUED | OUTPATIENT
Start: 2017-10-04 | End: 2017-10-06 | Stop reason: HOSPADM

## 2017-10-03 RX ORDER — LEVOTHYROXINE SODIUM 137 UG/1
137 TABLET ORAL
Status: DISCONTINUED | OUTPATIENT
Start: 2017-10-04 | End: 2017-10-06 | Stop reason: HOSPADM

## 2017-10-03 RX ORDER — OMEPRAZOLE 20 MG/1
40 CAPSULE, DELAYED RELEASE ORAL DAILY
Status: DISCONTINUED | OUTPATIENT
Start: 2017-10-04 | End: 2017-10-06 | Stop reason: HOSPADM

## 2017-10-03 RX ORDER — AMPICILLIN AND SULBACTAM 2; 1 G/1; G/1
3 INJECTION, POWDER, FOR SOLUTION INTRAMUSCULAR; INTRAVENOUS ONCE
Status: COMPLETED | OUTPATIENT
Start: 2017-10-03 | End: 2017-10-03

## 2017-10-03 RX ORDER — AMBRISENTAN 10 MG/1
10 TABLET, FILM COATED ORAL DAILY
Status: DISCONTINUED | OUTPATIENT
Start: 2017-10-03 | End: 2017-10-03

## 2017-10-03 RX ORDER — SENNOSIDES A AND B 8.6 MG/1
17.2 TABLET, FILM COATED ORAL 2 TIMES DAILY
Status: DISCONTINUED | OUTPATIENT
Start: 2017-10-03 | End: 2017-10-03

## 2017-10-03 RX ORDER — AMBRISENTAN 10 MG/1
10 TABLET, FILM COATED ORAL DAILY
Status: DISCONTINUED | OUTPATIENT
Start: 2017-10-04 | End: 2017-10-06 | Stop reason: HOSPADM

## 2017-10-03 RX ORDER — OXYCODONE HYDROCHLORIDE 30 MG/1
30 TABLET ORAL EVERY 6 HOURS PRN
Status: DISCONTINUED | OUTPATIENT
Start: 2017-10-03 | End: 2017-10-06 | Stop reason: HOSPADM

## 2017-10-03 RX ORDER — MYCOPHENOLATE MOFETIL 250 MG/1
500 CAPSULE ORAL 2 TIMES DAILY
Status: DISCONTINUED | OUTPATIENT
Start: 2017-10-03 | End: 2017-10-06 | Stop reason: HOSPADM

## 2017-10-03 RX ORDER — TRAZODONE HYDROCHLORIDE 50 MG/1
50 TABLET ORAL
Status: DISCONTINUED | OUTPATIENT
Start: 2017-10-03 | End: 2017-10-06 | Stop reason: HOSPADM

## 2017-10-03 RX ORDER — OXYCODONE HYDROCHLORIDE 5 MG/1
5 TABLET ORAL
Status: DISCONTINUED | OUTPATIENT
Start: 2017-10-03 | End: 2017-10-03

## 2017-10-03 RX ORDER — AZITHROMYCIN 500 MG/1
500 INJECTION, POWDER, LYOPHILIZED, FOR SOLUTION INTRAVENOUS ONCE
Status: DISCONTINUED | OUTPATIENT
Start: 2017-10-03 | End: 2017-10-03

## 2017-10-03 RX ORDER — ASCORBIC ACID 500 MG
500 TABLET ORAL DAILY
Status: DISCONTINUED | OUTPATIENT
Start: 2017-10-04 | End: 2017-10-06 | Stop reason: HOSPADM

## 2017-10-03 RX ORDER — POLYETHYLENE GLYCOL 3350 17 G/17G
1 POWDER, FOR SOLUTION ORAL
Status: DISCONTINUED | OUTPATIENT
Start: 2017-10-03 | End: 2017-10-06 | Stop reason: HOSPADM

## 2017-10-03 RX ORDER — AMOXICILLIN 250 MG
2 CAPSULE ORAL 2 TIMES DAILY
Status: DISCONTINUED | OUTPATIENT
Start: 2017-10-03 | End: 2017-10-06 | Stop reason: HOSPADM

## 2017-10-03 RX ORDER — DOXYCYCLINE 100 MG/1
100 TABLET ORAL EVERY 12 HOURS
Status: DISCONTINUED | OUTPATIENT
Start: 2017-10-03 | End: 2017-10-06 | Stop reason: HOSPADM

## 2017-10-03 RX ORDER — PREDNISONE 20 MG/1
40 TABLET ORAL DAILY
Status: DISCONTINUED | OUTPATIENT
Start: 2017-10-03 | End: 2017-10-04

## 2017-10-03 RX ORDER — OXYCODONE HYDROCHLORIDE 5 MG/1
2.5 TABLET ORAL
Status: DISCONTINUED | OUTPATIENT
Start: 2017-10-03 | End: 2017-10-03

## 2017-10-03 RX ORDER — GABAPENTIN 300 MG/1
600 CAPSULE ORAL 3 TIMES DAILY
Status: DISCONTINUED | OUTPATIENT
Start: 2017-10-03 | End: 2017-10-06 | Stop reason: HOSPADM

## 2017-10-03 RX ORDER — OXYCODONE HYDROCHLORIDE 10 MG/1
10 TABLET ORAL EVERY 6 HOURS PRN
Status: DISCONTINUED | OUTPATIENT
Start: 2017-10-03 | End: 2017-10-06 | Stop reason: HOSPADM

## 2017-10-03 RX ORDER — DEXTROSE MONOHYDRATE 25 G/50ML
25 INJECTION, SOLUTION INTRAVENOUS
Status: DISCONTINUED | OUTPATIENT
Start: 2017-10-03 | End: 2017-10-06 | Stop reason: HOSPADM

## 2017-10-03 RX ORDER — SERTRALINE HYDROCHLORIDE 100 MG/1
100 TABLET, FILM COATED ORAL
Status: DISCONTINUED | OUTPATIENT
Start: 2017-10-03 | End: 2017-10-06 | Stop reason: HOSPADM

## 2017-10-03 RX ORDER — TADALAFIL 20 MG/1
40 TABLET ORAL
Status: DISCONTINUED | OUTPATIENT
Start: 2017-10-04 | End: 2017-10-06 | Stop reason: HOSPADM

## 2017-10-03 RX ORDER — OXYCODONE HYDROCHLORIDE 5 MG/1
15 TABLET ORAL EVERY 6 HOURS PRN
Status: DISCONTINUED | OUTPATIENT
Start: 2017-10-03 | End: 2017-10-06 | Stop reason: HOSPADM

## 2017-10-03 RX ORDER — L. ACIDOPHILUS/L.BULGARICUS 100MM CELL
1 GRANULES IN PACKET (EA) ORAL
Status: DISCONTINUED | OUTPATIENT
Start: 2017-10-03 | End: 2017-10-06 | Stop reason: HOSPADM

## 2017-10-03 RX ORDER — ALPRAZOLAM 0.5 MG/1
0.5 TABLET ORAL 3 TIMES DAILY
Status: DISCONTINUED | OUTPATIENT
Start: 2017-10-03 | End: 2017-10-06 | Stop reason: HOSPADM

## 2017-10-03 RX ORDER — CYCLOBENZAPRINE HCL 10 MG
10 TABLET ORAL 3 TIMES DAILY PRN
Status: DISCONTINUED | OUTPATIENT
Start: 2017-10-03 | End: 2017-10-06 | Stop reason: HOSPADM

## 2017-10-03 RX ORDER — OXYCODONE HYDROCHLORIDE 5 MG/1
10-30 TABLET ORAL EVERY 6 HOURS PRN
Status: DISCONTINUED | OUTPATIENT
Start: 2017-10-03 | End: 2017-10-03

## 2017-10-03 RX ADMIN — MYCOPHENOLATE MOFETIL 500 MG: 250 CAPSULE ORAL at 22:10

## 2017-10-03 RX ADMIN — GABAPENTIN 600 MG: 300 CAPSULE ORAL at 21:49

## 2017-10-03 RX ADMIN — HYDROMORPHONE HYDROCHLORIDE 0.25 MG: 1 INJECTION, SOLUTION INTRAMUSCULAR; INTRAVENOUS; SUBCUTANEOUS at 19:53

## 2017-10-03 RX ADMIN — OXYCODONE HYDROCHLORIDE 30 MG: 5 TABLET ORAL at 17:49

## 2017-10-03 RX ADMIN — NYSTATIN 500000 UNITS: 100000 SUSPENSION ORAL at 22:15

## 2017-10-03 RX ADMIN — TRAZODONE HYDROCHLORIDE 50 MG: 50 TABLET ORAL at 21:51

## 2017-10-03 RX ADMIN — STANDARDIZED SENNA CONCENTRATE AND DOCUSATE SODIUM 2 TABLET: 8.6; 5 TABLET, FILM COATED ORAL at 21:50

## 2017-10-03 RX ADMIN — FLUTICASONE PROPIONATE 50 MCG: 50 SPRAY, METERED NASAL at 22:19

## 2017-10-03 RX ADMIN — HYDROMORPHONE HYDROCHLORIDE 0.25 MG: 1 INJECTION, SOLUTION INTRAMUSCULAR; INTRAVENOUS; SUBCUTANEOUS at 23:06

## 2017-10-03 RX ADMIN — CEFTRIAXONE 2 G: 2 INJECTION, SOLUTION INTRAVENOUS at 19:53

## 2017-10-03 RX ADMIN — AMPICILLIN SODIUM AND SULBACTAM SODIUM 3 G: 2; 1 INJECTION, POWDER, FOR SOLUTION INTRAMUSCULAR; INTRAVENOUS at 16:00

## 2017-10-03 RX ADMIN — DOXYCYCLINE 100 MG: 100 TABLET ORAL at 21:55

## 2017-10-03 RX ADMIN — SERTRALINE 100 MG: 100 TABLET, FILM COATED ORAL at 21:51

## 2017-10-03 RX ADMIN — TACROLIMUS 1.5 MG: 1 CAPSULE ORAL at 22:11

## 2017-10-03 RX ADMIN — METHYLNALTREXONE BROMIDE 12 MG: 12 INJECTION, SOLUTION SUBCUTANEOUS at 19:53

## 2017-10-03 RX ADMIN — ALPRAZOLAM 0.5 MG: 0.5 TABLET ORAL at 21:48

## 2017-10-03 RX ADMIN — MORPHINE SULFATE 4 MG: 4 INJECTION INTRAVENOUS at 16:07

## 2017-10-03 ASSESSMENT — COGNITIVE AND FUNCTIONAL STATUS - GENERAL
SUGGESTED CMS G CODE MODIFIER MOBILITY: CH
SUGGESTED CMS G CODE MODIFIER DAILY ACTIVITY: CH
DAILY ACTIVITIY SCORE: 24
MOBILITY SCORE: 24

## 2017-10-03 ASSESSMENT — ENCOUNTER SYMPTOMS
DIARRHEA: 0
EYE REDNESS: 0
FALLS: 0
NERVOUS/ANXIOUS: 0
SHORTNESS OF BREATH: 1
FEVER: 0
PALPITATIONS: 0
DIZZINESS: 0
WHEEZING: 1
SEIZURES: 0
SPUTUM PRODUCTION: 1
INSOMNIA: 0
ABDOMINAL PAIN: 0
COUGH: 1
WEAKNESS: 0
CONSTIPATION: 1
FOCAL WEAKNESS: 0
HEADACHES: 0
VOMITING: 0
HEMOPTYSIS: 0
EYE PAIN: 0
NAUSEA: 0
CHILLS: 1
TREMORS: 0
MYALGIAS: 1
BLOOD IN STOOL: 0
LOSS OF CONSCIOUSNESS: 0

## 2017-10-03 ASSESSMENT — LIFESTYLE VARIABLES
DO YOU DRINK ALCOHOL: NO
ALCOHOL_USE: NO
EVER_SMOKED: NEVER

## 2017-10-03 ASSESSMENT — PAIN SCALES - GENERAL
PAINLEVEL_OUTOF10: 7
PAINLEVEL_OUTOF10: 8

## 2017-10-03 ASSESSMENT — PATIENT HEALTH QUESTIONNAIRE - PHQ9
SUM OF ALL RESPONSES TO PHQ QUESTIONS 1-9: 0
2. FEELING DOWN, DEPRESSED, IRRITABLE, OR HOPELESS: NOT AT ALL
SUM OF ALL RESPONSES TO PHQ9 QUESTIONS 1 AND 2: 0
1. LITTLE INTEREST OR PLEASURE IN DOING THINGS: NOT AT ALL

## 2017-10-03 NOTE — ED PROVIDER NOTES
"ED Provider Note    CHIEF COMPLAINT  Chief Complaint   Patient presents with   • LUQ Pain     x 2 days       HPI  Roxana Cuba is a 57 y.o. female who presentsFor evaluation of a cough the left upper quadrant abdominal pain, she states that she gets pneumonia she typically gets pain in her left upper quadrant because of her spleen. She has a history of liver transportation 5 years ago and has a chronic splenomegaly. She states over the past few days she has been coughing, productive cough, no documented fever, she is on chronic home O2 with no need for increased supplemental oxygen. She denies chest pain or back pain.     REVIEW OF SYSTEMS  Negative for fever, rash, chest pain, diarrhea, headache, focal weakness, focal numbness, focal tingling, back pain. All other systems are negative.     PAST MEDICAL HISTORY  Past Medical History:   Diagnosis Date   • Low back pain 02-17-17    and left foot, 7/10   • H/O Clostridium difficile infection 02-17-17    reports \"16 months ago\". Current stool sample 01-26-17 neg   • Cirrhosis (CMS-HCC) December 2011    Status post liver transplant at [facility]   • Breath shortness    • Bronchitis      2016   • Cardiomegaly    • Chronic fatigue and malaise    • CKD (chronic kidney disease) stage 3, GFR 30-59 ml/min    • Diabetes (CMS-HCC)     reports hx of, resolved w/weight loss. Reports still checking bloodsugars twice daily and using insulin PRN   • Fracture of left foot    • GERD (gastroesophageal reflux disease)         • Hypothyroid    • Mild aortic regurgitation and aortic valve sclerosis     • On home oxygen therapy     4 liters, Dr. Gaming   • Platelet disorder (CMS-HCC)     low platelets   • Pneumonia     aspiration,    • Psychiatric disorder     Mood disorder   • Pulmonary hypertension        • S/P cholecystectomy    • Small bowel obstruction     01-   • Splenomegaly    • VRE (vancomycin-resistant Enterococci)     02-17-17, pt declines knowledge of this "       FAMILY HISTORY  Family History   Problem Relation Age of Onset   • Other Father      Unknown (dead 10 years)   • Diabetes Father    • Heart Disease Father    • Hypertension Father    • Hyperlipidemia Father    • Stroke Father    • Non-contributory Mother    • Hyperlipidemia Mother    • Alcohol/Drug Mother    • Cancer Paternal Aunt    • Alcohol/Drug Maternal Grandmother    • Alcohol/Drug Maternal Grandfather        SOCIAL HISTORY  Social History   Substance Use Topics   • Smoking status: Passive Smoke Exposure - Never Smoker   • Smokeless tobacco: Never Used      Comment: avoid all tobacco products   • Alcohol use No      Comment: 05/2009 quit drinking       SURGICAL HISTORY  Past Surgical History:   Procedure Laterality Date   • COLONOSCOPY N/A 3/27/2017    Procedure: COLONOSCOPY;  Surgeon: Osman Matt M.D.;  Location: SURGERY SAME DAY [facility];  Service:    • GASTROSCOPY N/A 3/27/2017    Procedure: GASTROSCOPY;  Surgeon: Osman Matt M.D.;  Location: SURGERY SAME DAY [facility];  Service:    • BREAST BIOPSY Left 2/21/2017    Procedure: BREAST BIOPSY - WIRE LOCALIZED EXCISONAL ;  Surgeon: Jane Zhao M.D.;  Location: SURGERY SAME DAY [facility];  Service:    • LUNG BIOPSY OPEN  11/2016   • OTHER ABDOMINAL SURGERY      Gasric Sleeve   • BONE MARROW ASPIRATION  3/16/2015    Performed by Freddie Hi M.D. at ENDOSCOPY [facility]   • BONE MARROW BIOPSY, NDL/TROCAR  3/16/2015    Performed by Freddei Hi M.D. at ENDOSCOPY [facility]   • RECOVERY  3/31/2014    Performed by [facility] at [facility]   • RECOVERY  3/24/2014    Performed by [facility]Recovery Surgery at SURGERY SAME DAY [facility]   • RECOVERY  1/21/2014    Performed by [facility] at SURGERY SAME DAY [facility]   • BRONCHOSCOPY-ENDO  11/15/2013    Performed by Ha Perez M.D. at ENDOSCOPY [facility]   • RECOVERY  2/27/2013    Performed by [facility] at SURGERY  "SAME DAY [facility]   • BONE MARROW ASPIRATION  12/31/2012    Performed by Josemanuel Real M.D. at ENDOSCOPY [facility]   • BONE MARROW BIOPSY, NDL/TROCAR  12/31/2012    Performed by Josemanuel Real M.D. at ENDOSCOPY [facility]   • GASTROSCOPY  9/28/2012    Performed by Aravind Richards M.D. at SURGERY [facility]   • SIGMOIDOSCOPY FLEX  9/26/2012    Performed by Kristopher Cardoso M.D. at ENDOSCOPY [facility]   • BRONCHOSCOPY-ENDO  6/19/2012    Performed by MARLENA MURILLO at ENDOSCOPY [facility]   • BRONCHOSCOPY-ENDO  5/29/2012    Performed by SUYAPA CAMARENA at ENDOSCOPY [facility]   • OTHER ABDOMINAL SURGERY  December 2011    Liver transplant at [facility] by Dr. Canada.   • GASTROSCOPY-ENDO  3/12/2010    Performed by CAMELIA SAMANO at ENDOSCOPY [facility]   • EXAM UNDER ANESTHESIA  11/11/2009    Performed by BIRD ABDI at SURGERY [facility] ORS   • HEMORRHOIDECTOMY  11/11/2009    Performed by BIRD ABDI at SURGERY [facility]   • KELBY BY LAPAROSCOPY  9/19/2009    Performed by BIRD ABDI at SURGERY [facility]   • CARPAL TUNNEL RELEASE          • HYSTERECTOMY, TOTAL ABDOMINAL          • OTHER      Breast reduction   • PB ANESTH,NOSE,SINUS SURGERY     • TONSILLECTOMY         CURRENT MEDICATIONS  I personally reviewed the medication list in the charting documentation.     ALLERGIES  Allergies   Allergen Reactions   • Rhubarb Anaphylaxis   • Organic Nitrates      Nitroglycerin products should be avoided with the use of PDE-5 inhibitors as the combination can result in severe hypotension.   • Vancomycin Hcl      Causes red man syndrome when infused to fast         MEDICAL RECORD  I have reviewed patient's medical record and pertinent results are listed above.      PHYSICAL EXAM  VITAL SIGNS: /55   Pulse 75   Temp 36.9 °C (98.4 °F) (Temporal)   Resp 14   Ht 1.727 m (5' 8\")   Wt 80.1 kg (176 lb 9.4 oz)   LMP 01/03/2000   SpO2 98%   BMI 26.85 " kg/m²    Constitutional: Well appearing patient in no acute distress.  Not toxic, nor ill in appearance.  HENT: Mucus membranesDry.    Eyes: No scleral icterus. Normal conjunctiva   Neck: Supple, comfortable, nonpainful range of motion.   Cardiovascular: Regular heart rate and rhythm.   Thorax & Lungs: Few scattered crackles, right sided expiratory wheezing.  Abdomen: Soft, nondistended, generalized tenderness without rebound, guarding. No obvious organomegaly.  Skin: Warm, dry. No rash appreciated  Extremities/Musculoskeletal: No sign of trauma. No asymmetric calf tenderness, erythema or edema. Normal range of motion   Neurologic: Alert & oriented. No focal deficits observed.   Psychiatric: Normal affect appropriate for the clinical situation.    DIAGNOSTIC STUDIES / PROCEDURES    LABS  Results for orders placed or performed during the hospital encounter of 10/03/17   BTYPE NATRIURETIC PEPTIDE   Result Value Ref Range    B Natriuretic Peptide 56 0 - 100 pg/mL   CBC WITH DIFFERENTIAL   Result Value Ref Range    WBC 6.8 4.8 - 10.8 K/uL    RBC 4.18 (L) 4.20 - 5.40 M/uL    Hemoglobin 12.5 12.0 - 16.0 g/dL    Hematocrit 38.0 37.0 - 47.0 %    MCV 90.9 81.4 - 97.8 fL    MCH 29.9 27.0 - 33.0 pg    MCHC 32.9 (L) 33.6 - 35.0 g/dL    RDW 45.9 35.9 - 50.0 fL    Platelet Count 66 (L) 164 - 446 K/uL    MPV 9.0 9.0 - 12.9 fL    Neutrophils-Polys 84.50 (H) 44.00 - 72.00 %    Lymphocytes 10.10 (L) 22.00 - 41.00 %    Monocytes 3.70 0.00 - 13.40 %    Eosinophils 1.00 0.00 - 6.90 %    Basophils 0.30 0.00 - 1.80 %    Immature Granulocytes 0.40 0.00 - 0.90 %    Nucleated RBC 0.00 /100 WBC    Neutrophils (Absolute) 5.70 2.00 - 7.15 K/uL    Lymphs (Absolute) 0.68 (L) 1.00 - 4.80 K/uL    Monos (Absolute) 0.25 0.00 - 0.85 K/uL    Eos (Absolute) 0.07 0.00 - 0.51 K/uL    Baso (Absolute) 0.02 0.00 - 0.12 K/uL    Immature Granulocytes (abs) 0.03 0.00 - 0.11 K/uL    NRBC (Absolute) 0.00 K/uL   COMP METABOLIC PANEL   Result Value Ref Range     Sodium 137 135 - 145 mmol/L    Potassium 4.5 3.6 - 5.5 mmol/L    Chloride 99 96 - 112 mmol/L    Co2 33 20 - 33 mmol/L    Anion Gap 5.0 0.0 - 11.9    Glucose 192 (H) 65 - 99 mg/dL    Bun 17 8 - 22 mg/dL    Creatinine 1.08 0.50 - 1.40 mg/dL    Calcium 9.6 8.5 - 10.5 mg/dL    AST(SGOT) 16 12 - 45 U/L    ALT(SGPT) 14 2 - 50 U/L    Alkaline Phosphatase 31 30 - 99 U/L    Total Bilirubin 0.3 0.1 - 1.5 mg/dL    Albumin 4.0 3.2 - 4.9 g/dL    Total Protein 6.4 6.0 - 8.2 g/dL    Globulin 2.4 1.9 - 3.5 g/dL    A-G Ratio 1.7 g/dL   LIPASE   Result Value Ref Range    Lipase 14 11 - 82 U/L   ESTIMATED GFR   Result Value Ref Range    GFR If African American >60 >60 mL/min/1.73 m 2    GFR If Non African American 52 (A) >60 mL/min/1.73 m 2   LACTIC ACID   Result Value Ref Range    Lactic Acid 0.7 0.5 - 2.0 mmol/L     *Note: Due to a large number of results and/or encounters for the requested time period, some results have not been displayed. A complete set of results can be found in Results Review.         RADIOLOGY  DX-CHEST-LIMITED (1 VIEW)   Final Result      Minimal bibasilar interstitial edema or pneumonia with small left pleural effusion.            COURSE & MEDICAL DECISION MAKING  I have reviewed any medical record information, laboratory studies and radiographic results as noted above.  Differential diagnoses includes: Pneumonia, fluid overload, dehydration, electrolyte abnormalities    Encounter Summary: This is a 57 y.o. female with productive cough, history of liver transplantation, requires chronic home O2 but not worsen, she is afebrile, white count is normal. X-ray does reveal a pattern concerning for pneumonia. She'll be treated with doxycycline and Unasyn, the doxycycline as the patient is on chronic azithromycin. He'll be admitted to the hospital in guarded condition      DISPOSITION: Admitted in guarded condition      FINAL IMPRESSION  1. Pneumonia of both lungs due to infectious organism, unspecified part of lung     2. Chronic abdominal pain    3. History of liver transplant (CMS-HCC)           This dictation was created using voice recognition software. The accuracy of the dictation is limited to the abilities of the software. I expect there may be some errors of grammar and possibly content. The nursing notes were reviewed and certain aspects of this information were incorporated into this note.    Electronically signed by: Ganesh Treadwell, 10/3/2017 3:49 PM

## 2017-10-03 NOTE — ED NOTES
"Pt ambulatory to triage c/o LUQ pain x 2 days.  \"Feels like my spleen is enlarged, my blood pressure is dropping, I'm coughing a lot so I will probably end up with pneumonia.  Pt reports liver transplant 5 years. Pt on 5L NC at all times.   "

## 2017-10-03 NOTE — ED NOTES
Urine sent to lab.   IV established, 2nd set of BC and lactic sent.   Pt resting comfortably.   Call light within reach  Personal belongings in reach  Bathroom and comfort needs met  VSS on monitor

## 2017-10-03 NOTE — ED NOTES
Patient insisting on talking to Mago, ED Director. Pt states she is a close friend, and I can tell Mago, she is here in the ED, and that she would like to speak with her. Patient states a nurse was not kind with his words to her  and that she wants to file a complaint with Mago. Mago at bedside.

## 2017-10-03 NOTE — ASSESSMENT & PLAN NOTE
Used to be on insulin, but now diet controlled secondary to weight loss (100 lbs per pt)  A1c 6.6  On insulin sliding scale

## 2017-10-03 NOTE — ED NOTES
Med rec updated and complete.  Allergies reviewed.  Met with pt at bedside and dicussed   Current medications.  Family will bring pts Malaika and Misty preston.

## 2017-10-04 LAB
ALBUMIN SERPL BCP-MCNC: 3.5 G/DL (ref 3.2–4.9)
ALBUMIN/GLOB SERPL: 1.6 G/DL
ALP SERPL-CCNC: 29 U/L (ref 30–99)
ALT SERPL-CCNC: 13 U/L (ref 2–50)
ANION GAP SERPL CALC-SCNC: 6 MMOL/L (ref 0–11.9)
AST SERPL-CCNC: 13 U/L (ref 12–45)
BILIRUB SERPL-MCNC: 0.2 MG/DL (ref 0.1–1.5)
BUN SERPL-MCNC: 17 MG/DL (ref 8–22)
CALCIUM SERPL-MCNC: 8.7 MG/DL (ref 8.5–10.5)
CHLORIDE SERPL-SCNC: 100 MMOL/L (ref 96–112)
CO2 SERPL-SCNC: 35 MMOL/L (ref 20–33)
CREAT SERPL-MCNC: 1.12 MG/DL (ref 0.5–1.4)
ERYTHROCYTE [DISTWIDTH] IN BLOOD BY AUTOMATED COUNT: 45 FL (ref 35.9–50)
EST. AVERAGE GLUCOSE BLD GHB EST-MCNC: 143 MG/DL
GFR SERPL CREATININE-BSD FRML MDRD: 50 ML/MIN/1.73 M 2
GLOBULIN SER CALC-MCNC: 2.2 G/DL (ref 1.9–3.5)
GLUCOSE BLD-MCNC: 170 MG/DL (ref 65–99)
GLUCOSE BLD-MCNC: 194 MG/DL (ref 65–99)
GLUCOSE BLD-MCNC: 195 MG/DL (ref 65–99)
GLUCOSE BLD-MCNC: 227 MG/DL (ref 65–99)
GLUCOSE SERPL-MCNC: 186 MG/DL (ref 65–99)
HBA1C MFR BLD: 6.6 % (ref 0–5.6)
HCT VFR BLD AUTO: 35.8 % (ref 37–47)
HGB BLD-MCNC: 11.8 G/DL (ref 12–16)
MCH RBC QN AUTO: 29.5 PG (ref 27–33)
MCHC RBC AUTO-ENTMCNC: 33 G/DL (ref 33.6–35)
MCV RBC AUTO: 89.5 FL (ref 81.4–97.8)
PLATELET # BLD AUTO: 54 K/UL (ref 164–446)
PMV BLD AUTO: 9.5 FL (ref 9–12.9)
POTASSIUM SERPL-SCNC: 3.3 MMOL/L (ref 3.6–5.5)
PROT SERPL-MCNC: 5.7 G/DL (ref 6–8.2)
RBC # BLD AUTO: 4 M/UL (ref 4.2–5.4)
SODIUM SERPL-SCNC: 141 MMOL/L (ref 135–145)
WBC # BLD AUTO: 4.2 K/UL (ref 4.8–10.8)

## 2017-10-04 PROCEDURE — 770006 HCHG ROOM/CARE - MED/SURG/GYN SEMI*

## 2017-10-04 PROCEDURE — 85027 COMPLETE CBC AUTOMATED: CPT

## 2017-10-04 PROCEDURE — A9270 NON-COVERED ITEM OR SERVICE: HCPCS | Performed by: INTERNAL MEDICINE

## 2017-10-04 PROCEDURE — 80053 COMPREHEN METABOLIC PANEL: CPT

## 2017-10-04 PROCEDURE — 80197 ASSAY OF TACROLIMUS: CPT

## 2017-10-04 PROCEDURE — 700111 HCHG RX REV CODE 636 W/ 250 OVERRIDE (IP): Performed by: INTERNAL MEDICINE

## 2017-10-04 PROCEDURE — 99232 SBSQ HOSP IP/OBS MODERATE 35: CPT | Performed by: INTERNAL MEDICINE

## 2017-10-04 PROCEDURE — 700102 HCHG RX REV CODE 250 W/ 637 OVERRIDE(OP): Performed by: INTERNAL MEDICINE

## 2017-10-04 PROCEDURE — 82962 GLUCOSE BLOOD TEST: CPT | Mod: 91

## 2017-10-04 PROCEDURE — 700105 HCHG RX REV CODE 258

## 2017-10-04 PROCEDURE — 36415 COLL VENOUS BLD VENIPUNCTURE: CPT

## 2017-10-04 PROCEDURE — 700101 HCHG RX REV CODE 250: Performed by: INTERNAL MEDICINE

## 2017-10-04 PROCEDURE — 83036 HEMOGLOBIN GLYCOSYLATED A1C: CPT

## 2017-10-04 RX ORDER — PREDNISONE 1 MG/1
7 TABLET ORAL DAILY
Status: DISCONTINUED | OUTPATIENT
Start: 2017-10-05 | End: 2017-10-06 | Stop reason: HOSPADM

## 2017-10-04 RX ORDER — IPRATROPIUM BROMIDE AND ALBUTEROL SULFATE 2.5; .5 MG/3ML; MG/3ML
3 SOLUTION RESPIRATORY (INHALATION)
Status: DISCONTINUED | OUTPATIENT
Start: 2017-10-04 | End: 2017-10-04

## 2017-10-04 RX ORDER — SODIUM CHLORIDE 9 MG/ML
INJECTION, SOLUTION INTRAVENOUS
Status: COMPLETED
Start: 2017-10-04 | End: 2017-10-04

## 2017-10-04 RX ORDER — PREDNISONE 1 MG/1
7 TABLET ORAL ONCE
Status: COMPLETED | OUTPATIENT
Start: 2017-10-04 | End: 2017-10-04

## 2017-10-04 RX ORDER — POTASSIUM CHLORIDE 20 MEQ/1
40 TABLET, EXTENDED RELEASE ORAL ONCE
Status: COMPLETED | OUTPATIENT
Start: 2017-10-04 | End: 2017-10-04

## 2017-10-04 RX ORDER — LEVALBUTEROL INHALATION SOLUTION 0.63 MG/3ML
0.63 SOLUTION RESPIRATORY (INHALATION)
Status: DISCONTINUED | OUTPATIENT
Start: 2017-10-04 | End: 2017-10-06 | Stop reason: HOSPADM

## 2017-10-04 RX ORDER — AMOXICILLIN 250 MG
1 CAPSULE ORAL DAILY
Status: DISCONTINUED | OUTPATIENT
Start: 2017-10-04 | End: 2017-10-04

## 2017-10-04 RX ADMIN — CEFTRIAXONE 2 G: 2 INJECTION, SOLUTION INTRAVENOUS at 17:32

## 2017-10-04 RX ADMIN — ALPRAZOLAM 0.5 MG: 0.5 TABLET ORAL at 14:18

## 2017-10-04 RX ADMIN — ALPRAZOLAM 0.5 MG: 0.5 TABLET ORAL at 09:24

## 2017-10-04 RX ADMIN — AMBRISENTAN 10 MG: 10 TABLET, FILM COATED ORAL at 09:00

## 2017-10-04 RX ADMIN — INSULIN LISPRO 1 UNITS: 100 INJECTION, SOLUTION INTRAVENOUS; SUBCUTANEOUS at 11:22

## 2017-10-04 RX ADMIN — GABAPENTIN 600 MG: 300 CAPSULE ORAL at 09:23

## 2017-10-04 RX ADMIN — MYCOPHENOLATE MOFETIL 500 MG: 250 CAPSULE ORAL at 20:19

## 2017-10-04 RX ADMIN — PRAVASTATIN SODIUM 20 MG: 10 TABLET ORAL at 09:30

## 2017-10-04 RX ADMIN — FLUTICASONE PROPIONATE 50 MCG: 50 SPRAY, METERED NASAL at 20:27

## 2017-10-04 RX ADMIN — ALPRAZOLAM 0.5 MG: 0.5 TABLET ORAL at 20:20

## 2017-10-04 RX ADMIN — OMEPRAZOLE 40 MG: 20 CAPSULE, DELAYED RELEASE ORAL at 09:24

## 2017-10-04 RX ADMIN — NYSTATIN 500000 UNITS: 100000 SUSPENSION ORAL at 09:23

## 2017-10-04 RX ADMIN — IPRATROPIUM BROMIDE AND ALBUTEROL SULFATE 3 ML: .5; 3 SOLUTION RESPIRATORY (INHALATION) at 08:02

## 2017-10-04 RX ADMIN — INSULIN LISPRO 1 UNITS: 100 INJECTION, SOLUTION INTRAVENOUS; SUBCUTANEOUS at 16:30

## 2017-10-04 RX ADMIN — STANDARDIZED SENNA CONCENTRATE AND DOCUSATE SODIUM 2 TABLET: 8.6; 5 TABLET, FILM COATED ORAL at 20:20

## 2017-10-04 RX ADMIN — MORPHINE SULFATE 15 MG: 15 TABLET ORAL at 21:33

## 2017-10-04 RX ADMIN — LACTOBACILLUS ACIDOPHILUS / LACTOBACILLUS BULGARICUS 1 PACKET: 100 MILLION CFU STRENGTH GRANULES at 11:17

## 2017-10-04 RX ADMIN — TADALAFIL 40 MG: 20 TABLET ORAL at 21:00

## 2017-10-04 RX ADMIN — Medication 220 MG: at 11:16

## 2017-10-04 RX ADMIN — IPRATROPIUM BROMIDE AND ALBUTEROL SULFATE 3 ML: .5; 3 SOLUTION RESPIRATORY (INHALATION) at 01:18

## 2017-10-04 RX ADMIN — TACROLIMUS 1.5 MG: 1 CAPSULE ORAL at 09:26

## 2017-10-04 RX ADMIN — MYCOPHENOLATE MOFETIL 500 MG: 250 CAPSULE ORAL at 09:26

## 2017-10-04 RX ADMIN — SODIUM CHLORIDE: 9 INJECTION, SOLUTION INTRAVENOUS at 16:45

## 2017-10-04 RX ADMIN — NYSTATIN 500000 UNITS: 100000 SUSPENSION ORAL at 16:26

## 2017-10-04 RX ADMIN — STANDARDIZED SENNA CONCENTRATE AND DOCUSATE SODIUM 2 TABLET: 8.6; 5 TABLET, FILM COATED ORAL at 09:24

## 2017-10-04 RX ADMIN — POTASSIUM CHLORIDE 40 MEQ: 1500 TABLET, EXTENDED RELEASE ORAL at 09:35

## 2017-10-04 RX ADMIN — TACROLIMUS 1.5 MG: 1 CAPSULE ORAL at 20:20

## 2017-10-04 RX ADMIN — DOXYCYCLINE 100 MG: 100 TABLET ORAL at 09:24

## 2017-10-04 RX ADMIN — GABAPENTIN 600 MG: 300 CAPSULE ORAL at 14:18

## 2017-10-04 RX ADMIN — FUROSEMIDE 40 MG: 40 TABLET ORAL at 06:09

## 2017-10-04 RX ADMIN — LEVOTHYROXINE SODIUM 137 MCG: 137 TABLET ORAL at 06:08

## 2017-10-04 RX ADMIN — TRAZODONE HYDROCHLORIDE 50 MG: 50 TABLET ORAL at 21:03

## 2017-10-04 RX ADMIN — GABAPENTIN 600 MG: 300 CAPSULE ORAL at 20:19

## 2017-10-04 RX ADMIN — INSULIN LISPRO 2 UNITS: 100 INJECTION, SOLUTION INTRAVENOUS; SUBCUTANEOUS at 21:04

## 2017-10-04 RX ADMIN — TIOTROPIUM BROMIDE 1 CAPSULE: 18 CAPSULE ORAL; RESPIRATORY (INHALATION) at 08:02

## 2017-10-04 RX ADMIN — HYDROMORPHONE HYDROCHLORIDE 0.25 MG: 1 INJECTION, SOLUTION INTRAMUSCULAR; INTRAVENOUS; SUBCUTANEOUS at 06:10

## 2017-10-04 RX ADMIN — INSULIN LISPRO 1 UNITS: 100 INJECTION, SOLUTION INTRAVENOUS; SUBCUTANEOUS at 06:18

## 2017-10-04 RX ADMIN — SERTRALINE 100 MG: 100 TABLET, FILM COATED ORAL at 20:19

## 2017-10-04 RX ADMIN — NYSTATIN 500000 UNITS: 100000 SUSPENSION ORAL at 21:03

## 2017-10-04 RX ADMIN — OXYCODONE HYDROCHLORIDE AND ACETAMINOPHEN 500 MG: 500 TABLET ORAL at 09:24

## 2017-10-04 RX ADMIN — LACTOBACILLUS ACIDOPHILUS / LACTOBACILLUS BULGARICUS 1 PACKET: 100 MILLION CFU STRENGTH GRANULES at 06:09

## 2017-10-04 RX ADMIN — DOXYCYCLINE 100 MG: 100 TABLET ORAL at 20:20

## 2017-10-04 RX ADMIN — HYDROMORPHONE HYDROCHLORIDE 0.25 MG: 1 INJECTION, SOLUTION INTRAMUSCULAR; INTRAVENOUS; SUBCUTANEOUS at 14:18

## 2017-10-04 RX ADMIN — PREDNISONE 7 MG: 1 TABLET ORAL at 11:16

## 2017-10-04 RX ADMIN — ENOXAPARIN SODIUM 40 MG: 100 INJECTION SUBCUTANEOUS at 11:16

## 2017-10-04 RX ADMIN — MORPHINE SULFATE 15 MG: 15 TABLET ORAL at 09:35

## 2017-10-04 RX ADMIN — FLUTICASONE PROPIONATE 50 MCG: 50 SPRAY, METERED NASAL at 09:28

## 2017-10-04 RX ADMIN — LACTOBACILLUS ACIDOPHILUS / LACTOBACILLUS BULGARICUS 1 PACKET: 100 MILLION CFU STRENGTH GRANULES at 16:26

## 2017-10-04 RX ADMIN — MORPHINE SULFATE 15 MG: 15 TABLET ORAL at 17:35

## 2017-10-04 RX ADMIN — ASPIRIN 81 MG: 81 TABLET, COATED ORAL at 09:24

## 2017-10-04 ASSESSMENT — ENCOUNTER SYMPTOMS
HEADACHES: 0
DIARRHEA: 0
NAUSEA: 0
SORE THROAT: 0
PALPITATIONS: 0
COUGH: 1
DIZZINESS: 0
MYALGIAS: 0
ABDOMINAL PAIN: 0
SPUTUM PRODUCTION: 0
DEPRESSION: 0
FEVER: 0
CHILLS: 1
BLOOD IN STOOL: 0
SHORTNESS OF BREATH: 0
CONSTIPATION: 0

## 2017-10-04 ASSESSMENT — PAIN SCALES - GENERAL
PAINLEVEL_OUTOF10: 7
PAINLEVEL_OUTOF10: 4
PAINLEVEL_OUTOF10: 4
PAINLEVEL_OUTOF10: 8
PAINLEVEL_OUTOF10: 0
PAINLEVEL_OUTOF10: 7
PAINLEVEL_OUTOF10: 3
PAINLEVEL_OUTOF10: 8

## 2017-10-04 ASSESSMENT — LIFESTYLE VARIABLES: EVER_SMOKED: NEVER

## 2017-10-04 NOTE — CARE PLAN
Problem: Pain Management  Goal: Pain level will decrease to patient's comfort goal  Patient medicated once for pain rated at 7/10.  Patient resting comfortably.

## 2017-10-04 NOTE — CARE PLAN
Problem: Safety  Goal: Will remain free from injury  Outcome: PROGRESSING AS EXPECTED  Safety maintainted. Bed low and locked position, call light with in reach, hourly rounding, tread socks on, patients belongings within reach, and pt instructed to call before getting out of bed.

## 2017-10-04 NOTE — CARE PLAN
Problem: Safety  Goal: Will remain free from falls  Patient is compliant with call bell.  Patient waits for staff assistance.  Patient is steady on her feet.

## 2017-10-04 NOTE — H&P
" Hospital Medicine History and Physical    Date of Service  10/3/2017    Chief Complaint  Chief Complaint   Patient presents with   • LUQ Pain     x 2 days       History of Presenting Illness  57 y.o. female who presented 10/3/2017 with cough, wheezing , chills. Started last night with cough, productive of nonbloody sputum, though she swallows it most of the time. She developed chills with continued coughing and associated LUQ pain \"spleen pain\" when she coughs. She felt she has developing pneumonia. Denied lightheadedness. She denied rash, diarrhea or dysuria. No sick contacts, recent travel. She has significant pulmonary history including sarcoidoisis, pulmonary hypertension on Lasix and COPD, as well as chronic O2. She has diabetes, but now is diet controlled thanks to weight loss however is on steroids for immunosuppression. She is a liver transplant and is on said steroids and Cellcept, tacrolimus. She has a history of Cdiff diarrhea on probiotics but is currently constipated and is on Linzess.  At the ED, she is afebrile and hemodynamically stable. CXR showed bibasilar infiltartes could be pneumonia or edema/effusion. No leukocytosis, White count normal.   When I saw her at ED, she is in no acute distress. Expiratory wheezes and crackles noted. Trace lower extremity edema.  Primary Care Physician  Bernarda Damon D.O.    Consultants      Code Status  Full    Review of Systems  Review of Systems   Constitutional: Positive for chills. Negative for fever.   HENT: Negative for congestion, hearing loss and nosebleeds.    Eyes: Negative for pain and redness.   Respiratory: Positive for cough, sputum production, shortness of breath and wheezing. Negative for hemoptysis.    Cardiovascular: Negative for chest pain and palpitations.   Gastrointestinal: Positive for constipation. Negative for abdominal pain, blood in stool, diarrhea, nausea and vomiting.   Genitourinary: Negative for dysuria, frequency and " "hematuria.   Musculoskeletal: Positive for myalgias. Negative for falls and joint pain.   Skin: Negative for rash.   Neurological: Negative for dizziness, tremors, focal weakness, seizures, loss of consciousness, weakness and headaches.   Psychiatric/Behavioral: The patient is not nervous/anxious and does not have insomnia.    All other systems reviewed and are negative.       Past Medical History  Past Medical History:   Diagnosis Date   • Low back pain 02-17-17    and left foot, 7/10   • H/O Clostridium difficile infection 02-17-17    reports \"16 months ago\". Current stool sample 01-26-17 neg   • Cirrhosis (CMS-HCC) December 2011    [place] [facility] [place]   • Breath shortness    • Bronchitis      2016   • Cardiomegaly    • Chronic fatigue and malaise    • CKD (chronic kidney disease) stage 3, GFR 30-59 ml/min    • Diabetes (CMS-HCC)     reports hx of, resolved w/weight loss. Reports still checking bloodsugars twice daily and using insulin PRN   • Fracture of left foot    • GERD (gastroesophageal reflux disease)         • Hypothyroid    • Mild aortic regurgitation and aortic valve sclerosis     • On home oxygen therapy     4 liters, Dr. Gaming   • Platelet disorder (CMS-HCC)     low platelets   • Pneumonia     aspiration,    • Psychiatric disorder     Mood disorder   • Pulmonary hypertension        • S/P cholecystectomy    • Small bowel obstruction     01-   • Splenomegaly    • VRE (vancomycin-resistant Enterococci)     02-17-17, pt declines knowledge of this       Surgical History  Past Surgical History:   Procedure Laterality Date   • COLONOSCOPY N/A 3/27/2017    Procedure: COLONOSCOPY;  Surgeon: Osman Matt M.D.;  Location: SURGERY SAME DAY [facility];  Service:    • GASTROSCOPY N/A 3/27/2017    Procedure: GASTROSCOPY;  Surgeon: Osman Matt M.D.;  Location: SURGERY SAME DAY [facility];  Service:    • BREAST BIOPSY Left 2/21/2017    Procedure: BREAST BIOPSY - WIRE " LOCALIZED EXCISONAL ;  Surgeon: Jane Zhao M.D.;  Location: SURGERY SAME DAY [facility];  Service:    • LUNG BIOPSY OPEN  11/2016   • OTHER ABDOMINAL SURGERY      Gasric Sleeve   • BONE MARROW ASPIRATION  3/16/2015    Performed by Marlena Hi M.D. at ENDOSCOPY [facility]   • BONE MARROW BIOPSY, NDL/TROCAR  3/16/2015    Performed by Marlena Hi M.D. at ENDOSCOPY [facility]   • RECOVERY  3/31/2014    Performed by Ir-Recovery Surgery at SURGERY [facility]   • RECOVERY  3/24/2014    Performed by Cath-Recovery Surgery at SURGERY SAME DAY [facility]   • RECOVERY  1/21/2014    Performed by Ir[facility]Recovery Surgery at SURGERY SAME DAY [facility]   • BRONCHOSCOPY-ENDO  11/15/2013    Performed by Ha Perez M.D. at ENDOSCOPY [facility]   • RECOVERY  2/27/2013    Performed by Ir[facility]Recovery Surgery at SURGERY SAME DAY [facility]   • BONE MARROW ASPIRATION  12/31/2012    Performed by Josemanuel Real M.D. at ENDOSCOPY [facility]   • BONE MARROW BIOPSY, NDL/TROCAR  12/31/2012    Performed by Josemanuel Real M.D. at ENDOSCOPY [facility]   • GASTROSCOPY  9/28/2012    Performed by Aravind Richards M.D. at SURGERY [facility]   • SIGMOIDOSCOPY FLEX  9/26/2012    Performed by Kristopher Cardoso M.D. at ENDOSCOPY [facility]   • BRONCHOSCOPY-ENDO  6/19/2012    Performed by MARLENA MURILLO at ENDOSCOPY [facility]   • BRONCHOSCOPY-ENDO  5/29/2012    Performed by SUYAPA CAMARENA at ENDOSCOPY [facility]   • OTHER ABDOMINAL SURGERY  December 2011    [place] [facility] [place]. [place].   • GASTROSCOPY-ENDO  3/12/2010    Performed by CAMELIA SAMANO at [facility]   • EXAM UNDER ANESTHESIA  11/11/2009    Performed by BIRD ABDI at SURGERY [facility]   • HEMORRHOIDECTOMY  11/11/2009    Performed by BIRD ABDI at SURGERY [facility]   • KELBY BY LAPAROSCOPY  9/19/2009    Performed by BIRD ABDI at [facility]  ORS   • CARPAL TUNNEL RELEASE          • HYSTERECTOMY, TOTAL ABDOMINAL          • OTHER      Breast reduction   • PB ANESTH,NOSE,SINUS SURGERY     • TONSILLECTOMY         Medications  No current facility-administered medications on file prior to encounter.      Current Outpatient Prescriptions on File Prior to Encounter   Medication Sig Dispense Refill   • ambrisentan (LETAIRIS) 10 MG tablet Take 1 Tab by mouth every day. 30 Tab 11   • pravastatin (PRAVACHOL) 20 MG Tab TAKE 1 TAB BY MOUTH EVERY DAY. 30 Tab 11   • fluticasone (FLONASE) 50 MCG/ACT nasal spray SPRAY 1 SPRAY IN EACH NOSTRIL TWICE A DAY 48 g 1   • trazodone (DESYREL) 50 MG Tab TAKE 1-2 TABS BY MOUTH EVERY BEDTIME. 90 Tab 2   • Tadalafil, PAH, (ADCIRCA) 20 MG Tab Take 40 mg by mouth every bedtime. Indications: Pulmonary Arterial Hypertension 180 Tab 3   • oxycodone immediate release (ROXICODONE) 10 MG immediate release tablet Take 1-3 Tabs by mouth every 6 hours as needed for Moderate Pain. 90 Tab 0   • morphine (MS IR) 15 MG tablet Take 1 Tab by mouth every four hours as needed for Severe Pain. 30 Tab 0   • alprazolam (XANAX) 0.5 MG Tab Take 1 Tab by mouth 3 times a day. 90 Tab 2   • furosemide (LASIX) 40 MG Tab TAKE 1 TAB BY MOUTH EVERY DAY. 30 Tab 6   • cyclobenzaprine (FLEXERIL) 10 MG Tab Take 10 mg by mouth 3 times a day as needed for Muscle Spasms.     • ferrous sulfate (FEOSOL) 220 (44 FE) MG/5ML Elixir Take 5 mL by mouth every day. 1 Bottle 10   • Linaclotide 290 MCG Cap Take 290 mg by mouth every day. 90 Cap 3   • CITRACAL MAXIMUM 315-250 MG-UNIT Tab TAKE 2 TABS BY MOUTH 2X/ DAY WITH FOOD BEFORE AND AFTER DINNER FOR LIFE-CRUSH 1ST 3 MONTH, SPACE  Tab 11   • tacrolimus (PROGRAF) 0.5 MG Cap Take 0.5 mg by mouth 2 times a day. Take with 1 mg = 1.5 mg BID     • ascorbic acid (ASCORBIC ACID) 500 MG Tab Take 500 mg by mouth every day.     • sennosides (SENOKOT) 8.6 MG Tab Take 17.2 mg by mouth 2 times a day.     • Probiotic Product (PROBIOTIC  DAILY PO) Take 1 Cap by mouth every day.     • aspirin EC (ECOTRIN) 81 MG Tablet Delayed Response Take 1 Tab by mouth every morning. 90 Tab 4   • gabapentin (NEURONTIN) 600 MG tablet Take 1 Tab by mouth 3 times a day. 270 Tab 4   • omeprazole (PRILOSEC) 40 MG delayed-release capsule Take 1 Cap by mouth every day. 90 Cap 4   • ondansetron (ZOFRAN ODT) 4 MG TABLET DISPERSIBLE Take 1 Tab by mouth every 8 hours as needed for Nausea/Vomiting. Nausea and vomiting 270 Tab 4   • tiotropium (SPIRIVA) 18 MCG Cap Inhale 1 Cap by mouth every day. 90 Cap 4   • predniSONE (DELTASONE) 5 MG Tab Take 7.5 mg by mouth every morning.     • sertraline (ZOLOFT) 100 MG Tab Take 100 mg by mouth every bedtime.     • docusate sodium (COLACE) 100 MG Cap Take 100 mg by mouth every day.     • mycophenolate (CELLCEPT) 250 MG Cap Take 500 mg by mouth 2 times a day.         Family History  Family History   Problem Relation Age of Onset   • Other Father      Unknown (dead 10 years)   • Diabetes Father    • Heart Disease Father    • Hypertension Father    • Hyperlipidemia Father    • Stroke Father    • Non-contributory Mother    • Hyperlipidemia Mother    • Alcohol/Drug Mother    • Cancer Paternal Aunt    • Alcohol/Drug Maternal Grandmother    • Alcohol/Drug Maternal Grandfather        Social History  Social History   Substance Use Topics   • Smoking status: Passive Smoke Exposure - Never Smoker   • Smokeless tobacco: Never Used      Comment: avoid all tobacco products   • Alcohol use No      Comment: 2009 quit drinking       Allergies  Allergies   Allergen Reactions   • Rhubarb Anaphylaxis   • Organic Nitrates      Nitroglycerin products should be avoided with the use of PDE-5 inhibitors as the combination can result in severe hypotension.   • Vancomycin Hcl      Causes red man syndrome when infused to fast          Physical Exam  Laboratory   Hemodynamics  Temp (24hrs), Av.9 °C (98.4 °F), Min:36.9 °C (98.4 °F), Max:36.9 °C (98.4 °F)    Temperature: 36.9 °C (98.4 °F)  Pulse  Av  Min: 75  Max: 75    Blood Pressure: 111/55      Respiratory      Respiration: 14, Pulse Oximetry: 98 %             Physical Exam   Constitutional: She appears well-developed and well-nourished.   HENT:   Head: Normocephalic and atraumatic.   Eyes: Conjunctivae and EOM are normal. No scleral icterus.   Neck: Normal range of motion. Neck supple.   Cardiovascular: Normal rate and regular rhythm.  Exam reveals no gallop and no friction rub.    No murmur heard.  Pulmonary/Chest: Effort normal. No respiratory distress. She has wheezes. She has rales.   Abdominal: Soft. Bowel sounds are normal. She exhibits no distension. There is no tenderness. There is no rebound and no guarding.   Musculoskeletal: She exhibits edema (trace). She exhibits no tenderness.   Neurological: She is alert.   Skin: Skin is warm.   Psychiatric: She has a normal mood and affect. Her behavior is normal.       Recent Labs      10/03/17   1243   WBC  6.8   RBC  4.18*   HEMOGLOBIN  12.5   HEMATOCRIT  38.0   MCV  90.9   MCH  29.9   MCHC  32.9*   RDW  45.9   PLATELETCT  66*   MPV  9.0     Recent Labs      10/03/17   1243   SODIUM  137   POTASSIUM  4.5   CHLORIDE  99   CO2  33   GLUCOSE  192*   BUN  17   CREATININE  1.08   CALCIUM  9.6     Recent Labs      10/03/17   1243   ALTSGPT  14   ASTSGOT  16   ALKPHOSPHAT  31   TBILIRUBIN  0.3   LIPASE  14   GLUCOSE  192*         Recent Labs      10/03/17   1243   BNPBTYPENAT  56         Lab Results   Component Value Date    TROPONINI <0.01 2016     Urinalysis:    Lab Results  Component Value Date/Time   SPECGRAVITY 1.005 10/03/2017 1545   GLUCOSEUR Negative 10/03/2017 1545   KETONES Negative 10/03/2017 1545   NITRITE Negative 10/03/2017 1545   WBCURINE 10-20 (A) 05/10/2017 0810   RBCURINE 0-2 05/10/2017 0810   BACTERIA Few (A) 05/10/2017 0810   EPITHELCELL Few 05/10/2017 0810        Imaging  Dx-chest-limited (1 View)    Result Date: 10/3/2017  10/3/2017 2:28  PM HISTORY/REASON FOR EXAM:  Cough. Shortness of breath. TECHNIQUE/EXAM DESCRIPTION AND NUMBER OF VIEWS: Single AP view of the chest. COMPARISON: None FINDINGS: Lines/tubes:  None. Lungs: There is slightly increased interstitial opacity in each lower lobe which represent minimal interstitial edema or pneumonia. No lobar consolidation is present. Pleura:  There is no pneumothorax. A small left pleural effusion is present. Heart and mediastinum:  The heart silhouette is within normal limits. Bones:  There is sigmoid-shaped scoliosis of the thoracic spine with the primary curve convex right inferiorly.     Minimal bibasilar interstitial edema or pneumonia with small left pleural effusion.     Assessment/Plan     I anticipate this patient will require at least two midnights for appropriate medical management, necessitating inpatient admission.    * HCAP (healthcare-associated pneumonia)- (present on admission)   Assessment & Plan    Presented with cough, shortness of breath        Acute on chronic respiratory failure with hypoxia (CMS-HCC)- (present on admission)   Assessment & Plan    On chronic O2 5L        Pure hypercholesterolemia- (present on admission)   Assessment & Plan    Continue statin        Chronic obstructive pulmonary disease with acute exacerbation (CMS-HCC)- (present on admission)   Assessment & Plan    Oxygen per protocol. PRN and scheduled nebulizers. PO steroids.        Sarcoidosis (CMS-HCC)- (present on admission)   Assessment & Plan    History and diagnosis of. Currently stable, is on chronic immunosuppression anyway        Status post liver transplant Dr. Canada ([facility] [place])- (present on admission)   Assessment & Plan    Continue her immunosuppressants and get a tacrolimus level        Steroid-induced hyperglycemia   Assessment & Plan    On chronic prednisone  Obtain A1c  Add sliding scale        History of Clostridium difficile infection- (present on admission)   Assessment & Plan    Despite  immunosuppression, was on probiotics. Continue probiotics.        IDDM (insulin dependent diabetes mellitus) (CMS-HCC)- (present on admission)   Assessment & Plan    Used to be on insulin, but now diet controlled thanks to weight loss  Obtain A1c  Add sliding scale        Chronic use of opiate drugs therapeutic purposes- (present on admission)   Assessment & Plan    Continue her home pain medications; PRN for cough related discomfort        [place] [place])   Assessment & Plan    Transplant patient, on immusuppressants and steroids. Proper hygenine and isolation procedures            VTE prophylaxis: SCD.    I spent 73 minutes, reviewing the chart, notes, vitals, labs, imaging, ordering labs, evaluating Roxana Cuba for assessment, enacting the plan above. 50% of the time was spent in counseling Roxana Cuba and answering questions. Discuss with ED physician. Medical decision making is therefore complex. Time was devoted to counseling and coordinating care including review of records, pertinent lab data and studies, as well as discussing diagnostic evaluation and work up, planned therapeutic interventions and future disposition of care. Where indicated, the assessment and plan reflect discussion of patient with consultants, other healthcare providers, family members, and additional research needed to obtain further information in formulating the plan of care for Roxana Cuba.

## 2017-10-04 NOTE — DISCHARGE PLANNING
Care Transition Team Assessment    IHD met with pt at bedside. Pt currently lives at home with her SO, who will be providing transportation home upon D/C. Pt does not have any home health services at this time, but does use home O2 provided by [employer]. Pt states that she will periodically use a walker, cane, or wheelchair at home dependent on weakness.     Information Source  Orientation : Oriented x 4  Information Given By: Patient  Informant's Name: Roxana  Who is responsible for making decisions for patient? : Patient         Elopement Risk  Legal Hold: No  Ambulatory or Self Mobile in Wheelchair: Yes  Disoriented: No  Psychiatric Symptoms: None  History of Wandering: No  Elopement this Admit: No  Vocalizing Wanting to Leave: No  Displays Behaviors, Body Language Wanting to Leave: No-Not at Risk for Elopement    Interdisciplinary Discharge Planning  Does Admitting Nurse Feel This Could be a Complex Discharge?: No  Primary Care Physician: Dr Reyes  Lives with - Patient's Self Care Capacity: Spouse  Support Systems: Spouse / Significant Other, Children  Housing / Facility: [address]  Do You Take your Prescribed Medications Regularly: Yes  Able to Return to Previous ADL's: Yes  Mobility Issues: No  Prior Services: None  Patient Expects to be Discharged to:: home  Assistance Needed: No    Discharge Preparedness  What is your plan after discharge?: Home with help  What are your discharge supports?: Spouse  Prior Functional Level: Ambulatory, Drives Self, Independent with Activities of Daily Living, Independent with Medication Management  Difficulity with ADLs: Walking  Difficulty with ADLs Comment: periodically uses walker, cane, or wheelchiar based on weakness   Difficulity with IADLs: None    Functional Assesment  Prior Functional Level: Ambulatory, Drives Self, Independent with Activities of Daily Living, Independent with Medication Management    Finances  Financial Barriers to Discharge: No  Prescription  Coverage: Yes ([facility] [address] )    Vision / Hearing Impairment  Vision Impairment : No  Hearing Impairment : No    Values / Beliefs / Concerns  Values / Beliefs Concerns : No  Special Hospitalization Concerns:  (no)         Domestic Abuse  Have you ever been the victim of abuse or violence?: No  Physical Abuse or Sexual Abuse: No  Verbal Abuse or Emotional Abuse: No  Possible Abuse Reported to:: Not Applicable    Psychological Assessment  History of Substance Abuse: None  History of Psychiatric Problems: No  Non-compliant with Treatment: No    Discharge Risks or Barriers  Discharge risks or barriers?: No    Anticipated Discharge Information  Anticipated discharge disposition: Home  Discharge Address[facility] [address]   Discharge Contact Phone Number: [phone]

## 2017-10-04 NOTE — RESPIRATORY CARE
COPD EDUCATION by COPD CLINICAL EDUCATOR  10/4/2017 at 8:16 AM by Maci Velásquez     Patient reviewed by COPD education team. Patient does not qualify for COPD program.

## 2017-10-04 NOTE — PROGRESS NOTES
[facility] Hospitalist Progress Note    Date of Service: 10/4/2017    Chief Complaint  57 y.o. female admitted 10/3/2017 with past medical for liver transplant in 2011 here with healthcare acquired pneumonia.    Interval Problem Update  Patient complains of cough but denies any sputum production. She complains of chills. Patient is at baseline supplemental O2 at 5 L.    Consultants/Specialty  None    Disposition  Likely home once medically improve        Review of Systems   Constitutional: Positive for chills. Negative for fever.   HENT: Negative for congestion and sore throat.    Respiratory: Positive for cough. Negative for sputum production and shortness of breath.    Cardiovascular: Negative for chest pain, palpitations and leg swelling.   Gastrointestinal: Negative for abdominal pain, blood in stool, constipation, diarrhea, melena and nausea.   Genitourinary: Negative for dysuria, frequency and hematuria.   Musculoskeletal: Negative for myalgias.   Neurological: Negative for dizziness and headaches.   Psychiatric/Behavioral: Negative for depression.      Physical Exam  Laboratory/Imaging   Hemodynamics  Temp (24hrs), Av.8 °C (98.2 °F), Min:36.3 °C (97.3 °F), Max:37.1 °C (98.7 °F)   Temperature: 36.8 °C (98.2 °F)  Pulse  Av.9  Min: 61  Max: 91 Heart Rate (Monitored): 71  Blood Pressure: (!) 98/63, NIBP: 142/77      Respiratory      Respiration: 16, Pulse Oximetry: 98 %, O2 Daily Delivery Respiratory : Silicone Nasal Cannula     #MDI/DPI Given: MDI/DPI x 1, Work Of Breathing / Effort: Mild  RUL Breath Sounds: Diminished, RML Breath Sounds: Diminished, RLL Breath Sounds: Diminished, MANJINDER Breath Sounds: Diminished, LLL Breath Sounds: Diminished    Fluids    Intake/Output Summary (Last 24 hours) at 10/04/17 1012  Last data filed at 10/04/17 0600   Gross per 24 hour   Intake              180 ml   Output                1 ml   Net              179 ml       Nutrition  Orders Placed This Encounter    Procedures   • Diet Order     Standing Status:   Standing     Number of Occurrences:   1     Order Specific Question:   Diet:     Answer:   Hepatic [9]     Physical Exam   Constitutional: She is oriented to person, place, and time. She appears well-developed and well-nourished. No distress.   HENT:   Head: Normocephalic and atraumatic.   Mouth/Throat: Oropharynx is clear and moist.   Eyes: Conjunctivae and EOM are normal. Right eye exhibits no discharge. Left eye exhibits no discharge.   Neck: Neck supple.   Cardiovascular: Normal rate and regular rhythm.    No murmur heard.  Pulmonary/Chest: Effort normal. No respiratory distress. She has no wheezes.   Decreased breath sounds bilateral bases   Abdominal: Soft. She exhibits no distension. There is no tenderness. There is no rebound.   Musculoskeletal: She exhibits no edema.   Lymphadenopathy:     She has no cervical adenopathy.   Neurological: She is alert and oriented to person, place, and time. No cranial nerve deficit.   Skin: No rash noted. She is not diaphoretic. No erythema.       Recent Labs      10/03/17   1243  10/04/17   0137   WBC  6.8  4.2*   RBC  4.18*  4.00*   HEMOGLOBIN  12.5  11.8*   HEMATOCRIT  38.0  35.8*   MCV  90.9  89.5   MCH  29.9  29.5   MCHC  32.9*  33.0*   RDW  45.9  45.0   PLATELETCT  66*  54*   MPV  9.0  9.5     Recent Labs      10/03/17   1243  10/04/17   0137   SODIUM  137  141   POTASSIUM  4.5  3.3*   CHLORIDE  99  100   CO2  33  35*   GLUCOSE  192*  186*   BUN  17 17   CREATININE  1.08  1.12   CALCIUM  9.6  8.7         Recent Labs      10/03/17   1243   BNPBTYPENAT  56              Assessment/Plan     * HCAP (healthcare-associated pneumonia)- (present on admission)   Assessment & Plan    Pt on ceftriaxone and doxycycline  Monitor culture results        Acute on chronic respiratory failure with hypoxia (CMS-HCC)- (present on admission)   Assessment & Plan    On chronic O2 5L, at baseline        Pure hypercholesterolemia- (present on  admission)   Assessment & Plan    Continue statin        Chronic obstructive pulmonary disease with acute exacerbation (CMS-HCC)- (present on admission)   Assessment & Plan    Oxygen per protocol. PRN and scheduled nebulizers. RT protocol  No acute exacerbation        Sarcoidosis (CMS-HCC)- (present on admission)   Assessment & Plan    History and diagnosis of. currently stable        Status post liver transplant Dr. Canada ([facility])- (present on admission)   Assessment & Plan    S/p liver transplant at [facility] in Dec 2011  Continue on Cellcept and prednisone        Steroid-induced hyperglycemia   Assessment & Plan    On chronic prednisone, s/p liver transplant  A1c 5.2 (Jan 2017), on ISS         History of Clostridium difficile infection- (present on admission)   Assessment & Plan    Despite immunosuppression, was on probiotics. Continue probiotics.        IDDM (insulin dependent diabetes mellitus) (CMS-HCC)- (present on admission)   Assessment & Plan    Used to be on insulin, but now diet controlled thanks to weight loss  A1c pending  On insulin sliding scale        Chronic use of opiate drugs therapeutic purposes- (present on admission)   Assessment & Plan    Continue her home pain medications; PRN for cough related discomfort        Hypokalemia- (present on admission)   Assessment & Plan    Replete, bmp tomm am         GERD (gastroesophageal reflux disease)- (present on admission)   Assessment & Plan    Cont pt on PPI        [place] (present on [place]   Assessment & Plan    Transplant patient, on immusuppressants and steroids. Proper hygenine and isolation procedures        Pulmonary hypertension- (present on admission)   Assessment & Plan    Cont patient on tadalafil  Echo Jan 2017 showed EF 65%, RVSP 35 mmHg.            Reviewed items::  Labs reviewed and Medications reviewed  Holbrook catheter::  No Holbrook  DVT prophylaxis pharmacological::  Enoxaparin (Lovenox)  Ulcer  Prophylaxis::  Yes  Antibiotics:  Treating active infection/contamination beyond 24 hours perioperative coverage

## 2017-10-04 NOTE — ASSESSMENT & PLAN NOTE
[place] [facility] [place]  Continue on Cellcept and prednisone

## 2017-10-04 NOTE — ED NOTES
"Pt states she needs a private room due to liver transplant 5 years ago at [facility]. I called bed control to see if there is a policy that would qualify her for a single room. Per bed control. \"fresh\" transplant 6-1 year old typically get a private room. [facility] charge nurse notified that pt refused transport to [email or web address] Bruna called Mago, ED director and [facility] for clarification and direction. Per [facility] pt can go to a private room on ortho floor. Pt updated on POC   "

## 2017-10-05 ENCOUNTER — PATIENT OUTREACH (OUTPATIENT)
Dept: HEALTH INFORMATION MANAGEMENT | Facility: OTHER | Age: 57
End: 2017-10-05

## 2017-10-05 LAB
ANION GAP SERPL CALC-SCNC: 5 MMOL/L (ref 0–11.9)
BUN SERPL-MCNC: 20 MG/DL (ref 8–22)
CALCIUM SERPL-MCNC: 9.2 MG/DL (ref 8.5–10.5)
CHLORIDE SERPL-SCNC: 102 MMOL/L (ref 96–112)
CO2 SERPL-SCNC: 34 MMOL/L (ref 20–33)
CREAT SERPL-MCNC: 1.14 MG/DL (ref 0.5–1.4)
ERYTHROCYTE [DISTWIDTH] IN BLOOD BY AUTOMATED COUNT: 46.1 FL (ref 35.9–50)
GFR SERPL CREATININE-BSD FRML MDRD: 49 ML/MIN/1.73 M 2
GLUCOSE BLD-MCNC: 111 MG/DL (ref 65–99)
GLUCOSE BLD-MCNC: 128 MG/DL (ref 65–99)
GLUCOSE BLD-MCNC: 138 MG/DL (ref 65–99)
GLUCOSE SERPL-MCNC: 95 MG/DL (ref 65–99)
HCT VFR BLD AUTO: 37.1 % (ref 37–47)
HGB BLD-MCNC: 11.8 G/DL (ref 12–16)
MCH RBC QN AUTO: 28.7 PG (ref 27–33)
MCHC RBC AUTO-ENTMCNC: 31.8 G/DL (ref 33.6–35)
MCV RBC AUTO: 90.3 FL (ref 81.4–97.8)
PLATELET # BLD AUTO: 66 K/UL (ref 164–446)
PMV BLD AUTO: 9.3 FL (ref 9–12.9)
POTASSIUM SERPL-SCNC: 4.4 MMOL/L (ref 3.6–5.5)
PROCALCITONIN SERPL-MCNC: 0.12 NG/ML
RBC # BLD AUTO: 4.11 M/UL (ref 4.2–5.4)
SODIUM SERPL-SCNC: 141 MMOL/L (ref 135–145)
WBC # BLD AUTO: 3.5 K/UL (ref 4.8–10.8)

## 2017-10-05 PROCEDURE — 80048 BASIC METABOLIC PNL TOTAL CA: CPT

## 2017-10-05 PROCEDURE — 700102 HCHG RX REV CODE 250 W/ 637 OVERRIDE(OP): Performed by: INTERNAL MEDICINE

## 2017-10-05 PROCEDURE — 85027 COMPLETE CBC AUTOMATED: CPT

## 2017-10-05 PROCEDURE — 700111 HCHG RX REV CODE 636 W/ 250 OVERRIDE (IP): Performed by: INTERNAL MEDICINE

## 2017-10-05 PROCEDURE — 82962 GLUCOSE BLOOD TEST: CPT | Mod: 91

## 2017-10-05 PROCEDURE — 99232 SBSQ HOSP IP/OBS MODERATE 35: CPT | Performed by: INTERNAL MEDICINE

## 2017-10-05 PROCEDURE — A9270 NON-COVERED ITEM OR SERVICE: HCPCS | Performed by: INTERNAL MEDICINE

## 2017-10-05 PROCEDURE — 770006 HCHG ROOM/CARE - MED/SURG/GYN SEMI*

## 2017-10-05 PROCEDURE — 36415 COLL VENOUS BLD VENIPUNCTURE: CPT

## 2017-10-05 PROCEDURE — 84145 PROCALCITONIN (PCT): CPT

## 2017-10-05 RX ADMIN — MORPHINE SULFATE 15 MG: 15 TABLET ORAL at 04:15

## 2017-10-05 RX ADMIN — ALPRAZOLAM 0.5 MG: 0.5 TABLET ORAL at 22:14

## 2017-10-05 RX ADMIN — OXYCODONE HYDROCHLORIDE AND ACETAMINOPHEN 500 MG: 500 TABLET ORAL at 08:00

## 2017-10-05 RX ADMIN — AMBRISENTAN 10 MG: 10 TABLET, FILM COATED ORAL at 08:01

## 2017-10-05 RX ADMIN — PREDNISONE 7 MG: 1 TABLET ORAL at 10:09

## 2017-10-05 RX ADMIN — DOXYCYCLINE 100 MG: 100 TABLET ORAL at 08:00

## 2017-10-05 RX ADMIN — FLUTICASONE PROPIONATE 50 MCG: 50 SPRAY, METERED NASAL at 08:01

## 2017-10-05 RX ADMIN — TADALAFIL 40 MG: 20 TABLET ORAL at 21:00

## 2017-10-05 RX ADMIN — TACROLIMUS 1.5 MG: 1 CAPSULE ORAL at 08:00

## 2017-10-05 RX ADMIN — TIOTROPIUM BROMIDE 1 CAPSULE: 18 CAPSULE ORAL; RESPIRATORY (INHALATION) at 08:03

## 2017-10-05 RX ADMIN — FLUTICASONE PROPIONATE 50 MCG: 50 SPRAY, METERED NASAL at 22:15

## 2017-10-05 RX ADMIN — LACTOBACILLUS ACIDOPHILUS / LACTOBACILLUS BULGARICUS 1 PACKET: 100 MILLION CFU STRENGTH GRANULES at 07:59

## 2017-10-05 RX ADMIN — MORPHINE SULFATE 15 MG: 15 TABLET ORAL at 09:29

## 2017-10-05 RX ADMIN — MORPHINE SULFATE 15 MG: 15 TABLET ORAL at 14:40

## 2017-10-05 RX ADMIN — HYDROMORPHONE HYDROCHLORIDE 0.25 MG: 1 INJECTION, SOLUTION INTRAMUSCULAR; INTRAVENOUS; SUBCUTANEOUS at 13:12

## 2017-10-05 RX ADMIN — CEFTRIAXONE 2 G: 2 INJECTION, SOLUTION INTRAVENOUS at 18:05

## 2017-10-05 RX ADMIN — LEVOTHYROXINE SODIUM 137 MCG: 137 TABLET ORAL at 06:05

## 2017-10-05 RX ADMIN — LACTOBACILLUS ACIDOPHILUS / LACTOBACILLUS BULGARICUS 1 PACKET: 100 MILLION CFU STRENGTH GRANULES at 16:45

## 2017-10-05 RX ADMIN — Medication 220 MG: at 07:59

## 2017-10-05 RX ADMIN — STANDARDIZED SENNA CONCENTRATE AND DOCUSATE SODIUM 2 TABLET: 8.6; 5 TABLET, FILM COATED ORAL at 07:59

## 2017-10-05 RX ADMIN — MYCOPHENOLATE MOFETIL 500 MG: 250 CAPSULE ORAL at 22:17

## 2017-10-05 RX ADMIN — SERTRALINE 100 MG: 100 TABLET, FILM COATED ORAL at 23:11

## 2017-10-05 RX ADMIN — HYDROMORPHONE HYDROCHLORIDE 0.25 MG: 1 INJECTION, SOLUTION INTRAMUSCULAR; INTRAVENOUS; SUBCUTANEOUS at 06:35

## 2017-10-05 RX ADMIN — ONDANSETRON 4 MG: 4 TABLET, ORALLY DISINTEGRATING ORAL at 13:12

## 2017-10-05 RX ADMIN — MYCOPHENOLATE MOFETIL 500 MG: 250 CAPSULE ORAL at 08:00

## 2017-10-05 RX ADMIN — TRAZODONE HYDROCHLORIDE 50 MG: 50 TABLET ORAL at 23:10

## 2017-10-05 RX ADMIN — DOXYCYCLINE 100 MG: 100 TABLET ORAL at 22:14

## 2017-10-05 RX ADMIN — HYDROMORPHONE HYDROCHLORIDE 0.25 MG: 1 INJECTION, SOLUTION INTRAMUSCULAR; INTRAVENOUS; SUBCUTANEOUS at 00:20

## 2017-10-05 RX ADMIN — NYSTATIN 500000 UNITS: 100000 SUSPENSION ORAL at 09:29

## 2017-10-05 RX ADMIN — INSULIN LISPRO 1 UNITS: 100 INJECTION, SOLUTION INTRAVENOUS; SUBCUTANEOUS at 16:22

## 2017-10-05 RX ADMIN — OMEPRAZOLE 40 MG: 20 CAPSULE, DELAYED RELEASE ORAL at 07:59

## 2017-10-05 RX ADMIN — ENOXAPARIN SODIUM 40 MG: 100 INJECTION SUBCUTANEOUS at 07:59

## 2017-10-05 RX ADMIN — ALPRAZOLAM 0.5 MG: 0.5 TABLET ORAL at 14:40

## 2017-10-05 RX ADMIN — NYSTATIN 500000 UNITS: 100000 SUSPENSION ORAL at 22:15

## 2017-10-05 RX ADMIN — NYSTATIN 500000 UNITS: 100000 SUSPENSION ORAL at 04:15

## 2017-10-05 RX ADMIN — GABAPENTIN 600 MG: 300 CAPSULE ORAL at 22:14

## 2017-10-05 RX ADMIN — ALPRAZOLAM 0.5 MG: 0.5 TABLET ORAL at 08:01

## 2017-10-05 RX ADMIN — OXYCODONE HYDROCHLORIDE 30 MG: 5 TABLET ORAL at 16:17

## 2017-10-05 RX ADMIN — ASPIRIN 81 MG: 81 TABLET, COATED ORAL at 08:00

## 2017-10-05 RX ADMIN — LACTOBACILLUS ACIDOPHILUS / LACTOBACILLUS BULGARICUS 1 PACKET: 100 MILLION CFU STRENGTH GRANULES at 12:00

## 2017-10-05 RX ADMIN — GABAPENTIN 600 MG: 300 CAPSULE ORAL at 14:40

## 2017-10-05 RX ADMIN — NYSTATIN 500000 UNITS: 100000 SUSPENSION ORAL at 16:22

## 2017-10-05 RX ADMIN — MORPHINE SULFATE 15 MG: 15 TABLET ORAL at 22:15

## 2017-10-05 RX ADMIN — PRAVASTATIN SODIUM 20 MG: 10 TABLET ORAL at 07:59

## 2017-10-05 RX ADMIN — MAGNESIUM HYDROXIDE 30 ML: 400 SUSPENSION ORAL at 08:01

## 2017-10-05 RX ADMIN — FUROSEMIDE 40 MG: 40 TABLET ORAL at 06:05

## 2017-10-05 RX ADMIN — TACROLIMUS 1.5 MG: 1 CAPSULE ORAL at 22:23

## 2017-10-05 RX ADMIN — GABAPENTIN 600 MG: 300 CAPSULE ORAL at 08:00

## 2017-10-05 ASSESSMENT — PAIN SCALES - GENERAL
PAINLEVEL_OUTOF10: 8
PAINLEVEL_OUTOF10: 0
PAINLEVEL_OUTOF10: 8
PAINLEVEL_OUTOF10: 8

## 2017-10-05 ASSESSMENT — ENCOUNTER SYMPTOMS
SPUTUM PRODUCTION: 0
CONSTIPATION: 0
SHORTNESS OF BREATH: 0
FEVER: 0
DEPRESSION: 0
COUGH: 1
PALPITATIONS: 0
CHILLS: 0
HEADACHES: 0
SORE THROAT: 0
NAUSEA: 0
MYALGIAS: 0
ABDOMINAL PAIN: 0

## 2017-10-05 ASSESSMENT — LIFESTYLE VARIABLES
DO YOU DRINK ALCOHOL: NO
DO YOU DRINK ALCOHOL: NO

## 2017-10-05 NOTE — PROGRESS NOTES
Patient up ad yady to bathroom and ambulates without difficulty. O2 5L NC extension long enough for patient to ambulate to bathroom.  Patient will call for assistance if needed.

## 2017-10-05 NOTE — PROGRESS NOTES
Paged Dr Reese and she returned the call. Informed her that pt requested bowel med Relistor (med she was ordered yesterday) Dr Reese stated for the pt to bring in home med to use and start senna. Also pt states she can not take ordered gaudencio tx and request tx with no albuterol. Orders obtained for zopenex prn.

## 2017-10-05 NOTE — PROGRESS NOTES
Pt reports she's had diarrhea 3 times today. Reported nausea earlier, gave Zofran. Nausea resolved, spleen pain and diarrhea persisting. Encouraging PO fluids.

## 2017-10-05 NOTE — CARE PLAN
Problem: Venous Thromboembolism (VTW)/Deep Vein Thrombosis (DVT) Prevention:  Goal: Patient will participate in Venous Thrombosis (VTE)/Deep Vein Thrombosis (DVT)Prevention Measures  Outcome: PROGRESSING AS EXPECTED  SCDs on while patient in bed. Patient ambulates to bathroom independently. No s/s of DVT.

## 2017-10-05 NOTE — PROGRESS NOTES
Pt up in bed for breakfast. 02 at 5L. Safety and fall precautions in place, call light and belongings within reach.

## 2017-10-05 NOTE — CARE PLAN
Problem: Safety  Goal: Will remain free from injury  Outcome: PROGRESSING AS EXPECTED  Fall precautions maintained. Patient up ad yady in room. Patient's call light with in reach and belongings at bedside. Patient educated on importance of calling for stand by assistance if she feels weak. Patient verbalizes understanding and will call if she needs assistance.

## 2017-10-05 NOTE — PROGRESS NOTES
[facility] Hospitalist Progress Note    Date of Service: 10/5/2017    Chief Complaint  57 y.o. female admitted 10/3/2017 with past medical for liver transplant in 2011 here with healthcare acquired pneumonia.    Interval Problem Update  Patient complains of cough but denies any sputum production. Pt to try and ambulate with nursing staff today to see if she has dyspnea with exertion. Blood cx NGTD     Consultants/Specialty  None    Disposition  Likely home once medically improve        Review of Systems   Constitutional: Negative for chills and fever.   HENT: Negative for congestion and sore throat.    Respiratory: Positive for cough. Negative for sputum production and shortness of breath.    Cardiovascular: Negative for chest pain, palpitations and leg swelling.   Gastrointestinal: Negative for abdominal pain, constipation and nausea.   Musculoskeletal: Negative for myalgias.   Neurological: Negative for headaches.   Psychiatric/Behavioral: Negative for depression.      Physical Exam  Laboratory/Imaging   Hemodynamics  Temp (24hrs), Av.7 °C (98 °F), Min:36.3 °C (97.3 °F), Max:37.2 °C (99 °F)   Temperature: 36.7 °C (98 °F)  Pulse  Av.4  Min: 60  Max: 91    Blood Pressure: 118/65      Respiratory      Respiration: 16, Pulse Oximetry: 96 %     Work Of Breathing / Effort: Mild  RUL Breath Sounds: Diminished, RML Breath Sounds: Diminished, RLL Breath Sounds: Diminished, MANJINDER Breath Sounds: Diminished, LLL Breath Sounds: Diminished    Fluids    Intake/Output Summary (Last 24 hours) at 10/05/17 1040  Last data filed at 10/04/17 1230   Gross per 24 hour   Intake              240 ml   Output                0 ml   Net              240 ml       Nutrition  Orders Placed This Encounter   Procedures   • Diet Order     Standing Status:   Standing     Number of Occurrences:   1     Order Specific Question:   Diet:     Answer:   Hepatic [9]     Physical Exam   Constitutional: She is oriented to person, place, and time.  She appears well-developed and well-nourished. No distress.   HENT:   Head: Normocephalic and atraumatic.   Mouth/Throat: Oropharynx is clear and moist.   Eyes: Conjunctivae and EOM are normal. Right eye exhibits no discharge. Left eye exhibits no discharge.   Neck: Neck supple.   Cardiovascular: Normal rate and regular rhythm.    No murmur heard.  Pulmonary/Chest: Effort normal. No respiratory distress. She has no wheezes. She has no rales.   Decreased breath sounds bilateral bases   Abdominal: Soft. She exhibits no distension.   Musculoskeletal: She exhibits no edema.   Lymphadenopathy:     She has no cervical adenopathy.   Neurological: She is alert and oriented to person, place, and time. No cranial nerve deficit.   Skin: No rash noted. She is not diaphoretic. No erythema.       Recent Labs      10/03/17   1243  10/04/17   0137  10/05/17   0317   WBC  6.8  4.2*  3.5*   RBC  4.18*  4.00*  4.11*   HEMOGLOBIN  12.5  11.8*  11.8*   HEMATOCRIT  38.0  35.8*  37.1   MCV  90.9  89.5  90.3   MCH  29.9  29.5  28.7   MCHC  32.9*  33.0*  31.8*   RDW  45.9  45.0  46.1   PLATELETCT  66*  54*  66*   MPV  9.0  9.5  9.3     Recent Labs      10/03/17   1243  10/04/17   0137  10/05/17   0317   SODIUM  137  141  141   POTASSIUM  4.5  3.3*  4.4   CHLORIDE  99  100  102   CO2  33  35*  34*   GLUCOSE  192*  186*  95   BUN  17  17  20   CREATININE  1.08  1.12  1.14   CALCIUM  9.6  8.7  9.2         Recent Labs      10/03/17   1243   BNPBTYPENAT  56              Assessment/Plan     * HCAP (healthcare-associated pneumonia)- (present on admission)   Assessment & Plan    Pt on ceftriaxone and doxycycline  Monitor culture results        Acute on chronic respiratory failure with hypoxia (CMS-HCC)- (present on admission)   Assessment & Plan    On chronic O2 5L, at baseline        Pure hypercholesterolemia- (present on admission)   Assessment & Plan    Continue statin        Chronic obstructive pulmonary disease with acute exacerbation  (CMS-HCC)- (present on admission)   Assessment & Plan    Oxygen per protocol. PRN and scheduled nebulizers. RT protocol  No acute exacerbation        Sarcoidosis (CMS-HCC)- (present on admission)   Assessment & Plan    History and diagnosis of. currently stable        Status post liver transplant Dr. Canada ([facility])- (present on admission)   Assessment & Plan    S/p liver transplant at [facility] in [place] 2011  Continue on Cellcept and prednisone        Steroid-induced hyperglycemia   Assessment & Plan    On chronic prednisone, s/p liver transplant  A1c 5.2 (Jan 2017), on ISS, repeat A1c 6.6         History of Clostridium difficile infection- (present on admission)   Assessment & Plan    Despite immunosuppression, was on probiotics. Continue probiotics.        IDDM (insulin dependent diabetes mellitus) (CMS-HCC)- (present on admission)   Assessment & Plan    Used to be on insulin, but now diet controlled secondary to weight loss (100 lbs per pt)  A1c 6.6  On insulin sliding scale        Chronic use of opiate drugs therapeutic purposes- (present on admission)   Assessment & Plan    Continue her home pain medications; PRN for cough related discomfort        Hypokalemia- (present on admission)   Assessment & Plan    Resolved        GERD (gastroesophageal reflux disease)- (present on admission)   Assessment & Plan    Cont pt on PPI        Immunocompromised state ([facility])- (present on admission)   Assessment & Plan    Transplant patient, on immusuppressants and steroids. Proper hygenine and isolation procedures        Pulmonary hypertension- (present on admission)   Assessment & Plan    Cont patient on tadalafil  Echo Jan 2017 showed EF 65%, RVSP 35 mmHg.            Reviewed items::  Labs reviewed and Medications reviewed  Holbrook catheter::  No Holbrook  DVT prophylaxis pharmacological::  Enoxaparin (Lovenox)  Ulcer Prophylaxis::  Yes  Antibiotics:  Treating active infection/contamination beyond 24 hours perioperative  coverage

## 2017-10-05 NOTE — DISCHARGE PLANNING
Medical Social Work    Presented pt with and had pt sign IMM letter. Documented in Flowsheet portion of the chart. Gave yellow carbon copy to pt and placed original pink copy in pt's chart.

## 2017-10-05 NOTE — DISCHARGE PLANNING
Medical Social Work    Pt is a 58 y/o female who was admitted for healthcare associated pneumonia. Pt's payer source is through [employer] [facility] [employer]. Pt uses home oxygen provided by [facility]. Anticipated D/C plan is home with no needs from case managment when medically cleared.

## 2017-10-05 NOTE — CARE PLAN
Problem: Communication  Goal: The ability to communicate needs accurately and effectively will improve  Outcome: PROGRESSING AS EXPECTED  Pt uses call light appropriately to verbalize needs.     Problem: Bowel/Gastric:  Goal: Normal bowel function is maintained or improved  Outcome: PROGRESSING AS EXPECTED  Pt has not had bowel movement in 2 days; provided milk of magnesia, stool softener and extra coffee.

## 2017-10-06 VITALS
TEMPERATURE: 97.6 F | DIASTOLIC BLOOD PRESSURE: 79 MMHG | BODY MASS INDEX: 25.99 KG/M2 | SYSTOLIC BLOOD PRESSURE: 132 MMHG | RESPIRATION RATE: 17 BRPM | HEART RATE: 64 BPM | WEIGHT: 171.52 LBS | OXYGEN SATURATION: 97 % | HEIGHT: 68 IN

## 2017-10-06 PROBLEM — E87.6 HYPOKALEMIA: Status: RESOLVED | Noted: 2017-03-23 | Resolved: 2017-10-06

## 2017-10-06 PROBLEM — J18.9 HCAP (HEALTHCARE-ASSOCIATED PNEUMONIA): Status: RESOLVED | Noted: 2017-01-16 | Resolved: 2017-10-06

## 2017-10-06 LAB
C DIFF DNA SPEC QL NAA+PROBE: NEGATIVE
C DIFF TOX GENS STL QL NAA+PROBE: NEGATIVE
ERYTHROCYTE [DISTWIDTH] IN BLOOD BY AUTOMATED COUNT: 44.9 FL (ref 35.9–50)
GLUCOSE BLD-MCNC: 111 MG/DL (ref 65–99)
GLUCOSE BLD-MCNC: 114 MG/DL (ref 65–99)
GLUCOSE BLD-MCNC: 174 MG/DL (ref 65–99)
HCT VFR BLD AUTO: 35.4 % (ref 37–47)
HGB BLD-MCNC: 11.7 G/DL (ref 12–16)
MCH RBC QN AUTO: 29.4 PG (ref 27–33)
MCHC RBC AUTO-ENTMCNC: 33.1 G/DL (ref 33.6–35)
MCV RBC AUTO: 88.9 FL (ref 81.4–97.8)
PLATELET # BLD AUTO: 71 K/UL (ref 164–446)
PMV BLD AUTO: 9.3 FL (ref 9–12.9)
RBC # BLD AUTO: 3.98 M/UL (ref 4.2–5.4)
TACROLIMUS BLD-MCNC: 6.1 NG/ML
WBC # BLD AUTO: 3.6 K/UL (ref 4.8–10.8)

## 2017-10-06 PROCEDURE — 700111 HCHG RX REV CODE 636 W/ 250 OVERRIDE (IP): Performed by: INTERNAL MEDICINE

## 2017-10-06 PROCEDURE — 82962 GLUCOSE BLOOD TEST: CPT | Mod: 91

## 2017-10-06 PROCEDURE — 87493 C DIFF AMPLIFIED PROBE: CPT

## 2017-10-06 PROCEDURE — 99239 HOSP IP/OBS DSCHRG MGMT >30: CPT | Performed by: INTERNAL MEDICINE

## 2017-10-06 PROCEDURE — 36415 COLL VENOUS BLD VENIPUNCTURE: CPT

## 2017-10-06 PROCEDURE — 700102 HCHG RX REV CODE 250 W/ 637 OVERRIDE(OP): Performed by: INTERNAL MEDICINE

## 2017-10-06 PROCEDURE — 85027 COMPLETE CBC AUTOMATED: CPT

## 2017-10-06 PROCEDURE — A9270 NON-COVERED ITEM OR SERVICE: HCPCS | Performed by: INTERNAL MEDICINE

## 2017-10-06 RX ORDER — DOXYCYCLINE 100 MG/1
100 TABLET ORAL EVERY 12 HOURS
Qty: 8 TAB | Refills: 0 | Status: SHIPPED | OUTPATIENT
Start: 2017-10-06 | End: 2017-10-10

## 2017-10-06 RX ORDER — AMOXICILLIN AND CLAVULANATE POTASSIUM 875; 125 MG/1; MG/1
1 TABLET, FILM COATED ORAL 2 TIMES DAILY
Qty: 8 TAB | Refills: 0 | Status: SHIPPED | OUTPATIENT
Start: 2017-10-06 | End: 2017-10-20 | Stop reason: SDUPTHER

## 2017-10-06 RX ADMIN — OMEPRAZOLE 40 MG: 20 CAPSULE, DELAYED RELEASE ORAL at 09:39

## 2017-10-06 RX ADMIN — MYCOPHENOLATE MOFETIL 500 MG: 250 CAPSULE ORAL at 09:39

## 2017-10-06 RX ADMIN — TIOTROPIUM BROMIDE 1 CAPSULE: 18 CAPSULE ORAL; RESPIRATORY (INHALATION) at 09:40

## 2017-10-06 RX ADMIN — OXYCODONE HYDROCHLORIDE 30 MG: 5 TABLET ORAL at 06:36

## 2017-10-06 RX ADMIN — FUROSEMIDE 40 MG: 40 TABLET ORAL at 06:34

## 2017-10-06 RX ADMIN — ALPRAZOLAM 0.5 MG: 0.5 TABLET ORAL at 09:40

## 2017-10-06 RX ADMIN — ASPIRIN 81 MG: 81 TABLET, COATED ORAL at 09:39

## 2017-10-06 RX ADMIN — AMBRISENTAN 10 MG: 10 TABLET, FILM COATED ORAL at 09:41

## 2017-10-06 RX ADMIN — GABAPENTIN 600 MG: 300 CAPSULE ORAL at 09:39

## 2017-10-06 RX ADMIN — TACROLIMUS 1.5 MG: 1 CAPSULE ORAL at 09:40

## 2017-10-06 RX ADMIN — LEVOTHYROXINE SODIUM 137 MCG: 137 TABLET ORAL at 06:35

## 2017-10-06 RX ADMIN — DOXYCYCLINE 100 MG: 100 TABLET ORAL at 09:40

## 2017-10-06 RX ADMIN — PRAVASTATIN SODIUM 20 MG: 10 TABLET ORAL at 09:40

## 2017-10-06 RX ADMIN — FLUTICASONE PROPIONATE 50 MCG: 50 SPRAY, METERED NASAL at 09:40

## 2017-10-06 RX ADMIN — ENOXAPARIN SODIUM 40 MG: 100 INJECTION SUBCUTANEOUS at 09:38

## 2017-10-06 RX ADMIN — Medication 220 MG: at 09:40

## 2017-10-06 RX ADMIN — LACTOBACILLUS ACIDOPHILUS / LACTOBACILLUS BULGARICUS 1 PACKET: 100 MILLION CFU STRENGTH GRANULES at 06:35

## 2017-10-06 RX ADMIN — OXYCODONE HYDROCHLORIDE AND ACETAMINOPHEN 500 MG: 500 TABLET ORAL at 09:39

## 2017-10-06 RX ADMIN — PREDNISONE 7 MG: 1 TABLET ORAL at 09:00

## 2017-10-06 RX ADMIN — HYDROMORPHONE HYDROCHLORIDE 0.25 MG: 1 INJECTION, SOLUTION INTRAMUSCULAR; INTRAVENOUS; SUBCUTANEOUS at 00:19

## 2017-10-06 ASSESSMENT — PAIN SCALES - GENERAL
PAINLEVEL_OUTOF10: 5
PAINLEVEL_OUTOF10: 2

## 2017-10-06 NOTE — CARE PLAN
"Problem: Pain Management  Goal: Pain level will decrease to patient's comfort goal  Patient medicated for \"spleen pain\".  Patient able to rest comfortably.      "

## 2017-10-06 NOTE — DISCHARGE INSTRUCTIONS
Discharge Instructions    Discharged to home by car with relative. Discharged via wheelchair, hospital escort: Yes.  Special equipment needed: Not Applicable    Be sure to schedule a follow-up appointment with your primary care doctor or any specialists as instructed.     Discharge Plan:   Diet Plan: Discussed  Activity Level: Discussed  Confirmed Follow up Appointment: No (Comments)  Confirmed Symptoms Management: Discussed  Medication Reconciliation Updated: Yes  Influenza Vaccine Indication: Not indicated: Previously immunized this influenza season and > 8 years of age    I understand that a diet low in cholesterol, fat, and sodium is recommended for good health. Unless I have been given specific instructions below for another diet, I accept this instruction as my diet prescription.   Other diet: Resume home diet    Special Instructions: none    · Is patient discharged on Warfarin / Coumadin?   No     · Is patient Post Blood Transfusion?  No    Depression / Suicide Risk    As you are discharged from this [facility] facility, it is important to learn how to keep safe from harming yourself.    Recognize the warning signs:  · Abrupt changes in personality, positive or negative- including increase in energy   · Giving away possessions  · Change in eating patterns- significant weight changes-  positive or negative  · Change in sleeping patterns- unable to sleep or sleeping all the time   · Unwillingness or inability to communicate  · Depression  · Unusual sadness, discouragement and loneliness  · Talk of wanting to die  · Neglect of personal appearance   · Rebelliousness- reckless behavior  · Withdrawal from people/activities they love  · Confusion- inability to concentrate     If you or a loved one observes any of these behaviors or has concerns about self-harm, here's what you can do:  · Talk about it- your feelings and reasons for harming yourself  · Remove any means that you might use to hurt yourself  (examples: pills, rope, extension cords, firearm)  · Get professional help from the community (Mental Health, Substance Abuse, psychological counseling)  · Do not be alone:Call your Safe Contact- someone whom you trust who will be there for you.  · Call your local CRISIS HOTLINE 543-3939 or 545-683-8857  · Call your local Children's Mobile Crisis Response Team [place] (824) 651-3172 or www.All4Staff  · Call the toll free National Suicide Prevention Hotlines   · National Suicide Prevention Lifeline 306-150-PGOS (2759)  · National Hope Line Network 800-SUICIDE (094-5340)

## 2017-10-06 NOTE — DISCHARGE SUMMARY
CHIEF COMPLAINT ON ADMISSION  Chief Complaint   Patient presents with   • LUQ Pain     x 2 days       CODE STATUS  Full Code    HPI & HOSPITAL COURSE  Please see H&P dictated by Dr. Zelaya. This is a 57 y.o. female with past medical history of liver transplant in 2011 here with acute on chronic respiratory failure secondary to healthcare acquired pneumonia. Patient was admitted and started on empiric antibiotics. Patient had blood cultures which are negative to date. She is to follow-up with her primary care provider for final blood culture results. Patient's UA was negative. Patient is discharged home with doxycycline and augmentin for total of 4 days to complete her antibiotic course. Patient's acute on chronic respiratory failure improved during her hospital stay and she was at baseline 5 L supplemental oxygen at time of discharge. Patient was noted to have diarrhea during her hospital stay and C. difficile PCR was checked which was negative. Her diarrhea resolved prior to her discharge home.    Therefore, she is discharged in good and stable condition with close outpatient follow-up.        DISCHARGE PROBLEM LIST  Principal Problem (Resolved):    HCAP (healthcare-associated pneumonia) POA: Yes  Active Problems:    Pure hypercholesterolemia POA: Yes    Status post liver transplant Dr. Canada ([facility]) POA: Yes      Overview: -December 2011: Status post liver transplant for end stage liver disease       [place] [facility] [place].          Sarcoidosis (CMS-HCC) POA: Yes    Chronic obstructive pulmonary disease with acute exacerbation (CMS-HCC) POA: Yes    Pulmonary hypertension POA: Yes      Overview: Initial evaluation at [facility] pre liver transplant, PFO closed w/o       complication, 1/25/2011, then worse PAH and R HF post transplant,       [place] [facility] [place] and drug trial, responding       to tadalafil and ambrisentan dramatically. Followed there with serial R        heart cath.      3/25/2014: Most recent R heart cath done Dr. Brenda Flores with mild       pulmonary hypertension and relatively high ouput      November 2014: Echocardiogram with normal LV size, LVEF 60-65%. Mild MR,       mild AI, mild TR, RVSP 29-34mmHg.      February 2015: Echocardiogram with mild concentric LVH, LVEF 65-70%. Mild       MR, mild AI, trace TR, RVSP 16mmHg.    Immunocompromised state (CMS-HCC) POA: Yes    GERD (gastroesophageal reflux disease) POA: Yes    Chronic use of opiate drugs therapeutic purposes POA: Yes    IDDM (insulin dependent diabetes mellitus) (CMS-HCC) POA: Yes    History of Clostridium difficile infection POA: Yes    Steroid-induced hyperglycemia POA: Unknown  Resolved Problems:    Acute on chronic respiratory failure with hypoxia (CMS-HCC) POA: Yes    Hypokalemia POA: Yes      FOLLOW UP  Future Appointments  Date Time Provider Department Center   10/11/2017 8:00 AM [facility] None   10/20/2017 10:00 AM [facility]   10/20/2017 2:20 PM [facility]   11/8/2017 8:00 AM [facility] None   12/6/2017 8:00 AM [facility] None         MEDICATIONS ON DISCHARGE   Roxana [place]   Home Medication Instructions MORELIA:[id number]    Printed on:10/06/17 1056   Medication Information                      alprazolam (XANAX) 0.5 MG Tab  Take 1 Tab by mouth 3 times a day.             ambrisentan (LETAIRIS) 10 MG tablet  Take 1 Tab by mouth every day.             amoxicillin-clavulanate (AUGMENTIN) 875-125 MG Tab  Take 1 Tab by mouth 2 times a day for 4 days.             ascorbic acid (ASCORBIC ACID) 500 MG Tab  Take 500 mg by mouth every day.             aspirin EC (ECOTRIN) 81 MG Tablet Delayed Response  Take 1 Tab by mouth every morning.             CITRACAL MAXIMUM 315-250 MG-UNIT Tab  TAKE 2 TABS BY MOUTH 2X/ DAY WITH FOOD BEFORE AND AFTER DINNER FOR LIFE-CRUSH 1ST 3 MONTH, SPACE MVI              cyclobenzaprine (FLEXERIL) 10 MG Tab  Take 10 mg by mouth 3 times a day as needed for Muscle Spasms.             docusate sodium (COLACE) 100 MG Cap  Take 100 mg by mouth every day.             doxycycline monohydrate (ADOXA) 100 MG tablet  Take 1 Tab by mouth every 12 hours for 4 days.             ferrous sulfate (FEOSOL) 220 (44 FE) MG/5ML Elixir  Take 5 mL by mouth every day.             fluticasone (FLONASE) 50 MCG/ACT nasal spray  SPRAY 1 SPRAY IN EACH NOSTRIL TWICE A DAY             furosemide (LASIX) 40 MG Tab  TAKE 1 TAB BY MOUTH EVERY DAY.             gabapentin (NEURONTIN) 600 MG tablet  Take 1 Tab by mouth 3 times a day.             levothyroxine (SYNTHROID) 137 MCG Tab  Take 137 mcg by mouth Every morning on an empty stomach.             Linaclotide 290 MCG Cap  Take 290 mg by mouth every day.             morphine (MS IR) 15 MG tablet  Take 1 Tab by mouth every four hours as needed for Severe Pain.             mycophenolate (CELLCEPT) 250 MG Cap  Take 500 mg by mouth 2 times a day.             omeprazole (PRILOSEC) 40 MG delayed-release capsule  Take 1 Cap by mouth every day.             ondansetron (ZOFRAN ODT) 4 MG TABLET DISPERSIBLE  Take 1 Tab by mouth every 8 hours as needed for Nausea/Vomiting. Nausea and vomiting             oxycodone immediate release (ROXICODONE) 10 MG immediate release tablet  Take 1-3 Tabs by mouth every 6 hours as needed for Moderate Pain.             pravastatin (PRAVACHOL) 20 MG Tab  TAKE 1 TAB BY MOUTH EVERY DAY.             predniSONE (DELTASONE) 5 MG Tab  Take 7.5 mg by mouth every morning.             Probiotic Product (PROBIOTIC DAILY PO)  Take 1 Cap by mouth every day.             sennosides (SENOKOT) 8.6 MG Tab  Take 17.2 mg by mouth 2 times a day.             sertraline (ZOLOFT) 100 MG Tab  Take 100 mg by mouth every bedtime.             tacrolimus (PROGRAF) 0.5 MG Cap  Take 0.5 mg by mouth 2 times a day. Take with 1 mg = 1.5 mg BID             Tadalafil, PAH,  (ADCIRCA) 20 MG Tab  Take 40 mg by mouth every bedtime. Indications: Pulmonary Arterial Hypertension             tiotropium (SPIRIVA) 18 MCG Cap  Inhale 1 Cap by mouth every day.             trazodone (DESYREL) 50 MG Tab  TAKE 1-2 TABS BY MOUTH EVERY BEDTIME.                 DIET  Orders Placed This Encounter   Procedures   • Diet Order     Standing Status:   Standing     Number of Occurrences:   1     Order Specific Question:   Diet:     Answer:   Hepatic [9]       ACTIVITY  As tolerated.        CONSULTATIONS  None    PROCEDURES  None    LABORATORY  Lab Results   Component Value Date/Time    SODIUM 141 10/05/2017 03:17 AM    POTASSIUM 4.4 10/05/2017 03:17 AM    CHLORIDE 102 10/05/2017 03:17 AM    CO2 34 (H) 10/05/2017 03:17 AM    GLUCOSE 95 10/05/2017 03:17 AM    BUN 20 10/05/2017 03:17 AM    CREATININE 1.14 10/05/2017 03:17 AM        Lab Results   Component Value Date/Time    WBC 3.6 (L) 10/06/2017 02:23 AM    HEMOGLOBIN 11.7 (L) 10/06/2017 02:23 AM    HEMATOCRIT 35.4 (L) 10/06/2017 02:23 AM    PLATELETCT 71 (L) 10/06/2017 02:23 AM      This dictation was created using voice recognition software. The accuracy of the dictation is limited to the abilities of the software. I expect there may be some errors of grammar and possibly content.    Total time of the discharge process exceeds 40 minutes

## 2017-10-06 NOTE — CARE PLAN
Problem: Safety  Goal: Will remain free from falls  Patient is steady on her feet.  Patient has no mobility issues.

## 2017-10-06 NOTE — DISCHARGE SUMMARY
Pt discharged home today with . Discharge instruction reviewed with pt by Rn. No questions voiced by pt or family. No acute changes to assessment, VSS. Scripts called in to pt preferred pharmacy for . To main entrance via wheelchair with staff present.

## 2017-10-09 ENCOUNTER — PATIENT OUTREACH (OUTPATIENT)
Dept: HEALTH INFORMATION MANAGEMENT | Facility: OTHER | Age: 57
End: 2017-10-09

## 2017-10-11 ENCOUNTER — HOSPITAL ENCOUNTER (OUTPATIENT)
Dept: LAB | Facility: MEDICAL CENTER | Age: 57
End: 2017-10-11
Attending: INTERNAL MEDICINE
Payer: MEDICARE

## 2017-10-11 LAB
ALBUMIN SERPL BCP-MCNC: 3.9 G/DL (ref 3.2–4.9)
ALBUMIN/GLOB SERPL: 1.9 G/DL
ALP SERPL-CCNC: 27 U/L (ref 30–99)
ALT SERPL-CCNC: 13 U/L (ref 2–50)
ANION GAP SERPL CALC-SCNC: 4 MMOL/L (ref 0–11.9)
AST SERPL-CCNC: 16 U/L (ref 12–45)
BASOPHILS # BLD AUTO: 0.6 % (ref 0–1.8)
BASOPHILS # BLD: 0.02 K/UL (ref 0–0.12)
BILIRUB SERPL-MCNC: 0.3 MG/DL (ref 0.1–1.5)
BUN SERPL-MCNC: 18 MG/DL (ref 8–22)
CALCIUM SERPL-MCNC: 9.4 MG/DL (ref 8.5–10.5)
CHLORIDE SERPL-SCNC: 104 MMOL/L (ref 96–112)
CO2 SERPL-SCNC: 35 MMOL/L (ref 20–33)
CREAT SERPL-MCNC: 1.04 MG/DL (ref 0.5–1.4)
EOSINOPHIL # BLD AUTO: 0.16 K/UL (ref 0–0.51)
EOSINOPHIL NFR BLD: 4.9 % (ref 0–6.9)
ERYTHROCYTE [DISTWIDTH] IN BLOOD BY AUTOMATED COUNT: 46.6 FL (ref 35.9–50)
GFR SERPL CREATININE-BSD FRML MDRD: 54 ML/MIN/1.73 M 2
GGT SERPL-CCNC: 10 U/L (ref 7–34)
GLOBULIN SER CALC-MCNC: 2.1 G/DL (ref 1.9–3.5)
GLUCOSE SERPL-MCNC: 99 MG/DL (ref 65–99)
HCT VFR BLD AUTO: 38.9 % (ref 37–47)
HGB BLD-MCNC: 12.3 G/DL (ref 12–16)
IMM GRANULOCYTES # BLD AUTO: 0.01 K/UL (ref 0–0.11)
IMM GRANULOCYTES NFR BLD AUTO: 0.3 % (ref 0–0.9)
LYMPHOCYTES # BLD AUTO: 1.18 K/UL (ref 1–4.8)
LYMPHOCYTES NFR BLD: 36.1 % (ref 22–41)
MAGNESIUM SERPL-MCNC: 2 MG/DL (ref 1.5–2.5)
MCH RBC QN AUTO: 28.7 PG (ref 27–33)
MCHC RBC AUTO-ENTMCNC: 31.6 G/DL (ref 33.6–35)
MCV RBC AUTO: 90.9 FL (ref 81.4–97.8)
MONOCYTES # BLD AUTO: 0.25 K/UL (ref 0–0.85)
MONOCYTES NFR BLD AUTO: 7.6 % (ref 0–13.4)
NEUTROPHILS # BLD AUTO: 1.65 K/UL (ref 2–7.15)
NEUTROPHILS NFR BLD: 50.5 % (ref 44–72)
NRBC # BLD AUTO: 0 K/UL
NRBC BLD AUTO-RTO: 0 /100 WBC
PLATELET # BLD AUTO: 80 K/UL (ref 164–446)
PMV BLD AUTO: 9.5 FL (ref 9–12.9)
POTASSIUM SERPL-SCNC: 4.2 MMOL/L (ref 3.6–5.5)
PROT SERPL-MCNC: 6 G/DL (ref 6–8.2)
RBC # BLD AUTO: 4.28 M/UL (ref 4.2–5.4)
SODIUM SERPL-SCNC: 143 MMOL/L (ref 135–145)
WBC # BLD AUTO: 3.3 K/UL (ref 4.8–10.8)

## 2017-10-11 PROCEDURE — 36415 COLL VENOUS BLD VENIPUNCTURE: CPT

## 2017-10-11 PROCEDURE — 80053 COMPREHEN METABOLIC PANEL: CPT

## 2017-10-11 PROCEDURE — 83735 ASSAY OF MAGNESIUM: CPT

## 2017-10-11 PROCEDURE — 86334 IMMUNOFIX E-PHORESIS SERUM: CPT

## 2017-10-11 PROCEDURE — 82784 ASSAY IGA/IGD/IGG/IGM EACH: CPT

## 2017-10-11 PROCEDURE — 83883 ASSAY NEPHELOMETRY NOT SPEC: CPT

## 2017-10-11 PROCEDURE — 84156 ASSAY OF PROTEIN URINE: CPT

## 2017-10-11 PROCEDURE — 82977 ASSAY OF GGT: CPT

## 2017-10-11 PROCEDURE — 85025 COMPLETE CBC W/AUTO DIFF WBC: CPT

## 2017-10-11 PROCEDURE — 84165 PROTEIN E-PHORESIS SERUM: CPT

## 2017-10-11 PROCEDURE — 80197 ASSAY OF TACROLIMUS: CPT

## 2017-10-11 PROCEDURE — 84160 ASSAY OF PROTEIN ANY SOURCE: CPT

## 2017-10-12 LAB
IGA SERPL-MCNC: 82 MG/DL (ref 68–408)
IGG SERPL-MCNC: 606 MG/DL (ref 768–1632)
IGM SERPL-MCNC: 42 MG/DL (ref 35–263)

## 2017-10-13 LAB
ALBUMIN 24H UR QL ELPH: DETECTED
ALPHA1 GLOB 24H UR QL ELPH: DETECTED
ALPHA2 GLOB 24H UR QL ELPH: DETECTED
B-GLOBULIN UR QL ELPH: DETECTED
COLLECT DURATION TIME SPEC: ABNORMAL H
GAMMA GLOB UR QL ELPH: DETECTED
INTERPRETATION UR IFE-IMP: ABNORMAL
KAPPA LC FREE UR-MCNC: 0.89 MG/DL (ref 0.14–2.42)
KAPPA LC FREE/LAMBDA FREE UR: 29.67 RATIO (ref 2.04–10.37)
LAMBDA LC FREE UR-MCNC: 0.03 MG/DL (ref 0.02–0.67)
PROT 24H UR-MRATE: ABNORMAL MG/D (ref 10–140)
TACROLIMUS BLD-MCNC: 2.6 NG/ML
TOTAL VOLUME 1105: ABNORMAL ML

## 2017-10-14 LAB
ALBUMIN SERPL-MCNC: 4.05 G/DL (ref 3.75–5.01)
ALPHA1 GLOB SERPL ELPH-MCNC: 0.28 G/DL (ref 0.19–0.46)
ALPHA2 GLOB SERPL ELPH-MCNC: 0.56 G/DL (ref 0.48–1.05)
B-GLOBULIN SERPL ELPH-MCNC: 0.55 G/DL (ref 0.48–1.1)
GAMMA GLOB SERPL ELPH-MCNC: 0.56 G/DL (ref 0.62–1.51)
INTERPRETATION SERPL IFE-IMP: ABNORMAL
MONOCLON BAND OBS SERPL: ABNORMAL
PATHOLOGY STUDY: ABNORMAL
PROT SERPL-MCNC: 6 G/DL (ref 6–8.3)

## 2017-10-20 ENCOUNTER — OFFICE VISIT (OUTPATIENT)
Dept: MEDICAL GROUP | Facility: PHYSICIAN GROUP | Age: 57
End: 2017-10-20
Payer: MEDICARE

## 2017-10-20 ENCOUNTER — TELEPHONE (OUTPATIENT)
Dept: MEDICAL GROUP | Facility: PHYSICIAN GROUP | Age: 57
End: 2017-10-20

## 2017-10-20 VITALS
OXYGEN SATURATION: 96 % | SYSTOLIC BLOOD PRESSURE: 122 MMHG | RESPIRATION RATE: 16 BRPM | WEIGHT: 178 LBS | TEMPERATURE: 99.8 F | HEART RATE: 72 BPM | DIASTOLIC BLOOD PRESSURE: 74 MMHG | HEIGHT: 68 IN | BODY MASS INDEX: 26.98 KG/M2

## 2017-10-20 DIAGNOSIS — J96.11 CHRONIC RESPIRATORY FAILURE WITH HYPOXIA (HCC): ICD-10-CM

## 2017-10-20 DIAGNOSIS — I34.0 MILD MITRAL REGURGITATION: ICD-10-CM

## 2017-10-20 DIAGNOSIS — I25.2 H/O NON-ST ELEVATION MYOCARDIAL INFARCTION (NSTEMI): ICD-10-CM

## 2017-10-20 DIAGNOSIS — G89.29 CHRONIC BILATERAL THORACIC BACK PAIN: ICD-10-CM

## 2017-10-20 DIAGNOSIS — F33.41 RECURRENT MAJOR DEPRESSIVE DISORDER, IN PARTIAL REMISSION (HCC): ICD-10-CM

## 2017-10-20 DIAGNOSIS — M54.6 CHRONIC BILATERAL THORACIC BACK PAIN: ICD-10-CM

## 2017-10-20 DIAGNOSIS — K59.03 DRUG-INDUCED CONSTIPATION: ICD-10-CM

## 2017-10-20 DIAGNOSIS — I35.1 MILD AORTIC REGURGITATION: ICD-10-CM

## 2017-10-20 DIAGNOSIS — E78.00 PURE HYPERCHOLESTEROLEMIA: ICD-10-CM

## 2017-10-20 DIAGNOSIS — Z98.84 HX OF GASTRIC BYPASS: ICD-10-CM

## 2017-10-20 DIAGNOSIS — Z79.891 CHRONIC USE OF OPIATE DRUGS THERAPEUTIC PURPOSES: ICD-10-CM

## 2017-10-20 DIAGNOSIS — Z01.818 PREOPERATIVE CLEARANCE: ICD-10-CM

## 2017-10-20 DIAGNOSIS — Z87.19 HISTORY OF PANCREATITIS: ICD-10-CM

## 2017-10-20 DIAGNOSIS — Z94.4 STATUS POST LIVER TRANSPLANT (HCC): ICD-10-CM

## 2017-10-20 DIAGNOSIS — D69.6 THROMBOCYTOPENIA (HCC): ICD-10-CM

## 2017-10-20 DIAGNOSIS — F41.9 ANXIETY: ICD-10-CM

## 2017-10-20 DIAGNOSIS — Z79.899 CHRONIC PRESCRIPTION BENZODIAZEPINE USE: ICD-10-CM

## 2017-10-20 DIAGNOSIS — N18.30 CKD (CHRONIC KIDNEY DISEASE) STAGE 3, GFR 30-59 ML/MIN (HCC): ICD-10-CM

## 2017-10-20 DIAGNOSIS — K76.81 HEPATOPULMONARY SYNDROME (HCC): ICD-10-CM

## 2017-10-20 DIAGNOSIS — E03.9 HYPOTHYROIDISM, ADULT: ICD-10-CM

## 2017-10-20 DIAGNOSIS — R16.1 SPLENOMEGALY: ICD-10-CM

## 2017-10-20 DIAGNOSIS — L30.4 INTERTRIGINOUS DERMATITIS ASSOCIATED WITH MOISTURE: ICD-10-CM

## 2017-10-20 DIAGNOSIS — D84.9 IMMUNOCOMPROMISED STATE (HCC): ICD-10-CM

## 2017-10-20 DIAGNOSIS — I27.20 PULMONARY HYPERTENSION (HCC): ICD-10-CM

## 2017-10-20 DIAGNOSIS — D47.2 MGUS (MONOCLONAL GAMMOPATHY OF UNKNOWN SIGNIFICANCE): ICD-10-CM

## 2017-10-20 DIAGNOSIS — J44.9 CHRONIC OBSTRUCTIVE PULMONARY DISEASE, UNSPECIFIED COPD TYPE (HCC): ICD-10-CM

## 2017-10-20 DIAGNOSIS — K21.9 GASTROESOPHAGEAL REFLUX DISEASE, ESOPHAGITIS PRESENCE NOT SPECIFIED: ICD-10-CM

## 2017-10-20 DIAGNOSIS — D86.9 SARCOIDOSIS: ICD-10-CM

## 2017-10-20 PROCEDURE — 99214 OFFICE O/P EST MOD 30 MIN: CPT | Performed by: FAMILY MEDICINE

## 2017-10-20 RX ORDER — PREDNISONE 1 MG/1
7 TABLET ORAL DAILY
Qty: 7 TAB | Refills: 0 | Status: ON HOLD
Start: 2017-10-20 | End: 2018-01-20

## 2017-10-20 RX ORDER — PREDNISONE 5 MG/1
7 TABLET ORAL EVERY MORNING
Qty: 30 TAB | COMMUNITY
Start: 2017-10-20 | End: 2017-10-20

## 2017-10-20 RX ORDER — PREDNISONE 5 MG/1
7 TABLET ORAL DAILY
COMMUNITY
Start: 2017-10-20 | End: 2017-10-20 | Stop reason: SDUPTHER

## 2017-10-20 RX ORDER — TACROLIMUS 0.5 MG/1
3 CAPSULE ORAL DAILY
Qty: 6 CAP | Refills: 2
Start: 2017-10-20 | End: 2017-10-27

## 2017-10-20 RX ORDER — OXYCODONE HYDROCHLORIDE 10 MG/1
10-30 TABLET ORAL EVERY 6 HOURS PRN
Qty: 90 TAB | Refills: 0 | Status: SHIPPED | OUTPATIENT
Start: 2017-10-20 | End: 2017-10-20 | Stop reason: SDUPTHER

## 2017-10-20 RX ORDER — POLYETHYLENE GLYCOL 3350 17 G/17G
1 POWDER, FOR SOLUTION ORAL DAILY
Status: ON HOLD | COMMUNITY
End: 2018-01-20

## 2017-10-20 RX ORDER — OXYCODONE AND ACETAMINOPHEN 10; 325 MG/1; MG/1
1-2 TABLET ORAL EVERY 6 HOURS PRN
Qty: 90 TAB | Refills: 0 | Status: SHIPPED | OUTPATIENT
Start: 2017-10-20 | End: 2017-10-20 | Stop reason: CLARIF

## 2017-10-20 RX ORDER — OXYCODONE HYDROCHLORIDE 10 MG/1
10-30 TABLET ORAL EVERY 6 HOURS PRN
Qty: 90 TAB | Refills: 0 | Status: ON HOLD | OUTPATIENT
Start: 2017-10-20 | End: 2017-11-12

## 2017-10-20 RX ORDER — ALPRAZOLAM 0.5 MG/1
0.5 TABLET ORAL 3 TIMES DAILY
Qty: 90 TAB | Refills: 2 | Status: ON HOLD | OUTPATIENT
Start: 2017-10-20 | End: 2017-10-31

## 2017-10-20 RX ORDER — AMOXICILLIN AND CLAVULANATE POTASSIUM 875; 125 MG/1; MG/1
1 TABLET, FILM COATED ORAL 2 TIMES DAILY
Qty: 8 TAB | Refills: 0 | Status: SHIPPED | OUTPATIENT
Start: 2017-10-20 | End: 2017-10-24

## 2017-10-20 RX ORDER — WHEAT DEXTRIN 3 G/3.8 G
3.8 POWDER (GRAM) ORAL DAILY
Status: ON HOLD | COMMUNITY
End: 2018-01-20

## 2017-10-20 RX ORDER — NYSTATIN 100000 [USP'U]/G
1 POWDER TOPICAL 2 TIMES DAILY PRN
Status: ON HOLD | COMMUNITY
End: 2017-10-31

## 2017-10-20 RX ORDER — OXYCODONE AND ACETAMINOPHEN 10; 325 MG/1; MG/1
1-3 TABLET ORAL EVERY 6 HOURS PRN
Qty: 90 TAB | Refills: 0 | Status: SHIPPED | OUTPATIENT
Start: 2017-10-20 | End: 2017-10-20 | Stop reason: CLARIF

## 2017-10-20 NOTE — PROGRESS NOTES
Subjective:     Chief Complaint   Patient presents with   • Other     surgiacal  cleareance        Roxana Cuba is a 57 y.o. female here today for preop surgical clearance.   Patient is scheduled for surgery on October 25 at [facility] with Dr. Dave Barfield under general anesthesia.     Patient was recently hospitalized 103/17 -10/6/17 for acute on chronic respiratory failure secondary to healthcare acquired pneumonia. Patient was discharged home on doxycycline and Augmentin for a total of 4 days to complete course. Final blood cultures are negative. She reports her breathing is currently at baseline.    Patient has a hx of gastric bypass. As result patient has significant amount of abdominal tissue which has caused significant impairments in her life as well as chronic skin infection.     Patient has a complicated past medical history including insulin-dependent diabetes mellitus, sarcoidosis, liver transplant, chronic respiratory failure, NSTEMI,  pancreatitis, CKD, hepatopulmonary syndrome, COPD, MGUS, and pulmonary hypertension. Patient is on antirejection medication as well as prednisone which results in an immunocompromised state. Patient has had shingles, VRE and C. difficile.      Patient has chronic pain due to post surgical pain and chronic thoracic pain. Patient is currently taking oxycodone 10 mg 3 times a day.      Opioid Risk Tool          Hector each box that applies   Female   Male     Family history of substance abuse       Alcohol   1   3     Illegal drugs   2   3     Rx drugs   4   4     Personal history of substance abuse       Alcohol   3   3     Illegal drugs   4   4     Rx drugs   5   5     Age between 16 -- 35 years   1   1     History of preadolescent sexual abuse   3   0     Psychological disease       ADD, OCD, bipolar, schizophrenia   2   2     Depression   1   1     Total Score  2         Low risk 0-3: Recommend annual UDS     Consequences of Chronic Opiate therapy:   (5 A's)  Analgesia: Improved with medication  Activity: Restricted ADLs , improved with use of medication  Adverse Events:   constipation  Aberrant Behaviors:  None  and patient reports consistent  Affect/Mood: Stable mood, appropriate affect  Signs of oversedation. No signs of withdrawal.       Patient reports severe constipation. Constipation is worsened with use of opiates. Patient has tried over-the-counter medications including MiraLAX, colace and stool softeners without any significant improvement. During hospitalization patient was given Relistor which resolved her constipation.     Patient has anxiety. She is currently taking Xanax as needed up to 3 times a day. Patient states that prednisone makes her anxiety worse. Patient needs to be on prednisone due to sarcoidosis.                 Allergies   Allergen Reactions   • Rhubarb Anaphylaxis   • Organic Nitrates      Nitroglycerin products should be avoided with the use of PDE-5 inhibitors as the combination can result in severe hypotension.   • Vancomycin Hcl      Causes red man syndrome when infused to fast       Current medicines (including changes today)  Current Outpatient Prescriptions   Medication Sig Dispense Refill   • alprazolam (XANAX) 0.5 MG Tab Take 1 Tab by mouth 3 times a day. 90 Tab 2   • oxycodone immediate release (ROXICODONE) 10 MG immediate release tablet Take 1-3 Tabs by mouth every 6 hours as needed for Moderate Pain. 90 Tab 0   • tacrolimus (PROGRAF) 0.5 MG Cap Take 6 Caps by mouth every day. Take with 1 mg = 1.5 mg BID 6 Cap 2   • predniSONE (DELTASONE) 1 MG Tab Take 7 Tabs by mouth every day. 7 Tab 0   • levothyroxine (SYNTHROID) 137 MCG Tab Take 137 mcg by mouth Every morning on an empty stomach.     • ambrisentan (LETAIRIS) 10 MG tablet Take 1 Tab by mouth every day. 30 Tab 11   • pravastatin (PRAVACHOL) 20 MG Tab TAKE 1 TAB BY MOUTH EVERY DAY. 30 Tab 11   • fluticasone (FLONASE) 50 MCG/ACT nasal spray SPRAY 1 SPRAY IN EACH  NOSTRIL TWICE A DAY 48 g 1   • trazodone (DESYREL) 50 MG Tab TAKE 1-2 TABS BY MOUTH EVERY BEDTIME. 90 Tab 2   • Tadalafil, PAH, (ADCIRCA) 20 MG Tab Take 40 mg by mouth every bedtime. Indications: Pulmonary Arterial Hypertension 180 Tab 3   • furosemide (LASIX) 40 MG Tab TAKE 1 TAB BY MOUTH EVERY DAY. 30 Tab 6   • cyclobenzaprine (FLEXERIL) 10 MG Tab Take 10 mg by mouth 3 times a day as needed for Muscle Spasms.     • ferrous sulfate (FEOSOL) 220 (44 FE) MG/5ML Elixir Take 5 mL by mouth every day. 1 Bottle 10   • Linaclotide 290 MCG Cap Take 290 mg by mouth every day. 90 Cap 3   • CITRACAL MAXIMUM 315-250 MG-UNIT Tab TAKE 2 TABS BY MOUTH 2X/ DAY WITH FOOD BEFORE AND AFTER DINNER FOR LIFE-CRUSH 1ST 3 MONTH, SPACE  Tab 11   • ascorbic acid (ASCORBIC ACID) 500 MG Tab Take 500 mg by mouth every day.     • sennosides (SENOKOT) 8.6 MG Tab Take 17.2 mg by mouth 2 times a day.     • Probiotic Product (PROBIOTIC DAILY PO) Take 1 Cap by mouth every day.     • aspirin EC (ECOTRIN) 81 MG Tablet Delayed Response Take 1 Tab by mouth every morning. 90 Tab 4   • gabapentin (NEURONTIN) 600 MG tablet Take 1 Tab by mouth 3 times a day. 270 Tab 4   • omeprazole (PRILOSEC) 40 MG delayed-release capsule Take 1 Cap by mouth every day. 90 Cap 4   • ondansetron (ZOFRAN ODT) 4 MG TABLET DISPERSIBLE Take 1 Tab by mouth every 8 hours as needed for Nausea/Vomiting. Nausea and vomiting 270 Tab 4   • tiotropium (SPIRIVA) 18 MCG Cap Inhale 1 Cap by mouth every day. 90 Cap 4   • sertraline (ZOLOFT) 100 MG Tab Take 100 mg by mouth every bedtime.     • docusate sodium (COLACE) 100 MG Cap Take 100 mg by mouth every day.     • mycophenolate (CELLCEPT) 250 MG Cap Take 500 mg by mouth 2 times a day.     • methylnaltrexone (RELISTOR) 12 MG/0.6ML Solution Inject 0.6 mL as instructed 1 time daily as needed for up to 30 days. 18 mL 2   • amoxicillin-clavulanate (AUGMENTIN) 875-125 MG Tab Take 1 Tab by mouth 2 times a day for 4 days. 8 Tab 0   •  nystatin (MYCOSTATIN) powder Apply 1 Application to affected area(s) 2 times a day as needed.     • Wheat Dextrin (BENEFIBER) Powder Take 3.8 g by mouth every day.     • polyethylene glycol 3350 (MIRALAX) Powder Take 1 g by mouth every day.     • morphine (MS IR) 15 MG tablet Take 1 Tab by mouth every four hours as needed for Severe Pain. 30 Tab 0     No current facility-administered medications for this visit.      Social History   Substance Use Topics   • Smoking status: Passive Smoke Exposure - Never Smoker   • Smokeless tobacco: Never Used      Comment: avoid all tobacco products   • Alcohol use No      Comment: 2009 quit drinking     Family Status   Relation Status   • Father    • Mother    • Sister Alive   • Paternal Aunt    • Maternal Grandmother    • Maternal Grandfather      Family History   Problem Relation Age of Onset   • Other Father      Unknown (dead 10 years)   • Diabetes Father    • Heart Disease Father    • Hypertension Father    • Hyperlipidemia Father    • Stroke Father    • Non-contributory Mother    • Hyperlipidemia Mother    • Alcohol/Drug Mother    • Cancer Paternal Aunt    • Alcohol/Drug Maternal Grandmother    • Alcohol/Drug Maternal Grandfather      She    has a past medical history of Breath shortness; Bronchitis ( ); Cardiomegaly; Chronic fatigue and malaise; Cirrhosis (CMS-HCC) (2011); CKD (chronic kidney disease) stage 3, GFR 30-59 ml/min; Diabetes (CMS-HCC); Fracture of left foot; GERD (gastroesophageal reflux disease); H/O Clostridium difficile infection (17); Hypothyroid; Low back pain (17); Mild aortic regurgitation and aortic valve sclerosis ( ); On home oxygen therapy; Platelet disorder (CMS-HCC); Pneumonia; Psychiatric disorder; Pulmonary hypertension; S/P cholecystectomy; Small bowel obstruction; Splenomegaly; and VRE (vancomycin-resistant Enterococci).        ROS   GEN: no fevers, or chills  HEENT: no blurry vision or change in  "vision  Resp: + chronic  shortness of breath  CV: no racing heart, no irregular beats  Abd:  no vomiting, no diarrhea, + constipation, no blood in stool, no dark stools, no incontinence  : no dysuria, no hematuria, no urinary incontinence, no increased frequency  MSK: LBP, thoracic pain, rib pain  Neuro: no headaches, no dizziness, no LOC       Objective:     Blood pressure 122/74, pulse 72, temperature 37.7 °C (99.8 °F), resp. rate 16, height 1.727 m (5' 7.99\"), weight 80.7 kg (178 lb), last menstrual period 01/03/2000, SpO2 96 %. Body mass index is 27.07 kg/m².   Physical Exam:  Constitutional: Alert, no distress.  Skin: Warm, dry, good turgor  Eye: Equal, round and reactive, conjunctiva clear, lids normal.  ENMT: Lips without lesions,oropharynx non-erythematous,  moist oral mucosa  Neck: Trachea midline, no masses, no thyromegaly. No cervical or supraclavicular lymphadenopathy. Full ROM  Respiratory: Unlabored respiratory effort, good air movement, lungs clear to auscultation, no wheezes, no ronchi, nasal cannula in place.  Cardiovascular:RRR, +S1, S2, no murmur, no peripheral edema, pedal and radial pulses equal and intact bilat  Abdomen: Distended, Soft, non-tender, no masses, no hepatosplenomegaly.  Neuro: no gross motor or sensory deficits      Assessment and Plan:   The following treatment plan was discussed    1. Preoperative clearance  Pt is scheduled for surgery under general anesthesia with  Dr. Dave Barfield  [phone] at [facility].       CLEARED FOR SURGERY      I have fully reviewed past medical, surgical, social, and family histories. All chronic medical conditions are adequately controlled.  Risk of surgery discussed with pt. Pt is at high risk for surgery. I recommend stopping aspirin 5 days prior. I recommend close monitoring of vital signs including EKG, SPO2 and BP during procedure. Recommend having nebulized treatments in the operating room. Recommend close monitoring for " signs of infection due to immunocompromise state. Recommend monitoring for signs of opiate and benzo withdrawal.    2. Intertriginous dermatitis associated with moisture  Patient has recurrent intertrigo (skin infection) of abdominal pannus status post significant weight loss due to gastric surgery. Patient is at increased risk for infection due to immunocompromised state. Patient requires surgical removal due to recurrence of infection. All surgeries need to take place under Dr. Canada's supervision at [facility] [place].    3. Thrombocytopenia (CMS-HCC)  Chronic: stable, thrombocytopenia will increase patient's risk for surgery     4. MGUS (monoclonal gammopathy of unknown significance)  Chronic: stable    5. Chronic respiratory failure with hypoxia (CMS-HCC)  Chronic: stable, patient is increased risk for surgery due to hypoxia. Recommend close monitoring of vitals during surgery.    6. Chronic obstructive pulmonary disease, unspecified COPD type (CMS-HCC)  Chronic: stable. Recommend albuterol nebulized treatments on hand during surgery.   Recommend patient have Augmentin on hand for any signs of COPD exacerbation.  - amoxicillin-clavulanate (AUGMENTIN) 875-125 MG Tab; Take 1 Tab by mouth 2 times a day for 4 days.  Dispense: 8 Tab; Refill: 0    7. Pulmonary hypertension  Chronic: stable    8. Immunocompromised state (CMS-HCC)  Chronic: Immunocompromise state will increase patient's risk for surgery. Recommend close monitoring of patient's postsurgical wounds for any signs of infection    9. Status post liver transplant Dr. Canada ([facility])  Chronic: stable, antirejection medications resulting in patient's immunocompromise state.    10. Sarcoidosis (CMS-HCC)  Chronic: stable    11. Hepatopulmonary syndrome (CMS-HCC)  Chronic    12. CKD (chronic kidney disease) stage 3, GFR 30-59 ml/min- unclear cause, probably multifactorial  Chronic: stable, most recent GFR54    13. IDDM (insulin dependent diabetes mellitus)  (CMS-[facility])  Chronic: well controlled there is noted to be a slight elevation in patient's recent A1c. Recommend continuing close monitoring   - HEMOGLOBIN A1C; Future    14. Drug-induced constipation  Poorly controlled: Recommend Relistor which patient has taken in the hospital. As noted above patient has tried multiple over-the-counter prescription medications without any improvement. Recommending holding Linzess Benefiber, Colace, MiraLAX, and Senokot as needed with new medication starting.  - methylnaltrexone (RELISTOR) 12 MG/0.6ML Solution; Inject 0.6 mL as instructed 1 time daily as needed for up to 30 days.  Dispense: 18 mL; Refill: 2    15. Chronic use of opiate drugs therapeutic purposes  16. Chronic bilateral thoracic back pain    Recommend close monitoring of pain after surgery. Recommend close monitoring for signs of opiate withdrawal.    - I stressed the importance of  non-narcotic treatments to control pain including  NSAIDs, PT, surgery, gabapentin, Cymbalta, topical analgesics,home exercise plan, and pain management for injections.    -  I reviewed medications. The medications have  been  helpful for controlling the pain, maintaining mood, and allowing the patient to function, including ADLs.     - No aberrant behavior noted.  -  I reviewed diagnostic studies. I reviewed lab studies.  I reviewed medical issues. I reviewed social issues.  - I have obtained and reviewed patient utilization report from [facility]. The pt has a meaningful improvement in function and pain without significant risk of harm. Based on my assessment through pt's  Report of pain, physical exam, diagnostic imaging, and review of records including , the controlled medicine prescriptions written today are medically necessary.  -The patient was advised to avoid alcohol use with prescribed medication. I counseled the patient regarding risks, side effects, and interactions of medications. Pt is advised to take no drugs  and  take only medications reported to this office and prescribed.  I counseled the patient on the controlled substance prescribing program. I reviewed further symptomatic medications. I advised the pt of risk of use of NSAIDs for PUD and renal concerns.   -No acetaminophen due to liver transplant     Date of Last therapy agreement/information sheet: 7/2017  Opiate Risk score: 2  Frequency of UDS: annual  Date of last UDS: April 2017   verified: today  Discrepancies: none  - methylnaltrexone (RELISTOR) 12 MG/0.6ML Solution; Inject 0.6 mL as instructed 1 time daily as needed for up to 30 days.  Dispense: 18 mL; Refill: 2  - oxycodone immediate release (ROXICODONE) 10 MG immediate release tablet; Take 1-3 Tabs by mouth every 6 hours as needed for Moderate Pain.  Dispense: 90 Tab; Refill: 0  - oxycodone immediate release (ROXICODONE) 10 MG immediate release tablet; Take 1-3 Tabs by mouth every 6 hours as needed for Moderate Pain.  Dispense: 90 Tab; Refill: 0  - oxycodone immediate release (ROXICODONE) 10 MG immediate release tablet; Take 1-3 Tabs by mouth every 6 hours as needed for Moderate Pain.  Dispense: 90 Tab; Refill: 0      17. Chronic prescription benzodiazepine use  18. Anxiety  Recommend closely monitoring patient after surgery for signs of benzo withdrawal.  verified.  - alprazolam (XANAX) 0.5 MG Tab; Take 1 Tab by mouth 3 times a day.  Dispense: 90 Tab; Refill: 2    19. Splenomegaly  Chronic    20. Pure hypercholesterolemia  Chronic: stable    21. Mild mitral regurgitation  Chronic: stable, recommend close cardiac monitoring during and after surgery      22. Mild aortic regurgitation and aortic valve sclerosis  Chronic: stable, recommend close cardiac monitoring during and after surgery      23. Hypothyroidism, adult  Chronic: stable    24. Hx of gastric bypass  Patient's significant weight loss has resulted in pannus as noted above    25. History of pancreatitis  Commend close monitoring for signs of  pancreatitis    26. H/O non-ST elevation myocardial infarction (NSTEMI)   Recommend close cardiac monitoring during and after surgery      27. Recurrent major depressive disorder, in partial remission (CMS-HCC)  Patient reports mood is stable. She is eager for surgery.    28. Gastroesophageal reflux disease, esophagitis presence not specified  Chronic: The commend close monitoring during surgery for his aspiration        This note is for H&P surgical clearance for surgery on October 25th at [facility].       Followup: Return in about 3 months (around 1/20/2018) for chonric pain.    Please note that this dictation was created using voice recognition software. I have made every reasonable attempt to correct obvious errors, but I expect that there are errors of grammar and possibly content that I did not discover before finalizing the note.        This was a 40 minute visit of which >80% was spent face to face.

## 2017-10-23 ENCOUNTER — TELEPHONE (OUTPATIENT)
Dept: MEDICAL GROUP | Facility: PHYSICIAN GROUP | Age: 57
End: 2017-10-23

## 2017-10-23 DIAGNOSIS — K59.03 DRUG-INDUCED CONSTIPATION: ICD-10-CM

## 2017-10-23 NOTE — TELEPHONE ENCOUNTER
MEDICATION PRIOR AUTHORIZATION NEEDED:    1. Name of Medication: methylnaltrexone (RELISTOR) 12 MG/0.6ML Solution      2. Requested By (Name of Pharmacy): [facility]     3. Is insurance on file current? Yes    4. What is the name & phone number of the 3rd party payor? [employer]     Done Via [email or web address] pending outcome

## 2017-10-27 ENCOUNTER — APPOINTMENT (OUTPATIENT)
Dept: RADIOLOGY | Facility: MEDICAL CENTER | Age: 57
DRG: 862 | End: 2017-10-27
Attending: EMERGENCY MEDICINE
Payer: MEDICARE

## 2017-10-27 ENCOUNTER — HOSPITAL ENCOUNTER (INPATIENT)
Facility: MEDICAL CENTER | Age: 57
LOS: 4 days | DRG: 862 | End: 2017-10-31
Attending: EMERGENCY MEDICINE | Admitting: HOSPITALIST
Payer: MEDICARE

## 2017-10-27 ENCOUNTER — RESOLUTE PROFESSIONAL BILLING HOSPITAL PROF FEE (OUTPATIENT)
Dept: HOSPITALIST | Facility: MEDICAL CENTER | Age: 57
End: 2017-10-27
Payer: MEDICARE

## 2017-10-27 DIAGNOSIS — G89.29 CHRONIC BILATERAL THORACIC BACK PAIN: ICD-10-CM

## 2017-10-27 DIAGNOSIS — A41.9 SEPSIS, DUE TO UNSPECIFIED ORGANISM: ICD-10-CM

## 2017-10-27 DIAGNOSIS — G89.4 CHRONIC PAIN SYNDROME: ICD-10-CM

## 2017-10-27 DIAGNOSIS — I27.20 PULMONARY HYPERTENSION (HCC): ICD-10-CM

## 2017-10-27 DIAGNOSIS — I95.9 HYPOTENSION, UNSPECIFIED HYPOTENSION TYPE: ICD-10-CM

## 2017-10-27 DIAGNOSIS — J96.11 CHRONIC RESPIRATORY FAILURE WITH HYPOXIA (HCC): ICD-10-CM

## 2017-10-27 DIAGNOSIS — M54.6 CHRONIC BILATERAL THORACIC BACK PAIN: ICD-10-CM

## 2017-10-27 DIAGNOSIS — Z79.891 CHRONIC USE OF OPIATE DRUGS THERAPEUTIC PURPOSES: ICD-10-CM

## 2017-10-27 DIAGNOSIS — Z79.899 CHRONIC PRESCRIPTION BENZODIAZEPINE USE: ICD-10-CM

## 2017-10-27 LAB
ALBUMIN SERPL BCP-MCNC: 3.5 G/DL (ref 3.2–4.9)
ALBUMIN/GLOB SERPL: 1.8 G/DL
ALP SERPL-CCNC: 21 U/L (ref 30–99)
ALT SERPL-CCNC: 8 U/L (ref 2–50)
ANION GAP SERPL CALC-SCNC: 8 MMOL/L (ref 0–11.9)
APPEARANCE UR: CLEAR
APTT PPP: 25.1 SEC (ref 24.7–36)
AST SERPL-CCNC: 16 U/L (ref 12–45)
BASOPHILS # BLD AUTO: 0.4 % (ref 0–1.8)
BASOPHILS # BLD: 0.02 K/UL (ref 0–0.12)
BILIRUB SERPL-MCNC: 0.6 MG/DL (ref 0.1–1.5)
BILIRUB UR QL STRIP.AUTO: NEGATIVE
BNP SERPL-MCNC: 70 PG/ML (ref 0–100)
BUN SERPL-MCNC: 10 MG/DL (ref 8–22)
CALCIUM SERPL-MCNC: 8.9 MG/DL (ref 8.5–10.5)
CHLORIDE SERPL-SCNC: 108 MMOL/L (ref 96–112)
CO2 SERPL-SCNC: 23 MMOL/L (ref 20–33)
COLOR UR: YELLOW
CREAT SERPL-MCNC: 0.97 MG/DL (ref 0.5–1.4)
EOSINOPHIL # BLD AUTO: 0.23 K/UL (ref 0–0.51)
EOSINOPHIL NFR BLD: 4.5 % (ref 0–6.9)
ERYTHROCYTE [DISTWIDTH] IN BLOOD BY AUTOMATED COUNT: 46.1 FL (ref 35.9–50)
GFR SERPL CREATININE-BSD FRML MDRD: 59 ML/MIN/1.73 M 2
GLOBULIN SER CALC-MCNC: 1.9 G/DL (ref 1.9–3.5)
GLUCOSE BLD-MCNC: 102 MG/DL (ref 65–99)
GLUCOSE BLD-MCNC: 129 MG/DL (ref 65–99)
GLUCOSE SERPL-MCNC: 132 MG/DL (ref 65–99)
GLUCOSE UR STRIP.AUTO-MCNC: NEGATIVE MG/DL
HCT VFR BLD AUTO: 31 % (ref 37–47)
HGB BLD-MCNC: 9.9 G/DL (ref 12–16)
IMM GRANULOCYTES # BLD AUTO: 0.02 K/UL (ref 0–0.11)
IMM GRANULOCYTES NFR BLD AUTO: 0.4 % (ref 0–0.9)
INR PPP: 1.07 (ref 0.87–1.13)
KETONES UR STRIP.AUTO-MCNC: NEGATIVE MG/DL
LACTATE BLD-SCNC: 0.5 MMOL/L (ref 0.5–2)
LACTATE BLD-SCNC: 0.7 MMOL/L (ref 0.5–2)
LEUKOCYTE ESTERASE UR QL STRIP.AUTO: NEGATIVE
LYMPHOCYTES # BLD AUTO: 0.65 K/UL (ref 1–4.8)
LYMPHOCYTES NFR BLD: 12.6 % (ref 22–41)
MCH RBC QN AUTO: 29 PG (ref 27–33)
MCHC RBC AUTO-ENTMCNC: 31.9 G/DL (ref 33.6–35)
MCV RBC AUTO: 90.9 FL (ref 81.4–97.8)
MICRO URNS: NORMAL
MONOCYTES # BLD AUTO: 0.31 K/UL (ref 0–0.85)
MONOCYTES NFR BLD AUTO: 6 % (ref 0–13.4)
NEUTROPHILS # BLD AUTO: 3.91 K/UL (ref 2–7.15)
NEUTROPHILS NFR BLD: 76.1 % (ref 44–72)
NITRITE UR QL STRIP.AUTO: NEGATIVE
NRBC # BLD AUTO: 0 K/UL
NRBC BLD AUTO-RTO: 0 /100 WBC
PH UR STRIP.AUTO: 6 [PH]
PLATELET # BLD AUTO: 57 K/UL (ref 164–446)
PMV BLD AUTO: 8.8 FL (ref 9–12.9)
POTASSIUM SERPL-SCNC: 3.7 MMOL/L (ref 3.6–5.5)
PROT SERPL-MCNC: 5.4 G/DL (ref 6–8.2)
PROT UR QL STRIP: NEGATIVE MG/DL
PROTHROMBIN TIME: 13.6 SEC (ref 12–14.6)
RBC # BLD AUTO: 3.41 M/UL (ref 4.2–5.4)
RBC UR QL AUTO: NEGATIVE
SODIUM SERPL-SCNC: 139 MMOL/L (ref 135–145)
SP GR UR STRIP.AUTO: 1.02
TROPONIN I SERPL-MCNC: <0.01 NG/ML (ref 0–0.04)
UROBILINOGEN UR STRIP.AUTO-MCNC: 0.2 MG/DL
WBC # BLD AUTO: 5.1 K/UL (ref 4.8–10.8)

## 2017-10-27 PROCEDURE — 82962 GLUCOSE BLOOD TEST: CPT

## 2017-10-27 PROCEDURE — A9270 NON-COVERED ITEM OR SERVICE: HCPCS | Performed by: HOSPITALIST

## 2017-10-27 PROCEDURE — 99291 CRITICAL CARE FIRST HOUR: CPT | Performed by: HOSPITALIST

## 2017-10-27 PROCEDURE — 80053 COMPREHEN METABOLIC PANEL: CPT

## 2017-10-27 PROCEDURE — 700105 HCHG RX REV CODE 258

## 2017-10-27 PROCEDURE — 87086 URINE CULTURE/COLONY COUNT: CPT

## 2017-10-27 PROCEDURE — 83605 ASSAY OF LACTIC ACID: CPT

## 2017-10-27 PROCEDURE — 700105 HCHG RX REV CODE 258: Performed by: HOSPITALIST

## 2017-10-27 PROCEDURE — 96366 THER/PROPH/DIAG IV INF ADDON: CPT

## 2017-10-27 PROCEDURE — 83880 ASSAY OF NATRIURETIC PEPTIDE: CPT

## 2017-10-27 PROCEDURE — 82533 TOTAL CORTISOL: CPT

## 2017-10-27 PROCEDURE — 71010 DX-CHEST-PORTABLE (1 VIEW): CPT

## 2017-10-27 PROCEDURE — 85610 PROTHROMBIN TIME: CPT

## 2017-10-27 PROCEDURE — 96365 THER/PROPH/DIAG IV INF INIT: CPT

## 2017-10-27 PROCEDURE — 85025 COMPLETE CBC W/AUTO DIFF WBC: CPT

## 2017-10-27 PROCEDURE — 700111 HCHG RX REV CODE 636 W/ 250 OVERRIDE (IP): Performed by: HOSPITALIST

## 2017-10-27 PROCEDURE — 74177 CT ABD & PELVIS W/CONTRAST: CPT

## 2017-10-27 PROCEDURE — 770022 HCHG ROOM/CARE - ICU (200)

## 2017-10-27 PROCEDURE — 700102 HCHG RX REV CODE 250 W/ 637 OVERRIDE(OP): Performed by: HOSPITALIST

## 2017-10-27 PROCEDURE — 99291 CRITICAL CARE FIRST HOUR: CPT

## 2017-10-27 PROCEDURE — 81003 URINALYSIS AUTO W/O SCOPE: CPT

## 2017-10-27 PROCEDURE — 85730 THROMBOPLASTIN TIME PARTIAL: CPT

## 2017-10-27 PROCEDURE — 96367 TX/PROPH/DG ADDL SEQ IV INF: CPT

## 2017-10-27 PROCEDURE — 36415 COLL VENOUS BLD VENIPUNCTURE: CPT

## 2017-10-27 PROCEDURE — 87040 BLOOD CULTURE FOR BACTERIA: CPT | Mod: 91

## 2017-10-27 PROCEDURE — 700111 HCHG RX REV CODE 636 W/ 250 OVERRIDE (IP)

## 2017-10-27 PROCEDURE — 700111 HCHG RX REV CODE 636 W/ 250 OVERRIDE (IP): Performed by: EMERGENCY MEDICINE

## 2017-10-27 PROCEDURE — 700105 HCHG RX REV CODE 258: Performed by: EMERGENCY MEDICINE

## 2017-10-27 PROCEDURE — 304562 HCHG STAT O2 MASK/CANNULA

## 2017-10-27 PROCEDURE — 84484 ASSAY OF TROPONIN QUANT: CPT

## 2017-10-27 PROCEDURE — 700117 HCHG RX CONTRAST REV CODE 255: Performed by: EMERGENCY MEDICINE

## 2017-10-27 RX ORDER — SODIUM CHLORIDE 9 MG/ML
30 INJECTION, SOLUTION INTRAVENOUS
Status: DISCONTINUED | OUTPATIENT
Start: 2017-10-27 | End: 2017-10-31 | Stop reason: HOSPADM

## 2017-10-27 RX ORDER — SODIUM CHLORIDE 9 MG/ML
1000 INJECTION, SOLUTION INTRAVENOUS ONCE
Status: COMPLETED | OUTPATIENT
Start: 2017-10-27 | End: 2017-10-27

## 2017-10-27 RX ORDER — SULFAMETHOXAZOLE AND TRIMETHOPRIM 800; 160 MG/1; MG/1
1 TABLET ORAL 2 TIMES DAILY
Status: ON HOLD | COMMUNITY
Start: 2017-10-26 | End: 2017-10-31

## 2017-10-27 RX ORDER — ONDANSETRON 2 MG/ML
4 INJECTION INTRAMUSCULAR; INTRAVENOUS EVERY 4 HOURS PRN
Status: DISCONTINUED | OUTPATIENT
Start: 2017-10-27 | End: 2017-10-31 | Stop reason: HOSPADM

## 2017-10-27 RX ORDER — OMEPRAZOLE 20 MG/1
40 CAPSULE, DELAYED RELEASE ORAL DAILY
Status: DISCONTINUED | OUTPATIENT
Start: 2017-10-28 | End: 2017-10-31 | Stop reason: HOSPADM

## 2017-10-27 RX ORDER — ONDANSETRON 4 MG/1
4 TABLET, ORALLY DISINTEGRATING ORAL EVERY 4 HOURS PRN
Status: DISCONTINUED | OUTPATIENT
Start: 2017-10-27 | End: 2017-10-31 | Stop reason: HOSPADM

## 2017-10-27 RX ORDER — POLYETHYLENE GLYCOL 3350 17 G/17G
1 POWDER, FOR SOLUTION ORAL
Status: DISCONTINUED | OUTPATIENT
Start: 2017-10-27 | End: 2017-10-31 | Stop reason: HOSPADM

## 2017-10-27 RX ORDER — OXYCODONE HYDROCHLORIDE 10 MG/1
10-30 TABLET ORAL EVERY 6 HOURS PRN
Status: DISCONTINUED | OUTPATIENT
Start: 2017-10-27 | End: 2017-10-27

## 2017-10-27 RX ORDER — OXYCODONE HYDROCHLORIDE 30 MG/1
30 TABLET ORAL EVERY 6 HOURS PRN
Status: DISCONTINUED | OUTPATIENT
Start: 2017-10-27 | End: 2017-10-28

## 2017-10-27 RX ORDER — CEPHALEXIN 500 MG/1
500 CAPSULE ORAL 4 TIMES DAILY
Status: ON HOLD | COMMUNITY
Start: 2017-10-26 | End: 2017-10-31

## 2017-10-27 RX ORDER — SODIUM CHLORIDE 9 MG/ML
INJECTION, SOLUTION INTRAVENOUS
Status: DISCONTINUED
Start: 2017-10-27 | End: 2017-10-28

## 2017-10-27 RX ORDER — TIOTROPIUM BROMIDE 18 UG/1
1 CAPSULE ORAL; RESPIRATORY (INHALATION) DAILY
Status: DISCONTINUED | OUTPATIENT
Start: 2017-10-28 | End: 2017-10-31 | Stop reason: HOSPADM

## 2017-10-27 RX ORDER — CIPROFLOXACIN 2 MG/ML
400 INJECTION, SOLUTION INTRAVENOUS EVERY 12 HOURS
Status: DISCONTINUED | OUTPATIENT
Start: 2017-10-27 | End: 2017-10-27

## 2017-10-27 RX ORDER — MORPHINE SULFATE 4 MG/ML
2-4 INJECTION, SOLUTION INTRAMUSCULAR; INTRAVENOUS EVERY 4 HOURS PRN
Status: DISCONTINUED | OUTPATIENT
Start: 2017-10-27 | End: 2017-10-28

## 2017-10-27 RX ORDER — LEVOTHYROXINE SODIUM 137 UG/1
137 TABLET ORAL
Status: DISCONTINUED | OUTPATIENT
Start: 2017-10-28 | End: 2017-10-31 | Stop reason: HOSPADM

## 2017-10-27 RX ORDER — CYCLOBENZAPRINE HCL 10 MG
10 TABLET ORAL 3 TIMES DAILY PRN
Status: DISCONTINUED | OUTPATIENT
Start: 2017-10-27 | End: 2017-10-31 | Stop reason: HOSPADM

## 2017-10-27 RX ORDER — OXYCODONE HYDROCHLORIDE 10 MG/1
20 TABLET ORAL EVERY 6 HOURS PRN
Status: DISCONTINUED | OUTPATIENT
Start: 2017-10-27 | End: 2017-10-28

## 2017-10-27 RX ORDER — MORPHINE SULFATE 4 MG/ML
2 INJECTION, SOLUTION INTRAMUSCULAR; INTRAVENOUS EVERY 4 HOURS PRN
Status: DISCONTINUED | OUTPATIENT
Start: 2017-10-27 | End: 2017-10-27

## 2017-10-27 RX ORDER — TACROLIMUS 0.5 MG/1
0.5 CAPSULE ORAL EVERY 12 HOURS
Status: ON HOLD | COMMUNITY
End: 2021-08-03

## 2017-10-27 RX ORDER — DEXTROSE MONOHYDRATE 25 G/50ML
25 INJECTION, SOLUTION INTRAVENOUS
Status: DISCONTINUED | OUTPATIENT
Start: 2017-10-27 | End: 2017-10-31 | Stop reason: HOSPADM

## 2017-10-27 RX ORDER — TACROLIMUS 0.5 MG/1
1.5 CAPSULE ORAL 2 TIMES DAILY
Status: DISCONTINUED | OUTPATIENT
Start: 2017-10-27 | End: 2017-10-31 | Stop reason: HOSPADM

## 2017-10-27 RX ORDER — SODIUM CHLORIDE 9 MG/ML
30 INJECTION, SOLUTION INTRAVENOUS ONCE
Status: COMPLETED | OUTPATIENT
Start: 2017-10-27 | End: 2017-10-27

## 2017-10-27 RX ORDER — BISACODYL 10 MG
10 SUPPOSITORY, RECTAL RECTAL
Status: DISCONTINUED | OUTPATIENT
Start: 2017-10-27 | End: 2017-10-31 | Stop reason: HOSPADM

## 2017-10-27 RX ORDER — MORPHINE SULFATE 30 MG/1
15 TABLET ORAL EVERY 4 HOURS PRN
Status: DISCONTINUED | OUTPATIENT
Start: 2017-10-27 | End: 2017-10-28

## 2017-10-27 RX ORDER — SODIUM CHLORIDE 9 MG/ML
500 INJECTION, SOLUTION INTRAVENOUS ONCE
Status: COMPLETED | OUTPATIENT
Start: 2017-10-28 | End: 2017-10-27

## 2017-10-27 RX ORDER — GABAPENTIN 300 MG/1
600 CAPSULE ORAL 3 TIMES DAILY
Status: DISCONTINUED | OUTPATIENT
Start: 2017-10-27 | End: 2017-10-31 | Stop reason: HOSPADM

## 2017-10-27 RX ORDER — SODIUM CHLORIDE 9 MG/ML
500 INJECTION, SOLUTION INTRAVENOUS
Status: COMPLETED | OUTPATIENT
Start: 2017-10-27 | End: 2017-10-27

## 2017-10-27 RX ORDER — PROMETHAZINE HYDROCHLORIDE 25 MG/1
12.5-25 SUPPOSITORY RECTAL EVERY 4 HOURS PRN
Status: DISCONTINUED | OUTPATIENT
Start: 2017-10-27 | End: 2017-10-31 | Stop reason: HOSPADM

## 2017-10-27 RX ORDER — MYCOPHENOLATE MOFETIL 250 MG/1
500 CAPSULE ORAL 2 TIMES DAILY
Status: DISCONTINUED | OUTPATIENT
Start: 2017-10-27 | End: 2017-10-31 | Stop reason: HOSPADM

## 2017-10-27 RX ORDER — AMBRISENTAN 10 MG/1
10 TABLET, FILM COATED ORAL DAILY
Status: DISCONTINUED | OUTPATIENT
Start: 2017-10-27 | End: 2017-10-31 | Stop reason: HOSPADM

## 2017-10-27 RX ORDER — PROMETHAZINE HYDROCHLORIDE 25 MG/1
12.5-25 TABLET ORAL EVERY 4 HOURS PRN
Status: DISCONTINUED | OUTPATIENT
Start: 2017-10-27 | End: 2017-10-31 | Stop reason: HOSPADM

## 2017-10-27 RX ORDER — AMOXICILLIN 250 MG
2 CAPSULE ORAL 2 TIMES DAILY
Status: DISCONTINUED | OUTPATIENT
Start: 2017-10-27 | End: 2017-10-31 | Stop reason: HOSPADM

## 2017-10-27 RX ORDER — TADALAFIL 20 MG/1
40 TABLET ORAL
Status: DISCONTINUED | OUTPATIENT
Start: 2017-10-27 | End: 2017-10-31 | Stop reason: HOSPADM

## 2017-10-27 RX ORDER — OXYCODONE HYDROCHLORIDE 10 MG/1
10 TABLET ORAL EVERY 6 HOURS PRN
Status: DISCONTINUED | OUTPATIENT
Start: 2017-10-27 | End: 2017-10-28

## 2017-10-27 RX ORDER — PRAVASTATIN SODIUM 20 MG
20 TABLET ORAL DAILY
Status: DISCONTINUED | OUTPATIENT
Start: 2017-10-27 | End: 2017-10-31 | Stop reason: HOSPADM

## 2017-10-27 RX ADMIN — SODIUM CHLORIDE 2355 ML: 9 INJECTION, SOLUTION INTRAVENOUS at 12:53

## 2017-10-27 RX ADMIN — SODIUM CHLORIDE 500 ML: 9 INJECTION, SOLUTION INTRAVENOUS at 23:44

## 2017-10-27 RX ADMIN — TAZOBACTAM SODIUM AND PIPERACILLIN SODIUM 4.5 G: 500; 4 INJECTION, SOLUTION INTRAVENOUS at 11:35

## 2017-10-27 RX ADMIN — OXYCODONE HYDROCHLORIDE 30 MG: 30 TABLET ORAL at 16:10

## 2017-10-27 RX ADMIN — SODIUM CHLORIDE 500 ML: 9 INJECTION, SOLUTION INTRAVENOUS at 22:28

## 2017-10-27 RX ADMIN — GABAPENTIN 600 MG: 300 CAPSULE ORAL at 16:09

## 2017-10-27 RX ADMIN — IOHEXOL 100 ML: 350 INJECTION, SOLUTION INTRAVENOUS at 12:47

## 2017-10-27 RX ADMIN — TADALAFIL 40 MG: 20 TABLET ORAL at 21:00

## 2017-10-27 RX ADMIN — MORPHINE SULFATE 15 MG: 30 TABLET ORAL at 13:50

## 2017-10-27 RX ADMIN — ONDANSETRON 4 MG: 4 TABLET, ORALLY DISINTEGRATING ORAL at 13:49

## 2017-10-27 RX ADMIN — SODIUM CHLORIDE 1000 ML: 9 INJECTION, SOLUTION INTRAVENOUS at 11:09

## 2017-10-27 RX ADMIN — MORPHINE SULFATE 15 MG: 30 TABLET ORAL at 19:29

## 2017-10-27 RX ADMIN — GABAPENTIN 600 MG: 300 CAPSULE ORAL at 20:19

## 2017-10-27 RX ADMIN — MORPHINE SULFATE 2 MG: 4 INJECTION INTRAVENOUS at 16:38

## 2017-10-27 RX ADMIN — POLYETHYLENE GLYCOL 3350 1 PACKET: 17 POWDER, FOR SOLUTION ORAL at 20:20

## 2017-10-27 RX ADMIN — AMBRISENTAN 10 MG: 10 TABLET, FILM COATED ORAL at 15:00

## 2017-10-27 RX ADMIN — MYCOPHENOLATE MOFETIL 500 MG: 250 CAPSULE ORAL at 20:19

## 2017-10-27 RX ADMIN — STANDARDIZED SENNA CONCENTRATE AND DOCUSATE SODIUM 2 TABLET: 8.6; 5 TABLET, FILM COATED ORAL at 20:19

## 2017-10-27 RX ADMIN — SODIUM CHLORIDE 1000 ML: 9 INJECTION, SOLUTION INTRAVENOUS at 16:38

## 2017-10-27 RX ADMIN — TACROLIMUS 1.5 MG: 0.5 CAPSULE ORAL at 20:20

## 2017-10-27 RX ADMIN — PRAVASTATIN SODIUM 20 MG: 20 TABLET ORAL at 16:10

## 2017-10-27 RX ADMIN — VANCOMYCIN HYDROCHLORIDE 2000 MG: 100 INJECTION, POWDER, LYOPHILIZED, FOR SOLUTION INTRAVENOUS at 12:54

## 2017-10-27 RX ADMIN — TAZOBACTAM SODIUM AND PIPERACILLIN SODIUM 4.5 G: 500; 4 INJECTION, SOLUTION INTRAVENOUS at 21:00

## 2017-10-27 RX ADMIN — MORPHINE SULFATE 4 MG: 4 INJECTION INTRAVENOUS at 21:10

## 2017-10-27 ASSESSMENT — PAIN SCALES - GENERAL
PAINLEVEL_OUTOF10: 10
PAINLEVEL_OUTOF10: 4
PAINLEVEL_OUTOF10: 9
PAINLEVEL_OUTOF10: 0
PAINLEVEL_OUTOF10: 10
PAINLEVEL_OUTOF10: 9
PAINLEVEL_OUTOF10: 9
PAINLEVEL_OUTOF10: 4

## 2017-10-27 ASSESSMENT — ENCOUNTER SYMPTOMS
HEADACHES: 0
BACK PAIN: 1
NAUSEA: 0
SHORTNESS OF BREATH: 1
DIARRHEA: 0
PALPITATIONS: 0
DIZZINESS: 0
MYALGIAS: 0
PHOTOPHOBIA: 0
DEPRESSION: 0
COUGH: 0
ABDOMINAL PAIN: 1
WHEEZING: 0
TINGLING: 0
SHORTNESS OF BREATH: 0
FOCAL WEAKNESS: 0
FEVER: 1
VOMITING: 0
CONSTIPATION: 1
ABDOMINAL PAIN: 0
CHILLS: 1
SORE THROAT: 0

## 2017-10-27 ASSESSMENT — LIFESTYLE VARIABLES
EVER_SMOKED: NEVER
DO YOU DRINK ALCOHOL: NO
DO YOU DRINK ALCOHOL: NO
EVER_SMOKED: NEVER

## 2017-10-27 ASSESSMENT — COPD QUESTIONNAIRES
COPD SCREENING SCORE: 1
COPD SCREENING SCORE: 1
DO YOU EVER COUGH UP ANY MUCUS OR PHLEGM?: NO/ONLY WITH OCCASIONAL COLDS OR INFECTIONS
DURING THE PAST 4 WEEKS HOW MUCH DID YOU FEEL SHORT OF BREATH: NONE/LITTLE OF THE TIME
HAVE YOU SMOKED AT LEAST 100 CIGARETTES IN YOUR ENTIRE LIFE: NO/DON'T KNOW
HAVE YOU SMOKED AT LEAST 100 CIGARETTES IN YOUR ENTIRE LIFE: NO/DON'T KNOW
DO YOU EVER COUGH UP ANY MUCUS OR PHLEGM?: NO/ONLY WITH OCCASIONAL COLDS OR INFECTIONS
DURING THE PAST 4 WEEKS HOW MUCH DID YOU FEEL SHORT OF BREATH: NONE/LITTLE OF THE TIME

## 2017-10-27 ASSESSMENT — PATIENT HEALTH QUESTIONNAIRE - PHQ9
SUM OF ALL RESPONSES TO PHQ QUESTIONS 1-9: 0
SUM OF ALL RESPONSES TO PHQ9 QUESTIONS 1 AND 2: 0
1. LITTLE INTEREST OR PLEASURE IN DOING THINGS: NOT AT ALL
2. FEELING DOWN, DEPRESSED, IRRITABLE, OR HOPELESS: NOT AT ALL
2. FEELING DOWN, DEPRESSED, IRRITABLE, OR HOPELESS: NOT AT ALL
SUM OF ALL RESPONSES TO PHQ QUESTIONS 1-9: 0
1. LITTLE INTEREST OR PLEASURE IN DOING THINGS: NOT AT ALL
SUM OF ALL RESPONSES TO PHQ9 QUESTIONS 1 AND 2: 0

## 2017-10-27 NOTE — H&P
Hospital Medicine History and Physical    Date of Service  10/27/2017    Chief Complaint  Chief Complaint   Patient presents with   • Hypotension   • Fever       History of Presenting Illness  57 y.o. female who presented on 10/27/2017 with  Fever, shortness of breath, and general feeling unwell. The patient is approximately postop day #2 from a panniculectomy in [place]. She states that she has not felt well since her discharge from the hospital. Patient carries a known history of cirrhosis of the liver and is status post liver transplantation approximately 5 years ago and remains on immunosuppressive therapy daily. Patient reports fevers, chills, shortness of breath, and malaise. She also reports worsening of her chronic lower back pain. Patient describes sharp achy abdominal pain around her surgical site which is to be expected. She otherwise denies any chest pain, diarrhea, or dysuria. She's had no productive cough.  Upon assessment in the ER, the patient was noted to be tachycardic and hypotensive.      Primary Care Physician  Bernarda Damon D.O.    Consultants  None    Code Status  Full    Review of Systems  Review of Systems   Constitutional: Positive for chills, fever and malaise/fatigue.   HENT: Negative for congestion and sore throat.    Eyes: Negative for photophobia.   Respiratory: Negative for cough, shortness of breath and wheezing.    Cardiovascular: Negative for chest pain and palpitations.   Gastrointestinal: Negative for abdominal pain, diarrhea, nausea and vomiting.   Genitourinary: Negative for dysuria.   Musculoskeletal: Negative for myalgias.   Skin: Negative.    Neurological: Negative for dizziness, tingling, focal weakness and headaches.   Psychiatric/Behavioral: Negative for depression and suicidal ideas.        Past Medical History  Past Medical History:   Diagnosis Date   • Low back pain 02-17-17    and left foot, 7/10   • H/O Clostridium difficile infection 02-17-17     "reports \"16 months ago\". Current stool sample 01-26-17 neg   • Cirrhosis (CMS-HCC) December 2011    [place] [facility] [place]   • Breath shortness    • Bronchitis      2016   • Cardiomegaly    • Chronic fatigue and malaise    • CKD (chronic kidney disease) stage 3, GFR 30-59 ml/min    • Diabetes (CMS-HCC)     reports hx of, resolved w/weight loss. Reports still checking bloodsugars twice daily and using insulin PRN   • Fracture of left foot    • GERD (gastroesophageal reflux disease)         • Hypothyroid    • Mild aortic regurgitation and aortic valve sclerosis     • On home oxygen therapy     4 liters, Dr. Gaming   • Platelet disorder (CMS-HCC)     low platelets   • Pneumonia     aspiration,    • Psychiatric disorder     Mood disorder   • Pulmonary hypertension        • S/P cholecystectomy    • Small bowel obstruction     01-   • Splenomegaly    • VRE (vancomycin-resistant Enterococci)     02-17-17, pt declines knowledge of this       Surgical History  Past Surgical History:   Procedure Laterality Date   • COLONOSCOPY N/A 3/27/2017    Procedure: COLONOSCOPY;  Surgeon: Osman Matt M.D.;  Location: SURGERY SAME DAY [facility];  Service:    • GASTROSCOPY N/A 3/27/2017    Procedure: GASTROSCOPY;  Surgeon: Osman Matt M.D.;  Location: SURGERY SAME DAY [facility];  Service:    • BREAST BIOPSY Left 2/21/2017    Procedure: BREAST BIOPSY - WIRE LOCALIZED EXCISONAL ;  Surgeon: Jane Zhao M.D.;  Location: SURGERY SAME DAY [facility];  Service:    • LUNG BIOPSY OPEN  11/2016   • OTHER ABDOMINAL SURGERY      Gasric Sleeve   • BONE MARROW ASPIRATION  3/16/2015    Performed by Freddie Hi M.D. at ENDOSCOPY [facility]   • BONE MARROW BIOPSY, NDL/TROCAR  3/16/2015    Performed by Freddie Hi M.D. at ENDOSCOPY [facility]   • RECOVERY  3/31/2014    Performed by [facility] at [facility]   • RECOVERY  3/24/2014    Performed by Cath-Recovery " Surgery at SURGERY SAME DAY [facility]   • RECOVERY  1/21/2014    Performed by [facility]Recovery Surgery at SURGERY SAME DAY [facility]   • BRONCHOSCOPY-ENDO  11/15/2013    Performed by Ha Perez M.D. at ENDOSCOPY [facility]   • RECOVERY  2/27/2013    Performed by Ir[facility]Recovery Surgery at SURGERY SAME DAY [facility]   • BONE MARROW ASPIRATION  12/31/2012    Performed by Josemanuel Real M.D. at ENDOSCOPY [facility]   • BONE MARROW BIOPSY, NDL/TROCAR  12/31/2012    Performed by Josemanuel Real M.D. at ENDOSCOPY [facility]   • GASTROSCOPY  9/28/2012    Performed by Aravind Richards M.D. at SURGERY [facility]   • SIGMOIDOSCOPY FLEX  9/26/2012    Performed by Kristopher Cardoso M.D. at ENDOSCOPY [facility]   • BRONCHOSCOPY-ENDO  6/19/2012    Performed by MARLENA MURILLO at ENDOSCOPY [facility]   • BRONCHOSCOPY-ENDO  5/29/2012    Performed by SUYAPA CAMARENA at ENDOSCOPY [facility]   • OTHER ABDOMINAL SURGERY  December 2011    [place] [facility] [place]   • GASTROSCOPY-ENDO  3/12/2010    Performed by CAMELIA SAMANO at ENDOSCOPY [facility]   • EXAM UNDER ANESTHESIA  11/11/2009    Performed by BIRD ABDI at SURGERY [facility]   • HEMORRHOIDECTOMY  11/11/2009    Performed by BIRD ABDI at SURGERY [facility]   • KELBY BY LAPAROSCOPY  9/19/2009    Performed by BIRD ABDI at SURGERY [facility]   • CARPAL TUNNEL RELEASE          • HYSTERECTOMY, TOTAL ABDOMINAL          • OTHER      Breast reduction   • PB ANESTH,NOSE,SINUS SURGERY     • TONSILLECTOMY         Medications  No current facility-administered medications on file prior to encounter.      Current Outpatient Prescriptions on File Prior to Encounter   Medication Sig Dispense Refill   • Naloxegol Oxalate 25 MG Tab Take 25 mg by mouth every day. 30 Tab 2   • alprazolam (XANAX) 0.5 MG Tab Take 1 Tab by mouth 3 times a day. 90 Tab 2   • oxycodone immediate release (ROXICODONE) 10  MG immediate release tablet Take 1-3 Tabs by mouth every 6 hours as needed for Moderate Pain. 90 Tab 0   • tacrolimus (PROGRAF) 0.5 MG Cap Take 6 Caps by mouth every day. Take with 1 mg = 1.5 mg BID 6 Cap 2   • predniSONE (DELTASONE) 1 MG Tab Take 7 Tabs by mouth every day. 7 Tab 0   • nystatin (MYCOSTATIN) powder Apply 1 Application to affected area(s) 2 times a day as needed.     • Wheat Dextrin (BENEFIBER) Powder Take 3.8 g by mouth every day.     • polyethylene glycol 3350 (MIRALAX) Powder Take 1 g by mouth every day.     • levothyroxine (SYNTHROID) 137 MCG Tab Take 137 mcg by mouth Every morning on an empty stomach.     • ambrisentan (LETAIRIS) 10 MG tablet Take 1 Tab by mouth every day. 30 Tab 11   • pravastatin (PRAVACHOL) 20 MG Tab TAKE 1 TAB BY MOUTH EVERY DAY. 30 Tab 11   • fluticasone (FLONASE) 50 MCG/ACT nasal spray SPRAY 1 SPRAY IN EACH NOSTRIL TWICE A DAY 48 g 1   • trazodone (DESYREL) 50 MG Tab TAKE 1-2 TABS BY MOUTH EVERY BEDTIME. 90 Tab 2   • Tadalafil, PAH, (ADCIRCA) 20 MG Tab Take 40 mg by mouth every bedtime. Indications: Pulmonary Arterial Hypertension 180 Tab 3   • morphine (MS IR) 15 MG tablet Take 1 Tab by mouth every four hours as needed for Severe Pain. 30 Tab 0   • furosemide (LASIX) 40 MG Tab TAKE 1 TAB BY MOUTH EVERY DAY. 30 Tab 6   • cyclobenzaprine (FLEXERIL) 10 MG Tab Take 10 mg by mouth 3 times a day as needed for Muscle Spasms.     • ferrous sulfate (FEOSOL) 220 (44 FE) MG/5ML Elixir Take 5 mL by mouth every day. 1 Bottle 10   • Linaclotide 290 MCG Cap Take 290 mg by mouth every day. 90 Cap 3   • CITRACAL MAXIMUM 315-250 MG-UNIT Tab TAKE 2 TABS BY MOUTH 2X/ DAY WITH FOOD BEFORE AND AFTER DINNER FOR LIFE-CRUSH 1ST 3 MONTH, SPACE  Tab 11   • ascorbic acid (ASCORBIC ACID) 500 MG Tab Take 500 mg by mouth every day.     • sennosides (SENOKOT) 8.6 MG Tab Take 17.2 mg by mouth 2 times a day.     • Probiotic Product (PROBIOTIC DAILY PO) Take 1 Cap by mouth every day.     • aspirin EC  (ECOTRIN) 81 MG Tablet Delayed Response Take 1 Tab by mouth every morning. 90 Tab 4   • gabapentin (NEURONTIN) 600 MG tablet Take 1 Tab by mouth 3 times a day. 270 Tab 4   • omeprazole (PRILOSEC) 40 MG delayed-release capsule Take 1 Cap by mouth every day. 90 Cap 4   • ondansetron (ZOFRAN ODT) 4 MG TABLET DISPERSIBLE Take 1 Tab by mouth every 8 hours as needed for Nausea/Vomiting. Nausea and vomiting 270 Tab 4   • tiotropium (SPIRIVA) 18 MCG Cap Inhale 1 Cap by mouth every day. 90 Cap 4   • sertraline (ZOLOFT) 100 MG Tab Take 100 mg by mouth every bedtime.     • docusate sodium (COLACE) 100 MG Cap Take 100 mg by mouth 2 times a day.     • mycophenolate (CELLCEPT) 250 MG Cap Take 500 mg by mouth 2 times a day.         Family History  Family History   Problem Relation Age of Onset   • Other Father      Unknown (dead 10 years)   • Diabetes Father    • Heart Disease Father    • Hypertension Father    • Hyperlipidemia Father    • Stroke Father    • Non-contributory Mother    • Hyperlipidemia Mother    • Alcohol/Drug Mother    • Cancer Paternal Aunt    • Alcohol/Drug Maternal Grandmother    • Alcohol/Drug Maternal Grandfather        Social History  Social History   Substance Use Topics   • Smoking status: Passive Smoke Exposure - Never Smoker   • Smokeless tobacco: Never Used      Comment: avoid all tobacco products   • Alcohol use No      Comment: 2009 quit drinking       Allergies  Allergies   Allergen Reactions   • Rhubarb Anaphylaxis   • Organic Nitrates      Nitroglycerin products should be avoided with the use of PDE-5 inhibitors as the combination can result in severe hypotension.   • Vancomycin Hcl      Causes red man syndrome when infused to fast          Physical Exam  Laboratory   Hemodynamics  Temp (24hrs), Av.6 °C (101.4 °F), Min:38.6 °C (101.4 °F), Max:38.6 °C (101.4 °F)   Temperature: (!) 38.6 °C (101.4 °F)  Pulse  Av.9  Min: 77  Max: 92 Heart Rate (Monitored): 85  Blood Pressure: (!) 70/34,  NIBP: (!) 92/55      Respiratory      Respiration: 20, Pulse Oximetry: 94 %             Physical Exam   Constitutional: She is oriented to person, place, and time. No distress.   HENT:   Head: Normocephalic and atraumatic.   Right Ear: External ear normal.   Left Ear: External ear normal.   Eyes: EOM are normal. Right eye exhibits no discharge. Left eye exhibits no discharge.   Neck: Neck supple. No JVD present.   Cardiovascular: Normal rate, regular rhythm and normal heart sounds.    Pulmonary/Chest: Effort normal and breath sounds normal. No respiratory distress. She exhibits no tenderness.   Abdominal: Soft. Bowel sounds are normal. She exhibits no distension. There is no tenderness.   Bilateral ANGIE drains, serosanguineous fluid. Wound dressings are clean and dry.   Musculoskeletal: Normal range of motion. She exhibits no edema.   Neurological: She is alert and oriented to person, place, and time. No cranial nerve deficit.   Skin: Skin is warm and dry. She is not diaphoretic. No erythema.   Psychiatric: She has a normal mood and affect. Her behavior is normal.   Nursing note and vitals reviewed.      Recent Labs      10/27/17   1036   WBC  5.1   RBC  3.41*   HEMOGLOBIN  9.9*   HEMATOCRIT  31.0*   MCV  90.9   MCH  29.0   MCHC  31.9*   RDW  46.1   PLATELETCT  57*   MPV  8.8*     Recent Labs      10/27/17   1036   SODIUM  139   POTASSIUM  3.7   CHLORIDE  108   CO2  23   GLUCOSE  132*   BUN  10   CREATININE  0.97   CALCIUM  8.9     Recent Labs      10/27/17   1036   ALTSGPT  8   ASTSGOT  16   ALKPHOSPHAT  21*   TBILIRUBIN  0.6   GLUCOSE  132*     Recent Labs      10/27/17   1036   APTT  25.1   INR  1.07     Recent Labs      10/27/17   1036   BNPBTYPENAT  70         Lab Results   Component Value Date    TROPONINI <0.01 10/27/2017       Imaging  Ct-abdomen-pelvis With    Result Date: 10/27/2017  10/27/2017 12:28 PM HISTORY/REASON FOR EXAM:  Pain. Recent abdominal surgery, chronic pannus infection. TECHNIQUE/EXAM  DESCRIPTION:   CT scan of the abdomen and pelvis with contrast. Contrast-enhanced helical scanning was obtained from the diaphragmatic domes through the pubic symphysis following the bolus administration of nonionic contrast without complication. 100 mL of Omnipaque 350 nonionic contrast was administered without complication. Low dose optimization technique was utilized for this CT exam including automated exposure control and adjustment of the mA and/or kV according to patient size. COMPARISON: 6/22/2017 FINDINGS: CT Abdomen: Visualized lung bases show bilateral atelectasis, worse on the LEFT. Low-density splenic lesion again noted, likely cyst. The liver is unremarkable. Postoperative change from prior gastric surgery, likely gastric sleeve procedure. Adrenal glands are unremarkable. The kidneys are unremarkable. Pancreas is atrophic. Gallbladder is absent. CT Pelvis: Bladder is unremarkable. Increased colonic fluid and stool. Appendix has a normal appearance. No peritoneal fluid or pneumoperitoneum. Hyperdense fluid collection within the lower abdominal wall, somewhat eccentric to the LEFT, measuring 4 x 17 x 4.8 cm.  Associated surgical drains present bilaterally.  Multiple surgical clips noted.  Diffuse edema present within the lateral wall.  Minimal gas bubbles also noted. Mild atherosclerotic change abdominal aorta. Lumbar spine degenerative changes.     1.  Postoperative change of the lower abdominal wall with large hyperdense fluid collection consistent with hematoma.  Surgical drains are present.  No evidence to indicate ongoing arterial hemorrhage. 2.  No intra-abdominal inflammatory changes, peritoneal fluid or pneumothorax. 3.  Mild bibasilar atelectasis. 4.  Increased colonic fluid and stool.    Dx-chest-limited (1 View)    Result Date: 10/3/2017  10/3/2017 2:28 PM HISTORY/REASON FOR EXAM:  Cough. Shortness of breath. TECHNIQUE/EXAM DESCRIPTION AND NUMBER OF VIEWS: Single AP view of the chest.  COMPARISON: None FINDINGS: Lines/tubes:  None. Lungs: There is slightly increased interstitial opacity in each lower lobe which represent minimal interstitial edema or pneumonia. No lobar consolidation is present. Pleura:  There is no pneumothorax. A small left pleural effusion is present. Heart and mediastinum:  The heart silhouette is within normal limits. Bones:  There is sigmoid-shaped scoliosis of the thoracic spine with the primary curve convex right inferiorly.     Minimal bibasilar interstitial edema or pneumonia with small left pleural effusion.    Dx-chest-portable (1 View)    Result Date: 10/27/2017  10/27/2017 11:35 AM HISTORY/REASON FOR EXAM:  Sepsis. TECHNIQUE/EXAM DESCRIPTION AND NUMBER OF VIEWS: Single portable view of the chest. COMPARISON: 1 view chest 10/3/2017 FINDINGS: The lungs show linear density that is consistent with atelectasis. Cardiac silhouette: normal in size. Pleura: There is probably a tiny left pleural effusion. Osseous structures: No significant bony abnormality is present.     No significant change     Assessment/Plan     Anticipate that patient will needGreater than 2 midnights for management of the discussed medical issues.    This dictation was created using voice recognition software. The accuracy of the dictation is limited to the abilities of the software. I expect there may be some errors of grammar and possibly content.    * Sepsis (CMS-HCC)- (present on admission)   Assessment & Plan    She is tachycardic, and hypotensive. She does not have leukocytosis or lactic acidosis however she is immunocompromised and can likely not mount an appropriate immune response.  Patient carries a history of cirrhosis status post liver transplant on chronic immunosuppressive therapy. Her systolic blood pressure was initially in the 70s therefore she'll be admitted to the ICU overnight for observation on goal-directed therapy with IV fluid resuscitation. I will start her on empiric  antibiotic therapies for sepsis of unknown etiology at this point. We will monitor her blood cultures and urinalysis. CT of the abdomen is negative and shows only changes appropriate in the postoperative period. Chest x-ray is negative for consolidation. We will monitor her wound as another potential source of infection.        Hypotension- (present on admission)   Assessment & Plan    Secondary to sepsis, treatment as noted above.        Chronic use of opiate drugs therapeutic purposes- (present on admission)   Assessment & Plan    Because of her hypotension, I will hold off on her when necessary narcotics but I will continue her long-acting MS Contin.        Immunocompromised state (CMS-HCC)- (present on admission)   Assessment & Plan    Patient is status post liver transplantation approximately 5 years ago for history of cirrhosis. I will continue her on her home Prograf, prednisone, and CellCept. However if she has any worsening in her underlying infection, we'll consider holding these medications.        Pulmonary hypertension- (present on admission)   Assessment & Plan    This is chronic, continue home inhalers and tadalafil, respiratory therapy as needed. If blood pressures remain marginal, will stop tadalafil. I will hold her Lasix and Cozaar in light of her hypotension.        Hypothyroidism, adult- (present on admission)   Assessment & Plan    This is chronic and stable, continue home Synthroid.          Prophylaxis:Sequential compression devices for DVT prophylaxis, no PPI indicated, stool softeners ordered     Patient is critically ill, greater than 36 minutes of critical care time were spent in the admission, planning, and management of this patient not including procedures without overlap.

## 2017-10-27 NOTE — PROGRESS NOTES
"Pharmacy Kinetics 57 y.o. female on vancomycin day # 1 10/27/2017    Currently on Vancomycin new start    Indication for Treatment: unknown source of sepsis    Pertinent history per medical record: Admitted on 10/27/2017 for hypotension and fever.  Patient has a significant past medical history, including pulmonary arterial hypertension and liver transplant on immunosuppressive agents. She was recently admitted to a hospital in [place] where she underwent a panniculectomy for infected pannus on 10/25, drains still in place.  She became increasingly febrile at home, and was found to be significantly hypotensive in the ED. Antibiotics initiated for unknown source of sepsis.     Other antibiotics: Zosyn 4.5 g IV q6h    Allergies: Rhubarb; Organic nitrates; and Vancomycin hcl     List concerns for renal function: history liver transplant, nephrotoxic agents (tacrolimus), hypotension     Pertinent cultures to date:   10/27 PBC x 2: in process    Recent Labs      10/27/17   1036   WBC  5.1   NEUTSPOLYS  76.10*     Recent Labs      10/27/17   1036   BUN  10   CREATININE  0.97   ALBUMIN  3.5     Blood pressure (!) 70/34, pulse 84, temperature 37.2 °C (99 °F), resp. rate 19, height 1.727 m (5' 8\"), weight 78.5 kg (173 lb), last menstrual period 2000, SpO2 94 %. Temp (24hrs), Av.9 °C (100.2 °F), Min:37.2 °C (99 °F), Max:38.6 °C (101.4 °F)      A/P   1. Vancomycin dose change: initiate 15 mg/kg q12h  2. Next vancomycin level: prior to 4th total dose (not yet ordered)  3. Goal trough: 16-20 mcg/mL  4. Comments: New start vancomycin for unknown source of sepsis. Hypotension appears to be improving with fluid boluses, lactic acid is normal. Renal function appears to be at baseline, but at higher risk for toxicity given concurrent use with tacrolimus. Patient was previously given doses of 20 mg/kg q12h and trough level resulted high at that time, but level was drawn ~3 hours before due time. Will start with 15 " mg/kg q12h for now and plan on a level when closer to steady state. Will continue to follow.    Adeola Aguilar, Rosa IselaD

## 2017-10-27 NOTE — ED NOTES
The Medication Reconciliation process has been completed by interviewing the patient,  and their list.  They have both the Letariris and the Adcirca available for use while IP status.     Allergies have been reviewed  Antibiotic use in 30 days - Started on 7 day course of Septra and Keflex yesterday.    Home Pharmacy:  [facility]

## 2017-10-27 NOTE — ED NOTES
TERESO NUNEZ from home, had surgery on wed. In  [place] for chronic infection of pannus, came home same day, last evening temp 104.0, this morning felt weak, checked her pulse ox 84% on RA, temp 101.8, bp on arrival 70/34, hr 93, [id number], sepsis score of 5, dry intact dressing tpo olvin lira lower ANGIE'sa Dr Ortiz to bedside, US guided IV placed in L upper arm, initila labs and #1 BC drawn, fluid bolus started

## 2017-10-27 NOTE — ASSESSMENT & PLAN NOTE
Patient is status post liver transplantation 5 years ago for history of cirrhosis.   Continue her on her home Prograf, prednisone, and CellCept.  Cortisol was low at 1. Stress-dose steroids were given initially with IV hydrocortisone then tapering prednisone.

## 2017-10-27 NOTE — ASSESSMENT & PLAN NOTE
Upon admission, she was tachycardic and hypotensive with BP 70's/30's. She does not have leukocytosis or lactic acidosis however she is immunocompromised and can likely not mount an appropriate immune response. She is on goal-directed therapy with IV fluid resuscitation.  Empiric antibiotic therapies for sepsis in the setting of recent surgery.   CT of the abdomen is negative and shows only changes appropriate in the postoperative period. Chest x-ray is negative for consolidation. We will monitor her wound as another potential source of infection.  [facility] Infectious Disease consultation.

## 2017-10-27 NOTE — ASSESSMENT & PLAN NOTE
Continue her oxycodone 10 mg TID  IV morphine for abd pain s/p surgery  Likely home tomorrow with a prescription of oral narcotics due to post-op pain.

## 2017-10-27 NOTE — ED PROVIDER NOTES
ED Provider Note    Scribed for Ramses Ortiz M.D. by Mary Ann Galindo. 10/27/2017, 10:32 AM.    Primary care provider: Bernarda Damon D.O.  Means of arrival: EMS  History obtained from: patient  History limited by: none    CHIEF COMPLAINT  Shortness of breath     HPI  Roxana Cuba is a 57 y.o. Female with a history of chronic bronchitis who presents to the Emergency Department by EMS for fever, abdominal pain, back pain, and shortness of breath onset last night.  The patient had a paniculectomy on 10/25 in [place] and states that she developed her fever after surgery.  The patient states that she has chronic back pain but she notes that this back pain is different and much more severe, 8/10 severity. Her back pain is exacerbated with movement.  She describes the pain as sharp and stabbing.  She notes that her abdominal pain is localized to her incision site and dull and achy. The patient notes that her temperature was 104 last night, she spoke to her surgeon in  and he advised she present to the ED when her temperature reaches 106.  Per EMS, when they picked her up the patient's O2 was 84% on 4L of nasal canula oxygen, her BP was 85/40. She denies any sore throat, cough, congestion, diarrhea, or vomiting. She notes experiencing some dysuria.  The patient states that she is typically chronically constipated and has not had a bowel movement in 6 days.  The patient had a liver transplant 5.5 years ago and denies any past complications with anesthesia.     REVIEW OF SYSTEMS  Review of Systems   Constitutional: Positive for fever.   HENT: Negative for congestion and sore throat.    Respiratory: Positive for shortness of breath. Negative for cough.    Gastrointestinal: Positive for abdominal pain and constipation. Negative for diarrhea and vomiting.   Genitourinary: Positive for dysuria.   Musculoskeletal: Positive for back pain.   All other systems reviewed and are negative.  C    PAST  MEDICAL HISTORY   has a past medical history of Breath shortness; Bronchitis ( ); Cardiomegaly; Chronic fatigue and malaise; Cirrhosis (CMS-HCC) (December 2011); CKD (chronic kidney disease) stage 3, GFR 30-59 ml/min; Diabetes (CMS-HCC); Fracture of left foot; GERD (gastroesophageal reflux disease); H/O Clostridium difficile infection (02-17-17); Hypothyroid; Low back pain (02-17-17); Mild aortic regurgitation and aortic valve sclerosis ( ); On home oxygen therapy; Platelet disorder (CMS-HCC); Pneumonia; Psychiatric disorder; Pulmonary hypertension; S/P cholecystectomy; Small bowel obstruction; Splenomegaly; and VRE (vancomycin-resistant Enterococci).    SURGICAL HISTORY   has a past surgical history that includes anesth,nose,sinus surgery; makayla by laparoscopy (9/19/2009); exam under anesthesia (11/11/2009); hysterectomy, total abdominal; other; gastroscopy-endo (3/12/2010); bronchoscopy-endo (5/29/2012); bronchoscopy-endo (6/19/2012); sigmoidoscopy flex (9/26/2012); recovery (2/27/2013); bronchoscopy-endo (11/15/2013); recovery (1/21/2014); recovery (3/24/2014); hemorrhoidectomy (11/11/2009); gastroscopy (9/28/2012); carpal tunnel release; bone marrow aspiration (12/31/2012); bone marrow biopsy, ndl/trocar (12/31/2012); recovery (3/31/2014); other abdominal surgery (December 2011); bone marrow aspiration (3/16/2015); bone marrow biopsy, ndl/trocar (3/16/2015); lung biopsy open (11/2016); tonsillectomy; other abdominal surgery (); breast biopsy (Left, 2/21/2017); colonoscopy (N/A, 3/27/2017); and gastroscopy (N/A, 3/27/2017).    SOCIAL HISTORY  Social History   Substance Use Topics   • Smoking status: Passive Smoke Exposure - Never Smoker   • Smokeless tobacco: Never Used      Comment: avoid all tobacco products   • Alcohol use No      Comment: 05/2009 quit drinking      History   Drug Use No       FAMILY HISTORY  Family History   Problem Relation Age of Onset   • Other Father      Unknown (dead 10 years)    • Diabetes Father    • Heart Disease Father    • Hypertension Father    • Hyperlipidemia Father    • Stroke Father    • Non-contributory Mother    • Hyperlipidemia Mother    • Alcohol/Drug Mother    • Cancer Paternal Aunt    • Alcohol/Drug Maternal Grandmother    • Alcohol/Drug Maternal Grandfather        CURRENT MEDICATIONS  No current facility-administered medications on file prior to encounter.      Current Outpatient Prescriptions on File Prior to Encounter   Medication Sig Dispense Refill   • Naloxegol Oxalate 25 MG Tab Take 25 mg by mouth every day. 30 Tab 2   • methylnaltrexone (RELISTOR) 12 MG/0.6ML Solution Inject 0.6 mL as instructed 1 time daily as needed for up to 30 days. 18 mL 2   • alprazolam (XANAX) 0.5 MG Tab Take 1 Tab by mouth 3 times a day. 90 Tab 2   • oxycodone immediate release (ROXICODONE) 10 MG immediate release tablet Take 1-3 Tabs by mouth every 6 hours as needed for Moderate Pain. 90 Tab 0   • tacrolimus (PROGRAF) 0.5 MG Cap Take 6 Caps by mouth every day. Take with 1 mg = 1.5 mg BID 6 Cap 2   • predniSONE (DELTASONE) 1 MG Tab Take 7 Tabs by mouth every day. 7 Tab 0   • nystatin (MYCOSTATIN) powder Apply 1 Application to affected area(s) 2 times a day as needed.     • Wheat Dextrin (BENEFIBER) Powder Take 3.8 g by mouth every day.     • polyethylene glycol 3350 (MIRALAX) Powder Take 1 g by mouth every day.     • levothyroxine (SYNTHROID) 137 MCG Tab Take 137 mcg by mouth Every morning on an empty stomach.     • ambrisentan (LETAIRIS) 10 MG tablet Take 1 Tab by mouth every day. 30 Tab 11   • pravastatin (PRAVACHOL) 20 MG Tab TAKE 1 TAB BY MOUTH EVERY DAY. 30 Tab 11   • fluticasone (FLONASE) 50 MCG/ACT nasal spray SPRAY 1 SPRAY IN EACH NOSTRIL TWICE A DAY 48 g 1   • trazodone (DESYREL) 50 MG Tab TAKE 1-2 TABS BY MOUTH EVERY BEDTIME. 90 Tab 2   • Tadalafil, PAH, (ADCIRCA) 20 MG Tab Take 40 mg by mouth every bedtime. Indications: Pulmonary Arterial Hypertension 180 Tab 3   • morphine (MS  IR) 15 MG tablet Take 1 Tab by mouth every four hours as needed for Severe Pain. 30 Tab 0   • furosemide (LASIX) 40 MG Tab TAKE 1 TAB BY MOUTH EVERY DAY. 30 Tab 6   • cyclobenzaprine (FLEXERIL) 10 MG Tab Take 10 mg by mouth 3 times a day as needed for Muscle Spasms.     • ferrous sulfate (FEOSOL) 220 (44 FE) MG/5ML Elixir Take 5 mL by mouth every day. 1 Bottle 10   • Linaclotide 290 MCG Cap Take 290 mg by mouth every day. 90 Cap 3   • CITRACAL MAXIMUM 315-250 MG-UNIT Tab TAKE 2 TABS BY MOUTH 2X/ DAY WITH FOOD BEFORE AND AFTER DINNER FOR LIFE-CRUSH 1ST 3 MONTH, SPACE  Tab 11   • ascorbic acid (ASCORBIC ACID) 500 MG Tab Take 500 mg by mouth every day.     • sennosides (SENOKOT) 8.6 MG Tab Take 17.2 mg by mouth 2 times a day.     • Probiotic Product (PROBIOTIC DAILY PO) Take 1 Cap by mouth every day.     • aspirin EC (ECOTRIN) 81 MG Tablet Delayed Response Take 1 Tab by mouth every morning. 90 Tab 4   • gabapentin (NEURONTIN) 600 MG tablet Take 1 Tab by mouth 3 times a day. 270 Tab 4   • omeprazole (PRILOSEC) 40 MG delayed-release capsule Take 1 Cap by mouth every day. 90 Cap 4   • ondansetron (ZOFRAN ODT) 4 MG TABLET DISPERSIBLE Take 1 Tab by mouth every 8 hours as needed for Nausea/Vomiting. Nausea and vomiting 270 Tab 4   • tiotropium (SPIRIVA) 18 MCG Cap Inhale 1 Cap by mouth every day. 90 Cap 4   • sertraline (ZOLOFT) 100 MG Tab Take 100 mg by mouth every bedtime.     • docusate sodium (COLACE) 100 MG Cap Take 100 mg by mouth every day.     • mycophenolate (CELLCEPT) 250 MG Cap Take 500 mg by mouth 2 times a day.       ALLERGIES  Allergies   Allergen Reactions   • Rhubarb Anaphylaxis   • Organic Nitrates      Nitroglycerin products should be avoided with the use of PDE-5 inhibitors as the combination can result in severe hypotension.   • Vancomycin Hcl      Causes red man syndrome when infused to fast       PHYSICAL EXAM  VITAL SIGNS: BP (!) 70/34   Pulse 92   Temp (!) 38.6 °C (101.4 °F)   Resp 16   Ht  "1.727 m (5' 8\")   Wt 78.5 kg (173 lb)   LMP 01/03/2000   SpO2 91%   BMI 26.30 kg/m²   Vitals reviewed.  Constitutional:  Chronically ill appearing   HENT: Normocephalic, Atraumatic, Bilateral external ears normal, dry mucous membranes , No oral exudates, Nose normal.   Eyes: PERRL, EOMI, Conjunctiva normal, No discharge.   Neck: Normal range of motion, No tenderness, Supple, No stridor.   Cardiovascular: Normal heart rate, Normal rhythm, No murmurs, No rubs, No gallops.   Thorax & Lungs: Normal breath sounds, No respiratory distress, No wheezing, No chest tenderness.   Abdomen: Bowel sounds normal, Soft, No tenderness, covered incision site on lower abdomen.  This is removed, down to the Steri-Strips over her surgical incision.  This is not red and hot and slightly tender.  Skin: Warm, Dry, No erythema, No rash.   Back: No tenderness, No CVA tenderness.  No skin breakdown or focal tenderness or midline tenderness.  Musculoskeletal: Good range of motion in all major joints. No asymmetric edema. No tenderness to palpation or major deformities noted.  The pulses.  Neurologic: Alert, Normal motor function, Normal sensory function, No focal deficits noted.   Psychiatric: Affect anxious    LABS  Results for orders placed or performed during the hospital encounter of 10/27/17   LACTIC ACID   Result Value Ref Range    Lactic Acid 0.7 0.5 - 2.0 mmol/L   LACTIC ACID   Result Value Ref Range    Lactic Acid 0.5 0.5 - 2.0 mmol/L   CBC WITH DIFFERENTIAL   Result Value Ref Range    WBC 5.1 4.8 - 10.8 K/uL    RBC 3.41 (L) 4.20 - 5.40 M/uL    Hemoglobin 9.9 (L) 12.0 - 16.0 g/dL    Hematocrit 31.0 (L) 37.0 - 47.0 %    MCV 90.9 81.4 - 97.8 fL    MCH 29.0 27.0 - 33.0 pg    MCHC 31.9 (L) 33.6 - 35.0 g/dL    RDW 46.1 35.9 - 50.0 fL    Platelet Count 57 (L) 164 - 446 K/uL    MPV 8.8 (L) 9.0 - 12.9 fL    Neutrophils-Polys 76.10 (H) 44.00 - 72.00 %    Lymphocytes 12.60 (L) 22.00 - 41.00 %    Monocytes 6.00 0.00 - 13.40 %    Eosinophils " 4.50 0.00 - 6.90 %    Basophils 0.40 0.00 - 1.80 %    Immature Granulocytes 0.40 0.00 - 0.90 %    Nucleated RBC 0.00 /100 WBC    Neutrophils (Absolute) 3.91 2.00 - 7.15 K/uL    Lymphs (Absolute) 0.65 (L) 1.00 - 4.80 K/uL    Monos (Absolute) 0.31 0.00 - 0.85 K/uL    Eos (Absolute) 0.23 0.00 - 0.51 K/uL    Baso (Absolute) 0.02 0.00 - 0.12 K/uL    Immature Granulocytes (abs) 0.02 0.00 - 0.11 K/uL    NRBC (Absolute) 0.00 K/uL   COMP METABOLIC PANEL   Result Value Ref Range    Sodium 139 135 - 145 mmol/L    Potassium 3.7 3.6 - 5.5 mmol/L    Chloride 108 96 - 112 mmol/L    Co2 23 20 - 33 mmol/L    Anion Gap 8.0 0.0 - 11.9    Glucose 132 (H) 65 - 99 mg/dL    Bun 10 8 - 22 mg/dL    Creatinine 0.97 0.50 - 1.40 mg/dL    Calcium 8.9 8.5 - 10.5 mg/dL    AST(SGOT) 16 12 - 45 U/L    ALT(SGPT) 8 2 - 50 U/L    Alkaline Phosphatase 21 (L) 30 - 99 U/L    Total Bilirubin 0.6 0.1 - 1.5 mg/dL    Albumin 3.5 3.2 - 4.9 g/dL    Total Protein 5.4 (L) 6.0 - 8.2 g/dL    Globulin 1.9 1.9 - 3.5 g/dL    A-G Ratio 1.8 g/dL   URINALYSIS   Result Value Ref Range    Color Yellow     Character Clear     Specific Gravity 1.017 <1.035    Ph 6.0 5.0 - 8.0    Glucose Negative Negative mg/dL    Ketones Negative Negative mg/dL    Protein Negative Negative mg/dL    Bilirubin Negative Negative    Urobilinogen, Urine 0.2 Negative    Nitrite Negative Negative    Leukocyte Esterase Negative Negative    Occult Blood Negative Negative    Micro Urine Req see below    APTT   Result Value Ref Range    APTT 25.1 24.7 - 36.0 sec   PROTHROMBIN TIME   Result Value Ref Range    PT 13.6 12.0 - 14.6 sec    INR 1.07 0.87 - 1.13   BTYPE NATRIURETIC PEPTIDE   Result Value Ref Range    B Natriuretic Peptide 70 0 - 100 pg/mL   TROPONIN   Result Value Ref Range    Troponin I <0.01 0.00 - 0.04 ng/mL   ESTIMATED GFR   Result Value Ref Range    GFR If African American >60 >60 mL/min/1.73 m 2    GFR If Non African American 59 (A) >60 mL/min/1.73 m 2     *Note: Due to a large number  of results and/or encounters for the requested time period, some results have not been displayed. A complete set of results can be found in Results Review.   All labs reviewed by me.    RADIOLOGY  DX-CHEST-PORTABLE (1 VIEW)   Final Result      No significant change      CT-ABDOMEN-PELVIS WITH    (Results Pending)     The radiologist's interpretation of all radiological studies have been reviewed by me.    COURSE & MEDICAL DECISION MAKING  Pertinent Labs & Imaging studies reviewed. (See chart for details)    Obtained and reviewed past medical records.  As reviewed from previous admission.    10:32 AM Patient seen and examined at bedside. The patient presents with fever, shortness of breath, abdominal pain, and back pain, and the differential diagnosis includes but is not limited to sepsis, pneumonia, UTI. Ordered for chest xray, lactic acid, APTT, PTT, BNP, troponin, lactic acid, CBC with differential, CMP,urinalysis, urine culture, blood culture, estimated GFR to evaluate. Patient will be treated with Zosyn, Vancomycin and NS infusion 1000mL for dehydration. I attempted to place the patient's IV with ultrasound at bedside.     Patient is started on a fluid bolus and antibiotics per the sepsis protocol.    11:30 AM Rechecked the patient and discussed the patient's lab results with her and her . I explained that the patient will be admitted for further care and monitoring. Ordered T abdomen pelvis.  She verbalized understanding.     12:27 PM Spoke with Dr. Oleary, Hospitalist, about the patient's condition. She will admit the patient, care is transferred at this time.     3:11 PM Rechecked the patient, she appears more hydrated and her BP is improved.       The patient claims a fever and weakness, she is hypotensive and dehydrated.  The patient's chest x-ray was read as no significant change.  Prior x-ray was read as effusion and possible pneumonia.  The patient is treated with appropriate antibiotics for this  including possible associated infections.  Urinalysis is negative.  A CT was obtained to see his abdominal pain and back pain.  She has a postoperative fluid collection, which may be expected, she has drains in place.  Her incision is hot and slightly tender.  She is only about 48 hours out from the procedure seems a little bit quick for her to have sepsis related to this.  Also, she states she had a fever before the surgery.    The patient is at risk for, she is slightly hypoxic more than her baseline, but she is also hypoventilating.  This might be considered as an inpatient, but I think this does not explain her fever and hypotension.  She does not have an elevated troponin or BNP.  She does not have chest pain.  Her surgery was done out of town.  He may need to see the surgeon here.    Addendum: She does have back pain, she may need an MRI to rule out epidural abscess because of her fever risk factor is immunocompromised.  At this point she is neurologically intact.  I think with her infiltrate identified on her chest x-ray, possibly identified on the chest CT I think she needs to be treated have a pneumonia and sepsis.  She is admitted to hospitalist in guarded condition.    CRITICAL CARE  The very real possibilty of a deterioration of this patient's condition required the highest level of my preparedness for sudden, emergent intervention.  I provided critical care services, which included medication orders, frequent reevaluations of the patient's condition and response to treatment, ordering and reviewing test results, and discussing the case with various consultants.  The critical care time associated with the care of the patient was 40 minutes. Review chart for interventions. This time is exclusive of any other billable procedures.     DISPOSITION:  Patient will be admitted to Dr. Oleary in critical condition.    FINAL IMPRESSION  1. Sepsis, due to unspecified organism (CMS-[facility])    2. Hypotension, unspecified  hypotension type    3. Chronic respiratory failure with hypoxia (CMS-HCC)     4.  Postop day 2 status post panniculectomy.  5.  Chronic immunosuppression from liver transplant  6.  Critical care time of 40 minutes, not seizures     I, Mary Ann Galindo (Scribe), am scribing for, and in the presence of, Ramses Ortiz M.D..    Electronically signed by: Mary Ann Galindo (Scribe), 10/27/2017    IRamses M.D. personally performed the services described in this documentation, as scribed by Mary Ann Galindo in my presence, and it is both accurate and complete.    The note accurately reflects work and decisions made by me.  Ramses Ortiz  10/27/2017  6:10 PM

## 2017-10-28 PROBLEM — A41.9 SEPSIS (HCC): Status: RESOLVED | Noted: 2017-10-27 | Resolved: 2017-10-28

## 2017-10-28 PROBLEM — A41.9 SEPTIC SHOCK (HCC): Status: ACTIVE | Noted: 2017-10-28

## 2017-10-28 PROBLEM — R65.21 SEPTIC SHOCK (HCC): Status: ACTIVE | Noted: 2017-10-28

## 2017-10-28 LAB
ALBUMIN SERPL BCP-MCNC: 2.8 G/DL (ref 3.2–4.9)
ALBUMIN/GLOB SERPL: 1.5 G/DL
ALP SERPL-CCNC: 14 U/L (ref 30–99)
ALT SERPL-CCNC: 7 U/L (ref 2–50)
ANION GAP SERPL CALC-SCNC: 6 MMOL/L (ref 0–11.9)
AST SERPL-CCNC: 18 U/L (ref 12–45)
BASOPHILS # BLD AUTO: 0.2 % (ref 0–1.8)
BASOPHILS # BLD: 0.01 K/UL (ref 0–0.12)
BILIRUB SERPL-MCNC: 0.4 MG/DL (ref 0.1–1.5)
BUN SERPL-MCNC: 8 MG/DL (ref 8–22)
CALCIUM SERPL-MCNC: 7.5 MG/DL (ref 8.5–10.5)
CHLORIDE SERPL-SCNC: 112 MMOL/L (ref 96–112)
CO2 SERPL-SCNC: 23 MMOL/L (ref 20–33)
CORTIS SERPL-MCNC: 1.2 UG/DL (ref 0–23)
CREAT SERPL-MCNC: 0.78 MG/DL (ref 0.5–1.4)
EOSINOPHIL # BLD AUTO: 0.08 K/UL (ref 0–0.51)
EOSINOPHIL NFR BLD: 1.8 % (ref 0–6.9)
ERYTHROCYTE [DISTWIDTH] IN BLOOD BY AUTOMATED COUNT: 47.1 FL (ref 35.9–50)
GFR SERPL CREATININE-BSD FRML MDRD: >60 ML/MIN/1.73 M 2
GLOBULIN SER CALC-MCNC: 1.9 G/DL (ref 1.9–3.5)
GLUCOSE BLD-MCNC: 152 MG/DL (ref 65–99)
GLUCOSE BLD-MCNC: 186 MG/DL (ref 65–99)
GLUCOSE BLD-MCNC: 192 MG/DL (ref 65–99)
GLUCOSE BLD-MCNC: 228 MG/DL (ref 65–99)
GLUCOSE SERPL-MCNC: 143 MG/DL (ref 65–99)
HCT VFR BLD AUTO: 26.3 % (ref 37–47)
HGB BLD-MCNC: 8.5 G/DL (ref 12–16)
IMM GRANULOCYTES # BLD AUTO: 0.03 K/UL (ref 0–0.11)
IMM GRANULOCYTES NFR BLD AUTO: 0.7 % (ref 0–0.9)
LYMPHOCYTES # BLD AUTO: 0.38 K/UL (ref 1–4.8)
LYMPHOCYTES NFR BLD: 8.7 % (ref 22–41)
MCH RBC QN AUTO: 30.1 PG (ref 27–33)
MCHC RBC AUTO-ENTMCNC: 32.3 G/DL (ref 33.6–35)
MCV RBC AUTO: 93.3 FL (ref 81.4–97.8)
MONOCYTES # BLD AUTO: 0.16 K/UL (ref 0–0.85)
MONOCYTES NFR BLD AUTO: 3.7 % (ref 0–13.4)
NEUTROPHILS # BLD AUTO: 3.72 K/UL (ref 2–7.15)
NEUTROPHILS NFR BLD: 84.9 % (ref 44–72)
NRBC # BLD AUTO: 0 K/UL
NRBC BLD AUTO-RTO: 0 /100 WBC
PLATELET # BLD AUTO: 54 K/UL (ref 164–446)
PMV BLD AUTO: 10.3 FL (ref 9–12.9)
POTASSIUM SERPL-SCNC: 4.1 MMOL/L (ref 3.6–5.5)
PROT SERPL-MCNC: 4.7 G/DL (ref 6–8.2)
RBC # BLD AUTO: 2.82 M/UL (ref 4.2–5.4)
SODIUM SERPL-SCNC: 141 MMOL/L (ref 135–145)
WBC # BLD AUTO: 4.4 K/UL (ref 4.8–10.8)

## 2017-10-28 PROCEDURE — 700102 HCHG RX REV CODE 250 W/ 637 OVERRIDE(OP): Performed by: HOSPITALIST

## 2017-10-28 PROCEDURE — 700111 HCHG RX REV CODE 636 W/ 250 OVERRIDE (IP): Performed by: HOSPITALIST

## 2017-10-28 PROCEDURE — 700111 HCHG RX REV CODE 636 W/ 250 OVERRIDE (IP)

## 2017-10-28 PROCEDURE — 85025 COMPLETE CBC W/AUTO DIFF WBC: CPT

## 2017-10-28 PROCEDURE — A9270 NON-COVERED ITEM OR SERVICE: HCPCS | Performed by: HOSPITALIST

## 2017-10-28 PROCEDURE — 80053 COMPREHEN METABOLIC PANEL: CPT

## 2017-10-28 PROCEDURE — 700105 HCHG RX REV CODE 258

## 2017-10-28 PROCEDURE — 82962 GLUCOSE BLOOD TEST: CPT | Mod: 91

## 2017-10-28 PROCEDURE — 770022 HCHG ROOM/CARE - ICU (200)

## 2017-10-28 PROCEDURE — 99291 CRITICAL CARE FIRST HOUR: CPT | Performed by: HOSPITALIST

## 2017-10-28 RX ORDER — OXYCODONE HYDROCHLORIDE 10 MG/1
10 TABLET ORAL
Status: DISCONTINUED | OUTPATIENT
Start: 2017-10-28 | End: 2017-10-28

## 2017-10-28 RX ORDER — OXYCODONE HYDROCHLORIDE 5 MG/1
5 TABLET ORAL EVERY 4 HOURS PRN
Status: DISCONTINUED | OUTPATIENT
Start: 2017-10-28 | End: 2017-10-31 | Stop reason: HOSPADM

## 2017-10-28 RX ORDER — OXYCODONE HYDROCHLORIDE 10 MG/1
10 TABLET ORAL EVERY 4 HOURS PRN
Status: DISCONTINUED | OUTPATIENT
Start: 2017-10-28 | End: 2017-10-31 | Stop reason: HOSPADM

## 2017-10-28 RX ORDER — SERTRALINE HYDROCHLORIDE 100 MG/1
100 TABLET, FILM COATED ORAL
Status: DISCONTINUED | OUTPATIENT
Start: 2017-10-28 | End: 2017-10-31 | Stop reason: HOSPADM

## 2017-10-28 RX ORDER — ALPRAZOLAM 0.5 MG/1
0.5 TABLET ORAL 3 TIMES DAILY
Status: DISCONTINUED | OUTPATIENT
Start: 2017-10-28 | End: 2017-10-31 | Stop reason: HOSPADM

## 2017-10-28 RX ORDER — FLUTICASONE PROPIONATE 50 MCG
2 SPRAY, SUSPENSION (ML) NASAL 2 TIMES DAILY
Status: DISCONTINUED | OUTPATIENT
Start: 2017-10-28 | End: 2017-10-31 | Stop reason: HOSPADM

## 2017-10-28 RX ORDER — HYDROMORPHONE HYDROCHLORIDE 2 MG/ML
.5-1 INJECTION, SOLUTION INTRAMUSCULAR; INTRAVENOUS; SUBCUTANEOUS
Status: DISCONTINUED | OUTPATIENT
Start: 2017-10-28 | End: 2017-10-28

## 2017-10-28 RX ORDER — MORPHINE SULFATE 4 MG/ML
1-4 INJECTION, SOLUTION INTRAMUSCULAR; INTRAVENOUS
Status: DISCONTINUED | OUTPATIENT
Start: 2017-10-28 | End: 2017-10-31 | Stop reason: HOSPADM

## 2017-10-28 RX ORDER — TRAZODONE HYDROCHLORIDE 50 MG/1
50 TABLET ORAL
Status: DISCONTINUED | OUTPATIENT
Start: 2017-10-28 | End: 2017-10-29

## 2017-10-28 RX ORDER — PREDNISONE 5 MG/1
10 TABLET ORAL 2 TIMES DAILY
Status: DISCONTINUED | OUTPATIENT
Start: 2017-10-28 | End: 2017-10-30

## 2017-10-28 RX ADMIN — INSULIN LISPRO 1 UNITS: 100 INJECTION, SOLUTION INTRAVENOUS; SUBCUTANEOUS at 07:00

## 2017-10-28 RX ADMIN — TAZOBACTAM SODIUM AND PIPERACILLIN SODIUM 4.5 G: 500; 4 INJECTION, SOLUTION INTRAVENOUS at 08:50

## 2017-10-28 RX ADMIN — VANCOMYCIN HYDROCHLORIDE 1200 MG: 100 INJECTION, POWDER, LYOPHILIZED, FOR SOLUTION INTRAVENOUS at 00:38

## 2017-10-28 RX ADMIN — TACROLIMUS 1.5 MG: 0.5 CAPSULE ORAL at 21:17

## 2017-10-28 RX ADMIN — TAZOBACTAM SODIUM AND PIPERACILLIN SODIUM 4.5 G: 500; 4 INJECTION, SOLUTION INTRAVENOUS at 03:49

## 2017-10-28 RX ADMIN — HYDROCORTISONE SODIUM SUCCINATE 100 MG: 100 INJECTION, POWDER, FOR SOLUTION INTRAMUSCULAR; INTRAVENOUS at 00:56

## 2017-10-28 RX ADMIN — AMBRISENTAN 10 MG: 10 TABLET, FILM COATED ORAL at 11:32

## 2017-10-28 RX ADMIN — TIOTROPIUM BROMIDE 1 CAPSULE: 18 CAPSULE ORAL; RESPIRATORY (INHALATION) at 08:48

## 2017-10-28 RX ADMIN — MORPHINE SULFATE 4 MG: 4 INJECTION INTRAVENOUS at 23:12

## 2017-10-28 RX ADMIN — INSULIN LISPRO 2 UNITS: 100 INJECTION, SOLUTION INTRAVENOUS; SUBCUTANEOUS at 11:59

## 2017-10-28 RX ADMIN — ONDANSETRON 4 MG: 2 INJECTION INTRAMUSCULAR; INTRAVENOUS at 06:03

## 2017-10-28 RX ADMIN — MORPHINE SULFATE 4 MG: 4 INJECTION INTRAVENOUS at 21:16

## 2017-10-28 RX ADMIN — FLUTICASONE PROPIONATE 100 MCG: 50 SPRAY, METERED NASAL at 21:57

## 2017-10-28 RX ADMIN — GABAPENTIN 600 MG: 300 CAPSULE ORAL at 08:49

## 2017-10-28 RX ADMIN — ONDANSETRON 4 MG: 2 INJECTION INTRAMUSCULAR; INTRAVENOUS at 17:21

## 2017-10-28 RX ADMIN — ALPRAZOLAM 0.5 MG: 0.5 TABLET ORAL at 21:18

## 2017-10-28 RX ADMIN — OXYCODONE HYDROCHLORIDE 30 MG: 30 TABLET ORAL at 02:37

## 2017-10-28 RX ADMIN — GABAPENTIN 600 MG: 300 CAPSULE ORAL at 15:02

## 2017-10-28 RX ADMIN — TRAZODONE HYDROCHLORIDE 50 MG: 50 TABLET ORAL at 23:12

## 2017-10-28 RX ADMIN — GABAPENTIN 600 MG: 300 CAPSULE ORAL at 21:18

## 2017-10-28 RX ADMIN — SERTRALINE 100 MG: 100 TABLET, FILM COATED ORAL at 21:17

## 2017-10-28 RX ADMIN — OMEPRAZOLE 40 MG: 20 CAPSULE, DELAYED RELEASE ORAL at 08:48

## 2017-10-28 RX ADMIN — HYDROCORTISONE SODIUM SUCCINATE 100 MG: 100 INJECTION, POWDER, FOR SOLUTION INTRAMUSCULAR; INTRAVENOUS at 08:49

## 2017-10-28 RX ADMIN — MORPHINE SULFATE 2 MG: 4 INJECTION INTRAVENOUS at 15:03

## 2017-10-28 RX ADMIN — MORPHINE SULFATE 4 MG: 4 INJECTION INTRAVENOUS at 17:11

## 2017-10-28 RX ADMIN — TACROLIMUS 1.5 MG: 0.5 CAPSULE ORAL at 08:48

## 2017-10-28 RX ADMIN — VANCOMYCIN HYDROCHLORIDE 1200 MG: 100 INJECTION, POWDER, LYOPHILIZED, FOR SOLUTION INTRAVENOUS at 12:42

## 2017-10-28 RX ADMIN — STANDARDIZED SENNA CONCENTRATE AND DOCUSATE SODIUM 2 TABLET: 8.6; 5 TABLET, FILM COATED ORAL at 21:17

## 2017-10-28 RX ADMIN — PREDNISONE 10 MG: 5 TABLET ORAL at 21:18

## 2017-10-28 RX ADMIN — BISACODYL 10 MG: 10 SUPPOSITORY RECTAL at 17:27

## 2017-10-28 RX ADMIN — STANDARDIZED SENNA CONCENTRATE AND DOCUSATE SODIUM 2 TABLET: 8.6; 5 TABLET, FILM COATED ORAL at 08:49

## 2017-10-28 RX ADMIN — ALPRAZOLAM 0.5 MG: 0.5 TABLET ORAL at 15:02

## 2017-10-28 RX ADMIN — TAZOBACTAM SODIUM AND PIPERACILLIN SODIUM 4.5 G: 500; 4 INJECTION, SOLUTION INTRAVENOUS at 15:03

## 2017-10-28 RX ADMIN — INSULIN LISPRO 1 UNITS: 100 INJECTION, SOLUTION INTRAVENOUS; SUBCUTANEOUS at 21:15

## 2017-10-28 RX ADMIN — TADALAFIL 40 MG: 20 TABLET ORAL at 21:00

## 2017-10-28 RX ADMIN — MORPHINE SULFATE 15 MG: 30 TABLET ORAL at 05:58

## 2017-10-28 RX ADMIN — TAZOBACTAM SODIUM AND PIPERACILLIN SODIUM 4.5 G: 500; 4 INJECTION, SOLUTION INTRAVENOUS at 21:16

## 2017-10-28 RX ADMIN — MYCOPHENOLATE MOFETIL 500 MG: 250 CAPSULE ORAL at 08:48

## 2017-10-28 RX ADMIN — LEVOTHYROXINE SODIUM 137 MCG: 137 TABLET ORAL at 06:03

## 2017-10-28 RX ADMIN — MORPHINE SULFATE 4 MG: 4 INJECTION INTRAVENOUS at 19:14

## 2017-10-28 RX ADMIN — MYCOPHENOLATE MOFETIL 500 MG: 250 CAPSULE ORAL at 21:18

## 2017-10-28 RX ADMIN — INSULIN LISPRO 1 UNITS: 100 INJECTION, SOLUTION INTRAVENOUS; SUBCUTANEOUS at 17:24

## 2017-10-28 RX ADMIN — PRAVASTATIN SODIUM 20 MG: 20 TABLET ORAL at 08:49

## 2017-10-28 RX ADMIN — OXYCODONE HYDROCHLORIDE 10 MG: 10 TABLET ORAL at 15:03

## 2017-10-28 ASSESSMENT — COPD QUESTIONNAIRES
HAVE YOU SMOKED AT LEAST 100 CIGARETTES IN YOUR ENTIRE LIFE: NO/DON'T KNOW
DURING THE PAST 4 WEEKS HOW MUCH DID YOU FEEL SHORT OF BREATH: NONE/LITTLE OF THE TIME
COPD SCREENING SCORE: 1
DURING THE PAST 4 WEEKS HOW MUCH DID YOU FEEL SHORT OF BREATH: NONE/LITTLE OF THE TIME
DO YOU EVER COUGH UP ANY MUCUS OR PHLEGM?: NO/ONLY WITH OCCASIONAL COLDS OR INFECTIONS
COPD SCREENING SCORE: 1
DO YOU EVER COUGH UP ANY MUCUS OR PHLEGM?: NO/ONLY WITH OCCASIONAL COLDS OR INFECTIONS
HAVE YOU SMOKED AT LEAST 100 CIGARETTES IN YOUR ENTIRE LIFE: NO/DON'T KNOW

## 2017-10-28 ASSESSMENT — PAIN SCALES - GENERAL
PAINLEVEL_OUTOF10: 7
PAINLEVEL_OUTOF10: 8
PAINLEVEL_OUTOF10: 4
PAINLEVEL_OUTOF10: 9
PAINLEVEL_OUTOF10: 4
PAINLEVEL_OUTOF10: 5
PAINLEVEL_OUTOF10: 5
PAINLEVEL_OUTOF10: 7
PAINLEVEL_OUTOF10: 3
PAINLEVEL_OUTOF10: 4
PAINLEVEL_OUTOF10: 4
PAINLEVEL_OUTOF10: 8
PAINLEVEL_OUTOF10: 4
PAINLEVEL_OUTOF10: 9
PAINLEVEL_OUTOF10: 7

## 2017-10-28 ASSESSMENT — LIFESTYLE VARIABLES
DO YOU DRINK ALCOHOL: NO
DO YOU DRINK ALCOHOL: NO

## 2017-10-28 ASSESSMENT — PATIENT HEALTH QUESTIONNAIRE - PHQ9
1. LITTLE INTEREST OR PLEASURE IN DOING THINGS: NOT AT ALL
SUM OF ALL RESPONSES TO PHQ QUESTIONS 1-9: 0
SUM OF ALL RESPONSES TO PHQ QUESTIONS 1-9: 0
SUM OF ALL RESPONSES TO PHQ9 QUESTIONS 1 AND 2: 0
1. LITTLE INTEREST OR PLEASURE IN DOING THINGS: NOT AT ALL
2. FEELING DOWN, DEPRESSED, IRRITABLE, OR HOPELESS: NOT AT ALL
SUM OF ALL RESPONSES TO PHQ9 QUESTIONS 1 AND 2: 0
2. FEELING DOWN, DEPRESSED, IRRITABLE, OR HOPELESS: NOT AT ALL

## 2017-10-28 ASSESSMENT — ENCOUNTER SYMPTOMS
CHILLS: 0
FEVER: 1
NERVOUS/ANXIOUS: 1
ABDOMINAL PAIN: 1

## 2017-10-28 NOTE — ASSESSMENT & PLAN NOTE
Upon admission, she was tachycardic and hypotensive with BP 70's/30's.   She is immunocompromised and may not be able to mount an appropriate immune response.   She has received goal-directed therapy with IV fluid resuscitation.  Empiric antibiotic therapies for sepsis in the setting of recent surgery.  Chest x-ray is negative for consolidation.   Her wound does not appear infected and was examined today. Drains will come out in PCP office on Friday.  [facility] Infectious Disease consulted: outpt abx for 7 days upon d/c.  CT abd:  1.  Postoperative change of the lower abdominal wall with large hyperdense fluid collection consistent with hematoma.  Surgical drains are present.  No evidence to indicate ongoing arterial hemorrhage.  2.  No intra-abdominal inflammatory changes, peritoneal fluid or pneumothorax.  3.  Mild bibasilar atelectasis.  4.  Increased colonic fluid and stool.

## 2017-10-28 NOTE — PROGRESS NOTES
"Pharmacy Kinetics 57 y.o. female on vancomycin day # 2 10/28/2017    Currently on Vancomycin 1200 mg iv q12hr (0100/1300)  Other antibiotics: Zosyn 4.5g IV q6h    Indication for Treatment: Empiric - sepsis     Pertinent history per medical record: Admitted on 10/27/2017 for hypotension and fever.  Patient has a significant past medical history, including pulmonary arterial hypertension and liver transplant on immunosuppressive agents. She was recently admitted to a hospital in [place] where she underwent a panniculectomy for infected pannus on 10/25, drains still in place.  She became increasingly febrile at home, and was found to be significantly hypotensive in the ED. Antibiotics initiated for unknown source of sepsis.     Allergies: Rhubarb; Organic nitrates; and Vancomycin hcl     List concerns for renal function: history liver transplant, nephrotoxic agents (tacrolimus), hypotension (resolved)     Pertinent cultures to date:   10/28/17 - Peripheral BC x 2: NGTD    Recent Labs      10/27/17   1036  10/28/17   0400   WBC  5.1  4.4*   NEUTSPOLYS  76.10*  84.90*     Recent Labs      10/27/17   1036  10/28/17   0400   BUN  10  8   CREATININE  0.97  0.78   ALBUMIN  3.5  2.8*     No results for input(s): VANCOTROUGH, VANCOPEAK, VANCORANDOM in the last 72 hours.  Intake/Output Summary (Last 24 hours) at 10/28/17 1124  Last data filed at 10/28/17 0800   Gross per 24 hour   Intake             2860 ml   Output              840 ml   Net             2020 ml      Blood pressure (!) 70/34, pulse 75, temperature 36.8 °C (98.2 °F), resp. rate 16, height 1.727 m (5' 8\"), weight 83.2 kg (183 lb 6.8 oz), last menstrual period 2000, SpO2 93 %, not currently breastfeeding. Temp (24hrs), Av.1 °C (98.8 °F), Min:36.8 °C (98.2 °F), Max:37.9 °C (100.2 °F)      A/P   1. Vancomycin dose change: continue current dosing   2. Next vancomycin level: 10/29 at 1230 (ordered)   3. Goal trough: 16-20 mcg/mL    4. Comments: Patient " in the ICU today - hypotension has resolved after fluid administration.  Renal indices are stable.  Cultures are NGTD.  Continue with current dosing and pharmacist to follow-up with steady state level ordered tomorrow and adjust dose as indicated.     Mecca Adkins, PharmD, BCPS

## 2017-10-28 NOTE — RESPIRATORY CARE
COPD EDUCATION by COPD CLINICAL EDUCATOR  10/28/2017 at 8:48 AM by Mesha Miller     Patient reviewed by COPD education team. Patient does not qualify for COPD program at this time

## 2017-10-28 NOTE — CARE PLAN
Problem: Skin Integrity  Goal: Skin Integrity is maintained or improved  Outcome: PROGRESSING AS EXPECTED  Pt will maintain or improve skin integrity, including but not limited to surgical incision prior to hospitalization.

## 2017-10-28 NOTE — CARE PLAN
Problem: Pain  Goal: Alleviation of Pain or a reduction in pain to the patient's comfort goal  Outcome: PROGRESSING AS EXPECTED  Monitor pain status. Educate patient on pain scale. Educate patient on parameters for administering IV pain medications.

## 2017-10-28 NOTE — PROGRESS NOTES
Spoke with patient's spouse over the phone regarding care of patient. Spouse irate d/t lack of communication between physician team here at [facility]. Spouse stated that he was called by the patient's surgeon in [place] to ask for update. Spouse gave phone number to  in the ED and told her to call the surgeon and keep him in the loop. Spouse states that he feels like there is no communication here and that no one follows through with what they say they will do nor are competent to care for his spouse. De-escalated situation with spouse and let him know that I would speak with charge nurse and apprise mgt of the situation and have the medical care team contact the surgeon first thing in the morning. Spouse not happy but pacified and will follow up when he arrives in the morning.

## 2017-10-28 NOTE — PROGRESS NOTES
Talked to  regarding soft blood pressure. Orders received for 500 mL bolus NS and STAT cortisol level. Upon receipt of low cortisol level, orders received for hydrocortisone 100 mg q8 hrs. BP has increased to 90-100s SBP. HR remains steady at 70-80s NSR.

## 2017-10-28 NOTE — PROGRESS NOTES
[facility] Hospitalist Progress Note    Date of Service: 10/28/2017    Chief Complaint  57 y.o. female admitted 10/27/2017 with fever.    Interval Problem Update  Ms. Cuba has a hx of sarcoidosis and liver transplant on chronic immunosuppressants that underwent a panniculectomy in [place] on 10/25. She presented with fever 102 and hypotension and has been admitted to the ICU with sepsis.   She notes significant pain.  She is tolerating a diet.  I called her surgeon, Dr. Acuna at [phone] and we discussed her condition.   Consultants/Specialty  Pulmonology  [facility] Infectious Disease    Disposition  ICU        Review of Systems   Constitutional: Positive for fever. Negative for chills.   Cardiovascular: Negative for chest pain and leg swelling.   Gastrointestinal: Positive for abdominal pain.   Psychiatric/Behavioral: The patient is nervous/anxious.    All other systems reviewed and are negative.     Physical Exam  Laboratory/Imaging   Hemodynamics  Temp (24hrs), Av.4 °C (99.3 °F), Min:36.9 °C (98.4 °F), Max:38.6 °C (101.4 °F)   Temperature: 37.1 °C (98.7 °F)  Pulse  Av.4  Min: 67  Max: 93 Heart Rate (Monitored): 77  Blood Pressure: (!) 70/34, NIBP: (!) 96/45      Respiratory      Respiration: 12, Pulse Oximetry: 95 %, O2 Daily Delivery Respiratory : Silicone Nasal Cannula     Work Of Breathing / Effort: Mild;Shallow  RUL Breath Sounds: Diminished, RML Breath Sounds: Diminished, RLL Breath Sounds: Diminished, MANJINDER Breath Sounds: Diminished, LLL Breath Sounds: Diminished    Fluids    Intake/Output Summary (Last 24 hours) at 10/28/17 0708  Last data filed at 10/28/17 0400   Gross per 24 hour   Intake             2460 ml   Output              820 ml   Net             1640 ml       Nutrition  Orders Placed This Encounter   Procedures   • Diet Order     Standing Status:   Standing     Number of Occurrences:   1     Order Specific Question:   Diet:     Answer:   Diabetic [3]     Physical Exam    Constitutional: She is oriented to person, place, and time. She appears distressed.   Neck: Neck supple.   Cardiovascular: Normal rate and regular rhythm.    Pulmonary/Chest: She has no wheezes. She has rales.   Abdominal:   Midline incision with JPs   Musculoskeletal: She exhibits edema. She exhibits no tenderness.   Neurological: She is alert and oriented to person, place, and time.   Skin: Skin is dry. She is not diaphoretic. There is pallor.       Recent Labs      10/27/17   1036  10/28/17   0400   WBC  5.1  4.4*   RBC  3.41*  2.82*   HEMOGLOBIN  9.9*  8.5*   HEMATOCRIT  31.0*  26.3*   MCV  90.9  93.3   MCH  29.0  30.1   MCHC  31.9*  32.3*   RDW  46.1  47.1   PLATELETCT  57*  54*   MPV  8.8*  10.3     Recent Labs      10/27/17   1036  10/28/17   0400   SODIUM  139  141   POTASSIUM  3.7  4.1   CHLORIDE  108  112   CO2  23  23   GLUCOSE  132*  143*   BUN  10  8   CREATININE  0.97  0.78   CALCIUM  8.9  7.5*     Recent Labs      10/27/17   1036   APTT  25.1   INR  1.07     Recent Labs      10/27/17   1036   BNPBTYPENAT  70              Assessment/Plan     Septic shock (CMS-[facility])- (present on admission)   Assessment & Plan    Upon admission, she was tachycardic and hypotensive with BP 70's/30's. She does not have leukocytosis or lactic acidosis however she is immunocompromised and can likely not mount an appropriate immune response. She is on goal-directed therapy with IV fluid resuscitation.  Empiric antibiotic therapies for sepsis in the setting of recent surgery.  Chest x-ray is negative for consolidation. We will monitor her wound as another potential source of infection.  [facility] Infectious Disease consultation  CT abd:  1.  Postoperative change of the lower abdominal wall with large hyperdense fluid collection consistent with hematoma.  Surgical drains are present.  No evidence to indicate ongoing arterial hemorrhage.  2.  No intra-abdominal inflammatory changes, peritoneal fluid or pneumothorax.  3.  Mild  bibasilar atelectasis.  4.  Increased colonic fluid and stool.        Hypotension- (present on admission)   Assessment & Plan    Secondary to sepsis, treatment as noted above.        [place] (present on admission)   Assessment & Plan    Patient is status post liver transplantation 5 years ago for history of cirrhosis.   Continue her on her home Prograf, prednisone, and CellCept.  Cortisol is low at 1. Stress-dose steroids.        Chronic use of opiate drugs therapeutic purposes- (present on admission)   Assessment & Plan    Continue her oxycodone 10 mg TID        Thrombocytopenia (CMS-HCC)- (present on admission)   Assessment & Plan    Platelets are chronically low.  Currently platelet count of 54.        Pulmonary hypertension- (present on admission)   Assessment & Plan    This is chronic, continue home inhalers and tadalafil.  Possible pulmonology consult.        Sarcoidosis (CMS-HCC)- (present on admission)   Assessment & Plan    Hx of nodule in the spleen        Hypothyroidism, adult- (present on admission)   Assessment & Plan    Continue home Synthroid.          Quality-Core Measures    Pt is critically ill in the ICU, 34 minutes of critical care time spent with patient and nursing and in specific management of septic shock in the intensive care unit. See orders.

## 2017-10-28 NOTE — CARE PLAN
Problem: Knowledge Deficit  Goal: Knowledge of the prescribed therapeutic regimen will improve  Outcome: PROGRESSING AS EXPECTED

## 2017-10-28 NOTE — CARE PLAN
Problem: Knowledge Deficit  Goal: Maintain or improve baseline circulation, motion and sensation  Outcome: PROGRESSING AS EXPECTED  Patient will maintain and improve baseline understanding of effects of narcotics on hemodynamics.

## 2017-10-28 NOTE — PROGRESS NOTES
Dr. Oleary called per pt request for more pain medications for better pain control.  Order to add 2mg morphine IV PRN Q4H for severe pain.

## 2017-10-29 PROBLEM — A41.9 SEPSIS (HCC): Status: ACTIVE | Noted: 2017-10-29

## 2017-10-29 LAB
ALBUMIN SERPL BCP-MCNC: 3 G/DL (ref 3.2–4.9)
ALBUMIN/GLOB SERPL: 1.5 G/DL
ALP SERPL-CCNC: 19 U/L (ref 30–99)
ALT SERPL-CCNC: 7 U/L (ref 2–50)
ANION GAP SERPL CALC-SCNC: 6 MMOL/L (ref 0–11.9)
AST SERPL-CCNC: 12 U/L (ref 12–45)
BACTERIA UR CULT: NORMAL
BASOPHILS # BLD AUTO: 0 % (ref 0–1.8)
BASOPHILS # BLD: 0 K/UL (ref 0–0.12)
BILIRUB SERPL-MCNC: 0.3 MG/DL (ref 0.1–1.5)
BUN SERPL-MCNC: 10 MG/DL (ref 8–22)
CALCIUM SERPL-MCNC: 7.7 MG/DL (ref 8.5–10.5)
CHLORIDE SERPL-SCNC: 110 MMOL/L (ref 96–112)
CO2 SERPL-SCNC: 26 MMOL/L (ref 20–33)
CREAT SERPL-MCNC: 0.87 MG/DL (ref 0.5–1.4)
EOSINOPHIL # BLD AUTO: 0.05 K/UL (ref 0–0.51)
EOSINOPHIL NFR BLD: 1.7 % (ref 0–6.9)
ERYTHROCYTE [DISTWIDTH] IN BLOOD BY AUTOMATED COUNT: 44.8 FL (ref 35.9–50)
GFR SERPL CREATININE-BSD FRML MDRD: >60 ML/MIN/1.73 M 2
GLOBULIN SER CALC-MCNC: 2 G/DL (ref 1.9–3.5)
GLUCOSE BLD-MCNC: 153 MG/DL (ref 65–99)
GLUCOSE BLD-MCNC: 187 MG/DL (ref 65–99)
GLUCOSE SERPL-MCNC: 190 MG/DL (ref 65–99)
HCT VFR BLD AUTO: 26.2 % (ref 37–47)
HGB BLD-MCNC: 8.6 G/DL (ref 12–16)
IMM GRANULOCYTES # BLD AUTO: 0.01 K/UL (ref 0–0.11)
IMM GRANULOCYTES NFR BLD AUTO: 0.3 % (ref 0–0.9)
LYMPHOCYTES # BLD AUTO: 0.32 K/UL (ref 1–4.8)
LYMPHOCYTES NFR BLD: 11 % (ref 22–41)
MCH RBC QN AUTO: 29.4 PG (ref 27–33)
MCHC RBC AUTO-ENTMCNC: 32.8 G/DL (ref 33.6–35)
MCV RBC AUTO: 89.4 FL (ref 81.4–97.8)
MONOCYTES # BLD AUTO: 0.16 K/UL (ref 0–0.85)
MONOCYTES NFR BLD AUTO: 5.5 % (ref 0–13.4)
NEUTROPHILS # BLD AUTO: 2.37 K/UL (ref 2–7.15)
NEUTROPHILS NFR BLD: 81.5 % (ref 44–72)
NRBC # BLD AUTO: 0 K/UL
NRBC BLD AUTO-RTO: 0 /100 WBC
PLATELET # BLD AUTO: 56 K/UL (ref 164–446)
PMV BLD AUTO: 10 FL (ref 9–12.9)
POTASSIUM SERPL-SCNC: 3.9 MMOL/L (ref 3.6–5.5)
PROT SERPL-MCNC: 5 G/DL (ref 6–8.2)
RBC # BLD AUTO: 2.93 M/UL (ref 4.2–5.4)
SIGNIFICANT IND 70042: NORMAL
SITE SITE: NORMAL
SODIUM SERPL-SCNC: 142 MMOL/L (ref 135–145)
SOURCE SOURCE: NORMAL
WBC # BLD AUTO: 2.9 K/UL (ref 4.8–10.8)

## 2017-10-29 PROCEDURE — 700102 HCHG RX REV CODE 250 W/ 637 OVERRIDE(OP): Performed by: HOSPITALIST

## 2017-10-29 PROCEDURE — 700111 HCHG RX REV CODE 636 W/ 250 OVERRIDE (IP)

## 2017-10-29 PROCEDURE — 770020 HCHG ROOM/CARE - TELE (206)

## 2017-10-29 PROCEDURE — A9270 NON-COVERED ITEM OR SERVICE: HCPCS | Performed by: HOSPITALIST

## 2017-10-29 PROCEDURE — 99233 SBSQ HOSP IP/OBS HIGH 50: CPT | Performed by: HOSPITALIST

## 2017-10-29 PROCEDURE — 700105 HCHG RX REV CODE 258

## 2017-10-29 PROCEDURE — 82962 GLUCOSE BLOOD TEST: CPT | Mod: 91

## 2017-10-29 PROCEDURE — 700111 HCHG RX REV CODE 636 W/ 250 OVERRIDE (IP): Performed by: HOSPITALIST

## 2017-10-29 PROCEDURE — 80053 COMPREHEN METABOLIC PANEL: CPT

## 2017-10-29 PROCEDURE — 85025 COMPLETE CBC W/AUTO DIFF WBC: CPT

## 2017-10-29 RX ORDER — TRAZODONE HYDROCHLORIDE 100 MG/1
100 TABLET ORAL
Status: DISCONTINUED | OUTPATIENT
Start: 2017-10-29 | End: 2017-10-31 | Stop reason: HOSPADM

## 2017-10-29 RX ADMIN — PREDNISONE 10 MG: 5 TABLET ORAL at 21:26

## 2017-10-29 RX ADMIN — MORPHINE SULFATE 4 MG: 4 INJECTION INTRAVENOUS at 02:40

## 2017-10-29 RX ADMIN — TRAZODONE HYDROCHLORIDE 100 MG: 100 TABLET ORAL at 21:29

## 2017-10-29 RX ADMIN — LEVOTHYROXINE SODIUM 137 MCG: 137 TABLET ORAL at 06:39

## 2017-10-29 RX ADMIN — TAZOBACTAM SODIUM AND PIPERACILLIN SODIUM 4.5 G: 500; 4 INJECTION, SOLUTION INTRAVENOUS at 21:24

## 2017-10-29 RX ADMIN — MORPHINE SULFATE 4 MG: 4 INJECTION INTRAVENOUS at 13:00

## 2017-10-29 RX ADMIN — AMBRISENTAN 10 MG: 10 TABLET, FILM COATED ORAL at 12:44

## 2017-10-29 RX ADMIN — TADALAFIL 40 MG: 20 TABLET ORAL at 21:29

## 2017-10-29 RX ADMIN — MORPHINE SULFATE 4 MG: 4 INJECTION INTRAVENOUS at 06:39

## 2017-10-29 RX ADMIN — STANDARDIZED SENNA CONCENTRATE AND DOCUSATE SODIUM 2 TABLET: 8.6; 5 TABLET, FILM COATED ORAL at 21:26

## 2017-10-29 RX ADMIN — TACROLIMUS 1.5 MG: 0.5 CAPSULE ORAL at 08:04

## 2017-10-29 RX ADMIN — BISACODYL 10 MG: 10 SUPPOSITORY RECTAL at 08:01

## 2017-10-29 RX ADMIN — PREDNISONE 10 MG: 5 TABLET ORAL at 08:04

## 2017-10-29 RX ADMIN — OXYCODONE HYDROCHLORIDE 10 MG: 10 TABLET ORAL at 14:34

## 2017-10-29 RX ADMIN — GABAPENTIN 600 MG: 300 CAPSULE ORAL at 21:26

## 2017-10-29 RX ADMIN — MORPHINE SULFATE 4 MG: 4 INJECTION INTRAVENOUS at 09:00

## 2017-10-29 RX ADMIN — MORPHINE SULFATE 4 MG: 4 INJECTION INTRAVENOUS at 17:10

## 2017-10-29 RX ADMIN — FLUTICASONE PROPIONATE 100 MCG: 50 SPRAY, METERED NASAL at 21:28

## 2017-10-29 RX ADMIN — INSULIN LISPRO 1 UNITS: 100 INJECTION, SOLUTION INTRAVENOUS; SUBCUTANEOUS at 11:11

## 2017-10-29 RX ADMIN — MORPHINE SULFATE 4 MG: 4 INJECTION INTRAVENOUS at 11:00

## 2017-10-29 RX ADMIN — GABAPENTIN 600 MG: 300 CAPSULE ORAL at 08:03

## 2017-10-29 RX ADMIN — OXYCODONE HYDROCHLORIDE 10 MG: 10 TABLET ORAL at 08:14

## 2017-10-29 RX ADMIN — INSULIN LISPRO 1 UNITS: 100 INJECTION, SOLUTION INTRAVENOUS; SUBCUTANEOUS at 21:27

## 2017-10-29 RX ADMIN — TACROLIMUS 1.5 MG: 0.5 CAPSULE ORAL at 21:26

## 2017-10-29 RX ADMIN — MYCOPHENOLATE MOFETIL 500 MG: 250 CAPSULE ORAL at 21:26

## 2017-10-29 RX ADMIN — ALPRAZOLAM 0.5 MG: 0.5 TABLET ORAL at 21:29

## 2017-10-29 RX ADMIN — ALPRAZOLAM 0.5 MG: 0.5 TABLET ORAL at 14:34

## 2017-10-29 RX ADMIN — MORPHINE SULFATE 4 MG: 4 INJECTION INTRAVENOUS at 23:41

## 2017-10-29 RX ADMIN — PRAVASTATIN SODIUM 20 MG: 20 TABLET ORAL at 08:04

## 2017-10-29 RX ADMIN — ALPRAZOLAM 0.5 MG: 0.5 TABLET ORAL at 08:03

## 2017-10-29 RX ADMIN — MORPHINE SULFATE 4 MG: 4 INJECTION INTRAVENOUS at 15:05

## 2017-10-29 RX ADMIN — SERTRALINE 100 MG: 100 TABLET, FILM COATED ORAL at 21:27

## 2017-10-29 RX ADMIN — TIOTROPIUM BROMIDE 1 CAPSULE: 18 CAPSULE ORAL; RESPIRATORY (INHALATION) at 07:58

## 2017-10-29 RX ADMIN — STANDARDIZED SENNA CONCENTRATE AND DOCUSATE SODIUM 2 TABLET: 8.6; 5 TABLET, FILM COATED ORAL at 08:04

## 2017-10-29 RX ADMIN — METHYLNALTREXONE BROMIDE 12 MG: 12 INJECTION, SOLUTION SUBCUTANEOUS at 13:45

## 2017-10-29 RX ADMIN — ONDANSETRON 4 MG: 4 TABLET, ORALLY DISINTEGRATING ORAL at 09:59

## 2017-10-29 RX ADMIN — TAZOBACTAM SODIUM AND PIPERACILLIN SODIUM 4.5 G: 500; 4 INJECTION, SOLUTION INTRAVENOUS at 15:09

## 2017-10-29 RX ADMIN — TAZOBACTAM SODIUM AND PIPERACILLIN SODIUM 4.5 G: 500; 4 INJECTION, SOLUTION INTRAVENOUS at 03:58

## 2017-10-29 RX ADMIN — POLYETHYLENE GLYCOL 3350 1 PACKET: 17 POWDER, FOR SOLUTION ORAL at 08:02

## 2017-10-29 RX ADMIN — OMEPRAZOLE 40 MG: 20 CAPSULE, DELAYED RELEASE ORAL at 08:03

## 2017-10-29 RX ADMIN — MORPHINE SULFATE 4 MG: 4 INJECTION INTRAVENOUS at 21:29

## 2017-10-29 RX ADMIN — FLUTICASONE PROPIONATE 100 MCG: 50 SPRAY, METERED NASAL at 07:58

## 2017-10-29 RX ADMIN — ONDANSETRON 4 MG: 4 TABLET, ORALLY DISINTEGRATING ORAL at 15:09

## 2017-10-29 RX ADMIN — TAZOBACTAM SODIUM AND PIPERACILLIN SODIUM 4.5 G: 500; 4 INJECTION, SOLUTION INTRAVENOUS at 09:09

## 2017-10-29 RX ADMIN — GABAPENTIN 600 MG: 300 CAPSULE ORAL at 14:34

## 2017-10-29 RX ADMIN — MYCOPHENOLATE MOFETIL 500 MG: 250 CAPSULE ORAL at 08:04

## 2017-10-29 RX ADMIN — MORPHINE SULFATE 4 MG: 4 INJECTION INTRAVENOUS at 19:15

## 2017-10-29 RX ADMIN — VANCOMYCIN HYDROCHLORIDE 1200 MG: 100 INJECTION, POWDER, LYOPHILIZED, FOR SOLUTION INTRAVENOUS at 00:36

## 2017-10-29 RX ADMIN — INSULIN LISPRO 1 UNITS: 100 INJECTION, SOLUTION INTRAVENOUS; SUBCUTANEOUS at 06:40

## 2017-10-29 ASSESSMENT — PAIN SCALES - GENERAL
PAINLEVEL_OUTOF10: 7
PAINLEVEL_OUTOF10: 6
PAINLEVEL_OUTOF10: 8
PAINLEVEL_OUTOF10: 6
PAINLEVEL_OUTOF10: 4
PAINLEVEL_OUTOF10: 6
PAINLEVEL_OUTOF10: 8
PAINLEVEL_OUTOF10: 7
PAINLEVEL_OUTOF10: 7
PAINLEVEL_OUTOF10: 4
PAINLEVEL_OUTOF10: 6
PAINLEVEL_OUTOF10: 8
PAINLEVEL_OUTOF10: 7
PAINLEVEL_OUTOF10: 7
PAINLEVEL_OUTOF10: 8
PAINLEVEL_OUTOF10: 5
PAINLEVEL_OUTOF10: 8
PAINLEVEL_OUTOF10: 7
PAINLEVEL_OUTOF10: 4

## 2017-10-29 ASSESSMENT — PATIENT HEALTH QUESTIONNAIRE - PHQ9
2. FEELING DOWN, DEPRESSED, IRRITABLE, OR HOPELESS: NOT AT ALL
SUM OF ALL RESPONSES TO PHQ QUESTIONS 1-9: 0
SUM OF ALL RESPONSES TO PHQ9 QUESTIONS 1 AND 2: 0
1. LITTLE INTEREST OR PLEASURE IN DOING THINGS: NOT AT ALL

## 2017-10-29 ASSESSMENT — ENCOUNTER SYMPTOMS
SHORTNESS OF BREATH: 1
CONSTIPATION: 1
CHILLS: 0
NERVOUS/ANXIOUS: 1
ABDOMINAL PAIN: 1
FEVER: 0
COUGH: 0

## 2017-10-29 NOTE — PROGRESS NOTES
Pulmonary Critical Care Progress Note        DOS:  10/29/17    History of Present Illness:  The patient is a 57-year-old woman, who I have   seen in the past last in 2016 for acute-on-chronic hypoxemic respiratory   arrest with possible atypical infection/HCAP including possibility of fungal   infection.  She clinically improved and has been working on weight loss.  She   has LUCIUS and failed to ever tolerate CPAP, although did not give much of a   trial to CPAP at home unfortunately.  She was successful, though with dramatic   weight loss in the greater than 100-pound range per her history.  She   developed some loose pannus that was problematic and saw a surgeon in the [place] area and had a panniculectomy done 3 days ago.  She was sent home   from her perspective as a prematurely and has borderline blood pressures   postop.  She presented to the hospital and was admitted by Dr. Sanches yesterday   for what she was concerned for, which was sepsis.  She reported symptoms of   fever, chills, shortness of breath, and malaise.  She tells me she can always   tell when she is septic.  She has done a good job over the years with her   immunocompromised transplant made for her liver transplantation and has been   careful with infection control.  She, from a respiratory standpoint, is   actually doing okay.  She is not using respiratory med except for the oxygen   now.  She does have occasional cough and wheeze, but very rarely uses her   albuterol rescue inhaler.  Spiriva inhalation once a day may be helping her,   she is not sure.  She follows with Dr. Gaming in the [facility] office.    Her LUCIUS symptoms have improved with the weight loss.  She is up-to-date with   vaccines as clinically appropriate per her transplant physician she stated.    She is not on any therapy for fungal infection at this time.  Her symptoms   have improved since arrival and is receiving fluid boluses and being started   on antibiotics.   She denies any chest pain or hemoptysis today.  Oxygen   requirements are little up from her baseline, 4 liters at home.  She is on 5   or 6 liters here, but oxygen saturations are acceptable.  Blood pressure has   been borderline in the  range.  She is not on pressors intravenously at   the time I saw her.  Cultures are negative so far this admission.  She denies   significant drainage from her incision, but the left side gives her pain, and   it radiates around her back, it radiates down her groin.  No history of kidney   stones, and no dysuria or hematuria.  Did a cortisol level last night of 1.2   and is on chronic steroids.  Excess steroids do cause her to have significant   side effects.  She is placed on low-dose supplementation of her Deltasone   orally.  Her antirejection medications including CellCept, prednisone, and   tacrolimus have been continued.    ROS:    Respiratory: negative cough, negative hemoptysis, negative pleuritic chest pain, negative shortness of breath, negative sputum production and negative wheezing,   Cardiac: negative chest pain and negative palpitations,   GI: negative nausea, negative vomiting and negative abdominal pain.     Interval Events:  24 hour interval history reviewed   Reason for visit:  Acute on chronic RF, sepsis vs SIRS, hepatopulmonary syndrome, obstructive lung disease, sarcoidosis, LUCIUS    Following well  5 lpm - 99% - uses 5 lpm at home - a bit reluctant  IS 2000  ID to see today  Vanco/Zosyn  Glucose noted  CXR no film today    PFSH:  No change.    Respiratory:     Pulse Oximetry: 97 %   NC O2 5 lpm - home O2 5 lpm  ABD Drains:  Carlos Fox 1: 10 ml, Carlos Fox 2: 10 ml       Exam: unlabored respirations, no intercostal retractions or accessory muscle use    Diminished BS throughout - few coarse crackles L>R base - nom wheezes  ImagingCXR  I have personally reviewed the chest x-ray my impression is noted above and films are reviewed with Team on  rounds          Invalid input(s): [id number]    HemoDynamics:  Pulse: 63, Heart Rate (Monitored): 63  NIBP: (!) 98/49       Exam: regular rhythm (Sinus), no ectopy, no edema  Imaging: Available data reviewed  Recent Labs      10/27/17   1036   TROPONINI  <0.01   BNPBTYPENAT  70       Neuro:  GCS Total Jose Cruz Coma Score: 15       Exam: no focal deficits noted mental status intact  Imaging: Available data reviewed    Fluids:  Intake/Output       10/27/17 0700 - 10/28/17 0659 10/28/17 0700 - 10/29/17 0659 10/29/17 0700 - 10/30/17 0659      [id number] 8332-7684 Total 8055-7141 3081-5957 Total 4900-5290 7745-2006 Total       Intake    P.O.  200  60 260  800  500 1300  --  -- --    P.O. 200 60 260  -- -- --    I.V.  200  2200 2400  1020  110 1130  --  -- --    IV Piggyback Volume -- -- -- 520 110 630 -- -- --    IV Volume (NS) 200 1200 1400 500 -- 500 -- -- --    Bolus -- 1000 1000 -- -- -- -- -- --    Other  --  -- --  --  50 50  --  -- --    Medications (P.O./ Enteral Liquids) -- -- -- -- 50 50 -- -- --    Total Intake 400 2260 2660 9318 844 1798 -- -- --       Output    Urine  500  300 800  1550  200 1750  --  -- --    Number of Times Voided -- 3 x 3 x -- 2 x 2 x -- -- --    Number of Times Incontinent of Urine -- 2 x 2 x -- -- -- -- -- --    Void (ml) 500  200 1750 -- -- --    Drains  20  20 40  20  -- 20  --  -- --    Carlos Singhtt 2 10 10 20 10 -- 10 -- -- --    Carlos Fox 1 10 10 20 10 -- 10 -- -- --    Stool  --  -- --  --  -- --  --  -- --    Number of Times Stooled -- -- -- 1 x -- 1 x -- -- --    Total Output 520  200 1770 -- -- --       Net I/O     -120 1940 1820 250 460 710 -- -- --        Weight: 86.9 kg (191 lb 9.3 oz)  Recent Labs      10/27/17   1036  10/28/17   0400  10/29/17   0330   SODIUM  139  141  142   POTASSIUM  3.7  4.1  3.9   CHLORIDE  108  112  110   CO2  23  23  26   BUN  10  8  10   CREATININE  0.97  0.78  0.87   CALCIUM  8.9  7.5*  7.7*        GI/Nutrition:  Exam: normal active bowel sounds, diffusely tender, lower abdominal percussion tenderness  Imaging: Available data reviewed  taking PO  Liver Function  Recent Labs      10/27/17   1036  10/28/17   0400  10/29/17   0330   ALTSGPT  8  7  7   ASTSGOT  16  18  12   ALKPHOSPHAT  21*  14*  19*   TBILIRUBIN  0.6  0.4  0.3   GLUCOSE  132*  143*  190*       Heme:  Recent Labs      10/27/17   1036  10/28/17   0400  10/29/17   0330   RBC  3.41*  2.82*  2.93*   HEMOGLOBIN  9.9*  8.5*  8.6*   HEMATOCRIT  31.0*  26.3*  26.2*   PLATELETCT  57*  54*  56*   PROTHROMBTM  13.6   --    --    APTT  25.1   --    --    INR  1.07   --    --        Infectious Disease:  Temp  Av.8 °C (98.2 °F)  Min: 36.2 °C (97.2 °F)  Max: 37.2 °C (99 °F)  Micro: antibiotics reviewed and cultures pending  Recent Labs      10/27/17   1036  10/28/17   0400  10/29/17   0330   WBC  5.1  4.4*  2.9*   NEUTSPOLYS  76.10*  84.90*  81.50*   LYMPHOCYTES  12.60*  8.70*  11.00*   MONOCYTES  6.00  3.70  5.50   EOSINOPHILS  4.50  1.80  1.70   BASOPHILS  0.40  0.20  0.00   ASTSGOT  16  18  12   ALTSGPT  8  7  7   ALKPHOSPHAT  21*  14*  19*   TBILIRUBIN  0.6  0.4  0.3     Current Facility-Administered Medications   Medication Dose Frequency Provider Last Rate Last Dose   • predniSONE (DELTASONE) tablet 10 mg  10 mg BID Josemanuel Real M.D.   10 mg at 10/28/17 2118   • alprazolam (XANAX) tablet 0.5 mg  0.5 mg TID Josemanuel Real M.D.   0.5 mg at 10/28/17 2118   • fluticasone (FLONASE) nasal spray 100 mcg  2 Spray BID Josemanuel Real M.D.   100 mcg at 10/28/17 2157   • sertraline (ZOLOFT) tablet 100 mg  100 mg QHS Josemanuel Real M.D.   100 mg at 10/28/17 2117   • oxycodone immediate-release (ROXICODONE) tablet 5 mg  5 mg Q4HRS PRN Josemanuel Real M.D.        Or   • oxycodone immediate release (ROXICODONE) tablet 10 mg  10 mg Q4HRS PRN Josemanuel Real M.D.       • morphine (pf) 4 mg/ml injection 1-4 mg  1-4 mg Q2HRS PRN Josemanuel Real M.D.   4 mg at  10/29/17 0240   • trazodone (DESYREL) tablet 50 mg  50 mg QHS PRN Hector Álvarez M.D.   50 mg at 10/28/17 2312   • tiotropium (SPIRIVA) 18 MCG inhalation capsule 1 Cap  1 Cap DAILY Racquel Oleary M.D.   1 Cap at 10/28/17 0848   • Tadalafil (PAH) TABS 40 mg  40 mg QHS Racquel Oleary M.D.   40 mg at 10/28/17 2100   • tacrolimus (PROGRAF) capsule 1.5 mg  1.5 mg BID Racquel Oleary M.D.   1.5 mg at 10/28/17 2117   • pravastatin (PRAVACHOL) tablet 20 mg  20 mg DAILY Racquel Oleary M.D.   20 mg at 10/28/17 0849   • omeprazole (PRILOSEC) capsule 40 mg  40 mg DAILY Racquel Oleary M.D.   40 mg at 10/28/17 0848   • mycophenolate (CELLCEPT) capsule 500 mg  500 mg BID Racquel Oleary M.D.   500 mg at 10/28/17 2118   • levothyroxine (SYNTHROID) tablet 137 mcg  137 mcg AM ES Racquel Oleary M.D.   137 mcg at 10/28/17 0603   • gabapentin (NEURONTIN) capsule 600 mg  600 mg TID Racquel Oleary M.D.   600 mg at 10/28/17 2118   • cyclobenzaprine (FLEXERIL) tablet 10 mg  10 mg TID PRN Racquel Oleary M.D.       • NS infusion 2,355 mL  30 mL/kg Once PRN Racquel Oleary M.D.       • senna-docusate (PERICOLACE or SENOKOT S) 8.6-50 MG per tablet 2 Tab  2 Tab BID Racquel Oleary M.D.   2 Tab at 10/28/17 2117    And   • polyethylene glycol/lytes (MIRALAX) PACKET 1 Packet  1 Packet QDAY PRN Racquel Oleary M.D.   1 Packet at 10/27/17 2020    And   • magnesium hydroxide (MILK OF MAGNESIA) suspension 30 mL  30 mL QDAY PRN Racquel Oleary M.D.   Stopped at 10/28/17 0900    And   • bisacodyl (DULCOLAX) suppository 10 mg  10 mg QDAY PRN Racquel Oleary M.D.   10 mg at 10/28/17 1727   • Respiratory Care per Protocol   Continuous RT Racquel Oleary M.D.       • MD ALERT... vancomycin per pharmacy protocol   pharmacy to dose Racquel Oleary M.D.       • insulin lispro (HUMALOG) injection 1-6 Units  1-6 Units 4X/DAY ACHS Racquel Oleary M.D.   1 Units at 10/28/17 2115   • glucose 4 g chewable tablet 16 g  16 g Q15 MIN PRN Racquel Oleary M.D.         And   • dextrose 50% (D50W) injection 25 mL  25 mL Q15 MIN PRN Racquel Oleary M.D.       • ondansetron (ZOFRAN) syringe/vial injection 4 mg  4 mg Q4HRS PRN Racquel Oleary M.D.   4 mg at 10/28/17 1721   • ondansetron (ZOFRAN ODT) dispertab 4 mg  4 mg Q4HRS PRN Racquel Oleary M.D.   Stopped at 10/28/17 1721   • promethazine (PHENERGAN) tablet 12.5-25 mg  12.5-25 mg Q4HRS PRN Racquel Oleary M.D.       • promethazine (PHENERGAN) suppository 12.5-25 mg  12.5-25 mg Q4HRS PRN Racquel Oleary M.D.       • prochlorperazine (COMPAZINE) injection 5-10 mg  5-10 mg Q4HRS PRN Racquel Oleary M.D.       • piperacillin-tazobactam (ZOSYN) IVPB premix 4.5 g  4.5 g Q6HRS Racquel Oleary M.D.   4.5 g at 10/29/17 0358   • ambrisentan (LETAIRIS) TABS 10 mg  10 mg DAILY Racquel Oleary M.D.   10 mg at 10/28/17 1132   • vancomycin 1,200 mg in  mL IVPB  15 mg/kg Q12HR Adeola Aguilar, PharmD   Stopped at 10/29/17 0336     Last reviewed on 10/27/2017  3:44 PM by Maryann Belcher R.N.    Quality  Measures:  Labs reviewed, Medications reviewed and Radiology images reviewed              Antibiotics: Treating active infection/contamination beyond 24 hours perioperative coverage  Assessed for rehab: Patient was assess for and/or received rehabilitation services during this hospitalization      Problems/plan:  Acute-on-chronic respiratory failure with mildly increased O2 requirements over baseline.   O2 protocols   Titrate to >=92-94% - may be able to wean O2 - patient relcutant  Febrile illness, admitted with certainly systemic inflammatory response syndrome, possible sepsis, query related to her recent abdominal surgery for resection of her pannus.  Wound is not exceptionally abnormal consistent with infectious process; CT abd/pelvis is underwhelming for a source;  however, the patient is clearly immunocompromised.   IV Vanco/Zosyn   ID to see today   Cultures pending   Doubt Pulm source  Status post liver transplantation  approximately 6 years ago for hepatopulmonary syndrome, immunocompromised with CellCept, tacrolimus, and prednisone chronically.  History of prior recurrent infections times many.   For now continue rejection meds  Sarcoidosis with chronic obstructive lung disease and abnormal chest x-ray.   Monitor   Spiriva   RT protocols - PRN nebs  Pulmonary hypertension, improved, now mild, last echocardiogram this year with RVSP 35, currently on tadalafil  and ambrisentan, followed by Dr. Gaming in the [facility].   Continue current meds   F/u ECHO with doppler PRN  Sleep apnea, failed obstructive sleep apnea treatment with CPAP in the past, but has successfully lost greater than 100 pounds, status post panniculectomy.   No S/s since dropping > 100#!   F/u CNOX as outpatient per Dr Gaming PRN  Chronic thrombocytopenia secondary to above.  No evidence of bleeding at this time.   Serial CBC - transfuse PRN  Chronic pain syndrome, on narcotics, being managed by Dr. Real at this time.  Hypothyroidism, on replacement - Synthroid  History of gastroesophageal reflux disease, on therapy - PPI  History of diabetes type 2, on therapy - SSI  Chronic kidney disease secondary to above.  Lifelong nonsmoker.  Low cortisol level, chronically on steroids.  Prophyalxis  Ok to tele, [facility]    Discussed patient condition and risk of morbidity and/or mortality with RN, RT, Pharmacy, Charge nurse / hot rounds, Patient and hospitalist and infectious disease.

## 2017-10-29 NOTE — CONSULTS
DATE OF SERVICE:  10/28/2017    PULMONARY CRITICAL CARE NOTE    I was asked to see the patient by Dr. Josemanuel Real.    REASON FOR CONSULTATION:  Sepsis syndrome, SIRS, chronic respiratory failure,   hepatopulmonary syndrome, status post hepatic transplant; sarcoidosis,   pulmonary hypertension, and sleep apnea.    HISTORY OF PRESENT ILLNESS:  The patient is a 57-year-old woman, who I have   seen in the past last in 2016 for acute-on-chronic hypoxemic respiratory   arrest with possible atypical infection/HCAP including possibility of fungal   infection.  She clinically improved and has been working on weight loss.  She   has LUCIUS and failed to ever tolerate CPAP, although did not give much of a   trial to CPAP at home unfortunately.  She was successful, though with dramatic   weight loss in the greater than 100-pound range per her history.  She   developed some loose pannus that was problematic and saw a surgeon in the [place] area and had a panniculectomy done 3 days ago.  She was sent home   from her perspective as a prematurely and has borderline blood pressures   postop.  She presented to the hospital and was admitted by Dr. Sanches yesterday   for what she was concerned for, which was sepsis.  She reported symptoms of   fever, chills, shortness of breath, and malaise.  She tells me she can always   tell when she is septic.  She has done a good job over the years with her   immunocompromised transplant made for her liver transplantation and has been   careful with infection control.  She, from a respiratory standpoint, is   actually doing okay.  She is not using respiratory med except for the oxygen   now.  She does have occasional cough and wheeze, but very rarely uses her   albuterol rescue inhaler.  Spiriva inhalation once a day may be helping her,   she is not sure.  She follows with Dr. Gaming in the [facility] office.    Her LUCIUS symptoms have improved with the weight loss.  She is up-to-date with    vaccines as clinically appropriate per her transplant physician she stated.    She is not on any therapy for fungal infection at this time.  Her symptoms   have improved since arrival and is receiving fluid boluses and being started   on antibiotics.  She denies any chest pain or hemoptysis today.  Oxygen   requirements are little up from her baseline, 4 liters at home.  She is on 5   or 6 liters here, but oxygen saturations are acceptable.  Blood pressure has   been borderline in the  range.  She is not on pressors intravenously at   the time I saw her.  Cultures are negative so far this admission.  She denies   significant drainage from her incision, but the left side gives her pain, and   it radiates around her back, it radiates down her groin.  No history of kidney   stones, and no dysuria or hematuria.  Did a cortisol level last night of 1.2   and is on chronic steroids.  Excess steroids do cause her to have significant   side effects.  She is placed on low-dose supplementation of her Deltasone   orally.  Her antirejection medications including CellCept, prednisone, and   tacrolimus have been continued.    PAST MEDICAL HISTORY:  Significant for chronic respiratory failure on O2   therapy, chronic obstructive lung disease related to sarcoidosis.  She is a   never smoker.  Hepatopulmonary syndrome related to same, status post hepatic   transplant about 6 years ago, chronically immunosuppressed for that.  History   of multiple infectious processes including C. diff, VRE ____ possible fungal   infection, etc.  See the extensive EHR notes.  History of prior bowel   obstruction, cholecystectomy.  History of pulmonary hypertension, on   ambrisentan therapy.  Mild aortic valvular disease, hypothyroidism, GERD,   diabetes, ____, improved; obstructive sleep apnea-hypopnea syndrome, failed   CPAP trial, improved with weight loss, status post panniculectomy few days   ago.    ALLERGIES:  VANCOMYCIN CAUSES RED MAN  SYNDROME, ____ RHUBARB, ORGANIC   NITRATES.    MEDICATIONS:  Med rec form is reviewed.  Her current medications in the MAR   include alprazolam, ambrisentan, fluticasone, gabapentin, insulin lispro,   levothyroxine, vancomycin per protocol, mycophenolate, omeprazole,   piperacillin/tazobactam, pravastatin, prednisone, bowel care protocol,   sertraline, tacrolimus, tadalafil, tiotropium, vancomycin.  Doses and   frequencies are well outlined in the EHR.  They are reviewed by clinical   pharmacist at intensive care rounds at bedside as well as with the nursing   staff and the patient.    FAMILY HISTORY:  Noncontributory at this point, but she has extensive one.    Father has diabetes, heart disease, dyslipidemia, and hypertension as well as   stroke.  Mother with dyslipidemia and issues with alcohol.  There is an aunt   with cancer and grandparents with alcohol use.    SOCIAL HISTORY:  The patient is a never smoker, does have chronic pain, for   which, she takes chronic narcotics, which apparently has been problematic in   the past per extensive records.  She recognizes this.  Lives here locally.    REVIEW OF SYSTEMS:  From a systemic standpoint, she did present with fever,   chills, and nonspecific symptoms of fatigue, discomfort.  From an HEENT   standpoint, she had no URI symptoms or signs or symptoms of infection.  From   respiratory standpoint, she does have some chronic cough, shortness of breath,   rare wheezing, but no chest pain, hemoptysis, and no change from baseline   except for little dyspnea as she felt ill.  She is chronically on O2 as   outlined above.   Cardiovascular standpoint, she has had no chest pain,   palpitations, syncope.  From a GI standpoint, she has abdominal pain and her   incision are left lower half of it all way across her abdomen and has a drain   in place.  She has no nausea, vomiting, diarrhea though.  -wise, she has no   dysuria, frequency, urgency, or hematuria, although some  discomfort that   radiates down into her left groin from her incision as mentioned above.  From   neurologic standpoint, no headache, seizure, TIA, or stroke-like symptoms.  No   stiff neck, no photophobia.  All from other perspective grossly negative.    PHYSICAL EXAMINATION:  VITAL SIGNS:  T-max since arrival 98.7, current temperature 37.9; heart rate   71; respiratory rate 13-19, unlabored at this point on 5-liter nasal cannula;   saturating around 94%-95%, speaking in full sentences.  Alert, oriented, and   appropriate.  Last charted blood pressure 91/44.  She has been running in the   low to mid 90s since admit with the highest blood pressure today 101/51.  The   highest blood pressure since admit 115/46.  HEENT:  Exam reveals pupils that are PERRL.  Sinuses nontender.  Oropharynx   clear.  Mallampati 1 airway, no thrush.  NECK:  Supple.  No meningeal signs.  Trachea is midline.  No thyromegaly.  CHEST:  Exam reveals diminished breath sounds diffusely.  Few coarse rales,   slightly clear with coughing, mainly at the left base.  Those are chronic per   the patient.  She has no wheezes at this time.  CARDIAC:  Exam reveals distant heart tones, regular rhythm.  No murmur.  LUNGS:  She has good capillary refill and strong pulses at this time.  No   mottling, no diaphoresis.  ABDOMEN:  Obese, but improved significant from when I last saw her.  She has   got a low abdominal incision that goes all way across her abdomen and a drain   in place.  Incision looks clean and dry.  She has no CVAT.  EXTREMITIES:  Reveal no cyanosis or clubbing.  Trace edema.  NEUROLOGIC:  She is awake, alert, and appropriate.  Moves all extremities   well.  Sensation intact.  Strength intact.  I did not get her out of bed to   ambulate her at this time.    Chest x-rays are reviewed serially, most recent film yesterday revealed   grossly normal cardiac silhouette.  There are some bibasilar densities   consistent with atelectasis, definitely  worse on the left.  There may be a   small effusion there.  No bony structure abnormalities, no change when   compared to most recent other film in our system approximately 2-1/2 weeks   ago.    LABORATORY DATA:  White count is 4.4 today, hematocrit 26.3, platelet count   84.  She chronically does run low, of last the lowest has been in the last 3-4   weeks.  Differential is noted.  Chemistries today; sodium 141, potassium 4.1,   chloride 112, bicarbonate 23, anion gap 6, glucose 143, BUN 8, creatinine   0.78, calcium 7.5, albumin 2.8, corrects over 8.5.  Normal bili, total protein   slightly low.  Lactic acid 0.7 and 0.5 respectively in the last 24 hours or   so.  She had a negative troponin I.  BNP of 70.  Blood sugars have been up a   little bit, highest 228, low of 102.  INR yesterday 1.07, PTT 25.1, PT 13.6.    Urinalysis yesterday completely normal.  Cortisol level late yesterday 1.2.    Patient did have a mildly low IgG level on 10/11/2017, I think those stick to   the low range of 768.  She chronically has low immunoglobulin.  ID to see her   today.  Blood cultures yesterday are no growth so far as well as urine has no   growth.  CT abdomen and pelvis is reviewed in the EHR.  These films reveal   postoperative changes in the lower abdomen with a fluid that is consistent   more like hematoma without seroma or abscess.  There are surgical drains   present.  Nothing to suggest bleeding or intra-abdominal process.  There was   minimal bibasilar atelectasis or scarring, worse on the left than the right.    No pneumothorax or significant pulmonary issues on this CT of her abdomen   looking at her bases of her lung.    Echocardiogram was last performed on 01/31/2017 and revealed normal LV size   and function.  She had an EF of 65%.  Right ventricle is normal size and   function as well.  She has moderate MR and AI.  RVSP is estimated mildly   elevated at 35.    ASSESSMENT:  1.  Acute-on-chronic respiratory failure  with mildly increased O2 requirements   over baseline.  2.  Febrile illness, admitted with certainly systemic inflammatory response   syndrome, possible sepsis, query related to her recent abdominal surgery for   resection of her pannus.  Wound is not exceptionally abnormal consistent with   infectious process; however, the patient is clearly immunocompromised.  3.  Status post liver transplantation approximately 6 years ago for   hepatopulmonary syndrome, immunocompromised with CellCept, tacrolimus, and   prednisone chronically.  History of prior recurrent infections times many.  4.  Sarcoidosis with chronic obstructive lung disease and abnormal chest   x-ray.  5.  Pulmonary hypertension, improved, now mild, last echocardiogram this year   with RVSP 35, currently on ____ and ambrisentan, followed by Dr. Gaming in the   [facility].  6.  Sleep apnea, failed obstructive sleep apnea treatment with CPAP in the   past, but has successfully lost greater than 100 pounds, status post   panniculectomy.  7.  Chronic thrombocytopenia secondary to above.  No evidence of bleeding at   this time.  8.  Chronic pain syndrome, on narcotics, being managed by Dr. Real at this   time.  9.  Hypothyroidism, on replacement.  10.  History of gastroesophageal reflux disease, on therapy.  11.  History of diabetes type 2, on therapy.  12.  Chronic kidney disease secondary to above.  13.  Lifelong nonsmoker.  14.  Low cortisol level, chronically on steroids.    RECOMMENDATIONS AND CLINICAL DECISION MAKING:  The patient is an extremely   complex medical patient with recent abdominal wall surgery.  She had   significant severe sleep apnea and fortunately through sheer will, she has   lost a lot of weight, despite all above limitations, problems outlined above.    Abdominal surgery to correct her pannus and continue to help her with weight   loss was performed successfully, but now she has SIRS and possible sepsis.    She actually  looks pretty good with acceptable findings on exam and CT of her   abdomen with normal lactic acid level, although low, but acceptable blood   pressure at this time.  IV antibiotics ongoing, the selection is appropriate.    She has been adequately fluid resuscitated.  I do not believe she needs   further resuscitation.  I agree with Dr. Real's plan to get infectious   disease in followup given her extreme complexity and chronic antibiotic use   and multiple prior infections.  Work closely with ID and the system in this   process.  I do not believe she has got a respiratory exacerbation at this   time.  Her x-ray is clearly abnormal, but it is chronic in most part and   probably related to her sarcoidosis, which dates back many years.  Some of the   basilar changes are atelectasis from her body shape and abdominal surgery   that she just had.  Early mobilization, incentive spirometry, encouraging   cognitive breathing will be extremely helpful to prevent healthcare-associated   pneumonia.  She is a bit reluctant to ambulate and ____ because of abdominal   pain, but we will work together and encourage her.  TAP or IPV will be added   to her incentive spirometry as needed.  Pain management is being controlled by   Dr. Real.  She has had some issues with this in the past, but he alone   will manage her narcotics.  We will continue her dual therapy for her   pulmonary hypertension.  Last check was quite some time ago.  She should have   a followup echocardiogram before the end of the year as an outpatient followup   with Dr. Gwyn Gaming.    Transfer medicines should be continued for now, and I agree with Dr. Real   to increase her prednisone to 10 mg b.i.d. for now.  When she is clinically   stable and ready for discharge, we will taper over less than a week back to   her baseline steroid doses.  Usual prophylaxis is ongoing.  No additional   prophylaxis is necessary at this time.  Her sleep apnea symptoms have    dramatically improved with weight loss.  She does not need to follow   polysomnograms.  If she does have increased pulmonary hypertension, I would   have her do home screening nocturnal oximetry study out of completeness.  We   will see what the next echocardiogram shows.  Monitor for bleeding with the   thrombocytopenia, probably related to her chronic conditions of medications.    At this point, she is at acceptable range, does not need a transfusion.  We   will follow the usual effusion policies.  Continue replacement therapy for her   hypothyroidism. We will continue to hold diuretics and   antihypertensives/nephro protecting agents for now.    Thank you very much for asking me to see this very challenging and interesting   patient.  It would be my pleasure to follow along with you and assist her   care in the ICU.  If patient wants to move out of the ICU, we will get the   [facility] pulmonary and sleep service to follow her up while on the payne at   [facility] and get her back into the pulmonary sleep clinic this   fall and early winter for followup echocardiogram and reassess her LUCIUS after   all her weight loss.       ____________________________________     MD ALISON ERWIN / SAVAGE    DD:  10/28/2017 22:40:35  DT:  10/29/2017 02:32:48    D#:  [id number]  Job#:  [id number]

## 2017-10-29 NOTE — CARE PLAN
Problem: Safety  Goal: Will remain free from injury  Outcome: PROGRESSING AS EXPECTED  Pt educated on importance of utilizing call bell prior to mobilization, call bell placed at bedside and will continue to monitor.     Problem: Pain Management  Goal: Pain level will decrease to patient's comfort goal  Outcome: PROGRESSING SLOWER THAN EXPECTED  Pt instructed to notify RN with any increase in pain, comfort level of 3-4 is goal. Will continue to treat pain as needed and pt has call bell at bedside

## 2017-10-29 NOTE — CONSULTS
ID Note    Case seen. Serially remove abx. Stop vanco today. Continue zosyn. Seems issue likely adrenal insufficiency but cannot rule out 100% infection. Monitor closely, but does not need ICU care.    Dictation to follow.    Dr Boston

## 2017-10-29 NOTE — PROGRESS NOTES
[facility]ist Progress Note    Date of Service: 10/29/2017    Chief Complaint  57 y.o. female admitted 10/27/2017 with fever.    Interval Problem Update  Ms. Cuba has a hx of sarcoidosis and liver transplant on chronic immunosuppressants that underwent a panniculectomy in [place] on 10/25. She presented with fever 102 and hypotension and has been admitted to the ICU with sepsis.   She notes ongoing significant pain.  She is tolerating regular diet. 10 ml out of each ANGIE over night.   I called her surgeon, Dr. Acuna at [phone] and we discussed her condition.    Consultants/Specialty  Pulmonology  [facility]  I discussed with Dr. Boston in person.  Disposition  tele        Review of Systems   Constitutional: Negative for chills and fever.   Respiratory: Positive for shortness of breath. Negative for cough.    Cardiovascular: Negative for chest pain and leg swelling.   Gastrointestinal: Positive for abdominal pain and constipation.   Psychiatric/Behavioral: The patient is nervous/anxious.         She has been crying and quite emotional which she attributes to steroids.   All other systems reviewed and are negative.     Physical Exam  Laboratory/Imaging   Hemodynamics  Temp (24hrs), Av.8 °C (98.2 °F), Min:36.2 °C (97.2 °F), Max:37.2 °C (99 °F)   Temperature: 37.2 °C (99 °F)  Pulse  Av.3  Min: 58  Max: 93 Heart Rate (Monitored): (!) 59  NIBP: 102/53      Respiratory      Respiration: (!) 7, Pulse Oximetry: 98 %     Work Of Breathing / Effort: Shallow  RUL Breath Sounds: Diminished, RML Breath Sounds: Diminished, RLL Breath Sounds: Diminished, MANJINDER Breath Sounds: Diminished, LLL Breath Sounds: Diminished    Fluids    Intake/Output Summary (Last 24 hours) at 10/29/17 0756  Last data filed at 10/29/17 0600   Gross per 24 hour   Intake             2580 ml   Output             1790 ml   Net              790 ml       Nutrition  Orders Placed This Encounter   Procedures   • Diet  Order     Standing Status:   Standing     Number of Occurrences:   1     Order Specific Question:   Diet:     Answer:   Diabetic [3]     Physical Exam   Constitutional: She is oriented to person, place, and time. No distress.   Neck: Neck supple.   Cardiovascular: Normal rate and regular rhythm.    Pulmonary/Chest: She has no wheezes. She has no rales.   Abdominal:   Midline incision with JPs without redness.  +fullness upper abdomen.   Musculoskeletal: She exhibits edema. She exhibits no tenderness.   Neurological: She is alert and oriented to person, place, and time.   Skin: Skin is dry. She is not diaphoretic. There is pallor.   Psychiatric:   Mildly anxious though pleasant.        Recent Labs      10/27/17   1036  10/28/17   0400  10/29/17   0330   WBC  5.1  4.4*  2.9*   RBC  3.41*  2.82*  2.93*   HEMOGLOBIN  9.9*  8.5*  8.6*   HEMATOCRIT  31.0*  26.3*  26.2*   MCV  90.9  93.3  89.4   MCH  29.0  30.1  29.4   MCHC  31.9*  32.3*  32.8*   RDW  46.1  47.1  44.8   PLATELETCT  57*  54*  56*   MPV  8.8*  10.3  10.0     Recent Labs      10/27/17   1036  10/28/17   0400  10/29/17   0330   SODIUM  139  141  142   POTASSIUM  3.7  4.1  3.9   CHLORIDE  108  112  110   CO2  23  23  26   GLUCOSE  132*  143*  190*   BUN  10  8  10   CREATININE  0.97  0.78  0.87   CALCIUM  8.9  7.5*  7.7*     Recent Labs      10/27/17   1036   APTT  25.1   INR  1.07     Recent Labs      10/27/17   1036   BNPBTYPENAT  70              Assessment/Plan     Septic shock (CMS-[facility])- (present on admission)   Assessment & Plan    Upon admission, she was tachycardic and hypotensive with BP 70's/30's.   She is immunocompromised and may not be able to mount an appropriate immune response.   She has received goal-directed therapy with IV fluid resuscitation.  Empiric antibiotic therapies for sepsis in the setting of recent surgery.  Chest x-ray is negative for consolidation.   Her wound does not appear infected.  [facility] consulted  CT  abd:  1.  Postoperative change of the lower abdominal wall with large hyperdense fluid collection consistent with hematoma.  Surgical drains are present.  No evidence to indicate ongoing arterial hemorrhage.  2.  No intra-abdominal inflammatory changes, peritoneal fluid or pneumothorax.  3.  Mild bibasilar atelectasis.  4.  Increased colonic fluid and stool.        Hypotension- (present on admission)   Assessment & Plan    Secondary to sepsis, treatment as noted above.        Immunocompromised state ([facility])- (present on admission)   Assessment & Plan    Patient is status post liver transplantation 5 years ago for history of cirrhosis.   Continue her on her home Prograf, prednisone, and CellCept.  Cortisol is low at 1. Stress-dose steroids.        Chronic use of opiate drugs therapeutic purposes- (present on admission)   Assessment & Plan    Continue her oxycodone 10 mg TID  IV morphine for abd pain s/p surgery        Thrombocytopenia (CMS-HCC)- (present on admission)   Assessment & Plan    Platelets are chronically low.  Currently platelet count of 54.        Pulmonary hypertension- (present on admission)   Assessment & Plan    This is chronic, continue home inhalers and tadalafil.  Possible pulmonology consult.        Sarcoidosis (CMS-HCC)- (present on admission)   Assessment & Plan    Hx of nodule in the spleen        Hypothyroidism, adult- (present on admission)   Assessment & Plan    Continue home Synthroid.            Reviewed items::  Labs reviewed and Medications reviewed  Holbrook catheter::  No Holbrook  DVT prophylaxis pharmacological::  Contraindicated - Anemia requiring blood transfusion  Antibiotics:  Treating active infection/contamination beyond 24 hours perioperative coverage

## 2017-10-30 ENCOUNTER — PATIENT OUTREACH (OUTPATIENT)
Dept: HEALTH INFORMATION MANAGEMENT | Facility: OTHER | Age: 57
End: 2017-10-30

## 2017-10-30 ENCOUNTER — APPOINTMENT (OUTPATIENT)
Dept: RADIOLOGY | Facility: MEDICAL CENTER | Age: 57
DRG: 862 | End: 2017-10-30
Attending: INTERNAL MEDICINE
Payer: MEDICARE

## 2017-10-30 DIAGNOSIS — J96.10 CHRONIC RESPIRATORY FAILURE, UNSPECIFIED WHETHER WITH HYPOXIA OR HYPERCAPNIA (HCC): ICD-10-CM

## 2017-10-30 LAB
ALBUMIN SERPL BCP-MCNC: 2.7 G/DL (ref 3.2–4.9)
ALBUMIN/GLOB SERPL: 1.5 G/DL
ALP SERPL-CCNC: 15 U/L (ref 30–99)
ALT SERPL-CCNC: 6 U/L (ref 2–50)
ANION GAP SERPL CALC-SCNC: 4 MMOL/L (ref 0–11.9)
AST SERPL-CCNC: 10 U/L (ref 12–45)
BASOPHILS # BLD AUTO: 0.3 % (ref 0–1.8)
BASOPHILS # BLD: 0.01 K/UL (ref 0–0.12)
BILIRUB SERPL-MCNC: 0.3 MG/DL (ref 0.1–1.5)
BUN SERPL-MCNC: 9 MG/DL (ref 8–22)
CALCIUM SERPL-MCNC: 8 MG/DL (ref 8.5–10.5)
CHLORIDE SERPL-SCNC: 109 MMOL/L (ref 96–112)
CO2 SERPL-SCNC: 28 MMOL/L (ref 20–33)
CREAT SERPL-MCNC: 0.97 MG/DL (ref 0.5–1.4)
EOSINOPHIL # BLD AUTO: 0.18 K/UL (ref 0–0.51)
EOSINOPHIL NFR BLD: 6.3 % (ref 0–6.9)
ERYTHROCYTE [DISTWIDTH] IN BLOOD BY AUTOMATED COUNT: 46.5 FL (ref 35.9–50)
GFR SERPL CREATININE-BSD FRML MDRD: 59 ML/MIN/1.73 M 2
GLOBULIN SER CALC-MCNC: 1.8 G/DL (ref 1.9–3.5)
GLUCOSE BLD-MCNC: 140 MG/DL (ref 65–99)
GLUCOSE BLD-MCNC: 141 MG/DL (ref 65–99)
GLUCOSE BLD-MCNC: 151 MG/DL (ref 65–99)
GLUCOSE BLD-MCNC: 157 MG/DL (ref 65–99)
GLUCOSE BLD-MCNC: 178 MG/DL (ref 65–99)
GLUCOSE BLD-MCNC: 182 MG/DL (ref 65–99)
GLUCOSE SERPL-MCNC: 167 MG/DL (ref 65–99)
HCT VFR BLD AUTO: 24.8 % (ref 37–47)
HGB BLD-MCNC: 8.1 G/DL (ref 12–16)
IMM GRANULOCYTES # BLD AUTO: 0.01 K/UL (ref 0–0.11)
IMM GRANULOCYTES NFR BLD AUTO: 0.3 % (ref 0–0.9)
LYMPHOCYTES # BLD AUTO: 0.87 K/UL (ref 1–4.8)
LYMPHOCYTES NFR BLD: 30.3 % (ref 22–41)
MCH RBC QN AUTO: 30.1 PG (ref 27–33)
MCHC RBC AUTO-ENTMCNC: 32.7 G/DL (ref 33.6–35)
MCV RBC AUTO: 92.2 FL (ref 81.4–97.8)
MONOCYTES # BLD AUTO: 0.26 K/UL (ref 0–0.85)
MONOCYTES NFR BLD AUTO: 9.1 % (ref 0–13.4)
NEUTROPHILS # BLD AUTO: 1.54 K/UL (ref 2–7.15)
NEUTROPHILS NFR BLD: 53.7 % (ref 44–72)
NRBC # BLD AUTO: 0 K/UL
NRBC BLD AUTO-RTO: 0 /100 WBC
PLATELET # BLD AUTO: 54 K/UL (ref 164–446)
PMV BLD AUTO: 9.5 FL (ref 9–12.9)
POTASSIUM SERPL-SCNC: 3.5 MMOL/L (ref 3.6–5.5)
PROT SERPL-MCNC: 4.5 G/DL (ref 6–8.2)
RBC # BLD AUTO: 2.69 M/UL (ref 4.2–5.4)
SODIUM SERPL-SCNC: 141 MMOL/L (ref 135–145)
WBC # BLD AUTO: 2.9 K/UL (ref 4.8–10.8)

## 2017-10-30 PROCEDURE — 700111 HCHG RX REV CODE 636 W/ 250 OVERRIDE (IP): Performed by: HOSPITALIST

## 2017-10-30 PROCEDURE — 85025 COMPLETE CBC W/AUTO DIFF WBC: CPT

## 2017-10-30 PROCEDURE — 700102 HCHG RX REV CODE 250 W/ 637 OVERRIDE(OP): Performed by: HOSPITALIST

## 2017-10-30 PROCEDURE — A9270 NON-COVERED ITEM OR SERVICE: HCPCS | Performed by: HOSPITALIST

## 2017-10-30 PROCEDURE — 80053 COMPREHEN METABOLIC PANEL: CPT

## 2017-10-30 PROCEDURE — 71010 DX-CHEST-LIMITED (1 VIEW): CPT

## 2017-10-30 PROCEDURE — 770020 HCHG ROOM/CARE - TELE (206)

## 2017-10-30 PROCEDURE — 99232 SBSQ HOSP IP/OBS MODERATE 35: CPT | Performed by: HOSPITALIST

## 2017-10-30 PROCEDURE — 36415 COLL VENOUS BLD VENIPUNCTURE: CPT

## 2017-10-30 PROCEDURE — 700105 HCHG RX REV CODE 258

## 2017-10-30 PROCEDURE — 82962 GLUCOSE BLOOD TEST: CPT | Mod: 91

## 2017-10-30 RX ORDER — SODIUM CHLORIDE 9 MG/ML
INJECTION, SOLUTION INTRAVENOUS
Status: COMPLETED
Start: 2017-10-30 | End: 2017-10-30

## 2017-10-30 RX ADMIN — TADALAFIL 40 MG: 20 TABLET ORAL at 21:00

## 2017-10-30 RX ADMIN — GABAPENTIN 600 MG: 300 CAPSULE ORAL at 15:10

## 2017-10-30 RX ADMIN — MORPHINE SULFATE 4 MG: 4 INJECTION INTRAVENOUS at 06:08

## 2017-10-30 RX ADMIN — GABAPENTIN 600 MG: 300 CAPSULE ORAL at 09:03

## 2017-10-30 RX ADMIN — MORPHINE SULFATE 4 MG: 4 INJECTION INTRAVENOUS at 15:10

## 2017-10-30 RX ADMIN — INSULIN LISPRO 1 UNITS: 100 INJECTION, SOLUTION INTRAVENOUS; SUBCUTANEOUS at 17:51

## 2017-10-30 RX ADMIN — ONDANSETRON 4 MG: 2 INJECTION INTRAMUSCULAR; INTRAVENOUS at 17:27

## 2017-10-30 RX ADMIN — AMBRISENTAN 10 MG: 10 TABLET, FILM COATED ORAL at 12:11

## 2017-10-30 RX ADMIN — FLUTICASONE PROPIONATE 100 MCG: 50 SPRAY, METERED NASAL at 21:41

## 2017-10-30 RX ADMIN — OMEPRAZOLE 40 MG: 20 CAPSULE, DELAYED RELEASE ORAL at 09:03

## 2017-10-30 RX ADMIN — SODIUM CHLORIDE 500 ML: 9 INJECTION, SOLUTION INTRAVENOUS at 03:51

## 2017-10-30 RX ADMIN — STANDARDIZED SENNA CONCENTRATE AND DOCUSATE SODIUM 2 TABLET: 8.6; 5 TABLET, FILM COATED ORAL at 21:41

## 2017-10-30 RX ADMIN — ONDANSETRON 4 MG: 2 INJECTION INTRAMUSCULAR; INTRAVENOUS at 12:57

## 2017-10-30 RX ADMIN — MORPHINE SULFATE 4 MG: 4 INJECTION INTRAVENOUS at 23:33

## 2017-10-30 RX ADMIN — TACROLIMUS 1.5 MG: 0.5 CAPSULE ORAL at 21:41

## 2017-10-30 RX ADMIN — MORPHINE SULFATE 4 MG: 4 INJECTION INTRAVENOUS at 19:37

## 2017-10-30 RX ADMIN — PRAVASTATIN SODIUM 20 MG: 20 TABLET ORAL at 09:03

## 2017-10-30 RX ADMIN — MORPHINE SULFATE 4 MG: 4 INJECTION INTRAVENOUS at 12:55

## 2017-10-30 RX ADMIN — ONDANSETRON 4 MG: 2 INJECTION INTRAMUSCULAR; INTRAVENOUS at 22:18

## 2017-10-30 RX ADMIN — GABAPENTIN 600 MG: 300 CAPSULE ORAL at 21:38

## 2017-10-30 RX ADMIN — INSULIN LISPRO 1 UNITS: 100 INJECTION, SOLUTION INTRAVENOUS; SUBCUTANEOUS at 12:23

## 2017-10-30 RX ADMIN — MYCOPHENOLATE MOFETIL 500 MG: 250 CAPSULE ORAL at 22:18

## 2017-10-30 RX ADMIN — INSULIN LISPRO 1 UNITS: 100 INJECTION, SOLUTION INTRAVENOUS; SUBCUTANEOUS at 06:14

## 2017-10-30 RX ADMIN — MORPHINE SULFATE 4 MG: 4 INJECTION INTRAVENOUS at 10:48

## 2017-10-30 RX ADMIN — TAZOBACTAM SODIUM AND PIPERACILLIN SODIUM 4.5 G: 500; 4 INJECTION, SOLUTION INTRAVENOUS at 15:27

## 2017-10-30 RX ADMIN — MORPHINE SULFATE 4 MG: 4 INJECTION INTRAVENOUS at 03:48

## 2017-10-30 RX ADMIN — ALPRAZOLAM 0.5 MG: 0.5 TABLET ORAL at 09:02

## 2017-10-30 RX ADMIN — FLUTICASONE PROPIONATE 100 MCG: 50 SPRAY, METERED NASAL at 09:04

## 2017-10-30 RX ADMIN — STANDARDIZED SENNA CONCENTRATE AND DOCUSATE SODIUM 2 TABLET: 8.6; 5 TABLET, FILM COATED ORAL at 09:04

## 2017-10-30 RX ADMIN — LEVOTHYROXINE SODIUM 137 MCG: 137 TABLET ORAL at 06:16

## 2017-10-30 RX ADMIN — MORPHINE SULFATE 4 MG: 4 INJECTION INTRAVENOUS at 21:38

## 2017-10-30 RX ADMIN — PREDNISONE 10 MG: 5 TABLET ORAL at 09:04

## 2017-10-30 RX ADMIN — TAZOBACTAM SODIUM AND PIPERACILLIN SODIUM 4.5 G: 500; 4 INJECTION, SOLUTION INTRAVENOUS at 03:48

## 2017-10-30 RX ADMIN — MYCOPHENOLATE MOFETIL 500 MG: 250 CAPSULE ORAL at 09:03

## 2017-10-30 RX ADMIN — OXYCODONE HYDROCHLORIDE 10 MG: 10 TABLET ORAL at 12:14

## 2017-10-30 RX ADMIN — TACROLIMUS 1.5 MG: 0.5 CAPSULE ORAL at 09:04

## 2017-10-30 RX ADMIN — TAZOBACTAM SODIUM AND PIPERACILLIN SODIUM 4.5 G: 500; 4 INJECTION, SOLUTION INTRAVENOUS at 09:03

## 2017-10-30 RX ADMIN — SERTRALINE 100 MG: 100 TABLET, FILM COATED ORAL at 21:38

## 2017-10-30 RX ADMIN — TAZOBACTAM SODIUM AND PIPERACILLIN SODIUM 4.5 G: 500; 4 INJECTION, SOLUTION INTRAVENOUS at 21:38

## 2017-10-30 RX ADMIN — ALPRAZOLAM 0.5 MG: 0.5 TABLET ORAL at 15:10

## 2017-10-30 RX ADMIN — TIOTROPIUM BROMIDE 1 CAPSULE: 18 CAPSULE ORAL; RESPIRATORY (INHALATION) at 09:04

## 2017-10-30 RX ADMIN — ONDANSETRON 4 MG: 2 INJECTION INTRAMUSCULAR; INTRAVENOUS at 08:40

## 2017-10-30 RX ADMIN — ALPRAZOLAM 0.5 MG: 0.5 TABLET ORAL at 21:38

## 2017-10-30 RX ADMIN — MORPHINE SULFATE 4 MG: 4 INJECTION INTRAVENOUS at 17:26

## 2017-10-30 RX ADMIN — MORPHINE SULFATE 4 MG: 4 INJECTION INTRAVENOUS at 08:41

## 2017-10-30 RX ADMIN — TRAZODONE HYDROCHLORIDE 100 MG: 100 TABLET ORAL at 22:18

## 2017-10-30 ASSESSMENT — PAIN SCALES - GENERAL
PAINLEVEL_OUTOF10: 5
PAINLEVEL_OUTOF10: 9
PAINLEVEL_OUTOF10: 8
PAINLEVEL_OUTOF10: 9
PAINLEVEL_OUTOF10: 8
PAINLEVEL_OUTOF10: 9
PAINLEVEL_OUTOF10: 8

## 2017-10-30 ASSESSMENT — ENCOUNTER SYMPTOMS
DIARRHEA: 0
FEVER: 0
CONSTIPATION: 0
FOCAL WEAKNESS: 0
SHORTNESS OF BREATH: 0
NAUSEA: 0
CHILLS: 0
NERVOUS/ANXIOUS: 1
VOMITING: 0
ABDOMINAL PAIN: 1
MYALGIAS: 0
COUGH: 0
WEAKNESS: 0

## 2017-10-30 NOTE — PROGRESS NOTES
Telephone and bedside report completed per policy and procedure. 12-hour chart check completed. Pt stood and pivoted from wheelchair to bed while demonstrating proper safety precautions. Assuming care of pt at this time.

## 2017-10-30 NOTE — PROGRESS NOTES
Pulmonary Critical Care Progress Note            History of Present Illness:  The patient is a 57-year-old woman, who I have   seen in the past last in 2016 for acute-on-chronic hypoxemic respiratory   arrest with possible atypical infection/HCAP including possibility of fungal   infection.  She clinically improved and has been working on weight loss.  She   has LUCIUS and failed to ever tolerate CPAP, although did not give much of a   trial to CPAP at home unfortunately.  She was successful, though with dramatic   weight loss in the greater than 100-pound range per her history.  She   developed some loose pannus that was problematic and saw a surgeon in the [place] area and had a panniculectomy done 3 days ago.  She was sent home   from her perspective as a prematurely and has borderline blood pressures   postop.  She presented to the hospital and was admitted by Dr. Oleary yesterday   for what she was concerned for, which was sepsis.  She reported symptoms of   fever, chills, shortness of breath, and malaise.  She tells me she can always   tell when she is septic.  She has done a good job over the years with her   immunocompromised transplant made for her liver transplantation and has been   careful with infection control.  She, from a respiratory standpoint, is   actually doing okay.  She is not using respiratory med except for the oxygen   now.  She does have occasional cough and wheeze, but very rarely uses her   albuterol rescue inhaler.  Spiriva inhalation once a day may be helping her,   she is not sure.  She follows with Dr. Gaming in the [facility] office.    Her LUCIUS symptoms have improved with the weight loss.  She is up-to-date with   vaccines as clinically appropriate per her transplant physician she stated.    She is not on any therapy for fungal infection at this time.  Her symptoms   have improved since arrival and is receiving fluid boluses and being started   on antibiotics.  She denies any  chest pain or hemoptysis today.  Oxygen   requirements are little up from her baseline, 4 liters at home.  She is on 5   or 6 liters here, but oxygen saturations are acceptable.  Blood pressure has   been borderline in the  range.  She is not on pressors intravenously at   the time I saw her.  Cultures are negative so far this admission.  She denies   significant drainage from her incision, but the left side gives her pain, and   it radiates around her back, it radiates down her groin.  No history of kidney   stones, and no dysuria or hematuria.  Did a cortisol level last night of 1.2   and is on chronic steroids.  Excess steroids do cause her to have significant   side effects.  She is placed on low-dose supplementation of her Deltasone   orally.  Her antirejection medications including CellCept, prednisone, and   tacrolimus have been continued.    ROS:    Respiratory: negative cough, negative hemoptysis, negative pleuritic chest pain, negative shortness of breath, negative sputum production and negative wheezing,   Cardiac: negative chest pain and negative palpitations,   GI: negative nausea, negative vomiting and negative abdominal pain.     Interval Events:  24 hour interval history reviewed   Reason for visit:  Acute on chronic RF, sepsis vs SIRS, hepatopulmonary syndrome, obstructive lung disease, sarcoidosis, LUCIUS    10/29 - Following well  5 lpm - 99% - uses 5 lpm at home - a bit reluctant  IS 2000  ID to see today  Vanco/Zosyn  Glucose noted  CXR no film today    10/30 - afebrile overnight, on 5 LPM which is her home therapy, lowish BP, minus 250 ml last 24 hours, today's labs reveal similar pancytopenia, hypokalemia, hyperglycemia, hypoalbuminemia, hypoglobulinemia, 10/27 BC negative, cxr today shows bibasilar opacities without significant change; 10/27 CT: 1.  Postoperative change of the lower abdominal wall with large hyperdense fluid collection consistent with hematoma.  Surgical drains are  present.  No evidence to indicate ongoing arterial hemorrhage.  2.  No intra-abdominal inflammatory changes, peritoneal fluid or pneumothorax.  3.  Mild bibasilar atelectasis.  4.  Increased colonic fluid and stool   Remains on zosyn, prograf, cellcept, prednisone.        PFSH:  No change.    Physical Exam:  Appearance: Well-nourished, well-developed, no acute distress; msew  Eyes:  PERRLA, EOMI  Hearing:  Grossly intact  Nose:  Normal, no lesions or deformities, turbinates moist  Oropharynx:  Tongue normal, posterior pharynx without erythema or exudate  Mallampati classification:    Neck: Supple, trachea midline, no masses  Respiratory effort:  No intercostal retractions or use of accessory muscles  Lung auscultation:  No wheezes rhonchi rubs or rales  Cardiac auscultation:  No murmurs, rubs, or gallops, no regular rhythm, normal rate  Abdomen:  Surgical site noted, drain in place  Extremities:  No cyanosis, clubbing, edema  Digits and nails: No clubbing, cyanosis, petechiae, or nodes  Musculoskeletal:  Grossly normal  Skin:  No rashes  Orientation:  Oriented time, place, and person  Mood and affect:  No depression, anxiety, agitation  Judgment:  Intact          Respiratory:     Pulse Oximetry: 96 %   NC O2 5 lpm - home O2 5 lpm  ABD Drains:                    Invalid input(s): [id number]    HemoDynamics:  Pulse: 75, Heart Rate (Monitored): 63  Blood Pressure: (!) 97/46, NIBP: 103/52       Exam: regular rhythm (Sinus), no ectopy, no edema  Imaging: Available data reviewed  Recent Labs      10/27/17   1036   TROPONINI  <0.01   BNPBTYPENAT  70       Neuro:  GCS Total Jose Cruz Coma Score: 15       Exam: no focal deficits noted mental status intact  Imaging: Available data reviewed    Fluids:  Intake/Output       10/28/17 0700 - 10/29/17 0659 10/29/17 0700 - 10/30/17 0659 10/30/17 0700 - 10/31/17 0659      1420-9997 8644-2618 Total 5389-5083 1100-1319 Total 5187-1662 2764-4592 Total       Intake    P.O.  800  600  1400  150  350 500  240  -- 240    P.O.  150 350 500 240 -- 240    I.V.  1020  110 1130  --  -- --  --  -- --    IV Piggyback Volume 520 110 630 -- -- -- -- -- --    IV Volume (NS) 500 -- 500 -- -- -- -- -- --    Other  --  50 50  --  -- --  --  -- --    Medications (P.O./ Enteral Liquids) -- 50 50 -- -- -- -- -- --    Total Intake 1813 993 4391 150 350 500 240 -- 240       Output    Urine  1550  200 1750  750  -- 750  --  -- --    Number of Times Voided -- 2 x 2 x 3 x 1 x 4 x -- -- --    Void (ml) 1685 993 6603 750 -- 750 -- -- --    Drains  20  20 40  --  -- --  --  -- --    Carlos Fox 2 10 10 20 -- -- -- -- -- --    Carlos Fox 1 10 10 20 -- -- -- -- -- --    Stool  --  -- --  --  -- --  --  -- --    Number of Times Stooled 1 x -- 1 x 2 x -- 2 x -- -- --    Total Output 7456 424 0696 750 -- 750 -- -- --       Net I/O     250 540 790 -600 350 -250 240 -- 240        Weight: 87.8 kg (193 lb 9 oz)  Recent Labs      10/28/17   0400  10/29/17   0330  10/30/17   0450   SODIUM  141  142  141   POTASSIUM  4.1  3.9  3.5*   CHLORIDE  112  110  109   CO2  23  26  28   BUN  8  10  9   CREATININE  0.78  0.87  0.97   CALCIUM  7.5*  7.7*  8.0*       GI/Nutrition:  Exam: normal active bowel sounds, diffusely tender, lower abdominal percussion tenderness  Imaging: Available data reviewed  taking PO  Liver Function  Recent Labs      10/28/17   0400  10/29/17   0330  10/30/17   0450   ALTSGPT  7  7  6   ASTSGOT  18  12  10*   ALKPHOSPHAT  14*  19*  15*   TBILIRUBIN  0.4  0.3  0.3   GLUCOSE  143*  190*  167*       Heme:  Recent Labs      10/27/17   1036  10/28/17   0400  10/29/17   0330  10/30/17   0450   RBC  3.41*  2.82*  2.93*  2.69*   HEMOGLOBIN  9.9*  8.5*  8.6*  8.1*   HEMATOCRIT  31.0*  26.3*  26.2*  24.8*   PLATELETCT  57*  54*  56*  54*   PROTHROMBTM  13.6   --    --    --    APTT  25.1   --    --    --    INR  1.07   --    --    --        Infectious Disease:  Temp  Av.7 °C (98 °F)  Min: 36.4 °C (97.6  °F)  Max: 36.9 °C (98.5 °F)  Micro: antibiotics reviewed and cultures pending  Recent Labs      10/28/17   0400  10/29/17   0330  10/30/17   0450   WBC  4.4*  2.9*  2.9*   NEUTSPOLYS  84.90*  81.50*  53.70   LYMPHOCYTES  8.70*  11.00*  30.30   MONOCYTES  3.70  5.50  9.10   EOSINOPHILS  1.80  1.70  6.30   BASOPHILS  0.20  0.00  0.30   ASTSGOT  18  12  10*   ALTSGPT  7  7  6   ALKPHOSPHAT  14*  19*  15*   TBILIRUBIN  0.4  0.3  0.3     Current Facility-Administered Medications   Medication Dose Frequency Provider Last Rate Last Dose   • trazodone (DESYREL) tablet 100 mg  100 mg QHS PRN Josemanuel Real M.D.   100 mg at 10/29/17 2129   • predniSONE (DELTASONE) tablet 10 mg  10 mg BID Josemanuel Real M.D.   10 mg at 10/30/17 0904   • alprazolam (XANAX) tablet 0.5 mg  0.5 mg TID Josemanuel Real M.D.   0.5 mg at 10/30/17 0902   • fluticasone (FLONASE) nasal spray 100 mcg  2 Spray BID Josemanuel Real M.D.   100 mcg at 10/30/17 0904   • sertraline (ZOLOFT) tablet 100 mg  100 mg QHS Josemanuel Real M.D.   100 mg at 10/29/17 2127   • oxycodone immediate-release (ROXICODONE) tablet 5 mg  5 mg Q4HRS PRN Josemanuel Real M.D.        Or   • oxycodone immediate release (ROXICODONE) tablet 10 mg  10 mg Q4HRS PRN Josemanuel Real M.D.   10 mg at 10/29/17 1434   • morphine (pf) 4 mg/ml injection 1-4 mg  1-4 mg Q2HRS PRN Josemanuel Real M.D.   4 mg at 10/30/17 0841   • tiotropium (SPIRIVA) 18 MCG inhalation capsule 1 Cap  1 Cap DAILY Racquel Oleary M.D.   1 Cap at 10/30/17 0904   • Tadalafil (PAH) TABS 40 mg  40 mg QHS Racquel Oleary M.D.   40 mg at 10/29/17 2129   • tacrolimus (PROGRAF) capsule 1.5 mg  1.5 mg BID Racquel Oleary M.D.   1.5 mg at 10/30/17 0904   • pravastatin (PRAVACHOL) tablet 20 mg  20 mg DAILY Racquel Oleary M.D.   20 mg at 10/30/17 0903   • omeprazole (PRILOSEC) capsule 40 mg  40 mg DAILY Racquel Oleary M.D.   40 mg at 10/30/17 0903   • mycophenolate (CELLCEPT) capsule 500 mg  500 mg BID Racquel Oleary M.D.   500 mg at  10/30/17 0903   • levothyroxine (SYNTHROID) tablet 137 mcg  137 mcg AM ES Racquel Oleary M.D.   137 mcg at 10/30/17 0616   • gabapentin (NEURONTIN) capsule 600 mg  600 mg TID Racquel Oleary M.D.   600 mg at 10/30/17 0903   • cyclobenzaprine (FLEXERIL) tablet 10 mg  10 mg TID PRN Racquel Oleary M.D.       • NS infusion 2,355 mL  30 mL/kg Once PRN Racquel Oleary M.D.       • senna-docusate (PERICOLACE or SENOKOT S) 8.6-50 MG per tablet 2 Tab  2 Tab BID Racquel Oleary M.D.   2 Tab at 10/30/17 0904    And   • polyethylene glycol/lytes (MIRALAX) PACKET 1 Packet  1 Packet QDAY PRN Racquel Oleary M.D.   1 Packet at 10/29/17 0802    And   • magnesium hydroxide (MILK OF MAGNESIA) suspension 30 mL  30 mL QDAY PRN Racquel Oleary M.D.   Stopped at 10/28/17 0900    And   • bisacodyl (DULCOLAX) suppository 10 mg  10 mg QDAY PRN Racquel Oleary M.D.   10 mg at 10/29/17 0801   • Respiratory Care per Protocol   Continuous RT Racquel Oleary M.D.       • insulin lispro (HUMALOG) injection 1-6 Units  1-6 Units 4X/DAY ACHS Racquel Oleary M.D.   1 Units at 10/30/17 0614   • glucose 4 g chewable tablet 16 g  16 g Q15 MIN PRN Racquel Oleary M.D.        And   • dextrose 50% (D50W) injection 25 mL  25 mL Q15 MIN PRN Racquel Oleary M.D.       • ondansetron (ZOFRAN) syringe/vial injection 4 mg  4 mg Q4HRS PRN Racquel Oleary M.D.   4 mg at 10/30/17 0840   • ondansetron (ZOFRAN ODT) dispertab 4 mg  4 mg Q4HRS PRN Racquel Oleary M.D.   4 mg at 10/29/17 1509   • promethazine (PHENERGAN) tablet 12.5-25 mg  12.5-25 mg Q4HRS PRN Racquel Oleary M.D.       • promethazine (PHENERGAN) suppository 12.5-25 mg  12.5-25 mg Q4HRS PRN Racquel Oleary M.D.       • prochlorperazine (COMPAZINE) injection 5-10 mg  5-10 mg Q4HRS PRN Racquel Oleary M.D.       • piperacillin-tazobactam (ZOSYN) IVPB premix 4.5 g  4.5 g Q6HRS Racquel Oleary M.D.   4.5 g at 10/30/17 0903   • ambrisentan (LETAIRIS) TABS 10 mg  10 mg DAILY Racquel Oleary M.D.   10 mg at  10/29/17 1244     Last reviewed on 10/27/2017  3:44 PM by Maryann Belcher R.N.    Quality  Measures:   Labs reviewed, Medications reviewed and Radiology images reviewed               Antibiotics: Treating active infection/contamination beyond 24 hours perioperative coverage   Assessed for rehab: Patient was assess for and/or received rehabilitation services during this hospitalization      Problems/plan:    Acute-on-chronic respiratory failure with mildly increased O2 requirements over baseline.   O2 protocols   Titrate to >=92-94% - may be able to wean O2 - patient relcutant  Febrile illness, admitted with certainly systemic inflammatory response syndrome, possible sepsis, query related to her recent abdominal surgery for resection of her pannus.  Wound is not exceptionally abnormal consistent with infectious process; CT abd/pelvis is underwhelming for a source;  however, the patient is clearly immunocompromised.   IV Vanco/Zosyn   ID to see today   Cultures pending   Doubt Pulm source  Status post liver transplantation approximately 6 years ago for hepatopulmonary syndrome, immunocompromised with CellCept, tacrolimus, and prednisone chronically.  History of prior recurrent infections times many.   For now continue rejection meds  Sarcoidosis with chronic obstructive lung disease and abnormal chest x-ray.   Monitor   Spiriva   RT protocols - PRN nebs  Pulmonary hypertension, improved, now mild, last echocardiogram this year with RVSP 35, currently on tadalafil  and ambrisentan, followed by Dr. Gaming in the [facility].   Continue current meds   F/u ECHO with doppler PRN  Sleep apnea, failed obstructive sleep apnea treatment with CPAP in the past, but has successfully lost greater than 100 pounds, status post panniculectomy.   No S/s since dropping > 100#!   Recommend a home sleep study on RA after dc to document current LUCIUS situation/resolution  Chronic thrombocytopenia secondary to above.  No evidence of  bleeding at this time.   Serial CBC - transfuse PRN  Chronic pain syndrome, on narcotics, being managed by Dr. Real at this time.  Hypothyroidism, on replacement - Synthroid  History of gastroesophageal reflux disease, on therapy - PPI  History of diabetes type 2, on therapy - SSI  Chronic kidney disease secondary to above.  Lifelong nonsmoker.  Low cortisol level, chronically on steroids.  Prophylaxis

## 2017-10-30 NOTE — CONSULTS
DATE OF SERVICE:  10/29/2017    INFECTIOUS DISEASE CONSULTATION    DATE OF CONSULTATION:  10/29/2017.    REQUESTING PHYSICIAN:  Josemanuel Real MD    REASON FOR CONSULTATION:  Septic shock.    CONSULTING PHYSICIAN:  Josemanuel Boston MD    HISTORY OF PRESENT ILLNESS:  The patient is a 57-year-old female with past   medical history significant for chronic kidney disease stage III, diabetes,   cirrhosis, status post liver transplant, hypothyroidism, pulmonary   hypertension, and probable sarcoidosis, presents to the emergency room on   10/27/2017 with complaints of fever, shortness of breath and feeling unwell.    This is not something new for her.  She has had multiple hospitalizations for   symptoms of this in the past; looking at the past several encounters she has   had, recurrent pulmonary issues, required antibiotics and supplementary   oxygen.    On this occasion, the patient is only several days out from a [place]annic[place]   [place] [facility].    From discussion with Dr. Real who had talked to her surgeon, Dr. Barfield,   she tolerated the procedure well and was discharged on the same day   post-procedurally.  Unfortunately, around 24 hours after the surgery, the   patient developed fever, shortness of breath and malaise.  She came to the   emergency room for further care.    Upon evaluation in the emergency room, she was found to have some sharp, achy   abdominal pain, fevers, and malaise.  Evaluation showed hypotension with a   blood pressure of 70/34, temperature of 101.4, and a subjective temperature   that she took at home of 104, and white blood cell count was 5.1.  Examination   showed that her bilateral ANGIE drains were stable without erythema.  Her   abdomen is not significant for abdominal pains of the ordinary for   post-procedural environment.  A CT scan of the abdomen and pelvis with   contrast showed only postoperative changes.  There was some hypodense  fluid   collection where the drains were in place that is suggestive of hematoma.  No   significant issues otherwise.  Lungs were clear on chest x-ray.  The patient   was started on vancomycin and Zosyn after cultures and the testing was   coordinated.  Cortisol level additionally was done which was 1.2 and she   received supplementary steroid administration.  Infectious disease was   subsequently contacted.    Upon my interview with patient, her blood pressure is stable.  She is back to   her baseline.  No pulmonary issues and only incisional abdominal discomfort.    Patient denies any current fever, chills, night sweats, nausea, vomiting,   diarrhea, chest pain, palpitations, shortness of breath with cough, abdominal   pain, rash, unilateral lower extremity calf tenderness, or unilateral   weakness.    PAST MEDICAL HISTORY:  1.  Cirrhosis resulting in liver transplantation.  2.  Hypertension.  3.  Hypothyroidism.  4.  Chronic kidney disease stage III.  5.  Monoclonal gammopathy.  6.  Multiple pseudomembranous colitis episodes in 2009, 2013 and 2016.  7.  Chronic issues with respiratory infections.  8.  Pulmonary hypertension.  9.  Reduction mammoplasty in 1989.  10.  Hysterectomy.  11.  Closure of a patent foramen ovale.  12.  Carpal tunnel release.  13.  Laparoscopic cholecystectomy.  14.  Ventral herniorrhaphy x3.  15.  Gastric varices banding.  16.  Gastric sleeve.  17.  Diabetes.  18.  Fracture of left foot.  19.  Gastroesophageal reflux disease.  20.  Hypothyroidism.  21.  Home oxygen of 4 liters.  22.  Vancomycin-resistant enterococcus colonization.    FAMILY HISTORY:  No known earlier heart attacks, strokes, diabetes, or cancer.    SOCIAL HISTORY:  Patient is .  She is a retired  of a hospital,   nonsmoker, nondrinker since 2009.  No illicit drugs.  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    REVIEW OF SYSTEMS:  Please see the HPI.  All other review of systems is    negative on 14-point AMA/CMS criteria scale.    ALLERGIES:  No known drug allergies.  1.  RHUBARB CAUSES ANAPHYLAXIS.  2.  SENSITIVE TO VANCOMYCIN WITH IBRAHIMA SYNDROME.    HOME MEDICATIONS:  1.  Loxagyl.  2.  Xanax.  3.  Roxicodone.  4.  Prograf.  5.  Prednisone.  6.  Mycostatin.  7.  Benefiber   8.  MiraLax.  9.  Synthroid.  10.  Letairis.  11.  Pravachol.  12.  Flonase.  13.  Desyrel.  14.  Adcirca.  15.  Morphine.  16.  Lasix.  17.  Flexeril.  18.  Feosol.  19.  Linaclotide.  20.  Citracal.  21.  Ascorbic acid.  22.  Senokot.  23.  Ecotrin.  24.  Probiotic.  25.  Neurontin.  26.  Prilosec.  27.  Zofran.  28.  Spiriva.  29.  Zoloft.  30.  Colace.  31.  CellCept.    PHYSICAL EXAMINATION:  VITAL SIGNS:  Temperature 36.8, pulse 75, respiratory rate 19, blood pressure   113/57, saturating 92% on 5 liters.  GENERAL:  The patient is sitting upright in a chair, in no apparent distress.  HEENT:  Good dentition.  Moist mucous membranes.  No lymphadenopathy.  CARDIOVASCULAR:  Normal rate, regular rhythm.  No obvious murmur.  PULMONARY:  Clear to auscultation bilaterally.  No crackles or wheezes   appreciated.  ABDOMEN:  Nontender, nondistended.  Bowel sounds normoactive.  Surgical wound   dressing along the bottom portion of her abdomen that crosses the entire   abdomen with dressing dry, clean and intact.  EXTREMITIES:  No edema.  NEUROLOGIC:  Grossly nonfocal.  PSYCHIATRIC:  Appropriate mood and affect.    LABORATORY DATA:  WBC 2.9, hemoglobin 8.6, hematocrit 26.2, platelets 56.    Sodium is 142, potassium 3.9, chloride 110, bicarbonate 26, BUN 10, creatinine   0.87, glucose 190.  Calcium 7.7, AST 12, ALT 7, alkaline phosphatase 19,   total bilirubin 0.3, albumin 3, total protein 5, globulin 2.    MICRO:  From 10/27/2017, peripheral blood, no growth to date x2.  From 10/27/2017, urine culture negative with negative UA.    IMAGING:  From 10/27/2017, CT abdomen and pelvis, evidence of fluid collection   in the lower  abdominal wall, somewhat eccentric to the left, measuring   4x17x4.8, associated surgical drains present bilaterally, multiple surgical   clips, interval gas bubbles consistent with hematoma per radiology read.    IMPRESSION:  1.  Shock, most likely secondary to adrenal insufficiency - cannot rule out   sepsis from an infectious source.  2.  Status post panniculectomy - postop day #4.  3.  History of liver transplantation, on current Prograf, CellCept, and   prednisone.  4.  Pulmonary hypertension.  5.  Sarcoidosis.  6.  Hypothyroidism.  7.  Chronic kidney disease, stage III.  8.  Monoclonal gammopathy.  9.  Known history of pseudomembranous colitis x3.    PLAN:  At this period of time, the patient's shock could be multifactorial,   but mostly leaning towards an adrenal insufficiency etiology, cannot rule out   infectious etiology at this period of time, but the timing post-surgery seems   a little early for infection to arose unless it was there prior to the   procedure looking at the CT scan as well as her other laboratory data, and how   rapidly she improved, but with the interventions given suggest more easily   reversible cause like adrenal insufficiency, certainly steroid is appropriate.    We will cover with antibiotics at this time, yet serially remove antibiotics   and coverage over the next several days to ensure stabilization if infectious   etiology is present.  Wound care appropriate and coordinating this, requiring   home health, wound care, wound care services otherwise to the surgical site,   it is imperative to maximizing her post-hospitalization course and wound   healing in general, following her systemic inflammatory response syndrome   signs and symptoms also imperative.  I do not feel that she requires further   ICU care.  She is stable without pressor therapy required.  Patient feels at   baseline.  Drains need to continue to be monitored closely with serial   abdominal exams  appropriate.    Thank you for allowing me to take part in this patient's care.  Greater than   75 minutes of care time was used during this encounter.  The case was   discussed with Dr. Real and the patient as well as the RN.  Greater than   50% of the time was spent in direct patient care consult and counseling.       ____________________________________     MD TAM Rodriguez / SAVAGE    DD:  10/29/2017 11:41:42  DT:  10/29/2017 17:01:50    D#:  [id number]  Job#:  [id number]

## 2017-10-30 NOTE — PROGRESS NOTES
Infectious Disease Progress Note    Author: Silviano IBRAHIM Ludy Date & Time created: 10/30/2017  11:53 AM    Interval History:  Zosyn day #3    Previously had Vanco X 3 days    10/30: No acute issues. No fevers. Tolerating abx well. Pains remain to surgical wound site. Drains in place without erythema. Pulm saw and titrating O2.      Labs Reviewed, Medications Reviewed, Radiology Reviewed, Wound Reviewed, Fluids Reviewed and GI Nutrition Reviewed    Review of Systems:  Review of Systems   Constitutional: Negative for chills, fever and malaise/fatigue.   Respiratory: Negative for cough.    Cardiovascular: Negative for chest pain.   Gastrointestinal: Positive for abdominal pain (At surgical wound site). Negative for diarrhea, nausea and vomiting.   Genitourinary: Negative for dysuria.   Musculoskeletal: Negative for myalgias.   Skin: Negative for rash.   Neurological: Negative for focal weakness and weakness.       Physical Exam:  Physical Exam   Constitutional: She is oriented to person, place, and time. She appears well-developed and well-nourished.   Cardiovascular: Normal rate and regular rhythm.    No murmur heard.  Pulmonary/Chest: Effort normal and breath sounds normal. No respiratory distress.   Abdominal: Soft. Bowel sounds are normal. There is tenderness (Surgical site. Not at entry of drains.).   Musculoskeletal: She exhibits no edema.   Neurological: She is alert and oriented to person, place, and time. No cranial nerve deficit.   Skin: Skin is warm and dry. No rash noted.   Psychiatric: She has a normal mood and affect.       Labs:  Recent Results (from the past 24 hour(s))   ACCU-CHEK GLUCOSE    Collection Time: 10/29/17  5:15 PM   Result Value Ref Range    Glucose - Accu-Ck 141 (H) 65 - 99 mg/dL   ACCU-CHEK GLUCOSE    Collection Time: 10/29/17  9:04 PM   Result Value Ref Range    Glucose - Accu-Ck 182 (H) 65 - 99 mg/dL   CBC with Differential    Collection Time: 10/30/17  4:50 AM   Result Value Ref Range     WBC 2.9 (L) 4.8 - 10.8 K/uL    RBC 2.69 (L) 4.20 - 5.40 M/uL    Hemoglobin 8.1 (L) 12.0 - 16.0 g/dL    Hematocrit 24.8 (L) 37.0 - 47.0 %    MCV 92.2 81.4 - 97.8 fL    MCH 30.1 27.0 - 33.0 pg    MCHC 32.7 (L) 33.6 - 35.0 g/dL    RDW 46.5 35.9 - 50.0 fL    Platelet Count 54 (L) 164 - 446 K/uL    MPV 9.5 9.0 - 12.9 fL    Neutrophils-Polys 53.70 44.00 - 72.00 %    Lymphocytes 30.30 22.00 - 41.00 %    Monocytes 9.10 0.00 - 13.40 %    Eosinophils 6.30 0.00 - 6.90 %    Basophils 0.30 0.00 - 1.80 %    Immature Granulocytes 0.30 0.00 - 0.90 %    Nucleated RBC 0.00 /100 WBC    Neutrophils (Absolute) 1.54 (L) 2.00 - 7.15 K/uL    Lymphs (Absolute) 0.87 (L) 1.00 - 4.80 K/uL    Monos (Absolute) 0.26 0.00 - 0.85 K/uL    Eos (Absolute) 0.18 0.00 - 0.51 K/uL    Baso (Absolute) 0.01 0.00 - 0.12 K/uL    Immature Granulocytes (abs) 0.01 0.00 - 0.11 K/uL    NRBC (Absolute) 0.00 K/uL   Comp Metabolic Panel (CMP)    Collection Time: 10/30/17  4:50 AM   Result Value Ref Range    Sodium 141 135 - 145 mmol/L    Potassium 3.5 (L) 3.6 - 5.5 mmol/L    Chloride 109 96 - 112 mmol/L    Co2 28 20 - 33 mmol/L    Anion Gap 4.0 0.0 - 11.9    Glucose 167 (H) 65 - 99 mg/dL    Bun 9 8 - 22 mg/dL    Creatinine 0.97 0.50 - 1.40 mg/dL    Calcium 8.0 (L) 8.5 - 10.5 mg/dL    AST(SGOT) 10 (L) 12 - 45 U/L    ALT(SGPT) 6 2 - 50 U/L    Alkaline Phosphatase 15 (L) 30 - 99 U/L    Total Bilirubin 0.3 0.1 - 1.5 mg/dL    Albumin 2.7 (L) 3.2 - 4.9 g/dL    Total Protein 4.5 (L) 6.0 - 8.2 g/dL    Globulin 1.8 (L) 1.9 - 3.5 g/dL    A-G Ratio 1.5 g/dL   ESTIMATED GFR    Collection Time: 10/30/17  4:50 AM   Result Value Ref Range    GFR If African American >60 >60 mL/min/1.73 m 2    GFR If Non African American 59 (A) >60 mL/min/1.73 m 2   ACCU-CHEK GLUCOSE    Collection Time: 10/30/17  6:12 AM   Result Value Ref Range    Glucose - Accu-Ck 157 (H) 65 - 99 mg/dL     Results     Procedure Component Value Units Date/Time    URINE CULTURE(NEW) [[id number]] Collected:  10/27/17  "1200    Order Status:  Completed Specimen:  Urine Updated:  10/29/17 0828     Significant Indicator NEG     Source UR     Site --     Urine Culture No growth at 48 hours    Narrative:       Indication for culture:->Emergency Room Patient    BLOOD CULTURE [[id number]] Collected:  10/27/17 1123    Order Status:  Completed Specimen:  Blood from Peripheral Updated:  10/28/17 0830     Significant Indicator NEG     Source BLD     Site PERIPHERAL     Blood Culture --     No Growth    Note: Blood cultures are incubated for 5 days and  are monitored continuously.Positive blood cultures  are called to the RN and reported as soon as  they are identified.      Narrative:       Per Hospital Policy: Only change Specimen Src: to \"Line\" if  specified by physician order.    BLOOD CULTURE [[id number]] Collected:  10/27/17 1036    Order Status:  Completed Specimen:  Blood from Peripheral Updated:  10/28/17 0830     Significant Indicator NEG     Source BLD     Site PERIPHERAL     Blood Culture --     No Growth    Note: Blood cultures are incubated for 5 days and  are monitored continuously.Positive blood cultures  are called to the RN and reported as soon as  they are identified.      Narrative:       Per Hospital Policy: Only change Specimen Src: to \"Line\" if  specified by physician order.    Culture Respiratory W/ GRM STN [[id number]]     Order Status:  Completed Specimen:  Respirate from Sputum     URINALYSIS [[id number]] Collected:  10/27/17 1200    Order Status:  Completed Specimen:  Urine Updated:  10/27/17 1212     Color Yellow     Character Clear     Specific Gravity 1.017     Ph 6.0     Glucose Negative mg/dL      Ketones Negative mg/dL      Protein Negative mg/dL      Bilirubin Negative     Urobilinogen, Urine 0.2     Nitrite Negative     Leukocyte Esterase Negative     Occult Blood Negative     Micro Urine Req see below     Comment: Microscopic examination not performed when specimen is clear  and chemically negative for " protein, blood, leukocyte esterase  and nitrite.         Narrative:       Indication for culture:->Emergency Room Patient    Blood Culture [[id number]] Collected:  10/27/17 0000    Order Status:  Canceled Specimen:  Other from Peripheral     Blood Culture [[id number]] Collected:  10/27/17 0000    Order Status:  Canceled Specimen:  Other from Peripheral     Urine Culture [[id number]] Collected:  10/27/17 0000    Order Status:  Canceled Specimen:  Other from Urine, Clean Catch         Hemodynamics:  Temp (24hrs), Av.7 °C (98 °F), Min:36.4 °C (97.6 °F), Max:36.9 °C (98.5 °F)  Temperature: 36.8 °C (98.2 °F)  Pulse  Av.8  Min: 58  Max: 93Heart Rate (Monitored): 63  Blood Pressure: (!) 97/46, NIBP: 103/52     PIV Group Left Wrist 22g Saline Lock (Active)   Line Secured Taped 10/30/2017 12:04 AM   Site Condition / Description Assessed;Patent;Clean;Dry 10/30/2017 12:04 AM   Dressing Type / Description Transparent;Clean;Dry 10/30/2017 12:04 AM   Dressing Status Observed 10/30/2017 12:04 AM   Saline Locked Yes 10/30/2017 12:04 AM   Infiltration Grading Used by [facility] and CV 0 10/30/2017 12:04 AM   Phlebitis Scale (Used by [facility]) 0 10/30/2017 12:04 AM   Date Primary Tubing Changed 10/30/17 10/30/2017 12:04 AM   Date Secondary Tubing Changed 10/27/17 10/27/2017  8:00 PM   NEXT Primary Tubing Change  10/31/17 10/30/2017 12:04 AM   NEXT Secondary Tubing Change  10/29/17 10/28/2017  8:00 AM       PIV Group Left Forearm 20g Butterfly (Active)   Line Secured Taped 10/30/2017 12:04 AM   Site Condition / Description Assessed;Patent;Clean;Dry 10/30/2017 12:04 AM   Dressing Type / Description Transparent;Clean;Dry;Intact 10/30/2017 12:04 AM   Dressing Status Observed 10/30/2017 12:04 AM   Saline Locked Yes 10/30/2017 12:04 AM   Infiltration Grading Used by [facility] and CV 0 10/30/2017 12:04 AM   Phlebitis Scale (Used by [facility]) 0 10/30/2017 12:04 AM   Date Primary Tubing Changed 10/28/17 10/28/2017  1:00 PM   Date Secondary  Tubing Changed 10/28/17 10/28/2017  1:00 PM   NEXT Primary Tubing Change  10/31/17 10/28/2017  1:00 PM   NEXT Secondary Tubing Change  10/31/17 10/28/2017  1:00 PM     Wound:  Surgical Incision  Incision Bilateral Anterior;Lower Abdomen (Active)   Wound Bed Pink 10/28/2017  8:00 PM   Drainage  None 10/29/2017  8:00 PM   Periwound Skin Pink;Normal;Intact 10/29/2017  8:00 PM   Daily - Wound Closure Closure Strips 10/29/2017  8:00 PM   Dressing Options Dry Gauze 10/29/2017  8:00 PM   Dressing Status / Change Clean;Dry;Intact 10/29/2017 11:50 PM   Dressing Change Frequency Every 48 hrs 10/29/2017  8:00 PM   Next Dressing Change  10/28/17 10/27/2017  8:00 PM        Fluids:  Intake/Output       10/28/17 0700 - 10/29/17 0659 10/29/17 0700 - 10/30/17 0659 10/30/17 0700 - 10/31/17 0659      7574-6414 6771-8680 Total 3561-4750 3047-9998 Total 6956-3038 4611-2163 Total       Intake    P.O.  800  600 1400  150  350 500  240  -- 240    P.O.  150 350 500 240 -- 240    I.V.  1020  110 1130  --  -- --  --  -- --    IV Piggyback Volume 520 110 630 -- -- -- -- -- --    IV Volume (NS) 500 -- 500 -- -- -- -- -- --    Other  --  50 50  --  -- --  --  -- --    Medications (P.O./ Enteral Liquids) -- 50 50 -- -- -- -- -- --    Total Intake 6134 109 5370 150 350 500 240 -- 240       Output    Urine  1550  200 1750  750  -- 750  --  -- --    Number of Times Voided -- 2 x 2 x 3 x 1 x 4 x -- -- --    Void (ml) 4160 418 3231 750 -- 750 -- -- --    Drains  20  20 40  --  -- --  --  -- --    Carlos Fox 2 10 10 20 -- -- -- -- -- --    Carlos Fox 1 10 10 20 -- -- -- -- -- --    Stool  --  -- --  --  -- --  --  -- --    Number of Times Stooled 1 x -- 1 x 2 x -- 2 x -- -- --    Total Output 7996 990 1296 750 -- 750 -- -- --       Net I/O     250 540 790 -600 350 -250 240 -- 240        Weight: 87.8 kg (193 lb 9 oz)  GI/Nutrition:  Orders Placed This Encounter   Procedures   • Diet Order     Standing Status:   Standing     Number of  Occurrences:   1     Order Specific Question:   Diet:     Answer:   Diabetic [3]     Medications:  Current Facility-Administered Medications   Medication Last Dose   • trazodone (DESYREL) tablet 100 mg 100 mg at 10/29/17 2129   • predniSONE (DELTASONE) tablet 10 mg 10 mg at 10/30/17 0904   • alprazolam (XANAX) tablet 0.5 mg 0.5 mg at 10/30/17 0902   • fluticasone (FLONASE) nasal spray 100 mcg 100 mcg at 10/30/17 0904   • sertraline (ZOLOFT) tablet 100 mg 100 mg at 10/29/17 2127   • oxycodone immediate-release (ROXICODONE) tablet 5 mg      Or   • oxycodone immediate release (ROXICODONE) tablet 10 mg 10 mg at 10/29/17 1434   • morphine (pf) 4 mg/ml injection 1-4 mg 4 mg at 10/30/17 1048   • tiotropium (SPIRIVA) 18 MCG inhalation capsule 1 Cap 1 Cap at 10/30/17 0904   • Tadalafil (PAH) TABS 40 mg 40 mg at 10/29/17 2129   • tacrolimus (PROGRAF) capsule 1.5 mg 1.5 mg at 10/30/17 0904   • pravastatin (PRAVACHOL) tablet 20 mg 20 mg at 10/30/17 0903   • omeprazole (PRILOSEC) capsule 40 mg 40 mg at 10/30/17 0903   • mycophenolate (CELLCEPT) capsule 500 mg 500 mg at 10/30/17 0903   • levothyroxine (SYNTHROID) tablet 137 mcg 137 mcg at 10/30/17 0616   • gabapentin (NEURONTIN) capsule 600 mg 600 mg at 10/30/17 0903   • cyclobenzaprine (FLEXERIL) tablet 10 mg     • NS infusion 2,355 mL     • senna-docusate (PERICOLACE or SENOKOT S) 8.6-50 MG per tablet 2 Tab 2 Tab at 10/30/17 0904    And   • polyethylene glycol/lytes (MIRALAX) PACKET 1 Packet 1 Packet at 10/29/17 0802    And   • magnesium hydroxide (MILK OF MAGNESIA) suspension 30 mL Stopped at 10/28/17 0900    And   • bisacodyl (DULCOLAX) suppository 10 mg 10 mg at 10/29/17 0801   • Respiratory Care per Protocol     • insulin lispro (HUMALOG) injection 1-6 Units 1 Units at 10/30/17 0614   • glucose 4 g chewable tablet 16 g      And   • dextrose 50% (D50W) injection 25 mL     • ondansetron (ZOFRAN) syringe/vial injection 4 mg 4 mg at 10/30/17 0840   • ondansetron (ZOFRAN ODT)  dispertab 4 mg 4 mg at 10/29/17 1509   • promethazine (PHENERGAN) tablet 12.5-25 mg     • promethazine (PHENERGAN) suppository 12.5-25 mg     • prochlorperazine (COMPAZINE) injection 5-10 mg     • piperacillin-tazobactam (ZOSYN) IVPB premix 4.5 g 4.5 g at 10/30/17 0903   • ambrisentan (LETAIRIS) TABS 10 mg 10 mg at 10/29/17 1244     Medical Decision Making, by Problem:  Active Hospital Problems    Diagnosis   • Septic shock (CMS-[facility]) [A41.9, R65.21]   • Hypotension [I95.9]   • Immunocompromised state (CMS-[facility]) [D84.9]   • Pulmonary hypertension [I27.20]   • Sarcoidosis (CMS-[facility]) [D86.9]   • Hypothyroidism, adult [E03.9]   • Sepsis (CMS-[facility]) [A41.9]   • Chronic use of opiate drugs therapeutic purposes [Z79.891]   • Thrombocytopenia (CMS-[facility]) [D69.6]       Plan:  1.  Shock, most likely secondary to adrenal insufficiency - cannot rule out sepsis from an infectious source.  2.  Status post panniculectomy - postop day #5.  3.  History of liver transplantation, on current Prograf, CellCept, and prednisone.  4.  Pulmonary hypertension.  5.  Sarcoidosis.  6.  Hypothyroidism.  7.  Chronic kidney disease, stage III.  8.  Monoclonal gammopathy.  9.  Known history of pseudomembranous colitis x3.     PLAN:    Vanco has been stopped for >24 hours and no issues. Will stop zosyn today. No obvious source of infection. Does have a hematoma at the surgical site with drains in place - 4 x 17 x 4.8 cm. Her PCP will follow the drains per patient. Will likely need output monitoring and interval CT scanning to ensure stability. Hematoma does not appear infected. Would not want this fluid collection turning into an abscess. Feel can go Amox/flagyl to complete a total 7 days of therapy. Will want to watch to see if remains stable on orals alone prior to dispo. Continue to following her systemic inflammatory response syndrome signs and symptoms      Thank you for allowing me to take part in this patient's care.      Greater than 75 minutes of  care time was used during this encounter.  The case was discussed with the patient.   Greater than 50% of the time was spent in direct patient care consult and counseling.    Dr Boston

## 2017-10-30 NOTE — CARE PLAN
Problem: Safety  Goal: Will remain free from injury  Outcome: PROGRESSING AS EXPECTED      Problem: Pain Management  Goal: Pain level will decrease to patient's comfort goal  Outcome: PROGRESSING SLOWER THAN EXPECTED

## 2017-10-30 NOTE — DISCHARGE PLANNING
Care Transition Team Assessment    IHD spoke with pt's spouse over the phone due to pt refusing to participate in screening. Pt currently lives at home with her SO, who will be able to provide transportation home upon d/c. Per SO, pt uses a walker and home O2 provided by [employer]. She does have any HHC at this time.     Information Source  Orientation : Oriented x 4  Information Given By: Spouse  Informant's Name: Otis Cuba  Who is responsible for making decisions for patient? : Patient         Elopement Risk  Legal Hold: No  Ambulatory or Self Mobile in Wheelchair: No-Not an Elopement Risk    Interdisciplinary Discharge Planning  Does Admitting Nurse Feel This Could be a Complex Discharge?: Yes  Primary Care Physician: Bernarda Reyes  Lives with - Patient's Self Care Capacity: Spouse  Patient or legal guardian wants to designate a caregiver (see row info): No  Caregiver name: Otis  Caregiver relationship to patient:   Caregiver contact info: [phone]  Support Systems: Children, Family Member(s), Spouse / Significant Other  Housing / Facility: [address]  Do You Take your Prescribed Medications Regularly: Yes  Able to Return to Previous ADL's: Yes  Mobility Issues: No  Prior Services: None  Patient Expects to be Discharged to:: home  Assistance Needed: No  Durable Medical Equipment: Home Oxygen (5L NC @ baseline)    Discharge Preparedness  What is your plan after discharge?: Uncertain - pending medical team collaboration  What are your discharge supports?: Spouse  Prior Functional Level: Ambulatory, Independent with Activities of Daily Living, Independent with Medication Management, Uses Walker  Difficulity with ADLs: Walking  Difficulty with ADLs Comment: uses walker  Difficulity with IADLs: Driving  Difficulity with IADL Comments: assisted by spouse    Functional Assesment  Prior Functional Level: Ambulatory, Independent with Activities of Daily Living, Independent with Medication Management,  Uses Walker    Finances  Financial Barriers to Discharge: No  Prescription Coverage: Yes ([facility] in [place])    Vision / Hearing Impairment  Vision Impairment : No  Hearing Impairment : No    Values / Beliefs / Concerns  Values / Beliefs Concerns : No  Special Hospitalization Concerns: none         Domestic Abuse  Have you ever been the victim of abuse or violence?: No    Psychological Assessment  History of Substance Abuse: None  History of Psychiatric Problems: No  Non-compliant with Treatment: No    Discharge Risks or Barriers  Discharge risks or barriers?: No    Anticipated Discharge Information  Anticipated discharge disposition: Discharge needs currently unknown  Discharge Address: unknown at this time   Discharge Contact Phone Number: [phone]

## 2017-10-30 NOTE — DIETARY
Nutrition Services:  Poor PO on admit screen    Poor PO on Nutrition Admit Screen. Pt is currently on a Diabetic diet and per chart pt PO %. Ht: 172.7 cm, Wt: 87.8 kg, BMI 29.43.  Pt with surgical incision to lower abdomen.  Not currently being followed by the wound team.    Consult RD as needed. RD will re-screen weekly.      RD available PRN.

## 2017-10-30 NOTE — PROGRESS NOTES
[facility] Hospitalist Progress Note    Date of Service: 10/30/2017    Chief Complaint  57 y.o. female admitted 10/27/2017 with fever.    Interval Problem Update  Ms. Cuba has a hx of sarcoidosis and liver transplant on chronic immunosuppressants that underwent a panniculectomy in [place] on 10/25. She presented with fever 102 and hypotension and has been admitted to the ICU with sepsis.   She notes ongoing significant pain. 2 BMs after relistor.  She is tolerating regular diet well. Minimal drainage  out of each ANGIE over night.   I called her surgeon, Dr. Acuna at [phone] and we discussed her condition.    Consultants/Specialty  Pulmonology  [facility] Infectious Disease    Disposition  tele        Review of Systems   Constitutional: Negative for chills and fever.   Respiratory: Negative for cough and shortness of breath.    Cardiovascular: Negative for chest pain and leg swelling.   Gastrointestinal: Positive for abdominal pain. Negative for constipation.        2 BMs yesterday    Psychiatric/Behavioral: The patient is nervous/anxious.         Less labile moods today.   All other systems reviewed and are negative.     Physical Exam  Laboratory/Imaging   Hemodynamics  Temp (24hrs), Av.7 °C (98.1 °F), Min:36.4 °C (97.6 °F), Max:36.9 °C (98.5 °F)   Temperature: 36.8 °C (98.2 °F)  Pulse  Av.9  Min: 58  Max: 93 Heart Rate (Monitored): 63  Blood Pressure: (!) 97/46, NIBP: 103/52      Respiratory      Respiration: 15, Pulse Oximetry: 96 %     Work Of Breathing / Effort: Shallow  RUL Breath Sounds: Diminished, RML Breath Sounds: Diminished, RLL Breath Sounds: Diminished, MANJINDER Breath Sounds: Diminished, LLL Breath Sounds: Diminished    Fluids    Intake/Output Summary (Last 24 hours) at 10/30/17 0744  Last data filed at 10/29/17 2000   Gross per 24 hour   Intake              500 ml   Output              750 ml   Net             -250 ml       Nutrition  Orders Placed This Encounter   Procedures   • Diet  Order     Standing Status:   Standing     Number of Occurrences:   1     Order Specific Question:   Diet:     Answer:   Diabetic [3]     Physical Exam   Constitutional: She is oriented to person, place, and time. No distress.   Neck: Neck supple.   Cardiovascular: Normal rate and regular rhythm.    No murmur heard.  Pulmonary/Chest: Effort normal. She has no wheezes. She has no rales.   Abdominal:   Midline incision with JPs without redness.  Mild, diffuse tenderness   Musculoskeletal: She exhibits edema. She exhibits no tenderness.   Neurological: She is alert and oriented to person, place, and time.   Skin: Skin is dry. She is not diaphoretic. There is pallor.   Psychiatric:   Good spirits       Recent Labs      10/28/17   0400  10/29/17   0330  10/30/17   0450   WBC  4.4*  2.9*  2.9*   RBC  2.82*  2.93*  2.69*   HEMOGLOBIN  8.5*  8.6*  8.1*   HEMATOCRIT  26.3*  26.2*  24.8*   MCV  93.3  89.4  92.2   MCH  30.1  29.4  30.1   MCHC  32.3*  32.8*  32.7*   RDW  47.1  44.8  46.5   PLATELETCT  54*  56*  54*   MPV  10.3  10.0  9.5     Recent Labs      10/28/17   0400  10/29/17   0330  10/30/17   0450   SODIUM  141  142  141   POTASSIUM  4.1  3.9  3.5*   CHLORIDE  112  110  109   CO2  23  26  28   GLUCOSE  143*  190*  167*   BUN  8  10  9   CREATININE  0.78  0.87  0.97   CALCIUM  7.5*  7.7*  8.0*     Recent Labs      10/27/17   1036   APTT  25.1   INR  1.07     Recent Labs      10/27/17   1036   BNPBTYPENAT  70              Assessment/Plan     Septic shock (CMS-[facility])- (present on admission)   Assessment & Plan    Upon admission, she was tachycardic and hypotensive with BP 70's/30's.   She is immunocompromised and may not be able to mount an appropriate immune response.   She has received goal-directed therapy with IV fluid resuscitation.  Empiric antibiotic therapies for sepsis in the setting of recent surgery.  Chest x-ray is negative for consolidation.   Her wound does not appear infected and was examined today. Drains  will come out in PCP office on Friday.  [facility] Infectious Disease consulted: outpt abx for 7 days upon d/c.  CT abd:  1.  Postoperative change of the lower abdominal wall with large hyperdense fluid collection consistent with hematoma.  Surgical drains are present.  No evidence to indicate ongoing arterial hemorrhage.  2.  No intra-abdominal inflammatory changes, peritoneal fluid or pneumothorax.  3.  Mild bibasilar atelectasis.  4.  Increased colonic fluid and stool.        Hypotension- (present on admission)   Assessment & Plan    Secondary to sepsis, treatment as noted above.        Immunocompromised state (CMS-[facility])- (present on admission)   Assessment & Plan    Patient is status post liver transplantation 5 years ago for history of cirrhosis.   Continue her on her home Prograf, prednisone, and CellCept.  Cortisol was low at 1. Stress-dose steroids were given initially with IV hydrocortisone then tapering prednisone.        Chronic use of opiate drugs therapeutic purposes- (present on admission)   Assessment & Plan    Continue her oxycodone 10 mg TID  IV morphine for abd pain s/p surgery  Likely home tomorrow with a prescription of oral narcotics due to post-op pain.          Thrombocytopenia (CMS-HCC)- (present on admission)   Assessment & Plan    Platelets are chronically low.  Currently platelet count of 54.        Pulmonary hypertension- (present on admission)   Assessment & Plan    This is chronic, continue home inhalers and tadalafil.  Possible pulmonology consult.        Sarcoidosis (CMS-HCC)- (present on admission)   Assessment & Plan    Hx of nodule in the spleen        Hypothyroidism, adult- (present on admission)   Assessment & Plan    Continue home Synthroid.            Reviewed items::  Labs reviewed and Medications reviewed  Holbrook catheter::  No Holbrook  DVT prophylaxis pharmacological::  Contraindicated - High bleeding risk (low platelets.)  Antibiotics:  Treating active infection/contamination  beyond 24 hours perioperative coverage

## 2017-10-31 ENCOUNTER — PATIENT OUTREACH (OUTPATIENT)
Dept: HEALTH INFORMATION MANAGEMENT | Facility: OTHER | Age: 57
End: 2017-10-31

## 2017-10-31 VITALS
SYSTOLIC BLOOD PRESSURE: 117 MMHG | HEIGHT: 68 IN | TEMPERATURE: 98.9 F | RESPIRATION RATE: 18 BRPM | OXYGEN SATURATION: 96 % | BODY MASS INDEX: 29.34 KG/M2 | WEIGHT: 193.56 LBS | HEART RATE: 76 BPM | DIASTOLIC BLOOD PRESSURE: 60 MMHG

## 2017-10-31 PROBLEM — R65.21 SEPTIC SHOCK (HCC): Status: RESOLVED | Noted: 2017-10-28 | Resolved: 2017-10-31

## 2017-10-31 PROBLEM — Z98.890 S/P PANNICULECTOMY: Status: ACTIVE | Noted: 2017-10-31

## 2017-10-31 PROBLEM — I95.9 HYPOTENSION: Status: RESOLVED | Noted: 2017-10-27 | Resolved: 2017-10-31

## 2017-10-31 PROBLEM — A41.9 SEPTIC SHOCK (HCC): Status: RESOLVED | Noted: 2017-10-28 | Resolved: 2017-10-31

## 2017-10-31 PROBLEM — A41.9 SEPSIS (HCC): Status: RESOLVED | Noted: 2017-10-29 | Resolved: 2017-10-31

## 2017-10-31 LAB
ALBUMIN SERPL BCP-MCNC: 2.8 G/DL (ref 3.2–4.9)
ALBUMIN/GLOB SERPL: 1.6 G/DL
ALP SERPL-CCNC: 16 U/L (ref 30–99)
ALT SERPL-CCNC: 6 U/L (ref 2–50)
ANION GAP SERPL CALC-SCNC: 3 MMOL/L (ref 0–11.9)
AST SERPL-CCNC: 10 U/L (ref 12–45)
BASOPHILS # BLD AUTO: 0.7 % (ref 0–1.8)
BASOPHILS # BLD: 0.02 K/UL (ref 0–0.12)
BILIRUB SERPL-MCNC: 0.3 MG/DL (ref 0.1–1.5)
BUN SERPL-MCNC: 10 MG/DL (ref 8–22)
CALCIUM SERPL-MCNC: 8.2 MG/DL (ref 8.5–10.5)
CHLORIDE SERPL-SCNC: 110 MMOL/L (ref 96–112)
CO2 SERPL-SCNC: 29 MMOL/L (ref 20–33)
CREAT SERPL-MCNC: 1.03 MG/DL (ref 0.5–1.4)
EOSINOPHIL # BLD AUTO: 0.16 K/UL (ref 0–0.51)
EOSINOPHIL NFR BLD: 5.8 % (ref 0–6.9)
ERYTHROCYTE [DISTWIDTH] IN BLOOD BY AUTOMATED COUNT: 46 FL (ref 35.9–50)
GFR SERPL CREATININE-BSD FRML MDRD: 55 ML/MIN/1.73 M 2
GLOBULIN SER CALC-MCNC: 1.7 G/DL (ref 1.9–3.5)
GLUCOSE BLD-MCNC: 122 MG/DL (ref 65–99)
GLUCOSE BLD-MCNC: 155 MG/DL (ref 65–99)
GLUCOSE SERPL-MCNC: 162 MG/DL (ref 65–99)
HCT VFR BLD AUTO: 24.5 % (ref 37–47)
HGB BLD-MCNC: 8 G/DL (ref 12–16)
IMM GRANULOCYTES # BLD AUTO: 0.02 K/UL (ref 0–0.11)
IMM GRANULOCYTES NFR BLD AUTO: 0.7 % (ref 0–0.9)
LYMPHOCYTES # BLD AUTO: 0.87 K/UL (ref 1–4.8)
LYMPHOCYTES NFR BLD: 31.5 % (ref 22–41)
MCH RBC QN AUTO: 30 PG (ref 27–33)
MCHC RBC AUTO-ENTMCNC: 32.7 G/DL (ref 33.6–35)
MCV RBC AUTO: 91.8 FL (ref 81.4–97.8)
MONOCYTES # BLD AUTO: 0.27 K/UL (ref 0–0.85)
MONOCYTES NFR BLD AUTO: 9.8 % (ref 0–13.4)
NEUTROPHILS # BLD AUTO: 1.42 K/UL (ref 2–7.15)
NEUTROPHILS NFR BLD: 51.5 % (ref 44–72)
NRBC # BLD AUTO: 0 K/UL
NRBC BLD AUTO-RTO: 0 /100 WBC
PLATELET # BLD AUTO: 61 K/UL (ref 164–446)
PMV BLD AUTO: 9.3 FL (ref 9–12.9)
POTASSIUM SERPL-SCNC: 3.5 MMOL/L (ref 3.6–5.5)
PROT SERPL-MCNC: 4.5 G/DL (ref 6–8.2)
RBC # BLD AUTO: 2.67 M/UL (ref 4.2–5.4)
SODIUM SERPL-SCNC: 142 MMOL/L (ref 135–145)
WBC # BLD AUTO: 2.8 K/UL (ref 4.8–10.8)

## 2017-10-31 PROCEDURE — 700102 HCHG RX REV CODE 250 W/ 637 OVERRIDE(OP): Performed by: HOSPITALIST

## 2017-10-31 PROCEDURE — 36415 COLL VENOUS BLD VENIPUNCTURE: CPT

## 2017-10-31 PROCEDURE — 99239 HOSP IP/OBS DSCHRG MGMT >30: CPT | Performed by: HOSPITALIST

## 2017-10-31 PROCEDURE — A9270 NON-COVERED ITEM OR SERVICE: HCPCS | Performed by: HOSPITALIST

## 2017-10-31 PROCEDURE — 85025 COMPLETE CBC W/AUTO DIFF WBC: CPT

## 2017-10-31 PROCEDURE — 82962 GLUCOSE BLOOD TEST: CPT | Mod: 91

## 2017-10-31 PROCEDURE — 700111 HCHG RX REV CODE 636 W/ 250 OVERRIDE (IP): Performed by: HOSPITALIST

## 2017-10-31 PROCEDURE — 80053 COMPREHEN METABOLIC PANEL: CPT

## 2017-10-31 RX ORDER — ALPRAZOLAM 0.5 MG/1
0.5 TABLET ORAL 3 TIMES DAILY
Qty: 30 TAB | Refills: 0 | Status: SHIPPED | OUTPATIENT
Start: 2017-10-31 | End: 2017-12-29 | Stop reason: SDUPTHER

## 2017-10-31 RX ORDER — MORPHINE SULFATE 15 MG/1
15 TABLET ORAL EVERY 4 HOURS PRN
Qty: 30 TAB | Refills: 0 | Status: SHIPPED | OUTPATIENT
Start: 2017-10-31 | End: 2018-04-27

## 2017-10-31 RX ORDER — AMOXICILLIN 500 MG/1
500 CAPSULE ORAL 3 TIMES DAILY
Qty: 9 CAP | Refills: 0 | Status: SHIPPED | OUTPATIENT
Start: 2017-10-31 | End: 2017-11-03

## 2017-10-31 RX ORDER — GABAPENTIN 800 MG/1
800 TABLET ORAL 3 TIMES DAILY
Qty: 90 TAB | Refills: 3 | Status: SHIPPED | OUTPATIENT
Start: 2017-10-31 | End: 2017-12-29

## 2017-10-31 RX ORDER — METRONIDAZOLE 500 MG/1
500 TABLET ORAL EVERY 8 HOURS
Qty: 9 TAB | Refills: 0 | Status: SHIPPED | OUTPATIENT
Start: 2017-10-31 | End: 2017-11-03

## 2017-10-31 RX ADMIN — GABAPENTIN 600 MG: 300 CAPSULE ORAL at 08:46

## 2017-10-31 RX ADMIN — TIOTROPIUM BROMIDE 1 CAPSULE: 18 CAPSULE ORAL; RESPIRATORY (INHALATION) at 08:45

## 2017-10-31 RX ADMIN — MORPHINE SULFATE 4 MG: 4 INJECTION INTRAVENOUS at 03:39

## 2017-10-31 RX ADMIN — FLUTICASONE PROPIONATE 100 MCG: 50 SPRAY, METERED NASAL at 08:46

## 2017-10-31 RX ADMIN — MORPHINE SULFATE 4 MG: 4 INJECTION INTRAVENOUS at 11:37

## 2017-10-31 RX ADMIN — INSULIN LISPRO 1 UNITS: 100 INJECTION, SOLUTION INTRAVENOUS; SUBCUTANEOUS at 06:22

## 2017-10-31 RX ADMIN — MYCOPHENOLATE MOFETIL 500 MG: 250 CAPSULE ORAL at 08:46

## 2017-10-31 RX ADMIN — MORPHINE SULFATE 4 MG: 4 INJECTION INTRAVENOUS at 15:33

## 2017-10-31 RX ADMIN — MORPHINE SULFATE 4 MG: 4 INJECTION INTRAVENOUS at 09:42

## 2017-10-31 RX ADMIN — TAZOBACTAM SODIUM AND PIPERACILLIN SODIUM 4.5 G: 500; 4 INJECTION, SOLUTION INTRAVENOUS at 08:51

## 2017-10-31 RX ADMIN — STANDARDIZED SENNA CONCENTRATE AND DOCUSATE SODIUM 2 TABLET: 8.6; 5 TABLET, FILM COATED ORAL at 08:46

## 2017-10-31 RX ADMIN — ALPRAZOLAM 0.5 MG: 0.5 TABLET ORAL at 15:33

## 2017-10-31 RX ADMIN — TAZOBACTAM SODIUM AND PIPERACILLIN SODIUM 4.5 G: 500; 4 INJECTION, SOLUTION INTRAVENOUS at 03:39

## 2017-10-31 RX ADMIN — PRAVASTATIN SODIUM 20 MG: 20 TABLET ORAL at 08:46

## 2017-10-31 RX ADMIN — PREDNISONE 7 MG: 1 TABLET ORAL at 08:45

## 2017-10-31 RX ADMIN — MORPHINE SULFATE 4 MG: 4 INJECTION INTRAVENOUS at 13:37

## 2017-10-31 RX ADMIN — AMBRISENTAN 10 MG: 10 TABLET, FILM COATED ORAL at 11:38

## 2017-10-31 RX ADMIN — MORPHINE SULFATE 4 MG: 4 INJECTION INTRAVENOUS at 05:31

## 2017-10-31 RX ADMIN — ALPRAZOLAM 0.5 MG: 0.5 TABLET ORAL at 08:46

## 2017-10-31 RX ADMIN — TACROLIMUS 1.5 MG: 0.5 CAPSULE ORAL at 08:45

## 2017-10-31 RX ADMIN — LEVOTHYROXINE SODIUM 137 MCG: 137 TABLET ORAL at 06:14

## 2017-10-31 RX ADMIN — OMEPRAZOLE 40 MG: 20 CAPSULE, DELAYED RELEASE ORAL at 08:46

## 2017-10-31 RX ADMIN — MORPHINE SULFATE 4 MG: 4 INJECTION INTRAVENOUS at 07:24

## 2017-10-31 RX ADMIN — MORPHINE SULFATE 4 MG: 4 INJECTION INTRAVENOUS at 01:33

## 2017-10-31 RX ADMIN — GABAPENTIN 600 MG: 300 CAPSULE ORAL at 15:33

## 2017-10-31 ASSESSMENT — PAIN SCALES - GENERAL
PAINLEVEL_OUTOF10: 7
PAINLEVEL_OUTOF10: 5
PAINLEVEL_OUTOF10: 7

## 2017-10-31 ASSESSMENT — ENCOUNTER SYMPTOMS
NAUSEA: 0
MYALGIAS: 0
CHILLS: 0
DIARRHEA: 0
VOMITING: 0
FOCAL WEAKNESS: 0
ABDOMINAL PAIN: 1
COUGH: 0
WEAKNESS: 0
FEVER: 0

## 2017-10-31 NOTE — DISCHARGE SUMMARY
CHIEF COMPLAINT ON ADMISSION  Chief Complaint   Patient presents with   • Hypotension   • Fever       CODE STATUS  Full Code    HPI & HOSPITAL COURSE  This is a 57 y.o. female admitted 10/27/17 for fever, hypotension, sepsis.  Source appeared to be post operative surgical panniculectomy by Dr. Acuna in .  She has chronic immune suppression for liver transplant regarding sarcoidosis with liver failure.  She is on chronic narcotics with constipation.  She is s/p gastric bypass surgery with 100 pound weight loss.  She initially was admitted to the ICU, blood cultures negative x2.  She has 2 ANGIE drains from her panniculectomy surgery draining clear serosanguinous fluid.  A CT abdomen pelvis revealed a large post operative fluid collection in her lower abdominal wall with surgical drains in place.  She was placed initially on zosyn and vancomycin, then transitioned to amoxicillin and flagyl for 7 day course recommended by ID.  ID recommended repeat CT abdomen prior to surgical drain removal and/or follow up with her surgeon.  Her surgeon was contacted and made aware of her hospitalization.  She improved and post ICU day #1 was anxious to go home.  Her blood pressure normalized, ambulating well with FWW, post op surgical dressing in place, no drainage.    Therefore, she is discharged in good and stable condition with close outpatient follow-up.    SPECIFIC OUTPATIENT FOLLOW-UP  Follow up with PCP in 2 weeks.  Follow up discharge clinic, patient requesting for narcotic pain medications until her next PCP appt.  Narcotic check no results available.  Recommend follow up with Dr. Acuna, panicullectomy surgeon for drains and hematoma management.  He is aware of acute hospitalization.    DISCHARGE PROBLEM LIST  Principal Problem (Resolved):    Sepsis (CMS-HCC) POA: Yes  Active Problems:    Acute on chronic respiratory failure with hypoxia (CMS-HCC) POA: Yes    Status post liver transplant Dr. Canada ([facility]) POA:  Yes      Overview: -December 2011: Status post liver transplant for end stage liver disease       at [facility], [place] by Dr. Canada.          Pancytopenia (CMS-HCC) POA: Yes    Hepatopulmonary syndrome (CMS-HCC) POA: Yes    Immunocompromised state (CMS-HCC) POA: Yes    Hypothyroidism, adult POA: Yes    Sarcoidosis (CMS-HCC) POA: Yes    Pulmonary hypertension POA: Yes      Overview: Initial evaluation at [facility] pre liver transplant, PFO closed w/o       complication, 1/25/2011, then worse PAH and R HF post transplant,       extensive evaluations at [facility] [place] with cath and drug trial, responding       to tadalafil and ambrisentan dramatically. Followed there with serial R       heart cath.      3/25/2014: Most recent R heart cath done Dr. Brenda Flores with mild       pulmonary hypertension and relatively high ouput      November 2014: Echocardiogram with normal LV size, LVEF 60-65%. Mild MR,       mild AI, mild TR, RVSP 29-34mmHg.      February 2015: Echocardiogram with mild concentric LVH, LVEF 65-70%. Mild       MR, mild AI, trace TR, RVSP 16mmHg.    Hx of gastric bypass POA: Yes    Chronic use of opiate drugs therapeutic purposes POA: Yes    S/P panniculectomy POA: Yes  Resolved Problems:    Septic shock (CMS-HCC) POA: Yes    Hypotension POA: Yes    Sepsis (CMS-HCC) POA: Yes      FOLLOW UP  Future Appointments  Date Time Provider Department Center   11/2/2017 2:20 PM CARE MANAGER MARY KRISHNAN   11/8/2017 8:00 AM [facility] None   11/20/2017 12:40 PM NAHOMY Strauss   12/6/2017 8:00 AM [facility] None   1/3/2018 8:00 AM [facility] None   1/22/2018 12:00 PM [facility] Stra[facility]   2/7/2018 8:00 AM [facility] None   3/7/2018 8:00 AM [facility] None     No follow-up provider specified.    MEDICATIONS ON DISCHARGE   Roxana Cuba   Home Medication Instructions MORELIA:[id number]    Printed on:10/31/17 3944    Medication Information                      alprazolam (XANAX) 0.5 MG Tab  Take 1 Tab by mouth 3 times a day.             ambrisentan (LETAIRIS) 10 MG tablet  Take 1 Tab by mouth every day.             amoxicillin (AMOXIL) 500 MG Cap  Take 1 Cap by mouth 3 times a day for 3 days.             ascorbic acid (ASCORBIC ACID) 500 MG Tab  Take 500 mg by mouth every day.             CITRACAL MAXIMUM 315-250 MG-UNIT Tab  TAKE 2 TABS BY MOUTH 2X/ DAY WITH FOOD BEFORE AND AFTER DINNER FOR LIFE-CRUSH 1ST 3 MONTH, SPACE MVI             cyclobenzaprine (FLEXERIL) 10 MG Tab  Take 10 mg by mouth 3 times a day as needed for Muscle Spasms.             docusate sodium (COLACE) 100 MG Cap  Take 100 mg by mouth 2 times a day.             ferrous sulfate (FEOSOL) 220 (44 FE) MG/5ML Elixir  Take 5 mL by mouth every day.             fluticasone (FLONASE) 50 MCG/ACT nasal spray  SPRAY 1 SPRAY IN EACH NOSTRIL TWICE A DAY             gabapentin (NEURONTIN) 800 MG tablet  Take 1 Tab by mouth 3 times a day.             levothyroxine (SYNTHROID) 137 MCG Tab  Take 137 mcg by mouth Every morning on an empty stomach.             Linaclotide 290 MCG Cap  Take 290 mg by mouth every day.             metronidazole (FLAGYL) 500 MG Tab  Take 1 Tab by mouth every 8 hours for 3 days.             morphine (MS IR) 15 MG tablet  Take 1 Tab by mouth every four hours as needed for Severe Pain.             mycophenolate (CELLCEPT) 250 MG Cap  Take 500 mg by mouth 2 times a day.             Naloxegol Oxalate 25 MG Tab  Take 25 mg by mouth every day.             NON SPECIFIED  Take 1 Cap by mouth every evening. Bariatric Dietary Supplment             omeprazole (PRILOSEC) 40 MG delayed-release capsule  Take 1 Cap by mouth every day.             ondansetron (ZOFRAN ODT) 4 MG TABLET DISPERSIBLE  Take 1 Tab by mouth every 8 hours as needed for Nausea/Vomiting. Nausea and vomiting             oxycodone immediate release (ROXICODONE) 10 MG immediate release  tablet  Take 1-3 Tabs by mouth every 6 hours as needed for Moderate Pain.             polyethylene glycol 3350 (MIRALAX) Powder  Take 1 g by mouth every day.             pravastatin (PRAVACHOL) 20 MG Tab  TAKE 1 TAB BY MOUTH EVERY DAY.             predniSONE (DELTASONE) 1 MG Tab  Take 7 Tabs by mouth every day.             Probiotic Product (PROBIOTIC DAILY PO)  Take 1 Cap by mouth every day.             sennosides (SENOKOT) 8.6 MG Tab  Take 17.2 mg by mouth 2 times a day.             sertraline (ZOLOFT) 100 MG Tab  Take 100 mg by mouth every bedtime.             tacrolimus (PROGRAF) 0.5 MG Cap  Take 1.5 mg by mouth 2 Times a Day. Pt uses a 1 mg and 0.5 mg cap             Tadalafil, PAH, (ADCIRCA) 20 MG Tab  Take 40 mg by mouth every bedtime. Indications: Pulmonary Arterial Hypertension             tiotropium (SPIRIVA) 18 MCG Cap  Inhale 1 Cap by mouth every day.             trazodone (DESYREL) 50 MG Tab  TAKE 1-2 TABS BY MOUTH EVERY BEDTIME.             Wheat Dextrin (BENEFIBER) Powder  Take 3.8 g by mouth every day.                 DIET  Orders Placed This Encounter   Procedures   • Diet Order     Standing Status:   Standing     Number of Occurrences:   1     Order Specific Question:   Diet:     Answer:   Diabetic [3]       ACTIVITY  As tolerated.  Weight bearing as tolerated      CONSULTATIONS  ID/Dr. Boston.    PROCEDURES  none    LABORATORY  Lab Results   Component Value Date/Time    SODIUM 142 10/31/2017 05:12 AM    POTASSIUM 3.5 (L) 10/31/2017 05:12 AM    CHLORIDE 110 10/31/2017 05:12 AM    CO2 29 10/31/2017 05:12 AM    GLUCOSE 162 (H) 10/31/2017 05:12 AM    BUN 10 10/31/2017 05:12 AM    CREATININE 1.03 10/31/2017 05:12 AM        Lab Results   Component Value Date/Time    WBC 2.8 (L) 10/31/2017 05:12 AM    HEMOGLOBIN 8.0 (L) 10/31/2017 05:12 AM    HEMATOCRIT 24.5 (L) 10/31/2017 05:12 AM    PLATELETCT 61 (L) 10/31/2017 05:12 AM        Total time of the discharge process exceeds 45 minutes

## 2017-10-31 NOTE — PROGRESS NOTES
Report received, assumed pt care, assessment complete. VSS, A&O X4, no complaints of pain. Discussed POC, pt verbalizes understanding. No further concerns at this time. Bed in low position, bed alarm on, call light in reach.

## 2017-10-31 NOTE — PROGRESS NOTES
Pulmonary Critical Care Progress Note            History of Present Illness:  The patient is a 57-year-old woman, who I have   seen in the past last in 2016 for acute-on-chronic hypoxemic respiratory   arrest with possible atypical infection/HCAP including possibility of fungal   infection.  She clinically improved and has been working on weight loss.  She   has LUCIUS and failed to ever tolerate CPAP, although did not give much of a   trial to CPAP at home unfortunately.  She was successful, though with dramatic   weight loss in the greater than 100-pound range per her history.  She   developed some loose pannus that was problematic and saw a surgeon in the [place] area and had a panniculectomy done 3 days ago.  She was sent home   from her perspective as a prematurely and has borderline blood pressures   postop.  She presented to the hospital and was admitted by Dr. Oleary yesterday   for what she was concerned for, which was sepsis.  She reported symptoms of   fever, chills, shortness of breath, and malaise.  She tells me she can always   tell when she is septic.  She has done a good job over the years with her   immunocompromised transplant made for her liver transplantation and has been   careful with infection control.  She, from a respiratory standpoint, is   actually doing okay.  She is not using respiratory med except for the oxygen   now.  She does have occasional cough and wheeze, but very rarely uses her   albuterol rescue inhaler.  Spiriva inhalation once a day may be helping her,   she is not sure.  She follows with Dr. Gaming in the [facility] office.    Her LUCIUS symptoms have improved with the weight loss.  She is up-to-date with   vaccines as clinically appropriate per her transplant physician she stated.    She is not on any therapy for fungal infection at this time.  Her symptoms   have improved since arrival and is receiving fluid boluses and being started   on antibiotics.  She denies any  chest pain or hemoptysis today.  Oxygen   requirements are little up from her baseline, 4 liters at home.  She is on 5   or 6 liters here, but oxygen saturations are acceptable.  Blood pressure has   been borderline in the  range.  She is not on pressors intravenously at   the time I saw her.  Cultures are negative so far this admission.  She denies   significant drainage from her incision, but the left side gives her pain, and   it radiates around her back, it radiates down her groin.  No history of kidney   stones, and no dysuria or hematuria.  Did a cortisol level last night of 1.2   and is on chronic steroids.  Excess steroids do cause her to have significant   side effects.  She is placed on low-dose supplementation of her Deltasone   orally.  Her antirejection medications including CellCept, prednisone, and   tacrolimus have been continued.    ROS:    Respiratory: negative cough, negative hemoptysis, negative pleuritic chest pain, negative shortness of breath, negative sputum production and negative wheezing,   Cardiac: negative chest pain and negative palpitations,   GI: negative nausea, negative vomiting and negative abdominal pain.     Interval Events:  24 hour interval history reviewed   Reason for visit:  Acute on chronic RF, sepsis vs SIRS, hepatopulmonary syndrome, obstructive lung disease, sarcoidosis, LUCIUS    10/29 - Following well  5 lpm - 99% - uses 5 lpm at home - a bit reluctant  IS 2000  ID to see today  Vanco/Zosyn  Glucose noted  CXR no film today    10/30 - afebrile overnight, on 5 LPM which is her home therapy, lowish BP, minus 250 ml last 24 hours, today's labs reveal similar pancytopenia, hypokalemia, hyperglycemia, hypoalbuminemia, hypoglobulinemia, 10/27 BC negative, cxr today shows bibasilar opacities without significant change; 10/27 CT: 1.  Postoperative change of the lower abdominal wall with large hyperdense fluid collection consistent with hematoma.  Surgical drains are  present.  No evidence to indicate ongoing arterial hemorrhage.  2.  No intra-abdominal inflammatory changes, peritoneal fluid or pneumothorax.  3.  Mild bibasilar atelectasis.  4.  Increased colonic fluid and stool   Remains on zosyn, prograf, cellcept, prednisone.    10/31 - on her home liter flow of 5 lpm; afebrile overnight, no interval events; pancytopenia about the same on today's labs, no leukocytosis; hypokalemia continues unchanged, pcm, low globulins; no new micro; cxr yesterday showed no change in the L > R  bi-basilar opacities; remains afebrile; insomnia 2/2 higher dose of prednisone; wants to go home today and can go from pulmonary point of view; getting her last dose of abx; last PFTS show an FEV1 of 60%, might qualify for pulmonary rehab but would need updated PFTS, she will d/w pulmonary when she RTC.    PFSH:  No change.    Physical Exam:  Appearance: Well-nourished, well-developed, no acute distress; msew  Eyes:  PERRLA, EOMI  Hearing:  Grossly intact  Nose:  Normal, no lesions or deformities, turbinates moist  Oropharynx:  Tongue normal, posterior pharynx without erythema or exudate  Mallampati classification:    Neck: Supple, trachea midline, no masses  Respiratory effort:  No intercostal retractions or use of accessory muscles  Lung auscultation:  No wheezes rhonchi rubs or rales  Cardiac auscultation:  No murmurs, rubs, or gallops, no regular rhythm, normal rate  Abdomen:  Surgical site noted, drain in place  Extremities:  No cyanosis, clubbing, edema  Digits and nails: No clubbing, cyanosis, petechiae, or nodes  Musculoskeletal:  Grossly normal  Skin:  No rashes  Orientation:  Oriented time, place, and person  Mood and affect:  No depression, anxiety, agitation  Judgment:  Intact          Respiratory:     Pulse Oximetry: 96 %   NC O2 5 lpm - home O2 5 lpm  ABD Drains:                    Invalid input(s): [id number]    HemoDynamics:  Pulse: 64  Blood Pressure: 109/59       Exam: regular  rhythm (Sinus), no ectopy, no edema  Imaging: Available data reviewed        Neuro:  GCS         Exam: no focal deficits noted mental status intact  Imaging: Available data reviewed    Fluids:  Intake/Output       10/29/17 0700 - 10/30/17 0659 10/30/17 0700 - 10/31/17 0659 10/31/17 0700 - 17 0659      [id number] 9835-3130 Total 5835-7211 2293-3998 Total 6451-0854 6916-5539 Total       Intake    P.O.  150  350 500  240  -- 240  240  -- 240    P.O. 150 350 500 240 -- 240 240 -- 240    Total Intake 150 350 500 240 -- 240 240 -- 240       Output    Urine  750  -- 750  --  -- --  --  -- --    Number of Times Voided 3 x 1 x 4 x 1 x -- 1 x -- -- --    Void (ml) 750 -- 750 -- -- -- -- -- --    Stool  --  -- --  --  -- --  --  -- --    Number of Times Stooled 2 x -- 2 x -- -- -- -- -- --    Total Output 750 -- 750 -- -- -- -- -- --       Net I/O     -600 350 -250 240 -- 240 240 -- 240           Recent Labs      10/29/17   0330  10/30/17   0450  10/31/17   0512   SODIUM  142  141  142   POTASSIUM  3.9  3.5*  3.5*   CHLORIDE  110  109  110   CO2  26  28  29   BUN  10  9  10   CREATININE  0.87  0.97  1.03   CALCIUM  7.7*  8.0*  8.2*       GI/Nutrition:  Exam: normal active bowel sounds, diffusely tender, lower abdominal percussion tenderness  Imaging: Available data reviewed  taking PO  Liver Function  Recent Labs      10/29/17   0330  10/30/17   0450  10/31/17   0512   ALTSGPT  7  6  6   ASTSGOT  12  10*  10*   ALKPHOSPHAT  19*  15*  16*   TBILIRUBIN  0.3  0.3  0.3   GLUCOSE  190*  167*  162*       Heme:  Recent Labs      10/29/17   0330  10/30/17   0450  10/31/17   05   RBC  2.93*  2.69*  2.67*   HEMOGLOBIN  8.6*  8.1*  8.0*   HEMATOCRIT  26.2*  24.8*  24.5*   PLATELETCT  56*  54*  61*       Infectious Disease:  Temp  Av.8 °C (98.2 °F)  Min: 36.3 °C (97.4 °F)  Max: 37.2 °C (98.9 °F)  Micro: antibiotics reviewed and cultures pending  Recent Labs      10/29/17   0330  10/30/17   0450  10/31/17   0512   WBC  2.9*   2.9*  2.8*   NEUTSPOLYS  81.50*  53.70  51.50   LYMPHOCYTES  11.00*  30.30  31.50   MONOCYTES  5.50  9.10  9.80   EOSINOPHILS  1.70  6.30  5.80   BASOPHILS  0.00  0.30  0.70   ASTSGOT  12  10*  10*   ALTSGPT  7  6  6   ALKPHOSPHAT  19*  15*  16*   TBILIRUBIN  0.3  0.3  0.3     Current Facility-Administered Medications   Medication Dose Frequency Provider Last Rate Last Dose   • predniSONE (DELTASONE) tablet 7 mg  7 mg DAILY Josemanuel Real M.D.   7 mg at 10/31/17 0845   • trazodone (DESYREL) tablet 100 mg  100 mg QHS PRN Josemanuel Real M.D.   100 mg at 10/30/17 2218   • alprazolam (XANAX) tablet 0.5 mg  0.5 mg TID Josemanuel Real M.D.   0.5 mg at 10/31/17 0846   • fluticasone (FLONASE) nasal spray 100 mcg  2 Spray BID Josemanuel Real M.D.   100 mcg at 10/31/17 0846   • sertraline (ZOLOFT) tablet 100 mg  100 mg QHS Josemanuel Real M.D.   100 mg at 10/30/17 2138   • oxycodone immediate-release (ROXICODONE) tablet 5 mg  5 mg Q4HRS PRN Josemanuel Real M.D.        Or   • oxycodone immediate release (ROXICODONE) tablet 10 mg  10 mg Q4HRS PRN Josemanuel Real M.D.   10 mg at 10/30/17 1214   • morphine (pf) 4 mg/ml injection 1-4 mg  1-4 mg Q2HRS PRN Josemanuel Real M.D.   4 mg at 10/31/17 0942   • tiotropium (SPIRIVA) 18 MCG inhalation capsule 1 Cap  1 Cap DAILY Racquel Oleary M.D.   1 Cap at 10/31/17 0845   • Tadalafil (PAH) TABS 40 mg  40 mg QHS Racquel Oleary M.D.   40 mg at 10/30/17 2100   • tacrolimus (PROGRAF) capsule 1.5 mg  1.5 mg BID Racquel Oleary M.D.   1.5 mg at 10/31/17 0845   • pravastatin (PRAVACHOL) tablet 20 mg  20 mg DAILY Racquel Oleary M.D.   20 mg at 10/31/17 0846   • omeprazole (PRILOSEC) capsule 40 mg  40 mg DAILY Racquel Oleary M.D.   40 mg at 10/31/17 0846   • mycophenolate (CELLCEPT) capsule 500 mg  500 mg BID Racquel Oleary M.D.   500 mg at 10/31/17 0846   • levothyroxine (SYNTHROID) tablet 137 mcg  137 mcg AM ES Racquel Oleary M.D.   137 mcg at 10/31/17 0614   • gabapentin (NEURONTIN) capsule 600  mg  600 mg TID Racquel Oleary M.D.   600 mg at 10/31/17 0846   • cyclobenzaprine (FLEXERIL) tablet 10 mg  10 mg TID PRN Racquel Oleary M.D.       • NS infusion 2,355 mL  30 mL/kg Once PRN Racquel Oleary M.D.       • senna-docusate (PERICOLACE or SENOKOT S) 8.6-50 MG per tablet 2 Tab  2 Tab BID Racquel Oleary M.D.   2 Tab at 10/31/17 0846    And   • polyethylene glycol/lytes (MIRALAX) PACKET 1 Packet  1 Packet QDAY PRN Racquel Oleary M.D.   1 Packet at 10/29/17 0802    And   • magnesium hydroxide (MILK OF MAGNESIA) suspension 30 mL  30 mL QDAY PRN Racquel Oleary M.D.   Stopped at 10/28/17 0900    And   • bisacodyl (DULCOLAX) suppository 10 mg  10 mg QDAY PRN Racquel Oleary M.D.   10 mg at 10/29/17 0801   • Respiratory Care per Protocol   Continuous RT Racquel Oleary M.D.       • insulin lispro (HUMALOG) injection 1-6 Units  1-6 Units 4X/DAY ACHS Racquel Oleary M.D.   1 Units at 10/31/17 0622   • glucose 4 g chewable tablet 16 g  16 g Q15 MIN PRN Racquel Oleary M.D.        And   • dextrose 50% (D50W) injection 25 mL  25 mL Q15 MIN PRN Racquel Oleary M.D.       • ondansetron (ZOFRAN) syringe/vial injection 4 mg  4 mg Q4HRS PRN Racquel Oleary M.D.   4 mg at 10/30/17 2218   • ondansetron (ZOFRAN ODT) dispertab 4 mg  4 mg Q4HRS PRN Racquel Oleary M.D.   4 mg at 10/29/17 1509   • promethazine (PHENERGAN) tablet 12.5-25 mg  12.5-25 mg Q4HRS PRN Racquel Oleary M.D.       • promethazine (PHENERGAN) suppository 12.5-25 mg  12.5-25 mg Q4HRS PRN Racquel Oleary M.D.       • prochlorperazine (COMPAZINE) injection 5-10 mg  5-10 mg Q4HRS PRN Racquel Oleary M.D.       • piperacillin-tazobactam (ZOSYN) IVPB premix 4.5 g  4.5 g Q6HRS Racquel Oleary M.D.   4.5 g at 10/31/17 0851   • ambrisentan (LETAIRIS) TABS 10 mg  10 mg DAILY Racquel Oleary M.D.   10 mg at 10/30/17 1211     Last reviewed on 10/27/2017  3:44 PM by Maryann Belcher R.N.    Quality  Measures:  Labs reviewed, Medications reviewed and Radiology images  reviewed              Antibiotics: Treating active infection/contamination beyond 24 hours perioperative coverage  Assessed for rehab: Patient was assess for and/or received rehabilitation services during this hospitalization      Problems/plan:  Hypokalemia  PCM  Acute-on-chronic respiratory failure with mildly increased O2 requirements over baseline.   O2 protocols   Titrate to >=92-94% - may be able to wean O2 - patient relcutant  Febrile illness, admitted with certainly systemic inflammatory response syndrome, possible sepsis, query related to her recent abdominal surgery for resection of her pannus.  Wound is not exceptionally abnormal consistent with infectious process; CT abd/pelvis is underwhelming for a source;  however, the patient is clearly immunocompromised.   IV Vanco/Zosyn   ID to see today   Cultures pending   Doubt Pulm source  Status post liver transplantation approximately 6 years ago for hepatopulmonary syndrome, immunocompromised with CellCept, tacrolimus, and prednisone chronically.  History of prior recurrent infections times many.   For now continue rejection meds  Sarcoidosis with chronic obstructive lung disease and abnormal chest x-ray.   Monitor   Spiriva   RT protocols - PRN nebs  Pulmonary hypertension, improved, now mild, last echocardiogram this year with RVSP 35, currently on tadalafil  and ambrisentan, followed by Dr. Gaming in the [facility].   Continue current meds   F/u ECHO with doppler PRN  Sleep apnea, failed obstructive sleep apnea treatment with CPAP in the past, but has successfully lost greater than 100 pounds, status post panniculectomy.   No S/s since dropping > 100#!   Recommend a home sleep study on RA after dc to document current LUCIUS situation/resolution  Chronic thrombocytopenia secondary to above.  No evidence of bleeding at this time.   Serial CBC - transfuse PRN  Chronic pain syndrome, on narcotics, being managed by Dr. Real at this  time.  Hypothyroidism, on replacement - Synthroid  History of gastroesophageal reflux disease, on therapy - PPI  History of diabetes type 2, on therapy - SSI  Chronic kidney disease secondary to above.  Lifelong nonsmoker.  Low cortisol level, chronically on steroids.  Prophylaxis     OK for home from our perspective

## 2017-10-31 NOTE — DISCHARGE INSTRUCTIONS
Discharge Instructions    Discharged to home by car with relative. Discharged via wheelchair, hospital escort: Yes.  Special equipment needed: walker    Be sure to schedule a follow-up appointment with your primary care doctor or any specialists as instructed.     Discharge Plan:   Influenza Vaccine Indication: Patient Refuses (see in admission profile)    I understand that a diet low in cholesterol, fat, and sodium is recommended for good health. Unless I have been given specific instructions below for another diet, I accept this instruction as my diet prescription.   Other diet: Diabetic    Special Instructions: Sepsis, Adult  Sepsis is a serious infection of your blood or tissues that affects your whole body. The infection that causes sepsis may be bacterial, viral, fungal, or parasitic. Sepsis may be life threatening. Sepsis can cause your blood pressure to drop. This may result in shock. Shock causes your central nervous system and your organs to stop working correctly.   RISK FACTORS  Sepsis can happen in anyone, but it is more likely to happen in people who have weakened immune systems.  SIGNS AND SYMPTOMS   Symptoms of sepsis can include:  · Fever or low body temperature (hypothermia).  · Rapid breathing (hyperventilation).  · Chills.  · Rapid heartbeat (tachycardia).  · Confusion or light-headedness.  · Trouble breathing.  · Urinating much less than usual.  · Cool, clammy skin or red, flushed skin.  · Other problems with the heart, kidneys, or brain.  DIAGNOSIS   Your health care provider will likely do tests to look for an infection, to see if the infection has spread to your blood, and to see how serious your condition is. Tests can include:  · Blood tests, including cultures of your blood.  · Cultures of other fluids from your body, such as:  ¨ Urine.  ¨ Pus from wounds.  ¨ Mucus coughed up from your lungs.  · Urine tests other than cultures.  · X-ray exams or other imaging tests.  TREATMENT   Treatment  will begin with elimination of the source of infection. If your sepsis is likely caused by a bacterial or fungal infection, you will be given antibiotic or antifungal medicines.  You may also receive:  · Oxygen.  · Fluids through an IV tube.  · Medicines to increase your blood pressure.  · A machine to clean your blood (dialysis) if your kidneys fail.  · A machine to help you breathe if your lungs fail.  SEEK IMMEDIATE MEDICAL CARE IF:  You get an infection or develop any of the signs and symptoms of sepsis after surgery or a hospitalization.     This information is not intended to replace advice given to you by your health care provider. Make sure you discuss any questions you have with your health care provider.     Document Released: 09/15/2004 Document Revised: 05/03/2016 Document Reviewed: 08/25/2014  [employer] Interactive Patient Education ©2016 [employer]      · Is patient discharged on Warfarin / Coumadin?   No     · Is patient Post Blood Transfusion?  No    Depression / Suicide Risk    As you are discharged from [facility], it is important to learn how to keep safe from harming yourself.    Recognize the warning signs:  · Abrupt changes in personality, positive or negative- including increase in energy   · Giving away possessions  · Change in eating patterns- significant weight changes-  positive or negative  · Change in sleeping patterns- unable to sleep or sleeping all the time   · Unwillingness or inability to communicate  · Depression  · Unusual sadness, discouragement and loneliness  · Talk of wanting to die  · Neglect of personal appearance   · Rebelliousness- reckless behavior  · Withdrawal from people/activities they love  · Confusion- inability to concentrate     If you or a loved one observes any of these behaviors or has concerns about self-harm, here's what you can do:  · Talk about it- your feelings and reasons for harming yourself  · Remove any means that you might use to  hurt yourself (examples: pills, rope, extension cords, firearm)  · Get professional help from the community (Mental Health, Substance Abuse, psychological counseling)  · Do not be alone:Call your Safe Contact- someone whom you trust who will be there for you.  · Call your local CRISIS HOTLINE 346-8347 or 656-786-4451  · Call your local Children's Mobile Crisis Response Team [place] (912) 610-7931 or www.[employer]  · Call the toll free National Suicide Prevention Hotlines   · National Suicide Prevention Lifeline 285-391-JPTO (2627)  · National Hope Line Network 800-SUICIDE (382-9217)    Hematoma  A hematoma is a collection of blood under the skin, in an organ, in a body space, in a joint space, or in other tissue. The blood can clot to form a lump that you can see and feel. The lump is often firm and may sometimes become sore and tender. Most hematomas get better in a few days to weeks. However, some hematomas may be serious and require medical care. Hematomas can range in size from very small to very large.  CAUSES   A hematoma can be caused by a blunt or penetrating injury. It can also be caused by spontaneous leakage from a blood vessel under the skin. Spontaneous leakage from a blood vessel is more likely to occur in older people, especially those taking blood thinners. Sometimes, a hematoma can develop after certain medical procedures.  SIGNS AND SYMPTOMS   · A firm lump on the body.  · Possible pain and tenderness in the area.  · Bruising. Blue, dark blue, purple-red, or yellowish skin may appear at the site of the hematoma if the hematoma is close to the surface of the skin.  For hematomas in deeper tissues or body spaces, the signs and symptoms may be subtle. For example, an intra-abdominal hematoma may cause abdominal pain, weakness, fainting, and shortness of breath. An intracranial hematoma may cause a headache or symptoms such as weakness, trouble speaking, or a change in  consciousness.  DIAGNOSIS   A hematoma can usually be diagnosed based on your medical history and a physical exam. Imaging tests may be needed if your health care provider suspects a hematoma in deeper tissues or body spaces, such as the abdomen, head, or chest. These tests may include ultrasonography or a CT scan.   TREATMENT   Hematomas usually go away on their own over time. Rarely does the blood need to be drained out of the body. Large hematomas or those that may affect vital organs will sometimes need surgical drainage or monitoring.  HOME CARE INSTRUCTIONS   · Apply ice to the injured area:    ¨ Put ice in a plastic bag.    ¨ Place a towel between your skin and the bag.    ¨ Leave the ice on for 20 minutes, 2-3 times a day for the first 1 to 2 days.    · After the first 2 days, switch to using warm compresses on the hematoma.    · Elevate the injured area to help decrease pain and swelling. Wrapping the area with an elastic bandage may also be helpful. Compression helps to reduce swelling and promotes shrinking of the hematoma. Make sure the bandage is not wrapped too tight.    · If your hematoma is on a lower extremity and is painful, crutches may be helpful for a couple days.    · Only take over-the-counter or prescription medicines as directed by your health care provider.  SEEK IMMEDIATE MEDICAL CARE IF:   · You have increasing pain, or your pain is not controlled with medicine.    · You have a fever.    · You have worsening swelling or discoloration.    · Your skin over the hematoma breaks or starts bleeding.    · Your hematoma is in your chest or abdomen and you have weakness, shortness of breath, or a change in consciousness.  · Your hematoma is on your scalp (caused by a fall or injury) and you have a worsening headache or a change in alertness or consciousness.  MAKE SURE YOU:   · Understand these instructions.  · Will watch your condition.  · Will get help right away if you are not doing well or get  worse.     This information is not intended to replace advice given to you by your health care provider. Make sure you discuss any questions you have with your health care provider.     Document Released: 08/01/2005 Document Revised: 08/20/2014 Document Reviewed: 05/28/2014  [employer] Interactive Patient Education ©2016 [employer]  Sepsis, Adult  Sepsis is a serious infection of your blood or tissues that affects your whole body. The infection that causes sepsis may be bacterial, viral, fungal, or parasitic. Sepsis may be life threatening. Sepsis can cause your blood pressure to drop. This may result in shock. Shock causes your central nervous system and your organs to stop working correctly.   RISK FACTORS  Sepsis can happen in anyone, but it is more likely to happen in people who have weakened immune systems.  SIGNS AND SYMPTOMS   Symptoms of sepsis can include:  · Fever or low body temperature (hypothermia).  · Rapid breathing (hyperventilation).  · Chills.  · Rapid heartbeat (tachycardia).  · Confusion or light-headedness.  · Trouble breathing.  · Urinating much less than usual.  · Cool, clammy skin or red, flushed skin.  · Other problems with the heart, kidneys, or brain.  DIAGNOSIS   Your health care provider will likely do tests to look for an infection, to see if the infection has spread to your blood, and to see how serious your condition is. Tests can include:  · Blood tests, including cultures of your blood.  · Cultures of other fluids from your body, such as:  ¨ Urine.  ¨ Pus from wounds.  ¨ Mucus coughed up from your lungs.  · Urine tests other than cultures.  · X-ray exams or other imaging tests.  TREATMENT   Treatment will begin with elimination of the source of infection. If your sepsis is likely caused by a bacterial or fungal infection, you will be given antibiotic or antifungal medicines.  You may also receive:  · Oxygen.  · Fluids through an IV tube.  · Medicines to increase your blood  pressure.  · A machine to clean your blood (dialysis) if your kidneys fail.  · A machine to help you breathe if your lungs fail.  SEEK IMMEDIATE MEDICAL CARE IF:  You get an infection or develop any of the signs and symptoms of sepsis after surgery or a hospitalization.     This information is not intended to replace advice given to you by your health care provider. Make sure you discuss any questions you have with your health care provider.     Document Released: 09/15/2004 Document Revised: 05/03/2016 Document Reviewed: 08/25/2014  [employer] Interactive Patient Education ©2016 [employer]

## 2017-10-31 NOTE — PROGRESS NOTES
Infectious Disease Progress Note    Author: Jaida Epps M.D.Date & Time created: 10/31/2017  12:44 PM    Interval History:  Zosyn s/p 3 days.  Now on oral therapy    Previously had Vanco X 3 days    10/30: No acute issues. No fevers. Tolerating abx well. Pains remain to surgical wound site. Drains in place without erythema. Pulm saw and titrating O2.    10/31: Pt doing well.  States going home today.  Labs Reviewed, Medications Reviewed, Radiology Reviewed, Wound Reviewed, Fluids Reviewed and GI Nutrition Reviewed    Review of Systems:  Review of Systems   Constitutional: Negative for chills, fever and malaise/fatigue.   Respiratory: Negative for cough.    Cardiovascular: Negative for chest pain.   Gastrointestinal: Positive for abdominal pain (At surgical wound site). Negative for diarrhea, nausea and vomiting.   Genitourinary: Negative for dysuria.   Musculoskeletal: Negative for myalgias.   Skin: Negative for rash.   Neurological: Negative for focal weakness and weakness.       Physical Exam:  Physical Exam   Constitutional: She is oriented to person, place, and time. She appears well-developed and well-nourished.   Cardiovascular: Normal rate and regular rhythm.    No murmur heard.  Pulmonary/Chest: Effort normal and breath sounds normal. No respiratory distress.   Abdominal: Soft. Bowel sounds are normal. There is tenderness (Surgical site. Not at entry of drains.).   Incision with dressing in place.  No erythema. Drains in place.   Musculoskeletal: She exhibits no edema.   Neurological: She is alert and oriented to person, place, and time. No cranial nerve deficit.   Skin: Skin is warm and dry. No rash noted.   Psychiatric: She has a normal mood and affect.       Labs:  Recent Results (from the past 24 hour(s))   ACCU-CHEK GLUCOSE    Collection Time: 10/30/17  5:49 PM   Result Value Ref Range    Glucose - Accu-Ck 178 (H) 65 - 99 mg/dL   ACCU-CHEK GLUCOSE    Collection Time: 10/30/17  9:53 PM   Result Value  Ref Range    Glucose - Accu-Ck 140 (H) 65 - 99 mg/dL   CBC with Differential    Collection Time: 10/31/17  5:12 AM   Result Value Ref Range    WBC 2.8 (L) 4.8 - 10.8 K/uL    RBC 2.67 (L) 4.20 - 5.40 M/uL    Hemoglobin 8.0 (L) 12.0 - 16.0 g/dL    Hematocrit 24.5 (L) 37.0 - 47.0 %    MCV 91.8 81.4 - 97.8 fL    MCH 30.0 27.0 - 33.0 pg    MCHC 32.7 (L) 33.6 - 35.0 g/dL    RDW 46.0 35.9 - 50.0 fL    Platelet Count 61 (L) 164 - 446 K/uL    MPV 9.3 9.0 - 12.9 fL    Neutrophils-Polys 51.50 44.00 - 72.00 %    Lymphocytes 31.50 22.00 - 41.00 %    Monocytes 9.80 0.00 - 13.40 %    Eosinophils 5.80 0.00 - 6.90 %    Basophils 0.70 0.00 - 1.80 %    Immature Granulocytes 0.70 0.00 - 0.90 %    Nucleated RBC 0.00 /100 WBC    Neutrophils (Absolute) 1.42 (L) 2.00 - 7.15 K/uL    Lymphs (Absolute) 0.87 (L) 1.00 - 4.80 K/uL    Monos (Absolute) 0.27 0.00 - 0.85 K/uL    Eos (Absolute) 0.16 0.00 - 0.51 K/uL    Baso (Absolute) 0.02 0.00 - 0.12 K/uL    Immature Granulocytes (abs) 0.02 0.00 - 0.11 K/uL    NRBC (Absolute) 0.00 K/uL   Comp Metabolic Panel (CMP)    Collection Time: 10/31/17  5:12 AM   Result Value Ref Range    Sodium 142 135 - 145 mmol/L    Potassium 3.5 (L) 3.6 - 5.5 mmol/L    Chloride 110 96 - 112 mmol/L    Co2 29 20 - 33 mmol/L    Anion Gap 3.0 0.0 - 11.9    Glucose 162 (H) 65 - 99 mg/dL    Bun 10 8 - 22 mg/dL    Creatinine 1.03 0.50 - 1.40 mg/dL    Calcium 8.2 (L) 8.5 - 10.5 mg/dL    AST(SGOT) 10 (L) 12 - 45 U/L    ALT(SGPT) 6 2 - 50 U/L    Alkaline Phosphatase 16 (L) 30 - 99 U/L    Total Bilirubin 0.3 0.1 - 1.5 mg/dL    Albumin 2.8 (L) 3.2 - 4.9 g/dL    Total Protein 4.5 (L) 6.0 - 8.2 g/dL    Globulin 1.7 (L) 1.9 - 3.5 g/dL    A-G Ratio 1.6 g/dL   ESTIMATED GFR    Collection Time: 10/31/17  5:12 AM   Result Value Ref Range    GFR If African American >60 >60 mL/min/1.73 m 2    GFR If Non African American 55 (A) >60 mL/min/1.73 m 2   ACCU-CHEK GLUCOSE    Collection Time: 10/31/17  6:14 AM   Result Value Ref Range    Glucose -  "Accu-Ck 155 (H) 65 - 99 mg/dL     Results     Procedure Component Value Units Date/Time    URINE CULTURE(NEW) [[id number]] Collected:  10/27/17 1200    Order Status:  Completed Specimen:  Urine Updated:  10/29/17 0828     Significant Indicator NEG     Source UR     Site --     Urine Culture No growth at 48 hours    Narrative:       Indication for culture:->Emergency Room Patient    BLOOD CULTURE [[id number]] Collected:  10/27/17 1123    Order Status:  Completed Specimen:  Blood from Peripheral Updated:  10/28/17 0830     Significant Indicator NEG     Source BLD     Site PERIPHERAL     Blood Culture --     No Growth    Note: Blood cultures are incubated for 5 days and  are monitored continuously.Positive blood cultures  are called to the RN and reported as soon as  they are identified.      Narrative:       Per Hospital Policy: Only change Specimen Src: to \"Line\" if  specified by physician order.    BLOOD CULTURE [[id number]] Collected:  10/27/17 1036    Order Status:  Completed Specimen:  Blood from Peripheral Updated:  10/28/17 0830     Significant Indicator NEG     Source BLD     Site PERIPHERAL     Blood Culture --     No Growth    Note: Blood cultures are incubated for 5 days and  are monitored continuously.Positive blood cultures  are called to the RN and reported as soon as  they are identified.      Narrative:       Per Hospital Policy: Only change Specimen Src: to \"Line\" if  specified by physician order.    URINALYSIS [[id number]] Collected:  10/27/17 1200    Order Status:  Completed Specimen:  Urine Updated:  10/27/17 1212     Color Yellow     Character Clear     Specific Gravity 1.017     Ph 6.0     Glucose Negative mg/dL      Ketones Negative mg/dL      Protein Negative mg/dL      Bilirubin Negative     Urobilinogen, Urine 0.2     Nitrite Negative     Leukocyte Esterase Negative     Occult Blood Negative     Micro Urine Req see below     Comment: Microscopic examination not performed when specimen is " clear  and chemically negative for protein, blood, leukocyte esterase  and nitrite.         Narrative:       Indication for culture:->Emergency Room Patient    Blood Culture [[id number]] Collected:  10/27/17 0000    Order Status:  Canceled Specimen:  Other from Peripheral     Blood Culture [[id number]] Collected:  10/27/17 0000    Order Status:  Canceled Specimen:  Other from Peripheral     Culture Respiratory W/ GRM STN [[id number]] Collected:  10/27/17 0000    Order Status:  Canceled Specimen:  Sputum from Sputum     Urine Culture [[id number]] Collected:  10/27/17 0000    Order Status:  Canceled Specimen:  Other from Urine, Clean Catch         Hemodynamics:  Temp (24hrs), Av.8 °C (98.2 °F), Min:36.3 °C (97.4 °F), Max:37.2 °C (98.9 °F)  Temperature: 37.2 °C (98.9 °F)  Pulse  Av.5  Min: 58  Max: 93   Blood Pressure: 117/60     PIV Group Left Forearm 20g Butterfly (Active)   Line Secured Taped;Transparent 10/30/2017  7:30 PM   Site Condition / Description Assessed;Patent;Clean;Dry 10/30/2017  7:30 PM   Dressing Type / Description Transparent;Clean;Dry;Intact 10/30/2017  7:30 PM   Dressing Status Observed 10/30/2017  7:30 PM   Saline Locked Yes 10/30/2017  8:00 AM   Infiltration Grading Used by [facility] 0 10/30/2017  7:30 PM   Phlebitis Scale (Used by [facility]) 0 10/30/2017  7:30 PM   Date Primary Tubing Changed 10/28/17 10/30/2017  7:30 PM   Date Secondary Tubing Changed 10/30/17 10/30/2017  7:30 PM   NEXT Primary Tubing Change  10/31/17 10/30/2017  7:30 PM   NEXT Secondary Tubing Change  10/31/17 10/30/2017  7:30 PM     Wound:  Surgical Incision  Incision Bilateral Anterior;Lower Abdomen (Active)   Wound Bed Pink 10/28/2017  8:00 PM   Drainage  None 10/30/2017  7:30 PM   Periwound Skin Pink;Normal;Intact 10/30/2017  7:30 PM   Daily - Wound Closure Closure Strips 10/30/2017  7:30 PM   Dressing Options Dry Gauze 10/30/2017  7:30 PM   Dressing Status / Change Clean;Dry;Intact;Observed 10/30/2017  7:30 PM    Dressing Change Frequency Every 48 hrs 10/30/2017  7:30 PM   Next Dressing Change  10/28/17 10/27/2017  8:00 PM        Fluids:  Intake/Output       10/29/17 0700 - 10/30/17 0659 10/30/17 0700 - 10/31/17 0659 10/31/17 0700 - 11/01/17 0659      [id number] 6960-0082 Total 1342-7104 1343-8771 Total 8795-5461 2456-5668 Total       Intake    P.O.  150  350 500  240  -- 240  240  -- 240    P.O. 150 350 500 240 -- 240 240 -- 240    Total Intake 150 350 500 240 -- 240 240 -- 240       Output    Urine  750  -- 750  --  -- --  --  -- --    Number of Times Voided 3 x 1 x 4 x 1 x -- 1 x -- -- --    Void (ml) 750 -- 750 -- -- -- -- -- --    Stool  --  -- --  --  -- --  --  -- --    Number of Times Stooled 2 x -- 2 x -- -- -- -- -- --    Total Output 750 -- 750 -- -- -- -- -- --       Net I/O     -600 350 -250 240 -- 240 240 -- 240           GI/Nutrition:  Orders Placed This Encounter   Procedures   • Diet Order     Standing Status:   Standing     Number of Occurrences:   1     Order Specific Question:   Diet:     Answer:   Diabetic [3]     Medications:  Current Facility-Administered Medications   Medication Last Dose   • predniSONE (DELTASONE) tablet 7 mg 7 mg at 10/31/17 0845   • trazodone (DESYREL) tablet 100 mg 100 mg at 10/30/17 2218   • alprazolam (XANAX) tablet 0.5 mg 0.5 mg at 10/31/17 0846   • fluticasone (FLONASE) nasal spray 100 mcg 100 mcg at 10/31/17 0846   • sertraline (ZOLOFT) tablet 100 mg 100 mg at 10/30/17 2138   • oxycodone immediate-release (ROXICODONE) tablet 5 mg      Or   • oxycodone immediate release (ROXICODONE) tablet 10 mg 10 mg at 10/30/17 1214   • morphine (pf) 4 mg/ml injection 1-4 mg 4 mg at 10/31/17 1137   • tiotropium (SPIRIVA) 18 MCG inhalation capsule 1 Cap 1 Cap at 10/31/17 0845   • Tadalafil (PAH) TABS 40 mg 40 mg at 10/30/17 2100   • tacrolimus (PROGRAF) capsule 1.5 mg 1.5 mg at 10/31/17 0845   • pravastatin (PRAVACHOL) tablet 20 mg 20 mg at 10/31/17 0846   • omeprazole (PRILOSEC) capsule 40  mg 40 mg at 10/31/17 0846   • mycophenolate (CELLCEPT) capsule 500 mg 500 mg at 10/31/17 0846   • levothyroxine (SYNTHROID) tablet 137 mcg 137 mcg at 10/31/17 0614   • gabapentin (NEURONTIN) capsule 600 mg 600 mg at 10/31/17 0846   • cyclobenzaprine (FLEXERIL) tablet 10 mg     • NS infusion 2,355 mL     • senna-docusate (PERICOLACE or SENOKOT S) 8.6-50 MG per tablet 2 Tab 2 Tab at 10/31/17 0846    And   • polyethylene glycol/lytes (MIRALAX) PACKET 1 Packet 1 Packet at 10/29/17 0802    And   • magnesium hydroxide (MILK OF MAGNESIA) suspension 30 mL Stopped at 10/28/17 0900    And   • bisacodyl (DULCOLAX) suppository 10 mg 10 mg at 10/29/17 0801   • Respiratory Care per Protocol     • insulin lispro (HUMALOG) injection 1-6 Units Stopped at 10/31/17 1100   • glucose 4 g chewable tablet 16 g      And   • dextrose 50% (D50W) injection 25 mL     • ondansetron (ZOFRAN) syringe/vial injection 4 mg 4 mg at 10/30/17 2218   • ondansetron (ZOFRAN ODT) dispertab 4 mg 4 mg at 10/29/17 1509   • promethazine (PHENERGAN) tablet 12.5-25 mg     • promethazine (PHENERGAN) suppository 12.5-25 mg     • prochlorperazine (COMPAZINE) injection 5-10 mg     • piperacillin-tazobactam (ZOSYN) IVPB premix 4.5 g 4.5 g at 10/31/17 0851   • ambrisentan (LETAIRIS) TABS 10 mg 10 mg at 10/31/17 1138     Medical Decision Making, by Problem:  Active Hospital Problems    Diagnosis   • Septic shock (CMS-HCC) [A41.9, R65.21]   • Hypotension [I95.9]   • Immunocompromised state (CMS-HCC) [D84.9]   • Pulmonary hypertension [I27.20]   • Sarcoidosis (CMS-HCC) [D86.9]   • Hypothyroidism, adult [E03.9]   • Sepsis (CMS-HCC) [A41.9]   • Chronic use of opiate drugs therapeutic purposes [Z79.891]   • Thrombocytopenia (CMS-HCC) [D69.6]       Plan:  1.  Shock, most likely secondary to adrenal insufficiency - cannot rule out sepsis from an infectious source.  2.  Status post panniculectomy - postop day #5.  3.  History of liver transplantation, on current Prograf,  CellCept, and prednisone.  4.  Pulmonary hypertension.  5.  Sarcoidosis.  6.  Hypothyroidism.  7.  Chronic kidney disease, stage III.  8.  Monoclonal gammopathy.  9.  Known history of pseudomembranous colitis x3.     PLAN:    Vanco has been stopped for >48 hours and no issues. Zosyn off ~24 hours. No obvious source of infection. Does have a hematoma at the surgical site with drains in place - 4 x 17 x 4.8 cm. Her PCP will follow the drains per patient. Will likely need output monitoring and interval CT scanning to ensure stability. Hematoma does not appear infected. Would not want this fluid collection turning into an abscess. On oral antibiotics at this time.  Okay to dc home.    PCP will follow incision/drains and hematoma as outpatient.  Do not think she needs another CT at this time.    Continue to following her systemic inflammatory response syndrome signs and symptoms     She will follow up with her PCP post discharge.  No need for follow up with ID.  If she develops any problems her PCP can contact us.     Discussed with patient.  35 min >50% of time spent in coordination of care/counseling.

## 2017-10-31 NOTE — PROGRESS NOTES
Bedside report received by day PENNY Bella. Patient sitting up in bed watching TV at this time. POC discussed, verbalized understanding. No immediate concerns for patient at this time. All safety measures in place.

## 2017-10-31 NOTE — PROGRESS NOTES
Patient discharged to home by the Physician.  Discharge instructions reviewed with patient including new medications, follow-up appointments, wound/ANGIE drain care, and signs/symptoms to watch for.  Patient declined follow-up appointment with ID and stated she would make her own PCP appointment.  PIV removed by RN with tip intact.

## 2017-10-31 NOTE — CARE PLAN
Problem: Communication  Goal: The ability to communicate needs accurately and effectively will improve  Outcome: NOT MET  Bedside report completed. Patient educated on plan of care, call light, and communication board. Patient verbalizes understanding.       Problem: Pain Management  Goal: Pain level will decrease to patient's comfort goal  Outcome: PROGRESSING AS EXPECTED  Pain assessed and documented per unit protocol and appropriate pharmacological and non-pharmacological interventions implemented. Reviewed pain goals with patient.

## 2017-10-31 NOTE — CARE PLAN
Problem: Safety  Goal: Will remain free from injury  Outcome: MET Date Met: 10/31/17  Patient independent in room.  Instructed to call if assistance needed.  Verbalized understanding.    Problem: Knowledge Deficit  Goal: Knowledge of disease process/condition, treatment plan, diagnostic tests, and medications will improve  Outcome: PROGRESSING AS EXPECTED  Patient updated on today's plan of care.

## 2017-11-01 ENCOUNTER — PATIENT OUTREACH (OUTPATIENT)
Dept: HEALTH INFORMATION MANAGEMENT | Facility: OTHER | Age: 57
End: 2017-11-01

## 2017-11-03 ENCOUNTER — TELEPHONE (OUTPATIENT)
Dept: MEDICAL GROUP | Facility: PHYSICIAN GROUP | Age: 57
End: 2017-11-03

## 2017-11-03 ENCOUNTER — OFFICE VISIT (OUTPATIENT)
Dept: MEDICAL GROUP | Facility: PHYSICIAN GROUP | Age: 57
End: 2017-11-03
Payer: MEDICARE

## 2017-11-03 VITALS
WEIGHT: 178 LBS | RESPIRATION RATE: 16 BRPM | OXYGEN SATURATION: 94 % | SYSTOLIC BLOOD PRESSURE: 126 MMHG | HEIGHT: 68 IN | HEART RATE: 70 BPM | BODY MASS INDEX: 26.98 KG/M2 | DIASTOLIC BLOOD PRESSURE: 64 MMHG | TEMPERATURE: 98.2 F

## 2017-11-03 DIAGNOSIS — J96.11 CHRONIC RESPIRATORY FAILURE WITH HYPOXIA (HCC): ICD-10-CM

## 2017-11-03 DIAGNOSIS — Z48.03 CHANGE OR REMOVAL OF DRAINS: ICD-10-CM

## 2017-11-03 DIAGNOSIS — Z48.01 CHANGE OR REMOVAL OF SURGICAL WOUND DRESSING: ICD-10-CM

## 2017-11-03 DIAGNOSIS — E27.1 ADDISON'S DISEASE (HCC): ICD-10-CM

## 2017-11-03 DIAGNOSIS — Z98.890 STATUS POST PANNICULECTOMY: ICD-10-CM

## 2017-11-03 DIAGNOSIS — A41.9 SEPSIS, DUE TO UNSPECIFIED ORGANISM: ICD-10-CM

## 2017-11-03 DIAGNOSIS — Z79.891 CHRONIC USE OF OPIATE DRUGS THERAPEUTIC PURPOSES: ICD-10-CM

## 2017-11-03 DIAGNOSIS — K59.03 DRUG-INDUCED CONSTIPATION: ICD-10-CM

## 2017-11-03 PROCEDURE — 99214 OFFICE O/P EST MOD 30 MIN: CPT | Performed by: FAMILY MEDICINE

## 2017-11-03 NOTE — TELEPHONE ENCOUNTER
MEDICATION PRIOR AUTHORIZATION NEEDED:    1. Name of Medication: RELISTOR    2. Requested By (Name of Pharmacy): [facility]     3. Is insurance on file current? YES    4. What is the name & phone number of the 3rd party payor? [employer]

## 2017-11-03 NOTE — PROGRESS NOTES
Subjective:     Chief Complaint   Patient presents with   • Suture / Staple Removal     Drain removal       Roxana Cuba is a 57 y.o. female here today for follow up after surgical panniculectomy.   Pt reports overnight stay at [facility] after surgery on 10/25.  Pt reports the next day she had temp in 104.She has also experiencing generalized malaise.  Pt went to [facility] 10/27-10/31 for sepsis, fever, and hypotension. She was placed initially on zosyn and vancomycin, then transitioned to amoxicillin and flagyl for 7 day course recommended by ID.    CT showed large hyperdense fluid collection consistent with hematoma. Patient had 2 ANGIE drains placed during surgery. These drains were recommended to be removed in 7-10 days after surgery when output was less than 30 cc. Output from drains have been less than 30 cc for 2 days.    Since hospitalization patient reports improvement in generalized malaise. She has had no further fevers or chills. She reports she continues to have severe abdominal and back pain. She is currently taking morphine 15 mg 2-3 times per day with only mild resolution of pain.    Patient continues to have severe opiate-induced constipation. Pt cannot bear down due to surgery.  She has had no improvement with over-the-counter stool softeners and fiber products such as MiraLAX, Colace, Benefiber, and Senokot. Patient has had no improvement with prescription medications Linzess or Movantik. She reports she has only able to have a bowel movement without straining with use of Relistor while in the hospital.     Patient has a complicated past medical history including insulin-dependent diabetes mellitus, sarcoidosis, liver transplant, chronic respiratory failure, NSTEMI,  pancreatitis, CKD, hepatopulmonary syndrome, COPD, MGUS, gastric bypass, and pulmonary hypertension. Patient is on antirejection medication as well as prednisone which results in an immunocompromised state. Patient has had shingles, VRE and  C. difficile.  Allergies   Allergen Reactions   • Rhubarb Anaphylaxis   • Organic Nitrates      Nitroglycerin products should be avoided with the use of PDE-5 inhibitors as the combination can result in severe hypotension.   • Vancomycin Hcl      Causes red man syndrome when infused to fast       Current medicines (including changes today)  Current Outpatient Prescriptions   Medication Sig Dispense Refill   • methylnaltrexone (RELISTOR) 12 MG/0.6ML Solution Inject 0.6 mL as instructed 1 time daily as needed for up to 30 days. 18 mL 2   • morphine (MS IR) 15 MG tablet Take 1 Tab by mouth every four hours as needed for Severe Pain. 30 Tab 0   • alprazolam (XANAX) 0.5 MG Tab Take 1 Tab by mouth 3 times a day. 30 Tab 0   • gabapentin (NEURONTIN) 800 MG tablet Take 1 Tab by mouth 3 times a day. 90 Tab 3   • amoxicillin (AMOXIL) 500 MG Cap Take 1 Cap by mouth 3 times a day for 3 days. 9 Cap 0   • metronidazole (FLAGYL) 500 MG Tab Take 1 Tab by mouth every 8 hours for 3 days. 9 Tab 0   • NON SPECIFIED Take 1 Cap by mouth every evening. Bariatric Dietary Supplment     • tacrolimus (PROGRAF) 0.5 MG Cap Take 1.5 mg by mouth 2 Times a Day. Pt uses a 1 mg and 0.5 mg cap     • Naloxegol Oxalate 25 MG Tab Take 25 mg by mouth every day. 30 Tab 2   • oxycodone immediate release (ROXICODONE) 10 MG immediate release tablet Take 1-3 Tabs by mouth every 6 hours as needed for Moderate Pain. 90 Tab 0   • predniSONE (DELTASONE) 1 MG Tab Take 7 Tabs by mouth every day. 7 Tab 0   • Wheat Dextrin (BENEFIBER) Powder Take 3.8 g by mouth every day.     • polyethylene glycol 3350 (MIRALAX) Powder Take 1 g by mouth every day.     • levothyroxine (SYNTHROID) 137 MCG Tab Take 137 mcg by mouth Every morning on an empty stomach.     • ambrisentan (LETAIRIS) 10 MG tablet Take 1 Tab by mouth every day. 30 Tab 11   • pravastatin (PRAVACHOL) 20 MG Tab TAKE 1 TAB BY MOUTH EVERY DAY. 30 Tab 11   • fluticasone (FLONASE) 50 MCG/ACT nasal spray SPRAY 1 SPRAY  IN EACH NOSTRIL TWICE A DAY 48 g 1   • trazodone (DESYREL) 50 MG Tab TAKE 1-2 TABS BY MOUTH EVERY BEDTIME. 90 Tab 2   • Tadalafil, PAH, (ADCIRCA) 20 MG Tab Take 40 mg by mouth every bedtime. Indications: Pulmonary Arterial Hypertension 180 Tab 3   • cyclobenzaprine (FLEXERIL) 10 MG Tab Take 10 mg by mouth 3 times a day as needed for Muscle Spasms.     • ferrous sulfate (FEOSOL) 220 (44 FE) MG/5ML Elixir Take 5 mL by mouth every day. 1 Bottle 10   • Linaclotide 290 MCG Cap Take 290 mg by mouth every day. 90 Cap 3   • CITRACAL MAXIMUM 315-250 MG-UNIT Tab TAKE 2 TABS BY MOUTH 2X/ DAY WITH FOOD BEFORE AND AFTER DINNER FOR LIFE-CRUSH 1ST 3 MONTH, SPACE  Tab 11   • ascorbic acid (ASCORBIC ACID) 500 MG Tab Take 500 mg by mouth every day.     • sennosides (SENOKOT) 8.6 MG Tab Take 17.2 mg by mouth 2 times a day.     • Probiotic Product (PROBIOTIC DAILY PO) Take 1 Cap by mouth every day.     • omeprazole (PRILOSEC) 40 MG delayed-release capsule Take 1 Cap by mouth every day. 90 Cap 4   • ondansetron (ZOFRAN ODT) 4 MG TABLET DISPERSIBLE Take 1 Tab by mouth every 8 hours as needed for Nausea/Vomiting. Nausea and vomiting 270 Tab 4   • tiotropium (SPIRIVA) 18 MCG Cap Inhale 1 Cap by mouth every day. 90 Cap 4   • sertraline (ZOLOFT) 100 MG Tab Take 100 mg by mouth every bedtime.     • docusate sodium (COLACE) 100 MG Cap Take 100 mg by mouth 2 times a day.     • mycophenolate (CELLCEPT) 250 MG Cap Take 500 mg by mouth 2 times a day.       No current facility-administered medications for this visit.      Social History   Substance Use Topics   • Smoking status: Passive Smoke Exposure - Never Smoker   • Smokeless tobacco: Never Used      Comment: avoid all tobacco products   • Alcohol use No      Comment: 2009 quit drinking     Family Status   Relation Status   • Father    • Mother    • Sister Alive   • Paternal Aunt    • Maternal Grandmother    • Maternal Grandfather      Family History   Problem Relation  "Age of Onset   • Other Father      Unknown (dead 10 years)   • Diabetes Father    • Heart Disease Father    • Hypertension Father    • Hyperlipidemia Father    • Stroke Father    • Non-contributory Mother    • Hyperlipidemia Mother    • Alcohol/Drug Mother    • Cancer Paternal Aunt    • Alcohol/Drug Maternal Grandmother    • Alcohol/Drug Maternal Grandfather      She    has a past medical history of Breath shortness; Bronchitis ( ); Cardiomegaly; Chronic fatigue and malaise; Cirrhosis (CMS-HCC) (December 2011); CKD (chronic kidney disease) stage 3, GFR 30-59 ml/min; Diabetes (CMS-HCC); Fracture of left foot; GERD (gastroesophageal reflux disease); H/O Clostridium difficile infection (02-17-17); Hypothyroid; Low back pain (02-17-17); Mild aortic regurgitation and aortic valve sclerosis ( ); On home oxygen therapy; Platelet disorder (CMS-HCC); Pneumonia; Psychiatric disorder; Pulmonary hypertension; S/P cholecystectomy; Small bowel obstruction; Splenomegaly; and VRE (vancomycin-resistant Enterococci).        ROS   GEN: no fevers, or chills  HEENT: no blurry vision or change in vision  Resp: + chronic  shortness of breath  CV: no racing heart, no irregular beats  Abd:  Abd pain, no vomiting, no diarrhea, + constipation, no blood in stool, no dark stools, no incontinence  : no dysuria, no hematuria, no urinary incontinence, no increased frequency  MSK: LBP, thoracic pain, rib pain  Neuro: no headaches, no dizziness, no LOC       Objective:     Blood pressure 126/64, pulse 70, temperature 36.8 °C (98.2 °F), resp. rate 16, height 1.727 m (5' 8\"), weight 80.7 kg (178 lb), last menstrual period 01/03/2000, SpO2 94 %. Body mass index is 27.06 kg/m².   Physical Exam:  Constitutional: Alert, no distress.  Skin: Warm, dry, good turgor, no rashes in visible areas.  Eye: Equal, round and reactive, conjunctiva clear, lids normal.  Neck: Trachea midline, no masses, no thyromegaly. No cervical or supraclavicular lymphadenopathy. "   Respiratory: Unlabored respiratory effort, good air movement, lungs clear to auscultation, no wheezes, no ronchi, nasal cannula in place.  Cardiovascular:RRR, +S1, S2, no murmur, no peripheral edema, pedal and radial pulses equal and intact bilat  Abdomen: Low transverse surgical incision with Steri-Strips in place and Tegaderm. Tegaderm and Steri-Strips removed. Surrounding erythema less than 1 mm around incision site, no discharge or warmth surrounding wound. New dressing applied with sterile gauze and Tegaderm ecchymosis noted on left lower abdominal quadrant extending into left lateral aspect of thigh, mild distention, tenderness to deep palpation without rebound tenderness or guarding, no fluid shift  MSK:5/5 muscle strength in upper extremities as well as lower extremity bilaterally  Psych: Alert and oriented, appropriate affect and mood.  Neuro: CN2-12 intact, no gross motor or sensory deficits      Procedure: ANGIE drain removal  Patient advised to have drain removed 7-10 days after surgery when less than 30 cc per day. Today's post op day #9. Patient has been less than 30 cc a day for 2 days. Left ANGIE drain with serosanguineous fluid less then 10 cc overnight, right ANGIE drain with serosanguineous fluid less then 5 cc overnight  Right ANGIE drain: one suture removed, gauze applied over the drain site and catheter easily removed in one piece without pain, scant blood loss, 3 Steri-Strips applied  Left ANGIE drain: one suture removed, gauze applied over the drain site and catheter easily  removed without in one piece pain, scant blood loss, 2 Steri-Strips applied        Assessment and Plan:   The following treatment plan was discussed    1. Change or removal of drains  2 ANGIE drains removed as above. Patient tolerated well scant blood loss.    2. Change or removal of surgical wound dressing  Surgical Tegaderm and Steri-Strips removed, new dressing applied with sterile gauze and Tegaderm. Recommend follow-up with home  health for wound care    3. Status post panniculectomy  No complications during surgery. Patient was noted to be septic one day after surgery. Surgery was done in [place] therefore no follow-up.    4. Intra-abdominal hematoma, subsequent encounter  Hematoma noted on recent CT. We will recheck for resolution as ANGIE drains have had less than 30 cc of output each day for 2 days.  - CT-ABDOMEN-PELVIS WITH; Future  - CT-ABDOMEN-PELVIS WITH; Future    5. Drug-induced constipation  Chronic: Worsening. Patient is unable to have any bowel movement due to inability to strain from recent surgery. As noted above patient has felt Benefiber, Senokot, MiraLAX, Colace, Movantik, and Linzess  - methylnaltrexone (RELISTOR) 12 MG/0.6ML Solution; Inject 0.6 mL as instructed 1 time daily as needed for up to 30 days.  Dispense: 18 mL; Refill: 2    6. Chronic use of opiate drugs therapeutic purposes  Increased pain from surgery. Recommend continuing morphine 15 mg. Patient's increased need for pain medication has worsening her drug-induced constipation.  - methylnaltrexone (RELISTOR) 12 MG/0.6ML Solution; Inject 0.6 mL as instructed 1 time daily as needed for up to 30 days.  Dispense: 18 mL; Refill: 2    7. Sepsis, due to unspecified organism (CMS-HCC)  No fever, no chills, heart rate within normal limits. Warning signs of sepsis again discussed.    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 Patient is on chronic steroids. We'll continue to closely monitor blood pressure.    9. Chronic respiratory failure with hypoxia (CMS-HCC)  Chronic: Continue continuous oxygen. Recommend continuing incentive spirometer due to increased risk of pneumonia postoperatively.     Home health referral created to assist patient while she is healing and for wound dressing changes.  Warning signs of abdominal hematoma and sepsis discussed. For any fever, chills, nausea, vomiting, worsening abdominal pain, or generalized  malaise patient is to go to ER immediately.  Followup: Return in about 2 weeks (around 11/17/2017), or post op.    Please note that this dictation was created using voice recognition software. I have made every reasonable attempt to correct obvious errors, but I expect that there are errors of grammar and possibly content that I did not discover before finalizing the note.

## 2017-11-03 NOTE — TELEPHONE ENCOUNTER
Pt states she has discussed a referral to wound care with you previously and would like to know if you will still place a referral? Please advise.

## 2017-11-05 ENCOUNTER — HOSPITAL ENCOUNTER (OUTPATIENT)
Dept: RADIOLOGY | Facility: MEDICAL CENTER | Age: 57
End: 2017-11-05
Attending: FAMILY MEDICINE
Payer: MEDICARE

## 2017-11-05 PROCEDURE — 74177 CT ABD & PELVIS W/CONTRAST: CPT

## 2017-11-05 PROCEDURE — 700117 HCHG RX CONTRAST REV CODE 255: Performed by: FAMILY MEDICINE

## 2017-11-05 RX ADMIN — IOHEXOL 100 ML: 350 INJECTION, SOLUTION INTRAVENOUS at 15:52

## 2017-11-06 ENCOUNTER — HOME HEALTH ADMISSION (OUTPATIENT)
Dept: HOME HEALTH SERVICES | Facility: HOME HEALTHCARE | Age: 57
End: 2017-11-06
Payer: MEDICARE

## 2017-11-07 ENCOUNTER — TELEPHONE (OUTPATIENT)
Dept: MEDICAL GROUP | Facility: PHYSICIAN GROUP | Age: 57
End: 2017-11-07

## 2017-11-07 ENCOUNTER — HOME CARE VISIT (OUTPATIENT)
Dept: HOME HEALTH SERVICES | Facility: HOME HEALTHCARE | Age: 57
End: 2017-11-07
Payer: MEDICARE

## 2017-11-07 VITALS
RESPIRATION RATE: 16 BRPM | TEMPERATURE: 99.3 F | SYSTOLIC BLOOD PRESSURE: 104 MMHG | DIASTOLIC BLOOD PRESSURE: 72 MMHG | HEART RATE: 65 BPM | BODY MASS INDEX: 25.7 KG/M2 | WEIGHT: 169 LBS | OXYGEN SATURATION: 98 %

## 2017-11-07 PROCEDURE — A6212 FOAM DRG <=16 SQ IN W/BORDER: HCPCS

## 2017-11-07 PROCEDURE — G0162 HHC RN E&M PLAN SVS, 15 MIN: HCPCS

## 2017-11-07 PROCEDURE — A6216 NON-STERILE GAUZE<=16 SQ IN: HCPCS

## 2017-11-07 PROCEDURE — A4216 STERILE WATER/SALINE, 10 ML: HCPCS

## 2017-11-07 PROCEDURE — A6207 CONTACT LAYER >16<= 48 SQ IN: HCPCS

## 2017-11-07 PROCEDURE — A6250 SKIN SEAL PROTECT MOISTURIZR: HCPCS

## 2017-11-07 PROCEDURE — 665001 SOC-HOME HEALTH

## 2017-11-07 SDOH — ECONOMIC STABILITY: HOUSING INSECURITY: UNSAFE APPLIANCES: 0

## 2017-11-07 SDOH — ECONOMIC STABILITY: HOUSING INSECURITY: UNSAFE COOKING RANGE AREA: 0

## 2017-11-07 ASSESSMENT — ENCOUNTER SYMPTOMS: VOMITING: PT DENIES

## 2017-11-08 ENCOUNTER — TELEPHONE (OUTPATIENT)
Dept: HEALTH INFORMATION MANAGEMENT | Facility: OTHER | Age: 57
End: 2017-11-08

## 2017-11-08 ENCOUNTER — HOME CARE VISIT (OUTPATIENT)
Dept: HOME HEALTH SERVICES | Facility: HOME HEALTHCARE | Age: 57
End: 2017-11-08
Payer: MEDICARE

## 2017-11-08 ENCOUNTER — PATIENT OUTREACH (OUTPATIENT)
Dept: HEALTH INFORMATION MANAGEMENT | Facility: OTHER | Age: 57
End: 2017-11-08

## 2017-11-08 ENCOUNTER — HOSPITAL ENCOUNTER (OUTPATIENT)
Dept: LAB | Facility: MEDICAL CENTER | Age: 57
End: 2017-11-08
Attending: INTERNAL MEDICINE
Payer: MEDICARE

## 2017-11-08 PROCEDURE — 85025 COMPLETE CBC W/AUTO DIFF WBC: CPT

## 2017-11-08 PROCEDURE — 83735 ASSAY OF MAGNESIUM: CPT

## 2017-11-08 PROCEDURE — 80197 ASSAY OF TACROLIMUS: CPT

## 2017-11-08 PROCEDURE — 36415 COLL VENOUS BLD VENIPUNCTURE: CPT

## 2017-11-08 PROCEDURE — 82977 ASSAY OF GGT: CPT

## 2017-11-08 PROCEDURE — 80053 COMPREHEN METABOLIC PANEL: CPT

## 2017-11-08 ASSESSMENT — PATIENT HEALTH QUESTIONNAIRE - PHQ9
2. FEELING DOWN, DEPRESSED, IRRITABLE, OR HOPELESS: 00
1. LITTLE INTEREST OR PLEASURE IN DOING THINGS: 00

## 2017-11-08 ASSESSMENT — ACTIVITIES OF DAILY LIVING (ADL): HOME_HEALTH_OASIS: 01

## 2017-11-08 NOTE — PROGRESS NOTES
"Received referral from [facility] for med rec. Medications reviewed against discharge summary. Furosemide included on patient medication list that was not continued on medication discharge list. Sent message to PCP to clarify if patient should continue furosemide.     Received following message from PCP \" Patient was on Lasix 40 mg prior to hospitalization. I do not see evidence for medication being discontinued. Therefore I recommend continue with Lasix 40 mg daily.   Dr. Reyes. \"    Interaction identified between mycophenolate and omeprazole. PPI could reduce serum concentration of mycophenolate. Interaction identified between tacrolimus and omeprazole. PPI could increase serum concentration of tacrolimus. Recommended to monitor therapy. Per chart review, patient has been on this combination of medications for years. Interaction identified between levothyroxine and calcium/ ferrous sulfate. Outbound call to Roxana to advise patient to separate levothyroxine from these agents by at least 4 hours. Left voice message requesting patient to call [phone].     Cheli Petit, PharmD             "

## 2017-11-08 NOTE — TELEPHONE ENCOUNTER
Dear Dr. Reyes,     I was asked by [facility] to review your patient's medications. When comparing her medication list against discharge summary from hospital, furosemide 40 mg was not continued upon discharge. Would you like your patient to continue taking furosemide 40 mg? Please advise.     Thank you,   Cheli Petit, PHARMD  [facility] Clinical Pharmacist  [address] [place] [address] JAY Llamas [id number]  [phone]

## 2017-11-08 NOTE — TELEPHONE ENCOUNTER
"\"Patient was on Lasix 40 mg prior to hospitalization. I do not see evidence for medication being discontinued. Therefore I recommend continue with Lasix 40 mg daily. Dr. Reyes. \"    Received above message from Dr. Reyes. Spoke with patient regarding interaction between levothyroxine and iron/ calcium. Advised patient to separate these medications by at least 4 hours. Patient notes she is taking her levothyroxine in the morning, her calcium around noon, and her iron at night. No problems identified.      Cheli Petit, PharmD     "

## 2017-11-08 NOTE — TELEPHONE ENCOUNTER
"Medications reviewed against discharge summary. Furosemide included on patient medication list that was not continued on medication discharge list. Sent message to PCP to clarify if patient should continue furosemide.     Received following message from PCP \" Patient was on Lasix 40 mg prior to hospitalization. I do not see evidence for medication being discontinued. Therefore I recommend continue with Lasix 40 mg daily.   Dr. Reyes. \"    Interaction identified between mycophenolate and omeprazole. PPI could reduce serum concentration of mycophenolate. Interaction identified between tacrolimus and omeprazole. PPI could increase serum concentration of tacrolimus. Recommended to monitor therapy. Per chart review, patient has been on this combination of medications for years. Interaction identified between levothyroxine and calcium/ ferrous sulfate. Outbound call to Roxana to advise patient to separate levothyroxine from these agents by at least 4 hours. Left voice message requesting patient to call [phone].     Cheli Petit, PharmD    "

## 2017-11-09 ENCOUNTER — APPOINTMENT (OUTPATIENT)
Dept: RADIOLOGY | Facility: MEDICAL CENTER | Age: 57
End: 2017-11-09
Attending: EMERGENCY MEDICINE
Payer: MEDICARE

## 2017-11-09 ENCOUNTER — RESOLUTE PROFESSIONAL BILLING HOSPITAL PROF FEE (OUTPATIENT)
Dept: HOSPITALIST | Facility: MEDICAL CENTER | Age: 57
End: 2017-11-09
Payer: MEDICARE

## 2017-11-09 ENCOUNTER — HOSPITAL ENCOUNTER (OUTPATIENT)
Facility: MEDICAL CENTER | Age: 57
End: 2017-11-12
Attending: EMERGENCY MEDICINE | Admitting: FAMILY MEDICINE
Payer: MEDICARE

## 2017-11-09 DIAGNOSIS — M54.6 CHRONIC BILATERAL THORACIC BACK PAIN: ICD-10-CM

## 2017-11-09 DIAGNOSIS — K59.03 DRUG-INDUCED CONSTIPATION: ICD-10-CM

## 2017-11-09 DIAGNOSIS — G89.29 CHRONIC BILATERAL THORACIC BACK PAIN: ICD-10-CM

## 2017-11-09 DIAGNOSIS — Z79.891 CHRONIC USE OF OPIATE DRUGS THERAPEUTIC PURPOSES: ICD-10-CM

## 2017-11-09 PROBLEM — K59.00 CONSTIPATION: Status: ACTIVE | Noted: 2017-11-09

## 2017-11-09 LAB
ALBUMIN SERPL BCP-MCNC: 3.9 G/DL (ref 3.2–4.9)
ALBUMIN SERPL BCP-MCNC: 4.1 G/DL (ref 3.2–4.9)
ALBUMIN/GLOB SERPL: 1.7 G/DL
ALBUMIN/GLOB SERPL: 1.8 G/DL
ALP SERPL-CCNC: 24 U/L (ref 30–99)
ALP SERPL-CCNC: 58 U/L (ref 30–99)
ALT SERPL-CCNC: 13 U/L (ref 2–50)
ALT SERPL-CCNC: 27 U/L (ref 2–50)
ANION GAP SERPL CALC-SCNC: 7 MMOL/L (ref 0–11.9)
ANION GAP SERPL CALC-SCNC: 8 MMOL/L (ref 0–11.9)
AST SERPL-CCNC: 16 U/L (ref 12–45)
AST SERPL-CCNC: 25 U/L (ref 12–45)
BASOPHILS # BLD AUTO: 0.6 % (ref 0–1.8)
BASOPHILS # BLD AUTO: 0.6 % (ref 0–1.8)
BASOPHILS # BLD: 0.02 K/UL (ref 0–0.12)
BASOPHILS # BLD: 0.02 K/UL (ref 0–0.12)
BILIRUB SERPL-MCNC: 0.4 MG/DL (ref 0.1–1.5)
BILIRUB SERPL-MCNC: 0.7 MG/DL (ref 0.1–1.5)
BUN SERPL-MCNC: 12 MG/DL (ref 8–22)
BUN SERPL-MCNC: 9 MG/DL (ref 8–22)
CALCIUM SERPL-MCNC: 9 MG/DL (ref 8.5–10.5)
CALCIUM SERPL-MCNC: 9.2 MG/DL (ref 8.4–10.2)
CHLORIDE SERPL-SCNC: 101 MMOL/L (ref 96–112)
CHLORIDE SERPL-SCNC: 102 MMOL/L (ref 96–112)
CO2 SERPL-SCNC: 27 MMOL/L (ref 20–33)
CO2 SERPL-SCNC: 30 MMOL/L (ref 20–33)
CREAT SERPL-MCNC: 1.01 MG/DL (ref 0.5–1.4)
CREAT SERPL-MCNC: 1.05 MG/DL (ref 0.5–1.4)
EOSINOPHIL # BLD AUTO: 0.09 K/UL (ref 0–0.51)
EOSINOPHIL # BLD AUTO: 0.15 K/UL (ref 0–0.51)
EOSINOPHIL NFR BLD: 2.6 % (ref 0–6.9)
EOSINOPHIL NFR BLD: 4.5 % (ref 0–6.9)
ERYTHROCYTE [DISTWIDTH] IN BLOOD BY AUTOMATED COUNT: 48.3 FL (ref 35.9–50)
ERYTHROCYTE [DISTWIDTH] IN BLOOD BY AUTOMATED COUNT: 49.9 FL (ref 35.9–50)
GFR SERPL CREATININE-BSD FRML MDRD: 54 ML/MIN/1.73 M 2
GFR SERPL CREATININE-BSD FRML MDRD: 56 ML/MIN/1.73 M 2
GGT SERPL-CCNC: 9 U/L (ref 7–34)
GLOBULIN SER CALC-MCNC: 2.2 G/DL (ref 1.9–3.5)
GLOBULIN SER CALC-MCNC: 2.4 G/DL (ref 1.9–3.5)
GLUCOSE SERPL-MCNC: 116 MG/DL (ref 65–99)
GLUCOSE SERPL-MCNC: 91 MG/DL (ref 65–99)
HCT VFR BLD AUTO: 29.7 % (ref 37–47)
HCT VFR BLD AUTO: 32.1 % (ref 37–47)
HGB BLD-MCNC: 10 G/DL (ref 12–16)
HGB BLD-MCNC: 9.5 G/DL (ref 12–16)
IMM GRANULOCYTES # BLD AUTO: 0.02 K/UL (ref 0–0.11)
IMM GRANULOCYTES # BLD AUTO: 0.02 K/UL (ref 0–0.11)
IMM GRANULOCYTES NFR BLD AUTO: 0.6 % (ref 0–0.9)
IMM GRANULOCYTES NFR BLD AUTO: 0.6 % (ref 0–0.9)
LYMPHOCYTES # BLD AUTO: 0.95 K/UL (ref 1–4.8)
LYMPHOCYTES # BLD AUTO: 1.04 K/UL (ref 1–4.8)
LYMPHOCYTES NFR BLD: 28.4 % (ref 22–41)
LYMPHOCYTES NFR BLD: 29.8 % (ref 22–41)
MAGNESIUM SERPL-MCNC: 1.9 MG/DL (ref 1.5–2.5)
MCH RBC QN AUTO: 29 PG (ref 27–33)
MCH RBC QN AUTO: 29.4 PG (ref 27–33)
MCHC RBC AUTO-ENTMCNC: 31.2 G/DL (ref 33.6–35)
MCHC RBC AUTO-ENTMCNC: 32 G/DL (ref 33.6–35)
MCV RBC AUTO: 90.5 FL (ref 81.4–97.8)
MCV RBC AUTO: 94.4 FL (ref 81.4–97.8)
MONOCYTES # BLD AUTO: 0.26 K/UL (ref 0–0.85)
MONOCYTES # BLD AUTO: 0.31 K/UL (ref 0–0.85)
MONOCYTES NFR BLD AUTO: 7.4 % (ref 0–13.4)
MONOCYTES NFR BLD AUTO: 9.3 % (ref 0–13.4)
NEUTROPHILS # BLD AUTO: 1.9 K/UL (ref 2–7.15)
NEUTROPHILS # BLD AUTO: 2.06 K/UL (ref 2–7.15)
NEUTROPHILS NFR BLD: 56.6 % (ref 44–72)
NEUTROPHILS NFR BLD: 59 % (ref 44–72)
NRBC # BLD AUTO: 0 K/UL
NRBC # BLD AUTO: 0 K/UL
NRBC BLD AUTO-RTO: 0 /100 WBC
NRBC BLD AUTO-RTO: 0 /100 WBC
PLATELET # BLD AUTO: 103 K/UL (ref 164–446)
PLATELET # BLD AUTO: 96 K/UL (ref 164–446)
PMV BLD AUTO: 8.6 FL (ref 9–12.9)
PMV BLD AUTO: 9.6 FL (ref 9–12.9)
POTASSIUM SERPL-SCNC: 3.5 MMOL/L (ref 3.6–5.5)
POTASSIUM SERPL-SCNC: 3.7 MMOL/L (ref 3.6–5.5)
PROT SERPL-MCNC: 6.1 G/DL (ref 6–8.2)
PROT SERPL-MCNC: 6.5 G/DL (ref 6–8.2)
RBC # BLD AUTO: 3.28 M/UL (ref 4.2–5.4)
RBC # BLD AUTO: 3.4 M/UL (ref 4.2–5.4)
SODIUM SERPL-SCNC: 135 MMOL/L (ref 135–145)
SODIUM SERPL-SCNC: 140 MMOL/L (ref 135–145)
WBC # BLD AUTO: 3.4 K/UL (ref 4.8–10.8)
WBC # BLD AUTO: 3.5 K/UL (ref 4.8–10.8)

## 2017-11-09 PROCEDURE — 99285 EMERGENCY DEPT VISIT HI MDM: CPT

## 2017-11-09 PROCEDURE — 96374 THER/PROPH/DIAG INJ IV PUSH: CPT

## 2017-11-09 PROCEDURE — 36415 COLL VENOUS BLD VENIPUNCTURE: CPT

## 2017-11-09 PROCEDURE — 96375 TX/PRO/DX INJ NEW DRUG ADDON: CPT

## 2017-11-09 PROCEDURE — 700111 HCHG RX REV CODE 636 W/ 250 OVERRIDE (IP): Performed by: EMERGENCY MEDICINE

## 2017-11-09 PROCEDURE — 80053 COMPREHEN METABOLIC PANEL: CPT

## 2017-11-09 PROCEDURE — 74022 RADEX COMPL AQT ABD SERIES: CPT

## 2017-11-09 PROCEDURE — 85025 COMPLETE CBC W/AUTO DIFF WBC: CPT

## 2017-11-09 PROCEDURE — G0378 HOSPITAL OBSERVATION PER HR: HCPCS

## 2017-11-09 RX ORDER — TADALAFIL 20 MG/1
40 TABLET ORAL
Status: DISCONTINUED | OUTPATIENT
Start: 2017-11-10 | End: 2017-11-10

## 2017-11-09 RX ORDER — ONDANSETRON 4 MG/1
4 TABLET, ORALLY DISINTEGRATING ORAL EVERY 4 HOURS PRN
Status: DISCONTINUED | OUTPATIENT
Start: 2017-11-09 | End: 2017-11-12 | Stop reason: HOSPADM

## 2017-11-09 RX ORDER — TRAZODONE HYDROCHLORIDE 50 MG/1
50 TABLET ORAL
Status: DISCONTINUED | OUTPATIENT
Start: 2017-11-10 | End: 2017-11-12 | Stop reason: HOSPADM

## 2017-11-09 RX ORDER — OXYCODONE HYDROCHLORIDE 5 MG/1
10-30 TABLET ORAL EVERY 6 HOURS PRN
Status: DISCONTINUED | OUTPATIENT
Start: 2017-11-09 | End: 2017-11-10

## 2017-11-09 RX ORDER — PREDNISONE 1 MG/1
7 TABLET ORAL DAILY
Status: DISCONTINUED | OUTPATIENT
Start: 2017-11-10 | End: 2017-11-12 | Stop reason: HOSPADM

## 2017-11-09 RX ORDER — PROMETHAZINE HYDROCHLORIDE 25 MG/1
12.5-25 SUPPOSITORY RECTAL EVERY 4 HOURS PRN
Status: DISCONTINUED | OUTPATIENT
Start: 2017-11-09 | End: 2017-11-12 | Stop reason: HOSPADM

## 2017-11-09 RX ORDER — MORPHINE SULFATE 4 MG/ML
4 INJECTION, SOLUTION INTRAMUSCULAR; INTRAVENOUS ONCE
Status: COMPLETED | OUTPATIENT
Start: 2017-11-09 | End: 2017-11-09

## 2017-11-09 RX ORDER — AMBRISENTAN 10 MG/1
10 TABLET, FILM COATED ORAL DAILY
Status: DISCONTINUED | OUTPATIENT
Start: 2017-11-10 | End: 2017-11-10

## 2017-11-09 RX ORDER — CYCLOBENZAPRINE HCL 10 MG
10 TABLET ORAL 3 TIMES DAILY PRN
Status: DISCONTINUED | OUTPATIENT
Start: 2017-11-09 | End: 2017-11-10

## 2017-11-09 RX ORDER — OMEPRAZOLE 20 MG/1
40 CAPSULE, DELAYED RELEASE ORAL DAILY
Status: DISCONTINUED | OUTPATIENT
Start: 2017-11-10 | End: 2017-11-12 | Stop reason: HOSPADM

## 2017-11-09 RX ORDER — PROMETHAZINE HYDROCHLORIDE 25 MG/1
12.5-25 TABLET ORAL EVERY 4 HOURS PRN
Status: DISCONTINUED | OUTPATIENT
Start: 2017-11-09 | End: 2017-11-12 | Stop reason: HOSPADM

## 2017-11-09 RX ORDER — FUROSEMIDE 40 MG/1
40 TABLET ORAL DAILY
Status: DISCONTINUED | OUTPATIENT
Start: 2017-11-10 | End: 2017-11-11

## 2017-11-09 RX ORDER — GABAPENTIN 400 MG/1
800 CAPSULE ORAL 3 TIMES DAILY
Status: DISCONTINUED | OUTPATIENT
Start: 2017-11-10 | End: 2017-11-12 | Stop reason: HOSPADM

## 2017-11-09 RX ORDER — LEVOTHYROXINE SODIUM 137 UG/1
137 TABLET ORAL
Status: DISCONTINUED | OUTPATIENT
Start: 2017-11-10 | End: 2017-11-12 | Stop reason: HOSPADM

## 2017-11-09 RX ORDER — PRAVASTATIN SODIUM 20 MG
20 TABLET ORAL DAILY
Status: DISCONTINUED | OUTPATIENT
Start: 2017-11-10 | End: 2017-11-12 | Stop reason: HOSPADM

## 2017-11-09 RX ORDER — MORPHINE SULFATE 15 MG/1
15 TABLET ORAL EVERY 4 HOURS PRN
Status: DISCONTINUED | OUTPATIENT
Start: 2017-11-09 | End: 2017-11-11

## 2017-11-09 RX ORDER — TIOTROPIUM BROMIDE 18 UG/1
1 CAPSULE ORAL; RESPIRATORY (INHALATION)
Status: DISCONTINUED | OUTPATIENT
Start: 2017-11-10 | End: 2017-11-12

## 2017-11-09 RX ORDER — ALPRAZOLAM 0.5 MG/1
0.5 TABLET ORAL 3 TIMES DAILY
Status: DISCONTINUED | OUTPATIENT
Start: 2017-11-10 | End: 2017-11-12 | Stop reason: HOSPADM

## 2017-11-09 RX ORDER — ASCORBIC ACID 500 MG
500 TABLET ORAL DAILY
Status: DISCONTINUED | OUTPATIENT
Start: 2017-11-10 | End: 2017-11-12 | Stop reason: HOSPADM

## 2017-11-09 RX ORDER — ONDANSETRON 2 MG/ML
4 INJECTION INTRAMUSCULAR; INTRAVENOUS EVERY 4 HOURS PRN
Status: DISCONTINUED | OUTPATIENT
Start: 2017-11-09 | End: 2017-11-12 | Stop reason: HOSPADM

## 2017-11-09 RX ORDER — MYCOPHENOLATE MOFETIL 250 MG/1
500 CAPSULE ORAL 2 TIMES DAILY
Status: DISCONTINUED | OUTPATIENT
Start: 2017-11-10 | End: 2017-11-12 | Stop reason: HOSPADM

## 2017-11-09 RX ORDER — ONDANSETRON 2 MG/ML
4 INJECTION INTRAMUSCULAR; INTRAVENOUS ONCE
Status: COMPLETED | OUTPATIENT
Start: 2017-11-09 | End: 2017-11-09

## 2017-11-09 RX ADMIN — MORPHINE SULFATE 4 MG: 4 INJECTION INTRAVENOUS at 22:16

## 2017-11-09 RX ADMIN — ONDANSETRON 4 MG: 2 INJECTION, SOLUTION INTRAMUSCULAR; INTRAVENOUS at 22:16

## 2017-11-09 SDOH — ECONOMIC STABILITY: HOUSING INSECURITY: HOME SAFETY: IALS PRESENT IN THE HOME.  PT'S HOA DOES NOT ALLOW OXYGEN SIGNS POSTED TO SHOW TO OUTSIDE HOME.

## 2017-11-09 SDOH — ECONOMIC STABILITY: HOUSING INSECURITY
HOME SAFETY: IN THE HOME. SMOKE ALARMS ARE PRESENT AND FUNCTIONAL ON EACH LEVEL OF THE HOME. PATIENT DOES HAVE A FIRE ESCAPE PLAN DEVELOPED. PATIENT DOES NOT HAVE FLAMMABLE MATERIALS PRESENT IN THE HOME PRESENTING A FIRE HAZARD. NO EVIDENCE FOUND OF SMOKING MATER

## 2017-11-09 SDOH — ECONOMIC STABILITY: HOUSING INSECURITY
HOME SAFETY: OXYGEN SAFETY RISK ASSESSMENT PERFORMED. PATIENT DOES NOT HAVE A NO SMOKING SIGN POSTED IN THE HOME., PT WAS GIVEN A NO SMOKING SIGN AND PROVIDED EDUCATION ABOUT WHY IT IS IMPORTANT TO PLACE ONE. PATIENT DOES HAVE A WORKING FIRE EXTINGUISHER PRESENT

## 2017-11-09 ASSESSMENT — PAIN SCALES - GENERAL
PAINLEVEL_OUTOF10: 8
PAINLEVEL_OUTOF10: 10

## 2017-11-10 ENCOUNTER — HOME CARE VISIT (OUTPATIENT)
Dept: HOME HEALTH SERVICES | Facility: HOME HEALTHCARE | Age: 57
End: 2017-11-10
Payer: MEDICARE

## 2017-11-10 LAB
ANION GAP SERPL CALC-SCNC: 7 MMOL/L (ref 0–11.9)
BASOPHILS # BLD AUTO: 0.7 % (ref 0–1.8)
BASOPHILS # BLD: 0.02 K/UL (ref 0–0.12)
BUN SERPL-MCNC: 10 MG/DL (ref 8–22)
CALCIUM SERPL-MCNC: 8.9 MG/DL (ref 8.4–10.2)
CHLORIDE SERPL-SCNC: 105 MMOL/L (ref 96–112)
CO2 SERPL-SCNC: 31 MMOL/L (ref 20–33)
CREAT SERPL-MCNC: 1.08 MG/DL (ref 0.5–1.4)
EOSINOPHIL # BLD AUTO: 0.12 K/UL (ref 0–0.51)
EOSINOPHIL NFR BLD: 4.4 % (ref 0–6.9)
ERYTHROCYTE [DISTWIDTH] IN BLOOD BY AUTOMATED COUNT: 50.5 FL (ref 35.9–50)
GFR SERPL CREATININE-BSD FRML MDRD: 52 ML/MIN/1.73 M 2
GLUCOSE BLD-MCNC: 127 MG/DL (ref 65–99)
GLUCOSE SERPL-MCNC: 91 MG/DL (ref 65–99)
HCT VFR BLD AUTO: 31.2 % (ref 37–47)
HGB BLD-MCNC: 9.9 G/DL (ref 12–16)
IMM GRANULOCYTES # BLD AUTO: 0.02 K/UL (ref 0–0.11)
IMM GRANULOCYTES NFR BLD AUTO: 0.7 % (ref 0–0.9)
LYMPHOCYTES # BLD AUTO: 0.88 K/UL (ref 1–4.8)
LYMPHOCYTES NFR BLD: 32.4 % (ref 22–41)
MCH RBC QN AUTO: 29.5 PG (ref 27–33)
MCHC RBC AUTO-ENTMCNC: 31.7 G/DL (ref 33.6–35)
MCV RBC AUTO: 92.9 FL (ref 81.4–97.8)
MONOCYTES # BLD AUTO: 0.24 K/UL (ref 0–0.85)
MONOCYTES NFR BLD AUTO: 8.8 % (ref 0–13.4)
NEUTROPHILS # BLD AUTO: 1.44 K/UL (ref 2–7.15)
NEUTROPHILS NFR BLD: 53 % (ref 44–72)
NRBC # BLD AUTO: 0 K/UL
NRBC BLD AUTO-RTO: 0 /100 WBC
PLATELET # BLD AUTO: 93 K/UL (ref 164–446)
PMV BLD AUTO: 9.4 FL (ref 9–12.9)
POTASSIUM SERPL-SCNC: 3.5 MMOL/L (ref 3.6–5.5)
RBC # BLD AUTO: 3.36 M/UL (ref 4.2–5.4)
SODIUM SERPL-SCNC: 143 MMOL/L (ref 135–145)
TACROLIMUS BLD-MCNC: 2.1 NG/ML
WBC # BLD AUTO: 2.7 K/UL (ref 4.8–10.8)

## 2017-11-10 PROCEDURE — 700102 HCHG RX REV CODE 250 W/ 637 OVERRIDE(OP): Performed by: HOSPITALIST

## 2017-11-10 PROCEDURE — 700102 HCHG RX REV CODE 250 W/ 637 OVERRIDE(OP): Performed by: FAMILY MEDICINE

## 2017-11-10 PROCEDURE — A9270 NON-COVERED ITEM OR SERVICE: HCPCS | Performed by: HOSPITALIST

## 2017-11-10 PROCEDURE — 36415 COLL VENOUS BLD VENIPUNCTURE: CPT

## 2017-11-10 PROCEDURE — 99220 PR INITIAL OBSERVATION CARE,LEVL III: CPT | Performed by: FAMILY MEDICINE

## 2017-11-10 PROCEDURE — 85025 COMPLETE CBC W/AUTO DIFF WBC: CPT

## 2017-11-10 PROCEDURE — 94760 N-INVAS EAR/PLS OXIMETRY 1: CPT

## 2017-11-10 PROCEDURE — 80048 BASIC METABOLIC PNL TOTAL CA: CPT

## 2017-11-10 PROCEDURE — 82962 GLUCOSE BLOOD TEST: CPT

## 2017-11-10 PROCEDURE — 700111 HCHG RX REV CODE 636 W/ 250 OVERRIDE (IP): Performed by: FAMILY MEDICINE

## 2017-11-10 PROCEDURE — 700111 HCHG RX REV CODE 636 W/ 250 OVERRIDE (IP): Performed by: HOSPITALIST

## 2017-11-10 PROCEDURE — A9270 NON-COVERED ITEM OR SERVICE: HCPCS | Performed by: FAMILY MEDICINE

## 2017-11-10 PROCEDURE — 96376 TX/PRO/DX INJ SAME DRUG ADON: CPT

## 2017-11-10 PROCEDURE — G0378 HOSPITAL OBSERVATION PER HR: HCPCS

## 2017-11-10 PROCEDURE — 700105 HCHG RX REV CODE 258: Performed by: HOSPITALIST

## 2017-11-10 RX ORDER — AMBRISENTAN 10 MG/1
10 TABLET, FILM COATED ORAL DAILY
Status: DISCONTINUED | OUTPATIENT
Start: 2017-11-10 | End: 2017-11-12 | Stop reason: HOSPADM

## 2017-11-10 RX ORDER — OXYCODONE HYDROCHLORIDE 10 MG/1
30 TABLET ORAL EVERY 6 HOURS PRN
Status: DISCONTINUED | OUTPATIENT
Start: 2017-11-10 | End: 2017-11-10

## 2017-11-10 RX ORDER — CYCLOBENZAPRINE HCL 10 MG
5-10 TABLET ORAL 3 TIMES DAILY PRN
Status: DISCONTINUED | OUTPATIENT
Start: 2017-11-10 | End: 2017-11-12 | Stop reason: HOSPADM

## 2017-11-10 RX ORDER — ACETAMINOPHEN 325 MG/1
650 TABLET ORAL EVERY 6 HOURS
Status: DISCONTINUED | OUTPATIENT
Start: 2017-11-10 | End: 2017-11-12 | Stop reason: HOSPADM

## 2017-11-10 RX ORDER — OXYCODONE HYDROCHLORIDE 10 MG/1
20 TABLET ORAL EVERY 6 HOURS PRN
Status: DISCONTINUED | OUTPATIENT
Start: 2017-11-10 | End: 2017-11-10

## 2017-11-10 RX ORDER — OXYCODONE HYDROCHLORIDE 10 MG/1
20 TABLET ORAL EVERY 6 HOURS PRN
Status: DISCONTINUED | OUTPATIENT
Start: 2017-11-10 | End: 2017-11-11

## 2017-11-10 RX ORDER — OXYCODONE HYDROCHLORIDE 10 MG/1
30 TABLET ORAL EVERY 6 HOURS PRN
Status: DISCONTINUED | OUTPATIENT
Start: 2017-11-10 | End: 2017-11-11

## 2017-11-10 RX ORDER — OXYCODONE HYDROCHLORIDE 10 MG/1
10 TABLET ORAL EVERY 6 HOURS PRN
Status: DISCONTINUED | OUTPATIENT
Start: 2017-11-10 | End: 2017-11-11

## 2017-11-10 RX ORDER — BISACODYL 10 MG
10 SUPPOSITORY, RECTAL RECTAL
Qty: 30 SUPPOSITORY | Refills: 1 | Status: SHIPPED | OUTPATIENT
Start: 2017-11-10 | End: 2017-12-29 | Stop reason: SDUPTHER

## 2017-11-10 RX ORDER — SODIUM CHLORIDE 9 MG/ML
INJECTION, SOLUTION INTRAVENOUS CONTINUOUS
Status: DISCONTINUED | OUTPATIENT
Start: 2017-11-10 | End: 2017-11-12 | Stop reason: HOSPADM

## 2017-11-10 RX ORDER — BISACODYL 10 MG
10 SUPPOSITORY, RECTAL RECTAL DAILY
Status: DISCONTINUED | OUTPATIENT
Start: 2017-11-10 | End: 2017-11-12 | Stop reason: HOSPADM

## 2017-11-10 RX ORDER — TACROLIMUS 0.5 MG/1
0.5 CAPSULE ORAL 2 TIMES DAILY
Status: DISCONTINUED | OUTPATIENT
Start: 2017-11-10 | End: 2017-11-10

## 2017-11-10 RX ORDER — ACETAMINOPHEN 325 MG/1
2 TABLET ORAL EVERY 6 HOURS
Qty: 30 TAB | Refills: 0 | COMMUNITY
Start: 2017-11-10 | End: 2017-11-30

## 2017-11-10 RX ORDER — TADALAFIL 20 MG/1
40 TABLET ORAL
Status: DISCONTINUED | OUTPATIENT
Start: 2017-11-10 | End: 2017-11-12 | Stop reason: HOSPADM

## 2017-11-10 RX ORDER — OXYCODONE HYDROCHLORIDE 10 MG/1
10 TABLET ORAL EVERY 6 HOURS PRN
Status: DISCONTINUED | OUTPATIENT
Start: 2017-11-10 | End: 2017-11-10

## 2017-11-10 RX ORDER — WITCH HAZEL 50 %
2 PADS, MEDICATED (EA) TOPICAL
Status: DISCONTINUED | OUTPATIENT
Start: 2017-11-10 | End: 2017-11-12 | Stop reason: HOSPADM

## 2017-11-10 RX ADMIN — ENOXAPARIN SODIUM 40 MG: 100 INJECTION SUBCUTANEOUS at 09:51

## 2017-11-10 RX ADMIN — MORPHINE SULFATE 15 MG: 15 TABLET ORAL at 21:22

## 2017-11-10 RX ADMIN — LACTOBACILLUS TAB 2 TABLET: TAB at 18:11

## 2017-11-10 RX ADMIN — ALPRAZOLAM 0.5 MG: 0.5 TABLET ORAL at 21:13

## 2017-11-10 RX ADMIN — METHYLNALTREXONE BROMIDE 12 MG: 12 INJECTION, SOLUTION SUBCUTANEOUS at 06:24

## 2017-11-10 RX ADMIN — PRAVASTATIN SODIUM 20 MG: 20 TABLET ORAL at 21:14

## 2017-11-10 RX ADMIN — FUROSEMIDE 40 MG: 40 TABLET ORAL at 09:49

## 2017-11-10 RX ADMIN — TADALAFIL 40 MG: 20 TABLET ORAL at 21:00

## 2017-11-10 RX ADMIN — ASPIRIN 81 MG: 81 TABLET, COATED ORAL at 09:49

## 2017-11-10 RX ADMIN — TRAZODONE HYDROCHLORIDE 50 MG: 50 TABLET ORAL at 00:53

## 2017-11-10 RX ADMIN — OXYCODONE HYDROCHLORIDE AND ACETAMINOPHEN 500 MG: 500 TABLET ORAL at 09:49

## 2017-11-10 RX ADMIN — LEVOTHYROXINE SODIUM 137 MCG: 137 TABLET ORAL at 06:24

## 2017-11-10 RX ADMIN — ALPRAZOLAM 0.5 MG: 0.5 TABLET ORAL at 15:09

## 2017-11-10 RX ADMIN — MINERAL SUPPLEMENT IRON 300 MG / 5 ML STRENGTH LIQUID 100 PER BOX UNFLAVORED 300 MG: at 09:51

## 2017-11-10 RX ADMIN — ONDANSETRON 4 MG: 2 INJECTION, SOLUTION INTRAMUSCULAR; INTRAVENOUS at 00:54

## 2017-11-10 RX ADMIN — SODIUM CHLORIDE: 9 INJECTION, SOLUTION INTRAVENOUS at 09:43

## 2017-11-10 RX ADMIN — ALPRAZOLAM 0.5 MG: 0.5 TABLET ORAL at 09:44

## 2017-11-10 RX ADMIN — MYCOPHENOLATE MOFETIL 500 MG: 250 CAPSULE ORAL at 09:50

## 2017-11-10 RX ADMIN — SERTRALINE HYDROCHLORIDE 100 MG: 50 TABLET ORAL at 21:15

## 2017-11-10 RX ADMIN — OMEPRAZOLE 40 MG: 20 CAPSULE, DELAYED RELEASE ORAL at 09:50

## 2017-11-10 RX ADMIN — SERTRALINE HYDROCHLORIDE 100 MG: 50 TABLET ORAL at 00:53

## 2017-11-10 RX ADMIN — TACROLIMUS 1.5 MG: 0.5 CAPSULE ORAL at 09:48

## 2017-11-10 RX ADMIN — MORPHINE SULFATE 15 MG: 15 TABLET ORAL at 15:09

## 2017-11-10 RX ADMIN — GABAPENTIN 800 MG: 400 CAPSULE ORAL at 21:14

## 2017-11-10 RX ADMIN — MORPHINE SULFATE 15 MG: 15 TABLET ORAL at 00:53

## 2017-11-10 RX ADMIN — PREDNISONE 7 MG: 1 TABLET ORAL at 09:51

## 2017-11-10 RX ADMIN — AMBRISENTAN 10 MG: 10 TABLET, FILM COATED ORAL at 12:15

## 2017-11-10 RX ADMIN — TRAZODONE HYDROCHLORIDE 50 MG: 50 TABLET ORAL at 21:14

## 2017-11-10 RX ADMIN — MORPHINE SULFATE 15 MG: 15 TABLET ORAL at 11:01

## 2017-11-10 RX ADMIN — GABAPENTIN 800 MG: 400 CAPSULE ORAL at 15:09

## 2017-11-10 RX ADMIN — GABAPENTIN 800 MG: 400 CAPSULE ORAL at 09:49

## 2017-11-10 RX ADMIN — ONDANSETRON 4 MG: 2 INJECTION, SOLUTION INTRAMUSCULAR; INTRAVENOUS at 15:08

## 2017-11-10 RX ADMIN — BISACODYL 10 MG: 10 SUPPOSITORY RECTAL at 09:52

## 2017-11-10 RX ADMIN — MORPHINE SULFATE 15 MG: 15 TABLET ORAL at 06:29

## 2017-11-10 RX ADMIN — ONDANSETRON 4 MG: 2 INJECTION, SOLUTION INTRAMUSCULAR; INTRAVENOUS at 10:16

## 2017-11-10 RX ADMIN — MYCOPHENOLATE MOFETIL 500 MG: 250 CAPSULE ORAL at 21:15

## 2017-11-10 RX ADMIN — MAGNESIUM CITRATE 296 ML: 1.75 LIQUID ORAL at 09:44

## 2017-11-10 RX ADMIN — TIOTROPIUM BROMIDE 1 CAPSULE: 18 CAPSULE ORAL; RESPIRATORY (INHALATION) at 07:21

## 2017-11-10 RX ADMIN — TACROLIMUS 1.5 MG: 0.5 CAPSULE ORAL at 21:14

## 2017-11-10 RX ADMIN — ALPRAZOLAM 0.5 MG: 0.5 TABLET ORAL at 00:53

## 2017-11-10 ASSESSMENT — PATIENT HEALTH QUESTIONNAIRE - PHQ9
SUM OF ALL RESPONSES TO PHQ9 QUESTIONS 1 AND 2: 0
2. FEELING DOWN, DEPRESSED, IRRITABLE, OR HOPELESS: NOT AT ALL
1. LITTLE INTEREST OR PLEASURE IN DOING THINGS: NOT AT ALL
SUM OF ALL RESPONSES TO PHQ QUESTIONS 1-9: 0

## 2017-11-10 ASSESSMENT — PAIN SCALES - GENERAL
PAINLEVEL_OUTOF10: 8
PAINLEVEL_OUTOF10: 7
PAINLEVEL_OUTOF10: 7

## 2017-11-10 ASSESSMENT — LIFESTYLE VARIABLES
DO YOU DRINK ALCOHOL: NO
EVER_SMOKED: NEVER

## 2017-11-10 NOTE — DISCHARGE PLANNING
Medical Social Work    Referral: PASTOR reviewed the chart this AM.      Intervention: Per flowsheet, pt lives with spouse.  Pt is currently on 4L O2 but no home O2 noted.  No SS or DC needs at this time.      Plan: PASTOR Terrazas available to assist with any d.c planning.      Care Transition Team Assessment    Information Source  Information Given By: Patient  Informant's Name: usama    Readmission Evaluation  Is this a readmission?: Yes - unplanned readmission    Elopement Risk  Legal Hold: No  Ambulatory or Self Mobile in Wheelchair: No-Not an Elopement Risk    Interdisciplinary Discharge Planning  Does Admitting Nurse Feel This Could be a Complex Discharge?: No  Lives with - Patient's Self Care Capacity: Spouse  Patient or legal guardian wants to designate a caregiver (see row info): No  Support Systems: Friends / Neighbors, Family Member(s)  Housing / Facility: [address]  Name of Care Facility: home  Do You Take your Prescribed Medications Regularly: No  Able to Return to Previous ADL's: Yes  Mobility Issues: No  Prior Services: None    Discharge Preparedness  What is your plan after discharge?: Home with help         Finances  Prescription Coverage: Yes    Vision / Hearing Impairment  Vision Impairment : No  Hearing Impairment : No    Values / Beliefs / Concerns  Values / Beliefs Concerns : No    Advance Directive  Advance Directive?: DPOA for Health Care  Durable Power of  Name and Contact : Otis Valdez [phone] or [phone]    Domestic Abuse  Have you ever been the victim of abuse or violence?: No  Physical Abuse or Sexual Abuse: No  Verbal Abuse or Emotional Abuse: No  Possible Abuse Reported to:: Not Applicable    Psychological Assessment  History of Substance Abuse: None    Discharge Risks or Barriers  Patient risk factors: Readmission

## 2017-11-10 NOTE — PROGRESS NOTES
Patient feels that BM is inadequate.  Requesting enema.  Spoke with Dr. Marley, patient to receive suppository tomorrow morning, no order for enema, patient to stay overnight.

## 2017-11-10 NOTE — PROGRESS NOTES
Pt arrived from ED via gurney, able to ambulate with minimal assistance, VSS, Discussed POC, complains of abd discomfort d/t constipation. Medicated patient per MAR, call light within reach, bed locked in lowest position, will continue to monitor.

## 2017-11-10 NOTE — ED NOTES
Pt back in bed, no more needs stated at this time, educated on admission process, will cont. Monitoring.

## 2017-11-10 NOTE — ASSESSMENT & PLAN NOTE
2ND TO OPIOID  WILL GIVE MAG CITRATE PO NOW  RECEIEVED ENEMA IN THE ED  KUB IS NOTED  MIGHT NEED GOLYTLI IF MAG CITRATE DOES NOT WORK

## 2017-11-10 NOTE — ED NOTES
"Chief Complaint   Patient presents with   • Abdominal Pain     Pt c/o constipation since having a surgery 10/25. Last BM over a week. Tried 2 enemas tonight with no relief in symptoms, OTC medications not helping.    • Nausea     Denies emesis      /67   Pulse 70   Temp 36.8 °C (98.2 °F)   Resp 18   Ht 1.727 m (5' 8\")   Wt 79.1 kg (174 lb 6.1 oz)   LMP 01/03/2000   SpO2 100%   BMI 26.51 kg/m²     "

## 2017-11-10 NOTE — H&P
HISTORY OF PRESENT ILLNESS:  This is a 57-year-old female who had come to the   emergency room with a complaint of constipation and also abdominal pain, and   some abdominal distention.  The patient states it has been going on for the   past 7 days to 8 days.  Patient states that she has not had any bowel   movements.  The patient also states that she has had a recent pannus removal   and had an infection with sepsis and she was admitted recently in the hospital   as well.  Patient states that there has been some nausea, no vomiting.    Denies any fever, denies any chills.  Patient states that her symptoms were   getting worse, she decided to come to the emergency room.  In the emergency   room, patient had a KUB done that showed there was constipation.    PAST MEDICAL HISTORY:  Sarcoidosis, hypothyroidism, adrenal insufficiency,   chronic kidney disease, chronic fatigue, chronic pain, splenomegaly, and   pulmonary hypertension.    SOCIAL HISTORY:  Denies any tobacco or alcohol use.  Denies any drug use.    FAMILY HISTORY:  Reviewed, not significant.    PAST SURGICAL HISTORY:  Liver transplant at [facility] and also pannus removal.    MEDICATIONS:  On the day of admission, medications are aspirin 81 mg p.o.   daily, Lasix 40 mg p.o. daily, methylnaltrexone 1 injection daily, MSIR 15 mg   p.o. every 4 hours p.r.n., Xanax 0.5 mg p.o. 3 times a day, gabapentin 800 mg   p.o. 3 times a day, Prograf is 1.5 mg p.o. 2 times a day, naloxegol oxalate 25   mg p.o. daily, Roxicodone is _____ tabs p.o. every 6 hours p.r.n., prednisone   7 mg p.o. daily, levothyroxine 137 mcg p.o. daily, Letairis 10 mg p.o. daily,   pravastatin 20 mg p.o. daily, trazodone is 50 mg p.o. at bedtime p.r.n., also   Adcirca 40 mg p.o. at bedtime, Flexeril 10 mg p.o. 3 times a day p.r.n.,   ferrous sulfate is _____ p.o. daily, Citracal 2 tablets 2 times a day, vitamin   C daily, omeprazole 40 mg p.o. daily, Spiriva one inhalation daily, Zoloft   100 mg  p.o. at bedtime, and CellCept 250 mg 2 tablets 2 times a day.    REVIEW OF SYSTEMS:  All 14 systems reviewed, all were negative except what I   mentioned in the history of present illness.    PHYSICAL EXAMINATION:  VITAL SIGNS:  Temperature of 36.8, pulse of 57, respiration rate of 17, blood   pressure 97/56, O2 saturation 92%.  GENERAL APPEARANCE:  Patient is awake, alert, oriented x3, obese.  HEAD AND NECK:  Supple.  Lips are dry.  CARDIOVASCULAR:  S1, S2, regular.  LUNGS:  Decreased breath sounds.  ABDOMEN:  Obese, soft.  The incision site looks clean.  Bowel sounds are   present, but diminished.  Mild tenderness on deep palpation.  LOWER EXTREMITIES:  No edema noted.    LABORATORY DATA:  WBC of 3.5, H and H 9.5 and 29.7, and platelets of 96,000.    Sodium is 135, potassium 3.5, chloride 101, bicarbonate 27, BUN of 9,   creatinine of 1.05, AST of 25, and ALT of 27.  INR 1.    IMAGING:  A KUB was done and impression is no radiographic evidence of acute   abdominal pelvic or thoracic process, stable hyperinflation, but did not   resolve, retrocardiac opacity, similar basilar scarring on right,   dextroscoliosis of the thoracic spine, status post bariatric procedure.    ASSESSMENT AND PLAN:  This is a 57-year-old female with constipation.  1.  Secondary to narcotics.  2.  We will give the patient's magnesium citrate p.o. x1 now.  3.  If this does not work, then the patient needs most likely GoLYTELY.  4.  Also, patient was given enema today in the emergency room.    For liver transplant:  1.  Stable.  2.  Continue with the home medications.    For hypothyroidism:  Continue with the levothyroxine.    Deep venous thrombosis prophylaxis with compression boots.       ____________________________________     Anthony Casey MD    [facility]    DD:  11/10/2017 00:04:58  DT:  11/10/2017 02:14:46    D#:  [id number]  Job#:  [id number]

## 2017-11-10 NOTE — ED PROVIDER NOTES
ED Provider Note    HPI: Patient is a 57-year-old female who presented to the emergency department November 9, 2017 at 7:58 PM with a chief complaint of constipation and abdominal distention.    Patient had surgery on a pain on last month and subsequently became infected she was admitted for this and was recently discharged. She has been constipated since the surgery. She's taken multiple prescription and over-the-counter medications as well as 2 enemas with no effect. She's had some nausea but no vomiting. She has not had a fever or cough. No change in bladder habits. No other somatic complaints    Review of Systems: As per abdominal pain/distention constipation negative for fever cough change in bladder habits. Review of systems reviewed with patient, all other systems negative    Past medical/surgical history: Mood disorder hypothyroid adrenal insufficiency chronic kidney disease chronic fatigue low back pain pneumonia splenomegaly small bowel obstruction pulmonary hypertension diabetes chronic O2 treatment hypothyroid cardiomegaly hysterectomy      Medications: Oxygen aspirin Lasix relistor morphine Xanax Neurontin Prograf naloxegol Roxicodone MiraLAX Synthroid Pravachol lentairis Flonase trazodone adcirca    Allergies:  Nitroglycerin, patient cannot have nitroglycerin due to a medication she is taking vancomycin    Social History: Patient does not smoke no alcohol use      Physical exam: Constitutional: Chronically ill-appearing female appeared tired  Vital signs:  Temperature 98.2 pulse 70 respirations 18 blood pressure 133/67 pulse oximetry 100%  EYES: PERRL, EOMI, Conjunctivae and sclera normal, eyelids normal bilaterally.  Neck: Trachea midline. No cervical masses seen or palpated. Normal range of motion, supple. No meningeal signs elicited.  Cardiac: Regular rate and rhythm. S1-S2 present. No S3 or S4 present. No murmurs, rubs, or gallops heard. No edema or varicosities were seen.   Lungs: Clear to  auscultation with good aeration throughout. No wheezes, rales, or rhonchi heard. Patient's chest wall moved symmetrically with each respiratory effort. Patient was not making use of accessory muscles of respiration in breathing.  Abdomen: Obese ecchymotic Steri-Strips in place no obvious drainage. She is diffusely tender and somewhat distended. No rebound or guarding elicited. No organomegaly or pulsatile abdominal masses identified although the exam is problematic due to the patient's body habitus.  Musculoskeletal:  no  pain with palpitation or movement of muscle, bone or joint , no obvious musculoskeletal deformities identified.  Neurologic: alert and awake answers questions appropriately. Moves all four extremities independently, no gross focal abnormalities identified. Normal strength and motor.  Skin: no rash or lesion seen, no palpable dermatologic lesions identified.  Psychiatric: not anxious, delusional, or hallucinating.    Medical decision making:  IV started laboratory studies obtained significant findings included promise cytopenia of 96 which appears to be more or less chronic last several platelet counts of been mostly lower than that. Chemistry panel is essentially unremarkable    Abdominal x-ray obtained; constipation appears to be present.    His patient has tried essentially all the over-the-counter and outpatient medicines and treatment she can she'll be admitted for serial enemas.    Further care and hospital course per attending physician summary    Impression constipation

## 2017-11-10 NOTE — FLOWSHEET NOTE
11/10/17 0722   Events/Summary/Plan   Events/Summary/Plan DPI with [facility]   Interdisciplinary Plan of Care-Goals (Indications)   Obstructive Ventilatory Defect or Pulmonary Disease without Obvious Obstruction History / Diagnosis   Interdisciplinary Plan of Care-Outcomes    Bronchodilator Outcome Patient at Stable Baseline   Education   Education Yes - Pt. / Family has been Instructed in use of Respiratory Medications and Adverse Reactions   RT Assessment of Delivered Medications   Evaluation of Medication Delivery Daily Yes-- Pt /Family has been Instructed in use of Respiratory Medications and Adverse Reactions   MDI/DPI Group   #MDI/DPI Given MDI/DPI x 1   Respiratory WDL   Respiratory (WDL) WDL   Chest Exam   Respiration 16   Pulse 64   Breath Sounds   RUL Breath Sounds Clear   RML Breath Sounds Clear   RLL Breath Sounds Diminished   MANJINDER Breath Sounds Clear   LLL Breath Sounds Diminished   Oximetry   #Pulse Oximetry (Single Determination) Yes   Oxygen   Pulse Oximetry 95 %   O2 (LPM) 4   O2 Daily Delivery Respiratory  Nasal Cannula

## 2017-11-11 ENCOUNTER — APPOINTMENT (OUTPATIENT)
Dept: RADIOLOGY | Facility: MEDICAL CENTER | Age: 57
End: 2017-11-11
Attending: HOSPITALIST
Payer: MEDICARE

## 2017-11-11 PROCEDURE — 700111 HCHG RX REV CODE 636 W/ 250 OVERRIDE (IP): Performed by: FAMILY MEDICINE

## 2017-11-11 PROCEDURE — 700111 HCHG RX REV CODE 636 W/ 250 OVERRIDE (IP): Performed by: HOSPITALIST

## 2017-11-11 PROCEDURE — 94760 N-INVAS EAR/PLS OXIMETRY 1: CPT

## 2017-11-11 PROCEDURE — 700102 HCHG RX REV CODE 250 W/ 637 OVERRIDE(OP): Performed by: FAMILY MEDICINE

## 2017-11-11 PROCEDURE — 74000 DX-ABDOMEN-1 VIEW: CPT

## 2017-11-11 PROCEDURE — 700105 HCHG RX REV CODE 258: Performed by: HOSPITALIST

## 2017-11-11 PROCEDURE — G0378 HOSPITAL OBSERVATION PER HR: HCPCS

## 2017-11-11 PROCEDURE — A9270 NON-COVERED ITEM OR SERVICE: HCPCS | Performed by: FAMILY MEDICINE

## 2017-11-11 PROCEDURE — 99226 PR SUBSEQUENT OBSERVATION CARE,LEVEL III: CPT | Performed by: HOSPITALIST

## 2017-11-11 PROCEDURE — 700102 HCHG RX REV CODE 250 W/ 637 OVERRIDE(OP): Performed by: HOSPITALIST

## 2017-11-11 PROCEDURE — A9270 NON-COVERED ITEM OR SERVICE: HCPCS | Performed by: HOSPITALIST

## 2017-11-11 RX ORDER — POTASSIUM CHLORIDE 20 MEQ/1
60 TABLET, EXTENDED RELEASE ORAL ONCE
Status: COMPLETED | OUTPATIENT
Start: 2017-11-11 | End: 2017-11-11

## 2017-11-11 RX ORDER — LACTULOSE 20 G/30ML
30 SOLUTION ORAL
Status: DISCONTINUED | OUTPATIENT
Start: 2017-11-11 | End: 2017-11-12 | Stop reason: HOSPADM

## 2017-11-11 RX ORDER — OXYCODONE HYDROCHLORIDE 10 MG/1
10 TABLET ORAL EVERY 6 HOURS PRN
Status: DISCONTINUED | OUTPATIENT
Start: 2017-11-11 | End: 2017-11-12 | Stop reason: HOSPADM

## 2017-11-11 RX ORDER — OXYCODONE HYDROCHLORIDE 10 MG/1
20 TABLET ORAL EVERY 6 HOURS PRN
Status: DISCONTINUED | OUTPATIENT
Start: 2017-11-11 | End: 2017-11-12 | Stop reason: HOSPADM

## 2017-11-11 RX ORDER — MORPHINE SULFATE 15 MG/1
15 TABLET ORAL EVERY 6 HOURS PRN
Status: DISCONTINUED | OUTPATIENT
Start: 2017-11-11 | End: 2017-11-12 | Stop reason: HOSPADM

## 2017-11-11 RX ADMIN — MINERAL SUPPLEMENT IRON 300 MG / 5 ML STRENGTH LIQUID 100 PER BOX UNFLAVORED 300 MG: at 08:51

## 2017-11-11 RX ADMIN — MYCOPHENOLATE MOFETIL 500 MG: 250 CAPSULE ORAL at 08:50

## 2017-11-11 RX ADMIN — MYCOPHENOLATE MOFETIL 500 MG: 250 CAPSULE ORAL at 20:21

## 2017-11-11 RX ADMIN — MORPHINE SULFATE 15 MG: 15 TABLET ORAL at 21:34

## 2017-11-11 RX ADMIN — MORPHINE SULFATE 15 MG: 15 TABLET ORAL at 04:22

## 2017-11-11 RX ADMIN — PRAVASTATIN SODIUM 20 MG: 20 TABLET ORAL at 20:21

## 2017-11-11 RX ADMIN — LACTULOSE 30 ML: 20 SOLUTION ORAL at 22:45

## 2017-11-11 RX ADMIN — LACTOBACILLUS TAB 2 TABLET: TAB at 08:50

## 2017-11-11 RX ADMIN — TIOTROPIUM BROMIDE 1 CAPSULE: 18 CAPSULE ORAL; RESPIRATORY (INHALATION) at 08:00

## 2017-11-11 RX ADMIN — FUROSEMIDE 40 MG: 40 TABLET ORAL at 08:51

## 2017-11-11 RX ADMIN — METHYLNALTREXONE BROMIDE 12 MG: 12 INJECTION, SOLUTION SUBCUTANEOUS at 06:29

## 2017-11-11 RX ADMIN — LACTOBACILLUS TAB 2 TABLET: TAB at 12:21

## 2017-11-11 RX ADMIN — LEVOTHYROXINE SODIUM 137 MCG: 137 TABLET ORAL at 06:20

## 2017-11-11 RX ADMIN — TRAZODONE HYDROCHLORIDE 50 MG: 50 TABLET ORAL at 20:22

## 2017-11-11 RX ADMIN — GABAPENTIN 800 MG: 400 CAPSULE ORAL at 08:50

## 2017-11-11 RX ADMIN — GABAPENTIN 800 MG: 400 CAPSULE ORAL at 20:20

## 2017-11-11 RX ADMIN — TACROLIMUS 1.5 MG: 0.5 CAPSULE ORAL at 20:25

## 2017-11-11 RX ADMIN — BISACODYL 10 MG: 10 SUPPOSITORY RECTAL at 08:51

## 2017-11-11 RX ADMIN — GABAPENTIN 800 MG: 400 CAPSULE ORAL at 15:23

## 2017-11-11 RX ADMIN — OMEPRAZOLE 40 MG: 20 CAPSULE, DELAYED RELEASE ORAL at 08:51

## 2017-11-11 RX ADMIN — SODIUM CHLORIDE: 9 INJECTION, SOLUTION INTRAVENOUS at 01:42

## 2017-11-11 RX ADMIN — MORPHINE SULFATE 15 MG: 15 TABLET ORAL at 15:23

## 2017-11-11 RX ADMIN — MAGNESIUM CITRATE 296 ML: 1.75 LIQUID ORAL at 18:10

## 2017-11-11 RX ADMIN — PREDNISONE 7 MG: 1 TABLET ORAL at 08:50

## 2017-11-11 RX ADMIN — LACTULOSE 30 ML: 20 SOLUTION ORAL at 14:08

## 2017-11-11 RX ADMIN — ENOXAPARIN SODIUM 40 MG: 100 INJECTION SUBCUTANEOUS at 08:51

## 2017-11-11 RX ADMIN — OXYCODONE HYDROCHLORIDE 20 MG: 10 TABLET ORAL at 20:11

## 2017-11-11 RX ADMIN — TADALAFIL 40 MG: 20 TABLET ORAL at 20:23

## 2017-11-11 RX ADMIN — OXYCODONE HYDROCHLORIDE 20 MG: 10 TABLET ORAL at 14:10

## 2017-11-11 RX ADMIN — ALPRAZOLAM 0.5 MG: 0.5 TABLET ORAL at 20:20

## 2017-11-11 RX ADMIN — SODIUM CHLORIDE: 9 INJECTION, SOLUTION INTRAVENOUS at 18:10

## 2017-11-11 RX ADMIN — ALPRAZOLAM 0.5 MG: 0.5 TABLET ORAL at 15:23

## 2017-11-11 RX ADMIN — OXYCODONE HYDROCHLORIDE AND ACETAMINOPHEN 500 MG: 500 TABLET ORAL at 08:50

## 2017-11-11 RX ADMIN — POTASSIUM CHLORIDE 60 MEQ: 1500 TABLET, EXTENDED RELEASE ORAL at 08:50

## 2017-11-11 RX ADMIN — AMBRISENTAN 10 MG: 10 TABLET, FILM COATED ORAL at 08:51

## 2017-11-11 RX ADMIN — ASPIRIN 81 MG: 81 TABLET, COATED ORAL at 08:50

## 2017-11-11 RX ADMIN — TACROLIMUS 1.5 MG: 0.5 CAPSULE ORAL at 08:51

## 2017-11-11 RX ADMIN — SERTRALINE HYDROCHLORIDE 100 MG: 50 TABLET ORAL at 20:22

## 2017-11-11 RX ADMIN — MORPHINE SULFATE 15 MG: 15 TABLET ORAL at 09:06

## 2017-11-11 RX ADMIN — LACTOBACILLUS TAB 2 TABLET: TAB at 18:10

## 2017-11-11 RX ADMIN — ALPRAZOLAM 0.5 MG: 0.5 TABLET ORAL at 08:50

## 2017-11-11 ASSESSMENT — PAIN SCALES - GENERAL
PAINLEVEL_OUTOF10: 9
PAINLEVEL_OUTOF10: 8
PAINLEVEL_OUTOF10: 6
PAINLEVEL_OUTOF10: 4
PAINLEVEL_OUTOF10: 7
PAINLEVEL_OUTOF10: 5
PAINLEVEL_OUTOF10: 5
PAINLEVEL_OUTOF10: 8
PAINLEVEL_OUTOF10: 9

## 2017-11-11 ASSESSMENT — ENCOUNTER SYMPTOMS
FEVER: 0
NAUSEA: 0
CHILLS: 0
CONSTIPATION: 1
VOMITING: 0
ABDOMINAL PAIN: 1

## 2017-11-11 NOTE — ASSESSMENT & PLAN NOTE
Pt has received Relistor, mag cirate, Dulcolax OR.  Nursing reports that she's had 3 BM's.  However, patient insists that they were very small and that she still has the abd discomfort.  She states she's willing to decrease narcotics as it is the likely cause of her constipation.  We will also provide mild/molassis enema.

## 2017-11-11 NOTE — PROGRESS NOTES
"Pt had a 4th medium loose BM, continues to c/o abdominal distention and states \"believe me, I know my body, that was small, there is a lot more to go.\"  "

## 2017-11-11 NOTE — CARE PLAN
Problem: Bowel/Gastric:  Goal: Normal bowel function is maintained or improved  Outcome: PROGRESSING AS EXPECTED  Pt has had a total of 3 bowel movements at this point (2 small during day shift per report and 1 moderate BM). Pt is still wanting further medication to treat constipation, discussed plan of suppository in AM if no further BMs. No current nausea. Encouraged ambulation, pt states she ambulated hallways earlier today.

## 2017-11-11 NOTE — CARE PLAN
Problem: Pain Management  Goal: Pain level will decrease to patient's comfort goal  Outcome: PROGRESSING AS EXPECTED  Pt medicated with home dose of Morphine PO per MAR for abdominal pain with improvement in pain level. She is currently sleeping.

## 2017-11-11 NOTE — PROGRESS NOTES
Hand off report given to day shift PENNY Mendoza, who will assume care of the pt at this time. Pt resting comfortably in bed, denies any current needs.

## 2017-11-11 NOTE — DISCHARGE PLANNING
Medical Social Work    Referral: PASTOR reviewed the chart this AM.      Intervention: Per flowsheet, pt lives with spouse.  Pt is currently on 4L O2 but no home O2 noted.  No SS or DC needs at this time.      Plan: PASTOR Bondsy available to assist with any d.c planning.

## 2017-11-11 NOTE — PROGRESS NOTES
"0700-Report from NOC RN. POC reviewed. Pt sitting up in bed, no needs at this time. Call light within reach.     0850-AM meds given to patient. Pt very anxious and tearful regarding plan for today (ie. Discharge). Pt state's \"I know my medicare rights and I know I have the right to stay here. If I get discharged I will just go to another ER.\" Pt c/o not having large, solid BM despite nursing charting. States \"those weren't BMs, that was a smear.\" Pt anxious to speak with rounding MD about POC. Discussed with Dr. Marley who will add additional orders for pt.   "

## 2017-11-11 NOTE — DISCHARGE SUMMARY
ADMISSION DATE:  11/09/2017    DIAGNOSES DATE:  11/10/2017    DISCHARGE DIAGNOSES:  1.  Opioid-induced obstipation.  2.  Hypokalemia.  3.  Pancytopenia.    OTHER DIAGNOSES:  Recent hospitalization for sepsis, hepatorenal syndrome,   pancytopenia, sarcoidosis, pulmonary hypertension, chronic pain syndrome with   narcotic dependence, hypothyroidism, and chronic kidney disease stage III.    ADMISSION HISTORY:  Please refer to admission note of 11/09/2017 for details.    HOSPITAL COURSE:  The patient is a 57-year-old female with history of multiple   medical problems including liver transplant history, chronic pain syndrome on   narcotics, and recent hospitalization for sepsis.  Patient now presents with   complaints of abdominal pain and constipation for the past 7-8 days.  Upon   admission, the patient was evaluated with abdominal x-ray with findings of   post-bariatric surgery and increased stool.  Patient was placed on aggressive   bowel regimen including mag citrate p.o. and per rectum Dulcolax as well as   Relistor.  With these, patient had small bowel movements.  With the   improvement in her symptoms, patient will be discharged home.  At the time of   the dictation, patient was feeling better, eating and drinking well, not   having bowel movements, and was hemodynamically stable.    CONSULTATIONS OBTAINED:  None.    PROCEDURES:  Abdominal x-ray on 11/09/2017.    DISCHARGE MEDICATIONS:  1.  Acetaminophen 650 mg q. 6 hours.  2.  Alprazolam 0.5 mg t.i.d.  3.  Ambrisentan 10 mg daily.  4.  Ascorbic acid 500 mg daily.  5.  Aspirin 81 mg daily.  6.  Dulcolax 10 mg per rectum daily p.r.n. constipation.  7.  Citracal maximum 2 tabs b.i.d.  8.  Cyclobenzaprine 10 mg t.i.d. for muscle spasm.  9.  Docusate 100 mg b.i.d.  10.  Ferrous sulfate 220 mg daily.  11.  Fluticasone 50 per actuation 1 spray to naris b.i.d.  12.  Furosemide 40 mg daily.  13.  Gabapentin 800 mg t.i.d.  14.  Levothyroxine 137 mcg daily.  15.   Linaclotide 290 mcg daily.  16.  Naltrexone 0.6 mL daily p.r.n. for constipation due to opiates.  17.  MSIR 15 mg q. 4 hours p.r.n. for severe pain.  18.  Mycophenolate 500 mg b.i.d.  19.  Naloxegol 25 mg daily.  20.  Omeprazole 40 mg daily.  21.  Zofran 4 mg q. 8 hours p.r.n. for nausea and vomiting.  22.  Oxycodone 10-30 mg q. 6 hours for moderate pain.  23.  Polyethylene glycol 1 g daily.  24.  Pravastatin 20 mg daily.  25.  Prednisone 7 mg daily.  26.  Probiotic 1 capsule daily.  27.  Sennoside 17.2 mg b.i.d.  28.  Sertraline 100 mg at bedtime.  29.  Tacrolimus 1.5 mg b.i.d.  30.  Tadalafil 40 mg at bedtime.  31.  Tiotropium 80 mcg inhaled daily.  32.  Trazodone  mg at bedtime.  33.  Wheat dextrin 3.8 g daily.    DISCHARGE DIET:  Regular diet.    DISCHARGE ACTIVITY:  Gradual increase in activity.    DISCHARGE FOLLOWUP:  With Bernarda Reyes, patient's primary care provider, in   approximately 2-3 weeks.       ____________________________________     MD SOTERO ANTOINE / SAVAGE    DD:  11/10/2017 13:44:30  DT:  11/11/2017 09:37:05    D#:  [id number]  Job#:  [id number]    cc: Bernarda Damon DO

## 2017-11-11 NOTE — CARE PLAN
Problem: Safety  Goal: Will remain free from injury  Outcome: PROGRESSING AS EXPECTED  Pt will remain free from injury. Pt has been educated on the call light and demonstrates its use appropriately. Fall precautions in place.     Problem: Bowel/Gastric:  Goal: Normal bowel function is maintained or improved  Outcome: PROGRESSING SLOWER THAN EXPECTED  Pt will have a large bowel movement. Bowel protocol in place. Enemas/suppositories/laxitives given to pt throughout day to promote BM.

## 2017-11-11 NOTE — PROGRESS NOTES
Pt has hyperactive bowel sounds x4, had 2 small bowel movements today but still reports feeling constipated. No nausea at this time. Discussed plan of suppository in AM per MD and encouraged ambulation. Pt agrees with the plan. Medicated per MAR.

## 2017-11-11 NOTE — PROGRESS NOTES
[facility]    Date of Service: 2017    Chief Complaint  57 y.o. female admitted 2017 with opioid induced constipation.    Interval Problem Update  Nursing noted 3 BM's but patient states the BM's were of no significance.  Refuses to leave as she feels she's still constipated.      Consultants/Specialty      Review of Systems   Constitutional: Negative for chills and fever.   Gastrointestinal: Positive for abdominal pain and constipation. Negative for nausea and vomiting.      Physical Exam  Laboratory/Imaging   Hemodynamics  Temp (24hrs), Av.1 °C (98.7 °F), Min:36.8 °C (98.3 °F), Max:37.1 °C (98.8 °F)   Temperature: 36.8 °C (98.3 °F)  Pulse  Av.1  Min: 52  Max: 75    Blood Pressure: 126/61      Respiratory      Respiration: 17, Pulse Oximetry: 95 %, O2 Daily Delivery Respiratory : Nasal Cannula     #MDI/DPI Given: MDI/DPI x 1  RUL Breath Sounds: Clear, RML Breath Sounds: Clear, RLL Breath Sounds: Diminished, MANJINDER Breath Sounds: Clear, LLL Breath Sounds: Diminished    Fluids    Intake/Output Summary (Last 24 hours) at 17 1248  Last data filed at 17 0850   Gross per 24 hour   Intake             1700 ml   Output                0 ml   Net             1700 ml       Nutrition  Orders Placed This Encounter   Procedures   • DIET ORDER     Standing Status:   Standing     Number of Occurrences:   1     Order Specific Question:   Diet:     Answer:   Clear Liquid [10]     Order Specific Question:   Diet:     Answer:   Diabetic [3]     Physical Exam  Nursing note and vitals reviewed.  Constitutional: She is oriented to person, place, and time. She appears well-developed and   well-nourished.   HENT:   Head: Normocephalic and atraumatic.   Right Ear: External ear normal.   Left Ear: External ear normal.   Nose: Nose normal.   Mouth/Throat: Oropharynx is small with Mallanpati score of 4.  Mucosa is clear and moist.   Eyes: Conjunctivae and extraocular motions are normal. Pupils  are equal, round, and reactive to light.   Neck: Normal range of motion. Neck supple.   Cardiovascular: Normal rate, regular rhythm, normal heart sounds and intact distal pulses.    Pulmonary/Chest: Effort normal and breath sounds normal.   Abdominal: Soft. Bowel sounds are normal.  Slight discomfort c deep palpation.  Musculoskeletal: Normal range of motion.   Neurological: She is alert and oriented to person, place, and time.   Skin: Skin is warm and dry.       Recent Labs      11/09/17   2132  11/10/17   0546   WBC  3.5*  2.7*   RBC  3.28*  3.36*   HEMOGLOBIN  9.5*  9.9*   HEMATOCRIT  29.7*  31.2*   MCV  90.5  92.9   MCH  29.0  29.5   MCHC  32.0*  31.7*   RDW  48.3  50.5*   PLATELETCT  96*  93*   MPV  8.6*  9.4     Recent Labs      11/09/17   2132  11/10/17   0547   SODIUM  135  143   POTASSIUM  3.5*  3.5*   CHLORIDE  101  105   CO2  27  31   GLUCOSE  91  91   BUN  9  10   CREATININE  1.05  1.08   CALCIUM  9.2  8.9                      Assessment/Plan     Status post liver transplant Dr. [place] ([facility])- (present on admission)   Assessment & Plan    Cont immunosuppressive.        Drug-induced constipation- (present on admission)   Assessment & Plan    Pt has received Relistor, mag cirate, Dulcolax ID.  Nursing reports that she's had 3 BM's.  However, patient insists that they were very small and that she still has the abd discomfort.  She states she's willing to decrease narcotics as it is the likely cause of her constipation.  We will also provide mild/molassis enema.        S/P panniculectomy- (present on admission)   Assessment & Plan    Wound care eval.        Chronic generalized abdominal pain- (present on admission)   Assessment & Plan    Judicious use of narcotics.  Follow c constipation management.        Stable issues - med hx (PHTN, hypothyoid, CKD 3, sarcoid, recent sepsis),  Preventives - IS, Vax, stool soft, DVTP.  Dispo - complex/guarded.      Reviewed items::  Radiology images reviewed, Labs  reviewed and Medications reviewed  Holbrook catheter::  No Holbrook  DVT prophylaxis pharmacological::  Enoxaparin (Lovenox)  Ulcer Prophylaxis::  Not indicated

## 2017-11-11 NOTE — PROGRESS NOTES
Molasses enema given at approximately 11:15. Pt reports no relief from enema and reports increased abdominal pain/discomfort. Dr. Marley notified. Orders received to start lactulose.

## 2017-11-12 VITALS
OXYGEN SATURATION: 98 % | BODY MASS INDEX: 26.63 KG/M2 | HEIGHT: 68 IN | SYSTOLIC BLOOD PRESSURE: 121 MMHG | DIASTOLIC BLOOD PRESSURE: 64 MMHG | WEIGHT: 175.71 LBS | HEART RATE: 65 BPM | TEMPERATURE: 98.1 F | RESPIRATION RATE: 16 BRPM

## 2017-11-12 PROCEDURE — A9270 NON-COVERED ITEM OR SERVICE: HCPCS | Performed by: HOSPITALIST

## 2017-11-12 PROCEDURE — 99217 PR OBSERVATION CARE DISCHARGE: CPT | Performed by: HOSPITALIST

## 2017-11-12 PROCEDURE — 700111 HCHG RX REV CODE 636 W/ 250 OVERRIDE (IP): Performed by: HOSPITALIST

## 2017-11-12 PROCEDURE — G0378 HOSPITAL OBSERVATION PER HR: HCPCS

## 2017-11-12 PROCEDURE — 700111 HCHG RX REV CODE 636 W/ 250 OVERRIDE (IP): Performed by: FAMILY MEDICINE

## 2017-11-12 PROCEDURE — 700102 HCHG RX REV CODE 250 W/ 637 OVERRIDE(OP): Performed by: FAMILY MEDICINE

## 2017-11-12 PROCEDURE — 700102 HCHG RX REV CODE 250 W/ 637 OVERRIDE(OP): Performed by: HOSPITALIST

## 2017-11-12 PROCEDURE — A9270 NON-COVERED ITEM OR SERVICE: HCPCS | Performed by: FAMILY MEDICINE

## 2017-11-12 PROCEDURE — 96376 TX/PRO/DX INJ SAME DRUG ADON: CPT

## 2017-11-12 RX ORDER — LACTULOSE 20 G/30ML
30 SOLUTION ORAL 3 TIMES DAILY PRN
Qty: 30 EACH | Refills: 1 | Status: SHIPPED | OUTPATIENT
Start: 2017-11-12 | End: 2017-11-27 | Stop reason: SDUPTHER

## 2017-11-12 RX ORDER — TIOTROPIUM BROMIDE 18 UG/1
1 CAPSULE ORAL; RESPIRATORY (INHALATION) DAILY
Status: DISCONTINUED | OUTPATIENT
Start: 2017-11-12 | End: 2017-11-12 | Stop reason: HOSPADM

## 2017-11-12 RX ORDER — BISACODYL 10 MG
10 SUPPOSITORY, RECTAL RECTAL PRN
Qty: 30 SUPPOSITORY | Refills: 1 | Status: SHIPPED | OUTPATIENT
Start: 2017-11-12 | End: 2017-12-29

## 2017-11-12 RX ORDER — OXYCODONE HYDROCHLORIDE 10 MG/1
10-30 TABLET ORAL EVERY 6 HOURS PRN
Qty: 90 TAB | Refills: 0
Start: 2017-11-12 | End: 2017-12-29 | Stop reason: SDUPTHER

## 2017-11-12 RX ADMIN — ONDANSETRON 4 MG: 2 INJECTION, SOLUTION INTRAMUSCULAR; INTRAVENOUS at 08:28

## 2017-11-12 RX ADMIN — AMBRISENTAN 10 MG: 10 TABLET, FILM COATED ORAL at 12:26

## 2017-11-12 RX ADMIN — MYCOPHENOLATE MOFETIL 500 MG: 250 CAPSULE ORAL at 08:08

## 2017-11-12 RX ADMIN — MINERAL SUPPLEMENT IRON 300 MG / 5 ML STRENGTH LIQUID 100 PER BOX UNFLAVORED 300 MG: at 08:11

## 2017-11-12 RX ADMIN — MORPHINE SULFATE 15 MG: 15 TABLET ORAL at 05:05

## 2017-11-12 RX ADMIN — OXYCODONE HYDROCHLORIDE 20 MG: 10 TABLET ORAL at 12:43

## 2017-11-12 RX ADMIN — ALPRAZOLAM 0.5 MG: 0.5 TABLET ORAL at 08:06

## 2017-11-12 RX ADMIN — OXYCODONE HYDROCHLORIDE AND ACETAMINOPHEN 500 MG: 500 TABLET ORAL at 08:08

## 2017-11-12 RX ADMIN — PREDNISONE 7 MG: 1 TABLET ORAL at 08:13

## 2017-11-12 RX ADMIN — OXYCODONE HYDROCHLORIDE 20 MG: 10 TABLET ORAL at 06:41

## 2017-11-12 RX ADMIN — OMEPRAZOLE 40 MG: 20 CAPSULE, DELAYED RELEASE ORAL at 08:09

## 2017-11-12 RX ADMIN — LEVOTHYROXINE SODIUM 137 MCG: 137 TABLET ORAL at 05:47

## 2017-11-12 RX ADMIN — BISACODYL 10 MG: 10 SUPPOSITORY RECTAL at 08:24

## 2017-11-12 RX ADMIN — MORPHINE SULFATE 15 MG: 15 TABLET ORAL at 12:43

## 2017-11-12 RX ADMIN — TIOTROPIUM BROMIDE 1 CAPSULE: 18 CAPSULE ORAL; RESPIRATORY (INHALATION) at 08:18

## 2017-11-12 RX ADMIN — ENOXAPARIN SODIUM 40 MG: 100 INJECTION SUBCUTANEOUS at 08:14

## 2017-11-12 RX ADMIN — GABAPENTIN 800 MG: 400 CAPSULE ORAL at 08:07

## 2017-11-12 RX ADMIN — TACROLIMUS 1.5 MG: 0.5 CAPSULE ORAL at 08:11

## 2017-11-12 RX ADMIN — ASPIRIN 81 MG: 81 TABLET, COATED ORAL at 08:07

## 2017-11-12 RX ADMIN — LACTOBACILLUS TAB 2 TABLET: TAB at 08:06

## 2017-11-12 ASSESSMENT — PAIN SCALES - GENERAL
PAINLEVEL_OUTOF10: 9
PAINLEVEL_OUTOF10: 9

## 2017-11-12 NOTE — DISCHARGE INSTRUCTIONS
Discharge Instructions    Discharged to home by car with relative. Discharged via wheelchair, hospital escort: Yes.  Special equipment needed: Not Applicable    Be sure to schedule a follow-up appointment with your primary care doctor or any specialists as instructed.     Discharge Plan: Home  Diet Plan: Discussed  Activity Level: Discussed  Confirmed Follow up Appointment: Patient to Call and Schedule Appointment  Confirmed Symptoms Management: Discussed  Medication Reconciliation Updated: No (Comments)  Influenza Vaccine Indication: Not indicated: Previously immunized this influenza season and > 8 years of age    I understand that a diet low in cholesterol, fat, and sodium is recommended for good health. Unless I have been given specific instructions below for another diet, I accept this instruction as my diet prescription.   Other diet: advance slowly as desired    Special Instructions: None    · Is patient discharged on Warfarin / Coumadin?   No     · Is patient Post Blood Transfusion?  No        Depression / Suicide Risk    As you are discharged from this [facility] facility, it is important to learn how to keep safe from harming yourself.    Recognize the warning signs:  · Abrupt changes in personality, positive or negative- including increase in energy   · Giving away possessions  · Change in eating patterns- significant weight changes-  positive or negative  · Change in sleeping patterns- unable to sleep or sleeping all the time   · Unwillingness or inability to communicate  · Depression  · Unusual sadness, discouragement and loneliness  · Talk of wanting to die  · Neglect of personal appearance   · Rebelliousness- reckless behavior  · Withdrawal from people/activities they love  · Confusion- inability to concentrate     If you or a loved one observes any of these behaviors or has concerns about self-harm, here's what you can do:  · Talk about it- your feelings and reasons for harming yourself  · Remove  any means that you might use to hurt yourself (examples: pills, rope, extension cords, firearm)  · Get professional help from the community (Mental Health, Substance Abuse, psychological counseling)  · Do not be alone:Call your Safe Contact- someone whom you trust who will be there for you.  · Call your local CRISIS HOTLINE 815-7565 or 805-672-9015  · Call your local Children's Mobile Crisis Response Team [place] (835) 182-6387 or www.Audemat  · Call the toll free National Suicide Prevention Hotlines   · National Suicide Prevention Lifeline 087-201-CXGH (2249)  · National Hope Line Network 800-SUICIDE (326-5026)

## 2017-11-12 NOTE — PROGRESS NOTES
Per Dr. Marley patient will be discharged today.  IV stopped and removed.  Awaiting orders to process discharge.

## 2017-11-12 NOTE — PROGRESS NOTES
Patient continues to have watery brown stools.  No formed stool this shift.  Patient remains on CLQ diet and tolerating well.

## 2017-11-12 NOTE — DISCHARGE SUMMARY
Since the transfer summary was dictated on 11/10//2017 patient remained in the hospital awaiting to have a satisfactory BM.  She was given lactulose, milk/molasses enema and suppositories c good effect.  However, she continued to deny having good BM's.  She final stated she had enough and said she wanted to go.  She remained hemodynamically stable.  She is to continue with the follow up as outlined in the previous summary and updated medication list as below.    DISCHARGE PROBLEM LIST  Active Problems:    Status post liver transplant Dr. Canada ([facility]) POA: Yes      Overview: -December 2011: Status post liver transplant for end stage liver disease       at [facility], followed by Dr. Canada.          Pancytopenia (CMS-HCC) POA: Yes    Chronic generalized abdominal pain POA: Yes    S/P panniculectomy POA: Yes    Drug-induced constipation POA: Yes  Resolved Problems:    * No resolved hospital problems. *      FOLLOW UP  Future Appointments  Date Time Provider Department Center   11/13/2017 [facility] Determined [facility] [place] [facility] None   11/16/2017 To Be Determined [place] TRISHA Morfin, R.N. [facility] None   11/20/2017 To Be Determined Stacie TRISHA Morfin, R.N. [facility] None   11/22/2017 1:00 PM Gwyn Gaming M.D. [facility] None   11/23/2017 To Be Determined [place] TRISHA Morfin, R.N. [facility] None   11/27/2017 To Be Determined Stacie TRISHA Morfin, R.N. [facility] None   11/30/2017 To Be Determined Stacie TRISHA Morfin R.N. [facility] None   12/4/2017 To Be Determined Radha Teruya, R.N. [facility] None   12/6/2017 8:00 AM [facility] None   12/11/2017 To Be Determined Radha Teruya, R.N. [facility] None   12/18/2017 To Be Determined Radha Teruya, R.N. [facility] None   12/25/2017 To Be Determined Radha Teruya, R.N. [facility] None   1/1/2018 To Be Determined Radha Teruya, R.N. [facility] None   1/3/2018 8:00 AM [facility] None   1/22/2018 12:00 PM Bernarda Damon D.O. [facility] MAD[facility]   2/7/2018 8:00 AM [facility] None   3/7/2018 8:00 AM [facility]  None     Bernarda Damon D.O.  [address]  [address]  [facility] [place] [id number] [phone]      As needed      MEDICATIONS ON DISCHARGE   Roxana Cuba   Home Medication Instructions MORELIA:[id number]    Printed on:11/12/17 8881   Medication Information                      acetaminophen (TYLENOL) 325 MG Tab  Take 2 Tabs by mouth every 6 hours.             alprazolam (XANAX) 0.5 MG Tab  Take 1 Tab by mouth 3 times a day.             ambrisentan (LETAIRIS) 10 MG tablet  Take 1 Tab by mouth every day.             ascorbic acid (ASCORBIC ACID) 500 MG Tab  Take 500 mg by mouth every day.             aspirin 81 MG tablet  Take 81 mg by mouth every day.             bisacodyl (DULCOLAX) 10 MG Suppos  Insert 1 Suppository in rectum 1 time daily as needed (constipation).             bisacodyl (DULCOLAX) 10 MG Suppos  Insert 1 Suppository in rectum as needed (constipation).             CITRACAL MAXIMUM 315-250 MG-UNIT Tab  TAKE 2 TABS BY MOUTH 2X/ DAY WITH FOOD BEFORE AND AFTER DINNER FOR LIFE-CRUSH 1ST 3 MONTH, SPACE MVI             cyclobenzaprine (FLEXERIL) 10 MG Tab  Take 10 mg by mouth 3 times a day as needed for Muscle Spasms.             docusate sodium (COLACE) 100 MG Cap  Take 100 mg by mouth 2 times a day.             ferrous sulfate (FEOSOL) 220 (44 FE) MG/5ML Elixir  Take 5 mL by mouth every day.             fluticasone (FLONASE) 50 MCG/ACT nasal spray  SPRAY 1 SPRAY IN EACH NOSTRIL TWICE A DAY             gabapentin (NEURONTIN) 800 MG tablet  Take 1 Tab by mouth 3 times a day.             lactulose 20 GM/30ML Solution  Take 30 mL by mouth 3 times a day as needed (constipation).             levothyroxine (SYNTHROID) 137 MCG Tab  Take 137 mcg by mouth Every morning on an empty stomach.             Linaclotide 290 MCG Cap  Take 290 mg by mouth every day.             methylnaltrexone (RELISTOR) 12 MG/0.6ML Solution  Inject 0.6 mL as instructed 1 time daily as needed for up to 30 days.             morphine  (MS IR) 15 MG tablet  Take 1 Tab by mouth every four hours as needed for Severe Pain.             mycophenolate (CELLCEPT) 250 MG Cap  Take 500 mg by mouth 2 times a day.             Naloxegol Oxalate 25 MG Tab  Take 25 mg by mouth every day.             NON SPECIFIED  Take 1 Cap by mouth every evening. Bariatric Dietary Supplment              non-formulary med  Inhale 5 L by mouth Continuous. Oxygen             omeprazole (PRILOSEC) 40 MG delayed-release capsule  Take 1 Cap by mouth every day.             ondansetron (ZOFRAN ODT) 4 MG TABLET DISPERSIBLE  Take 1 Tab by mouth every 8 hours as needed for Nausea/Vomiting. Nausea and vomiting             oxycodone immediate release (ROXICODONE) 10 MG immediate release tablet  Take 1-3 Tabs by mouth every 6 hours as needed for Severe Pain.             polyethylene glycol 3350 (MIRALAX) Powder  Take 1 g by mouth every day.             pravastatin (PRAVACHOL) 20 MG Tab  TAKE 1 TAB BY MOUTH EVERY DAY.             predniSONE (DELTASONE) 1 MG Tab  Take 7 Tabs by mouth every day.             Probiotic Product (PROBIOTIC DAILY PO)  Take 1 Cap by mouth every day.             sennosides (SENOKOT) 8.6 MG Tab  Take 17.2 mg by mouth 2 times a day.             sertraline (ZOLOFT) 100 MG Tab  Take 100 mg by mouth every bedtime.             tacrolimus (PROGRAF) 0.5 MG Cap  Take 1.5 mg by mouth 2 Times a Day. Pt uses a 1 mg and 0.5 mg cap              Tadalafil, PAH, (ADCIRCA) 20 MG Tab  Take 40 mg by mouth every bedtime. Indications: Pulmonary Arterial Hypertension             tiotropium (SPIRIVA) 18 MCG Cap  Inhale 1 Cap by mouth every day.             trazodone (DESYREL) 50 MG Tab  TAKE 1-2 TABS BY MOUTH EVERY BEDTIME.             Wheat Dextrin (BENEFIBER) Powder  Take 3.8 g by mouth every day.                 DIET  Orders Placed This Encounter   Procedures   • DIET ORDER     Standing Status:   Standing     Number of Occurrences:   1     Order Specific Question:   Diet:     Answer:    Clear Liquid [10]     Order Specific Question:   Diet:     Answer:   Diabetic [3]         LABORATORY  Lab Results   Component Value Date/Time    SODIUM 143 11/10/2017 05:47 AM    POTASSIUM 3.5 (L) 11/10/2017 05:47 AM    CHLORIDE 105 11/10/2017 05:47 AM    CO2 31 11/10/2017 05:47 AM    GLUCOSE 91 11/10/2017 05:47 AM    BUN 10 11/10/2017 05:47 AM    CREATININE 1.08 11/10/2017 05:47 AM        Lab Results   Component Value Date/Time    WBC 2.7 (L) 11/10/2017 05:46 AM    HEMOGLOBIN 9.9 (L) 11/10/2017 05:46 AM    HEMATOCRIT 31.2 (L) 11/10/2017 05:46 AM    PLATELETCT 93 (L) 11/10/2017 05:46 AM        Total time of the discharge process exceeds 40 min.

## 2017-11-12 NOTE — PROGRESS NOTES
"Patient states desire to be discharged \"first thing in the morning\".  Explained to patient that it might be difficult for the hospitalist to see patient that early and discharge her \"first thing in the morning\".    "

## 2017-11-12 NOTE — PROGRESS NOTES
Pt reports no BM after lactulose administration. Dr. Marley updated. Orders received for abdominal xray and mag citrate administration.

## 2017-11-12 NOTE — PROGRESS NOTES
Assumed care of patient at 1900.  Patient is alert and oriented, resting in bed with the TV on.  Patient denies needs at this time and states she just finished the mag citrate.  Will monitor for BMs.  Hourly rounding continues.

## 2017-11-12 NOTE — CARE PLAN
Problem: Pain Management  Goal: Pain level will decrease to patient's comfort goal  Outcome: PROGRESSING AS EXPECTED   11/11/17 2134 11/11/17 5998   OTHER   Nurse Pain Scale 0 - 10  8 --    Non Verbal Scale  --  Calm;Sleeping;Unlabored Breathing   Comfort Goal Comfort at Rest;Comfort with Movement --        Problem: Venous Thromboembolism (VTW)/Deep Vein Thrombosis (DVT) Prevention:  Goal: Patient will participate in Venous Thrombosis (VTE)/Deep Vein Thrombosis (DVT)Prevention Measures   11/11/17 2015   Mechanical/VTE Prophylaxis   Mechanical Prophylaxis  SCDs, Sequential Compression Device   SCDs, Sequential Compression Device On   OTHER   Risk Assessment Score 2   VTE RISK Moderate   Pharmacologic Prophylaxis Used LMWH: Enoxaparin(Lovenox)

## 2017-11-13 ENCOUNTER — HOME CARE VISIT (OUTPATIENT)
Dept: HOME HEALTH SERVICES | Facility: HOME HEALTHCARE | Age: 57
End: 2017-11-13
Payer: MEDICARE

## 2017-11-13 VITALS
HEART RATE: 66 BPM | OXYGEN SATURATION: 96 % | TEMPERATURE: 98.9 F | SYSTOLIC BLOOD PRESSURE: 101 MMHG | DIASTOLIC BLOOD PRESSURE: 60 MMHG

## 2017-11-13 PROCEDURE — G0299 HHS/HOSPICE OF RN EA 15 MIN: HCPCS

## 2017-11-13 SDOH — ECONOMIC STABILITY: HOUSING INSECURITY: UNSAFE APPLIANCES: 0

## 2017-11-13 SDOH — ECONOMIC STABILITY: HOUSING INSECURITY: UNSAFE COOKING RANGE AREA: 0

## 2017-11-13 SDOH — ECONOMIC STABILITY: HOUSING INSECURITY: HOME SAFETY: PT HAS A CAT, LONG OXYGEN TUBING

## 2017-11-13 NOTE — PROGRESS NOTES
"Discharged earlier today. At discharge patient was tolerating clear liquids with no emesis.  Having liquid brown stools but patient thinks she is still \"obstructed.\"  "

## 2017-11-16 ENCOUNTER — HOME CARE VISIT (OUTPATIENT)
Dept: HOME HEALTH SERVICES | Facility: HOME HEALTHCARE | Age: 57
End: 2017-11-16
Payer: MEDICARE

## 2017-11-16 PROCEDURE — G0299 HHS/HOSPICE OF RN EA 15 MIN: HCPCS

## 2017-11-16 PROCEDURE — G0180 MD CERTIFICATION HHA PATIENT: HCPCS | Performed by: FAMILY MEDICINE

## 2017-11-16 ASSESSMENT — ACTIVITIES OF DAILY LIVING (ADL): OASIS_M1830: 03

## 2017-11-17 RX ORDER — SERTRALINE HYDROCHLORIDE 100 MG/1
100 TABLET, FILM COATED ORAL
Qty: 90 TAB | Refills: 4 | Status: SHIPPED | OUTPATIENT
Start: 2017-11-17 | End: 2017-12-29 | Stop reason: SDUPTHER

## 2017-11-19 VITALS
TEMPERATURE: 98.2 F | HEART RATE: 62 BPM | WEIGHT: 172 LBS | RESPIRATION RATE: 18 BRPM | SYSTOLIC BLOOD PRESSURE: 111 MMHG | DIASTOLIC BLOOD PRESSURE: 67 MMHG | OXYGEN SATURATION: 97 % | BODY MASS INDEX: 26.15 KG/M2

## 2017-11-19 SDOH — ECONOMIC STABILITY: HOUSING INSECURITY: UNSAFE COOKING RANGE AREA: 0

## 2017-11-19 SDOH — ECONOMIC STABILITY: HOUSING INSECURITY: UNSAFE APPLIANCES: 0

## 2017-11-21 ENCOUNTER — HOME CARE VISIT (OUTPATIENT)
Dept: HOME HEALTH SERVICES | Facility: HOME HEALTHCARE | Age: 57
End: 2017-11-21
Payer: MEDICARE

## 2017-11-21 VITALS
BODY MASS INDEX: 26.03 KG/M2 | HEART RATE: 62 BPM | SYSTOLIC BLOOD PRESSURE: 110 MMHG | DIASTOLIC BLOOD PRESSURE: 60 MMHG | WEIGHT: 171.2 LBS | OXYGEN SATURATION: 99 % | RESPIRATION RATE: 18 BRPM | TEMPERATURE: 98 F

## 2017-11-21 PROCEDURE — G0299 HHS/HOSPICE OF RN EA 15 MIN: HCPCS

## 2017-11-21 SDOH — ECONOMIC STABILITY: HOUSING INSECURITY: HOME SAFETY: HAS A STEEP STAIRS, HAS A PET CAT

## 2017-11-21 SDOH — ECONOMIC STABILITY: HOUSING INSECURITY: UNSAFE COOKING RANGE AREA: 0

## 2017-11-21 SDOH — ECONOMIC STABILITY: HOUSING INSECURITY: UNSAFE APPLIANCES: 0

## 2017-11-22 ENCOUNTER — OFFICE VISIT (OUTPATIENT)
Dept: PULMONOLOGY | Facility: HOSPICE | Age: 57
End: 2017-11-22
Payer: MEDICARE

## 2017-11-22 ENCOUNTER — TELEPHONE (OUTPATIENT)
Dept: CARDIOLOGY | Facility: MEDICAL CENTER | Age: 57
End: 2017-11-22

## 2017-11-22 VITALS
OXYGEN SATURATION: 97 % | DIASTOLIC BLOOD PRESSURE: 78 MMHG | HEART RATE: 72 BPM | HEIGHT: 68 IN | WEIGHT: 170 LBS | TEMPERATURE: 98.4 F | SYSTOLIC BLOOD PRESSURE: 122 MMHG | RESPIRATION RATE: 16 BRPM | BODY MASS INDEX: 25.76 KG/M2

## 2017-11-22 DIAGNOSIS — Z94.4 STATUS POST LIVER TRANSPLANT (HCC): ICD-10-CM

## 2017-11-22 DIAGNOSIS — I27.20 PULMONARY HYPERTENSION (HCC): ICD-10-CM

## 2017-11-22 DIAGNOSIS — R09.02 HYPOXEMIA: ICD-10-CM

## 2017-11-22 DIAGNOSIS — G47.33 OBSTRUCTIVE SLEEP APNEA: ICD-10-CM

## 2017-11-22 DIAGNOSIS — F41.9 ANXIETY: ICD-10-CM

## 2017-11-22 DIAGNOSIS — K76.81 HEPATOPULMONARY SYNDROME (HCC): ICD-10-CM

## 2017-11-22 DIAGNOSIS — D86.9 SARCOIDOSIS: ICD-10-CM

## 2017-11-22 DIAGNOSIS — D84.9 IMMUNOCOMPROMISED STATE (HCC): ICD-10-CM

## 2017-11-22 DIAGNOSIS — Z98.890 HISTORY OF LUNG BIOPSY: ICD-10-CM

## 2017-11-22 DIAGNOSIS — Q21.11 OSTIUM SECUNDUM TYPE ATRIAL SEPTAL DEFECT: ICD-10-CM

## 2017-11-22 PROCEDURE — 99214 OFFICE O/P EST MOD 30 MIN: CPT | Performed by: INTERNAL MEDICINE

## 2017-11-22 RX ORDER — GABAPENTIN 600 MG/1
600 TABLET ORAL 3 TIMES DAILY
Refills: 4 | COMMUNITY
Start: 2017-11-10 | End: 2017-12-29

## 2017-11-22 RX ORDER — FERROUS SULFATE, DRIED 100 %
POWDER (GRAM) MISCELLANEOUS
Status: ON HOLD | COMMUNITY
End: 2018-01-20

## 2017-11-22 RX ORDER — OXYCODONE HYDROCHLORIDE 10 MG/1
15 TABLET ORAL
COMMUNITY
Start: 2017-10-25 | End: 2017-12-29 | Stop reason: SDUPTHER

## 2017-11-22 RX ORDER — DIAPER,BRIEF,ADULT, DISPOSABLE
EACH MISCELLANEOUS
COMMUNITY
Start: 2015-12-13 | End: 2017-11-30 | Stop reason: CLARIF

## 2017-11-22 RX ORDER — BLOOD-GLUCOSE METER
KIT MISCELLANEOUS
Refills: 2 | COMMUNITY
Start: 2017-10-14 | End: 2017-12-29 | Stop reason: SDUPTHER

## 2017-11-22 RX ORDER — TACROLIMUS 1 MG/1
CAPSULE ORAL
Refills: 11 | COMMUNITY
Start: 2017-10-26 | End: 2018-04-04

## 2017-11-22 NOTE — TELEPHONE ENCOUNTER
----- Message from Kesha Harris R.N. sent at 11/22/2017  1:57 PM PST -----  Regarding: RE: RX request  PCP ordered    PCP:  Bernarda Damon D.O.    See notes  ----- Message -----  From: Beverley Andrade, Med Ass't  Sent: 11/22/2017   1:44 PM  To: Kesha Harris R.N.  Subject: RX request                                       Refill request for furosemide 40mg (90 day supply)  Pharmacy is [id number]    Not located on med list okay to fill?

## 2017-11-24 ENCOUNTER — HOME CARE VISIT (OUTPATIENT)
Dept: HOME HEALTH SERVICES | Facility: HOME HEALTHCARE | Age: 57
End: 2017-11-24
Payer: MEDICARE

## 2017-11-24 PROCEDURE — G0299 HHS/HOSPICE OF RN EA 15 MIN: HCPCS

## 2017-11-27 ENCOUNTER — HOME CARE VISIT (OUTPATIENT)
Dept: HOME HEALTH SERVICES | Facility: HOME HEALTHCARE | Age: 57
End: 2017-11-27
Payer: MEDICARE

## 2017-11-27 DIAGNOSIS — K59.03 DRUG-INDUCED CONSTIPATION: ICD-10-CM

## 2017-11-27 PROCEDURE — G0299 HHS/HOSPICE OF RN EA 15 MIN: HCPCS

## 2017-11-27 RX ORDER — LACTULOSE 20 G/30ML
30 SOLUTION ORAL 3 TIMES DAILY PRN
Qty: 300 ML | Refills: 1 | Status: SHIPPED | OUTPATIENT
Start: 2017-11-27 | End: 2017-12-06

## 2017-11-27 NOTE — PROGRESS NOTES
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 She has a very complex history. She has a history of biopsy-proven sarcoidosis. She also has a history of cirrhosis status post liver transplantation in December 2011 with chronic immunosuppression. Her medications include mycophenolate, tacrolimus, and prednisone at 7 mg per day. She has a history of hepatopulmonary syndrome with pulmonary hypertension and possible microvascular shunting. She has had an ASD repair that was done percutaneously. She has been hospitalized multiple times for various infections including pneumonia and tracheobronchitis. There is possibility that some of these events are due to microaspiration. She is on supplemental oxygen in the range of 5-6 L/m via nasal cannula. She has a history of complex sleep apnea. She has been treated with auto SV. She has a history of pulmonary hypertension with improvement on letairis and tadalafil. She has had previous bariatric surgery.  She was hospitalized at [facility]. She had bronchoscopy and eventually a wedge biopsy of the right lung which was initially interpreted at [facility] and [facility] as consistent with bronchiolitis obliterans. She has been followed by Dr. Arango at [facility] for her sarcoidosis. Her case was presented at a multidisciplinary conference and it was felt that the pathology was not consistent with bronchiolitis obliterans or any other interstitial lung disease. She recommended to continue follow-up with pulmonary function testing and CT scanning as indicated.  A shunt evaluation at [facility] indicated a 14% shunt..     Because of the possibility of microaspiration she has been on Prilosec. She actually feels much better over the past several months. She was hospitalized briefly with a small bowel obstruction in January of this year. This was treated conservatively with an NG tube and  "resolved. She also had a breast biopsy recently which was benign.     She was not able to travel to [facility] this summer because of repeat hospitalization with sepsis. Her oxygen needs here in [place] on range of motion 5-6 L/m. When she does go to the [place] she was able to reduce her O2 supplementation to about 3 L/m. When she drives over [employer] she finds that she needs to increase her oxygen to about 7 L/m to maintain adequate saturations.   Today she is at baseline and is comfortable on supplemental oxygen. She is in no acute distress.    Since we last saw her she had abdominal surgery. She had a panniculectomy done at [facility]. Postoperatively she had some areas of localized infection that required drains. She also had problems with severe constipation that required Relistor. 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  She has been able to obtain her pulmonary hypertension medications through several grants. There is some issue with funding of the grants. She is afraid that she will lose her coverage for ambrisentan and tadalafil. She is quite tearful at the prospect.        Past Medical History:   Diagnosis Date   • Breath shortness    • Bronchitis      2016   • Cardiomegaly    • Chronic fatigue and malaise    • Cirrhosis (CMS-HCC) December 2011    Status post liver transplant at [facility]   • CKD (chronic kidney disease) stage 3, GFR 30-59 ml/min    • Diabetes (CMS-HCC)     reports hx of, resolved w/weight loss. Reports still checking bloodsugars twice daily and using insulin PRN   • Fracture of left foot    • GERD (gastroesophageal reflux disease)         • H/O Clostridium difficile infection 02-17-17    reports \"16 months ago\". Current stool sample 01-26-17 neg   • Hypothyroid    • Low back pain 02-17-17    and left foot, 7/10   • Mild aortic regurgitation and aortic valve sclerosis     • On home oxygen therapy     4 " Dr. Amberly staley   • Platelet disorder (CMS-[facility])     low platelets   • Pneumonia     aspiration,    • Psychiatric disorder     Mood disorder   • Pulmonary hypertension        • S/P cholecystectomy    • Small bowel obstruction     01-   • Splenomegaly    • VRE (vancomycin-resistant Enterococci)     02-17-17, pt declines knowledge of this       ROS:   Constitutional: Denies fevers, chills, night sweats,  or weight loss.She has chronic fatigue  Eyes: Denies vision loss, pain, drainage, double vision  Ears, Nose, Throat: Denies earache, tinnitus, hoarseness  Cardiovascular: Denies chest pain, tightness, palpitations  Respiratory: See history of present illness  Sleep: She is on ASV  GI: Recent abdominal surgery with drains and prolonged constipation  : Denies frequent urination, hematuria, painful urination  Musculoskeletal: Denies back pain, painful joints, sore muscles  Neurological: Denies headaches, seizures  Skin: Denies rashes, color changes  Psychiatric: Denies depression or thoughts of suicide. Significant anxiety over her medical situation  Hematologic: Denies bleeding tendency or clotting tendency  Allergic/Immunologic: Denies rhinitis, skin sensitivity    Social History     Social History   • Marital status:      Spouse name: N/A   • Number of children: N/A   • Years of education: N/A     Occupational History   • Not on file.     Social History Main Topics   • Smoking status: Passive Smoke Exposure - Never Smoker   • Smokeless tobacco: Never Used      Comment: avoid all tobacco products   • Alcohol use No      Comment: 05/2009 quit drinking   • Drug use: No   • Sexual activity: Not on file     Other Topics Concern   • Not on file     Social History Narrative   • No narrative on file     Rhubarb; Organic nitrates; and Vancomycin hcl  Current Outpatient Prescriptions on File Prior to Visit   Medication Sig Dispense Refill   • sertraline (ZOLOFT) 100 MG Tab Take 1 Tab by mouth every  bedtime. 90 Tab 4   • oxycodone immediate release (ROXICODONE) 10 MG immediate release tablet Take 1-3 Tabs by mouth every 6 hours as needed for Severe Pain. 90 Tab 0   • bisacodyl (DULCOLAX) 10 MG Suppos Insert 1 Suppository in rectum 1 time daily as needed (constipation). 30 Suppository 1   • aspirin 81 MG tablet Take 81 mg by mouth every day.     • methylnaltrexone (RELISTOR) 12 MG/0.6ML Solution Inject 0.6 mL as instructed 1 time daily as needed for up to 30 days. 18 mL 2   • alprazolam (XANAX) 0.5 MG Tab Take 1 Tab by mouth 3 times a day. 30 Tab 0   • gabapentin (NEURONTIN) 800 MG tablet Take 1 Tab by mouth 3 times a day. 90 Tab 3   • tacrolimus (PROGRAF) 0.5 MG Cap Take 1.5 mg by mouth 2 Times a Day. Pt uses a 1 mg and 0.5 mg cap      • predniSONE (DELTASONE) 1 MG Tab Take 7 Tabs by mouth every day. 7 Tab 0   • Wheat Dextrin (BENEFIBER) Powder Take 3.8 g by mouth every day.     • polyethylene glycol 3350 (MIRALAX) Powder Take 1 g by mouth every day.     • levothyroxine (SYNTHROID) 137 MCG Tab Take 137 mcg by mouth Every morning on an empty stomach.     • ambrisentan (LETAIRIS) 10 MG tablet Take 1 Tab by mouth every day. 30 Tab 11   • pravastatin (PRAVACHOL) 20 MG Tab TAKE 1 TAB BY MOUTH EVERY DAY. 30 Tab 11   • fluticasone (FLONASE) 50 MCG/ACT nasal spray SPRAY 1 SPRAY IN EACH NOSTRIL TWICE A DAY 48 g 1   • trazodone (DESYREL) 50 MG Tab TAKE 1-2 TABS BY MOUTH EVERY BEDTIME. 90 Tab 2   • Tadalafil, PAH, (ADCIRCA) 20 MG Tab Take 40 mg by mouth every bedtime. Indications: Pulmonary Arterial Hypertension 180 Tab 3   • cyclobenzaprine (FLEXERIL) 10 MG Tab Take 10 mg by mouth 3 times a day as needed for Muscle Spasms.     • Linaclotide 290 MCG Cap Take 290 mg by mouth every day. 90 Cap 3   • CITRACAL MAXIMUM 315-250 MG-UNIT Tab TAKE 2 TABS BY MOUTH 2X/ DAY WITH FOOD BEFORE AND AFTER DINNER FOR LIFE-CRUSH 1ST 3 MONTH, SPACE  Tab 11   • ascorbic acid (ASCORBIC ACID) 500 MG Tab Take 500 mg by mouth every day.    "  • sennosides (SENOKOT) 8.6 MG Tab Take 17.2 mg by mouth 2 times a day.     • Probiotic Product (PROBIOTIC DAILY PO) Take 1 Cap by mouth every day.     • omeprazole (PRILOSEC) 40 MG delayed-release capsule Take 1 Cap by mouth every day. 90 Cap 4   • ondansetron (ZOFRAN ODT) 4 MG TABLET DISPERSIBLE Take 1 Tab by mouth every 8 hours as needed for Nausea/Vomiting. Nausea and vomiting 270 Tab 4   • tiotropium (SPIRIVA) 18 MCG Cap Inhale 1 Cap by mouth every day. 90 Cap 4   • docusate sodium (COLACE) 100 MG Cap Take 100 mg by mouth 2 times a day.     • mycophenolate (CELLCEPT) 250 MG Cap Take 500 mg by mouth 2 times a day.     • lactulose 20 GM/30ML Solution Take 30 mL by mouth 3 times a day as needed (constipation). 30 Each 1   • bisacodyl (DULCOLAX) 10 MG Suppos Insert 1 Suppository in rectum as needed (constipation). 30 Suppository 1   • acetaminophen (TYLENOL) 325 MG Tab Take 2 Tabs by mouth every 6 hours. 30 Tab 0   • non-formulary med Inhale 5 L by mouth Continuous. Oxygen     • morphine (MS IR) 15 MG tablet Take 1 Tab by mouth every four hours as needed for Severe Pain. 30 Tab 0   • NON SPECIFIED Take 1 Cap by mouth every evening. Bariatric Dietary Supplment      • Naloxegol Oxalate 25 MG Tab Take 25 mg by mouth every day. 30 Tab 2   • ferrous sulfate (FEOSOL) 220 (44 FE) MG/5ML Elixir Take 5 mL by mouth every day. 1 Bottle 10     No current facility-administered medications on file prior to visit.      Blood pressure 122/78, pulse 72, temperature 36.9 °C (98.4 °F), resp. rate 16, height 1.727 m (5' 8\"), weight 77.1 kg (170 lb), last menstrual period 01/03/2000, SpO2 97 %.  Family History   Problem Relation Age of Onset   • Other Father      Unknown (dead 10 years)   • Diabetes Father    • Heart Disease Father    • Hypertension Father    • Hyperlipidemia Father    • Stroke Father    • Non-contributory Mother    • Hyperlipidemia Mother    • Alcohol/Drug Mother    • Cancer Paternal Aunt    • Alcohol/Drug Maternal " Grandmother    • Alcohol/Drug Maternal Grandfather        Physical Exam:  No distress at rest on supplemental oxygen  HEENT: PERRLA, EOMI, no scleral icterus, no nasal or oral lesions  Neck: No thyromegaly, no adenopathy, no bruits  Mallampatti: Grade II  Lungs: Equal breath sounds, no wheezes or crackles  Heart: Regular rate and rhythm, no gallops or murmurs  Abdomen: Soft, benign, no organomegaly. Still with postoperative changes.  Extremities: No clubbing, cyanosis, or edema  Neurologic: Cranial nerve, motor, and sensory exam are normal    1. Hypoxemia    2. Pulmonary hypertension    3. Status post liver transplant Dr. Canada ([facility])    4. Sarcoidosis (CMS-HCC)    5. Hepatopulmonary syndrome (CMS-HCC)    6. Immunocompromised state (CMS-HCC)    7. Obstructive sleep apnea    8. Ostium secundum type atrial septal defect, S/P percutaneous closure    9. History of lung biopsy    10. Anxiety        We will make no change in her current regimen.  She continues on supplemental oxygen.  Letairis and tadalafil will be continued  She will continue mycophenolate, Prograf, and prednisone at current dosage  We will see her in follow-up in 3 months or sooner if there are issues  She will need to continue her medications for pulmonary hypertension. If the current glance are withdrawn that she may need to seek alternative funding or change to alternative medications.

## 2017-11-28 VITALS
HEART RATE: 66 BPM | SYSTOLIC BLOOD PRESSURE: 100 MMHG | RESPIRATION RATE: 16 BRPM | TEMPERATURE: 97.9 F | OXYGEN SATURATION: 97 % | WEIGHT: 173 LBS | DIASTOLIC BLOOD PRESSURE: 63 MMHG | BODY MASS INDEX: 26.3 KG/M2

## 2017-11-28 VITALS
HEART RATE: 64 BPM | RESPIRATION RATE: 18 BRPM | SYSTOLIC BLOOD PRESSURE: 111 MMHG | WEIGHT: 172.3 LBS | BODY MASS INDEX: 26.2 KG/M2 | TEMPERATURE: 97.1 F | OXYGEN SATURATION: 97 % | DIASTOLIC BLOOD PRESSURE: 74 MMHG

## 2017-11-28 DIAGNOSIS — K59.03 DRUG-INDUCED CONSTIPATION: ICD-10-CM

## 2017-11-28 DIAGNOSIS — Z79.891 CHRONIC USE OF OPIATE DRUGS THERAPEUTIC PURPOSES: ICD-10-CM

## 2017-11-28 SDOH — ECONOMIC STABILITY: HOUSING INSECURITY: UNSAFE APPLIANCES: 0

## 2017-11-28 SDOH — ECONOMIC STABILITY: HOUSING INSECURITY: UNSAFE COOKING RANGE AREA: 0

## 2017-11-28 ASSESSMENT — ENCOUNTER SYMPTOMS: NAUSEA: DENIES

## 2017-11-29 RX ORDER — METHYLNALTREXONE BROMIDE 12 MG/.6ML
12 INJECTION, SOLUTION SUBCUTANEOUS
Qty: 18 ML | Refills: 2 | Status: SHIPPED | OUTPATIENT
Start: 2017-11-29 | End: 2017-12-29 | Stop reason: SDUPTHER

## 2017-11-29 RX ORDER — LINACLOTIDE 290 UG/1
CAPSULE, GELATIN COATED ORAL
Qty: 90 CAP | Refills: 3 | Status: SHIPPED | OUTPATIENT
Start: 2017-11-29 | End: 2017-12-29 | Stop reason: SDUPTHER

## 2017-11-29 NOTE — TELEPHONE ENCOUNTER
Pt last seen regarding this issue 11/17. Will send 3 fills of Relistor and 12 months of fills to pharmacy.

## 2017-11-29 NOTE — TELEPHONE ENCOUNTER
Was the patient seen in the last year in this department? Yes     Does patient have an active prescription for medications requested? No     Received Request Via: Pharmacy      Pt met protocol?: Yes, labs and ov 11/17 appt with pcp in one month

## 2017-11-30 ENCOUNTER — HOME CARE VISIT (OUTPATIENT)
Dept: HOME HEALTH SERVICES | Facility: HOME HEALTHCARE | Age: 57
End: 2017-11-30
Payer: MEDICARE

## 2017-11-30 VITALS
TEMPERATURE: 98.1 F | HEART RATE: 64 BPM | RESPIRATION RATE: 18 BRPM | OXYGEN SATURATION: 97 % | SYSTOLIC BLOOD PRESSURE: 110 MMHG | DIASTOLIC BLOOD PRESSURE: 58 MMHG

## 2017-11-30 PROCEDURE — G0299 HHS/HOSPICE OF RN EA 15 MIN: HCPCS

## 2017-11-30 SDOH — ECONOMIC STABILITY: HOUSING INSECURITY: UNSAFE COOKING RANGE AREA: 0

## 2017-11-30 SDOH — ECONOMIC STABILITY: HOUSING INSECURITY: UNSAFE APPLIANCES: 0

## 2017-11-30 ASSESSMENT — ENCOUNTER SYMPTOMS
VOMITING: DENIES
NAUSEA: DENIES

## 2017-12-05 ENCOUNTER — HOME CARE VISIT (OUTPATIENT)
Dept: HOME HEALTH SERVICES | Facility: HOME HEALTHCARE | Age: 57
End: 2017-12-05
Payer: MEDICARE

## 2017-12-05 VITALS
RESPIRATION RATE: 18 BRPM | HEART RATE: 76 BPM | TEMPERATURE: 98.5 F | DIASTOLIC BLOOD PRESSURE: 70 MMHG | OXYGEN SATURATION: 98 % | SYSTOLIC BLOOD PRESSURE: 128 MMHG

## 2017-12-05 PROCEDURE — G0162 HHC RN E&M PLAN SVS, 15 MIN: HCPCS

## 2017-12-05 SDOH — ECONOMIC STABILITY: HOUSING INSECURITY: UNSAFE APPLIANCES: 0

## 2017-12-05 SDOH — ECONOMIC STABILITY: HOUSING INSECURITY: UNSAFE COOKING RANGE AREA: 0

## 2017-12-05 ASSESSMENT — ACTIVITIES OF DAILY LIVING (ADL)
OASIS_M1830: 00
HOME_HEALTH_OASIS: 01
HOME_HEALTH_OASIS: 00

## 2017-12-06 ENCOUNTER — HOSPITAL ENCOUNTER (OUTPATIENT)
Dept: LAB | Facility: MEDICAL CENTER | Age: 57
End: 2017-12-06
Attending: INTERNAL MEDICINE
Payer: MEDICARE

## 2017-12-06 DIAGNOSIS — K59.03 DRUG-INDUCED CONSTIPATION: ICD-10-CM

## 2017-12-06 LAB
25(OH)D3 SERPL-MCNC: 42 NG/ML (ref 30–100)
ALBUMIN SERPL BCP-MCNC: 4.1 G/DL (ref 3.2–4.9)
ALBUMIN/GLOB SERPL: 2.1 G/DL
ALP SERPL-CCNC: 28 U/L (ref 30–99)
ALT SERPL-CCNC: 10 U/L (ref 2–50)
ANION GAP SERPL CALC-SCNC: 4 MMOL/L (ref 0–11.9)
APPEARANCE UR: CLEAR
AST SERPL-CCNC: 17 U/L (ref 12–45)
BASOPHILS # BLD AUTO: 0.6 % (ref 0–1.8)
BASOPHILS # BLD: 0.02 K/UL (ref 0–0.12)
BILIRUB SERPL-MCNC: 0.5 MG/DL (ref 0.1–1.5)
BILIRUB UR QL STRIP.AUTO: NEGATIVE
BUN SERPL-MCNC: 20 MG/DL (ref 8–22)
CALCIUM SERPL-MCNC: 9.6 MG/DL (ref 8.5–10.5)
CALCIUM SERPL-MCNC: 9.9 MG/DL (ref 8.5–10.5)
CHLORIDE SERPL-SCNC: 103 MMOL/L (ref 96–112)
CO2 SERPL-SCNC: 33 MMOL/L (ref 20–33)
COLOR UR: NORMAL
CREAT SERPL-MCNC: 1.12 MG/DL (ref 0.5–1.4)
CREAT UR-MCNC: 134.2 MG/DL
EOSINOPHIL # BLD AUTO: 0.14 K/UL (ref 0–0.51)
EOSINOPHIL NFR BLD: 4.1 % (ref 0–6.9)
ERYTHROCYTE [DISTWIDTH] IN BLOOD BY AUTOMATED COUNT: 46.2 FL (ref 35.9–50)
FERRITIN SERPL-MCNC: 39.7 NG/ML (ref 10–291)
GFR SERPL CREATININE-BSD FRML MDRD: 50 ML/MIN/1.73 M 2
GGT SERPL-CCNC: 10 U/L (ref 7–34)
GLOBULIN SER CALC-MCNC: 2 G/DL (ref 1.9–3.5)
GLUCOSE SERPL-MCNC: 87 MG/DL (ref 65–99)
GLUCOSE UR STRIP.AUTO-MCNC: NEGATIVE MG/DL
HCT VFR BLD AUTO: 39.1 % (ref 37–47)
HGB BLD-MCNC: 12.1 G/DL (ref 12–16)
IMM GRANULOCYTES # BLD AUTO: 0.01 K/UL (ref 0–0.11)
IMM GRANULOCYTES NFR BLD AUTO: 0.3 % (ref 0–0.9)
IRON SATN MFR SERPL: 20 % (ref 15–55)
IRON SERPL-MCNC: 67 UG/DL (ref 40–170)
KETONES UR STRIP.AUTO-MCNC: NEGATIVE MG/DL
LEUKOCYTE ESTERASE UR QL STRIP.AUTO: NEGATIVE
LYMPHOCYTES # BLD AUTO: 1.22 K/UL (ref 1–4.8)
LYMPHOCYTES NFR BLD: 36 % (ref 22–41)
MAGNESIUM SERPL-MCNC: 2 MG/DL (ref 1.5–2.5)
MAGNESIUM SERPL-MCNC: 2 MG/DL (ref 1.5–2.5)
MCH RBC QN AUTO: 28.4 PG (ref 27–33)
MCHC RBC AUTO-ENTMCNC: 30.9 G/DL (ref 33.6–35)
MCV RBC AUTO: 91.8 FL (ref 81.4–97.8)
MICRO URNS: NORMAL
MONOCYTES # BLD AUTO: 0.2 K/UL (ref 0–0.85)
MONOCYTES NFR BLD AUTO: 5.9 % (ref 0–13.4)
NEUTROPHILS # BLD AUTO: 1.8 K/UL (ref 2–7.15)
NEUTROPHILS NFR BLD: 53.1 % (ref 44–72)
NITRITE UR QL STRIP.AUTO: NEGATIVE
NRBC # BLD AUTO: 0 K/UL
NRBC BLD AUTO-RTO: 0 /100 WBC
PH UR STRIP.AUTO: 6 [PH]
PHOSPHATE SERPL-MCNC: 4.5 MG/DL (ref 2.5–4.5)
PLATELET # BLD AUTO: 75 K/UL (ref 164–446)
PMV BLD AUTO: 10.2 FL (ref 9–12.9)
POTASSIUM SERPL-SCNC: 4 MMOL/L (ref 3.6–5.5)
PROT SERPL-MCNC: 6.1 G/DL (ref 6–8.2)
PROT UR QL STRIP: NEGATIVE MG/DL
PROT UR-MCNC: 6.2 MG/DL (ref 0–15)
PROT/CREAT UR: 46 MG/G (ref 10–107)
PTH-INTACT SERPL-MCNC: 34.8 PG/ML (ref 14–72)
RBC # BLD AUTO: 4.26 M/UL (ref 4.2–5.4)
RBC UR QL AUTO: NEGATIVE
SODIUM SERPL-SCNC: 140 MMOL/L (ref 135–145)
SP GR UR STRIP.AUTO: 1.02
TIBC SERPL-MCNC: 340 UG/DL (ref 250–450)
URATE SERPL-MCNC: 6 MG/DL (ref 1.9–8.2)
UROBILINOGEN UR STRIP.AUTO-MCNC: 0.2 MG/DL
WBC # BLD AUTO: 3.4 K/UL (ref 4.8–10.8)

## 2017-12-06 PROCEDURE — 80053 COMPREHEN METABOLIC PANEL: CPT

## 2017-12-06 PROCEDURE — 82977 ASSAY OF GGT: CPT

## 2017-12-06 PROCEDURE — 84156 ASSAY OF PROTEIN URINE: CPT

## 2017-12-06 PROCEDURE — 81003 URINALYSIS AUTO W/O SCOPE: CPT

## 2017-12-06 PROCEDURE — 80197 ASSAY OF TACROLIMUS: CPT

## 2017-12-06 PROCEDURE — 85025 COMPLETE CBC W/AUTO DIFF WBC: CPT

## 2017-12-06 PROCEDURE — 83970 ASSAY OF PARATHORMONE: CPT

## 2017-12-06 PROCEDURE — 83540 ASSAY OF IRON: CPT

## 2017-12-06 PROCEDURE — 84550 ASSAY OF BLOOD/URIC ACID: CPT

## 2017-12-06 PROCEDURE — 83735 ASSAY OF MAGNESIUM: CPT | Mod: 91

## 2017-12-06 PROCEDURE — 83735 ASSAY OF MAGNESIUM: CPT

## 2017-12-06 PROCEDURE — 82728 ASSAY OF FERRITIN: CPT

## 2017-12-06 PROCEDURE — 84100 ASSAY OF PHOSPHORUS: CPT

## 2017-12-06 PROCEDURE — 82570 ASSAY OF URINE CREATININE: CPT

## 2017-12-06 PROCEDURE — 36415 COLL VENOUS BLD VENIPUNCTURE: CPT

## 2017-12-06 PROCEDURE — 82306 VITAMIN D 25 HYDROXY: CPT

## 2017-12-06 PROCEDURE — 83550 IRON BINDING TEST: CPT

## 2017-12-06 RX ORDER — LACTULOSE 10 G/15ML
20 SOLUTION ORAL 2 TIMES DAILY
Qty: 1 BOTTLE | Refills: 2 | Status: SHIPPED | OUTPATIENT
Start: 2017-12-06 | End: 2017-12-29 | Stop reason: SDUPTHER

## 2017-12-06 RX ORDER — TRAZODONE HYDROCHLORIDE 50 MG/1
TABLET ORAL
Qty: 180 TAB | Refills: 0 | Status: SHIPPED | OUTPATIENT
Start: 2017-12-06 | End: 2017-12-29 | Stop reason: SDUPTHER

## 2017-12-06 NOTE — TELEPHONE ENCOUNTER
Was the patient seen in the last year in this department? Yes     Does patient have an active prescription for medications requested? No     Received Request Via: Pharmacy      Pt met protocol?: Yes, OV 11/17

## 2017-12-08 LAB — TACROLIMUS BLD-MCNC: 2.4 NG/ML

## 2017-12-26 ENCOUNTER — PATIENT OUTREACH (OUTPATIENT)
Dept: HEALTH INFORMATION MANAGEMENT | Facility: OTHER | Age: 57
End: 2017-12-26

## 2017-12-27 NOTE — PROGRESS NOTES
Outreach call to the patient for CC.  The patient acknowledges that she has access to care and needed services.  Provided the patient with [phone] for finding a new PCP as her PCP is leaving the practice in January.  Provided Roxana with [facility] contact information and encouraged to call with any questions or concerns.  Plan: will not continue to follow.

## 2017-12-29 ENCOUNTER — OFFICE VISIT (OUTPATIENT)
Dept: MEDICAL GROUP | Facility: PHYSICIAN GROUP | Age: 57
End: 2017-12-29
Payer: MEDICARE

## 2017-12-29 VITALS
SYSTOLIC BLOOD PRESSURE: 112 MMHG | OXYGEN SATURATION: 94 % | WEIGHT: 171 LBS | HEART RATE: 75 BPM | HEIGHT: 68 IN | DIASTOLIC BLOOD PRESSURE: 72 MMHG | BODY MASS INDEX: 25.91 KG/M2 | TEMPERATURE: 99.1 F

## 2017-12-29 DIAGNOSIS — K44.9 HIATAL HERNIA: ICD-10-CM

## 2017-12-29 DIAGNOSIS — G89.29 CHRONIC MIDLINE LOW BACK PAIN WITHOUT SCIATICA: ICD-10-CM

## 2017-12-29 DIAGNOSIS — M54.6 CHRONIC BILATERAL THORACIC BACK PAIN: ICD-10-CM

## 2017-12-29 DIAGNOSIS — G89.4 CHRONIC PAIN SYNDROME: ICD-10-CM

## 2017-12-29 DIAGNOSIS — J02.9 SORE THROAT: ICD-10-CM

## 2017-12-29 DIAGNOSIS — F39 MOOD DISORDER (HCC): ICD-10-CM

## 2017-12-29 DIAGNOSIS — G89.29 CHRONIC BILATERAL THORACIC BACK PAIN: ICD-10-CM

## 2017-12-29 DIAGNOSIS — R13.10 DYSPHAGIA, UNSPECIFIED TYPE: ICD-10-CM

## 2017-12-29 DIAGNOSIS — R05.8 NONPRODUCTIVE COUGH: ICD-10-CM

## 2017-12-29 DIAGNOSIS — K21.00 GASTROESOPHAGEAL REFLUX DISEASE WITH ESOPHAGITIS: ICD-10-CM

## 2017-12-29 DIAGNOSIS — I27.20 PULMONARY HYPERTENSION (HCC): ICD-10-CM

## 2017-12-29 DIAGNOSIS — Z79.899 CHRONIC PRESCRIPTION BENZODIAZEPINE USE: ICD-10-CM

## 2017-12-29 DIAGNOSIS — Z79.891 CHRONIC USE OF OPIATE DRUGS THERAPEUTIC PURPOSES: ICD-10-CM

## 2017-12-29 DIAGNOSIS — M54.50 CHRONIC MIDLINE LOW BACK PAIN WITHOUT SCIATICA: ICD-10-CM

## 2017-12-29 DIAGNOSIS — G89.29 CHRONIC GENERALIZED ABDOMINAL PAIN: ICD-10-CM

## 2017-12-29 DIAGNOSIS — E55.9 VITAMIN D DEFICIENCY: ICD-10-CM

## 2017-12-29 DIAGNOSIS — K59.03 DRUG-INDUCED CONSTIPATION: ICD-10-CM

## 2017-12-29 DIAGNOSIS — D86.9 SARCOIDOSIS: ICD-10-CM

## 2017-12-29 DIAGNOSIS — J69.0 ASPIRATION PNEUMONIA, UNSPECIFIED ASPIRATION PNEUMONIA TYPE, UNSPECIFIED LATERALITY, UNSPECIFIED PART OF LUNG (HCC): ICD-10-CM

## 2017-12-29 DIAGNOSIS — R10.84 CHRONIC GENERALIZED ABDOMINAL PAIN: ICD-10-CM

## 2017-12-29 PROCEDURE — 99214 OFFICE O/P EST MOD 30 MIN: CPT | Performed by: FAMILY MEDICINE

## 2017-12-29 RX ORDER — OXYCODONE HYDROCHLORIDE 10 MG/1
10 TABLET ORAL 3 TIMES DAILY PRN
Qty: 90 TAB | Refills: 0 | Status: ON HOLD | OUTPATIENT
Start: 2018-03-17 | End: 2018-01-20

## 2017-12-29 RX ORDER — ALPRAZOLAM 1 MG/1
1 TABLET ORAL 3 TIMES DAILY PRN
Qty: 90 TAB | Refills: 2 | Status: SHIPPED | OUTPATIENT
Start: 2017-12-29 | End: 2018-01-28

## 2017-12-29 RX ORDER — ALPRAZOLAM 1 MG/1
1 TABLET ORAL 3 TIMES DAILY PRN
Qty: 90 TAB | Refills: 0 | Status: SHIPPED | OUTPATIENT
Start: 2017-12-29 | End: 2017-12-29 | Stop reason: SDUPTHER

## 2017-12-29 RX ORDER — LEVOTHYROXINE SODIUM 137 UG/1
137 TABLET ORAL
Qty: 90 TAB | Refills: 3 | Status: ON HOLD | OUTPATIENT
Start: 2017-12-29 | End: 2018-11-26 | Stop reason: SDUPTHER

## 2017-12-29 RX ORDER — FLUTICASONE PROPIONATE 50 MCG
SPRAY, SUSPENSION (ML) NASAL
Qty: 48 G | Refills: 3 | Status: SHIPPED | OUTPATIENT
Start: 2017-12-29 | End: 2018-04-27

## 2017-12-29 RX ORDER — PREDNISONE 5 MG/1
5 TABLET ORAL
Refills: 5 | Status: ON HOLD | COMMUNITY
Start: 2017-11-29 | End: 2018-01-20

## 2017-12-29 RX ORDER — OXYCODONE HYDROCHLORIDE 10 MG/1
10 TABLET ORAL 3 TIMES DAILY PRN
Qty: 90 TAB | Refills: 0 | Status: SHIPPED | OUTPATIENT
Start: 2018-02-15 | End: 2018-03-17

## 2017-12-29 RX ORDER — OXYCODONE HYDROCHLORIDE 10 MG/1
10 TABLET ORAL 3 TIMES DAILY PRN
Qty: 90 TAB | Refills: 0 | Status: ON HOLD | OUTPATIENT
Start: 2018-01-16 | End: 2018-01-20

## 2017-12-29 RX ORDER — BLOOD-GLUCOSE METER
KIT MISCELLANEOUS
Qty: 100 STRIP | Refills: 3 | Status: ON HOLD | OUTPATIENT
Start: 2017-12-29 | End: 2018-01-20

## 2017-12-29 RX ORDER — SERTRALINE HYDROCHLORIDE 100 MG/1
100 TABLET, FILM COATED ORAL
Qty: 90 TAB | Refills: 4 | Status: SHIPPED | OUTPATIENT
Start: 2017-12-29 | End: 2019-01-29

## 2017-12-29 RX ORDER — TRAZODONE HYDROCHLORIDE 50 MG/1
TABLET ORAL
Qty: 180 TAB | Refills: 3 | Status: SHIPPED | OUTPATIENT
Start: 2017-12-29 | End: 2018-04-27

## 2017-12-29 RX ORDER — BISACODYL 10 MG
10 SUPPOSITORY, RECTAL RECTAL
Qty: 30 SUPPOSITORY | Refills: 3 | Status: SHIPPED | OUTPATIENT
Start: 2017-12-29 | End: 2018-05-18 | Stop reason: SDUPTHER

## 2017-12-29 RX ORDER — OMEPRAZOLE 40 MG/1
40 CAPSULE, DELAYED RELEASE ORAL DAILY
Qty: 90 CAP | Refills: 3 | Status: SHIPPED | OUTPATIENT
Start: 2017-12-29 | End: 2018-02-22 | Stop reason: SDUPTHER

## 2017-12-29 RX ORDER — LACTULOSE 10 G/15ML
20 SOLUTION ORAL 2 TIMES DAILY
Qty: 1 BOTTLE | Refills: 3 | Status: SHIPPED | OUTPATIENT
Start: 2017-12-29 | End: 2018-02-08 | Stop reason: SDUPTHER

## 2017-12-29 RX ORDER — FUROSEMIDE 40 MG/1
40 TABLET ORAL
Refills: 6 | Status: ON HOLD | COMMUNITY
Start: 2017-12-16 | End: 2018-01-20

## 2017-12-29 NOTE — PROGRESS NOTES
Subjective:     Chief Complaint   Patient presents with   • Liver Transplant     FV       Roxana Cuba is a 57 y.o. female here today for Multiple concerns:    Patient reports severe worsening anxiety. Patient states she is crying throughout the day. Patient is also been consistently picking on her nails resulting cuticle bleeding. She is stressed over loss of funding for her medication. She is also stressed about establishing with a new provider as she is very concerned her medications will be changed and/or stopped. Patient is currently taking Xanax 0.5 mg 3 times a day. Due to increased anxiety she has increased to 2 tablets 3 times a day. She states that with increase in dose she has better control of her anxiety. Patient therefore requests increase to 1 mg 3 times a day.    Patient has a history of chronic pain due to post surgical pain, chronic low back pain and chronic thoracic pain. Patient is currently taking oxycodone 10 mg 3 times a day. Patient reports that with out pain medication her pain as 10/10 located at Right side, and low back. Patient reports waking up in the morning with 4/10 pain. Patient states pain then quickly becomes 10/10. Pt reports neuropathy in heels which is  worse with shoes. Patient reports that her back pain is worse with walking, bending and lifting. Heat and rest improve her generalized pain. She has no improvement in pain with gabapentin and wishes to stop.   Opioid Risk Score: 1   Consequences of Chronic Opiate therapy:  (5 A's)  Analgesia: Improved with medication  Activity: Restricted ADLs , improved with use of medication  Adverse Events:   constipation  Aberrant Behaviors:  None  and patient reports consistent  Affect/Mood: appropriate affect  Signs of oversedation. No signs of withdrawal.    Patient was seen by [facility] pulmonologist Dr. Arango for sarcoidosis, chronic respiratory failure, and pulmonary hypertension. There is concern for recurrent aspiration pneumonia.  Patient reports difficulty swallowing both solids and liquids for 3-6 months. She denies painful swallowing.   Pulmonologist recommended CT of the chest without contrast, speech evaluation with fluoroscopy, swallow study, and referral to ENT.    She reports she continues to have abdominal pain. Patient reports she is concerned that hiatal hernia is causing worsening acid reflux, abdominal pain, and aspiration. She therefore requests referral to surgery for possible repair.       Patient reports sore dry throat with cough. Patient reports dry throat and cough have worsened due to increased anxiety.    Allergies   Allergen Reactions   • Rhubarb Anaphylaxis   • Organic Nitrates      Nitroglycerin products should be avoided with the use of PDE-5 inhibitors as the combination can result in severe hypotension.   • Vancomycin Hcl      Causes red man syndrome when infused to fast       Current medicines (including changes today)  Current Outpatient Prescriptions   Medication Sig Dispense Refill   • alprazolam (XANAX) 1 MG Tab Take 1 Tab by mouth 3 times a day as needed for Anxiety for up to 30 days. 90 Tab 2   • [START ON 2/15/2018] oxycodone immediate release (ROXICODONE) 10 MG immediate release tablet Take 1 Tab by mouth 3 times a day as needed for Severe Pain for up to 30 days. 90 Tab 0   • [START ON 3/17/2018] oxycodone immediate release (ROXICODONE) 10 MG immediate release tablet Take 1 Tab by mouth 3 times a day as needed for Moderate Pain for up to 30 days. 90 Tab 0   • [START ON 1/16/2018] oxycodone immediate release (ROXICODONE) 10 MG immediate release tablet Take 1 Tab by mouth 3 times a day as needed for Moderate Pain or Severe Pain for up to 30 days. 90 Tab 0   • furosemide (LASIX) 40 MG Tab Take 40 mg by mouth every day. TAKE 1 TAB BY MOUTH EVERY DAY.  6   • predniSONE (DELTASONE) 5 MG Tab Take 7 mg by mouth every day.  5   • calcium citrate 315 mg-vitamin D 200 mg (CITRACAL+D) 315-200 MG-UNIT per tablet Take 1  Tab by mouth every day. 90 Tab 3   • lactulose 10 GM/15ML Solution Take 30 mL by mouth 2 times a day. 1 Bottle 3   • FREESTYLE LITE strip USE TO TEST EVERY DAY AS NEEDED SSX HIGH OR  Strip 3   • methylnaltrexone (RELISTOR) 12 MG/0.6ML Solution Inject 0.6 mL as instructed 1 time daily as needed for up to 30 days. 18 mL 3   • Linaclotide (LINZESS) 290 MCG Cap Take 1 Cap by mouth every day. 90 Cap 3   • bisacodyl (DULCOLAX) 10 MG Suppos Insert 1 Suppository in rectum 1 time daily as needed (constipation). 30 Suppository 3   • trazodone (DESYREL) 50 MG Tab TAKE 1-2 TABS BY MOUTH EVERY BEDTIME. 180 Tab 3   • aspirin 81 MG tablet Take 1 Tab by mouth every day. 90 Tab 3   • sertraline (ZOLOFT) 100 MG Tab Take 1 Tab by mouth every bedtime. 90 Tab 4   • fluticasone (FLONASE) 50 MCG/ACT nasal spray SPRAY 1 SPRAY IN EACH NOSTRIL TWICE A DAY 48 g 3   • levothyroxine (SYNTHROID) 137 MCG Tab Take 1 Tab by mouth Every morning on an empty stomach. 90 Tab 3   • omeprazole (PRILOSEC) 40 MG delayed-release capsule Take 1 Cap by mouth every day. 90 Cap 3   • ACIDOPHILUS LACTOBACILLUS PO Take  by mouth.     • SENNOSIDES PO Take  by mouth.     • tacrolimus (PROGRAF) 1 MG Cap TAKE 1 CAPSULE BY MOUTH EVERY MORNING AND 1 CAP IN THE EVENING-TAKE WITH 0.5MG  11   • Ferrous Sulfate Powder by Does not apply route.     • non-formulary med Inhale 5 L by mouth Continuous. Oxygen     • morphine (MS IR) 15 MG tablet Take 1 Tab by mouth every four hours as needed for Severe Pain. 30 Tab 0   • NON SPECIFIED Take 1 Cap by mouth every evening. Bariatric Dietary Supplment      • tacrolimus (PROGRAF) 0.5 MG Cap Take 1.5 mg by mouth 2 Times a Day. Pt uses a 1 mg and 0.5 mg cap      • Naloxegol Oxalate 25 MG Tab Take 25 mg by mouth every day. 30 Tab 2   • predniSONE (DELTASONE) 1 MG Tab Take 7 Tabs by mouth every day. 7 Tab 0   • Wheat Dextrin (BENEFIBER) Powder Take 3.8 g by mouth every day.     • polyethylene glycol 3350 (MIRALAX) Powder Take 1 g  by mouth every day.     • ambrisentan (LETAIRIS) 10 MG tablet Take 1 Tab by mouth every day. 30 Tab 11   • pravastatin (PRAVACHOL) 20 MG Tab TAKE 1 TAB BY MOUTH EVERY DAY. 30 Tab 11   • Tadalafil, PAH, (ADCIRCA) 20 MG Tab Take 40 mg by mouth every bedtime. Indications: Pulmonary Arterial Hypertension 180 Tab 3   • cyclobenzaprine (FLEXERIL) 10 MG Tab Take 10 mg by mouth 3 times a day as needed for Muscle Spasms.     • ferrous sulfate (FEOSOL) 220 (44 FE) MG/5ML Elixir Take 5 mL by mouth every day. 1 Bottle 10   • CITRACAL MAXIMUM 315-250 MG-UNIT Tab TAKE 2 TABS BY MOUTH 2X/ DAY WITH FOOD BEFORE AND AFTER DINNER FOR LIFE-CRUSH 1ST 3 MONTH, SPACE  Tab 11   • ascorbic acid (ASCORBIC ACID) 500 MG Tab Take 500 mg by mouth every day.     • sennosides (SENOKOT) 8.6 MG Tab Take 17.2 mg by mouth 2 times a day.     • Probiotic Product (PROBIOTIC DAILY PO) Take 1 Cap by mouth every day.     • ondansetron (ZOFRAN ODT) 4 MG TABLET DISPERSIBLE Take 1 Tab by mouth every 8 hours as needed for Nausea/Vomiting. Nausea and vomiting 270 Tab 4   • tiotropium (SPIRIVA) 18 MCG Cap Inhale 1 Cap by mouth every day. 90 Cap 4   • mycophenolate (CELLCEPT) 250 MG Cap Take 500 mg by mouth 2 times a day.     • Calcium Citrate-Vitamin D (CALCIUM CITRATE + D) 315-250 MG-UNIT Tab Take  by mouth.       No current facility-administered medications for this visit.      Social History   Substance Use Topics   • Smoking status: Passive Smoke Exposure - Never Smoker   • Smokeless tobacco: Never Used      Comment: avoid all tobacco products   • Alcohol use No      Comment: 2009 quit drinking     Family Status   Relation Status   • Father    • Mother    • Sister Alive   • Paternal Aunt    • Maternal Grandmother    • Maternal Grandfather      Family History   Problem Relation Age of Onset   • Other Father      Unknown (dead 10 years)   • Diabetes Father    • Heart Disease Father    • Hypertension Father    • Hyperlipidemia Father   "  • Stroke Father    • Non-contributory Mother    • Hyperlipidemia Mother    • Alcohol/Drug Mother    • Cancer Paternal Aunt    • Alcohol/Drug Maternal Grandmother    • Alcohol/Drug Maternal Grandfather      She    has a past medical history of Breath shortness; Bronchitis ( ); Cardiomegaly; Chronic fatigue and malaise; Cirrhosis (CMS-HCC) (December 2011); CKD (chronic kidney disease) stage 3, GFR 30-59 ml/min; Diabetes (CMS-HCC); Fracture of left foot; GERD (gastroesophageal reflux disease); H/O Clostridium difficile infection (02-17-17); Hypothyroid; Low back pain (02-17-17); Mild aortic regurgitation and aortic valve sclerosis ( ); On home oxygen therapy; Platelet disorder (CMS-HCC); Pneumonia; Psychiatric disorder; Pulmonary hypertension; S/P cholecystectomy; Small bowel obstruction; Splenomegaly; and VRE (vancomycin-resistant Enterococci).        ROS   GEN: no weight loss, fevers, or chills  HEENT: no blurry vision or change in vision, no ear pain, + difficulty swallowing,+ throat pain  Resp: +shortness of breath, + cough  CV: no racing heart, no irregular beats, no chest pain  Abd: no nausea, no vomiting, no diarrhea, +constipation, no blood in stool, no dark stools, no incontinence  : no dysuria, no hematuria, no urinary incontinence, no increased frequency  MSK: as per hpi  Neuro:  no dizziness, no LOC, no weakness     Objective:     Blood pressure 112/72, pulse 75, temperature 37.3 °C (99.1 °F), height 1.727 m (5' 8\"), weight 77.6 kg (171 lb), last menstrual period 01/03/2000, SpO2 94 %. Body mass index is 26 kg/m².   Physical Exam:  Constitutional: Alert, no distress.  Skin: Warm, dry, good turgor, no rashes in visible areas.  Eye: Equal, round and reactive, conjunctiva clear, lids normal.  ENMT: Lips without lesions, oropharynx erythematous, no exudate, moist oral mucosa, bilateral tympanic membranes: No bulging, no retraction, no fluid, nonerythematous, positive light reflex, external auditory canals: " Clear, scant cerumen, nonerythematous  Neck: Trachea midline, no masses, no thyromegaly. No cervical or supraclavicular lymphadenopathy. Full ROM, no JVD  Respiratory: NC in place, Unlabored respiratory effort, good air movement, lungs clear to auscultation, no wheezes, no ronchi.  Cardiovascular:RRR, +S1, S2,  no peripheral edema, pedal and radial pulses equal and intact bilat  Abdomen: Soft, +BS in all 4 quadrants,   MSK:5/5 muscle strength in upper extremities as well as lower extremity bilaterally, tenderness to palpation L1-S1  Psych: Alert and oriented, anxious, tearful  Neuro: no gross motor or sensory deficits      Assessment and Plan:   The following treatment plan was discussed    1. Mood disorder (CMS-HCC)  2. Chronic prescription benzodiazepine use  Currently anxiety is worsening due to stress as noted above. Recommend increase of Xanax for immediate control. Patient declines referral to psychiatry. Patient declines referral to counseling.  - alprazolam (XANAX) 1 MG Tab; Take 1 Tab by mouth 3 times a day as needed for Anxiety for up to 30 days.  Dispense: 90 Tab; Refill: 2  - trazodone (DESYREL) 50 MG Tab; TAKE 1-2 TABS BY MOUTH EVERY BEDTIME.  Dispense: 180 Tab; Refill: 3  - sertraline (ZOLOFT) 100 MG Tab; Take 1 Tab by mouth every bedtime.  Dispense: 90 Tab; Refill: 4      3. Chronic use of opiate drugs therapeutic purposes  4. Chronic bilateral thoracic back pain  5. Chronic pain syndrome  6. Chronic midline low back pain without sciatica  -I stressed the importance of  non-narcotic treatments to control pain including  NSAIDs, PT, surgery, gabapentin, Cymbalta, topical analgesics,home exercise plan, and pain management for injections.    -  I reviewed medications. The medications have  been  helpful for controlling the pain, maintaining mood, and allowing the patient to function, including ADLs.   -Patient is aware of significant risk for respiratory failure and death resulting from combined use of  Xanax with oxycodone. Patient wishes to continue treatment understanding the risks of death.    - No aberrant behavior noted.  -  I reviewed diagnostic studies. I reviewed lab studies.  I reviewed medical issues. I reviewed social issues.  - I have obtained and reviewed patient utilization report from [facility]. The pt has a meaningful improvement in function and pain without significant risk of harm. Based on my assessment through pt's  Report of pain, physical exam, diagnostic imaging, and review of records including , the controlled medicine prescriptions written today are medically necessary.  -The patient was advised to avoid alcohol use with prescribed medication. I counseled the patient regarding risks, side effects, and interactions of medications. Pt is advised to take no drugs and  take only medications reported to this office and prescribed.  I counseled the patient on the controlled substance prescribing program. I reviewed further symptomatic medications. I advised the pt of risk of use of NSAIDs for PUD and renal concerns.   -No acetaminophen due to liver transplant     Date of Last therapy agreement/information sheet: 7/2017  Opiate Risk score: 2  MED:45  Date of last UDS: April 2017   verified: today  Discrepancies: none  - oxycodone immediate release (ROXICODONE) 10 MG immediate release tablet; Take 1 Tab by mouth 3 times a day as needed for Severe Pain for up to 30 days.  Dispense: 90 Tab; Refill: 0  - oxycodone immediate release (ROXICODONE) 10 MG immediate release tablet; Take 1 Tab by mouth 3 times a day as needed for Moderate Pain for up to 30 days.  Dispense: 90 Tab; Refill: 0  - oxycodone immediate release (ROXICODONE) 10 MG immediate release tablet; Take 1 Tab by mouth 3 times a day as needed for Moderate Pain or Severe Pain for up to 30 days.  Dispense: 90 Tab; Refill: 0  - methylnaltrexone (RELISTOR) 12 MG/0.6ML Solution; Inject 0.6 mL as instructed 1 time daily as  needed for up to 30 days.  Dispense: 18 mL; Refill: 3    7. Sore throat  8. Nonproductive cough  9. Hiatal hernia  10. Gastroesophageal reflux disease with esophagitis  11. Chronic generalized abdominal pain  Sore throat and cough concerning for uncontrolled GERD. Recommend follow-up with surgery for possible repair of hiatal hernia. Patient also reports concerns for aspiration pneumonia from pulmonology.  - REFERRAL TO GENERAL SURGERY  - omeprazole (PRILOSEC) 40 MG delayed-release capsule; Take 1 Cap by mouth every day.  Dispense: 90 Cap; Refill: 3    12. Dysphagia, unspecified type  New concern: Due to concern for aspiration pulmonology has recommended following tests:  - [id number] EVAL VCR; Future  - REFERRAL TO ENT  - CLINICAL SWALLOW EVALUATION    13. Aspiration pneumonia, unspecified aspiration pneumonia type, unspecified laterality, unspecified part of lung (CMS-[facility])  Continue to follow with pulmonology locally as well as in [place]. Below order tests recommended by pulmonology.  - REFERRAL TO PULMONOLOGY  - CT-CHEST (THORAX) W/O; Future  - [id number] EVAL VCR; Future  - CLINICAL SWALLOW EVALUATION    14. Sarcoidosis (CMS-[facility])  Chronic: Continue to follow with pulmonology  - REFERRAL TO PULMONOLOGY  - CT-CHEST (THORAX) W/O; Future    15. Pulmonary hypertension  Chronic: Continue to follow with cardiology and pulmonology  - REFERRAL TO PULMONOLOGY  - CT-CHEST (THORAX) W/O; Future    16. Drug-induced constipation  Chronic: Patient reports improvement only with use of Relistor. No stork is therefore a necessary medication that patient needs to continue.  - lactulose 10 GM/15ML Solution; Take 30 mL by mouth 2 times a day.  Dispense: 1 Bottle; Refill: 3  - methylnaltrexone (RELISTOR) 12 MG/0.6ML Solution; Inject 0.6 mL as instructed 1 time daily as needed for up to 30 days.  Dispense: 18 mL; Refill: 3  - Linaclotide (LINZESS) 290 MCG Cap; Take 1 Cap by mouth every day.  Dispense: 90 Cap;  Refill: 3  - bisacodyl (DULCOLAX) 10 MG Suppos; Insert 1 Suppository in rectum 1 time daily as needed (constipation).  Dispense: 30 Suppository; Refill: 3    17. Vitamin D deficiency  - calcium citrate 315 mg-vitamin D 200 mg (CITRACAL+D) 315-200 MG-UNIT per tablet; Take 1 Tab by mouth every day.  Dispense: 90 Tab; Refill: 3    18.Insulin-dependent diabetes mellitus  Recommend continuing testing. No changes at this appointment as other more pressing issues were focused on    This was a 40 minute face-to-face visit spent discussing acute and chronic conditions  Followup: Return for chronic conditions with Dr. Phillip.    Please note that this dictation was created using voice recognition software. I have made every reasonable attempt to correct obvious errors, but I expect that there are errors of grammar and possibly content that I did not discover before finalizing the note.

## 2018-01-02 ENCOUNTER — PATIENT MESSAGE (OUTPATIENT)
Dept: CARDIOLOGY | Facility: MEDICAL CENTER | Age: 58
End: 2018-01-02

## 2018-01-02 DIAGNOSIS — Z79.891 CHRONIC USE OF OPIATE DRUGS THERAPEUTIC PURPOSES: ICD-10-CM

## 2018-01-02 DIAGNOSIS — K59.03 DRUG-INDUCED CONSTIPATION: ICD-10-CM

## 2018-01-05 ENCOUNTER — HOSPITAL ENCOUNTER (OUTPATIENT)
Dept: LAB | Facility: MEDICAL CENTER | Age: 58
End: 2018-01-05
Attending: INTERNAL MEDICINE
Payer: MEDICARE

## 2018-01-05 ENCOUNTER — TELEPHONE (OUTPATIENT)
Dept: CARDIOLOGY | Facility: MEDICAL CENTER | Age: 58
End: 2018-01-05

## 2018-01-05 LAB
ALBUMIN SERPL BCP-MCNC: 4.1 G/DL (ref 3.2–4.9)
ALBUMIN/GLOB SERPL: 1.8 G/DL
ALP SERPL-CCNC: 27 U/L (ref 30–99)
ALT SERPL-CCNC: 14 U/L (ref 2–50)
ANION GAP SERPL CALC-SCNC: 6 MMOL/L (ref 0–11.9)
AST SERPL-CCNC: 18 U/L (ref 12–45)
BASOPHILS # BLD AUTO: 0.4 % (ref 0–1.8)
BASOPHILS # BLD: 0.02 K/UL (ref 0–0.12)
BILIRUB SERPL-MCNC: 0.3 MG/DL (ref 0.1–1.5)
BNP SERPL-MCNC: 109 PG/ML (ref 0–100)
BUN SERPL-MCNC: 26 MG/DL (ref 8–22)
CALCIUM SERPL-MCNC: 9.1 MG/DL (ref 8.5–10.5)
CHLORIDE SERPL-SCNC: 103 MMOL/L (ref 96–112)
CO2 SERPL-SCNC: 34 MMOL/L (ref 20–33)
CREAT SERPL-MCNC: 1.13 MG/DL (ref 0.5–1.4)
EOSINOPHIL # BLD AUTO: 0.15 K/UL (ref 0–0.51)
EOSINOPHIL NFR BLD: 2.9 % (ref 0–6.9)
ERYTHROCYTE [DISTWIDTH] IN BLOOD BY AUTOMATED COUNT: 45.1 FL (ref 35.9–50)
GFR SERPL CREATININE-BSD FRML MDRD: 49 ML/MIN/1.73 M 2
GGT SERPL-CCNC: 9 U/L (ref 7–34)
GLOBULIN SER CALC-MCNC: 2.3 G/DL (ref 1.9–3.5)
GLUCOSE SERPL-MCNC: 82 MG/DL (ref 65–99)
HCT VFR BLD AUTO: 37.4 % (ref 37–47)
HGB BLD-MCNC: 11.9 G/DL (ref 12–16)
IMM GRANULOCYTES # BLD AUTO: 0.02 K/UL (ref 0–0.11)
IMM GRANULOCYTES NFR BLD AUTO: 0.4 % (ref 0–0.9)
LYMPHOCYTES # BLD AUTO: 0.83 K/UL (ref 1–4.8)
LYMPHOCYTES NFR BLD: 15.8 % (ref 22–41)
MAGNESIUM SERPL-MCNC: 2.1 MG/DL (ref 1.5–2.5)
MCH RBC QN AUTO: 28.5 PG (ref 27–33)
MCHC RBC AUTO-ENTMCNC: 31.8 G/DL (ref 33.6–35)
MCV RBC AUTO: 89.7 FL (ref 81.4–97.8)
MONOCYTES # BLD AUTO: 0.4 K/UL (ref 0–0.85)
MONOCYTES NFR BLD AUTO: 7.6 % (ref 0–13.4)
NEUTROPHILS # BLD AUTO: 3.83 K/UL (ref 2–7.15)
NEUTROPHILS NFR BLD: 72.9 % (ref 44–72)
NRBC # BLD AUTO: 0 K/UL
NRBC BLD-RTO: 0 /100 WBC
PLATELET # BLD AUTO: 67 K/UL (ref 164–446)
PMV BLD AUTO: 10.4 FL (ref 9–12.9)
POTASSIUM SERPL-SCNC: 3.8 MMOL/L (ref 3.6–5.5)
PROT SERPL-MCNC: 6.4 G/DL (ref 6–8.2)
RBC # BLD AUTO: 4.17 M/UL (ref 4.2–5.4)
SODIUM SERPL-SCNC: 143 MMOL/L (ref 135–145)
WBC # BLD AUTO: 5.3 K/UL (ref 4.8–10.8)

## 2018-01-05 PROCEDURE — 83735 ASSAY OF MAGNESIUM: CPT

## 2018-01-05 PROCEDURE — 80197 ASSAY OF TACROLIMUS: CPT

## 2018-01-05 PROCEDURE — 36415 COLL VENOUS BLD VENIPUNCTURE: CPT

## 2018-01-05 PROCEDURE — 85025 COMPLETE CBC W/AUTO DIFF WBC: CPT

## 2018-01-05 PROCEDURE — 82977 ASSAY OF GGT: CPT

## 2018-01-05 PROCEDURE — 83880 ASSAY OF NATRIURETIC PEPTIDE: CPT

## 2018-01-05 PROCEDURE — 80053 COMPREHEN METABOLIC PANEL: CPT

## 2018-01-05 NOTE — TELEPHONE ENCOUNTER
"Patient Roxana Cuba, MR# [id number], : 60 wants a call back at [phone]. She’s really upset that she hasn’t been able to speak to (Dr Phan’s nurse) about her caleb for her Latairis. She said that no one will return her calls and she needs to get this taken care of asap or she will lose her caleb.    Pts call returned. Discussed recent My Chart messages with patient. Pt states she sent 3 messages, I have only received 2, when attempting to clarify patient becomes quite upset, pt raises her voice and becomes tearful.     Pt apparently received written documentation from drug company that she is at risk of losing financial assistance if PAF paperwork isn't completed by 2018. Pt reassured that per our previous discussion on MyChart that it would be completed in a timely fashion and she would be notified.    Pt becomes tearful, pt is obviously upset by news. I attempt to explain that I understand all the above, but false accusations that staff is ignoring her communications only further delays us from completing what she needs us to get done by having to stop and urgently respond to such claims.    Pt states she is already upset by paperwork being lost by \"another cardiologists staff there\". When I begin to ask more details to attempt to help correct the matter, she states \"I DONT WANT TO TALK ABOUT THAT WITH YOU, JUST GET DONE WHAT I NEED\".    Pt urged if her timeline has changed from  than we need to be  Made aware so that we can meet that deadline. She states it hasn't but she is scared by that.    Her  takes the call as he states she is \"not doing so good\". The connection is poor but she ends call by stating he will bring a copy of document she received that has upset her so much to our office at 12:45.  "

## 2018-01-06 LAB — TACROLIMUS BLD-MCNC: 2.3 NG/ML

## 2018-01-06 NOTE — TELEPHONE ENCOUNTER
"Met with patients spouse in office lobby approx 1315.    He has brought documents from [facility] notifying patient of $9,000 award. There is a disclaimer that if MD documentation not received in timely fashion it may be jeapordized.    Sat and explained entire letter and all documentation with Mk. He states understanding. He apologizes and states she hasn't been \"ok\" and is stressed. I states understaing and reassure him that we are here to help. I make copies of letters and give him back originals.    We discuss if his matters were solved and he states yes, there is just no help for me and my Eliquis. Provided him with [phone]. He will call.     Asked him to please remind patient she is due to be seen the first week of February and I will notify her as soon as paperwork is complete. Mr. Cuba appreciative of our assistance today.  "

## 2018-01-09 DIAGNOSIS — I27.20 PULMONARY HYPERTENSION (HCC): ICD-10-CM

## 2018-01-10 RX ORDER — FUROSEMIDE 40 MG/1
TABLET ORAL
Qty: 30 TAB | Refills: 6 | Status: SHIPPED | OUTPATIENT
Start: 2018-01-10 | End: 2018-09-28

## 2018-01-12 ENCOUNTER — PATIENT MESSAGE (OUTPATIENT)
Dept: MEDICAL GROUP | Facility: PHYSICIAN GROUP | Age: 58
End: 2018-01-12

## 2018-01-12 DIAGNOSIS — Z79.891 CHRONIC USE OF OPIATE DRUGS THERAPEUTIC PURPOSES: ICD-10-CM

## 2018-01-12 DIAGNOSIS — F39 MOOD DISORDER (HCC): ICD-10-CM

## 2018-01-12 DIAGNOSIS — R10.84 CHRONIC GENERALIZED ABDOMINAL PAIN: ICD-10-CM

## 2018-01-12 DIAGNOSIS — K59.03 DRUG-INDUCED CONSTIPATION: ICD-10-CM

## 2018-01-12 DIAGNOSIS — M54.6 CHRONIC BILATERAL THORACIC BACK PAIN: ICD-10-CM

## 2018-01-12 DIAGNOSIS — F33.41 RECURRENT MAJOR DEPRESSIVE DISORDER, IN PARTIAL REMISSION (HCC): ICD-10-CM

## 2018-01-12 DIAGNOSIS — G89.4 CHRONIC PAIN SYNDROME: ICD-10-CM

## 2018-01-12 DIAGNOSIS — Z79.899 CHRONIC PRESCRIPTION BENZODIAZEPINE USE: ICD-10-CM

## 2018-01-12 DIAGNOSIS — G89.29 CHRONIC BILATERAL THORACIC BACK PAIN: ICD-10-CM

## 2018-01-12 DIAGNOSIS — G89.29 CHRONIC GENERALIZED ABDOMINAL PAIN: ICD-10-CM

## 2018-01-19 ENCOUNTER — HOSPITAL ENCOUNTER (OUTPATIENT)
Dept: RADIOLOGY | Facility: MEDICAL CENTER | Age: 58
DRG: 871 | End: 2018-01-19
Attending: FAMILY MEDICINE
Payer: MEDICARE

## 2018-01-19 DIAGNOSIS — J69.0 ASPIRATION PNEUMONIA, UNSPECIFIED ASPIRATION PNEUMONIA TYPE, UNSPECIFIED LATERALITY, UNSPECIFIED PART OF LUNG (HCC): ICD-10-CM

## 2018-01-19 DIAGNOSIS — D86.9 SARCOIDOSIS: ICD-10-CM

## 2018-01-19 DIAGNOSIS — I27.20 PULMONARY HYPERTENSION (HCC): ICD-10-CM

## 2018-01-19 PROCEDURE — 71250 CT THORAX DX C-: CPT

## 2018-01-20 ENCOUNTER — APPOINTMENT (OUTPATIENT)
Dept: RADIOLOGY | Facility: MEDICAL CENTER | Age: 58
DRG: 871 | End: 2018-01-20
Attending: INTERNAL MEDICINE
Payer: MEDICARE

## 2018-01-20 ENCOUNTER — APPOINTMENT (OUTPATIENT)
Dept: RADIOLOGY | Facility: MEDICAL CENTER | Age: 58
DRG: 871 | End: 2018-01-20
Attending: EMERGENCY MEDICINE
Payer: MEDICARE

## 2018-01-20 ENCOUNTER — HOSPITAL ENCOUNTER (INPATIENT)
Facility: MEDICAL CENTER | Age: 58
LOS: 6 days | DRG: 871 | End: 2018-01-26
Attending: EMERGENCY MEDICINE | Admitting: HOSPITALIST
Payer: MEDICARE

## 2018-01-20 ENCOUNTER — RESOLUTE PROFESSIONAL BILLING HOSPITAL PROF FEE (OUTPATIENT)
Dept: HOSPITALIST | Facility: MEDICAL CENTER | Age: 58
End: 2018-01-20
Payer: MEDICARE

## 2018-01-20 DIAGNOSIS — Z94.4 STATUS POST LIVER TRANSPLANT (HCC): ICD-10-CM

## 2018-01-20 DIAGNOSIS — D69.6 THROMBOCYTOPENIA (HCC): ICD-10-CM

## 2018-01-20 DIAGNOSIS — D84.9 IMMUNOCOMPROMISED STATE (HCC): ICD-10-CM

## 2018-01-20 DIAGNOSIS — K76.81 HEPATOPULMONARY SYNDROME (HCC): ICD-10-CM

## 2018-01-20 PROBLEM — J18.9 CAP (COMMUNITY ACQUIRED PNEUMONIA): Status: ACTIVE | Noted: 2018-01-20

## 2018-01-20 PROBLEM — A41.9 SEPSIS (HCC): Status: ACTIVE | Noted: 2018-01-20

## 2018-01-20 PROBLEM — N17.9 AKI (ACUTE KIDNEY INJURY) (HCC): Status: ACTIVE | Noted: 2018-01-20

## 2018-01-20 LAB
ALBUMIN SERPL BCP-MCNC: 4.2 G/DL (ref 3.2–4.9)
ALBUMIN/GLOB SERPL: 1.6 G/DL
ALP SERPL-CCNC: 31 U/L (ref 30–99)
ALT SERPL-CCNC: 18 U/L (ref 2–50)
ANION GAP SERPL CALC-SCNC: 9 MMOL/L (ref 0–11.9)
ANISOCYTOSIS BLD QL SMEAR: ABNORMAL
AST SERPL-CCNC: 46 U/L (ref 12–45)
BASOPHILS # BLD AUTO: 1.7 % (ref 0–1.8)
BASOPHILS # BLD: 0.05 K/UL (ref 0–0.12)
BILIRUB SERPL-MCNC: 0.4 MG/DL (ref 0.1–1.5)
BLOOD CULTURE HOLD CXBCH: NORMAL
BLOOD CULTURE HOLD CXBCH: NORMAL
BNP SERPL-MCNC: 31 PG/ML (ref 0–100)
BUN SERPL-MCNC: 25 MG/DL (ref 8–22)
CALCIUM SERPL-MCNC: 9.3 MG/DL (ref 8.5–10.5)
CHLORIDE SERPL-SCNC: 102 MMOL/L (ref 96–112)
CO2 SERPL-SCNC: 28 MMOL/L (ref 20–33)
CREAT SERPL-MCNC: 1.24 MG/DL (ref 0.5–1.4)
EKG IMPRESSION: NORMAL
EOSINOPHIL # BLD AUTO: 0.09 K/UL (ref 0–0.51)
EOSINOPHIL NFR BLD: 3.5 % (ref 0–6.9)
ERYTHROCYTE [DISTWIDTH] IN BLOOD BY AUTOMATED COUNT: 46.2 FL (ref 35.9–50)
FLUAV RNA SPEC QL NAA+PROBE: NEGATIVE
FLUBV RNA SPEC QL NAA+PROBE: NEGATIVE
GLOBULIN SER CALC-MCNC: 2.6 G/DL (ref 1.9–3.5)
GLUCOSE BLD-MCNC: 132 MG/DL (ref 65–99)
GLUCOSE BLD-MCNC: 145 MG/DL (ref 65–99)
GLUCOSE BLD-MCNC: 145 MG/DL (ref 65–99)
GLUCOSE SERPL-MCNC: 118 MG/DL (ref 65–99)
HCT VFR BLD AUTO: 35.8 % (ref 37–47)
HGB BLD-MCNC: 11.7 G/DL (ref 12–16)
IMM GRANULOCYTES # BLD AUTO: 0.01 K/UL (ref 0–0.11)
IMM GRANULOCYTES NFR BLD AUTO: 0.4 % (ref 0–0.9)
LACTATE BLD-SCNC: 2.3 MMOL/L (ref 0.5–2)
LIPASE SERPL-CCNC: 29 U/L (ref 11–82)
LYMPHOCYTES # BLD AUTO: 0.89 K/UL (ref 1–4.8)
LYMPHOCYTES NFR BLD: 33.1 % (ref 22–41)
MANUAL DIFF BLD: NORMAL
MCH RBC QN AUTO: 29.3 PG (ref 27–33)
MCHC RBC AUTO-ENTMCNC: 32.7 G/DL (ref 33.6–35)
MCV RBC AUTO: 89.5 FL (ref 81.4–97.8)
MICROCYTES BLD QL SMEAR: ABNORMAL
MONOCYTES # BLD AUTO: 0.07 K/UL (ref 0–0.85)
MONOCYTES NFR BLD AUTO: 2.6 % (ref 0–13.4)
MORPHOLOGY BLD-IMP: NORMAL
NEUTROPHILS # BLD AUTO: 1.6 K/UL (ref 2–7.15)
NEUTROPHILS NFR BLD: 56.5 % (ref 44–72)
NEUTS BAND NFR BLD MANUAL: 2.6 % (ref 0–10)
NRBC # BLD AUTO: 0 K/UL
NRBC BLD-RTO: 0 /100 WBC
PLATELET # BLD AUTO: 56 K/UL (ref 164–446)
PLATELET BLD QL SMEAR: NORMAL
PLATELETS.RETICULATED NFR BLD AUTO: 1.7 K/UL (ref 0.6–13.1)
PMV BLD AUTO: 9.4 FL (ref 9–12.9)
POIKILOCYTOSIS BLD QL SMEAR: NORMAL
POTASSIUM SERPL-SCNC: 4.1 MMOL/L (ref 3.6–5.5)
PROCALCITONIN SERPL-MCNC: 0.07 NG/ML
PROT SERPL-MCNC: 6.8 G/DL (ref 6–8.2)
RBC # BLD AUTO: 4 M/UL (ref 4.2–5.4)
RBC BLD AUTO: PRESENT
SCCMEC + MECA PNL NOSE NAA+PROBE: NEGATIVE
SCCMEC + MECA PNL NOSE NAA+PROBE: NEGATIVE
SODIUM SERPL-SCNC: 139 MMOL/L (ref 135–145)
SPHEROCYTES BLD QL SMEAR: NORMAL
TROPONIN I SERPL-MCNC: <0.01 NG/ML (ref 0–0.04)
WBC # BLD AUTO: 2.7 K/UL (ref 4.8–10.8)

## 2018-01-20 PROCEDURE — 700102 HCHG RX REV CODE 250 W/ 637 OVERRIDE(OP): Performed by: HOSPITALIST

## 2018-01-20 PROCEDURE — 700102 HCHG RX REV CODE 250 W/ 637 OVERRIDE(OP): Performed by: INTERNAL MEDICINE

## 2018-01-20 PROCEDURE — 83605 ASSAY OF LACTIC ACID: CPT

## 2018-01-20 PROCEDURE — 85007 BL SMEAR W/DIFF WBC COUNT: CPT

## 2018-01-20 PROCEDURE — 82962 GLUCOSE BLOOD TEST: CPT

## 2018-01-20 PROCEDURE — 700105 HCHG RX REV CODE 258: Performed by: INTERNAL MEDICINE

## 2018-01-20 PROCEDURE — 700105 HCHG RX REV CODE 258: Performed by: HOSPITALIST

## 2018-01-20 PROCEDURE — 74175 CTA ABDOMEN W/CONTRAST: CPT

## 2018-01-20 PROCEDURE — 770022 HCHG ROOM/CARE - ICU (200)

## 2018-01-20 PROCEDURE — 87502 INFLUENZA DNA AMP PROBE: CPT

## 2018-01-20 PROCEDURE — C1751 CATH, INF, PER/CENT/MIDLINE: HCPCS

## 2018-01-20 PROCEDURE — 84145 PROCALCITONIN (PCT): CPT

## 2018-01-20 PROCEDURE — 700117 HCHG RX CONTRAST REV CODE 255: Performed by: EMERGENCY MEDICINE

## 2018-01-20 PROCEDURE — 87040 BLOOD CULTURE FOR BACTERIA: CPT

## 2018-01-20 PROCEDURE — 99291 CRITICAL CARE FIRST HOUR: CPT | Performed by: HOSPITALIST

## 2018-01-20 PROCEDURE — 80053 COMPREHEN METABOLIC PANEL: CPT

## 2018-01-20 PROCEDURE — 85055 RETICULATED PLATELET ASSAY: CPT

## 2018-01-20 PROCEDURE — 71045 X-RAY EXAM CHEST 1 VIEW: CPT

## 2018-01-20 PROCEDURE — A9270 NON-COVERED ITEM OR SERVICE: HCPCS | Performed by: HOSPITALIST

## 2018-01-20 PROCEDURE — B548ZZA ULTRASONOGRAPHY OF SUPERIOR VENA CAVA, GUIDANCE: ICD-10-PCS | Performed by: INTERNAL MEDICINE

## 2018-01-20 PROCEDURE — 99291 CRITICAL CARE FIRST HOUR: CPT

## 2018-01-20 PROCEDURE — 83690 ASSAY OF LIPASE: CPT

## 2018-01-20 PROCEDURE — 84484 ASSAY OF TROPONIN QUANT: CPT

## 2018-01-20 PROCEDURE — 87640 STAPH A DNA AMP PROBE: CPT

## 2018-01-20 PROCEDURE — 700101 HCHG RX REV CODE 250

## 2018-01-20 PROCEDURE — 83880 ASSAY OF NATRIURETIC PEPTIDE: CPT

## 2018-01-20 PROCEDURE — 700111 HCHG RX REV CODE 636 W/ 250 OVERRIDE (IP): Performed by: INTERNAL MEDICINE

## 2018-01-20 PROCEDURE — 87641 MR-STAPH DNA AMP PROBE: CPT

## 2018-01-20 PROCEDURE — 96365 THER/PROPH/DIAG IV INF INIT: CPT

## 2018-01-20 PROCEDURE — A9270 NON-COVERED ITEM OR SERVICE: HCPCS | Performed by: INTERNAL MEDICINE

## 2018-01-20 PROCEDURE — 36556 INSERT NON-TUNNEL CV CATH: CPT

## 2018-01-20 PROCEDURE — 85027 COMPLETE CBC AUTOMATED: CPT

## 2018-01-20 PROCEDURE — 93005 ELECTROCARDIOGRAM TRACING: CPT | Performed by: EMERGENCY MEDICINE

## 2018-01-20 PROCEDURE — 700105 HCHG RX REV CODE 258: Performed by: EMERGENCY MEDICINE

## 2018-01-20 PROCEDURE — 700111 HCHG RX REV CODE 636 W/ 250 OVERRIDE (IP): Performed by: HOSPITALIST

## 2018-01-20 PROCEDURE — 87633 RESP VIRUS 12-25 TARGETS: CPT

## 2018-01-20 PROCEDURE — 94760 N-INVAS EAR/PLS OXIMETRY 1: CPT

## 2018-01-20 PROCEDURE — 700111 HCHG RX REV CODE 636 W/ 250 OVERRIDE (IP): Performed by: EMERGENCY MEDICINE

## 2018-01-20 PROCEDURE — 94640 AIRWAY INHALATION TREATMENT: CPT

## 2018-01-20 PROCEDURE — 02HV33Z INSERTION OF INFUSION DEVICE INTO SUPERIOR VENA CAVA, PERCUTANEOUS APPROACH: ICD-10-PCS | Performed by: INTERNAL MEDICINE

## 2018-01-20 RX ORDER — AMOXICILLIN 250 MG
2 CAPSULE ORAL 2 TIMES DAILY
Status: DISCONTINUED | OUTPATIENT
Start: 2018-01-20 | End: 2018-01-26 | Stop reason: HOSPADM

## 2018-01-20 RX ORDER — PREDNISONE 1 MG/1
2 TABLET ORAL DAILY
COMMUNITY
End: 2018-02-28

## 2018-01-20 RX ORDER — BISACODYL 10 MG
10 SUPPOSITORY, RECTAL RECTAL
Status: DISCONTINUED | OUTPATIENT
Start: 2018-01-20 | End: 2018-01-26 | Stop reason: HOSPADM

## 2018-01-20 RX ORDER — ACETAMINOPHEN 325 MG/1
650 TABLET ORAL EVERY 6 HOURS PRN
Status: DISCONTINUED | OUTPATIENT
Start: 2018-01-20 | End: 2018-01-20

## 2018-01-20 RX ORDER — IPRATROPIUM BROMIDE AND ALBUTEROL SULFATE 2.5; .5 MG/3ML; MG/3ML
SOLUTION RESPIRATORY (INHALATION)
Status: COMPLETED
Start: 2018-01-20 | End: 2018-01-20

## 2018-01-20 RX ORDER — SERTRALINE HYDROCHLORIDE 100 MG/1
100 TABLET, FILM COATED ORAL
Status: DISCONTINUED | OUTPATIENT
Start: 2018-01-20 | End: 2018-01-26 | Stop reason: HOSPADM

## 2018-01-20 RX ORDER — OXYCODONE HYDROCHLORIDE 5 MG/1
5 TABLET ORAL EVERY 6 HOURS PRN
Status: DISCONTINUED | OUTPATIENT
Start: 2018-01-20 | End: 2018-01-24

## 2018-01-20 RX ORDER — ONDANSETRON 2 MG/ML
4 INJECTION INTRAMUSCULAR; INTRAVENOUS EVERY 4 HOURS PRN
Status: DISCONTINUED | OUTPATIENT
Start: 2018-01-20 | End: 2018-01-26 | Stop reason: HOSPADM

## 2018-01-20 RX ORDER — TADALAFIL 20 MG/1
40 TABLET ORAL
Status: DISCONTINUED | OUTPATIENT
Start: 2018-01-20 | End: 2018-01-26 | Stop reason: HOSPADM

## 2018-01-20 RX ORDER — SODIUM CHLORIDE 9 MG/ML
500 INJECTION, SOLUTION INTRAVENOUS
Status: DISCONTINUED | OUTPATIENT
Start: 2018-01-20 | End: 2018-01-26 | Stop reason: HOSPADM

## 2018-01-20 RX ORDER — TRAZODONE HYDROCHLORIDE 50 MG/1
50 TABLET ORAL
Status: DISCONTINUED | OUTPATIENT
Start: 2018-01-20 | End: 2018-01-26 | Stop reason: HOSPADM

## 2018-01-20 RX ORDER — PREDNISONE 5 MG/1
5 TABLET ORAL DAILY
COMMUNITY
End: 2018-04-27

## 2018-01-20 RX ORDER — MORPHINE SULFATE 15 MG/1
15 TABLET ORAL EVERY 4 HOURS PRN
Status: DISCONTINUED | OUTPATIENT
Start: 2018-01-20 | End: 2018-01-24

## 2018-01-20 RX ORDER — LINEZOLID 2 MG/ML
600 INJECTION, SOLUTION INTRAVENOUS EVERY 12 HOURS
Status: DISCONTINUED | OUTPATIENT
Start: 2018-01-20 | End: 2018-01-21

## 2018-01-20 RX ORDER — LEVOTHYROXINE SODIUM 137 UG/1
137 TABLET ORAL
Status: DISCONTINUED | OUTPATIENT
Start: 2018-01-20 | End: 2018-01-26 | Stop reason: HOSPADM

## 2018-01-20 RX ORDER — DEXTROSE MONOHYDRATE 25 G/50ML
25 INJECTION, SOLUTION INTRAVENOUS
Status: DISCONTINUED | OUTPATIENT
Start: 2018-01-20 | End: 2018-01-25

## 2018-01-20 RX ORDER — ONDANSETRON 4 MG/1
4 TABLET, ORALLY DISINTEGRATING ORAL EVERY 4 HOURS PRN
Status: DISCONTINUED | OUTPATIENT
Start: 2018-01-20 | End: 2018-01-26 | Stop reason: HOSPADM

## 2018-01-20 RX ORDER — PROMETHAZINE HYDROCHLORIDE 25 MG/1
12.5-25 TABLET ORAL EVERY 4 HOURS PRN
Status: DISCONTINUED | OUTPATIENT
Start: 2018-01-20 | End: 2018-01-26 | Stop reason: HOSPADM

## 2018-01-20 RX ORDER — ONDANSETRON 2 MG/ML
4 INJECTION INTRAMUSCULAR; INTRAVENOUS ONCE
Status: ACTIVE | OUTPATIENT
Start: 2018-01-20 | End: 2018-01-21

## 2018-01-20 RX ORDER — IPRATROPIUM BROMIDE AND ALBUTEROL SULFATE 2.5; .5 MG/3ML; MG/3ML
3 SOLUTION RESPIRATORY (INHALATION)
Status: DISCONTINUED | OUTPATIENT
Start: 2018-01-20 | End: 2018-01-26 | Stop reason: HOSPADM

## 2018-01-20 RX ORDER — PRAVASTATIN SODIUM 20 MG
20 TABLET ORAL DAILY
Status: DISCONTINUED | OUTPATIENT
Start: 2018-01-20 | End: 2018-01-26 | Stop reason: HOSPADM

## 2018-01-20 RX ORDER — SODIUM CHLORIDE 9 MG/ML
INJECTION, SOLUTION INTRAVENOUS CONTINUOUS
Status: DISCONTINUED | OUTPATIENT
Start: 2018-01-20 | End: 2018-01-26 | Stop reason: HOSPADM

## 2018-01-20 RX ORDER — TIOTROPIUM BROMIDE 18 UG/1
1 CAPSULE ORAL; RESPIRATORY (INHALATION) DAILY
Status: DISCONTINUED | OUTPATIENT
Start: 2018-01-20 | End: 2018-01-26 | Stop reason: HOSPADM

## 2018-01-20 RX ORDER — LACTULOSE 10 G/15ML
20 SOLUTION ORAL 2 TIMES DAILY
Status: DISCONTINUED | OUTPATIENT
Start: 2018-01-20 | End: 2018-01-20

## 2018-01-20 RX ORDER — OMEPRAZOLE 20 MG/1
40 CAPSULE, DELAYED RELEASE ORAL DAILY
Status: DISCONTINUED | OUTPATIENT
Start: 2018-01-20 | End: 2018-01-26 | Stop reason: HOSPADM

## 2018-01-20 RX ORDER — PROMETHAZINE HYDROCHLORIDE 25 MG/1
12.5-25 SUPPOSITORY RECTAL EVERY 4 HOURS PRN
Status: DISCONTINUED | OUTPATIENT
Start: 2018-01-20 | End: 2018-01-26 | Stop reason: HOSPADM

## 2018-01-20 RX ORDER — MORPHINE SULFATE 4 MG/ML
4 INJECTION, SOLUTION INTRAMUSCULAR; INTRAVENOUS ONCE
Status: ACTIVE | OUTPATIENT
Start: 2018-01-20 | End: 2018-01-21

## 2018-01-20 RX ORDER — LACTULOSE 10 G/15ML
300 SOLUTION ORAL EVERY 6 HOURS PRN
Status: DISCONTINUED | OUTPATIENT
Start: 2018-01-20 | End: 2018-01-21

## 2018-01-20 RX ORDER — MYCOPHENOLATE MOFETIL 250 MG/1
500 CAPSULE ORAL 2 TIMES DAILY
Status: DISCONTINUED | OUTPATIENT
Start: 2018-01-20 | End: 2018-01-26 | Stop reason: HOSPADM

## 2018-01-20 RX ORDER — ALPRAZOLAM 1 MG/1
1 TABLET ORAL 3 TIMES DAILY PRN
Status: DISCONTINUED | OUTPATIENT
Start: 2018-01-20 | End: 2018-01-26 | Stop reason: HOSPADM

## 2018-01-20 RX ORDER — POLYETHYLENE GLYCOL 3350 17 G/17G
1 POWDER, FOR SOLUTION ORAL
Status: DISCONTINUED | OUTPATIENT
Start: 2018-01-20 | End: 2018-01-26 | Stop reason: HOSPADM

## 2018-01-20 RX ORDER — SODIUM CHLORIDE, SODIUM LACTATE, POTASSIUM CHLORIDE, CALCIUM CHLORIDE 600; 310; 30; 20 MG/100ML; MG/100ML; MG/100ML; MG/100ML
INJECTION, SOLUTION INTRAVENOUS ONCE
Status: COMPLETED | OUTPATIENT
Start: 2018-01-20 | End: 2018-01-20

## 2018-01-20 RX ORDER — SODIUM CHLORIDE 9 MG/ML
30 INJECTION, SOLUTION INTRAVENOUS ONCE
Status: COMPLETED | OUTPATIENT
Start: 2018-01-20 | End: 2018-01-21

## 2018-01-20 RX ORDER — AMBRISENTAN 10 MG/1
10 TABLET, FILM COATED ORAL DAILY
Status: DISCONTINUED | OUTPATIENT
Start: 2018-01-20 | End: 2018-01-26 | Stop reason: HOSPADM

## 2018-01-20 RX ADMIN — TADALAFIL 40 MG: 20 TABLET ORAL at 21:00

## 2018-01-20 RX ADMIN — LINEZOLID 600 MG: 600 INJECTION, SOLUTION INTRAVENOUS at 06:50

## 2018-01-20 RX ADMIN — ALPRAZOLAM 1 MG: 1 TABLET ORAL at 21:32

## 2018-01-20 RX ADMIN — SODIUM CHLORIDE: 9 INJECTION, SOLUTION INTRAVENOUS at 05:31

## 2018-01-20 RX ADMIN — ASPIRIN 81 MG: 81 TABLET, COATED ORAL at 10:19

## 2018-01-20 RX ADMIN — TRAZODONE HYDROCHLORIDE 50 MG: 50 TABLET ORAL at 21:38

## 2018-01-20 RX ADMIN — OMEPRAZOLE 40 MG: 20 CAPSULE, DELAYED RELEASE ORAL at 10:19

## 2018-01-20 RX ADMIN — AMBRISENTAN 10 MG: 10 TABLET, FILM COATED ORAL at 13:57

## 2018-01-20 RX ADMIN — IPRATROPIUM BROMIDE AND ALBUTEROL SULFATE 3 ML: 2.5; .5 SOLUTION RESPIRATORY (INHALATION) at 04:39

## 2018-01-20 RX ADMIN — CEFTRIAXONE 2 G: 2 INJECTION, POWDER, FOR SOLUTION INTRAMUSCULAR; INTRAVENOUS at 05:32

## 2018-01-20 RX ADMIN — TACROLIMUS 1.5 MG: 1 CAPSULE ORAL at 10:27

## 2018-01-20 RX ADMIN — LINEZOLID 600 MG: 600 INJECTION, SOLUTION INTRAVENOUS at 21:37

## 2018-01-20 RX ADMIN — SODIUM CHLORIDE: 9 INJECTION, SOLUTION INTRAVENOUS at 07:22

## 2018-01-20 RX ADMIN — IPRATROPIUM BROMIDE AND ALBUTEROL SULFATE 3 ML: .5; 3 SOLUTION RESPIRATORY (INHALATION) at 04:39

## 2018-01-20 RX ADMIN — AMPICILLIN SODIUM AND SULBACTAM SODIUM 3 G: 2; 1 INJECTION, POWDER, FOR SOLUTION INTRAMUSCULAR; INTRAVENOUS at 05:07

## 2018-01-20 RX ADMIN — METHYLNALTREXONE BROMIDE 12 MG: 12 INJECTION, SOLUTION SUBCUTANEOUS at 02:22

## 2018-01-20 RX ADMIN — PREDNISONE 7 MG: 1 TABLET ORAL at 10:19

## 2018-01-20 RX ADMIN — POLYETHYLENE GLYCOL 3350 1 PACKET: 17 POWDER, FOR SOLUTION ORAL at 15:23

## 2018-01-20 RX ADMIN — STANDARDIZED SENNA CONCENTRATE AND DOCUSATE SODIUM 2 TABLET: 8.6; 5 TABLET, FILM COATED ORAL at 10:19

## 2018-01-20 RX ADMIN — IOHEXOL 100 ML: 350 INJECTION, SOLUTION INTRAVENOUS at 03:41

## 2018-01-20 RX ADMIN — TAZOBACTAM SODIUM AND PIPERACILLIN SODIUM 3.38 G: 375; 3 INJECTION, SOLUTION INTRAVENOUS at 07:22

## 2018-01-20 RX ADMIN — SERTRALINE 100 MG: 100 TABLET, FILM COATED ORAL at 21:32

## 2018-01-20 RX ADMIN — TACROLIMUS 1.5 MG: 1 CAPSULE ORAL at 21:37

## 2018-01-20 RX ADMIN — MORPHINE SULFATE 15 MG: 15 TABLET ORAL at 07:49

## 2018-01-20 RX ADMIN — METHYLNALTREXONE BROMIDE 12 MG: 12 INJECTION, SOLUTION SUBCUTANEOUS at 07:20

## 2018-01-20 RX ADMIN — LEVOTHYROXINE SODIUM 137 MCG: 137 TABLET ORAL at 07:49

## 2018-01-20 RX ADMIN — MYCOPHENOLATE MOFETIL 500 MG: 250 CAPSULE ORAL at 21:38

## 2018-01-20 RX ADMIN — SODIUM CHLORIDE: 9 INJECTION, SOLUTION INTRAVENOUS at 17:46

## 2018-01-20 RX ADMIN — AZITHROMYCIN FOR INJECTION INJECTION, POWDER, LYOPHILIZED, FOR SOLUTION 500 MG: 500 INJECTION INTRAVENOUS at 06:37

## 2018-01-20 RX ADMIN — OXYCODONE HYDROCHLORIDE 5 MG: 5 TABLET ORAL at 16:05

## 2018-01-20 RX ADMIN — ONDANSETRON HYDROCHLORIDE 4 MG: 2 INJECTION, SOLUTION INTRAMUSCULAR; INTRAVENOUS at 19:45

## 2018-01-20 RX ADMIN — MYCOPHENOLATE MOFETIL 500 MG: 250 CAPSULE ORAL at 10:20

## 2018-01-20 RX ADMIN — ONDANSETRON HYDROCHLORIDE 4 MG: 2 INJECTION, SOLUTION INTRAMUSCULAR; INTRAVENOUS at 08:49

## 2018-01-20 RX ADMIN — MORPHINE SULFATE 15 MG: 15 TABLET ORAL at 17:54

## 2018-01-20 RX ADMIN — SODIUM CHLORIDE 2445 ML: 9 INJECTION, SOLUTION INTRAVENOUS at 06:00

## 2018-01-20 RX ADMIN — SODIUM CHLORIDE, POTASSIUM CHLORIDE, SODIUM LACTATE AND CALCIUM CHLORIDE: 600; 310; 30; 20 INJECTION, SOLUTION INTRAVENOUS at 03:15

## 2018-01-20 RX ADMIN — ALPRAZOLAM 1 MG: 1 TABLET ORAL at 10:19

## 2018-01-20 RX ADMIN — PRAVASTATIN SODIUM 20 MG: 20 TABLET ORAL at 10:19

## 2018-01-20 ASSESSMENT — ENCOUNTER SYMPTOMS
SPUTUM PRODUCTION: 1
WHEEZING: 0
CHILLS: 1
MYALGIAS: 1
HEADACHES: 0
ABDOMINAL PAIN: 0
COUGH: 1
TINGLING: 0
VOMITING: 0
NAUSEA: 0
PALPITATIONS: 0
SHORTNESS OF BREATH: 1
PHOTOPHOBIA: 0
FEVER: 1
DIARRHEA: 0
FOCAL WEAKNESS: 0
SORE THROAT: 0
DIZZINESS: 0
DEPRESSION: 0

## 2018-01-20 ASSESSMENT — PAIN SCALES - GENERAL
PAINLEVEL_OUTOF10: 7
PAINLEVEL_OUTOF10: 6
PAINLEVEL_OUTOF10: 0
PAINLEVEL_OUTOF10: 6
PAINLEVEL_OUTOF10: 7
PAINLEVEL_OUTOF10: 4
PAINLEVEL_OUTOF10: 4
PAINLEVEL_OUTOF10: 0
PAINLEVEL_OUTOF10: 4
PAINLEVEL_OUTOF10: 6
PAINLEVEL_OUTOF10: 7
PAINLEVEL_OUTOF10: 6

## 2018-01-20 ASSESSMENT — COPD QUESTIONNAIRES
COPD SCREENING SCORE: 3
DO YOU EVER COUGH UP ANY MUCUS OR PHLEGM?: NO/ONLY WITH OCCASIONAL COLDS OR INFECTIONS
DURING THE PAST 4 WEEKS HOW MUCH DID YOU FEEL SHORT OF BREATH: SOME OF THE TIME
HAVE YOU SMOKED AT LEAST 100 CIGARETTES IN YOUR ENTIRE LIFE: NO/DON'T KNOW

## 2018-01-20 ASSESSMENT — LIFESTYLE VARIABLES
DO YOU DRINK ALCOHOL: NO
EVER_SMOKED: NEVER
EVER_SMOKED: NEVER
ALCOHOL_USE: NO

## 2018-01-20 NOTE — PROGRESS NOTES
0500- RN called SAURABH Gore RN for report on Pt    0518 - ACLS RN x2 and CCT transport Pt on O2 and monitor to [facility].    0545- Gonda, MD at bedside    0550- central line placed and verified by Gonda, MD    0555- blood cultures drawn, ABX and fluid bolus started

## 2018-01-20 NOTE — CARE PLAN
Problem: Fluid Volume:  Goal: Will maintain balanced intake and output  Outcome: PROGRESSING AS EXPECTED    Intervention: Monitor, educate, and encourage compliance with therapeutic intake of liquids  Fluid boluses administered per MD order. Patient educated regarding importance of fluid resuscitation.       Problem: Skin Integrity  Goal: Risk for impaired skin integrity will decrease  Outcome: PROGRESSING AS EXPECTED  Patient turns frequently. Pillows used for support and repositioning. Draw sheet used to decrease friction. Mepilex in place on sacrum.

## 2018-01-20 NOTE — PROGRESS NOTES
RCC     See Dr Gonda's note    Bp better after 2500 bolus  SBp 120s  SR 90s  Nausea - sips only  UO adequate  Tm 98.2  Low WBC    HF NC 40 lpm 55%  WOB good    plt 56 - no heparins  Zyvox/Zosyn/Azithro    Sputum/flu pending  BC sent

## 2018-01-20 NOTE — ASSESSMENT & PLAN NOTE
Secondary to pneumonia with admission to the ICU.  She is immunosuppressed due to liver transplantation.     Sepsis has resolved

## 2018-01-20 NOTE — PROGRESS NOTES
Patient's  Otis was called per pharmacy request. He was asked to bring in 3 of the patient's home medications that are not stocked in the in house pharmacy; medications include Letairis, Linzess, and Adcirca. Patient's  confirmed that he would bring them to the RN today.

## 2018-01-20 NOTE — CONSULTS
ID Note    Dictation to follow    Requesting Physician: Dr Real  Reason - Severe Sepsis  Consulting: Dr Boston    56yo female known to ID service. PMH complicated and known to involve sarcoidosis, liver   failure, status post transplant in 2011, pulmonary arterial hypertension with 5 liter O2 need.    Presented with SOB, COugh, Chills, abdominal pains and bloating. CT scan suggests PNA as potential source for her sepsis. Has had in past Adrenal insufficiency issues. None obvious at this time. Abx selection of linezolid/azithromycin/zosyn ok for now.   Will check some viral panels, atypical organism testing and repeat blood cultures. Would like to titrate down abx soon.    Thank you for this consult. We will continue to follow along with you.  Discussed with Dr Flores.    Dr Boston

## 2018-01-20 NOTE — H&P
" Hospital Medicine History and Physical    Date of Service  1/20/2018    Chief Complaint  Chief Complaint   Patient presents with   • Shortness of Breath     Pt. states increasing sob starting 3-4 days ago. Pt. given 1 albuterol treatment per [facility].   • Abdominal Pain     Pt. states constipation. Pt. has hx of liver transplant. Pt. states \"my spleen hurts.\"       History of Presenting Illness  57 y.o. female who presented on 1/20/2018 with Worsening shortness of breath over the last 3-4 days as well as constipation after she ran out of her Relistor which she takes along with her chronic narcotic therapy. Patient states that she has chronic respiratory failure and uses 5 L of oxygen at baseline. In the last 3-4 days, she has had increased malaise, fatigue, and cough productive of sputum but she is unsure what color it is because she typically swallows it. She's had no fevers but does describe chills, and body aches. She did get a flu shot this year and denies any sick contacts. The patient also complains of worsening diffuse abdominal pain with her last bowel movement being 5 days ago. The patient typically takes Relistor for her drug induced constipation but she ran out of her medication when her insurance stopped covering the prescription recently. She otherwise denies any headaches, chest pain, diarrhea, or dysuria.        Primary Care Physician  Bernarda Damon D.O.    Consultants  Dr. Gonda, pulmonary medicine    Code Status  Full    Review of Systems  Review of Systems   Constitutional: Positive for chills, fever and malaise/fatigue.   HENT: Negative for congestion and sore throat.    Eyes: Negative for photophobia.   Respiratory: Positive for cough, sputum production and shortness of breath. Negative for wheezing.    Cardiovascular: Negative for chest pain and palpitations.   Gastrointestinal: Negative for abdominal pain, diarrhea, nausea and vomiting.   Genitourinary: Negative for dysuria. " "  Musculoskeletal: Positive for myalgias.   Skin: Negative.    Neurological: Negative for dizziness, tingling, focal weakness and headaches.   Psychiatric/Behavioral: Negative for depression and suicidal ideas.        Past Medical History  Past Medical History:   Diagnosis Date   • Low back pain 02-17-17    and left foot, 7/10   • H/O Clostridium difficile infection 02-17-17    reports \"16 months ago\". Current stool sample 01-26-17 neg   • Cirrhosis (CMS-HCC) December 2011    Status post liver transplant at [facility] [place]   • Breath shortness    • Bronchitis      2016   • Cardiomegaly    • Chronic fatigue and malaise    • CKD (chronic kidney disease) stage 3, GFR 30-59 ml/min    • Diabetes (CMS-HCC)     reports hx of, resolved w/weight loss. Reports still checking bloodsugars twice daily and using insulin PRN   • Fracture of left foot    • GERD (gastroesophageal reflux disease)         • Hypothyroid    • Mild aortic regurgitation and aortic valve sclerosis     • On home oxygen therapy     4 liters, Dr. Gaming   • Platelet disorder (CMS-HCC)     low platelets   • Pneumonia     aspiration,    • Psychiatric disorder     Mood disorder   • Pulmonary hypertension        • S/P cholecystectomy    • Small bowel obstruction     01-   • Splenomegaly    • VRE (vancomycin-resistant Enterococci)     02-17-17, pt declines knowledge of this       Surgical History  Past Surgical History:   Procedure Laterality Date   • COLONOSCOPY N/A 3/27/2017    Procedure: COLONOSCOPY;  Surgeon: Osman Matt M.D.;  Location: SURGERY SAME DAY [facility] ORS;  Service:    • GASTROSCOPY N/A 3/27/2017    Procedure: GASTROSCOPY;  Surgeon: Osman Matt M.D.;  Location: SURGERY SAME DAY [facility] ORS;  Service:    • BREAST BIOPSY Left 2/21/2017    Procedure: BREAST BIOPSY - WIRE LOCALIZED EXCISONAL ;  Surgeon: Jane Zhao M.D.;  Location: SURGERY SAME DAY [facility] ORS;  Service:    • LUNG BIOPSY OPEN  11/2016   • OTHER ABDOMINAL SURGERY  "     Gasric Sleeve   • BONE MARROW ASPIRATION  3/16/2015    Performed by Marlena Hi M.D. at ENDOSCOPY [facility]   • BONE MARROW BIOPSY, NDL/TROCAR  3/16/2015    Performed by Marlena Hi M.D. at ENDOSCOPY [facility]   • RECOVERY  3/31/2014    Performed by [facility] at SURGERY [facility]   • RECOVERY  3/24/2014    Performed by Cath-Recovery Surgery at SURGERY SAME DAY [facility]   • RECOVERY  1/21/2014    Performed by [facility] at SURGERY SAME DAY [facility]   • BRONCHOSCOPY-ENDO  11/15/2013    Performed by Ha Perez M.D. at ENDOSCOPY [facility]   • RECOVERY  2/27/2013    Performed by [facility] Surgery at SURGERY SAME DAY [facility]   • BONE MARROW ASPIRATION  12/31/2012    Performed by Josemanuel Real M.D. at ENDOSCOPY [facility]   • BONE MARROW BIOPSY, NDL/TROCAR  12/31/2012    Performed by Josemanuel Real M.D. at ENDOSCOPY [facility]   • GASTROSCOPY  9/28/2012    Performed by Aravind Richards M.D. at SURGERY [facility]   • SIGMOIDOSCOPY FLEX  9/26/2012    Performed by Kristopher Cardoso M.D. at ENDOSCOPY [facility]   • BRONCHOSCOPY-ENDO  6/19/2012    Performed by MARLENA MURILLO at ENDOSCOPY [facility]   • BRONCHOSCOPY-ENDO  5/29/2012    Performed by SUYAPA CAMARENA at ENDOSCOPY [facility]   • OTHER ABDOMINAL SURGERY  December 2011    [place] [facility] [place]   • GASTROSCOPY-ENDO  3/12/2010    Performed by CAMELIA SAMAON at ENDOSCOPY [facility]   • EXAM UNDER ANESTHESIA  11/11/2009    Performed by BIRD ABDI at SURGERY [facility]   • HEMORRHOIDECTOMY  11/11/2009    Performed by BIRD ABDI at SURGERY [facility]   • KELBY BY LAPAROSCOPY  9/19/2009    Performed by BIRD ABDI at SURGERY [facility]   • CARPAL TUNNEL RELEASE          • HYSTERECTOMY, TOTAL ABDOMINAL          • OTHER      Breast reduction   • PB ANESTH,NOSE,SINUS SURGERY     • TONSILLECTOMY          Medications  No current facility-administered medications on file prior to encounter.      Current Outpatient Prescriptions on File Prior to Encounter   Medication Sig Dispense Refill   • methylnaltrexone (RELISTOR) 12 MG/0.6ML Solution Inject 0.6 mL as instructed 1 time daily as needed for up to 30 days. 18 mL 3   • Naloxegol Oxalate (MOVANTIK) 25 MG Tab Take 25 mg by mouth every day. 30 Tab 1   • furosemide (LASIX) 40 MG Tab TAKE 1 TAB BY MOUTH EVERY DAY. 30 Tab 6   • alprazolam (XANAX) 1 MG Tab Take 1 Tab by mouth 3 times a day as needed for Anxiety for up to 30 days. 90 Tab 2   • [START ON 2/15/2018] oxycodone immediate release (ROXICODONE) 10 MG immediate release tablet Take 1 Tab by mouth 3 times a day as needed for Severe Pain for up to 30 days. 90 Tab 0   • [START ON 3/17/2018] oxycodone immediate release (ROXICODONE) 10 MG immediate release tablet Take 1 Tab by mouth 3 times a day as needed for Moderate Pain for up to 30 days. 90 Tab 0   • oxycodone immediate release (ROXICODONE) 10 MG immediate release tablet Take 1 Tab by mouth 3 times a day as needed for Moderate Pain or Severe Pain for up to 30 days. 90 Tab 0   • furosemide (LASIX) 40 MG Tab Take 40 mg by mouth every day. TAKE 1 TAB BY MOUTH EVERY DAY.  6   • predniSONE (DELTASONE) 5 MG Tab Take 7 mg by mouth every day.  5   • calcium citrate 315 mg-vitamin D 200 mg (CITRACAL+D) 315-200 MG-UNIT per tablet Take 1 Tab by mouth every day. 90 Tab 3   • lactulose 10 GM/15ML Solution Take 30 mL by mouth 2 times a day. 1 Bottle 3   • FREESTYLE LITE strip USE TO TEST EVERY DAY AS NEEDED SSX HIGH OR  Strip 3   • Linaclotide (LINZESS) 290 MCG Cap Take 1 Cap by mouth every day. 90 Cap 3   • bisacodyl (DULCOLAX) 10 MG Suppos Insert 1 Suppository in rectum 1 time daily as needed (constipation). 30 Suppository 3   • trazodone (DESYREL) 50 MG Tab TAKE 1-2 TABS BY MOUTH EVERY BEDTIME. 180 Tab 3   • aspirin 81 MG tablet Take 1 Tab by mouth every day. 90 Tab  3   • sertraline (ZOLOFT) 100 MG Tab Take 1 Tab by mouth every bedtime. 90 Tab 4   • fluticasone (FLONASE) 50 MCG/ACT nasal spray SPRAY 1 SPRAY IN EACH NOSTRIL TWICE A DAY 48 g 3   • levothyroxine (SYNTHROID) 137 MCG Tab Take 1 Tab by mouth Every morning on an empty stomach. 90 Tab 3   • omeprazole (PRILOSEC) 40 MG delayed-release capsule Take 1 Cap by mouth every day. 90 Cap 3   • ACIDOPHILUS LACTOBACILLUS PO Take  by mouth.     • SENNOSIDES PO Take  by mouth.     • tacrolimus (PROGRAF) 1 MG Cap TAKE 1 CAPSULE BY MOUTH EVERY MORNING AND 1 CAP IN THE EVENING-TAKE WITH 0.5MG  11   • Calcium Citrate-Vitamin D (CALCIUM CITRATE + D) 315-250 MG-UNIT Tab Take  by mouth.     • Ferrous Sulfate Powder by Does not apply route.     • non-formulary med Inhale 5 L by mouth Continuous. Oxygen     • morphine (MS IR) 15 MG tablet Take 1 Tab by mouth every four hours as needed for Severe Pain. 30 Tab 0   • NON SPECIFIED Take 1 Cap by mouth every evening. Bariatric Dietary Supplment      • tacrolimus (PROGRAF) 0.5 MG Cap Take 1.5 mg by mouth 2 Times a Day. Pt uses a 1 mg and 0.5 mg cap      • Naloxegol Oxalate 25 MG Tab Take 25 mg by mouth every day. 30 Tab 2   • predniSONE (DELTASONE) 1 MG Tab Take 7 Tabs by mouth every day. 7 Tab 0   • Wheat Dextrin (BENEFIBER) Powder Take 3.8 g by mouth every day.     • polyethylene glycol 3350 (MIRALAX) Powder Take 1 g by mouth every day.     • ambrisentan (LETAIRIS) 10 MG tablet Take 1 Tab by mouth every day. 30 Tab 11   • pravastatin (PRAVACHOL) 20 MG Tab TAKE 1 TAB BY MOUTH EVERY DAY. 30 Tab 11   • Tadalafil, PAH, (ADCIRCA) 20 MG Tab Take 40 mg by mouth every bedtime. Indications: Pulmonary Arterial Hypertension 180 Tab 3   • cyclobenzaprine (FLEXERIL) 10 MG Tab Take 10 mg by mouth 3 times a day as needed for Muscle Spasms.     • ferrous sulfate (FEOSOL) 220 (44 FE) MG/5ML Elixir Take 5 mL by mouth every day. 1 Bottle 10   • CITRACAL MAXIMUM 315-250 MG-UNIT Tab TAKE 2 TABS BY MOUTH 2X/ DAY  WITH FOOD BEFORE AND AFTER DINNER FOR LIFE-CRUSH 1ST 3 MONTH, SPACE  Tab 11   • ascorbic acid (ASCORBIC ACID) 500 MG Tab Take 500 mg by mouth every day.     • sennosides (SENOKOT) 8.6 MG Tab Take 17.2 mg by mouth 2 times a day.     • Probiotic Product (PROBIOTIC DAILY PO) Take 1 Cap by mouth every day.     • ondansetron (ZOFRAN ODT) 4 MG TABLET DISPERSIBLE Take 1 Tab by mouth every 8 hours as needed for Nausea/Vomiting. Nausea and vomiting 270 Tab 4   • tiotropium (SPIRIVA) 18 MCG Cap Inhale 1 Cap by mouth every day. 90 Cap 4   • mycophenolate (CELLCEPT) 250 MG Cap Take 500 mg by mouth 2 times a day.         Family History  Family History   Problem Relation Age of Onset   • Other Father      Unknown (dead 10 years)   • Diabetes Father    • Heart Disease Father    • Hypertension Father    • Hyperlipidemia Father    • Stroke Father    • Non-contributory Mother    • Hyperlipidemia Mother    • Alcohol/Drug Mother    • Cancer Paternal Aunt    • Alcohol/Drug Maternal Grandmother    • Alcohol/Drug Maternal Grandfather        Social History  Social History   Substance Use Topics   • Smoking status: Passive Smoke Exposure - Never Smoker   • Smokeless tobacco: Never Used      Comment: avoid all tobacco products   • Alcohol use No      Comment: 2009 quit drinking       Allergies  Allergies   Allergen Reactions   • Rhubarb Anaphylaxis   • Organic Nitrates      Nitroglycerin products should be avoided with the use of PDE-5 inhibitors as the combination can result in severe hypotension.   • Vancomycin Hcl      Causes red man syndrome when infused to fast          Physical Exam  Laboratory   Hemodynamics  Temp (24hrs), Av.8 °C (98.2 °F), Min:36.8 °C (98.2 °F), Max:36.8 °C (98.2 °F)   Temperature: 36.8 °C (98.2 °F)  Pulse  Av.2  Min: 65  Max: 87 Heart Rate (Monitored): 75  Blood Pressure: 110/56, NIBP: 121/47      Respiratory      Respiration: 18, Pulse Oximetry: 97 %             Physical Exam   Constitutional:  She is oriented to person, place, and time. No distress.   Chronically ill-appearing   HENT:   Head: Normocephalic and atraumatic.   Right Ear: External ear normal.   Left Ear: External ear normal.   Eyes: EOM are normal. Right eye exhibits no discharge. Left eye exhibits no discharge.   Neck: Neck supple. No JVD present.   Cardiovascular: Normal rate, regular rhythm and normal heart sounds.    Pulmonary/Chest: Effort normal and breath sounds normal. No respiratory distress. She exhibits no tenderness.   Poor air movement, diminished at the bases, no wheezes heard   Abdominal: Soft. Bowel sounds are normal. She exhibits no distension. There is no tenderness.   Musculoskeletal: Normal range of motion. She exhibits no edema.   Neurological: She is alert and oriented to person, place, and time. No cranial nerve deficit.   Skin: Skin is warm and dry. She is not diaphoretic. No erythema.   Psychiatric: She has a normal mood and affect. Her behavior is normal.   Nursing note and vitals reviewed.    Capillary refill less than 3 seconds, distal pulses intact    Recent Labs      01/20/18   0252   WBC  2.7*   RBC  4.00*   HEMOGLOBIN  11.7*   HEMATOCRIT  35.8*   MCV  89.5   MCH  29.3   MCHC  32.7*   RDW  46.2   PLATELETCT  56*   MPV  9.4     Recent Labs      01/20/18   0203   SODIUM  139   POTASSIUM  4.1   CHLORIDE  102   CO2  28   GLUCOSE  118*   BUN  25*   CREATININE  1.24   CALCIUM  9.3     Recent Labs      01/20/18   0203   ALTSGPT  18   ASTSGOT  46*   ALKPHOSPHAT  31   TBILIRUBIN  0.4   LIPASE  29   GLUCOSE  118*         Recent Labs      01/20/18   0252   BNPBTYPENAT  31         Lab Results   Component Value Date    TROPONINI <0.01 01/20/2018       Imaging  Ct-chest (thorax) W/o    Result Date: 1/19/2018 1/19/2018 11:28 AM HISTORY/REASON FOR EXAM:  Shortness of breath. History of aspiration pneumonia. History of sarcoidosis. TECHNIQUE/EXAM DESCRIPTION: CT scan of the chest without contrast. Thin-section helical images  were obtained from the lung apices through the adrenal glands. Low dose optimization technique was utilized for this CT exam including automated exposure control and adjustment of the mA and/or kV according to patient size. COMPARISON:  6/22/2017. FINDINGS: The study is limited due to non-use of intravenous contrast.  The mediastinum and hilum is not well evaluated. The previously demonstrated right lower lobe and left lower lobe infiltrates have almost completely cleared. Left lower lobe volume loss is demonstrated. Residual subsegmental opacities within the left lower lobe are demonstrated. There are scattered linear opacities within the right lower lobe. Postoperative changes involving the right lung with sutures within the major fissure. The lungs are hyperexpanded and emphysematous. Apical blebs are demonstrated. No pleural effusions are identified. A 2.9 mm superior segment right lower lobe nodule image 39 is probably unchanged. Limited evaluation of the mediastinum and hilar without contrast. No enlarged mediastinal lymph nodes are identified. Heart is normal in size. Small pericardial fluid collections. Mild calcified plaque is located within the aorta. Calcifications are located within the aortic valve. There are postoperative changes involving the interatrial septum. The spleen is enlarged. A 2.7 cm hypodense lesion within the spleen appears unchanged. There are postoperative changes involving the stomach. Surgical clips are located at the inferior margin of the left lobe of the liver. The gallbladder surgically absent. There is moderate right convex scoliosis of the thoracic spine degenerative changes.     1.  Previously demonstrated right lower lobe and left lower lobe infiltrates have almost completely cleared. 2.  Left lower lobe volume loss and residual subcutaneous segmental atelectasis/possible pneumonitis demonstrated. No pleural effusions. 3.  Hyperexpanded and emphysematous lungs. 4.  A 2.9 mm  superior segment right lower lobe nodule is probably unchanged. 5.  Splenomegaly and 2.7 cm hypodense lesion within the spleen unchanged. 6.  Limited evaluation of the mediastinum and hilar without contrast. No mediastinal adenopathy identified. Nodule less than 6 mm: Low Risk: No routine follow-up High Risk: Optional CT at 12 months Comments: Nodules less than 6 mm do not require routine follow-up, but certain patients at high risk with suspicious nodule morphology, upper lobe location, or both may warrant 12-month follow-up. Low Risk - Minimal or absent history of smoking and of other known risk factors. High Risk - History of smoking or of other known risk factors. Note: These recommendations do not apply to lung cancer screening, patients with immunosuppression, or patients with known primary cancer. Fleischner Society 2017 Guidelines for Management of Incidentally Detected Pulmonary Nodules in Adults    Dx-chest-portable (1 View)    Result Date: 1/20/2018 1/20/2018 2:29 AM HISTORY/REASON FOR EXAM:  Chest Pain. Shortness of breath TECHNIQUE/EXAM DESCRIPTION AND NUMBER OF VIEWS: Single portable view of the chest. COMPARISON: 11/9/2017 FINDINGS: Cardiac mediastinal contour is unchanged. Lungs show hypoinflation. Patchy linear opacities at both lung bases, worse on the LEFT. No pneumothorax. Dextroconvex curvature of thoracic spine.     Hypoinflation with bibasilar atelectasis and possible superimposed LEFT basilar pneumonia.    Ct-cta Complete Thoracoabdominal Aorta    Result Date: 1/20/2018 1/20/2018 3:13 AM HISTORY/REASON FOR EXAM:  SHORTNESS OF BREATH TECHNIQUE/EXAM DESCRIPTION:  CT angiogram of the chest and abdomen with and without reconstructions. Initial precontrast images were obtained from the lung apices through the diaphragmatic domes. Following this, 100 mL of Omnipaque 350 nonionic contrast was administered at 5 mL/sec and helical scanning was obtained from the lung apices thru the iliac crest and  bifurcation. Thick and thin section multiplanar volume reformats were generated from the axial data set in the sagittal and coronal planes. 3D angiographic images were reviewed on PACS. Maximum intensity projection (MIP) images were generated and reviewed. Low dose optimization technique was utilized for this CT exam including automated exposure control and adjustment of the mA and/or kV according to patient size. COMPARISON:  CT chest 1/19/2018, CT abdomen and pelvis 11/5/2017 FINDINGS: No evidence for acute intramural hematoma in the thoracic aorta on precontrast images. Mild atherosclerotic calcification of thoracic aorta.  No aneurysm or dissection. Mild atherosclerotic calcification of the abdominal aorta.  No aneurysm or dissection. Celiac trunk, superior and inferior mesenteric arteries enhance normally. Renal arteries are patent. Pulmonary arteries are prominent and well opacified, without filling defect to indicate pulmonary embolus. Consolidation of both lower lobes. No pneumothorax. No significant pleural fluid collection. Postoperative change of the stomach. The liver is unremarkable. Splenic cyst again noted.  Spleen is enlarged measuring 13 cm. Adrenal glands are unremarkable. The kidneys are unremarkable. Pancreas is atrophic. Gallbladder is absent. Bladder is unremarkable. Uterus is absent. Increased colonic stool. Postoperative change of the anterior abdominal wall. Dense material present within the distal small bowel.     1.  No thoracic or abdominal aortic aneurysm or dissection. 2.  Findings consistent with pulmonary hypertension. 3.  No CT evidence for pulmonary embolus. 4.  Bilateral lower lobe consolidation, most concerning for pneumonia. 5.  Splenomegaly with splenic cyst again present. 6.  Increased colonic stool.      Assessment/Plan     Anticipate that patient will needGreater than 2 midnights for management of the discussed medical issues.    This dictation was created using voice  recognition software. The accuracy of the dictation is limited to the abilities of the software. I expect there may be some errors of grammar and possibly content.    * Sepsis (CMS-HCC)   Assessment & Plan    The patient has leukopenia, she is chronically immunosuppressed status post liver transplantation. She has evidence of end organ damage with hypotension, acute on chronic respiratory failure with hypoxia as well as acute kidney injury.  Blood pressures initially were in the 80s but has since improved with IV fluid resuscitation. The patient will be admitted to the ICU on sepsis protocol with goal directed therapy including IV crystalloids for blood pressure support and empiric antibiotics for the suspected etiology of bilateral lower lobe pneumonia with ceftriaxone and azithromycin. I will check a flu swab. I will check sputum culture and monitor this along with her blood culture. Low tolerance for initiation of vasopressor therapy for blood pressure support.        CAP (community acquired pneumonia)   Assessment & Plan    Treatment as noted        IRMA (acute kidney injury) (CMS-HCC)   Assessment & Plan    Patient has an element of chronic kidney disease, however this is worst in her usual baseline. Secondary to infection and will treat as noted above. Expect improvement with IV fluids. Repeat renal function in the morning.        Acute on chronic respiratory failure with hypoxia (CMS-HCC)- (present on admission)   Assessment & Plan    Treatment as noted, patient on 5 L of oxygen at baseline for history of pulmonary hypertension and chronic respiratory failure followed by Dr. Gaming of pulmonary medicine. Patient currently requiring a 10 L nonrebreather mask. I will check an ABG. I have discussed this patient with Dr. Gonda of pulmonary medicine, I appreciate his recommendations.        Drug-induced constipation- (present on admission)   Assessment & Plan    Patient's insurance stopped covering her relistor last  month which typically receives her drug induced constipation. I will resume this, and place her on bowel protocol. Okay to continue home long-acting narcotics.        IDDM (insulin dependent diabetes mellitus) (CMS-[facility])- (present on admission)   Assessment & Plan    This is chronic, monitor with Accu-Cheks and cover with insulin sliding scale. Hold any home oral hypoglycemics for now.        Hypothyroidism, adult- (present on admission)   Assessment & Plan    This is chronic and stable, continue home Synthroid.        Status post liver transplant Dr. Canada ([facility])- (present on admission)   Assessment & Plan    Patient chronically immunosuppressed, continue home Prograf and CellCept.          Prophylaxis: Sequential compression devices for DVT prophylaxis, stress dose PPI indicated, stool softeners ordered     Patient is critically ill, greater than 36 minutes of critical care time were spent in the admission, planning, and management of this patient not including procedures and without overlap.

## 2018-01-20 NOTE — ED PROVIDER NOTES
"ED Provider Note    ED Provider Note      Primary care provider: Bernarda Damon D.O.    CHIEF COMPLAINT  Chief Complaint   Patient presents with   • Shortness of Breath     Pt. states increasing sob starting 3-4 days ago. Pt. given 1 albuterol treatment per [facility].   • Abdominal Pain     Pt. states constipation. Pt. has hx of liver transplant. Pt. states \"my spleen hurts.\"       HPI  Roxana Cuba is a 57 y.o. female who presents to the Emergency Department with chief complaint of shortness of breath. Patient states increasing shortness of breath starting 3-4 days ago. Did have outpatient CT scan of the chest performed yesterday. She also states she's had progressive abdominal pain. She status post liver transplant and has a history of splenomegaly. States appear pain in her spleen at this time. She reports subjective chills and frequent cough no measured fever she's had no headache altered mental status nausea or vomiting. She states that she takes chronic narcotics and that she was prescribed Relistor to aid in constipation but has been unable to take this over the last few days. No melena no hematochezia no dysuria hematuria urinary hesitancy or urgency at it at moderate worse with deep inspiration but no alleviating factors.    REVIEW OF SYSTEMS  10 systems reviewed and otherwise negative, pertinent positives and negatives listed in the history of present illness.    PAST MEDICAL HISTORY   has a past medical history of Breath shortness; Bronchitis ( ); Cardiomegaly; Chronic fatigue and malaise; Cirrhosis (CMS-HCC) (December 2011); CKD (chronic kidney disease) stage 3, GFR 30-59 ml/min; Diabetes (CMS-HCC); Fracture of left foot; GERD (gastroesophageal reflux disease); H/O Clostridium difficile infection (02-17-17); Hypothyroid; Low back pain (02-17-17); Mild aortic regurgitation and aortic valve sclerosis ( ); On home oxygen therapy; Platelet disorder (CMS-HCC); Pneumonia; Psychiatric disorder; Pulmonary " hypertension; S/P cholecystectomy; Small bowel obstruction; Splenomegaly; and VRE (vancomycin-resistant Enterococci).    SURGICAL HISTORY   has a past surgical history that includes anesth,nose,sinus surgery; makayla by laparoscopy (9/19/2009); exam under anesthesia (11/11/2009); hysterectomy, total abdominal; other; gastroscopy-endo (3/12/2010); bronchoscopy-endo (5/29/2012); bronchoscopy-endo (6/19/2012); sigmoidoscopy flex (9/26/2012); recovery (2/27/2013); bronchoscopy-endo (11/15/2013); recovery (1/21/2014); recovery (3/24/2014); hemorrhoidectomy (11/11/2009); gastroscopy (9/28/2012); carpal tunnel release; bone marrow aspiration (12/31/2012); bone marrow biopsy, ndl/trocar (12/31/2012); recovery (3/31/2014); other abdominal surgery (December 2011); bone marrow aspiration (3/16/2015); bone marrow biopsy, ndl/trocar (3/16/2015); lung biopsy open (11/2016); tonsillectomy; other abdominal surgery (); breast biopsy (Left, 2/21/2017); colonoscopy (N/A, 3/27/2017); and gastroscopy (N/A, 3/27/2017).    SOCIAL HISTORY  Social History   Substance Use Topics   • Smoking status: Passive Smoke Exposure - Never Smoker   • Smokeless tobacco: Never Used      Comment: avoid all tobacco products   • Alcohol use No      Comment: 05/2009 quit drinking      History   Drug Use No       FAMILY HISTORY  Non-Contributory    CURRENT MEDICATIONS  Home Medications     Reviewed by Sofía Ricci R.N. (Registered Nurse) on 01/20/18 at 0132  Med List Status: Partial   Medication Last Dose Status   ACIDOPHILUS LACTOBACILLUS PO Taking Active   alprazolam (XANAX) 1 MG Tab  Active   ambrisentan (LETAIRIS) 10 MG tablet Taking Active   ascorbic acid (ASCORBIC ACID) 500 MG Tab Taking Active   aspirin 81 MG tablet  Active   bisacodyl (DULCOLAX) 10 MG Suppos  Active   calcium citrate 315 mg-vitamin D 200 mg (CITRACAL+D) 315-200 MG-UNIT per tablet  Active   Calcium Citrate-Vitamin D (CALCIUM CITRATE + D) 315-250 MG-UNIT Tab  Active    CITRACAL MAXIMUM 315-250 MG-UNIT Tab Taking Active   cyclobenzaprine (FLEXERIL) 10 MG Tab Taking Active   ferrous sulfate (FEOSOL) 220 (44 FE) MG/5ML Elixir Taking Active   Ferrous Sulfate Powder Taking Active   fluticasone (FLONASE) 50 MCG/ACT nasal spray  Active   FREESTYLE LITE strip  Active   furosemide (LASIX) 40 MG Tab  Active   furosemide (LASIX) 40 MG Tab  Active   lactulose 10 GM/15ML Solution  Active   levothyroxine (SYNTHROID) 137 MCG Tab  Active   Linaclotide (LINZESS) 290 MCG Cap  Active   methylnaltrexone (RELISTOR) 12 MG/0.6ML Solution  Active   morphine (MS IR) 15 MG tablet Taking Active   mycophenolate (CELLCEPT) 250 MG Cap Taking Active   Naloxegol Oxalate (MOVANTIK) 25 MG Tab  Active   Naloxegol Oxalate 25 MG Tab Taking Active   NON SPECIFIED Taking Active   non-formulary med Taking Active   omeprazole (PRILOSEC) 40 MG delayed-release capsule  Active   ondansetron (ZOFRAN ODT) 4 MG TABLET DISPERSIBLE Taking Active   oxycodone immediate release (ROXICODONE) 10 MG immediate release tablet  Active   oxycodone immediate release (ROXICODONE) 10 MG immediate release tablet  Active   oxycodone immediate release (ROXICODONE) 10 MG immediate release tablet  Active   polyethylene glycol 3350 (MIRALAX) Powder Taking Active   pravastatin (PRAVACHOL) 20 MG Tab Taking Active   predniSONE (DELTASONE) 1 MG Tab Taking Active   predniSONE (DELTASONE) 5 MG Tab  Active   Probiotic Product (PROBIOTIC DAILY PO) Taking Active   sennosides (SENOKOT) 8.6 MG Tab Taking Active   SENNOSIDES PO Taking Active   sertraline (ZOLOFT) 100 MG Tab  Active   tacrolimus (PROGRAF) 0.5 MG Cap Taking Active   tacrolimus (PROGRAF) 1 MG Cap Taking Active   Tadalafil, PAH, (ADCIRCA) 20 MG Tab Taking Active   tiotropium (SPIRIVA) 18 MCG Cap Taking Active   trazodone (DESYREL) 50 MG Tab  Active   Wheat Dextrin (BENEFIBER) Powder Taking Active                ALLERGIES  Allergies   Allergen Reactions   • Rhubarb Anaphylaxis   • Organic  "Nitrates      Nitroglycerin products should be avoided with the use of PDE-5 inhibitors as the combination can result in severe hypotension.   • Vancomycin Hcl      Causes red man syndrome when infused to fast         PHYSICAL EXAM  VITAL SIGNS: /56   Pulse 87   Temp 36.8 °C (98.2 °F)   Resp 18   Ht 1.727 m (5' 8\")   Wt 94 kg (207 lb 3.7 oz)   LMP 01/03/2000   SpO2 92%   BMI 31.51 kg/m²   Pulse ox interpretation: Hypoxic on room air  Constitutional: Alert and oriented x 3, moderate Distress  HEENT: Atraumatic normocephalic, pupils are equal round reactive to light extraocular movements are intact. The nares is clear, external ears are normal, mouth shows moist mucous membranes  Neck: Supple, no JVD no tracheal deviation  Cardiovascular: Regular rate and rhythm no murmur rub or gallop 2+ pulses peripherally x4  Thorax & Lungs: Tachypnea, decreased air movement in the bases  GI: Tenderness to palpate the mid epigastrium and the left upper quadrant nondistended positive bowel sounds  Skin: Warm dry no acute rash or lesion  Musculoskeletal: Moving all extremities with full range and 5 of 5 strength, no acute  deformity  Neurologic: Cranial nerves III through XII are grossly intact, no sensory deficit, no cerebellar dysfunction   Psychiatric: Appropriate affect for situation at this time      DIAGNOSTIC STUDIES / PROCEDURES  LABS  Results for orders placed or performed during the hospital encounter of 01/20/18   Complete Metabolic Panel (CMP)   Result Value Ref Range    Sodium 139 135 - 145 mmol/L    Potassium 4.1 3.6 - 5.5 mmol/L    Chloride 102 96 - 112 mmol/L    Co2 28 20 - 33 mmol/L    Anion Gap 9.0 0.0 - 11.9    Glucose 118 (H) 65 - 99 mg/dL    Bun 25 (H) 8 - 22 mg/dL    Creatinine 1.24 0.50 - 1.40 mg/dL    Calcium 9.3 8.5 - 10.5 mg/dL    AST(SGOT) 46 (H) 12 - 45 U/L    ALT(SGPT) 18 2 - 50 U/L    Alkaline Phosphatase 31 30 - 99 U/L    Total Bilirubin 0.4 0.1 - 1.5 mg/dL    Albumin 4.2 3.2 - 4.9 g/dL    " Total Protein 6.8 6.0 - 8.2 g/dL    Globulin 2.6 1.9 - 3.5 g/dL    A-G Ratio 1.6 g/dL   Troponin STAT   Result Value Ref Range    Troponin I <0.01 0.00 - 0.04 ng/mL   Lipase   Result Value Ref Range    Lipase 29 11 - 82 U/L   CBC WITH DIFFERENTIAL   Result Value Ref Range    WBC 2.7 (L) 4.8 - 10.8 K/uL    RBC 4.00 (L) 4.20 - 5.40 M/uL    Hemoglobin 11.7 (L) 12.0 - 16.0 g/dL    Hematocrit 35.8 (L) 37.0 - 47.0 %    MCV 89.5 81.4 - 97.8 fL    MCH 29.3 27.0 - 33.0 pg    MCHC 32.7 (L) 33.6 - 35.0 g/dL    RDW 46.2 35.9 - 50.0 fL    Platelet Count 56 (L) 164 - 446 K/uL    MPV 9.4 9.0 - 12.9 fL    Neutrophils-Polys 56.50 44.00 - 72.00 %    Lymphocytes 33.10 22.00 - 41.00 %    Monocytes 2.60 0.00 - 13.40 %    Eosinophils 3.50 0.00 - 6.90 %    Basophils 1.70 0.00 - 1.80 %    Immature Granulocytes 0.40 0.00 - 0.90 %    Nucleated RBC 0.00 /100 WBC    Neutrophils (Absolute) 1.60 (L) 2.00 - 7.15 K/uL    Lymphs (Absolute) 0.89 (L) 1.00 - 4.80 K/uL    Monos (Absolute) 0.07 0.00 - 0.85 K/uL    Eos (Absolute) 0.09 0.00 - 0.51 K/uL    Baso (Absolute) 0.05 0.00 - 0.12 K/uL    Immature Granulocytes (abs) 0.01 0.00 - 0.11 K/uL    NRBC (Absolute) 0.00 K/uL    Anisocytosis 2+     Microcytosis 2+    BTYPE NATRIURETIC PEPTIDE   Result Value Ref Range    B Natriuretic Peptide 31 0 - 100 pg/mL   ESTIMATED GFR   Result Value Ref Range    GFR If  54 (A) >60 mL/min/1.73 m 2    GFR If Non  44 (A) >60 mL/min/1.73 m 2   BLOOD CULTURE,HOLD   Result Value Ref Range    Blood Culture Hold Collected    DIFFERENTIAL MANUAL   Result Value Ref Range    Bands-Stabs 2.60 0.00 - 10.00 %    Manual Diff Status PERFORMED    PERIPHERAL SMEAR REVIEW   Result Value Ref Range    Peripheral Smear Review see below    PLATELET ESTIMATE   Result Value Ref Range    Plt Estimation Decreased    MORPHOLOGY   Result Value Ref Range    RBC Morphology Present     Poikilocytosis 1+     Spherocytes 1+    IMMATURE PLT FRACTION   Result Value Ref  Range    Imm. Plt Fraction 1.7 0.6 - 13.1 K/uL   ACCU-CHEK GLUCOSE   Result Value Ref Range    Glucose - Accu-Ck 132 (H) 65 - 99 mg/dL   EKG (ER)   Result Value Ref Range    Report       [facility] Emergency Dept.    Test Date:  2018  Pt Name:    VICK LI             Department: ER  MRN:        [id number]                      Room:        04  Gender:     Female                       Technician: [id number]  :        1960                   Requested By:ANISHA MCKINNON  Order #:    [id number]                    Reading MD:    Measurements  Intervals                                Axis  Rate:       72                           P:          48  NM:         148                          QRS:        42  QRSD:       78                           T:          60  QT:         420  QTc:        460    Interpretive Statements  SINUS RHYTHM  Compared to ECG 2017 14:11:59  Sinus bradycardia no longer present       *Note: Due to a large number of results and/or encounters for the requested time period, some results have not been displayed. A complete set of results can be found in Results Review.       All labs reviewed by me.    EKG Interpretation  Interpreted by me    Normal sinus rhythm at a rate of 72 no ST elevation or ST depression or T-wave inversion normal axis normal intervals, normal EKG    RADIOLOGY  CT-CTA COMPLETE THORACOABDOMINAL AORTA   Final Result      1.  No thoracic or abdominal aortic aneurysm or dissection.   2.  Findings consistent with pulmonary hypertension.   3.  No CT evidence for pulmonary embolus.   4.  Bilateral lower lobe consolidation, most concerning for pneumonia.   5.  Splenomegaly with splenic cyst again present.   6.  Increased colonic stool.            DX-CHEST-PORTABLE (1 VIEW)   Final Result      Hypoinflation with bibasilar atelectasis and possible superimposed LEFT basilar pneumonia.      DX-CHEST-PORTABLE (1 VIEW)    (Results Pending)     The  "radiologist's interpretation of all radiological studies have been reviewed by me.    COURSE & MEDICAL DECISION MAKING  Pertinent Labs & Imaging studies reviewed. (See chart for details)    1:40 AM - Patient seen and examined at bedside.       Patient given IV fluids based on hypotension    Medical Decision Making: Arrival patient had labs imaging EKG as above. Patient did have progression of her hypoxia requiring nonrebreather she also had development of worsening pain and hypotension at which point the decision was made to pursue CT thoracic aorta. Results as above showed no evidence of aortic abnormality however did show findings consistent with pulmonary hypertension and bilateral lower lobe pneumonia. Blood cultures were obtained given broad-spectrum antibiotics. I discussed case with hospice , who has agreed to admit the patient her help this patient's greatly appreciated admitted in guarded condition.    /56   Pulse 87   Temp 36.8 °C (98.2 °F)   Resp 18   Ht 1.727 m (5' 8\")   Wt 94 kg (207 lb 3.7 oz)   LMP 01/03/2000   SpO2 92%   BMI 31.51 kg/m²     FINAL IMPRESSION  1. Chest pain  2. Abdominal pain next line   3. Constipation next   4. Bilateral lower lobe pneumonia  5. Pulmonary hypertension      This dictation has been created using voice recognition software and/or scribes. The accuracy of the dictation is limited by the abilities of the software and the expertise of the scribes. I expect there may be some errors of grammar and possibly content. I made every attempt to manually correct the errors within my dictation. However, errors related to voice recognition software and/or scribes may still exist and should be interpreted within the appropriate context.            "

## 2018-01-20 NOTE — CARE PLAN
Problem: Respiratory:  Goal: Respiratory status will improve  Collaborating with RT to improve patient respiratory function. Patient currently on 10L oxymask, tolerating well.     Problem: Fluid Volume:  Goal: Will maintain balanced intake and output  Monitoring I/Os, documenting in epic.

## 2018-01-20 NOTE — PROGRESS NOTES
Two RN head to toe skin assessment completed. Pt trunk is red and blanching.  Bilat lower extremities are fragile and bruised.

## 2018-01-20 NOTE — PROCEDURES
Procedure Note    Date: 1/20/2018  Time: 0545    Procedure: Central Venous Line placement  Site: R IJ vein    Indication: Poor peripheral access, critical illness, severe sepsis  Consent: Informed consent obtained from patient or designated decision maker after explaining the benefits/risks of the procedure including but not limited to bleeding, infection, nerve or other deep structure injury, pneumothorax/hemothorax, arrythmia, or death. Patient or surrogate expressed understanding and agreement and signed consent which can be found in the patient's chart.    Procedure: After obtaining consent, a time-out was performed. Appropriate site confirmed with ultrasound and patient positioned, prepped, and draped in sterile fashion. All those present wearing cap and mask and those physically participating remained adhering to sterile fashion with cap, mask, gloves, and gown. 5 mL of local anesthetic injected (1% lidocaine without epinephrine) achieving appropriate comfort level for patient. Vein localized and accessed using continuous ultrasound guidance and a 7 Fr Triple lumen catheter placed using Seldinger technique. Able to aspirate dark, non-pulsatile blood through each lumen and sterile saline flushed easily before capping. Line secured and dressed in sterile fashion. Patient tolerated procedure well without any difficulties and remains in care of bedside nurse. CXR will be performed to confirm appropriate placement for internal jugular or subclavian CVLs.    EBL: minimal  Complications: none  CXR: pending    Jeremy Gonda, MD  Critical Care Medicine

## 2018-01-20 NOTE — CONSULTS
DATE OF SERVICE:  01/20/2018    REQUESTING PHYSICIAN:  Racquel Oleary MD.    REASON FOR CONSULTATION:  Pneumonia and severe sepsis.    HISTORY OF PRESENT ILLNESS:  This is a pleasant 57-year-old female with a   complicated past medical history including sarcoidosis resulting in liver   failure, status post transplant in 2011 with associated microaspiration,   pulmonary arterial hypertension and oxygen dependence on 5 liter per minute   nasal cannula 24 hours a day, followed by [facility] who presented to   the emergency department complaining of 3-4 days of progressive worsening   shortness of breath, productive cough, chills, abdominal pain and   constipation.  She was evaluated in the emergency department and found to have   evidence of sepsis as well as bilateral lower lobe pneumonia and underwent CT   imaging, which did not show pulmonary embolism nor aneurysm.  She was   initially hypotensive, but responsive to IV fluids and is requiring oxygen in   addition to her baseline for acute hypoxemia.  She is being admitted to the   intensive care unit and I am consulted for assistance in her management.    PAST MEDICAL HISTORY:  1.  Sarcoidosis.  2.  Insulin-dependent diabetes mellitus.  3.  Gastroesophageal reflux disease.  4.  Hepatopulmonary syndrome.  5.  Microaspiration.  6.  Liver failure, status post transplant in 2011.  7.  Stage III chronic kidney disease.  8.  Aortic valve regurgitation.  9.  Severe pulmonary arterial hypertension.  10.  Splenomegaly.  11.  Cardiomegaly.  12.  Chronic benzodiazepine and opiate use.  13.  Obstructive sleep apnea, on AutoSV CPAP.    ALLERGIES:  TO RHUBARB AND VANCOMYCIN CAUSES RED MAN SYNDROME.    OUTPATIENT MEDICATIONS:  Include:  1.  Oxycodone.  2.  Relistor.  3.   Movantik.  4.  Lasix.  5.  Xanax.  6.  Calcium citrate.  7.  Lactulose.  8.  Linaclotide.  9.  Dulcolax.  10.  Trazodone.  11.  Aspirin.  12.  Sertraline.  13.  Fluticasone.  14.  Synthroid.  15.   Prilosec.  16.  Prednisone.  17.  Morphine.  18.  Prograf.  19.  Naloxegol.  20.  Ambrisentan.  21.  Pravastatin.  22.  Iron sulfate.  23.  Spiriva.  24.  _____.  25.  Mycophenolate.    PAST SURGICAL HISTORY:  Includes liver transplantation at [facility] in 2011, lung   biopsy, breast biopsy, colonoscopy, bronchoscopy, hemorrhoidectomy,   cholecystectomy, carpal tunnel release, total abdominal hysterectomy,   tonsillectomy, and breast reduction.    SOCIAL HISTORY:  Negative for tobacco, history of alcohol use, but quit in   2009.  No illicit drugs.  She lives in [place].    FAMILY HISTORY:  Remarkable for diabetes, hypertension, hyperlipidemia, and   stroke.    REVIEW OF SYSTEMS:  Please see history of present illness, otherwise negative   review of systems.  No bowel movement x6-7 days after she has been off of   Relistor because of insurance reasons.    PHYSICAL EXAMINATION:  VITAL SIGNS:  Temperature is 36.8 degrees Celsius, heart rate of 78,   respiratory rate of 18, oxygen saturation is 97% on 10 liter facemask, and   blood pressure of 110/56.  GENERAL:  A well-developed, well-nourished female.  HEENT:  Normocephalic and atraumatic.  Pupils are equal, round and reactive to   light without scleral icterus nor conjunctival pallor.  She has dry oral   mucosal membranes without oropharyngeal erythema.  NECK:  Supple.  Trachea is midline.  There is no stridor nor jugular venous   distention.  She has flat neck veins.  CARDIOVASCULAR:  IV/VI systolic murmur, laterally displaced PMI.  Radial   pulses are 2+ and symmetric with brisk capillary refill.  PULMONARY:  Coarse rhonchi, bilateral bases without wheeze or prolonged   expiratory effort.  She is mildly tachypneic, but able to speak in full   sentences and not using accessory muscles.  ABDOMEN:  Soft, nontender and nondistended.  Positive bowel sounds, scars from   previous surgery noted.  GENITOURINARY:  Deferred.  EXTREMITIES:  Mild clubbing.  No cyanosis or edema, no  calf asymmetry or   palpable cord.  NEUROLOGIC:  Alert and oriented x4.  Cranial nerves II-XII are intact.  No   focal deficits.  SKIN:  Warm, pink and dry without lesions or rash.  Brisk capillary refill.    LABORATORY DATA:  CBC with a white count of 3000, hemoglobin of 12, and   platelets of 56,000 with a left shift on the differential including 2.6%   bands.  Chemistry is remarkable for a bicarbonate of 28, BUN of 25, creatinine   of 1.24 with glucose of 118 and calcium of 9.3.  LFTs are normal except for   an AST of 46 and lipase of 29.  Troponin is less than 0.01 with a brain   natriuretic peptide of 31.  Cultures are pending.  Influenza screen is   pending.    IMAGIN.  Chest x-ray showing increasing bilateral lower lobe consolidation, left   greater than right, compared to prior film.  2.  CT of the thoracoabdominal aorta showing no thoracic or abdominal aortic   aneurysm nor dissection, no pulmonary embolism, evidence of pulmonary   hypertension, bilateral lower lobe consolidations, splenomegaly with splenic   cyst and increased colonic stool.  3.  EKG showed normal sinus rhythm with a rate of 72 with normal axis and   intervals.  4.  Echocardiogram from 2017 reviewed showing normal left ventricular   size, thickness, and function with an ejection fraction of 65%, moderate   mitral regurgitation, moderate aortic insufficiency and a right ventricular   systolic pressure of 35 mmHg.    ASSESSMENT:  1.  Severe sepsis from a pulmonary source.  2.  Healthcare associated pneumonia.  3.  Acute-on-chronic hypoxemic respiratory failure.  4.  Leukopenia.  5.  Anemia.  6.  Thrombocytopenia.  7.  Immunosuppressed state.  8.  Liver disease, status post liver transplant.  9.  Sarcoidosis.  10.  Hepatopulmonary syndrome.  11.  Cardiomegaly.  12.  Severe pulmonary arterial hypertension, controlled on medications.  13.  Stage III chronic kidney disease.  14.  Insulin-dependent diabetes.  15.  Chronic pain.  16.   Constipation.    PLAN:  Patient is critically ill at this time and will be monitored in the   intensive care setting.  She will be given additional IV fluid boluses for   borderline hypotension.  I will escalate her antibiotics to linezolid and   Zosyn and continue the azithromycin at this time to cover for   healthcare-associated pneumonia given her immunosuppressed state as well as to   cover for atypical bacteria and influenza screening cultures are pending to   better guide antibiotic therapy.  She will receive ongoing sepsis protocol and   check a lactic acid level.  A central venous catheter will be placed given   her poor venous access and they will monitor CVP should she become hypotensive   again.  She will be kept on DVT and GI prophylaxis per ICU protocol.  We will   continue her on her outpatient immunosuppressants as well as her pulmonary   arterial hypertension medications to prevent rebound.  She will be resumed on   Relistor for her constipation that is induced by chronic narcotics and   continued on her outpatient pain and benzodiazepine regimen to prevent   withdrawal.  Patient is critically ill at this time and I appreciate the   opportunity to assist in her care and will continue to follow along closely   with you.    CRITICAL CARE TIME:  33 minutes.  No time overlap.  Procedures are not   included in this time.    [id number]       ____________________________________     Jeremy Gonda, MD [facility] / [facility]    DD:  01/20/2018 06:28:17  DT:  01/20/2018 07:34:04    D#:  [id number]  Job#:  [id number]

## 2018-01-20 NOTE — ED NOTES
"Roxana Cuba  57 y.o.  Chief Complaint   Patient presents with   • Shortness of Breath     Pt. states increasing sob starting 3-4 days ago. Pt. given 1 albuterol treatment per [facility].   • Abdominal Pain     Pt. states constipation. Pt. has hx of liver transplant. Pt. states \"my spleen hurts.\"     /56   Pulse 87   Temp 36.8 °C (98.2 °F)   Resp 18   Ht 1.727 m (5' 8\")   Wt 94 kg (207 lb 3.7 oz)   LMP 01/03/2000   SpO2 92%   BMI 31.51 kg/m²     "

## 2018-01-21 PROBLEM — D69.6 THROMBOCYTOPENIA (HCC): Status: ACTIVE | Noted: 2018-01-21

## 2018-01-21 LAB
ALBUMIN SERPL BCP-MCNC: 3.1 G/DL (ref 3.2–4.9)
ALBUMIN/GLOB SERPL: 1.7 G/DL
ALP SERPL-CCNC: 25 U/L (ref 30–99)
ALT SERPL-CCNC: 48 U/L (ref 2–50)
ANION GAP SERPL CALC-SCNC: 6 MMOL/L (ref 0–11.9)
AST SERPL-CCNC: 49 U/L (ref 12–45)
BASOPHILS # BLD AUTO: 0.2 % (ref 0–1.8)
BASOPHILS # BLD: 0.01 K/UL (ref 0–0.12)
BILIRUB SERPL-MCNC: 0.6 MG/DL (ref 0.1–1.5)
BUN SERPL-MCNC: 12 MG/DL (ref 8–22)
CALCIUM SERPL-MCNC: 7.9 MG/DL (ref 8.5–10.5)
CHLORIDE SERPL-SCNC: 105 MMOL/L (ref 96–112)
CO2 SERPL-SCNC: 27 MMOL/L (ref 20–33)
CREAT SERPL-MCNC: 0.82 MG/DL (ref 0.5–1.4)
CRP SERPL HS-MCNC: 13.11 MG/DL (ref 0–0.75)
EOSINOPHIL # BLD AUTO: 0.13 K/UL (ref 0–0.51)
EOSINOPHIL NFR BLD: 2.6 % (ref 0–6.9)
ERYTHROCYTE [DISTWIDTH] IN BLOOD BY AUTOMATED COUNT: 45.4 FL (ref 35.9–50)
ERYTHROCYTE [SEDIMENTATION RATE] IN BLOOD BY WESTERGREN METHOD: 24 MM/HOUR (ref 0–30)
GLOBULIN SER CALC-MCNC: 1.8 G/DL (ref 1.9–3.5)
GLUCOSE BLD-MCNC: 123 MG/DL (ref 65–99)
GLUCOSE BLD-MCNC: 125 MG/DL (ref 65–99)
GLUCOSE BLD-MCNC: 153 MG/DL (ref 65–99)
GLUCOSE BLD-MCNC: 196 MG/DL (ref 65–99)
GLUCOSE SERPL-MCNC: 131 MG/DL (ref 65–99)
HCT VFR BLD AUTO: 31.7 % (ref 37–47)
HGB BLD-MCNC: 10.5 G/DL (ref 12–16)
IMM GRANULOCYTES # BLD AUTO: 0.03 K/UL (ref 0–0.11)
IMM GRANULOCYTES NFR BLD AUTO: 0.6 % (ref 0–0.9)
LYMPHOCYTES # BLD AUTO: 0.41 K/UL (ref 1–4.8)
LYMPHOCYTES NFR BLD: 8.2 % (ref 22–41)
MCH RBC QN AUTO: 29.2 PG (ref 27–33)
MCHC RBC AUTO-ENTMCNC: 33.1 G/DL (ref 33.6–35)
MCV RBC AUTO: 88.3 FL (ref 81.4–97.8)
MONOCYTES # BLD AUTO: 0.3 K/UL (ref 0–0.85)
MONOCYTES NFR BLD AUTO: 6 % (ref 0–13.4)
NEUTROPHILS # BLD AUTO: 4.13 K/UL (ref 2–7.15)
NEUTROPHILS NFR BLD: 82.4 % (ref 44–72)
NRBC # BLD AUTO: 0 K/UL
NRBC BLD-RTO: 0 /100 WBC
PLATELET # BLD AUTO: 54 K/UL (ref 164–446)
PMV BLD AUTO: 9.3 FL (ref 9–12.9)
POTASSIUM SERPL-SCNC: 3.7 MMOL/L (ref 3.6–5.5)
PROT SERPL-MCNC: 4.9 G/DL (ref 6–8.2)
RBC # BLD AUTO: 3.59 M/UL (ref 4.2–5.4)
SODIUM SERPL-SCNC: 138 MMOL/L (ref 135–145)
WBC # BLD AUTO: 5 K/UL (ref 4.8–10.8)

## 2018-01-21 PROCEDURE — A9270 NON-COVERED ITEM OR SERVICE: HCPCS | Performed by: HOSPITALIST

## 2018-01-21 PROCEDURE — 85652 RBC SED RATE AUTOMATED: CPT

## 2018-01-21 PROCEDURE — 700102 HCHG RX REV CODE 250 W/ 637 OVERRIDE(OP): Performed by: INTERNAL MEDICINE

## 2018-01-21 PROCEDURE — 82962 GLUCOSE BLOOD TEST: CPT

## 2018-01-21 PROCEDURE — 700111 HCHG RX REV CODE 636 W/ 250 OVERRIDE (IP): Performed by: HOSPITALIST

## 2018-01-21 PROCEDURE — 80053 COMPREHEN METABOLIC PANEL: CPT

## 2018-01-21 PROCEDURE — 700105 HCHG RX REV CODE 258: Performed by: INTERNAL MEDICINE

## 2018-01-21 PROCEDURE — 770022 HCHG ROOM/CARE - ICU (200)

## 2018-01-21 PROCEDURE — 86738 MYCOPLASMA ANTIBODY: CPT

## 2018-01-21 PROCEDURE — 87040 BLOOD CULTURE FOR BACTERIA: CPT

## 2018-01-21 PROCEDURE — 85025 COMPLETE CBC W/AUTO DIFF WBC: CPT

## 2018-01-21 PROCEDURE — 86140 C-REACTIVE PROTEIN: CPT

## 2018-01-21 PROCEDURE — A9270 NON-COVERED ITEM OR SERVICE: HCPCS | Performed by: INTERNAL MEDICINE

## 2018-01-21 PROCEDURE — 700105 HCHG RX REV CODE 258: Performed by: HOSPITALIST

## 2018-01-21 PROCEDURE — 99233 SBSQ HOSP IP/OBS HIGH 50: CPT | Performed by: HOSPITALIST

## 2018-01-21 PROCEDURE — 700111 HCHG RX REV CODE 636 W/ 250 OVERRIDE (IP): Performed by: INTERNAL MEDICINE

## 2018-01-21 PROCEDURE — 700102 HCHG RX REV CODE 250 W/ 637 OVERRIDE(OP): Performed by: HOSPITALIST

## 2018-01-21 RX ORDER — AZITHROMYCIN 250 MG/1
500 TABLET, FILM COATED ORAL DAILY
Status: DISCONTINUED | OUTPATIENT
Start: 2018-01-22 | End: 2018-01-22

## 2018-01-21 RX ORDER — HYDROMORPHONE HYDROCHLORIDE 2 MG/ML
1 INJECTION, SOLUTION INTRAMUSCULAR; INTRAVENOUS; SUBCUTANEOUS
Status: DISCONTINUED | OUTPATIENT
Start: 2018-01-21 | End: 2018-01-23

## 2018-01-21 RX ADMIN — AMBRISENTAN 10 MG: 10 TABLET, FILM COATED ORAL at 08:49

## 2018-01-21 RX ADMIN — OMEPRAZOLE 40 MG: 20 CAPSULE, DELAYED RELEASE ORAL at 08:46

## 2018-01-21 RX ADMIN — SODIUM CHLORIDE: 9 INJECTION, SOLUTION INTRAVENOUS at 08:54

## 2018-01-21 RX ADMIN — AZITHROMYCIN FOR INJECTION INJECTION, POWDER, LYOPHILIZED, FOR SOLUTION 500 MG: 500 INJECTION INTRAVENOUS at 08:50

## 2018-01-21 RX ADMIN — MYCOPHENOLATE MOFETIL 500 MG: 250 CAPSULE ORAL at 21:10

## 2018-01-21 RX ADMIN — SERTRALINE 100 MG: 100 TABLET, FILM COATED ORAL at 21:10

## 2018-01-21 RX ADMIN — INSULIN HUMAN 2 UNITS: 100 INJECTION, SOLUTION PARENTERAL at 11:06

## 2018-01-21 RX ADMIN — TACROLIMUS 1.5 MG: 1 CAPSULE ORAL at 21:10

## 2018-01-21 RX ADMIN — HYDROMORPHONE HYDROCHLORIDE 1 MG: 2 INJECTION INTRAMUSCULAR; INTRAVENOUS; SUBCUTANEOUS at 14:28

## 2018-01-21 RX ADMIN — SODIUM CHLORIDE: 9 INJECTION, SOLUTION INTRAVENOUS at 14:34

## 2018-01-21 RX ADMIN — MYCOPHENOLATE MOFETIL 500 MG: 250 CAPSULE ORAL at 08:52

## 2018-01-21 RX ADMIN — TRAZODONE HYDROCHLORIDE 50 MG: 50 TABLET ORAL at 21:10

## 2018-01-21 RX ADMIN — TADALAFIL 40 MG: 20 TABLET ORAL at 21:00

## 2018-01-21 RX ADMIN — ALPRAZOLAM 1 MG: 1 TABLET ORAL at 19:48

## 2018-01-21 RX ADMIN — ASPIRIN 81 MG: 81 TABLET, COATED ORAL at 08:47

## 2018-01-21 RX ADMIN — PRAVASTATIN SODIUM 20 MG: 20 TABLET ORAL at 08:46

## 2018-01-21 RX ADMIN — STANDARDIZED SENNA CONCENTRATE AND DOCUSATE SODIUM 2 TABLET: 8.6; 5 TABLET, FILM COATED ORAL at 21:10

## 2018-01-21 RX ADMIN — PREDNISONE 7 MG: 1 TABLET ORAL at 08:52

## 2018-01-21 RX ADMIN — LEVOTHYROXINE SODIUM 137 MCG: 137 TABLET ORAL at 06:11

## 2018-01-21 RX ADMIN — OXYCODONE HYDROCHLORIDE 5 MG: 5 TABLET ORAL at 02:53

## 2018-01-21 RX ADMIN — OXYCODONE HYDROCHLORIDE 5 MG: 5 TABLET ORAL at 10:55

## 2018-01-21 RX ADMIN — STANDARDIZED SENNA CONCENTRATE AND DOCUSATE SODIUM 2 TABLET: 8.6; 5 TABLET, FILM COATED ORAL at 08:46

## 2018-01-21 RX ADMIN — ONDANSETRON HYDROCHLORIDE 4 MG: 2 INJECTION, SOLUTION INTRAMUSCULAR; INTRAVENOUS at 08:48

## 2018-01-21 RX ADMIN — HYDROMORPHONE HYDROCHLORIDE 1 MG: 2 INJECTION INTRAMUSCULAR; INTRAVENOUS; SUBCUTANEOUS at 18:47

## 2018-01-21 RX ADMIN — LINEZOLID 600 MG: 600 INJECTION, SOLUTION INTRAVENOUS at 08:51

## 2018-01-21 RX ADMIN — MORPHINE SULFATE 15 MG: 15 TABLET ORAL at 08:47

## 2018-01-21 RX ADMIN — INSULIN HUMAN 2 UNITS: 100 INJECTION, SOLUTION PARENTERAL at 00:07

## 2018-01-21 RX ADMIN — TACROLIMUS 1.5 MG: 1 CAPSULE ORAL at 08:53

## 2018-01-21 RX ADMIN — ONDANSETRON HYDROCHLORIDE 4 MG: 2 INJECTION, SOLUTION INTRAMUSCULAR; INTRAVENOUS at 17:35

## 2018-01-21 RX ADMIN — ONDANSETRON HYDROCHLORIDE 4 MG: 2 INJECTION, SOLUTION INTRAMUSCULAR; INTRAVENOUS at 01:06

## 2018-01-21 ASSESSMENT — ENCOUNTER SYMPTOMS
SHORTNESS OF BREATH: 1
WEAKNESS: 0
FEVER: 0
NAUSEA: 1
CHILLS: 0
ABDOMINAL PAIN: 1
COUGH: 1
DIARRHEA: 0
VOMITING: 1
HEADACHES: 1
WEAKNESS: 1
SPUTUM PRODUCTION: 0

## 2018-01-21 ASSESSMENT — PAIN SCALES - GENERAL
PAINLEVEL_OUTOF10: 6
PAINLEVEL_OUTOF10: 9
PAINLEVEL_OUTOF10: 1
PAINLEVEL_OUTOF10: 8
PAINLEVEL_OUTOF10: 9
PAINLEVEL_OUTOF10: 8
PAINLEVEL_OUTOF10: 8
PAINLEVEL_OUTOF10: 5
PAINLEVEL_OUTOF10: 8
PAINLEVEL_OUTOF10: 7
PAINLEVEL_OUTOF10: 6
PAINLEVEL_OUTOF10: 8
PAINLEVEL_OUTOF10: 3

## 2018-01-21 NOTE — PROGRESS NOTES
"[facility] Hospitalist Progress Note    Date of Service: 2018    Chief Complaint  57 y.o. female admitted 2018 with shortness of breath.    Interval Problem Update  Ms. Cuba has a history of chronic respiratory failure and liver transplant that presented to the ER with progressive shortness of breath over the past 4 days. She is usually on 5 liters nasal cannula oxygen at home and required increased supplementation. She has had a cough with sputum production and a CT chest confirms bilateral consolidations concerning for pneumonia. She has been admitted to the ICU for IV fluids and IV antibiotics.  RN notes one BM last night. 1 liter urine over night. She has been up to commode. She remains on high-flow oxygen. We discussed her need for daily Relistor which she had been on and will get directly from the pharmacy. Her  is at bedside. Ms. uCba c/o pain in the \"spleen\".  She continues to cough.  Consultants/Specialty  Critical Care. I discussed her condition with Dr. Flores on ICU Hot Rounds.  ID. I discussed his condition with Dr. Boston this morning    Disposition  ICU        Review of Systems   Constitutional: Positive for malaise/fatigue. Negative for chills and fever.   Respiratory: Positive for cough and shortness of breath.    Cardiovascular: Positive for leg swelling. Negative for chest pain.   Neurological: Positive for weakness.   All other systems reviewed and are negative.     Physical Exam  Laboratory/Imaging   Hemodynamics  Temp (24hrs), Av.2 °C (98.9 °F), Min:36.6 °C (97.8 °F), Max:37.8 °C (100.1 °F)   Temperature: 37.3 °C (99.1 °F)  Pulse  Av.5  Min: 65  Max: 105 Heart Rate (Monitored): 74  NIBP: 125/63      Respiratory      Respiration: (!) 11, Pulse Oximetry: 91 %, O2 Daily Delivery Respiratory : Silicone Nasal Cannula     Work Of Breathing / Effort: Shallow;Tachypnea;Increased Work of Breathing  RUL Breath Sounds: Diminished, RML Breath Sounds: Diminished, RLL " Breath Sounds: Diminished, MANJINDER Breath Sounds: Diminished, LLL Breath Sounds: Diminished    Fluids    Intake/Output Summary (Last 24 hours) at 01/21/18 0732  Last data filed at 01/21/18 0600   Gross per 24 hour   Intake           3678.4 ml   Output             2450 ml   Net           1228.4 ml       Nutrition  Orders Placed This Encounter   Procedures   • Diet Order     Standing Status:   Standing     Number of Occurrences:   1     Order Specific Question:   Diet:     Answer:   Regular [1]     Physical Exam   Constitutional: She is oriented to person, place, and time. No distress.   HENT:   Head: Normocephalic and atraumatic.   Neck: Neck supple.   Right IJ central line   Cardiovascular: Normal rate and regular rhythm.    No murmur heard.  Abdominal: She exhibits distension.   Mild, diffuse tenderness   Musculoskeletal: She exhibits edema.   Neurological: She is alert and oriented to person, place, and time.   Skin: Skin is warm and dry. She is not diaphoretic. There is pallor.   Psychiatric: She has a normal mood and affect. Her behavior is normal.   Nursing note and vitals reviewed.      Recent Labs      01/20/18   0252  01/21/18   0627   WBC  2.7*  5.0   RBC  4.00*  3.59*   HEMOGLOBIN  11.7*  10.5*   HEMATOCRIT  35.8*  31.7*   MCV  89.5  88.3   MCH  29.3  29.2   MCHC  32.7*  33.1*   RDW  46.2  45.4   PLATELETCT  56*  54*   MPV  9.4  9.3     Recent Labs      01/20/18   0203  01/21/18   0627   SODIUM  139  138   POTASSIUM  4.1  3.7   CHLORIDE  102  105   CO2  28  27   GLUCOSE  118*  131*   BUN  25*  12   CREATININE  1.24  0.82   CALCIUM  9.3  7.9*         Recent Labs      01/20/18   0252   BNPBTYPENAT  31              Assessment/Plan     * Sepsis (CMS-HCC)- (present on admission)   Assessment & Plan    Secondary to pneumonia with admission to the ICU.  She is immunosuppressed due to liver transplantation.   She has evidence of end organ damage with acute on chronic respiratory failure with hypoxia as well as acute  kidney injury.   She has had IV fluid resuscitation.   Broad spectrum IV antibiotics  Infectious disease consultation.         CAP (community acquired pneumonia)- (present on admission)   Assessment & Plan    Treatment as noted  CT chest:  1.  No thoracic or abdominal aortic aneurysm or dissection.  2.  Findings consistent with pulmonary hypertension.  3.  No CT evidence for pulmonary embolus.  4.  Bilateral lower lobe consolidation, most concerning for pneumonia.  5.  Splenomegaly with splenic cyst again present.  6.  Increased colonic stool        IRMA (acute kidney injury) (CMS-HCC)- (present on admission)   Assessment & Plan    IV fluids have been given and Cr has trended down.        Acute on chronic respiratory failure with hypoxia (CMS-HCC)- (present on admission)   Assessment & Plan    Treatment as noted, patient on 5 L of oxygen at baseline for history of pulmonary hypertension and chronic respiratory failure followed by Dr. Gaming of pulmonary medicine.   On admit she required a 10 L nonrebreather mask.  Pulmonology consulted.        Thrombocytopenia (CMS-HCC)- (present on admission)   Assessment & Plan    Chronic condition.  Platelets 54        Drug-induced constipation- (present on admission)   Assessment & Plan    Patient's insurance stopped covering her relistor last month which typically receives her drug induced constipation.   Relistor will be restarted and continue on bowel protocol.   Continue home long-acting narcotics.        IDDM (insulin dependent diabetes mellitus) (CMS-HCC)- (present on admission)   Assessment & Plan    This is chronic, monitor with Accu-Cheks and cover with insulin sliding scale. Hold any home oral hypoglycemics for now.        Hypothyroidism, adult- (present on admission)   Assessment & Plan    Synthroid 137 mcg.        Status post liver transplant Dr. Canada ([facility])- (present on admission)   Assessment & Plan    Patient chronically immunosuppressed, continue home  Prograf and CellCept.            Reviewed items::  Labs reviewed and Medications reviewed  DVT prophylaxis pharmacological::  Contraindicated - High bleeding risk

## 2018-01-21 NOTE — PROGRESS NOTES
Infectious Disease Progress Note    Author: Silviano Murphypaola Date & Time created: 1/21/2018  9:26 AM    Interval History:  Abx day #1 - Zosyn, linezolid, Azithromycin    Previously received ceftriaxone, unasyn    1/19: CT Chest w/o: Previously demonstrated RLL & LLL infiltrates have almost completely cleared.Emphysematous lungs.                                    A 2.9 mm superior segment right lower lobe nodule is probably unchanged.                                    Splenomegaly and 2.7 cm hypodense lesion within the spleen unchanged.  1/20: Blood culture: neg, CXR: Hypoinflation with bibasilar atelectasis and possible superimposed LEFT basilar pneumonia.           CT scan: CTA - No thoracic or abdominal aortic aneurysm or dissection. Findings consistent with pulmonary hypertension.                           No CT evidence for pulmonary embolus.                           Bilateral lower lobe consolidation, most concerning for pneumonia.                           Splenomegaly with splenic cyst again present.                            Increased colonic stool.  1/20: Right IJ placed. MRSA nasal swab negative  1/21: CRP 13.11. Vomiting/Nausea this am. No fevers. Reports spleen hurting.    Labs Reviewed, Medications Reviewed, Radiology Reviewed, Wound Reviewed, Fluids Reviewed and GI Nutrition Reviewed    Review of Systems:  Review of Systems   Constitutional: Positive for malaise/fatigue. Negative for chills and fever.   Respiratory: Positive for cough and shortness of breath. Negative for sputum production.    Cardiovascular: Negative for chest pain.   Gastrointestinal: Positive for abdominal pain, nausea and vomiting. Negative for diarrhea.   Skin: Negative for rash.   Neurological: Positive for headaches. Negative for weakness.       Physical Exam:  Physical Exam   Constitutional: She is oriented to person, place, and time. She appears well-developed and well-nourished.   Ill appearing   Cardiovascular: Normal  rate and regular rhythm.    No murmur heard.  Pulmonary/Chest: Effort normal. No respiratory distress. She has no rales.   Decreased BS at bases BL   Abdominal: Soft. Bowel sounds are normal. There is no tenderness.   Musculoskeletal: Normal range of motion. She exhibits no edema.   Neurological: She is alert and oriented to person, place, and time. No cranial nerve deficit.   Skin: Skin is warm and dry. No rash noted.   Psychiatric: She has a normal mood and affect.       Labs:  Recent Results (from the past 24 hour(s))   INFLUENZA A/B BY PCR    Collection Time: 01/20/18 10:35 AM   Result Value Ref Range    Influenza virus A RNA Negative Negative    Influenza virus B, PCR Negative Negative   ACCU-CHEK GLUCOSE    Collection Time: 01/20/18 12:58 PM   Result Value Ref Range    Glucose - Accu-Ck 145 (H) 65 - 99 mg/dL   S. AUREUS BY PCR, NASAL COMPLETE    Collection Time: 01/20/18  2:54 PM   Result Value Ref Range    Staph aureus by PCR Negative Negative    MRSA by PCR Negative Negative   ACCU-CHEK GLUCOSE    Collection Time: 01/20/18  5:57 PM   Result Value Ref Range    Glucose - Accu-Ck 145 (H) 65 - 99 mg/dL   ACCU-CHEK GLUCOSE    Collection Time: 01/21/18 12:05 AM   Result Value Ref Range    Glucose - Accu-Ck 153 (H) 65 - 99 mg/dL   ACCU-CHEK GLUCOSE    Collection Time: 01/21/18  6:04 AM   Result Value Ref Range    Glucose - Accu-Ck 125 (H) 65 - 99 mg/dL   CBC with Differential    Collection Time: 01/21/18  6:27 AM   Result Value Ref Range    WBC 5.0 4.8 - 10.8 K/uL    RBC 3.59 (L) 4.20 - 5.40 M/uL    Hemoglobin 10.5 (L) 12.0 - 16.0 g/dL    Hematocrit 31.7 (L) 37.0 - 47.0 %    MCV 88.3 81.4 - 97.8 fL    MCH 29.2 27.0 - 33.0 pg    MCHC 33.1 (L) 33.6 - 35.0 g/dL    RDW 45.4 35.9 - 50.0 fL    Platelet Count 54 (L) 164 - 446 K/uL    MPV 9.3 9.0 - 12.9 fL    Neutrophils-Polys 82.40 (H) 44.00 - 72.00 %    Lymphocytes 8.20 (L) 22.00 - 41.00 %    Monocytes 6.00 0.00 - 13.40 %    Eosinophils 2.60 0.00 - 6.90 %    Basophils  0.20 0.00 - 1.80 %    Immature Granulocytes 0.60 0.00 - 0.90 %    Nucleated RBC 0.00 /100 WBC    Neutrophils (Absolute) 4.13 2.00 - 7.15 K/uL    Lymphs (Absolute) 0.41 (L) 1.00 - 4.80 K/uL    Monos (Absolute) 0.30 0.00 - 0.85 K/uL    Eos (Absolute) 0.13 0.00 - 0.51 K/uL    Baso (Absolute) 0.01 0.00 - 0.12 K/uL    Immature Granulocytes (abs) 0.03 0.00 - 0.11 K/uL    NRBC (Absolute) 0.00 K/uL   Comp Metabolic Panel (CMP)    Collection Time: 01/21/18  6:27 AM   Result Value Ref Range    Sodium 138 135 - 145 mmol/L    Potassium 3.7 3.6 - 5.5 mmol/L    Chloride 105 96 - 112 mmol/L    Co2 27 20 - 33 mmol/L    Anion Gap 6.0 0.0 - 11.9    Glucose 131 (H) 65 - 99 mg/dL    Bun 12 8 - 22 mg/dL    Creatinine 0.82 0.50 - 1.40 mg/dL    Calcium 7.9 (L) 8.5 - 10.5 mg/dL    AST(SGOT) 49 (H) 12 - 45 U/L    ALT(SGPT) 48 2 - 50 U/L    Alkaline Phosphatase 25 (L) 30 - 99 U/L    Total Bilirubin 0.6 0.1 - 1.5 mg/dL    Albumin 3.1 (L) 3.2 - 4.9 g/dL    Total Protein 4.9 (L) 6.0 - 8.2 g/dL    Globulin 1.8 (L) 1.9 - 3.5 g/dL    A-G Ratio 1.7 g/dL   CRP QUANTITIVE (NON-CARDIAC)    Collection Time: 01/21/18  6:27 AM   Result Value Ref Range    Stat C-Reactive Protein 13.11 (H) 0.00 - 0.75 mg/dL   ESTIMATED GFR    Collection Time: 01/21/18  6:27 AM   Result Value Ref Range    GFR If African American >60 >60 mL/min/1.73 m 2    GFR If Non African American >60 >60 mL/min/1.73 m 2     Results     Procedure Component Value Units Date/Time    Blood Culture [[id number]] Collected:  01/20/18 0203    Order Status:  Completed Specimen:  Blood from Peripheral Updated:  01/21/18 0747     Significant Indicator NEG     Source BLD     Site PERIPHERAL     Blood Culture --     No Growth    Note: Blood cultures are incubated for 5 days and  are monitored continuously.Positive blood cultures  are called to the RN and reported as soon as  they are identified.      Narrative:       From different peripheral sites, if not done within the last  24 hours (Per Hospital  "Policy: Only change specimen source to  \"Line\" if specified by physician order)    Blood Culture [[id number]] Collected:  01/20/18 0626    Order Status:  Completed Specimen:  Blood from Peripheral Updated:  01/21/18 0747     Significant Indicator NEG     Source BLD     Site PERIPHERAL     Blood Culture --     No Growth    Note: Blood cultures are incubated for 5 days and  are monitored continuously.Positive blood cultures  are called to the RN and reported as soon as  they are identified.      Narrative:       From different peripheral sites, if not done within the last  24 hours (Per Hospital Policy: Only change specimen source to  \"Line\" if specified by physician order)    BLOOD CULTURE [[id number]] Collected:  01/21/18 0312    Order Status:  Completed Updated:  01/21/18 0328    BLOOD CULTURE [[id number]] Collected:  01/21/18 0312    Order Status:  Completed Updated:  01/21/18 0327    BLOOD CULTURE [[id number]]     Order Status:  No result Specimen:  Blood from Peripheral     BLOOD CULTURE [[id number]]     Order Status:  No result Specimen:  Blood from Peripheral     BLOOD CULTURE [[id number]]     Order Status:  No result Specimen:  Blood from Peripheral     BLOOD CULTURE [[id number]]     Order Status:  Canceled Specimen:  Blood from Peripheral     BLOOD CULTURE [[id number]]     Order Status:  Canceled Specimen:  Blood from Peripheral     BLOOD CULTURE [[id number]]     Order Status:  Canceled Specimen:  Blood from Peripheral     S. AUREUS BY PCR, NASAL COMPLETE [[id number]] Collected:  01/20/18 1454    Order Status:  Completed Specimen:  Nasal from Other Updated:  01/20/18 1716     Staph aureus by PCR Negative     MRSA by PCR Negative    Narrative:       Collected By:[id number] NIMO IBRAHIM    RESPIRATORY VIRUS PANEL BY PCR [[id number]] Collected:  01/20/18 1455    Order Status:  Completed Specimen:  Other from Nasopharyngeal Updated:  01/20/18 1459    Narrative:       Collected By:[id number] NIMO IBRAHIM    INFLUENZA A/B BY " PCR [[id number]] Collected:  18 1035    Order Status:  Completed Specimen:  Nasal from Nasopharyngeal Updated:  18 1117     Influenza virus A RNA Negative     Influenza virus B, PCR Negative    Narrative:       Collected By:[id number] NIMO IBRAHIM    BLOOD CULTURE,HOLD [[id number]] Collected:  18 0623    Order Status:  Completed Updated:  18 0746     Blood Culture Hold Collected    Culture Respiratory W/ GRM STN [[id number]]     Order Status:  Completed Specimen:  Respirate from Sputum     BLOOD CULTURE,HOLD [[id number]] Collected:  18 0203    Order Status:  Completed Updated:  18 0304     Blood Culture Hold Collected        Hemodynamics:  Temp (24hrs), Av.2 °C (98.9 °F), Min:36.6 °C (97.8 °F), Max:37.8 °C (100.1 °F)  Temperature: 37.3 °C (99.1 °F)  Pulse  Av.5  Min: 65  Max: 105Heart Rate (Monitored): 74  NIBP: 125/63     PIV Group Left Antecubital 18g Saline Lock (Active)   Line Secured Taped;Transparent 2018  8:00 PM   Site Condition / Description Assessed;Patent;Clean;Dry;Intact 2018  8:00 PM   Dressing Type / Description Transparent;Clean;Dry;Intact 2018  8:00 PM   Dressing Status Observed 2018  8:00 PM   Saline Locked Yes 2018  8:00 PM   Infiltration Grading Used by [facility]  8:00 PM   Phlebitis Scale (Used by [facility]) 0 2018  8:00 PM   Date Primary Tubing Changed 18  5:18 AM   Date Secondary Tubing Changed 18  5:18 AM   NEXT Primary Tubing Change  18  5:18 AM   NEXT Secondary Tubing Change  18  5:18 AM       Central Line Group Right;Internal Jugular Triple Lumen (Active)   Line Secured Sutured in Place;Transparent 2018  8:00 PM   Patency and Function Check Performed at Beginning of Shift 2018  8:00 PM   Line Necessity Assessed Lack of Peripheral Access 2018  8:00 PM   Consider Removal of Femoral Line Not Applicable 2018  8:00 PM   Closed  Tubing Set Up Yes 1/20/2018  8:00 PM   Hand Washing / Gloves Prior to Every Access Yes 1/20/2018  8:00 PM   Next Daily Chlorhexidine Bath Due (Regional ONLY) 01/21/18 1/20/2018  8:00 PM   Port Access  Scrub the Hub Prior to Access;Blood Return  1/20/2018  8:00 PM   Site Condition / Description Assessed;Patent 1/20/2018  8:00 PM   Signs and Symptoms of Infection None Apparent at this Time 1/20/2018  8:00 PM   Dressing Type / Description Transparent;Occlusive;Antimicrobial Patch (BioPatch);Intact;Serosanguinous;Old / Dried Drainage 1/20/2018  8:00 PM   Dressing Status Changed 1/20/2018  8:00 PM   Next Dressing Change  01/27/18 1/20/2018  8:00 PM   Date Primary Tubing Changed 01/20/18 1/20/2018  8:00 PM   Date Secondary Tubing Changed 01/20/18 1/20/2018  8:00 PM   NEXT Primary Tubing Change  01/23/18 1/20/2018  8:00 PM   NEXT Secondary Tubing Change  01/21/18 1/20/2018  8:00 PM   Date IV Connector(s) Changed 01/20/18 1/20/2018  8:00 PM   NEXT IV Connector(s) Change Date 01/23/18 1/20/2018  8:00 PM     Wound:        Fluids:  Intake/Output       01/19/18 0700 - 01/20/18 0659 (Not Admitted) 01/20/18 0700 - 01/21/18 0659 01/21/18 0700 - 01/22/18 0659      [id number] 7149-9391 Total 7353-1107 4356-3987 Total 3504-6299 5868-6857 Total       Intake    P.O.  --  -- --  480  170 650  --  -- --    P.O. -- -- -- 480 170 650 -- -- --    I.V.  --  2645 2645  2178.8  849.6 3028.4  --  -- --    IV Piggyback Volume (IV Piggyback) -- -- -- 750 -- 750 -- -- --    IV Volume (NS) --  600 1800 -- -- --    IV Volume (Bolus) -- 2445 2445 -- -- -- -- -- --    IV Volume (zosyn) -- -- -- 228.8 249.6 478.4 -- -- --    Total Intake -- 2645 2645 2658.8 1019.6 3678.4 -- -- --       Output    Urine  --  0 0  1500  950 2450  --  -- --    Number of Times Voided -- 0 x 0 x -- -- -- -- -- --    Void (ml) -- 0 0 8965 242 8438 -- -- --    Stool/Urine  --  0 0  --  -- --  --  -- --    Measurable Stool (ml) -- 0 0 -- -- -- -- -- --    Stool  --   -- --  --  -- --  --  -- --    Number of Times Stooled -- 0 x 0 x -- -- -- -- -- --    Total Output -- 0 0 9277 627 9345 -- -- --       Net I/O     -- 2645 2645 1158.8 69.6 1228.4 -- -- --           GI/Nutrition:  Orders Placed This Encounter   Procedures   • Diet Order     Standing Status:   Standing     Number of Occurrences:   1     Order Specific Question:   Diet:     Answer:   Regular [1]     Medications:  Current Facility-Administered Medications   Medication Last Dose   • ipratropium-albuterol (DUONEB) nebulizer solution 3 mL 3 mL at 01/20/18 0439   • trazodone (DESYREL) tablet 50 mg 50 mg at 01/20/18 2138   • tiotropium (SPIRIVA) 18 MCG inhalation capsule 1 Cap Stopped at 01/21/18 0853   • tacrolimus (PROGRAF) capsule 1.5 mg 1.5 mg at 01/21/18 0853   • sertraline (ZOLOFT) tablet 100 mg 100 mg at 01/20/18 2132   • pravastatin (PRAVACHOL) tablet 20 mg 20 mg at 01/21/18 0846   • omeprazole (PRILOSEC) capsule 40 mg 40 mg at 01/21/18 0846   • mycophenolate (CELLCEPT) capsule 500 mg 500 mg at 01/21/18 0852   • morphine (MS IR) tablet 15 mg 15 mg at 01/21/18 0847   • levothyroxine (SYNTHROID) tablet 137 mcg 137 mcg at 01/21/18 0611   • aspirin EC (ECOTRIN) tablet 81 mg 81 mg at 01/21/18 0847   • senna-docusate (PERICOLACE or SENOKOT S) 8.6-50 MG per tablet 2 Tab 2 Tab at 01/21/18 0846    And   • polyethylene glycol/lytes (MIRALAX) PACKET 1 Packet 1 Packet at 01/20/18 1523    And   • magnesium hydroxide (MILK OF MAGNESIA) suspension 30 mL      And   • bisacodyl (DULCOLAX) suppository 10 mg     • Respiratory Care per Protocol     • NS infusion     • NS (BOLUS) infusion 500 mL     • azithromycin (ZITHROMAX) 500 mg in  mL IVPB 500 mg at 01/21/18 0850   • insulin regular (HUMULIN R) injection 2-9 Units Stopped at 01/21/18 0600    And   • glucose 4 g chewable tablet 16 g      And   • dextrose 50% (D50W) injection 25 mL     • ondansetron (ZOFRAN) syringe/vial injection 4 mg 4 mg at 01/21/18 0848   • ondansetron  (ZOFRAN ODT) dispertab 4 mg     • promethazine (PHENERGAN) tablet 12.5-25 mg     • promethazine (PHENERGAN) suppository 12.5-25 mg     • prochlorperazine (COMPAZINE) injection 5-10 mg     • piperacillin-tazobactam (ZOSYN) 10.125 g in  mL infusion     • ambrisentan (LETAIRIS) TABS 10 mg 10 mg at 01/21/18 0849   • alprazolam (XANAX) tablet 1 mg 1 mg at 01/20/18 2132   • Linaclotide CAPS 1 Cap 1 Cap at 01/21/18 0851   • predniSONE (DELTASONE) tablet 7 mg 7 mg at 01/21/18 0852   • Tadalafil (PAH) TABS 40 mg 40 mg at 01/20/18 2100   • oxycodone immediate-release (ROXICODONE) tablet 5 mg 5 mg at 01/21/18 0253     Medical Decision Making, by Problem:  Active Hospital Problems    Diagnosis   • *Sepsis (CMS-[facility]) [A41.9]   • CAP (community acquired pneumonia) [J18.9]   • IRMA (acute kidney injury) (CMS-[facility]) [N17.9]   • Acute on chronic respiratory failure with hypoxia (CMS-[facility]) [J96.21]   • Hypothyroidism, adult [E03.9]   • Status post liver transplant Dr. Canada ([facility]) [Z94.4]   • Thrombocytopenia (CMS-[facility]) [D69.6]   • Drug-induced constipation [K59.03]   • IDDM (insulin dependent diabetes mellitus) (CMS-[facility]) [E11.9, Z79.4]       Plan:  1.  Severe sepsis, likely secondary to pulmonary source.  2.  Acute Bilateral Lower lobe pneumonia  3.  Acute-on-chronic hypoxic respiratory failure.  4.  Severe pulmonary arterial hypertension.  5.  Liver disease, status post liver transplant -- immunosuppressed.  6.  Sarcoidosis.  7.  Hepatorenal syndrome.  8.  Stage III chronic kidney disease.  9.  Insulin-dependent diabetes mellitus -- controlled.  10.  Constipation.     PLAN:   1. Serially remove abx. MRSA nasal screen neg - Stop linezolid  2. Like stop azithromycin in am tomorrow - pending Mycoplasma. Possibly related to nausea.           - If nausea continues despite titration of pain meds then will stop.  3. Continue zosyn - no sputum to acquire for sample  4. Monitor SIRS  5. Pending viral panel - flu neg so far.  6.  Nausea/vomiting today - probably pain med related  7. Continue pred at current dose    Thank you for allowing me to take part in this patient's care.  Greater than   30 minutes of care time was used during this encounter, over 50% of that time   was in direct patient care, counseling and coordination.     The case was discussed with Dr. Flores, the patient, RN, Dr Real and pharmacy during hot round this am.    Thank you for this consult. We will continue to follow along with you.    Dr Boston

## 2018-01-21 NOTE — CARE PLAN
Problem: Skin Integrity  Goal: Risk for impaired skin integrity will decrease  Outcome: PROGRESSING AS EXPECTED  Skin assessment complete. Appropriate barriers in place. Extra pillows in place floating padding bony prominences. Pt turns self    Intervention: Implement precautions to protect skin integrity in collaboration with the interdisciplinary team   01/20/18 2000 01/21/18 0800 01/21/18 1000   OTHER   Skin Preventative Measures --  Pillows in Use to Float Heels;Pillows in Use for Support / Positioning;Silicone Oxygen Tubing in Use --    Bed Types --  Low Air Loss Mattress --    Friction Interventions --  Draw Sheet / Pad Used for Repositioning --    Moisturizers --  Barrier Wipes;Moisturizer  --    Activity  --  Bed --    Patient Turns / Repositioning --  --  Patient Turns Self from Side to Side   Assistance / Tolerance for Turning/Repositioning --  Assistance of Two or More --    Patient is Receiving Nutrition (oral intake <50%) --  --    Nutrition Consult Ordered --  No, Consult has Already been Placed --    Vitamin Therapy in Use --  No --          Problem: Safety  Goal: Will remain free from falls  Outcome: PROGRESSING AS EXPECTED  Bed in low position with bed alarm on. Call light within reach. Lower bed rails in place. Treaded socks in use. Pt near nurses station.     Intervention: Assess risk factors for falls   01/21/18 0800   OTHER   Fall Risk High Risk to Fall - 2 or more points    Risk for Injury-Any positive answers results in the pt being at high risk for fall related injury Not Applicable   Mobility Status Assessment 1-1 Healthcare Provider Required for Assistance with Ambulation & Transfer   History of fall 0   Pt Calls for Assistance Yes

## 2018-01-21 NOTE — PROGRESS NOTES
Med rec updated and complete.  Allergies reviewed.  Met with pt at bedside and dicussed current medications and last doses taken.  Pt denies antibiotic use in last 30 days.

## 2018-01-21 NOTE — PROGRESS NOTES
Pulmonary Critical Care Progress Note        DOS:  1/21/2018, admit 1/20/2018    Chief Complaint: SOB/cough/abdominal pain    History of Present Illness:   This is a pleasant 57-year-old female with a   complicated past medical history including sarcoidosis resulting in liver   failure, status post transplant in 2011 with associated microaspiration,   pulmonary arterial hypertension and oxygen dependence on 5 liter per minute   nasal cannula 24 hours a day, followed by [facility] who presented to   the emergency department complaining of 3-4 days of progressive worsening   shortness of breath, productive cough, chills, abdominal pain and   constipation.  She was evaluated in the emergency department and found to have   evidence of sepsis as well as bilateral lower lobe pneumonia and underwent CT   imaging, which did not show pulmonary embolism nor aneurysm.  She was   initially hypotensive, but responsive to IV fluids and is requiring oxygen in   addition to her baseline for acute hypoxemia.  She is being admitted to the   intensive care unit and I am consulted for assistance in her management.    Review of Systems   Constitutional: Positive for diaphoresis and malaise/fatigue. Negative for chills and fever.   HENT: Negative for congestion and sore throat.    Eyes: Negative.    Respiratory: Positive for cough, sputum production and shortness of breath. Negative for hemoptysis and wheezing.    Cardiovascular: Negative for chest pain, orthopnea and PND.   Gastrointestinal: Negative for abdominal pain, nausea and vomiting.   Genitourinary: Negative.    Musculoskeletal: Negative.    Neurological: Positive for focal weakness and weakness. Negative for sensory change, speech change and headaches.   Endo/Heme/Allergies: Negative.    Psychiatric/Behavioral: The patient is nervous/anxious and has insomnia.        Interval Events:  24 hour interval history reviewed     A&O x4  SR 70s  Bp 100-130s  Reg diet - poor  PO  BM!  UO adequate  Afebrile    5 lpm NC - baseline  CXR Better L base  Refusing IS  Zosyn/Azith/Linezolid  Cultures all neg so far  Mobilizes  K 3.7    Reviewed at bedside in ICU with ID   updated at bedside with wife    PFSH:  No change.    General:  Nontoxic, appears stated age, anxious,    Respiratory:     Pulse Oximetry: 91 %  HF NC O2  -> NC O2          Exam: unlabored respirations, no intercostal retractions or accessory muscle use    Bilateral coarse rales and rhonchi  ImagingCXR  I have personally reviewed the chest x-ray my impression is  noted above and films are reviewed with Team on rounds    CTA chest 1/20:  1.  No thoracic or abdominal aortic aneurysm or dissection.  2.  Findings consistent with pulmonary hypertension.  3.  No CT evidence for pulmonary embolus.  4.  Bilateral lower lobe consolidation, most concerning for pneumonia.  5.  Splenomegaly with splenic cyst again present.  6.  Increased colonic stool.        Invalid input(s): [id number]    HemoDynamics:  Pulse: 69, Heart Rate (Monitored): 69  NIBP: 122/59       Exam: regular rate and rhythm, no ectopy, no murmur, 1+ edema  Imaging: Available data reviewed   ECHO 1/31/2017  CONCLUSIONS  Normal left ventricular size, thickness, systolic function, and diastolic function.  Left ventricular ejection fraction is visually estimated to be 65%.  The right ventricle was normal in size and function.  Moderate mitral regurgitation.  Moderate aortic insufficiency.  Compared to the images of the prior study done 05/24/2016.-  mitral   regurgitation and aortic regurgitation have increased, previously mild.      Recent Labs      01/20/18   0203  01/20/18   0252   TROPONINI  <0.01   --    BNPBTYPENAT   --   31       Neuro:  GCS  15       Exam: no focal deficits noted mental status intact, anxious  Imaging: Available data reviewed    Fluids:  Intake/Output       01/19/18 0700 - 01/20/18 0659 (Not Admitted) 01/20/18 0700 - 01/21/18 0659  18 07 - 18 0659      [id number] 8205-6838 Total [id number] Total [id number] Total       Intake    P.O.  --  -- --  480  120 600  --  -- --    P.O. -- -- -- 480 120 600 -- -- --    I.V.  --  2645 2645  2178.8  708 2886.8  --  -- --    IV Piggyback Volume (IV Piggyback) -- -- -- 750 -- 750 -- -- --    IV Volume (NS) --  500 1700 -- -- --    IV Volume (Bolus) -- 2445 2445 -- -- -- -- -- --    IV Volume (zosyn) -- -- -- 228.8 208 436.8 -- -- --    Total Intake -- 2645 2645 2658.8 828 3486.8 -- -- --       Output    Urine  --  0 0  1500  950 2450  --  -- --    Number of Times Voided -- 0 x 0 x -- -- -- -- -- --    Void (ml) -- 0 0 7462 879 9132 -- -- --    Stool/Urine  --  0 0  --  -- --  --  -- --    Measurable Stool (ml) -- 0 0 -- -- -- -- -- --    Stool  --  -- --  --  -- --  --  -- --    Number of Times Stooled -- 0 x 0 x -- -- -- -- -- --    Total Output -- 0 0 4839 848 2851 -- -- --       Net I/O     -- 2645 2645 1158.8 -122 1036.8 -- -- --           Recent Labs      18   0203   SODIUM  139   POTASSIUM  4.1   CHLORIDE  102   CO2  28   BUN  25*   CREATININE  1.24   CALCIUM  9.3       GI/Nutrition:  Exam: mildly distended, abdomen is soft and non-tender, normal active bowel sounds, no CVAT  Imaging: Available data reviewed  taking PO  Liver Function  Recent Labs      18   0203   ALTSGPT  18   ASTSGOT  46*   ALKPHOSPHAT  31   TBILIRUBIN  0.4   LIPASE  29   GLUCOSE  118*       Heme:  Recent Labs      18   RBC  4.00*   HEMOGLOBIN  11.7*   HEMATOCRIT  35.8*   PLATELETCT  56*       Infectious Disease:  Temp  Av.1 °C (98.8 °F)  Min: 36.6 °C (97.8 °F)  Max: 37.8 °C (100.1 °F)  Micro: antibiotics reviewed and cultures pending  Recent Labs      18   0203  18   WBC   --   2.7*   NEUTSPOLYS   --   56.50   LYMPHOCYTES   --   33.10   MONOCYTES   --   2.60   EOSINOPHILS   --   3.50   BASOPHILS   --   1.70   ASTSGOT  46*   --    ALTSGPT  18    --    ALKPHOSPHAT  31   --    TBILIRUBIN  0.4   --      Current Facility-Administered Medications   Medication Dose Frequency Provider Last Rate Last Dose   • ipratropium-albuterol (DUONEB) nebulizer solution 3 mL  3 mL Q4H PRN (RT) Terry Bob M.D.   3 mL at 01/20/18 0439   • trazodone (DESYREL) tablet 50 mg  50 mg QHS Racquel Oleary M.D.   50 mg at 01/20/18 2138   • tiotropium (SPIRIVA) 18 MCG inhalation capsule 1 Cap  1 Cap DAILY Racquel Oleary M.D.       • tacrolimus (PROGRAF) capsule 1.5 mg  1.5 mg BID Racquel Oleary M.D.   1.5 mg at 01/20/18 2137   • sertraline (ZOLOFT) tablet 100 mg  100 mg QHS Racquel Oleary M.D.   100 mg at 01/20/18 2132   • pravastatin (PRAVACHOL) tablet 20 mg  20 mg DAILY Racquel Oleary M.D.   20 mg at 01/20/18 1019   • omeprazole (PRILOSEC) capsule 40 mg  40 mg DAILY Racquel Oleary M.D.   40 mg at 01/20/18 1019   • mycophenolate (CELLCEPT) capsule 500 mg  500 mg BID Racquel Oleary M.D.   500 mg at 01/20/18 2138   • morphine (MS IR) tablet 15 mg  15 mg Q4HRS PRN Racquel Oleary M.D.   15 mg at 01/20/18 1754   • levothyroxine (SYNTHROID) tablet 137 mcg  137 mcg AM ES Racquel Oleary M.D.   137 mcg at 01/20/18 0749   • aspirin EC (ECOTRIN) tablet 81 mg  81 mg DAILY Racquel Oleary M.D.   81 mg at 01/20/18 1019   • senna-docusate (PERICOLACE or SENOKOT S) 8.6-50 MG per tablet 2 Tab  2 Tab BID Racquel Oleary M.D.   Stopped at 01/20/18 2100    And   • polyethylene glycol/lytes (MIRALAX) PACKET 1 Packet  1 Packet QDAY PRN Racquel Oleary M.D.   1 Packet at 01/20/18 1523    And   • magnesium hydroxide (MILK OF MAGNESIA) suspension 30 mL  30 mL QDAY PRN Racquel Oleary M.D.        And   • bisacodyl (DULCOLAX) suppository 10 mg  10 mg QDAY PRN Racquel Oleary M.D.       • Respiratory Care per Protocol   Continuous RT Racquel Oleary M.D.       • NS infusion   Continuous Racquel Oleary M.D. 100 mL/hr at 01/20/18 1746     • NS (BOLUS) infusion 500 mL  500 mL Once PRN Racquel Oleary  M.D.       • azithromycin (ZITHROMAX) 500 mg in  mL IVPB  500 mg Q24HRS Racquel Oleary M.D.       • insulin regular (HUMULIN R) injection 2-9 Units  2-9 Units Q6HRS Racquel Oleary M.D.   2 Units at 01/21/18 0007    And   • glucose 4 g chewable tablet 16 g  16 g Q15 MIN PRN Racquel Oleary M.D.        And   • dextrose 50% (D50W) injection 25 mL  25 mL Q15 MIN PRN Racquel Oleary M.D.       • ondansetron (ZOFRAN) syringe/vial injection 4 mg  4 mg Q4HRS PRN aRcquel Oleary M.D.   4 mg at 01/21/18 0106   • ondansetron (ZOFRAN ODT) dispertab 4 mg  4 mg Q4HRS PRN Racquel Oleary M.D.       • promethazine (PHENERGAN) tablet 12.5-25 mg  12.5-25 mg Q4HRS PRN Racquel Oleary M.D.       • promethazine (PHENERGAN) suppository 12.5-25 mg  12.5-25 mg Q4HRS PRN Racquel Oleary M.D.       • prochlorperazine (COMPAZINE) injection 5-10 mg  5-10 mg Q4HRS PRN Racquel Oleary M.D.       • lactulose 10 GM/15ML solution 300 mL  300 mL Q6HRS PRN Racquel Oleary M.D.       • piperacillin-tazobactam (ZOSYN) 10.125 g in  mL infusion   Continuous Abx Jeremy M Gonda, M.D. 20.83 mL/hr at 01/20/18 0722     • Linezolid (ZYVOX) premix 600 mg  600 mg Q12HRS Jeremy M Gonda, M.D.   Stopped at 01/20/18 2237   • ambrisentan (LETAIRIS) TABS 10 mg  10 mg DAILY Jeremy M Gonda, M.D.   10 mg at 01/20/18 1357   • alprazolam (XANAX) tablet 1 mg  1 mg TID PRN Jeremy M Gonda, M.D.   1 mg at 01/20/18 2132   • Linaclotide CAPS 1 Cap  1 Cap DAILY Jeremy M Gonda, M.D.   1 Cap at 01/20/18 1357   • predniSONE (DELTASONE) tablet 7 mg  7 mg DAILY Jeremy M Gonda, M.D.   7 mg at 01/20/18 1019   • Tadalafil (PAH) TABS 40 mg  40 mg QHS Jeremy M Gonda, M.D.   40 mg at 01/20/18 2100   • oxycodone immediate-release (ROXICODONE) tablet 5 mg  5 mg Q6HRS PRN Venancio Flores M.D.   5 mg at 01/21/18 0253     Last reviewed on 1/20/2018  4:56 PM by Colleen Schneider    Quality  Measures:  Core Measures & Quality Metrics    Problems/plan:  Severe sepsis from a  pulmonary source.   S/p fluids   Pressors PRN - no so far   Protocols   Healthcare associated pneumonia.   Zosyn/Linezolid   ID following   Cultures pending  Acute-on-chronic hypoxemic respiratory failure.   RT protocols   HF NC weaned to NC - her baseline! -> 5 lpm NC   IS/Mobilize  Leukopenia - monitor  Anemia/Thrombocytopenia - transfuse PRN  Immunosuppressed state.  Liver disease, status post liver transplant.   Prednisone/Mycophenolate/Tacrolimus  H/o Sarcoidosis.  Hepatopulmonary syndrome.  Cardiomegaly - monitor  Severe pulmonary arterial hypertension, controlled on medications.   Continue home regimen - keep sats up with supplemental O2   Ambrisentan/Tadalafil  Stage III chronic kidney disease   Adequate hydration, avoid nephrotoxins, renal dosing meds   Follow UO/renal function   Renal eval PRN  Insulin-dependent diabetes - glycemic control - SSI  Chronic pain - analgesia/mobilize  Constipation - bowel care protocols  Enteral nutrition  Prophylaxis    Discussed patient condition and risk of morbidity and/or mortality with Family, RN, RT, Pharmacy, Charge nurse / hot rounds, Patient and hospitalist and infectious disease.

## 2018-01-21 NOTE — CARE PLAN
Problem: Skin Integrity  Goal: Risk for impaired skin integrity will decrease    Intervention: Assess risk factors for impaired skin integrity and/or pressure ulcers  RN ensures to turn Pt Q2 hours and use extra pillows for support and positioning.  Waffle pillow/mattress in place if indicated.  RN to ensure Pt is not laying on any lines or other objects that might cause tissue injury.  Mepilex used on sacrum when necessary.      Problem: Infection  Goal: Will remain free from infection    Intervention: Assess signs and symptoms of infection  RN ensures to turn Pt Q2 hours and use extra pillows for support and positioning.  Waffle pillow/mattress in place if indicated.  RN to ensure Pt is not laying on any lines or other objects that might cause tissue injury.  Mepilex used on sacrum when necessary.

## 2018-01-21 NOTE — CARE PLAN
Problem: Oxygenation:  Goal: Maintain adequate oxygenation dependent on patient condition  Outcome: PROGRESSING AS EXPECTED  HHFNC ordered.  Patient also using 10L oxy mask.

## 2018-01-21 NOTE — CONSULTS
DATE OF SERVICE:  01/20/2018    REQUESTING PHYSICIAN:  Josemanuel Real MD    REASON FOR CONSULTATION:  Severe sepsis from a pulmonary source.    CONSULTING PHYSICIAN:  Josemanuel Boston MD    HISTORY OF PRESENT ILLNESS:  The patient is a 57-year-old female known to the   infectious disease service.  She has a past medical history significant for   chronic kidney disease stage III, diabetes, cirrhosis, status post liver   transplant, hypothyroidism, sarcoidosis, and pulmonary hypertension.  She   presented to the emergency room on 01/19/2018 with complaints of cough,   shortness of breath and abdominal discomfort.    This is not something new for the patient.  She has had issues like this in   the past, the most recent one was in October.    On this particular occasion though, the patient was in her normal state of   health and doing fine.  She had a recent checkup in [place] at [facility] where she had her liver transplantation and she was reported to be   stable, but certain test had to be completed to ensure the stability.    She presented to the emergency room with 4 days of productive cough with   increasing frequency and intensity, feeling ill with malaise, weakness,   shortness of breath and dyspnea on exertion.  The patient also felt feeling   warm, but it is unclear if she had fever.  There is no nausea or vomiting   reported.    Upon evaluation in the emergency room, she was found to have criteria meeting   sepsis.  This required significant hydration, thus placing her in a severe   sepsis category.  Evaluation of the CT scan noted bilateral lower lobe   pneumonia and worsening hypoxemia on vitals examination.  She was admitted to   the intensive care unit where she required high flow oxygen.  She was started   initially on ceftriaxone and azithromycin by the hospitalist team.  On arrival   to the intensive care unit, this was changed to linezolid, Zosyn and   azithromycin.  Infectious disease was  subsequently consulted in the a.m.    Upon my interview with the patient, she certainly is not herself.  She appears   to have significant malaise.  Mentally she is stable.  I have seen her   previously where she has had issues with adrenal insufficiency or crisis and   her mental status was not appropriate or coherent.  On this occasion, she   seems to be stable, coherent, ill appearing, toxic appearing, coughing with   associated abdominal bloating and discomfort.    Patient denies any nausea, vomiting, diarrhea, chest pain, abdominal pain,   rash, unilateral lower extremity calf tenderness or unilateral weakness.    PAST MEDICAL HISTORY:  1.  Cirrhosis resulted in liver transplantation.  2.  Sarcoidosis.  3.  Hypertension.  4.  Hypothyroidism.  5.  Chronic kidney disease, stage III.  6.  Monoclonal gammopathy.  7.  Multiple pseudomembranous colitis episodes in 2009, 2013, and 2016.  8.  Pulmonary hypertension.  9.  Reduction mammoplasty in 1989.  10.  Hysterectomy.  11.  Closure of a patent foramen ovale.  12.  Carpal tunnel syndrome with release.  13.  Laparoscopic cholecystectomy.  14.  Ventral herniorrhaphy x3.  15.  Gastric varices banding.  16.  Gastric sleeve.  17.  Diabetes -- controlled.  18.  Fracture of left foot.  19.  Gastroesophageal reflux disease.  20.  Home oxygen at 5 liters.  21.  Vancomycin-resistant enterococcus colonization.  22.  Known history of adrenal insufficiency.    FAMILY HISTORY:  No known early heart attacks, strokes, diabetes, or cancer.    SOCIAL HISTORY:  Patient is .  She is a retired  of a hospital,   nonsmoker, nondrinker since 2009.  No illicit drugs.  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    REVIEW OF SYSTEMS:  Please see the HPI, all other review of systems negative   on 14-point AMA/CMS criteria.    ALLERGIES:  1.  RHUBARB -- ANAPHYLAXIS.  2.  SENSITIVE TO VANCOMYCIN WITH RED MAN SYNDROME.    HOME  MEDICATIONS:  1.  Methylnaltrexone.  2.  Naloxegol.  3.  Lasix.  4.  Alprazolam.  5.  Oxycodone.  6.  Furosemide.  7.  Aspirin.  8.  Zoloft.  9.  Fluticasone.  10.  Levothyroxine.  11.  Omeprazole.  12.  Acidophilus.  13.  Sennosides.  14.  Tacrolimus.  15.  Calcium citrate plus vitamin D.  16.  Ferrous sulfate.  17.  Morphine.  18.  Prednisone.  19.  Wheat dextrin.  20.  Polyethylene glycol.  21.  Ambrisentan.  22.  Pravastatin.  23.  Tadalafil.  24.  Cyclobenzaprine.  25.  Citracal.  26.  Ascorbic acid.  27.  Ondansetron.  28.  Spiriva.  29.  Mycophenolate.    PHYSICAL EXAMINATION:  VITAL SIGNS:  Temperature 37.4, pulse 86, respiratory rate 19, blood pressure   120/50, saturating 94% on 10 liters.  GENERAL:  Patient is lying in bed, ill and toxic appearing.  HEENT:  Pupils are equal and reactive.  Moist mucous membranes.  No   lymphadenopathy.  CARDIOVASCULAR:  Normal rate, regular rhythm.  No obvious murmur.  PULMONARY:  Clear to auscultation with decreased breath sounds in the   bilateral bases.  ABDOMEN:  Distended, soft, bowel sounds normoactive.  No pain on palpation in   all 4 quadrants including the epigastrium.  EXTREMITIES:  No edema.  NEUROLOGIC:  Grossly nonfocal.  PSYCHIATRIC:  Ill-appearing, toxic, but appropriate.    LABORATORY DATA:  WBC 2.7, hemoglobin 11.7, hematocrit 35.8, platelet 56,   bands 2.6.  BNP 31.  Lactic acid 2.3.  Procalcitonin 0.07.  Sodium 139,   potassium 4.1, chloride 102, bicarbonate 28, BUN 25, creatinine 1.24, glucose   118, calcium 9.2, AST 46, ALT 18, alkaline phosphatase 31, total bilirubin   0.4, albumin 4.2, total protein 6.8, globulin 2.6, lipase 29, troponin less   than 0.01.    IMAGING:  CT thorax without contrast, right lower lobe and left lower lobe   infiltrate.  Left lower lobe volume loss and residual subcutaneous segmental   atelectasis.  Hyperexpanded and emphysematous lungs.  A 2.9 mm superior   segment right lower lobe nodule is probably unchanged.   Splenomegaly and 2.7   cm hyperdense lesion within the spleen unchanged.  No pulmonary embolism.    MICROBIOLOGY:  Influenza A and B negative.  Influenza A and B negative.  Blood   culture pending.    ASSESSMENT:  1.  Severe sepsis, likely secondary to pulmonary source.  2.  Community-acquired pneumonia.  3.  Acute-on-chronic hypoxic respiratory failure.  4.  Severe pulmonary arterial hypertension.  5.  Liver disease, status post liver transplant -- immunosuppressed.  6.  Sarcoidosis.  7.  Hepatorenal syndrome.  8.  Stage III chronic kidney disease.  9.  Insulin-dependent diabetes mellitus -- controlled.  10.  Constipation.    PLAN:  At this period of time, patient is toxic appearing and has severe   sepsis from a pulmonary source.  Patient's antibiotics have been expanded by   the pulmonary critical care team to linezolid, Zosyn, and azithromycin given   her immunocompromised state.  This would suggest a good beginning, would like   to titrate these down.  We will check an MRSA nasal swab until she is   colonized.  This will help get rid of at least one of the antibiotics.  We   will check atypical organisms as well to include mycoplasma and see if we can   get rid of the azithromycin.  Likely this is streptococcus, but given its   severity, broad spectrum is likely appropriate at this period of time.  Would   suggest monitoring her systemic inflammatory response syndrome signs and   symptoms, following lactate, would check viral panels to assess if this is a   super bacterial infection on top of a viral syndrome.  We will recheck blood   cultures to ensure no bacteremia and pulmonary titrate oxygen requirements as   well as titrate hemodynamic circulatory volume.  We will continue her   prednisone at the current dose.  I do not feel she needs stress dose steroids.    We will continue to monitor closely in the ICU as she appears critically   ill.  We will continue to follow along with you.    Thank you for allowing  me to take part in this patient's care.  Greater than   70 minutes of care time was used during this encounter, over 50% of that time   was in direct patient care, counseling and coordination.    The case was discussed with Dr. Flores, the patient, the patient's    and the RN.       ____________________________________     Josemanuel MD TAM Horton / SAVAGE    DD:  01/20/2018 18:16:56  DT:  01/20/2018 22:41:31    D#:  [id number]  Job#:  [id number]

## 2018-01-22 LAB
ALBUMIN SERPL BCP-MCNC: 2.8 G/DL (ref 3.2–4.9)
ALBUMIN/GLOB SERPL: 1.3 G/DL
ALP SERPL-CCNC: 23 U/L (ref 30–99)
ALT SERPL-CCNC: 30 U/L (ref 2–50)
ANION GAP SERPL CALC-SCNC: 6 MMOL/L (ref 0–11.9)
AST SERPL-CCNC: 26 U/L (ref 12–45)
BASOPHILS # BLD AUTO: 0.3 % (ref 0–1.8)
BASOPHILS # BLD: 0.01 K/UL (ref 0–0.12)
BILIRUB SERPL-MCNC: 0.5 MG/DL (ref 0.1–1.5)
BUN SERPL-MCNC: 7 MG/DL (ref 8–22)
CALCIUM SERPL-MCNC: 7.7 MG/DL (ref 8.5–10.5)
CHLORIDE SERPL-SCNC: 112 MMOL/L (ref 96–112)
CO2 SERPL-SCNC: 24 MMOL/L (ref 20–33)
CREAT SERPL-MCNC: 0.67 MG/DL (ref 0.5–1.4)
EOSINOPHIL # BLD AUTO: 0.16 K/UL (ref 0–0.51)
EOSINOPHIL NFR BLD: 4.7 % (ref 0–6.9)
ERYTHROCYTE [DISTWIDTH] IN BLOOD BY AUTOMATED COUNT: 45.1 FL (ref 35.9–50)
ERYTHROCYTE [SEDIMENTATION RATE] IN BLOOD BY WESTERGREN METHOD: 21 MM/HOUR (ref 0–30)
GLOBULIN SER CALC-MCNC: 2.2 G/DL (ref 1.9–3.5)
GLUCOSE BLD-MCNC: 104 MG/DL (ref 65–99)
GLUCOSE BLD-MCNC: 117 MG/DL (ref 65–99)
GLUCOSE BLD-MCNC: 135 MG/DL (ref 65–99)
GLUCOSE BLD-MCNC: 99 MG/DL (ref 65–99)
GLUCOSE SERPL-MCNC: 102 MG/DL (ref 65–99)
HCT VFR BLD AUTO: 31.3 % (ref 37–47)
HGB BLD-MCNC: 10.2 G/DL (ref 12–16)
IMM GRANULOCYTES # BLD AUTO: 0.01 K/UL (ref 0–0.11)
IMM GRANULOCYTES NFR BLD AUTO: 0.3 % (ref 0–0.9)
LYMPHOCYTES # BLD AUTO: 0.46 K/UL (ref 1–4.8)
LYMPHOCYTES NFR BLD: 13.5 % (ref 22–41)
MCH RBC QN AUTO: 28.8 PG (ref 27–33)
MCHC RBC AUTO-ENTMCNC: 32.6 G/DL (ref 33.6–35)
MCV RBC AUTO: 88.4 FL (ref 81.4–97.8)
MONOCYTES # BLD AUTO: 0.23 K/UL (ref 0–0.85)
MONOCYTES NFR BLD AUTO: 6.8 % (ref 0–13.4)
NEUTROPHILS # BLD AUTO: 2.53 K/UL (ref 2–7.15)
NEUTROPHILS NFR BLD: 74.4 % (ref 44–72)
NRBC # BLD AUTO: 0 K/UL
NRBC BLD-RTO: 0 /100 WBC
PLATELET # BLD AUTO: 55 K/UL (ref 164–446)
PMV BLD AUTO: 9.9 FL (ref 9–12.9)
POTASSIUM SERPL-SCNC: 3.4 MMOL/L (ref 3.6–5.5)
PROCALCITONIN SERPL-MCNC: 0.34 NG/ML
PROT SERPL-MCNC: 5 G/DL (ref 6–8.2)
RBC # BLD AUTO: 3.54 M/UL (ref 4.2–5.4)
SODIUM SERPL-SCNC: 142 MMOL/L (ref 135–145)
WBC # BLD AUTO: 3.4 K/UL (ref 4.8–10.8)

## 2018-01-22 PROCEDURE — 700111 HCHG RX REV CODE 636 W/ 250 OVERRIDE (IP): Performed by: INTERNAL MEDICINE

## 2018-01-22 PROCEDURE — 700105 HCHG RX REV CODE 258: Performed by: INTERNAL MEDICINE

## 2018-01-22 PROCEDURE — 700102 HCHG RX REV CODE 250 W/ 637 OVERRIDE(OP): Performed by: HOSPITALIST

## 2018-01-22 PROCEDURE — 700102 HCHG RX REV CODE 250 W/ 637 OVERRIDE(OP): Performed by: INTERNAL MEDICINE

## 2018-01-22 PROCEDURE — A9270 NON-COVERED ITEM OR SERVICE: HCPCS | Performed by: HOSPITALIST

## 2018-01-22 PROCEDURE — 85025 COMPLETE CBC W/AUTO DIFF WBC: CPT

## 2018-01-22 PROCEDURE — 82962 GLUCOSE BLOOD TEST: CPT

## 2018-01-22 PROCEDURE — 80053 COMPREHEN METABOLIC PANEL: CPT

## 2018-01-22 PROCEDURE — 84145 PROCALCITONIN (PCT): CPT

## 2018-01-22 PROCEDURE — A9270 NON-COVERED ITEM OR SERVICE: HCPCS | Performed by: INTERNAL MEDICINE

## 2018-01-22 PROCEDURE — 85652 RBC SED RATE AUTOMATED: CPT

## 2018-01-22 PROCEDURE — 700111 HCHG RX REV CODE 636 W/ 250 OVERRIDE (IP): Performed by: HOSPITALIST

## 2018-01-22 PROCEDURE — 99233 SBSQ HOSP IP/OBS HIGH 50: CPT | Performed by: HOSPITALIST

## 2018-01-22 PROCEDURE — 700105 HCHG RX REV CODE 258: Performed by: HOSPITALIST

## 2018-01-22 PROCEDURE — 770022 HCHG ROOM/CARE - ICU (200)

## 2018-01-22 RX ORDER — DIPHENHYDRAMINE HYDROCHLORIDE 50 MG/ML
25-50 INJECTION INTRAMUSCULAR; INTRAVENOUS ONCE
Status: COMPLETED | OUTPATIENT
Start: 2018-01-22 | End: 2018-01-22

## 2018-01-22 RX ORDER — POTASSIUM CHLORIDE 20 MEQ/1
40 TABLET, EXTENDED RELEASE ORAL ONCE
Status: COMPLETED | OUTPATIENT
Start: 2018-01-22 | End: 2018-01-22

## 2018-01-22 RX ORDER — DEXAMETHASONE SODIUM PHOSPHATE 4 MG/ML
10 INJECTION, SOLUTION INTRA-ARTICULAR; INTRALESIONAL; INTRAMUSCULAR; INTRAVENOUS; SOFT TISSUE ONCE
Status: COMPLETED | OUTPATIENT
Start: 2018-01-22 | End: 2018-01-22

## 2018-01-22 RX ORDER — THIAMINE MONONITRATE (VIT B1) 100 MG
100 TABLET ORAL DAILY
Status: DISCONTINUED | OUTPATIENT
Start: 2018-01-22 | End: 2018-01-26 | Stop reason: HOSPADM

## 2018-01-22 RX ADMIN — DEXAMETHASONE SODIUM PHOSPHATE 10 MG: 4 INJECTION, SOLUTION INTRAMUSCULAR; INTRAVENOUS at 18:26

## 2018-01-22 RX ADMIN — PRAVASTATIN SODIUM 20 MG: 20 TABLET ORAL at 09:30

## 2018-01-22 RX ADMIN — PREDNISONE 7 MG: 1 TABLET ORAL at 09:28

## 2018-01-22 RX ADMIN — HYDROMORPHONE HYDROCHLORIDE 1 MG: 2 INJECTION INTRAMUSCULAR; INTRAVENOUS; SUBCUTANEOUS at 05:52

## 2018-01-22 RX ADMIN — METHYLNALTREXONE BROMIDE 12 MG: 12 INJECTION, SOLUTION SUBCUTANEOUS at 06:09

## 2018-01-22 RX ADMIN — PROMETHAZINE HYDROCHLORIDE 25 MG: 25 TABLET ORAL at 13:09

## 2018-01-22 RX ADMIN — HYDROMORPHONE HYDROCHLORIDE 1 MG: 2 INJECTION INTRAMUSCULAR; INTRAVENOUS; SUBCUTANEOUS at 12:00

## 2018-01-22 RX ADMIN — ONDANSETRON HYDROCHLORIDE 4 MG: 2 INJECTION, SOLUTION INTRAMUSCULAR; INTRAVENOUS at 21:17

## 2018-01-22 RX ADMIN — HYDROMORPHONE HYDROCHLORIDE 1 MG: 2 INJECTION INTRAMUSCULAR; INTRAVENOUS; SUBCUTANEOUS at 18:02

## 2018-01-22 RX ADMIN — PROCHLORPERAZINE EDISYLATE 10 MG: 5 INJECTION INTRAMUSCULAR; INTRAVENOUS at 22:05

## 2018-01-22 RX ADMIN — ALPRAZOLAM 1 MG: 1 TABLET ORAL at 16:03

## 2018-01-22 RX ADMIN — PROCHLORPERAZINE EDISYLATE 10 MG: 5 INJECTION INTRAMUSCULAR; INTRAVENOUS at 08:09

## 2018-01-22 RX ADMIN — ENOXAPARIN SODIUM 40 MG: 100 INJECTION SUBCUTANEOUS at 12:04

## 2018-01-22 RX ADMIN — LEVOTHYROXINE SODIUM 137 MCG: 137 TABLET ORAL at 06:09

## 2018-01-22 RX ADMIN — TRAZODONE HYDROCHLORIDE 50 MG: 50 TABLET ORAL at 20:15

## 2018-01-22 RX ADMIN — SODIUM CHLORIDE: 9 INJECTION, SOLUTION INTRAVENOUS at 09:27

## 2018-01-22 RX ADMIN — HYDROMORPHONE HYDROCHLORIDE 1 MG: 2 INJECTION INTRAMUSCULAR; INTRAVENOUS; SUBCUTANEOUS at 14:30

## 2018-01-22 RX ADMIN — HYDROMORPHONE HYDROCHLORIDE 1 MG: 2 INJECTION INTRAMUSCULAR; INTRAVENOUS; SUBCUTANEOUS at 03:35

## 2018-01-22 RX ADMIN — SODIUM CHLORIDE: 9 INJECTION, SOLUTION INTRAVENOUS at 09:20

## 2018-01-22 RX ADMIN — MYCOPHENOLATE MOFETIL 500 MG: 250 CAPSULE ORAL at 20:15

## 2018-01-22 RX ADMIN — ALPRAZOLAM 1 MG: 1 TABLET ORAL at 05:51

## 2018-01-22 RX ADMIN — TADALAFIL 40 MG: 20 TABLET ORAL at 23:30

## 2018-01-22 RX ADMIN — ONDANSETRON HYDROCHLORIDE 4 MG: 2 INJECTION, SOLUTION INTRAMUSCULAR; INTRAVENOUS at 17:14

## 2018-01-22 RX ADMIN — HYDROMORPHONE HYDROCHLORIDE 1 MG: 2 INJECTION INTRAMUSCULAR; INTRAVENOUS; SUBCUTANEOUS at 01:19

## 2018-01-22 RX ADMIN — MYCOPHENOLATE MOFETIL 500 MG: 250 CAPSULE ORAL at 09:33

## 2018-01-22 RX ADMIN — ONDANSETRON HYDROCHLORIDE 4 MG: 2 INJECTION, SOLUTION INTRAMUSCULAR; INTRAVENOUS at 03:51

## 2018-01-22 RX ADMIN — POTASSIUM CHLORIDE 40 MEQ: 1500 TABLET, EXTENDED RELEASE ORAL at 12:04

## 2018-01-22 RX ADMIN — PROCHLORPERAZINE EDISYLATE 10 MG: 5 INJECTION INTRAMUSCULAR; INTRAVENOUS at 14:56

## 2018-01-22 RX ADMIN — SERTRALINE 100 MG: 100 TABLET, FILM COATED ORAL at 20:15

## 2018-01-22 RX ADMIN — TACROLIMUS 1.5 MG: 1 CAPSULE ORAL at 20:15

## 2018-01-22 RX ADMIN — Medication 100 MG: at 09:27

## 2018-01-22 RX ADMIN — HYDROMORPHONE HYDROCHLORIDE 1 MG: 2 INJECTION INTRAMUSCULAR; INTRAVENOUS; SUBCUTANEOUS at 20:13

## 2018-01-22 RX ADMIN — ASPIRIN 81 MG: 81 TABLET, COATED ORAL at 09:30

## 2018-01-22 RX ADMIN — OMEPRAZOLE 40 MG: 20 CAPSULE, DELAYED RELEASE ORAL at 09:30

## 2018-01-22 RX ADMIN — HYDROMORPHONE HYDROCHLORIDE 1 MG: 2 INJECTION INTRAMUSCULAR; INTRAVENOUS; SUBCUTANEOUS at 22:05

## 2018-01-22 RX ADMIN — DIPHENHYDRAMINE HYDROCHLORIDE 25 MG: 50 INJECTION, SOLUTION INTRAMUSCULAR; INTRAVENOUS at 15:23

## 2018-01-22 RX ADMIN — TACROLIMUS 1.5 MG: 1 CAPSULE ORAL at 09:28

## 2018-01-22 RX ADMIN — SODIUM CHLORIDE: 9 INJECTION, SOLUTION INTRAVENOUS at 22:05

## 2018-01-22 RX ADMIN — AMBRISENTAN 10 MG: 10 TABLET, FILM COATED ORAL at 09:29

## 2018-01-22 RX ADMIN — ALPRAZOLAM 1 MG: 1 TABLET ORAL at 23:27

## 2018-01-22 ASSESSMENT — ENCOUNTER SYMPTOMS
HEMOPTYSIS: 0
DIARRHEA: 0
CHILLS: 0
VOMITING: 0
MUSCULOSKELETAL NEGATIVE: 1
NERVOUS/ANXIOUS: 1
SPUTUM PRODUCTION: 0
HEADACHES: 0
SPEECH CHANGE: 0
INSOMNIA: 1
NAUSEA: 0
ABDOMINAL PAIN: 1
WEAKNESS: 0
COUGH: 1
FOCAL WEAKNESS: 1
WHEEZING: 0
SHORTNESS OF BREATH: 1
EYES NEGATIVE: 1
ORTHOPNEA: 0
VOMITING: 1
DIAPHORESIS: 1
SORE THROAT: 0
PND: 0
SPUTUM PRODUCTION: 1
NAUSEA: 1
ABDOMINAL PAIN: 0
FEVER: 0
WEAKNESS: 1
SENSORY CHANGE: 0

## 2018-01-22 ASSESSMENT — PAIN SCALES - GENERAL
PAINLEVEL_OUTOF10: 7
PAINLEVEL_OUTOF10: 1
PAINLEVEL_OUTOF10: 10
PAINLEVEL_OUTOF10: 8
PAINLEVEL_OUTOF10: 10
PAINLEVEL_OUTOF10: 1
PAINLEVEL_OUTOF10: 10
PAINLEVEL_OUTOF10: 3
PAINLEVEL_OUTOF10: 9
PAINLEVEL_OUTOF10: 10
PAINLEVEL_OUTOF10: 9
PAINLEVEL_OUTOF10: 7
PAINLEVEL_OUTOF10: 10
PAINLEVEL_OUTOF10: 10
PAINLEVEL_OUTOF10: 9
PAINLEVEL_OUTOF10: 0
PAINLEVEL_OUTOF10: 8
PAINLEVEL_OUTOF10: 10

## 2018-01-22 NOTE — CARE PLAN
Problem: Pain Management  Goal: Pain level will decrease to patient's comfort goal  Outcome: PROGRESSING AS EXPECTED  Assessed patient's pain via 0-10 scale, medicated per MAR. Also providing repositions, extra pillows, emotional support and therapeutic presence.      Problem: Venous Thromboembolism (VTW)/Deep Vein Thrombosis (DVT) Prevention:  Goal: Patient will participate in Venous Thrombosis (VTE)/Deep Vein Thrombosis (DVT)Prevention Measures  Outcome: PROGRESSING AS EXPECTED  Mechanical interventions in place

## 2018-01-22 NOTE — DIETARY
Nutrition services: Nutrition Support Assessment  Day 2 of admit.  56 yo female with admitting diagnosis: sepsis, CAP.    Current problem list:  1) Sepsis  2) IRMA  3) Drug induced constipation  4) S/P liver transplant 2011    Assessment:  Estimated Nutritional Needs: based on: height 5'8'', weight 81.4 kg, IBW 63.5 kg, BMI 27    Calculation/Equation MSJ x 1.2 = 1739 kcal/day  Calories/day: 1628 - 1954 kcal/d (20-24 kcal/day  Protein/day: 98 - 122 grams (1.2 - 1.5 grams of protein/kg)    Evaluation:   1) Patient refusing all meals in the last several day and with inadequate nutrition.  Reports of nausea and several BM's in the last 24 hours.  She has a regular diet order in place.  Supplements okay per discussion with MD.  2) Per discussion with RN, patient has refused cortrak at this time with is agreeable to supplements. RN encouraging PO.  3) Potassium 3.4. Given inadequacy of nutrition patient may be at risk of refeeding.  Discussed repletion and Thiamine during rounds - Thiamine 100 mg added daily per MAR and potassium being repleted.   4) If patients POC changes and she is agreeable to TF, she would benefit from ~ 50% of estimated nutrition needs via TF nocturnally to promote appetite during the day (Impact Peptide 1.5  - 60 ml/hr x 10 hours nocturnally.  This will provide: 900 kcal, 56 grams of protein and 46 ml free water per day (~50% of estimated nutrition needs))    Patient is at risk for acute illness related malnutrition related to inadequate PO intake in the last 4 days, refusal of meals and refusal of nutrition support.     Recommendations/Plan:  1) Encourage PO intake, supplement will be provided TID and nutrition rep to see patient daily for meal and snack preferences.   2) Fluids per MD  3) Encourage PO and document % eaten at each meal in flowsheet.   4) Monitor for refeeding: Order BMP w/ Mg and Phos x 7 days. Replete K, Phos and Mg prn. Supplement 100 mg Thiamine x 7 days to reduce risk of  refeeding  5) If PO intake remains low, patient would benefit from cortrak with additional nutrition support.     RD monitoring.

## 2018-01-22 NOTE — RESPIRATORY CARE
COPD EDUCATION by COPD CLINICAL EDUCATOR  1/22/2018 at 6:49 AM by Mesha [place]     Patient reviewed by COPD education team. Patient does not qualify for COPD program.

## 2018-01-22 NOTE — CARE PLAN
Problem: Nutritional:  Goal: Achieve adequate nutritional intake  Patient will consume >50-75% of meals  Outcome: NOT MET

## 2018-01-22 NOTE — PROGRESS NOTES
Infectious Disease Progress Note    Author: Silviano Murphypaola Date & Time created: 1/22/2018  10:18 AM    Interval History:  Abx day #2 - Zosyn, Azithromycin    Previously received ceftriaxone, unasyn    1/19: CT Chest w/o: Previously demonstrated RLL & LLL infiltrates have almost completely cleared.Emphysematous lungs.                                    A 2.9 mm superior segment right lower lobe nodule is probably unchanged.                                    Splenomegaly and 2.7 cm hypodense lesion within the spleen unchanged.  1/20: Blood culture: neg, CXR: Hypoinflation with bibasilar atelectasis and possible superimposed LEFT basilar pneumonia.           CT scan: CTA - No thoracic or abdominal aortic aneurysm or dissection. Findings consistent with pulmonary hypertension.                           No CT evidence for pulmonary embolus.                           Bilateral lower lobe consolidation, most concerning for pneumonia.                           Splenomegaly with splenic cyst again present.                            Increased colonic stool.  1/20: Right IJ placed. MRSA nasal swab negative  1/21: CRP 13.11. Vomiting/Nausea this am. No fevers. Reports spleen hurting. Linezolid stopped.  1/22: Continued nausea. No vomiting. Abdominal discomfort remains. Azithromycin stopped. No fevers. Had BMs.    Labs Reviewed, Medications Reviewed, Radiology Reviewed, Wound Reviewed, Fluids Reviewed and GI Nutrition Reviewed    Review of Systems:  Review of Systems   Constitutional: Negative for chills and fever.   Respiratory: Positive for cough. Negative for sputum production.    Cardiovascular: Negative for chest pain.   Gastrointestinal: Positive for abdominal pain and nausea. Negative for diarrhea.   Skin: Negative for rash.   Neurological: Negative for weakness.     Physical Exam:  Physical Exam   Constitutional: She is oriented to person, place, and time. She appears well-developed and well-nourished.    Cardiovascular: Normal rate and regular rhythm.    No murmur heard.  Pulmonary/Chest: Effort normal. No respiratory distress. She has no rales.   Decreased BS at bases BL   Abdominal: Soft. Bowel sounds are normal. There is no tenderness.   Musculoskeletal: Normal range of motion. She exhibits no edema.   Neurological: She is alert and oriented to person, place, and time. No cranial nerve deficit.   Skin: Skin is warm and dry. No rash noted.   Psychiatric: She has a normal mood and affect.       Labs:  Recent Results (from the past 24 hour(s))   ACCU-CHEK GLUCOSE    Collection Time: 01/21/18 11:05 AM   Result Value Ref Range    Glucose - Accu-Ck 196 (H) 65 - 99 mg/dL   ACCU-CHEK GLUCOSE    Collection Time: 01/21/18  5:31 PM   Result Value Ref Range    Glucose - Accu-Ck 123 (H) 65 - 99 mg/dL   ACCU-CHEK GLUCOSE    Collection Time: 01/22/18 12:24 AM   Result Value Ref Range    Glucose - Accu-Ck 99 65 - 99 mg/dL   Procalcitonin    Collection Time: 01/22/18  5:00 AM   Result Value Ref Range    Procalcitonin 0.34 (H) <0.25 ng/mL   CBC with Differential    Collection Time: 01/22/18  5:00 AM   Result Value Ref Range    WBC 3.4 (L) 4.8 - 10.8 K/uL    RBC 3.54 (L) 4.20 - 5.40 M/uL    Hemoglobin 10.2 (L) 12.0 - 16.0 g/dL    Hematocrit 31.3 (L) 37.0 - 47.0 %    MCV 88.4 81.4 - 97.8 fL    MCH 28.8 27.0 - 33.0 pg    MCHC 32.6 (L) 33.6 - 35.0 g/dL    RDW 45.1 35.9 - 50.0 fL    Platelet Count 55 (L) 164 - 446 K/uL    MPV 9.9 9.0 - 12.9 fL    Neutrophils-Polys 74.40 (H) 44.00 - 72.00 %    Lymphocytes 13.50 (L) 22.00 - 41.00 %    Monocytes 6.80 0.00 - 13.40 %    Eosinophils 4.70 0.00 - 6.90 %    Basophils 0.30 0.00 - 1.80 %    Immature Granulocytes 0.30 0.00 - 0.90 %    Nucleated RBC 0.00 /100 WBC    Neutrophils (Absolute) 2.53 2.00 - 7.15 K/uL    Lymphs (Absolute) 0.46 (L) 1.00 - 4.80 K/uL    Monos (Absolute) 0.23 0.00 - 0.85 K/uL    Eos (Absolute) 0.16 0.00 - 0.51 K/uL    Baso (Absolute) 0.01 0.00 - 0.12 K/uL    Immature  Granulocytes (abs) 0.01 0.00 - 0.11 K/uL    NRBC (Absolute) 0.00 K/uL   Comp Metabolic Panel (CMP)    Collection Time: 01/22/18  5:00 AM   Result Value Ref Range    Sodium 142 135 - 145 mmol/L    Potassium 3.4 (L) 3.6 - 5.5 mmol/L    Chloride 112 96 - 112 mmol/L    Co2 24 20 - 33 mmol/L    Anion Gap 6.0 0.0 - 11.9    Glucose 102 (H) 65 - 99 mg/dL    Bun 7 (L) 8 - 22 mg/dL    Creatinine 0.67 0.50 - 1.40 mg/dL    Calcium 7.7 (L) 8.5 - 10.5 mg/dL    AST(SGOT) 26 12 - 45 U/L    ALT(SGPT) 30 2 - 50 U/L    Alkaline Phosphatase 23 (L) 30 - 99 U/L    Total Bilirubin 0.5 0.1 - 1.5 mg/dL    Albumin 2.8 (L) 3.2 - 4.9 g/dL    Total Protein 5.0 (L) 6.0 - 8.2 g/dL    Globulin 2.2 1.9 - 3.5 g/dL    A-G Ratio 1.3 g/dL   WESTERGREN SED RATE    Collection Time: 01/22/18  5:00 AM   Result Value Ref Range    Sed Rate Westergren 21 0 - 30 mm/hour   ESTIMATED GFR    Collection Time: 01/22/18  5:00 AM   Result Value Ref Range    GFR If African American >60 >60 mL/min/1.73 m 2    GFR If Non African American >60 >60 mL/min/1.73 m 2   ACCU-CHEK GLUCOSE    Collection Time: 01/22/18  5:03 AM   Result Value Ref Range    Glucose - Accu-Ck 104 (H) 65 - 99 mg/dL     Results     Procedure Component Value Units Date/Time    BLOOD CULTURE [[id number]] Collected:  01/21/18 0312    Order Status:  Completed Specimen:  Blood Updated:  01/22/18 0750     Significant Indicator NEG     Source BLD     Site Peripheral     Blood Culture --     No Growth    Note: Blood cultures are incubated for 5 days and  are monitored continuously.Positive blood cultures  are called to the RN and reported as soon as  they are identified.      BLOOD CULTURE [[id number]] Collected:  01/21/18 0312    Order Status:  Completed Specimen:  Blood Updated:  01/22/18 0750     Significant Indicator NEG     Source BLD     Site --     Blood Culture --     No Growth    Note: Blood cultures are incubated for 5 days and  are monitored continuously.Positive blood cultures  are called to the RN  "and reported as soon as  they are identified.      Blood Culture [[id number]] Collected:  01/20/18 0203    Order Status:  Completed Specimen:  Blood from Peripheral Updated:  01/21/18 0747     Significant Indicator NEG     Source BLD     Site PERIPHERAL     Blood Culture --     No Growth    Note: Blood cultures are incubated for 5 days and  are monitored continuously.Positive blood cultures  are called to the RN and reported as soon as  they are identified.      Narrative:       From different peripheral sites, if not done within the last  24 hours (Per Hospital Policy: Only change specimen source to  \"Line\" if specified by physician order)    Blood Culture [[id number]] Collected:  01/20/18 0626    Order Status:  Completed Specimen:  Blood from Peripheral Updated:  01/21/18 0747     Significant Indicator NEG     Source BLD     Site PERIPHERAL     Blood Culture --     No Growth    Note: Blood cultures are incubated for 5 days and  are monitored continuously.Positive blood cultures  are called to the RN and reported as soon as  they are identified.      Narrative:       From different peripheral sites, if not done within the last  24 hours (Per Hospital Policy: Only change specimen source to  \"Line\" if specified by physician order)    BLOOD CULTURE [[id number]]     Order Status:  No result Specimen:  Blood from Peripheral     BLOOD CULTURE [[id number]]     Order Status:  No result Specimen:  Blood from Peripheral     BLOOD CULTURE [[id number]]     Order Status:  No result Specimen:  Blood from Peripheral     BLOOD CULTURE [[id number]]     Order Status:  Canceled Specimen:  Blood from Peripheral     BLOOD CULTURE [[id number]]     Order Status:  Canceled Specimen:  Blood from Peripheral     BLOOD CULTURE [[id number]]     Order Status:  Canceled Specimen:  Blood from Peripheral     S. AUREUS BY PCR, NASAL COMPLETE [[id number]] Collected:  01/20/18 1454    Order Status:  Completed Specimen:  Nasal from Other Updated:  01/20/18 " 1716     Staph aureus by PCR Negative     MRSA by PCR Negative    Narrative:       Collected By:[id number] NIMO IBRAHIM    RESPIRATORY VIRUS PANEL BY PCR [[id number]] Collected:  18 1455    Order Status:  Completed Specimen:  Other from Nasopharyngeal Updated:  18 1459    Narrative:       Collected By:[id number] NIMO IBRAHIM    INFLUENZA A/B BY PCR [[id number]] Collected:  18 1035    Order Status:  Completed Specimen:  Nasal from Nasopharyngeal Updated:  18 1117     Influenza virus A RNA Negative     Influenza virus B, PCR Negative    Narrative:       Collected By:[id number] NIMO IBRAHIM    BLOOD CULTURE,HOLD [[id number]] Collected:  18 0623    Order Status:  Completed Updated:  18 0746     Blood Culture Hold Collected    Culture Respiratory W/ GRM STN [[id number]]     Order Status:  Completed Specimen:  Respirate from Sputum     BLOOD CULTURE,HOLD [[id number]] Collected:  18 0203    Order Status:  Completed Updated:  18 0304     Blood Culture Hold Collected        Hemodynamics:  Temp (24hrs), Av.8 °C (98.2 °F), Min:36.2 °C (97.2 °F), Max:37.1 °C (98.8 °F)  Temperature: 36.3 °C (97.4 °F)  Pulse  Av  Min: 65  Max: 105Heart Rate (Monitored): 68  NIBP: 124/71     PIV Group Left Antecubital 18g Saline Lock (Active)   Line Secured Taped;Transparent 2018  8:00 AM   Site Condition / Description Assessed;Patent;Clean;Dry;Intact 2018  8:00 AM   Dressing Type / Description Transparent;Clean;Dry;Intact 2018  8:00 AM   Dressing Status Observed 2018  8:00 AM   Saline Locked Yes 2018  8:00 AM   Infiltration Grading Used by [facility] 0 2018  8:00 AM   Phlebitis Scale (Used by [facility]) 0 2018  8:00 AM   Date Primary Tubing Changed 18  5:18 AM   Date Secondary Tubing Changed 18  5:18 AM   NEXT Primary Tubing Change  18  5:18 AM   NEXT Secondary Tubing Change  18  5:18 AM        Central Line Group Right;Internal Jugular Triple Lumen (Active)   Line Secured Sutured in Place;Transparent 1/22/2018  8:00 AM   Patency and Function Check Performed at Beginning of Shift 1/22/2018  8:00 AM   Line Necessity Assessed Lack of Peripheral Access 1/22/2018  8:00 AM   Consider Removal of Femoral Line Not Applicable 1/22/2018  8:00 AM   Closed Tubing Set Up Yes 1/22/2018  8:00 AM   Hand Washing / Gloves Prior to Every Access Yes 1/22/2018  8:00 AM   Next Daily Chlorhexidine Bath Due (Regional ONLY) 01/22/18 1/22/2018  8:00 AM   Port Access  Scrub the Hub Prior to Access;Blood Return  1/22/2018  8:00 AM   Site Condition / Description Assessed;Patent;Clean;Dry;Intact 1/22/2018  8:00 AM   Signs and Symptoms of Infection None Apparent at this Time 1/22/2018  8:00 AM   Dressing Type / Description Antimicrobial Patch (BioPatch);Tegaderm Advanced;Transparent;Clean;Dry;Intact 1/22/2018  8:00 AM   Dressing Status Observed 1/22/2018  8:00 AM   Next Dressing Change  01/22/18 1/21/2018  8:00 PM   Date Primary Tubing Changed 01/20/18 1/21/2018  8:00 PM   Date Secondary Tubing Changed 01/20/18 1/21/2018  8:00 PM   NEXT Primary Tubing Change  01/23/18 1/21/2018  8:00 PM   NEXT Secondary Tubing Change  01/22/18 1/21/2018  8:00 PM   Date IV Connector(s) Changed 01/20/18 1/21/2018  8:00 PM   NEXT IV Connector(s) Change Date 01/23/18 1/21/2018  8:00 PM     Fluids:  Intake/Output       01/20/18 0700 - 01/21/18 0659 01/21/18 0700 - 01/22/18 0659 01/22/18 0700 - 01/23/18 0659      9259-3103 4145-9635 Total 1220-9874 2533-3683 Total [id number] 1900-0659 Total       Intake    P.O.  480  170 650  225  -- 225  --  -- --    P.O. 480 170 650 225 -- 225 -- -- --    I.V.  2178.8  849.6 3028.4  1399.6  1569.6 2969.2  261.6  -- 261.6    IV Piggyback Volume (IV Piggyback) 750 -- 750 550 -- 550 -- -- --    IV Volume (NS) 5950 325 3467 600 1200 1800 200 -- 200    IV Volume (zosyn) 228.8 249.6 478.4 249.6 249.6 499.2 41.6 -- 41.6    IV  Volume (NS TKO) -- -- -- -- 120 120 20 -- 20    Total Intake 2658.8 1019.6 3678.4 1624.6 1569.6 3194.2 261.6 -- 261.6       Output    Urine  1500  950 2450  1950  1100 3050  100  -- 100    Number of Times Voided -- -- -- 4 x 2 x 6 x 1 x -- 1 x    Void (ml) 1152 981 0291 1950 1100 3050 100 -- 100    Stool  --  -- --  --  -- --  --  -- --    Number of Times Stooled -- -- -- 3 x -- 3 x -- -- --    Total Output 6241 062 7176 1950 1100 3050 100 -- 100       Net I/O     1158.8 69.6 1228.4 -325.4 469.6 144.2 161.6 -- 161.6        Weight: 81.4 kg (179 lb 7.3 oz)  GI/Nutrition:  Orders Placed This Encounter   Procedures   • Diet Order     Standing Status:   Standing     Number of Occurrences:   1     Order Specific Question:   Diet:     Answer:   Regular [1]     Medications:  Current Facility-Administered Medications   Medication Last Dose   • thiamine (THIAMINE) tablet 100 mg 100 mg at 01/22/18 0927   • HYDROmorphone (DILAUDID) injection 1 mg 1 mg at 01/22/18 0552   • ipratropium-albuterol (DUONEB) nebulizer solution 3 mL 3 mL at 01/20/18 0439   • trazodone (DESYREL) tablet 50 mg 50 mg at 01/21/18 2110   • tiotropium (SPIRIVA) 18 MCG inhalation capsule 1 Cap Stopped at 01/21/18 0853   • tacrolimus (PROGRAF) capsule 1.5 mg 1.5 mg at 01/22/18 0928   • sertraline (ZOLOFT) tablet 100 mg 100 mg at 01/21/18 2110   • pravastatin (PRAVACHOL) tablet 20 mg 20 mg at 01/22/18 0930   • omeprazole (PRILOSEC) capsule 40 mg 40 mg at 01/22/18 0930   • mycophenolate (CELLCEPT) capsule 500 mg 500 mg at 01/22/18 0933   • morphine (MS IR) tablet 15 mg 15 mg at 01/21/18 0847   • levothyroxine (SYNTHROID) tablet 137 mcg 137 mcg at 01/22/18 0609   • aspirin EC (ECOTRIN) tablet 81 mg 81 mg at 01/22/18 0930   • senna-docusate (PERICOLACE or SENOKOT S) 8.6-50 MG per tablet 2 Tab 2 Tab at 01/21/18 2110    And   • polyethylene glycol/lytes (MIRALAX) PACKET 1 Packet 1 Packet at 01/20/18 1523    And   • magnesium hydroxide (MILK OF MAGNESIA) suspension  30 mL      And   • bisacodyl (DULCOLAX) suppository 10 mg     • Respiratory Care per Protocol     • NS infusion     • NS (BOLUS) infusion 500 mL     • insulin regular (HUMULIN R) injection 2-9 Units Stopped at 01/21/18 1800    And   • glucose 4 g chewable tablet 16 g      And   • dextrose 50% (D50W) injection 25 mL     • ondansetron (ZOFRAN) syringe/vial injection 4 mg 4 mg at 01/22/18 0351   • ondansetron (ZOFRAN ODT) dispertab 4 mg     • promethazine (PHENERGAN) tablet 12.5-25 mg     • promethazine (PHENERGAN) suppository 12.5-25 mg     • prochlorperazine (COMPAZINE) injection 5-10 mg 10 mg at 01/22/18 0809   • piperacillin-tazobactam (ZOSYN) 10.125 g in  mL infusion     • ambrisentan (LETAIRIS) TABS 10 mg 10 mg at 01/22/18 0929   • alprazolam (XANAX) tablet 1 mg 1 mg at 01/22/18 0551   • Linaclotide CAPS 1 Cap 1 Cap at 01/22/18 0935   • predniSONE (DELTASONE) tablet 7 mg 7 mg at 01/22/18 0928   • Tadalafil (PAH) TABS 40 mg 40 mg at 01/21/18 2100   • oxycodone immediate-release (ROXICODONE) tablet 5 mg 5 mg at 01/21/18 1055     Medical Decision Making, by Problem:  Active Hospital Problems    Diagnosis   • *Sepsis (CMS-HCC) [A41.9]   • CAP (community acquired pneumonia) [J18.9]   • IRMA (acute kidney injury) (CMS-HCC) [N17.9]   • Acute on chronic respiratory failure with hypoxia (CMS-HCC) [J96.21]   • Hypothyroidism, adult [E03.9]   • Status post liver transplant Dr. [place] ([facility]) [Z94.4]   • Thrombocytopenia (CMS-[facility]) [D69.6]   • Drug-induced constipation [K59.03]   • IDDM (insulin dependent diabetes mellitus) (CMS-HCC) [E11.9, Z79.4]       Plan:  1.  Severe sepsis, likely secondary to pulmonary source.  2.  Acute Bilateral Lower lobe community acquired pneumonia  3.  Acute-on-chronic hypoxic respiratory failure.  4.  Severe pulmonary arterial hypertension.  5.  Liver disease, status post liver transplant -- immunosuppressed.  6.  Sarcoidosis.  7.  Hepatorenal syndrome.  8.  Stage III chronic kidney  disease.  9.  Insulin-dependent diabetes mellitus -- controlled.     PLAN:   1. Serially remove abx. MRSA nasal screen neg - Stop linezolid. Doubt atypical. Stop azithromycin. Possibly a/w nausea.   2. Pending Mycoplasma. Diagnosis with this organisms seems low yield at this moment.  3. Continue zosyn - no sputum to acquire for sample. Likely Strep and can decrease spectrum more tomorrow & watch.  4. Monitor SIRS  5. Pending viral panel - flu neg.  6. Nausea improved today - probably pain med related  7. Continue pred at current dose    Thank you for allowing me to take part in this patient's care.  Greater than  30 minutes of care time was used during this encounter, over 50% of that time was in direct patient care, counseling and coordination.     The case was discussed with Dr. Sheridan, the patient, RN.    Thank you for this consult. We will continue to follow along with you.    Dr Boston

## 2018-01-22 NOTE — CARE PLAN
Problem: Skin Integrity  Goal: Risk for impaired skin integrity will decrease    Intervention: Assess risk factors for impaired skin integrity and/or pressure ulcers  RN ensures to turn Pt Q2 hours and use extra pillows for support and positioning.  Waffle pillow/mattress in place if indicated.  RN to ensure Pt is not laying on any lines or other objects that might cause tissue injury.  Mepilex used on sacrum when necessary.      Problem: Infection  Goal: Will remain free from infection    Intervention: Assess signs and symptoms of infection  RN monitors Pt VS and lab values to observe for S/S of infection.  Hand hygiene implemented before and after Pt care.

## 2018-01-22 NOTE — PROGRESS NOTES
Pulmonary Critical Care Progress Note        DOS:  1/22/2018, admit 1/20/2018    Chief Complaint: SOB/cough/abdominal pain    History of Present Illness:   This is a pleasant 57-year-old female with a complicated past medical history including sarcoidosis resulting in liver failure, status post transplant in 2011 with associated microaspiration, pulmonary arterial hypertension and oxygen dependence on 5 liter per minute nasal cannula 24 hours a day, followed by [facility] who presented to   the emergency department complaining of 3-4 days of progressive worsening shortness of breath, productive cough, chills, abdominal pain and constipation.  She was evaluated in the emergency department and found to have evidence of sepsis as well as bilateral lower lobe pneumonia and underwent CT imaging, which did not show pulmonary embolism nor aneurysm.  She was   initially hypotensive, but responsive to IV fluids and is requiring oxygen in addition to her baseline for acute hypoxemia.  She is being admitted to the intensive care unit and I am consulted for assistance in her management.    Review of Systems   Constitutional: Positive for diaphoresis and malaise/fatigue. Negative for chills and fever.   HENT: Negative for congestion and sore throat.    Eyes: Negative.    Respiratory: Positive for cough, sputum production and shortness of breath. Negative for hemoptysis and wheezing.    Cardiovascular: Negative for chest pain, orthopnea and PND.   Gastrointestinal: Positive for abdominal pain and vomiting.   Genitourinary: Negative.    Musculoskeletal: Negative.    Neurological: Positive for focal weakness and weakness. Negative for sensory change, speech change and headaches.   Endo/Heme/Allergies: Negative.    Psychiatric/Behavioral: The patient is nervous/anxious and has insomnia.        Interval Events:  24 hour interval history reviewed    - no events overnight   - neuro intact   - c/o abdominal pain from the spleen  and received dilaudid   - -140s   - SR 60-70s   - Refusing all meals since admission   - 6 BMs last night   - NS at 100cc/hr   - zosyn   - O2 at 5 lpm NC--her basline    Yesterday's Events:  A&O x4  SR 70s  Bp 100-130s  Reg diet - poor PO  BM!  UO adequate  Afebrile    5 lpm NC - baseline  CXR Better L base  Refusing IS  Zosyn/Azith/Linezolid  Cultures all neg so far  Mobilizes  K 3.7    Reviewed at bedside in ICU with ID   updated at bedside with wife    PFSH:  No change.    General:  Looks miserable due to vomiting and nausea, older than stated age, pleasant  Skin: warm/dry, no rashes    Respiratory:     Pulse Oximetry: 93 %  HF NC O2  -> NC O2          Exam: breathing comfortably, course bases, no wheezing, clear otherwise  Imaging: reviewed with team during rounds    CTA chest 1/20:  1.  No thoracic or abdominal aortic aneurysm or dissection.  2.  Findings consistent with pulmonary hypertension.  3.  No CT evidence for pulmonary embolus.  4.  Bilateral lower lobe consolidation, most concerning for pneumonia.  5.  Splenomegaly with splenic cyst again present.  6.  Increased colonic stool.        Invalid input(s): [id number]    HemoDynamics:  Pulse: 67, Heart Rate (Monitored): 66  NIBP: 121/65       Exam: RRR, no murmurs, trace to 1+ pedal edema, 2+ dpp=, good cap refill  Imaging: Available data reviewed   ECHO 1/31/2017  CONCLUSIONS  Normal left ventricular size, thickness, systolic function, and diastolic function.  Left ventricular ejection fraction is visually estimated to be 65%.  The right ventricle was normal in size and function.  Moderate mitral regurgitation.  Moderate aortic insufficiency.  Compared to the images of the prior study done 05/24/2016.-  mitral   regurgitation and aortic regurgitation have increased, previously mild.      Recent Labs      01/20/18   0203  01/20/18   0252   TROPONINI  <0.01   --    BNPBTYPENAT   --   31       Neuro:  GCS  15       Exam: clear speech,  symmetrical facial expressions, motor/sensory intact  Imaging: Available data reviewed    Fluids:  Intake/Output       18 0700 - 18 0659 18 07 - 18 0659 18 07 - 18 0659      5615-0500 8002-7918 Total 7454-8472 3634-6542 Total 9359-1644 2732-8968 Total       Intake    P.O.  480  170 650  225  -- 225  --  -- --    P.O. 480 170 650 225 -- 225 -- -- --    I.V.  2178.8  849.6 3028.4  1399.6  1046.4 2446  --  -- --    IV Piggyback Volume (IV Piggyback) 750 -- 750 550 -- 550 -- -- --    IV Volume (NS) 3754 214 4377  -- -- --    IV Volume (zosyn) 228.8 249.6 478.4 249.6 166.4 416 -- -- --    IV Volume (NS TKO) -- -- -- -- 80 80 -- -- --    Total Intake 2658.8 1019.6 3678.4 1624.6 1046.4 2671 -- -- --       Output    Urine  1500  950 2450  1950  600 2550  --  -- --    Number of Times Voided -- -- -- 4 x 1 x 5 x -- -- --    Void (ml) 9589 887 5707 8110 925 5818 -- -- --    Stool  --  -- --  --  -- --  --  -- --    Number of Times Stooled -- -- -- 3 x -- 3 x -- -- --    Total Output 4832 181 7469 3202 931 5812 -- -- --       Net I/O     1158.8 69.6 1228.4 -325.4 446.4 121 -- -- --           Recent Labs      18   0203  18   0627   SODIUM  139  138   POTASSIUM  4.1  3.7   CHLORIDE  102  105   CO2  28  27   BUN  25*  12   CREATININE  1.24  0.82   CALCIUM  9.3  7.9*       GI/Nutrition:  Exam: hypoactive bowel sounds, soft, mild distension, no tenderness, no masses  Imaging: Available data reviewed  taking PO  Liver Function  Recent Labs      18   0203  18   0627   ALTSGPT  18  48   ASTSGOT  46*  49*   ALKPHOSPHAT  31  25*   TBILIRUBIN  0.4  0.6   LIPASE  29   --    GLUCOSE  118*  131*       Heme:  Recent Labs      18   0252  18   0627   RBC  4.00*  3.59*   HEMOGLOBIN  11.7*  10.5*   HEMATOCRIT  35.8*  31.7*   PLATELETCT  56*  54*       Infectious Disease:  Temp  Av.9 °C (98.4 °F)  Min: 36.2 °C (97.2 °F)  Max: 37.3 °C (99.1 °F)  Micro:  antibiotics reviewed and cultures pending  Recent Labs      01/20/18   0203  01/20/18   0252  01/21/18   0627   WBC   --   2.7*  5.0   NEUTSPOLYS   --   56.50  82.40*   LYMPHOCYTES   --   33.10  8.20*   MONOCYTES   --   2.60  6.00   EOSINOPHILS   --   3.50  2.60   BASOPHILS   --   1.70  0.20   ASTSGOT  46*   --   49*   ALTSGPT  18   --   48   ALKPHOSPHAT  31   --   25*   TBILIRUBIN  0.4   --   0.6     Current Facility-Administered Medications   Medication Dose Frequency Provider Last Rate Last Dose   • azithromycin (ZITHROMAX) tablet 500 mg  500 mg DAILY Venancio Flores M.D.       • HYDROmorphone (DILAUDID) injection 1 mg  1 mg Q2HRS PRN Josemanuel Real M.D.   1 mg at 01/22/18 0335   • methylnaltrexone (RELISTOR) injection 12 mg  12 mg Once Josemanuel Real M.D.       • ipratropium-albuterol (DUONEB) nebulizer solution 3 mL  3 mL Q4H PRN (RT) Terry Bob M.D.   3 mL at 01/20/18 0439   • trazodone (DESYREL) tablet 50 mg  50 mg QHS Racquel Oleary M.D.   50 mg at 01/21/18 2110   • tiotropium (SPIRIVA) 18 MCG inhalation capsule 1 Cap  1 Cap DAILY Racquel Oleary M.D.   Stopped at 01/21/18 0853   • tacrolimus (PROGRAF) capsule 1.5 mg  1.5 mg BID Racquel Oleary M.D.   1.5 mg at 01/21/18 2110   • sertraline (ZOLOFT) tablet 100 mg  100 mg QHS Racquel Oleary M.D.   100 mg at 01/21/18 2110   • pravastatin (PRAVACHOL) tablet 20 mg  20 mg DAILY Racquel Oleary M.D.   20 mg at 01/21/18 0846   • omeprazole (PRILOSEC) capsule 40 mg  40 mg DAILY Racquel Oleary M.D.   40 mg at 01/21/18 0846   • mycophenolate (CELLCEPT) capsule 500 mg  500 mg BID Racquel Oleary M.D.   500 mg at 01/21/18 2110   • morphine (MS IR) tablet 15 mg  15 mg Q4HRS PRN Racquel Oleary M.D.   15 mg at 01/21/18 0847   • levothyroxine (SYNTHROID) tablet 137 mcg  137 mcg AM ES Racquel Oleary M.D.   137 mcg at 01/21/18 0611   • aspirin EC (ECOTRIN) tablet 81 mg  81 mg DAILY Racquel Oleary M.D.   81 mg at 01/21/18 0847   • senna-docusate (PERICOLACE or  SENOKOT S) 8.6-50 MG per tablet 2 Tab  2 Tab BID Racquel Oleary M.D.   2 Tab at 01/21/18 2110    And   • polyethylene glycol/lytes (MIRALAX) PACKET 1 Packet  1 Packet QDAY PRN Racquel Oleary M.D.   1 Packet at 01/20/18 1523    And   • magnesium hydroxide (MILK OF MAGNESIA) suspension 30 mL  30 mL QDAY PRN Racquel Oleary M.D.        And   • bisacodyl (DULCOLAX) suppository 10 mg  10 mg QDAY PRN Racquel Oleary M.D.       • Respiratory Care per Protocol   Continuous RT Racquel Oleary M.D.       • NS infusion   Continuous Racquel Oleary M.D. 100 mL/hr at 01/21/18 1434     • NS (BOLUS) infusion 500 mL  500 mL Once PRN Racquel Oleary M.D.       • insulin regular (HUMULIN R) injection 2-9 Units  2-9 Units Q6HRS Racquel Oleary M.D.   Stopped at 01/21/18 1800    And   • glucose 4 g chewable tablet 16 g  16 g Q15 MIN PRN Racquel Oleary M.D.        And   • dextrose 50% (D50W) injection 25 mL  25 mL Q15 MIN PRN Racquel Oleary M.D.       • ondansetron (ZOFRAN) syringe/vial injection 4 mg  4 mg Q4HRS PRN Racquel Oleary M.D.   4 mg at 01/22/18 0351   • ondansetron (ZOFRAN ODT) dispertab 4 mg  4 mg Q4HRS PRN Racquel Oleary M.D.       • promethazine (PHENERGAN) tablet 12.5-25 mg  12.5-25 mg Q4HRS PRN Racquel Oleary M.D.       • promethazine (PHENERGAN) suppository 12.5-25 mg  12.5-25 mg Q4HRS PRN Racquel Oleary M.D.       • prochlorperazine (COMPAZINE) injection 5-10 mg  5-10 mg Q4HRS PRN Racquel Oleary M.D.       • piperacillin-tazobactam (ZOSYN) 10.125 g in  mL infusion   Continuous Abx Jeremy M Gonda, M.D. 20.83 mL/hr at 01/21/18 0854     • ambrisentan (LETAIRIS) TABS 10 mg  10 mg DAILY Jeremy M Gonda, M.D.   10 mg at 01/21/18 0849   • alprazolam (XANAX) tablet 1 mg  1 mg TID PRN Jeremy M Gonda, M.D.   1 mg at 01/21/18 1948   • Linaclotide CAPS 1 Cap  1 Cap DAILY Jeremy M Gonda, M.D.   1 Cap at 01/21/18 0851   • predniSONE (DELTASONE) tablet 7 mg  7 mg DAILY Jeremy M Gonda, M.D.   7 mg at 01/21/18 0852   •  Tadalafil (PAH) TABS 40 mg  40 mg QHS Jeremy M Gonda, M.D.   40 mg at 01/21/18 2100   • oxycodone immediate-release (ROXICODONE) tablet 5 mg  5 mg Q6HRS PRN Venancio Flores M.D.   5 mg at 01/21/18 1055     Last reviewed on 1/20/2018  4:56 PM by Analisa Red PhT    Quality  Measures:  Medications reviewed, EKG reviewed, Labs reviewed and Radiology images reviewed  Holbrook catheter: No Holbrook      DVT Prophylaxis: Enoxaparin (Lovenox)  DVT prophylaxis - mechanical: SCDs  Ulcer prophylaxis: Yes          Problems/plan:  Severe sepsis from a pulmonary source.   S/p fluids   Pressors PRN - no so far   Protocols   Healthcare associated pneumonia.   Zosyn/Linezolid   ID following   Cultures pending  Acute-on-chronic hypoxemic respiratory failure.   RT protocols   HF NC weaned to NC - her baseline! -> 5 lpm NC   IS/Mobilize  Leukopenia - monitor  Anemia/Thrombocytopenia - transfuse PRN  Immunosuppressed state.  Liver disease, status post liver transplant.   Prednisone/Mycophenolate/Tacrolimus  H/o Sarcoidosis.  Hepatopulmonary syndrome.  Cardiomegaly - monitor  Severe pulmonary arterial hypertension, controlled on medications.   Continue home regimen - keep sats up with supplemental O2   Ambrisentan/Tadalafil  Stage III chronic kidney disease   Adequate hydration, avoid nephrotoxins, renal dosing meds   Follow UO/renal function   Renal eval PRN  Insulin-dependent diabetes - glycemic control - SSI  Chronic pain - analgesia/mobilize  Constipation - bowel care protocols  Enteral nutrition  Prophylaxis    Discussed patient condition and risk of morbidity and/or mortality with Family, RN, RT, Pharmacy, Charge nurse / hot rounds, Patient and hospitalist and infectious disease.    [id number]     Oral or Nutrition Support Intake

## 2018-01-22 NOTE — PROGRESS NOTES
"[facility]ist Progress Note    Date of Service: 2018    Chief Complaint  57 y.o. female admitted 2018 with shortness of breath.    Interval Problem Update  Ms. [place] has a history of chronic respiratory failure and liver transplant that presented to the ER with progressive shortness of breath over the past 4 days. She is usually on 5 liters nasal cannula oxygen at home and required increased supplementation. She has had a cough with sputum production and a CT chest confirms bilateral consolidations concerning for pneumonia. She has been admitted to the ICU for IV fluids and IV antibiotics.  We discussed her need for daily Relistor which she had been on and will get directly from the pharmacy.  She complains of ongoing abdominal pain in the \"spleen\".  She has been in a sinus rhythm. 5 liters nasal cannula oxygen which is her home regimen. She has effectively not eaten since Thursday. 6 BMs in 24 hours. A cortrak has been discussed and she will try to get a few protein shakes down a day.   Consultants/Specialty  Critical Care. I discussed her condition with Dr. Sheridan on ICU Hot Rounds.  ID.     Disposition  ICU        Review of Systems   Constitutional: Positive for malaise/fatigue. Negative for chills and fever.   Respiratory: Positive for cough and shortness of breath.    Cardiovascular: Positive for leg swelling. Negative for chest pain.   Neurological: Positive for weakness.   All other systems reviewed and are negative.     Physical Exam  Laboratory/Imaging   Hemodynamics  Temp (24hrs), Av.9 °C (98.4 °F), Min:36.2 °C (97.2 °F), Max:37.3 °C (99.1 °F)   Temperature: 37.1 °C (98.7 °F)  Pulse  Av.2  Min: 65  Max: 105 Heart Rate (Monitored): 71  NIBP: 146/80      Respiratory      Respiration: (!) 27, Pulse Oximetry: 94 %, O2 Daily Delivery Respiratory : Silicone Nasal Cannula     Work Of Breathing / Effort: Tachypnea;Shallow  RUL Breath Sounds: Clear, RML Breath Sounds: Diminished, RLL " Breath Sounds: Diminished, MANJINDER Breath Sounds: Clear, LLL Breath Sounds: Diminished    Fluids    Intake/Output Summary (Last 24 hours) at 01/22/18 0741  Last data filed at 01/22/18 0600   Gross per 24 hour   Intake           3194.2 ml   Output             3050 ml   Net            144.2 ml       Nutrition  Orders Placed This Encounter   Procedures   • Diet Order     Standing Status:   Standing     Number of Occurrences:   1     Order Specific Question:   Diet:     Answer:   Regular [1]     Physical Exam   Constitutional: She is oriented to person, place, and time. No distress.   HENT:   Head: Atraumatic.   Neck: Neck supple.   Right IJ central line   Cardiovascular: Normal rate and regular rhythm.    No murmur heard.  Pulmonary/Chest: No respiratory distress. She has rales.   Decreased air movement   Abdominal: She exhibits distension.   Mild, diffuse tenderness  Pannectomy scar   Musculoskeletal: She exhibits edema.   Neurological: She is alert and oriented to person, place, and time.   Skin: Skin is warm and dry. She is not diaphoretic. There is pallor.   Psychiatric: She has a normal mood and affect. Her behavior is normal.   Nursing note and vitals reviewed.      Recent Labs      01/20/18   0252  01/21/18   0627  01/22/18   0500   WBC  2.7*  5.0  3.4*   RBC  4.00*  3.59*  3.54*   HEMOGLOBIN  11.7*  10.5*  10.2*   HEMATOCRIT  35.8*  31.7*  31.3*   MCV  89.5  88.3  88.4   MCH  29.3  29.2  28.8   MCHC  32.7*  33.1*  32.6*   RDW  46.2  45.4  45.1   PLATELETCT  56*  54*  55*   MPV  9.4  9.3  9.9     Recent Labs      01/20/18   0203  01/21/18   0627  01/22/18   0500   SODIUM  139  138  142   POTASSIUM  4.1  3.7  3.4*   CHLORIDE  102  105  112   CO2  28  27  24   GLUCOSE  118*  131*  102*   BUN  25*  12  7*   CREATININE  1.24  0.82  0.67   CALCIUM  9.3  7.9*  7.7*         Recent Labs      01/20/18   0252   BNPBTYPENAT  31              Assessment/Plan     * Sepsis (CMS-HCC)- (present on admission)   Assessment & Plan     Secondary to pneumonia with admission to the ICU.  She is immunosuppressed due to liver transplantation.   She has evidence of end organ damage with acute on chronic respiratory failure with hypoxia as well as acute kidney injury.   She has had IV fluid resuscitation.   Broad spectrum IV antibiotics  Infectious disease consultation by [facility].         CAP (community acquired pneumonia)- (present on admission)   Assessment & Plan    Treatment as noted  Consider aspiration. She has an outpatient barium swallow in 2 weeks.    CT chest:  1.  No thoracic or abdominal aortic aneurysm or dissection.  2.  Findings consistent with pulmonary hypertension.  3.  No CT evidence for pulmonary embolus.  4.  Bilateral lower lobe consolidation, most concerning for pneumonia.  5.  Splenomegaly with splenic cyst again present.  6.  Increased colonic stool        IRMA (acute kidney injury) (CMS-HCC)- (present on admission)   Assessment & Plan    IV fluids have been given and Cr has trended down.        Acute on chronic respiratory failure with hypoxia (CMS-HCC)- (present on admission)   Assessment & Plan    Treatment as noted, patient on 5 L of oxygen at baseline for history of pulmonary hypertension and chronic respiratory failure followed by Dr. Gaming of pulmonary medicine.   On admit she required a 10 L nonrebreather mask.  Pulmonology consulted.        Thrombocytopenia (CMS-HCC)- (present on admission)   Assessment & Plan    Chronic condition.  Platelets 54        Drug-induced constipation- (present on admission)   Assessment & Plan    Patient's insurance stopped covering her relistor last month which typically receives her drug induced constipation.   Relistor will be restarted and continue on bowel protocol.   Continue home long-acting narcotics.        IDDM (insulin dependent diabetes mellitus) (CMS-HCC)- (present on admission)   Assessment & Plan    This is chronic, monitor with Accu-Cheks and cover with insulin  sliding scale. Hold any home oral hypoglycemics for now.        Hypothyroidism, adult- (present on admission)   Assessment & Plan    Synthroid 137 mcg.        Status post liver transplant Dr. [place] ([facility])- (present on admission)   Assessment & Plan    Patient chronically immunosuppressed, continue home Prograf and CellCept.            Reviewed items::  Labs reviewed and Medications reviewed  DVT prophylaxis pharmacological::  Contraindicated - High bleeding risk

## 2018-01-22 NOTE — DIETARY
Nutrition Support: Brief Update  Patient vomited all of her boost supplement this afternoon and per discussion with RN she is now receptive to having a cortrak placed for supplemental nutrition support. Dr. Shreidan okay with starting feeds. As patient was not able to tolerate much of anything and has emesis following PO consumption of boost, will adjust TF recommendation to provide 100% of estimated needs and can modify as PO intake improves.     Calculation/Equation MSJ x 1.2 = 1739 kcal/day  Calories/day: 1628 - 1954 kcal/d (20-24 kcal/day  Protein/day: 98 - 122 grams (1.2 - 1.5 grams of protein/kg)    Plan/Recommend:  1) Place small bowel cortrak for enteral access for best tolerance of feedings.   2) Once cortrak place and verified start TF of Replete with Fiber @ 25 ml/hr and advance per protocol to goal rate of 75 ml/hr.  This will provide: 1800 kcal, 115 grams of protein and 1494 ml free water per day.   3) Fluids per MD  4) Document intake of all meals and supplements as % taken in ADL's to provide interdisciplinary communication across all shifts.  5) Encourage PO intake.     RD to monitor and adjust TF recommendations accordingly.

## 2018-01-23 ENCOUNTER — APPOINTMENT (OUTPATIENT)
Dept: RADIOLOGY | Facility: MEDICAL CENTER | Age: 58
DRG: 871 | End: 2018-01-23
Attending: HOSPITALIST
Payer: MEDICARE

## 2018-01-23 PROBLEM — G89.29 CHRONIC PAIN: Status: ACTIVE | Noted: 2018-01-23

## 2018-01-23 LAB
ALBUMIN SERPL BCP-MCNC: 3.5 G/DL (ref 3.2–4.9)
ALBUMIN/GLOB SERPL: 1.6 G/DL
ALP SERPL-CCNC: 24 U/L (ref 30–99)
ALT SERPL-CCNC: 25 U/L (ref 2–50)
ANION GAP SERPL CALC-SCNC: 11 MMOL/L (ref 0–11.9)
AST SERPL-CCNC: 17 U/L (ref 12–45)
BASOPHILS # BLD AUTO: 0 % (ref 0–1.8)
BASOPHILS # BLD: 0 K/UL (ref 0–0.12)
BILIRUB SERPL-MCNC: 0.5 MG/DL (ref 0.1–1.5)
BUN SERPL-MCNC: 9 MG/DL (ref 8–22)
CALCIUM SERPL-MCNC: 8 MG/DL (ref 8.5–10.5)
CHLORIDE SERPL-SCNC: 113 MMOL/L (ref 96–112)
CO2 SERPL-SCNC: 16 MMOL/L (ref 20–33)
CREAT SERPL-MCNC: 0.76 MG/DL (ref 0.5–1.4)
EOSINOPHIL # BLD AUTO: 0 K/UL (ref 0–0.51)
EOSINOPHIL NFR BLD: 0 % (ref 0–6.9)
ERYTHROCYTE [DISTWIDTH] IN BLOOD BY AUTOMATED COUNT: 43.9 FL (ref 35.9–50)
FLUAV H1 2009 PAND RNA SPEC QL NAA+PROBE: NOT DETECTED
FLUAV H1 RNA SPEC QL NAA+PROBE: NOT DETECTED
FLUAV H3 RNA SPEC QL NAA+PROBE: NOT DETECTED
FLUAV RNA SPEC QL NAA+PROBE: NOT DETECTED
FLUBV RNA SPEC QL NAA+PROBE: NOT DETECTED
GLOBULIN SER CALC-MCNC: 2.2 G/DL (ref 1.9–3.5)
GLUCOSE BLD-MCNC: 137 MG/DL (ref 65–99)
GLUCOSE BLD-MCNC: 145 MG/DL (ref 65–99)
GLUCOSE BLD-MCNC: 163 MG/DL (ref 65–99)
GLUCOSE BLD-MCNC: 166 MG/DL (ref 65–99)
GLUCOSE BLD-MCNC: 234 MG/DL (ref 65–99)
GLUCOSE SERPL-MCNC: 169 MG/DL (ref 65–99)
HADV DNA SPEC QL NAA+PROBE: NOT DETECTED
HADV DNA SPEC QL NAA+PROBE: NOT DETECTED
HCT VFR BLD AUTO: 33.6 % (ref 37–47)
HGB BLD-MCNC: 10.9 G/DL (ref 12–16)
HMPV RNA SPEC QL NAA+PROBE: NOT DETECTED
HPIV1 RNA SPEC QL NAA+PROBE: NOT DETECTED
HPIV2 RNA SPEC QL NAA+PROBE: NOT DETECTED
HPIV3 RNA SPEC QL NAA+PROBE: NOT DETECTED
IMM GRANULOCYTES # BLD AUTO: 0.02 K/UL (ref 0–0.11)
IMM GRANULOCYTES NFR BLD AUTO: 0.6 % (ref 0–0.9)
LYMPHOCYTES # BLD AUTO: 0.3 K/UL (ref 1–4.8)
LYMPHOCYTES NFR BLD: 8.6 % (ref 22–41)
M PNEUMO IGG SER IA-ACNC: 0.77 U/L
M PNEUMO IGM SER IA-ACNC: 0.11 U/L
MAGNESIUM SERPL-MCNC: 1.6 MG/DL (ref 1.5–2.5)
MCH RBC QN AUTO: 28.5 PG (ref 27–33)
MCHC RBC AUTO-ENTMCNC: 32.4 G/DL (ref 33.6–35)
MCV RBC AUTO: 88 FL (ref 81.4–97.8)
MONOCYTES # BLD AUTO: 0.16 K/UL (ref 0–0.85)
MONOCYTES NFR BLD AUTO: 4.6 % (ref 0–13.4)
NEUTROPHILS # BLD AUTO: 3 K/UL (ref 2–7.15)
NEUTROPHILS NFR BLD: 86.2 % (ref 44–72)
NRBC # BLD AUTO: 0 K/UL
NRBC BLD-RTO: 0 /100 WBC
PHOSPHATE SERPL-MCNC: 2 MG/DL (ref 2.5–4.5)
PLATELET # BLD AUTO: 61 K/UL (ref 164–446)
PMV BLD AUTO: 9.6 FL (ref 9–12.9)
POTASSIUM SERPL-SCNC: 3.6 MMOL/L (ref 3.6–5.5)
PROT SERPL-MCNC: 5.7 G/DL (ref 6–8.2)
RBC # BLD AUTO: 3.82 M/UL (ref 4.2–5.4)
RHINOVIRUS RNA SPEC QL NAA+PROBE: NOT DETECTED
RSV A RNA SPEC QL NAA+PROBE: NOT DETECTED
RSV B RNA SPEC QL NAA+PROBE: NOT DETECTED
SODIUM SERPL-SCNC: 140 MMOL/L (ref 135–145)
WBC # BLD AUTO: 3.5 K/UL (ref 4.8–10.8)

## 2018-01-23 PROCEDURE — 700105 HCHG RX REV CODE 258: Performed by: INTERNAL MEDICINE

## 2018-01-23 PROCEDURE — 700111 HCHG RX REV CODE 636 W/ 250 OVERRIDE (IP): Performed by: INTERNAL MEDICINE

## 2018-01-23 PROCEDURE — 700102 HCHG RX REV CODE 250 W/ 637 OVERRIDE(OP): Performed by: HOSPITALIST

## 2018-01-23 PROCEDURE — A9270 NON-COVERED ITEM OR SERVICE: HCPCS | Performed by: HOSPITALIST

## 2018-01-23 PROCEDURE — 99233 SBSQ HOSP IP/OBS HIGH 50: CPT | Performed by: HOSPITALIST

## 2018-01-23 PROCEDURE — 700111 HCHG RX REV CODE 636 W/ 250 OVERRIDE (IP): Performed by: HOSPITALIST

## 2018-01-23 PROCEDURE — A9270 NON-COVERED ITEM OR SERVICE: HCPCS | Performed by: INTERNAL MEDICINE

## 2018-01-23 PROCEDURE — 84100 ASSAY OF PHOSPHORUS: CPT

## 2018-01-23 PROCEDURE — 700105 HCHG RX REV CODE 258: Performed by: HOSPITALIST

## 2018-01-23 PROCEDURE — 700102 HCHG RX REV CODE 250 W/ 637 OVERRIDE(OP): Performed by: INTERNAL MEDICINE

## 2018-01-23 PROCEDURE — 85025 COMPLETE CBC W/AUTO DIFF WBC: CPT

## 2018-01-23 PROCEDURE — 770001 HCHG ROOM/CARE - MED/SURG/GYN PRIV*

## 2018-01-23 PROCEDURE — 82962 GLUCOSE BLOOD TEST: CPT | Mod: 91

## 2018-01-23 PROCEDURE — 83735 ASSAY OF MAGNESIUM: CPT

## 2018-01-23 PROCEDURE — 302136 NUTRITION PUMP: Performed by: HOSPITALIST

## 2018-01-23 PROCEDURE — 80053 COMPREHEN METABOLIC PANEL: CPT

## 2018-01-23 RX ORDER — HYDROMORPHONE HYDROCHLORIDE 2 MG/ML
1 INJECTION, SOLUTION INTRAMUSCULAR; INTRAVENOUS; SUBCUTANEOUS EVERY 4 HOURS PRN
Status: DISCONTINUED | OUTPATIENT
Start: 2018-01-23 | End: 2018-01-23

## 2018-01-23 RX ORDER — HYDROMORPHONE HYDROCHLORIDE 2 MG/ML
1 INJECTION, SOLUTION INTRAMUSCULAR; INTRAVENOUS; SUBCUTANEOUS
Status: DISCONTINUED | OUTPATIENT
Start: 2018-01-23 | End: 2018-01-24

## 2018-01-23 RX ADMIN — INSULIN HUMAN 3 UNITS: 100 INJECTION, SOLUTION PARENTERAL at 15:07

## 2018-01-23 RX ADMIN — AMBRISENTAN 10 MG: 10 TABLET, FILM COATED ORAL at 09:00

## 2018-01-23 RX ADMIN — MYCOPHENOLATE MOFETIL 500 MG: 250 CAPSULE ORAL at 08:43

## 2018-01-23 RX ADMIN — LEVOTHYROXINE SODIUM 137 MCG: 137 TABLET ORAL at 06:09

## 2018-01-23 RX ADMIN — STANDARDIZED SENNA CONCENTRATE AND DOCUSATE SODIUM 2 TABLET: 8.6; 5 TABLET, FILM COATED ORAL at 08:31

## 2018-01-23 RX ADMIN — ONDANSETRON HYDROCHLORIDE 4 MG: 2 INJECTION, SOLUTION INTRAMUSCULAR; INTRAVENOUS at 21:16

## 2018-01-23 RX ADMIN — ALPRAZOLAM 1 MG: 1 TABLET ORAL at 23:22

## 2018-01-23 RX ADMIN — HYDROMORPHONE HYDROCHLORIDE 1 MG: 2 INJECTION INTRAMUSCULAR; INTRAVENOUS; SUBCUTANEOUS at 04:11

## 2018-01-23 RX ADMIN — PROCHLORPERAZINE EDISYLATE 10 MG: 5 INJECTION INTRAMUSCULAR; INTRAVENOUS at 04:08

## 2018-01-23 RX ADMIN — TRAZODONE HYDROCHLORIDE 50 MG: 50 TABLET ORAL at 21:19

## 2018-01-23 RX ADMIN — Medication 100 MG: at 08:31

## 2018-01-23 RX ADMIN — PREDNISONE 7 MG: 1 TABLET ORAL at 08:44

## 2018-01-23 RX ADMIN — HYDROMORPHONE HYDROCHLORIDE 1 MG: 2 INJECTION INTRAMUSCULAR; INTRAVENOUS; SUBCUTANEOUS at 02:08

## 2018-01-23 RX ADMIN — HYDROMORPHONE HYDROCHLORIDE 1 MG: 2 INJECTION INTRAMUSCULAR; INTRAVENOUS; SUBCUTANEOUS at 08:32

## 2018-01-23 RX ADMIN — ONDANSETRON HYDROCHLORIDE 4 MG: 2 INJECTION, SOLUTION INTRAMUSCULAR; INTRAVENOUS at 02:08

## 2018-01-23 RX ADMIN — PROMETHAZINE HYDROCHLORIDE 25 MG: 25 SUPPOSITORY RECTAL at 00:18

## 2018-01-23 RX ADMIN — MYCOPHENOLATE MOFETIL 500 MG: 250 CAPSULE ORAL at 21:20

## 2018-01-23 RX ADMIN — SODIUM CHLORIDE: 9 INJECTION, SOLUTION INTRAVENOUS at 08:56

## 2018-01-23 RX ADMIN — HYDROMORPHONE HYDROCHLORIDE 1 MG: 2 INJECTION INTRAMUSCULAR; INTRAVENOUS; SUBCUTANEOUS at 19:07

## 2018-01-23 RX ADMIN — ALPRAZOLAM 1 MG: 1 TABLET ORAL at 08:48

## 2018-01-23 RX ADMIN — HYDROMORPHONE HYDROCHLORIDE 1 MG: 2 INJECTION INTRAMUSCULAR; INTRAVENOUS; SUBCUTANEOUS at 00:11

## 2018-01-23 RX ADMIN — ONDANSETRON HYDROCHLORIDE 4 MG: 2 INJECTION, SOLUTION INTRAMUSCULAR; INTRAVENOUS at 12:29

## 2018-01-23 RX ADMIN — INSULIN HUMAN 2 UNITS: 100 INJECTION, SOLUTION PARENTERAL at 23:28

## 2018-01-23 RX ADMIN — PRAVASTATIN SODIUM 20 MG: 20 TABLET ORAL at 08:34

## 2018-01-23 RX ADMIN — TACROLIMUS 1.5 MG: 1 CAPSULE ORAL at 08:46

## 2018-01-23 RX ADMIN — PROMETHAZINE HYDROCHLORIDE 25 MG: 25 TABLET ORAL at 23:22

## 2018-01-23 RX ADMIN — PROMETHAZINE HYDROCHLORIDE 25 MG: 25 TABLET ORAL at 19:13

## 2018-01-23 RX ADMIN — HYDROMORPHONE HYDROCHLORIDE 1 MG: 2 INJECTION INTRAMUSCULAR; INTRAVENOUS; SUBCUTANEOUS at 12:33

## 2018-01-23 RX ADMIN — SERTRALINE 100 MG: 100 TABLET, FILM COATED ORAL at 21:20

## 2018-01-23 RX ADMIN — HYDROMORPHONE HYDROCHLORIDE 1 MG: 2 INJECTION INTRAMUSCULAR; INTRAVENOUS; SUBCUTANEOUS at 17:11

## 2018-01-23 RX ADMIN — HYDROMORPHONE HYDROCHLORIDE 1 MG: 2 INJECTION INTRAMUSCULAR; INTRAVENOUS; SUBCUTANEOUS at 06:18

## 2018-01-23 RX ADMIN — ASPIRIN 81 MG: 81 TABLET, COATED ORAL at 08:31

## 2018-01-23 RX ADMIN — TACROLIMUS 1.5 MG: 1 CAPSULE ORAL at 21:21

## 2018-01-23 RX ADMIN — ONDANSETRON HYDROCHLORIDE 4 MG: 2 INJECTION, SOLUTION INTRAMUSCULAR; INTRAVENOUS at 17:14

## 2018-01-23 RX ADMIN — PROMETHAZINE HYDROCHLORIDE 25 MG: 25 TABLET ORAL at 15:10

## 2018-01-23 RX ADMIN — INSULIN HUMAN 2 UNITS: 100 INJECTION, SOLUTION PARENTERAL at 18:28

## 2018-01-23 RX ADMIN — HYDROMORPHONE HYDROCHLORIDE 1 MG: 2 INJECTION INTRAMUSCULAR; INTRAVENOUS; SUBCUTANEOUS at 15:00

## 2018-01-23 RX ADMIN — HYDROMORPHONE HYDROCHLORIDE 1 MG: 2 INJECTION INTRAMUSCULAR; INTRAVENOUS; SUBCUTANEOUS at 10:38

## 2018-01-23 RX ADMIN — OMEPRAZOLE 40 MG: 20 CAPSULE, DELAYED RELEASE ORAL at 08:31

## 2018-01-23 RX ADMIN — ALPRAZOLAM 1 MG: 1 TABLET ORAL at 18:21

## 2018-01-23 RX ADMIN — HYDROMORPHONE HYDROCHLORIDE 1 MG: 2 INJECTION INTRAMUSCULAR; INTRAVENOUS; SUBCUTANEOUS at 23:22

## 2018-01-23 RX ADMIN — ENOXAPARIN SODIUM 40 MG: 100 INJECTION SUBCUTANEOUS at 08:31

## 2018-01-23 RX ADMIN — TADALAFIL 40 MG: 20 TABLET ORAL at 21:00

## 2018-01-23 RX ADMIN — SODIUM CHLORIDE: 9 INJECTION, SOLUTION INTRAVENOUS at 15:58

## 2018-01-23 RX ADMIN — HYDROMORPHONE HYDROCHLORIDE 1 MG: 2 INJECTION INTRAMUSCULAR; INTRAVENOUS; SUBCUTANEOUS at 21:16

## 2018-01-23 RX ADMIN — ONDANSETRON HYDROCHLORIDE 4 MG: 2 INJECTION, SOLUTION INTRAMUSCULAR; INTRAVENOUS at 07:37

## 2018-01-23 ASSESSMENT — ENCOUNTER SYMPTOMS
SPUTUM PRODUCTION: 0
NERVOUS/ANXIOUS: 1
ORTHOPNEA: 0
PND: 0
WEAKNESS: 1
EYES NEGATIVE: 1
INSOMNIA: 1
CHILLS: 0
SENSORY CHANGE: 0
NERVOUS/ANXIOUS: 0
SORE THROAT: 0
FEVER: 0
HALLUCINATIONS: 0
SPUTUM PRODUCTION: 1
SINUS PAIN: 0
DIARRHEA: 0
COUGH: 1
WHEEZING: 0
HEMOPTYSIS: 0
VOMITING: 0
HEADACHES: 0
SEIZURES: 0
NAUSEA: 1
FOCAL WEAKNESS: 1
SHORTNESS OF BREATH: 1
SPEECH CHANGE: 0
WEAKNESS: 0
VOMITING: 1
MUSCULOSKELETAL NEGATIVE: 1
DIAPHORESIS: 1
BLOOD IN STOOL: 0
PALPITATIONS: 0
ABDOMINAL PAIN: 1
FOCAL WEAKNESS: 0

## 2018-01-23 ASSESSMENT — PAIN SCALES - GENERAL
PAINLEVEL_OUTOF10: 8
PAINLEVEL_OUTOF10: 10
PAINLEVEL_OUTOF10: 10
PAINLEVEL_OUTOF10: 8
PAINLEVEL_OUTOF10: 10
PAINLEVEL_OUTOF10: 8
PAINLEVEL_OUTOF10: 8
PAINLEVEL_OUTOF10: 10
PAINLEVEL_OUTOF10: 8
PAINLEVEL_OUTOF10: 8
PAINLEVEL_OUTOF10: 10
PAINLEVEL_OUTOF10: 8
PAINLEVEL_OUTOF10: 8
PAINLEVEL_OUTOF10: 10
PAINLEVEL_OUTOF10: 8

## 2018-01-23 NOTE — PROGRESS NOTES
Pulmonary Critical Care Progress Note        DOS:  1/23/2018, admit 1/20/2018    Chief Complaint: SOB/cough/abdominal pain    History of Present Illness:   This is a pleasant 57-year-old female with a complicated past medical history including sarcoidosis resulting in liver failure, status post transplant in 2011 with associated microaspiration, pulmonary arterial hypertension and oxygen dependence on 5 liter per minute nasal cannula 24 hours a day, followed by [facility] who presented to   the emergency department complaining of 3-4 days of progressive worsening shortness of breath, productive cough, chills, abdominal pain and constipation.  She was evaluated in the emergency department and found to have evidence of sepsis as well as bilateral lower lobe pneumonia and underwent CT imaging, which did not show pulmonary embolism nor aneurysm.  She was   initially hypotensive, but responsive to IV fluids and is requiring oxygen in addition to her baseline for acute hypoxemia.  She is being admitted to the intensive care unit and I am consulted for assistance in her management.    Review of Systems   Constitutional: Positive for diaphoresis and malaise/fatigue. Negative for chills and fever.   HENT: Negative for congestion and sore throat.    Eyes: Negative.    Respiratory: Positive for cough, sputum production and shortness of breath. Negative for hemoptysis and wheezing.    Cardiovascular: Negative for chest pain, orthopnea and PND.   Gastrointestinal: Positive for abdominal pain and vomiting.   Genitourinary: Negative.    Musculoskeletal: Negative.    Neurological: Positive for focal weakness and weakness. Negative for sensory change, speech change and headaches.   Endo/Heme/Allergies: Negative.    Psychiatric/Behavioral: The patient is nervous/anxious and has insomnia.    pt has no change in her ROS: still complains of anxiety, pain to her spleen, nausea, and the inability to sleep    Interval Events:  24  hour interval history reviewed    - no events overnight   - neurologically intact   - continues to have nausea   - cortrak placed and trickle tube feeds started   - -140s   - SR 60-70s   - last BM yesterday   - ambulating/edge of bed   - no CXR today   - zosyn continuous infusion       Yesterday's Events:   - no events overnight   - neuro intact   - c/o abdominal pain from the spleen and received dilaudid   - -140s   - SR 60-70s   - Refusing all meals since admission   - 6 BMs last night   - NS at 100cc/hr   - zosyn   - O2 at 5 lpm NC--her basline    PFSH:  No change.    General: pleasant/cooperative, appears less toxic today, no vomiting, very tired in appearance  Skin: warm/dry, no rashes (no change)    Respiratory:     Pulse Oximetry: 95 %  HF NC O2  -> NC O2          Exam: breathing comfortably, faint coarse crackles, otherwise clear  Imaging: reviewed with team during rounds    CTA chest 1/20:  1.  No thoracic or abdominal aortic aneurysm or dissection.  2.  Findings consistent with pulmonary hypertension.  3.  No CT evidence for pulmonary embolus.  4.  Bilateral lower lobe consolidation, most concerning for pneumonia.  5.  Splenomegaly with splenic cyst again present.  6.  Increased colonic stool.        Invalid input(s): [id number]    HemoDynamics:  Pulse: 69, Heart Rate (Monitored): 68  NIBP: 121/52       Exam: RRR, no murmurs, trace to 1+ pedal edema, 2+ dpp=, good cap refill (no changes)  Imaging: Available data reviewed   ECHO 1/31/2017  CONCLUSIONS  Normal left ventricular size, thickness, systolic function, and diastolic function.  Left ventricular ejection fraction is visually estimated to be 65%.  The right ventricle was normal in size and function.  Moderate mitral regurgitation.  Moderate aortic insufficiency.  Compared to the images of the prior study done 05/24/2016.-  mitral   regurgitation and aortic regurgitation have increased, previously mild.            Neuro:  GCS  15        Exam: clear speech, symmetrical facial expressions, motor/sensory intact (no changes)  Imaging: Available data reviewed    Fluids:  Intake/Output       01/21/18 0700 - 01/22/18 0659 01/22/18 0700 - 01/23/18 0659 01/23/18 0700 - 01/24/18 0659      5537-2499 0852-9375 Total 0668-4316 2150-8174 Total 1902-3350 0005-0938 Total       Intake    P.O.  225  -- 225  430  90 520  --  -- --    P.O. 225 -- 225 430 90 520 -- -- --    I.V.  1399.6  1569.6 2969.2  1569.6  1308 2877.6  --  -- --    IV Piggyback Volume (IV Piggyback) 550 -- 550 -- -- -- -- -- --    IV Volume (NS) 600 1200 1800 1200 1000 2200 -- -- --    IV Volume (zosyn) 249.6 249.6 499.2 249.6 208 457.6 -- -- --    IV Volume (NS TKO) -- 120 120 120 100 220 -- -- --    Enteral  --  -- --  --  10 10  --  -- --    Free Water / Tube Flush -- -- -- -- 10 10 -- -- --    Total Intake 1624.6 1569.6 3194.2 1999.6 1408 3407.6 -- -- --       Output    Urine  1950 1100 3050  825  900 1725  --  -- --    Number of Times Voided 4 x 2 x 6 x 5 x 2 x 7 x -- -- --    Void (ml) 1950 1100 3050  -- -- --    Emesis  --  -- --  450  0 450  --  -- --    Emesis -- -- -- 450 0 450 -- -- --    Emesis - Number of Times -- -- -- 6 x -- 6 x -- -- --    Drains  --  -- --  --  0 0  --  -- --    Residual Amount (ml) (Discarded) -- -- -- -- 0 0 -- -- --    Stool/Urine  --  -- --  --  0 0  --  -- --    Measurable Stool (ml) -- -- -- -- 0 0 -- -- --    Stool  --  -- --  --  -- --  --  -- --    Number of Times Stooled 3 x -- 3 x -- 1 x 1 x -- -- --    Total Output 1950 1100 3050 9868 610 0138 -- -- --       Net I/O     -325.4 469.6 144.2 724.6 508 1232.6 -- -- --           Recent Labs      01/21/18   0627  01/22/18   0500  01/23/18   0420   SODIUM  138  142  140   POTASSIUM  3.7  3.4*  3.6   CHLORIDE  105  112  113*   CO2  27  24  16*   BUN  12  7*  9   CREATININE  0.82  0.67  0.76   CALCIUM  7.9*  7.7*  8.0*       GI/Nutrition:  Exam: hypoactive bowel sounds, soft, mild distension, no  tenderness, no masses (no changes)  Imaging: Available data reviewed  taking PO  Liver Function  Recent Labs      18   0500  18   0420   ALTSGPT  48  30  25   ASTSGOT  49*  26  17   ALKPHOSPHAT  25*  23*  24*   TBILIRUBIN  0.6  0.5  0.5   GLUCOSE  131*  102*  169*       Heme:  Recent Labs      18   0500  18   0420   RBC  3.59*  3.54*  3.82*   HEMOGLOBIN  10.5*  10.2*  10.9*   HEMATOCRIT  31.7*  31.3*  33.6*   PLATELETCT  54*  55*  61*       Infectious Disease:  Temp  Av.1 °C (98.8 °F)  Min: 36.3 °C (97.4 °F)  Max: 37.6 °C (99.6 °F)  Micro: antibiotics reviewed and cultures pending  Recent Labs      18   0500  18   0420   WBC  5.0  3.4*  3.5*   NEUTSPOLYS  82.40*  74.40*  86.20*   LYMPHOCYTES  8.20*  13.50*  8.60*   MONOCYTES  6.00  6.80  4.60   EOSINOPHILS  2.60  4.70  0.00   BASOPHILS  0.20  0.30  0.00   ASTSGOT  49*  26  17   ALTSGPT  48  30  25   ALKPHOSPHAT  25*  23*  24*   TBILIRUBIN  0.6  0.5  0.5     Current Facility-Administered Medications   Medication Dose Frequency Provider Last Rate Last Dose   • thiamine (THIAMINE) tablet 100 mg  100 mg DAILY Josemanuel Real M.D.   100 mg at 18 0927   • enoxaparin (LOVENOX) inj 40 mg  40 mg DAILY Josemanuel Real M.D.   40 mg at 18 1204   • HYDROmorphone (DILAUDID) injection 1 mg  1 mg Q2HRS PRN Josemanuel Real M.D.   1 mg at 18 0411   • ipratropium-albuterol (DUONEB) nebulizer solution 3 mL  3 mL Q4H PRN (RT) Terry Bob M.D.   3 mL at 18 0439   • trazodone (DESYREL) tablet 50 mg  50 mg QHS Racquel Oleary M.D.   50 mg at 18   • tiotropium (SPIRIVA) 18 MCG inhalation capsule 1 Cap  1 Cap DAILY Racquel Oleary M.D.   Stopped at 18 0853   • tacrolimus (PROGRAF) capsule 1.5 mg  1.5 mg BID Racquel Oleary M.D.   1.5 mg at 18   • sertraline (ZOLOFT) tablet 100 mg  100 mg QHS Racquel Oleary M.D.   100 mg at 18   •  pravastatin (PRAVACHOL) tablet 20 mg  20 mg DAILY Racquel Oleary M.D.   20 mg at 01/22/18 0930   • omeprazole (PRILOSEC) capsule 40 mg  40 mg DAILY Racquel Oleary M.D.   40 mg at 01/22/18 0930   • mycophenolate (CELLCEPT) capsule 500 mg  500 mg BID Racquel Oleary M.D.   500 mg at 01/22/18 2015   • morphine (MS IR) tablet 15 mg  15 mg Q4HRS PRN Racquel Oleary M.D.   15 mg at 01/21/18 0847   • levothyroxine (SYNTHROID) tablet 137 mcg  137 mcg AM ES Racquel Oleary M.D.   137 mcg at 01/22/18 0609   • aspirin EC (ECOTRIN) tablet 81 mg  81 mg DAILY Racquel Oleary M.D.   81 mg at 01/22/18 0930   • senna-docusate (PERICOLACE or SENOKOT S) 8.6-50 MG per tablet 2 Tab  2 Tab BID Racquel Oleary M.D.   Stopped at 01/22/18 2100    And   • polyethylene glycol/lytes (MIRALAX) PACKET 1 Packet  1 Packet QDAY PRN Racquel Oleary M.D.   1 Packet at 01/20/18 1523    And   • magnesium hydroxide (MILK OF MAGNESIA) suspension 30 mL  30 mL QDAY PRN Racquel Oleary M.D.        And   • bisacodyl (DULCOLAX) suppository 10 mg  10 mg QDAY PRN Racquel Oleary M.D.       • Respiratory Care per Protocol   Continuous RT Racquel Oleary M.D.       • NS infusion   Continuous Racquel Oleary M.D. 100 mL/hr at 01/22/18 2205     • NS (BOLUS) infusion 500 mL  500 mL Once PRN Racquel Oleary M.D.       • insulin regular (HUMULIN R) injection 2-9 Units  2-9 Units Q6HRS Racquel Oleary M.D.   Stopped at 01/21/18 1800    And   • glucose 4 g chewable tablet 16 g  16 g Q15 MIN PRN Racquel Oleary M.D.        And   • dextrose 50% (D50W) injection 25 mL  25 mL Q15 MIN PRN Racquel Oleary M.D.       • ondansetron (ZOFRAN) syringe/vial injection 4 mg  4 mg Q4HRS PRN Racquel Oleary M.D.   4 mg at 01/23/18 0208   • ondansetron (ZOFRAN ODT) dispertab 4 mg  4 mg Q4HRS PRN Racquel Oleary M.D.       • promethazine (PHENERGAN) tablet 12.5-25 mg  12.5-25 mg Q4HRS PRN Racquel Oleary M.D.   25 mg at 01/22/18 1309   • promethazine (PHENERGAN) suppository 12.5-25 mg   12.5-25 mg Q4HRS PRN Racquel Oleary M.D.   25 mg at 01/23/18 0018   • prochlorperazine (COMPAZINE) injection 5-10 mg  5-10 mg Q4HRS PRN Racquel Oleary M.D.   10 mg at 01/23/18 0408   • piperacillin-tazobactam (ZOSYN) 10.125 g in  mL infusion   Continuous Abx Jeremy M Gonda, M.D. 20.83 mL/hr at 01/22/18 1900     • ambrisentan (LETAIRIS) TABS 10 mg  10 mg DAILY Jeremy M Gonda, M.D.   10 mg at 01/22/18 0929   • alprazolam (XANAX) tablet 1 mg  1 mg TID PRN Jeremy M Gonda, M.D.   1 mg at 01/22/18 2327   • Linaclotide CAPS 1 Cap  1 Cap DAILY Jeremy M Gonda, M.D.   1 Cap at 01/22/18 0935   • predniSONE (DELTASONE) tablet 7 mg  7 mg DAILY Jeremy M Gonda, M.D.   7 mg at 01/22/18 0928   • Tadalafil (PAH) TABS 40 mg  40 mg QHS Jeremy M Gonda, M.D.   40 mg at 01/22/18 2330   • oxycodone immediate-release (ROXICODONE) tablet 5 mg  5 mg Q6HRS PRN Venancio Flores M.D.   5 mg at 01/21/18 1055     Last reviewed on 1/20/2018  4:56 PM by Colleen Schneider    Quality  Measures:  Medications reviewed, EKG reviewed, Labs reviewed and Radiology images reviewed  Holbrook catheter: No Holbrook      DVT Prophylaxis: Enoxaparin (Lovenox)  DVT prophylaxis - mechanical: SCDs  Ulcer prophylaxis: Yes          Problems/plan:  Severe sepsis from a pulmonary source.   S/p fluids   Pressors PRN - no so far   S/p sepsis protocols   Healthcare associated pneumonia.   Zosyn/Linezolid   ID following   Cultures pending  Acute-on-chronic hypoxemic respiratory failure.   RT protocols   HF NC weaned to NC - her baseline! -> 5 lpm NC   IS/Mobilize  Leukopenia - monitor  Anemia/Thrombocytopenia - transfuse PRN  Immunosuppressed state.  Liver disease, status post liver transplant.   Prednisone/Mycophenolate/Tacrolimus  H/o Sarcoidosis.  Hepatopulmonary syndrome.  Cardiomegaly - monitor  Severe pulmonary arterial hypertension, controlled on medications.   Continue home regimen - keep sats up with supplemental O2   Ambrisentan/Tadalafil  Stage III chronic  kidney disease   Adequate hydration, avoid nephrotoxins, renal dosing meds   Follow UO/renal function   Renal eval PRN  Insulin-dependent diabetes - glycemic control - SSI  Chronic pain - analgesia/mobilize  Constipation - bowel care protocols  Enteral nutrition  Prophylaxis    Pt remains stable with an unchanged exam, assessment, and plan.  She is safe for transfer out of the ICU today and will have the pulmonary team continue to follow.    Discussed patient condition and risk of morbidity and/or mortality with Family, RN, RT, Pharmacy, Charge nurse / hot rounds, Patient and hospitalist and infectious disease.    [id number]

## 2018-01-23 NOTE — CARE PLAN
Problem: Skin Integrity  Goal: Risk for impaired skin integrity will decrease  Intermittent sequential compression device in place 23 hours per day   Patient educated on importance of SCD's. SCD place bilaterally on lower extremities. Alternative interventions in use. Patient ambulate to chair and bedside commode. Will continue to assess and monitor.      Problem: Venous Thromboembolism (VTW)/Deep Vein Thrombosis (DVT) Prevention:  Goal: Patient will participate in Venous Thrombosis (VTE)/Deep Vein Thrombosis (DVT)Prevention Measures    Intervention: Ensure patient wears graduated elastic stockings (LUCIO hose) and/or SCDs, if ordered, when in bed or chair (Remove at least once per shift for skin check)  Intermittent sequential compression device in place 23 hours per day   Patient educated on importance of SCD's. SCD place bilaterally on lower extremities. Alternative interventions in use. Patient ambulate to chair and bedside commode. Will continue to assess and monitor.

## 2018-01-23 NOTE — PROGRESS NOTES
Infectious Disease Progress Note    Author: Silviano Murphypaola Date & Time created: 1/23/2018  11:17 AM    Interval History:  Abx day #3- Zosyn    Previously received ceftriaxone, unasyn    1/19: CT Chest w/o: Previously demonstrated RLL & LLL infiltrates have almost completely cleared.Emphysematous lungs.                                    A 2.9 mm superior segment right lower lobe nodule is probably unchanged.                                    Splenomegaly and 2.7 cm hypodense lesion within the spleen unchanged.  1/20: Blood culture: neg, CXR: Hypoinflation with bibasilar atelectasis and possible superimposed LEFT basilar pneumonia.           CT scan: CTA - No thoracic or abdominal aortic aneurysm or dissection. Findings consistent with pulmonary hypertension.                           No CT evidence for pulmonary embolus.                           Bilateral lower lobe consolidation, most concerning for pneumonia.                           Splenomegaly with splenic cyst again present.                            Increased colonic stool.  1/20: Right IJ placed. MRSA nasal swab negative  1/21: CRP 13.11. Vomiting/Nausea this am. No fevers. Reports spleen hurting. Linezolid stopped.  1/22: Continued nausea. No vomiting. Abdominal discomfort remains. Azithromycin stopped. No fevers. Had BMs.  1/23: Cortrak placed to increase caloric intake.  States still with nausea and abdominal pain.  States cannot take large meals due to previous gastric bypass.  Wants out of unit    Labs Reviewed, Medications Reviewed, Radiology Reviewed, Wound Reviewed, Fluids Reviewed and GI Nutrition Reviewed    Review of Systems:  Review of Systems   Constitutional: Negative for chills and fever.   Respiratory: Positive for cough. Negative for sputum production.    Cardiovascular: Negative for chest pain.   Gastrointestinal: Positive for abdominal pain and nausea. Negative for diarrhea.   Skin: Negative for rash.   Neurological: Negative for  weakness.     Physical Exam:  Physical Exam   Constitutional: She is oriented to person, place, and time. She appears well-developed and well-nourished.   Cardiovascular: Normal rate and regular rhythm.    No murmur heard.  Pulmonary/Chest: Effort normal. No respiratory distress. She has no rales.   Decreased BS at bases BL   Abdominal: Soft. Bowel sounds are normal. There is no tenderness.   Musculoskeletal: Normal range of motion. She exhibits no edema.   Neurological: She is alert and oriented to person, place, and time. No cranial nerve deficit.   Skin: Skin is warm and dry. No rash noted.   Psychiatric: She has a normal mood and affect.       Labs:  Recent Results (from the past 24 hour(s))   ACCU-CHEK GLUCOSE    Collection Time: 01/22/18 12:06 PM   Result Value Ref Range    Glucose - Accu-Ck 117 (H) 65 - 99 mg/dL   ACCU-CHEK GLUCOSE    Collection Time: 01/22/18  5:56 PM   Result Value Ref Range    Glucose - Accu-Ck 135 (H) 65 - 99 mg/dL   ACCU-CHEK GLUCOSE    Collection Time: 01/23/18 12:14 AM   Result Value Ref Range    Glucose - Accu-Ck 137 (H) 65 - 99 mg/dL   CBC with Differential    Collection Time: 01/23/18  4:20 AM   Result Value Ref Range    WBC 3.5 (L) 4.8 - 10.8 K/uL    RBC 3.82 (L) 4.20 - 5.40 M/uL    Hemoglobin 10.9 (L) 12.0 - 16.0 g/dL    Hematocrit 33.6 (L) 37.0 - 47.0 %    MCV 88.0 81.4 - 97.8 fL    MCH 28.5 27.0 - 33.0 pg    MCHC 32.4 (L) 33.6 - 35.0 g/dL    RDW 43.9 35.9 - 50.0 fL    Platelet Count 61 (L) 164 - 446 K/uL    MPV 9.6 9.0 - 12.9 fL    Neutrophils-Polys 86.20 (H) 44.00 - 72.00 %    Lymphocytes 8.60 (L) 22.00 - 41.00 %    Monocytes 4.60 0.00 - 13.40 %    Eosinophils 0.00 0.00 - 6.90 %    Basophils 0.00 0.00 - 1.80 %    Immature Granulocytes 0.60 0.00 - 0.90 %    Nucleated RBC 0.00 /100 WBC    Neutrophils (Absolute) 3.00 2.00 - 7.15 K/uL    Lymphs (Absolute) 0.30 (L) 1.00 - 4.80 K/uL    Monos (Absolute) 0.16 0.00 - 0.85 K/uL    Eos (Absolute) 0.00 0.00 - 0.51 K/uL    Baso (Absolute)  0.00 0.00 - 0.12 K/uL    Immature Granulocytes (abs) 0.02 0.00 - 0.11 K/uL    NRBC (Absolute) 0.00 K/uL   Comp Metabolic Panel (CMP)    Collection Time: 01/23/18  4:20 AM   Result Value Ref Range    Sodium 140 135 - 145 mmol/L    Potassium 3.6 3.6 - 5.5 mmol/L    Chloride 113 (H) 96 - 112 mmol/L    Co2 16 (L) 20 - 33 mmol/L    Anion Gap 11.0 0.0 - 11.9    Glucose 169 (H) 65 - 99 mg/dL    Bun 9 8 - 22 mg/dL    Creatinine 0.76 0.50 - 1.40 mg/dL    Calcium 8.0 (L) 8.5 - 10.5 mg/dL    AST(SGOT) 17 12 - 45 U/L    ALT(SGPT) 25 2 - 50 U/L    Alkaline Phosphatase 24 (L) 30 - 99 U/L    Total Bilirubin 0.5 0.1 - 1.5 mg/dL    Albumin 3.5 3.2 - 4.9 g/dL    Total Protein 5.7 (L) 6.0 - 8.2 g/dL    Globulin 2.2 1.9 - 3.5 g/dL    A-G Ratio 1.6 g/dL   ESTIMATED GFR    Collection Time: 01/23/18  4:20 AM   Result Value Ref Range    GFR If African American >60 >60 mL/min/1.73 m 2    GFR If Non African American >60 >60 mL/min/1.73 m 2   ACCU-CHEK GLUCOSE    Collection Time: 01/23/18  6:07 AM   Result Value Ref Range    Glucose - Accu-Ck 145 (H) 65 - 99 mg/dL     Results     Procedure Component Value Units Date/Time    BLOOD CULTURE [[id number]] Collected:  01/21/18 0312    Order Status:  Completed Specimen:  Blood Updated:  01/22/18 0750     Significant Indicator NEG     Source BLD     Site Peripheral     Blood Culture --     No Growth    Note: Blood cultures are incubated for 5 days and  are monitored continuously.Positive blood cultures  are called to the RN and reported as soon as  they are identified.      BLOOD CULTURE [[id number]] Collected:  01/21/18 0312    Order Status:  Completed Specimen:  Blood Updated:  01/22/18 0750     Significant Indicator NEG     Source BLD     Site --     Blood Culture --     No Growth    Note: Blood cultures are incubated for 5 days and  are monitored continuously.Positive blood cultures  are called to the RN and reported as soon as  they are identified.      Blood Culture [[id number]] Collected:   "01/20/18 0203    Order Status:  Completed Specimen:  Blood from Peripheral Updated:  01/21/18 0747     Significant Indicator NEG     Source BLD     Site PERIPHERAL     Blood Culture --     No Growth    Note: Blood cultures are incubated for 5 days and  are monitored continuously.Positive blood cultures  are called to the RN and reported as soon as  they are identified.      Narrative:       From different peripheral sites, if not done within the last  24 hours (Per Hospital Policy: Only change specimen source to  \"Line\" if specified by physician order)    Blood Culture [[id number]] Collected:  01/20/18 0626    Order Status:  Completed Specimen:  Blood from Peripheral Updated:  01/21/18 0747     Significant Indicator NEG     Source BLD     Site PERIPHERAL     Blood Culture --     No Growth    Note: Blood cultures are incubated for 5 days and  are monitored continuously.Positive blood cultures  are called to the RN and reported as soon as  they are identified.      Narrative:       From different peripheral sites, if not done within the last  24 hours (Per Hospital Policy: Only change specimen source to  \"Line\" if specified by physician order)    BLOOD CULTURE [[id number]]     Order Status:  No result Specimen:  Blood from Peripheral     BLOOD CULTURE [[id number]]     Order Status:  No result Specimen:  Blood from Peripheral     BLOOD CULTURE [[id number]]     Order Status:  No result Specimen:  Blood from Peripheral     BLOOD CULTURE [[id number]]     Order Status:  Canceled Specimen:  Blood from Peripheral     BLOOD CULTURE [[id number]]     Order Status:  Canceled Specimen:  Blood from Peripheral     BLOOD CULTURE [[id number]]     Order Status:  Canceled Specimen:  Blood from Peripheral     S. AUREUS BY PCR, NASAL COMPLETE [[id number]] Collected:  01/20/18 1454    Order Status:  Completed Specimen:  Nasal from Other Updated:  01/20/18 1716     Staph aureus by PCR Negative     MRSA by PCR Negative    Narrative:       Collected " By:[id number] NIMO ROCIO IBRAHIM    RESPIRATORY VIRUS PANEL BY PCR [[id number]] Collected:  18 1455    Order Status:  Completed Specimen:  Other from Nasopharyngeal Updated:  18 1459    Narrative:       Collected By:[id number] NIMO ROCIO IBRAHIM    INFLUENZA A/B BY PCR [[id number]] Collected:  18 1035    Order Status:  Completed Specimen:  Nasal from Nasopharyngeal Updated:  18 1117     Influenza virus A RNA Negative     Influenza virus B, PCR Negative    Narrative:       Collected By:[id number] NIMO ROCIO IBRAHIM    BLOOD CULTURE,HOLD [[id number]] Collected:  18 0623    Order Status:  Completed Updated:  18 0746     Blood Culture Hold Collected    Culture Respiratory W/ GRM STN [[id number]]     Order Status:  Completed Specimen:  Respirate from Sputum     BLOOD CULTURE,HOLD [[id number]] Collected:  18 0203    Order Status:  Completed Updated:  18 0304     Blood Culture Hold Collected        Hemodynamics:  Temp (24hrs), Av.2 °C (99 °F), Min:36.5 °C (97.7 °F), Max:37.6 °C (99.6 °F)  Temperature: 37.5 °C (99.5 °F)  Pulse  Av  Min: 61  Max: 105Heart Rate (Monitored): 79  NIBP: 118/62     PIV Group Left Antecubital 18g Saline Lock (Active)   Line Secured Taped;Transparent 2018  8:00 PM   Site Condition / Description Assessed;Patent;Clean;Dry;Intact 2018  8:00 PM   Dressing Type / Description Transparent;Clean;Dry;Intact 2018  8:00 PM   Dressing Status Observed 2018  8:00 PM   Saline Locked Yes 2018  8:00 PM   Infiltration Gr[place] [facility] [place]  8:00 PM   Phlebitis Scale (Used by [facility]) 0 2018  8:00 PM   Date Primary Tubing Changed 18  5:18 AM   Date Secondary Tubing Changed 18  5:18 AM   NEXT Primary Tubing Change  18  5:18 AM   NEXT Secondary Tubing Change  18  5:18 AM       Central Line Group Right;Internal Jugular Triple Lumen (Active)   Line Secured Sutured in  Place;Transparent 1/22/2018  8:00 PM   Patency and Function Check Performed at Beginning of Shift 1/22/2018  8:00 PM   Line Necessity Assessed Lack of Peripheral Access 1/22/2018  8:00 PM   Consider Removal of Femoral Line Not Applicable 1/22/2018  8:00 PM   Closed Tubing Set Up Yes 1/22/2018  8:00 PM   Hand Washing / Gloves Prior to Every Access Yes 1/22/2018  8:00 PM   Next Daily Chlorhexidine Bath Due (Regional ONLY) 01/23/18 1/22/2018  8:00 PM   Port Access  Scrub the Hub Prior to Access;Port Accessed;Blood Return ;Port De-Accessed 1/22/2018  8:00 PM   Site Condition / Description Assessed;Patent;Clean;Intact;Dry 1/22/2018  8:00 PM   Signs and Symptoms of Infection None Apparent at this Time 1/22/2018  8:00 PM   Dressing Type / Description Antimicrobial Patch (BioPatch);Transparent;Clean;Dry;Intact 1/22/2018  8:00 PM   Dressing Status Observed 1/22/2018  8:00 PM   Next Dressing Change  01/27/18 1/22/2018  8:00 PM   Date Primary Tubing Changed 01/23/18 1/22/2018  8:00 PM   Date Secondary Tubing Changed 01/20/18 1/21/2018  8:00 PM   NEXT Primary Tubing Change  01/27/18 1/22/2018  8:00 PM   NEXT Secondary Tubing Change  01/22/18 1/21/2018  8:00 PM   Date IV Connector(s) Changed 01/23/18 1/22/2018  8:00 PM   NEXT IV Connector(s) Change Date 01/27/18 1/22/2018  8:00 PM   Waveform Not Applicable 1/22/2018  8:00 PM   Line Calibrated Not Applicable 1/22/2018  8:00 PM     Fluids:  Intake/Output       01/21/18 0700 - 01/22/18 0659 01/22/18 0700 - 01/23/18 0659 01/23/18 0700 - 01/24/18 0659      [id number] 0239-5219 Total 3108-9591 0685-7297 Total 9270-4395 9034-1695 Total       Intake    P.O.  225  -- 225  430  90 520  300  -- 300    P.O. 225 -- 225 430 90 520 300 -- 300    I.V.  1399.6  1569.6 2969.2  1569.6  1569.6 3139.2  483.2  -- 483.2    IV Piggyback Volume (IV Piggyback) 550 -- 550 -- -- -- -- -- --    IV Volume (NS) 600 1200 1800 1200 1200 2400 400 -- 400    IV Volume (zosyn) 249.6 249.6 499.2 249.6 249.6 499.2  83.2 -- 83.2    IV Volume (NS TKO) -- 120 120 120 120 240 -- -- --    Other  --  -- --  --  30 30  100  -- 100    Medications (P.O./ Enteral Liquids) -- -- -- -- 30 30 100 -- 100    Enteral  --  -- --  --  90 90  100  -- 100    Enteral Volume -- -- -- -- 50 50 100 -- 100    Free Water / Tube Flush -- -- -- -- 40 40 -- -- --    Total Intake 1624.6 1569.6 3194.2 1999.6 1779.6 3779.2 983.2 -- 983.2       Output    Urine  1950  1100 3050  825  900 1725  --  -- --    Number of Times Voided 4 x 2 x 6 x 5 x 2 x 7 x 1 x -- 1 x    Void (ml) 1950 1100 3050  -- -- --    Emesis  --  -- --  450  0 450  --  -- --    Emesis -- -- -- 450 0 450 -- -- --    Emesis - Number of Times -- -- -- 6 x -- 6 x -- -- --    Drains  --  -- --  --  0 0  --  -- --    Residual Amount (ml) (Discarded) -- -- -- -- 0 0 -- -- --    Stool/Urine  --  -- --  --  0 0  --  -- --    Measurable Stool (ml) -- -- -- -- 0 0 -- -- --    Stool  --  -- --  --  -- --  --  -- --    Number of Times Stooled 3 x -- 3 x -- 1 x 1 x -- -- --    Total Output 1950 1100 3050 5052 855 9868 -- -- --       Net I/O     -325.4 469.6 144.2 724.6 879.6 1604.2 983.2 -- 983.2        Weight: 84.8 kg (186 lb 15.2 oz)  GI/Nutrition:  Orders Placed This Encounter   Procedures   • Diet Order     Standing Status:   Standing     Number of Occurrences:   1     Order Specific Question:   Diet:     Answer:   Regular [1]     Medications:  Current Facility-Administered Medications   Medication Last Dose   • thiamine (THIAMINE) tablet 100 mg 100 mg at 01/23/18 0831   • enoxaparin (LOVENOX) inj 40 mg 40 mg at 01/23/18 0831   • HYDROmorphone (DILAUDID) injection 1 mg 1 mg at 01/23/18 1038   • ipratropium-albuterol (DUONEB) nebulizer solution 3 mL 3 mL at 01/20/18 0439   • trazodone (DESYREL) tablet 50 mg 50 mg at 01/22/18 2015   • tiotropium (SPIRIVA) 18 MCG inhalation capsule 1 Cap Stopped at 01/21/18 0853   • tacrolimus (PROGRAF) capsule 1.5 mg 1.5 mg at 01/23/18 0846   • sertraline  (ZOLOFT) tablet 100 mg 100 mg at 01/22/18 2015   • pravastatin (PRAVACHOL) tablet 20 mg 20 mg at 01/23/18 0834   • omeprazole (PRILOSEC) capsule 40 mg 40 mg at 01/23/18 0831   • mycophenolate (CELLCEPT) capsule 500 mg 500 mg at 01/23/18 0843   • morphine (MS IR) tablet 15 mg 15 mg at 01/21/18 0847   • levothyroxine (SYNTHROID) tablet 137 mcg 137 mcg at 01/23/18 0609   • aspirin EC (ECOTRIN) tablet 81 mg 81 mg at 01/23/18 0831   • senna-docusate (PERICOLACE or SENOKOT S) 8.6-50 MG per tablet 2 Tab 2 Tab at 01/23/18 0831    And   • polyethylene glycol/lytes (MIRALAX) PACKET 1 Packet 1 Packet at 01/20/18 1523    And   • magnesium hydroxide (MILK OF MAGNESIA) suspension 30 mL      And   • bisacodyl (DULCOLAX) suppository 10 mg     • Respiratory Care per Protocol     • NS infusion     • NS (BOLUS) infusion 500 mL     • insulin regular (HUMULIN R) injection 2-9 Units Stopped at 01/21/18 1800    And   • glucose 4 g chewable tablet 16 g      And   • dextrose 50% (D50W) injection 25 mL     • ondansetron (ZOFRAN) syringe/vial injection 4 mg 4 mg at 01/23/18 0737   • ondansetron (ZOFRAN ODT) dispertab 4 mg     • promethazine (PHENERGAN) tablet 12.5-25 mg 25 mg at 01/22/18 1309   • promethazine (PHENERGAN) suppository 12.5-25 mg 25 mg at 01/23/18 0018   • prochlorperazine (COMPAZINE) injection 5-10 mg 10 mg at 01/23/18 0408   • piperacillin-tazobactam (ZOSYN) 10.125 g in  mL infusion     • ambrisentan (LETAIRIS) TABS 10 mg 10 mg at 01/23/18 0900   • alprazolam (XANAX) tablet 1 mg 1 mg at 01/23/18 0848   • Linaclotide CAPS 1 Cap 1 Cap at 01/23/18 0900   • predniSONE (DELTASONE) tablet 7 mg 7 mg at 01/23/18 0844   • Tadalafil (PAH) TABS 40 mg 40 mg at 01/22/18 2330   • oxycodone immediate-release (ROXICODONE) tablet 5 mg 5 mg at 01/21/18 1055     Medical Decision Making, by Problem:  Active Hospital Problems    Diagnosis   • *Sepsis (CMS-HCC) [A41.9]   • CAP (community acquired pneumonia) [J18.9]   • IRMA (acute kidney  injury) (CMS-HCC) [N17.9]   • Acute on chronic respiratory failure with hypoxia (CMS-HCC) [J96.21]   • Hypothyroidism, adult [E03.9]   • Status post liver transplant Dr. [place] ([facility]) [Z94.4]   • Thrombocytopenia (CMS-HCC) [D69.6]   • Drug-induced constipation [K59.03]   • IDDM (insulin dependent diabetes mellitus) (CMS-HCC) [E11.9, Z79.4]       Plan:  1.  Severe sepsis, likely secondary to pulmonary source.  2.  Acute Bilateral Lower lobe community acquired pneumonia  3.  Acute-on-chronic hypoxic respiratory failure.  4.  Severe pulmonary arterial hypertension.  5.  Liver disease, status post liver transplant -- immunosuppressed.  6.  Sarcoidosis.  7.  Hepatorenal syndrome.  8.  Stage III chronic kidney disease.  9.  Insulin-dependent diabetes mellitus -- controlled.     PLAN:   1. Serially remove abx. MRSA nasal screen neg - off linezolid and azithromycin.  Waiting for mycoplasma PCR.  2. Pending Mycoplasma. Diagnosis with this organisms seems low yield at this moment.  3. Continue zosyn - no sputum to acquire for sample.   4. Monitor SIRS  5. Pending viral panel - flu neg.  6. Nausea improved today - probably pain med related  7. Continue pred at current dose    Now with Cortrak and TF.  She has has a gastric bypass in the past.  Cannot have large volumes of TFs.  She claims her caloric intake in normally 900 iam ( seems a bit low ).  Don't think she needs to be in ICU.  Waiting for critical care to reassess.  Would like to deesculate antibiotics.  Waiting for Mycoplasma.  No sputum cx.  May not need PSE coverage.  Consider deesculating to Unasyn or Ceftriaxone.     The case was discussed with patient and RN    30 min >50% of time spent in coordination of care/counseling.

## 2018-01-23 NOTE — CARE PLAN
Problem: Pain Management  Goal: Pain level will decrease to patient's comfort goal  Outcome: PROGRESSING AS EXPECTED  RN to collaborate with pt and care team on adequate pain control for pt. RN to educate pt on effects of pain medications and alternate methods of to reduce pain and anxiety such as distractions, repositioning, and positive thinking.     Problem: Safety  Goal: Will remain free from injury  Outcome: PROGRESSING AS EXPECTED  RN to educate pt on importance of using call light prior to ambulation due to fall risk with medical equipment around. RN to instill use of bed alarm and educate pt on usage as it is to prevent harm and maintain safety of pt.

## 2018-01-23 NOTE — PROGRESS NOTES
Cortrak Placement    Tube Team verified patient name and medical record number prior to tube placement.  Cortrak tube (55 inches, 10 Serbian) placed at 90 cm in left nare.  Per Cortrak picture, tube appears to be in the small bowel.  Nursing Instructions: Awaiting KUB to confirm placement before use for medications or feeding. Once placement confirmed, flush tube with 30 ml of water, and then remove and save stylet, in patient medication drawer.

## 2018-01-24 ENCOUNTER — PATIENT OUTREACH (OUTPATIENT)
Dept: HEALTH INFORMATION MANAGEMENT | Facility: OTHER | Age: 58
End: 2018-01-24

## 2018-01-24 ENCOUNTER — APPOINTMENT (OUTPATIENT)
Dept: RADIOLOGY | Facility: MEDICAL CENTER | Age: 58
DRG: 871 | End: 2018-01-24
Attending: INTERNAL MEDICINE
Payer: MEDICARE

## 2018-01-24 ENCOUNTER — HOME CARE VISIT (OUTPATIENT)
Dept: HOME HEALTH SERVICES | Facility: HOME HEALTHCARE | Age: 58
End: 2018-01-24

## 2018-01-24 PROBLEM — R10.12 LEFT UPPER QUADRANT PAIN: Status: ACTIVE | Noted: 2018-01-24

## 2018-01-24 PROBLEM — E87.8 ELECTROLYTE ABNORMALITY: Status: ACTIVE | Noted: 2018-01-24

## 2018-01-24 LAB
ALBUMIN SERPL BCP-MCNC: 3.6 G/DL (ref 3.2–4.9)
ALBUMIN/GLOB SERPL: 1.9 G/DL
ALP SERPL-CCNC: 22 U/L (ref 30–99)
ALT SERPL-CCNC: 17 U/L (ref 2–50)
ANION GAP SERPL CALC-SCNC: 7 MMOL/L (ref 0–11.9)
AST SERPL-CCNC: 13 U/L (ref 12–45)
BASOPHILS # BLD AUTO: 0.4 % (ref 0–1.8)
BASOPHILS # BLD: 0.02 K/UL (ref 0–0.12)
BILIRUB SERPL-MCNC: 0.4 MG/DL (ref 0.1–1.5)
BUN SERPL-MCNC: 9 MG/DL (ref 8–22)
CALCIUM SERPL-MCNC: 8 MG/DL (ref 8.5–10.5)
CHLORIDE SERPL-SCNC: 113 MMOL/L (ref 96–112)
CO2 SERPL-SCNC: 20 MMOL/L (ref 20–33)
CREAT SERPL-MCNC: 0.73 MG/DL (ref 0.5–1.4)
EOSINOPHIL # BLD AUTO: 0.1 K/UL (ref 0–0.51)
EOSINOPHIL NFR BLD: 1.8 % (ref 0–6.9)
ERYTHROCYTE [DISTWIDTH] IN BLOOD BY AUTOMATED COUNT: 43.9 FL (ref 35.9–50)
GLOBULIN SER CALC-MCNC: 1.9 G/DL (ref 1.9–3.5)
GLUCOSE BLD-MCNC: 208 MG/DL (ref 65–99)
GLUCOSE BLD-MCNC: 249 MG/DL (ref 65–99)
GLUCOSE SERPL-MCNC: 201 MG/DL (ref 65–99)
HCT VFR BLD AUTO: 34.5 % (ref 37–47)
HGB BLD-MCNC: 11.4 G/DL (ref 12–16)
IMM GRANULOCYTES # BLD AUTO: 0.04 K/UL (ref 0–0.11)
IMM GRANULOCYTES NFR BLD AUTO: 0.7 % (ref 0–0.9)
LYMPHOCYTES # BLD AUTO: 0.53 K/UL (ref 1–4.8)
LYMPHOCYTES NFR BLD: 9.7 % (ref 22–41)
MAGNESIUM SERPL-MCNC: 1.5 MG/DL (ref 1.5–2.5)
MCH RBC QN AUTO: 28.6 PG (ref 27–33)
MCHC RBC AUTO-ENTMCNC: 33 G/DL (ref 33.6–35)
MCV RBC AUTO: 86.5 FL (ref 81.4–97.8)
MONOCYTES # BLD AUTO: 0.48 K/UL (ref 0–0.85)
MONOCYTES NFR BLD AUTO: 8.8 % (ref 0–13.4)
NEUTROPHILS # BLD AUTO: 4.27 K/UL (ref 2–7.15)
NEUTROPHILS NFR BLD: 78.6 % (ref 44–72)
NRBC # BLD AUTO: 0 K/UL
NRBC BLD-RTO: 0 /100 WBC
PHOSPHATE SERPL-MCNC: <1 MG/DL (ref 2.5–4.5)
PLATELET # BLD AUTO: 69 K/UL (ref 164–446)
PMV BLD AUTO: 9.9 FL (ref 9–12.9)
POTASSIUM SERPL-SCNC: 3.4 MMOL/L (ref 3.6–5.5)
PROT SERPL-MCNC: 5.5 G/DL (ref 6–8.2)
RBC # BLD AUTO: 3.99 M/UL (ref 4.2–5.4)
SODIUM SERPL-SCNC: 140 MMOL/L (ref 135–145)
WBC # BLD AUTO: 5.4 K/UL (ref 4.8–10.8)

## 2018-01-24 PROCEDURE — 85025 COMPLETE CBC W/AUTO DIFF WBC: CPT

## 2018-01-24 PROCEDURE — A9270 NON-COVERED ITEM OR SERVICE: HCPCS | Performed by: HOSPITALIST

## 2018-01-24 PROCEDURE — 700102 HCHG RX REV CODE 250 W/ 637 OVERRIDE(OP): Performed by: HOSPITALIST

## 2018-01-24 PROCEDURE — 700105 HCHG RX REV CODE 258: Performed by: HOSPITALIST

## 2018-01-24 PROCEDURE — 700111 HCHG RX REV CODE 636 W/ 250 OVERRIDE (IP): Mod: JG | Performed by: HOSPITALIST

## 2018-01-24 PROCEDURE — 700101 HCHG RX REV CODE 250: Performed by: HOSPITALIST

## 2018-01-24 PROCEDURE — 82962 GLUCOSE BLOOD TEST: CPT

## 2018-01-24 PROCEDURE — 700105 HCHG RX REV CODE 258: Performed by: INTERNAL MEDICINE

## 2018-01-24 PROCEDURE — 84100 ASSAY OF PHOSPHORUS: CPT

## 2018-01-24 PROCEDURE — A9270 NON-COVERED ITEM OR SERVICE: HCPCS | Performed by: INTERNAL MEDICINE

## 2018-01-24 PROCEDURE — 74018 RADEX ABDOMEN 1 VIEW: CPT

## 2018-01-24 PROCEDURE — 700111 HCHG RX REV CODE 636 W/ 250 OVERRIDE (IP): Performed by: INTERNAL MEDICINE

## 2018-01-24 PROCEDURE — 99233 SBSQ HOSP IP/OBS HIGH 50: CPT | Performed by: HOSPITALIST

## 2018-01-24 PROCEDURE — 80053 COMPREHEN METABOLIC PANEL: CPT

## 2018-01-24 PROCEDURE — 700102 HCHG RX REV CODE 250 W/ 637 OVERRIDE(OP): Performed by: INTERNAL MEDICINE

## 2018-01-24 PROCEDURE — 770001 HCHG ROOM/CARE - MED/SURG/GYN PRIV*

## 2018-01-24 PROCEDURE — 83735 ASSAY OF MAGNESIUM: CPT

## 2018-01-24 PROCEDURE — 700111 HCHG RX REV CODE 636 W/ 250 OVERRIDE (IP): Performed by: HOSPITALIST

## 2018-01-24 RX ORDER — OXYCODONE HYDROCHLORIDE 5 MG/1
5 TABLET ORAL EVERY 6 HOURS PRN
Status: DISCONTINUED | OUTPATIENT
Start: 2018-01-24 | End: 2018-01-26 | Stop reason: HOSPADM

## 2018-01-24 RX ORDER — OXYCODONE HYDROCHLORIDE 10 MG/1
10 TABLET ORAL EVERY 6 HOURS PRN
Status: DISCONTINUED | OUTPATIENT
Start: 2018-01-24 | End: 2018-01-26 | Stop reason: HOSPADM

## 2018-01-24 RX ORDER — OXYCODONE HYDROCHLORIDE 5 MG/1
5-10 TABLET ORAL EVERY 6 HOURS PRN
Status: DISCONTINUED | OUTPATIENT
Start: 2018-01-24 | End: 2018-01-24

## 2018-01-24 RX ORDER — INSULIN GLARGINE 100 [IU]/ML
10 INJECTION, SOLUTION SUBCUTANEOUS EVERY EVENING
Status: DISCONTINUED | OUTPATIENT
Start: 2018-01-24 | End: 2018-01-25

## 2018-01-24 RX ORDER — HYDROMORPHONE HYDROCHLORIDE 2 MG/ML
1 INJECTION, SOLUTION INTRAMUSCULAR; INTRAVENOUS; SUBCUTANEOUS EVERY 4 HOURS PRN
Status: DISCONTINUED | OUTPATIENT
Start: 2018-01-24 | End: 2018-01-25

## 2018-01-24 RX ADMIN — HYDROMORPHONE HYDROCHLORIDE 1 MG: 2 INJECTION INTRAMUSCULAR; INTRAVENOUS; SUBCUTANEOUS at 12:35

## 2018-01-24 RX ADMIN — PROMETHAZINE HYDROCHLORIDE 25 MG: 25 TABLET ORAL at 22:53

## 2018-01-24 RX ADMIN — OMEPRAZOLE 40 MG: 20 CAPSULE, DELAYED RELEASE ORAL at 08:28

## 2018-01-24 RX ADMIN — ALPRAZOLAM 1 MG: 1 TABLET ORAL at 07:47

## 2018-01-24 RX ADMIN — OXYCODONE HYDROCHLORIDE 10 MG: 10 TABLET ORAL at 22:53

## 2018-01-24 RX ADMIN — PROMETHAZINE HYDROCHLORIDE 25 MG: 25 TABLET ORAL at 16:04

## 2018-01-24 RX ADMIN — ASPIRIN 81 MG: 81 TABLET, COATED ORAL at 08:27

## 2018-01-24 RX ADMIN — INSULIN HUMAN 3 UNITS: 100 INJECTION, SOLUTION PARENTERAL at 17:33

## 2018-01-24 RX ADMIN — PROMETHAZINE HYDROCHLORIDE 25 MG: 25 TABLET ORAL at 07:50

## 2018-01-24 RX ADMIN — HYDROMORPHONE HYDROCHLORIDE 1 MG: 2 INJECTION INTRAMUSCULAR; INTRAVENOUS; SUBCUTANEOUS at 16:04

## 2018-01-24 RX ADMIN — CEFTRIAXONE 2 G: 2 INJECTION, POWDER, FOR SOLUTION INTRAMUSCULAR; INTRAVENOUS at 13:50

## 2018-01-24 RX ADMIN — ALPRAZOLAM 1 MG: 1 TABLET ORAL at 22:53

## 2018-01-24 RX ADMIN — ONDANSETRON HYDROCHLORIDE 4 MG: 2 INJECTION, SOLUTION INTRAMUSCULAR; INTRAVENOUS at 12:32

## 2018-01-24 RX ADMIN — ALPRAZOLAM 1 MG: 1 TABLET ORAL at 16:04

## 2018-01-24 RX ADMIN — INSULIN HUMAN 3 UNITS: 100 INJECTION, SOLUTION PARENTERAL at 11:50

## 2018-01-24 RX ADMIN — OXYCODONE HYDROCHLORIDE 10 MG: 10 TABLET ORAL at 13:55

## 2018-01-24 RX ADMIN — ONDANSETRON HYDROCHLORIDE 4 MG: 2 INJECTION, SOLUTION INTRAMUSCULAR; INTRAVENOUS at 06:05

## 2018-01-24 RX ADMIN — PRAVASTATIN SODIUM 20 MG: 20 TABLET ORAL at 08:28

## 2018-01-24 RX ADMIN — MYCOPHENOLATE MOFETIL 500 MG: 250 CAPSULE ORAL at 20:54

## 2018-01-24 RX ADMIN — SERTRALINE 100 MG: 100 TABLET, FILM COATED ORAL at 20:51

## 2018-01-24 RX ADMIN — INSULIN HUMAN 3 UNITS: 100 INJECTION, SOLUTION PARENTERAL at 06:11

## 2018-01-24 RX ADMIN — TRAZODONE HYDROCHLORIDE 50 MG: 50 TABLET ORAL at 20:53

## 2018-01-24 RX ADMIN — HYDROMORPHONE HYDROCHLORIDE 1 MG: 2 INJECTION INTRAMUSCULAR; INTRAVENOUS; SUBCUTANEOUS at 06:05

## 2018-01-24 RX ADMIN — PREDNISONE 7 MG: 1 TABLET ORAL at 08:28

## 2018-01-24 RX ADMIN — MYCOPHENOLATE MOFETIL 500 MG: 250 CAPSULE ORAL at 08:28

## 2018-01-24 RX ADMIN — LEVOTHYROXINE SODIUM 137 MCG: 137 TABLET ORAL at 06:06

## 2018-01-24 RX ADMIN — HYDROMORPHONE HYDROCHLORIDE 1 MG: 2 INJECTION INTRAMUSCULAR; INTRAVENOUS; SUBCUTANEOUS at 01:40

## 2018-01-24 RX ADMIN — TACROLIMUS 1.5 MG: 1 CAPSULE ORAL at 20:55

## 2018-01-24 RX ADMIN — HYDROMORPHONE HYDROCHLORIDE 1 MG: 2 INJECTION INTRAMUSCULAR; INTRAVENOUS; SUBCUTANEOUS at 20:47

## 2018-01-24 RX ADMIN — HYDROMORPHONE HYDROCHLORIDE 1 MG: 2 INJECTION INTRAMUSCULAR; INTRAVENOUS; SUBCUTANEOUS at 03:49

## 2018-01-24 RX ADMIN — ONDANSETRON HYDROCHLORIDE 4 MG: 2 INJECTION, SOLUTION INTRAMUSCULAR; INTRAVENOUS at 01:40

## 2018-01-24 RX ADMIN — SODIUM CHLORIDE: 9 INJECTION, SOLUTION INTRAVENOUS at 09:40

## 2018-01-24 RX ADMIN — AMBRISENTAN 10 MG: 10 TABLET, FILM COATED ORAL at 08:27

## 2018-01-24 RX ADMIN — ONDANSETRON HYDROCHLORIDE 4 MG: 2 INJECTION, SOLUTION INTRAMUSCULAR; INTRAVENOUS at 20:47

## 2018-01-24 RX ADMIN — TACROLIMUS 1.5 MG: 1 CAPSULE ORAL at 08:28

## 2018-01-24 RX ADMIN — HYDROMORPHONE HYDROCHLORIDE 1 MG: 2 INJECTION INTRAMUSCULAR; INTRAVENOUS; SUBCUTANEOUS at 08:22

## 2018-01-24 RX ADMIN — HYDROMORPHONE HYDROCHLORIDE 1 MG: 2 INJECTION INTRAMUSCULAR; INTRAVENOUS; SUBCUTANEOUS at 10:33

## 2018-01-24 RX ADMIN — INSULIN GLARGINE 10 UNITS: 100 INJECTION, SOLUTION SUBCUTANEOUS at 20:56

## 2018-01-24 RX ADMIN — STANDARDIZED SENNA CONCENTRATE AND DOCUSATE SODIUM 2 TABLET: 8.6; 5 TABLET, FILM COATED ORAL at 08:28

## 2018-01-24 RX ADMIN — TADALAFIL 40 MG: 20 TABLET ORAL at 21:00

## 2018-01-24 RX ADMIN — Medication 100 MG: at 08:28

## 2018-01-24 RX ADMIN — ENOXAPARIN SODIUM 40 MG: 100 INJECTION SUBCUTANEOUS at 08:27

## 2018-01-24 RX ADMIN — POTASSIUM PHOSPHATE, MONOBASIC AND POTASSIUM PHOSPHATE, DIBASIC 30 MMOL: 224; 236 INJECTION, SOLUTION INTRAVENOUS at 09:39

## 2018-01-24 RX ADMIN — POTASSIUM PHOSPHATE, MONOBASIC AND POTASSIUM PHOSPHATE, DIBASIC 30 MMOL: 224; 236 INJECTION, SOLUTION INTRAVENOUS at 16:26

## 2018-01-24 RX ADMIN — TIOTROPIUM BROMIDE 1 CAPSULE: 18 CAPSULE ORAL; RESPIRATORY (INHALATION) at 08:29

## 2018-01-24 RX ADMIN — SODIUM CHLORIDE: 9 INJECTION, SOLUTION INTRAVENOUS at 01:40

## 2018-01-24 RX ADMIN — PROMETHAZINE HYDROCHLORIDE 25 MG: 25 TABLET ORAL at 03:50

## 2018-01-24 ASSESSMENT — ENCOUNTER SYMPTOMS
SHORTNESS OF BREATH: 0
ORTHOPNEA: 0
WEAKNESS: 0
COUGH: 0
FOCAL WEAKNESS: 0
HALLUCINATIONS: 0
VOMITING: 1
MUSCULOSKELETAL NEGATIVE: 1
CHILLS: 0
NERVOUS/ANXIOUS: 0
EYES NEGATIVE: 1
SPUTUM PRODUCTION: 0
SENSORY CHANGE: 0
NERVOUS/ANXIOUS: 1
PALPITATIONS: 0
CONSTIPATION: 1
DIARRHEA: 0
DIAPHORESIS: 1
SEIZURES: 0
HEADACHES: 0
FEVER: 0
WHEEZING: 0
FOCAL WEAKNESS: 1
SHORTNESS OF BREATH: 1
NAUSEA: 1
SINUS PAIN: 0
SORE THROAT: 0
ABDOMINAL PAIN: 1
HEMOPTYSIS: 0
SPEECH CHANGE: 0
INSOMNIA: 1
WEAKNESS: 1
PND: 0
DIZZINESS: 0
BLOOD IN STOOL: 0
COUGH: 1

## 2018-01-24 ASSESSMENT — PAIN SCALES - GENERAL
PAINLEVEL_OUTOF10: 10
PAINLEVEL_OUTOF10: 10
PAINLEVEL_OUTOF10: 8
PAINLEVEL_OUTOF10: 7
PAINLEVEL_OUTOF10: 10
PAINLEVEL_OUTOF10: 8
PAINLEVEL_OUTOF10: 10
PAINLEVEL_OUTOF10: 8
PAINLEVEL_OUTOF10: 10
PAINLEVEL_OUTOF10: 8
PAINLEVEL_OUTOF10: 8
PAINLEVEL_OUTOF10: 10
PAINLEVEL_OUTOF10: 9
PAINLEVEL_OUTOF10: 8

## 2018-01-24 NOTE — DISCHARGE PLANNING
Per chart review patient has been on service with [facility] in the past. Consider referral to HH if patient is to DC home.

## 2018-01-24 NOTE — CARE PLAN
Problem: Safety  Goal: Will remain free from injury    Intervention: Provide assistance with mobility  RN to continue to educate pt on use of call light when needing to ambulate from bed to commode or chair due to the high risk for falling due to medical equipment and use of narcotics. RN to ensure pt's call light is within in reach at all times and ensure safe mobilization with standby assist per pt's needs.       Problem: Psychosocial Needs:  Goal: Level of anxiety will decrease  Outcome: PROGRESSING AS EXPECTED  RN to educate pt on ways to reduce anxiety such as distraction, voicing worries and concerns, and ensure comfort measures are met. RN to ensure all needs of pt are adequately met and assess needs at necessary intervals.

## 2018-01-24 NOTE — CARE PLAN
Problem: Nutritional:  Goal: Achieve adequate nutritional intake  Patient will consume >50-75% of meals   Outcome: NOT MET  Patient refusing solid foods and meals/supplements. Only taking sips with medications.     Goal: Nutrition support tolerated and meeting greater than 85% of estimated needs  Outcome: MET Date Met: 01/24/18  TF @ goal, Replete with Fiber @ 75 ml/hr meeting 100% of estimated nutrition needs.   Phos < 1 and K+ 3.4 - concern for refeeding syndrome.  Bring repleted per [facility].

## 2018-01-24 NOTE — ASSESSMENT & PLAN NOTE
With narcotic dependence  D/c  IV narcotics as a suspect they are contributing to her nausea and concern for drug-seeking behavior

## 2018-01-24 NOTE — CARE PLAN
Problem: Pain Management  Goal: Pain level will decrease to patient's comfort goal    Intervention: Follow pain managment plan developed in collaboration with patient and Interdisciplinary Team  Pain assessed and patient medicated per the MAR.       Problem: Communication  Goal: The ability to communicate needs accurately and effectively will improve    Intervention: Educate patient and significant other/support system about the plan of care, procedures, treatments, medications and allow for questions  Plan of care discussed with patient at bedside. All questions answered and needs met.

## 2018-01-24 NOTE — PROGRESS NOTES
Pulmonary Critical Care Progress Note        DOS:  1/24/2018, admit 1/20/2018    Chief Complaint: SOB/cough/abdominal pain    History of Present Illness:   This is a pleasant 57-year-old female with a complicated past medical history including sarcoidosis resulting in liver failure, status post transplant in 2011 with associated microaspiration, pulmonary arterial hypertension and oxygen dependence on 5 liter per minute nasal cannula 24 hours a day, followed by [facility] who presented to   the emergency department complaining of 3-4 days of progressive worsening shortness of breath, productive cough, chills, abdominal pain and constipation.  She was evaluated in the emergency department and found to have evidence of sepsis as well as bilateral lower lobe pneumonia and underwent CT imaging, which did not show pulmonary embolism nor aneurysm.  She was   initially hypotensive, but responsive to IV fluids and is requiring oxygen in addition to her baseline for acute hypoxemia.  She is being admitted to the intensive care unit and I am consulted for assistance in her management.    Review of Systems   Constitutional: Positive for diaphoresis and malaise/fatigue. Negative for chills and fever.   HENT: Negative for congestion and sore throat.    Eyes: Negative.    Respiratory: Negative for cough, hemoptysis, sputum production, shortness of breath and wheezing.    Cardiovascular: Negative for chest pain, orthopnea and PND.   Gastrointestinal: Positive for abdominal pain, constipation, nausea and vomiting. Negative for diarrhea.   Genitourinary: Negative.    Musculoskeletal: Negative.    Neurological: Positive for focal weakness and weakness. Negative for sensory change, speech change and headaches.   Endo/Heme/Allergies: Negative.    Psychiatric/Behavioral: The patient is nervous/anxious and has insomnia.    pt complains of nausea and abdominal pain.  No vomiting.  Really wants her relistor    Interval Events:  24  hour interval history reviewed    - no events overnight   - still complains of abdominal pain   - wants to use her own Relistor   - getting dilaudid every 2 hours   - SR 70-80s   - -150s   - TFs at goal   - still has nausea, but no vomiting   - up to commode to urinate at 800cc overnight   - zosyn at continuous infusion   - phosphorus < 1   - no CXR today   - no respiratory issues    Yesterday's Events:   - no events overnight   - neurologically intact   - continues to have nausea   - cortrak placed and trickle tube feeds started   - -140s   - SR 60-70s   - last BM yesterday   - ambulating/edge of bed   - no CXR today   - zosyn continuous infusion      PFSH:  No change.    General: pleasant/cooperative, tired in appearance, chronically ill in appearance, no distress  Skin: warm/dry, no rashes (exam unchanged)    Respiratory:     Pulse Oximetry: 95 %  HF NC O2  -> NC O2          Exam: faint basilar crackles, no wheezing, clear otherwise  Imaging: reviewed with team during rounds    CTA chest 1/20:  1.  No thoracic or abdominal aortic aneurysm or dissection.  2.  Findings consistent with pulmonary hypertension.  3.  No CT evidence for pulmonary embolus.  4.  Bilateral lower lobe consolidation, most concerning for pneumonia.  5.  Splenomegaly with splenic cyst again present.  6.  Increased colonic stool.    HemoDynamics:  Pulse: 74, Heart Rate (Monitored): 73  NIBP: 152/78       Exam: RRR, no murmurs, trace to 1+ pedal edema, 2+ dpp=, good cap refill (no changes)  Imaging: Available data reviewed   ECHO 1/31/2017  CONCLUSIONS  Normal left ventricular size, thickness, systolic function, and diastolic function.  Left ventricular ejection fraction is visually estimated to be 65%.  The right ventricle was normal in size and function.  Moderate mitral regurgitation.  Moderate aortic insufficiency.  Compared to the images of the prior study done 05/24/2016.-  mitral   regurgitation and aortic regurgitation have  increased, previously mild.            Neuro:  GCS  15       Exam: clear speech, symmetrical facial expressions, motor/sensory intact (exam unchanged)  Imaging: Available data reviewed    Fluids:  Intake/Output       01/22/18 0700 - 01/23/18 0659 01/23/18 0700 - 01/24/18 0659 01/24/18 0700 - 01/25/18 0659      9986-5140 7481-8181 Total 0619-5815 9288-2184 Total 7083-8993 9387-6516 Total       Intake    P.O.  430  90 520  450  -- 450  --  -- --    P.O. 430 90 520 450 -- 450 -- -- --    I.V.  1569.6  1569.6 3139.2  1449.6  1208 2657.6  --  -- --    IV Volume (NS) 1200 1200 2400 1200 1000 2200 -- -- --    IV Volume (zosyn) 249.6 249.6 499.2 249.6 208 457.6 -- -- --    IV Volume (NS TKO) 120 120 240 -- -- -- -- -- --    Other  --  30 30  100  330 430  --  -- --    Medications (P.O./ Enteral Liquids) -- 30 30 100 330 430 -- -- --    Enteral  --  90 90  440  770 1210  --  -- --    Enteral Volume -- 50 50 350 600 950 -- -- --    Free Water / Tube Flush -- 40 40 90 170 260 -- -- --    Total Intake 1999.6 1779.6 3779.2 2439.6 2308 4747.6 -- -- --       Output    Urine  825  900 1725  800  450 1250  --  -- --    Number of Times Voided 5 x 2 x 7 x 1 x 1 x 2 x -- -- --    Void (ml)   -- -- --    Emesis  450  0 450  --  -- --  --  -- --    Emesis 450 0 450 -- -- -- -- -- --    Emesis - Number of Times 6 x -- 6 x -- -- -- -- -- --    Drains  --  0 0  --  0 0  --  -- --    Residual Amount (ml) (Discarded) -- 0 0 -- 0 0 -- -- --    Stool/Urine  --  0 0  200  0 200  --  -- --    Measurable Stool (ml) -- 0 0 200 0 200 -- -- --    Stool  --  -- --  --  -- --  --  -- --    Number of Times Stooled -- 1 x 1 x 2 x 0 x 2 x -- -- --    Total Output 6530 792 2285 0807 521 7155 -- -- --       Net I/O     724.6 879.6 1604.2 1439.6 1858 3297.6 -- -- --        Weight: 85.4 kg (188 lb 4.4 oz)  Recent Labs      01/21/18   0627  01/22/18   0500  01/23/18   0420   SODIUM  138  142  140   POTASSIUM  3.7  3.4*  3.6   CHLORIDE   105  112  113*   CO2  27  24  16*   BUN  12  7*  9   CREATININE  0.82  0.67  0.76   MAGNESIUM   --    --   1.6   PHOSPHORUS   --    --   2.0*   CALCIUM  7.9*  7.7*  8.0*       GI/Nutrition:  Exam: hypoactive bowel sounds, soft, distended, no masses, no tenderness  Imaging: Available data reviewed  taking PO  Liver Function  Recent Labs      18   0618   0500  18   0420   ALTSGPT  48  30  25   ASTSGOT  49*  26  17   ALKPHOSPHAT  25*  23*  24*   TBILIRUBIN  0.6  0.5  0.5   GLUCOSE  131*  102*  169*       Heme:  Recent Labs      18   0500  18   0420  18   0400   RBC  3.54*  3.82*  3.99*   HEMOGLOBIN  10.2*  10.9*  11.4*   HEMATOCRIT  31.3*  33.6*  34.5*   PLATELETCT  55*  61*  69*       Infectious Disease:  Temp  Av.1 °C (98.7 °F)  Min: 36.5 °C (97.7 °F)  Max: 37.6 °C (99.6 °F)  Micro:reviewed  Recent Labs      18   0500  18   0420  18   0400   WBC  5.0  3.4*  3.5*  5.4   NEUTSPOLYS  82.40*  74.40*  86.20*  78.60*   LYMPHOCYTES  8.20*  13.50*  8.60*  9.70*   MONOCYTES  6.00  6.80  4.60  8.80   EOSINOPHILS  2.60  4.70  0.00  1.80   BASOPHILS  0.20  0.30  0.00  0.40   ASTSGOT  49*  26  17   --    ALTSGPT  48  30  25   --    ALKPHOSPHAT  25*  23*  24*   --    TBILIRUBIN  0.6  0.5  0.5   --      Current Facility-Administered Medications   Medication Dose Frequency Provider Last Rate Last Dose   • HYDROmorphone (DILAUDID) injection 1 mg  1 mg Q2HRS PRN Riley Marte M.D.   1 mg at 18 0349   • thiamine (THIAMINE) tablet 100 mg  100 mg DAILY Josemanuel Real M.D.   100 mg at 18 0831   • enoxaparin (LOVENOX) inj 40 mg  40 mg DAILY Josemanuel Real M.D.   40 mg at 18 0831   • ipratropium-albuterol (DUONEB) nebulizer solution 3 mL  3 mL Q4H PRN (RT) Terry Bob M.D.   3 mL at 18 0439   • trazodone (DESYREL) tablet 50 mg  50 mg QHS Racquel Oleary M.D.   50 mg at 18   • tiotropium (SPIRIVA) 18 MCG inhalation  capsule 1 Cap  1 Cap DAILY Racquel Oleary M.D.   Stopped at 01/21/18 0853   • tacrolimus (PROGRAF) capsule 1.5 mg  1.5 mg BID Racquel Oleary M.D.   1.5 mg at 01/23/18 2121   • sertraline (ZOLOFT) tablet 100 mg  100 mg QHS Racquel Oleary M.D.   100 mg at 01/23/18 2120   • pravastatin (PRAVACHOL) tablet 20 mg  20 mg DAILY Racquel Oleary M.D.   20 mg at 01/23/18 0834   • omeprazole (PRILOSEC) capsule 40 mg  40 mg DAILY Racquel Oleary M.D.   40 mg at 01/23/18 0831   • mycophenolate (CELLCEPT) capsule 500 mg  500 mg BID Racquel Oleary M.D.   500 mg at 01/23/18 2120   • morphine (MS IR) tablet 15 mg  15 mg Q4HRS PRN Racquel Oleary M.D.   15 mg at 01/21/18 0847   • levothyroxine (SYNTHROID) tablet 137 mcg  137 mcg AM ES Racquel Oleary M.D.   137 mcg at 01/23/18 0609   • aspirin EC (ECOTRIN) tablet 81 mg  81 mg DAILY Racquel Oleary M.D.   81 mg at 01/23/18 0831   • senna-docusate (PERICOLACE or SENOKOT S) 8.6-50 MG per tablet 2 Tab  2 Tab BID Racquel Oleary M.D.   Stopped at 01/23/18 2100    And   • polyethylene glycol/lytes (MIRALAX) PACKET 1 Packet  1 Packet QDAY PRN Racquel Oleary M.D.   1 Packet at 01/20/18 1523    And   • magnesium hydroxide (MILK OF MAGNESIA) suspension 30 mL  30 mL QDAY PRN Racquel Oleary M.D.        And   • bisacodyl (DULCOLAX) suppository 10 mg  10 mg QDAY PRN Racquel Oleary M.D.       • Respiratory Care per Protocol   Continuous RT Racquel Oleary M.D.       • NS infusion   Continuous Racquel Oleary M.D. 100 mL/hr at 01/24/18 0140     • NS (BOLUS) infusion 500 mL  500 mL Once PRN Racquel Oleary M.D.       • insulin regular (HUMULIN R) injection 2-9 Units  2-9 Units Q6HRS Racquel Oleary M.D.   2 Units at 01/23/18 9324    And   • glucose 4 g chewable tablet 16 g  16 g Q15 MIN PRN Racquel Oleary M.D.        And   • dextrose 50% (D50W) injection 25 mL  25 mL Q15 MIN PRN Racquel Oleary M.D.       • ondansetron (ZOFRAN) syringe/vial injection 4 mg  4 mg Q4HRS PRN Racquel Oleary M.D.    4 mg at 01/24/18 0140   • ondansetron (ZOFRAN ODT) dispertab 4 mg  4 mg Q4HRS PRN Racquel Oleary M.D.       • promethazine (PHENERGAN) tablet 12.5-25 mg  12.5-25 mg Q4HRS PRN Racquel Oleary M.D.   25 mg at 01/24/18 0350   • promethazine (PHENERGAN) suppository 12.5-25 mg  12.5-25 mg Q4HRS PRN Racquel Oleary M.D.   25 mg at 01/23/18 0018   • prochlorperazine (COMPAZINE) injection 5-10 mg  5-10 mg Q4HRS PRN Racquel Oleary M.D.   10 mg at 01/23/18 0408   • piperacillin-tazobactam (ZOSYN) 10.125 g in  mL infusion   Continuous Abx Jeremy M Gonda, M.D. 20.83 mL/hr at 01/23/18 1900     • ambrisentan (LETAIRIS) TABS 10 mg  10 mg DAILY Jeremy M Gonda, M.D.   10 mg at 01/23/18 0900   • alprazolam (XANAX) tablet 1 mg  1 mg TID PRN Jeremy M Gonda, M.D.   1 mg at 01/23/18 2322   • Linaclotide CAPS 1 Cap  1 Cap DAILY Jeremy M Gonda, M.D.   1 Cap at 01/23/18 0900   • predniSONE (DELTASONE) tablet 7 mg  7 mg DAILY Jeremy M Gonda, M.D.   7 mg at 01/23/18 0844   • Tadalafil (PAH) TABS 40 mg  40 mg QHS Jeremy M Gonda, M.D.   40 mg at 01/23/18 2100   • oxycodone immediate-release (ROXICODONE) tablet 5 mg  5 mg Q6HRS PRN Venancio Flores M.D.   5 mg at 01/21/18 1055     Last reviewed on 1/20/2018  4:56 PM by Colleen Schneider    Quality  Measures:  Medications reviewed, EKG reviewed, Labs reviewed and Radiology images reviewed  Holbrook catheter: No Holbrook  Central line in place: Concentrated IV drugs and Need for access    DVT Prophylaxis: Enoxaparin (Lovenox)  DVT prophylaxis - mechanical: SCDs  Ulcer prophylaxis: Yes          Problems/plan:  Severe sepsis from a pulmonary source.   S/p fluids   Pressors PRN - no so far   S/p sepsis protocols   Healthcare associated pneumonia.   Zosyn/Linezolid   ID following   Cultures pending  Acute-on-chronic hypoxemic respiratory failure.   RT protocols   HF NC weaned to NC - her baseline! -> 5 lpm NC   IS/Mobilize  Leukopenia - monitor  Anemia/Thrombocytopenia - transfuse  PRN  Immunosuppressed state.  Liver disease, status post liver transplant.   Prednisone/Mycophenolate/Tacrolimus  H/o Sarcoidosis.  Hepatopulmonary syndrome.  Cardiomegaly - monitor  Severe pulmonary arterial hypertension, controlled on medications.   Continue home regimen - keep sats up with supplemental O2   Ambrisentan/Tadalafil  Stage III chronic kidney disease   Adequate hydration, avoid nephrotoxins, renal dosing meds   Follow UO/renal function   Renal eval PRN  Insulin-dependent diabetes - glycemic control - SSI  Chronic pain - analgesia/mobilize  Constipation - bowel care protocols  Enteral nutrition  Prophylaxis    Pt's exam, assessment, and plan remain unchanged.  She is stable to be transferred out of the ICU and will have pulmonary continue to follow.    Discussed patient condition and risk of morbidity and/or mortality with Family, RN, RT, Pharmacy, Charge nurse / hot rounds, Patient and hospitalist and infectious disease.    [id number]

## 2018-01-24 NOTE — PROGRESS NOTES
[facility]ist Progress Note    Date of Service: 2018    Chief Complaint  57 y.o. female admitted 2018 with pneumonia and respiratory failure.  Patient has history of liver transplant chronically on 5 L of oxygen    Interval Problem Update  No overnight events, has chronic abdominal pain secondary to splenomegaly  Improved oxygen requirements to 5 L nasal cannula  Patient with multiple questions    Consultants/Specialty  Critical Care. I discussed her condition with Dr. Sheridan on ICU Hot Rounds.  ID.     Disposition  ICU        Review of Systems   Constitutional: Negative for chills and fever.   HENT: Negative for congestion and sinus pain.    Respiratory: Positive for cough and shortness of breath. Negative for hemoptysis.    Cardiovascular: Positive for leg swelling. Negative for chest pain and palpitations.   Gastrointestinal: Positive for abdominal pain and nausea. Negative for blood in stool, melena and vomiting.   Genitourinary: Negative for dysuria and hematuria.   Musculoskeletal: Positive for joint pain.   Skin: Negative for rash.   Neurological: Positive for weakness. Negative for speech change, focal weakness and seizures.   Psychiatric/Behavioral: Negative for hallucinations. The patient is not nervous/anxious.    All other systems reviewed and are negative.     Physical Exam  Laboratory/Imaging   Hemodynamics  Temp (24hrs), Av.3 °C (99.2 °F), Min:36.7 °C (98 °F), Max:37.6 °C (99.6 °F)   Temperature: 37.6 °C (99.6 °F)  Pulse  Av.9  Min: 61  Max: 105 Heart Rate (Monitored): 67  NIBP: 114/69      Respiratory      Respiration: 20, Pulse Oximetry: 95 %     Work Of Breathing / Effort: Tachypnea;Shallow  RUL Breath Sounds: Clear, RML Breath Sounds: Clear, RLL Breath Sounds: Diminished, MANJINDER Breath Sounds: Clear, LLL Breath Sounds: Diminished    Fluids    Intake/Output Summary (Last 24 hours) at 18 1649  Last data filed at 18 1600   Gross per 24 hour   Intake           4049.2  ml   Output             1500 ml   Net           2549.2 ml       Nutrition  Orders Placed This Encounter   Procedures   • Diet Order     Standing Status:   Standing     Number of Occurrences:   1     Order Specific Question:   Diet:     Answer:   Regular [1]     Physical Exam   Constitutional: She is oriented to person, place, and time. She appears well-developed. No distress.   HENT:   Head: Normocephalic and atraumatic.   Right Ear: External ear normal.   Left Ear: External ear normal.   Eyes: Conjunctivae are normal. Right eye exhibits no discharge. Left eye exhibits no discharge.   Neck: Neck supple. No JVD present. No tracheal deviation present.   Right IJ central line   Cardiovascular: Normal rate and regular rhythm.    No murmur heard.  Pulmonary/Chest: No stridor. No respiratory distress. She has rales.   Decreased air movement   Abdominal: She exhibits distension. There is tenderness (mild diffuse). There is no rebound and no guarding.   Musculoskeletal: She exhibits edema.   Neurological: She is alert and oriented to person, place, and time. No cranial nerve deficit. She exhibits normal muscle tone.   Skin: Skin is warm and dry. She is not diaphoretic. There is pallor.   Psychiatric: She has a normal mood and affect. Her behavior is normal. Thought content normal.   Nursing note and vitals reviewed.      Recent Labs      01/21/18 0627 01/22/18   0500  01/23/18   0420   WBC  5.0  3.4*  3.5*   RBC  3.59*  3.54*  3.82*   HEMOGLOBIN  10.5*  10.2*  10.9*   HEMATOCRIT  31.7*  31.3*  33.6*   MCV  88.3  88.4  88.0   MCH  29.2  28.8  28.5   MCHC  33.1*  32.6*  32.4*   RDW  45.4  45.1  43.9   PLATELETCT  54*  55*  61*   MPV  9.3  9.9  9.6     Recent Labs      01/21/18   0627 01/22/18   0500  01/23/18   0420   SODIUM  138  142  140   POTASSIUM  3.7  3.4*  3.6   CHLORIDE  105  112  113*   CO2  27  24  16*   GLUCOSE  131*  102*  169*   BUN  12  7*  9   CREATININE  0.82  0.67  0.76   CALCIUM  7.9*  7.7*  8.0*                       Assessment/Plan     * Sepsis (CMS-HCC)- (present on admission)   Assessment & Plan    Secondary to pneumonia with admission to the ICU.  She is immunosuppressed due to liver transplantation.     Sepsis has resolved        CAP (community acquired pneumonia)- (present on admission)   Assessment & Plan    Antibiotics de-escalated per infectious disease  Continue Zosyn  Follow-up on cultures        IRMA (acute kidney injury) (CMS-HCC)- (present on admission)   Assessment & Plan    resolved        Acute on chronic respiratory failure with hypoxia (CMS-HCC)- (present on admission)   Assessment & Plan    Improved requirements continue oxygen and RT protocol        Chronic pain   Assessment & Plan    With narcotic dependence  Try to taper off IV narcotics and minimize sedating agents        Thrombocytopenia (CMS-HCC)- (present on admission)   Assessment & Plan    Chronic  plts improved 61K        Drug-induced constipation- (present on admission)   Assessment & Plan    She wants to resume her home dose of relistor, discussed with pharmacist        IDDM (insulin dependent diabetes mellitus) (CMS-HCC)- (present on admission)   Assessment & Plan    Controlled   Continue ISS and monitor cbg's        Hypothyroidism, adult- (present on admission)   Assessment & Plan    Synthroid 137 mcg.        Status post liver transplant Dr. [place] ([facility])- (present on admission)   Assessment & Plan    Patient chronically immunosuppressed, continue home dose of Prograf prednisone and CellCept.            Reviewed items::  Labs reviewed and Medications reviewed  DVT prophylaxis pharmacological::  Contraindicated - High bleeding risk  Antibiotics:  Treating active infection/contamination beyond 24 hours perioperative coverage      Plan of care reviewed with the patient and  questions answered, total face to face time spent 35 minutes >50% counseling on test results and plan of care

## 2018-01-24 NOTE — PROGRESS NOTES
Dr. Sheridan and Dr. Arabella Marte want patient to keep central line until antibiotics are complete. Patient can transfer to the floor with central line. ICU Rn attempted to start ultrasound guided IV. No success. Patient has one PIV and central line. Charge RN aware.

## 2018-01-25 PROBLEM — R11.0 NAUSEA: Status: ACTIVE | Noted: 2018-01-25

## 2018-01-25 LAB
ALBUMIN SERPL BCP-MCNC: 3 G/DL (ref 3.2–4.9)
ALBUMIN/GLOB SERPL: 1.4 G/DL
ALP SERPL-CCNC: 23 U/L (ref 30–99)
ALT SERPL-CCNC: 18 U/L (ref 2–50)
ANION GAP SERPL CALC-SCNC: 7 MMOL/L (ref 0–11.9)
AST SERPL-CCNC: 21 U/L (ref 12–45)
BACTERIA BLD CULT: NORMAL
BACTERIA BLD CULT: NORMAL
BASOPHILS # BLD AUTO: 0.2 % (ref 0–1.8)
BASOPHILS # BLD: 0.01 K/UL (ref 0–0.12)
BILIRUB SERPL-MCNC: 0.3 MG/DL (ref 0.1–1.5)
BUN SERPL-MCNC: 7 MG/DL (ref 8–22)
CALCIUM SERPL-MCNC: 8.4 MG/DL (ref 8.4–10.2)
CHLORIDE SERPL-SCNC: 106 MMOL/L (ref 96–112)
CO2 SERPL-SCNC: 23 MMOL/L (ref 20–33)
CREAT SERPL-MCNC: 0.7 MG/DL (ref 0.5–1.4)
EOSINOPHIL # BLD AUTO: 0.16 K/UL (ref 0–0.51)
EOSINOPHIL NFR BLD: 3.3 % (ref 0–6.9)
ERYTHROCYTE [DISTWIDTH] IN BLOOD BY AUTOMATED COUNT: 42.5 FL (ref 35.9–50)
GLOBULIN SER CALC-MCNC: 2.2 G/DL (ref 1.9–3.5)
GLUCOSE BLD-MCNC: 136 MG/DL (ref 65–99)
GLUCOSE BLD-MCNC: 136 MG/DL (ref 65–99)
GLUCOSE BLD-MCNC: 169 MG/DL (ref 65–99)
GLUCOSE BLD-MCNC: 170 MG/DL (ref 65–99)
GLUCOSE BLD-MCNC: 200 MG/DL (ref 65–99)
GLUCOSE BLD-MCNC: 221 MG/DL (ref 65–99)
GLUCOSE SERPL-MCNC: 146 MG/DL (ref 65–99)
HCT VFR BLD AUTO: 31.9 % (ref 37–47)
HGB BLD-MCNC: 10.9 G/DL (ref 12–16)
IMM GRANULOCYTES # BLD AUTO: 0.04 K/UL (ref 0–0.11)
IMM GRANULOCYTES NFR BLD AUTO: 0.8 % (ref 0–0.9)
LYMPHOCYTES # BLD AUTO: 0.66 K/UL (ref 1–4.8)
LYMPHOCYTES NFR BLD: 13.6 % (ref 22–41)
MAGNESIUM SERPL-MCNC: 1.3 MG/DL (ref 1.5–2.5)
MCH RBC QN AUTO: 28.6 PG (ref 27–33)
MCHC RBC AUTO-ENTMCNC: 34.2 G/DL (ref 33.6–35)
MCV RBC AUTO: 83.7 FL (ref 81.4–97.8)
MONOCYTES # BLD AUTO: 0.47 K/UL (ref 0–0.85)
MONOCYTES NFR BLD AUTO: 9.7 % (ref 0–13.4)
NEUTROPHILS # BLD AUTO: 3.53 K/UL (ref 2–7.15)
NEUTROPHILS NFR BLD: 72.4 % (ref 44–72)
NRBC # BLD AUTO: 0 K/UL
NRBC BLD-RTO: 0 /100 WBC
PHOSPHATE SERPL-MCNC: 2.1 MG/DL (ref 2.5–4.5)
PLATELET # BLD AUTO: 62 K/UL (ref 164–446)
PMV BLD AUTO: 9.2 FL (ref 9–12.9)
POTASSIUM SERPL-SCNC: 3.3 MMOL/L (ref 3.6–5.5)
PROT SERPL-MCNC: 5.2 G/DL (ref 6–8.2)
RBC # BLD AUTO: 3.81 M/UL (ref 4.2–5.4)
SIGNIFICANT IND 70042: NORMAL
SIGNIFICANT IND 70042: NORMAL
SITE SITE: NORMAL
SITE SITE: NORMAL
SODIUM SERPL-SCNC: 136 MMOL/L (ref 135–145)
SOURCE SOURCE: NORMAL
SOURCE SOURCE: NORMAL
WBC # BLD AUTO: 4.9 K/UL (ref 4.8–10.8)

## 2018-01-25 PROCEDURE — 700111 HCHG RX REV CODE 636 W/ 250 OVERRIDE (IP): Performed by: HOSPITALIST

## 2018-01-25 PROCEDURE — 700111 HCHG RX REV CODE 636 W/ 250 OVERRIDE (IP): Performed by: INTERNAL MEDICINE

## 2018-01-25 PROCEDURE — A9270 NON-COVERED ITEM OR SERVICE: HCPCS | Performed by: HOSPITALIST

## 2018-01-25 PROCEDURE — 84100 ASSAY OF PHOSPHORUS: CPT

## 2018-01-25 PROCEDURE — 700102 HCHG RX REV CODE 250 W/ 637 OVERRIDE(OP): Performed by: INTERNAL MEDICINE

## 2018-01-25 PROCEDURE — 700102 HCHG RX REV CODE 250 W/ 637 OVERRIDE(OP): Performed by: HOSPITALIST

## 2018-01-25 PROCEDURE — 700111 HCHG RX REV CODE 636 W/ 250 OVERRIDE (IP): Mod: JG | Performed by: HOSPITALIST

## 2018-01-25 PROCEDURE — 700101 HCHG RX REV CODE 250: Performed by: HOSPITALIST

## 2018-01-25 PROCEDURE — 83735 ASSAY OF MAGNESIUM: CPT

## 2018-01-25 PROCEDURE — A9270 NON-COVERED ITEM OR SERVICE: HCPCS | Performed by: INTERNAL MEDICINE

## 2018-01-25 PROCEDURE — 99232 SBSQ HOSP IP/OBS MODERATE 35: CPT | Performed by: HOSPITALIST

## 2018-01-25 PROCEDURE — 700105 HCHG RX REV CODE 258: Performed by: HOSPITALIST

## 2018-01-25 PROCEDURE — 82962 GLUCOSE BLOOD TEST: CPT

## 2018-01-25 PROCEDURE — 85025 COMPLETE CBC W/AUTO DIFF WBC: CPT

## 2018-01-25 PROCEDURE — 80053 COMPREHEN METABOLIC PANEL: CPT

## 2018-01-25 PROCEDURE — 770006 HCHG ROOM/CARE - MED/SURG/GYN SEMI*

## 2018-01-25 RX ORDER — MAGNESIUM SULFATE HEPTAHYDRATE 40 MG/ML
4 INJECTION, SOLUTION INTRAVENOUS ONCE
Status: COMPLETED | OUTPATIENT
Start: 2018-01-25 | End: 2018-01-25

## 2018-01-25 RX ORDER — CEFDINIR 300 MG/1
300 CAPSULE ORAL EVERY 12 HOURS
Status: DISCONTINUED | OUTPATIENT
Start: 2018-01-25 | End: 2018-01-26 | Stop reason: HOSPADM

## 2018-01-25 RX ORDER — MORPHINE SULFATE 15 MG/1
15 TABLET ORAL EVERY 4 HOURS PRN
Status: DISCONTINUED | OUTPATIENT
Start: 2018-01-25 | End: 2018-01-26 | Stop reason: HOSPADM

## 2018-01-25 RX ORDER — DEXTROSE MONOHYDRATE 25 G/50ML
25 INJECTION, SOLUTION INTRAVENOUS
Status: DISCONTINUED | OUTPATIENT
Start: 2018-01-25 | End: 2018-01-26 | Stop reason: HOSPADM

## 2018-01-25 RX ORDER — INSULIN GLARGINE 100 [IU]/ML
15 INJECTION, SOLUTION SUBCUTANEOUS EVERY EVENING
Status: DISCONTINUED | OUTPATIENT
Start: 2018-01-25 | End: 2018-01-26 | Stop reason: HOSPADM

## 2018-01-25 RX ADMIN — PROMETHAZINE HYDROCHLORIDE 25 MG: 25 TABLET ORAL at 10:25

## 2018-01-25 RX ADMIN — CEFDINIR 300 MG: 300 CAPSULE ORAL at 21:34

## 2018-01-25 RX ADMIN — ONDANSETRON HYDROCHLORIDE 4 MG: 2 INJECTION, SOLUTION INTRAMUSCULAR; INTRAVENOUS at 17:29

## 2018-01-25 RX ADMIN — OXYCODONE HYDROCHLORIDE 10 MG: 10 TABLET ORAL at 06:08

## 2018-01-25 RX ADMIN — PROMETHAZINE HYDROCHLORIDE 25 MG: 25 TABLET ORAL at 14:35

## 2018-01-25 RX ADMIN — MAGNESIUM SULFATE IN WATER 4 G: 40 INJECTION, SOLUTION INTRAVENOUS at 17:30

## 2018-01-25 RX ADMIN — ONDANSETRON HYDROCHLORIDE 4 MG: 2 INJECTION, SOLUTION INTRAMUSCULAR; INTRAVENOUS at 12:49

## 2018-01-25 RX ADMIN — TRAZODONE HYDROCHLORIDE 50 MG: 50 TABLET ORAL at 21:34

## 2018-01-25 RX ADMIN — TIOTROPIUM BROMIDE 1 CAPSULE: 18 CAPSULE ORAL; RESPIRATORY (INHALATION) at 10:04

## 2018-01-25 RX ADMIN — MORPHINE SULFATE 15 MG: 15 TABLET ORAL at 17:56

## 2018-01-25 RX ADMIN — HYDROMORPHONE HYDROCHLORIDE 1 MG: 2 INJECTION INTRAMUSCULAR; INTRAVENOUS; SUBCUTANEOUS at 00:12

## 2018-01-25 RX ADMIN — ASPIRIN 81 MG: 81 TABLET, COATED ORAL at 10:05

## 2018-01-25 RX ADMIN — ALPRAZOLAM 1 MG: 1 TABLET ORAL at 06:08

## 2018-01-25 RX ADMIN — POTASSIUM PHOSPHATE, MONOBASIC AND POTASSIUM PHOSPHATE, DIBASIC 30 MMOL: 224; 236 INJECTION, SOLUTION INTRAVENOUS at 17:30

## 2018-01-25 RX ADMIN — ENOXAPARIN SODIUM 40 MG: 100 INJECTION SUBCUTANEOUS at 10:04

## 2018-01-25 RX ADMIN — HYDROMORPHONE HYDROCHLORIDE 1 MG: 2 INJECTION INTRAMUSCULAR; INTRAVENOUS; SUBCUTANEOUS at 08:28

## 2018-01-25 RX ADMIN — AMBRISENTAN 10 MG: 10 TABLET, FILM COATED ORAL at 10:06

## 2018-01-25 RX ADMIN — INSULIN HUMAN 2 UNITS: 100 INJECTION, SOLUTION PARENTERAL at 06:17

## 2018-01-25 RX ADMIN — ONDANSETRON HYDROCHLORIDE 4 MG: 2 INJECTION, SOLUTION INTRAMUSCULAR; INTRAVENOUS at 04:16

## 2018-01-25 RX ADMIN — MYCOPHENOLATE MOFETIL 500 MG: 250 CAPSULE ORAL at 21:34

## 2018-01-25 RX ADMIN — INSULIN HUMAN 2 UNITS: 100 INJECTION, SOLUTION PARENTERAL at 00:19

## 2018-01-25 RX ADMIN — STANDARDIZED SENNA CONCENTRATE AND DOCUSATE SODIUM 2 TABLET: 8.6; 5 TABLET, FILM COATED ORAL at 10:05

## 2018-01-25 RX ADMIN — TADALAFIL 40 MG: 20 TABLET ORAL at 21:00

## 2018-01-25 RX ADMIN — ALPRAZOLAM 1 MG: 1 TABLET ORAL at 10:02

## 2018-01-25 RX ADMIN — MYCOPHENOLATE MOFETIL 500 MG: 250 CAPSULE ORAL at 10:05

## 2018-01-25 RX ADMIN — INSULIN GLARGINE 15 UNITS: 100 INJECTION, SOLUTION SUBCUTANEOUS at 21:35

## 2018-01-25 RX ADMIN — STANDARDIZED SENNA CONCENTRATE AND DOCUSATE SODIUM 2 TABLET: 8.6; 5 TABLET, FILM COATED ORAL at 21:34

## 2018-01-25 RX ADMIN — SERTRALINE 100 MG: 100 TABLET, FILM COATED ORAL at 21:34

## 2018-01-25 RX ADMIN — LEVOTHYROXINE SODIUM 137 MCG: 137 TABLET ORAL at 06:39

## 2018-01-25 RX ADMIN — OXYCODONE HYDROCHLORIDE 10 MG: 10 TABLET ORAL at 21:34

## 2018-01-25 RX ADMIN — SODIUM CHLORIDE: 9 INJECTION, SOLUTION INTRAVENOUS at 10:44

## 2018-01-25 RX ADMIN — ONDANSETRON HYDROCHLORIDE 4 MG: 2 INJECTION, SOLUTION INTRAMUSCULAR; INTRAVENOUS at 23:46

## 2018-01-25 RX ADMIN — HYDROMORPHONE HYDROCHLORIDE 1 MG: 2 INJECTION INTRAMUSCULAR; INTRAVENOUS; SUBCUTANEOUS at 04:16

## 2018-01-25 RX ADMIN — INSULIN HUMAN 2 UNITS: 100 INJECTION, SOLUTION PARENTERAL at 11:48

## 2018-01-25 RX ADMIN — OXYCODONE HYDROCHLORIDE 10 MG: 10 TABLET ORAL at 14:31

## 2018-01-25 RX ADMIN — PROMETHAZINE HYDROCHLORIDE 25 MG: 25 TABLET ORAL at 06:08

## 2018-01-25 RX ADMIN — CEFTRIAXONE 2 G: 2 INJECTION, POWDER, FOR SOLUTION INTRAMUSCULAR; INTRAVENOUS at 09:20

## 2018-01-25 RX ADMIN — PRAVASTATIN SODIUM 20 MG: 20 TABLET ORAL at 10:05

## 2018-01-25 RX ADMIN — TACROLIMUS 1.5 MG: 1 CAPSULE ORAL at 10:05

## 2018-01-25 RX ADMIN — SODIUM CHLORIDE: 9 INJECTION, SOLUTION INTRAVENOUS at 00:12

## 2018-01-25 RX ADMIN — PREDNISONE 7 MG: 1 TABLET ORAL at 10:05

## 2018-01-25 RX ADMIN — OMEPRAZOLE 40 MG: 20 CAPSULE, DELAYED RELEASE ORAL at 10:05

## 2018-01-25 RX ADMIN — HYDROMORPHONE HYDROCHLORIDE 1 MG: 2 INJECTION INTRAMUSCULAR; INTRAVENOUS; SUBCUTANEOUS at 12:41

## 2018-01-25 RX ADMIN — Medication 100 MG: at 10:05

## 2018-01-25 RX ADMIN — ONDANSETRON HYDROCHLORIDE 4 MG: 2 INJECTION, SOLUTION INTRAMUSCULAR; INTRAVENOUS at 00:12

## 2018-01-25 RX ADMIN — ONDANSETRON HYDROCHLORIDE 4 MG: 2 INJECTION, SOLUTION INTRAMUSCULAR; INTRAVENOUS at 08:28

## 2018-01-25 RX ADMIN — TACROLIMUS 1.5 MG: 1 CAPSULE ORAL at 21:34

## 2018-01-25 ASSESSMENT — ENCOUNTER SYMPTOMS
DIZZINESS: 0
SPUTUM PRODUCTION: 0
CHILLS: 0
WEAKNESS: 1
EYE REDNESS: 0
FEVER: 0
MUSCULOSKELETAL NEGATIVE: 1
ABDOMINAL PAIN: 1
EYE DISCHARGE: 0
WHEEZING: 0
PND: 0
SEIZURES: 0
DIAPHORESIS: 1
PALPITATIONS: 0
ORTHOPNEA: 0
SPEECH CHANGE: 0
DIARRHEA: 0
SENSORY CHANGE: 0
HALLUCINATIONS: 0
EYES NEGATIVE: 1
WEAKNESS: 0
NERVOUS/ANXIOUS: 1
NERVOUS/ANXIOUS: 0
SORE THROAT: 0
CONSTIPATION: 1
COUGH: 0
HEMOPTYSIS: 0
INSOMNIA: 1
FOCAL WEAKNESS: 1
NAUSEA: 1
SHORTNESS OF BREATH: 0
FALLS: 0
COUGH: 1
FOCAL WEAKNESS: 0
BLOOD IN STOOL: 0
VOMITING: 1
HEADACHES: 0

## 2018-01-25 ASSESSMENT — PAIN SCALES - GENERAL
PAINLEVEL_OUTOF10: 10
PAINLEVEL_OUTOF10: 10
PAINLEVEL_OUTOF10: 7
PAINLEVEL_OUTOF10: 9
PAINLEVEL_OUTOF10: 10
PAINLEVEL_OUTOF10: 7
PAINLEVEL_OUTOF10: 10
PAINLEVEL_OUTOF10: 7
PAINLEVEL_OUTOF10: 8
PAINLEVEL_OUTOF10: 10
PAINLEVEL_OUTOF10: 8
PAINLEVEL_OUTOF10: 7
PAINLEVEL_OUTOF10: 7
PAINLEVEL_OUTOF10: 10
PAINLEVEL_OUTOF10: 7
PAINLEVEL_OUTOF10: 10
PAINLEVEL_OUTOF10: 8

## 2018-01-25 NOTE — PROGRESS NOTES
Dr. Sheridan at bedside and made aware of patient's uncontrollable pain given current pain medication regimen. No new orders received. RN does not need to call MD with results of abdominal X-ray.

## 2018-01-25 NOTE — PROGRESS NOTES
Infectious Disease Progress Note    Author: Silviano Murphypaola Date & Time created: 1/24/2018  5:27 PM    Interval History:  Abx day #4 - Zosyn    Previously received ceftriaxone, unasyn    1/19: CT Chest w/o: Previously demonstrated RLL & LLL infiltrates have almost completely cleared.Emphysematous lungs.                                    A 2.9 mm superior segment right lower lobe nodule is probably unchanged.                                    Splenomegaly and 2.7 cm hypodense lesion within the spleen unchanged.  1/20: Blood culture: neg, CXR: Hypoinflation with bibasilar atelectasis and possible superimposed LEFT basilar pneumonia.           CT scan: CTA - No thoracic or abdominal aortic aneurysm or dissection. Findings consistent with pulmonary hypertension.                           No CT evidence for pulmonary embolus.                           Bilateral lower lobe consolidation, most concerning for pneumonia.                           Splenomegaly with splenic cyst again present.                            Increased colonic stool.  1/20: Right IJ placed. MRSA nasal swab negative  1/21: CRP 13.11. Vomiting/Nausea this am. No fevers. Reports spleen hurting. Linezolid stopped.  1/22: Continued nausea. No vomiting. Abdominal discomfort remains. Azithromycin stopped. No fevers. Had BMs.  1/23: Cortrak placed to increase caloric intake.  States still with nausea and abdominal pain.  States cannot take large meals due to previous gastric bypass.  Wants out of unit  1/24: Continued nausea and reported vomiting. No fevers. Continued abdominal pains.     Labs Reviewed, Medications Reviewed, Radiology Reviewed, Wound Reviewed, Fluids Reviewed and GI Nutrition Reviewed    Review of Systems:  Review of Systems   Constitutional: Negative for chills and fever.   Respiratory: Positive for cough. Negative for sputum production.    Cardiovascular: Negative for chest pain.   Gastrointestinal: Positive for abdominal pain and  nausea. Negative for diarrhea.   Skin: Negative for rash.   Neurological: Negative for weakness.     Physical Exam:  Physical Exam   Constitutional: She is oriented to person, place, and time. She appears well-developed and well-nourished.   Cardiovascular: Normal rate and regular rhythm.    No murmur heard.  Pulmonary/Chest: Effort normal. No respiratory distress. She has no rales.   Decreased BS at bases BL   Abdominal: Soft. Bowel sounds are normal. There is no tenderness.   Musculoskeletal: Normal range of motion. She exhibits no edema.   Neurological: She is alert and oriented to person, place, and time. No cranial nerve deficit.   Skin: Skin is warm and dry. No rash noted.   Psychiatric: She has a normal mood and affect.       Labs:  Recent Results (from the past 24 hour(s))   ACCU-CHEK GLUCOSE    Collection Time: 01/23/18  6:26 PM   Result Value Ref Range    Glucose - Accu-Ck 166 (H) 65 - 99 mg/dL   ACCU-CHEK GLUCOSE    Collection Time: 01/23/18 11:27 PM   Result Value Ref Range    Glucose - Accu-Ck 163 (H) 65 - 99 mg/dL   MAGNESIUM    Collection Time: 01/24/18  4:00 AM   Result Value Ref Range    Magnesium 1.5 1.5 - 2.5 mg/dL   PHOSPHORUS    Collection Time: 01/24/18  4:00 AM   Result Value Ref Range    Phosphorus <1.0 (LL) 2.5 - 4.5 mg/dL   CBC with Differential    Collection Time: 01/24/18  4:00 AM   Result Value Ref Range    WBC 5.4 4.8 - 10.8 K/uL    RBC 3.99 (L) 4.20 - 5.40 M/uL    Hemoglobin 11.4 (L) 12.0 - 16.0 g/dL    Hematocrit 34.5 (L) 37.0 - 47.0 %    MCV 86.5 81.4 - 97.8 fL    MCH 28.6 27.0 - 33.0 pg    MCHC 33.0 (L) 33.6 - 35.0 g/dL    RDW 43.9 35.9 - 50.0 fL    Platelet Count 69 (L) 164 - 446 K/uL    MPV 9.9 9.0 - 12.9 fL    Neutrophils-Polys 78.60 (H) 44.00 - 72.00 %    Lymphocytes 9.70 (L) 22.00 - 41.00 %    Monocytes 8.80 0.00 - 13.40 %    Eosinophils 1.80 0.00 - 6.90 %    Basophils 0.40 0.00 - 1.80 %    Immature Granulocytes 0.70 0.00 - 0.90 %    Nucleated RBC 0.00 /100 WBC    Neutrophils  (Absolute) 4.27 2.00 - 7.15 K/uL    Lymphs (Absolute) 0.53 (L) 1.00 - 4.80 K/uL    Monos (Absolute) 0.48 0.00 - 0.85 K/uL    Eos (Absolute) 0.10 0.00 - 0.51 K/uL    Baso (Absolute) 0.02 0.00 - 0.12 K/uL    Immature Granulocytes (abs) 0.04 0.00 - 0.11 K/uL    NRBC (Absolute) 0.00 K/uL   Comp Metabolic Panel (CMP)    Collection Time: 01/24/18  4:00 AM   Result Value Ref Range    Sodium 140 135 - 145 mmol/L    Potassium 3.4 (L) 3.6 - 5.5 mmol/L    Chloride 113 (H) 96 - 112 mmol/L    Co2 20 20 - 33 mmol/L    Anion Gap 7.0 0.0 - 11.9    Glucose 201 (H) 65 - 99 mg/dL    Bun 9 8 - 22 mg/dL    Creatinine 0.73 0.50 - 1.40 mg/dL    Calcium 8.0 (L) 8.5 - 10.5 mg/dL    AST(SGOT) 13 12 - 45 U/L    ALT(SGPT) 17 2 - 50 U/L    Alkaline Phosphatase 22 (L) 30 - 99 U/L    Total Bilirubin 0.4 0.1 - 1.5 mg/dL    Albumin 3.6 3.2 - 4.9 g/dL    Total Protein 5.5 (L) 6.0 - 8.2 g/dL    Globulin 1.9 1.9 - 3.5 g/dL    A-G Ratio 1.9 g/dL   ESTIMATED GFR    Collection Time: 01/24/18  4:00 AM   Result Value Ref Range    GFR If African American >60 >60 mL/min/1.73 m 2    GFR If Non African American >60 >60 mL/min/1.73 m 2   ACCU-CHEK GLUCOSE    Collection Time: 01/24/18  6:10 AM   Result Value Ref Range    Glucose - Accu-Ck 208 (H) 65 - 99 mg/dL   ACCU-CHEK GLUCOSE    Collection Time: 01/24/18 11:49 AM   Result Value Ref Range    Glucose - Accu-Ck 249 (H) 65 - 99 mg/dL     Results     Procedure Component Value Units Date/Time    RESPIRATORY VIRUS PANEL BY PCR [[id number]] Collected:  01/20/18 9411    Order Status:  Completed Specimen:  Other from Nasopharyngeal Updated:  01/23/18 7619     Influenza virus A RNA Not Detected     Influenza virus A H1 RNA Not Detected     Influenza virus A H3 RNA Not Detected     Influenza A 2009, H1N1, PCR Not Detected     Influenza virus B, PCR Not Detected     Resp Syncytial Virus A, PCR Not Detected     Resp Syncytial Virus B, PCR Not Detected     Parainfluenza virus 1, PCR Not Detected     Parainfluenza virus 2,  "PCR Not Detected     Parainfluenza virus 3, PCR Not Detected     Human Metapneumovirus, PCR Not Detected     Human Rhinovirus, PCR Not Detected     Adenovirus B/E, PCR Not Detected     Adenovirus C, PCR Not Detected     Comment: Performed by [facility],  [address], [facility] [place] [address] [phone]  [email or web address], Ronnie Kwon MD - Lab. Director         Narrative:       Collected By:[id number] NIMO IBRAHIM    BLOOD CULTURE [[id number]] Collected:  01/21/18 0312    Order Status:  Completed Specimen:  Blood Updated:  01/22/18 0750     Significant Indicator NEG     Source BLD     Site Peripheral     Blood Culture --     No Growth    Note: Blood cultures are incubated for 5 days and  are monitored continuously.Positive blood cultures  are called to the RN and reported as soon as  they are identified.      BLOOD CULTURE [[id number]] Collected:  01/21/18 0312    Order Status:  Completed Specimen:  Blood Updated:  01/22/18 0750     Significant Indicator NEG     Source BLD     Site --     Blood Culture --     No Growth    Note: Blood cultures are incubated for 5 days and  are monitored continuously.Positive blood cultures  are called to the RN and reported as soon as  they are identified.      Blood Culture [[id number]] Collected:  01/20/18 0203    Order Status:  Completed Specimen:  Blood from Peripheral Updated:  01/21/18 0747     Significant Indicator NEG     Source BLD     Site PERIPHERAL     Blood Culture --     No Growth    Note: Blood cultures are incubated for 5 days and  are monitored continuously.Positive blood cultures  are called to the RN and reported as soon as  they are identified.      Narrative:       From different peripheral sites, if not done within the last  24 hours (Per Hospital Policy: Only change specimen source to  \"Line\" if specified by physician order)    Blood Culture [[id number]] Collected:  01/20/18 0626    Order Status:  Completed Specimen:  Blood from Peripheral Updated:  01/21/18 0747 " "    Significant Indicator NEG     Source BLD     Site PERIPHERAL     Blood Culture --     No Growth    Note: Blood cultures are incubated for 5 days and  are monitored continuously.Positive blood cultures  are called to the RN and reported as soon as  they are identified.      Narrative:       From different peripheral sites, if not done within the last  24 hours (Per Hospital Policy: Only change specimen source to  \"Line\" if specified by physician order)    BLOOD CULTURE [[id number]]     Order Status:  No result Specimen:  Blood from Peripheral     BLOOD CULTURE [[id number]]     Order Status:  No result Specimen:  Blood from Peripheral     BLOOD CULTURE [[id number]]     Order Status:  No result Specimen:  Blood from Peripheral     BLOOD CULTURE [[id number]]     Order Status:  Canceled Specimen:  Blood from Peripheral     BLOOD CULTURE [[id number]]     Order Status:  Canceled Specimen:  Blood from Peripheral     BLOOD CULTURE [[id number]]     Order Status:  Canceled Specimen:  Blood from Peripheral     S. AUREUS BY PCR, NASAL COMPLETE [[id number]] Collected:  01/20/18 1454    Order Status:  Completed Specimen:  Nasal from Other Updated:  01/20/18 1716     Staph aureus by PCR Negative     MRSA by PCR Negative    Narrative:       Collected By:[id number] NIMO IBRAHIM    INFLUENZA A/B BY PCR [[id number]] Collected:  01/20/18 1035    Order Status:  Completed Specimen:  Nasal from Nasopharyngeal Updated:  01/20/18 1117     Influenza virus A RNA Negative     Influenza virus B, PCR Negative    Narrative:       Collected By:[id number] NIMO IBRAHIM    BLOOD CULTURE,HOLD [[id number]] Collected:  01/20/18 0623    Order Status:  Completed Updated:  01/20/18 0746     Blood Culture Hold Collected    BLOOD CULTURE,HOLD [[id number]] Collected:  01/20/18 0203    Order Status:  Completed Updated:  01/20/18 0304     Blood Culture Hold Collected    Culture Respiratory W/ GRM STN [[id number]] Collected:  01/20/18 0000    Order Status:  Canceled " Specimen:  Sputum from Sputum         Hemodynamics:  Temp (24hrs), Av.1 °C (98.8 °F), Min:36.5 °C (97.7 °F), Max:37.9 °C (100.2 °F)  Temperature: 37.8 °C (100 °F)  Pulse  Av.6  Min: 61  Max: 105Heart Rate (Monitored): 77  NIBP: 144/73     PIV Group Left Antecubital 18g Saline Lock (Active)   Line Secured Taped;Transparent 2018  8:00 AM   Site Condition / Description Assessed;Patent;Dry;Intact 2018  8:00 AM   Dressing Type / Description Transparent;Intact;Old / Dried Drainage;Dry 2018  8:00 AM   Dressing Status Observed 2018  8:00 AM   Saline Locked Yes 2018  8:00 AM   Infiltration Grading Used by [facility] 0 2018  8:00 AM   Phlebitis Scale (Used by [facility]) 0 2018  8:00 AM   Date Primary Tubing Changed 18  5:18 AM   Date Secondary Tubing Changed 18  5:18 AM   NEXT Primary Tubing Change  18  5:18 AM   NEXT Secondary Tubing Change  18  5:18 AM       Central Line Group Right;Internal Jugular Triple Lumen (Active)   Line Secured Sutured in Place;Transparent 2018  8:00 AM   Patency and Function Check Performed at Beginning of Shift 2018  8:00 AM   Line Necessity Assessed Antibiotic Therapy Greater than 7 Days 2018  8:00 AM   Consider Removal of Femoral Line Not Applicable 2018  8:00 AM   Closed Tubing Set Up Yes 2018  8:00 AM   Hand Washing / Gloves Prior to Every Access Yes 2018  8:00 AM   Next Daily Chlorhexidine Bath Due (Regional ONLY) 18  8:00 AM   Port Access  Scrub the Hub Prior to Access;Port Accessed;Blood Return  2018  8:00 AM   Site Condition / Description Assessed;Patent;Clean;Intact;Dry 2018  8:00 AM   Signs and Symptoms of Infection None Apparent at this Time 2018  8:00 AM   Dressing Type / Description Antimicrobial Patch (BioPatch);Transparent;Clean;Dry;Intact;Tegaderm Advanced 2018  8:00 AM   Dressing Status Observed 2018   8:00 AM   Next Dressing Change  01/27/18 1/24/2018  8:00 AM   Date Primary Tubing Changed 01/23/18 1/24/2018  8:00 AM   Date Secondary Tubing Changed 01/24/18 1/24/2018  8:00 AM   NEXT Primary Tubing Change  01/27/18 1/24/2018  8:00 AM   NEXT Secondary Tubing Change  01/25/18 1/24/2018  8:00 AM   Date IV Connector(s) Changed 01/23/18 1/24/2018  8:00 AM   NEXT IV Connector(s) Change Date 01/27/18 1/24/2018  8:00 AM   Waveform Not Applicable 1/24/2018  8:00 AM   Line Calibrated Not Applicable 1/24/2018  8:00 AM     Fluids:  Intake/Output       01/22/18 0700 - 01/23/18 0659 01/23/18 0700 - 01/24/18 0659 01/24/18 0700 - 01/25/18 0659      [id number] 8109-7843 Total 4868-8479 2467-9984 Total 0380-4725 6074-8628 Total       Intake    P.O.  430  90 520  450  230 680  0  -- 0    P.O. 430 90 520 450 230 680 0 -- 0    I.V.  1569.6  1569.6 3139.2  1449.6  1449.6 2899.2  966.4  -- 966.4    IV Volume (NS) 1200 1200 2400 1200 1200 2400 800 -- 800    IV Volume (zosyn) 249.6 249.6 499.2 249.6 249.6 499.2 166.4 -- 166.4    IV Volume (NS TKO) 120 120 240 -- -- -- -- -- --    Other  --  30 30  100  330 430  300  -- 300    Medications (P.O./ Enteral Liquids) -- 30 30 100 330 430 300 -- 300    Enteral  --  90 90  440  920 1360  960  -- 960    Enteral Volume -- 50 50  600 -- 600    Free Water / Tube Flush -- 40 40 90 170 260 360 -- 360    Total Intake 1999.6 1779.6 3779.2 2439.6 2929.6 5369.2 2226.4 -- 2226.4       Output    Urine  825  900 1725  800  900 1700  1900  -- 1900    Number of Times Voided 5 x 2 x 7 x 1 x 2 x 3 x 6 x -- 6 x    Void (ml)   1900 -- 1900    Emesis  450  0 450  --  -- --  --  -- --    Emesis 450 0 450 -- -- -- -- -- --    Emesis - Number of Times 6 x -- 6 x -- -- -- -- -- --    Drains  --  0 0  --  0 0  --  -- --    Residual Amount (ml) (Discarded) -- 0 0 -- 0 0 -- -- --    Stool/Urine  --  0 0  200  50 250  --  -- --    Measurable Stool (ml) -- 0 0 200 50 250 -- -- --    Stool   --  -- --  --  -- --  --  -- --    Number of Times Stooled -- 1 x 1 x 2 x 1 x 3 x 2 x -- 2 x    Total Output 9038 736 9169 1762 667 2294 1900 -- 1900       Net I/O     724.6 879.6 1604.2 1439.6 1979.6 3419.2 326.4 -- 326.4        Weight: 85.4 kg (188 lb 4.4 oz)  GI/Nutrition:  Orders Placed This Encounter   Procedures   • Diet Order     Standing Status:   Standing     Number of Occurrences:   1     Order Specific Question:   Diet:     Answer:   Regular [1]     Medications:  Current Facility-Administered Medications   Medication Last Dose   • methylnaltrexone (RELISTOR) injection 12 mg 12 mg at 01/24/18 1102   • potassium phosphates 30 mmol in D5W 500 mL ivpb 30 mmol at 01/24/18 1626   • cefTRIAXone (ROCEPHIN) syringe 2 g 2 g at 01/24/18 1350   • HYDROmorphone (DILAUDID) injection 1 mg 1 mg at 01/24/18 1604   • oxycodone immediate-release (ROXICODONE) tablet 5 mg      Or   • oxycodone immediate release (ROXICODONE) tablet 10 mg 10 mg at 01/24/18 1355   • insulin glargine (LANTUS) injection 10 Units     • thiamine (THIAMINE) tablet 100 mg 100 mg at 01/24/18 0828   • enoxaparin (LOVENOX) inj 40 mg 40 mg at 01/24/18 0827   • ipratropium-albuterol (DUONEB) nebulizer solution 3 mL 3 mL at 01/20/18 0439   • trazodone (DESYREL) tablet 50 mg 50 mg at 01/23/18 2119   • tiotropium (SPIRIVA) 18 MCG inhalation capsule 1 Cap 1 Cap at 01/24/18 0829   • tacrolimus (PROGRAF) capsule 1.5 mg 1.5 mg at 01/24/18 0828   • sertraline (ZOLOFT) tablet 100 mg 100 mg at 01/23/18 2120   • pravastatin (PRAVACHOL) tablet 20 mg 20 mg at 01/24/18 0828   • omeprazole (PRILOSEC) capsule 40 mg 40 mg at 01/24/18 0828   • mycophenolate (CELLCEPT) capsule 500 mg 500 mg at 01/24/18 0828   • levothyroxine (SYNTHROID) tablet 137 mcg 137 mcg at 01/24/18 0606   • aspirin EC (ECOTRIN) tablet 81 mg 81 mg at 01/24/18 0827   • senna-docusate (PERICOLACE or SENOKOT S) 8.6-50 MG per tablet 2 Tab 2 Tab at 01/24/18 0828    And   • polyethylene glycol/lytes (MIRALAX)  PACKET 1 Packet 1 Packet at 01/20/18 1523    And   • magnesium hydroxide (MILK OF MAGNESIA) suspension 30 mL      And   • bisacodyl (DULCOLAX) suppository 10 mg     • Respiratory Care per Protocol     • NS infusion     • NS (BOLUS) infusion 500 mL     • insulin regular (HUMULIN R) injection 2-9 Units 3 Units at 01/24/18 1150    And   • glucose 4 g chewable tablet 16 g      And   • dextrose 50% (D50W) injection 25 mL     • ondansetron (ZOFRAN) syringe/vial injection 4 mg 4 mg at 01/24/18 1232   • ondansetron (ZOFRAN ODT) dispertab 4 mg     • promethazine (PHENERGAN) tablet 12.5-25 mg 25 mg at 01/24/18 1604   • promethazine (PHENERGAN) suppository 12.5-25 mg 25 mg at 01/23/18 0018   • prochlorperazine (COMPAZINE) injection 5-10 mg 10 mg at 01/23/18 0408   • ambrisentan (LETAIRIS) TABS 10 mg 10 mg at 01/24/18 0827   • alprazolam (XANAX) tablet 1 mg 1 mg at 01/24/18 1604   • Linaclotide CAPS 1 Cap 1 Cap at 01/24/18 0827   • predniSONE (DELTASONE) tablet 7 mg 7 mg at 01/24/18 0828   • Tadalafil (PAH) TABS 40 mg 40 mg at 01/23/18 2100     Medical Decision Making, by Problem:  Active Hospital Problems    Diagnosis   • *Sepsis (CMS-HCC) [A41.9]   • CAP (community acquired pneumonia) [J18.9]   • IRMA (acute kidney injury) (CMS-HCC) [N17.9]   • Acute on chronic respiratory failure with hypoxia (CMS-HCC) [J96.21]   • Hypothyroidism, adult [E03.9]   • Status post liver transplant Dr. [place] ([facility]) [Z94.4]   • Thrombocytopenia (CMS-HCC) [D69.6]   • Drug-induced constipation [K59.03]   • IDDM (insulin dependent diabetes mellitus) (CMS-HCC) [E11.9, Z79.4]       Plan:  1.  Severe sepsis, likely secondary to pulmonary source.  2.  Acute Bilateral Lower lobe community acquired pneumonia  3.  Acute-on-chronic hypoxic respiratory failure.  4.  Severe pulmonary arterial hypertension.  5.  Liver disease, status post liver transplant -- immunosuppressed.  6.  Sarcoidosis.  7.  Hepatorenal syndrome.  8.  Stage III chronic kidney  disease.  9.  Insulin-dependent diabetes mellitus -- controlled.     PLAN:   1. Serially remove abx.  Off linezolid and azithromycin.  Mycoplasma IGM neg.  2. Abdominal pain curious as well as nausea. May be pain meds. Seems to require a lot. Cortrak already in place. TFs too high?  Cannot do a CT scan of the abdomen as she has met her limit for CT scans per Radiology. Will check Abdominal Xray.  3. Stop zosyn - no sputum to acquire for sample. Start ceftriaxone 2g IV Q24 h. Probably Strep or MSSA. MRSA nasal swab negative.  4. Monitor SIRS  5. Viral panel negative.  6. Continue pred at current dose  7. Ok to go to floor from ID perspective.     The case was discussed with patient, , Dr Sheridan, Dr James and RN    >35 min >50% of time spent in coordination of care/counseling and med changes today..    Dr Boston

## 2018-01-25 NOTE — CARE PLAN
Problem: Pain Management  Goal: Pain level will decrease to patient's comfort goal    Intervention: Follow pain managment plan developed in collaboration with patient and Interdisciplinary Team  Pain assessed and patient medicated per the MAR       Problem: Safety  Goal: Will remain free from injury  Outcome: PROGRESSING AS EXPECTED  Bed locked and in low position with brake set. Treaded slipper socks on patient and call light within reach. Patient using call light appropriately for assistance. Patient in room near nurses station.

## 2018-01-25 NOTE — PROGRESS NOTES
Patient transported back to [facility] with transport tech from X-ray on 5L oxygen via gurney. Patient settled in room and all needs met.

## 2018-01-25 NOTE — PROGRESS NOTES
[facility] Hospitalist Progress Note    Date of Service: 2018    Chief Complaint  57 y.o. female admitted 2018 with pneumonia and respiratory failure.  Patient has history of liver transplant chronically on 5 L of oxygen    Interval Problem Update  Still complaining of mid and left upper quadrant abdominal pain requesting IV Dilaudid every 2 hours tolerating tube feeding however is still having nausea    Consultants/Specialty  Critical Care. I discussed her condition with Dr. Sheridan on ICU Hot Rounds.  ID.     Disposition  ICU        Review of Systems   Constitutional: Negative for chills and fever.   HENT: Negative for congestion and sinus pain.    Respiratory: Positive for cough and shortness of breath (improved back to baseline). Negative for hemoptysis and wheezing.    Cardiovascular: Positive for leg swelling. Negative for chest pain, palpitations and orthopnea.   Gastrointestinal: Positive for abdominal pain and nausea. Negative for blood in stool, diarrhea and melena.   Genitourinary: Negative for dysuria and hematuria.   Musculoskeletal: Positive for joint pain.   Skin: Negative for rash.   Neurological: Positive for weakness (generalized). Negative for dizziness, speech change, focal weakness and seizures.   Psychiatric/Behavioral: Negative for hallucinations. The patient is not nervous/anxious.    All other systems reviewed and are negative.     Physical Exam  Laboratory/Imaging   Hemodynamics  Temp (24hrs), Av.1 °C (98.8 °F), Min:36.5 °C (97.7 °F), Max:37.9 °C (100.2 °F)   Temperature: 37.8 °C (100 °F)  Pulse  Av.6  Min: 61  Max: 105 Heart Rate (Monitored): 77  NIBP: 144/73      Respiratory      Respiration: 17, Pulse Oximetry: 93 %     Work Of Breathing / Effort: Tachypnea  RUL Breath Sounds: Clear, RML Breath Sounds: Clear, RLL Breath Sounds: Diminished, MANJINDER Breath Sounds: Clear, LLL Breath Sounds: Diminished    Fluids    Intake/Output Summary (Last 24 hours) at 18 1642  Last data  filed at 01/24/18 1400   Gross per 24 hour   Intake           5497.6 ml   Output             3350 ml   Net           2147.6 ml       Nutrition  Orders Placed This Encounter   Procedures   • Diet Order     Standing Status:   Standing     Number of Occurrences:   1     Order Specific Question:   Diet:     Answer:   Regular [1]     Physical Exam   Constitutional: She is oriented to person, place, and time. She appears well-developed. No distress.   HENT:   Head: Normocephalic and atraumatic.   Right Ear: External ear normal.   Left Ear: External ear normal.   Eyes: Conjunctivae are normal. Right eye exhibits no discharge. Left eye exhibits no discharge.   Neck: Neck supple. No JVD present. No tracheal deviation present.   Right IJ central line   Cardiovascular: Normal rate and regular rhythm.    No murmur heard.  Pulmonary/Chest: No stridor. No respiratory distress. She has rales.   Decreased air movement   Abdominal: She exhibits distension. There is tenderness (mild diffuse). There is no rebound and no guarding.   Musculoskeletal: She exhibits edema.   Neurological: She is alert and oriented to person, place, and time. No cranial nerve deficit. She exhibits normal muscle tone.   Skin: Skin is warm and dry. She is not diaphoretic. There is pallor.   Psychiatric: She has a normal mood and affect. Her behavior is normal. Thought content normal.   Nursing note and vitals reviewed.      Recent Labs      01/22/18   0500  01/23/18   0420  01/24/18   0400   WBC  3.4*  3.5*  5.4   RBC  3.54*  3.82*  3.99*   HEMOGLOBIN  10.2*  10.9*  11.4*   HEMATOCRIT  31.3*  33.6*  34.5*   MCV  88.4  88.0  86.5   MCH  28.8  28.5  28.6   MCHC  32.6*  32.4*  33.0*   RDW  45.1  43.9  43.9   PLATELETCT  55*  61*  69*   MPV  9.9  9.6  9.9     Recent Labs      01/22/18   0500  01/23/18   0420  01/24/18   0400   SODIUM  142  140  140   POTASSIUM  3.4*  3.6  3.4*   CHLORIDE  112  113*  113*   CO2  24  16*  20   GLUCOSE  102*  169*  201*   BUN  7*  9   9   CREATININE  0.67  0.76  0.73   CALCIUM  7.7*  8.0*  8.0*                      Assessment/Plan     * Sepsis (CMS-HCC)- (present on admission)   Assessment & Plan    Secondary to pneumonia with admission to the ICU.  She is immunosuppressed due to liver transplantation.     Sepsis has resolved        CAP (community acquired pneumonia)- (present on admission)   Assessment & Plan    Antibiotics de-escalated to ceftriaxone  Discussed with Dr. [place]  Follow-up on cultures        IRMA (acute kidney injury) (CMS-HCC)- (present on admission)   Assessment & Plan    resolved        Acute on chronic respiratory failure with hypoxia (CMS-HCC)- (present on admission)   Assessment & Plan    Improved requirements back to home baseline oxygen of 5l NC          Electrolyte abnormality   Assessment & Plan    Replete potassium and phosphorus        Left upper quadrant pain   Assessment & Plan    Abdominal exam is benign  Check x-ray has had multiple CTs recently        Chronic pain   Assessment & Plan    With narcotic dependence  Try to taper off IV narcotics with decreased Dilaudid 2 every 4 hours and minimize sedating agents        Thrombocytopenia (CMS-HCC)- (present on admission)   Assessment & Plan    Chronic  Counts are stable continue to monitor        Drug-induced constipation- (present on admission)   Assessment & Plan     resumed her home dose of relistor         IDDM (insulin dependent diabetes mellitus) (CMS-HCC)- (present on admission)   Assessment & Plan    Uncontrolled with hyperglycemia  Start Lantus 10 units  Continue ISS and monitor cbg's        Hypothyroidism, adult- (present on admission)   Assessment & Plan    Synthroid 137 mcg.        Status post liver transplant Dr. [place] ([facility])- (present on admission)   Assessment & Plan    Patient chronically immunosuppressed, continue home dose of Prograf prednisone and CellCept.            Reviewed items::  Labs reviewed and Medications reviewed  DVT prophylaxis  pharmacological::  Contraindicated - High bleeding risk  Antibiotics:  Treating active infection/contamination beyond 24 hours perioperative coverage      Plan of care reviewed with the patient and her  and discuss with ID and critical care

## 2018-01-25 NOTE — PROGRESS NOTES
[facility] Hospitalist Progress Note    Date of Service: 2018    Chief Complaint  57 y.o. female admitted 2018 with pneumonia and respiratory failure.  Patient has history of liver transplant chronically on 5 L of oxygen    Interval Problem Update    Frustrated about still being in the ICU is no available beds on the medical floor  Requesting to go home  Very poor intake    Consultants/Specialty  Critical Care. I discussed her condition with Dr. Sheridan on ICU Hot Rounds.  ID.     Disposition  ICU        Review of Systems   Constitutional: Negative for chills and fever.   HENT: Negative.    Eyes: Negative for discharge and redness.   Respiratory: Positive for cough. Negative for hemoptysis, shortness of breath and wheezing.    Cardiovascular: Negative for chest pain, palpitations and orthopnea.   Gastrointestinal: Positive for abdominal pain, constipation and nausea. Negative for blood in stool, diarrhea and melena.   Genitourinary: Negative for dysuria and hematuria.   Musculoskeletal: Positive for joint pain. Negative for falls.   Skin: Negative for itching and rash.   Neurological: Negative for dizziness, speech change, focal weakness, seizures and headaches.   Psychiatric/Behavioral: Negative for hallucinations. The patient is not nervous/anxious.    All other systems reviewed and are negative.     Physical Exam  Laboratory/Imaging   Hemodynamics  Temp (24hrs), Av.3 °C (99.1 °F), Min:36.6 °C (97.9 °F), Max:37.7 °C (99.9 °F)   Temperature: 37.6 °C (99.7 °F)  Pulse  Av.2  Min: 61  Max: 105 Heart Rate (Monitored): 69  NIBP: 153/69      Respiratory      Respiration: 19, Pulse Oximetry: 97 %     Work Of Breathing / Effort: Tachypnea;Shallow  RUL Breath Sounds: Clear, RML Breath Sounds: Clear, RLL Breath Sounds: Diminished, MANJINDER Breath Sounds: Clear, LLL Breath Sounds: Diminished    Fluids    Intake/Output Summary (Last 24 hours) at 18 1545  Last data filed at 18 1400   Gross per 24 hour    Intake             4910 ml   Output             6325 ml   Net            -1415 ml       Nutrition  Orders Placed This Encounter   Procedures   • Diet Order     Standing Status:   Standing     Number of Occurrences:   1     Order Specific Question:   Diet:     Answer:   Regular [1]     Physical Exam   Constitutional: She is oriented to person, place, and time. She appears well-developed. No distress.   HENT:   Head: Normocephalic and atraumatic.   Mouth/Throat: No oropharyngeal exudate.   Eyes: Conjunctivae are normal. Right eye exhibits no discharge. Left eye exhibits no discharge.   Neck: Neck supple. No JVD present. No tracheal deviation present.   Right IJ central line   Cardiovascular: Normal rate and regular rhythm.    No murmur heard.  Pulmonary/Chest: No respiratory distress. She has decreased breath sounds. She has no wheezes. She has no rales. She exhibits no tenderness.   Abdominal: She exhibits distension. There is tenderness (mild diffuse). There is no rebound and no guarding.   Musculoskeletal: She exhibits edema.   Neurological: She is alert and oriented to person, place, and time. No cranial nerve deficit. She exhibits normal muscle tone.   Skin: Skin is warm and dry. She is not diaphoretic. No erythema.   Psychiatric:   Anxious   Nursing note and vitals reviewed.      Recent Labs      01/23/18   0420  01/24/18   0400  01/25/18   0423   WBC  3.5*  5.4  4.9   RBC  3.82*  3.99*  3.81*   HEMOGLOBIN  10.9*  11.4*  10.9*   HEMATOCRIT  33.6*  34.5*  31.9*   MCV  88.0  86.5  83.7   MCH  28.5  28.6  28.6   MCHC  32.4*  33.0*  34.2   RDW  43.9  43.9  42.5   PLATELETCT  61*  69*  62*   MPV  9.6  9.9  9.2     Recent Labs      01/23/18   0420  01/24/18   0400  01/25/18   0423   SODIUM  140  140  136   POTASSIUM  3.6  3.4*  3.3*   CHLORIDE  113*  113*  106   CO2  16*  20  23   GLUCOSE  169*  201*  146*   BUN  9  9  7*   CREATININE  0.76  0.73  0.70   CALCIUM  8.0*  8.0*  8.4                      Assessment/Plan      * Sepsis (CMS-HCC)- (present on admission)   Assessment & Plan    Secondary to pneumonia with admission to the ICU.  She is immunosuppressed due to liver transplantation.     Sepsis has resolved        CAP (community acquired pneumonia)- (present on admission)   Assessment & Plan    Antibiotics de-escalated to omnicef  ID following  Cx neg        IRMA (acute kidney injury) (CMS-HCC)- (present on admission)   Assessment & Plan    resolved        Acute on chronic respiratory failure with hypoxia (CMS-HCC)- (present on admission)   Assessment & Plan    Improved requirements back to home baseline oxygen of 5l NC          Nausea   Assessment & Plan    Stop IV narcotics  Continue antiemetics  Will hold few feeding and assess oral  intake        Electrolyte abnormality   Assessment & Plan    Replete potassium  Magnesium and phosphorus        Left upper quadrant pain   Assessment & Plan    Abdominal exam is benign   x-ray negative  has had multiple CTs recently        Chronic pain   Assessment & Plan    With narcotic dependence  D/c  IV narcotics as a suspect they are contributing to her nausea and concern for drug-seeking behavior        Thrombocytopenia (CMS-HCC)- (present on admission)   Assessment & Plan    Chronic  Counts 62k overall are stable continue to monitor        Drug-induced constipation- (present on admission)   Assessment & Plan    On  home dose of relistor         IDDM (insulin dependent diabetes mellitus) (CMS-HCC)- (present on admission)   Assessment & Plan    uncontrolled with hyperglycemia  Continue Lantus and ISS and monitor cbg's        Hypothyroidism, adult- (present on admission)   Assessment & Plan    Synthroid 137 mcg.        Status post liver transplant Dr. [place] ([facility])- (present on admission)   Assessment & Plan    Patient chronically immunosuppressed, continue home dose of Prograf prednisone and CellCept.            Reviewed items::  Labs reviewed and Medications reviewed  DVT  prophylaxis pharmacological::  Contraindicated - High bleeding risk  Antibiotics:  Treating active infection/contamination beyond 24 hours perioperative coverage      Plan of care reviewed with patient and discussed with nursing staff

## 2018-01-25 NOTE — PROGRESS NOTES
Tube feeding off at this time. PO intake of food encouraged but patient refusing at this time r/t nausea.

## 2018-01-25 NOTE — PROGRESS NOTES
Sofía from Lab called with critical result of Phosphorus <1 at 0816. Critical lab result read back to Sofía.   Dr. Sheridan will be notified of critical lab result in hot rounds this morning.

## 2018-01-25 NOTE — PROGRESS NOTES
Pulmonary Critical Care Progress Note        DOS:  1/25/2018, admit 1/20/2018    Chief Complaint: SOB/cough/abdominal pain    History of Present Illness:   This is a pleasant 57-year-old female with a complicated past medical history including sarcoidosis resulting in liver failure, status post transplant in 2011 with associated microaspiration, pulmonary arterial hypertension and oxygen dependence on 5 liter per minute nasal cannula 24 hours a day, followed by [facility] who presented to   the emergency department complaining of 3-4 days of progressive worsening shortness of breath, productive cough, chills, abdominal pain and constipation.  She was evaluated in the emergency department and found to have evidence of sepsis as well as bilateral lower lobe pneumonia and underwent CT imaging, which did not show pulmonary embolism nor aneurysm.  She was   initially hypotensive, but responsive to IV fluids and is requiring oxygen in addition to her baseline for acute hypoxemia.  She is being admitted to the intensive care unit and I am consulted for assistance in her management.    Review of Systems   Constitutional: Positive for diaphoresis and malaise/fatigue. Negative for chills and fever.   HENT: Negative for congestion and sore throat.    Eyes: Negative.    Respiratory: Negative for cough, hemoptysis, sputum production, shortness of breath and wheezing.    Cardiovascular: Negative for chest pain, orthopnea and PND.   Gastrointestinal: Positive for abdominal pain, constipation, nausea and vomiting. Negative for diarrhea.   Genitourinary: Negative.    Musculoskeletal: Negative.    Neurological: Positive for focal weakness and weakness. Negative for sensory change, speech change and headaches.   Endo/Heme/Allergies: Negative.    Psychiatric/Behavioral: The patient is nervous/anxious and has insomnia.    pt complains of nausea and abdominal pain.  No vomiting.  Really wants her relistor    Interval Events:  24  hour interval history reviewed    - no events overnight   - still abdominal pain   - using her own reslistor and having BMs daily   - SR 60-70s   - SBPs 140-150s   - tolerating TFs at goal   - mobilizing to chair and commode   - feels pain is better controlled   - no respiratory issues   - No CXR today   - zosyn changed to cefdinir    Yesterday's Events:   - no events overnight   - still complains of abdominal pain   - wants to use her own Relistor   - getting dilaudid every 2 hours   - SR 70-80s   - -150s   - TFs at goal   - still has nausea, but no vomiting   - up to commode to urinate at 800cc overnight   - zosyn at continuous infusion   - phosphorus < 1   - no CXR today   - no respiratory issues    PFSH:  No change.    General: pleasant/cooperative, appears brighter and wanting to go home, less toxic in appearance, chronically ill in appearance  Skin: warm/dry, no rashes (no change)    Respiratory:     Pulse Oximetry: 97 %  HF NC O2  -> NC O2          Exam: right base crackles that clear with cough, clear throughout otherwise, no chest wall tenderness  Imaging: reviewed with team during rounds    CTA chest 1/20:  1.  No thoracic or abdominal aortic aneurysm or dissection.  2.  Findings consistent with pulmonary hypertension.  3.  No CT evidence for pulmonary embolus.  4.  Bilateral lower lobe consolidation, most concerning for pneumonia.  5.  Splenomegaly with splenic cyst again present.  6.  Increased colonic stool.    HemoDynamics:  Pulse: 65, Heart Rate (Monitored): 64  NIBP: 158/79       Exam: RRR, no murmurs, trace to 1+ pedal edema, 2+ dpp=, good cap refill (no change)  Imaging: Available data reviewed   ECHO 1/31/2017  CONCLUSIONS  Normal left ventricular size, thickness, systolic function, and diastolic function.  Left ventricular ejection fraction is visually estimated to be 65%.  The right ventricle was normal in size and function.  Moderate mitral regurgitation.  Moderate aortic  insufficiency.  Compared to the images of the prior study done 05/24/2016.-  mitral   regurgitation and aortic regurgitation have increased, previously mild.            Neuro:  GCS  15       Exam: clear speech, speaking in full sentences, cn ii-xii grossly intact, motor/sensory intact, no focal deficits  Imaging: Available data reviewed    Fluids:  Intake/Output       01/23/18 0700 - 01/24/18 0659 01/24/18 0700 - 01/25/18 0659 01/25/18 0700 - 01/26/18 0659      0700-1859 1900-0659 Total 0700-1859 1900-0659 Total 0700-1859 1900-0659 Total       Intake    P.O.  450  230 680  0  -- 0  --  -- --    P.O. 450 230 680 0 -- 0 -- -- --    I.V.  1449.6  1449.6 2899.2  1449.6  1000 2449.6  --  -- --    IV Volume (NS) 1200 1200 2400 1200 1000 2200 -- -- --    IV Volume (zosyn) 249.6 249.6 499.2 249.6 -- 249.6 -- -- --    Other  100  330 430  330  210 540  --  -- --    Medications (P.O./ Enteral Liquids) 100 330 430 330 210 540 -- -- --    Enteral  440  920 1360  1350  740 2090  --  -- --    Enteral Volume   -- -- --    Free Water / Tube Flush 90 170 260 450 90 540 -- -- --    Total Intake 2439.6 2929.6 5369.2 3129.6 1950 5079.6 -- -- --       Output    Urine  800  900 1700  2200  2250 4450  --  -- --    Number of Times Voided 1 x 2 x 3 x 7 x 5 x 12 x -- -- --    Void (ml)  2200 2250 4450 -- -- --    Emesis  --  -- --  --  25 25  --  -- --    Emesis -- -- -- -- 25 25 -- -- --    Emesis - Number of Times -- -- -- -- 1 x 1 x -- -- --    Drains  --  0 0  --  0 0  --  -- --    Residual Amount (ml) (Discarded) -- 0 0 -- 0 0 -- -- --    Stool/Urine  200  50 250  --  150 150  --  -- --    Measurable Stool (ml) 200 50 250 -- 150 150 -- -- --    Stool  --  -- --  --  -- --  --  -- --    Number of Times Stooled 2 x 1 x 3 x 3 x 2 x 5 x -- -- --    Total Output 6633 229 3882 2200 2426 1630 -- -- --       Net I/O     1439.6 1979.6 3419.2 929.6 -475 454.6 -- -- --           Recent Labs      01/23/18   0420   18   0400   SODIUM  140  140   POTASSIUM  3.6  3.4*   CHLORIDE  113*  113*   CO2  16*  20   BUN  9  9   CREATININE  0.76  0.73   MAGNESIUM  1.6  1.5   PHOSPHORUS  2.0*  <1.0*   CALCIUM  8.0*  8.0*       GI/Nutrition:  Exam: hypoactive bowel sounds, soft, distended, no masses, no tenderness (no changes)  Imaging: Available data reviewed  taking PO  Liver Function  Recent Labs      18   0420  18   040   ALTSGPT  25  17   ASTSGOT  17  13   ALKPHOSPHAT  24*  22*   TBILIRUBIN  0.5  0.4   GLUCOSE  169*  201*       Heme:  Recent Labs      18   04218   0400  18   0423   RBC  3.82*  3.99*  3.81*   HEMOGLOBIN  10.9*  11.4*  10.9*   HEMATOCRIT  33.6*  34.5*  31.9*   PLATELETCT  61*  69*  62*       Infectious Disease:  Temp  Av.3 °C (99.2 °F)  Min: 36.6 °C (97.9 °F)  Max: 37.9 °C (100.2 °F)  Micro:reviewed  Recent Labs      18   04218   0400  18   0423   WBC  3.5*  5.4  4.9   NEUTSPOLYS  86.20*  78.60*  72.40*   LYMPHOCYTES  8.60*  9.70*  13.60*   MONOCYTES  4.60  8.80  9.70   EOSINOPHILS  0.00  1.80  3.30   BASOPHILS  0.00  0.40  0.20   ASTSGOT  17  13   --    ALTSGPT  25  17   --    ALKPHOSPHAT  24*  22*   --    TBILIRUBIN  0.5  0.4   --      Current Facility-Administered Medications   Medication Dose Frequency Provider Last Rate Last Dose   • methylnaltrexone (RELISTOR) injection 12 mg  12 mg QDAY PRN Riley Marte M.D.   12 mg at 18 1102   • cefTRIAXone (ROCEPHIN) syringe 2 g  2 g Q24HRS Silviano Boston M.D.   2 g at 18 1350   • HYDROmorphone (DILAUDID) injection 1 mg  1 mg Q4HRS PRN Riley Marte M.D.   1 mg at 18 0416   • oxycodone immediate-release (ROXICODONE) tablet 5 mg  5 mg Q6HRS PRWALTER Marte M.D.        Or   • oxycodone immediate release (ROXICODONE) tablet 10 mg  10 mg Q6HRS PRWALTER Marte M.D.   10 mg at 18 4870   • insulin glargine (LANTUS) injection 10 Units  10 Units Q EVENING Riley  PANCHO Marte M.D.   10 Units at 01/24/18 2056   • thiamine (THIAMINE) tablet 100 mg  100 mg DAILY Josemanuel Real M.D.   100 mg at 01/24/18 0828   • enoxaparin (LOVENOX) inj 40 mg  40 mg DAILY Josemanuel Real M.D.   40 mg at 01/24/18 0827   • ipratropium-albuterol (DUONEB) nebulizer solution 3 mL  3 mL Q4H PRN (RT) Terry Bob M.D.   3 mL at 01/20/18 0439   • trazodone (DESYREL) tablet 50 mg  50 mg QHS Racquel Oleary M.D.   50 mg at 01/24/18 2053   • tiotropium (SPIRIVA) 18 MCG inhalation capsule 1 Cap  1 Cap DAILY Racquel Oleary M.D.   1 Cap at 01/24/18 0829   • tacrolimus (PROGRAF) capsule 1.5 mg  1.5 mg BID Racquel Oleary M.D.   1.5 mg at 01/24/18 2055   • sertraline (ZOLOFT) tablet 100 mg  100 mg QHS Racquel Oleary M.D.   100 mg at 01/24/18 2051   • pravastatin (PRAVACHOL) tablet 20 mg  20 mg DAILY Racquel Oleary M.D.   20 mg at 01/24/18 0828   • omeprazole (PRILOSEC) capsule 40 mg  40 mg DAILY Racquel Oleary M.D.   40 mg at 01/24/18 0828   • mycophenolate (CELLCEPT) capsule 500 mg  500 mg BID Racquel Oleary M.D.   500 mg at 01/24/18 2054   • levothyroxine (SYNTHROID) tablet 137 mcg  137 mcg AM ES Racquel Oleary M.D.   137 mcg at 01/24/18 0606   • aspirin EC (ECOTRIN) tablet 81 mg  81 mg DAILY Racquel Oleary M.D.   81 mg at 01/24/18 0827   • senna-docusate (PERICOLACE or SENOKOT S) 8.6-50 MG per tablet 2 Tab  2 Tab BID Racquel Oleary M.D.   Stopped at 01/24/18 2100    And   • polyethylene glycol/lytes (MIRALAX) PACKET 1 Packet  1 Packet QDAY PRN Racquel Oleary M.D.   1 Packet at 01/20/18 1523    And   • magnesium hydroxide (MILK OF MAGNESIA) suspension 30 mL  30 mL QDAY PRN Racquel Oleary M.D.        And   • bisacodyl (DULCOLAX) suppository 10 mg  10 mg QDAY PRN Racquel Oleary M.D.       • Respiratory Care per Protocol   Continuous RT Racquel Oleary M.D.       • NS infusion   Continuous Racquel Oleary M.D. 100 mL/hr at 01/25/18 0012     • NS (BOLUS) infusion 500 mL  500 mL Once PRN Racquel COVARRUBIAS  CLINT Oleary       • insulin regular (HUMULIN R) injection 2-9 Units  2-9 Units Q6HRS Racquel Oleary M.D.   2 Units at 01/25/18 0019    And   • glucose 4 g chewable tablet 16 g  16 g Q15 MIN PRN Racquel Oleary M.D.        And   • dextrose 50% (D50W) injection 25 mL  25 mL Q15 MIN PRN Racquel Oleary M.D.       • ondansetron (ZOFRAN) syringe/vial injection 4 mg  4 mg Q4HRS PRN Racquel Oleary M.D.   4 mg at 01/25/18 0416   • ondansetron (ZOFRAN ODT) dispertab 4 mg  4 mg Q4HRS PRN Racquel Oleary M.D.       • promethazine (PHENERGAN) tablet 12.5-25 mg  12.5-25 mg Q4HRS PRN Racquel Oleary M.D.   25 mg at 01/24/18 2253   • promethazine (PHENERGAN) suppository 12.5-25 mg  12.5-25 mg Q4HRS PRN Racquel Oleary M.D.   25 mg at 01/23/18 0018   • prochlorperazine (COMPAZINE) injection 5-10 mg  5-10 mg Q4HRS PRN Racquel Oleary M.D.   10 mg at 01/23/18 0408   • ambrisentan (LETAIRIS) TABS 10 mg  10 mg DAILY Jeremy M Gonda, M.D.   10 mg at 01/24/18 0827   • alprazolam (XANAX) tablet 1 mg  1 mg TID PRN Jeremy M Gonda, M.D.   1 mg at 01/24/18 2253   • Linaclotide CAPS 1 Cap  1 Cap DAILY Jeremy M Gonda, M.D.   1 Cap at 01/24/18 0827   • predniSONE (DELTASONE) tablet 7 mg  7 mg DAILY Jeremy M Gonda, M.D.   7 mg at 01/24/18 0828   • Tadalafil (PAH) TABS 40 mg  40 mg QHS Jeremy M Gonda, M.D.   40 mg at 01/24/18 2100     Last reviewed on 1/20/2018  4:56 PM by Colleen Schneider    Quality  Measures:  Medications reviewed, EKG reviewed, Labs reviewed and Radiology images reviewed  Holbrook catheter: No Holbrook  Central line in place: Concentrated IV drugs and Need for access    DVT Prophylaxis: Enoxaparin (Lovenox)  DVT prophylaxis - mechanical: SCDs  Ulcer prophylaxis: Yes          Problems/plan:  Severe sepsis from a pulmonary source.   S/p fluids   S/p sepsis protocols   Healthcare associated pneumonia.   Zosyn/Linezolid-->changed to cefdinir orally today   ID following   Cultures pending  Acute-on-chronic hypoxemic respiratory  failure.   RT protocols   Weaned O2 to baseline at  5 lpm NC   IS/Mobilize  Leukopenia - monitor  Anemia/Thrombocytopenia   Immunosuppressed state.  Liver disease, status post liver transplant.   Prednisone/Mycophenolate/Tacrolimus  H/o Sarcoidosis.  Hepatopulmonary syndrome.  Cardiomegaly - monitor  Severe pulmonary arterial hypertension, controlled on medications.   Continue home regimen - keep sats up with supplemental O2   Ambrisentan/Tadalafil  Stage III chronic kidney disease   Adequate hydration, avoid nephrotoxins, renal dosing meds   Follow UO/renal function   Renal eval PRN  Insulin-dependent diabetes - glycemic control - SSI  Chronic pain - analgesia/mobilize  Constipation - bowel care protocols  Enteral nutrition  Prophylaxis    Pt's exam, assessment, and plan remain grossly unchanged; however, will start orals and changed abx to cefdinir today.  She remains stable for transfer out of the ICU and likely can go home in the next 1-2 days if tolerates POs.    Discussed patient condition and risk of morbidity and/or mortality with Family, RN, RT, Pharmacy, Charge nurse / hot rounds, Patient and hospitalist and infectious disease.    [id number]

## 2018-01-25 NOTE — PROGRESS NOTES
Dr. Arabella Marte at bedside to assess patient. Orders received to stop tube feeding at this time, encourage PO intake of both food and medications. Orders carried out by RN.

## 2018-01-25 NOTE — PROGRESS NOTES
Infectious Disease Progress Note    Author: Silviano Murphypaola Date & Time created: 1/25/2018  10:30 AM    Interval History:  Abx day #5 - Ceftriaxone    Previously received ceftriaxone, unasyn, zosyn    1/19: CT Chest w/o: Previously demonstrated RLL & LLL infiltrates have almost completely cleared.Emphysematous lungs.                                    A 2.9 mm superior segment right lower lobe nodule is probably unchanged.                                    Splenomegaly and 2.7 cm hypodense lesion within the spleen unchanged.  1/20: Blood culture: neg, CXR: Hypoinflation with bibasilar atelectasis and possible superimposed LEFT basilar pneumonia.           CT scan: CTA - No thoracic or abdominal aortic aneurysm or dissection. Findings consistent with pulmonary hypertension.                           No CT evidence for pulmonary embolus.                           Bilateral lower lobe consolidation, most concerning for pneumonia.                           Splenomegaly with splenic cyst again present.                            Increased colonic stool.  1/20: Right IJ placed. MRSA nasal swab negative  1/21: CRP 13.11. Vomiting/Nausea this am. No fevers. Reports spleen hurting. Linezolid stopped.  1/22: Continued nausea. No vomiting. Abdominal discomfort remains. Azithromycin stopped. No fevers. Had BMs.  1/23: Cortrak placed to increase caloric intake.  States still with nausea and abdominal pain.  States cannot take large meals due to previous gastric bypass.  Wants out of unit  1/24: Continued nausea and reported vomiting. No fevers. Continued abdominal pains. \  1/25: Still in ICU.  Per RN waiting for bed.  She wants out of the hospital.    Labs Reviewed, Medications Reviewed, Radiology Reviewed, Wound Reviewed, Fluids Reviewed and GI Nutrition Reviewed    Review of Systems:  Review of Systems   Constitutional: Negative for chills and fever.   Respiratory: Positive for cough. Negative for sputum production.     Cardiovascular: Negative for chest pain.   Gastrointestinal: Positive for abdominal pain and nausea. Negative for diarrhea.   Skin: Negative for rash.   Neurological: Negative for weakness.     Physical Exam:  Physical Exam   Constitutional: She is oriented to person, place, and time. She appears well-developed and well-nourished.   HENT:   Cortrak in place.   Cardiovascular: Normal rate and regular rhythm.    No murmur heard.  Pulmonary/Chest: Effort normal. No respiratory distress. She has no wheezes. She has no rales.   Lungs fairly clear in midfields.  Diminished at bases, but better compared to Tuesday.   Abdominal: Soft. Bowel sounds are normal. There is no tenderness.   Musculoskeletal: Normal range of motion. She exhibits no edema.   Neurological: She is alert and oriented to person, place, and time. No cranial nerve deficit.   Skin: Skin is warm and dry. No rash noted.   Psychiatric: She has a normal mood and affect.       Labs:  Recent Results (from the past 24 hour(s))   ACCU-CHEK GLUCOSE    Collection Time: 01/24/18 11:49 AM   Result Value Ref Range    Glucose - Accu-Ck 249 (H) 65 - 99 mg/dL   ACCU-CHEK GLUCOSE    Collection Time: 01/24/18  5:29 PM   Result Value Ref Range    Glucose - Accu-Ck 221 (H) 65 - 99 mg/dL   ACCU-CHEK GLUCOSE    Collection Time: 01/25/18 12:18 AM   Result Value Ref Range    Glucose - Accu-Ck 200 (H) 65 - 99 mg/dL   CBC with Differential    Collection Time: 01/25/18  4:23 AM   Result Value Ref Range    WBC 4.9 4.8 - 10.8 K/uL    RBC 3.81 (L) 4.20 - 5.40 M/uL    Hemoglobin 10.9 (L) 12.0 - 16.0 g/dL    Hematocrit 31.9 (L) 37.0 - 47.0 %    MCV 83.7 81.4 - 97.8 fL    MCH 28.6 27.0 - 33.0 pg    MCHC 34.2 33.6 - 35.0 g/dL    RDW 42.5 35.9 - 50.0 fL    Platelet Count 62 (L) 164 - 446 K/uL    MPV 9.2 9.0 - 12.9 fL    Neutrophils-Polys 72.40 (H) 44.00 - 72.00 %    Lymphocytes 13.60 (L) 22.00 - 41.00 %    Monocytes 9.70 0.00 - 13.40 %    Eosinophils 3.30 0.00 - 6.90 %    Basophils 0.20  "0.00 - 1.80 %    Immature Granulocytes 0.80 0.00 - 0.90 %    Nucleated RBC 0.00 /100 WBC    Neutrophils (Absolute) 3.53 2.00 - 7.15 K/uL    Lymphs (Absolute) 0.66 (L) 1.00 - 4.80 K/uL    Monos (Absolute) 0.47 0.00 - 0.85 K/uL    Eos (Absolute) 0.16 0.00 - 0.51 K/uL    Baso (Absolute) 0.01 0.00 - 0.12 K/uL    Immature Granulocytes (abs) 0.04 0.00 - 0.11 K/uL    NRBC (Absolute) 0.00 K/uL   ACCU-CHEK GLUCOSE    Collection Time: 01/25/18  6:15 AM   Result Value Ref Range    Glucose - Accu-Ck 169 (H) 65 - 99 mg/dL     Results     Procedure Component Value Units Date/Time    Blood Culture [[id number]] Collected:  01/20/18 0626    Order Status:  Completed Specimen:  Blood from Peripheral Updated:  01/25/18 0900     Significant Indicator NEG     Source BLD     Site PERIPHERAL     Blood Culture No growth after 5 days of incubation.    Narrative:       From different peripheral sites, if not done within the last  24 hours (Per Hospital Policy: Only change specimen source to  \"Line\" if specified by physician order)    Blood Culture [[id number]] Collected:  01/20/18 0203    Order Status:  Completed Specimen:  Blood from Peripheral Updated:  01/25/18 0700     Significant Indicator NEG     Source BLD     Site PERIPHERAL     Blood Culture No growth after 5 days of incubation.    Narrative:       From different peripheral sites, if not done within the last  24 hours (Per Hospital Policy: Only change specimen source to  \"Line\" if specified by physician order)    RESPIRATORY VIRUS PANEL BY PCR [[id number]] Collected:  01/20/18 4425    Order Status:  Completed Specimen:  Other from Nasopharyngeal Updated:  01/23/18 2219     Influenza virus A RNA Not Detected     Influenza virus A H1 RNA Not Detected     Influenza virus A H3 RNA Not Detected     Influenza A 2009, H1N1, PCR Not Detected     Influenza virus B, PCR Not Detected     Resp Syncytial Virus A, PCR Not Detected     Resp Syncytial Virus B, PCR Not Detected     Parainfluenza virus " 1, PCR Not Detected     Parainfluenza virus 2, PCR Not Detected     Parainfluenza virus 3, PCR Not Detected     Human Metapneumovirus, PCR Not Detected     Human Rhinovirus, PCR Not Detected     Adenovirus B/E, PCR Not Detected     Adenovirus C, PCR Not Detected     Comment: Performed by [employer],  [facility] [place] [address] [phone]  [email or web address], Ronnie Kwon MD - Lab. Director         Narrative:       Collected By:[id number] NIMO IBRAHIM    BLOOD CULTURE [[id number]] Collected:  01/21/18 0312    Order Status:  Completed Specimen:  Blood Updated:  01/22/18 0750     Significant Indicator NEG     Source BLD     Site Peripheral     Blood Culture --     No Growth    Note: Blood cultures are incubated for 5 days and  are monitored continuously.Positive blood cultures  are called to the RN and reported as soon as  they are identified.      BLOOD CULTURE [[id number]] Collected:  01/21/18 0312    Order Status:  Completed Specimen:  Blood Updated:  01/22/18 0750     Significant Indicator NEG     Source BLD     Site --     Blood Culture --     No Growth    Note: Blood cultures are incubated for 5 days and  are monitored continuously.Positive blood cultures  are called to the RN and reported as soon as  they are identified.      BLOOD CULTURE [[id number]]     Order Status:  No result Specimen:  Blood from Peripheral     BLOOD CULTURE [[id number]]     Order Status:  No result Specimen:  Blood from Peripheral     BLOOD CULTURE [[id number]]     Order Status:  No result Specimen:  Blood from Peripheral     BLOOD CULTURE [[id number]]     Order Status:  Canceled Specimen:  Blood from Peripheral     BLOOD CULTURE [[id number]]     Order Status:  Canceled Specimen:  Blood from Peripheral     BLOOD CULTURE [[id number]]     Order Status:  Canceled Specimen:  Blood from Peripheral     S. AUREUS BY PCR, NASAL COMPLETE [[id number]] Collected:  01/20/18 3245    Order Status:  Completed Specimen:  Nasal from Other Updated:   18 1716     Staph aureus by PCR Negative     MRSA by PCR Negative    Narrative:       Collected By:[id number] NIMO IBRAHIM    INFLUENZA A/B BY PCR [[id number]] Collected:  18 1035    Order Status:  Completed Specimen:  Nasal from Nasopharyngeal Updated:  18 1117     Influenza virus A RNA Negative     Influenza virus B, PCR Negative    Narrative:       Collected By:[id number] NIMO IBRAHIM    BLOOD CULTURE,HOLD [[id number]] Collected:  18 0623    Order Status:  Completed Updated:  18 0746     Blood Culture Hold Collected    BLOOD CULTURE,HOLD [[id number]] Collected:  18 0203    Order Status:  Completed Updated:  18 0304     Blood Culture Hold Collected    Culture Respiratory W/ GRM STN [[id number]] Collected:  18 0000    Order Status:  Canceled Specimen:  Sputum from Sputum         Hemodynamics:  Temp (24hrs), Av.4 °C (99.3 °F), Min:36.6 °C (97.9 °F), Max:37.9 °C (100.2 °F)  Temperature: 36.9 °C (98.4 °F)  Pulse  Av.7  Min: 61  Max: 105Heart Rate (Monitored): 74  NIBP: 152/65     PIV Group Left Antecubital 18g Saline Lock (Active)   Line Secured Taped;Transparent 2018  8:00 PM   Site Condition / Description Assessed;Patent;Clean;Dry;Intact 2018  8:00 PM   Dressing Type / Description Transparent;Clean;Dry;Intact 2018  8:00 PM   Dressing Status Observed 2018  8:00 PM   Saline Locked Yes 2018  8:00 PM   Infiltration Grading Used by [facility] 0 2018  8:00 PM   Phlebitis Scale (Used by [facility]) 0 2018  8:00 PM   Date Primary Tubing Changed 18  5:18 AM   Date Secondary Tubing Changed 18  5:18 AM   NEXT Primary Tubing Change  18  5:18 AM   NEXT Secondary Tubing Change  18  5:18 AM       Central Line Group Right;Internal Jugular Triple Lumen (Active)   Line Secured Sutured in Place;Transparent 2018  8:00 PM   Patency and Function Check Performed at Beginning of Shift  1/24/2018  8:00 PM   Line Necessity Assessed Antibiotic Therapy Greater than 7 Days 1/24/2018  8:00 PM   Consider Removal of Femoral Line Not Applicable 1/24/2018  8:00 PM   Closed Tubing Set Up Yes 1/24/2018  8:00 PM   Hand Washing / Gloves Prior to Every Access Yes 1/24/2018  8:00 PM   Next Daily Chlorhexidine Bath Due (Regional ONLY) 01/25/18 1/24/2018  8:00 PM   Port Access  Scrub the Hub Prior to Access;Port Accessed;Blood Return ;Port De-Accessed 1/24/2018  8:00 PM   Site Condition / Description Assessed;Patent;Clean;Intact;Dry 1/24/2018  8:00 PM   Signs and Symptoms of Infection None Apparent at this Time 1/24/2018  8:00 PM   Dressing Type / Description Antimicrobial Patch (BioPatch);Transparent;Clean;Dry;Intact 1/24/2018  8:00 PM   Dressing Status Observed 1/24/2018  8:00 PM   Next Dressing Change  01/27/18 1/24/2018  8:00 PM   Date Primary Tubing Changed 01/23/18 1/24/2018  8:00 PM   Date Secondary Tubing Changed 01/24/18 1/24/2018  8:00 PM   NEXT Primary Tubing Change  01/27/18 1/24/2018  8:00 PM   NEXT Secondary Tubing Change  01/25/18 1/24/2018  8:00 PM   Date IV Connector(s) Changed 01/23/18 1/24/2018  8:00 PM   NEXT IV Connector(s) Change Date 01/27/18 1/24/2018  8:00 PM   Waveform Not Applicable 1/24/2018  8:00 PM   Line Calibrated Not Applicable 1/24/2018  8:00 PM     Fluids:  Intake/Output       01/23/18 0700 - 01/24/18 0659 01/24/18 0700 - 01/25/18 0659 01/25/18 0700 - 01/26/18 0659      7330-5617 4536-0527 Total 2383-5866 3593-8107 Total 2664-9314 6373-2057 Total       Intake    P.O.  450  230 680  0  -- 0  --  -- --    P.O. 450 230 680 0 -- 0 -- -- --    I.V.  1449.6  1449.6 2899.2  1366.4  1200 2566.4  --  -- --    IV Volume (NS) 1200 1200 2400 1200 1200 2400 -- -- --    IV Volume (zosyn) 249.6 249.6 499.2 166.4 -- 166.4 -- -- --    Other  100  330 430  330  210 540  --  -- --    Medications (P.O./ Enteral Liquids) 100 330 430 330 210 540 -- -- --    Enteral  440  920 1360  1350  840 2190  --   -- --    Enteral Volume   -- -- --    Free Water / Tube Flush 90 170 260 450 90 540 -- -- --    Total Intake 2439.6 2929.6 5369.2 3046.4 2250 5296.4 -- -- --       Output    Urine  800  900 1700  2200  2650 4850  --  -- --    Number of Times Voided 1 x 2 x 3 x 7 x 6 x 13 x -- -- --    Void (ml)  2200 2650 4850 -- -- --    Emesis  --  -- --  --  25 25  --  -- --    Emesis -- -- -- -- 25 25 -- -- --    Emesis - Number of Times -- -- -- -- 1 x 1 x -- -- --    Drains  --  0 0  --  0 0  --  -- --    Residual Amount (ml) (Discarded) -- 0 0 -- 0 0 -- -- --    Stool/Urine  200  50 250  --  150 150  --  -- --    Measurable Stool (ml) 200 50 250 -- 150 150 -- -- --    Stool  --  -- --  --  -- --  --  -- --    Number of Times Stooled 2 x 1 x 3 x 3 x 2 x 5 x -- -- --    Total Output 6692 485 3725 2200 2825 5025 -- -- --       Net I/O     1439.6 1979.6 3419.2 846.4 -575 271.4 -- -- --           GI/Nutrition:  Orders Placed This Encounter   Procedures   • Diet Order     Standing Status:   Standing     Number of Occurrences:   1     Order Specific Question:   Diet:     Answer:   Regular [1]     Medications:  Current Facility-Administered Medications   Medication Last Dose   • insulin glargine (LANTUS) injection 15 Units     • methylnaltrexone (RELISTOR) injection 12 mg 12 mg at 01/25/18 0829   • cefTRIAXone (ROCEPHIN) syringe 2 g 2 g at 01/24/18 1350   • HYDROmorphone (DILAUDID) injection 1 mg 1 mg at 01/25/18 0828   • oxycodone immediate-release (ROXICODONE) tablet 5 mg      Or   • oxycodone immediate release (ROXICODONE) tablet 10 mg 10 mg at 01/25/18 0608   • thiamine (THIAMINE) tablet 100 mg 100 mg at 01/25/18 1005   • enoxaparin (LOVENOX) inj 40 mg 40 mg at 01/25/18 1004   • ipratropium-albuterol (DUONEB) nebulizer solution 3 mL 3 mL at 01/20/18 0439   • trazodone (DESYREL) tablet 50 mg 50 mg at 01/24/18 2053   • tiotropium (SPIRIVA) 18 MCG inhalation capsule 1 Cap 1 Cap at 01/25/18 1004    • tacrolimus (PROGRAF) capsule 1.5 mg 1.5 mg at 01/25/18 1005   • sertraline (ZOLOFT) tablet 100 mg 100 mg at 01/24/18 2051   • pravastatin (PRAVACHOL) tablet 20 mg 20 mg at 01/25/18 1005   • omeprazole (PRILOSEC) capsule 40 mg 40 mg at 01/25/18 1005   • mycophenolate (CELLCEPT) capsule 500 mg 500 mg at 01/25/18 1005   • levothyroxine (SYNTHROID) tablet 137 mcg 137 mcg at 01/25/18 0639   • aspirin EC (ECOTRIN) tablet 81 mg 81 mg at 01/25/18 1005   • senna-docusate (PERICOLACE or SENOKOT S) 8.6-50 MG per tablet 2 Tab 2 Tab at 01/25/18 1005    And   • polyethylene glycol/lytes (MIRALAX) PACKET 1 Packet 1 Packet at 01/20/18 1523    And   • magnesium hydroxide (MILK OF MAGNESIA) suspension 30 mL      And   • bisacodyl (DULCOLAX) suppository 10 mg     • Respiratory Care per Protocol     • NS infusion     • NS (BOLUS) infusion 500 mL     • insulin regular (HUMULIN R) injection 2-9 Units 2 Units at 01/25/18 0617    And   • glucose 4 g chewable tablet 16 g      And   • dextrose 50% (D50W) injection 25 mL     • ondansetron (ZOFRAN) syringe/vial injection 4 mg 4 mg at 01/25/18 0828   • ondansetron (ZOFRAN ODT) dispertab 4 mg     • promethazine (PHENERGAN) tablet 12.5-25 mg 25 mg at 01/25/18 0608   • promethazine (PHENERGAN) suppository 12.5-25 mg 25 mg at 01/23/18 0018   • prochlorperazine (COMPAZINE) injection 5-10 mg 10 mg at 01/23/18 0408   • ambrisentan (LETAIRIS) TABS 10 mg 10 mg at 01/25/18 1006   • alprazolam (XANAX) tablet 1 mg 1 mg at 01/25/18 1002   • Linaclotide CAPS 1 Cap 1 Cap at 01/25/18 1003   • predniSONE (DELTASONE) tablet 7 mg 7 mg at 01/25/18 1005   • Tadalafil (PAH) TABS 40 mg 40 mg at 01/24/18 2100     Medical Decision Making, by Problem:  Active Hospital Problems    Diagnosis   • *Sepsis (CMS-HCC) [A41.9]   • CAP (community acquired pneumonia) [J18.9]   • IRMA (acute kidney injury) (CMS-HCC) [N17.9]   • Acute on chronic respiratory failure with hypoxia (CMS-HCC) [J96.21]   • Hypothyroidism, adult  [E03.9]   • Status post liver transplant Dr. [place] ([facility]) [Z94.4]   • Thrombocytopenia (CMS-HCC) [D69.6]   • Drug-induced constipation [K59.03]   • IDDM (insulin dependent diabetes mellitus) (CMS-HCC) [E11.9, Z79.4]       Plan:  1.  Severe sepsis, likely secondary to pulmonary source.  2.  Acute Bilateral Lower lobe community acquired pneumonia  3.  Acute-on-chronic hypoxic respiratory failure.  4.  Severe pulmonary arterial hypertension.  5.  Liver disease, status post liver transplant -- immunosuppressed.  6.  Sarcoidosis.  7.  Hepatorenal syndrome.  8.  Stage III chronic kidney disease.  9.  Insulin-dependent diabetes mellitus -- controlled.     PLAN:   1. Serially remove abx.  Off linezolid and azithromycin.  Mycoplasma IGM neg.  2. Abdominal pain curious as well as nausea. May be pain meds. Seems to require a lot. Cortrak already in place. TFs too high?  Cannot do a CT scan of the abdomen as she has met her limit for CT scans per Radiology. Will check Abdominal Xray.  3. On ceftriaxone 2g IV Q24 h. Probably Strep or MSSA. MRSA nasal swab negative.  Will try to transition to oral cephalosporin.  N  4. Monitor SIRS  5. Viral panel negative.  6. Continue pred at current dose  7. Ok to go to floor from ID perspective.  Could possibly be discharged home, although would like to see her ambulate a bit and see if O2 sats are okay.     Discussed with pt and RN    >35 min >50% of time spent in coordination of care/counseling and med changes today..

## 2018-01-26 ENCOUNTER — HOME HEALTH ADMISSION (OUTPATIENT)
Dept: HOME HEALTH SERVICES | Facility: HOME HEALTHCARE | Age: 58
End: 2018-01-26
Payer: MEDICARE

## 2018-01-26 VITALS
WEIGHT: 188.27 LBS | SYSTOLIC BLOOD PRESSURE: 142 MMHG | HEIGHT: 68 IN | HEART RATE: 67 BPM | TEMPERATURE: 99.2 F | RESPIRATION RATE: 17 BRPM | DIASTOLIC BLOOD PRESSURE: 77 MMHG | OXYGEN SATURATION: 94 % | BODY MASS INDEX: 28.53 KG/M2

## 2018-01-26 PROBLEM — R11.0 NAUSEA: Status: RESOLVED | Noted: 2018-01-25 | Resolved: 2018-01-26

## 2018-01-26 PROBLEM — E87.8 ELECTROLYTE ABNORMALITY: Status: RESOLVED | Noted: 2018-01-24 | Resolved: 2018-01-26

## 2018-01-26 PROBLEM — N17.9 AKI (ACUTE KIDNEY INJURY) (HCC): Status: RESOLVED | Noted: 2018-01-20 | Resolved: 2018-01-26

## 2018-01-26 PROBLEM — J18.9 CAP (COMMUNITY ACQUIRED PNEUMONIA): Status: RESOLVED | Noted: 2018-01-20 | Resolved: 2018-01-26

## 2018-01-26 PROBLEM — A41.9 SEPSIS (HCC): Status: RESOLVED | Noted: 2018-01-20 | Resolved: 2018-01-26

## 2018-01-26 PROBLEM — R10.12 LEFT UPPER QUADRANT PAIN: Status: RESOLVED | Noted: 2018-01-24 | Resolved: 2018-01-26

## 2018-01-26 LAB
ALBUMIN SERPL BCP-MCNC: 3.8 G/DL (ref 3.2–4.9)
ALBUMIN/GLOB SERPL: 1.8 G/DL
ALP SERPL-CCNC: 22 U/L (ref 30–99)
ALT SERPL-CCNC: 16 U/L (ref 2–50)
ANION GAP SERPL CALC-SCNC: 9 MMOL/L (ref 0–11.9)
AST SERPL-CCNC: 16 U/L (ref 12–45)
BACTERIA BLD CULT: NORMAL
BACTERIA BLD CULT: NORMAL
BASOPHILS # BLD AUTO: 0.2 % (ref 0–1.8)
BASOPHILS # BLD: 0.01 K/UL (ref 0–0.12)
BILIRUB SERPL-MCNC: 0.4 MG/DL (ref 0.1–1.5)
BUN SERPL-MCNC: 7 MG/DL (ref 8–22)
CALCIUM SERPL-MCNC: 8.5 MG/DL (ref 8.5–10.5)
CHLORIDE SERPL-SCNC: 102 MMOL/L (ref 96–112)
CO2 SERPL-SCNC: 26 MMOL/L (ref 20–33)
CREAT SERPL-MCNC: 0.64 MG/DL (ref 0.5–1.4)
EOSINOPHIL # BLD AUTO: 0.13 K/UL (ref 0–0.51)
EOSINOPHIL NFR BLD: 3 % (ref 0–6.9)
ERYTHROCYTE [DISTWIDTH] IN BLOOD BY AUTOMATED COUNT: 41.3 FL (ref 35.9–50)
GLOBULIN SER CALC-MCNC: 2.1 G/DL (ref 1.9–3.5)
GLUCOSE BLD-MCNC: 106 MG/DL (ref 65–99)
GLUCOSE SERPL-MCNC: 106 MG/DL (ref 65–99)
HCT VFR BLD AUTO: 33.4 % (ref 37–47)
HGB BLD-MCNC: 11.5 G/DL (ref 12–16)
IMM GRANULOCYTES # BLD AUTO: 0.02 K/UL (ref 0–0.11)
IMM GRANULOCYTES NFR BLD AUTO: 0.5 % (ref 0–0.9)
LYMPHOCYTES # BLD AUTO: 0.63 K/UL (ref 1–4.8)
LYMPHOCYTES NFR BLD: 14.7 % (ref 22–41)
MCH RBC QN AUTO: 28.6 PG (ref 27–33)
MCHC RBC AUTO-ENTMCNC: 34.4 G/DL (ref 33.6–35)
MCV RBC AUTO: 83.1 FL (ref 81.4–97.8)
MONOCYTES # BLD AUTO: 0.47 K/UL (ref 0–0.85)
MONOCYTES NFR BLD AUTO: 10.9 % (ref 0–13.4)
NEUTROPHILS # BLD AUTO: 3.04 K/UL (ref 2–7.15)
NEUTROPHILS NFR BLD: 70.7 % (ref 44–72)
NRBC # BLD AUTO: 0 K/UL
NRBC BLD-RTO: 0 /100 WBC
PLATELET # BLD AUTO: 60 K/UL (ref 164–446)
PMV BLD AUTO: 9.2 FL (ref 9–12.9)
POTASSIUM SERPL-SCNC: 3.3 MMOL/L (ref 3.6–5.5)
PROT SERPL-MCNC: 5.9 G/DL (ref 6–8.2)
RBC # BLD AUTO: 4.02 M/UL (ref 4.2–5.4)
SIGNIFICANT IND 70042: NORMAL
SIGNIFICANT IND 70042: NORMAL
SITE SITE: NORMAL
SITE SITE: NORMAL
SODIUM SERPL-SCNC: 137 MMOL/L (ref 135–145)
SOURCE SOURCE: NORMAL
SOURCE SOURCE: NORMAL
WBC # BLD AUTO: 4.3 K/UL (ref 4.8–10.8)

## 2018-01-26 PROCEDURE — 700102 HCHG RX REV CODE 250 W/ 637 OVERRIDE(OP): Performed by: INTERNAL MEDICINE

## 2018-01-26 PROCEDURE — 80053 COMPREHEN METABOLIC PANEL: CPT

## 2018-01-26 PROCEDURE — 82962 GLUCOSE BLOOD TEST: CPT

## 2018-01-26 PROCEDURE — 700111 HCHG RX REV CODE 636 W/ 250 OVERRIDE (IP): Performed by: HOSPITALIST

## 2018-01-26 PROCEDURE — A9270 NON-COVERED ITEM OR SERVICE: HCPCS | Performed by: FAMILY MEDICINE

## 2018-01-26 PROCEDURE — 700102 HCHG RX REV CODE 250 W/ 637 OVERRIDE(OP): Performed by: HOSPITALIST

## 2018-01-26 PROCEDURE — 85025 COMPLETE CBC W/AUTO DIFF WBC: CPT

## 2018-01-26 PROCEDURE — 99239 HOSP IP/OBS DSCHRG MGMT >30: CPT | Performed by: FAMILY MEDICINE

## 2018-01-26 PROCEDURE — 700111 HCHG RX REV CODE 636 W/ 250 OVERRIDE (IP): Performed by: INTERNAL MEDICINE

## 2018-01-26 PROCEDURE — A9270 NON-COVERED ITEM OR SERVICE: HCPCS | Performed by: HOSPITALIST

## 2018-01-26 PROCEDURE — A9270 NON-COVERED ITEM OR SERVICE: HCPCS | Performed by: INTERNAL MEDICINE

## 2018-01-26 PROCEDURE — 700102 HCHG RX REV CODE 250 W/ 637 OVERRIDE(OP): Performed by: FAMILY MEDICINE

## 2018-01-26 PROCEDURE — 700111 HCHG RX REV CODE 636 W/ 250 OVERRIDE (IP): Mod: JG | Performed by: HOSPITALIST

## 2018-01-26 RX ORDER — CEFDINIR 300 MG/1
300 CAPSULE ORAL EVERY 12 HOURS
Qty: 24 CAP | Refills: 0 | Status: SHIPPED | OUTPATIENT
Start: 2018-01-26 | End: 2018-02-07

## 2018-01-26 RX ADMIN — AMBRISENTAN 10 MG: 10 TABLET, FILM COATED ORAL at 09:00

## 2018-01-26 RX ADMIN — STANDARDIZED SENNA CONCENTRATE AND DOCUSATE SODIUM 2 TABLET: 8.6; 5 TABLET, FILM COATED ORAL at 08:15

## 2018-01-26 RX ADMIN — OMEPRAZOLE 40 MG: 20 CAPSULE, DELAYED RELEASE ORAL at 08:14

## 2018-01-26 RX ADMIN — POTASSIUM BICARBONATE 25 MEQ: 25 TABLET, EFFERVESCENT ORAL at 09:00

## 2018-01-26 RX ADMIN — OXYCODONE HYDROCHLORIDE 10 MG: 10 TABLET ORAL at 08:28

## 2018-01-26 RX ADMIN — PRAVASTATIN SODIUM 20 MG: 20 TABLET ORAL at 08:14

## 2018-01-26 RX ADMIN — PROCHLORPERAZINE EDISYLATE 10 MG: 5 INJECTION INTRAMUSCULAR; INTRAVENOUS at 01:34

## 2018-01-26 RX ADMIN — CEFDINIR 300 MG: 300 CAPSULE ORAL at 08:15

## 2018-01-26 RX ADMIN — LEVOTHYROXINE SODIUM 137 MCG: 137 TABLET ORAL at 06:08

## 2018-01-26 RX ADMIN — ASPIRIN 81 MG: 81 TABLET, COATED ORAL at 08:14

## 2018-01-26 RX ADMIN — TIOTROPIUM BROMIDE 1 CAPSULE: 18 CAPSULE ORAL; RESPIRATORY (INHALATION) at 08:19

## 2018-01-26 RX ADMIN — ENOXAPARIN SODIUM 40 MG: 100 INJECTION SUBCUTANEOUS at 08:15

## 2018-01-26 RX ADMIN — TACROLIMUS 1.5 MG: 1 CAPSULE ORAL at 08:19

## 2018-01-26 RX ADMIN — ONDANSETRON HYDROCHLORIDE 4 MG: 2 INJECTION, SOLUTION INTRAMUSCULAR; INTRAVENOUS at 08:34

## 2018-01-26 RX ADMIN — MYCOPHENOLATE MOFETIL 500 MG: 250 CAPSULE ORAL at 08:17

## 2018-01-26 RX ADMIN — MORPHINE SULFATE 15 MG: 15 TABLET ORAL at 00:55

## 2018-01-26 RX ADMIN — PREDNISONE 7 MG: 1 TABLET ORAL at 08:18

## 2018-01-26 RX ADMIN — ALPRAZOLAM 1 MG: 1 TABLET ORAL at 01:41

## 2018-01-26 RX ADMIN — Medication 100 MG: at 08:14

## 2018-01-26 ASSESSMENT — PAIN SCALES - GENERAL
PAINLEVEL_OUTOF10: 8
PAINLEVEL_OUTOF10: 7
PAINLEVEL_OUTOF10: 10

## 2018-01-26 ASSESSMENT — ENCOUNTER SYMPTOMS
NAUSEA: 1
CHILLS: 0
WEAKNESS: 0
COUGH: 1
FEVER: 0
ABDOMINAL PAIN: 1
DIARRHEA: 0
SPUTUM PRODUCTION: 0

## 2018-01-26 ASSESSMENT — LIFESTYLE VARIABLES: EVER_SMOKED: NEVER

## 2018-01-26 NOTE — CARE PLAN
Problem: Communication  Goal: The ability to communicate needs accurately and effectively will improve  Outcome: PROGRESSING AS EXPECTED  Pt capable of verbalizing all needs and utilizes call light system approprietly.    Problem: Safety  Goal: Will remain free from falls  Outcome: PROGRESSING AS EXPECTED  Pt moderate fall risk, pt wearing treaded socks, bed locked and in lowest position, bed alarm refused despite education.  Pt call light and phone within reach.

## 2018-01-26 NOTE — DISCHARGE PLANNING
Care Transition Team Assessment    SW met with pt to complete assessment.  Pt reports she has a supportive family and reports no concerns at this time.  Pt reports she currently utilizes [facility] for O2 and [facility] and wants to continue services with both companies.  SW faxed choice on [facility] to [facility].  PA[facility] contacted [facility] and they will deliver in the next 2 hours.  Pt reports no needs at this time.  Pt reports she is eager to d/c from hospital.  Pt signed IMM.      Information Source  Orientation : Oriented x 4  Information Given By: Patient  Who is responsible for making decisions for patient? : Patient      Elopement Risk  Legal Hold: No  Ambulatory or Self Mobile in Wheelchair: Yes  Disoriented: No  Psychiatric Symptoms: None  History of Wandering: No  Elopement this Admit: No  Vocalizing Wanting to Leave: No  Displays Behaviors, Body Language Wanting to Leave: No-Not at Risk for Elopement  Elopement Risk: Not at Risk for Elopement    Interdisciplinary Discharge Planning  Does Admitting Nurse Feel This Could be a Complex Discharge?: No  Primary Care Physician: Emanuel  Lives with - Patient's Self Care Capacity: Spouse  Patient or legal guardian wants to designate a caregiver (see row info): No  Support Systems: Friends / Neighbors, Family Member(s)  Housing / Facility: 2 Story House  Able to Return to Previous ADL's: Yes  Mobility Issues: No  Prior Services: None  Patient Expects to be Discharged to:: Home  Assistance Needed: Unknown at this Time  Durable Medical Equipment: Not Applicable (already has O2 at home)    Discharge Preparedness  What is your plan after discharge?: Home health care  What are your discharge supports?: Child, Spouse    Finances  Financial Barriers to Discharge: No  Prescription Coverage: Yes    Vision / Hearing Impairment  Hearing Impairment : No    Domestic Abuse  Have you ever been the victim of abuse or violence?: No  Physical Abuse or Sexual Abuse:  No  Verbal Abuse or Emotional Abuse: No  Possible Abuse Reported to:: Not Applicable    Discharge Risks or Barriers  Discharge risks or barriers?: No    Anticipated Discharge Information  Anticipated discharge disposition: [facility], Home

## 2018-01-26 NOTE — PROGRESS NOTES
Pt transferred from [facility] bed 1 via wheelchair accompanied by ICU RN and .  Pt ambulated to bed with stand by assist.  Pt assessed, A&O x4.  Pt complaining of 9/10 abdominal pain, pt medicated per MAR.  2 RN skin assessment complete, skin intact.  Updated pt on unit routine including call light system, hourly rounding, white board information, need for bed alarm, and visitors policy.  Bed in lowest position, locked, bed alarm refused despite education, pt wearing treaded socks, and pt instructed on need to call for assistance prior to getting out of bed.  Call light, phone, and personal belongs within reach, white board updated.

## 2018-01-26 NOTE — DISCHARGE SUMMARY
"CHIEF COMPLAINT ON ADMISSION  Chief Complaint   Patient presents with   • Shortness of Breath     Pt. states increasing sob starting 3-4 days ago. Pt. given 1 albuterol treatment per [facility].   • Abdominal Pain     Pt. states constipation. Pt. has hx of liver transplant. Pt. states \"my spleen hurts.\"       CODE STATUS  Full Code    HPI & HOSPITAL COURSE  This is a 57 y.o. female with extensive past medical history that includes but not limited to, sarcoidosis, liver failure status post liver transplant in 2011, pulmonary arterial hypertension oxygen dependent who came in with acute respiratory failure. He was found to have sepsis secondary to bilateral pneumonia. CT scan angiogram chest ruled out pulmonary embolism. She was initially hypotensive but was resuscitated with IV fluids and her blood pressure recovered. Her oxygen requirements were weaned down to her baseline. She was treated aggressively with IV antibiotics. Infectious diseases and pulmonary medicine were on board. Patient improved over the hospital course gradually. Her IV antibiotics were discontinued later of her hospital stay to oral antibiotics as per ID recommendations. Patient was ambulatory in the room and down the kowalski. She has leukopenia and thrombocytopenia due to her immunosuppressed state from immunosuppressive medications that she takes for her liver transplant and sarcoidosis. Her kidney functions improve over the hospital stay. She was also initially on tube feeding due to poor by mouth intake but then gradually she was started by mouth fairly. NG tube was discontinued. Patient was hemodynamically and clinically stable. Consultants cleared the patient for discharge. Her PICC line and central line were removed. Patient will go home with home health and to complete her course of oral antibiotics as per ID recommendations.    The patient met 2-midnight criteria for an inpatient stay at the time of discharge.    Therefore, she is discharged in " good and stable condition with close outpatient follow-up.    SPECIFIC OUTPATIENT FOLLOW-UP  Follow-up with PCP in one week.  Follow-up with pulmonologist as per recommendations.  Follow-up with hepatologist as per recommendations.    DISCHARGE PROBLEM LIST  Principal Problem (Resolved):    Sepsis (CMS-HCC) POA: Yes  Active Problems:    Status post liver transplant Dr. Canada ([facility]) POA: Yes      Overview: -December 2011: Status post liver transplant for end stage liver disease       at [facility], followed by Dr. Canada.          Hypothyroidism, adult POA: Yes    IDDM (insulin dependent diabetes mellitus) (CMS-HCC) POA: Yes    Drug-induced constipation POA: Yes    Thrombocytopenia (CMS-HCC) POA: Yes    Chronic pain POA: Unknown  Resolved Problems:    CAP (community acquired pneumonia) POA: Yes    Acute on chronic respiratory failure with hypoxia (CMS-HCC) POA: Yes    IRMA (acute kidney injury) (CMS-HCC) POA: Yes    Left upper quadrant pain POA: Unknown    Electrolyte abnormality POA: Unknown    Nausea POA: Unknown      FOLLOW UP  Future Appointments  Date Time Provider Department Center   2/7/2018 8:00 AM [facility] None   2/7/2018 2:30 PM [facility] DX 1 SMDX [facility] [place]   2/20/2018 10:00 AM Elisa Phillip M.D. [facility] S[facility] [place]   2/27/2018 11:00 AM [facility] None   2/28/2018 12:45 PM Maxwell Phan M.D. [facility] None   3/7/2018 8:00 AM [facility] None   4/11/2018 8:00 AM [facility] None   4/16/2018 11:15 AM [facility] [place]   5/9/2018 8:00 AM [facility] None   6/6/2018 8:00 AM [facility] None     No follow-up provider specified.    MEDICATIONS ON DISCHARGE   Roxana [place]   Home Medication Instructions MORELIA:[id number]    Printed on:01/26/18 1200   Medication Information                      ACIDOPHILUS LACTOBACILLUS PO  Take 1 Capsule by mouth every day.             alprazolam (XANAX) 1 MG Tab  Take 1 Tab by mouth 3 times a day  as needed for Anxiety for up to 30 days.             ambrisentan (LETAIRIS) 10 MG tablet  Take 1 Tab by mouth every day.             ascorbic acid (ASCORBIC ACID) 500 MG Tab  Take 500 mg by mouth every day.             aspirin 81 MG tablet  Take 1 Tab by mouth every day.             bisacodyl (DULCOLAX) 10 MG Suppos  Insert 1 Suppository in rectum 1 time daily as needed (constipation).             cefdinir (OMNICEF) 300 MG Cap  Take 1 Cap by mouth every 12 hours for 12 days.             CITRACAL MAXIMUM 315-250 MG-UNIT Tab  TAKE 2 TABS BY MOUTH 2X/ DAY WITH FOOD BEFORE AND AFTER DINNER FOR LIFE-CRUSH 1ST 3 MONTH, SPACE MVI             cyclobenzaprine (FLEXERIL) 10 MG Tab  Take 10 mg by mouth 3 times a day as needed for Muscle Spasms.             ferrous sulfate (FEOSOL) 220 (44 FE) MG/5ML Elixir  Take 5 mL by mouth every day.             fluticasone (FLONASE) 50 MCG/ACT nasal spray  SPRAY 1 SPRAY IN EACH NOSTRIL TWICE A DAY             furosemide (LASIX) 40 MG Tab  TAKE 1 TAB BY MOUTH EVERY DAY.             lactulose 10 GM/15ML Solution  Take 30 mL by mouth 2 times a day.             levothyroxine (SYNTHROID) 137 MCG Tab  Take 1 Tab by mouth Every morning on an empty stomach.             Linaclotide (LINZESS) 290 MCG Cap  Take 1 Cap by mouth every day.             methylnaltrexone (RELISTOR) 12 MG/0.6ML Solution  Inject 0.6 mL as instructed 1 time daily as needed for up to 30 days.             morphine (MS IR) 15 MG tablet  Take 1 Tab by mouth every four hours as needed for Severe Pain.             mycophenolate (CELLCEPT) 250 MG Cap  Take 500 mg by mouth 2 times a day.             Naloxegol Oxalate (MOVANTIK) 25 MG Tab  Take 25 mg by mouth every day.             NON SPECIFIED  Take 1 Cap by mouth every evening. Bariatric Dietary Supplment              omeprazole (PRILOSEC) 40 MG delayed-release capsule  Take 1 Cap by mouth every day.             ondansetron (ZOFRAN ODT) 4 MG TABLET DISPERSIBLE  Take 1 Tab by mouth  every 8 hours as needed for Nausea/Vomiting. Nausea and vomiting             oxycodone immediate release (ROXICODONE) 10 MG immediate release tablet  Take 1 Tab by mouth 3 times a day as needed for Severe Pain for up to 30 days.             pravastatin (PRAVACHOL) 20 MG Tab  TAKE 1 TAB BY MOUTH EVERY DAY.             predniSONE (DELTASONE) 1 MG Tab  Take 2 mg by mouth every day. Total dose = 7 mg             predniSONE (DELTASONE) 5 MG Tab  Take 5 mg by mouth every day. Total dose = 7 mg             sertraline (ZOLOFT) 100 MG Tab  Take 1 Tab by mouth every bedtime.             tacrolimus (PROGRAF) 0.5 MG Cap  Take 0.5 mg by mouth 2 Times a Day. Takes with 1 mg tacrolimus BID             tacrolimus (PROGRAF) 1 MG Cap  TAKE 1 CAPSULE BY MOUTH EVERY MORNING AND 1 CAP IN THE EVENING-TAKE WITH 0.5MG             Tadalafil, PAH, (ADCIRCA) 20 MG Tab  Take 40 mg by mouth every bedtime. Indications: Pulmonary Arterial Hypertension             tiotropium (SPIRIVA) 18 MCG Cap  Inhale 1 Cap by mouth every day.             trazodone (DESYREL) 50 MG Tab  TAKE 1-2 TABS BY MOUTH EVERY BEDTIME.                 DIET  Orders Placed This Encounter   Procedures   • Diet Order     Standing Status:   Standing     Number of Occurrences:   1     Order Specific Question:   Diet:     Answer:   Regular [1]       ACTIVITY  As tolerated.  Weight bearing as tolerated      CONSULTATIONS  Pulmonary medicine  Infectious diseases    PROCEDURES  PICC line and central line    LABORATORY  Lab Results   Component Value Date/Time    SODIUM 137 01/26/2018 01:45 AM    POTASSIUM 3.3 (L) 01/26/2018 01:45 AM    CHLORIDE 102 01/26/2018 01:45 AM    CO2 26 01/26/2018 01:45 AM    GLUCOSE 106 (H) 01/26/2018 01:45 AM    BUN 7 (L) 01/26/2018 01:45 AM    CREATININE 0.64 01/26/2018 01:45 AM        Lab Results   Component Value Date/Time    WBC 4.3 (L) 01/26/2018 01:45 AM    HEMOGLOBIN 11.5 (L) 01/26/2018 01:45 AM    HEMATOCRIT 33.4 (L) 01/26/2018 01:45 AM    PLATELETCT  60 (L) 01/26/2018 01:45 AM        Total time of the discharge process exceeds 40 minutes

## 2018-01-26 NOTE — DISCHARGE INSTRUCTIONS
Discharge Instructions    Discharged to home by car with relative. Discharged via wheelchair, hospital escort: Yes.  Special equipment needed: Oxygen    Be sure to schedule a follow-up appointment with your primary care doctor or any specialists as instructed.     Discharge Plan:   Influenza Vaccine Indication: Patient Refuses    I understand that a diet low in cholesterol, fat, and sodium is recommended for good health. Unless I have been given specific instructions below for another diet, I accept this instruction as my diet prescription.   Other diet: Regular    Special Instructions: None    · Is patient discharged on Warfarin / Coumadin?   No     Depression / Suicide Risk    As you are discharged from this [facility] facility, it is important to learn how to keep safe from harming yourself.    Recognize the warning signs:  · Abrupt changes in personality, positive or negative- including increase in energy   · Giving away possessions  · Change in eating patterns- significant weight changes-  positive or negative  · Change in sleeping patterns- unable to sleep or sleeping all the time   · Unwillingness or inability to communicate  · Depression  · Unusual sadness, discouragement and loneliness  · Talk of wanting to die  · Neglect of personal appearance   · Rebelliousness- reckless behavior  · Withdrawal from people/activities they love  · Confusion- inability to concentrate     If you or a loved one observes any of these behaviors or has concerns about self-harm, here's what you can do:  · Talk about it- your feelings and reasons for harming yourself  · Remove any means that you might use to hurt yourself (examples: pills, rope, extension cords, firearm)  · Get professional help from the community (Mental Health, Substance Abuse, psychological counseling)  · Do not be alone:Call your Safe Contact- someone whom you trust who will be there for you.  · Call your local CRISIS HOTLINE 187-2418 or 105-626-5726  · Call  your local Children's Mobile Crisis Response Team [place] (387) 140-9879 or www.Zdorovio  · Call the toll free National Suicide Prevention Hotlines   · National Suicide Prevention Lifeline 329-090-YABG (6092)  · National Hope Line Network 800-SUICIDE (760-1245)    Sepsis, Adult  Sepsis is a serious infection of your blood or tissues that affects your whole body. The infection that causes sepsis may be bacterial, viral, fungal, or parasitic. Sepsis may be life threatening. Sepsis can cause your blood pressure to drop. This may result in shock. Shock causes your central nervous system and your organs to stop working correctly.   RISK FACTORS  Sepsis can happen in anyone, but it is more likely to happen in people who have weakened immune systems.  SIGNS AND SYMPTOMS   Symptoms of sepsis can include:  · Fever or low body temperature (hypothermia).  · Rapid breathing (hyperventilation).  · Chills.  · Rapid heartbeat (tachycardia).  · Confusion or light-headedness.  · Trouble breathing.  · Urinating much less than usual.  · Cool, clammy skin or red, flushed skin.  · Other problems with the heart, kidneys, or brain.  DIAGNOSIS   Your health care provider will likely do tests to look for an infection, to see if the infection has spread to your blood, and to see how serious your condition is. Tests can include:  · Blood tests, including cultures of your blood.  · Cultures of other fluids from your body, such as:  ¨ Urine.  ¨ Pus from wounds.  ¨ Mucus coughed up from your lungs.  · Urine tests other than cultures.  · X-ray exams or other imaging tests.  TREATMENT   Treatment will begin with elimination of the source of infection. If your sepsis is likely caused by a bacterial or fungal infection, you will be given antibiotic or antifungal medicines.  You may also receive:  · Oxygen.  · Fluids through an IV tube.  · Medicines to increase your blood pressure.  · A machine to clean your blood (dialysis) if your  kidneys fail.  · A machine to help you breathe if your lungs fail.  SEEK IMMEDIATE MEDICAL CARE IF:  You get an infection or develop any of the signs and symptoms of sepsis after surgery or a hospitalization.     This information is not intended to replace advice given to you by your health care provider. Make sure you discuss any questions you have with your health care provider.     Document Released: 09/15/2004 Document Revised: 05/03/2016 Document Reviewed: 08/25/2014  [employer] Interactive Patient Education ©2016 [employer].    Respiratory failure is when your lungs are not working well and your breathing (respiratory) system fails. When respiratory failure occurs, it is difficult for your lungs to get enough oxygen, get rid of carbon dioxide, or both. Respiratory failure can be life threatening.   Respiratory failure can be acute or chronic. Acute respiratory failure is sudden, severe, and requires emergency medical treatment. Chronic respiratory failure is less severe, happens over time, and requires ongoing treatment.   WHAT ARE THE CAUSES OF ACUTE RESPIRATORY FAILURE?   Any problem affecting the heart or lungs can cause acute respiratory failure. Some of these causes include the following:  · Chronic bronchitis and emphysema (COPD).    · Blood clot going to a lung (pulmonary embolism).    · Having water in the lungs caused by heart failure, lung injury, or infection (pulmonary edema).    · Collapsed lung (pneumothorax).    · Pneumonia.    · Pulmonary fibrosis.    · Obesity.    · Asthma.    · Heart failure.    · Any type of trauma to the chest that can make breathing difficult.    · Nerve or muscle diseases making chest movements difficult.  WHAT SYMPTOMS SHOULD YOU WATCH FOR?   If you have any of these signs or symptoms, you should seek immediate medical care:   · You have shortness of breath (dyspnea) with or without activity.    · You have rapid, fast breathing (tachypnea).    · You are wheezing.  · You  are unable to say more than a few words without having to catch your breath.  · You find it very difficult to function normally.  · You have a fast heart rate.    · You have a bluish color to your finger or toe nail beds.    · You have confusion or drowsiness or both.    HOW WILL MY ACUTE RESPIRATORY FAILURE BE TREATED?   Treatment of acute respiratory failure depends on the cause of the respiratory failure. Usually, you will stay in the intensive care unit so your breathing can be watched closely. Treatment can include the following:  · Oxygen. Oxygen can be delivered through the following:  ¨ Nasal cannula. This is small tubing that goes in your nose to give you oxygen.  ¨ Face mask. A face mask covers your nose and mouth to give you oxygen.  · Medicine. Different medicines can be given to help with breathing. These can include:  ¨ Nebulizers. Nebulizers deliver medicines to open the air passages (bronchodilators). These medicines help to open or relax the airways in the lungs so you can breathe better. They can also help loosen mucus from your lungs.  ¨ Diuretics. Diuretic medicines can help you breathe better by getting rid of extra water in your body.  ¨ Steroids. Steroid medicines can help decrease swelling (inflammation) in your lungs.  ¨ Antibiotics.  · Chest tube. If you have a collapsed lung (pneumothorax), a chest tube is placed to help reinflate the lung.  · Non-invasive positive pressure ventilation (NPPV). This is a tight-fitting mask that goes over your nose and mouth. The mask has tubing that is attached to a machine. The machine blows air into the tubing, which helps to keep the tiny air sacs (alveoli) in your lungs open. This machine allows you to breathe on your own.  · Ventilator. A ventilator is a breathing machine. When on a ventilator, a breathing tube is put into the lungs. A ventilator is used when you can no longer breathe well enough on your own. You may have low oxygen levels or high  carbon dioxide (CO2) levels in your blood. When you are on a ventilator, sedation and pain medicines are given to make you sleep so your lungs can heal.     This information is not intended to replace advice given to you by your health care provider. Make sure you discuss any questions you have with your health care provider.     Document Released: 12/23/2014 Document Revised: 01/08/2016 Document Reviewed: 12/23/2014  [employer] Interactive Patient Education ©2016 Elsevier Inc.      Pneumonia, Adult  Pneumonia is an infection of the lungs.   CAUSES  Pneumonia may be caused by bacteria or a virus. Usually, the infection is caused by breathing in droplets from an infected person's cough or sneeze.   SYMPTOMS   Symptoms of pneumonia include:  · Cough.  · Fever.  · Chest pain.  · Rapid breathing.  · Shortness of breath.  · Shaking chills.  · Mucus production.  DIAGNOSIS   If you have the common symptoms of pneumonia, often your health care provider will confirm the diagnosis with a chest X-ray. The X-ray will show an abnormality in the lung if you have pneumonia. Other tests may be done on your blood, urine, or mucus (sputum) to find the specific cause of your pneumonia. A blood gas test or pulse oximetry test may be needed to check how well your lungs are working.  TREATMENT   Your treatment will depend on whether your pneumonia is caused by bacteria or a virus.   · Bacterial pneumonia is treated with antibiotic medicine.  · Pneumonia that is caused by the influenza virus may be treated with an antiviral medicine.  · Pneumonia that is caused by a virus other than influenza will not respond to antibiotic medicine. This type of pneumonia will have to run its course.   HOME CARE INSTRUCTIONS   · Cough suppressants may be used if you are losing too much rest from coughing at night. However, you should try to avoid taking cough suppresants. This is because coughing helps to remove mucus from your lungs.  · Sleep in a  semi-upright position at night. Try sleeping in a reclining chair, or place a few pillows under your head.  · Try using a cold steam vaporizer or humidifier in your home or bedroom. This may help loosen your mucus.  · If you were prescribed an antibiotic medicine, finish all of it even if you start to feel better.  · If you were prescribed an expectorant, take it as directed by your health care provider. This medicine loosens the mucus so you can cough it up.  · Take medicines only as directed by your health care provider.  · Do not smoke. If you are a smoker and continue to smoke, your cough may last several weeks after your pneumonia has cleared.  · Get rest when you feel tired, or as needed.  PREVENTION  A pneumococcal shot (vaccine) is available to prevent a common bacterial cause of pneumonia. This is usually suggested for:  · People over 65 years old.  · People on chemotherapy.  · People with chronic lung problems, such as bronchitis or emphysema.  · People with immune system problems.  If you are over 65 years old or have a high risk condition, you may receive the pneumococcal vaccine if you have not received it before. In some countries, a routine influenza vaccine is also recommended. This vaccine can help prevent some cases of pneumonia. You may be offered the influenza vaccine as part of your care.  If you are a smoker, it is time to quit in order to prevent pneumonia in the future. You may receive instructions on how to stop smoking. Your health care provider can provide medicines and counseling to help you quit.  SEEK MEDICAL CARE IF:  · You have a fever.  · You cannot control your cough with suppressants at night, and you keep losing sleep.  SEEK IMMEDIATE MEDICAL CARE IF:   · You have worsening shortness of breath.  · You have increased chest pain.  · Your sickness becomes worse, especially if you are an older adult or have a weakened immune system.  · You cough up blood.  · You have pain that is  getting worse or is not controlled with medicines.  · Your symptoms are getting worse rather than better.     This information is not intended to replace advice given to you by your health care provider. Make sure you discuss any questions you have with your health care provider.     Document Released: 12/18/2006 Document Revised: 01/08/2016 Document Reviewed: 04/13/2016  [employer] Interactive Patient Education ©2016 Elsevier Inc.

## 2018-01-26 NOTE — DISCHARGE PLANNING
Pt uses [facility] for O2 services. SW placed call to [facility] requesting tank for d/c. [facility] will deliver a tank in the next 2 hours.

## 2018-01-26 NOTE — PROGRESS NOTES
Patient transported via wheelchair by this ACLS RN to [id number] at patient's request. Spouse followed with patient's belongings.

## 2018-01-26 NOTE — DISCHARGE PLANNING
Received Home Health choice from [facility]) at [phone].  Will need HH order to send referral.  Denise) notified.

## 2018-01-26 NOTE — DISCHARGE PLANNING
ATTN: Case Management  RE: Referral for Home Health    Reason for referral denial: We are unable to accept this referral at this time. If you have any questions please call [phone]                  We would like to take this opportunity to thank you for submitting a referral for your patient to continue the journey to recovery with [facility]. We hope to facilitate continued referrals from your facility as our goal is to provide quality, skilled care to as many patients as possible.            Unfortunately, we are not able to accept your patient into our service for the reason listed above. If further clarity is needed, our Intake Coordinators are available to discuss any barriers to service.            If you have any questions or concerns regarding this or future patients’ transition to Home Health, please do not hesitate to contact us. We are open for referrals 7 days a week from 8AM to 5PM at [phone].      We look forward to collaborating with you in the future,  [facility] Team

## 2018-01-26 NOTE — PROGRESS NOTES
Dr. Arabella Marte called and updated on patient request to have MS IR placed back on the MAR for pain control. Orders to be placed by MD.

## 2018-01-26 NOTE — PROGRESS NOTES
Infectious Disease Progress Note    Author: Silviano Murphypaola Date & Time created: 1/26/2018  9:10 AM    Interval History:  Abx day #6 - Cefdinir    Previously received ceftriaxone, unasyn, zosyn, azithromycin, linezolid    1/19: CT Chest w/o: Previously demonstrated RLL & LLL infiltrates have almost completely cleared.Emphysematous lungs.                                    A 2.9 mm superior segment right lower lobe nodule is probably unchanged.                                    Splenomegaly and 2.7 cm hypodense lesion within the spleen unchanged.  1/20: Blood culture: neg, CXR: Hypoinflation with bibasilar atelectasis and possible superimposed LEFT basilar pneumonia.           CT scan: CTA - No thoracic or abdominal aortic aneurysm or dissection. Findings consistent with pulmonary hypertension.                           No CT evidence for pulmonary embolus.                           Bilateral lower lobe consolidation, most concerning for pneumonia.                           Splenomegaly with splenic cyst again present.                            Increased colonic stool.  1/20: Right IJ placed. MRSA nasal swab negative, Viral swab negative  1/21: CRP 13.11. Vomiting/Nausea this am. No fevers. Reports spleen hurting. Linezolid stopped. Mycoplasma IGM neg. IGG (+)  1/22: Continued nausea. No vomiting. Abdominal discomfort remains. Azithromycin stopped. No fevers. Had BMs. ESR 21, Procalcitonin 0.34,   1/23: Cortrak placed to increase caloric intake.  States still with nausea and abdominal pain.  States cannot take large meals due to previous gastric bypass.  Wants out of unit  1/24: Continued nausea and reported vomiting. No fevers. Continued abdominal pains. Xray abdomen negative.  1/25: Ceftriaxone changed to cefdinir.  1/26: No acute issues. No fevers.    Labs Reviewed, Medications Reviewed, Radiology Reviewed, Wound Reviewed, Fluids Reviewed and GI Nutrition Reviewed    Review of Systems:  Review of Systems    Constitutional: Negative for chills and fever.   Respiratory: Positive for cough. Negative for sputum production.    Cardiovascular: Negative for chest pain.   Gastrointestinal: Positive for abdominal pain and nausea. Negative for diarrhea.   Skin: Negative for rash.   Neurological: Negative for weakness.     Physical Exam:  Physical Exam   Constitutional: She is oriented to person, place, and time. She appears well-developed and well-nourished.   HENT:   Cortrak in place.   Cardiovascular: Normal rate and regular rhythm.    No murmur heard.  Pulmonary/Chest: Effort normal. No respiratory distress. She has no wheezes. She has no rales.   Lungs fairly clear in midfields.  Diminished at bases, but better compared to Tuesday.   Abdominal: Soft. Bowel sounds are normal. There is no tenderness.   Musculoskeletal: Normal range of motion. She exhibits no edema.   Neurological: She is alert and oriented to person, place, and time. No cranial nerve deficit.   Skin: Skin is warm and dry. No rash noted.   Psychiatric: She has a normal mood and affect.       Labs:  Recent Results (from the past 24 hour(s))   ACCU-CHEK GLUCOSE    Collection Time: 01/25/18 11:47 AM   Result Value Ref Range    Glucose - Accu-Ck 170 (H) 65 - 99 mg/dL   ACCU-CHEK GLUCOSE    Collection Time: 01/25/18  5:47 PM   Result Value Ref Range    Glucose - Accu-Ck 136 (H) 65 - 99 mg/dL   ACCU-CHEK GLUCOSE    Collection Time: 01/25/18  9:34 PM   Result Value Ref Range    Glucose - Accu-Ck 136 (H) 65 - 99 mg/dL   CBC with Differential    Collection Time: 01/26/18  1:45 AM   Result Value Ref Range    WBC 4.3 (L) 4.8 - 10.8 K/uL    RBC 4.02 (L) 4.20 - 5.40 M/uL    Hemoglobin 11.5 (L) 12.0 - 16.0 g/dL    Hematocrit 33.4 (L) 37.0 - 47.0 %    MCV 83.1 81.4 - 97.8 fL    MCH 28.6 27.0 - 33.0 pg    MCHC 34.4 33.6 - 35.0 g/dL    RDW 41.3 35.9 - 50.0 fL    Platelet Count 60 (L) 164 - 446 K/uL    MPV 9.2 9.0 - 12.9 fL    Neutrophils-Polys 70.70 44.00 - 72.00 %     Lymphocytes 14.70 (L) 22.00 - 41.00 %    Monocytes 10.90 0.00 - 13.40 %    Eosinophils 3.00 0.00 - 6.90 %    Basophils 0.20 0.00 - 1.80 %    Immature Granulocytes 0.50 0.00 - 0.90 %    Nucleated RBC 0.00 /100 WBC    Neutrophils (Absolute) 3.04 2.00 - 7.15 K/uL    Lymphs (Absolute) 0.63 (L) 1.00 - 4.80 K/uL    Monos (Absolute) 0.47 0.00 - 0.85 K/uL    Eos (Absolute) 0.13 0.00 - 0.51 K/uL    Baso (Absolute) 0.01 0.00 - 0.12 K/uL    Immature Granulocytes (abs) 0.02 0.00 - 0.11 K/uL    NRBC (Absolute) 0.00 K/uL   Comp Metabolic Panel (CMP)    Collection Time: 01/26/18  1:45 AM   Result Value Ref Range    Sodium 137 135 - 145 mmol/L    Potassium 3.3 (L) 3.6 - 5.5 mmol/L    Chloride 102 96 - 112 mmol/L    Co2 26 20 - 33 mmol/L    Anion Gap 9.0 0.0 - 11.9    Glucose 106 (H) 65 - 99 mg/dL    Bun 7 (L) 8 - 22 mg/dL    Creatinine 0.64 0.50 - 1.40 mg/dL    Calcium 8.5 8.5 - 10.5 mg/dL    AST(SGOT) 16 12 - 45 U/L    ALT(SGPT) 16 2 - 50 U/L    Alkaline Phosphatase 22 (L) 30 - 99 U/L    Total Bilirubin 0.4 0.1 - 1.5 mg/dL    Albumin 3.8 3.2 - 4.9 g/dL    Total Protein 5.9 (L) 6.0 - 8.2 g/dL    Globulin 2.1 1.9 - 3.5 g/dL    A-G Ratio 1.8 g/dL   ESTIMATED GFR    Collection Time: 01/26/18  1:45 AM   Result Value Ref Range    GFR If African American >60 >60 mL/min/1.73 m 2    GFR If Non African American >60 >60 mL/min/1.73 m 2     Results     Procedure Component Value Units Date/Time    BLOOD CULTURE [[id number]] Collected:  01/21/18 0312    Order Status:  Completed Specimen:  Blood Updated:  01/26/18 0500     Significant Indicator NEG     Source BLD     Site --     Blood Culture No growth after 5 days of incubation.    BLOOD CULTURE [[id number]] Collected:  01/21/18 0312    Order Status:  Completed Specimen:  Blood Updated:  01/26/18 0500     Significant Indicator NEG     Source BLD     Site Peripheral     Blood Culture No growth after 5 days of incubation.    Blood Culture [[id number]] Collected:  01/20/18 0626    Order Status:   "Completed Specimen:  Blood from Peripheral Updated:  01/25/18 0900     Significant Indicator NEG     Source BLD     Site PERIPHERAL     Blood Culture No growth after 5 days of incubation.    Narrative:       From different peripheral sites, if not done within the last  24 hours (Per Hospital Policy: Only change specimen source to  \"Line\" if specified by physician order)    Blood Culture [[id number]] Collected:  01/20/18 0203    Order Status:  Completed Specimen:  Blood from Peripheral Updated:  01/25/18 0700     Significant Indicator NEG     Source BLD     Site PERIPHERAL     Blood Culture No growth after 5 days of incubation.    Narrative:       From different peripheral sites, if not done within the last  24 hours (Per Hospital Policy: Only change specimen source to  \"Line\" if specified by physician order)    RESPIRATORY VIRUS PANEL BY PCR [[id number]] Collected:  01/20/18 1455    Order Status:  Completed Specimen:  Other from Nasopharyngeal Updated:  01/23/18 2219     Influenza virus A RNA Not Detected     Influenza virus A H1 RNA Not Detected     Influenza virus A H3 RNA Not Detected     Influenza A 2009, H1N1, PCR Not Detected     Influenza virus B, PCR Not Detected     Resp Syncytial Virus A, PCR Not Detected     Resp Syncytial Virus B, PCR Not Detected     Parainfluenza virus 1, PCR Not Detected     Parainfluenza virus 2, PCR Not Detected     Parainfluenza virus 3, PCR Not Detected     Human Metapneumovirus, PCR Not Detected     Human Rhinovirus, PCR Not Detected     Adenovirus B/E, PCR Not Detected     Adenovirus C, PCR Not Detected     Comment: Performed by [employer],  [address] [place] [id number] [phone]  [email or web address], Ronnie Kwon MD - Lab. Director         Narrative:       Collected By:[id number] NIMO IBRAHIM    BLOOD CULTURE [[id number]]     Order Status:  No result Specimen:  Blood from Peripheral     BLOOD CULTURE [[id number]]     Order Status:  No result Specimen:  Blood from Peripheral "     BLOOD CULTURE [[id number]]     Order Status:  No result Specimen:  Blood from Peripheral     BLOOD CULTURE [[id number]]     Order Status:  Canceled Specimen:  Blood from Peripheral     BLOOD CULTURE [[id number]]     Order Status:  Canceled Specimen:  Blood from Peripheral     BLOOD CULTURE [[id number]]     Order Status:  Canceled Specimen:  Blood from Peripheral     S. AUREUS BY PCR, NASAL COMPLETE [[id number]] Collected:  18 1454    Order Status:  Completed Specimen:  Nasal from Other Updated:  18 1716     Staph aureus by PCR Negative     MRSA by PCR Negative    Narrative:       Collected By:[id number] NIMO CHAN A    INFLUENZA A/B BY PCR [[id number]] Collected:  18 1035    Order Status:  Completed Specimen:  Nasal from Nasopharyngeal Updated:  18 1117     Influenza virus A RNA Negative     Influenza virus B, PCR Negative    Narrative:       Collected By:[id number] NIMO CHAN A    BLOOD CULTURE,HOLD [[id number]] Collected:  18 0623    Order Status:  Completed Updated:  18 0746     Blood Culture Hold Collected    BLOOD CULTURE,HOLD [[id number]] Collected:  18 0203    Order Status:  Completed Updated:  18 0304     Blood Culture Hold Collected    Culture Respiratory W/ GRM STN [[id number]] Collected:  18 0000    Order Status:  Canceled Specimen:  Sputum from Sputum         Hemodynamics:  Temp (24hrs), Av.3 °C (99.2 °F), Min:36.3 °C (97.3 °F), Max:37.8 °C (100 °F)  Temperature: 37.3 °C (99.2 °F)  Pulse  Av.1  Min: 61  Max: 105Heart Rate (Monitored): 70  Blood Pressure: 142/77, NIBP: (!) 166/79     PIV Group Left Antecubital 18g Saline Lock (Active)   Line Secured Taped;Transparent 2018  9:25 PM   Site Condition / Description Assessed;Patent;Clean;Dry;Intact 2018  9:25 PM   Dressing Type / Description Transparent;Dry;Old / Dried Drainage 2018  9:25 PM   Dressing Status Observed 2018  9:25 PM   Saline Locked Yes 2018  9:25 PM   Infiltration  Grading Used by [facility] 0 1/25/2018  9:25 PM   Phlebitis Scale (Used by [facility]) 0 1/25/2018  9:25 PM   Date Primary Tubing Changed 01/20/18 1/20/2018  5:18 AM   Date Secondary Tubing Changed 01/20/18 1/20/2018  5:18 AM   NEXT Primary Tubing Change  01/23/18 1/20/2018  5:18 AM   NEXT Secondary Tubing Change  01/21/18 1/20/2018  5:18 AM   NEXT Site Change Date 01/27/18 1/25/2018  9:25 PM       Central Line Group Right;Internal Jugular Triple Lumen (Active)   Line Secured Sutured in Place;Transparent 1/25/2018  9:25 PM   Patency and Function Check Performed at Beginning of Shift 1/25/2018  9:25 PM   Line Necessity Assessed Lack of Peripheral Access 1/25/2018  9:25 PM   Consider Removal of Femoral Line Not Applicable 1/25/2018  9:25 PM   Closed Tubing Set Up Yes 1/25/2018  9:25 PM   Hand Washing / Gloves Prior to Every Access Yes 1/25/2018  9:25 PM   Next Daily Chlorhexidine Bath Due (Regional ONLY) 01/26/18 1/25/2018  9:25 PM   Port Access  Scrub the Hub Prior to Access;Blood Return  1/25/2018  9:25 PM   Site Condition / Description Assessed;Patent;Clean;Intact;Dry 1/25/2018  9:25 PM   Signs and Symptoms of Infection None Apparent at this Time 1/25/2018  9:25 PM   Dressing Type / Description Antimicrobial Patch (BioPatch);Transparent;Clean;Dry;Intact 1/25/2018  9:25 PM   Dressing Status Observed 1/25/2018  9:25 PM   Next Dressing Change  01/27/18 1/25/2018  9:25 PM   Date Primary Tubing Changed 01/23/18 1/25/2018  9:25 PM   Date Secondary Tubing Changed 01/25/18 1/25/2018  9:25 PM   NEXT Primary Tubing Change  01/27/18 1/25/2018  9:25 PM   NEXT Secondary Tubing Change  01/26/18 1/25/2018  9:25 PM   Date IV Connector(s) Changed 01/23/18 1/25/2018  9:25 PM   NEXT IV Connector(s) Change Date 01/27/18 1/25/2018  9:25 PM   Waveform Not Applicable 1/25/2018  9:25 PM   Line Calibrated Not Applicable 1/25/2018  9:25 PM     Fluids:  Intake/Output       01/24/18 0700 - 01/25/18 0659 01/25/18 0700 - 01/26/18 0659 01/26/18  0700 - 01/27/18 0659      0700-1859 1900-0659 Total 0700-1859 1900-0659 Total 0700-1859 1900-0659 Total       Intake    P.O.  0  -- 0  120  -- 120  --  -- --    P.O. 0 -- 0 120 -- 120 -- -- --    I.V.  1366.4  1200 2566.4  1700  -- 1700  --  -- --    IV Piggyback Volume (IV Piggyback) -- -- -- 500 -- 500 -- -- --    IV Volume (NS) 1200 1200 2400 1200 -- 1200 -- -- --    IV Volume (zosyn) 166.4 -- 166.4 -- -- -- -- -- --    Other  330  210 540  300  -- 300  --  -- --    Medications (P.O./ Enteral Liquids) 330 210 540 300 -- 300 -- -- --    Enteral  1350  840 2190  770  -- 770  --  -- --    Enteral Volume  350 -- 350 -- -- --    Free Water / Tube Flush 450 90 540 420 -- 420 -- -- --    Total Intake 3046.4 2250 5296.4 2890 -- 2890 -- -- --       Output    Urine  2200  2650 4850  3900  -- 3900  0  -- 0    Number of Times Voided 7 x 6 x 13 x 9 x -- 9 x -- -- --    Void (ml) 2200 2650 4850 3900 -- 3900 0 -- 0    Emesis  --  25 25  --  -- --  --  -- --    Emesis -- 25 25 -- -- -- -- -- --    Emesis - Number of Times -- 1 x 1 x -- -- -- -- -- --    Drains  --  0 0  --  -- --  --  -- --    Residual Amount (ml) (Discarded) -- 0 0 -- -- -- -- -- --    Stool/Urine  --  150 150  200  -- 200  --  -- --    Measurable Stool (ml) -- 150 150 200 -- 200 -- -- --    Stool  --  -- --  --  -- --  --  -- --    Number of Times Stooled 3 x 2 x 5 x 2 x -- 2 x -- -- --    Total Output 2200 2825 5025 4100 -- 4100 0 -- 0       Net I/O     846.4 -575 271.4 -1210 -- -1210 0 -- 0           GI/Nutrition:  Orders Placed This Encounter   Procedures   • Diet Order     Standing Status:   Standing     Number of Occurrences:   1     Order Specific Question:   Diet:     Answer:   Regular [1]     Medications:  Current Facility-Administered Medications   Medication Last Dose   • potassium bicarbonate (KLYTE) 25 MEQ effervescent tablet TBEF 25 mEq     • insulin glargine (LANTUS) injection 15 Units 15 Units at 01/25/18 1339   • cefdinir (OMNICEF)  capsule 300 mg 300 mg at 01/26/18 0815   • morphine (MS IR) tablet 15 mg 15 mg at 01/26/18 0055   • insulin regular (HUMULIN R) injection 2-9 Units Stopped at 01/26/18 0830    And   • glucose 4 g chewable tablet 16 g      And   • dextrose 50% (D50W) injection 25 mL     • methylnaltrexone (RELISTOR) injection 12 mg 12 mg at 01/26/18 0835   • oxycodone immediate-release (ROXICODONE) tablet 5 mg      Or   • oxycodone immediate release (ROXICODONE) tablet 10 mg 10 mg at 01/26/18 0828   • thiamine (THIAMINE) tablet 100 mg 100 mg at 01/26/18 0814   • enoxaparin (LOVENOX) inj 40 mg 40 mg at 01/26/18 0815   • ipratropium-albuterol (DUONEB) nebulizer solution 3 mL 3 mL at 01/20/18 0439   • trazodone (DESYREL) tablet 50 mg 50 mg at 01/25/18 2134   • tiotropium (SPIRIVA) 18 MCG inhalation capsule 1 Cap 1 Cap at 01/26/18 0819   • tacrolimus (PROGRAF) capsule 1.5 mg 1.5 mg at 01/26/18 0819   • sertraline (ZOLOFT) tablet 100 mg 100 mg at 01/25/18 2134   • pravastatin (PRAVACHOL) tablet 20 mg 20 mg at 01/26/18 0814   • omeprazole (PRILOSEC) capsule 40 mg 40 mg at 01/26/18 0814   • mycophenolate (CELLCEPT) capsule 500 mg 500 mg at 01/26/18 0817   • levothyroxine (SYNTHROID) tablet 137 mcg 137 mcg at 01/26/18 0608   • aspirin EC (ECOTRIN) tablet 81 mg 81 mg at 01/26/18 0814   • senna-docusate (PERICOLACE or SENOKOT S) 8.6-50 MG per tablet 2 Tab 2 Tab at 01/26/18 0815    And   • polyethylene glycol/lytes (MIRALAX) PACKET 1 Packet 1 Packet at 01/20/18 1523    And   • magnesium hydroxide (MILK OF MAGNESIA) suspension 30 mL      And   • bisacodyl (DULCOLAX) suppository 10 mg     • Respiratory Care per Protocol     • NS infusion     • NS (BOLUS) infusion 500 mL     • ondansetron (ZOFRAN) syringe/vial injection 4 mg 4 mg at 01/26/18 0834   • ondansetron (ZOFRAN ODT) dispertab 4 mg     • promethazine (PHENERGAN) tablet 12.5-25 mg 25 mg at 01/25/18 1435   • promethazine (PHENERGAN) suppository 12.5-25 mg 25 mg at 01/23/18 0018   •  prochlorperazine (COMPAZINE) injection 5-10 mg 10 mg at 01/26/18 0134   • ambrisentan (LETAIRIS) TABS 10 mg 10 mg at 01/26/18 0900   • alprazolam (XANAX) tablet 1 mg 1 mg at 01/26/18 0141   • Linaclotide CAPS 1 Cap 1 Cap at 01/26/18 0900   • predniSONE (DELTASONE) tablet 7 mg 7 mg at 01/26/18 0818   • Tadalafil (PAH) TABS 40 mg 40 mg at 01/25/18 2100     Medical Decision Making, by Problem:  Active Hospital Problems    Diagnosis   • *Sepsis (CMS-[facility]) [A41.9]   • CAP (community acquired pneumonia) [J18.9]   • IRMA (acute kidney injury) (CMS-[facility]) [N17.9]   • Acute on chronic respiratory failure with hypoxia (CMS-[facility]) [J96.21]   • Hypothyroidism, adult [E03.9]   • Status post liver transplant Dr. [place] ([facility]) [Z94.4]   • Thrombocytopenia (CMS-[facility]) [D69.6]   • Drug-induced constipation [K59.03]   • IDDM (insulin dependent diabetes mellitus) (CMS-[facility]) [E11.9, Z79.4]       Plan:  1.  Severe sepsis, likely secondary to pulmonary source.  2.  Acute Bilateral Lower lobe community acquired pneumonia  3.  Acute-on-chronic hypoxic respiratory failure.  4.  Severe pulmonary arterial hypertension.  5.  Liver disease, status post liver transplant -- immunosuppressed.  6.  Sarcoidosis.  7.  Hepatorenal syndrome.  8.  Stage III chronic kidney disease.  9.  Insulin-dependent diabetes mellitus -- controlled.     PLAN:   1. On cefdinir tolerating. Finish 14 day total course.  2. Abdominal pain curious as well as nausea. Abdominal Xray neg. NG and pain meds likely.       NG removal?  3. Continue pred at current dose  4. Ok to go to floor from ID perspective.  Could possibly be discharged home.  5. O2 per pulm.     Discussed with pt and RN and Hospitalist.    >30 min >50% of time spent in coordination of care/counseling today.    Thank you for this consult. We will continue to follow along with you.    Dr Boston

## 2018-01-26 NOTE — FACE TO FACE
Face to Face Supporting Documentation - Home Health    The encounter with this patient was in whole or in part the primary reason for home health admission.    Date of encounter:   Patient:                    MRN:                       YOB: 2018  Roxana Cuba  [id number]  1960     Home health to see patient for:  Skilled Nursing care for assessment, interventions & education, Home health aide, Physical Therapy evaluation and treatment and Occupational therapy evaluation and treatment    Skilled need for:  Comment: PT/OT/Skilled     Skilled nursing interventions to include:  Comment: PT/OT/Skilled     Homebound status evidenced by:  Need the aid of supportive devices such as crutches, canes, wheelchairs or walkers. Leaving home requires a considerable and taxing effort. There is a normal inability to leave the home.    Community Physician to provide follow up care: Bernarda Damon D.O.     Optional Interventions? No      I certify the face to face encounter for this home health care referral meets the CMS requirements and the encounter/clinical assessment with the patient was, in whole, or in part, for the medical condition(s) listed above, which is the primary reason for home health care. Based on my clinical findings: the service(s) are medically necessary, support the need for home health care, and the homebound criteria are met.  I certify that this patient has had a face to face encounter by myself.  Kimberly Burr M.D. - [place]: [phone]

## 2018-01-26 NOTE — PROGRESS NOTES
Assumed care of patient at 0700. Patient was emotional and anxious this Am. Emotional support provided. Patient up to chair for breakfast. AM meds discussed and patient agreeable without questions. Patient has call bell in hand, bed low position, and personal belongings within reach.  in place and patient maintaining O2 sat above 90% on home regimin of 5L/NC. Patient requesting feeding tube removal. Will discuss this with physician during rounds.

## 2018-01-26 NOTE — PROGRESS NOTES
Pulmonary Critical Care Progress Note        Chief Complaint: pt admitted with sob, says her oxygen saturations vary due to her underlying disease    History of Present Illness: 57 y.o. female admitted for sob with increasing malaise, fatigue and productive cough with body aches.  Had a flu shot and neg for flu.  Pt is usually on relistor which she ran out of and since has developed constipation and abdominal pain.  Pt initially treated in the ICU with fluids and antibiotics with significant improvement in her oxygenation.  She never required intubation and was initially on high flow NC oxygen but now on her prior NC 5L/min.    Infectious disease was consulted and is decreasing her prior broad antibiotic coverage.  Cultures have been negative and infiltrates on her cxr have improved.      ROS:  Respiratory: positive shortness of breath on chronic oxygen treatment, Cardiac: pulmonary HTN treated, GI: positive constipation.  Hx of gastric bypass with limited tolerated of po intake.    Interval Events:  24 hour interval history reviewed    PFSH:  No change.    Respiratory:     Pulse Oximetry: 94 %            Exam: unlabored respirations, no intercostal retractions or accessory muscle use  ImagingAvailable data reviewed patchy infiltrate mostly on right noted in October 2017  CT-  1.  No thoracic or abdominal aortic aneurysm or dissection.  2.  Findings consistent with pulmonary hypertension.  3.  No CT evidence for pulmonary embolus.  4.  Bilateral lower lobe consolidation, most concerning for pneumonia.  5.  Splenomegaly with splenic cyst again present.  6.  Increased colonic stool.        Invalid input(s): [id number]    HemoDynamics:  Pulse: 67, Heart Rate (Monitored): 70  Blood Pressure: 142/77, NIBP: (!) 166/79       Exam: regular rate and rhythm  Imaging: echo Reviewed   2017 echo  Normal left ventricular size, thickness, systolic function, and   diastolic function.  Left ventricular ejection fraction is  visually estimated to be 65%.  The right ventricle was normal in size and function.  Moderate mitral regurgitation.  Moderate aortic insufficiency.        Neuro:  GCS  15       Exam: no focal deficits noted  Imaging: None - Reviewed    Fluids:  Intake/Output       01/24/18 0700 - 01/25/18 0659 01/25/18 0700 - 01/26/18 0659 01/26/18 0700 - 01/27/18 0659      0700-1859 8866-7281 Total 0700-1859 1900-0659 Total 2509-5246 6298-4443 Total       Intake    P.O.  0  -- 0  120  -- 120  --  -- --    P.O. 0 -- 0 120 -- 120 -- -- --    I.V.  1366.4  1200 2566.4  1700  -- 1700  --  -- --    IV Piggyback Volume (IV Piggyback) -- -- -- 500 -- 500 -- -- --    IV Volume (NS) 1200 1200 2400 1200 -- 1200 -- -- --    IV Volume (zosyn) 166.4 -- 166.4 -- -- -- -- -- --    Other  330  210 540  300  -- 300  --  -- --    Medications (P.O./ Enteral Liquids) 330 210 540 300 -- 300 -- -- --    Enteral  1350  840 2190  770  -- 770  --  -- --    Enteral Volume  350 -- 350 -- -- --    Free Water / Tube Flush 450 90 540 420 -- 420 -- -- --    Total Intake 3046.4 2250 5296.4 2890 -- 2890 -- -- --       Output    Urine  2200  2650 4850  3900  -- 3900  0  -- 0    Number of Times Voided 7 x 6 x 13 x 9 x -- 9 x -- -- --    Void (ml) 2200 2650 4850 3900 -- 3900 0 -- 0    Emesis  --  25 25  --  -- --  --  -- --    Emesis -- 25 25 -- -- -- -- -- --    Emesis - Number of Times -- 1 x 1 x -- -- -- -- -- --    Drains  --  0 0  --  -- --  --  -- --    Residual Amount (ml) (Discarded) -- 0 0 -- -- -- -- -- --    Stool/Urine  --  150 150  200  -- 200  --  -- --    Measurable Stool (ml) -- 150 150 200 -- 200 -- -- --    Stool  --  -- --  --  -- --  --  -- --    Number of Times Stooled 3 x 2 x 5 x 2 x -- 2 x -- -- --    Total Output 2200 2825 5025 4100 -- 4100 0 -- 0       Net I/O     846.4 -575 271.4 -1210 -- -1210 0 -- 0           Recent Labs      01/24/18   0400  01/25/18   0423  01/26/18   0145   SODIUM  140  136  137   POTASSIUM  3.4*  3.3*  3.3*    CHLORIDE  113*  106  102   CO2    23  26   BUN  9  7*  7*   CREATININE  0.73  0.70  0.64   MAGNESIUM  1.5  1.3*   --    PHOSPHORUS  <1.0*  2.1*   --    CALCIUM  8.0*  8.4  8.5       GI/Nutrition:  Exam: abdomen is soft and non-tender  Imaging: Available data reviewed  tube feed Tolerated  Liver Function  Recent Labs      18   0400  18   0423  18   0145   ALTSGPT  17  18  16   ASTSGOT  13  21  16   ALKPHOSPHAT  22*  23*  22*   TBILIRUBIN  0.4  0.3  0.4   GLUCOSE  201*  146*  106*       Heme:  Recent Labs      18   04018   0423  18   0145   RBC  3.99*  3.81*  4.02*   HEMOGLOBIN  11.4*  10.9*  11.5*   HEMATOCRIT  34.5*  31.9*  33.4*   PLATELETCT  69*  62*  60*       Infectious Disease:  Temp  Av.3 °C (99.2 °F)  Min: 36.3 °C (97.3 °F)  Max: 37.8 °C (100 °F)  Micro: no new positive cultures  Recent Labs      18   04018   0423  18   0145   WBC  5.4  4.9  4.3*   NEUTSPOLYS  78.60*  72.40*  70.70   LYMPHOCYTES  9.70*  13.60*  14.70*   MONOCYTES  8.80  9.70  10.90   EOSINOPHILS  1.80  3.30  3.00   BASOPHILS  0.40  0.20  0.20   ASTSGOT  13  21  16   ALTSGPT  17  18  16   ALKPHOSPHAT  22*  23*  22*   TBILIRUBIN  0.4  0.3  0.4     Current Facility-Administered Medications   Medication Dose Frequency Provider Last Rate Last Dose   • potassium bicarbonate (KLYTE) 25 MEQ effervescent tablet TBEF 25 mEq  25 mEq BID Kimberly Burr M.D.       • insulin glargine (LANTUS) injection 15 Units  15 Units Q EVENING Riley Marte M.D.   15 Units at 18   • cefdinir (OMNICEF) capsule 300 mg  300 mg Q12HRS Jaida Epps M.D.   300 mg at 18 0815   • morphine (MS IR) tablet 15 mg  15 mg Q4HRS PRN Riley Marte M.D.   15 mg at 18 0055   • insulin regular (HUMULIN R) injection 2-9 Units  2-9 Units 4X/DAY ACHS Riley Marte M.D.   Stopped at 18 0830    And   • glucose 4 g chewable tablet 16 g  16 g Q15 MIN PRN Riley Bell  CLINT Marte        And   • dextrose 50% (D50W) injection 25 mL  25 mL Q15 MIN PRN Riley Marte M.D.       • methylnaltrexone (RELISTOR) injection 12 mg  12 mg QDAY PRN Riley Marte M.D.   12 mg at 01/26/18 0835   • oxycodone immediate-release (ROXICODONE) tablet 5 mg  5 mg Q6HRS PRN Riley Marte M.D.        Or   • oxycodone immediate release (ROXICODONE) tablet 10 mg  10 mg Q6HRS PRN Riley Marte M.D.   10 mg at 01/26/18 0828   • thiamine (THIAMINE) tablet 100 mg  100 mg DAILY Josemanuel Real M.D.   100 mg at 01/26/18 0814   • enoxaparin (LOVENOX) inj 40 mg  40 mg DAILY Josemanuel Real M.D.   40 mg at 01/26/18 0815   • ipratropium-albuterol (DUONEB) nebulizer solution 3 mL  3 mL Q4H PRN (RT) Terry Bob M.D.   3 mL at 01/20/18 0439   • trazodone (DESYREL) tablet 50 mg  50 mg QHS Racquel Oleary M.D.   50 mg at 01/25/18 2134   • tiotropium (SPIRIVA) 18 MCG inhalation capsule 1 Cap  1 Cap DAILY Racquel Oleary M.D.   1 Cap at 01/26/18 0819   • tacrolimus (PROGRAF) capsule 1.5 mg  1.5 mg BID Racquel Oleary M.D.   1.5 mg at 01/26/18 0819   • sertraline (ZOLOFT) tablet 100 mg  100 mg QHS Racquel Oleary M.D.   100 mg at 01/25/18 2134   • pravastatin (PRAVACHOL) tablet 20 mg  20 mg DAILY Racquel Oleary M.D.   20 mg at 01/26/18 0814   • omeprazole (PRILOSEC) capsule 40 mg  40 mg DAILY Racquel Oleary M.D.   40 mg at 01/26/18 0814   • mycophenolate (CELLCEPT) capsule 500 mg  500 mg BID Racquel Oleary M.D.   500 mg at 01/26/18 0817   • levothyroxine (SYNTHROID) tablet 137 mcg  137 mcg AM ES Racquel Oleary M.D.   137 mcg at 01/26/18 0608   • aspirin EC (ECOTRIN) tablet 81 mg  81 mg DAILY Racquel Oleary M.D.   81 mg at 01/26/18 0814   • senna-docusate (PERICOLACE or SENOKOT S) 8.6-50 MG per tablet 2 Tab  2 Tab BID Racquel Oleary M.D.   2 Tab at 01/26/18 0815    And   • polyethylene glycol/lytes (MIRALAX) PACKET 1 Packet  1 Packet QDAY PRN Racquel Oleary M.D.   1 Packet at 01/20/18  1523    And   • magnesium hydroxide (MILK OF MAGNESIA) suspension 30 mL  30 mL QDAY PRN Racquel Oleary M.D.        And   • bisacodyl (DULCOLAX) suppository 10 mg  10 mg QDAY PRN Racquel Oleary M.D.       • Respiratory Care per Protocol   Continuous RT Racquel Oleary M.D.       • NS infusion   Continuous Racquel Oleary M.D. 100 mL/hr at 01/25/18 1044     • NS (BOLUS) infusion 500 mL  500 mL Once PRN Racquel Oleary M.D.       • ondansetron (ZOFRAN) syringe/vial injection 4 mg  4 mg Q4HRS PRN Racquel Oleary M.D.   4 mg at 01/26/18 0834   • ondansetron (ZOFRAN ODT) dispertab 4 mg  4 mg Q4HRS PRN Racquel Oleary M.D.       • promethazine (PHENERGAN) tablet 12.5-25 mg  12.5-25 mg Q4HRS PRN Racquel Oleary M.D.   25 mg at 01/25/18 1435   • promethazine (PHENERGAN) suppository 12.5-25 mg  12.5-25 mg Q4HRS PRN Racquel Oleary M.D.   25 mg at 01/23/18 0018   • prochlorperazine (COMPAZINE) injection 5-10 mg  5-10 mg Q4HRS PRN Racquel Oleary M.D.   10 mg at 01/26/18 0134   • ambrisentan (LETAIRIS) TABS 10 mg  10 mg DAILY Jeremy M Gonda, M.D.   10 mg at 01/26/18 0900   • alprazolam (XANAX) tablet 1 mg  1 mg TID PRN Jeremy M Gonda, M.D.   1 mg at 01/26/18 0141   • Linaclotide CAPS 1 Cap  1 Cap DAILY Jeremy M Gonda, M.D.   1 Cap at 01/26/18 0900   • predniSONE (DELTASONE) tablet 7 mg  7 mg DAILY Jeremy M Gonda, M.D.   7 mg at 01/26/18 0818   • Tadalafil (PAH) TABS 40 mg  40 mg QHS Jeremy M Gonda, M.D.   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  Antibiotics can be review with ID and continued as outpatient.   She has f[place] [facility]    Time 35 minutes

## 2018-01-26 NOTE — PROGRESS NOTES
PASTOR paged at patient's request regarding transfer to [facility]. Spoke to Amanda, , who stated that a lateral transfer would not be appropriate as patient is about to transfer to lower level of care. Notified patient, and she stated she will discuss it with her doctor tomorrow.

## 2018-01-26 NOTE — DISCHARGE PLANNING
Received page from pts RN in [facility]. Pt aware she is transferring to lower level of care on S5, pt requesting transfer to [facility] (again). See previous admission SW and CM notes.     Relayed to RN to tell pt [facility] lateral transfer was not appropriate this admission as pt is stable to transfer to a medical floor. If  feels specialty is warranted it would have been initiated. Pt can f/u with concerns at [facility] post discharge. Please see previous admission notes in chart review - extensive history medically and socially.

## 2018-01-26 NOTE — PROGRESS NOTES
Patient left via wheelchair with CNA to car. Patient had home oxygen with her. All discharge questions discussed.

## 2018-01-28 NOTE — DOCUMENTATION QUERY
DOCUMENTATION QUERY    PROVIDERS: Please select “Cosign w/ note”to reply to query.    To better represent the severity of illness of your patient, please review the following information and exercise your independent professional judgment in responding to this query.     K 3.3 is noted in the Lab Results . Based upon the clinical findings, risk factors, and treatment, can a diagnosis be provided to support this finding?    • Hypokalemia  • Findings of no clinical significance   • Other explanation of clinical findings  • Unable to determine (no explanation for clinical findings)      The medical record reflects the following:   Clinical Findings  1/24 K 3.4, 1.25 K 3.3, 1.26 K 3.3   Treatment  Potassium phosphates 30 mmol in D5W IVPB, Potassium bicarbonate 25 mEq PO   Risk Factors  nausea, Acute Kidney Injury   Location within medical record  Discharge Summary, Lab Results  and MAR     Thank you,   Jacobo Chao RN, BSN  Clinical   [phone]

## 2018-01-29 ENCOUNTER — HOME CARE VISIT (OUTPATIENT)
Dept: HOME HEALTH SERVICES | Facility: HOME HEALTHCARE | Age: 58
End: 2018-01-29
Payer: MEDICARE

## 2018-01-29 ENCOUNTER — PATIENT OUTREACH (OUTPATIENT)
Dept: HEALTH INFORMATION MANAGEMENT | Facility: OTHER | Age: 58
End: 2018-01-29

## 2018-01-29 PROCEDURE — G0493 RN CARE EA 15 MIN HH/HOSPICE: HCPCS

## 2018-01-29 PROCEDURE — 665001 SOC-HOME HEALTH

## 2018-01-29 SDOH — ECONOMIC STABILITY: HOUSING INSECURITY
HOME SAFETY: TIENT DOES NOT HAVE FLAMMABLE MATERIALS PRESENT IN THE HOME PRESENTING A FIRE HAZARD. NO EVIDENCE FOUND OF SMOKING MATERIALS PRESENT IN THE HOME.

## 2018-01-29 SDOH — ECONOMIC STABILITY: HOUSING INSECURITY: UNSAFE COOKING RANGE AREA: 0

## 2018-01-29 SDOH — ECONOMIC STABILITY: HOUSING INSECURITY
HOME SAFETY: PT HAS EXTENDED O2 TUBING, A CAT, THROW RUGS, NO SHOWER GRAB BARS THAT CREATES A POTENTIAL RISK AS A TRIP/SLIP HAZARD.  OXYGEN SAFETY RISK ASSESSMENT PERFORMED. PATIENT DOES NOT HAVE A NO SMOKING SIGN POSTED IN THE HOME., PT WAS GIVEN A NO SMOKING SI

## 2018-01-29 SDOH — ECONOMIC STABILITY: HOUSING INSECURITY
HOME SAFETY: GN AND PROVIDED EDUCATION ABOUT WHY IT IS IMPORTANT TO PLACE ONE. PATIENT DOES HAVE A WORKING FIRE EXTINGUISHER PRESENT IN THE HOME. SMOKE ALARMS ARE PRESENT AND FUNCTIONAL ON EACH LEVEL OF THE HOME. PATIENT DOES HAVE A FIRE ESCAPE PLAN DEVELOPED. PA

## 2018-01-29 SDOH — ECONOMIC STABILITY: HOUSING INSECURITY: UNSAFE APPLIANCES: 0

## 2018-01-29 ASSESSMENT — ENCOUNTER SYMPTOMS: DEBILITATING PAIN: 1

## 2018-01-29 ASSESSMENT — ACTIVITIES OF DAILY LIVING (ADL)
HOME_HEALTH_OASIS: 01
HOME_HEALTH_OASIS: 00
OASIS_M1830: 00

## 2018-01-30 ENCOUNTER — PATIENT OUTREACH (OUTPATIENT)
Dept: HEALTH INFORMATION MANAGEMENT | Facility: OTHER | Age: 58
End: 2018-01-30

## 2018-01-30 ENCOUNTER — TELEPHONE (OUTPATIENT)
Dept: HEALTH INFORMATION MANAGEMENT | Facility: OTHER | Age: 58
End: 2018-01-30

## 2018-01-30 ENCOUNTER — APPOINTMENT (OUTPATIENT)
Dept: MEDICAL GROUP | Facility: CLINIC | Age: 58
End: 2018-01-30
Payer: MEDICARE

## 2018-01-30 RX ORDER — BLOOD-GLUCOSE METER
KIT MISCELLANEOUS
Refills: 2 | COMMUNITY
Start: 2017-12-24 | End: 2018-03-27 | Stop reason: SDUPTHER

## 2018-01-30 NOTE — TELEPHONE ENCOUNTER
Medications reviewed against discharge summary. Interaction noted between ondansetron and tacrolimus / trazodone for increased risk of qt prolongation. Recommendation is to monitor therapy. Per chart review, patient has been on this combination for years. Interaction noted between cyclobenzaprine and sertraline for increased risk of serotonin syndrome. Recommendation is to monitor therapy. Per chart review, patient has been on this combination for years. Interaction identified between mycophenolate and omeprazole. PPI could reduce serum concentration of mycophenolate. Interaction identified between tacrolimus and omeprazole. PPI could increase serum concentration of tacrolimus. Recommended to monitor therapy. Per chart review, patient has been on this combination of medications for years. Interaction identified between levothyroxine and calcium/ ferrous sulfate. Outbound call to [facility] to advise patient to separate levothyroxine from these agents by at least 4 hours. Patient verbalizes understanding. Patient aware of signs/symptoms of serotonin syndrome. Patient monitors her vitals daily and follows with cardiologist. Patient had complaint regarding care [facility]. [facility]     Cheli Petit, Rosa IselaD

## 2018-01-30 NOTE — PROGRESS NOTES
Received referral from [facility] for med rec. Medications reviewed against discharge summary. Interaction noted between ondansetron and tacrolimus / trazodone for increased risk of qt prolongation. Recommendation is to monitor therapy. Per chart review, patient has been on this combination for years. Interaction noted between cyclobenzaprine and sertraline for increased risk of serotonin syndrome. Recommendation is to monitor therapy. Per chart review, patient has been on this combination for years. Interaction identified between mycophenolate and omeprazole. PPI could reduce serum concentration of mycophenolate. Interaction identified between tacrolimus and omeprazole. PPI could increase serum concentration of tacrolimus. Recommended to monitor therapy. Per chart review, patient has been on this combination of medications for years. Interaction identified between levothyroxine and calcium/ ferrous sulfate. Outbound call to [facility] to advise patient to separate levothyroxine from these agents by at least 4 hours. Patient verbalizes understanding. Patient aware of signs/symptoms of serotonin syndrome. Patient monitors her vitals daily and follows with cardiologist. Patient had complaint regarding care received at [facility]. Submitted [facility] report.     Cheli Petit, Rosa IselaD

## 2018-01-31 ENCOUNTER — HOME CARE VISIT (OUTPATIENT)
Dept: HOME HEALTH SERVICES | Facility: HOME HEALTHCARE | Age: 58
End: 2018-01-31
Payer: MEDICARE

## 2018-01-31 VITALS
SYSTOLIC BLOOD PRESSURE: 128 MMHG | WEIGHT: 175.2 LBS | DIASTOLIC BLOOD PRESSURE: 74 MMHG | HEART RATE: 69 BPM | TEMPERATURE: 99.2 F | RESPIRATION RATE: 16 BRPM | BODY MASS INDEX: 25.95 KG/M2 | HEIGHT: 69 IN

## 2018-02-02 ENCOUNTER — HOME CARE VISIT (OUTPATIENT)
Dept: HOME HEALTH SERVICES | Facility: HOME HEALTHCARE | Age: 58
End: 2018-02-02
Payer: MEDICARE

## 2018-02-02 PROCEDURE — G0299 HHS/HOSPICE OF RN EA 15 MIN: HCPCS

## 2018-02-03 VITALS
RESPIRATION RATE: 18 BRPM | TEMPERATURE: 98.2 F | OXYGEN SATURATION: 95 % | HEART RATE: 68 BPM | SYSTOLIC BLOOD PRESSURE: 110 MMHG | DIASTOLIC BLOOD PRESSURE: 58 MMHG

## 2018-02-03 SDOH — ECONOMIC STABILITY: HOUSING INSECURITY: UNSAFE COOKING RANGE AREA: 0

## 2018-02-03 SDOH — ECONOMIC STABILITY: HOUSING INSECURITY: UNSAFE APPLIANCES: 0

## 2018-02-03 ASSESSMENT — ENCOUNTER SYMPTOMS
VOMITING: DENIES
NAUSEA: DENIES

## 2018-02-05 ENCOUNTER — HOME CARE VISIT (OUTPATIENT)
Dept: HOME HEALTH SERVICES | Facility: HOME HEALTHCARE | Age: 58
End: 2018-02-05
Payer: MEDICARE

## 2018-02-05 PROCEDURE — G0495 RN CARE TRAIN/EDU IN HH: HCPCS

## 2018-02-05 ASSESSMENT — ENCOUNTER SYMPTOMS: SHORTNESS OF BREATH: T

## 2018-02-05 NOTE — TELEPHONE ENCOUNTER
Was the patient seen in the last year in this department? Yes     Does patient have an active prescription for medications requested? No     Received Request Via: Pharmacy      Pt met protocol?: Yes, OV 12/17, med entered as historical

## 2018-02-06 ENCOUNTER — OFFICE VISIT (OUTPATIENT)
Dept: MEDICAL GROUP | Facility: PHYSICIAN GROUP | Age: 58
End: 2018-02-06
Payer: MEDICARE

## 2018-02-06 ENCOUNTER — HOSPITAL ENCOUNTER (OUTPATIENT)
Dept: LAB | Facility: MEDICAL CENTER | Age: 58
End: 2018-02-06
Attending: INTERNAL MEDICINE
Payer: MEDICARE

## 2018-02-06 VITALS
BODY MASS INDEX: 24.66 KG/M2 | DIASTOLIC BLOOD PRESSURE: 62 MMHG | SYSTOLIC BLOOD PRESSURE: 122 MMHG | HEART RATE: 86 BPM | TEMPERATURE: 98.8 F | OXYGEN SATURATION: 96 % | WEIGHT: 167 LBS | RESPIRATION RATE: 14 BRPM

## 2018-02-06 DIAGNOSIS — K59.03 DRUG-INDUCED CONSTIPATION: ICD-10-CM

## 2018-02-06 DIAGNOSIS — R73.9 STEROID-INDUCED HYPERGLYCEMIA: ICD-10-CM

## 2018-02-06 DIAGNOSIS — I27.20 PULMONARY HYPERTENSION (HCC): ICD-10-CM

## 2018-02-06 DIAGNOSIS — J96.11 CHRONIC RESPIRATORY FAILURE WITH HYPOXIA (HCC): ICD-10-CM

## 2018-02-06 DIAGNOSIS — K59.09 CHRONIC CONSTIPATION: ICD-10-CM

## 2018-02-06 DIAGNOSIS — Z79.891 CHRONIC USE OF OPIATE DRUGS THERAPEUTIC PURPOSES: ICD-10-CM

## 2018-02-06 DIAGNOSIS — G89.4 CHRONIC PAIN SYNDROME: ICD-10-CM

## 2018-02-06 DIAGNOSIS — J69.0 ASPIRATION PNEUMONIA OF BOTH LOWER LOBES DUE TO GASTRIC SECRETIONS (HCC): ICD-10-CM

## 2018-02-06 DIAGNOSIS — K44.9 HIATAL HERNIA: ICD-10-CM

## 2018-02-06 DIAGNOSIS — T38.0X5A STEROID-INDUCED HYPERGLYCEMIA: ICD-10-CM

## 2018-02-06 DIAGNOSIS — F41.9 ANXIETY: ICD-10-CM

## 2018-02-06 DIAGNOSIS — D84.9 IMMUNOCOMPROMISED STATE (HCC): ICD-10-CM

## 2018-02-06 PROCEDURE — 99214 OFFICE O/P EST MOD 30 MIN: CPT | Performed by: FAMILY MEDICINE

## 2018-02-06 RX ORDER — GABAPENTIN 600 MG/1
600 TABLET ORAL 3 TIMES DAILY
Qty: 90 TAB | Refills: 0 | Status: SHIPPED | OUTPATIENT
Start: 2018-02-06 | End: 2018-03-01 | Stop reason: SDUPTHER

## 2018-02-06 RX ORDER — ALPRAZOLAM 1 MG/1
1 TABLET ORAL 3 TIMES DAILY
Qty: 90 TAB | Refills: 0 | Status: SHIPPED | OUTPATIENT
Start: 2018-03-01 | End: 2018-02-27 | Stop reason: SDUPTHER

## 2018-02-06 RX ORDER — PREDNISONE 1 MG/1
1 TABLET ORAL DAILY
COMMUNITY
End: 2018-02-28

## 2018-02-06 NOTE — PROGRESS NOTES
CC: constipation, prescription refills    HISTORY OF PRESENT ILLNESS: Patient is a 57 y.o. female established patient who presents today to establish care in this office.        Aspiration pneumonia of both lower lobes due to gastric secretions (CMS-HCC)  She has had multiple hospitalizations for pneumonia at [facility].  Her last hospitalization was 1/2018.  She is following with her pulmonologist at [facility] and had a recent CAT scan.  She thinks this is due a hiatal hernia and wants to have it repaired at [facility].    Hiatal hernia  She was seen by Dr. Ganser in [place] and planning for repair due to aspiration pneumonia.  She requests referral to her surgeon at [facility] who did her gastric sleeve.    Drug induced constipation  Due to chronic use of pain medication.  She reports that her constipation has been refractory to other stool softeners and that her last PCP Dr. Reyes prescribed methylnaltrexone which worked.  She requests another prescription of this.    Anxiety  She has chronic anxiety for which she is treated with xanax.  Her symptoms are generalized and include agorophobia as she mentions she does not leave the house.  Her PCP was prescribing this but has left the practice and sent her a referral for psychiatry.  She has an appointment with Psychiatry in April.  She reports that she will run out of Xanax next month.      Chronic pain  She has an appointment with pain management in the next month.     Pulmonary hypertension  She is following with pulmonology regarding this.  Her last echo in 2015 showed RVSP of 16 mmHg. She is on tadalafil and oxygen.      Chronic pain syndrome  She has chronic back, abdominal and left foot pain for which she is taking oxycodone 10 mg three times a day.  This was being prescribed by her previous PCP who then sent a referral to pain management.  Roxana has an appointment with pain management in the next month.    Steroid-induced hyperglycemia  She has a history of diabetes mellitis for  which she was on insulin.  She continues to use Lantus when her blood glucose is high.  Her last HgA1c was 6.6 in 10/2017 which is consistent with diabetes.      Immunocompromised state (CMS-[facility])  She continues on antirejection drugs and chronic steroids due to her history of liver transplant and sarcoidosis.  She does not have any current symptoms of infection.    Chronic respiratory failure (CMS-[facility])  She wears oxygen constantly due to her sarcoidosis and pulmonary hypertension.  She follows with a pulmonologist in [place] and in [place].  Her saturation on oxygen is acceptable today.      Patient Active Problem List    Diagnosis Date Noted   • Pure hypercholesterolemia 12/09/2014     Priority: High   • Mild aortic regurgitation and aortic valve sclerosis 03/17/2014     Priority: High   • [place]ocom[place] 11/14/2015     Priority: Medium   • Chronic respiratory failure (CMS-HCC) 11/13/2014     Priority: Medium   • Vitamin D deficiency 08/26/2014     Priority: Medium   • Ostium secundum type atrial septal defect, S/P percutaneous closure 03/17/2014     Priority: Medium   • Hepatopulmonary syndrome (CMS-HCC) 03/05/2014     Priority: Medium   • CKD (chronic kidney disease) stage 3, GFR 30-59 ml/min- unclear cause, probably multifactorial 02/25/2014     Priority: Medium   • Chronic obstructive pulmonary disease with acute exacerbation (CMS-HCC) 11/14/2013     Priority: Medium   • MGUS (monoclonal gammopathy of unknown significance) 11/14/2013     Priority: Medium   • Pancytopenia (CMS-HCC) 09/13/2013     Priority: Medium   • History of pancreatitis 06/20/2012     Priority: Medium   • Pulmonary hypertension 02/03/2015     Priority: Low   • Sarcoidosis (CMS-HCC) 11/14/2013     Priority: Low   • Anxiety 11/04/2013     Priority: Low   • Insomnia 11/04/2013     Priority: Low   • Lower extremity weakness 06/26/2013     Priority: Low   • Status post liver transplant Dr. [place] ([facility]) 03/13/2012      Priority: Low   • Hypothyroidism, adult 03/13/2012     Priority: Low   • Aspiration pneumonia of both lower lobes due to gastric secretions (CMS-HCC) 02/06/2018   • Hiatal hernia 02/06/2018   • Drug induced constipation 02/06/2018   • Chronic pain 01/23/2018   • Thrombocytopenia (CMS-HCC) 01/21/2018   • [place]'s disease (CMS-HCC) 11/03/2017   • Drug-induced constipation 11/03/2017   • S/P panniculectomy 10/31/2017   • Steroid-induced hyperglycemia 10/03/2017   • Depression 07/14/2017   • SBO (small bowel obstruction) 07/14/2017   • History of Clostridium difficile infection 07/14/2017   • VRE (vancomycin-resistant Enterococci) 07/14/2017   • Herpes zoster without complication 07/14/2017   • IDDM (insulin dependent diabetes mellitus) (CMS-HCC) 06/23/2017   • Chronic prescription benzodiazepine use 04/12/2017   • Chronic use of opiate drugs therapeutic purposes 04/12/2017   • Mood disorder (CMS-HCC) 03/22/2017   • 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   • Iron deficiency 01/20/2017   • GERD (gastroesophageal reflux disease) 01/04/2017   • Bradycardia 11/04/2016   • Other allergic rhinitis 07/22/2016   • Chronic pain syndrome 06/15/2016   • Back pain 02/22/2016   • Splenic lesion 11/14/2015   • Mild mitral regurgitation 07/14/2015   • Splenomegaly 07/14/2015   • On prednisone therapy 07/06/2015   • Chronic generalized abdominal pain 03/17/2015   • H/O non-ST elevation myocardial infarction (NSTEMI) 05/16/2013      Allergies:Rhubarb; Organic nitrates; and Vancomycin hcl    Current Outpatient Prescriptions   Medication Sig Dispense Refill   • gabapentin (NEURONTIN) 600 MG tablet Take 1 Tab by mouth 3 times a day. 90 Tab 0   • predniSONE (DELTASONE) 1 MG Tab Take 1 mg by mouth every day.     • methylnaltrexone (RELISTOR) 12 MG/0.6ML Solution Inject 0.6 mL as instructed 1 time daily as needed for up to 30 days. 30 mL 2   • [START ON 3/1/2018] ALPRAZolam (XANAX) 1 MG Tab Take 1 Tab by mouth 3 times a day  for 30 days. 90 Tab 0   • FREESTYLE LITE strip USE TO TEST EVERY DAY AS NEEDED SSX HIGH OR LOW  2   • polyethylene glycol 3350 (MIRALAX) Powder Take 17 g by mouth every day.     • NYSTOP (MYCOSTATIN) 489487 UNIT/GM Powder Apply 1 g to affected area(s) 3 times a day as needed (skin irritation).     • docusate sodium (COLACE) 100 MG Cap Take 100 mg by mouth 2 times a day as needed for Constipation.     • sennosides (SENOKOT) 8.6 MG Tab Take 8.6 mg by mouth 1 time daily as needed (constipation).     • Carboxymethylcell-Hypromellose (GENTEAL) 0.25-0.3 % Gel Place 0.3 mg in both eyes every day at 6 PM.     • Wheat Dextrin (BENEFIBER DRINK MIX PO) Take 7.4 g by mouth every day at 6 PM.     • non-formulary med Inhale 5 L/min by mouth Continuous. oxygen x nasal cannula     • predniSONE (DELTASONE) 5 MG Tab Take 5 mg by mouth every day. Total dose = 7 mg     • predniSONE (DELTASONE) 1 MG Tab Take 2 mg by mouth every day. Total dose = 7 mg     • furosemide (LASIX) 40 MG Tab TAKE 1 TAB BY MOUTH EVERY DAY. 30 Tab 6   • [START ON 2/15/2018] oxycodone immediate release (ROXICODONE) 10 MG immediate release tablet Take 1 Tab by mouth 3 times a day as needed for Severe Pain for up to 30 days. 90 Tab 0   • lactulose 10 GM/15ML Solution Take 30 mL by mouth 2 times a day. 1 Bottle 3   • Linaclotide (LINZESS) 290 MCG Cap Take 1 Cap by mouth every day. 90 Cap 3   • trazodone (DESYREL) 50 MG Tab TAKE 1-2 TABS BY MOUTH EVERY BEDTIME. 180 Tab 3   • aspirin 81 MG tablet Take 1 Tab by mouth every day. 90 Tab 3   • sertraline (ZOLOFT) 100 MG Tab Take 1 Tab by mouth every bedtime. 90 Tab 4   • fluticasone (FLONASE) 50 MCG/ACT nasal spray SPRAY 1 SPRAY IN EACH NOSTRIL TWICE A DAY 48 g 3   • levothyroxine (SYNTHROID) 137 MCG Tab Take 1 Tab by mouth Every morning on an empty stomach. 90 Tab 3   • omeprazole (PRILOSEC) 40 MG delayed-release capsule Take 1 Cap by mouth every day. 90 Cap 3   • ACIDOPHILUS LACTOBACILLUS PO Take 1 Capsule by mouth  every day.     • tacrolimus (PROGRAF) 1 MG Cap TAKE 1 CAPSULE BY MOUTH EVERY MORNING AND 1 CAP IN THE EVENING-TAKE WITH 0.5MG  11   • morphine (MS IR) 15 MG tablet Take 1 Tab by mouth every four hours as needed for Severe Pain. 30 Tab 0   • NON SPECIFIED Take 1 Cap by mouth every evening. Bariatric Dietary Supplment      • tacrolimus (PROGRAF) 0.5 MG Cap Take 0.5 mg by mouth 2 Times a Day. Takes with 1 mg tacrolimus BID     • ambrisentan (LETAIRIS) 10 MG tablet Take 1 Tab by mouth every day. 30 Tab 11   • pravastatin (PRAVACHOL) 20 MG Tab TAKE 1 TAB BY MOUTH EVERY DAY. 30 Tab 11   • Tadalafil, PAH, (ADCIRCA) 20 MG Tab Take 40 mg by mouth every bedtime. Indications: Pulmonary Arterial Hypertension 180 Tab 3   • cyclobenzaprine (FLEXERIL) 10 MG Tab Take 10 mg by mouth 3 times a day as needed for Muscle Spasms.     • ferrous sulfate (FEOSOL) 220 (44 FE) MG/5ML Elixir Take 5 mL by mouth every day. 1 Bottle 10   • CITRACAL MAXIMUM 315-250 MG-UNIT Tab TAKE 2 TABS BY MOUTH 2X/ DAY WITH FOOD BEFORE AND AFTER DINNER FOR LIFE-CRUSH 1ST 3 MONTH, SPACE  Tab 11   • ascorbic acid (ASCORBIC ACID) 500 MG Tab Take 500 mg by mouth every day.     • ondansetron (ZOFRAN ODT) 4 MG TABLET DISPERSIBLE Take 1 Tab by mouth every 8 hours as needed for Nausea/Vomiting. Nausea and vomiting 270 Tab 4   • tiotropium (SPIRIVA) 18 MCG Cap Inhale 1 Cap by mouth every day. 90 Cap 4   • mycophenolate (CELLCEPT) 250 MG Cap Take 500 mg by mouth 2 times a day.     • cefdinir (OMNICEF) 300 MG Cap Take 1 Cap by mouth every 12 hours for 12 days. (Patient not taking: Reported on 2/6/2018) 24 Cap 0   • bisacodyl (DULCOLAX) 10 MG Suppos Insert 1 Suppository in rectum 1 time daily as needed (constipation). (Patient not taking: Reported on 2/6/2018) 30 Suppository 3     No current facility-administered medications for this visit.        Social History   Substance Use Topics   • Smoking status: Never Smoker   • Smokeless tobacco: Never Used      Comment:  avoid all tobacco products   • Alcohol use No      Comment: 05/2009 quit drinking     Social History     Social History Narrative   • No narrative on file       Family History   Problem Relation Age of Onset   • Other Father      Unknown (dead 10 years)   • Diabetes Father    • Heart Disease Father    • Hypertension Father    • Hyperlipidemia Father    • Stroke Father    • Non-contributory Mother    • Hyperlipidemia Mother    • Alcohol/Drug Mother    • Cancer Paternal Aunt    • Alcohol/Drug Maternal Grandmother    • Alcohol/Drug Maternal Grandfather        Review of Systems:      - Constitutional: Negative for fever, chills, unexpected weight change, and fatigue/generalized weakness.         Exam:    Blood pressure 122/62, pulse 86, temperature 37.1 °C (98.8 °F), resp. rate 14, weight 75.8 kg (167 lb), last menstrual period 01/03/2000, SpO2 96 %. Body mass index is 24.66 kg/m².    General:  Well nourished, well developed female in NAD  Head is grossly normal.    Assessment/Plan:  1. Aspiration pneumonia of both lower lobes due to gastric secretions (CMS-HCC)  Her recent CT was reviewed showing bibasilar infiltrates consistent with pneumonia. Her consult note with Dr. Ganser with [facility] was reviewed and repair of her hiatal hernia is recommended.  Roxana would like to see a surgeon in [place] as he was the surgeon who performed her gastric sleeve.   A referral was sent for this.  - REFERRAL TO BARIATRIC SURGERY    2. Hiatal hernia  This may be the source of her recurrent aspiration pneumonias.  She does have a swallow study planned this week.  She will be referred to the surgeon in [place] at her request.  - REFERRAL TO BARIATRIC SURGERY    3. Chronic constipation  She has chronic constipation likely due to daily use of opiate medications.  She has previously been prescribed methylnaltrexone which is the only thing that works.  A refill was sent at her request to a fax number provided.  -  methylnaltrexone (RELISTOR) 12 MG/0.6ML Solution; Inject 0.6 mL as instructed 1 time daily as needed for up to 30 days.  Dispense: 30 mL; Refill: 2    4. Anxiety  Her anxiety is significant and is currently only treated with xanax.  I explained to her the office policy that she be on only one controlled substance at a time in order for prescription in this office to be reasonable.  With her lung disease use of opiates and benzodiazepines is at particular increased risk of respiratory complication which was discussed.  She wishes to continue use of both controlled substances and was advised that keeping her appointment with the psychiatrist will be important.  A one time refill of xanax was given to last until her psychiatry appointment to prevent withdrawal but she was advised that further refills of this medication will not be coming from this office even if she misses the appointment and she agrees with this plan.  A contract was signed with her and the risks of the medication and expectations for use were reviewed.  - ALPRAZolam (XANAX) 1 MG Tab; Take 1 Tab by mouth 3 times a day for 30 days.  Dispense: 90 Tab; Refill: 0  - Controlled Substance Treatment Agreement    5. Chronic use of opiate drugs therapeutic purposes  She is on chronic opiates for chronic pain.  Due to her use of multiple controlled substances prescribing will be taken over by pain management as deemed appropriate after her visit.  - methylnaltrexone (RELISTOR) 12 MG/0.6ML Solution; Inject 0.6 mL as instructed 1 time daily as needed for up to 30 days.  Dispense: 30 mL; Refill: 2    6. Drug-induced constipation  - methylnaltrexone (RELISTOR) 12 MG/0.6ML Solution; Inject 0.6 mL as instructed 1 time daily as needed for up to 30 days.  Dispense: 30 mL; Refill: 2    7. Chronic pain syndrome  Chronic pain in multiple locations due to splenomegaly, scoliosis and left foot post traumatic pain.  She prefers to continue managing this with opiates and will  follow with pain management.    8. Steroid-induced hyperglycemia  This appears to be in the diabetes range.  We will repeat a HgA1c and check back in 3 months.    - HEMOGLOBIN A1C; Future    9. Immunocompromised state (CMS-HCC)  This is due to antirejection therapy.  She will continue to follow with her specialists.  There are no signs of infection today.    10. Chronic respiratory failure with hypoxia (CMS-HCC)  Due to sarcoidosis and pulmonary hypertension managed with supplemental oxygen.  She will continue to follow with pulmonology who prescribes the medications for these conditions.    11. Pulmonary hypertension      12. IDDM (insulin dependent diabetes mellitus) (CMS-HCC)         Total 30 minutes face-to-face time spent with patient, with greater than 50% of the total time discussing patient's issues and symptoms as listed above in assessment and plan, as well as managing coordination of care for future evaluation and treatment.

## 2018-02-06 NOTE — ASSESSMENT & PLAN NOTE
She has chronic back, abdominal and left foot pain for which she is taking oxycodone 10 mg three times a day.  This was being prescribed by her previous PCP who then sent a referral to pain management.  Roxana has an appointment with pain management in the next month.

## 2018-02-06 NOTE — ASSESSMENT & PLAN NOTE
She wears oxygen constantly due to her sarcoidosis and pulmonary hypertension.  She follows with a pulmonologist in [place] and in [place].  Her saturation on oxygen is acceptable today.

## 2018-02-06 NOTE — ASSESSMENT & PLAN NOTE
She has a history of diabetes mellitis for which she was on insulin.  She continues to use Lantus when her blood glucose is high.  Her last HgA1c was 6.6 in 10/2017 which is consistent with diabetes.

## 2018-02-06 NOTE — ASSESSMENT & PLAN NOTE
She continues on antirejection drugs and chronic steroids due to her history of liver transplant and sarcoidosis.  She does not have any current symptoms of infection.

## 2018-02-06 NOTE — ASSESSMENT & PLAN NOTE
She is following with pulmonology regarding this.  Her last echo in 2015 showed RVSP of 16 mmHg. She is on tadalafil and oxygen.

## 2018-02-06 NOTE — ASSESSMENT & PLAN NOTE
Due to chronic use of pain medication.  She reports that her constipation has been refractory to other stool softeners and that her last PCP Dr. Reyes prescribed methylnaltrexone which worked.  She requests another prescription of this.

## 2018-02-06 NOTE — ASSESSMENT & PLAN NOTE
She has had multiple hospitalizations for pneumonia at [facility].  Her last hospitalization was 1/2018.  She is following with her pulmonologist at [facility] and had a recent CAT scan.  She thinks this is due a hiatal hernia and wants to have it repaired at [facility].

## 2018-02-06 NOTE — ASSESSMENT & PLAN NOTE
She was seen by Dr. Ganser in [place] and planning for repair due to aspiration pneumonia.  She requests referral to her surgeon at [facility] who did her gastric sleeve.

## 2018-02-06 NOTE — TELEPHONE ENCOUNTER
Pt called stating that she needs this Rx to be changed to a qty of 30 because this comes in prefilled syringes. It currently states that she will get 18 for a 30 day supply but that I for the multidose vial and pt is receiving pre-filled syringes.  Please advise change to qty 30    Thank you  .

## 2018-02-06 NOTE — ASSESSMENT & PLAN NOTE
She has chronic anxiety for which she is treated with xanax.  Her symptoms are generalized and include agorophobia as she mentions she does not leave the house.  Her PCP was prescribing this but has left the practice and sent her a referral for psychiatry.  She has an appointment with Psychiatry in April.  She reports that she will run out of Xanax next month.

## 2018-02-07 ENCOUNTER — HOSPITAL ENCOUNTER (OUTPATIENT)
Dept: LAB | Facility: MEDICAL CENTER | Age: 58
End: 2018-02-07
Attending: FAMILY MEDICINE
Payer: MEDICARE

## 2018-02-07 ENCOUNTER — HOSPITAL ENCOUNTER (OUTPATIENT)
Dept: LAB | Facility: MEDICAL CENTER | Age: 58
End: 2018-02-07
Attending: INTERNAL MEDICINE
Payer: MEDICARE

## 2018-02-07 ENCOUNTER — HOSPITAL ENCOUNTER (OUTPATIENT)
Dept: RADIOLOGY | Facility: MEDICAL CENTER | Age: 58
End: 2018-02-07
Attending: FAMILY MEDICINE
Payer: MEDICARE

## 2018-02-07 VITALS
DIASTOLIC BLOOD PRESSURE: 78 MMHG | OXYGEN SATURATION: 99 % | SYSTOLIC BLOOD PRESSURE: 118 MMHG | HEART RATE: 76 BPM | TEMPERATURE: 97.7 F | RESPIRATION RATE: 18 BRPM

## 2018-02-07 DIAGNOSIS — R13.10 DYSPHAGIA, UNSPECIFIED TYPE: ICD-10-CM

## 2018-02-07 DIAGNOSIS — J69.0 ASPIRATION PNEUMONIA, UNSPECIFIED ASPIRATION PNEUMONIA TYPE, UNSPECIFIED LATERALITY, UNSPECIFIED PART OF LUNG (HCC): ICD-10-CM

## 2018-02-07 DIAGNOSIS — T38.0X5A STEROID-INDUCED HYPERGLYCEMIA: ICD-10-CM

## 2018-02-07 DIAGNOSIS — R73.9 STEROID-INDUCED HYPERGLYCEMIA: ICD-10-CM

## 2018-02-07 LAB
BASOPHILS # BLD AUTO: 1.5 % (ref 0–1.8)
BASOPHILS # BLD: 0.06 K/UL (ref 0–0.12)
EOSINOPHIL # BLD AUTO: 0.2 K/UL (ref 0–0.51)
EOSINOPHIL NFR BLD: 4.9 % (ref 0–6.9)
ERYTHROCYTE [DISTWIDTH] IN BLOOD BY AUTOMATED COUNT: 46.5 FL (ref 35.9–50)
HCT VFR BLD AUTO: 40.3 % (ref 37–47)
HGB BLD-MCNC: 12.5 G/DL (ref 12–16)
IMM GRANULOCYTES # BLD AUTO: 0.01 K/UL (ref 0–0.11)
IMM GRANULOCYTES NFR BLD AUTO: 0.2 % (ref 0–0.9)
LYMPHOCYTES # BLD AUTO: 1.28 K/UL (ref 1–4.8)
LYMPHOCYTES NFR BLD: 31.4 % (ref 22–41)
MCH RBC QN AUTO: 27.6 PG (ref 27–33)
MCHC RBC AUTO-ENTMCNC: 31 G/DL (ref 33.6–35)
MCV RBC AUTO: 89 FL (ref 81.4–97.8)
MONOCYTES # BLD AUTO: 0.34 K/UL (ref 0–0.85)
MONOCYTES NFR BLD AUTO: 8.3 % (ref 0–13.4)
NEUTROPHILS # BLD AUTO: 2.19 K/UL (ref 2–7.15)
NEUTROPHILS NFR BLD: 53.7 % (ref 44–72)
NRBC # BLD AUTO: 0 K/UL
NRBC BLD-RTO: 0 /100 WBC
PLATELET # BLD AUTO: 124 K/UL (ref 164–446)
PMV BLD AUTO: 8.7 FL (ref 9–12.9)
RBC # BLD AUTO: 4.53 M/UL (ref 4.2–5.4)
WBC # BLD AUTO: 4.1 K/UL (ref 4.8–10.8)

## 2018-02-07 PROCEDURE — 83036 HEMOGLOBIN GLYCOSYLATED A1C: CPT

## 2018-02-07 PROCEDURE — 83735 ASSAY OF MAGNESIUM: CPT

## 2018-02-07 PROCEDURE — 80053 COMPREHEN METABOLIC PANEL: CPT

## 2018-02-07 PROCEDURE — 85025 COMPLETE CBC W/AUTO DIFF WBC: CPT

## 2018-02-07 PROCEDURE — 74230 X-RAY XM SWLNG FUNCJ C+: CPT

## 2018-02-07 PROCEDURE — 80197 ASSAY OF TACROLIMUS: CPT

## 2018-02-07 PROCEDURE — 36415 COLL VENOUS BLD VENIPUNCTURE: CPT

## 2018-02-07 PROCEDURE — 82977 ASSAY OF GGT: CPT

## 2018-02-07 PROCEDURE — 92611 MOTION FLUOROSCOPY/SWALLOW: CPT

## 2018-02-07 SDOH — ECONOMIC STABILITY: HOUSING INSECURITY: UNSAFE COOKING RANGE AREA: 0

## 2018-02-07 SDOH — ECONOMIC STABILITY: HOUSING INSECURITY: UNSAFE APPLIANCES: 0

## 2018-02-07 NOTE — OP THERAPY EVALUATION
"[facility]  Speech-Language Pathology Department  Modified Barium Swallow Study Summary    Patient: Roxana Cuba     Date of Evaluation:  2/7/2018    Referring MD: Bernarda Damon       Patient History/Reason for Referral:  Pt with history of aspiration pna, known significant reflux and hiatal hernia.  Hoping for surgery soon at [facility].  Pt notes she is on a regular diet with thin liquids.  She has to \"think about\" swallowing.  Recent hospitalization due to acute respiratory failure.  Pt is currently on 5 liters of 02. Most recent CXR from 1/20/18 noted bilateral atelectasis and left LL pna.     Procedure Results:  The examination was conducted with videofluoroscopy from a   Lateral and Anterior view.  The following textures were presented:   Applesauce, Diced Fruit, Cookie, Thin Liquid and Nectar Thick Liquid       The following observations were made:    Oral Phase Puree Thick Liquids Thin Liquids Mechanical Soft Solid   Anterior spillage        Residue in oral cavity after the swallow WFL    WFL   Decreased mastication        Loss of bolus control  x x x    Piecemeal deglutition        Slow oral transit time        Premature spillage into the valleculae  x  x    Premature spillage into the pyriform sinus   x x       Other Observations:         Pharyngeal Phase Puree Thick Liquids Thin Liquids Mechanical Soft Solid   Delayed triggering of the pharyngeal swallow         Absent initiation of swallow        Residue on tongue base        Residue on posterior pharyngeal wall        Residue in valleculae   x     Residue in pyriform sinus Residue after the swallow-backflow x x x Ok on initial sawllow, noted from backflow   Penetration  flash x juicex2    Aspiration          Other Observations:  Decreased control of liquids and mixed consistencies.  Attempted 2 second oral prep and chin tuck, neither were effective.  Mild pharyngeal weakness.  Of greater concern is backflow up to pyriform sinuses " causing residue after the swallow, appearing between swallows.  Pt notes she is very aware of backflow and can feel it.                             Esophageal Phase:  A-P view completed with puree, slow motility in upper esophagus with reflux up to UES.                                 Impressions:  Oral-pharyngeal-esophageal dysphagia characterized by decreased bolus control of liquid and mixed consistencies resulting in penetration during the swallow. Pt states she does feel like she needs to clear her throat but did not spontaneously.  Flash penetration only with nectar thick liquids.  Effortful/thoughtful swallow noted.  Had no problem with cohesive foods during initial swallow however all consistencies appear to reflux back up with noted pyriform sinus residue that is not noted during the swallow.          Recommendations: Diet:  Soft-cohesive foods- avoid mixed consistencies       Liquid:  Nectar thick with meals.  Small sips of thin water between meals    after good oral  care                                        Medications:  Float whole in puree        Compensatory Strategies:  1.  Double swallow each bite/sip  2.  Clear throat every 2-3 bites/sips and re-swallow  3.  Hard-fast swallow            Medicare only:                    Thank you for the referral.    For further questions, please call [phone].        Katarina Pablo MA, CCC-SLP   Speech-Language Pathologist

## 2018-02-08 DIAGNOSIS — K59.03 DRUG-INDUCED CONSTIPATION: ICD-10-CM

## 2018-02-08 LAB
ALBUMIN SERPL BCP-MCNC: 4.4 G/DL (ref 3.2–4.9)
ALBUMIN/GLOB SERPL: 1.8 G/DL
ALP SERPL-CCNC: 30 U/L (ref 30–99)
ALT SERPL-CCNC: 14 U/L (ref 2–50)
ANION GAP SERPL CALC-SCNC: 6 MMOL/L (ref 0–11.9)
AST SERPL-CCNC: 17 U/L (ref 12–45)
BILIRUB SERPL-MCNC: 0.4 MG/DL (ref 0.1–1.5)
BUN SERPL-MCNC: 24 MG/DL (ref 8–22)
CALCIUM SERPL-MCNC: 9.4 MG/DL (ref 8.5–10.5)
CHLORIDE SERPL-SCNC: 105 MMOL/L (ref 96–112)
CO2 SERPL-SCNC: 31 MMOL/L (ref 20–33)
CREAT SERPL-MCNC: 1.14 MG/DL (ref 0.5–1.4)
EST. AVERAGE GLUCOSE BLD GHB EST-MCNC: 126 MG/DL
GGT SERPL-CCNC: 10 U/L (ref 7–34)
GLOBULIN SER CALC-MCNC: 2.4 G/DL (ref 1.9–3.5)
GLUCOSE SERPL-MCNC: 95 MG/DL (ref 65–99)
HBA1C MFR BLD: 6 % (ref 0–5.6)
MAGNESIUM SERPL-MCNC: 2 MG/DL (ref 1.5–2.5)
POTASSIUM SERPL-SCNC: 4.2 MMOL/L (ref 3.6–5.5)
PROT SERPL-MCNC: 6.8 G/DL (ref 6–8.2)
SODIUM SERPL-SCNC: 142 MMOL/L (ref 135–145)

## 2018-02-08 RX ORDER — LACTULOSE 10 G/15ML
20 SOLUTION ORAL 2 TIMES DAILY
Qty: 1 BOTTLE | Refills: 3 | Status: SHIPPED | OUTPATIENT
Start: 2018-02-08 | End: 2018-04-27

## 2018-02-08 NOTE — TELEPHONE ENCOUNTER
Was the patient seen in the last year in this department? Yes     Does patient have an active prescription for medications requested? Yes     Received Request Via: Pharmacy     Last visit 2/6/2018  Last labs 2/7/2018

## 2018-02-09 ENCOUNTER — HOME CARE VISIT (OUTPATIENT)
Dept: HOME HEALTH SERVICES | Facility: HOME HEALTHCARE | Age: 58
End: 2018-02-09
Payer: MEDICARE

## 2018-02-09 LAB — TACROLIMUS BLD-MCNC: 2.4 NG/ML

## 2018-02-09 PROCEDURE — G0495 RN CARE TRAIN/EDU IN HH: HCPCS

## 2018-02-12 ENCOUNTER — HOME CARE VISIT (OUTPATIENT)
Dept: HOME HEALTH SERVICES | Facility: HOME HEALTHCARE | Age: 58
End: 2018-02-12
Payer: MEDICARE

## 2018-02-12 VITALS
OXYGEN SATURATION: 99 % | SYSTOLIC BLOOD PRESSURE: 122 MMHG | HEART RATE: 77 BPM | DIASTOLIC BLOOD PRESSURE: 82 MMHG | TEMPERATURE: 98.5 F | RESPIRATION RATE: 18 BRPM

## 2018-02-12 VITALS
RESPIRATION RATE: 18 BRPM | OXYGEN SATURATION: 99 % | HEART RATE: 76 BPM | SYSTOLIC BLOOD PRESSURE: 124 MMHG | DIASTOLIC BLOOD PRESSURE: 58 MMHG | TEMPERATURE: 97.8 F

## 2018-02-12 PROCEDURE — G0299 HHS/HOSPICE OF RN EA 15 MIN: HCPCS

## 2018-02-12 PROCEDURE — G0180 MD CERTIFICATION HHA PATIENT: HCPCS | Performed by: FAMILY MEDICINE

## 2018-02-12 SDOH — ECONOMIC STABILITY: HOUSING INSECURITY: UNSAFE COOKING RANGE AREA: 0

## 2018-02-12 SDOH — ECONOMIC STABILITY: HOUSING INSECURITY: UNSAFE APPLIANCES: 0

## 2018-02-12 ASSESSMENT — ENCOUNTER SYMPTOMS
RESPIRATORY SYMPTOMS COMMENTS: PT ON CONTINUOUS OXYGEN
VOMITING: DENIES
NAUSEA: DENIES

## 2018-02-15 ENCOUNTER — HOME CARE VISIT (OUTPATIENT)
Dept: HOME HEALTH SERVICES | Facility: HOME HEALTHCARE | Age: 58
End: 2018-02-15
Payer: MEDICARE

## 2018-02-15 VITALS
DIASTOLIC BLOOD PRESSURE: 64 MMHG | SYSTOLIC BLOOD PRESSURE: 132 MMHG | TEMPERATURE: 97.7 F | RESPIRATION RATE: 18 BRPM | OXYGEN SATURATION: 98 % | HEART RATE: 78 BPM

## 2018-02-15 PROCEDURE — G0299 HHS/HOSPICE OF RN EA 15 MIN: HCPCS

## 2018-02-15 SDOH — ECONOMIC STABILITY: HOUSING INSECURITY: UNSAFE APPLIANCES: 0

## 2018-02-15 SDOH — ECONOMIC STABILITY: HOUSING INSECURITY: UNSAFE COOKING RANGE AREA: 0

## 2018-02-15 ASSESSMENT — ENCOUNTER SYMPTOMS
NAUSEA: DENIES
VOMITING: DENIES

## 2018-02-16 DIAGNOSIS — K21.00 GASTROESOPHAGEAL REFLUX DISEASE WITH ESOPHAGITIS: ICD-10-CM

## 2018-02-16 RX ORDER — OMEPRAZOLE 40 MG/1
40 CAPSULE, DELAYED RELEASE ORAL DAILY
Qty: 90 CAP | Refills: 3 | OUTPATIENT
Start: 2018-02-16

## 2018-02-23 ENCOUNTER — HOME CARE VISIT (OUTPATIENT)
Dept: HOME HEALTH SERVICES | Facility: HOME HEALTHCARE | Age: 58
End: 2018-02-23
Payer: MEDICARE

## 2018-02-23 VITALS
HEART RATE: 72 BPM | RESPIRATION RATE: 18 BRPM | SYSTOLIC BLOOD PRESSURE: 142 MMHG | OXYGEN SATURATION: 99 % | TEMPERATURE: 98 F | DIASTOLIC BLOOD PRESSURE: 80 MMHG

## 2018-02-23 PROCEDURE — G0299 HHS/HOSPICE OF RN EA 15 MIN: HCPCS

## 2018-02-23 RX ORDER — OMEPRAZOLE 40 MG/1
40 CAPSULE, DELAYED RELEASE ORAL DAILY
Qty: 90 CAP | Refills: 0 | Status: SHIPPED | OUTPATIENT
Start: 2018-02-23 | End: 2019-01-30 | Stop reason: SDUPTHER

## 2018-02-23 NOTE — TELEPHONE ENCOUNTER
Patients chart reviewed, she has been on these medications as of our last visit.  Rx refilled x 2.

## 2018-02-24 SDOH — ECONOMIC STABILITY: HOUSING INSECURITY: UNSAFE APPLIANCES: 0

## 2018-02-24 SDOH — ECONOMIC STABILITY: HOUSING INSECURITY: UNSAFE COOKING RANGE AREA: 0

## 2018-02-24 ASSESSMENT — ENCOUNTER SYMPTOMS
VOMITING: DENIES
RESPIRATORY SYMPTOMS COMMENTS: DENIES SOB
NAUSEA: DENIES

## 2018-02-27 ENCOUNTER — OFFICE VISIT (OUTPATIENT)
Dept: MEDICAL GROUP | Facility: MEDICAL CENTER | Age: 58
End: 2018-02-27
Payer: MEDICARE

## 2018-02-27 VITALS
HEIGHT: 68 IN | WEIGHT: 172 LBS | BODY MASS INDEX: 26.07 KG/M2 | RESPIRATION RATE: 16 BRPM | TEMPERATURE: 97.2 F | HEART RATE: 68 BPM | OXYGEN SATURATION: 94 % | SYSTOLIC BLOOD PRESSURE: 120 MMHG | DIASTOLIC BLOOD PRESSURE: 72 MMHG

## 2018-02-27 DIAGNOSIS — Z79.891 CHRONIC USE OF OPIATE DRUGS THERAPEUTIC PURPOSES: ICD-10-CM

## 2018-02-27 DIAGNOSIS — N18.30 CKD (CHRONIC KIDNEY DISEASE) STAGE 3, GFR 30-59 ML/MIN (HCC): ICD-10-CM

## 2018-02-27 DIAGNOSIS — I25.2 H/O NON-ST ELEVATION MYOCARDIAL INFARCTION (NSTEMI): ICD-10-CM

## 2018-02-27 DIAGNOSIS — D84.9 IMMUNOCOMPROMISED STATE (HCC): ICD-10-CM

## 2018-02-27 DIAGNOSIS — R73.9 STEROID-INDUCED HYPERGLYCEMIA: ICD-10-CM

## 2018-02-27 DIAGNOSIS — T38.0X5A STEROID-INDUCED HYPERGLYCEMIA: ICD-10-CM

## 2018-02-27 DIAGNOSIS — K59.03 DRUG-INDUCED CONSTIPATION: ICD-10-CM

## 2018-02-27 DIAGNOSIS — Z87.01 HISTORY OF ASPIRATION PNEUMONIA: ICD-10-CM

## 2018-02-27 DIAGNOSIS — T40.2X5A CONSTIPATION DUE TO OPIOID THERAPY: ICD-10-CM

## 2018-02-27 DIAGNOSIS — K44.9 HIATAL HERNIA: ICD-10-CM

## 2018-02-27 DIAGNOSIS — K59.03 CONSTIPATION DUE TO OPIOID THERAPY: ICD-10-CM

## 2018-02-27 DIAGNOSIS — D86.9 SARCOIDOSIS: ICD-10-CM

## 2018-02-27 DIAGNOSIS — J96.11 CHRONIC RESPIRATORY FAILURE WITH HYPOXIA (HCC): ICD-10-CM

## 2018-02-27 DIAGNOSIS — I27.20 PULMONARY HYPERTENSION (HCC): ICD-10-CM

## 2018-02-27 DIAGNOSIS — D47.2 MGUS (MONOCLONAL GAMMOPATHY OF UNKNOWN SIGNIFICANCE): ICD-10-CM

## 2018-02-27 DIAGNOSIS — Z94.4 STATUS POST LIVER TRANSPLANT (HCC): ICD-10-CM

## 2018-02-27 DIAGNOSIS — R16.1 SPLENOMEGALY: ICD-10-CM

## 2018-02-27 DIAGNOSIS — F41.1 GAD (GENERALIZED ANXIETY DISORDER): ICD-10-CM

## 2018-02-27 DIAGNOSIS — Z79.899 CHRONIC PRESCRIPTION BENZODIAZEPINE USE: ICD-10-CM

## 2018-02-27 DIAGNOSIS — K76.81 HEPATOPULMONARY SYNDROME (HCC): ICD-10-CM

## 2018-02-27 PROBLEM — D48.62 NEOPLASM OF UNCERTAIN BEHAVIOR OF LEFT BREAST: Status: RESOLVED | Noted: 2017-02-21 | Resolved: 2018-02-27

## 2018-02-27 PROCEDURE — 99215 OFFICE O/P EST HI 40 MIN: CPT | Performed by: FAMILY MEDICINE

## 2018-02-27 RX ORDER — ALPRAZOLAM 1 MG/1
1 TABLET ORAL 3 TIMES DAILY
Qty: 90 TAB | Refills: 2 | Status: SHIPPED | OUTPATIENT
Start: 2018-03-01 | End: 2018-05-29 | Stop reason: SDUPTHER

## 2018-02-28 ENCOUNTER — OFFICE VISIT (OUTPATIENT)
Dept: CARDIOLOGY | Facility: MEDICAL CENTER | Age: 58
End: 2018-02-28
Payer: MEDICARE

## 2018-02-28 VITALS
HEART RATE: 78 BPM | BODY MASS INDEX: 27.15 KG/M2 | HEIGHT: 67 IN | WEIGHT: 173 LBS | OXYGEN SATURATION: 93 % | SYSTOLIC BLOOD PRESSURE: 130 MMHG | DIASTOLIC BLOOD PRESSURE: 78 MMHG

## 2018-02-28 DIAGNOSIS — K59.03 DRUG-INDUCED CONSTIPATION: ICD-10-CM

## 2018-02-28 DIAGNOSIS — J44.9 CHRONIC OBSTRUCTIVE PULMONARY DISEASE, UNSPECIFIED COPD TYPE (HCC): ICD-10-CM

## 2018-02-28 DIAGNOSIS — F41.1 GAD (GENERALIZED ANXIETY DISORDER): ICD-10-CM

## 2018-02-28 DIAGNOSIS — Z79.891 CHRONIC USE OF OPIATE DRUGS THERAPEUTIC PURPOSES: ICD-10-CM

## 2018-02-28 DIAGNOSIS — E78.00 PURE HYPERCHOLESTEROLEMIA: ICD-10-CM

## 2018-02-28 DIAGNOSIS — J69.0 ASPIRATION PNEUMONIA OF BOTH LOWER LOBES DUE TO GASTRIC SECRETIONS (HCC): ICD-10-CM

## 2018-02-28 DIAGNOSIS — D86.9 SARCOIDOSIS: ICD-10-CM

## 2018-02-28 DIAGNOSIS — I25.2 H/O NON-ST ELEVATION MYOCARDIAL INFARCTION (NSTEMI): ICD-10-CM

## 2018-02-28 DIAGNOSIS — I35.1 MILD AORTIC REGURGITATION: ICD-10-CM

## 2018-02-28 DIAGNOSIS — R16.1 SPLENOMEGALY: ICD-10-CM

## 2018-02-28 DIAGNOSIS — K76.81 HEPATOPULMONARY SYNDROME (HCC): ICD-10-CM

## 2018-02-28 DIAGNOSIS — K21.9 GASTROESOPHAGEAL REFLUX DISEASE, ESOPHAGITIS PRESENCE NOT SPECIFIED: ICD-10-CM

## 2018-02-28 DIAGNOSIS — E27.1 ADDISON'S DISEASE (HCC): ICD-10-CM

## 2018-02-28 DIAGNOSIS — I27.20 PULMONARY HYPERTENSION (HCC): ICD-10-CM

## 2018-02-28 DIAGNOSIS — Z94.4 STATUS POST LIVER TRANSPLANT (HCC): ICD-10-CM

## 2018-02-28 DIAGNOSIS — G89.29 CHRONIC GENERALIZED ABDOMINAL PAIN: ICD-10-CM

## 2018-02-28 DIAGNOSIS — K56.609 SBO (SMALL BOWEL OBSTRUCTION) (HCC): ICD-10-CM

## 2018-02-28 DIAGNOSIS — N18.30 CKD (CHRONIC KIDNEY DISEASE) STAGE 3, GFR 30-59 ML/MIN (HCC): ICD-10-CM

## 2018-02-28 DIAGNOSIS — D47.2 MGUS (MONOCLONAL GAMMOPATHY OF UNKNOWN SIGNIFICANCE): ICD-10-CM

## 2018-02-28 DIAGNOSIS — R10.84 CHRONIC GENERALIZED ABDOMINAL PAIN: ICD-10-CM

## 2018-02-28 DIAGNOSIS — G89.4 CHRONIC PAIN SYNDROME: ICD-10-CM

## 2018-02-28 DIAGNOSIS — I34.0 MILD MITRAL REGURGITATION: ICD-10-CM

## 2018-02-28 DIAGNOSIS — R00.1 BRADYCARDIA: ICD-10-CM

## 2018-02-28 DIAGNOSIS — D69.6 THROMBOCYTOPENIA (HCC): ICD-10-CM

## 2018-02-28 PROCEDURE — 99214 OFFICE O/P EST MOD 30 MIN: CPT | Performed by: INTERNAL MEDICINE

## 2018-02-28 RX ORDER — PREDNISONE 1 MG/1
TABLET ORAL
Qty: 90 TAB | Refills: 5 | Status: SHIPPED | OUTPATIENT
Start: 2018-02-28 | End: 2018-04-27

## 2018-02-28 ASSESSMENT — ENCOUNTER SYMPTOMS
SORE THROAT: 0
BRUISES/BLEEDS EASILY: 0
NEUROLOGICAL NEGATIVE: 1
CLAUDICATION: 0
SPUTUM PRODUCTION: 0
SHORTNESS OF BREATH: 1
DIZZINESS: 0
WEAKNESS: 0
WHEEZING: 0
GASTROINTESTINAL NEGATIVE: 1
STRIDOR: 0
FEVER: 0
COUGH: 1
EYES NEGATIVE: 1
HEMOPTYSIS: 0
PND: 0
CHILLS: 0
NERVOUS/ANXIOUS: 1
LOSS OF CONSCIOUSNESS: 0
ORTHOPNEA: 0
MUSCULOSKELETAL NEGATIVE: 1
PALPITATIONS: 0

## 2018-02-28 NOTE — LETTER
"     [facility]   [address], [address]  [place] [address]  Phone: [phone]  Fax: [phone]              Roxana Cuba  1960    Encounter Date: 2/28/2018    Maxwell Phan M.D.          PROGRESS NOTE:  Subjective:   Roxana Cuba is a 58 y.o. female who presents today     Chief Complaint: ***    Past Medical History:   Diagnosis Date   • Breath shortness    • Bronchitis      2016   • Cardiomegaly    • Chronic fatigue and malaise    • Cirrhosis (CMS-HCC) December 2011    [place] [facility] [place]   • CKD (chronic kidney disease) stage 3, GFR 30-59 ml/min    • Diabetes (CMS-[facility])     reports hx of, resolved w/weight loss. Reports still checking bloodsugars twice daily and using insulin PRN   • Fracture of left foot    • GERD (gastroesophageal reflux disease)         • H/O Clostridium difficile infection 02-17-17    reports \"16 months ago\". Current stool sample 01-26-17 neg   • Hypothyroid    • Low back pain 02-17-17    and left foot, 7/10   • Mild aortic regurgitation and aortic valve sclerosis     • On home oxygen therapy     4 liters, Dr. Gaming   • Platelet disorder (CMS-[facility])     low platelets   • Pneumonia     aspiration,    • Psychiatric disorder     Mood disorder   • Pulmonary hypertension        • S/P cholecystectomy    • Small bowel obstruction     01-   • Splenomegaly    • VRE (vancomycin-resistant Enterococci)     02-17-17, pt declines knowledge of this     Past Surgical History:   Procedure Laterality Date   • OTHER  12/11/2017    paniculectomy   • COLONOSCOPY N/A 3/27/2017    Procedure: COLONOSCOPY;  Surgeon: Osman Matt M.D.;  Location: SURGERY SAME DAY [facility] ORS;  Service:    • GASTROSCOPY N/A 3/27/2017    Procedure: GASTROSCOPY;  Surgeon: Osman Matt M.D.;  Location: SURGERY SAME DAY [facility] ORS;  Service:    • BREAST BIOPSY Left 2/21/2017    Procedure: BREAST BIOPSY - WIRE LOCALIZED EXCISONAL ;  " Surgeon: Jane Zhao M.D.;  Location: SURGERY SAME DAY [facility];  Service:    • LUNG BIOPSY OPEN  11/2016   • OTHER ABDOMINAL SURGERY      Gasric Sleeve   • BONE MARROW ASPIRATION  3/16/2015    Performed by Marlena Hi M.D. at ENDOSCOPY [facility]   • BONE MARROW BIOPSY, NDL/TROCAR  3/16/2015    Performed by Marlena Hi M.D. at ENDOSCOPY [facility]   • RECOVERY  3/31/2014    Performed by [facility] Surgery at SURGERY [facility]   • RECOVERY  3/24/2014    Performed by Cath-Recovery Surgery at SURGERY SAME DAY [facility]   • RECOVERY  1/21/2014    Performed by [facility] at SURGERY SAME DAY [facility]   • BRONCHOSCOPY-ENDO  11/15/2013    Performed by Ha Perez M.D. at ENDOSCOPY [facility]   • RECOVERY  2/27/2013    Performed by [facility]Recovery Surgery at SURGERY SAME DAY [facility]   • BONE MARROW ASPIRATION  12/31/2012    Performed by Josemanuel Real M.D. at ENDOSCOPY [facility]   • BONE MARROW BIOPSY, NDL/TROCAR  12/31/2012    Performed by Josemanuel Real M.D. at ENDOSCOPY [facility]   • GASTROSCOPY  9/28/2012    Performed by Aravind Richards M.D. at SURGERY [facility]   • SIGMOIDOSCOPY FLEX  9/26/2012    Performed by Kristopher Cardoso M.D. at [facility]   • BRONCHOSCOPY-ENDO  6/19/2012    Performed by MARLENA MURILLO at ENDOSCOPY [facility]   • BRONCHOSCOPY-ENDO  5/29/2012    Performed by SUYAPA CAMARENA at ENDOSCOPY [facility]   • OTHER ABDOMINAL SURGERY  December 2011    Liver transplant at [facility] by Dr. Canada.   • GASTROSCOPY-ENDO  3/12/2010    Performed by CAMELIA SAMANO at [facility]   • EXAM UNDER ANESTHESIA  11/11/2009    Performed by BIRD ABDI at SURGERY [facility]   • HEMORRHOIDECTOMY  11/11/2009    Performed by BIRD ABDI at SURGERY [facility]   • KELBY BY LAPAROSCOPY  9/19/2009    Performed by BIRD ABDI at [facility]   • CARPAL TUNNEL  RELEASE          • HYSTERECTOMY, TOTAL ABDOMINAL          • OTHER      Breast reduction   • PB ANESTH,NOSE,SINUS SURGERY     • TONSILLECTOMY       Family History   Problem Relation Age of Onset   • Other Father      Unknown (dead 10 years)   • Diabetes Father    • Heart Disease Father    • Hypertension Father    • Hyperlipidemia Father    • Stroke Father    • Non-contributory Mother    • Hyperlipidemia Mother    • Alcohol/Drug Mother    • Cancer Paternal Aunt    • Alcohol/Drug Maternal Grandmother    • Alcohol/Drug Maternal Grandfather      History   Smoking Status   • Never Smoker   Smokeless Tobacco   • Never Used     Comment: avoid all tobacco products     Allergies   Allergen Reactions   • Rhubarb Anaphylaxis   • Organic Nitrates      Nitroglycerin products should be avoided with the use of PDE-5 inhibitors as the combination can result in severe hypotension.   • Vancomycin Hcl      Causes red man syndrome when infused to fast       Outpatient Encounter Prescriptions as of 2/28/2018   Medication Sig Dispense Refill   • predniSONE (DELTASONE) 1 MG Tab TAKE 1 TO 4 TABS BY MOUTH DAILY AS DIRECTED. 90 Tab 5   • methylnaltrexone (RELISTOR) 12 MG/0.6ML Solution Inject 0.6 mL as instructed 1 time daily as needed for up to 30 days. 30 Syringe 2   • [START ON 3/1/2018] ALPRAZolam (XANAX) 1 MG Tab Take 1 Tab by mouth 3 times a day for 90 days. 90 Tab 2   • omeprazole (PRILOSEC) 40 MG delayed-release capsule Take 1 Cap by mouth every day. 90 Cap 0   • aspirin 81 MG tablet Take 1 Tab by mouth every day. 90 Tab 0   • lactulose 10 GM/15ML Solution Take 30 mL by mouth 2 times a day. 1 Bottle 3   • gabapentin (NEURONTIN) 600 MG tablet Take 1 Tab by mouth 3 times a day. 90 Tab 0   • FREESTYLE LITE strip USE TO TEST EVERY DAY AS NEEDED SSX HIGH OR LOW  2   • docusate sodium (COLACE) 100 MG Cap Take 100 mg by mouth 2 times a day as needed for Constipation.     • sennosides (SENOKOT) 8.6 MG Tab Take 8.6 mg by mouth 1 time daily as  needed (constipation).     • Carboxymethylcell-Hypromellose (GENTEAL) 0.25-0.3 % Gel Place 0.3 mg in both eyes every day at 6 PM.     • Wheat Dextrin (BENEFIBER DRINK MIX PO) Take 7.4 g by mouth every day at 6 PM.     • non-formulary med Inhale 5 L/min by mouth Continuous. oxygen x nasal cannula     • predniSONE (DELTASONE) 5 MG Tab Take 5 mg by mouth every day. Total dose = 7 mg     • furosemide (LASIX) 40 MG Tab TAKE 1 TAB BY MOUTH EVERY DAY. 30 Tab 6   • oxycodone immediate release (ROXICODONE) 10 MG immediate release tablet Take 1 Tab by mouth 3 times a day as needed for Severe Pain for up to 30 days. 90 Tab 0   • Linaclotide (LINZESS) 290 MCG Cap Take 1 Cap by mouth every day. 90 Cap 3   • bisacodyl (DULCOLAX) 10 MG Suppos Insert 1 Suppository in rectum 1 time daily as needed (constipation). 30 Suppository 3   • trazodone (DESYREL) 50 MG Tab TAKE 1-2 TABS BY MOUTH EVERY BEDTIME. 180 Tab 3   • sertraline (ZOLOFT) 100 MG Tab Take 1 Tab by mouth every bedtime. 90 Tab 4   • fluticasone (FLONASE) 50 MCG/ACT nasal spray SPRAY 1 SPRAY IN EACH NOSTRIL TWICE A DAY 48 g 3   • levothyroxine (SYNTHROID) 137 MCG Tab Take 1 Tab by mouth Every morning on an empty stomach. 90 Tab 3   • ACIDOPHILUS LACTOBACILLUS PO Take 1 Capsule by mouth every day.     • tacrolimus (PROGRAF) 1 MG Cap TAKE 1 CAPSULE BY MOUTH EVERY MORNING AND 1 CAP IN THE EVENING-TAKE WITH 0.5MG  11   • tacrolimus (PROGRAF) 0.5 MG Cap Take 0.5 mg by mouth 2 Times a Day. Takes with 1 mg tacrolimus BID     • ambrisentan (LETAIRIS) 10 MG tablet Take 1 Tab by mouth every day. 30 Tab 11   • pravastatin (PRAVACHOL) 20 MG Tab TAKE 1 TAB BY MOUTH EVERY DAY. 30 Tab 11   • cyclobenzaprine (FLEXERIL) 10 MG Tab Take 10 mg by mouth 3 times a day as needed for Muscle Spasms.     • ferrous sulfate (FEOSOL) 220 (44 FE) MG/5ML Elixir Take 5 mL by mouth every day. 1 Bottle 10   • CITRACAL MAXIMUM 315-250 MG-UNIT Tab TAKE 2 TABS BY MOUTH 2X/ DAY WITH FOOD BEFORE AND AFTER DINNER  FOR LIFE-CRUSH 1ST 3 MONTH, SPACE  Tab 11   • ascorbic acid (ASCORBIC ACID) 500 MG Tab Take 500 mg by mouth every day.     • ondansetron (ZOFRAN ODT) 4 MG TABLET DISPERSIBLE Take 1 Tab by mouth every 8 hours as needed for Nausea/Vomiting. Nausea and vomiting 270 Tab 4   • tiotropium (SPIRIVA) 18 MCG Cap Inhale 1 Cap by mouth every day. 90 Cap 4   • [DISCONTINUED] predniSONE (DELTASONE) 1 MG Tab Take 1 mg by mouth every day.     • polyethylene glycol 3350 (MIRALAX) Powder Take 17 g by mouth every day.     • NYSTOP (MYCOSTATIN) 258427 UNIT/GM Powder Apply 1 g to affected area(s) 3 times a day as needed (skin irritation).     • [DISCONTINUED] predniSONE (DELTASONE) 1 MG Tab Take 2 mg by mouth every day. Total dose = 7 mg     • morphine (MS IR) 15 MG tablet Take 1 Tab by mouth every four hours as needed for Severe Pain. 30 Tab 0   • NON SPECIFIED Take 1 Cap by mouth every evening. Bariatric Dietary Supplment      • Tadalafil, PAH, (ADCIRCA) 20 MG Tab Take 40 mg by mouth every bedtime. Indications: Pulmonary Arterial Hypertension 180 Tab 3   • mycophenolate (CELLCEPT) 250 MG Cap Take 500 mg by mouth 2 times a day.       No facility-administered encounter medications on file as of 2/28/2018.      Review of Systems   Constitutional: Negative.  Negative for chills, fever and malaise/fatigue.   HENT: Negative.  Negative for sore throat.    Eyes: Negative.    Respiratory: Negative.  Negative for cough, hemoptysis, sputum production, shortness of breath, wheezing and stridor.    Cardiovascular: Negative.  Negative for chest pain, palpitations, orthopnea, claudication, leg swelling and PND.   Gastrointestinal: Negative.    Genitourinary: Negative.    Musculoskeletal: Negative.    Skin: Negative.    Neurological: Negative.  Negative for dizziness, loss of consciousness and weakness.   Endo/Heme/Allergies: Negative.  Does not bruise/bleed easily.   All other systems reviewed and are negative.       Objective:   /78   " Pulse 78   Ht 1.702 m (5' 7\")   Wt 78.5 kg (173 lb)   LMP 01/03/2000   SpO2 93%   BMI 27.10 kg/m²      Physical Exam    Assessment:     1. Beebe's disease (CMS-HCC)     2. Aspiration pneumonia of both lower lobes due to gastric secretions (CMS-HCC)     3. Bradycardia     4. Chronic generalized abdominal pain     5. Chronic use of opiate drugs therapeutic purposes     6. CKD (chronic kidney disease) stage 3, GFR 30-59 ml/min- unclear cause, probably multifactorial     7. Drug-induced constipation     8. DUC (generalized anxiety disorder)     9. Gastroesophageal reflux disease, esophagitis presence not specified     10. H/O non-ST elevation myocardial infarction (NSTEMI)     11. Hepatopulmonary syndrome (CMS-HCC)     12. MGUS (monoclonal gammopathy of unknown significance)     13. Mild aortic regurgitation and aortic valve sclerosis     14. Mild mitral regurgitation     15. Pulmonary hypertension     16. Pure hypercholesterolemia     17. Sarcoidosis (CMS-HCC)     18. SBO (small bowel obstruction)     19. Splenomegaly     20. Status post liver transplant Dr. Canada ([facility])     21. Thrombocytopenia (CMS-HCC)         Medical Decision Making:  Today's Assessment / Status / Plan:         Elisa Phillip M.D.  [address]  [address]  [facility] [address]  VIA In Basket                 "

## 2018-02-28 NOTE — ASSESSMENT & PLAN NOTE
Patient has chronic anxiety.  She has been on alprazolam 1 mg TID for 10 years. She reports this allows her to function. She often feels overwhelmed by her chronic medical conditions. She has tried multiple other medications that this has been what is most effective.

## 2018-02-28 NOTE — PROGRESS NOTES
"Subjective:   Roxana Cuba is a 58 y.o. female who presents today as a follow-up for her pulmonary hypertension cirrhosis chronic kidney disease and liver transplant. Since she was last seen she was evaluated using [place] for the issues with her lung. She was eventually diagnosed with chronic aspiration. They're planning on doing a redo abdominal surgery to fix her aspiration. She is extraordinarily nervous about this. She continues on 4 L of home oxygen. She is having no lower extremity edema. Blood pressures controlled. Creatinine is stable.      Past Medical History:   Diagnosis Date   • Breath shortness    • Bronchitis      2016   • Cardiomegaly    • Chronic fatigue and malaise    • Cirrhosis (CMS-[facility]) December 2011    Status post liver transplant at [facility] [place]   • CKD (chronic kidney disease) stage 3, GFR 30-59 ml/min    • Diabetes (CMS-HCC)     reports hx of, resolved w/weight loss. Reports still checking bloodsugars twice daily and using insulin PRN   • Fracture of left foot    • GERD (gastroesophageal reflux disease)         • H/O Clostridium difficile infection 02-17-17    reports \"16 months ago\". Current stool sample 01-26-17 neg   • Hypothyroid    • Low back pain 02-17-17    and left foot, 7/10   • Mild aortic regurgitation and aortic valve sclerosis     • On home oxygen therapy     4 liters, Dr. Gaming   • Platelet disorder (CMS-HCC)     low platelets   • Pneumonia     aspiration,    • Psychiatric disorder     Mood disorder   • Pulmonary hypertension        • S/P cholecystectomy    • Small bowel obstruction     01-   • Splenomegaly    • VRE (vancomycin-resistant Enterococci)     02-17-17, pt declines knowledge of this     Past Surgical History:   Procedure Laterality Date   • OTHER  12/11/2017    paniculectomy   • COLONOSCOPY N/A 3/27/2017    Procedure: COLONOSCOPY;  Surgeon: Osman Matt M.D.;  Location: SURGERY SAME DAY [facility];  Service:    • GASTROSCOPY N/A 3/27/2017    " Procedure: GASTROSCOPY;  Surgeon: Osman Matt M.D.;  Location: SURGERY SAME DAY [facility];  Service:    • BREAST BIOPSY Left 2/21/2017    Procedure: BREAST BIOPSY - WIRE LOCALIZED EXCISONAL ;  Surgeon: Jane Zhao M.D.;  Location: SURGERY SAME DAY [facility];  Service:    • LUNG BIOPSY OPEN  11/2016   • OTHER ABDOMINAL SURGERY      Gasric Sleeve   • BONE MARROW ASPIRATION  3/16/2015    Performed by Marlena Hi M.D. at ENDOSCOPY [facility]   • BONE MARROW BIOPSY, NDL/TROCAR  3/16/2015    Performed by Marlena Hi M.D. at ENDOSCOPY [facility]   • RECOVERY  3/31/2014    Performed by [facility] at SURGERY [facility]   • RECOVERY  3/24/2014    Performed by [facility]Recovery Surgery at SURGERY SAME DAY [facility]   • RECOVERY  1/21/2014    Performed by [facility] at SURGERY SAME DAY [facility]   • BRONCHOSCOPY-ENDO  11/15/2013    Performed by Ha Perez M.D. at ENDOSCOPY [facility]   • RECOVERY  2/27/2013    Performed by [facility] at SURGERY SAME DAY [facility]   • BONE MARROW ASPIRATION  12/31/2012    Performed by Josemanuel Real M.D. at ENDOSCOPY [facility]   • BONE MARROW BIOPSY, NDL/TROCAR  12/31/2012    Performed by Josemanuel Real M.D. at [facility]   • GASTROSCOPY  9/28/2012    Performed by Aravind Richards M.D. at SURGERY [facility]   • SIGMOIDOSCOPY FLEX  9/26/2012    Performed by Kristopher Cardoso M.D. at [facility]   • BRONCHOSCOPY-ENDO  6/19/2012    Performed by MARLENA MURILLO at [facility]   • BRONCHOSCOPY-ENDO  5/29/2012    Performed by SUYAPA CAMARENA at [facility]   • OTHER ABDOMINAL SURGERY  December 2011    [place] [facility] [place]   • GASTROSCOPY-ENDO  3/12/2010    Performed by CAMELIA SAMANO at [facility]   • EXAM UNDER ANESTHESIA  11/11/2009    Performed by BIRD ABDI at SURGERY [facility]   •  HEMORRHOIDECTOMY  11/11/2009    Performed by BIRD ABDI at SURGERY [facility]   • KELBY BY LAPAROSCOPY  9/19/2009    Performed by BIRD ABDI at SURGERY [facility]   • CARPAL TUNNEL RELEASE          • HYSTERECTOMY, TOTAL ABDOMINAL          • OTHER      Breast reduction   • PB ANESTH,NOSE,SINUS SURGERY     • TONSILLECTOMY       Family History   Problem Relation Age of Onset   • Other Father      Unknown (dead 10 years)   • Diabetes Father    • Heart Disease Father    • Hypertension Father    • Hyperlipidemia Father    • Stroke Father    • Non-contributory Mother    • Hyperlipidemia Mother    • Alcohol/Drug Mother    • Cancer Paternal Aunt    • Alcohol/Drug Maternal Grandmother    • Alcohol/Drug Maternal Grandfather      History   Smoking Status   • Never Smoker   Smokeless Tobacco   • Never Used     Comment: avoid all tobacco products     Allergies   Allergen Reactions   • Rhubarb Anaphylaxis   • Organic Nitrates      Nitroglycerin products should be avoided with the use of PDE-5 inhibitors as the combination can result in severe hypotension.   • Vancomycin Hcl      Causes red man syndrome when infused to fast       Outpatient Encounter Prescriptions as of 2/28/2018   Medication Sig Dispense Refill   • predniSONE (DELTASONE) 1 MG Tab TAKE 1 TO 4 TABS BY MOUTH DAILY AS DIRECTED. 90 Tab 5   • methylnaltrexone (RELISTOR) 12 MG/0.6ML Solution Inject 0.6 mL as instructed 1 time daily as needed for up to 30 days. 30 Syringe 2   • [START ON 3/1/2018] ALPRAZolam (XANAX) 1 MG Tab Take 1 Tab by mouth 3 times a day for 90 days. 90 Tab 2   • omeprazole (PRILOSEC) 40 MG delayed-release capsule Take 1 Cap by mouth every day. 90 Cap 0   • aspirin 81 MG tablet Take 1 Tab by mouth every day. 90 Tab 0   • lactulose 10 GM/15ML Solution Take 30 mL by mouth 2 times a day. 1 Bottle 3   • gabapentin (NEURONTIN) 600 MG tablet Take 1 Tab by mouth 3 times a day. 90 Tab 0   • FREESTYLE LITE strip USE TO TEST EVERY DAY AS NEEDED  SSX HIGH OR LOW  2   • docusate sodium (COLACE) 100 MG Cap Take 100 mg by mouth 2 times a day as needed for Constipation.     • sennosides (SENOKOT) 8.6 MG Tab Take 8.6 mg by mouth 1 time daily as needed (constipation).     • Carboxymethylcell-Hypromellose (GENTEAL) 0.25-0.3 % Gel Place 0.3 mg in both eyes every day at 6 PM.     • Wheat Dextrin (BENEFIBER DRINK MIX PO) Take 7.4 g by mouth every day at 6 PM.     • non-formulary med Inhale 5 L/min by mouth Continuous. oxygen x nasal cannula     • predniSONE (DELTASONE) 5 MG Tab Take 5 mg by mouth every day. Total dose = 7 mg     • furosemide (LASIX) 40 MG Tab TAKE 1 TAB BY MOUTH EVERY DAY. 30 Tab 6   • oxycodone immediate release (ROXICODONE) 10 MG immediate release tablet Take 1 Tab by mouth 3 times a day as needed for Severe Pain for up to 30 days. 90 Tab 0   • Linaclotide (LINZESS) 290 MCG Cap Take 1 Cap by mouth every day. 90 Cap 3   • bisacodyl (DULCOLAX) 10 MG Suppos Insert 1 Suppository in rectum 1 time daily as needed (constipation). 30 Suppository 3   • trazodone (DESYREL) 50 MG Tab TAKE 1-2 TABS BY MOUTH EVERY BEDTIME. 180 Tab 3   • sertraline (ZOLOFT) 100 MG Tab Take 1 Tab by mouth every bedtime. 90 Tab 4   • fluticasone (FLONASE) 50 MCG/ACT nasal spray SPRAY 1 SPRAY IN EACH NOSTRIL TWICE A DAY 48 g 3   • levothyroxine (SYNTHROID) 137 MCG Tab Take 1 Tab by mouth Every morning on an empty stomach. 90 Tab 3   • ACIDOPHILUS LACTOBACILLUS PO Take 1 Capsule by mouth every day.     • tacrolimus (PROGRAF) 1 MG Cap TAKE 1 CAPSULE BY MOUTH EVERY MORNING AND 1 CAP IN THE EVENING-TAKE WITH 0.5MG  11   • tacrolimus (PROGRAF) 0.5 MG Cap Take 0.5 mg by mouth 2 Times a Day. Takes with 1 mg tacrolimus BID     • ambrisentan (LETAIRIS) 10 MG tablet Take 1 Tab by mouth every day. 30 Tab 11   • pravastatin (PRAVACHOL) 20 MG Tab TAKE 1 TAB BY MOUTH EVERY DAY. 30 Tab 11   • cyclobenzaprine (FLEXERIL) 10 MG Tab Take 10 mg by mouth 3 times a day as needed for Muscle Spasms.     •  ferrous sulfate (FEOSOL) 220 (44 FE) MG/5ML Elixir Take 5 mL by mouth every day. 1 Bottle 10   • CITRACAL MAXIMUM 315-250 MG-UNIT Tab TAKE 2 TABS BY MOUTH 2X/ DAY WITH FOOD BEFORE AND AFTER DINNER FOR LIFE-CRUSH 1ST 3 MONTH, SPACE  Tab 11   • ascorbic acid (ASCORBIC ACID) 500 MG Tab Take 500 mg by mouth every day.     • ondansetron (ZOFRAN ODT) 4 MG TABLET DISPERSIBLE Take 1 Tab by mouth every 8 hours as needed for Nausea/Vomiting. Nausea and vomiting 270 Tab 4   • tiotropium (SPIRIVA) 18 MCG Cap Inhale 1 Cap by mouth every day. 90 Cap 4   • [DISCONTINUED] predniSONE (DELTASONE) 1 MG Tab Take 1 mg by mouth every day.     • polyethylene glycol 3350 (MIRALAX) Powder Take 17 g by mouth every day.     • NYSTOP (MYCOSTATIN) 577263 UNIT/GM Powder Apply 1 g to affected area(s) 3 times a day as needed (skin irritation).     • [DISCONTINUED] predniSONE (DELTASONE) 1 MG Tab Take 2 mg by mouth every day. Total dose = 7 mg     • morphine (MS IR) 15 MG tablet Take 1 Tab by mouth every four hours as needed for Severe Pain. 30 Tab 0   • NON SPECIFIED Take 1 Cap by mouth every evening. Bariatric Dietary Supplment      • Tadalafil, PAH, (ADCIRCA) 20 MG Tab Take 40 mg by mouth every bedtime. Indications: Pulmonary Arterial Hypertension 180 Tab 3   • mycophenolate (CELLCEPT) 250 MG Cap Take 500 mg by mouth 2 times a day.       No facility-administered encounter medications on file as of 2/28/2018.      Review of Systems   Constitutional: Positive for malaise/fatigue. Negative for chills and fever.   HENT: Negative.  Negative for sore throat.    Eyes: Negative.    Respiratory: Positive for cough and shortness of breath. Negative for hemoptysis, sputum production, wheezing and stridor.    Cardiovascular: Positive for leg swelling. Negative for chest pain, palpitations, orthopnea, claudication and PND.   Gastrointestinal: Negative.    Genitourinary: Negative.    Musculoskeletal: Negative.    Skin: Negative.    Neurological:  "Negative.  Negative for dizziness, loss of consciousness and weakness.   Endo/Heme/Allergies: Negative.  Does not bruise/bleed easily.   Psychiatric/Behavioral: The patient is nervous/anxious.    All other systems reviewed and are negative.       Objective:   /78   Pulse 78   Ht 1.702 m (5' 7\")   Wt 78.5 kg (173 lb)   LMP 01/03/2000   SpO2 93%   BMI 27.10 kg/m²     Physical Exam   Constitutional: She is oriented to person, place, and time. She appears well-developed and well-nourished. No distress.   HENT:   Head: Normocephalic.   Mouth/Throat: Oropharynx is clear and moist.   Eyes: EOM are normal. Pupils are equal, round, and reactive to light. Right eye exhibits no discharge. Left eye exhibits no discharge. No scleral icterus.   Neck: Normal range of motion. Neck supple. No JVD present. No tracheal deviation present.   Cardiovascular: Normal rate, regular rhythm, S1 normal, S2 normal, normal heart sounds, intact distal pulses and normal pulses.  Exam reveals no gallop, no S3, no S4 and no friction rub.    No murmur heard.   No systolic murmur is present    No diastolic murmur is present   Pulses:       Carotid pulses are 2+ on the right side, and 2+ on the left side.       Radial pulses are 2+ on the right side, and 2+ on the left side.        Dorsalis pedis pulses are 2+ on the right side, and 2+ on the left side.        Posterior tibial pulses are 2+ on the right side, and 2+ on the left side.   Pulmonary/Chest: Effort normal and breath sounds normal. No respiratory distress. She has no wheezes. She has no rales.   Abdominal: Soft. Bowel sounds are normal. She exhibits no distension and no mass. There is no tenderness. There is no rebound and no guarding.   Musculoskeletal: She exhibits no edema.   Neurological: She is alert and oriented to person, place, and time. No cranial nerve deficit.   Skin: Skin is warm and dry. She is not diaphoretic. No pallor.   Psychiatric: She has a normal mood and " affect. Her behavior is normal. Judgment and thought content normal.   Nursing note and vitals reviewed.      Assessment:     1. Blaine's disease (CMS-HCC)     2. Aspiration pneumonia of both lower lobes due to gastric secretions (CMS-HCC)     3. Bradycardia     4. Chronic generalized abdominal pain     5. Chronic use of opiate drugs therapeutic purposes     6. CKD (chronic kidney disease) stage 3, GFR 30-59 ml/min- unclear cause, probably multifactorial     7. Drug-induced constipation     8. DUC (generalized anxiety disorder)     9. Gastroesophageal reflux disease, esophagitis presence not specified     10. H/O non-ST elevation myocardial infarction (NSTEMI)     11. Hepatopulmonary syndrome (CMS-HCC)     12. MGUS (monoclonal gammopathy of unknown significance)     13. Mild aortic regurgitation and aortic valve sclerosis     14. Mild mitral regurgitation     15. Pulmonary hypertension     16. Pure hypercholesterolemia     17. Sarcoidosis (CMS-HCC)     18. SBO (small bowel obstruction)     19. Splenomegaly     20. Status post liver transplant Dr. Canada ([facility])     21. Thrombocytopenia (CMS-[facility])         Medical Decision Making:  Today's Assessment / Status / Plan:     57-year-old female with pulmonary hypertension on medications and stable at this point. She is stable from a pulmonary and cardiovascular standpoint. She will follow-up at [facility] next week for her redo abdominal surgery. We will see her back in 6 months.     1. PAH, WHO 1, WHO2, WHO 3 possible  - cont ambrisentan 10 daily  - cont tidalifil 40 daily  - cont lasix     2. ASD s/p Closure    - clinical f/u     3. Aortic Stenosis, mild     - clinical f/u    Thank for you allowing me to take part in your patient's care, please call should you have any questions or would like to discuss this patient.

## 2018-02-28 NOTE — ASSESSMENT & PLAN NOTE
December 2011: [place] [facility] [place].

## 2018-02-28 NOTE — TELEPHONE ENCOUNTER
Have we ever prescribed this med? Yes.  If yes, what date? unknown    Last OV: 11/22/17    Next OV: 3/5/18    DX: Chronic respiratory failure with hypoxia (CMS-HCC) (J96.11)    Medications: predniSONE (DELTASONE) 1 MG Tab    We will make no change in her current regimen.  She continues on supplemental oxygen.  Letairis and tadalafil will be continued  She will continue mycophenolate, Prograf, and prednisone at current dosage  We will see her in follow-up in 3 months or sooner if there are issues  She will need to continue her medications for pulmonary hypertension. If the current glance are withdrawn that she may need to seek alternative funding or change to alternative medications.

## 2018-02-28 NOTE — ASSESSMENT & PLAN NOTE
Stable. Currently taking Lantus 6 units daily as directed. Last HbA1c 6.3.  Not needing her humalog  Denies side effects or hypoglycemic events.  She is monitoring blood sugar regularly at home.  Reports diet is good  Last eye exam: last week - Dr. Acosta  Denies polyuria, polydipsia, blurred vision, or neuropathies.

## 2018-03-01 PROBLEM — T40.2X5A CONSTIPATION DUE TO OPIOID THERAPY: Status: ACTIVE | Noted: 2017-11-03

## 2018-03-01 PROBLEM — Z87.01 HISTORY OF ASPIRATION PNEUMONIA: Status: ACTIVE | Noted: 2018-02-06

## 2018-03-01 RX ORDER — GABAPENTIN 600 MG/1
600 TABLET ORAL 3 TIMES DAILY
Qty: 90 TAB | Refills: 2 | Status: SHIPPED | OUTPATIENT
Start: 2018-03-01 | End: 2018-05-19 | Stop reason: SDUPTHER

## 2018-03-01 NOTE — ASSESSMENT & PLAN NOTE
Patient is on 5 L of oxygen 24/7. She is followed by [facility] sarcoid clinic for this.

## 2018-03-01 NOTE — ASSESSMENT & PLAN NOTE
Patient is being followed by [facility] as she has complications from her gastric bypass surgery that was done 10 years ago. She has a hiatal hernia and has been having recurrent aspiration pneumonias. They're planning on doing a surgery where they attach the esophagus to the small intestine. That could be done as early as next week.

## 2018-03-01 NOTE — PROGRESS NOTES
Chief Complaint   Patient presents with   • Establish Care         Roxana Cuba is a 58 y.o. female here to establish care and for evaluation and management of:        HPI:    DUC (generalized anxiety disorder)  Patient has chronic anxiety.  She has been on alprazolam 1 mg TID for 10 years. She reports this allows her to function. She often feels overwhelmed by her chronic medical conditions. She has tried multiple other medications that this has been what is most effective.    Status post liver transplant Dr. Canada ([facility])  December 2011: Status post liver transplant for end stage liver disease at [facility], followed by Dr. Canada.     IDDM (insulin dependent diabetes mellitus) (CMS-HCC)  Stable. Currently taking Lantus 6 units daily as directed. Last HbA1c 6.3.  Not needing her humalog  Denies side effects or hypoglycemic events.  She is monitoring blood sugar regularly at home.  Reports diet is good  Last eye exam: last week - Dr. Acosta  Denies polyuria, polydipsia, blurred vision, or neuropathies.      MGUS (monoclonal gammopathy of unknown significance)  January 2013: PET scan with abnormal uptake, followed by Dr. Reza.    Hiatal hernia  Patient is being followed by [facility] as she has complications from her gastric bypass surgery that was done 10 years ago. She has a hiatal hernia and has been having recurrent aspiration pneumonias. They're planning on doing a surgery where they attach the esophagus to the small intestine. That could be done as early as next week.    CKD (chronic kidney disease) stage 3, GFR 30-59 ml/min- unclear cause, probably multifactorial  CKD: chronic condition which is stable. Currently avoids high dose NSAIDs. Denies nausea/vomiting, headache, loss of energy/unusual somnolence, mouth sores, skin discoloration or itching, muscle cramps. .       Chronic respiratory failure with hypoxia (CMS-HCC)  Patient is on 5 L of oxygen 24/7. She is followed by [facility] sarcoid clinic for  this.    Constipation due to opioid therapy  Patient has chronic constipation secondary to the opiate therapy. This is managed with Linzess and Relistor.  Patient has a caleb for the Relistor and needs a written prescription faxed to the program every 3 months. She would like that done today. She injects this daily. She is control rating this well. She had a colonoscopy with Dr. Bob in March 2017.      Allergies   Allergen Reactions   • Rhubarb Anaphylaxis   • Organic Nitrates      Nitroglycerin products should be avoided with the use of PDE-5 inhibitors as the combination can result in severe hypotension.   • Vancomycin Hcl      Causes red man syndrome when infused to fast         Current medicines (including changes today)  Current Outpatient Prescriptions   Medication Sig Dispense Refill   • methylnaltrexone (RELISTOR) 12 MG/0.6ML Solution Inject 0.6 mL as instructed 1 time daily as needed for up to 30 days. 30 Syringe 2   • ALPRAZolam (XANAX) 1 MG Tab Take 1 Tab by mouth 3 times a day for 90 days. 90 Tab 2   • Tadalafil, PAH, (ADCIRCA) 20 MG Tab Take 40 mg by mouth every bedtime. Indications: Pulmonary Arterial Hypertension 180 Tab 3   • predniSONE (DELTASONE) 1 MG Tab TAKE 1 TO 4 TABS BY MOUTH DAILY AS DIRECTED. 90 Tab 5   • omeprazole (PRILOSEC) 40 MG delayed-release capsule Take 1 Cap by mouth every day. 90 Cap 0   • aspirin 81 MG tablet Take 1 Tab by mouth every day. 90 Tab 0   • lactulose 10 GM/15ML Solution Take 30 mL by mouth 2 times a day. 1 Bottle 3   • gabapentin (NEURONTIN) 600 MG tablet Take 1 Tab by mouth 3 times a day. 90 Tab 0   • FREESTYLE LITE strip USE TO TEST EVERY DAY AS NEEDED SSX HIGH OR LOW  2   • polyethylene glycol 3350 (MIRALAX) Powder Take 17 g by mouth every day.     • NYSTOP (MYCOSTATIN) 382720 UNIT/GM Powder Apply 1 g to affected area(s) 3 times a day as needed (skin irritation).     • docusate sodium (COLACE) 100 MG Cap Take 100 mg by mouth 2 times a day as needed for Constipation.      • sennosides (SENOKOT) 8.6 MG Tab Take 8.6 mg by mouth 1 time daily as needed (constipation).     • Carboxymethylcell-Hypromellose (GENTEAL) 0.25-0.3 % Gel Place 0.3 mg in both eyes every day at 6 PM.     • Wheat Dextrin (BENEFIBER DRINK MIX PO) Take 7.4 g by mouth every day at 6 PM.     • non-formulary med Inhale 5 L/min by mouth Continuous. oxygen x nasal cannula     • predniSONE (DELTASONE) 5 MG Tab Take 5 mg by mouth every day. Total dose = 7 mg     • furosemide (LASIX) 40 MG Tab TAKE 1 TAB BY MOUTH EVERY DAY. 30 Tab 6   • oxycodone immediate release (ROXICODONE) 10 MG immediate release tablet Take 1 Tab by mouth 3 times a day as needed for Severe Pain for up to 30 days. 90 Tab 0   • Linaclotide (LINZESS) 290 MCG Cap Take 1 Cap by mouth every day. 90 Cap 3   • bisacodyl (DULCOLAX) 10 MG Suppos Insert 1 Suppository in rectum 1 time daily as needed (constipation). 30 Suppository 3   • trazodone (DESYREL) 50 MG Tab TAKE 1-2 TABS BY MOUTH EVERY BEDTIME. 180 Tab 3   • sertraline (ZOLOFT) 100 MG Tab Take 1 Tab by mouth every bedtime. 90 Tab 4   • fluticasone (FLONASE) 50 MCG/ACT nasal spray SPRAY 1 SPRAY IN EACH NOSTRIL TWICE A DAY 48 g 3   • levothyroxine (SYNTHROID) 137 MCG Tab Take 1 Tab by mouth Every morning on an empty stomach. 90 Tab 3   • ACIDOPHILUS LACTOBACILLUS PO Take 1 Capsule by mouth every day.     • tacrolimus (PROGRAF) 1 MG Cap TAKE 1 CAPSULE BY MOUTH EVERY MORNING AND 1 CAP IN THE EVENING-TAKE WITH 0.5MG  11   • morphine (MS IR) 15 MG tablet Take 1 Tab by mouth every four hours as needed for Severe Pain. 30 Tab 0   • NON SPECIFIED Take 1 Cap by mouth every evening. Bariatric Dietary Supplment      • tacrolimus (PROGRAF) 0.5 MG Cap Take 0.5 mg by mouth 2 Times a Day. Takes with 1 mg tacrolimus BID     • ambrisentan (LETAIRIS) 10 MG tablet Take 1 Tab by mouth every day. 30 Tab 11   • pravastatin (PRAVACHOL) 20 MG Tab TAKE 1 TAB BY MOUTH EVERY DAY. 30 Tab 11   • cyclobenzaprine (FLEXERIL) 10 MG Tab  Take 10 mg by mouth 3 times a day as needed for Muscle Spasms.     • ferrous sulfate (FEOSOL) 220 (44 FE) MG/5ML Elixir Take 5 mL by mouth every day. 1 Bottle 10   • CITRACAL MAXIMUM 315-250 MG-UNIT Tab TAKE 2 TABS BY MOUTH 2X/ DAY WITH FOOD BEFORE AND AFTER DINNER FOR LIFE-CRUSH 1ST 3 MONTH, SPACE  Tab 11   • ascorbic acid (ASCORBIC ACID) 500 MG Tab Take 500 mg by mouth every day.     • ondansetron (ZOFRAN ODT) 4 MG TABLET DISPERSIBLE Take 1 Tab by mouth every 8 hours as needed for Nausea/Vomiting. Nausea and vomiting 270 Tab 4   • tiotropium (SPIRIVA) 18 MCG Cap Inhale 1 Cap by mouth every day. 90 Cap 4   • mycophenolate (CELLCEPT) 250 MG Cap Take 500 mg by mouth 2 times a day.       No current facility-administered medications for this visit.      She  has a past medical history of Breath shortness; Bronchitis ( ); Cardiomegaly; Chronic fatigue and malaise; Cirrhosis (CMS-HCC) (December 2011); CKD (chronic kidney disease) stage 3, GFR 30-59 ml/min; Diabetes (CMS-HCC); Fracture of left foot; GERD (gastroesophageal reflux disease); H/O Clostridium difficile infection (02-17-17); Hypothyroid; Low back pain (02-17-17); Mild aortic regurgitation and aortic valve sclerosis ( ); On home oxygen therapy; Platelet disorder (CMS-HCC); Pneumonia; Psychiatric disorder; Pulmonary hypertension; S/P cholecystectomy; Small bowel obstruction; Splenomegaly; and VRE (vancomycin-resistant Enterococci).  She  has a past surgical history that includes pr anesth,nose,sinus surgery; makayla by laparoscopy (9/19/2009); exam under anesthesia (11/11/2009); hysterectomy, total abdominal; other; gastroscopy-endo (3/12/2010); bronchoscopy-endo (5/29/2012); bronchoscopy-endo (6/19/2012); sigmoidoscopy flex (9/26/2012); recovery (2/27/2013); bronchoscopy-endo (11/15/2013); recovery (1/21/2014); recovery (3/24/2014); hemorrhoidectomy (11/11/2009); gastroscopy (9/28/2012); carpal tunnel release; bone marrow aspiration (12/31/2012); bone  "marrow biopsy, ndl/trocar (2012); recovery (3/31/2014); other abdominal surgery (2011); bone marrow aspiration (3/16/2015); bone marrow biopsy, ndl/trocar (3/16/2015); lung biopsy open (2016); tonsillectomy; other abdominal surgery (); breast biopsy (Left, 2017); colonoscopy (N/A, 3/27/2017); gastroscopy (N/A, 3/27/2017); and other (2017).  Social History   Substance Use Topics   • Smoking status: Never Smoker   • Smokeless tobacco: Never Used      Comment: avoid all tobacco products   • Alcohol use No      Comment: 2009 quit drinking     Social History     Social History Narrative   • No narrative on file     Family History   Problem Relation Age of Onset   • Other Father      Unknown (dead 10 years)   • Diabetes Father    • Heart Disease Father    • Hypertension Father    • Hyperlipidemia Father    • Stroke Father    • Non-contributory Mother    • Hyperlipidemia Mother    • Alcohol/Drug Mother    • Cancer Paternal Aunt    • Alcohol/Drug Maternal Grandmother    • Alcohol/Drug Maternal Grandfather      Family Status   Relation Status   • Father    • Mother    • Sister Alive   • Paternal Aunt    • Maternal Grandmother    • Maternal Grandfather          ROS  No fever or chills.  No nausea or vomiting.  No chest pain or palpitations.  No cough or SOB.  No pain with urination or hematuria.  No black or bloody stools.  All other systems reviewed and are negative     Objective:     Blood pressure 120/72, pulse 68, temperature 36.2 °C (97.2 °F), resp. rate 16, height 1.72 m (5' 7.72\"), weight 78 kg (172 lb), last menstrual period 2000, SpO2 94 %. Body mass index is 26.37 kg/m².  Physical Exam:      Well developed, well nourished.  Alert, oriented in no acute distress.  Psych: Eye contact is good, speech goal directed, affect anxious and tearful at times  Eyes: conjunctiva non-injected, sclera non-icteric.  Neck Supple.  No adenopathy or masses in the neck or " supraclavicular regions. No thyromegaly  Lungs: clear to auscultation bilaterally with decreased excursion. No wheezes or rhonchi  CV: regular rate and rhythm. No murmur  Abdomen: soft, diffuse tenderness with splenomegaly of present, no rebound or guarding    Assessment and Plan:   The following treatment plan was discussed    1. Sarcoidosis (CMS-HCC)  Continue follow-up with rheumatology. She has somebody locally and renal as well as sarcoid clinic at [facility]    2. Status post liver transplant Dr. Canada ([facility])  Continue follow-up with [facility]    3. Drug-induced constipation  Continue current medications. Refills given  - methylnaltrexone (RELISTOR) 12 MG/0.6ML Solution; Inject 0.6 mL as instructed 1 time daily as needed for up to 30 days.  Dispense: 30 Syringe; Refill: 2    4. Chronic use of opiate drugs therapeutic purposes  Patient is followed by pain management for her narcotics  - methylnaltrexone (RELISTOR) 12 MG/0.6ML Solution; Inject 0.6 mL as instructed 1 time daily as needed for up to 30 days.  Dispense: 30 Syringe; Refill: 2    5. DUC (generalized anxiety disorder)  Discussed the increased risk of respiratory tests with her combination. Given her serious chronic medical conditions I believe that the benefits outweigh the risk. I discussed that she needs to see me every 3 months. Controlled substance agreement signed.  - ALPRAZolam (XANAX) 1 MG Tab; Take 1 Tab by mouth 3 times a day for 90 days.  Dispense: 90 Tab; Refill: 2  - Controlled Substance Treatment Agreement    6. IDDM (insulin dependent diabetes mellitus) (CMS-[facility])  Patient is not currently seen an endocrinologist. She has had good control and is checking her sugars regularly.    7. MGUS (monoclonal gammopathy of unknown significance)  Follow-up with oncology as scheduled      8. Chronic respiratory failure with hypoxia (CMS-HCC)  Continue continuous oxygen therapy    9. Hiatal hernia  Follow up with [facility] for possible surgery  10.  Chronic prescription benzodiazepine use  See #5  - Controlled Substance Treatment Agreement    11. Hepatopulmonary syndrome (CMS-HCC)  Follow up with hepatologist    12. CKD (chronic kidney disease) stage 3, GFR 30-59 ml/min- unclear cause, probably multifactorial  Avoid NSAIDs    13. Immunocompromised state (CMS-HCC)  Secondary infection prevention    14. H/O non-ST elevation myocardial infarction (NSTEMI)  Follow-up with cardiology    15. Splenomegaly  Follow-up with oncology    16. Pulmonary hypertension  Follow-up with sarcoid clinic    17. History of aspiration pneumonia  Follow-up with [facility] for planned surgery      POLST form filled out. Patient wants resuscitation including CPR. She does not want a feeding tube.        Records requested.      Any change or worsening of signs or symptoms, patient encouraged to follow-up or report to the emergency room for further evaluation. Patient understands and agrees.    Followup: Return in about 3 months (around 5/27/2018).

## 2018-03-01 NOTE — ASSESSMENT & PLAN NOTE
Patient has chronic constipation secondary to the opiate therapy. This is managed with Linzess and Relistor.  Patient has a caleb for the Relistor and needs a written prescription faxed to the program every 3 months. She would like that done today. She injects this daily. She is control rating this well. She had a colonoscopy with Dr. Bob in March 2017.

## 2018-03-02 ENCOUNTER — HOME CARE VISIT (OUTPATIENT)
Dept: HOME HEALTH SERVICES | Facility: HOME HEALTHCARE | Age: 58
End: 2018-03-02
Payer: MEDICARE

## 2018-03-02 VITALS
HEART RATE: 74 BPM | DIASTOLIC BLOOD PRESSURE: 70 MMHG | RESPIRATION RATE: 18 BRPM | OXYGEN SATURATION: 98 % | SYSTOLIC BLOOD PRESSURE: 138 MMHG | TEMPERATURE: 97.6 F

## 2018-03-02 DIAGNOSIS — J44.9 CHRONIC OBSTRUCTIVE PULMONARY DISEASE, UNSPECIFIED COPD TYPE (HCC): ICD-10-CM

## 2018-03-02 PROCEDURE — G0299 HHS/HOSPICE OF RN EA 15 MIN: HCPCS

## 2018-03-02 RX ORDER — PREDNISONE 5 MG/1
TABLET ORAL
Refills: 5 | OUTPATIENT
Start: 2018-03-02

## 2018-03-02 SDOH — ECONOMIC STABILITY: HOUSING INSECURITY: UNSAFE COOKING RANGE AREA: 0

## 2018-03-02 SDOH — ECONOMIC STABILITY: HOUSING INSECURITY: UNSAFE APPLIANCES: 0

## 2018-03-02 ASSESSMENT — ENCOUNTER SYMPTOMS
SHORTNESS OF BREATH: F
VOMITING: DENIES
RESPIRATORY SYMPTOMS COMMENTS: NO COUGH NOTED
NAUSEA: DENIES

## 2018-03-05 ENCOUNTER — TELEPHONE (OUTPATIENT)
Dept: MEDICAL GROUP | Facility: MEDICAL CENTER | Age: 58
End: 2018-03-05

## 2018-03-05 ENCOUNTER — APPOINTMENT (OUTPATIENT)
Dept: PULMONOLOGY | Facility: HOSPICE | Age: 58
End: 2018-03-05
Payer: MEDICARE

## 2018-03-05 DIAGNOSIS — G89.4 CHRONIC PAIN SYNDROME: ICD-10-CM

## 2018-03-05 DIAGNOSIS — Z94.4 STATUS POST LIVER TRANSPLANT (HCC): ICD-10-CM

## 2018-03-05 DIAGNOSIS — D86.9 SARCOIDOSIS: ICD-10-CM

## 2018-03-05 DIAGNOSIS — E27.1 ADDISON'S DISEASE (HCC): ICD-10-CM

## 2018-03-05 NOTE — TELEPHONE ENCOUNTER
1. Caller Name: Roxana Johansson                                           Call Back Number: mychart message       Patient approves a detailed voicemail message: yes    2. SPECIFIC Action To Be Taken: Orders pending, please sign.    3. Diagnosis/Clinical Reason for Request: Pain manageme nt     4. Specialty & Provider Name/Lab/Imaging Location: [facility]      5. Is appointment scheduled for requested order/referral: no

## 2018-03-06 DIAGNOSIS — Z79.891 CHRONIC USE OF OPIATE DRUGS THERAPEUTIC PURPOSES: ICD-10-CM

## 2018-03-06 DIAGNOSIS — K59.03 DRUG-INDUCED CONSTIPATION: ICD-10-CM

## 2018-03-07 ENCOUNTER — HOSPITAL ENCOUNTER (OUTPATIENT)
Dept: LAB | Facility: MEDICAL CENTER | Age: 58
End: 2018-03-07
Attending: INTERNAL MEDICINE
Payer: MEDICARE

## 2018-03-07 DIAGNOSIS — J44.9 CHRONIC OBSTRUCTIVE PULMONARY DISEASE, UNSPECIFIED COPD TYPE (HCC): ICD-10-CM

## 2018-03-07 LAB
ALBUMIN SERPL BCP-MCNC: 4.6 G/DL (ref 3.2–4.9)
ALBUMIN SERPL BCP-MCNC: 4.7 G/DL (ref 3.2–4.9)
ALBUMIN/GLOB SERPL: 1.8 G/DL
ALBUMIN/GLOB SERPL: 2 G/DL
ALP SERPL-CCNC: 32 U/L (ref 30–99)
ALP SERPL-CCNC: 33 U/L (ref 30–99)
ALT SERPL-CCNC: 25 U/L (ref 2–50)
ALT SERPL-CCNC: 26 U/L (ref 2–50)
ANION GAP SERPL CALC-SCNC: 7 MMOL/L (ref 0–11.9)
ANION GAP SERPL CALC-SCNC: 8 MMOL/L (ref 0–11.9)
AST SERPL-CCNC: 30 U/L (ref 12–45)
AST SERPL-CCNC: 31 U/L (ref 12–45)
BASOPHILS # BLD AUTO: 0.8 % (ref 0–1.8)
BASOPHILS # BLD AUTO: 1 % (ref 0–1.8)
BASOPHILS # BLD: 0.04 K/UL (ref 0–0.12)
BASOPHILS # BLD: 0.05 K/UL (ref 0–0.12)
BILIRUB SERPL-MCNC: 0.4 MG/DL (ref 0.1–1.5)
BILIRUB SERPL-MCNC: 0.4 MG/DL (ref 0.1–1.5)
BUN SERPL-MCNC: 27 MG/DL (ref 8–22)
BUN SERPL-MCNC: 27 MG/DL (ref 8–22)
CALCIUM SERPL-MCNC: 9.5 MG/DL (ref 8.5–10.5)
CALCIUM SERPL-MCNC: 9.5 MG/DL (ref 8.5–10.5)
CHLORIDE SERPL-SCNC: 103 MMOL/L (ref 96–112)
CHLORIDE SERPL-SCNC: 103 MMOL/L (ref 96–112)
CO2 SERPL-SCNC: 28 MMOL/L (ref 20–33)
CO2 SERPL-SCNC: 28 MMOL/L (ref 20–33)
CREAT SERPL-MCNC: 1.21 MG/DL (ref 0.5–1.4)
CREAT SERPL-MCNC: 1.22 MG/DL (ref 0.5–1.4)
EOSINOPHIL # BLD AUTO: 0.13 K/UL (ref 0–0.51)
EOSINOPHIL # BLD AUTO: 0.14 K/UL (ref 0–0.51)
EOSINOPHIL NFR BLD: 2.5 % (ref 0–6.9)
EOSINOPHIL NFR BLD: 2.8 % (ref 0–6.9)
ERYTHROCYTE [DISTWIDTH] IN BLOOD BY AUTOMATED COUNT: 47.5 FL (ref 35.9–50)
ERYTHROCYTE [DISTWIDTH] IN BLOOD BY AUTOMATED COUNT: 49.5 FL (ref 35.9–50)
GGT SERPL-CCNC: 10 U/L (ref 7–34)
GLOBULIN SER CALC-MCNC: 2.3 G/DL (ref 1.9–3.5)
GLOBULIN SER CALC-MCNC: 2.5 G/DL (ref 1.9–3.5)
GLUCOSE SERPL-MCNC: 97 MG/DL (ref 65–99)
GLUCOSE SERPL-MCNC: 97 MG/DL (ref 65–99)
HCT VFR BLD AUTO: 40.5 % (ref 37–47)
HCT VFR BLD AUTO: 41.6 % (ref 37–47)
HGB BLD-MCNC: 13 G/DL (ref 12–16)
HGB BLD-MCNC: 13.1 G/DL (ref 12–16)
IMM GRANULOCYTES # BLD AUTO: 0.03 K/UL (ref 0–0.11)
IMM GRANULOCYTES # BLD AUTO: 0.04 K/UL (ref 0–0.11)
IMM GRANULOCYTES NFR BLD AUTO: 0.6 % (ref 0–0.9)
IMM GRANULOCYTES NFR BLD AUTO: 0.8 % (ref 0–0.9)
LYMPHOCYTES # BLD AUTO: 1.02 K/UL (ref 1–4.8)
LYMPHOCYTES # BLD AUTO: 1.06 K/UL (ref 1–4.8)
LYMPHOCYTES NFR BLD: 20.1 % (ref 22–41)
LYMPHOCYTES NFR BLD: 20.6 % (ref 22–41)
MAGNESIUM SERPL-MCNC: 2.3 MG/DL (ref 1.5–2.5)
MCH RBC QN AUTO: 28.1 PG (ref 27–33)
MCH RBC QN AUTO: 28.3 PG (ref 27–33)
MCHC RBC AUTO-ENTMCNC: 31.5 G/DL (ref 33.6–35)
MCHC RBC AUTO-ENTMCNC: 32.1 G/DL (ref 33.6–35)
MCV RBC AUTO: 87.5 FL (ref 81.4–97.8)
MCV RBC AUTO: 89.8 FL (ref 81.4–97.8)
MONOCYTES # BLD AUTO: 0.26 K/UL (ref 0–0.85)
MONOCYTES # BLD AUTO: 0.31 K/UL (ref 0–0.85)
MONOCYTES NFR BLD AUTO: 5.2 % (ref 0–13.4)
MONOCYTES NFR BLD AUTO: 5.9 % (ref 0–13.4)
NEUTROPHILS # BLD AUTO: 3.46 K/UL (ref 2–7.15)
NEUTROPHILS # BLD AUTO: 3.69 K/UL (ref 2–7.15)
NEUTROPHILS NFR BLD: 69.8 % (ref 44–72)
NEUTROPHILS NFR BLD: 69.9 % (ref 44–72)
NRBC # BLD AUTO: 0 K/UL
NRBC # BLD AUTO: 0 K/UL
NRBC BLD-RTO: 0 /100 WBC
NRBC BLD-RTO: 0 /100 WBC
PLATELET # BLD AUTO: 100 K/UL (ref 164–446)
PLATELET # BLD AUTO: 93 K/UL (ref 164–446)
PMV BLD AUTO: 9.3 FL (ref 9–12.9)
PMV BLD AUTO: 9.7 FL (ref 9–12.9)
POTASSIUM SERPL-SCNC: 4.1 MMOL/L (ref 3.6–5.5)
POTASSIUM SERPL-SCNC: 4.1 MMOL/L (ref 3.6–5.5)
PROT SERPL-MCNC: 7 G/DL (ref 6–8.2)
PROT SERPL-MCNC: 7.1 G/DL (ref 6–8.2)
RBC # BLD AUTO: 4.63 M/UL (ref 4.2–5.4)
RBC # BLD AUTO: 4.63 M/UL (ref 4.2–5.4)
SODIUM SERPL-SCNC: 138 MMOL/L (ref 135–145)
SODIUM SERPL-SCNC: 139 MMOL/L (ref 135–145)
WBC # BLD AUTO: 5 K/UL (ref 4.8–10.8)
WBC # BLD AUTO: 5.3 K/UL (ref 4.8–10.8)

## 2018-03-07 PROCEDURE — 82977 ASSAY OF GGT: CPT

## 2018-03-07 PROCEDURE — 85025 COMPLETE CBC W/AUTO DIFF WBC: CPT

## 2018-03-07 PROCEDURE — 84165 PROTEIN E-PHORESIS SERUM: CPT

## 2018-03-07 PROCEDURE — 82784 ASSAY IGA/IGD/IGG/IGM EACH: CPT

## 2018-03-07 PROCEDURE — 80053 COMPREHEN METABOLIC PANEL: CPT | Mod: 91

## 2018-03-07 PROCEDURE — 83883 ASSAY NEPHELOMETRY NOT SPEC: CPT | Mod: 91

## 2018-03-07 PROCEDURE — 80053 COMPREHEN METABOLIC PANEL: CPT

## 2018-03-07 PROCEDURE — 80197 ASSAY OF TACROLIMUS: CPT

## 2018-03-07 PROCEDURE — 84160 ASSAY OF PROTEIN ANY SOURCE: CPT

## 2018-03-07 PROCEDURE — 83735 ASSAY OF MAGNESIUM: CPT

## 2018-03-07 PROCEDURE — 82232 ASSAY OF BETA-2 PROTEIN: CPT

## 2018-03-07 PROCEDURE — 84156 ASSAY OF PROTEIN URINE: CPT

## 2018-03-07 PROCEDURE — 85025 COMPLETE CBC W/AUTO DIFF WBC: CPT | Mod: 91

## 2018-03-07 PROCEDURE — 36415 COLL VENOUS BLD VENIPUNCTURE: CPT

## 2018-03-08 RX ORDER — PREDNISONE 5 MG/1
TABLET ORAL
Qty: 120 TAB | Refills: 1 | Status: SHIPPED | OUTPATIENT
Start: 2018-03-08 | End: 2018-04-27

## 2018-03-08 NOTE — TELEPHONE ENCOUNTER
Have we ever prescribed this med? Yes.  If yes, what date? 02/28/2018    Last OV: 11/22/2017 - Dr. Gaming     Next OV: none scheduled     DX: COPD    Medications: Prednisone

## 2018-03-08 NOTE — TELEPHONE ENCOUNTER
Have we ever prescribed this med? Yes.  If yes, what date? 02/28/18    Last Ov: 02/28/18    Next OV: 03/09/18    DX: COPD    Medications: predniSONE (DELTASONE) 5 MG

## 2018-03-09 ENCOUNTER — HOME CARE VISIT (OUTPATIENT)
Dept: HOME HEALTH SERVICES | Facility: HOME HEALTHCARE | Age: 58
End: 2018-03-09
Payer: MEDICARE

## 2018-03-09 LAB
B2 MICROGLOB SERPL-MCNC: 3.7 MG/L (ref 1.1–2.4)
IGA SERPL-MCNC: 139 MG/DL (ref 68–408)
IGG SERPL-MCNC: 745 MG/DL (ref 768–1632)
IGM SERPL-MCNC: 44 MG/DL (ref 35–263)
TACROLIMUS BLD-MCNC: 2.6 NG/ML

## 2018-03-10 LAB
ALBUMIN 24H UR QL ELPH: DETECTED
ALBUMIN SERPL-MCNC: 4.67 G/DL (ref 3.75–5.01)
ALPHA1 GLOB 24H UR QL ELPH: ABNORMAL
ALPHA1 GLOB SERPL ELPH-MCNC: 0.3 G/DL (ref 0.19–0.46)
ALPHA2 GLOB 24H UR QL ELPH: ABNORMAL
ALPHA2 GLOB SERPL ELPH-MCNC: 0.64 G/DL (ref 0.48–1.05)
B-GLOBULIN SERPL ELPH-MCNC: 0.71 G/DL (ref 0.48–1.1)
B-GLOBULIN UR QL ELPH: DETECTED
COLLECT DURATION TIME SPEC: ABNORMAL H
EER PROT ELECT SER Q1092: NORMAL
GAMMA GLOB SERPL ELPH-MCNC: 0.78 G/DL (ref 0.62–1.51)
GAMMA GLOB UR QL ELPH: DETECTED
INTERPRETATION SERPL IFE-IMP: NORMAL
INTERPRETATION UR IFE-IMP: ABNORMAL
KAPPA LC FREE SER-MCNC: 2.73 MG/DL (ref 0.33–1.94)
KAPPA LC FREE UR-MCNC: 1.04 MG/DL (ref 0.14–2.42)
KAPPA LC FREE/LAMBDA FREE SER NEPH: 1.87 {RATIO} (ref 0.26–1.65)
KAPPA LC FREE/LAMBDA FREE UR: 34.67 RATIO (ref 2.04–10.37)
LAMBDA LC FREE SERPL-MCNC: 1.46 MG/DL (ref 0.57–2.63)
LAMBDA LC FREE UR-MCNC: 0.03 MG/DL (ref 0.02–0.67)
PROT 24H UR-MRATE: ABNORMAL MG/D (ref 10–140)
PROT SERPL-MCNC: 7.1 G/DL (ref 6–8.3)
TOTAL VOLUME 1105: ABNORMAL ML

## 2018-03-16 ENCOUNTER — HOSPITAL ENCOUNTER (OUTPATIENT)
Dept: LAB | Facility: MEDICAL CENTER | Age: 58
End: 2018-03-16
Attending: INTERNAL MEDICINE
Payer: MEDICARE

## 2018-03-16 LAB
ALBUMIN SERPL BCP-MCNC: 4 G/DL (ref 3.2–4.9)
ALBUMIN/GLOB SERPL: 1.5 G/DL
ALP SERPL-CCNC: 23 U/L (ref 30–99)
ALT SERPL-CCNC: 17 U/L (ref 2–50)
ANION GAP SERPL CALC-SCNC: 8 MMOL/L (ref 0–11.9)
AST SERPL-CCNC: 20 U/L (ref 12–45)
BASOPHILS # BLD AUTO: 0.7 % (ref 0–1.8)
BASOPHILS # BLD: 0.03 K/UL (ref 0–0.12)
BILIRUB SERPL-MCNC: 0.3 MG/DL (ref 0.1–1.5)
BUN SERPL-MCNC: 11 MG/DL (ref 8–22)
CALCIUM SERPL-MCNC: 10.1 MG/DL (ref 8.5–10.5)
CHLORIDE SERPL-SCNC: 100 MMOL/L (ref 96–112)
CO2 SERPL-SCNC: 33 MMOL/L (ref 20–33)
CREAT SERPL-MCNC: 1.06 MG/DL (ref 0.5–1.4)
EOSINOPHIL # BLD AUTO: 0.25 K/UL (ref 0–0.51)
EOSINOPHIL NFR BLD: 6.2 % (ref 0–6.9)
ERYTHROCYTE [DISTWIDTH] IN BLOOD BY AUTOMATED COUNT: 48.8 FL (ref 35.9–50)
GGT SERPL-CCNC: 11 U/L (ref 7–34)
GLOBULIN SER CALC-MCNC: 2.6 G/DL (ref 1.9–3.5)
GLUCOSE SERPL-MCNC: 114 MG/DL (ref 65–99)
HCT VFR BLD AUTO: 34.6 % (ref 37–47)
HGB BLD-MCNC: 11.1 G/DL (ref 12–16)
IMM GRANULOCYTES # BLD AUTO: 0.03 K/UL (ref 0–0.11)
IMM GRANULOCYTES NFR BLD AUTO: 0.7 % (ref 0–0.9)
LYMPHOCYTES # BLD AUTO: 1 K/UL (ref 1–4.8)
LYMPHOCYTES NFR BLD: 24.8 % (ref 22–41)
MAGNESIUM SERPL-MCNC: 2 MG/DL (ref 1.5–2.5)
MCH RBC QN AUTO: 29.2 PG (ref 27–33)
MCHC RBC AUTO-ENTMCNC: 32.1 G/DL (ref 33.6–35)
MCV RBC AUTO: 91.1 FL (ref 81.4–97.8)
MONOCYTES # BLD AUTO: 0.31 K/UL (ref 0–0.85)
MONOCYTES NFR BLD AUTO: 7.7 % (ref 0–13.4)
NEUTROPHILS # BLD AUTO: 2.42 K/UL (ref 2–7.15)
NEUTROPHILS NFR BLD: 59.9 % (ref 44–72)
NRBC # BLD AUTO: 0 K/UL
NRBC BLD-RTO: 0 /100 WBC
PLATELET # BLD AUTO: 100 K/UL (ref 164–446)
PMV BLD AUTO: 9.4 FL (ref 9–12.9)
POTASSIUM SERPL-SCNC: 3.8 MMOL/L (ref 3.6–5.5)
PROT SERPL-MCNC: 6.6 G/DL (ref 6–8.2)
RBC # BLD AUTO: 3.8 M/UL (ref 4.2–5.4)
SODIUM SERPL-SCNC: 141 MMOL/L (ref 135–145)
WBC # BLD AUTO: 4 K/UL (ref 4.8–10.8)

## 2018-03-16 PROCEDURE — 36415 COLL VENOUS BLD VENIPUNCTURE: CPT

## 2018-03-16 PROCEDURE — 80197 ASSAY OF TACROLIMUS: CPT

## 2018-03-16 PROCEDURE — 85025 COMPLETE CBC W/AUTO DIFF WBC: CPT

## 2018-03-16 PROCEDURE — 82977 ASSAY OF GGT: CPT

## 2018-03-16 PROCEDURE — 83735 ASSAY OF MAGNESIUM: CPT

## 2018-03-16 PROCEDURE — 80053 COMPREHEN METABOLIC PANEL: CPT

## 2018-03-17 ENCOUNTER — HOME CARE VISIT (OUTPATIENT)
Dept: HOME HEALTH SERVICES | Facility: HOME HEALTHCARE | Age: 58
End: 2018-03-17
Payer: MEDICARE

## 2018-03-17 VITALS
HEART RATE: 66 BPM | DIASTOLIC BLOOD PRESSURE: 64 MMHG | RESPIRATION RATE: 18 BRPM | OXYGEN SATURATION: 99 % | WEIGHT: 173.6 LBS | BODY MASS INDEX: 27.19 KG/M2 | TEMPERATURE: 99.1 F | SYSTOLIC BLOOD PRESSURE: 105 MMHG

## 2018-03-17 DIAGNOSIS — J96.11 CHRONIC RESPIRATORY FAILURE WITH HYPOXIA (HCC): ICD-10-CM

## 2018-03-17 PROCEDURE — G0493 RN CARE EA 15 MIN HH/HOSPICE: HCPCS

## 2018-03-17 SDOH — ECONOMIC STABILITY: HOUSING INSECURITY
HOME SAFETY: OXYGEN SAFETY RISK ASSESSMENT PERFORMED. PATIENT DOES NOT HAVE A NO SMOKING SIGN POSTED IN THE HOME. PATIENT DOES HAVE A WORKING FIRE EXTINGUISHER PRESENT IN THE HOME. SMOKE ALARMS ARE PRESENT AND FUNCTIONAL ON EACH LEVEL OF THE HOME. PATIENT DOES HA

## 2018-03-17 SDOH — ECONOMIC STABILITY: HOUSING INSECURITY: UNSAFE COOKING RANGE AREA: 0

## 2018-03-17 SDOH — ECONOMIC STABILITY: HOUSING INSECURITY: UNSAFE APPLIANCES: 0

## 2018-03-17 SDOH — ECONOMIC STABILITY: HOUSING INSECURITY
HOME SAFETY: VE A FIRE ESCAPE PLAN DEVELOPED. PATIENT DOES NOT HAVE FLAMMABLE MATERIALS PRESENT IN THE HOME PRESENTING A FIRE HAZARD. NO EVIDENCE FOUND OF SMOKING MATERIALS PRESENT IN THE HOME.

## 2018-03-17 ASSESSMENT — ACTIVITIES OF DAILY LIVING (ADL)
OASIS_M1830: 00
HOME_HEALTH_OASIS: 01
HOME_HEALTH_OASIS: 00

## 2018-03-17 ASSESSMENT — ENCOUNTER SYMPTOMS
NAUSEA: PT DENIES ANY NAUSEA AT THIS TIME
VOMITING: PT DENIES ANY EMESIS AT THIS TIME

## 2018-03-17 ASSESSMENT — PATIENT HEALTH QUESTIONNAIRE - PHQ9
1. LITTLE INTEREST OR PLEASURE IN DOING THINGS: 00
2. FEELING DOWN, DEPRESSED, IRRITABLE, OR HOPELESS: 00

## 2018-03-18 LAB — TACROLIMUS BLD-MCNC: 2.8 NG/ML

## 2018-03-19 ENCOUNTER — TELEPHONE (OUTPATIENT)
Dept: HEALTH INFORMATION MANAGEMENT | Facility: OTHER | Age: 58
End: 2018-03-19

## 2018-03-19 ENCOUNTER — PATIENT OUTREACH (OUTPATIENT)
Dept: HEALTH INFORMATION MANAGEMENT | Facility: OTHER | Age: 58
End: 2018-03-19

## 2018-03-19 NOTE — TELEPHONE ENCOUNTER
Medications reviewed. Interaction noted between ondansetron and tacrolimus / trazodone for increased risk of qt prolongation. Interaction noted between cyclobenzaprine and sertraline for increased risk of serotonin syndrome. Interaction identified between mycophenolate and omeprazole. PPI could reduce serum concentration of mycophenolate. Interaction identified between tacrolimus and omeprazole. PPI could increase serum concentration of tacrolimus. Recommended to monitor therapy. Per chart review, patient has been on the combination of these medications for years. Previously discussed interactions with patient on 01/29/18.     Cheli Petit, Rosa IselaD

## 2018-03-19 NOTE — PROGRESS NOTES
Received referral from [facility] for med rec. Medications reviewed. Interaction noted between ondansetron and tacrolimus / trazodone for increased risk of qt prolongation. Interaction noted between cyclobenzaprine and sertraline for increased risk of serotonin syndrome. Interaction identified between mycophenolate and omeprazole. PPI could reduce serum concentration of mycophenolate. Interaction identified between tacrolimus and omeprazole. PPI could increase serum concentration of tacrolimus. Recommended to monitor therapy. Per chart review, patient has been on the combination of these medications for years. Previously discussed interactions with patient on 01/29/18.     Cheli Petit, Rosa IselaD

## 2018-03-20 ENCOUNTER — HOSPITAL ENCOUNTER (OUTPATIENT)
Dept: RADIOLOGY | Facility: MEDICAL CENTER | Age: 58
End: 2018-03-20
Attending: FAMILY MEDICINE
Payer: MEDICARE

## 2018-03-20 DIAGNOSIS — Z12.39 SCREENING BREAST EXAMINATION: ICD-10-CM

## 2018-03-20 PROCEDURE — 77067 SCR MAMMO BI INCL CAD: CPT

## 2018-03-20 RX ORDER — TIOTROPIUM BROMIDE 18 UG/1
CAPSULE ORAL; RESPIRATORY (INHALATION)
Qty: 30 CAP | Refills: 6 | Status: SHIPPED | OUTPATIENT
Start: 2018-03-20 | End: 2018-09-28

## 2018-03-21 ENCOUNTER — OFFICE VISIT (OUTPATIENT)
Dept: PULMONOLOGY | Facility: HOSPICE | Age: 58
End: 2018-03-21
Payer: MEDICARE

## 2018-03-21 ENCOUNTER — HOME CARE VISIT (OUTPATIENT)
Dept: HOME HEALTH SERVICES | Facility: HOME HEALTHCARE | Age: 58
End: 2018-03-21
Payer: MEDICARE

## 2018-03-21 VITALS
HEART RATE: 62 BPM | DIASTOLIC BLOOD PRESSURE: 70 MMHG | RESPIRATION RATE: 18 BRPM | SYSTOLIC BLOOD PRESSURE: 110 MMHG | TEMPERATURE: 98.4 F | WEIGHT: 176 LBS | BODY MASS INDEX: 27.62 KG/M2 | HEIGHT: 67 IN | OXYGEN SATURATION: 97 %

## 2018-03-21 VITALS
DIASTOLIC BLOOD PRESSURE: 62 MMHG | TEMPERATURE: 98 F | SYSTOLIC BLOOD PRESSURE: 116 MMHG | HEART RATE: 61 BPM | RESPIRATION RATE: 18 BRPM | OXYGEN SATURATION: 98 %

## 2018-03-21 DIAGNOSIS — F41.9 ANXIETY: ICD-10-CM

## 2018-03-21 DIAGNOSIS — G47.33 OBSTRUCTIVE SLEEP APNEA: ICD-10-CM

## 2018-03-21 DIAGNOSIS — Z94.4 STATUS POST LIVER TRANSPLANT (HCC): ICD-10-CM

## 2018-03-21 DIAGNOSIS — I27.20 PULMONARY HYPERTENSION (HCC): ICD-10-CM

## 2018-03-21 DIAGNOSIS — D84.9 IMMUNOCOMPROMISED STATE (HCC): ICD-10-CM

## 2018-03-21 DIAGNOSIS — D86.9 SARCOIDOSIS: ICD-10-CM

## 2018-03-21 DIAGNOSIS — Z98.890 HISTORY OF LUNG BIOPSY: ICD-10-CM

## 2018-03-21 DIAGNOSIS — Q21.11 OSTIUM SECUNDUM TYPE ATRIAL SEPTAL DEFECT: ICD-10-CM

## 2018-03-21 DIAGNOSIS — R09.02 HYPOXEMIA: ICD-10-CM

## 2018-03-21 DIAGNOSIS — J69.0 ASPIRATION PNEUMONIA OF BOTH LOWER LOBES DUE TO GASTRIC SECRETIONS (HCC): ICD-10-CM

## 2018-03-21 DIAGNOSIS — Z98.84 HISTORY OF ROUX-EN-Y GASTRIC BYPASS: ICD-10-CM

## 2018-03-21 DIAGNOSIS — K76.81 HEPATOPULMONARY SYNDROME (HCC): ICD-10-CM

## 2018-03-21 PROCEDURE — 99215 OFFICE O/P EST HI 40 MIN: CPT | Performed by: INTERNAL MEDICINE

## 2018-03-21 PROCEDURE — G0299 HHS/HOSPICE OF RN EA 15 MIN: HCPCS

## 2018-03-21 RX ORDER — SERTRALINE HYDROCHLORIDE 100 MG/1
100 TABLET, FILM COATED ORAL
COMMUNITY
Start: 2018-03-12 | End: 2018-04-04

## 2018-03-21 RX ORDER — ALPRAZOLAM 1 MG/1
1 TABLET ORAL
COMMUNITY
Start: 2018-03-12 | End: 2018-04-04

## 2018-03-21 RX ORDER — FUROSEMIDE 40 MG/1
40 TABLET ORAL
COMMUNITY
Start: 2018-03-12 | End: 2018-04-04

## 2018-03-21 RX ORDER — GABAPENTIN 800 MG/1
800 TABLET ORAL 3 TIMES DAILY
Refills: 3 | COMMUNITY
Start: 2018-02-01 | End: 2018-04-04

## 2018-03-21 RX ORDER — TADALAFIL 10 MG/1
40 TABLET ORAL
COMMUNITY
Start: 2018-03-12 | End: 2018-04-04

## 2018-03-21 RX ORDER — TACROLIMUS 1 MG/1
1 CAPSULE ORAL
COMMUNITY
Start: 2018-03-12 | End: 2018-04-04

## 2018-03-21 RX ORDER — WHEAT DEXTRIN 3 G/3.8 G
1 POWDER (GRAM) ORAL DAILY
COMMUNITY
End: 2018-11-19

## 2018-03-21 RX ORDER — TACROLIMUS 0.5 MG/1
0.5 CAPSULE ORAL
COMMUNITY
Start: 2018-03-12 | End: 2018-04-04

## 2018-03-21 RX ORDER — PREDNISONE 5 MG/1
5 TABLET ORAL
COMMUNITY
Start: 2018-03-12 | End: 2018-04-04

## 2018-03-21 RX ORDER — ASPIRIN 81 MG/1
81 TABLET ORAL
Refills: 0 | COMMUNITY
Start: 2018-02-26 | End: 2018-04-04

## 2018-03-21 RX ORDER — TRAZODONE HYDROCHLORIDE 50 MG/1
100 TABLET ORAL
COMMUNITY
Start: 2018-03-12 | End: 2018-04-04

## 2018-03-21 RX ORDER — DIAPER,BRIEF,ADULT, DISPOSABLE
EACH MISCELLANEOUS
COMMUNITY
End: 2018-04-04

## 2018-03-21 RX ORDER — MORPHINE SULFATE 10 MG/5ML
15-20 SOLUTION ORAL
COMMUNITY
Start: 2018-03-12 | End: 2018-04-04

## 2018-03-21 RX ORDER — GABAPENTIN 600 MG/1
600 TABLET ORAL
COMMUNITY
Start: 2018-03-12 | End: 2018-04-04

## 2018-03-21 RX ORDER — DOCUSATE SODIUM 100 MG/1
100 CAPSULE, LIQUID FILLED ORAL
COMMUNITY
Start: 2018-03-12 | End: 2018-04-04

## 2018-03-21 RX ORDER — FLUTICASONE PROPIONATE 50 MCG
50 SPRAY, SUSPENSION (ML) NASAL
COMMUNITY
End: 2018-04-04

## 2018-03-21 RX ORDER — PREDNISONE 1 MG/1
2 TABLET ORAL
COMMUNITY
Start: 2018-03-12 | End: 2018-04-04

## 2018-03-21 RX ORDER — ASPIRIN 81 MG/1
81 TABLET, CHEWABLE ORAL
COMMUNITY
Start: 2018-03-12 | End: 2018-04-04

## 2018-03-21 RX ORDER — AMBRISENTAN 10 MG/1
10 TABLET, FILM COATED ORAL
COMMUNITY
Start: 2018-03-12 | End: 2018-04-04

## 2018-03-21 RX ORDER — LEVOTHYROXINE SODIUM 137 UG/1
137 TABLET ORAL
COMMUNITY
Start: 2018-03-12 | End: 2018-04-04

## 2018-03-21 RX ORDER — NALOXEGOL OXALATE 25 MG/1
TABLET, FILM COATED ORAL
Refills: 1 | COMMUNITY
Start: 2018-02-12 | End: 2018-04-27

## 2018-03-21 RX ORDER — SENNOSIDES A AND B 8.6 MG/1
17.2 TABLET, FILM COATED ORAL
COMMUNITY
Start: 2018-03-12 | End: 2018-04-04

## 2018-03-21 RX ORDER — PRAVASTATIN SODIUM 20 MG
20 TABLET ORAL
COMMUNITY
Start: 2018-03-12 | End: 2018-04-04

## 2018-03-21 SDOH — ECONOMIC STABILITY: HOUSING INSECURITY: UNSAFE APPLIANCES: 0

## 2018-03-21 SDOH — ECONOMIC STABILITY: HOUSING INSECURITY: UNSAFE COOKING RANGE AREA: 0

## 2018-03-21 ASSESSMENT — ENCOUNTER SYMPTOMS
SHORTNESS OF BREATH: T
VOMITING: DENIES
NAUSEA: DENIES

## 2018-03-22 NOTE — PROGRESS NOTES
"Chief Complaint   Patient presents with   • Follow-Up     Sarcoidosis       HPI:  Please see my prior notes concerning this very complex patient.  She was once again hospitalized with pneumonia in January. There was a suspicion of microaspiration and she had been placed on Prilosec which seemed to be improving her respiratory problems.  Remarkably she had had a CT scan ordered on January 19 to follow up previous infiltrates. Her outpatient scan on January 19 showed no evidence of significant pulmonary infiltrates. On January 20 the patient may have had another aspiration episode at night. She came to the emergency room with bilateral lower lobe pulmonary infiltrates and was admitted with a diagnosis of pneumonia and sepsis.  She has a history of a gastric sleeve. Because of her recurrent aspiration she had repeat surgery at [facility] by Dr. Jimenez on 3/19/18. She had a laparoscopic Evon-en-Y gastric bypass that was a conversion from her previous sleeve gastrectomy.   On today's evaluation she is not complaining of any significant dyspnea at rest on supplemental oxygen. Her abdomen is still somewhat distended after surgery. However, she appears to be recovering nicely.  We reviewed her medications and she continues now on all her preop medications.    Past Medical History:   Diagnosis Date   • Breath shortness    • Bronchitis      2016   • Cardiomegaly    • Chronic fatigue and malaise    • Cirrhosis (CMS-HCC) December 2011    Status post liver transplant at [facility]   • CKD (chronic kidney disease) stage 3, GFR 30-59 ml/min    • Diabetes (CMS-[facility])     reports hx of, resolved w/weight loss. Reports still checking bloodsugars twice daily and using insulin PRN   • Fracture of left foot    • GERD (gastroesophageal reflux disease)         • H/O Clostridium difficile infection 02-17-17    reports \"16 months ago\". Current stool sample 01-26-17 neg   • Hypothyroid    • Low back pain 02-17-17    and left foot, 7/10   • Mild aortic " regurgitation and aortic valve sclerosis     • On home oxygen therapy     4 liters, Dr. Gaming   • Platelet disorder (CMS-[facility])     low platelets   • Pneumonia     aspiration,    • Psychiatric disorder     Mood disorder   • Pulmonary hypertension        • S/P cholecystectomy    • Small bowel obstruction     01-   • Splenomegaly    • VRE (vancomycin-resistant Enterococci)     02-17-17, pt declines knowledge of this       ROS:   Constitutional: Denies fevers, chills, night sweats,  or weight loss.She has chronic fatigue  Eyes: Denies vision loss, pain, drainage, double vision  Ears, Nose, Throat: Denies earache, tinnitus, hoarseness  Cardiovascular: Denies chest pain, tightness, palpitations  Respiratory: See history of present illness  Sleep: She is on ASV  GI: Recent abdominal surgery. See history of present illness  : Denies frequent urination, hematuria, painful urination  Musculoskeletal: Denies back pain, painful joints, sore muscles  Neurological: Denies headaches, seizures  Skin: Denies rashes, color changes  Psychiatric: Denies depression or thoughts of suicide. Significant anxiety over her medical situation  Hematologic: Denies bleeding tendency or clotting tendency  Allergic/Immunologic: Denies rhinitis, skin sensitivity    Social History     Social History   • Marital status:      Spouse name: N/A   • Number of children: N/A   • Years of education: N/A     Occupational History   • Not on file.     Social History Main Topics   • Smoking status: Never Smoker   • Smokeless tobacco: Never Used      Comment: avoid all tobacco products   • Alcohol use No      Comment: 05/2009 quit drinking   • Drug use: No   • Sexual activity: Not on file     Other Topics Concern   • Not on file     Social History Narrative   • No narrative on file     Rhubarb; Organic nitrates; and Vancomycin hcl  Current Outpatient Prescriptions on File Prior to Visit   Medication Sig Dispense Refill   • SPIRIVA  HANDIHALER 18 MCG Cap INHALE 1 CAP BY MOUTH DAILY. 30 Cap 6   • predniSONE (DELTASONE) 5 MG Tab TAKE 1 TO 4 TABLETS BY MOUTH EVERY DAY AS DIRECTED 120 Tab 1   • gabapentin (NEURONTIN) 600 MG tablet Take 1 Tab by mouth 3 times a day. 90 Tab 2   • predniSONE (DELTASONE) 1 MG Tab TAKE 1 TO 4 TABS BY MOUTH DAILY AS DIRECTED. 90 Tab 5   • methylnaltrexone (RELISTOR) 12 MG/0.6ML Solution Inject 0.6 mL as instructed 1 time daily as needed for up to 30 days. 30 Syringe 2   • ALPRAZolam (XANAX) 1 MG Tab Take 1 Tab by mouth 3 times a day for 90 days. 90 Tab 2   • omeprazole (PRILOSEC) 40 MG delayed-release capsule Take 1 Cap by mouth every day. 90 Cap 0   • aspirin 81 MG tablet Take 1 Tab by mouth every day. 90 Tab 0   • docusate sodium (COLACE) 100 MG Cap Take 100 mg by mouth 2 times a day as needed for Constipation.     • sennosides (SENOKOT) 8.6 MG Tab Take 8.6 mg by mouth 1 time daily as needed (constipation).     • furosemide (LASIX) 40 MG Tab TAKE 1 TAB BY MOUTH EVERY DAY. 30 Tab 6   • trazodone (DESYREL) 50 MG Tab TAKE 1-2 TABS BY MOUTH EVERY BEDTIME. 180 Tab 3   • sertraline (ZOLOFT) 100 MG Tab Take 1 Tab by mouth every bedtime. 90 Tab 4   • fluticasone (FLONASE) 50 MCG/ACT nasal spray SPRAY 1 SPRAY IN EACH NOSTRIL TWICE A DAY 48 g 3   • levothyroxine (SYNTHROID) 137 MCG Tab Take 1 Tab by mouth Every morning on an empty stomach. 90 Tab 3   • ambrisentan (LETAIRIS) 10 MG tablet Take 1 Tab by mouth every day. 30 Tab 11   • pravastatin (PRAVACHOL) 20 MG Tab TAKE 1 TAB BY MOUTH EVERY DAY. 30 Tab 11   • Tadalafil, PAH, (ADCIRCA) 20 MG Tab Take 40 mg by mouth every bedtime. Indications: Pulmonary Arterial Hypertension 180 Tab 3   • ferrous sulfate (FEOSOL) 220 (44 FE) MG/5ML Elixir Take 5 mL by mouth every day. 1 Bottle 10   • CITRACAL MAXIMUM 315-250 MG-UNIT Tab TAKE 2 TABS BY MOUTH 2X/ DAY WITH FOOD BEFORE AND AFTER DINNER FOR LIFE-CRUSH 1ST 3 MONTH, SPACE  Tab 11   • ondansetron (ZOFRAN ODT) 4 MG TABLET  DISPERSIBLE Take 1 Tab by mouth every 8 hours as needed for Nausea/Vomiting. Nausea and vomiting 270 Tab 4   • morphine 10 MG/5ML Solution Take 20 mg by mouth 4 times a day as needed (pain).     • omeprazole (PRILOSEC OTC) 20 MG tablet Take 40 mg by mouth every day.     • lactulose 10 GM/15ML Solution Take 30 mL by mouth 2 times a day. (Patient taking differently: Take 30 mL by mouth every day.) 1 Bottle 3   • FREESTYLE LITE strip USE TO TEST EVERY DAY AS NEEDED SSX HIGH OR LOW  2   • polyethylene glycol 3350 (MIRALAX) Powder Take 17 g by mouth every day.     • Carboxymethylcell-Hypromellose (GENTEAL) 0.25-0.3 % Gel Place 0.3 mg in both eyes every day at 6 PM.     • Wheat Dextrin (BENEFIBER DRINK MIX PO) Take 7.4 g by mouth every day at 6 PM.     • non-formulary med Inhale 5 L/min by mouth Continuous. oxygen x nasal cannula     • predniSONE (DELTASONE) 5 MG Tab Take 5 mg by mouth every day. Total dose = 7 mg     • Linaclotide (LINZESS) 290 MCG Cap Take 1 Cap by mouth every day. 90 Cap 3   • bisacodyl (DULCOLAX) 10 MG Suppos Insert 1 Suppository in rectum 1 time daily as needed (constipation). 30 Suppository 3   • tacrolimus (PROGRAF) 1 MG Cap TAKE 1 CAPSULE BY MOUTH EVERY MORNING AND 1 CAP IN THE EVENING-TAKE WITH 0.5MG  11   • morphine (MS IR) 15 MG tablet Take 1 Tab by mouth every four hours as needed for Severe Pain. (Patient not taking: Reported on 3/17/2018) 30 Tab 0   • NON SPECIFIED Take 1 Cap by mouth every evening. Bariatric Dietary Supplment      • tacrolimus (PROGRAF) 0.5 MG Cap Take 0.5 mg by mouth 2 Times a Day. Takes with 1 mg tacrolimus BID     • cyclobenzaprine (FLEXERIL) 10 MG Tab Take 10 mg by mouth 3 times a day as needed for Muscle Spasms.     • ascorbic acid (ASCORBIC ACID) 500 MG Tab Take 500 mg by mouth every day.     • mycophenolate (CELLCEPT) 250 MG Cap Take 500 mg by mouth 2 times a day.       No current facility-administered medications on file prior to visit.      Blood pressure 110/70,  "pulse 62, temperature 36.9 °C (98.4 °F), resp. rate 18, height 1.702 m (5' 7\"), weight 79.8 kg (176 lb), last menstrual period 01/03/2000, SpO2 97 %.  Family History   Problem Relation Age of Onset   • Other Father      Unknown (dead 10 years)   • Diabetes Father    • Heart Disease Father    • Hypertension Father    • Hyperlipidemia Father    • Stroke Father    • Non-contributory Mother    • Hyperlipidemia Mother    • Alcohol/Drug Mother    • Cancer Paternal Aunt    • Alcohol/Drug Maternal Grandmother    • Alcohol/Drug Maternal Grandfather        Physical Exam: No distress at rest on supplemental oxygen   HEENT: PERRLA, EOMI, no scleral icterus, no nasal or oral lesions  Neck: No thyromegaly, no adenopathy, no bruits  Mallampatti: Grade II  Lungs: Equal breath sounds, no wheezes or crackles  Heart: Regular rate and rhythm, no gallops or murmurs  Abdomen: Trocar and scope sites are healing. She is mildly distended.  Extremities: No clubbing, cyanosis, or edema  Neurologic: Cranial nerve, motor, and sensory exam are normal    1. Hypoxemia    2. Pulmonary hypertension    3. Status post liver transplant Dr. Canada ([facility])    4. Sarcoidosis (CMS-HCC)    5. Hepatopulmonary syndrome (CMS-HCC)    6. Immunocompromised state (CMS-HCC)    7. Obstructive sleep apnea    8. Ostium secundum type atrial septal defect, S/P percutaneous closure    9. History of lung biopsy    10. Anxiety    11. Aspiration pneumonia of both lower lobes due to gastric secretions (CMS-HCC)    12. History of Evon-en-Y gastric bypass        We will make no change in her current regimen.  She continues on supplemental oxygen.  Letairis and tadalafil will be continued  She will continue mycophenolate, Prograf, and prednisone at current dosage  We will see her in follow-up in 6 months or sooner if there are issues  Hopefully, her recurrent aspiration will be controlled and she will avoid further hospitalizations for her recurrent pneumonias.  "

## 2018-03-27 ENCOUNTER — HOME CARE VISIT (OUTPATIENT)
Dept: HOME HEALTH SERVICES | Facility: HOME HEALTHCARE | Age: 58
End: 2018-03-27
Payer: MEDICARE

## 2018-03-27 DIAGNOSIS — R73.03 PREDIABETES: ICD-10-CM

## 2018-03-27 RX ORDER — BLOOD-GLUCOSE METER
1 KIT MISCELLANEOUS 2 TIMES DAILY
Qty: 100 STRIP | Refills: 1 | Status: SHIPPED | OUTPATIENT
Start: 2018-03-27 | End: 2018-04-27

## 2018-03-29 ENCOUNTER — HOME CARE VISIT (OUTPATIENT)
Dept: HOME HEALTH SERVICES | Facility: HOME HEALTHCARE | Age: 58
End: 2018-03-29
Payer: MEDICARE

## 2018-03-29 VITALS
TEMPERATURE: 97.9 F | DIASTOLIC BLOOD PRESSURE: 62 MMHG | RESPIRATION RATE: 18 BRPM | WEIGHT: 171 LBS | HEART RATE: 68 BPM | SYSTOLIC BLOOD PRESSURE: 110 MMHG | OXYGEN SATURATION: 98 % | BODY MASS INDEX: 26.84 KG/M2 | HEIGHT: 67 IN

## 2018-03-29 PROCEDURE — G0493 RN CARE EA 15 MIN HH/HOSPICE: HCPCS

## 2018-03-29 SDOH — ECONOMIC STABILITY: HOUSING INSECURITY
HOME SAFETY: OXYGEN SAFETY RISK ASSESSMENT PERFORMED. PATIENT DOES HAVE A NO SMOKING SIGN POSTED IN THE HOME. PATIENT DOES HAVE A WORKING FIRE EXTINGUISHER PRESENT IN THE HOME. SMOKE ALARMS ARE PRESENT AND FUNCTIONAL ON EACH LEVEL OF THE HOME. PATIENT DOES HAVE A

## 2018-03-29 SDOH — ECONOMIC STABILITY: HOUSING INSECURITY
HOME SAFETY: FIRE ESCAPE PLAN DEVELOPED. PATIENT DOES NOT HAVE FLAMMABLE MATERIALS PRESENT IN THE HOME PRESENTING A FIRE HAZARD. NO EVIDENCE FOUND OF SMOKING MATERIALS PRESENT IN THE HOME.

## 2018-03-29 SDOH — ECONOMIC STABILITY: HOUSING INSECURITY: UNSAFE COOKING RANGE AREA: 0

## 2018-03-29 SDOH — ECONOMIC STABILITY: HOUSING INSECURITY: UNSAFE APPLIANCES: 0

## 2018-03-29 ASSESSMENT — ACTIVITIES OF DAILY LIVING (ADL): OASIS_M1830: 00

## 2018-03-29 ASSESSMENT — ENCOUNTER SYMPTOMS
NAUSEA: DENIES
VOMITING: DENIES
SHORTNESS OF BREATH: T

## 2018-04-04 ENCOUNTER — HOME CARE VISIT (OUTPATIENT)
Dept: HOME HEALTH SERVICES | Facility: HOME HEALTHCARE | Age: 58
End: 2018-04-04
Payer: MEDICARE

## 2018-04-04 VITALS
RESPIRATION RATE: 18 BRPM | HEART RATE: 71 BPM | TEMPERATURE: 98.2 F | OXYGEN SATURATION: 96 % | SYSTOLIC BLOOD PRESSURE: 128 MMHG | DIASTOLIC BLOOD PRESSURE: 64 MMHG

## 2018-04-04 PROCEDURE — G0299 HHS/HOSPICE OF RN EA 15 MIN: HCPCS

## 2018-04-04 PROCEDURE — 665003 FOLLOW UP-HOME HEALTH

## 2018-04-04 SDOH — ECONOMIC STABILITY: HOUSING INSECURITY: UNSAFE APPLIANCES: 0

## 2018-04-04 SDOH — ECONOMIC STABILITY: HOUSING INSECURITY: UNSAFE COOKING RANGE AREA: 0

## 2018-04-04 ASSESSMENT — ENCOUNTER SYMPTOMS
NAUSEA: DENIES
VOMITING: DENIES

## 2018-04-05 ENCOUNTER — PATIENT MESSAGE (OUTPATIENT)
Dept: MEDICAL GROUP | Facility: MEDICAL CENTER | Age: 58
End: 2018-04-05

## 2018-04-05 ENCOUNTER — TELEPHONE (OUTPATIENT)
Dept: PULMONOLOGY | Facility: HOSPICE | Age: 58
End: 2018-04-05

## 2018-04-05 NOTE — TELEPHONE ENCOUNTER
PT will bring a new airline form for oxygen use to the office on 4/6 for Dr. Gaming to sign next week while he is in the office. She needs this for a trip to [facility].  She is also wanting a script to put an [employer] concentrator and Shakira talked with her and will help get this process completed. PT wants to pay out of pocket for now so she will contact [employer] directly and have them fax a prescription request to us to complete for her.

## 2018-04-10 ENCOUNTER — TELEPHONE (OUTPATIENT)
Dept: PULMONOLOGY | Facility: HOSPICE | Age: 58
End: 2018-04-10

## 2018-04-10 PROCEDURE — G0179 MD RECERTIFICATION HHA PT: HCPCS | Performed by: FAMILY MEDICINE

## 2018-04-10 NOTE — TELEPHONE ENCOUNTER
I called patient to see what type of letter she is needing for her upcoming airplane flight. Roxana states she had to call and speak to Shakira today and Shakira was going to e-mail  To her her last office note and form for [employer]ogen one. If problems with the airline or with receiving our e-mail she will contact us back.

## 2018-04-11 ENCOUNTER — HOSPITAL ENCOUNTER (OUTPATIENT)
Dept: LAB | Facility: MEDICAL CENTER | Age: 58
End: 2018-04-11
Attending: INTERNAL MEDICINE
Payer: MEDICARE

## 2018-04-11 ENCOUNTER — HOME CARE VISIT (OUTPATIENT)
Dept: HOME HEALTH SERVICES | Facility: HOME HEALTHCARE | Age: 58
End: 2018-04-11
Payer: MEDICARE

## 2018-04-11 ENCOUNTER — NON-PROVIDER VISIT (OUTPATIENT)
Dept: MEDICAL GROUP | Facility: MEDICAL CENTER | Age: 58
End: 2018-04-11
Payer: MEDICARE

## 2018-04-11 DIAGNOSIS — Z23 ENCOUNTER FOR VACCINATION: ICD-10-CM

## 2018-04-11 LAB
ALBUMIN SERPL BCP-MCNC: 3.8 G/DL (ref 3.2–4.9)
ALBUMIN/GLOB SERPL: 1.7 G/DL
ALP SERPL-CCNC: 19 U/L (ref 30–99)
ALT SERPL-CCNC: 13 U/L (ref 2–50)
ANION GAP SERPL CALC-SCNC: 7 MMOL/L (ref 0–11.9)
AST SERPL-CCNC: 19 U/L (ref 12–45)
BASOPHILS # BLD AUTO: 1 % (ref 0–1.8)
BASOPHILS # BLD: 0.03 K/UL (ref 0–0.12)
BILIRUB SERPL-MCNC: 0.4 MG/DL (ref 0.1–1.5)
BUN SERPL-MCNC: 15 MG/DL (ref 8–22)
CALCIUM SERPL-MCNC: 9.2 MG/DL (ref 8.5–10.5)
CHLORIDE SERPL-SCNC: 106 MMOL/L (ref 96–112)
CO2 SERPL-SCNC: 29 MMOL/L (ref 20–33)
CREAT SERPL-MCNC: 0.94 MG/DL (ref 0.5–1.4)
EOSINOPHIL # BLD AUTO: 0.2 K/UL (ref 0–0.51)
EOSINOPHIL NFR BLD: 6.5 % (ref 0–6.9)
ERYTHROCYTE [DISTWIDTH] IN BLOOD BY AUTOMATED COUNT: 47.6 FL (ref 35.9–50)
GGT SERPL-CCNC: 7 U/L (ref 7–34)
GLOBULIN SER CALC-MCNC: 2.3 G/DL (ref 1.9–3.5)
GLUCOSE SERPL-MCNC: 94 MG/DL (ref 65–99)
HCT VFR BLD AUTO: 37.6 % (ref 37–47)
HGB BLD-MCNC: 11.9 G/DL (ref 12–16)
IMM GRANULOCYTES # BLD AUTO: 0.01 K/UL (ref 0–0.11)
IMM GRANULOCYTES NFR BLD AUTO: 0.3 % (ref 0–0.9)
LYMPHOCYTES # BLD AUTO: 1.16 K/UL (ref 1–4.8)
LYMPHOCYTES NFR BLD: 37.5 % (ref 22–41)
MAGNESIUM SERPL-MCNC: 2.2 MG/DL (ref 1.5–2.5)
MCH RBC QN AUTO: 29 PG (ref 27–33)
MCHC RBC AUTO-ENTMCNC: 31.6 G/DL (ref 33.6–35)
MCV RBC AUTO: 91.5 FL (ref 81.4–97.8)
MONOCYTES # BLD AUTO: 0.26 K/UL (ref 0–0.85)
MONOCYTES NFR BLD AUTO: 8.4 % (ref 0–13.4)
NEUTROPHILS # BLD AUTO: 1.43 K/UL (ref 2–7.15)
NEUTROPHILS NFR BLD: 46.3 % (ref 44–72)
NRBC # BLD AUTO: 0 K/UL
NRBC BLD-RTO: 0 /100 WBC
PLATELET # BLD AUTO: 80 K/UL (ref 164–446)
PMV BLD AUTO: 9.8 FL (ref 9–12.9)
POTASSIUM SERPL-SCNC: 3.6 MMOL/L (ref 3.6–5.5)
PROT SERPL-MCNC: 6.1 G/DL (ref 6–8.2)
RBC # BLD AUTO: 4.11 M/UL (ref 4.2–5.4)
SODIUM SERPL-SCNC: 142 MMOL/L (ref 135–145)
WBC # BLD AUTO: 3.1 K/UL (ref 4.8–10.8)

## 2018-04-11 PROCEDURE — 80197 ASSAY OF TACROLIMUS: CPT

## 2018-04-11 PROCEDURE — G0010 ADMIN HEPATITIS B VACCINE: HCPCS | Performed by: FAMILY MEDICINE

## 2018-04-11 PROCEDURE — 80053 COMPREHEN METABOLIC PANEL: CPT

## 2018-04-11 PROCEDURE — 90632 HEPA VACCINE ADULT IM: CPT | Performed by: FAMILY MEDICINE

## 2018-04-11 PROCEDURE — 90746 HEPB VACCINE 3 DOSE ADULT IM: CPT | Performed by: FAMILY MEDICINE

## 2018-04-11 PROCEDURE — 83735 ASSAY OF MAGNESIUM: CPT

## 2018-04-11 PROCEDURE — 85025 COMPLETE CBC W/AUTO DIFF WBC: CPT

## 2018-04-11 PROCEDURE — 90472 IMMUNIZATION ADMIN EACH ADD: CPT | Performed by: FAMILY MEDICINE

## 2018-04-11 PROCEDURE — 36415 COLL VENOUS BLD VENIPUNCTURE: CPT

## 2018-04-11 PROCEDURE — 82977 ASSAY OF GGT: CPT

## 2018-04-11 NOTE — PROGRESS NOTES
"Roxana Cuba is a 58 y.o. female here for a non-provider visit for:   HEPATITIS A 1 of 2    Reason for immunization: continue or complete series started at the office  Immunization records indicate need for vaccine: Yes, confirmed with Epic  Minimum interval has been met for this vaccine: Yes  ABN completed: No    Order and dose verified by: NAYE MCFARLAND Dated  07/20/2016 was given to patient: Yes  All IAC Questionnaire questions were answered \"No.\"    Patient tolerated injection and no adverse effects were observed or reported: Yes    Pt scheduled for next dose in series: No    Roxana Cuba is a 58 y.o. female here for a non-provider visit for:   HEPATITIS B 2 of 3    Reason for immunization: continue or complete series started at the office  Immunization records indicate need for vaccine: Yes, confirmed with Epic  Minimum interval has been met for this vaccine: Yes  ABN completed: No    Order and dose verified by: AMI MCFARLAND Dated  07/20/2016 was given to patient: Yes  All IAC Questionnaire questions were answered \"No.\"    Patient tolerated injection and no adverse effects were observed or reported: Yes    Pt scheduled for next dose in series: No      "

## 2018-04-12 ENCOUNTER — HOME CARE VISIT (OUTPATIENT)
Dept: HOME HEALTH SERVICES | Facility: HOME HEALTHCARE | Age: 58
End: 2018-04-12
Payer: MEDICARE

## 2018-04-12 VITALS
OXYGEN SATURATION: 99 % | RESPIRATION RATE: 16 BRPM | TEMPERATURE: 98.1 F | HEART RATE: 68 BPM | DIASTOLIC BLOOD PRESSURE: 75 MMHG | SYSTOLIC BLOOD PRESSURE: 116 MMHG

## 2018-04-12 PROCEDURE — G0495 RN CARE TRAIN/EDU IN HH: HCPCS

## 2018-04-13 LAB — TACROLIMUS BLD-MCNC: 2.5 NG/ML

## 2018-04-14 SDOH — ECONOMIC STABILITY: HOUSING INSECURITY: UNSAFE COOKING RANGE AREA: 0

## 2018-04-14 SDOH — ECONOMIC STABILITY: HOUSING INSECURITY: UNSAFE APPLIANCES: 0

## 2018-04-16 ENCOUNTER — PATIENT MESSAGE (OUTPATIENT)
Dept: PULMONOLOGY | Facility: HOSPICE | Age: 58
End: 2018-04-16

## 2018-04-17 NOTE — TELEPHONE ENCOUNTER
From: Roxana Cuba  To: Gwyn Gaming M.D.  Sent: 4/16/2018 7:16 PM PDT  Subject: Non-Urgent Medical Question    Dr. Gaming,    I want to thank you for the amazing help of Jerilyn and Shakira, regarding my travel and purchase of the Inogen II. I still have one large problem. I have been trying to reach Shakira regarding the process on how to get reimbursement from [facility] for my purchase of the Inogen II. The total out of pocket cost to me was about $3,500.00. I consider that amount to be on the larger side - and would really appreciate any assistance you can give me so that [facility] will pay me back. I have the invoice from [email or web address], the Rx from you, and that's it. Perhaps a letter from you stating that the device is necessary for me would be helpful. Shakira, I know you are busy, but please call me back.  Roxana Cuba  [phone]  [phone]

## 2018-04-18 NOTE — TELEPHONE ENCOUNTER
I called Roxana for Shakira but got her voice mail. Message was left that Shakira is still investigating with [facility] to get the answers for her regarding any reimbursement on her equipment. I know that as SOON as Shakira gets her information she will contact patient via e-mail.

## 2018-04-19 ENCOUNTER — HOME CARE VISIT (OUTPATIENT)
Dept: HOME HEALTH SERVICES | Facility: HOME HEALTHCARE | Age: 58
End: 2018-04-19
Payer: MEDICARE

## 2018-04-19 VITALS
DIASTOLIC BLOOD PRESSURE: 58 MMHG | SYSTOLIC BLOOD PRESSURE: 110 MMHG | OXYGEN SATURATION: 95 % | TEMPERATURE: 98 F | RESPIRATION RATE: 16 BRPM | HEART RATE: 64 BPM

## 2018-04-19 PROCEDURE — G0299 HHS/HOSPICE OF RN EA 15 MIN: HCPCS

## 2018-04-19 SDOH — ECONOMIC STABILITY: HOUSING INSECURITY: UNSAFE APPLIANCES: 0

## 2018-04-19 SDOH — ECONOMIC STABILITY: HOUSING INSECURITY: UNSAFE COOKING RANGE AREA: 0

## 2018-04-19 ASSESSMENT — ENCOUNTER SYMPTOMS
NAUSEA: DENIES
VOMITING: DENIES
SHORTNESS OF BREATH: T

## 2018-04-19 NOTE — TELEPHONE ENCOUNTER
Jerilyn also spoke to patient and advised her to submit what she has and if she is denied we will assist her further

## 2018-04-20 ENCOUNTER — TELEPHONE (OUTPATIENT)
Dept: CARDIOLOGY | Facility: MEDICAL CENTER | Age: 58
End: 2018-04-20

## 2018-04-20 NOTE — TELEPHONE ENCOUNTER
PAR sent to plan:  Roxana Cuba (Hill: [id number]) - [phone]   Status: Sent to Plan on April 20th, 2018  The plan will fax you a determination, typically within 1 to 5 business days.   Drug: Letairis 10MG tablets   Form: [facility] Medicare Prior Authorization Form for Part D Coverage Determination   Phone: [phone]   Fax: [phone]   Claim Rejection Details   Code: [id number]     Status  Sent to [facility]  Next Steps  The plan will fax you a determination, typically within 1 to 5 business days.  How do I follow up?  DrugLetairis 10MG tablets  [employer] Medicare Part D Coverage Determination [facility] Prior Authorization Form for Part D Coverage [phone]  Original Claim Info50

## 2018-04-20 NOTE — TELEPHONE ENCOUNTER
PAR sent to plan:  Roxana Cuba (Hill: [id number]) - [phone]   Status: Sent to Plan on April 20th, 2018  The plan will fax you a determination, typically within 1 to 5 business days.   Drug: Adcirca 20MG tablets   Form: [employer] Medicare Prior Authorization Form for Part D Coverage Determination   Phone: [phone]   Fax: [phone]   Claim Rejection Details   Code: [id number]     Status  Sent to [facility]  Next Steps  The plan will fax you a determination, typically within 1 to 5 business days.  How do I follow up?  DrugAdcirca 20MG tablets  [employer]act Medicare Part D Coverage Determination [facility] Prior Authorization Form for Part D Coverage [phone]  Original Claim [id number]

## 2018-04-25 ENCOUNTER — HOME CARE VISIT (OUTPATIENT)
Dept: HOME HEALTH SERVICES | Facility: HOME HEALTHCARE | Age: 58
End: 2018-04-25
Payer: MEDICARE

## 2018-04-25 VITALS
HEART RATE: 64 BPM | TEMPERATURE: 98 F | OXYGEN SATURATION: 99 % | SYSTOLIC BLOOD PRESSURE: 110 MMHG | RESPIRATION RATE: 16 BRPM | DIASTOLIC BLOOD PRESSURE: 6 MMHG

## 2018-04-25 PROCEDURE — G0162 HHC RN E&M PLAN SVS, 15 MIN: HCPCS

## 2018-04-25 SDOH — ECONOMIC STABILITY: HOUSING INSECURITY: UNSAFE COOKING RANGE AREA: 0

## 2018-04-25 SDOH — ECONOMIC STABILITY: HOUSING INSECURITY: UNSAFE APPLIANCES: 0

## 2018-04-25 ASSESSMENT — ACTIVITIES OF DAILY LIVING (ADL)
HOME_HEALTH_OASIS: 01
OASIS_M1830: 00
HOME_HEALTH_OASIS: 00

## 2018-04-27 ENCOUNTER — APPOINTMENT (OUTPATIENT)
Dept: RADIOLOGY | Facility: MEDICAL CENTER | Age: 58
DRG: 871 | End: 2018-04-27
Attending: EMERGENCY MEDICINE
Payer: MEDICARE

## 2018-04-27 ENCOUNTER — HOSPITAL ENCOUNTER (INPATIENT)
Facility: MEDICAL CENTER | Age: 58
LOS: 3 days | DRG: 871 | End: 2018-04-30
Attending: EMERGENCY MEDICINE | Admitting: INTERNAL MEDICINE
Payer: MEDICARE

## 2018-04-27 DIAGNOSIS — I95.9 HYPOTENSIVE EPISODE: ICD-10-CM

## 2018-04-27 DIAGNOSIS — E86.0 DEHYDRATION: ICD-10-CM

## 2018-04-27 DIAGNOSIS — E27.2 ADRENAL CRISIS (HCC): ICD-10-CM

## 2018-04-27 LAB
ALBUMIN SERPL BCP-MCNC: 4 G/DL (ref 3.2–4.9)
ALBUMIN/GLOB SERPL: 1.9 G/DL
ALP SERPL-CCNC: 24 U/L (ref 30–99)
ALT SERPL-CCNC: 13 U/L (ref 2–50)
ANION GAP SERPL CALC-SCNC: 9 MMOL/L (ref 0–11.9)
APPEARANCE UR: CLEAR
APTT PPP: 27.8 SEC (ref 24.7–36)
AST SERPL-CCNC: 19 U/L (ref 12–45)
BACTERIA #/AREA URNS HPF: NEGATIVE /HPF
BASOPHILS # BLD AUTO: 0.7 % (ref 0–1.8)
BASOPHILS # BLD: 0.02 K/UL (ref 0–0.12)
BILIRUB SERPL-MCNC: 0.3 MG/DL (ref 0.1–1.5)
BILIRUB UR QL STRIP.AUTO: NEGATIVE
BNP SERPL-MCNC: 74 PG/ML (ref 0–100)
BUN SERPL-MCNC: 14 MG/DL (ref 8–22)
CALCIUM SERPL-MCNC: 8.7 MG/DL (ref 8.5–10.5)
CHLORIDE SERPL-SCNC: 104 MMOL/L (ref 96–112)
CO2 SERPL-SCNC: 28 MMOL/L (ref 20–33)
COLOR UR: YELLOW
CORTIS SERPL-MCNC: 1.3 UG/DL (ref 0–23)
CREAT SERPL-MCNC: 1.27 MG/DL (ref 0.5–1.4)
EOSINOPHIL # BLD AUTO: 0.06 K/UL (ref 0–0.51)
EOSINOPHIL NFR BLD: 2 % (ref 0–6.9)
EPI CELLS #/AREA URNS HPF: NEGATIVE /HPF
ERYTHROCYTE [DISTWIDTH] IN BLOOD BY AUTOMATED COUNT: 45.5 FL (ref 35.9–50)
GLOBULIN SER CALC-MCNC: 2.1 G/DL (ref 1.9–3.5)
GLUCOSE SERPL-MCNC: 107 MG/DL (ref 65–99)
GLUCOSE UR STRIP.AUTO-MCNC: NEGATIVE MG/DL
HCT VFR BLD AUTO: 35.1 % (ref 37–47)
HGB BLD-MCNC: 11.1 G/DL (ref 12–16)
HYALINE CASTS #/AREA URNS LPF: ABNORMAL /LPF
IMM GRANULOCYTES # BLD AUTO: 0.01 K/UL (ref 0–0.11)
IMM GRANULOCYTES NFR BLD AUTO: 0.3 % (ref 0–0.9)
INR PPP: 1 (ref 0.87–1.13)
KETONES UR STRIP.AUTO-MCNC: NEGATIVE MG/DL
LACTATE BLD-SCNC: 1.2 MMOL/L (ref 0.5–2)
LACTATE BLD-SCNC: 1.4 MMOL/L (ref 0.5–2)
LEUKOCYTE ESTERASE UR QL STRIP.AUTO: ABNORMAL
LYMPHOCYTES # BLD AUTO: 0.71 K/UL (ref 1–4.8)
LYMPHOCYTES NFR BLD: 24.2 % (ref 22–41)
MCH RBC QN AUTO: 28.8 PG (ref 27–33)
MCHC RBC AUTO-ENTMCNC: 31.6 G/DL (ref 33.6–35)
MCV RBC AUTO: 91.2 FL (ref 81.4–97.8)
MICRO URNS: ABNORMAL
MONOCYTES # BLD AUTO: 0.2 K/UL (ref 0–0.85)
MONOCYTES NFR BLD AUTO: 6.8 % (ref 0–13.4)
NEUTROPHILS # BLD AUTO: 1.93 K/UL (ref 2–7.15)
NEUTROPHILS NFR BLD: 66 % (ref 44–72)
NITRITE UR QL STRIP.AUTO: NEGATIVE
NRBC # BLD AUTO: 0 K/UL
NRBC BLD-RTO: 0 /100 WBC
PH UR STRIP.AUTO: 5.5 [PH]
PLATELET # BLD AUTO: 76 K/UL (ref 164–446)
PMV BLD AUTO: 9.7 FL (ref 9–12.9)
POTASSIUM SERPL-SCNC: 3.9 MMOL/L (ref 3.6–5.5)
PROT SERPL-MCNC: 6.1 G/DL (ref 6–8.2)
PROT UR QL STRIP: NEGATIVE MG/DL
PROTHROMBIN TIME: 12.9 SEC (ref 12–14.6)
RBC # BLD AUTO: 3.85 M/UL (ref 4.2–5.4)
RBC # URNS HPF: ABNORMAL /HPF
RBC UR QL AUTO: NEGATIVE
SODIUM SERPL-SCNC: 141 MMOL/L (ref 135–145)
SP GR UR STRIP.AUTO: 1.01
T4 FREE SERPL-MCNC: 1.12 NG/DL (ref 0.53–1.43)
TROPONIN I SERPL-MCNC: <0.01 NG/ML (ref 0–0.04)
TSH SERPL DL<=0.005 MIU/L-ACNC: 0.23 UIU/ML (ref 0.38–5.33)
UROBILINOGEN UR STRIP.AUTO-MCNC: 0.2 MG/DL
WBC # BLD AUTO: 2.9 K/UL (ref 4.8–10.8)
WBC #/AREA URNS HPF: ABNORMAL /HPF

## 2018-04-27 PROCEDURE — 84443 ASSAY THYROID STIM HORMONE: CPT

## 2018-04-27 PROCEDURE — 93005 ELECTROCARDIOGRAM TRACING: CPT | Performed by: EMERGENCY MEDICINE

## 2018-04-27 PROCEDURE — 700111 HCHG RX REV CODE 636 W/ 250 OVERRIDE (IP): Performed by: INTERNAL MEDICINE

## 2018-04-27 PROCEDURE — 96375 TX/PRO/DX INJ NEW DRUG ADDON: CPT

## 2018-04-27 PROCEDURE — 99223 1ST HOSP IP/OBS HIGH 75: CPT | Mod: AI | Performed by: INTERNAL MEDICINE

## 2018-04-27 PROCEDURE — 85610 PROTHROMBIN TIME: CPT

## 2018-04-27 PROCEDURE — 700112 HCHG RX REV CODE 229: Performed by: INTERNAL MEDICINE

## 2018-04-27 PROCEDURE — 86644 CMV ANTIBODY: CPT

## 2018-04-27 PROCEDURE — 700105 HCHG RX REV CODE 258: Performed by: EMERGENCY MEDICINE

## 2018-04-27 PROCEDURE — 87040 BLOOD CULTURE FOR BACTERIA: CPT | Mod: 91

## 2018-04-27 PROCEDURE — 84439 ASSAY OF FREE THYROXINE: CPT

## 2018-04-27 PROCEDURE — 99291 CRITICAL CARE FIRST HOUR: CPT

## 2018-04-27 PROCEDURE — 700111 HCHG RX REV CODE 636 W/ 250 OVERRIDE (IP): Performed by: EMERGENCY MEDICINE

## 2018-04-27 PROCEDURE — A9270 NON-COVERED ITEM OR SERVICE: HCPCS | Performed by: INTERNAL MEDICINE

## 2018-04-27 PROCEDURE — 81001 URINALYSIS AUTO W/SCOPE: CPT

## 2018-04-27 PROCEDURE — 770022 HCHG ROOM/CARE - ICU (200)

## 2018-04-27 PROCEDURE — 96374 THER/PROPH/DIAG INJ IV PUSH: CPT

## 2018-04-27 PROCEDURE — 82533 TOTAL CORTISOL: CPT

## 2018-04-27 PROCEDURE — 86645 CMV ANTIBODY IGM: CPT

## 2018-04-27 PROCEDURE — 83605 ASSAY OF LACTIC ACID: CPT | Mod: 91

## 2018-04-27 PROCEDURE — 71045 X-RAY EXAM CHEST 1 VIEW: CPT

## 2018-04-27 PROCEDURE — 700102 HCHG RX REV CODE 250 W/ 637 OVERRIDE(OP): Performed by: INTERNAL MEDICINE

## 2018-04-27 PROCEDURE — 80053 COMPREHEN METABOLIC PANEL: CPT

## 2018-04-27 PROCEDURE — 700105 HCHG RX REV CODE 258: Performed by: INTERNAL MEDICINE

## 2018-04-27 PROCEDURE — 304561 HCHG STAT O2

## 2018-04-27 PROCEDURE — 84484 ASSAY OF TROPONIN QUANT: CPT

## 2018-04-27 PROCEDURE — 87086 URINE CULTURE/COLONY COUNT: CPT

## 2018-04-27 PROCEDURE — 85730 THROMBOPLASTIN TIME PARTIAL: CPT

## 2018-04-27 PROCEDURE — 83880 ASSAY OF NATRIURETIC PEPTIDE: CPT

## 2018-04-27 PROCEDURE — 85025 COMPLETE CBC W/AUTO DIFF WBC: CPT

## 2018-04-27 RX ORDER — SODIUM CHLORIDE 9 MG/ML
INJECTION, SOLUTION INTRAVENOUS CONTINUOUS
Status: DISPENSED | OUTPATIENT
Start: 2018-04-27 | End: 2018-04-28

## 2018-04-27 RX ORDER — TRAZODONE HYDROCHLORIDE 100 MG/1
100 TABLET ORAL NIGHTLY PRN
COMMUNITY
End: 2018-08-28 | Stop reason: SDUPTHER

## 2018-04-27 RX ORDER — SODIUM CHLORIDE 9 MG/ML
30 INJECTION, SOLUTION INTRAVENOUS ONCE
Status: COMPLETED | OUTPATIENT
Start: 2018-04-27 | End: 2018-04-27

## 2018-04-27 RX ORDER — MORPHINE SULFATE 10 MG/5ML
20 SOLUTION ORAL EVERY 6 HOURS
Status: DISCONTINUED | OUTPATIENT
Start: 2018-04-28 | End: 2018-04-30 | Stop reason: HOSPADM

## 2018-04-27 RX ORDER — WHEAT DEXTRIN 3 G/3.8 G
1 POWDER (GRAM) ORAL DAILY
Status: DISCONTINUED | OUTPATIENT
Start: 2018-04-28 | End: 2018-04-27

## 2018-04-27 RX ORDER — MORPHINE SULFATE 4 MG/ML
4 INJECTION, SOLUTION INTRAMUSCULAR; INTRAVENOUS EVERY 4 HOURS PRN
Status: DISCONTINUED | OUTPATIENT
Start: 2018-04-27 | End: 2018-04-30 | Stop reason: HOSPADM

## 2018-04-27 RX ORDER — PREDNISONE 1 MG/1
2 TABLET ORAL EVERY MORNING
Status: ON HOLD | COMMUNITY
End: 2021-08-03

## 2018-04-27 RX ORDER — TRAZODONE HYDROCHLORIDE 50 MG/1
100 TABLET ORAL NIGHTLY
Status: DISCONTINUED | OUTPATIENT
Start: 2018-04-27 | End: 2018-04-30 | Stop reason: HOSPADM

## 2018-04-27 RX ORDER — TIOTROPIUM BROMIDE 18 UG/1
1 CAPSULE ORAL; RESPIRATORY (INHALATION)
Status: DISCONTINUED | OUTPATIENT
Start: 2018-04-28 | End: 2018-04-28

## 2018-04-27 RX ORDER — PRAVASTATIN SODIUM 20 MG
20 TABLET ORAL
Status: DISCONTINUED | OUTPATIENT
Start: 2018-04-28 | End: 2018-04-30 | Stop reason: HOSPADM

## 2018-04-27 RX ORDER — MYCOPHENOLATE MOFETIL 250 MG/1
500 CAPSULE ORAL 2 TIMES DAILY
Status: DISCONTINUED | OUTPATIENT
Start: 2018-04-27 | End: 2018-04-30 | Stop reason: HOSPADM

## 2018-04-27 RX ORDER — DEXAMETHASONE SODIUM PHOSPHATE 4 MG/ML
10 INJECTION, SOLUTION INTRA-ARTICULAR; INTRALESIONAL; INTRAMUSCULAR; INTRAVENOUS; SOFT TISSUE ONCE
Status: COMPLETED | OUTPATIENT
Start: 2018-04-27 | End: 2018-04-27

## 2018-04-27 RX ORDER — TADALAFIL 20 MG/1
40 TABLET ORAL
Status: DISCONTINUED | OUTPATIENT
Start: 2018-04-27 | End: 2018-04-30 | Stop reason: HOSPADM

## 2018-04-27 RX ORDER — LEVOTHYROXINE SODIUM 137 UG/1
137 TABLET ORAL
Status: DISCONTINUED | OUTPATIENT
Start: 2018-04-28 | End: 2018-04-30 | Stop reason: HOSPADM

## 2018-04-27 RX ORDER — SODIUM CHLORIDE 9 MG/ML
30 INJECTION, SOLUTION INTRAVENOUS
Status: DISCONTINUED | OUTPATIENT
Start: 2018-04-27 | End: 2018-04-30 | Stop reason: HOSPADM

## 2018-04-27 RX ORDER — DOCUSATE SODIUM 100 MG/1
100 CAPSULE, LIQUID FILLED ORAL 2 TIMES DAILY
Status: DISCONTINUED | OUTPATIENT
Start: 2018-04-27 | End: 2018-04-30 | Stop reason: HOSPADM

## 2018-04-27 RX ORDER — OMEPRAZOLE 20 MG/1
40 CAPSULE, DELAYED RELEASE ORAL DAILY
Status: DISCONTINUED | OUTPATIENT
Start: 2018-04-28 | End: 2018-04-30 | Stop reason: HOSPADM

## 2018-04-27 RX ORDER — MORPHINE SULFATE 4 MG/ML
1 INJECTION, SOLUTION INTRAMUSCULAR; INTRAVENOUS ONCE
Status: COMPLETED | OUTPATIENT
Start: 2018-04-27 | End: 2018-04-27

## 2018-04-27 RX ORDER — ONDANSETRON 2 MG/ML
4 INJECTION INTRAMUSCULAR; INTRAVENOUS EVERY 4 HOURS PRN
Status: DISCONTINUED | OUTPATIENT
Start: 2018-04-27 | End: 2018-04-30 | Stop reason: HOSPADM

## 2018-04-27 RX ORDER — LACTULOSE 10 G/15ML
20 SOLUTION ORAL DAILY
COMMUNITY
End: 2018-05-04

## 2018-04-27 RX ORDER — GABAPENTIN 300 MG/1
600 CAPSULE ORAL 3 TIMES DAILY
Status: DISCONTINUED | OUTPATIENT
Start: 2018-04-27 | End: 2018-04-30 | Stop reason: HOSPADM

## 2018-04-27 RX ORDER — ALPRAZOLAM 1 MG/1
1 TABLET ORAL 3 TIMES DAILY
Status: DISCONTINUED | OUTPATIENT
Start: 2018-04-27 | End: 2018-04-30 | Stop reason: HOSPADM

## 2018-04-27 RX ORDER — AMBRISENTAN 10 MG/1
10 TABLET, FILM COATED ORAL DAILY
Status: DISCONTINUED | OUTPATIENT
Start: 2018-04-28 | End: 2018-04-30 | Stop reason: HOSPADM

## 2018-04-27 RX ORDER — SODIUM CHLORIDE 9 MG/ML
500 INJECTION, SOLUTION INTRAVENOUS
Status: COMPLETED | OUTPATIENT
Start: 2018-04-27 | End: 2018-04-28

## 2018-04-27 RX ORDER — MORPHINE SULFATE 10 MG/5ML
15-20 SOLUTION ORAL EVERY 6 HOURS
Status: DISCONTINUED | OUTPATIENT
Start: 2018-04-28 | End: 2018-04-27

## 2018-04-27 RX ORDER — ASCORBIC ACID 500 MG
500 TABLET ORAL DAILY
Status: DISCONTINUED | OUTPATIENT
Start: 2018-04-28 | End: 2018-04-30 | Stop reason: HOSPADM

## 2018-04-27 RX ORDER — ONDANSETRON 2 MG/ML
4 INJECTION INTRAMUSCULAR; INTRAVENOUS ONCE
Status: COMPLETED | OUTPATIENT
Start: 2018-04-27 | End: 2018-04-27

## 2018-04-27 RX ORDER — OXYMETAZOLINE HYDROCHLORIDE 0.05 G/100ML
2 SPRAY NASAL
Status: DISCONTINUED | OUTPATIENT
Start: 2018-04-27 | End: 2018-04-30 | Stop reason: HOSPADM

## 2018-04-27 RX ORDER — POLYETHYLENE GLYCOL 3350 17 G/17G
17 POWDER, FOR SOLUTION ORAL DAILY
Status: DISCONTINUED | OUTPATIENT
Start: 2018-04-27 | End: 2018-04-27

## 2018-04-27 RX ORDER — MORPHINE SULFATE 10 MG/5ML
15-20 SOLUTION ORAL EVERY 6 HOURS
COMMUNITY
End: 2018-09-28

## 2018-04-27 RX ORDER — DOCUSATE SODIUM 100 MG/1
100 CAPSULE, LIQUID FILLED ORAL SEE ADMIN INSTRUCTIONS
Status: ON HOLD | COMMUNITY
End: 2021-08-03

## 2018-04-27 RX ORDER — ONDANSETRON 4 MG/1
4 TABLET, ORALLY DISINTEGRATING ORAL EVERY 8 HOURS PRN
Status: DISCONTINUED | OUTPATIENT
Start: 2018-04-27 | End: 2018-04-27

## 2018-04-27 RX ORDER — ONDANSETRON 4 MG/1
4 TABLET, ORALLY DISINTEGRATING ORAL EVERY 4 HOURS PRN
Status: DISCONTINUED | OUTPATIENT
Start: 2018-04-27 | End: 2018-04-30 | Stop reason: HOSPADM

## 2018-04-27 RX ORDER — POLYETHYLENE GLYCOL 3350 17 G/17G
1 POWDER, FOR SOLUTION ORAL DAILY
Status: DISCONTINUED | OUTPATIENT
Start: 2018-04-28 | End: 2018-04-30 | Stop reason: HOSPADM

## 2018-04-27 RX ORDER — DEXAMETHASONE SODIUM PHOSPHATE 10 MG/ML
10 INJECTION, SOLUTION INTRAMUSCULAR; INTRAVENOUS ONCE
Status: DISCONTINUED | OUTPATIENT
Start: 2018-04-27 | End: 2018-04-27

## 2018-04-27 RX ORDER — MORPHINE SULFATE 10 MG/5ML
15 SOLUTION ORAL EVERY 6 HOURS
Status: DISCONTINUED | OUTPATIENT
Start: 2018-04-28 | End: 2018-04-30 | Stop reason: HOSPADM

## 2018-04-27 RX ORDER — AMBRISENTAN 10 MG/1
10 TABLET, FILM COATED ORAL DAILY
Status: DISCONTINUED | OUTPATIENT
Start: 2018-04-28 | End: 2018-04-27

## 2018-04-27 RX ADMIN — ALPRAZOLAM 1 MG: 0.25 TABLET ORAL at 20:27

## 2018-04-27 RX ADMIN — TRAZODONE HYDROCHLORIDE 100 MG: 50 TABLET ORAL at 23:21

## 2018-04-27 RX ADMIN — TADALAFIL 40 MG: 20 TABLET ORAL at 20:29

## 2018-04-27 RX ADMIN — HYDROCORTISONE SODIUM SUCCINATE 100 MG: 100 INJECTION, POWDER, FOR SOLUTION INTRAMUSCULAR; INTRAVENOUS at 22:17

## 2018-04-27 RX ADMIN — GABAPENTIN 600 MG: 300 CAPSULE ORAL at 20:26

## 2018-04-27 RX ADMIN — SODIUM CHLORIDE 2262 ML: 9 INJECTION, SOLUTION INTRAVENOUS at 16:46

## 2018-04-27 RX ADMIN — MORPHINE SULFATE 1 MG: 4 INJECTION INTRAVENOUS at 17:30

## 2018-04-27 RX ADMIN — DEXAMETHASONE SODIUM PHOSPHATE 10 MG: 4 INJECTION, SOLUTION INTRAMUSCULAR; INTRAVENOUS at 17:30

## 2018-04-27 RX ADMIN — PIPERACILLIN AND TAZOBACTAM 4.5 G: 4; .5 INJECTION, POWDER, LYOPHILIZED, FOR SOLUTION INTRAVENOUS; PARENTERAL at 22:16

## 2018-04-27 RX ADMIN — PIPERACILLIN AND TAZOBACTAM 4.5 G: 4; .5 INJECTION, POWDER, LYOPHILIZED, FOR SOLUTION INTRAVENOUS; PARENTERAL at 23:41

## 2018-04-27 RX ADMIN — TACROLIMUS 1.5 MG: 1 CAPSULE ORAL at 20:25

## 2018-04-27 RX ADMIN — MORPHINE SULFATE 4 MG: 4 INJECTION INTRAVENOUS at 22:25

## 2018-04-27 RX ADMIN — SERTRALINE 100 MG: 100 TABLET, FILM COATED ORAL at 20:26

## 2018-04-27 RX ADMIN — ONDANSETRON HYDROCHLORIDE 4 MG: 2 INJECTION, SOLUTION INTRAMUSCULAR; INTRAVENOUS at 17:30

## 2018-04-27 RX ADMIN — MYCOPHENOLATE MOFETIL 500 MG: 250 CAPSULE ORAL at 20:24

## 2018-04-27 RX ADMIN — SODIUM CHLORIDE: 9 INJECTION, SOLUTION INTRAVENOUS at 22:17

## 2018-04-27 RX ADMIN — VANCOMYCIN HYDROCHLORIDE 1900 MG: 100 INJECTION, POWDER, LYOPHILIZED, FOR SOLUTION INTRAVENOUS at 23:35

## 2018-04-27 RX ADMIN — DOCUSATE SODIUM 100 MG: 100 CAPSULE ORAL at 20:26

## 2018-04-27 ASSESSMENT — ENCOUNTER SYMPTOMS
PALPITATIONS: 0
FOCAL WEAKNESS: 0
WEIGHT LOSS: 0
CHILLS: 0
SPUTUM PRODUCTION: 0
DEPRESSION: 0
ABDOMINAL PAIN: 0
DIARRHEA: 1
DIZZINESS: 0
HEADACHES: 0
SHORTNESS OF BREATH: 1
MYALGIAS: 0
HEARTBURN: 0
NAUSEA: 1
LOSS OF CONSCIOUSNESS: 0
HEMOPTYSIS: 0
VOMITING: 1
BLURRED VISION: 0
WEAKNESS: 1
COUGH: 0
FEVER: 0

## 2018-04-27 ASSESSMENT — PAIN SCALES - GENERAL
PAINLEVEL_OUTOF10: 4
PAINLEVEL_OUTOF10: 4
PAINLEVEL_OUTOF10: 9
PAINLEVEL_OUTOF10: 8

## 2018-04-27 ASSESSMENT — PATIENT HEALTH QUESTIONNAIRE - PHQ9
2. FEELING DOWN, DEPRESSED, IRRITABLE, OR HOPELESS: NOT AT ALL
1. LITTLE INTEREST OR PLEASURE IN DOING THINGS: NOT AT ALL
SUM OF ALL RESPONSES TO PHQ9 QUESTIONS 1 AND 2: 0

## 2018-04-27 ASSESSMENT — LIFESTYLE VARIABLES: EVER_SMOKED: NEVER

## 2018-04-27 NOTE — ED TRIAGE NOTES
Pt presents to the ED with complaint of pain on her left side just under her left rib cage. Pt received 100mcg fentanyl IV and 4mg zofran IV PTA.

## 2018-04-28 ENCOUNTER — APPOINTMENT (OUTPATIENT)
Dept: RADIOLOGY | Facility: MEDICAL CENTER | Age: 58
DRG: 871 | End: 2018-04-28
Attending: INTERNAL MEDICINE
Payer: MEDICARE

## 2018-04-28 PROBLEM — F39 MOOD DISORDER (HCC): Status: RESOLVED | Noted: 2017-03-22 | Resolved: 2018-04-28

## 2018-04-28 LAB
ALBUMIN SERPL BCP-MCNC: 3.8 G/DL (ref 3.2–4.9)
ALBUMIN/GLOB SERPL: 1.7 G/DL
ALP SERPL-CCNC: 34 U/L (ref 30–99)
ALT SERPL-CCNC: 13 U/L (ref 2–50)
ANION GAP SERPL CALC-SCNC: 8 MMOL/L (ref 0–11.9)
AST SERPL-CCNC: 17 U/L (ref 12–45)
BASOPHILS # BLD AUTO: 0.3 % (ref 0–1.8)
BASOPHILS # BLD: 0.01 K/UL (ref 0–0.12)
BILIRUB SERPL-MCNC: 0.3 MG/DL (ref 0.1–1.5)
BUN SERPL-MCNC: 16 MG/DL (ref 8–22)
CALCIUM SERPL-MCNC: 7.7 MG/DL (ref 8.5–10.5)
CHLORIDE SERPL-SCNC: 108 MMOL/L (ref 96–112)
CO2 SERPL-SCNC: 23 MMOL/L (ref 20–33)
COMMENT 1642: NORMAL
CREAT SERPL-MCNC: 1.19 MG/DL (ref 0.5–1.4)
EOSINOPHIL # BLD AUTO: 0 K/UL (ref 0–0.51)
EOSINOPHIL NFR BLD: 0 % (ref 0–6.9)
ERYTHROCYTE [DISTWIDTH] IN BLOOD BY AUTOMATED COUNT: 46 FL (ref 35.9–50)
FLUAV RNA SPEC QL NAA+PROBE: NEGATIVE
FLUBV RNA SPEC QL NAA+PROBE: NEGATIVE
GLOBULIN SER CALC-MCNC: 2.2 G/DL (ref 1.9–3.5)
GLUCOSE SERPL-MCNC: 333 MG/DL (ref 65–99)
HCT VFR BLD AUTO: 40.4 % (ref 37–47)
HGB BLD-MCNC: 12.5 G/DL (ref 12–16)
IMM GRANULOCYTES # BLD AUTO: 0.01 K/UL (ref 0–0.11)
IMM GRANULOCYTES NFR BLD AUTO: 0.3 % (ref 0–0.9)
LYMPHOCYTES # BLD AUTO: 0.33 K/UL (ref 1–4.8)
LYMPHOCYTES NFR BLD: 11 % (ref 22–41)
MAGNESIUM SERPL-MCNC: 1.8 MG/DL (ref 1.5–2.5)
MCH RBC QN AUTO: 28.5 PG (ref 27–33)
MCHC RBC AUTO-ENTMCNC: 30.9 G/DL (ref 33.6–35)
MCV RBC AUTO: 92 FL (ref 81.4–97.8)
MONOCYTES # BLD AUTO: 0.04 K/UL (ref 0–0.85)
MONOCYTES NFR BLD AUTO: 1.3 % (ref 0–13.4)
MORPHOLOGY BLD-IMP: NORMAL
NEUTROPHILS # BLD AUTO: 2.62 K/UL (ref 2–7.15)
NEUTROPHILS NFR BLD: 87.1 % (ref 44–72)
NRBC # BLD AUTO: 0 K/UL
NRBC BLD-RTO: 0 /100 WBC
PLATELET # BLD AUTO: 59 K/UL (ref 164–446)
PMV BLD AUTO: 9.8 FL (ref 9–12.9)
POTASSIUM SERPL-SCNC: 4.5 MMOL/L (ref 3.6–5.5)
PROT SERPL-MCNC: 6 G/DL (ref 6–8.2)
QORDR QORDR: NORMAL
RBC # BLD AUTO: 4.39 M/UL (ref 4.2–5.4)
SODIUM SERPL-SCNC: 139 MMOL/L (ref 135–145)
WBC # BLD AUTO: 3 K/UL (ref 4.8–10.8)

## 2018-04-28 PROCEDURE — A9270 NON-COVERED ITEM OR SERVICE: HCPCS | Performed by: INTERNAL MEDICINE

## 2018-04-28 PROCEDURE — 700111 HCHG RX REV CODE 636 W/ 250 OVERRIDE (IP): Performed by: HOSPITALIST

## 2018-04-28 PROCEDURE — 700102 HCHG RX REV CODE 250 W/ 637 OVERRIDE(OP): Performed by: INTERNAL MEDICINE

## 2018-04-28 PROCEDURE — 700105 HCHG RX REV CODE 258: Performed by: INTERNAL MEDICINE

## 2018-04-28 PROCEDURE — 80053 COMPREHEN METABOLIC PANEL: CPT

## 2018-04-28 PROCEDURE — 85025 COMPLETE CBC W/AUTO DIFF WBC: CPT

## 2018-04-28 PROCEDURE — 86665 EPSTEIN-BARR CAPSID VCA: CPT

## 2018-04-28 PROCEDURE — 770022 HCHG ROOM/CARE - ICU (200)

## 2018-04-28 PROCEDURE — 700102 HCHG RX REV CODE 250 W/ 637 OVERRIDE(OP): Performed by: HOSPITALIST

## 2018-04-28 PROCEDURE — A9270 NON-COVERED ITEM OR SERVICE: HCPCS | Performed by: HOSPITALIST

## 2018-04-28 PROCEDURE — 99233 SBSQ HOSP IP/OBS HIGH 50: CPT | Performed by: HOSPITALIST

## 2018-04-28 PROCEDURE — 700111 HCHG RX REV CODE 636 W/ 250 OVERRIDE (IP): Performed by: INTERNAL MEDICINE

## 2018-04-28 PROCEDURE — 700112 HCHG RX REV CODE 229: Performed by: INTERNAL MEDICINE

## 2018-04-28 PROCEDURE — 83735 ASSAY OF MAGNESIUM: CPT

## 2018-04-28 PROCEDURE — 86663 EPSTEIN-BARR ANTIBODY: CPT

## 2018-04-28 PROCEDURE — 76700 US EXAM ABDOM COMPLETE: CPT

## 2018-04-28 PROCEDURE — 87502 INFLUENZA DNA AMP PROBE: CPT

## 2018-04-28 PROCEDURE — 86664 EPSTEIN-BARR NUCLEAR ANTIGEN: CPT

## 2018-04-28 RX ORDER — TIOTROPIUM BROMIDE 18 UG/1
1 CAPSULE ORAL; RESPIRATORY (INHALATION) DAILY
Status: DISCONTINUED | OUTPATIENT
Start: 2018-04-28 | End: 2018-04-30 | Stop reason: HOSPADM

## 2018-04-28 RX ORDER — MIDODRINE HYDROCHLORIDE 5 MG/1
5 TABLET ORAL
Status: DISCONTINUED | OUTPATIENT
Start: 2018-04-28 | End: 2018-04-30 | Stop reason: HOSPADM

## 2018-04-28 RX ADMIN — LEVOTHYROXINE SODIUM 137 MCG: 137 TABLET ORAL at 08:44

## 2018-04-28 RX ADMIN — CALCIUM CARBONATE-CHOLECALCIFEROL TAB 250 MG-125 UNIT 1 TABLET: 250-125 TAB at 08:45

## 2018-04-28 RX ADMIN — AMBRISENTAN 10 MG: 10 TABLET, FILM COATED ORAL at 09:00

## 2018-04-28 RX ADMIN — PIPERACILLIN AND TAZOBACTAM 4.5 G: 4; .5 INJECTION, POWDER, LYOPHILIZED, FOR SOLUTION INTRAVENOUS; PARENTERAL at 04:34

## 2018-04-28 RX ADMIN — TACROLIMUS 1.5 MG: 1 CAPSULE ORAL at 20:07

## 2018-04-28 RX ADMIN — PIPERACILLIN AND TAZOBACTAM 4.5 G: 4; .5 INJECTION, POWDER, LYOPHILIZED, FOR SOLUTION INTRAVENOUS; PARENTERAL at 20:04

## 2018-04-28 RX ADMIN — GABAPENTIN 600 MG: 300 CAPSULE ORAL at 08:44

## 2018-04-28 RX ADMIN — TRAZODONE HYDROCHLORIDE 100 MG: 50 TABLET ORAL at 20:12

## 2018-04-28 RX ADMIN — ALPRAZOLAM 1 MG: 0.25 TABLET ORAL at 04:31

## 2018-04-28 RX ADMIN — ONDANSETRON 4 MG: 2 INJECTION, SOLUTION INTRAMUSCULAR; INTRAVENOUS at 17:09

## 2018-04-28 RX ADMIN — DOCUSATE SODIUM 100 MG: 100 CAPSULE ORAL at 20:01

## 2018-04-28 RX ADMIN — TACROLIMUS 1.5 MG: 1 CAPSULE ORAL at 08:49

## 2018-04-28 RX ADMIN — SODIUM CHLORIDE: 9 INJECTION, SOLUTION INTRAVENOUS at 08:55

## 2018-04-28 RX ADMIN — MORPHINE SULFATE 4 MG: 4 INJECTION INTRAVENOUS at 21:53

## 2018-04-28 RX ADMIN — ALPRAZOLAM 1 MG: 0.25 TABLET ORAL at 13:02

## 2018-04-28 RX ADMIN — HYDROCORTISONE SODIUM SUCCINATE 100 MG: 100 INJECTION, POWDER, FOR SOLUTION INTRAMUSCULAR; INTRAVENOUS at 14:22

## 2018-04-28 RX ADMIN — TADALAFIL 40 MG: 20 TABLET ORAL at 21:00

## 2018-04-28 RX ADMIN — MORPHINE SULFATE 4 MG: 4 INJECTION INTRAVENOUS at 04:07

## 2018-04-28 RX ADMIN — OXYMETAZOLINE HYDROCHLORIDE 2 SPRAY: 5 SPRAY NASAL at 20:05

## 2018-04-28 RX ADMIN — MYCOPHENOLATE MOFETIL 500 MG: 250 CAPSULE ORAL at 20:04

## 2018-04-28 RX ADMIN — MORPHINE SULFATE 4 MG: 4 INJECTION INTRAVENOUS at 17:09

## 2018-04-28 RX ADMIN — HYDROCORTISONE SODIUM SUCCINATE 100 MG: 100 INJECTION, POWDER, FOR SOLUTION INTRAMUSCULAR; INTRAVENOUS at 20:01

## 2018-04-28 RX ADMIN — PIPERACILLIN AND TAZOBACTAM 4.5 G: 4; .5 INJECTION, POWDER, LYOPHILIZED, FOR SOLUTION INTRAVENOUS; PARENTERAL at 13:14

## 2018-04-28 RX ADMIN — ONDANSETRON 4 MG: 2 INJECTION, SOLUTION INTRAMUSCULAR; INTRAVENOUS at 08:41

## 2018-04-28 RX ADMIN — CALCIUM CARBONATE-CHOLECALCIFEROL TAB 250 MG-125 UNIT 1 TABLET: 250-125 TAB at 16:43

## 2018-04-28 RX ADMIN — PRAVASTATIN SODIUM 20 MG: 20 TABLET ORAL at 20:05

## 2018-04-28 RX ADMIN — HYDROCORTISONE SODIUM SUCCINATE 100 MG: 100 INJECTION, POWDER, FOR SOLUTION INTRAMUSCULAR; INTRAVENOUS at 04:31

## 2018-04-28 RX ADMIN — ONDANSETRON 4 MG: 2 INJECTION, SOLUTION INTRAMUSCULAR; INTRAVENOUS at 13:02

## 2018-04-28 RX ADMIN — SODIUM CHLORIDE 500 ML: 9 INJECTION, SOLUTION INTRAVENOUS at 00:39

## 2018-04-28 RX ADMIN — MYCOPHENOLATE MOFETIL 500 MG: 250 CAPSULE ORAL at 08:50

## 2018-04-28 RX ADMIN — OXYMETAZOLINE HYDROCHLORIDE 2 SPRAY: 5 SPRAY NASAL at 00:08

## 2018-04-28 RX ADMIN — MORPHINE SULFATE 4 MG: 4 INJECTION INTRAVENOUS at 08:54

## 2018-04-28 RX ADMIN — OMEPRAZOLE 40 MG: 20 CAPSULE, DELAYED RELEASE ORAL at 08:44

## 2018-04-28 RX ADMIN — OXYCODONE HYDROCHLORIDE AND ACETAMINOPHEN 500 MG: 500 TABLET ORAL at 08:44

## 2018-04-28 RX ADMIN — ASPIRIN 81 MG: 81 TABLET, COATED ORAL at 08:44

## 2018-04-28 RX ADMIN — TIOTROPIUM BROMIDE 1 CAPSULE: 18 CAPSULE ORAL; RESPIRATORY (INHALATION) at 08:46

## 2018-04-28 RX ADMIN — MORPHINE SULFATE 4 MG: 4 INJECTION INTRAVENOUS at 13:02

## 2018-04-28 RX ADMIN — VANCOMYCIN HYDROCHLORIDE 1400 MG: 100 INJECTION, POWDER, LYOPHILIZED, FOR SOLUTION INTRAVENOUS at 20:11

## 2018-04-28 RX ADMIN — VANCOMYCIN HYDROCHLORIDE 1400 MG: 100 INJECTION, POWDER, LYOPHILIZED, FOR SOLUTION INTRAVENOUS at 09:53

## 2018-04-28 RX ADMIN — SERTRALINE 100 MG: 100 TABLET, FILM COATED ORAL at 20:05

## 2018-04-28 RX ADMIN — MIDODRINE HYDROCHLORIDE 5 MG: 5 TABLET ORAL at 16:43

## 2018-04-28 RX ADMIN — GABAPENTIN 600 MG: 300 CAPSULE ORAL at 20:02

## 2018-04-28 RX ADMIN — ALPRAZOLAM 1 MG: 0.25 TABLET ORAL at 20:01

## 2018-04-28 ASSESSMENT — ENCOUNTER SYMPTOMS
COUGH: 0
SENSORY CHANGE: 0
SEIZURES: 0
ORTHOPNEA: 0
TREMORS: 0
NAUSEA: 1
EYE PAIN: 0
STRIDOR: 0
PALPITATIONS: 0
HALLUCINATIONS: 0
BRUISES/BLEEDS EASILY: 0
BLURRED VISION: 0
HEADACHES: 0
LOSS OF CONSCIOUSNESS: 0
MYALGIAS: 0
FEVER: 0
FOCAL WEAKNESS: 0
SPEECH CHANGE: 0
POLYDIPSIA: 0
NECK PAIN: 0
DIAPHORESIS: 0
VOMITING: 1
SPUTUM PRODUCTION: 0
DOUBLE VISION: 0
CHILLS: 0
ABDOMINAL PAIN: 1
PHOTOPHOBIA: 0
HEMOPTYSIS: 0

## 2018-04-28 ASSESSMENT — PAIN SCALES - GENERAL
PAINLEVEL_OUTOF10: 5
PAINLEVEL_OUTOF10: 3
PAINLEVEL_OUTOF10: 8
PAINLEVEL_OUTOF10: 4
PAINLEVEL_OUTOF10: 8
PAINLEVEL_OUTOF10: 3
PAINLEVEL_OUTOF10: 3
PAINLEVEL_OUTOF10: 5
PAINLEVEL_OUTOF10: 8
PAINLEVEL_OUTOF10: 5
PAINLEVEL_OUTOF10: 4
PAINLEVEL_OUTOF10: 6
PAINLEVEL_OUTOF10: 3
PAINLEVEL_OUTOF10: 4

## 2018-04-28 NOTE — PROGRESS NOTES
Pulmonary Critical Care Progress Note      Date of service:  4/28/2018    Chief Complaint:  Abdominal pain    Interval Events:  24 hour interval history reviewed  Reason for visit:   Hypotension, abdominal pain, pulmonary hypertension       - hypotensive   - sepsis bolus given   - SB-SR   - vomited today   - 6 L NC   - vanco and Zosyn day 2      Review of Systems   Constitutional: Negative for chills, diaphoresis and fever.   HENT: Negative for congestion, ear discharge, ear pain and nosebleeds.    Eyes: Negative for blurred vision, double vision, photophobia and pain.   Respiratory: Negative for hemoptysis, sputum production and stridor.    Cardiovascular: Negative for chest pain, palpitations and orthopnea.   Gastrointestinal: Positive for abdominal pain, nausea and vomiting.   Genitourinary: Negative for dysuria, frequency, hematuria and urgency.   Musculoskeletal: Negative for myalgias and neck pain.   Skin: Negative for rash.   Neurological: Negative for tremors, sensory change, speech change, focal weakness, seizures, loss of consciousness and headaches.   Endo/Heme/Allergies: Negative for environmental allergies and polydipsia. Does not bruise/bleed easily.   Psychiatric/Behavioral: Negative for hallucinations.       Physical Exam   Constitutional: She is oriented to person, place, and time.   HENT:   Head: Normocephalic and atraumatic.   Right Ear: External ear normal.   Left Ear: External ear normal.   Nose: Nose normal.   Mouth/Throat: Oropharynx is clear and moist.   Eyes: Conjunctivae and EOM are normal. Pupils are equal, round, and reactive to light. Right eye exhibits no discharge. Left eye exhibits no discharge.   Neck: Neck supple. No JVD present. No tracheal deviation present.   Cardiovascular: Intact distal pulses.  Exam reveals no gallop and no friction rub.    Sinus rhythm   Pulmonary/Chest: No stridor. She has no wheezes. She has rales (Very few crackles at the bases).   Abdominal: Soft. There  is tenderness (Mild - left upper quadrant). There is no rebound.   Musculoskeletal: She exhibits no tenderness or deformity.   No clubbing or cyanosis   Neurological: She is oriented to person, place, and time.   Currently sedated after receiving her pain medication and Xanax.  Moves all 4 extremities.  No focal weakness.   Skin: Skin is warm and dry. No rash noted. She is not diaphoretic. No erythema.       PFSH:  No change.    Respiratory:     Pulse Oximetry: 94 %  CXR personally reviewed and compared to prior images  CXR with improved right lower lobe opacification compared to 1/20.  There is minimal left lower lobe subsegmental atelectasis.    HemoDynamics:  Pulse: (!) 56, Heart Rate (Monitored): (!) 55  Blood Pressure: 126/59, NIBP: 104/49        Recent Labs      04/27/18   1640   TROPONINI  <0.01   BNPBTYPENAT  74       Neuro:    Fluids:  Intake/Output       04/26/18 0700 - 04/27/18 0659 (Not Admitted) 04/27/18 0700 - 04/28/18 0659 04/28/18 0700 - 04/29/18 0659      9539-4230 0369-1554 Total 1860-2884 6052-9927 Total 5618-0818 0682-5023 Total       Intake    P.O.  --  -- --  --  1000 1000  --  -- --    P.O. -- -- -- -- 1000 1000 -- -- --    I.V.  --  -- --  --  4400 4400  --  -- --    IV Piggyback Volume (zosyn) -- -- -- -- 300 300 -- -- --    IV Piggyback Volume (IV Piggyback) -- -- -- -- 500 500 -- -- --    IV Volume (NS bolus) -- -- -- -- 2800 2800 -- -- --    IV Volume (NS maint) -- -- -- -- 800 800 -- -- --    Total Intake -- -- -- -- 5400 5400 -- -- --       Output    Urine  --  -- --  --  650 650  --  -- --    Number of Times Voided -- -- -- -- 3 x 3 x -- -- --    Urine Void (mL) (non-catheter) -- -- -- -- 650 650 -- -- --    Stool  --  -- --  --  -- --  --  -- --    Number of Times Stooled -- -- -- -- 0 x 0 x -- -- --    Total Output -- -- -- -- 650 650 -- -- --       Net I/O     -- -- -- -- 3348 6269 -- -- --        Weight: 76.5 kg (168 lb 10.4 oz)  Recent Labs      04/27/18   1640  04/28/18   6506    SODIUM  141  139   POTASSIUM  3.9  4.5   CHLORIDE  104  108   CO2  28  23   BUN  14  16   CREATININE  1.27  1.19   MAGNESIUM   --   1.8   CALCIUM  8.7  7.7*       GI/Nutrition:    Liver Function  Recent Labs      18   1640  180   ALTSGPT  13  13   ASTSGOT  19  17   ALKPHOSPHAT  24*  34   TBILIRUBIN  0.3  0.3   GLUCOSE  107*  333*       Heme:  Recent Labs      18   1640  18   RBC  3.85*  4.39   HEMOGLOBIN  11.1*  12.5   HEMATOCRIT  35.1*  40.4   PLATELETCT  76*  59*   PROTHROMBTM  12.9   --    APTT  27.8   --    INR  1.00   --        Infectious Disease:  Temp  Av.8 °C (98.2 °F)  Min: 36.6 °C (97.8 °F)  Max: 36.9 °C (98.4 °F)    Recent Labs      18   WBC  2.9*  3.0*   NEUTSPOLYS  66.00  87.10*   LYMPHOCYTES  24.20  11.00*   MONOCYTES  6.80  1.30   EOSINOPHILS  2.00  0.00   BASOPHILS  0.70  0.30   ASTSGOT  19  17   ALTSGPT  13  13   ALKPHOSPHAT  24*  34   TBILIRUBIN  0.3  0.3     Current Facility-Administered Medications   Medication Dose Frequency Provider Last Rate Last Dose   • vancomycin 1,400 mg in  mL IVPB  1,400 mg Q12HR Jonn Gardiner M.D.       • tiotropium (SPIRIVA) 18 MCG inhalation capsule 1 Cap  1 Cap DAILY Jeremy M Gonda, M.D.       • ALPRAZolam (XANAX) tablet 1 mg  1 mg TID Jonn Gardiner M.D.   1 mg at 18   • aspirin EC (ECOTRIN) tablet 81 mg  81 mg DAILY Jonn Gardiner M.D.       • docusate sodium (COLACE) capsule 100 mg  100 mg BID Jonn Gardiner M.D.   100 mg at 18   • ferrous sulfate (FEOSOL) 220 (44 Fe) MG/5ML solution 220 mg  220 mg DAILY Jonn T Lindstedt, M.D.       • gabapentin (NEURONTIN) capsule 600 mg  600 mg TID Jonn Gardiner M.D.   600 mg at 18   • levothyroxine (SYNTHROID) tablet 137 mcg  137 mcg AM ES Jonn Gardiner M.D.       • mycophenolate (CELLCEPT) capsule 500 mg  500 mg BID Jonn Gardiner M.D.   500 mg at 18   • omeprazole (PRILOSEC)  capsule 40 mg  40 mg DAILY Jonn Gardiner M.D.       • pravastatin (PRAVACHOL) tablet 20 mg  20 mg QHS Jonn Gardiner M.D.       • sertraline (ZOLOFT) tablet 100 mg  100 mg QHS Jonn Gardiner M.D.   100 mg at 04/27/18 2026   • tacrolimus (PROGRAF) capsule 1.5 mg  1.5 mg BID Jonn Gardiner M.D.   1.5 mg at 04/27/18 2025   • Tadalafil (PAH) TABS 40 mg  40 mg QHS Jonn Gardiner M.D.   40 mg at 04/27/18 2029   • traZODone (DESYREL) tablet 100 mg  100 mg Nightly Jonn Gardiner M.D.   100 mg at 04/27/18 2321   • NS infusion 2,262 mL  30 mL/kg Once PRN Jonn Gardiner M.D.       • Respiratory Care per Protocol   Continuous RT Jonn Gardiner M.D.       • NS infusion   Continuous Jonn Gardiner M.D. 100 mL/hr at 04/27/18 2217     • piperacillin-tazobactam (ZOSYN) 4.5 g in  mL IVPB  4.5 g Q8HRS Jonn Gardiner M.D. 25 mL/hr at 04/28/18 0434 4.5 g at 04/28/18 0434   • MD ALERT... vancomycin per pharmacy protocol   pharmacy to dose Jonn Gardiner M.D.       • ondansetron (ZOFRAN) syringe/vial injection 4 mg  4 mg Q4HRS PRN Jonn Gardiner M.D.       • ondansetron (ZOFRAN ODT) dispertab 4 mg  4 mg Q4HRS PRN Jonn Gardiner M.D.       • hydrocortisone sodium succinate PF (SOLU-CORTEF) 100 MG injection 100 mg  100 mg Q8HRS Jonn Gardiner M.D.   100 mg at 04/28/18 0431   • ambrisentan (LETAIRIS) TABS 10 mg  10 mg DAILY Jonn Gardiner M.D.       • polyethylene glycol/lytes (MIRALAX) PACKET 1 Packet  1 Packet DAILY Jonn Gardiner M.D.       • oyster shell calcium/vitamin D 250-125 MG-UNIT tablet 1 Tab  1 Tab BID WITH MEALS Jaime Chan Jr., D.O.       • ascorbic acid tablet 500 mg  500 mg DAILY Jaime Chan Jr., D.O.       • oxymetazoline (AFRIN) 0.05 % nasal spray 2 Spray  2 Spray QHS Jaime Chan Jr. D.O.   2 [place] at 04/28/18 0008   • morphine 10 MG/5ML solution 15 mg  15 mg Q6HRS Jaime Chan Jr., D.O.        Or   • morphine 10 MG/5ML solution 20 mg  20 mg Q6HRS  DEMETRIS Massey Jr..MANIJNDER.       • HYDROXYPROPYL METHYLCELLULOSE 0.3 % GEL 1 Application  1 Application QHS DEMETRIS Massey Jr..TRISHA   1 Application at 04/27/18 2230   • morphine (pf) 4 mg/ml injection 4 mg  4 mg Q4HRS PRN DEMETRIS Massey Jr..O.   4 mg at 04/28/18 0407   • Linaclotide CAPS 290 mcg  290 mcg DAILY Jaime Chan Jr., D.O.         Last reviewed on 4/27/2018  5:30 PM by Colleen Walls    Quality  Measures:  Labs reviewed, Medications reviewed and Radiology images reviewed  Holbrook catheter: No Holbrook        DVT prophylaxis - mechanical: SCDs  Ulcer prophylaxis: Not indicated  Antibiotics: Treating active infection/contamination beyond 24 hours perioperative coverage        Assessment and Plan:    Unspecified hypotension   - Query sepsis versus adrenal insufficiency   - Continue intravenous fluids    Query sepsis   - Continue empiric Zosyn and vancomycin   - Lactic acid is not elevated   - Influenza screen negative   - Urinalysis unremarkable    Abdominal pain   - History of chronic left upper quadrant pain due to splenomegaly per patient   - Abdominal ultrasound with splenomegaly   - No free fluid noted on abdominal ultrasound    Acute on chronic hypoxemic respiratory failure   - Continue oxygen   - Uses 4 L NC at home    History of liver transplant with chronic immunosuppression   - Continue stress dose of hydrocortisone, 100 mg IV every 8 hours   - Continue current dose of mycophenolate and tacrolimus    Pulmonary hypertension   - Continue current dose of ambrisentan and tadalafil    Sarcoidosis   - Continue bronchodilators    Chronic pain   - Continue analgesia      I have assessed and reassessed her blood pressure, hemodynamics and cardiovascular status.  She has increased risk for worsening cardiovascular system dysfunction.    High risk of deterioration and worsening vital organ dysfunction and death without the above critical care interventions.    Critical Care Time:  35  minutes  90025  No time overlap  Time excludes procedures  Discussed with RN, RT, Team, Clinical pharmacist, Hospitalist

## 2018-04-28 NOTE — PROGRESS NOTES
Report received from PENNY Caballero. Pt arrived to unit via ICU bed on 6L NC on the monitor with 2 RN at 2300. Pt orientated to the unit. Fulfilled all pt requests for beverages and food. Remote supplied per request for the bed control. TV set up per pt request. Bed in lowest position with call light within reach. Lights, door, and curtain adjusted to pt request. Plan of care for the evening discussed with pt, pt in agreement at this time. Medication given, see MAR. RN to discuss current pt condition with pt spouse at this time as well with pt permission.

## 2018-04-28 NOTE — PROGRESS NOTES
2 RN skin assessment complete. No legions or wounds noted at this time. Pt has a PTA sarcoidosis spot on her right anterior shin.

## 2018-04-28 NOTE — ASSESSMENT & PLAN NOTE
This is a chronic condition for her.  -Has had bone marrow biopsies in the past which were unrevealing, at her baseline monitor closely with no evidence of homolysis or worsening bone marrow failure

## 2018-04-28 NOTE — PROGRESS NOTES
"Pt arrived to R100 via gurney from ED. NS bolus infusing. Pt states pain to LUQ abdomen, \"spleen, sharp pain that comnes and goes, its been going on for 9 years. Morphine helps\"; pt received morphine in ED at 1730.   "

## 2018-04-28 NOTE — PROGRESS NOTES
Pt requested BLT with fruit cup for dinner, and provided with said meal. Pt medicated with scheduled night rx, after review with Pt. Pt and  updated, pt to transfer to T6. Pt ambulated to bedside commode and voided 250ml clear yellow urine.

## 2018-04-28 NOTE — CONSULTS
Pulmonary & Critical Care Consult Note    DATE OF CONSULTATION:  4/27/2018     REFERRING PHYSICIAN:  Jonn Gardiner M.D.     CONSULTANT:  Jaime Chan DO     REASON FOR CONSULTATION:  Left upper quadrant pain, hypotension     HISTORY OF PRESENT ILLNESS:  58 yof with a complicated pmhx including sarcoidosis with subsequent pulm HTN, chronic hypoxic respiratory failure and hepatopulmonary syndrome requiring hepatic transplant now on triple immunosuppression with prednisone, prograf and cellcept, adrenal insufficiency, CKD, DM, pancytopenia, splenomegaly causing chronic abdominal pain and recurrent aspirations due to reflux after a gastric sleeve. Underwent a revision of her gastric sleeve to a Evon-en-Y gastric bypass 4 weeks ago and has had an uncomplicated recovery thus far, she saw her surgeon at [facility] 2 weeks ago and was told she was progressing well. Over the last several days has noticed increasing nausea, emesis and fatigue with SBPs in the 80s today prompting her visit to the ER today, she also states she has LUQ pain but states this is chronic, she attributes this to her spleen and says it gets worse when she is sick.. She does admit to multiple ill contacts with her grandkids. Denies cough, congestion, chest pain, increased dyspnea. In the ER her BP was initially 99/53. A CXR was obtained in the ER which showed nonspecific perihilar infiltrates., her WBC was found to be 2.9, Hg 11.1, Platelet count 76, Cr 1.27 which is mildly elevated from her baseline, Cortisol was 1.3, lactate 1.4, troponin <0.01, BNP 74, TSH 0.23, UA was negative for infection. She subsequently received a sepsis bolus and was started on broad spectrum abx. She is to be admitted to the ICU for further management.     PAST MEDICAL HISTORY:  Past Medical History:   Diagnosis Date   • Breath shortness    • Bronchitis      2016   • Cardiomegaly    • Chronic fatigue and malaise    • Cirrhosis (HCC) December 2011    [place]  [place] [facility] [place]   • CKD (chronic kidney disease) stage 3, GFR 30-59 ml/min    • Diabetes (CMS-HCC)     reports hx of, resolved w/weight loss. Reports still checking bloodsugars twice daily and using insulin PRN   • Fracture of left foot    • GERD (gastroesophageal reflux disease)         • H/O Clostridium difficile infection 02-17-17    reports \"16 months ago\". Current stool sample 01-26-17 neg   • Hypothyroid    • Low back pain 02-17-17    and left foot, 7/10   • Mild aortic regurgitation and aortic valve sclerosis     • On home oxygen therapy     4 liters, Dr. Gaming   • Platelet disorder ([facility])     low platelets   • Pneumonia     aspiration,    • Psychiatric disorder     Mood disorder   • Pulmonary hypertension ([facility])        • S/P cholecystectomy    • Small bowel obstruction ([facility])     01-   • Splenomegaly    • VRE (vancomycin-resistant Enterococci)     02-17-17, pt declines knowledge of this        PAST SURGICAL HISTORY:   Past Surgical History:   Procedure Laterality Date   • OTHER  12/11/2017    paniculectomy   • COLONOSCOPY N/A 3/27/2017    Procedure: COLONOSCOPY;  Surgeon: Osman Matt M.D.;  Location: SURGERY SAME DAY [facility];  Service:    • GASTROSCOPY N/A 3/27/2017    Procedure: GASTROSCOPY;  Surgeon: Osman Matt M.D.;  Location: SURGERY SAME DAY [facility] ORS;  Service:    • BREAST BIOPSY Left 2/21/2017    Procedure: BREAST BIOPSY - WIRE LOCALIZED EXCISONAL ;  Surgeon: Jane Zhao M.D.;  Location: SURGERY SAME DAY [facility] ORS;  Service:    • LUNG BIOPSY OPEN  11/2016   • OTHER ABDOMINAL SURGERY      Gasric Sleeve   • BONE MARROW ASPIRATION  3/16/2015    Performed by Freddie Hi M.D. at ENDOSCOPY [facility]   • BONE MARROW BIOPSY, NDL/TROCAR  3/16/2015    Performed by Freddie Hi M.D. at ENDOSCOPY [facility]   • RECOVERY  3/31/2014    Performed by [facility] at [facility]   • RECOVERY  3/24/2014    Performed by Cath-Recovery " Surgery at SURGERY SAME DAY [facility]   • RECOVERY  1/21/2014    Performed by [facility] at SURGERY SAME DAY [facility]   • BRONCHOSCOPY-ENDO  11/15/2013    Performed by Ha Perez M.D. at ENDOSCOPY [facility]   • RECOVERY  2/27/2013    Performed by [facility]Recovery Surgery at SURGERY SAME DAY [facility]S   • BONE MARROW ASPIRATION  12/31/2012    Performed by Josemanuel Real M.D. at ENDOSCOPY [facility]   • BONE MARROW BIOPSY, NDL/TROCAR  12/31/2012    Performed by Josemanuel Real M.D. at ENDOSCOPY [facility]   • GASTROSCOPY  9/28/2012    Performed by Aravind Richards M.D. at SURGERY [facility]   • SIGMOIDOSCOPY FLEX  9/26/2012    Performed by Kristopher Cardoso M.D. at ENDOSCOPY [facility]   • BRONCHOSCOPY-ENDO  6/19/2012    Performed by MARLENA MURILLO at ENDOSCOPY [facility]   • BRONCHOSCOPY-ENDO  5/29/2012    Performed by SUYAPA CAMARENA at ENDOSCOPY [facility]   • OTHER ABDOMINAL SURGERY  December 2011    [place] [facility] [place]   • GASTROSCOPY-ENDO  3/12/2010    Performed by CAMELIA SAMANO at ENDOSCOPY [facility]   • EXAM UNDER ANESTHESIA  11/11/2009    Performed by BIRD ABDI at SURGERY [facility]   • HEMORRHOIDECTOMY  11/11/2009    Performed by BIRD ABDI at SURGERY [facility]   • KELBY BY LAPAROSCOPY  9/19/2009    Performed by BIRD ABDI at SURGERY [facility]   • CARPAL TUNNEL RELEASE          • GASTRIC BYPASS LAPAROSCOPIC     • HYSTERECTOMY, TOTAL ABDOMINAL          • OTHER      Breast reduction   • PB ANESTH,NOSE,SINUS SURGERY     • TONSILLECTOMY          ALLERGIES:   Rhubarb; Organic nitrates; and Vancomycin hcl     MEDICATIONS PRIOR TO ADMISSION:  No current facility-administered medications on file prior to encounter.      Current Outpatient Prescriptions on File Prior to Encounter   Medication Sig Dispense Refill   • Calcium Citrate-Vitamin D 315-250 MG-UNIT Tab Take 1 Tab by mouth 2 Times a  Day.     • Wheat Dextrin (BENEFIBER) Powder Take 1 Dose by mouth every day.     • SPIRIVA HANDIHALER 18 MCG Cap INHALE 1 CAP BY MOUTH DAILY. 30 Cap 6   • gabapentin (NEURONTIN) 600 MG tablet Take 1 Tab by mouth 3 times a day. 90 Tab 2   • ALPRAZolam (XANAX) 1 MG Tab Take 1 Tab by mouth 3 times a day for 90 days. 90 Tab 2   • omeprazole (PRILOSEC) 40 MG delayed-release capsule Take 1 Cap by mouth every day. 90 Cap 0   • aspirin 81 MG tablet Take 1 Tab by mouth every day. 90 Tab 0   • polyethylene glycol 3350 (MIRALAX) Powder Take 17 g by mouth every day.     • Carboxymethylcell-Hypromellose (GENTEAL) 0.25-0.3 % Gel Place 0.3 mg in both eyes every bedtime.     • furosemide (LASIX) 40 MG Tab TAKE 1 TAB BY MOUTH EVERY DAY. 30 Tab 6   • Linaclotide (LINZESS) 290 MCG Cap Take 1 Cap by mouth every day. 90 Cap 3   • bisacodyl (DULCOLAX) 10 MG Suppos Insert 1 Suppository in rectum 1 time daily as needed (constipation). 30 Suppository 3   • sertraline (ZOLOFT) 100 MG Tab Take 1 Tab by mouth every bedtime. 90 Tab 4   • levothyroxine (SYNTHROID) 137 MCG Tab Take 1 Tab by mouth Every morning on an empty stomach. 90 Tab 3   • NON SPECIFIED Take 1 Cap by mouth every evening. Bariatric Dietary Supplment      • tacrolimus (PROGRAF) 0.5 MG Cap Take 1.5 mg by mouth 2 Times a Day. Uses a 1 mg and a 0.5 mg tab to equal a     • ambrisentan (LETAIRIS) 10 MG tablet Take 1 Tab by mouth every day. 30 Tab 11   • pravastatin (PRAVACHOL) 20 MG Tab TAKE 1 TAB BY MOUTH EVERY DAY. 30 Tab 11   • Tadalafil, PAH, (ADCIRCA) 20 MG Tab Take 40 mg by mouth every bedtime. Indications: Pulmonary Arterial Hypertension 180 Tab 3   • ferrous sulfate (FEOSOL) 220 (44 FE) MG/5ML Elixir Take 5 mL by mouth every day. 1 Bottle 10   • ascorbic acid (ASCORBIC ACID) 500 MG Tab Take 500 mg by mouth every day.     • ondansetron (ZOFRAN ODT) 4 MG TABLET DISPERSIBLE Take 1 Tab by mouth every 8 hours as needed for Nausea/Vomiting. Nausea and vomiting 270 Tab 4   •  "mycophenolate (CELLCEPT) 250 MG Cap Take 500 mg by mouth 2 times a day.         SOCIAL HISTORY:   Social History     Social History   • Marital status:      Spouse name: N/A   • Number of children: N/A   • Years of education: N/A     Occupational History   • Not on file.     Social History Main Topics   • Smoking status: Never Smoker   • Smokeless tobacco: Never Used      Comment: avoid all tobacco products   • Alcohol use No      Comment: 05/2009 quit drinking   • Drug use: No   • Sexual activity: Not on file     Other Topics Concern   • Not on file     Social History Narrative   • No narrative on file       FAMILY HISTORY:  Family History   Problem Relation Age of Onset   • Other Father      Unknown (dead 10 years)   • Diabetes Father    • Heart Disease Father    • Hypertension Father    • Hyperlipidemia Father    • Stroke Father    • Non-contributory Mother    • Hyperlipidemia Mother    • Alcohol/Drug Mother    • Cancer Paternal Aunt    • Alcohol/Drug Maternal Grandmother    • Alcohol/Drug Maternal Grandfather         REVIEW OF SYSTEMS:   Constitutional: No fever, no chills,  Respiratory: No cough, no hemoptysis, +dyspnea - chronic  Cardiovascular: No chest pain, no palpitations, no orthopnea, no PND, no lower extremity swelling  GI: +nausea, +vomiting, +abdominal pain, +diarrhea, no constipation, no hematochezia, no melena  : no dysuria, no frequency, no urgency  Endocrine: No polyuria, no polydipsia, no hair loss  Musculoskeletal: No joint swelling, no joint erythema, no arthralgia, no myalgias  Neuro: No seizures, no numbness, no tingling sensation, no extremity weakness, no change in vision.     PHYSICAL EXAMINATION:  /59   Pulse (!) 54   Temp 36.8 °C (98.2 °F)   Resp 20   Ht 1.727 m (5' 8\")   Wt 76.1 kg (167 lb 12.3 oz)   SpO2 100%   BMI 25.51 kg/m²   GENERAL:  Well developed, well nourished, female in no acute distress  HEENT:  Normocephalic, atraumatic, EOMI, external ears and nose " normal. Moist mucus membranes  NECK:  Supple, trachea midline  PULM: Clear to auscultation bilaterally  CVS: Regular rhythm, bradycardic rate  ABDOMEN: Soft, mildly tender over the left upper quadrant, bowel sounds present, well-healing surgical incisions, no rebound or guarding  EXTREMITIES: No significant clubbing, cyanosis, or edema  SKIN: Hyperpigmented lesion to the right anterior shin, dry  NEURO: Alert and oriented ×4, moves all 4 extremities, cranial nerves grossly intact    LABORATORY DATA:    Lab Results   Component Value Date/Time    WBC 2.9 (L) 04/27/2018 04:40 PM    RBC 3.85 (L) 04/27/2018 04:40 PM    HEMOGLOBIN 11.1 (L) 04/27/2018 04:40 PM    HEMATOCRIT 35.1 (L) 04/27/2018 04:40 PM    MCV 91.2 04/27/2018 04:40 PM    MCH 28.8 04/27/2018 04:40 PM    MCHC 31.6 (L) 04/27/2018 04:40 PM    MPV 9.7 04/27/2018 04:40 PM    NEUTSPOLYS 66.00 04/27/2018 04:40 PM    LYMPHOCYTES 24.20 04/27/2018 04:40 PM    MONOCYTES 6.80 04/27/2018 04:40 PM    EOSINOPHILS 2.00 04/27/2018 04:40 PM    BASOPHILS 0.70 04/27/2018 04:40 PM    HYPOCHROMIA 1+ 08/05/2014 08:16 AM    ANISOCYTOSIS 2+ 01/20/2018 02:52 AM      Lab Results   Component Value Date/Time    SODIUM 141 04/27/2018 04:40 PM    POTASSIUM 3.9 04/27/2018 04:40 PM    CHLORIDE 104 04/27/2018 04:40 PM    CO2 28 04/27/2018 04:40 PM    GLUCOSE 107 (H) 04/27/2018 04:40 PM    BUN 14 04/27/2018 04:40 PM    CREATININE 1.27 04/27/2018 04:40 PM      Lab Results   Component Value Date/Time    PROTHROMBTM 12.9 04/27/2018 04:40 PM    INR 1.00 04/27/2018 04:40 PM         IMAGING:   CXR (personally reviewed)  DX-CHEST-PORTABLE (1 VIEW)   Final Result         1.  Perihilar opacifications could be due to congestion edema bronchitis or bronchopneumonia.      2.  Probable minimal left pleural effusion.      3.  No focal consolidations identified.           ASSESSMENT/PLAN:  Hypotension   - Mild, query etiology; Differential includes: Endocrinologic (adrenal insufficiency), septic   - IV  fluids with good response   - PRN IV fluids   - PRN norepinephrine to maintain MAP >65 mmHg   - Stress dose steroids    Sepsis   - Unknown source, abdominal pain at baseline however she is at high likelihood for anastomotic failure given her chronic steroid/immunosuppressive therapies   - US abdomen to rule out free fluid, CT abdomen for any worsening   - Viral studies sent: CMV, EBV, influenza   - sepsis protocol   - Broad spectrum antibiotics: Zosyn, vancomycin   - 30 mL/kg crystalloid bolus provided   - PRN IVF bolus to maintain MAP >65 mmHg   - Pressors if needed to maintain MAP >65 mmHg   - blood, respiratory and urine cultures   - lactate every 4 hours until normalized or downtrending    Chronic hypoxic respiratory failure   - RT/O2 Protocols   - continue at 4L NC   - Titrate supplemental FiO2 to maintain SpO2 >92%    Immunocompromised state   - On triple immunosuppressive therapy   - With pancytopenia   - At high risk for invasive infections    Abdominal pain   - Baseline per patient due to splenomegaly   - US now and CT for any worsening or failure to improve    Abnormal chest x-ray   - perihilar infiltrates   - covering for HAP     Sarcoidosis   - continue medications    Pulmonary Hypertension   - Continue Letairis, tadalafil    Chronic pain   - Continue home pain regimen    Patient is critically ill at this time.  I have spent 90 minutes examining this patient, all lab data, x-ray, and discussion with RN, RT, patient, hospitalist, pharmacy. Critical care time: 90 min. No time overlap. Procedures not included in time. Thank you for asking me to consult on the patient.  I appreciate the opportunity to assist in their care and will follow along closely with you.    Jaime Chan, DO  Critical Care Medicine    This dictation was created using voice recognition software. The accuracy of the dictation is limited to the abilities of the software. Errors of grammar and possibly content are to be expected.

## 2018-04-28 NOTE — CARE PLAN
Problem: Communication  Goal: The ability to communicate needs accurately and effectively will improve    Intervention: Educate patient and significant other/support system about the plan of care, procedures, treatments, medications and allow for questions  Pt updated on POC and spoke with Intensivist.      Problem: Pain Management  Goal: Pain level will decrease to patient's comfort goal    Intervention: Follow pain managment plan developed in collaboration with patient and Interdisciplinary Team  Pt medicated with morphine per MD order, see MAR.

## 2018-04-28 NOTE — ASSESSMENT & PLAN NOTE
-Unclear if related to underlying hepatopulmonary syndrome, apparently worsened after liver transplant  -Continue outpatient tadalafil as well as ambrisentan, monitor closely, oxygen supplementation  I discussed her condition with Dr. Phan

## 2018-04-28 NOTE — PROGRESS NOTES
MD paged to verify STAT US order due to insufficient time NPO prior to scan. MD stated it is to look for anastomosis and NPO for 8 hours prior to scan is unnecessary at this time.

## 2018-04-28 NOTE — ED NOTES
The Medication Reconciliation process has been completed by interviewing the patient    Allergies have been reviewed  Antibiotic use in 30 days - none    Home Pharmacy:  [facility] [place]

## 2018-04-28 NOTE — PROGRESS NOTES
Pt transported from [address] to [address] via [place] on monitor with ACLS RN and CCT. Pt transported with all belongings, meds, and chart.    Pt. meds tagged by pharmacy placed in pt. Drawer.  Per pt.  to bring in additional meds to be verified.

## 2018-04-28 NOTE — ASSESSMENT & PLAN NOTE
-At her baseline with clear chest x-ray and clear lung exam, monitor closely  Pulmonology consulted.

## 2018-04-28 NOTE — PROGRESS NOTES
"Pharmacy Kinetics 58 y.o. female on vancomycin day # 1 2018    Vancomycin New Start    1900 mg iv loading dose administered  @ 2331      Indication for Treatment:   Empiric for sepsis     Pertinent history per medical record:   Admitted on 2018 for various complaints, including fatigue, NV, weakness, headache.  Patient with a complex PMH including liver xsplant on immunosuppression, adrenal insufficiency, recurrent aspiration pneumonia, recent Evon-en-Y. Empiric antibiotics have been ordered for sepsis with unclear source of infection.     Imaging ( CXR):   Perihilar opacifications could be due to congestion edema bronchitis or bronchopneumonia.  Probable minimal left pleural effusion. No focal consolidations identified.    Other antibiotics:   Zosyn     Allergies:  Rhubarb; Organic nitrates; and Vancomycin hcl     List concerns for renal function:   Hepatic disease/transplant, SCr elevated from baseline    Pertinent cultures to date:   Blood and urine in process, respiratory cultures needs to be collected     Recent Labs      18   1640   WBC  2.9*   NEUTSPOLYS  66.00     Recent Labs      18   1640   BUN  14   CREATININE  1.27   ALBUMIN  4.0      Gross per 24 hour   Intake                0 ml   Output              250 ml   Net             -250 ml      Blood pressure 126/59, pulse (!) 54, temperature 36.8 °C (98.2 °F), resp. rate 20, height 1.727 m (5' 8\"), weight 76.1 kg (167 lb 12.3 oz), SpO2 100 %. Temp (24hrs), Av.8 °C (98.2 °F), Min:36.8 °C (98.2 °F), Max:36.8 °C (98.2 °F)      A/P   1. Vancomycin dose change: initiate 1400 mg IV q12h (10, )   2. Next vancomycin level:  @ 0930 (ordered)   3. Goal trough: 16-20  4. Comments: Patient with many chronic medical conditions and comorbid conditions is being treated empirically for possible infection and sepsis. Will initiate ~18 mg/kg q12h regimen and recommend obtaining trough level prior to the fourth total dose to ensure " sufficient drug clearance. Pharmacy will continue to monitor and adjust dosing or recommend de-escalation as appropriate.     Liza Hernandez, PharmD

## 2018-04-28 NOTE — PROGRESS NOTES
"Pharmacy Kinetics 58 y.o. female on vancomycin day # 2  2018    Currently on Vancomycin 1400 mg iv q12hr    Indication for Treatment: Empiric for sepsis      Pertinent history per medical record: Admitted on 2018 for various complaints, including fatigue, NV, weakness, headache.  Patient with a complex PMH including liver xsplant on immunosuppression, adrenal insufficiency, recurrent aspiration pneumonia, recent Evon-en-Y. Empiric antibiotics have been ordered for sepsis with unclear source of infection.     Other antibiotics: Zosyn 4.5 g q 8 hrs    Allergies: Rhubarb; Organic nitrates; and Vancomycin hcl     List concerns for renal function: Hepatic disease/transplant, SCr elevated from baseline    Pertinent cultures to date:   18: PBC x 2 = NGTD    Recent Labs      18   1640  18   0420   WBC  2.9*  3.0*   NEUTSPOLYS  66.00  87.10*     Recent Labs      18   1640  18   0420   BUN  14  16   CREATININE  1.27  1.19   ALBUMIN  4.0  3.8     No results for input(s): VANCOTROUGH, VANCOPEAK, VANCORANDOM in the last 72 hours.  Intake/Output Summary (Last 24 hours) at 18 1055  Last data filed at 18 1000   Gross per 24 hour   Intake             6467 ml   Output             1350 ml   Net             5117 ml      Blood pressure 126/59, pulse (!) 57, temperature 36.2 °C (97.1 °F), resp. rate (!) 9, height 1.727 m (5' 8\"), weight 76.5 kg (168 lb 10.4 oz), SpO2 98 %, not currently breastfeeding. Temp (24hrs), Av.7 °C (98 °F), Min:36.2 °C (97.1 °F), Max:36.9 °C (98.4 °F)      A/P   1. Vancomycin dose change: no  2. Next vancomycin level: 0930 on   3. Goal trough: 16-20 mcg/ml  4. Comments: Vanco maintenance dosing started this AM. SCr is slightly improved. Will check vanco trough prior to the 4th total dose. Per discussion on rounds, DIPTI to be consulted.    Kingsley Quintero, PharmD    "

## 2018-04-28 NOTE — PROGRESS NOTES
[facility] Hospitalist Progress Note    Date of Service: 2018    Chief Complaint  58 y.o. female admitted 2018 with flank pain.    Interval Problem Update  Ms. Cuba has a history of sarcoidosis and liver transplant on chronic Prograf and Cellcept that has been having fatigue and weakness for the past 2 days. She has had an associated headache as well. Her chronic diarrhea is unchanged.   She had been hypotensive and was given the appropriate IV saline boluses. Her BP has been low though labile. She vomited this morning. She is on her home 5 liters oxygen. She notes that her spleen hurts worse than usual. Despite fluids and steroids, her BP is 97/48 with a pulse of 60.   Her blood cultures remain negative.  Consultants/Specialty  Critical Care. I discussed her condition with Dr. Kennedy on ICU Hot Rounds.    Disposition  ICU        Review of Systems   Constitutional: Negative for chills and fever.   Respiratory: Negative for cough.    Cardiovascular: Negative for chest pain and leg swelling.   Gastrointestinal:        Daily relistor   All other systems reviewed and are negative.     Physical Exam  Laboratory/Imaging   Hemodynamics  Temp (24hrs), Av.8 °C (98.2 °F), Min:36.6 °C (97.8 °F), Max:36.9 °C (98.4 °F)   Temperature: 36.6 °C (97.8 °F)  Pulse  Av.6  Min: 48  Max: 82 Heart Rate (Monitored): (!) 55  Blood Pressure: 126/59, NIBP: 104/49      Respiratory      Respiration: 16, Pulse Oximetry: 94 %     Work Of Breathing / Effort: Shallow  RUL Breath Sounds: Clear, RML Breath Sounds: Clear, RLL Breath Sounds: Diminished, MANJINDER Breath Sounds: Clear, LLL Breath Sounds: Clear    Fluids    Intake/Output Summary (Last 24 hours) at 18 0705  Last data filed at 18 0600   Gross per 24 hour   Intake             5400 ml   Output              650 ml   Net             4750 ml       Nutrition  Orders Placed This Encounter   Procedures   • Diet Order     Standing Status:   Standing     Number of  Occurrences:   1     Order Specific Question:   Diet:     Answer:   Cardiac [6]     Order Specific Question:   Diet:     Answer:   Hepatic [9]     Physical Exam   Constitutional: She is oriented to person, place, and time.   Neck: Normal range of motion. Neck supple.   Cardiovascular: Normal rate and regular rhythm.    Murmur heard.  Abdominal: Soft. She exhibits distension. There is no tenderness.   Multiple scars.   Musculoskeletal: She exhibits no edema or tenderness.   Neurological: She is alert and oriented to person, place, and time.   Skin: Skin is warm and dry. She is not diaphoretic. There is pallor.   Psychiatric: She has a normal mood and affect. Her behavior is normal.   Nursing note and vitals reviewed.      Recent Labs      04/27/18   1640  04/28/18   0420   WBC  2.9*  3.0*   RBC  3.85*  4.39   HEMOGLOBIN  11.1*  12.5   HEMATOCRIT  35.1*  40.4   MCV  91.2  92.0   MCH  28.8  28.5   MCHC  31.6*  30.9*   RDW  45.5  46.0   PLATELETCT  76*  59*   MPV  9.7  9.8     Recent Labs      04/27/18   1640  04/28/18   0420   SODIUM  141  139   POTASSIUM  3.9  4.5   CHLORIDE  104  108   CO2  28  23   GLUCOSE  107*  333*   BUN  14  16   CREATININE  1.27  1.19   CALCIUM  8.7  7.7*     Recent Labs      04/27/18   1640   APTT  27.8   INR  1.00     Recent Labs      04/27/18   1640   BNPBTYPENAT  74              Assessment/Plan     * Sepsis (HCC)- (present on admission)   Assessment & Plan    -This is sepsis (without associated acute organ dysfunction).   Cryptic source in the setting of an immunocompromised patient with underlying liver transplant   -Empiric antibiotic of IV vancomycin and Zosyn  -Follow up all cultures  She has been followed by [facility] in the past  -Consider rare things such as CMV or EBV  -Abdominal ultrasound  She has been started on IV hydrocortisone 100 mg every 8 hours in place of outpatient prednisone  -Admitted to the ICU  CMV and EBV have been sent.  Start midodrine due to  hypotension.        Immunocompromised state (HCC)- (present on admission)   Assessment & Plan    Continue outpatient transplant meds        Chronic respiratory failure with hypoxia (HCC)- (present on admission)   Assessment & Plan    -At her baseline with clear chest x-ray and clear lung exam, monitor closely  Pulmonology consulted.        Pancytopenia (HCC)- (present on admission)   Assessment & Plan    This is a chronic condition for her.  -Has had bone marrow biopsies in the past which were unrevealing, at her baseline monitor closely with no evidence of homolysis or worsening bone marrow failure        Thrombocytopenia (HCC)- (present on admission)   Assessment & Plan    -At her baseline, no evidence of overt bleeding, avoid blood thinners        GERD (gastroesophageal reflux disease)- (present on admission)   Assessment & Plan    -Continue outpatient omeprazole        Splenomegaly- (present on admission)   Assessment & Plan    -At her baseline, monitor closely        Pulmonary hypertension (HCC)- (present on admission)   Assessment & Plan    -Unclear if related to underlying hepatopulmonary syndrome, apparently worsened after liver transplant  -Continue outpatient tadalafil as well as ambrisentan, monitor closely, oxygen supplementation        Sarcoidosis (HCC)- (present on admission)   Assessment & Plan    -Appears to be quiescent at this time, continue outpatient immunosuppressive agents        Status post liver transplant Dr. Canada ([facility])- (present on admission)   Assessment & Plan    -No clear evidence of rejection at this time, and abdominal ultrasound, continue outpatient immunosuppressive agents and do IV stress steroids as outlined above  -Monitored tacrolimus levels closely          Quality-Core Measures   Reviewed items::  Labs reviewed, Medications reviewed and Radiology images reviewed  Holbrook catheter::  No Holbrook  DVT prophylaxis pharmacological::  Contraindicated - High bleeding risk

## 2018-04-28 NOTE — CARE PLAN
Problem: Communication  Goal: The ability to communicate needs accurately and effectively will improve  Outcome: PROGRESSING AS EXPECTED  Pt able to communicate effectively and make needs known. Pt able to verbalize understanding of education, medications, and instructions.    Problem: Bowel/Gastric:  Goal: Normal bowel function is maintained or improved  Outcome: PROGRESSING AS EXPECTED  Pt having regular BM, but has had some vomiting this AM.

## 2018-04-28 NOTE — ASSESSMENT & PLAN NOTE
-This is sepsis (without associated acute organ dysfunction).   Cryptic source in the setting of an immunocompromised patient with underlying liver transplant   -Empiric antibiotic of IV vancomycin and Zosyn and she will be seen by [facility]   Cultures are negative and await CMV/EBV  -Abdominal ultrasound  She has been started on IV hydrocortisone 100 mg every 8 hours due to outpatient prednisone use which will be stopped due to patient's request  Started midodrine due to hypotension.

## 2018-04-28 NOTE — H&P
Hospital Medicine History and Physical    Date of Service  4/27/2018    Chief Complaint  Chief Complaint   Patient presents with   • Flank Pain       History of Presenting Illness  58 y.o. female who presented 4/27/2018 with above chief complaint. Patient is an extremely complicated past medical history including a history of pulmonary arterial hypertension possibly related to a hepato-pulmonary syndrome along with underlying liver transplant secondary to cirrhosis from underlying sarcoidosis with disseminated sarcoidosis. She additionally has a history of splenomegaly as well as chronic respiratory failure with hypoxia and recently underwent surgery at [facility] for recurrent aspiration pneumonia that required a Evon-en-Y. Since she had this procedure done she actually has felt quite well and her breathing is improved however though over the last 2-3 days she's had increasing fatigue nausea vomiting and weakness with bruising that E Zierden above her baseline. She also endorses a generalized headache for the last week that somewhat persistent and she claims that she is not missed any of her medications. She denies any recent changes in her immunosuppressive agents but she does endorse multiple sick contacts including her grandchildren and she thinks she might be coming septic likely from a viral infection. She has chronic diarrhea which is at her baseline denies any blood in her urine or stool and denies any new skin changes either. She denies any cough.     Primary Care Physician  Elisa Phillip M.D.    Code Status  fc.[id number]    Review of Systems  Review of Systems   Constitutional: Positive for malaise/fatigue. Negative for chills, fever and weight loss.   HENT: Negative for hearing loss.    Eyes: Negative for blurred vision.   Respiratory: Positive for shortness of breath. Negative for cough, hemoptysis and sputum production.    Cardiovascular: Negative for chest pain, palpitations and leg swelling.   Gastrointestinal:  "Positive for diarrhea (chronic, at her baseline), nausea and vomiting. Negative for abdominal pain and heartburn.   Genitourinary: Negative for dysuria, frequency and urgency.   Musculoskeletal: Negative for myalgias.   Skin: Negative for itching.   Neurological: Positive for weakness. Negative for dizziness, focal weakness, loss of consciousness and headaches.   Endo/Heme/Allergies: Negative for environmental allergies.   Psychiatric/Behavioral: Negative for depression.   All other systems reviewed and are negative.         Past Medical History  Past Medical History:   Diagnosis Date   • Low back pain 02-17-17    and left foot, 7/10   • H/O Clostridium difficile infection 02-17-17    reports \"16 months ago\". Current stool sample 01-26-17 neg   • Cirrhosis ([facility]) December 2011    Status post liver transplant at [facility]   • Breath shortness    • Bronchitis      2016   • Cardiomegaly    • Chronic fatigue and malaise    • CKD (chronic kidney disease) stage 3, GFR 30-59 ml/min    • Diabetes ([facility])     reports hx of, resolved w/weight loss. Reports still checking bloodsugars twice daily and using insulin PRN   • Fracture of left foot    • GERD (gastroesophageal reflux disease)         • Hypothyroid    • Mild aortic regurgitation and aortic valve sclerosis     • On home oxygen therapy     4 liters, Dr. Gaming   • Platelet disorder ([facility])     low platelets   • Pneumonia     aspiration,    • Psychiatric disorder     Mood disorder   • Pulmonary hypertension ([facility])        • S/P cholecystectomy    • Small bowel obstruction ([facility])     01-   • Splenomegaly    • VRE (vancomycin-resistant Enterococci)     02-17-17, pt declines knowledge of this       Surgical History  Past Surgical History:   Procedure Laterality Date   • OTHER  12/11/2017    paniculectomy   • COLONOSCOPY N/A 3/27/2017    Procedure: COLONOSCOPY;  Surgeon: Osman Matt M.D.;  Location: SURGERY SAME DAY [facility];  Service:    • GASTROSCOPY N/A " 3/27/2017    Procedure: GASTROSCOPY;  Surgeon: Osman Matt M.D.;  Location: SURGERY SAME DAY [facility];  Service:    • BREAST BIOPSY Left 2/21/2017    Procedure: BREAST BIOPSY - WIRE LOCALIZED EXCISONAL ;  Surgeon: Jane Zhao M.D.;  Location: SURGERY SAME DAY [facility];  Service:    • LUNG BIOPSY OPEN  11/2016   • OTHER ABDOMINAL SURGERY      Gasric Sleeve   • BONE MARROW ASPIRATION  3/16/2015    Performed by Marlena Hi M.D. at ENDOSCOPY [facility]   • BONE MARROW BIOPSY, NDL/TROCAR  3/16/2015    Performed by Marlena Hi M.D. at ENDOSCOPY [facility]   • RECOVERY  3/31/2014    Performed by [facility] at [facility]   • RECOVERY  3/24/2014    Performed by Cath-Recovery Surgery at SURGERY SAME DAY [facility]   • RECOVERY  1/21/2014    Performed by [facility]Recovery Surgery at SURGERY SAME DAY [facility]   • BRONCHOSCOPY-ENDO  11/15/2013    Performed by Ha Perez M.D. at ENDOSCOPY [facility]   • RECOVERY  2/27/2013    Performed by [facility]Recovery Surgery at SURGERY SAME DAY [facility]   • BONE MARROW ASPIRATION  12/31/2012    Performed by Josemanuel Real M.D. at [facility]   • BONE MARROW BIOPSY, NDL/TROCAR  12/31/2012    Performed by Josemanuel Real M.D. at ENDOSCOPY [facility]   • GASTROSCOPY  9/28/2012    Performed by Aravind Richards M.D. at SURGERY [facility]   • SIGMOIDOSCOPY FLEX  9/26/2012    Performed by Kristopher Cardoso M.D. at [facility]   • BRONCHOSCOPY-ENDO  6/19/2012    Performed by MARLENA MURILLO at [facility]   • BRONCHOSCOPY-ENDO  5/29/2012    Performed by SUYAPA CAMARENA at [facility]   • OTHER ABDOMINAL SURGERY  December 2011    Liver transplant at [facility] by Dr. Canada.   • GASTROSCOPY-ENDO  3/12/2010    Performed by CAMELIA SAMANO at [facility]   • EXAM UNDER ANESTHESIA  11/11/2009    Performed by BIRD ABDI at [facility]   • HEMORRHOIDECTOMY  11/11/2009    Performed by BIRD ABDI at SURGERY [facility]   • KELBY BY LAPAROSCOPY  9/19/2009    Performed by BIRD ABDI at SURGERY [facility]   • CARPAL TUNNEL RELEASE          • GASTRIC BYPASS LAPAROSCOPIC     • HYSTERECTOMY, TOTAL ABDOMINAL          • OTHER      Breast reduction   • PB ANESTH,NOSE,SINUS SURGERY     • TONSILLECTOMY         Medications  No current facility-administered medications on file prior to encounter.      Current Outpatient Prescriptions on File Prior to Encounter   Medication Sig Dispense Refill   • Calcium Citrate-Vitamin D 315-250 MG-UNIT Tab Take 1 Tab by mouth 2 Times a Day.     • Wheat Dextrin (BENEFIBER) Powder Take 1 Dose by mouth every day.     • SPIRIVA HANDIHALER 18 MCG Cap INHALE 1 CAP BY MOUTH DAILY. 30 Cap 6   • gabapentin (NEURONTIN) 600 MG tablet Take 1 Tab by mouth 3 times a day. 90 Tab 2   • ALPRAZolam (XANAX) 1 MG Tab Take 1 Tab by mouth 3 times a day for 90 days. 90 Tab 2   • omeprazole (PRILOSEC) 40 MG delayed-release capsule Take 1 Cap by mouth every day. 90 Cap 0   • aspirin 81 MG tablet Take 1 Tab by mouth every day. 90 Tab 0   • polyethylene glycol 3350 (MIRALAX) Powder Take 17 g by mouth every day.     • Carboxymethylcell-Hypromellose (GENTEAL) 0.25-0.3 % Gel Place 0.3 mg in both eyes every bedtime.     • furosemide (LASIX) 40 MG Tab TAKE 1 TAB BY MOUTH EVERY DAY. 30 Tab 6   • Linaclotide (LINZESS) 290 MCG Cap Take 1 Cap by mouth every day. 90 Cap 3   • bisacodyl (DULCOLAX) 10 MG Suppos Insert 1 Suppository in rectum 1 time daily as needed (constipation). 30 Suppository 3   • sertraline (ZOLOFT) 100 MG Tab Take 1 Tab by mouth every bedtime. 90 Tab 4   • levothyroxine (SYNTHROID) 137 MCG Tab Take 1 Tab by mouth Every morning on an empty stomach. 90 Tab 3   • NON SPECIFIED Take 1 Cap by mouth every evening. Bariatric Dietary Supplment      • tacrolimus (PROGRAF) 0.5 MG Cap Take 1.5 mg by mouth 2 Times a Day. Uses a 1  mg and a 0.5 mg tab to equal a     • ambrisentan (LETAIRIS) 10 MG tablet Take 1 Tab by mouth every day. 30 Tab 11   • pravastatin (PRAVACHOL) 20 MG Tab TAKE 1 TAB BY MOUTH EVERY DAY. 30 Tab 11   • Tadalafil, PAH, (ADCIRCA) 20 MG Tab Take 40 mg by mouth every bedtime. Indications: Pulmonary Arterial Hypertension 180 Tab 3   • ferrous sulfate (FEOSOL) 220 (44 FE) MG/5ML Elixir Take 5 mL by mouth every day. 1 Bottle 10   • ascorbic acid (ASCORBIC ACID) 500 MG Tab Take 500 mg by mouth every day.     • ondansetron (ZOFRAN ODT) 4 MG TABLET DISPERSIBLE Take 1 Tab by mouth every 8 hours as needed for Nausea/Vomiting. Nausea and vomiting 270 Tab 4   • mycophenolate (CELLCEPT) 250 MG Cap Take 500 mg by mouth 2 times a day.         Family History  Family History   Problem Relation Age of Onset   • Other Father      Unknown (dead 10 years)   • Diabetes Father    • Heart Disease Father    • Hypertension Father    • Hyperlipidemia Father    • Stroke Father    • Non-contributory Mother    • Hyperlipidemia Mother    • Alcohol/Drug Mother    • Cancer Paternal Aunt    • Alcohol/Drug Maternal Grandmother    • Alcohol/Drug Maternal Grandfather      Family History   Problem Relation Age of Onset   • Other Father      Unknown (dead 10 years)   • Diabetes Father    • Heart Disease Father    • Hypertension Father    • Hyperlipidemia Father    • Stroke Father    • Non-contributory Mother    • Hyperlipidemia Mother    • Alcohol/Drug Mother    • Cancer Paternal Aunt    • Alcohol/Drug Maternal Grandmother    • Alcohol/Drug Maternal Grandfather       Social History  Social History   Substance Use Topics   • Smoking status: Never Smoker   • Smokeless tobacco: Never Used      Comment: avoid all tobacco products   • Alcohol use No      Comment: 05/2009 quit drinking       Allergies  Allergies   Allergen Reactions   • Rhubarb Anaphylaxis   • Organic Nitrates      Nitroglycerin products should be avoided with the use of PDE-5 inhibitors as the  combination can result in severe hypotension.   • Vancomycin Hcl      Causes red man syndrome when infused to fast          Physical Exam  Laboratory   Hemodynamics  Temp (24hrs), Av.8 °C (98.3 °F), Min:36.8 °C (98.2 °F), Max:36.9 °C (98.4 °F)   Temperature: 36.9 °C (98.4 °F)  Pulse  Av.8  Min: 48  Max: 82 Heart Rate (Monitored): 82  Blood Pressure: 126/59, NIBP: 124/63      Respiratory      Respiration: 14, Pulse Oximetry: 100 %             Physical Exam   Constitutional: She is oriented to person, place, and time. She appears well-developed and well-nourished. No distress.   HENT:   Head: Normocephalic and atraumatic.   Mouth/Throat: No oropharyngeal exudate.   NC in place   Eyes: Pupils are equal, round, and reactive to light. No scleral icterus.   Neck: Normal range of motion. Neck supple. No thyromegaly present.   Cardiovascular: Normal rate, regular rhythm, normal heart sounds and intact distal pulses.    No murmur heard.  Pulmonary/Chest: Effort normal and breath sounds normal. No respiratory distress. She has no wheezes. She has no rales.   Abdominal: Soft. Bowel sounds are normal. She exhibits no distension. There is tenderness (generalized, mild, multiple surgical scars, all well healed).   Musculoskeletal: Normal range of motion. She exhibits no edema or tenderness.   Neurological: She is alert and oriented to person, place, and time. No cranial nerve deficit.   Skin: Skin is warm and dry. No rash noted.   Psychiatric: She has a normal mood and affect.   Nursing note and vitals reviewed.      Recent Labs      18   1640   WBC  2.9*   RBC  3.85*   HEMOGLOBIN  11.1*   HEMATOCRIT  35.1*   MCV  91.2   MCH  28.8   MCHC  31.6*   RDW  45.5   PLATELETCT  76*   MPV  9.7     Recent Labs      18   1640   SODIUM  141   POTASSIUM  3.9   CHLORIDE  104   CO2  28   GLUCOSE  107*   BUN  14   CREATININE  1.27   CALCIUM  8.7     Recent Labs      18   1640   APTT  27.8   INR  1.00     Recent Labs       04/27/18   1640   BNPBTYPENAT  74           Imaging  DX-CHEST-PORTABLE (1 VIEW)   Final Result         1.  Perihilar opacifications could be due to congestion edema bronchitis or bronchopneumonia.      2.  Probable minimal left pleural effusion.      3.  No focal consolidations identified.      US-ABDOMEN COMPLETE SURVEY    (Results Pending)        Assessment/Plan     I anticipate this patient will require at least two midnights for appropriate medical management, necessitating inpatient admission.    * Sepsis (HCC)- (present on admission)   Assessment & Plan    -This is sepsis (without associated acute organ dysfunction).   -Presumed at this time, source is hard to localize an unclear origin bacterial, she has a very complicated Past medical history and is profoundly immunosuppressed for underlying liver transplant as well as sarcoidosis  -Empiric antibiotic of IV vancomycin and Zosyn  -Follow up all cultures, low threshold to consult infectious disease  -Consider rare things such as CMV or EBV  -Abdominal ultrasound  -No clear evidence of adrenal crisis at this time  -Start IV hydrocortisone 100 mg every 8 hours in place of outpatient prednisone  -Admitted to the ICU        Immunocompromised state (HCC)- (present on admission)   Assessment & Plan    -As outlined above        Chronic respiratory failure with hypoxia (HCC)- (present on admission)   Assessment & Plan    -At her baseline with clear chest x-ray and clear lung exam, monitor closely        Pancytopenia (CMS-HCC)- (present on admission)   Assessment & Plan    -Has had bone marrow biopsies in the past which were unrevealing, at her baseline monitor closely with no evidence of homolysis or worsening bone marrow failure        Thrombocytopenia (HCC)- (present on admission)   Assessment & Plan    -At her baseline, no evidence of overt bleeding, avoid blood thinners        Mood disorder (HCC)- (present on admission)   Assessment & Plan    -Continue  outpatient Zoloft        GERD (gastroesophageal reflux disease)- (present on admission)   Assessment & Plan    -Continue outpatient omeprazole        Splenomegaly- (present on admission)   Assessment & Plan    -At her baseline, monitor closely        Pulmonary hypertension (HCC)- (present on admission)   Assessment & Plan    -Unclear if related to underlying hepatopulmonary syndrome, apparently worsened after liver transplant  -Continue outpatient tadalafil as well as ambrisentan, monitor closely, oxygen supplementation        Sarcoidosis (HCC)- (present on admission)   Assessment & Plan    -Appears to be quiescent at this time, continue outpatient immunosuppressive agents        Status post liver transplant Dr. Canada ([facility])- (present on admission)   Assessment & Plan    -No clear evidence of rejection at this time, and abdominal ultrasound, continue outpatient immunosuppressive agents and do IV stress steroids as outlined above  -Monitored tacrolimus levels closely            VTE prophylaxis SCD's.

## 2018-04-29 LAB
ALBUMIN SERPL BCP-MCNC: 3 G/DL (ref 3.2–4.9)
ALBUMIN/GLOB SERPL: 1.8 G/DL
ALP SERPL-CCNC: 15 U/L (ref 30–99)
ALT SERPL-CCNC: 9 U/L (ref 2–50)
ANION GAP SERPL CALC-SCNC: 7 MMOL/L (ref 0–11.9)
AST SERPL-CCNC: 11 U/L (ref 12–45)
BACTERIA UR CULT: NORMAL
BASOPHILS # BLD AUTO: 0.2 % (ref 0–1.8)
BASOPHILS # BLD: 0.01 K/UL (ref 0–0.12)
BILIRUB SERPL-MCNC: 0.2 MG/DL (ref 0.1–1.5)
BUN SERPL-MCNC: 13 MG/DL (ref 8–22)
CALCIUM SERPL-MCNC: 7.2 MG/DL (ref 8.5–10.5)
CHLORIDE SERPL-SCNC: 115 MMOL/L (ref 96–112)
CO2 SERPL-SCNC: 23 MMOL/L (ref 20–33)
CREAT SERPL-MCNC: 0.91 MG/DL (ref 0.5–1.4)
EOSINOPHIL # BLD AUTO: 0 K/UL (ref 0–0.51)
EOSINOPHIL NFR BLD: 0 % (ref 0–6.9)
ERYTHROCYTE [DISTWIDTH] IN BLOOD BY AUTOMATED COUNT: 45 FL (ref 35.9–50)
GLOBULIN SER CALC-MCNC: 1.7 G/DL (ref 1.9–3.5)
GLUCOSE SERPL-MCNC: 171 MG/DL (ref 65–99)
HCT VFR BLD AUTO: 35.7 % (ref 37–47)
HGB BLD-MCNC: 11.3 G/DL (ref 12–16)
IMM GRANULOCYTES # BLD AUTO: 0.02 K/UL (ref 0–0.11)
IMM GRANULOCYTES NFR BLD AUTO: 0.4 % (ref 0–0.9)
LYMPHOCYTES # BLD AUTO: 0.74 K/UL (ref 1–4.8)
LYMPHOCYTES NFR BLD: 13.8 % (ref 22–41)
MCH RBC QN AUTO: 28.7 PG (ref 27–33)
MCHC RBC AUTO-ENTMCNC: 31.7 G/DL (ref 33.6–35)
MCV RBC AUTO: 90.6 FL (ref 81.4–97.8)
MONOCYTES # BLD AUTO: 0.28 K/UL (ref 0–0.85)
MONOCYTES NFR BLD AUTO: 5.2 % (ref 0–13.4)
NEUTROPHILS # BLD AUTO: 4.32 K/UL (ref 2–7.15)
NEUTROPHILS NFR BLD: 80.4 % (ref 44–72)
NRBC # BLD AUTO: 0 K/UL
NRBC BLD-RTO: 0 /100 WBC
PLATELET # BLD AUTO: 74 K/UL (ref 164–446)
PMV BLD AUTO: 9.9 FL (ref 9–12.9)
POTASSIUM SERPL-SCNC: 3.6 MMOL/L (ref 3.6–5.5)
PROT SERPL-MCNC: 4.7 G/DL (ref 6–8.2)
RBC # BLD AUTO: 3.94 M/UL (ref 4.2–5.4)
SIGNIFICANT IND 70042: NORMAL
SITE SITE: NORMAL
SODIUM SERPL-SCNC: 145 MMOL/L (ref 135–145)
SOURCE SOURCE: NORMAL
WBC # BLD AUTO: 5.4 K/UL (ref 4.8–10.8)

## 2018-04-29 PROCEDURE — 700102 HCHG RX REV CODE 250 W/ 637 OVERRIDE(OP): Performed by: HOSPITALIST

## 2018-04-29 PROCEDURE — 770022 HCHG ROOM/CARE - ICU (200)

## 2018-04-29 PROCEDURE — 700111 HCHG RX REV CODE 636 W/ 250 OVERRIDE (IP): Performed by: HOSPITALIST

## 2018-04-29 PROCEDURE — 700111 HCHG RX REV CODE 636 W/ 250 OVERRIDE (IP): Performed by: INTERNAL MEDICINE

## 2018-04-29 PROCEDURE — 700105 HCHG RX REV CODE 258: Performed by: INTERNAL MEDICINE

## 2018-04-29 PROCEDURE — A9270 NON-COVERED ITEM OR SERVICE: HCPCS | Performed by: INTERNAL MEDICINE

## 2018-04-29 PROCEDURE — 700112 HCHG RX REV CODE 229: Performed by: INTERNAL MEDICINE

## 2018-04-29 PROCEDURE — 80053 COMPREHEN METABOLIC PANEL: CPT

## 2018-04-29 PROCEDURE — A9270 NON-COVERED ITEM OR SERVICE: HCPCS | Performed by: HOSPITALIST

## 2018-04-29 PROCEDURE — 99233 SBSQ HOSP IP/OBS HIGH 50: CPT | Performed by: HOSPITALIST

## 2018-04-29 PROCEDURE — 85025 COMPLETE CBC W/AUTO DIFF WBC: CPT

## 2018-04-29 PROCEDURE — 700102 HCHG RX REV CODE 250 W/ 637 OVERRIDE(OP): Performed by: INTERNAL MEDICINE

## 2018-04-29 RX ADMIN — TADALAFIL 40 MG: 20 TABLET ORAL at 21:00

## 2018-04-29 RX ADMIN — MORPHINE SULFATE 4 MG: 4 INJECTION INTRAVENOUS at 21:50

## 2018-04-29 RX ADMIN — PRAVASTATIN SODIUM 20 MG: 20 TABLET ORAL at 20:49

## 2018-04-29 RX ADMIN — TACROLIMUS 1.5 MG: 1 CAPSULE ORAL at 07:52

## 2018-04-29 RX ADMIN — OXYCODONE HYDROCHLORIDE AND ACETAMINOPHEN 500 MG: 500 TABLET ORAL at 09:40

## 2018-04-29 RX ADMIN — AMBRISENTAN 10 MG: 10 TABLET, FILM COATED ORAL at 09:50

## 2018-04-29 RX ADMIN — TACROLIMUS 1.5 MG: 1 CAPSULE ORAL at 20:49

## 2018-04-29 RX ADMIN — TIOTROPIUM BROMIDE 1 CAPSULE: 18 CAPSULE ORAL; RESPIRATORY (INHALATION) at 09:53

## 2018-04-29 RX ADMIN — PREDNISONE 7 MG: 1 TABLET ORAL at 14:28

## 2018-04-29 RX ADMIN — MYCOPHENOLATE MOFETIL 500 MG: 250 CAPSULE ORAL at 20:51

## 2018-04-29 RX ADMIN — CALCIUM CARBONATE-CHOLECALCIFEROL TAB 250 MG-125 UNIT 1 TABLET: 250-125 TAB at 17:34

## 2018-04-29 RX ADMIN — VANCOMYCIN HYDROCHLORIDE 1400 MG: 100 INJECTION, POWDER, LYOPHILIZED, FOR SOLUTION INTRAVENOUS at 20:48

## 2018-04-29 RX ADMIN — TRAZODONE HYDROCHLORIDE 100 MG: 50 TABLET ORAL at 20:48

## 2018-04-29 RX ADMIN — PIPERACILLIN AND TAZOBACTAM 4.5 G: 4; .5 INJECTION, POWDER, LYOPHILIZED, FOR SOLUTION INTRAVENOUS; PARENTERAL at 20:48

## 2018-04-29 RX ADMIN — MORPHINE SULFATE 4 MG: 4 INJECTION INTRAVENOUS at 13:26

## 2018-04-29 RX ADMIN — OMEPRAZOLE 40 MG: 20 CAPSULE, DELAYED RELEASE ORAL at 09:39

## 2018-04-29 RX ADMIN — ONDANSETRON 4 MG: 2 INJECTION, SOLUTION INTRAMUSCULAR; INTRAVENOUS at 21:56

## 2018-04-29 RX ADMIN — GABAPENTIN 600 MG: 300 CAPSULE ORAL at 20:50

## 2018-04-29 RX ADMIN — CALCIUM CARBONATE-CHOLECALCIFEROL TAB 250 MG-125 UNIT 1 TABLET: 250-125 TAB at 09:40

## 2018-04-29 RX ADMIN — ASPIRIN 81 MG: 81 TABLET, COATED ORAL at 09:40

## 2018-04-29 RX ADMIN — MIDODRINE HYDROCHLORIDE 5 MG: 5 TABLET ORAL at 05:35

## 2018-04-29 RX ADMIN — MORPHINE SULFATE 4 MG: 4 INJECTION INTRAVENOUS at 09:23

## 2018-04-29 RX ADMIN — PIPERACILLIN AND TAZOBACTAM 4.5 G: 4; .5 INJECTION, POWDER, LYOPHILIZED, FOR SOLUTION INTRAVENOUS; PARENTERAL at 13:29

## 2018-04-29 RX ADMIN — DOCUSATE SODIUM 100 MG: 100 CAPSULE ORAL at 20:50

## 2018-04-29 RX ADMIN — GABAPENTIN 600 MG: 300 CAPSULE ORAL at 09:39

## 2018-04-29 RX ADMIN — HYDROCORTISONE SODIUM SUCCINATE 100 MG: 100 INJECTION, POWDER, FOR SOLUTION INTRAMUSCULAR; INTRAVENOUS at 05:36

## 2018-04-29 RX ADMIN — PIPERACILLIN AND TAZOBACTAM 4.5 G: 4; .5 INJECTION, POWDER, LYOPHILIZED, FOR SOLUTION INTRAVENOUS; PARENTERAL at 05:36

## 2018-04-29 RX ADMIN — ALPRAZOLAM 1 MG: 0.25 TABLET ORAL at 20:50

## 2018-04-29 RX ADMIN — VANCOMYCIN HYDROCHLORIDE 1400 MG: 100 INJECTION, POWDER, LYOPHILIZED, FOR SOLUTION INTRAVENOUS at 10:13

## 2018-04-29 RX ADMIN — Medication 220 MG: at 07:58

## 2018-04-29 RX ADMIN — ALPRAZOLAM 1 MG: 0.25 TABLET ORAL at 05:35

## 2018-04-29 RX ADMIN — METHYLNALTREXONE BROMIDE 12 MG: 12 INJECTION, SOLUTION SUBCUTANEOUS at 05:39

## 2018-04-29 RX ADMIN — MORPHINE SULFATE 4 MG: 4 INJECTION INTRAVENOUS at 17:34

## 2018-04-29 RX ADMIN — GABAPENTIN 600 MG: 300 CAPSULE ORAL at 14:30

## 2018-04-29 RX ADMIN — ONDANSETRON 4 MG: 2 INJECTION, SOLUTION INTRAMUSCULAR; INTRAVENOUS at 09:49

## 2018-04-29 RX ADMIN — OXYMETAZOLINE HYDROCHLORIDE 2 SPRAY: 5 SPRAY NASAL at 20:53

## 2018-04-29 RX ADMIN — LEVOTHYROXINE SODIUM 137 MCG: 137 TABLET ORAL at 05:35

## 2018-04-29 RX ADMIN — SERTRALINE 100 MG: 100 TABLET, FILM COATED ORAL at 20:50

## 2018-04-29 RX ADMIN — MIDODRINE HYDROCHLORIDE 5 MG: 5 TABLET ORAL at 12:19

## 2018-04-29 RX ADMIN — DOCUSATE SODIUM 100 MG: 100 CAPSULE ORAL at 09:40

## 2018-04-29 RX ADMIN — MIDODRINE HYDROCHLORIDE 5 MG: 5 TABLET ORAL at 17:34

## 2018-04-29 RX ADMIN — ALPRAZOLAM 1 MG: 0.25 TABLET ORAL at 13:12

## 2018-04-29 RX ADMIN — MYCOPHENOLATE MOFETIL 500 MG: 250 CAPSULE ORAL at 07:51

## 2018-04-29 RX ADMIN — MORPHINE SULFATE 4 MG: 4 INJECTION INTRAVENOUS at 05:35

## 2018-04-29 RX ADMIN — ONDANSETRON 4 MG: 2 INJECTION, SOLUTION INTRAMUSCULAR; INTRAVENOUS at 18:23

## 2018-04-29 ASSESSMENT — PATIENT HEALTH QUESTIONNAIRE - PHQ9
7. TROUBLE CONCENTRATING ON THINGS, SUCH AS READING THE NEWSPAPER OR WATCHING TELEVISION: NOT AT ALL
SUM OF ALL RESPONSES TO PHQ9 QUESTIONS 1 AND 2: 3
SUM OF ALL RESPONSES TO PHQ QUESTIONS 1-9: 11
3. TROUBLE FALLING OR STAYING ASLEEP OR SLEEPING TOO MUCH: NEARLY EVERY DAY
2. FEELING DOWN, DEPRESSED, IRRITABLE, OR HOPELESS: MORE THAN HALF THE DAYS
6. FEELING BAD ABOUT YOURSELF - OR THAT YOU ARE A FAILURE OR HAVE LET YOURSELF OR YOUR FAMILY DOWN: SEVERAL DAYS
1. LITTLE INTEREST OR PLEASURE IN DOING THINGS: SEVERAL DAYS
4. FEELING TIRED OR HAVING LITTLE ENERGY: NEARLY EVERY DAY
5. POOR APPETITE OR OVEREATING: NOT AT ALL
8. MOVING OR SPEAKING SO SLOWLY THAT OTHER PEOPLE COULD HAVE NOTICED. OR THE OPPOSITE, BEING SO FIGETY OR RESTLESS THAT YOU HAVE BEEN MOVING AROUND A LOT MORE THAN USUAL: NOT AT ALL
9. THOUGHTS THAT YOU WOULD BE BETTER OFF DEAD, OR OF HURTING YOURSELF: SEVERAL DAYS

## 2018-04-29 ASSESSMENT — PAIN SCALES - GENERAL
PAINLEVEL_OUTOF10: 7
PAINLEVEL_OUTOF10: 8
PAINLEVEL_OUTOF10: 9
PAINLEVEL_OUTOF10: 7
PAINLEVEL_OUTOF10: 4
PAINLEVEL_OUTOF10: 9
PAINLEVEL_OUTOF10: 5
PAINLEVEL_OUTOF10: 4
PAINLEVEL_OUTOF10: 4
PAINLEVEL_OUTOF10: 7
PAINLEVEL_OUTOF10: 9
PAINLEVEL_OUTOF10: 5
PAINLEVEL_OUTOF10: 4
PAINLEVEL_OUTOF10: 9
PAINLEVEL_OUTOF10: 7
PAINLEVEL_OUTOF10: 4

## 2018-04-29 ASSESSMENT — ENCOUNTER SYMPTOMS
SEIZURES: 0
EYE PAIN: 0
BRUISES/BLEEDS EASILY: 0
SENSORY CHANGE: 0
SPUTUM PRODUCTION: 0
ORTHOPNEA: 0
PALPITATIONS: 0
VOMITING: 1
STRIDOR: 0
HALLUCINATIONS: 0
FOCAL WEAKNESS: 0
PHOTOPHOBIA: 0
HEADACHES: 0
NECK PAIN: 0
NERVOUS/ANXIOUS: 1
FEVER: 0
CHILLS: 0
POLYDIPSIA: 0
BLURRED VISION: 0
LOSS OF CONSCIOUSNESS: 0
DOUBLE VISION: 0
HEMOPTYSIS: 0
COUGH: 0
SPEECH CHANGE: 0
ABDOMINAL PAIN: 1
NAUSEA: 1
MYALGIAS: 0
TREMORS: 0
DIAPHORESIS: 0

## 2018-04-29 NOTE — PROGRESS NOTES
"[facility] Hospitalist Progress Note    Date of Service: 2018    Chief Complaint  58 y.o. female admitted 2018 with flank pain.    Interval Problem Update  Ms. Cuba has a history of sarcoidosis and liver transplant on chronic Prograf and Cellcept that has been having fatigue and weakness for the past 2 days. She has had an associated headache as well. Her chronic diarrhea is unchanged.   She had been hypotensive and was given the appropriate IV saline boluses. She is on her home 5 liters oxygen. She notes that her spleen hurts worse than usual. Despite fluids and steroids her SBP has been 90's to 120's.  800 ml urine over night.   Her blood cultures remain negative. Ms. Cuba very anxious and tearful. She notes that, when steroids are increased, this happens to her and she is extremely emotional. She states that she cannot be on this dose of hydrocortisone. Her  is at bedside.  Consultants/Specialty  Critical Care. I discussed her condition with Dr. Perez  on ICU Hot Rounds.  Dr. Quintero, ID, will consult on   Disposition  ICU        Review of Systems   Constitutional: Negative for chills and fever.   Respiratory: Negative for cough.    Cardiovascular: Negative for chest pain and leg swelling.   Gastrointestinal:        Daily relistor due to chronic constipation  \"spleen pain\"   Psychiatric/Behavioral: The patient is nervous/anxious.    All other systems reviewed and are negative.     Physical Exam  Laboratory/Imaging   Hemodynamics  Temp (24hrs), Av.6 °C (97.8 °F), Min:36.4 °C (97.5 °F), Max:36.7 °C (98.1 °F)   Temperature: 36.7 °C (98.1 °F)  Pulse  Av.2  Min: 48  Max: 82 Heart Rate (Monitored): 76  NIBP: 118/53      Respiratory      Respiration: (!) 22, Pulse Oximetry: 97 %     Work Of Breathing / Effort: Shallow  RUL Breath Sounds: Clear, RML Breath Sounds: Clear, RLL Breath Sounds: Diminished, MANJINDER Breath Sounds: Clear, LLL Breath Sounds: Diminished    Fluids    Intake/Output " Summary (Last 24 hours) at 04/29/18 0924  Last data filed at 04/29/18 0600   Gross per 24 hour   Intake             3650 ml   Output             1750 ml   Net             1900 ml       Nutrition  Orders Placed This Encounter   Procedures   • DIET ORDER     Standing Status:   Standing     Number of Occurrences:   1     Order Specific Question:   Diet:     Answer:   Regular [1]     Physical Exam   Constitutional: She is oriented to person, place, and time. She appears distressed.   Neck: Normal range of motion. Neck supple.   Cardiovascular: Normal rate and regular rhythm.    Murmur heard.  Pulmonary/Chest: Effort normal. No respiratory distress.   Abdominal: Soft. She exhibits distension. There is no tenderness.   Multiple scars.   Musculoskeletal: She exhibits no edema or tenderness.   Neurological: She is alert and oriented to person, place, and time.   Skin: Skin is warm. She is diaphoretic. There is pallor.   Psychiatric:   Very anxious and crying   Nursing note and vitals reviewed.      Recent Labs      04/27/18   1640  04/28/18   0420  04/29/18   0540   WBC  2.9*  3.0*  5.4   RBC  3.85*  4.39  3.94*   HEMOGLOBIN  11.1*  12.5  11.3*   HEMATOCRIT  35.1*  40.4  35.7*   MCV  91.2  92.0  90.6   MCH  28.8  28.5  28.7   MCHC  31.6*  30.9*  31.7*   RDW  45.5  46.0  45.0   PLATELETCT  76*  59*  74*   MPV  9.7  9.8  9.9     Recent Labs      04/27/18   1640  04/28/18   0420  04/29/18   0540   SODIUM  141  139  145   POTASSIUM  3.9  4.5  3.6   CHLORIDE  104  108  115*   CO2  28  23  23   GLUCOSE  107*  333*  171*   BUN  14  16  13   CREATININE  1.27  1.19  0.91   CALCIUM  8.7  7.7*  7.2*     Recent Labs      04/27/18   1640   APTT  27.8   INR  1.00     Recent Labs      04/27/18   1640   BNPBTYPENAT  74              Assessment/Plan     * Sepsis (HCC)- (present on admission)   Assessment & Plan    -This is sepsis (without associated acute organ dysfunction).   Cryptic source in the setting of an immunocompromised patient  with underlying liver transplant   -Empiric antibiotic of IV vancomycin and Zosyn and she will be seen by [facility] Infect[facility]   Cultures are negative and await CMV/EBV  -Abdominal ultrasound  She has been started on IV hydrocortisone 100 mg every 8 hours due to outpatient prednisone use which will be stopped due to patient's request  Started midodrine due to hypotension.        Immunocompromised state (HCC)- (present on admission)   Assessment & Plan    Continue outpatient transplant meds  She has her [facility]        Chronic respiratory failure with hypoxia (HCC)- (present on admission)   Assessment & Plan    -At her baseline with clear chest x-ray and clear lung exam, monitor closely  Pulmonology consulted.        Pancytopenia (HCC)- (present on admission)   Assessment & Plan    This is a chronic condition for her.  -Has had bone marrow biopsies in the past which were unrevealing, at her baseline monitor closely with no evidence of homolysis or worsening bone marrow failure        Thrombocytopenia (HCC)- (present on admission)   Assessment & Plan    -At her baseline, no evidence of overt bleeding, avoid blood thinners        GERD (gastroesophageal reflux disease)- (present on admission)   Assessment & Plan    -Continue outpatient omeprazole        Splenomegaly- (present on admission)   Assessment & Plan    -At her baseline        Pulmonary hypertension (HCC)- (present on admission)   Assessment & Plan    -Unclear if related to underlying hepatopulmonary syndrome, apparently worsened after liver transplant  -Continue outpatient tadalafil as well as ambrisentan, monitor closely, oxygen supplementation  I discussed her condition with Dr. Phan        Sarcoidosis (HCC)- (present on admission)   Assessment & Plan    -Appears to be quiescent at this time, continue outpatient immunosuppressive agents        Status post liver transplant Dr. Canada ([facility])- (present on admission)   Assessment &  Plan    No evidence of rejection at this time  Continue outpatient immunosuppressive agents           Quality-Core Measures   Reviewed items::  Labs reviewed and Medications reviewed  Holbrook catheter::  No Holbrook  DVT prophylaxis pharmacological::  Contraindicated - High bleeding risk

## 2018-04-29 NOTE — PROGRESS NOTES
Pulmonary Critical Care Progress Note      Date of admission:  4/27/2018    Chief Complaint:  Abdominal pain    Interval Events:  24 hour interval history reviewed   - a/o x 4   - SR/SB   - no pressors currently   - afebrile   - brenden PO diet   - mobilized   - 5 L HOT (on 5 lpm)   - brenden PO diet   - vanco/Zosyn day 3, ? Sepsis vs adrenal crisis, cx's neg      Review of Systems   Constitutional: Negative for chills, diaphoresis and fever.   HENT: Negative for congestion, ear discharge, ear pain and nosebleeds.    Eyes: Negative for blurred vision, double vision, photophobia and pain.   Respiratory: Negative for hemoptysis, sputum production and stridor.    Cardiovascular: Negative for chest pain, palpitations and orthopnea.   Gastrointestinal: Positive for abdominal pain, nausea and vomiting.   Genitourinary: Negative for dysuria, frequency, hematuria and urgency.   Musculoskeletal: Negative for myalgias and neck pain.   Skin: Negative for rash.   Neurological: Negative for tremors, sensory change, speech change, focal weakness, seizures, loss of consciousness and headaches.   Endo/Heme/Allergies: Negative for environmental allergies and polydipsia. Does not bruise/bleed easily.   Psychiatric/Behavioral: Negative for hallucinations.       Physical Exam   Constitutional: She is oriented to person, place, and time.   HENT:   Head: Normocephalic and atraumatic.   Right Ear: External ear normal.   Left Ear: External ear normal.   Nose: Nose normal.   Mouth/Throat: Oropharynx is clear and moist.   Eyes: Conjunctivae and EOM are normal. Pupils are equal, round, and reactive to light. Right eye exhibits no discharge. Left eye exhibits no discharge.   Neck: Neck supple. No JVD present. No tracheal deviation present.   Cardiovascular: Intact distal pulses.  Exam reveals no gallop and no friction rub.    Sinus rhythm   Pulmonary/Chest: No stridor. She has no wheezes. She has rales (rackles at the bases).   Abdominal: Soft. There  is tenderness (Mild ). There is no rebound.   Musculoskeletal: She exhibits no tenderness or deformity.   No clubbing or cyanosis   Neurological: She is oriented to person, place, and time.   Skin: Skin is warm and dry. No rash noted. She is not diaphoretic. No erythema.       PFSH:  No change.    Respiratory:     Pulse Oximetry: 97 %  CXR personally reviewed and compared to prior images  CXR with improved right lower lobe opacification compared to 1/20.  There is minimal left lower lobe subsegmental atelectasis.    HemoDynamics:  Pulse: (!) 54, Heart Rate (Monitored): 76  NIBP: 118/53        Recent Labs      04/27/18   1640   TROPONINI  <0.01   BNPBTYPENAT  74       Neuro:    Fluids:  Intake/Output       04/27/18 0700 - 04/28/18 0659 04/28/18 0700 - 04/29/18 0659 04/29/18 0700 - 04/30/18 0659      1251-1989 3881-7097 Total 8463-2652 4547-2121 Total 1441-4574 0157-0985 Total       Intake    P.O.  --  1000 1000  875  750 1625  --  -- --    P.O. -- 1000 1000  -- -- --    I.V.  --  4400 4400  1825  900 2725  --  -- --    IV Piggyback Volume (zosyn) -- 300 300 125 200 325 -- -- --    IV Piggyback Volume (IV Piggyback) -- 500 500 -- -- -- -- -- --    IV Volume (NS bolus) -- 2800 2800 -- -- -- -- -- --    IV Volume (NS maint) --  200 1400 -- -- --    IV Volume (Vancomycin) -- -- --  -- -- --    Total Intake -- 5400 5400 2700 1650 4350 -- -- --       Output    Urine  --  650 650  1400  850 2250  --  -- --    Number of Times Voided -- 3 x 3 x 3 x 1 x 4 x -- -- --    Urine Void (mL) (non-catheter) --  850 2250 -- -- --    Emesis  --  -- --  --  -- --  --  -- --    Emesis - Number of Times -- -- -- 5 x -- 5 x -- -- --    Other  --  -- --  150  -- 150  --  -- --    Other -- -- -- 150 -- 150 -- -- --    Stool  --  -- --  --  -- --  --  -- --    Number of Times Stooled -- 0 x 0 x 0 x 0 x 0 x -- -- --    Total Output --  850 2400 -- -- --       Net I/O     -- 9237 9163  9016 634 9846 -- -- --           Recent Labs      18   0540   SODIUM  141  139  145   POTASSIUM  3.9  4.5  3.6   CHLORIDE  104  108  115*   CO2  28  23  23   BUN  14  16  13   CREATININE  1.27  1.19  0.91   MAGNESIUM   --   1.8   --    CALCIUM  8.7  7.7*  7.2*       GI/Nutrition:    Liver Function  Recent Labs      18   0540   ALTSGPT  13  13  9   ASTSGOT  19  17  11*   ALKPHOSPHAT  24*  34  15*   TBILIRUBIN  0.3  0.3  0.2   GLUCOSE  107*  333*  171*       Heme:  Recent Labs      18   0540   RBC  3.85*  4.39  3.94*   HEMOGLOBIN  11.1*  12.5  11.3*   HEMATOCRIT  35.1*  40.4  35.7*   PLATELETCT  76*  59*  74*   PROTHROMBTM  12.9   --    --    APTT  27.8   --    --    INR  1.00   --    --        Infectious Disease:  Temp  Av.6 °C (97.8 °F)  Min: 36.4 °C (97.5 °F)  Max: 36.7 °C (98.1 °F)    Recent Labs      18   0540   WBC  2.9*  3.0*  5.4   NEUTSPOLYS  66.00  87.10*  80.40*   LYMPHOCYTES  24.20  11.00*  13.80*   MONOCYTES  6.80  1.30  5.20   EOSINOPHILS  2.00  0.00  0.00   BASOPHILS  0.70  0.30  0.20   ASTSGOT  19  17  11*   ALTSGPT  13  13  9   ALKPHOSPHAT  24*  34  15*   TBILIRUBIN  0.3  0.3  0.2     Current Facility-Administered Medications   Medication Dose Frequency Provider Last Rate Last Dose   • vancomycin 1,400 mg in  mL IVPB  1,400 mg Q12HR Jonn Gardiner M.D.   Stopped at 18 2211   • tiotropium (SPIRIVA) 18 MCG inhalation capsule 1 Cap  1 Cap DAILY Jeremy M Gonda, M.D.   1 Cap at 18 0846   • methylnaltrexone (RELISTOR) injection 12 mg  12 mg DAILY Josemanuel Real M.D.   12 mg at 18 0539   • midodrine (PROAMATINE) tablet 5 mg  5 mg TID WITH MEALS Josemanuel Real M.D.   5 mg at 18 0535   • ALPRAZolam (XANAX) tablet 1 mg  1 mg TID Jonn Gardiner M.D.   1 mg at 18 0535   • aspirin EC (ECOTRIN) tablet 81 mg  81  mg DAILY Jonn Gardiner M.D.   81 mg at 04/28/18 0844   • docusate sodium (COLACE) capsule 100 mg  100 mg BID Jonn Gardiner M.D.   100 mg at 04/28/18 2001   • ferrous sulfate (FEOSOL) 220 (44 Fe) MG/5ML solution 220 mg  220 mg DAILY Jonn Gardiner M.D.   220 mg at 04/29/18 0758   • gabapentin (NEURONTIN) capsule 600 mg  600 mg TID Jonn Gardiner M.D.   600 mg at 04/28/18 2002   • levothyroxine (SYNTHROID) tablet 137 mcg  137 mcg AM ES Jonn Gardiner M.D.   137 mcg at 04/29/18 0535   • mycophenolate (CELLCEPT) capsule 500 mg  500 mg BID Jonn Gardiner M.D.   500 mg at 04/29/18 0751   • omeprazole (PRILOSEC) capsule 40 mg  40 mg DAILY Jonn Gardiner M.D.   40 mg at 04/28/18 0844   • pravastatin (PRAVACHOL) tablet 20 mg  20 mg QHS Jonn Gardiner M.D.   20 mg at 04/28/18 2005   • sertraline (ZOLOFT) tablet 100 mg  100 mg QHS Jonn Gardiner M.D.   100 mg at 04/28/18 2005   • tacrolimus (PROGRAF) capsule 1.5 mg  1.5 mg BID Jonn Gardiner M.D.   1.5 mg at 04/29/18 0752   • Tadalafil (PAH) TABS 40 mg  40 mg QHS Jonn Gardiner M.D.   40 mg at 04/28/18 2100   • traZODone (DESYREL) tablet 100 mg  100 mg Nightly Jonn Gardiner M.D.   100 mg at 04/28/18 2012   • NS infusion 2,262 mL  30 mL/kg Once PRN Jonn Gardiner M.D.       • Respiratory Care per Protocol   Continuous RT Jonn Gardiner M.D.       • piperacillin-tazobactam (ZOSYN) 4.5 g in  mL IVPB  4.5 g Q8HRS Jonn Gardiner M.D. 25 mL/hr at 04/29/18 0536 4.5 g at 04/29/18 0536   • MD ALERT... vancomycin per pharmacy protocol   pharmacy to dose Jonn Gardiner M.D.       • ondansetron (ZOFRAN) syringe/vial injection 4 mg  4 mg Q4HRS PRN Jonn Gardiner M.D.   4 mg at 04/28/18 1709   • ondansetron (ZOFRAN ODT) dispertab 4 mg  4 mg Q4HRS PRN Jonn Gardiner M.D.       • hydrocortisone sodium succinate PF (SOLU-CORTEF) 100 MG injection 100 mg  100 mg Q8HRS Jonn Gardiner M.D.   100 mg at 04/29/18 0536   • ambrisentan  (LETAIRIS) TABS 10 mg  10 mg DAILY Jonn Gardiner M.D.   10 mg at 04/28/18 0900   • polyethylene glycol/lytes (MIRALAX) PACKET 1 Packet  1 Packet DAILY Jonn Gardiner M.D.       • oyster shell calcium/vitamin D 250-125 MG-UNIT tablet 1 Tab  1 Tab BID WITH MEALS Jaime Chan Jr. D.O.   1 Tab at 04/28/18 1643   • ascorbic acid tablet 500 mg  500 mg DAILY Jaime Chan Jr., D.O.   500 mg at 04/28/18 0844   • oxymetazoline (AFRIN) 0.05 % nasal spray 2 Spray  2 Spray QHS DEMETRIS Massey Jr..O.   2 Tensed at 04/28/18 2005   • morphine 10 MG/5ML solution 15 mg  15 mg Q6HRS Jaime Chan Jr. D.O.        Or   • morphine 10 MG/5ML solution 20 mg  20 mg Q6HRS Jaime Chan Jr. D.O.       • HYDROXYPROPYL METHYLCELLULOSE 0.3 % GEL 1 Application  1 Application QHS Jaime Chan Jr. D.O.   1 Application at 04/28/18 2001   • morphine (pf) 4 mg/ml injection 4 mg  4 mg Q4HRS PRN Jaime Chan Jr. D.O.   4 mg at 04/29/18 0923   • Linaclotide CAPS 290 mcg  290 mcg DAILY Jaime Chan Jr. D.O.   290 mcg at 04/28/18 0900     Last reviewed on 4/27/2018  5:30 PM by Colleen Walls    Quality  Measures:  Labs reviewed, Medications reviewed and Radiology images reviewed                      Assessment and Plan:    Unspecified hypotension   - Query sepsis versus adrenal insufficiency   - Continue intravenous fluids, stress dose steroid    Query sepsis   - Continue empiric antibiotics, Zosyn and vancomycin day 3   - Influenza screen negative   - Urinalysis unremarkable   - We'll discuss with ID  Abdominal pain   - History of chronic left upper quadrant pain due to splenomegaly per patient   - Abdominal ultrasound with splenomegaly   - No free fluid noted on abdominal ultrasound    Acute on chronic hypoxemic respiratory failure   - Continue oxygen   - Uses 4 L NC at home    History of liver transplant with chronic immunosuppression   - Continue stress dose of hydrocortisone, 100 mg IV every 8 hours   -  Continue current dose of mycophenolate and tacrolimus    Pulmonary hypertension   - Continue current dose of ambrisentan and tadalafil    Sarcoidosis   - Continue bronchodilators    Chronic pain   - Continue analgesia    Likely okay to move out of ICU today.

## 2018-04-29 NOTE — PROGRESS NOTES
"Pharmacy Kinetics 58 y.o. female on vancomycin day # 3 2018    Currently on Vancomycin 1400 mg iv q12hr    Indication for Treatment: empiric for sepsis    Pertinent history per medical record: Admitted on 2018 for various complaints, including fatigue, NV, weakness, headache.  Patient with a complex PMH including liver xsplant on immunosuppression, adrenal insufficiency, recurrent aspiration pneumonia, recent Evon-en-Y. Empiric antibiotics have been ordered for sepsis with unclear source of infection.  Has been followed in the past by [facility] infectious disease.    Other antibiotics:Zosyn 4.5 g q 8 hrs    Allergies: Rhubarb; Organic nitrates; and Vancomycin hcl (Vanco allergy not a true allergy, infusion related)     List concerns for renal function: Hepatic disease/transplant, SCr elevated from baseline, low albumin    Pertinent cultures to date:   18 blood, peripheral - no growth to date  18 urine - no growth to date    Recent Labs      18   1640  18   0420  18   0540   WBC  2.9*  3.0*  5.4   NEUTSPOLYS  66.00  87.10*  80.40*     Recent Labs      18   1640  18   0420  18   0540   BUN  14  16  13   CREATININE  1.27  1.19  0.91   ALBUMIN  4.0  3.8  3.0*     No results for input(s): VANCOTROUGH, VANCOPEAK, VANCORANDOM in the last 72 hours.  Intake/Output Summary (Last 24 hours) at 18 1338  Last data filed at 18 0800   Gross per 24 hour   Intake             2720 ml   Output             1650 ml   Net             1070 ml      Blood pressure 126/59, pulse 60, temperature 36.7 °C (98 °F), resp. rate (!) 22, height 1.727 m (5' 8\"), weight 76.5 kg (168 lb 10.4 oz), SpO2 96 %, not currently breastfeeding. Temp (24hrs), Av.6 °C (97.9 °F), Min:36.4 °C (97.6 °F), Max:36.7 °C (98.1 °F)      A/P   1. Vancomycin dose change: continue current dosing  2. Next vancomycin level: 0930 today (missed) will reorder trough level  3. Goal trough: 16-20 " mcg/mL  4. Comments: Some risks for Vanco accumulation, trough level missed that was ordered today. Will retime Vanco trough with tomorrow morning's dose. Anticipate antibiotics to be de-escalated soon if cultures remain negative and PNA not suspected.     Sherri Claudio, PharmD

## 2018-04-30 VITALS
OXYGEN SATURATION: 95 % | DIASTOLIC BLOOD PRESSURE: 59 MMHG | HEIGHT: 68 IN | RESPIRATION RATE: 13 BRPM | HEART RATE: 57 BPM | SYSTOLIC BLOOD PRESSURE: 126 MMHG | TEMPERATURE: 97.5 F | BODY MASS INDEX: 24.79 KG/M2 | WEIGHT: 163.58 LBS

## 2018-04-30 LAB
ALBUMIN SERPL BCP-MCNC: 2.6 G/DL (ref 3.2–4.9)
ALBUMIN/GLOB SERPL: 1.5 G/DL
ALP SERPL-CCNC: 13 U/L (ref 30–99)
ALT SERPL-CCNC: 9 U/L (ref 2–50)
ANION GAP SERPL CALC-SCNC: 7 MMOL/L (ref 0–11.9)
AST SERPL-CCNC: 11 U/L (ref 12–45)
BASOPHILS # BLD AUTO: 0.3 % (ref 0–1.8)
BASOPHILS # BLD: 0.01 K/UL (ref 0–0.12)
BILIRUB SERPL-MCNC: 0.2 MG/DL (ref 0.1–1.5)
BUN SERPL-MCNC: 10 MG/DL (ref 8–22)
CALCIUM SERPL-MCNC: 6.9 MG/DL (ref 8.5–10.5)
CHLORIDE SERPL-SCNC: 115 MMOL/L (ref 96–112)
CO2 SERPL-SCNC: 23 MMOL/L (ref 20–33)
CREAT SERPL-MCNC: 0.74 MG/DL (ref 0.5–1.4)
EOSINOPHIL # BLD AUTO: 0.06 K/UL (ref 0–0.51)
EOSINOPHIL NFR BLD: 1.5 % (ref 0–6.9)
ERYTHROCYTE [DISTWIDTH] IN BLOOD BY AUTOMATED COUNT: 46.6 FL (ref 35.9–50)
GLOBULIN SER CALC-MCNC: 1.7 G/DL (ref 1.9–3.5)
GLUCOSE SERPL-MCNC: 153 MG/DL (ref 65–99)
HCT VFR BLD AUTO: 33.1 % (ref 37–47)
HGB BLD-MCNC: 10.7 G/DL (ref 12–16)
IMM GRANULOCYTES # BLD AUTO: 0.01 K/UL (ref 0–0.11)
IMM GRANULOCYTES NFR BLD AUTO: 0.3 % (ref 0–0.9)
LYMPHOCYTES # BLD AUTO: 0.89 K/UL (ref 1–4.8)
LYMPHOCYTES NFR BLD: 22.7 % (ref 22–41)
MCH RBC QN AUTO: 29.3 PG (ref 27–33)
MCHC RBC AUTO-ENTMCNC: 32.3 G/DL (ref 33.6–35)
MCV RBC AUTO: 90.7 FL (ref 81.4–97.8)
MONOCYTES # BLD AUTO: 0.26 K/UL (ref 0–0.85)
MONOCYTES NFR BLD AUTO: 6.6 % (ref 0–13.4)
NEUTROPHILS # BLD AUTO: 2.69 K/UL (ref 2–7.15)
NEUTROPHILS NFR BLD: 68.6 % (ref 44–72)
NRBC # BLD AUTO: 0 K/UL
NRBC BLD-RTO: 0 /100 WBC
PLATELET # BLD AUTO: 61 K/UL (ref 164–446)
PMV BLD AUTO: 10.3 FL (ref 9–12.9)
POTASSIUM SERPL-SCNC: 3.5 MMOL/L (ref 3.6–5.5)
PROT SERPL-MCNC: 4.3 G/DL (ref 6–8.2)
RBC # BLD AUTO: 3.65 M/UL (ref 4.2–5.4)
SODIUM SERPL-SCNC: 145 MMOL/L (ref 135–145)
WBC # BLD AUTO: 3.9 K/UL (ref 4.8–10.8)

## 2018-04-30 PROCEDURE — A9270 NON-COVERED ITEM OR SERVICE: HCPCS | Performed by: INTERNAL MEDICINE

## 2018-04-30 PROCEDURE — 700102 HCHG RX REV CODE 250 W/ 637 OVERRIDE(OP): Performed by: INTERNAL MEDICINE

## 2018-04-30 PROCEDURE — 700111 HCHG RX REV CODE 636 W/ 250 OVERRIDE (IP): Performed by: INTERNAL MEDICINE

## 2018-04-30 PROCEDURE — 99239 HOSP IP/OBS DSCHRG MGMT >30: CPT | Performed by: HOSPITALIST

## 2018-04-30 PROCEDURE — 85025 COMPLETE CBC W/AUTO DIFF WBC: CPT

## 2018-04-30 PROCEDURE — A9270 NON-COVERED ITEM OR SERVICE: HCPCS | Performed by: HOSPITALIST

## 2018-04-30 PROCEDURE — 80053 COMPREHEN METABOLIC PANEL: CPT

## 2018-04-30 PROCEDURE — 700112 HCHG RX REV CODE 229: Performed by: INTERNAL MEDICINE

## 2018-04-30 PROCEDURE — 700105 HCHG RX REV CODE 258: Performed by: INTERNAL MEDICINE

## 2018-04-30 PROCEDURE — 700102 HCHG RX REV CODE 250 W/ 637 OVERRIDE(OP): Performed by: HOSPITALIST

## 2018-04-30 PROCEDURE — 700111 HCHG RX REV CODE 636 W/ 250 OVERRIDE (IP): Performed by: HOSPITALIST

## 2018-04-30 RX ORDER — MIDODRINE HYDROCHLORIDE 5 MG/1
5 TABLET ORAL
Qty: 90 TAB | Refills: 1 | Status: SHIPPED | OUTPATIENT
Start: 2018-04-30 | End: 2018-04-30

## 2018-04-30 RX ORDER — MIDODRINE HYDROCHLORIDE 5 MG/1
5 TABLET ORAL
Qty: 90 TAB | Refills: 1 | Status: SHIPPED | OUTPATIENT
Start: 2018-04-30 | End: 2018-05-29

## 2018-04-30 RX ADMIN — Medication 220 MG: at 07:38

## 2018-04-30 RX ADMIN — METHYLNALTREXONE BROMIDE 12 MG: 12 INJECTION, SOLUTION SUBCUTANEOUS at 05:00

## 2018-04-30 RX ADMIN — LEVOTHYROXINE SODIUM 137 MCG: 137 TABLET ORAL at 05:00

## 2018-04-30 RX ADMIN — MIDODRINE HYDROCHLORIDE 5 MG: 5 TABLET ORAL at 11:33

## 2018-04-30 RX ADMIN — TIOTROPIUM BROMIDE 1 CAPSULE: 18 CAPSULE ORAL; RESPIRATORY (INHALATION) at 07:39

## 2018-04-30 RX ADMIN — AMBRISENTAN 10 MG: 10 TABLET, FILM COATED ORAL at 09:00

## 2018-04-30 RX ADMIN — MORPHINE SULFATE 4 MG: 4 INJECTION INTRAVENOUS at 07:40

## 2018-04-30 RX ADMIN — MIDODRINE HYDROCHLORIDE 5 MG: 5 TABLET ORAL at 06:30

## 2018-04-30 RX ADMIN — MYCOPHENOLATE MOFETIL 500 MG: 250 CAPSULE ORAL at 05:00

## 2018-04-30 RX ADMIN — PIPERACILLIN AND TAZOBACTAM 4.5 G: 4; .5 INJECTION, POWDER, LYOPHILIZED, FOR SOLUTION INTRAVENOUS; PARENTERAL at 04:59

## 2018-04-30 RX ADMIN — MORPHINE SULFATE 4 MG: 4 INJECTION INTRAVENOUS at 11:33

## 2018-04-30 RX ADMIN — DOCUSATE SODIUM 100 MG: 100 CAPSULE ORAL at 07:37

## 2018-04-30 RX ADMIN — TACROLIMUS 1.5 MG: 1 CAPSULE ORAL at 05:00

## 2018-04-30 RX ADMIN — ALPRAZOLAM 1 MG: 0.25 TABLET ORAL at 05:00

## 2018-04-30 RX ADMIN — MORPHINE SULFATE 4 MG: 4 INJECTION INTRAVENOUS at 03:13

## 2018-04-30 RX ADMIN — VANCOMYCIN HYDROCHLORIDE 1400 MG: 100 INJECTION, POWDER, LYOPHILIZED, FOR SOLUTION INTRAVENOUS at 10:55

## 2018-04-30 RX ADMIN — ASPIRIN 81 MG: 81 TABLET, COATED ORAL at 07:38

## 2018-04-30 RX ADMIN — OMEPRAZOLE 40 MG: 20 CAPSULE, DELAYED RELEASE ORAL at 07:37

## 2018-04-30 RX ADMIN — GABAPENTIN 600 MG: 300 CAPSULE ORAL at 07:36

## 2018-04-30 RX ADMIN — PREDNISONE 7 MG: 1 TABLET ORAL at 07:38

## 2018-04-30 RX ADMIN — CALCIUM CARBONATE-CHOLECALCIFEROL TAB 250 MG-125 UNIT 1 TABLET: 250-125 TAB at 07:38

## 2018-04-30 RX ADMIN — OXYCODONE HYDROCHLORIDE AND ACETAMINOPHEN 500 MG: 500 TABLET ORAL at 07:38

## 2018-04-30 ASSESSMENT — PAIN SCALES - GENERAL
PAINLEVEL_OUTOF10: 4
PAINLEVEL_OUTOF10: 8
PAINLEVEL_OUTOF10: 6
PAINLEVEL_OUTOF10: 5
PAINLEVEL_OUTOF10: 6
PAINLEVEL_OUTOF10: 7
PAINLEVEL_OUTOF10: 8
PAINLEVEL_OUTOF10: 4
PAINLEVEL_OUTOF10: 8

## 2018-04-30 NOTE — PROGRESS NOTES
Late Entry    0300 Pt called RN to bedside. Pt attitude completely changed. Pt no longer subdued. All requests and needs fulfilled at this time. RN made sure the call light is within reach, bed in lowest position, side rails up, bed alarm on, all pt belongings within reach, and to call if she has any further requests.    0645 Bedside report given to Skyla FRANCIS. Pt still remains in attitude prior to event previous in evening. Charge RN notified. Manager Arcelia FRANCIS notified of shift events as well.

## 2018-04-30 NOTE — DISCHARGE SUMMARY
CHIEF COMPLAINT ON ADMISSION  Chief Complaint   Patient presents with   • Flank Pain       CODE STATUS  Full Code    HPI & HOSPITAL COURSE  This is a 58 y.o. female here with dominant and flank pain.  Ms. Cuba has a history of sarcoidosis and liver transplant on chronic Prograf and Cellcept that has been having fatigue and weakness for the past 2 days prior to admission. She has had an associated headache as well. Her chronic diarrhea is unchanged.   She had been hypotensive and was given the appropriate IV saline boluses and admitted to the cardiac intensive care unit. She is on her home 5 liters oxygen. She noted that her spleen hurts worse than usual. Despite fluids and steroids her SBP had been in the 80's   She was given IV hydrocortisone and started on midodrine. Her blood pressure has rebounded well.   As she is immunocompromised in the setting of a liver transplant, was continued on her Prograf and CellCept. She was initiated on IV Zosyn and vancomycin. Today, her blood pressure has improved and steroids had been greased back to her baseline prednisone 7 mg daily. Blood and urine cultures are negative. CMV and EBV are pending.    Is been given a prescription for Midodrine 5 mg 3 times a day as can be adjusted either up or down discretion of her primary care provider, Dr. Phillip, and patient will keep a record of her blood pressures.  The patient met 2-midnight criteria for an inpatient stay at the time of discharge.    Therefore, she is discharged in good and stable condition with close outpatient follow-up.    SPECIFIC OUTPATIENT FOLLOW-UP  Call Dr. Villegas's office for an appointment. Dr. Quintero has called the office and they are aware that she will call for an appointment.     DISCHARGE PROBLEM LIST  Principal Problem:    Sepsis (HCC) POA: Yes  Active Problems:    Pancytopenia (HCC) POA: Yes    Chronic respiratory failure with hypoxia (HCC) POA: Yes    Immunocompromised state (HCC) POA: Yes    Status  post liver transplant Dr. Canada ([facility]) POA: Yes      Overview: -December 2011: Status post liver transplant for end stage liver disease       [place] [facility] [place].          Sarcoidosis (HCC) POA: Yes    Pulmonary hypertension (HCC) POA: Yes      Overview: Initial evaluation at [facility] pre liver transplant, PFO closed w/o       complication, 1/25/2011, then worse PAH and R HF post transplant,       [place] [facility] [place], responding       to tadalafil and ambrisentan dramatically. Followed there with serial R       heart cath.      3/25/2014: Most recent R heart cath done Dr. Brenda Flores with mild       pulmonary hypertension and relatively high ouput      November 2014: Echocardiogram with normal LV size, LVEF 60-65%. Mild MR,       mild AI, mild TR, RVSP 29-34mmHg.      February 2015: Echocardiogram with mild concentric LVH, LVEF 65-70%. Mild       MR, mild AI, trace TR, RVSP 16mmHg.    Splenomegaly POA: Yes    GERD (gastroesophageal reflux disease) POA: Yes    Thrombocytopenia (HCC) POA: Yes  Resolved Problems:    Mood disorder (HCC) POA: Yes      FOLLOW UP  Future Appointments  Date Time Provider Department Center   5/9/2018 8:00 AM [facility] None   5/29/2018 10:00 AM Elisa Phillip M.D. [facility] [place]   6/6/2018 8:00 AM [facility] None   7/11/2018 8:00 AM [facility] None   8/10/2018 8:00 AM [facility] None   8/28/2018 10:00 AM Maxwell Phan M.D. [facility] None   9/5/2018 8:00 AM [facility] None   9/6/2018 10:30 AM [facility] None     No follow-up provider specified.    MEDICATIONS ON DISCHARGE   Bereket Roxana   Home Medication Instructions MORELIA:[id number]    Printed on:04/30/18 1121   Medication Information                      ALPRAZolam (XANAX) 1 MG Tab  Take 1 Tab by mouth 3 times a day for 90 days.             ambrisentan (LETAIRIS) 10 MG tablet  Take 1 Tab by mouth every  day.             ascorbic acid (ASCORBIC ACID) 500 MG Tab  Take 500 mg by mouth every day.             aspirin 81 MG tablet  Take 1 Tab by mouth every day.             bisacodyl (DULCOLAX) 10 MG Suppos  Insert 1 Suppository in rectum 1 time daily as needed (constipation).             Calcium Citrate-Vitamin D 315-250 MG-UNIT Tab  Take 1 Tab by mouth 2 Times a Day.             Carboxymethylcell-Hypromellose (GENTEAL) 0.25-0.3 % Gel  Place 0.3 mg in both eyes every bedtime.             docusate sodium (COLACE) 100 MG Cap  Take 100 mg by mouth 2 times a day.             ferrous sulfate (FEOSOL) 220 (44 FE) MG/5ML Elixir  Take 5 mL by mouth every day.             furosemide (LASIX) 40 MG Tab  TAKE 1 TAB BY MOUTH EVERY DAY.             gabapentin (NEURONTIN) 600 MG tablet  Take 1 Tab by mouth 3 times a day.             lactulose 10 GM/15ML Solution  Take 20 g by mouth every day.             levothyroxine (SYNTHROID) 137 MCG Tab  Take 1 Tab by mouth Every morning on an empty stomach.             Linaclotide (LINZESS) 290 MCG Cap  Take 1 Cap by mouth every day.             methylnaltrexone (RELISTOR) 12 MG/0.6ML Solution  Inject 12 mg as instructed Once.             midodrine (PROAMATINE) 5 MG Tab  Take 1 Tab by mouth 3 times a day, with meals.             morphine 10 MG/5ML Solution  Take 15-20 mg by mouth every 6 hours. Scheduled             mycophenolate (CELLCEPT) 250 MG Cap  Take 500 mg by mouth 2 times a day.             NON SPECIFIED  Take 1 Cap by mouth every evening. Bariatric Dietary Supplment              omeprazole (PRILOSEC) 40 MG delayed-release capsule  Take 1 Cap by mouth every day.             ondansetron (ZOFRAN ODT) 4 MG TABLET DISPERSIBLE  Take 1 Tab by mouth every 8 hours as needed for Nausea/Vomiting. Nausea and vomiting             Oxymetazoline HCl (AFRIN NASAL SPRAY NA)  Spray 1-2 Sprays in nose every bedtime.             polyethylene glycol 3350 (MIRALAX) Powder  Take 17 g by mouth every day.              pravastatin (PRAVACHOL) 20 MG Tab  TAKE 1 TAB BY MOUTH EVERY DAY.             predniSONE (DELTASONE) 1 MG Tab  Take 7 mg by mouth every day. Pt uses a 5 mg and 2- 1 mg tabs to equal a daily dose of 7 mg             sertraline (ZOLOFT) 100 MG Tab  Take 1 Tab by mouth every bedtime.             SPIRIVA HANDIHALER 18 MCG Cap  INHALE 1 CAP BY MOUTH DAILY.             tacrolimus (PROGRAF) 0.5 MG Cap  Take 1.5 mg by mouth 2 Times a Day. Uses a 1 mg and a 0.5 mg tab to equal a             Tadalafil, PAH, (ADCIRCA) 20 MG Tab  Take 40 mg by mouth every bedtime. Indications: Pulmonary Arterial Hypertension             traZODone (DESYREL) 100 MG Tab  Take 100 mg by mouth every evening.             Wheat Dextrin (BENEFIBER) Powder  Take 1 Dose by mouth every day.                 DIET  Orders Placed This Encounter   Procedures   • DIET ORDER     Standing Status:   Standing     Number of Occurrences:   1     Order Specific Question:   Diet:     Answer:   Regular [1]       ACTIVITY  As tolerated.        CONSULTATIONS  Critical care    PROCEDURES  none    LABORATORY  Lab Results   Component Value Date/Time    SODIUM 145 04/30/2018 03:20 AM    POTASSIUM 3.5 (L) 04/30/2018 03:20 AM    CHLORIDE 115 (H) 04/30/2018 03:20 AM    CO2 23 04/30/2018 03:20 AM    GLUCOSE 153 (H) 04/30/2018 03:20 AM    BUN 10 04/30/2018 03:20 AM    CREATININE 0.74 04/30/2018 03:20 AM        Lab Results   Component Value Date/Time    WBC 3.9 (L) 04/30/2018 03:20 AM    HEMOGLOBIN 10.7 (L) 04/30/2018 03:20 AM    HEMATOCRIT 33.1 (L) 04/30/2018 03:20 AM    PLATELETCT 61 (L) 04/30/2018 03:20 AM    Blood cultures and urine cultures are negative.  Influenza negative.  Free T4 1.12 TSH 0.230  CMV and EBV are pending.  Total time of the discharge process exceeds 32 minutes

## 2018-04-30 NOTE — DISCHARGE INSTRUCTIONS
Discharge Instructions    Discharged to home by car with relative. Discharged via wheelchair, hospital escort: Yes.  Special equipment needed: Not Applicable    Be sure to schedule a follow-up appointment with your primary care doctor or any specialists as instructed.     Discharge Plan:   Influenza Vaccine Indication: Not indicated: Previously immunized this influenza season and > 8 years of age    I understand that a diet low in cholesterol, fat, and sodium is recommended for good health. Unless I have been given specific instructions below for another diet, I accept this instruction as my diet prescription.   Other diet: regular    Special Instructions: Sepsis, Adult  Sepsis is a serious infection of your blood or tissues that affects your whole body. The infection that causes sepsis may be bacterial, viral, fungal, or parasitic. Sepsis may be life threatening. Sepsis can cause your blood pressure to drop. This may result in shock. Shock causes your central nervous system and your organs to stop working correctly.  What increases the risk?  Sepsis can happen in anyone, but it is more likely to happen in people who have weakened immune systems.  What are the signs or symptoms?  Symptoms of sepsis can include:  · Fever or low body temperature (hypothermia).  · Rapid breathing (hyperventilation).  · Chills.  · Rapid heartbeat (tachycardia).  · Confusion or light-headedness.  · Trouble breathing.  · Urinating much less than usual.  · Cool, clammy skin or red, flushed skin.  · Other problems with the heart, kidneys, or brain.  How is this diagnosed?  Your health care provider will likely do tests to look for an infection, to see if the infection has spread to your blood, and to see how serious your condition is. Tests can include:  · Blood tests, including cultures of your blood.  · Cultures of other fluids from your body, such as:  ¨ Urine.  ¨ Pus from wounds.  ¨ Mucus coughed up from your lungs.  · Urine tests other  than cultures.  · X-ray exams or other imaging tests.  How is this treated?  Treatment will begin with elimination of the source of infection. If your sepsis is likely caused by a bacterial or fungal infection, you will be given antibiotic or antifungal medicines.  You may also receive:  · Oxygen.  · Fluids through an IV tube.  · Medicines to increase your blood pressure.  · A machine to clean your blood (dialysis) if your kidneys fail.  · A machine to help you breathe if your lungs fail.  Get help right away if:  You get an infection or develop any of the signs and symptoms of sepsis after surgery or a hospitalization.  This information is not intended to replace advice given to you by your health care provider. Make sure you discuss any questions you have with your health care provider.  Document Released: 09/15/2004 Document Revised: 05/25/2017 Document Reviewed: 08/25/2014  [employer] Interactive Patient Education © 2017 [employer]      · Is patient discharged on Warfarin / Coumadin?   No     Depression / Suicide Risk    As you are discharged from this [facility] facility, it is important to learn how to keep safe from harming yourself.    Recognize the warning signs:  · Abrupt changes in personality, positive or negative- including increase in energy   · Giving away possessions  · Change in eating patterns- significant weight changes-  positive or negative  · Change in sleeping patterns- unable to sleep or sleeping all the time   · Unwillingness or inability to communicate  · Depression  · Unusual sadness, discouragement and loneliness  · Talk of wanting to die  · Neglect of personal appearance   · Rebelliousness- reckless behavior  · Withdrawal from people/activities they love  · Confusion- inability to concentrate     If you or a loved one observes any of these behaviors or has concerns about self-harm, here's what you can do:  · Talk about it- your feelings and reasons for harming yourself  · Remove any  means that you might use to hurt yourself (examples: pills, rope, extension cords, firearm)  · Get professional help from the community (Mental Health, Substance Abuse, psychological counseling)  · Do not be alone:Call your Safe Contact- someone whom you trust who will be there for you.  · Call your local CRISIS HOTLINE 644-7403 or 903-198-6537  · Call your local Children's Mobile Crisis Response Team [place] (926) 319-4587 or wwwResy Network  · Call the toll free National Suicide Prevention Hotlines   · National Suicide Prevention Lifeline 513-540-KXCB (4722)  · National Hope Line Network 800-SUICIDE (220-3342)      Sepsis, Adult  Sepsis is a serious infection of your blood or tissues that affects your whole body. The infection that causes sepsis may be bacterial, viral, fungal, or parasitic. Sepsis may be life threatening. Sepsis can cause your blood pressure to drop. This may result in shock. Shock causes your central nervous system and your organs to stop working correctly.  What increases the risk?  Sepsis can happen in anyone, but it is more likely to happen in people who have weakened immune systems.  What are the signs or symptoms?  Symptoms of sepsis can include:  · Fever or low body temperature (hypothermia).  · Rapid breathing (hyperventilation).  · Chills.  · Rapid heartbeat (tachycardia).  · Confusion or light-headedness.  · Trouble breathing.  · Urinating much less than usual.  · Cool, clammy skin or red, flushed skin.  · Other problems with the heart, kidneys, or brain.  How is this diagnosed?  Your health care provider will likely do tests to look for an infection, to see if the infection has spread to your blood, and to see how serious your condition is. Tests can include:  · Blood tests, including cultures of your blood.  · Cultures of other fluids from your body, such as:  ¨ Urine.  ¨ Pus from wounds.  ¨ Mucus coughed up from your lungs.  · Urine tests other than cultures.  · X-ray  exams or other imaging tests.  How is this treated?  Treatment will begin with elimination of the source of infection. If your sepsis is likely caused by a bacterial or fungal infection, you will be given antibiotic or antifungal medicines.  You may also receive:  · Oxygen.  · Fluids through an IV tube.  · Medicines to increase your blood pressure.  · A machine to clean your blood (dialysis) if your kidneys fail.  · A machine to help you breathe if your lungs fail.  Get help right away if:  You get an infection or develop any of the signs and symptoms of sepsis after surgery or a hospitalization.  This information is not intended to replace advice given to you by your health care provider. Make sure you discuss any questions you have with your health care provider.  Document Released: 09/15/2004 Document Revised: 05/25/2017 Document Reviewed: 08/25/2014  [employer] Interactive Patient Education © 2017 [employer]    Pulmonary Hypertension  Pulmonary hypertension is high blood pressure within the arteries in your lungs (pulmonary arteries). It is different than having high blood pressure elsewhere in your body, such as blood pressure that is measured with a blood pressure cuff. Pulmonary hypertension makes it harder for blood to flow through the lungs. As a result, the heart must work harder to pump blood through the lungs, and it may be harder for you to breathe. Over time, this can weaken the heart muscle. Pulmonary hypertension is a serious condition and it can be fatal.  What are the causes?  Many different medical conditions can cause pulmonary hypertension. Pulmonary hypertension can be categorized by cause into five groups:  Group 1   Pulmonary hypertension that is caused by abnormal growth of small blood vessels in the lungs (pulmonary arterial hypertension). The abnormal blood vessel growth may have no known cause, or it may be:  · Passed along from a parent (hereditary).  · Caused by another disease, such  as a connective tissue disease (including lupus or scleroderma) or HIV.  · Caused by certain drugs or toxins.  Group 2   Pulmonary hypertension that is caused by weakness of the main chamber of the heart (left ventricle) or heart valve disease.  Group 3   Pulmonary hypertension that is caused by lung disease or low oxygen levels. Causes in this group include:  · Emphysema or chronic obstructive pulmonary disease (COPD).  · Untreated sleep apnea.  · Pulmonary fibrosis.  Group 4   Pulmonary hypertension that is caused by blood clots in the lungs (pulmonary emboli).  Group 5   Other causes of pulmonary hypertension, such as sickle cell anemia, or a mix of multiple causes.  What are the signs or symptoms?  Symptoms of this condition include:  · Shortness of breath. You may notice shortness of breath with:  ¨ Activity, such as walking.  ¨ No activity.  · Tiredness and fatigue.  · Dizziness or fainting.  · Rapid heartbeat or feeling your heart flutter or skip a beat (palpitations).  · Neck vein enlargement.  · Bluish color to your lips and fingertips.  How is this diagnosed?  This condition may be diagnosed by:  · Chest X-ray.  · Arterial blood gases. This test checks the acidity of your blood as well as your blood oxygen and carbon dioxide levels.  · CT scan. This test can provide detailed images of your lungs.  · Pulmonary function test. This test measures how much air your lungs can hold. It also tests how well air moves in and out of your lungs.  · Electrocardiogram (ECG). This test traces the electrical activity of your heart.  · Echocardiogram. This test is used to look at your heart in motion and check how it is functioning.  · Heart catheterization. This test can measure the pressure in your pulmonary artery and the right side of your heart.  · Lung biopsy. This procedure involves checking a sample of lung tissue to find underlying causes.  How is this treated?  There is no cure for pulmonary hypertension, but  treatment can help to relieve symptoms and slow the progress of the condition. Treatment can involve:  · Medicines, such as:  ¨ Blood pressure medicines.  ¨ Medicines to relax (dilate) the pulmonary blood vessels.  ¨ Water pills to get rid of extra fluid (diuretic medicines).  ¨ Blood-thinning medicines.  · Surgery. For severe pulmonary hypertension that does not respond to medical treatment, heart-lung or lung transplant may be needed.  Follow these instructions at home:  · Take medicines only as directed by your health care provider. These include over-the-counter medicines and prescription medicines. Take all medicines exactly as instructed. Do not change or stop medicines without first checking with your health care provider.  · Do not smoke. If you need help quitting, ask your health care provider.  · Eat a healthy diet.  · Limit your salt (sodium) intake to less than 2,300 mg per day.  · Stay as active as possible. Exercise as directed by your health care provider. Talk with your health care provider about what type of exercise is safe for you.  · Avoid high altitudes.  · Avoid hot tubs and saunas.  · Avoid becoming pregnant, if this applies. Talk with your health care provider about safe methods of birth control.  · Keep all follow-up visits as directed by your health care provider. This is important.  Get help right away if:  · You have severe shortness of breath.  · You develop chest pain or pressure in your chest.  · You cough up blood.  · You develop swelling of your feet or legs.  · You have a significant increase in weight within 1-2 days.  This information is not intended to replace advice given to you by your health care provider. Make sure you discuss any questions you have with your health care provider.  Document Released: 10/14/2008 Document Revised: 07/07/2017 Document Reviewed: 06/09/2014  [employer] Interactive Patient Education © 2017 [employer]

## 2018-04-30 NOTE — PROGRESS NOTES
"Late Entry    2030- Chiquita Greenwood primary RN, unit charge RN, and NAM asked that I am present during all interactions with this patient with Chiquita FRANCIS as a pranay. I agreed and went with Chiquita FRANCIS to pass medications and assess the patient. During this interaction we asked the patient how she felt and she stated, \"you know how you get so worked up and upset and it makes your body hurt. That's how I feel\". Morphine and xanax administered per pt request for comfort and pain relief. Pt toileted and back to bed with call light within reach, bed in lowest position, side rails up, bed alarm on, and all pt belongings within reach. All pt requests and needs completed at this time and pt verbalizes understanding of POC for the evening and to call if she has any requests until RN repeats rounds in an hour.    2100- Chiquita and I entered patient's room to interrogate an IV pump. During conversation with the patient asked if the plan was for discharge tomorrow, 4/30/18 and then she said \"don't let them send me to the floor. Just have them send me home from this room in the ICU. Because if they send me to another unit I will find any and every way possible to get sent home, so its just better to let me leave from here. I do not want to talk about this any more.\" Chiquita and I respected her wishes at this time and left pt's room per pt request with call light within reach, bed in lowest position, side rails up, bed alarm on, and all pt belongings within reach.     2130- Chiquita FRANCIS and I answered pt call light. Pt requesting her pain medication at this time. After this interaction, Chiquita FRANCIS asked that I step outside the room so that she could evaluate a SAD score/depression screening on this patient since she had developed rapport with the patient over the past 3 nights. I stepped outside the room off stage but remained within ear reach to joana the screening. During Chiquita's RN screening of the patient (see assessment flowsheets and " "her note) the patient stated \"I'm a 58 year-old woman and I'm not going to tell anyone my life because I know exactly what'll happen and who will come in here to talk to me\". Chiquita FRANCIS stated that she is trying to ensure the patient is safe and doing her job and the patient trailed off and said \"this is the third time I've had these side effects from the steroids. Just because I feel that way doesn't mean I'm going talk to anyone because I know what it means and I am not going to any psych payne to get locked up. I do not want a  or any other doctor to come in my room or even talk to me.\" Chiquita FRANCIS assured her that this screening is only to keep her safe while she remains an impatient on CICU and it is not to put her in a psych payne. Chiquita FRANCIS asked if she would like to speak with anyone else including the charge RN or on call PMA and the pt declined. Chiquita FRANCIS then asked the pt if she was still okay with myself and Chiquita FRANCIS to take care of her and be her nurse for the remainder of the shift and the pt stated, \"yes. I still think you are a good nurse and you're just doing the bullshit that [facility] does just to upset the pts because its your job. I feel like you betrayed my trust and I should not have even said anything in the first place.\" Chiquita FRANCIS again reassured her that she is just making sure that she remains safe and that she will respect her wishes to stop talking about the subject. Chiquita FRANCIS made sure the call light is within reach, bed in lowest position, side rails up, bed alarm on, all pt belongings within reach, and to call if she has any further requests until RN rounds in an hour.       "

## 2018-04-30 NOTE — PROGRESS NOTES
"Late Entry    2020- Pt told primary RN, \"The steroids make me feel like why even bother being here and doing all this. I'm tired of being sick all the time. I am not even living my life anymore. I've gone through so much and am not going to hurt myself or others though\" to myself, primary RN. I then asked is she was having any thoughts of hurting herself or others, which she declined. RN ensured safety of pt by ensuring room is close to nursing station, patient remains in hospital clothing, RN and pt discussed no self harm or elopement, all necessary equipment present, hourly room and pt checks in place, all potentially dangerous items removed from pt's room at this time. RN made sure the call light is within reach, bed in lowest position, side rails up, bed alarm on, all pt belongings within reach, and to call if she has any further requests until RN rounds in an hour.   RN escalated concerns of pt statement to charge RN.  Primary RN and charge RN to escalate to supervisor Skyla RN and PIETRO Strange pt statements about suing, comments about [facility] not meeting her health needs, and above statement. All team members in agreement with primary RN to have a second RN with primary RN during any interaction with pt. Angela FRANCIS in agreement to be witness for the shift so the pt would have consistency with RNs involved in her care. Pt notified and in agreement to allow Angela FRANCIS care for her with myself the primary RN.  MD paged to update above statement, threats of suing the prescriber of the steroids, and desire to be transferred to [place]. MD in agreement to repeat a SAD score and PHQ, see flowsheet. PMA to update day shift PMA of need for psych consult pending IP social service consult.  Upon screening questions, primary RN asked Shmuel RN to step out of the room but stay within ear distance to still witness the screening but not being visible since the primary RN had a rapport with pt from the previous two nights and " "thought pt would be more compliant with just primary RN present for screening. The primary RN asked the pt if she can ask her some screening questions to follow up with her comment and reassured the pt that the questions are for her safety. A witness for the questions with primary RN was offered and declined by the pt. Pt was compliant for the first three questions and then became hostile and noncompliant stating, \"I am a 58 year old woman and I can take care of myself. I don't have to tell anyone anything, not even my spouse or children. You are not going to lock me up in a psych payne or ship me off to [place]. I should have never said anything to you in the first place and I do not want to talk about this any longer. I feel like you betrayed my trust and this is another example of why [facility] is so terrible.\" RN acknowledged pt's frustrations and educated the pt again that this screening is for her safety and she has the right to refuse anything regarding her care. RN asked pt if RN could ask the questions and the pt could answer if the pt was okay with disclosing the answer. The pt agreed to this compromise. Once the remaining questions were asked, however, the pt remained noncompliant and denied all questions. Pt now subdued towards RN. RN asked pt if she was still fine with primary RN being her nurse for the remaining of the shift and the pt states she is okay with it. Charge RN notified of conversation and plan of care. Charge RN and MD also notified of noncompliance of the screening and primary RN presented statements that the pt stated over the course of care for a total of three consecutive shifts that contradicted the pt's noncompliance responses to complete the screening. Both in agreement with primary RN to quote answers and use RN best judgement to complete, see flowsheet.  IP consult to  ordered for further psych eval. q15min monitoring initiated at 2130. Charge RN notified of new " orders.

## 2018-04-30 NOTE — DISCHARGE PLANNING
Medical SW    Sw attended AM IDT Rounds.    Per face sheet, pt is resident of [place], , 57yo female, admitted 4/27 for sepsis, and carries SCP INS.     RN reports, pt admit for seizure like activity, over night highly agitated at times, opens eyes to his name, withdraws to pain, no BM since 27th, on bowel protocol, dialyizing daily, not mobilizing, vent day 5,     Plan: Sw to assist w/ d/c planning as needed.

## 2018-04-30 NOTE — PROGRESS NOTES
"Late Entry    2000- Myself as the primary RN updated the charge RNs Loree and Shakira WARD, the NAM Alexandr, and the supervisor Skyla, that the pt is very anxious and upset about a few events from her current stay; including that she wants to \"luis carlos the MD that prescribed the steroids for her without even seeing her in person and without reading her chart.\" She stated that, \"[facility] is not able to treat me correctly because I am too complex and sick. I am so mad at my  because he didn't tell the admitting MD in the ED that I prefer to be transferred and treated at [facility] because I do not trust [facility]. There is a paper in my chart that states this is what is to happen and I am very upset that he didn't remember. Because I worked here, I know too much and how it really works around here. [facility] doesn't care about their patients or their employees. I fill out the pt satisfaction survey after every single stay here and I never have anyone follow up with me even after I write specific details including names, time, date, and occurances.\" I then asked the pt if she would like me to escalate her desired plan of care up to transfer her to [facility] at her request. Pt stated, \"You don't have any power to do that. The hospital shuts down on the weekends and they do not care what happens to the patients until Monday. There isn't any  or case management to even do that. You can only transfer if you do it in the ED.\" I then asked the patient to please give me a yes or no answer to whether she would like to have me escalate her care to ensure she is satisfied. Pt stated, \"No, not at this time. Hopefully I will just be discharged in the morning so I can go see my real doctors since no one can take proper care of me at [facility].\" I then made sure she was in agreement of her plan of care for the shift. Pt in agreement at this time. I then redirected her to focus on her current status and stay so that she continues to move with " forward momentum to be discharged and maintaining health progression. I apologized for her unpleasant stay from January of this year and that I will escalate what she requests within reason to ensure she has a better experience and stay during this admission. Pt and RN in agreement to move forward and plan of care for the shift established. Pt has call light within reach, bed in lowest position, side rails up, bed alarm on, all pt belongings within reach, and instructed to call if she has any further requests until RN rounds in an hour.

## 2018-04-30 NOTE — DISCHARGE PLANNING
Medical SW    Referral: Sw met w/ IHD CM from pt's INS.    Pt is d/cing and indicates she does not want any community resources. Pt refused to complete previous depression screening by medical staff. Pt currently denies any depression concerns.    Plan: Sw to assist w/ d/c planning as needed.

## 2018-05-01 ENCOUNTER — PATIENT OUTREACH (OUTPATIENT)
Dept: HEALTH INFORMATION MANAGEMENT | Facility: OTHER | Age: 58
End: 2018-05-01

## 2018-05-01 LAB
CMV IGG SERPL IA-ACNC: <0.2 U/ML
CMV IGM SERPL IA-ACNC: <8 AU/ML
EBV EA-D IGG SER-ACNC: 35.6 U/ML (ref 0–10.9)
EBV NA IGG SER IA-ACNC: 420 U/ML (ref 0–21.9)
EBV VCA IGG SER IA-ACNC: 730 U/ML (ref 0–21.9)

## 2018-05-03 DIAGNOSIS — K59.03 DRUG-INDUCED CONSTIPATION: ICD-10-CM

## 2018-05-04 RX ORDER — LACTULOSE 10 G/15ML
20 SOLUTION ORAL 2 TIMES DAILY
Qty: 473 ML | Refills: 0 | Status: SHIPPED | OUTPATIENT
Start: 2018-05-04 | End: 2018-05-14 | Stop reason: SDUPTHER

## 2018-05-06 LAB — EKG IMPRESSION: NORMAL

## 2018-05-06 NOTE — DOCUMENTATION QUERY
"DOCUMENTATION QUERY    PROVIDERS: Please select “Cosign w/ note”to reply to query.    To better represent the severity of illness of your patient, please review the following information and exercise your independent professional judgment in responding to this query.     Acute on chronic hypoxemic respiratory failure is documented in the Progress Notes 4/28 and 4/29/2018 and chronic respiratory failure with hypoxia is documented in the History and Physical and Discharge Summary.  Based upon the clinical findings, risk factors, and treatment, please clarify is this is acute on chronic respiratory failure or chronic respiratory failure.    • Acute on Chronic Hypoxemic Respiratory Failure  • Chronic Respiratory Failure with Hypoxemia  • Other explanation of clinical findings  (Please Explain)          The medical record reflects the following:   Clinical Findings  Admitted with Sepsis and hypotension.  Fatigue and weakness.  On home O2 5 liters placed on 7.5 liters  H&P: \"states shortness of breath\"  VITAL SIGNS: BP (!) 95/47   Pulse (!) 55   Temp 36.8 °C (98.2 °F)   Resp (!) 21    ED note: \" states no acute respiratory distress\"     Treatment O2  Labs  IV Hydrocortisone     Risk Factors  Sarcoidosis  Liver Transplant  Immunocompromised   Sepsis  Hypotension  Pulmonary Hypertension  Home O2   Location within medical record  History and Physical,Progress Notes 4/28 and 4/29, Discharge Summary     Thank you,   Spring Ramos LPN,[facility]  Inpatient   [email or web address]        "

## 2018-05-09 ENCOUNTER — HOSPITAL ENCOUNTER (OUTPATIENT)
Dept: LAB | Facility: MEDICAL CENTER | Age: 58
End: 2018-05-09
Attending: INTERNAL MEDICINE
Payer: MEDICARE

## 2018-05-09 LAB
25(OH)D3 SERPL-MCNC: 35 NG/ML (ref 30–100)
ALBUMIN SERPL BCP-MCNC: 4.1 G/DL (ref 3.2–4.9)
ALBUMIN/GLOB SERPL: 1.6 G/DL
ALP SERPL-CCNC: 20 U/L (ref 30–99)
ALT SERPL-CCNC: 18 U/L (ref 2–50)
ANION GAP SERPL CALC-SCNC: 9 MMOL/L (ref 0–11.9)
APPEARANCE UR: CLEAR
AST SERPL-CCNC: 23 U/L (ref 12–45)
BASOPHILS # BLD AUTO: 0.9 % (ref 0–1.8)
BASOPHILS # BLD: 0.04 K/UL (ref 0–0.12)
BILIRUB SERPL-MCNC: 0.4 MG/DL (ref 0.1–1.5)
BILIRUB UR QL STRIP.AUTO: NEGATIVE
BUN SERPL-MCNC: 13 MG/DL (ref 8–22)
CALCIUM SERPL-MCNC: 9.6 MG/DL (ref 8.5–10.5)
CALCIUM SERPL-MCNC: 9.7 MG/DL (ref 8.5–10.5)
CHLORIDE SERPL-SCNC: 104 MMOL/L (ref 96–112)
CO2 SERPL-SCNC: 30 MMOL/L (ref 20–33)
COLOR UR: YELLOW
CREAT SERPL-MCNC: 1.11 MG/DL (ref 0.5–1.4)
CREAT UR-MCNC: 75.9 MG/DL
EOSINOPHIL # BLD AUTO: 0.16 K/UL (ref 0–0.51)
EOSINOPHIL NFR BLD: 3.7 % (ref 0–6.9)
ERYTHROCYTE [DISTWIDTH] IN BLOOD BY AUTOMATED COUNT: 46.5 FL (ref 35.9–50)
GGT SERPL-CCNC: 7 U/L (ref 7–34)
GLOBULIN SER CALC-MCNC: 2.5 G/DL (ref 1.9–3.5)
GLUCOSE SERPL-MCNC: 108 MG/DL (ref 65–99)
GLUCOSE UR STRIP.AUTO-MCNC: NEGATIVE MG/DL
HCT VFR BLD AUTO: 36.1 % (ref 37–47)
HGB BLD-MCNC: 11.3 G/DL (ref 12–16)
IMM GRANULOCYTES # BLD AUTO: 0.03 K/UL (ref 0–0.11)
IMM GRANULOCYTES NFR BLD AUTO: 0.7 % (ref 0–0.9)
KETONES UR STRIP.AUTO-MCNC: NEGATIVE MG/DL
LEUKOCYTE ESTERASE UR QL STRIP.AUTO: NEGATIVE
LYMPHOCYTES # BLD AUTO: 0.92 K/UL (ref 1–4.8)
LYMPHOCYTES NFR BLD: 21.1 % (ref 22–41)
MAGNESIUM SERPL-MCNC: 2.1 MG/DL (ref 1.5–2.5)
MAGNESIUM SERPL-MCNC: 2.2 MG/DL (ref 1.5–2.5)
MCH RBC QN AUTO: 28.7 PG (ref 27–33)
MCHC RBC AUTO-ENTMCNC: 31.3 G/DL (ref 33.6–35)
MCV RBC AUTO: 91.6 FL (ref 81.4–97.8)
MICRO URNS: NORMAL
MONOCYTES # BLD AUTO: 0.32 K/UL (ref 0–0.85)
MONOCYTES NFR BLD AUTO: 7.3 % (ref 0–13.4)
NEUTROPHILS # BLD AUTO: 2.9 K/UL (ref 2–7.15)
NEUTROPHILS NFR BLD: 66.3 % (ref 44–72)
NITRITE UR QL STRIP.AUTO: NEGATIVE
NRBC # BLD AUTO: 0 K/UL
NRBC BLD-RTO: 0 /100 WBC
PH UR STRIP.AUTO: 6.5 [PH]
PHOSPHATE SERPL-MCNC: 3 MG/DL (ref 2.5–4.5)
PLATELET # BLD AUTO: 79 K/UL (ref 164–446)
PMV BLD AUTO: 10.2 FL (ref 9–12.9)
POTASSIUM SERPL-SCNC: 3.5 MMOL/L (ref 3.6–5.5)
PROT SERPL-MCNC: 6.6 G/DL (ref 6–8.2)
PROT UR QL STRIP: NEGATIVE MG/DL
PROT UR-MCNC: <4 MG/DL (ref 0–15)
PROT/CREAT UR: NORMAL MG/G (ref 10–107)
PTH-INTACT SERPL-MCNC: 85.9 PG/ML (ref 14–72)
RBC # BLD AUTO: 3.94 M/UL (ref 4.2–5.4)
RBC UR QL AUTO: NEGATIVE
SODIUM SERPL-SCNC: 143 MMOL/L (ref 135–145)
SP GR UR STRIP.AUTO: 1.01
URATE SERPL-MCNC: 6.6 MG/DL (ref 1.9–8.2)
UROBILINOGEN UR STRIP.AUTO-MCNC: 0.2 MG/DL
WBC # BLD AUTO: 4.4 K/UL (ref 4.8–10.8)

## 2018-05-09 PROCEDURE — 82977 ASSAY OF GGT: CPT

## 2018-05-09 PROCEDURE — 81003 URINALYSIS AUTO W/O SCOPE: CPT

## 2018-05-09 PROCEDURE — 85025 COMPLETE CBC W/AUTO DIFF WBC: CPT

## 2018-05-09 PROCEDURE — 80197 ASSAY OF TACROLIMUS: CPT

## 2018-05-09 PROCEDURE — 84156 ASSAY OF PROTEIN URINE: CPT

## 2018-05-09 PROCEDURE — 83735 ASSAY OF MAGNESIUM: CPT | Mod: 91

## 2018-05-09 PROCEDURE — 84100 ASSAY OF PHOSPHORUS: CPT

## 2018-05-09 PROCEDURE — 80053 COMPREHEN METABOLIC PANEL: CPT

## 2018-05-09 PROCEDURE — 82306 VITAMIN D 25 HYDROXY: CPT

## 2018-05-09 PROCEDURE — 36415 COLL VENOUS BLD VENIPUNCTURE: CPT

## 2018-05-09 PROCEDURE — 83735 ASSAY OF MAGNESIUM: CPT

## 2018-05-09 PROCEDURE — 82570 ASSAY OF URINE CREATININE: CPT

## 2018-05-09 PROCEDURE — 83970 ASSAY OF PARATHORMONE: CPT

## 2018-05-09 PROCEDURE — 84550 ASSAY OF BLOOD/URIC ACID: CPT

## 2018-05-10 LAB — TACROLIMUS BLD-MCNC: 2.7 NG/ML

## 2018-05-14 DIAGNOSIS — K59.03 DRUG-INDUCED CONSTIPATION: ICD-10-CM

## 2018-05-14 RX ORDER — LACTULOSE 10 G/15ML
20 SOLUTION ORAL 2 TIMES DAILY
Qty: 473 ML | Refills: 0 | Status: SHIPPED | OUTPATIENT
Start: 2018-05-14 | End: 2018-05-23 | Stop reason: SDUPTHER

## 2018-05-18 DIAGNOSIS — K59.03 DRUG-INDUCED CONSTIPATION: ICD-10-CM

## 2018-05-18 RX ORDER — BISACODYL 10 MG
10 SUPPOSITORY, RECTAL RECTAL
Qty: 30 SUPPOSITORY | Refills: 3 | Status: SHIPPED | OUTPATIENT
Start: 2018-05-18 | End: 2018-09-20 | Stop reason: SDUPTHER

## 2018-05-21 RX ORDER — GABAPENTIN 600 MG/1
TABLET ORAL
Qty: 90 TAB | Refills: 2 | Status: SHIPPED | OUTPATIENT
Start: 2018-05-21 | End: 2018-11-16 | Stop reason: SDUPTHER

## 2018-05-23 DIAGNOSIS — E61.1 IRON DEFICIENCY: ICD-10-CM

## 2018-05-29 ENCOUNTER — OFFICE VISIT (OUTPATIENT)
Dept: MEDICAL GROUP | Facility: MEDICAL CENTER | Age: 58
End: 2018-05-29
Payer: MEDICARE

## 2018-05-29 VITALS
HEIGHT: 67 IN | DIASTOLIC BLOOD PRESSURE: 60 MMHG | WEIGHT: 157 LBS | BODY MASS INDEX: 24.64 KG/M2 | TEMPERATURE: 97.9 F | HEART RATE: 74 BPM | RESPIRATION RATE: 20 BRPM | OXYGEN SATURATION: 98 % | SYSTOLIC BLOOD PRESSURE: 102 MMHG

## 2018-05-29 DIAGNOSIS — Z79.899 CHRONIC PRESCRIPTION BENZODIAZEPINE USE: ICD-10-CM

## 2018-05-29 DIAGNOSIS — J44.1 CHRONIC OBSTRUCTIVE PULMONARY DISEASE WITH ACUTE EXACERBATION (HCC): ICD-10-CM

## 2018-05-29 DIAGNOSIS — F41.1 GAD (GENERALIZED ANXIETY DISORDER): ICD-10-CM

## 2018-05-29 DIAGNOSIS — E27.1 ADDISON'S DISEASE (HCC): ICD-10-CM

## 2018-05-29 DIAGNOSIS — D86.9 SARCOIDOSIS: ICD-10-CM

## 2018-05-29 DIAGNOSIS — J96.11 CHRONIC RESPIRATORY FAILURE WITH HYPOXIA (HCC): ICD-10-CM

## 2018-05-29 DIAGNOSIS — I95.89 OTHER SPECIFIED HYPOTENSION: ICD-10-CM

## 2018-05-29 DIAGNOSIS — E61.1 IRON DEFICIENCY: ICD-10-CM

## 2018-05-29 PROCEDURE — 99214 OFFICE O/P EST MOD 30 MIN: CPT | Performed by: FAMILY MEDICINE

## 2018-05-29 RX ORDER — MIDODRINE HYDROCHLORIDE 5 MG/1
5 TABLET ORAL 3 TIMES DAILY
Qty: 270 TAB | Refills: 1 | Status: SHIPPED | OUTPATIENT
Start: 2018-05-29 | End: 2018-08-28 | Stop reason: SDUPTHER

## 2018-05-29 RX ORDER — CYCLOBENZAPRINE HCL 10 MG
10 TABLET ORAL PRN
COMMUNITY
End: 2018-10-28

## 2018-05-29 RX ORDER — FLUTICASONE PROPIONATE 50 MCG
1 SPRAY, SUSPENSION (ML) NASAL DAILY
COMMUNITY
End: 2018-10-28

## 2018-05-29 RX ORDER — ALPRAZOLAM 1 MG/1
1 TABLET ORAL 3 TIMES DAILY
Qty: 90 TAB | Refills: 2 | Status: SHIPPED | OUTPATIENT
Start: 2018-06-16 | End: 2018-08-28 | Stop reason: SDUPTHER

## 2018-05-29 NOTE — ASSESSMENT & PLAN NOTE
Patient is on Xanax 1 mg TID for anxiety.  She has been on this for more that five years,  She has severe anxiety related to her chronic health problems

## 2018-05-29 NOTE — ASSESSMENT & PLAN NOTE
Blood pressure has been very low at night - 60/40.  She was hospitalized 4/27/18 for 3 days and was started on midodrine 5 mg - which she takes 2 at bedtime and sometimes once during the day.  She is having dizziness.

## 2018-05-29 NOTE — TELEPHONE ENCOUNTER
Was the patient seen in the last year in this department? Yes     Does patient have an active prescription for medications requested? No     Received Request Via: Pharmacy     PT IS REQUESTING A SCRIPT FOR RELISTOR BE EFFECTIVE 07/01/2018   She receives this medication through a patient assistance program and has a lot of trouble with them, she wants to have the prescription on file to avoid any gaps in her medication.

## 2018-05-29 NOTE — ASSESSMENT & PLAN NOTE
Patient is using a portable oxygen concentrator -  Inogen One - 5-6 LPM which was ordered by her pulmonologist.she is trying to get [employer] to pay for this. I've advised her to have the pulmonologist signed the forms.

## 2018-05-30 ENCOUNTER — NON-PROVIDER VISIT (OUTPATIENT)
Dept: MEDICAL GROUP | Facility: MEDICAL CENTER | Age: 58
End: 2018-05-30
Payer: MEDICARE

## 2018-05-30 DIAGNOSIS — Z94.4 STATUS POST LIVER TRANSPLANT (HCC): ICD-10-CM

## 2018-05-30 DIAGNOSIS — D86.9 SARCOIDOSIS: ICD-10-CM

## 2018-05-30 DIAGNOSIS — Z23 NEED FOR VACCINATION: ICD-10-CM

## 2018-05-30 PROCEDURE — 90746 HEPB VACCINE 3 DOSE ADULT IM: CPT | Performed by: NURSE PRACTITIONER

## 2018-05-30 PROCEDURE — G0010 ADMIN HEPATITIS B VACCINE: HCPCS | Performed by: NURSE PRACTITIONER

## 2018-05-30 NOTE — PROGRESS NOTES
"Roxana Cuba is a 58 y.o. female here for a non-provider visit for:   HEPATITIS B 3 of 3    Reason for immunization: continue or complete series started at the office  Immunization records indicate need for vaccine: Yes, confirmed with [place] [employer]  Minimum interval has been met for this vaccine: Yes  ABN completed: Not Indicated    Order and dose verified by: phu MCFARLAND Dated 07/20/2016 was given to patient: Yes  All IAC Questionnaire questions were answered \"No.\"    Patient tolerated injection and no adverse effects were observed or reported: Yes    Pt scheduled for next dose in series: Not Indicated  "

## 2018-05-30 NOTE — PROGRESS NOTES
Subjective:     Chief Complaint   Patient presents with   • Follow-Up       Roxana Cuba is a 58 y.o. female here today for evaluation and management of:    Chronic obstructive pulmonary disease with acute exacerbation (CMS-[facility]) ([facility])  Patient is using a portable oxygen concentrator -  Inogen One - 5-6 LPM which was ordered by her pulmonologist.she is trying to get [employer] to pay for this. I've advised her to have the pulmonologist signed the forms.    Chronic prescription benzodiazepine use  Patient is on Xanax 1 mg TID for anxiety.  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  She was hospitalized 4/27/18 for 3 days and was started on midodrine 5 mg - which she takes 2 at bedtime and sometimes once during the day.  She is having dizziness.       Allergies   Allergen Reactions   • Rhubarb Anaphylaxis   • Organic Nitrates      Nitroglycerin products should be avoided with the use of PDE-5 inhibitors as the combination can result in severe hypotension.   • Vancomycin Hcl      Causes red man syndrome when infused to fast         Current medicines (including changes today)  Current Outpatient Prescriptions   Medication Sig Dispense Refill   • Calcium-Magnesium-Vitamin D (CITRACAL CALCIUM+D PO) Take  by mouth.     • cyclobenzaprine (FLEXERIL) 10 MG Tab Take 10 mg by mouth 3 times a day as needed.     • fluticasone (FLONASE) 50 MCG/ACT nasal spray Spray 1 Spray in nose every day.     • insulin glargine (LANTUS SOLOSTAR) 100 UNIT/ML Solution Pen-injector injection Inject  as instructed every evening.     • midodrine (PROAMATINE) 5 MG Tab Take 1 Tab by mouth 3 times a day. 270 Tab 1   • ferrous sulfate (FEOSOL) 220 (44 Fe) MG/5ML Elixir Take 5 mL by mouth every day. 1 Bottle 10   • [START ON 6/16/2018] ALPRAZolam (XANAX) 1 MG Tab Take 1 Tab by mouth 3 times  a day for 90 days. 90 Tab 2   • methylnaltrexone (RELISTOR) 12 MG/0.6ML Solution Inject 0.6 mL as instructed every day. 84 Vial 3   • lactulose 10 GM/15ML Solution TAKE 30 ML BY MOUTH 2 TIMES A DAY. 473 mL 3   • gabapentin (NEURONTIN) 600 MG tablet TAKE 1 TABLET BY MOUTH 3 TIMES A DAY. 90 Tab 2   • bisacodyl (DULCOLAX) 10 MG Suppos Insert 1 Suppository in rectum 1 time daily as needed (constipation). 30 Suppository 3   • docusate sodium (COLACE) 100 MG Cap Take 100 mg by mouth 2 times a day.     • predniSONE (DELTASONE) 1 MG Tab Take 7 mg by mouth every day. Pt uses a 5 mg and 2- 1 mg tabs to equal a daily dose of 7 mg     • traZODone (DESYREL) 100 MG Tab Take 100 mg by mouth every evening.     • morphine 10 MG/5ML Solution Take 15-20 mg by mouth every 6 hours. Scheduled     • Oxymetazoline HCl (AFRIN NASAL SPRAY NA) Spray 1-2 Sprays in nose every bedtime.     • Calcium Citrate-Vitamin D 315-250 MG-UNIT Tab Take 1 Tab by mouth 2 Times a Day.     • Wheat Dextrin (BENEFIBER) Powder Take 1 Dose by mouth every day.     • SPIRIVA HANDIHALER 18 MCG Cap INHALE 1 CAP BY MOUTH DAILY. 30 Cap 6   • omeprazole (PRILOSEC) 40 MG delayed-release capsule Take 1 Cap by mouth every day. 90 Cap 0   • aspirin 81 MG tablet Take 1 Tab by mouth every day. 90 Tab 0   • polyethylene glycol 3350 (MIRALAX) Powder Take 17 g by mouth every day.     • Carboxymethylcell-Hypromellose (GENTEAL) 0.25-0.3 % Gel Place 0.3 mg in both eyes every bedtime.     • furosemide (LASIX) 40 MG Tab TAKE 1 TAB BY MOUTH EVERY DAY. 30 Tab 6   • Linaclotide (LINZESS) 290 MCG Cap Take 1 Cap by mouth every day. 90 Cap 3   • sertraline (ZOLOFT) 100 MG Tab Take 1 Tab by mouth every bedtime. 90 Tab 4   • levothyroxine (SYNTHROID) 137 MCG Tab Take 1 Tab by mouth Every morning on an empty stomach. 90 Tab 3   • tacrolimus (PROGRAF) 0.5 MG Cap Take 1.5 mg by mouth 2 Times a Day. Uses a 1 mg and a 0.5 mg tab to equal a     • ambrisentan (LETAIRIS) 10 MG tablet Take 1 Tab by  mouth every day. 30 Tab 11   • pravastatin (PRAVACHOL) 20 MG Tab TAKE 1 TAB BY MOUTH EVERY DAY. 30 Tab 11   • Tadalafil, PAH, (ADCIRCA) 20 MG Tab Take 40 mg by mouth every bedtime. Indications: Pulmonary Arterial Hypertension 180 Tab 3   • ascorbic acid (ASCORBIC ACID) 500 MG Tab Take 500 mg by mouth every day.     • ondansetron (ZOFRAN ODT) 4 MG TABLET DISPERSIBLE Take 1 Tab by mouth every 8 hours as needed for Nausea/Vomiting. Nausea and vomiting 270 Tab 4   • mycophenolate (CELLCEPT) 250 MG Cap Take 500 mg by mouth 2 times a day.     • NON SPECIFIED Take 1 Cap by mouth every evening. Bariatric Dietary Supplment        No current facility-administered medications for this visit.        She  has a past medical history of Breath shortness; Bronchitis ( ); Cardiomegaly; Chronic fatigue and malaise; Cirrhosis ([facility]) (December 2011); CKD (chronic kidney disease) stage 3, GFR 30-59 ml/min; Diabetes ([facility]); Fracture of left foot; GERD (gastroesophageal reflux disease); H/O Clostridium difficile infection (02-17-17); Hypothyroid; Low back pain (02-17-17); Mild aortic regurgitation and aortic valve sclerosis ( ); On home oxygen therapy; Platelet disorder ([facility]); Pneumonia; Psychiatric disorder; Pulmonary hypertension ([facility]); S/P cholecystectomy; Small bowel obstruction ([facility]); Splenomegaly; and VRE (vancomycin-resistant Enterococci).    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[facility]) 03/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      Priority: Medium   • CKD (chronic kidney disease) stage 3, GFR 30-59 ml/min- unclear cause, probably multifactorial 02/25/2014     Priority: Medium   • Chronic obstructive pulmonary disease with acute exacerbation ([facility]) 11/14/2013     Priority: Medium   • MGUS (monoclonal gammopathy of unknown significance) 11/14/2013     Priority: Medium   • Pancytopenia ([facility]) 09/13/2013     Priority: Medium   • History of pancreatitis 06/20/2012     Priority: Medium   • Pulmonary hypertension ([facility]) 02/03/2015     Priority: Low   • Sarcoidosis ([facility]) 11/14/2013     Priority: Low   • DCU (generalized anxiety disorder) 11/04/2013     Priority: Low   • Insomnia 11/04/2013     Priority: Low   • Lower extremity weakness 06/26/2013     Priority: Low   • Status post liver transplant Dr. 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   • Constipation due to opioid therapy 11/03/2017   • S/P panniculectomy 10/31/2017   • Steroid-induced hyperglycemia 10/03/2017   • Depression 07/14/2017   • SBO (small bowel obstruction) ([facility]) 07/14/2017   • History of Clostridium difficile infection 07/14/2017   • VRE (vancomycin-resistant Enterococci) 07/14/2017   • Herpes zoster without complication 07/14/2017   • IDDM (insulin dependent diabetes mellitus) ([facility]) 06/23/2017   • Chronic prescription benzodiazepine use 04/12/2017   • Chronic use of opiate drugs therapeutic purposes 04/12/2017   • Iron deficiency 01/20/2017   • GERD (gastroesophageal reflux disease) 01/04/2017   • Bradycardia 11/04/2016   • Other allergic rhinitis 07/22/2016   • Chronic pain syndrome 06/15/2016   • Back pain 02/22/2016   • Splenic lesion 11/14/2015   • Mild mitral regurgitation 07/14/2015   • Splenomegaly 07/14/2015   • On prednisone  "therapy 07/06/2015   • Chronic generalized abdominal pain 03/17/2015   • H/O non-ST elevation myocardial infarction (NSTEMI) 05/16/2013       ROS   No fever or chills.  No nausea or vomiting.  No chest pain or palpitations..  No pain with urination or hematuria.  No black or bloody stools.       Objective:     Blood pressure 102/60, pulse 74, temperature 36.6 °C (97.9 °F), resp. rate 20, height 1.702 m (5' 7\"), weight 71.2 kg (157 lb), SpO2 98 %. Body mass index is 24.59 kg/m².   Physical Exam:  Well developed, well nourished.  Alert, oriented in no acute distress.  Eye contact is good, speech goal directed, affect calm  Eyes: conjunctiva non-injected, sclera non-icteric.  Neck Supple.  No adenopathy or masses in the neck or supraclavicular regions. No thyromegaly  Lungs: clear to auscultation bilaterally with decreased excursion. No wheezes or rhonchi  CV: regular rate and rhythm. No murmur            Assessment and Plan:   The following treatment plan was discussed    1. Chronic obstructive pulmonary disease with acute exacerbation (HCC)  This is a chronic medical condition that is currently stable  Continue follow-up with pulmonology    2. Chronic respiratory failure with hypoxia (HCC)  This is a chronic medical condition that is currently stable  Continue continuous oxygen.    3. Chronic prescription benzodiazepine use  [facility] reviewed. Controlled substance agreement on chart. Refill alprazolam. Follow-up in 3 months    4. Sarcoidosis (HCC)  This is a chronic medical condition that is currently stable. Pulmonology and rheumatology following.    5. 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HCC)  Refill midodrine 5 mg 3 times a day. Continue to monitor blood pressure. Patient will let me know her blood pressures if symptomatic  - midodrine (PROAMATINE) 5 MG Tab; Take 1 Tab by mouth 3 times a day.  Dispense: 270 Tab; Refill: 1    6. Other specified hypotension  See #5  - midodrine (PROAMATINE) 5 MG Tab; Take 1 Tab by mouth 3 times a " day.  Dispense: 270 Tab; Refill: 1    7. Iron deficiency  Refill ferrous sulfate  - ferrous sulfate (FEOSOL) 220 (44 Fe) MG/5ML Elixir; Take 5 mL by mouth every day.  Dispense: 1 Bottle; Refill: 10    8. DUC (generalized anxiety disorder)  See #3  - ALPRAZolam (XANAX) 1 MG Tab; Take 1 Tab by mouth 3 times a day for 90 days.  Dispense: 90 Tab; Refill: 2    Any change or worsening of signs or symptoms, patient encouraged to follow-up or report to the emergency room for further evaluation. Patient understands and agrees.    Followup: Return in about 3 months (around 8/29/2018).

## 2018-06-06 ENCOUNTER — HOSPITAL ENCOUNTER (OUTPATIENT)
Dept: LAB | Facility: MEDICAL CENTER | Age: 58
End: 2018-06-06
Attending: INTERNAL MEDICINE
Payer: MEDICARE

## 2018-06-06 LAB
ALBUMIN SERPL BCP-MCNC: 4.3 G/DL (ref 3.2–4.9)
ALBUMIN/GLOB SERPL: 2.3 G/DL
ALP SERPL-CCNC: 25 U/L (ref 30–99)
ALT SERPL-CCNC: 15 U/L (ref 2–50)
ANION GAP SERPL CALC-SCNC: 5 MMOL/L (ref 0–11.9)
AST SERPL-CCNC: 16 U/L (ref 12–45)
BASOPHILS # BLD AUTO: 0.9 % (ref 0–1.8)
BASOPHILS # BLD: 0.03 K/UL (ref 0–0.12)
BILIRUB SERPL-MCNC: 0.3 MG/DL (ref 0.1–1.5)
BUN SERPL-MCNC: 21 MG/DL (ref 8–22)
CALCIUM SERPL-MCNC: 9 MG/DL (ref 8.5–10.5)
CHLORIDE SERPL-SCNC: 104 MMOL/L (ref 96–112)
CO2 SERPL-SCNC: 33 MMOL/L (ref 20–33)
CREAT SERPL-MCNC: 1.02 MG/DL (ref 0.5–1.4)
EOSINOPHIL # BLD AUTO: 0.13 K/UL (ref 0–0.51)
EOSINOPHIL NFR BLD: 3.8 % (ref 0–6.9)
ERYTHROCYTE [DISTWIDTH] IN BLOOD BY AUTOMATED COUNT: 43.3 FL (ref 35.9–50)
GGT SERPL-CCNC: 6 U/L (ref 7–34)
GLOBULIN SER CALC-MCNC: 1.9 G/DL (ref 1.9–3.5)
GLUCOSE SERPL-MCNC: 84 MG/DL (ref 65–99)
HCT VFR BLD AUTO: 38.6 % (ref 37–47)
HGB BLD-MCNC: 12.5 G/DL (ref 12–16)
IMM GRANULOCYTES # BLD AUTO: 0.01 K/UL (ref 0–0.11)
IMM GRANULOCYTES NFR BLD AUTO: 0.3 % (ref 0–0.9)
LYMPHOCYTES # BLD AUTO: 1.14 K/UL (ref 1–4.8)
LYMPHOCYTES NFR BLD: 33.1 % (ref 22–41)
MAGNESIUM SERPL-MCNC: 2.2 MG/DL (ref 1.5–2.5)
MCH RBC QN AUTO: 28.9 PG (ref 27–33)
MCHC RBC AUTO-ENTMCNC: 32.4 G/DL (ref 33.6–35)
MCV RBC AUTO: 89.1 FL (ref 81.4–97.8)
MONOCYTES # BLD AUTO: 0.32 K/UL (ref 0–0.85)
MONOCYTES NFR BLD AUTO: 9.3 % (ref 0–13.4)
NEUTROPHILS # BLD AUTO: 1.81 K/UL (ref 2–7.15)
NEUTROPHILS NFR BLD: 52.6 % (ref 44–72)
NRBC # BLD AUTO: 0 K/UL
NRBC BLD-RTO: 0 /100 WBC
PLATELET # BLD AUTO: 77 K/UL (ref 164–446)
PMV BLD AUTO: 10.5 FL (ref 9–12.9)
POTASSIUM SERPL-SCNC: 3.8 MMOL/L (ref 3.6–5.5)
PROT SERPL-MCNC: 6.2 G/DL (ref 6–8.2)
RBC # BLD AUTO: 4.33 M/UL (ref 4.2–5.4)
SODIUM SERPL-SCNC: 142 MMOL/L (ref 135–145)
WBC # BLD AUTO: 3.4 K/UL (ref 4.8–10.8)

## 2018-06-06 PROCEDURE — 85025 COMPLETE CBC W/AUTO DIFF WBC: CPT

## 2018-06-06 PROCEDURE — 80197 ASSAY OF TACROLIMUS: CPT

## 2018-06-06 PROCEDURE — 83735 ASSAY OF MAGNESIUM: CPT

## 2018-06-06 PROCEDURE — 80053 COMPREHEN METABOLIC PANEL: CPT

## 2018-06-06 PROCEDURE — 36415 COLL VENOUS BLD VENIPUNCTURE: CPT

## 2018-06-06 PROCEDURE — 82977 ASSAY OF GGT: CPT

## 2018-06-08 LAB — TACROLIMUS BLD-MCNC: 2.5 NG/ML

## 2018-06-12 ENCOUNTER — HOSPITAL ENCOUNTER (OUTPATIENT)
Dept: RADIOLOGY | Facility: MEDICAL CENTER | Age: 58
End: 2018-06-12
Attending: INTERNAL MEDICINE
Payer: MEDICARE

## 2018-06-12 DIAGNOSIS — D86.0 SARCOIDOSIS OF LUNG (HCC): ICD-10-CM

## 2018-06-12 DIAGNOSIS — I27.20 PULMONARY HYPERTENSION (HCC): ICD-10-CM

## 2018-06-12 PROCEDURE — 77074 RADEX OSSEOUS SURVEY LMTD: CPT

## 2018-06-13 RX ORDER — FUROSEMIDE 40 MG/1
TABLET ORAL
Qty: 30 TAB | Refills: 3 | Status: SHIPPED | OUTPATIENT
Start: 2018-06-13 | End: 2018-08-28

## 2018-06-19 ENCOUNTER — OFFICE VISIT (OUTPATIENT)
Dept: URGENT CARE | Facility: CLINIC | Age: 58
End: 2018-06-19
Payer: MEDICARE

## 2018-06-19 ENCOUNTER — TELEPHONE (OUTPATIENT)
Dept: CARDIOLOGY | Facility: MEDICAL CENTER | Age: 58
End: 2018-06-19

## 2018-06-19 VITALS
WEIGHT: 156 LBS | OXYGEN SATURATION: 98 % | HEART RATE: 66 BPM | HEIGHT: 68 IN | TEMPERATURE: 98.1 F | RESPIRATION RATE: 14 BRPM | SYSTOLIC BLOOD PRESSURE: 98 MMHG | DIASTOLIC BLOOD PRESSURE: 60 MMHG | BODY MASS INDEX: 23.64 KG/M2

## 2018-06-19 DIAGNOSIS — J02.9 EXUDATIVE PHARYNGITIS: Primary | ICD-10-CM

## 2018-06-19 LAB
INT CON NEG: NORMAL
INT CON POS: NORMAL
S PYO AG THROAT QL: NEGATIVE

## 2018-06-19 PROCEDURE — 87880 STREP A ASSAY W/OPTIC: CPT | Performed by: PHYSICIAN ASSISTANT

## 2018-06-19 PROCEDURE — 99214 OFFICE O/P EST MOD 30 MIN: CPT | Performed by: PHYSICIAN ASSISTANT

## 2018-06-19 RX ORDER — AMOXICILLIN 875 MG/1
875 TABLET, COATED ORAL 2 TIMES DAILY
Qty: 20 TAB | Refills: 0 | Status: SHIPPED | OUTPATIENT
Start: 2018-06-19 | End: 2018-06-29

## 2018-06-19 NOTE — TELEPHONE ENCOUNTER
Roxana Phan M.D. 17 hours ago (8:04 PM)        Dr. Phan         I just want to remind you that I am headed to [facility] on July 19th, for about 3 weeks.  I have a concern I am hoping you can help me with.  My Blood Pressure is LOW.  Dr. Real (hospitalist) and Dr. Phillip (PCP) have put me on Midodrine up to 3x daily, but basically PRN.  Examples:  87/55, 93/48, 94/48.  These a just a few, but my BP is low.  What are your recommendations, or a different kind of Rx??  I always appreciate you and your help.     Respectfully,   Roxana Cuba   [phone]           Roxana Jacobs 27 minutes ago (11:15 AM)        Sabrina,     Thanks for the quick response.  I am having some symptoms, although minimal.  I  am a bit light headed and dizzy from time to time, and since I am on 5 liters of oxygen 24/7 I am always short of breath.  I am staying well hydrated, but as Dr. Phan is aware, I have had significant weight loss in a very short period of time.  I had a Evon-en-Y performed at [facility] in March, for Pneumonia Aspiration purposes, and have lost 25 pounds in about 8 weeks.  I feel like the other two docs make light of my low BP and that concerns me not only because of my vacation, but my complexities.  I will still be here when Dr. Phan returns, I am hoping you can run this by him.  Thank you for your help Sabrina.   Roxana Cuba   [phone]             To Dr. Phan to advise

## 2018-06-19 NOTE — PROGRESS NOTES
"Subjective:      Pt is a 58 y.o. female who presents with Pharyngitis            HPI  PT presents to UC clinic today complaining of sore throat, pressure in ears, cough, fatigue, runny nose. PT denies CP, SOB, NVD, abdominal pain, joint pain. PT states these symptoms began around 3 days ago. PT states the pain is a 7/10 with swallowing, aching in nature and worse at night.  Pt has not taken any RX medications for this condition. Pt notes she is immunocompromised and wishes to treat this early as she is leaving for a month to [place]. The pt's medication list, problem list, and allergies have been evaluated and reviewed during today's visit.      PMH:  Past Medical History:   Diagnosis Date   • Breath shortness    • Bronchitis      2016   • Cardiomegaly    • Chronic fatigue and malaise    • Cirrhosis (HCC) December 2011    [place] [facility] [place]   • CKD (chronic kidney disease) stage 3, GFR 30-59 ml/min    • Diabetes ([facility])     reports hx of, resolved w/weight loss. Reports still checking bloodsugars twice daily and using insulin PRN   • Fracture of left foot    • GERD (gastroesophageal reflux disease)         • H/O Clostridium difficile infection 02-17-17    reports \"16 months ago\". Current stool sample 01-26-17 neg   • Hypothyroid    • Low back pain 02-17-17    and left foot, 7/10   • Mild aortic regurgitation and aortic valve sclerosis     • On home oxygen therapy     4 liters, Dr. Gaming   • Platelet disorder ([facility])     low platelets   • Pneumonia     aspiration,    • Psychiatric disorder     Mood disorder   • Pulmonary hypertension ([facility])        • S/P cholecystectomy    • Small bowel obstruction ([facility])     01-   • Splenomegaly    • VRE (vancomycin-resistant Enterococci)     02-17-17, pt declines knowledge of this       PSH:  Past Surgical History:   Procedure Laterality Date   • OTHER  12/11/2017    paniculectomy   • COLONOSCOPY N/A 3/27/2017    Procedure: COLONOSCOPY;  Surgeon: " Osman Matt M.D.;  Location: SURGERY SAME DAY [facility];  Service:    • GASTROSCOPY N/A 3/27/2017    Procedure: GASTROSCOPY;  Surgeon: Osman Matt M.D.;  Location: SURGERY SAME DAY [facility];  Service:    • BREAST BIOPSY Left 2/21/2017    Procedure: BREAST BIOPSY - WIRE LOCALIZED EXCISONAL ;  Surgeon: Jane Zhao M.D.;  Location: SURGERY SAME DAY [facility];  Service:    • LUNG BIOPSY OPEN  11/2016   • OTHER ABDOMINAL SURGERY      Gasric Sleeve   • BONE MARROW ASPIRATION  3/16/2015    Performed by Marlena Hi M.D. at ENDOSCOPY [facility]   • BONE MARROW BIOPSY, NDL/TROCAR  3/16/2015    Performed by Marlena Hi M.D. at ENDOSCOPY [facility]   • RECOVERY  3/31/2014    Performed by [facility] at SURGERY [facility]   • RECOVERY  3/24/2014    Performed by [facility] at SURGERY SAME DAY [facility]   • RECOVERY  1/21/2014    Performed by [facility] at SURGERY SAME DAY [facility]   • BRONCHOSCOPY-ENDO  11/15/2013    Performed by Ha Perez M.D. at ENDOSCOPY [facility]   • RECOVERY  2/27/2013    Performed by [facility] at SURGERY SAME DAY [facility]   • BONE MARROW ASPIRATION  12/31/2012    Performed by Josemanuel Real M.D. at [facility]   • BONE MARROW BIOPSY, NDL/TROCAR  12/31/2012    Performed by Josemanuel Real M.D. at [facility]   • GASTROSCOPY  9/28/2012    Performed by Aravind Richards M.D. at SURGERY [facility]   • SIGMOIDOSCOPY FLEX  9/26/2012    Performed by Kristopher Cardoso M.D. at ENDOSCOPY [facility]   • BRONCHOSCOPY-ENDO  6/19/2012    Performed by MARLENA MURILLO at [facility]   • BRONCHOSCOPY-ENDO  5/29/2012    Performed by SUYAPA CAMARENA at [facility]   • OTHER ABDOMINAL SURGERY  December 2011    Liver transplant at [facility] [place] by Dr. [place].   • GASTROSCOPY-ENDO  3/12/2010    Performed by CAMELIA SAMANO at [facility]   • EXAM UNDER ANESTHESIA  2009    Performed by BIRD ABDI at SURGERY [facility]   • HEMORRHOIDECTOMY  2009    Performed by BIRD ABDI at SURGERY [facility]   • KELBY BY LAPAROSCOPY  2009    Performed by BIRD ABDI at SURGERY [facility]   • CARPAL TUNNEL RELEASE          • GASTRIC BYPASS LAPAROSCOPIC     • HYSTERECTOMY, TOTAL ABDOMINAL          • OTHER      Breast reduction   • PB ANESTH,NOSE,SINUS SURGERY     • TONSILLECTOMY         Fam Hx:    family history includes Alcohol/Drug in her maternal grandfather, maternal grandmother, and mother; Cancer in her paternal aunt; Diabetes in her father; Heart Disease in her father; Hyperlipidemia in her father and mother; Hypertension in her father; Non-contributory in her mother; Other in her father; Stroke in her father.  Family Status   Relation Status   • Father    • Mother    • Sister Alive   • Paternal Aunt    • Maternal Grandmother    • Maternal Grandfather        Soc HX:  Social History     Social History   • Marital status:      Spouse name: N/A   • Number of children: N/A   • Years of education: N/A     Occupational History   • Not on file.     Social History Main Topics   • Smoking status: Never Smoker   • Smokeless tobacco: Never Used      Comment: avoid all tobacco products   • Alcohol use No      Comment: 2009 quit drinking   • Drug use: No   • Sexual activity: Not on file     Other Topics Concern   • Not on file     Social History Narrative   • No narrative on file         Medications:    Current Outpatient Prescriptions:   •  amoxicillin (AMOXIL) 875 MG tablet, Take 1 Tab by mouth 2 times a day for 10 days., Disp: 20 Tab, Rfl: 0  •  furosemide (LASIX) 40 MG Tab, TAKE 1 TAB BY MOUTH EVERY DAY., Disp: 30 Tab, Rfl: 3  •  Calcium-Magnesium-Vitamin D (CITRACAL CALCIUM+D PO), Take  by mouth., Disp: , Rfl:   •  cyclobenzaprine (FLEXERIL) 10 MG Tab, Take 10 mg by mouth 3 times a day as  needed., Disp: , Rfl:   •  fluticasone (FLONASE) 50 MCG/ACT nasal spray, Spray 1 Spray in nose every day., Disp: , Rfl:   •  insulin glargine (LANTUS SOLOSTAR) 100 UNIT/ML Solution Pen-injector injection, Inject  as instructed every evening., Disp: , Rfl:   •  midodrine (PROAMATINE) 5 MG Tab, Take 1 Tab by mouth 3 times a day., Disp: 270 Tab, Rfl: 1  •  ferrous sulfate (FEOSOL) 220 (44 Fe) MG/5ML Elixir, Take 5 mL by mouth every day., Disp: 1 Bottle, Rfl: 10  •  ALPRAZolam (XANAX) 1 MG Tab, Take 1 Tab by mouth 3 times a day for 90 days., Disp: 90 Tab, Rfl: 2  •  methylnaltrexone (RELISTOR) 12 MG/0.6ML Solution, Inject 0.6 mL as instructed every day., Disp: 84 Vial, Rfl: 3  •  lactulose 10 GM/15ML Solution, TAKE 30 ML BY MOUTH 2 TIMES A DAY., Disp: 473 mL, Rfl: 3  •  gabapentin (NEURONTIN) 600 MG tablet, TAKE 1 TABLET BY MOUTH 3 TIMES A DAY., Disp: 90 Tab, Rfl: 2  •  bisacodyl (DULCOLAX) 10 MG Suppos, Insert 1 Suppository in rectum 1 time daily as needed (constipation)., Disp: 30 Suppository, Rfl: 3  •  docusate sodium (COLACE) 100 MG Cap, Take 100 mg by mouth 2 times a day., Disp: , Rfl:   •  predniSONE (DELTASONE) 1 MG Tab, Take 7 mg by mouth every day. Pt uses a 5 mg and 2- 1 mg tabs to equal a daily dose of 7 mg, Disp: , Rfl:   •  traZODone (DESYREL) 100 MG Tab, Take 100 mg by mouth every evening., Disp: , Rfl:   •  morphine 10 MG/5ML Solution, Take 15-20 mg by mouth every 6 hours. Scheduled, Disp: , Rfl:   •  Oxymetazoline HCl (AFRIN NASAL SPRAY NA), Spray 1-2 Sprays in nose every bedtime., Disp: , Rfl:   •  Calcium Citrate-Vitamin D 315-250 MG-UNIT Tab, Take 1 Tab by mouth 2 Times a Day., Disp: , Rfl:   •  Wheat Dextrin (BENEFIBER) Powder, Take 1 Dose by mouth every day., Disp: , Rfl:   •  SPIRIVA HANDIHALER 18 MCG Cap, INHALE 1 CAP BY MOUTH DAILY., Disp: 30 Cap, Rfl: 6  •  omeprazole (PRILOSEC) 40 MG delayed-release capsule, Take 1 Cap by mouth every day., Disp: 90 Cap, Rfl: 0  •  aspirin 81 MG tablet, Take 1  Tab by mouth every day., Disp: 90 Tab, Rfl: 0  •  polyethylene glycol 3350 (MIRALAX) Powder, Take 17 g by mouth every day., Disp: , Rfl:   •  Carboxymethylcell-Hypromellose (GENTEAL) 0.25-0.3 % Gel, Place 0.3 mg in both eyes every bedtime., Disp: , Rfl:   •  furosemide (LASIX) 40 MG Tab, TAKE 1 TAB BY MOUTH EVERY DAY., Disp: 30 Tab, Rfl: 6  •  Linaclotide (LINZESS) 290 MCG Cap, Take 1 Cap by mouth every day., Disp: 90 Cap, Rfl: 3  •  sertraline (ZOLOFT) 100 MG Tab, Take 1 Tab by mouth every bedtime., Disp: 90 Tab, Rfl: 4  •  levothyroxine (SYNTHROID) 137 MCG Tab, Take 1 Tab by mouth Every morning on an empty stomach., Disp: 90 Tab, Rfl: 3  •  NON SPECIFIED, Take 1 Cap by mouth every evening. Bariatric Dietary Supplment , Disp: , Rfl:   •  tacrolimus (PROGRAF) 0.5 MG Cap, Take 1.5 mg by mouth 2 Times a Day. Uses a 1 mg and a 0.5 mg tab to equal a, Disp: , Rfl:   •  ambrisentan (LETAIRIS) 10 MG tablet, Take 1 Tab by mouth every day., Disp: 30 Tab, Rfl: 11  •  pravastatin (PRAVACHOL) 20 MG Tab, TAKE 1 TAB BY MOUTH EVERY DAY., Disp: 30 Tab, Rfl: 11  •  Tadalafil, PAH, (ADCIRCA) 20 MG Tab, Take 40 mg by mouth every bedtime. Indications: Pulmonary Arterial Hypertension, Disp: 180 Tab, Rfl: 3  •  ascorbic acid (ASCORBIC ACID) 500 MG Tab, Take 500 mg by mouth every day., Disp: , Rfl:   •  ondansetron (ZOFRAN ODT) 4 MG TABLET DISPERSIBLE, Take 1 Tab by mouth every 8 hours as needed for Nausea/Vomiting. Nausea and vomiting, Disp: 270 Tab, Rfl: 4  •  mycophenolate (CELLCEPT) 250 MG Cap, Take 500 mg by mouth 2 times a day., Disp: , Rfl:       Allergies:  Rhubarb; Organic nitrates; and Vancomycin hcl    ROS  Constitutional: Positive for  malaise/fatigue.   HENT: Positive for congestion and sore throat. Negative for ear pain.    Eyes: Negative for blurred vision, double vision and photophobia.   Respiratory: Negative for cough, shortness of breath and wheezing.    Cardiovascular: Negative for chest pain and palpitations.  "  Gastrointestinal: Negative for nausea, vomiting, abdominal pain, diarrhea and constipation.   Genitourinary: Negative for dysuria and flank pain.   Musculoskeletal: Negative for falls and myalgias.   Skin: Negative for itching and rash.   Neurological: Negative for dizziness and tingling.   Endo/Heme/Allergies: Does not bruise/bleed easily.   Psychiatric/Behavioral: Negative for depression. The patient is not nervous/anxious.                   Objective:     BP (!) 98/60   Pulse 66   Temp 36.7 °C (98.1 °F)   Resp 14   Ht 1.727 m (5' 8\")   Wt 70.8 kg (156 lb)   SpO2 98%   BMI 23.72 kg/m²      Physical Exam      Constitutional: PT is oriented to person, place, and time. PT appears well-developed and well-nourished. No distress.   HENT:   Head: Normocephalic and atraumatic.   Right Ear: Hearing, tympanic membrane, external ear and ear canal normal.   Left Ear: Hearing, tympanic membrane, external ear and ear canal normal.   Nose: Mucosal edema, rhinorrhea and sinus tenderness present. Right sinus exhibits frontal sinus tenderness. Left sinus exhibits frontal sinus tenderness.   Mouth/Throat: Uvula is midline. Mucous membranes are pale. Posterior oropharyngeal edema and posterior oropharyngeal erythema with exudate b/l oropharynx.   Eyes: Conjunctivae normal and EOM are normal. Pupils are equal, round, and reactive to light.   Neck: Normal range of motion. Neck supple. No thyromegaly present.   Cardiovascular: Normal rate, regular rhythm, normal heart sounds and intact distal pulses.  Exam reveals no gallop and no friction rub.    No murmur heard.  Pulmonary/Chest: Effort normal and breath sounds normal. No respiratory distress. PT has no wheezes. PT has no rales. PT exhibits no tenderness.   Abdominal: Soft. Bowel sounds are normal. PT exhibits no distension and no mass. There is no tenderness. There is no rebound and no guarding.   Musculoskeletal: Normal range of motion. PT exhibits no edema and no " tenderness.   Lymphadenopathy:     PT has no cervical adenopathy.   Neurological: PT is alert and oriented to person, place, and time. PT displays normal reflexes. No cranial nerve deficit. PT exhibits normal muscle tone. Coordination normal.   Skin: Skin is warm and dry. No rash noted. No erythema.   Psychiatric: PT has a normal mood and affect. PT behavior is normal. Judgment and thought content normal.          Assessment/Plan:     1. Exudative pharyngitis  Back-up abx if symptoms do not improve in 2-3 days. PT on clinical presentation has exudate on oropharynx and it's possible beyond the strep A testing that this could be a different type of strep (C/G) or A. haemolyticum     - POCT Rapid Strep A-->NEG  - amoxicillin (AMOXIL) 875 MG tablet; Take 1 Tab by mouth 2 times a day for 10 days.  Dispense: 20 Tab; Refill: 0    Rest, fluids encouraged.  OTC decongestant for congestion  AVS with medical info given.  Pt was in full understanding and agreement with the plan.  Follow-up as needed if symptoms worsen or fail to improve.

## 2018-07-11 ENCOUNTER — HOSPITAL ENCOUNTER (OUTPATIENT)
Dept: LAB | Facility: MEDICAL CENTER | Age: 58
End: 2018-07-11
Attending: INTERNAL MEDICINE
Payer: MEDICARE

## 2018-07-11 LAB
ALBUMIN SERPL BCP-MCNC: 4.3 G/DL (ref 3.2–4.9)
ALBUMIN/GLOB SERPL: 2.2 G/DL
ALP SERPL-CCNC: 24 U/L (ref 30–99)
ALT SERPL-CCNC: 17 U/L (ref 2–50)
ANION GAP SERPL CALC-SCNC: 8 MMOL/L (ref 0–11.9)
AST SERPL-CCNC: 19 U/L (ref 12–45)
BASOPHILS # BLD AUTO: 0.2 % (ref 0–1.8)
BASOPHILS # BLD: 0.01 K/UL (ref 0–0.12)
BILIRUB SERPL-MCNC: 0.3 MG/DL (ref 0.1–1.5)
BUN SERPL-MCNC: 22 MG/DL (ref 8–22)
CALCIUM SERPL-MCNC: 9.5 MG/DL (ref 8.5–10.5)
CHLORIDE SERPL-SCNC: 106 MMOL/L (ref 96–112)
CO2 SERPL-SCNC: 28 MMOL/L (ref 20–33)
CREAT SERPL-MCNC: 1.14 MG/DL (ref 0.5–1.4)
EOSINOPHIL # BLD AUTO: 0.18 K/UL (ref 0–0.51)
EOSINOPHIL NFR BLD: 3.1 % (ref 0–6.9)
ERYTHROCYTE [DISTWIDTH] IN BLOOD BY AUTOMATED COUNT: 42.8 FL (ref 35.9–50)
GGT SERPL-CCNC: 6 U/L (ref 7–34)
GLOBULIN SER CALC-MCNC: 2 G/DL (ref 1.9–3.5)
GLUCOSE SERPL-MCNC: 85 MG/DL (ref 65–99)
HCT VFR BLD AUTO: 38.9 % (ref 37–47)
HGB BLD-MCNC: 12.8 G/DL (ref 12–16)
IMM GRANULOCYTES # BLD AUTO: 0.01 K/UL (ref 0–0.11)
IMM GRANULOCYTES NFR BLD AUTO: 0.2 % (ref 0–0.9)
LYMPHOCYTES # BLD AUTO: 1.38 K/UL (ref 1–4.8)
LYMPHOCYTES NFR BLD: 23.8 % (ref 22–41)
MAGNESIUM SERPL-MCNC: 1.9 MG/DL (ref 1.5–2.5)
MCH RBC QN AUTO: 28.9 PG (ref 27–33)
MCHC RBC AUTO-ENTMCNC: 32.9 G/DL (ref 33.6–35)
MCV RBC AUTO: 87.8 FL (ref 81.4–97.8)
MONOCYTES # BLD AUTO: 0.31 K/UL (ref 0–0.85)
MONOCYTES NFR BLD AUTO: 5.3 % (ref 0–13.4)
NEUTROPHILS # BLD AUTO: 3.92 K/UL (ref 2–7.15)
NEUTROPHILS NFR BLD: 67.4 % (ref 44–72)
NRBC # BLD AUTO: 0 K/UL
NRBC BLD-RTO: 0 /100 WBC
PLATELET # BLD AUTO: 83 K/UL (ref 164–446)
PMV BLD AUTO: 10.2 FL (ref 9–12.9)
POTASSIUM SERPL-SCNC: 3.8 MMOL/L (ref 3.6–5.5)
PROT SERPL-MCNC: 6.3 G/DL (ref 6–8.2)
RBC # BLD AUTO: 4.43 M/UL (ref 4.2–5.4)
SODIUM SERPL-SCNC: 142 MMOL/L (ref 135–145)
WBC # BLD AUTO: 5.8 K/UL (ref 4.8–10.8)

## 2018-07-11 PROCEDURE — 83735 ASSAY OF MAGNESIUM: CPT

## 2018-07-11 PROCEDURE — 80197 ASSAY OF TACROLIMUS: CPT

## 2018-07-11 PROCEDURE — 85025 COMPLETE CBC W/AUTO DIFF WBC: CPT

## 2018-07-11 PROCEDURE — 80053 COMPREHEN METABOLIC PANEL: CPT

## 2018-07-11 PROCEDURE — 82977 ASSAY OF GGT: CPT

## 2018-07-11 PROCEDURE — 36415 COLL VENOUS BLD VENIPUNCTURE: CPT

## 2018-07-12 NOTE — TELEPHONE ENCOUNTER
Maxwell Phan M.D.   You 2 weeks ago      Two things:  BP and whether you have any symptoms from it.  If the BP is a surprise and you feel fine, then its ok.  Otherwise try taking the midodrine if you are not already taking it.  Let me know if you want any more info. (Routing comment)      [employer] message sent to patient

## 2018-07-13 LAB — TACROLIMUS BLD-MCNC: 3.2 NG/ML

## 2018-07-16 ENCOUNTER — APPOINTMENT (RX ONLY)
Dept: URBAN - METROPOLITAN AREA CLINIC 4 | Facility: CLINIC | Age: 58
Setting detail: DERMATOLOGY
End: 2018-07-16

## 2018-07-16 DIAGNOSIS — D869 SARCOIDOSIS: ICD-10-CM

## 2018-07-16 DIAGNOSIS — B35.3 TINEA PEDIS: ICD-10-CM

## 2018-07-16 PROBLEM — J44.9 CHRONIC OBSTRUCTIVE PULMONARY DISEASE, UNSPECIFIED: Status: ACTIVE | Noted: 2018-07-16

## 2018-07-16 PROBLEM — E13.9 OTHER SPECIFIED DIABETES MELLITUS WITHOUT COMPLICATIONS: Status: ACTIVE | Noted: 2018-07-16

## 2018-07-16 PROBLEM — D86.9 SARCOIDOSIS, UNSPECIFIED: Status: ACTIVE | Noted: 2018-07-16

## 2018-07-16 PROCEDURE — ? BIOPSY BY PUNCH METHOD

## 2018-07-16 PROCEDURE — ? COUNSELING

## 2018-07-16 PROCEDURE — 11100: CPT

## 2018-07-16 PROCEDURE — 99202 OFFICE O/P NEW SF 15 MIN: CPT | Mod: 25

## 2018-07-16 PROCEDURE — ? TREATMENT REGIMEN

## 2018-07-16 ASSESSMENT — LOCATION ZONE DERM
LOCATION ZONE: LEG
LOCATION ZONE: FEET

## 2018-07-16 ASSESSMENT — LOCATION SIMPLE DESCRIPTION DERM
LOCATION SIMPLE: RIGHT PLANTAR SURFACE
LOCATION SIMPLE: RIGHT PRETIBIAL REGION

## 2018-07-16 ASSESSMENT — LOCATION DETAILED DESCRIPTION DERM
LOCATION DETAILED: RIGHT PROXIMAL PRETIBIAL REGION
LOCATION DETAILED: RIGHT MEDIAL PLANTAR MIDFOOT

## 2018-07-16 NOTE — PROCEDURE: BIOPSY BY PUNCH METHOD
Anesthesia Volume In Cc: 2
Epidermal Sutures: 4-0 Prolene
Patient Will Remove Sutures At Home?: Yes
Number Of Epidermal Sutures (Optional): 1
Bill 43634 For Specimen Handling/Conveyance To Laboratory?: no
Consent: Written consent was obtained and risks were reviewed including but not limited to scarring, infection, bleeding, scabbing, incomplete removal, nerve damage and allergy to anesthesia.
Size Of Lesion In Cm (Optional): 0
Home Suture Removal Text: Patient was provided a home suture removal kit and will remove their sutures at home.  If they have any questions or difficulties they will call the office.
Notification Instructions: Patient will be notified of biopsy results. However, patient instructed to call the office if not contacted within 2 weeks.
Billing Type: Third-Party Bill
Hemostasis: None
Biopsy Type: H and E
Post-Care Instructions: I reviewed with the patient in detail post-care instructions. Patient is to keep the biopsy site dry overnight, and then apply bacitracin twice daily until healed. Patient may apply hydrogen peroxide soaks to remove any crusting.
Anesthesia Type: 1% lidocaine with epinephrine
Suture Removal: 14 days
Wound Care: Petrolatum
Punch Size In Mm: 4
Lab Facility: 
Dressing: bandage
Lab: [id number]
Detail Level: Detailed

## 2018-07-16 NOTE — HPI: RASH (SARCOIDOSIS)
How Severe Is It?: mild
Is This A New Presentation, Or A Follow-Up?: Rash
Additional History: Pt has had a history of a liver transplant 6.5 years ago. Pt is on prograf and cellcept and also prednisone. Pt has internal involvement of sarcoidosis and also has a distended spleen but surgeon does not want to remove as this would leave the pt with no immune system. Pt is already immune compromised from medications. Pt states lesion is growing on the medial side and is becoming more painful and bumpy.

## 2018-07-31 DIAGNOSIS — I27.21 PAH (PULMONARY ARTERY HYPERTENSION) (HCC): ICD-10-CM

## 2018-08-03 RX ORDER — AMBRISENTAN 10 MG/1
TABLET, FILM COATED ORAL
Qty: 30 TAB | Status: SHIPPED | OUTPATIENT
Start: 2018-08-03 | End: 2018-08-28

## 2018-08-08 DIAGNOSIS — E78.01 FAMILIAL HYPERCHOLESTEROLEMIA: ICD-10-CM

## 2018-08-08 RX ORDER — PRAVASTATIN SODIUM 20 MG
20 TABLET ORAL DAILY
Qty: 30 TAB | Refills: 10 | Status: SHIPPED | OUTPATIENT
Start: 2018-08-08 | End: 2018-08-27 | Stop reason: SDUPTHER

## 2018-08-20 ENCOUNTER — TELEPHONE (OUTPATIENT)
Dept: MEDICAL GROUP | Facility: MEDICAL CENTER | Age: 58
End: 2018-08-20

## 2018-08-20 NOTE — TELEPHONE ENCOUNTER
VOICEMAIL  1. Caller Name: Roxana Johansson                      Call Back Number: [phone] (home)     2. Message: Pt needs updated [employer] orders, 3 month supply, printed and faxed to [facility] patient assistance.     3. Patient approves office to leave a detailed voicemail/MyChart message: N\A

## 2018-08-27 DIAGNOSIS — E78.01 FAMILIAL HYPERCHOLESTEROLEMIA: ICD-10-CM

## 2018-08-27 RX ORDER — PRAVASTATIN SODIUM 20 MG
20 TABLET ORAL DAILY
Qty: 90 TAB | Refills: 2 | Status: SHIPPED | OUTPATIENT
Start: 2018-08-27 | End: 2019-04-25 | Stop reason: SDUPTHER

## 2018-08-28 ENCOUNTER — TELEPHONE (OUTPATIENT)
Dept: MEDICAL GROUP | Facility: MEDICAL CENTER | Age: 58
End: 2018-08-28

## 2018-08-28 ENCOUNTER — OFFICE VISIT (OUTPATIENT)
Dept: CARDIOLOGY | Facility: MEDICAL CENTER | Age: 58
End: 2018-08-28
Payer: MEDICARE

## 2018-08-28 ENCOUNTER — OFFICE VISIT (OUTPATIENT)
Dept: MEDICAL GROUP | Facility: MEDICAL CENTER | Age: 58
End: 2018-08-28
Payer: MEDICARE

## 2018-08-28 VITALS
SYSTOLIC BLOOD PRESSURE: 104 MMHG | RESPIRATION RATE: 20 BRPM | OXYGEN SATURATION: 96 % | BODY MASS INDEX: 22.6 KG/M2 | TEMPERATURE: 98.8 F | DIASTOLIC BLOOD PRESSURE: 60 MMHG | HEART RATE: 78 BPM | WEIGHT: 144 LBS | HEIGHT: 67 IN

## 2018-08-28 VITALS
BODY MASS INDEX: 22.6 KG/M2 | HEART RATE: 70 BPM | SYSTOLIC BLOOD PRESSURE: 110 MMHG | HEIGHT: 67 IN | OXYGEN SATURATION: 96 % | WEIGHT: 144 LBS | DIASTOLIC BLOOD PRESSURE: 60 MMHG

## 2018-08-28 DIAGNOSIS — J96.11 CHRONIC RESPIRATORY FAILURE WITH HYPOXIA (HCC): ICD-10-CM

## 2018-08-28 DIAGNOSIS — D47.2 MGUS (MONOCLONAL GAMMOPATHY OF UNKNOWN SIGNIFICANCE): ICD-10-CM

## 2018-08-28 DIAGNOSIS — K76.81 HEPATOPULMONARY SYNDROME (HCC): ICD-10-CM

## 2018-08-28 DIAGNOSIS — Q21.11 OSTIUM SECUNDUM TYPE ATRIAL SEPTAL DEFECT: ICD-10-CM

## 2018-08-28 DIAGNOSIS — J32.9 RECURRENT SINUSITIS: ICD-10-CM

## 2018-08-28 DIAGNOSIS — I35.1 MILD AORTIC REGURGITATION: ICD-10-CM

## 2018-08-28 DIAGNOSIS — I25.2 H/O NON-ST ELEVATION MYOCARDIAL INFARCTION (NSTEMI): ICD-10-CM

## 2018-08-28 DIAGNOSIS — Z79.899 HIGH RISK MEDICATION USE: ICD-10-CM

## 2018-08-28 DIAGNOSIS — G89.4 CHRONIC PAIN SYNDROME: ICD-10-CM

## 2018-08-28 DIAGNOSIS — K59.03 DRUG-INDUCED CONSTIPATION: ICD-10-CM

## 2018-08-28 DIAGNOSIS — E27.1 ADDISON'S DISEASE (HCC): ICD-10-CM

## 2018-08-28 DIAGNOSIS — I95.89 OTHER SPECIFIED HYPOTENSION: ICD-10-CM

## 2018-08-28 DIAGNOSIS — F41.1 GAD (GENERALIZED ANXIETY DISORDER): ICD-10-CM

## 2018-08-28 DIAGNOSIS — R00.1 BRADYCARDIA: ICD-10-CM

## 2018-08-28 DIAGNOSIS — J06.9 VIRAL UPPER RESPIRATORY TRACT INFECTION: ICD-10-CM

## 2018-08-28 DIAGNOSIS — N18.30 CKD (CHRONIC KIDNEY DISEASE) STAGE 3, GFR 30-59 ML/MIN (HCC): ICD-10-CM

## 2018-08-28 DIAGNOSIS — R16.1 SPLENOMEGALY: ICD-10-CM

## 2018-08-28 DIAGNOSIS — F51.01 PRIMARY INSOMNIA: ICD-10-CM

## 2018-08-28 DIAGNOSIS — I27.20 PULMONARY HYPERTENSION (HCC): ICD-10-CM

## 2018-08-28 DIAGNOSIS — J44.1 CHRONIC OBSTRUCTIVE PULMONARY DISEASE WITH ACUTE EXACERBATION (HCC): ICD-10-CM

## 2018-08-28 DIAGNOSIS — E78.00 PURE HYPERCHOLESTEROLEMIA: ICD-10-CM

## 2018-08-28 PROBLEM — I95.1 ORTHOSTATIC HYPOTENSION: Status: ACTIVE | Noted: 2017-10-27

## 2018-08-28 PROCEDURE — 99214 OFFICE O/P EST MOD 30 MIN: CPT | Performed by: INTERNAL MEDICINE

## 2018-08-28 PROCEDURE — 99214 OFFICE O/P EST MOD 30 MIN: CPT | Performed by: FAMILY MEDICINE

## 2018-08-28 RX ORDER — LACTULOSE 10 G/15ML
20 SOLUTION ORAL 2 TIMES DAILY
Qty: 1800 ML | Refills: 3 | Status: SHIPPED | OUTPATIENT
Start: 2018-08-28 | End: 2018-10-28

## 2018-08-28 RX ORDER — DULOXETIN HYDROCHLORIDE 20 MG/1
20 CAPSULE, DELAYED RELEASE ORAL DAILY
Refills: 1 | COMMUNITY
Start: 2018-06-26 | End: 2018-09-28

## 2018-08-28 RX ORDER — FUROSEMIDE 20 MG/1
20 TABLET ORAL DAILY
COMMUNITY
End: 2018-11-19

## 2018-08-28 RX ORDER — TRAZODONE HYDROCHLORIDE 50 MG/1
100 TABLET ORAL
Qty: 180 TAB | Refills: 1 | Status: SHIPPED | OUTPATIENT
Start: 2018-08-28 | End: 2019-01-30 | Stop reason: SDUPTHER

## 2018-08-28 RX ORDER — ALPRAZOLAM 1 MG/1
1 TABLET ORAL 3 TIMES DAILY
Qty: 90 TAB | Refills: 2 | Status: ON HOLD | OUTPATIENT
Start: 2018-08-28 | End: 2018-11-28

## 2018-08-28 RX ORDER — MIDODRINE HYDROCHLORIDE 5 MG/1
5 TABLET ORAL 3 TIMES DAILY
Qty: 270 TAB | Refills: 3 | Status: SHIPPED | OUTPATIENT
Start: 2018-08-28 | End: 2018-10-28

## 2018-08-28 ASSESSMENT — ENCOUNTER SYMPTOMS
DIZZINESS: 0
RESPIRATORY NEGATIVE: 1
SHORTNESS OF BREATH: 0
HEMOPTYSIS: 0
BRUISES/BLEEDS EASILY: 0
CARDIOVASCULAR NEGATIVE: 1
GASTROINTESTINAL NEGATIVE: 1
ORTHOPNEA: 0
FEVER: 0
PALPITATIONS: 0
NEUROLOGICAL NEGATIVE: 1
COUGH: 0
LOSS OF CONSCIOUSNESS: 0
CLAUDICATION: 0
MUSCULOSKELETAL NEGATIVE: 1
SORE THROAT: 0
CONSTITUTIONAL NEGATIVE: 1
CHILLS: 0
WEAKNESS: 0
PND: 0
STRIDOR: 0
WHEEZING: 0
EYES NEGATIVE: 1
SPUTUM PRODUCTION: 0

## 2018-08-28 NOTE — PROGRESS NOTES
"Chief Complaint   Patient presents with   • Pulmonary Hypertension     F/V: 6 MO       Subjective:   Roxana Cuba is a 58 y.o. female who presents today as a follow-up for her pulmonary hypertension.  She was recently in the hospital for hypotension.  She was started on Midrin for hypotension at night.  She still reports approximately NYHA class I symptoms.  She did undergo a Evon-en-Y surgery for chronic aspiration.  This is now working and she is lost approximately 30 pounds.  Her blood pressure is running low at night.  She checks before she goes to bed.  That is when she takes her tadalafil.  She is also taking Lasix 40 mg/day.  She is taking for 10 mg of midodrine at night but not during the day.    Past Medical History:   Diagnosis Date   • Breath shortness    • Bronchitis      2016   • Cardiomegaly    • Chronic fatigue and malaise    • Cirrhosis ([facility]) December 2011    Status post liver transplant at [facility] [place]   • CKD (chronic kidney disease) stage 3, GFR 30-59 ml/min    • Diabetes ([facility])     reports hx of, resolved w/weight loss. Reports still checking bloodsugars twice daily and using insulin PRN   • Fracture of left foot    • GERD (gastroesophageal reflux disease)         • H/O Clostridium difficile infection 02-17-17    reports \"16 months ago\". Current stool sample 01-26-17 neg   • Hypothyroid    • Low back pain 02-17-17    and left foot, 7/10   • Mild aortic regurgitation and aortic valve sclerosis     • On home oxygen therapy     4 liters, Dr. Gaming   • Platelet disorder ([facility])     low platelets   • Pneumonia     aspiration,    • Psychiatric disorder     Mood disorder   • Pulmonary hypertension ([facility])        • S/P cholecystectomy    • Small bowel obstruction ([facility])     01-   • Splenomegaly    • VRE (vancomycin-resistant Enterococci)     02-17-17, pt declines knowledge of this     Past Surgical History:   Procedure Laterality Date   • OTHER  12/11/2017    paniculectomy   • COLONOSCOPY N/A " 3/27/2017    Procedure: COLONOSCOPY;  Surgeon: Osman Matt M.D.;  Location: SURGERY SAME DAY [facility];  Service:    • GASTROSCOPY N/A 3/27/2017    Procedure: GASTROSCOPY;  Surgeon: Osman Matt M.D.;  Location: SURGERY SAME DAY [facility];  Service:    • BREAST BIOPSY Left 2/21/2017    Procedure: BREAST BIOPSY - WIRE LOCALIZED EXCISONAL ;  Surgeon: Jane Zhao M.D.;  Location: SURGERY SAME DAY [facility];  Service:    • LUNG BIOPSY OPEN  11/2016   • OTHER ABDOMINAL SURGERY      Gasric Sleeve   • BONE MARROW ASPIRATION  3/16/2015    Performed by Marlena Hi M.D. at ENDOSCOPY [facility]   • BONE MARROW BIOPSY, NDL/TROCAR  3/16/2015    Performed by Marlena Hi M.D. at [facility]   • RECOVERY  3/31/2014    Performed by [facility] at SURGERY [facility]   • RECOVERY  3/24/2014    Performed by [facility] at SURGERY SAME DAY [facility]   • RECOVERY  1/21/2014    Performed by [facility] at SURGERY SAME DAY [facility]   • BRONCHOSCOPY-ENDO  11/15/2013    Performed by Ha Perez M.D. at ENDOSCOPY [facility]   • RECOVERY  2/27/2013    Performed by [facility] at SURGERY SAME DAY [facility]   • BONE MARROW ASPIRATION  12/31/2012    Performed by Josemanuel Real M.D. at [facility]   • BONE MARROW BIOPSY, NDL/TROCAR  12/31/2012    Performed by Josemanuel Real M.D. at ENDOSCOPY [facility]   • GASTROSCOPY  9/28/2012    Performed by Aravind Richards M.D. at SURGERY [facility]   • SIGMOIDOSCOPY FLEX  9/26/2012    Performed by Kristopher Cardoso M.D. at ENDOSCOPY [facility]   • BRONCHOSCOPY-ENDO  6/19/2012    Performed by MARLENA MURILLO at [facility]   • BRONCHOSCOPY-ENDO  5/29/2012    Performed by SUYAPA CAMARENA at [facility]   • OTHER ABDOMINAL SURGERY  December 2011    Liver transplant at [facility] [place] by Dr. Canada.   • GASTROSCOPY-ENDO  3/12/2010    Performed  by CAMELIA SAMANO at ENDOSCOPY [facility]   • EXAM UNDER ANESTHESIA  11/11/2009    Performed by BIRD ABDI at SURGERY [facility]   • HEMORRHOIDECTOMY  11/11/2009    Performed by BIRD ABDI at SURGERY [facility]   • KELBY BY LAPAROSCOPY  9/19/2009    Performed by BIRD ABDI at SURGERY [facility]   • CARPAL TUNNEL RELEASE          • GASTRIC BYPASS LAPAROSCOPIC     • HYSTERECTOMY, TOTAL ABDOMINAL          • OTHER      Breast reduction   • PB ANESTH,NOSE,SINUS SURGERY     • TONSILLECTOMY       Family History   Problem Relation Age of Onset   • Other Father         Unknown (dead 10 years)   • Diabetes Father    • Heart Disease Father    • Hypertension Father    • Hyperlipidemia Father    • Stroke Father    • Non-contributory Mother    • Hyperlipidemia Mother    • Alcohol/Drug Mother    • Cancer Paternal Aunt    • Alcohol/Drug Maternal Grandmother    • Alcohol/Drug Maternal Grandfather      Social History     Social History   • Marital status:      Spouse name: N/A   • Number of children: N/A   • Years of education: N/A     Occupational History   • Not on file.     Social History Main Topics   • Smoking status: Never Smoker   • Smokeless tobacco: Never Used      Comment: avoid all tobacco products   • Alcohol use No      Comment: 05/2009 quit drinking   • Drug use: No   • Sexual activity: Not on file     Other Topics Concern   • Not on file     Social History Narrative   • No narrative on file     Allergies   Allergen Reactions   • Rhubarb Anaphylaxis   • Organic Nitrates      Nitroglycerin products should be avoided with the use of PDE-5 inhibitors as the combination can result in severe hypotension.   • Vancomycin Hcl      Causes red man syndrome when infused to fast       Outpatient Encounter Prescriptions as of 8/28/2018   Medication Sig Dispense Refill   • DULoxetine (CYMBALTA) 20 MG Cap DR Particles Take 20 mg by mouth every day.  1   • midodrine (PROAMATINE) 5 MG Tab Take 1  Tab by mouth 3 times a day. 270 Tab 3   • pravastatin (PRAVACHOL) 20 MG Tab Take 1 Tab by mouth every day. 90 Tab 2   • methylnaltrexone (RELISTOR) 12 MG/0.6ML Solution Inject 0.6 mL as instructed every day. 84 Vial 3   • Tadalafil, PAH, (ADCIRCA) 20 MG Tab Take 40 mg by mouth every bedtime. 180 Tab 3   • Calcium-Magnesium-Vitamin D (CITRACAL CALCIUM+D PO) Take  by mouth.     • cyclobenzaprine (FLEXERIL) 10 MG Tab Take 10 mg by mouth 3 times a day as needed.     • fluticasone (FLONASE) 50 MCG/ACT nasal spray Spray 1 Spray in nose every day.     • ferrous sulfate (FEOSOL) 220 (44 Fe) MG/5ML Elixir Take 5 mL by mouth every day. 1 Bottle 10   • ALPRAZolam (XANAX) 1 MG Tab Take 1 Tab by mouth 3 times a day for 90 days. 90 Tab 2   • lactulose 10 GM/15ML Solution TAKE 30 ML BY MOUTH 2 TIMES A DAY. 473 mL 3   • gabapentin (NEURONTIN) 600 MG tablet TAKE 1 TABLET BY MOUTH 3 TIMES A DAY. 90 Tab 2   • bisacodyl (DULCOLAX) 10 MG Suppos Insert 1 Suppository in rectum 1 time daily as needed (constipation). 30 Suppository 3   • docusate sodium (COLACE) 100 MG Cap Take 100 mg by mouth 2 times a day.     • predniSONE (DELTASONE) 1 MG Tab Take 7 mg by mouth every day. Pt uses a 5 mg and 2- 1 mg tabs to equal a daily dose of 7 mg     • traZODone (DESYREL) 100 MG Tab Take 100 mg by mouth at bedtime as needed.     • morphine 10 MG/5ML Solution Take 15-20 mg by mouth every 6 hours. Scheduled     • Oxymetazoline HCl (AFRIN NASAL SPRAY NA) Spray 1-2 Sprays in nose every bedtime.     • Calcium Citrate-Vitamin D 315-250 MG-UNIT Tab Take 1 Tab by mouth 2 Times a Day.     • SPIRIVA HANDIHALER 18 MCG Cap INHALE 1 CAP BY MOUTH DAILY. 30 Cap 6   • omeprazole (PRILOSEC) 40 MG delayed-release capsule Take 1 Cap by mouth every day. 90 Cap 0   • aspirin 81 MG tablet Take 1 Tab by mouth every day. 90 Tab 0   • polyethylene glycol 3350 (MIRALAX) Powder Take 17 g by mouth every day.     • Carboxymethylcell-Hypromellose (GENTEAL) 0.25-0.3 % Gel Place 0.3  mg in both eyes every bedtime.     • furosemide (LASIX) 40 MG Tab TAKE 1 TAB BY MOUTH EVERY DAY. 30 Tab 6   • Linaclotide (LINZESS) 290 MCG Cap Take 1 Cap by mouth every day. 90 Cap 3   • sertraline (ZOLOFT) 100 MG Tab Take 1 Tab by mouth every bedtime. 90 Tab 4   • levothyroxine (SYNTHROID) 137 MCG Tab Take 1 Tab by mouth Every morning on an empty stomach. 90 Tab 3   • NON SPECIFIED Take 1 Cap by mouth every evening. Bariatric Dietary Supplment      • tacrolimus (PROGRAF) 0.5 MG Cap Take 1.5 mg by mouth 2 Times a Day. Uses a 1 mg and a 0.5 mg tab to equal a     • ambrisentan (LETAIRIS) 10 MG tablet Take 1 Tab by mouth every day. 30 Tab 11   • ascorbic acid (ASCORBIC ACID) 500 MG Tab Take 500 mg by mouth every day.     • ondansetron (ZOFRAN ODT) 4 MG TABLET DISPERSIBLE Take 1 Tab by mouth every 8 hours as needed for Nausea/Vomiting. Nausea and vomiting 270 Tab 4   • mycophenolate (CELLCEPT) 250 MG Cap Take 500 mg by mouth 2 times a day.     • [DISCONTINUED] LETAIRIS 10 MG tablet TAKE 1 TABLET BY MOUTH EVERY DAY 30 Tab PRN   • [DISCONTINUED] furosemide (LASIX) 40 MG Tab TAKE 1 TAB BY MOUTH EVERY DAY. 30 Tab 3   • [DISCONTINUED] insulin glargine (LANTUS SOLOSTAR) 100 UNIT/ML Solution Pen-injector injection Inject 30 Units as instructed 2 times a day.     • [DISCONTINUED] midodrine (PROAMATINE) 5 MG Tab Take 1 Tab by mouth 3 times a day. 270 Tab 1   • Wheat Dextrin (BENEFIBER) Powder Take 1 Dose by mouth every day.       No facility-administered encounter medications on file as of 8/28/2018.      Review of Systems   Constitutional: Negative.  Negative for chills, fever and malaise/fatigue.   HENT: Negative.  Negative for sore throat.    Eyes: Negative.    Respiratory: Negative.  Negative for cough, hemoptysis, sputum production, shortness of breath, wheezing and stridor.    Cardiovascular: Negative.  Negative for chest pain, palpitations, orthopnea, claudication, leg swelling and PND.   Gastrointestinal: Negative.   "  Genitourinary: Negative.    Musculoskeletal: Negative.    Skin: Negative.    Neurological: Negative.  Negative for dizziness, loss of consciousness and weakness.   Endo/Heme/Allergies: Negative.  Does not bruise/bleed easily.   All other systems reviewed and are negative.       Objective:   /60   Pulse 70   Ht 1.702 m (5' 7\")   Wt 65.3 kg (144 lb)   SpO2 96%   BMI 22.55 kg/m²     Physical Exam   Constitutional: She appears well-developed and well-nourished. No distress.   HENT:   Head: Normocephalic and atraumatic.   Right Ear: External ear normal.   Left Ear: External ear normal.   Nose: Nose normal.   Mouth/Throat: No oropharyngeal exudate.   Eyes: Pupils are equal, round, and reactive to light. Conjunctivae and EOM are normal. Right eye exhibits no discharge. Left eye exhibits no discharge. No scleral icterus.   Neck: Neck supple. No JVD present.   Cardiovascular: Normal rate, regular rhythm and intact distal pulses.  Exam reveals no gallop and no friction rub.    No murmur heard.  Pulmonary/Chest: Effort normal. No stridor. No respiratory distress. She has no wheezes. She has no rales. She exhibits no tenderness.   Abdominal: Soft. She exhibits no distension. There is no guarding.   Musculoskeletal: Normal range of motion. She exhibits no edema, tenderness or deformity.   Neurological: She is alert. She has normal reflexes. She displays normal reflexes. No cranial nerve deficit. She exhibits normal muscle tone. Coordination normal.   Skin: Skin is warm and dry. No rash noted. She is not diaphoretic. No erythema. No pallor.   Psychiatric: She has a normal mood and affect. Her behavior is normal. Judgment and thought content normal.   Nursing note and vitals reviewed.      Assessment:     1. Randolph's disease (HCC)  midodrine (PROAMATINE) 5 MG Tab   2. Other specified hypotension  midodrine (PROAMATINE) 5 MG Tab   3. Chronic respiratory failure with hypoxia (HCC)     4. CKD (chronic kidney disease) " stage 3, GFR 30-59 ml/min- unclear cause, probably multifactorial     5. Mild aortic regurgitation and aortic valve sclerosis  ECHOCARDIOGRAM COMP W/O CONT   6. MGUS (monoclonal gammopathy of unknown significance)     7. Ostium secundum type atrial septal defect, S/P percutaneous closure     8. Pulmonary hypertension (HCC)  ECHOCARDIOGRAM COMP W/O CONT   9. Pure hypercholesterolemia     10. Splenomegaly     11. High risk medication use     12. Chronic pain syndrome     13. Bradycardia     14. H/O non-ST elevation myocardial infarction (NSTEMI)     15. Hepatopulmonary syndrome (HCC)         Medical Decision Making:  Today's Assessment / Status / Plan:        57-year-old female with pulmonary hypertension on medications and stable at this point. Because of her new hypotension I did tell her to reduce her Lasix to 20 mg/day for 40 and then to return to 40 she gets return of her lower extremity edema.  Additionally will have her reduce her tadalafil to 20 mg/day.  We will see her back in 3 months.     1. PAH, WHO 1, WHO2, WHO 3 possible  - cont ambrisentan 10 daily  - reduce tidalifil to 20 daily  - reduce lasix to 20 daily      2. ASD s/p Closure    - clinical f/u     3. Aortic Stenosis, mild     - clinical f/u    Thank for you allowing me to take part in your patient's care, please call should you have any questions or would like to discuss this patient.

## 2018-08-28 NOTE — TELEPHONE ENCOUNTER
Left message for patient to call the office back regarding scheduling AWV.  Please transfer patient returned call to [phone].

## 2018-08-28 NOTE — ASSESSMENT & PLAN NOTE
Illness: 3 weeks of illness including: nasal congestion, clear/whitish rhinorrhea, sore throat, laryngitis ,  Sinus pain and pressure: right maxillary.  Some tactile fever  Treatments tried: none   Since onset, symptoms are same   Similarly ill exposures: yes  Medical history negative for asthma  She  reports that she has never smoked. She has never used smokeless tobacco.

## 2018-08-28 NOTE — LETTER
"     [facility]   [address]  [place] [address]  Phone: [phone]  Fax: [phone]              Roxana Cuba  1960    Encounter Date: 8/28/2018    Maxwell Phan M.D.          PROGRESS NOTE:  Chief Complaint   Patient presents with   • Pulmonary Hypertension     F/V: 6 MO       Subjective:   Roxana Cuba is a 58 y.o. female who presents today as a follow-up for her pulmonary hypertension.  She was recently in the hospital for hypotension.  She was started on Midrin for hypotension at night.  She still reports approximately NYHA class I symptoms.  She did undergo a Evon-en-Y surgery for chronic aspiration.  This is now working and she is lost approximately 30 pounds.  Her blood pressure is running low at night.  She checks before she goes to bed.  That is when she takes her tadalafil.  She is also taking Lasix 40 mg/day.  She is taking for 10 mg of midodrine at night but not during the day.    Past Medical History:   Diagnosis Date   • Breath shortness    • Bronchitis      2016   • Cardiomegaly    • Chronic fatigue and malaise    • Cirrhosis (HCC) December 2011    [place] [facility] [place]   • CKD (chronic kidney disease) stage 3, GFR 30-59 ml/min    • Diabetes (HCC)     reports hx of, resolved w/weight loss. Reports still checking bloodsugars twice daily and using insulin PRN   • Fracture of left foot    • GERD (gastroesophageal reflux disease)         • H/O Clostridium difficile infection 02-17-17    reports \"16 months ago\". Current stool sample 01-26-17 neg   • Hypothyroid    • Low back pain 02-17-17    and left foot, 7/10   • Mild aortic regurgitation and aortic valve sclerosis     • On home oxygen therapy     4 liters, Dr. Gaming   • Platelet disorder (HCC)     low platelets   • Pneumonia     aspiration,    • Psychiatric disorder     Mood disorder   • Pulmonary hypertension (HCC)        • S/P cholecystectomy    "   • Small bowel obstruction (HCC)     01-   • Splenomegaly    • VRE (vancomycin-resistant Enterococci)     02-17-17, pt declines knowledge of this     Past Surgical History:   Procedure Laterality Date   • OTHER  12/11/2017    paniculectomy   • COLONOSCOPY N/A 3/27/2017    Procedure: COLONOSCOPY;  Surgeon: Osman Matt M.D.;  Location: SURGERY SAME DAY [facility];  Service:    • GASTROSCOPY N/A 3/27/2017    Procedure: GASTROSCOPY;  Surgeon: Osman Matt M.D.;  Location: SURGERY SAME DAY [facility];  Service:    • BREAST BIOPSY Left 2/21/2017    Procedure: BREAST BIOPSY - WIRE LOCALIZED EXCISONAL ;  Surgeon: Jane Zhao M.D.;  Location: SURGERY SAME DAY [facility];  Service:    • LUNG BIOPSY OPEN  11/2016   • OTHER ABDOMINAL SURGERY      Gasric Sleeve   • BONE MARROW ASPIRATION  3/16/2015    Performed by Freddie Hi M.D. at ENDOSCOPY [facility]   • BONE MARROW BIOPSY, NDL/TROCAR  3/16/2015    Performed by Freddie Hi M.D. at ENDOSCOPY [facility]   • RECOVERY  3/31/2014    Performed by [facility] at SURGERY [facility]   • RECOVERY  3/24/2014    Performed by [facility] at SURGERY SAME DAY [facility]   • RECOVERY  1/21/2014    Performed by [facility] at SURGERY SAME DAY [facility]   • BRONCHOSCOPY-ENDO  11/15/2013    Performed by Ha Perez M.D. at ENDOSCOPY [facility]   • RECOVERY  2/27/2013    Performed by [facility] at SURGERY SAME DAY [facility]   • BONE MARROW ASPIRATION  12/31/2012    Performed by Josemanuel Real M.D. at ENDOSCOPY [facility]   • BONE MARROW BIOPSY, NDL/TROCAR  12/31/2012    Performed by Josemanuel Real M.D. at ENDOSCOPY [facility]   • GASTROSCOPY  9/28/2012    Performed by Aravind Richards M.D. at [facility]   • SIGMOIDOSCOPY FLEX  9/26/2012    Performed by Kristopher Cardoso M.D. at [facility]   • BRONCHOSCOPY-ENDO  6/19/2012    Performed by LIDIA  MARLENA BAUTISTA at ENDOSCOPY [facility]   • BRONCHOSCOPY-ENDO  5/29/2012    Performed by SUYAPA CAMARENA at ENDOSCOPY [facility]   • OTHER ABDOMINAL SURGERY  December 2011    [place] [facility] [place].   • GASTROSCOPY-ENDO  3/12/2010    Performed by CAMELIA SAMANO at ENDOSCOPY [facility]   • EXAM UNDER ANESTHESIA  11/11/2009    Performed by BIRD ABDI at SURGERY [facility]   • HEMORRHOIDECTOMY  11/11/2009    Performed by BIRD ABDI at SURGERY [facility]   • KELBY BY LAPAROSCOPY  9/19/2009    Performed by BIRD ABDI at SURGERY [facility]   • CARPAL TUNNEL RELEASE          • GASTRIC BYPASS LAPAROSCOPIC     • HYSTERECTOMY, TOTAL ABDOMINAL          • OTHER      Breast reduction   • PB ANESTH,NOSE,SINUS SURGERY     • TONSILLECTOMY       Family History   Problem Relation Age of Onset   • Other Father         Unknown (dead 10 years)   • Diabetes Father    • Heart Disease Father    • Hypertension Father    • Hyperlipidemia Father    • Stroke Father    • Non-contributory Mother    • Hyperlipidemia Mother    • Alcohol/Drug Mother    • Cancer Paternal Aunt    • Alcohol/Drug Maternal Grandmother    • Alcohol/Drug Maternal Grandfather      Social History     Social History   • Marital status:      Spouse name: N/A   • Number of children: N/A   • Years of education: N/A     Occupational History   • Not on file.     Social History Main Topics   • Smoking status: Never Smoker   • Smokeless tobacco: Never Used      Comment: avoid all tobacco products   • Alcohol use No      Comment: 05/2009 quit drinking   • Drug use: No   • Sexual activity: Not on file     Other Topics Concern   • Not on file     Social History Narrative   • No narrative on file     Allergies   Allergen Reactions   • Rhubarb Anaphylaxis   • Organic Nitrates      Nitroglycerin products should be avoided with the use of PDE-5 inhibitors as the combination can result in severe hypotension.   • Vancomycin  Hcl      Causes red man syndrome when infused to fast       Outpatient Encounter Prescriptions as of 8/28/2018   Medication Sig Dispense Refill   • DULoxetine (CYMBALTA) 20 MG Cap DR Particles Take 20 mg by mouth every day.  1   • midodrine (PROAMATINE) 5 MG Tab Take 1 Tab by mouth 3 times a day. 270 Tab 3   • pravastatin (PRAVACHOL) 20 MG Tab Take 1 Tab by mouth every day. 90 Tab 2   • methylnaltrexone (RELISTOR) 12 MG/0.6ML Solution Inject 0.6 mL as instructed every day. 84 Vial 3   • Tadalafil, PAH, (ADCIRCA) 20 MG Tab Take 40 mg by mouth every bedtime. 180 Tab 3   • Calcium-Magnesium-Vitamin D (CITRACAL CALCIUM+D PO) Take  by mouth.     • cyclobenzaprine (FLEXERIL) 10 MG Tab Take 10 mg by mouth 3 times a day as needed.     • fluticasone (FLONASE) 50 MCG/ACT nasal spray Spray 1 Spray in nose every day.     • ferrous sulfate (FEOSOL) 220 (44 Fe) MG/5ML Elixir Take 5 mL by mouth every day. 1 Bottle 10   • ALPRAZolam (XANAX) 1 MG Tab Take 1 Tab by mouth 3 times a day for 90 days. 90 Tab 2   • lactulose 10 GM/15ML Solution TAKE 30 ML BY MOUTH 2 TIMES A DAY. 473 mL 3   • gabapentin (NEURONTIN) 600 MG tablet TAKE 1 TABLET BY MOUTH 3 TIMES A DAY. 90 Tab 2   • bisacodyl (DULCOLAX) 10 MG Suppos Insert 1 Suppository in rectum 1 time daily as needed (constipation). 30 Suppository 3   • docusate sodium (COLACE) 100 MG Cap Take 100 mg by mouth 2 times a day.     • predniSONE (DELTASONE) 1 MG Tab Take 7 mg by mouth every day. Pt uses a 5 mg and 2- 1 mg tabs to equal a daily dose of 7 mg     • traZODone (DESYREL) 100 MG Tab Take 100 mg by mouth at bedtime as needed.     • morphine 10 MG/5ML Solution Take 15-20 mg by mouth every 6 hours. Scheduled     • Oxymetazoline HCl (AFRIN NASAL SPRAY NA) Spray 1-2 Sprays in nose every bedtime.     • Calcium Citrate-Vitamin D 315-250 MG-UNIT Tab Take 1 Tab by mouth 2 Times a Day.     • SPIRIVA HANDIHALER 18 MCG Cap INHALE 1 CAP BY MOUTH DAILY. 30 Cap 6   • omeprazole (PRILOSEC) 40 MG  delayed-release capsule Take 1 Cap by mouth every day. 90 Cap 0   • aspirin 81 MG tablet Take 1 Tab by mouth every day. 90 Tab 0   • polyethylene glycol 3350 (MIRALAX) Powder Take 17 g by mouth every day.     • Carboxymethylcell-Hypromellose (GENTEAL) 0.25-0.3 % Gel Place 0.3 mg in both eyes every bedtime.     • furosemide (LASIX) 40 MG Tab TAKE 1 TAB BY MOUTH EVERY DAY. 30 Tab 6   • Linaclotide (LINZESS) 290 MCG Cap Take 1 Cap by mouth every day. 90 Cap 3   • sertraline (ZOLOFT) 100 MG Tab Take 1 Tab by mouth every bedtime. 90 Tab 4   • levothyroxine (SYNTHROID) 137 MCG Tab Take 1 Tab by mouth Every morning on an empty stomach. 90 Tab 3   • NON SPECIFIED Take 1 Cap by mouth every evening. Bariatric Dietary Supplment      • tacrolimus (PROGRAF) 0.5 MG Cap Take 1.5 mg by mouth 2 Times a Day. Uses a 1 mg and a 0.5 mg tab to equal a     • ambrisentan (LETAIRIS) 10 MG tablet Take 1 Tab by mouth every day. 30 Tab 11   • ascorbic acid (ASCORBIC ACID) 500 MG Tab Take 500 mg by mouth every day.     • ondansetron (ZOFRAN ODT) 4 MG TABLET DISPERSIBLE Take 1 Tab by mouth every 8 hours as needed for Nausea/Vomiting. Nausea and vomiting 270 Tab 4   • mycophenolate (CELLCEPT) 250 MG Cap Take 500 mg by mouth 2 times a day.     • [DISCONTINUED] LETAIRIS 10 MG tablet TAKE 1 TABLET BY MOUTH EVERY DAY 30 Tab PRN   • [DISCONTINUED] furosemide (LASIX) 40 MG Tab TAKE 1 TAB BY MOUTH EVERY DAY. 30 Tab 3   • [DISCONTINUED] insulin glargine (LANTUS SOLOSTAR) 100 UNIT/ML Solution Pen-injector injection Inject 30 Units as instructed 2 times a day.     • [DISCONTINUED] midodrine (PROAMATINE) 5 MG Tab Take 1 Tab by mouth 3 times a day. 270 Tab 1   • Wheat Dextrin (BENEFIBER) Powder Take 1 Dose by mouth every day.       No facility-administered encounter medications on file as of 8/28/2018.      Review of Systems   Constitutional: Negative.  Negative for chills, fever and malaise/fatigue.   HENT: Negative.  Negative for sore throat.    Eyes:  "Negative.    Respiratory: Negative.  Negative for cough, hemoptysis, sputum production, shortness of breath, wheezing and stridor.    Cardiovascular: Negative.  Negative for chest pain, palpitations, orthopnea, claudication, leg swelling and PND.   Gastrointestinal: Negative.    Genitourinary: Negative.    Musculoskeletal: Negative.    Skin: Negative.    Neurological: Negative.  Negative for dizziness, loss of consciousness and weakness.   Endo/Heme/Allergies: Negative.  Does not bruise/bleed easily.   All other systems reviewed and are negative.       Objective:   /60   Pulse 70   Ht 1.702 m (5' 7\")   Wt 65.3 kg (144 lb)   SpO2 96%   BMI 22.55 kg/m²      Physical Exam   Constitutional: She appears well-developed and well-nourished. No distress.   HENT:   Head: Normocephalic and atraumatic.   Right Ear: External ear normal.   Left Ear: External ear normal.   Nose: Nose normal.   Mouth/Throat: No oropharyngeal exudate.   Eyes: Pupils are equal, round, and reactive to light. Conjunctivae and EOM are normal. Right eye exhibits no discharge. Left eye exhibits no discharge. No scleral icterus.   Neck: Neck supple. No JVD present.   Cardiovascular: Normal rate, regular rhythm and intact distal pulses.  Exam reveals no gallop and no friction rub.    No murmur heard.  Pulmonary/Chest: Effort normal. No stridor. No respiratory distress. She has no wheezes. She has no rales. She exhibits no tenderness.   Abdominal: Soft. She exhibits no distension. There is no guarding.   Musculoskeletal: Normal range of motion. She exhibits no edema, tenderness or deformity.   Neurological: She is alert. She has normal reflexes. She displays normal reflexes. No cranial nerve deficit. She exhibits normal muscle tone. Coordination normal.   Skin: Skin is warm and dry. No rash noted. She is not diaphoretic. No erythema. No pallor.   Psychiatric: She has a normal mood and affect. Her behavior is normal. Judgment and thought content " normal.   Nursing note and vitals reviewed.      Assessment:     1. 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HCC)  midodrine (PROAMATINE) 5 MG Tab   2. Other specified hypotension  midodrine (PROAMATINE) 5 MG Tab   3. Chronic respiratory failure with hypoxia (HCC)     4. CKD (chronic kidney disease) stage 3, GFR 30-59 ml/min- unclear cause, probably multifactorial     5. Mild aortic regurgitation and aortic valve sclerosis  ECHOCARDIOGRAM COMP W/O CONT   6. MGUS (monoclonal gammopathy of unknown significance)     7. Ostium secundum type atrial septal defect, S/P percutaneous closure     8. Pulmonary hypertension (HCC)  ECHOCARDIOGRAM COMP W/O CONT   9. Pure hypercholesterolemia     10. Splenomegaly     11. High risk medication use     12. Chronic pain syndrome     13. Bradycardia     14. H/O non-ST elevation myocardial infarction (NSTEMI)     15. Hepatopulmonary syndrome (HCC)         Medical Decision Making:  Today's Assessment / Status / Plan:        57-year-old female with pulmonary hypertension on medications and stable at this point. Because of her new hypotension I did tell her to reduce her Lasix to 20 mg/day for 40 and then to return to 40 she gets return of her lower extremity edema.  Additionally will have her reduce her tadalafil to 20 mg/day.  We will see her back in 3 months.     1. PAH, WHO 1, WHO2, WHO 3 possible  - cont ambrisentan 10 daily  - reduce tidalifil to 20 daily  - reduce lasix to 20 daily      2. ASD s/p Closure    - clinical f/u     3. Aortic Stenosis, mild     - clinical f/u    Thank for you allowing me to take part in your patient's care, please call should you have any questions or would like to discuss this patient.         Elisa Phillip M.D.  [address]  [address]  [facility] [address]  VIA In Basket

## 2018-09-04 NOTE — PROGRESS NOTES
Subjective:     Chief Complaint   Patient presents with   • Follow-Up       Roxana Cuba is a 58 y.o. female here today for evaluation and management of:    Viral upper respiratory tract infection  Illness: 3 weeks of illness including: nasal congestion, clear/whitish rhinorrhea, sore throat, laryngitis ,  Sinus pain and pressure: right maxillary.  Some tactile fever  Treatments tried: none   Since onset, symptoms are same   Similarly ill exposures: yes  Medical history negative for asthma  She  reports that she has never smoked. She has never used smokeless tobacco.      Chronic obstructive pulmonary disease with acute exacerbation (CMS-HCC) (HCC)  Stable. Currently using inhalers as prescribed.  She denies side effects.  Denies recent or current exacerbation.   Denies current shortness of breath, chest pain, or cough.  She is on oxygen therapy.        DUC (generalized anxiety disorder)  Stable. Currently taking sertraline 100 mg and alprazolam TID as prescribed.  Patient is under more stress as her  recently lost his job.  Denies side effects and is tolerating well.  Mood is improved with current medication and therapy.    Patient denies SI/HI.  Depression Screen (PHQ-2/PHQ-9) 11/10/2017 4/27/2018 4/29/2018   PHQ-2 Total Score 0 0 3   PHQ-2 Total Score - - -   PHQ-2 Total Score - - -   PHQ-9 Total Score 0 - 11              Allergies   Allergen Reactions   • Rhubarb Anaphylaxis   • Organic Nitrates      Nitroglycerin products should be avoided with the use of PDE-5 inhibitors as the combination can result in severe hypotension.   • Vancomycin Hcl      Causes red man syndrome when infused to fast         Current medicines (including changes today)  Current Outpatient Prescriptions   Medication Sig Dispense Refill   • midodrine (PROAMATINE) 5 MG Tab Take 1 Tab by mouth 3 times a day. 270 Tab 3   • furosemide (LASIX) 20 MG Tab Take 20 mg by mouth 2 times a day.     • lactulose 10 GM/15ML Solution Take 30 mL  by mouth 2 times a day. 1800 mL 3   • traZODone (DESYREL) 50 MG Tab Take 2 Tabs by mouth every bedtime. 180 Tab 1   • ALPRAZolam (XANAX) 1 MG Tab Take 1 Tab by mouth 3 times a day for 90 days. 90 Tab 2   • Wheat Dextrin (BENEFIBER) Powder Take 1 Dose by mouth every day.     • DULoxetine (CYMBALTA) 20 MG Cap DR Particles Take 20 mg by mouth every day.  1   • pravastatin (PRAVACHOL) 20 MG Tab Take 1 Tab by mouth every day. 90 Tab 2   • methylnaltrexone (RELISTOR) 12 MG/0.6ML Solution Inject 0.6 mL as instructed every day. 84 Vial 3   • Tadalafil, PAH, (ADCIRCA) 20 MG Tab Take 40 mg by mouth every bedtime. (Patient taking differently: Take 20 mg by mouth every bedtime.) 180 Tab 3   • Calcium-Magnesium-Vitamin D (CITRACAL CALCIUM+D PO) Take  by mouth.     • cyclobenzaprine (FLEXERIL) 10 MG Tab Take 10 mg by mouth 3 times a day as needed.     • fluticasone (FLONASE) 50 MCG/ACT nasal spray Spray 1 Spray in nose every day.     • ferrous sulfate (FEOSOL) 220 (44 Fe) MG/5ML Elixir Take 5 mL by mouth every day. 1 Bottle 10   • gabapentin (NEURONTIN) 600 MG tablet TAKE 1 TABLET BY MOUTH 3 TIMES A DAY. 90 Tab 2   • bisacodyl (DULCOLAX) 10 MG Suppos Insert 1 Suppository in rectum 1 time daily as needed (constipation). 30 Suppository 3   • docusate sodium (COLACE) 100 MG Cap Take 100 mg by mouth 2 times a day.     • predniSONE (DELTASONE) 1 MG Tab Take 7 mg by mouth every day. Pt uses a 5 mg and 2- 1 mg tabs to equal a daily dose of 7 mg     • morphine 10 MG/5ML Solution Take 15-20 mg by mouth every 6 hours. Scheduled     • Oxymetazoline HCl (AFRIN NASAL SPRAY NA) Spray 1-2 Sprays in nose every bedtime.     • Calcium Citrate-Vitamin D 315-250 MG-UNIT Tab Take 1 Tab by mouth 2 Times a Day.     • SPIRIVA HANDIHALER 18 MCG Cap INHALE 1 CAP BY MOUTH DAILY. 30 Cap 6   • omeprazole (PRILOSEC) 40 MG delayed-release capsule Take 1 Cap by mouth every day. 90 Cap 0   • aspirin 81 MG tablet Take 1 Tab by mouth every day. 90 Tab 0   •  polyethylene glycol 3350 (MIRALAX) Powder Take 17 g by mouth every day.     • Carboxymethylcell-Hypromellose (GENTEAL) 0.25-0.3 % Gel Place 0.3 mg in both eyes every bedtime.     • furosemide (LASIX) 40 MG Tab TAKE 1 TAB BY MOUTH EVERY DAY. 30 Tab 6   • Linaclotide (LINZESS) 290 MCG Cap Take 1 Cap by mouth every day. 90 Cap 3   • sertraline (ZOLOFT) 100 MG Tab Take 1 Tab by mouth every bedtime. 90 Tab 4   • levothyroxine (SYNTHROID) 137 MCG Tab Take 1 Tab by mouth Every morning on an empty stomach. 90 Tab 3   • NON SPECIFIED Take 1 Cap by mouth every evening. Bariatric Dietary Supplment      • tacrolimus (PROGRAF) 0.5 MG Cap Take 1.5 mg by mouth 2 Times a Day. Uses a 1 mg and a 0.5 mg tab to equal a     • ambrisentan (LETAIRIS) 10 MG tablet Take 1 Tab by mouth every day. 30 Tab 11   • ascorbic acid (ASCORBIC ACID) 500 MG Tab Take 500 mg by mouth every day.     • ondansetron (ZOFRAN ODT) 4 MG TABLET DISPERSIBLE Take 1 Tab by mouth every 8 hours as needed for Nausea/Vomiting. Nausea and vomiting 270 Tab 4   • mycophenolate (CELLCEPT) 250 MG Cap Take 500 mg by mouth 2 times a day.       No current facility-administered medications for this visit.        She  has a past medical history of Breath shortness; Bronchitis ( ); Cardiomegaly; Chronic fatigue and malaise; Cirrhosis ([facility]) (December 2011); CKD (chronic kidney disease) stage 3, GFR 30-59 ml/min; Diabetes ([facility]); Fracture of left foot; GERD (gastroesophageal reflux disease); H/O Clostridium difficile infection (02-17-17); Hypothyroid; Low back pain (02-17-17); Mild aortic regurgitation and aortic valve sclerosis ( ); On home oxygen therapy; Platelet disorder ([facility]); Pneumonia; Psychiatric disorder; Pulmonary hypertension ([facility]); S/P cholecystectomy; Small bowel obstruction ([facility]); Splenomegaly; and VRE (vancomycin-resistant Enterococci).    Patient Active Problem List    Diagnosis Date Noted   • Sepsis ([facility]) 10/29/2017     Priority: High   • Pure hypercholesterolemia  12/09/2014     Priority: High   • Mild aortic regurgitation and aortic valve sclerosis 03/17/2014     Priority: High   • Orthostatic hypotension 10/27/2017     Priority: Medium   • Immunocompromised state (HCC) 11/14/2015     Priority: Medium   • Chronic respiratory failure with hypoxia (HCC) 11/13/2014     Priority: Medium   • Vitamin D deficiency 08/26/2014     Priority: Medium   • Ostium secundum type atrial septal defect, S/P percutaneous closure 03/17/2014     Priority: Medium   • Hepatopulmonary syndrome (HCC) 03/05/2014     Priority: Medium   • CKD (chronic kidney disease) stage 3, GFR 30-59 ml/min- unclear cause, probably multifactorial 02/25/2014     Priority: Medium   • Chronic obstructive pulmonary disease with acute exacerbation (HCC) 11/14/2013     Priority: Medium   • MGUS (monoclonal gammopathy of unknown significance) 11/14/2013     Priority: Medium   • Pancytopenia (HCC) 09/13/2013     Priority: Medium   • History of pancreatitis 06/20/2012     Priority: Medium   • Pulmonary hypertension (HCC) 02/03/2015     Priority: Low   • Sarcoidosis ([facility]) 11/14/2013     Priority: Low   • DUC (generalized anxiety disorder) 11/04/2013     Priority: Low   • Primary insomnia 11/04/2013     Priority: Low   • Lower extremity weakness 06/26/2013     Priority: Low   • Status post liver transplant Dr. Canada ([facility]) 03/13/2012     Priority: Low   • Hypothyroidism, adult 03/13/2012     Priority: Low   • High risk medication use 08/28/2018   • Viral upper respiratory tract infection 08/28/2018   • History of aspiration pneumonia 02/06/2018   • Hiatal hernia 02/06/2018   • Chronic pain 01/23/2018   • Thrombocytopenia ([facility]) 01/21/2018   • [place]'s disease ([facility]) 11/03/2017   • Constipation due to opioid therapy 11/03/2017   • S/P panniculectomy 10/31/2017   • Steroid-induced hyperglycemia 10/03/2017   • Depression 07/14/2017   • SBO (small bowel obstruction) ([facility]) 07/14/2017   • History of Clostridium difficile  "infection 07/14/2017   • VRE (vancomycin-resistant Enterococci) 07/14/2017   • Herpes zoster without complication 07/14/2017   • IDDM (insulin dependent diabetes mellitus) ([facility]) 06/23/2017   • Chronic prescription benzodiazepine use 04/12/2017   • Chronic use of opiate drugs therapeutic purposes 04/12/2017   • Iron deficiency 01/20/2017   • GERD (gastroesophageal reflux disease) 01/04/2017   • Bradycardia 11/04/2016   • Other allergic rhinitis 07/22/2016   • Chronic pain syndrome 06/15/2016   • Back pain 02/22/2016   • Splenic lesion 11/14/2015   • Mild mitral regurgitation 07/14/2015   • Splenomegaly 07/14/2015   • On prednisone therapy 07/06/2015   • Chronic generalized abdominal pain 03/17/2015   • H/O non-ST elevation myocardial infarction (NSTEMI) 05/16/2013       ROS   No fever or chills.  No nausea or vomiting.  No chest pain or palpitations. .  No pain with urination or hematuria.  No black or bloody stools.       Objective:     Blood pressure 104/60, pulse 78, temperature 37.1 °C (98.8 °F), resp. rate 20, height 1.702 m (5' 7\"), weight 65.3 kg (144 lb), SpO2 96 %. Body mass index is 22.55 kg/m².   Physical Exam:  Well developed, well nourished.  Alert, oriented in no acute distress.  Eye contact is good, speech goal directed, affect calm  Eyes: conjunctiva non-injected, sclera non-icteric.  Ears: Pinna normal. TM pearly gray.   Nose: Nares are patent.  Erythematous, swollen mucosa with clear discharge  Mouth: Oral mucous membranes pink and moist with no lesions.  Mild erythema of the posterior pharynx  Neck Supple.  No adenopathy or masses in the neck or supraclavicular regions. No thyromegaly  Lungs: clear to auscultation bilaterally with good excursion. No wheezes or rhonchi  CV: regular rate and rhythm. No murmur      Assessment and Plan:   The following treatment plan was discussed    1. Chronic obstructive pulmonary disease with acute exacerbation (HCC)  This is a chronic medical condition that is " currently stable  Continue current therapy    2. Chronic respiratory failure with hypoxia (HCC)  Continue oxygen therapy    3. Viral upper respiratory tract infection  Discussed that this is most likely viral and should continue to improve    4. Recurrent sinusitis  Patient has recurrent sinusitis and would like a referral to ENT.  She has seen Dr. Erazo in the past  - REFERRAL TO ENT    5. Drug-induced constipation  Refill lactulose  - lactulose 10 GM/15ML Solution; Take 30 mL by mouth 2 times a day.  Dispense: 1800 mL; Refill: 3    6. DUC (generalized anxiety disorder)  [facility] reviewed.  Controlled substance agreement on chart.  - ALPRAZolam (XANAX) 1 MG Tab; Take 1 Tab by mouth 3 times a day for 90 days.  Dispense: 90 Tab; Refill: 2    7. Primary insomnia  Refill trazodone for nightly use  - traZODone (DESYREL) 50 MG Tab; Take 2 Tabs by mouth every bedtime.  Dispense: 180 Tab; Refill: 1    Any change or worsening of signs or symptoms, patient encouraged to follow-up or report to the emergency room for further evaluation. Patient understands and agrees.    Followup: Return in about 3 months (around 11/28/2018).

## 2018-09-04 NOTE — ASSESSMENT & PLAN NOTE
Stable. Currently taking sertraline 100 mg and alprazolam TID as prescribed.  Patient is under more stress as her  recently lost his job.  Denies side effects and is tolerating well.  Mood is improved with current medication and therapy.    Patient denies SI/HI.  Depression Screen (PHQ-2/PHQ-9) 11/10/2017 4/27/2018 4/29/2018   PHQ-2 Total Score 0 0 3   PHQ-2 Total Score - - -   PHQ-2 Total Score - - -   PHQ-9 Total Score 0 - 11

## 2018-09-11 ENCOUNTER — HOSPITAL ENCOUNTER (OUTPATIENT)
Dept: CARDIOLOGY | Facility: MEDICAL CENTER | Age: 58
End: 2018-09-11
Attending: INTERNAL MEDICINE
Payer: MEDICARE

## 2018-09-11 DIAGNOSIS — I27.20 PULMONARY HYPERTENSION (HCC): ICD-10-CM

## 2018-09-11 DIAGNOSIS — I35.1 MILD AORTIC REGURGITATION: ICD-10-CM

## 2018-09-11 LAB
LV EJECT FRACT  99904: 70
LV EJECT FRACT MOD 2C 99903: 79.86
LV EJECT FRACT MOD 4C 99902: 80.78
LV EJECT FRACT MOD BP 99901: 80.44

## 2018-09-11 PROCEDURE — 93306 TTE W/DOPPLER COMPLETE: CPT

## 2018-09-11 PROCEDURE — 93308 TTE F-UP OR LMTD: CPT | Mod: 26 | Performed by: INTERNAL MEDICINE

## 2018-09-12 ENCOUNTER — HOSPITAL ENCOUNTER (OUTPATIENT)
Dept: LAB | Facility: MEDICAL CENTER | Age: 58
End: 2018-09-12
Attending: PHYSICIAN ASSISTANT
Payer: MEDICARE

## 2018-09-12 ENCOUNTER — HOSPITAL ENCOUNTER (OUTPATIENT)
Dept: LAB | Facility: MEDICAL CENTER | Age: 58
End: 2018-09-12
Attending: INTERNAL MEDICINE
Payer: MEDICARE

## 2018-09-12 LAB
25(OH)D3 SERPL-MCNC: 53 NG/ML (ref 30–100)
ALBUMIN SERPL BCP-MCNC: 4 G/DL (ref 3.2–4.9)
ALBUMIN SERPL BCP-MCNC: 4.1 G/DL (ref 3.2–4.9)
ALBUMIN/GLOB SERPL: 2.1 G/DL
ALBUMIN/GLOB SERPL: 2.2 G/DL
ALP SERPL-CCNC: 23 U/L (ref 30–99)
ALP SERPL-CCNC: 24 U/L (ref 30–99)
ALT SERPL-CCNC: 15 U/L (ref 2–50)
ALT SERPL-CCNC: 15 U/L (ref 2–50)
ANION GAP SERPL CALC-SCNC: 7 MMOL/L (ref 0–11.9)
ANION GAP SERPL CALC-SCNC: 8 MMOL/L (ref 0–11.9)
AST SERPL-CCNC: 16 U/L (ref 12–45)
AST SERPL-CCNC: 16 U/L (ref 12–45)
BASOPHILS # BLD AUTO: 0.5 % (ref 0–1.8)
BASOPHILS # BLD AUTO: 0.5 % (ref 0–1.8)
BASOPHILS # BLD: 0.02 K/UL (ref 0–0.12)
BASOPHILS # BLD: 0.02 K/UL (ref 0–0.12)
BILIRUB CONJ SERPL-MCNC: <0.1 MG/DL (ref 0.1–0.5)
BILIRUB INDIRECT SERPL-MCNC: NORMAL MG/DL (ref 0–1)
BILIRUB SERPL-MCNC: 0.3 MG/DL (ref 0.1–1.5)
BILIRUB SERPL-MCNC: 0.4 MG/DL (ref 0.1–1.5)
BUN SERPL-MCNC: 13 MG/DL (ref 8–22)
BUN SERPL-MCNC: 13 MG/DL (ref 8–22)
CALCIUM SERPL-MCNC: 8.9 MG/DL (ref 8.5–10.5)
CALCIUM SERPL-MCNC: 9.2 MG/DL (ref 8.5–10.5)
CHLORIDE SERPL-SCNC: 106 MMOL/L (ref 96–112)
CHLORIDE SERPL-SCNC: 107 MMOL/L (ref 96–112)
CHOLEST SERPL-MCNC: 137 MG/DL (ref 100–199)
CO2 SERPL-SCNC: 29 MMOL/L (ref 20–33)
CO2 SERPL-SCNC: 29 MMOL/L (ref 20–33)
CREAT SERPL-MCNC: 0.95 MG/DL (ref 0.5–1.4)
CREAT SERPL-MCNC: 0.98 MG/DL (ref 0.5–1.4)
EOSINOPHIL # BLD AUTO: 0.08 K/UL (ref 0–0.51)
EOSINOPHIL # BLD AUTO: 0.1 K/UL (ref 0–0.51)
EOSINOPHIL NFR BLD: 1.9 % (ref 0–6.9)
EOSINOPHIL NFR BLD: 2.4 % (ref 0–6.9)
ERYTHROCYTE [DISTWIDTH] IN BLOOD BY AUTOMATED COUNT: 44.1 FL (ref 35.9–50)
ERYTHROCYTE [DISTWIDTH] IN BLOOD BY AUTOMATED COUNT: 44.1 FL (ref 35.9–50)
FERRITIN SERPL-MCNC: 44.1 NG/ML (ref 10–291)
FOLATE SERPL-MCNC: >24 NG/ML
GGT SERPL-CCNC: 6 U/L (ref 7–34)
GLOBULIN SER CALC-MCNC: 1.9 G/DL (ref 1.9–3.5)
GLOBULIN SER CALC-MCNC: 1.9 G/DL (ref 1.9–3.5)
GLUCOSE SERPL-MCNC: 140 MG/DL (ref 65–99)
GLUCOSE SERPL-MCNC: 142 MG/DL (ref 65–99)
HCT VFR BLD AUTO: 38.5 % (ref 37–47)
HCT VFR BLD AUTO: 38.6 % (ref 37–47)
HDLC SERPL-MCNC: 60 MG/DL
HGB BLD-MCNC: 12.6 G/DL (ref 12–16)
HGB BLD-MCNC: 12.6 G/DL (ref 12–16)
IMM GRANULOCYTES # BLD AUTO: 0.01 K/UL (ref 0–0.11)
IMM GRANULOCYTES # BLD AUTO: 0.02 K/UL (ref 0–0.11)
IMM GRANULOCYTES NFR BLD AUTO: 0.2 % (ref 0–0.9)
IMM GRANULOCYTES NFR BLD AUTO: 0.5 % (ref 0–0.9)
IRON SATN MFR SERPL: 24 % (ref 15–55)
IRON SERPL-MCNC: 72 UG/DL (ref 40–170)
LDLC SERPL CALC-MCNC: 48 MG/DL
LYMPHOCYTES # BLD AUTO: 0.9 K/UL (ref 1–4.8)
LYMPHOCYTES # BLD AUTO: 0.92 K/UL (ref 1–4.8)
LYMPHOCYTES NFR BLD: 21.5 % (ref 22–41)
LYMPHOCYTES NFR BLD: 22.2 % (ref 22–41)
MAGNESIUM SERPL-MCNC: 2.1 MG/DL (ref 1.5–2.5)
MAGNESIUM SERPL-MCNC: 2.1 MG/DL (ref 1.5–2.5)
MCH RBC QN AUTO: 29.4 PG (ref 27–33)
MCH RBC QN AUTO: 29.6 PG (ref 27–33)
MCHC RBC AUTO-ENTMCNC: 32.6 G/DL (ref 33.6–35)
MCHC RBC AUTO-ENTMCNC: 32.7 G/DL (ref 33.6–35)
MCV RBC AUTO: 90 FL (ref 81.4–97.8)
MCV RBC AUTO: 90.4 FL (ref 81.4–97.8)
MONOCYTES # BLD AUTO: 0.22 K/UL (ref 0–0.85)
MONOCYTES # BLD AUTO: 0.25 K/UL (ref 0–0.85)
MONOCYTES NFR BLD AUTO: 5.3 % (ref 0–13.4)
MONOCYTES NFR BLD AUTO: 6 % (ref 0–13.4)
NEUTROPHILS # BLD AUTO: 2.85 K/UL (ref 2–7.15)
NEUTROPHILS # BLD AUTO: 2.94 K/UL (ref 2–7.15)
NEUTROPHILS NFR BLD: 68.9 % (ref 44–72)
NEUTROPHILS NFR BLD: 70.1 % (ref 44–72)
NRBC # BLD AUTO: 0 K/UL
NRBC # BLD AUTO: 0 K/UL
NRBC BLD-RTO: 0 /100 WBC
NRBC BLD-RTO: 0 /100 WBC
PHOSPHATE SERPL-MCNC: 3 MG/DL (ref 2.5–4.5)
PLATELET # BLD AUTO: 76 K/UL (ref 164–446)
PLATELET # BLD AUTO: 80 K/UL (ref 164–446)
PMV BLD AUTO: 9.5 FL (ref 9–12.9)
PMV BLD AUTO: 9.9 FL (ref 9–12.9)
POTASSIUM SERPL-SCNC: 3.9 MMOL/L (ref 3.6–5.5)
POTASSIUM SERPL-SCNC: 3.9 MMOL/L (ref 3.6–5.5)
PROT SERPL-MCNC: 5.9 G/DL (ref 6–8.2)
PROT SERPL-MCNC: 6 G/DL (ref 6–8.2)
RBC # BLD AUTO: 4.26 M/UL (ref 4.2–5.4)
RBC # BLD AUTO: 4.29 M/UL (ref 4.2–5.4)
SODIUM SERPL-SCNC: 142 MMOL/L (ref 135–145)
SODIUM SERPL-SCNC: 144 MMOL/L (ref 135–145)
TIBC SERPL-MCNC: 304 UG/DL (ref 250–450)
TRANSFERRIN SERPL-MCNC: 218 MG/DL (ref 200–370)
TRIGL SERPL-MCNC: 145 MG/DL (ref 0–149)
VIT B12 SERPL-MCNC: 1344 PG/ML (ref 211–911)
WBC # BLD AUTO: 4.1 K/UL (ref 4.8–10.8)
WBC # BLD AUTO: 4.2 K/UL (ref 4.8–10.8)

## 2018-09-12 PROCEDURE — 83550 IRON BINDING TEST: CPT

## 2018-09-12 PROCEDURE — 82977 ASSAY OF GGT: CPT

## 2018-09-12 PROCEDURE — 80061 LIPID PANEL: CPT

## 2018-09-12 PROCEDURE — 84100 ASSAY OF PHOSPHORUS: CPT

## 2018-09-12 PROCEDURE — 83540 ASSAY OF IRON: CPT

## 2018-09-12 PROCEDURE — 82248 BILIRUBIN DIRECT: CPT

## 2018-09-12 PROCEDURE — 80053 COMPREHEN METABOLIC PANEL: CPT

## 2018-09-12 PROCEDURE — 85025 COMPLETE CBC W/AUTO DIFF WBC: CPT

## 2018-09-12 PROCEDURE — 83735 ASSAY OF MAGNESIUM: CPT | Mod: 91

## 2018-09-12 PROCEDURE — 83036 HEMOGLOBIN GLYCOSYLATED A1C: CPT

## 2018-09-12 PROCEDURE — 82607 VITAMIN B-12: CPT

## 2018-09-12 PROCEDURE — 82746 ASSAY OF FOLIC ACID SERUM: CPT

## 2018-09-12 PROCEDURE — 84466 ASSAY OF TRANSFERRIN: CPT

## 2018-09-12 PROCEDURE — 36415 COLL VENOUS BLD VENIPUNCTURE: CPT

## 2018-09-12 PROCEDURE — 82306 VITAMIN D 25 HYDROXY: CPT

## 2018-09-12 PROCEDURE — 83735 ASSAY OF MAGNESIUM: CPT

## 2018-09-12 PROCEDURE — 80197 ASSAY OF TACROLIMUS: CPT

## 2018-09-12 PROCEDURE — 85025 COMPLETE CBC W/AUTO DIFF WBC: CPT | Mod: 91

## 2018-09-12 PROCEDURE — 80053 COMPREHEN METABOLIC PANEL: CPT | Mod: 91

## 2018-09-12 PROCEDURE — 84425 ASSAY OF VITAMIN B-1: CPT

## 2018-09-12 PROCEDURE — 82525 ASSAY OF COPPER: CPT

## 2018-09-12 PROCEDURE — 82728 ASSAY OF FERRITIN: CPT

## 2018-09-13 LAB
EST. AVERAGE GLUCOSE BLD GHB EST-MCNC: 120 MG/DL
HBA1C MFR BLD: 5.8 % (ref 0–5.6)

## 2018-09-14 ENCOUNTER — TELEPHONE (OUTPATIENT)
Dept: PULMONOLOGY | Facility: HOSPICE | Age: 58
End: 2018-09-14

## 2018-09-14 ENCOUNTER — OFFICE VISIT (OUTPATIENT)
Dept: PULMONOLOGY | Facility: HOSPICE | Age: 58
End: 2018-09-14
Payer: MEDICARE

## 2018-09-14 VITALS
WEIGHT: 144 LBS | OXYGEN SATURATION: 98 % | TEMPERATURE: 98.4 F | HEART RATE: 58 BPM | HEIGHT: 67 IN | SYSTOLIC BLOOD PRESSURE: 116 MMHG | BODY MASS INDEX: 22.6 KG/M2 | DIASTOLIC BLOOD PRESSURE: 70 MMHG | RESPIRATION RATE: 16 BRPM

## 2018-09-14 DIAGNOSIS — K76.81 HEPATOPULMONARY SYNDROME (HCC): ICD-10-CM

## 2018-09-14 DIAGNOSIS — Z94.4 STATUS POST LIVER TRANSPLANT (HCC): ICD-10-CM

## 2018-09-14 DIAGNOSIS — J69.0 ASPIRATION PNEUMONIA OF BOTH LOWER LOBES DUE TO GASTRIC SECRETIONS (HCC): ICD-10-CM

## 2018-09-14 DIAGNOSIS — D84.9 IMMUNOCOMPROMISED STATE (HCC): ICD-10-CM

## 2018-09-14 DIAGNOSIS — G47.33 OBSTRUCTIVE SLEEP APNEA: ICD-10-CM

## 2018-09-14 DIAGNOSIS — R09.02 HYPOXEMIA: ICD-10-CM

## 2018-09-14 DIAGNOSIS — Q21.11 OSTIUM SECUNDUM TYPE ATRIAL SEPTAL DEFECT: ICD-10-CM

## 2018-09-14 DIAGNOSIS — I27.20 PULMONARY HYPERTENSION (HCC): ICD-10-CM

## 2018-09-14 DIAGNOSIS — Z98.84 HISTORY OF ROUX-EN-Y GASTRIC BYPASS: ICD-10-CM

## 2018-09-14 DIAGNOSIS — D86.9 SARCOIDOSIS: ICD-10-CM

## 2018-09-14 LAB
COPPER SERPL-MCNC: 85 UG/DL (ref 80–155)
TACROLIMUS BLD-MCNC: 2.5 NG/ML

## 2018-09-14 PROCEDURE — 99214 OFFICE O/P EST MOD 30 MIN: CPT | Performed by: INTERNAL MEDICINE

## 2018-09-14 RX ORDER — OXYCODONE AND ACETAMINOPHEN TABLETS 10; 300 MG/1; MG/1
10 TABLET ORAL
COMMUNITY
End: 2018-09-28

## 2018-09-14 RX ORDER — TACROLIMUS 0.5 MG/1
0.5 CAPSULE ORAL
COMMUNITY
Start: 2018-06-13 | End: 2018-09-28

## 2018-09-14 RX ORDER — MYCOPHENOLATE MOFETIL 250 MG/1
500 CAPSULE ORAL
COMMUNITY
Start: 2018-08-29 | End: 2018-09-28

## 2018-09-14 RX ORDER — MORPHINE SULFATE 20 MG/5ML
SOLUTION ORAL
Refills: 0 | COMMUNITY
Start: 2018-08-22 | End: 2018-09-28

## 2018-09-14 RX ORDER — BLOOD-GLUCOSE METER
KIT MISCELLANEOUS
Refills: 3 | COMMUNITY
Start: 2018-08-13 | End: 2018-10-28

## 2018-09-14 RX ORDER — BUDESONIDE AND FORMOTEROL FUMARATE DIHYDRATE 80; 4.5 UG/1; UG/1
2 AEROSOL RESPIRATORY (INHALATION)
COMMUNITY
Start: 2015-08-27 | End: 2018-09-28

## 2018-09-14 RX ORDER — PREDNISONE 10 MG/1
TABLET ORAL
Refills: 0 | COMMUNITY
Start: 2018-09-06 | End: 2018-09-28

## 2018-09-14 RX ORDER — TIOTROPIUM BROMIDE 18 UG/1
18 CAPSULE ORAL; RESPIRATORY (INHALATION) DAILY
Qty: 2 CAP | Refills: 0 | Status: SHIPPED | OUTPATIENT
Start: 2018-09-14 | End: 2018-12-04

## 2018-09-14 RX ORDER — POLYETHYLENE GLYCOL 3350 17 G/17G
POWDER, FOR SOLUTION ORAL
COMMUNITY
End: 2018-09-28

## 2018-09-14 RX ORDER — MELATONIN
1
COMMUNITY
Start: 2016-10-06 | End: 2018-09-28

## 2018-09-14 RX ORDER — TRIAMCINOLONE ACETONIDE 0.25 MG/G
1 CREAM TOPICAL
COMMUNITY
Start: 2015-08-27 | End: 2018-09-28

## 2018-09-14 NOTE — TELEPHONE ENCOUNTER
[facility] to [facility] for spiriva Rx for samples was received. Confirmed by Donna, dispense 30 day supply.

## 2018-09-14 NOTE — PROGRESS NOTES
Chief Complaint   Patient presents with   • Follow-Up     Oxygen dependent       HPI:  Please see my prior notes concerning this very complex patient.  She was once again hospitalized with pneumonia in January. There was a suspicion of microaspiration and she had been placed on Prilosec which seemed to be improving her respiratory problems.  Remarkably she had had a CT scan ordered on January 19 to follow up previous infiltrates. Her outpatient scan on January 19 showed no evidence of significant pulmonary infiltrates. On January 20 the patient may have had another aspiration episode at night. She came to the emergency room with bilateral lower lobe pulmonary infiltrates and was admitted with a diagnosis of pneumonia and sepsis.  She has a history of a gastric sleeve. Because of her recurrent aspiration she had repeat surgery at [facility] by Dr. Jimenez on 3/19/18. She had a laparoscopic Evon-en-Y gastric bypass that was a conversion from her previous sleeve gastrectomy.   Since March she has lost over 30 pounds after the Evon and Y procedure.  She was hospitalized for relative hypotension necessitating reduction in some of her pulmonary hypertension medication.  Tadalafil was reduced to 20 mg per day and Lasix was reduced to 20 mg per day.  She continues on ambrisentan at 10 mg per day.  We reviewed her medications.  From a pulmonary perspective she remains on supplemental oxygen.  She has not had any further aspiration events after her surgery.  She was able to travel to [facility] with her .    Past Medical History:   Diagnosis Date   • Breath shortness    • Bronchitis      2016   • Cardiomegaly    • Chronic fatigue and malaise    • Cirrhosis (HCC) December 2011    [place] [facility] [place]   • CKD (chronic kidney disease) stage 3, GFR 30-59 ml/min    • Diabetes (HCC)     reports hx of, resolved w/weight loss. Reports still checking bloodsugars twice daily and using insulin PRN   • Fracture of left foot   "  • GERD (gastroesophageal reflux disease)         • H/O Clostridium difficile infection 02-17-17    reports \"16 months ago\". Current stool sample 01-26-17 neg   • Hypothyroid    • Low back pain 02-17-17    and left foot, 7/10   • Mild aortic regurgitation and aortic valve sclerosis     • On home oxygen therapy     4 liters, Dr. Gaming   • Platelet disorder ([facility])     low platelets   • Pneumonia     aspiration,    • Psychiatric disorder     Mood disorder   • Pulmonary hypertension ([facility])        • S/P cholecystectomy    • Small bowel obstruction ([facility])     01-   • Splenomegaly    • VRE (vancomycin-resistant Enterococci)     02-17-17, pt declines knowledge of this       ROS:   Constitutional: Denies fevers, chills, night sweats, fatigue   Eyes: Denies vision loss, pain, drainage, double vision  Ears, Nose, Throat: Denies earache, tinnitus, hoarseness  Cardiovascular: Denies chest pain, tightness, palpitations  Respiratory: Denies  sputum production, cough, hemoptysis  Sleep: She has obstructive sleep apnea  GI: Denies abdominal pain, nausea, vomiting.  Now has some degree of chronic diarrhea.  She requires small frequent meals.  : Denies frequent urination, hematuria, painful urination  Musculoskeletal: Denies back pain, painful joints, sore muscles  Neurological: Denies headaches, seizures  Skin: Denies rashes, color changes  Psychiatric: Denies depression or thoughts of suicide  Hematologic: Denies bleeding tendency or clotting tendency  Allergic/Immunologic: Denies rhinitis, skin sensitivity    Social History     Social History   • Marital status:      Spouse name: N/A   • Number of children: N/A   • Years of education: N/A     Occupational History   • Not on file.     Social History Main Topics   • Smoking status: Never Smoker   • Smokeless tobacco: Never Used      Comment: avoid all tobacco products   • Alcohol use No      Comment: 05/2009 quit drinking   • Drug use: No   • Sexual " activity: Not on file     Other Topics Concern   • Not on file     Social History Narrative   • No narrative on file     Rhubarb; Organic nitrates; and Vancomycin hcl  Current Outpatient Prescriptions on File Prior to Visit   Medication Sig Dispense Refill   • midodrine (PROAMATINE) 5 MG Tab Take 1 Tab by mouth 3 times a day. 270 Tab 3   • traZODone (DESYREL) 50 MG Tab Take 2 Tabs by mouth every bedtime. 180 Tab 1   • ALPRAZolam (XANAX) 1 MG Tab Take 1 Tab by mouth 3 times a day for 90 days. 90 Tab 2   • pravastatin (PRAVACHOL) 20 MG Tab Take 1 Tab by mouth every day. 90 Tab 2   • methylnaltrexone (RELISTOR) 12 MG/0.6ML Solution Inject 0.6 mL as instructed every day. 84 Vial 3   • Tadalafil, PAH, (ADCIRCA) 20 MG Tab Take 40 mg by mouth every bedtime. (Patient taking differently: Take 20 mg by mouth every bedtime.) 180 Tab 3   • cyclobenzaprine (FLEXERIL) 10 MG Tab Take 10 mg by mouth 3 times a day as needed.     • fluticasone (FLONASE) 50 MCG/ACT nasal spray Spray 1 Spray in nose every day.     • ferrous sulfate (FEOSOL) 220 (44 Fe) MG/5ML Elixir Take 5 mL by mouth every day. 1 Bottle 10   • gabapentin (NEURONTIN) 600 MG tablet TAKE 1 TABLET BY MOUTH 3 TIMES A DAY. 90 Tab 2   • bisacodyl (DULCOLAX) 10 MG Suppos Insert 1 Suppository in rectum 1 time daily as needed (constipation). 30 Suppository 3   • docusate sodium (COLACE) 100 MG Cap Take 100 mg by mouth 2 times a day.     • predniSONE (DELTASONE) 1 MG Tab Take 7 mg by mouth every day. Pt uses a 5 mg and 2- 1 mg tabs to equal a daily dose of 7 mg     • Calcium Citrate-Vitamin D 315-250 MG-UNIT Tab Take 1 Tab by mouth 2 Times a Day.     • Wheat Dextrin (BENEFIBER) Powder Take 1 Dose by mouth every day.     • SPIRIVA HANDIHALER 18 MCG Cap INHALE 1 CAP BY MOUTH DAILY. 30 Cap 6   • omeprazole (PRILOSEC) 40 MG delayed-release capsule Take 1 Cap by mouth every day. 90 Cap 0   • aspirin 81 MG tablet Take 1 Tab by mouth every day. 90 Tab 0   • furosemide (LASIX) 40 MG Tab  "TAKE 1 TAB BY MOUTH EVERY DAY. 30 Tab 6   • Linaclotide (LINZESS) 290 MCG Cap Take 1 Cap by mouth every day. 90 Cap 3   • sertraline (ZOLOFT) 100 MG Tab Take 1 Tab by mouth every bedtime. 90 Tab 4   • levothyroxine (SYNTHROID) 137 MCG Tab Take 1 Tab by mouth Every morning on an empty stomach. 90 Tab 3   • ambrisentan (LETAIRIS) 10 MG tablet Take 1 Tab by mouth every day. 30 Tab 11   • ascorbic acid (ASCORBIC ACID) 500 MG Tab Take 500 mg by mouth every day.     • ondansetron (ZOFRAN ODT) 4 MG TABLET DISPERSIBLE Take 1 Tab by mouth every 8 hours as needed for Nausea/Vomiting. Nausea and vomiting 270 Tab 4   • mycophenolate (CELLCEPT) 250 MG Cap Take 500 mg by mouth 2 times a day.     • DULoxetine (CYMBALTA) 20 MG Cap DR Particles Take 20 mg by mouth every day.  1   • furosemide (LASIX) 20 MG Tab Take 20 mg by mouth 2 times a day.     • lactulose 10 GM/15ML Solution Take 30 mL by mouth 2 times a day. 1800 mL 3   • Calcium-Magnesium-Vitamin D (CITRACAL CALCIUM+D PO) Take  by mouth.     • morphine 10 MG/5ML Solution Take 15-20 mg by mouth every 6 hours. Scheduled     • Oxymetazoline HCl (AFRIN NASAL SPRAY NA) Spray 1-2 Sprays in nose every bedtime.     • polyethylene glycol 3350 (MIRALAX) Powder Take 17 g by mouth every day.     • Carboxymethylcell-Hypromellose (GENTEAL) 0.25-0.3 % Gel Place 0.3 mg in both eyes every bedtime.     • NON SPECIFIED Take 1 Cap by mouth every evening. Bariatric Dietary Supplment      • tacrolimus (PROGRAF) 0.5 MG Cap Take 1.5 mg by mouth 2 Times a Day. Uses a 1 mg and a 0.5 mg tab to equal a       No current facility-administered medications on file prior to visit.      Blood pressure 116/70, pulse (!) 58, temperature 36.9 °C (98.4 °F), resp. rate 16, height 1.702 m (5' 7\"), weight 65.3 kg (144 lb), SpO2 98 %.  Family History   Problem Relation Age of Onset   • Other Father         Unknown (dead 10 years)   • Diabetes Father    • Heart Disease Father    • Hypertension Father    • " Hyperlipidemia Father    • Stroke Father    • Non-contributory Mother    • Hyperlipidemia Mother    • Alcohol/Drug Mother    • Cancer Paternal Aunt    • Alcohol/Drug Maternal Grandmother    • Alcohol/Drug Maternal Grandfather        Physical Exam:  No distress at rest on supplemental oxygen  HEENT: PERRLA, EOMI, no scleral icterus, no nasal or oral lesions  Neck: No thyromegaly, no adenopathy, no bruits  Mallampatti: Grade II  Lungs: Equal breath sounds, no wheezes or crackles  Heart: Regular rate and rhythm, no gallops or murmurs  Abdomen: Soft, benign, no organomegaly  Extremities: No clubbing, cyanosis, or edema  Neurologic: Cranial nerve, motor, and sensory exam are normal    1. Hypoxemia    2. Pulmonary hypertension (HCC)    3. Status post liver transplant Dr. Canada ([facility])    4. Sarcoidosis (HCC)    5. Hepatopulmonary syndrome (HCC)    6. Immunocompromised state (HCC)    7. Obstructive sleep apnea    8. Ostium secundum type atrial septal defect, S/P percutaneous closure    9. Aspiration pneumonia of both lower lobes due to gastric secretions (HCC)    10. History of Evon-en-Y gastric bypass        Continue her current medications and supplemental oxygen.  Tadalafil and ambrisentan will be can continued.  After her surgery she has had excellent control of her previous reflux and aspiration.  She has been able to reduce some of her medications as she has lost weight.  We will see her back in 6 months or sooner if there are changes.  We did provide her with a prescription for Spiriva samples.

## 2018-09-18 ENCOUNTER — TELEPHONE (OUTPATIENT)
Dept: PULMONOLOGY | Facility: HOSPICE | Age: 58
End: 2018-09-18

## 2018-09-18 LAB — VIT B1 BLD-MCNC: 143 NMOL/L (ref 70–180)

## 2018-09-20 DIAGNOSIS — K59.03 DRUG-INDUCED CONSTIPATION: ICD-10-CM

## 2018-09-21 RX ORDER — IBUPROFEN 100 MG/5ML
10 SUSPENSION, ORAL (FINAL DOSE FORM) ORAL
Qty: 30 SUPPOSITORY | Refills: 3 | Status: SHIPPED | OUTPATIENT
Start: 2018-09-21 | End: 2018-10-28

## 2018-09-28 ENCOUNTER — APPOINTMENT (OUTPATIENT)
Dept: ADMISSIONS | Facility: MEDICAL CENTER | Age: 58
End: 2018-09-28
Attending: SPECIALIST
Payer: MEDICARE

## 2018-09-28 DIAGNOSIS — Z01.810 PRE-OPERATIVE CARDIOVASCULAR EXAMINATION: ICD-10-CM

## 2018-09-28 DIAGNOSIS — Z01.818 OTHER SPECIFIED PRE-OPERATIVE EXAMINATION: ICD-10-CM

## 2018-09-28 LAB
BASOPHILS # BLD AUTO: 0.4 % (ref 0–1.8)
BASOPHILS # BLD: 0.02 K/UL (ref 0–0.12)
EKG IMPRESSION: NORMAL
EOSINOPHIL # BLD AUTO: 0.05 K/UL (ref 0–0.51)
EOSINOPHIL NFR BLD: 1.1 % (ref 0–6.9)
ERYTHROCYTE [DISTWIDTH] IN BLOOD BY AUTOMATED COUNT: 44.8 FL (ref 35.9–50)
HCT VFR BLD AUTO: 36.5 % (ref 37–47)
HGB BLD-MCNC: 12 G/DL (ref 12–16)
IMM GRANULOCYTES # BLD AUTO: 0.02 K/UL (ref 0–0.11)
IMM GRANULOCYTES NFR BLD AUTO: 0.4 % (ref 0–0.9)
LYMPHOCYTES # BLD AUTO: 0.78 K/UL (ref 1–4.8)
LYMPHOCYTES NFR BLD: 17.2 % (ref 22–41)
MCH RBC QN AUTO: 29.5 PG (ref 27–33)
MCHC RBC AUTO-ENTMCNC: 32.9 G/DL (ref 33.6–35)
MCV RBC AUTO: 89.7 FL (ref 81.4–97.8)
MONOCYTES # BLD AUTO: 0.19 K/UL (ref 0–0.85)
MONOCYTES NFR BLD AUTO: 4.2 % (ref 0–13.4)
NEUTROPHILS # BLD AUTO: 3.48 K/UL (ref 2–7.15)
NEUTROPHILS NFR BLD: 76.7 % (ref 44–72)
NRBC # BLD AUTO: 0 K/UL
NRBC BLD-RTO: 0 /100 WBC
PLATELET # BLD AUTO: 115 K/UL (ref 164–446)
PMV BLD AUTO: 9.7 FL (ref 9–12.9)
RBC # BLD AUTO: 4.07 M/UL (ref 4.2–5.4)
WBC # BLD AUTO: 4.5 K/UL (ref 4.8–10.8)

## 2018-09-28 PROCEDURE — 36415 COLL VENOUS BLD VENIPUNCTURE: CPT

## 2018-09-28 PROCEDURE — 85025 COMPLETE CBC W/AUTO DIFF WBC: CPT

## 2018-09-28 RX ORDER — TADALAFIL 20 MG/1
20 TABLET ORAL
Status: ON HOLD | COMMUNITY
End: 2018-11-20 | Stop reason: SDUPTHER

## 2018-09-28 RX ORDER — OXYCODONE HYDROCHLORIDE 10 MG/1
10 TABLET ORAL 3 TIMES DAILY
Status: ON HOLD | COMMUNITY
End: 2018-11-13

## 2018-10-04 ENCOUNTER — PATIENT OUTREACH (OUTPATIENT)
Dept: HEALTH INFORMATION MANAGEMENT | Facility: OTHER | Age: 58
End: 2018-10-04

## 2018-10-04 ENCOUNTER — HOSPITAL ENCOUNTER (OUTPATIENT)
Facility: MEDICAL CENTER | Age: 58
End: 2018-10-04
Attending: SPECIALIST | Admitting: SPECIALIST
Payer: MEDICARE

## 2018-10-04 VITALS
SYSTOLIC BLOOD PRESSURE: 110 MMHG | TEMPERATURE: 98.5 F | RESPIRATION RATE: 16 BRPM | BODY MASS INDEX: 21.22 KG/M2 | HEIGHT: 68 IN | HEART RATE: 67 BPM | OXYGEN SATURATION: 97 % | DIASTOLIC BLOOD PRESSURE: 57 MMHG | WEIGHT: 139.99 LBS

## 2018-10-04 PROBLEM — M95.0 NASAL VALVE COLLAPSE: Status: ACTIVE | Noted: 2018-10-04

## 2018-10-04 PROCEDURE — 160009 HCHG ANES TIME/MIN: Performed by: SPECIALIST

## 2018-10-04 PROCEDURE — 700101 HCHG RX REV CODE 250

## 2018-10-04 PROCEDURE — 501838 HCHG SUTURE GENERAL: Performed by: SPECIALIST

## 2018-10-04 PROCEDURE — 700111 HCHG RX REV CODE 636 W/ 250 OVERRIDE (IP)

## 2018-10-04 PROCEDURE — 502573 HCHG PACK, ENT: Performed by: SPECIALIST

## 2018-10-04 PROCEDURE — A9270 NON-COVERED ITEM OR SERVICE: HCPCS

## 2018-10-04 PROCEDURE — 160036 HCHG PACU - EA ADDL 30 MINS PHASE I: Performed by: SPECIALIST

## 2018-10-04 PROCEDURE — 700102 HCHG RX REV CODE 250 W/ 637 OVERRIDE(OP)

## 2018-10-04 PROCEDURE — 160048 HCHG OR STATISTICAL LEVEL 1-5: Performed by: SPECIALIST

## 2018-10-04 PROCEDURE — 160035 HCHG PACU - 1ST 60 MINS PHASE I: Performed by: SPECIALIST

## 2018-10-04 PROCEDURE — 160041 HCHG SURGERY MINUTES - EA ADDL 1 MIN LEVEL 4: Performed by: SPECIALIST

## 2018-10-04 PROCEDURE — 500331 HCHG COTTONOID, SURG PATTIE: Performed by: SPECIALIST

## 2018-10-04 PROCEDURE — 502000 HCHG MISC OR IMPLANTS RC 0278: Performed by: SPECIALIST

## 2018-10-04 PROCEDURE — 160002 HCHG RECOVERY MINUTES (STAT): Performed by: SPECIALIST

## 2018-10-04 PROCEDURE — 160029 HCHG SURGERY MINUTES - 1ST 30 MINS LEVEL 4: Performed by: SPECIALIST

## 2018-10-04 RX ORDER — METHYLPREDNISOLONE ACETATE 80 MG/ML
INJECTION, SUSPENSION INTRA-ARTICULAR; INTRALESIONAL; INTRAMUSCULAR; SOFT TISSUE
Status: DISCONTINUED
Start: 2018-10-04 | End: 2018-10-04 | Stop reason: HOSPADM

## 2018-10-04 RX ORDER — HYDROMORPHONE HYDROCHLORIDE 2 MG/ML
0.4 INJECTION, SOLUTION INTRAMUSCULAR; INTRAVENOUS; SUBCUTANEOUS
Status: DISCONTINUED | OUTPATIENT
Start: 2018-10-04 | End: 2018-10-04 | Stop reason: HOSPADM

## 2018-10-04 RX ORDER — HYDROMORPHONE HYDROCHLORIDE 2 MG/ML
0.2 INJECTION, SOLUTION INTRAMUSCULAR; INTRAVENOUS; SUBCUTANEOUS
Status: DISCONTINUED | OUTPATIENT
Start: 2018-10-04 | End: 2018-10-04 | Stop reason: HOSPADM

## 2018-10-04 RX ORDER — OXYCODONE HCL 5 MG/5 ML
SOLUTION, ORAL ORAL
Status: DISCONTINUED
Start: 2018-10-04 | End: 2018-10-04 | Stop reason: HOSPADM

## 2018-10-04 RX ORDER — OXYCODONE HCL 5 MG/5 ML
10 SOLUTION, ORAL ORAL
Status: COMPLETED | OUTPATIENT
Start: 2018-10-04 | End: 2018-10-04

## 2018-10-04 RX ORDER — BACITRACIN ZINC 500 [USP'U]/G
OINTMENT TOPICAL
Status: DISCONTINUED | OUTPATIENT
Start: 2018-10-04 | End: 2018-10-04 | Stop reason: HOSPADM

## 2018-10-04 RX ORDER — HALOPERIDOL 5 MG/ML
1 INJECTION INTRAMUSCULAR
Status: DISCONTINUED | OUTPATIENT
Start: 2018-10-04 | End: 2018-10-04 | Stop reason: HOSPADM

## 2018-10-04 RX ORDER — LIDOCAINE HYDROCHLORIDE AND EPINEPHRINE 10; 10 MG/ML; UG/ML
INJECTION, SOLUTION INFILTRATION; PERINEURAL
Status: DISCONTINUED | OUTPATIENT
Start: 2018-10-04 | End: 2018-10-04 | Stop reason: HOSPADM

## 2018-10-04 RX ORDER — HYDROMORPHONE HYDROCHLORIDE 2 MG/ML
0.1 INJECTION, SOLUTION INTRAMUSCULAR; INTRAVENOUS; SUBCUTANEOUS
Status: DISCONTINUED | OUTPATIENT
Start: 2018-10-04 | End: 2018-10-04 | Stop reason: HOSPADM

## 2018-10-04 RX ORDER — SODIUM CHLORIDE 9 MG/ML
INJECTION, SOLUTION INTRAVENOUS CONTINUOUS
Status: DISCONTINUED | OUTPATIENT
Start: 2018-10-04 | End: 2018-10-04 | Stop reason: HOSPADM

## 2018-10-04 RX ORDER — OXYCODONE HYDROCHLORIDE 5 MG/1
5 TABLET ORAL
Status: DISCONTINUED | OUTPATIENT
Start: 2018-10-04 | End: 2018-10-04 | Stop reason: HOSPADM

## 2018-10-04 RX ORDER — ONDANSETRON 2 MG/ML
4 INJECTION INTRAMUSCULAR; INTRAVENOUS
Status: DISCONTINUED | OUTPATIENT
Start: 2018-10-04 | End: 2018-10-04 | Stop reason: HOSPADM

## 2018-10-04 RX ORDER — BACITRACIN ZINC 500 [USP'U]/G
OINTMENT TOPICAL
Status: DISCONTINUED
Start: 2018-10-04 | End: 2018-10-04 | Stop reason: HOSPADM

## 2018-10-04 RX ORDER — LIDOCAINE HYDROCHLORIDE AND EPINEPHRINE 10; 10 MG/ML; UG/ML
INJECTION, SOLUTION INFILTRATION; PERINEURAL
Status: DISCONTINUED
Start: 2018-10-04 | End: 2018-10-04 | Stop reason: HOSPADM

## 2018-10-04 RX ORDER — NEOMYCIN SULFATE, POLYMYXIN B SULFATE AND DEXAMETHASONE 3.5; 10000; 1 MG/ML; [USP'U]/ML; MG/ML
SUSPENSION/ DROPS OPHTHALMIC
Status: DISCONTINUED
Start: 2018-10-04 | End: 2018-10-04 | Stop reason: HOSPADM

## 2018-10-04 RX ORDER — SODIUM CHLORIDE 9 MG/ML
INJECTION, SOLUTION INTRAVENOUS CONTINUOUS
Status: DISCONTINUED | OUTPATIENT
Start: 2018-10-04 | End: 2018-10-04

## 2018-10-04 RX ORDER — OXYCODONE HYDROCHLORIDE 5 MG/1
10 TABLET ORAL
Status: DISCONTINUED | OUTPATIENT
Start: 2018-10-04 | End: 2018-10-04 | Stop reason: HOSPADM

## 2018-10-04 RX ORDER — OXYCODONE HCL 5 MG/5 ML
5 SOLUTION, ORAL ORAL
Status: COMPLETED | OUTPATIENT
Start: 2018-10-04 | End: 2018-10-04

## 2018-10-04 RX ADMIN — Medication 10 MG: at 09:39

## 2018-10-04 RX ADMIN — SODIUM CHLORIDE: 9 INJECTION, SOLUTION INTRAVENOUS at 07:43

## 2018-10-04 ASSESSMENT — PAIN SCALES - GENERAL
PAINLEVEL_OUTOF10: 5
PAINLEVEL_OUTOF10: 5
PAINLEVEL_OUTOF10: 0
PAINLEVEL_OUTOF10: 5
PAINLEVEL_OUTOF10: 7

## 2018-10-04 NOTE — OR NURSING
0922  RECEIVED PATIENT FROM OR.  REPORT FROM DR. PHAM.  NO AIRWAY IN PLACE.  RESPIRATIONS ARE EVEN AND UNLABORED.  NO BLEEDING FROM NOSE.    0938  MEDICATED WITH IV FENTANYL FOR C/O NOSE PAIN 7.    0939  MEDICATED WITH PO OXYCODONE.      0950   WILLIAM TO BEDSIDE.    0955  MEDICATED WITH IV FENTANYL FOR C/O NOSE PAIN 5.    1024  UP GETTING DRESSED.    1033  DISCHARGED.  DISCHARGE INSTRUCTIONS GIVEN TO PATIENT AND HER .  A VERBAL UNDERSTANDING OF ALL INSTRUCTIONS WAS STATED.  DRIP PAD AND NASAL SLING IN PLACE.  PATIENT STATES SHE IS READY TO GO HOME.  PATIENT DISCHARGED ON HER OWN OXYGEN TANK.

## 2018-10-04 NOTE — OP REPORT
DATE OF SERVICE:  10/04/2018    SURGEON:  Silviano Erazo MD    ASSISTANT:  None.    ANESTHESIA:  General given under endotracheal tube by Dr. Garner.    PREOPERATIVE DIAGNOSES:  Nasal valve collapse and turbinate hypertrophy.    POSTOPERATIVE DIAGNOSES:  Nasal valve collapse and turbinate hypertrophy.    NAME OF THE PROCEDURE:  Repair of nasal vestibular stenosis with lateral wall   implants (Latera) bilateral; intramural cauterization of inferior turbinates,   bilateral.    INDICATIONS:  Patient is a 58-year-old woman with multiple medical problems   who has difficulty with nasal valvular collapse and nasal obstruction.  She   has had previous nasal septoplasty and turbinate reduction.  She requires   oxygen per nasal cannula on a 24/7 basis.  She has difficulty with the nasal   obstruction exacerbating this problem.    DESCRIPTION OF PROCEDURE:  Patient was brought in the operating room and   placed in supine position on operating table.  General anesthesia was induced   and the patient was endotracheally intubated without difficulty.  Eyes   protected with taping laterally and the tables turned 90 degrees.    Vibrissae were trimmed from the nasal vestibule areas bilaterally.    Approximately, 0.75 to 1 mL of 1% Xylocaine with 1:100,000 units of   epinephrine solution was used to infiltrate along the lateral wall of the nose   bilaterally in anticipation of implants for reconstruction.    Attention was first directed to the inferior turbinate areas.  The anterior   aspect of the inferior turbinates has been previously surgically reduced.  She   has mulberry change present along the posterior aspect of the inferior   turbinates.  An approximate 1.5 mL of 1% Xylocaine with epinephrine was used   to infiltrate into the soft tissue, the posterior aspect of the inferior   turbinate on the left side.  A Coblation device with a reflex ultra wand was   introduced into the soft tissue of the inferior turbinates along its    inferomedial aspect, followed by coagulation using the Coblator was   undertaken.  A similar procedure was then performed along the right inferior   turbinate.    At this point, the patient's mid face and nasal vestibule areas were prepped   and the patient was draped to expose the mid face for surgery.  The Latera   implant system was utilized and template was placed on the lateral wall of the   nose to allow for proper positioning of the implant.  Once the area has been   marked, the implant application devices introduced through the nasal   vestibular skin superficial to the lower lateral cartilage and passed along   the upper lateral cartilage to the nasal bone area.  The Latera implant was   then deployed and was noted to have a satisfactory position following   deployment.  Improved strength of the lateral wall of the nose was noted.    Attention was then directed to the right side.  In a similar fashion, the   Latera application device was introduced via the nasal vestibular skin lateral   to the lower lateral cartilage and upper lateral cartilage and extended into   the soft tissue along the nasal bone area.  Deployment of the implant was   undertaken and again there was noted to be improved stability of the lateral   wall of the nose.    The procedure was then terminated.  The patient subsequently awakened from   anesthesia and extubated without difficulty.  She was taken from the operating   room to the recovery area, awake, and breathing on her own in stable   condition.  There are no apparent complications.  Blood loss is negligible.       ____________________________________     MD JOSY SAL / SAVAGE    DD:  10/04/2018 09:28:25  DT:  10/04/2018 09:49:40    D#:  [id number]  Job#:  [id number]

## 2018-10-04 NOTE — OR SURGEON
Immediate Post OP Note    PreOp Diagnosis: nasal vavle collapse, turbinate hypertrophy  PostOp Diagnosis: same    Procedure(s):  TURBINATE REDUCTION- RADIOFREQUENCY bilateral- Wound Class: Clean Contaminated  Repair nasal vestibular stenosis- LATERA IMPLANTS - Wound Class: Clean Contaminated    Surgeon(s):  Silviano Erazo M.D.    Anesthesiologist/Type of Anesthesia:  Anesthesiologist: Marcelo Garner M.D./General    Surgical Staff:  Circulator: Chiquita Fournier R.N.  Scrub Person: Ioana Baugh    Specimens removed if any:  * No specimens in log *    Estimated Blood Loss: <10ml    Findings: bilateral Latera    Complications: none        10/4/2018 9:22 AM Silviano Erazo M.D.

## 2018-10-04 NOTE — DISCHARGE INSTRUCTIONS
ACTIVITY: Rest and take it easy for the first 24 hours.  A responsible adult is recommended to remain with you during that time.  It is normal to feel sleepy.  We encourage you to not do anything that requires balance, judgment or coordination.    MILD FLU-LIKE SYMPTOMS ARE NORMAL. YOU MAY EXPERIENCE GENERALIZED MUSCLE ACHES, THROAT IRRITATION, HEADACHE AND/OR SOME NAUSEA.    FOR 24 HOURS DO NOT:  Drive, operate machinery or run household appliances.  Drink beer or alcoholic beverages.   Make important decisions or sign legal documents.    SPECIAL INSTRUCTIONS: **SEE NASAL INSTRUCTION SHEET*    DIET: To avoid nausea, slowly advance diet as tolerated, avoiding spicy or greasy foods for the first day.  Add more substantial food to your diet according to your physician's instructions.  Babies can be fed formula or breast milk as soon as they are hungry.  INCREASE FLUIDS AND FIBER TO AVOID CONSTIPATION.    SURGICAL DRESSING/BATHING: *MAY SHOWER TOMORROW**    FOLLOW-UP APPOINTMENT:  A follow-up appointment should be arranged with your doctor in *FOLLOW UP WITH DR. MCNEIL**; call to schedule.    You should CALL YOUR PHYSICIAN if you develop:  Fever greater than 101 degrees F.  Pain not relieved by medication, or persistent nausea or vomiting.  Excessive bleeding (blood soaking through dressing) or unexpected drainage from the wound.  Extreme redness or swelling around the incision site, drainage of pus or foul smelling drainage.  Inability to urinate or empty your bladder within 8 hours.  Problems with breathing or chest pain.    You should call 911 if you develop problems with breathing or chest pain.  If you are unable to contact your doctor or surgical center, you should go to the nearest emergency room or urgent care center.  Physician's telephone #: **DR. MCNEIL [phone]*    If any questions arise, call your doctor.  If your doctor is not available, please feel free to call the Surgical Center at [phone].   The Center is open Monday through Friday from 7AM to 7PM.  You can also call the HEALTH HOTLINE open 24 hours/day, 7 days/week and speak to a nurse at [phone], or toll free at [phone].    A registered nurse may call you a few days after your surgery to see how you are doing after your procedure.    MEDICATIONS: Resume taking daily medication.  Take prescribed pain medication with food.  If no medication is prescribed, you may take non-aspirin pain medication if needed.  PAIN MEDICATION CAN BE VERY CONSTIPATING.  Take a stool softener or laxative such as senokot, pericolace, or milk of magnesia if needed.    Prescription given for *NONE**.  Last pain medication given at *9:39 AM**.    If your physician has prescribed pain medication that includes Acetaminophen (Tylenol), do not take additional Acetaminophen (Tylenol) while taking the prescribed medication.    Depression / Suicide Risk    As you are discharged from this [facility] facility, it is important to learn how to keep safe from harming yourself.    Recognize the warning signs:  · Abrupt changes in personality, positive or negative- including increase in energy   · Giving away possessions  · Change in eating patterns- significant weight changes-  positive or negative  · Change in sleeping patterns- unable to sleep or sleeping all the time   · Unwillingness or inability to communicate  · Depression  · Unusual sadness, discouragement and loneliness  · Talk of wanting to die  · Neglect of personal appearance   · Rebelliousness- reckless behavior  · Withdrawal from people/activities they love  · Confusion- inability to concentrate     If you or a loved one observes any of these behaviors or has concerns about self-harm, here's what you can do:  · Talk about it- your feelings and reasons for harming yourself  · Remove any means that you might use to hurt yourself (examples: pills, rope, extension cords, firearm)  · Get professional help from the  community (Mental Health, Substance Abuse, psychological counseling)  · Do not be alone:Call your Safe Contact- someone whom you trust who will be there for you.  · Call your local CRISIS HOTLINE 905-6449 or 157-015-9559  · Call your local Children's Mobile Crisis Response Team [place] (022) 146-4147 or www.[employer]  · Call the toll free National Suicide Prevention Hotlines   · National Suicide Prevention Lifeline 599-165-BLIU (3119)  · National Hope Line Network 800-SUICIDE (139-3802)

## 2018-10-04 NOTE — PROGRESS NOTES
Outcome: pt has awv scheduled/ pt to get flu and hep b shot at pharmacy  Communication preference obtained  -pt did not want to review any other information stated she just had surgery and did not want to talk    Please transfer to Patient Outreach Team at [phone] when patient returns call.    WebIZ Checked & Epic Updated:  yes    [facility] Verified: yes    Attempt # 1

## 2018-10-08 ENCOUNTER — TELEPHONE (OUTPATIENT)
Dept: MEDICAL GROUP | Facility: MEDICAL CENTER | Age: 58
End: 2018-10-08

## 2018-10-08 DIAGNOSIS — K44.9 HIATAL HERNIA: ICD-10-CM

## 2018-10-08 NOTE — TELEPHONE ENCOUNTER
VOICEMAIL  1. Caller Name: Roxana Johansson                      Call Back Number: [phone] (home)     2. Message: Pt lvm requesting referral be placed to general surgery regarding repair of her hiatal hernia, please advise.      3. Patient approves office to leave a detailed voicemail/MyChart message: N\A

## 2018-10-19 DIAGNOSIS — E78.01 FAMILIAL HYPERCHOLESTEROLEMIA: ICD-10-CM

## 2018-10-22 RX ORDER — PRAVASTATIN SODIUM 20 MG
20 TABLET ORAL DAILY
Qty: 90 TAB | Refills: 3 | Status: SHIPPED | OUTPATIENT
Start: 2018-10-22 | End: 2018-10-28

## 2018-10-28 ENCOUNTER — APPOINTMENT (OUTPATIENT)
Dept: RADIOLOGY | Facility: MEDICAL CENTER | Age: 58
End: 2018-10-28
Attending: EMERGENCY MEDICINE
Payer: MEDICARE

## 2018-10-28 ENCOUNTER — HOSPITAL ENCOUNTER (OUTPATIENT)
Facility: MEDICAL CENTER | Age: 58
End: 2018-10-30
Attending: EMERGENCY MEDICINE | Admitting: HOSPITALIST
Payer: MEDICARE

## 2018-10-28 DIAGNOSIS — Z94.4 LIVER TRANSPLANT RECIPIENT (HCC): ICD-10-CM

## 2018-10-28 DIAGNOSIS — J06.9 UPPER RESPIRATORY TRACT INFECTION, UNSPECIFIED TYPE: ICD-10-CM

## 2018-10-28 DIAGNOSIS — R10.9 CHRONIC ABDOMINAL PAIN: ICD-10-CM

## 2018-10-28 DIAGNOSIS — G89.29 CHRONIC ABDOMINAL PAIN: ICD-10-CM

## 2018-10-28 DIAGNOSIS — D84.821 IMMUNOCOMPROMISED STATE DUE TO DRUG THERAPY (HCC): ICD-10-CM

## 2018-10-28 DIAGNOSIS — Z79.899 IMMUNOCOMPROMISED STATE DUE TO DRUG THERAPY (HCC): ICD-10-CM

## 2018-10-28 DIAGNOSIS — R06.02 SOB (SHORTNESS OF BREATH): ICD-10-CM

## 2018-10-28 PROBLEM — I95.9 HYPOTENSION: Status: ACTIVE | Noted: 2018-10-28

## 2018-10-28 PROBLEM — B34.9 VIRAL SYNDROME: Status: ACTIVE | Noted: 2018-10-28

## 2018-10-28 LAB
ALBUMIN SERPL BCP-MCNC: 3.4 G/DL (ref 3.2–4.9)
ALBUMIN/GLOB SERPL: 1.9 G/DL
ALP SERPL-CCNC: 30 U/L (ref 30–99)
ALT SERPL-CCNC: 14 U/L (ref 2–50)
ANION GAP SERPL CALC-SCNC: 6 MMOL/L (ref 0–11.9)
APPEARANCE UR: CLEAR
AST SERPL-CCNC: 20 U/L (ref 12–45)
BASOPHILS # BLD AUTO: 0.5 % (ref 0–1.8)
BASOPHILS # BLD: 0.02 K/UL (ref 0–0.12)
BILIRUB SERPL-MCNC: 0.4 MG/DL (ref 0.1–1.5)
BILIRUB UR QL STRIP.AUTO: NEGATIVE
BNP SERPL-MCNC: 221 PG/ML (ref 0–100)
BUN SERPL-MCNC: 14 MG/DL (ref 8–22)
CALCIUM SERPL-MCNC: 8.7 MG/DL (ref 8.5–10.5)
CHLORIDE SERPL-SCNC: 108 MMOL/L (ref 96–112)
CO2 SERPL-SCNC: 27 MMOL/L (ref 20–33)
COLOR UR: YELLOW
CREAT SERPL-MCNC: 0.88 MG/DL (ref 0.5–1.4)
EKG IMPRESSION: NORMAL
EOSINOPHIL # BLD AUTO: 0.05 K/UL (ref 0–0.51)
EOSINOPHIL NFR BLD: 1.2 % (ref 0–6.9)
ERYTHROCYTE [DISTWIDTH] IN BLOOD BY AUTOMATED COUNT: 46.8 FL (ref 35.9–50)
GLOBULIN SER CALC-MCNC: 1.8 G/DL (ref 1.9–3.5)
GLUCOSE SERPL-MCNC: 144 MG/DL (ref 65–99)
GLUCOSE UR STRIP.AUTO-MCNC: NEGATIVE MG/DL
HCT VFR BLD AUTO: 36 % (ref 37–47)
HGB BLD-MCNC: 11.4 G/DL (ref 12–16)
IMM GRANULOCYTES # BLD AUTO: 0.02 K/UL (ref 0–0.11)
IMM GRANULOCYTES NFR BLD AUTO: 0.5 % (ref 0–0.9)
KETONES UR STRIP.AUTO-MCNC: ABNORMAL MG/DL
LACTATE BLD-SCNC: 0.8 MMOL/L (ref 0.5–2)
LEUKOCYTE ESTERASE UR QL STRIP.AUTO: NEGATIVE
LYMPHOCYTES # BLD AUTO: 0.55 K/UL (ref 1–4.8)
LYMPHOCYTES NFR BLD: 13.2 % (ref 22–41)
MCH RBC QN AUTO: 29 PG (ref 27–33)
MCHC RBC AUTO-ENTMCNC: 31.7 G/DL (ref 33.6–35)
MCV RBC AUTO: 91.6 FL (ref 81.4–97.8)
MICRO URNS: ABNORMAL
MONOCYTES # BLD AUTO: 0.2 K/UL (ref 0–0.85)
MONOCYTES NFR BLD AUTO: 4.8 % (ref 0–13.4)
NEUTROPHILS # BLD AUTO: 3.33 K/UL (ref 2–7.15)
NEUTROPHILS NFR BLD: 79.8 % (ref 44–72)
NITRITE UR QL STRIP.AUTO: NEGATIVE
NRBC # BLD AUTO: 0 K/UL
NRBC BLD-RTO: 0 /100 WBC
PH UR STRIP.AUTO: 6.5 [PH]
PLATELET # BLD AUTO: 83 K/UL (ref 164–446)
PMV BLD AUTO: 8.8 FL (ref 9–12.9)
POTASSIUM SERPL-SCNC: 4.2 MMOL/L (ref 3.6–5.5)
PROT SERPL-MCNC: 5.2 G/DL (ref 6–8.2)
PROT UR QL STRIP: NEGATIVE MG/DL
RBC # BLD AUTO: 3.93 M/UL (ref 4.2–5.4)
RBC UR QL AUTO: NEGATIVE
SODIUM SERPL-SCNC: 141 MMOL/L (ref 135–145)
SP GR UR STRIP.AUTO: 1.02
TROPONIN I SERPL-MCNC: <0.01 NG/ML (ref 0–0.04)
UROBILINOGEN UR STRIP.AUTO-MCNC: 0.2 MG/DL
WBC # BLD AUTO: 4.2 K/UL (ref 4.8–10.8)

## 2018-10-28 PROCEDURE — 99220 PR INITIAL OBSERVATION CARE,LEVL III: CPT | Performed by: HOSPITALIST

## 2018-10-28 PROCEDURE — 83605 ASSAY OF LACTIC ACID: CPT

## 2018-10-28 PROCEDURE — 87086 URINE CULTURE/COLONY COUNT: CPT

## 2018-10-28 PROCEDURE — 700105 HCHG RX REV CODE 258: Performed by: EMERGENCY MEDICINE

## 2018-10-28 PROCEDURE — 96367 TX/PROPH/DG ADDL SEQ IV INF: CPT

## 2018-10-28 PROCEDURE — 81003 URINALYSIS AUTO W/O SCOPE: CPT

## 2018-10-28 PROCEDURE — 96375 TX/PRO/DX INJ NEW DRUG ADDON: CPT

## 2018-10-28 PROCEDURE — 700111 HCHG RX REV CODE 636 W/ 250 OVERRIDE (IP): Performed by: EMERGENCY MEDICINE

## 2018-10-28 PROCEDURE — 302135 SEQUENTIAL COMPRESSION MACHINE: Performed by: FAMILY MEDICINE

## 2018-10-28 PROCEDURE — 700111 HCHG RX REV CODE 636 W/ 250 OVERRIDE (IP): Performed by: HOSPITALIST

## 2018-10-28 PROCEDURE — 700111 HCHG RX REV CODE 636 W/ 250 OVERRIDE (IP)

## 2018-10-28 PROCEDURE — 99285 EMERGENCY DEPT VISIT HI MDM: CPT

## 2018-10-28 PROCEDURE — 71045 X-RAY EXAM CHEST 1 VIEW: CPT

## 2018-10-28 PROCEDURE — 36415 COLL VENOUS BLD VENIPUNCTURE: CPT

## 2018-10-28 PROCEDURE — G0378 HOSPITAL OBSERVATION PER HR: HCPCS

## 2018-10-28 PROCEDURE — 96376 TX/PRO/DX INJ SAME DRUG ADON: CPT

## 2018-10-28 PROCEDURE — 87040 BLOOD CULTURE FOR BACTERIA: CPT

## 2018-10-28 PROCEDURE — A9270 NON-COVERED ITEM OR SERVICE: HCPCS | Performed by: HOSPITALIST

## 2018-10-28 PROCEDURE — 84484 ASSAY OF TROPONIN QUANT: CPT

## 2018-10-28 PROCEDURE — 80053 COMPREHEN METABOLIC PANEL: CPT

## 2018-10-28 PROCEDURE — 700102 HCHG RX REV CODE 250 W/ 637 OVERRIDE(OP): Performed by: HOSPITALIST

## 2018-10-28 PROCEDURE — 304561 HCHG STAT O2

## 2018-10-28 PROCEDURE — 96365 THER/PROPH/DIAG IV INF INIT: CPT

## 2018-10-28 PROCEDURE — 83880 ASSAY OF NATRIURETIC PEPTIDE: CPT

## 2018-10-28 PROCEDURE — 93005 ELECTROCARDIOGRAM TRACING: CPT | Performed by: EMERGENCY MEDICINE

## 2018-10-28 PROCEDURE — 85025 COMPLETE CBC W/AUTO DIFF WBC: CPT

## 2018-10-28 RX ORDER — ONDANSETRON 4 MG/1
4 TABLET, ORALLY DISINTEGRATING ORAL EVERY 8 HOURS PRN
Status: DISCONTINUED | OUTPATIENT
Start: 2018-10-28 | End: 2018-10-30 | Stop reason: HOSPADM

## 2018-10-28 RX ORDER — HYDROMORPHONE HYDROCHLORIDE 2 MG/ML
1 INJECTION, SOLUTION INTRAMUSCULAR; INTRAVENOUS; SUBCUTANEOUS
Status: DISCONTINUED | OUTPATIENT
Start: 2018-10-28 | End: 2018-10-30 | Stop reason: HOSPADM

## 2018-10-28 RX ORDER — TACROLIMUS 0.5 MG/1
0.5 CAPSULE ORAL 2 TIMES DAILY
Status: DISCONTINUED | OUTPATIENT
Start: 2018-10-28 | End: 2018-10-28

## 2018-10-28 RX ORDER — ONDANSETRON 2 MG/ML
4 INJECTION INTRAMUSCULAR; INTRAVENOUS ONCE
Status: COMPLETED | OUTPATIENT
Start: 2018-10-28 | End: 2018-10-28

## 2018-10-28 RX ORDER — MORPHINE SULFATE 4 MG/ML
4 INJECTION, SOLUTION INTRAMUSCULAR; INTRAVENOUS ONCE
Status: COMPLETED | OUTPATIENT
Start: 2018-10-28 | End: 2018-10-28

## 2018-10-28 RX ORDER — SODIUM CHLORIDE 9 MG/ML
1000 INJECTION, SOLUTION INTRAVENOUS CONTINUOUS
Status: DISCONTINUED | OUTPATIENT
Start: 2018-10-28 | End: 2018-10-30 | Stop reason: HOSPADM

## 2018-10-28 RX ORDER — POLYETHYLENE GLYCOL 3350 17 G/17G
17 POWDER, FOR SOLUTION ORAL
Status: DISCONTINUED | OUTPATIENT
Start: 2018-10-28 | End: 2018-10-28

## 2018-10-28 RX ORDER — MYCOPHENOLATE MOFETIL 250 MG/1
500 CAPSULE ORAL 2 TIMES DAILY
Status: DISCONTINUED | OUTPATIENT
Start: 2018-10-28 | End: 2018-10-30 | Stop reason: HOSPADM

## 2018-10-28 RX ORDER — SERTRALINE HYDROCHLORIDE 100 MG/1
100 TABLET, FILM COATED ORAL
Status: DISCONTINUED | OUTPATIENT
Start: 2018-10-28 | End: 2018-10-30 | Stop reason: HOSPADM

## 2018-10-28 RX ORDER — TRAZODONE HYDROCHLORIDE 100 MG/1
100 TABLET ORAL
Status: DISCONTINUED | OUTPATIENT
Start: 2018-10-28 | End: 2018-10-30 | Stop reason: HOSPADM

## 2018-10-28 RX ORDER — PREDNISONE 1 MG/1
2 TABLET ORAL DAILY
Status: DISCONTINUED | OUTPATIENT
Start: 2018-10-28 | End: 2018-10-28

## 2018-10-28 RX ORDER — ONDANSETRON 4 MG/1
4 TABLET, ORALLY DISINTEGRATING ORAL EVERY 8 HOURS PRN
COMMUNITY
End: 2019-03-14 | Stop reason: SDUPTHER

## 2018-10-28 RX ORDER — ONDANSETRON 2 MG/ML
INJECTION INTRAMUSCULAR; INTRAVENOUS
Status: DISPENSED
Start: 2018-10-28 | End: 2018-10-29

## 2018-10-28 RX ORDER — PREDNISONE 5 MG/1
5 TABLET ORAL EVERY MORNING
COMMUNITY
End: 2019-05-06 | Stop reason: SDUPTHER

## 2018-10-28 RX ORDER — MORPHINE SULFATE 4 MG/ML
INJECTION, SOLUTION INTRAMUSCULAR; INTRAVENOUS
Status: DISPENSED
Start: 2018-10-28 | End: 2018-10-29

## 2018-10-28 RX ORDER — GABAPENTIN 300 MG/1
600 CAPSULE ORAL 3 TIMES DAILY
Status: DISCONTINUED | OUTPATIENT
Start: 2018-10-28 | End: 2018-10-30 | Stop reason: HOSPADM

## 2018-10-28 RX ORDER — TACROLIMUS 1 MG/1
1 CAPSULE ORAL 2 TIMES DAILY
Status: DISCONTINUED | OUTPATIENT
Start: 2018-10-28 | End: 2018-10-28

## 2018-10-28 RX ORDER — TACROLIMUS 1 MG/1
1 CAPSULE ORAL EVERY 12 HOURS
Status: ON HOLD | COMMUNITY
End: 2021-08-03

## 2018-10-28 RX ORDER — BISACODYL 10 MG
20 SUPPOSITORY, RECTAL RECTAL EVERY MORNING
COMMUNITY
End: 2019-03-04

## 2018-10-28 RX ORDER — LACTULOSE 10 G/15ML
20 SOLUTION ORAL DAILY
COMMUNITY
End: 2018-11-19

## 2018-10-28 RX ORDER — PRAVASTATIN SODIUM 20 MG
20 TABLET ORAL DAILY
Status: DISCONTINUED | OUTPATIENT
Start: 2018-10-28 | End: 2018-10-30 | Stop reason: HOSPADM

## 2018-10-28 RX ORDER — AMBRISENTAN 10 MG/1
10 TABLET, FILM COATED ORAL DAILY
COMMUNITY
End: 2019-01-14 | Stop reason: SDUPTHER

## 2018-10-28 RX ORDER — TIOTROPIUM BROMIDE 18 UG/1
1 CAPSULE ORAL; RESPIRATORY (INHALATION) DAILY
Status: DISCONTINUED | OUTPATIENT
Start: 2018-10-29 | End: 2018-10-30 | Stop reason: HOSPADM

## 2018-10-28 RX ORDER — AMBRISENTAN 10 MG/1
10 TABLET, FILM COATED ORAL EVERY EVENING
Status: DISCONTINUED | OUTPATIENT
Start: 2018-10-28 | End: 2018-10-30 | Stop reason: HOSPADM

## 2018-10-28 RX ORDER — BISACODYL 10 MG
10 SUPPOSITORY, RECTAL RECTAL DAILY
Status: DISCONTINUED | OUTPATIENT
Start: 2018-10-29 | End: 2018-10-30 | Stop reason: HOSPADM

## 2018-10-28 RX ORDER — TADALAFIL 20 MG/1
40 TABLET ORAL EVERY EVENING
Status: DISCONTINUED | OUTPATIENT
Start: 2018-10-29 | End: 2018-10-30 | Stop reason: HOSPADM

## 2018-10-28 RX ORDER — PREDNISONE 5 MG/1
5 TABLET ORAL DAILY
Status: DISCONTINUED | OUTPATIENT
Start: 2018-10-28 | End: 2018-10-28

## 2018-10-28 RX ORDER — MIDODRINE HYDROCHLORIDE 5 MG/1
10 TABLET ORAL
Status: DISCONTINUED | OUTPATIENT
Start: 2018-10-28 | End: 2018-10-30 | Stop reason: HOSPADM

## 2018-10-28 RX ORDER — LEVOTHYROXINE SODIUM 137 UG/1
137 TABLET ORAL
Status: DISCONTINUED | OUTPATIENT
Start: 2018-10-29 | End: 2018-10-30 | Stop reason: HOSPADM

## 2018-10-28 RX ORDER — ALPRAZOLAM 0.25 MG/1
1 TABLET ORAL 3 TIMES DAILY
Status: DISCONTINUED | OUTPATIENT
Start: 2018-10-28 | End: 2018-10-30 | Stop reason: HOSPADM

## 2018-10-28 RX ORDER — OXYCODONE HYDROCHLORIDE 5 MG/1
10 TABLET ORAL 3 TIMES DAILY
Status: DISCONTINUED | OUTPATIENT
Start: 2018-10-28 | End: 2018-10-30 | Stop reason: HOSPADM

## 2018-10-28 RX ORDER — MIDODRINE HYDROCHLORIDE 5 MG/1
5 TABLET ORAL PRN
Status: ON HOLD | COMMUNITY
End: 2018-10-30

## 2018-10-28 RX ORDER — AZITHROMYCIN 500 MG/1
500 INJECTION, POWDER, LYOPHILIZED, FOR SOLUTION INTRAVENOUS ONCE
Status: COMPLETED | OUTPATIENT
Start: 2018-10-28 | End: 2018-10-28

## 2018-10-28 RX ORDER — BISACODYL 10 MG
10 SUPPOSITORY, RECTAL RECTAL
Status: DISCONTINUED | OUTPATIENT
Start: 2018-10-28 | End: 2018-10-30 | Stop reason: HOSPADM

## 2018-10-28 RX ORDER — MIDODRINE HYDROCHLORIDE 5 MG/1
5 TABLET ORAL
Status: DISCONTINUED | OUTPATIENT
Start: 2018-10-28 | End: 2018-10-28

## 2018-10-28 RX ORDER — POLYETHYLENE GLYCOL 3350 17 G/17G
1 POWDER, FOR SOLUTION ORAL
Status: DISCONTINUED | OUTPATIENT
Start: 2018-10-29 | End: 2018-10-30 | Stop reason: HOSPADM

## 2018-10-28 RX ORDER — POLYETHYLENE GLYCOL 3350 17 G/17G
1 POWDER, FOR SOLUTION ORAL
Status: DISCONTINUED | OUTPATIENT
Start: 2018-10-28 | End: 2018-10-30 | Stop reason: HOSPADM

## 2018-10-28 RX ORDER — TADALAFIL 20 MG/1
40 TABLET ORAL EVERY EVENING
Status: DISCONTINUED | OUTPATIENT
Start: 2018-10-29 | End: 2018-10-28

## 2018-10-28 RX ORDER — AMOXICILLIN 250 MG
2 CAPSULE ORAL 2 TIMES DAILY
Status: DISCONTINUED | OUTPATIENT
Start: 2018-10-28 | End: 2018-10-30 | Stop reason: HOSPADM

## 2018-10-28 RX ORDER — FUROSEMIDE 40 MG/1
20 TABLET ORAL DAILY
Status: DISCONTINUED | OUTPATIENT
Start: 2018-10-29 | End: 2018-10-30 | Stop reason: HOSPADM

## 2018-10-28 RX ORDER — ONDANSETRON 4 MG/1
TABLET, ORALLY DISINTEGRATING ORAL
Status: COMPLETED
Start: 2018-10-28 | End: 2018-10-28

## 2018-10-28 RX ADMIN — CEFTRIAXONE SODIUM 1 G: 1 INJECTION, POWDER, FOR SOLUTION INTRAMUSCULAR; INTRAVENOUS at 13:46

## 2018-10-28 RX ADMIN — PRAVASTATIN SODIUM 20 MG: 20 TABLET ORAL at 20:29

## 2018-10-28 RX ADMIN — AMBRISENTAN 10 MG: 10 TABLET, FILM COATED ORAL at 20:29

## 2018-10-28 RX ADMIN — TACROLIMUS 1.5 MG: 1 CAPSULE ORAL at 20:30

## 2018-10-28 RX ADMIN — STANDARDIZED SENNA CONCENTRATE AND DOCUSATE SODIUM 2 TABLET: 8.6; 5 TABLET, FILM COATED ORAL at 20:24

## 2018-10-28 RX ADMIN — HYDROMORPHONE HYDROCHLORIDE 1 MG: 2 INJECTION INTRAMUSCULAR; INTRAVENOUS; SUBCUTANEOUS at 18:50

## 2018-10-28 RX ADMIN — MORPHINE SULFATE 4 MG: 4 INJECTION INTRAVENOUS at 13:44

## 2018-10-28 RX ADMIN — ONDANSETRON 4 MG: 4 TABLET, ORALLY DISINTEGRATING ORAL at 18:50

## 2018-10-28 RX ADMIN — SERTRALINE 100 MG: 100 TABLET, FILM COATED ORAL at 20:28

## 2018-10-28 RX ADMIN — MIDODRINE HYDROCHLORIDE 10 MG: 5 TABLET ORAL at 20:27

## 2018-10-28 RX ADMIN — HYDROMORPHONE HYDROCHLORIDE 1 MG: 2 INJECTION INTRAMUSCULAR; INTRAVENOUS; SUBCUTANEOUS at 21:30

## 2018-10-28 RX ADMIN — HYDROMORPHONE HYDROCHLORIDE 1 MG: 2 INJECTION INTRAMUSCULAR; INTRAVENOUS; SUBCUTANEOUS at 23:40

## 2018-10-28 RX ADMIN — ONDANSETRON 4 MG: 2 INJECTION INTRAMUSCULAR; INTRAVENOUS at 15:33

## 2018-10-28 RX ADMIN — TRAZODONE HYDROCHLORIDE 100 MG: 100 TABLET ORAL at 23:37

## 2018-10-28 RX ADMIN — MORPHINE SULFATE 4 MG: 4 INJECTION INTRAVENOUS at 15:33

## 2018-10-28 RX ADMIN — GABAPENTIN 600 MG: 300 CAPSULE ORAL at 20:25

## 2018-10-28 RX ADMIN — AZITHROMYCIN MONOHYDRATE 500 MG: 500 INJECTION, POWDER, LYOPHILIZED, FOR SOLUTION INTRAVENOUS at 15:43

## 2018-10-28 RX ADMIN — ALPRAZOLAM 1 MG: 0.25 TABLET ORAL at 20:25

## 2018-10-28 RX ADMIN — ONDANSETRON 4 MG: 2 INJECTION INTRAMUSCULAR; INTRAVENOUS at 13:44

## 2018-10-28 RX ADMIN — MYCOPHENOLATE MOFETIL 500 MG: 250 CAPSULE ORAL at 20:29

## 2018-10-28 RX ADMIN — SODIUM CHLORIDE 1000 ML: 9 INJECTION, SOLUTION INTRAVENOUS at 13:40

## 2018-10-28 ASSESSMENT — PAIN SCALES - GENERAL
PAINLEVEL_OUTOF10: 8
PAINLEVEL_OUTOF10: 7
PAINLEVEL_OUTOF10: 8
PAINLEVEL_OUTOF10: 8
PAINLEVEL_OUTOF10: 2
PAINLEVEL_OUTOF10: 8

## 2018-10-28 ASSESSMENT — COPD QUESTIONNAIRES
HAVE YOU SMOKED AT LEAST 100 CIGARETTES IN YOUR ENTIRE LIFE: NO/DON'T KNOW
DURING THE PAST 4 WEEKS HOW MUCH DID YOU FEEL SHORT OF BREATH: NONE/LITTLE OF THE TIME
HAVE YOU SMOKED AT LEAST 100 CIGARETTES IN YOUR ENTIRE LIFE: NO/DON'T KNOW
COPD SCREENING SCORE: 1
COPD SCREENING SCORE: 2
DO YOU EVER COUGH UP ANY MUCUS OR PHLEGM?: NO/ONLY WITH OCCASIONAL COLDS OR INFECTIONS
IN THE PAST 12 MONTHS DO YOU DO LESS THAN YOU USED TO BECAUSE OF YOUR BREATHING PROBLEMS: DISAGREE/UNSURE
DO YOU EVER COUGH UP ANY MUCUS OR PHLEGM?: NO/ONLY WITH OCCASIONAL COLDS OR INFECTIONS
DURING THE PAST 4 WEEKS HOW MUCH DID YOU FEEL SHORT OF BREATH: SOME OF THE TIME

## 2018-10-28 ASSESSMENT — ENCOUNTER SYMPTOMS
SHORTNESS OF BREATH: 1
NAUSEA: 1
DIARRHEA: 0
COUGH: 1
WEAKNESS: 1
ABDOMINAL PAIN: 1
VOMITING: 0
CHILLS: 1

## 2018-10-28 ASSESSMENT — LIFESTYLE VARIABLES
EVER_SMOKED: NEVER
EVER_SMOKED: NEVER
ALCOHOL_USE: NO

## 2018-10-28 ASSESSMENT — PATIENT HEALTH QUESTIONNAIRE - PHQ9
SUM OF ALL RESPONSES TO PHQ9 QUESTIONS 1 AND 2: 0
1. LITTLE INTEREST OR PLEASURE IN DOING THINGS: NOT AT ALL
2. FEELING DOWN, DEPRESSED, IRRITABLE, OR HOPELESS: NOT AT ALL

## 2018-10-28 NOTE — ED PROVIDER NOTES
ED Provider Note    Scribed for Tulio Miranda M.D. by González Shore. 10/28/2018, 12:44 PM.    Primary care provider: Elisa Phillip M.D.  Means of arrival: EMS  History obtained from: Patient  History limited by: None    CHIEF COMPLAINT  Chief Complaint   Patient presents with   • Abdominal Pain       HPI  Roxana Cuba is a 58 y.o. Female who wears oxygen at home and  presents to the Emergency Department for evaluation of cough and shortness of breath.  Patient does have a history of a liver transplant she also has continued pain in her abdomen that she describes as her spleen pain which is a secondary complaint for today.  Patient states she describes some increasing shortness of breath and cough she describes a green productivity to her cough.  The patient states that she has had pneumonia in the past and feels like she might be getting pneumonia again.  Patient also describes a continued pain to her left side.  She states that whenever she tends to get an illness she starts to have increasing pain in her abdomen and she feels that is her spleen.  Patient had multiple CT scans in the past for this she states that this is her typical pain but it is gotten worse in the left flank/chest wall region.  Patient denies any other symptoms states that she is not vomiting and I did directly ask her about the vomiting aspect.  Patient denies any diarrhea does describe fevers and chills and describes some cough and shortness of breath.  The patient is chronically on oxygen.    Roxana has a history of chronic lung disease and pulmonary hypertension. She also is concerned she may have pneumonia at this time.    REVIEW OF SYSTEMS  Review of Systems   Constitutional: Positive for chills.        Positive for: Tactile fever.   Respiratory: Positive for cough and shortness of breath.    Gastrointestinal: Positive for abdominal pain and nausea. Negative for diarrhea and vomiting.   All other systems reviewed and are  negative.      PAST MEDICAL HISTORY   has a past medical history of Anemia; Anesthesia; Breath shortness; Bronchitis ( ); Cardiomegaly; Chronic fatigue and malaise; Cirrhosis (HCC) (December 2011); CKD (chronic kidney disease) stage 3, GFR 30-59 ml/min (HCC); Diabetes (HCC); Fracture of left foot; GERD (gastroesophageal reflux disease); H/O Clostridium difficile infection (02-17-17); Hemorrhagic disorder (HCC); Hiatus hernia syndrome; High cholesterol; Hypothyroid; Jaundice (2009); Liver transplanted (HCC); Low back pain (02-17-17); Mild aortic regurgitation and aortic valve sclerosis ( ); On home oxygen therapy; Platelet disorder (HCC); Pneumonia; Psychiatric disorder; Pulmonary hypertension (HCC); S/P cholecystectomy; Small bowel obstruction (HCC); Splenomegaly; and VRE (vancomycin-resistant Enterococci).    SURGICAL HISTORY   has a past surgical history that includes anesth,nose,sinus surgery; makayla by laparoscopy (9/19/2009); exam under anesthesia (11/11/2009); hysterectomy, total abdominal; gastroscopy-endo (3/12/2010); bronchoscopy-endo (5/29/2012); bronchoscopy-endo (6/19/2012); sigmoidoscopy flex (9/26/2012); recovery (2/27/2013); bronchoscopy-endo (11/15/2013); recovery (1/21/2014); recovery (3/24/2014); hemorrhoidectomy (11/11/2009); gastroscopy (9/28/2012); carpal tunnel release; bone marrow aspiration (12/31/2012); bone marrow biopsy, ndl/trocar (12/31/2012); recovery (3/31/2014); other abdominal surgery (December 2011); bone marrow aspiration (3/16/2015); bone marrow biopsy, ndl/trocar (3/16/2015); lung biopsy open (11/2016); tonsillectomy; other abdominal surgery (); breast biopsy (Left, 2/21/2017); colonoscopy (N/A, 3/27/2017); gastroscopy (N/A, 3/27/2017); gastric bypass laparoscopic; hernia repair; makayla by laparoscopy; other; other (12/11/2017); other; turbinate reduction (Bilateral, 10/4/2018); and nasal reconstruction (Bilateral, 10/4/2018).    SOCIAL HISTORY  Social History   Substance  Use Topics   • Smoking status: Never Smoker   • Smokeless tobacco: Never Used      Comment: avoid all tobacco products   • Alcohol use No      Comment: 05/2009 quit drinking      History   Drug Use No       FAMILY HISTORY  Family History   Problem Relation Age of Onset   • Other Father         Unknown (dead 10 years)   • Diabetes Father    • Heart Disease Father    • Hypertension Father    • Hyperlipidemia Father    • Stroke Father    • Non-contributory Mother    • Hyperlipidemia Mother    • Alcohol/Drug Mother    • Cancer Paternal Aunt    • Alcohol/Drug Maternal Grandmother    • Alcohol/Drug Maternal Grandfather        CURRENT MEDICATIONS  Home Medications     Reviewed by Rosa Isela LockeD (Pharmacist) on 10/28/18 at 1834  Med List Status: Complete   Medication Last Dose Status   ALPRAZolam (XANAX) 1 MG Tab 10/28/2018 Active   ambrisentan (LETAIRIS) 10 MG tablet 10/27/2018 Active   ascorbic acid (ASCORBIC ACID) 500 MG Tab 10/28/2018 Active   aspirin 81 MG tablet 10/28/2018 Active   bisacodyl ([employer] GENTLE LAXATIVE) 10 MG Suppos 10/28/2018 Active   Calcium-Magnesium-Vitamin D (CITRACAL CALCIUM+D PO) 10/28/2018 Active   Carboxymethylcell-Hypromellose (GENTEAL) 0.25-0.3 % Gel 10/27/2018 Active   docusate sodium (COLACE) 100 MG Cap 10/28/2018 Active   ferrous sulfate (FEOSOL) 220 (44 Fe) MG/5ML Elixir 10/27/2018 Active   furosemide (LASIX) 20 MG Tab 10/28/2018 Active   gabapentin (NEURONTIN) 600 MG tablet 10/28/2018 Active   lactulose 10 GM/15ML Solution 10/28/2018 Active   levothyroxine (SYNTHROID) 137 MCG Tab 10/28/2018 Active   Linaclotide (LINZESS) 290 MCG Cap 10/28/2018 Active   methylnaltrexone (RELISTOR) 12 MG/0.6ML Solution 10/28/2018 Active   midodrine (PROAMATINE) 5 MG Tab 10/27/2018 Active   mycophenolate (CELLCEPT) 250 MG Cap 10/28/2018 Active   NON SPECIFIED 10/27/2018 Active   omeprazole (PRILOSEC) 40 MG delayed-release capsule 10/28/2018 Active   ondansetron (ZOFRAN ODT) 4 MG TABLET DISPERSIBLE  "10/27/2018 Active   oxyCODONE immediate release (ROXICODONE) 10 MG immediate release tablet 10/28/2018 Active   polyethylene glycol 3350 (MIRALAX) Powder 10/27/2018 Active   pravastatin (PRAVACHOL) 20 MG Tab 10/27/2018 Active   predniSONE (DELTASONE) 1 MG Tab 10/28/2018 Active   predniSONE (DELTASONE) 5 MG Tab 10/28/2018 Active   Probiotic Product (PROBIOTIC DAILY PO) 10/28/2018 Active   sertraline (ZOLOFT) 100 MG Tab 10/27/2018 Active   tacrolimus (PROGRAF) 0.5 MG Cap 10/28/2018 Active   tacrolimus (PROGRAF) 1 MG Cap 10/28/2018 Active   Tadalafil, PAH, (ADCIRCA) 20 MG Tab 10/28/2018 Active   tiotropium (SPIRIVA HANDIHALER) 18 MCG Cap 10/28/2018 Active   traZODone (DESYREL) 50 MG Tab 10/27/2018 Active   Wheat Dextrin (BENEFIBER) Powder 10/28/2018 Active                ALLERGIES  Allergies   Allergen Reactions   • Rhubarb Anaphylaxis   • Organic Nitrates      Nitroglycerin products should be avoided with the use of PDE-5 inhibitors as the combination can result in severe hypotension.   • Vancomycin Hcl      Causes red man syndrome when infused to fast     • Acetaminophen      Can not take, hx of liver transplant    • Nsaids      Can not take due to hx of liver transplant    • Other Drug      Any medication that may interact with cyclosporine, tacrolimus, sirolimus, or prograf (due to hx of liver transplant)        PHYSICAL EXAM  VITAL SIGNS: /48   Pulse 80   Temp 37.7 °C (99.8 °F)   Resp 20   Ht 1.727 m (5' 8\")   Wt 63.9 kg (140 lb 14 oz)   LMP 01/03/2000   SpO2 95%   BMI 21.42 kg/m²     Constitutional: Well developed, Well nourished, No acute distress, Non-toxic appearance.   HENT: Normocephalic, Atraumatic, Bilateral external ears normal, oropharynx moist, No oral exudates, Nose normal.   Eyes: Conjunctiva is normal, there are no signs of exudate.   Neck: Supple, no meningeal signs.  Lymphatic: No lymphadenopathy noted.   Cardiovascular: Tachycardic, Regular rhythm without murmurs gallops or rubs. "   Thorax & Lungs: Lungs diminished bilaterally with mild rhonchal noises, Lungs are clear to auscultation bilaterally, there are no wheezes no rales. Chest wall is nontender.  Abdomen: Mild tenderness in left upper quadrant, Surgical incision present which is well healed, Soft, nondistended. Bowel sounds are present.  Skin: Warm, Dry, No erythema,   Back: No tenderness, No CVA tenderness.  Musculoskeletal: Good range of motion in all major joints. No tenderness to palpation or major deformities noted. Intact distal pulses, no clubbing, no cyanosis, no edema,   Neurologic: Alert & oriented x 3, Moving all extremities. No gross abnormalities.    Psychiatric: Affect normal, Judgment normal, Mood normal.       LABS  Results for orders placed or performed during the hospital encounter of 10/28/18   LACTIC ACID   Result Value Ref Range    Lactic Acid 0.8 0.5 - 2.0 mmol/L   CBC WITH DIFFERENTIAL   Result Value Ref Range    WBC 4.2 (L) 4.8 - 10.8 K/uL    RBC 3.93 (L) 4.20 - 5.40 M/uL    Hemoglobin 11.4 (L) 12.0 - 16.0 g/dL    Hematocrit 36.0 (L) 37.0 - 47.0 %    MCV 91.6 81.4 - 97.8 fL    MCH 29.0 27.0 - 33.0 pg    MCHC 31.7 (L) 33.6 - 35.0 g/dL    RDW 46.8 35.9 - 50.0 fL    Platelet Count 83 (L) 164 - 446 K/uL    MPV 8.8 (L) 9.0 - 12.9 fL    Neutrophils-Polys 79.80 (H) 44.00 - 72.00 %    Lymphocytes 13.20 (L) 22.00 - 41.00 %    Monocytes 4.80 0.00 - 13.40 %    Eosinophils 1.20 0.00 - 6.90 %    Basophils 0.50 0.00 - 1.80 %    Immature Granulocytes 0.50 0.00 - 0.90 %    Nucleated RBC 0.00 /100 WBC    Neutrophils (Absolute) 3.33 2.00 - 7.15 K/uL    Lymphs (Absolute) 0.55 (L) 1.00 - 4.80 K/uL    Monos (Absolute) 0.20 0.00 - 0.85 K/uL    Eos (Absolute) 0.05 0.00 - 0.51 K/uL    Baso (Absolute) 0.02 0.00 - 0.12 K/uL    Immature Granulocytes (abs) 0.02 0.00 - 0.11 K/uL    NRBC (Absolute) 0.00 K/uL   COMP METABOLIC PANEL   Result Value Ref Range    Sodium 141 135 - 145 mmol/L    Potassium 4.2 3.6 - 5.5 mmol/L    Chloride 108 96 -  112 mmol/L    Co2 27 20 - 33 mmol/L    Anion Gap 6.0 0.0 - 11.9    Glucose 144 (H) 65 - 99 mg/dL    Bun 14 8 - 22 mg/dL    Creatinine 0.88 0.50 - 1.40 mg/dL    Calcium 8.7 8.5 - 10.5 mg/dL    AST(SGOT) 20 12 - 45 U/L    ALT(SGPT) 14 2 - 50 U/L    Alkaline Phosphatase 30 30 - 99 U/L    Total Bilirubin 0.4 0.1 - 1.5 mg/dL    Albumin 3.4 3.2 - 4.9 g/dL    Total Protein 5.2 (L) 6.0 - 8.2 g/dL    Globulin 1.8 (L) 1.9 - 3.5 g/dL    A-G Ratio 1.9 g/dL   URINALYSIS   Result Value Ref Range    Color Yellow     Character Clear     Specific Gravity 1.018 <1.035    Ph 6.5 5.0 - 8.0    Glucose Negative Negative mg/dL    Ketones Trace (A) Negative mg/dL    Protein Negative Negative mg/dL    Bilirubin Negative Negative    Urobilinogen, Urine 0.2 Negative    Nitrite Negative Negative    Leukocyte Esterase Negative Negative    Occult Blood Negative Negative    Micro Urine Req see below    TROPONIN   Result Value Ref Range    Troponin I <0.01 0.00 - 0.04 ng/mL   BTYPE NATRIURETIC PEPTIDE   Result Value Ref Range    B Natriuretic Peptide 221 (H) 0 - 100 pg/mL   ESTIMATED GFR   Result Value Ref Range    GFR If African American >60 >60 mL/min/1.73 m 2    GFR If Non African American >60 >60 mL/min/1.73 m 2   EKG   Result Value Ref Range    Report       [facility] Emergency Dept.    Test Date:  2018-10-28  Pt Name:    VICK LI             Department: ER  MRN:        [id number]                      Room:       [facility]  Gender:     Female                       Technician: [id number]  :        [id number]                   Requested By:MIKAL BERNARD  Order #:    [id number]                    Reading MD: MIKAL BERNARD MD    Measurements  Intervals                                Axis  Rate:       56                           P:          30  VT:         152                          QRS:        -2  QRSD:       74                           T:          26  QT:         452  QTc:        437    Interpretive Statements  SINUS  BRADYCARDIA  Compared to ECG 09/28/2018 14:48:38  Sinus rhythm no longer present  otherwise normal ECG  Electronically Signed On 10- 13:53:28 PDT by MIKAL BERNARD MD       *Note: Due to a large number of results and/or encounters for the requested time period, some results have not been displayed. A complete set of results can be found in Results Review.     All labs reviewed by me.    EKG  Interpreted by me    RADIOLOGY  DX-CHEST-PORTABLE (1 VIEW)   Final Result      Mild bibasilar atelectasis. No focal consolidation. No effusions.        The radiologist's interpretation of all radiological studies have been reviewed by me.    COURSE & MEDICAL DECISION MAKING  Pertinent Labs & Imaging studies reviewed. (See chart for details)    12:44 PM - Patient seen and examined at bedside. Patient will be treated with Zithromax 500 mg, and 1 L of IV fluids secondary to suspected sepsis. Ordered DX-Chest, Lactic Acid, CBC with differential, CMP, UA, Urine culture, Blood Culture, Troponin, BNP, and EKG to evaluate her symptoms. The differential diagnoses include but are not limited to: Pneumonia, Sepsis. Informed patient I will administer fluids, pain medications, get a chest X-Ray and labs for further treatment and evaluation.    1:32 PM - Patient will be treated with Rocephin 1 g, Morphine 4 mg, Zofran 4 mg, and Ondansetron 4 mg    2:30 PM - Patient was reevaluated at bedside who reports feeling mildly improved after receiving 1L of IV fluids. Discussed lab and radiology results with the patient and informed them that if they're continuing to feel poorly we can admit her to the hospital for continuous monitoring to which she agrees with.    2:42 PM - Paged the Hospitalist    Decision Making:  Patient presents for evaluation.  Clinically the patient is slightly ill in appearance lungs are diminished but clear chest x-ray shows no findings white blood cell count slightly low however, because of the patient's  symptomatology of fevers chills coughing potential for early pneumonia is there.  I did give her a fluid bolus for the potential of sepsis.  She states that she is feeling improved but states that her pain is still there and requires narcotic pain medications.  At this point we will admit the patient for initial antibiotic treatment and observation for blood cultures I have spoken to the hospitalist for admission of this patient.    DISPOSITION:  Patient will be admitted to Dr. Real, Hospitalist in guarded condition.     FINAL IMPRESSION  1. SOB (shortness of breath)    2. Upper respiratory tract infection, unspecified type    3. Immunocompromised state due to drug therapy    4. Chronic abdominal pain    5. Liver transplant recipient (HCC)          González BRODY (Scribe), am scribing for, and in the presence of, Tulio Miranda M.D..    Electronically signed by: González Shore (Scribe), 10/28/2018    Tulio BRODY M.D. personally performed the services described in this documentation, as scribed by González Shore in my presence, and it is both accurate and complete. C.    The note accurately reflects work and decisions made by me.  Tulio Miranda  10/28/2018  7:03 PM

## 2018-10-28 NOTE — ED TRIAGE NOTES
"Pt is a 59yo female presenting to ED w c/o 8/10 abdominal pain. Pt reports she is having pain in her \"spleen.\" Pt reports hx of liver TRN and lesions o her spleen. Pt reports pain has been constant x a few days. Pt reports n/v. Reports emesis is \"green bile.\" Ems established an IV and admin 100mcg Fentanyl and 4mg Zofran.   "

## 2018-10-28 NOTE — ED NOTES
Med rec updated and complete  Allergies reviewed  Pt had a list of medication, went over list of medications and returned list of medications back to pt.  Pt reports no antibiotics in the last 30 days.  Pt reports that she brought in her METHYLNALTREXONE 12MG/0.6ML.

## 2018-10-29 ENCOUNTER — PATIENT OUTREACH (OUTPATIENT)
Dept: HEALTH INFORMATION MANAGEMENT | Facility: OTHER | Age: 58
End: 2018-10-29

## 2018-10-29 LAB
ALBUMIN SERPL BCP-MCNC: 2.9 G/DL (ref 3.2–4.9)
ALBUMIN/GLOB SERPL: 1.6 G/DL
ALP SERPL-CCNC: 27 U/L (ref 30–99)
ALT SERPL-CCNC: 13 U/L (ref 2–50)
ANION GAP SERPL CALC-SCNC: 3 MMOL/L (ref 0–11.9)
AST SERPL-CCNC: 15 U/L (ref 12–45)
BASOPHILS # BLD AUTO: 0.6 % (ref 0–1.8)
BASOPHILS # BLD: 0.02 K/UL (ref 0–0.12)
BILIRUB SERPL-MCNC: 0.3 MG/DL (ref 0.1–1.5)
BUN SERPL-MCNC: 11 MG/DL (ref 8–22)
CALCIUM SERPL-MCNC: 8.4 MG/DL (ref 8.5–10.5)
CHLORIDE SERPL-SCNC: 110 MMOL/L (ref 96–112)
CO2 SERPL-SCNC: 30 MMOL/L (ref 20–33)
CREAT SERPL-MCNC: 0.79 MG/DL (ref 0.5–1.4)
EOSINOPHIL # BLD AUTO: 0.21 K/UL (ref 0–0.51)
EOSINOPHIL NFR BLD: 6 % (ref 0–6.9)
ERYTHROCYTE [DISTWIDTH] IN BLOOD BY AUTOMATED COUNT: 47.6 FL (ref 35.9–50)
FLUAV RNA SPEC QL NAA+PROBE: NEGATIVE
FLUBV RNA SPEC QL NAA+PROBE: NEGATIVE
GLOBULIN SER CALC-MCNC: 1.8 G/DL (ref 1.9–3.5)
GLUCOSE SERPL-MCNC: 124 MG/DL (ref 65–99)
HCT VFR BLD AUTO: 36.3 % (ref 37–47)
HGB BLD-MCNC: 11.9 G/DL (ref 12–16)
IMM GRANULOCYTES # BLD AUTO: 0.02 K/UL (ref 0–0.11)
IMM GRANULOCYTES NFR BLD AUTO: 0.6 % (ref 0–0.9)
LYMPHOCYTES # BLD AUTO: 1.01 K/UL (ref 1–4.8)
LYMPHOCYTES NFR BLD: 28.8 % (ref 22–41)
MCH RBC QN AUTO: 30.3 PG (ref 27–33)
MCHC RBC AUTO-ENTMCNC: 32.8 G/DL (ref 33.6–35)
MCV RBC AUTO: 92.4 FL (ref 81.4–97.8)
MONOCYTES # BLD AUTO: 0.31 K/UL (ref 0–0.85)
MONOCYTES NFR BLD AUTO: 8.8 % (ref 0–13.4)
NEUTROPHILS # BLD AUTO: 1.94 K/UL (ref 2–7.15)
NEUTROPHILS NFR BLD: 55.2 % (ref 44–72)
NRBC # BLD AUTO: 0 K/UL
NRBC BLD-RTO: 0 /100 WBC
PLATELET # BLD AUTO: 68 K/UL (ref 164–446)
PMV BLD AUTO: 9 FL (ref 9–12.9)
POTASSIUM SERPL-SCNC: 3.7 MMOL/L (ref 3.6–5.5)
PROT SERPL-MCNC: 4.7 G/DL (ref 6–8.2)
RBC # BLD AUTO: 3.93 M/UL (ref 4.2–5.4)
SODIUM SERPL-SCNC: 143 MMOL/L (ref 135–145)
WBC # BLD AUTO: 3.5 K/UL (ref 4.8–10.8)

## 2018-10-29 PROCEDURE — 700102 HCHG RX REV CODE 250 W/ 637 OVERRIDE(OP): Performed by: HOSPITALIST

## 2018-10-29 PROCEDURE — 87633 RESP VIRUS 12-25 TARGETS: CPT

## 2018-10-29 PROCEDURE — 87502 INFLUENZA DNA AMP PROBE: CPT

## 2018-10-29 PROCEDURE — 96372 THER/PROPH/DIAG INJ SC/IM: CPT

## 2018-10-29 PROCEDURE — 99226 PR SUBSEQUENT OBSERVATION CARE,LEVEL III: CPT | Performed by: HOSPITALIST

## 2018-10-29 PROCEDURE — A9270 NON-COVERED ITEM OR SERVICE: HCPCS | Performed by: HOSPITALIST

## 2018-10-29 PROCEDURE — G0378 HOSPITAL OBSERVATION PER HR: HCPCS

## 2018-10-29 PROCEDURE — 96376 TX/PRO/DX INJ SAME DRUG ADON: CPT

## 2018-10-29 PROCEDURE — 87486 CHLMYD PNEUM DNA AMP PROBE: CPT

## 2018-10-29 PROCEDURE — 85025 COMPLETE CBC W/AUTO DIFF WBC: CPT

## 2018-10-29 PROCEDURE — 80053 COMPREHEN METABOLIC PANEL: CPT

## 2018-10-29 PROCEDURE — 87581 M.PNEUMON DNA AMP PROBE: CPT

## 2018-10-29 PROCEDURE — 700111 HCHG RX REV CODE 636 W/ 250 OVERRIDE (IP): Performed by: HOSPITALIST

## 2018-10-29 RX ADMIN — HYDROMORPHONE HYDROCHLORIDE 1 MG: 2 INJECTION INTRAMUSCULAR; INTRAVENOUS; SUBCUTANEOUS at 12:36

## 2018-10-29 RX ADMIN — ALPRAZOLAM 1 MG: 0.25 TABLET ORAL at 17:59

## 2018-10-29 RX ADMIN — HYDROMORPHONE HYDROCHLORIDE 1 MG: 2 INJECTION INTRAMUSCULAR; INTRAVENOUS; SUBCUTANEOUS at 06:09

## 2018-10-29 RX ADMIN — MIDODRINE HYDROCHLORIDE 10 MG: 5 TABLET ORAL at 12:31

## 2018-10-29 RX ADMIN — TIOTROPIUM BROMIDE 1 CAPSULE: 18 CAPSULE ORAL; RESPIRATORY (INHALATION) at 05:12

## 2018-10-29 RX ADMIN — SERTRALINE 100 MG: 100 TABLET, FILM COATED ORAL at 23:02

## 2018-10-29 RX ADMIN — OXYCODONE HYDROCHLORIDE 10 MG: 5 TABLET ORAL at 18:00

## 2018-10-29 RX ADMIN — HYDROMORPHONE HYDROCHLORIDE 1 MG: 2 INJECTION INTRAMUSCULAR; INTRAVENOUS; SUBCUTANEOUS at 02:39

## 2018-10-29 RX ADMIN — ASPIRIN 81 MG: 81 TABLET, COATED ORAL at 05:08

## 2018-10-29 RX ADMIN — MYCOPHENOLATE MOFETIL 500 MG: 250 CAPSULE ORAL at 18:02

## 2018-10-29 RX ADMIN — AMBRISENTAN 10 MG: 10 TABLET, FILM COATED ORAL at 13:22

## 2018-10-29 RX ADMIN — HYDROMORPHONE HYDROCHLORIDE 1 MG: 2 INJECTION INTRAMUSCULAR; INTRAVENOUS; SUBCUTANEOUS at 18:06

## 2018-10-29 RX ADMIN — GABAPENTIN 600 MG: 300 CAPSULE ORAL at 12:29

## 2018-10-29 RX ADMIN — HYDROMORPHONE HYDROCHLORIDE 1 MG: 2 INJECTION INTRAMUSCULAR; INTRAVENOUS; SUBCUTANEOUS at 23:01

## 2018-10-29 RX ADMIN — GABAPENTIN 600 MG: 300 CAPSULE ORAL at 05:07

## 2018-10-29 RX ADMIN — STANDARDIZED SENNA CONCENTRATE AND DOCUSATE SODIUM 2 TABLET: 8.6; 5 TABLET, FILM COATED ORAL at 18:00

## 2018-10-29 RX ADMIN — OXYCODONE HYDROCHLORIDE 10 MG: 5 TABLET ORAL at 12:29

## 2018-10-29 RX ADMIN — GABAPENTIN 600 MG: 300 CAPSULE ORAL at 17:59

## 2018-10-29 RX ADMIN — HYDROMORPHONE HYDROCHLORIDE 1 MG: 2 INJECTION INTRAMUSCULAR; INTRAVENOUS; SUBCUTANEOUS at 10:20

## 2018-10-29 RX ADMIN — PRAVASTATIN SODIUM 20 MG: 20 TABLET ORAL at 05:08

## 2018-10-29 RX ADMIN — MIDODRINE HYDROCHLORIDE 10 MG: 5 TABLET ORAL at 18:02

## 2018-10-29 RX ADMIN — STANDARDIZED SENNA CONCENTRATE AND DOCUSATE SODIUM 2 TABLET: 8.6; 5 TABLET, FILM COATED ORAL at 05:09

## 2018-10-29 RX ADMIN — BISACODYL 10 MG: 10 SUPPOSITORY RECTAL at 06:01

## 2018-10-29 RX ADMIN — FUROSEMIDE 20 MG: 40 TABLET ORAL at 05:07

## 2018-10-29 RX ADMIN — METHYLNALTREXONE BROMIDE 12 MG: 12 INJECTION, SOLUTION SUBCUTANEOUS at 06:01

## 2018-10-29 RX ADMIN — TACROLIMUS 1.5 MG: 1 CAPSULE ORAL at 06:03

## 2018-10-29 RX ADMIN — HYDROMORPHONE HYDROCHLORIDE 1 MG: 2 INJECTION INTRAMUSCULAR; INTRAVENOUS; SUBCUTANEOUS at 20:39

## 2018-10-29 RX ADMIN — TACROLIMUS 1.5 MG: 1 CAPSULE ORAL at 18:01

## 2018-10-29 RX ADMIN — TADALAFIL 40 MG: 20 TABLET ORAL at 12:00

## 2018-10-29 RX ADMIN — ALPRAZOLAM 1 MG: 0.25 TABLET ORAL at 02:42

## 2018-10-29 RX ADMIN — ALPRAZOLAM 1 MG: 0.25 TABLET ORAL at 10:13

## 2018-10-29 RX ADMIN — PREDNISONE 7 MG: 1 TABLET ORAL at 05:11

## 2018-10-29 RX ADMIN — HYDROMORPHONE HYDROCHLORIDE 1 MG: 2 INJECTION INTRAMUSCULAR; INTRAVENOUS; SUBCUTANEOUS at 15:20

## 2018-10-29 RX ADMIN — MYCOPHENOLATE MOFETIL 500 MG: 250 CAPSULE ORAL at 06:03

## 2018-10-29 RX ADMIN — MIDODRINE HYDROCHLORIDE 5 MG: 5 TABLET ORAL at 05:09

## 2018-10-29 RX ADMIN — LEVOTHYROXINE SODIUM 137 MCG: 137 TABLET ORAL at 05:11

## 2018-10-29 RX ADMIN — HYDROMORPHONE HYDROCHLORIDE 1 MG: 2 INJECTION INTRAMUSCULAR; INTRAVENOUS; SUBCUTANEOUS at 08:20

## 2018-10-29 ASSESSMENT — ENCOUNTER SYMPTOMS
COUGH: 0
FEVER: 0
BLURRED VISION: 0
DIZZINESS: 0
SINUS PAIN: 1
ABDOMINAL PAIN: 0
VOMITING: 0
MYALGIAS: 1
CHILLS: 0
SHORTNESS OF BREATH: 1
DEPRESSION: 0
DOUBLE VISION: 0
HEADACHES: 0
SORE THROAT: 0
NAUSEA: 0

## 2018-10-29 ASSESSMENT — PAIN SCALES - GENERAL
PAINLEVEL_OUTOF10: 2
PAINLEVEL_OUTOF10: 8
PAINLEVEL_OUTOF10: 8
PAINLEVEL_OUTOF10: 6
PAINLEVEL_OUTOF10: 5

## 2018-10-29 NOTE — ASSESSMENT & PLAN NOTE
Right shin lesion with presumptive spleen lesion that is causing increased pain.  IV dilaudid due to severity of pain refractory to her oral home meds.

## 2018-10-29 NOTE — CARE PLAN
Problem: Communication  Goal: The ability to communicate needs accurately and effectively will improve  Outcome: DISCHARGED-GOAL NOT MET Date Met: 10/28/18

## 2018-10-29 NOTE — ASSESSMENT & PLAN NOTE
Presumptive diagnosis  No bacterial source is apparent  Given her immunocompromised state, [facility] has been consulted and state she is clear to discharge from their standpoint

## 2018-10-29 NOTE — PROGRESS NOTES
"[facility] Hospitalist Progress Note    Date of Service: 10/29/2018    Chief Complaint  58 y.o. female admitted 10/28/2018 with abdominal pain .   Patient has extensive medical history including liver transplant, pulmonary hypertension and sarcoidosis. She recently had surgery on her sinuses with repair of the nasal vestibular stenosis on 10/4, which she did well until 2 weeks ago when she developed \"a cold\". She know presents with \"spleen pain\" that reoccurs everytime she is ill, that requires IV dilaudid to manage.     Interval Problem Update  10/29- Patient states her pain is still present and stabbing in her left upper quadrant of abd rating 7.5/10. Does receive some relief with IV dilaudid. Verbal contract made with patient that she will only be receiving dilaudid for today and it will be stopped tomorrow. Patient agreed to this arrangement. Infectious disease was consulted on patient's request due to her immunocompromised state. Dr. Gavin saw patient and felt she could be discharged at this time. Likely viral syndrome, that she is recovering from. Patient stated pain not controlled and will discharge in morning and follow up with pain specialist. Flu swab negative. Patient had episode of hypotension in ER but has stabilized .     Consultants/Specialty  ID- Dr. Gavin ([facility])    Disposition  Home in am on her home medications to follow up with her pain specialist.         Review of Systems   Constitutional: Negative for chills, fever and malaise/fatigue.   HENT: Positive for congestion and sinus pain. Negative for sore throat.         Hoarse voice    Eyes: Negative for blurred vision and double vision.   Respiratory: Positive for shortness of breath (chronic). Negative for cough.    Cardiovascular: Negative for chest pain and leg swelling.   Gastrointestinal: Negative for abdominal pain, nausea and vomiting.   Genitourinary: Negative for dysuria, frequency and urgency.   Musculoskeletal: Positive " for myalgias.   Skin: Negative for rash.        Sarcoidosis scar to right shin    Neurological: Negative for dizziness and headaches.   Psychiatric/Behavioral: Negative for depression.      Physical Exam  Laboratory/Imaging   Hemodynamics  Temp (24hrs), Av °C (98.6 °F), Min:36.4 °C (97.6 °F), Max:37.3 °C (99.2 °F)   Temperature: 37.1 °C (98.7 °F)  Pulse  Av.7  Min: 54  Max: 90 Heart Rate (Monitored): 88  Blood Pressure: 155/74, NIBP: 107/49      Respiratory      Respiration: 16, Pulse Oximetry: 99 %        RUL Breath Sounds: Clear, RML Breath Sounds: Clear, RLL Breath Sounds: Diminished, MANJINDER Breath Sounds: Clear, LLL Breath Sounds: Diminished    Fluids    Intake/Output Summary (Last 24 hours) at 10/29/18 1445  Last data filed at 10/28/18 2000   Gross per 24 hour   Intake              100 ml   Output                0 ml   Net              100 ml       Nutrition  Orders Placed This Encounter   Procedures   • Diet Order Regular     Standing Status:   Standing     Number of Occurrences:   1     Order Specific Question:   Diet:     Answer:   Regular [1]     Physical Exam   Constitutional: She is oriented to person, place, and time. She appears well-developed. She is active and cooperative. No distress. Nasal cannula in place.   Pleasant patient    HENT:   Nose: Sinus tenderness present. Right sinus exhibits maxillary sinus tenderness and frontal sinus tenderness. Left sinus exhibits maxillary sinus tenderness and frontal sinus tenderness.   Mouth/Throat: Oropharynx is clear and moist.   Eyes: Conjunctivae and lids are normal.   Neck: Neck supple. Tracheal tenderness present.   Cardiovascular: Normal rate, regular rhythm and normal heart sounds.  Exam reveals no gallop.    No murmur heard.  Pulmonary/Chest: Effort normal. No respiratory distress. She has decreased breath sounds (in bases ). She has no wheezes. She has no rhonchi.   Abdominal: Soft. Normal appearance and bowel sounds are normal. She exhibits no  distension. There is tenderness in the left upper quadrant. There is no guarding.   Neurological: She is alert and oriented to person, place, and time. She has normal strength.   Skin: Skin is warm and dry. Lesion (sacroidosis on right shin ) noted. No pallor.   Psychiatric: She has a normal mood and affect. Her speech is normal and behavior is normal.   Nursing note and vitals reviewed.      Recent Labs      10/28/18   1240  10/29/18   0332   WBC  4.2*  3.5*   RBC  3.93*  3.93*   HEMOGLOBIN  11.4*  11.9*   HEMATOCRIT  36.0*  36.3*   MCV  91.6  92.4   MCH  29.0  30.3   MCHC  31.7*  32.8*   RDW  46.8  47.6   PLATELETCT  83*  68*   MPV  8.8*  9.0     Recent Labs      10/28/18   1240  10/29/18   0332   SODIUM  141  143   POTASSIUM  4.2  3.7   CHLORIDE  108  110   CO2  27  30   GLUCOSE  144*  124*   BUN  14  11   CREATININE  0.88  0.79   CALCIUM  8.7  8.4*         Recent Labs      10/28/18   1240   BNPBTYPENAT  221*              Assessment/Plan     * Viral syndrome- (present on admission)   Assessment & Plan    Presumptive diagnosis  No bacterial source is apparent  Given her immunocompromised state, [facility] has been consulted and state she is clear to discharge from their standpoint         Immunocompromised state (HCC)- (present on admission)   Assessment & Plan    Due to liver transplant        Pancytopenia (HCC)- (present on admission)   Assessment & Plan    Chronic secondary to liver transplant        Hypotension- (present on admission)   Assessment & Plan    Resolved, patient VSS within normal limits         Thrombocytopenia (HCC)- (present on admission)   Assessment & Plan    Platelets 83 on admit        History of Clostridium difficile infection- (present on admission)   Assessment & Plan    Refrain from antibiotics        On prednisone therapy- (present on admission)   Assessment & Plan    Continue 7 mg daily        Pulmonary hypertension (HCC)- (present on admission)   Assessment & Plan     Continue Tadalafil and Ambrisentan        Sarcoidosis (HCC)- (present on admission)   Assessment & Plan    Right shin lesion with presumptive spleen lesion that is causing increased pain.  IV dilaudid due to severity of pain refractory to her oral home meds.           Quality-Core Measures   Reviewed items::  Labs reviewed and Medications reviewed  Holbrook catheter::  No Holbrook  DVT prophylaxis pharmacological::  Not indicated at this time, ambulatory  DVT prophylaxis - mechanical:  SCDs  Ulcer Prophylaxis::  No

## 2018-10-29 NOTE — PROGRESS NOTES
Pt a&o. On 4L o2 as per baseline. Denies SOB. Ambulated to bathroom with steady gait. Afebrile. Complaints of left abdominal pain. Dilaudid given as ordered with good result. Plan of care reviewed including labs and pain management. Verbalized understanding and agrees. Able to make needs known.

## 2018-10-29 NOTE — CONSULTS
"DATE OF SERVICE:  10/29/2018    INFECTIOUS DISEASE CONSULTATION    CONSULTING PHYSICIAN:  Hector Álvarez MD    CHIEF COMPLAINT:  \"Splenic pain.\"    CONSULTING REASON:  Suspected viral syndrome.    HISTORY OF PRESENT ILLNESS:  This is a 58-year-old female, well known to   [facility] and our [facility] Infectious Disease Service who has a history of liver   transplant secondary to sarcoidosis and pulmonary hypertension as well as   recurrent aspiration pneumonia resulting in a Evon-en-Y surgery last year,   presents to the hospital with chief complaint of abdominal pain.  She recently   had a sinus repair of nasal vestibular stenosis by Dr. Erazo on 10/04/2018.    Subsequently, about a week ago, she started developing upper respiratory and   sinus issues with congestion, green discharge as well as a sudden loss of her   voice.  Eventually, her voice hoarseness has improved, but subsequently she   also complained of left upper quadrant pain where her spleen is.  She has a   history of splenomegaly as a result of her liver issue and has chronically   recurrently has related splenic pain; however, this was unbearable leading to   current evaluation.  Upon arrival, she did not exhibit any fever,   leukocytosis, or any transaminitis.  She was given pain medications and her   symptoms appear to be improving.  Additionally, because of her history of   recurrent aspiration pneumonia, chest x-ray was performed, did not show any   pneumonia at this time.  At this time, the primary team believes she has a   viral syndrome, but because of her history and her well familiarity with our   infectious disease service, she had requested ID evaluation just to make sure   there is no other etiology to her current symptoms.  She denies any fevers or   chills at this time.  Her abdominal pain is graded a 7/10 with Dilaudid.    Denies any diarrhea.  No cough.    REVIEW OF SYSTEMS:  A 14-point review of system was obtained and negative   except " for HPI.    PAST MEDICAL HISTORY:  1.  History of recurrent aspiration pneumonia.  2.  Sarcoidosis.  3.  Status post liver transplant secondary to sarcoidosis.  4.  Pulmonary hypertension, on 4 liters oxygen.  5.  Hypothyroidism.  6.  History of Clostridium difficile.  7.  Chronic kidney disease III.  8.  Splenomegaly.    PAST SURGICAL HISTORY:  Cholecystectomy, 4 sinus surgeries in the past, most   recent 10/04/2018; hysterectomy, Evon-en-Y surgery, gastric sleeve,   hemorrhoidectomy, lung biopsies.    FAMILY HISTORY:  Alcoholism in her maternal grandfather one of her aunt has   cancer, diabetes in her father as well as heart disease.    SOCIAL HISTORY:  She is a retired  at [employer].  No smoking   history.  No alcohol or drug abuse.    ALLERGIES:  VANCOMYCIN CAUSES RED MAN SYNDROME.    MEDICATIONS:  Xanax, Linzess, tadalafil, Letairis, aspirin 81, Lasix 20 mg   daily, gabapentin, levothyroxine 137 mcg, Relistor, midodrine, mycophenolate   500 twice a day, Roxicodone, pravastatin, prednisone 7 mg, Zoloft, tacrolimus   0.5 b.i.d., Spiriva, trazodone.    PHYSICAL EXAMINATION:  VITAL SIGNS:  Temperature 37.3, pulse 67, blood pressure 106/56, oxygen 93% on   4 liters.  GENERAL:  The patient is pleasant without acute distress.  HEENT:  Head is normocephalic, atraumatic.  She has a nasal cannula.  Oral   mucosa membranes moist.  Eyes:  PERRLA.  No scleral icterus or conjunctival   injection.  CARDIOVASCULAR:  Regular rate and rhythm.  S1, S2.  No murmurs.  RESPIRATORY:  Lungs are clear to auscultation bilaterally.  GASTROINTESTINAL:  Abdomen is soft, nondistended.  She has mild tenderness in   the left upper quadrant.  Spleen is 1 cm below the rib cage.  No other   abdominal tenderness, no guarding, no rebound.  MUSCULOSKELETAL:  No peripheral edema.  INTEGUMENTARY:  She has old hyperpigmented anterior 7x7 shin hyperpigmentation   with history of sarcoidosis.  PSYCHIATRIC:  She is alert and oriented  x3.  NEUROLOGIC:  No focal deficits noted.    LABORATORY DATA:  WBC 3000, hemoglobin 11.9, platelet count 68,000, neutrophil   55%, lymphocyte 28%, monocytes 8%.  Sodium 143, potassium 3.7, chloride 110,   bicarbonate is 30, BUN is 11, creatinine 0.79, glucose is 124.  BNP is 211.    Influenza A is negative.    IMAGING STUDIES:  Chest x-ray unremarkable.    ASSESSMENT:  1.  Viral syndrome.  2.  Viral upper respiratory infection.  3.  Viral tracheitis, resolved.  4.  History of splenomegaly.  5.  Liver transplant.  6.  Sarcoidosis.  7.  Chronic kidney disease III.  8.  Pulmonary hypertension.  9.  Ector's disease.    RECOMMENDATION:  I agree with the current assessment.  The patient appears to   have recently recovered from viral URI and tracheitis.  Her pain may be   related to that.  So far, I do not see any evidence of leukocytosis.  There is   no evidence of any atypical lymphocytes or transaminitis to suggest   mononucleosis.  At this point, I agree not to do any further intervention or   antivirals or antibiotics at this time.  1.  Continue with her antibiotics.  2.  Okay from ID standpoint for discharge.  3.  No further workup at this time.    Sixty-five minutes was spent in direct care of this patient.  I have discussed   the plan with the patient and the primary team.    Thank you for this consultation.  ID service will sign off at this time.    Please call with any further questions.       ____________________________________     DO HAZEL Pérez / SAVAGE    DD:  10/29/2018 12:03:10  DT:  10/29/2018 12:54:44    D#:  [id number]  Job#:  [id number]

## 2018-10-29 NOTE — RESPIRATORY CARE
COPD EDUCATION by COPD CLINICAL EDUCATOR  10/29/2018 at 8:08 AM by Mesha Miller     Patient reviewed by COPD education team. Patient does not qualify for COPD program.

## 2018-10-29 NOTE — H&P
"Hospital Medicine History & Physical Note    Date of Service  10/28/2018    Primary Care Physician  Elisa Phillip M.D.    Consultants  [facility] Infectious Disease    Code Status  Full code    Chief Complaint  Abdominal pain    History of Presenting Illness  58 y.o. female who presented 10/28/2018 with left upper quadrant pain. Ms. Cuba has a very complex medical history of a liver transplant and pulmonary hypertension that recently had surgery on her sinuses with repair of the nasal vestibular stenosis by  on 10/4.  She did well after that until about 2 weeks ago she developed \"a cold\" has been experiencing mucus is with green phlegm and nasal discharge.  For the past 3 days she states \"my spleen hurts\" pointing to the right upper quadrant.  She states is a sharp pain this tends to happen when she becomes ill.  Has been feeling dizzy and tired and \"no energy\".  She presented to the emergency room where her blood pressures been low with one reading being in the 80s over 40s.  At this point, Ms. Cuba's conditions appears to be most consistent with a viral syndrome and I am very recalcitrant to initiate antibiotics despite her immunocompromise state from liver transplant.  She is quite hypotensive therefore will initiate Midodrine which she has been on in the past.  She is requiring IV narcotics due to the severity of the left upper quadrant pain.  I do not believe that she requires further imaging as this is been an exacerbation of a very chronic condition of the left upper quadrant pain that is attributed to sarcoidosis.    Review of Systems  Review of Systems   Constitutional: Positive for malaise/fatigue.        She has felt warm  She has had ongoing weight loss after bariatric surgery   HENT:        Hoarse voice   Respiratory:        Chronically short of breath   Cardiovascular: Negative for chest pain.   Genitourinary: Negative for dysuria.   Neurological: Positive for weakness.   All other " systems reviewed and are negative.      Past Medical History   has a past medical history of Anemia; Anesthesia; Breath shortness; Bronchitis ( ); Cardiomegaly; Chronic fatigue and malaise; Cirrhosis (HCC) (December 2011); CKD (chronic kidney disease) stage 3, GFR 30-59 ml/min (HCC); Diabetes (HCC); Fracture of left foot; GERD (gastroesophageal reflux disease); H/O Clostridium difficile infection (02-17-17); Hemorrhagic disorder (HCC); Hiatus hernia syndrome; High cholesterol; Hypothyroid; Jaundice (2009); Liver transplanted (HCC); Low back pain (02-17-17); Mild aortic regurgitation and aortic valve sclerosis ( ); On home oxygen therapy; Platelet disorder (HCC); Pneumonia; Psychiatric disorder; Pulmonary hypertension (HCC); S/P cholecystectomy; Small bowel obstruction (HCC); Splenomegaly; and VRE (vancomycin-resistant Enterococci).  She is on 4 L nasal cannula oxygen 24 hours a day, pulmonary hypertension followed by Dr. Phan. Her hepatologist is Dr. Tenorio.     Surgical History   has a past surgical history that includes pr anesth,nose,sinus surgery; makayla by laparoscopy (9/19/2009); exam under anesthesia (11/11/2009); hysterectomy, total abdominal; gastroscopy-endo (3/12/2010); bronchoscopy-endo (5/29/2012); bronchoscopy-endo (6/19/2012); sigmoidoscopy flex (9/26/2012); recovery (2/27/2013); bronchoscopy-endo (11/15/2013); recovery (1/21/2014); recovery (3/24/2014); hemorrhoidectomy (11/11/2009); gastroscopy (9/28/2012); carpal tunnel release; bone marrow aspiration (12/31/2012); bone marrow biopsy, ndl/trocar (12/31/2012); recovery (3/31/2014); other abdominal surgery (December 2011); bone marrow aspiration (3/16/2015); bone marrow biopsy, ndl/trocar (3/16/2015); lung biopsy open (11/2016); tonsillectomy; other abdominal surgery (); breast biopsy (Left, 2/21/2017); colonoscopy (N/A, 3/27/2017); gastroscopy (N/A, 3/27/2017); gastric bypass laparoscopic; hernia repair; makayla by laparoscopy; other; other  (12/11/2017); other; turbinate reduction (Bilateral, 10/4/2018); and nasal reconstruction (Bilateral, 10/4/2018).     Family History  family history includes Alcohol/Drug in her maternal grandfather, maternal grandmother, and mother; Cancer in her paternal aunt; Diabetes in her father; Heart Disease in her father; Hyperlipidemia in her father and mother; Hypertension in her father; Non-contributory in her mother; Other in her father; Stroke in her father.     Social History   reports that she has never smoked. She has never used smokeless tobacco. She reports that she does not drink alcohol or use drugs.    Allergies  Allergies   Allergen Reactions   • Rhubarb Anaphylaxis   • Organic Nitrates      Nitroglycerin products should be avoided with the use of PDE-5 inhibitors as the combination can result in severe hypotension.   • Vancomycin Hcl      Causes red man syndrome when infused to fast     • Acetaminophen      Can not take, hx of liver transplant    • Nsaids      Can not take due to hx of liver transplant    • Other Drug      Any medication that may interact with cyclosporine, tacrolimus, sirolimus, or prograf (due to hx of liver transplant)        Medications  Prior to Admission Medications   Prescriptions Last Dose Informant Patient Reported? Taking?   ALPRAZolam (XANAX) 1 MG Tab 10/28/2018 at 1200 Patient No No   Sig: Take 1 Tab by mouth 3 times a day for 90 days.   Calcium-Magnesium-Vitamin D (CITRACAL CALCIUM+D PO) 10/28/2018 at 0800 Patient Yes No   Sig: Take 1 Tab by mouth every day.   Carboxymethylcell-Hypromellose (GENTEAL) 0.25-0.3 % Gel 10/27/2018 at 2200 Patient Yes No   Sig: Place 0.3 mg in both eyes every bedtime.   Linaclotide (LINZESS) 290 MCG Cap 10/28/2018 at 0800 Patient No No   Sig: Take 1 Cap by mouth every day.   NON SPECIFIED 10/27/2018 at 2100 Patient Yes No   Sig: Take 1 Cap by mouth every evening. Bariatric Dietary Supplment    Probiotic Product (PROBIOTIC DAILY PO) 10/28/2018 at 0600  Patient Yes No   Sig: Take 1 Cap by mouth every day.   Tadalafil, PAH, (ADCIRCA) 20 MG Tab 10/28/2018 at 1200 Patient Yes No   Sig: Take 40 mg by mouth every day.   Wheat Dextrin (BENEFIBER) Powder 10/28/2018 at 0600 Patient Yes No   Sig: Take 1 Package by mouth every day.   ambrisentan (LETAIRIS) 10 MG tablet 10/27/2018 at 2000 Patient Yes Yes   Sig: Take 10 mg by mouth every evening.   ascorbic acid (ASCORBIC ACID) 500 MG Tab 10/28/2018 at 0800 Patient Yes No   Sig: Take 500 mg by mouth every day.   aspirin 81 MG tablet 10/28/2018 at 0800 Patient No No   Sig: Take 1 Tab by mouth every day.   bisacodyl ([employer] GENTLE LAXATIVE) 10 MG Suppos 10/28/2018 at 0600 Patient Yes Yes   Sig: Insert 10 mg in rectum every day.   docusate sodium (COLACE) 100 MG Cap 10/28/2018 at 1200 Patient Yes No   Sig: Take 100 mg by mouth 3 times a day.   ferrous sulfate (FEOSOL) 220 (44 Fe) MG/5ML Elixir 10/27/2018 at 2000 Patient Yes Yes   Sig: Take 220 mg by mouth every evening.   furosemide (LASIX) 20 MG Tab 10/28/2018 at 0800 Patient Yes No   Sig: Take 20 mg by mouth every day.   gabapentin (NEURONTIN) 600 MG tablet 10/28/2018 at 1200 Patient No No   Sig: TAKE 1 TABLET BY MOUTH 3 TIMES A DAY.   lactulose 10 GM/15ML Solution 10/28/2018 at 0700 Patient Yes Yes   Sig: Take 20 g by mouth every day.   levothyroxine (SYNTHROID) 137 MCG Tab 10/28/2018 at 0600 Patient No No   Sig: Take 1 Tab by mouth Every morning on an empty stomach.   methylnaltrexone (RELISTOR) 12 MG/0.6ML Solution 10/28/2018 at 0600 Patient Yes Yes   Sig: Inject 12 mg as instructed every day.   midodrine (PROAMATINE) 5 MG Tab 10/27/2018 at 2200 Patient Yes Yes   Sig: Take 5 mg by mouth as needed (Pt reports that she takes the blood pressure numbers and divides it by 2 and if it comes out to 75 she takes this medications.). Pt reports that she takes the blood pressure numbers and divides it by 2 and if it comes out to 75 or higher she takes this medications.   mycophenolate  (CELLCEPT) 250 MG Cap 10/28/2018 at 0800 Patient Yes No   Sig: Take 500 mg by mouth 2 times a day.   omeprazole (PRILOSEC) 40 MG delayed-release capsule 10/28/2018 at 0800 Patient No No   Sig: Take 1 Cap by mouth every day.   ondansetron (ZOFRAN ODT) 4 MG TABLET DISPERSIBLE 10/27/2018 at 1500 Patient Yes Yes   Sig: Take 4 mg by mouth every 8 hours as needed for Nausea.   oxyCODONE immediate release (ROXICODONE) 10 MG immediate release tablet 10/28/2018 at 1200 Patient Yes No   Sig: Take 10 mg by mouth 3 times a day.   polyethylene glycol 3350 (MIRALAX) Powder 10/27/2018 at 0600 Patient Yes No   Sig: Take 17 g by mouth every 48 hours.   pravastatin (PRAVACHOL) 20 MG Tab 10/27/2018 at 1800 Patient No No   Sig: Take 1 Tab by mouth every day.   predniSONE (DELTASONE) 1 MG Tab 10/28/2018 at 0600 Patient Yes No   Sig: Take 2 mg by mouth every day. Pt also has an RX for 5MG, pt takes total of 7MG QDAY   predniSONE (DELTASONE) 5 MG Tab 10/28/2018 at 0600 Patient Yes Yes   Sig: Take 5 mg by mouth every day. Pt also has an RX for 2MG, pt takes total of 7MG QDAY   sertraline (ZOLOFT) 100 MG Tab 10/27/2018 at 2100 Patient No No   Sig: Take 1 Tab by mouth every bedtime.   tacrolimus (PROGRAF) 0.5 MG Cap 10/28/2018 at 0800 Patient Yes No   Sig: Take 0.5 mg by mouth 2 times a day. Pt also has an RX for 1MG, pt takes total of 1.5MG BID   tacrolimus (PROGRAF) 1 MG Cap 10/28/2018 at 0800 Patient Yes Yes   Sig: Take 1 mg by mouth 2 times a day. Pt also has an RX for 0.5MG, pt takes total of 1.5MG BID   tiotropium (SPIRIVA HANDIHALER) 18 MCG Cap 10/28/2018 at 0600 Patient No No   Sig: Inhale 1 Cap by mouth every day.   traZODone (DESYREL) 50 MG Tab 10/27/2018 at 2100 Patient No No   Sig: Take 2 Tabs by mouth every bedtime.      Facility-Administered Medications: None       Physical Exam  Temp:  [37.1 °C (98.7 °F)-37.7 °C (99.8 °F)] 37.1 °C (98.7 °F)  Pulse:  [54-90] 66  Resp:  [12-20] 15  BP: (111-120)/(48-55) 111/55    Physical Exam    Constitutional: She is oriented to person, place, and time. No distress.   Neck: Neck supple.   Left EJ peripheral IV   Cardiovascular: Normal rate.    No murmur heard.  Pulmonary/Chest:   Few crackles, good air movement.  No rhonchil   Abdominal: Soft.   Left upper quadrant tenderness.  Well-healed liver transplant scar.   Musculoskeletal: She exhibits no edema or tenderness.   Neurological: She is alert and oriented to person, place, and time.   Skin: She is not diaphoretic.   Right shin brown skin lesion   Psychiatric: She has a normal mood and affect. Her behavior is normal.   Nursing note and vitals reviewed.      Laboratory:  Recent Labs      10/28/18   1240   WBC  4.2*   RBC  3.93*   HEMOGLOBIN  11.4*   HEMATOCRIT  36.0*   MCV  91.6   MCH  29.0   MCHC  31.7*   RDW  46.8   PLATELETCT  83*   MPV  8.8*     Recent Labs      10/28/18   1240   SODIUM  141   POTASSIUM  4.2   CHLORIDE  108   CO2  27   GLUCOSE  144*   BUN  14   CREATININE  0.88   CALCIUM  8.7     Recent Labs      10/28/18   1240   ALTSGPT  14   ASTSGOT  20   ALKPHOSPHAT  30   TBILIRUBIN  0.4   GLUCOSE  144*         Recent Labs      10/28/18   1240   BNPBTYPENAT  221*         Recent Labs      10/28/18   1240   TROPONINI  <0.01       Urinalysis:    Recent Labs      10/28/18   1553   SPECGRAVITY  1.018   GLUCOSEUR  Negative   KETONES  Trace*   NITRITE  Negative   LEUKESTERAS  Negative        Imaging:  DX-CHEST-PORTABLE (1 VIEW)   Final Result      Mild bibasilar atelectasis. No focal consolidation. No effusions.            Assessment/Plan:  I anticipate this patient is appropriate for observation status at this time.    * Hypotension- (present on admission)   Assessment & Plan    Blood pressure has dropped down to 80's/40's in the ER  She does not appear to be septic  Start midodrine 10 mg TID        Viral syndrome- (present on admission)   Assessment & Plan    Presumptive diagnosis  No bacterial source is apparent  Given her immunocompromised state,  [facility] has been consulted and will see her on 10/29        Immunocompromised state (HCC)- (present on admission)   Assessment & Plan    Due to liver transplant        Pancytopenia (HCC)- (present on admission)   Assessment & Plan    Chronic secondary to liver transplant        Thrombocytopenia (HCC)- (present on admission)   Assessment & Plan    Platelets 83 on admit        History of Clostridium difficile infection- (present on admission)   Assessment & Plan    Refrain from antibiotics        On prednisone therapy- (present on admission)   Assessment & Plan    Continue 7 mg daily        Pulmonary hypertension (HCC)- (present on admission)   Assessment & Plan    Continue Tadalafil and Ambrisentan        Sarcoidosis (HCC)- (present on admission)   Assessment & Plan    Right shin lesion with presumptive spleen lesion that is causing increased pain.  IV dilaudid due to severity of pain refractory to her oral home meds.           Labs have been ordered for the morning.   VTE prophylaxis: SCDs

## 2018-10-30 ENCOUNTER — TELEPHONE (OUTPATIENT)
Dept: MEDICAL GROUP | Facility: LAB | Age: 58
End: 2018-10-30

## 2018-10-30 VITALS
HEART RATE: 62 BPM | TEMPERATURE: 97.6 F | DIASTOLIC BLOOD PRESSURE: 55 MMHG | SYSTOLIC BLOOD PRESSURE: 97 MMHG | RESPIRATION RATE: 18 BRPM | HEIGHT: 68 IN | BODY MASS INDEX: 21.75 KG/M2 | WEIGHT: 143.52 LBS | OXYGEN SATURATION: 98 %

## 2018-10-30 LAB
BACTERIA UR CULT: NORMAL
SIGNIFICANT IND 70042: NORMAL
SITE SITE: NORMAL
SOURCE SOURCE: NORMAL

## 2018-10-30 PROCEDURE — 99217 PR OBSERVATION CARE DISCHARGE: CPT | Performed by: INTERNAL MEDICINE

## 2018-10-30 PROCEDURE — G0378 HOSPITAL OBSERVATION PER HR: HCPCS

## 2018-10-30 PROCEDURE — 96372 THER/PROPH/DIAG INJ SC/IM: CPT

## 2018-10-30 PROCEDURE — 700111 HCHG RX REV CODE 636 W/ 250 OVERRIDE (IP): Performed by: HOSPITALIST

## 2018-10-30 PROCEDURE — 700102 HCHG RX REV CODE 250 W/ 637 OVERRIDE(OP): Performed by: HOSPITALIST

## 2018-10-30 PROCEDURE — 96376 TX/PRO/DX INJ SAME DRUG ADON: CPT

## 2018-10-30 PROCEDURE — A9270 NON-COVERED ITEM OR SERVICE: HCPCS | Performed by: HOSPITALIST

## 2018-10-30 RX ORDER — MIDODRINE HYDROCHLORIDE 10 MG/1
10 TABLET ORAL
Qty: 60 TAB | Refills: 3 | Status: SHIPPED | OUTPATIENT
Start: 2018-10-30 | End: 2018-11-19

## 2018-10-30 RX ADMIN — HYDROMORPHONE HYDROCHLORIDE 1 MG: 2 INJECTION INTRAMUSCULAR; INTRAVENOUS; SUBCUTANEOUS at 04:15

## 2018-10-30 RX ADMIN — FUROSEMIDE 20 MG: 40 TABLET ORAL at 05:46

## 2018-10-30 RX ADMIN — TACROLIMUS 1.5 MG: 1 CAPSULE ORAL at 05:46

## 2018-10-30 RX ADMIN — LEVOTHYROXINE SODIUM 137 MCG: 137 TABLET ORAL at 06:26

## 2018-10-30 RX ADMIN — HYDROMORPHONE HYDROCHLORIDE 1 MG: 2 INJECTION INTRAMUSCULAR; INTRAVENOUS; SUBCUTANEOUS at 01:40

## 2018-10-30 RX ADMIN — OXYCODONE HYDROCHLORIDE 10 MG: 5 TABLET ORAL at 08:02

## 2018-10-30 RX ADMIN — BISACODYL 10 MG: 10 SUPPOSITORY RECTAL at 05:45

## 2018-10-30 RX ADMIN — METHYLNALTREXONE BROMIDE 12 MG: 12 INJECTION, SOLUTION SUBCUTANEOUS at 05:46

## 2018-10-30 RX ADMIN — MYCOPHENOLATE MOFETIL 500 MG: 250 CAPSULE ORAL at 05:47

## 2018-10-30 RX ADMIN — PREDNISONE 7 MG: 1 TABLET ORAL at 05:47

## 2018-10-30 RX ADMIN — HYDROMORPHONE HYDROCHLORIDE 1 MG: 2 INJECTION INTRAMUSCULAR; INTRAVENOUS; SUBCUTANEOUS at 06:25

## 2018-10-30 RX ADMIN — GABAPENTIN 600 MG: 300 CAPSULE ORAL at 05:46

## 2018-10-30 RX ADMIN — MIDODRINE HYDROCHLORIDE 10 MG: 5 TABLET ORAL at 08:03

## 2018-10-30 RX ADMIN — STANDARDIZED SENNA CONCENTRATE AND DOCUSATE SODIUM 2 TABLET: 8.6; 5 TABLET, FILM COATED ORAL at 05:46

## 2018-10-30 RX ADMIN — ALPRAZOLAM 1 MG: 0.25 TABLET ORAL at 01:50

## 2018-10-30 RX ADMIN — TIOTROPIUM BROMIDE 1 CAPSULE: 18 CAPSULE ORAL; RESPIRATORY (INHALATION) at 05:46

## 2018-10-30 ASSESSMENT — PAIN SCALES - GENERAL
PAINLEVEL_OUTOF10: 7
PAINLEVEL_OUTOF10: 8

## 2018-10-30 NOTE — PROGRESS NOTES
Pt a&o x4. Still complaining of left sided abdominal pain. Dilaudid given PRN as ordered. Ambulatory to bathroom with steady gait. On 4L o2 nc per home regimen. Taking sips of water and juice. Afebrile. Plan for discharge in am and patient is aware.

## 2018-10-30 NOTE — PROGRESS NOTES
Assumed patient care. Aware about today discharged order. Oral pain medication as schedule. Got pain management MD to follow out patient.

## 2018-10-30 NOTE — DISCHARGE INSTRUCTIONS
Discharge Instructions    Discharged to home by car with relative. Discharged via walking, hospital escort: Yes.  Special equipment needed: Not Applicable    Be sure to schedule a follow-up appointment with your primary care doctor or any specialists as instructed.     Discharge Plan:   Diet Plan: Discussed  Activity Level: Discussed  Confirmed Follow up Appointment: Patient to Call and Schedule Appointment  Confirmed Symptoms Management: Discussed  Medication Reconciliation Updated: Yes  Influenza Vaccine Indication: Patient Refuses    I understand that a diet low in cholesterol, fat, and sodium is recommended for good health. Unless I have been given specific instructions below for another diet, I accept this instruction as my diet prescription.   Other diet: Regular Diet    Special Instructions: None    · Is patient discharged on Warfarin / Coumadin?   No     Depression / Suicide Risk    As you are discharged from this [facility] facility, it is important to learn how to keep safe from harming yourself.    Recognize the warning signs:  · Abrupt changes in personality, positive or negative- including increase in energy   · Giving away possessions  · Change in eating patterns- significant weight changes-  positive or negative  · Change in sleeping patterns- unable to sleep or sleeping all the time   · Unwillingness or inability to communicate  · Depression  · Unusual sadness, discouragement and loneliness  · Talk of wanting to die  · Neglect of personal appearance   · Rebelliousness- reckless behavior  · Withdrawal from people/activities they love  · Confusion- inability to concentrate     If you or a loved one observes any of these behaviors or has concerns about self-harm, here's what you can do:  · Talk about it- your feelings and reasons for harming yourself  · Remove any means that you might use to hurt yourself (examples: pills, rope, extension cords, firearm)  · Get professional help from the community  (Mental Health, Substance Abuse, psychological counseling)  · Do not be alone:Call your Safe Contact- someone whom you trust who will be there for you.  · Call your local CRISIS HOTLINE 097-3010 or 920-118-8805  · Call your local Children's Mobile Crisis Response Team [place] (680) 158-4349 or www.HomeTouch  · Call the toll free National Suicide Prevention Hotlines   · National Suicide Prevention Lifeline 341-192-QRQI (2087)  · National Hope Line Network 800-SUICIDE (945-3175)

## 2018-10-30 NOTE — DISCHARGE SUMMARY
Discharge Summary    CHIEF COMPLAINT ON ADMISSION  Chief Complaint   Patient presents with   • Abdominal Pain       Reason for Admission  EMS 60     Admission Date  10/28/2018    CODE STATUS  Full Code    HPI & HOSPITAL COURSE  This is a 58 y.o. female with a past medical history of liver transplant, pulmonary hypertension, and sarcoidosis here with complaints of worsening left upper quadrant abdominal pain.  The patient recently underwent a sinus repair of nasal vestibular stenosis by Dr. Erazo on 10/4/2018.  She has since developed symptoms consistent with viral URI with congestion, cough, and green discharge.  On admission the patient had no evidence of fever or leukocytosis and was felt to be experiencing a viral syndrome.  Chest x-ray was negative for pneumonia.  She was incidentally found to be hypotensive, although the patient does have a history of this and currently takes Midodrine as an outpatient.  Her midodrine dose was increased and her blood pressure subsequently was much improved.  She has a history of recurrent splenic pain secondary to her underlying liver issues.  Her current left upper quadrant pain appears to be splenic in nature that is exacerbated secondary to her underlying viral infection.  She was assessed by our infectious disease physician and was not recommended to begin antibiotics.  She remained in the hospital for pain control and has had progressive improvement of her pain.  She follows with a pain specialist as an outpatient and is recommended to continue to do this for her chronic pain issues.  At this time as her symptoms are improving she has no further need for acute intervention she is safe to be discharged.       Therefore, she is discharged in good and stable condition to home with close outpatient follow-up.    Discharge Date  10/30/2018    FOLLOW UP ITEMS POST DISCHARGE  She will follow with her outpatient pain specialist for further recommendations concerning her chronic  pain.    DISCHARGE DIAGNOSES  Principal Problem:    Viral syndrome POA: Yes  Active Problems:    Pancytopenia (HCC) POA: Yes    Immunocompromised state (HCC) POA: Yes    Status post liver transplant Dr. Canada ([facility]) POA: Yes      Overview: -December 2011: Status post liver transplant for end stage liver disease       at [facility], followed by Dr. Canada.          Sarcoidosis (HCC) POA: Yes    Pulmonary hypertension (HCC) POA: Yes      Overview: Initial evaluation at [facility] pre liver transplant, PFO closed w/o       complication, 1/25/2011, then worse PAH and R HF post transplant,       extensive evaluations at [facility] in [place] with cath and drug trial, responding       to tadalafil and ambrisentan dramatically. Followed there with serial R       heart cath.      3/25/2014: Most recent R heart cath done Dr. Brenda Flores with mild       pulmonary hypertension and relatively high ouput      November 2014: Echocardiogram with normal LV size, LVEF 60-65%. Mild MR,       mild AI, mild TR, RVSP 29-34mmHg.      February 2015: Echocardiogram with mild concentric LVH, LVEF 65-70%. Mild       MR, mild AI, trace TR, RVSP 16mmHg.    On prednisone therapy POA: Yes    History of Clostridium difficile infection POA: Yes    Thrombocytopenia (HCC) POA: Yes    Hypotension POA: Yes  Resolved Problems:    * No resolved hospital problems. *      FOLLOW UP  Future Appointments  Date Time Provider Department Center   11/7/2018 8:00 AM [facility] None   11/27/2018 9:00 AM Elisa Phillip M.D. [facility] None   11/29/2018 9:30 AM Maxwell Phan M.D. [facility] None   12/4/2018 9:00 AM Elisa Phillip M.D. [facility] None   12/5/2018 8:00 AM [facility] None   3/14/2019 10:30 AM [facility] None       MEDICATIONS ON DISCHARGE     Medication List      CHANGE how you take these medications      Instructions   midodrine 10 MG tablet  What changed:  · medication strength  · how much to take  · when to take this  · reasons to  take this  · additional instructions  Commonly known as:  PROAMATINE   Take 1 Tab by mouth 3 times a day, with meals.  Dose:  10 mg        CONTINUE taking these medications      Instructions   ADCIRCA 20 MG Tabs  Generic drug:  Tadalafil (PAH)   Take 40 mg by mouth every day.  Dose:  40 mg     ALPRAZolam 1 MG Tabs  Commonly known as:  XANAX   Doctor's comments:  May fill on or after 9/14/18  Take 1 Tab by mouth 3 times a day for 90 days.  Dose:  1 mg     ascorbic acid 500 MG Tabs  Commonly known as:  ascorbic acid   Take 500 mg by mouth every day.  Dose:  500 mg     aspirin 81 MG tablet   Take 1 Tab by mouth every day.  Dose:  81 mg     BENEFIBER Powd   Take 1 Package by mouth every day.  Dose:  1 Package     CELLCEPT 250 MG Caps  Generic drug:  mycophenolate   Doctor's comments:  liver transplant  Take 500 mg by mouth 2 times a day.  Dose:  500 mg     CITRACAL CALCIUM+D PO   Take 1 Tab by mouth every day.  Dose:  1 Tab     [employer] GENTLE LAXATIVE 10 MG Supp  Generic drug:  bisacodyl   Insert 10 mg in rectum every day.  Dose:  10 mg     docusate sodium 100 MG Caps  Commonly known as:  COLACE   Take 100 mg by mouth 3 times a day.  Dose:  100 mg     ferrous sulfate 220 (44 Fe) MG/5ML Elix  Commonly known as:  FEOSOL   Take 220 mg by mouth every evening.  Dose:  220 mg     furosemide 20 MG Tabs  Commonly known as:  LASIX   Take 20 mg by mouth every day.  Dose:  20 mg     gabapentin 600 MG tablet  Commonly known as:  NEURONTIN   TAKE 1 TABLET BY MOUTH 3 TIMES A DAY.     GENTEAL 0.25-0.3 % Gel  Generic drug:  Carboxymethylcell-Hypromellose   Place 0.3 mg in both eyes every bedtime.  Dose:  0.3 mg     lactulose 10 GM/15ML Soln   Take 20 g by mouth every day.  Dose:  20 g     LETAIRIS 10 MG tablet  Generic drug:  ambrisentan   Take 10 mg by mouth every evening.  Dose:  10 mg     levothyroxine 137 MCG Tabs  Commonly known as:  SYNTHROID   Take 1 Tab by mouth Every morning on an empty stomach.  Dose:  137 mcg     Linaclotide 290  MCG Caps  Commonly known as:  LINZESS   Take 1 Cap by mouth every day.  Dose:  1 Cap     methylnaltrexone 12 MG/0.6ML Soln  Commonly known as:  RELISTOR   Inject 12 mg as instructed every day.  Dose:  12 mg     NON SPECIFIED   Take 1 Cap by mouth every evening. Bariatric Dietary Supplment  Dose:  1 Cap     omeprazole 40 MG delayed-release capsule  Commonly known as:  PRILOSEC   Take 1 Cap by mouth every day.  Dose:  40 mg     ondansetron 4 MG Tbdp  Commonly known as:  ZOFRAN ODT   Take 4 mg by mouth every 8 hours as needed for Nausea.  Dose:  4 mg     oxyCODONE immediate release 10 MG immediate release tablet  Commonly known as:  ROXICODONE   Take 10 mg by mouth 3 times a day.  Dose:  10 mg     polyethylene glycol 3350 Powd  Commonly known as:  MIRALAX   Take 17 g by mouth every 48 hours.  Dose:  17 g     pravastatin 20 MG Tabs  Commonly known as:  PRAVACHOL   Take 1 Tab by mouth every day.  Dose:  20 mg     * predniSONE 1 MG Tabs  Commonly known as:  DELTASONE   Take 2 mg by mouth every day. Pt also has an RX for 5MG, pt takes total of 7MG QDAY  Dose:  2 mg     * predniSONE 5 MG Tabs  Commonly known as:  DELTASONE   Take 5 mg by mouth every day. Pt also has an RX for 2MG, pt takes total of 7MG QDAY  Dose:  5 mg     PROBIOTIC DAILY PO   Take 1 Cap by mouth every day.  Dose:  1 Cap     sertraline 100 MG Tabs  Commonly known as:  ZOLOFT   Take 1 Tab by mouth every bedtime.  Dose:  100 mg     tacrolimus 0.5 MG Caps  Commonly known as:  PROGRAF   Take 0.5 mg by mouth 2 times a day. Pt also has an RX for 1MG, pt takes total of 1.5MG BID  Dose:  0.5 mg     tacrolimus 1 MG Caps  Commonly known as:  PROGRAF   Take 1 mg by mouth 2 times a day. Pt also has an RX for 0.5MG, pt takes total of 1.5MG BID  Dose:  1 mg     tiotropium 18 MCG Caps  Commonly known as:  SPIRIVA HANDIHALER   Doctor's comments:  sample  Inhale 1 Cap by mouth every day.  Dose:  18 mcg     traZODone 50 MG Tabs  Commonly known as:  DESYREL   Take 2 Tabs by  mouth every bedtime.  Dose:  100 mg        * This list has 2 medication(s) that are the same as other medications prescribed for you. Read the directions carefully, and ask your doctor or other care provider to review them with you.                Allergies  Allergies   Allergen Reactions   • Rhubarb Anaphylaxis   • Organic Nitrates      Nitroglycerin products should be avoided with the use of PDE-5 inhibitors as the combination can result in severe hypotension.   • Vancomycin Hcl      Causes red man syndrome when infused to fast     • Acetaminophen      Can not take, hx of liver transplant    • Nsaids      Can not take due to hx of liver transplant    • Other Drug      Any medication that may interact with cyclosporine, tacrolimus, sirolimus, or prograf (due to hx of liver transplant)        DIET  Orders Placed This Encounter   Procedures   • Diet Order Regular     Standing Status:   Standing     Number of Occurrences:   1     Order Specific Question:   Diet:     Answer:   Regular [1]       ACTIVITY  As tolerated.    LABORATORY  Lab Results   Component Value Date    SODIUM 143 10/29/2018    POTASSIUM 3.7 10/29/2018    CHLORIDE 110 10/29/2018    CO2 30 10/29/2018    GLUCOSE 124 (H) 10/29/2018    BUN 11 10/29/2018    CREATININE 0.79 10/29/2018        Lab Results   Component Value Date    WBC 3.5 (L) 10/29/2018    HEMOGLOBIN 11.9 (L) 10/29/2018    HEMATOCRIT 36.3 (L) 10/29/2018    PLATELETCT 68 (L) 10/29/2018        Total time of the discharge process was 36 minutes.

## 2018-10-30 NOTE — PROGRESS NOTES
I returned patient's home medication to her in anticipation of discharge later today (Letairis and Tadalafil).

## 2018-10-30 NOTE — CARE PLAN
Problem: Bowel/Gastric:  Goal: Normal bowel function is maintained or improved  Pt on bowel regimen. Had bm yesterday. No n/v. Abdomen soft.     Problem: Skin Integrity  Goal: Risk for impaired skin integrity will decrease    Intervention: Assess risk factors for impaired skin integrity and/or pressure ulcers  Pt turns self in bed. No skin breakdown noted. Ambulatory.

## 2018-10-31 ENCOUNTER — PATIENT OUTREACH (OUTPATIENT)
Dept: HEALTH INFORMATION MANAGEMENT | Facility: OTHER | Age: 58
End: 2018-10-31

## 2018-10-31 LAB
C PNEUM DNA SPEC QL NAA+PROBE: NOT DETECTED
FLUAV H1 2009 PAND RNA SPEC QL NAA+PROBE: NOT DETECTED
FLUAV H1 RNA SPEC QL NAA+PROBE: NOT DETECTED
FLUAV H3 RNA SPEC QL NAA+PROBE: NOT DETECTED
FLUAV RNA SPEC QL NAA+PROBE: NOT DETECTED
FLUBV RNA SPEC QL NAA+PROBE: NOT DETECTED
HADV DNA SPEC QL NAA+PROBE: NOT DETECTED
HCOV RNA SPEC QL NAA+PROBE: NOT DETECTED
HMPV RNA SPEC QL NAA+PROBE: NOT DETECTED
HPIV1 RNA SPEC QL NAA+PROBE: NOT DETECTED
HPIV2 RNA SPEC QL NAA+PROBE: NOT DETECTED
HPIV3 RNA SPEC QL NAA+PROBE: NOT DETECTED
HPIV4 RNA SPEC QL NAA+PROBE: NOT DETECTED
M PNEUMO DNA SPEC QL NAA+PROBE: NOT DETECTED
RSV A RNA SPEC QL NAA+PROBE: NOT DETECTED
RSV B RNA SPEC QL NAA+PROBE: NOT DETECTED
RV+EV RNA SPEC QL NAA+PROBE: NOT DETECTED

## 2018-11-06 ENCOUNTER — APPOINTMENT (OUTPATIENT)
Dept: ADMISSIONS | Facility: MEDICAL CENTER | Age: 58
End: 2018-11-06
Attending: SURGERY
Payer: MEDICARE

## 2018-11-12 ENCOUNTER — HOSPITAL ENCOUNTER (OUTPATIENT)
Facility: MEDICAL CENTER | Age: 58
End: 2018-11-13
Attending: SURGERY | Admitting: SURGERY
Payer: MEDICARE

## 2018-11-12 DIAGNOSIS — G89.18 POSTOPERATIVE PAIN: ICD-10-CM

## 2018-11-12 LAB — GLUCOSE BLD-MCNC: 103 MG/DL (ref 65–99)

## 2018-11-12 PROCEDURE — A9270 NON-COVERED ITEM OR SERVICE: HCPCS | Performed by: PHYSICIAN ASSISTANT

## 2018-11-12 PROCEDURE — 82962 GLUCOSE BLOOD TEST: CPT

## 2018-11-12 PROCEDURE — 700111 HCHG RX REV CODE 636 W/ 250 OVERRIDE (IP): Performed by: SURGERY

## 2018-11-12 PROCEDURE — 160048 HCHG OR STATISTICAL LEVEL 1-5: Performed by: SURGERY

## 2018-11-12 PROCEDURE — 700102 HCHG RX REV CODE 250 W/ 637 OVERRIDE(OP): Performed by: ANESTHESIOLOGY

## 2018-11-12 PROCEDURE — A9270 NON-COVERED ITEM OR SERVICE: HCPCS | Performed by: ANESTHESIOLOGY

## 2018-11-12 PROCEDURE — 700111 HCHG RX REV CODE 636 W/ 250 OVERRIDE (IP): Performed by: PHYSICIAN ASSISTANT

## 2018-11-12 PROCEDURE — 160035 HCHG PACU - 1ST 60 MINS PHASE I: Performed by: SURGERY

## 2018-11-12 PROCEDURE — 96374 THER/PROPH/DIAG INJ IV PUSH: CPT

## 2018-11-12 PROCEDURE — 160036 HCHG PACU - EA ADDL 30 MINS PHASE I: Performed by: SURGERY

## 2018-11-12 PROCEDURE — 700101 HCHG RX REV CODE 250

## 2018-11-12 PROCEDURE — C1781 MESH (IMPLANTABLE): HCPCS | Performed by: SURGERY

## 2018-11-12 PROCEDURE — 700111 HCHG RX REV CODE 636 W/ 250 OVERRIDE (IP): Performed by: ANESTHESIOLOGY

## 2018-11-12 PROCEDURE — 700102 HCHG RX REV CODE 250 W/ 637 OVERRIDE(OP): Performed by: PHYSICIAN ASSISTANT

## 2018-11-12 PROCEDURE — 501664 HCHG TUBING, FILTER STRYKER: Performed by: SURGERY

## 2018-11-12 PROCEDURE — 700112 HCHG RX REV CODE 229: Performed by: PHYSICIAN ASSISTANT

## 2018-11-12 PROCEDURE — 502714 HCHG ROBOTIC SURGERY SERVICES: Performed by: SURGERY

## 2018-11-12 PROCEDURE — 96375 TX/PRO/DX INJ NEW DRUG ADDON: CPT

## 2018-11-12 PROCEDURE — 160002 HCHG RECOVERY MINUTES (STAT): Performed by: SURGERY

## 2018-11-12 PROCEDURE — 160009 HCHG ANES TIME/MIN: Performed by: SURGERY

## 2018-11-12 PROCEDURE — 501497 HCHG SURGICLIP: Performed by: SURGERY

## 2018-11-12 PROCEDURE — 501838 HCHG SUTURE GENERAL: Performed by: SURGERY

## 2018-11-12 PROCEDURE — G0378 HOSPITAL OBSERVATION PER HR: HCPCS

## 2018-11-12 PROCEDURE — 700111 HCHG RX REV CODE 636 W/ 250 OVERRIDE (IP)

## 2018-11-12 PROCEDURE — 160031 HCHG SURGERY MINUTES - 1ST 30 MINS LEVEL 5: Performed by: SURGERY

## 2018-11-12 PROCEDURE — 700105 HCHG RX REV CODE 258: Performed by: PHYSICIAN ASSISTANT

## 2018-11-12 PROCEDURE — 500868 HCHG NEEDLE, SURGI(VARES): Performed by: SURGERY

## 2018-11-12 PROCEDURE — 160042 HCHG SURGERY MINUTES - EA ADDL 1 MIN LEVEL 5: Performed by: SURGERY

## 2018-11-12 DEVICE — MESH VENTRALIGHT ST 4.5IN 1EA/CA: Type: IMPLANTABLE DEVICE | Status: FUNCTIONAL

## 2018-11-12 RX ORDER — ONDANSETRON 4 MG/1
4 TABLET, ORALLY DISINTEGRATING ORAL EVERY 4 HOURS PRN
Status: DISCONTINUED | OUTPATIENT
Start: 2018-11-12 | End: 2018-11-13 | Stop reason: HOSPADM

## 2018-11-12 RX ORDER — ONDANSETRON 2 MG/ML
4 INJECTION INTRAMUSCULAR; INTRAVENOUS EVERY 4 HOURS PRN
Status: DISCONTINUED | OUTPATIENT
Start: 2018-11-12 | End: 2018-11-13 | Stop reason: HOSPADM

## 2018-11-12 RX ORDER — PREDNISONE 1 MG/1
2 TABLET ORAL DAILY
Status: DISCONTINUED | OUTPATIENT
Start: 2018-11-13 | End: 2018-11-13 | Stop reason: HOSPADM

## 2018-11-12 RX ORDER — TACROLIMUS 0.5 MG/1
1.5 CAPSULE ORAL 2 TIMES DAILY
Status: DISCONTINUED | OUTPATIENT
Start: 2018-11-12 | End: 2018-11-13 | Stop reason: HOSPADM

## 2018-11-12 RX ORDER — ONDANSETRON 2 MG/ML
4 INJECTION INTRAMUSCULAR; INTRAVENOUS
Status: DISCONTINUED | OUTPATIENT
Start: 2018-11-12 | End: 2018-11-12 | Stop reason: HOSPADM

## 2018-11-12 RX ORDER — OXYCODONE HYDROCHLORIDE 5 MG/1
5 TABLET ORAL EVERY 4 HOURS PRN
Status: DISCONTINUED | OUTPATIENT
Start: 2018-11-12 | End: 2018-11-12

## 2018-11-12 RX ORDER — LEVOTHYROXINE SODIUM 137 UG/1
137 TABLET ORAL
Status: DISCONTINUED | OUTPATIENT
Start: 2018-11-13 | End: 2018-11-13 | Stop reason: HOSPADM

## 2018-11-12 RX ORDER — HYDROMORPHONE HYDROCHLORIDE 1 MG/ML
0.4 INJECTION, SOLUTION INTRAMUSCULAR; INTRAVENOUS; SUBCUTANEOUS
Status: DISCONTINUED | OUTPATIENT
Start: 2018-11-12 | End: 2018-11-12 | Stop reason: HOSPADM

## 2018-11-12 RX ORDER — AMBRISENTAN 10 MG/1
10 TABLET, FILM COATED ORAL DAILY
Status: DISCONTINUED | OUTPATIENT
Start: 2018-11-12 | End: 2018-11-13 | Stop reason: HOSPADM

## 2018-11-12 RX ORDER — HALOPERIDOL 5 MG/ML
1 INJECTION INTRAMUSCULAR
Status: DISCONTINUED | OUTPATIENT
Start: 2018-11-12 | End: 2018-11-12 | Stop reason: HOSPADM

## 2018-11-12 RX ORDER — MIDODRINE HYDROCHLORIDE 5 MG/1
10 TABLET ORAL
Status: DISCONTINUED | OUTPATIENT
Start: 2018-11-12 | End: 2018-11-13 | Stop reason: HOSPADM

## 2018-11-12 RX ORDER — MIDAZOLAM HYDROCHLORIDE 1 MG/ML
1 INJECTION INTRAMUSCULAR; INTRAVENOUS
Status: COMPLETED | OUTPATIENT
Start: 2018-11-12 | End: 2018-11-12

## 2018-11-12 RX ORDER — ENALAPRILAT 1.25 MG/ML
2.5 INJECTION INTRAVENOUS EVERY 6 HOURS PRN
Status: DISCONTINUED | OUTPATIENT
Start: 2018-11-12 | End: 2018-11-13 | Stop reason: HOSPADM

## 2018-11-12 RX ORDER — PROMETHAZINE HYDROCHLORIDE 25 MG/1
25 SUPPOSITORY RECTAL EVERY 6 HOURS PRN
Status: DISCONTINUED | OUTPATIENT
Start: 2018-11-12 | End: 2018-11-13 | Stop reason: HOSPADM

## 2018-11-12 RX ORDER — MEPERIDINE HYDROCHLORIDE 25 MG/ML
12.5 INJECTION INTRAMUSCULAR; INTRAVENOUS; SUBCUTANEOUS
Status: DISCONTINUED | OUTPATIENT
Start: 2018-11-12 | End: 2018-11-12 | Stop reason: HOSPADM

## 2018-11-12 RX ORDER — OXYCODONE HYDROCHLORIDE 5 MG/1
10 TABLET ORAL EVERY 4 HOURS PRN
Status: DISCONTINUED | OUTPATIENT
Start: 2018-11-12 | End: 2018-11-12

## 2018-11-12 RX ORDER — GABAPENTIN 300 MG/1
600 CAPSULE ORAL 3 TIMES DAILY
Status: DISCONTINUED | OUTPATIENT
Start: 2018-11-12 | End: 2018-11-13 | Stop reason: HOSPADM

## 2018-11-12 RX ORDER — LACTULOSE 20 G/30ML
30 SOLUTION ORAL EVERY EVENING
Status: DISCONTINUED | OUTPATIENT
Start: 2018-11-12 | End: 2018-11-13 | Stop reason: HOSPADM

## 2018-11-12 RX ORDER — TIOTROPIUM BROMIDE 18 UG/1
1 CAPSULE ORAL; RESPIRATORY (INHALATION) DAILY
Status: DISCONTINUED | OUTPATIENT
Start: 2018-11-13 | End: 2018-11-13 | Stop reason: HOSPADM

## 2018-11-12 RX ORDER — SCOLOPAMINE TRANSDERMAL SYSTEM 1 MG/1
1 PATCH, EXTENDED RELEASE TRANSDERMAL
Status: DISCONTINUED | OUTPATIENT
Start: 2018-11-12 | End: 2018-11-13 | Stop reason: HOSPADM

## 2018-11-12 RX ORDER — BISACODYL 10 MG
10 SUPPOSITORY, RECTAL RECTAL DAILY
Status: DISCONTINUED | OUTPATIENT
Start: 2018-11-13 | End: 2018-11-13 | Stop reason: HOSPADM

## 2018-11-12 RX ORDER — SERTRALINE HYDROCHLORIDE 100 MG/1
100 TABLET, FILM COATED ORAL
Status: DISCONTINUED | OUTPATIENT
Start: 2018-11-13 | End: 2018-11-12

## 2018-11-12 RX ORDER — PREDNISONE 5 MG/1
5 TABLET ORAL DAILY
Status: DISCONTINUED | OUTPATIENT
Start: 2018-11-13 | End: 2018-11-13 | Stop reason: HOSPADM

## 2018-11-12 RX ORDER — OXYCODONE HCL 5 MG/5 ML
5 SOLUTION, ORAL ORAL
Status: COMPLETED | OUTPATIENT
Start: 2018-11-12 | End: 2018-11-12

## 2018-11-12 RX ORDER — MORPHINE SULFATE 4 MG/ML
1-5 INJECTION, SOLUTION INTRAMUSCULAR; INTRAVENOUS
Status: DISCONTINUED | OUTPATIENT
Start: 2018-11-12 | End: 2018-11-12

## 2018-11-12 RX ORDER — HYDRALAZINE HYDROCHLORIDE 20 MG/ML
10 INJECTION INTRAMUSCULAR; INTRAVENOUS EVERY 6 HOURS PRN
Status: DISCONTINUED | OUTPATIENT
Start: 2018-11-12 | End: 2018-11-13 | Stop reason: HOSPADM

## 2018-11-12 RX ORDER — MYCOPHENOLATE MOFETIL 250 MG/1
500 CAPSULE ORAL 2 TIMES DAILY
Status: DISCONTINUED | OUTPATIENT
Start: 2018-11-12 | End: 2018-11-13 | Stop reason: HOSPADM

## 2018-11-12 RX ORDER — PRAVASTATIN SODIUM 20 MG
20 TABLET ORAL DAILY
Status: DISCONTINUED | OUTPATIENT
Start: 2018-11-13 | End: 2018-11-13 | Stop reason: HOSPADM

## 2018-11-12 RX ORDER — SODIUM CHLORIDE, SODIUM LACTATE, POTASSIUM CHLORIDE, CALCIUM CHLORIDE 600; 310; 30; 20 MG/100ML; MG/100ML; MG/100ML; MG/100ML
1000 INJECTION, SOLUTION INTRAVENOUS CONTINUOUS
Status: DISCONTINUED | OUTPATIENT
Start: 2018-11-12 | End: 2018-11-13

## 2018-11-12 RX ORDER — LORAZEPAM 2 MG/ML
.5-1 INJECTION INTRAMUSCULAR EVERY 4 HOURS PRN
Status: DISCONTINUED | OUTPATIENT
Start: 2018-11-12 | End: 2018-11-13 | Stop reason: HOSPADM

## 2018-11-12 RX ORDER — HYDROMORPHONE HYDROCHLORIDE 1 MG/ML
0.2 INJECTION, SOLUTION INTRAMUSCULAR; INTRAVENOUS; SUBCUTANEOUS
Status: DISCONTINUED | OUTPATIENT
Start: 2018-11-12 | End: 2018-11-12 | Stop reason: HOSPADM

## 2018-11-12 RX ORDER — OMEPRAZOLE 20 MG/1
40 CAPSULE, DELAYED RELEASE ORAL DAILY
Status: DISCONTINUED | OUTPATIENT
Start: 2018-11-13 | End: 2018-11-13 | Stop reason: HOSPADM

## 2018-11-12 RX ORDER — TADALAFIL 20 MG/1
40 TABLET ORAL
Status: DISCONTINUED | OUTPATIENT
Start: 2018-11-12 | End: 2018-11-13 | Stop reason: HOSPADM

## 2018-11-12 RX ORDER — HYDROMORPHONE HYDROCHLORIDE 1 MG/ML
0.1 INJECTION, SOLUTION INTRAMUSCULAR; INTRAVENOUS; SUBCUTANEOUS
Status: DISCONTINUED | OUTPATIENT
Start: 2018-11-12 | End: 2018-11-12 | Stop reason: HOSPADM

## 2018-11-12 RX ORDER — SODIUM CHLORIDE, SODIUM LACTATE, POTASSIUM CHLORIDE, CALCIUM CHLORIDE 600; 310; 30; 20 MG/100ML; MG/100ML; MG/100ML; MG/100ML
INJECTION, SOLUTION INTRAVENOUS CONTINUOUS
Status: DISCONTINUED | OUTPATIENT
Start: 2018-11-12 | End: 2018-11-12 | Stop reason: HOSPADM

## 2018-11-12 RX ORDER — TACROLIMUS 1 MG/1
1 CAPSULE ORAL 2 TIMES DAILY
Status: DISCONTINUED | OUTPATIENT
Start: 2018-11-12 | End: 2018-11-12

## 2018-11-12 RX ORDER — OXYCODONE HCL 5 MG/5 ML
10 SOLUTION, ORAL ORAL
Status: COMPLETED | OUTPATIENT
Start: 2018-11-12 | End: 2018-11-12

## 2018-11-12 RX ORDER — SERTRALINE HYDROCHLORIDE 100 MG/1
100 TABLET, FILM COATED ORAL
Status: DISCONTINUED | OUTPATIENT
Start: 2018-11-12 | End: 2018-11-13 | Stop reason: HOSPADM

## 2018-11-12 RX ORDER — FUROSEMIDE 20 MG/1
20 TABLET ORAL DAILY
Status: DISCONTINUED | OUTPATIENT
Start: 2018-11-12 | End: 2018-11-13 | Stop reason: HOSPADM

## 2018-11-12 RX ORDER — DOCUSATE SODIUM 100 MG/1
100 CAPSULE, LIQUID FILLED ORAL 3 TIMES DAILY
Status: DISCONTINUED | OUTPATIENT
Start: 2018-11-12 | End: 2018-11-13 | Stop reason: HOSPADM

## 2018-11-12 RX ORDER — SODIUM CHLORIDE 9 MG/ML
INJECTION, SOLUTION INTRAVENOUS ONCE
Status: DISCONTINUED | OUTPATIENT
Start: 2018-11-12 | End: 2018-11-12 | Stop reason: HOSPADM

## 2018-11-12 RX ADMIN — HYDROMORPHONE HYDROCHLORIDE 0.4 MG: 1 INJECTION, SOLUTION INTRAMUSCULAR; INTRAVENOUS; SUBCUTANEOUS at 17:10

## 2018-11-12 RX ADMIN — HYDROMORPHONE HYDROCHLORIDE 0.2 MG: 1 INJECTION, SOLUTION INTRAMUSCULAR; INTRAVENOUS; SUBCUTANEOUS at 17:20

## 2018-11-12 RX ADMIN — LORAZEPAM 1 MG: 2 INJECTION INTRAMUSCULAR; INTRAVENOUS at 19:08

## 2018-11-12 RX ADMIN — HYDROMORPHONE HYDROCHLORIDE 0.4 MG: 1 INJECTION, SOLUTION INTRAMUSCULAR; INTRAVENOUS; SUBCUTANEOUS at 17:35

## 2018-11-12 RX ADMIN — HYDROMORPHONE HYDROCHLORIDE 0.2 MG: 1 INJECTION, SOLUTION INTRAMUSCULAR; INTRAVENOUS; SUBCUTANEOUS at 17:40

## 2018-11-12 RX ADMIN — MYCOPHENOLATE MOFETIL 500 MG: 250 CAPSULE ORAL at 22:37

## 2018-11-12 RX ADMIN — MIDODRINE HYDROCHLORIDE 10 MG: 5 TABLET ORAL at 22:35

## 2018-11-12 RX ADMIN — LACTULOSE 30 ML: 20 SOLUTION ORAL at 22:44

## 2018-11-12 RX ADMIN — OXYCODONE HYDROCHLORIDE 10 MG: 5 SOLUTION ORAL at 17:00

## 2018-11-12 RX ADMIN — MIDAZOLAM 1 MG: 1 INJECTION INTRAMUSCULAR; INTRAVENOUS at 18:00

## 2018-11-12 RX ADMIN — TADALAFIL 20 MG: 20 TABLET ORAL at 21:00

## 2018-11-12 RX ADMIN — ONDANSETRON 4 MG: 2 INJECTION INTRAMUSCULAR; INTRAVENOUS at 21:23

## 2018-11-12 RX ADMIN — MIDAZOLAM 1 MG: 1 INJECTION INTRAMUSCULAR; INTRAVENOUS at 17:05

## 2018-11-12 RX ADMIN — SODIUM CHLORIDE, POTASSIUM CHLORIDE, SODIUM LACTATE AND CALCIUM CHLORIDE 1000 ML: 600; 310; 30; 20 INJECTION, SOLUTION INTRAVENOUS at 22:45

## 2018-11-12 RX ADMIN — HYDROMORPHONE HYDROCHLORIDE 0.4 MG: 1 INJECTION, SOLUTION INTRAMUSCULAR; INTRAVENOUS; SUBCUTANEOUS at 17:30

## 2018-11-12 RX ADMIN — HYDROMORPHONE HYDROCHLORIDE 0.4 MG: 1 INJECTION, SOLUTION INTRAMUSCULAR; INTRAVENOUS; SUBCUTANEOUS at 17:15

## 2018-11-12 RX ADMIN — SERTRALINE 100 MG: 100 TABLET, FILM COATED ORAL at 22:36

## 2018-11-12 RX ADMIN — TACROLIMUS 1.5 MG: 0.5 CAPSULE ORAL at 22:36

## 2018-11-12 RX ADMIN — Medication: at 18:00

## 2018-11-12 RX ADMIN — GABAPENTIN 600 MG: 300 CAPSULE ORAL at 22:35

## 2018-11-12 RX ADMIN — LORAZEPAM 1 MG: 2 INJECTION INTRAMUSCULAR; INTRAVENOUS at 23:08

## 2018-11-12 RX ADMIN — FUROSEMIDE 20 MG: 20 TABLET ORAL at 22:35

## 2018-11-12 RX ADMIN — DOCUSATE SODIUM 100 MG: 100 CAPSULE, LIQUID FILLED ORAL at 22:35

## 2018-11-12 RX ADMIN — FENTANYL CITRATE 50 MCG: 50 INJECTION, SOLUTION INTRAMUSCULAR; INTRAVENOUS at 17:00

## 2018-11-12 RX ADMIN — FENTANYL CITRATE 50 MCG: 50 INJECTION, SOLUTION INTRAMUSCULAR; INTRAVENOUS at 17:05

## 2018-11-12 RX ADMIN — AMBRISENTAN 10 MG: 10 TABLET, FILM COATED ORAL at 17:30

## 2018-11-12 ASSESSMENT — PATIENT HEALTH QUESTIONNAIRE - PHQ9
1. LITTLE INTEREST OR PLEASURE IN DOING THINGS: NOT AT ALL
SUM OF ALL RESPONSES TO PHQ9 QUESTIONS 1 AND 2: 0
2. FEELING DOWN, DEPRESSED, IRRITABLE, OR HOPELESS: NOT AT ALL

## 2018-11-12 ASSESSMENT — COGNITIVE AND FUNCTIONAL STATUS - GENERAL
SUGGESTED CMS G CODE MODIFIER DAILY ACTIVITY: CH
DAILY ACTIVITIY SCORE: 24
MOBILITY SCORE: 24
SUGGESTED CMS G CODE MODIFIER MOBILITY: CH

## 2018-11-12 ASSESSMENT — PAIN SCALES - GENERAL
PAINLEVEL_OUTOF10: 8
PAINLEVEL_OUTOF10: 9
PAINLEVEL_OUTOF10: 7
PAINLEVEL_OUTOF10: 8
PAINLEVEL_OUTOF10: 9
PAINLEVEL_OUTOF10: 10
PAINLEVEL_OUTOF10: 8
PAINLEVEL_OUTOF10: 10
PAINLEVEL_OUTOF10: 8
PAINLEVEL_OUTOF10: 0

## 2018-11-12 ASSESSMENT — COPD QUESTIONNAIRES
DO YOU EVER COUGH UP ANY MUCUS OR PHLEGM?: YES, A FEW DAYS A WEEK OR MONTH
HAVE YOU SMOKED AT LEAST 100 CIGARETTES IN YOUR ENTIRE LIFE: NO/DON'T KNOW
DURING THE PAST 4 WEEKS HOW MUCH DID YOU FEEL SHORT OF BREATH: SOME OF THE TIME
COPD SCREENING SCORE: 4
IN THE PAST 12 MONTHS DO YOU DO LESS THAN YOU USED TO BECAUSE OF YOUR BREATHING PROBLEMS: AGREE

## 2018-11-12 ASSESSMENT — LIFESTYLE VARIABLES
EVER_SMOKED: NEVER
ALCOHOL_USE: NO

## 2018-11-13 VITALS
HEART RATE: 56 BPM | OXYGEN SATURATION: 95 % | BODY MASS INDEX: 20.48 KG/M2 | SYSTOLIC BLOOD PRESSURE: 115 MMHG | DIASTOLIC BLOOD PRESSURE: 56 MMHG | TEMPERATURE: 98.2 F | HEIGHT: 68 IN | WEIGHT: 135.14 LBS | RESPIRATION RATE: 16 BRPM

## 2018-11-13 PROCEDURE — A9270 NON-COVERED ITEM OR SERVICE: HCPCS | Performed by: PHYSICIAN ASSISTANT

## 2018-11-13 PROCEDURE — 700102 HCHG RX REV CODE 250 W/ 637 OVERRIDE(OP): Performed by: PHYSICIAN ASSISTANT

## 2018-11-13 PROCEDURE — 96376 TX/PRO/DX INJ SAME DRUG ADON: CPT

## 2018-11-13 PROCEDURE — A9270 NON-COVERED ITEM OR SERVICE: HCPCS | Performed by: SURGERY

## 2018-11-13 PROCEDURE — 700102 HCHG RX REV CODE 250 W/ 637 OVERRIDE(OP): Performed by: SURGERY

## 2018-11-13 PROCEDURE — 700111 HCHG RX REV CODE 636 W/ 250 OVERRIDE (IP): Performed by: PHYSICIAN ASSISTANT

## 2018-11-13 PROCEDURE — 700112 HCHG RX REV CODE 229: Performed by: PHYSICIAN ASSISTANT

## 2018-11-13 PROCEDURE — G0378 HOSPITAL OBSERVATION PER HR: HCPCS

## 2018-11-13 PROCEDURE — 90686 IIV4 VACC NO PRSV 0.5 ML IM: CPT | Performed by: PHYSICIAN ASSISTANT

## 2018-11-13 PROCEDURE — 90471 IMMUNIZATION ADMIN: CPT

## 2018-11-13 RX ORDER — OXYCODONE HYDROCHLORIDE 5 MG/1
5-10 TABLET ORAL EVERY 4 HOURS PRN
Status: DISCONTINUED | OUTPATIENT
Start: 2018-11-13 | End: 2018-11-13

## 2018-11-13 RX ORDER — TRAZODONE HYDROCHLORIDE 50 MG/1
100 TABLET ORAL
Status: DISCONTINUED | OUTPATIENT
Start: 2018-11-13 | End: 2018-11-13 | Stop reason: HOSPADM

## 2018-11-13 RX ORDER — HYDROMORPHONE HYDROCHLORIDE 2 MG/1
2-4 TABLET ORAL EVERY 4 HOURS PRN
Qty: 30 TAB | Refills: 0 | Status: SHIPPED | OUTPATIENT
Start: 2018-11-13 | End: 2018-11-16

## 2018-11-13 RX ORDER — HYDROMORPHONE HYDROCHLORIDE 2 MG/1
2-4 TABLET ORAL EVERY 4 HOURS PRN
Status: DISCONTINUED | OUTPATIENT
Start: 2018-11-13 | End: 2018-11-13 | Stop reason: HOSPADM

## 2018-11-13 RX ADMIN — MYCOPHENOLATE MOFETIL 500 MG: 250 CAPSULE ORAL at 06:38

## 2018-11-13 RX ADMIN — PREDNISONE 2 MG: 1 TABLET ORAL at 06:39

## 2018-11-13 RX ADMIN — INFLUENZA A VIRUS A/MICHIGAN/45/2015 X-275 (H1N1) ANTIGEN (FORMALDEHYDE INACTIVATED), INFLUENZA A VIRUS A/SINGAPORE/INFIMH-16-0019/2016 IVR-186 (H3N2) ANTIGEN (FORMALDEHYDE INACTIVATED), INFLUENZA B VIRUS B/PHUKET/3073/2013 ANTIGEN (FORMALDEHYDE INACTIVATED), AND INFLUENZA B VIRUS B/MARYLAND/15/2016 BX-69A ANTIGEN (FORMALDEHYDE INACTIVATED) 0.5 ML: 15; 15; 15; 15 INJECTION, SUSPENSION INTRAMUSCULAR at 09:52

## 2018-11-13 RX ADMIN — OMEPRAZOLE 40 MG: 20 CAPSULE, DELAYED RELEASE ORAL at 06:38

## 2018-11-13 RX ADMIN — LORAZEPAM 1 MG: 2 INJECTION INTRAMUSCULAR; INTRAVENOUS at 03:37

## 2018-11-13 RX ADMIN — PREDNISONE 5 MG: 1 TABLET ORAL at 06:38

## 2018-11-13 RX ADMIN — METHYLNALTREXONE BROMIDE 12 MG: 12 INJECTION, SOLUTION SUBCUTANEOUS at 06:39

## 2018-11-13 RX ADMIN — TIOTROPIUM BROMIDE 1 CAPSULE: 18 CAPSULE ORAL; RESPIRATORY (INHALATION) at 06:37

## 2018-11-13 RX ADMIN — TACROLIMUS 1.5 MG: 0.5 CAPSULE ORAL at 06:38

## 2018-11-13 RX ADMIN — HYDROMORPHONE HYDROCHLORIDE 4 MG: 2 TABLET ORAL at 09:01

## 2018-11-13 RX ADMIN — MIDODRINE HYDROCHLORIDE 10 MG: 5 TABLET ORAL at 06:38

## 2018-11-13 RX ADMIN — LEVOTHYROXINE SODIUM 137 MCG: 137 TABLET ORAL at 06:38

## 2018-11-13 RX ADMIN — PRAVASTATIN SODIUM 20 MG: 20 TABLET ORAL at 06:37

## 2018-11-13 RX ADMIN — BISACODYL 10 MG: 10 SUPPOSITORY RECTAL at 06:38

## 2018-11-13 RX ADMIN — DOCUSATE SODIUM 100 MG: 100 CAPSULE, LIQUID FILLED ORAL at 06:38

## 2018-11-13 RX ADMIN — TRAZODONE HYDROCHLORIDE 100 MG: 50 TABLET ORAL at 00:53

## 2018-11-13 RX ADMIN — GABAPENTIN 600 MG: 300 CAPSULE ORAL at 06:38

## 2018-11-13 RX ADMIN — LORAZEPAM 1 MG: 2 INJECTION INTRAMUSCULAR; INTRAVENOUS at 07:37

## 2018-11-13 ASSESSMENT — ENCOUNTER SYMPTOMS
VOMITING: 0
ABDOMINAL PAIN: 1
CHILLS: 0
NAUSEA: 0
FEVER: 0
SHORTNESS OF BREATH: 0

## 2018-11-13 ASSESSMENT — PAIN SCALES - GENERAL
PAINLEVEL_OUTOF10: 5
PAINLEVEL_OUTOF10: 7
PAINLEVEL_OUTOF10: 5

## 2018-11-13 NOTE — PROGRESS NOTES
Surgical Progress Note    Author: Farzad Phillip Date & Time created: 2018   8:20 AM     Interval Events:  S/p Da Lalo robotic repair of multiple epigastric hernias with mesh, 11 cm round Ventralight ST - POD#1; pain controlled well with dilaudid PCA - will switch to dilaudid tabs; using IS; up to bathroom; wants to go home    Review of Systems   Constitutional: Negative for chills and fever.   Respiratory: Negative for shortness of breath.    Gastrointestinal: Positive for abdominal pain. Negative for nausea and vomiting.   Skin: Negative for itching and rash.     Hemodynamics:  Temp (24hrs), Av.1 °C (98.8 °F), Min:36.6 °C (97.8 °F), Max:37.6 °C (99.7 °F)  Temperature: 37.4 °C (99.4 °F)  Pulse  Av.1  Min: 54  Max: 114Heart Rate (Monitored): 60  Blood Pressure: (!) 97/53, NIBP: 140/68     Respiratory:    Respiration: 18, Pulse Oximetry: 95 %        RUL Breath Sounds: Diminished, RML Breath Sounds: Diminished, RLL Breath Sounds: Diminished, MANJINDER Breath Sounds: Diminished, LLL Breath Sounds: Diminished  Neuro:  GCS       Fluids:    Intake/Output Summary (Last 24 hours) at 18 0820  Last data filed at 18 0629   Gross per 24 hour   Intake             1060 ml   Output              900 ml   Net              160 ml     Weight: 61.3 kg (135 lb 2.3 oz)  Current Diet Order   Procedures   • Diet Order Regular     Physical Exam   Constitutional: She is oriented to person, place, and time. No distress.   Cardiovascular: Normal rate.    Pulmonary/Chest: Effort normal. No respiratory distress.   Abdominal: Soft.   Binder in place  Tegaderms c/d/i   Neurological: She is alert and oriented to person, place, and time.   Skin: Skin is warm and dry.   Psychiatric: She has a normal mood and affect.   Nursing note and vitals reviewed.    Labs:  Recent Results (from the past 24 hour(s))   ACCU-CHEK GLUCOSE    Collection Time: 18 12:36 PM   Result Value Ref Range    Glucose - Accu-Ck 103 (H) 65 - 99 mg/dL      Medical Decision Making, by Problem:  There are no active hospital problems to display for this patient.    Plan:  Discharge home when alert, comfortable, ambulatory, and tolerating PO well.  Pt counseled re: diet, activity, home med's, and wound care.  Bariatric regular diet.  May shower over Tegaderms.   Ok to remove Tegaderms on 11/16/18 . May continue to shower once bandages removed, but no baths/soaks x 2 weeks.  No driving for 4-5 days.  No lifting >15 lbs for 3-4 weeks.  F/U with Dr. Ganser in 1-2 weeks    Quality Measures:  Quality-Core Measures   Reviewed items::  Medications reviewed  DVT prophylaxis - mechanical:  SCDs  Ulcer Prophylaxis::  Yes      Discussed patient condition with RN, Patient and Ganser

## 2018-11-13 NOTE — PROGRESS NOTES
Pt discharged with CHARLIE Geiger in W/C. Prescriptions were given x 1. D/C instructions signed and placed in chart. Chart given to U/C. Charge notified. Medications from pts drawer returned to pharmacy. Controlled substance use informed consent signed.     facesheet given per pt request

## 2018-11-13 NOTE — OP REPORT
DATE OF SERVICE:  11/12/2018    PREOPERATIVE DIAGNOSIS:  Epigastric incisional hernia, multiple.    POSTOPERATIVE DIAGNOSIS:  Epigastric incisional hernia, multiple.    PROCEDURE PERFORMED:  Da Lalo robotic repair of multiple epigastric hernias   with mesh, 11 cm round Ventralight ST.    SURGEON:  John H. Ganser, MD    ASSISTANT:  Farzad Phillip PA-C    ANESTHESIA:  General.    ANESTHESIOLOGIST:  Yuri Mendoza MD    INDICATIONS:  The patient is a 58-year-old female who has had multiple prior   abdominal operations including liver transplant.  She has developed multiple   defects in the epigastric region that are painful.  Risks, benefits, and   alternatives to robotic repair with mesh were outlined in detail.  All   questions were answered and she wished to proceed.    DESCRIPTION OF PROCEDURE:  The patient was identified and general anesthetic   administered.  Her abdomen was prepped and draped in the usual sterile   fashion.  Local anesthesia of 0.5% Marcaine with epinephrine was injected   prior to making skin incision.  Prior scar in the left lateral subcostal   region was reopened and Veress needle passed.  The abdomen was insufflated   with carbon dioxide without incident and a 12 mm blunt trocar and the robotic   camera inserted.  Two additional left abdominal 8 mm robotic trocars were   placed at prior incisions.  There were adhesions to the upper midline with   obvious multiple hernia defects present.  The robot was docked and instruments   inserted.  Cautery scissors were used to take down omental adhesions over the   midline.  Above that, the liver was adherent to the abdominal wall and this   was left alone.  There was wound defect just below the xiphoid and several   other between that and the umbilicus.  There was evidence of a prior mesh   repair around the umbilicus that appeared intact.  Starting at the mesh below   and running upwards, the fascia was imbricated under low insufflation pressure    with running 2-0 Stratafix suture.  Starting in the epigastric region above   the uppermost hernia defect, the fascia was similarly closed with a second   running Stratafix suture.  These were drawn tight, but again under low   abdominal insufflation pressure.  The sutures were then run overlapping each   other for several centimeters in both directions, the needles cut and removed   from the abdomen.  An 11 cm round Ventralight ST mesh was then soaked in   saline and inserted in the abdomen.  This was then secured circumferentially   with running 3-0 Stratafix sutures being sure the mesh was lying nice and   flat, covering the defect nicely and tying it into the mesh around the   umbilical area.  Hemostasis was assured.  The robot was undocked and the   needles and instruments removed and the abdomen deflated.  The mesh stayed   nice and flat as the abdomen was deflated.  Trocar sites were closed with 4-0   Vicryl subcuticular sutures and the fascia at the 12 mm trocar site closed   with Vicryl as well.  Sterile dressings were applied.  The patient returned to   recovery room in stable condition.       ____________________________________     JOHN H. GANSER, MD [facility] / [facility]    DD:  11/12/2018 16:43:00  DT:  11/12/2018 17:09:23    D#:  [id number]  Job#:  [id number]    cc: GABBY MANCERA MD

## 2018-11-13 NOTE — DISCHARGE INSTRUCTIONS
Discharge Instructions    Discharged to home by car with relative. Discharged via wheelchair, hospital escort: Yes.  Special equipment needed: Not Applicable    Be sure to schedule a follow-up appointment with your primary care doctor or any specialists as instructed.     Discharge Plan:   Influenza Vaccine Indication: Patient Refuses    I understand that a diet low in cholesterol, fat, and sodium is recommended for good health. Unless I have been given specific instructions below for another diet, I accept this instruction as my diet prescription.   Other diet: per MD order    Special Instructions: None    · Is patient discharged on Warfarin / Coumadin?   No     Depression / Suicide Risk    As you are discharged from this [facility] include 234 beds at the [facility] facility, it is important to learn how to keep safe from harming yourself.    Recognize the warning signs:  · Abrupt changes in personality, positive or negative- including increase in energy   · Giving away possessions  · Change in eating patterns- significant weight changes-  positive or negative  · Change in sleeping patterns- unable to sleep or sleeping all the time   · Unwillingness or inability to communicate  · Depression  · Unusual sadness, discouragement and loneliness  · Talk of wanting to die  · Neglect of personal appearance   · Rebelliousness- reckless behavior  · Withdrawal from people/activities they love  · Confusion- inability to concentrate     If you or a loved one observes any of these behaviors or has concerns about self-harm, here's what you can do:  · Talk about it- your feelings and reasons for harming yourself  · Remove any means that you might use to hurt yourself (examples: pills, rope, extension cords, firearm)  · Get professional help from the community (Mental Health, Substance Abuse, psychological counseling)  · Do not be alone:Call your Safe Contact- someone whom you trust who will be there for you.  · Call your local CRISIS HOTLINE 911-7950 or  825.416.7115  · Call your local Children's Mobile Crisis Response Team [place] (039) 622-0343 or www.DCMobility  · Call the toll free National Suicide Prevention Hotlines   · National Suicide Prevention Lifeline 050-358-FLPU (8732)  · National Hope Line Network 800-SUICIDE (076-3039)        Laparoscopic Ventral Hernia Repair, Care After  Refer to this sheet in the next few weeks. These instructions provide you with information on caring for yourself after your procedure. Your caregiver may also give you more specific instructions. Your treatment has been planned according to current medical practices, but problems sometimes occur. Call your caregiver if you have any problems or questions after your procedure.   HOME CARE INSTRUCTIONS   · Only take over-the-counter or prescription medicines as directed by your caregiver. If antibiotic medicines are prescribed, take them as directed. Finish them even if you start to feel better.  · Always wash your hands before touching your abdomen.  · Take your bandages (dressings) off after 48 hours or as directed by your caregiver. You may have skin adhesive strips or skin glue over the surgical cuts (incisions). Do not take the strips off or peel the glue off. These will fall off on their own.  · Take showers once your caregiver approves. Until then, only take sponge baths. Do not take tub baths or go swimming until your caregiver approves.  · Check your incision area every day for swelling, redness, warmth, and blood or fluid leaking from the incision. These are signs of infection. Wash your hands before you check.  · Hold a pillow over your abdomen when you cough or sneeze to help ease pain.  · Eat foods that are high in fiber, such as whole grains, fruits, and vegetables. Fiber helps prevent difficult bowel movements (constipation).  · Drink enough fluids to keep your urine clear or pale yellow.  · Rest and lessen activity for 4-5 days after the surgery. You may  take short walks if your caregiver approves. Do not drive until approved by your caregiver.  · Do not lift anything heavy, participate in sports, or have sexual intercourse for 6-8 weeks or until your caregiver approves.    · Ask your caregiver when you can return to work.  · Keep all follow-up appointments.  SEEK MEDICAL CARE IF:   · You have pain even after taking pain medicine.  · You have not had a bowel movement in 3 days.  · You have cramps or are nauseous.  SEEK IMMEDIATE MEDICAL CARE IF:   · You have pain or swelling that is getting worse.  · You have redness around your incisions.  · Your incision is pulling apart.  · You have blood or fluid leaking from your incisions.  · You are vomiting.  · You cannot pass urine.  MAKE SURE YOU:   · Understand these instructions.  · Will watch your condition.  · Will get help right away if you are not doing well or get worse.     This information is not intended to replace advice given to you by your health care provider. Make sure you discuss any questions you have with your health care provider.     Document Released: 12/04/2013 Document Revised: 01/08/2016 Document Reviewed: 12/04/2013  [employer] Interactive Patient Education ©2016 [employer]

## 2018-11-13 NOTE — PROGRESS NOTES
"/60   Pulse (!) 114   Temp 36.6 °C (97.8 °F)   Resp 18   Ht 1.727 m (5' 8\")   Wt 61.3 kg (135 lb 2.3 oz)   LMP 01/03/2000   SpO2 98%   Breastfeeding? No   BMI 20.55 kg/m²     Patient is A&Ox4.   Reports pain relief with dilaudid PCA.   HOPE, CMS intact, reports baseline numbness and tingling.   On 3L O2 NC, denies SOB or chest pain. Educated on IS use.   Hypoactive BS x 4. Tolerating full liquid diet. Reports mild nausea, denies vomiting.   - flatus, - BM. Pt is voiding adequately.   Abdominal incisions clean, dry, and intact. Binder in place.   PIV running IVF  Updated on POC. Belongings and call light within reach. All needs met at this time.     "

## 2018-11-13 NOTE — DISCHARGE INSTR - OTHER INFO
Pt counseled re: diet, activity, home med's, and wound care.  Bariatric regular diet.  May shower over Tegaderms.   Ok to remove Tegaderms on 11/16/18 . May continue to shower once bandages removed, but no baths/soaks x 2 weeks.  No driving for 4-5 days.  No lifting >15 lbs for 3-4 weeks.  F/U with Dr. Ganser in 1-2 weeks

## 2018-11-13 NOTE — CARE PLAN
Problem: Safety  Goal: Will remain free from injury  Outcome: PROGRESSING AS EXPECTED  Safety precautions in place. Bed in locked/low position. 2 side rails up. Treaded socks. Call light in reach, calls appropriately. Hourly rounding practiced.    Problem: Pain Management  Goal: Pain level will decrease to patient's comfort goal  Outcome: PROGRESSING AS EXPECTED  Dilaudid PCA in place

## 2018-11-13 NOTE — OR SURGEON
Immediate Post OP Note    PreOp Diagnosis: Multiple epigastric incisional hernias    PostOp Diagnosis: Same    Procedure(s):  VENTRAL HERNIA REPAIR ROBOTIC XI- FOR EPIGASTRIC INCISIONAL - Wound Class: Clean    Surgeon(s):  John H Ganser, M.D.    Anesthesiologist/Type of Anesthesia:  Anesthesiologist: Yuri Mendoza M.D./General    Surgical Staff:  Circulator: Masha Philippe R.N.; Reinaldo Avilez R.N.  Relief Scrub: Concetta Cruz  Scrub Person: Nirmal Jacob C.N.A.; Julito Franz  First Assist: VICKEY Foley    Specimens removed if any:  * No specimens in log *    Estimated Blood Loss: 20ml    Findings: Multiple defects    Complications: 0        11/12/2018 4:37 PM John H Ganser, M.D.

## 2018-11-13 NOTE — RESPIRATORY CARE
COPD EDUCATION by COPD CLINICAL EDUCATOR  11/13/2018 at 6:52 AM by Mattie العراقي     Patient reviewed by COPD education team. Patient does not qualify for COPD program.

## 2018-11-13 NOTE — PROGRESS NOTES
Bedside report received.  Assessment complete.  Neuro, respiratory, GI,  WNL  A&O x 4. Patient calls appropriately.  Patient up with no assist. Bed alarm NI.   Patient has 7/10 pain. Medication changed prior to administration per provider, awaiting pharmacy approval for administration.   Denies N&V. Tolerating regular diet.  Skin: Surgical lap sites x2 to abd with abd binder in place.  + void, + flatus  Patient denies SOB.  SCD's in place.  Patient educated on DC POC.  Review plan with of care with patient. Call light and personal belongings with in reach. Hourly rounding in place. All needs met at this time.

## 2018-11-13 NOTE — PROGRESS NOTES
Updated  Otis via his cell phone. He will return to hospital around 19:30. PACU RN will update him again once room is assigned.

## 2018-11-15 ENCOUNTER — APPOINTMENT (OUTPATIENT)
Dept: RADIOLOGY | Facility: MEDICAL CENTER | Age: 58
End: 2018-11-15
Attending: EMERGENCY MEDICINE
Payer: MEDICARE

## 2018-11-15 ENCOUNTER — HOSPITAL ENCOUNTER (EMERGENCY)
Facility: MEDICAL CENTER | Age: 58
End: 2018-11-15
Attending: EMERGENCY MEDICINE
Payer: MEDICARE

## 2018-11-15 VITALS
SYSTOLIC BLOOD PRESSURE: 138 MMHG | BODY MASS INDEX: 20.46 KG/M2 | TEMPERATURE: 99 F | OXYGEN SATURATION: 96 % | WEIGHT: 135 LBS | HEART RATE: 69 BPM | RESPIRATION RATE: 18 BRPM | DIASTOLIC BLOOD PRESSURE: 63 MMHG | HEIGHT: 68 IN

## 2018-11-15 DIAGNOSIS — D61.818 PANCYTOPENIA (HCC): ICD-10-CM

## 2018-11-15 DIAGNOSIS — G89.18 POST-OP PAIN: ICD-10-CM

## 2018-11-15 DIAGNOSIS — K56.7 ILEUS FOLLOWING GASTROINTESTINAL SURGERY (HCC): ICD-10-CM

## 2018-11-15 DIAGNOSIS — R10.84 GENERALIZED ABDOMINAL PAIN: ICD-10-CM

## 2018-11-15 DIAGNOSIS — K91.89 ILEUS FOLLOWING GASTROINTESTINAL SURGERY (HCC): ICD-10-CM

## 2018-11-15 DIAGNOSIS — J90 BILATERAL PLEURAL EFFUSION: ICD-10-CM

## 2018-11-15 DIAGNOSIS — J98.11 ATELECTASIS: ICD-10-CM

## 2018-11-15 LAB
ALBUMIN SERPL BCP-MCNC: 3.1 G/DL (ref 3.2–4.9)
ALBUMIN/GLOB SERPL: 1.9 G/DL
ALP SERPL-CCNC: 26 U/L (ref 30–99)
ALT SERPL-CCNC: 15 U/L (ref 2–50)
ANION GAP SERPL CALC-SCNC: 8 MMOL/L (ref 0–11.9)
APPEARANCE UR: CLEAR
APTT PPP: 30.9 SEC (ref 24.7–36)
AST SERPL-CCNC: 21 U/L (ref 12–45)
BASOPHILS # BLD AUTO: 0.5 % (ref 0–1.8)
BASOPHILS # BLD: 0.01 K/UL (ref 0–0.12)
BILIRUB SERPL-MCNC: 0.3 MG/DL (ref 0.1–1.5)
BILIRUB UR QL STRIP.AUTO: NEGATIVE
BNP SERPL-MCNC: 102 PG/ML (ref 0–100)
BUN SERPL-MCNC: 10 MG/DL (ref 8–22)
CALCIUM SERPL-MCNC: 8.6 MG/DL (ref 8.5–10.5)
CHLORIDE SERPL-SCNC: 109 MMOL/L (ref 96–112)
CO2 SERPL-SCNC: 23 MMOL/L (ref 20–33)
COLOR UR: YELLOW
CREAT SERPL-MCNC: 0.75 MG/DL (ref 0.5–1.4)
EOSINOPHIL # BLD AUTO: 0.18 K/UL (ref 0–0.51)
EOSINOPHIL NFR BLD: 8.8 % (ref 0–6.9)
ERYTHROCYTE [DISTWIDTH] IN BLOOD BY AUTOMATED COUNT: 50.4 FL (ref 35.9–50)
GLOBULIN SER CALC-MCNC: 1.6 G/DL (ref 1.9–3.5)
GLUCOSE SERPL-MCNC: 119 MG/DL (ref 65–99)
GLUCOSE UR STRIP.AUTO-MCNC: NEGATIVE MG/DL
HCT VFR BLD AUTO: 33.8 % (ref 37–47)
HGB BLD-MCNC: 10.7 G/DL (ref 12–16)
IMM GRANULOCYTES # BLD AUTO: 0.01 K/UL (ref 0–0.11)
IMM GRANULOCYTES NFR BLD AUTO: 0.5 % (ref 0–0.9)
INR PPP: 1.01 (ref 0.87–1.13)
KETONES UR STRIP.AUTO-MCNC: NEGATIVE MG/DL
LACTATE BLD-SCNC: 0.6 MMOL/L (ref 0.5–2)
LEUKOCYTE ESTERASE UR QL STRIP.AUTO: NEGATIVE
LIPASE SERPL-CCNC: 16 U/L (ref 11–82)
LYMPHOCYTES # BLD AUTO: 0.37 K/UL (ref 1–4.8)
LYMPHOCYTES NFR BLD: 18 % (ref 22–41)
MCH RBC QN AUTO: 30.1 PG (ref 27–33)
MCHC RBC AUTO-ENTMCNC: 31.7 G/DL (ref 33.6–35)
MCV RBC AUTO: 95.2 FL (ref 81.4–97.8)
MICRO URNS: NORMAL
MONOCYTES # BLD AUTO: 0.16 K/UL (ref 0–0.85)
MONOCYTES NFR BLD AUTO: 7.8 % (ref 0–13.4)
NEUTROPHILS # BLD AUTO: 1.32 K/UL (ref 2–7.15)
NEUTROPHILS NFR BLD: 64.4 % (ref 44–72)
NITRITE UR QL STRIP.AUTO: NEGATIVE
NRBC # BLD AUTO: 0 K/UL
NRBC BLD-RTO: 0 /100 WBC
PH UR STRIP.AUTO: 6 [PH]
PLATELET # BLD AUTO: 58 K/UL (ref 164–446)
PMV BLD AUTO: 10.2 FL (ref 9–12.9)
POTASSIUM SERPL-SCNC: 3.8 MMOL/L (ref 3.6–5.5)
PROCALCITONIN SERPL-MCNC: 0.06 NG/ML
PROT SERPL-MCNC: 4.7 G/DL (ref 6–8.2)
PROT UR QL STRIP: NEGATIVE MG/DL
PROTHROMBIN TIME: 13.4 SEC (ref 12–14.6)
RBC # BLD AUTO: 3.55 M/UL (ref 4.2–5.4)
RBC UR QL AUTO: NEGATIVE
SODIUM SERPL-SCNC: 140 MMOL/L (ref 135–145)
SP GR UR REFRACTOMETRY: >1.045
TROPONIN I SERPL-MCNC: <0.01 NG/ML (ref 0–0.04)
UROBILINOGEN UR STRIP.AUTO-MCNC: 0.2 MG/DL
WBC # BLD AUTO: 2.2 K/UL (ref 4.8–10.8)

## 2018-11-15 PROCEDURE — 83690 ASSAY OF LIPASE: CPT

## 2018-11-15 PROCEDURE — 700111 HCHG RX REV CODE 636 W/ 250 OVERRIDE (IP): Performed by: EMERGENCY MEDICINE

## 2018-11-15 PROCEDURE — 74177 CT ABD & PELVIS W/CONTRAST: CPT

## 2018-11-15 PROCEDURE — 81003 URINALYSIS AUTO W/O SCOPE: CPT

## 2018-11-15 PROCEDURE — 700117 HCHG RX CONTRAST REV CODE 255: Performed by: EMERGENCY MEDICINE

## 2018-11-15 PROCEDURE — 93005 ELECTROCARDIOGRAM TRACING: CPT | Performed by: EMERGENCY MEDICINE

## 2018-11-15 PROCEDURE — 80053 COMPREHEN METABOLIC PANEL: CPT

## 2018-11-15 PROCEDURE — 84145 PROCALCITONIN (PCT): CPT

## 2018-11-15 PROCEDURE — 85730 THROMBOPLASTIN TIME PARTIAL: CPT

## 2018-11-15 PROCEDURE — 83605 ASSAY OF LACTIC ACID: CPT

## 2018-11-15 PROCEDURE — 83880 ASSAY OF NATRIURETIC PEPTIDE: CPT

## 2018-11-15 PROCEDURE — 71045 X-RAY EXAM CHEST 1 VIEW: CPT

## 2018-11-15 PROCEDURE — 85610 PROTHROMBIN TIME: CPT

## 2018-11-15 PROCEDURE — 85025 COMPLETE CBC W/AUTO DIFF WBC: CPT

## 2018-11-15 PROCEDURE — 87040 BLOOD CULTURE FOR BACTERIA: CPT

## 2018-11-15 PROCEDURE — 96374 THER/PROPH/DIAG INJ IV PUSH: CPT

## 2018-11-15 PROCEDURE — 84484 ASSAY OF TROPONIN QUANT: CPT

## 2018-11-15 PROCEDURE — 99285 EMERGENCY DEPT VISIT HI MDM: CPT

## 2018-11-15 PROCEDURE — 96375 TX/PRO/DX INJ NEW DRUG ADDON: CPT

## 2018-11-15 RX ORDER — LORAZEPAM 2 MG/ML
1 INJECTION INTRAMUSCULAR ONCE
Status: COMPLETED | OUTPATIENT
Start: 2018-11-15 | End: 2018-11-15

## 2018-11-15 RX ADMIN — FENTANYL CITRATE 25 MCG: 50 INJECTION, SOLUTION INTRAMUSCULAR; INTRAVENOUS at 03:11

## 2018-11-15 RX ADMIN — LORAZEPAM 1 MG: 2 INJECTION INTRAMUSCULAR; INTRAVENOUS at 01:05

## 2018-11-15 RX ADMIN — IOHEXOL 100 ML: 350 INJECTION, SOLUTION INTRAVENOUS at 01:30

## 2018-11-15 ASSESSMENT — PAIN DESCRIPTION - DESCRIPTORS: DESCRIPTORS: SHARP

## 2018-11-15 ASSESSMENT — PAIN SCALES - GENERAL
PAINLEVEL_OUTOF10: 8
PAINLEVEL_OUTOF10: 10

## 2018-11-15 NOTE — ED TRIAGE NOTES
Pt presents to the ED with complaints of abdominal pain related to her hernia surgery she just had this past Monday. Pt is complaining of left sided abdominal pain, pt has had nausea but no vomitting. Pt is also complaining about a productive cough she has been having.

## 2018-11-15 NOTE — ED NOTES
Trang from Lab called with critical result of WBC at 2.2. Critical lab result read back to Trang.   Dr. Reynoso notified of critical lab result at 0130.  Critical lab result read back by Dr. Reynoso.

## 2018-11-15 NOTE — ED NOTES
All lines and monitors discontinued. Discharge instructions given, questions answered.    wheelchaired out of ER, escorted by .  Instructed not to drive after taking pain medication and pt verbalizes understanding.

## 2018-11-15 NOTE — ED PROVIDER NOTES
"ED Provider Note    CHIEF COMPLAINT  Chief Complaint   Patient presents with   • Post-Op Pain        [facility]    Primary care provider: Elisa Phillip M.D.   History obtained from: Patient and EMS   History limited by: None     Roxana Cuba is a 58 y.o. female who presents to the ED by EMS complaining of severe diffuse abdominal pain and bloating with pain radiating up to her chest and into her neck.  Patient reports difficulty breathing due to the pain and she also has been coughing.  Patient had epigastric incisional hernia 3 days ago with Dr. Gurrola and reports worsening pain since then.  She had a temperature of 100.8 last night.  She has not had a bowel movement since her surgery but has been passing \"wet farts.\"  She reports nausea without vomiting.  She denies dysuria but has not been urinating very much.  She has been using hydromorphone without improvement of her pain.  She was given morphine by EMS without any improvement.  Patient with history of liver transplant due to cirrhosis in 2011.  She had a sleeve gastrectomy in 2014.    REVIEW OF SYSTEMS  Please see HPI for pertinent positives/negatives.  All other systems reviewed and are negative.     PAST MEDICAL HISTORY  Past Medical History:   Diagnosis Date   • Low back pain 02-17-17    and left foot, 7/10   • H/O Clostridium difficile infection 02-17-17    reports \"16 months ago\". Current stool sample 01-26-17 neg   • Cirrhosis (HCC) December 2011    Status post liver transplant at [facility]   • Jaundice 2009   • Anemia    • Anesthesia     \"takes more to get me under, would rather be intubated\"    • Breath shortness     cont oxygen 5L (Preffered)   • Bronchitis      2016   • Cardiomegaly    • Chronic fatigue and malaise    • CKD (chronic kidney disease) stage 3, GFR 30-59 ml/min ([facility])    • Diabetes (HCC)     reports hx of, resolved w/weight loss. Reports still checking bloodsugars twice daily and using insulin PRN   • Fracture of left foot    • GERD " "(gastroesophageal reflux disease)         • Hemorrhagic disorder (HCC)     \"low platelets\" bruises easily    • Hiatus hernia syndrome    • High cholesterol    • Hypothyroid    • Liver transplanted (HCC)    • Mild aortic regurgitation and aortic valve sclerosis     • On home oxygen therapy     5 liters, Dr. Gaming   • Platelet disorder (HCC)     low platelets   • Pneumonia     aspiration,    • Psychiatric disorder     Mood disorder   • Pulmonary hypertension (HCC)        • S/P cholecystectomy    • Small bowel obstruction (HCC)     01-   • Splenomegaly    • VRE (vancomycin-resistant Enterococci)     02-17-17, pt declines knowledge of this        SURGICAL HISTORY  Past Surgical History:   Procedure Laterality Date   • VENTRAL HERNIA REPAIR ROBOTIC XI N/A 11/12/2018    Procedure: VENTRAL HERNIA REPAIR ROBOTIC XI- FOR EPIGASTRIC INCISIONAL;  Surgeon: John H Ganser, M.D.;  Location: SURGERY [facility];  Service: General   • TURBINATE REDUCTION Bilateral 10/4/2018    Procedure: TURBINATE REDUCTION;  Surgeon: Silviano Erazo M.D.;  Location: SURGERY SAME DAY [facility];  Service: Ent   • NASAL RECONSTRUCTION Bilateral 10/4/2018    Procedure: NASAL RECONSTRUCTION- LATERA IMPLANTS;  Surgeon: Silviano Erazo M.D.;  Location: SURGERY SAME DAY [facility];  Service: Ent   • OTHER  12/11/2017    paniculectomy   • COLONOSCOPY N/A 3/27/2017    Procedure: COLONOSCOPY;  Surgeon: Osman Matt M.D.;  Location: SURGERY SAME DAY [facility]S;  Service:    • GASTROSCOPY N/A 3/27/2017    Procedure: GASTROSCOPY;  Surgeon: Osman Matt M.D.;  Location: SURGERY SAME DAY [facility];  Service:    • BREAST BIOPSY Left 2/21/2017    Procedure: BREAST BIOPSY - WIRE LOCALIZED EXCISONAL ;  Surgeon: Jane Zhao M.D.;  Location: SURGERY SAME DAY [facility];  Service:    • LUNG BIOPSY OPEN  11/2016   • OTHER ABDOMINAL SURGERY      Gasric Sleeve   • BONE MARROW ASPIRATION  3/16/2015    Performed by Freddie MINOR" CLINT Hi at ENDOSCOPY [facility]   • BONE MARROW BIOPSY, NDL/TROCAR  3/16/2015    Performed by Marlena Hi M.D. at ENDOSCOPY [facility]   • RECOVERY  3/31/2014    Performed by Ir-Recovery Surgery at SURGERY [facility]   • RECOVERY  3/24/2014    Performed by Cath-Recovery Surgery at SURGERY SAME DAY [facility] ORS   • RECOVERY  1/21/2014    Performed by Ir-Recovery Surgery at SURGERY SAME DAY [facility] ORS   • BRONCHOSCOPY-ENDO  11/15/2013    Performed by Ha Perez M.D. at ENDOSCOPY [facility]   • RECOVERY  2/27/2013    Performed by Ir-Recovery Surgery at SURGERY SAME DAY [facility] ORS   • BONE MARROW ASPIRATION  12/31/2012    Performed by Josemanuel Real M.D. at ENDOSCOPY [facility]   • BONE MARROW BIOPSY, NDL/TROCAR  12/31/2012    Performed by Josemanuel Real M.D. at ENDOSCOPY [facility]   • GASTROSCOPY  9/28/2012    Performed by Aravind Richards M.D. at SURGERY [facility]   • SIGMOIDOSCOPY FLEX  9/26/2012    Performed by Kristopher Cardoso M.D. at ENDOSCOPY [facility]   • BRONCHOSCOPY-ENDO  6/19/2012    Performed by MARLENA MURILLO at ENDOSCOPY [facility]   • BRONCHOSCOPY-ENDO  5/29/2012    Performed by SUYAPA CAMARENA at ENDOSCOPY [facility]   • OTHER ABDOMINAL SURGERY  December 2011    [place] [facility] [place].   • GASTROSCOPY-ENDO  3/12/2010    Performed by CAMELIA SAMANO at ENDOSCOPY [facility]   • EXAM UNDER ANESTHESIA  11/11/2009    Performed by BIRD ABDI at SURGERY [facility]   • HEMORRHOIDECTOMY  11/11/2009    Performed by BIRD ABDI at SURGERY [facility]   • KELBY BY LAPAROSCOPY  9/19/2009    Performed by BIRD ABDI at SURGERY [facility]   • CARPAL TUNNEL RELEASE          • KELBY BY LAPAROSCOPY     • GASTRIC BYPASS LAPAROSCOPIC     • HERNIA REPAIR      x3   • HYSTERECTOMY, TOTAL ABDOMINAL          • OTHER      Breast reduction   • OTHER      liver transplant   • PB ANESTH,NOSE,SINUS  SURGERY      x4   • TONSILLECTOMY          SOCIAL HISTORY  Social History     Social History Main Topics   • Smoking status: Never Smoker   • Smokeless tobacco: Never Used      Comment: avoid all tobacco products   • Alcohol use No      Comment: 05/2009 quit drinking   • Drug use: No   • Sexual activity: Not on file        FAMILY HISTORY  Family History   Problem Relation Age of Onset   • Other Father         Unknown (dead 10 years)   • Diabetes Father    • Heart Disease Father    • Hypertension Father    • Hyperlipidemia Father    • Stroke Father    • Non-contributory Mother    • Hyperlipidemia Mother    • Alcohol/Drug Mother    • Cancer Paternal Aunt    • Alcohol/Drug Maternal Grandmother    • Alcohol/Drug Maternal Grandfather         CURRENT MEDICATIONS  Home Medications     Reviewed by Gibran Rocha R.N. (Registered Nurse) on 11/15/18 at 0053  Med List Status: Partial   Medication Last Dose Status   ALPRAZolam (XANAX) 1 MG Tab 11/12/2018 Active   ambrisentan (LETAIRIS) 10 MG tablet 11/11/2018 Active   ascorbic acid (ASCORBIC ACID) 500 MG Tab 11/12/2018 Active   aspirin 81 MG tablet 11/5/2018 Active   bisacodyl ([employer] GENTLE LAXATIVE) 10 MG Suppos 11/12/2018 Active   Calcium-Magnesium-Vitamin D (CITRACAL CALCIUM+D PO) 11/11/2018 Active   Carboxymethylcell-Hypromellose (GENTEAL) 0.25-0.3 % Gel 11/11/2018 Active   docusate sodium (COLACE) 100 MG Cap 11/12/2018 Active   ferrous sulfate (FEOSOL) 220 (44 Fe) MG/5ML Elixir 11/11/2018 Active   furosemide (LASIX) 20 MG Tab 11/11/2018 Active   gabapentin (NEURONTIN) 600 MG tablet 11/12/2018 Active   HYDROmorphone (DILAUDID) 2 MG Tab  Active   lactulose 10 GM/15ML Solution 11/11/2018 Active   levothyroxine (SYNTHROID) 137 MCG Tab 11/12/2018 Active   Linaclotide (LINZESS) 290 MCG Cap 11/12/2018 Active   methylnaltrexone (RELISTOR) 12 MG/0.6ML Solution 11/12/2018 Active   midodrine (PROAMATINE) 10 MG tablet 11/11/2018 Active   mycophenolate (CELLCEPT) 250 MG Cap 11/12/2018  "Active   NON SPECIFIED 11/11/2018 Active   omeprazole (PRILOSEC) 40 MG delayed-release capsule 11/12/2018 Active   ondansetron (ZOFRAN ODT) 4 MG TABLET DISPERSIBLE 11/11/2018 Active   polyethylene glycol 3350 (MIRALAX) Powder 11/11/2018 Active   pravastatin (PRAVACHOL) 20 MG Tab 11/12/2018 Active   predniSONE (DELTASONE) 1 MG Tab 11/12/2018 Active   predniSONE (DELTASONE) 5 MG Tab 11/12/2018 Active   Probiotic Product (PROBIOTIC DAILY PO) 11/11/2018 Active   sertraline (ZOLOFT) 100 MG Tab 11/11/2018 Active   tacrolimus (PROGRAF) 0.5 MG Cap 11/12/2018 Active   tacrolimus (PROGRAF) 1 MG Cap 11/12/2018 Active   Tadalafil, PAH, (ADCIRCA) 20 MG Tab 11/11/2018 Active   tiotropium (SPIRIVA HANDIHALER) 18 MCG Cap 11/12/2018 Active   traZODone (DESYREL) 50 MG Tab 11/11/2018 Active   Wheat Dextrin (BENEFIBER) Powder 11/11/2018 Active                 ALLERGIES  Allergies   Allergen Reactions   • Rhubarb Anaphylaxis   • Organic Nitrates      Nitroglycerin products should be avoided with the use of PDE-5 inhibitors as the combination can result in severe hypotension.   • Vancomycin Hcl      Causes red man syndrome when infused to fast     • Acetaminophen      Can not take, hx of liver transplant    • Nsaids      Can not take due to hx of liver transplant    • Other Drug      Any medication that may interact with cyclosporine, tacrolimus, sirolimus, or prograf (due to hx of liver transplant)         PHYSICAL EXAM  VITAL SIGNS: /63   Pulse 69   Temp 37.2 °C (99 °F) (Tympanic)   Resp 18   Ht 1.727 m (5' 8\")   Wt 61.2 kg (135 lb)   LMP 01/03/2000   SpO2 96%   BMI 20.53 kg/m²  @CHRISTINA[[id number]::@     Pulse ox interpretation: 96% I interpret this pulse ox as normal     Cardiac monitor interpretation: Sinus rhythm with heart rate in the 70s as interpreted by me.  The patient presented with abdominal and chest pain and cardiac monitor was ordered to monitor for dysrhythmia.    Constitutional: Well developed, well nourished, " alert and crying, nontoxic appearance    HENT: No external signs of trauma, normocephalic, oropharynx moist and clear, nose normal    Eyes: PERRL, conjunctiva without erythema, no discharge, no icterus    Neck: Soft and supple, trachea midline, no stridor, no tenderness, no LAD, no JVD, good ROM    Cardiovascular: Regular rate and rhythm, no murmurs/rubs/gallops, strong distal pulses and good perfusion    Thorax & Lungs: No respiratory distress, coarse breath sounds bilaterally  Abdomen: Soft, surgical sites appear clean/dry/intact without overt signs of infection, slightly distended abdomen with diffuse tenderness to palpation and guarding, no rebound, normal BS    Back: No CVAT    Extremities: No cyanosis, no edema, no gross deformity, good ROM, no tenderness, intact distal pulses with brisk cap refill    Skin: Warm, dry, no pallor/cyanosis, no rash noted    Lymphatic: No lymphadenopathy noted    Neuro: A/O times 3, no focal deficits noted    Psychiatric: Cooperative, anxious and crying        DIAGNOSTIC STUDIES / PROCEDURES    EKG  12 Lead EKG obtained at 0114 and interpreted by me:   Rate: 68   Rhythm: Sinus rhythm   Ectopy: PVC  Intervals: Normal   Axis: Normal   Q Waves: None   QRS: Normal   ST segments: Minimal ST depression lateral leads  T Waves: Normal    Clinical Impression: Sinus rhythm with PVC and nonspecific minimal ST depression lateral leads  Compared to October 28, 2018 with similar appearance      LABS  All labs reviewed by me.     Results for orders placed or performed during the hospital encounter of 11/15/18   CBC WITH DIFFERENTIAL   Result Value Ref Range    WBC 2.2 (LL) 4.8 - 10.8 K/uL    RBC 3.55 (L) 4.20 - 5.40 M/uL    Hemoglobin 10.7 (L) 12.0 - 16.0 g/dL    Hematocrit 33.8 (L) 37.0 - 47.0 %    MCV 95.2 81.4 - 97.8 fL    MCH 30.1 27.0 - 33.0 pg    MCHC 31.7 (L) 33.6 - 35.0 g/dL    RDW 50.4 (H) 35.9 - 50.0 fL    Platelet Count 58 (L) 164 - 446 K/uL    MPV 10.2 9.0 - 12.9 fL     Neutrophils-Polys 64.40 44.00 - 72.00 %    Lymphocytes 18.00 (L) 22.00 - 41.00 %    Monocytes 7.80 0.00 - 13.40 %    Eosinophils 8.80 (H) 0.00 - 6.90 %    Basophils 0.50 0.00 - 1.80 %    Immature Granulocytes 0.50 0.00 - 0.90 %    Nucleated RBC 0.00 /100 WBC    Neutrophils (Absolute) 1.32 (L) 2.00 - 7.15 K/uL    Lymphs (Absolute) 0.37 (L) 1.00 - 4.80 K/uL    Monos (Absolute) 0.16 0.00 - 0.85 K/uL    Eos (Absolute) 0.18 0.00 - 0.51 K/uL    Baso (Absolute) 0.01 0.00 - 0.12 K/uL    Immature Granulocytes (abs) 0.01 0.00 - 0.11 K/uL    NRBC (Absolute) 0.00 K/uL   COMP METABOLIC PANEL   Result Value Ref Range    Sodium 140 135 - 145 mmol/L    Potassium 3.8 3.6 - 5.5 mmol/L    Chloride 109 96 - 112 mmol/L    Co2 23 20 - 33 mmol/L    Anion Gap 8.0 0.0 - 11.9    Glucose 119 (H) 65 - 99 mg/dL    Bun 10 8 - 22 mg/dL    Creatinine 0.75 0.50 - 1.40 mg/dL    Calcium 8.6 8.5 - 10.5 mg/dL    AST(SGOT) 21 12 - 45 U/L    ALT(SGPT) 15 2 - 50 U/L    Alkaline Phosphatase 26 (L) 30 - 99 U/L    Total Bilirubin 0.3 0.1 - 1.5 mg/dL    Albumin 3.1 (L) 3.2 - 4.9 g/dL    Total Protein 4.7 (L) 6.0 - 8.2 g/dL    Globulin 1.6 (L) 1.9 - 3.5 g/dL    A-G Ratio 1.9 g/dL   LIPASE   Result Value Ref Range    Lipase 16 11 - 82 U/L   TROPONIN   Result Value Ref Range    Troponin I <0.01 0.00 - 0.04 ng/mL   BTYPE NATRIURETIC PEPTIDE   Result Value Ref Range    B Natriuretic Peptide 102 (H) 0 - 100 pg/mL   LACTIC ACID   Result Value Ref Range    Lactic Acid 0.6 0.5 - 2.0 mmol/L   URINALYSIS CULTURE, IF INDICATED   Result Value Ref Range    Micro Urine Req see below     Color Yellow     Character Clear     Ph 6.0 5.0 - 8.0    Glucose Negative Negative mg/dL    Ketones Negative Negative mg/dL    Protein Negative Negative mg/dL    Bilirubin Negative Negative    Urobilinogen, Urine 0.2 Negative    Nitrite Negative Negative    Leukocyte Esterase Negative Negative    Occult Blood Negative Negative   PROTHROMBIN TIME (INR)   Result Value Ref Range    PT 13.4 12.0  - 14.6 sec    INR 1.01 0.87 - 1.13   APTT   Result Value Ref Range    APTT 30.9 24.7 - 36.0 sec   ESTIMATED GFR   Result Value Ref Range    GFR If African American >60 >60 mL/min/1.73 m 2    GFR If Non African American >60 >60 mL/min/1.73 m 2   REFRACTOMETER SG   Result Value Ref Range    Specific Gravity >1.045      *Note: Due to a large number of results and/or encounters for the requested time period, some results have not been displayed. A complete set of results can be found in Results Review.        RADIOLOGY  The radiologist's interpretation of all radiological studies have been reviewed by me.     DX-CHEST-LIMITED (1 VIEW)   Final Result      Bibasilar opacities as discussed above, without significant change compared with 10/28.               INTERPRETING LOCATION: [address], [place], [address]      CT-ABDOMEN-PELVIS WITH   Final Result      1.  Minimal free intraperitoneal fluid and air, consistent with recent surgery with postoperative changes in the left groin region.   2.  Moderate distention of colon and small bowel, consistent with adynamic ileus.   3.  Splenic findings as noted above.   4.  Minimal bilateral lower lobe atelectasis, left greater than right, with tiny bilateral pleural effusions.             COURSE & MEDICAL DECISION MAKING  Nursing notes, VS, PMSFHx reviewed in chart.     Review of past medical records shows the patient had surgery with Dr. Gurrola on November 12, 2018.      Differential diagnoses considered include but are not limited to: Postop pain, pneumonia, atelectasis, sepsis, AMI, bowel perforation/obstruction, ileus, diverticulitis, mesenteric ischemia, pancreatitis, PUD, gastritis, GERD, KS/renal colic, UTI/pyelo, gastroenteritis, colitis, constipation, muscle strain, hernia      0500: D/W Dr. Ganser.  He recommends patient can be discharged home with outpatient follow-up.      History and physical exam as above.  EKG showed sinus rhythm with PVC and nonspecific minimal ST  depression in lateral leads but similar in appearance to previous.  Lab abnormalities appear chronic and consistent with prior results including her pancytopenia.  Chest x-ray and CT abdomen/pelvis with findings as above.  Patient was treated with Ativan and fentanyl without much improvement in her symptoms.  I discussed the findings with Dr. Ganser who feels that patient can be discharged home and follow-up in the office.  I discussed the findings with the patient.  She is noted to be slightly anxious but in no acute distress and nontoxic in appearance.  No signs of acute abdomen on recheck to suggest a surgical process.  She has been hemodynamically stable during her ED stay.  She was encouraged on outpatient follow-up and given return to ED precautions.  She can continue to use her current medication regimen for her pain.  Patient verbalized understanding and agreed with plan of care with no further questions or concerns.      The patient is referred to a primary physician for blood pressure management, diabetic screening, and for all other preventative health concerns.       FINAL IMPRESSION  1. Post-op pain Acute   2. Generalized abdominal pain Acute   3. Atelectasis Active   4. Ileus following gastrointestinal surgery Active   5. Bilateral pleural effusion Active   6. Pancytopenia (HCC) Chronic          DISPOSITION  Patient will be discharged home in stable condition.       FOLLOW UP  Elisa Phillip M.D.  [address]  [address]  [place] [id number] [phone]    Call today      John H Ganser, M.D.  [address]  [address]  [place] [address]  [phone]    Call today      [facility], Emergency Dept  [address] [id number] [phone]    If symptoms worsen         OUTPATIENT MEDICATIONS  Discharge Medication List as of 11/15/2018  5:26 AM             Electronically signed by: Dank Reynoso, 11/15/2018 12:46 AM      Portions of this record were made with voice  recognition software.  Despite my review, spelling/grammar/context errors may still remain.  Interpretation of this chart should be taken in this context.

## 2018-11-18 RX ORDER — GABAPENTIN 600 MG/1
TABLET ORAL
Qty: 90 TAB | Refills: 1 | Status: SHIPPED | OUTPATIENT
Start: 2018-11-18 | End: 2019-01-15 | Stop reason: SDUPTHER

## 2018-11-19 ENCOUNTER — APPOINTMENT (OUTPATIENT)
Dept: RADIOLOGY | Facility: MEDICAL CENTER | Age: 58
DRG: 196 | End: 2018-11-19
Attending: EMERGENCY MEDICINE
Payer: MEDICARE

## 2018-11-19 ENCOUNTER — HOSPITAL ENCOUNTER (INPATIENT)
Facility: MEDICAL CENTER | Age: 58
LOS: 9 days | DRG: 196 | End: 2018-11-28
Attending: EMERGENCY MEDICINE | Admitting: INTERNAL MEDICINE
Payer: MEDICARE

## 2018-11-19 ENCOUNTER — TELEPHONE (OUTPATIENT)
Dept: MEDICAL GROUP | Facility: LAB | Age: 58
End: 2018-11-19

## 2018-11-19 DIAGNOSIS — Z94.4 STATUS POST LIVER TRANSPLANT (HCC): ICD-10-CM

## 2018-11-19 DIAGNOSIS — F41.1 GAD (GENERALIZED ANXIETY DISORDER): ICD-10-CM

## 2018-11-19 PROBLEM — J40 BRONCHITIS: Status: ACTIVE | Noted: 2018-11-19

## 2018-11-19 LAB
ALBUMIN SERPL BCP-MCNC: 3.8 G/DL (ref 3.2–4.9)
ALBUMIN/GLOB SERPL: 1.7 G/DL
ALP SERPL-CCNC: 33 U/L (ref 30–99)
ALT SERPL-CCNC: 15 U/L (ref 2–50)
ANION GAP SERPL CALC-SCNC: 9 MMOL/L (ref 0–11.9)
ANISOCYTOSIS BLD QL SMEAR: ABNORMAL
APPEARANCE UR: CLEAR
AST SERPL-CCNC: 23 U/L (ref 12–45)
BASOPHILS # BLD AUTO: 1.7 % (ref 0–1.8)
BASOPHILS # BLD: 0.05 K/UL (ref 0–0.12)
BILIRUB SERPL-MCNC: 0.4 MG/DL (ref 0.1–1.5)
BILIRUB UR QL STRIP.AUTO: NEGATIVE
BUN SERPL-MCNC: 13 MG/DL (ref 8–22)
CALCIUM SERPL-MCNC: 8.8 MG/DL (ref 8.5–10.5)
CHLORIDE SERPL-SCNC: 106 MMOL/L (ref 96–112)
CO2 SERPL-SCNC: 26 MMOL/L (ref 20–33)
COLOR UR: YELLOW
CORTIS SERPL-MCNC: 2.6 UG/DL (ref 0–23)
CREAT SERPL-MCNC: 0.99 MG/DL (ref 0.5–1.4)
EOSINOPHIL # BLD AUTO: 0.15 K/UL (ref 0–0.51)
EOSINOPHIL NFR BLD: 5.2 % (ref 0–6.9)
ERYTHROCYTE [DISTWIDTH] IN BLOOD BY AUTOMATED COUNT: 47.2 FL (ref 35.9–50)
GLOBULIN SER CALC-MCNC: 2.2 G/DL (ref 1.9–3.5)
GLUCOSE SERPL-MCNC: 123 MG/DL (ref 65–99)
GLUCOSE UR STRIP.AUTO-MCNC: NEGATIVE MG/DL
HCT VFR BLD AUTO: 36.5 % (ref 37–47)
HGB BLD-MCNC: 11.9 G/DL (ref 12–16)
KETONES UR STRIP.AUTO-MCNC: NEGATIVE MG/DL
LACTATE BLD-SCNC: 1 MMOL/L (ref 0.5–2)
LEUKOCYTE ESTERASE UR QL STRIP.AUTO: NEGATIVE
LIPASE SERPL-CCNC: 15 U/L (ref 11–82)
LYMPHOCYTES # BLD AUTO: 1 K/UL (ref 1–4.8)
LYMPHOCYTES NFR BLD: 35.7 % (ref 22–41)
MANUAL DIFF BLD: NORMAL
MCH RBC QN AUTO: 29.4 PG (ref 27–33)
MCHC RBC AUTO-ENTMCNC: 32.6 G/DL (ref 33.6–35)
MCV RBC AUTO: 90.1 FL (ref 81.4–97.8)
MICRO URNS: NORMAL
MICROCYTES BLD QL SMEAR: ABNORMAL
MONOCYTES # BLD AUTO: 0.07 K/UL (ref 0–0.85)
MONOCYTES NFR BLD AUTO: 2.6 % (ref 0–13.4)
MORPHOLOGY BLD-IMP: NORMAL
NEUTROPHILS # BLD AUTO: 1.53 K/UL (ref 2–7.15)
NEUTROPHILS NFR BLD: 53.9 % (ref 44–72)
NEUTS BAND NFR BLD MANUAL: 0.9 % (ref 0–10)
NITRITE UR QL STRIP.AUTO: NEGATIVE
NRBC # BLD AUTO: 0 K/UL
NRBC BLD-RTO: 0 /100 WBC
OVALOCYTES BLD QL SMEAR: NORMAL
PH UR STRIP.AUTO: 5.5 [PH]
PLATELET # BLD AUTO: 86 K/UL (ref 164–446)
PLATELET BLD QL SMEAR: NORMAL
PMV BLD AUTO: 9.5 FL (ref 9–12.9)
POIKILOCYTOSIS BLD QL SMEAR: NORMAL
POTASSIUM SERPL-SCNC: 4.1 MMOL/L (ref 3.6–5.5)
PROT SERPL-MCNC: 6 G/DL (ref 6–8.2)
PROT UR QL STRIP: NEGATIVE MG/DL
RBC # BLD AUTO: 4.05 M/UL (ref 4.2–5.4)
RBC BLD AUTO: PRESENT
RBC UR QL AUTO: NEGATIVE
SCHISTOCYTES BLD QL SMEAR: NORMAL
SODIUM SERPL-SCNC: 141 MMOL/L (ref 135–145)
SP GR UR STRIP.AUTO: 1.01
TROPONIN I SERPL-MCNC: <0.01 NG/ML (ref 0–0.04)
UROBILINOGEN UR STRIP.AUTO-MCNC: 0.2 MG/DL
WBC # BLD AUTO: 2.8 K/UL (ref 4.8–10.8)

## 2018-11-19 PROCEDURE — 71045 X-RAY EXAM CHEST 1 VIEW: CPT

## 2018-11-19 PROCEDURE — 83690 ASSAY OF LIPASE: CPT

## 2018-11-19 PROCEDURE — 99223 1ST HOSP IP/OBS HIGH 75: CPT | Mod: AI | Performed by: INTERNAL MEDICINE

## 2018-11-19 PROCEDURE — 84484 ASSAY OF TROPONIN QUANT: CPT

## 2018-11-19 PROCEDURE — A9270 NON-COVERED ITEM OR SERVICE: HCPCS | Performed by: INTERNAL MEDICINE

## 2018-11-19 PROCEDURE — 700111 HCHG RX REV CODE 636 W/ 250 OVERRIDE (IP): Performed by: EMERGENCY MEDICINE

## 2018-11-19 PROCEDURE — 700102 HCHG RX REV CODE 250 W/ 637 OVERRIDE(OP): Performed by: INTERNAL MEDICINE

## 2018-11-19 PROCEDURE — 83605 ASSAY OF LACTIC ACID: CPT

## 2018-11-19 PROCEDURE — 85027 COMPLETE CBC AUTOMATED: CPT

## 2018-11-19 PROCEDURE — 80053 COMPREHEN METABOLIC PANEL: CPT

## 2018-11-19 PROCEDURE — 99285 EMERGENCY DEPT VISIT HI MDM: CPT

## 2018-11-19 PROCEDURE — 700111 HCHG RX REV CODE 636 W/ 250 OVERRIDE (IP): Performed by: INTERNAL MEDICINE

## 2018-11-19 PROCEDURE — 81003 URINALYSIS AUTO W/O SCOPE: CPT

## 2018-11-19 PROCEDURE — 74176 CT ABD & PELVIS W/O CONTRAST: CPT

## 2018-11-19 PROCEDURE — 87040 BLOOD CULTURE FOR BACTERIA: CPT

## 2018-11-19 PROCEDURE — 96375 TX/PRO/DX INJ NEW DRUG ADDON: CPT

## 2018-11-19 PROCEDURE — 700101 HCHG RX REV CODE 250: Performed by: EMERGENCY MEDICINE

## 2018-11-19 PROCEDURE — 700105 HCHG RX REV CODE 258: Performed by: INTERNAL MEDICINE

## 2018-11-19 PROCEDURE — 82533 TOTAL CORTISOL: CPT

## 2018-11-19 PROCEDURE — 770020 HCHG ROOM/CARE - TELE (206)

## 2018-11-19 PROCEDURE — 700105 HCHG RX REV CODE 258: Performed by: EMERGENCY MEDICINE

## 2018-11-19 PROCEDURE — 85007 BL SMEAR W/DIFF WBC COUNT: CPT

## 2018-11-19 PROCEDURE — 96365 THER/PROPH/DIAG IV INF INIT: CPT

## 2018-11-19 RX ORDER — AMBRISENTAN 10 MG/1
10 TABLET, FILM COATED ORAL DAILY
Status: DISCONTINUED | OUTPATIENT
Start: 2018-11-20 | End: 2018-11-28 | Stop reason: HOSPADM

## 2018-11-19 RX ORDER — POLYETHYLENE GLYCOL 3350 17 G/17G
1 POWDER, FOR SOLUTION ORAL
Status: DISCONTINUED | OUTPATIENT
Start: 2018-11-19 | End: 2018-11-28 | Stop reason: HOSPADM

## 2018-11-19 RX ORDER — AMBRISENTAN 10 MG/1
10 TABLET, FILM COATED ORAL DAILY
Status: DISCONTINUED | OUTPATIENT
Start: 2018-11-20 | End: 2018-11-19

## 2018-11-19 RX ORDER — OXYCODONE HYDROCHLORIDE 10 MG/1
10 TABLET ORAL 3 TIMES DAILY
COMMUNITY
End: 2019-01-30

## 2018-11-19 RX ORDER — OMEPRAZOLE 20 MG/1
40 CAPSULE, DELAYED RELEASE ORAL DAILY
Status: DISCONTINUED | OUTPATIENT
Start: 2018-11-20 | End: 2018-11-28 | Stop reason: HOSPADM

## 2018-11-19 RX ORDER — CALCIUM POLYCARBOPHIL 625 MG 625 MG/1
625 TABLET ORAL EVERY MORNING
Status: ON HOLD | COMMUNITY
End: 2021-08-03

## 2018-11-19 RX ORDER — SENNOSIDES A AND B 8.6 MG/1
17.2 TABLET, FILM COATED ORAL EVERY 12 HOURS
Status: ON HOLD | COMMUNITY
End: 2021-08-03

## 2018-11-19 RX ORDER — AZITHROMYCIN 250 MG/1
500 TABLET, FILM COATED ORAL DAILY
Status: COMPLETED | OUTPATIENT
Start: 2018-11-19 | End: 2018-11-23

## 2018-11-19 RX ORDER — ASPIRIN 81 MG/1
81 TABLET, CHEWABLE ORAL DAILY
Status: DISCONTINUED | OUTPATIENT
Start: 2018-11-20 | End: 2018-11-28 | Stop reason: HOSPADM

## 2018-11-19 RX ORDER — PRAVASTATIN SODIUM 20 MG
20 TABLET ORAL DAILY
Status: DISCONTINUED | OUTPATIENT
Start: 2018-11-20 | End: 2018-11-28 | Stop reason: HOSPADM

## 2018-11-19 RX ORDER — MORPHINE SULFATE 10 MG/ML
4 INJECTION, SOLUTION INTRAMUSCULAR; INTRAVENOUS ONCE
Status: COMPLETED | OUTPATIENT
Start: 2018-11-19 | End: 2018-11-19

## 2018-11-19 RX ORDER — MIDODRINE HYDROCHLORIDE 5 MG/1
5-10 TABLET ORAL
Status: DISCONTINUED | OUTPATIENT
Start: 2018-11-19 | End: 2018-11-28 | Stop reason: HOSPADM

## 2018-11-19 RX ORDER — SODIUM CHLORIDE 9 MG/ML
INJECTION, SOLUTION INTRAVENOUS CONTINUOUS
Status: DISCONTINUED | OUTPATIENT
Start: 2018-11-19 | End: 2018-11-23

## 2018-11-19 RX ORDER — ONDANSETRON 2 MG/ML
4 INJECTION INTRAMUSCULAR; INTRAVENOUS ONCE
Status: COMPLETED | OUTPATIENT
Start: 2018-11-19 | End: 2018-11-19

## 2018-11-19 RX ORDER — HYDROMORPHONE HYDROCHLORIDE 1 MG/ML
0.5 INJECTION, SOLUTION INTRAMUSCULAR; INTRAVENOUS; SUBCUTANEOUS ONCE
Status: COMPLETED | OUTPATIENT
Start: 2018-11-19 | End: 2018-11-19

## 2018-11-19 RX ORDER — GABAPENTIN 300 MG/1
600 CAPSULE ORAL 3 TIMES DAILY
Status: DISCONTINUED | OUTPATIENT
Start: 2018-11-19 | End: 2018-11-28 | Stop reason: HOSPADM

## 2018-11-19 RX ORDER — TRAZODONE HYDROCHLORIDE 100 MG/1
100 TABLET ORAL
Status: DISCONTINUED | OUTPATIENT
Start: 2018-11-19 | End: 2018-11-28 | Stop reason: HOSPADM

## 2018-11-19 RX ORDER — MIDODRINE HYDROCHLORIDE 5 MG/1
5-15 TABLET ORAL
COMMUNITY
End: 2019-05-01

## 2018-11-19 RX ORDER — SERTRALINE HYDROCHLORIDE 100 MG/1
100 TABLET, FILM COATED ORAL
Status: DISCONTINUED | OUTPATIENT
Start: 2018-11-19 | End: 2018-11-28 | Stop reason: HOSPADM

## 2018-11-19 RX ORDER — AMOXICILLIN 250 MG
2 CAPSULE ORAL 2 TIMES DAILY
Status: DISCONTINUED | OUTPATIENT
Start: 2018-11-19 | End: 2018-11-28 | Stop reason: HOSPADM

## 2018-11-19 RX ORDER — TACROLIMUS 1 MG/1
1 CAPSULE ORAL 2 TIMES DAILY
Status: DISCONTINUED | OUTPATIENT
Start: 2018-11-19 | End: 2018-11-19

## 2018-11-19 RX ORDER — BISACODYL 10 MG
10 SUPPOSITORY, RECTAL RECTAL
Status: DISCONTINUED | OUTPATIENT
Start: 2018-11-19 | End: 2018-11-28 | Stop reason: HOSPADM

## 2018-11-19 RX ORDER — SODIUM CHLORIDE 9 MG/ML
1000 INJECTION, SOLUTION INTRAVENOUS ONCE
Status: COMPLETED | OUTPATIENT
Start: 2018-11-19 | End: 2018-11-19

## 2018-11-19 RX ORDER — LEVOTHYROXINE SODIUM 137 UG/1
137 TABLET ORAL
Status: DISCONTINUED | OUTPATIENT
Start: 2018-11-20 | End: 2018-11-28 | Stop reason: HOSPADM

## 2018-11-19 RX ORDER — MYCOPHENOLATE MOFETIL 250 MG/1
500 CAPSULE ORAL 2 TIMES DAILY
Status: DISCONTINUED | OUTPATIENT
Start: 2018-11-19 | End: 2018-11-22

## 2018-11-19 RX ORDER — LORAZEPAM 2 MG/ML
1 INJECTION INTRAMUSCULAR ONCE
Status: COMPLETED | OUTPATIENT
Start: 2018-11-19 | End: 2018-11-19

## 2018-11-19 RX ORDER — ALPRAZOLAM 1 MG/1
1 TABLET ORAL 3 TIMES DAILY
Status: DISCONTINUED | OUTPATIENT
Start: 2018-11-19 | End: 2018-11-22

## 2018-11-19 RX ORDER — MORPHINE SULFATE 10 MG/ML
2 INJECTION, SOLUTION INTRAMUSCULAR; INTRAVENOUS EVERY 4 HOURS PRN
Status: DISCONTINUED | OUTPATIENT
Start: 2018-11-19 | End: 2018-11-20

## 2018-11-19 RX ORDER — FUROSEMIDE 40 MG/1
20 TABLET ORAL EVERY MORNING
COMMUNITY
End: 2018-12-03 | Stop reason: SDUPTHER

## 2018-11-19 RX ORDER — GUAIFENESIN/DEXTROMETHORPHAN 100-10MG/5
5 SYRUP ORAL EVERY 6 HOURS PRN
Status: DISCONTINUED | OUTPATIENT
Start: 2018-11-19 | End: 2018-11-28 | Stop reason: HOSPADM

## 2018-11-19 RX ADMIN — SODIUM CHLORIDE: 9 INJECTION, SOLUTION INTRAVENOUS at 22:34

## 2018-11-19 RX ADMIN — PIPERACILLIN AND TAZOBACTAM 4.5 G: 4; .5 INJECTION, POWDER, LYOPHILIZED, FOR SOLUTION INTRAVENOUS; PARENTERAL at 16:41

## 2018-11-19 RX ADMIN — LORAZEPAM 1 MG: 2 INJECTION INTRAMUSCULAR; INTRAVENOUS at 16:45

## 2018-11-19 RX ADMIN — MORPHINE SULFATE 4 MG: 10 INJECTION INTRAVENOUS at 17:28

## 2018-11-19 RX ADMIN — ONDANSETRON 4 MG: 2 INJECTION INTRAMUSCULAR; INTRAVENOUS at 15:14

## 2018-11-19 RX ADMIN — SODIUM CHLORIDE 1000 ML: 9 INJECTION, SOLUTION INTRAVENOUS at 15:14

## 2018-11-19 RX ADMIN — MYCOPHENOLATE MOFETIL 500 MG: 250 CAPSULE ORAL at 22:32

## 2018-11-19 RX ADMIN — HYDROMORPHONE HYDROCHLORIDE 0.5 MG: 1 INJECTION, SOLUTION INTRAMUSCULAR; INTRAVENOUS; SUBCUTANEOUS at 15:13

## 2018-11-19 RX ADMIN — ALPRAZOLAM 1 MG: 1 TABLET ORAL at 21:54

## 2018-11-19 RX ADMIN — GABAPENTIN 600 MG: 300 CAPSULE ORAL at 21:53

## 2018-11-19 RX ADMIN — MIDODRINE HYDROCHLORIDE 10 MG: 5 TABLET ORAL at 23:51

## 2018-11-19 RX ADMIN — CEFTRIAXONE SODIUM 2 G: 2 INJECTION, POWDER, FOR SOLUTION INTRAMUSCULAR; INTRAVENOUS at 22:20

## 2018-11-19 RX ADMIN — AZITHROMYCIN 500 MG: 250 TABLET, FILM COATED ORAL at 22:07

## 2018-11-19 RX ADMIN — KETAMINE HYDROCHLORIDE 25 MG: 10 INJECTION, SOLUTION INTRAMUSCULAR; INTRAVENOUS at 16:03

## 2018-11-19 RX ADMIN — TRAZODONE HYDROCHLORIDE 100 MG: 100 TABLET ORAL at 22:07

## 2018-11-19 RX ADMIN — TACROLIMUS 1.5 MG: 1 CAPSULE ORAL at 22:32

## 2018-11-19 RX ADMIN — SERTRALINE 100 MG: 100 TABLET, FILM COATED ORAL at 21:53

## 2018-11-19 RX ADMIN — STANDARDIZED SENNA CONCENTRATE AND DOCUSATE SODIUM 2 TABLET: 8.6; 5 TABLET, FILM COATED ORAL at 21:53

## 2018-11-19 ASSESSMENT — ENCOUNTER SYMPTOMS
PALPITATIONS: 0
WEIGHT LOSS: 0
EYE REDNESS: 0
MYALGIAS: 0
HEADACHES: 0
HEARTBURN: 0
EYE DISCHARGE: 0
NAUSEA: 0
NECK PAIN: 0
VOMITING: 0
BLURRED VISION: 0
BACK PAIN: 0
CHILLS: 1
SEIZURES: 0
DEPRESSION: 0
SPUTUM PRODUCTION: 1
DIZZINESS: 0
NERVOUS/ANXIOUS: 0
COUGH: 1
FOCAL WEAKNESS: 0
SHORTNESS OF BREATH: 1
ABDOMINAL PAIN: 1
ORTHOPNEA: 0
FEVER: 1
DIARRHEA: 0
INSOMNIA: 0
STRIDOR: 0
EYE PAIN: 0

## 2018-11-19 ASSESSMENT — PATIENT HEALTH QUESTIONNAIRE - PHQ9
2. FEELING DOWN, DEPRESSED, IRRITABLE, OR HOPELESS: NOT AT ALL
SUM OF ALL RESPONSES TO PHQ9 QUESTIONS 1 AND 2: 0
1. LITTLE INTEREST OR PLEASURE IN DOING THINGS: NOT AT ALL

## 2018-11-19 ASSESSMENT — COGNITIVE AND FUNCTIONAL STATUS - GENERAL
SUGGESTED CMS G CODE MODIFIER DAILY ACTIVITY: CH
SUGGESTED CMS G CODE MODIFIER MOBILITY: CH
DAILY ACTIVITIY SCORE: 24
MOBILITY SCORE: 24

## 2018-11-19 ASSESSMENT — COPD QUESTIONNAIRES
DO YOU EVER COUGH UP ANY MUCUS OR PHLEGM?: YES, A FEW DAYS A WEEK OR MONTH
DURING THE PAST 4 WEEKS HOW MUCH DID YOU FEEL SHORT OF BREATH: MOST  OR ALL OF THE TIME
COPD SCREENING SCORE: 5
HAVE YOU SMOKED AT LEAST 100 CIGARETTES IN YOUR ENTIRE LIFE: NO/DON'T KNOW

## 2018-11-19 ASSESSMENT — PAIN SCALES - GENERAL
PAINLEVEL_OUTOF10: 9
PAINLEVEL_OUTOF10: 8

## 2018-11-19 ASSESSMENT — LIFESTYLE VARIABLES: EVER_SMOKED: NEVER

## 2018-11-19 NOTE — TELEPHONE ENCOUNTER
PVP WITH OUTREACH  Future Appointments       Provider Department [place]    11/27/2018 9:00 AM Elisa Phillip M.D.; [facility]  [facility]     12/4/2018 9:00 AM Elisa Phillip M.D. [facility]     12/5/2018 8:00 AM LAB [facility] - [facility]     1/7/2019 1:15 PM [facility].D. [facility]     1/9/2019 8:00 AM LAB [facility]     2/13/2019 8:00 AM LAB [facility]     3/14/2019 10:30 AM A Rotation [facility]           ANNUAL WELLNESS VISIT PRE-VISIT PLANNING     1.  Immunizations were updated in Epic using WebIZ?: Epic matches WebIZ       •  WebIZ Recommendations: SHINGRIX (Shingles)       •  Is patient due for Tdap? NO       •  Is patient due for Shingles? YES. Patient was notified of copay/out of pocket cost.     2.  Specialty Comments was updated with diagnosis information provided by [facility]: YES MDX printed

## 2018-11-19 NOTE — ED TRIAGE NOTES
"PT to triage c/o abd pain, lt flank pain, and abd swelling.  PT reports last normal BM \"probably over a week ago, I may have another bowel obstruction, I have pneumonia, I have pain everywhere and the idiot sent me home\".  PT was seen recently for the same.  PT tearful and agitated in triage.    Chief Complaint   Patient presents with   • Flank Pain   • Abdominal Pain   • Abdominal Swelling     Blood pressure (!) 95/56, pulse 70, temperature 36.4 °C (97.6 °F), temperature source Temporal, resp. rate 16, height 1.727 m (5' 8\"), weight 62.1 kg (137 lb), last menstrual period 01/03/2000, SpO2 97 %, not currently breastfeeding.      "

## 2018-11-19 NOTE — ED PROVIDER NOTES
"ED Provider Note    CHIEF COMPLAINT  Chief Complaint   Patient presents with   • Flank Pain   • Abdominal Pain   • Abdominal Swelling       HPI  Roxana Cuba is a 58 y.o. female who presents for evaluation of several complaints including abdominal pain cough not feeling well.  The patient has a complex and extensive medical history including liver transplant at [facility] around 7 years ago.  She is 7 days status post incisional hernia repair with Dr. Ganser, she had a robotic assisted epigastric hernia repair.  She was seen a few days later diagnosed with an ileus likely atelectasis.  She reports having ongoing pain and inability to keep food down abdominal distention she is passing gas but has not had a bowel movement.  She also has subjective chills reported fevers    REVIEW OF SYSTEMS  See HPI for further details.  No night sweats weight loss numbness tingling weakness rash all other systems are negative.     PAST MEDICAL HISTORY  Past Medical History:   Diagnosis Date   • Low back pain 02-17-17    and left foot, 7/10   • H/O Clostridium difficile infection 02-17-17    reports \"16 months ago\". Current stool sample 01-26-17 neg   • Cirrhosis (HCC) December 2011    Status [place] [facility] [place]   • Jaundice 2009   • Anemia    • Anesthesia     \"takes more to get me under, would rather be intubated\"    • Breath shortness     cont oxygen 5L (Preffered)   • Bronchitis      2016   • Cardiomegaly    • Chronic fatigue and malaise    • CKD (chronic kidney disease) stage 3, GFR 30-59 ml/min ([facility])    • Diabetes ([facility])     reports hx of, resolved w/weight loss. Reports still checking bloodsugars twice daily and using insulin PRN   • Fracture of left foot    • GERD (gastroesophageal reflux disease)         • Hemorrhagic disorder ([facility])     \"low platelets\" bruises easily    • Hiatus hernia syndrome    • High cholesterol    • Hypothyroid    • Liver transplanted ([facility])    • Mild aortic regurgitation and aortic valve " sclerosis     • On home oxygen therapy     5 liters, Dr. Gaming   • Platelet disorder (HCC)     low platelets   • Pneumonia     aspiration,    • Psychiatric disorder     Mood disorder   • Pulmonary hypertension ([facility])        • S/P cholecystectomy    • Small bowel obstruction (HCC)     01-   • Splenomegaly    • VRE (vancomycin-resistant Enterococci)     02-17-17, pt declines knowledge of this       FAMILY HISTORY  Noncontributory    SOCIAL HISTORY  Social History     Social History   • Marital status:      Spouse name: N/A   • Number of children: N/A   • Years of education: N/A     Social History Main Topics   • Smoking status: Never Smoker   • Smokeless tobacco: Never Used      Comment: avoid all tobacco products   • Alcohol use No      Comment: 05/2009 quit drinking   • Drug use: No   • Sexual activity: Not on file     Other Topics Concern   • Not on file     Social History Narrative   • No narrative on file       SURGICAL HISTORY  Past Surgical History:   Procedure Laterality Date   • VENTRAL HERNIA REPAIR ROBOTIC XI N/A 11/12/2018    Procedure: VENTRAL HERNIA REPAIR ROBOTIC XI- FOR EPIGASTRIC INCISIONAL;  Surgeon: John H Ganser, M.D.;  Location: SURGERY [facility];  Service: General   • TURBINATE REDUCTION Bilateral 10/4/2018    Procedure: TURBINATE REDUCTION;  Surgeon: Silviano Erazo M.D.;  Location: SURGERY SAME DAY [facility]S;  Service: Ent   • NASAL RECONSTRUCTION Bilateral 10/4/2018    Procedure: NASAL RECONSTRUCTION- LATERA IMPLANTS;  Surgeon: Silviano Erazo M.D.;  Location: SURGERY SAME DAY [facility];  Service: Ent   • OTHER  12/11/2017    paniculectomy   • COLONOSCOPY N/A 3/27/2017    Procedure: COLONOSCOPY;  Surgeon: Osman Matt M.D.;  Location: SURGERY SAME DAY [facility];  Service:    • GASTROSCOPY N/A 3/27/2017    Procedure: GASTROSCOPY;  Surgeon: Osman Matt M.D.;  Location: SURGERY SAME DAY [facility];  Service:    • BREAST BIOPSY Left 2/21/2017     Procedure: BREAST BIOPSY - WIRE LOCALIZED EXCISONAL ;  Surgeon: Jane Zhao M.D.;  Location: SURGERY SAME DAY [facility];  Service:    • LUNG BIOPSY OPEN  11/2016   • OTHER ABDOMINAL SURGERY      Gasric Sleeve   • BONE MARROW ASPIRATION  3/16/2015    Performed by Marlena Hi M.D. at ENDOSCOPY [facility]   • BONE MARROW BIOPSY, NDL/TROCAR  3/16/2015    Performed by Marlena Hi M.D. at ENDOSCOPY [facility]   • RECOVERY  3/31/2014    Performed by Ir[facility]Rec[facility] Surgery at SURGERY [facility]   • RECOVERY  3/24/2014    Performed by [facility]Recovery Surgery at SURGERY SAME DAY [facility]   • RECOVERY  1/21/2014    Performed by [facility] at SURGERY SAME DAY [facility]   • BRONCHOSCOPY-ENDO  11/15/2013    Performed by Ha Perez M.D. at ENDOSCOPY [facility]   • RECOVERY  2/27/2013    Performed by [facility] at SURGERY SAME DAY [facility]   • BONE MARROW ASPIRATION  12/31/2012    Performed by Josemanuel Real M.D. at ENDOSCOPY [facility]   • BONE MARROW BIOPSY, NDL/TROCAR  12/31/2012    Performed by Josemanuel Real M.D. at ENDOSCOPY [facility]   • GASTROSCOPY  9/28/2012    Performed by Aravind Richards M.D. at SURGERY [facility]   • SIGMOIDOSCOPY FLEX  9/26/2012    Performed by Kristopher Cardoso M.D. at ENDOSCOPY [facility]   • BRONCHOSCOPY-ENDO  6/19/2012    Performed by MARLENA MURILLO at ENDOSCOPY [facility]   • BRONCHOSCOPY-ENDO  5/29/2012    Performed by SUYAPA CAMARENA at ENDOSCOPY [facility]   • OTHER ABDOMINAL SURGERY  December 2011    [place] [facility] [place]   • GASTROSCOPY-ENDO  3/12/2010    Performed by CAMELIA SAMANO at ENDOSCOPY [facility]   • EXAM UNDER ANESTHESIA  11/11/2009    Performed by BIRD ABDI at SURGERY [facility]   • HEMORRHOIDECTOMY  11/11/2009    Performed by BIRD ABDI at SURGERY [facility]   • KELBY BY LAPAROSCOPY  9/19/2009    Performed by TRINIDAD  BIRD MIN at SURGERY [facility]S   • CARPAL TUNNEL RELEASE          • KELBY BY LAPAROSCOPY     • GASTRIC BYPASS LAPAROSCOPIC     • HERNIA REPAIR      x3   • HYSTERECTOMY, TOTAL ABDOMINAL          • OTHER      Breast reduction   • OTHER      liver transplant   • PB ANESTH,NOSE,SINUS SURGERY      x4   • TONSILLECTOMY         CURRENT MEDICATIONS  No current facility-administered medications for this encounter.     Current Outpatient Prescriptions:   •  gabapentin (NEURONTIN) 600 MG tablet, TAKE 1 TABLET BY MOUTH THREE TIMES A DAY, Disp: 90 Tab, Rfl: 1  •  midodrine (PROAMATINE) 10 MG tablet, Take 1 Tab by mouth 3 times a day, with meals., Disp: 60 Tab, Rfl: 3  •  ambrisentan (LETAIRIS) 10 MG tablet, Take 10 mg by mouth every day., Disp: , Rfl:   •  bisacodyl ([employer] GENTLE LAXATIVE) 10 MG Suppos, Insert 10 mg in rectum every day., Disp: , Rfl:   •  ferrous sulfate (FEOSOL) 220 (44 Fe) MG/5ML Elixir, Take 220 mg by mouth every evening., Disp: , Rfl:   •  lactulose 10 GM/15ML Solution, Take 20 g by mouth every day., Disp: , Rfl:   •  methylnaltrexone (RELISTOR) 12 MG/0.6ML Solution, Inject 12 mg as instructed every day., Disp: , Rfl:   •  ondansetron (ZOFRAN ODT) 4 MG TABLET DISPERSIBLE, Take 4 mg by mouth every 8 hours as needed for Nausea., Disp: , Rfl:   •  predniSONE (DELTASONE) 5 MG Tab, Take 5 mg by mouth every day. Pt also has an RX for 2MG, pt takes total of 7MG QDAY, Disp: , Rfl:   •  tacrolimus (PROGRAF) 1 MG Cap, Take 1 mg by mouth 2 times a day. Pt also has an RX for 0.5MG, pt takes total of 1.5MG BID, Disp: , Rfl:   •  Tadalafil, PAH, (ADCIRCA) 20 MG Tab, Take 20 mg by mouth every bedtime., Disp: , Rfl:   •  Probiotic Product (PROBIOTIC DAILY PO), Take 1 Cap by mouth every day., Disp: , Rfl:   •  tiotropium (SPIRIVA HANDIHALER) 18 MCG Cap, Inhale 1 Cap by mouth every day., Disp: 2 Cap, Rfl: 0  •  furosemide (LASIX) 20 MG Tab, Take 20 mg by mouth every day., Disp: , Rfl:   •  traZODone (DESYREL) 50 MG Tab, Take  2 Tabs by mouth every bedtime., Disp: 180 Tab, Rfl: 1  •  ALPRAZolam (XANAX) 1 MG Tab, Take 1 Tab by mouth 3 times a day for 90 days., Disp: 90 Tab, Rfl: 2  •  pravastatin (PRAVACHOL) 20 MG Tab, Take 1 Tab by mouth every day., Disp: 90 Tab, Rfl: 2  •  Calcium-Magnesium-Vitamin D (CITRACAL CALCIUM+D PO), Take 1 Tab by mouth every day., Disp: , Rfl:   •  docusate sodium (COLACE) 100 MG Cap, Take 100 mg by mouth 3 times a day., Disp: , Rfl:   •  predniSONE (DELTASONE) 1 MG Tab, Take 2 mg by mouth every day. Pt also has an RX for 5MG, pt takes total of 7MG QDAY, Disp: , Rfl:   •  Wheat Dextrin (BENEFIBER) Powder, Take 1 Package by mouth every day., Disp: , Rfl:   •  omeprazole (PRILOSEC) 40 MG delayed-release capsule, Take 1 Cap by mouth every day., Disp: 90 Cap, Rfl: 0  •  aspirin 81 MG tablet, Take 1 Tab by mouth every day., Disp: 90 Tab, Rfl: 0  •  polyethylene glycol 3350 (MIRALAX) Powder, Take 17 g by mouth every 48 hours., Disp: , Rfl:   •  Carboxymethylcell-Hypromellose (GENTEAL) 0.25-0.3 % Gel, Place 0.3 mg in both eyes every bedtime., Disp: , Rfl:   •  Linaclotide (LINZESS) 290 MCG Cap, Take 1 Cap by mouth every day., Disp: 90 Cap, Rfl: 3  •  sertraline (ZOLOFT) 100 MG Tab, Take 1 Tab by mouth every bedtime., Disp: 90 Tab, Rfl: 4  •  levothyroxine (SYNTHROID) 137 MCG Tab, Take 1 Tab by mouth Every morning on an empty stomach., Disp: 90 Tab, Rfl: 3  •  NON SPECIFIED, Take 1 Cap by mouth every evening. Bariatric Dietary Supplment , Disp: , Rfl:   •  tacrolimus (PROGRAF) 0.5 MG Cap, Take 0.5 mg by mouth 2 times a day. Pt also has an RX for 1MG, pt takes total of 1.5MG BID, Disp: , Rfl:   •  ascorbic acid (ASCORBIC ACID) 500 MG Tab, Take 500 mg by mouth every day., Disp: , Rfl:   •  mycophenolate (CELLCEPT) 250 MG Cap, Take 500 mg by mouth 2 times a day., Disp: , Rfl:       ALLERGIES  Allergies   Allergen Reactions   • Rhubarb Anaphylaxis   • Organic Nitrates      Nitroglycerin products should be avoided with the  "use of PDE-5 inhibitors as the combination can result in severe hypotension.   • Vancomycin Hcl      Causes red man syndrome when infused to fast     • Acetaminophen      Can not take, hx of liver transplant    • Nsaids      Can not take due to hx of liver transplant    • Other Drug      Any medication that may interact with cyclosporine, tacrolimus, sirolimus, or prograf (due to hx of liver transplant)        PHYSICAL EXAM  VITAL SIGNS: BP (!) 95/56   Pulse 60   Temp 36.4 °C (97.6 °F) (Temporal)   Resp 19   Ht 1.727 m (5' 8\")   Wt 62.1 kg (137 lb)   LMP 01/03/2000   SpO2 98%   BMI 20.83 kg/m²       Constitutional: Patient appears chronically ill  HENT: Normocephalic, Atraumatic, Bilateral external ears normal, Oropharynx moist, No oral exudates, Nose normal.   Eyes: PERRLA, EOMI, Conjunctiva normal, No discharge.   Neck: Normal range of motion, No tenderness, Supple, No stridor.   Cardiovascular: Normal heart rate, Normal rhythm, No murmurs, No rubs, No gallops.   Thorax & Lungs: Normal breath sounds, No respiratory distress, No wheezing, No chest tenderness.   Abdomen: Bowel sounds hypoactive, incisions are clean dry and intact no dehiscence moderate distention with tenderness noted  Skin: Warm, Dry, No erythema, No rash.   Back: No tenderness, No CVA tenderness.   Extremities: Intact distal pulses, No edema, No tenderness, No cyanosis, No clubbing.   Musculoskeletal: Good range of motion in all major joints. No tenderness to palpation or major deformities noted.   Neurologic: Alert & oriented x 3, Normal motor function, Normal sensory function, No focal deficits noted.   Psychiatric: Anxious      RADIOLOGY/PROCEDURES  CT-ABDOMEN-PELVIS W/O   Final Result      1.  Interval decrease in amount of free intraperitoneal air compared to prior examination which is related to recent abdominal surgical procedure. Minimal free air remains.      2.  Small amount of fluid within the anterior abdomen deep to the " abdominal wall just below the level of the left lobe the liver. This measures up to 1 cm in depth consistent with a postsurgical fluid collection.      3.  No evidence of bowel obstruction.      4.  Again seen cirrhotic appearance of the liver with dilated portal vein and splenomegaly consistent with portal hypertension.      5.  Stable low-density focus within the spleen.      6.  Prior cholecystectomy and gastric bypass procedure.      7.  Atelectasis within the lung bases bilaterally. There is minimal left pleural effusion.      8.  Small ventral hernias which contain fat.      DX-CHEST-PORTABLE (1 VIEW)   Final Result      1.  Coarse bronchovascular markings again noted.   2.  No lobar pneumonia or overt pulmonary edema.   3.  No significant change from prior exam.        Results for orders placed or performed during the hospital encounter of 11/19/18   CBC WITH DIFFERENTIAL   Result Value Ref Range    WBC 2.8 (L) 4.8 - 10.8 K/uL    RBC 4.05 (L) 4.20 - 5.40 M/uL    Hemoglobin 11.9 (L) 12.0 - 16.0 g/dL    Hematocrit 36.5 (L) 37.0 - 47.0 %    MCV 90.1 81.4 - 97.8 fL    MCH 29.4 27.0 - 33.0 pg    MCHC 32.6 (L) 33.6 - 35.0 g/dL    RDW 47.2 35.9 - 50.0 fL    Platelet Count 86 (L) 164 - 446 K/uL    MPV 9.5 9.0 - 12.9 fL    Neutrophils-Polys 53.90 44.00 - 72.00 %    Lymphocytes 35.70 22.00 - 41.00 %    Monocytes 2.60 0.00 - 13.40 %    Eosinophils 5.20 0.00 - 6.90 %    Basophils 1.70 0.00 - 1.80 %    Nucleated RBC 0.00 /100 WBC    Neutrophils (Absolute) 1.53 (L) 2.00 - 7.15 K/uL    Lymphs (Absolute) 1.00 1.00 - 4.80 K/uL    Monos (Absolute) 0.07 0.00 - 0.85 K/uL    Eos (Absolute) 0.15 0.00 - 0.51 K/uL    Baso (Absolute) 0.05 0.00 - 0.12 K/uL    NRBC (Absolute) 0.00 K/uL    Anisocytosis 2+     Microcytosis 2+    COMP METABOLIC PANEL   Result Value Ref Range    Sodium 141 135 - 145 mmol/L    Potassium 4.1 3.6 - 5.5 mmol/L    Chloride 106 96 - 112 mmol/L    Co2 26 20 - 33 mmol/L    Anion Gap 9.0 0.0 - 11.9    Glucose 123  (H) 65 - 99 mg/dL    Bun 13 8 - 22 mg/dL    Creatinine 0.99 0.50 - 1.40 mg/dL    Calcium 8.8 8.5 - 10.5 mg/dL    AST(SGOT) 23 12 - 45 U/L    ALT(SGPT) 15 2 - 50 U/L    Alkaline Phosphatase 33 30 - 99 U/L    Total Bilirubin 0.4 0.1 - 1.5 mg/dL    Albumin 3.8 3.2 - 4.9 g/dL    Total Protein 6.0 6.0 - 8.2 g/dL    Globulin 2.2 1.9 - 3.5 g/dL    A-G Ratio 1.7 g/dL   LIPASE   Result Value Ref Range    Lipase 15 11 - 82 U/L   URINALYSIS,CULTURE IF INDICATED   Result Value Ref Range    Color Yellow     Character Clear     Specific Gravity 1.014 <1.035    Ph 5.5 5.0 - 8.0    Glucose Negative Negative mg/dL    Ketones Negative Negative mg/dL    Protein Negative Negative mg/dL    Bilirubin Negative Negative    Urobilinogen, Urine 0.2 Negative    Nitrite Negative Negative    Leukocyte Esterase Negative Negative    Occult Blood Negative Negative    Micro Urine Req see below    TROPONIN   Result Value Ref Range    Troponin I <0.01 0.00 - 0.04 ng/mL   LACTIC ACID   Result Value Ref Range    Lactic Acid 1.0 0.5 - 2.0 mmol/L   CORTISOL   Result Value Ref Range    Cortisol 2.6 0.0 - 23.0 ug/dL   DIFFERENTIAL MANUAL   Result Value Ref Range    Bands-Stabs 0.90 0.00 - 10.00 %    Manual Diff Status PERFORMED    PERIPHERAL SMEAR REVIEW   Result Value Ref Range    Peripheral Smear Review see below    PLATELET ESTIMATE   Result Value Ref Range    Plt Estimation Decreased    MORPHOLOGY   Result Value Ref Range    RBC Morphology Present     Poikilocytosis 1+     Ovalocytes 1+     Schistocytes 1+    ESTIMATED GFR   Result Value Ref Range    GFR If African American >60 >60 mL/min/1.73 m 2    GFR If Non  57 (A) >60 mL/min/1.73 m 2     *Note: Due to a large number of results and/or encounters for the requested time period, some results have not been displayed. A complete set of results can be found in Results Review.        COURSE & MEDICAL DECISION MAKING  Pertinent Labs & Imaging studies reviewed. (See chart for details)  An IV  was established.  Patient was given several doses of nausea and pain medication as well as low-dose ketamine.  Here she clinically has evidence of likely early pneumonia and given the fact of her oxygen dependent, immunocompromised state and recent hospitalization she was given IV fluids and IV Zosyn she will be admitted to internal medicine.  CT scan did not demonstrate any obvious obstruction or any obvious postoperative process that would require surgical consultation    FINAL IMPRESSION  1.  Hospital-acquired pneumonia in immunocompromised state    Admission         Electronically signed by: Ramses Gregorio, 11/19/2018 2:46 PM

## 2018-11-20 DIAGNOSIS — I27.20 PULMONARY HYPERTENSION (HCC): Primary | ICD-10-CM

## 2018-11-20 PROBLEM — J96.21 ACUTE AND CHRONIC RESPIRATORY FAILURE WITH HYPOXIA (HCC): Status: ACTIVE | Noted: 2018-11-20

## 2018-11-20 PROBLEM — Z76.5 DRUG-SEEKING BEHAVIOR: Status: ACTIVE | Noted: 2018-11-20

## 2018-11-20 LAB
ALBUMIN SERPL BCP-MCNC: 2.9 G/DL (ref 3.2–4.9)
ALBUMIN/GLOB SERPL: 1.5 G/DL
ALP SERPL-CCNC: 25 U/L (ref 30–99)
ALT SERPL-CCNC: 9 U/L (ref 2–50)
ANION GAP SERPL CALC-SCNC: 9 MMOL/L (ref 0–11.9)
AST SERPL-CCNC: 18 U/L (ref 12–45)
BACTERIA BLD CULT: NORMAL
BACTERIA BLD CULT: NORMAL
BILIRUB SERPL-MCNC: 0.2 MG/DL (ref 0.1–1.5)
BUN SERPL-MCNC: 11 MG/DL (ref 8–22)
CALCIUM SERPL-MCNC: 7.8 MG/DL (ref 8.5–10.5)
CHLORIDE SERPL-SCNC: 110 MMOL/L (ref 96–112)
CO2 SERPL-SCNC: 25 MMOL/L (ref 20–33)
CREAT SERPL-MCNC: 1.08 MG/DL (ref 0.5–1.4)
ERYTHROCYTE [DISTWIDTH] IN BLOOD BY AUTOMATED COUNT: 48.4 FL (ref 35.9–50)
GLOBULIN SER CALC-MCNC: 1.9 G/DL (ref 1.9–3.5)
GLUCOSE SERPL-MCNC: 82 MG/DL (ref 65–99)
HCT VFR BLD AUTO: 33.4 % (ref 37–47)
HGB BLD-MCNC: 10.8 G/DL (ref 12–16)
MCH RBC QN AUTO: 29.9 PG (ref 27–33)
MCHC RBC AUTO-ENTMCNC: 32.3 G/DL (ref 33.6–35)
MCV RBC AUTO: 92.5 FL (ref 81.4–97.8)
PLATELET # BLD AUTO: 77 K/UL (ref 164–446)
PMV BLD AUTO: 9.5 FL (ref 9–12.9)
POTASSIUM SERPL-SCNC: 3.7 MMOL/L (ref 3.6–5.5)
PROCALCITONIN SERPL-MCNC: <0.05 NG/ML
PROT SERPL-MCNC: 4.8 G/DL (ref 6–8.2)
RBC # BLD AUTO: 3.61 M/UL (ref 4.2–5.4)
SIGNIFICANT IND 70042: NORMAL
SIGNIFICANT IND 70042: NORMAL
SITE SITE: NORMAL
SITE SITE: NORMAL
SODIUM SERPL-SCNC: 144 MMOL/L (ref 135–145)
SOURCE SOURCE: NORMAL
SOURCE SOURCE: NORMAL
WBC # BLD AUTO: 3.2 K/UL (ref 4.8–10.8)

## 2018-11-20 PROCEDURE — A9270 NON-COVERED ITEM OR SERVICE: HCPCS | Performed by: INTERNAL MEDICINE

## 2018-11-20 PROCEDURE — 700111 HCHG RX REV CODE 636 W/ 250 OVERRIDE (IP): Performed by: INTERNAL MEDICINE

## 2018-11-20 PROCEDURE — 94760 N-INVAS EAR/PLS OXIMETRY 1: CPT

## 2018-11-20 PROCEDURE — 85027 COMPLETE CBC AUTOMATED: CPT

## 2018-11-20 PROCEDURE — 84145 PROCALCITONIN (PCT): CPT

## 2018-11-20 PROCEDURE — 770001 HCHG ROOM/CARE - MED/SURG/GYN PRIV*

## 2018-11-20 PROCEDURE — 700102 HCHG RX REV CODE 250 W/ 637 OVERRIDE(OP): Performed by: INTERNAL MEDICINE

## 2018-11-20 PROCEDURE — 99233 SBSQ HOSP IP/OBS HIGH 50: CPT | Performed by: INTERNAL MEDICINE

## 2018-11-20 PROCEDURE — 700102 HCHG RX REV CODE 250 W/ 637 OVERRIDE(OP): Performed by: HOSPITALIST

## 2018-11-20 PROCEDURE — 36415 COLL VENOUS BLD VENIPUNCTURE: CPT

## 2018-11-20 PROCEDURE — 80053 COMPREHEN METABOLIC PANEL: CPT

## 2018-11-20 RX ORDER — ASCORBIC ACID 500 MG
500 TABLET ORAL EVERY MORNING
Status: DISCONTINUED | OUTPATIENT
Start: 2018-11-20 | End: 2018-11-28 | Stop reason: HOSPADM

## 2018-11-20 RX ORDER — BISACODYL 10 MG
20 SUPPOSITORY, RECTAL RECTAL EVERY MORNING
Status: DISCONTINUED | OUTPATIENT
Start: 2018-11-20 | End: 2018-11-28 | Stop reason: HOSPADM

## 2018-11-20 RX ORDER — FUROSEMIDE 20 MG/1
20 TABLET ORAL EVERY MORNING
Status: DISCONTINUED | OUTPATIENT
Start: 2018-11-20 | End: 2018-11-28 | Stop reason: HOSPADM

## 2018-11-20 RX ORDER — TADALAFIL 20 MG/1
20 TABLET ORAL
Qty: 90 TAB | Refills: 3 | Status: SHIPPED | OUTPATIENT
Start: 2018-11-20 | End: 2019-09-18 | Stop reason: SDUPTHER

## 2018-11-20 RX ORDER — ONDANSETRON 4 MG/1
4 TABLET, ORALLY DISINTEGRATING ORAL EVERY 4 HOURS PRN
Status: DISCONTINUED | OUTPATIENT
Start: 2018-11-20 | End: 2018-11-28 | Stop reason: HOSPADM

## 2018-11-20 RX ORDER — HEPARIN SODIUM 5000 [USP'U]/ML
5000 INJECTION, SOLUTION INTRAVENOUS; SUBCUTANEOUS EVERY 8 HOURS
Status: DISCONTINUED | OUTPATIENT
Start: 2018-11-20 | End: 2018-11-20

## 2018-11-20 RX ORDER — METHYLPREDNISOLONE SODIUM SUCCINATE 125 MG/2ML
62.5 INJECTION, POWDER, LYOPHILIZED, FOR SOLUTION INTRAMUSCULAR; INTRAVENOUS EVERY 12 HOURS
Status: DISCONTINUED | OUTPATIENT
Start: 2018-11-20 | End: 2018-11-21

## 2018-11-20 RX ORDER — MORPHINE SULFATE 10 MG/ML
4 INJECTION, SOLUTION INTRAMUSCULAR; INTRAVENOUS EVERY 4 HOURS PRN
Status: DISCONTINUED | OUTPATIENT
Start: 2018-11-20 | End: 2018-11-23

## 2018-11-20 RX ORDER — TIOTROPIUM BROMIDE 18 UG/1
1 CAPSULE ORAL; RESPIRATORY (INHALATION) DAILY
Status: DISCONTINUED | OUTPATIENT
Start: 2018-11-20 | End: 2018-11-28 | Stop reason: HOSPADM

## 2018-11-20 RX ORDER — OXYCODONE HYDROCHLORIDE 10 MG/1
10 TABLET ORAL 3 TIMES DAILY
Status: DISCONTINUED | OUTPATIENT
Start: 2018-11-20 | End: 2018-11-23

## 2018-11-20 RX ORDER — MORPHINE SULFATE 10 MG/ML
4 INJECTION, SOLUTION INTRAMUSCULAR; INTRAVENOUS ONCE
Status: COMPLETED | OUTPATIENT
Start: 2018-11-20 | End: 2018-11-20

## 2018-11-20 RX ORDER — BISACODYL 5 MG
5 TABLET, DELAYED RELEASE (ENTERIC COATED) ORAL DAILY
Status: DISCONTINUED | OUTPATIENT
Start: 2018-11-20 | End: 2018-11-28 | Stop reason: HOSPADM

## 2018-11-20 RX ORDER — TADALAFIL 20 MG/1
20 TABLET ORAL
Status: DISCONTINUED | OUTPATIENT
Start: 2018-11-20 | End: 2018-11-28 | Stop reason: HOSPADM

## 2018-11-20 RX ORDER — ONDANSETRON 2 MG/ML
4 INJECTION INTRAMUSCULAR; INTRAVENOUS EVERY 4 HOURS PRN
Status: DISCONTINUED | OUTPATIENT
Start: 2018-11-20 | End: 2018-11-28 | Stop reason: HOSPADM

## 2018-11-20 RX ORDER — CALCIUM POLYCARBOPHIL 625 MG 625 MG/1
625 TABLET ORAL EVERY MORNING
Status: DISCONTINUED | OUTPATIENT
Start: 2018-11-20 | End: 2018-11-28 | Stop reason: HOSPADM

## 2018-11-20 RX ADMIN — TADALAFIL 20 MG: 20 TABLET ORAL at 04:15

## 2018-11-20 RX ADMIN — OXYCODONE HYDROCHLORIDE 10 MG: 10 TABLET ORAL at 08:42

## 2018-11-20 RX ADMIN — STANDARDIZED SENNA CONCENTRATE AND DOCUSATE SODIUM 2 TABLET: 8.6; 5 TABLET, FILM COATED ORAL at 05:53

## 2018-11-20 RX ADMIN — MORPHINE SULFATE 4 MG: 10 INJECTION INTRAVENOUS at 10:57

## 2018-11-20 RX ADMIN — OXYCODONE HYDROCHLORIDE AND ACETAMINOPHEN 500 MG: 500 TABLET ORAL at 08:42

## 2018-11-20 RX ADMIN — GUAIFENESIN AND DEXTROMETHORPHAN 5 ML: 100; 10 SYRUP ORAL at 06:04

## 2018-11-20 RX ADMIN — MORPHINE SULFATE 4 MG: 10 INJECTION INTRAVENOUS at 15:01

## 2018-11-20 RX ADMIN — FUROSEMIDE 20 MG: 20 TABLET ORAL at 08:42

## 2018-11-20 RX ADMIN — AZITHROMYCIN 500 MG: 250 TABLET, FILM COATED ORAL at 21:48

## 2018-11-20 RX ADMIN — SERTRALINE 100 MG: 100 TABLET, FILM COATED ORAL at 21:48

## 2018-11-20 RX ADMIN — ONDANSETRON 4 MG: 4 TABLET, ORALLY DISINTEGRATING ORAL at 15:05

## 2018-11-20 RX ADMIN — MYCOPHENOLATE MOFETIL 500 MG: 250 CAPSULE ORAL at 05:52

## 2018-11-20 RX ADMIN — METHYLPREDNISOLONE SODIUM SUCCINATE 62.5 MG: 125 INJECTION, POWDER, FOR SOLUTION INTRAMUSCULAR; INTRAVENOUS at 10:58

## 2018-11-20 RX ADMIN — MORPHINE SULFATE 2 MG: 10 INJECTION INTRAVENOUS at 06:11

## 2018-11-20 RX ADMIN — PRAVASTATIN SODIUM 20 MG: 20 TABLET ORAL at 05:53

## 2018-11-20 RX ADMIN — GABAPENTIN 600 MG: 300 CAPSULE ORAL at 05:53

## 2018-11-20 RX ADMIN — GABAPENTIN 600 MG: 300 CAPSULE ORAL at 18:34

## 2018-11-20 RX ADMIN — GABAPENTIN 600 MG: 300 CAPSULE ORAL at 12:46

## 2018-11-20 RX ADMIN — AMBRISENTAN 10 MG: 10 TABLET, FILM COATED ORAL at 06:00

## 2018-11-20 RX ADMIN — ONDANSETRON 4 MG: 4 TABLET, ORALLY DISINTEGRATING ORAL at 15:06

## 2018-11-20 RX ADMIN — GUAIFENESIN AND DEXTROMETHORPHAN 5 ML: 100; 10 SYRUP ORAL at 19:46

## 2018-11-20 RX ADMIN — TADALAFIL 20 MG: 20 TABLET ORAL at 21:00

## 2018-11-20 RX ADMIN — MORPHINE SULFATE 4 MG: 10 INJECTION INTRAVENOUS at 21:49

## 2018-11-20 RX ADMIN — CALCIUM POLYCARBOPHIL 625 MG: 625 TABLET, FILM COATED ORAL at 10:45

## 2018-11-20 RX ADMIN — TACROLIMUS 1.5 MG: 1 CAPSULE ORAL at 05:52

## 2018-11-20 RX ADMIN — METHYLNALTREXONE BROMIDE 12 MG: 12 INJECTION, SOLUTION SUBCUTANEOUS at 10:46

## 2018-11-20 RX ADMIN — OMEPRAZOLE 40 MG: 20 CAPSULE, DELAYED RELEASE ORAL at 06:04

## 2018-11-20 RX ADMIN — BISACODYL 20 MG: 10 SUPPOSITORY RECTAL at 10:44

## 2018-11-20 RX ADMIN — MYCOPHENOLATE MOFETIL 500 MG: 250 CAPSULE ORAL at 18:35

## 2018-11-20 RX ADMIN — TACROLIMUS 1.5 MG: 1 CAPSULE ORAL at 18:35

## 2018-11-20 RX ADMIN — ALPRAZOLAM 1 MG: 1 TABLET ORAL at 05:53

## 2018-11-20 RX ADMIN — ALPRAZOLAM 1 MG: 1 TABLET ORAL at 18:34

## 2018-11-20 RX ADMIN — ALPRAZOLAM 1 MG: 1 TABLET ORAL at 12:46

## 2018-11-20 RX ADMIN — OXYCODONE HYDROCHLORIDE 10 MG: 10 TABLET ORAL at 18:34

## 2018-11-20 RX ADMIN — MORPHINE SULFATE 4 MG: 10 INJECTION INTRAVENOUS at 08:42

## 2018-11-20 RX ADMIN — LEVOTHYROXINE SODIUM 137 MCG: 137 TABLET ORAL at 05:53

## 2018-11-20 RX ADMIN — ONDANSETRON 4 MG: 4 TABLET, ORALLY DISINTEGRATING ORAL at 21:48

## 2018-11-20 RX ADMIN — MORPHINE SULFATE 4 MG: 10 INJECTION INTRAVENOUS at 18:42

## 2018-11-20 RX ADMIN — TIOTROPIUM BROMIDE 1 CAPSULE: 18 CAPSULE ORAL; RESPIRATORY (INHALATION) at 16:18

## 2018-11-20 RX ADMIN — OXYCODONE HYDROCHLORIDE 10 MG: 10 TABLET ORAL at 12:46

## 2018-11-20 RX ADMIN — METHYLPREDNISOLONE SODIUM SUCCINATE 62.5 MG: 125 INJECTION, POWDER, FOR SOLUTION INTRAMUSCULAR; INTRAVENOUS at 18:36

## 2018-11-20 RX ADMIN — Medication 81 MG: at 05:52

## 2018-11-20 RX ADMIN — TRAZODONE HYDROCHLORIDE 100 MG: 100 TABLET ORAL at 21:48

## 2018-11-20 ASSESSMENT — PAIN SCALES - GENERAL
PAINLEVEL_OUTOF10: 10
PAINLEVEL_OUTOF10: 8
PAINLEVEL_OUTOF10: 8
PAINLEVEL_OUTOF10: 4

## 2018-11-20 ASSESSMENT — LIFESTYLE VARIABLES: EVER_SMOKED: NEVER

## 2018-11-20 NOTE — ED NOTES
Pt upset that hospitalist will not order 4mg of morphine every 4hours but orders 2mg of morphine.   hospitalist at bedside. Pt getting tearful and angry.

## 2018-11-20 NOTE — H&P
Hospital Medicine History and Physical      Date of Service  11/19/2018    Chief Complaint  Chief Complaint   Patient presents with   • Flank Pain   • Abdominal Pain   • Abdominal Swelling       History of Presenting Illness  Bereket is a 58 y.o. female PMH of recent history of hernia surgery about a week ago, liver transplant patient, chronic pain syndrome, who presents with fever, chills, cough and worsening abdominal pain since she was discharged a week ago.  She complained about productive cough with yellowish sputum production.  Associated with shortness of breath and dyspnea on exertion.  The patient is oxygen dependent and uses 4-5 L of oxygen at home.  She is also on oxycodone 10 mg twice daily for her chronic pain syndrome and she is set she see pain specialist as an outpatient even though the medication does nothing for her.  CT scan of the abdomen was done in ER showed no acute finding.  ER she was found to have acute bronchitis.  She will be admitted to the floor for further management.    Primary Care Physician  Elisa Phillip M.D.      Code Status  Full code    Review of Systems  Review of Systems   Constitutional: Positive for chills and fever. Negative for weight loss.   HENT: Negative for congestion and nosebleeds.    Eyes: Negative for blurred vision, pain, discharge and redness.   Respiratory: Positive for cough, sputum production and shortness of breath. Negative for stridor.    Cardiovascular: Negative for chest pain, palpitations and orthopnea.   Gastrointestinal: Positive for abdominal pain. Negative for diarrhea, heartburn, nausea and vomiting.   Genitourinary: Negative for dysuria, frequency and urgency.   Musculoskeletal: Negative for back pain, myalgias and neck pain.   Skin: Negative for itching and rash.   Neurological: Negative for dizziness, focal weakness, seizures and headaches.   Psychiatric/Behavioral: Negative for depression. The patient is not nervous/anxious and does not have  "insomnia.      Please see HPI, all other systems were reviewed and are negative (AMA/CMS criteria)     Past Medical History  Past Medical History:   Diagnosis Date   • Low back pain 02-17-17    and left foot, 7/10   • H/O Clostridium difficile infection 02-17-17    reports \"16 months ago\". Current stool sample 01-26-17 neg   • Cirrhosis (HCC) December 2011    Status post liver transplant at [facility]   • Jaundice 2009   • Anemia    • Anesthesia     \"takes more to get me under, would rather be intubated\"    • Breath shortness     cont oxygen 5L (Preffered)   • Bronchitis      2016   • Cardiomegaly    • Chronic fatigue and malaise    • CKD (chronic kidney disease) stage 3, GFR 30-59 ml/min ([facility])    • Diabetes ([facility])     reports hx of, resolved w/weight loss. Reports still checking bloodsugars twice daily and using insulin PRN   • Fracture of left foot    • GERD (gastroesophageal reflux disease)         • Hemorrhagic disorder (HCC)     \"low platelets\" bruises easily    • Hiatus hernia syndrome    • High cholesterol    • Hypothyroid    • Liver transplanted (HCC)    • Mild aortic regurgitation and aortic valve sclerosis     • On home oxygen therapy     5 liters, Dr. Gaming   • Platelet disorder (HCC)     low platelets   • Pneumonia     aspiration,    • Psychiatric disorder     Mood disorder   • Pulmonary hypertension ([facility])        • S/P cholecystectomy    • Small bowel obstruction (HCC)     01-   • Splenomegaly    • VRE (vancomycin-resistant Enterococci)     02-17-17, pt declines knowledge of this       Surgical History  Past Surgical History:   Procedure Laterality Date   • VENTRAL HERNIA REPAIR ROBOTIC XI N/A 11/12/2018    Procedure: VENTRAL HERNIA REPAIR ROBOTIC XI- FOR EPIGASTRIC INCISIONAL;  Surgeon: John H Ganser, M.D.;  Location: SURGERY [facility];  Service: General   • TURBINATE REDUCTION Bilateral 10/4/2018    Procedure: TURBINATE REDUCTION;  Surgeon: Silviano Erazo M.D.;  Location: SURGERY " SAME DAY [facility];  Service: Ent   • NASAL RECONSTRUCTION Bilateral 10/4/2018    Procedure: NASAL RECONSTRUCTION- LATERA IMPLANTS;  Surgeon: Silviano Erazo M.D.;  Location: SURGERY SAME DAY [facility];  Service: Ent   • OTHER  12/11/2017    paniculectomy   • COLONOSCOPY N/A 3/27/2017    Procedure: COLONOSCOPY;  Surgeon: Osman Matt M.D.;  Location: SURGERY SAME DAY [facility];  Service:    • GASTROSCOPY N/A 3/27/2017    Procedure: GASTROSCOPY;  Surgeon: Osman Matt M.D.;  Location: SURGERY SAME DAY [facility];  Service:    • BREAST BIOPSY Left 2/21/2017    Procedure: BREAST BIOPSY - WIRE LOCALIZED EXCISONAL ;  Surgeon: Jane Zhao M.D.;  Location: SURGERY SAME DAY [facility];  Service:    • LUNG BIOPSY OPEN  11/2016   • OTHER ABDOMINAL SURGERY      Gasric Sleeve   • BONE MARROW ASPIRATION  3/16/2015    Performed by Freddie Hi M.D. at ENDOSCOPY [facility]   • BONE MARROW BIOPSY, NDL/TROCAR  3/16/2015    Performed by Freddie Hi M.D. at ENDOSCOPY [facility]   • RECOVERY  3/31/2014    Performed by [facility] at SURGERY [facility]   • RECOVERY  3/24/2014    Performed by [facility]Recovery Surgery at SURGERY SAME DAY [facility]   • RECOVERY  1/21/2014    Performed by [facility] at SURGERY SAME DAY [facility]   • BRONCHOSCOPY-ENDO  11/15/2013    Performed by Ha Perez M.D. at ENDOSCOPY [facility]   • RECOVERY  2/27/2013    Performed by [facility] at SURGERY SAME DAY [facility]   • BONE MARROW ASPIRATION  12/31/2012    Performed by Josemanuel Real M.D. at ENDOSCOPY [facility]   • BONE MARROW BIOPSY, NDL/TROCAR  12/31/2012    Performed by Josemanuel Real M.D. at ENDOSCOPY [facility]   • GASTROSCOPY  9/28/2012    Performed by Aravind Richards M.D. at SURGERY [facility]   • SIGMOIDOSCOPY FLEX  9/26/2012    Performed by Kristopher Cardoso M.D. at ENDOSCOPY [facility]   • BRONCHOSCOPY-ENDO  6/19/2012    Performed  by MARLENA MURILLO at ENDOSCOPY [facility]   • BRONCHOSCOPY-ENDO  5/29/2012    Performed by SUYAPA CAMARENA at ENDOSCOPY [facility]   • OTHER ABDOMINAL SURGERY  December 2011    Liver transplant at [facility] by Dr. Canada.   • GASTROSCOPY-ENDO  3/12/2010    Performed by CAMELIA SAMANO at ENDOSCOPY [facility]   • EXAM UNDER ANESTHESIA  11/11/2009    Performed by BIRD ABDI at SURGERY [facility]   • HEMORRHOIDECTOMY  11/11/2009    Performed by BIRD ABDI at SURGERY [facility]   • KELBY BY LAPAROSCOPY  9/19/2009    Performed by BIRD ABDI at SURGERY [facility]   • CARPAL TUNNEL RELEASE          • KELBY BY LAPAROSCOPY     • GASTRIC BYPASS LAPAROSCOPIC     • HERNIA REPAIR      x3   • HYSTERECTOMY, TOTAL ABDOMINAL          • OTHER      Breast reduction   • OTHER      liver transplant   • PB ANESTH,NOSE,SINUS SURGERY      x4   • TONSILLECTOMY         Medications  No current facility-administered medications on file prior to encounter.      Current Outpatient Prescriptions on File Prior to Encounter   Medication Sig Dispense Refill   • gabapentin (NEURONTIN) 600 MG tablet TAKE 1 TABLET BY MOUTH THREE TIMES A DAY 90 Tab 1   • midodrine (PROAMATINE) 10 MG tablet Take 1 Tab by mouth 3 times a day, with meals. 60 Tab 3   • ambrisentan (LETAIRIS) 10 MG tablet Take 10 mg by mouth every day.     • bisacodyl ([employer] GENTLE LAXATIVE) 10 MG Suppos Insert 10 mg in rectum every day.     • ferrous sulfate (FEOSOL) 220 (44 Fe) MG/5ML Elixir Take 220 mg by mouth every evening.     • lactulose 10 GM/15ML Solution Take 20 g by mouth every day.     • methylnaltrexone (RELISTOR) 12 MG/0.6ML Solution Inject 12 mg as instructed every day.     • ondansetron (ZOFRAN ODT) 4 MG TABLET DISPERSIBLE Take 4 mg by mouth every 8 hours as needed for Nausea.     • predniSONE (DELTASONE) 5 MG Tab Take 5 mg by mouth every day. Pt also has an RX for 2MG, pt takes total of 7MG QDAY     • tacrolimus (PROGRAF) 1 MG  Cap Take 1 mg by mouth 2 times a day. Pt also has an RX for 0.5MG, pt takes total of 1.5MG BID     • Tadalafil, PAH, (ADCIRCA) 20 MG Tab Take 20 mg by mouth every bedtime.     • Probiotic Product (PROBIOTIC DAILY PO) Take 1 Cap by mouth every day.     • tiotropium (SPIRIVA HANDIHALER) 18 MCG Cap Inhale 1 Cap by mouth every day. 2 Cap 0   • furosemide (LASIX) 20 MG Tab Take 20 mg by mouth every day.     • traZODone (DESYREL) 50 MG Tab Take 2 Tabs by mouth every bedtime. 180 Tab 1   • ALPRAZolam (XANAX) 1 MG Tab Take 1 Tab by mouth 3 times a day for 90 days. 90 Tab 2   • pravastatin (PRAVACHOL) 20 MG Tab Take 1 Tab by mouth every day. 90 Tab 2   • Calcium-Magnesium-Vitamin D (CITRACAL CALCIUM+D PO) Take 1 Tab by mouth every day.     • docusate sodium (COLACE) 100 MG Cap Take 100 mg by mouth 3 times a day.     • predniSONE (DELTASONE) 1 MG Tab Take 2 mg by mouth every day. Pt also has an RX for 5MG, pt takes total of 7MG QDAY     • Wheat Dextrin (BENEFIBER) Powder Take 1 Package by mouth every day.     • omeprazole (PRILOSEC) 40 MG delayed-release capsule Take 1 Cap by mouth every day. 90 Cap 0   • aspirin 81 MG tablet Take 1 Tab by mouth every day. 90 Tab 0   • polyethylene glycol 3350 (MIRALAX) Powder Take 17 g by mouth every 48 hours.     • Carboxymethylcell-Hypromellose (GENTEAL) 0.25-0.3 % Gel Place 0.3 mg in both eyes every bedtime.     • Linaclotide (LINZESS) 290 MCG Cap Take 1 Cap by mouth every day. 90 Cap 3   • sertraline (ZOLOFT) 100 MG Tab Take 1 Tab by mouth every bedtime. 90 Tab 4   • levothyroxine (SYNTHROID) 137 MCG Tab Take 1 Tab by mouth Every morning on an empty stomach. 90 Tab 3   • NON SPECIFIED Take 1 Cap by mouth every evening. Bariatric Dietary Supplment      • tacrolimus (PROGRAF) 0.5 MG Cap Take 0.5 mg by mouth 2 times a day. Pt also has an RX for 1MG, pt takes total of 1.5MG BID     • ascorbic acid (ASCORBIC ACID) 500 MG Tab Take 500 mg by mouth every day.     • mycophenolate (CELLCEPT) 250  MG Cap Take 500 mg by mouth 2 times a day.       Family History  Family History   Problem Relation Age of Onset   • Other Father         Unknown (dead 10 years)   • Diabetes Father    • Heart Disease Father    • Hypertension Father    • Hyperlipidemia Father    • Stroke Father    • Non-contributory Mother    • Hyperlipidemia Mother    • Alcohol/Drug Mother    • Cancer Paternal Aunt    • Alcohol/Drug Maternal Grandmother    • Alcohol/Drug Maternal Grandfather          Social History  Social History   Substance Use Topics   • Smoking status: Never Smoker   • Smokeless tobacco: Never Used      Comment: avoid all tobacco products   • Alcohol use No      Comment: 2009 quit drinking       Allergies  Allergies   Allergen Reactions   • Rhubarb Anaphylaxis   • Organic Nitrates      Nitroglycerin products should be avoided with the use of PDE-5 inhibitors as the combination can result in severe hypotension.   • Vancomycin Hcl      Causes red man syndrome when infused to fast     • Acetaminophen      Can not take, hx of liver transplant    • Nsaids      Can not take due to hx of liver transplant    • Other Drug      Any medication that may interact with cyclosporine, tacrolimus, sirolimus, or prograf (due to hx of liver transplant)         Physical Exam  Laboratory   Hemodynamics  Temp (24hrs), Av.4 °C (97.6 °F), Min:36.4 °C (97.6 °F), Max:36.4 °C (97.6 °F)   Temperature: 36.4 °C (97.6 °F)  Pulse  Av.1  Min: 60  Max: 87 Heart Rate (Monitored): 61  Blood Pressure: (!) 95/56, NIBP: 147/75      Respiratory      Respiration: 19, Pulse Oximetry: 98 %             Physical Exam   Constitutional: She is oriented to person, place, and time. No distress.   HENT:   Head: Normocephalic and atraumatic.   Mouth/Throat: Oropharynx is clear and moist.   Eyes: Pupils are equal, round, and reactive to light. Conjunctivae and EOM are normal.   Neck: Normal range of motion. Neck supple. No tracheal deviation present. No thyromegaly  present.   Cardiovascular: Normal rate and regular rhythm.    No murmur heard.  Pulmonary/Chest: Effort normal and breath sounds normal. No respiratory distress. She has no wheezes.   Abdominal: Soft. Bowel sounds are normal. She exhibits no distension. There is tenderness. There is no rebound.   Musculoskeletal: She exhibits no edema or tenderness.   Neurological: She is alert and oriented to person, place, and time. No cranial nerve deficit.   Skin: Skin is warm and dry. She is not diaphoretic. No erythema.   Psychiatric: She has a normal mood and affect. Her behavior is normal. Thought content normal.       Recent Labs      11/19/18   1454   WBC  2.8*   RBC  4.05*   HEMOGLOBIN  11.9*   HEMATOCRIT  36.5*   MCV  90.1   MCH  29.4   MCHC  32.6*   RDW  47.2   PLATELETCT  86*   MPV  9.5     Recent Labs      11/19/18   1454   SODIUM  141   POTASSIUM  4.1   CHLORIDE  106   CO2  26   GLUCOSE  123*   BUN  13   CREATININE  0.99   CALCIUM  8.8     Recent Labs      11/19/18   1454   ALTSGPT  15   ASTSGOT  23   ALKPHOSPHAT  33   TBILIRUBIN  0.4   LIPASE  15   GLUCOSE  123*                 Lab Results   Component Value Date    TROPONINI <0.01 11/19/2018       Imaging  CT-ABDOMEN-PELVIS W/O   Final Result      1.  Interval decrease in amount of free intraperitoneal air compared to prior examination which is related to recent abdominal surgical procedure. Minimal free air remains.      2.  Small amount of fluid within the anterior abdomen deep to the abdominal wall just below the level of the left lobe the liver. This measures up to 1 cm in depth consistent with a postsurgical fluid collection.      3.  No evidence of bowel obstruction.      4.  Again seen cirrhotic appearance of the liver with dilated portal vein and splenomegaly consistent with portal hypertension.      5.  Stable low-density focus within the spleen.      6.  Prior cholecystectomy and gastric bypass procedure.      7.  Atelectasis within the lung bases  bilaterally. There is minimal left pleural effusion.      8.  Small ventral hernias which contain fat.      DX-CHEST-PORTABLE (1 VIEW)   Final Result      1.  Coarse bronchovascular markings again noted.   2.  No lobar pneumonia or overt pulmonary edema.   3.  No significant change from prior exam.             Assessment/Plan     I anticipate this patient will require at least two midnights for appropriate medical management, necessitating inpatient admission.    Chronic respiratory failure with hypoxia (HCC)- (present on admission)   Assessment & Plan    On 4-5 L of home o2     Pancytopenia (HCC)- (present on admission)   Assessment & Plan    Follow cbc in am  History of splenomegaly     Bronchitis- (present on admission)   Assessment & Plan    Started on IV ceftriaxone and azithromycin  Follow culture       Chronic generalized abdominal pain- (present on admission)   Assessment & Plan    She stated that she take oxycodone 10 mg twice daily  Put on 2 mg IV morphine q. 4-hour as needed for now  Now discontinue oxycodone for now:   To reevaluate in the morning     Status post liver transplant Dr. Canada ([facility])- (present on admission)   Assessment & Plan    Continue outpatient medication with tacrolimus and mycophenolate         Prophylaxis:  scds

## 2018-11-20 NOTE — ASSESSMENT & PLAN NOTE
-Continue IV ceftriaxone and azithromycin, d4  -plan to complete 5 days course of antibiotics, curbside ID done.  -continue w/ duonebs  -wean oxygen to maintain oxygen saturation between 88-92%  Close monitoring

## 2018-11-20 NOTE — CARE PLAN
Problem: Hyperinflation:  Goal: Prevent or improve atelectasis  Outcome: PROGRESSING AS EXPECTED  Pt is on 4LPM   SPO2 is 95%   60% of IS value is 1470  Best IS value for today is 2500

## 2018-11-20 NOTE — RESPIRATORY CARE
COPD EDUCATION by COPD CLINICAL EDUCATOR  11/20/2018 at 7:41 AM by Maci Velásquez     Patient reviewed by COPD education team. Patient does not qualify for COPD program.

## 2018-11-20 NOTE — ASSESSMENT & PLAN NOTE
-Continue IV morphine 4 mg every 3hours PRN.   11/17:  Persistent c/o abdominal pain daily, ordered CT abdomen/pelvis with po and iV contrast.  Had recent lap hernia repair 2 weeks ago by Dr. Ganser.  Admission CT abdomen with peritoneal air seen likely due to post op.  Prior Evon-en-Y, liver transplant.

## 2018-11-20 NOTE — ED NOTES
Med rec complete per pt's med list- reviewed with pt. Pt's  will bring in non-formulary medications.

## 2018-11-20 NOTE — ASSESSMENT & PLAN NOTE
-short course of steroids planned  - patient refusing higher dose of steroids or IV steroids on admission  -Continue her on bronchodilators per RT protocol.   - Continue oxygen supplements, keep saturations above 88%.  Continue home dose Spiriva.  -Patient claims that her pulmonologist is calling her, but we have not received any calls from [facility].  Currently, there is no indication to transfer her to a higher level of care.  -spoke with Dr. Gonzalez, pt no current clinical decompensation will c/w current tx  -increased Xanax to qid   11/27:  Started taper prednisone 30 to 20mg po daily, home dose is 7 mg daily.

## 2018-11-20 NOTE — PROGRESS NOTES
Report received at ED bedside, patient care assumed. Transported on rachel riojas ACLS nurse. Tele box on, VSS. Patient resting in bed, updated on POC, no requests at this time. Fall precautions in place with bed in lowest position, treaded sock slippers on, and call light within reach.

## 2018-11-20 NOTE — PROGRESS NOTES
"Hospital Medicine Daily Progress Note    Date of Service  11/20/2018    Chief Complaint  58 y.o. female with history of liver transplant for cirrhosis, CKD stage III, diabetes mellitus, hypothyroidism, ILD, and pulmonary hypertension on home oxygen, with chronic pain syndrome, admitted 11/19/2018 with fevers, chills, productive cough, shortness of breath, and worsening abdominal pain since she was discharged a week ago following hernia repair.      Hospital Course    The patient was initially evaluated.  Abdominal CT showed no acute findings.  Labs showed stable pancytopenia, with unimpressive BMP.  Chest x-ray showed coarse bronchovascular markings, with no lobar pneumonia or overt edema.  Urinalysis was clean.  She was felt to have bronchitis, and was started on IV ceftriaxone and azithromycin.      Interval Problem Update  11/20/2018 - no overnight events. BP soft. Stable on 4L O2 NC.  Labs showed stable pancytopenia.     > I have personally seen and examined the patient today.  Constantly asking for narcotics, specifically asking for 4 mg of IV morphine every 2-3 hours.  Still with shortness of breath, and wheezing, along with cough.  No nausea, vomiting.  Complaining of pain in the abdomen, stating that it is from \"my scoliosis, and enlarged spleen\", and \"abdominal pain always flares up when I have issues with my lungs\".  Patient given goes to the point to ask for a PCA.      Consultants/Specialty  None    Code Status  Full    Disposition  Transfer to medical    Review of Systems  ROS     Pertinent positives/negatives as mentioned above.     A complete review of systems was personally done by me. All other systems were negative.       Physical Exam  Temp:  [36.4 °C (97.6 °F)-36.8 °C (98.3 °F)] 36.4 °C (97.6 °F)  Pulse:  [59-87] 64  Resp:  [16-25] 16  BP: ()/(44-78) 78/44    Physical Exam   Constitutional: She is oriented to person, place, and time. She appears well-developed and well-nourished. No " distress.   HENT:   Head: Normocephalic and atraumatic.   Mouth/Throat: No oropharyngeal exudate.   Eyes: Pupils are equal, round, and reactive to light. Conjunctivae are normal. No scleral icterus.   Neck: Normal range of motion. Neck supple.   Cardiovascular: Normal rate and regular rhythm.  Exam reveals no gallop and no friction rub.    No murmur heard.  Pulmonary/Chest: Effort normal. No respiratory distress. She exhibits no tenderness.   Diminished air entry B/L bases, with scattered rales and wheezes   Abdominal: Soft. Bowel sounds are normal. She exhibits no distension. There is no rebound and no guarding.   (+) Minimal abdominal tenderness, distractible. NABS.   Musculoskeletal: Normal range of motion. She exhibits no tenderness.   Lymphadenopathy:     She has no cervical adenopathy.   Neurological: She is alert and oriented to person, place, and time. No cranial nerve deficit.   Skin: Skin is warm and dry. No rash noted. She is not diaphoretic. No erythema. No pallor.   Psychiatric: Her behavior is normal.   Anxious, emotional.   Nursing note and vitals reviewed.           Fluids    Intake/Output Summary (Last 24 hours) at 11/20/18 0754  Last data filed at 11/20/18 0600   Gross per 24 hour   Intake             1389 ml   Output              400 ml   Net              989 ml       Laboratory  Recent Labs      11/19/18   1454  11/20/18   0322   WBC  2.8*  3.2*   RBC  4.05*  3.61*   HEMOGLOBIN  11.9*  10.8*   HEMATOCRIT  36.5*  33.4*   MCV  90.1  92.5   MCH  29.4  29.9   MCHC  32.6*  32.3*   RDW  47.2  48.4   PLATELETCT  86*  77*   MPV  9.5  9.5     Recent Labs      11/19/18   1454  11/20/18   0322   SODIUM  141  144   POTASSIUM  4.1  3.7   CHLORIDE  106  110   CO2  26  25   GLUCOSE  123*  82   BUN  13  11   CREATININE  0.99  1.08   CALCIUM  8.8  7.8*                   Imaging  CT-ABDOMEN-PELVIS W/O   Final Result      1.  Interval decrease in amount of free intraperitoneal air compared to prior examination  which is related to recent abdominal surgical procedure. Minimal free air remains.      2.  Small amount of fluid within the anterior abdomen deep to the abdominal wall just below the level of the left lobe the liver. This measures up to 1 cm in depth consistent with a postsurgical fluid collection.      3.  No evidence of bowel obstruction.      4.  Again seen cirrhotic appearance of the liver with dilated portal vein and splenomegaly consistent with portal hypertension.      5.  Stable low-density focus within the spleen.      6.  Prior cholecystectomy and gastric bypass procedure.      7.  Atelectasis within the lung bases bilaterally. There is minimal left pleural effusion.      8.  Small ventral hernias which contain fat.      DX-CHEST-PORTABLE (1 VIEW)   Final Result      1.  Coarse bronchovascular markings again noted.   2.  No lobar pneumonia or overt pulmonary edema.   3.  No significant change from prior exam.           Assessment/Plan  * Acute and chronic respiratory failure with hypoxia due to bronchitis, ILD flare (HCC)- (present on admission)   Assessment & Plan    -I will escalate her systemic steroids to IV Solu Medrol 6 2.5 mg every 12 hours, and continue her on bronchodilators per RT protocol.  Continue oxygen supplements, keep saturations above 88%.  Continue home dose Spiriva.  -Continue antibiotics with IV ceftriaxone, and azithromycin.  Check procalcitonin.     Bronchitis- (present on admission)   Assessment & Plan    -Continue IV ceftriaxone and azithromycin.  Check procalcitonin and trend.  Follow cultures.       Drug-seeking behavior, with narcotic dependence- (present on admission)   Assessment & Plan    -I will resume her home dose oxycodone, along with IV morphine 4mg every 4 hours PRN. Will not give her anything more or more frequently than that.     Chronic generalized abdominal pain- (present on admission)   Assessment & Plan    -Resume home dose oxycodone 10 mg  TID.  Continue IV  morphine, may give 4 mg every 4 hours PRN, but no more frequently than that. No acute issues/pathology on CT scan.  Suspect drug-seeking behavior on top of her chronic narcotic dependence, and chronic pain.     Pancytopenia (HCC)- (present on admission)   Assessment & Plan    -Due to liver transplant, and splenomegaly.  Monitor blood counts closely.     Status post liver transplant Dr. Canada ([facility])- (present on admission)   Assessment & Plan    - Continue outpatient medication with tacrolimus and mycophenolate.  Resume midodrine.          VTE prophylaxis: SCD

## 2018-11-20 NOTE — DIETARY
"Nutrition services: Day 1 of admit.  Roxana Cuba is a 58 y.o. female with admitting DX of CAP (community acquired pneumonia)  Hx of hernia surgery ~1 week ago, liver transplant at [facility] ~7 years ago, s/p gastric sleeve surgery at [facility] ~3 years ago, s/p Evon-en-Y surgery at [facility] last March, 35 lb weight loss since March, reduced PO intake r/t abdominal surgeries, multiple admissions to hospital, cirrhosis, CKD stage III, diabetes mellitus, hypothyroidism, chronic pain syndrome    Consult received for 35 lb weight loss since March 2018 r/t gastric bypass.   -pt reports voluntary weight loss since March 2018  -she is currently eating fairly well on Regular diet, but RD suspects mild eating disorder, as patient is very fixated on counting calories and restricting certain food and beverage, in spite of continued weight loss.  -she aims to consume four small meals per day and measures her plate diameter, prior to filling it with food selections, she states.  -RD reviewed healthy weight goals, as well as Plate Method for balanced nutrients from each food group; she is very receptive to education and verbalizes understanding.   -reviewed smoothie recipes to try at home under 400 kcal.   -abdominal xray showed no acute findings, per MD progress note today.       Assessment:  Height: 172.7 cm (5' 8\")  Weight: 63.7 kg (140 lb 6.9 oz)  Body mass index is 21.35 kg/m².  Diet/Intake: Regular     Evaluation:   1. % wt change x8 months = 20; severe loss (voluntary)  2. Labs and meds reviewed - pt states she takes Relistor daily d/t narcotic associated constipation and hx bowel impaction.  3. PO fluids + IVF meeting est fluid needs for adequate hydration.  4. Skin is intact.   5. +BM 11/19   6. On visual exam, patient noted to have prominent muscle wasting to temple region (slight depression), clavicle bone region (protrusion), dorsal hand region (slight depression), anterior thigh region (depression along thighs; very thin), " posterior calf region (slight firmness), interosseus spaces of forearms (marked depression).    Malnutrition Risk: Patient with chronic malnutrition in the context of chronic disease related malnutrition r/t hx gastric bypass, as evidenced by significant muscle loss, severe weight loss.     Recommendations/Plan:  1. Encourage intake of meals and snacks.   -change diet order to Diabetic, if POC BS becomes elevated.   2. Document intake of all meals and snacks  as % taken in ADL's to provide interdisciplinary communication across all shifts.   3. Monitor weight.  4. Nutrition rep will continue to see patient for ongoing meal and snack preferences.

## 2018-11-21 PROBLEM — E11.9 TYPE 2 DIABETES MELLITUS (HCC): Status: ACTIVE | Noted: 2018-11-21

## 2018-11-21 LAB
ANION GAP SERPL CALC-SCNC: 4 MMOL/L (ref 0–11.9)
ANISOCYTOSIS BLD QL SMEAR: ABNORMAL
BASOPHILS # BLD AUTO: 0 % (ref 0–1.8)
BASOPHILS # BLD: 0 K/UL (ref 0–0.12)
BUN SERPL-MCNC: 13 MG/DL (ref 8–22)
CALCIUM SERPL-MCNC: 8 MG/DL (ref 8.5–10.5)
CHLORIDE SERPL-SCNC: 108 MMOL/L (ref 96–112)
CO2 SERPL-SCNC: 25 MMOL/L (ref 20–33)
CREAT SERPL-MCNC: 1.22 MG/DL (ref 0.5–1.4)
EOSINOPHIL # BLD AUTO: 0.05 K/UL (ref 0–0.51)
EOSINOPHIL NFR BLD: 1.8 % (ref 0–6.9)
ERYTHROCYTE [DISTWIDTH] IN BLOOD BY AUTOMATED COUNT: 46.2 FL (ref 35.9–50)
GLUCOSE BLD-MCNC: 110 MG/DL (ref 65–99)
GLUCOSE BLD-MCNC: 191 MG/DL (ref 65–99)
GLUCOSE BLD-MCNC: 226 MG/DL (ref 65–99)
GLUCOSE BLD-MCNC: 85 MG/DL (ref 65–99)
GLUCOSE SERPL-MCNC: 307 MG/DL (ref 65–99)
HCT VFR BLD AUTO: 34.9 % (ref 37–47)
HGB BLD-MCNC: 11.7 G/DL (ref 12–16)
LYMPHOCYTES # BLD AUTO: 0.24 K/UL (ref 1–4.8)
LYMPHOCYTES NFR BLD: 7.9 % (ref 22–41)
MANUAL DIFF BLD: NORMAL
MCH RBC QN AUTO: 30.4 PG (ref 27–33)
MCHC RBC AUTO-ENTMCNC: 33.5 G/DL (ref 33.6–35)
MCV RBC AUTO: 90.6 FL (ref 81.4–97.8)
METAMYELOCYTES NFR BLD MANUAL: 0.9 %
MICROCYTES BLD QL SMEAR: ABNORMAL
MONOCYTES # BLD AUTO: 0.03 K/UL (ref 0–0.85)
MONOCYTES NFR BLD AUTO: 0.9 % (ref 0–13.4)
MORPHOLOGY BLD-IMP: NORMAL
NEUTROPHILS # BLD AUTO: 2.66 K/UL (ref 2–7.15)
NEUTROPHILS NFR BLD: 88.5 % (ref 44–72)
NRBC # BLD AUTO: 0 K/UL
NRBC BLD-RTO: 0 /100 WBC
OVALOCYTES BLD QL SMEAR: NORMAL
PLATELET # BLD AUTO: 85 K/UL (ref 164–446)
PLATELET BLD QL SMEAR: NORMAL
PMV BLD AUTO: 9.6 FL (ref 9–12.9)
POIKILOCYTOSIS BLD QL SMEAR: NORMAL
POTASSIUM SERPL-SCNC: 5 MMOL/L (ref 3.6–5.5)
PROCALCITONIN SERPL-MCNC: <0.05 NG/ML
RBC # BLD AUTO: 3.85 M/UL (ref 4.2–5.4)
RBC BLD AUTO: PRESENT
SODIUM SERPL-SCNC: 137 MMOL/L (ref 135–145)
WBC # BLD AUTO: 3 K/UL (ref 4.8–10.8)

## 2018-11-21 PROCEDURE — 82962 GLUCOSE BLOOD TEST: CPT

## 2018-11-21 PROCEDURE — 700102 HCHG RX REV CODE 250 W/ 637 OVERRIDE(OP): Performed by: INTERNAL MEDICINE

## 2018-11-21 PROCEDURE — 85027 COMPLETE CBC AUTOMATED: CPT

## 2018-11-21 PROCEDURE — 84145 PROCALCITONIN (PCT): CPT

## 2018-11-21 PROCEDURE — 700111 HCHG RX REV CODE 636 W/ 250 OVERRIDE (IP): Performed by: INTERNAL MEDICINE

## 2018-11-21 PROCEDURE — 770001 HCHG ROOM/CARE - MED/SURG/GYN PRIV*

## 2018-11-21 PROCEDURE — 80048 BASIC METABOLIC PNL TOTAL CA: CPT

## 2018-11-21 PROCEDURE — 700105 HCHG RX REV CODE 258: Performed by: INTERNAL MEDICINE

## 2018-11-21 PROCEDURE — 85007 BL SMEAR W/DIFF WBC COUNT: CPT

## 2018-11-21 PROCEDURE — A9270 NON-COVERED ITEM OR SERVICE: HCPCS | Performed by: INTERNAL MEDICINE

## 2018-11-21 PROCEDURE — 700102 HCHG RX REV CODE 250 W/ 637 OVERRIDE(OP): Performed by: HOSPITALIST

## 2018-11-21 PROCEDURE — 99233 SBSQ HOSP IP/OBS HIGH 50: CPT | Performed by: INTERNAL MEDICINE

## 2018-11-21 PROCEDURE — 36415 COLL VENOUS BLD VENIPUNCTURE: CPT

## 2018-11-21 RX ORDER — PREDNISONE 20 MG/1
20 TABLET ORAL DAILY
Status: DISCONTINUED | OUTPATIENT
Start: 2018-11-21 | End: 2018-11-23

## 2018-11-21 RX ORDER — DEXTROSE MONOHYDRATE 25 G/50ML
25 INJECTION, SOLUTION INTRAVENOUS
Status: DISCONTINUED | OUTPATIENT
Start: 2018-11-21 | End: 2018-11-28 | Stop reason: HOSPADM

## 2018-11-21 RX ORDER — INSULIN GLARGINE 100 [IU]/ML
30 INJECTION, SOLUTION SUBCUTANEOUS 2 TIMES DAILY
COMMUNITY
End: 2018-12-04

## 2018-11-21 RX ADMIN — TADALAFIL 20 MG: 20 TABLET ORAL at 20:22

## 2018-11-21 RX ADMIN — MORPHINE SULFATE 4 MG: 10 INJECTION INTRAVENOUS at 10:12

## 2018-11-21 RX ADMIN — OXYCODONE HYDROCHLORIDE 10 MG: 10 TABLET ORAL at 12:36

## 2018-11-21 RX ADMIN — LEVOTHYROXINE SODIUM 137 MCG: 137 TABLET ORAL at 05:07

## 2018-11-21 RX ADMIN — MORPHINE SULFATE 4 MG: 10 INJECTION INTRAVENOUS at 14:51

## 2018-11-21 RX ADMIN — OXYCODONE HYDROCHLORIDE AND ACETAMINOPHEN 500 MG: 500 TABLET ORAL at 05:08

## 2018-11-21 RX ADMIN — TACROLIMUS 1.5 MG: 1 CAPSULE ORAL at 17:42

## 2018-11-21 RX ADMIN — TRAZODONE HYDROCHLORIDE 100 MG: 100 TABLET ORAL at 20:21

## 2018-11-21 RX ADMIN — AZITHROMYCIN 500 MG: 250 TABLET, FILM COATED ORAL at 23:15

## 2018-11-21 RX ADMIN — Medication 81 MG: at 05:08

## 2018-11-21 RX ADMIN — MORPHINE SULFATE 4 MG: 10 INJECTION INTRAVENOUS at 18:38

## 2018-11-21 RX ADMIN — BISACODYL 10 MG: 10 SUPPOSITORY RECTAL at 09:25

## 2018-11-21 RX ADMIN — OXYCODONE HYDROCHLORIDE 10 MG: 10 TABLET ORAL at 05:08

## 2018-11-21 RX ADMIN — MORPHINE SULFATE 4 MG: 10 INJECTION INTRAVENOUS at 01:52

## 2018-11-21 RX ADMIN — PRAVASTATIN SODIUM 20 MG: 20 TABLET ORAL at 05:07

## 2018-11-21 RX ADMIN — FUROSEMIDE 20 MG: 20 TABLET ORAL at 05:07

## 2018-11-21 RX ADMIN — MYCOPHENOLATE MOFETIL 500 MG: 250 CAPSULE ORAL at 05:07

## 2018-11-21 RX ADMIN — PREDNISONE 20 MG: 20 TABLET ORAL at 11:36

## 2018-11-21 RX ADMIN — GABAPENTIN 600 MG: 300 CAPSULE ORAL at 17:41

## 2018-11-21 RX ADMIN — MORPHINE SULFATE 4 MG: 10 INJECTION INTRAVENOUS at 20:20

## 2018-11-21 RX ADMIN — AMBRISENTAN 10 MG: 10 TABLET, FILM COATED ORAL at 06:00

## 2018-11-21 RX ADMIN — TIOTROPIUM BROMIDE 1 CAPSULE: 18 CAPSULE ORAL; RESPIRATORY (INHALATION) at 05:26

## 2018-11-21 RX ADMIN — TACROLIMUS 1.5 MG: 1 CAPSULE ORAL at 05:07

## 2018-11-21 RX ADMIN — ALPRAZOLAM 1 MG: 1 TABLET ORAL at 17:41

## 2018-11-21 RX ADMIN — METHYLNALTREXONE BROMIDE 12 MG: 12 INJECTION, SOLUTION SUBCUTANEOUS at 09:24

## 2018-11-21 RX ADMIN — OMEPRAZOLE 40 MG: 20 CAPSULE, DELAYED RELEASE ORAL at 05:07

## 2018-11-21 RX ADMIN — ALPRAZOLAM 1 MG: 1 TABLET ORAL at 05:08

## 2018-11-21 RX ADMIN — GABAPENTIN 600 MG: 300 CAPSULE ORAL at 12:36

## 2018-11-21 RX ADMIN — INSULIN HUMAN 4 UNITS: 100 INJECTION, SOLUTION PARENTERAL at 04:47

## 2018-11-21 RX ADMIN — MYCOPHENOLATE MOFETIL 500 MG: 250 CAPSULE ORAL at 17:42

## 2018-11-21 RX ADMIN — ONDANSETRON 4 MG: 4 TABLET, ORALLY DISINTEGRATING ORAL at 10:18

## 2018-11-21 RX ADMIN — SERTRALINE 100 MG: 100 TABLET, FILM COATED ORAL at 20:21

## 2018-11-21 RX ADMIN — MORPHINE SULFATE 4 MG: 10 INJECTION INTRAVENOUS at 06:16

## 2018-11-21 RX ADMIN — CALCIUM POLYCARBOPHIL 625 MG: 625 TABLET, FILM COATED ORAL at 11:36

## 2018-11-21 RX ADMIN — CEFTRIAXONE SODIUM 2 G: 2 INJECTION, POWDER, FOR SOLUTION INTRAMUSCULAR; INTRAVENOUS at 05:09

## 2018-11-21 RX ADMIN — GABAPENTIN 600 MG: 300 CAPSULE ORAL at 05:08

## 2018-11-21 RX ADMIN — INSULIN HUMAN 2 UNITS: 100 INJECTION, SOLUTION PARENTERAL at 17:48

## 2018-11-21 RX ADMIN — OXYCODONE HYDROCHLORIDE 10 MG: 10 TABLET ORAL at 17:41

## 2018-11-21 RX ADMIN — ALPRAZOLAM 1 MG: 1 TABLET ORAL at 12:36

## 2018-11-21 ASSESSMENT — PAIN SCALES - GENERAL
PAINLEVEL_OUTOF10: 8

## 2018-11-21 NOTE — DISCHARGE PLANNING
Anticipated Discharge Disposition: Unknown     Action: LSW received call from house PASTOR regarding Pt requesting met with PASTOR and NAM.  Pt at bedside regarding request made to transfer to [facility], Pt advised she has been informed options for transfer as acute to acute transfer would not be urgent/emgent status. Pt become upset and advised she did not wish to speak with LSW.      Barriers to Discharge: medical clearance     Plan: Pt to discharge home when medically cleared

## 2018-11-21 NOTE — PROGRESS NOTES
"PTA Medications ordered and cleared with patient.    Scheduled oxycodone and PRN morphine given for pain at this time. Patient remains extremely upset \"with [facility].\"  RN continues to give 1:1 support to patient, updating her on plan of care throughout the day.  "

## 2018-11-21 NOTE — PROGRESS NOTES
Report received at bedside, patient care assumed. Tele box on. Patient eating dinner. Complains of cough, will medicate per MAR. Updated on POC, evening med schedule, and pain management. Patient verbalized understanding and has no other requests at this time. Fall precautions in place with bed in lowest position, treaded sock slippers on, and call light within reach.

## 2018-11-21 NOTE — CARE PLAN
Problem: Safety  Goal: Will remain free from injury  Outcome: PROGRESSING AS EXPECTED  Educated patient on use of call light, no slip socks on, bed lowest position. All needs attended to. Patient verbalized understanding.     Problem: Psychosocial Needs:  Goal: Level of anxiety will decrease  Outcome: PROGRESSING AS EXPECTED  Updated patient on POC and asked for her input, explained medications especially those related to the management of patient's chronic pain, addressed questions and requests. Provided positive encouragement to help reduce patient anxiety related to current hospitalization. Reinforced call light use if she has any further needs.

## 2018-11-21 NOTE — PROGRESS NOTES
Hospital Medicine Daily Progress Note    Date of Service  11/21/2018    Chief Complaint  58 y.o. female with history of liver transplant for cirrhosis, CKD stage III, diabetes mellitus, hypothyroidism, ILD, and pulmonary hypertension on home oxygen, with chronic pain syndrome, admitted 11/19/2018 with fevers, chills, productive cough, shortness of breath, and worsening abdominal pain since she was discharged a week ago following hernia repair.      Hospital Course    The patient was initially evaluated.  Abdominal CT showed no acute findings.  Labs showed stable pancytopenia, with unimpressive BMP.  Chest x-ray showed coarse bronchovascular markings, with no lobar pneumonia or overt edema.  Urinalysis was clean.  She was felt to have bronchitis, and was started on IV ceftriaxone and azithromycin.  She had significant wheezing, which was felt to be related to her ILD flare.  She was started on IV steroids.  She was suspected of drug-seeking behavior, she was constantly asking for IV narcotics.      Interval Problem Update  11/21/2018 - uneventful night. VSS. Afebrile. Saturating well on 4L O2 NC. Has stable pancytopenia.  Creatinine 1.22.  Sodium potassium normal.  Procalcitonin low x2. .    > I have personally seen and examined the patient today.  She is very anxious.  States that she still feels crappy, although she sounds better.  Still has cough, nonproductive.  Denied any nausea, vomiting, abdominal pain, diarrhea.  Refusing to take IV Solu-Medrol, but willing to take oral prednisone.  Patient claims that his pulmonologist in [facility] has been calling her, and recommending that she be transferred to [facility].  We have not received any calls from his pulmonologist or any request for transfers.      Consultants/Specialty  None    Code Status  Full    Disposition  Transfer to medical    Review of Systems  ROS     Pertinent positives/negatives as mentioned above.     A complete review of systems was personally done  by me. All other systems were negative.       Physical Exam  Temp:  [36.2 °C (97.2 °F)-37.1 °C (98.8 °F)] 36.2 °C (97.2 °F)  Pulse:  [54-74] 54  Resp:  [17-18] 17  BP: ()/(48-80) 84/48    Physical Exam   Constitutional: She is oriented to person, place, and time. She appears well-developed and well-nourished. No distress.   HENT:   Head: Normocephalic and atraumatic.   Mouth/Throat: No oropharyngeal exudate.   Eyes: Pupils are equal, round, and reactive to light. Conjunctivae are normal. No scleral icterus.   Neck: Normal range of motion. Neck supple.   Cardiovascular: Normal rate and regular rhythm.  Exam reveals no gallop and no friction rub.    No murmur heard.  Pulmonary/Chest: Effort normal and breath sounds normal. No respiratory distress. She has no wheezes. She has no rales. She exhibits no tenderness.   Improved scattered wheezing, with diminished air entry on both bases.   Abdominal: Soft. Bowel sounds are normal. She exhibits no distension. There is tenderness (Minimal abdominal tenderness.  Very distractible.). There is no rebound and no guarding.   Musculoskeletal: Normal range of motion. She exhibits no edema or tenderness.   Lymphadenopathy:     She has no cervical adenopathy.   Neurological: She is alert and oriented to person, place, and time. No cranial nerve deficit.   Skin: Skin is warm and dry. No rash noted. No erythema. No pallor.   Psychiatric:   Very distractible, and anxious.    Scattered thoughts  Impaired judgment   Nursing note and vitals reviewed.         Fluids    Intake/Output Summary (Last 24 hours) at 11/21/18 0750  Last data filed at 11/20/18 2000   Gross per 24 hour   Intake              240 ml   Output              550 ml   Net             -310 ml       Laboratory  Recent Labs      11/19/18   1454  11/20/18   0322  11/21/18   0046   WBC  2.8*  3.2*  3.0*   RBC  4.05*  3.61*  3.85*   HEMOGLOBIN  11.9*  10.8*  11.7*   HEMATOCRIT  36.5*  33.4*  34.9*   MCV  90.1  92.5  90.6    MCH  29.4  29.9  30.4   MCHC  32.6*  32.3*  33.5*   RDW  47.2  48.4  46.2   PLATELETCT  86*  77*  85*   MPV  9.5  9.5  9.6     Recent Labs      11/19/18   1454  11/20/18   0322  11/21/18   0046   SODIUM  141  144  137   POTASSIUM  4.1  3.7  5.0   CHLORIDE  106  110  108   CO2  26  25  25   GLUCOSE  123*  82  307*   BUN  13  11  13   CREATININE  0.99  1.08  1.22   CALCIUM  8.8  7.8*  8.0*                   Imaging  CT-ABDOMEN-PELVIS W/O   Final Result      1.  Interval decrease in amount of free intraperitoneal air compared to prior examination which is related to recent abdominal surgical procedure. Minimal free air remains.      2.  Small amount of fluid within the anterior abdomen deep to the abdominal wall just below the level of the left lobe the liver. This measures up to 1 cm in depth consistent with a postsurgical fluid collection.      3.  No evidence of bowel obstruction.      4.  Again seen cirrhotic appearance of the liver with dilated portal vein and splenomegaly consistent with portal hypertension.      5.  Stable low-density focus within the spleen.      6.  Prior cholecystectomy and gastric bypass procedure.      7.  Atelectasis within the lung bases bilaterally. There is minimal left pleural effusion.      8.  Small ventral hernias which contain fat.      DX-CHEST-PORTABLE (1 VIEW)   Final Result      1.  Coarse bronchovascular markings again noted.   2.  No lobar pneumonia or overt pulmonary edema.   3.  No significant change from prior exam.           Assessment/Plan  * Acute and chronic respiratory failure with hypoxia due to bronchitis, ILD flare (HCC)- (present on admission)   Assessment & Plan    -Improved wheezing.  I will taper her down to prednisone 20 mg daily, as patient refusing higher dose of steroids. Continue her on bronchodilators per RT protocol.  Continue oxygen supplements, keep saturations above 88%.  Continue home dose Spiriva.  -Patient claims that her pulmonologist is calling  "her, but [facility].  Currently, there is no indication to transfer her to a higher level of care.  -Continue antibiotics with IV ceftriaxone, and azithromycin.  Complete 5 days course of antibiotics. Continue to trend procalcitonin.     Bronchitis- (present on admission)   Assessment & Plan    -Continue IV ceftriaxone and azithromycin, complete 5 days course of antibiotics as initial procalcitonin is low.  Continue to trend procalcitonin.       Drug-seeking behavior, with narcotic dependence- (present on admission)   Assessment & Plan    -Continue home dose oxycodone, along with IV morphine 4mg every 4 hours PRN. Will not give her anything more or more frequently than that.     Type 2 diabetes mellitus (HCC)- (present on admission)   Assessment & Plan    -Allegedly was \"cured\" with weight loss.  Has steroid hyperglycemia.   -Start sliding scale insulin coverage.  Anticipate blood sugar will improve with transition to oral prednisone.  Accu-Chek before meals and at bedtime.     Chronic generalized abdominal pain- (present on admission)   Assessment & Plan    -Continue home dose oxycodone 10 mg  TID.  Continue IV morphine 4 mg every 4 hours PRN.  I highly suspect drug-seeking behavior.  Do not give more narcotics. No acute issues/pathology on CT scan.       Pancytopenia (HCC)- (present on admission)   Assessment & Plan    -Due to liver transplant, and splenomegaly.  Stable. Monitor blood counts closely.     Status post liver transplant Dr. Canada ([facility])- (present on admission)   Assessment & Plan    - Continue outpatient medication with tacrolimus and mycophenolate.  Continue midodrine.          VTE prophylaxis: SCD      "

## 2018-11-21 NOTE — ASSESSMENT & PLAN NOTE
"-Allegedly was \"cured\" with weight loss.  Has steroid hyperglycemia.   -Start sliding scale insulin coverage.  Anticipate blood sugar will improve with transition to oral prednisone.  Accu-Chek before meals and at bedtime.  "

## 2018-11-21 NOTE — PROGRESS NOTES
"Patient called Charge RN and NAM - both parties discussed situation with patient at length.     Patient apologized to this RN at end of shift, explaining that she feels she is \"more emotional\" after her liver transplant 7 years ago.     Discussed plan of care tomorrow and that her Pulmonologist at [facility] should reach out to MD hospitalist here to discuss care further and a possible transfer to [facility] if that is medically necessary.   "

## 2018-11-21 NOTE — PROGRESS NOTES
Bedside report received. Patient very upset regarding home medications that were not ordered by Hospitalist service in ED on admission.    Sat with patient for approx 30 minutes discussing home medications that needed to be ordered.     C/o 10/10 abdominal pain, extremely anxious and tearful. MD Hospitalist paged to bedside to hear patient concerns.

## 2018-11-21 NOTE — PROGRESS NOTES
"RN received a call from [facility] transfer service explaining that patient wants to be transferred to [facility].     RN at bedside to discuss decision with patient who \"[facility] Explained that she does not trust the medical staff here after having numerous admissions where she felt her needs were not met.     RN discussed this choice with MD Hospitalist. Per hospitalist at this point there is no medical reason for patient to be transferred to [facility]. Informed patient of this fact and explained financial implications/option of of leaving AMA. Did explain that we could discuss possibility of discharge with hospitalist and patient could go herself with her  to [facility] as her pulmonologist is there. Patient replied that if she left now she would \"most likely die\" on the way to [facility] due to the change in altitude during the car ride.     Patient became very upset, verbally aggressive. Escalated situation to Charge RN and Nurse Manager.           "

## 2018-11-21 NOTE — PROGRESS NOTES
"Patient has orders to transfer to medical floor - telemetry monitor removed per order on 11/20. [facility] able to take patient. Patient refusing to transfer to a semi-private room - explained to RN that due to her \"immunocompromised condition\" she is unable to be in a semi-private room.    Bed control notified, attempting to find a private room for patient on medical floor.   "

## 2018-11-21 NOTE — PROGRESS NOTES
Bedside report received. Patient resting in bed, c/o 8/10 pain to the adbomen, worse when coughing. Requesting PRN cough medicine and pain medicine.    Given meds as per MAR.

## 2018-11-21 NOTE — PROGRESS NOTES
RN with MD Hospitalist at bedside for morning rounds. Patient discussed concerns with hospitalist. RN suggested patient's pulmonologist at [facility] to call Hospitalist and have a discussion so we are aware of her concerns. Patient reported that she will let pulmonologist know to do this if they call her back today.     Patient very agreeable to the plan at this time. Will continue to monitor and attend to patient needs.

## 2018-11-22 ENCOUNTER — APPOINTMENT (OUTPATIENT)
Dept: RADIOLOGY | Facility: MEDICAL CENTER | Age: 58
DRG: 196 | End: 2018-11-22
Attending: HOSPITALIST
Payer: MEDICARE

## 2018-11-22 LAB
ANION GAP SERPL CALC-SCNC: 7 MMOL/L (ref 0–11.9)
BASOPHILS # BLD AUTO: 0.5 % (ref 0–1.8)
BASOPHILS # BLD: 0.02 K/UL (ref 0–0.12)
BUN SERPL-MCNC: 14 MG/DL (ref 8–22)
CALCIUM SERPL-MCNC: 8.5 MG/DL (ref 8.5–10.5)
CHLORIDE SERPL-SCNC: 111 MMOL/L (ref 96–112)
CO2 SERPL-SCNC: 22 MMOL/L (ref 20–33)
CREAT SERPL-MCNC: 0.91 MG/DL (ref 0.5–1.4)
EOSINOPHIL # BLD AUTO: 0.09 K/UL (ref 0–0.51)
EOSINOPHIL NFR BLD: 2.1 % (ref 0–6.9)
ERYTHROCYTE [DISTWIDTH] IN BLOOD BY AUTOMATED COUNT: 49.1 FL (ref 35.9–50)
GLUCOSE BLD-MCNC: 133 MG/DL (ref 65–99)
GLUCOSE BLD-MCNC: 94 MG/DL (ref 65–99)
GLUCOSE SERPL-MCNC: 197 MG/DL (ref 65–99)
HCT VFR BLD AUTO: 37.8 % (ref 37–47)
HGB BLD-MCNC: 11.8 G/DL (ref 12–16)
IMM GRANULOCYTES # BLD AUTO: 0.07 K/UL (ref 0–0.11)
IMM GRANULOCYTES NFR BLD AUTO: 1.6 % (ref 0–0.9)
LYMPHOCYTES # BLD AUTO: 0.93 K/UL (ref 1–4.8)
LYMPHOCYTES NFR BLD: 21.9 % (ref 22–41)
MCH RBC QN AUTO: 29.5 PG (ref 27–33)
MCHC RBC AUTO-ENTMCNC: 31.2 G/DL (ref 33.6–35)
MCV RBC AUTO: 94.5 FL (ref 81.4–97.8)
MONOCYTES # BLD AUTO: 0.2 K/UL (ref 0–0.85)
MONOCYTES NFR BLD AUTO: 4.7 % (ref 0–13.4)
NEUTROPHILS # BLD AUTO: 2.94 K/UL (ref 2–7.15)
NEUTROPHILS NFR BLD: 69.2 % (ref 44–72)
NRBC # BLD AUTO: 0.02 K/UL
NRBC BLD-RTO: 0.5 /100 WBC
PLATELET # BLD AUTO: 89 K/UL (ref 164–446)
PMV BLD AUTO: 9.5 FL (ref 9–12.9)
POTASSIUM SERPL-SCNC: 4.3 MMOL/L (ref 3.6–5.5)
PROCALCITONIN SERPL-MCNC: <0.05 NG/ML
RBC # BLD AUTO: 4 M/UL (ref 4.2–5.4)
SODIUM SERPL-SCNC: 140 MMOL/L (ref 135–145)
WBC # BLD AUTO: 4.3 K/UL (ref 4.8–10.8)

## 2018-11-22 PROCEDURE — 770001 HCHG ROOM/CARE - MED/SURG/GYN PRIV*

## 2018-11-22 PROCEDURE — 700111 HCHG RX REV CODE 636 W/ 250 OVERRIDE (IP): Performed by: INTERNAL MEDICINE

## 2018-11-22 PROCEDURE — 84145 PROCALCITONIN (PCT): CPT

## 2018-11-22 PROCEDURE — 80048 BASIC METABOLIC PNL TOTAL CA: CPT

## 2018-11-22 PROCEDURE — 36415 COLL VENOUS BLD VENIPUNCTURE: CPT

## 2018-11-22 PROCEDURE — 700102 HCHG RX REV CODE 250 W/ 637 OVERRIDE(OP): Performed by: HOSPITALIST

## 2018-11-22 PROCEDURE — 99233 SBSQ HOSP IP/OBS HIGH 50: CPT | Performed by: HOSPITALIST

## 2018-11-22 PROCEDURE — A9270 NON-COVERED ITEM OR SERVICE: HCPCS | Performed by: INTERNAL MEDICINE

## 2018-11-22 PROCEDURE — 85025 COMPLETE CBC W/AUTO DIFF WBC: CPT

## 2018-11-22 PROCEDURE — 700105 HCHG RX REV CODE 258: Performed by: INTERNAL MEDICINE

## 2018-11-22 PROCEDURE — 700102 HCHG RX REV CODE 250 W/ 637 OVERRIDE(OP): Performed by: INTERNAL MEDICINE

## 2018-11-22 PROCEDURE — 82962 GLUCOSE BLOOD TEST: CPT

## 2018-11-22 PROCEDURE — A9270 NON-COVERED ITEM OR SERVICE: HCPCS | Performed by: HOSPITALIST

## 2018-11-22 PROCEDURE — 71250 CT THORAX DX C-: CPT

## 2018-11-22 RX ORDER — ALPRAZOLAM 1 MG/1
1 TABLET ORAL 4 TIMES DAILY
Status: DISCONTINUED | OUTPATIENT
Start: 2018-11-22 | End: 2018-11-28 | Stop reason: HOSPADM

## 2018-11-22 RX ADMIN — STANDARDIZED SENNA CONCENTRATE AND DOCUSATE SODIUM 2 TABLET: 8.6; 5 TABLET, FILM COATED ORAL at 06:00

## 2018-11-22 RX ADMIN — MYCOPHENOLATE MOFETIL 500 MG: 250 CAPSULE ORAL at 06:24

## 2018-11-22 RX ADMIN — ALPRAZOLAM 1 MG: 1 TABLET ORAL at 20:23

## 2018-11-22 RX ADMIN — MORPHINE SULFATE 4 MG: 10 INJECTION INTRAVENOUS at 01:32

## 2018-11-22 RX ADMIN — SERTRALINE 100 MG: 100 TABLET, FILM COATED ORAL at 20:24

## 2018-11-22 RX ADMIN — MORPHINE SULFATE 4 MG: 10 INJECTION INTRAVENOUS at 22:38

## 2018-11-22 RX ADMIN — LEVOTHYROXINE SODIUM 137 MCG: 137 TABLET ORAL at 06:26

## 2018-11-22 RX ADMIN — AZITHROMYCIN 500 MG: 250 TABLET, FILM COATED ORAL at 20:23

## 2018-11-22 RX ADMIN — PRAVASTATIN SODIUM 20 MG: 20 TABLET ORAL at 06:24

## 2018-11-22 RX ADMIN — OXYCODONE HYDROCHLORIDE 10 MG: 10 TABLET ORAL at 11:55

## 2018-11-22 RX ADMIN — TRAZODONE HYDROCHLORIDE 100 MG: 100 TABLET ORAL at 22:38

## 2018-11-22 RX ADMIN — STANDARDIZED SENNA CONCENTRATE AND DOCUSATE SODIUM 2 TABLET: 8.6; 5 TABLET, FILM COATED ORAL at 16:24

## 2018-11-22 RX ADMIN — ALPRAZOLAM 1 MG: 1 TABLET ORAL at 12:05

## 2018-11-22 RX ADMIN — OXYCODONE HYDROCHLORIDE AND ACETAMINOPHEN 500 MG: 500 TABLET ORAL at 06:26

## 2018-11-22 RX ADMIN — GABAPENTIN 600 MG: 300 CAPSULE ORAL at 06:26

## 2018-11-22 RX ADMIN — BISACODYL 20 MG: 10 SUPPOSITORY RECTAL at 06:36

## 2018-11-22 RX ADMIN — OXYCODONE HYDROCHLORIDE 10 MG: 10 TABLET ORAL at 16:24

## 2018-11-22 RX ADMIN — OXYCODONE HYDROCHLORIDE 10 MG: 10 TABLET ORAL at 06:25

## 2018-11-22 RX ADMIN — ALPRAZOLAM 1 MG: 1 TABLET ORAL at 06:26

## 2018-11-22 RX ADMIN — MORPHINE SULFATE 4 MG: 10 INJECTION INTRAVENOUS at 13:48

## 2018-11-22 RX ADMIN — TADALAFIL 20 MG: 20 TABLET ORAL at 20:24

## 2018-11-22 RX ADMIN — GUAIFENESIN AND DEXTROMETHORPHAN 5 ML: 100; 10 SYRUP ORAL at 11:56

## 2018-11-22 RX ADMIN — Medication 81 MG: at 06:27

## 2018-11-22 RX ADMIN — TACROLIMUS 1.5 MG: 1 CAPSULE ORAL at 06:26

## 2018-11-22 RX ADMIN — GABAPENTIN 600 MG: 300 CAPSULE ORAL at 16:23

## 2018-11-22 RX ADMIN — CEFTRIAXONE SODIUM 2 G: 2 INJECTION, POWDER, FOR SOLUTION INTRAMUSCULAR; INTRAVENOUS at 06:21

## 2018-11-22 RX ADMIN — CALCIUM POLYCARBOPHIL 625 MG: 625 TABLET, FILM COATED ORAL at 06:24

## 2018-11-22 RX ADMIN — OMEPRAZOLE 40 MG: 20 CAPSULE, DELAYED RELEASE ORAL at 06:24

## 2018-11-22 RX ADMIN — SODIUM CHLORIDE: 9 INJECTION, SOLUTION INTRAVENOUS at 06:21

## 2018-11-22 RX ADMIN — ALPRAZOLAM 1 MG: 1 TABLET ORAL at 16:24

## 2018-11-22 RX ADMIN — TIOTROPIUM BROMIDE 1 CAPSULE: 18 CAPSULE ORAL; RESPIRATORY (INHALATION) at 06:29

## 2018-11-22 RX ADMIN — BISACODYL 5 MG: 5 TABLET, COATED ORAL at 06:00

## 2018-11-22 RX ADMIN — GUAIFENESIN AND DEXTROMETHORPHAN 5 ML: 100; 10 SYRUP ORAL at 06:25

## 2018-11-22 RX ADMIN — FUROSEMIDE 20 MG: 20 TABLET ORAL at 06:26

## 2018-11-22 RX ADMIN — GABAPENTIN 600 MG: 300 CAPSULE ORAL at 11:56

## 2018-11-22 RX ADMIN — MORPHINE SULFATE 4 MG: 10 INJECTION INTRAVENOUS at 10:04

## 2018-11-22 RX ADMIN — MORPHINE SULFATE 4 MG: 10 INJECTION INTRAVENOUS at 06:22

## 2018-11-22 RX ADMIN — AMBRISENTAN 10 MG: 10 TABLET, FILM COATED ORAL at 06:28

## 2018-11-22 RX ADMIN — METHYLNALTREXONE BROMIDE 12 MG: 12 INJECTION, SOLUTION SUBCUTANEOUS at 06:27

## 2018-11-22 RX ADMIN — GUAIFENESIN AND DEXTROMETHORPHAN 5 ML: 100; 10 SYRUP ORAL at 01:35

## 2018-11-22 RX ADMIN — MORPHINE SULFATE 4 MG: 10 INJECTION INTRAVENOUS at 17:52

## 2018-11-22 RX ADMIN — PREDNISONE 20 MG: 20 TABLET ORAL at 06:25

## 2018-11-22 ASSESSMENT — ENCOUNTER SYMPTOMS
WHEEZING: 1
BLOOD IN STOOL: 0
DIARRHEA: 0
HEARTBURN: 0
FOCAL WEAKNESS: 0
CONSTIPATION: 0
SHORTNESS OF BREATH: 1
SORE THROAT: 0
HEMOPTYSIS: 0
VOMITING: 0
DIZZINESS: 0
SINUS PAIN: 0
HEADACHES: 0
COUGH: 1
SPUTUM PRODUCTION: 1
PALPITATIONS: 0
ABDOMINAL PAIN: 1
NAUSEA: 0

## 2018-11-22 ASSESSMENT — PAIN SCALES - GENERAL
PAINLEVEL_OUTOF10: 9
PAINLEVEL_OUTOF10: 6
PAINLEVEL_OUTOF10: 8
PAINLEVEL_OUTOF10: 0
PAINLEVEL_OUTOF10: 8

## 2018-11-22 NOTE — CARE PLAN
Problem: Pain Management  Goal: Pain level will decrease to patient's comfort goal  Outcome: PROGRESSING AS EXPECTED  IV moprhine PRN administered per MAR for pain. Pain assessed using 0-10 pain scale. No PO pain medication on MAR, will discuss with day team.     Problem: Respiratory:  Goal: Respiratory status will improve  Outcome: PROGRESSING AS EXPECTED  Patient maintaining O2 sats at baseline O2 demand between 4-6 L. Humidifier in place.     Problem: Psychosocial Needs:  Goal: Level of anxiety will decrease  Outcome: PROGRESSING AS EXPECTED  Patient very anxious and tearful regarding care. All questions answered, emotional comfort and distraction successfully implemented. Established rapport with patient.

## 2018-11-22 NOTE — PROGRESS NOTES
Patient arrived to unit, IV infiltrated before transfer, patient upset and crying in bed. Reassured patient we would obtain a new IV ASAP.   2 RN skin check completed. No signs of breakdown noted, generalized bruising from IVs.

## 2018-11-22 NOTE — PROGRESS NOTES
"[facility] Hospitalist Progress Note    Date of Service: 11/22/2018    Chief Complaint  58 y.o. female admitted 11/19/2018 with cough, wheezing and shortness of breath.  Pt has long medical hx including ILD reportedly currently treated by pulmonologist at [facility], s/p gastric bypass, chronic immunosuppression s/p liver transplant, chronic pain syndrome.  On presentation, CXR and clinical presentation indicative of possible bronchitis vs ILD/sarcoidosis \"flare\" so patient started on Roceph/Azithromycin as well as prednisone (refused IV steroids).  Chart review indicates drug seeking bahavior as well.      Pt no clinical decompensation since hospitalization.  Pt has been seen by pulmnologist and discussion held between, [facility] pul and [facility] pulm.  Agreed that patient doing ok and that no transfer is necessary as of right now although patient is disagreement.      Infectious disease who knows patient well, recommends that Rocephin/Azithromycin x 5 days is sufficient since no airspace infiltrate obvious on CXR.      Interval Problem Update  11/22:  Pt very upset about current care.  Reach out to pulm, Dr. Gonzalez, this AM.  Patient wants to be transferred to [facility] pulmonologist.  CT chest ordered.      +Coughing, labile, frustrated.  Baseline oxygen 4L.  No f/c/n/v.  +abd pain that is a chronic issue.      Consultants/Specialty  none    Disposition  Pending clinical course        Review of Systems   Constitutional: Positive for malaise/fatigue.   HENT: Negative for sinus pain and sore throat.    Respiratory: Positive for cough, sputum production, shortness of breath and wheezing. Negative for hemoptysis.    Cardiovascular: Negative for chest pain, palpitations and leg swelling.   Gastrointestinal: Positive for abdominal pain. Negative for blood in stool, constipation, diarrhea, heartburn, nausea and vomiting.   Neurological: Negative for dizziness, focal weakness and headaches.      Physical Exam  Laboratory/Imaging "   Hemodynamics  Temp (24hrs), Av.6 °C (97.8 °F), Min:36.2 °C (97.2 °F), Max:36.8 °C (98.2 °F)   Temperature: 36.7 °C (98 °F)  Pulse  Av.8  Min: 50  Max: 87    Blood Pressure: 100/61      Respiratory      Respiration: 18, Pulse Oximetry: 95 %     Work Of Breathing / Effort: Mild  RUL Breath Sounds: Clear, RML Breath Sounds: Clear, RLL Breath Sounds: Clear, MANJINDER Breath Sounds: Clear, LLL Breath Sounds: Clear    Fluids    Intake/Output Summary (Last 24 hours) at 18 0834  Last data filed at 18 0600   Gross per 24 hour   Intake              880 ml   Output                0 ml   Net              880 ml       Nutrition  Orders Placed This Encounter   Procedures   • Diet Order Regular     Standing Status:   Standing     Number of Occurrences:   1     Order Specific Question:   Diet:     Answer:   Regular [1]     Physical Exam   Constitutional: She is oriented to person, place, and time. She appears well-developed and well-nourished.   HENT:   Head: Normocephalic and atraumatic.   Eyes: Pupils are equal, round, and reactive to light. Conjunctivae are normal.   Cardiovascular: Regular rhythm.    Pulmonary/Chest: Effort normal. No respiratory distress. She has wheezes. She has no rales. She exhibits no tenderness.   Coarse   Abdominal: Soft. She exhibits distension and mass. There is tenderness. There is guarding.   Musculoskeletal: Normal range of motion. She exhibits no edema or tenderness.   Neurological: She is alert and oriented to person, place, and time. A cranial nerve deficit is present.   Psychiatric: Her behavior is normal. Thought content normal.   Labile  crying   Nursing note and vitals reviewed.      Recent Labs      18   0322  18   0046  18   0141   WBC  3.2*  3.0*  4.3*   RBC  3.61*  3.85*  4.00*   HEMOGLOBIN  10.8*  11.7*  11.8*   HEMATOCRIT  33.4*  34.9*  37.8   MCV  92.5  90.6  94.5   MCH  29.9  30.4  29.5   MCHC  32.3*  33.5*  31.2*   RDW  48.4  46.2  49.1   PLATELETCT  " 77*  85*  89*   MPV  9.5  9.6  9.5     Recent Labs      11/20/18   0322  11/21/18   0046  11/22/18   0141   SODIUM  144  137  140   POTASSIUM  3.7  5.0  4.3   CHLORIDE  110  108  111   CO2  25  25  22   GLUCOSE  82  307*  197*   BUN  11  13  14   CREATININE  1.08  1.22  0.91   CALCIUM  7.8*  8.0*  8.5                      Assessment/Plan     * Acute and chronic respiratory failure with hypoxia due to bronchitis, ILD flare (HCC)- (present on admission)   Assessment & Plan    -short course of steroids planned  - patient refusing higher dose of steroids or IV steroids on admission  -Continue her on bronchodilators per RT protocol.   - Continue oxygen supplements, keep saturations above 88%.  Continue home dose Spiriva.  -Patient claims that her pulmonologist is calling her, but we have not received any calls from [facility].  Currently, there is no indication to transfer her to a higher level of care.       Bronchitis- (present on admission)   Assessment & Plan    -Continue IV ceftriaxone and azithromycin, d2  -plan to complete 5 days course of antibiotics  -continue w/ duonebs  -wean oxygen to maintain oxygen saturation between 88-92%       Drug-seeking behavior, with narcotic dependence- (present on admission)   Assessment & Plan    -Continue home dose oxycodone, along with IV morphine 4mg every 4 hours PRN. Will not give her anything more or more frequently than that.     Type 2 diabetes mellitus (HCC)- (present on admission)   Assessment & Plan    -Allegedly was \"cured\" with weight loss.  Has steroid hyperglycemia.   -Start sliding scale insulin coverage.  Anticipate blood sugar will improve with transition to oral prednisone.  Accu-Chek before meals and at bedtime.     Chronic generalized abdominal pain- (present on admission)   Assessment & Plan    -Continue home dose oxycodone 10 mg  TID.  Continue IV morphine 4 mg every 4 hours PRN.  I highly suspect drug-seeking behavior.  Do not give more narcotics. No acute " issues/pathology on CT scan.       Pancytopenia (HCC)- (present on admission)   Assessment & Plan    -Due to liver transplant, and splenomegaly.  Stable. Monitor blood counts closely.     Status post liver transplant Dr. [facility] ([facility])- (present on admission)   Assessment & Plan    - hold outpatient medication with tacrolimus and mycophenolate.  Continue midodrine.  - resume on discharge given acute bout of bronchitis       Quality-Core Measures

## 2018-11-22 NOTE — CARE PLAN
Problem: Safety  Goal: Will remain free from injury  Outcome: PROGRESSING AS EXPECTED  Refuses alarms, ambulates independently.Educated patient about fall risk and to use call light for needs and bathroom.

## 2018-11-22 NOTE — PROGRESS NOTES
Pulmonologist doctor  spoke with patient at bedside.Agreed that patient doing ok and that no transfer is necessary as of right now although patient is disagreement. New CT ordered for 1330.

## 2018-11-22 NOTE — PROGRESS NOTES
Patient A&Ox4, reporting severe abdominal pain, medicated with IV morphine as last dose was not received due to IV infiltration. 2 new PIVs established.   Lungs clear in all lobes, productive cough present, maintaining sats at baseline oxygen range of 4-6 L O2.  Bowel sounds hyperactive, abdomen distended in mid lower region (due to splenomegaly?).  Strength equal bilaterally, eyes PERRLA, numbness and tingling to bilateral bottoms of feet (baseline), no deficits noted.   POC discussed and patient reassured, no further needs at this time, call light within reach, bed alarm refused but patient is steady on feet without assistance.

## 2018-11-22 NOTE — PROGRESS NOTES
Patient to be transferred to [address] - report given to RN receiving patient. Patient very concerned about continuity of care at this time, assured patient that Pulmnologist Dr. Gonzalez will be following along with her care.

## 2018-11-23 ENCOUNTER — APPOINTMENT (OUTPATIENT)
Dept: RADIOLOGY | Facility: MEDICAL CENTER | Age: 58
DRG: 196 | End: 2018-11-23
Attending: INTERNAL MEDICINE
Payer: MEDICARE

## 2018-11-23 LAB
ANION GAP SERPL CALC-SCNC: 9 MMOL/L (ref 0–11.9)
BASOPHILS # BLD AUTO: 0.3 % (ref 0–1.8)
BASOPHILS # BLD: 0.02 K/UL (ref 0–0.12)
BUN SERPL-MCNC: 14 MG/DL (ref 8–22)
CALCIUM SERPL-MCNC: 8.8 MG/DL (ref 8.5–10.5)
CHLORIDE SERPL-SCNC: 110 MMOL/L (ref 96–112)
CO2 SERPL-SCNC: 23 MMOL/L (ref 20–33)
CREAT SERPL-MCNC: 0.88 MG/DL (ref 0.5–1.4)
EOSINOPHIL # BLD AUTO: 0.06 K/UL (ref 0–0.51)
EOSINOPHIL NFR BLD: 1 % (ref 0–6.9)
ERYTHROCYTE [DISTWIDTH] IN BLOOD BY AUTOMATED COUNT: 47 FL (ref 35.9–50)
GLUCOSE BLD-MCNC: 105 MG/DL (ref 65–99)
GLUCOSE BLD-MCNC: 133 MG/DL (ref 65–99)
GLUCOSE SERPL-MCNC: 136 MG/DL (ref 65–99)
HCT VFR BLD AUTO: 35.6 % (ref 37–47)
HGB BLD-MCNC: 11.7 G/DL (ref 12–16)
IMM GRANULOCYTES # BLD AUTO: 0.08 K/UL (ref 0–0.11)
IMM GRANULOCYTES NFR BLD AUTO: 1.3 % (ref 0–0.9)
LYMPHOCYTES # BLD AUTO: 0.75 K/UL (ref 1–4.8)
LYMPHOCYTES NFR BLD: 12.5 % (ref 22–41)
MCH RBC QN AUTO: 29.8 PG (ref 27–33)
MCHC RBC AUTO-ENTMCNC: 32.9 G/DL (ref 33.6–35)
MCV RBC AUTO: 90.6 FL (ref 81.4–97.8)
MONOCYTES # BLD AUTO: 0.16 K/UL (ref 0–0.85)
MONOCYTES NFR BLD AUTO: 2.7 % (ref 0–13.4)
NEUTROPHILS # BLD AUTO: 4.92 K/UL (ref 2–7.15)
NEUTROPHILS NFR BLD: 82.2 % (ref 44–72)
NRBC # BLD AUTO: 0 K/UL
NRBC BLD-RTO: 0 /100 WBC
PLATELET # BLD AUTO: 96 K/UL (ref 164–446)
PMV BLD AUTO: 9.3 FL (ref 9–12.9)
POTASSIUM SERPL-SCNC: 4.4 MMOL/L (ref 3.6–5.5)
RBC # BLD AUTO: 3.93 M/UL (ref 4.2–5.4)
SODIUM SERPL-SCNC: 142 MMOL/L (ref 135–145)
WBC # BLD AUTO: 6 K/UL (ref 4.8–10.8)

## 2018-11-23 PROCEDURE — 700111 HCHG RX REV CODE 636 W/ 250 OVERRIDE (IP): Performed by: INTERNAL MEDICINE

## 2018-11-23 PROCEDURE — 700111 HCHG RX REV CODE 636 W/ 250 OVERRIDE (IP): Performed by: HOSPITALIST

## 2018-11-23 PROCEDURE — 700102 HCHG RX REV CODE 250 W/ 637 OVERRIDE(OP): Performed by: INTERNAL MEDICINE

## 2018-11-23 PROCEDURE — A9270 NON-COVERED ITEM OR SERVICE: HCPCS | Performed by: INTERNAL MEDICINE

## 2018-11-23 PROCEDURE — 80048 BASIC METABOLIC PNL TOTAL CA: CPT

## 2018-11-23 PROCEDURE — 82962 GLUCOSE BLOOD TEST: CPT

## 2018-11-23 PROCEDURE — A9270 NON-COVERED ITEM OR SERVICE: HCPCS | Performed by: HOSPITALIST

## 2018-11-23 PROCEDURE — B54NZZA ULTRASONOGRAPHY OF LEFT UPPER EXTREMITY VEINS, GUIDANCE: ICD-10-PCS | Performed by: HOSPITALIST

## 2018-11-23 PROCEDURE — 770001 HCHG ROOM/CARE - MED/SURG/GYN PRIV*

## 2018-11-23 PROCEDURE — 36415 COLL VENOUS BLD VENIPUNCTURE: CPT

## 2018-11-23 PROCEDURE — 85025 COMPLETE CBC W/AUTO DIFF WBC: CPT

## 2018-11-23 PROCEDURE — 05HY33Z INSERTION OF INFUSION DEVICE INTO UPPER VEIN, PERCUTANEOUS APPROACH: ICD-10-PCS | Performed by: HOSPITALIST

## 2018-11-23 PROCEDURE — 99232 SBSQ HOSP IP/OBS MODERATE 35: CPT | Performed by: INTERNAL MEDICINE

## 2018-11-23 PROCEDURE — 36569 INSJ PICC 5 YR+ W/O IMAGING: CPT

## 2018-11-23 PROCEDURE — 700105 HCHG RX REV CODE 258: Performed by: INTERNAL MEDICINE

## 2018-11-23 PROCEDURE — 700102 HCHG RX REV CODE 250 W/ 637 OVERRIDE(OP): Performed by: HOSPITALIST

## 2018-11-23 RX ORDER — MYCOPHENOLATE MOFETIL 250 MG/1
250 CAPSULE ORAL 2 TIMES DAILY
Status: DISCONTINUED | OUTPATIENT
Start: 2018-11-23 | End: 2018-11-23

## 2018-11-23 RX ORDER — MORPHINE SULFATE 10 MG/ML
4 INJECTION, SOLUTION INTRAMUSCULAR; INTRAVENOUS
Status: DISCONTINUED | OUTPATIENT
Start: 2018-11-23 | End: 2018-11-28 | Stop reason: HOSPADM

## 2018-11-23 RX ORDER — MYCOPHENOLATE MOFETIL 250 MG/1
500 CAPSULE ORAL 2 TIMES DAILY
Status: DISCONTINUED | OUTPATIENT
Start: 2018-11-23 | End: 2018-11-28 | Stop reason: HOSPADM

## 2018-11-23 RX ORDER — TACROLIMUS 1 MG/1
1 CAPSULE ORAL 2 TIMES DAILY
Status: DISCONTINUED | OUTPATIENT
Start: 2018-11-23 | End: 2018-11-23

## 2018-11-23 RX ORDER — PREDNISONE 20 MG/1
30 TABLET ORAL DAILY
Status: DISCONTINUED | OUTPATIENT
Start: 2018-11-24 | End: 2018-11-27

## 2018-11-23 RX ADMIN — MORPHINE SULFATE 4 MG: 10 INJECTION INTRAVENOUS at 10:37

## 2018-11-23 RX ADMIN — MORPHINE SULFATE 4 MG: 10 INJECTION INTRAVENOUS at 20:48

## 2018-11-23 RX ADMIN — TRAZODONE HYDROCHLORIDE 100 MG: 100 TABLET ORAL at 22:35

## 2018-11-23 RX ADMIN — TIOTROPIUM BROMIDE 1 CAPSULE: 18 CAPSULE ORAL; RESPIRATORY (INHALATION) at 06:00

## 2018-11-23 RX ADMIN — GABAPENTIN 600 MG: 300 CAPSULE ORAL at 06:05

## 2018-11-23 RX ADMIN — TACROLIMUS 1.5 MG: 1 CAPSULE ORAL at 08:18

## 2018-11-23 RX ADMIN — CALCIUM POLYCARBOPHIL 625 MG: 625 TABLET, FILM COATED ORAL at 06:05

## 2018-11-23 RX ADMIN — AZITHROMYCIN 500 MG: 250 TABLET, FILM COATED ORAL at 22:35

## 2018-11-23 RX ADMIN — LEVOTHYROXINE SODIUM 137 MCG: 137 TABLET ORAL at 06:05

## 2018-11-23 RX ADMIN — MORPHINE SULFATE 4 MG: 10 INJECTION INTRAVENOUS at 03:09

## 2018-11-23 RX ADMIN — OMEPRAZOLE 40 MG: 20 CAPSULE, DELAYED RELEASE ORAL at 06:06

## 2018-11-23 RX ADMIN — MORPHINE SULFATE 4 MG: 10 INJECTION INTRAVENOUS at 14:09

## 2018-11-23 RX ADMIN — OXYCODONE HYDROCHLORIDE 10 MG: 10 TABLET ORAL at 06:08

## 2018-11-23 RX ADMIN — MORPHINE SULFATE 4 MG: 10 INJECTION INTRAVENOUS at 07:31

## 2018-11-23 RX ADMIN — FUROSEMIDE 20 MG: 20 TABLET ORAL at 06:00

## 2018-11-23 RX ADMIN — TACROLIMUS 1.5 MG: 1 CAPSULE ORAL at 20:49

## 2018-11-23 RX ADMIN — PREDNISONE 20 MG: 20 TABLET ORAL at 06:00

## 2018-11-23 RX ADMIN — PRAVASTATIN SODIUM 20 MG: 20 TABLET ORAL at 06:00

## 2018-11-23 RX ADMIN — ALPRAZOLAM 1 MG: 1 TABLET ORAL at 12:52

## 2018-11-23 RX ADMIN — STANDARDIZED SENNA CONCENTRATE AND DOCUSATE SODIUM 2 TABLET: 8.6; 5 TABLET, FILM COATED ORAL at 06:06

## 2018-11-23 RX ADMIN — ALPRAZOLAM 1 MG: 1 TABLET ORAL at 08:18

## 2018-11-23 RX ADMIN — AMBRISENTAN 10 MG: 10 TABLET, FILM COATED ORAL at 06:00

## 2018-11-23 RX ADMIN — CEFTRIAXONE SODIUM 2 G: 2 INJECTION, POWDER, FOR SOLUTION INTRAMUSCULAR; INTRAVENOUS at 06:04

## 2018-11-23 RX ADMIN — OXYCODONE HYDROCHLORIDE AND ACETAMINOPHEN 500 MG: 500 TABLET ORAL at 06:05

## 2018-11-23 RX ADMIN — GUAIFENESIN AND DEXTROMETHORPHAN 5 ML: 100; 10 SYRUP ORAL at 22:37

## 2018-11-23 RX ADMIN — MYCOPHENOLATE MOFETIL 500 MG: 250 CAPSULE ORAL at 08:18

## 2018-11-23 RX ADMIN — GABAPENTIN 600 MG: 300 CAPSULE ORAL at 12:52

## 2018-11-23 RX ADMIN — STANDARDIZED SENNA CONCENTRATE AND DOCUSATE SODIUM 2 TABLET: 8.6; 5 TABLET, FILM COATED ORAL at 20:48

## 2018-11-23 RX ADMIN — MYCOPHENOLATE MOFETIL 500 MG: 250 CAPSULE ORAL at 20:49

## 2018-11-23 RX ADMIN — ALPRAZOLAM 1 MG: 1 TABLET ORAL at 22:35

## 2018-11-23 RX ADMIN — ALPRAZOLAM 1 MG: 1 TABLET ORAL at 17:23

## 2018-11-23 RX ADMIN — GUAIFENESIN AND DEXTROMETHORPHAN 5 ML: 100; 10 SYRUP ORAL at 12:52

## 2018-11-23 RX ADMIN — GABAPENTIN 600 MG: 300 CAPSULE ORAL at 17:23

## 2018-11-23 RX ADMIN — MORPHINE SULFATE 4 MG: 10 INJECTION INTRAVENOUS at 23:58

## 2018-11-23 RX ADMIN — METHYLNALTREXONE BROMIDE 12 MG: 12 INJECTION, SOLUTION SUBCUTANEOUS at 06:00

## 2018-11-23 RX ADMIN — Medication 81 MG: at 06:05

## 2018-11-23 RX ADMIN — TADALAFIL 20 MG: 20 TABLET ORAL at 21:00

## 2018-11-23 RX ADMIN — GUAIFENESIN AND DEXTROMETHORPHAN 5 ML: 100; 10 SYRUP ORAL at 03:09

## 2018-11-23 RX ADMIN — MORPHINE SULFATE 4 MG: 10 INJECTION INTRAVENOUS at 17:20

## 2018-11-23 RX ADMIN — SERTRALINE 100 MG: 100 TABLET, FILM COATED ORAL at 20:54

## 2018-11-23 ASSESSMENT — PAIN SCALES - GENERAL
PAINLEVEL_OUTOF10: 0
PAINLEVEL_OUTOF10: 8

## 2018-11-23 ASSESSMENT — ENCOUNTER SYMPTOMS
FOCAL WEAKNESS: 0
VOMITING: 0
HEARTBURN: 0
SORE THROAT: 0
DIARRHEA: 0
NERVOUS/ANXIOUS: 1
HEMOPTYSIS: 0
HEADACHES: 0
SPUTUM PRODUCTION: 1
NAUSEA: 0
ABDOMINAL PAIN: 1
SINUS PAIN: 0
NECK PAIN: 0
PALPITATIONS: 0
SHORTNESS OF BREATH: 1
WHEEZING: 1
DIZZINESS: 0
COUGH: 1
CONSTIPATION: 0
BLOOD IN STOOL: 0
MYALGIAS: 0

## 2018-11-23 ASSESSMENT — PATIENT HEALTH QUESTIONNAIRE - PHQ9
SUM OF ALL RESPONSES TO PHQ9 QUESTIONS 1 AND 2: 0
2. FEELING DOWN, DEPRESSED, IRRITABLE, OR HOPELESS: NOT AT ALL
1. LITTLE INTEREST OR PLEASURE IN DOING THINGS: NOT AT ALL

## 2018-11-23 NOTE — PROGRESS NOTES
"[facility] Hospitalist Progress Note    Date of Service: 11/23/2018    Chief Complaint  58 y.o. female admitted 11/19/2018 with cough, wheezing and shortness of breath.  Pt has long medical hx including ILD reportedly currently treated by pulmonologist at [facility], s/p gastric bypass, chronic immunosuppression s/p liver transplant, chronic pain syndrome.  On presentation, CXR and clinical presentation indicative of possible bronchitis vs ILD/sarcoidosis \"flare\" so patient started on Roceph/Azithromycin as well as prednisone (refused IV steroids).  Chart review indicates drug seeking bahavior as well.      Pt no clinical decompensation since hospitalization.  Pt has been seen by pulmnologist and discussion held between, [facility] pulm and [facility] pulm.  Agreed that patient doing ok and that no transfer is necessary as of right now although patient is disagreement.      Infectious disease who knows patient well, recommends that Rocephin/Azithromycin x 5 days is sufficient since no airspace infiltrate obvious on CXR.      Interval Problem Update  Pt seen and examined, no acute events overnight, afebrile, upset for the current care, was seen by Dr. Gonzalez, pulmonology yesterday.   Complains of abdominal pain.  Still with cough.   Cont with the steroids and IV abx.       Consultants/Specialty  none    Disposition  Pending clinical course        Review of Systems   Constitutional: Positive for malaise/fatigue.   HENT: Negative for sinus pain and sore throat.    Respiratory: Positive for cough, sputum production, shortness of breath and wheezing. Negative for hemoptysis.    Cardiovascular: Negative for chest pain, palpitations and leg swelling.   Gastrointestinal: Positive for abdominal pain. Negative for blood in stool, constipation, diarrhea, heartburn, nausea and vomiting.   Genitourinary: Negative for dysuria and urgency.   Musculoskeletal: Negative for myalgias and neck pain.   Neurological: Negative for dizziness, focal weakness " and headaches.   Psychiatric/Behavioral: The patient is nervous/anxious.       Physical Exam  Laboratory/Imaging   Hemodynamics  Temp (24hrs), Av °C (98.6 °F), Min:36.5 °C (97.7 °F), Max:37.3 °C (99.1 °F)   Temperature: 37.3 °C (99.1 °F)  Pulse  Av.1  Min: 50  Max: 87    Blood Pressure: 129/72      Respiratory      Respiration: 16, Pulse Oximetry: 93 %     Work Of Breathing / Effort: Mild  RUL Breath Sounds: Clear, RML Breath Sounds: Clear, RLL Breath Sounds: Clear, MANJINDER Breath Sounds: Clear, LLL Breath Sounds: Clear    Fluids    Intake/Output Summary (Last 24 hours) at 18 1149  Last data filed at 18 0600   Gross per 24 hour   Intake              300 ml   Output                0 ml   Net              300 ml       Nutrition  Orders Placed This Encounter   Procedures   • Diet Order Regular     Standing Status:   Standing     Number of Occurrences:   1     Order Specific Question:   Diet:     Answer:   Regular [1]     Physical Exam   Constitutional: She is oriented to person, place, and time. She appears well-developed and well-nourished.   HENT:   Head: Normocephalic and atraumatic.   Eyes: Pupils are equal, round, and reactive to light. Conjunctivae are normal. No scleral icterus.   Neck: Neck supple. No JVD present.   Cardiovascular: Regular rhythm.  Exam reveals no gallop.    Pulmonary/Chest: Effort normal. No respiratory distress. She has wheezes. She has no rales. She exhibits no tenderness.   Coarse   Abdominal: Soft. She exhibits distension and mass. There is tenderness. There is guarding.   Musculoskeletal: Normal range of motion. She exhibits no edema or tenderness.   Neurological: She is alert and oriented to person, place, and time. A cranial nerve deficit is present.   Skin: Skin is warm.   Psychiatric: Her behavior is normal. Thought content normal.   Labile  crying   Nursing note and vitals reviewed.      Recent Labs      18   0046  18   0141   WBC  3.0*  4.3*   RBC  3.85*  " 4.00*   HEMOGLOBIN  11.7*  11.8*   HEMATOCRIT  34.9*  37.8   MCV  90.6  94.5   MCH  30.4  29.5   MCHC  33.5*  31.2*   RDW  46.2  49.1   PLATELETCT  85*  89*   MPV  9.6  9.5     Recent Labs      11/21/18   0046  11/22/18   0141   SODIUM  137  140   POTASSIUM  5.0  4.3   CHLORIDE  108  111   CO2  25  22   GLUCOSE  307*  197*   BUN  13  14   CREATININE  1.22  0.91   CALCIUM  8.0*  8.5                      Assessment/Plan     * Acute and chronic respiratory failure with hypoxia due to bronchitis, ILD flare (HCC)- (present on admission)   Assessment & Plan    -short course of steroids planned  - patient refusing higher dose of steroids or IV steroids on admission  -Continue her on bronchodilators per RT protocol.   - Continue oxygen supplements, keep saturations above 88%.  Continue home dose Spiriva.  -Patient claims that her pulmonologist is calling her, but we have not received any calls from [facility] [[email or web address]  Currently, there is no indication to transfer her to a higher level of care.  -spoke with Dr. Gonzalez, pt no current clinical decompensation will c/w current tx  -increased Xanax to qid        Bronchitis- (present on admission)   Assessment & Plan    -Continue IV ceftriaxone and azithromycin, d4  -plan to complete 5 days course of antibiotics, curbside ID done.  -continue w/ duonebs  -wean oxygen to maintain oxygen saturation between 88-92%  Close monitoring        Drug-seeking behavior, with narcotic dependence- (present on admission)   Assessment & Plan    -Continue  IV morphine 4mg every 3 hours PRN.   Monitor      Type 2 diabetes mellitus (HCC)- (present on admission)   Assessment & Plan    -Allegedly was \"cured\" with weight loss.  Has steroid hyperglycemia.   -Start sliding scale insulin coverage.  Anticipate blood sugar will improve with transition to oral prednisone.  Accu-Chek before meals and at bedtime.     Chronic generalized abdominal pain- (present on admission)   Assessment & Plan    -Continue home dose " oxycodone 10 mg  TID.  Continue IV morphine 4 mg every 4 hours PRN.  I highly suspect drug-seeking behavior.  Do not give more narcotics. No acute issues/pathology on CT scan.       Pancytopenia (HCC)- (present on admission)   Assessment & Plan    -Due to liver transplant, and splenomegaly.  Stable. Monitor blood counts closely.     Status post liver transplant Dr. Canada ([facility])- (present on admission)   Assessment & Plan    -cont  outpatient medication  tacrolimus and mycophenolate.  Continue midodrine.       Quality-Core Measures   Reviewed items::  Labs reviewed, Radiology images reviewed and Medications reviewed  Holbrook catheter::  No Holbrook  Antibiotics:  Treating active infection/contamination beyond 24 hours perioperative coverage

## 2018-11-23 NOTE — PROGRESS NOTES
said I could give the CT results to patient because the results was no new changes from last CT. Patient was calm and no grimacing but states pain 8/10. Facial expression relaxed.Patient eating and talking. Explained to patient about the morphine is for breakthrough pain. Patient stated I rather have a PCA pump or only IV morphine instead of pain medications oral.

## 2018-11-23 NOTE — CARE PLAN
Problem: Infection  Goal: Will remain free from infection  Outcome: PROGRESSING AS EXPECTED  IV rocephin administered per MAR for 5 days, last dose to be on 11/24    Problem: Respiratory:  Goal: Respiratory status will improve  Outcome: PROGRESSING AS EXPECTED  Patient at baseline O2 demand. Productive cough still present, providing robitussin per MAR. Educated on IS use .    Problem: Psychosocial Needs:  Goal: Level of anxiety will decrease  Outcome: PROGRESSING AS EXPECTED  Provide frequent distraction and reassurance to help with anxiety. Scheduled xanax administered per MAR.

## 2018-11-23 NOTE — PROGRESS NOTES
Notified Dr.Catherine Luz to update on CT results and that patients pain is still at 8/10 on pain scale and patient is anxious. spoke with patient earlier at bedside and said he would speak with  about revising pain medication to a different frequency if possible.

## 2018-11-23 NOTE — PROGRESS NOTES
No new orders received.POC was discussed with patient and consult. CT results had no new changes per MD.

## 2018-11-23 NOTE — CARE PLAN
Problem: Safety  Goal: Will remain free from injury  Outcome: PROGRESSING AS EXPECTED  Calls out appropriately.Call light in reach and tray table. Ambulates in the room.

## 2018-11-23 NOTE — DISCHARGE PLANNING
Anticipated Discharge Disposition:   TBD    Action:   Bedside RN notified CM that pt wants a new PCP.  Left Message for [phone] if this is possible. Requested a call back.    Barriers to Discharge:   Medical clearance    Plan:   Follow up with [phone]  Meet pt.     Addendum:  Clarification from bedside RN. Pt wanted a new hospitalist so now Dr. Busch is following.  MD making medication adjustments.

## 2018-11-23 NOTE — PROGRESS NOTES
Called this AM, by patient RN to notify that patient was very upset that her immunosuppressive medications were temporarily held yesterday (Cellcept and Prograf).  Patient missed one dose of each medication.  Medications have been re-ordered.  Patient fired hospitalist team, charge nurse has been notified.  Patient will not be seen by myself today.

## 2018-11-23 NOTE — PROGRESS NOTES
Spoke to Dr.Catherine Luz that patient was upset that her transplant medication was discontinued without talking with her.  stated that she spoke with  and they agreed to take her off temporary related to her illness at this time.  said she would put the medications back on the MAR. Notified charge nurse and charge said patient requested a new primary doctor at this time.    Per night shift Deanne Nagel reported that cellcept and prograf discontinued by hospitalist. Patent has history of liver transplant and requires these meds, last note from hospitalist mentions discontinuing these meds until discharge but does not explain why.    Notified patient and night shift nurse of updates.

## 2018-11-23 NOTE — PROGRESS NOTES
cellcept and prograf discontinued by hospitalist. Patent has history of liver transplant and requires these meds, last note from hospitalist mentions discontinuing these meds until discharge but does not explain why. Patient very upset but explained to her that we need to wait for the primary team to arrive before we can fix this situation or understand the reasoning behind hospitalists decision. Will discuss with day RN and have her send a page to primary team at beginning of shift. Patient tearful at this time, provided reassurance.

## 2018-11-23 NOTE — PROGRESS NOTES
Gave bedside report to Deanne Nagel night shift RN. Updated night urse of consults and MD and spoke with case management.

## 2018-11-24 LAB
ANION GAP SERPL CALC-SCNC: 7 MMOL/L (ref 0–11.9)
BACTERIA BLD CULT: NORMAL
BUN SERPL-MCNC: 16 MG/DL (ref 8–22)
CALCIUM SERPL-MCNC: 8.5 MG/DL (ref 8.5–10.5)
CHLORIDE SERPL-SCNC: 111 MMOL/L (ref 96–112)
CO2 SERPL-SCNC: 24 MMOL/L (ref 20–33)
CREAT SERPL-MCNC: 0.75 MG/DL (ref 0.5–1.4)
ERYTHROCYTE [DISTWIDTH] IN BLOOD BY AUTOMATED COUNT: 47.7 FL (ref 35.9–50)
GLUCOSE BLD-MCNC: 149 MG/DL (ref 65–99)
GLUCOSE BLD-MCNC: 186 MG/DL (ref 65–99)
GLUCOSE BLD-MCNC: 86 MG/DL (ref 65–99)
GLUCOSE SERPL-MCNC: 90 MG/DL (ref 65–99)
HCT VFR BLD AUTO: 34.6 % (ref 37–47)
HGB BLD-MCNC: 11.8 G/DL (ref 12–16)
MCH RBC QN AUTO: 30.6 PG (ref 27–33)
MCHC RBC AUTO-ENTMCNC: 34.1 G/DL (ref 33.6–35)
MCV RBC AUTO: 89.9 FL (ref 81.4–97.8)
PLATELET # BLD AUTO: 99 K/UL (ref 164–446)
PMV BLD AUTO: 9.1 FL (ref 9–12.9)
POTASSIUM SERPL-SCNC: 3.8 MMOL/L (ref 3.6–5.5)
RBC # BLD AUTO: 3.85 M/UL (ref 4.2–5.4)
SIGNIFICANT IND 70042: NORMAL
SITE SITE: NORMAL
SODIUM SERPL-SCNC: 142 MMOL/L (ref 135–145)
SOURCE SOURCE: NORMAL
WBC # BLD AUTO: 5.5 K/UL (ref 4.8–10.8)

## 2018-11-24 PROCEDURE — 700111 HCHG RX REV CODE 636 W/ 250 OVERRIDE (IP): Performed by: HOSPITALIST

## 2018-11-24 PROCEDURE — 99232 SBSQ HOSP IP/OBS MODERATE 35: CPT | Performed by: INTERNAL MEDICINE

## 2018-11-24 PROCEDURE — 85027 COMPLETE CBC AUTOMATED: CPT

## 2018-11-24 PROCEDURE — 700102 HCHG RX REV CODE 250 W/ 637 OVERRIDE(OP): Performed by: INTERNAL MEDICINE

## 2018-11-24 PROCEDURE — A9270 NON-COVERED ITEM OR SERVICE: HCPCS | Performed by: INTERNAL MEDICINE

## 2018-11-24 PROCEDURE — 700102 HCHG RX REV CODE 250 W/ 637 OVERRIDE(OP): Performed by: HOSPITALIST

## 2018-11-24 PROCEDURE — 80048 BASIC METABOLIC PNL TOTAL CA: CPT

## 2018-11-24 PROCEDURE — 700111 HCHG RX REV CODE 636 W/ 250 OVERRIDE (IP): Performed by: INTERNAL MEDICINE

## 2018-11-24 PROCEDURE — 770001 HCHG ROOM/CARE - MED/SURG/GYN PRIV*

## 2018-11-24 PROCEDURE — 700105 HCHG RX REV CODE 258: Performed by: INTERNAL MEDICINE

## 2018-11-24 PROCEDURE — 36415 COLL VENOUS BLD VENIPUNCTURE: CPT

## 2018-11-24 PROCEDURE — 82962 GLUCOSE BLOOD TEST: CPT | Mod: 91

## 2018-11-24 PROCEDURE — A9270 NON-COVERED ITEM OR SERVICE: HCPCS | Performed by: HOSPITALIST

## 2018-11-24 RX ADMIN — PRAVASTATIN SODIUM 20 MG: 20 TABLET ORAL at 06:49

## 2018-11-24 RX ADMIN — MYCOPHENOLATE MOFETIL 500 MG: 250 CAPSULE ORAL at 06:31

## 2018-11-24 RX ADMIN — PREDNISONE 30 MG: 20 TABLET ORAL at 06:49

## 2018-11-24 RX ADMIN — MORPHINE SULFATE 4 MG: 10 INJECTION INTRAVENOUS at 10:10

## 2018-11-24 RX ADMIN — TIOTROPIUM BROMIDE 1 CAPSULE: 18 CAPSULE ORAL; RESPIRATORY (INHALATION) at 06:31

## 2018-11-24 RX ADMIN — MYCOPHENOLATE MOFETIL 500 MG: 250 CAPSULE ORAL at 17:28

## 2018-11-24 RX ADMIN — Medication 81 MG: at 06:49

## 2018-11-24 RX ADMIN — MORPHINE SULFATE 4 MG: 10 INJECTION INTRAVENOUS at 07:12

## 2018-11-24 RX ADMIN — METHYLNALTREXONE BROMIDE 12 MG: 12 INJECTION, SOLUTION SUBCUTANEOUS at 08:42

## 2018-11-24 RX ADMIN — TRAZODONE HYDROCHLORIDE 100 MG: 100 TABLET ORAL at 22:32

## 2018-11-24 RX ADMIN — TACROLIMUS 1.5 MG: 1 CAPSULE ORAL at 17:27

## 2018-11-24 RX ADMIN — STANDARDIZED SENNA CONCENTRATE AND DOCUSATE SODIUM 2 TABLET: 8.6; 5 TABLET, FILM COATED ORAL at 17:28

## 2018-11-24 RX ADMIN — MORPHINE SULFATE 4 MG: 10 INJECTION INTRAVENOUS at 13:26

## 2018-11-24 RX ADMIN — OXYCODONE HYDROCHLORIDE AND ACETAMINOPHEN 500 MG: 500 TABLET ORAL at 06:50

## 2018-11-24 RX ADMIN — ALPRAZOLAM 1 MG: 1 TABLET ORAL at 17:28

## 2018-11-24 RX ADMIN — STANDARDIZED SENNA CONCENTRATE AND DOCUSATE SODIUM 2 TABLET: 8.6; 5 TABLET, FILM COATED ORAL at 06:46

## 2018-11-24 RX ADMIN — GUAIFENESIN AND DEXTROMETHORPHAN 5 ML: 100; 10 SYRUP ORAL at 16:29

## 2018-11-24 RX ADMIN — GABAPENTIN 600 MG: 300 CAPSULE ORAL at 18:44

## 2018-11-24 RX ADMIN — FUROSEMIDE 20 MG: 20 TABLET ORAL at 06:49

## 2018-11-24 RX ADMIN — GABAPENTIN 600 MG: 300 CAPSULE ORAL at 12:39

## 2018-11-24 RX ADMIN — ALPRAZOLAM 1 MG: 1 TABLET ORAL at 08:40

## 2018-11-24 RX ADMIN — GABAPENTIN 600 MG: 300 CAPSULE ORAL at 06:50

## 2018-11-24 RX ADMIN — ALPRAZOLAM 1 MG: 1 TABLET ORAL at 22:33

## 2018-11-24 RX ADMIN — MORPHINE SULFATE 4 MG: 10 INJECTION INTRAVENOUS at 23:12

## 2018-11-24 RX ADMIN — ONDANSETRON 4 MG: 2 INJECTION INTRAMUSCULAR; INTRAVENOUS at 09:39

## 2018-11-24 RX ADMIN — CEFTRIAXONE SODIUM 2 G: 2 INJECTION, POWDER, FOR SOLUTION INTRAMUSCULAR; INTRAVENOUS at 07:03

## 2018-11-24 RX ADMIN — MORPHINE SULFATE 4 MG: 10 INJECTION INTRAVENOUS at 03:52

## 2018-11-24 RX ADMIN — MORPHINE SULFATE 4 MG: 10 INJECTION INTRAVENOUS at 20:00

## 2018-11-24 RX ADMIN — LEVOTHYROXINE SODIUM 137 MCG: 137 TABLET ORAL at 06:46

## 2018-11-24 RX ADMIN — AMBRISENTAN 10 MG: 10 TABLET, FILM COATED ORAL at 06:00

## 2018-11-24 RX ADMIN — TACROLIMUS 1.5 MG: 1 CAPSULE ORAL at 06:31

## 2018-11-24 RX ADMIN — BISACODYL 20 MG: 10 SUPPOSITORY RECTAL at 06:51

## 2018-11-24 RX ADMIN — GUAIFENESIN AND DEXTROMETHORPHAN 5 ML: 100; 10 SYRUP ORAL at 22:40

## 2018-11-24 RX ADMIN — OMEPRAZOLE 40 MG: 20 CAPSULE, DELAYED RELEASE ORAL at 06:47

## 2018-11-24 RX ADMIN — TADALAFIL 20 MG: 20 TABLET ORAL at 21:00

## 2018-11-24 RX ADMIN — ONDANSETRON 4 MG: 2 INJECTION INTRAMUSCULAR; INTRAVENOUS at 16:29

## 2018-11-24 RX ADMIN — SERTRALINE 100 MG: 100 TABLET, FILM COATED ORAL at 22:33

## 2018-11-24 RX ADMIN — MORPHINE SULFATE 4 MG: 10 INJECTION INTRAVENOUS at 16:29

## 2018-11-24 RX ADMIN — CALCIUM POLYCARBOPHIL 625 MG: 625 TABLET, FILM COATED ORAL at 06:31

## 2018-11-24 RX ADMIN — ALPRAZOLAM 1 MG: 1 TABLET ORAL at 12:39

## 2018-11-24 RX ADMIN — INSULIN HUMAN 2 UNITS: 100 INJECTION, SOLUTION PARENTERAL at 17:28

## 2018-11-24 ASSESSMENT — ENCOUNTER SYMPTOMS
DIZZINESS: 0
NERVOUS/ANXIOUS: 1
CONSTIPATION: 0
SINUS PAIN: 0
SHORTNESS OF BREATH: 1
NAUSEA: 0
VOMITING: 0
WHEEZING: 1
SORE THROAT: 0
HEMOPTYSIS: 0
NECK PAIN: 0
DIARRHEA: 0
HEARTBURN: 0
MYALGIAS: 0
SPUTUM PRODUCTION: 1
HEADACHES: 0
FOCAL WEAKNESS: 0
COUGH: 1
PALPITATIONS: 0
ABDOMINAL PAIN: 1
BLOOD IN STOOL: 0

## 2018-11-24 ASSESSMENT — PAIN SCALES - GENERAL
PAINLEVEL_OUTOF10: 8
PAINLEVEL_OUTOF10: 7
PAINLEVEL_OUTOF10: 2
PAINLEVEL_OUTOF10: 8
PAINLEVEL_OUTOF10: 3
PAINLEVEL_OUTOF10: 7
PAINLEVEL_OUTOF10: 7

## 2018-11-24 NOTE — CARE PLAN
Problem: Safety  Goal: Will remain free from injury  Outcome: PROGRESSING AS EXPECTED    Intervention: Provide assistance with mobility  Encourage to call for any assistance needed, call light within reach.      Problem: Infection  Goal: Will remain free from infection  Outcome: PROGRESSING AS EXPECTED    Intervention: Assess signs and symptoms of infection  On IV antibiotics as ordered, monitor WBC results, monitor for any fevers.

## 2018-11-24 NOTE — PROGRESS NOTES
"[facility] Hospitalist Progress Note    Date of Service: 11/24/2018    Chief Complaint  58 y.o. female admitted 11/19/2018 with cough, wheezing and shortness of breath.  Pt has long medical hx including ILD reportedly currently treated by pulmonologist at [facility], s/p gastric bypass, chronic immunosuppression s/p liver transplant, chronic pain syndrome.  On presentation, CXR and clinical presentation indicative of possible bronchitis vs ILD/sarcoidosis \"flare\" so patient started on Roceph/Azithromycin as well as prednisone (refused IV steroids).  Chart review indicates drug seeking bahavior as well.      Pt no clinical decompensation since hospitalization.  Pt has been seen by pulmnologist and discussion held between, [facility] pulm and [facility] pulm.  Agreed that patient doing ok and that no transfer is necessary as of right now although patient is disagreement.      Infectious disease who knows patient well, recommends that Rocephin/Azithromycin x 5 days is sufficient since no airspace infiltrate obvious on CXR.      Interval Problem Update  No acute events overnight, afebrile, upset for the current care, was seen by Dr. Gonzalez, pulmonology yesterday.   Still with abdominal pain, but a little better controlled   Still with cough.   Cont with the steroids and IV abx.       Consultants/Specialty  none    Disposition  Pending clinical course        Review of Systems   Constitutional: Positive for malaise/fatigue.   HENT: Negative for sinus pain and sore throat.    Respiratory: Positive for cough, sputum production, shortness of breath and wheezing. Negative for hemoptysis.    Cardiovascular: Negative for chest pain, palpitations and leg swelling.   Gastrointestinal: Positive for abdominal pain. Negative for blood in stool, constipation, diarrhea, heartburn, nausea and vomiting.   Genitourinary: Negative for dysuria and urgency.   Musculoskeletal: Negative for myalgias and neck pain.   Neurological: Negative for dizziness, focal " weakness and headaches.   Psychiatric/Behavioral: The patient is nervous/anxious.       Physical Exam  Laboratory/Imaging   Hemodynamics  Temp (24hrs), Av.7 °C (98 °F), Min:36.5 °C (97.7 °F), Max:36.7 °C (98.1 °F)   Temperature: 36.7 °C (98.1 °F)  Pulse  Av.6  Min: 50  Max: 89    Blood Pressure: 123/61      Respiratory      Respiration: 18, Pulse Oximetry: 97 %     Work Of Breathing / Effort: Mild  RUL Breath Sounds: Clear, RML Breath Sounds: Diminished, RLL Breath Sounds: Clear, MANJINDER Breath Sounds: Clear, LLL Breath Sounds: Diminished    Fluids  No intake or output data in the 24 hours ending 18 1119    Nutrition  Orders Placed This Encounter   Procedures   • Diet Order Regular     Standing Status:   Standing     Number of Occurrences:   1     Order Specific Question:   Diet:     Answer:   Regular [1]     Physical Exam   Constitutional: She is oriented to person, place, and time. She appears well-developed and well-nourished.   HENT:   Head: Normocephalic and atraumatic.   Eyes: Pupils are equal, round, and reactive to light. Conjunctivae are normal. No scleral icterus.   Neck: Neck supple. No JVD present.   Cardiovascular: Regular rhythm.  Exam reveals no gallop.    Pulmonary/Chest: Effort normal. No respiratory distress. She has wheezes. She has no rales. She exhibits no tenderness.   Coarse   Abdominal: Soft. She exhibits distension and mass. There is tenderness. There is guarding.   Musculoskeletal: Normal range of motion. She exhibits no edema or tenderness.   Neurological: She is alert and oriented to person, place, and time. A cranial nerve deficit is present.   Skin: Skin is warm.   Psychiatric: Her behavior is normal. Thought content normal.   Labile  crying   Nursing note and vitals reviewed.      Recent Labs      18   0141  18   1235  18   0339   WBC  4.3*  6.0  5.5   RBC  4.00*  3.93*  3.85*   HEMOGLOBIN  11.8*  11.7*  11.8*   HEMATOCRIT  37.8  35.6*  34.6*   MCV  94.5   "90.6  89.9   MCH  29.5  29.8  30.6   MCHC  31.2*  32.9*  34.1   RDW  49.1  47.0  47.7   PLATELETCT  89*  96*  99*   MPV  9.5  9.3  9.1     Recent Labs      11/22/18   0141  11/23/18   1235  11/24/18   0339   SODIUM  140  142  142   POTASSIUM  4.3  4.4  3.8   CHLORIDE  111  110  111   CO2  22  23  24   GLUCOSE  197*  136*  90   BUN  14  14  16   CREATININE  0.91  0.88  0.75   CALCIUM  8.5  8.8  8.5                      Assessment/Plan     * Acute and chronic respiratory failure with hypoxia due to bronchitis, ILD flare (HCC)- (present on admission)   Assessment & Plan    -short course of steroids planned  - patient refusing higher dose of steroids or IV steroids on admission  -Continue her on bronchodilators per RT protocol.   - Continue oxygen supplements, keep saturations above 88%.  Continue home dose Spiriva.  -Patient claims that her pulmonologist is calling her, but we have not received any calls from [facility].  Currently, there is no indication to transfer her to a higher level of care.  -spoke with Dr. Gonzalez, pt no current clinical decompensation will c/w current tx  -increased Xanax to qid        Bronchitis- (present on admission)   Assessment & Plan    -Continue IV ceftriaxone and azithromycin, d4  -plan to complete 5 days course of antibiotics, curbside ID done.  -continue w/ duonebs  -wean oxygen to maintain oxygen saturation between 88-92%  Close monitoring        Drug-seeking behavior, with narcotic dependence- (present on admission)   Assessment & Plan    -Continue  IV morphine 4mg every 3 hours PRN.   Monitor      Type 2 diabetes mellitus (HCC)- (present on admission)   Assessment & Plan    -Allegedly was \"cured\" with weight loss.  Has steroid hyperglycemia.   -Start sliding scale insulin coverage.  Anticipate blood sugar will improve with transition to oral prednisone.  Accu-Chek before meals and at bedtime.     Chronic generalized abdominal pain- (present on admission)   Assessment & Plan    -Continue " home dose oxycodone 10 mg  TID.  Continue IV morphine 4 mg every 4 hours PRN.  I highly suspect drug-seeking behavior.  Do not give more narcotics. No acute issues/pathology on CT scan.       Pancytopenia (HCC)- (present on admission)   Assessment & Plan    -Due to liver transplant, and splenomegaly.  Stable. Monitor blood counts closely.     Status post liver transplant Dr. Canada ([facility])- (present on admission)   Assessment & Plan    -cont  outpatient medication  tacrolimus and mycophenolate.  Continue midodrine.       Quality-Core Measures   Reviewed items::  Labs reviewed, Radiology images reviewed and Medications reviewed  Holbrook catheter::  No Holbrook  Antibiotics:  Treating active infection/contamination beyond 24 hours perioperative coverage

## 2018-11-24 NOTE — DISCHARGE PLANNING
Anticipated Discharge Disposition:  Home     Action:   Met with pt who was ambulating independently around room .  Pt has home O2 from [facility]. She stated that she owns all DMEs but does not use any.   Wants to be transferred to [facility] but not medically necessary/no need for higher level of care.  Pt stated that she likes [facility] better. Encouraged pt to speak with unit manager about her concerns which she confirmed that she did already.     Barriers to Discharge:   Medical clearance    Plan:   Follow up with MD   RN kindly notify CM if there are any discharge concerns.

## 2018-11-24 NOTE — PROCEDURES
Vascular Access Team    Date of Insertion: 11/23/18  Arm Circumference: n/a  Line Length: 10  Line Size: 18  Vein Occupancy %: 30  Reason for Midline: Access  Labs: WBC 6.0, PLT 96, INR n/a, BUN 14, Cr 0.88, GFR >60    Orders confirmed, vessel patency confirmed with ultrasound. Risks and benefits of procedure explained to patient and education regarding line associated bloodstream infections provided. Questions answered.     PowerGlide Midline placed in LUE per MD order with ultrasound guidance. 20g, 10 cm line placed in brachial vein after 1 attempt(s).  Catheter inserted with good blood return. Secured with 0cm external from insertion site.  Flushed without resistance with 10 mL 0.9% normal saline. Midline secured with Biopatch and Tegaderm.     Midline placement is confirmed by nurse using ultrasound and ability to flush and draw blood. Midline is appropriate for use at this time.  No X-ray is needed for placement confirmation. Pt tolerated procedure well.  Patient condition relayed to unit RN or ordering physician via this post procedure note in the EMR.    Ultrasound images uploaded to PACS and viewable in the EMR - yes  Ultrasound imaged printed and placed in paper chart - no      BARD PowerGlide Midline ref # [id number], Lot # [id number]

## 2018-11-25 LAB
ANION GAP SERPL CALC-SCNC: 5 MMOL/L (ref 0–11.9)
BUN SERPL-MCNC: 18 MG/DL (ref 8–22)
CALCIUM SERPL-MCNC: 8.6 MG/DL (ref 8.5–10.5)
CHLORIDE SERPL-SCNC: 108 MMOL/L (ref 96–112)
CO2 SERPL-SCNC: 28 MMOL/L (ref 20–33)
CREAT SERPL-MCNC: 0.87 MG/DL (ref 0.5–1.4)
ERYTHROCYTE [DISTWIDTH] IN BLOOD BY AUTOMATED COUNT: 47.9 FL (ref 35.9–50)
GLUCOSE BLD-MCNC: 129 MG/DL (ref 65–99)
GLUCOSE BLD-MCNC: 134 MG/DL (ref 65–99)
GLUCOSE BLD-MCNC: 141 MG/DL (ref 65–99)
GLUCOSE BLD-MCNC: 192 MG/DL (ref 65–99)
GLUCOSE BLD-MCNC: 96 MG/DL (ref 65–99)
GLUCOSE SERPL-MCNC: 106 MG/DL (ref 65–99)
HCT VFR BLD AUTO: 33.2 % (ref 37–47)
HGB BLD-MCNC: 10.9 G/DL (ref 12–16)
MCH RBC QN AUTO: 30.1 PG (ref 27–33)
MCHC RBC AUTO-ENTMCNC: 32.8 G/DL (ref 33.6–35)
MCV RBC AUTO: 91.7 FL (ref 81.4–97.8)
PLATELET # BLD AUTO: 99 K/UL (ref 164–446)
PMV BLD AUTO: 9.4 FL (ref 9–12.9)
POTASSIUM SERPL-SCNC: 4 MMOL/L (ref 3.6–5.5)
RBC # BLD AUTO: 3.62 M/UL (ref 4.2–5.4)
SODIUM SERPL-SCNC: 141 MMOL/L (ref 135–145)
WBC # BLD AUTO: 5.4 K/UL (ref 4.8–10.8)

## 2018-11-25 PROCEDURE — 82962 GLUCOSE BLOOD TEST: CPT | Mod: 91

## 2018-11-25 PROCEDURE — 80048 BASIC METABOLIC PNL TOTAL CA: CPT

## 2018-11-25 PROCEDURE — 99231 SBSQ HOSP IP/OBS SF/LOW 25: CPT | Performed by: INTERNAL MEDICINE

## 2018-11-25 PROCEDURE — A9270 NON-COVERED ITEM OR SERVICE: HCPCS | Performed by: HOSPITALIST

## 2018-11-25 PROCEDURE — 700102 HCHG RX REV CODE 250 W/ 637 OVERRIDE(OP): Performed by: INTERNAL MEDICINE

## 2018-11-25 PROCEDURE — 700111 HCHG RX REV CODE 636 W/ 250 OVERRIDE (IP): Performed by: INTERNAL MEDICINE

## 2018-11-25 PROCEDURE — A9270 NON-COVERED ITEM OR SERVICE: HCPCS | Performed by: INTERNAL MEDICINE

## 2018-11-25 PROCEDURE — 700105 HCHG RX REV CODE 258: Performed by: INTERNAL MEDICINE

## 2018-11-25 PROCEDURE — 85027 COMPLETE CBC AUTOMATED: CPT

## 2018-11-25 PROCEDURE — 700111 HCHG RX REV CODE 636 W/ 250 OVERRIDE (IP): Performed by: HOSPITALIST

## 2018-11-25 PROCEDURE — 99232 SBSQ HOSP IP/OBS MODERATE 35: CPT | Performed by: INTERNAL MEDICINE

## 2018-11-25 PROCEDURE — 770001 HCHG ROOM/CARE - MED/SURG/GYN PRIV*

## 2018-11-25 PROCEDURE — 700102 HCHG RX REV CODE 250 W/ 637 OVERRIDE(OP): Performed by: HOSPITALIST

## 2018-11-25 RX ADMIN — BISACODYL 20 MG: 10 SUPPOSITORY RECTAL at 05:57

## 2018-11-25 RX ADMIN — ALPRAZOLAM 1 MG: 1 TABLET ORAL at 18:06

## 2018-11-25 RX ADMIN — PREDNISONE 30 MG: 20 TABLET ORAL at 05:56

## 2018-11-25 RX ADMIN — FUROSEMIDE 20 MG: 20 TABLET ORAL at 05:57

## 2018-11-25 RX ADMIN — MORPHINE SULFATE 4 MG: 10 INJECTION INTRAVENOUS at 04:00

## 2018-11-25 RX ADMIN — OXYCODONE HYDROCHLORIDE AND ACETAMINOPHEN 500 MG: 500 TABLET ORAL at 05:57

## 2018-11-25 RX ADMIN — TIOTROPIUM BROMIDE 1 CAPSULE: 18 CAPSULE ORAL; RESPIRATORY (INHALATION) at 07:40

## 2018-11-25 RX ADMIN — MORPHINE SULFATE 4 MG: 10 INJECTION INTRAVENOUS at 18:06

## 2018-11-25 RX ADMIN — ALPRAZOLAM 1 MG: 1 TABLET ORAL at 21:56

## 2018-11-25 RX ADMIN — LEVOTHYROXINE SODIUM 137 MCG: 137 TABLET ORAL at 05:55

## 2018-11-25 RX ADMIN — METHYLNALTREXONE BROMIDE 12 MG: 12 INJECTION, SOLUTION SUBCUTANEOUS at 06:09

## 2018-11-25 RX ADMIN — STANDARDIZED SENNA CONCENTRATE AND DOCUSATE SODIUM 2 TABLET: 8.6; 5 TABLET, FILM COATED ORAL at 18:06

## 2018-11-25 RX ADMIN — GABAPENTIN 600 MG: 300 CAPSULE ORAL at 05:55

## 2018-11-25 RX ADMIN — CEFTRIAXONE SODIUM 2 G: 2 INJECTION, POWDER, FOR SOLUTION INTRAMUSCULAR; INTRAVENOUS at 06:22

## 2018-11-25 RX ADMIN — GABAPENTIN 600 MG: 300 CAPSULE ORAL at 12:05

## 2018-11-25 RX ADMIN — MORPHINE SULFATE 4 MG: 10 INJECTION INTRAVENOUS at 02:37

## 2018-11-25 RX ADMIN — MORPHINE SULFATE 4 MG: 10 INJECTION INTRAVENOUS at 12:06

## 2018-11-25 RX ADMIN — AMBRISENTAN 10 MG: 10 TABLET, FILM COATED ORAL at 06:00

## 2018-11-25 RX ADMIN — MYCOPHENOLATE MOFETIL 500 MG: 250 CAPSULE ORAL at 18:06

## 2018-11-25 RX ADMIN — OMEPRAZOLE 40 MG: 20 CAPSULE, DELAYED RELEASE ORAL at 05:55

## 2018-11-25 RX ADMIN — SERTRALINE 100 MG: 100 TABLET, FILM COATED ORAL at 21:56

## 2018-11-25 RX ADMIN — GABAPENTIN 600 MG: 300 CAPSULE ORAL at 18:06

## 2018-11-25 RX ADMIN — TRAZODONE HYDROCHLORIDE 100 MG: 100 TABLET ORAL at 21:56

## 2018-11-25 RX ADMIN — ONDANSETRON 4 MG: 2 INJECTION INTRAMUSCULAR; INTRAVENOUS at 18:22

## 2018-11-25 RX ADMIN — TADALAFIL 20 MG: 20 TABLET ORAL at 21:00

## 2018-11-25 RX ADMIN — CALCIUM POLYCARBOPHIL 625 MG: 625 TABLET, FILM COATED ORAL at 06:07

## 2018-11-25 RX ADMIN — MYCOPHENOLATE MOFETIL 500 MG: 250 CAPSULE ORAL at 06:07

## 2018-11-25 RX ADMIN — MORPHINE SULFATE 4 MG: 10 INJECTION INTRAVENOUS at 05:53

## 2018-11-25 RX ADMIN — TACROLIMUS 1.5 MG: 1 CAPSULE ORAL at 06:07

## 2018-11-25 RX ADMIN — Medication 81 MG: at 05:56

## 2018-11-25 RX ADMIN — ALPRAZOLAM 1 MG: 1 TABLET ORAL at 12:06

## 2018-11-25 RX ADMIN — INSULIN HUMAN 2 UNITS: 100 INJECTION, SOLUTION PARENTERAL at 12:04

## 2018-11-25 RX ADMIN — STANDARDIZED SENNA CONCENTRATE AND DOCUSATE SODIUM 2 TABLET: 8.6; 5 TABLET, FILM COATED ORAL at 05:56

## 2018-11-25 RX ADMIN — BISACODYL 5 MG: 5 TABLET, COATED ORAL at 05:57

## 2018-11-25 RX ADMIN — MORPHINE SULFATE 4 MG: 10 INJECTION INTRAVENOUS at 21:13

## 2018-11-25 RX ADMIN — MORPHINE SULFATE 4 MG: 10 INJECTION INTRAVENOUS at 08:56

## 2018-11-25 RX ADMIN — MORPHINE SULFATE 4 MG: 10 INJECTION INTRAVENOUS at 15:03

## 2018-11-25 RX ADMIN — ALPRAZOLAM 1 MG: 1 TABLET ORAL at 10:20

## 2018-11-25 RX ADMIN — TACROLIMUS 1.5 MG: 1 CAPSULE ORAL at 18:06

## 2018-11-25 RX ADMIN — PRAVASTATIN SODIUM 20 MG: 20 TABLET ORAL at 05:56

## 2018-11-25 ASSESSMENT — ENCOUNTER SYMPTOMS
MYALGIAS: 0
FOCAL WEAKNESS: 0
SPUTUM PRODUCTION: 1
HEADACHES: 0
COUGH: 1
SHORTNESS OF BREATH: 1
DIZZINESS: 0
HEMOPTYSIS: 0
ABDOMINAL PAIN: 1
HEARTBURN: 0
PALPITATIONS: 0
WHEEZING: 1
SINUS PAIN: 0
NERVOUS/ANXIOUS: 1
BLOOD IN STOOL: 0
CONSTIPATION: 0
NAUSEA: 0
VOMITING: 0
NECK PAIN: 0
SORE THROAT: 0
DIARRHEA: 0

## 2018-11-25 ASSESSMENT — PAIN SCALES - GENERAL
PAINLEVEL_OUTOF10: 8
PAINLEVEL_OUTOF10: 7
PAINLEVEL_OUTOF10: 8
PAINLEVEL_OUTOF10: 2
PAINLEVEL_OUTOF10: 8
PAINLEVEL_OUTOF10: 8

## 2018-11-25 NOTE — PROGRESS NOTES
Patient been ambulating inside room, had shower, seen by MD at bedside will continue plan of care.

## 2018-11-25 NOTE — CARE PLAN
Problem: Safety  Goal: Will remain free from injury    Intervention: Provide assistance with mobility  Encourage to call for any assistance needed, call light withn reach.      Problem: Pain Management  Goal: Pain level will decrease to patient's comfort goal  Outcome: PROGRESSING AS EXPECTED    Intervention: Educate and implement non-pharmacologic comfort measures. Examples: relaxation, distration, play therapy, activity therapy, massage, etc.  Pain assessment q 2 hours, medicated as needed, comfort measures provided.

## 2018-11-26 LAB
ANION GAP SERPL CALC-SCNC: 6 MMOL/L (ref 0–11.9)
BUN SERPL-MCNC: 18 MG/DL (ref 8–22)
CALCIUM SERPL-MCNC: 8.4 MG/DL (ref 8.5–10.5)
CHLORIDE SERPL-SCNC: 111 MMOL/L (ref 96–112)
CO2 SERPL-SCNC: 26 MMOL/L (ref 20–33)
CREAT SERPL-MCNC: 0.73 MG/DL (ref 0.5–1.4)
ERYTHROCYTE [DISTWIDTH] IN BLOOD BY AUTOMATED COUNT: 50.4 FL (ref 35.9–50)
GLUCOSE BLD-MCNC: 129 MG/DL (ref 65–99)
GLUCOSE BLD-MCNC: 162 MG/DL (ref 65–99)
GLUCOSE BLD-MCNC: 226 MG/DL (ref 65–99)
GLUCOSE BLD-MCNC: 90 MG/DL (ref 65–99)
GLUCOSE SERPL-MCNC: 114 MG/DL (ref 65–99)
HCT VFR BLD AUTO: 34.1 % (ref 37–47)
HGB BLD-MCNC: 10.9 G/DL (ref 12–16)
MCH RBC QN AUTO: 30.3 PG (ref 27–33)
MCHC RBC AUTO-ENTMCNC: 32 G/DL (ref 33.6–35)
MCV RBC AUTO: 94.7 FL (ref 81.4–97.8)
PLATELET # BLD AUTO: 95 K/UL (ref 164–446)
PMV BLD AUTO: 9.3 FL (ref 9–12.9)
POTASSIUM SERPL-SCNC: 3.8 MMOL/L (ref 3.6–5.5)
RBC # BLD AUTO: 3.6 M/UL (ref 4.2–5.4)
SODIUM SERPL-SCNC: 143 MMOL/L (ref 135–145)
WBC # BLD AUTO: 4.3 K/UL (ref 4.8–10.8)

## 2018-11-26 PROCEDURE — 82962 GLUCOSE BLOOD TEST: CPT

## 2018-11-26 PROCEDURE — 700102 HCHG RX REV CODE 250 W/ 637 OVERRIDE(OP): Performed by: HOSPITALIST

## 2018-11-26 PROCEDURE — A9270 NON-COVERED ITEM OR SERVICE: HCPCS | Performed by: INTERNAL MEDICINE

## 2018-11-26 PROCEDURE — A9270 NON-COVERED ITEM OR SERVICE: HCPCS | Performed by: HOSPITALIST

## 2018-11-26 PROCEDURE — 700105 HCHG RX REV CODE 258: Performed by: INTERNAL MEDICINE

## 2018-11-26 PROCEDURE — 700111 HCHG RX REV CODE 636 W/ 250 OVERRIDE (IP): Performed by: HOSPITALIST

## 2018-11-26 PROCEDURE — 700111 HCHG RX REV CODE 636 W/ 250 OVERRIDE (IP): Performed by: INTERNAL MEDICINE

## 2018-11-26 PROCEDURE — 36415 COLL VENOUS BLD VENIPUNCTURE: CPT

## 2018-11-26 PROCEDURE — 85027 COMPLETE CBC AUTOMATED: CPT

## 2018-11-26 PROCEDURE — 80048 BASIC METABOLIC PNL TOTAL CA: CPT

## 2018-11-26 PROCEDURE — 99232 SBSQ HOSP IP/OBS MODERATE 35: CPT | Performed by: INTERNAL MEDICINE

## 2018-11-26 PROCEDURE — 700102 HCHG RX REV CODE 250 W/ 637 OVERRIDE(OP): Performed by: INTERNAL MEDICINE

## 2018-11-26 PROCEDURE — 770001 HCHG ROOM/CARE - MED/SURG/GYN PRIV*

## 2018-11-26 RX ADMIN — GABAPENTIN 600 MG: 300 CAPSULE ORAL at 17:35

## 2018-11-26 RX ADMIN — LEVOTHYROXINE SODIUM 137 MCG: 137 TABLET ORAL at 05:27

## 2018-11-26 RX ADMIN — TACROLIMUS 1.5 MG: 1 CAPSULE ORAL at 17:35

## 2018-11-26 RX ADMIN — INSULIN HUMAN 2 UNITS: 100 INJECTION, SOLUTION PARENTERAL at 17:33

## 2018-11-26 RX ADMIN — MYCOPHENOLATE MOFETIL 500 MG: 250 CAPSULE ORAL at 05:34

## 2018-11-26 RX ADMIN — ALPRAZOLAM 1 MG: 1 TABLET ORAL at 08:25

## 2018-11-26 RX ADMIN — ALPRAZOLAM 1 MG: 1 TABLET ORAL at 16:33

## 2018-11-26 RX ADMIN — MORPHINE SULFATE 4 MG: 10 INJECTION INTRAVENOUS at 19:45

## 2018-11-26 RX ADMIN — INSULIN HUMAN 3 UNITS: 100 INJECTION, SOLUTION PARENTERAL at 12:11

## 2018-11-26 RX ADMIN — GABAPENTIN 600 MG: 300 CAPSULE ORAL at 12:15

## 2018-11-26 RX ADMIN — SERTRALINE 100 MG: 100 TABLET, FILM COATED ORAL at 19:45

## 2018-11-26 RX ADMIN — METHYLNALTREXONE BROMIDE 12 MG: 12 INJECTION, SOLUTION SUBCUTANEOUS at 05:35

## 2018-11-26 RX ADMIN — MORPHINE SULFATE 4 MG: 10 INJECTION INTRAVENOUS at 10:34

## 2018-11-26 RX ADMIN — ALPRAZOLAM 1 MG: 1 TABLET ORAL at 12:16

## 2018-11-26 RX ADMIN — CEFTRIAXONE SODIUM 2 G: 2 INJECTION, POWDER, FOR SOLUTION INTRAMUSCULAR; INTRAVENOUS at 05:36

## 2018-11-26 RX ADMIN — MORPHINE SULFATE 4 MG: 10 INJECTION INTRAVENOUS at 16:33

## 2018-11-26 RX ADMIN — ALPRAZOLAM 1 MG: 1 TABLET ORAL at 19:45

## 2018-11-26 RX ADMIN — ONDANSETRON 4 MG: 2 INJECTION INTRAMUSCULAR; INTRAVENOUS at 18:43

## 2018-11-26 RX ADMIN — TADALAFIL 20 MG: 20 TABLET ORAL at 19:45

## 2018-11-26 RX ADMIN — BISACODYL 5 MG: 5 TABLET, COATED ORAL at 05:27

## 2018-11-26 RX ADMIN — AMBRISENTAN 10 MG: 10 TABLET, FILM COATED ORAL at 06:00

## 2018-11-26 RX ADMIN — STANDARDIZED SENNA CONCENTRATE AND DOCUSATE SODIUM 2 TABLET: 8.6; 5 TABLET, FILM COATED ORAL at 17:36

## 2018-11-26 RX ADMIN — MORPHINE SULFATE 4 MG: 10 INJECTION INTRAVENOUS at 01:22

## 2018-11-26 RX ADMIN — GABAPENTIN 600 MG: 300 CAPSULE ORAL at 05:27

## 2018-11-26 RX ADMIN — TIOTROPIUM BROMIDE 1 CAPSULE: 18 CAPSULE ORAL; RESPIRATORY (INHALATION) at 05:35

## 2018-11-26 RX ADMIN — BISACODYL 20 MG: 10 SUPPOSITORY RECTAL at 05:26

## 2018-11-26 RX ADMIN — TACROLIMUS 1.5 MG: 1 CAPSULE ORAL at 05:35

## 2018-11-26 RX ADMIN — FUROSEMIDE 20 MG: 20 TABLET ORAL at 05:28

## 2018-11-26 RX ADMIN — MYCOPHENOLATE MOFETIL 500 MG: 250 CAPSULE ORAL at 17:35

## 2018-11-26 RX ADMIN — TRAZODONE HYDROCHLORIDE 100 MG: 100 TABLET ORAL at 19:45

## 2018-11-26 RX ADMIN — Medication 81 MG: at 05:27

## 2018-11-26 RX ADMIN — MORPHINE SULFATE 4 MG: 10 INJECTION INTRAVENOUS at 04:32

## 2018-11-26 RX ADMIN — MORPHINE SULFATE 4 MG: 10 INJECTION INTRAVENOUS at 13:33

## 2018-11-26 RX ADMIN — STANDARDIZED SENNA CONCENTRATE AND DOCUSATE SODIUM 2 TABLET: 8.6; 5 TABLET, FILM COATED ORAL at 05:27

## 2018-11-26 RX ADMIN — CALCIUM POLYCARBOPHIL 625 MG: 625 TABLET, FILM COATED ORAL at 05:35

## 2018-11-26 RX ADMIN — MORPHINE SULFATE 4 MG: 10 INJECTION INTRAVENOUS at 07:34

## 2018-11-26 RX ADMIN — OMEPRAZOLE 40 MG: 20 CAPSULE, DELAYED RELEASE ORAL at 05:27

## 2018-11-26 RX ADMIN — PREDNISONE 30 MG: 20 TABLET ORAL at 05:27

## 2018-11-26 RX ADMIN — PRAVASTATIN SODIUM 20 MG: 20 TABLET ORAL at 05:27

## 2018-11-26 RX ADMIN — OXYCODONE HYDROCHLORIDE AND ACETAMINOPHEN 500 MG: 500 TABLET ORAL at 05:27

## 2018-11-26 RX ADMIN — GUAIFENESIN AND DEXTROMETHORPHAN 5 ML: 100; 10 SYRUP ORAL at 18:43

## 2018-11-26 ASSESSMENT — PAIN SCALES - GENERAL
PAINLEVEL_OUTOF10: 8
PAINLEVEL_OUTOF10: 10
PAINLEVEL_OUTOF10: 4
PAINLEVEL_OUTOF10: 8
PAINLEVEL_OUTOF10: 3
PAINLEVEL_OUTOF10: 8

## 2018-11-26 ASSESSMENT — ENCOUNTER SYMPTOMS
SINUS PAIN: 0
ABDOMINAL PAIN: 1
NECK PAIN: 0
WHEEZING: 1
FOCAL WEAKNESS: 0
SPUTUM PRODUCTION: 1
HEADACHES: 0
HEARTBURN: 0
DIZZINESS: 0
PALPITATIONS: 0
VOMITING: 0
NERVOUS/ANXIOUS: 1
CONSTIPATION: 0
SORE THROAT: 0
COUGH: 1
HEMOPTYSIS: 0
BLOOD IN STOOL: 0
DIARRHEA: 0
SHORTNESS OF BREATH: 1
MYALGIAS: 0
NAUSEA: 0

## 2018-11-26 NOTE — PROGRESS NOTES
Ms. Cuba has a history of liver transplantation and remains on immunosuppressive therapy with CellCept and prednisone.  Liver pathology suggested sarcoidosis and she is followed by the sarcoidosis clinic at the [facility].  She underwent an abdominal incisional hernia repair 10 days ago and is now been hospitalized for a week with increasing dyspnea and chest congestion.  Examination radiographic findings suggest atelectasis or possible pneumonia.  She has been effectively treated with titrated supplemental oxygen, respiratory therapy, pain control and antibiotics.  She is chronically leukopenic on immunosuppressive therapy but her white blood cell differential has normalized and she remains afebrile.    I discussed her situation with Dr. Latoya Arango at the [facility] sarcoidosis clinic.  We agree on the current therapy. Dr Arango is requested a volumetric CT scan.  I have also discussed the treatment plan with  and agree with antibiotic therapy and potential discharge in the next few days.

## 2018-11-26 NOTE — PROGRESS NOTES
"[facility] Hospitalist Progress Note    Date of Service: 11/26/2018    Chief Complaint  58 y.o. female admitted 11/19/2018 with cough, wheezing and shortness of breath.  Pt has long medical hx including ILD reportedly currently treated by pulmonologist at [facility], s/p gastric bypass, chronic immunosuppression s/p liver transplant, chronic pain syndrome.  On presentation, CXR and clinical presentation indicative of possible bronchitis vs ILD/sarcoidosis \"flare\" so patient started on Roceph/Azithromycin as well as prednisone (refused IV steroids).  Chart review indicates drug seeking bahavior as well.      Pt no clinical decompensation since hospitalization.  Pt has been seen by pulmnologist and discussion held between, [facility] pulm and [facility] pulm.  Agreed that patient doing ok and that no transfer is necessary as of right now although patient is disagreement.      Infectious disease who knows patient well, recommends that Rocephin/Azithromycin x 5 days is sufficient since no airspace infiltrate obvious on CXR.      Interval Problem Update  No acute events overnight afebrile   Still with abdominal pain, but a little better controlled   Still with cough.   Cont with the steroids and IV abx.     Seen also by pulmonology Dr. Lainez     Consultants/Specialty  none    Disposition  Pending clinical course        Review of Systems   Constitutional: Positive for malaise/fatigue.   HENT: Negative for sinus pain and sore throat.    Respiratory: Positive for cough, sputum production, shortness of breath and wheezing. Negative for hemoptysis.    Cardiovascular: Negative for chest pain, palpitations and leg swelling.   Gastrointestinal: Positive for abdominal pain. Negative for blood in stool, constipation, diarrhea, heartburn, nausea and vomiting.   Genitourinary: Negative for dysuria and urgency.   Musculoskeletal: Negative for myalgias and neck pain.   Neurological: Negative for dizziness, focal weakness and headaches. "   Psychiatric/Behavioral: The patient is nervous/anxious.       Physical Exam  Laboratory/Imaging   Hemodynamics  Temp (24hrs), Av.8 °C (98.3 °F), Min:36.5 °C (97.7 °F), Max:37.3 °C (99.1 °F)   Temperature: 36.7 °C (98 °F)  Pulse  Av.7  Min: 50  Max: 89    Blood Pressure: 101/63      Respiratory      Respiration: 16, Pulse Oximetry: 97 %     Work Of Breathing / Effort: Mild  RUL Breath Sounds: Clear, RML Breath Sounds: Clear, RLL Breath Sounds: Clear, MANJINDER Breath Sounds: Clear, LLL Breath Sounds: Clear    Fluids    Intake/Output Summary (Last 24 hours) at 18 1040  Last data filed at 18 0800   Gross per 24 hour   Intake              580 ml   Output                0 ml   Net              580 ml       Nutrition  Orders Placed This Encounter   Procedures   • Diet Order Regular     Standing Status:   Standing     Number of Occurrences:   1     Order Specific Question:   Diet:     Answer:   Regular [1]     Physical Exam   Constitutional: She is oriented to person, place, and time. She appears well-developed and well-nourished.   HENT:   Head: Normocephalic and atraumatic.   Eyes: Pupils are equal, round, and reactive to light. Conjunctivae are normal. No scleral icterus.   Neck: Neck supple. No JVD present.   Cardiovascular: Regular rhythm.  Exam reveals no gallop.    Pulmonary/Chest: Effort normal. No respiratory distress. She has wheezes. She has no rales. She exhibits no tenderness.   Coarse   Abdominal: Soft. She exhibits distension and mass. There is tenderness. There is guarding.   Musculoskeletal: Normal range of motion. She exhibits no edema or tenderness.   Neurological: She is alert and oriented to person, place, and time. A cranial nerve deficit is present.   Skin: Skin is warm.   Psychiatric: Her behavior is normal. Thought content normal.   Labile  crying   Nursing note and vitals reviewed.      Recent Labs      18   0339  18   0230  18   0204   WBC  5.5  5.4  4.3*   RBC   "3.85*  3.62*  3.60*   HEMOGLOBIN  11.8*  10.9*  10.9*   HEMATOCRIT  34.6*  33.2*  34.1*   MCV  89.9  91.7  94.7   MCH  30.6  30.1  30.3   MCHC  34.1  32.8*  32.0*   RDW  47.7  47.9  50.4*   PLATELETCT  99*  99*  95*   MPV  9.1  9.4  9.3     Recent Labs      11/24/18   0339  11/25/18   0230  11/26/18   0204   SODIUM  142  141  143   POTASSIUM  3.8  4.0  3.8   CHLORIDE  111  108  111   CO2  24  28  26   GLUCOSE  90  106*  114*   BUN  16  18  18   CREATININE  0.75  0.87  0.73   CALCIUM  8.5  8.6  8.4*                      Assessment/Plan     * Acute and chronic respiratory failure with hypoxia due to bronchitis, ILD flare (HCC)- (present on admission)   Assessment & Plan    -short course of steroids planned  - patient refusing higher dose of steroids or IV steroids on admission  -Continue her on bronchodilators per RT protocol.   - Continue oxygen supplements, keep saturations above 88%.  Continue home dose Spiriva.  -Patient claims that her pulmonologist is calling her, but we have not received any calls from [facility].  Currently, there is no indication to transfer her to a higher level of care.  -spoke with Dr. Gonzalez, pt no current clinical decompensation will c/w current tx  -increased Xanax to qid        Bronchitis- (present on admission)   Assessment & Plan    -Continue IV ceftriaxone and azithromycin, d4  -plan to complete 5 days course of antibiotics, curbside ID done.  -continue w/ duonebs  -wean oxygen to maintain oxygen saturation between 88-92%  Close monitoring        Drug-seeking behavior, with narcotic dependence- (present on admission)   Assessment & Plan    -Continue  IV morphine 4mg every 3 hours PRN.   Monitor      Type 2 diabetes mellitus (HCC)- (present on admission)   Assessment & Plan    -Allegedly was \"cured\" with weight loss.  Has steroid hyperglycemia.   -Start sliding scale insulin coverage.  Anticipate blood sugar will improve with transition to oral prednisone.  Accu-Chek before meals and at " bedtime.     Chronic generalized abdominal pain- (present on admission)   Assessment & Plan      -Continue IV morphine 4 mg every 3hours PRN. . No acute issues/pathology on CT scan.       Pancytopenia (HCC)- (present on admission)   Assessment & Plan    -Due to liver transplant, and splenomegaly.  Stable. Monitor blood counts closely.     Status post liver transplant Dr. Canada ([facility])- (present on admission)   Assessment & Plan    -cont  outpatient medication  tacrolimus and mycophenolate.  Continue midodrine.       Quality-Core Measures   Reviewed items::  Labs reviewed, Radiology images reviewed and Medications reviewed  Holbrook catheter::  No Holbrook  Antibiotics:  Treating active infection/contamination beyond 24 hours perioperative coverage

## 2018-11-27 ENCOUNTER — APPOINTMENT (OUTPATIENT)
Dept: RADIOLOGY | Facility: MEDICAL CENTER | Age: 58
DRG: 196 | End: 2018-11-27
Attending: HOSPITALIST
Payer: MEDICARE

## 2018-11-27 ENCOUNTER — PATIENT OUTREACH (OUTPATIENT)
Dept: HEALTH INFORMATION MANAGEMENT | Facility: OTHER | Age: 58
End: 2018-11-27

## 2018-11-27 DIAGNOSIS — K59.03 CONSTIPATION DUE TO OPIOID THERAPY: ICD-10-CM

## 2018-11-27 DIAGNOSIS — T40.2X5A CONSTIPATION DUE TO OPIOID THERAPY: ICD-10-CM

## 2018-11-27 DIAGNOSIS — Z71.89 COMPLEX CARE COORDINATION: ICD-10-CM

## 2018-11-27 DIAGNOSIS — E03.9 HYPOTHYROIDISM, ADULT: ICD-10-CM

## 2018-11-27 LAB
GLUCOSE BLD-MCNC: 149 MG/DL (ref 65–99)
GLUCOSE BLD-MCNC: 207 MG/DL (ref 65–99)
GLUCOSE BLD-MCNC: 81 MG/DL (ref 65–99)

## 2018-11-27 PROCEDURE — 700111 HCHG RX REV CODE 636 W/ 250 OVERRIDE (IP): Performed by: INTERNAL MEDICINE

## 2018-11-27 PROCEDURE — A9270 NON-COVERED ITEM OR SERVICE: HCPCS | Performed by: INTERNAL MEDICINE

## 2018-11-27 PROCEDURE — 82962 GLUCOSE BLOOD TEST: CPT | Mod: 91

## 2018-11-27 PROCEDURE — 700102 HCHG RX REV CODE 250 W/ 637 OVERRIDE(OP): Performed by: HOSPITALIST

## 2018-11-27 PROCEDURE — 700102 HCHG RX REV CODE 250 W/ 637 OVERRIDE(OP): Performed by: INTERNAL MEDICINE

## 2018-11-27 PROCEDURE — A9270 NON-COVERED ITEM OR SERVICE: HCPCS | Performed by: HOSPITALIST

## 2018-11-27 PROCEDURE — 700111 HCHG RX REV CODE 636 W/ 250 OVERRIDE (IP): Performed by: HOSPITALIST

## 2018-11-27 PROCEDURE — 700117 HCHG RX CONTRAST REV CODE 255: Performed by: HOSPITALIST

## 2018-11-27 PROCEDURE — 770001 HCHG ROOM/CARE - MED/SURG/GYN PRIV*

## 2018-11-27 PROCEDURE — 74177 CT ABD & PELVIS W/CONTRAST: CPT

## 2018-11-27 PROCEDURE — 99232 SBSQ HOSP IP/OBS MODERATE 35: CPT | Performed by: HOSPITALIST

## 2018-11-27 RX ORDER — PREDNISONE 20 MG/1
20 TABLET ORAL DAILY
Status: DISCONTINUED | OUTPATIENT
Start: 2018-11-28 | End: 2018-11-28 | Stop reason: HOSPADM

## 2018-11-27 RX ORDER — LEVOTHYROXINE SODIUM 137 UG/1
137 TABLET ORAL
Qty: 90 TAB | Refills: 0 | Status: SHIPPED | OUTPATIENT
Start: 2018-11-27 | End: 2019-02-06 | Stop reason: SDUPTHER

## 2018-11-27 RX ADMIN — PREDNISONE 30 MG: 20 TABLET ORAL at 06:16

## 2018-11-27 RX ADMIN — TACROLIMUS 1.5 MG: 1 CAPSULE ORAL at 06:15

## 2018-11-27 RX ADMIN — ALPRAZOLAM 1 MG: 1 TABLET ORAL at 12:53

## 2018-11-27 RX ADMIN — MORPHINE SULFATE 4 MG: 10 INJECTION INTRAVENOUS at 16:35

## 2018-11-27 RX ADMIN — Medication 81 MG: at 06:16

## 2018-11-27 RX ADMIN — SERTRALINE 100 MG: 100 TABLET, FILM COATED ORAL at 20:39

## 2018-11-27 RX ADMIN — IOHEXOL 100 ML: 350 INJECTION, SOLUTION INTRAVENOUS at 19:43

## 2018-11-27 RX ADMIN — MYCOPHENOLATE MOFETIL 500 MG: 250 CAPSULE ORAL at 17:17

## 2018-11-27 RX ADMIN — METHYLNALTREXONE BROMIDE 12 MG: 12 INJECTION, SOLUTION SUBCUTANEOUS at 06:17

## 2018-11-27 RX ADMIN — MYCOPHENOLATE MOFETIL 500 MG: 250 CAPSULE ORAL at 06:16

## 2018-11-27 RX ADMIN — OXYCODONE HYDROCHLORIDE AND ACETAMINOPHEN 500 MG: 500 TABLET ORAL at 06:16

## 2018-11-27 RX ADMIN — GABAPENTIN 600 MG: 300 CAPSULE ORAL at 06:15

## 2018-11-27 RX ADMIN — TACROLIMUS 1.5 MG: 1 CAPSULE ORAL at 17:17

## 2018-11-27 RX ADMIN — ALPRAZOLAM 1 MG: 1 TABLET ORAL at 16:35

## 2018-11-27 RX ADMIN — MORPHINE SULFATE 4 MG: 10 INJECTION INTRAVENOUS at 07:22

## 2018-11-27 RX ADMIN — GABAPENTIN 600 MG: 300 CAPSULE ORAL at 16:35

## 2018-11-27 RX ADMIN — CALCIUM POLYCARBOPHIL 625 MG: 625 TABLET, FILM COATED ORAL at 06:15

## 2018-11-27 RX ADMIN — INSULIN HUMAN 3 UNITS: 100 INJECTION, SOLUTION PARENTERAL at 17:20

## 2018-11-27 RX ADMIN — TRAZODONE HYDROCHLORIDE 100 MG: 100 TABLET ORAL at 22:21

## 2018-11-27 RX ADMIN — MORPHINE SULFATE 4 MG: 10 INJECTION INTRAVENOUS at 19:22

## 2018-11-27 RX ADMIN — ALPRAZOLAM 1 MG: 1 TABLET ORAL at 08:57

## 2018-11-27 RX ADMIN — MORPHINE SULFATE 4 MG: 10 INJECTION INTRAVENOUS at 13:32

## 2018-11-27 RX ADMIN — LEVOTHYROXINE SODIUM 137 MCG: 137 TABLET ORAL at 06:15

## 2018-11-27 RX ADMIN — PRAVASTATIN SODIUM 20 MG: 20 TABLET ORAL at 06:15

## 2018-11-27 RX ADMIN — MORPHINE SULFATE 4 MG: 10 INJECTION INTRAVENOUS at 04:04

## 2018-11-27 RX ADMIN — MORPHINE SULFATE 4 MG: 10 INJECTION INTRAVENOUS at 22:20

## 2018-11-27 RX ADMIN — FUROSEMIDE 20 MG: 20 TABLET ORAL at 06:16

## 2018-11-27 RX ADMIN — BISACODYL 20 MG: 10 SUPPOSITORY RECTAL at 06:15

## 2018-11-27 RX ADMIN — ONDANSETRON 4 MG: 2 INJECTION INTRAMUSCULAR; INTRAVENOUS at 08:58

## 2018-11-27 RX ADMIN — OMEPRAZOLE 40 MG: 20 CAPSULE, DELAYED RELEASE ORAL at 06:15

## 2018-11-27 RX ADMIN — ALPRAZOLAM 1 MG: 1 TABLET ORAL at 20:39

## 2018-11-27 RX ADMIN — AMBRISENTAN 10 MG: 10 TABLET, FILM COATED ORAL at 06:19

## 2018-11-27 RX ADMIN — TIOTROPIUM BROMIDE 1 CAPSULE: 18 CAPSULE ORAL; RESPIRATORY (INHALATION) at 06:14

## 2018-11-27 RX ADMIN — MORPHINE SULFATE 4 MG: 10 INJECTION INTRAVENOUS at 10:19

## 2018-11-27 RX ADMIN — GABAPENTIN 600 MG: 300 CAPSULE ORAL at 11:12

## 2018-11-27 RX ADMIN — TADALAFIL 20 MG: 20 TABLET ORAL at 21:00

## 2018-11-27 ASSESSMENT — ENCOUNTER SYMPTOMS
COUGH: 0
NAUSEA: 0
WHEEZING: 0
HEARTBURN: 0
CONSTIPATION: 1
DIZZINESS: 0
SORE THROAT: 0
SHORTNESS OF BREATH: 0
NERVOUS/ANXIOUS: 1
PALPITATIONS: 0
FOCAL WEAKNESS: 0
BLOOD IN STOOL: 0
MYALGIAS: 0
HEMOPTYSIS: 0
VOMITING: 0
DIARRHEA: 0
NECK PAIN: 0
SINUS PAIN: 0
SPUTUM PRODUCTION: 0
HEADACHES: 0
ABDOMINAL PAIN: 1

## 2018-11-27 ASSESSMENT — PAIN SCALES - GENERAL
PAINLEVEL_OUTOF10: 9
PAINLEVEL_OUTOF10: 9
PAINLEVEL_OUTOF10: 7
PAINLEVEL_OUTOF10: 9
PAINLEVEL_OUTOF10: 9
PAINLEVEL_OUTOF10: 8

## 2018-11-27 NOTE — CARE PLAN
Problem: Infection  Goal: Will remain free from infection  Outcome: PROGRESSING AS EXPECTED  IV ABX completed, productive cough still present.       Problem: Pain Management  Goal: Pain level will decrease to patient's comfort goal  Outcome: PROGRESSING AS EXPECTED  Pain assessed using 0-10 pain scale.   Patient still reporting severe pain, providing IV morphine per MAR.   Encouraging non-pharm interventions.

## 2018-11-27 NOTE — PROGRESS NOTES
Patient states she is not passing gas and has only been having loose, small stools. Patient has not had a genuine BM in at least a week per pt. Patient has abdominal pain and distention. Patient also has low grade fever. Will notify hospitalist.

## 2018-11-27 NOTE — PROGRESS NOTES
Assumed care of pt w/ RN. Pain 9/10 in abdomen. Pain medication administered by night nurse. Patient A&Ox4. Numbness and tingling bilateral feet. Discussed plan of care with patient. Patient ambulates independently. Call light within reach. No complaints at this time.

## 2018-11-27 NOTE — PROGRESS NOTES
Patient reporting severe abdominal pain, medicated per MAR, pain management plan established.   Oxygen at baseline between 4-6L via nasal cannula.   Bruising to L lateral abdomen still present, seems diminished compared to last shift with patient. Abdomen still semi-firm with palpable firmness to spleen.   LBM 11/26 small per pt.  Numbness and tingling to bilateral heels, strength equal bilaterally in all extremities, PERLLA.   Midline catheter flushing without blood return.   POC discussed, no further needs at this time, call light within reach, bed alarm armed, treaded socks on.

## 2018-11-28 ENCOUNTER — PATIENT OUTREACH (OUTPATIENT)
Dept: HEALTH INFORMATION MANAGEMENT | Facility: OTHER | Age: 58
End: 2018-11-28

## 2018-11-28 VITALS
HEART RATE: 59 BPM | BODY MASS INDEX: 21.18 KG/M2 | DIASTOLIC BLOOD PRESSURE: 50 MMHG | TEMPERATURE: 98.9 F | HEIGHT: 68 IN | SYSTOLIC BLOOD PRESSURE: 121 MMHG | RESPIRATION RATE: 18 BRPM | OXYGEN SATURATION: 99 % | WEIGHT: 139.77 LBS

## 2018-11-28 PROBLEM — F11.20 NARCOTIC DEPENDENCE (HCC): Status: ACTIVE | Noted: 2018-11-20

## 2018-11-28 LAB
ALBUMIN SERPL BCP-MCNC: 3.7 G/DL (ref 3.2–4.9)
ALBUMIN/GLOB SERPL: 1.6 G/DL
ALP SERPL-CCNC: 35 U/L (ref 30–99)
ALT SERPL-CCNC: 22 U/L (ref 2–50)
ANION GAP SERPL CALC-SCNC: 6 MMOL/L (ref 0–11.9)
AST SERPL-CCNC: 33 U/L (ref 12–45)
BASOPHILS # BLD AUTO: 0.6 % (ref 0–1.8)
BASOPHILS # BLD: 0.04 K/UL (ref 0–0.12)
BILIRUB SERPL-MCNC: 0.4 MG/DL (ref 0.1–1.5)
BUN SERPL-MCNC: 16 MG/DL (ref 8–22)
CALCIUM SERPL-MCNC: 9.1 MG/DL (ref 8.5–10.5)
CHLORIDE SERPL-SCNC: 107 MMOL/L (ref 96–112)
CO2 SERPL-SCNC: 25 MMOL/L (ref 20–33)
CREAT SERPL-MCNC: 0.91 MG/DL (ref 0.5–1.4)
EOSINOPHIL # BLD AUTO: 0.16 K/UL (ref 0–0.51)
EOSINOPHIL NFR BLD: 2.6 % (ref 0–6.9)
ERYTHROCYTE [DISTWIDTH] IN BLOOD BY AUTOMATED COUNT: 48.6 FL (ref 35.9–50)
FERRITIN SERPL-MCNC: 71.5 NG/ML (ref 10–291)
GLOBULIN SER CALC-MCNC: 2.3 G/DL (ref 1.9–3.5)
GLUCOSE BLD-MCNC: 134 MG/DL (ref 65–99)
GLUCOSE BLD-MCNC: 143 MG/DL (ref 65–99)
GLUCOSE BLD-MCNC: 80 MG/DL (ref 65–99)
GLUCOSE SERPL-MCNC: 173 MG/DL (ref 65–99)
HCT VFR BLD AUTO: 40.4 % (ref 37–47)
HGB BLD-MCNC: 12.7 G/DL (ref 12–16)
IMM GRANULOCYTES # BLD AUTO: 0.04 K/UL (ref 0–0.11)
IMM GRANULOCYTES NFR BLD AUTO: 0.6 % (ref 0–0.9)
IRON SATN MFR SERPL: 24 % (ref 15–55)
IRON SERPL-MCNC: 86 UG/DL (ref 40–170)
LIPASE SERPL-CCNC: 39 U/L (ref 11–82)
LYMPHOCYTES # BLD AUTO: 0.66 K/UL (ref 1–4.8)
LYMPHOCYTES NFR BLD: 10.7 % (ref 22–41)
MCH RBC QN AUTO: 29.4 PG (ref 27–33)
MCHC RBC AUTO-ENTMCNC: 31.4 G/DL (ref 33.6–35)
MCV RBC AUTO: 93.5 FL (ref 81.4–97.8)
MONOCYTES # BLD AUTO: 0.18 K/UL (ref 0–0.85)
MONOCYTES NFR BLD AUTO: 2.9 % (ref 0–13.4)
NEUTROPHILS # BLD AUTO: 5.11 K/UL (ref 2–7.15)
NEUTROPHILS NFR BLD: 82.6 % (ref 44–72)
NRBC # BLD AUTO: 0 K/UL
NRBC BLD-RTO: 0 /100 WBC
PLATELET # BLD AUTO: 121 K/UL (ref 164–446)
PMV BLD AUTO: 9.8 FL (ref 9–12.9)
POTASSIUM SERPL-SCNC: 4 MMOL/L (ref 3.6–5.5)
PROT SERPL-MCNC: 6 G/DL (ref 6–8.2)
RBC # BLD AUTO: 4.32 M/UL (ref 4.2–5.4)
SODIUM SERPL-SCNC: 138 MMOL/L (ref 135–145)
TIBC SERPL-MCNC: 356 UG/DL (ref 250–450)
VIT B12 SERPL-MCNC: >1500 PG/ML (ref 211–911)
WBC # BLD AUTO: 6.2 K/UL (ref 4.8–10.8)

## 2018-11-28 PROCEDURE — 83690 ASSAY OF LIPASE: CPT

## 2018-11-28 PROCEDURE — 85025 COMPLETE CBC W/AUTO DIFF WBC: CPT

## 2018-11-28 PROCEDURE — 82607 VITAMIN B-12: CPT

## 2018-11-28 PROCEDURE — 700111 HCHG RX REV CODE 636 W/ 250 OVERRIDE (IP): Performed by: HOSPITALIST

## 2018-11-28 PROCEDURE — 700111 HCHG RX REV CODE 636 W/ 250 OVERRIDE (IP): Mod: JG | Performed by: HOSPITALIST

## 2018-11-28 PROCEDURE — 83540 ASSAY OF IRON: CPT

## 2018-11-28 PROCEDURE — 82728 ASSAY OF FERRITIN: CPT

## 2018-11-28 PROCEDURE — 99239 HOSP IP/OBS DSCHRG MGMT >30: CPT | Performed by: HOSPITALIST

## 2018-11-28 PROCEDURE — A9270 NON-COVERED ITEM OR SERVICE: HCPCS | Performed by: INTERNAL MEDICINE

## 2018-11-28 PROCEDURE — 700102 HCHG RX REV CODE 250 W/ 637 OVERRIDE(OP): Performed by: INTERNAL MEDICINE

## 2018-11-28 PROCEDURE — 80053 COMPREHEN METABOLIC PANEL: CPT

## 2018-11-28 PROCEDURE — A9270 NON-COVERED ITEM OR SERVICE: HCPCS | Performed by: HOSPITALIST

## 2018-11-28 PROCEDURE — 82962 GLUCOSE BLOOD TEST: CPT | Mod: 91

## 2018-11-28 PROCEDURE — 700111 HCHG RX REV CODE 636 W/ 250 OVERRIDE (IP): Performed by: INTERNAL MEDICINE

## 2018-11-28 PROCEDURE — 36415 COLL VENOUS BLD VENIPUNCTURE: CPT

## 2018-11-28 PROCEDURE — 83550 IRON BINDING TEST: CPT

## 2018-11-28 PROCEDURE — 700102 HCHG RX REV CODE 250 W/ 637 OVERRIDE(OP): Performed by: HOSPITALIST

## 2018-11-28 RX ORDER — ALPRAZOLAM 1 MG/1
1 TABLET ORAL 3 TIMES DAILY PRN
Qty: 15 TAB | Refills: 0
Start: 2018-11-28 | End: 2018-12-03

## 2018-11-28 RX ORDER — PREDNISONE 20 MG/1
TABLET ORAL
Qty: 3 TAB | Refills: 0 | Status: SHIPPED | OUTPATIENT
Start: 2018-11-29 | End: 2018-12-04

## 2018-11-28 RX ORDER — MORPHINE SULFATE 4 MG/ML
4 INJECTION, SOLUTION INTRAMUSCULAR; INTRAVENOUS ONCE
Status: COMPLETED | OUTPATIENT
Start: 2018-11-28 | End: 2018-11-28

## 2018-11-28 RX ORDER — ALPRAZOLAM 1 MG/1
1 TABLET ORAL 3 TIMES DAILY PRN
Qty: 15 TAB | Refills: 0 | Status: SHIPPED | OUTPATIENT
Start: 2018-11-28 | End: 2018-11-28

## 2018-11-28 RX ADMIN — GABAPENTIN 600 MG: 300 CAPSULE ORAL at 11:13

## 2018-11-28 RX ADMIN — Medication 81 MG: at 06:04

## 2018-11-28 RX ADMIN — TACROLIMUS 1.5 MG: 1 CAPSULE ORAL at 06:13

## 2018-11-28 RX ADMIN — MORPHINE SULFATE 4 MG: 10 INJECTION INTRAVENOUS at 10:41

## 2018-11-28 RX ADMIN — MORPHINE SULFATE 4 MG: 10 INJECTION INTRAVENOUS at 04:17

## 2018-11-28 RX ADMIN — MYCOPHENOLATE MOFETIL 500 MG: 250 CAPSULE ORAL at 06:05

## 2018-11-28 RX ADMIN — CALCIUM POLYCARBOPHIL 625 MG: 625 TABLET, FILM COATED ORAL at 06:15

## 2018-11-28 RX ADMIN — PREDNISONE 20 MG: 20 TABLET ORAL at 06:08

## 2018-11-28 RX ADMIN — AMBRISENTAN 10 MG: 10 TABLET, FILM COATED ORAL at 06:00

## 2018-11-28 RX ADMIN — TIOTROPIUM BROMIDE 1 CAPSULE: 18 CAPSULE ORAL; RESPIRATORY (INHALATION) at 06:10

## 2018-11-28 RX ADMIN — ALPRAZOLAM 1 MG: 1 TABLET ORAL at 08:08

## 2018-11-28 RX ADMIN — MORPHINE SULFATE 4 MG: 10 INJECTION INTRAVENOUS at 01:09

## 2018-11-28 RX ADMIN — OMEPRAZOLE 40 MG: 20 CAPSULE, DELAYED RELEASE ORAL at 06:07

## 2018-11-28 RX ADMIN — MORPHINE SULFATE 4 MG: 4 INJECTION INTRAVENOUS at 12:23

## 2018-11-28 RX ADMIN — BISACODYL 5 MG: 5 TABLET, COATED ORAL at 06:08

## 2018-11-28 RX ADMIN — OXYCODONE HYDROCHLORIDE AND ACETAMINOPHEN 500 MG: 500 TABLET ORAL at 06:12

## 2018-11-28 RX ADMIN — LEVOTHYROXINE SODIUM 137 MCG: 137 TABLET ORAL at 06:05

## 2018-11-28 RX ADMIN — BISACODYL 20 MG: 10 SUPPOSITORY RECTAL at 06:05

## 2018-11-28 RX ADMIN — FUROSEMIDE 20 MG: 20 TABLET ORAL at 06:08

## 2018-11-28 RX ADMIN — STANDARDIZED SENNA CONCENTRATE AND DOCUSATE SODIUM 2 TABLET: 8.6; 5 TABLET, FILM COATED ORAL at 06:09

## 2018-11-28 RX ADMIN — PRAVASTATIN SODIUM 20 MG: 20 TABLET ORAL at 06:07

## 2018-11-28 RX ADMIN — GABAPENTIN 600 MG: 300 CAPSULE ORAL at 06:07

## 2018-11-28 RX ADMIN — METHYLNALTREXONE BROMIDE 12 MG: 12 INJECTION, SOLUTION SUBCUTANEOUS at 06:09

## 2018-11-28 RX ADMIN — MORPHINE SULFATE 4 MG: 10 INJECTION INTRAVENOUS at 07:35

## 2018-11-28 ASSESSMENT — PAIN SCALES - GENERAL
PAINLEVEL_OUTOF10: 7
PAINLEVEL_OUTOF10: 9
PAINLEVEL_OUTOF10: 8
PAINLEVEL_OUTOF10: 9

## 2018-11-28 NOTE — PROGRESS NOTES
1114 Patient's sitting up in bed. Dr Adamson visited, POC discussed with the patient. Verbalized understanding.

## 2018-11-28 NOTE — PROGRESS NOTES
0650 Patient's sitting up in bed. Bedside rpeort reiceved from NOC RN () at the beginning of the shift. No distress noted.    0808 Patient's sitting up in bed, having Breakfast, rates LUQ pain 7/10, medicated by NOC RN (see MAR). . Fall Protocol in effect. Call light withhin reach. Reminded patient to call for assist. Due medication given. Assessment completed. No distgress noted. Plan of care reviewed with the patient. Verbalized understanding.

## 2018-11-28 NOTE — PROGRESS NOTES
Received report from am PENNY López at bedside, accepted care . Pt is calm and alert, speech clear, HOPE,  shows no signs of distress. Negative for headache, no N/V, Pt is breathing normally. No SOB, no chest pain.. POC discussed. Bed in low position call bell within reach, half side rails up, Bed alarm refused. Provided education, Pt resting quietly. Will continue to assess.

## 2018-11-28 NOTE — PROGRESS NOTES
0727: Pt transport picked up pt for trasport to CT, no distress, 4 L O2 in use.     0745:pt transported back to room, no distress noted.

## 2018-11-28 NOTE — DISCHARGE SUMMARY
"Discharge Summary    CHIEF COMPLAINT ON ADMISSION  Chief Complaint   Patient presents with   • Flank Pain   • Abdominal Pain   • Abdominal Swelling       Reason for Admission  Left Leg Pain      Admission Date  11/19/2018    CODE STATUS  Full Code    HPI & HOSPITAL COURSE  This is a 58 y.o. female admitted 11/19/2018 with cough, wheezing and shortness of breath.  Pt has long medical hx including ILD reportedly currently treated by pulmonologist at [facility], s/p gastric bypass, chronic immunosuppression s/p liver transplant, chronic pain syndrome, chronic constipation, sarcoidosis, history of sarcoidosis.  On presentation, CXR and clinical presentation indicative of possible bronchitis vs ILD/sarcoidosis \"flare\" so patient started on Roceph/Azithromycin as well as prednisone (refused IV steroids).      Pt had no clinical decompensation since hospitalization.  Pt has been seen by pulmnologist and [facility]m.  Agreed that patient doing ok and that no transfer is necessary as of right now although patient is in disagreement.       Infectious disease who knows patient well, recommends that Rocephin/Azithromycin x 5 days is sufficient since no airspace infiltrate obvious on CXR.  She is to continue with her probiotic daily since she has a history of C. Dificil.  No complaints of diarrhea while in hospital.  Patient was having formed stool.      11/27:  Still complaining of abdominal pain especially in left lower quadrant on palpation.  Still c/o small amount of stool despite daily relistor injections.  Repeat CT abdomen/pelvis with po and IV contrast was negative except for chronic changes, resolved extraperitoneal air.  She had  recent 2 weeks ago had left inguinal hernia repair by Dr. Ganser.  Lungs CTA b/l, updated patient and  at bedside plan of care. Decreased prednisone 30 to 20mg po daily since home dose is 7 mg daily.  Will continue to taper prednisone at discharge.  " Abdominal exam was benign at discharge with normal CBC, CMP, B12, iron levels.    Therefore, she is discharged in good and stable condition to home with close outpatient follow-up.    The patient met 2-midnight criteria for an inpatient stay at the time of discharge.    Discharge Date  11/28/2018    FOLLOW UP ITEMS POST DISCHARGE  Patient has a pain management appt. 11/29/18 per patient.    Follow up with Dr. Ganser for post op lap hernia repair recheck in 1 week.  Follow up with PCP in 1-2 weeks for recheck.    DISCHARGE DIAGNOSES  Principal Problem:    Acute and chronic respiratory failure with hypoxia due to bronchitis, ILD flare (HCC) POA: Yes  Active Problems:    Hepatopulmonary syndrome (HCC) POA: Yes    Status post liver transplant Dr. Canada ([facility]) POA: Yes      Overview: -December 2011: Status post liver transplant for end stage liver disease       at [facility], followed by Dr. Canada.          Pancytopenia (HCC) POA: Yes    Sarcoidosis (HCC) POA: Yes    Pulmonary hypertension (HCC) POA: Yes      Overview: Initial evaluation at [facility] pre liver transplant, PFO closed w/o       complication, 1/25/2011, then worse PAH and R HF post transplant,       extensive evaluations at [facility] in [place] with cath and drug trial, responding       to tadalafil and ambrisentan dramatically. Followed there with serial R       heart cath.      3/25/2014: Most recent R heart cath done Dr. Brenda Flores with mild       pulmonary hypertension and relatively high ouput      November 2014: Echocardiogram with normal LV size, LVEF 60-65%. Mild MR,       mild AI, mild TR, RVSP 29-34mmHg.      February 2015: Echocardiogram with mild concentric LVH, LVEF 65-70%. Mild       MR, mild AI, trace TR, RVSP 16mmHg.    Type 2 diabetes mellitus (HCC) POA: Yes    Opioid dependence (HCC) POA: Yes    History of Clostridium difficile infection POA: Yes    S/P panniculectomy POA: Yes  Resolved Problems:    Left lower quadrant pain POA:  Yes      FOLLOW UP  Future Appointments  Date Time Provider Department Center   12/4/2018 9:00 AM Elisa Phillip M.D. [facility] None   12/5/2018 8:00 AM [facility] None   1/7/2019 1:15 PM Maxwell Phan M.D. [facility] None   1/9/2019 8:00 AM [facility] None   2/13/2019 8:00 AM [facility] None   3/14/2019 10:30 AM [facility] None     Elisa Phillip M.D.  [address]  [address]  [facility] [address]  [phone]            MEDICATIONS ON DISCHARGE     Medication List      START taking these medications      Instructions   methylnaltrexone 12 MG/0.6ML Soln  Commonly known as:  RELISTOR   Inject 0.6 mL as instructed every day.  Dose:  12 mg        CHANGE how you take these medications      Instructions   ALPRAZolam 1 MG Tabs  What changed:  · when to take this  · reasons to take this  Commonly known as:  XANAX   Take 1 Tab by mouth 3 times a day as needed for Sleep for up to 5 days.  Dose:  1 mg     * predniSONE 1 MG Tabs  What changed:  Another medication with the same name was added. Make sure you understand how and when to take each.  Commonly known as:  DELTASONE   Take 2 mg by mouth every morning. Pt also has an RX for 5MG, pt takes total of 7MG QDAY  Dose:  2 mg     * predniSONE 5 MG Tabs  What changed:  Another medication with the same name was added. Make sure you understand how and when to take each.  Commonly known as:  DELTASONE   Take 5 mg by mouth every morning. Pt also has an RX for 2MG, pt takes total of 7MG QDAY  Dose:  5 mg     * predniSONE 20 MG Tabs  Start taking on:  11/29/2018  What changed:  You were already taking a medication with the same name, and this prescription was added. Make sure you understand how and when to take each.  Commonly known as:  DELTASONE   Take 20mg po daily for 2 days, then 10mg po daily for 2 days, then resume your home dose of 7 mg po daily.        * This list has 3 medication(s) that are the same as other medications  prescribed for you. Read the directions carefully, and ask your doctor or other care provider to review them with you.            CONTINUE taking these medications      Instructions   ascorbic acid 500 MG Tabs  Commonly known as:  ascorbic acid   Take 500 mg by mouth every morning.  Dose:  500 mg     aspirin 81 MG tablet   Take 1 Tab by mouth every day.  Dose:  81 mg     calcium polycarbophil 625 MG Tabs  Commonly known as:  FIBERCON   Take 625 mg by mouth every morning.  Dose:  625 mg     CELLCEPT 250 MG Caps  Generic drug:  mycophenolate   Doctor's comments:  liver transplant  Take 500 mg by mouth every 12 hours.  Dose:  500 mg     CITRACAL CALCIUM+D PO   Take 1 Tab by mouth every evening.  Dose:  1 Tab     [employer] GENTLE LAXATIVE 10 MG Supp  Generic drug:  bisacodyl   Insert 20 mg in rectum every morning.  Dose:  20 mg     docusate sodium 100 MG Caps  Commonly known as:  COLACE   Take 100 mg by mouth See Admin Instructions. Three times a day at 0700, 1200, and 1800  Dose:  100 mg     ferrous sulfate 220 (44 Fe) MG/5ML Elix  Commonly known as:  FEOSOL   Take 220 mg by mouth every evening.  Dose:  220 mg     furosemide 40 MG Tabs  Commonly known as:  LASIX   Take 20 mg by mouth every morning.  Dose:  20 mg     gabapentin 600 MG tablet  Commonly known as:  NEURONTIN   TAKE 1 TABLET BY MOUTH THREE TIMES A DAY     insulin glargine 100 UNIT/ML Soln  Commonly known as:  LANTUS   Inject 30 Units as instructed 2 times a day.  Dose:  30 Units     LETAIRIS 10 MG tablet  Generic drug:  ambrisentan   Take 10 mg by mouth every day.  Dose:  10 mg     levothyroxine 137 MCG Tabs  Commonly known as:  SYNTHROID   TAKE 1 TAB BY MOUTH EVERY MORNING ON AN EMPTY STOMACH.  Dose:  137 mcg     Linaclotide 290 MCG Caps  Commonly known as:  LINZESS   Take 1 Cap by mouth every day.  Dose:  1 Cap     midodrine 5 MG Tabs  Commonly known as:  PROAMATINE   Take 5-15 mg by mouth 1 time daily as needed.  Dose:  5-15 mg     NON SPECIFIED   Take 1 Cap  by mouth every bedtime. Bariatric Dietary Supplment  Dose:  1 Cap     omeprazole 40 MG delayed-release capsule  Commonly known as:  PRILOSEC   Take 1 Cap by mouth every day.  Dose:  40 mg     ondansetron 4 MG Tbdp  Commonly known as:  ZOFRAN ODT   Take 4 mg by mouth every 8 hours as needed for Nausea.  Dose:  4 mg     oxyCODONE immediate release 10 MG immediate release tablet  Commonly known as:  ROXICODONE   Take 10 mg by mouth 3 times a day.  Dose:  10 mg     pravastatin 20 MG Tabs  Commonly known as:  PRAVACHOL   Take 1 Tab by mouth every day.  Dose:  20 mg     PROBIOTIC DAILY PO   Take 1 Cap by mouth every evening.  Dose:  1 Cap     SENOKOT 8.6 MG Tabs  Generic drug:  sennosides   Take 17.2 mg by mouth every 12 hours.  Dose:  17.2 mg     sertraline 100 MG Tabs  Commonly known as:  ZOLOFT   Take 1 Tab by mouth every bedtime.  Dose:  100 mg     tacrolimus 0.5 MG Caps  Commonly known as:  PROGRAF   Take 0.5 mg by mouth every 12 hours. Pt also has an RX for 1MG, pt takes total of 1.5MG BID  Dose:  0.5 mg     tacrolimus 1 MG Caps  Commonly known as:  PROGRAF   Take 1 mg by mouth every 12 hours. Pt also has an RX for 0.5MG, pt takes total of 1.5MG BID  Dose:  1 mg     Tadalafil (PAH) 20 MG Tabs  Commonly known as:  ADCIRCA   Take 20 mg by mouth every bedtime.  Dose:  20 mg     tiotropium 18 MCG Caps  Commonly known as:  SPIRIVA HANDIHALER   Doctor's comments:  sample  Inhale 1 Cap by mouth every day.  Dose:  18 mcg     traZODone 50 MG Tabs  Commonly known as:  DESYREL   Take 2 Tabs by mouth every bedtime.  Dose:  100 mg            Allergies  Allergies   Allergen Reactions   • Rhubarb Anaphylaxis   • Organic Nitrates      Nitroglycerin products should be avoided with the use of PDE-5 inhibitors as the combination can result in severe hypotension.  RD clarified with pt: Nitrates in food are okay and pt does not want nitrates to be listed as food allergy. Pt only avoids medications with nitrates.    • Vancomycin Hcl       Causes red man syndrome when infused to fast     • Acetaminophen      Can not take, hx of liver transplant    • Nsaids      Can not take due to hx of liver transplant    • Other Drug      Any medication that may interact with cyclosporine, tacrolimus, sirolimus, or prograf (due to hx of liver transplant)        DIET  Orders Placed This Encounter   Procedures   • Diet Order Regular     Standing Status:   Standing     Number of Occurrences:   1     Order Specific Question:   Diet:     Answer:   Regular [1]       ACTIVITY  As tolerated.  Weight bearing as tolerated    CONSULTATIONS  Dr. Gonzalez    PROCEDURES    CT abdomen/pelvis with po and IV contrast:    1.  Resolution of free intraperitoneal air and there has been decrease in trace fluid adjacent to the upper abdominal wall. No rim-enhancing abscess is detected    2.  Numerous stable chronic findings including cirrhosis, portal hypertension, splenomegaly with hypodense indeterminant splenic masses, gastric bypass and hysterectomy   Reading Provider Reading Date   Bakari Woodson M.D. Nov 27, 2018     CT chest w/o:    Small left pleural effusion.    Left basilar atelectasis versus consolidation.    Linear right basilar opacities likely represent atelectasis.    Mild patchy and groundglass opacities in the right lower lobe are likely infectious/inflammatory. Ill-defined hazy nodules in the left upper lobe are likely infectious/inflammatory.    Postsurgical changes of the right hemithorax.    Stable 4 mm right lower lobe pulmonary nodule.    Small amount of pericardial fluid.    2.8 x 2.8 cm hypodense splenic lesion is unchanged compared to the prior examination. Splenomegaly is noted.   Reading Provider Reading Date   Latha Vieyra M.D. Nov 22, 2018         LABORATORY  Lab Results   Component Value Date    SODIUM 138 11/28/2018    POTASSIUM 4.0 11/28/2018    CHLORIDE 107 11/28/2018    CO2 25 11/28/2018    GLUCOSE 173 (H) 11/28/2018    BUN 16 11/28/2018     CREATININE 0.91 11/28/2018        Lab Results   Component Value Date    WBC 6.2 11/28/2018    HEMOGLOBIN 12.7 11/28/2018    HEMATOCRIT 40.4 11/28/2018    PLATELETCT 121 (L) 11/28/2018        Total time of the discharge process exceeds 45 minutes.

## 2018-11-28 NOTE — DISCHARGE INSTRUCTIONS
Discharge Instructions    Discharged to home by car with relative. Discharged via wheelchair, hospital escort: Yes.  Special equipment needed: Oxygen from home.    Be sure to schedule a follow-up appointment with your primary care doctor or any specialists as instructed.     Discharge Plan:   Diet Plan: Discussed  Activity Level: Discussed  Confirmed Follow up Appointment: Patient to Call and Schedule Appointment  Confirmed Symptoms Management: Discussed  Medication Reconciliation Updated: Yes  Influenza Vaccine Indication: Not indicated: Previously immunized this influenza season and > 8 years of age    I understand that a diet low in cholesterol, fat, and sodium is recommended for good health. Unless I have been given specific instructions below for another diet, I accept this instruction as my diet prescription.   Other diet: Regular    Special Instructions: Follow up with Dr Ganser ion one week, call for appointment    · Is patient discharged on Warfarin / Coumadin?   No     Depression / Suicide Risk    As you are discharged from this [facility] facility, it is important to learn how to keep safe from harming yourself.    Recognize the warning signs:  · Abrupt changes in personality, positive or negative- including increase in energy   · Giving away possessions  · Change in eating patterns- significant weight changes-  positive or negative  · Change in sleeping patterns- unable to sleep or sleeping all the time   · Unwillingness or inability to communicate  · Depression  · Unusual sadness, discouragement and loneliness  · Talk of wanting to die  · Neglect of personal appearance   · Rebelliousness- reckless behavior  · Withdrawal from people/activities they love  · Confusion- inability to concentrate     If you or a loved one observes any of these behaviors or has concerns about self-harm, here's what you can do:  · Talk about it- your feelings and reasons for harming yourself  · Remove any means that you  might use to hurt yourself (examples: pills, rope, extension cords, firearm)  · Get professional help from the community (Mental Health, Substance Abuse, psychological counseling)  · Do not be alone:Call your Safe Contact- someone whom you trust who will be there for you.  · Call your local CRISIS HOTLINE 572-0685 or 519-302-7418  · Call your local Children's Mobile Crisis Response Team [place] (177) 915-1074 or www.51aiya.com  · Call the toll free National Suicide Prevention Hotlines   · National Suicide Prevention Lifeline 119-256-ZJVP (5113)  · [employer] Hope Line Network 800-SUICIDE (821-5243)    Abdominal Pain, Adult  Many things can cause belly (abdominal) pain. Most times, belly pain is not dangerous. Many cases of belly pain can be watched and treated at home. Sometimes belly pain is serious, though. Your doctor will try to find the cause of your belly pain.  Follow these instructions at home:  · Take over-the-counter and prescription medicines only as told by your doctor. Do not take medicines that help you poop (laxatives) unless told to by your doctor.  · Drink enough fluid to keep your pee (urine) clear or pale yellow.  · Watch your belly pain for any changes.  · Keep all follow-up visits as told by your doctor. This is important.  Contact a doctor if:  · Your belly pain changes or gets worse.  · You are not hungry, or you lose weight without trying.  · You are having trouble pooping (constipated) or have watery poop (diarrhea) for more than 2-3 days.  · You have pain when you pee or poop.  · Your belly pain wakes you up at night.  · Your pain gets worse with meals, after eating, or with certain foods.  · You are throwing up and cannot keep anything down.  · You have a fever.  Get help right away if:  · Your pain does not go away as soon as your doctor says it should.  · You cannot stop throwing up.  · Your pain is only in areas of your belly, such as the right side or the left lower part of  the belly.  · You have bloody or black poop, or poop that looks like tar.  · You have very bad pain, cramping, or bloating in your belly.  · You have signs of not having enough fluid or water in your body (dehydration), such as:  ¨ Dark pee, very little pee, or no pee.  ¨ Cracked lips.  ¨ Dry mouth.  ¨ Sunken eyes.  ¨ Sleepiness.  ¨ Weakness.  This information is not intended to replace advice given to you by your health care provider. Make sure you discuss any questions you have with your health care provider.  Document Released: 06/05/2009 Document Revised: 07/07/2017 Document Reviewed: 05/31/2017  [employer] Patient Education © 2017 [employer]  Bronchitis  Bronchitis is the body's way of reacting to injury and/or infection (inflammation) of the bronchi. Bronchi are the air tubes that extend from the windpipe into the lungs. If the inflammation becomes severe, it may cause shortness of breath.  CAUSES   Inflammation may be caused by:  · A virus.  · Germs (bacteria).  · Dust.  · Allergens.  · Pollutants and many other irritants.  The cells lining the bronchial tree are covered with tiny hairs (cilia). These constantly beat upward, away from the lungs, toward the mouth. This keeps the lungs free of pollutants. When these cells become too irritated and are unable to do their job, mucus begins to develop. This causes the characteristic cough of bronchitis. The cough clears the lungs when the cilia are unable to do their job. Without either of these protective mechanisms, the mucus would settle in the lungs. Then you would develop pneumonia.  Smoking is a common cause of bronchitis and can contribute to pneumonia. Stopping this habit is the single most important thing you can do to help yourself.  TREATMENT   · Your caregiver may prescribe an antibiotic if the cough is caused by bacteria. Also, medicines that open up your airways make it easier to breathe. Your caregiver may also recommend or prescribe an  expectorant. It will loosen the mucus to be coughed up. Only take over-the-counter or prescription medicines for pain, discomfort, or fever as directed by your caregiver.  · Removing whatever causes the problem (smoking, for example) is critical to preventing the problem from getting worse.  · Cough suppressants may be prescribed for relief of cough symptoms.  · Inhaled medicines may be prescribed to help with symptoms now and to help prevent problems from returning.  · For those with recurrent (chronic) bronchitis, there may be a need for steroid medicines.  SEEK IMMEDIATE MEDICAL CARE IF:   · During treatment, you develop more pus-like mucus (purulent sputum).  · You have a fever.  · Your baby is older than 3 months with a rectal temperature of 102° F (38.9° C) or higher.  · Your baby is 3 months old or younger with a rectal temperature of 100.4° F (38° C) or higher.  · You become progressively more ill.  · You have increased difficulty breathing, wheezing, or shortness of breath.  It is necessary to seek immediate medical care if you are elderly or sick from any other disease.  MAKE SURE YOU:   · Understand these instructions.  · Will watch your condition.  · Will get help right away if you are not doing well or get worse.  Document Released: 12/18/2006 Document Revised: 03/11/2013 Document Reviewed: 10/27/2009  [employer]® Patient Information ©2014 [employer].

## 2018-11-28 NOTE — PROGRESS NOTES
"[facility] Hospitalist Progress Note    Date of Service: 11/27/2018    Chief Complaint  58 y.o. female admitted 11/19/2018 with cough, wheezing and shortness of breath.  Pt has long medical hx including ILD reportedly currently treated by pulmonologist at [facility], s/p gastric bypass, chronic immunosuppression s/p liver transplant, chronic pain syndrome.  On presentation, CXR and clinical presentation indicative of possible bronchitis vs ILD/sarcoidosis \"flare\" so patient started on Roceph/Azithromycin as well as prednisone (refused IV steroids).  Chart review indicates drug seeking bahavior as well.      Pt no clinical decompensation since hospitalization.  Pt has been seen by pulmnologist and discussion held between, [facility] pulm and [facility] pulm.  Agreed that patient doing ok and that no transfer is necessary as of right now although patient is disagreement.      Infectious disease who knows patient well, recommends that Rocephin/Azithromycin x 5 days is sufficient since no airspace infiltrate obvious on CXR.      Interval Problem Update  11/27:  Still complaining of abdominal pain especially in left lower quadrant on palpation.  Still c/o small amount of stool despite daily relistor injections.  Ordered CT abdomen/pelvis with po and IV contrast since recent 2 weeks ago had left inguinal hernia repair by Dr. Ganser.  Lungs CTA b/l, updated patient and  at bedside plan of care. Decreased prednisone 30 to 20mg po daily since home dose is 7 mg daily.  Will continue to taper.    Consultants/Specialty  none    Disposition  Home with  once medically cleared.        Review of Systems   Constitutional: Positive for malaise/fatigue.   HENT: Negative for sinus pain and sore throat.    Respiratory: Negative for cough, hemoptysis, sputum production, shortness of breath and wheezing.    Cardiovascular: Negative for chest pain, palpitations and leg swelling.   Gastrointestinal: Positive for abdominal pain and constipation. " Negative for blood in stool, diarrhea, heartburn, nausea and vomiting.   Genitourinary: Negative for dysuria and urgency.   Musculoskeletal: Negative for myalgias and neck pain.   Neurological: Negative for dizziness, focal weakness and headaches.   Psychiatric/Behavioral: The patient is nervous/anxious.       Physical Exam  Laboratory/Imaging   Hemodynamics  Temp (24hrs), Av.3 °C (99.1 °F), Min:36.8 °C (98.3 °F), Max:37.7 °C (99.8 °F)   Temperature: 37.5 °C (99.5 °F)  Pulse  Av.8  Min: 50  Max: 89    Blood Pressure: 113/56      Respiratory      Respiration: 16, Pulse Oximetry: 96 %     Work Of Breathing / Effort: Mild  RUL Breath Sounds: Clear, RML Breath Sounds: Clear, RLL Breath Sounds: Clear, MANJINDER Breath Sounds: Clear, LLL Breath Sounds: Clear    Fluids    Intake/Output Summary (Last 24 hours) at 18 1923  Last data filed at 18 0600   Gross per 24 hour   Intake              500 ml   Output                0 ml   Net              500 ml       Nutrition  Orders Placed This Encounter   Procedures   • Diet Order Regular     Standing Status:   Standing     Number of Occurrences:   1     Order Specific Question:   Diet:     Answer:   Regular [1]     Physical Exam   Constitutional: She is oriented to person, place, and time. She appears well-developed and well-nourished.   HENT:   Head: Normocephalic and atraumatic.   Eyes: Pupils are equal, round, and reactive to light. Conjunctivae are normal. No scleral icterus.   Neck: Neck supple. No JVD present.   Cardiovascular: Regular rhythm.  Exam reveals no gallop.    Pulmonary/Chest: Effort normal. No respiratory distress. She has no wheezes. She has no rales. She exhibits no tenderness.   Coarse   Abdominal: Soft. She exhibits no distension and no mass. There is tenderness. There is guarding.   Musculoskeletal: Normal range of motion. She exhibits no edema or tenderness.   Neurological: She is alert and oriented to person, place, and time. No cranial  nerve deficit.   Skin: Skin is warm.   Psychiatric: She has a normal mood and affect. Her behavior is normal. Judgment and thought content normal.   Nursing note and vitals reviewed.      Recent Labs      11/25/18   0230  11/26/18   0204   WBC  5.4  4.3*   RBC  3.62*  3.60*   HEMOGLOBIN  10.9*  10.9*   HEMATOCRIT  33.2*  34.1*   MCV  91.7  94.7   MCH  30.1  30.3   MCHC  32.8*  32.0*   RDW  47.9  50.4*   PLATELETCT  99*  95*   MPV  9.4  9.3     Recent Labs      11/25/18   0230  11/26/18   0204   SODIUM  141  143   POTASSIUM  4.0  3.8   CHLORIDE  108  111   CO2  28  26   GLUCOSE  106*  114*   BUN  18  18   CREATININE  0.87  0.73   CALCIUM  8.6  8.4*                      Assessment/Plan     * Acute and chronic respiratory failure with hypoxia due to bronchitis, ILD flare (HCC)- (present on admission)   Assessment & Plan    -short course of steroids planned  - patient refusing higher dose of steroids or IV steroids on admission  -Continue her on bronchodilators per RT protocol.   - Continue oxygen supplements, keep saturations above 88%.  Continue home dose Spiriva.  -Patient claims that her pulmonologist is calling her, but we have not received any calls from [facility].  Currently, there is no indication to transfer her to a higher level of care.  -spoke with Dr. Gonzalez, pt no current clinical decompensation will c/w current tx  -increased Xanax to qid   11/27:  Started taper prednisone 30 to 20mg po daily, home dose is 7 mg daily.       Bronchitis- (present on admission)   Assessment & Plan    -Continue IV ceftriaxone and azithromycin, d4  -plan to complete 5 days course of antibiotics, curbside ID done.  -continue w/ duonebs  -wean oxygen to maintain oxygen saturation between 88-92%  Close monitoring        Drug-seeking behavior, with narcotic dependence- (present on admission)   Assessment & Plan    -Continue  IV morphine 4mg every 3 hours PRN.   Monitor      Type 2 diabetes mellitus (HCC)- (present on admission)  "  Assessment & Plan    -Allegedly was \"cured\" with weight loss.  Has steroid hyperglycemia.   -Start sliding scale insulin coverage.  Anticipate blood sugar will improve with transition to oral prednisone.  Accu-Chek before meals and at bedtime.     Left lower quadrant pain- (present on admission)   Assessment & Plan      -Continue IV morphine 4 mg every 3hours PRN.   11/17:  Persistent c/o abdominal pain daily, ordered CT abdomen/pelvis with po and iV contrast.  Had recent lap hernia repair 2 weeks ago by Dr. Ganser.  Admission CT abdomen with peritoneal air seen likely due to post op.  Prior Evon-en-Y, liver transplant.     Sarcoidosis (HCC)- (present on admission)   Assessment & Plan    Goes to [facility] Sarcoid clinic.       Pancytopenia (HCC)- (present on admission)   Assessment & Plan    -Due to liver transplant, and splenomegaly.  Stable. Monitor blood counts closely.     Status post liver transplant Dr. [place] ([facility])- (present on admission)   Assessment & Plan    -cont  outpatient medication  tacrolimus and mycophenolate.  Continue midodrine.       Quality-Core Measures   Reviewed items::  Labs reviewed, Radiology images reviewed and Medications reviewed  Holbrook catheter::  No Holbrook  Antibiotics:  Treating active infection/contamination beyond 24 hours perioperative coverage        "

## 2018-11-28 NOTE — PROGRESS NOTES
1041 Patient's in bed, medicated with Morphine (see MAR) for c/o's left upper quadrant pain (abdomen)  rates pain 8/10.

## 2018-11-28 NOTE — CARE PLAN
Problem: Safety  Goal: Will remain free from injury  Outcome: PROGRESSING AS EXPECTED  Safety precaution in effect. Call light within reach. Reminded patient to call for assist. Hourly rounds in effect. Verbalized understanding.    Problem: Infection  Goal: Will remain free from infection  Outcome: PROGRESSING AS EXPECTED  Implement Standard precaution. Hand washing every encounter & before & after patient care. Verbalized understanding.    Problem: Knowledge Deficit  Goal: Knowledge of disease process/condition, treatment plan, diagnostic tests, and medications will improve  Outcome: PROGRESSING AS EXPECTED  Discussed Plan of care. Questions answered. Verbalized understanding.    Problem: Pain Management  Goal: Pain level will decrease to patient's comfort goal    Intervention: Follow pain managment plan developed in collaboration with patient and Interdisciplinary Team  Outcome : Progressing as expected.                    Educated on pain scale. Encouraged to verbalize pain. Will medicate as per MAR.

## 2018-11-28 NOTE — PROGRESS NOTES
1223 Medicated with Morphine (see MAR) as a one time order prior to d/c home.    1250 Discharge instructions given to the patient & spouse. Questions answered. Patient was discharged with patient's belongings, own medications from home (Linzess, Letairis, Adcirca), own Oxygen from home via w/c accompanied by one female Unit Clerk & spouse via Private car.

## 2018-11-28 NOTE — CARE PLAN
Problem: Communication  Goal: The ability to communicate needs accurately and effectively will improve  Outcome: PROGRESSING AS EXPECTED  Pt able to voice needs to staff, call light in use and in reach, hourly rounds, educated on POC, will continue to assess    Problem: Safety  Goal: Will remain free from injury  Outcome: PROGRESSING AS EXPECTED  Precautions in place, no injury noted, pt calls appropriately, call light in reach,hourly rounding

## 2018-11-29 ENCOUNTER — PATIENT OUTREACH (OUTPATIENT)
Dept: HEALTH INFORMATION MANAGEMENT | Facility: OTHER | Age: 58
End: 2018-11-29

## 2018-11-29 NOTE — PROGRESS NOTES
Outbound call to [facility] for post discharge medication review. Spoke with patient's  Otis who helps to manage her medications. Otis confirms that patient has been receiving Relistor injections for awhile and is comfortable with injection technique. Reviewed prednisone taper instructions. Otis states patient has been on her regimen for a long time and denies medication questions/ concerns at this time.     See completed medication review pharmacy follow-up [facility] hyperlink for additional documentation.     Cheli Petit, PharmD

## 2018-12-02 DIAGNOSIS — J96.11 CHRONIC RESPIRATORY FAILURE WITH HYPOXIA (HCC): ICD-10-CM

## 2018-12-03 DIAGNOSIS — I10 ESSENTIAL HYPERTENSION: Primary | ICD-10-CM

## 2018-12-03 LAB — EKG IMPRESSION: NORMAL

## 2018-12-03 RX ORDER — TIOTROPIUM BROMIDE 18 UG/1
CAPSULE ORAL; RESPIRATORY (INHALATION)
Qty: 30 CAP | Refills: 6 | Status: SHIPPED | OUTPATIENT
Start: 2018-12-03 | End: 2019-07-05 | Stop reason: SDUPTHER

## 2018-12-04 ENCOUNTER — OFFICE VISIT (OUTPATIENT)
Dept: MEDICAL GROUP | Facility: LAB | Age: 58
End: 2018-12-04
Payer: MEDICARE

## 2018-12-04 ENCOUNTER — TELEPHONE (OUTPATIENT)
Dept: MEDICAL GROUP | Facility: LAB | Age: 58
End: 2018-12-04

## 2018-12-04 VITALS
HEART RATE: 63 BPM | WEIGHT: 137 LBS | RESPIRATION RATE: 16 BRPM | SYSTOLIC BLOOD PRESSURE: 124 MMHG | OXYGEN SATURATION: 96 % | BODY MASS INDEX: 20.76 KG/M2 | HEIGHT: 68 IN | TEMPERATURE: 98.8 F | DIASTOLIC BLOOD PRESSURE: 66 MMHG

## 2018-12-04 DIAGNOSIS — E27.1 ADDISON'S DISEASE (HCC): ICD-10-CM

## 2018-12-04 DIAGNOSIS — T40.2X5A CONSTIPATION DUE TO OPIOID THERAPY: ICD-10-CM

## 2018-12-04 DIAGNOSIS — D69.6 THROMBOCYTOPENIA (HCC): ICD-10-CM

## 2018-12-04 DIAGNOSIS — J96.11 CHRONIC RESPIRATORY FAILURE WITH HYPOXIA (HCC): ICD-10-CM

## 2018-12-04 DIAGNOSIS — F41.1 GAD (GENERALIZED ANXIETY DISORDER): ICD-10-CM

## 2018-12-04 DIAGNOSIS — D47.2 MGUS (MONOCLONAL GAMMOPATHY OF UNKNOWN SIGNIFICANCE): ICD-10-CM

## 2018-12-04 DIAGNOSIS — D84.9 IMMUNOCOMPROMISED STATE (HCC): ICD-10-CM

## 2018-12-04 DIAGNOSIS — Z94.4 STATUS POST LIVER TRANSPLANT (HCC): ICD-10-CM

## 2018-12-04 DIAGNOSIS — Z23 NEED FOR VACCINATION: ICD-10-CM

## 2018-12-04 DIAGNOSIS — Z79.891 CHRONIC USE OF OPIATE DRUGS THERAPEUTIC PURPOSES: ICD-10-CM

## 2018-12-04 DIAGNOSIS — K59.03 CONSTIPATION DUE TO OPIOID THERAPY: ICD-10-CM

## 2018-12-04 DIAGNOSIS — K76.81 HEPATOPULMONARY SYNDROME (HCC): ICD-10-CM

## 2018-12-04 DIAGNOSIS — I35.1 MILD AORTIC REGURGITATION: ICD-10-CM

## 2018-12-04 DIAGNOSIS — D86.9 SARCOIDOSIS: ICD-10-CM

## 2018-12-04 DIAGNOSIS — R73.9 STEROID-INDUCED HYPERGLYCEMIA: ICD-10-CM

## 2018-12-04 DIAGNOSIS — I27.20 PULMONARY HYPERTENSION (HCC): ICD-10-CM

## 2018-12-04 DIAGNOSIS — T38.0X5A STEROID-INDUCED HYPERGLYCEMIA: ICD-10-CM

## 2018-12-04 PROBLEM — J06.9 VIRAL UPPER RESPIRATORY TRACT INFECTION: Status: RESOLVED | Noted: 2018-08-28 | Resolved: 2018-12-04

## 2018-12-04 PROBLEM — A41.9 SEPSIS (HCC): Status: RESOLVED | Noted: 2017-10-29 | Resolved: 2018-12-04

## 2018-12-04 PROBLEM — E11.9 TYPE 2 DIABETES MELLITUS (HCC): Status: RESOLVED | Noted: 2018-11-21 | Resolved: 2018-12-04

## 2018-12-04 PROBLEM — B34.9 VIRAL SYNDROME: Status: RESOLVED | Noted: 2018-10-28 | Resolved: 2018-12-04

## 2018-12-04 PROBLEM — J96.21 ACUTE AND CHRONIC RESPIRATORY FAILURE WITH HYPOXIA (HCC): Status: RESOLVED | Noted: 2018-11-20 | Resolved: 2018-12-04

## 2018-12-04 PROCEDURE — 90471 IMMUNIZATION ADMIN: CPT | Performed by: FAMILY MEDICINE

## 2018-12-04 PROCEDURE — 90750 HZV VACC RECOMBINANT IM: CPT | Performed by: FAMILY MEDICINE

## 2018-12-04 PROCEDURE — 90632 HEPA VACCINE ADULT IM: CPT | Performed by: FAMILY MEDICINE

## 2018-12-04 PROCEDURE — 99214 OFFICE O/P EST MOD 30 MIN: CPT | Mod: 25 | Performed by: FAMILY MEDICINE

## 2018-12-04 PROCEDURE — 90472 IMMUNIZATION ADMIN EACH ADD: CPT | Performed by: FAMILY MEDICINE

## 2018-12-04 RX ORDER — ALPRAZOLAM 1 MG/1
1 TABLET ORAL 3 TIMES DAILY PRN
Qty: 90 TAB | Refills: 2 | Status: SHIPPED | OUTPATIENT
Start: 2018-12-04 | End: 2019-01-03

## 2018-12-04 RX ORDER — FUROSEMIDE 40 MG/1
40 TABLET ORAL EVERY MORNING
Qty: 90 TAB | Refills: 2 | Status: SHIPPED | OUTPATIENT
Start: 2018-12-04 | End: 2019-03-04

## 2018-12-04 RX ORDER — HYDROMORPHONE HYDROCHLORIDE 4 MG/1
TABLET ORAL
Refills: 0 | COMMUNITY
Start: 2018-11-30 | End: 2019-01-30

## 2018-12-04 NOTE — TELEPHONE ENCOUNTER
MEDICATION PRIOR AUTHORIZATION NEEDED:    1. Name of Medication: methylnaltrexone (RELISTOR) 12 MG/0.6ML Solution    2. Requested By (Name of Pharmacy): [facility]      3. Is insurance on file current? Yes    4. What is the name & phone number of the 3rd party payor? [email or web address]   Key: [id number]

## 2018-12-05 PROBLEM — I95.9 HYPOTENSION: Status: RESOLVED | Noted: 2018-10-28 | Resolved: 2018-12-05

## 2018-12-05 NOTE — ASSESSMENT & PLAN NOTE
Patient is followed by [facility] for this.  She is status post liver transplant.

## 2018-12-05 NOTE — ASSESSMENT & PLAN NOTE
Patient remains very anxious secondary to her complex medical problems.  She is taking the alprazolam 3 times daily.  She is planning to wean off her opiods early next year with her pain management physician.

## 2018-12-05 NOTE — PROGRESS NOTES
Subjective:     Chief Complaint   Patient presents with   • Hospital Follow-up       Roxana Cuba is a 58 y.o. female here today for evaluation and management of:    Chronic use of opiate drugs therapeutic purposes  Hydromorphone 2-4 mg every 6 hours with pain management    MGUS (monoclonal gammopathy of unknown significance)  Patient does not have multiple myeloma but rather has MGUS that has been stable and is followed by [facility].    Immunocompromised state ([facility])  Patient is on immunosuppressive medications post liver transplant so is immunocompromised.  She was recently hospitalized with bronchitis.      Steroid-induced hyperglycemia  Patient has steroid induced hyperglycemia not diabetes mellitus.  This is controlled with dietary changes.    [place]'s disease (CMS-[facility]) ([facility])  Patient has primary adrenal insufficiency.  Blood pressure has been very low at night - 60/40.  She was hospitalized 4/27/18 for 3 days and was started on midodrine 5 mg - which she takes 2 at bedtime and sometimes once during the day.  She is having some dizziness.    DUC (generalized anxiety disorder)  Patient remains very anxious secondary to her complex medical problems.  She is taking the alprazolam 3 times daily.  She is planning to wean off her opiods early next year with her pain management physician.    Pulmonary hypertension ([facility])  Patient has stable pulmonary hypertensive that was first diagnosed in 2011.      Mild aortic regurgitation and aortic valve sclerosis  9/18 Echocardiogram  Aortic Valve  Calcified aortic valve leaflets. No aortic stenosis. Moderate aortic Insufficiency.  Normal left ventricular chamber size. Normal left ventricular wall   thickness. Normal left ventricular systolic function. Left ventricular ejection fraction is visually estimated to be 70%. Normal regional wall motion. Normal diastolic function.    Chronic respiratory failure with hypoxia ([facility])  Patient is on oxygen 24/7.    Hepatopulmonary syndrome  (HCC)  Patient is followed by [facility] for this.  She is status post liver transplant.       Allergies   Allergen Reactions   • Rhubarb Anaphylaxis   • Organic Nitrates      Nitroglycerin products should be avoided with the use of PDE-5 inhibitors as the combination can result in severe hypotension.  RD clarified with pt: Nitrates in food are okay and pt does not want nitrates to be listed as food allergy. Pt only avoids medications with nitrates.    • Vancomycin Hcl      Causes red man syndrome when infused to fast     • Acetaminophen      Can not take, hx of liver transplant    • Nsaids      Can not take due to hx of liver transplant    • Other Drug      Any medication that may interact with cyclosporine, tacrolimus, sirolimus, or prograf (due to hx of liver transplant)        Current medicines (including changes today)  Current Outpatient Prescriptions   Medication Sig Dispense Refill   • HYDROmorphone (DILAUDID) 4 MG Tab TAKE 1/2 TO 1 TAB BY MOUTH EVERY 6 HOURS AS NEEDED FOR MODERATE TO SEVERE PAIN  0   • ALPRAZolam (XANAX) 1 MG Tab Take 1 Tab by mouth 3 times a day as needed for Anxiety for up to 30 days. 90 Tab 2   • methylnaltrexone (RELISTOR) 12 MG/0.6ML Solution Inject 0.6 mL as instructed every day. 84 Syringe 3   • SPIRIVA HANDIHALER 18 MCG Cap INHALE 1 CAPSULE VIA HANDIHALER ONCE DAILY AT THE SAME TIME EVERY DAY 30 Cap 6   • levothyroxine (SYNTHROID) 137 MCG Tab TAKE 1 TAB BY MOUTH EVERY MORNING ON AN EMPTY STOMACH. 90 Tab 0   • Tadalafil, PAH, (ADCIRCA) 20 MG Tab Take 20 mg by mouth every bedtime. 90 Tab 3   • sennosides (SENOKOT) 8.6 MG Tab Take 17.2 mg by mouth every 12 hours.     • gabapentin (NEURONTIN) 600 MG tablet TAKE 1 TABLET BY MOUTH THREE TIMES A DAY 90 Tab 1   • ambrisentan (LETAIRIS) 10 MG tablet Take 10 mg by mouth every day.     • ferrous sulfate (FEOSOL) 220 (44 Fe) MG/5ML Elixir Take 220 mg by mouth every evening.     • ondansetron (ZOFRAN ODT) 4 MG TABLET DISPERSIBLE Take 4 mg by mouth  every 8 hours as needed for Nausea.     • predniSONE (DELTASONE) 5 MG Tab Take 5 mg by mouth every morning. Pt also has an RX for 2MG, pt takes total of 7MG QDAY      • tacrolimus (PROGRAF) 1 MG Cap Take 1 mg by mouth every 12 hours. Pt also has an RX for 0.5MG, pt takes total of 1.5MG BID      • Probiotic Product (PROBIOTIC DAILY PO) Take 1 Cap by mouth every evening.     • traZODone (DESYREL) 50 MG Tab Take 2 Tabs by mouth every bedtime. 180 Tab 1   • pravastatin (PRAVACHOL) 20 MG Tab Take 1 Tab by mouth every day. 90 Tab 2   • Calcium-Magnesium-Vitamin D (CITRACAL CALCIUM+D PO) Take 1 Tab by mouth every evening.     • docusate sodium (COLACE) 100 MG Cap Take 100 mg by mouth See Admin Instructions. Three times a day at 0700, 1200, and 1800     • predniSONE (DELTASONE) 1 MG Tab Take 2 mg by mouth every morning. Pt also has an RX for 5MG, pt takes total of 7MG QDAY     • omeprazole (PRILOSEC) 40 MG delayed-release capsule Take 1 Cap by mouth every day. 90 Cap 0   • aspirin 81 MG tablet Take 1 Tab by mouth every day. 90 Tab 0   • Linaclotide (LINZESS) 290 MCG Cap Take 1 Cap by mouth every day. 90 Cap 3   • sertraline (ZOLOFT) 100 MG Tab Take 1 Tab by mouth every bedtime. 90 Tab 4   • NON SPECIFIED Take 1 Cap by mouth every bedtime. Bariatric Dietary Supplment      • tacrolimus (PROGRAF) 0.5 MG Cap Take 0.5 mg by mouth every 12 hours. Pt also has an RX for 1MG, pt takes total of 1.5MG BID     • ascorbic acid (ASCORBIC ACID) 500 MG Tab Take 500 mg by mouth every morning.     • mycophenolate (CELLCEPT) 250 MG Cap Take 500 mg by mouth every 12 hours.     • furosemide (LASIX) 40 MG Tab Take 1 Tab by mouth every morning. 90 Tab 2   • oxyCODONE immediate release (ROXICODONE) 10 MG immediate release tablet Take 10 mg by mouth 3 times a day.     • calcium polycarbophil (FIBERCON) 625 MG Tab Take 625 mg by mouth every morning.     • midodrine (PROAMATINE) 5 MG Tab Take 5-15 mg by mouth 1 time daily as needed.     • bisacodyl  ([employer] GENTLE LAXATIVE) 10 MG Suppos Insert 20 mg in rectum every morning.       No current facility-administered medications for this visit.        She  has a past medical history of Anemia; Anesthesia; Breath shortness; Bronchitis ( ); Cardiomegaly; Chronic fatigue and malaise; Cirrhosis ([facility]) (December 2011); CKD (chronic kidney disease) stage 3, GFR 30-59 ml/min ([facility]); Diabetes (HCC); Fracture of left foot; GERD (gastroesophageal reflux disease); H/O Clostridium difficile infection (02-17-17); Hemorrhagic disorder ([facility]); Hiatus hernia syndrome; High cholesterol; Hypothyroid; Jaundice (2009); Liver transplanted ([facility]); Low back pain (02-17-17); Mild aortic regurgitation and aortic valve sclerosis ( ); On home oxygen therapy; Platelet disorder ([facility]); Pneumonia; Psychiatric disorder; Pulmonary hypertension ([facility]); S/P cholecystectomy; Small bowel obstruction ([facility]); Splenomegaly; and VRE (vancomycin-resistant Enterococci).    Patient Active Problem List    Diagnosis Date Noted   • Pure hypercholesterolemia 12/09/2014     Priority: High   • Mild aortic regurgitation and aortic valve sclerosis 03/17/2014     Priority: High   • Orthostatic hypotension 10/27/2017     Priority: Medium   • Immunocompromised state ([facility]) 11/14/2015     Priority: Medium   • Chronic respiratory failure with hypoxia ([facility]) 11/13/2014     Priority: Medium   • Vitamin D deficiency 08/26/2014     Priority: Medium   • Ostium secundum type atrial septal defect, S/P percutaneous closure 03/17/2014     Priority: Medium   • Hepatopulmonary syndrome (HCC) 03/05/2014     Priority: Medium   • CKD (chronic kidney disease) stage 3, GFR 30-59 ml/min- unclear cause, probably multifactorial 02/25/2014     Priority: Medium   • Chronic obstructive pulmonary disease with acute exacerbation ([facility]) 11/14/2013     Priority: Medium   • MGUS (monoclonal gammopathy of unknown significance) 11/14/2013     Priority: Medium   • History of pancreatitis 06/20/2012     Priority:  "Medium   • Pulmonary hypertension (HCC) 02/03/2015     Priority: Low   • Sarcoidosis (HCC) 11/14/2013     Priority: Low   • DUC (generalized anxiety disorder) 11/04/2013     Priority: Low   • Primary insomnia 11/04/2013     Priority: Low   • Lower extremity weakness 06/26/2013     Priority: Low   • Status post liver transplant Dr. [place] ([facility]) 03/13/2012     Priority: Low   • Hypothyroidism, adult 03/13/2012     Priority: Low   • Nasal valve collapse 10/04/2018   • High risk medication use 08/28/2018   • History of aspiration pneumonia 02/06/2018   • Hiatal hernia 02/06/2018   • Chronic pain 01/23/2018   • Thrombocytopenia ([facility]) 01/21/2018   • [place]'s disease ([facility]) 11/03/2017   • Constipation due to opioid therapy 11/03/2017   • S/P panniculectomy 10/31/2017   • Steroid-induced hyperglycemia 10/03/2017   • Depression 07/14/2017   • SBO (small bowel obstruction) ([facility]) 07/14/2017   • History of Clostridium difficile infection 07/14/2017   • VRE (vancomycin-resistant Enterococci) 07/14/2017   • Herpes zoster without complication 07/14/2017   • Chronic prescription benzodiazepine use 04/12/2017   • Chronic use of opiate drugs therapeutic purposes 04/12/2017   • Opioid dependence ([facility]) 03/22/2017   • Iron deficiency 01/20/2017   • GERD (gastroesophageal reflux disease) 01/04/2017   • Bradycardia 11/04/2016   • Other allergic rhinitis 07/22/2016   • Chronic pain syndrome 06/15/2016   • Back pain 02/22/2016   • Splenic lesion 11/14/2015   • Mild mitral regurgitation 07/14/2015   • Splenomegaly 07/14/2015   • On prednisone therapy 07/06/2015   • H/O non-ST elevation myocardial infarction (NSTEMI) 05/16/2013       ROS   No fever or chills.  No nausea or vomiting.  No chest pain or palpitations.  No pain with urination or hematuria.  No black or bloody stools.       Objective:     Blood pressure 124/66, pulse 63, temperature 37.1 °C (98.8 °F), temperature source Temporal, resp. rate 16, height 1.727 m (5' 8\"), " weight 62.1 kg (137 lb), last menstrual period 01/03/2000, SpO2 96 %, not currently breastfeeding. Body mass index is 20.83 kg/m².   Physical Exam:  Well developed, well nourished.  Alert, oriented in no acute distress.  Eye contact is good, speech goal directed, affect calm  Eyes: conjunctiva non-injected, sclera non-icteric.  Ears: Pinna normal. TM pearly gray.   Nose: Nares are patent.  Normal mucosa  Mouth: Oral mucous membranes pink and moist with no lesions.  Neck Supple.  No adenopathy or masses in the neck or supraclavicular regions. No thyromegaly  Lungs: clear to auscultation bilaterally with good excursion. No wheezes or rhonchi  CV: regular rate and rhythm. No murmur          Assessment and Plan:   The following treatment plan was discussed    1. Need for vaccination    - Shingles Vaccine (Shingrix)  - Hep A Adult 19+    2. Constipation due to opioid therapy  Continue Relistor while on antibiotics  - methylnaltrexone (RELISTOR) 12 MG/0.6ML Solution; Inject 0.6 mL as instructed every day.  Dispense: 84 Syringe; Refill: 3    3. Chronic use of opiate drugs therapeutic purposes  Follow up with pain management as scheduled    4. DUC (generalized anxiety disorder)  Refill alprazolam.  [facility] reviewed.  Controlled substance agreement on chart  - ALPRAZolam (XANAX) 1 MG Tab; Take 1 Tab by mouth 3 times a day as needed for Anxiety for up to 30 days.  Dispense: 90 Tab; Refill: 2    5. Hepatopulmonary syndrome (HCC)  This is a chronic medical condition that is currently stable  Continue follow-up with [facility]    6. Chronic respiratory failure with hypoxia (HCC)  This is a chronic medical condition that is currently stable  Continue follow-up of pulmonology  Continue oxygen    7. Immunocompromised state (HCC)  This is a chronic medical condition that is currently stable  Patient is on an immunosuppressants    8. Sarcoidosis (HCC)  This is a chronic medical condition that is currently stable  Follow-up with  pulmonology    9. Status post liver transplant Dr. [place] ([facility])  This is a chronic medical condition that is currently stable  Continue follow-up with [facility]    10. Thrombocytopenia (HCC)  This is a chronic medical condition that is currently stable  Continue to monitor    11. Steroid-induced hyperglycemia  This is a chronic medical condition that is currently stable  Continue to monitor  - HEMOGLOBIN A1C; Future    12. MGUS (monoclonal gammopathy of unknown significance)    This is a chronic medical condition that is currently stable  Continue follow-up with [facility]    13. [place]'s disease (HCC)  This is a chronic medical condition that is currently stable  Continue midodrine as directed    14. Pulmonary hypertension (HCC)  This is a chronic medical condition that is currently stable  Follow up with pulmonology as scheduled    15. Mild aortic regurgitation and aortic valve sclerosis  This is a chronic medical condition that is currently stable  follow-up with cardiology as scheduled    Any change or worsening of signs or symptoms, patient encouraged to follow-up or report to the emergency room for further evaluation. Patient understands and agrees.    Followup: Return in about 3 months (around 2/27/2019).

## 2018-12-05 NOTE — ASSESSMENT & PLAN NOTE
Patient has steroid induced hyperglycemia not diabetes mellitus.  This is controlled with dietary changes.

## 2018-12-05 NOTE — ASSESSMENT & PLAN NOTE
Patient has primary adrenal insufficiency.  Blood pressure has been very low at night - 60/40.  She was hospitalized 4/27/18 for 3 days and was started on midodrine 5 mg - which she takes 2 at bedtime and sometimes once during the day.  She is having some dizziness.

## 2018-12-05 NOTE — ASSESSMENT & PLAN NOTE
Patient does not have multiple myeloma but rather has MGUS that has been stable and is followed by [facility].

## 2018-12-05 NOTE — ASSESSMENT & PLAN NOTE
Patient is on immunosuppressive medications post liver transplant so is immunocompromised.  She was recently hospitalized with bronchitis.

## 2018-12-05 NOTE — ASSESSMENT & PLAN NOTE
9/18 Echocardiogram  Aortic Valve  Calcified aortic valve leaflets. No aortic stenosis. Moderate aortic Insufficiency.  Normal left ventricular chamber size. Normal left ventricular wall   thickness. Normal left ventricular systolic function. Left ventricular ejection fraction is visually estimated to be 70%. Normal regional wall motion. Normal diastolic function.

## 2018-12-06 NOTE — PROGRESS NOTES
Patient Roxana Cuba discharged on 11/28/18. Per inpatient visit, patient was has declined IHD services, and IHD will not be following this patient. She currently has upcoming appts scheduled with PCP Dr. Phillip 12/4, Cardiologist Dr. Phan 1/7, Pulmonary Dr. Kaitlin Reddy 3/14.

## 2018-12-13 DIAGNOSIS — K59.03 DRUG-INDUCED CONSTIPATION: ICD-10-CM

## 2018-12-13 RX ORDER — LACTULOSE 10 G/15ML
SOLUTION ORAL
Qty: 1800 ML | Refills: 3 | Status: SHIPPED | OUTPATIENT
Start: 2018-12-13 | End: 2019-01-30 | Stop reason: SDUPTHER

## 2018-12-17 ENCOUNTER — PATIENT OUTREACH (OUTPATIENT)
Dept: HEALTH INFORMATION MANAGEMENT | Facility: OTHER | Age: 58
End: 2018-12-17

## 2018-12-17 NOTE — PROGRESS NOTES
Outcome: Left Message    Please transfer to Patient Outreach Team at [phone] when patient returns call.    WebIZ Checked & Epic Updated:  yes  MMR   Td (adult), adsorbed   Hep B, adult     [facility] Verified: yes    Attempt # 1

## 2018-12-19 NOTE — TELEPHONE ENCOUNTER
Spoke with patient & [employer]:    Per [employer] - the pharmacy is billing for too high of a quantity, stating that they're billing for 84 vials/mo.     Per Patient - patient states she does not receive this medication from any pharmacy, but rather from the  themselves, she receives the product directly from [employer] through their patient assistance program and thinks that there may have been a mistake of this medication some how being sent to [facility] by accident.     I have updated the prior auth to include the correct amount for billing purposes and left voicemails with [employer] to contact our office regarding the matter.     Patient requested to just hold off on moving forward with any of the situation because she's gotten three automated voicemails - two stating declined, one stating approved but none are from [employer], which is the company she gets her medication from.     Patient states she'll call back if there is an issue getting her order from [employer]

## 2018-12-22 ENCOUNTER — HOSPITAL ENCOUNTER (OUTPATIENT)
Dept: LAB | Facility: MEDICAL CENTER | Age: 58
End: 2018-12-22
Attending: INTERNAL MEDICINE
Payer: MEDICARE

## 2018-12-22 ENCOUNTER — HOSPITAL ENCOUNTER (OUTPATIENT)
Dept: LAB | Facility: MEDICAL CENTER | Age: 58
End: 2018-12-22
Attending: FAMILY MEDICINE
Payer: MEDICARE

## 2018-12-22 DIAGNOSIS — R73.9 STEROID-INDUCED HYPERGLYCEMIA: ICD-10-CM

## 2018-12-22 DIAGNOSIS — T38.0X5A STEROID-INDUCED HYPERGLYCEMIA: ICD-10-CM

## 2018-12-22 LAB
25(OH)D3 SERPL-MCNC: 35 NG/ML (ref 30–100)
ALBUMIN SERPL BCP-MCNC: 4.3 G/DL (ref 3.2–4.9)
ALBUMIN SERPL BCP-MCNC: 4.5 G/DL (ref 3.2–4.9)
ALBUMIN/GLOB SERPL: 1.9 G/DL
ALBUMIN/GLOB SERPL: 2 G/DL
ALP SERPL-CCNC: 33 U/L (ref 30–99)
ALP SERPL-CCNC: 35 U/L (ref 30–99)
ALT SERPL-CCNC: 29 U/L (ref 2–50)
ALT SERPL-CCNC: 30 U/L (ref 2–50)
ANION GAP SERPL CALC-SCNC: 8 MMOL/L (ref 0–11.9)
ANION GAP SERPL CALC-SCNC: 8 MMOL/L (ref 0–11.9)
APPEARANCE UR: CLEAR
AST SERPL-CCNC: 32 U/L (ref 12–45)
AST SERPL-CCNC: 33 U/L (ref 12–45)
BASOPHILS # BLD AUTO: 0.6 % (ref 0–1.8)
BASOPHILS # BLD: 0.02 K/UL (ref 0–0.12)
BILIRUB SERPL-MCNC: 0.5 MG/DL (ref 0.1–1.5)
BILIRUB SERPL-MCNC: 0.5 MG/DL (ref 0.1–1.5)
BILIRUB UR QL STRIP.AUTO: NEGATIVE
BUN SERPL-MCNC: 19 MG/DL (ref 8–22)
BUN SERPL-MCNC: 21 MG/DL (ref 8–22)
CALCIUM SERPL-MCNC: 9.4 MG/DL (ref 8.5–10.5)
CALCIUM SERPL-MCNC: 9.6 MG/DL (ref 8.5–10.5)
CHLORIDE SERPL-SCNC: 106 MMOL/L (ref 96–112)
CHLORIDE SERPL-SCNC: 107 MMOL/L (ref 96–112)
CO2 SERPL-SCNC: 27 MMOL/L (ref 20–33)
CO2 SERPL-SCNC: 27 MMOL/L (ref 20–33)
COLOR UR: YELLOW
CREAT SERPL-MCNC: 0.95 MG/DL (ref 0.5–1.4)
CREAT SERPL-MCNC: 1.01 MG/DL (ref 0.5–1.4)
CREAT UR-MCNC: 129.6 MG/DL
EOSINOPHIL # BLD AUTO: 0.16 K/UL (ref 0–0.51)
EOSINOPHIL NFR BLD: 5 % (ref 0–6.9)
ERYTHROCYTE [DISTWIDTH] IN BLOOD BY AUTOMATED COUNT: 52.8 FL (ref 35.9–50)
ERYTHROCYTE [DISTWIDTH] IN BLOOD BY AUTOMATED COUNT: 53.1 FL (ref 35.9–50)
EST. AVERAGE GLUCOSE BLD GHB EST-MCNC: 103 MG/DL
FASTING STATUS PATIENT QL REPORTED: NORMAL
FERRITIN SERPL-MCNC: 45.8 NG/ML (ref 10–291)
FERRITIN SERPL-MCNC: 48.2 NG/ML (ref 10–291)
GGT SERPL-CCNC: 11 U/L (ref 7–34)
GLOBULIN SER CALC-MCNC: 2.3 G/DL (ref 1.9–3.5)
GLOBULIN SER CALC-MCNC: 2.3 G/DL (ref 1.9–3.5)
GLUCOSE SERPL-MCNC: 93 MG/DL (ref 65–99)
GLUCOSE SERPL-MCNC: 93 MG/DL (ref 65–99)
GLUCOSE UR STRIP.AUTO-MCNC: NEGATIVE MG/DL
HBA1C MFR BLD: 5.2 % (ref 0–5.6)
HCT VFR BLD AUTO: 40.9 % (ref 37–47)
HCT VFR BLD AUTO: 41.2 % (ref 37–47)
HGB BLD-MCNC: 13.4 G/DL (ref 12–16)
HGB BLD-MCNC: 13.4 G/DL (ref 12–16)
IMM GRANULOCYTES # BLD AUTO: 0 K/UL (ref 0–0.11)
IMM GRANULOCYTES NFR BLD AUTO: 0 % (ref 0–0.9)
IRON SATN MFR SERPL: 44 % (ref 15–55)
IRON SATN MFR SERPL: 47 % (ref 15–55)
IRON SERPL-MCNC: 179 UG/DL (ref 40–170)
IRON SERPL-MCNC: 179 UG/DL (ref 40–170)
KETONES UR STRIP.AUTO-MCNC: NEGATIVE MG/DL
LEUKOCYTE ESTERASE UR QL STRIP.AUTO: NEGATIVE
LYMPHOCYTES # BLD AUTO: 1.14 K/UL (ref 1–4.8)
LYMPHOCYTES NFR BLD: 35.8 % (ref 22–41)
MAGNESIUM SERPL-MCNC: 2.2 MG/DL (ref 1.5–2.5)
MAGNESIUM SERPL-MCNC: 2.2 MG/DL (ref 1.5–2.5)
MCH RBC QN AUTO: 30.6 PG (ref 27–33)
MCH RBC QN AUTO: 30.8 PG (ref 27–33)
MCHC RBC AUTO-ENTMCNC: 32.5 G/DL (ref 33.6–35)
MCHC RBC AUTO-ENTMCNC: 32.8 G/DL (ref 33.6–35)
MCV RBC AUTO: 94 FL (ref 81.4–97.8)
MCV RBC AUTO: 94.1 FL (ref 81.4–97.8)
MICRO URNS: NORMAL
MONOCYTES # BLD AUTO: 0.29 K/UL (ref 0–0.85)
MONOCYTES NFR BLD AUTO: 9.1 % (ref 0–13.4)
NEUTROPHILS # BLD AUTO: 1.57 K/UL (ref 2–7.15)
NEUTROPHILS NFR BLD: 49.5 % (ref 44–72)
NITRITE UR QL STRIP.AUTO: NEGATIVE
NRBC # BLD AUTO: 0 K/UL
NRBC BLD-RTO: 0 /100 WBC
PH UR STRIP.AUTO: 6 [PH]
PLATELET # BLD AUTO: 85 K/UL (ref 164–446)
PLATELET # BLD AUTO: 93 K/UL (ref 164–446)
PMV BLD AUTO: 9.3 FL (ref 9–12.9)
PMV BLD AUTO: 9.9 FL (ref 9–12.9)
POTASSIUM SERPL-SCNC: 3.7 MMOL/L (ref 3.6–5.5)
POTASSIUM SERPL-SCNC: 3.8 MMOL/L (ref 3.6–5.5)
PROT SERPL-MCNC: 6.6 G/DL (ref 6–8.2)
PROT SERPL-MCNC: 6.8 G/DL (ref 6–8.2)
PROT UR QL STRIP: NEGATIVE MG/DL
PROT UR-MCNC: 7.9 MG/DL (ref 0–15)
PROT/CREAT UR: 61 MG/G (ref 10–107)
PTH-INTACT SERPL-MCNC: 64.8 PG/ML (ref 14–72)
RBC # BLD AUTO: 4.35 M/UL (ref 4.2–5.4)
RBC # BLD AUTO: 4.38 M/UL (ref 4.2–5.4)
RBC UR QL AUTO: NEGATIVE
SODIUM SERPL-SCNC: 141 MMOL/L (ref 135–145)
SODIUM SERPL-SCNC: 142 MMOL/L (ref 135–145)
SP GR UR STRIP.AUTO: 1.02
TIBC SERPL-MCNC: 382 UG/DL (ref 250–450)
TIBC SERPL-MCNC: 407 UG/DL (ref 250–450)
URATE SERPL-MCNC: 5 MG/DL (ref 1.9–8.2)
UROBILINOGEN UR STRIP.AUTO-MCNC: 0.2 MG/DL
WBC # BLD AUTO: 3.1 K/UL (ref 4.8–10.8)
WBC # BLD AUTO: 3.2 K/UL (ref 4.8–10.8)

## 2018-12-22 PROCEDURE — 84165 PROTEIN E-PHORESIS SERUM: CPT

## 2018-12-22 PROCEDURE — 85025 COMPLETE CBC W/AUTO DIFF WBC: CPT

## 2018-12-22 PROCEDURE — 83735 ASSAY OF MAGNESIUM: CPT | Mod: 91

## 2018-12-22 PROCEDURE — 82570 ASSAY OF URINE CREATININE: CPT

## 2018-12-22 PROCEDURE — 84160 ASSAY OF PROTEIN ANY SOURCE: CPT

## 2018-12-22 PROCEDURE — 83550 IRON BINDING TEST: CPT | Mod: 91

## 2018-12-22 PROCEDURE — 83550 IRON BINDING TEST: CPT

## 2018-12-22 PROCEDURE — 83883 ASSAY NEPHELOMETRY NOT SPEC: CPT | Mod: 91

## 2018-12-22 PROCEDURE — 83540 ASSAY OF IRON: CPT

## 2018-12-22 PROCEDURE — 83540 ASSAY OF IRON: CPT | Mod: 91

## 2018-12-22 PROCEDURE — 83970 ASSAY OF PARATHORMONE: CPT

## 2018-12-22 PROCEDURE — 80197 ASSAY OF TACROLIMUS: CPT

## 2018-12-22 PROCEDURE — 86334 IMMUNOFIX E-PHORESIS SERUM: CPT

## 2018-12-22 PROCEDURE — 84156 ASSAY OF PROTEIN URINE: CPT

## 2018-12-22 PROCEDURE — 82977 ASSAY OF GGT: CPT

## 2018-12-22 PROCEDURE — 82306 VITAMIN D 25 HYDROXY: CPT

## 2018-12-22 PROCEDURE — 83735 ASSAY OF MAGNESIUM: CPT

## 2018-12-22 PROCEDURE — 84550 ASSAY OF BLOOD/URIC ACID: CPT

## 2018-12-22 PROCEDURE — 80053 COMPREHEN METABOLIC PANEL: CPT | Mod: 91

## 2018-12-22 PROCEDURE — 81003 URINALYSIS AUTO W/O SCOPE: CPT

## 2018-12-22 PROCEDURE — 83036 HEMOGLOBIN GLYCOSYLATED A1C: CPT

## 2018-12-22 PROCEDURE — 84100 ASSAY OF PHOSPHORUS: CPT

## 2018-12-22 PROCEDURE — 82728 ASSAY OF FERRITIN: CPT

## 2018-12-22 PROCEDURE — 82232 ASSAY OF BETA-2 PROTEIN: CPT

## 2018-12-22 PROCEDURE — 85027 COMPLETE CBC AUTOMATED: CPT

## 2018-12-22 PROCEDURE — 82728 ASSAY OF FERRITIN: CPT | Mod: 91

## 2018-12-22 PROCEDURE — 80053 COMPREHEN METABOLIC PANEL: CPT

## 2018-12-22 PROCEDURE — 36415 COLL VENOUS BLD VENIPUNCTURE: CPT

## 2018-12-22 PROCEDURE — 82784 ASSAY IGA/IGD/IGG/IGM EACH: CPT

## 2018-12-26 LAB
B2 MICROGLOB SERPL-MCNC: 3.9 MG/L (ref 1.1–2.4)
IGA SERPL-MCNC: 112 MG/DL (ref 68–408)
IGG SERPL-MCNC: 651 MG/DL (ref 768–1632)
IGM SERPL-MCNC: 45 MG/DL (ref 35–263)
KAPPA LC FREE SER-MCNC: 1.91 MG/DL (ref 0.33–1.94)
KAPPA LC FREE/LAMBDA FREE SER NEPH: 1.52 {RATIO} (ref 0.26–1.65)
LAMBDA LC FREE SERPL-MCNC: 1.26 MG/DL (ref 0.57–2.63)
TACROLIMUS BLD-MCNC: 1.7 NG/ML

## 2018-12-27 LAB
ALBUMIN SERPL-MCNC: 4.51 G/DL (ref 3.75–5.01)
ALPHA1 GLOB SERPL ELPH-MCNC: 0.32 G/DL (ref 0.19–0.46)
ALPHA2 GLOB SERPL ELPH-MCNC: 0.65 G/DL (ref 0.48–1.05)
B-GLOBULIN SERPL ELPH-MCNC: 0.72 G/DL (ref 0.48–1.1)
GAMMA GLOB SERPL ELPH-MCNC: 0.7 G/DL (ref 0.62–1.51)
INTERPRETATION SERPL IFE-IMP: NORMAL
MONOCLON BAND OBS SERPL: NORMAL
PATHOLOGY STUDY: NORMAL
PROT SERPL-MCNC: 6.9 G/DL (ref 6–8.3)

## 2019-01-01 RX ORDER — FLUTICASONE PROPIONATE 50 MCG
SPRAY, SUSPENSION (ML) NASAL
Qty: 48 G | Refills: 3 | Status: ON HOLD | OUTPATIENT
Start: 2019-01-01 | End: 2021-08-03

## 2019-01-09 RX ORDER — AMBRISENTAN 10 MG/1
10 TABLET, FILM COATED ORAL DAILY
Qty: 30 TAB | Refills: 6 | OUTPATIENT
Start: 2019-01-09

## 2019-01-09 NOTE — TELEPHONE ENCOUNTER
Patient needs the Letairis phoned into:  [facility] pharmacy at:  Phone: [phone]  Fax: [phone]

## 2019-01-14 DIAGNOSIS — I27.20 PULMONARY HYPERTENSION (HCC): ICD-10-CM

## 2019-01-14 DIAGNOSIS — J44.1 CHRONIC OBSTRUCTIVE PULMONARY DISEASE WITH ACUTE EXACERBATION (HCC): ICD-10-CM

## 2019-01-14 RX ORDER — AMBRISENTAN 10 MG/1
10 TABLET, FILM COATED ORAL DAILY
Qty: 90 TAB | Refills: 3 | Status: ON HOLD | OUTPATIENT
Start: 2019-01-14 | End: 2021-08-03

## 2019-01-15 RX ORDER — GABAPENTIN 600 MG/1
TABLET ORAL
Qty: 90 TAB | Refills: 1 | Status: SHIPPED | OUTPATIENT
Start: 2019-01-15 | End: 2019-03-11 | Stop reason: SDUPTHER

## 2019-01-15 NOTE — TELEPHONE ENCOUNTER
Was the patient seen in the last year in this department? Yes lov 12/4/18    Does patient have an active prescription for medications requested? No     Received Request Via: Pharmacy

## 2019-01-18 ENCOUNTER — HOSPITAL ENCOUNTER (OUTPATIENT)
Facility: MEDICAL CENTER | Age: 59
End: 2019-01-18
Attending: INTERNAL MEDICINE
Payer: MEDICARE

## 2019-01-18 PROCEDURE — 83883 ASSAY NEPHELOMETRY NOT SPEC: CPT | Mod: 91

## 2019-01-18 PROCEDURE — 84156 ASSAY OF PROTEIN URINE: CPT

## 2019-01-22 LAB
ALBUMIN 24H UR QL ELPH: DETECTED
ALPHA1 GLOB 24H UR QL ELPH: DETECTED
ALPHA2 GLOB 24H UR QL ELPH: DETECTED
B-GLOBULIN UR QL ELPH: DETECTED
COLLECT DURATION TIME SPEC: 24 HRS
GAMMA GLOB UR QL ELPH: DETECTED
INTERPRETATION UR IFE-IMP: ABNORMAL
KAPPA LC FREE UR-MCNC: 5.91 MG/DL (ref 0.14–2.42)
KAPPA LC FREE/LAMBDA FREE UR: 23.64 RATIO (ref 2.04–10.37)
LAMBDA LC FREE UR-MCNC: 0.25 MG/DL (ref 0.02–0.67)
PROT 24H UR-MRATE: 68 MG/D (ref 10–140)
SPECIMEN VOL ?TM UR: 1100 ML

## 2019-01-24 DIAGNOSIS — K59.03 DRUG-INDUCED CONSTIPATION: ICD-10-CM

## 2019-01-24 RX ORDER — LINACLOTIDE 290 UG/1
CAPSULE, GELATIN COATED ORAL
Qty: 90 CAP | Refills: 2 | OUTPATIENT
Start: 2019-01-24

## 2019-01-24 RX ORDER — IBUPROFEN 100 MG/5ML
10 SUSPENSION, ORAL (FINAL DOSE FORM) ORAL
Qty: 30 SUPPOSITORY | Refills: 3 | Status: SHIPPED | OUTPATIENT
Start: 2019-01-24 | End: 2019-05-02 | Stop reason: SDUPTHER

## 2019-01-24 NOTE — TELEPHONE ENCOUNTER
Please call patient as I do not believe she is still taking this because she is on the Relistor.  Elisa Phillip M.D.

## 2019-01-25 NOTE — TELEPHONE ENCOUNTER
Pt calling back stating she is not taking opioids.  She is not using Relistor. She is asking for a refill for Linzess.

## 2019-01-27 DIAGNOSIS — K59.03 DRUG-INDUCED CONSTIPATION: ICD-10-CM

## 2019-01-28 NOTE — TELEPHONE ENCOUNTER
Phone Number Called: [phone] (home)     Message: lvm requesting patient to call back to schedule an appointment    Left Message for patient to call back: yes

## 2019-01-29 ENCOUNTER — TELEPHONE (OUTPATIENT)
Dept: MEDICAL GROUP | Facility: LAB | Age: 59
End: 2019-01-29

## 2019-01-29 RX ORDER — SERTRALINE HYDROCHLORIDE 100 MG/1
100 TABLET, FILM COATED ORAL
Qty: 90 TAB | Refills: 2 | Status: SHIPPED | OUTPATIENT
Start: 2019-01-29 | End: 2019-01-30

## 2019-01-29 RX ORDER — LINACLOTIDE 290 UG/1
CAPSULE, GELATIN COATED ORAL
Refills: 2 | OUTPATIENT
Start: 2019-01-29

## 2019-01-29 RX ORDER — BLOOD-GLUCOSE METER
KIT MISCELLANEOUS
Qty: 100 STRIP | Refills: 3 | Status: SHIPPED | OUTPATIENT
Start: 2019-01-29 | End: 2019-01-30 | Stop reason: SDUPTHER

## 2019-01-29 NOTE — TELEPHONE ENCOUNTER
ESTABLISHED PATIENT PRE-VISIT PLANNING     Patient was NOT contacted to complete PVP.     Note: Patient will not be contacted if there is no indication to call.     1.  Reviewed notes from the last few office visits within the medical group: Yes    2.  If any orders were placed at last visit or intended to be done for this visit (i.e. 6 mos follow-up), do we have Results/Consult Notes?        •  Labs - Labs ordered, completed on 12/22/18 and results are in chart..       •  Imaging - Imaging was not ordered at last office visit.       •  Referrals - No referrals were ordered at last office visit.    3. Is this appointment scheduled as a Hospital Follow-Up? No    4.  Immunizations were updated in Epic using WebIZ?: Epic matches WebIZ       •  Web Iz Recommendations: HEPATITIS B, MMR  and SHINGRIX (Shingles)    5.  Patient is due for the following Health Maintenance Topics:   Health Maintenance Due   Topic Date Due   • Annual Wellness Visit  1960   • PFT SCREENING-FEV1 AND FEV/FVC RATIO / SPIROMETRY SHOULD BE PERFORMED ANNUALLY  08/11/2017   • URINE DRUG SCREEN  04/07/2018   • IMM HEP B VACCINE (3 of 3 - Risk 3-dose series) 07/25/2018   • IMM ZOSTER VACCINES (2 of 2) 01/29/2019       - Patient has completed FLU, HEPATITIS A , HEPATITIS B, PNEUMOVAX (PPSV23), PREVNAR (PCV13)  and TDAP Immunization(s) per WebIZ. Chart has been updated.    6. Orders for overdue Health Maintenance topics pended in Pre-Charting? YES    7.  AHA (MDX) form printed for Provider? YES    8.  Patient was NOT informed to arrive 15 min prior to their scheduled appointment and bring in their medication bottles.

## 2019-01-30 ENCOUNTER — HOSPITAL ENCOUNTER (OUTPATIENT)
Dept: LAB | Facility: MEDICAL CENTER | Age: 59
End: 2019-01-30
Attending: INTERNAL MEDICINE
Payer: MEDICARE

## 2019-01-30 ENCOUNTER — OFFICE VISIT (OUTPATIENT)
Dept: MEDICAL GROUP | Facility: LAB | Age: 59
End: 2019-01-30
Payer: MEDICARE

## 2019-01-30 ENCOUNTER — HOSPITAL ENCOUNTER (OUTPATIENT)
Dept: LAB | Facility: MEDICAL CENTER | Age: 59
End: 2019-01-30
Attending: FAMILY MEDICINE
Payer: MEDICARE

## 2019-01-30 ENCOUNTER — HOSPITAL ENCOUNTER (OUTPATIENT)
Dept: LAB | Facility: MEDICAL CENTER | Age: 59
End: 2019-01-30
Attending: PHYSICIAN ASSISTANT
Payer: MEDICARE

## 2019-01-30 ENCOUNTER — PATIENT MESSAGE (OUTPATIENT)
Dept: MEDICAL GROUP | Facility: LAB | Age: 59
End: 2019-01-30

## 2019-01-30 VITALS
TEMPERATURE: 97.8 F | DIASTOLIC BLOOD PRESSURE: 62 MMHG | BODY MASS INDEX: 20 KG/M2 | HEIGHT: 68 IN | HEART RATE: 72 BPM | OXYGEN SATURATION: 99 % | WEIGHT: 132 LBS | RESPIRATION RATE: 20 BRPM | SYSTOLIC BLOOD PRESSURE: 104 MMHG

## 2019-01-30 DIAGNOSIS — D47.2 MGUS (MONOCLONAL GAMMOPATHY OF UNKNOWN SIGNIFICANCE): ICD-10-CM

## 2019-01-30 DIAGNOSIS — R20.0 NUMBNESS AND TINGLING OF LEFT THUMB: ICD-10-CM

## 2019-01-30 DIAGNOSIS — K59.03 DRUG-INDUCED CONSTIPATION: ICD-10-CM

## 2019-01-30 DIAGNOSIS — I27.20 PULMONARY HYPERTENSION (HCC): ICD-10-CM

## 2019-01-30 DIAGNOSIS — I35.1 MILD AORTIC REGURGITATION: ICD-10-CM

## 2019-01-30 DIAGNOSIS — R20.2 NUMBNESS AND TINGLING OF LEFT THUMB: ICD-10-CM

## 2019-01-30 DIAGNOSIS — E03.9 ACQUIRED HYPOTHYROIDISM: ICD-10-CM

## 2019-01-30 DIAGNOSIS — D84.9 IMMUNOCOMPROMISED STATE (HCC): ICD-10-CM

## 2019-01-30 DIAGNOSIS — I27.0 PRIMARY PULMONARY HYPERTENSION (HCC): ICD-10-CM

## 2019-01-30 DIAGNOSIS — J96.11 CHRONIC RESPIRATORY FAILURE WITH HYPOXIA (HCC): ICD-10-CM

## 2019-01-30 DIAGNOSIS — Z86.19 HISTORY OF INFECTION WITH VANCOMYCIN RESISTANT ENTEROCOCCUS (VRE): ICD-10-CM

## 2019-01-30 DIAGNOSIS — Q21.11 OSTIUM SECUNDUM TYPE ATRIAL SEPTAL DEFECT: ICD-10-CM

## 2019-01-30 DIAGNOSIS — F33.41 RECURRENT MAJOR DEPRESSIVE DISORDER, IN PARTIAL REMISSION (HCC): ICD-10-CM

## 2019-01-30 DIAGNOSIS — R16.1 SPLENOMEGALY: ICD-10-CM

## 2019-01-30 DIAGNOSIS — Z79.52 ON PREDNISONE THERAPY: ICD-10-CM

## 2019-01-30 DIAGNOSIS — J84.9 INTERSTITIAL LUNG DISEASE (HCC): ICD-10-CM

## 2019-01-30 DIAGNOSIS — R73.9 STEROID-INDUCED HYPERGLYCEMIA: ICD-10-CM

## 2019-01-30 DIAGNOSIS — K43.2 INCISIONAL HERNIA, WITHOUT OBSTRUCTION OR GANGRENE: ICD-10-CM

## 2019-01-30 DIAGNOSIS — N18.30 CKD (CHRONIC KIDNEY DISEASE) STAGE 3, GFR 30-59 ML/MIN (HCC): ICD-10-CM

## 2019-01-30 DIAGNOSIS — Z98.890 S/P PANNICULECTOMY: ICD-10-CM

## 2019-01-30 DIAGNOSIS — Z79.899 CHRONIC PRESCRIPTION BENZODIAZEPINE USE: ICD-10-CM

## 2019-01-30 DIAGNOSIS — I34.0 MILD MITRAL REGURGITATION: ICD-10-CM

## 2019-01-30 DIAGNOSIS — F41.1 GAD (GENERALIZED ANXIETY DISORDER): ICD-10-CM

## 2019-01-30 DIAGNOSIS — K76.81 HEPATOPULMONARY SYNDROME (HCC): ICD-10-CM

## 2019-01-30 DIAGNOSIS — T38.0X5A STEROID-INDUCED HYPERGLYCEMIA: ICD-10-CM

## 2019-01-30 DIAGNOSIS — E27.1 ADDISON'S DISEASE (HCC): ICD-10-CM

## 2019-01-30 DIAGNOSIS — Z94.4 STATUS POST LIVER TRANSPLANT (HCC): ICD-10-CM

## 2019-01-30 DIAGNOSIS — D86.9 SARCOIDOSIS: ICD-10-CM

## 2019-01-30 DIAGNOSIS — F42.2 MIXED OBSESSIONAL THOUGHTS AND ACTS: ICD-10-CM

## 2019-01-30 DIAGNOSIS — D69.6 THROMBOCYTOPENIA (HCC): ICD-10-CM

## 2019-01-30 DIAGNOSIS — K59.01 SLOW TRANSIT CONSTIPATION: ICD-10-CM

## 2019-01-30 DIAGNOSIS — F51.01 PRIMARY INSOMNIA: ICD-10-CM

## 2019-01-30 DIAGNOSIS — E55.9 VITAMIN D DEFICIENCY: ICD-10-CM

## 2019-01-30 DIAGNOSIS — R73.9 HYPERGLYCEMIA: ICD-10-CM

## 2019-01-30 DIAGNOSIS — K21.00 GASTROESOPHAGEAL REFLUX DISEASE WITH ESOPHAGITIS: ICD-10-CM

## 2019-01-30 DIAGNOSIS — Z23 NEED FOR VACCINATION: ICD-10-CM

## 2019-01-30 PROBLEM — I95.1 ORTHOSTATIC HYPOTENSION: Status: RESOLVED | Noted: 2017-10-27 | Resolved: 2019-01-30

## 2019-01-30 PROBLEM — Z16.21 VRE (VANCOMYCIN-RESISTANT ENTEROCOCCI): Status: RESOLVED | Noted: 2017-07-14 | Resolved: 2019-01-30

## 2019-01-30 PROBLEM — F32.A DEPRESSION: Status: RESOLVED | Noted: 2017-07-14 | Resolved: 2019-01-30

## 2019-01-30 PROBLEM — A49.1 VRE (VANCOMYCIN-RESISTANT ENTEROCOCCI): Status: RESOLVED | Noted: 2017-07-14 | Resolved: 2019-01-30

## 2019-01-30 PROBLEM — Z79.891 CHRONIC USE OF OPIATE DRUGS THERAPEUTIC PURPOSES: Status: RESOLVED | Noted: 2017-04-12 | Resolved: 2019-01-30

## 2019-01-30 PROBLEM — G89.29 CHRONIC PAIN: Status: RESOLVED | Noted: 2018-01-23 | Resolved: 2019-01-30

## 2019-01-30 PROBLEM — M95.0 NASAL VALVE COLLAPSE: Status: RESOLVED | Noted: 2018-10-04 | Resolved: 2019-01-30

## 2019-01-30 PROBLEM — B02.9 HERPES ZOSTER WITHOUT COMPLICATION: Status: RESOLVED | Noted: 2017-07-14 | Resolved: 2019-01-30

## 2019-01-30 PROBLEM — F11.20 OPIOID DEPENDENCE (HCC): Status: RESOLVED | Noted: 2017-03-22 | Resolved: 2019-01-30

## 2019-01-30 PROBLEM — E61.1 IRON DEFICIENCY: Status: RESOLVED | Noted: 2017-01-20 | Resolved: 2019-01-30

## 2019-01-30 PROBLEM — Z87.19 HISTORY OF SMALL BOWEL OBSTRUCTION: Status: ACTIVE | Noted: 2017-07-14

## 2019-01-30 LAB
25(OH)D3 SERPL-MCNC: 50 NG/ML (ref 30–100)
ALBUMIN SERPL BCP-MCNC: 4.7 G/DL (ref 3.2–4.9)
ALBUMIN SERPL BCP-MCNC: 4.8 G/DL (ref 3.2–4.9)
ALBUMIN/GLOB SERPL: 2.7 G/DL
ALP SERPL-CCNC: 29 U/L (ref 30–99)
ALP SERPL-CCNC: 30 U/L (ref 30–99)
ALT SERPL-CCNC: 27 U/L (ref 2–50)
ALT SERPL-CCNC: 28 U/L (ref 2–50)
ANION GAP SERPL CALC-SCNC: 6 MMOL/L (ref 0–11.9)
AST SERPL-CCNC: 25 U/L (ref 12–45)
AST SERPL-CCNC: 26 U/L (ref 12–45)
BASOPHILS # BLD AUTO: 0.9 % (ref 0–1.8)
BASOPHILS # BLD AUTO: 1.2 % (ref 0–1.8)
BASOPHILS # BLD: 0.03 K/UL (ref 0–0.12)
BASOPHILS # BLD: 0.04 K/UL (ref 0–0.12)
BILIRUB CONJ SERPL-MCNC: 0.1 MG/DL (ref 0.1–0.5)
BILIRUB INDIRECT SERPL-MCNC: 0.4 MG/DL (ref 0–1)
BILIRUB SERPL-MCNC: 0.5 MG/DL (ref 0.1–1.5)
BILIRUB SERPL-MCNC: 0.5 MG/DL (ref 0.1–1.5)
BUN SERPL-MCNC: 21 MG/DL (ref 8–22)
BUN SERPL-MCNC: 21 MG/DL (ref 8–22)
CALCIUM SERPL-MCNC: 10.2 MG/DL (ref 8.5–10.5)
CALCIUM SERPL-MCNC: 10.4 MG/DL (ref 8.5–10.5)
CHLORIDE SERPL-SCNC: 107 MMOL/L (ref 96–112)
CHLORIDE SERPL-SCNC: 108 MMOL/L (ref 96–112)
CHOLEST SERPL-MCNC: 160 MG/DL (ref 100–199)
CO2 SERPL-SCNC: 27 MMOL/L (ref 20–33)
CO2 SERPL-SCNC: 27 MMOL/L (ref 20–33)
CREAT SERPL-MCNC: 1.19 MG/DL (ref 0.5–1.4)
CREAT SERPL-MCNC: 1.25 MG/DL (ref 0.5–1.4)
EOSINOPHIL # BLD AUTO: 0.08 K/UL (ref 0–0.51)
EOSINOPHIL # BLD AUTO: 0.09 K/UL (ref 0–0.51)
EOSINOPHIL NFR BLD: 2.4 % (ref 0–6.9)
EOSINOPHIL NFR BLD: 2.6 % (ref 0–6.9)
ERYTHROCYTE [DISTWIDTH] IN BLOOD BY AUTOMATED COUNT: 45.7 FL (ref 35.9–50)
ERYTHROCYTE [DISTWIDTH] IN BLOOD BY AUTOMATED COUNT: 46.5 FL (ref 35.9–50)
EST. AVERAGE GLUCOSE BLD GHB EST-MCNC: 108 MG/DL
FERRITIN SERPL-MCNC: 103 NG/ML (ref 10–291)
GGT SERPL-CCNC: 17 U/L (ref 7–34)
GLOBULIN SER CALC-MCNC: 1.8 G/DL (ref 1.9–3.5)
GLUCOSE SERPL-MCNC: 126 MG/DL (ref 65–99)
GLUCOSE SERPL-MCNC: 127 MG/DL (ref 65–99)
HBA1C MFR BLD: 5.4 % (ref 0–5.6)
HCT VFR BLD AUTO: 39.8 % (ref 37–47)
HCT VFR BLD AUTO: 40.5 % (ref 37–47)
HDLC SERPL-MCNC: 72 MG/DL
HGB BLD-MCNC: 13.4 G/DL (ref 12–16)
HGB BLD-MCNC: 13.4 G/DL (ref 12–16)
IMM GRANULOCYTES # BLD AUTO: 0.01 K/UL (ref 0–0.11)
IMM GRANULOCYTES # BLD AUTO: 0.01 K/UL (ref 0–0.11)
IMM GRANULOCYTES NFR BLD AUTO: 0.3 % (ref 0–0.9)
IMM GRANULOCYTES NFR BLD AUTO: 0.3 % (ref 0–0.9)
IRON SATN MFR SERPL: 34 % (ref 15–55)
IRON SERPL-MCNC: 124 UG/DL (ref 40–170)
LDLC SERPL CALC-MCNC: 67 MG/DL
LYMPHOCYTES # BLD AUTO: 0.83 K/UL (ref 1–4.8)
LYMPHOCYTES # BLD AUTO: 0.88 K/UL (ref 1–4.8)
LYMPHOCYTES NFR BLD: 24.8 % (ref 22–41)
LYMPHOCYTES NFR BLD: 25.5 % (ref 22–41)
MAGNESIUM SERPL-MCNC: 2.3 MG/DL (ref 1.5–2.5)
MAGNESIUM SERPL-MCNC: 2.4 MG/DL (ref 1.5–2.5)
MCH RBC QN AUTO: 31.2 PG (ref 27–33)
MCH RBC QN AUTO: 31.5 PG (ref 27–33)
MCHC RBC AUTO-ENTMCNC: 33.1 G/DL (ref 33.6–35)
MCHC RBC AUTO-ENTMCNC: 33.7 G/DL (ref 33.6–35)
MCV RBC AUTO: 93.4 FL (ref 81.4–97.8)
MCV RBC AUTO: 94.2 FL (ref 81.4–97.8)
MONOCYTES # BLD AUTO: 0.26 K/UL (ref 0–0.85)
MONOCYTES # BLD AUTO: 0.26 K/UL (ref 0–0.85)
MONOCYTES NFR BLD AUTO: 7.5 % (ref 0–13.4)
MONOCYTES NFR BLD AUTO: 7.8 % (ref 0–13.4)
NEUTROPHILS # BLD AUTO: 2.14 K/UL (ref 2–7.15)
NEUTROPHILS # BLD AUTO: 2.17 K/UL (ref 2–7.15)
NEUTROPHILS NFR BLD: 62.9 % (ref 44–72)
NEUTROPHILS NFR BLD: 63.8 % (ref 44–72)
NRBC # BLD AUTO: 0 K/UL
NRBC # BLD AUTO: 0 K/UL
NRBC BLD-RTO: 0 /100 WBC
NRBC BLD-RTO: 0 /100 WBC
PHOSPHATE SERPL-MCNC: 4.3 MG/DL (ref 2.5–4.5)
PLATELET # BLD AUTO: 94 K/UL (ref 164–446)
PLATELET # BLD AUTO: 97 K/UL (ref 164–446)
PMV BLD AUTO: 9.1 FL (ref 9–12.9)
PMV BLD AUTO: 9.5 FL (ref 9–12.9)
POTASSIUM SERPL-SCNC: 4.1 MMOL/L (ref 3.6–5.5)
POTASSIUM SERPL-SCNC: 4.1 MMOL/L (ref 3.6–5.5)
PROT SERPL-MCNC: 6.6 G/DL (ref 6–8.2)
PROT SERPL-MCNC: 6.7 G/DL (ref 6–8.2)
RBC # BLD AUTO: 4.26 M/UL (ref 4.2–5.4)
RBC # BLD AUTO: 4.3 M/UL (ref 4.2–5.4)
SODIUM SERPL-SCNC: 139 MMOL/L (ref 135–145)
SODIUM SERPL-SCNC: 141 MMOL/L (ref 135–145)
T4 FREE SERPL-MCNC: 0.98 NG/DL (ref 0.53–1.43)
TIBC SERPL-MCNC: 365 UG/DL (ref 250–450)
TRIGL SERPL-MCNC: 103 MG/DL (ref 0–149)
TSH SERPL DL<=0.005 MIU/L-ACNC: 0.1 UIU/ML (ref 0.38–5.33)
WBC # BLD AUTO: 3.4 K/UL (ref 4.8–10.8)
WBC # BLD AUTO: 3.5 K/UL (ref 4.8–10.8)

## 2019-01-30 PROCEDURE — 8041 PR SCP AHA: Performed by: FAMILY MEDICINE

## 2019-01-30 PROCEDURE — 82728 ASSAY OF FERRITIN: CPT

## 2019-01-30 PROCEDURE — 84520 ASSAY OF UREA NITROGEN: CPT

## 2019-01-30 PROCEDURE — 84460 ALANINE AMINO (ALT) (SGPT): CPT

## 2019-01-30 PROCEDURE — 84439 ASSAY OF FREE THYROXINE: CPT

## 2019-01-30 PROCEDURE — 82374 ASSAY BLOOD CARBON DIOXIDE: CPT

## 2019-01-30 PROCEDURE — 84132 ASSAY OF SERUM POTASSIUM: CPT

## 2019-01-30 PROCEDURE — 82435 ASSAY OF BLOOD CHLORIDE: CPT

## 2019-01-30 PROCEDURE — 84450 TRANSFERASE (AST) (SGOT): CPT

## 2019-01-30 PROCEDURE — 82947 ASSAY GLUCOSE BLOOD QUANT: CPT

## 2019-01-30 PROCEDURE — 99215 OFFICE O/P EST HI 40 MIN: CPT | Mod: 25 | Performed by: FAMILY MEDICINE

## 2019-01-30 PROCEDURE — 83735 ASSAY OF MAGNESIUM: CPT | Mod: 91

## 2019-01-30 PROCEDURE — 84100 ASSAY OF PHOSPHORUS: CPT

## 2019-01-30 PROCEDURE — 80061 LIPID PANEL: CPT

## 2019-01-30 PROCEDURE — 82746 ASSAY OF FOLIC ACID SERUM: CPT

## 2019-01-30 PROCEDURE — 85025 COMPLETE CBC W/AUTO DIFF WBC: CPT

## 2019-01-30 PROCEDURE — 82040 ASSAY OF SERUM ALBUMIN: CPT

## 2019-01-30 PROCEDURE — 82977 ASSAY OF GGT: CPT

## 2019-01-30 PROCEDURE — 84155 ASSAY OF PROTEIN SERUM: CPT

## 2019-01-30 PROCEDURE — 36415 COLL VENOUS BLD VENIPUNCTURE: CPT

## 2019-01-30 PROCEDURE — 80053 COMPREHEN METABOLIC PANEL: CPT

## 2019-01-30 PROCEDURE — 83550 IRON BINDING TEST: CPT

## 2019-01-30 PROCEDURE — 90746 HEPB VACCINE 3 DOSE ADULT IM: CPT | Performed by: FAMILY MEDICINE

## 2019-01-30 PROCEDURE — 82247 BILIRUBIN TOTAL: CPT

## 2019-01-30 PROCEDURE — 84295 ASSAY OF SERUM SODIUM: CPT

## 2019-01-30 PROCEDURE — 84480 ASSAY TRIIODOTHYRONINE (T3): CPT

## 2019-01-30 PROCEDURE — 82525 ASSAY OF COPPER: CPT

## 2019-01-30 PROCEDURE — 82565 ASSAY OF CREATININE: CPT

## 2019-01-30 PROCEDURE — 84443 ASSAY THYROID STIM HORMONE: CPT

## 2019-01-30 PROCEDURE — G0010 ADMIN HEPATITIS B VACCINE: HCPCS | Performed by: FAMILY MEDICINE

## 2019-01-30 PROCEDURE — 83735 ASSAY OF MAGNESIUM: CPT

## 2019-01-30 PROCEDURE — 82306 VITAMIN D 25 HYDROXY: CPT

## 2019-01-30 PROCEDURE — 84075 ASSAY ALKALINE PHOSPHATASE: CPT

## 2019-01-30 PROCEDURE — 80197 ASSAY OF TACROLIMUS: CPT

## 2019-01-30 PROCEDURE — 82310 ASSAY OF CALCIUM: CPT

## 2019-01-30 PROCEDURE — 85025 COMPLETE CBC W/AUTO DIFF WBC: CPT | Mod: 91

## 2019-01-30 PROCEDURE — 83540 ASSAY OF IRON: CPT

## 2019-01-30 PROCEDURE — 82607 VITAMIN B-12: CPT

## 2019-01-30 PROCEDURE — 84425 ASSAY OF VITAMIN B-1: CPT

## 2019-01-30 PROCEDURE — 83036 HEMOGLOBIN GLYCOSYLATED A1C: CPT

## 2019-01-30 PROCEDURE — 82248 BILIRUBIN DIRECT: CPT

## 2019-01-30 RX ORDER — LACTULOSE 10 G/15ML
SOLUTION ORAL
Qty: 1800 ML | Refills: 3 | Status: SHIPPED | OUTPATIENT
Start: 2019-01-30 | End: 2019-06-21 | Stop reason: SDUPTHER

## 2019-01-30 RX ORDER — OMEPRAZOLE 40 MG/1
40 CAPSULE, DELAYED RELEASE ORAL DAILY
Qty: 90 CAP | Refills: 3 | Status: ON HOLD | OUTPATIENT
Start: 2019-01-30 | End: 2021-08-03

## 2019-01-30 RX ORDER — ALPRAZOLAM 1 MG/1
1 TABLET ORAL 3 TIMES DAILY PRN
Qty: 90 TAB | Refills: 2 | Status: SHIPPED | OUTPATIENT
Start: 2019-03-04 | End: 2019-03-04

## 2019-01-30 RX ORDER — BLOOD-GLUCOSE METER
KIT MISCELLANEOUS
Qty: 100 STRIP | Refills: 3 | Status: SHIPPED | OUTPATIENT
Start: 2019-01-30 | End: 2019-03-04

## 2019-01-30 RX ORDER — TRAZODONE HYDROCHLORIDE 50 MG/1
100 TABLET ORAL
Qty: 180 TAB | Refills: 3 | Status: ON HOLD | OUTPATIENT
Start: 2019-01-30 | End: 2021-08-03

## 2019-01-30 RX ORDER — SERTRALINE HYDROCHLORIDE 100 MG/1
100 TABLET, FILM COATED ORAL
Qty: 90 TAB | Refills: 3 | Status: SHIPPED | OUTPATIENT
Start: 2019-01-30 | End: 2019-05-02 | Stop reason: SDUPTHER

## 2019-01-30 NOTE — ASSESSMENT & PLAN NOTE
Patient got off opiates on January 2 so dropped the Relistar.  She was on Linzess for years even when she was on the Relistar.  She needs a new prescription of the Linzess.  She has daily bowel movements.

## 2019-01-30 NOTE — PROGRESS NOTES
Annual Health Assessment Questions:    1.  Are you currently engaging in any exercise or physical activity? No    2.  How would you describe your mood or emotional well-being today? fair    3.  Have you had any falls in the last year? No    4.  Have you noticed any problems with your balance or had difficulty walking? No    5.  In the last six months have you experienced any leakage of urine? No    6. DPA/Advanced Directive: Patient has Living Will on file.

## 2019-01-30 NOTE — PROGRESS NOTES
Annual Health Assessment Questions:    1.  Are you currently engaging in any exercise or physical activity? Yes    2.  How would you describe your mood or emotional well-being today? anxious    3.  Have you had any falls in the last year? No    4.  Have you noticed any problems with your balance or had difficulty walking? No    5.  In the last six months have you experienced any leakage of urine? No    6. DPA/Advanced Directive: Patient has Advanced Directive on file.

## 2019-01-31 LAB
FOLATE SERPL-MCNC: >23.6 NG/ML
T3 SERPL-MCNC: 73.8 NG/DL (ref 60–181)
VIT B12 SERPL-MCNC: >1500 PG/ML (ref 211–911)

## 2019-02-01 LAB
COPPER SERPL-MCNC: 100 UG/DL (ref 80–155)
TACROLIMUS BLD-MCNC: 2.4 NG/ML

## 2019-02-04 LAB — VIT B1 BLD-MCNC: 173 NMOL/L (ref 70–180)

## 2019-02-05 NOTE — ASSESSMENT & PLAN NOTE
Patient has severe generalized anxiety.  She is currently taking alprazolam and sertraline.  She reports her anxiety has worsened as she got off the narcotics as well as her OCD behaviors.

## 2019-02-05 NOTE — ASSESSMENT & PLAN NOTE
Patient is being followed by hematology for this.  She did have some abnormal uptake on a PET scan in 2013.

## 2019-02-05 NOTE — ASSESSMENT & PLAN NOTE
Patient's diabetes has resolved with her weight loss.  She has hyperglycemia when her steroid dose is increased but otherwise has normal glucose control.

## 2019-02-05 NOTE — ASSESSMENT & PLAN NOTE
Patient has chronic respiratory failure secondary to her interstitial lung disease and sarcoidosis.

## 2019-02-05 NOTE — ASSESSMENT & PLAN NOTE
Stable. Currently taking levothyroxine 137 mcg daily as prescribed.  Denies palpitations, skin changes, temperature intolerance, or changes in bowel habits

## 2019-02-05 NOTE — ASSESSMENT & PLAN NOTE
Patient complains of some numbness and tingling in her left thumb.  She reports that this started after a failed IV attempt although it was a different area.  She denies any other trauma to the area.

## 2019-02-05 NOTE — ASSESSMENT & PLAN NOTE
Patient has chronic thrombocytopenia.  She denies any increase signs of bleeding.  This is chronic and has been followed by hematology

## 2019-02-05 NOTE — PROGRESS NOTES
Subjective:     Roxana Cuba is a 58 y.o. female here today for for follow-up of her multiple complex issues and Annual Health Assessment.    Slow transit constipation  Patient got off opiates on January 2 so dropped the Relistar.  She was on Linzess for years even when she was on the Relistar.  She needs a new prescription of the Linzess.  She has daily bowel movements.    Acquired hypothyroidism  Stable. Currently taking levothyroxine 137 mcg daily as prescribed.  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    Chronic respiratory failure with hypoxia ([facility])  Patient has chronic respiratory failure secondary to her interstitial lung disease and sarcoidosis.    CKD (chronic kidney disease) stage 3, GFR 30-59 ml/min- unclear cause, probably multifactorial  CKD: chronic condition which is stable. Currently avoids high dose NSAIDs. Denies nausea/vomiting, headache, loss of energy/unusual somnolence, mouth sores, skin discoloration or itching, muscle cramps. .       DUC (generalized anxiety disorder)  Patient has severe generalized anxiety.  She is currently taking alprazolam and sertraline.  She reports her anxiety has worsened as she got off the narcotics as well as her OCD behaviors.    GERD (gastroesophageal reflux disease)  Stable.Takes med daily as directed. Denies side effects.  Denies early satiety, unintentional weight loss, choking, persistent burning pain in chest or upper abdomen.      Immunocompromised state ([facility])  Patient is status post a liver transplant for cirrhosis and is on antirejection medications.    Incisional hernia, without obstruction or gangrene  Patient reports a bulging area on the right side of her incision from her panniculectomy.  She reports it is reducible and is not painful            Interstitial lung disease ([facility])  Patient has  interstitial lung disease as well as a hepatopulmonary shunt that results in hypoxia.  She is on chronic high flow oxygen.  Pulmonology does follow.    MGUS (monoclonal gammopathy of unknown significance)  Patient is being followed by hematology for this.  She did have some abnormal uptake on a PET scan in 2013.    Numbness and tingling of left thumb  Patient complains of some numbness and tingling in her left thumb.  She reports that this started after a failed IV attempt although it was a different area.  She denies any other trauma to the area.    Primary insomnia  Patient reports that the trazodone seems to be working well for her sleep.  She takes  mg a day.    Primary pulmonary hypertension (HCC)  Pulmonology is following    Recurrent major depressive disorder, in partial remission (HCC)  Stable. Currently taking sertraline 100 mg as prescribed.   Denies side effects and is tolerating well.  Mood is improved with current medication and therapy.    Patient denies SI/HI.  Depression Screen (PHQ-2/PHQ-9) 11/19/2018 11/23/2018 11/28/2018   PHQ-2 Total Score 0 0 0   PHQ-2 Total Score - - -   PHQ-2 Total Score - - -   PHQ-9 Total Score - - -           Steroid-induced hyperglycemia  Patient's diabetes has resolved with her weight loss.  She has hyperglycemia when her steroid dose is increased but otherwise has normal glucose control.    Thrombocytopenia (HCC)  Patient has chronic thrombocytopenia.  She denies any increase signs of bleeding.  This is chronic and has been followed by hematology       Health Maintenance Summary                Annual Wellness Visit Overdue 1960     IMM ZOSTER VACCINES Overdue 1/29/2019      Done 12/4/2018 Imm Admin: Recombinant Zoster Vaccine (RZV) ([employer])    MAMMOGRAM Next Due 3/20/2019      Done 3/20/2018 MA-MAMMO SCREENING BILAT W/TOMOSYNTHESIS W/CAD     Patient has more history with this topic...    COLONOSCOPY Next Due 3/27/2027      Done 3/27/2017 Surg:COLONOSCOPY      Patient has more history with this topic...    IMM DTaP/Tdap/Td Vaccine Next Due 4/12/2027      Done 4/12/2017 Imm Admin: Tdap Vaccine           Annual Health Assessment Questions:     1.  Are you currently engaging in any exercise or physical activity? Yes    2.  How would you describe your mood or emotional well-being today? anxious    3.  Have you had any falls in the last year? No    4.  Have you noticed any problems with your balance or had difficulty walking? No    5.  In the last six months have you experienced any leakage of urine? No    6. DPA/Advanced Directive: Patient has Advanced Directive on file.     Current medicines (including changes today)  Current Outpatient Prescriptions   Medication Sig Dispense Refill   • Linaclotide (LINZESS) 290 MCG Cap Take 1 Cap by mouth every day. 90 Cap 3   • omeprazole (PRILOSEC) 40 MG delayed-release capsule Take 1 Cap by mouth every day. 90 Cap 3   • aspirin 81 MG tablet Take 1 Tab by mouth every day. 90 Tab 3   • traZODone (DESYREL) 50 MG Tab Take 2 Tabs by mouth every bedtime. 180 Tab 3   • lactulose 10 GM/15ML Solution TAKE 30ML BY MOUTH TWICE DAILY 1800 mL 3   • sertraline (ZOLOFT) 100 MG Tab Take 1 Tab by mouth every bedtime. 90 Tab 3   • FREESTYLE LITE strip USE TO TEST EVERY DAY AS NEEDED SSX HIGH OR LOW,  Strip 3   • calcium citrate 315 mg-vitamin D 200 mg (CITRACAL+D) 315-200 MG-UNIT per tablet Take 1 Tab by mouth every day. 90 Tab 3   • [START ON 3/4/2019] ALPRAZolam (XANAX) 1 MG Tab Take 1 Tab by mouth 3 times a day as needed for Sleep for up to 30 days. 90 Tab 2   • [facility] GENTLE LAXATIVE 10 MG Suppos INSERT 1 SUPPOSITORY IN RECTUM 1 TIME DAILY AS NEEDED (CONSTIPATION). 30 Suppository 3   • gabapentin (NEURONTIN) 600 MG tablet TAKE 1 TABLET BY MOUTH THREE TIMES A DAY 90 Tab 1   • ambrisentan (LETAIRIS) 10 MG tablet Take 1 Tab by mouth every day. 90 Tab 3   • fluticasone (FLONASE) 50 MCG/ACT nasal spray SPRAY 1 SPRAY IN EACH NOSTRIL TWICE A DAY 48 g 3    • furosemide (LASIX) 40 MG Tab Take 1 Tab by mouth every morning. 90 Tab 2   • SPIRIVA HANDIHALER 18 MCG Cap INHALE 1 CAPSULE VIA HANDIHALER ONCE DAILY AT THE SAME TIME EVERY DAY 30 Cap 6   • levothyroxine (SYNTHROID) 137 MCG Tab TAKE 1 TAB BY MOUTH EVERY MORNING ON AN EMPTY STOMACH. 90 Tab 0   • Tadalafil, PAH, (ADCIRCA) 20 MG Tab Take 20 mg by mouth every bedtime. 90 Tab 3   • sennosides (SENOKOT) 8.6 MG Tab Take 17.2 mg by mouth every 12 hours.     • calcium polycarbophil (FIBERCON) 625 MG Tab Take 625 mg by mouth every morning.     • midodrine (PROAMATINE) 5 MG Tab Take 5-15 mg by mouth 1 time daily as needed.     • bisacodyl ([employer] GENTLE LAXATIVE) 10 MG Suppos Insert 20 mg in rectum every morning.     • ferrous sulfate (FEOSOL) 220 (44 Fe) MG/5ML Elixir Take 220 mg by mouth every evening.     • ondansetron (ZOFRAN ODT) 4 MG TABLET DISPERSIBLE Take 4 mg by mouth every 8 hours as needed for Nausea.     • predniSONE (DELTASONE) 5 MG Tab Take 5 mg by mouth every morning. Pt also has an RX for 2MG, pt takes total of 7MG QDAY      • tacrolimus (PROGRAF) 1 MG Cap Take 1 mg by mouth every 12 hours. Pt also has an RX for 0.5MG, pt takes total of 1.5MG BID      • Probiotic Product (PROBIOTIC DAILY PO) Take 1 Cap by mouth every evening.     • pravastatin (PRAVACHOL) 20 MG Tab Take 1 Tab by mouth every day. 90 Tab 2   • docusate sodium (COLACE) 100 MG Cap Take 100 mg by mouth See Admin Instructions. Three times a day at 0700, 1200, and 1800     • predniSONE (DELTASONE) 1 MG Tab Take 2 mg by mouth every morning. Pt also has an RX for 5MG, pt takes total of 7MG QDAY     • NON SPECIFIED Take 1 Cap by mouth every bedtime. Bariatric Dietary Supplment      • tacrolimus (PROGRAF) 0.5 MG Cap Take 0.5 mg by mouth every 12 hours. Pt also has an RX for 1MG, pt takes total of 1.5MG BID     • ascorbic acid (ASCORBIC ACID) 500 MG Tab Take 500 mg by mouth every morning.     • mycophenolate (CELLCEPT) 250 MG Cap Take 500 mg by mouth  "every 12 hours.       No current facility-administered medications for this visit.        She  has a past medical history of Anemia; Anesthesia; Breath shortness; Bronchitis ( ); Cardiomegaly; Chronic fatigue and malaise; Cirrhosis ([facility]) (December 2011); CKD (chronic kidney disease) stage 3, GFR 30-59 ml/min (HCC); Diabetes (HCC); Fracture of left foot; GERD (gastroesophageal reflux disease); H/O Clostridium difficile infection (02-17-17); Hemorrhagic disorder (HCC); Hiatus hernia syndrome; High cholesterol; Hypothyroid; Jaundice (2009); Liver transplanted ([facility]); Low back pain (02-17-17); Mild aortic regurgitation and aortic valve sclerosis ( ); On home oxygen therapy; Platelet disorder ([facility]); Pneumonia; Psychiatric disorder; Pulmonary hypertension ([facility]); S/P cholecystectomy; Small bowel obstruction ([facility]); Splenomegaly; and VRE (vancomycin-resistant Enterococci).    Rhubarb; Organic nitrates; Vancomycin hcl; Acetaminophen; Nsaids; and Other drug    She  reports that she has never smoked. She has never used smokeless tobacco. She reports that she does not drink alcohol or use drugs.  Counseling given: Not Answered      ROS   No chest pain, no abdominal pain.  No fever or chills     Objective:     Physical Exam:  Blood pressure 104/62, pulse 72, temperature 36.6 °C (97.8 °F), temperature source Temporal, resp. rate 20, height 1.727 m (5' 8\"), weight 59.9 kg (132 lb), last menstrual period 01/03/2000, SpO2 99 %, not currently breastfeeding. Body mass index is 20.07 kg/m².   Constitutional: Alert, no distress.  Skin: Warm, dry, good turgor, no rashes in visible areas.  Eye: Equal, round and reactive, conjunctiva clear, lids normal.  ENMT: Lips without lesions, good dentition, oropharynx clear.  Neck: Trachea midline, no masses, no thyromegaly. No cervical or supraclavicular lymphadenopathy.  Respiratory: Unlabored respiratory effort, lungs clear to auscultation, no wheezes, no rhonchi.  Cardiovascular: Normal S1, S2, " 2/6 systolic ejection murmur, no edema.  Abdomen: Soft, non-tender,splenomegaly present.  Small easily reducible incisional hernia on the right edge of the lower abdomen  Psych: Alert and oriented x3, normal affect and mood.  Left thumb with glove pattern of decreased sensation.  Good capillary refill.  Normal strength    Assessment and Plan:     1. Need for vaccination  Hepatitis B vaccine given  - Hepatitis B Vaccine Adult IM    2. Interstitial lung disease (HCC)  This is a chronic medical condition that is currently stable  Continue oxygen therapy and pulmonology follow-up    3. MGUS (monoclonal gammopathy of unknown significance)  This is a chronic medical condition that is currently stable  Oncology following    4. CKD (chronic kidney disease) stage 3, GFR 30-59 ml/min- unclear cause, probably multifactorial  This is a chronic medical condition that is currently stable  Avoid NSAIDs    5. Hepatopulmonary syndrome (HCC)  This is a chronic medical condition that is currently stable  Continue oxygen therapy    6. Chronic respiratory failure with hypoxia (HCC)  This is a chronic medical condition that is currently stable  Continue oxygen therapy    7. Immunocompromised state (HCC)  Patient is status post liver transplant and on immunosuppressive medications.  Continue to monitor for infections    8. DUC (generalized anxiety disorder)  Continue sertraline 100 mg daily refill alprazolam 1 mg 3 times a day.  [facility] reviewed.  Trolled substance agreement on chart.  Follow-up in 3 months  - sertraline (ZOLOFT) 100 MG Tab; Take 1 Tab by mouth every bedtime.  Dispense: 90 Tab; Refill: 3  - ALPRAZolam (XANAX) 1 MG Tab; Take 1 Tab by mouth 3 times a day as needed for Sleep for up to 30 days.  Dispense: 90 Tab; Refill: 2    9. Mixed obsessional thoughts and acts  Continue sertraline 100 mg daily refill alprazolam 1 mg 3 times a day.  [facility] reviewed.  Trolled substance agreement on chart.  Follow-up in 3 months  -  sertraline (ZOLOFT) 100 MG Tab; Take 1 Tab by mouth every bedtime.  Dispense: 90 Tab; Refill: 3  - ALPRAZolam (XANAX) 1 MG Tab; Take 1 Tab by mouth 3 times a day as needed for Sleep for up to 30 days.  Dispense: 90 Tab; Refill: 2    10. Slow transit constipation  Linzess as directed.  Patient is also on Colace and lactulose  - Linaclotide (LINZESS) 290 MCG Cap; Take 1 Cap by mouth every day.  Dispense: 90 Cap; Refill: 3    11. Gastroesophageal reflux disease with esophagitis  This is a chronic medical condition that is currently stable  Continue omeprazole  - omeprazole (PRILOSEC) 40 MG delayed-release capsule; Take 1 Cap by mouth every day.  Dispense: 90 Cap; Refill: 3    12. Pulmonary hypertension (HCC)  This is a chronic medical condition that is currently stable  Continue aspirin daily  - aspirin 81 MG tablet; Take 1 Tab by mouth every day.  Dispense: 90 Tab; Refill: 3    13. Primary insomnia  This is a chronic medical condition that is currently stable  Continue trazodone  - traZODone (DESYREL) 50 MG Tab; Take 2 Tabs by mouth every bedtime.  Dispense: 180 Tab; Refill: 3    14. Drug-induced constipation  See #10  - lactulose 10 GM/15ML Solution; TAKE 30ML BY MOUTH TWICE DAILY  Dispense: 1800 mL; Refill: 3    15. Recurrent major depressive disorder, in partial remission (HCC)  Continue sertraline 100 mg daily refill alprazolam 1 mg 3 times a day.  [facility] reviewed.  Trolled substance agreement on chart.  Follow-up in 3 months  - sertraline (ZOLOFT) 100 MG Tab; Take 1 Tab by mouth every bedtime.  Dispense: 90 Tab; Refill: 3    16. Hyperglycemia  Continue to monitor sugars when steroids are increased  - FREESTYLE LITE strip; USE TO TEST EVERY DAY AS NEEDED SSX HIGH OR LOW, BID  Dispense: 100 Strip; Refill: 3    17. Acquired hypothyroidism  This is a chronic medical condition that is currently stable  Patient is due for labs.  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 Incisional hernia, without obstruction or gangrene  Patient to follow-up with surgeon at [facility].  We did discuss signs and symptoms of an incarcerated hernia    20. Chronic prescription benzodiazepine use  Discussed risk of chronic use  Continue sertraline 100 mg daily refill alprazolam 1 mg 3 times a day.  [facility]  Trolled substance agreement on chart.  Follow-up in 3 months    21. Mild aortic regurgitation and aortic valve sclerosis  This is a chronic medical condition that is currently stable    22. Mild mitral regurgitation  This is a chronic medical condition that is currently stable      23. On prednisone therapy  This is a chronic medical condition that is currently stable      24. Ostium secundum type atrial septal defect, S/P percutaneous closure  This is a chronic medical condition that is currently stable      25. Primary pulmonary hypertension (HCC)  This is a chronic medical condition that is currently stable  Continue aspirin therapy    26. S/P panniculectomy  This is a chronic medical condition that is currently stable    27. Sarcoidosis (HCC)  This is a chronic medical condition that is currently stable  Pulmonology following    28. Splenomegaly  This is a chronic medical condition that is currently stable  Oncology following    29. Status post liver transplant Dr. [place] ([facility])  This is a chronic medical condition that is currently stable    30. Steroid-induced hyperglycemia  Dietary modifications discussed    31. Thrombocytopenia (HCC)  This is a chronic medical condition that is currently stable  Hematology following    32. Vitamin D deficiency  Continue vitamin D supplementation    33. History of infection with vancomycin resistant Enterococcus (VRE)  This is a chronic medical condition that is currently  stable      34. Numbness and tingling of left thumb  Refer to neurology  - REFERRAL TO NEUROLOGY      Discussion today about general wellness and lifestyle habits:    · Engage in regular physical activity and social activities.  · Prevent falls and reduce trip hazards; using ambulatory aides, hearing and vision testing if appropriate.  · Steps to improve urinary incontinence.  · Advanced care planning.    Follow-Up: Return in about 3 months (around 4/30/2019).         PLEASE NOTE: This dictation was created using voice recognition software. I have made every reasonable attempt to correct obvious errors, but I expect that there are errors of grammar and possibly content that I did not discover before finalizing the note.

## 2019-02-05 NOTE — ASSESSMENT & PLAN NOTE
Stable. Currently taking sertraline 100 mg as prescribed.   Denies side effects and is tolerating well.  Mood is improved with current medication and therapy.    Patient denies SI/HI.  Depression Screen (PHQ-2/PHQ-9) 11/19/2018 11/23/2018 11/28/2018   PHQ-2 Total Score 0 0 0   PHQ-2 Total Score - - -   PHQ-2 Total Score - - -   PHQ-9 Total Score - - -

## 2019-02-05 NOTE — ASSESSMENT & PLAN NOTE
Patient has interstitial lung disease as well as a hepatopulmonary shunt that results in hypoxia.  She is on chronic high flow oxygen.  Pulmonology does follow.

## 2019-02-06 RX ORDER — LEVOTHYROXINE SODIUM 137 UG/1
137 TABLET ORAL
Qty: 90 TAB | Refills: 1 | Status: SHIPPED | OUTPATIENT
Start: 2019-02-06 | End: 2019-05-02 | Stop reason: SDUPTHER

## 2019-02-06 NOTE — TELEPHONE ENCOUNTER
Was the patient seen in the last year in this department? Yes  LOV-1/30/19  Does patient have an active prescription for medications requested? No     Received Request Via: Pharmacy

## 2019-02-27 RX ORDER — ASPIRIN 81 MG/1
81 TABLET ORAL DAILY
Qty: 90 TAB | Refills: 4 | Status: ON HOLD | OUTPATIENT
Start: 2019-02-27 | End: 2021-08-03

## 2019-02-28 NOTE — TELEPHONE ENCOUNTER
Was the patient seen in the last year in this department? Yes 1-    Does patient have an active prescription for medications requested? No     Received Request Via: Pharmacy

## 2019-03-04 ENCOUNTER — HOSPITAL ENCOUNTER (INPATIENT)
Facility: MEDICAL CENTER | Age: 59
LOS: 1 days | DRG: 392 | End: 2019-03-06
Attending: EMERGENCY MEDICINE | Admitting: HOSPITALIST
Payer: MEDICARE

## 2019-03-04 ENCOUNTER — OFFICE VISIT (OUTPATIENT)
Dept: CARDIOLOGY | Facility: MEDICAL CENTER | Age: 59
End: 2019-03-04
Payer: MEDICARE

## 2019-03-04 ENCOUNTER — APPOINTMENT (OUTPATIENT)
Dept: RADIOLOGY | Facility: MEDICAL CENTER | Age: 59
DRG: 392 | End: 2019-03-04
Attending: EMERGENCY MEDICINE
Payer: MEDICARE

## 2019-03-04 VITALS
WEIGHT: 137 LBS | DIASTOLIC BLOOD PRESSURE: 40 MMHG | HEIGHT: 68 IN | SYSTOLIC BLOOD PRESSURE: 64 MMHG | BODY MASS INDEX: 20.76 KG/M2 | OXYGEN SATURATION: 97 % | HEART RATE: 78 BPM

## 2019-03-04 DIAGNOSIS — J84.9 INTERSTITIAL LUNG DISEASE (HCC): ICD-10-CM

## 2019-03-04 DIAGNOSIS — K76.81 HEPATOPULMONARY SYNDROME (HCC): ICD-10-CM

## 2019-03-04 DIAGNOSIS — I25.2 H/O NON-ST ELEVATION MYOCARDIAL INFARCTION (NSTEMI): ICD-10-CM

## 2019-03-04 DIAGNOSIS — Z87.19 HISTORY OF PANCREATITIS: ICD-10-CM

## 2019-03-04 DIAGNOSIS — J96.11 CHRONIC RESPIRATORY FAILURE WITH HYPOXIA (HCC): ICD-10-CM

## 2019-03-04 DIAGNOSIS — N18.30 CKD (CHRONIC KIDNEY DISEASE) STAGE 3, GFR 30-59 ML/MIN (HCC): ICD-10-CM

## 2019-03-04 DIAGNOSIS — Z79.899 CHRONIC PRESCRIPTION BENZODIAZEPINE USE: ICD-10-CM

## 2019-03-04 DIAGNOSIS — E27.1 ADDISON'S DISEASE (HCC): ICD-10-CM

## 2019-03-04 DIAGNOSIS — Z79.899 HIGH RISK MEDICATION USE: ICD-10-CM

## 2019-03-04 DIAGNOSIS — E03.9 ACQUIRED HYPOTHYROIDISM: ICD-10-CM

## 2019-03-04 DIAGNOSIS — Q21.11 OSTIUM SECUNDUM TYPE ATRIAL SEPTAL DEFECT: ICD-10-CM

## 2019-03-04 DIAGNOSIS — I35.1 MILD AORTIC REGURGITATION: ICD-10-CM

## 2019-03-04 DIAGNOSIS — E78.00 PURE HYPERCHOLESTEROLEMIA: ICD-10-CM

## 2019-03-04 DIAGNOSIS — I27.0 PRIMARY PULMONARY HYPERTENSION (HCC): ICD-10-CM

## 2019-03-04 DIAGNOSIS — G89.4 CHRONIC PAIN SYNDROME: ICD-10-CM

## 2019-03-04 DIAGNOSIS — Z86.19 HISTORY OF INFECTION WITH VANCOMYCIN RESISTANT ENTEROCOCCUS (VRE): ICD-10-CM

## 2019-03-04 DIAGNOSIS — D84.9 IMMUNOCOMPROMISED STATE (HCC): ICD-10-CM

## 2019-03-04 DIAGNOSIS — F41.1 GAD (GENERALIZED ANXIETY DISORDER): ICD-10-CM

## 2019-03-04 LAB
ALBUMIN SERPL BCP-MCNC: 3.7 G/DL (ref 3.2–4.9)
ALBUMIN/GLOB SERPL: 2.3 G/DL
ALP SERPL-CCNC: 39 U/L (ref 30–99)
ALT SERPL-CCNC: 34 U/L (ref 2–50)
ANION GAP SERPL CALC-SCNC: 5 MMOL/L (ref 0–11.9)
APPEARANCE UR: CLEAR
AST SERPL-CCNC: 33 U/L (ref 12–45)
BACTERIA #/AREA URNS HPF: NEGATIVE /HPF
BASOPHILS # BLD AUTO: 0.3 % (ref 0–1.8)
BASOPHILS # BLD: 0.01 K/UL (ref 0–0.12)
BILIRUB SERPL-MCNC: 0.3 MG/DL (ref 0.1–1.5)
BILIRUB UR QL STRIP.AUTO: NEGATIVE
BUN SERPL-MCNC: 15 MG/DL (ref 8–22)
CALCIUM SERPL-MCNC: 9.7 MG/DL (ref 8.5–10.5)
CHLORIDE SERPL-SCNC: 106 MMOL/L (ref 96–112)
CO2 SERPL-SCNC: 30 MMOL/L (ref 20–33)
COLOR UR: YELLOW
CREAT SERPL-MCNC: 0.81 MG/DL (ref 0.5–1.4)
EOSINOPHIL # BLD AUTO: 0.1 K/UL (ref 0–0.51)
EOSINOPHIL NFR BLD: 2.8 % (ref 0–6.9)
EPI CELLS #/AREA URNS HPF: NEGATIVE /HPF
ERYTHROCYTE [DISTWIDTH] IN BLOOD BY AUTOMATED COUNT: 50 FL (ref 35.9–50)
FLUAV RNA SPEC QL NAA+PROBE: NEGATIVE
FLUBV RNA SPEC QL NAA+PROBE: NEGATIVE
GLOBULIN SER CALC-MCNC: 1.6 G/DL (ref 1.9–3.5)
GLUCOSE SERPL-MCNC: 90 MG/DL (ref 65–99)
GLUCOSE UR STRIP.AUTO-MCNC: NEGATIVE MG/DL
HCT VFR BLD AUTO: 35 % (ref 37–47)
HGB BLD-MCNC: 11.5 G/DL (ref 12–16)
HYALINE CASTS #/AREA URNS LPF: ABNORMAL /LPF
IMM GRANULOCYTES # BLD AUTO: 0.01 K/UL (ref 0–0.11)
IMM GRANULOCYTES NFR BLD AUTO: 0.3 % (ref 0–0.9)
KETONES UR STRIP.AUTO-MCNC: NEGATIVE MG/DL
LACTATE BLD-SCNC: 0.6 MMOL/L (ref 0.5–2)
LEUKOCYTE ESTERASE UR QL STRIP.AUTO: ABNORMAL
LYMPHOCYTES # BLD AUTO: 0.94 K/UL (ref 1–4.8)
LYMPHOCYTES NFR BLD: 26.7 % (ref 22–41)
MCH RBC QN AUTO: 32 PG (ref 27–33)
MCHC RBC AUTO-ENTMCNC: 32.9 G/DL (ref 33.6–35)
MCV RBC AUTO: 97.5 FL (ref 81.4–97.8)
MICRO URNS: ABNORMAL
MONOCYTES # BLD AUTO: 0.26 K/UL (ref 0–0.85)
MONOCYTES NFR BLD AUTO: 7.4 % (ref 0–13.4)
NEUTROPHILS # BLD AUTO: 2.2 K/UL (ref 2–7.15)
NEUTROPHILS NFR BLD: 62.5 % (ref 44–72)
NITRITE UR QL STRIP.AUTO: NEGATIVE
NRBC # BLD AUTO: 0 K/UL
NRBC BLD-RTO: 0 /100 WBC
PH UR STRIP.AUTO: 7 [PH]
PLATELET # BLD AUTO: 60 K/UL (ref 164–446)
PMV BLD AUTO: 8.4 FL (ref 9–12.9)
POTASSIUM SERPL-SCNC: 4.2 MMOL/L (ref 3.6–5.5)
PROCALCITONIN SERPL-MCNC: 0.05 NG/ML
PROT SERPL-MCNC: 5.3 G/DL (ref 6–8.2)
PROT UR QL STRIP: NEGATIVE MG/DL
RBC # BLD AUTO: 3.59 M/UL (ref 4.2–5.4)
RBC # URNS HPF: ABNORMAL /HPF
RBC UR QL AUTO: NEGATIVE
SODIUM SERPL-SCNC: 141 MMOL/L (ref 135–145)
SP GR UR STRIP.AUTO: 1.04
UROBILINOGEN UR STRIP.AUTO-MCNC: 0.2 MG/DL
WBC # BLD AUTO: 3.5 K/UL (ref 4.8–10.8)
WBC #/AREA URNS HPF: ABNORMAL /HPF

## 2019-03-04 PROCEDURE — 85025 COMPLETE CBC W/AUTO DIFF WBC: CPT

## 2019-03-04 PROCEDURE — 700102 HCHG RX REV CODE 250 W/ 637 OVERRIDE(OP): Performed by: EMERGENCY MEDICINE

## 2019-03-04 PROCEDURE — 83605 ASSAY OF LACTIC ACID: CPT

## 2019-03-04 PROCEDURE — 99291 CRITICAL CARE FIRST HOUR: CPT

## 2019-03-04 PROCEDURE — 74177 CT ABD & PELVIS W/CONTRAST: CPT

## 2019-03-04 PROCEDURE — 87040 BLOOD CULTURE FOR BACTERIA: CPT | Mod: 91

## 2019-03-04 PROCEDURE — 700102 HCHG RX REV CODE 250 W/ 637 OVERRIDE(OP): Performed by: HOSPITALIST

## 2019-03-04 PROCEDURE — 700111 HCHG RX REV CODE 636 W/ 250 OVERRIDE (IP): Performed by: HOSPITALIST

## 2019-03-04 PROCEDURE — A9270 NON-COVERED ITEM OR SERVICE: HCPCS | Performed by: EMERGENCY MEDICINE

## 2019-03-04 PROCEDURE — 81001 URINALYSIS AUTO W/SCOPE: CPT

## 2019-03-04 PROCEDURE — 99220 PR INITIAL OBSERVATION CARE,LEVL III: CPT | Performed by: HOSPITALIST

## 2019-03-04 PROCEDURE — 71045 X-RAY EXAM CHEST 1 VIEW: CPT

## 2019-03-04 PROCEDURE — A9270 NON-COVERED ITEM OR SERVICE: HCPCS | Performed by: HOSPITALIST

## 2019-03-04 PROCEDURE — 84145 PROCALCITONIN (PCT): CPT

## 2019-03-04 PROCEDURE — 700111 HCHG RX REV CODE 636 W/ 250 OVERRIDE (IP): Performed by: EMERGENCY MEDICINE

## 2019-03-04 PROCEDURE — 700117 HCHG RX CONTRAST REV CODE 255: Performed by: EMERGENCY MEDICINE

## 2019-03-04 PROCEDURE — 96376 TX/PRO/DX INJ SAME DRUG ADON: CPT

## 2019-03-04 PROCEDURE — G0378 HOSPITAL OBSERVATION PER HR: HCPCS

## 2019-03-04 PROCEDURE — 80053 COMPREHEN METABOLIC PANEL: CPT

## 2019-03-04 PROCEDURE — 87502 INFLUENZA DNA AMP PROBE: CPT

## 2019-03-04 PROCEDURE — 87086 URINE CULTURE/COLONY COUNT: CPT

## 2019-03-04 PROCEDURE — 96374 THER/PROPH/DIAG INJ IV PUSH: CPT

## 2019-03-04 PROCEDURE — 99215 OFFICE O/P EST HI 40 MIN: CPT | Performed by: INTERNAL MEDICINE

## 2019-03-04 PROCEDURE — 304561 HCHG STAT O2

## 2019-03-04 PROCEDURE — 96375 TX/PRO/DX INJ NEW DRUG ADDON: CPT

## 2019-03-04 RX ORDER — OXYCODONE HYDROCHLORIDE 10 MG/1
10 TABLET ORAL
Status: DISCONTINUED | OUTPATIENT
Start: 2019-03-04 | End: 2019-03-05

## 2019-03-04 RX ORDER — ALPRAZOLAM 1 MG/1
1 TABLET ORAL 3 TIMES DAILY
COMMUNITY
End: 2019-05-02

## 2019-03-04 RX ORDER — FUROSEMIDE 40 MG/1
20 TABLET ORAL DAILY
Status: DISCONTINUED | OUTPATIENT
Start: 2019-03-05 | End: 2019-03-05

## 2019-03-04 RX ORDER — GABAPENTIN 300 MG/1
600 CAPSULE ORAL DAILY
Status: DISCONTINUED | OUTPATIENT
Start: 2019-03-05 | End: 2019-03-04

## 2019-03-04 RX ORDER — MORPHINE SULFATE 4 MG/ML
4 INJECTION, SOLUTION INTRAMUSCULAR; INTRAVENOUS ONCE
Status: COMPLETED | OUTPATIENT
Start: 2019-03-04 | End: 2019-03-04

## 2019-03-04 RX ORDER — SODIUM CHLORIDE 9 MG/ML
INJECTION, SOLUTION INTRAVENOUS CONTINUOUS
Status: DISCONTINUED | OUTPATIENT
Start: 2019-03-04 | End: 2019-03-05

## 2019-03-04 RX ORDER — OXYCODONE HYDROCHLORIDE 5 MG/1
5 TABLET ORAL
Status: DISCONTINUED | OUTPATIENT
Start: 2019-03-04 | End: 2019-03-05

## 2019-03-04 RX ORDER — TADALAFIL 20 MG/1
20 TABLET ORAL
Status: DISCONTINUED | OUTPATIENT
Start: 2019-03-04 | End: 2019-03-05

## 2019-03-04 RX ORDER — OMEPRAZOLE 20 MG/1
40 CAPSULE, DELAYED RELEASE ORAL DAILY
Status: DISCONTINUED | OUTPATIENT
Start: 2019-03-05 | End: 2019-03-06 | Stop reason: HOSPADM

## 2019-03-04 RX ORDER — TRAZODONE HYDROCHLORIDE 50 MG/1
100 TABLET ORAL
Status: DISCONTINUED | OUTPATIENT
Start: 2019-03-04 | End: 2019-03-06 | Stop reason: HOSPADM

## 2019-03-04 RX ORDER — AMOXICILLIN 250 MG
2 CAPSULE ORAL 2 TIMES DAILY
Status: DISCONTINUED | OUTPATIENT
Start: 2019-03-04 | End: 2019-03-06 | Stop reason: HOSPADM

## 2019-03-04 RX ORDER — PREDNISONE 5 MG/1
5 TABLET ORAL EVERY MORNING
Status: DISCONTINUED | OUTPATIENT
Start: 2019-03-05 | End: 2019-03-05

## 2019-03-04 RX ORDER — GABAPENTIN 300 MG/1
600 CAPSULE ORAL 3 TIMES DAILY
Status: DISCONTINUED | OUTPATIENT
Start: 2019-03-04 | End: 2019-03-06 | Stop reason: HOSPADM

## 2019-03-04 RX ORDER — TACROLIMUS 0.5 MG/1
1.5 CAPSULE ORAL EVERY 12 HOURS
Status: DISCONTINUED | OUTPATIENT
Start: 2019-03-04 | End: 2019-03-06 | Stop reason: HOSPADM

## 2019-03-04 RX ORDER — HYDROMORPHONE HYDROCHLORIDE 1 MG/ML
0.5 INJECTION, SOLUTION INTRAMUSCULAR; INTRAVENOUS; SUBCUTANEOUS
Status: DISCONTINUED | OUTPATIENT
Start: 2019-03-04 | End: 2019-03-05

## 2019-03-04 RX ORDER — ALPRAZOLAM 1 MG/1
1 TABLET ORAL 3 TIMES DAILY
Status: DISCONTINUED | OUTPATIENT
Start: 2019-03-05 | End: 2019-03-06 | Stop reason: HOSPADM

## 2019-03-04 RX ORDER — AMBRISENTAN 10 MG/1
10 TABLET, FILM COATED ORAL DAILY
Status: DISCONTINUED | OUTPATIENT
Start: 2019-03-05 | End: 2019-03-05

## 2019-03-04 RX ORDER — MIDODRINE HYDROCHLORIDE 5 MG/1
5-15 TABLET ORAL
Status: DISCONTINUED | OUTPATIENT
Start: 2019-03-04 | End: 2019-03-05

## 2019-03-04 RX ORDER — MYCOPHENOLATE MOFETIL 250 MG/1
500 CAPSULE ORAL EVERY 12 HOURS
Status: DISCONTINUED | OUTPATIENT
Start: 2019-03-04 | End: 2019-03-06 | Stop reason: HOSPADM

## 2019-03-04 RX ORDER — FUROSEMIDE 40 MG/1
20 TABLET ORAL DAILY
Status: ON HOLD | COMMUNITY
End: 2019-03-06

## 2019-03-04 RX ORDER — LEVOTHYROXINE SODIUM 137 UG/1
137 TABLET ORAL
Status: DISCONTINUED | OUTPATIENT
Start: 2019-03-05 | End: 2019-03-06 | Stop reason: HOSPADM

## 2019-03-04 RX ORDER — PRAVASTATIN SODIUM 20 MG
20 TABLET ORAL DAILY
Status: DISCONTINUED | OUTPATIENT
Start: 2019-03-05 | End: 2019-03-06 | Stop reason: HOSPADM

## 2019-03-04 RX ORDER — BISACODYL 10 MG
10 SUPPOSITORY, RECTAL RECTAL
Status: DISCONTINUED | OUTPATIENT
Start: 2019-03-04 | End: 2019-03-06 | Stop reason: HOSPADM

## 2019-03-04 RX ORDER — POLYETHYLENE GLYCOL 3350 17 G/17G
1 POWDER, FOR SOLUTION ORAL
Status: DISCONTINUED | OUTPATIENT
Start: 2019-03-04 | End: 2019-03-06 | Stop reason: HOSPADM

## 2019-03-04 RX ADMIN — IOHEXOL 100 ML: 350 INJECTION, SOLUTION INTRAVENOUS at 18:29

## 2019-03-04 RX ADMIN — TADALAFIL 20 MG: 20 TABLET ORAL at 21:15

## 2019-03-04 RX ADMIN — GABAPENTIN 600 MG: 300 CAPSULE ORAL at 23:03

## 2019-03-04 RX ADMIN — MORPHINE SULFATE 4 MG: 4 INJECTION INTRAVENOUS at 16:57

## 2019-03-04 RX ADMIN — SERTRALINE HYDROCHLORIDE 100 MG: 100 TABLET ORAL at 23:03

## 2019-03-04 RX ADMIN — TACROLIMUS 1.5 MG: 0.5 CAPSULE ORAL at 23:03

## 2019-03-04 RX ADMIN — TRAZODONE HYDROCHLORIDE 100 MG: 50 TABLET ORAL at 21:15

## 2019-03-04 RX ADMIN — MYCOPHENOLATE MOFETIL 500 MG: 250 CAPSULE ORAL at 23:03

## 2019-03-04 RX ADMIN — MORPHINE SULFATE 4 MG: 4 INJECTION INTRAVENOUS at 20:15

## 2019-03-04 ASSESSMENT — ENCOUNTER SYMPTOMS
CHILLS: 0
CARDIOVASCULAR NEGATIVE: 1
RESPIRATORY NEGATIVE: 1
CONSTITUTIONAL NEGATIVE: 1
SORE THROAT: 0
STRIDOR: 0
ORTHOPNEA: 0
EYES NEGATIVE: 1
CLAUDICATION: 0
WHEEZING: 0
LOSS OF CONSCIOUSNESS: 0
DIZZINESS: 0
SPUTUM PRODUCTION: 0
NEUROLOGICAL NEGATIVE: 1
FEVER: 0
PALPITATIONS: 0
WEAKNESS: 0
MUSCULOSKELETAL NEGATIVE: 1
HEMOPTYSIS: 0
SHORTNESS OF BREATH: 0
COUGH: 0
GASTROINTESTINAL NEGATIVE: 1
PND: 0
BRUISES/BLEEDS EASILY: 0

## 2019-03-04 NOTE — PROGRESS NOTES
"Chief Complaint   Patient presents with   • Hypertension       Subjective:   Roxana Cuba is a 59 y.o. female who presents today as a follow-up for her pulmonary hypertension chronic kidney disease and liver transplant.  Since she was last seen she is developed what she thinks is sepsis.  She is having lethargy headaches hot flashes.  At check-in today her blood pressure 64/40.  She denies taking additional medications.  She is scheduled for 1 of her PD E5 inhibitors later on this evening.  She is not orthostatic but she feels weak.    Past Medical History:   Diagnosis Date   • Anemia    • Anesthesia     \"takes more to get me under, would rather be intubated\"    • Breath shortness     cont oxygen 5L (Preffered)   • Bronchitis      2016   • Cardiomegaly    • Chronic fatigue and malaise    • Cirrhosis ([facility]) December 2011    Status post [facility] [place] [facility]   • CKD (chronic kidney disease) stage 3, GFR 30-59 ml/min ([facility]ly [facility])    • Diabetes ([facility]ly [facility])     reports hx of, resolved w/weight loss. Reports still checking bloodsugars twice daily and using insulin PRN   • Fracture of left foot    • GERD (gastroesophageal reflux disease)         • H/O Clostridium difficile infection 02-17-17    reports \"16 months ago\". Current stool sample 01-26-17 neg   • Hemorrhagic disorder ([facility])     \"low platelets\" bruises easily    • Hiatus hernia syndrome    • High cholesterol    • Hypothyroid    • Jaundice 2009   • Liver transplanted ([facility])    • Low back pain 02-17-17    and left foot, 7/10   • Mild aortic regurgitation and aortic valve sclerosis     • On home oxygen therapy     5 liters, Dr. Gaming   • Platelet disorder ([facility]ly [facility])     low platelets   • Pneumonia     aspiration,    • Psychiatric disorder     Mood disorder   • Pulmonary hypertension ([facility])        • S/P cholecystectomy    • Small bowel obstruction ([facility])     01-   • Splenomegaly    • VRE (vancomycin-resistant Enterococci)     02-17-17, pt declines knowledge of " this     Past Surgical History:   Procedure Laterality Date   • VENTRAL HERNIA REPAIR ROBOTIC XI N/A 11/12/2018    Procedure: VENTRAL HERNIA REPAIR ROBOTIC XI- FOR EPIGASTRIC INCISIONAL;  Surgeon: John H Ganser, M.D.;  Location: SURGERY [facility];  Service: General   • TURBINATE REDUCTION Bilateral 10/4/2018    Procedure: TURBINATE REDUCTION;  Surgeon: Silviano Erazo M.D.;  Location: SURGERY SAME DAY [facility];  Service: Ent   • NASAL RECONSTRUCTION Bilateral 10/4/2018    Procedure: NASAL RECONSTRUCTION- LATERA IMPLANTS;  Surgeon: Silviano Erazo M.D.;  Location: SURGERY SAME DAY [facility];  Service: Ent   • OTHER  12/11/2017    paniculectomy   • COLONOSCOPY N/A 3/27/2017    Procedure: COLONOSCOPY;  Surgeon: Osman Matt M.D.;  Location: SURGERY SAME DAY [facility];  Service:    • GASTROSCOPY N/A 3/27/2017    Procedure: GASTROSCOPY;  Surgeon: Osman Matt M.D.;  Location: SURGERY SAME DAY [facility];  Service:    • BREAST BIOPSY Left 2/21/2017    Procedure: BREAST BIOPSY - WIRE LOCALIZED EXCISONAL ;  Surgeon: Jane Zhao M.D.;  Location: SURGERY SAME DAY [facility];  Service:    • LUNG BIOPSY OPEN  11/2016   • OTHER ABDOMINAL SURGERY      Gasric Sleeve   • BONE MARROW ASPIRATION  3/16/2015    Performed by Freddie Hi M.D. at ENDOSCOPY [facility]   • BONE MARROW BIOPSY, NDL/TROCAR  3/16/2015    Performed by Freddie Hi M.D. at ENDOSCOPY [facility]   • RECOVERY  3/31/2014    Performed by [facility] at [facility]ERY [facility]   • RECOVERY  3/24/2014    Performed by [facility] at SURGERY SAME DAY [facility]   • RECOVERY  1/21/2014    Performed by [facility] at SUR[facility]ERY SAME DAY [facility]   • BRONCHOSCOPY-ENDO  11/15/2013    Performed by Ha Perez M.D. at ENDOSCOPY [facility]   • RECOVERY  2/27/2013    Performed by [facility] at SURGERY SAME DAY [facility]   • BONE MARROW ASPIRATION  12/31/2012    Performed by  Josemanuel Real M.D. at ENDOSCOPY [facility]   • BONE MARROW BIOPSY, NDL/TROCAR  12/31/2012    Performed by Josemanuel Real M.D. at ENDOSCOPY [facility]   • GASTROSCOPY  9/28/2012    Performed by Aravind Richards M.D. at SURGERY [facility]   • SIGMOIDOSCOPY FLEX  9/26/2012    Performed by Kristopher Cardoso M.D. at ENDOSCOPY [facility]   • BRONCHOSCOPY-ENDO  6/19/2012    Performed by MARLENA MURILLO at ENDOSCOPY [facility]   • BRONCHOSCOPY-ENDO  5/29/2012    Performed by SUYAPA CAMARENA at ENDOSCOPY [facility]   • OTHER ABDOMINAL SURGERY  December 2011    Liver transplant at [facility] [place] [facility] by Dr. Canada.   • GASTROSCOPY-ENDO  3/12/2010    Performed by CAMELIA SAMANO at ENDOSCOPY [facility]   • EXAM UNDER ANESTHESIA  11/11/2009    Performed by BIRD ABDI at SURGERY [facility]   • HEMORRHOIDECTOMY  11/11/2009    Performed by BIRD ABDI at SURGERY [facility]   • KELBY BY LAPAROSCOPY  9/19/2009    Performed by BIRD ABDI at SURGERY [facility]   • CARPAL TUNNEL RELEASE          • KELBY BY LAPAROSCOPY     • GASTRIC BYPASS LAPAROSCOPIC     • HERNIA REPAIR      x3   • HYSTERECTOMY, TOTAL ABDOMINAL          • OTHER      Breast reduction   • OTHER      liver transplant   • PB ANESTH,NOSE,SINUS SURGERY      x4   • TONSILLECTOMY       Family History   Problem Relation Age of Onset   • Other Father         Unknown (dead 10 years)   • Diabetes Father    • Heart Disease Father    • Hypertension Father    • Hyperlipidemia Father    • Stroke Father    • Non-contributory Mother    • Hyperlipidemia Mother    • Alcohol/Drug Mother    • Cancer Paternal Aunt    • Alcohol/Drug Maternal Grandmother    • Alcohol/Drug Maternal Grandfather      Social History     Social History   • Marital status:      Spouse name: N/A   • Number of children: N/A   • Years of education: N/A     Occupational History   • Not on file.     Social History Main Topics   • Smoking status: Never  Smoker   • Smokeless tobacco: Never Used      Comment: avoid all tobacco products   • Alcohol use No      Comment: 05/2009 quit drinking   • Drug use: No   • Sexual activity: Not on file     Other Topics Concern   • Not on file     Social History Narrative   • No narrative on file     Allergies   Allergen Reactions   • Rhubarb Anaphylaxis   • Organic Nitrates      Nitroglycerin products should be avoided with the use of PDE-5 inhibitors as the combination can result in severe hypotension.  RD clarified with pt: Nitrates in food are okay and pt does not want nitrates to be listed as food allergy. Pt only avoids medications with nitrates.    • Vancomycin Hcl      Causes red man syndrome when infused to fast     • Acetaminophen      Can not take, hx of liver transplant    • Nsaids      Can not take due to hx of liver transplant    • Other Drug      Any medication that may interact with cyclosporine, tacrolimus, sirolimus, or prograf (due to hx of liver transplant)      Outpatient Encounter Prescriptions as of 3/4/2019   Medication Sig Dispense Refill   • aspirin 81 MG EC tablet TAKE 1 TAB BY MOUTH EVERY DAY. 90 Tab 4   • levothyroxine (SYNTHROID) 137 MCG Tab TAKE 1 TAB BY MOUTH EVERY MORNING ON AN EMPTY STOMACH. 90 Tab 1   • Linaclotide (LINZESS) 290 MCG Cap Take 1 Cap by mouth every day. 90 Cap 3   • omeprazole (PRILOSEC) 40 MG delayed-release capsule Take 1 Cap by mouth every day. 90 Cap 3   • traZODone (DESYREL) 50 MG Tab Take 2 Tabs by mouth every bedtime. 180 Tab 3   • lactulose 10 GM/15ML Solution TAKE 30ML BY MOUTH TWICE DAILY 1800 mL 3   • sertraline (ZOLOFT) 100 MG Tab Take 1 Tab by mouth every bedtime. 90 Tab 3   • FREESTYLE LITE strip USE TO TEST EVERY DAY AS NEEDED SSX HIGH OR LOW,  Strip 3   • calcium citrate 315 mg-vitamin D 200 mg (CITRACAL+D) 315-200 MG-UNIT per tablet Take 1 Tab by mouth every day. 90 Tab 3   • ALPRAZolam (XANAX) 1 MG Tab Take 1 Tab by mouth 3 times a day as needed for Sleep for  up to 30 days. 90 Tab 2   • [employer] GENTLE LAXATIVE 10 MG Suppos INSERT 1 SUPPOSITORY IN RECTUM 1 TIME DAILY AS NEEDED (CONSTIPATION). 30 Suppository 3   • gabapentin (NEURONTIN) 600 MG tablet TAKE 1 TABLET BY MOUTH THREE TIMES A DAY 90 Tab 1   • ambrisentan (LETAIRIS) 10 MG tablet Take 1 Tab by mouth every day. 90 Tab 3   • fluticasone (FLONASE) 50 MCG/ACT nasal spray SPRAY 1 SPRAY IN EACH NOSTRIL TWICE A DAY 48 g 3   • furosemide (LASIX) 40 MG Tab Take 1 Tab by mouth every morning. 90 Tab 2   • SPIRIVA HANDIHALER 18 MCG Cap INHALE 1 CAPSULE VIA HANDIHALER ONCE DAILY AT THE SAME TIME EVERY DAY 30 Cap 6   • Tadalafil, PAH, (ADCIRCA) 20 MG Tab Take 20 mg by mouth every bedtime. 90 Tab 3   • sennosides (SENOKOT) 8.6 MG Tab Take 17.2 mg by mouth every 12 hours.     • calcium polycarbophil (FIBERCON) 625 MG Tab Take 625 mg by mouth every morning.     • midodrine (PROAMATINE) 5 MG Tab Take 5-15 mg by mouth 1 time daily as needed.     • bisacodyl ([employer] GENTLE LAXATIVE) 10 MG Suppos Insert 20 mg in rectum every morning.     • ferrous sulfate (FEOSOL) 220 (44 Fe) MG/5ML Elixir Take 220 mg by mouth every evening.     • ondansetron (ZOFRAN ODT) 4 MG TABLET DISPERSIBLE Take 4 mg by mouth every 8 hours as needed for Nausea.     • predniSONE (DELTASONE) 5 MG Tab Take 5 mg by mouth every morning. Pt also has an RX for 2MG, pt takes total of 7MG QDAY      • tacrolimus (PROGRAF) 1 MG Cap Take 1 mg by mouth every 12 hours. Pt also has an RX for 0.5MG, pt takes total of 1.5MG BID      • Probiotic Product (PROBIOTIC DAILY PO) Take 1 Cap by mouth every evening.     • pravastatin (PRAVACHOL) 20 MG Tab Take 1 Tab by mouth every day. 90 Tab 2   • docusate sodium (COLACE) 100 MG Cap Take 100 mg by mouth See Admin Instructions. Three times a day at 0700, 1200, and 1800     • predniSONE (DELTASONE) 1 MG Tab Take 2 mg by mouth every morning. Pt also has an RX for 5MG, pt takes total of 7MG QDAY     • NON SPECIFIED Take 1 Cap by mouth every  "bedtime. Bariatric Dietary Supplment      • tacrolimus (PROGRAF) 0.5 MG Cap Take 0.5 mg by mouth every 12 hours. Pt also has an RX for 1MG, pt takes total of 1.5MG BID     • ascorbic acid (ASCORBIC ACID) 500 MG Tab Take 500 mg by mouth every morning.     • mycophenolate (CELLCEPT) 250 MG Cap Take 500 mg by mouth every 12 hours.       No facility-administered encounter medications on file as of 3/4/2019.      Review of Systems   Constitutional: Negative.  Negative for chills, fever and malaise/fatigue.   HENT: Negative.  Negative for sore throat.    Eyes: Negative.    Respiratory: Negative.  Negative for cough, hemoptysis, sputum production, shortness of breath, wheezing and stridor.    Cardiovascular: Negative.  Negative for chest pain, palpitations, orthopnea, claudication, leg swelling and PND.   Gastrointestinal: Negative.    Genitourinary: Negative.    Musculoskeletal: Negative.    Skin: Negative.    Neurological: Negative.  Negative for dizziness, loss of consciousness and weakness.   Endo/Heme/Allergies: Negative.  Does not bruise/bleed easily.   All other systems reviewed and are negative.       Objective:   BP (!) 64/40 (BP Location: Left arm, Patient Position: Sitting, BP Cuff Size: Adult)   Pulse 78   Ht 1.727 m (5' 8\")   Wt 62.1 kg (137 lb)   LMP 01/03/2000   SpO2 97%   BMI 20.83 kg/m²     Physical Exam   Constitutional: She appears well-developed and well-nourished. No distress.   HENT:   Head: Normocephalic and atraumatic.   Right Ear: External ear normal.   Left Ear: External ear normal.   Nose: Nose normal.   Mouth/Throat: No oropharyngeal exudate.   Eyes: Pupils are equal, round, and reactive to light. Conjunctivae and EOM are normal. Right eye exhibits no discharge. Left eye exhibits no discharge. No scleral icterus.   Neck: Neck supple. No JVD present.   Cardiovascular: Normal rate, regular rhythm and intact distal pulses.  Exam reveals no gallop and no friction rub.    No murmur " heard.  Pulmonary/Chest: Effort normal. No stridor. No respiratory distress. She has no wheezes. She has no rales. She exhibits no tenderness.   Abdominal: Soft. She exhibits no distension. There is no guarding.   Musculoskeletal: Normal range of motion. She exhibits no edema, tenderness or deformity.   Neurological: She is alert. She has normal reflexes. She displays normal reflexes. No cranial nerve deficit. She exhibits normal muscle tone. Coordination normal.   Skin: Skin is warm and dry. No rash noted. She is not diaphoretic. No erythema. No pallor.   Psychiatric: She has a normal mood and affect. Her behavior is normal. Judgment and thought content normal.   Nursing note and vitals reviewed.      Assessment:     1. Acquired hypothyroidism     2. 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HCC)     3. Chronic pain syndrome     4. Chronic prescription benzodiazepine use     5. Chronic respiratory failure with hypoxia (HCC)     6. CKD (chronic kidney disease) stage 3, GFR 30-59 ml/min- unclear cause, probably multifactorial     7. DUC (generalized anxiety disorder)     8. H/O non-ST elevation myocardial infarction (NSTEMI)     9. Hepatopulmonary syndrome (HCC)     10. High risk medication use     11. History of infection with vancomycin resistant Enterococcus (VRE)     12. History of pancreatitis     13. Immunocompromised state (HCC)     14. Interstitial lung disease (HCC)     15. Mild aortic regurgitation and aortic valve sclerosis     16. Ostium secundum type atrial septal defect, S/P percutaneous closure     17. Primary pulmonary hypertension (HCC)     18. Pure hypercholesterolemia         Medical Decision Making:  Today's Assessment / Status / Plan:     59-year-old female with a liver transplant on immunosuppressive medications with pulmonary hypertension and pulmonary hypertension medications.  At this point given her low blood pressure and her concern for sepsis I would like her evaluated in the emergency room.  I am concerned  about worsening renal function in the setting of low blood pressure.  I do not think I can safely manage this is an outpatient.  She is been advised to go to the emergency room as quickly as possible.    Thank for you allowing me to take part in your patient's care, please call should you have any questions or would like to discuss this patient.

## 2019-03-04 NOTE — LETTER
"     [facility]   [address]   [address]  [place] [address]  Phone: [phone]  Fax: [phone]              Roxana Cuba  1960    Encounter Date: 3/4/2019    Maxwell Phan M.D.          PROGRESS NOTE:  Chief Complaint   Patient presents with   • Hypertension       Subjective:   Roxana Cuba is a 59 y.o. female who presents today as a follow-up for her pulmonary hypertension chronic kidney disease and liver transplant.  Since she was last seen she is developed what she thinks is sepsis.  She is having lethargy headaches hot flashes.  At check-in today her blood pressure 64/40.  She denies taking additional medications.  She is scheduled for 1 of her PD E5 inhibitors later on this evening.  She is not orthostatic but she feels weak.    Past Medical History:   Diagnosis Date   • Anemia    • Anesthesia     \"takes more to get me under, would rather be intubated\"    • Breath shortness     cont oxygen 5L (Preffered)   • Bronchitis      2016   • Cardiomegaly    • Chronic fatigue and malaise    • Cirrhosis (HCC) December 2011    Status post [facility]   • CKD (chronic kidney disease) stage 3, GFR 30-59 ml/min ([facility])    • Diabetes ([facility])     reports hx of, resolved w/weight loss. Reports still checking bloodsugars twice daily and using insulin PRN   • Fracture of left foot    • GERD (gastroesophageal reflux disease)         • H/O Clostridium difficile infection 02-17-17    reports \"16 months ago\". Current stool sample 01-26-17 neg   • Hemorrhagic disorder (HCC)     \"low platelets\" bruises easily    • Hiatus hernia syndrome    • High cholesterol    • Hypothyroid    • Jaundice 2009   • Liver transplanted (HCC)    • Low back pain 02-17-17    and left foot, 7/10   • Mild aortic regurgitation and aortic valve sclerosis     • On home oxygen therapy     5 liters, Dr. Gaming   • Platelet disorder (HCC)     low platelets   • Pneumonia    " aspiration,    • Psychiatric disorder     Mood disorder   • Pulmonary hypertension (HCC)        • S/P cholecystectomy    • Small bowel obstruction (HCC)     01-   • Splenomegaly    • VRE (vancomycin-resistant Enterococci)     02-17-17, pt declines knowledge of this     Past Surgical History:   Procedure Laterality Date   • VENTRAL HERNIA REPAIR ROBOTIC XI N/A 11/12/2018    Procedure: VENTRAL HERNIA REPAIR ROBOTIC XI- FOR EPIGASTRIC INCISIONAL;  Surgeon: John H Ganser, M.D.;  Location: SURGERY [facility];  Service: General   • TURBINATE REDUCTION Bilateral 10/4/2018    Procedure: TURBINATE REDUCTION;  Surgeon: Silviano Erazo M.D.;  Location: SURGERY SAME DAY [facility];  Service: Ent   • NASAL RECONSTRUCTION Bilateral 10/4/2018    Procedure: NASAL RECONSTRUCTION- LATERA IMPLANTS;  Surgeon: Silviano Erazo M.D.;  Location: SURGERY SAME DAY [facility];  Service: Ent   • OTHER  12/11/2017    paniculectomy   • COLONOSCOPY N/A 3/27/2017    Procedure: COLONOSCOPY;  Surgeon: Osman Matt M.D.;  Location: SURGERY SAME DAY [facility];  Service:    • GASTROSCOPY N/A 3/27/2017    Procedure: GASTROSCOPY;  Surgeon: Osmna Matt M.D.;  Location: SURGERY SAME DAY [facility];  Service:    • BREAST BIOPSY Left 2/21/2017    Procedure: BREAST BIOPSY - WIRE LOCALIZED EXCISONAL ;  Surgeon: Jane Zhao M.D.;  Location: SURGERY SAME DAY [facility];  Service:    • LUNG BIOPSY OPEN  11/2016   • OTHER ABDOMINAL SURGERY      Gasric Sleeve   • BONE MARROW ASPIRATION  3/16/2015    Performed by Freddie Hi M.D. at ENDOSCOPY [facility]   • BONE MARROW BIOPSY, NDL/TROCAR  3/16/2015    Performed by Freddie Hi M.D. at ENDOSCOPY [facility]   • RECOVERY  3/31/2014    Performed by [facility] at [facility]   • RECOVERY  3/24/2014    Performed by Cath[facility]Rec[facility] at [facility]   • RECOVERY  1/21/2014    Performed by Ir-Recovery Surgery at  SURGERY SAME DAY [facility]   • BRONCHOSCOPY-ENDO  11/15/2013    Performed by Ha Perez M.D. at ENDOSCOPY [facility]   • RECOVERY  2/27/2013    Performed by -Recovery Surgery at SURGERY SAME DAY [facility]   • BONE MARROW ASPIRATION  12/31/2012    Performed by Josemanuel Real M.D. at ENDOSCOPY [facility]   • BONE MARROW BIOPSY, NDL/TROCAR  12/31/2012    Performed by Josemanuel Real M.D. at ENDOSCOPY [facility]   • GASTROSCOPY  9/28/2012    Performed by Aravind Richards M.D. at SURGERY [facility]   • SIGMOIDOSCOPY FLEX  9/26/2012    Performed by Kristopher Cardoso M.D. at ENDOSCOPY [facility]   • BRONCHOSCOPY-ENDO  6/19/2012    Performed by MARLENA MURILLO at ENDOSCOPY [facility]   • BRONCHOSCOPY-ENDO  5/29/2012    Performed by SUYAPA CAMARENA at ENDOSCOPY [facility]   • OTHER ABDOMINAL SURGERY  December 2011    [place] [facility] [place]   • GASTROSCOPY-ENDO  3/12/2010    Performed by CAMELIA SAMANO at ENDOSCOPY [facility]   • EXAM UNDER ANESTHESIA  11/11/2009    Performed by BIRD ABDI at SURGERY [facility]   • HEMORRHOIDECTOMY  11/11/2009    Performed by BIRD ABDI at SURGERY [facility]   • KELBY BY LAPAROSCOPY  9/19/2009    Performed by BIRD ABDI at SURGERY [facility]   • CARPAL TUNNEL RELEASE          • KELBY BY LAPAROSCOPY     • GASTRIC BYPASS LAPAROSCOPIC     • HERNIA REPAIR      x3   • HYSTERECTOMY, TOTAL ABDOMINAL          • OTHER      Breast reduction   • OTHER      liver transplant   • PB ANESTH,NOSE,SINUS SURGERY      x4   • TONSILLECTOMY       Family History   Problem Relation Age of Onset   • Other Father         Unknown (dead 10 years)   • Diabetes Father    • Heart Disease Father    • Hypertension Father    • Hyperlipidemia Father    • Stroke Father    • Non-contributory Mother    • Hyperlipidemia Mother    • Alcohol/Drug Mother    • Cancer Paternal Aunt    • Alcohol/Drug Maternal Grandmother    •  Alcohol/Drug Maternal Grandfather      Social History     Social History   • Marital status:      Spouse name: N/A   • Number of children: N/A   • Years of education: N/A     Occupational History   • Not on file.     Social History Main Topics   • Smoking status: Never Smoker   • Smokeless tobacco: Never Used      Comment: avoid all tobacco products   • Alcohol use No      Comment: 05/2009 quit drinking   • Drug use: No   • Sexual activity: Not on file     Other Topics Concern   • Not on file     Social History Narrative   • No narrative on file     Allergies   Allergen Reactions   • Rhubarb Anaphylaxis   • Organic Nitrates      Nitroglycerin products should be avoided with the use of PDE-5 inhibitors as the combination can result in severe hypotension.  RD clarified with pt: Nitrates in food are okay and pt does not want nitrates to be listed as food allergy. Pt only avoids medications with nitrates.    • Vancomycin Hcl      Causes red man syndrome when infused to fast     • Acetaminophen      Can not take, hx of liver transplant    • Nsaids      Can not take due to hx of liver transplant    • Other Drug      Any medication that may interact with cyclosporine, tacrolimus, sirolimus, or prograf (due to hx of liver transplant)      Outpatient Encounter Prescriptions as of 3/4/2019   Medication Sig Dispense Refill   • aspirin 81 MG EC tablet TAKE 1 TAB BY MOUTH EVERY DAY. 90 Tab 4   • levothyroxine (SYNTHROID) 137 MCG Tab TAKE 1 TAB BY MOUTH EVERY MORNING ON AN EMPTY STOMACH. 90 Tab 1   • Linaclotide (LINZESS) 290 MCG Cap Take 1 Cap by mouth every day. 90 Cap 3   • omeprazole (PRILOSEC) 40 MG delayed-release capsule Take 1 Cap by mouth every day. 90 Cap 3   • traZODone (DESYREL) 50 MG Tab Take 2 Tabs by mouth every bedtime. 180 Tab 3   • lactulose 10 GM/15ML Solution TAKE 30ML BY MOUTH TWICE DAILY 1800 mL 3   • sertraline (ZOLOFT) 100 MG Tab Take 1 Tab by mouth every bedtime. 90 Tab 3   • FREESTYLE LITE strip  USE TO TEST EVERY DAY AS NEEDED SSX HIGH OR LOW,  Strip 3   • calcium citrate 315 mg-vitamin D 200 mg (CITRACAL+D) 315-200 MG-UNIT per tablet Take 1 Tab by mouth every day. 90 Tab 3   • ALPRAZolam (XANAX) 1 MG Tab Take 1 Tab by mouth 3 times a day as needed for Sleep for up to 30 days. 90 Tab 2   • [employer] GENTLE LAXATIVE 10 MG Suppos INSERT 1 SUPPOSITORY IN RECTUM 1 TIME DAILY AS NEEDED (CONSTIPATION). 30 Suppository 3   • gabapentin (NEURONTIN) 600 MG tablet TAKE 1 TABLET BY MOUTH THREE TIMES A DAY 90 Tab 1   • ambrisentan (LETAIRIS) 10 MG tablet Take 1 Tab by mouth every day. 90 Tab 3   • fluticasone (FLONASE) 50 MCG/ACT nasal spray SPRAY 1 SPRAY IN EACH NOSTRIL TWICE A DAY 48 g 3   • furosemide (LASIX) 40 MG Tab Take 1 Tab by mouth every morning. 90 Tab 2   • SPIRIVA HANDIHALER 18 MCG Cap INHALE 1 CAPSULE VIA HANDIHALER ONCE DAILY AT THE SAME TIME EVERY DAY 30 Cap 6   • Tadalafil, PAH, (ADCIRCA) 20 MG Tab Take 20 mg by mouth every bedtime. 90 Tab 3   • sennosides (SENOKOT) 8.6 MG Tab Take 17.2 mg by mouth every 12 hours.     • calcium polycarbophil (FIBERCON) 625 MG Tab Take 625 mg by mouth every morning.     • midodrine (PROAMATINE) 5 MG Tab Take 5-15 mg by mouth 1 time daily as needed.     • bisacodyl ([employer] GENTLE LAXATIVE) 10 MG Suppos Insert 20 mg in rectum every morning.     • ferrous sulfate (FEOSOL) 220 (44 Fe) MG/5ML Elixir Take 220 mg by mouth every evening.     • ondansetron (ZOFRAN ODT) 4 MG TABLET DISPERSIBLE Take 4 mg by mouth every 8 hours as needed for Nausea.     • predniSONE (DELTASONE) 5 MG Tab Take 5 mg by mouth every morning. Pt also has an RX for 2MG, pt takes total of 7MG QDAY      • tacrolimus (PROGRAF) 1 MG Cap Take 1 mg by mouth every 12 hours. Pt also has an RX for 0.5MG, pt takes total of 1.5MG BID      • Probiotic Product (PROBIOTIC DAILY PO) Take 1 Cap by mouth every evening.     • pravastatin (PRAVACHOL) 20 MG Tab Take 1 Tab by mouth every day. 90 Tab 2   • docusate sodium  "(COLACE) 100 MG Cap Take 100 mg by mouth See Admin Instructions. Three times a day at 0700, 1200, and 1800     • predniSONE (DELTASONE) 1 MG Tab Take 2 mg by mouth every morning. Pt also has an RX for 5MG, pt takes total of 7MG QDAY     • NON SPECIFIED Take 1 Cap by mouth every bedtime. Bariatric Dietary Supplment      • tacrolimus (PROGRAF) 0.5 MG Cap Take 0.5 mg by mouth every 12 hours. Pt also has an RX for 1MG, pt takes total of 1.5MG BID     • ascorbic acid (ASCORBIC ACID) 500 MG Tab Take 500 mg by mouth every morning.     • mycophenolate (CELLCEPT) 250 MG Cap Take 500 mg by mouth every 12 hours.       No facility-administered encounter medications on file as of 3/4/2019.      Review of Systems   Constitutional: Negative.  Negative for chills, fever and malaise/fatigue.   HENT: Negative.  Negative for sore throat.    Eyes: Negative.    Respiratory: Negative.  Negative for cough, hemoptysis, sputum production, shortness of breath, wheezing and stridor.    Cardiovascular: Negative.  Negative for chest pain, palpitations, orthopnea, claudication, leg swelling and PND.   Gastrointestinal: Negative.    Genitourinary: Negative.    Musculoskeletal: Negative.    Skin: Negative.    Neurological: Negative.  Negative for dizziness, loss of consciousness and weakness.   Endo/Heme/Allergies: Negative.  Does not bruise/bleed easily.   All other systems reviewed and are negative.       Objective:   BP (!) 64/40 (BP Location: Left arm, Patient Position: Sitting, BP Cuff Size: Adult)   Pulse 78   Ht 1.727 m (5' 8\")   Wt 62.1 kg (137 lb)   LMP 01/03/2000   SpO2 97%   BMI 20.83 kg/m²      Physical Exam   Constitutional: She appears well-developed and well-nourished. No distress.   HENT:   Head: Normocephalic and atraumatic.   Right Ear: External ear normal.   Left Ear: External ear normal.   Nose: Nose normal.   Mouth/Throat: No oropharyngeal exudate.   Eyes: Pupils are equal, round, and reactive to light. Conjunctivae and " EOM are normal. Right eye exhibits no discharge. Left eye exhibits no discharge. No scleral icterus.   Neck: Neck supple. No JVD present.   Cardiovascular: Normal rate, regular rhythm and intact distal pulses.  Exam reveals no gallop and no friction rub.    No murmur heard.  Pulmonary/Chest: Effort normal. No stridor. No respiratory distress. She has no wheezes. She has no rales. She exhibits no tenderness.   Abdominal: Soft. She exhibits no distension. There is no guarding.   Musculoskeletal: Normal range of motion. She exhibits no edema, tenderness or deformity.   Neurological: She is alert. She has normal reflexes. She displays normal reflexes. No cranial nerve deficit. She exhibits normal muscle tone. Coordination normal.   Skin: Skin is warm and dry. No rash noted. She is not diaphoretic. No erythema. No pallor.   Psychiatric: She has a normal mood and affect. Her behavior is normal. Judgment and thought content normal.   Nursing note and vitals reviewed.      Assessment:     1. Acquired hypothyroidism     2. 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HCC)     3. Chronic pain syndrome     4. Chronic prescription benzodiazepine use     5. Chronic respiratory failure with hypoxia (HCC)     6. CKD (chronic kidney disease) stage 3, GFR 30-59 ml/min- unclear cause, probably multifactorial     7. DUC (generalized anxiety disorder)     8. H/O non-ST elevation myocardial infarction (NSTEMI)     9. Hepatopulmonary syndrome (HCC)     10. High risk medication use     11. History of infection with vancomycin resistant Enterococcus (VRE)     12. History of pancreatitis     13. Immunocompromised state (HCC)     14. Interstitial lung disease (HCC)     15. Mild aortic regurgitation and aortic valve sclerosis     16. Ostium secundum type atrial septal defect, S/P percutaneous closure     17. Primary pulmonary hypertension (HCC)     18. Pure hypercholesterolemia         Medical Decision Making:  Today's Assessment / Status / Plan:     59-year-old  female with a liver transplant on immunosuppressive medications with pulmonary hypertension and pulmonary hypertension medications.  At this point given her low blood pressure and her concern for sepsis I would like her evaluated in the emergency room.  I am concerned about worsening renal function in the setting of low blood pressure.  I do not think I can safely manage this is an outpatient.  She is been advised to go to the emergency room as quickly as possible.    Thank for you allowing me to take part in your patient's care, please call should you have any questions or would like to discuss this patient.      Elisa Phillip M.D.  [address] [address] [address]  VIA In Basket

## 2019-03-05 PROBLEM — R10.9 AP (ABDOMINAL PAIN): Status: ACTIVE | Noted: 2019-03-05

## 2019-03-05 PROBLEM — R65.10 SIRS (SYSTEMIC INFLAMMATORY RESPONSE SYNDROME) (HCC): Status: ACTIVE | Noted: 2019-03-05

## 2019-03-05 PROBLEM — D61.818 PANCYTOPENIA (HCC): Status: ACTIVE | Noted: 2019-03-05

## 2019-03-05 PROBLEM — E27.40 ADRENAL INSUFFICIENCY (HCC): Status: ACTIVE | Noted: 2019-03-05

## 2019-03-05 PROBLEM — R00.1 BRADYCARDIA: Status: ACTIVE | Noted: 2019-03-05

## 2019-03-05 LAB
ALBUMIN SERPL BCP-MCNC: 3 G/DL (ref 3.2–4.9)
ALBUMIN/GLOB SERPL: 2.3 G/DL
ALP SERPL-CCNC: 33 U/L (ref 30–99)
ALT SERPL-CCNC: 34 U/L (ref 2–50)
ANION GAP SERPL CALC-SCNC: 5 MMOL/L (ref 0–11.9)
APPEARANCE UR: CLEAR
AST SERPL-CCNC: 43 U/L (ref 12–45)
BACTERIA #/AREA URNS HPF: NEGATIVE /HPF
BASOPHILS # BLD AUTO: 0.7 % (ref 0–1.8)
BASOPHILS # BLD: 0.02 K/UL (ref 0–0.12)
BILIRUB SERPL-MCNC: 0.3 MG/DL (ref 0.1–1.5)
BILIRUB UR QL STRIP.AUTO: NEGATIVE
BUN SERPL-MCNC: 14 MG/DL (ref 8–22)
CALCIUM SERPL-MCNC: 8.4 MG/DL (ref 8.5–10.5)
CHLORIDE SERPL-SCNC: 109 MMOL/L (ref 96–112)
CO2 SERPL-SCNC: 30 MMOL/L (ref 20–33)
COLOR UR: YELLOW
CORTIS SERPL-MCNC: 0.9 UG/DL (ref 0–23)
CREAT SERPL-MCNC: 1.01 MG/DL (ref 0.5–1.4)
EOSINOPHIL # BLD AUTO: 0.12 K/UL (ref 0–0.51)
EOSINOPHIL NFR BLD: 4.4 % (ref 0–6.9)
EPI CELLS #/AREA URNS HPF: NORMAL /HPF
ERYTHROCYTE [DISTWIDTH] IN BLOOD BY AUTOMATED COUNT: 50.6 FL (ref 35.9–50)
GLOBULIN SER CALC-MCNC: 1.3 G/DL (ref 1.9–3.5)
GLUCOSE SERPL-MCNC: 89 MG/DL (ref 65–99)
GLUCOSE UR STRIP.AUTO-MCNC: NEGATIVE MG/DL
HCT VFR BLD AUTO: 32.1 % (ref 37–47)
HGB BLD-MCNC: 10.7 G/DL (ref 12–16)
IMM GRANULOCYTES # BLD AUTO: 0.01 K/UL (ref 0–0.11)
IMM GRANULOCYTES NFR BLD AUTO: 0.4 % (ref 0–0.9)
KETONES UR STRIP.AUTO-MCNC: NEGATIVE MG/DL
LEUKOCYTE ESTERASE UR QL STRIP.AUTO: ABNORMAL
LYMPHOCYTES # BLD AUTO: 0.86 K/UL (ref 1–4.8)
LYMPHOCYTES NFR BLD: 31.7 % (ref 22–41)
MCH RBC QN AUTO: 32.3 PG (ref 27–33)
MCHC RBC AUTO-ENTMCNC: 33.3 G/DL (ref 33.6–35)
MCV RBC AUTO: 97 FL (ref 81.4–97.8)
MICRO URNS: ABNORMAL
MONOCYTES # BLD AUTO: 0.28 K/UL (ref 0–0.85)
MONOCYTES NFR BLD AUTO: 10.3 % (ref 0–13.4)
MORPHOLOGY BLD-IMP: NORMAL
NEUTROPHILS # BLD AUTO: 1.42 K/UL (ref 2–7.15)
NEUTROPHILS NFR BLD: 52.5 % (ref 44–72)
NITRITE UR QL STRIP.AUTO: NEGATIVE
NRBC # BLD AUTO: 0 K/UL
NRBC BLD-RTO: 0 /100 WBC
PH UR STRIP.AUTO: 6 [PH]
PLATELET # BLD AUTO: 65 K/UL (ref 164–446)
PLATELETS.RETICULATED NFR BLD AUTO: 1.1 K/UL (ref 0.6–13.1)
PMV BLD AUTO: 9.3 FL (ref 9–12.9)
POTASSIUM SERPL-SCNC: 3.6 MMOL/L (ref 3.6–5.5)
PROCALCITONIN SERPL-MCNC: <0.05 NG/ML
PROT SERPL-MCNC: 4.3 G/DL (ref 6–8.2)
PROT UR QL STRIP: NEGATIVE MG/DL
RBC # BLD AUTO: 3.31 M/UL (ref 4.2–5.4)
RBC # URNS HPF: NORMAL /HPF
RBC UR QL AUTO: NEGATIVE
SODIUM SERPL-SCNC: 144 MMOL/L (ref 135–145)
SP GR UR STRIP.AUTO: 1.03
UROBILINOGEN UR STRIP.AUTO-MCNC: 0.2 MG/DL
WBC # BLD AUTO: 2.7 K/UL (ref 4.8–10.8)
WBC #/AREA URNS HPF: NORMAL /HPF

## 2019-03-05 PROCEDURE — A9270 NON-COVERED ITEM OR SERVICE: HCPCS | Performed by: EMERGENCY MEDICINE

## 2019-03-05 PROCEDURE — 700111 HCHG RX REV CODE 636 W/ 250 OVERRIDE (IP): Performed by: HOSPITALIST

## 2019-03-05 PROCEDURE — 81001 URINALYSIS AUTO W/SCOPE: CPT

## 2019-03-05 PROCEDURE — A9270 NON-COVERED ITEM OR SERVICE: HCPCS | Performed by: HOSPITALIST

## 2019-03-05 PROCEDURE — 700102 HCHG RX REV CODE 250 W/ 637 OVERRIDE(OP): Performed by: EMERGENCY MEDICINE

## 2019-03-05 PROCEDURE — 99232 SBSQ HOSP IP/OBS MODERATE 35: CPT | Performed by: HOSPITALIST

## 2019-03-05 PROCEDURE — 84145 PROCALCITONIN (PCT): CPT

## 2019-03-05 PROCEDURE — 36415 COLL VENOUS BLD VENIPUNCTURE: CPT

## 2019-03-05 PROCEDURE — 82533 TOTAL CORTISOL: CPT

## 2019-03-05 PROCEDURE — 85025 COMPLETE CBC W/AUTO DIFF WBC: CPT

## 2019-03-05 PROCEDURE — 80053 COMPREHEN METABOLIC PANEL: CPT

## 2019-03-05 PROCEDURE — 770020 HCHG ROOM/CARE - TELE (206)

## 2019-03-05 PROCEDURE — 700105 HCHG RX REV CODE 258: Performed by: HOSPITALIST

## 2019-03-05 PROCEDURE — 85055 RETICULATED PLATELET ASSAY: CPT

## 2019-03-05 PROCEDURE — 700102 HCHG RX REV CODE 250 W/ 637 OVERRIDE(OP): Performed by: HOSPITALIST

## 2019-03-05 RX ORDER — POTASSIUM CHLORIDE 20 MEQ/1
40 TABLET, EXTENDED RELEASE ORAL ONCE
Status: COMPLETED | OUTPATIENT
Start: 2019-03-05 | End: 2019-03-05

## 2019-03-05 RX ORDER — AMBRISENTAN 10 MG/1
10 TABLET, FILM COATED ORAL DAILY
Status: DISCONTINUED | OUTPATIENT
Start: 2019-03-05 | End: 2019-03-06 | Stop reason: HOSPADM

## 2019-03-05 RX ORDER — FLUDROCORTISONE ACETATE 0.1 MG/1
0.1 TABLET ORAL EVERY MORNING
Status: DISCONTINUED | OUTPATIENT
Start: 2019-03-05 | End: 2019-03-06 | Stop reason: HOSPADM

## 2019-03-05 RX ORDER — TADALAFIL 20 MG/1
20 TABLET ORAL
Status: DISCONTINUED | OUTPATIENT
Start: 2019-03-05 | End: 2019-03-06 | Stop reason: HOSPADM

## 2019-03-05 RX ORDER — MORPHINE SULFATE 4 MG/ML
2-4 INJECTION, SOLUTION INTRAMUSCULAR; INTRAVENOUS EVERY 4 HOURS PRN
Status: DISCONTINUED | OUTPATIENT
Start: 2019-03-05 | End: 2019-03-06 | Stop reason: HOSPADM

## 2019-03-05 RX ORDER — SODIUM CHLORIDE 9 MG/ML
500 INJECTION, SOLUTION INTRAVENOUS
Status: DISCONTINUED | OUTPATIENT
Start: 2019-03-05 | End: 2019-03-06 | Stop reason: HOSPADM

## 2019-03-05 RX ORDER — SODIUM CHLORIDE 9 MG/ML
30 INJECTION, SOLUTION INTRAVENOUS
Status: DISCONTINUED | OUTPATIENT
Start: 2019-03-05 | End: 2019-03-06 | Stop reason: HOSPADM

## 2019-03-05 RX ORDER — SODIUM CHLORIDE 9 MG/ML
1000 INJECTION, SOLUTION INTRAVENOUS ONCE
Status: COMPLETED | OUTPATIENT
Start: 2019-03-05 | End: 2019-03-05

## 2019-03-05 RX ADMIN — SERTRALINE HYDROCHLORIDE 100 MG: 100 TABLET ORAL at 22:05

## 2019-03-05 RX ADMIN — ALPRAZOLAM 1 MG: 1 TABLET ORAL at 05:50

## 2019-03-05 RX ADMIN — LEVOTHYROXINE SODIUM 137 MCG: 137 TABLET ORAL at 05:49

## 2019-03-05 RX ADMIN — AMBRISENTAN 10 MG: 10 TABLET, FILM COATED ORAL at 12:15

## 2019-03-05 RX ADMIN — HYDROCORTISONE SODIUM SUCCINATE 50 MG: 100 INJECTION, POWDER, FOR SOLUTION INTRAMUSCULAR; INTRAVENOUS at 15:55

## 2019-03-05 RX ADMIN — MYCOPHENOLATE MOFETIL 500 MG: 250 CAPSULE ORAL at 20:00

## 2019-03-05 RX ADMIN — MORPHINE SULFATE 4 MG: 4 INJECTION INTRAVENOUS at 20:37

## 2019-03-05 RX ADMIN — ALPRAZOLAM 1 MG: 1 TABLET ORAL at 11:44

## 2019-03-05 RX ADMIN — HYDROMORPHONE HYDROCHLORIDE 0.5 MG: 1 INJECTION, SOLUTION INTRAMUSCULAR; INTRAVENOUS; SUBCUTANEOUS at 06:03

## 2019-03-05 RX ADMIN — GABAPENTIN 600 MG: 300 CAPSULE ORAL at 17:28

## 2019-03-05 RX ADMIN — SODIUM CHLORIDE: 9 INJECTION, SOLUTION INTRAVENOUS at 05:50

## 2019-03-05 RX ADMIN — OMEPRAZOLE 40 MG: 20 CAPSULE, DELAYED RELEASE ORAL at 05:49

## 2019-03-05 RX ADMIN — HYDROCORTISONE SODIUM SUCCINATE 100 MG: 100 INJECTION, POWDER, FOR SOLUTION INTRAMUSCULAR; INTRAVENOUS at 03:39

## 2019-03-05 RX ADMIN — GABAPENTIN 600 MG: 300 CAPSULE ORAL at 11:44

## 2019-03-05 RX ADMIN — PRAVASTATIN SODIUM 20 MG: 20 TABLET ORAL at 05:50

## 2019-03-05 RX ADMIN — TADALAFIL 20 MG: 20 TABLET ORAL at 21:00

## 2019-03-05 RX ADMIN — MORPHINE SULFATE 4 MG: 4 INJECTION INTRAVENOUS at 12:18

## 2019-03-05 RX ADMIN — SODIUM CHLORIDE: 9 INJECTION, SOLUTION INTRAVENOUS at 00:11

## 2019-03-05 RX ADMIN — POTASSIUM CHLORIDE 40 MEQ: 1500 TABLET, EXTENDED RELEASE ORAL at 11:45

## 2019-03-05 RX ADMIN — MORPHINE SULFATE 4 MG: 4 INJECTION INTRAVENOUS at 16:30

## 2019-03-05 RX ADMIN — SENNOSIDES AND DOCUSATE SODIUM 2 TABLET: 8.6; 5 TABLET ORAL at 17:28

## 2019-03-05 RX ADMIN — MORPHINE SULFATE 4 MG: 4 INJECTION INTRAVENOUS at 08:14

## 2019-03-05 RX ADMIN — BISACODYL 10 MG: 10 SUPPOSITORY RECTAL at 15:33

## 2019-03-05 RX ADMIN — ALPRAZOLAM 1 MG: 1 TABLET ORAL at 17:28

## 2019-03-05 RX ADMIN — SODIUM CHLORIDE 1000 ML: 9 INJECTION, SOLUTION INTRAVENOUS at 02:00

## 2019-03-05 RX ADMIN — ASPIRIN 81 MG: 81 TABLET, COATED ORAL at 05:50

## 2019-03-05 RX ADMIN — TRAZODONE HYDROCHLORIDE 100 MG: 50 TABLET ORAL at 22:06

## 2019-03-05 RX ADMIN — FLUDROCORTISONE ACETATE 0.1 MG: 0.1 TABLET ORAL at 15:55

## 2019-03-05 RX ADMIN — TACROLIMUS 1.5 MG: 0.5 CAPSULE ORAL at 20:00

## 2019-03-05 RX ADMIN — HYDROCORTISONE SODIUM SUCCINATE 50 MG: 100 INJECTION, POWDER, FOR SOLUTION INTRAMUSCULAR; INTRAVENOUS at 22:06

## 2019-03-05 RX ADMIN — GABAPENTIN 600 MG: 300 CAPSULE ORAL at 05:50

## 2019-03-05 RX ADMIN — TACROLIMUS 1.5 MG: 0.5 CAPSULE ORAL at 08:59

## 2019-03-05 RX ADMIN — MYCOPHENOLATE MOFETIL 500 MG: 250 CAPSULE ORAL at 08:58

## 2019-03-05 ASSESSMENT — COGNITIVE AND FUNCTIONAL STATUS - GENERAL
MOBILITY SCORE: 24
DAILY ACTIVITIY SCORE: 24
SUGGESTED CMS G CODE MODIFIER MOBILITY: CH
SUGGESTED CMS G CODE MODIFIER DAILY ACTIVITY: CH

## 2019-03-05 ASSESSMENT — ENCOUNTER SYMPTOMS
DIZZINESS: 0
EYE REDNESS: 0
DEPRESSION: 0
FEVER: 0
SENSORY CHANGE: 0
HALLUCINATIONS: 0
DOUBLE VISION: 0
COUGH: 0
BLURRED VISION: 0
MYALGIAS: 0
HEARTBURN: 0
SPEECH CHANGE: 0
FOCAL WEAKNESS: 0
FLANK PAIN: 0
ABDOMINAL PAIN: 1
WEAKNESS: 0
EYE DISCHARGE: 0
DIARRHEA: 1
VOMITING: 0
SORE THROAT: 0
DIZZINESS: 1
HEMOPTYSIS: 0
SHORTNESS OF BREATH: 0
NAUSEA: 0
BLOOD IN STOOL: 0
BRUISES/BLEEDS EASILY: 0
CHILLS: 0
PALPITATIONS: 0

## 2019-03-05 ASSESSMENT — LIFESTYLE VARIABLES: SUBSTANCE_ABUSE: 0

## 2019-03-05 NOTE — H&P
Hospital Medicine History & Physical Note    Date of Service  3/4/2019    Primary Care Physician  Elisa Phillip M.D.    Consultants  none    Code Status  full    Chief Complaint  abd pain     History of Presenting Illness  59 y.o. female who presented 3/4/2019 with complicated medical history on chronic immunosuppression status post liver transplant chronic pain syndrome chronic constipation sarcoidosis history of status post gastric bypass who presents with abdominal pain.  This patient had left upper quadrant abdominal pain radiating to the back.  Associated fever body aches neck pain.  She states that she has an enlarged spleen and this is a known medical conditions for her.  She was noticed to have low blood pressures at home.  She has had previous episodes of sepsis and thinks that she may be septic again.  She does have some diarrhea.  And a previous history of Clostridium difficile.  She has no known alleviating or exacerbating factors to her symptoms otherwise.    Review of Systems  Review of Systems   Constitutional: Negative for chills and fever.   HENT: Negative for congestion, hearing loss and tinnitus.    Eyes: Negative for blurred vision, double vision and discharge.   Respiratory: Negative for cough, hemoptysis and shortness of breath.    Cardiovascular: Negative for chest pain, palpitations and leg swelling.   Gastrointestinal: Positive for abdominal pain and diarrhea. Negative for heartburn, nausea and vomiting.   Genitourinary: Negative for dysuria and flank pain.   Musculoskeletal: Negative for joint pain and myalgias.   Skin: Negative for rash.   Neurological: Negative for dizziness, sensory change, speech change, focal weakness and weakness.   Endo/Heme/Allergies: Negative for environmental allergies. Does not bruise/bleed easily.   Psychiatric/Behavioral: Negative for depression, hallucinations and substance abuse.       Past Medical History   has a past medical history of Anemia; Anesthesia;  Breath shortness; Bronchitis ( ); Cardiomegaly; Chronic fatigue and malaise; Cirrhosis (HCC) (December 2011); CKD (chronic kidney disease) stage 3, GFR 30-59 ml/min (HCC); Diabetes (HCC); Fracture of left foot; GERD (gastroesophageal reflux disease); H/O Clostridium difficile infection (02-17-17); Hemorrhagic disorder (HCC); Hiatus hernia syndrome; High cholesterol; Hypothyroid; Jaundice (2009); Liver transplanted ([facility]); Low back pain (02-17-17); Mild aortic regurgitation and aortic valve sclerosis ( ); On home oxygen therapy; Platelet disorder (HCC); Pneumonia; Psychiatric disorder; Pulmonary hypertension (HCC); S/P cholecystectomy; Small bowel obstruction (HCC); Splenomegaly; and VRE (vancomycin-resistant Enterococci).    Surgical History   has a past surgical history that includes pr anesth,nose,sinus surgery; makayla by laparoscopy (9/19/2009); exam under anesthesia (11/11/2009); hysterectomy, total abdominal; gastroscopy-endo (3/12/2010); bronchoscopy-endo (5/29/2012); bronchoscopy-endo (6/19/2012); sigmoidoscopy flex (9/26/2012); recovery (2/27/2013); bronchoscopy-endo (11/15/2013); recovery (1/21/2014); recovery (3/24/2014); hemorrhoidectomy (11/11/2009); gastroscopy (9/28/2012); carpal tunnel release; bone marrow aspiration (12/31/2012); bone marrow biopsy, ndl/trocar (12/31/2012); recovery (3/31/2014); other abdominal surgery (December 2011); bone marrow aspiration (3/16/2015); bone marrow biopsy, ndl/trocar (3/16/2015); lung biopsy open (11/2016); tonsillectomy; other abdominal surgery (); breast biopsy (Left, 2/21/2017); colonoscopy (N/A, 3/27/2017); gastroscopy (N/A, 3/27/2017); gastric bypass laparoscopic; hernia repair; makayla by laparoscopy; other; other (12/11/2017); other; turbinate reduction (Bilateral, 10/4/2018); nasal reconstruction (Bilateral, 10/4/2018); and ventral hernia repair robotic xi (N/A, 11/12/2018).     Family History  family history includes Alcohol/Drug in her maternal  grandfather, maternal grandmother, and mother; Cancer in her paternal aunt; Diabetes in her father; Heart Disease in her father; Hyperlipidemia in her father and mother; Hypertension in her father; Non-contributory in her mother; Other in her father; Stroke in her father.     Social History   reports that she has never smoked. She has never used smokeless tobacco. She reports that she does not drink alcohol or use drugs.    Allergies  Allergies   Allergen Reactions   • Rhubarb Anaphylaxis   • Organic Nitrates      Nitroglycerin products should be avoided with the use of PDE-5 inhibitors as the combination can result in severe hypotension.  RD clarified with pt: Nitrates in food are okay and pt does not want nitrates to be listed as food allergy. Pt only avoids medications with nitrates.    • Vancomycin Hcl      Causes red man syndrome when infused to fast     • Acetaminophen      Can not take, hx of liver transplant    • Nsaids      Can not take due to hx of liver transplant    • Other Drug      Any medication that may interact with cyclosporine, tacrolimus, sirolimus, or prograf (due to hx of liver transplant)        Medications  Prior to Admission Medications   Prescriptions Last Dose Informant Patient Reported? Taking?   ALPRAZolam (XANAX) 1 MG Tab 3/4/2019 at 0900 Patient Yes Yes   Sig: Take 1 mg by mouth 3 times a day.   [employer] GENTLE LAXATIVE 10 MG Suppos 3/4/2019 at 0900 Patient No No   Sig: INSERT 1 SUPPOSITORY IN RECTUM 1 TIME DAILY AS NEEDED (CONSTIPATION).   Linaclotide (LINZESS) 290 MCG Cap 3/4/2019 at 0900 Patient No No   Sig: Take 1 Cap by mouth every day.   NON SPECIFIED 3/3/2019 at 2200 Patient Yes No   Sig: Take 1 Cap by mouth every bedtime. Bariatric Dietary Supplment    Probiotic Product (PROBIOTIC DAILY PO) 3/3/2019 at 1900 Patient Yes No   Sig: Take 1 Cap by mouth every evening.   SPIRIVA HANDIHALER 18 MCG Cap 3/4/2019 at 0930 Patient No No   Sig: INHALE 1 CAPSULE VIA HANDIHALER ONCE DAILY AT THE  SAME TIME EVERY DAY   Tadalafil, PAH, (ADCIRCA) 20 MG Tab 3/3/2019 at 2200 Patient No No   Sig: Take 20 mg by mouth every bedtime.   ambrisentan (LETAIRIS) 10 MG tablet 3/4/2019 at 0900 Patient No No   Sig: Take 1 Tab by mouth every day.   ascorbic acid (ASCORBIC ACID) 500 MG Tab 3/4/2019 at 0900 Patient Yes No   Sig: Take 500 mg by mouth every morning.   aspirin 81 MG EC tablet 3/4/2019 at 0900 Patient No No   Sig: TAKE 1 TAB BY MOUTH EVERY DAY.   calcium citrate 315 mg-vitamin D 200 mg (CITRACAL+D) 315-200 MG-UNIT per tablet 3/4/2019 at 0900 Patient No No   Sig: Take 1 Tab by mouth every day.   calcium polycarbophil (FIBERCON) 625 MG Tab 3/4/2019 at 0900 Patient Yes No   Sig: Take 625 mg by mouth every morning.   docusate sodium (COLACE) 100 MG Cap 3/4/2019 at 0700 Patient Yes No   Sig: Take 100 mg by mouth See Admin Instructions. Three times a day at 0700, 1200, and 1800   fluticasone (FLONASE) 50 MCG/ACT nasal spray 3/4/2019 at 0900 Patient No No   Sig: SPRAY 1 SPRAY IN EACH NOSTRIL TWICE A DAY   furosemide (LASIX) 40 MG Tab 3/4/2019 at 0900 Patient Yes Yes   Sig: Take 20 mg by mouth every day.   gabapentin (NEURONTIN) 600 MG tablet 3/4/2019 at 0900 Patient No No   Sig: TAKE 1 TABLET BY MOUTH THREE TIMES A DAY   lactulose 10 GM/15ML Solution 3/4/2019 at 0900 Patient No No   Sig: TAKE 30ML BY MOUTH TWICE DAILY   levothyroxine (SYNTHROID) 137 MCG Tab 3/4/2019 at 0700 Patient No No   Sig: TAKE 1 TAB BY MOUTH EVERY MORNING ON AN EMPTY STOMACH.   midodrine (PROAMATINE) 5 MG Tab 3/3/2019 at 1900 Patient Yes No   Sig: Take 5-15 mg by mouth 1 time daily as needed.   mycophenolate (CELLCEPT) 250 MG Cap 3/4/2019 at 0900 Patient Yes No   Sig: Take 500 mg by mouth every 12 hours.   omeprazole (PRILOSEC) 40 MG delayed-release capsule 3/4/2019 at 0900 Patient No No   Sig: Take 1 Cap by mouth every day.   ondansetron (ZOFRAN ODT) 4 MG TABLET DISPERSIBLE 3/4/2019 at 0700 Patient Yes No   Sig: Take 4 mg by mouth every 8 hours as  needed for Nausea.   pravastatin (PRAVACHOL) 20 MG Tab 3/4/2019 at 0900 Patient No No   Sig: Take 1 Tab by mouth every day.   predniSONE (DELTASONE) 1 MG Tab 3/4/2019 at 0700 Patient Yes No   Sig: Take 2 mg by mouth every morning. Pt also has an RX for 5MG, pt takes total of 7MG QDAY   predniSONE (DELTASONE) 5 MG Tab 3/4/2019 at 0700 Patient Yes No   Sig: Take 5 mg by mouth every morning. Pt also has an RX for 1 MG  pt takes total of 7MG QDAY   sennosides (SENOKOT) 8.6 MG Tab 3/4/2019 at 0900 Patient Yes No   Sig: Take 17.2 mg by mouth every 12 hours.   sertraline (ZOLOFT) 100 MG Tab 3/3/2019 at 2200 Patient No No   Sig: Take 1 Tab by mouth every bedtime.   tacrolimus (PROGRAF) 0.5 MG Cap 3/4/2019 at 0900 Patient Yes No   Sig: Take 0.5 mg by mouth every 12 hours. Pt also has an RX for 1MG, pt takes total of 1.5MG BID   tacrolimus (PROGRAF) 1 MG Cap 3/4/2019 at 0900 Patient Yes No   Sig: Take 1 mg by mouth every 12 hours. Pt also has an RX for 0.5MG, pt takes total of 1.5MG BID    traZODone (DESYREL) 50 MG Tab 3/3/2019 at 2200 Patient No No   Sig: Take 2 Tabs by mouth every bedtime.      Facility-Administered Medications: None       Physical Exam  Temp:  [36.7 °C (98 °F)] 36.7 °C (98 °F)  Pulse:  [57-67] 57  Resp:  [16-18] 18  BP: (123)/(63) 123/63  SpO2:  [93 %-99 %] 97 %    Physical Exam   Constitutional: She is oriented to person, place, and time. She appears well-developed and well-nourished. She appears distressed.   HENT:   Head: Normocephalic and atraumatic.   Dry oral mucosa   Eyes: Pupils are equal, round, and reactive to light. Conjunctivae and EOM are normal.   Neck: Normal range of motion. Neck supple. No JVD present.   Cardiovascular: Normal rate, regular rhythm, normal heart sounds and intact distal pulses.    No murmur heard.  Pulmonary/Chest: Effort normal and breath sounds normal. No respiratory distress. She has no wheezes.   Abdominal: Soft. Bowel sounds are normal. She exhibits no distension.  There is tenderness.   LUQ   Musculoskeletal: Normal range of motion. She exhibits no edema.   Neurological: She is alert and oriented to person, place, and time. She exhibits normal muscle tone.   Skin: Skin is warm and dry.   Psychiatric: She has a normal mood and affect. Her behavior is normal. Judgment and thought content normal.   Nursing note and vitals reviewed.      Laboratory:  Recent Labs      03/04/19   1659   WBC  3.5*   RBC  3.59*   HEMOGLOBIN  11.5*   HEMATOCRIT  35.0*   MCV  97.5   MCH  32.0   MCHC  32.9*   RDW  50.0   PLATELETCT  60*   MPV  8.4*     Recent Labs      03/04/19   1659   SODIUM  141   POTASSIUM  4.2   CHLORIDE  106   CO2  30   GLUCOSE  90   BUN  15   CREATININE  0.81   CALCIUM  9.7     Recent Labs      03/04/19   1659   ALTSGPT  34   ASTSGOT  33   ALKPHOSPHAT  39   TBILIRUBIN  0.3   GLUCOSE  90                 No results for input(s): TROPONINI in the last 72 hours.    Urinalysis:    Recent Labs      03/04/19   1907   SPECGRAVITY  1.042   GLUCOSEUR  Negative   KETONES  Negative   NITRITE  Negative   LEUKESTERAS  Moderate*   WBCURINE  2-5   RBCURINE  2-5*   BACTERIA  Negative   EPITHELCELL  Negative        Imaging:  CT-ABDOMEN-PELVIS WITH   Final Result      1.  There is no acute inflammatory process within the abdomen or pelvis.   2.  There are postoperative changes consistent with liver transplant with patent vascular structures and mildly dilated main portal vein and mild splenomegaly again seen.   3.  Incidental splenic cyst again seen.   4.  Surgically absent gallbladder without biliary dilatation.   5.  There is no bowel obstruction or acute bowel inflammation.      DX-CHEST-PORTABLE (1 VIEW)   Final Result      1.  There is no acute cardiopulmonary process.            Assessment/Plan:  I anticipate this patient will require at least two midnights for appropriate medical management, necessitating inpatient admission.    * AP (abdominal pain)   Assessment & Plan    Unclear etiology,  mild known splenomegaly   Cont pain control  Anti emetics   IVF   Monitor with serial abd exams  c diff for diarrhea     Adrenal insufficiency (HCC)   Assessment & Plan    With associated hypotension and severely low cortisol level almost undetectable   Continue with IV hydrocortisone        Hypotension- (present on admission)   Assessment & Plan    Asymptomatic, on midodrine at baseline  Cont with IVF and IV steroids given severely low cortisol level  Query sepsis given immunocompromised state  Hold home BP medications for now      Immunocompromised state (HCC)- (present on admission)   Assessment & Plan    Resume home medications      Chronic respiratory failure with hypoxia (HCC)- (present on admission)   Assessment & Plan    At baseline 02  resp care protocol      Bradycardia   Assessment & Plan    Mild cont to monitor      Pancytopenia (HCC)   Assessment & Plan    This is acute on chronic  Cont to monitor      SIRS (systemic inflammatory response syndrome) ([facility])   Assessment & Plan    Unclear source, possibly just adrenal insuf, however she is severly immunocompromised  Follow culutres  Trend lactic   procal ordered  Hold on abx no clear source     History of Clostridium difficile infection- (present on admission)   Assessment & Plan    Recheck c diff     Chronic prescription benzodiazepine use- (present on admission)   Assessment & Plan    Resume home benzo     Splenomegaly- (present on admission)   Assessment & Plan    Cont to monitor          VTE prophylaxis: heparin

## 2019-03-05 NOTE — ED PROVIDER NOTES
"ED Provider Note    Scribed for Praneeth Soliman D.O. by González Abad. 3/4/2019  4:26 PM    Primary care provider: Elisa Phillip M.D.  Means of arrival: Ambulace  History obtained from: Patient  History limited by: None    CHIEF COMPLAINT  Chief Complaint   Patient presents with   • Abdominal Pain   • Diarrhea   • Flank Pain     HPI  Roxana Cuba is a 59 y.o. female who presents to the Emergency Department complaining of diarrhea, left upper quadrant pain, and bilateral interior rib pain onset 1 week ago.  She has had associated intermittent fever, body aches, and neck pain.  Denies any cough.  The left upper quadrant pain is described as spleen pain.  She has spleen masses that have not been biopsied.  Her blood pressure this morning was 60/40 and last night it was 60/25.  The patient reports that she believes she has sepsis due to these findings and her symptoms.  She recently had dental work done and did not take her antibiotics that she is supposed to due to her liver transplant.  The patient has sarcoidosis, a recent episode of shingles, and a history of alcohol use that she discontinued after the liver transplant that was completed in 2011.  Patient utilizes many immunosuppressive medications due to her transplant.  She has been able to eat and drink today.  Patient states she is able to tolerate IV contrast.  The patient does have a hernia that she pushes in every morning.    REVIEW OF SYSTEMS  Pertinent positives include diarrhea, left upper quadrant pain, bilateral interior rib pain, body aches, neck pain, and fever. Pertinent negatives include no cough.  All other systems reviewed and negative.    PAST MEDICAL HISTORY  Past Medical History:   Diagnosis Date   • Anemia    • Anesthesia     \"takes more to get me under, would rather be intubated\"    • Breath shortness     cont oxygen 5L (Preffered)   • Bronchitis      2016   • Cardiomegaly    • Chronic fatigue and malaise    • Cirrhosis (HCC) " "December 2011    [place] [facility] [place] CKD (chronic kidney disease) stage 3, GFR 30-59 ml/min ([facility])    • Diabetes ([facility])     reports hx of, resolved w/weight loss. Reports still checking bloodsugars twice daily and using insulin PRN   • Fracture of left foot    • GERD (gastroesophageal reflux disease)         • H/O Clostridium difficile infection 02-17-17    reports \"16 months ago\". Current stool sample 01-26-17 neg   • Hemorrhagic disorder ([facility])     \"low platelets\" bruises easily    • Hiatus hernia syndrome    • High cholesterol    • Hypothyroid    • Jaundice 2009   • Liver transplanted ([facility])    • Low back pain 02-17-17    and left foot, 7/10   • Mild aortic regurgitation and aortic valve sclerosis     • On home oxygen therapy     5 liters, Dr. Gaming   • Platelet disorder ([facility])     low platelets   • Pneumonia     aspiration,    • Psychiatric disorder     Mood disorder   • Pulmonary hypertension ([facility])        • S/P cholecystectomy    • Small bowel obstruction ([facility])     01-   • Splenomegaly    • VRE (vancomycin-resistant Enterococci)     02-17-17, pt declines knowledge of this       SURGICAL HISTORY  Past Surgical History:   Procedure Laterality Date   • VENTRAL HERNIA REPAIR ROBOTIC XI N/A 11/12/2018    Procedure: VENTRAL HERNIA REPAIR ROBOTIC XI- FOR EPIGASTRIC INCISIONAL;  Surgeon: John H Ganser, M.D.;  Location: SURGERY [facility];  Service: General   • TURBINATE REDUCTION Bilateral 10/4/2018    Procedure: TURBINATE REDUCTION;  Surgeon: Silviano Erazo M.D.;  Location: SURGERY SAME DAY [facility];  Service: Ent   • NASAL RECONSTRUCTION Bilateral 10/4/2018    Procedure: NASAL RECONSTRUCTION- LATERA IMPLANTS;  Surgeon: Silviano Erazo M.D.;  Location: SURGERY SAME DAY [facility];  Service: Ent   • OTHER  12/11/2017    paniculectomy   • COLONOSCOPY N/A 3/27/2017    Procedure: COLONOSCOPY;  Surgeon: Osman Matt M.D.;  Location: SURGERY SAME DAY [facility];  Service:  "   • GASTROSCOPY N/A 3/27/2017    Procedure: GASTROSCOPY;  Surgeon: Osman Matt M.D.;  Location: SURGERY SAME DAY [facility];  Service:    • BREAST BIOPSY Left 2/21/2017    Procedure: BREAST BIOPSY - WIRE LOCALIZED EXCISONAL ;  Surgeon: Jane Zhao M.D.;  Location: SURGERY SAME DAY [facility];  Service:    • LUNG BIOPSY OPEN  11/2016   • OTHER ABDOMINAL SURGERY      Gasric Sleeve   • BONE MARROW ASPIRATION  3/16/2015    Performed by Marlena Hi M.D. at ENDOSCOPY [facility]   • BONE MARROW BIOPSY, NDL/TROCAR  3/16/2015    Performed by Marlena Hi M.D. at ENDOSCOPY [facility]   • RECOVERY  3/31/2014    Performed by [facility] Surgery at SURGERY [facility]   • RECOVERY  3/24/2014    Performed by [facility]Recovery Surgery at SURGERY SAME DAY [facility]   • RECOVERY  1/21/2014    Performed by [facility] at SURGERY SAME DAY [facility]   • BRONCHOSCOPY-ENDO  11/15/2013    Performed by Ha Perez M.D. at ENDOSCOPY [facility]   • RECOVERY  2/27/2013    Performed by [facility] at SURGERY SAME DAY [facility]   • BONE MARROW ASPIRATION  12/31/2012    Performed by Josemanuel Real M.D. at [facility]   • BONE MARROW BIOPSY, NDL/TROCAR  12/31/2012    Performed by Josemanuel Real M.D. at ENDOSCOPY [facility]   • GASTROSCOPY  9/28/2012    Performed by Aravind Richards M.D. at SURGERY [facility]   • SIGMOIDOSCOPY FLEX  9/26/2012    Performed by Kristopher Cardoso M.D. at [facility]   • BRONCHOSCOPY-ENDO  6/19/2012    Performed by MARLENA MURILLO at [facility]   • BRONCHOSCOPY-ENDO  5/29/2012    Performed by SUYAPA CAMARENA at [facility]   • OTHER ABDOMINAL SURGERY  December 2011    Liver transplant [place] [facility] [place]. [place].   • GASTROSCOPY-ENDO  3/12/2010    Performed by CAMELIA SAAMNO at [facility]   • EXAM UNDER ANESTHESIA  11/11/2009    Performed by BIRD ABDI  at SURGERY [facility]   • HEMORRHOIDECTOMY  11/11/2009    Performed by BIRD ABDI at SURGERY [facility]   • KELBY BY LAPAROSCOPY  9/19/2009    Performed by BIRD ABDI at SURGERY [facility]   • CARPAL TUNNEL RELEASE          • KELBY BY LAPAROSCOPY     • GASTRIC BYPASS LAPAROSCOPIC     • HERNIA REPAIR      x3   • HYSTERECTOMY, TOTAL ABDOMINAL          • OTHER      Breast reduction   • OTHER      liver transplant   • PB ANESTH,NOSE,SINUS SURGERY      x4   • TONSILLECTOMY        SOCIAL HISTORY  Social History   Substance Use Topics   • Smoking status: Never Smoker   • Smokeless tobacco: Never Used      Comment: avoid all tobacco products   • Alcohol use No      Comment: 05/2009 quit drinking      History   Drug Use No     FAMILY HISTORY  Family History   Problem Relation Age of Onset   • Other Father         Unknown (dead 10 years)   • Diabetes Father    • Heart Disease Father    • Hypertension Father    • Hyperlipidemia Father    • Stroke Father    • Non-contributory Mother    • Hyperlipidemia Mother    • Alcohol/Drug Mother    • Cancer Paternal Aunt    • Alcohol/Drug Maternal Grandmother    • Alcohol/Drug Maternal Grandfather      CURRENT MEDICATIONS  Home Medications     Reviewed by Colleen Schneider (Pharmacy Tech) on 03/04/19 at 1658  Med List Status: Complete   Medication Last Dose Status   ALPRAZolam (XANAX) 1 MG Tab 3/4/2019 Active   ambrisentan (LETAIRIS) 10 MG tablet 3/4/2019 Active   ascorbic acid (ASCORBIC ACID) 500 MG Tab 3/4/2019 Active   aspirin 81 MG EC tablet 3/4/2019 Active   calcium citrate 315 mg-vitamin D 200 mg (CITRACAL+D) 315-200 MG-UNIT per tablet 3/4/2019 Active   calcium polycarbophil (FIBERCON) 625 MG Tab 3/4/2019 Active   [employer] GENTLE LAXATIVE 10 MG Suppos 3/4/2019 Active   docusate sodium (COLACE) 100 MG Cap 3/4/2019 Active   fluticasone (FLONASE) 50 MCG/ACT nasal spray 3/4/2019 Active   furosemide (LASIX) 40 MG Tab 3/4/2019 Active   gabapentin (NEURONTIN) 600 MG tablet  "3/4/2019 Active   lactulose 10 GM/15ML Solution 3/4/2019 Active   levothyroxine (SYNTHROID) 137 MCG Tab 3/4/2019 Active   Linaclotide (LINZESS) 290 MCG Cap 3/4/2019 Active   midodrine (PROAMATINE) 5 MG Tab 3/3/2019 Active   mycophenolate (CELLCEPT) 250 MG Cap 3/4/2019 Active   NON SPECIFIED 3/3/2019 Active   omeprazole (PRILOSEC) 40 MG delayed-release capsule 3/4/2019 Active   ondansetron (ZOFRAN ODT) 4 MG TABLET DISPERSIBLE 3/4/2019 Active   pravastatin (PRAVACHOL) 20 MG Tab 3/4/2019 Active   predniSONE (DELTASONE) 1 MG Tab 3/4/2019 Active   predniSONE (DELTASONE) 5 MG Tab 3/4/2019 Active   Probiotic Product (PROBIOTIC DAILY PO) 3/3/2019 Active   sennosides (SENOKOT) 8.6 MG Tab 3/4/2019 Active   sertraline (ZOLOFT) 100 MG Tab 3/3/2019 Active   SPIRIVA HANDIHALER 18 MCG Cap 3/4/2019 Active   tacrolimus (PROGRAF) 0.5 MG Cap 3/4/2019 Active   tacrolimus (PROGRAF) 1 MG Cap 3/4/2019 Active   Tadalafil, PAH, (ADCIRCA) 20 MG Tab 3/3/2019 Active   traZODone (DESYREL) 50 MG Tab 3/3/2019 Active              ALLERGIES  Allergies   Allergen Reactions   • Rhubarb Anaphylaxis   • Organic Nitrates      Nitroglycerin products should be avoided with the use of PDE-5 inhibitors as the combination can result in severe hypotension.  RD clarified with pt: Nitrates in food are okay and pt does not want nitrates to be listed as food allergy. Pt only avoids medications with nitrates.    • Vancomycin Hcl      Causes red man syndrome when infused to fast     • Acetaminophen      Can not take, hx of liver transplant    • Nsaids      Can not take due to hx of liver transplant    • Other Drug      Any medication that may interact with cyclosporine, tacrolimus, sirolimus, or prograf (due to hx of liver transplant)      PHYSICAL EXAM  VITAL SIGNS: /63   Pulse 65   Temp 36.7 °C (98 °F) (Temporal)   Resp 18   Ht 1.727 m (5' 8\")   Wt 62.1 kg (137 lb)   LMP 01/03/2000   SpO2 99%   BMI 20.83 kg/m²     Nursing notes and vitals " reviewed.  Constitutional: Well developed, Well nourished, No acute distress, Non-toxic appearance.  Dry mucous membranes  Eyes: PERRLA, EOMI, Conjunctiva normal, No discharge.   Cardiovascular: Normal heart rate, Normal rhythm, No murmurs, No rubs, No gallops.   Thorax & Lungs: No respiratory distress, No rales, No rhonchi, No wheezing, No chest tenderness.   Abdomen: Bowel sounds normal, Soft, Diffuse abdominal tenderness, No guarding, No rebound, No masses, No pulsatile masses.  Slight splenomegalia and hepatomegalia.  Skin: No erythema, no edema   musculoskeletal: Intact distal pulses, No edema, No cyanosis, No clubbing. Good range of motion in all major joints. No tenderness to palpation or major deformities noted, no CVA tenderness, no midline back tenderness.   Neurologic: Alert & oriented x 3, Normal motor function, Normal sensory function, No focal deficits noted.  Psychiatric: Affect normal for clinical presentation.    DIAGNOSTIC STUDIES/PROCEDURES    LABS  Results for orders placed or performed during the hospital encounter of 03/04/19   LACTIC ACID   Result Value Ref Range    Lactic Acid 0.6 0.5 - 2.0 mmol/L   CBC WITH DIFFERENTIAL   Result Value Ref Range    WBC 3.5 (L) 4.8 - 10.8 K/uL    RBC 3.59 (L) 4.20 - 5.40 M/uL    Hemoglobin 11.5 (L) 12.0 - 16.0 g/dL    Hematocrit 35.0 (L) 37.0 - 47.0 %    MCV 97.5 81.4 - 97.8 fL    MCH 32.0 27.0 - 33.0 pg    MCHC 32.9 (L) 33.6 - 35.0 g/dL    RDW 50.0 35.9 - 50.0 fL    Platelet Count 60 (L) 164 - 446 K/uL    MPV 8.4 (L) 9.0 - 12.9 fL    Neutrophils-Polys 62.50 44.00 - 72.00 %    Lymphocytes 26.70 22.00 - 41.00 %    Monocytes 7.40 0.00 - 13.40 %    Eosinophils 2.80 0.00 - 6.90 %    Basophils 0.30 0.00 - 1.80 %    Immature Granulocytes 0.30 0.00 - 0.90 %    Nucleated RBC 0.00 /100 WBC    Neutrophils (Absolute) 2.20 2.00 - 7.15 K/uL    Lymphs (Absolute) 0.94 (L) 1.00 - 4.80 K/uL    Monos (Absolute) 0.26 0.00 - 0.85 K/uL    Eos (Absolute) 0.10 0.00 - 0.51 K/uL     Baso (Absolute) 0.01 0.00 - 0.12 K/uL    Immature Granulocytes (abs) 0.01 0.00 - 0.11 K/uL    NRBC (Absolute) 0.00 K/uL   COMP METABOLIC PANEL   Result Value Ref Range    Sodium 141 135 - 145 mmol/L    Potassium 4.2 3.6 - 5.5 mmol/L    Chloride 106 96 - 112 mmol/L    Co2 30 20 - 33 mmol/L    Anion Gap 5.0 0.0 - 11.9    Glucose 90 65 - 99 mg/dL    Bun 15 8 - 22 mg/dL    Creatinine 0.81 0.50 - 1.40 mg/dL    Calcium 9.7 8.5 - 10.5 mg/dL    AST(SGOT) 33 12 - 45 U/L    ALT(SGPT) 34 2 - 50 U/L    Alkaline Phosphatase 39 30 - 99 U/L    Total Bilirubin 0.3 0.1 - 1.5 mg/dL    Albumin 3.7 3.2 - 4.9 g/dL    Total Protein 5.3 (L) 6.0 - 8.2 g/dL    Globulin 1.6 (L) 1.9 - 3.5 g/dL    A-G Ratio 2.3 g/dL   URINALYSIS   Result Value Ref Range    Color Yellow     Character Clear     Specific Gravity 1.042 <1.035    Ph 7.0 5.0 - 8.0    Glucose Negative Negative mg/dL    Ketones Negative Negative mg/dL    Protein Negative Negative mg/dL    Bilirubin Negative Negative    Urobilinogen, Urine 0.2 Negative    Nitrite Negative Negative    Leukocyte Esterase Moderate (A) Negative    Occult Blood Negative Negative    Micro Urine Req Microscopic    Influenza By PCR, A/B   Result Value Ref Range    Influenza virus A RNA Negative Negative    Influenza virus B, PCR Negative Negative   ESTIMATED GFR   Result Value Ref Range    GFR If African American >60 >60 mL/min/1.73 m 2    GFR If Non African American >60 >60 mL/min/1.73 m 2   URINE MICROSCOPIC (W/UA)   Result Value Ref Range    WBC 2-5 /hpf    RBC 2-5 (A) /hpf    Bacteria Negative None /hpf    Epithelial Cells Negative /hpf    Hyaline Cast 0-2 /lpf     *Note: Due to a large number of results and/or encounters for the requested time period, some results have not been displayed. A complete set of results can be found in Results Review.      All labs reviewed by me.    RADIOLOGY  CT-ABDOMEN-PELVIS WITH   Final Result      1.  There is no acute inflammatory process within the abdomen or pelvis.    2.  There are postoperative changes consistent with liver transplant with patent vascular structures and mildly dilated main portal vein and mild splenomegaly again seen.   3.  Incidental splenic cyst again seen.   4.  Surgically absent gallbladder without biliary dilatation.   5.  There is no bowel obstruction or acute bowel inflammation.      DX-CHEST-PORTABLE (1 VIEW)   Final Result      1.  There is no acute cardiopulmonary process.        The radiologist's interpretation of all radiological studies have been reviewed by me.    COURSE & MEDICAL DECISION MAKING  Pertinent Labs & Imaging studies reviewed. (See chart for details)    4:26 PM - Patient seen and examined at bedside. Patient will be treated with 4 mg morphine for her symptoms. Ordered Chest Xray, Lactic acid every two hours, CBC with differential, CMP, Urinalysis, Urine culture, Blood culture x2, and influenza A/B by PCR to evaluate her symptoms. I discussed that we would proceed with lab and radiographic evaluation and that she will likely need to be admitted to the hospital.  Patient agrees with this plan of care.      This is a charming 59 y.o. female that presents with abdominal pain, diarrhea, stated that she was hypotensive.  Here in the emergency department, she has no evidence of hypotension, no evidence of sepsis.  CT scan of the abdomen pelvis was completed that revealed evidence of splenomegaly dilated portal vein.  The patient has no evidence of ischemic bowel, small bowel obstruction, diverticulitis, or other significant abnormality.  She has evidence of a slight pancytopenia with increasing thrombocytopenia of 60,000.  At this point, the patient required morphine x2 for her abdominal discomfort.  She is not presented with severe hypotension as well.  The patient will be admitted to Dr. Ramirez for further evaluation and manage and complaint that she feels like she may become extremely ill in the near future.  Concerned secondary to the  patient's immune compromised state.    Discussed with Dr. Odell for admission to the hospital at 2100.  FINAL IMPRESSION  Abdominal pain  Thrombocytopenia  Pancytopenia  Diarrhea   I, González Abad (Scribe), am scribing for, and in the presence of, Praneeth Soliman D.O    Electronically signed by: González Abad (Scribe), 3/4/2019    I, Praneeth Soliman D.O. personally performed the services described in this documentation, as scribed by González Abad in my presence, and it is both accurate and complete.  C.    The note accurately reflects work and decisions made by me.  Praneeth Soliman  3/4/2019  9:13 PM

## 2019-03-05 NOTE — ED NOTES
2nd litre of NS hung wide open.  Pt tolerating well.  Pt remains asymptomatic.  Lab add on per hospitalist

## 2019-03-05 NOTE — ED TRIAGE NOTES
60 y/o female bib ambulance from home for evaluation of abd pain, mostly in the LUQ, pain radiates around to bilat flank. Pt states she has been feeling ill for several days, she has had diarrhea x 1 week. Hx of liver transplant and lung disease.

## 2019-03-05 NOTE — ASSESSMENT & PLAN NOTE
Stress dose hydrocortisone will start tapering dose and start Florinef  This is likely iatrogenic secondary to her chronic prednisone therapy

## 2019-03-05 NOTE — PROGRESS NOTES
Pt arrived to .     CHG completed. VSS. BP per flowsheet  2L NC  toelrating diet  Voiding in bedside commode

## 2019-03-05 NOTE — ASSESSMENT & PLAN NOTE
Follow-up on cultures  Blood pressure improved with stress dose hydrocortisone  DC IV fluids if tolerating diet  Add Florinef

## 2019-03-05 NOTE — ED NOTES
BP remains unchanged after completion of 1L NS bolus.  Pt remains asymptomatic.  Discussed with pharmacist giving PRN midodrine but will not give due to pt being bradycardic.

## 2019-03-05 NOTE — ED NOTES
Blood drawn and sent to lab. Nasal swab collected and sent to lab. Medicated per MAR. Aware of POC. Call light within reach.

## 2019-03-05 NOTE — ED NOTES
Pt blood pressure noticeably lower than before.  NS rate increased to 500mL/h for bolus.  Will monitor.

## 2019-03-05 NOTE — ED NOTES
Pt up to bedside commode. Urine sample collected and sent to lab. Unable to provide stool sample at this time. Report given to RN.

## 2019-03-05 NOTE — ASSESSMENT & PLAN NOTE
Etiology is not clear  CT of the abdomen with no acute findings  Minimize narcotic use  Advance diet as tolerated  She has not had any diarrhea since presentation DC stool studies ordered and monitor clinically

## 2019-03-05 NOTE — PROGRESS NOTES
2 RN skin check    No breakdown noted. Patient with scattered bruises. Hx of sarcoidosis causing skin discoloration. Otherwise no breakdown. Heels intact. Coccyx in tact. Ears/cheek in tact. CHG bath completed. Pt turns self.

## 2019-03-05 NOTE — PROGRESS NOTES
[facility] Medicine Daily Progress Note    Date of Service  3/5/2019    Chief Complaint  59 y.o. female admitted 3/4/2019 with history of liver transplant adrenal insufficiency admitted with abdominal pain and hypertension    Hospital Course          Interval Problem Update      SB SR  Afebrile  -120  4-5 L NC      Consultants/Specialty  None    Code Status  Full code    Disposition  telemetry    Review of Systems  Review of Systems   Constitutional: Negative for chills and fever.   HENT: Negative for congestion and sore throat.    Eyes: Negative for discharge and redness.   Respiratory: Negative for cough and hemoptysis.    Cardiovascular: Negative for chest pain.   Gastrointestinal: Positive for abdominal pain. Negative for blood in stool, melena, nausea and vomiting.   Musculoskeletal: Positive for joint pain.   Neurological: Positive for dizziness.   All other systems reviewed and are negative.       Physical Exam  Temp:  [36.7 °C (98 °F)] 36.7 °C (98 °F)  Pulse:  [50-73] 61  Resp:  [15-21] 15  BP: (123)/(63) 123/63  SpO2:  [93 %-99 %] 96 %    Physical Exam   Constitutional: She is oriented to person, place, and time. She appears well-developed and well-nourished.   HENT:   Head: Normocephalic and atraumatic.   Right Ear: External ear normal.   Left Ear: External ear normal.   Mouth/Throat: No oropharyngeal exudate.   Eyes: Conjunctivae are normal. Right eye exhibits no discharge. Left eye exhibits no discharge. No scleral icterus.   Neck: Neck supple. No JVD present. No tracheal deviation present.   Cardiovascular: Normal rate and regular rhythm.  Exam reveals no gallop and no friction rub.    No murmur heard.  Pulmonary/Chest: Effort normal and breath sounds normal. No stridor. No respiratory distress. She has no wheezes. She has no rales. She exhibits no tenderness.   Abdominal: Soft. Bowel sounds are normal. She exhibits no distension. There is tenderness (Mild generalized). There is no rebound and no  guarding.   Musculoskeletal: She exhibits edema (Trace). She exhibits no tenderness.   Neurological: She is alert and oriented to person, place, and time. No cranial nerve deficit. She exhibits normal muscle tone.   Skin: Skin is warm and dry. She is not diaphoretic. No cyanosis. Nails show no clubbing.   Psychiatric: She has a normal mood and affect. Her behavior is normal. Thought content normal.   Nursing note and vitals reviewed.      Fluids    Intake/Output Summary (Last 24 hours) at 03/05/19 0954  Last data filed at 03/05/19 0800   Gross per 24 hour   Intake          1581.67 ml   Output              125 ml   Net          1456.67 ml       Laboratory  Recent Labs      03/04/19   1659 03/05/19 0222   WBC  3.5*  2.7*   RBC  3.59*  3.31*   HEMOGLOBIN  11.5*  10.7*   HEMATOCRIT  35.0*  32.1*   MCV  97.5  97.0   MCH  32.0  32.3   MCHC  32.9*  33.3*   RDW  50.0  50.6*   PLATELETCT  60*  65*   MPV  8.4*  9.3     Recent Labs      03/04/19   1659 03/05/19 0222   SODIUM  141  144   POTASSIUM  4.2  3.6   CHLORIDE  106  109   CO2  30  30   GLUCOSE  90  89   BUN  15  14   CREATININE  0.81  1.01   CALCIUM  9.7  8.4*                   Imaging  CT-ABDOMEN-PELVIS WITH   Final Result      1.  There is no acute inflammatory process within the abdomen or pelvis.   2.  There are postoperative changes consistent with liver transplant with patent vascular structures and mildly dilated main portal vein and mild splenomegaly again seen.   3.  Incidental splenic cyst again seen.   4.  Surgically absent gallbladder without biliary dilatation.   5.  There is no bowel obstruction or acute bowel inflammation.      DX-CHEST-PORTABLE (1 VIEW)   Final Result      1.  There is no acute cardiopulmonary process.           Assessment/Plan  * AP (abdominal pain)   Assessment & Plan    Etiology is not clear  CT of the abdomen with no acute findings  Minimize narcotic use  Advance diet as tolerated  She has not had any diarrhea since  presentation DC stool studies ordered and monitor clinically     Adrenal insufficiency (HCC)   Assessment & Plan    Stress dose hydrocortisone will start tapering dose and start Florinef  This is likely iatrogenic secondary to her chronic prednisone therapy       Hypotension- (present on admission)   Assessment & Plan    Follow-up on cultures  Blood pressure improved with stress dose hydrocortisone  DC IV fluids if tolerating diet  Add Florinef     Immunocompromised state (HCC)- (present on admission)   Assessment & Plan    Continue home dose of CellCept and Prograf     Chronic respiratory failure with hypoxia (HCC)- (present on admission)   Assessment & Plan    Continue home oxygen and monitor     Bradycardia   Assessment & Plan    Telemetry monitoring     Pancytopenia ([facility])   Assessment & Plan    This is acute on chronic    Monitor CBC     SIRS (systemic inflammatory response syndrome) ([facility])   Assessment & Plan    Improved    No clear source of infection monitor clinically off antibiotics and follow-up on cultures     History of Clostridium difficile infection- (present on admission)   Assessment & Plan    No diarrhea monitor clinically     Splenomegaly- (present on admission)   Assessment & Plan    Chronic     Pulmonary hypertension (HCC)- (present on admission)   Assessment & Plan    Resume tadalafil and ambrisentan as blood pressure tolerates          Plan of care reviewed with patient and  at bedside and the questions answered    VTE prophylaxis: SCD

## 2019-03-05 NOTE — ED NOTES
Med rec updated and complete.  Allergies reviewed. Met with pt at bedside and dicussed  Current medications and last doses taken.  Pt provided a detailed list. Pt currently has her letairis and   And adcirca at bedside.  pharmD notified.

## 2019-03-05 NOTE — ED NOTES
Dr. Persaud gave verbal authorization for pt to have a regular meal try to see how pt will tolerate it.    Pt moved from [facility] to hospital bed    Meal box given to pt.

## 2019-03-05 NOTE — ASSESSMENT & PLAN NOTE
Improved    No clear source of infection monitor clinically off antibiotics and follow-up on cultures

## 2019-03-06 ENCOUNTER — PATIENT OUTREACH (OUTPATIENT)
Dept: HEALTH INFORMATION MANAGEMENT | Facility: OTHER | Age: 59
End: 2019-03-06

## 2019-03-06 VITALS
SYSTOLIC BLOOD PRESSURE: 123 MMHG | OXYGEN SATURATION: 97 % | HEART RATE: 71 BPM | TEMPERATURE: 97.7 F | BODY MASS INDEX: 22.05 KG/M2 | DIASTOLIC BLOOD PRESSURE: 63 MMHG | HEIGHT: 68 IN | WEIGHT: 145.5 LBS | RESPIRATION RATE: 18 BRPM

## 2019-03-06 PROBLEM — R65.10 SIRS (SYSTEMIC INFLAMMATORY RESPONSE SYNDROME) (HCC): Status: RESOLVED | Noted: 2019-03-05 | Resolved: 2019-03-06

## 2019-03-06 PROBLEM — I95.9 HYPOTENSION: Status: RESOLVED | Noted: 2018-10-28 | Resolved: 2019-03-06

## 2019-03-06 PROBLEM — R00.1 BRADYCARDIA: Status: RESOLVED | Noted: 2019-03-05 | Resolved: 2019-03-06

## 2019-03-06 LAB
ANION GAP SERPL CALC-SCNC: 3 MMOL/L (ref 0–11.9)
BACTERIA UR CULT: NORMAL
BASOPHILS # BLD AUTO: 0.3 % (ref 0–1.8)
BASOPHILS # BLD: 0.01 K/UL (ref 0–0.12)
BUN SERPL-MCNC: 14 MG/DL (ref 8–22)
CALCIUM SERPL-MCNC: 8.6 MG/DL (ref 8.5–10.5)
CHLORIDE SERPL-SCNC: 109 MMOL/L (ref 96–112)
CO2 SERPL-SCNC: 28 MMOL/L (ref 20–33)
CREAT SERPL-MCNC: 0.78 MG/DL (ref 0.5–1.4)
EOSINOPHIL # BLD AUTO: 0.02 K/UL (ref 0–0.51)
EOSINOPHIL NFR BLD: 0.6 % (ref 0–6.9)
ERYTHROCYTE [DISTWIDTH] IN BLOOD BY AUTOMATED COUNT: 49.2 FL (ref 35.9–50)
GLUCOSE SERPL-MCNC: 186 MG/DL (ref 65–99)
HCT VFR BLD AUTO: 33.9 % (ref 37–47)
HGB BLD-MCNC: 11.3 G/DL (ref 12–16)
IMM GRANULOCYTES # BLD AUTO: 0.01 K/UL (ref 0–0.11)
IMM GRANULOCYTES NFR BLD AUTO: 0.3 % (ref 0–0.9)
LYMPHOCYTES # BLD AUTO: 0.51 K/UL (ref 1–4.8)
LYMPHOCYTES NFR BLD: 14.6 % (ref 22–41)
MAGNESIUM SERPL-MCNC: 2 MG/DL (ref 1.5–2.5)
MCH RBC QN AUTO: 31.9 PG (ref 27–33)
MCHC RBC AUTO-ENTMCNC: 33.3 G/DL (ref 33.6–35)
MCV RBC AUTO: 95.8 FL (ref 81.4–97.8)
MONOCYTES # BLD AUTO: 0.18 K/UL (ref 0–0.85)
MONOCYTES NFR BLD AUTO: 5.1 % (ref 0–13.4)
NEUTROPHILS # BLD AUTO: 2.77 K/UL (ref 2–7.15)
NEUTROPHILS NFR BLD: 79.1 % (ref 44–72)
NRBC # BLD AUTO: 0 K/UL
NRBC BLD-RTO: 0 /100 WBC
PHOSPHATE SERPL-MCNC: 3.7 MG/DL (ref 2.5–4.5)
PLATELET # BLD AUTO: 64 K/UL (ref 164–446)
PMV BLD AUTO: 8.9 FL (ref 9–12.9)
POTASSIUM SERPL-SCNC: 4.6 MMOL/L (ref 3.6–5.5)
RBC # BLD AUTO: 3.54 M/UL (ref 4.2–5.4)
SIGNIFICANT IND 70042: NORMAL
SITE SITE: NORMAL
SODIUM SERPL-SCNC: 140 MMOL/L (ref 135–145)
SOURCE SOURCE: NORMAL
WBC # BLD AUTO: 3.5 K/UL (ref 4.8–10.8)

## 2019-03-06 PROCEDURE — A9270 NON-COVERED ITEM OR SERVICE: HCPCS | Performed by: EMERGENCY MEDICINE

## 2019-03-06 PROCEDURE — 700102 HCHG RX REV CODE 250 W/ 637 OVERRIDE(OP): Performed by: HOSPITALIST

## 2019-03-06 PROCEDURE — 99239 HOSP IP/OBS DSCHRG MGMT >30: CPT | Performed by: HOSPITALIST

## 2019-03-06 PROCEDURE — 700102 HCHG RX REV CODE 250 W/ 637 OVERRIDE(OP): Performed by: EMERGENCY MEDICINE

## 2019-03-06 PROCEDURE — 700111 HCHG RX REV CODE 636 W/ 250 OVERRIDE (IP): Performed by: HOSPITALIST

## 2019-03-06 PROCEDURE — A9270 NON-COVERED ITEM OR SERVICE: HCPCS | Performed by: HOSPITALIST

## 2019-03-06 PROCEDURE — 84100 ASSAY OF PHOSPHORUS: CPT

## 2019-03-06 PROCEDURE — 85025 COMPLETE CBC W/AUTO DIFF WBC: CPT

## 2019-03-06 PROCEDURE — 80048 BASIC METABOLIC PNL TOTAL CA: CPT

## 2019-03-06 PROCEDURE — 83735 ASSAY OF MAGNESIUM: CPT

## 2019-03-06 PROCEDURE — 700111 HCHG RX REV CODE 636 W/ 250 OVERRIDE (IP): Mod: JG | Performed by: HOSPITALIST

## 2019-03-06 RX ORDER — FLUDROCORTISONE ACETATE 0.1 MG/1
0.1 TABLET ORAL EVERY MORNING
Qty: 30 TAB | Refills: 0 | Status: SHIPPED | OUTPATIENT
Start: 2019-03-07 | End: 2019-03-14 | Stop reason: SDUPTHER

## 2019-03-06 RX ORDER — FUROSEMIDE 40 MG/1
20 TABLET ORAL
Start: 2019-03-06 | End: 2019-05-01 | Stop reason: SDUPTHER

## 2019-03-06 RX ADMIN — TACROLIMUS 1.5 MG: 0.5 CAPSULE ORAL at 08:20

## 2019-03-06 RX ADMIN — MYCOPHENOLATE MOFETIL 500 MG: 250 CAPSULE ORAL at 08:21

## 2019-03-06 RX ADMIN — GABAPENTIN 600 MG: 300 CAPSULE ORAL at 05:06

## 2019-03-06 RX ADMIN — PRAVASTATIN SODIUM 20 MG: 20 TABLET ORAL at 05:06

## 2019-03-06 RX ADMIN — AMBRISENTAN 10 MG: 10 TABLET, FILM COATED ORAL at 06:00

## 2019-03-06 RX ADMIN — GABAPENTIN 600 MG: 300 CAPSULE ORAL at 11:30

## 2019-03-06 RX ADMIN — FLUDROCORTISONE ACETATE 0.1 MG: 0.1 TABLET ORAL at 05:16

## 2019-03-06 RX ADMIN — LEVOTHYROXINE SODIUM 137 MCG: 137 TABLET ORAL at 05:05

## 2019-03-06 RX ADMIN — OMEPRAZOLE 40 MG: 20 CAPSULE, DELAYED RELEASE ORAL at 05:06

## 2019-03-06 RX ADMIN — ALPRAZOLAM 1 MG: 1 TABLET ORAL at 05:06

## 2019-03-06 RX ADMIN — MORPHINE SULFATE 4 MG: 4 INJECTION INTRAVENOUS at 09:12

## 2019-03-06 RX ADMIN — HYDROCORTISONE SODIUM SUCCINATE 50 MG: 100 INJECTION, POWDER, FOR SOLUTION INTRAMUSCULAR; INTRAVENOUS at 05:05

## 2019-03-06 RX ADMIN — MORPHINE SULFATE 4 MG: 4 INJECTION INTRAVENOUS at 00:44

## 2019-03-06 RX ADMIN — SENNOSIDES AND DOCUSATE SODIUM 2 TABLET: 8.6; 5 TABLET ORAL at 05:06

## 2019-03-06 RX ADMIN — ASPIRIN 81 MG: 81 TABLET, COATED ORAL at 05:06

## 2019-03-06 RX ADMIN — ALPRAZOLAM 1 MG: 1 TABLET ORAL at 11:30

## 2019-03-06 RX ADMIN — BISACODYL 10 MG: 10 SUPPOSITORY RECTAL at 05:19

## 2019-03-06 RX ADMIN — MORPHINE SULFATE 4 MG: 4 INJECTION INTRAVENOUS at 05:06

## 2019-03-06 NOTE — DISCHARGE SUMMARY
Discharge Summary    CHIEF COMPLAINT ON ADMISSION  Chief Complaint   Patient presents with   • Abdominal Pain   • Diarrhea   • Flank Pain       Reason for Admission  EMS     Admission Date  3/4/2019    CODE STATUS  Full Code    HPI & HOSPITAL COURSE  This is a 59 y.o. female here with history of chronic respiratory failure pulmonary hypertension liver transplant and adrenal insufficiency presented with abdominal pain and hypotension.  She was admitted to the intensive care unit started on stress dose hydrocortisone IV fluids for hydration.  She had a CT of the abdomen which was negative for acute pathology.  Her blood cultures are negative.  Her blood pressure improved.  She has been chronically on steroids for her lung disease and history of liver transplant.  We started her on Florinef and she was tapered off hydrocortisone and IV fluids.  I saw the patient examined her today.  Her abdominal exam is benign.  She is tolerating her diet.  She ambulated with no lightheadedness or dizziness.  She would be maintained on low-dose Florinef and I will change her Lasix to as needed for lower extremity edema or weight gain of more than 2 pounds.  She would be discussing with her pulmonologist and her pathologist the need for long-term steroid therapy.           Therefore, she is discharged in good and stable condition to home with close outpatient follow-up.    The patient met 2-midnight criteria for an inpatient stay at the time of discharge.    Discharge Date  3/6/2019    FOLLOW UP ITEMS POST DISCHARGE  Monitor weight and monitor for edema  Follow-up with PCP and her pathology to discuss need for long-term prednisone    DISCHARGE DIAGNOSES  Principal Problem:    AP (abdominal pain) POA: Unknown  Active Problems:    Adrenal insufficiency (HCC) POA: Unknown    Chronic respiratory failure with hypoxia (HCC) POA: Yes    Immunocompromised state (HCC) POA: Yes    Status post liver transplant Dr. Canada ([facility]) POA: Yes       Overview: -December 2011: Status post liver transplant for end stage liver disease       at [facility], followed by Dr. Canada.          Pulmonary hypertension (HCC) POA: Yes      Overview: Initial evaluation at [facility] pre liver transplant, PFO closed w/o       complication, 1/25/2011, then worse PAH and R HF post transplant,       extensive evaluations at [facility] [place] with cath and drug trial, responding       to tadalafil and ambrisentan dramatically. Followed there with serial R       heart cath.      3/25/2014: Most recent R heart cath done Dr. Brenda Flores with mild       pulmonary hypertension and relatively high ouput      November 2014: Echocardiogram with normal LV size, LVEF 60-65%. Mild MR,       mild AI, mild TR, RVSP 29-34mmHg.      February 2015: Echocardiogram with mild concentric LVH, LVEF 65-70%. Mild       MR, mild AI, trace TR, RVSP 16mmHg.    Splenomegaly POA: Yes    Chronic prescription benzodiazepine use POA: Yes    History of Clostridium difficile infection POA: Yes    Pancytopenia (HCC) POA: Unknown  Resolved Problems:    Hypotension POA: Yes    SIRS (systemic inflammatory response syndrome) (HCC) POA: Unknown    Bradycardia POA: Unknown      FOLLOW UP  Future Appointments  Date Time Provider Department Center   3/14/2019 10:30 AM Gwyn Gaming M.D. PULM None   3/25/2019 2:20 PM S [facility] 1 [facility]   3/27/2019 7:30 AM [facility] HERNANDEZ None   5/23/2019 10:20 AM Elisa Phillip M.D. [facility] None   5/29/2019 11:30 AM Maxwell Phan M.D. [facility] None     as scheduled.             MEDICATIONS ON DISCHARGE     Medication List      START taking these medications      Instructions   fludrocortisone 0.1 MG Tabs  Start taking on:  3/7/2019  Commonly known as:  FLORINEF   Take 1 Tab by mouth every morning.  Dose:  0.1 mg        CHANGE how you take these medications      Instructions   furosemide 40 MG Tabs  What changed:  · when to take this  · reasons to take  this  · additional instructions  Commonly known as:  LASIX   Take 0.5 Tabs by mouth 1 time daily as needed. For edema or increased weight >2lbs  Dose:  20 mg        CONTINUE taking these medications      Instructions   ALPRAZolam 1 MG Tabs  Commonly known as:  XANAX   Take 1 mg by mouth 3 times a day.  Dose:  1 mg     ambrisentan 10 MG tablet  Commonly known as:  LETAIRIS   Take 1 Tab by mouth every day.  Dose:  10 mg     ascorbic acid 500 MG Tabs  Commonly known as:  ascorbic acid   Take 500 mg by mouth every morning.  Dose:  500 mg     aspirin 81 MG EC tablet   TAKE 1 TAB BY MOUTH EVERY DAY.  Dose:  81 mg     calcium citrate 315 mg-vitamin D 200 mg 315-200 MG-UNIT per tablet  Commonly known as:  CITRACAL+D   Take 1 Tab by mouth every day.  Dose:  1 Tab     calcium polycarbophil 625 MG Tabs  Commonly known as:  FIBERCON   Take 625 mg by mouth every morning.  Dose:  625 mg     CELLCEPT 250 MG Caps  Generic drug:  mycophenolate   Doctor's comments:  liver transplant  Take 500 mg by mouth every 12 hours.  Dose:  500 mg     [employer] GENTLE LAXATIVE 10 MG Supp  Generic drug:  bisacodyl   INSERT 1 SUPPOSITORY IN RECTUM 1 TIME DAILY AS NEEDED (CONSTIPATION).  Dose:  10 mg     docusate sodium 100 MG Caps  Commonly known as:  COLACE   Take 100 mg by mouth See Admin Instructions. Three times a day at 0700, 1200, and 1800  Dose:  100 mg     fluticasone 50 MCG/ACT nasal spray  Commonly known as:  FLONASE   SPRAY 1 SPRAY IN EACH NOSTRIL TWICE A DAY     gabapentin 600 MG tablet  Commonly known as:  NEURONTIN   TAKE 1 TABLET BY MOUTH THREE TIMES A DAY     lactulose 10 GM/15ML Soln   TAKE 30ML BY MOUTH TWICE DAILY     levothyroxine 137 MCG Tabs  Commonly known as:  SYNTHROID   TAKE 1 TAB BY MOUTH EVERY MORNING ON AN EMPTY STOMACH.  Dose:  137 mcg     Linaclotide 290 MCG Caps  Commonly known as:  LINZESS   Take 1 Cap by mouth every day.  Dose:  1 Cap     midodrine 5 MG Tabs  Commonly known as:  PROAMATINE   Take 5-15 mg by mouth 1 time  daily as needed.  Dose:  5-15 mg     NON SPECIFIED   Take 1 Cap by mouth every bedtime. Bariatric Dietary Supplment  Dose:  1 Cap     omeprazole 40 MG delayed-release capsule  Commonly known as:  PRILOSEC   Take 1 Cap by mouth every day.  Dose:  40 mg     ondansetron 4 MG Tbdp  Commonly known as:  ZOFRAN ODT   Take 4 mg by mouth every 8 hours as needed for Nausea.  Dose:  4 mg     pravastatin 20 MG Tabs  Commonly known as:  PRAVACHOL   Take 1 Tab by mouth every day.  Dose:  20 mg     * predniSONE 1 MG Tabs  Commonly known as:  DELTASONE   Take 2 mg by mouth every morning. Pt also has an RX for 5MG, pt takes total of 7MG QDAY  Dose:  2 mg     * predniSONE 5 MG Tabs  Commonly known as:  DELTASONE   Take 5 mg by mouth every morning. Pt also has an RX for 1 MG  pt takes total of 7MG QDAY  Dose:  5 mg     PROBIOTIC DAILY PO   Take 1 Cap by mouth every evening.  Dose:  1 Cap     SENOKOT 8.6 MG Tabs  Generic drug:  sennosides   Take 17.2 mg by mouth every 12 hours.  Dose:  17.2 mg     sertraline 100 MG Tabs  Commonly known as:  ZOLOFT   Take 1 Tab by mouth every bedtime.  Dose:  100 mg     SPIRIVA HANDIHALER 18 MCG Caps  Generic drug:  tiotropium   INHALE 1 CAPSULE VIA HANDIHALER ONCE DAILY AT THE SAME TIME EVERY DAY     tacrolimus 0.5 MG Caps  Commonly known as:  PROGRAF   Take 0.5 mg by mouth every 12 hours. Pt also has an RX for 1MG, pt takes total of 1.5MG BID  Dose:  0.5 mg     tacrolimus 1 MG Caps  Commonly known as:  PROGRAF   Take 1 mg by mouth every 12 hours. Pt also has an RX for 0.5MG, pt takes total of 1.5MG BID  Dose:  1 mg     Tadalafil (PAH) 20 MG Tabs  Commonly known as:  ADCIRCA   Take 20 mg by mouth every bedtime.  Dose:  20 mg     traZODone 50 MG Tabs  Commonly known as:  DESYREL   Take 2 Tabs by mouth every bedtime.  Dose:  100 mg        * This list has 2 medication(s) that are the same as other medications prescribed for you. Read the directions carefully, and ask your doctor or other care provider to  review them with you.                Allergies  Allergies   Allergen Reactions   • Rhubarb Anaphylaxis   • Organic Nitrates      Nitroglycerin products should be avoided with the use of PDE-5 inhibitors as the combination can result in severe hypotension.  RD clarified with pt: Nitrates in food are okay and pt does not want nitrates to be listed as food allergy. Pt only avoids medications with nitrates.    • Vancomycin Hcl      Causes red man syndrome when infused to fast     • Acetaminophen      Can not take, hx of liver transplant    • Nsaids      Can not take due to hx of liver transplant    • Other Drug      Any medication that may interact with cyclosporine, tacrolimus, sirolimus, or prograf (due to hx of liver transplant)        DIET  Orders Placed This Encounter   Procedures   • Diet Order Regular     Standing Status:   Standing     Number of Occurrences:   1     Order Specific Question:   Diet:     Answer:   Regular [1]       ACTIVITY  As tolerated.  Weight bearing as tolerated    CONSULTATIONS  none    PROCEDURES  none    LABORATORY  Lab Results   Component Value Date    SODIUM 140 03/06/2019    POTASSIUM 4.6 03/06/2019    CHLORIDE 109 03/06/2019    CO2 28 03/06/2019    GLUCOSE 186 (H) 03/06/2019    BUN 14 03/06/2019    CREATININE 0.78 03/06/2019        Lab Results   Component Value Date    WBC 3.5 (L) 03/06/2019    HEMOGLOBIN 11.3 (L) 03/06/2019    HEMATOCRIT 33.9 (L) 03/06/2019    PLATELETCT 64 (L) 03/06/2019        Total time of the discharge process exceeds 35 minutes.

## 2019-03-06 NOTE — CARE PLAN
Problem: Safety  Goal: Will remain free from falls  Outcome: PROGRESSING AS EXPECTED    Intervention: Assess risk factors for falls  Mague Funk Fall assessment done.   Intervention: Implement fall precautions  Fall precautions in place.       Problem: Fluid Volume:  Goal: Will maintain balanced intake and output  Outcome: PROGRESSING AS EXPECTED    Intervention: Monitor, educate, and encourage compliance with therapeutic intake of liquids  Strict I and O taken.

## 2019-03-06 NOTE — CARE PLAN
Problem: Communication  Goal: The ability to communicate needs accurately and effectively will improve  Outcome: PROGRESSING AS EXPECTED  Pt able to communicate needs effectively.       Problem: Safety  Goal: Will remain free from injury  Outcome: PROGRESSING AS EXPECTED  Bed alarm on. Pt call light within reach, bed in lowest position, pt educated on use of call light and importance of bed alarm.

## 2019-03-06 NOTE — RESPIRATORY CARE
COPD EDUCATION by COPD CLINICAL EDUCATOR  3/6/2019 at 9:44 AM by Mattie العراقي     Patient reviewed by COPD education team. Patient does not qualify for COPD program.

## 2019-03-06 NOTE — CARE PLAN
Problem: Safety  Goal: Will remain free from falls  Outcome: PROGRESSING AS EXPECTED    Intervention: Assess risk factors for falls  Risk factors assessed, nonskid footwear on and bed is locked and in lowest position.  Intervention: Implement fall precautions  Nonskid footwear on and bed is locked and in the lowest position.

## 2019-03-06 NOTE — DISCHARGE INSTRUCTIONS
Discharge Instructions    Discharged to home by car with relative. Discharged via wheelchair, hospital escort: Yes.  Special equipment needed: Oxygen    Be sure to schedule a follow-up appointment with your primary care doctor or any specialists as instructed.     Discharge Plan:   Diet Plan: Discussed  Activity Level: Discussed  Confirmed Follow up Appointment: Appointment Scheduled  Confirmed Symptoms Management: Discussed  Medication Reconciliation Updated: Yes  Pneumococcal Vaccine Administered/Refused: Not given - Patient refused pneumococcal vaccine  Influenza Vaccine Indication: Not indicated: Previously immunized this influenza season and > 8 years of age    I understand that a diet low in cholesterol, fat, and sodium is recommended for good health. Unless I have been given specific instructions below for another diet, I accept this instruction as my diet prescription.   Other diet: Previous diet    Special Instructions: None    · Is patient discharged on Warfarin / Coumadin?   No     Depression / Suicide Risk    As you are discharged from this [facility] facility, it is important to learn how to keep safe from harming yourself.    Recognize the warning signs:  · Abrupt changes in personality, positive or negative- including increase in energy   · Giving away possessions  · Change in eating patterns- significant weight changes-  positive or negative  · Change in sleeping patterns- unable to sleep or sleeping all the time   · Unwillingness or inability to communicate  · Depression  · Unusual sadness, discouragement and loneliness  · Talk of wanting to die  · Neglect of personal appearance   · Rebelliousness- reckless behavior  · Withdrawal from people/activities they love  · Confusion- inability to concentrate     If you or a loved one observes any of these behaviors or has concerns about self-harm, here's what you can do:  · Talk about it- your feelings and reasons for harming yourself  · Remove any means  that you might use to hurt yourself (examples: pills, rope, extension cords, firearm)  · Get professional help from the community (Mental Health, Substance Abuse, psychological counseling)  · Do not be alone:Call your Safe Contact- someone whom you trust who will be there for you.  · Call your local CRISIS HOTLINE 612-0207 or 868-816-0686  · Call your local Children's Mobile Crisis Response Team [place] (942) 633-7279 or www.[employer]  · Call the toll free National Suicide Prevention Hotlines   · National Suicide Prevention Lifeline 251-595-UKZI (8695)  · National Hope Line Network 800-SUICIDE (492-8581)

## 2019-03-06 NOTE — PROGRESS NOTES
Pt discharged home per MD orders. D/c instructions were given and all questions were answered. All belongings were accounted for and with pt at time of d/c.

## 2019-03-07 ENCOUNTER — PATIENT OUTREACH (OUTPATIENT)
Dept: HEALTH INFORMATION MANAGEMENT | Facility: OTHER | Age: 59
End: 2019-03-07

## 2019-03-08 ENCOUNTER — TELEPHONE (OUTPATIENT)
Dept: HEALTH INFORMATION MANAGEMENT | Facility: OTHER | Age: 59
End: 2019-03-08

## 2019-03-08 ENCOUNTER — PATIENT OUTREACH (OUTPATIENT)
Dept: HEALTH INFORMATION MANAGEMENT | Facility: OTHER | Age: 59
End: 2019-03-08

## 2019-03-08 NOTE — PROGRESS NOTES
SCP post discharge med rec completed. No clinically significant medication issues noted. Unable to reach patient for f/u. Left  for patient to call [phone].

## 2019-03-08 NOTE — TELEPHONE ENCOUNTER
Dear Dr. Phillip,    Patient has been taking prednisone 7mg daily and was prescribed Florinef at d/c. She would like to know if she is to add Florinef to prednisone or taper off of prednisone? She would also like to know the duration of both medications. She has reached out to her hepatologist in [place] and they have deferred to PCP.    Thanks for your time,  Savage Newton, PharmD   [facility] [id number]

## 2019-03-11 ENCOUNTER — TELEPHONE (OUTPATIENT)
Dept: MEDICAL GROUP | Facility: LAB | Age: 59
End: 2019-03-11

## 2019-03-11 NOTE — PROGRESS NOTES
Referral from Gianna at [facility] stating patient had medication questions after discharge. Patient has been taking prednisone 7mg daily and was prescribed Florinef at d/c. She would like to know if she is to add Florinef to prednisone or taper off of prednisone. She would also like to know the duration of both medications. She has reached out to her hepatologist in  and they have deferred to PCP.    Was able to clarify discharge instructions with Dr. Arabella Marte. He would like patient to continue on 7mg daily of prednisone and add Florinef until seen by outpatient specialist. Outbound call to patient to notify. She states she did try to reach hepatology specialist at [facility] and they would like to defer to PCP as the course of steroids is to treat lung disorder and is not liver transplant. Message sent to PCP.

## 2019-03-11 NOTE — TELEPHONE ENCOUNTER
ESTABLISHED PATIENT PRE-VISIT PLANNING     Patient was NOT contacted to complete PVP.     Note: Patient will not be contacted if there is no indication to call.     1.  Reviewed notes from the last few office visits within the medical group: Yes    2.  If any orders were placed at last visit or intended to be done for this visit (i.e. 6 mos follow-up), do we have Results/Consult Notes?        •  Labs - Labs ordered, completed on 1/30/19 and results are in chart.       •  Imaging - Imaging was not ordered at last office visit.       •  Referrals - No referrals were ordered at last office visit.    3. Is this appointment scheduled as a Hospital Follow-Up? No    4.  Immunizations were updated in Epic using WebIZ?: Epic matches WebIZ       •  Web Iz Recommendations: SHINGRIX (Shingles)    5.  Patient is due for the following Health Maintenance Topics:   Health Maintenance Due   Topic Date Due   • Annual Wellness Visit  1960   • IMM ZOSTER VACCINES (2 of 2) 01/29/2019       - Patient has completed FLU, PNEUMOVAX (PPSV23), PREVNAR (PCV13)  and TDAP Immunization(s) per WebIZ. Chart has been updated.    6. Orders for overdue Health Maintenance topics pended in Pre-Charting? N\A    7.  AHA (MDX) form printed for Provider? No, already completed    8.  Patient was NOT informed to arrive 15 min prior to their scheduled appointment and bring in their medication bottles.

## 2019-03-12 ENCOUNTER — OFFICE VISIT (OUTPATIENT)
Dept: MEDICAL GROUP | Facility: LAB | Age: 59
End: 2019-03-12
Payer: MEDICARE

## 2019-03-12 VITALS
DIASTOLIC BLOOD PRESSURE: 76 MMHG | OXYGEN SATURATION: 93 % | RESPIRATION RATE: 20 BRPM | HEART RATE: 75 BPM | BODY MASS INDEX: 21.38 KG/M2 | TEMPERATURE: 100 F | SYSTOLIC BLOOD PRESSURE: 126 MMHG | WEIGHT: 141.09 LBS | HEIGHT: 68 IN

## 2019-03-12 DIAGNOSIS — E27.40 ADRENAL INSUFFICIENCY (HCC): ICD-10-CM

## 2019-03-12 DIAGNOSIS — D61.818 PANCYTOPENIA (HCC): ICD-10-CM

## 2019-03-12 PROCEDURE — 99214 OFFICE O/P EST MOD 30 MIN: CPT | Performed by: FAMILY MEDICINE

## 2019-03-12 NOTE — TELEPHONE ENCOUNTER
Patient needs to follow up with endocrinology and pulmonology for this answer.  Elisa Phillip M.D.

## 2019-03-14 ENCOUNTER — OFFICE VISIT (OUTPATIENT)
Dept: PULMONOLOGY | Facility: HOSPICE | Age: 59
End: 2019-03-14
Payer: MEDICARE

## 2019-03-14 ENCOUNTER — TELEPHONE (OUTPATIENT)
Dept: MEDICAL GROUP | Facility: LAB | Age: 59
End: 2019-03-14

## 2019-03-14 VITALS
RESPIRATION RATE: 16 BRPM | BODY MASS INDEX: 21.22 KG/M2 | TEMPERATURE: 98.3 F | WEIGHT: 140 LBS | HEART RATE: 68 BPM | OXYGEN SATURATION: 96 % | HEIGHT: 68 IN | SYSTOLIC BLOOD PRESSURE: 128 MMHG | DIASTOLIC BLOOD PRESSURE: 78 MMHG

## 2019-03-14 DIAGNOSIS — G47.33 OBSTRUCTIVE SLEEP APNEA: ICD-10-CM

## 2019-03-14 DIAGNOSIS — I27.20 PULMONARY HYPERTENSION (HCC): ICD-10-CM

## 2019-03-14 DIAGNOSIS — Z94.4 STATUS POST LIVER TRANSPLANT (HCC): ICD-10-CM

## 2019-03-14 DIAGNOSIS — Q21.11 OSTIUM SECUNDUM TYPE ATRIAL SEPTAL DEFECT: ICD-10-CM

## 2019-03-14 DIAGNOSIS — Z98.84 HISTORY OF ROUX-EN-Y GASTRIC BYPASS: ICD-10-CM

## 2019-03-14 DIAGNOSIS — R09.02 HYPOXEMIA: ICD-10-CM

## 2019-03-14 DIAGNOSIS — D84.9 IMMUNOCOMPROMISED STATE (HCC): ICD-10-CM

## 2019-03-14 DIAGNOSIS — D86.9 SARCOIDOSIS: ICD-10-CM

## 2019-03-14 DIAGNOSIS — Z98.890 HISTORY OF LUNG BIOPSY: ICD-10-CM

## 2019-03-14 DIAGNOSIS — J69.0 ASPIRATION PNEUMONIA OF BOTH LOWER LOBES DUE TO GASTRIC SECRETIONS (HCC): ICD-10-CM

## 2019-03-14 DIAGNOSIS — K76.81 HEPATOPULMONARY SYNDROME (HCC): ICD-10-CM

## 2019-03-14 PROCEDURE — 99214 OFFICE O/P EST MOD 30 MIN: CPT | Performed by: INTERNAL MEDICINE

## 2019-03-14 RX ORDER — ONDANSETRON 4 MG/1
4 TABLET, ORALLY DISINTEGRATING ORAL EVERY 8 HOURS PRN
Qty: 10 TAB | Refills: 1 | Status: SHIPPED | OUTPATIENT
Start: 2019-03-14 | End: 2019-04-04 | Stop reason: SDUPTHER

## 2019-03-14 RX ORDER — FLUDROCORTISONE ACETATE 0.1 MG/1
0.1 TABLET ORAL EVERY MORNING
Qty: 30 TAB | Refills: 11 | Status: ON HOLD | OUTPATIENT
Start: 2019-03-14 | End: 2021-08-03

## 2019-03-14 RX ORDER — TACROLIMUS 1 MG/1
1 CAPSULE ORAL
Status: ON HOLD | COMMUNITY
Start: 2019-03-04 | End: 2021-08-03

## 2019-03-14 RX ORDER — IBUPROFEN 600 MG/1
TABLET ORAL
Status: ON HOLD | COMMUNITY
Start: 2019-03-03 | End: 2021-08-03

## 2019-03-14 ASSESSMENT — PAIN SCALES - GENERAL: PAINLEVEL: 2=MINIMAL-SLIGHT

## 2019-03-14 NOTE — TELEPHONE ENCOUNTER
VOICEMAIL  1. Caller Name: Roxana Johansson                      Call Back Number: [phone] (home)       2. Message: Pt called and left another voicemail stating that she met with her pulmonary doctor and that they devised a plan to reduce the prednisone by 1 mg every two weeks - they believe that the prednisone needs to go to make way for an adrenal reboot.     3. Patient approves office to leave a detailed voicemail/MyChart message: N\A

## 2019-03-14 NOTE — LETTER
Gwyn Gaming M.D.  [facility] Pulmonary Medicine   [address] [place] [address] [place]  [place] [address]  Phone: [phone] - Fax: [phone]           Encounter Date: 3/14/2019  Provider: Gwyn Gaming M.D.  Location of Care: 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 She has a very complex history. She has a history of biopsy-proven sarcoidosis. She also has a history of cirrhosis status post liver transplantation in December 2011 with chronic immunosuppression. Her medications include mycophenolate, tacrolimus, and prednisone at 7 mg per day. She has a history of hepatopulmonary syndrome with pulmonary hypertension and possible microvascular shunting. She has had an ASD repair that was done percutaneously. She has been hospitalized multiple times for various infections including pneumonia and tracheobronchitis. There is possibility that some of these events are due to microaspiration. She is on supplemental oxygen in the range of 5-6 L/m via nasal cannula. She has a history of complex sleep apnea. She has been treated with auto SV. She has a history of pulmonary hypertension with improvement on letairis and tadalafil. She has had previous bariatric surgery.  She was hospitalized at [facility]. She had bronchoscopy and eventually a wedge biopsy of the right lung which was initially interpreted at [facility] and [facility] as consistent with bronchiolitis obliterans. She has been followed by Dr. Arango at [facility] for her sarcoidosis. Her case was presented at a multidisciplinary conference and it was felt that the pathology was not consistent with bronchiolitis obliterans or any other interstitial lung disease. She recommended to continue  follow-up with pulmonary function testing and CT scanning as indicated.  A shunt evaluation at [facility] indicated a 14% shunt..     Because of the possibility of microaspiration she has been on Prilosec.   Remarkably she had had a CT scan ordered on January 19, 2018 to follow up previous infiltrates. Her outpatient scan on January 19 showed no evidence of significant pulmonary infiltrates. On January 20 the patient may have had another aspiration episode at night. She came to the emergency room with bilateral lower lobe pulmonary infiltrates and was admitted with a diagnosis of pneumonia and sepsis.  She has a history of a gastric sleeve. Because of her recurrent aspiration she had repeat surgery at [facility] by Dr. Jimenez on 3/19/18. She had a laparoscopic Evon-en-Y gastric bypass that was a conversion from her previous sleeve gastrectomy.   Since March she has lost over 30 pounds after the Evon and Y procedure.  She was hospitalized for relative hypotension necessitating reduction in some of her pulmonary hypertension medication.  Tadalafil was reduced to 20 mg per day and Lasix was reduced to 20 mg per day.  She continues on ambrisentan at 10 mg per day.  We reviewed her medications.  From a pulmonary perspective she remains on supplemental oxygen.  She has not had any further aspiration events after her surgery.   Her oxygen needs here in [place] on range of motion 5-6 L/m. When she does go to the [place] she was able to reduce her O2 supplementation to about 3 L/m. When she drives over [place] she finds that she needs to increase her oxygen to about 7 L/m to maintain adequate saturations.   Today she is at baseline and is comfortable on supplemental oxygen. She is in no acute distress.     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  She was recently hospitalized with abdominal pain and found to be hypotensive.  She was  "treated with stress dose hydrocortisone and started on Florinef in addition to midodrine.  She has been on Lasix at times because of lower extremity edema.  She needs to be careful to avoid hypovolemia.  She is interested in decreasing her prednisone dosage.  Her prednisone is for her liver transplant and not her pulmonary disease.  She tells me that Dr. Canada feels she can be weaned off prednisone.  We will wean her slowly by 1 mg every 2 weeks.  She knows to increase her dosage if she once again becomes hypotensive.        Past Medical History:   Diagnosis Date   • Anemia    • Anesthesia     \"takes more to get me under, would rather be intubated\"    • Breath shortness     cont oxygen 5L (Preffered)   • Bronchitis      2016   • Cardiomegaly    • Chronic fatigue and malaise    • Cirrhosis (HCC) December 2011    [place] [facility]   • CKD (chronic kidney disease) stage 3, GFR 30-59 ml/min ([facility])    • Diabetes ([facility])     reports hx of, resolved w/weight loss. Reports still checking bloodsugars twice daily and using insulin PRN   • Fracture of left foot    • GERD (gastroesophageal reflux disease)         • H/O Clostridium difficile infection 02-17-17    reports \"16 months ago\". Current stool sample 01-26-17 neg   • Hemorrhagic disorder ([facility])     \"low platelets\" bruises easily    • Hiatus hernia syndrome    • High cholesterol    • Hypothyroid    • Jaundice 2009   • Liver transplanted ([facility])    • Low back pain 02-17-17    and left foot, 7/10   • Mild aortic regurgitation and aortic valve sclerosis     • On home oxygen therapy     5 liters, Dr. Gaming   • Platelet disorder ([facility])     low platelets   • Pneumonia     aspiration,    • Psychiatric disorder     Mood disorder   • Pulmonary hypertension ([facility])        • S/P cholecystectomy    • Small bowel obstruction ([facility])     01-   • Splenomegaly    • VRE (vancomycin-resistant Enterococci)     02-17-17, pt declines knowledge of this       "       ROS:   Constitutional: Denies fevers, chills, night sweats, fatigue or weight loss  Eyes: Denies vision loss, pain, drainage, double vision  Ears, Nose, Throat: Denies earache, tinnitus, hoarseness  Cardiovascular: Denies chest pain, tightness, palpitations  Respiratory: See HPI  Sleep: See HPI  GI: She has occasional abdominal pain and has had a previous Evon-en-Y procedure  : Denies frequent urination, hematuria, painful urination  Musculoskeletal: Denies back pain, painful joints, sore muscles  Neurological: Denies headaches, seizures  Skin: Denies rashes, color changes  Psychiatric: Denies depression or thoughts of suicide  Hematologic: Denies bleeding tendency or clotting tendency  Allergic/Immunologic: Denies rhinitis, skin sensitivity    Social History     Social History   • Marital status:      Spouse name: N/A   • Number of children: N/A   • Years of education: N/A     Occupational History   • Not on file.     Social History Main Topics   • Smoking status: Never Smoker   • Smokeless tobacco: Never Used      Comment: avoid all tobacco products   • Alcohol use No      Comment: 05/2009 quit drinking   • Drug use: No   • Sexual activity: Not on file     Other Topics Concern   • Not on file     Social History Narrative   • No narrative on file     Rhubarb; Acetaminophen; Nsaids; Organic nitrates; Other drug; and Vancomycin hcl  Current Outpatient Prescriptions on File Prior to Visit   Medication Sig Dispense Refill   • gabapentin (NEURONTIN) 600 MG tablet TAKE 1 TABLET BY MOUTH THREE TIMES A DAY 90 Tab 1   • furosemide (LASIX) 40 MG Tab Take 0.5 Tabs by mouth 1 time daily as needed. For edema or increased weight >2lbs     • ALPRAZolam (XANAX) 1 MG Tab Take 1 mg by mouth 3 times a day.     • aspirin 81 MG EC tablet TAKE 1 TAB BY MOUTH EVERY DAY. 90 Tab 4   • levothyroxine (SYNTHROID) 137 MCG Tab TAKE 1 TAB BY MOUTH EVERY MORNING ON AN EMPTY STOMACH. 90 Tab 1   • Linaclotide (LINZESS) 290 MCG Cap  Take 1 Cap by mouth every day. 90 Cap 3   • omeprazole (PRILOSEC) 40 MG delayed-release capsule Take 1 Cap by mouth every day. 90 Cap 3   • traZODone (DESYREL) 50 MG Tab Take 2 Tabs by mouth every bedtime. 180 Tab 3   • lactulose 10 GM/15ML Solution TAKE 30ML BY MOUTH TWICE DAILY 1800 mL 3   • sertraline (ZOLOFT) 100 MG Tab Take 1 Tab by mouth every bedtime. 90 Tab 3   • calcium citrate 315 mg-vitamin D 200 mg (CITRACAL+D) 315-200 MG-UNIT per tablet Take 1 Tab by mouth every day. 90 Tab 3   • ambrisentan (LETAIRIS) 10 MG tablet Take 1 Tab by mouth every day. 90 Tab 3   • fluticasone (FLONASE) 50 MCG/ACT nasal spray SPRAY 1 SPRAY IN EACH NOSTRIL TWICE A DAY 48 g 3   • SPIRIVA HANDIHALER 18 MCG Cap INHALE 1 CAPSULE VIA HANDIHALER ONCE DAILY AT THE SAME TIME EVERY DAY 30 Cap 6   • Tadalafil, PAH, (ADCIRCA) 20 MG Tab Take 20 mg by mouth every bedtime. 90 Tab 3   • sennosides (SENOKOT) 8.6 MG Tab Take 17.2 mg by mouth every 12 hours.     • calcium polycarbophil (FIBERCON) 625 MG Tab Take 625 mg by mouth every morning.     • midodrine (PROAMATINE) 5 MG Tab Take 5-15 mg by mouth 1 time daily as needed.     • tacrolimus (PROGRAF) 1 MG Cap Take 1 mg by mouth every 12 hours. Pt also has an RX for 0.5MG, pt takes total of 1.5MG BID      • Probiotic Product (PROBIOTIC DAILY PO) Take 1 Cap by mouth every evening.     • pravastatin (PRAVACHOL) 20 MG Tab Take 1 Tab by mouth every day. 90 Tab 2   • docusate sodium (COLACE) 100 MG Cap Take 100 mg by mouth See Admin Instructions. Three times a day at 0700, 1200, and 1800     • tacrolimus (PROGRAF) 0.5 MG Cap Take 0.5 mg by mouth every 12 hours. Pt also has an RX for 1MG, pt takes total of 1.5MG BID     • ascorbic acid (ASCORBIC ACID) 500 MG Tab Take 500 mg by mouth every morning.     • mycophenolate (CELLCEPT) 250 MG Cap Take 500 mg by mouth every 12 hours.     • [employer] GENTLE LAXATIVE 10 MG Suppos INSERT 1 SUPPOSITORY IN RECTUM 1 TIME DAILY AS NEEDED (CONSTIPATION). 30 Suppository 3  "  • predniSONE (DELTASONE) 5 MG Tab Take 5 mg by mouth every morning. Pt also has an RX for 1 MG  pt takes total of 7MG QDAY     • predniSONE (DELTASONE) 1 MG Tab Take 2 mg by mouth every morning. Pt also has an RX for 5MG, pt takes total of 7MG QDAY     • NON SPECIFIED Take 1 Cap by mouth every bedtime. Bariatric Dietary Supplment        No current facility-administered medications on file prior to visit.      Blood pressure 128/78, pulse 68, temperature 36.8 °C (98.3 °F), temperature source Oral, resp. rate 16, height 1.727 m (5' 8\"), weight 63.5 kg (140 lb), last menstrual period 01/03/2000, SpO2 96 %, not currently breastfeeding.  Family History   Problem Relation Age of Onset   • Other Father         Unknown (dead 10 years)   • Diabetes Father    • Heart Disease Father    • Hypertension Father    • Hyperlipidemia Father    • Stroke Father    • Non-contributory Mother    • Hyperlipidemia Mother    • Alcohol/Drug Mother    • Cancer Paternal Aunt    • Alcohol/Drug Maternal Grandmother    • Alcohol/Drug Maternal Grandfather        Physical Exam:  No distress at rest on supplemental oxygen  HEENT: PERRLA, EOMI, no scleral icterus, no nasal or oral lesions  Neck: No thyromegaly, no adenopathy, no bruits  Mallampatti: Grade II  Lungs: Equal breath sounds, no wheezes or crackles  Heart: Regular rate and rhythm, no gallops or murmurs  Abdomen: Soft, benign, no organomegaly  Extremities: No clubbing, cyanosis, or edema  Neurologic: Cranial nerve, motor, and sensory exam are normal    1. Hypoxemia    2. Pulmonary hypertension (HCC)    3. Status post liver transplant Dr. Canada ([facility])    4. Sarcoidosis    5. Hepatopulmonary syndrome (HCC)    6. Immunocompromised state (HCC)    7. Obstructive sleep apnea    8. Ostium secundum type atrial septal defect, S/P percutaneous closure    9. Aspiration pneumonia of both lower lobes due to gastric secretions (HCC)    10. History of Evon-en-Y gastric bypass    11. History of " lung biopsy      Continue her current medications and supplemental oxygen.  Tadalafil and ambrisentan will be continued.  After her surgery she has had excellent control of her previous reflux and aspiration.  She has been able to reduce some of her medications as she has lost weight.  We will see her back in 6 months or sooner if there are changes.  We will slowly wean prednisone down from 7 mg a day.  She will reduced 1 mg every 2 weeks.  Increase dosage if she becomes hypotensive.  She will continue on Florinef and Midrin.  She knows to be careful with her Lasix.  She hopes to travel to [facility] again.  We filled out the paperwork for her oxygen needs.  We will see her back in 3 months or sooner if there are issues.        Electronically signed by Gwyn Gaming M.D.  on 03/16/19    No Recipients

## 2019-03-14 NOTE — TELEPHONE ENCOUNTER
VOICEMAIL  1. Caller Name: Roxana Johansson                      Call Back Number: [phone] (home)     2. Message: Patient left voicemail regarding her new steroid medication and the potential tapering of her steriod. She says she spoke to Dr. Reza who instructed her to speak to her Liver Doctor. Her Liver Doctor told her that she does not need to be taking the prednisone as is is not for her liver or her lungs, but she does need to be supervised by her PCP if she is to be tapering off the medication. She states she is having a really tough time on both steroids and would like a refill of her zofran 4 mg ODT. She is requesting a mychart message back from provider. Please advise.     3. Patient approves office to leave a detailed voicemail/MyChart message: N\A

## 2019-03-14 NOTE — TELEPHONE ENCOUNTER
Called patient and she is sleeping.  Spoke with , Otis.  She say the pulmonologist today and they are going slowly wean her prednisone.  Zofran called in for nausea.  Elisa Phillip M.D.

## 2019-03-16 NOTE — PROGRESS NOTES
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 She has a very complex history. She has a history of biopsy-proven sarcoidosis. She also has a history of cirrhosis status post liver transplantation in December 2011 with chronic immunosuppression. Her medications include mycophenolate, tacrolimus, and prednisone at 7 mg per day. She has a history of hepatopulmonary syndrome with pulmonary hypertension and possible microvascular shunting. She has had an ASD repair that was done percutaneously. She has been hospitalized multiple times for various infections including pneumonia and tracheobronchitis. There is possibility that some of these events are due to microaspiration. She is on supplemental oxygen in the range of 5-6 L/m via nasal cannula. She has a history of complex sleep apnea. She has been treated with auto SV. She has a history of pulmonary hypertension with improvement on letairis and tadalafil. She has had previous bariatric surgery.  She was hospitalized at [facility]. She had bronchoscopy and eventually a wedge biopsy of the right lung which was initially interpreted at [facility] and [facility] as consistent with bronchiolitis obliterans. She has been followed by Dr. Arango at [facility] for her sarcoidosis. Her case was presented at a multidisciplinary conference and it was felt that the pathology was not consistent with bronchiolitis obliterans or any other interstitial lung disease. She recommended to continue follow-up with pulmonary function testing and CT scanning as indicated.  A shunt evaluation at [facility] indicated a 14% shunt..     Because of the possibility of microaspiration she has been on Prilosec.   Remarkably she had had a CT scan ordered on January 19, 2018 to follow up previous infiltrates. Her outpatient scan on January 19 showed no evidence of significant pulmonary infiltrates. On January  20 the patient may have had another aspiration episode at night. She came to the emergency room with bilateral lower lobe pulmonary infiltrates and was admitted with a diagnosis of pneumonia and sepsis.  She has a history of a gastric sleeve. Because of her recurrent aspiration she had repeat surgery at [facility] by Dr. Jimenez on 3/19/18. She had a laparoscopic Evon-en-Y gastric bypass that was a conversion from her previous sleeve gastrectomy.   Since March she has lost over 30 pounds after the Evon and Y procedure.  She was hospitalized for relative hypotension necessitating reduction in some of her pulmonary hypertension medication.  Tadalafil was reduced to 20 mg per day and Lasix was reduced to 20 mg per day.  She continues on ambrisentan at 10 mg per day.  We reviewed her medications.  From a pulmonary perspective she remains on supplemental oxygen.  She has not had any further aspiration events after her surgery.   Her oxygen needs here in [place] on range of motion 5-6 L/m. When she does go to the [place] she was able to reduce her O2 supplementation to about 3 L/m. [place] [facility] she finds that she needs to increase her oxygen to about 7 L/m to maintain adequate saturations.   Today she is at baseline and is comfortable on supplemental oxygen. She is in no acute distress.     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  She was recently hospitalized with abdominal pain and found to be hypotensive.  She was treated with stress dose hydrocortisone and started on Florinef in addition to midodrine.  She has been on Lasix at times because of lower extremity edema.  She needs to be careful to avoid hypovolemia.  She is interested in decreasing her prednisone dosage.  Her prednisone is for her liver transplant and not her pulmonary disease.  She tells me that Dr. Canada feels she can be weaned off  "prednisone.  We will wean her slowly by 1 mg every 2 weeks.  She knows to increase her dosage if she once again becomes hypotensive.        Past Medical History:   Diagnosis Date   • Anemia    • Anesthesia     \"takes more to get me under, would rather be intubated\"    • Breath shortness     cont oxygen 5L (Preffered)   • Bronchitis      2016   • Cardiomegaly    • Chronic fatigue and malaise    • Cirrhosis (HCC) December 2011    Status post liver transplant at [facility] [place]   • CKD (chronic kidney disease) stage 3, GFR 30-59 ml/min ([facility])    • Diabetes ([facility])     reports hx of, resolved w/weight loss. Reports still checking bloodsugars twice daily and using insulin PRN   • Fracture of left foot    • GERD (gastroesophageal reflux disease)         • H/O Clostridium difficile infection 02-17-17    reports \"16 months ago\". Current stool sample 01-26-17 neg   • Hemorrhagic disorder ([facility])     \"low platelets\" bruises easily    • Hiatus hernia syndrome    • High cholesterol    • Hypothyroid    • Jaundice 2009   • Liver transplanted ([facility])    • Low back pain 02-17-17    and left foot, 7/10   • Mild aortic regurgitation and aortic valve sclerosis     • On home oxygen therapy     5 liters, Dr. Gaming   • Platelet disorder ([facility])     low platelets   • Pneumonia     aspiration,    • Psychiatric disorder     Mood disorder   • Pulmonary hypertension ([facility])        • S/P cholecystectomy    • Small bowel obstruction ([facility])     01-   • Splenomegaly    • VRE (vancomycin-resistant Enterococci)     02-17-17, pt declines knowledge of this       ROS:   Constitutional: Denies fevers, chills, night sweats, fatigue or weight loss  Eyes: Denies vision loss, pain, drainage, double vision  Ears, Nose, Throat: Denies earache, tinnitus, hoarseness  Cardiovascular: Denies chest pain, tightness, palpitations  Respiratory: See HPI  Sleep: See HPI  GI: She has occasional abdominal pain and has had a previous Evon-en-Y procedure  : Denies frequent " urination, hematuria, painful urination  Musculoskeletal: Denies back pain, painful joints, sore muscles  Neurological: Denies headaches, seizures  Skin: Denies rashes, color changes  Psychiatric: Denies depression or thoughts of suicide  Hematologic: Denies bleeding tendency or clotting tendency  Allergic/Immunologic: Denies rhinitis, skin sensitivity    Social History     Social History   • Marital status:      Spouse name: N/A   • Number of children: N/A   • Years of education: N/A     Occupational History   • Not on file.     Social History Main Topics   • Smoking status: Never Smoker   • Smokeless tobacco: Never Used      Comment: avoid all tobacco products   • Alcohol use No      Comment: 05/2009 quit drinking   • Drug use: No   • Sexual activity: Not on file     Other Topics Concern   • Not on file     Social History Narrative   • No narrative on file     Rhubarb; Acetaminophen; Nsaids; Organic nitrates; Other drug; and Vancomycin hcl  Current Outpatient Prescriptions on File Prior to Visit   Medication Sig Dispense Refill   • gabapentin (NEURONTIN) 600 MG tablet TAKE 1 TABLET BY MOUTH THREE TIMES A DAY 90 Tab 1   • furosemide (LASIX) 40 MG Tab Take 0.5 Tabs by mouth 1 time daily as needed. For edema or increased weight >2lbs     • ALPRAZolam (XANAX) 1 MG Tab Take 1 mg by mouth 3 times a day.     • aspirin 81 MG EC tablet TAKE 1 TAB BY MOUTH EVERY DAY. 90 Tab 4   • levothyroxine (SYNTHROID) 137 MCG Tab TAKE 1 TAB BY MOUTH EVERY MORNING ON AN EMPTY STOMACH. 90 Tab 1   • Linaclotide (LINZESS) 290 MCG Cap Take 1 Cap by mouth every day. 90 Cap 3   • omeprazole (PRILOSEC) 40 MG delayed-release capsule Take 1 Cap by mouth every day. 90 Cap 3   • traZODone (DESYREL) 50 MG Tab Take 2 Tabs by mouth every bedtime. 180 Tab 3   • lactulose 10 GM/15ML Solution TAKE 30ML BY MOUTH TWICE DAILY 1800 mL 3   • sertraline (ZOLOFT) 100 MG Tab Take 1 Tab by mouth every bedtime. 90 Tab 3   • calcium citrate 315 mg-vitamin D  200 mg (CITRACAL+D) 315-200 MG-UNIT per tablet Take 1 Tab by mouth every day. 90 Tab 3   • ambrisentan (LETAIRIS) 10 MG tablet Take 1 Tab by mouth every day. 90 Tab 3   • fluticasone (FLONASE) 50 MCG/ACT nasal spray SPRAY 1 SPRAY IN EACH NOSTRIL TWICE A DAY 48 g 3   • SPIRIVA HANDIHALER 18 MCG Cap INHALE 1 CAPSULE VIA HANDIHALER ONCE DAILY AT THE SAME TIME EVERY DAY 30 Cap 6   • Tadalafil, PAH, (ADCIRCA) 20 MG Tab Take 20 mg by mouth every bedtime. 90 Tab 3   • sennosides (SENOKOT) 8.6 MG Tab Take 17.2 mg by mouth every 12 hours.     • calcium polycarbophil (FIBERCON) 625 MG Tab Take 625 mg by mouth every morning.     • midodrine (PROAMATINE) 5 MG Tab Take 5-15 mg by mouth 1 time daily as needed.     • tacrolimus (PROGRAF) 1 MG Cap Take 1 mg by mouth every 12 hours. Pt also has an RX for 0.5MG, pt takes total of 1.5MG BID      • Probiotic Product (PROBIOTIC DAILY PO) Take 1 Cap by mouth every evening.     • pravastatin (PRAVACHOL) 20 MG Tab Take 1 Tab by mouth every day. 90 Tab 2   • docusate sodium (COLACE) 100 MG Cap Take 100 mg by mouth See Admin Instructions. Three times a day at 0700, 1200, and 1800     • tacrolimus (PROGRAF) 0.5 MG Cap Take 0.5 mg by mouth every 12 hours. Pt also has an RX for 1MG, pt takes total of 1.5MG BID     • ascorbic acid (ASCORBIC ACID) 500 MG Tab Take 500 mg by mouth every morning.     • mycophenolate (CELLCEPT) 250 MG Cap Take 500 mg by mouth every 12 hours.     • [employer] GENTLE LAXATIVE 10 MG Suppos INSERT 1 SUPPOSITORY IN RECTUM 1 TIME DAILY AS NEEDED (CONSTIPATION). 30 Suppository 3   • predniSONE (DELTASONE) 5 MG Tab Take 5 mg by mouth every morning. Pt also has an RX for 1 MG  pt takes total of 7MG QDAY     • predniSONE (DELTASONE) 1 MG Tab Take 2 mg by mouth every morning. Pt also has an RX for 5MG, pt takes total of 7MG QDAY     • NON SPECIFIED Take 1 Cap by mouth every bedtime. Bariatric Dietary Supplment        No current facility-administered medications on file prior to visit.  "     Blood pressure 128/78, pulse 68, temperature 36.8 °C (98.3 °F), temperature source Oral, resp. rate 16, height 1.727 m (5' 8\"), weight 63.5 kg (140 lb), last menstrual period 01/03/2000, SpO2 96 %, not currently breastfeeding.  Family History   Problem Relation Age of Onset   • Other Father         Unknown (dead 10 years)   • Diabetes Father    • Heart Disease Father    • Hypertension Father    • Hyperlipidemia Father    • Stroke Father    • Non-contributory Mother    • Hyperlipidemia Mother    • Alcohol/Drug Mother    • Cancer Paternal Aunt    • Alcohol/Drug Maternal Grandmother    • Alcohol/Drug Maternal Grandfather        Physical Exam:  No distress at rest on supplemental oxygen  HEENT: PERRLA, EOMI, no scleral icterus, no nasal or oral lesions  Neck: No thyromegaly, no adenopathy, no bruits  Mallampatti: Grade II  Lungs: Equal breath sounds, no wheezes or crackles  Heart: Regular rate and rhythm, no gallops or murmurs  Abdomen: Soft, benign, no organomegaly  Extremities: No clubbing, cyanosis, or edema  Neurologic: Cranial nerve, motor, and sensory exam are normal    1. Hypoxemia    2. Pulmonary hypertension (HCC)    3. Status post liver transplant Dr. Canada ([facility])    4. Sarcoidosis    5. Hepatopulmonary syndrome (HCC)    6. Immunocompromised state (HCC)    7. Obstructive sleep apnea    8. Ostium secundum type atrial septal defect, S/P percutaneous closure    9. Aspiration pneumonia of both lower lobes due to gastric secretions (HCC)    10. History of Evon-en-Y gastric bypass    11. History of lung biopsy      Continue her current medications and supplemental oxygen.  Tadalafil and ambrisentan will be continued.  After her surgery she has had excellent control of her previous reflux and aspiration.  She has been able to reduce some of her medications as she has lost weight.  We will see her back in 6 months or sooner if there are changes.  We will slowly wean prednisone down from 7 mg a day.  She " will reduced 1 mg every 2 weeks.  Increase dosage if she becomes hypotensive.  She will continue on Florinef and Midrin.  She knows to be careful with her Lasix.  She hopes to travel to [place] once again.  We filled out the paperwork for her oxygen needs.  We will see her back in 3 months or sooner if there are issues.

## 2019-03-21 NOTE — ASSESSMENT & PLAN NOTE
Patient has chronic pancytopenia with her recent labs showing  Results for VICK LI (MRN [id number]) as of 3/21/2019 08:00   Ref. Range 3/6/2019 05:02   WBC Latest Ref Range: 4.8 - 10.8 K/uL 3.5 (L)   RBC Latest Ref Range: 4.20 - 5.40 M/uL 3.54 (L)   Hemoglobin Latest Ref Range: 12.0 - 16.0 g/dL 11.3 (L)   Hematocrit Latest Ref Range: 37.0 - 47.0 % 33.9 (L)   MCV Latest Ref Range: 81.4 - 97.8 fL 95.8   MCH Latest Ref Range: 27.0 - 33.0 pg 31.9   MCHC Latest Ref Range: 33.6 - 35.0 g/dL 33.3 (L)   RDW Latest Ref Range: 35.9 - 50.0 fL 49.2   Platelet Count Latest Ref Range: 164 - 446 K/uL 64 (L)   MPV Latest Ref Range: 9.0 - 12.9 fL 8.9 (L)   She is having increased infections but denies any increased bleeding.  She has splenomegaly and this is felt to be some sequestration but her liver transplant doctor has not wanted to her to have her spleen removed.  She does have chronic splenic pain that she is working with pain management on.

## 2019-03-21 NOTE — ASSESSMENT & PLAN NOTE
Patient was recently hospitalized with an exacerbation of her adrenal insufficiency.  She is currently taking prednisone as well as Florinef.  The hospitalist wanted her weaned off her prednisone it is unclear if she is on the prednisone for the liver transplant or for her lung condition.  She is scheduled to see pulmonology in the next couple weeks.  She reports she continues to be weak and is very irritable and has difficulty sleeping on both steroids.

## 2019-03-21 NOTE — PROGRESS NOTES
Subjective:     Chief Complaint   Patient presents with   • Hospital Follow-up       Roxana Cuba is a 59 y.o. female here today for evaluation and management of:    Pancytopenia (HCC)  Patient has chronic pancytopenia with her recent labs showing  Results for ROXANA CUBA (MRN [id number]) as of 3/21/2019 08:00   Ref. Range 3/6/2019 05:02   WBC Latest Ref Range: 4.8 - 10.8 K/uL 3.5 (L)   RBC Latest Ref Range: 4.20 - 5.40 M/uL 3.54 (L)   Hemoglobin Latest Ref Range: 12.0 - 16.0 g/dL 11.3 (L)   Hematocrit Latest Ref Range: 37.0 - 47.0 % 33.9 (L)   MCV Latest Ref Range: 81.4 - 97.8 fL 95.8   MCH Latest Ref Range: 27.0 - 33.0 pg 31.9   MCHC Latest Ref Range: 33.6 - 35.0 g/dL 33.3 (L)   RDW Latest Ref Range: 35.9 - 50.0 fL 49.2   Platelet Count Latest Ref Range: 164 - 446 K/uL 64 (L)   MPV Latest Ref Range: 9.0 - 12.9 fL 8.9 (L)   She is having increased infections but denies any increased bleeding.  She has splenomegaly and this is felt to be some sequestration but her liver transplant doctor has not wanted to her to have her spleen removed.  She does have chronic splenic pain that she is working with pain management on.    Adrenal insufficiency (HCC)  Patient was recently hospitalized with an exacerbation of her adrenal insufficiency.  She is currently taking prednisone as well as Florinef.  The hospitalist wanted her weaned off her prednisone it is unclear if she is on the prednisone for the liver transplant or for her lung condition.  She is scheduled to see pulmonology in the next couple weeks.  She reports she continues to be weak and is very irritable and has difficulty sleeping on both steroids.       Allergies   Allergen Reactions   • Rhubarb Anaphylaxis   • Acetaminophen      Can not take, hx of liver transplant    • Nsaids      Can not take due to hx of liver transplant    • Organic Nitrates      Nitroglycerin products should be avoided with the use of PDE-5 inhibitors as the combination can result in  severe hypotension.  RD clarified with pt: Nitrates in food are okay and pt does not want nitrates to be listed as food allergy. Pt only avoids medications with nitrates.    • Other Drug      Any medication that may interact with cyclosporine, tacrolimus, sirolimus, or prograf (due to hx of liver transplant)    • Vancomycin Hcl      Causes red man syndrome when infused to fast         Current medicines (including changes today)  Current Outpatient Prescriptions   Medication Sig Dispense Refill   • furosemide (LASIX) 40 MG Tab Take 0.5 Tabs by mouth 1 time daily as needed. For edema or increased weight >2lbs     • ALPRAZolam (XANAX) 1 MG Tab Take 1 mg by mouth 3 times a day.     • aspirin 81 MG EC tablet TAKE 1 TAB BY MOUTH EVERY DAY. 90 Tab 4   • levothyroxine (SYNTHROID) 137 MCG Tab TAKE 1 TAB BY MOUTH EVERY MORNING ON AN EMPTY STOMACH. 90 Tab 1   • Linaclotide (LINZESS) 290 MCG Cap Take 1 Cap by mouth every day. 90 Cap 3   • omeprazole (PRILOSEC) 40 MG delayed-release capsule Take 1 Cap by mouth every day. 90 Cap 3   • traZODone (DESYREL) 50 MG Tab Take 2 Tabs by mouth every bedtime. 180 Tab 3   • lactulose 10 GM/15ML Solution TAKE 30ML BY MOUTH TWICE DAILY 1800 mL 3   • sertraline (ZOLOFT) 100 MG Tab Take 1 Tab by mouth every bedtime. 90 Tab 3   • calcium citrate 315 mg-vitamin D 200 mg (CITRACAL+D) 315-200 MG-UNIT per tablet Take 1 Tab by mouth every day. 90 Tab 3   • [facility] GENTLE LAXATIVE 10 MG Suppos INSERT 1 SUPPOSITORY IN RECTUM 1 TIME DAILY AS NEEDED (CONSTIPATION). 30 Suppository 3   • ambrisentan (LETAIRIS) 10 MG tablet Take 1 Tab by mouth every day. 90 Tab 3   • fluticasone (FLONASE) 50 MCG/ACT nasal spray SPRAY 1 SPRAY IN EACH NOSTRIL TWICE A DAY 48 g 3   • SPIRIVA HANDIHALER 18 MCG Cap INHALE 1 CAPSULE VIA HANDIHALER ONCE DAILY AT THE SAME TIME EVERY DAY 30 Cap 6   • Tadalafil, PAH, (ADCIRCA) 20 MG Tab Take 20 mg by mouth every bedtime. 90 Tab 3   • sennosides (SENOKOT) 8.6 MG Tab Take 17.2 mg by mouth  every 12 hours.     • calcium polycarbophil (FIBERCON) 625 MG Tab Take 625 mg by mouth every morning.     • midodrine (PROAMATINE) 5 MG Tab Take 5-15 mg by mouth 1 time daily as needed.     • predniSONE (DELTASONE) 5 MG Tab Take 5 mg by mouth every morning. Pt also has an RX for 1 MG  pt takes total of 7MG QDAY     • tacrolimus (PROGRAF) 1 MG Cap Take 1 mg by mouth every 12 hours. Pt also has an RX for 0.5MG, pt takes total of 1.5MG BID      • Probiotic Product (PROBIOTIC DAILY PO) Take 1 Cap by mouth every evening.     • pravastatin (PRAVACHOL) 20 MG Tab Take 1 Tab by mouth every day. 90 Tab 2   • docusate sodium (COLACE) 100 MG Cap Take 100 mg by mouth See Admin Instructions. Three times a day at 0700, 1200, and 1800     • predniSONE (DELTASONE) 1 MG Tab Take 2 mg by mouth every morning. Pt also has an RX for 5MG, pt takes total of 7MG QDAY     • NON SPECIFIED Take 1 Cap by mouth every bedtime. Bariatric Dietary Supplment      • tacrolimus (PROGRAF) 0.5 MG Cap Take 0.5 mg by mouth every 12 hours. Pt also has an RX for 1MG, pt takes total of 1.5MG BID     • ascorbic acid (ASCORBIC ACID) 500 MG Tab Take 500 mg by mouth every morning.     • mycophenolate (CELLCEPT) 250 MG Cap Take 500 mg by mouth every 12 hours.     • ondansetron (ZOFRAN ODT) 4 MG TABLET DISPERSIBLE Take 1 Tab by mouth every 8 hours as needed for Nausea. 10 Tab 1   • fludrocortisone (FLORINEF) 0.1 MG Tab Take 1 Tab by mouth every morning. 30 Tab 11   • GLUCAGON EMERGENCY 1 MG Kit      • tacrolimus (PROGRAF) 1 MG Cap Take 1 mg by mouth.     • gabapentin (NEURONTIN) 600 MG tablet TAKE 1 TABLET BY MOUTH THREE TIMES A DAY 90 Tab 1     No current facility-administered medications for this visit.        She  has a past medical history of Anemia; Anesthesia; Breath shortness; Bronchitis ( ); Cardiomegaly; Chronic fatigue and malaise; Cirrhosis (HCC) (December 2011); CKD (chronic kidney disease) stage 3, GFR 30-59 ml/min (HCC); Diabetes (HCC); Fracture of  left foot; GERD (gastroesophageal reflux disease); H/O Clostridium difficile infection (02-17-17); Hemorrhagic disorder (HCC); Hiatus hernia syndrome; High cholesterol; Hypothyroid; Jaundice (2009); Liver transplanted (HCC); Low back pain (02-17-17); Mild aortic regurgitation and aortic valve sclerosis ( ); On home oxygen therapy; Platelet disorder (HCC); Pneumonia; Psychiatric disorder; Pulmonary hypertension (HCC); S/P cholecystectomy; Small bowel obstruction (HCC); Splenomegaly; and VRE (vancomycin-resistant Enterococci).    Patient Active Problem List    Diagnosis Date Noted   • Adrenal insufficiency (HCC) 03/05/2019     Priority: High   • Pure hypercholesterolemia 12/09/2014     Priority: High   • Mild aortic regurgitation and aortic valve sclerosis 03/17/2014     Priority: High   • Immunocompromised state (HCC) 11/14/2015     Priority: Medium   • Chronic respiratory failure with hypoxia (HCC) 11/13/2014     Priority: Medium   • Vitamin D deficiency 08/26/2014     Priority: Medium   • Ostium secundum type atrial septal defect, S/P percutaneous closure 03/17/2014     Priority: Medium   • Hepatopulmonary syndrome (HCC) 03/05/2014     Priority: Medium   • CKD (chronic kidney disease) stage 3, GFR 30-59 ml/min- unclear cause, probably multifactorial 02/25/2014     Priority: Medium   • Interstitial lung disease (HCC) 11/14/2013     Priority: Medium   • MGUS (monoclonal gammopathy of unknown significance) 11/14/2013     Priority: Medium   • History of pancreatitis 06/20/2012     Priority: Medium   • Pulmonary hypertension (HCC) 02/03/2015     Priority: Low   • Sarcoidosis (HCC) 11/14/2013     Priority: Low   • DUC (generalized anxiety disorder) 11/04/2013     Priority: Low   • Primary insomnia 11/04/2013     Priority: Low   • Status post liver transplant Dr. [place] ([facility]) 03/13/2012     Priority: Low   • Acquired hypothyroidism 03/13/2012     Priority: Low   • AP (abdominal pain) 03/05/2019   • Pancytopenia  "([facility]) 03/05/2019   • Mixed obsessional thoughts and acts 01/30/2019   • Recurrent major depressive disorder, in partial remission ([facility]) 01/30/2019   • Incisional hernia, without obstruction or gangrene 01/30/2019   • History of infection with vancomycin resistant Enterococcus (VRE) 01/30/2019   • Numbness and tingling of left thumb 01/30/2019   • High risk medication use 08/28/2018   • History of aspiration pneumonia 02/06/2018   • Hiatal hernia 02/06/2018   • Thrombocytopenia ([facility]) 01/21/2018   • [place]'s disease ([facility]) 11/03/2017   • Slow transit constipation 11/03/2017   • S/P panniculectomy 10/31/2017   • Steroid-induced hyperglycemia 10/03/2017   • History of small bowel obstruction 07/14/2017   • History of Clostridium difficile infection 07/14/2017   • Chronic prescription benzodiazepine use 04/12/2017   • GERD (gastroesophageal reflux disease) 01/04/2017   • Other allergic rhinitis 07/22/2016   • Chronic pain syndrome 06/15/2016   • Back pain 02/22/2016   • Splenic lesion 11/14/2015   • Mild mitral regurgitation 07/14/2015   • Splenomegaly 07/14/2015   • On prednisone therapy 07/06/2015   • H/O non-ST elevation myocardial infarction (NSTEMI) 05/16/2013   • Primary pulmonary hypertension ([facility]) 01/13/2012       ROS   No fever or chills.  No nausea or vomiting.  No chest pain or palpitations.  No cough or SOB.  No pain with urination or hematuria.  No black or bloody stools.       Objective:     Blood pressure 126/76, pulse 75, temperature 37.8 °C (100 °F), temperature source Temporal, resp. rate 20, height 1.727 m (5' 8\"), weight 64 kg (141 lb 1.5 oz), last menstrual period 01/03/2000, SpO2 93 %, not currently breastfeeding. Body mass index is 21.45 kg/m².   Physical Exam:  Well developed, well nourished.  Alert, oriented in no acute distress.  Eye contact is good, speech goal directed, affect calm  Eyes: conjunctiva non-injected, sclera non-icteric.  Neck Supple.  No adenopathy or masses in the neck or " supraclavicular regions. No thyromegaly  Lungs: clear to auscultation bilaterally with good excursion. No wheezes or rhonchi  CV: regular rate and rhythm. No murmur  Abdomen: soft,, splenomegaly is present with tenderness to palpation of this area..  No rebound or guarding  Ext: no edema, color normal, vascularity normal, temperature normal          Assessment and Plan:   The following treatment plan was discussed    1. Adrenal insufficiency (HCC)  Patient is going to see pulmonologist tomorrow to see if we can wean the prednisone.  Otherwise consider endocrinology referral to determine the best therapy for her adrenal insufficiency.  I did stress the importance of taking both steroids at this time.    2. Pancytopenia (HCC)  I have asked patient to follow-up with her hematologist because of her sequestration is worsening the decision to remove her spleen should be discussed further.  We discussed that she could have immunizations for diseases of concern with asplenia.    Any change or worsening of signs or symptoms, patient encouraged to follow-up or report to the emergency room for further evaluation. Patient understands and agrees.    Followup: Return in about 3 months (around 6/12/2019).

## 2019-03-25 ENCOUNTER — HOSPITAL ENCOUNTER (OUTPATIENT)
Dept: RADIOLOGY | Facility: MEDICAL CENTER | Age: 59
End: 2019-03-25
Attending: FAMILY MEDICINE
Payer: MEDICARE

## 2019-03-25 DIAGNOSIS — Z12.31 VISIT FOR SCREENING MAMMOGRAM: ICD-10-CM

## 2019-03-25 PROCEDURE — 77063 BREAST TOMOSYNTHESIS BI: CPT

## 2019-04-02 ENCOUNTER — TELEPHONE (OUTPATIENT)
Dept: MEDICAL GROUP | Facility: LAB | Age: 59
End: 2019-04-02

## 2019-04-02 NOTE — TELEPHONE ENCOUNTER
1. Caller Name:                                        Call Back Number:[phone]      Patient approves a detailed voicemail message: yes     pt has an outbreak from shingles. Lip/ chin area. Asking for medication. [facility] [address],

## 2019-04-03 NOTE — TELEPHONE ENCOUNTER
Please schedule patient for an appointment with the soonest available provider. I have never seen this patient before so any provider will do.     SAIMA Thomas.

## 2019-04-04 ENCOUNTER — OFFICE VISIT (OUTPATIENT)
Dept: MEDICAL GROUP | Facility: LAB | Age: 59
End: 2019-04-04
Payer: MEDICARE

## 2019-04-04 VITALS
TEMPERATURE: 98.5 F | HEIGHT: 68 IN | SYSTOLIC BLOOD PRESSURE: 122 MMHG | WEIGHT: 147.05 LBS | HEART RATE: 75 BPM | OXYGEN SATURATION: 94 % | DIASTOLIC BLOOD PRESSURE: 66 MMHG | BODY MASS INDEX: 22.29 KG/M2 | RESPIRATION RATE: 20 BRPM

## 2019-04-04 DIAGNOSIS — B00.1 COLD SORE: ICD-10-CM

## 2019-04-04 DIAGNOSIS — R11.0 CHRONIC NAUSEA: ICD-10-CM

## 2019-04-04 DIAGNOSIS — Z94.4 STATUS POST LIVER TRANSPLANT (HCC): ICD-10-CM

## 2019-04-04 DIAGNOSIS — Z98.84 S/P BARIATRIC SURGERY: ICD-10-CM

## 2019-04-04 PROCEDURE — 99214 OFFICE O/P EST MOD 30 MIN: CPT | Performed by: FAMILY MEDICINE

## 2019-04-04 RX ORDER — ONDANSETRON 4 MG/1
4 TABLET, ORALLY DISINTEGRATING ORAL EVERY 8 HOURS PRN
Qty: 60 TAB | Refills: 2 | Status: ON HOLD | OUTPATIENT
Start: 2019-04-04 | End: 2021-08-03

## 2019-04-04 RX ORDER — VALACYCLOVIR HYDROCHLORIDE 1 G/1
2000 TABLET, FILM COATED ORAL 2 TIMES DAILY
Qty: 8 TAB | Refills: 2 | Status: SHIPPED | OUTPATIENT
Start: 2019-04-04 | End: 2019-04-05

## 2019-04-04 NOTE — ASSESSMENT & PLAN NOTE
Patient developed a cold sore on her right lower lip that started 3 days ago.  She has never had one before.  She recently had shingles but not in this area.  She reports it is painful and seems to be enlarging.

## 2019-04-04 NOTE — PATIENT INSTRUCTIONS
Cold Sore  A cold sore (fever blister) is a skin infection caused by the herpes simplex virus (HSV-1). HSV-1 is closely related to the virus that causes genital herpes (HSV-2), but they are not the same even though both viruses can cause oral and genital infections. Cold sores are small, fluid-filled sores inside of the mouth or on the lips, gums, nose, chin, cheeks, or fingers.   The herpes simplex virus can be easily passed (contagious) to other people through close personal contact, such as kissing or sharing personal items. The virus can also spread to other parts of the body, such as the eyes or genitals. Cold sores are contagious until the sores crust over completely. They often heal within 2 weeks.   Once a person is infected, the herpes simplex virus remains permanently in the body. Therefore, there is no cure for cold sores, and they often recur when a person is tired, stressed, sick, or gets too much sun. Additional factors that can cause a recurrence include hormone changes in menstruation or pregnancy, certain drugs, and cold weather.   CAUSES   Cold sores are caused by the herpes simplex virus. The virus is spread from person to person through close contact, such as through kissing, touching the affected area, or sharing personal items such as lip balm, razors, or eating utensils.   SYMPTOMS   The first infection may not cause symptoms. If symptoms develop, the symptoms often go through different stages. Here is how a cold sore develops:   · Tingling, itching, or burning is felt 1-2 days before the outbreak.    · Fluid-filled blisters appear on the lips, inside the mouth, nose, or on the cheeks.    · The blisters start to ooze clear fluid.    · The blisters dry up and a yellow crust appears in its place.    · The crust falls off.    Symptoms depend on whether it is the initial outbreak or a recurrence. Some other symptoms with the first outbreak may include:   · Fever.    · Sore throat.    · Headache.     · Muscle aches.    · Swollen neck glands.    DIAGNOSIS   A diagnosis is often made based on your symptoms and looking at the sores. Sometimes, a sore may be swabbed and then examined in the lab to make a final diagnosis. If the sores are not present, blood tests can find the herpes simplex virus.   TREATMENT   There is no cure for cold sores and no vaccine for the herpes simplex virus. Within 2 weeks, most cold sores go away on their own without treatment. Medicines cannot make the infection go away, but medicine can help relieve some of the pain associated with the sores, can work to stop the virus from multiplying, and can also shorten healing time. Medicine may be in the form of creams, gels, pills, or a shot.   HOME CARE INSTRUCTIONS   · Only take over-the-counter or prescription medicines for pain, discomfort, or fever as directed by your caregiver. Do not use aspirin.    · Use a cotton-tip swab to apply creams or gels to your sores.    · Do not touch the sores or pick the scabs. Wash your hands often. Do not touch your eyes without washing your hands first.    · Avoid kissing, oral sex, and sharing personal items until sores heal.    · Apply an ice pack on your sores for 10-15 minutes to ease any discomfort.    · Avoid hot, cold, or salty foods because they may hurt your mouth. Eat a soft, bland diet to avoid irritating the sores. Use a straw to drink if you have pain when drinking out of a glass.    · Keep sores clean and dry to prevent an infection of other tissues.    · Avoid the sun and limit stress if these things trigger outbreaks. If sun causes cold sores, apply sunscreen on the lips before being out in the sun.    SEEK MEDICAL CARE IF:   · You have a fever or persistent symptoms for more than 2-3 days.    · You have a fever and your symptoms suddenly get worse.    · You have pus, not clear fluid, coming from the sores.    · You have redness that is spreading.    · You have pain or irritation in your  eye.    · You get sores on your genitals.    · Your sores do not heal within 2 weeks.    · You have a weakened immune system.    · You have frequent recurrences of cold sores.    MAKE SURE YOU:   · Understand these instructions.  · Will watch your condition.  · Will get help right away if you are not doing well or get worse.  This information is not intended to replace advice given to you by your health care provider. Make sure you discuss any questions you have with your health care provider.  Document Released: 12/15/2001 Document Revised: 01/08/2016 Document Reviewed: 10/07/2016  Elsevier Interactive Patient Education © 2017 [employer]

## 2019-04-05 ENCOUNTER — PATIENT OUTREACH (OUTPATIENT)
Dept: HEALTH INFORMATION MANAGEMENT | Facility: OTHER | Age: 59
End: 2019-04-05

## 2019-04-08 NOTE — PROGRESS NOTES
Patient Roxana Cuba discharged on 3/06/19. Upon discharge, the patient was instructed to follow up with her PCP. [facility] made multiple outreach calls to the patient, emergency contacts, and pharmacy, however, was unable to successfully reach the patient. [facility] continued to monitor patient throughout the case and confirmed they kept two PCP appointments, one pulmonology appointment, and one imaging appointment. Patient is scheduled for a lab draws on 4/16/19, 5/28/19, and 8/12/19, a PCP appointment on 5/23/19, an imaging appointment on 5/28/19, a cardiology appointment on 5/29/19, and a pulmonology appointment on 8/23/19. [facility] also confirmed the patient picked up all of her new prescriptions from her pharmacy. The patient has been provided with my contact information in the event they would like assistance in the future. Patient was discharged with a high LACE score, however, a PPS screening was unable to be conducted due to lack of contact.

## 2019-04-09 NOTE — PROGRESS NOTES
Subjective:     Chief Complaint   Patient presents with   • Cold Sores       Roxana Cuba is a 59 y.o. female here today for evaluation and management of:    Cold sore  Patient developed a cold sore on her right lower lip that started 3 days ago.  She has never had one before.  She recently had shingles but not in this area.  She reports it is painful and seems to be enlarging.    Chronic nausea  Patient is using the Zofran up to 3 times a day for her chronic anemia.  This has been chronic since bariatric surgery.       Allergies   Allergen Reactions   • Rhubarb Anaphylaxis   • Acetaminophen      Can not take, hx of liver transplant    • Nsaids      Can not take due to hx of liver transplant    • Organic Nitrates      Nitroglycerin products should be avoided with the use of PDE-5 inhibitors as the combination can result in severe hypotension.  RD clarified with pt: Nitrates in food are okay and pt does not want nitrates to be listed as food allergy. Pt only avoids medications with nitrates.    • Other Drug      Any medication that may interact with cyclosporine, tacrolimus, sirolimus, or prograf (due to hx of liver transplant)    • Vancomycin Hcl      Causes red man syndrome when infused to fast         Current medicines (including changes today)  Current Outpatient Prescriptions   Medication Sig Dispense Refill   • ondansetron (ZOFRAN ODT) 4 MG TABLET DISPERSIBLE Take 1 Tab by mouth every 8 hours as needed for Nausea. 60 Tab 2   • fludrocortisone (FLORINEF) 0.1 MG Tab Take 1 Tab by mouth every morning. 30 Tab 11   • GLUCAGON EMERGENCY 1 MG Kit      • tacrolimus (PROGRAF) 1 MG Cap Take 1 mg by mouth.     • gabapentin (NEURONTIN) 600 MG tablet TAKE 1 TABLET BY MOUTH THREE TIMES A DAY 90 Tab 1   • furosemide (LASIX) 40 MG Tab Take 0.5 Tabs by mouth 1 time daily as needed. For edema or increased weight >2lbs     • ALPRAZolam (XANAX) 1 MG Tab Take 1 mg by mouth 3 times a day.     • aspirin 81 MG EC tablet TAKE 1 TAB  BY MOUTH EVERY DAY. 90 Tab 4   • levothyroxine (SYNTHROID) 137 MCG Tab TAKE 1 TAB BY MOUTH EVERY MORNING ON AN EMPTY STOMACH. 90 Tab 1   • Linaclotide (LINZESS) 290 MCG Cap Take 1 Cap by mouth every day. 90 Cap 3   • omeprazole (PRILOSEC) 40 MG delayed-release capsule Take 1 Cap by mouth every day. 90 Cap 3   • traZODone (DESYREL) 50 MG Tab Take 2 Tabs by mouth every bedtime. 180 Tab 3   • lactulose 10 GM/15ML Solution TAKE 30ML BY MOUTH TWICE DAILY 1800 mL 3   • sertraline (ZOLOFT) 100 MG Tab Take 1 Tab by mouth every bedtime. 90 Tab 3   • calcium citrate 315 mg-vitamin D 200 mg (CITRACAL+D) 315-200 MG-UNIT per tablet Take 1 Tab by mouth every day. 90 Tab 3   • [employer] GENTLE LAXATIVE 10 MG Suppos INSERT 1 SUPPOSITORY IN RECTUM 1 TIME DAILY AS NEEDED (CONSTIPATION). 30 Suppository 3   • ambrisentan (LETAIRIS) 10 MG tablet Take 1 Tab by mouth every day. 90 Tab 3   • fluticasone (FLONASE) 50 MCG/ACT nasal spray SPRAY 1 SPRAY IN EACH NOSTRIL TWICE A DAY 48 g 3   • SPIRIVA HANDIHALER 18 MCG Cap INHALE 1 CAPSULE VIA HANDIHALER ONCE DAILY AT THE SAME TIME EVERY DAY 30 Cap 6   • Tadalafil, PAH, (ADCIRCA) 20 MG Tab Take 20 mg by mouth every bedtime. 90 Tab 3   • sennosides (SENOKOT) 8.6 MG Tab Take 17.2 mg by mouth every 12 hours.     • calcium polycarbophil (FIBERCON) 625 MG Tab Take 625 mg by mouth every morning.     • midodrine (PROAMATINE) 5 MG Tab Take 5-15 mg by mouth 1 time daily as needed.     • predniSONE (DELTASONE) 5 MG Tab Take 5 mg by mouth every morning. Pt also has an RX for 1 MG  pt takes total of 7MG QDAY     • tacrolimus (PROGRAF) 1 MG Cap Take 1 mg by mouth every 12 hours. Pt also has an RX for 0.5MG, pt takes total of 1.5MG BID      • Probiotic Product (PROBIOTIC DAILY PO) Take 1 Cap by mouth every evening.     • pravastatin (PRAVACHOL) 20 MG Tab Take 1 Tab by mouth every day. 90 Tab 2   • docusate sodium (COLACE) 100 MG Cap Take 100 mg by mouth See Admin Instructions. Three times a day at 0700, 1200, and  1800     • predniSONE (DELTASONE) 1 MG Tab Take 2 mg by mouth every morning. Pt also has an RX for 5MG, pt takes total of 7MG QDAY     • NON SPECIFIED Take 1 Cap by mouth every bedtime. Bariatric Dietary Supplment      • tacrolimus (PROGRAF) 0.5 MG Cap Take 0.5 mg by mouth every 12 hours. Pt also has an RX for 1MG, pt takes total of 1.5MG BID     • ascorbic acid (ASCORBIC ACID) 500 MG Tab Take 500 mg by mouth every morning.     • mycophenolate (CELLCEPT) 250 MG Cap Take 500 mg by mouth every 12 hours.       No current facility-administered medications for this visit.        She  has a past medical history of Anemia; Anesthesia; Breath shortness; Bronchitis ( ); Cardiomegaly; Chronic fatigue and malaise; Cirrhosis ([facility]) (December 2011); CKD (chronic kidney disease) stage 3, GFR 30-59 ml/min ([facility]); Diabetes ([facility]); Fracture of left foot; GERD (gastroesophageal reflux disease); H/O Clostridium difficile infection (02-17-17); Hemorrhagic disorder ([facility]); Hiatus hernia syndrome; High cholesterol; Hypothyroid; Jaundice (2009); Liver transplanted ([facility]); Low back pain (02-17-17); Mild aortic regurgitation and aortic valve sclerosis ( ); On home oxygen therapy; Platelet disorder ([facility]); Pneumonia; Psychiatric disorder; Pulmonary hypertension ([facility]); S/P cholecystectomy; Small bowel obstruction ([facility]); Splenomegaly; and VRE (vancomycin-resistant Enterococci).    Patient Active Problem List    Diagnosis Date Noted   • Adrenal insufficiency ([facility]) 03/05/2019     Priority: High   • Pure hypercholesterolemia 12/09/2014     Priority: High   • Mild aortic regurgitation and aortic valve sclerosis 03/17/2014     Priority: High   • Immunocompromised state ([facility]) 11/14/2015     Priority: Medium   • Chronic respiratory failure with hypoxia ([facility]) 11/13/2014     Priority: Medium   • Vitamin D deficiency 08/26/2014     Priority: Medium   • Ostium secundum type atrial septal defect, S/P percutaneous closure 03/17/2014     Priority: Medium   •  Hepatopulmonary syndrome (HCC) 03/05/2014     Priority: Medium   • CKD (chronic kidney disease) stage 3, GFR 30-59 ml/min- unclear cause, probably multifactorial 02/25/2014     Priority: Medium   • Interstitial lung disease (HCC) 11/14/2013     Priority: Medium   • MGUS (monoclonal gammopathy of unknown significance) 11/14/2013     Priority: Medium   • History of pancreatitis 06/20/2012     Priority: Medium   • Pulmonary hypertension (HCC) 02/03/2015     Priority: Low   • Sarcoidosis (HCC) 11/14/2013     Priority: Low   • DUC (generalized anxiety disorder) 11/04/2013     Priority: Low   • Status post liver transplant Dr. Canada ([facility]) 03/13/2012     Priority: Low   • Acquired hypothyroidism 03/13/2012     Priority: Low   • Cold sore 04/04/2019   • AP (abdominal pain) 03/05/2019   • Pancytopenia (HCC) 03/05/2019   • Mixed obsessional thoughts and acts 01/30/2019   • Recurrent major depressive disorder, in partial remission ([facility]) 01/30/2019   • Incisional hernia, without obstruction or gangrene 01/30/2019   • History of infection with vancomycin resistant Enterococcus (VRE) 01/30/2019   • Numbness and tingling of left thumb 01/30/2019   • High risk medication use 08/28/2018   • History of aspiration pneumonia 02/06/2018   • Hiatal hernia 02/06/2018   • Chronic nausea 01/25/2018   • Thrombocytopenia ([facility]) 01/21/2018   • 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[facility]) 11/03/2017   • Slow transit constipation 11/03/2017   • S/P bariatric surgery 10/31/2017   • Steroid-induced hyperglycemia 10/03/2017   • History of small bowel obstruction 07/14/2017   • History of Clostridium difficile infection 07/14/2017   • Chronic prescription benzodiazepine use 04/12/2017   • GERD (gastroesophageal reflux disease) 01/04/2017   • Other allergic rhinitis 07/22/2016   • Chronic pain syndrome 06/15/2016   • Back pain 02/22/2016   • Splenic lesion 11/14/2015   • Mild mitral regurgitation 07/14/2015   • Splenomegaly 07/14/2015   • On prednisone therapy  "07/06/2015   • H/O non-ST elevation myocardial infarction (NSTEMI) 05/16/2013   • Primary pulmonary hypertension (HCC) 01/13/2012       ROS   No fever or chills.  No nausea or vomiting.  No chest pain or palpitations.  No cough or SOB.  No pain with urination or hematuria.  No black or bloody stools.       Objective:     /66 (BP Location: Left arm, Patient Position: Sitting, BP Cuff Size: Adult)   Pulse 75   Temp 36.9 °C (98.5 °F) (Temporal)   Resp 20   Ht 1.727 m (5' 8\")   Wt 66.7 kg (147 lb 0.8 oz)   SpO2 94%  Body mass index is 22.36 kg/m².   Physical Exam:  Well developed, well nourished.  Alert, oriented in no acute distress.  Eye contact is good, speech goal directed, affect calm  Eyes: conjunctiva non-injected, sclera non-icteric.  Ears: Pinna normal. TM pearly gray.   Nose: Nares are patent.  Normal mucosa  Mouth: Oral mucous membranes pink and moist.  Vesicular lesion on right lower lip with surrounding erythema  Neck Supple.  No adenopathy or masses in the neck or supraclavicular regions. No thyromegaly  Lungs: clear to auscultation bilaterally with good excursion. No wheezes or rhonchi  CV: regular rate and rhythm. No murmur  Abdomen: soft, nontender, no masses or organomegaly.  No rebound or guarding          Assessment and Plan:   The following treatment plan was discussed    1. Cold sore  Valtrex 2 g 12 hours apart for 2 doses.  Refills given  - valacyclovir (VALTREX) 1 GM Tab; Take 2 Tabs by mouth 2 times a day for 2 doses.  Dispense: 8 Tab; Refill: 2    2. Status post liver transplant Dr. Canada ([facility])  Refill Zofran for as needed use.  Did discuss that this can cause constipation which she is being treated for.  - ondansetron (ZOFRAN ODT) 4 MG TABLET DISPERSIBLE; Take 1 Tab by mouth every 8 hours as needed for Nausea.  Dispense: 60 Tab; Refill: 2    3. S/P bariatric surgery  Refill Zofran for as needed use.  Did discuss that this can cause constipation which she is being treated " for  - ondansetron (ZOFRAN ODT) 4 MG TABLET DISPERSIBLE; Take 1 Tab by mouth every 8 hours as needed for Nausea.  Dispense: 60 Tab; Refill: 2    4. Chronic nausea  Refill Zofran for as needed use.  Did discuss that this can cause constipation which she is being treated for  - ondansetron (ZOFRAN ODT) 4 MG TABLET DISPERSIBLE; Take 1 Tab by mouth every 8 hours as needed for Nausea.  Dispense: 60 Tab; Refill: 2    Any change or worsening of signs or symptoms, patient encouraged to follow-up or report to the emergency room for further evaluation. Patient understands and agrees.    Followup: Return in about 3 months (around 7/4/2019).

## 2019-04-09 NOTE — ASSESSMENT & PLAN NOTE
Patient is using the Zofran up to 3 times a day for her chronic anemia.  This has been chronic since bariatric surgery.

## 2019-04-11 ENCOUNTER — TELEPHONE (OUTPATIENT)
Dept: CARDIOLOGY | Facility: MEDICAL CENTER | Age: 59
End: 2019-04-11

## 2019-04-11 NOTE — TELEPHONE ENCOUNTER
PAR sent to plan:  Roxana Cuba (Key: [id number])   Need help? Call us at [phone]     Outcome   N/A   Next Steps   The plan will fax you a determination, typically within 1 to 5 business days.  How do I follow up?   DrugLetairis 10MG tablets   [facility] Medicare Prescription Drug Coverage FormPrior Authorization From for Medicare Prescription Drug Coverage [phone]

## 2019-04-16 ENCOUNTER — HOSPITAL ENCOUNTER (OUTPATIENT)
Dept: LAB | Facility: MEDICAL CENTER | Age: 59
End: 2019-04-16
Attending: INTERNAL MEDICINE
Payer: MEDICARE

## 2019-04-16 LAB
ALBUMIN SERPL BCP-MCNC: 3.4 G/DL (ref 3.2–4.9)
ALBUMIN SERPL BCP-MCNC: 3.4 G/DL (ref 3.2–4.9)
ALBUMIN/GLOB SERPL: 2.4 G/DL
ALBUMIN/GLOB SERPL: 2.4 G/DL
ALP SERPL-CCNC: 30 U/L (ref 30–99)
ALP SERPL-CCNC: 30 U/L (ref 30–99)
ALT SERPL-CCNC: 26 U/L (ref 2–50)
ALT SERPL-CCNC: 27 U/L (ref 2–50)
ANION GAP SERPL CALC-SCNC: 6 MMOL/L (ref 0–11.9)
ANION GAP SERPL CALC-SCNC: 6 MMOL/L (ref 0–11.9)
APPEARANCE UR: CLEAR
AST SERPL-CCNC: 37 U/L (ref 12–45)
AST SERPL-CCNC: 37 U/L (ref 12–45)
BACTERIA #/AREA URNS HPF: NEGATIVE /HPF
BASOPHILS # BLD AUTO: 0.4 % (ref 0–1.8)
BASOPHILS # BLD AUTO: 2.1 % (ref 0–1.8)
BASOPHILS # BLD: 0.01 K/UL (ref 0–0.12)
BASOPHILS # BLD: 0.05 K/UL (ref 0–0.12)
BILIRUB SERPL-MCNC: 0.4 MG/DL (ref 0.1–1.5)
BILIRUB SERPL-MCNC: 0.4 MG/DL (ref 0.1–1.5)
BILIRUB UR QL STRIP.AUTO: NEGATIVE
BUN SERPL-MCNC: 11 MG/DL (ref 8–22)
BUN SERPL-MCNC: 11 MG/DL (ref 8–22)
CALCIUM SERPL-MCNC: 8.2 MG/DL (ref 8.5–10.5)
CALCIUM SERPL-MCNC: 8.4 MG/DL (ref 8.5–10.5)
CHLORIDE SERPL-SCNC: 109 MMOL/L (ref 96–112)
CHLORIDE SERPL-SCNC: 109 MMOL/L (ref 96–112)
CO2 SERPL-SCNC: 31 MMOL/L (ref 20–33)
CO2 SERPL-SCNC: 31 MMOL/L (ref 20–33)
COLOR UR: YELLOW
CREAT SERPL-MCNC: 0.79 MG/DL (ref 0.5–1.4)
CREAT SERPL-MCNC: 0.83 MG/DL (ref 0.5–1.4)
CREAT UR-MCNC: 106.9 MG/DL
EOSINOPHIL # BLD AUTO: 0.09 K/UL (ref 0–0.51)
EOSINOPHIL # BLD AUTO: 0.13 K/UL (ref 0–0.51)
EOSINOPHIL NFR BLD: 3.9 % (ref 0–6.9)
EOSINOPHIL NFR BLD: 5.4 % (ref 0–6.9)
EPI CELLS #/AREA URNS HPF: NEGATIVE /HPF
ERYTHROCYTE [DISTWIDTH] IN BLOOD BY AUTOMATED COUNT: 49 FL (ref 35.9–50)
ERYTHROCYTE [DISTWIDTH] IN BLOOD BY AUTOMATED COUNT: 49.1 FL (ref 35.9–50)
GGT SERPL-CCNC: 12 U/L (ref 7–34)
GLOBULIN SER CALC-MCNC: 1.4 G/DL (ref 1.9–3.5)
GLOBULIN SER CALC-MCNC: 1.4 G/DL (ref 1.9–3.5)
GLUCOSE SERPL-MCNC: 74 MG/DL (ref 65–99)
GLUCOSE SERPL-MCNC: 76 MG/DL (ref 65–99)
GLUCOSE UR STRIP.AUTO-MCNC: NEGATIVE MG/DL
HCT VFR BLD AUTO: 34.8 % (ref 37–47)
HCT VFR BLD AUTO: 35.2 % (ref 37–47)
HGB BLD-MCNC: 11.5 G/DL (ref 12–16)
HGB BLD-MCNC: 11.6 G/DL (ref 12–16)
HYALINE CASTS #/AREA URNS LPF: NORMAL /LPF
IMM GRANULOCYTES # BLD AUTO: 0.01 K/UL (ref 0–0.11)
IMM GRANULOCYTES # BLD AUTO: 0.01 K/UL (ref 0–0.11)
IMM GRANULOCYTES NFR BLD AUTO: 0.4 % (ref 0–0.9)
IMM GRANULOCYTES NFR BLD AUTO: 0.4 % (ref 0–0.9)
KETONES UR STRIP.AUTO-MCNC: NEGATIVE MG/DL
LEUKOCYTE ESTERASE UR QL STRIP.AUTO: ABNORMAL
LYMPHOCYTES # BLD AUTO: 0.67 K/UL (ref 1–4.8)
LYMPHOCYTES # BLD AUTO: 0.68 K/UL (ref 1–4.8)
LYMPHOCYTES NFR BLD: 28.5 % (ref 22–41)
LYMPHOCYTES NFR BLD: 29.4 % (ref 22–41)
MAGNESIUM SERPL-MCNC: 1.7 MG/DL (ref 1.5–2.5)
MCH RBC QN AUTO: 31.9 PG (ref 27–33)
MCH RBC QN AUTO: 32.5 PG (ref 27–33)
MCHC RBC AUTO-ENTMCNC: 32.7 G/DL (ref 33.6–35)
MCHC RBC AUTO-ENTMCNC: 33.3 G/DL (ref 33.6–35)
MCV RBC AUTO: 97.5 FL (ref 81.4–97.8)
MCV RBC AUTO: 97.8 FL (ref 81.4–97.8)
MICRO URNS: ABNORMAL
MONOCYTES # BLD AUTO: 0.2 K/UL (ref 0–0.85)
MONOCYTES # BLD AUTO: 0.22 K/UL (ref 0–0.85)
MONOCYTES NFR BLD AUTO: 8.8 % (ref 0–13.4)
MONOCYTES NFR BLD AUTO: 9.2 % (ref 0–13.4)
NEUTROPHILS # BLD AUTO: 1.3 K/UL (ref 2–7.15)
NEUTROPHILS # BLD AUTO: 1.3 K/UL (ref 2–7.15)
NEUTROPHILS NFR BLD: 54.4 % (ref 44–72)
NEUTROPHILS NFR BLD: 57.1 % (ref 44–72)
NITRITE UR QL STRIP.AUTO: NEGATIVE
NRBC # BLD AUTO: 0 K/UL
NRBC # BLD AUTO: 0 K/UL
NRBC BLD-RTO: 0 /100 WBC
NRBC BLD-RTO: 0 /100 WBC
PH UR STRIP.AUTO: 6 [PH]
PHOSPHATE SERPL-MCNC: 2.8 MG/DL (ref 2.5–4.5)
PLATELET # BLD AUTO: 90 K/UL (ref 164–446)
PLATELET # BLD AUTO: 92 K/UL (ref 164–446)
PMV BLD AUTO: 8.8 FL (ref 9–12.9)
PMV BLD AUTO: 9.4 FL (ref 9–12.9)
POTASSIUM SERPL-SCNC: 3.7 MMOL/L (ref 3.6–5.5)
POTASSIUM SERPL-SCNC: 3.7 MMOL/L (ref 3.6–5.5)
PROT SERPL-MCNC: 4.8 G/DL (ref 6–8.2)
PROT SERPL-MCNC: 4.8 G/DL (ref 6–8.2)
PROT UR QL STRIP: NEGATIVE MG/DL
PROT UR-MCNC: 10 MG/DL (ref 0–15)
PROT/CREAT UR: 94 MG/G (ref 10–107)
RBC # BLD AUTO: 3.57 M/UL (ref 4.2–5.4)
RBC # BLD AUTO: 3.6 M/UL (ref 4.2–5.4)
RBC # URNS HPF: NORMAL /HPF
RBC UR QL AUTO: NEGATIVE
SODIUM SERPL-SCNC: 146 MMOL/L (ref 135–145)
SODIUM SERPL-SCNC: 146 MMOL/L (ref 135–145)
SP GR UR STRIP.AUTO: 1.02
UROBILINOGEN UR STRIP.AUTO-MCNC: 0.2 MG/DL
WBC # BLD AUTO: 2.3 K/UL (ref 4.8–10.8)
WBC # BLD AUTO: 2.4 K/UL (ref 4.8–10.8)
WBC #/AREA URNS HPF: NORMAL /HPF

## 2019-04-16 PROCEDURE — 82977 ASSAY OF GGT: CPT

## 2019-04-16 PROCEDURE — 82570 ASSAY OF URINE CREATININE: CPT

## 2019-04-16 PROCEDURE — 80197 ASSAY OF TACROLIMUS: CPT

## 2019-04-16 PROCEDURE — 80053 COMPREHEN METABOLIC PANEL: CPT | Mod: 91

## 2019-04-16 PROCEDURE — 80053 COMPREHEN METABOLIC PANEL: CPT

## 2019-04-16 PROCEDURE — 84100 ASSAY OF PHOSPHORUS: CPT

## 2019-04-16 PROCEDURE — 84156 ASSAY OF PROTEIN URINE: CPT

## 2019-04-16 PROCEDURE — 85025 COMPLETE CBC W/AUTO DIFF WBC: CPT | Mod: 91

## 2019-04-16 PROCEDURE — 36415 COLL VENOUS BLD VENIPUNCTURE: CPT

## 2019-04-16 PROCEDURE — 81001 URINALYSIS AUTO W/SCOPE: CPT

## 2019-04-16 PROCEDURE — 83735 ASSAY OF MAGNESIUM: CPT

## 2019-04-16 PROCEDURE — 85025 COMPLETE CBC W/AUTO DIFF WBC: CPT

## 2019-04-18 LAB — TACROLIMUS BLD-MCNC: 2.1 NG/ML

## 2019-04-25 DIAGNOSIS — E78.01 FAMILIAL HYPERCHOLESTEROLEMIA: ICD-10-CM

## 2019-04-25 RX ORDER — PRAVASTATIN SODIUM 20 MG
20 TABLET ORAL DAILY
Qty: 100 TAB | Refills: 2 | Status: SHIPPED | OUTPATIENT
Start: 2019-04-25 | End: 2019-05-01 | Stop reason: SDUPTHER

## 2019-05-01 ENCOUNTER — TELEPHONE (OUTPATIENT)
Dept: MEDICAL GROUP | Facility: LAB | Age: 59
End: 2019-05-01

## 2019-05-01 ENCOUNTER — OFFICE VISIT (OUTPATIENT)
Dept: CARDIOLOGY | Facility: MEDICAL CENTER | Age: 59
End: 2019-05-01
Payer: MEDICARE

## 2019-05-01 VITALS
BODY MASS INDEX: 21.52 KG/M2 | DIASTOLIC BLOOD PRESSURE: 70 MMHG | SYSTOLIC BLOOD PRESSURE: 128 MMHG | WEIGHT: 142 LBS | OXYGEN SATURATION: 97 % | HEART RATE: 70 BPM | HEIGHT: 68 IN

## 2019-05-01 DIAGNOSIS — E27.40 ADRENAL INSUFFICIENCY (HCC): ICD-10-CM

## 2019-05-01 DIAGNOSIS — K76.81 HEPATOPULMONARY SYNDROME (HCC): ICD-10-CM

## 2019-05-01 DIAGNOSIS — E78.01 FAMILIAL HYPERCHOLESTEROLEMIA: ICD-10-CM

## 2019-05-01 DIAGNOSIS — N18.30 CKD (CHRONIC KIDNEY DISEASE) STAGE 3, GFR 30-59 ML/MIN (HCC): ICD-10-CM

## 2019-05-01 DIAGNOSIS — I35.1 MILD AORTIC REGURGITATION: ICD-10-CM

## 2019-05-01 DIAGNOSIS — E27.1 ADDISON'S DISEASE (HCC): ICD-10-CM

## 2019-05-01 DIAGNOSIS — I27.0 PRIMARY PULMONARY HYPERTENSION (HCC): ICD-10-CM

## 2019-05-01 DIAGNOSIS — D86.9 SARCOIDOSIS: ICD-10-CM

## 2019-05-01 DIAGNOSIS — E78.00 PURE HYPERCHOLESTEROLEMIA: ICD-10-CM

## 2019-05-01 DIAGNOSIS — I34.0 MILD MITRAL REGURGITATION: ICD-10-CM

## 2019-05-01 DIAGNOSIS — R16.1 SPLENOMEGALY: ICD-10-CM

## 2019-05-01 DIAGNOSIS — D61.818 PANCYTOPENIA (HCC): ICD-10-CM

## 2019-05-01 DIAGNOSIS — Q21.11 OSTIUM SECUNDUM TYPE ATRIAL SEPTAL DEFECT: ICD-10-CM

## 2019-05-01 DIAGNOSIS — Z79.899 HIGH RISK MEDICATION USE: ICD-10-CM

## 2019-05-01 DIAGNOSIS — D47.2 MGUS (MONOCLONAL GAMMOPATHY OF UNKNOWN SIGNIFICANCE): ICD-10-CM

## 2019-05-01 DIAGNOSIS — Z94.4 STATUS POST LIVER TRANSPLANT (HCC): ICD-10-CM

## 2019-05-01 DIAGNOSIS — J84.9 INTERSTITIAL LUNG DISEASE (HCC): ICD-10-CM

## 2019-05-01 DIAGNOSIS — G89.4 CHRONIC PAIN SYNDROME: ICD-10-CM

## 2019-05-01 DIAGNOSIS — I27.20 PULMONARY HYPERTENSION (HCC): ICD-10-CM

## 2019-05-01 DIAGNOSIS — D69.6 THROMBOCYTOPENIA (HCC): ICD-10-CM

## 2019-05-01 DIAGNOSIS — I25.2 H/O NON-ST ELEVATION MYOCARDIAL INFARCTION (NSTEMI): ICD-10-CM

## 2019-05-01 PROCEDURE — 99214 OFFICE O/P EST MOD 30 MIN: CPT | Performed by: INTERNAL MEDICINE

## 2019-05-01 RX ORDER — FUROSEMIDE 40 MG/1
20 TABLET ORAL
Qty: 90 TAB | Refills: 3 | Status: ON HOLD | OUTPATIENT
Start: 2019-05-01 | End: 2021-08-03

## 2019-05-01 RX ORDER — MIDODRINE HYDROCHLORIDE 10 MG/1
10 TABLET ORAL
Qty: 90 TAB | Refills: 3 | Status: ON HOLD | OUTPATIENT
Start: 2019-05-01 | End: 2021-08-03

## 2019-05-01 RX ORDER — PRAVASTATIN SODIUM 20 MG
20 TABLET ORAL DAILY
Qty: 90 TAB | Refills: 3 | Status: ON HOLD | OUTPATIENT
Start: 2019-05-01 | End: 2021-08-03

## 2019-05-01 ASSESSMENT — ENCOUNTER SYMPTOMS
MUSCULOSKELETAL NEGATIVE: 1
SPUTUM PRODUCTION: 0
COUGH: 0
DIZZINESS: 0
ORTHOPNEA: 0
SORE THROAT: 0
RESPIRATORY NEGATIVE: 1
NEUROLOGICAL NEGATIVE: 1
PND: 0
CARDIOVASCULAR NEGATIVE: 1
FEVER: 0
CHILLS: 0
CLAUDICATION: 0
WHEEZING: 0
STRIDOR: 0
WEAKNESS: 0
LOSS OF CONSCIOUSNESS: 0
PALPITATIONS: 0
BRUISES/BLEEDS EASILY: 0
SHORTNESS OF BREATH: 0
CONSTITUTIONAL NEGATIVE: 1
GASTROINTESTINAL NEGATIVE: 1
HEMOPTYSIS: 0
EYES NEGATIVE: 1

## 2019-05-01 NOTE — LETTER
"     [facility]   [address], [address]  [place] [address]  Phone: [phone]  Fax: [phone]              Roxana Cuba  1960    Encounter Date: 5/1/2019    Maxwell Phan M.D.          PROGRESS NOTE:  Chief Complaint   Patient presents with   • Pulmonary Hypertension       Subjective:   Roxana Cuba is a 59 y.o. female who presents today as a follow-up for her pulmonary hypertension chronic kidney disease cirrhosis status post liver transplant with pulmonary hypertension.  Since she was last seen she continues to do well.  She is had some changes in her steroids because of steroid dependence.  She is gotten off of opioids.  She is going on a trip.  She is having trouble sleeping.  She wants 90-day refills for her trip to [facility].    Past Medical History:   Diagnosis Date   • Anemia    • Anesthesia     \"takes more to get me under, would rather be intubated\"    • Breath shortness     cont oxygen 5L (Preffered)   • Bronchitis      2016   • Cardiomegaly    • Chronic fatigue and malaise    • Cirrhosis (HCC) December 2011    [place] [facility] [place]   • CKD (chronic kidney disease) stage 3, GFR 30-59 ml/min ([facility])    • Diabetes ([facility])     reports hx of, resolved w/weight loss. Reports still checking bloodsugars twice daily and using insulin PRN   • Fracture of left foot    • GERD (gastroesophageal reflux disease)         • H/O Clostridium difficile infection 02-17-17    reports \"16 months ago\". Current stool sample 01-26-17 neg   • Hemorrhagic disorder (HCC)     \"low platelets\" bruises easily    • Hiatus hernia syndrome    • High cholesterol    • Hypothyroid    • Jaundice 2009   • Liver transplanted (HCC)    • Low back pain 02-17-17    and left foot, 7/10   • Mild aortic regurgitation and aortic valve sclerosis     • On home oxygen therapy     5 liters, Dr. Gaming   • Platelet disorder (HCC)     low platelets   • Pneumonia     aspiration,  "   • Psychiatric disorder     Mood disorder   • Pulmonary hypertension (HCC)        • S/P cholecystectomy    • Small bowel obstruction (HCC)     01-   • Splenomegaly    • VRE (vancomycin-resistant Enterococci)     02-17-17, pt declines knowledge of this     Past Surgical History:   Procedure Laterality Date   • VENTRAL HERNIA REPAIR ROBOTIC XI N/A 11/12/2018    Procedure: VENTRAL HERNIA REPAIR ROBOTIC XI- FOR EPIGASTRIC INCISIONAL;  Surgeon: John H Ganser, M.D.;  Location: SURGERY [facility];  Service: General   • TURBINATE REDUCTION Bilateral 10/4/2018    Procedure: TURBINATE REDUCTION;  Surgeon: Silviano Erazo M.D.;  Location: SURGERY SAME DAY [facility];  Service: Ent   • NASAL RECONSTRUCTION Bilateral 10/4/2018    Procedure: NASAL RECONSTRUCTION- LATERA IMPLANTS;  Surgeon: Silviano Erazo M.D.;  Location: SURGERY SAME DAY [facility];  Service: Ent   • OTHER  12/11/2017    paniculectomy   • COLONOSCOPY N/A 3/27/2017    Procedure: COLONOSCOPY;  Surgeon: Osman Matt M.D.;  Location: SURGERY SAME DAY [facility];  Service:    • GASTROSCOPY N/A 3/27/2017    Procedure: GASTROSCOPY;  Surgeon: Osman Matt M.D.;  Location: SURGERY SAME DAY [facility];  Service:    • BREAST BIOPSY Left 2/21/2017    Procedure: BREAST BIOPSY - WIRE LOCALIZED EXCISONAL ;  Surgeon: Jane Zhao M.D.;  Location: SURGERY SAME DAY [facility];  Service:    • LUNG BIOPSY OPEN  11/2016   • OTHER ABDOMINAL SURGERY      Gasric Sleeve   • BONE MARROW ASPIRATION  3/16/2015    Performed by Freddie Hi M.D. at ENDOSCOPY [facility]   • BONE MARROW BIOPSY, NDL/TROCAR  3/16/2015    Performed by Freddie Hi M.D. at ENDOSCOPY [facility]   • RECOVERY  3/31/2014    Performed by Ir-Recovery Surgery at SURGERY [facility]   • RECOVERY  3/24/2014    Performed by Cath-Recovery Surgery at SURGERY SAME DAY [facility]   • RECOVERY  1/21/2014    Performed by Ir-Recovery Surgery at SURGERY SAME DAY  [facility]   • BRONCHOSCOPY-ENDO  11/15/2013    Performed by Ha Perez M.D. at ENDOSCOPY [facility]   • RECOVERY  2/27/2013    Performed by -Recovery Surgery at SURGERY SAME DAY [facility]   • BONE MARROW ASPIRATION  12/31/2012    Performed by Josemanuel Real M.D. at ENDOSCOPY [facility]   • BONE MARROW BIOPSY, NDL/TROCAR  12/31/2012    Performed by Josemanuel Real M.D. at ENDOSCOPY [facility]   • GASTROSCOPY  9/28/2012    Performed by Aravind Richards M.D. at SURGERY [facility]   • SIGMOIDOSCOPY FLEX  9/26/2012    Performed by Kristopher Cardoso M.D. at ENDOSCOPY [facility]   • BRONCHOSCOPY-ENDO  6/19/2012    Performed by MARLENA MURILLO at ENDOSCOPY [facility]   • BRONCHOSCOPY-ENDO  5/29/2012    Performed by SUYAPA CAMARENA at ENDOSCOPY [facility]   • OTHER ABDOMINAL SURGERY  December 2011    L[place] transplant at [facility] [place]. [place].   • GASTROSCOPY-ENDO  3/12/2010    Performed by CAMELIA SAMANO at ENDOSCOPY [facility]   • EXAM UNDER ANESTHESIA  11/11/2009    Performed by BIRD ABDI at SURGERY [facility]   • HEMORRHOIDECTOMY  11/11/2009    Performed by BIRD ABDI at SURGERY [facility]   • KELBY BY LAPAROSCOPY  9/19/2009    Performed by BIRD ABDI at SURGERY [facility]   • CARPAL TUNNEL RELEASE          • KELBY BY LAPAROSCOPY     • GASTRIC BYPASS LAPAROSCOPIC     • HERNIA REPAIR      x3   • HYSTERECTOMY, TOTAL ABDOMINAL          • OTHER      Breast reduction   • OTHER      liver transplant   • PB ANESTH,NOSE,SINUS SURGERY      x4   • TONSILLECTOMY       Family History   Problem Relation Age of Onset   • Other Father         Unknown (dead 10 years)   • Diabetes Father    • Heart Disease Father    • Hypertension Father    • Hyperlipidemia Father    • Stroke Father    • Non-contributory Mother    • Hyperlipidemia Mother    • Alcohol/Drug Mother    • Cancer Paternal Aunt    • Alcohol/Drug Maternal Grandmother    • Alcohol/Drug  Maternal Grandfather      Social History     Social History   • Marital status:      Spouse name: N/A   • Number of children: N/A   • Years of education: N/A     Occupational History   • Not on file.     Social History Main Topics   • Smoking status: Never Smoker   • Smokeless tobacco: Never Used      Comment: avoid all tobacco products   • Alcohol use No      Comment: 05/2009 quit drinking   • Drug use: No   • Sexual activity: Not on file     Other Topics Concern   • Not on file     Social History Narrative   • No narrative on file     Allergies   Allergen Reactions   • Rhubarb Anaphylaxis   • Acetaminophen      Can not take, hx of liver transplant    • Nsaids      Can not take due to hx of liver transplant    • Organic Nitrates      Nitroglycerin products should be avoided with the use of PDE-5 inhibitors as the combination can result in severe hypotension.  RD clarified with pt: Nitrates in food are okay and pt does not want nitrates to be listed as food allergy. Pt only avoids medications with nitrates.    • Other Drug      Any medication that may interact with cyclosporine, tacrolimus, sirolimus, or prograf (due to hx of liver transplant)    • Vancomycin Hcl      Causes red man syndrome when infused to fast       Outpatient Encounter Prescriptions as of 5/1/2019   Medication Sig Dispense Refill   • furosemide (LASIX) 40 MG Tab Take 0.5 Tabs by mouth 1 time daily as needed. For edema or increased weight >2lbs 90 Tab 3   • pravastatin (PRAVACHOL) 20 MG Tab Take 1 Tab by mouth every day. 90 Tab 3   • midodrine (PROAMATINE) 10 MG tablet Take 1 Tab by mouth every bedtime. 90 Tab 3   • ondansetron (ZOFRAN ODT) 4 MG TABLET DISPERSIBLE Take 1 Tab by mouth every 8 hours as needed for Nausea. 60 Tab 2   • fludrocortisone (FLORINEF) 0.1 MG Tab Take 1 Tab by mouth every morning. 30 Tab 11   • GLUCAGON EMERGENCY 1 MG Kit      • gabapentin (NEURONTIN) 600 MG tablet TAKE 1 TABLET BY MOUTH THREE TIMES A DAY 90 Tab 1   •  ALPRAZolam (XANAX) 1 MG Tab Take 1 mg by mouth 3 times a day.     • aspirin 81 MG EC tablet TAKE 1 TAB BY MOUTH EVERY DAY. 90 Tab 4   • levothyroxine (SYNTHROID) 137 MCG Tab TAKE 1 TAB BY MOUTH EVERY MORNING ON AN EMPTY STOMACH. 90 Tab 1   • Linaclotide (LINZESS) 290 MCG Cap Take 1 Cap by mouth every day. 90 Cap 3   • omeprazole (PRILOSEC) 40 MG delayed-release capsule Take 1 Cap by mouth every day. 90 Cap 3   • traZODone (DESYREL) 50 MG Tab Take 2 Tabs by mouth every bedtime. 180 Tab 3   • lactulose 10 GM/15ML Solution TAKE 30ML BY MOUTH TWICE DAILY 1800 mL 3   • sertraline (ZOLOFT) 100 MG Tab Take 1 Tab by mouth every bedtime. 90 Tab 3   • calcium citrate 315 mg-vitamin D 200 mg (CITRACAL+D) 315-200 MG-UNIT per tablet Take 1 Tab by mouth every day. 90 Tab 3   • [employer] GENTLE LAXATIVE 10 MG Suppos INSERT 1 SUPPOSITORY IN RECTUM 1 TIME DAILY AS NEEDED (CONSTIPATION). 30 Suppository 3   • ambrisentan (LETAIRIS) 10 MG tablet Take 1 Tab by mouth every day. 90 Tab 3   • fluticasone (FLONASE) 50 MCG/ACT nasal spray SPRAY 1 SPRAY IN EACH NOSTRIL TWICE A DAY 48 g 3   • SPIRIVA HANDIHALER 18 MCG Cap INHALE 1 CAPSULE VIA HANDIHALER ONCE DAILY AT THE SAME TIME EVERY DAY 30 Cap 6   • Tadalafil, PAH, (ADCIRCA) 20 MG Tab Take 20 mg by mouth every bedtime. 90 Tab 3   • sennosides (SENOKOT) 8.6 MG Tab Take 17.2 mg by mouth every 12 hours.     • calcium polycarbophil (FIBERCON) 625 MG Tab Take 625 mg by mouth every morning.     • tacrolimus (PROGRAF) 1 MG Cap Take 1 mg by mouth every 12 hours. Pt also has an RX for 0.5MG, pt takes total of 1.5MG BID      • Probiotic Product (PROBIOTIC DAILY PO) Take 1 Cap by mouth every evening.     • docusate sodium (COLACE) 100 MG Cap Take 100 mg by mouth See Admin Instructions. Three times a day at 0700, 1200, and 1800     • predniSONE (DELTASONE) 1 MG Tab Take 2 mg by mouth every morning. Pt also has an RX for 5MG, pt takes total of 7MG QDAY     • NON SPECIFIED Take 1 Cap by mouth every bedtime.  "Bariatric Dietary Supplment      • tacrolimus (PROGRAF) 0.5 MG Cap Take 0.5 mg by mouth every 12 hours. Pt also has an RX for 1MG, pt takes total of 1.5MG BID     • ascorbic acid (ASCORBIC ACID) 500 MG Tab Take 500 mg by mouth every morning.     • mycophenolate (CELLCEPT) 250 MG Cap Take 500 mg by mouth every 12 hours.     • [DISCONTINUED] pravastatin (PRAVACHOL) 20 MG Tab Take 1 Tab by mouth every day. 100 Tab 2   • tacrolimus (PROGRAF) 1 MG Cap Take 1 mg by mouth.     • [DISCONTINUED] furosemide (LASIX) 40 MG Tab Take 0.5 Tabs by mouth 1 time daily as needed. For edema or increased weight >2lbs     • [DISCONTINUED] midodrine (PROAMATINE) 5 MG Tab Take 5-15 mg by mouth 1 time daily as needed.     • predniSONE (DELTASONE) 5 MG Tab Take 5 mg by mouth every morning. Pt also has an RX for 1 MG  pt takes total of 7MG QDAY       No facility-administered encounter medications on file as of 5/1/2019.      Review of Systems   Constitutional: Negative.  Negative for chills, fever and malaise/fatigue.   HENT: Negative.  Negative for sore throat.    Eyes: Negative.    Respiratory: Negative.  Negative for cough, hemoptysis, sputum production, shortness of breath, wheezing and stridor.    Cardiovascular: Negative.  Negative for chest pain, palpitations, orthopnea, claudication, leg swelling and PND.   Gastrointestinal: Negative.    Genitourinary: Negative.    Musculoskeletal: Negative.    Skin: Negative.    Neurological: Negative.  Negative for dizziness, loss of consciousness and weakness.   Endo/Heme/Allergies: Negative.  Does not bruise/bleed easily.   All other systems reviewed and are negative.       Objective:   /70 (BP Location: Left arm, Patient Position: Sitting, BP Cuff Size: Adult)   Pulse 70   Ht 1.727 m (5' 8\")   Wt 64.4 kg (142 lb)   LMP 01/03/2000   SpO2 97%   BMI 21.59 kg/m²      Physical Exam   Constitutional: She appears well-developed and well-nourished. No distress.   HENT:   Head: Normocephalic " and atraumatic.   Right Ear: External ear normal.   Left Ear: External ear normal.   Nose: Nose normal.   Mouth/Throat: No oropharyngeal exudate.   Eyes: Pupils are equal, round, and reactive to light. Conjunctivae and EOM are normal. Right eye exhibits no discharge. Left eye exhibits no discharge. No scleral icterus.   Neck: Neck supple. No JVD present.   Cardiovascular: Normal rate, regular rhythm and intact distal pulses.  Exam reveals no gallop and no friction rub.    No murmur heard.  Pulmonary/Chest: Effort normal. No stridor. No respiratory distress. She has no wheezes. She has no rales. She exhibits no tenderness.   Abdominal: Soft. She exhibits no distension. There is no guarding.   Musculoskeletal: Normal range of motion. She exhibits no edema, tenderness or deformity.   Neurological: She is alert. She has normal reflexes. She displays normal reflexes. No cranial nerve deficit. She exhibits normal muscle tone. Coordination normal.   Skin: Skin is warm and dry. No rash noted. She is not diaphoretic. No erythema. No pallor.   Psychiatric: She has a normal mood and affect. Her behavior is normal. Judgment and thought content normal.   Nursing note and vitals reviewed.      Assessment:     1. Tippah's disease (HCC)     2. Adrenal insufficiency (HCC)     3. Chronic pain syndrome     4. CKD (chronic kidney disease) stage 3, GFR 30-59 ml/min- unclear cause, probably multifactorial  furosemide (LASIX) 40 MG Tab   5. High risk medication use  midodrine (PROAMATINE) 10 MG tablet   6. Hepatopulmonary syndrome (HCC)  midodrine (PROAMATINE) 10 MG tablet   7. H/O non-ST elevation myocardial infarction (NSTEMI)     8. Interstitial lung disease (HCC)  midodrine (PROAMATINE) 10 MG tablet   9. MGUS (monoclonal gammopathy of unknown significance)     10. Mild aortic regurgitation and aortic valve sclerosis     11. Mild mitral regurgitation  midodrine (PROAMATINE) 10 MG tablet   12. Ostium secundum type atrial septal defect,  S/P percutaneous closure     13. Pancytopenia (HCC)     14. Primary pulmonary hypertension (HCC)     15. Pure hypercholesterolemia     16. Pulmonary hypertension (HCC)     17. Sarcoidosis (HCC)     18. Splenomegaly  furosemide (LASIX) 40 MG Tab   19. Status post liver transplant Dr. Canada ([facility])  furosemide (LASIX) 40 MG Tab   20. Thrombocytopenia (HCC)  furosemide (LASIX) 40 MG Tab   21. Familial hypercholesterolemia  pravastatin (PRAVACHOL) 20 MG Tab       Medical Decision Making:  Today's Assessment / Status / Plan:     59-year-old female with hepatopulmonary simple high risk medication use interstitial lung disease AR hypercholesterolemia chronic disease and liver transplant after pulmonary retention.  At this point her risk factors are well-managed.  I refilled her medications for 90 days.  We will see her back in 6 months.    Thank for you allowing me to take part in your patient's care, please call should you have any questions or would like to discuss this patient.      Elisa Phillip M.D.  [id number] [address]  VIA In Basket

## 2019-05-01 NOTE — PROGRESS NOTES
"Chief Complaint   Patient presents with   • Pulmonary Hypertension       Subjective:   Roxana Cuba is a 59 y.o. female who presents today as a follow-up for her pulmonary hypertension chronic kidney disease cirrhosis status post liver transplant with pulmonary hypertension.  Since she was last seen she continues to do well.  She is had some changes in her steroids because of steroid dependence.  She is gotten off of opioids.  She is going on a trip.  She is having trouble sleeping.  She wants 90-day refills for her trip to [facility].    Past Medical History:   Diagnosis Date   • Anemia    • Anesthesia     \"takes more to get me under, would rather be intubated\"    • Breath shortness     cont oxygen 5L (Preffered)   • Bronchitis      2016   • Cardiomegaly    • Chronic fatigue and malaise    • Cirrhosis (HCC) December 2011    Status post liver transplant at [facility]   • CKD (chronic kidney disease) stage 3, GFR 30-59 ml/min (Formerly [facility])    • Diabetes (Formerly [facility])     reports hx of, resolved w/weight loss. Reports still checking bloodsugars twice daily and using insulin PRN   • Fracture of left foot    • GERD (gastroesophageal reflux disease)         • H/O Clostridium difficile infection 02-17-17    reports \"16 months ago\". Current stool sample 01-26-17 neg   • Hemorrhagic disorder (Formerly [facility])     \"low platelets\" bruises easily    • Hiatus hernia syndrome    • High cholesterol    • Hypothyroid    • Jaundice 2009   • Liver transplanted (Formerly [facility])    • Low back pain 02-17-17    and left foot, 7/10   • Mild aortic regurgitation and aortic valve sclerosis     • On home oxygen therapy     5 liters, Dr. Gaming   • Platelet disorder (Formerly [facility])     low platelets   • Pneumonia     aspiration,    • Psychiatric disorder     Mood disorder   • Pulmonary hypertension (Formerly [facility])        • S/P cholecystectomy    • Small bowel obstruction (Formerly [facility])     01-   • Splenomegaly    • VRE (vancomycin-resistant Enterococci)     02-17-17, pt declines knowledge of this "     Past Surgical History:   Procedure Laterality Date   • VENTRAL HERNIA REPAIR ROBOTIC XI N/A 11/12/2018    Procedure: VENTRAL HERNIA REPAIR ROBOTIC XI- FOR EPIGASTRIC INCISIONAL;  Surgeon: John H Ganser, M.D.;  Location: SURGERY [facility];  Service: General   • TURBINATE REDUCTION Bilateral 10/4/2018    Procedure: TURBINATE REDUCTION;  Surgeon: Silviano Erazo M.D.;  Location: SURGERY SAME DAY [facility];  Service: Ent   • NASAL RECONSTRUCTION Bilateral 10/4/2018    Procedure: NASAL RECONSTRUCTION- LATERA IMPLANTS;  Surgeon: Silviano Erazo M.D.;  Location: SURGERY SAME DAY [facility];  Service: Ent   • OTHER  12/11/2017    paniculectomy   • COLONOSCOPY N/A 3/27/2017    Procedure: COLONOSCOPY;  Surgeon: Osman Matt M.D.;  Location: SURGERY SAME DAY [facility];  Service:    • GASTROSCOPY N/A 3/27/2017    Procedure: GASTROSCOPY;  Surgeon: Osman Matt M.D.;  Location: SURGERY SAME DAY [facility];  Service:    • BREAST BIOPSY Left 2/21/2017    Procedure: BREAST BIOPSY - WIRE LOCALIZED EXCISONAL ;  Surgeon: Jane Zhao M.D.;  Location: SURGERY SAME DAY [facility];  Service:    • LUNG BIOPSY OPEN  11/2016   • OTHER ABDOMINAL SURGERY      Gasric Sleeve   • BONE MARROW ASPIRATION  3/16/2015    Performed by Freddie Hi M.D. at ENDOSCOPY [facility]   • BONE MARROW BIOPSY, NDL/TROCAR  3/16/2015    Performed by Freddie Hi M.D. at ENDOSCOPY [facility]   • RECOVERY  3/31/2014    Performed by [facility] at SURGERY [facility]   • RECOVERY  3/24/2014    Performed by [facility]Recovery Surgery at SURGERY SAME DAY [facility]   • RECOVERY  1/21/2014    Performed by [facility] at SURGERY SAME DAY [facility]   • BRONCHOSCOPY-ENDO  11/15/2013    Performed by Ha Perez M.D. at ENDOSCOPY [facility]   • RECOVERY  2/27/2013    Performed by [facility] at SURGERY SAME DAY [facility]   • BONE MARROW ASPIRATION  12/31/2012    Performed by Josemanuel ANGELO  CLINT Real at ENDOSCOPY [facility]   • BONE MARROW BIOPSY, NDL/TROCAR  12/31/2012    Performed by Josemanuel Real M.D. at ENDOSCOPY [facility]   • GASTROSCOPY  9/28/2012    Performed by Aravind Richards M.D. at SURGERY [facility]   • SIGMOIDOSCOPY FLEX  9/26/2012    Performed by Kristopher Cardoso M.D. at ENDOSCOPY [facility]   • BRONCHOSCOPY-ENDO  6/19/2012    Performed by MARLENA MURILLO at ENDOSCOPY [facility]   • BRONCHOSCOPY-ENDO  5/29/2012    Performed by SUYAPA CAMARENA at ENDOSCOPY [facility]   • OTHER ABDOMINAL SURGERY  December 2011    [place] [facility] [place]   • GASTROSCOPY-ENDO  3/12/2010    Performed by CAMELIA SAMANO at ENDOSCOPY [facility]   • EXAM UNDER ANESTHESIA  11/11/2009    Performed by BIRD ABDI at SURGERY [facility]   • HEMORRHOIDECTOMY  11/11/2009    Performed by BIRD ABDI at SURGERY [facility]   • KELBY BY LAPAROSCOPY  9/19/2009    Performed by BIRD ABDI at SURGERY [facility]   • CARPAL TUNNEL RELEASE          • KELBY BY LAPAROSCOPY     • GASTRIC BYPASS LAPAROSCOPIC     • HERNIA REPAIR      x3   • HYSTERECTOMY, TOTAL ABDOMINAL          • OTHER      Breast reduction   • OTHER      liver transplant   • PB ANESTH,NOSE,SINUS SURGERY      x4   • TONSILLECTOMY       Family History   Problem Relation Age of Onset   • Other Father         Unknown (dead 10 years)   • Diabetes Father    • Heart Disease Father    • Hypertension Father    • Hyperlipidemia Father    • Stroke Father    • Non-contributory Mother    • Hyperlipidemia Mother    • Alcohol/Drug Mother    • Cancer Paternal Aunt    • Alcohol/Drug Maternal Grandmother    • Alcohol/Drug Maternal Grandfather      Social History     Social History   • Marital status:      Spouse name: N/A   • Number of children: N/A   • Years of education: N/A     Occupational History   • Not on file.     Social History Main Topics   • Smoking status: Never Smoker    • Smokeless tobacco: Never Used      Comment: avoid all tobacco products   • Alcohol use No      Comment: 05/2009 quit drinking   • Drug use: No   • Sexual activity: Not on file     Other Topics Concern   • Not on file     Social History Narrative   • No narrative on file     Allergies   Allergen Reactions   • Rhubarb Anaphylaxis   • Acetaminophen      Can not take, hx of liver transplant    • Nsaids      Can not take due to hx of liver transplant    • Organic Nitrates      Nitroglycerin products should be avoided with the use of PDE-5 inhibitors as the combination can result in severe hypotension.  RD clarified with pt: Nitrates in food are okay and pt does not want nitrates to be listed as food allergy. Pt only avoids medications with nitrates.    • Other Drug      Any medication that may interact with cyclosporine, tacrolimus, sirolimus, or prograf (due to hx of liver transplant)    • Vancomycin Hcl      Causes red man syndrome when infused to fast       Outpatient Encounter Prescriptions as of 5/1/2019   Medication Sig Dispense Refill   • furosemide (LASIX) 40 MG Tab Take 0.5 Tabs by mouth 1 time daily as needed. For edema or increased weight >2lbs 90 Tab 3   • pravastatin (PRAVACHOL) 20 MG Tab Take 1 Tab by mouth every day. 90 Tab 3   • midodrine (PROAMATINE) 10 MG tablet Take 1 Tab by mouth every bedtime. 90 Tab 3   • ondansetron (ZOFRAN ODT) 4 MG TABLET DISPERSIBLE Take 1 Tab by mouth every 8 hours as needed for Nausea. 60 Tab 2   • fludrocortisone (FLORINEF) 0.1 MG Tab Take 1 Tab by mouth every morning. 30 Tab 11   • GLUCAGON EMERGENCY 1 MG Kit      • gabapentin (NEURONTIN) 600 MG tablet TAKE 1 TABLET BY MOUTH THREE TIMES A DAY 90 Tab 1   • ALPRAZolam (XANAX) 1 MG Tab Take 1 mg by mouth 3 times a day.     • aspirin 81 MG EC tablet TAKE 1 TAB BY MOUTH EVERY DAY. 90 Tab 4   • levothyroxine (SYNTHROID) 137 MCG Tab TAKE 1 TAB BY MOUTH EVERY MORNING ON AN EMPTY STOMACH. 90 Tab 1   • Linaclotide (LINZESS) 290  MCG Cap Take 1 Cap by mouth every day. 90 Cap 3   • omeprazole (PRILOSEC) 40 MG delayed-release capsule Take 1 Cap by mouth every day. 90 Cap 3   • traZODone (DESYREL) 50 MG Tab Take 2 Tabs by mouth every bedtime. 180 Tab 3   • lactulose 10 GM/15ML Solution TAKE 30ML BY MOUTH TWICE DAILY 1800 mL 3   • sertraline (ZOLOFT) 100 MG Tab Take 1 Tab by mouth every bedtime. 90 Tab 3   • calcium citrate 315 mg-vitamin D 200 mg (CITRACAL+D) 315-200 MG-UNIT per tablet Take 1 Tab by mouth every day. 90 Tab 3   • [employer] GENTLE LAXATIVE 10 MG Suppos INSERT 1 SUPPOSITORY IN RECTUM 1 TIME DAILY AS NEEDED (CONSTIPATION). 30 Suppository 3   • ambrisentan (LETAIRIS) 10 MG tablet Take 1 Tab by mouth every day. 90 Tab 3   • fluticasone (FLONASE) 50 MCG/ACT nasal spray SPRAY 1 SPRAY IN EACH NOSTRIL TWICE A DAY 48 g 3   • SPIRIVA HANDIHALER 18 MCG Cap INHALE 1 CAPSULE VIA HANDIHALER ONCE DAILY AT THE SAME TIME EVERY DAY 30 Cap 6   • Tadalafil, PAH, (ADCIRCA) 20 MG Tab Take 20 mg by mouth every bedtime. 90 Tab 3   • sennosides (SENOKOT) 8.6 MG Tab Take 17.2 mg by mouth every 12 hours.     • calcium polycarbophil (FIBERCON) 625 MG Tab Take 625 mg by mouth every morning.     • tacrolimus (PROGRAF) 1 MG Cap Take 1 mg by mouth every 12 hours. Pt also has an RX for 0.5MG, pt takes total of 1.5MG BID      • Probiotic Product (PROBIOTIC DAILY PO) Take 1 Cap by mouth every evening.     • docusate sodium (COLACE) 100 MG Cap Take 100 mg by mouth See Admin Instructions. Three times a day at 0700, 1200, and 1800     • predniSONE (DELTASONE) 1 MG Tab Take 2 mg by mouth every morning. Pt also has an RX for 5MG, pt takes total of 7MG QDAY     • NON SPECIFIED Take 1 Cap by mouth every bedtime. Bariatric Dietary Supplment      • tacrolimus (PROGRAF) 0.5 MG Cap Take 0.5 mg by mouth every 12 hours. Pt also has an RX for 1MG, pt takes total of 1.5MG BID     • ascorbic acid (ASCORBIC ACID) 500 MG Tab Take 500 mg by mouth every morning.     • mycophenolate  "(CELLCEPT) 250 MG Cap Take 500 mg by mouth every 12 hours.     • [DISCONTINUED] pravastatin (PRAVACHOL) 20 MG Tab Take 1 Tab by mouth every day. 100 Tab 2   • tacrolimus (PROGRAF) 1 MG Cap Take 1 mg by mouth.     • [DISCONTINUED] furosemide (LASIX) 40 MG Tab Take 0.5 Tabs by mouth 1 time daily as needed. For edema or increased weight >2lbs     • [DISCONTINUED] midodrine (PROAMATINE) 5 MG Tab Take 5-15 mg by mouth 1 time daily as needed.     • predniSONE (DELTASONE) 5 MG Tab Take 5 mg by mouth every morning. Pt also has an RX for 1 MG  pt takes total of 7MG QDAY       No facility-administered encounter medications on file as of 5/1/2019.      Review of Systems   Constitutional: Negative.  Negative for chills, fever and malaise/fatigue.   HENT: Negative.  Negative for sore throat.    Eyes: Negative.    Respiratory: Negative.  Negative for cough, hemoptysis, sputum production, shortness of breath, wheezing and stridor.    Cardiovascular: Negative.  Negative for chest pain, palpitations, orthopnea, claudication, leg swelling and PND.   Gastrointestinal: Negative.    Genitourinary: Negative.    Musculoskeletal: Negative.    Skin: Negative.    Neurological: Negative.  Negative for dizziness, loss of consciousness and weakness.   Endo/Heme/Allergies: Negative.  Does not bruise/bleed easily.   All other systems reviewed and are negative.       Objective:   /70 (BP Location: Left arm, Patient Position: Sitting, BP Cuff Size: Adult)   Pulse 70   Ht 1.727 m (5' 8\")   Wt 64.4 kg (142 lb)   LMP 01/03/2000   SpO2 97%   BMI 21.59 kg/m²     Physical Exam   Constitutional: She appears well-developed and well-nourished. No distress.   HENT:   Head: Normocephalic and atraumatic.   Right Ear: External ear normal.   Left Ear: External ear normal.   Nose: Nose normal.   Mouth/Throat: No oropharyngeal exudate.   Eyes: Pupils are equal, round, and reactive to light. Conjunctivae and EOM are normal. Right eye exhibits no " discharge. Left eye exhibits no discharge. No scleral icterus.   Neck: Neck supple. No JVD present.   Cardiovascular: Normal rate, regular rhythm and intact distal pulses.  Exam reveals no gallop and no friction rub.    No murmur heard.  Pulmonary/Chest: Effort normal. No stridor. No respiratory distress. She has no wheezes. She has no rales. She exhibits no tenderness.   Abdominal: Soft. She exhibits no distension. There is no guarding.   Musculoskeletal: Normal range of motion. She exhibits no edema, tenderness or deformity.   Neurological: She is alert. She has normal reflexes. She displays normal reflexes. No cranial nerve deficit. She exhibits normal muscle tone. Coordination normal.   Skin: Skin is warm and dry. No rash noted. She is not diaphoretic. No erythema. No pallor.   Psychiatric: She has a normal mood and affect. Her behavior is normal. Judgment and thought content normal.   Nursing note and vitals reviewed.      Assessment:     1. Clermont's disease (HCC)     2. Adrenal insufficiency (HCC)     3. Chronic pain syndrome     4. CKD (chronic kidney disease) stage 3, GFR 30-59 ml/min- unclear cause, probably multifactorial  furosemide (LASIX) 40 MG Tab   5. High risk medication use  midodrine (PROAMATINE) 10 MG tablet   6. Hepatopulmonary syndrome (HCC)  midodrine (PROAMATINE) 10 MG tablet   7. H/O non-ST elevation myocardial infarction (NSTEMI)     8. Interstitial lung disease (HCC)  midodrine (PROAMATINE) 10 MG tablet   9. MGUS (monoclonal gammopathy of unknown significance)     10. Mild aortic regurgitation and aortic valve sclerosis     11. Mild mitral regurgitation  midodrine (PROAMATINE) 10 MG tablet   12. Ostium secundum type atrial septal defect, S/P percutaneous closure     13. Pancytopenia (HCC)     14. Primary pulmonary hypertension (HCC)     15. Pure hypercholesterolemia     16. Pulmonary hypertension (HCC)     17. Sarcoidosis (HCC)     18. Splenomegaly  furosemide (LASIX) 40 MG Tab   19.  Status post liver transplant Dr. Canada ([facility])  furosemide (LASIX) 40 MG Tab   20. Thrombocytopenia (HCC)  furosemide (LASIX) 40 MG Tab   21. Familial hypercholesterolemia  pravastatin (PRAVACHOL) 20 MG Tab       Medical Decision Making:  Today's Assessment / Status / Plan:     59-year-old female with hepatopulmonary simple high risk medication use interstitial lung disease AR hypercholesterolemia chronic disease and liver transplant after pulmonary retention.  At this point her risk factors are well-managed.  I refilled her medications for 90 days.  We will see her back in 6 months.    Thank for you allowing me to take part in your patient's care, please call should you have any questions or would like to discuss this patient.

## 2019-05-01 NOTE — TELEPHONE ENCOUNTER
ESTABLISHED PATIENT PRE-VISIT PLANNING     Patient was NOT contacted to complete PVP.     Note: Patient will not be contacted if there is no indication to call.     1.  Reviewed notes from the last few office visits within the medical group: Yes    2.  If any orders were placed at last visit or intended to be done for this visit (i.e. 6 mos follow-up), do we have Results/Consult Notes?        •  Labs - Labs were not ordered at last office visit.       •  Imaging - Imaging was not ordered at last office visit.       •  Referrals - No referrals were ordered at last office visit.    3. Is this appointment scheduled as a Hospital Follow-Up? No    4.  Immunizations were updated in Epic using WebIZ?: Epic matches WebIZ       •  Web Iz Recommendations: SHINGRIX (Shingles)    5.  Patient is due for the following Health Maintenance Topics:   Health Maintenance Due   Topic Date Due   • Annual Wellness Visit  1960   • IMM ZOSTER VACCINES (2 of 2) 01/29/2019       - Patient has completed FLU, PNEUMOVAX (PPSV23), PREVNAR (PCV13)  and TDAP Immunization(s) per WebIZ. Chart has been updated.    6. Orders for overdue Health Maintenance topics pended in Pre-Charting? N\A    7.  AHA (MDX) form printed for Provider? No, already completed    8.  Patient was NOT informed to arrive 15 min prior to their scheduled appointment and bring in their medication bottles.

## 2019-05-02 ENCOUNTER — OFFICE VISIT (OUTPATIENT)
Dept: MEDICAL GROUP | Facility: LAB | Age: 59
End: 2019-05-02
Payer: MEDICARE

## 2019-05-02 VITALS
DIASTOLIC BLOOD PRESSURE: 62 MMHG | OXYGEN SATURATION: 93 % | SYSTOLIC BLOOD PRESSURE: 112 MMHG | HEIGHT: 68 IN | HEART RATE: 78 BPM | TEMPERATURE: 99.5 F | BODY MASS INDEX: 20.92 KG/M2 | WEIGHT: 138 LBS | RESPIRATION RATE: 20 BRPM

## 2019-05-02 DIAGNOSIS — E03.9 ACQUIRED HYPOTHYROIDISM: ICD-10-CM

## 2019-05-02 DIAGNOSIS — F41.1 GAD (GENERALIZED ANXIETY DISORDER): ICD-10-CM

## 2019-05-02 DIAGNOSIS — K59.03 DRUG-INDUCED CONSTIPATION: ICD-10-CM

## 2019-05-02 DIAGNOSIS — D84.9 IMMUNOCOMPROMISED STATE (HCC): ICD-10-CM

## 2019-05-02 DIAGNOSIS — D61.818 PANCYTOPENIA (HCC): ICD-10-CM

## 2019-05-02 DIAGNOSIS — F42.2 MIXED OBSESSIONAL THOUGHTS AND ACTS: ICD-10-CM

## 2019-05-02 DIAGNOSIS — Z79.899 CHRONIC PRESCRIPTION BENZODIAZEPINE USE: ICD-10-CM

## 2019-05-02 DIAGNOSIS — F33.41 RECURRENT MAJOR DEPRESSIVE DISORDER, IN PARTIAL REMISSION (HCC): ICD-10-CM

## 2019-05-02 PROCEDURE — 99214 OFFICE O/P EST MOD 30 MIN: CPT | Performed by: FAMILY MEDICINE

## 2019-05-02 RX ORDER — CYCLOSPORINE 0.5 MG/ML
1 EMULSION OPHTHALMIC 2 TIMES DAILY
Status: ON HOLD | COMMUNITY
End: 2021-08-03

## 2019-05-02 RX ORDER — LEVOTHYROXINE SODIUM 137 UG/1
137 TABLET ORAL
Qty: 90 TAB | Refills: 1 | Status: ON HOLD | OUTPATIENT
Start: 2019-05-02 | End: 2021-08-03

## 2019-05-02 RX ORDER — BISACODYL 10 MG
10 SUPPOSITORY, RECTAL RECTAL
Qty: 90 SUPPOSITORY | Refills: 3 | Status: ON HOLD | OUTPATIENT
Start: 2019-05-02 | End: 2021-08-03

## 2019-05-02 RX ORDER — SERTRALINE HYDROCHLORIDE 100 MG/1
100 TABLET, FILM COATED ORAL
Qty: 90 TAB | Refills: 3 | Status: ON HOLD | OUTPATIENT
Start: 2019-05-02 | End: 2021-08-03

## 2019-05-02 RX ORDER — ALPRAZOLAM 1 MG/1
1 TABLET ORAL 3 TIMES DAILY
Qty: 180 TAB | Refills: 0 | Status: SHIPPED | OUTPATIENT
Start: 2019-05-28 | End: 2019-07-27

## 2019-05-05 NOTE — TELEPHONE ENCOUNTER
Was the patient seen in the last year in this department? Yes lov 5/2/19    Does patient have an active prescription for medications requested? No     Received Request Via: Pharmacy

## 2019-05-06 RX ORDER — PREDNISONE 5 MG/1
5 TABLET ORAL DAILY
Qty: 30 TAB | Refills: 3 | Status: SHIPPED | OUTPATIENT
Start: 2019-05-06 | End: 2019-09-01 | Stop reason: SDUPTHER

## 2019-05-06 RX ORDER — PREDNISONE 10 MG/1
TABLET ORAL
Qty: 40 TAB | Refills: 0 | Status: ON HOLD | OUTPATIENT
Start: 2019-05-06 | End: 2021-08-03

## 2019-05-06 NOTE — TELEPHONE ENCOUNTER
Patient is requesting Prednisone of 5mg and 10 mg to have on hand for her trip to [facility]        Have we ever prescribed this med? Yes.  If yes, what date? 03/12/2018    Last OV: 03/14/2019- Dr. Gaming     Next OV: 08/23/2019- Dr. Gaming     DX:     Medications:   Requested Prescriptions     Pending Prescriptions Disp Refills   • predniSONE (DELTASONE) 5 MG Tab       Sig: Take 1 Tab by mouth every morning. Pt also has an RX for 1 MG  pt takes total of 7MG QDAY

## 2019-05-07 RX ORDER — GABAPENTIN 600 MG/1
TABLET ORAL
Qty: 90 TAB | Refills: 1 | Status: SHIPPED | OUTPATIENT
Start: 2019-05-07 | End: 2019-06-28 | Stop reason: SDUPTHER

## 2019-05-08 NOTE — PROGRESS NOTES
Subjective:     Chief Complaint   Patient presents with   • Medication Refill       Roxana Cuba is a 59 y.o. female here today for evaluation and management of:    Recurrent major depressive disorder, in partial remission (HCC)  Stable. Currently taking sertraline 100 mg  as prescribed.   Patient is going to [facility] for 2 months in early June and she is quite excited about this.  Denies side effects and is tolerating well.  Mood is improved with current medication and therapy.    Patient denies SI/HI.  Depression Screen (PHQ-2/PHQ-9) 11/23/2018 11/28/2018 3/5/2019   PHQ-2 Total Score 0 0 0   PHQ-2 Total Score - - -   PHQ-2 Total Score - - -   PHQ-9 Total Score - - -           Pancytopenia (HCC)  Patient is currently on immunosuppressive therapy post liver transplant.    DUC (generalized anxiety disorder)  Patient is currently stable on alprazolam three times daily.  This is managing her anxiety.    Acquired hypothyroidism  Stable. Currently taking levothyroxine 137 mcg daily as prescribed.  Denies palpitations, skin changes, temperature intolerance, or changes in bowel habits           Allergies   Allergen Reactions   • Rhubarb Anaphylaxis   • Acetaminophen      Can not take, hx of liver transplant    • Nsaids      Can not take due to hx of liver transplant    • Organic Nitrates      Nitroglycerin products should be avoided with the use of PDE-5 inhibitors as the combination can result in severe hypotension.  RD clarified with pt: Nitrates in food are okay and pt does not want nitrates to be listed as food allergy. Pt only avoids medications with nitrates.    • Other Drug      Any medication that may interact with cyclosporine, tacrolimus, sirolimus, or prograf (due to hx of liver transplant)    • Vancomycin Hcl      Causes red man syndrome when infused to fast         Current medicines (including changes today)  Current Outpatient Prescriptions   Medication Sig Dispense Refill   • cyclosporin (RESTASIS) 0.05 %  ophthalmic emulsion Place 1 Drop in both eyes 2 times a day.     • [START ON 5/28/2019] ALPRAZolam (XANAX) 1 MG Tab Take 1 Tab by mouth 3 times a day for 60 days. Vacation Override 180 Tab 0   • bisacodyl ([employer] GENTLE LAXATIVE) 10 MG Suppos Insert 1 Suppository in rectum 1 time daily as needed (constipation). 90 Suppository 3   • sertraline (ZOLOFT) 100 MG Tab Take 1 Tab by mouth every bedtime. 90 Tab 3   • levothyroxine (SYNTHROID) 137 MCG Tab Take 1 Tab by mouth Every morning on an empty stomach. 90 Tab 1   • calcium citrate 315 mg-vitamin D 200 mg (CITRACAL+D) 315-200 MG-UNIT per tablet Take 1 Tab by mouth every day. 90 Tab 3   • furosemide (LASIX) 40 MG Tab Take 0.5 Tabs by mouth 1 time daily as needed. For edema or increased weight >2lbs 90 Tab 3   • pravastatin (PRAVACHOL) 20 MG Tab Take 1 Tab by mouth every day. 90 Tab 3   • midodrine (PROAMATINE) 10 MG tablet Take 1 Tab by mouth every bedtime. 90 Tab 3   • ondansetron (ZOFRAN ODT) 4 MG TABLET DISPERSIBLE Take 1 Tab by mouth every 8 hours as needed for Nausea. 60 Tab 2   • fludrocortisone (FLORINEF) 0.1 MG Tab Take 1 Tab by mouth every morning. 30 Tab 11   • GLUCAGON EMERGENCY 1 MG Kit      • aspirin 81 MG EC tablet TAKE 1 TAB BY MOUTH EVERY DAY. 90 Tab 4   • Linaclotide (LINZESS) 290 MCG Cap Take 1 Cap by mouth every day. 90 Cap 3   • omeprazole (PRILOSEC) 40 MG delayed-release capsule Take 1 Cap by mouth every day. 90 Cap 3   • traZODone (DESYREL) 50 MG Tab Take 2 Tabs by mouth every bedtime. 180 Tab 3   • lactulose 10 GM/15ML Solution TAKE 30ML BY MOUTH TWICE DAILY 1800 mL 3   • ambrisentan (LETAIRIS) 10 MG tablet Take 1 Tab by mouth every day. 90 Tab 3   • fluticasone (FLONASE) 50 MCG/ACT nasal spray SPRAY 1 SPRAY IN EACH NOSTRIL TWICE A DAY 48 g 3   • SPIRIVA HANDIHALER 18 MCG Cap INHALE 1 CAPSULE VIA HANDIHALER ONCE DAILY AT THE SAME TIME EVERY DAY 30 Cap 6   • Tadalafil, PAH, (ADCIRCA) 20 MG Tab Take 20 mg by mouth every bedtime. 90 Tab 3   • sennosides  (SENOKOT) 8.6 MG Tab Take 17.2 mg by mouth every 12 hours.     • calcium polycarbophil (FIBERCON) 625 MG Tab Take 625 mg by mouth every morning.     • Probiotic Product (PROBIOTIC DAILY PO) Take 1 Cap by mouth every evening.     • docusate sodium (COLACE) 100 MG Cap Take 100 mg by mouth See Admin Instructions. Three times a day at 0700, 1200, and 1800     • predniSONE (DELTASONE) 1 MG Tab Take 2 mg by mouth every morning. Pt also has an RX for 5MG, pt takes total of 7MG QDAY     • NON SPECIFIED Take 1 Cap by mouth every bedtime. Bariatric Dietary Supplment      • tacrolimus (PROGRAF) 0.5 MG Cap Take 0.5 mg by mouth every 12 hours. Pt also has an RX for 1MG, pt takes total of 1.5MG BID     • ascorbic acid (ASCORBIC ACID) 500 MG Tab Take 500 mg by mouth every morning.     • mycophenolate (CELLCEPT) 250 MG Cap Take 500 mg by mouth every 12 hours.     • gabapentin (NEURONTIN) 600 MG tablet TAKE 1 TABLET BY MOUTH THREE TIMES A DAY 90 Tab 1   • predniSONE (DELTASONE) 5 MG Tab Take 1 Tab by mouth every day. Pt also has an RX for 1 MG  pt takes total of 7MG QDAY 30 Tab 3   • predniSONE (DELTASONE) 10 MG Tab Take 40mg x 4 days, then take 30mg x 4 days, then take 20mg x 4 days, then 10mg x 4 days with food, then discontinue. 40 Tab 0   • tacrolimus (PROGRAF) 1 MG Cap Take 1 mg by mouth.     • tacrolimus (PROGRAF) 1 MG Cap Take 1 mg by mouth every 12 hours. Pt also has an RX for 0.5MG, pt takes total of 1.5MG BID        No current facility-administered medications for this visit.        She  has a past medical history of Anemia; Anesthesia; Breath shortness; Bronchitis ( ); Cardiomegaly; Chronic fatigue and malaise; Cirrhosis (HCC) (December 2011); CKD (chronic kidney disease) stage 3, GFR 30-59 ml/min (HCC); Diabetes (HCC); Fracture of left foot; GERD (gastroesophageal reflux disease); H/O Clostridium difficile infection (02-17-17); Hemorrhagic disorder ([facility]); Hiatus hernia syndrome; High cholesterol; Hypothyroid; Jaundice  (2009); Liver transplanted (HCC); Low back pain (02-17-17); Mild aortic regurgitation and aortic valve sclerosis ( ); On home oxygen therapy; Platelet disorder (HCC); Pneumonia; Psychiatric disorder; Pulmonary hypertension (HCC); S/P cholecystectomy; Small bowel obstruction (HCC); Splenomegaly; and VRE (vancomycin-resistant Enterococci).    Patient Active Problem List    Diagnosis Date Noted   • Adrenal insufficiency (HCC) 03/05/2019     Priority: High   • Pure hypercholesterolemia 12/09/2014     Priority: High   • Mild aortic regurgitation and aortic valve sclerosis 03/17/2014     Priority: High   • Immunocompromised state (HCC) 11/14/2015     Priority: Medium   • Chronic respiratory failure with hypoxia (HCC) 11/13/2014     Priority: Medium   • Vitamin D deficiency 08/26/2014     Priority: Medium   • Ostium secundum type atrial septal defect, S/P percutaneous closure 03/17/2014     Priority: Medium   • Hepatopulmonary syndrome (HCC) 03/05/2014     Priority: Medium   • CKD (chronic kidney disease) stage 3, GFR 30-59 ml/min- unclear cause, probably multifactorial 02/25/2014     Priority: Medium   • Interstitial lung disease (HCC) 11/14/2013     Priority: Medium   • MGUS (monoclonal gammopathy of unknown significance) 11/14/2013     Priority: Medium   • History of pancreatitis 06/20/2012     Priority: Medium   • Pulmonary hypertension (HCC) 02/03/2015     Priority: Low   • Sarcoidosis (HCC) 11/14/2013     Priority: Low   • DUC (generalized anxiety disorder) 11/04/2013     Priority: Low   • Status post liver transplant Dr. Canada ([facility]) 03/13/2012     Priority: Low   • Acquired hypothyroidism 03/13/2012     Priority: Low   • Cold sore 04/04/2019   • AP (abdominal pain) 03/05/2019   • Pancytopenia (HCC) 03/05/2019   • Mixed obsessional thoughts and acts 01/30/2019   • Recurrent major depressive disorder, in partial remission ([facility]) 01/30/2019   • Incisional hernia, without obstruction or gangrene 01/30/2019   •  "History of infection with vancomycin resistant Enterococcus (VRE) 01/30/2019   • Numbness and tingling of left thumb 01/30/2019   • High risk medication use 08/28/2018   • History of aspiration pneumonia 02/06/2018   • Hiatal hernia 02/06/2018   • Chronic nausea 01/25/2018   • Thrombocytopenia (HCC) 01/21/2018   • Hessel's disease (HCC) 11/03/2017   • Slow transit constipation 11/03/2017   • S/P bariatric surgery 10/31/2017   • Steroid-induced hyperglycemia 10/03/2017   • History of small bowel obstruction 07/14/2017   • History of Clostridium difficile infection 07/14/2017   • Chronic prescription benzodiazepine use 04/12/2017   • GERD (gastroesophageal reflux disease) 01/04/2017   • Other allergic rhinitis 07/22/2016   • Chronic pain syndrome 06/15/2016   • Back pain 02/22/2016   • Splenic lesion 11/14/2015   • Mild mitral regurgitation 07/14/2015   • Splenomegaly 07/14/2015   • On prednisone therapy 07/06/2015   • H/O non-ST elevation myocardial infarction (NSTEMI) 05/16/2013   • Primary pulmonary hypertension (HCC) 01/13/2012       ROS   No fever or chills.  No nausea or vomiting.  No chest pain or palpitations.  No cough or SOB.  No pain with urination or hematuria.  No black or bloody stools.       Objective:     /62 (BP Location: Left arm, Patient Position: Sitting, BP Cuff Size: Adult)   Pulse 78   Temp 37.5 °C (99.5 °F) (Temporal)   Resp 20   Ht 1.727 m (5' 8\")   Wt 62.6 kg (138 lb)   SpO2 93%  Body mass index is 20.98 kg/m².   Physical Exam:  Well developed, well nourished.  Alert, oriented in no acute distress.  Eye contact is good, speech goal directed, affect calm  Eyes: conjunctiva non-injected, sclera non-icteric.  Neck Supple.  No adenopathy or masses in the neck or supraclavicular regions. No thyromegaly  Lungs: clear to auscultation bilaterally with good excursion. No wheezes or rhonchi  CV: regular rate and rhythm. No murmur         Assessment and Plan:   The following treatment plan " was discussed    1. DUC (generalized anxiety disorder)  Controlled substance agreement on chart.  [place]  reviewed.  Patient is due for her annual urine drug screen,  Refilled alprazolam for 3 months.  Again discussed the risk of long term benzodiazepine use.    - ALPRAZolam (XANAX) 1 MG Tab; Take 1 Tab by mouth 3 times a day for 60 days. Vacation Override  Dispense: 180 Tab; Refill: 0  - sertraline (ZOLOFT) 100 MG Tab; Take 1 Tab by mouth every bedtime.  Dispense: 90 Tab; Refill: 3  - PAIN MANAGEMENT SCRN, UR; Future    2. Drug-induced constipation  Continue current medications  - bisacodyl ([employer]S GENTLE LAXATIVE) 10 MG Suppos; Insert 1 Suppository in rectum 1 time daily as needed (constipation).  Dispense: 90 Suppository; Refill: 3    3. Mixed obsessional thoughts and acts  This is a chronic medical condition that is currently stable  Continue sertraline  - sertraline (ZOLOFT) 100 MG Tab; Take 1 Tab by mouth every bedtime.  Dispense: 90 Tab; Refill: 3    4. Recurrent major depressive disorder, in partial remission (HCC)  This is a chronic medical condition that is currently stable  Continue sertraline  - sertraline (ZOLOFT) 100 MG Tab; Take 1 Tab by mouth every bedtime.  Dispense: 90 Tab; Refill: 3    5. Acquired hypothyroidism  This is a chronic medical condition that is currently stable  Continue current levothyroxine dose  - levothyroxine (SYNTHROID) 137 MCG Tab; Take 1 Tab by mouth Every morning on an empty stomach.  Dispense: 90 Tab; Refill: 1    6. Pancytopenia (HCC)  This is a chronic medical condition that is currently stable  Secondary to immunosuppressives status post liver transport    7. Immunocompromised state (HCC)  Secondary to immunosuppressives status post liver transport    8. Chronic prescription benzodiazepine use  Controlled substance agreement on chart.  NV  reviewed.  Patient is due for her annual urine drug screen,  Refilled alprazolam for 3 months.  Again discussed the risk of long term  benzodiazepine use.    - PAIN MANAGEMENT SCRN, UR; Future    Any change or worsening of signs or symptoms, patient encouraged to follow-up or report to the emergency room for further evaluation. Patient understands and agrees.    Followup: Return in about 3 months (around 8/8/2019).

## 2019-05-09 ENCOUNTER — PATIENT MESSAGE (OUTPATIENT)
Dept: MEDICAL GROUP | Facility: LAB | Age: 59
End: 2019-05-09

## 2019-05-14 ENCOUNTER — TELEPHONE (OUTPATIENT)
Dept: CARDIOLOGY | Facility: MEDICAL CENTER | Age: 59
End: 2019-05-14

## 2019-05-14 NOTE — TELEPHONE ENCOUNTER
Roxana Phan M.D. 6 days ago         Dr. Phan,   [place] [facility] [place] he manages my post liver transplant follow ups. He expressed some concern to me regarding my heart murmur - and its involvement with Pulmonology hypertension. Your thoughts?   Thank you,   Roxana Cuba         ===========================   To Dr. Phan to advise

## 2019-05-16 ENCOUNTER — TELEPHONE (OUTPATIENT)
Dept: CARDIOLOGY | Facility: MEDICAL CENTER | Age: 59
End: 2019-05-16

## 2019-05-16 NOTE — TELEPHONE ENCOUNTER
PAR sent to plan:  Roxana Cuba (Hill: [id number]) - [phone]   Need help? Call us at [phone]     [facility]   [facility]   Next Steps   The plan will fax you a determination, typically within 1 to 5 business days.  How do I follow up?   DrugTadalafil (PAH) 20MG tablets   FormMedImpact Medicare Part D Coverage Determination [facility] Medicare Prior Authorization Form for Part D Coverage [phone]

## 2019-05-21 ENCOUNTER — HOSPITAL ENCOUNTER (OUTPATIENT)
Dept: LAB | Facility: MEDICAL CENTER | Age: 59
End: 2019-05-21
Attending: INTERNAL MEDICINE
Payer: MEDICARE

## 2019-05-21 LAB
ALBUMIN SERPL BCP-MCNC: 3.7 G/DL (ref 3.2–4.9)
ALBUMIN/GLOB SERPL: 1.9 G/DL
ALP SERPL-CCNC: 33 U/L (ref 30–99)
ALT SERPL-CCNC: 23 U/L (ref 2–50)
ANION GAP SERPL CALC-SCNC: 7 MMOL/L (ref 0–11.9)
AST SERPL-CCNC: 25 U/L (ref 12–45)
BASOPHILS # BLD AUTO: 0.5 % (ref 0–1.8)
BASOPHILS # BLD AUTO: 1 % (ref 0–1.8)
BASOPHILS # BLD: 0.01 K/UL (ref 0–0.12)
BASOPHILS # BLD: 0.02 K/UL (ref 0–0.12)
BILIRUB SERPL-MCNC: 0.4 MG/DL (ref 0.1–1.5)
BUN SERPL-MCNC: 17 MG/DL (ref 8–22)
CALCIUM SERPL-MCNC: 9.3 MG/DL (ref 8.5–10.5)
CHLORIDE SERPL-SCNC: 107 MMOL/L (ref 96–112)
CO2 SERPL-SCNC: 30 MMOL/L (ref 20–33)
CORTIS SERPL-MCNC: 8.2 UG/DL (ref 0–23)
CREAT SERPL-MCNC: 0.87 MG/DL (ref 0.5–1.4)
EOSINOPHIL # BLD AUTO: 0.17 K/UL (ref 0–0.51)
EOSINOPHIL # BLD AUTO: 0.18 K/UL (ref 0–0.51)
EOSINOPHIL NFR BLD: 7.9 % (ref 0–6.9)
EOSINOPHIL NFR BLD: 8.6 % (ref 0–6.9)
ERYTHROCYTE [DISTWIDTH] IN BLOOD BY AUTOMATED COUNT: 46.9 FL (ref 35.9–50)
ERYTHROCYTE [DISTWIDTH] IN BLOOD BY AUTOMATED COUNT: 47.6 FL (ref 35.9–50)
FERRITIN SERPL-MCNC: 43.7 NG/ML (ref 10–291)
GGT SERPL-CCNC: 7 U/L (ref 7–34)
GLOBULIN SER CALC-MCNC: 1.9 G/DL (ref 1.9–3.5)
GLUCOSE SERPL-MCNC: 91 MG/DL (ref 65–99)
HCT VFR BLD AUTO: 36.9 % (ref 37–47)
HCT VFR BLD AUTO: 37 % (ref 37–47)
HGB BLD-MCNC: 11.9 G/DL (ref 12–16)
HGB BLD-MCNC: 12 G/DL (ref 12–16)
IMM GRANULOCYTES # BLD AUTO: 0 K/UL (ref 0–0.11)
IMM GRANULOCYTES # BLD AUTO: 0.01 K/UL (ref 0–0.11)
IMM GRANULOCYTES NFR BLD AUTO: 0 % (ref 0–0.9)
IMM GRANULOCYTES NFR BLD AUTO: 0.5 % (ref 0–0.9)
IRON SATN MFR SERPL: 22 % (ref 15–55)
IRON SERPL-MCNC: 81 UG/DL (ref 40–170)
LYMPHOCYTES # BLD AUTO: 0.69 K/UL (ref 1–4.8)
LYMPHOCYTES # BLD AUTO: 0.73 K/UL (ref 1–4.8)
LYMPHOCYTES NFR BLD: 33 % (ref 22–41)
LYMPHOCYTES NFR BLD: 34 % (ref 22–41)
MCH RBC QN AUTO: 31.4 PG (ref 27–33)
MCH RBC QN AUTO: 31.5 PG (ref 27–33)
MCHC RBC AUTO-ENTMCNC: 32.2 G/DL (ref 33.6–35)
MCHC RBC AUTO-ENTMCNC: 32.4 G/DL (ref 33.6–35)
MCV RBC AUTO: 97.1 FL (ref 81.4–97.8)
MCV RBC AUTO: 97.4 FL (ref 81.4–97.8)
MONOCYTES # BLD AUTO: 0.23 K/UL (ref 0–0.85)
MONOCYTES # BLD AUTO: 0.23 K/UL (ref 0–0.85)
MONOCYTES NFR BLD AUTO: 10.7 % (ref 0–13.4)
MONOCYTES NFR BLD AUTO: 11 % (ref 0–13.4)
NEUTROPHILS # BLD AUTO: 0.97 K/UL (ref 2–7.15)
NEUTROPHILS # BLD AUTO: 1 K/UL (ref 2–7.15)
NEUTROPHILS NFR BLD: 46.4 % (ref 44–72)
NEUTROPHILS NFR BLD: 46.4 % (ref 44–72)
NRBC # BLD AUTO: 0 K/UL
NRBC # BLD AUTO: 0 K/UL
NRBC BLD-RTO: 0 /100 WBC
NRBC BLD-RTO: 0 /100 WBC
PLATELET # BLD AUTO: 87 K/UL (ref 164–446)
PLATELET # BLD AUTO: 90 K/UL (ref 164–446)
PMV BLD AUTO: 9.5 FL (ref 9–12.9)
PMV BLD AUTO: 9.9 FL (ref 9–12.9)
POTASSIUM SERPL-SCNC: 3.8 MMOL/L (ref 3.6–5.5)
PROT SERPL-MCNC: 5.6 G/DL (ref 6–8.2)
RBC # BLD AUTO: 3.79 M/UL (ref 4.2–5.4)
RBC # BLD AUTO: 3.81 M/UL (ref 4.2–5.4)
SODIUM SERPL-SCNC: 144 MMOL/L (ref 135–145)
TIBC SERPL-MCNC: 361 UG/DL (ref 250–450)
WBC # BLD AUTO: 2.2 K/UL (ref 4.8–10.8)
WBC # BLD AUTO: 2.2 K/UL (ref 4.8–10.8)

## 2019-05-21 PROCEDURE — 82784 ASSAY IGA/IGD/IGG/IGM EACH: CPT

## 2019-05-21 PROCEDURE — 82533 TOTAL CORTISOL: CPT

## 2019-05-21 PROCEDURE — 36415 COLL VENOUS BLD VENIPUNCTURE: CPT

## 2019-05-21 PROCEDURE — 82977 ASSAY OF GGT: CPT

## 2019-05-21 PROCEDURE — 80053 COMPREHEN METABOLIC PANEL: CPT

## 2019-05-21 PROCEDURE — 83540 ASSAY OF IRON: CPT

## 2019-05-21 PROCEDURE — 83550 IRON BINDING TEST: CPT

## 2019-05-21 PROCEDURE — 82728 ASSAY OF FERRITIN: CPT

## 2019-05-21 PROCEDURE — 82164 ANGIOTENSIN I ENZYME TEST: CPT

## 2019-05-21 PROCEDURE — 85025 COMPLETE CBC W/AUTO DIFF WBC: CPT

## 2019-05-23 LAB — ACE SERPL-CCNC: 15 U/L (ref 9–67)

## 2019-05-24 LAB
IGA SERPL-MCNC: 70 MG/DL (ref 68–408)
IGG SERPL-MCNC: 400 MG/DL (ref 768–1632)
IGM SERPL-MCNC: 28 MG/DL (ref 35–263)

## 2019-05-28 ENCOUNTER — HOSPITAL ENCOUNTER (OUTPATIENT)
Dept: LAB | Facility: MEDICAL CENTER | Age: 59
End: 2019-05-28
Attending: PHYSICIAN ASSISTANT
Payer: MEDICARE

## 2019-05-28 ENCOUNTER — HOSPITAL ENCOUNTER (OUTPATIENT)
Dept: LAB | Facility: MEDICAL CENTER | Age: 59
End: 2019-05-28
Attending: INTERNAL MEDICINE
Payer: MEDICARE

## 2019-05-28 ENCOUNTER — APPOINTMENT (OUTPATIENT)
Dept: RADIOLOGY | Facility: MEDICAL CENTER | Age: 59
End: 2019-05-28
Payer: MEDICARE

## 2019-05-28 LAB
25(OH)D3 SERPL-MCNC: 39 NG/ML (ref 30–100)
BASOPHILS # BLD AUTO: 1.4 % (ref 0–1.8)
BASOPHILS # BLD: 0.04 K/UL (ref 0–0.12)
EOSINOPHIL # BLD AUTO: 0.17 K/UL (ref 0–0.51)
EOSINOPHIL NFR BLD: 5.9 % (ref 0–6.9)
ERYTHROCYTE [DISTWIDTH] IN BLOOD BY AUTOMATED COUNT: 43.5 FL (ref 35.9–50)
FERRITIN SERPL-MCNC: 42.8 NG/ML (ref 10–291)
FOLATE SERPL-MCNC: 22.8 NG/ML
HCT VFR BLD AUTO: 36.9 % (ref 37–47)
HGB BLD-MCNC: 12.2 G/DL (ref 12–16)
IMM GRANULOCYTES # BLD AUTO: 0.01 K/UL (ref 0–0.11)
IMM GRANULOCYTES NFR BLD AUTO: 0.3 % (ref 0–0.9)
LYMPHOCYTES # BLD AUTO: 0.87 K/UL (ref 1–4.8)
LYMPHOCYTES NFR BLD: 30 % (ref 22–41)
MCH RBC QN AUTO: 30.7 PG (ref 27–33)
MCHC RBC AUTO-ENTMCNC: 33.1 G/DL (ref 33.6–35)
MCV RBC AUTO: 92.7 FL (ref 81.4–97.8)
MONOCYTES # BLD AUTO: 0.22 K/UL (ref 0–0.85)
MONOCYTES NFR BLD AUTO: 7.6 % (ref 0–13.4)
NEUTROPHILS # BLD AUTO: 1.59 K/UL (ref 2–7.15)
NEUTROPHILS NFR BLD: 54.8 % (ref 44–72)
NRBC # BLD AUTO: 0 K/UL
NRBC BLD-RTO: 0 /100 WBC
PLATELET # BLD AUTO: 107 K/UL (ref 164–446)
PMV BLD AUTO: 9.2 FL (ref 9–12.9)
RBC # BLD AUTO: 3.98 M/UL (ref 4.2–5.4)
VIT B12 SERPL-MCNC: 835 PG/ML (ref 211–911)
WBC # BLD AUTO: 2.9 K/UL (ref 4.8–10.8)

## 2019-05-28 PROCEDURE — 36415 COLL VENOUS BLD VENIPUNCTURE: CPT

## 2019-05-28 PROCEDURE — 84460 ALANINE AMINO (ALT) (SGPT): CPT

## 2019-05-28 PROCEDURE — 83550 IRON BINDING TEST: CPT

## 2019-05-28 PROCEDURE — 83735 ASSAY OF MAGNESIUM: CPT

## 2019-05-28 PROCEDURE — 84450 TRANSFERASE (AST) (SGOT): CPT

## 2019-05-28 PROCEDURE — 82374 ASSAY BLOOD CARBON DIOXIDE: CPT

## 2019-05-28 PROCEDURE — 83718 ASSAY OF LIPOPROTEIN: CPT

## 2019-05-28 PROCEDURE — 83540 ASSAY OF IRON: CPT

## 2019-05-28 PROCEDURE — 84155 ASSAY OF PROTEIN SERUM: CPT

## 2019-05-28 PROCEDURE — 82465 ASSAY BLD/SERUM CHOLESTEROL: CPT

## 2019-05-28 PROCEDURE — 84520 ASSAY OF UREA NITROGEN: CPT

## 2019-05-28 PROCEDURE — 84075 ASSAY ALKALINE PHOSPHATASE: CPT

## 2019-05-28 PROCEDURE — 84132 ASSAY OF SERUM POTASSIUM: CPT

## 2019-05-28 PROCEDURE — 85025 COMPLETE CBC W/AUTO DIFF WBC: CPT

## 2019-05-28 PROCEDURE — 82248 BILIRUBIN DIRECT: CPT

## 2019-05-28 PROCEDURE — 82525 ASSAY OF COPPER: CPT

## 2019-05-28 PROCEDURE — 84100 ASSAY OF PHOSPHORUS: CPT

## 2019-05-28 PROCEDURE — 82947 ASSAY GLUCOSE BLOOD QUANT: CPT

## 2019-05-28 PROCEDURE — 82247 BILIRUBIN TOTAL: CPT

## 2019-05-28 PROCEDURE — 82306 VITAMIN D 25 HYDROXY: CPT

## 2019-05-28 PROCEDURE — 83036 HEMOGLOBIN GLYCOSYLATED A1C: CPT

## 2019-05-28 PROCEDURE — 84466 ASSAY OF TRANSFERRIN: CPT

## 2019-05-28 PROCEDURE — 82746 ASSAY OF FOLIC ACID SERUM: CPT

## 2019-05-28 PROCEDURE — 82435 ASSAY OF BLOOD CHLORIDE: CPT

## 2019-05-28 PROCEDURE — 84425 ASSAY OF VITAMIN B-1: CPT

## 2019-05-28 PROCEDURE — 84295 ASSAY OF SERUM SODIUM: CPT

## 2019-05-28 PROCEDURE — 83721 ASSAY OF BLOOD LIPOPROTEIN: CPT

## 2019-05-28 PROCEDURE — 88184 FLOWCYTOMETRY/ TC 1 MARKER: CPT

## 2019-05-28 PROCEDURE — 82565 ASSAY OF CREATININE: CPT

## 2019-05-28 PROCEDURE — 82310 ASSAY OF CALCIUM: CPT

## 2019-05-28 PROCEDURE — 84478 ASSAY OF TRIGLYCERIDES: CPT

## 2019-05-28 PROCEDURE — 82607 VITAMIN B-12: CPT

## 2019-05-28 PROCEDURE — 82728 ASSAY OF FERRITIN: CPT

## 2019-05-28 PROCEDURE — 88185 FLOWCYTOMETRY/TC ADD-ON: CPT

## 2019-05-28 PROCEDURE — 82040 ASSAY OF SERUM ALBUMIN: CPT

## 2019-05-29 LAB
ALBUMIN SERPL BCP-MCNC: 4.1 G/DL (ref 3.2–4.9)
ALP SERPL-CCNC: 35 U/L (ref 30–99)
ALT SERPL-CCNC: 25 U/L (ref 2–50)
ANNOTATION COMMENT IMP: NORMAL
AST SERPL-CCNC: 38 U/L (ref 12–45)
BILIRUB CONJ SERPL-MCNC: <0.1 MG/DL (ref 0.1–0.5)
BILIRUB INDIRECT SERPL-MCNC: NORMAL MG/DL (ref 0–1)
BILIRUB SERPL-MCNC: 0.4 MG/DL (ref 0.1–1.5)
BUN SERPL-MCNC: 17 MG/DL (ref 8–22)
CALCIUM SERPL-MCNC: 8.9 MG/DL (ref 8.5–10.5)
CD19 CELLS NFR SPEC: 15 % (ref 6–23)
CD3 CELLS # BLD: 700 CELLS/UL (ref 570–2400)
CD3 CELLS NFR SPEC: 69 % (ref 62–87)
CD3+CD4+ CELLS # BLD: 486 CELLS/UL (ref 430–1800)
CD3+CD4+ CELLS NFR BLD: 48 % (ref 32–64)
CD3+CD4+ CELLS/CD3+CD8+ CLL BLD: 2.29 RATIO (ref 0.8–3.9)
CD3+CD8+ CELLS # BLD: 215 CELLS/UL (ref 210–1200)
CD3+CD8+ CELLS NFR SPEC: 21 % (ref 15–46)
CD3-CD16+CD56+ CELLS # SPEC: 152 CELLS/UL (ref 78–470)
CD3-CD16+CD56+ CELLS NFR SPEC: 15 % (ref 4–26)
CELLS.CD3-CD19+ [#/VOLUME] IN BLOOD: 157 CELLS/UL (ref 91–610)
CHLORIDE SERPL-SCNC: 106 MMOL/L (ref 96–112)
CHOLEST SERPL-MCNC: 142 MG/DL (ref 100–199)
CO2 SERPL-SCNC: 29 MMOL/L (ref 20–33)
COPPER SERPL-MCNC: 107.1 UG/DL (ref 80–155)
CREAT SERPL-MCNC: 0.81 MG/DL (ref 0.5–1.4)
EST. AVERAGE GLUCOSE BLD GHB EST-MCNC: 94 MG/DL
GLUCOSE SERPL-MCNC: 70 MG/DL (ref 65–99)
HBA1C MFR BLD: 4.9 % (ref 0–5.6)
HDLC SERPL-MCNC: 76 MG/DL
IRON SATN MFR SERPL: 36 % (ref 15–55)
IRON SERPL-MCNC: 129 UG/DL (ref 40–170)
MAGNESIUM SERPL-MCNC: 1.8 MG/DL (ref 1.5–2.5)
PHOSPHATE SERPL-MCNC: 2.9 MG/DL (ref 2.5–4.5)
POTASSIUM SERPL-SCNC: 3.8 MMOL/L (ref 3.6–5.5)
PROT SERPL-MCNC: 5.9 G/DL (ref 6–8.2)
SODIUM SERPL-SCNC: 144 MMOL/L (ref 135–145)
TIBC SERPL-MCNC: 363 UG/DL (ref 250–450)
TRANSFERRIN SERPL-MCNC: 273 MG/DL (ref 200–370)
TRIGL SERPL-MCNC: 67 MG/DL (ref 0–149)

## 2019-05-30 LAB — LDLC SERPL-MCNC: 53 MG/DL (ref 0–129)

## 2019-05-31 ENCOUNTER — HOSPITAL ENCOUNTER (OUTPATIENT)
Dept: LAB | Facility: MEDICAL CENTER | Age: 59
End: 2019-05-31
Attending: INTERNAL MEDICINE
Payer: MEDICARE

## 2019-05-31 LAB — VIT B1 BLD-MCNC: 140 NMOL/L (ref 70–180)

## 2019-05-31 PROCEDURE — 36415 COLL VENOUS BLD VENIPUNCTURE: CPT

## 2019-05-31 PROCEDURE — 80197 ASSAY OF TACROLIMUS: CPT

## 2019-06-01 LAB — TACROLIMUS BLD-MCNC: 1.9 NG/ML

## 2019-06-21 DIAGNOSIS — K59.03 DRUG-INDUCED CONSTIPATION: ICD-10-CM

## 2019-06-21 NOTE — TELEPHONE ENCOUNTER
Was the patient seen in the last year in this department? Yes  5/2/19    Does patient have an active prescription for medications requested? No     Received Request Via: Pharmacy

## 2019-06-22 RX ORDER — LACTULOSE 10 G/15ML
SOLUTION ORAL
Qty: 1800 ML | Refills: 2 | Status: SHIPPED | OUTPATIENT
Start: 2019-06-22 | End: 2019-08-20 | Stop reason: SDUPTHER

## 2019-06-28 RX ORDER — GABAPENTIN 600 MG/1
TABLET ORAL
Qty: 90 TAB | Refills: 1 | Status: SHIPPED | OUTPATIENT
Start: 2019-06-28 | End: 2019-08-28 | Stop reason: SDUPTHER

## 2019-07-05 DIAGNOSIS — J96.11 CHRONIC RESPIRATORY FAILURE WITH HYPOXIA (HCC): ICD-10-CM

## 2019-07-08 RX ORDER — TIOTROPIUM BROMIDE 18 UG/1
CAPSULE ORAL; RESPIRATORY (INHALATION)
Qty: 30 CAP | Refills: 6 | Status: ON HOLD | OUTPATIENT
Start: 2019-07-08 | End: 2021-08-03

## 2019-08-20 DIAGNOSIS — K59.03 DRUG-INDUCED CONSTIPATION: ICD-10-CM

## 2019-08-20 RX ORDER — LACTULOSE 10 G/15ML
SOLUTION ORAL
Qty: 1800 ML | Refills: 2 | Status: SHIPPED | OUTPATIENT
Start: 2019-08-20 | End: 2019-09-04 | Stop reason: SDUPTHER

## 2019-08-28 RX ORDER — GABAPENTIN 600 MG/1
TABLET ORAL
Qty: 90 TAB | Refills: 1 | Status: SHIPPED | OUTPATIENT
Start: 2019-08-28 | End: 2019-09-21 | Stop reason: SDUPTHER

## 2019-09-04 DIAGNOSIS — K59.03 DRUG-INDUCED CONSTIPATION: ICD-10-CM

## 2019-09-04 RX ORDER — LACTULOSE 10 G/15ML
20 SOLUTION ORAL 2 TIMES DAILY
Qty: 5400 ML | Refills: 2 | Status: ON HOLD | OUTPATIENT
Start: 2019-09-04 | End: 2021-08-03

## 2019-09-04 NOTE — TELEPHONE ENCOUNTER
Was the patient seen in the last year in this department? Yes 5/2/19    Does patient have an active prescription for medications requested? No     Received Request Via: Pharmacy-90 day request

## 2019-09-04 NOTE — TELEPHONE ENCOUNTER
Have we ever prescribed this med? Yes.  If yes, what date? 05/06/19([place])    Last OV: 03/14/19 with Dr Gaming  She is interested in decreasing her prednisone dosage.  Her prednisone is for her liver transplant and not her pulmonary disease.  She tells me that Dr. Canada feels she can be weaned off prednisone.  We will wean her slowly by 1 mg every 2 weeks.  She knows to increase her dosage if she once again becomes hypotensive.  We will see her back in 3 months or sooner if there are issues.    Next OV: No pending appt.    DX:    Medications:   Requested Prescriptions     Pending Prescriptions Disp Refills   • predniSONE (DELTASONE) 5 MG Tab [Pharmacy Med Name: PREDNISONE 5 MG TABLET]  3     Sig: TAKE 1 TABLET BY MOUTH EVERY DAY *TAKE WITH 1 MG FOR TOTAL OF 7 MG DAILY         Called pt and l/m. We have received a RX RF for prednisone. If she was having sxs or does she use this for emergency on hand?

## 2019-09-09 RX ORDER — PREDNISONE 5 MG/1
TABLET ORAL
Qty: 120 TAB | Refills: 3 | Status: ON HOLD | OUTPATIENT
Start: 2019-09-09 | End: 2021-08-03

## 2019-09-11 DIAGNOSIS — E03.9 ACQUIRED HYPOTHYROIDISM: ICD-10-CM

## 2019-09-11 RX ORDER — LEVOTHYROXINE SODIUM 137 UG/1
137 TABLET ORAL
Qty: 60 TAB | Refills: 2 | OUTPATIENT
Start: 2019-09-11

## 2019-09-18 DIAGNOSIS — I27.20 PULMONARY HYPERTENSION (HCC): ICD-10-CM

## 2019-09-19 RX ORDER — TADALAFIL 20 MG/1
TABLET ORAL
Qty: 30 TAB | Refills: 3 | Status: SHIPPED | OUTPATIENT
Start: 2019-09-19 | End: 2019-09-25 | Stop reason: SDUPTHER

## 2019-09-23 RX ORDER — GABAPENTIN 600 MG/1
TABLET ORAL
Qty: 90 TAB | Refills: 1 | Status: SHIPPED | OUTPATIENT
Start: 2019-09-23 | End: 2019-10-21 | Stop reason: SDUPTHER

## 2019-09-25 DIAGNOSIS — I27.20 PULMONARY HYPERTENSION (HCC): ICD-10-CM

## 2019-09-25 RX ORDER — TADALAFIL 20 MG/1
1 TABLET ORAL DAILY
Qty: 90 TAB | Refills: 3 | Status: ON HOLD | OUTPATIENT
Start: 2019-09-25 | End: 2021-08-03

## 2019-10-22 RX ORDER — GABAPENTIN 600 MG/1
TABLET ORAL
Qty: 90 TAB | Refills: 1 | Status: ON HOLD | OUTPATIENT
Start: 2019-10-22 | End: 2021-08-03

## 2020-04-24 ENCOUNTER — TELEPHONE (OUTPATIENT)
Dept: HEALTH INFORMATION MANAGEMENT | Facility: OTHER | Age: 60
End: 2020-04-24

## 2020-07-06 NOTE — PROGRESS NOTES
12 hour chart check and 2 RN skin check   Bcc Infiltrative Histology Text: The dermis contains distinctive tumor cell masses of various shapes and sizes composed of cells with large oval or elongated nuclei with relatively little cytoplasm.  The nuclei are homogenous.  There is no pronounced variation in size or intensity of staining and no significant anaplastic appearance.  The cells at the outer aspect of the tumor masses show palisading of nuclei.  Tumor masses are surrounded by a connective tissue stroma and in some areas there is retraction artifact of the tumor nodule away from the surrounding stroma.  The tumor nests are sharply angulated and demonstrating an infiltrating pattern extending deeply with surrounding fibrosis.

## 2020-11-05 NOTE — PROGRESS NOTES
PT SLEEPING INTERMITTENTLY IN BED. REPOSITIONING SELF FOR COMFORT. VSS. PT REPORTING FEELING LIKE HE COULDN'T TAKE A DEEP BREATH. SAT CHECKED, 96% ON RA HR 58. EP TO BE UPDATED. SITTER IN [place] FOR CONTINUOUS MONITORING.    Patient sleeping, no signs or symptoms of distress noted, no change from previous assessment.  Call light in reach, will monitor.

## 2020-12-28 NOTE — ED AVS SNAPSHOT
[employer] Access Code: Activation code not generated  Current [employer] Status: Active    [employer]  A secure, online tool to manage your health information     [email or web address]’s [employer]® is a secure, online tool that connects you to your personalized health information from the privacy of your home -- day or night - making it very easy for you to manage your healthcare. Once the activation process is completed, you can even access your medical information using the [employer] kacy, which is available for free in the Apple Kacy store or Google Play store.     [employer] provides the following levels of access (as shown below):   My Chart Features   [facility] Primary Care Doctor [facility]  Specialists [facility]  Urgent  Care Non-[facility]  Primary Care  Doctor   Email your healthcare team securely and privately 24/7 X X X X   Manage appointments: schedule your next appointment; view details of past/upcoming appointments X      Request prescription refills. X      View recent personal medical records, including lab and immunizations X X X X   View health record, including health history, allergies, medications X X X X   Read reports about your outpatient visits, procedures, consult and ER notes X X X X   See your discharge summary, which is a recap of your hospital and/or ER visit that includes your diagnosis, lab results, and care plan. X X       How to register for [employer]  1. Go to  [email or web address].  2. Click on the Sign Up Now box, which takes you to the New Member Sign Up page. You will need to provide the following information:  a. Enter your [employer] Access Code exactly as it appears at the top of this page. (You will not need to use this code after you’ve completed the sign-up process. If you do not sign up before the expiration date, you must request a new code.)   b. Enter your date of birth.   c. Enter your home email address.   d. Click Submit, and follow the next screen’s instructions.  3. Create a [employer] ID. This will  ----- Message from Leyla Manrique sent at 12/28/2020  8:50 AM CST -----  Contact: self  Patient is requesting a call back to get her bunion surgery scheduled along with her charley.  Call back at [phone] (home).  thanks     be your [employer] login ID and cannot be changed, so think of one that is secure and easy to remember.  4. Create a [employer] password. You can change your password at any time.  5. Enter your Password Reset Question and Answer. This can be used at a later time if you forget your password.   6. Enter your e-mail address. This allows you to receive e-mail notifications when new information is available in [employer].  7. Click Sign Up. You can now view your health information.    For assistance activating your [employer] account, call [phone]

## 2021-01-01 ENCOUNTER — TELEPHONE (OUTPATIENT)
Dept: MEDICAL GROUP | Facility: LAB | Age: 61
End: 2021-01-01

## 2021-01-01 ENCOUNTER — PHYSICAL THERAPY (OUTPATIENT)
Dept: PHYSICAL THERAPY | Facility: MEDICAL CENTER | Age: 61
End: 2021-01-01
Attending: STUDENT IN AN ORGANIZED HEALTH CARE EDUCATION/TRAINING PROGRAM
Payer: MEDICARE

## 2021-01-01 ENCOUNTER — TELEPHONE (OUTPATIENT)
Dept: NEUROLOGY | Facility: MEDICAL CENTER | Age: 61
End: 2021-01-01

## 2021-01-01 ENCOUNTER — APPOINTMENT (OUTPATIENT)
Dept: RADIOLOGY | Facility: MEDICAL CENTER | Age: 61
End: 2021-01-01
Attending: EMERGENCY MEDICINE
Payer: MEDICARE

## 2021-01-01 ENCOUNTER — TELEPHONE (OUTPATIENT)
Dept: SCHEDULING | Facility: IMAGING CENTER | Age: 61
End: 2021-01-01

## 2021-01-01 ENCOUNTER — APPOINTMENT (OUTPATIENT)
Dept: RADIOLOGY | Facility: MEDICAL CENTER | Age: 61
DRG: 640 | End: 2021-01-01
Attending: EMERGENCY MEDICINE
Payer: MEDICARE

## 2021-01-01 ENCOUNTER — APPOINTMENT (OUTPATIENT)
Dept: RADIOLOGY | Facility: MEDICAL CENTER | Age: 61
DRG: 640 | End: 2021-01-01
Attending: FAMILY MEDICINE
Payer: MEDICARE

## 2021-01-01 ENCOUNTER — HOSPITAL ENCOUNTER (OUTPATIENT)
Dept: LAB | Facility: MEDICAL CENTER | Age: 61
End: 2021-12-18
Attending: INTERNAL MEDICINE
Payer: MEDICARE

## 2021-01-01 ENCOUNTER — TELEPHONE (OUTPATIENT)
Dept: PHYSICAL THERAPY | Facility: MEDICAL CENTER | Age: 61
End: 2021-01-01

## 2021-01-01 ENCOUNTER — HOSPITAL ENCOUNTER (OUTPATIENT)
Dept: RADIOLOGY | Facility: MEDICAL CENTER | Age: 61
End: 2021-12-29
Attending: FAMILY MEDICINE
Payer: MEDICARE

## 2021-01-01 ENCOUNTER — APPOINTMENT (OUTPATIENT)
Dept: CARDIOLOGY | Facility: MEDICAL CENTER | Age: 61
DRG: 640 | End: 2021-01-01
Attending: STUDENT IN AN ORGANIZED HEALTH CARE EDUCATION/TRAINING PROGRAM
Payer: MEDICARE

## 2021-01-01 ENCOUNTER — TELEPHONE (OUTPATIENT)
Dept: CARDIOLOGY | Facility: MEDICAL CENTER | Age: 61
End: 2021-01-01

## 2021-01-01 ENCOUNTER — PATIENT OUTREACH (OUTPATIENT)
Dept: MEDICAL GROUP | Age: 61
End: 2021-01-01

## 2021-01-01 ENCOUNTER — HOSPITAL ENCOUNTER (OUTPATIENT)
Dept: LAB | Facility: MEDICAL CENTER | Age: 61
End: 2021-12-22
Attending: INTERNAL MEDICINE
Payer: MEDICARE

## 2021-01-01 ENCOUNTER — OFFICE VISIT (OUTPATIENT)
Dept: MEDICAL GROUP | Facility: LAB | Age: 61
End: 2021-01-01
Payer: MEDICARE

## 2021-01-01 ENCOUNTER — TELEPHONE (OUTPATIENT)
Dept: HEALTH INFORMATION MANAGEMENT | Facility: OTHER | Age: 61
End: 2021-01-01

## 2021-01-01 ENCOUNTER — APPOINTMENT (OUTPATIENT)
Dept: RADIOLOGY | Facility: MEDICAL CENTER | Age: 61
End: 2021-01-01
Attending: FAMILY MEDICINE
Payer: MEDICARE

## 2021-01-01 ENCOUNTER — HOSPITAL ENCOUNTER (EMERGENCY)
Facility: MEDICAL CENTER | Age: 61
End: 2021-11-11
Attending: EMERGENCY MEDICINE
Payer: MEDICARE

## 2021-01-01 ENCOUNTER — HOSPITAL ENCOUNTER (INPATIENT)
Facility: MEDICAL CENTER | Age: 61
LOS: 2 days | DRG: 640 | End: 2021-12-27
Attending: EMERGENCY MEDICINE | Admitting: STUDENT IN AN ORGANIZED HEALTH CARE EDUCATION/TRAINING PROGRAM
Payer: MEDICARE

## 2021-01-01 ENCOUNTER — PATIENT OUTREACH (OUTPATIENT)
Dept: HEALTH INFORMATION MANAGEMENT | Facility: OTHER | Age: 61
End: 2021-01-01

## 2021-01-01 ENCOUNTER — HOSPITAL ENCOUNTER (OUTPATIENT)
Dept: RADIOLOGY | Facility: MEDICAL CENTER | Age: 61
End: 2021-12-15
Attending: FAMILY MEDICINE
Payer: MEDICARE

## 2021-01-01 VITALS
TEMPERATURE: 98.8 F | OXYGEN SATURATION: 100 % | SYSTOLIC BLOOD PRESSURE: 135 MMHG | RESPIRATION RATE: 16 BRPM | HEIGHT: 69 IN | HEART RATE: 63 BPM | WEIGHT: 156.97 LBS | BODY MASS INDEX: 23.25 KG/M2 | DIASTOLIC BLOOD PRESSURE: 78 MMHG

## 2021-01-01 VITALS
DIASTOLIC BLOOD PRESSURE: 72 MMHG | BODY MASS INDEX: 23.25 KG/M2 | TEMPERATURE: 98.3 F | HEIGHT: 69 IN | OXYGEN SATURATION: 96 % | RESPIRATION RATE: 12 BRPM | HEART RATE: 62 BPM | WEIGHT: 157 LBS | SYSTOLIC BLOOD PRESSURE: 118 MMHG

## 2021-01-01 VITALS
BODY MASS INDEX: 21.18 KG/M2 | HEIGHT: 69 IN | HEART RATE: 68 BPM | DIASTOLIC BLOOD PRESSURE: 70 MMHG | TEMPERATURE: 97.8 F | SYSTOLIC BLOOD PRESSURE: 120 MMHG | RESPIRATION RATE: 12 BRPM | WEIGHT: 143 LBS | OXYGEN SATURATION: 97 %

## 2021-01-01 VITALS
SYSTOLIC BLOOD PRESSURE: 116 MMHG | HEIGHT: 68 IN | HEART RATE: 86 BPM | OXYGEN SATURATION: 97 % | TEMPERATURE: 98.3 F | DIASTOLIC BLOOD PRESSURE: 63 MMHG | BODY MASS INDEX: 22.52 KG/M2 | WEIGHT: 148.59 LBS | RESPIRATION RATE: 18 BRPM

## 2021-01-01 DIAGNOSIS — K76.81 HEPATOPULMONARY SYNDROME (HCC): ICD-10-CM

## 2021-01-01 DIAGNOSIS — R42 DIZZINESS: ICD-10-CM

## 2021-01-01 DIAGNOSIS — J84.9 INTERSTITIAL LUNG DISEASE (HCC): ICD-10-CM

## 2021-01-01 DIAGNOSIS — E87.0 HYPERNATREMIA: ICD-10-CM

## 2021-01-01 DIAGNOSIS — R79.89 ELEVATED TROPONIN: ICD-10-CM

## 2021-01-01 DIAGNOSIS — D47.2 MGUS (MONOCLONAL GAMMOPATHY OF UNKNOWN SIGNIFICANCE): ICD-10-CM

## 2021-01-01 DIAGNOSIS — Z94.4 STATUS POST LIVER TRANSPLANT (HCC): ICD-10-CM

## 2021-01-01 DIAGNOSIS — T38.0X5A STEROID-INDUCED HYPERGLYCEMIA: ICD-10-CM

## 2021-01-01 DIAGNOSIS — E78.01 FAMILIAL HYPERCHOLESTEROLEMIA: ICD-10-CM

## 2021-01-01 DIAGNOSIS — Z79.899 HIGH RISK MEDICATION USE: ICD-10-CM

## 2021-01-01 DIAGNOSIS — R56.9 SEIZURES (HCC): ICD-10-CM

## 2021-01-01 DIAGNOSIS — D61.818 PANCYTOPENIA (HCC): ICD-10-CM

## 2021-01-01 DIAGNOSIS — L03.115 CELLULITIS OF RIGHT LOWER EXTREMITY: ICD-10-CM

## 2021-01-01 DIAGNOSIS — Z12.31 ENCOUNTER FOR SCREENING MAMMOGRAM FOR BREAST CANCER: ICD-10-CM

## 2021-01-01 DIAGNOSIS — R73.9 STEROID-INDUCED HYPERGLYCEMIA: ICD-10-CM

## 2021-01-01 DIAGNOSIS — R79.89 ELEVATED D-DIMER: ICD-10-CM

## 2021-01-01 DIAGNOSIS — S06.5X0A TRAUMATIC SUBDURAL HEMATOMA WITHOUT LOSS OF CONSCIOUSNESS, INITIAL ENCOUNTER (HCC): ICD-10-CM

## 2021-01-01 DIAGNOSIS — E87.79 OTHER HYPERVOLEMIA: ICD-10-CM

## 2021-01-01 DIAGNOSIS — I25.2 H/O NON-ST ELEVATION MYOCARDIAL INFARCTION (NSTEMI): ICD-10-CM

## 2021-01-01 DIAGNOSIS — E87.6 HYPOKALEMIA: ICD-10-CM

## 2021-01-01 DIAGNOSIS — E78.00 PURE HYPERCHOLESTEROLEMIA: ICD-10-CM

## 2021-01-01 DIAGNOSIS — I34.0 MILD MITRAL REGURGITATION: ICD-10-CM

## 2021-01-01 DIAGNOSIS — R06.02 SOB (SHORTNESS OF BREATH): ICD-10-CM

## 2021-01-01 DIAGNOSIS — R16.1 SPLENOMEGALY: ICD-10-CM

## 2021-01-01 DIAGNOSIS — I50.9 ACUTE CONGESTIVE HEART FAILURE, UNSPECIFIED HEART FAILURE TYPE (HCC): ICD-10-CM

## 2021-01-01 DIAGNOSIS — R68.89 INCREASED OXYGEN DEMAND: ICD-10-CM

## 2021-01-01 DIAGNOSIS — N18.30 CKD (CHRONIC KIDNEY DISEASE) STAGE 3, GFR 30-59 ML/MIN: ICD-10-CM

## 2021-01-01 DIAGNOSIS — S06.5XAA SUBDURAL HEMATOMA (HCC): ICD-10-CM

## 2021-01-01 DIAGNOSIS — I27.0 PRIMARY PULMONARY HYPERTENSION (HCC): ICD-10-CM

## 2021-01-01 DIAGNOSIS — R92.8 ABNORMAL MAMMOGRAM: ICD-10-CM

## 2021-01-01 DIAGNOSIS — R60.9 PERIPHERAL EDEMA: ICD-10-CM

## 2021-01-01 DIAGNOSIS — D69.6 THROMBOCYTOPENIA (HCC): ICD-10-CM

## 2021-01-01 DIAGNOSIS — E27.40 ADRENAL INSUFFICIENCY (HCC): ICD-10-CM

## 2021-01-01 DIAGNOSIS — Z23 NEED FOR VACCINATION: ICD-10-CM

## 2021-01-01 DIAGNOSIS — Z86.2 HISTORY OF SARCOIDOSIS: ICD-10-CM

## 2021-01-01 DIAGNOSIS — S09.90XA CLOSED HEAD INJURY, INITIAL ENCOUNTER: ICD-10-CM

## 2021-01-01 DIAGNOSIS — R79.89 ELEVATED BRAIN NATRIURETIC PEPTIDE (BNP) LEVEL: ICD-10-CM

## 2021-01-01 DIAGNOSIS — I35.1 AORTIC VALVE INSUFFICIENCY, ETIOLOGY OF CARDIAC VALVE DISEASE UNSPECIFIED: ICD-10-CM

## 2021-01-01 DIAGNOSIS — S52.021A DISPLACED FRACTURE OF OLECRANON PROCESS WITHOUT INTRAARTICULAR EXTENSION OF RIGHT ULNA, INITIAL ENCOUNTER FOR CLOSED FRACTURE: ICD-10-CM

## 2021-01-01 LAB
ACTH PLAS-MCNC: 65.5 PG/ML (ref 7.2–63.3)
ALBUMIN SERPL BCP-MCNC: 2.9 G/DL (ref 3.2–4.9)
ALBUMIN SERPL BCP-MCNC: 2.9 G/DL (ref 3.2–4.9)
ALBUMIN SERPL BCP-MCNC: 3 G/DL (ref 3.2–4.9)
ALBUMIN SERPL BCP-MCNC: 3.2 G/DL (ref 3.2–4.9)
ALBUMIN SERPL BCP-MCNC: 3.4 G/DL (ref 3.2–4.9)
ALBUMIN SERPL BCP-MCNC: 3.4 G/DL (ref 3.2–4.9)
ALBUMIN/GLOB SERPL: 1.4 G/DL
ALBUMIN/GLOB SERPL: 1.4 G/DL
ALBUMIN/GLOB SERPL: 1.5 G/DL
ALBUMIN/GLOB SERPL: 1.5 G/DL
ALBUMIN/GLOB SERPL: 1.6 G/DL
ALBUMIN/GLOB SERPL: 1.8 G/DL
ALP SERPL-CCNC: 51 U/L (ref 30–99)
ALP SERPL-CCNC: 52 U/L (ref 30–99)
ALP SERPL-CCNC: 53 U/L (ref 30–99)
ALP SERPL-CCNC: 54 U/L (ref 30–99)
ALP SERPL-CCNC: 54 U/L (ref 30–99)
ALP SERPL-CCNC: 56 U/L (ref 30–99)
ALT SERPL-CCNC: 20 U/L (ref 2–50)
ALT SERPL-CCNC: 21 U/L (ref 2–50)
ALT SERPL-CCNC: 22 U/L (ref 2–50)
ALT SERPL-CCNC: 23 U/L (ref 2–50)
ALT SERPL-CCNC: 24 U/L (ref 2–50)
ALT SERPL-CCNC: 27 U/L (ref 2–50)
ANION GAP SERPL CALC-SCNC: 10 MMOL/L (ref 7–16)
ANION GAP SERPL CALC-SCNC: 7 MMOL/L (ref 7–16)
ANION GAP SERPL CALC-SCNC: 8 MMOL/L (ref 7–16)
ANION GAP SERPL CALC-SCNC: 9 MMOL/L (ref 7–16)
ANION GAP SERPL CALC-SCNC: 9 MMOL/L (ref 7–16)
APPEARANCE UR: CLEAR
APTT PPP: 29 SEC (ref 24.7–36)
AST SERPL-CCNC: 28 U/L (ref 12–45)
AST SERPL-CCNC: 29 U/L (ref 12–45)
AST SERPL-CCNC: 31 U/L (ref 12–45)
AST SERPL-CCNC: 31 U/L (ref 12–45)
AST SERPL-CCNC: 43 U/L (ref 12–45)
AST SERPL-CCNC: 46 U/L (ref 12–45)
BACTERIA BLD CULT: NORMAL
BACTERIA BLD CULT: NORMAL
BACTERIA WND AEROBE CULT: NORMAL
BASOPHILS # BLD AUTO: 0.4 % (ref 0–1.8)
BASOPHILS # BLD AUTO: 0.5 % (ref 0–1.8)
BASOPHILS # BLD AUTO: 0.7 % (ref 0–1.8)
BASOPHILS # BLD AUTO: 0.9 % (ref 0–1.8)
BASOPHILS # BLD: 0.01 K/UL (ref 0–0.12)
BASOPHILS # BLD: 0.01 K/UL (ref 0–0.12)
BASOPHILS # BLD: 0.02 K/UL (ref 0–0.12)
BASOPHILS # BLD: 0.02 K/UL (ref 0–0.12)
BILIRUB SERPL-MCNC: 0.3 MG/DL (ref 0.1–1.5)
BILIRUB SERPL-MCNC: 0.4 MG/DL (ref 0.1–1.5)
BILIRUB UR QL STRIP.AUTO: NEGATIVE
BUN SERPL-MCNC: 7 MG/DL (ref 8–22)
BUN SERPL-MCNC: 7 MG/DL (ref 8–22)
BUN SERPL-MCNC: 8 MG/DL (ref 8–22)
BUN SERPL-MCNC: 9 MG/DL (ref 8–22)
CALCIUM SERPL-MCNC: 7.9 MG/DL (ref 8.4–10.2)
CALCIUM SERPL-MCNC: 8.1 MG/DL (ref 8.5–10.5)
CALCIUM SERPL-MCNC: 8.2 MG/DL (ref 8.4–10.2)
CALCIUM SERPL-MCNC: 8.2 MG/DL (ref 8.4–10.2)
CALCIUM SERPL-MCNC: 8.2 MG/DL (ref 8.5–10.5)
CALCIUM SERPL-MCNC: 8.9 MG/DL (ref 8.5–10.5)
CALCIUM SERPL-MCNC: 8.9 MG/DL (ref 8.5–10.5)
CHLORIDE SERPL-SCNC: 103 MMOL/L (ref 96–112)
CHLORIDE SERPL-SCNC: 104 MMOL/L (ref 96–112)
CHLORIDE SERPL-SCNC: 105 MMOL/L (ref 96–112)
CHLORIDE SERPL-SCNC: 106 MMOL/L (ref 96–112)
CHOLEST SERPL-MCNC: 122 MG/DL (ref 100–199)
CO2 SERPL-SCNC: 28 MMOL/L (ref 20–33)
CO2 SERPL-SCNC: 30 MMOL/L (ref 20–33)
CO2 SERPL-SCNC: 31 MMOL/L (ref 20–33)
CO2 SERPL-SCNC: 31 MMOL/L (ref 20–33)
CO2 SERPL-SCNC: 32 MMOL/L (ref 20–33)
CO2 SERPL-SCNC: 32 MMOL/L (ref 20–33)
CO2 SERPL-SCNC: 33 MMOL/L (ref 20–33)
COLOR UR: YELLOW
CREAT SERPL-MCNC: 0.58 MG/DL (ref 0.5–1.4)
CREAT SERPL-MCNC: 0.59 MG/DL (ref 0.5–1.4)
CREAT SERPL-MCNC: 0.59 MG/DL (ref 0.5–1.4)
CREAT SERPL-MCNC: 0.62 MG/DL (ref 0.5–1.4)
CREAT SERPL-MCNC: 0.63 MG/DL (ref 0.5–1.4)
CREAT SERPL-MCNC: 0.64 MG/DL (ref 0.5–1.4)
CREAT SERPL-MCNC: 0.73 MG/DL (ref 0.5–1.4)
D DIMER PPP IA.FEU-MCNC: 1.29 UG/ML (FEU) (ref 0–0.5)
EKG IMPRESSION: NORMAL
EOSINOPHIL # BLD AUTO: 0.1 K/UL (ref 0–0.51)
EOSINOPHIL # BLD AUTO: 0.1 K/UL (ref 0–0.51)
EOSINOPHIL # BLD AUTO: 0.11 K/UL (ref 0–0.51)
EOSINOPHIL # BLD AUTO: 0.12 K/UL (ref 0–0.51)
EOSINOPHIL NFR BLD: 4.2 % (ref 0–6.9)
EOSINOPHIL NFR BLD: 4.4 % (ref 0–6.9)
EOSINOPHIL NFR BLD: 4.9 % (ref 0–6.9)
EOSINOPHIL NFR BLD: 5 % (ref 0–6.9)
ERYTHROCYTE [DISTWIDTH] IN BLOOD BY AUTOMATED COUNT: 45.2 FL (ref 35.9–50)
ERYTHROCYTE [DISTWIDTH] IN BLOOD BY AUTOMATED COUNT: 45.8 FL (ref 35.9–50)
ERYTHROCYTE [DISTWIDTH] IN BLOOD BY AUTOMATED COUNT: 46.3 FL (ref 35.9–50)
ERYTHROCYTE [DISTWIDTH] IN BLOOD BY AUTOMATED COUNT: 46.7 FL (ref 35.9–50)
FASTING STATUS PATIENT QL REPORTED: NORMAL
FERRITIN SERPL-MCNC: 15.5 NG/ML (ref 10–291)
FLUAV RNA SPEC QL NAA+PROBE: NEGATIVE
FLUBV RNA SPEC QL NAA+PROBE: NEGATIVE
GGT SERPL-CCNC: 18 U/L (ref 7–34)
GLOBULIN SER CALC-MCNC: 1.6 G/DL (ref 1.9–3.5)
GLOBULIN SER CALC-MCNC: 2.1 G/DL (ref 1.9–3.5)
GLOBULIN SER CALC-MCNC: 2.2 G/DL (ref 1.9–3.5)
GLOBULIN SER CALC-MCNC: 2.3 G/DL (ref 1.9–3.5)
GLUCOSE SERPL-MCNC: 138 MG/DL (ref 65–99)
GLUCOSE SERPL-MCNC: 76 MG/DL (ref 65–99)
GLUCOSE SERPL-MCNC: 77 MG/DL (ref 65–99)
GLUCOSE SERPL-MCNC: 80 MG/DL (ref 65–99)
GLUCOSE SERPL-MCNC: 89 MG/DL (ref 65–99)
GLUCOSE SERPL-MCNC: 90 MG/DL (ref 65–99)
GLUCOSE SERPL-MCNC: 91 MG/DL (ref 65–99)
GLUCOSE UR STRIP.AUTO-MCNC: NEGATIVE MG/DL
GRAM STN SPEC: NORMAL
GRAM STN SPEC: NORMAL
HCT VFR BLD AUTO: 27.3 % (ref 37–47)
HCT VFR BLD AUTO: 29 % (ref 37–47)
HCT VFR BLD AUTO: 29.4 % (ref 37–47)
HCT VFR BLD AUTO: 32.3 % (ref 37–47)
HDLC SERPL-MCNC: 55 MG/DL
HGB BLD-MCNC: 10.3 G/DL (ref 12–16)
HGB BLD-MCNC: 8.4 G/DL (ref 12–16)
HGB BLD-MCNC: 8.8 G/DL (ref 12–16)
HGB BLD-MCNC: 8.8 G/DL (ref 12–16)
IMM GRANULOCYTES # BLD AUTO: 0 K/UL (ref 0–0.11)
IMM GRANULOCYTES # BLD AUTO: 0 K/UL (ref 0–0.11)
IMM GRANULOCYTES # BLD AUTO: 0.01 K/UL (ref 0–0.11)
IMM GRANULOCYTES # BLD AUTO: 0.01 K/UL (ref 0–0.11)
IMM GRANULOCYTES NFR BLD AUTO: 0 % (ref 0–0.9)
IMM GRANULOCYTES NFR BLD AUTO: 0 % (ref 0–0.9)
IMM GRANULOCYTES NFR BLD AUTO: 0.3 % (ref 0–0.9)
IMM GRANULOCYTES NFR BLD AUTO: 0.5 % (ref 0–0.9)
INR PPP: 1.15 (ref 0.87–1.13)
IRON SATN MFR SERPL: 13 % (ref 15–55)
IRON SERPL-MCNC: 34 UG/DL (ref 40–170)
KETONES UR STRIP.AUTO-MCNC: NEGATIVE MG/DL
LDLC SERPL CALC-MCNC: 50 MG/DL
LEUKOCYTE ESTERASE UR QL STRIP.AUTO: NEGATIVE
LV EJECT FRACT  99904: 65
LV EJECT FRACT MOD 2C 99903: 79.74
LV EJECT FRACT MOD 4C 99902: 65.67
LV EJECT FRACT MOD BP 99901: 72.43
LYMPHOCYTES # BLD AUTO: 0.76 K/UL (ref 1–4.8)
LYMPHOCYTES # BLD AUTO: 0.77 K/UL (ref 1–4.8)
LYMPHOCYTES # BLD AUTO: 0.86 K/UL (ref 1–4.8)
LYMPHOCYTES # BLD AUTO: 1.01 K/UL (ref 1–4.8)
LYMPHOCYTES NFR BLD: 30.1 % (ref 22–41)
LYMPHOCYTES NFR BLD: 33.8 % (ref 22–41)
LYMPHOCYTES NFR BLD: 37.3 % (ref 22–41)
LYMPHOCYTES NFR BLD: 45.7 % (ref 22–41)
MAGNESIUM SERPL-MCNC: 1.5 MG/DL (ref 1.5–2.5)
MAGNESIUM SERPL-MCNC: 1.6 MG/DL (ref 1.5–2.5)
MCH RBC QN AUTO: 26.4 PG (ref 27–33)
MCH RBC QN AUTO: 27 PG (ref 27–33)
MCH RBC QN AUTO: 27.2 PG (ref 27–33)
MCH RBC QN AUTO: 28 PG (ref 27–33)
MCHC RBC AUTO-ENTMCNC: 29.9 G/DL (ref 33.6–35)
MCHC RBC AUTO-ENTMCNC: 30.3 G/DL (ref 33.6–35)
MCHC RBC AUTO-ENTMCNC: 30.8 G/DL (ref 33.6–35)
MCHC RBC AUTO-ENTMCNC: 31.9 G/DL (ref 33.6–35)
MCV RBC AUTO: 87.8 FL (ref 81.4–97.8)
MCV RBC AUTO: 88.3 FL (ref 81.4–97.8)
MCV RBC AUTO: 88.3 FL (ref 81.4–97.8)
MCV RBC AUTO: 89 FL (ref 81.4–97.8)
MICRO URNS: NORMAL
MONOCYTES # BLD AUTO: 0.22 K/UL (ref 0–0.85)
MONOCYTES # BLD AUTO: 0.26 K/UL (ref 0–0.85)
MONOCYTES # BLD AUTO: 0.29 K/UL (ref 0–0.85)
MONOCYTES # BLD AUTO: 0.35 K/UL (ref 0–0.85)
MONOCYTES NFR BLD AUTO: 10 % (ref 0–13.4)
MONOCYTES NFR BLD AUTO: 10.1 % (ref 0–13.4)
MONOCYTES NFR BLD AUTO: 11.4 % (ref 0–13.4)
MONOCYTES NFR BLD AUTO: 17.2 % (ref 0–13.4)
MRSA DNA SPEC QL NAA+PROBE: NORMAL
NEUTROPHILS # BLD AUTO: 0.82 K/UL (ref 2–7.15)
NEUTROPHILS # BLD AUTO: 0.84 K/UL (ref 2–7.15)
NEUTROPHILS # BLD AUTO: 1.14 K/UL (ref 2–7.15)
NEUTROPHILS # BLD AUTO: 1.56 K/UL (ref 2–7.15)
NEUTROPHILS NFR BLD: 37.9 % (ref 44–72)
NEUTROPHILS NFR BLD: 40.1 % (ref 44–72)
NEUTROPHILS NFR BLD: 50 % (ref 44–72)
NEUTROPHILS NFR BLD: 54.6 % (ref 44–72)
NITRITE UR QL STRIP.AUTO: NEGATIVE
NRBC # BLD AUTO: 0 K/UL
NRBC BLD-RTO: 0 /100 WBC
NT-PROBNP SERPL IA-MCNC: 1366 PG/ML (ref 0–125)
PH UR STRIP.AUTO: 6.5 [PH] (ref 5–8)
PHOSPHATE SERPL-MCNC: 3.2 MG/DL (ref 2.5–4.5)
PLATELET # BLD AUTO: 102 K/UL (ref 164–446)
PLATELET # BLD AUTO: 103 K/UL (ref 164–446)
PLATELET # BLD AUTO: 88 K/UL (ref 164–446)
PLATELET # BLD AUTO: 90 K/UL (ref 164–446)
PMV BLD AUTO: 10.2 FL (ref 9–12.9)
PMV BLD AUTO: 10.4 FL (ref 9–12.9)
PMV BLD AUTO: 11 FL (ref 9–12.9)
PMV BLD AUTO: 11 FL (ref 9–12.9)
POTASSIUM SERPL-SCNC: 2.6 MMOL/L (ref 3.6–5.5)
POTASSIUM SERPL-SCNC: 2.6 MMOL/L (ref 3.6–5.5)
POTASSIUM SERPL-SCNC: 3 MMOL/L (ref 3.6–5.5)
POTASSIUM SERPL-SCNC: 3.1 MMOL/L (ref 3.6–5.5)
POTASSIUM SERPL-SCNC: 3.6 MMOL/L (ref 3.6–5.5)
POTASSIUM SERPL-SCNC: 3.7 MMOL/L (ref 3.6–5.5)
POTASSIUM SERPL-SCNC: 3.9 MMOL/L (ref 3.6–5.5)
PROCALCITONIN SERPL-MCNC: 0.07 NG/ML
PROT SERPL-MCNC: 4.5 G/DL (ref 6–8.2)
PROT SERPL-MCNC: 5 G/DL (ref 6–8.2)
PROT SERPL-MCNC: 5.2 G/DL (ref 6–8.2)
PROT SERPL-MCNC: 5.3 G/DL (ref 6–8.2)
PROT SERPL-MCNC: 5.5 G/DL (ref 6–8.2)
PROT SERPL-MCNC: 5.7 G/DL (ref 6–8.2)
PROT UR QL STRIP: NEGATIVE MG/DL
PROTHROMBIN TIME: 13.8 SEC (ref 12–14.6)
RBC # BLD AUTO: 3.09 M/UL (ref 4.2–5.4)
RBC # BLD AUTO: 3.26 M/UL (ref 4.2–5.4)
RBC # BLD AUTO: 3.33 M/UL (ref 4.2–5.4)
RBC # BLD AUTO: 3.68 M/UL (ref 4.2–5.4)
RBC UR QL AUTO: NEGATIVE
RSV RNA SPEC QL NAA+PROBE: NEGATIVE
SARS-COV-2 RNA RESP QL NAA+PROBE: NOTDETECTED
SIGNIFICANT IND 70042: NORMAL
SITE SITE: NORMAL
SODIUM SERPL-SCNC: 142 MMOL/L (ref 135–145)
SODIUM SERPL-SCNC: 144 MMOL/L (ref 135–145)
SODIUM SERPL-SCNC: 145 MMOL/L (ref 135–145)
SODIUM SERPL-SCNC: 146 MMOL/L (ref 135–145)
SODIUM SERPL-SCNC: 146 MMOL/L (ref 135–145)
SOURCE SOURCE: NORMAL
SP GR UR STRIP.AUTO: 1.02
SPECIMEN SOURCE: NORMAL
T4 FREE SERPL-MCNC: 1.17 NG/DL (ref 0.93–1.7)
TACROLIMUS BLD-MCNC: 3.3 NG/ML
TIBC SERPL-MCNC: 257 UG/DL (ref 250–450)
TRIGL SERPL-MCNC: 83 MG/DL (ref 0–149)
TROPONIN T SERPL-MCNC: 25 NG/L (ref 6–19)
TSH SERPL DL<=0.005 MIU/L-ACNC: 1.13 UIU/ML (ref 0.38–5.33)
UIBC SERPL-MCNC: 223 UG/DL (ref 110–370)
VIT B12 SERPL-MCNC: 368 PG/ML (ref 211–911)
WBC # BLD AUTO: 2 K/UL (ref 4.8–10.8)
WBC # BLD AUTO: 2.2 K/UL (ref 4.8–10.8)
WBC # BLD AUTO: 2.3 K/UL (ref 4.8–10.8)
WBC # BLD AUTO: 2.9 K/UL (ref 4.8–10.8)

## 2021-01-01 PROCEDURE — 85025 COMPLETE CBC W/AUTO DIFF WBC: CPT

## 2021-01-01 PROCEDURE — 80053 COMPREHEN METABOLIC PANEL: CPT

## 2021-01-01 PROCEDURE — 96374 THER/PROPH/DIAG INJ IV PUSH: CPT

## 2021-01-01 PROCEDURE — 71275 CT ANGIOGRAPHY CHEST: CPT | Mod: ME

## 2021-01-01 PROCEDURE — 93970 EXTREMITY STUDY: CPT

## 2021-01-01 PROCEDURE — 73030 X-RAY EXAM OF SHOULDER: CPT | Mod: LT

## 2021-01-01 PROCEDURE — 72125 CT NECK SPINE W/O DYE: CPT | Mod: ME

## 2021-01-01 PROCEDURE — 83735 ASSAY OF MAGNESIUM: CPT

## 2021-01-01 PROCEDURE — 36415 COLL VENOUS BLD VENIPUNCTURE: CPT

## 2021-01-01 PROCEDURE — 80197 ASSAY OF TACROLIMUS: CPT

## 2021-01-01 PROCEDURE — 85379 FIBRIN DEGRADATION QUANT: CPT

## 2021-01-01 PROCEDURE — 71045 X-RAY EXAM CHEST 1 VIEW: CPT

## 2021-01-01 PROCEDURE — 87070 CULTURE OTHR SPECIMN AEROBIC: CPT

## 2021-01-01 PROCEDURE — 93306 TTE W/DOPPLER COMPLETE: CPT | Mod: 26 | Performed by: INTERNAL MEDICINE

## 2021-01-01 PROCEDURE — 85730 THROMBOPLASTIN TIME PARTIAL: CPT

## 2021-01-01 PROCEDURE — 700111 HCHG RX REV CODE 636 W/ 250 OVERRIDE (IP): Performed by: HOSPITALIST

## 2021-01-01 PROCEDURE — 99214 OFFICE O/P EST MOD 30 MIN: CPT | Performed by: FAMILY MEDICINE

## 2021-01-01 PROCEDURE — 84100 ASSAY OF PHOSPHORUS: CPT

## 2021-01-01 PROCEDURE — 80180 DRUG SCRN QUAN MYCOPHENOLATE: CPT

## 2021-01-01 PROCEDURE — 93306 TTE W/DOPPLER COMPLETE: CPT

## 2021-01-01 PROCEDURE — 82607 VITAMIN B-12: CPT

## 2021-01-01 PROCEDURE — 770001 HCHG ROOM/CARE - MED/SURG/GYN PRIV*

## 2021-01-01 PROCEDURE — 700111 HCHG RX REV CODE 636 W/ 250 OVERRIDE (IP): Performed by: EMERGENCY MEDICINE

## 2021-01-01 PROCEDURE — 84484 ASSAY OF TROPONIN QUANT: CPT

## 2021-01-01 PROCEDURE — 97161 PT EVAL LOW COMPLEX 20 MIN: CPT

## 2021-01-01 PROCEDURE — 82977 ASSAY OF GGT: CPT

## 2021-01-01 PROCEDURE — 85610 PROTHROMBIN TIME: CPT

## 2021-01-01 PROCEDURE — 0241U HCHG SARS-COV-2 COVID-19 NFCT DS RESP RNA 4 TRGT MIC: CPT

## 2021-01-01 PROCEDURE — 99223 1ST HOSP IP/OBS HIGH 75: CPT | Performed by: STUDENT IN AN ORGANIZED HEALTH CARE EDUCATION/TRAINING PROGRAM

## 2021-01-01 PROCEDURE — A9270 NON-COVERED ITEM OR SERVICE: HCPCS | Performed by: STUDENT IN AN ORGANIZED HEALTH CARE EDUCATION/TRAINING PROGRAM

## 2021-01-01 PROCEDURE — 77063 BREAST TOMOSYNTHESIS BI: CPT

## 2021-01-01 PROCEDURE — 700111 HCHG RX REV CODE 636 W/ 250 OVERRIDE (IP): Performed by: FAMILY MEDICINE

## 2021-01-01 PROCEDURE — 700105 HCHG RX REV CODE 258: Performed by: FAMILY MEDICINE

## 2021-01-01 PROCEDURE — 76642 ULTRASOUND BREAST LIMITED: CPT | Mod: LT

## 2021-01-01 PROCEDURE — 80048 BASIC METABOLIC PNL TOTAL CA: CPT

## 2021-01-01 PROCEDURE — 87040 BLOOD CULTURE FOR BACTERIA: CPT

## 2021-01-01 PROCEDURE — 99233 SBSQ HOSP IP/OBS HIGH 50: CPT | Performed by: FAMILY MEDICINE

## 2021-01-01 PROCEDURE — 700111 HCHG RX REV CODE 636 W/ 250 OVERRIDE (IP): Performed by: STUDENT IN AN ORGANIZED HEALTH CARE EDUCATION/TRAINING PROGRAM

## 2021-01-01 PROCEDURE — 700102 HCHG RX REV CODE 250 W/ 637 OVERRIDE(OP): Performed by: EMERGENCY MEDICINE

## 2021-01-01 PROCEDURE — 700117 HCHG RX CONTRAST REV CODE 255: Performed by: FAMILY MEDICINE

## 2021-01-01 PROCEDURE — 80061 LIPID PANEL: CPT

## 2021-01-01 PROCEDURE — 97163 PT EVAL HIGH COMPLEX 45 MIN: CPT

## 2021-01-01 PROCEDURE — 93005 ELECTROCARDIOGRAM TRACING: CPT | Performed by: EMERGENCY MEDICINE

## 2021-01-01 PROCEDURE — A9270 NON-COVERED ITEM OR SERVICE: HCPCS | Performed by: EMERGENCY MEDICINE

## 2021-01-01 PROCEDURE — 700102 HCHG RX REV CODE 250 W/ 637 OVERRIDE(OP): Performed by: FAMILY MEDICINE

## 2021-01-01 PROCEDURE — 93005 ELECTROCARDIOGRAM TRACING: CPT

## 2021-01-01 PROCEDURE — 83550 IRON BINDING TEST: CPT

## 2021-01-01 PROCEDURE — 87205 SMEAR GRAM STAIN: CPT

## 2021-01-01 PROCEDURE — 99284 EMERGENCY DEPT VISIT MOD MDM: CPT | Mod: 25

## 2021-01-01 PROCEDURE — 83540 ASSAY OF IRON: CPT

## 2021-01-01 PROCEDURE — C9803 HOPD COVID-19 SPEC COLLECT: HCPCS | Performed by: EMERGENCY MEDICINE

## 2021-01-01 PROCEDURE — 84439 ASSAY OF FREE THYROXINE: CPT

## 2021-01-01 PROCEDURE — 82728 ASSAY OF FERRITIN: CPT

## 2021-01-01 PROCEDURE — 80053 COMPREHEN METABOLIC PANEL: CPT | Mod: 91

## 2021-01-01 PROCEDURE — 81003 URINALYSIS AUTO W/O SCOPE: CPT

## 2021-01-01 PROCEDURE — 96375 TX/PRO/DX INJ NEW DRUG ADDON: CPT

## 2021-01-01 PROCEDURE — 84145 PROCALCITONIN (PCT): CPT

## 2021-01-01 PROCEDURE — 83880 ASSAY OF NATRIURETIC PEPTIDE: CPT

## 2021-01-01 PROCEDURE — 84443 ASSAY THYROID STIM HORMONE: CPT

## 2021-01-01 PROCEDURE — 700102 HCHG RX REV CODE 250 W/ 637 OVERRIDE(OP): Performed by: STUDENT IN AN ORGANIZED HEALTH CARE EDUCATION/TRAINING PROGRAM

## 2021-01-01 PROCEDURE — 99285 EMERGENCY DEPT VISIT HI MDM: CPT

## 2021-01-01 PROCEDURE — A9270 NON-COVERED ITEM OR SERVICE: HCPCS | Performed by: FAMILY MEDICINE

## 2021-01-01 PROCEDURE — 70450 CT HEAD/BRAIN W/O DYE: CPT | Mod: ME

## 2021-01-01 PROCEDURE — 99239 HOSP IP/OBS DSCHRG MGMT >30: CPT | Performed by: FAMILY MEDICINE

## 2021-01-01 PROCEDURE — 87641 MR-STAPH DNA AMP PROBE: CPT

## 2021-01-01 RX ORDER — AMBRISENTAN 10 MG/1
10 TABLET, FILM COATED ORAL DAILY
Qty: 30 TABLET | Refills: 11 | Status: SHIPPED | OUTPATIENT
Start: 2021-01-01 | End: 2021-01-01 | Stop reason: CLARIF

## 2021-01-01 RX ORDER — FUROSEMIDE 10 MG/ML
40 INJECTION INTRAMUSCULAR; INTRAVENOUS
Status: DISCONTINUED | OUTPATIENT
Start: 2021-01-01 | End: 2021-01-01

## 2021-01-01 RX ORDER — PREDNISONE 2.5 MG/1
2.5 TABLET ORAL 2 TIMES DAILY
Status: ON HOLD | COMMUNITY
Start: 2021-10-25 | End: 2021-01-01

## 2021-01-01 RX ORDER — POTASSIUM CHLORIDE 20 MEQ/1
20 TABLET, EXTENDED RELEASE ORAL 2 TIMES DAILY
Qty: 60 TABLET | Refills: 11 | Status: ON HOLD | OUTPATIENT
Start: 2021-01-01 | End: 2021-01-01 | Stop reason: SDUPTHER

## 2021-01-01 RX ORDER — TADALAFIL 20 MG/1
20 TABLET ORAL DAILY
Qty: 100 TABLET | Refills: 3 | Status: SHIPPED | OUTPATIENT
Start: 2021-01-01 | End: 2021-01-01 | Stop reason: SDUPTHER

## 2021-01-01 RX ORDER — LABETALOL HYDROCHLORIDE 5 MG/ML
10 INJECTION, SOLUTION INTRAVENOUS EVERY 4 HOURS PRN
Status: DISCONTINUED | OUTPATIENT
Start: 2021-01-01 | End: 2021-01-01 | Stop reason: HOSPADM

## 2021-01-01 RX ORDER — MORPHINE SULFATE 4 MG/ML
4 INJECTION INTRAVENOUS
Status: DISCONTINUED | OUTPATIENT
Start: 2021-01-01 | End: 2021-01-01 | Stop reason: HOSPADM

## 2021-01-01 RX ORDER — TACROLIMUS 1 MG/1
1 CAPSULE ORAL 2 TIMES DAILY
Status: DISCONTINUED | OUTPATIENT
Start: 2021-01-01 | End: 2021-01-01 | Stop reason: HOSPADM

## 2021-01-01 RX ORDER — AMBRISENTAN 10 MG/1
10 TABLET, FILM COATED ORAL DAILY
Qty: 30 TABLET | Refills: 11 | Status: SHIPPED | OUTPATIENT
Start: 2021-01-01

## 2021-01-01 RX ORDER — TADALAFIL 20 MG/1
20 TABLET ORAL DAILY
Status: DISCONTINUED | OUTPATIENT
Start: 2021-01-01 | End: 2021-01-01 | Stop reason: HOSPADM

## 2021-01-01 RX ORDER — AMBRISENTAN 10 MG/1
10 TABLET, FILM COATED ORAL DAILY
Status: DISCONTINUED | OUTPATIENT
Start: 2021-01-01 | End: 2021-01-01 | Stop reason: HOSPADM

## 2021-01-01 RX ORDER — GABAPENTIN 100 MG/1
100 CAPSULE ORAL 2 TIMES DAILY
Qty: 180 CAPSULE | Refills: 1 | Status: SHIPPED | OUTPATIENT
Start: 2021-01-01 | End: 2022-01-01

## 2021-01-01 RX ORDER — GABAPENTIN 100 MG/1
100 CAPSULE ORAL
Qty: 90 CAPSULE | Refills: 1 | Status: SHIPPED | OUTPATIENT
Start: 2021-01-01 | End: 2021-01-01 | Stop reason: SDUPTHER

## 2021-01-01 RX ORDER — FUROSEMIDE 10 MG/ML
40 INJECTION INTRAMUSCULAR; INTRAVENOUS
Status: DISCONTINUED | OUTPATIENT
Start: 2021-01-01 | End: 2021-01-01 | Stop reason: HOSPADM

## 2021-01-01 RX ORDER — AMBRISENTAN 10 MG/1
10 TABLET, FILM COATED ORAL DAILY
Qty: 30 TABLET | Refills: 0 | Status: SHIPPED | OUTPATIENT
Start: 2021-01-01 | End: 2021-01-01 | Stop reason: SDUPTHER

## 2021-01-01 RX ORDER — TADALAFIL 20 MG/1
20 TABLET ORAL DAILY
COMMUNITY
Start: 2021-01-01 | End: 2022-01-01

## 2021-01-01 RX ORDER — FUROSEMIDE 20 MG/1
20 TABLET ORAL DAILY
Qty: 100 TABLET | Refills: 3 | Status: ON HOLD | OUTPATIENT
Start: 2021-01-01 | End: 2021-01-01 | Stop reason: SDUPTHER

## 2021-01-01 RX ORDER — SULFAMETHOXAZOLE AND TRIMETHOPRIM 800; 160 MG/1; MG/1
1 TABLET ORAL EVERY 12 HOURS
Status: DISCONTINUED | OUTPATIENT
Start: 2021-01-01 | End: 2021-01-01

## 2021-01-01 RX ORDER — ASPIRIN 81 MG/1
324 TABLET, CHEWABLE ORAL DAILY
Status: DISCONTINUED | OUTPATIENT
Start: 2021-01-01 | End: 2021-01-01 | Stop reason: HOSPADM

## 2021-01-01 RX ORDER — NALOXEGOL OXALATE 25 MG/1
25 TABLET, FILM COATED ORAL DAILY
Qty: 30 TABLET | OUTPATIENT
Start: 2021-01-01

## 2021-01-01 RX ORDER — ONDANSETRON 4 MG/1
4 TABLET, ORALLY DISINTEGRATING ORAL EVERY 8 HOURS PRN
Status: DISCONTINUED | OUTPATIENT
Start: 2021-01-01 | End: 2021-01-01 | Stop reason: HOSPADM

## 2021-01-01 RX ORDER — FUROSEMIDE 10 MG/ML
40 INJECTION INTRAMUSCULAR; INTRAVENOUS ONCE
Status: COMPLETED | OUTPATIENT
Start: 2021-01-01 | End: 2021-01-01

## 2021-01-01 RX ORDER — LORAZEPAM 1 MG/1
0.5 TABLET ORAL EVERY 4 HOURS PRN
Status: DISCONTINUED | OUTPATIENT
Start: 2021-01-01 | End: 2021-01-01 | Stop reason: HOSPADM

## 2021-01-01 RX ORDER — TACROLIMUS 1 MG/1
2 CAPSULE ORAL 2 TIMES DAILY
COMMUNITY
Start: 2021-01-01 | End: 2022-01-01

## 2021-01-01 RX ORDER — POLYETHYLENE GLYCOL 3350 17 G/17G
1 POWDER, FOR SOLUTION ORAL
Status: DISCONTINUED | OUTPATIENT
Start: 2021-01-01 | End: 2021-01-01 | Stop reason: HOSPADM

## 2021-01-01 RX ORDER — AMBRISENTAN 10 MG/1
10 TABLET, FILM COATED ORAL DAILY
Qty: 30 TABLET | Refills: 11 | Status: SHIPPED | OUTPATIENT
Start: 2021-01-01 | End: 2021-01-01

## 2021-01-01 RX ORDER — LORAZEPAM 2 MG/ML
1 INJECTION INTRAMUSCULAR ONCE
Status: COMPLETED | OUTPATIENT
Start: 2021-01-01 | End: 2021-01-01

## 2021-01-01 RX ORDER — MYCOPHENOLATE MOFETIL 250 MG/1
250 CAPSULE ORAL 2 TIMES DAILY
Status: DISCONTINUED | OUTPATIENT
Start: 2021-01-01 | End: 2021-01-01 | Stop reason: HOSPADM

## 2021-01-01 RX ORDER — BISACODYL 10 MG
10 SUPPOSITORY, RECTAL RECTAL
Status: DISCONTINUED | OUTPATIENT
Start: 2021-01-01 | End: 2021-01-01 | Stop reason: HOSPADM

## 2021-01-01 RX ORDER — PREDNISONE 2.5 MG/1
5 TABLET ORAL DAILY
Status: ON HOLD | COMMUNITY
Start: 2021-01-01 | End: 2021-01-01

## 2021-01-01 RX ORDER — DOXYCYCLINE 100 MG/1
100 TABLET ORAL EVERY 12 HOURS
Status: DISCONTINUED | OUTPATIENT
Start: 2021-01-01 | End: 2021-01-01 | Stop reason: HOSPADM

## 2021-01-01 RX ORDER — POTASSIUM CHLORIDE 20 MEQ/1
20 TABLET, EXTENDED RELEASE ORAL DAILY
Qty: 15 TABLET | Refills: 0 | Status: SHIPPED | OUTPATIENT
Start: 2021-01-01 | End: 2022-01-01

## 2021-01-01 RX ORDER — PRAVASTATIN SODIUM 20 MG
20 TABLET ORAL DAILY
Status: DISCONTINUED | OUTPATIENT
Start: 2021-01-01 | End: 2021-01-01 | Stop reason: HOSPADM

## 2021-01-01 RX ORDER — OMEPRAZOLE 20 MG/1
40 CAPSULE, DELAYED RELEASE ORAL DAILY
Refills: 3 | Status: DISCONTINUED | OUTPATIENT
Start: 2021-01-01 | End: 2021-01-01 | Stop reason: HOSPADM

## 2021-01-01 RX ORDER — LEVOTHYROXINE SODIUM 137 UG/1
137 TABLET ORAL
Status: DISCONTINUED | OUTPATIENT
Start: 2021-01-01 | End: 2021-01-01 | Stop reason: HOSPADM

## 2021-01-01 RX ORDER — CEPHALEXIN 250 MG/1
500 CAPSULE ORAL 4 TIMES DAILY
Qty: 40 CAPSULE | Refills: 0 | Status: SHIPPED | OUTPATIENT
Start: 2021-01-01 | End: 2022-01-01

## 2021-01-01 RX ORDER — TADALAFIL 20 MG/1
20 TABLET ORAL DAILY
Qty: 90 TABLET | Refills: 3 | Status: SHIPPED | OUTPATIENT
Start: 2021-01-01 | End: 2022-01-01 | Stop reason: SDUPTHER

## 2021-01-01 RX ORDER — FUROSEMIDE 20 MG/1
20 TABLET ORAL DAILY
Qty: 30 TABLET | Refills: 0 | Status: ON HOLD | OUTPATIENT
Start: 2021-01-01 | End: 2022-01-01

## 2021-01-01 RX ORDER — TRAZODONE HYDROCHLORIDE 50 MG/1
50 TABLET ORAL
Status: DISCONTINUED | OUTPATIENT
Start: 2021-01-01 | End: 2021-01-01 | Stop reason: HOSPADM

## 2021-01-01 RX ORDER — LACOSAMIDE 50 MG/1
100 TABLET ORAL 2 TIMES DAILY
Status: DISCONTINUED | OUTPATIENT
Start: 2021-01-01 | End: 2021-01-01 | Stop reason: HOSPADM

## 2021-01-01 RX ORDER — LEVOTHYROXINE SODIUM 137 UG/1
137 TABLET ORAL
Qty: 90 TABLET | Refills: 1 | Status: SHIPPED | OUTPATIENT
Start: 2021-01-01 | End: 2022-01-01

## 2021-01-01 RX ORDER — FLUDROCORTISONE ACETATE 0.1 MG/1
0.2 TABLET ORAL DAILY
Qty: 60 TABLET | Refills: 0 | Status: SHIPPED | OUTPATIENT
Start: 2021-01-01 | End: 2022-01-01

## 2021-01-01 RX ORDER — GABAPENTIN 100 MG/1
100 CAPSULE ORAL 2 TIMES DAILY
Status: DISCONTINUED | OUTPATIENT
Start: 2021-01-01 | End: 2021-01-01 | Stop reason: HOSPADM

## 2021-01-01 RX ORDER — LEVETIRACETAM 500 MG/1
750 TABLET ORAL 2 TIMES DAILY
Refills: 2 | Status: DISCONTINUED | OUTPATIENT
Start: 2021-01-01 | End: 2021-01-01 | Stop reason: HOSPADM

## 2021-01-01 RX ORDER — LACOSAMIDE 100 MG/1
100 TABLET ORAL 2 TIMES DAILY
Qty: 60 TABLET | Refills: 2 | Status: SHIPPED | OUTPATIENT
Start: 2021-01-01 | End: 2022-01-01 | Stop reason: SDUPTHER

## 2021-01-01 RX ORDER — FLUDROCORTISONE ACETATE 0.1 MG/1
0.2 TABLET ORAL DAILY
Status: DISCONTINUED | OUTPATIENT
Start: 2021-01-01 | End: 2021-01-01 | Stop reason: HOSPADM

## 2021-01-01 RX ORDER — OXYCODONE HYDROCHLORIDE 5 MG/1
5-10 TABLET ORAL EVERY 6 HOURS PRN
Status: DISCONTINUED | OUTPATIENT
Start: 2021-01-01 | End: 2021-01-01 | Stop reason: HOSPADM

## 2021-01-01 RX ADMIN — GABAPENTIN 100 MG: 100 CAPSULE ORAL at 06:39

## 2021-01-01 RX ADMIN — FLUDROCORTISONE ACETATE 0.2 MG: 0.1 TABLET ORAL at 05:40

## 2021-01-01 RX ADMIN — PRAVASTATIN SODIUM 20 MG: 20 TABLET ORAL at 05:41

## 2021-01-01 RX ADMIN — LACOSAMIDE 100 MG: 50 TABLET, FILM COATED ORAL at 06:39

## 2021-01-01 RX ADMIN — TRAZODONE HYDROCHLORIDE 50 MG: 50 TABLET ORAL at 23:06

## 2021-01-01 RX ADMIN — FUROSEMIDE 40 MG: 10 INJECTION, SOLUTION INTRAMUSCULAR; INTRAVENOUS at 23:16

## 2021-01-01 RX ADMIN — TACROLIMUS 1 MG: 1 CAPSULE ORAL at 05:42

## 2021-01-01 RX ADMIN — MYCOPHENOLATE MOFETIL 250 MG: 250 CAPSULE ORAL at 17:34

## 2021-01-01 RX ADMIN — SERTRALINE HYDROCHLORIDE 100 MG: 50 TABLET ORAL at 05:42

## 2021-01-01 RX ADMIN — TADALAFIL 20 MG: 20 TABLET ORAL at 06:00

## 2021-01-01 RX ADMIN — SULFAMETHOXAZOLE AND TRIMETHOPRIM 1 TABLET: 800; 160 TABLET ORAL at 00:22

## 2021-01-01 RX ADMIN — AMPICILLIN SODIUM AND SULBACTAM SODIUM 3 G: 2; 1 INJECTION, POWDER, FOR SOLUTION INTRAMUSCULAR; INTRAVENOUS at 12:25

## 2021-01-01 RX ADMIN — FUROSEMIDE 40 MG: 10 INJECTION, SOLUTION INTRAMUSCULAR; INTRAVENOUS at 10:54

## 2021-01-01 RX ADMIN — LORAZEPAM 1 MG: 2 INJECTION INTRAMUSCULAR; INTRAVENOUS at 23:15

## 2021-01-01 RX ADMIN — TACROLIMUS 1 MG: 1 CAPSULE ORAL at 17:35

## 2021-01-01 RX ADMIN — AMPICILLIN SODIUM AND SULBACTAM SODIUM 3 G: 2; 1 INJECTION, POWDER, FOR SOLUTION INTRAMUSCULAR; INTRAVENOUS at 23:07

## 2021-01-01 RX ADMIN — TACROLIMUS 1 MG: 1 CAPSULE ORAL at 06:39

## 2021-01-01 RX ADMIN — LEVOTHYROXINE SODIUM 137 MCG: 137 TABLET ORAL at 05:43

## 2021-01-01 RX ADMIN — DOXYCYCLINE 100 MG: 100 TABLET, FILM COATED ORAL at 17:45

## 2021-01-01 RX ADMIN — LEVETIRACETAM 750 MG: 500 TABLET, FILM COATED ORAL at 23:06

## 2021-01-01 RX ADMIN — MYCOPHENOLATE MOFETIL 250 MG: 250 CAPSULE ORAL at 06:40

## 2021-01-01 RX ADMIN — ASPIRIN 81 MG CHEWABLE TABLET 324 MG: 81 TABLET CHEWABLE at 05:39

## 2021-01-01 RX ADMIN — AMBRISENTAN 10 MG: 10 TABLET, FILM COATED ORAL at 06:00

## 2021-01-01 RX ADMIN — OMEPRAZOLE 40 MG: 20 CAPSULE, DELAYED RELEASE ORAL at 05:41

## 2021-01-01 RX ADMIN — LACOSAMIDE 100 MG: 50 TABLET, FILM COATED ORAL at 17:35

## 2021-01-01 RX ADMIN — MORPHINE SULFATE 4 MG: 4 INJECTION INTRAVENOUS at 21:44

## 2021-01-01 RX ADMIN — DOXYCYCLINE 100 MG: 100 TABLET, FILM COATED ORAL at 05:43

## 2021-01-01 RX ADMIN — LACOSAMIDE 100 MG: 50 TABLET, FILM COATED ORAL at 05:41

## 2021-01-01 RX ADMIN — AMPICILLIN SODIUM AND SULBACTAM SODIUM 3 G: 2; 1 INJECTION, POWDER, FOR SOLUTION INTRAMUSCULAR; INTRAVENOUS at 05:38

## 2021-01-01 RX ADMIN — LEVETIRACETAM 750 MG: 500 TABLET, FILM COATED ORAL at 11:56

## 2021-01-01 RX ADMIN — OXYCODONE HYDROCHLORIDE 10 MG: 5 TABLET ORAL at 23:05

## 2021-01-01 RX ADMIN — OXYCODONE HYDROCHLORIDE 10 MG: 5 TABLET ORAL at 07:55

## 2021-01-01 RX ADMIN — PRAVASTATIN SODIUM 20 MG: 20 TABLET ORAL at 06:39

## 2021-01-01 RX ADMIN — LEVOTHYROXINE SODIUM 137 MCG: 137 TABLET ORAL at 06:39

## 2021-01-01 RX ADMIN — OXYCODONE HYDROCHLORIDE 10 MG: 5 TABLET ORAL at 05:40

## 2021-01-01 RX ADMIN — DOXYCYCLINE 100 MG: 100 TABLET, FILM COATED ORAL at 11:16

## 2021-01-01 RX ADMIN — SERTRALINE HYDROCHLORIDE 100 MG: 50 TABLET ORAL at 06:39

## 2021-01-01 RX ADMIN — TADALAFIL 20 MG: 20 TABLET ORAL at 12:30

## 2021-01-01 RX ADMIN — LEVETIRACETAM 750 MG: 500 TABLET, FILM COATED ORAL at 10:54

## 2021-01-01 RX ADMIN — LORAZEPAM 0.5 MG: 1 TABLET ORAL at 17:35

## 2021-01-01 RX ADMIN — IOHEXOL 50 ML: 350 INJECTION, SOLUTION INTRAVENOUS at 16:54

## 2021-01-01 RX ADMIN — FUROSEMIDE 40 MG: 10 INJECTION, SOLUTION INTRAMUSCULAR; INTRAVENOUS at 05:44

## 2021-01-01 RX ADMIN — ENOXAPARIN SODIUM 40 MG: 40 INJECTION SUBCUTANEOUS at 05:39

## 2021-01-01 RX ADMIN — LORAZEPAM 0.5 MG: 1 TABLET ORAL at 08:52

## 2021-01-01 RX ADMIN — GABAPENTIN 100 MG: 100 CAPSULE ORAL at 05:42

## 2021-01-01 RX ADMIN — OXYCODONE HYDROCHLORIDE 10 MG: 5 TABLET ORAL at 16:10

## 2021-01-01 RX ADMIN — LORAZEPAM 0.5 MG: 1 TABLET ORAL at 11:16

## 2021-01-01 RX ADMIN — AMPICILLIN SODIUM AND SULBACTAM SODIUM 3 G: 2; 1 INJECTION, POWDER, FOR SOLUTION INTRAMUSCULAR; INTRAVENOUS at 17:35

## 2021-01-01 RX ADMIN — LORAZEPAM 0.5 MG: 1 TABLET ORAL at 01:01

## 2021-01-01 RX ADMIN — OMEPRAZOLE 40 MG: 20 CAPSULE, DELAYED RELEASE ORAL at 06:39

## 2021-01-01 RX ADMIN — AMPICILLIN SODIUM AND SULBACTAM SODIUM 3 G: 2; 1 INJECTION, POWDER, FOR SOLUTION INTRAMUSCULAR; INTRAVENOUS at 11:56

## 2021-01-01 RX ADMIN — MORPHINE SULFATE 4 MG: 4 INJECTION INTRAVENOUS at 01:01

## 2021-01-01 RX ADMIN — MYCOPHENOLATE MOFETIL 250 MG: 250 CAPSULE ORAL at 05:42

## 2021-01-01 RX ADMIN — MORPHINE SULFATE 4 MG: 4 INJECTION INTRAVENOUS at 04:33

## 2021-01-01 RX ADMIN — FLUDROCORTISONE ACETATE 0.2 MG: 0.1 TABLET ORAL at 06:39

## 2021-01-01 RX ADMIN — GABAPENTIN 100 MG: 100 CAPSULE ORAL at 17:34

## 2021-01-01 ASSESSMENT — LIFESTYLE VARIABLES
TOTAL SCORE: 0
CONSUMPTION TOTAL: NEGATIVE
HOW MANY TIMES IN THE PAST YEAR HAVE YOU HAD 5 OR MORE DRINKS IN A DAY: 0
HAVE YOU EVER FELT YOU SHOULD CUT DOWN ON YOUR DRINKING: NO
TOTAL SCORE: 0
ON A TYPICAL DAY WHEN YOU DRINK ALCOHOL HOW MANY DRINKS DO YOU HAVE: 0
TOTAL SCORE: 0
EVER HAD A DRINK FIRST THING IN THE MORNING TO STEADY YOUR NERVES TO GET RID OF A HANGOVER: NO
AVERAGE NUMBER OF DAYS PER WEEK YOU HAVE A DRINK CONTAINING ALCOHOL: 0
HAVE PEOPLE ANNOYED YOU BY CRITICIZING YOUR DRINKING: NO
EVER FELT BAD OR GUILTY ABOUT YOUR DRINKING: NO
ALCOHOL_USE: NO

## 2021-01-01 ASSESSMENT — COGNITIVE AND FUNCTIONAL STATUS - GENERAL
TOILETING: A LITTLE
PERSONAL GROOMING: A LITTLE
HELP NEEDED FOR BATHING: A LITTLE
MOBILITY SCORE: 18
CLIMB 3 TO 5 STEPS WITH RAILING: A LITTLE
EATING MEALS: A LITTLE
MOBILITY SCORE: 23
MOVING TO AND FROM BED TO CHAIR: A LITTLE
SUGGESTED CMS G CODE MODIFIER MOBILITY: CI
SUGGESTED CMS G CODE MODIFIER DAILY ACTIVITY: CK
SUGGESTED CMS G CODE MODIFIER MOBILITY: CK
WALKING IN HOSPITAL ROOM: A LITTLE
MOVING FROM LYING ON BACK TO SITTING ON SIDE OF FLAT BED: A LITTLE
DRESSING REGULAR UPPER BODY CLOTHING: A LITTLE
TURNING FROM BACK TO SIDE WHILE IN FLAT BAD: A LITTLE
DRESSING REGULAR LOWER BODY CLOTHING: A LITTLE
STANDING UP FROM CHAIR USING ARMS: A LITTLE
CLIMB 3 TO 5 STEPS WITH RAILING: A LITTLE
DAILY ACTIVITIY SCORE: 18

## 2021-01-01 ASSESSMENT — FIBROSIS 4 INDEX
FIB4 SCORE: 4.58
FIB4 SCORE: 3.77
FIB4 SCORE: 3.77
FIB4 SCORE: 5.36
FIB4 SCORE: 3.71

## 2021-01-01 ASSESSMENT — PAIN DESCRIPTION - DESCRIPTORS: DESCRIPTORS: ACHING

## 2021-01-01 ASSESSMENT — ENCOUNTER SYMPTOMS
FEVER: 0
SHORTNESS OF BREATH: 1
PAIN SCALE: 9
ABDOMINAL PAIN: 0
DIZZINESS: 0
COUGH: 1
NERVOUS/ANXIOUS: 1
VOMITING: 0
CHILLS: 1
DIARRHEA: 0
FEVER: 1
WHEEZING: 0
SORE THROAT: 0
NAUSEA: 0
MYALGIAS: 0
HEADACHES: 0
SPUTUM PRODUCTION: 0

## 2021-01-01 ASSESSMENT — GAIT ASSESSMENTS
GAIT LEVEL OF ASSIST: SUPERVISED
DEVIATION: OTHER (COMMENT)
DISTANCE (FEET): 150

## 2021-01-01 ASSESSMENT — PATIENT HEALTH QUESTIONNAIRE - PHQ9
SUM OF ALL RESPONSES TO PHQ9 QUESTIONS 1 AND 2: 0
1. LITTLE INTEREST OR PLEASURE IN DOING THINGS: NOT AT ALL
2. FEELING DOWN, DEPRESSED, IRRITABLE, OR HOPELESS: NOT AT ALL
1. LITTLE INTEREST OR PLEASURE IN DOING THINGS: NOT AT ALL
2. FEELING DOWN, DEPRESSED, IRRITABLE, OR HOPELESS: NOT AT ALL
SUM OF ALL RESPONSES TO PHQ9 QUESTIONS 1 AND 2: 0

## 2021-01-01 ASSESSMENT — PAIN DESCRIPTION - PAIN TYPE
TYPE: ACUTE PAIN
TYPE: ACUTE PAIN;CHRONIC PAIN
TYPE: ACUTE PAIN

## 2021-01-01 NOTE — PROGRESS NOTES
Infant with multiple desaturations into the low 70's in a 30 min time period. Infant sleeping quietly in bed. [facility] and order for NC 1L obtained. Infant placed on NC per RN and infant began saturating in the low 90's. Parents notified per RN. Subjective:     Chief Complaint   Patient presents with   • Medication Refill       Roxana Cuba is a 57 y.o. female here today for medication refills.    Patient has a history of chronic pain due to post surgical and chronic thoracic pain. Patient is currently taking oxycodone 10 mg 3 times a day. Patient reports that with out pain medication her pain as 10-10 located at the right rib side where she previously had a lung biopsy, in the mid thoracic back, and now in the low back. Patient states that low back pain has been worsening since recent lumbar puncture. Patient denies any headaches. Patient denies any bleeding from site. Patient reports that her pain is worse with bending and lifting. Heat and breast improve her generalized pain.     Opioid Risk Tool   Hector each box that applies  Female  Male    Family history of substance abuse    Alcohol  1  3    Illegal drugs  2  3    Rx drugs  4  4    Personal history of substance abuse    Alcohol  3  3    Illegal drugs  4  4    Rx drugs  5  5    Age between 16 -- 35 years  1  1    History of preadolescent sexual abuse  3  0    Psychological disease    ADD, OCD, bipolar, schizophrenia  2  2    Depression  1  1    Total Score 1      Low risk 0-3: Recommend annual UDS    Consequences of Chronic Opiate therapy:  (5 A's)  Analgesia: Improved with medication  Activity: Restricted ADLs , improved with use of medication  Adverse Events:   constipation  Aberrant Behaviors:  None  and patient reports consistent  Affect/Mood: Stable mood, appropriate affect  Signs of oversedation. No signs of withdrawal.       Patient has a history of anxiety. She is currently taking Xanax as needed up to 3 times a day. Patient states that prednisone makes her anxiety worse. Patient needs to be on prednisone due to her history of sarcoidosis.    Last dose Oxycodone this AM  Last Xanax this AM  Dilaudid and Morphine in the hospital.         Allergies   Allergen Reactions   • Rhubarb  Anaphylaxis   • Vancomycin Hcl      Causes red man syndrome when infused to fast       Current medicines (including changes today)  Current Outpatient Prescriptions   Medication Sig Dispense Refill   • ferrous sulfate (FEOSOL) 220 (44 FE) MG/5ML Elixir Take 5 mL by mouth every day. 1 Bottle 10   • oxycodone immediate release (ROXICODONE) 10 MG immediate release tablet Take 1-3 Tabs by mouth every 6 hours as needed for Moderate Pain. 90 Tab 0   • alprazolam (XANAX) 0.5 MG Tab Take 1 Tab by mouth 3 times a day. 90 Tab 2   • Linaclotide 290 MCG Cap Take 290 mg by mouth every day. 90 Cap 3   • predniSONE (DELTASONE) 1 MG Tab      • CITRACAL MAXIMUM 315-250 MG-UNIT Tab TAKE 2 TABS BY MOUTH 2X/ DAY WITH FOOD BEFORE AND AFTER DINNER FOR LIFE-CRUSH 1ST 3 MONTH, SPACE  Tab 11   • tacrolimus (PROGRAF) 0.5 MG Cap Take 0.5-2 mg by mouth 2 times a day. 2 mg at 0800  1.5 mg at 2000     • ascorbic acid (ASCORBIC ACID) 500 MG Tab Take 500 mg by mouth every day.     • Polyethyl Glycol-Propyl Glycol (SYSTANE OP) 1 Drop by Ophthalmic route 3 times a day.     • sennosides (SENOKOT) 8.6 MG Tab Take 17.2 mg by mouth 2 times a day.     • Probiotic Product (PROBIOTIC DAILY PO) Take 1 Cap by mouth every day.     • polyethylene glycol/lytes (MIRALAX) Pack Take 17 g by mouth every day.     • aspirin EC (ECOTRIN) 81 MG Tablet Delayed Response Take 1 Tab by mouth every morning. 90 Tab 4   • gabapentin (NEURONTIN) 600 MG tablet Take 1 Tab by mouth 3 times a day. 270 Tab 4   • levothyroxine (SYNTHROID) 137 MCG Tab Take 1 Tab by mouth every day. 90 Tab 4   • omeprazole (PRILOSEC) 40 MG delayed-release capsule Take 1 Cap by mouth every day. 90 Cap 4   • ondansetron (ZOFRAN ODT) 4 MG TABLET DISPERSIBLE Take 1 Tab by mouth every 8 hours as needed for Nausea/Vomiting. Nausea and vomiting 270 Tab 4   • tiotropium (SPIRIVA) 18 MCG Cap Inhale 1 Cap by mouth every day. 90 Cap 4   • fluticasone (FLONASE) 50 MCG/ACT nasal spray Spray 1 Spray in nose  every day. 16 g 10   • predniSONE (DELTASONE) 5 MG Tab Take 7 mg by mouth every morning.     • sertraline (ZOLOFT) 100 MG Tab Take 100 mg by mouth every bedtime.     • ambrisentan (LETAIRIS) 10 MG tablet Take 1 Tab by mouth every day. 30 Tab 11   • docusate sodium (COLACE) 100 MG Cap Take 100 mg by mouth 2 times a day.     • mycophenolate (CELLCEPT) 250 MG Cap Take 500 mg by mouth 2 times a day.     • furosemide (LASIX) 40 MG Tab Take 40 mg by mouth every morning.     • Tadalafil, PAH, (ADCIRCA) 20 MG Tab Take 40 mg by mouth every bedtime. Indications: Pulmonary Arterial Hypertension     • pravastatin (PRAVACHOL) 20 MG Tab Take 1 Tab by mouth every day. 30 Tab 11   • Linaclotide 145 MCG Cap Take 1 Tab by mouth 1 time daily as needed. 30 Cap 11   • cyclobenzaprine (FLEXERIL) 10 MG Tab Take 1 Tab by mouth 2 times a day as needed. (Patient taking differently: Take 10 mg by mouth 3 times a day as needed.) 90 Tab 4   • trazodone (DESYREL) 50 MG Tab Take 1-2 Tabs by mouth at bedtime as needed for Sleep. 90 Tab 4     No current facility-administered medications for this visit.     Social History   Substance Use Topics   • Smoking status: Passive Smoke Exposure - Never Smoker   • Smokeless tobacco: Never Used      Comment: avoid all tobacco products   • Alcohol Use: No      Comment: 2009 quit drinking     Family Status   Relation Status Death Age   • Father     • Mother     • Sister Alive      Family History   Problem Relation Age of Onset   • Other Father      Unknown (dead 10 years)   • Diabetes Father    • Heart Disease Father    • Hypertension Father    • Hyperlipidemia Father    • Stroke Father    • Non-contributory Mother    • Hyperlipidemia Mother    • Alcohol/Drug Mother    • Cancer Paternal Aunt    • Alcohol/Drug Maternal Grandmother    • Alcohol/Drug Maternal Grandfather      She    has a past medical history of Cirrhosis (CMS-HCC) (2011); S/P cholecystectomy; GERD (gastroesophageal  "reflux disease); Psychiatric disorder; Fracture of left foot; Bronchitis ( ); CKD (chronic kidney disease) stage 3, GFR 30-59 ml/min; Breath shortness; Chronic fatigue and malaise; VRE (vancomycin-resistant Enterococci); Low back pain (02-17-17); Pneumonia; Mild aortic regurgitation and aortic valve sclerosis ( ); Splenomegaly; Small bowel obstruction (CMS-HCC); On home oxygen therapy; Pulmonary hypertension (CMS-HCC); Diabetes (CMS-HCC); Hypothyroid; H/O Clostridium difficile infection (02-17-17); and Platelet disorder (CMS-HCC).        ROS   GEN: no  fevers, or chills  HEENT: no blurry vision or change in vision  Resp: + chronic  shortness of breath, no cough  CV: no racing heart, no irregular beats, no chest pain  Abd: + general abd pain, no nausea, no vomiting, no diarrhea, + constipation, no blood in stool, no dark stools, no incontinence  : no dysuria, no hematuria, no urinary incontinence, no increased frequency  MSK: LBP, thoracic pain, R rib pain  Neuro: no headaches, no dizziness, no LOC       Objective:     Blood pressure 120/64, pulse 83, temperature 36.4 °C (97.5 °F), resp. rate 16, height 1.727 m (5' 7.99\"), weight 78.926 kg (174 lb), last menstrual period 01/03/2000, SpO2 91 %. Body mass index is 26.46 kg/(m^2).   Physical Exam:  Constitutional: Alert, no distress.  Skin: Warm, dry, good turgor, no rashes in visible areas.  Eye: Equal, round and reactive, conjunctiva clear, lids normal.  ENMT: Lips without lesions, good dentition, oropharynx non-erythematous, no exudate, moist oral mucosa  Neck: Trachea midline, no masses, no thyromegaly. No cervical or supraclavicular lymphadenopathy. Full ROM  Respiratory: Unlabored respiratory effort, good air movement, lungs clear to auscultation, no wheezes, no ronchi, nasal cannula in place.  Cardiovascular:RRR, +S1, S2, no murmur, no peripheral edema, pedal and radial pulses equal and intact bilat  Abdomen: Distended, Soft, non-tender, no masses, no " hepatosplenomegaly.  MSK:5/5 muscle strength in upper extremities as well as lower extremity bilaterally, tenderness to palpation T8-10 with surrounding, hypertonic paraspinal muscles, healing needle martha between L3 and L4, no tenderness to palpation, no warmth, no discharge, no hematoma, hypertension and quadratus lumborum  Psych: Alert and oriented x3, appropriate affect and mood.  Neuro: CN2-12 intact, no gross motor or sensory deficits      Assessment and Plan:   The following treatment plan was discussed    1. Chronic use of opiate drugs therapeutic purposes  - I stressed the importance of  non-narcotic treatments to control pain including  NSAIDs, PT, surgery, gabapentin, Cymbalta, topical analgesics,home exercise plan, and pain management for injections.    -  I reviewed medications. The medications have  been  helpful for controlling the pain, maintaining mood, and allowing the patient to function, including ADLs.     -Current MED:45   - Side effects are controlled. No constipation    - No aberrant behavior noted.  -  I reviewed diagnostic studies. I reviewed lab studies.  I reviewed medical issues. I reviewed social issues.  - I have obtained and reviewed patient utilization report from [facility]. The pt has a meaningful improvement in function and pain without significant risk of harm. Based on my assessment through pt's  Report of pain, physical exam, diagnostic imaging, and review of records including , the controlled medicine prescriptions written today are medically necessary.  -The patient was advised to avoid alcohol use with prescribed medication. I counseled the patient regarding risks, side effects, and interactions of medications. Pt is advised to take no drugs and  take only medications reported to this office and prescribed.  I counseled the patient on the controlled substance prescribing program. I reviewed further symptomatic medications. I advised the pt of risk of use of  NSAIDs for PUD and renal concerns.   -No acetaminophen due to liver transplant    Date of Last therapy agreement/information sheet: today  Opiate Risk score: 1  Frequency of UDS: annual  Date of last UDS: today   verified: today  Discrepancies: none    - oxycodone immediate release (ROXICODONE) 10 MG immediate release tablet; Take 1-3 Tabs by mouth every 6 hours as needed for Moderate Pain.  Dispense: 90 Tab; Refill: 0  - oxycodone immediate release (ROXICODONE) 10 MG immediate release tablet; Take 1-3 Tabs by mouth every 6 hours as needed for Moderate Pain.  Dispense: 90 Tab; Refill: 0  - oxycodone immediate release (ROXICODONE) 10 MG immediate release tablet; Take 1-3 Tabs by mouth every 6 hours as needed for Moderate Pain.  Dispense: 90 Tab; Refill: 0  - CONTROLLED SUBSTANCE TREATMENT AGREEMENT  - [facility] PAIN MANAGEMENT SCREEN; Future    2. Chronic bilateral thoracic back pain  As per #1  - oxycodone immediate release (ROXICODONE) 10 MG immediate release tablet; Take 1-3 Tabs by mouth every 6 hours as needed for Moderate Pain.  Dispense: 90 Tab; Refill: 0  - oxycodone immediate release (ROXICODONE) 10 MG immediate release tablet; Take 1-3 Tabs by mouth every 6 hours as needed for Moderate Pain.  Dispense: 90 Tab; Refill: 0  - oxycodone immediate release (ROXICODONE) 10 MG immediate release tablet; Take 1-3 Tabs by mouth every 6 hours as needed for Moderate Pain.  Dispense: 90 Tab; Refill: 0      3. Chronic pain syndrome  As per#1  - oxycodone immediate release (ROXICODONE) 10 MG immediate release tablet; Take 1-3 Tabs by mouth every 6 hours as needed for Moderate Pain.  Dispense: 90 Tab; Refill: 0  - oxycodone immediate release (ROXICODONE) 10 MG immediate release tablet; Take 1-3 Tabs by mouth every 6 hours as needed for Moderate Pain.  Dispense: 90 Tab; Refill: 0  - oxycodone immediate release (ROXICODONE) 10 MG immediate release tablet; Take 1-3 Tabs by mouth every 6 hours as needed for Moderate Pain.   Dispense: 90 Tab; Refill: 0      4. Chronic prescription benzodiazepine use  Patient understands they  cannot take any other medications including prescription, over-the-counter, or illicit substances including alcohol while being treated with a benzodiazepine from our office. Patient understands the risk of benzodiazepine including respiratory depression and sedation. Patient does not take medication prior to driving. Patient only takes medications when they are at home. Patient understands that combined use of opiates with benzodiazepines has a 4 times risk of overdose.  - alprazolam (XANAX) 0.5 MG Tab; Take 1 Tab by mouth 3 times a day.  Dispense: 90 Tab; Refill: 2  - CONTROLLED SUBSTANCE TREATMENT AGREEMENT  - [facility] PAIN MANAGEMENT SCREEN; Future    5.Anxiety    Currently controlled with Xanax as needed.    6. Iron deficiency  Chronic: Status post gastric bypass. Patient states that she has difficulty swallowing tablets therefore recommend liquid.  - ferrous sulfate (FEOSOL) 220 (44 FE) MG/5ML Elixir; Take 5 mL by mouth every day.  Dispense: 1 Bottle; Refill: 10    7. Drug-induced constipation  Due to opiates and iron. Patient is Currently taking Linzess with mild improvement. Patient is taking 2 tablets with significant improvement. Therefore recommend patient increase dose.  - Linaclotide 290 MCG Cap; Take 290 mg by mouth every day.  Dispense: 90 Cap; Refill: 3    8. Need for vaccination  I discussed benefits and side effects of each vaccine with patient, and I answered all patient's questions about vaccines.  - TDAP VACCINE =>8YO IM      Followup: Return in about 3 months (around 7/12/2017) for pain med refill.    Please note that this dictation was created using voice recognition software. I have made every reasonable attempt to correct obvious errors, but I expect that there are errors of grammar and possibly content that I did not discover before finalizing the note.

## 2021-01-06 NOTE — ASSESSMENT & PLAN NOTE
Dr. Thurston Leader is out of the office today. One refill done. Stable. Currently taking sertraline 100 mg  as prescribed.   Patient is going to [facility] for 2 months in early June and she is quite excited about this.  Denies side effects and is tolerating well.  Mood is improved with current medication and therapy.    Patient denies SI/HI.  Depression Screen (PHQ-2/PHQ-9) 11/23/2018 11/28/2018 3/5/2019   PHQ-2 Total Score 0 0 0   PHQ-2 Total Score - - -   PHQ-2 Total Score - - -   PHQ-9 Total Score - - -

## 2021-03-15 DIAGNOSIS — Z23 NEED FOR VACCINATION: ICD-10-CM

## 2021-04-09 NOTE — ED NOTES
IV fluids hung per MAR.   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

## 2021-05-03 ENCOUNTER — PATIENT MESSAGE (OUTPATIENT)
Dept: CARDIOLOGY | Facility: MEDICAL CENTER | Age: 61
End: 2021-05-03

## 2021-05-03 DIAGNOSIS — Z94.4 STATUS POST LIVER TRANSPLANT (HCC): ICD-10-CM

## 2021-05-03 DIAGNOSIS — Z79.899 HIGH RISK MEDICATION USE: ICD-10-CM

## 2021-05-03 DIAGNOSIS — K76.81 HEPATOPULMONARY SYNDROME (HCC): ICD-10-CM

## 2021-05-03 DIAGNOSIS — D69.6 THROMBOCYTOPENIA (HCC): ICD-10-CM

## 2021-05-03 DIAGNOSIS — J84.9 INTERSTITIAL LUNG DISEASE (HCC): ICD-10-CM

## 2021-05-03 DIAGNOSIS — E78.01 FAMILIAL HYPERCHOLESTEROLEMIA: ICD-10-CM

## 2021-05-03 DIAGNOSIS — R16.1 SPLENOMEGALY: ICD-10-CM

## 2021-05-03 DIAGNOSIS — I34.0 MILD MITRAL REGURGITATION: ICD-10-CM

## 2021-05-03 DIAGNOSIS — N18.30 CKD (CHRONIC KIDNEY DISEASE) STAGE 3, GFR 30-59 ML/MIN: ICD-10-CM

## 2021-05-03 NOTE — PATIENT COMMUNICATION
Made appt for 7/28/21.    90 day supply provided of cardiac medication to get patient to that date.     Advised to reach out to Pulmonary Dr. Gaming for O2 order

## 2021-05-25 ENCOUNTER — PATIENT MESSAGE (OUTPATIENT)
Dept: HEALTH INFORMATION MANAGEMENT | Facility: OTHER | Age: 61
End: 2021-05-25

## 2021-07-19 ENCOUNTER — TELEPHONE (OUTPATIENT)
Dept: MEDICAL GROUP | Facility: LAB | Age: 61
End: 2021-07-19

## 2021-07-19 NOTE — TELEPHONE ENCOUNTER
ESTABLISHED PATIENT PRE-VISIT PLANNING     Patient was NOT contacted to complete PVP.     Note: Patient will not be contacted if there is no indication to call.     1.  Reviewed notes from the last few office visits within the medical group: Yes    2.  If any orders were placed at last visit or intended to be done for this visit (i.e. 6 mos follow-up), do we have Results/Consult Notes?         •  Labs - Labs were not ordered at last office visit.  Note: If patient appointment is for lab review and patient did not complete labs, check with provider if OK to reschedule patient until labs completed.       •  Imaging - Imaging was not ordered at last office visit.       •  Referrals - No referrals were ordered at last office visit.    3. Is this appointment scheduled as a Hospital Follow-Up? No    4.  Immunizations were updated in Epic using Reconcile Outside Information activity? Yes    5.  Patient is due for the following Health Maintenance Topics:   Health Maintenance Due   Topic Date Due   • Annual Wellness Visit  Never done   • COVID-19 Vaccine (1) Never done   • Annual Pulmonary Function Test / Spirometry  08/11/2017   • IMM ZOSTER VACCINES (2 of 2) 01/29/2019   • MAMMOGRAM  11/19/2020       6.  AHA (Pulse8) form printed for Provider? Yes

## 2021-08-03 ENCOUNTER — APPOINTMENT (OUTPATIENT)
Dept: RADIOLOGY | Facility: MEDICAL CENTER | Age: 61
DRG: 026 | End: 2021-08-03
Attending: EMERGENCY MEDICINE
Payer: MEDICARE

## 2021-08-03 ENCOUNTER — APPOINTMENT (OUTPATIENT)
Dept: RADIOLOGY | Facility: MEDICAL CENTER | Age: 61
DRG: 026 | End: 2021-08-03
Attending: INTERNAL MEDICINE
Payer: MEDICARE

## 2021-08-03 ENCOUNTER — HOSPITAL ENCOUNTER (INPATIENT)
Facility: MEDICAL CENTER | Age: 61
LOS: 14 days | DRG: 026 | End: 2021-08-17
Attending: EMERGENCY MEDICINE | Admitting: INTERNAL MEDICINE
Payer: MEDICARE

## 2021-08-03 ENCOUNTER — TELEPHONE (OUTPATIENT)
Dept: SLEEP MEDICINE | Facility: MEDICAL CENTER | Age: 61
End: 2021-08-03

## 2021-08-03 DIAGNOSIS — R56.9 SEIZURES (HCC): ICD-10-CM

## 2021-08-03 DIAGNOSIS — S06.5X0A TRAUMATIC SUBDURAL HEMATOMA WITHOUT LOSS OF CONSCIOUSNESS, INITIAL ENCOUNTER (HCC): Primary | ICD-10-CM

## 2021-08-03 DIAGNOSIS — S06.5X0D TRAUMATIC SUBDURAL HEMATOMA WITHOUT LOSS OF CONSCIOUSNESS, SUBSEQUENT ENCOUNTER: ICD-10-CM

## 2021-08-03 DIAGNOSIS — Z79.899 CHRONIC PRESCRIPTION BENZODIAZEPINE USE: ICD-10-CM

## 2021-08-03 PROBLEM — Z87.01 HISTORY OF PNEUMONIA: Status: ACTIVE | Noted: 2021-08-03

## 2021-08-03 PROBLEM — E87.0 HYPERNATREMIA: Status: ACTIVE | Noted: 2021-08-03

## 2021-08-03 LAB
ALBUMIN SERPL BCP-MCNC: 3.3 G/DL (ref 3.2–4.9)
ALBUMIN/GLOB SERPL: 1.3 G/DL
ALP SERPL-CCNC: 84 U/L (ref 30–99)
ALT SERPL-CCNC: 19 U/L (ref 2–50)
ANION GAP SERPL CALC-SCNC: 14 MMOL/L (ref 7–16)
AST SERPL-CCNC: 40 U/L (ref 12–45)
BASOPHILS # BLD AUTO: 0.9 % (ref 0–1.8)
BASOPHILS # BLD: 0.03 K/UL (ref 0–0.12)
BILIRUB SERPL-MCNC: 0.3 MG/DL (ref 0.1–1.5)
BUN SERPL-MCNC: 10 MG/DL (ref 8–22)
C DIFF DNA SPEC QL NAA+PROBE: NEGATIVE
C DIFF TOX GENS STL QL NAA+PROBE: NEGATIVE
CALCIUM SERPL-MCNC: 8.5 MG/DL (ref 8.5–10.5)
CFT BLD TEG: 4.2 MIN (ref 4.6–9.1)
CFT P HPASE BLD TEG: 4.3 MIN (ref 4.3–8.3)
CHLORIDE SERPL-SCNC: 109 MMOL/L (ref 96–112)
CLOT ANGLE BLD TEG: 73.7 DEGREES (ref 63–78)
CO2 SERPL-SCNC: 25 MMOL/L (ref 20–33)
CREAT SERPL-MCNC: 0.51 MG/DL (ref 0.5–1.4)
CT.EXTRINSIC BLD ROTEM: 1.3 MIN (ref 0.8–2.1)
EKG IMPRESSION: NORMAL
EOSINOPHIL # BLD AUTO: 0.16 K/UL (ref 0–0.51)
EOSINOPHIL NFR BLD: 4.8 % (ref 0–6.9)
ERYTHROCYTE [DISTWIDTH] IN BLOOD BY AUTOMATED COUNT: 60.7 FL (ref 35.9–50)
GLOBULIN SER CALC-MCNC: 2.5 G/DL (ref 1.9–3.5)
GLUCOSE SERPL-MCNC: 82 MG/DL (ref 65–99)
HCT VFR BLD AUTO: 38.7 % (ref 37–47)
HGB BLD-MCNC: 12.5 G/DL (ref 12–16)
IMM GRANULOCYTES # BLD AUTO: 0.03 K/UL (ref 0–0.11)
IMM GRANULOCYTES NFR BLD AUTO: 0.9 % (ref 0–0.9)
INR PPP: 1.13 (ref 0.87–1.13)
LACTATE BLD-SCNC: 1.3 MMOL/L (ref 0.5–2)
LYMPHOCYTES # BLD AUTO: 1.16 K/UL (ref 1–4.8)
LYMPHOCYTES NFR BLD: 34.5 % (ref 22–41)
MCF BLD TEG: 54 MM (ref 52–69)
MCF.PLATELET INHIB BLD ROTEM: 15.4 MM (ref 15–32)
MCH RBC QN AUTO: 30.4 PG (ref 27–33)
MCHC RBC AUTO-ENTMCNC: 32.3 G/DL (ref 33.6–35)
MCV RBC AUTO: 94.2 FL (ref 81.4–97.8)
MONOCYTES # BLD AUTO: 0.29 K/UL (ref 0–0.85)
MONOCYTES NFR BLD AUTO: 8.6 % (ref 0–13.4)
NEUTROPHILS # BLD AUTO: 1.69 K/UL (ref 2–7.15)
NEUTROPHILS NFR BLD: 50.3 % (ref 44–72)
NRBC # BLD AUTO: 0 K/UL
NRBC BLD-RTO: 0 /100 WBC
NT-PROBNP SERPL IA-MCNC: 857 PG/ML (ref 0–125)
PA AA BLD-ACNC: 41.3 % (ref 0–11)
PA ADP BLD-ACNC: 14.4 % (ref 0–17)
PLATELET # BLD AUTO: 174 K/UL (ref 164–446)
PMV BLD AUTO: 9.2 FL (ref 9–12.9)
POTASSIUM SERPL-SCNC: 2.8 MMOL/L (ref 3.6–5.5)
PROT SERPL-MCNC: 5.8 G/DL (ref 6–8.2)
PROTHROMBIN TIME: 14.2 SEC (ref 12–14.6)
RBC # BLD AUTO: 4.11 M/UL (ref 4.2–5.4)
SODIUM SERPL-SCNC: 148 MMOL/L (ref 135–145)
TEG ALGORITHM TGALG: ABNORMAL
TROPONIN T SERPL-MCNC: 23 NG/L (ref 6–19)
WBC # BLD AUTO: 3.4 K/UL (ref 4.8–10.8)

## 2021-08-03 PROCEDURE — 770022 HCHG ROOM/CARE - ICU (200)

## 2021-08-03 PROCEDURE — 74022 RADEX COMPL AQT ABD SERIES: CPT

## 2021-08-03 PROCEDURE — 83880 ASSAY OF NATRIURETIC PEPTIDE: CPT

## 2021-08-03 PROCEDURE — 96365 THER/PROPH/DIAG IV INF INIT: CPT

## 2021-08-03 PROCEDURE — 85384 FIBRINOGEN ACTIVITY: CPT

## 2021-08-03 PROCEDURE — 85025 COMPLETE CBC W/AUTO DIFF WBC: CPT

## 2021-08-03 PROCEDURE — 71045 X-RAY EXAM CHEST 1 VIEW: CPT

## 2021-08-03 PROCEDURE — 700102 HCHG RX REV CODE 250 W/ 637 OVERRIDE(OP): Performed by: INTERNAL MEDICINE

## 2021-08-03 PROCEDURE — 87493 C DIFF AMPLIFIED PROBE: CPT

## 2021-08-03 PROCEDURE — 93005 ELECTROCARDIOGRAM TRACING: CPT | Performed by: EMERGENCY MEDICINE

## 2021-08-03 PROCEDURE — A9270 NON-COVERED ITEM OR SERVICE: HCPCS | Performed by: NEUROLOGICAL SURGERY

## 2021-08-03 PROCEDURE — A9270 NON-COVERED ITEM OR SERVICE: HCPCS | Performed by: INTERNAL MEDICINE

## 2021-08-03 PROCEDURE — 93005 ELECTROCARDIOGRAM TRACING: CPT

## 2021-08-03 PROCEDURE — 700111 HCHG RX REV CODE 636 W/ 250 OVERRIDE (IP): Performed by: EMERGENCY MEDICINE

## 2021-08-03 PROCEDURE — 70450 CT HEAD/BRAIN W/O DYE: CPT | Mod: ME

## 2021-08-03 PROCEDURE — 85347 COAGULATION TIME ACTIVATED: CPT

## 2021-08-03 PROCEDURE — 700102 HCHG RX REV CODE 250 W/ 637 OVERRIDE(OP): Performed by: NEUROLOGICAL SURGERY

## 2021-08-03 PROCEDURE — 83605 ASSAY OF LACTIC ACID: CPT

## 2021-08-03 PROCEDURE — 700111 HCHG RX REV CODE 636 W/ 250 OVERRIDE (IP): Performed by: INTERNAL MEDICINE

## 2021-08-03 PROCEDURE — 85610 PROTHROMBIN TIME: CPT

## 2021-08-03 PROCEDURE — 99291 CRITICAL CARE FIRST HOUR: CPT

## 2021-08-03 PROCEDURE — 80053 COMPREHEN METABOLIC PANEL: CPT

## 2021-08-03 PROCEDURE — 85576 BLOOD PLATELET AGGREGATION: CPT

## 2021-08-03 PROCEDURE — 99291 CRITICAL CARE FIRST HOUR: CPT | Performed by: INTERNAL MEDICINE

## 2021-08-03 PROCEDURE — 84484 ASSAY OF TROPONIN QUANT: CPT

## 2021-08-03 PROCEDURE — 70551 MRI BRAIN STEM W/O DYE: CPT | Mod: MF

## 2021-08-03 RX ORDER — LEVOTHYROXINE SODIUM 137 UG/1
137 TABLET ORAL
Status: ON HOLD | COMMUNITY
End: 2021-08-14 | Stop reason: SDUPTHER

## 2021-08-03 RX ORDER — MECLIZINE HCL 12.5 MG/1
25 TABLET ORAL PRN
COMMUNITY
End: 2021-09-11

## 2021-08-03 RX ORDER — AMBRISENTAN 10 MG/1
10 TABLET, FILM COATED ORAL DAILY
Status: DISCONTINUED | OUTPATIENT
Start: 2021-08-04 | End: 2021-08-07

## 2021-08-03 RX ORDER — MYCOPHENOLATE MOFETIL 250 MG/1
250 CAPSULE ORAL 2 TIMES DAILY
Status: DISCONTINUED | OUTPATIENT
Start: 2021-08-03 | End: 2021-08-07

## 2021-08-03 RX ORDER — TACROLIMUS 1 MG/1
4 CAPSULE ORAL DAILY
Status: DISCONTINUED | OUTPATIENT
Start: 2021-08-03 | End: 2021-08-03

## 2021-08-03 RX ORDER — GABAPENTIN 100 MG/1
100 CAPSULE ORAL 2 TIMES DAILY
Status: ON HOLD | COMMUNITY
End: 2021-08-14 | Stop reason: SDUPTHER

## 2021-08-03 RX ORDER — AMBRISENTAN 10 MG/1
10 TABLET, FILM COATED ORAL DAILY
Status: ON HOLD | COMMUNITY
End: 2021-08-14 | Stop reason: SDUPTHER

## 2021-08-03 RX ORDER — BISACODYL 10 MG
10 SUPPOSITORY, RECTAL RECTAL
Status: DISCONTINUED | OUTPATIENT
Start: 2021-08-03 | End: 2021-08-04

## 2021-08-03 RX ORDER — AMOXICILLIN 250 MG
2 CAPSULE ORAL 2 TIMES DAILY
Status: DISCONTINUED | OUTPATIENT
Start: 2021-08-03 | End: 2021-08-03

## 2021-08-03 RX ORDER — FLUDROCORTISONE ACETATE 0.1 MG/1
0.5 TABLET ORAL DAILY
Status: ON HOLD | COMMUNITY
End: 2021-08-14 | Stop reason: SDUPTHER

## 2021-08-03 RX ORDER — OMEPRAZOLE 20 MG/1
20 CAPSULE, DELAYED RELEASE ORAL DAILY
Status: DISCONTINUED | OUTPATIENT
Start: 2021-08-04 | End: 2021-08-07

## 2021-08-03 RX ORDER — LEVETIRACETAM 500 MG/1
500 TABLET ORAL 2 TIMES DAILY
Status: ON HOLD | COMMUNITY
End: 2021-08-14

## 2021-08-03 RX ORDER — POLYETHYLENE GLYCOL 3350 17 G/17G
1 POWDER, FOR SOLUTION ORAL
Status: DISCONTINUED | OUTPATIENT
Start: 2021-08-03 | End: 2021-08-04

## 2021-08-03 RX ORDER — ALPRAZOLAM 1 MG/1
1 TABLET ORAL 3 TIMES DAILY PRN
COMMUNITY
End: 2021-09-30

## 2021-08-03 RX ORDER — GABAPENTIN 100 MG/1
100 CAPSULE ORAL 2 TIMES DAILY
Status: DISCONTINUED | OUTPATIENT
Start: 2021-08-03 | End: 2021-08-07

## 2021-08-03 RX ORDER — MYCOPHENOLATE MOFETIL 250 MG/1
250 CAPSULE ORAL 2 TIMES DAILY
Status: ON HOLD | COMMUNITY
End: 2021-08-14 | Stop reason: SDUPTHER

## 2021-08-03 RX ORDER — PRAVASTATIN SODIUM 20 MG
20 TABLET ORAL DAILY
Status: DISCONTINUED | OUTPATIENT
Start: 2021-08-04 | End: 2021-08-07

## 2021-08-03 RX ORDER — POTASSIUM CHLORIDE 7.45 MG/ML
10 INJECTION INTRAVENOUS ONCE
Status: COMPLETED | OUTPATIENT
Start: 2021-08-03 | End: 2021-08-03

## 2021-08-03 RX ORDER — AMOXICILLIN 250 MG
2 CAPSULE ORAL 2 TIMES DAILY
Status: DISCONTINUED | OUTPATIENT
Start: 2021-08-04 | End: 2021-08-04

## 2021-08-03 RX ORDER — BISACODYL 10 MG
10 SUPPOSITORY, RECTAL RECTAL
Status: DISCONTINUED | OUTPATIENT
Start: 2021-08-03 | End: 2021-08-03

## 2021-08-03 RX ORDER — ACETAMINOPHEN 325 MG/1
650 TABLET ORAL EVERY 6 HOURS PRN
Status: DISCONTINUED | OUTPATIENT
Start: 2021-08-03 | End: 2021-08-04

## 2021-08-03 RX ORDER — TACROLIMUS 1 MG/1
4 CAPSULE ORAL DAILY
Status: ON HOLD | COMMUNITY
End: 2021-08-14 | Stop reason: SDUPTHER

## 2021-08-03 RX ORDER — ACETAMINOPHEN 500 MG
500 TABLET ORAL EVERY 6 HOURS PRN
COMMUNITY
End: 2021-09-10

## 2021-08-03 RX ORDER — TADALAFIL 20 MG/1
20 TABLET ORAL
Status: DISCONTINUED | OUTPATIENT
Start: 2021-08-03 | End: 2021-08-17 | Stop reason: HOSPADM

## 2021-08-03 RX ORDER — POTASSIUM CHLORIDE 20 MEQ/1
40 TABLET, EXTENDED RELEASE ORAL ONCE
Status: COMPLETED | OUTPATIENT
Start: 2021-08-03 | End: 2021-08-03

## 2021-08-03 RX ORDER — METOCLOPRAMIDE 10 MG/1
10 TABLET ORAL PRN
COMMUNITY
End: 2021-09-11

## 2021-08-03 RX ORDER — FLUDROCORTISONE ACETATE 0.1 MG/1
0.5 TABLET ORAL DAILY
Status: DISCONTINUED | OUTPATIENT
Start: 2021-08-03 | End: 2021-08-07

## 2021-08-03 RX ORDER — M-VIT,TX,IRON,MINS/CALC/FOLIC 27MG-0.4MG
1 TABLET ORAL DAILY
Status: ON HOLD | COMMUNITY
End: 2022-01-01

## 2021-08-03 RX ORDER — ALPRAZOLAM 0.25 MG/1
0.5 TABLET ORAL 3 TIMES DAILY PRN
Status: DISCONTINUED | OUTPATIENT
Start: 2021-08-03 | End: 2021-08-07

## 2021-08-03 RX ORDER — TACROLIMUS 1 MG/1
4 CAPSULE ORAL DAILY
Status: DISCONTINUED | OUTPATIENT
Start: 2021-08-04 | End: 2021-08-07

## 2021-08-03 RX ORDER — SERTRALINE HYDROCHLORIDE 100 MG/1
100 TABLET, FILM COATED ORAL DAILY
Status: ON HOLD | COMMUNITY
End: 2021-08-14 | Stop reason: SDUPTHER

## 2021-08-03 RX ORDER — TADALAFIL 20 MG/1
20 TABLET ORAL
Status: ON HOLD | COMMUNITY
End: 2021-08-14 | Stop reason: SDUPTHER

## 2021-08-03 RX ORDER — NALOXEGOL OXALATE 25 MG/1
25 TABLET, FILM COATED ORAL DAILY
Status: ON HOLD | COMMUNITY
End: 2021-01-01

## 2021-08-03 RX ORDER — POLYETHYLENE GLYCOL 3350 17 G/17G
1 POWDER, FOR SOLUTION ORAL
Status: DISCONTINUED | OUTPATIENT
Start: 2021-08-03 | End: 2021-08-03

## 2021-08-03 RX ORDER — FUROSEMIDE 20 MG/1
20 TABLET ORAL DAILY
Status: ON HOLD | COMMUNITY
End: 2021-08-14 | Stop reason: SDUPTHER

## 2021-08-03 RX ORDER — PRAVASTATIN SODIUM 20 MG
20 TABLET ORAL DAILY
Status: ON HOLD | COMMUNITY
End: 2021-08-14 | Stop reason: SDUPTHER

## 2021-08-03 RX ORDER — LEVETIRACETAM 500 MG/1
500 TABLET ORAL 2 TIMES DAILY
Status: DISCONTINUED | OUTPATIENT
Start: 2021-08-04 | End: 2021-08-04

## 2021-08-03 RX ORDER — ONDANSETRON 4 MG/1
25 TABLET, ORALLY DISINTEGRATING ORAL EVERY 6 HOURS PRN
Status: ON HOLD | COMMUNITY
End: 2021-08-03

## 2021-08-03 RX ORDER — LEVETIRACETAM 500 MG/1
500 TABLET ORAL EVERY 12 HOURS
Status: DISCONTINUED | OUTPATIENT
Start: 2021-08-03 | End: 2021-08-03

## 2021-08-03 RX ADMIN — MYCOPHENOLATE MOFETIL 250 MG: 250 CAPSULE ORAL at 17:39

## 2021-08-03 RX ADMIN — GABAPENTIN 100 MG: 100 CAPSULE ORAL at 17:39

## 2021-08-03 RX ADMIN — ALPRAZOLAM 0.5 MG: 0.5 TABLET ORAL at 22:26

## 2021-08-03 RX ADMIN — POTASSIUM CHLORIDE 40 MEQ: 1500 TABLET, EXTENDED RELEASE ORAL at 14:33

## 2021-08-03 RX ADMIN — ACETAMINOPHEN 650 MG: 325 TABLET, FILM COATED ORAL at 14:33

## 2021-08-03 RX ADMIN — LEVETIRACETAM 500 MG: 500 TABLET ORAL at 14:33

## 2021-08-03 RX ADMIN — POTASSIUM CHLORIDE 10 MEQ: 7.46 INJECTION, SOLUTION INTRAVENOUS at 11:12

## 2021-08-03 RX ADMIN — TADALAFIL 20 MG: 20 TABLET ORAL at 21:00

## 2021-08-03 RX ADMIN — FLUDROCORTISONE ACETATE 0.5 MG: 0.1 TABLET ORAL at 17:39

## 2021-08-03 ASSESSMENT — PAIN DESCRIPTION - PAIN TYPE
TYPE: ACUTE PAIN

## 2021-08-03 ASSESSMENT — ENCOUNTER SYMPTOMS
DIARRHEA: 0
FOCAL WEAKNESS: 1
PALPITATIONS: 0
BLOOD IN STOOL: 0
HEADACHES: 1
SPUTUM PRODUCTION: 0
VOMITING: 0
NAUSEA: 0
SHORTNESS OF BREATH: 1
DOUBLE VISION: 0
CHILLS: 0
MUSCULOSKELETAL NEGATIVE: 1
ABDOMINAL PAIN: 0
SORE THROAT: 0
COUGH: 0
BRUISES/BLEEDS EASILY: 0
BLURRED VISION: 0
FEVER: 0
NERVOUS/ANXIOUS: 0

## 2021-08-03 ASSESSMENT — LIFESTYLE VARIABLES
ON A TYPICAL DAY WHEN YOU DRINK ALCOHOL HOW MANY DRINKS DO YOU HAVE: 0
ALCOHOL_USE: NO
DOES PATIENT WANT TO STOP DRINKING: NO
TOTAL SCORE: 0
HAVE PEOPLE ANNOYED YOU BY CRITICIZING YOUR DRINKING: NO
EVER FELT BAD OR GUILTY ABOUT YOUR DRINKING: NO
AVERAGE NUMBER OF DAYS PER WEEK YOU HAVE A DRINK CONTAINING ALCOHOL: 0
TOTAL SCORE: 0
TOTAL SCORE: 0
HAVE YOU EVER FELT YOU SHOULD CUT DOWN ON YOUR DRINKING: NO
CONSUMPTION TOTAL: NEGATIVE
EVER HAD A DRINK FIRST THING IN THE MORNING TO STEADY YOUR NERVES TO GET RID OF A HANGOVER: NO
HOW MANY TIMES IN THE PAST YEAR HAVE YOU HAD 5 OR MORE DRINKS IN A DAY: 0

## 2021-08-03 ASSESSMENT — PATIENT HEALTH QUESTIONNAIRE - PHQ9
1. LITTLE INTEREST OR PLEASURE IN DOING THINGS: MORE THAN HALF THE DAYS
5. POOR APPETITE OR OVEREATING: NOT AT ALL
9. THOUGHTS THAT YOU WOULD BE BETTER OFF DEAD, OR OF HURTING YOURSELF: NOT AT ALL
SUM OF ALL RESPONSES TO PHQ9 QUESTIONS 1 AND 2: 4
6. FEELING BAD ABOUT YOURSELF - OR THAT YOU ARE A FAILURE OR HAVE LET YOURSELF OR YOUR FAMILY DOWN: MORE THAN HALF THE DAYS
8. MOVING OR SPEAKING SO SLOWLY THAT OTHER PEOPLE COULD HAVE NOTICED. OR THE OPPOSITE, BEING SO FIGETY OR RESTLESS THAT YOU HAVE BEEN MOVING AROUND A LOT MORE THAN USUAL: MORE THAN HALF THE DAYS
4. FEELING TIRED OR HAVING LITTLE ENERGY: MORE THAN HALF THE DAYS
3. TROUBLE FALLING OR STAYING ASLEEP OR SLEEPING TOO MUCH: NEARLY EVERY DAY
SUM OF ALL RESPONSES TO PHQ QUESTIONS 1-9: 15
7. TROUBLE CONCENTRATING ON THINGS, SUCH AS READING THE NEWSPAPER OR WATCHING TELEVISION: MORE THAN HALF THE DAYS
2. FEELING DOWN, DEPRESSED, IRRITABLE, OR HOPELESS: MORE THAN HALF THE DAYS

## 2021-08-03 ASSESSMENT — COGNITIVE AND FUNCTIONAL STATUS - GENERAL
MOVING FROM LYING ON BACK TO SITTING ON SIDE OF FLAT BED: A LITTLE
SUGGESTED CMS G CODE MODIFIER MOBILITY: CJ
MOBILITY SCORE: 20
WALKING IN HOSPITAL ROOM: A LITTLE
STANDING UP FROM CHAIR USING ARMS: A LITTLE
SUGGESTED CMS G CODE MODIFIER DAILY ACTIVITY: CI
HELP NEEDED FOR BATHING: A LITTLE
DAILY ACTIVITIY SCORE: 23
CLIMB 3 TO 5 STEPS WITH RAILING: A LITTLE

## 2021-08-03 ASSESSMENT — FIBROSIS 4 INDEX: FIB4 SCORE: 3.22

## 2021-08-03 NOTE — ED NOTES
Unable to complete med rec at this time   Pt is unsure of what medications she is taking   Pt dose not have a med list with her  Pt states that she gets all of her medications from [place] (30 or so medications)   Called pt's  [facility] ([phone]) no answer, message was left

## 2021-08-03 NOTE — ED TRIAGE NOTES
"Roxana Cuba  61 y.o. F  Chief Complaint   Patient presents with   • Shortness of Breath     x 2 days. Patient states she has used 3L NC at home for the past 10 years. Patient states she has a \"pulmonary syndrome.\" Patient is 97% on RA. Patient reports her saturation decreased when laying down.    • Skin Lesion     Patient reports sarcoids x 10 years    • Headache     x 3 weeks     Patient comes in today to be checked out after returning from [facility] on Sunday after 2 years.     Vitals:    08/03/21 0448   BP: 126/74   Pulse: 80   Resp: 18   Temp: 36.6 °C (97.8 °F)   SpO2: 97%     Triage process explained to patient, apologized for wait time, and returned to lobby.  Pt informed to notify staff of any change in condition.     "

## 2021-08-03 NOTE — PROGRESS NOTES
Med Rec completed: per patient's  and daughter via phone.       Patient recently came from [facility]. Certain medications are [place] medications. Generic forms were added to medrec when available in EPIC. Will list [place] medications below for any questions.     -Resolor 2 mg (generic name: Prucalopride) - Added to medrec under prucalopride  -Propiomazin 25mg - Could not find in Epic, not added to medrec  -Trombyl 75mg - Could not find in Epic, not added to medrec  -Postafen 25mg (meclizine) - Added to medrec under meclizine   -Egazil 0.2mg (hyoscyamine) - Added to medrec under hyoscyamine  -Dimethikon 200mg - Could not find in Epic, not added to medrec    -Ondansetron 25mg - Confirmed with patient's  name of medication (with spelling and say back as well as dose).

## 2021-08-03 NOTE — ASSESSMENT & PLAN NOTE
Query component of dehydration?  We will not correct quickly with fluids given her subdural hematoma  Serial BMP

## 2021-08-03 NOTE — TELEPHONE ENCOUNTER
Received a vm on behave of patient's spouse. Requesting a call back and schedule the patient with Dr. Gaming regarding oxygen. No further information was left. Call back number left [phone]    Pt is currently admitted at [facility] 8/2/21 has seen Dr. Flores for pulmonary Critical Care    Next oV 8/4/21 Dr. Gaming     Last seen 3/14/19 Dr. Gaming.

## 2021-08-03 NOTE — PROGRESS NOTES
"Pt denies alcohol use. Daughter endorses daily drinking, unsure of amount, unsure of type. Daughter states pt drinks \"anything she can get her hands on.\" Daughter also states patient was taking Oxycodone several times per day for 'years' up until approximately 'this week'; unsure of dose.   "

## 2021-08-03 NOTE — PROGRESS NOTES
Full consult note to follow. HA, fall several weeks ago, correlates to significant mixed density SDH with mild shift. Will check MRI to look for extent of membranizatio.n Since patient intact per ED doctor, will go for left crani tomorrow 2nd case, npo midnight please, check labs and STAT covid test, 500mg keppra x 7 days, Q2 hr neuro checks overnight, appreciate hospitalist support.

## 2021-08-03 NOTE — PROGRESS NOTES
4 Eyes Skin Assessment Completed by Gregoria, PENNY and PENNY Ferro.    Head WDL  Ears WDL  Nose WDL  Mouth WDL  Neck WDL  Breast/Chest WDL  Shoulder Blades WDL  Spine WDL  (R) Arm/Elbow/Hand WDL  (L) Arm/Elbow/Hand WDL  Abdomen WDL  Groin WDL  Scrotum/Coccyx/Buttocks WDL  (R) Leg WDL  (L) Leg WDL  (R) Heel/Foot/Toe WDL  (L) Heel/Foot/Toe WDL          Devices In Places ECG, Blood Pressure Cuff, Pulse Ox, SCD's and Nasal Cannula      Interventions In Place Gray Ear Foams, Q2 Turns and Low Air Loss Mattress    Possible Skin Injury No    Pictures Uploaded Into Epic N/A  Wound Consult Placed N/A  RN Wound Prevention Protocol Ordered No

## 2021-08-03 NOTE — CONSULTS
Critical Care Consultation  (Admit H&P)    Date of consult: 8/3/2021    Referring Physician  Nirmal Cline M.D.    Reason for Consultation  SDH    History of Presenting Illness  61 y.o. female with a history of chronic respiratory failure pulmonary pretension on home O2 for 10 years and history of prior liver transplantation about 10 years ago of who presented 8/3/2021 with persisting/worsening headache and visual changes associated with a diagnosis after a fall about 5 weeks ago while in [place] she suffered a subdural hematoma without loss of consciousness.  We do not have old films but current film reveals a 23 mm hematoma and 8 mm shift and neurosurgery requested patient be placed in the ICU.  She stated she was in the hospital for little over 24 hours in [place] before she was discharged.  She has some right foot weakness with foot drop and that was present after her fall 5 weeks ago and is unchanged.  She is on a whole host of medicines but she does not recall their names.  She traveled to [facility] with family almost 2 years ago now and suffered pneumonia which grounded her for a while because she could not fly secondary to hypoxemia and then the pandemic hit and they would not let her travel.  5 weeks ago she had accidental fall slipping on a hardwood floor suffering that subdural hematoma.    Code Status  Full Code    Review of Systems  Review of Systems   Constitutional: Negative for chills, fever and malaise/fatigue.   HENT: Negative for sore throat.    Eyes: Negative for blurred vision and double vision.        Decreased vision to the left   Respiratory: Positive for shortness of breath (With exertion and she did have a spell when she was traveling via plane from [facility]). Negative for cough and sputum production.    Cardiovascular: Negative for chest pain, palpitations and leg swelling.   Gastrointestinal: Negative for abdominal pain, blood in stool, diarrhea, nausea and vomiting.   Genitourinary: Negative  for dysuria, hematuria and urgency.   Musculoskeletal: Negative.    Neurological: Positive for focal weakness and headaches.   Endo/Heme/Allergies: Does not bruise/bleed easily.   Psychiatric/Behavioral: The patient is not nervous/anxious.        Past Medical History   has a past medical history of Anemia, Anesthesia, Breath shortness, Bronchitis ( ), Cardiomegaly, Chronic fatigue and malaise, Cirrhosis ([facility]) (December 2011), CKD (chronic kidney disease) stage 3, GFR 30-59 ml/min ([facility]), Diabetes (HCC), Fracture of left foot, GERD (gastroesophageal reflux disease), H/O Clostridium difficile infection (02-17-17), Hemorrhagic disorder (HCC), Hiatus hernia syndrome, High cholesterol, Hypothyroid, Jaundice (2009), Liver transplanted ([facility]), Low back pain (02-17-17), Mild aortic regurgitation and aortic valve sclerosis ( ), On home oxygen therapy, Platelet disorder ([facility]), Pneumonia, Psychiatric disorder, Pulmonary hypertension ([facility]), S/P cholecystectomy, Small bowel obstruction ([facility]), Splenomegaly, and VRE (vancomycin-resistant Enterococci).    Surgical History   has a past surgical history that includes pr anesth,nose,sinus surgery; makayla by laparoscopy (9/19/2009); exam under anesthesia (11/11/2009); hysterectomy, total abdominal; gastroscopy-endo (3/12/2010); bronchoscopy-endo (5/29/2012); bronchoscopy-endo (6/19/2012); sigmoidoscopy flex (9/26/2012); recovery (2/27/2013); bronchoscopy-endo (11/15/2013); recovery (1/21/2014); recovery (3/24/2014); hemorrhoidectomy (11/11/2009); gastroscopy (9/28/2012); carpal tunnel release; bone marrow aspiration (12/31/2012); bone marrow biopsy, ndl/trocar (12/31/2012); recovery (3/31/2014); other abdominal surgery (December 2011); bone marrow aspiration (3/16/2015); bone marrow biopsy, ndl/trocar (3/16/2015); lung biopsy open (11/2016); tonsillectomy; other abdominal surgery (); breast biopsy (Left, 2/21/2017); colonoscopy (N/A, 3/27/2017); gastroscopy (N/A, 3/27/2017);  gastric bypass laparoscopic; hernia repair; makayla by laparoscopy; other; other (12/11/2017); other; turbinate reduction (Bilateral, 10/4/2018); nasal reconstruction (Bilateral, 10/4/2018); and ventral hernia repair robotic xi (N/A, 11/12/2018).    Family History  family history includes Alcohol/Drug in her maternal grandfather, maternal grandmother, and mother; Cancer in her paternal aunt; Diabetes in her father; Heart Disease in her father; Hyperlipidemia in her father and mother; Hypertension in her father; Non-contributory in her mother; Other in her father; Stroke in her father.    Social History   reports that she has never smoked. She has never used smokeless tobacco. She reports that she does not drink alcohol and does not use drugs.     Daughter reported to nurses that she had been taking pain pills and alcohol regularly while in [facility] and she had some concern about her use.    Medications  Home Medications     Reviewed by Colleen Fitzgerald (Pharmacy Tech) on 08/03/21 at 1042  Med List Status: Unable to Obtain   Medication Last Dose Status   ambrisentan (LETAIRIS) 10 MG tablet  Active   ascorbic acid (ASCORBIC ACID) 500 MG Tab  Active   aspirin 81 MG EC tablet  Active   bisacodyl ([employer] GENTLE LAXATIVE) 10 MG Suppos  Active   calcium citrate 315 mg-vitamin D 200 mg (CITRACAL+D) 315-200 MG-UNIT per tablet  Active   calcium polycarbophil (FIBERCON) 625 MG Tab  Active   cyclosporin (RESTASIS) 0.05 % ophthalmic emulsion  Active   docusate sodium (COLACE) 100 MG Cap  Active   fludrocortisone (FLORINEF) 0.1 MG Tab  Active   fluticasone (FLONASE) 50 MCG/ACT nasal spray  Active   furosemide (LASIX) 40 MG Tab  Active   gabapentin (NEURONTIN) 600 MG tablet  Active   GLUCAGON EMERGENCY 1 MG Kit  Active   lactulose 10 GM/15ML Solution  Active   levothyroxine (SYNTHROID) 137 MCG Tab  Active   Linaclotide (LINZESS) 290 MCG Cap  Active   midodrine (PROAMATINE) 10 MG tablet  Active   mycophenolate (CELLCEPT) 250 MG Cap   Active   NON SPECIFIED  Active   omeprazole (PRILOSEC) 40 MG delayed-release capsule  Active   ondansetron (ZOFRAN ODT) 4 MG TABLET DISPERSIBLE  Active   pravastatin (PRAVACHOL) 20 MG Tab  Active   predniSONE (DELTASONE) 1 MG Tab  Active   predniSONE (DELTASONE) 10 MG Tab  Active   predniSONE (DELTASONE) 5 MG Tab  Active   Probiotic Product (PROBIOTIC DAILY PO)  Active   sennosides (SENOKOT) 8.6 MG Tab  Active   sertraline (ZOLOFT) 100 MG Tab  Active   SPIRIVA HANDIHALER 18 MCG Cap  Active   tacrolimus (PROGRAF) 0.5 MG Cap  Active   tacrolimus (PROGRAF) 1 MG Cap  Active   tacrolimus (PROGRAF) 1 MG Cap  Active   Tadalafil, PAH, 20 MG Tab  Active   traZODone (DESYREL) 50 MG Tab  Active              Current Facility-Administered Medications   Medication Dose Route Frequency Provider Last Rate Last Admin   • [START ON 8/4/2021] senna-docusate (PERICOLACE or SENOKOT S) 8.6-50 MG per tablet 2 tablet  2 tablet Oral BID Venancio Flores M.D.        And   • polyethylene glycol/lytes (MIRALAX) PACKET 1 Packet  1 Packet Oral QDAY PRN Venancio Flores M.D.        And   • magnesium hydroxide (MILK OF MAGNESIA) suspension 30 mL  30 mL Oral QDAY PRN Venancio Flores M.D.        And   • bisacodyl (DULCOLAX) suppository 10 mg  10 mg Rectal QDAY PRN Venancio Flores M.D.       • levETIRAcetam (KEPPRA) tablet 500 mg  500 mg Oral Q12HRS Tramaine Arnold III, M.D.   500 mg at 08/03/21 1433   • acetaminophen (Tylenol) tablet 650 mg  650 mg Oral Q6HRS PRN Venancio Flores M.D.   650 mg at 08/03/21 1433       Allergies  Allergies   Allergen Reactions   • Rhubarb Anaphylaxis   • Nsaids      Can not take due to hx of liver transplant    • Organic Nitrates      Nitroglycerin products should be avoided with the use of PDE-5 inhibitors as the combination can result in severe hypotension.  RD clarified with pt: Nitrates in food are okay and pt does not want nitrates to be listed as food allergy. Pt only avoids medications with nitrates.     • Other Drug      Any medication that may interact with cyclosporine, tacrolimus, sirolimus, or prograf (due to hx of liver transplant)    • Vancomycin Hcl      Causes red man syndrome when infused to fast         Vital Signs last 24 hours  Temp:  [35.9 °C (96.7 °F)-36.6 °C (97.8 °F)] 35.9 °C (96.7 °F)  Pulse:  [63-84] 84  Resp:  [18-31] 31  BP: (100-152)/(55-74) 100/55  SpO2:  [81 %-98 %] 98 %    Physical Exam  Physical Exam  Vitals reviewed.   Constitutional:       General: She is not in acute distress.     Appearance: Normal appearance. She is normal weight. She is not ill-appearing.      Interventions: Nasal cannula in place.   HENT:      Head: Normocephalic.      Comments: Resolving bruise right orbital region and right temple/upper jaw     Mouth/Throat:      Mouth: Mucous membranes are moist.   Eyes:      General: No scleral icterus.     Extraocular Movements: Extraocular movements intact.      Pupils: Pupils are equal, round, and reactive to light.   Cardiovascular:      Rate and Rhythm: Normal rate and regular rhythm.  No extrasystoles are present.     Pulses: Normal pulses.      Heart sounds: No murmur heard.   No friction rub. No gallop.       Comments: SR  Pulmonary:      Effort: Pulmonary effort is normal.      Breath sounds: No wheezing, rhonchi or rales.   Abdominal:      General: Bowel sounds are normal. There is no distension.      Palpations: Abdomen is soft.      Tenderness: There is no abdominal tenderness. There is no right CVA tenderness, left CVA tenderness or guarding.   Musculoskeletal:      Cervical back: Neck supple. No rigidity.      Right lower leg: No edema.      Left lower leg: No edema.   Lymphadenopathy:      Cervical: No cervical adenopathy.   Skin:     General: Skin is warm and dry.      Capillary Refill: Capillary refill takes less than 2 seconds.      Coloration: Skin is not cyanotic or mottled.      Nails: There is no clubbing.   Neurological:      Mental Status: She is alert.       Comments: A&O x4, follows well, cranial nerves are intact although there is a hint of a left lateral field cut in the left eye only?  Sensation is intact diffusely, no pronator drift, cerebellar function while laying in bed is intact, there is loss of strength in flexing the right foot 3-4/5 and she said she has had right foot drop since the fall 5 weeks ago and its unchanged I did not try to ambulate her   Psychiatric:         Mood and Affect: Mood normal.         Behavior: Behavior normal. Behavior is cooperative.         Thought Content: Thought content normal.         Fluids    Intake/Output Summary (Last 24 hours) at 8/3/2021 1601  Last data filed at 8/3/2021 1400  Gross per 24 hour   Intake 220 ml   Output --   Net 220 ml       Laboratory  Recent Results (from the past 48 hour(s))   EKG    Collection Time: 21  5:11 AM   Result Value Ref Range    Report       [facility]    Test Date:  2021  Pt Name:    VICK LI             Department: ER  MRN:        [id number]                      Room:  Gender:     Female                       Technician: [id number]  :        1960                   Requested By:ER TRIAGE PROTOCOL  Order #:    [id number]                    Reading MD: Son Kinney MD    Measurements  Intervals                                Axis  Rate:       75                           P:          50  FL:         140                          QRS:        22  QRSD:       80                           T:          82  QT:         436  QTc:        487    Interpretive Statements  EKG is normal sinus rhythm, normal axis normal intervals no ST changes  consistent with acute regional ischemia  Electronically Signed On 8-3-2021 8:03:35 PDT by Son Kinney MD     CBC WITH DIFFERENTIAL    Collection Time: 21  6:56 AM   Result Value Ref Range    WBC 3.4 (L) 4.8 - 10.8 K/uL    RBC 4.11 (L) 4.20 - 5.40 M/uL    Hemoglobin 12.5 12.0 - 16.0 g/dL     Hematocrit 38.7 37.0 - 47.0 %    MCV 94.2 81.4 - 97.8 fL    MCH 30.4 27.0 - 33.0 pg    MCHC 32.3 (L) 33.6 - 35.0 g/dL    RDW 60.7 (H) 35.9 - 50.0 fL    Platelet Count 174 164 - 446 K/uL    MPV 9.2 9.0 - 12.9 fL    Neutrophils-Polys 50.30 44.00 - 72.00 %    Lymphocytes 34.50 22.00 - 41.00 %    Monocytes 8.60 0.00 - 13.40 %    Eosinophils 4.80 0.00 - 6.90 %    Basophils 0.90 0.00 - 1.80 %    Immature Granulocytes 0.90 0.00 - 0.90 %    Nucleated RBC 0.00 /100 WBC    Neutrophils (Absolute) 1.69 (L) 2.00 - 7.15 K/uL    Lymphs (Absolute) 1.16 1.00 - 4.80 K/uL    Monos (Absolute) 0.29 0.00 - 0.85 K/uL    Eos (Absolute) 0.16 0.00 - 0.51 K/uL    Baso (Absolute) 0.03 0.00 - 0.12 K/uL    Immature Granulocytes (abs) 0.03 0.00 - 0.11 K/uL    NRBC (Absolute) 0.00 K/uL   CMP    Collection Time: 08/03/21  6:56 AM   Result Value Ref Range    Sodium 148 (H) 135 - 145 mmol/L    Potassium 2.8 (L) 3.6 - 5.5 mmol/L    Chloride 109 96 - 112 mmol/L    Co2 25 20 - 33 mmol/L    Anion Gap 14.0 7.0 - 16.0    Glucose 82 65 - 99 mg/dL    Bun 10 8 - 22 mg/dL    Creatinine 0.51 0.50 - 1.40 mg/dL    Calcium 8.5 8.5 - 10.5 mg/dL    AST(SGOT) 40 12 - 45 U/L    ALT(SGPT) 19 2 - 50 U/L    Alkaline Phosphatase 84 30 - 99 U/L    Total Bilirubin 0.3 0.1 - 1.5 mg/dL    Albumin 3.3 3.2 - 4.9 g/dL    Total Protein 5.8 (L) 6.0 - 8.2 g/dL    Globulin 2.5 1.9 - 3.5 g/dL    A-G Ratio 1.3 g/dL   proBrain Natriuretic Peptide, NT    Collection Time: 08/03/21  6:56 AM   Result Value Ref Range    NT-proBNP 857 (H) 0 - 125 pg/mL   TROPONIN    Collection Time: 08/03/21  6:56 AM   Result Value Ref Range    Troponin T 23 (H) 6 - 19 ng/L   ESTIMATED GFR    Collection Time: 08/03/21  6:56 AM   Result Value Ref Range    GFR If African American >60 >60 mL/min/1.73 m 2    GFR If Non African American >60 >60 mL/min/1.73 m 2   LACTIC ACID    Collection Time: 08/03/21  8:29 AM   Result Value Ref Range    Lactic Acid 1.3 0.5 - 2.0 mmol/L   C Diff by PCR rflx Toxin    Collection  Time: 08/03/21 10:10 AM    Specimen: Stool   Result Value Ref Range    C Diff by PCR Negative Negative    [id number] Presumptive Negative Negative   PLATELET MAPPING WITH BASIC TEG    Collection Time: 08/03/21 11:17 AM   Result Value Ref Range    Reaction Time Initial-R 4.2 (L) 4.6 - 9.1 min    React Time Initial Hep 4.3 4.3 - 8.3 min    Clot Kinetics-K 1.3 0.8 - 2.1 min    Clot Angle-Angle 73.7 63.0 - 78.0 degrees    Maximum Clot Strength-MA 54.0 52.0 - 69.0 mm    TEG Functional Fibrinogen(MA) 15.4 15.0 - 32.0 mm    % Inhibition ADP 14.4 0.0 - 17.0 %    % Inhibition AA 41.3 (H) 0.0 - 11.0 %    TEG Algorithm Link Algorithm    PT/INR    Collection Time: 08/03/21 11:44 AM   Result Value Ref Range    PT 14.2 12.0 - 14.6 sec    INR 1.13 0.87 - 1.13       Imaging  MR-BRAIN-W/O   Final Result   Addendum 1 of 1   Findings were discussed with ROBERT BRYANT on 8/3/2021 1:44 PM.      Final      1.  There is subacute/chronic subdural and subarachnoid hemorrhages adjacent to the left frontal and temporal lobes. The maximum transverse dimension measures an approximately 29 mm. There is an approximately 6 mm midline shift towards right side.   2.  Mild cerebral volume loss.   3.  Mild chronic microvascular ischemic disease.      CT-HEAD W/O   Final Result      23 mm thick mixed density left frontotemporal convexity subdural hematoma with mass effect including 8 mm rightward midline shift/subfalcine herniation.      These findings were discussed with ROBERT BRYANT on 8/3/2021 9:01 AM.      DX-ABDOMEN COMPLETE WITH AP OR PA CXR   Final Result      1.  Small left pleural effusion.   2.  Moderate amount of colonic stool.   3.  No evidence of bowel obstruction.   4.  Scoliosis.      DX-CHEST-PORTABLE (1 VIEW)    (Results Pending)   EC-ECHOCARDIOGRAM COMPLETE W/ CONT    (Results Pending)       Assessment/Plan  * Traumatic subdural hematoma without loss of consciousness (HCC)  Assessment & Plan  Status post fall approximately 5  weeks ago in [facility] with 2-day admit and diagnosis of subdural hematoma  Headache never resolved and is worse today associated with some vision changes which prompted the ER visit    CT scan head:23 mm thick mixed density left frontotemporal convexity subdural hematoma with mass effect including 8 mm rightward midline shift/subfalcine herniation.  Consult neurosurgery  Admit to ICU  Serial neuro checks  TEG with mapping grossly normal  Follow-up CT head for neuro changes or in a.m.  Okay to feed per neurosurgery, no surgery planned for today or tonight  Sequentials, no heparins  Goal systolic blood pressure less than equal to 160  Seizure precautions and Keppra seizure prophylaxis per NS    Chronic respiratory failure with hypoxia (HCC)- (present on admission)  Assessment & Plan  Patient chronically on oxygen query related to complications of transplant and pulmonary pretension, patient not clear about why  Patient on 5 L nasal cannula O2 when she lived here 2 years ago, on room air after pneumonia in [place] cleared  Currently on 2 L O2 with excellent saturations  RT protocols  Obtain chest x-ray  Incentive spirometry    Pulmonary hypertension (HCC)- (present on admission)  Assessment & Plan  History of pulmonary hypertension:  Initial evaluation at [facility] pre liver transplant, PFO closed w/o complication, 1/25/2011, then worse PAH and R HF post transplant, extensive evaluations at [facility] in  with cath and drug trial, responding to tadalafil and ambrisentan dramatically. Followed there with serial R heart cath.  3/25/2014: Most recent R heart cath done Dr. Brenda Flores with mild pulmonary hypertension and relatively high ouput  November 2014: Echocardiogram with normal LV size, LVEF 60-65%. Mild MR, mild AI, mild TR, RVSP 29-34mmHg.  February 2015: Echocardiogram with mild concentric LVH, LVEF 65-70%. Mild MR, mild AI, trace TR, RVSP 16mmHg.    Clinically not in right heart failure  BNP mildly elevated at  857  Repeat echocardiogram w/Doppler  Off medications per patient      Status post liver transplant Dr. Canada ([facility])- (present on admission)  Assessment & Plan  -December 2011: Status post liver transplant for end stage liver disease at [facility], followed by Dr. Canada.  Patient does not know her own medications  Working with pharmacy to get med rec from daughter who speaks English and Tristanian, medications apparently all in Tristanian    History of pneumonia  Assessment & Plan  Hx of PNA approximately 2 years ago in [place]  Grounded from air travel secondary to hypoxemia and then pandemic hip [place] and she has been in [place] until 2 days ago  Respiratory symptoms have resolved and she is at baseline  Covid test pending, sent from ER    Hypernatremia  Assessment & Plan  Query component of dehydration?  We will not correct quickly with fluids given her subdural hematoma  Serial BMP    Hypokalemia  Assessment & Plan  Replete    Patient not the best historian and she does not know her medications but stated they were upwards of 30.  Patient's daughter apparently speaks she read is an English and pharmacy is reaching out to help complete med rec.  Case reviewed with pharmacy at length and will initiate her home medications especially her transplant meds for her liver when we have the list.    Awaiting input from neurosurgery, emergency room physician reviewed the case with Dr. Arnold.    Discussed patient condition and risk of morbidity and/or mortality with RN, Patient and ERP.      The patient remains critically ill.  Critical care time = 40 minutes in directly providing and coordinating critical care and extensive data review.  No time overlap and excludes procedures.

## 2021-08-03 NOTE — ASSESSMENT & PLAN NOTE
Patient chronically on oxygen query related to complications of transplant and pulmonary pretension, patient not clear about why  Patient on 5 L nasal cannula O2 when she lived here 2 years ago, on room air after pneumonia in [place] cleared  Currently on 2 L O2 with excellent saturations  RT protocols  Obtain chest x-ray  Incentive spirometry

## 2021-08-03 NOTE — ASSESSMENT & PLAN NOTE
Hx of PNA approximately 2 years ago in [facility]  Grounded from air travel secondary to hypoxemia and then pandemic hip [facility] and she has been in [place] until 2 days ago  Respiratory symptoms have resolved and she is at baseline  Covid test pending, sent from ER

## 2021-08-03 NOTE — ASSESSMENT & PLAN NOTE
Status post fall approximately 5 weeks ago in [facility] with 2-day admit and diagnosis of subdural hematoma  Headache never resolved and is worse today associated with some vision changes which prompted the ER visit    CT scan head:23 mm thick mixed density left frontotemporal convexity subdural hematoma with mass effect including 8 mm rightward midline shift/subfalcine herniation.  Consult neurosurgery  Admit to ICU  Serial neuro checks  TEG with mapping grossly normal  Follow-up CT head for neuro changes or in a.m.  Okay to feed per neurosurgery, no surgery planned for today or tonight  Sequentials, no heparins  Goal systolic blood pressure less than equal to 160  Seizure precautions and Keppra seizure prophylaxis per NS

## 2021-08-03 NOTE — ED NOTES
Pt tested for room air oxygenation status and capability. Pt was able to maintain a low 90% saturation while sitting upright. When Pt was laid down, O2 saturations dropped down to 81%. When Pt sat back up, O2 saturations cale back up to the 90%s. While ambulating Pt was able to maintain low 90% saturations.

## 2021-08-03 NOTE — ASSESSMENT & PLAN NOTE
History of pulmonary hypertension:  Initial evaluation at [facility] pre liver transplant, PFO closed w/o complication, 1/25/2011, then worse PAH and R HF post transplant, extensive evaluations at [facility] in  with cath and drug trial, responding to tadalafil and ambrisentan dramatically. Followed there with serial R heart cath.  3/25/2014: Most recent R heart cath done Dr. Brenda Flores with mild pulmonary hypertension and relatively high ouput  November 2014: Echocardiogram with normal LV size, LVEF 60-65%. Mild MR, mild AI, mild TR, RVSP 29-34mmHg.  February 2015: Echocardiogram with mild concentric LVH, LVEF 65-70%. Mild MR, mild AI, trace TR, RVSP 16mmHg.    Clinically not in right heart failure  BNP mildly elevated at 857  Repeat echocardiogram w/Doppler  Off medications per patient

## 2021-08-03 NOTE — ED PROVIDER NOTES
"ED Provider Note    CHIEF COMPLAINT  Chief Complaint   Patient presents with   • Shortness of Breath     x 2 days. Patient states she has used 3L NC at home for the past 10 years. Patient states she has a \"pulmonary syndrome.\" Patient is 97% on RA. Patient reports her saturation decreased when laying down.    • Skin Lesion     Patient reports sarcoids x 10 years    • Headache     x 3 weeks       HPI  Roxana Cuba is a 61 y.o. female with complicated medical history including sarcoidosis, prior liver transplant, gastric bypass CKD, and pulmonary hypertension here with multiple complaints.    Patient reports HA  Patient reports she has had a headache for 4 weeks. Patient slipped on some water while she was in [facility], she was seen at hospital where head CT showed 2 frontal bleeds that were apparently nonoperable.  Since that time patient reports she is an ongoing frontal headache with some vague bilateral vision changes.  Pt reports some associated nausea and 1 episode of vomiting per/day    Shortness of Breath  Patient also reports shortness of breath. Patient reports that she has a history of pulmonary HTN, COPD. She reports that when she was living in [facility] she did need to be on oxygen however whenever she is in the [place] she typically requires oxygen for shortness of breath, this is thought to be secondary to the considerable increase in elevation.  Patient has been off for COPD inhaled corticosteroids which she normally takes while she is in [place].  She has not been here for the last 2 years.  Patient reports feeling short of breath almost immediately upon arriving in [place] and lying down    Patient also reports abdominal pain.  Patient apparently had a \" twisting of the bowel\" in [facility], approximately 5 weeks ago, she underwent laparotomy there at that time.  Patient is concerned that the sutures are not healing.  She has some very vague associated lower abdominal pain.  Patient reports multiple episodes of " diarrhea.  Without any blood or melena.    REVIEW OF SYSTEMS  ROS    See HPI for further details. All other systems are negative.     PAST MEDICAL HISTORY   has a past medical history of Anemia, Anesthesia, Breath shortness, Bronchitis ( ), Cardiomegaly, Chronic fatigue and malaise, Cirrhosis (HCC) (December 2011), CKD (chronic kidney disease) stage 3, GFR 30-59 ml/min (HCC), Diabetes (HCC), Fracture of left foot, GERD (gastroesophageal reflux disease), H/O Clostridium difficile infection (02-17-17), Hemorrhagic disorder (HCC), Hiatus hernia syndrome, High cholesterol, Hypothyroid, Jaundice (2009), Liver transplanted ([facility]), Low back pain (02-17-17), Mild aortic regurgitation and aortic valve sclerosis ( ), On home oxygen therapy, Platelet disorder (HCC), Pneumonia, Psychiatric disorder, Pulmonary hypertension ([facility]), S/P cholecystectomy, Small bowel obstruction ([facility]), Splenomegaly, and VRE (vancomycin-resistant Enterococci).    SOCIAL HISTORY  Social History     Tobacco Use   • Smoking status: Never Smoker   • Smokeless tobacco: Never Used   • Tobacco comment: avoid all tobacco products   Vaping Use   • Vaping Use: Never used   Substance and Sexual Activity   • Alcohol use: No     Alcohol/week: 0.0 oz     Comment: 05/2009 quit drinking   • Drug use: No   • Sexual activity: Not on file       SURGICAL HISTORY   has a past surgical history that includes anesth,nose,sinus surgery; makayla by laparoscopy (9/19/2009); exam under anesthesia (11/11/2009); hysterectomy, total abdominal; gastroscopy-endo (3/12/2010); bronchoscopy-endo (5/29/2012); bronchoscopy-endo (6/19/2012); sigmoidoscopy flex (9/26/2012); recovery (2/27/2013); bronchoscopy-endo (11/15/2013); recovery (1/21/2014); recovery (3/24/2014); hemorrhoidectomy (11/11/2009); gastroscopy (9/28/2012); carpal tunnel release; bone marrow aspiration (12/31/2012); bone marrow biopsy, ndl/trocar (12/31/2012); recovery (3/31/2014); other abdominal surgery (December 2011);  bone marrow aspiration (3/16/2015); bone marrow biopsy, ndl/trocar (3/16/2015); lung biopsy open (11/2016); tonsillectomy; other abdominal surgery (); breast biopsy (Left, 2/21/2017); colonoscopy (N/A, 3/27/2017); gastroscopy (N/A, 3/27/2017); gastric bypass laparoscopic; hernia repair; makayla by laparoscopy; other; other (12/11/2017); other; turbinate reduction (Bilateral, 10/4/2018); nasal reconstruction (Bilateral, 10/4/2018); and ventral hernia repair robotic xi (N/A, 11/12/2018).    CURRENT MEDICATIONS  Home Medications     Reviewed by Kaelyn Sanders R.N. (Registered Nurse) on 08/03/21 at 0503  Med List Status: Not Addressed   Medication Last Dose Status   ambrisentan (LETAIRIS) 10 MG tablet  Active   ascorbic acid (ASCORBIC ACID) 500 MG Tab  Active   aspirin 81 MG EC tablet  Active   bisacodyl ([employer] GENTLE LAXATIVE) 10 MG Suppos  Active   calcium citrate 315 mg-vitamin D 200 mg (CITRACAL+D) 315-200 MG-UNIT per tablet  Active   calcium polycarbophil (FIBERCON) 625 MG Tab  Active   cyclosporin (RESTASIS) 0.05 % ophthalmic emulsion  Active   docusate sodium (COLACE) 100 MG Cap  Active   fludrocortisone (FLORINEF) 0.1 MG Tab  Active   fluticasone (FLONASE) 50 MCG/ACT nasal spray  Active   furosemide (LASIX) 40 MG Tab  Active   gabapentin (NEURONTIN) 600 MG tablet  Active   GLUCAGON EMERGENCY 1 MG Kit  Active   lactulose 10 GM/15ML Solution  Active   levothyroxine (SYNTHROID) 137 MCG Tab  Active   Linaclotide (LINZESS) 290 MCG Cap  Active   midodrine (PROAMATINE) 10 MG tablet  Active   mycophenolate (CELLCEPT) 250 MG Cap  Active   NON SPECIFIED  Active   omeprazole (PRILOSEC) 40 MG delayed-release capsule  Active   ondansetron (ZOFRAN ODT) 4 MG TABLET DISPERSIBLE  Active   pravastatin (PRAVACHOL) 20 MG Tab  Active   predniSONE (DELTASONE) 1 MG Tab  Active   predniSONE (DELTASONE) 10 MG Tab  Active   predniSONE (DELTASONE) 5 MG Tab  Active   Probiotic Product (PROBIOTIC DAILY PO)  Active   sennosides  (SENOKOT) 8.6 MG Tab  Active   sertraline (ZOLOFT) 100 MG Tab  Active   SPIRIVA HANDIHALER 18 MCG Cap  Active   tacrolimus (PROGRAF) 0.5 MG Cap  Active   tacrolimus (PROGRAF) 1 MG Cap  Active   tacrolimus (PROGRAF) 1 MG Cap  Active   Tadalafil, PAH, 20 MG Tab  Active   traZODone (DESYREL) 50 MG Tab  Active                ALLERGIES  Allergies   Allergen Reactions   • Rhubarb Anaphylaxis   • Acetaminophen      Can not take, hx of liver transplant    • Nsaids      Can not take due to hx of liver transplant    • Organic Nitrates      Nitroglycerin products should be avoided with the use of PDE-5 inhibitors as the combination can result in severe hypotension.  RD clarified with pt: Nitrates in food are okay and pt does not want nitrates to be listed as food allergy. Pt only avoids medications with nitrates.    • Other Drug      Any medication that may interact with cyclosporine, tacrolimus, sirolimus, or prograf (due to hx of liver transplant)    • Vancomycin Hcl      Causes red man syndrome when infused to fast         PHYSICAL EXAM  Vitals:    08/03/21 0448   BP: 126/74   Pulse: 80   Resp: 18   Temp: 36.6 °C (97.8 °F)   SpO2: 97%       Physical Exam  Constitutional:       Appearance: She is well-developed.   HENT:      Head: Normocephalic.      Comments: Scattered ecchymosis mostly pronounced on patient's right forehead  Eyes:      Conjunctiva/sclera: Conjunctivae normal.   Cardiovascular:      Rate and Rhythm: Normal rate and regular rhythm.   Pulmonary:      Effort: Pulmonary effort is normal.      Breath sounds: Normal breath sounds.      Comments: Patient desats to around 82% when lying flat  Abdominal:      General: Bowel sounds are normal. There is no distension.      Palpations: Abdomen is soft.      Tenderness: There is no abdominal tenderness. There is no rebound.   Musculoskeletal:      Cervical back: Normal range of motion and neck supple.   Skin:     General: Skin is warm and dry.      Findings: No rash.    Neurological:      Mental Status: She is alert and oriented to person, place, and time.      Comments: Distal and proximal strength 5/5 in upper and lower extremities. Normal gait. No dysmetria. No sensation deficits. No visual field deficits. Cranial nerves intact.      Psychiatric:         Behavior: Behavior normal.           DIAGNOSTIC STUDIES / PROCEDURES    EKG  See below    LABS  Results for orders placed or performed during the hospital encounter of 08/03/21   C Diff by PCR rflx Toxin    Specimen: Stool   Result Value Ref Range    C Diff by PCR Negative Negative    [id number] Presumptive Negative Negative   CBC WITH DIFFERENTIAL   Result Value Ref Range    WBC 3.4 (L) 4.8 - 10.8 K/uL    RBC 4.11 (L) 4.20 - 5.40 M/uL    Hemoglobin 12.5 12.0 - 16.0 g/dL    Hematocrit 38.7 37.0 - 47.0 %    MCV 94.2 81.4 - 97.8 fL    MCH 30.4 27.0 - 33.0 pg    MCHC 32.3 (L) 33.6 - 35.0 g/dL    RDW 60.7 (H) 35.9 - 50.0 fL    Platelet Count 174 164 - 446 K/uL    MPV 9.2 9.0 - 12.9 fL    Neutrophils-Polys 50.30 44.00 - 72.00 %    Lymphocytes 34.50 22.00 - 41.00 %    Monocytes 8.60 0.00 - 13.40 %    Eosinophils 4.80 0.00 - 6.90 %    Basophils 0.90 0.00 - 1.80 %    Immature Granulocytes 0.90 0.00 - 0.90 %    Nucleated RBC 0.00 /100 WBC    Neutrophils (Absolute) 1.69 (L) 2.00 - 7.15 K/uL    Lymphs (Absolute) 1.16 1.00 - 4.80 K/uL    Monos (Absolute) 0.29 0.00 - 0.85 K/uL    Eos (Absolute) 0.16 0.00 - 0.51 K/uL    Baso (Absolute) 0.03 0.00 - 0.12 K/uL    Immature Granulocytes (abs) 0.03 0.00 - 0.11 K/uL    NRBC (Absolute) 0.00 K/uL   CMP   Result Value Ref Range    Sodium 148 (H) 135 - 145 mmol/L    Potassium 2.8 (L) 3.6 - 5.5 mmol/L    Chloride 109 96 - 112 mmol/L    Co2 25 20 - 33 mmol/L    Anion Gap 14.0 7.0 - 16.0    Glucose 82 65 - 99 mg/dL    Bun 10 8 - 22 mg/dL    Creatinine 0.51 0.50 - 1.40 mg/dL    Calcium 8.5 8.5 - 10.5 mg/dL    AST(SGOT) 40 12 - 45 U/L    ALT(SGPT) 19 2 - 50 U/L    Alkaline Phosphatase 84 30 - 99 U/L     Total Bilirubin 0.3 0.1 - 1.5 mg/dL    Albumin 3.3 3.2 - 4.9 g/dL    Total Protein 5.8 (L) 6.0 - 8.2 g/dL    Globulin 2.5 1.9 - 3.5 g/dL    A-G Ratio 1.3 g/dL   LACTIC ACID   Result Value Ref Range    Lactic Acid 1.3 0.5 - 2.0 mmol/L   proBrain Natriuretic Peptide, NT   Result Value Ref Range    NT-proBNP 857 (H) 0 - 125 pg/mL   TROPONIN   Result Value Ref Range    Troponin T 23 (H) 6 - 19 ng/L   ESTIMATED GFR   Result Value Ref Range    GFR If African American >60 >60 mL/min/1.73 m 2    GFR If Non African American >60 >60 mL/min/1.73 m 2   PLATELET MAPPING WITH BASIC TEG   Result Value Ref Range    Reaction Time Initial-R 4.2 (L) 4.6 - 9.1 min    React Time Initial Hep 4.3 4.3 - 8.3 min    Clot Kinetics-K 1.3 0.8 - 2.1 min    Clot Angle-Angle 73.7 63.0 - 78.0 degrees    Maximum Clot Strength-MA 54.0 52.0 - 69.0 mm    TEG Functional Fibrinogen(MA) 15.4 15.0 - 32.0 mm    % Inhibition ADP 14.4 0.0 - 17.0 %    % Inhibition AA 41.3 (H) 0.0 - 11.0 %    TEG Algorithm Link Algorithm    PT/INR   Result Value Ref Range    PT 14.2 12.0 - 14.6 sec    INR 1.13 0.87 - 1.13   EKG   Result Value Ref Range    Report       [facility] Emergency Dept.    Test Date:  2021  Pt Name:    VICK LI             Department: ER  MRN:        [id number]                      Room:  Gender:     Female                       Technician: [id number]  :        1960                   Requested By:ER TRIAGE PROTOCOL  Order #:    [id number]                    Lupe MD: Son Kinney MD    Measurements  Intervals                                Axis  Rate:       75                           P:          50  NH:         140                          QRS:        22  QRSD:       80                           T:          82  QT:         436  QTc:        487    Interpretive Statements  EKG is normal sinus rhythm, normal axis normal intervals no ST changes  consistent with acute regional ischemia  Electronically Signed On  8-3-2021 8:03:35 PDT by Son Kinney MD       *Note: Due to a large number of results and/or encounters for the requested time period, some results have not been displayed. A complete set of results can be found in Results Review.         RADIOLOGY  MR-BRAIN-W/O   Final Result   Addendum 1 of 1   Findings were discussed with ROBERT BRYANT on 8/3/2021 1:44 PM.      Final      1.  There is subacute/chronic subdural and subarachnoid hemorrhages adjacent to the left frontal and temporal lobes. The maximum transverse dimension measures an approximately 29 mm. There is an approximately 6 mm midline shift towards right side.   2.  Mild cerebral volume loss.   3.  Mild chronic microvascular ischemic disease.      CT-HEAD W/O   Final Result      23 mm thick mixed density left frontotemporal convexity subdural hematoma with mass effect including 8 mm rightward midline shift/subfalcine herniation.      These findings were discussed with ROBERT BRYANT on 8/3/2021 9:01 AM.      DX-ABDOMEN COMPLETE WITH AP OR PA CXR   Final Result      1.  Small left pleural effusion.   2.  Moderate amount of colonic stool.   3.  No evidence of bowel obstruction.   4.  Scoliosis.              COURSE & MEDICAL DECISION MAKING  Pertinent Labs & Imaging studies reviewed. (See chart for details)    Patient here with symptoms consistent with ongoing headache related to recent ICH.  Possible worsening symptoms given her associated new nausea and vomiting, will check CT to evaluate for extent of the subdural to see if possible neurosurgical management would be indicated.  Patient symptoms of orthopnea is consistent with likely worsening her ongoing pulmonary hypertension.  Patient is requesting oxygen so she can sleep at night.  Possible developing right sided heart failure however patient does not have any associated lower extremity edema on exam.  Patient also with lower abdominal pain the diarrhea, questionable C. difficile given the recent  procedure and patient does have a history of C. difficile in the past.  I have sent C. difficile testing.  Patient CT reveals subdural and subarachnoid, likely subacute.  I discussed this with Dr. Arnold of neurosurgery who would like patient to undergo MRI and will see patient for possible appropriateness of surgical management.  Patient basic labs reveal hyponatremia and hypokalemia.  Patient will be admitted for ongoing work-up.  I discussed the case with neuro intensivist who has agreed to admit.  C. difficile is negative    The patient will return for worsening symptoms and is stable at the time of discharge. The patient verbalizes understanding and will comply.    FINAL IMPRESSION  1.  Subacute subdural hematoma, diarrhea, hypoxia, orthopnea      Electronically signed by: Nirmal Cline M.D., 8/3/2021 7:02 AM

## 2021-08-03 NOTE — ASSESSMENT & PLAN NOTE
-December 2011: Status post liver transplant for end stage liver disease at [facility], followed by Dr. Canada.  Patient does not know her own medications  Working with pharmacy to get med rec from daughter who speaks English and Malian, medications apparently all in Malian

## 2021-08-03 NOTE — PROGRESS NOTES
Pt picked up from ER by this RN and transported on monitor to R109. VSS during transport. Pt reports 8/10 headache, no PRNs available. Chart lists Acetaminophen as allergy, pt denies allergy. Potassium in ER 2.8 w/ 10 meq given in ER.  Orders received and discussed with pharmacist who states allergy is r/t liver transplant and removes allergy from list.

## 2021-08-04 ENCOUNTER — APPOINTMENT (OUTPATIENT)
Dept: CARDIOLOGY | Facility: MEDICAL CENTER | Age: 61
DRG: 026 | End: 2021-08-04
Attending: INTERNAL MEDICINE
Payer: MEDICARE

## 2021-08-04 ENCOUNTER — APPOINTMENT (OUTPATIENT)
Dept: RADIOLOGY | Facility: MEDICAL CENTER | Age: 61
DRG: 026 | End: 2021-08-04
Attending: INTERNAL MEDICINE
Payer: MEDICARE

## 2021-08-04 ENCOUNTER — APPOINTMENT (OUTPATIENT)
Dept: SLEEP MEDICINE | Facility: MEDICAL CENTER | Age: 61
End: 2021-08-04
Payer: MEDICARE

## 2021-08-04 ENCOUNTER — ANESTHESIA (OUTPATIENT)
Dept: SURGERY | Facility: MEDICAL CENTER | Age: 61
DRG: 026 | End: 2021-08-04
Payer: MEDICARE

## 2021-08-04 ENCOUNTER — ANESTHESIA EVENT (OUTPATIENT)
Dept: SURGERY | Facility: MEDICAL CENTER | Age: 61
DRG: 026 | End: 2021-08-04
Payer: MEDICARE

## 2021-08-04 LAB
ALBUMIN SERPL BCP-MCNC: 2.6 G/DL (ref 3.2–4.9)
ALBUMIN/GLOB SERPL: 1.4 G/DL
ALP SERPL-CCNC: 67 U/L (ref 30–99)
ALT SERPL-CCNC: 10 U/L (ref 2–50)
ANION GAP SERPL CALC-SCNC: 6 MMOL/L (ref 7–16)
AST SERPL-CCNC: 24 U/L (ref 12–45)
BILIRUB SERPL-MCNC: 0.3 MG/DL (ref 0.1–1.5)
BUN SERPL-MCNC: 12 MG/DL (ref 8–22)
CALCIUM SERPL-MCNC: 7.6 MG/DL (ref 8.5–10.5)
CHLORIDE SERPL-SCNC: 108 MMOL/L (ref 96–112)
CO2 SERPL-SCNC: 26 MMOL/L (ref 20–33)
CREAT SERPL-MCNC: 0.59 MG/DL (ref 0.5–1.4)
ERYTHROCYTE [DISTWIDTH] IN BLOOD BY AUTOMATED COUNT: 60.1 FL (ref 35.9–50)
GLOBULIN SER CALC-MCNC: 1.8 G/DL (ref 1.9–3.5)
GLUCOSE BLD-MCNC: 105 MG/DL (ref 65–99)
GLUCOSE SERPL-MCNC: 109 MG/DL (ref 65–99)
HCT VFR BLD AUTO: 31.9 % (ref 37–47)
HGB BLD-MCNC: 10.2 G/DL (ref 12–16)
MAGNESIUM SERPL-MCNC: 1.6 MG/DL (ref 1.5–2.5)
MCH RBC QN AUTO: 30.3 PG (ref 27–33)
MCHC RBC AUTO-ENTMCNC: 32 G/DL (ref 33.6–35)
MCV RBC AUTO: 94.7 FL (ref 81.4–97.8)
PLATELET # BLD AUTO: 146 K/UL (ref 164–446)
PMV BLD AUTO: 9.5 FL (ref 9–12.9)
POTASSIUM SERPL-SCNC: 3 MMOL/L (ref 3.6–5.5)
PROT SERPL-MCNC: 4.4 G/DL (ref 6–8.2)
RBC # BLD AUTO: 3.37 M/UL (ref 4.2–5.4)
SARS-COV+SARS-COV-2 AG RESP QL IA.RAPID: NOTDETECTED
SODIUM SERPL-SCNC: 140 MMOL/L (ref 135–145)
SPECIMEN SOURCE: NORMAL
WBC # BLD AUTO: 3.7 K/UL (ref 4.8–10.8)

## 2021-08-04 PROCEDURE — 500331 HCHG COTTONOID, SURG PATTIE: Performed by: NEUROLOGICAL SURGERY

## 2021-08-04 PROCEDURE — 500393 HCHG DRAIN, VENTRIC CATH: Performed by: NEUROLOGICAL SURGERY

## 2021-08-04 PROCEDURE — 700111 HCHG RX REV CODE 636 W/ 250 OVERRIDE (IP): Performed by: STUDENT IN AN ORGANIZED HEALTH CARE EDUCATION/TRAINING PROGRAM

## 2021-08-04 PROCEDURE — 87426 SARSCOV CORONAVIRUS AG IA: CPT

## 2021-08-04 PROCEDURE — 83735 ASSAY OF MAGNESIUM: CPT

## 2021-08-04 PROCEDURE — 85027 COMPLETE CBC AUTOMATED: CPT

## 2021-08-04 PROCEDURE — 700111 HCHG RX REV CODE 636 W/ 250 OVERRIDE (IP): Performed by: INTERNAL MEDICINE

## 2021-08-04 PROCEDURE — 700101 HCHG RX REV CODE 250: Performed by: NEUROLOGICAL SURGERY

## 2021-08-04 PROCEDURE — 700105 HCHG RX REV CODE 258: Performed by: ANESTHESIOLOGY

## 2021-08-04 PROCEDURE — 700111 HCHG RX REV CODE 636 W/ 250 OVERRIDE (IP): Performed by: EMERGENCY MEDICINE

## 2021-08-04 PROCEDURE — 700111 HCHG RX REV CODE 636 W/ 250 OVERRIDE (IP): Performed by: PHYSICIAN ASSISTANT

## 2021-08-04 PROCEDURE — 80053 COMPREHEN METABOLIC PANEL: CPT

## 2021-08-04 PROCEDURE — 700102 HCHG RX REV CODE 250 W/ 637 OVERRIDE(OP): Performed by: ANESTHESIOLOGY

## 2021-08-04 PROCEDURE — A9270 NON-COVERED ITEM OR SERVICE: HCPCS | Performed by: PHYSICIAN ASSISTANT

## 2021-08-04 PROCEDURE — 500378 HCHG DRAIN, J-VAC ROUND 19FR: Performed by: NEUROLOGICAL SURGERY

## 2021-08-04 PROCEDURE — 500075 HCHG BLADE, CLIPPER NEURO: Performed by: NEUROLOGICAL SURGERY

## 2021-08-04 PROCEDURE — A9270 NON-COVERED ITEM OR SERVICE: HCPCS | Performed by: STUDENT IN AN ORGANIZED HEALTH CARE EDUCATION/TRAINING PROGRAM

## 2021-08-04 PROCEDURE — C1713 ANCHOR/SCREW BN/BN,TIS/BN: HCPCS | Performed by: NEUROLOGICAL SURGERY

## 2021-08-04 PROCEDURE — 700102 HCHG RX REV CODE 250 W/ 637 OVERRIDE(OP): Performed by: STUDENT IN AN ORGANIZED HEALTH CARE EDUCATION/TRAINING PROGRAM

## 2021-08-04 PROCEDURE — 700111 HCHG RX REV CODE 636 W/ 250 OVERRIDE (IP): Performed by: ANESTHESIOLOGY

## 2021-08-04 PROCEDURE — 160048 HCHG OR STATISTICAL LEVEL 1-5: Performed by: NEUROLOGICAL SURGERY

## 2021-08-04 PROCEDURE — 700101 HCHG RX REV CODE 250: Performed by: ANESTHESIOLOGY

## 2021-08-04 PROCEDURE — 500257: Performed by: NEUROLOGICAL SURGERY

## 2021-08-04 PROCEDURE — 82962 GLUCOSE BLOOD TEST: CPT

## 2021-08-04 PROCEDURE — 160035 HCHG PACU - 1ST 60 MINS PHASE I: Performed by: NEUROLOGICAL SURGERY

## 2021-08-04 PROCEDURE — 700102 HCHG RX REV CODE 250 W/ 637 OVERRIDE(OP): Performed by: EMERGENCY MEDICINE

## 2021-08-04 PROCEDURE — 160029 HCHG SURGERY MINUTES - 1ST 30 MINS LEVEL 4: Performed by: NEUROLOGICAL SURGERY

## 2021-08-04 PROCEDURE — 160009 HCHG ANES TIME/MIN: Performed by: NEUROLOGICAL SURGERY

## 2021-08-04 PROCEDURE — 700102 HCHG RX REV CODE 250 W/ 637 OVERRIDE(OP): Performed by: INTERNAL MEDICINE

## 2021-08-04 PROCEDURE — 500367 HCHG DRAIN KIT, HEMOVAC: Performed by: NEUROLOGICAL SURGERY

## 2021-08-04 PROCEDURE — 700102 HCHG RX REV CODE 250 W/ 637 OVERRIDE(OP): Performed by: PHYSICIAN ASSISTANT

## 2021-08-04 PROCEDURE — 700111 HCHG RX REV CODE 636 W/ 250 OVERRIDE (IP): Performed by: NEUROLOGICAL SURGERY

## 2021-08-04 PROCEDURE — 110454 HCHG SHELL REV 250: Performed by: NEUROLOGICAL SURGERY

## 2021-08-04 PROCEDURE — A9270 NON-COVERED ITEM OR SERVICE: HCPCS | Performed by: ANESTHESIOLOGY

## 2021-08-04 PROCEDURE — 99291 CRITICAL CARE FIRST HOUR: CPT | Mod: GC | Performed by: EMERGENCY MEDICINE

## 2021-08-04 PROCEDURE — 700105 HCHG RX REV CODE 258: Performed by: PHYSICIAN ASSISTANT

## 2021-08-04 PROCEDURE — 770022 HCHG ROOM/CARE - ICU (200)

## 2021-08-04 PROCEDURE — A9270 NON-COVERED ITEM OR SERVICE: HCPCS | Performed by: EMERGENCY MEDICINE

## 2021-08-04 PROCEDURE — 501838 HCHG SUTURE GENERAL: Performed by: NEUROLOGICAL SURGERY

## 2021-08-04 PROCEDURE — 93005 ELECTROCARDIOGRAM TRACING: CPT | Performed by: STUDENT IN AN ORGANIZED HEALTH CARE EDUCATION/TRAINING PROGRAM

## 2021-08-04 PROCEDURE — A9270 NON-COVERED ITEM OR SERVICE: HCPCS | Performed by: INTERNAL MEDICINE

## 2021-08-04 PROCEDURE — 160002 HCHG RECOVERY MINUTES (STAT): Performed by: NEUROLOGICAL SURGERY

## 2021-08-04 PROCEDURE — 500889 HCHG PACK, NEURO: Performed by: NEUROLOGICAL SURGERY

## 2021-08-04 PROCEDURE — 160041 HCHG SURGERY MINUTES - EA ADDL 1 MIN LEVEL 4: Performed by: NEUROLOGICAL SURGERY

## 2021-08-04 PROCEDURE — 70450 CT HEAD/BRAIN W/O DYE: CPT | Mod: MG

## 2021-08-04 DEVICE — SCREW STRYK NC 1.5X5MM (6NCX40=240) (80EA/PK) CONSIGNED QTY 240 PRE-LOAD: Type: IMPLANTABLE DEVICE | Status: FUNCTIONAL

## 2021-08-04 DEVICE — PLATE NC BURR HOLE COVER FOR SHUNT 14MM (6NCX6=36): Type: IMPLANTABLE DEVICE | Status: FUNCTIONAL

## 2021-08-04 DEVICE — GRAFT DURAMATRIX ONLAY PLUS 3IN X 3IN OR 7.5CM X 7.5CM: Type: IMPLANTABLE DEVICE | Status: FUNCTIONAL

## 2021-08-04 DEVICE — SCREW STRYK NC 1.5X4MM (6NCX40=240) CONSIGNED QTY 240 PRE-LOAD 80/PK: Type: IMPLANTABLE DEVICE | Status: FUNCTIONAL

## 2021-08-04 RX ORDER — DEXAMETHASONE SODIUM PHOSPHATE 4 MG/ML
4 INJECTION, SOLUTION INTRA-ARTICULAR; INTRALESIONAL; INTRAMUSCULAR; INTRAVENOUS; SOFT TISSUE
Status: COMPLETED | OUTPATIENT
Start: 2021-08-04 | End: 2021-08-04

## 2021-08-04 RX ORDER — LABETALOL HYDROCHLORIDE 5 MG/ML
5 INJECTION, SOLUTION INTRAVENOUS
Status: DISCONTINUED | OUTPATIENT
Start: 2021-08-04 | End: 2021-08-04 | Stop reason: HOSPADM

## 2021-08-04 RX ORDER — BISACODYL 10 MG
10 SUPPOSITORY, RECTAL RECTAL
Status: DISCONTINUED | OUTPATIENT
Start: 2021-08-04 | End: 2021-08-17 | Stop reason: HOSPADM

## 2021-08-04 RX ORDER — ENEMA 19; 7 G/133ML; G/133ML
1 ENEMA RECTAL
Status: DISCONTINUED | OUTPATIENT
Start: 2021-08-04 | End: 2021-08-17 | Stop reason: HOSPADM

## 2021-08-04 RX ORDER — DIPHENHYDRAMINE HYDROCHLORIDE 50 MG/ML
25 INJECTION INTRAMUSCULAR; INTRAVENOUS EVERY 6 HOURS PRN
Status: DISCONTINUED | OUTPATIENT
Start: 2021-08-04 | End: 2021-08-07

## 2021-08-04 RX ORDER — LIDOCAINE HYDROCHLORIDE 20 MG/ML
INJECTION, SOLUTION EPIDURAL; INFILTRATION; INTRACAUDAL; PERINEURAL PRN
Status: DISCONTINUED | OUTPATIENT
Start: 2021-08-04 | End: 2021-08-04 | Stop reason: SURG

## 2021-08-04 RX ORDER — DIPHENHYDRAMINE HCL 25 MG
25 TABLET ORAL EVERY 6 HOURS PRN
Status: DISCONTINUED | OUTPATIENT
Start: 2021-08-04 | End: 2021-08-07

## 2021-08-04 RX ORDER — BUPIVACAINE HYDROCHLORIDE AND EPINEPHRINE 5; 5 MG/ML; UG/ML
INJECTION, SOLUTION EPIDURAL; INTRACAUDAL; PERINEURAL
Status: DISCONTINUED | OUTPATIENT
Start: 2021-08-04 | End: 2021-08-04 | Stop reason: HOSPADM

## 2021-08-04 RX ORDER — HYDROMORPHONE HYDROCHLORIDE 1 MG/ML
0.4 INJECTION, SOLUTION INTRAMUSCULAR; INTRAVENOUS; SUBCUTANEOUS
Status: DISCONTINUED | OUTPATIENT
Start: 2021-08-04 | End: 2021-08-04 | Stop reason: HOSPADM

## 2021-08-04 RX ORDER — DIPHENHYDRAMINE HYDROCHLORIDE 50 MG/ML
12.5 INJECTION INTRAMUSCULAR; INTRAVENOUS
Status: DISCONTINUED | OUTPATIENT
Start: 2021-08-04 | End: 2021-08-04 | Stop reason: HOSPADM

## 2021-08-04 RX ORDER — ONDANSETRON 2 MG/ML
INJECTION INTRAMUSCULAR; INTRAVENOUS PRN
Status: DISCONTINUED | OUTPATIENT
Start: 2021-08-04 | End: 2021-08-04 | Stop reason: SURG

## 2021-08-04 RX ORDER — POLYETHYLENE GLYCOL 3350 17 G/17G
1 POWDER, FOR SOLUTION ORAL 2 TIMES DAILY PRN
Status: DISCONTINUED | OUTPATIENT
Start: 2021-08-04 | End: 2021-08-05

## 2021-08-04 RX ORDER — CEFAZOLIN SODIUM 1 G/3ML
INJECTION, POWDER, FOR SOLUTION INTRAMUSCULAR; INTRAVENOUS
Status: DISCONTINUED | OUTPATIENT
Start: 2021-08-04 | End: 2021-08-04 | Stop reason: HOSPADM

## 2021-08-04 RX ORDER — OXYCODONE HYDROCHLORIDE 5 MG/1
2.5 TABLET ORAL
Status: DISCONTINUED | OUTPATIENT
Start: 2021-08-04 | End: 2021-08-05

## 2021-08-04 RX ORDER — LABETALOL HYDROCHLORIDE 5 MG/ML
10 INJECTION, SOLUTION INTRAVENOUS
Status: DISCONTINUED | OUTPATIENT
Start: 2021-08-04 | End: 2021-08-17 | Stop reason: HOSPADM

## 2021-08-04 RX ORDER — LEVETIRACETAM 500 MG/5ML
INJECTION, SOLUTION, CONCENTRATE INTRAVENOUS PRN
Status: DISCONTINUED | OUTPATIENT
Start: 2021-08-04 | End: 2021-08-04 | Stop reason: SURG

## 2021-08-04 RX ORDER — POTASSIUM CHLORIDE 20 MEQ/1
40 TABLET, EXTENDED RELEASE ORAL EVERY 6 HOURS
Status: DISPENSED | OUTPATIENT
Start: 2021-08-04 | End: 2021-08-04

## 2021-08-04 RX ORDER — HYDRALAZINE HYDROCHLORIDE 20 MG/ML
5 INJECTION INTRAMUSCULAR; INTRAVENOUS
Status: DISCONTINUED | OUTPATIENT
Start: 2021-08-04 | End: 2021-08-04 | Stop reason: HOSPADM

## 2021-08-04 RX ORDER — HYDROMORPHONE HYDROCHLORIDE 1 MG/ML
0.2 INJECTION, SOLUTION INTRAMUSCULAR; INTRAVENOUS; SUBCUTANEOUS
Status: DISCONTINUED | OUTPATIENT
Start: 2021-08-04 | End: 2021-08-04 | Stop reason: HOSPADM

## 2021-08-04 RX ORDER — HYDRALAZINE HYDROCHLORIDE 20 MG/ML
10 INJECTION INTRAMUSCULAR; INTRAVENOUS
Status: DISCONTINUED | OUTPATIENT
Start: 2021-08-04 | End: 2021-08-17 | Stop reason: HOSPADM

## 2021-08-04 RX ORDER — CLONIDINE HYDROCHLORIDE 0.1 MG/1
0.1 TABLET ORAL EVERY 4 HOURS PRN
Status: DISCONTINUED | OUTPATIENT
Start: 2021-08-04 | End: 2021-08-07

## 2021-08-04 RX ORDER — PROCHLORPERAZINE EDISYLATE 5 MG/ML
10 INJECTION INTRAMUSCULAR; INTRAVENOUS ONCE
Status: COMPLETED | OUTPATIENT
Start: 2021-08-04 | End: 2021-08-04

## 2021-08-04 RX ORDER — HYDROMORPHONE HYDROCHLORIDE 1 MG/ML
0.25 INJECTION, SOLUTION INTRAMUSCULAR; INTRAVENOUS; SUBCUTANEOUS
Status: DISCONTINUED | OUTPATIENT
Start: 2021-08-04 | End: 2021-08-05

## 2021-08-04 RX ORDER — ONDANSETRON 2 MG/ML
4 INJECTION INTRAMUSCULAR; INTRAVENOUS
Status: COMPLETED | OUTPATIENT
Start: 2021-08-04 | End: 2021-08-04

## 2021-08-04 RX ORDER — ACETAMINOPHEN 500 MG
500 TABLET ORAL EVERY 6 HOURS PRN
Status: DISCONTINUED | OUTPATIENT
Start: 2021-08-04 | End: 2021-08-04

## 2021-08-04 RX ORDER — ACETAMINOPHEN 325 MG/1
650 TABLET ORAL EVERY 6 HOURS
Status: DISCONTINUED | OUTPATIENT
Start: 2021-08-05 | End: 2021-08-05

## 2021-08-04 RX ORDER — MAGNESIUM SULFATE HEPTAHYDRATE 40 MG/ML
2 INJECTION, SOLUTION INTRAVENOUS ONCE
Status: COMPLETED | OUTPATIENT
Start: 2021-08-04 | End: 2021-08-04

## 2021-08-04 RX ORDER — CEFAZOLIN SODIUM 1 G/3ML
INJECTION, POWDER, FOR SOLUTION INTRAMUSCULAR; INTRAVENOUS PRN
Status: DISCONTINUED | OUTPATIENT
Start: 2021-08-04 | End: 2021-08-04 | Stop reason: SURG

## 2021-08-04 RX ORDER — DIPHENHYDRAMINE HYDROCHLORIDE 50 MG/ML
25 INJECTION INTRAMUSCULAR; INTRAVENOUS EVERY 6 HOURS PRN
Status: DISCONTINUED | OUTPATIENT
Start: 2021-08-04 | End: 2021-08-17 | Stop reason: HOSPADM

## 2021-08-04 RX ORDER — CHOLECALCIFEROL (VITAMIN D3) 125 MCG
5 CAPSULE ORAL NIGHTLY
Status: DISCONTINUED | OUTPATIENT
Start: 2021-08-04 | End: 2021-08-07

## 2021-08-04 RX ORDER — SODIUM CHLORIDE, SODIUM LACTATE, POTASSIUM CHLORIDE, CALCIUM CHLORIDE 600; 310; 30; 20 MG/100ML; MG/100ML; MG/100ML; MG/100ML
INJECTION, SOLUTION INTRAVENOUS
Status: DISCONTINUED | OUTPATIENT
Start: 2021-08-04 | End: 2021-08-04 | Stop reason: SURG

## 2021-08-04 RX ORDER — AMOXICILLIN 250 MG
1 CAPSULE ORAL NIGHTLY
Status: DISCONTINUED | OUTPATIENT
Start: 2021-08-04 | End: 2021-08-07

## 2021-08-04 RX ORDER — SODIUM CHLORIDE, SODIUM LACTATE, POTASSIUM CHLORIDE, CALCIUM CHLORIDE 600; 310; 30; 20 MG/100ML; MG/100ML; MG/100ML; MG/100ML
INJECTION, SOLUTION INTRAVENOUS CONTINUOUS
Status: DISCONTINUED | OUTPATIENT
Start: 2021-08-04 | End: 2021-08-04 | Stop reason: HOSPADM

## 2021-08-04 RX ORDER — FAMOTIDINE 20 MG/1
20 TABLET, FILM COATED ORAL 2 TIMES DAILY
Status: DISCONTINUED | OUTPATIENT
Start: 2021-08-04 | End: 2021-08-05

## 2021-08-04 RX ORDER — PHENYLEPHRINE HYDROCHLORIDE 10 MG/ML
INJECTION, SOLUTION INTRAMUSCULAR; INTRAVENOUS; SUBCUTANEOUS PRN
Status: DISCONTINUED | OUTPATIENT
Start: 2021-08-04 | End: 2021-08-04 | Stop reason: SURG

## 2021-08-04 RX ORDER — DEXAMETHASONE SODIUM PHOSPHATE 4 MG/ML
INJECTION, SOLUTION INTRA-ARTICULAR; INTRALESIONAL; INTRAMUSCULAR; INTRAVENOUS; SOFT TISSUE PRN
Status: DISCONTINUED | OUTPATIENT
Start: 2021-08-04 | End: 2021-08-04 | Stop reason: SURG

## 2021-08-04 RX ORDER — SODIUM CHLORIDE 9 MG/ML
INJECTION, SOLUTION INTRAVENOUS CONTINUOUS
Status: DISCONTINUED | OUTPATIENT
Start: 2021-08-04 | End: 2021-08-05

## 2021-08-04 RX ORDER — ONDANSETRON 2 MG/ML
4 INJECTION INTRAMUSCULAR; INTRAVENOUS EVERY 4 HOURS PRN
Status: DISCONTINUED | OUTPATIENT
Start: 2021-08-04 | End: 2021-08-17 | Stop reason: HOSPADM

## 2021-08-04 RX ORDER — OXYCODONE HCL 5 MG/5 ML
10 SOLUTION, ORAL ORAL
Status: COMPLETED | OUTPATIENT
Start: 2021-08-04 | End: 2021-08-04

## 2021-08-04 RX ORDER — OXYCODONE HCL 5 MG/5 ML
5 SOLUTION, ORAL ORAL
Status: COMPLETED | OUTPATIENT
Start: 2021-08-04 | End: 2021-08-04

## 2021-08-04 RX ORDER — DOCUSATE SODIUM 100 MG/1
100 CAPSULE, LIQUID FILLED ORAL 2 TIMES DAILY
Status: DISCONTINUED | OUTPATIENT
Start: 2021-08-04 | End: 2021-08-07

## 2021-08-04 RX ORDER — HYDROMORPHONE HYDROCHLORIDE 1 MG/ML
0.1 INJECTION, SOLUTION INTRAMUSCULAR; INTRAVENOUS; SUBCUTANEOUS
Status: DISCONTINUED | OUTPATIENT
Start: 2021-08-04 | End: 2021-08-04 | Stop reason: HOSPADM

## 2021-08-04 RX ORDER — CEFAZOLIN SODIUM 2 G/100ML
2 INJECTION, SOLUTION INTRAVENOUS EVERY 8 HOURS
Status: COMPLETED | OUTPATIENT
Start: 2021-08-05 | End: 2021-08-05

## 2021-08-04 RX ORDER — AMOXICILLIN 250 MG
1 CAPSULE ORAL
Status: DISCONTINUED | OUTPATIENT
Start: 2021-08-04 | End: 2021-08-07

## 2021-08-04 RX ORDER — OXYCODONE HYDROCHLORIDE 5 MG/1
2.5 TABLET ORAL EVERY 4 HOURS PRN
Status: DISCONTINUED | OUTPATIENT
Start: 2021-08-04 | End: 2021-08-04

## 2021-08-04 RX ORDER — LEVETIRACETAM 5 MG/ML
500 INJECTION INTRAVASCULAR EVERY 12 HOURS
Status: DISCONTINUED | OUTPATIENT
Start: 2021-08-04 | End: 2021-08-05

## 2021-08-04 RX ORDER — OXYCODONE HYDROCHLORIDE 5 MG/1
5 TABLET ORAL
Status: DISCONTINUED | OUTPATIENT
Start: 2021-08-04 | End: 2021-08-05

## 2021-08-04 RX ORDER — ACETAMINOPHEN 325 MG/1
650 TABLET ORAL EVERY 6 HOURS PRN
Status: DISCONTINUED | OUTPATIENT
Start: 2021-08-10 | End: 2021-08-05

## 2021-08-04 RX ADMIN — LEVOTHYROXINE SODIUM 137 MCG: 0.03 TABLET ORAL at 05:39

## 2021-08-04 RX ADMIN — FAMOTIDINE 20 MG: 10 INJECTION INTRAVENOUS at 21:38

## 2021-08-04 RX ADMIN — OXYCODONE HYDROCHLORIDE 10 MG: 5 SOLUTION ORAL at 20:00

## 2021-08-04 RX ADMIN — MAGNESIUM SULFATE 2 G: 2 INJECTION INTRAVENOUS at 11:46

## 2021-08-04 RX ADMIN — SODIUM CHLORIDE: 9 INJECTION, SOLUTION INTRAVENOUS at 21:37

## 2021-08-04 RX ADMIN — DEXAMETHASONE SODIUM PHOSPHATE 8 MG: 4 INJECTION, SOLUTION INTRA-ARTICULAR; INTRALESIONAL; INTRAMUSCULAR; INTRAVENOUS; SOFT TISSUE at 18:08

## 2021-08-04 RX ADMIN — ROCURONIUM BROMIDE 50 MG: 10 INJECTION, SOLUTION INTRAVENOUS at 17:49

## 2021-08-04 RX ADMIN — TACROLIMUS 4 MG: 1 CAPSULE ORAL at 05:41

## 2021-08-04 RX ADMIN — AMBRISENTAN 10 MG: 10 TABLET, FILM COATED ORAL at 06:00

## 2021-08-04 RX ADMIN — FLUDROCORTISONE ACETATE 0.5 MG: 0.1 TABLET ORAL at 05:40

## 2021-08-04 RX ADMIN — OXYCODONE 2.5 MG: 5 TABLET ORAL at 06:13

## 2021-08-04 RX ADMIN — PHENYLEPHRINE HYDROCHLORIDE 100 MCG: 10 INJECTION INTRAVENOUS at 19:22

## 2021-08-04 RX ADMIN — ONDANSETRON 4 MG: 2 INJECTION INTRAMUSCULAR; INTRAVENOUS at 20:00

## 2021-08-04 RX ADMIN — OXYCODONE 2.5 MG: 5 TABLET ORAL at 02:13

## 2021-08-04 RX ADMIN — OXYCODONE 5 MG: 5 TABLET ORAL at 21:33

## 2021-08-04 RX ADMIN — DEXAMETHASONE SODIUM PHOSPHATE 4 MG: 4 INJECTION, SOLUTION INTRA-ARTICULAR; INTRALESIONAL; INTRAMUSCULAR; INTRAVENOUS; SOFT TISSUE at 21:53

## 2021-08-04 RX ADMIN — POTASSIUM CHLORIDE 40 MEQ: 1500 TABLET, EXTENDED RELEASE ORAL at 11:45

## 2021-08-04 RX ADMIN — SODIUM CHLORIDE, POTASSIUM CHLORIDE, SODIUM LACTATE AND CALCIUM CHLORIDE: 600; 310; 30; 20 INJECTION, SOLUTION INTRAVENOUS at 17:42

## 2021-08-04 RX ADMIN — PHENYLEPHRINE HYDROCHLORIDE 100 MCG: 10 INJECTION INTRAVENOUS at 19:01

## 2021-08-04 RX ADMIN — OMEPRAZOLE 20 MG: 20 CAPSULE, DELAYED RELEASE ORAL at 05:39

## 2021-08-04 RX ADMIN — GABAPENTIN 100 MG: 100 CAPSULE ORAL at 05:39

## 2021-08-04 RX ADMIN — CEFAZOLIN 2 G: 330 INJECTION, POWDER, FOR SOLUTION INTRAMUSCULAR; INTRAVENOUS at 17:50

## 2021-08-04 RX ADMIN — TADALAFIL 20 MG: 20 TABLET ORAL at 22:50

## 2021-08-04 RX ADMIN — ONDANSETRON 4 MG: 2 INJECTION INTRAMUSCULAR; INTRAVENOUS at 19:29

## 2021-08-04 RX ADMIN — FENTANYL CITRATE 50 MCG: 50 INJECTION, SOLUTION INTRAMUSCULAR; INTRAVENOUS at 17:49

## 2021-08-04 RX ADMIN — FENTANYL CITRATE 25 MCG: 50 INJECTION INTRAMUSCULAR; INTRAVENOUS at 20:22

## 2021-08-04 RX ADMIN — PHENYLEPHRINE HYDROCHLORIDE 100 MCG: 10 INJECTION INTRAVENOUS at 18:42

## 2021-08-04 RX ADMIN — FENTANYL CITRATE 50 MCG: 50 INJECTION, SOLUTION INTRAMUSCULAR; INTRAVENOUS at 19:36

## 2021-08-04 RX ADMIN — LEVETIRACETAM 500 MG: 100 INJECTION, SOLUTION INTRAVENOUS at 18:01

## 2021-08-04 RX ADMIN — MYCOPHENOLATE MOFETIL 250 MG: 250 CAPSULE ORAL at 05:38

## 2021-08-04 RX ADMIN — LEVETIRACETAM INJECTION 500 MG: 5 INJECTION INTRAVENOUS at 20:36

## 2021-08-04 RX ADMIN — ALPRAZOLAM 0.5 MG: 0.5 TABLET ORAL at 06:36

## 2021-08-04 RX ADMIN — Medication 5 MG: at 02:13

## 2021-08-04 RX ADMIN — PROCHLORPERAZINE EDISYLATE 10 MG: 5 INJECTION INTRAMUSCULAR; INTRAVENOUS at 22:26

## 2021-08-04 RX ADMIN — PROPOFOL 120 MG: 10 INJECTION, EMULSION INTRAVENOUS at 17:49

## 2021-08-04 RX ADMIN — FENTANYL CITRATE 50 MCG: 50 INJECTION, SOLUTION INTRAMUSCULAR; INTRAVENOUS at 18:13

## 2021-08-04 RX ADMIN — SERTRALINE HYDROCHLORIDE 100 MG: 100 TABLET ORAL at 05:39

## 2021-08-04 RX ADMIN — LIDOCAINE HYDROCHLORIDE 50 MG: 20 INJECTION, SOLUTION EPIDURAL; INFILTRATION; INTRACAUDAL at 17:49

## 2021-08-04 RX ADMIN — LEVETIRACETAM 500 MG: 500 TABLET ORAL at 05:39

## 2021-08-04 RX ADMIN — DOCUSATE SODIUM 100 MG: 100 CAPSULE, LIQUID FILLED ORAL at 21:42

## 2021-08-04 RX ADMIN — SUGAMMADEX 200 MG: 100 INJECTION, SOLUTION INTRAVENOUS at 19:35

## 2021-08-04 RX ADMIN — PHENYLEPHRINE HYDROCHLORIDE 100 MCG: 10 INJECTION INTRAVENOUS at 19:28

## 2021-08-04 RX ADMIN — ONDANSETRON 4 MG: 2 INJECTION INTRAMUSCULAR; INTRAVENOUS at 21:33

## 2021-08-04 RX ADMIN — PHENYLEPHRINE HYDROCHLORIDE 100 MCG: 10 INJECTION INTRAVENOUS at 17:55

## 2021-08-04 RX ADMIN — PRAVASTATIN SODIUM 20 MG: 20 TABLET ORAL at 05:39

## 2021-08-04 ASSESSMENT — ENCOUNTER SYMPTOMS
STRIDOR: 0
BACK PAIN: 0
NAUSEA: 0
CONSTIPATION: 0
PALPITATIONS: 0
DEPRESSION: 0
DOUBLE VISION: 0
BLURRED VISION: 0
MYALGIAS: 0
DIZZINESS: 0
FOCAL WEAKNESS: 0
DIARRHEA: 0
CHILLS: 0
FEVER: 0
HEADACHES: 1
BRUISES/BLEEDS EASILY: 0
SHORTNESS OF BREATH: 0
ABDOMINAL PAIN: 0
VOMITING: 0
COUGH: 0

## 2021-08-04 ASSESSMENT — PAIN SCALES - GENERAL: PAIN_LEVEL: 3

## 2021-08-04 NOTE — PROGRESS NOTES
Neurosurgery Progress Note    Subjective:  S/p fall with left SDH.  Reports significant HA this AM.  MRI completed.  Labs look fine.    Has h/o liver transplant    Exam:  A&O x3  Speech fluent and clear.   CN 2-12 grossly intact.  EOMI, PERRL.  Slight right drift.  Moves extremities x4.    BP  Min: 96/56  Max: 142/66  Pulse  Av.3  Min: 63  Max: 94  Resp  Avg: 15.2  Min: 12  Max: 31  Temp  Av.9 °C (96.7 °F)  Min: 35.7 °C (96.3 °F)  Max: 36.1 °C (96.9 °F)  SpO2  Av.8 %  Min: 81 %  Max: 99 %    No data recorded    Recent Labs     21  0656 21  0445   WBC 3.4* 3.7*   RBC 4.11* 3.37*   HEMOGLOBIN 12.5 10.2*   HEMATOCRIT 38.7 31.9*   MCV 94.2 94.7   MCH 30.4 30.3   MCHC 32.3* 32.0*   RDW 60.7* 60.1*   PLATELETCT 174 146*   MPV 9.2 9.5     Recent Labs     21  0656 21  0445   SODIUM 148* 140   POTASSIUM 2.8* 3.0*   CHLORIDE 109 108   CO2 25 26   GLUCOSE 82 109*   BUN 10 12   CREATININE 0.51 0.59   CALCIUM 8.5 7.6*     Recent Labs     21  1144   INR 1.13     Recent Labs     21  1117   REACTMIN 4.2*   CLOTKINET 1.3   CLOTANGL 73.7   MAXCLOTS 54.0   PRCINADP 14.4   PRCINAA 41.3*       Intake/Output                       21 - 21 0659 21 - 21 0659      Total  Total                 Intake    P.O.  220  360 580  --  -- --    P.O. 220 360 580 -- -- --    Other  --  60 60  --  -- --    Medications (PO/Enteral Liquids) -- 60 60 -- -- --    IV Piggyback  100  -- 100  --  -- --    Volume (mL) (potassium chloride (KCL) ivpb 10 mEq) 100 -- 100 -- -- --    Total Intake 320 420 740 -- -- --       Output    Urine  --  280 280  --  -- --    Number of Times Voided 1 x 4 x 5 x -- -- --    Urine Void (mL) -- 280 280 -- -- --    Stool  --  -- --  --  -- --    Number of Times Stooled 1 x -- 1 x -- -- --    Total Output -- 280 280 -- -- --       Net I/O     320 140 460 -- -- --            Intake/Output Summary (Last 24 hours)  at 8/4/2021 0817  Last data filed at 8/4/2021 0600  Gross per 24 hour   Intake 740 ml   Output 280 ml   Net 460 ml            • melatonin  5 mg Nightly   • acetaminophen  500 mg Q6HRS PRN   • oxyCODONE immediate-release  2.5 mg Q4HRS PRN   • MD Alert...Adult ICU Electrolyte Replacement per Pharmacy   PHARMACY TO DOSE   • potassium chloride SA  40 mEq Q6HR   • magnesium sulfate  2 g Once   • senna-docusate  2 tablet BID    And   • polyethylene glycol/lytes  1 Packet QDAY PRN    And   • magnesium hydroxide  30 mL QDAY PRN    And   • bisacodyl  10 mg QDAY PRN   • ALPRAZolam  0.5 mg TID PRN   • fludrocortisone  0.5 mg DAILY   • gabapentin  100 mg BID   • levothyroxine  137 mcg AM ES   • pravastatin  20 mg DAILY   • sertraline  100 mg DAILY   • mycophenolate  250 mg BID   • omeprazole  20 mg DAILY   • levETIRAcetam  500 mg BID   • tacrolimus  4 mg DAILY   • ambrisentan  10 mg DAILY   • tadalafil  20 mg QHS       Assessment and Plan:  Hospital day #2  POD #0  Planning for left craniotomy for evacuation of SDH today.  NPO currently.  Continue medical care.   Prophylactic anticoagulation: no         Start date/time: TBD

## 2021-08-04 NOTE — PROGRESS NOTES
"[facility] ICU Progress Note      Admit Date: 8/3/2021    Resident(s): Venancio Velasquez M.D.   Attending:  Dr. Delacruz      Previous 24 Hours:  Repeat CTH showed stable appearing L frontal SDH w/ 7.5 mm shift.      Hospital Course (carried forward and updated):  Hospital Day: 1    Roxana Cuba is a 61 y.o. female with complex PMHx significant for COPD, chronic hypoxemic respiratory failure on 3L NC, pulmonary HTN, liver transplantation in 2011 for etoh cirrhosis, granulomatous hepatitis/sarcoid, Evon-en-Y gastric bypass 2018, MGUS, CKD, ?adrenal insufficiency, hypothyroidism who presented 8/3/2021 with worsening HA and visual changes x4 weeks after being diagnosed with a SDH secondary to ground level, mechanical fall 5 weeks ago while she was in [facility]. Patient also had laparotomy for \"twisting of the bowel\" in [facility] 5 weeks ago. No prior imaging available, but MRI done here reveals 29 mm subacute/chronic L SDH and SAH adjacent to L frontal/temporal lobes with 6 mm L->R shift. NSG was consulted and plan to perform L frontal craniotomy. Patient admitted to ICU in anticipation of post-op for monitoring.      DISPO: Per NSG, plan for L craniotomy today.      # Neuro  Nonfocal neuro exam.  - L SDH: Secondary to GLF 5 weeks ago. MRI 8/3: 29 mm L SDH w/ 6 mm L->R shift. q2h neurochecks, levetiracetam for seizure ppx, hold dvt ppx, Repeat CT 8/4 stable. NPO for NSG.  - Analgesia: hx chronic opioid use: patient reports using 30 mg oxycodone daily for back pain. Oxy prn. Acetaminophen prn 2g daily max.  - Mood d/o: cont home sertraline and alprazolam prn  - sleep: melatonin    # CV  No acute problems. SR. MAP 75.  - DLD: continue home statin.    # Pulm  No acute problems.  - Chronic hypoxemia: On 4.5 L o2. CXR showed L basilar opacity likely atelectasis. IS.   - Pulmonary HTN: Not in acute RHF. Echo pending. Continue home ambrisentan and tadalafil.    # GI  No acute problems.  - Liver transplantation in 2011 for etoh " cirrhosis: Continue home tacrolimus and MMF  - GERD: cont home ppi    # RENAL and F/E/N  No acute problems.  - Hypokalemia: replace  - ?CKD: cr 0.58 at baseline.    # Heme  - Anemia: normocytic    # Endo  No acute problems.  - Adrenal insufficiency: cont home fludrocortisone  - hypothyroidism: continue home levothyroxine    # ID  No acute problems.      # ICU Checklist  -DVT ppx: SCDs  -GI ppx: omeprazole  -Nutrition: NPO pending procedure  -Lines/drains: piv      Review of Systems   Constitutional: Negative for chills and fever.   HENT: Negative for congestion and hearing loss.    Eyes: Negative for blurred vision and double vision.   Respiratory: Negative for cough, shortness of breath and stridor.    Cardiovascular: Negative for chest pain and palpitations.   Gastrointestinal: Negative for abdominal pain, constipation, diarrhea, nausea and vomiting.   Genitourinary: Negative for dysuria and urgency.   Musculoskeletal: Negative for back pain and myalgias.   Skin: Negative for itching and rash.   Neurological: Positive for headaches. Negative for dizziness and focal weakness.   Endo/Heme/Allergies: Negative for environmental allergies. Does not bruise/bleed easily.   Psychiatric/Behavioral: Negative for depression and suicidal ideas.          Vitals:  Temp:  [35.7 °C (96.3 °F)-36.1 °C (96.9 °F)] 35.7 °C (96.3 °F)  Pulse:  [63-94] 65  Resp:  [12-31] 13  BP: ()/(49-94) 115/58  SpO2:  [81 %-99 %] 99 %      I/O's:    Intake/Output Summary (Last 24 hours) at 8/4/2021 0613  Last data filed at 8/4/2021 0600  Gross per 24 hour   Intake 740 ml   Output 280 ml   Net 460 ml       O2 therapy: Pulse Oximetry: 99 %, O2 (LPM): 1, O2 Delivery Device: Silicone Nasal Cannula    Physical Exam:  Physical Exam  Constitutional:       General: She is not in acute distress.  HENT:      Head: Normocephalic.      Comments: Ecchymosis on right face. Patient said 2/2 fall 5 weeks ago.     Right Ear: External ear normal.      Left Ear:  External ear normal.      Nose: Nose normal.      Mouth/Throat:      Mouth: Mucous membranes are moist.      Pharynx: Oropharynx is clear.   Eyes:      General: No scleral icterus.     Conjunctiva/sclera: Conjunctivae normal.   Cardiovascular:      Rate and Rhythm: Normal rate and regular rhythm.      Heart sounds: Normal heart sounds.   Pulmonary:      Effort: Pulmonary effort is normal. No respiratory distress.      Breath sounds: Normal breath sounds. No stridor. No wheezing, rhonchi or rales.   Abdominal:      Palpations: Abdomen is soft.      Tenderness: There is no abdominal tenderness.   Musculoskeletal:      Cervical back: Neck supple.      Right lower leg: No edema.      Left lower leg: No edema.      Comments: Ecchymosis on b/l arms from fall 5 weeks ago.   Skin:     General: Skin is warm and dry.   Neurological:      Mental Status: She is alert and oriented to person, place, and time.      Comments: Normal speech. Followed directions. No obvious facial droop. PERRL. Right foot weak dorsiflexion. Other 3 extremities strength intact. Sensation to light touch intact in all four equally.   Psychiatric:         Mood and Affect: Mood normal.         Behavior: Behavior normal.           Labs:      Recent Labs     08/03/21 0656 08/04/21 0445   SODIUM 148* 140   POTASSIUM 2.8* 3.0*   CHLORIDE 109 108   CO2 25 26   BUN 10 12   CREATININE 0.51 0.59   MAGNESIUM  --  1.6   CALCIUM 8.5 7.6*     Recent Labs     08/03/21 0656 08/04/21 0445   ALTSGPT 19 10   ASTSGOT 40 24   ALKPHOSPHAT 84 67   TBILIRUBIN 0.3 0.3   GLUCOSE 82 109*     Recent Labs     08/03/21 0656 08/03/21  1144 08/04/21 0445   RBC 4.11*  --  3.37*   HEMOGLOBIN 12.5  --  10.2*   HEMATOCRIT 38.7  --  31.9*   PLATELETCT 174  --  146*   PROTHROMBTM  --  14.2  --    INR  --  1.13  --      Recent Labs     08/03/21 0656 08/04/21 0445   WBC 3.4* 3.7*   NEUTSPOLYS 50.30  --    LYMPHOCYTES 34.50  --    MONOCYTES 8.60  --    EOSINOPHILS 4.80  --    BASOPHILS  0.90  --    ASTSGOT 40 24   ALTSGPT 19 10   ALKPHOSPHAT 84 67   TBILIRUBIN 0.3 0.3         Imaging:  I have reviewed the relevant diagnostic imaging.      Microbiology:  I have reviewed the relevant microbiology.      ASSESSEMENT and PLAN:    * Traumatic subdural hematoma without loss of consciousness (HCC)  Assessment & Plan  Status post fall approximately 5 weeks ago in [facility] with 2-day admit and diagnosis of subdural hematoma  Headache never resolved and is worse today associated with some vision changes which prompted the ER visit    CT scan head:23 mm thick mixed density left frontotemporal convexity subdural hematoma with mass effect including 8 mm rightward midline shift/subfalcine herniation.  Consult neurosurgery  Admit to ICU  Serial neuro checks  TEG with mapping grossly normal  Follow-up CT head for neuro changes or in a.m.  Okay to feed per neurosurgery, no surgery planned for today or tonight  Sequentials, no heparins  Goal systolic blood pressure less than equal to 160  Seizure precautions and Keppra seizure prophylaxis per NS    History of pneumonia  Assessment & Plan  Hx of PNA approximately 2 years ago in [place]  Grounded from air travel secondary to hypoxemia and then pandemic hip [facility] and she has been in [place] until 2 days ago  Respiratory symptoms have resolved and she is at baseline  Covid test pending, sent from ER    Hypernatremia  Assessment & Plan  Query component of dehydration?  We will not correct quickly with fluids given her subdural hematoma  Serial BMP    Hypokalemia  Assessment & Plan  Replete    Pulmonary hypertension (HCC)- (present on admission)  Assessment & Plan  History of pulmonary hypertension:  Initial evaluation at [facility] pre liver transplant, PFO closed w/o complication, 1/25/2011, then worse PAH and R HF post transplant, extensive evaluations at [facility] in  with cath and drug trial, responding to tadalafil and ambrisentan dramatically. Followed there with serial R  heart cath.  3/25/2014: Most recent R heart cath done Dr. Brenda Flores with mild pulmonary hypertension and relatively high ouput  November 2014: Echocardiogram with normal LV size, LVEF 60-65%. Mild MR, mild AI, mild TR, RVSP 29-34mmHg.  February 2015: Echocardiogram with mild concentric LVH, LVEF 65-70%. Mild MR, mild AI, trace TR, RVSP 16mmHg.    Clinically not in right heart failure  BNP mildly elevated at 857  Repeat echocardiogram w/Doppler  Off medications per patient      Chronic respiratory failure with hypoxia (HCC)- (present on admission)  Assessment & Plan  Patient chronically on oxygen query related to complications of transplant and pulmonary pretension, patient not clear about why  Patient on 5 L nasal cannula O2 when she lived here 2 years ago, on room air after pneumonia in [place] cleared  Currently on 2 L O2 with excellent saturations  RT protocols  Obtain chest x-ray  Incentive spirometry    Status post liver transplant Dr. Canada ([facility])- (present on admission)  Assessment & Plan  -December 2011: Status post liver transplant for end stage liver disease at [facility], followed by Dr. Canada.  Patient does not know her own medications  Working with pharmacy to get med rec from daughter who speaks English and Japanese, medications apparently all in Japanese          Meds:  • melatonin  5 mg     • acetaminophen  500 mg     • oxyCODONE immediate-release  2.5 mg     • senna-docusate  2 tablet      And   • polyethylene glycol/lytes  1 Packet      And   • magnesium hydroxide  30 mL      And   • bisacodyl  10 mg     • ALPRAZolam  0.5 mg     • fludrocortisone  0.5 mg     • gabapentin  100 mg     • levothyroxine  137 mcg     • pravastatin  20 mg     • sertraline  100 mg     • mycophenolate  250 mg     • omeprazole  20 mg     • levETIRAcetam  500 mg     • tacrolimus  4 mg     • ambrisentan  10 mg     • tadalafil  20 mg          Current Outpatient Medications   Medication Instructions   • acetaminophen  (TYLENOL) 500 mg, Oral, EVERY 6 HOURS PRN   • ALPRAZolam (XANAX) 1 mg, Oral, 3 TIMES DAILY PRN   • ambrisentan (LETAIRIS) 10 mg, Oral, DAILY   • aspirin EC (ECOTRIN) 81 mg, Oral, DAILY   • [facility] ESOMEPRAZOLE MAGNESIUM 40 MG PO CPDR 40 mg, Oral, DAILY   • Calcium Carbonate (CALCIUM 500 PO) 1 tablet, Oral, DAILY   • fludrocortisone (FLORINEF) 0.5 mg, Oral, DAILY   • furosemide (LASIX) 20 mg, Oral, DAILY   • gabapentin (NEURONTIN) 100 mg, Oral, 2 TIMES DAILY   • Hyoscyamine Sulfate (HYOSCYAMINE PO) 0.2 mg, Oral, PRN   • levETIRAcetam (KEPPRA) 500 mg, Oral, 2 TIMES DAILY   • levothyroxine (SYNTHROID) 137 mcg, Oral, EACH MORNING ON EMPTY STOMACH   • meclizine (ANTIVERT) 25 mg, Oral, PRN   • metoclopramide (REGLAN) 10 mg, Oral, PRN   • Movantik 25 mg, Oral, DAILY   • mycophenolate (CELLCEPT) 250 mg, Oral, 2 TIMES DAILY   • pravastatin (PRAVACHOL) 20 mg, Oral, DAILY   • Prucalopride Succinate 2 mg, Oral, DAILY   • sertraline (ZOLOFT) 100 mg, Oral, DAILY   • tacrolimus (PROGRAF) 4 mg, Oral, DAILY, Patient take 1mg + 3mg = 4mg    • tadalafil (CIALIS) 20 mg, Oral, EVERY BEDTIME   • therapeutic multivitamin-minerals (THERAGRAN-M) Tab 1 tablet, Oral, DAILY           Venancio Velasquez M.D.

## 2021-08-04 NOTE — ANESTHESIA PREPROCEDURE EVALUATION
Relevant Problems   PULMONARY   (positive) History of Clostridium difficile infection   (positive) History of infection with vancomycin resistant Enterococcus (VRE)      NEURO   (positive) H/O non-ST elevation myocardial infarction (NSTEMI)   (positive) History of Clostridium difficile infection   (positive) History of aspiration pneumonia   (positive) History of infection with vancomycin resistant Enterococcus (VRE)   (positive) History of pancreatitis   (positive) History of pneumonia   (positive) History of small bowel obstruction      CARDIAC   (positive) Hepatopulmonary syndrome (HCC)   (positive) Mild aortic regurgitation and aortic valve sclerosis   (positive) Mild mitral regurgitation   (positive) Primary pulmonary hypertension (HCC)   (positive) Pulmonary hypertension (HCC)      GI   (positive) GERD (gastroesophageal reflux disease)   (positive) Hiatal hernia         (positive) CKD (chronic kidney disease) stage 3, GFR 30-59 ml/min- unclear cause, probably multifactorial   (positive) Hepatopulmonary syndrome (HCC)      ENDO   (positive) Acquired hypothyroidism      Other   (positive) Splenic lesion   (positive) Splenomegaly       Physical Exam    Airway   Mallampati: II  TM distance: >3 FB  Neck ROM: full       Cardiovascular - normal exam  Rhythm: regular  Rate: normal  (-) murmur     Dental - normal exam           Pulmonary - normal exam  Breath sounds clear to auscultation     Abdominal    Neurological - normal exam                 Anesthesia Plan    ASA 3- EMERGENT   ASA physical status 3 criteria: CAD/stents (> 3 months)ASA physical status emergent criteria: neurologic compromise requiring immediate intervention    Plan - general       Airway plan will be ETT          Induction: intravenous    Postoperative Plan: Postoperative administration of opioids is intended.    Pertinent diagnostic labs and testing reviewed    Informed Consent:    Anesthetic plan and risks discussed with patient.    Use of  blood products discussed with: patient whom consented to blood products.        liver transplant ; pul pressure 30.

## 2021-08-04 NOTE — CARE PLAN
The patient is Watcher - Medium risk of patient condition declining or worsening    Shift Goals  Clinical Goals: Stable neuro exams, stable VS  Patient Goals: Go home to family  Family Goals: Go home safely    Progress made toward(s) clinical / shift goals:  Stable Neuro checks Q2 hours. Headache improved with Tylenol.     Problem: Pain - Standard  Goal: Alleviation of pain or a reduction in pain to the patient’s comfort goal  8/3/2021 1753 by Gregoria Jain, R.N.  Outcome: Progressing  8/3/2021 1753 by Gregoria Jain, R.N.  Outcome: Progressing        Problem: Fall Risk  Goal: Patient will remain free from falls  Outcome: Progressing       Patient is not progressing towards the following goals:

## 2021-08-04 NOTE — CONSULTS
DATE OF SERVICE:  08/03/2021     INPATIENT CONSULTATION     CHIEF COMPLAINT:  Chronic subdural hematoma.     HISTORY OF PRESENT ILLNESS:  This is a 61-year-old right-handed woman with a   significant history of pulmonary hypertension 10 years status post liver   transplant.  She fell 5 weeks ago.  She denies aspirin, Plavix or Coumadin   use.  She apparently fell when visiting [facility], and they knew about a small subdural hematoma and that   they just watched it as she was doing OK.  She came in because of worsening headaches and a CAT scan   shows a 2.5 cm mixed density primarily a left frontal subdural hematoma with 6   mm midline shift was found.  Patient was admitted to the hospitalist, coags   are normal.  Platelets are normal.     PAST MEDICAL HISTORY:  Significant for that which is mentioned in the HPI plus   cardiomegaly, cirrhosis, chronic kidney disease, diabetes, GERD, high   cholesterol, hypothyroidism, hemorrhagic disorder, aortic valve sclerosis,   splenomegaly, VRE.     PAST SURGICAL HISTORY:  Include multiple surgeries, gastroscopy,   hemorrhoidectomy, sigmoidoscopy, lung and liver procedures, hernia repair,   etc.     SOCIAL HISTORY:  She is a nonsmoker, does not drink.     PHYSICAL EXAMINATION:  GENERAL:  She is awake, alert and oriented x3.  HEENT:  Pupils are symmetric.  Extraocular motion intact.  Face full.  Tongue   midline.  NEUROLOGIC:  She has no pronator drift.  Moves arms and legs symmetrically.    Good sensation of face, arms, legs with good naming and repetition.     ASSESSMENT AND PLAN:  The patient is a not the best surgical candidate status   post liver transplants and other medical issues including immunosuppression   but she needs a left frontal craniotomy for subdural hematoma.  She was   admitted for q.2 hour neuro checks, Keppra 500 b.i.d. can be adjusted for   liver dosing if needed.  I recommended the hospice just check anything else   that they feel would help us determine and  mitigate any intraoperative   bleeding risk.  She will come back to the ICU postop.  Explained the risks   include pain, infection, bleeding, CSF leak, failure to resolve symptoms,   neurologic deficit, weakness, numbness, bladder or bowel difficulties,  speech difficulties, need for recurrent surgeries due to the bleeding disorder   and increased risk of infection due to immunocompromised state as really no   alternative here.     Thanks for allowing me to participate in her care.        ______________________________  MD DONY Morataya III/RONAL    DD:  08/03/2021 17:37  DT:  08/03/2021 19:45    Job#:  [id number]

## 2021-08-04 NOTE — CARE PLAN
Problem: Psychosocial  Goal: Patient's level of anxiety will decrease  Outcome: Progressing  Note: Administered medication as ordered. Provided alternative coping mechanisms and discussed patient concerns.Hourly rounding in place.      Problem: Hemodynamics  Goal: Patient's hemodynamics, fluid balance and neurologic status will be stable or improve  Outcome: Progressing  Note: Stable neuro exam throughout shift. Q2 neuro checks in place.      Shift Goals  Clinical Goals: Stable Neuro Exam  Patient Goals: Pain/anxiety management  Family Goals: Visitation    Progress made toward(s) clinical / shift goals:  Yes

## 2021-08-05 ENCOUNTER — APPOINTMENT (OUTPATIENT)
Dept: RADIOLOGY | Facility: MEDICAL CENTER | Age: 61
DRG: 026 | End: 2021-08-05
Attending: PHYSICIAN ASSISTANT
Payer: MEDICARE

## 2021-08-05 ENCOUNTER — APPOINTMENT (OUTPATIENT)
Dept: CARDIOLOGY | Facility: MEDICAL CENTER | Age: 61
DRG: 026 | End: 2021-08-05
Attending: INTERNAL MEDICINE
Payer: MEDICARE

## 2021-08-05 LAB
ANION GAP SERPL CALC-SCNC: 8 MMOL/L (ref 7–16)
BUN SERPL-MCNC: 9 MG/DL (ref 8–22)
CALCIUM SERPL-MCNC: 7.8 MG/DL (ref 8.5–10.5)
CHLORIDE SERPL-SCNC: 110 MMOL/L (ref 96–112)
CO2 SERPL-SCNC: 25 MMOL/L (ref 20–33)
CREAT SERPL-MCNC: 0.55 MG/DL (ref 0.5–1.4)
EKG IMPRESSION: NORMAL
ERYTHROCYTE [DISTWIDTH] IN BLOOD BY AUTOMATED COUNT: 57.1 FL (ref 35.9–50)
GLUCOSE SERPL-MCNC: 173 MG/DL (ref 65–99)
HCT VFR BLD AUTO: 33.9 % (ref 37–47)
HGB BLD-MCNC: 11 G/DL (ref 12–16)
LV EJECT FRACT  99904: 65
LV EJECT FRACT MOD 2C 99903: 62.63
LV EJECT FRACT MOD 4C 99902: 73.63
LV EJECT FRACT MOD BP 99901: 67.85
MAGNESIUM SERPL-MCNC: 1.7 MG/DL (ref 1.5–2.5)
MCH RBC QN AUTO: 30.7 PG (ref 27–33)
MCHC RBC AUTO-ENTMCNC: 32.4 G/DL (ref 33.6–35)
MCV RBC AUTO: 94.7 FL (ref 81.4–97.8)
PHOSPHATE SERPL-MCNC: 3.8 MG/DL (ref 2.5–4.5)
PLATELET # BLD AUTO: 164 K/UL (ref 164–446)
PMV BLD AUTO: 8.9 FL (ref 9–12.9)
POTASSIUM SERPL-SCNC: 4 MMOL/L (ref 3.6–5.5)
RBC # BLD AUTO: 3.58 M/UL (ref 4.2–5.4)
SODIUM SERPL-SCNC: 143 MMOL/L (ref 135–145)
WBC # BLD AUTO: 6.3 K/UL (ref 4.8–10.8)

## 2021-08-05 PROCEDURE — 700102 HCHG RX REV CODE 250 W/ 637 OVERRIDE(OP): Performed by: PHYSICIAN ASSISTANT

## 2021-08-05 PROCEDURE — 85027 COMPLETE CBC AUTOMATED: CPT

## 2021-08-05 PROCEDURE — 700111 HCHG RX REV CODE 636 W/ 250 OVERRIDE (IP): Performed by: EMERGENCY MEDICINE

## 2021-08-05 PROCEDURE — 700102 HCHG RX REV CODE 250 W/ 637 OVERRIDE(OP): Performed by: INTERNAL MEDICINE

## 2021-08-05 PROCEDURE — A9270 NON-COVERED ITEM OR SERVICE: HCPCS | Performed by: STUDENT IN AN ORGANIZED HEALTH CARE EDUCATION/TRAINING PROGRAM

## 2021-08-05 PROCEDURE — 70450 CT HEAD/BRAIN W/O DYE: CPT | Mod: MG

## 2021-08-05 PROCEDURE — A9270 NON-COVERED ITEM OR SERVICE: HCPCS | Performed by: INTERNAL MEDICINE

## 2021-08-05 PROCEDURE — 700102 HCHG RX REV CODE 250 W/ 637 OVERRIDE(OP): Performed by: STUDENT IN AN ORGANIZED HEALTH CARE EDUCATION/TRAINING PROGRAM

## 2021-08-05 PROCEDURE — 83735 ASSAY OF MAGNESIUM: CPT

## 2021-08-05 PROCEDURE — 93306 TTE W/DOPPLER COMPLETE: CPT

## 2021-08-05 PROCEDURE — 93010 ELECTROCARDIOGRAM REPORT: CPT | Performed by: STUDENT IN AN ORGANIZED HEALTH CARE EDUCATION/TRAINING PROGRAM

## 2021-08-05 PROCEDURE — 770006 HCHG ROOM/CARE - MED/SURG/GYN SEMI*

## 2021-08-05 PROCEDURE — 84100 ASSAY OF PHOSPHORUS: CPT

## 2021-08-05 PROCEDURE — 93306 TTE W/DOPPLER COMPLETE: CPT | Mod: 26 | Performed by: INTERNAL MEDICINE

## 2021-08-05 PROCEDURE — 97166 OT EVAL MOD COMPLEX 45 MIN: CPT

## 2021-08-05 PROCEDURE — 97161 PT EVAL LOW COMPLEX 20 MIN: CPT

## 2021-08-05 PROCEDURE — 00C40ZZ EXTIRPATION OF MATTER FROM INTRACRANIAL SUBDURAL SPACE, OPEN APPROACH: ICD-10-PCS | Performed by: NEUROLOGICAL SURGERY

## 2021-08-05 PROCEDURE — 700111 HCHG RX REV CODE 636 W/ 250 OVERRIDE (IP): Performed by: INTERNAL MEDICINE

## 2021-08-05 PROCEDURE — 700102 HCHG RX REV CODE 250 W/ 637 OVERRIDE(OP): Performed by: HOSPITALIST

## 2021-08-05 PROCEDURE — 700111 HCHG RX REV CODE 636 W/ 250 OVERRIDE (IP): Performed by: PHYSICIAN ASSISTANT

## 2021-08-05 PROCEDURE — 700111 HCHG RX REV CODE 636 W/ 250 OVERRIDE (IP): Performed by: STUDENT IN AN ORGANIZED HEALTH CARE EDUCATION/TRAINING PROGRAM

## 2021-08-05 PROCEDURE — 99223 1ST HOSP IP/OBS HIGH 75: CPT | Performed by: HOSPITALIST

## 2021-08-05 PROCEDURE — A9270 NON-COVERED ITEM OR SERVICE: HCPCS | Performed by: HOSPITALIST

## 2021-08-05 PROCEDURE — A9270 NON-COVERED ITEM OR SERVICE: HCPCS | Performed by: PHYSICIAN ASSISTANT

## 2021-08-05 PROCEDURE — 80048 BASIC METABOLIC PNL TOTAL CA: CPT

## 2021-08-05 RX ORDER — BUTALBITAL, ACETAMINOPHEN AND CAFFEINE 50; 325; 40 MG/1; MG/1; MG/1
1 TABLET ORAL EVERY 6 HOURS PRN
Status: DISCONTINUED | OUTPATIENT
Start: 2021-08-05 | End: 2021-08-07

## 2021-08-05 RX ORDER — LEVETIRACETAM 500 MG/1
500 TABLET ORAL 2 TIMES DAILY
Status: DISCONTINUED | OUTPATIENT
Start: 2021-08-05 | End: 2021-08-07

## 2021-08-05 RX ORDER — HYDROMORPHONE HYDROCHLORIDE 1 MG/ML
0.5 INJECTION, SOLUTION INTRAMUSCULAR; INTRAVENOUS; SUBCUTANEOUS
Status: DISCONTINUED | OUTPATIENT
Start: 2021-08-05 | End: 2021-08-07

## 2021-08-05 RX ORDER — OXYCODONE HYDROCHLORIDE 5 MG/1
5 TABLET ORAL
Status: DISCONTINUED | OUTPATIENT
Start: 2021-08-05 | End: 2021-08-07

## 2021-08-05 RX ORDER — BUTALBITAL, ACETAMINOPHEN AND CAFFEINE 50; 325; 40 MG/1; MG/1; MG/1
1 TABLET ORAL EVERY 6 HOURS PRN
Status: DISCONTINUED | OUTPATIENT
Start: 2021-08-05 | End: 2021-08-05

## 2021-08-05 RX ORDER — POLYETHYLENE GLYCOL 3350 17 G/17G
1 POWDER, FOR SOLUTION ORAL DAILY
Status: DISCONTINUED | OUTPATIENT
Start: 2021-08-05 | End: 2021-08-07

## 2021-08-05 RX ORDER — OXYCODONE HYDROCHLORIDE 5 MG/1
2.5 TABLET ORAL
Status: DISCONTINUED | OUTPATIENT
Start: 2021-08-05 | End: 2021-08-05

## 2021-08-05 RX ORDER — HYDROMORPHONE HYDROCHLORIDE 1 MG/ML
0.5 INJECTION, SOLUTION INTRAMUSCULAR; INTRAVENOUS; SUBCUTANEOUS
Status: DISCONTINUED | OUTPATIENT
Start: 2021-08-05 | End: 2021-08-05

## 2021-08-05 RX ORDER — OXYCODONE HYDROCHLORIDE 5 MG/1
5 TABLET ORAL
Status: DISCONTINUED | OUTPATIENT
Start: 2021-08-05 | End: 2021-08-05

## 2021-08-05 RX ORDER — MAGNESIUM SULFATE HEPTAHYDRATE 40 MG/ML
2 INJECTION, SOLUTION INTRAVENOUS ONCE
Status: COMPLETED | OUTPATIENT
Start: 2021-08-05 | End: 2021-08-05

## 2021-08-05 RX ORDER — ACETAMINOPHEN 325 MG/1
650 TABLET ORAL EVERY 6 HOURS PRN
Status: DISCONTINUED | OUTPATIENT
Start: 2021-08-05 | End: 2021-08-07

## 2021-08-05 RX ORDER — OXYCODONE HYDROCHLORIDE 10 MG/1
10 TABLET ORAL
Status: DISCONTINUED | OUTPATIENT
Start: 2021-08-05 | End: 2021-08-07

## 2021-08-05 RX ADMIN — ALPRAZOLAM 0.5 MG: 0.5 TABLET ORAL at 05:51

## 2021-08-05 RX ADMIN — ACETAMINOPHEN 650 MG: 325 TABLET, FILM COATED ORAL at 05:46

## 2021-08-05 RX ADMIN — CEFAZOLIN SODIUM 2 G: 2 INJECTION, SOLUTION INTRAVENOUS at 02:55

## 2021-08-05 RX ADMIN — ONDANSETRON 4 MG: 2 INJECTION INTRAMUSCULAR; INTRAVENOUS at 05:44

## 2021-08-05 RX ADMIN — LEVOTHYROXINE SODIUM 137 MCG: 0.03 TABLET ORAL at 05:46

## 2021-08-05 RX ADMIN — BUTALBITAL, ACETAMINOPHEN, AND CAFFEINE 1 TABLET: 50; 325; 40 TABLET ORAL at 11:17

## 2021-08-05 RX ADMIN — OXYCODONE 5 MG: 5 TABLET ORAL at 21:45

## 2021-08-05 RX ADMIN — DIPHENHYDRAMINE HYDROCHLORIDE 25 MG: 50 INJECTION INTRAMUSCULAR; INTRAVENOUS at 19:03

## 2021-08-05 RX ADMIN — GABAPENTIN 100 MG: 100 CAPSULE ORAL at 05:46

## 2021-08-05 RX ADMIN — TACROLIMUS 4 MG: 1 CAPSULE ORAL at 05:54

## 2021-08-05 RX ADMIN — SERTRALINE HYDROCHLORIDE 100 MG: 100 TABLET ORAL at 05:47

## 2021-08-05 RX ADMIN — MINERAL OIL, PETROLATUM 1 APPLICATION: 425; 573 OINTMENT OPHTHALMIC at 21:32

## 2021-08-05 RX ADMIN — AMBRISENTAN 10 MG: 10 TABLET, FILM COATED ORAL at 06:00

## 2021-08-05 RX ADMIN — MAGNESIUM SULFATE 2 G: 2 INJECTION INTRAVENOUS at 08:45

## 2021-08-05 RX ADMIN — Medication 5 MG: at 21:31

## 2021-08-05 RX ADMIN — FLUDROCORTISONE ACETATE 0.5 MG: 0.1 TABLET ORAL at 05:54

## 2021-08-05 RX ADMIN — MYCOPHENOLATE MOFETIL 250 MG: 250 CAPSULE ORAL at 21:31

## 2021-08-05 RX ADMIN — OXYCODONE 5 MG: 5 TABLET ORAL at 17:11

## 2021-08-05 RX ADMIN — CEFAZOLIN SODIUM 2 G: 2 INJECTION, SOLUTION INTRAVENOUS at 11:21

## 2021-08-05 RX ADMIN — HYDROMORPHONE HYDROCHLORIDE 0.25 MG: 1 INJECTION, SOLUTION INTRAMUSCULAR; INTRAVENOUS; SUBCUTANEOUS at 00:34

## 2021-08-05 RX ADMIN — FAMOTIDINE 20 MG: 20 TABLET ORAL at 05:46

## 2021-08-05 RX ADMIN — PRAVASTATIN SODIUM 20 MG: 20 TABLET ORAL at 05:46

## 2021-08-05 RX ADMIN — MYCOPHENOLATE MOFETIL 250 MG: 250 CAPSULE ORAL at 05:53

## 2021-08-05 RX ADMIN — GABAPENTIN 100 MG: 100 CAPSULE ORAL at 21:32

## 2021-08-05 RX ADMIN — DOCUSATE SODIUM 100 MG: 100 CAPSULE, LIQUID FILLED ORAL at 05:47

## 2021-08-05 RX ADMIN — ACETAMINOPHEN 650 MG: 325 TABLET, FILM COATED ORAL at 00:26

## 2021-08-05 RX ADMIN — LEVETIRACETAM 500 MG: 500 TABLET ORAL at 08:45

## 2021-08-05 RX ADMIN — DOCUSATE SODIUM 50 MG AND SENNOSIDES 8.6 MG 1 TABLET: 8.6; 5 TABLET, FILM COATED ORAL at 21:31

## 2021-08-05 RX ADMIN — OMEPRAZOLE 20 MG: 20 CAPSULE, DELAYED RELEASE ORAL at 05:46

## 2021-08-05 RX ADMIN — HYDROMORPHONE HYDROCHLORIDE 0.5 MG: 1 INJECTION, SOLUTION INTRAMUSCULAR; INTRAVENOUS; SUBCUTANEOUS at 03:35

## 2021-08-05 RX ADMIN — ONDANSETRON 4 MG: 2 INJECTION INTRAMUSCULAR; INTRAVENOUS at 13:48

## 2021-08-05 RX ADMIN — LEVETIRACETAM 500 MG: 500 TABLET ORAL at 21:32

## 2021-08-05 RX ADMIN — TADALAFIL 20 MG: 20 TABLET ORAL at 21:00

## 2021-08-05 RX ADMIN — POLYETHYLENE GLYCOL 3350 1 PACKET: 17 POWDER, FOR SOLUTION ORAL at 11:26

## 2021-08-05 RX ADMIN — OXYCODONE 5 MG: 5 TABLET ORAL at 08:45

## 2021-08-05 RX ADMIN — OXYCODONE 5 MG: 5 TABLET ORAL at 12:42

## 2021-08-05 RX ADMIN — ALPRAZOLAM 0.5 MG: 0.5 TABLET ORAL at 14:42

## 2021-08-05 RX ADMIN — DOCUSATE SODIUM 100 MG: 100 CAPSULE, LIQUID FILLED ORAL at 21:32

## 2021-08-05 ASSESSMENT — COGNITIVE AND FUNCTIONAL STATUS - GENERAL
STANDING UP FROM CHAIR USING ARMS: A LITTLE
MOVING TO AND FROM BED TO CHAIR: A LITTLE
SUGGESTED CMS G CODE MODIFIER DAILY ACTIVITY: CI
SUGGESTED CMS G CODE MODIFIER MOBILITY: CK
CLIMB 3 TO 5 STEPS WITH RAILING: A LITTLE
MOBILITY SCORE: 18
DAILY ACTIVITIY SCORE: 23
MOVING FROM LYING ON BACK TO SITTING ON SIDE OF FLAT BED: A LOT
WALKING IN HOSPITAL ROOM: A LITTLE
HELP NEEDED FOR BATHING: A LITTLE

## 2021-08-05 ASSESSMENT — ENCOUNTER SYMPTOMS
SHORTNESS OF BREATH: 0
NAUSEA: 0
DIARRHEA: 0
HEADACHES: 1
FOCAL WEAKNESS: 1
WHEEZING: 0
VOMITING: 0
COUGH: 0
FEVER: 0
CONSTIPATION: 1
CHILLS: 0

## 2021-08-05 ASSESSMENT — GAIT ASSESSMENTS
DISTANCE (FEET): 10
DEVIATION: OTHER (COMMENT)
DISTANCE (FEET): 15

## 2021-08-05 ASSESSMENT — ACTIVITIES OF DAILY LIVING (ADL): TOILETING: INDEPENDENT

## 2021-08-05 NOTE — ANESTHESIA POSTPROCEDURE EVALUATION
Patient: Roxana Cuba    Procedure Summary     Date: 08/04/21 Room / Location: [facility] 06 / SURGERY [facility]    Anesthesia Start: 1742 Anesthesia Stop: 1955    Procedure: CRANIOTOMY (Left Head) Diagnosis:       Subdural hematoma (HCC)      (subdural hematoma )    Surgeons: Tramaine Arnold III, M.D. Responsible Provider: Ramses Salas M.D.    Anesthesia Type: general ASA Status: 3 - Emergent          Final Anesthesia Type: general  Last vitals  BP   Blood Pressure: (!) 97/44    Temp   36 °C (96.8 °F)    Pulse   79   Resp   14    SpO2   92 %      Anesthesia Post Evaluation    Patient location during evaluation: PACU  Patient participation: complete - patient participated  Level of consciousness: awake and alert  Pain score: 3    Airway patency: patent  Anesthetic complications: no  Cardiovascular status: hemodynamically stable  Respiratory status: acceptable  Hydration status: euvolemic    PONV: none          No complications documented.     Nurse Pain Score: 0 (NPRS)

## 2021-08-05 NOTE — ASSESSMENT & PLAN NOTE
Secondary to GLF 5 weeks ago.   MRI 8/3: 29 mm L SDH w/ 6 mm L->R shift.   8/4 L craniotomy with subgaleal drain  Neurochecks  levetiracetam for seizure ppx, hold dvt ppx.  Drain removed by neurosurgery   On 8/7 developed worsening SDH confirmed on imaging along with slurred speech and then aphasia  Transferred back to ICU 8/7 for close monitoring    NSX follow up - to discuss with family about further management; IR procedure planned for 8/12.   8/12-  Pending MMA procedure. Continue to monitor   8/13- repeat CT head improving. Continue to monitor   8/16- because of accident pls continue to monitor and consider to repeat head CT stat if any changes

## 2021-08-05 NOTE — OR NURSING
Patient in PACU in stable condition. VSS. Surgical dressing clean dry intact with hemovac drain to compression. Will continue to monitor and medicate appropriately.

## 2021-08-05 NOTE — DIETARY
"Nutrition services: Day 2 of admit.  Roxana Cuba is a 61 y.o. female with admitting DX of SDH (subdural hematoma).  Consult received for MST score of 3 per nutrition screen for unplanned weight loss of 14-23 lb x 3 months with poor PO intake.    Assessment:  Height: 175.3 cm (5' 9\")  Weight: 69.7 kg (153 lb 10.6 oz)   Body mass index is 22.69 kg/m²., BMI classification: Normal  Diet/Intake: Regular    Evaluation:   1. S/P craniotomy 8/4. History includes liver transplant, [place]'s disease, and gastric bypass.  2. Pt reports poor PO intake x 5 weeks following intestinal surgery. She reports only eating a few bites at meals. She reports weight loss over that time of 6-8 kg. Her usual weight is 74 kg. Pt with 5.8% weight loss x 5 weeks which is severe.  3. Pt states she does not like Boost/Ensure because she drank a lot of them after her bariatric surgery. She is agreeable to magic cup with dinner.  4. Skin: incision on head, no pressure injuries noted.  5. Labs and medications reviewed.    Malnutrition Risk: Pt meets criteria for severe malnutrition in context of acute illness related to poor appetite following intestinal surgery as evidenced by reported poor PO intake <50% of needs x 5 weeks with 5.8% weight loss x 5 weeks.    Recommendations/Plan:  1. Magic cup with dinner daily.    2. Encourage intake of meals >50%.  3. Document intake of all meals as % taken in ADL's to provide interdisciplinary communication across all shifts.   4. Monitor weight.  5. Nutrition rep will continue to see patient for ongoing meal and snack preferences.     RD following            "

## 2021-08-05 NOTE — ASSESSMENT & PLAN NOTE
On initial labs  Initially dehydrated prior to admission, patient blames the long airplane flight, although no renal dysfunction  Now improved with IVF

## 2021-08-05 NOTE — ANESTHESIA TIME REPORT
Anesthesia Start and Stop Event Times     Date Time Event    8/4/2021 1425 Ready for Procedure     1742 Anesthesia Start     1955 Anesthesia Stop        Responsible Staff  08/04/21    Name Role Begin End    Ramses Salas M.D. Anesth 1742 1955        Preop Diagnosis (Free Text):  Pre-op Diagnosis     subdrual hematoma         Preop Diagnosis (Codes):  Diagnosis Information     Diagnosis Code(s): Subdural hematoma (HCC) [S06.5X9A]        Post op Diagnosis  Subdural hematoma (HCC)      Premium Reason  A. 3PM - 7AM    Comments:

## 2021-08-05 NOTE — CONSULTS
"Hospital Medicine Consult Note    Date of Service  8/5/2021    Primary Care Physician  Elisa Phillip M.D.    Consult requested by  Dr. Delacruz    Reason for Consult  Craniotomy and taking over medical care    Chief Complaint  Chief Complaint   Patient presents with   • Shortness of Breath     x 2 days. Patient states she has used 3L NC at home for the past 10 years. Patient states she has a \"pulmonary syndrome.\" Patient is 97% on RA. Patient reports her saturation decreased when laying down.    • Skin Lesion     Patient reports sarcoids x 10 years    • Headache     x 3 weeks        History of Presenting Illness  Roxana Cuba is a 61 y.o. female with complex PMHx significant for COPD, chronic hypoxemic respiratory failure on 3L NC, pulmonary HTN, liver transplantation in 2011 for etoh cirrhosis, granulomatous hepatitis/sarcoid, Evon-en-Y gastric bypass 2018, MGUS, CKD, ?adrenal insufficiency, hypothyroidism who presented 8/3/2021 with worsening HA and visual changes x4 weeks after being diagnosed with a SDH secondary to ground level, mechanical fall 5 weeks ago while she was in [facility]. Patient also had laparotomy for \"twisting of the bowel\" in [facility] 5 weeks ago. No prior imaging available, but MRI done here reveals 29 mm subacute/chronic L SDH and SAH adjacent to L frontal/temporal lobes with 6 mm L->R shift. NSG was consulted and brought the patient to the OR and performed left craniotomy for a large subdural hematoma with membrane formation. Subgaleal drain was placed. Patient did have some hypotension in the ICU but this resolved. Her magnesium was repleted. Thus she was able to be transferred out to the neurosurgical floor. She had some constipation so her MiraLAX was scheduled. Discontinued maintenance IVF.    I discussed the plan of care with critical care Dr. Delacruz.    Review of Systems  Review of Systems   Constitutional: Negative for chills and fever.   Respiratory: Negative for cough, shortness " of breath and wheezing.    Cardiovascular: Negative for chest pain.   Gastrointestinal: Positive for constipation. Negative for diarrhea, nausea and vomiting.   Genitourinary: Negative for dysuria.   Neurological: Positive for focal weakness (R sided) and headaches (L).     all other systems reviewed and are negative    Past Medical History   has a past medical history of Anemia, Anesthesia, Breath shortness, Bronchitis ( ), Cardiomegaly, Chronic fatigue and malaise, Cirrhosis (HCC) (December 2011), CKD (chronic kidney disease) stage 3, GFR 30-59 ml/min (HCC), Diabetes (HCC), Fracture of left foot, GERD (gastroesophageal reflux disease), H/O Clostridium difficile infection (02-17-17), Hemorrhagic disorder (HCC), Hiatus hernia syndrome, High cholesterol, Hypothyroid, Jaundice (2009), Liver transplanted ([facility]), Low back pain (02-17-17), Mild aortic regurgitation and aortic valve sclerosis ( ), On home oxygen therapy, Platelet disorder (HCC), Pneumonia, Psychiatric disorder, Pulmonary hypertension ([facility]), S/P cholecystectomy, Small bowel obstruction ([facility]), Splenomegaly, and VRE (vancomycin-resistant Enterococci).    Surgical History   has a past surgical history that includes pr anesth,nose,sinus surgery; makayla by laparoscopy (9/19/2009); exam under anesthesia (11/11/2009); hysterectomy, total abdominal; gastroscopy-endo (3/12/2010); bronchoscopy-endo (5/29/2012); bronchoscopy-endo (6/19/2012); sigmoidoscopy flex (9/26/2012); recovery (2/27/2013); bronchoscopy-endo (11/15/2013); recovery (1/21/2014); recovery (3/24/2014); hemorrhoidectomy (11/11/2009); gastroscopy (9/28/2012); carpal tunnel release; bone marrow aspiration (12/31/2012); bone marrow biopsy, ndl/trocar (12/31/2012); recovery (3/31/2014); other abdominal surgery (December 2011); bone marrow aspiration (3/16/2015); bone marrow biopsy, ndl/trocar (3/16/2015); lung biopsy open (11/2016); tonsillectomy; other abdominal surgery (); breast biopsy (Left,  2/21/2017); colonoscopy (N/A, 3/27/2017); gastroscopy (N/A, 3/27/2017); gastric bypass laparoscopic; hernia repair; makayla by laparoscopy; other; other (12/11/2017); other; turbinate reduction (Bilateral, 10/4/2018); nasal reconstruction (Bilateral, 10/4/2018); ventral hernia repair robotic xi (N/A, 11/12/2018); and craniotomy (Left, 8/4/2021).    Family History  family history includes Alcohol/Drug in her maternal grandfather, maternal grandmother, and mother; Cancer in her paternal aunt; Diabetes in her father; Heart Disease in her father; Hyperlipidemia in her father and mother; Hypertension in her father; Non-contributory in her mother; Other in her father; Stroke in her father.    Social History   reports that she has never smoked. She has never used smokeless tobacco. She reports that she does not drink alcohol and does not use drugs.    Allergies  Allergies   Allergen Reactions   • Rhubarb Anaphylaxis   • Nsaids      Can not take due to hx of liver transplant    • Organic Nitrates      Nitroglycerin products should be avoided with the use of PDE-5 inhibitors as the combination can result in severe hypotension.  RD clarified with pt: Nitrates in food are okay and pt does not want nitrates to be listed as food allergy. Pt only avoids medications with nitrates.    • Other Drug      Any medication that may interact with cyclosporine, tacrolimus, sirolimus, or prograf (due to hx of liver transplant)    • Vancomycin Hcl      Causes red man syndrome when infused to fast         Medications  No current facility-administered medications on file prior to encounter.     Current Outpatient Medications on File Prior to Encounter   Medication Sig Dispense Refill   • ALPRAZolam (XANAX) 1 MG Tab Take 1 mg by mouth 3 times a day as needed for Anxiety.     • tadalafil (CIALIS) 20 MG tablet Take 20 mg by mouth at bedtime.     • aspirin EC (ECOTRIN) 81 MG Tablet Delayed Response Take 81 mg by mouth every day.     • mycophenolate  "(CELLCEPT) 250 MG Cap Take 250 mg by mouth 2 times a day.     • fludrocortisone (FLORINEF) 0.1 MG Tab Take 0.5 mg by mouth every day.     • gabapentin (NEURONTIN) 100 MG Cap Take 100 mg by mouth 2 times a day.     • furosemide (LASIX) 20 MG Tab Take 20 mg by mouth every day.     • ambrisentan (LETAIRIS) 10 MG tablet Take 10 mg by mouth every day.     • levothyroxine (SYNTHROID) 137 MCG Tab Take 137 mcg by mouth every morning on an empty stomach.     • metoclopramide (REGLAN) 10 MG Tab Take 10 mg by mouth as needed. Indications: Nausea and Vomiting     • pravastatin (PRAVACHOL) 20 MG Tab Take 20 mg by mouth every day.     • [facility] ESOMEPRAZOLE MAGNESIUM 40 MG PO CPDR Take 40 mg by mouth every day.     • tacrolimus (PROGRAF) 1 MG Cap Take 4 mg by mouth every day. Patient take 1mg + 3mg = 4mg     • sertraline (ZOLOFT) 100 MG Tab Take 100 mg by mouth every day.     • Naloxegol Oxalate (MOVANTIK) 25 MG Tab Take 25 mg by mouth every day.     • levETIRAcetam (KEPPRA) 500 MG Tab Take 500 mg by mouth 2 times a day.     • acetaminophen (TYLENOL) 500 MG Tab Take 500 mg by mouth every 6 hours as needed for Mild Pain.     • therapeutic multivitamin-minerals (THERAGRAN-M) Tab Take 1 tablet by mouth every day.     • Calcium Carbonate (CALCIUM 500 PO) Take 1 tablet by mouth every day.     • Hyoscyamine Sulfate (HYOSCYAMINE PO) Take 0.2 mg by mouth as needed.     • meclizine (ANTIVERT) 12.5 MG Tab Take 25 mg by mouth as needed for Nausea/Vomiting.     • Prucalopride Succinate 2 MG Tab Take 2 mg by mouth every day.         Physical Exam  Weight/BMI: Body mass index is 22.69 kg/m².  /60   Pulse 83   Temp 36.6 °C (97.8 °F) (Temporal)   Resp 12   Ht 1.753 m (5' 9\")   Wt 69.7 kg (153 lb 10.6 oz)   SpO2 93%    Vitals:    08/05/21 0630 08/05/21 0700 08/05/21 0800 08/05/21 0900   BP: (!) 95/51 (!) 86/53 (!) 81/51 134/60   Pulse: 71 67 75 83   Resp: 13 19 19 12   Temp:   36.6 °C (97.8 °F)    TempSrc:   Temporal    SpO2: 92% 93% 92% " "93%   Weight:       Height:        Oxygen Therapy:  Pulse Oximetry: 93 %, O2 (LPM): 3, O2 Delivery Device: Nasal Cannula  Physical Exam  Constitutional:       General: She is not in acute distress.     Appearance: She is well-developed. She is not diaphoretic.   HENT:      Head: Normocephalic.      Right Ear: External ear normal.      Left Ear: External ear normal.      Mouth/Throat:      Pharynx: No oropharyngeal exudate or posterior oropharyngeal erythema.   Eyes:      General:         Right eye: No discharge.         Left eye: No discharge.      Extraocular Movements: Extraocular movements intact.      Conjunctiva/sclera: Conjunctivae normal.   Cardiovascular:      Rate and Rhythm: Normal rate.      Heart sounds: No gallop.    Pulmonary:      Effort: No respiratory distress.      Breath sounds: No wheezing.   Abdominal:      General: There is no distension.      Tenderness: There is no abdominal tenderness. There is no guarding.      Comments: Healing surgical wounds   Musculoskeletal:         General: No swelling or tenderness.   Skin:     General: Skin is warm.      Comments: Head bandage with drain present, draining conor blood   Neurological:      Mental Status: She is alert and oriented to person, place, and time. Mental status is at baseline.      Motor: Weakness ( Right leg) present.      Comments: Sensation intact to light touch distally all 4 extremities and face. Able to spell the word \"world\" forwards and backwards.    Psychiatric:         Mood and Affect: Mood normal.         Behavior: Behavior normal.         Laboratory:   Objective   Recent Results (from the past 24 hour(s))   EKG    Collection Time: 21 10:41 PM   Result Value Ref Range    Report       [facility]    Test Date:  2021  Pt Name:    VICK LI             Department: [facility]  MRN:        [id number]                      Room:       [facility]  Gender:     Female                       Technician: CALOS  :        1960    "                Requested By:WENDIE ANGELA  Order #:    [id number]                    Reading MD: Leah Nicholson MD    Measurements  Intervals                                Axis  Rate:       51                           P:          38  KY:         144                          QRS:        18  QRSD:       76                           T:          45  QT:         540  QTc:        498    Interpretive Statements  SINUS BRADYCARDIA  BORDERLINE LOW VOLTAGE IN FRONTAL LEADS  BORDERLINE PROLONGED QT INTERVAL  Compared to ECG 08/03/2021 05:11:25  Sinus rhythm no longer present  Electronically Signed On 8-5-2021 7:01:49 PDT by Leah Nicholson MD     CBC WITHOUT DIFFERENTIAL    Collection Time: 08/05/21  4:30 AM   Result Value Ref Range    WBC 6.3 4.8 - 10.8 K/uL    RBC 3.58 (L) 4.20 - 5.40 M/uL    Hemoglobin 11.0 (L) 12.0 - 16.0 g/dL    Hematocrit 33.9 (L) 37.0 - 47.0 %    MCV 94.7 81.4 - 97.8 fL    MCH 30.7 27.0 - 33.0 pg    MCHC 32.4 (L) 33.6 - 35.0 g/dL    RDW 57.1 (H) 35.9 - 50.0 fL    Platelet Count 164 164 - 446 K/uL    MPV 8.9 (L) 9.0 - 12.9 fL   Basic Metabolic Panel    Collection Time: 08/05/21  4:30 AM   Result Value Ref Range    Sodium 143 135 - 145 mmol/L    Potassium 4.0 3.6 - 5.5 mmol/L    Chloride 110 96 - 112 mmol/L    Co2 25 20 - 33 mmol/L    Glucose 173 (H) 65 - 99 mg/dL    Bun 9 8 - 22 mg/dL    Creatinine 0.55 0.50 - 1.40 mg/dL    Calcium 7.8 (L) 8.5 - 10.5 mg/dL    Anion Gap 8.0 7.0 - 16.0   MAGNESIUM    Collection Time: 08/05/21  4:30 AM   Result Value Ref Range    Magnesium 1.7 1.5 - 2.5 mg/dL   PHOSPHORUS    Collection Time: 08/05/21  4:30 AM   Result Value Ref Range    Phosphorus 3.8 2.5 - 4.5 mg/dL   ESTIMATED GFR    Collection Time: 08/05/21  4:30 AM   Result Value Ref Range    GFR If African American >60 >60 mL/min/1.73 m 2    GFR If Non African American >60 >60 mL/min/1.73 m 2       (click the triangle to expand results)    Imaging:  CT-HEAD W/O   Final Result      1.  Status post left frontal temporal  craniotomy with evacuation of left subdural hematoma. Minimal residual hemorrhage and air is present in the left subdural space.      2.  No residual left to right midline shift.      3.  Underlying atrophy and small vessel ischemic changes again noted.      CT-HEAD W/O   Final Result      1.  Stable appearance of left frontal subdural hematoma with residual 7.5 mm left-to-right midline shift.      2.  No new hemorrhage.      3.  No herniation.      DX-CHEST-PORTABLE (1 VIEW)   Final Result      1.  Left basilar opacity may represent atelectasis or consolidation.   2.  Small left pleural effusion may be present.   3.  Atherosclerotic plaque.      MR-BRAIN-W/O   Final Result   Addendum 1 of 1   Findings were discussed with ROBERT BRYANT on 8/3/2021 1:44 PM.      Final      1.  There is subacute/chronic subdural and subarachnoid hemorrhages adjacent to the left frontal and temporal lobes. The maximum transverse dimension measures an approximately 29 mm. There is an approximately 6 mm midline shift towards right side.   2.  Mild cerebral volume loss.   3.  Mild chronic microvascular ischemic disease.      CT-HEAD W/O   Final Result      23 mm thick mixed density left frontotemporal convexity subdural hematoma with mass effect including 8 mm rightward midline shift/subfalcine herniation.      These findings were discussed with ROBERT BRYANT on 8/3/2021 9:01 AM.      DX-ABDOMEN COMPLETE WITH AP OR PA CXR   Final Result      1.  Small left pleural effusion.   2.  Moderate amount of colonic stool.   3.  No evidence of bowel obstruction.   4.  Scoliosis.      EC-ECHOCARDIOGRAM COMPLETE W/ CONT    (Results Pending)     Assessment/Plan:  * Traumatic subdural hematoma without loss of consciousness (HCC)  Assessment & Plan  Secondary to GLF 5 weeks ago.   MRI 8/3: 29 mm L SDH w/ 6 mm L->R shift.   8/4 L craniotomy with subgaleal drain  neurochecks  levetiracetam for seizure ppx, hold dvt ppx.  Drain management by  neurosurgery    Hypernatremia  Assessment & Plan  On initial labs  Initially dehydrated prior to admission, patient blames the long airplane flight, although no renal dysfunction  Now improved with IVF    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HCC)- (present on admission)  Assessment & Plan  Continue fludrocortisone  With some hypotension in ICU  Monitor blood pressures    Hypokalemia  Assessment & Plan  Repleting as needed    GERD (gastroesophageal reflux disease)- (present on admission)  Assessment & Plan  Continue PPI    Pulmonary hypertension (HCC)- (present on admission)  Assessment & Plan  Chronic  Not in acute RHF.   Echo pending.   Continue home ambrisentan and tadalafil.    Chronic respiratory failure with hypoxia (HCC)- (present on admission)  Assessment & Plan  CXR showed L basilar opacity likely atelectasis. IS.     Acquired hypothyroidism- (present on admission)  Assessment & Plan  Continue levothyroxine    Status post liver transplant Dr. Canada ([facility])- (present on admission)  Overview  -December 2011: Status post liver transplant for end stage liver disease at [facility], followed by Dr. Canada.        Assessment & Plan  For alcoholic cirrhosis  Continue home tacrolimus and MMF    VTE prophylaxis:  SCDs

## 2021-08-05 NOTE — RESPIRATORY CARE
COPD EDUCATION by COPD CLINICAL EDUCATOR  8/5/2021 at 3:28 PM by Mesha Miller, RRT     Patient reviewed by COPD education team. Patient does not have a history or diagnosis of COPD. Patient is a smoker, smoking cessation education done. A comprehensive packet with tips to quit and information on outpatient classes given to patient.

## 2021-08-05 NOTE — ANESTHESIA PROCEDURE NOTES
Airway    Date/Time: 8/4/2021 5:49 PM  Performed by: Dk Petersen M.D.  Authorized by: Ramses Salas M.D.     Location:  OR  Urgency:  Elective  Indications for Airway Management:  Anesthesia      Spontaneous Ventilation: absent    Sedation Level:  Deep  Preoxygenated: Yes    Patient Position:  Sniffing  Final Airway Type:  Endotracheal airway  Final Endotracheal Airway:  ETT  Cuffed: Yes    Technique Used for Successful ETT Placement:  Direct laryngoscopy    Insertion Site:  Oral  Blade Type:  Mariza  Laryngoscope Blade/Videolaryngoscope Blade Size:  3  ETT Size (mm):  7.0  Measured from:  Teeth  ETT to Teeth (cm):  22  Placement Verified by: auscultation and capnometry    Cormack-Lehane Classification:  Grade IIa - partial view of glottis  Number of Attempts at Approach:  1

## 2021-08-05 NOTE — PROGRESS NOTES
Patient arrives to [facility].  VSS on 3Liters            4 Eyes Skin Assessment Completed by Venus LYLE RN and Stephani LYLE RN.    Head Incision  Ears WDL  Nose WDL  Mouth WDL  Neck WDL  Breast/Chest WDL  Shoulder Blades WDL  Spine WDL  (R) Arm/Elbow/Hand WDL  (L) Arm/Elbow/Hand WDL  Abdomen Scar  Groin WDL  Scrotum/Coccyx/Buttocks WDL  (R) Leg Bruising  (L) Leg Bruising  (R) Heel/Foot/Toe WDL  (L) Heel/Foot/Toe Scab          Devices In Places Blood Pressure Cuff, SCD's and Nasal Cannula      Interventions In Place Gray Ear Foams    Possible Skin Injury No    Pictures Uploaded Into Epic N/A  Wound Consult Placed N/A  RN Wound Prevention Protocol Ordered No

## 2021-08-05 NOTE — PROGRESS NOTES
Neurosurgery Progress Note    Subjective:  S/p fall with left SDH.  POD#1 left craniotomy for SDH  Reports HA this AM, not well controlled.  CT this am looks good and has been reviewed by Dr. Arnold.   Drain with 150 cc output last 8 hours.    Has h/o liver transplant    Exam:  A&O x3  Speech fluent and clear.   CN 2-12 grossly intact.  EOMI, PERRL.  Moves extremities x4.    BP  Min: 86/53  Max: 132/62  Pulse  Av.1  Min: 56  Max: 79  Resp  Av.7  Min: 12  Max: 22  Temp  Av.1 °C (97 °F)  Min: 35.8 °C (96.5 °F)  Max: 36.8 °C (98.2 °F)  SpO2  Av.6 %  Min: 92 %  Max: 97 %    No data recorded    Recent Labs     21  0656 21  0445 21  0430   WBC 3.4* 3.7* 6.3   RBC 4.11* 3.37* 3.58*   HEMOGLOBIN 12.5 10.2* 11.0*   HEMATOCRIT 38.7 31.9* 33.9*   MCV 94.2 94.7 94.7   MCH 30.4 30.3 30.7   MCHC 32.3* 32.0* 32.4*   RDW 60.7* 60.1* 57.1*   PLATELETCT 174 146* 164   MPV 9.2 9.5 8.9*     Recent Labs     21  0656 21  0445 21  0430   SODIUM 148* 140 143   POTASSIUM 2.8* 3.0* 4.0   CHLORIDE 109 108 110   CO2 25 26 25   GLUCOSE 82 109* 173*   BUN 10 12 9   CREATININE 0.51 0.59 0.55   CALCIUM 8.5 7.6* 7.8*     Recent Labs     21  1144   INR 1.13     Recent Labs     21  1117   REACTMIN 4.2*   CLOTKINET 1.3   CLOTANGL 73.7   MAXCLOTS 54.0   PRCINADP 14.4   PRCINAA 41.3*       Intake/Output                       21 0700 - 21 0659 21 07 - 21 0659     9489-5285 7276-2523 Total 6902-7171 2081-7495 Total                 Intake    I.V.  1050  1195.8 2245.8  --  -- --    Magnesium Sulfate Volume 50 -- 50 -- -- --    Cardene Volume -- 0 0 -- -- --    Volume (mL) (Lactated Ringers) 4386 037 9495 -- -- --    Volume (mL) (NS infusion) -- 695.8 695.8 -- -- --    IV Piggyback  --  200 200  --  -- --    Volume (mL) (levETIRAcetam (Keppra) 500 mg in 100 mL NaCl IV premix) -- 100 100 -- -- --    Volume (mL) (ceFAZolin in dextrose (ANCEF) IVPB premix 2 g) -- 100  100 -- -- --    Total Intake 1050 1395.8 2445.8 -- -- --       Output    Urine  600  510 1110  --  -- --    Urine -- 100 100 -- -- --    Number of Times Voided 3 x -- 3 x -- -- --    Urine Void (mL) 600 -- 600 -- -- --    Output (mL) (Urethral Catheter Latex 16 Fr.) -- 410 410 -- -- --    Emesis  --  10 10  --  -- --    Emesis -- 10 10 -- -- --    Emesis - Number of Times -- 5 x 5 x -- -- --    Drains  --  310 310  --  -- --    Output (mL) (Closed/Suction Drain 1 Left Other (Comment) Hemovac) -- 310 310 -- -- --    Blood  --  200 200  --  -- --    Est. Blood Loss -- 200 200 -- -- --    Total Output 600 1030 1630 -- -- --       Net I/O     450 365.8 815.8 -- -- --            Intake/Output Summary (Last 24 hours) at 8/5/2021 0815  Last data filed at 8/5/2021 0600  Gross per 24 hour   Intake 2445.82 ml   Output 1630 ml   Net 815.82 ml            • oxyCODONE immediate-release  5 mg Q3HRS PRN    Or   • oxyCODONE immediate-release  10 mg Q3HRS PRN    Or   • HYDROmorphone  0.5 mg Q3HRS PRN   • levETIRAcetam  500 mg BID   • magnesium sulfate  2 g Once   • melatonin  5 mg Nightly   • MD Alert...Adult ICU Electrolyte Replacement per Pharmacy   PHARMACY TO DOSE   • Pharmacy Consult Request  1 Each PHARMACY TO DOSE   • MD ALERT...DO NOT ADMINISTER NSAIDS or ASPIRIN unless ORDERED By Neurosurgery  1 Each PRN   • ondansetron  4 mg Q4HRS PRN   • diphenhydrAMINE  25 mg Q6HRS PRN   • labetalol  10 mg Q HOUR PRN   • hydrALAZINE  10 mg Q HOUR PRN   • cloNIDine  0.1 mg Q4HRS PRN   • niCARdipine infusion  0-15 mg/hr Continuous   • docusate sodium  100 mg BID   • senna-docusate  1 tablet Nightly   • senna-docusate  1 tablet Q24HRS PRN   • polyethylene glycol/lytes  1 Packet BID PRN   • magnesium hydroxide  30 mL QDAY PRN   • bisacodyl  10 mg Q24HRS PRN   • fleet  1 Each Once PRN   • artificial tears  1 Application Q8HRS   • NS   Continuous   • acetaminophen  650 mg Q6HRS    Followed by   • [START ON 8/10/2021] acetaminophen  650 mg Q6HRS  PRN   • ceFAZolin  2 g Q8HR   • diphenhydrAMINE  25 mg Q6HRS PRN    Or   • diphenhydrAMINE  25 mg Q6HRS PRN   • benzocaine-menthol  1 Lozenge Q2HRS PRN   • ALPRAZolam  0.5 mg TID PRN   • fludrocortisone  0.5 mg DAILY   • gabapentin  100 mg BID   • levothyroxine  137 mcg AM ES   • pravastatin  20 mg DAILY   • sertraline  100 mg DAILY   • mycophenolate  250 mg BID   • omeprazole  20 mg DAILY   • tacrolimus  4 mg DAILY   • ambrisentan  10 mg DAILY   • tadalafil  20 mg QHS       Assessment and Plan:  Hospital day #3  POD #1  Leave drain for two days per Dr. Arnold.  Continue q2 hour neuro checks.  Keep in ICU today.  Ok for therapies as tolerated.  Will add fioricet for HA.   Continue medical care.   Prophylactic anticoagulation: no         Start date/time: TBD

## 2021-08-05 NOTE — ASSESSMENT & PLAN NOTE
Chronic, not in acute RHF.   Echo 8/5 unremarkable and unchanged from September 2018. RSVP 35 mmHg  Previously on home ambrisentan and tadalafil.  Unable to get above meds due to cortrak  Consulted pulmonologist Dr. Grady  Substitute with liquid sildenafil for now per pulm    Considered bosentan 62.5 but patient has liver transplant so will hold off to avoid hepatotoxicity    Monitor patient's oxygen requirements and fluid status.  If patient becomes more hypoxic, check BNP as well as chest x-ray.  If those are worsened with, start diuresis and re-check echo.  If PA pressure greater than 45 on repeat echo, notify pulmonology.    Gentle diuresis as BP tolerates

## 2021-08-05 NOTE — OR NURSING
Patient to floor with this RN on monitor in stable condition. VSS. Surgical dressing clean dry intact with hemovac drain to compression. Aox4 and on 4 L O2 attached to 3/4 full tank. Neurologically intact. Resting comfortably in bed, no complaints of pain or nausea. No belongings. No further needs.

## 2021-08-05 NOTE — CARE PLAN
The patient is Stable - Low risk of patient condition declining or worsening    Shift Goals  Clinical Goals: stable neuro exam  Patient Goals: pain management  Family Goals: Visitation    Progress made toward(s) clinical / shift goals: Q2 neuro checks. Stable    Patient is not progressing towards the following goals: pain control      Problem: Pain - Standard  Goal: Alleviation of pain or a reduction in pain to the patient’s comfort goal  Outcome: Not Progressing  Note: Patient nauseous overnight. IV pain meds needed to be given, but are making patient hypotensive. New oral meds added by neuro PA.        Problem: Psychosocial  Goal: Patient's level of anxiety will decrease  Outcome: Progressing

## 2021-08-05 NOTE — OP REPORT
DATE OF SERVICE:  08/04/2021     PREOPERATIVE DIAGNOSES:  Acute on chronic subdural hematoma    Procedure performed: left-sided   craniotomy for evacuation of subdural hematoma and stripping of membranes.     SURGEON:  Tramaine Arnold III, MD     ASSISTANT:  Brittny Mosher PA-C     ANESTHESIA:  General.     COMPLICATIONS:  None.     ESTIMATED BLOOD LOSS:  50 mL.     FINDINGS:  Dense and adherent membranization and mixed density subdural   hematoma evacuated with excellent brain decompression.  Meticulous hemostasis.     COMPLICATIONS:  None.     DRAINS:  Subgaleal Hemovac.     DISPOSITION:  Extubated to recovery and to the floor.     HISTORY OF PRESENT ILLNESS:  This is a 61-year-old woman who had fallen about   5 weeks previous while visiting another country.  She had a known subdural   hematoma, but had no symptoms.  She returned with increasing headaches,   admitted to the hospital.  A CAT scan shows an acute on chronic subdural   hematoma with shift primarily in the frontotemporal region.  I explained the   risks, benefits and alternatives of evacuation of the subdural hematoma via   craniotomy to the patient including pain, infection, bleeding, CSF leak,   failure to completely resolve symptoms, neurologic deficit, weakness,   numbness, speech difficulties, need for recurrent evacuation due to the   patient's liver transplant and the chance of adherent coagulopathy.  She   understood the risks, benefits and agreed to consent.     SUMMARY OF OPERATIVE PROCEDURE:  The patient was taken to the operating suite,   placed under general anesthesia.  Holbrook catheter was placed on a regular bed   supine, head turned to the right and the left hemicranium clipped of hair.  A   curvilinear incision with zygoma as the base was drawn out of the left skull.    Preoperative antibiotics were given.  Proper timeout was performed.  Keppra   was given.  The patient was prepped and draped in sterile fashion.     Curvilinear incision  was made and used monopolar and bipolar electrocautery   for hemostasis.  The myocutaneous flap raised in 1 piece, was held back with   self-retaining retractors.  Standard craniotome was used to make 3 bur holes   and these were connected with a B1 foot plate, bone flap elevated.  The dura   bipolared circumferential tack-up epidural sutures done. We made a curvilinear   incision in the dura, which is temporal region at the base and acute on   chronic subdural hematoma with extensive membranization.  Copious irrigation and stripping of membranes   was performed with the icicle bipolar.  This was done circumferentially all   the way to the frontal pole, temporal pole.  We had excellent decompression   with good hemostasis.  There was an engorged venous lake on parietal area of the   brain but this did not appear to be bleeding.  It did not appear   like an arteriovenous malformation.  We were happy with our final evacuation   of the brain, started to come up during the case.  The brain parenchyma was   pristine.  We did a circumferential Surgiflo deep in the crevices to prevent   recurrence.  We did not leave a subdural drain.  We felt that hemostasis was   meticulous.  We laid down the dura, did a couple of tack-up, laid down the   native Duragen replacement for the defects.  We then copiously irrigated with   bacitracin saline.  We laid down the native bone flap with a central tack-up   suture with standard titanium plate back to the skull.  We copiously irrigated   again with bacitracin-infused saline.  We tunneled a subgaleal Hemovac,   secured the skin with stitch.  Closed the wound in anatomic layers, 3-0 Vicryl   for the temporalis fascia, 3-0 Vicryl for the galea and staples for the skin.    Sterile dressings were applied.  The patient was extubated to go to   recovery.     There were no complications.  Needle and sponge count correct at the end of   the case.        ______________________________  Tramaine RANGEL  MD DONY Arnold III/SHAHEEN/[facility] [place]    DD:  08/05/2021 08:17  DT:  08/05/2021 08:59    Job#:  [id number]

## 2021-08-05 NOTE — PROGRESS NOTES
Notified MD of patient complaints of nausea and episodic dry heaving despite administration of prn antinausea medication. New orders received.  Notified ICU pharmacist of patient inability to take PO Cialis secondary to nausea. Okay to administer before midnight without re-timing the medication.

## 2021-08-05 NOTE — OR SURGEON
Immediate Post OP Note    PreOp Diagnosis: Large subdural hematoma, Left      PostOp Diagnosis: Subdural hematoma L      Procedure(s):  CRANIOTOMY - Wound Class: Clean with Drain    Surgeon(s):  Tramaine Arnold III, M.D.    Anesthesiologist/Type of Anesthesia:  Anesthesiologist: Ramses Salas M.D./General    Surgical Staff:  Assistant: Brittny Mosher P.A.-C.  Circulator: Marialuisa Lawson R.N.  Operating Room Assistant (ORA): Cosme Salazar Circulator: Reinaldo Avilez R.N.  Relief Scrub: Katrina Edwards  Scrub Person: Andriy Tsai    Specimens removed if any:  * No specimens in log *    Estimated Blood Loss: 75mls    Findings: Large subdural hematoma, L with membrane formation     Complications: None. Subgaleal drain placed        8/4/2021 7:50 PM Brittny Mosher P.A.-C.

## 2021-08-06 ENCOUNTER — HOME HEALTH ADMISSION (OUTPATIENT)
Dept: HOME HEALTH SERVICES | Facility: HOME HEALTHCARE | Age: 61
End: 2021-08-06
Payer: MEDICARE

## 2021-08-06 PROBLEM — E87.0 HYPERNATREMIA: Status: RESOLVED | Noted: 2021-08-03 | Resolved: 2021-08-06

## 2021-08-06 PROBLEM — E87.6 HYPOKALEMIA: Status: RESOLVED | Noted: 2017-03-23 | Resolved: 2021-08-06

## 2021-08-06 LAB
ANION GAP SERPL CALC-SCNC: 8 MMOL/L (ref 7–16)
APTT PPP: 26.6 SEC (ref 24.7–36)
BUN SERPL-MCNC: 9 MG/DL (ref 8–22)
CALCIUM SERPL-MCNC: 7.9 MG/DL (ref 8.5–10.5)
CHLORIDE SERPL-SCNC: 109 MMOL/L (ref 96–112)
CO2 SERPL-SCNC: 25 MMOL/L (ref 20–33)
CREAT SERPL-MCNC: 0.51 MG/DL (ref 0.5–1.4)
ERYTHROCYTE [DISTWIDTH] IN BLOOD BY AUTOMATED COUNT: 59.7 FL (ref 35.9–50)
GLUCOSE SERPL-MCNC: 103 MG/DL (ref 65–99)
HCT VFR BLD AUTO: 31.4 % (ref 37–47)
HGB BLD-MCNC: 10.1 G/DL (ref 12–16)
MAGNESIUM SERPL-MCNC: 2 MG/DL (ref 1.5–2.5)
MCH RBC QN AUTO: 30.8 PG (ref 27–33)
MCHC RBC AUTO-ENTMCNC: 32.2 G/DL (ref 33.6–35)
MCV RBC AUTO: 95.7 FL (ref 81.4–97.8)
PHOSPHATE SERPL-MCNC: 2.6 MG/DL (ref 2.5–4.5)
PLATELET # BLD AUTO: 153 K/UL (ref 164–446)
PMV BLD AUTO: 9 FL (ref 9–12.9)
POTASSIUM SERPL-SCNC: 3.5 MMOL/L (ref 3.6–5.5)
RBC # BLD AUTO: 3.28 M/UL (ref 4.2–5.4)
SODIUM SERPL-SCNC: 142 MMOL/L (ref 135–145)
WBC # BLD AUTO: 4.8 K/UL (ref 4.8–10.8)

## 2021-08-06 PROCEDURE — A9270 NON-COVERED ITEM OR SERVICE: HCPCS | Performed by: PHYSICIAN ASSISTANT

## 2021-08-06 PROCEDURE — A9270 NON-COVERED ITEM OR SERVICE: HCPCS | Performed by: STUDENT IN AN ORGANIZED HEALTH CARE EDUCATION/TRAINING PROGRAM

## 2021-08-06 PROCEDURE — A9270 NON-COVERED ITEM OR SERVICE: HCPCS | Performed by: INTERNAL MEDICINE

## 2021-08-06 PROCEDURE — 700102 HCHG RX REV CODE 250 W/ 637 OVERRIDE(OP): Performed by: INTERNAL MEDICINE

## 2021-08-06 PROCEDURE — 700111 HCHG RX REV CODE 636 W/ 250 OVERRIDE (IP): Performed by: PHYSICIAN ASSISTANT

## 2021-08-06 PROCEDURE — 700102 HCHG RX REV CODE 250 W/ 637 OVERRIDE(OP): Performed by: PHYSICIAN ASSISTANT

## 2021-08-06 PROCEDURE — 97530 THERAPEUTIC ACTIVITIES: CPT | Mod: CQ

## 2021-08-06 PROCEDURE — 80048 BASIC METABOLIC PNL TOTAL CA: CPT

## 2021-08-06 PROCEDURE — 36415 COLL VENOUS BLD VENIPUNCTURE: CPT

## 2021-08-06 PROCEDURE — 700111 HCHG RX REV CODE 636 W/ 250 OVERRIDE (IP): Performed by: INTERNAL MEDICINE

## 2021-08-06 PROCEDURE — 700102 HCHG RX REV CODE 250 W/ 637 OVERRIDE(OP): Performed by: STUDENT IN AN ORGANIZED HEALTH CARE EDUCATION/TRAINING PROGRAM

## 2021-08-06 PROCEDURE — 770006 HCHG ROOM/CARE - MED/SURG/GYN SEMI*

## 2021-08-06 PROCEDURE — 85027 COMPLETE CBC AUTOMATED: CPT

## 2021-08-06 PROCEDURE — 85730 THROMBOPLASTIN TIME PARTIAL: CPT

## 2021-08-06 PROCEDURE — 84100 ASSAY OF PHOSPHORUS: CPT

## 2021-08-06 PROCEDURE — 97116 GAIT TRAINING THERAPY: CPT | Mod: CQ

## 2021-08-06 PROCEDURE — 99232 SBSQ HOSP IP/OBS MODERATE 35: CPT | Performed by: INTERNAL MEDICINE

## 2021-08-06 PROCEDURE — 83735 ASSAY OF MAGNESIUM: CPT

## 2021-08-06 RX ORDER — OXYCODONE HYDROCHLORIDE 10 MG/1
10 TABLET ORAL EVERY 6 HOURS PRN
Qty: 20 TABLET | Refills: 0 | Status: SHIPPED | OUTPATIENT
Start: 2021-08-06 | End: 2021-08-11

## 2021-08-06 RX ORDER — POTASSIUM CHLORIDE 20 MEQ/1
40 TABLET, EXTENDED RELEASE ORAL ONCE
Status: COMPLETED | OUTPATIENT
Start: 2021-08-06 | End: 2021-08-06

## 2021-08-06 RX ADMIN — LEVOTHYROXINE SODIUM 137 MCG: 0.03 TABLET ORAL at 04:51

## 2021-08-06 RX ADMIN — PRAVASTATIN SODIUM 20 MG: 20 TABLET ORAL at 04:46

## 2021-08-06 RX ADMIN — POTASSIUM CHLORIDE 40 MEQ: 1500 TABLET, EXTENDED RELEASE ORAL at 08:55

## 2021-08-06 RX ADMIN — MYCOPHENOLATE MOFETIL 250 MG: 250 CAPSULE ORAL at 17:50

## 2021-08-06 RX ADMIN — FLUDROCORTISONE ACETATE 0.5 MG: 0.1 TABLET ORAL at 04:47

## 2021-08-06 RX ADMIN — OXYCODONE HYDROCHLORIDE 10 MG: 10 TABLET ORAL at 08:55

## 2021-08-06 RX ADMIN — AMBRISENTAN 10 MG: 10 TABLET, FILM COATED ORAL at 06:00

## 2021-08-06 RX ADMIN — LEVETIRACETAM 500 MG: 500 TABLET ORAL at 17:50

## 2021-08-06 RX ADMIN — OXYCODONE HYDROCHLORIDE 10 MG: 10 TABLET ORAL at 17:51

## 2021-08-06 RX ADMIN — TACROLIMUS 4 MG: 1 CAPSULE ORAL at 04:46

## 2021-08-06 RX ADMIN — DOCUSATE SODIUM 100 MG: 100 CAPSULE, LIQUID FILLED ORAL at 04:47

## 2021-08-06 RX ADMIN — SERTRALINE HYDROCHLORIDE 100 MG: 100 TABLET ORAL at 04:46

## 2021-08-06 RX ADMIN — GABAPENTIN 100 MG: 100 CAPSULE ORAL at 17:50

## 2021-08-06 RX ADMIN — BUTALBITAL, ACETAMINOPHEN, AND CAFFEINE 1 TABLET: 50; 325; 40 TABLET ORAL at 01:17

## 2021-08-06 RX ADMIN — ONDANSETRON 4 MG: 2 INJECTION INTRAMUSCULAR; INTRAVENOUS at 11:21

## 2021-08-06 RX ADMIN — OMEPRAZOLE 20 MG: 20 CAPSULE, DELAYED RELEASE ORAL at 04:47

## 2021-08-06 RX ADMIN — DOCUSATE SODIUM 100 MG: 100 CAPSULE, LIQUID FILLED ORAL at 17:50

## 2021-08-06 RX ADMIN — LEVETIRACETAM 500 MG: 500 TABLET ORAL at 04:46

## 2021-08-06 RX ADMIN — GABAPENTIN 100 MG: 100 CAPSULE ORAL at 04:47

## 2021-08-06 RX ADMIN — TADALAFIL 20 MG: 20 TABLET ORAL at 21:00

## 2021-08-06 RX ADMIN — DOCUSATE SODIUM 50 MG AND SENNOSIDES 8.6 MG 1 TABLET: 8.6; 5 TABLET, FILM COATED ORAL at 21:18

## 2021-08-06 RX ADMIN — MYCOPHENOLATE MOFETIL 250 MG: 250 CAPSULE ORAL at 04:51

## 2021-08-06 RX ADMIN — OXYCODONE HYDROCHLORIDE 10 MG: 10 TABLET ORAL at 21:18

## 2021-08-06 RX ADMIN — OXYCODONE HYDROCHLORIDE 10 MG: 10 TABLET ORAL at 12:29

## 2021-08-06 RX ADMIN — OXYCODONE HYDROCHLORIDE 10 MG: 10 TABLET ORAL at 04:47

## 2021-08-06 ASSESSMENT — GAIT ASSESSMENTS
DEVIATION: BRADYKINETIC
DISTANCE (FEET): 100
GAIT LEVEL OF ASSIST: MINIMAL ASSIST
ASSISTIVE DEVICE: FRONT WHEEL WALKER

## 2021-08-06 ASSESSMENT — COGNITIVE AND FUNCTIONAL STATUS - GENERAL
CLIMB 3 TO 5 STEPS WITH RAILING: A LITTLE
MOBILITY SCORE: 18
WALKING IN HOSPITAL ROOM: A LITTLE
MOVING FROM LYING ON BACK TO SITTING ON SIDE OF FLAT BED: A LOT
MOVING TO AND FROM BED TO CHAIR: A LITTLE
STANDING UP FROM CHAIR USING ARMS: A LITTLE
SUGGESTED CMS G CODE MODIFIER MOBILITY: CK

## 2021-08-06 NOTE — THERAPY
"Physical Therapy   Daily Treatment     Patient Name: Roxana Cuba  Age:  61 y.o., Sex:  female  Medical Record #: [id number]  Today's Date: 8/6/2021     Precautions: Fall Risk    Assessment    Pt progressing as expected w/ therapy. Pt was more limited by L eye pain and swelling stating that it was throwing off her balance. Pt veering towards the L intermittently w/ ambulation. She was able to increase ambulation tolerance however states high fatigue quickly. Pt needing assist w/ O2 line management but states she has a system at home. Optimally, pt will benefit from HH to ensure safety w/ mobility upon DC.    Plan    Continue current treatment plan.    DC Equipment Recommendations: Front-Wheel Walker  Discharge Recommendations: Recommend home health for continued physical therapy services      Subjective    \"I was stuck in [place] for 2 years.\"     Objective       08/06/21 1044   Precautions   Precautions Fall Risk   Comments Hemovac in L temporal region   Balance   Sitting Balance (Static) Fair +   Sitting Balance (Dynamic) Fair   Standing Balance (Static) Fair   Standing Balance (Dynamic) Fair -   Weight Shift Sitting Fair   Weight Shift Standing Fair   Gait Analysis   Gait Level Of Assist Minimal Assist  (SBA)   Assistive Device Front Wheel Walker   Distance (Feet) 100   # of Times Distance was Traveled 1   Deviation Bradykinetic   Comments Pt veering to the L but claiming it was d/t the FWW. Pt stating quick fatigue w/ ambulation. Pt also stating balance off d/t new eye swelling.   Bed Mobility    Supine to Sit Supervised   Sit to Supine Supervised   Scooting Supervised   Rolling Supervised   Comments Extra time to perform. Pt needing verbal cues to sequence bed mobility to get feet on the ground.   Functional Mobility   Sit to Stand Supervised   Bed, Chair, Wheelchair Transfer Supervised   Transfer Method Other (Comments)  (Ambulating)   Mobility With FWW. Pt needing assist w/ O2 line management yet states no " difficulty at home from baseline O2 needs.   Short Term Goals    Short Term Goal # 1 pt will be able to perform sit<>stand/transfers with FWW wtih spv in 6tx to promtoe dc to home    Goal Outcome # 1 Progressing as expected   Short Term Goal # 2 pt will be able to ambulate 150ft with FWW with Spv in 6tx to promote dc to home plan   Goal Outcome # 2 Progressing as expected

## 2021-08-06 NOTE — THERAPY
Physical Therapy   Initial Evaluation     Patient Name: Roxana Cuba  Age:  61 y.o., Sex:  female  Medical Record #: [id number]  Today's Date: 8/5/2021     Precautions: Fall Risk (SDH s/p crani)    Assessment  Pt presents to PT s/p crani after subacute SDH. She was able to demonstrate short distance ambulation with HHA/min A. Anticipate she will progress well with increasing OOB activity given current presentation. Will continue to visit with details/recs below.     Plan    Recommend Physical Therapy 3 times per week until therapy goals are met     DC Equipment Recommendations: Unable to determine at this time  Discharge Recommendations: Recommend outpatient physical therapy services to address higher level deficits          08/05/21 1023   Prior Living Situation   Prior Services Home-Independent   Housing / Facility 1 Story Apartment / Condo   Steps Into Home 0   Steps In Home 0   Bathroom Set up Bathtub / Shower Combination;Shower Chair   Equipment Owned Tub / Shower Seat   Lives with - Patient's Self Care Capacity Spouse   Comments lives with spouse though his physical assistance is limiting; able to assist with IADls and shopping   Prior Level of Functional Mobility   Bed Mobility Independent   Transfer Status Independent   Ambulation Independent   Distance Ambulation (Feet)   (community)   Assistive Devices Used 4-Wheel Walker   Stairs Independent   Comments was recently using 4WW (though reports was confiscated at airport when returning from [facility]); reports prior to recent change in status, she was I with no AD    History of Falls   History of Falls Yes   Date of Last Fall   (fall 5 weeks prior)   Cognition    Cognition / Consciousness WDL   Level of Consciousness Alert   Comments very pleasant and cooperative   Passive ROM Lower Body   Passive ROM Lower Body WDL   Active ROM Lower Body    Active ROM Lower Body  WDL   Strength Lower Body   Lower Body Strength  WDL   Sensation Lower Body   Lower Extremity  Sensation   WDL   Balance Assessment   Sitting Balance (Static) Fair   Sitting Balance (Dynamic) Fair   Standing Balance (Static) Fair   Standing Balance (Dynamic) Fair -   Weight Shift Sitting Good   Weight Shift Standing Fair   Comments no conor LOB during standing/short distance ambulation; however guarded   Gait Analysis   Gait Level Of Assist   (CGA)   Assistive Device   (HHA)   Distance (Feet) 15   # of Times Distance was Traveled 1   Deviation Other (Comment)  (reciprocal gait; guarded mechanics; )   # of Stairs Climbed 0   Weight Bearing Status no restrictions   Comments see balance   Bed Mobility    Supine to Sit Supervised   Sit to Supine Supervised   Scooting Supervised   Functional Mobility   Sit to Stand Supervised   Bed, Chair, Wheelchair Transfer   (CGA/min A )   Transfer Method Stand Step   Mobility bed mobiltiy, EOB, sit<>stands, short distance with HHA   ICU Target Mobility Level   ICU Mobility - Targeted Level Level 4   How much difficulty does the patient currently have...   Turning over in bed (including adjusting bedclothes, sheets and blankets)? 4   Sitting down on and standing up from a chair with arms (e.g., wheelchair, bedside commode, etc.) 2   Moving from lying on back to sitting on the side of the bed? 3   How much help from another person does the patient currently need...   Moving to and from a bed to a chair (including a wheelchair)? 3   Need to walk in a hospital room? 3   Climbing 3-5 steps with a railing? 3   6 clicks Mobility Score 18   Activity Tolerance   Sitting in Chair NT   Sitting Edge of Bed at least 10 mins   Standing at least 3-4 mins   Comments no overt/acute fatigue   Edema / Skin Assessment   Edema / Skin  X   Comments drains in place   Patient / Family Goals    Patient / Family Goal #1 to go home   Short Term Goals    Short Term Goal # 1 pt will be able to perform sit<>stand/transfers with FWW wtih spv in 6tx to promtoe dc to home    Short Term Goal # 2 pt will be  able to ambulate 150ft with FWW with Spv in 6tx to promote dc to home plan

## 2021-08-06 NOTE — PROGRESS NOTES
Neurosurgery Progress Note    Subjective:  S/p fall with left SDH.  POD#2 left craniotomy for SDH  Reports HA this AM, tolerable.  CT yesterday stable.   Drain with 5 cc output last 8 hours.    Has h/o liver transplant    Exam:  A&O x3  Speech fluent and clear.   CN 2-12 grossly intact.  EOMI, PERRL.  Moves extremities x4.  Incision healing well.  Has some edema to left eye as expected.     BP  Min: 86/50  Max: 135/72  Pulse  Av.7  Min: 66  Max: 95  Resp  Av  Min: 12  Max: 26  Temp  Av.9 °C (98.5 °F)  Min: 36.6 °C (97.9 °F)  Max: 37.6 °C (99.6 °F)  SpO2  Av.5 %  Min: 90 %  Max: 95 %    No data recorded    Recent Labs     21  0430 21  0251   WBC 3.7* 6.3 4.8   RBC 3.37* 3.58* 3.28*   HEMOGLOBIN 10.2* 11.0* 10.1*   HEMATOCRIT 31.9* 33.9* 31.4*   MCV 94.7 94.7 95.7   MCH 30.3 30.7 30.8   MCHC 32.0* 32.4* 32.2*   RDW 60.1* 57.1* 59.7*   PLATELETCT 146* 164 153*   MPV 9.5 8.9* 9.0     Recent Labs     21  0445 21  0430 21  0251   SODIUM 140 143 142   POTASSIUM 3.0* 4.0 3.5*   CHLORIDE 108 110 109   CO2 26 25 25   GLUCOSE 109* 173* 103*   BUN 12 9 9   CREATININE 0.59 0.55 0.51   CALCIUM 7.6* 7.8* 7.9*     Recent Labs     21  1144 21  0251   APTT  --  26.6   INR 1.13  --      Recent Labs     21  1117   REACTMIN 4.2*   CLOTKINET 1.3   CLOTANGL 73.7   MAXCLOTS 54.0   PRCINADP 14.4   PRCINAA 41.3*       Intake/Output                       21 0700 - 08/0621 - 21 Total  Total                 Intake    P.O.  200  -- 200  --  -- --    P.O. 200 -- 200 -- -- --    I.V.  548  -- 548  --  -- --    Magnesium Sulfate Volume 50 -- 50 -- -- --    Volume (mL) (NS infusion) 498 -- 498 -- -- --    IV Piggyback  100  -- 100  --  -- --    Volume (mL) (ceFAZolin in dextrose (ANCEF) IVPB premix 2 g) 100 -- 100 -- -- --    Total Intake 848 -- 848 -- -- --       Output    Urine  50  --  50  --  -- --    Output (mL) ([REMOVED] Urethral Catheter Latex 16 Fr.) 50 -- 50 -- -- --    Drains  72  25 97  --  -- --    Output (mL) (Closed/Suction Drain 1 Left Other (Comment) Hemovac) 72 25 97 -- -- --    Total Output 122 25 147 -- -- --       Net I/O     726 -25 701 -- -- --            Intake/Output Summary (Last 24 hours) at 8/6/2021 0937  Last data filed at 8/6/2021 0500  Gross per 24 hour   Intake 682 ml   Output 122 ml   Net 560 ml            • oxyCODONE immediate-release  5 mg Q3HRS PRN    Or   • oxyCODONE immediate-release  10 mg Q3HRS PRN    Or   • HYDROmorphone  0.5 mg Q3HRS PRN   • levETIRAcetam  500 mg BID   • butalbital/apap/caffeine -40 mg  1 tablet Q6HRS PRN   • polyethylene glycol/lytes  1 Packet DAILY   • acetaminophen  650 mg Q6HRS PRN   • melatonin  5 mg Nightly   • Pharmacy Consult Request  1 Each PHARMACY TO DOSE   • MD ALERT...DO NOT ADMINISTER NSAIDS or ASPIRIN unless ORDERED By Neurosurgery  1 Each PRN   • ondansetron  4 mg Q4HRS PRN   • diphenhydrAMINE  25 mg Q6HRS PRN   • labetalol  10 mg Q HOUR PRN   • hydrALAZINE  10 mg Q HOUR PRN   • cloNIDine  0.1 mg Q4HRS PRN   • docusate sodium  100 mg BID   • senna-docusate  1 tablet Nightly   • senna-docusate  1 tablet Q24HRS PRN   • magnesium hydroxide  30 mL QDAY PRN   • bisacodyl  10 mg Q24HRS PRN   • fleet  1 Each Once PRN   • artificial tears  1 Application Q8HRS   • diphenhydrAMINE  25 mg Q6HRS PRN    Or   • diphenhydrAMINE  25 mg Q6HRS PRN   • benzocaine-menthol  1 Lozenge Q2HRS PRN   • ALPRAZolam  0.5 mg TID PRN   • fludrocortisone  0.5 mg DAILY   • gabapentin  100 mg BID   • levothyroxine  137 mcg AM ES   • pravastatin  20 mg DAILY   • sertraline  100 mg DAILY   • mycophenolate  250 mg BID   • omeprazole  20 mg DAILY   • tacrolimus  4 mg DAILY   • ambrisentan  10 mg DAILY   • tadalafil  20 mg QHS       Assessment and Plan:  Hospital day #4  POD #2  Ok for nursing to d/c drain today.   Ok for d/c home per NS when ok with  hospitalists.  May shower in 24 hours.  Needs f/u with [facility] in 2 weeks for staple removal, and does not need imaging for that appointment.  May reconsult NS as needed.       Prophylactic anticoagulation: no         Start date/time: two weeks.

## 2021-08-06 NOTE — FACE TO FACE
"Face to Face Note  -  Durable Medical Equipment    Deja Busch M.D. - NPI: [id number]  I certify that this patient is under my care and that they had a durable medical equipment(DME)face to face encounter by myself that meets the physician DME face-to-face encounter requirements with this patient on:    Date of encounter:   Patient:                    MRN:                       YOB: 2021  Roxana Cuba  [id number]  1960     The encounter with the patient was in whole, or in part, for the following medical condition, which is the primary reason for durable medical equipment:  COPD    I certify that, based on my findings, the following durable medical equipment is medically necessary:  Oxygen.    HOME O2 Saturation Measurements:(Values must be present for Home Oxygen orders)  Room air sat at rest: 87  Room air sat with amb: 77  With liters of O2: 3, O2 sat at rest with O2: 91  With Liters of O2: 3, O2 sat with amb with O2 : 90  Is the patient mobile?: Yes    My Clinical findings support the need for the above equipment due to:  Hypoxia    Supporting Symptoms: The patient requires supplemental oxygen, as the following interventions have been tried with limited or no improvement: \"Bronchodilators and/or steroid inhalers, \"Positive expiratory pressure therapies, \"Oral and/or IV steroids, \"Ambulation with oximetry and \"Incentive spirometry    If patient feels more short of breath, they can go up to 6 liters per minute and contact healthcare provider.  "

## 2021-08-06 NOTE — CARE PLAN
The patient is Stable - Low risk of patient condition declining or worsening    Shift Goals  Clinical Goals: maintain neuro status  Patient Goals: pain management  Family Goals: ADAM    Progress made toward(s) clinical / shift goals: Pt remained A&O x4 throughout shift. Oriented to room, call light within reach. Pt calls appropriately. Pain relieved with pharmacological interventions (see MAR) and additional nonpharmalogical interventions- pillows, repositioning, and rest.    Patient is not progressing towards the following goals:

## 2021-08-06 NOTE — CARE PLAN
The patient is Stable - Low risk of patient condition declining or worsening    Shift Goals  Clinical Goals: Comfort and pain control  Patient Goals: Pain control   Family Goals: ADAM    Progress made toward(s) clinical / shift goals:  Patient drain removed no complications. Neuro stable. Awaiting delivery of home oxygen for discharge.     Patient is not progressing towards the following goals:

## 2021-08-06 NOTE — DISCHARGE PLANNING
Care Transition Team Discharge Planning    Anticipated Discharge Disposition: Home - pending    Action: Lsw met with patient to complete CTT assessment. Patient presented A&Ox4. Patient resides with spouse who will provide transportation home. Patient reports being able to complete ADLs and IADLs independently. Pt reported needing some assistance with walking.     Lsw faxed CHOICE form to [employer].  Lsw placed CHOICE form in CM basket.    Walker was delivered to the room by [employer].  Lsw submitted O2 referral to [facility].  Lsw submitted HH referral to [facility], [facility] and [facility].    Barriers to Discharge: Awaiting for O2 orders    Plan: Lsw will assist medical team with DC home.    Care Transition Team Assessment    Information Source  Orientation Level: Oriented X4  Information Given By: Patient  Informant's Name:  (Roxana Cuba)  Who is responsible for making decisions for patient? : Patient    Readmission Evaluation  Is this a readmission?: No    Elopement Risk  Legal Hold: No  Ambulatory or Self Mobile in Wheelchair: Yes  Disoriented: No  Psychiatric Symptoms: None  History of Wandering: No  Elopement this Admit: No  Vocalizing Wanting to Leave: No  Displays Behaviors, Body Language Wanting to Leave: No-Not at Risk for Elopement  Elopement Risk: Not at Risk for Elopement    Interdisciplinary Discharge Planning  Lives with - Patient's Self Care Capacity: Spouse  Patient or legal guardian wants to designate a caregiver: No  Housing / Facility: 1 Story Apartment / Condo  Prior Services: Home-Independent    Discharge Preparedness  What is your plan after discharge?: Home with help  What are your discharge supports?: Spouse  Prior Functional Level: Ambulatory, Independent with Activities of Daily Living, Independent with Medication Management  Difficulity with ADLs: None  Difficulity with IADLs: None    Functional Assesment  Prior Functional Level: Ambulatory, Independent with Activities of Daily  Living, Independent with Medication Management    Finances  Financial Barriers to Discharge: No  Prescription Coverage: Yes    Vision / Hearing Impairment  Right Eye Vision: Impaired  Left Eye Vision: Impaired         Advance Directive  Advance Directive?: DPOA for Health Care    Domestic Abuse  Have you ever been the victim of abuse or violence?: No  Physical Abuse or Sexual Abuse: No  Verbal Abuse or Emotional Abuse: No  Possible Abuse/Neglect Reported to:: Not Applicable    Psychological Assessment  History of Substance Abuse: None  History of Psychiatric Problems: No  Non-compliant with Treatment: No  Newly Diagnosed Illness: Yes    Discharge Risks or Barriers  Discharge risks or barriers?: No  Patient risk factors: Readmission    Anticipated Discharge Information  Discharge Disposition: Still a Patient (30)  Discharge Address:  ([address] [place] [address], [place] [id number])  Discharge Contact Phone Number: [phone]

## 2021-08-06 NOTE — DISCHARGE PLANNING
Received Choice form at 0715  Agency/Facility Name: [facility]  Referral sent per Choice form @ 2713

## 2021-08-06 NOTE — DISCHARGE PLANNING
[facility] to accept this referral pending patient's completion of appointment to re-establish with Elisa Phillip M.D. scheduled for 08/09/21     Thank you!

## 2021-08-06 NOTE — FACE TO FACE
Face to Face Supporting Documentation - Home Health    The encounter with this patient was in whole or in part the primary reason for home health admission.    Date of encounter:   Patient:                    MRN:                       YOB: 2021  Roxana Cuba  [id number]  1960     Home health to see patient for:  Skilled Nursing care for assessment, interventions & education and Physical Therapy evaluation and treatment    Skilled need for:  Surgical Aftercare Traumatic subdural hematoma without loss of consciousness  and New Onset Medical Diagnosis Traumatic subdural hematoma without loss of consciousness (    Skilled nursing interventions to include:  Comment: physical therapy    Homebound status evidenced by:  Need the aid of supportive devices such as crutches, canes, wheelchairs or walkers, Require the use of special transportation or Needs the assistance of another person in order to leave the home. Leaving home requires a considerable and taxing effort. There is a normal inability to leave the home.    Community Physician to provide follow up care: Elisa Phillip M.D.     Optional Interventions? No      I certify the face to face encounter for this home health care referral meets the CMS requirements and the encounter/clinical assessment with the patient was, in whole, or in part, for the medical condition(s) listed above, which is the primary reason for home health care. Based on my clinical findings: the service(s) are medically necessary, support the need for home health care, and the homebound criteria are met.  I certify that this patient has had a face to face encounter by myself.  Deja Busch M.D. - NPI: [id number]

## 2021-08-06 NOTE — DISCHARGE PLANNING
Received Choice form at 1504  Agency/Facility Name: [facility]  Referral sent per Choice form @ 0845

## 2021-08-07 ENCOUNTER — APPOINTMENT (OUTPATIENT)
Dept: RADIOLOGY | Facility: MEDICAL CENTER | Age: 61
DRG: 026 | End: 2021-08-07
Attending: STUDENT IN AN ORGANIZED HEALTH CARE EDUCATION/TRAINING PROGRAM
Payer: MEDICARE

## 2021-08-07 ENCOUNTER — APPOINTMENT (OUTPATIENT)
Dept: RADIOLOGY | Facility: MEDICAL CENTER | Age: 61
DRG: 026 | End: 2021-08-07
Attending: PHYSICIAN ASSISTANT
Payer: MEDICARE

## 2021-08-07 PROBLEM — H57.89 EYE SWELLING, LEFT: Status: ACTIVE | Noted: 2021-08-07

## 2021-08-07 LAB
ANION GAP SERPL CALC-SCNC: 7 MMOL/L (ref 7–16)
BUN SERPL-MCNC: 6 MG/DL (ref 8–22)
CALCIUM SERPL-MCNC: 7.9 MG/DL (ref 8.5–10.5)
CHLORIDE SERPL-SCNC: 104 MMOL/L (ref 96–112)
CO2 SERPL-SCNC: 28 MMOL/L (ref 20–33)
CREAT SERPL-MCNC: 0.37 MG/DL (ref 0.5–1.4)
ERYTHROCYTE [DISTWIDTH] IN BLOOD BY AUTOMATED COUNT: 60.4 FL (ref 35.9–50)
GLUCOSE SERPL-MCNC: 100 MG/DL (ref 65–99)
HCT VFR BLD AUTO: 29.1 % (ref 37–47)
HGB BLD-MCNC: 9.1 G/DL (ref 12–16)
MAGNESIUM SERPL-MCNC: 1.6 MG/DL (ref 1.5–2.5)
MCH RBC QN AUTO: 30.5 PG (ref 27–33)
MCHC RBC AUTO-ENTMCNC: 31.3 G/DL (ref 33.6–35)
MCV RBC AUTO: 97.7 FL (ref 81.4–97.8)
PHOSPHATE SERPL-MCNC: 2.1 MG/DL (ref 2.5–4.5)
PLATELET # BLD AUTO: 133 K/UL (ref 164–446)
PMV BLD AUTO: 9.6 FL (ref 9–12.9)
POTASSIUM SERPL-SCNC: 3.9 MMOL/L (ref 3.6–5.5)
RBC # BLD AUTO: 2.98 M/UL (ref 4.2–5.4)
SODIUM SERPL-SCNC: 139 MMOL/L (ref 135–145)
WBC # BLD AUTO: 4.8 K/UL (ref 4.8–10.8)

## 2021-08-07 PROCEDURE — 700111 HCHG RX REV CODE 636 W/ 250 OVERRIDE (IP): Performed by: HOSPITALIST

## 2021-08-07 PROCEDURE — 80048 BASIC METABOLIC PNL TOTAL CA: CPT

## 2021-08-07 PROCEDURE — 700111 HCHG RX REV CODE 636 W/ 250 OVERRIDE (IP): Performed by: PHYSICIAN ASSISTANT

## 2021-08-07 PROCEDURE — 84100 ASSAY OF PHOSPHORUS: CPT

## 2021-08-07 PROCEDURE — 70450 CT HEAD/BRAIN W/O DYE: CPT | Mod: MG

## 2021-08-07 PROCEDURE — A9270 NON-COVERED ITEM OR SERVICE: HCPCS | Performed by: STUDENT IN AN ORGANIZED HEALTH CARE EDUCATION/TRAINING PROGRAM

## 2021-08-07 PROCEDURE — 99291 CRITICAL CARE FIRST HOUR: CPT | Performed by: HOSPITALIST

## 2021-08-07 PROCEDURE — A9270 NON-COVERED ITEM OR SERVICE: HCPCS | Performed by: HOSPITALIST

## 2021-08-07 PROCEDURE — A9270 NON-COVERED ITEM OR SERVICE: HCPCS | Performed by: PHYSICIAN ASSISTANT

## 2021-08-07 PROCEDURE — 700111 HCHG RX REV CODE 636 W/ 250 OVERRIDE (IP): Performed by: INTERNAL MEDICINE

## 2021-08-07 PROCEDURE — 700102 HCHG RX REV CODE 250 W/ 637 OVERRIDE(OP): Performed by: STUDENT IN AN ORGANIZED HEALTH CARE EDUCATION/TRAINING PROGRAM

## 2021-08-07 PROCEDURE — 85027 COMPLETE CBC AUTOMATED: CPT

## 2021-08-07 PROCEDURE — 700111 HCHG RX REV CODE 636 W/ 250 OVERRIDE (IP): Performed by: STUDENT IN AN ORGANIZED HEALTH CARE EDUCATION/TRAINING PROGRAM

## 2021-08-07 PROCEDURE — 92610 EVALUATE SWALLOWING FUNCTION: CPT

## 2021-08-07 PROCEDURE — 770022 HCHG ROOM/CARE - ICU (200)

## 2021-08-07 PROCEDURE — 700102 HCHG RX REV CODE 250 W/ 637 OVERRIDE(OP): Performed by: HOSPITALIST

## 2021-08-07 PROCEDURE — 83735 ASSAY OF MAGNESIUM: CPT

## 2021-08-07 PROCEDURE — 700102 HCHG RX REV CODE 250 W/ 637 OVERRIDE(OP): Performed by: PHYSICIAN ASSISTANT

## 2021-08-07 RX ORDER — LEVETIRACETAM 10 MG/ML
1000 INJECTION INTRAVASCULAR EVERY 12 HOURS
Status: DISCONTINUED | OUTPATIENT
Start: 2021-08-07 | End: 2021-08-09

## 2021-08-07 RX ORDER — DIPHENHYDRAMINE HCL 25 MG
25 TABLET ORAL EVERY 6 HOURS PRN
Status: DISCONTINUED | OUTPATIENT
Start: 2021-08-07 | End: 2021-08-14

## 2021-08-07 RX ORDER — LEVOTHYROXINE SODIUM 137 UG/1
137 TABLET ORAL
Status: DISCONTINUED | OUTPATIENT
Start: 2021-08-08 | End: 2021-08-14

## 2021-08-07 RX ORDER — ALPRAZOLAM 0.5 MG/1
0.5 TABLET ORAL 3 TIMES DAILY PRN
Status: DISCONTINUED | OUTPATIENT
Start: 2021-08-07 | End: 2021-08-14

## 2021-08-07 RX ORDER — AMOXICILLIN 250 MG
1 CAPSULE ORAL NIGHTLY
Status: DISCONTINUED | OUTPATIENT
Start: 2021-08-07 | End: 2021-08-14

## 2021-08-07 RX ORDER — LEVETIRACETAM 5 MG/ML
500 INJECTION INTRAVASCULAR EVERY 12 HOURS
Status: DISCONTINUED | OUTPATIENT
Start: 2021-08-07 | End: 2021-08-07

## 2021-08-07 RX ORDER — POLYETHYLENE GLYCOL 3350 17 G/17G
1 POWDER, FOR SOLUTION ORAL DAILY
Status: DISCONTINUED | OUTPATIENT
Start: 2021-08-08 | End: 2021-08-14

## 2021-08-07 RX ORDER — CHOLECALCIFEROL (VITAMIN D3) 125 MCG
5 CAPSULE ORAL NIGHTLY
Status: DISCONTINUED | OUTPATIENT
Start: 2021-08-07 | End: 2021-08-14

## 2021-08-07 RX ORDER — AMOXICILLIN 250 MG
1 CAPSULE ORAL
Status: DISCONTINUED | OUTPATIENT
Start: 2021-08-07 | End: 2021-08-14

## 2021-08-07 RX ORDER — HYDROMORPHONE HYDROCHLORIDE 1 MG/ML
0.5 INJECTION, SOLUTION INTRAMUSCULAR; INTRAVENOUS; SUBCUTANEOUS
Status: DISCONTINUED | OUTPATIENT
Start: 2021-08-07 | End: 2021-08-09

## 2021-08-07 RX ORDER — DOCUSATE SODIUM 50 MG/5ML
100 LIQUID ORAL 2 TIMES DAILY
Status: DISCONTINUED | OUTPATIENT
Start: 2021-08-07 | End: 2021-08-17 | Stop reason: HOSPADM

## 2021-08-07 RX ORDER — CLONIDINE HYDROCHLORIDE 0.1 MG/1
0.1 TABLET ORAL EVERY 4 HOURS PRN
Status: DISCONTINUED | OUTPATIENT
Start: 2021-08-07 | End: 2021-08-14

## 2021-08-07 RX ORDER — MYCOPHENOLATE MOFETIL 200 MG/ML
250 POWDER, FOR SUSPENSION ORAL 2 TIMES DAILY
Status: DISCONTINUED | OUTPATIENT
Start: 2021-08-07 | End: 2021-08-14

## 2021-08-07 RX ORDER — DIPHENHYDRAMINE HYDROCHLORIDE 50 MG/ML
25 INJECTION INTRAMUSCULAR; INTRAVENOUS EVERY 6 HOURS PRN
Status: DISCONTINUED | OUTPATIENT
Start: 2021-08-07 | End: 2021-08-14

## 2021-08-07 RX ORDER — FLUDROCORTISONE ACETATE 0.1 MG/1
0.5 TABLET ORAL DAILY
Status: DISCONTINUED | OUTPATIENT
Start: 2021-08-08 | End: 2021-08-14

## 2021-08-07 RX ORDER — PRAVASTATIN SODIUM 20 MG
20 TABLET ORAL DAILY
Status: DISCONTINUED | OUTPATIENT
Start: 2021-08-08 | End: 2021-08-14

## 2021-08-07 RX ORDER — OXYCODONE HYDROCHLORIDE 10 MG/1
10 TABLET ORAL
Status: DISCONTINUED | OUTPATIENT
Start: 2021-08-07 | End: 2021-08-14

## 2021-08-07 RX ORDER — ACETAMINOPHEN 325 MG/1
650 TABLET ORAL EVERY 6 HOURS PRN
Status: DISCONTINUED | OUTPATIENT
Start: 2021-08-07 | End: 2021-08-14

## 2021-08-07 RX ORDER — OXYCODONE HYDROCHLORIDE 5 MG/1
5 TABLET ORAL
Status: DISCONTINUED | OUTPATIENT
Start: 2021-08-07 | End: 2021-08-14

## 2021-08-07 RX ORDER — MAGNESIUM SULFATE HEPTAHYDRATE 40 MG/ML
2 INJECTION, SOLUTION INTRAVENOUS ONCE
Status: COMPLETED | OUTPATIENT
Start: 2021-08-07 | End: 2021-08-07

## 2021-08-07 RX ORDER — GABAPENTIN 100 MG/1
100 CAPSULE ORAL 2 TIMES DAILY
Status: DISCONTINUED | OUTPATIENT
Start: 2021-08-08 | End: 2021-08-14

## 2021-08-07 RX ORDER — SERTRALINE HYDROCHLORIDE 100 MG/1
100 TABLET, FILM COATED ORAL DAILY
Status: DISCONTINUED | OUTPATIENT
Start: 2021-08-08 | End: 2021-08-14

## 2021-08-07 RX ORDER — AMBRISENTAN 10 MG/1
10 TABLET, FILM COATED ORAL DAILY
Status: DISCONTINUED | OUTPATIENT
Start: 2021-08-08 | End: 2021-08-14

## 2021-08-07 RX ORDER — BUTALBITAL, ACETAMINOPHEN AND CAFFEINE 50; 325; 40 MG/1; MG/1; MG/1
1 TABLET ORAL EVERY 6 HOURS PRN
Status: DISCONTINUED | OUTPATIENT
Start: 2021-08-07 | End: 2021-08-14

## 2021-08-07 RX ADMIN — MINERAL OIL, PETROLATUM 1 APPLICATION: 425; 573 OINTMENT OPHTHALMIC at 14:00

## 2021-08-07 RX ADMIN — SILDENAFIL CITRATE 20 MG: 25 TABLET, FILM COATED ORAL at 21:29

## 2021-08-07 RX ADMIN — OXYCODONE HYDROCHLORIDE 10 MG: 10 TABLET ORAL at 23:59

## 2021-08-07 RX ADMIN — CLONIDINE HYDROCHLORIDE 0.1 MG: 0.1 TABLET ORAL at 23:13

## 2021-08-07 RX ADMIN — DOCUSATE SODIUM 50 MG AND SENNOSIDES 8.6 MG 1 TABLET: 8.6; 5 TABLET, FILM COATED ORAL at 21:30

## 2021-08-07 RX ADMIN — HYDROMORPHONE HYDROCHLORIDE 0.5 MG: 1 INJECTION, SOLUTION INTRAMUSCULAR; INTRAVENOUS; SUBCUTANEOUS at 20:34

## 2021-08-07 RX ADMIN — MYCOPHENOLATE MOFETIL 250 MG: 200 POWDER, FOR SUSPENSION ORAL at 21:29

## 2021-08-07 RX ADMIN — LEVETIRACETAM INJECTION 500 MG: 5 INJECTION INTRAVENOUS at 05:32

## 2021-08-07 RX ADMIN — DIBASIC SODIUM PHOSPHATE, MONOBASIC POTASSIUM PHOSPHATE AND MONOBASIC SODIUM PHOSPHATE 250 MG: 852; 155; 130 TABLET ORAL at 23:13

## 2021-08-07 RX ADMIN — TADALAFIL 20 MG: 20 TABLET ORAL at 21:00

## 2021-08-07 RX ADMIN — Medication 5 MG: at 21:30

## 2021-08-07 RX ADMIN — LEVETIRACETAM 1000 MG: 10 INJECTION INTRAVENOUS at 18:00

## 2021-08-07 RX ADMIN — MINERAL OIL, PETROLATUM 1 APPLICATION: 425; 573 OINTMENT OPHTHALMIC at 21:42

## 2021-08-07 RX ADMIN — TACROLIMUS 4 MG: 5 CAPSULE ORAL at 21:30

## 2021-08-07 RX ADMIN — OXYCODONE HYDROCHLORIDE 10 MG: 10 TABLET ORAL at 00:41

## 2021-08-07 RX ADMIN — HYDROMORPHONE HYDROCHLORIDE 0.5 MG: 1 INJECTION, SOLUTION INTRAMUSCULAR; INTRAVENOUS; SUBCUTANEOUS at 15:32

## 2021-08-07 RX ADMIN — HYDRALAZINE HYDROCHLORIDE 10 MG: 20 INJECTION INTRAMUSCULAR; INTRAVENOUS at 20:44

## 2021-08-07 RX ADMIN — MAGNESIUM SULFATE 2 G: 2 INJECTION INTRAVENOUS at 12:30

## 2021-08-07 RX ADMIN — ACETAMINOPHEN 650 MG: 325 TABLET, FILM COATED ORAL at 21:32

## 2021-08-07 ASSESSMENT — ENCOUNTER SYMPTOMS
SHORTNESS OF BREATH: 0
FEVER: 0
NECK PAIN: 1
DIARRHEA: 0
ABDOMINAL PAIN: 0
CHILLS: 0
EYE PAIN: 1
COUGH: 0
SORE THROAT: 0
HEADACHES: 1
DOUBLE VISION: 1

## 2021-08-07 NOTE — PROGRESS NOTES
Pt noted to have intermittent confusion and slurred speech. No other neuro changes other than continuing headache. Hospitalist notified. STAT head CT ordered. Pt taken down to CT with this RN and charge RN. Will continue to monitor.

## 2021-08-07 NOTE — PROGRESS NOTES
"Hospital Medicine Daily Progress Note    Date of Service  8/7/2021    Chief Complaint  Roxana Cuba is a 61 y.o. female admitted 8/3/2021 with SDH    Hospital Course  Roxana Cuba is a 61 y.o. female with complex PMHx significant for COPD, chronic hypoxemic respiratory failure on 3L NC, pulmonary HTN, liver transplantation in 2011 for etoh cirrhosis, granulomatous hepatitis/sarcoid, Evon-en-Y gastric bypass 2018, MGUS, CKD, ?adrenal insufficiency, hypothyroidism who presented 8/3/2021 with worsening HA and visual changes x4 weeks after being diagnosed with a SDH secondary to ground level, mechanical fall 5 weeks ago while she was in [facility]. Patient also had laparotomy for \"twisting of the bowel\" in [facility] 5 weeks ago. No prior imaging available, but MRI done here reveals 29 mm subacute/chronic L SDH and SAH adjacent to L frontal/temporal lobes with 6 mm L->R shift. NSG was consulted and brought the patient to the OR and performed left craniotomy for a large subdural hematoma with membrane formation. Subgaleal drain was placed. Patient did have some hypotension in the ICU but this resolved. Her magnesium was repleted. Thus she was able to be transferred out to the neurosurgical floor. She had some constipation so her MiraLAX was scheduled. Discontinued maintenance IVF.  She was preparing to discharge home with home health and oxygen when she developed intermittent confusion and slurred speech overnight so repeat head CT showed increase in SDH to 14 mm and 2 mm left to right midline shift.  Patient was transferred back to the ICU per neurosurgery.    Interval Problem Update  8/5: Internal medicine consulted and transferred out of ICU  8/6: Stable and preparing for discharge.  However, overnight, patient developed intermittent confusion and slurred speech.  Head CT ordered.  8/7: Patient is critically ill.   The patient continues to have: Worsening subdural hematoma  The vital organ system that is affected is " the: Brain  If untreated there is a high chance of deterioration into: Brain death  And eventually death.   The critical care that I am providing today is: Close and careful monitoring, Head CT showed that the SDH had now increased to 14 mm in width.  Repeat head CT stable.  Patient became aphasic however.  Transferred to ICU for close monitoring.  I discussed with  at bedside.  The critical that has been undertaken is medically complex.   There has been no overlap in critical care time.   Critical Care Time not including procedures: 39 mins    I have personally seen and examined the patient at bedside. I discussed the plan of care with patient, family and bedside RN.    Consultants/Specialty  Neurosurgeon Dr. Arnold  Critical care Dr. Flores  Internal medicine Dr. SRINATH Odell    Code Status  Full Code    Disposition  Patient is not medically cleared.   Anticipate discharge to - pending transfer out of the ICU.  I have placed the appropriate orders for post-discharge needs.    Review of Systems  Review of Systems   Unable to perform ROS: Patient nonverbal        Physical Exam  Temp:  [36.9 °C (98.5 °F)-37.4 °C (99.4 °F)] 37.4 °C (99.4 °F)  Pulse:  [71-75] 75  Resp:  [16-18] 18  BP: (103-131)/(51-72) 131/72  SpO2:  [91 %-96 %] 96 %    Physical Exam  Constitutional:       Appearance: She is well-developed.   HENT:      Head: Normocephalic.   Cardiovascular:      Rate and Rhythm: Normal rate.      Heart sounds: No gallop.    Pulmonary:      Effort: No respiratory distress.      Breath sounds: No wheezing.   Abdominal:      General: There is no distension.      Tenderness: There is no abdominal tenderness.   Skin:     General: Skin is warm.      Comments: Head staples in place CDI   Neurological:      Mental Status: She is alert.      Motor: Weakness present.      Comments: Nonverbal, unable to assess   Psychiatric:      Comments: Nonverbal, unable to assess         Fluids    Intake/Output Summary (Last 24 hours) at  8/7/2021 1026  Last data filed at 8/7/2021 0900  Gross per 24 hour   Intake 0 ml   Output 0 ml   Net 0 ml       Laboratory  Recent Labs     08/05/21  0430 08/06/21  0251 08/07/21  0523   WBC 6.3 4.8 4.8   RBC 3.58* 3.28* 2.98*   HEMOGLOBIN 11.0* 10.1* 9.1*   HEMATOCRIT 33.9* 31.4* 29.1*   MCV 94.7 95.7 97.7   MCH 30.7 30.8 30.5   MCHC 32.4* 32.2* 31.3*   RDW 57.1* 59.7* 60.4*   PLATELETCT 164 153* 133*   MPV 8.9* 9.0 9.6     Recent Labs     08/05/21 0430 08/06/21 0251 08/07/21  0523   SODIUM 143 142 139   POTASSIUM 4.0 3.5* 3.9   CHLORIDE 110 109 104   CO2 25 25 28   GLUCOSE 173* 103* 100*   BUN 9 9 6*   CREATININE 0.55 0.51 0.37*   CALCIUM 7.8* 7.9* 7.9*     Recent Labs     08/06/21  0251   APTT 26.6               Imaging  CT-HEAD W/O   Final Result      1.  Again seen left-sided holohemispheric subdural hemorrhage with mixed density components and gas within overlying craniotomy. This measures 14 mm in depth and is unchanged.      2.  Again seen atrophy.      CT-HEAD W/O   Final Result      1.  Interval increase in size in left frontal subdural hematoma measuring up to 14 mm in width with mass effect and midline shift from left to right of 2 mm.      2.  This was discussed with Physician: MAIDA PRITCHARD at 12:50 AM.      EC-ECHOCARDIOGRAM COMPLETE W/O CONT   Final Result      CT-HEAD W/O   Final Result      1.  Status post left frontal temporal craniotomy with evacuation of left subdural hematoma. Minimal residual hemorrhage and air is present in the left subdural space.      2.  No residual left to right midline shift.      3.  Underlying atrophy and small vessel ischemic changes again noted.      CT-HEAD W/O   Final Result      1.  Stable appearance of left frontal subdural hematoma with residual 7.5 mm left-to-right midline shift.      2.  No new hemorrhage.      3.  No herniation.      DX-CHEST-PORTABLE (1 VIEW)   Final Result      1.  Left basilar opacity may represent atelectasis or consolidation.   2.   Small left pleural effusion may be present.   3.  Atherosclerotic plaque.      MR-BRAIN-W/O   Final Result   Addendum 1 of 1   Findings were discussed with ROBERT BRYANT on 8/3/2021 1:44 PM.      Final      1.  There is subacute/chronic subdural and subarachnoid hemorrhages adjacent to the left frontal and temporal lobes. The maximum transverse dimension measures an approximately 29 mm. There is an approximately 6 mm midline shift towards right side.   2.  Mild cerebral volume loss.   3.  Mild chronic microvascular ischemic disease.      CT-HEAD W/O   Final Result      23 mm thick mixed density left frontotemporal convexity subdural hematoma with mass effect including 8 mm rightward midline shift/subfalcine herniation.      These findings were discussed with ROBERT BRYANT on 8/3/2021 9:01 AM.      DX-ABDOMEN COMPLETE WITH AP OR PA CXR   Final Result      1.  Small left pleural effusion.   2.  Moderate amount of colonic stool.   3.  No evidence of bowel obstruction.   4.  Scoliosis.           Assessment/Plan  * Traumatic subdural hematoma without loss of consciousness (HCC)- (present on admission)  Assessment & Plan  Secondary to GLF 5 weeks ago.   MRI 8/3: 29 mm L SDH w/ 6 mm L->R shift.   8/4 L craniotomy with subgaleal drain  neurochecks  levetiracetam for seizure ppx, hold dvt ppx.  Drain removed by neurosurgery   On 8/7 developed worsening SDH confirmed on imaging along with slurred speech and then aphasia  Transferred back to ICU 8/7 for close monitoring    Eye swelling, left- (present on admission)  Assessment & Plan  From recent surgery   Ice   No need for antibiotic     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HCC)- (present on admission)  Assessment & Plan  Continue fludrocortisone  With some hypotension in ICU  Monitor blood pressures    GERD (gastroesophageal reflux disease)- (present on admission)  Assessment & Plan  Continue PPI    Pulmonary hypertension (HCC)- (present on admission)  Assessment & Plan  Chronic  Not in  acute RHF.   Echo unremarkable and unchanged from September 2018. RSVP 35 mmHg  Continue home ambrisentan and tadalafil.    Chronic respiratory failure with hypoxia (HCC)- (present on admission)  Assessment & Plan  CXR showed L basilar opacity likely atelectasis. IS.   Home oxygen arrangement     Acquired hypothyroidism- (present on admission)  Assessment & Plan  Continue levothyroxine    Status post liver transplant Dr. Canada ([facility])- (present on admission)  Overview  -December 2011: Status post liver transplant for end stage liver disease at [facility], followed by Dr. Canada.        Assessment & Plan  For alcoholic cirrhosis  Continue home tacrolimus and MMF       VTE prophylaxis: SCDs/TEDs    I have performed a physical exam and reviewed and updated ROS and Plan today (8/7/2021). In review of yesterday's note (8/6/2021), there are no changes except as documented above.

## 2021-08-07 NOTE — PROGRESS NOTES
Patient having intermittent confusion and slurred speech. Patient complaining of headache CT head w/o ordered stat showing increase in left frontal subdural hemaotma now 14 mm in width with mass effect, 2 mm midline shift from left to right. Neurosurgery paged awaiting reply. Patient is currently resting, able to follow commands and has no focal weakness.     Addendum:   Discussed with Dr. Hunter, will continue to monitor for now with frequent neuro-checks patient will be seen on rounds.

## 2021-08-07 NOTE — PROGRESS NOTES
Neurosurgery Progress Note    Subjective:  S/p fall with left SDH.  POD#3 left craniotomy for SDH    Developed dysarthria and expressive aphasia overnight  Repeat head CT at [facility] showed interval increase in size of left frontal SDH measuring 14 mm.  2mm left to right midline shift  Not on anticoagulation      Has h/o liver transplant  Na 139, Plt 133    Exam:  Awake and alert  Expressive aphasia, essentially non verbal on exam but appears frustrated  No pronator drift  EOMI, PERRL.  Moves extremities x4.  Incision intact  Has some edema to left eye as expected.     BP  Min: 103/51  Max: 131/72  Pulse  Av.4  Min: 71  Max: 75  Resp  Av.4  Min: 16  Max: 18  Temp  Av.2 °C (98.9 °F)  Min: 36.9 °C (98.5 °F)  Max: 37.4 °C (99.4 °F)  SpO2  Av.4 %  Min: 91 %  Max: 96 %    No data recorded    Recent Labs     21  0523   WBC 6.3 4.8 4.8   RBC 3.58* 3.28* 2.98*   HEMOGLOBIN 11.0* 10.1* 9.1*   HEMATOCRIT 33.9* 31.4* 29.1*   MCV 94.7 95.7 97.7   MCH 30.7 30.8 30.5   MCHC 32.4* 32.2* 31.3*   RDW 57.1* 59.7* 60.4*   PLATELETCT 164 153* 133*   MPV 8.9* 9.0 9.6     Recent Labs     21  04321  02521  0523   SODIUM 143 142 139   POTASSIUM 4.0 3.5* 3.9   CHLORIDE 110 109 104   CO2 25 25 28   GLUCOSE 173* 103* 100*   BUN 9 9 6*   CREATININE 0.55 0.51 0.37*   CALCIUM 7.8* 7.9* 7.9*     Recent Labs     21  025   APTT 26.6           Intake/Output                       21 - 2159 21 -  Total [id number] Total                 Intake    P.O.  --  -- --  0  -- 0    P.O. -- -- -- 0 -- 0    Total Intake -- -- -- 0 -- 0       Output    Urine  --  -- --  --  -- --    Number of Times Voided 1 x 2 x 3 x -- -- --    Drains  0  -- 0  --  -- --    Output (mL) ([REMOVED] Closed/Suction Drain 1 Left Other (Comment) Hemovac) 0 -- 0 -- -- --    Total Output 0 -- 0 -- -- --       Net I/O     0 -- 0 0 -- 0             Intake/Output Summary (Last 24 hours) at 8/7/2021 1001  Last data filed at 8/7/2021 0900  Gross per 24 hour   Intake 0 ml   Output 0 ml   Net 0 ml            • levETIRAcetam (Keppra) IV  1,000 mg Q12HRS   • oxyCODONE immediate-release  5 mg Q3HRS PRN    Or   • oxyCODONE immediate-release  10 mg Q3HRS PRN    Or   • HYDROmorphone  0.5 mg Q3HRS PRN   • butalbital/apap/caffeine -40 mg  1 tablet Q6HRS PRN   • polyethylene glycol/lytes  1 Packet DAILY   • acetaminophen  650 mg Q6HRS PRN   • melatonin  5 mg Nightly   • Pharmacy Consult Request  1 Each PHARMACY TO DOSE   • MD ALERT...DO NOT ADMINISTER NSAIDS or ASPIRIN unless ORDERED By Neurosurgery  1 Each PRN   • ondansetron  4 mg Q4HRS PRN   • diphenhydrAMINE  25 mg Q6HRS PRN   • labetalol  10 mg Q HOUR PRN   • hydrALAZINE  10 mg Q HOUR PRN   • cloNIDine  0.1 mg Q4HRS PRN   • docusate sodium  100 mg BID   • senna-docusate  1 tablet Nightly   • senna-docusate  1 tablet Q24HRS PRN   • magnesium hydroxide  30 mL QDAY PRN   • bisacodyl  10 mg Q24HRS PRN   • fleet  1 Each Once PRN   • artificial tears  1 Application Q8HRS   • diphenhydrAMINE  25 mg Q6HRS PRN    Or   • diphenhydrAMINE  25 mg Q6HRS PRN   • benzocaine-menthol  1 Lozenge Q2HRS PRN   • ALPRAZolam  0.5 mg TID PRN   • fludrocortisone  0.5 mg DAILY   • gabapentin  100 mg BID   • levothyroxine  137 mcg AM ES   • pravastatin  20 mg DAILY   • sertraline  100 mg DAILY   • mycophenolate  250 mg BID   • omeprazole  20 mg DAILY   • tacrolimus  4 mg DAILY   • ambrisentan  10 mg DAILY   • tadalafil  20 mg QHS       Assessment and Plan:  POD #3  Developed dysarthria and expressive aphasia overnight  Repeat head CT with larger left SDH with only 2mm MLS  D/w Dr. Fuller who reviewed imaging  Dr. Fuller recommends the following:  - transfer to ICU  -Q 1 hour neuro checks  -NPO  -Increase keppra to 1000mg bid  -Repeat head CT at noon today  Will repeat coags    Discussed with intensivist and   Abdunnur    Prophylactic anticoagulation: no         Start date/time: two weeks.

## 2021-08-07 NOTE — THERAPY
"Speech Language Pathology   Initial Assessment     Patient Name: Roxana Cuba  AGE:  61 y.o., SEX:  female  Medical Record #: [id number]  Today's Date: 8/7/2021     Precautions  Precautions: Fall Risk, Swallow Precautions ( See Comments)  Comments: Hemovac in L temporal region    Assessment  Patient is 61 y.o. female admitted 8/3 s/p fall with left SDH. S/p left craniotomy for SDH. Per notes, pt had intermittent confusion and slurred speech overnight, pt c/o headache. CT head w/o ordered state showing increase in left frontal subdural hematoma, now 14 mm in width with mass effect, 2 mm midline shift from right to left. Awaiting neurosurgery recommendations. PMHx: cardiomegaly, cirrhosis, chronic kidney disease, diabetes, GERD, high cholesterol, hypothyroidism, hemorrhagic disorder, aortic valve sclerosis, splenomegaly, VRE.     Clinical swallow evaluation completed this date. Pt appearing somewhat agitated, but initially agreeable to PO. Per RN, pt was able to follow all commands this morning. Comprehensive oral mechanism exam unable to be completed d/t pt with difficulty following commands. Unable to sustain prolonged vowel.  Yes/no questions unreliable. She did produce some verbalizations; when asked if she wanted ice chips she said \"Please.\" Writing did not facilitate increased communication and she appeared frustrated with SLP attempts to aide in communication strategies. Single ice chips x 3 resulted in absent hyolaryngeal elevation upon palpation with first 2 trials, and complete hyolaryngeal elevation palpated on third trial. Unable to assess vocal quality. Attempted additional PO but pt declined, and attempting to get out of bed. Also, pt required max support to strip ice chip from spoon. At this time, d/t poor pt participation and difficulty following commands, cannot recommend PO diet. Recommend NPO pending re-evaluation Sunday, as pt is appropriate. SLP to follow.     Plan  1. NPO pending re-evaluation " Sunday, as pt is appropriate    Recommend Speech Therapy 5 times per week until therapy goals are met for the following treatments:  Dysphagia Training.    Discharge Recommendations: Recommend post-acute placement for additional speech therapy services prior to discharge home       Objective     08/07/21 0952   Precautions   Precautions Fall Risk;Swallow Precautions ( See Comments)   Oral Motor Eval    Is Patient Able to Complete Oral Motor Eval No   Barriers To Oral Feeding   Barriers To Oral Feeding Impaired Cognition / Attention   Oral Food Presentation   Self Feeding Needs Assistance   Dysphagia Strategies / Recommendations   Strategies / Interventions Recommended (Yes / No) Yes   Compensatory Strategies To Be Assessed   Diet / Liquid Recommendation NPO;Pre-Feeding Trials with SLP Only   Medication Administration  Other (See Comments)  (Defer to MD)   Follow Up SLP Evaluation SLP to follow   Short Term Goals   Short Term Goal # 1 Pt will consume prefeeding trials with SLP and no overt s/sx concerning for aspiration.

## 2021-08-07 NOTE — PROGRESS NOTES
"Hospital Medicine Daily Progress Note    Date of Service  8/6/2021    Chief Complaint  Roxana Cuba is a 61 y.o. female admitted 8/3/2021 with shortness of breath, headache    Hospital Course  Roxana Cuba is a 61 y.o. female with complex PMHx significant for COPD, chronic hypoxemic respiratory failure on 3L NC, pulmonary HTN, liver transplantation in 2011 for etoh cirrhosis, granulomatous hepatitis/sarcoid, Evon-en-Y gastric bypass 2018, MGUS, CKD, ?adrenal insufficiency, hypothyroidism who presented 8/3/2021 with worsening HA and visual changes x4 weeks after being diagnosed with a SDH secondary to ground level, mechanical fall 5 weeks ago while she was in [facility]. Patient also had laparotomy for \"twisting of the bowel\" in [facility] 5 weeks ago. No prior imaging available, but MRI done here reveals 29 mm subacute/chronic L SDH and SAH adjacent to L frontal/temporal lobes with 6 mm L->R shift. NSG was consulted and brought the patient to the OR and performed left craniotomy for a large subdural hematoma with membrane formation. Subgaleal drain was placed. Patient did have some hypotension in the ICU but this resolved. Her magnesium was repleted. Thus she was able to be transferred out to the neurosurgical floor. She was doing well. She did have some eye swelling on the left side. Neurosurgery said from trauma/surgery. Drain removed. arrangment for home health and oxygen were attempted, but pt not able to be discharge because oxygen was not able to be arranged    Interval Problem Update  Eye swelling. Headache. Taking Fioricet and oxycodone. Drain removed. Hoping to dc home     I have personally seen and examined the patient at bedside. I discussed the plan of care with patient, family, bedside RN, charge RN, , pharmacy and neurosurgery.    Consultants/Specialty  neurosurgery    Code Status  Full Code    Disposition  Patient is medically cleared.   Anticipate discharge to to home with close " outpatient follow-up.  I have placed the appropriate orders for post-discharge needs.    Review of Systems  Review of Systems   Constitutional: Negative for chills, fever and malaise/fatigue.   HENT: Negative for sore throat.    Eyes: Positive for double vision and pain.        Swelling of the left eye   Respiratory: Negative for cough and shortness of breath.    Cardiovascular: Negative for chest pain and leg swelling.   Gastrointestinal: Negative for abdominal pain and diarrhea.   Genitourinary: Negative for dysuria and urgency.   Musculoskeletal: Positive for neck pain.   Neurological: Positive for headaches.        Physical Exam  Temp:  [36.9 °C (98.5 °F)-37.4 °C (99.4 °F)] 37.4 °C (99.4 °F)  Pulse:  [71-75] 75  Resp:  [16-18] 18  BP: (103-131)/(51-72) 131/72  SpO2:  [91 %-96 %] 96 %    Physical Exam  Vitals and nursing note reviewed.   Constitutional:       Appearance: She is ill-appearing.   HENT:      Head:      Comments: Craniotomy, wound clean and no discharges. Slight swelling of the craniotomy on the left side   Drain in place  Eyes:      Extraocular Movements: Extraocular movements intact.      Comments: Swelling of eye, slight red, not warm. Soft, fluid collection from recent surgery    Cardiovascular:      Rate and Rhythm: Normal rate.      Heart sounds: No murmur heard.     Pulmonary:      Effort: Pulmonary effort is normal. No respiratory distress.      Breath sounds: No wheezing or rales.   Abdominal:      General: There is no distension.      Palpations: Abdomen is soft.   Musculoskeletal:      Right lower leg: No edema.   Skin:     Coloration: Skin is not jaundiced.   Neurological:      Mental Status: She is oriented to person, place, and time.      Cranial Nerves: No cranial nerve deficit.   Psychiatric:         Mood and Affect: Mood normal.         Behavior: Behavior normal.         Thought Content: Thought content normal.         Judgment: Judgment normal.         Fluids    Intake/Output  Summary (Last 24 hours) at 8/7/2021 1104  Last data filed at 8/7/2021 0900  Gross per 24 hour   Intake 0 ml   Output 0 ml   Net 0 ml       Laboratory  Recent Labs     08/05/21  0430 08/06/21  0251 08/07/21  0523   WBC 6.3 4.8 4.8   RBC 3.58* 3.28* 2.98*   HEMOGLOBIN 11.0* 10.1* 9.1*   HEMATOCRIT 33.9* 31.4* 29.1*   MCV 94.7 95.7 97.7   MCH 30.7 30.8 30.5   MCHC 32.4* 32.2* 31.3*   RDW 57.1* 59.7* 60.4*   PLATELETCT 164 153* 133*   MPV 8.9* 9.0 9.6     Recent Labs     08/05/21  0430 08/06/21  0251 08/07/21  0523   SODIUM 143 142 139   POTASSIUM 4.0 3.5* 3.9   CHLORIDE 110 109 104   CO2 25 25 28   GLUCOSE 173* 103* 100*   BUN 9 9 6*   CREATININE 0.55 0.51 0.37*   CALCIUM 7.8* 7.9* 7.9*     Recent Labs     08/06/21  0251   APTT 26.6               Imaging  CT-HEAD W/O   Final Result      1.  Interval increase in size in left frontal subdural hematoma measuring up to 14 mm in width with mass effect and midline shift from left to right of 2 mm.      2.  This was discussed with Physician: MAIDA PRITCHARD at 12:50 AM.      EC-ECHOCARDIOGRAM COMPLETE W/O CONT   Final Result      CT-HEAD W/O   Final Result      1.  Status post left frontal temporal craniotomy with evacuation of left subdural hematoma. Minimal residual hemorrhage and air is present in the left subdural space.      2.  No residual left to right midline shift.      3.  Underlying atrophy and small vessel ischemic changes again noted.      CT-HEAD W/O   Final Result      1.  Stable appearance of left frontal subdural hematoma with residual 7.5 mm left-to-right midline shift.      2.  No new hemorrhage.      3.  No herniation.      DX-CHEST-PORTABLE (1 VIEW)   Final Result      1.  Left basilar opacity may represent atelectasis or consolidation.   2.  Small left pleural effusion may be present.   3.  Atherosclerotic plaque.      MR-BRAIN-W/O   Final Result   Addendum 1 of 1   Findings were discussed with ROBERT BRYANT on 8/3/2021 1:44 PM.      Final      1.   There is subacute/chronic subdural and subarachnoid hemorrhages adjacent to the left frontal and temporal lobes. The maximum transverse dimension measures an approximately 29 mm. There is an approximately 6 mm midline shift towards right side.   2.  Mild cerebral volume loss.   3.  Mild chronic microvascular ischemic disease.      CT-HEAD W/O   Final Result      23 mm thick mixed density left frontotemporal convexity subdural hematoma with mass effect including 8 mm rightward midline shift/subfalcine herniation.      These findings were discussed with ROBERT BRYANT on 8/3/2021 9:01 AM.      DX-ABDOMEN COMPLETE WITH AP OR PA CXR   Final Result      1.  Small left pleural effusion.   2.  Moderate amount of colonic stool.   3.  No evidence of bowel obstruction.   4.  Scoliosis.      CT-HEAD W/O    (Results Pending)        Assessment/Plan  * Traumatic subdural hematoma without loss of consciousness (HCC)- (present on admission)  Assessment & Plan  Secondary to GLF 5 weeks ago.   MRI 8/3: 29 mm L SDH w/ 6 mm L->R shift.   8/4 L craniotomy with subgaleal drain  neurochecks  levetiracetam for seizure ppx, hold dvt ppx.  Drain removed by neurosurgery   Continue current pain meds    Eye swelling, left- (present on admission)  Assessment & Plan  From recent surgery   Ice   No need for antibiotic     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HCC)- (present on admission)  Assessment & Plan  Continue fludrocortisone  With some hypotension in ICU  Monitor blood pressures    GERD (gastroesophageal reflux disease)- (present on admission)  Assessment & Plan  Continue PPI    Pulmonary hypertension (HCC)- (present on admission)  Assessment & Plan  Chronic  Not in acute RHF.   Echo unremarkable. RSVP 35 mmHg  Continue home ambrisentan and tadalafil.    Chronic respiratory failure with hypoxia (HCC)- (present on admission)  Assessment & Plan  CXR showed L basilar opacity likely atelectasis. IS.   Home oxygen arrangement     Acquired hypothyroidism- (present  on admission)  Assessment & Plan  Continue levothyroxine    Status post liver transplant Dr. Canada ([facility])- (present on admission)  Overview  -December 2011: Status post liver [place] for [place] [facility] [place] Dr. Canada.        Assessment & Plan  For alcoholic cirrhosis  Continue home tacrolimus and MMF       VTE prophylaxis: SCDs/TEDs    I have performed a physical exam and reviewed and updated ROS and Plan today (8/7/2021). In review of yesterday's note (8/6/2021), there are no changes except as documented above.

## 2021-08-08 ENCOUNTER — APPOINTMENT (OUTPATIENT)
Dept: RADIOLOGY | Facility: MEDICAL CENTER | Age: 61
DRG: 026 | End: 2021-08-08
Attending: STUDENT IN AN ORGANIZED HEALTH CARE EDUCATION/TRAINING PROGRAM
Payer: MEDICARE

## 2021-08-08 ENCOUNTER — APPOINTMENT (OUTPATIENT)
Dept: RADIOLOGY | Facility: MEDICAL CENTER | Age: 61
DRG: 026 | End: 2021-08-08
Attending: INTERNAL MEDICINE
Payer: MEDICARE

## 2021-08-08 PROBLEM — I69.191 DYSPHAGIA FOLLOWING NONTRAUMATIC INTRACEREBRAL HEMORRHAGE: Status: ACTIVE | Noted: 2021-08-08

## 2021-08-08 LAB
ANION GAP SERPL CALC-SCNC: 9 MMOL/L (ref 7–16)
BUN SERPL-MCNC: 6 MG/DL (ref 8–22)
CALCIUM SERPL-MCNC: 8 MG/DL (ref 8.5–10.5)
CHLORIDE SERPL-SCNC: 98 MMOL/L (ref 96–112)
CO2 SERPL-SCNC: 26 MMOL/L (ref 20–33)
CREAT SERPL-MCNC: 0.32 MG/DL (ref 0.5–1.4)
CRP SERPL HS-MCNC: 7.79 MG/DL (ref 0–0.75)
ERYTHROCYTE [DISTWIDTH] IN BLOOD BY AUTOMATED COUNT: 56.2 FL (ref 35.9–50)
GLUCOSE SERPL-MCNC: 133 MG/DL (ref 65–99)
HCT VFR BLD AUTO: 27.4 % (ref 37–47)
HGB BLD-MCNC: 9 G/DL (ref 12–16)
MAGNESIUM SERPL-MCNC: 1.8 MG/DL (ref 1.5–2.5)
MCH RBC QN AUTO: 31 PG (ref 27–33)
MCHC RBC AUTO-ENTMCNC: 32.8 G/DL (ref 33.6–35)
MCV RBC AUTO: 94.5 FL (ref 81.4–97.8)
PHOSPHATE SERPL-MCNC: 2.8 MG/DL (ref 2.5–4.5)
PLATELET # BLD AUTO: 141 K/UL (ref 164–446)
PMV BLD AUTO: 9.3 FL (ref 9–12.9)
POTASSIUM SERPL-SCNC: 3.9 MMOL/L (ref 3.6–5.5)
PREALB SERPL-MCNC: 6.1 MG/DL (ref 18–38)
RBC # BLD AUTO: 2.9 M/UL (ref 4.2–5.4)
SODIUM SERPL-SCNC: 133 MMOL/L (ref 135–145)
WBC # BLD AUTO: 6.4 K/UL (ref 4.8–10.8)

## 2021-08-08 PROCEDURE — 83735 ASSAY OF MAGNESIUM: CPT

## 2021-08-08 PROCEDURE — 700111 HCHG RX REV CODE 636 W/ 250 OVERRIDE (IP): Performed by: STUDENT IN AN ORGANIZED HEALTH CARE EDUCATION/TRAINING PROGRAM

## 2021-08-08 PROCEDURE — 770022 HCHG ROOM/CARE - ICU (200)

## 2021-08-08 PROCEDURE — 84100 ASSAY OF PHOSPHORUS: CPT

## 2021-08-08 PROCEDURE — 99233 SBSQ HOSP IP/OBS HIGH 50: CPT | Performed by: HOSPITALIST

## 2021-08-08 PROCEDURE — 80048 BASIC METABOLIC PNL TOTAL CA: CPT

## 2021-08-08 PROCEDURE — 86140 C-REACTIVE PROTEIN: CPT

## 2021-08-08 PROCEDURE — 84134 ASSAY OF PREALBUMIN: CPT

## 2021-08-08 PROCEDURE — 700111 HCHG RX REV CODE 636 W/ 250 OVERRIDE (IP): Performed by: PHYSICIAN ASSISTANT

## 2021-08-08 PROCEDURE — 70450 CT HEAD/BRAIN W/O DYE: CPT | Mod: MG

## 2021-08-08 PROCEDURE — 700102 HCHG RX REV CODE 250 W/ 637 OVERRIDE(OP): Performed by: STUDENT IN AN ORGANIZED HEALTH CARE EDUCATION/TRAINING PROGRAM

## 2021-08-08 PROCEDURE — A9270 NON-COVERED ITEM OR SERVICE: HCPCS | Performed by: STUDENT IN AN ORGANIZED HEALTH CARE EDUCATION/TRAINING PROGRAM

## 2021-08-08 PROCEDURE — 700102 HCHG RX REV CODE 250 W/ 637 OVERRIDE(OP): Performed by: HOSPITALIST

## 2021-08-08 PROCEDURE — 85027 COMPLETE CBC AUTOMATED: CPT

## 2021-08-08 PROCEDURE — A9270 NON-COVERED ITEM OR SERVICE: HCPCS | Performed by: HOSPITALIST

## 2021-08-08 PROCEDURE — 700111 HCHG RX REV CODE 636 W/ 250 OVERRIDE (IP): Performed by: HOSPITALIST

## 2021-08-08 RX ORDER — LORAZEPAM 2 MG/ML
2 INJECTION INTRAMUSCULAR
Status: DISCONTINUED | OUTPATIENT
Start: 2021-08-08 | End: 2021-08-17 | Stop reason: HOSPADM

## 2021-08-08 RX ORDER — SILDENAFIL CITRATE 20 MG/1
20 TABLET ORAL 3 TIMES DAILY
Status: DISCONTINUED | OUTPATIENT
Start: 2021-08-08 | End: 2021-08-14

## 2021-08-08 RX ORDER — PHENYTOIN 50 MG/1
100 TABLET, CHEWABLE ORAL 3 TIMES DAILY
Status: DISCONTINUED | OUTPATIENT
Start: 2021-08-09 | End: 2021-08-09

## 2021-08-08 RX ORDER — LORAZEPAM 2 MG/ML
INJECTION INTRAMUSCULAR
Status: ACTIVE
Start: 2021-08-08 | End: 2021-08-09

## 2021-08-08 RX ORDER — MAGNESIUM SULFATE 1 G/100ML
1 INJECTION INTRAVENOUS ONCE
Status: COMPLETED | OUTPATIENT
Start: 2021-08-08 | End: 2021-08-08

## 2021-08-08 RX ORDER — GAUZE BANDAGE 2" X 2"
100 BANDAGE TOPICAL DAILY
Status: COMPLETED | OUTPATIENT
Start: 2021-08-08 | End: 2021-08-14

## 2021-08-08 RX ORDER — LORAZEPAM 2 MG/ML
2 INJECTION INTRAMUSCULAR ONCE
Status: COMPLETED | OUTPATIENT
Start: 2021-08-08 | End: 2021-08-08

## 2021-08-08 RX ADMIN — ALPRAZOLAM 0.5 MG: 0.25 TABLET ORAL at 20:28

## 2021-08-08 RX ADMIN — GABAPENTIN 100 MG: 100 CAPSULE ORAL at 18:15

## 2021-08-08 RX ADMIN — SERTRALINE HYDROCHLORIDE 100 MG: 100 TABLET ORAL at 05:04

## 2021-08-08 RX ADMIN — LEVOTHYROXINE SODIUM 137 MCG: 0.03 TABLET ORAL at 05:03

## 2021-08-08 RX ADMIN — OMEPRAZOLE 40 MG: KIT at 05:11

## 2021-08-08 RX ADMIN — LEVETIRACETAM 1000 MG: 10 INJECTION INTRAVENOUS at 18:15

## 2021-08-08 RX ADMIN — MYCOPHENOLATE MOFETIL 250 MG: 200 POWDER, FOR SUSPENSION ORAL at 18:15

## 2021-08-08 RX ADMIN — FLUDROCORTISONE ACETATE 0.5 MG: 0.1 TABLET ORAL at 05:04

## 2021-08-08 RX ADMIN — TACROLIMUS 4 MG: 5 CAPSULE ORAL at 18:15

## 2021-08-08 RX ADMIN — LEVETIRACETAM 1000 MG: 10 INJECTION INTRAVENOUS at 05:26

## 2021-08-08 RX ADMIN — SILDENAFIL 20 MG: 20 TABLET ORAL at 18:16

## 2021-08-08 RX ADMIN — PRAVASTATIN SODIUM 20 MG: 20 TABLET ORAL at 05:04

## 2021-08-08 RX ADMIN — MAGNESIUM SULFATE 1 G: 1 INJECTION INTRAVENOUS at 08:52

## 2021-08-08 RX ADMIN — MINERAL OIL, PETROLATUM 1 APPLICATION: 425; 573 OINTMENT OPHTHALMIC at 20:29

## 2021-08-08 RX ADMIN — MINERAL OIL, PETROLATUM 1 APPLICATION: 425; 573 OINTMENT OPHTHALMIC at 14:00

## 2021-08-08 RX ADMIN — Medication 100 MG: at 08:52

## 2021-08-08 RX ADMIN — ACETAMINOPHEN 650 MG: 325 TABLET, FILM COATED ORAL at 05:05

## 2021-08-08 RX ADMIN — MYCOPHENOLATE MOFETIL 250 MG: 200 POWDER, FOR SUSPENSION ORAL at 05:35

## 2021-08-08 RX ADMIN — Medication 5 MG: at 20:28

## 2021-08-08 RX ADMIN — DOCUSATE SODIUM 100 MG: 50 LIQUID ORAL at 05:03

## 2021-08-08 RX ADMIN — MINERAL OIL, PETROLATUM 1 APPLICATION: 425; 573 OINTMENT OPHTHALMIC at 05:34

## 2021-08-08 RX ADMIN — HYDROMORPHONE HYDROCHLORIDE 0.5 MG: 1 INJECTION, SOLUTION INTRAMUSCULAR; INTRAVENOUS; SUBCUTANEOUS at 02:58

## 2021-08-08 RX ADMIN — LORAZEPAM 2 MG: 2 INJECTION INTRAMUSCULAR; INTRAVENOUS at 21:40

## 2021-08-08 RX ADMIN — GABAPENTIN 100 MG: 100 CAPSULE ORAL at 05:03

## 2021-08-08 NOTE — THERAPY
Missed Therapy     Patient Name: Roxana Cuba  Age:  61 y.o., Sex:  female  Medical Record #: [id number]  Today's Date: 8/8/2021    Attempted to see pt for swallow reassessment this AMReal keller 8/7. Per RN, pt not participating or following directions this date, requesting to hold at this time. SLP following as pt medically appropriate.

## 2021-08-08 NOTE — PROGRESS NOTES
"Hospital Medicine Daily Progress Note    Date of Service  8/8/2021    Chief Complaint  Roxana Cuba is a 61 y.o. female admitted 8/3/2021 with SDH    Hospital Course  Roxana Cuba is a 61 y.o. female with complex PMHx significant for COPD, chronic hypoxemic respiratory failure on 3L NC, pulmonary HTN, liver transplantation in 2011 for etoh cirrhosis, granulomatous hepatitis/sarcoid, Evon-en-Y gastric bypass 2018, MGUS, CKD, ?adrenal insufficiency, hypothyroidism who presented 8/3/2021 with worsening HA and visual changes x4 weeks after being diagnosed with a SDH secondary to ground level, mechanical fall 5 weeks ago while she was in [facility]. Patient also had laparotomy for \"twisting of the bowel\" in [facility] 5 weeks ago. No prior imaging available, but MRI done here reveals 29 mm subacute/chronic L SDH and SAH adjacent to L frontal/temporal lobes with 6 mm L->R shift. NSG was consulted and brought the patient to the OR and performed left craniotomy for a large subdural hematoma with membrane formation. Subgaleal drain was placed. Patient did have some hypotension in the ICU but this resolved. Her magnesium was repleted. Thus she was able to be transferred out to the neurosurgical floor. She had some constipation so her MiraLAX was scheduled. Discontinued maintenance IVF.  She was preparing to discharge home with home health and oxygen when she developed intermittent confusion and slurred speech overnight so repeat head CT showed increase in SDH to 14 mm and 2 mm left to right midline shift.  Patient was transferred back to the ICU per neurosurgery.    Interval Problem Update  8/5: Internal medicine consulted and transferred out of ICU  8/6: Stable and preparing for discharge.  However, overnight, patient developed intermittent confusion and slurred speech.  Head CT ordered.  8/7: Head CT showed that the SDH had now increased to 14 mm in width.    8/8: With receptive and expressive aphasia.  Unable to perform " swallow eval.  Substituting liquid sildenafil for PAH meds.  Discussed with pulmonologist Dr. Grady. Repleting magnesium and started on oral thiamine. Still in restraints.  Continuing close ICU monitoring per neurosurgery    I have personally seen and examined the patient at bedside. I discussed the plan of care with patient, family and bedside RN.    Consultants/Specialty  Neurosurgeon Dr. Arnold  Critical care Dr. Flores  Internal medicine Dr. SRINATH Odell  Pulmonologist Dr. Grady    Code Status  Full Code    Disposition  Patient is not medically cleared.   Anticipate discharge to - pending transfer out of the ICU.  I have placed the appropriate orders for post-discharge needs.    Review of Systems  Review of Systems   Unable to perform ROS: Patient nonverbal        Physical Exam  Temp:  [37.1 °C (98.8 °F)-37.6 °C (99.6 °F)] 37.1 °C (98.8 °F)  Pulse:  [] 58  Resp:  [9-46] 10  BP: (120-187)/(56-83) 128/63  SpO2:  [91 %-99 %] 97 %    Physical Exam  Constitutional:       Appearance: She is well-developed.   HENT:      Head: Normocephalic.      Nose:      Comments: Cortrak in place  Cardiovascular:      Rate and Rhythm: Normal rate.      Heart sounds: No gallop.    Pulmonary:      Effort: No respiratory distress.      Breath sounds: No wheezing.   Abdominal:      General: There is no distension.      Tenderness: There is no abdominal tenderness.   Skin:     General: Skin is warm.      Comments: Head staples in place CDI   Neurological:      Mental Status: She is alert.      Motor: Weakness present.      Comments: Nonverbal, unable to assess   Psychiatric:      Comments: Nonverbal, unable to assess         Fluids    Intake/Output Summary (Last 24 hours) at 8/8/2021 1314  Last data filed at 8/8/2021 0526  Gross per 24 hour   Intake 373 ml   Output 650 ml   Net -277 ml       Laboratory  Recent Labs     08/06/21  0251 08/07/21  0523 08/08/21  0417   WBC 4.8 4.8 6.4   RBC 3.28* 2.98* 2.90*   HEMOGLOBIN 10.1* 9.1*  9.0*   HEMATOCRIT 31.4* 29.1* 27.4*   MCV 95.7 97.7 94.5   MCH 30.8 30.5 31.0   MCHC 32.2* 31.3* 32.8*   RDW 59.7* 60.4* 56.2*   PLATELETCT 153* 133* 141*   MPV 9.0 9.6 9.3     Recent Labs     08/06/21  0251 08/07/21  0523 08/08/21  0417   SODIUM 142 139 133*   POTASSIUM 3.5* 3.9 3.9   CHLORIDE 109 104 98   CO2 25 28 26   GLUCOSE 103* 100* 133*   BUN 9 6* 6*   CREATININE 0.51 0.37* 0.32*   CALCIUM 7.9* 7.9* 8.0*     Recent Labs     08/06/21  0251   APTT 26.6               Imaging  DX-ABDOMEN FOR TUBE PLACEMENT   Final Result      Feeding tube tip is stable in left abdomen in this patient with gastric bypass surgery      DX-ABDOMEN FOR TUBE PLACEMENT   Final Result      Feeding tube tip projects over the expected location of the proximal small bowel in this patient status post gastric bypass surgery      DX-ABDOMEN FOR TUBE PLACEMENT   Final Result      Enteric tube projects over the stomach.      CT-HEAD W/O   Final Result      1.  Again seen left-sided holohemispheric subdural hemorrhage with mixed density components and gas within overlying craniotomy. This measures 14 mm in depth and is unchanged.      2.  Again seen atrophy.      CT-HEAD W/O   Final Result      1.  Interval increase in size in left frontal subdural hematoma measuring up to 14 mm in width with mass effect and midline shift from left to right of 2 mm.      2.  This was discussed with Physician: MAIDA PRITCHARD at 12:50 AM.      EC-ECHOCARDIOGRAM COMPLETE W/O CONT   Final Result      CT-HEAD W/O   Final Result      1.  Status post left frontal temporal craniotomy with evacuation of left subdural hematoma. Minimal residual hemorrhage and air is present in the left subdural space.      2.  No residual left to right midline shift.      3.  Underlying atrophy and small vessel ischemic changes again noted.      CT-HEAD W/O   Final Result      1.  Stable appearance of left frontal subdural hematoma with residual 7.5 mm left-to-right midline shift.      2.   No new hemorrhage.      3.  No herniation.      DX-CHEST-PORTABLE (1 VIEW)   Final Result      1.  Left basilar opacity may represent atelectasis or consolidation.   2.  Small left pleural effusion may be present.   3.  Atherosclerotic plaque.      MR-BRAIN-W/O   Final Result   Addendum 1 of 1   Findings were discussed with ROBERT BRYANT on 8/3/2021 1:44 PM.      Final      1.  There is subacute/chronic subdural and subarachnoid hemorrhages adjacent to the left frontal and temporal lobes. The maximum transverse dimension measures an approximately 29 mm. There is an approximately 6 mm midline shift towards right side.   2.  Mild cerebral volume loss.   3.  Mild chronic microvascular ischemic disease.      CT-HEAD W/O   Final Result      23 mm thick mixed density left frontotemporal convexity subdural hematoma with mass effect including 8 mm rightward midline shift/subfalcine herniation.      These findings were discussed with ROBERT BRYANT on 8/3/2021 9:01 AM.      DX-ABDOMEN COMPLETE WITH AP OR PA CXR   Final Result      1.  Small left pleural effusion.   2.  Moderate amount of colonic stool.   3.  No evidence of bowel obstruction.   4.  Scoliosis.           Assessment/Plan  * Traumatic subdural hematoma without loss of consciousness (HCC)- (present on admission)  Assessment & Plan  Secondary to GLF 5 weeks ago.   MRI 8/3: 29 mm L SDH w/ 6 mm L->R shift.   8/4 L craniotomy with subgaleal drain  neurochecks  levetiracetam for seizure ppx, hold dvt ppx.  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   Echo 8/5 unremarkable and unchanged from September 2018. RSVP 35 mmHg  Previously on home ambrisentan and tadalafil.  Unable to get above meds due to cortrak  Consulted pulmonologist Dr. Ysabel Elizondo with liquid sildenafil for now per pulm  Considered bosentan 62.5 but patient has liver transplant so will hold off to avoid hepatotoxicity  Monitor patient's oxygen requirements and fluid status.  If patient becomes more hypoxic, check BNP as well as chest x-ray.  If those are worsened with, start diuresis and re-check echo.  If PA pressure greater than 45 on repeat echo, notify pulmonology.    Chronic respiratory failure with hypoxia (HCC)- (present on admission)  Assessment & Plan  CXR showed L basilar opacity likely atelectasis. IS.   Home oxygen arrangement     Acquired hypothyroidism- (present on admission)  Assessment & Plan  Continue levothyroxine    Status post liver transplant Dr. Canada ([facility])- (present on admission)  Overview  -December 2011: [place] [facility] [place].        Assessment & Plan  For alcoholic cirrhosis  Continue home tacrolimus and MMF       VTE prophylaxis: SCDs/TEDs    I have performed a physical exam and reviewed and updated ROS and Plan today (8/8/2021). In review of yesterday's note (8/7/2021), there are no changes except as documented above.

## 2021-08-08 NOTE — PROGRESS NOTES
Neurosurgery Progress Note    Subjective:  S/p fall with left SDH.  POD#4 left craniotomy for SDH    Developed dysarthria and expressive aphasia  Repeat head CT at [facility] showed interval increase in size of left frontal SDH measuring 14 mm.  2mm left to right midline shift  Not on anticoagulation   Was transferred to ICU.  Stable overnight. No neuro changes  Follows intermittently per nursing      Exam:  Awake and alert  Expressive aphasia, appears to have receptive aphasia as well, as she will only look at me, though does not follow commands.  EOMI, PERRL.  Moves extremities x4.  Incision intact  Has some edema to left eye as expected.     BP  Min: 120/81  Max: 187/80  Pulse  Av.7  Min: 58  Max: 118  Resp  Av.8  Min: 9  Max: 46  Temp  Av.2 °C (99 °F)  Min: 37.1 °C (98.8 °F)  Max: 37.6 °C (99.6 °F)  SpO2  Av.3 %  Min: 91 %  Max: 99 %    No data recorded    Recent Labs     21   WBC 4.8 4.8 6.4   RBC 3.28* 2.98* 2.90*   HEMOGLOBIN 10.1* 9.1* 9.0*   HEMATOCRIT 31.4* 29.1* 27.4*   MCV 95.7 97.7 94.5   MCH 30.8 30.5 31.0   MCHC 32.2* 31.3* 32.8*   RDW 59.7* 60.4* 56.2*   PLATELETCT 153* 133* 141*   MPV 9.0 9.6 9.3     Recent Labs     21   SODIUM 142 139 133*   POTASSIUM 3.5* 3.9 3.9   CHLORIDE 109 104 98   CO2 25 28 26   GLUCOSE 103* 100* 133*   BUN 9 6* 6*   CREATININE 0.51 0.37* 0.32*   CALCIUM 7.9* 7.9* 8.0*     Recent Labs     21   APTT 26.6           Intake/Output                       21 07 - 21 0659 21 07 - 21 0659     [id number] 4570-0781 Total [id number] 9076-0659 Total                 Intake    P.O.  0  -- 0  --  -- --    P.O. 0 -- 0 -- -- --    I.V.  50  -- 50  --  -- --    Magnesium Sulfate Volume 50 -- 50 -- -- --    NG/GT  --  125 125  --  -- --    Intake (mL) ([REMOVED] Enteral Tube 21 Cortrak - Small Bowel/Transpyloric Left nare) -- 100 100 -- -- --    Intake (mL)  (Enteral Tube 08/08/21 Cortrak - Gastric Left nare) -- 25 25 -- -- --    IV Piggyback  100  8 108  --  -- --    Volume (mL) (levETIRAcetam (Keppra) 1000 mg in 100 mL NaCl IV premix) 100 -- 100 -- -- --    Volume (mL) (tacrolimus (PROGRAF) 0.5 mg/mL oral suspension 4 mg) -- 8 8 -- -- --    Enteral  --  90 90  --  -- --    Free Water / Tube Flush -- 90 90 -- -- --    Total Intake 150 223 373 -- -- --       Output    Urine  --  650 650  --  -- --    Number of Times Voided -- 3 x 3 x -- -- --    Urine Void (mL) -- 650 650 -- -- --    Total Output -- 650 650 -- -- --       Net I/O     150 -427 -277 -- -- --            Intake/Output Summary (Last 24 hours) at 8/8/2021 0855  Last data filed at 8/8/2021 0526  Gross per 24 hour   Intake 373 ml   Output 650 ml   Net -277 ml            • thiamine  100 mg DAILY   • magnesium sulfate  1 g Once   • levETIRAcetam (Keppra) IV  1,000 mg Q12HRS   • Pharmacy  1 Each PHARMACY TO DOSE   • butalbital/apap/caffeine -40 mg  1 tablet Q6HRS PRN   • oxyCODONE immediate-release  5 mg Q3HRS PRN    Or   • oxyCODONE immediate-release  10 mg Q3HRS PRN    Or   • HYDROmorphone  0.5 mg Q3HRS PRN   • melatonin  5 mg Nightly   • sertraline  100 mg DAILY   • senna-docusate  1 tablet Nightly   • senna-docusate  1 tablet Q24HRS PRN   • polyethylene glycol/lytes  1 Packet DAILY   • acetaminophen  650 mg Q6HRS PRN   • pravastatin  20 mg DAILY   • ambrisentan  10 mg DAILY   • cloNIDine  0.1 mg Q4HRS PRN   • gabapentin  100 mg BID   • levothyroxine  137 mcg AM ES   • fludrocortisone  0.5 mg DAILY   • diphenhydrAMINE  25 mg Q6HRS PRN    Or   • diphenhydrAMINE  25 mg Q6HRS PRN   • magnesium hydroxide  30 mL QDAY PRN   • ALPRAZolam  0.5 mg TID PRN   • mycophenolate  250 mg BID   • tacrolimus  4 mg DAILY   • docusate sodium  100 mg BID   • omeprazole  40 mg DAILY   • Pharmacy Consult Request  1 Each PHARMACY TO DOSE   • MD ALERT...DO NOT ADMINISTER NSAIDS or ASPIRIN unless ORDERED By Neurosurgery  1 Each  PRN   • ondansetron  4 mg Q4HRS PRN   • diphenhydrAMINE  25 mg Q6HRS PRN   • labetalol  10 mg Q HOUR PRN   • hydrALAZINE  10 mg Q HOUR PRN   • bisacodyl  10 mg Q24HRS PRN   • fleet  1 Each Once PRN   • artificial tears  1 Application Q8HRS   • benzocaine-menthol  1 Lozenge Q2HRS PRN   • tadalafil  20 mg QHS       Assessment and Plan:  POD #4  Expressive and receptive aphasia.  Repeat head CT with larger left SDH with only 2mm MLS  -Q 1 hour neuro checks  -NPO         Prophylactic anticoagulation: no         Start date/time: two weeks.

## 2021-08-08 NOTE — CARE PLAN
The patient is Watcher - Medium risk of patient condition declining or worsening    Shift Goals  Clinical Goals: Stable neuro status  Patient Goals: ADAM  Family Goals: Comfort      Problem: Pain - Standard  Goal: Alleviation of pain or a reduction in pain to the patient’s comfort goal  Outcome: Progressing    Non-verbal and unable to communicate pain. Intermittently restless, attempting to get OOB, and pulled NGT out twice even with restraints on. Oxy, dilaudid, and tylenol utilized for PRN pain.     Problem: Fall Risk  Goal: Patient will remain free from falls  Outcome: Progressing     Frequently attempting to get OOB. Vest, bilateral soft wrist, and mitts applied for safety and to prevent tube removal. Bed alarm on for safety.    Problem: Nutrition  Goal: Patient's nutritional and fluid intake will be adequate or improve  Outcome: Progressing     Fibersource HN infusing at 25 mL/hour via enteral tube. Otherwise has been NPO.

## 2021-08-08 NOTE — DIETARY
"Nutrition Support/TF Assessment   Day 5 of admit.  Roxana Cuba is a 61 y.o. female with admitting DX of subdural hematoma.      Current problem list:  1. Eye swelling, left   2. Traumatic subdural hematoma without loss of consciousness   3. [place]'s disease  4. GERD   5. Pulmonary hypertension   6. Chronic respiratory failure with hypoxia   7. Status post liver transplant Dr. Canada ([facility], Dec 2011)  8. Acquired hypothyroidism            Assessment:  Estimated Nutritional Needs: based on:   Height: 175.3 cm (5' 9.02\") (copied from chart)  Weight: 69.7 kg (153 lb 10.6 oz)  Weight to Use in Calculations: 66.9 kg (147 lb 7.8 oz) (per stand up scale on 8/3)  Ideal Body Weight: 65.8 kg (145 lb)  Percent Ideal Body Weight: 106  Body mass index is 22.68 kg/m²., BMI classification: Normal.     Calculation/Equation: MSJ X 1.2= 1560  Total Calories / day: 9879-5039 (Calories / k-26)  Total Grams Protein / day: 67-94  (Grams Protein / k.0 - 1.4)  Total Free Water/day: 0673-3527 ml/day (~ 30-35 ml/kg)     Evaluation:   1. TF consult received, pt failed SLP evaluation yesterday, pending re-eval today.   2. Enteral access per KUB: The tip projects over the left upper quadrant, within stomach or proximal small bowel in this patient with gastric bypass surgery.  3. Pt with complex PMHx significant for COPD, chronic hypoxemic respiratory failure on 3L NC, pulmonary HTN, liver transplantation in  for etoh cirrhosis, granulomatous hepatitis/sarcoid, Evon-en-Y gastric bypass , MGUS, CKD, ?adrenal insufficiency, hypothyroidism. Pt had  laparotomy for \"twisting of the bowel\" in [facility] 5 weeks ago.  4. Pertinent Labs: Na+ 133, BUN 6, Creat 0.32, Prealbumin 6.1, no CRP to assess. Phos and K+ within normal limites. Pt with noted hx of DM per chart, no recent HbA1c or FSBS to assess.  Pt with CKD, no HD noted.   5. Pertinent Meds: Dulcolax PRN, Colace twice daily, Zofran PRN, Miralax daily, Pericolace or Senokot " PRN/nightly.   6. Skin: no pressure ulcers noted.   7. Neurosurgery following: S/p fall with left SDH, POD#4 left craniotomy for SDH and developed dysarthria and expressive aphasia overnight on 8/6.   8. RD following for poor PO/wt loss, increased risk for refeeding.      Malnutrition Risk (Per RD note on 8/5): Pt meets criteria for severe malnutrition in context of acute illness related to poor appetite following intestinal surgery as evidenced by reported poor PO intake <50% of needs x 5 weeks with 5.8% weight loss x 5 weeks.     Recommendations/Plan:  1. Begin TF with Fibersource HN @ 25 ml/hr and advance per TF protocol to goal rate of 55 ml/hr to provide 1584 kcals/day, 71 g pro/day (1.1 g pro/kg), and 1069 ml free water/day.  2. RD to monitor glucose/Renal labs and adjust TF as needed.  3. Monitor for refeeding: Order BMP w/ Mg and Phos x 7 days. Replete K, Phos and Mg prn. Supplement 100 mg Thiamine x 7 days to reduce risk of refeeding  4. Begin PO diet when determined safe per SLP.   5. Fluids per MD.  6. RD following.

## 2021-08-09 ENCOUNTER — APPOINTMENT (OUTPATIENT)
Dept: RADIOLOGY | Facility: MEDICAL CENTER | Age: 61
DRG: 026 | End: 2021-08-09
Attending: STUDENT IN AN ORGANIZED HEALTH CARE EDUCATION/TRAINING PROGRAM
Payer: MEDICARE

## 2021-08-09 LAB
ANION GAP SERPL CALC-SCNC: 8 MMOL/L (ref 7–16)
BUN SERPL-MCNC: 5 MG/DL (ref 8–22)
CALCIUM SERPL-MCNC: 7.8 MG/DL (ref 8.5–10.5)
CHLORIDE SERPL-SCNC: 98 MMOL/L (ref 96–112)
CO2 SERPL-SCNC: 28 MMOL/L (ref 20–33)
CREAT SERPL-MCNC: 0.35 MG/DL (ref 0.5–1.4)
CRP SERPL HS-MCNC: 4.99 MG/DL (ref 0–0.75)
ERYTHROCYTE [DISTWIDTH] IN BLOOD BY AUTOMATED COUNT: 55 FL (ref 35.9–50)
GLUCOSE SERPL-MCNC: 155 MG/DL (ref 65–99)
HCT VFR BLD AUTO: 28.3 % (ref 37–47)
HGB BLD-MCNC: 9.4 G/DL (ref 12–16)
MAGNESIUM SERPL-MCNC: 1.8 MG/DL (ref 1.5–2.5)
MCH RBC QN AUTO: 31.1 PG (ref 27–33)
MCHC RBC AUTO-ENTMCNC: 33.2 G/DL (ref 33.6–35)
MCV RBC AUTO: 93.7 FL (ref 81.4–97.8)
NT-PROBNP SERPL IA-MCNC: 4372 PG/ML (ref 0–125)
PHOSPHATE SERPL-MCNC: 2.1 MG/DL (ref 2.5–4.5)
PLATELET # BLD AUTO: 143 K/UL (ref 164–446)
PMV BLD AUTO: 9.2 FL (ref 9–12.9)
POTASSIUM SERPL-SCNC: 3.8 MMOL/L (ref 3.6–5.5)
PREALB SERPL-MCNC: 6.5 MG/DL (ref 18–38)
RBC # BLD AUTO: 3.02 M/UL (ref 4.2–5.4)
SODIUM SERPL-SCNC: 134 MMOL/L (ref 135–145)
WBC # BLD AUTO: 5.3 K/UL (ref 4.8–10.8)

## 2021-08-09 PROCEDURE — A9270 NON-COVERED ITEM OR SERVICE: HCPCS | Performed by: STUDENT IN AN ORGANIZED HEALTH CARE EDUCATION/TRAINING PROGRAM

## 2021-08-09 PROCEDURE — 92526 ORAL FUNCTION THERAPY: CPT

## 2021-08-09 PROCEDURE — 700101 HCHG RX REV CODE 250: Performed by: STUDENT IN AN ORGANIZED HEALTH CARE EDUCATION/TRAINING PROGRAM

## 2021-08-09 PROCEDURE — 700111 HCHG RX REV CODE 636 W/ 250 OVERRIDE (IP): Performed by: STUDENT IN AN ORGANIZED HEALTH CARE EDUCATION/TRAINING PROGRAM

## 2021-08-09 PROCEDURE — 71045 X-RAY EXAM CHEST 1 VIEW: CPT

## 2021-08-09 PROCEDURE — 83735 ASSAY OF MAGNESIUM: CPT

## 2021-08-09 PROCEDURE — 86140 C-REACTIVE PROTEIN: CPT

## 2021-08-09 PROCEDURE — 97116 GAIT TRAINING THERAPY: CPT

## 2021-08-09 PROCEDURE — 700102 HCHG RX REV CODE 250 W/ 637 OVERRIDE(OP): Performed by: STUDENT IN AN ORGANIZED HEALTH CARE EDUCATION/TRAINING PROGRAM

## 2021-08-09 PROCEDURE — 99233 SBSQ HOSP IP/OBS HIGH 50: CPT | Performed by: HOSPITALIST

## 2021-08-09 PROCEDURE — 700102 HCHG RX REV CODE 250 W/ 637 OVERRIDE(OP): Performed by: HOSPITALIST

## 2021-08-09 PROCEDURE — 84134 ASSAY OF PREALBUMIN: CPT

## 2021-08-09 PROCEDURE — 83880 ASSAY OF NATRIURETIC PEPTIDE: CPT

## 2021-08-09 PROCEDURE — 97530 THERAPEUTIC ACTIVITIES: CPT

## 2021-08-09 PROCEDURE — 770022 HCHG ROOM/CARE - ICU (200)

## 2021-08-09 PROCEDURE — 700111 HCHG RX REV CODE 636 W/ 250 OVERRIDE (IP): Performed by: HOSPITALIST

## 2021-08-09 PROCEDURE — 700105 HCHG RX REV CODE 258: Performed by: STUDENT IN AN ORGANIZED HEALTH CARE EDUCATION/TRAINING PROGRAM

## 2021-08-09 PROCEDURE — 85027 COMPLETE CBC AUTOMATED: CPT

## 2021-08-09 PROCEDURE — 80048 BASIC METABOLIC PNL TOTAL CA: CPT

## 2021-08-09 PROCEDURE — 84100 ASSAY OF PHOSPHORUS: CPT

## 2021-08-09 PROCEDURE — A9270 NON-COVERED ITEM OR SERVICE: HCPCS | Performed by: HOSPITALIST

## 2021-08-09 RX ORDER — LEVETIRACETAM 15 MG/ML
1500 INJECTION INTRAVASCULAR EVERY 12 HOURS
Status: DISCONTINUED | OUTPATIENT
Start: 2021-08-09 | End: 2021-08-11

## 2021-08-09 RX ORDER — PHENYTOIN SODIUM 50 MG/ML
100 INJECTION INTRAMUSCULAR; INTRAVENOUS EVERY 8 HOURS
Status: DISCONTINUED | OUTPATIENT
Start: 2021-08-09 | End: 2021-08-10

## 2021-08-09 RX ORDER — FUROSEMIDE 40 MG/1
20 TABLET ORAL DAILY
Status: DISCONTINUED | OUTPATIENT
Start: 2021-08-09 | End: 2021-08-10

## 2021-08-09 RX ORDER — LEVETIRACETAM 15 MG/ML
1500 INJECTION INTRAVASCULAR EVERY 12 HOURS
Status: DISCONTINUED | OUTPATIENT
Start: 2021-08-09 | End: 2021-08-09

## 2021-08-09 RX ORDER — FUROSEMIDE 10 MG/ML
20 INJECTION INTRAMUSCULAR; INTRAVENOUS ONCE
Status: COMPLETED | OUTPATIENT
Start: 2021-08-09 | End: 2021-08-09

## 2021-08-09 RX ADMIN — PRAVASTATIN SODIUM 20 MG: 20 TABLET ORAL at 05:39

## 2021-08-09 RX ADMIN — SILDENAFIL 20 MG: 20 TABLET ORAL at 13:45

## 2021-08-09 RX ADMIN — OMEPRAZOLE 40 MG: KIT at 05:43

## 2021-08-09 RX ADMIN — GABAPENTIN 100 MG: 100 CAPSULE ORAL at 05:39

## 2021-08-09 RX ADMIN — Medication 100 MG: at 05:39

## 2021-08-09 RX ADMIN — MYCOPHENOLATE MOFETIL 250 MG: 200 POWDER, FOR SUSPENSION ORAL at 05:41

## 2021-08-09 RX ADMIN — LEVETIRACETAM INJECTION 1500 MG: 15 INJECTION INTRAVENOUS at 05:32

## 2021-08-09 RX ADMIN — LORAZEPAM 2 MG: 2 INJECTION INTRAMUSCULAR; INTRAVENOUS at 01:20

## 2021-08-09 RX ADMIN — MYCOPHENOLATE MOFETIL 250 MG: 200 POWDER, FOR SUSPENSION ORAL at 17:15

## 2021-08-09 RX ADMIN — PHENYTOIN 100 MG: 50 TABLET, CHEWABLE ORAL at 05:39

## 2021-08-09 RX ADMIN — LEVETIRACETAM INJECTION 1500 MG: 15 INJECTION INTRAVENOUS at 17:14

## 2021-08-09 RX ADMIN — FUROSEMIDE 20 MG: 40 TABLET ORAL at 13:45

## 2021-08-09 RX ADMIN — GABAPENTIN 100 MG: 100 CAPSULE ORAL at 17:14

## 2021-08-09 RX ADMIN — FLUDROCORTISONE ACETATE 0.5 MG: 0.1 TABLET ORAL at 05:39

## 2021-08-09 RX ADMIN — TACROLIMUS 4 MG: 5 CAPSULE ORAL at 17:16

## 2021-08-09 RX ADMIN — PHENYTOIN SODIUM 100 MG: 50 INJECTION INTRAMUSCULAR; INTRAVENOUS at 13:45

## 2021-08-09 RX ADMIN — Medication 5 MG: at 20:08

## 2021-08-09 RX ADMIN — SILDENAFIL 20 MG: 20 TABLET ORAL at 05:40

## 2021-08-09 RX ADMIN — MINERAL OIL, PETROLATUM 1 APPLICATION: 425; 573 OINTMENT OPHTHALMIC at 05:33

## 2021-08-09 RX ADMIN — FUROSEMIDE 20 MG: 10 INJECTION, SOLUTION INTRAVENOUS at 05:38

## 2021-08-09 RX ADMIN — LEVOTHYROXINE SODIUM 137 MCG: 0.03 TABLET ORAL at 05:39

## 2021-08-09 RX ADMIN — PHENYTOIN SODIUM 1000 MG: 50 INJECTION INTRAMUSCULAR; INTRAVENOUS at 00:26

## 2021-08-09 RX ADMIN — DOCUSATE SODIUM 100 MG: 50 LIQUID ORAL at 10:07

## 2021-08-09 RX ADMIN — OXYCODONE HYDROCHLORIDE 10 MG: 10 TABLET ORAL at 22:09

## 2021-08-09 RX ADMIN — PHENYTOIN SODIUM 100 MG: 50 INJECTION INTRAMUSCULAR; INTRAVENOUS at 21:40

## 2021-08-09 RX ADMIN — SERTRALINE HYDROCHLORIDE 100 MG: 100 TABLET ORAL at 05:39

## 2021-08-09 RX ADMIN — SILDENAFIL 20 MG: 20 TABLET ORAL at 17:14

## 2021-08-09 RX ADMIN — POTASSIUM PHOSPHATE, MONOBASIC AND POTASSIUM PHOSPHATE, DIBASIC 30 MMOL: 224; 236 INJECTION, SOLUTION, CONCENTRATE INTRAVENOUS at 05:34

## 2021-08-09 ASSESSMENT — COGNITIVE AND FUNCTIONAL STATUS - GENERAL
SUGGESTED CMS G CODE MODIFIER DAILY ACTIVITY: CL
TURNING FROM BACK TO SIDE WHILE IN FLAT BAD: UNABLE
MOVING FROM LYING ON BACK TO SITTING ON SIDE OF FLAT BED: UNABLE
DRESSING REGULAR LOWER BODY CLOTHING: A LOT
MOBILITY SCORE: 9
SUGGESTED CMS G CODE MODIFIER MOBILITY: CL
CLIMB 3 TO 5 STEPS WITH RAILING: TOTAL
DAILY ACTIVITIY SCORE: 11
TOILETING: A LOT
STANDING UP FROM CHAIR USING ARMS: A LOT
TURNING FROM BACK TO SIDE WHILE IN FLAT BAD: A LOT
STANDING UP FROM CHAIR USING ARMS: A LITTLE
DRESSING REGULAR UPPER BODY CLOTHING: A LOT
WALKING IN HOSPITAL ROOM: A LOT
MOVING TO AND FROM BED TO CHAIR: UNABLE
CLIMB 3 TO 5 STEPS WITH RAILING: A LITTLE
SUGGESTED CMS G CODE MODIFIER MOBILITY: CM
HELP NEEDED FOR BATHING: A LOT
EATING MEALS: TOTAL
MOBILITY SCORE: 14
PERSONAL GROOMING: A LOT
MOVING TO AND FROM BED TO CHAIR: UNABLE
WALKING IN HOSPITAL ROOM: A LITTLE
MOVING FROM LYING ON BACK TO SITTING ON SIDE OF FLAT BED: A LITTLE

## 2021-08-09 ASSESSMENT — FIBROSIS 4 INDEX: FIB4 SCORE: 3.24

## 2021-08-09 ASSESSMENT — ENCOUNTER SYMPTOMS: SEIZURES: 1

## 2021-08-09 ASSESSMENT — GAIT ASSESSMENTS
ASSISTIVE DEVICE: FRONT WHEEL WALKER
DISTANCE (FEET): 15
GAIT LEVEL OF ASSIST: MINIMAL ASSIST
DEVIATION: DECREASED BASE OF SUPPORT;DECREASED HEEL STRIKE;DECREASED TOE OFF

## 2021-08-09 NOTE — THERAPY
"Speech Language Pathology  Daily Treatment     Patient Name: Roxana Cuba  Age:  61 y.o., Sex:  female  Medical Record #: [id number]  Today's Date: 8/9/2021     Precautions  Precautions: (P) Fall Risk, Swallow Precautions ( See Comments)  Comments: Hemovac in L temporal region    Assessment    Pt seen at bedside; NG in place for nutritional needs. Pt rousable for tx, but lethargic with frequent verbal cuing required. Per RN, Pt with seizures last night.   Pt able to follow simple motoric commands and nod to yes/no questions.  Oral care completed with max assist from SLP. Trials of ice chips and pureed completed with modeling and cuing to attempt Effortful Swallow exercise with each bolus. Pt noted to have good labial seal without anterior leakage. Slow A-P transit and intermittent coughing noted.   Pt unable to follow steps to complete other pharyngeal exercises.    Plan    Remain NPO 2' overt s/sx of pen/asp.    Continue current treatment plan.    Discharge Recommendations: (P) Recommend post-acute placement for additional speech therapy services prior to discharge home     Objective       08/09/21 0850   Dysphagia    Diet / Liquid Recommendation NPO;Pre-Feeding Trials with SLP Only   Nutritional Liquid Intake Rating Scale Nothing by mouth   Nutritional Food Intake Rating Scale Nothing by mouth   Nursing Communication Swallow Precaution Sign Posted at Head of Bed   Skilled Intervention Verbal Cueing  (PO trials)   Recommended Route of Medication Administration   Medication Administration    (via NG)   Patient / Family Goals   Patient / Family Goal #1 \"Please\"   Goal #1 Outcome Progressing slower than expected   Short Term Goals   Short Term Goal # 1 Pt will consume prefeeding trials with SLP and no overt s/sx concerning for aspiration.   Goal Outcome # 1 Progressing slower than expected         "

## 2021-08-09 NOTE — THERAPY
Physical Therapy   Daily Treatment     Patient Name: Roxana Cuba  Age:  61 y.o., Sex:  female  Medical Record #: [id number]  Today's Date: 8/9/2021     Precautions: Fall Risk, Swallow Precautions, Nasogastric Tube    Assessment  Pt seen for PT treatment session. Pt was transferred back to ICU on 8/7 with seizures overnight and incr in size of L frontal SDH. Pt motivated to participate. Pt requires slightly more assist this session than previously needed, but was able to ambulate in-room with FWW and min A for stability and FWW management. Currently recommend postacute placement, however, anticipate with incr time OOB with nursing and therapy, pt will progress to a functional level appropriate to return home. May benefit from PMR consult. Will follow.     Plan  4x/wk  DC Equipment Recommendations: Front-Wheel Walker  Discharge Recommendations: Recommend post-acute placement for additional physical therapy services prior to discharge home (may progress quickly to appropriate level to return home)         08/09/21 1421   Cognition    Speech/ Communication Expressive Aphasia   Level of Consciousness Alert   Safety Awareness Impulsive   Attention Impaired   Comments pleasant and appears motivated. impulsive and requires cues for safety. word finding difficulty but improving conversation per RN and pt able to nod appropriately.    Balance   Sitting Balance (Static) Fair +   Sitting Balance (Dynamic) Fair   Standing Balance (Static) Fair -   Standing Balance (Dynamic) Poor +   Weight Shift Sitting Fair   Weight Shift Standing Fair   Comments standing with FWW   Gait Analysis   Gait Level Of Assist Minimal Assist   Assistive Device Front Wheel Walker   Distance (Feet) 15   Deviation Decreased Base Of Support;Decreased Heel Strike;Decreased Toe Off   Weight Bearing Status no restrictions   Comments cues to avoid running into objects on L side. pt with some unsteadiness and required min A for safety and FWW management, no  overt LOB   Bed Mobility    Supine to Sit Minimal Assist   Scooting Minimal Assist   Rolling Minimal Assist to Rt.;Minimum Assist to Lt.   Comments cues for initiating movement, use of rails for bed mobility. rolling multiple times for linen change and pericare.    Functional Mobility   Sit to Stand Supervised   Bed, Chair, WC Transfer Minimal Assist   Mobility STS to FWW, decr eccentric control to sit   Comments initially with posterior lean but improved quickly   Short Term Goals    Short Term Goal # 1 pt will be able to perform sit<>stand/transfers with FWW wtih spv in 6tx to promtoe dc to home    Goal Outcome # 1 Goal met   Short Term Goal # 2 pt will be able to ambulate 150ft with FWW with Spv in 6tx to promote dc to home plan   Goal Outcome # 2 Progressing as expected   Short Term Goal # 3 pt will perform supine <> sit without bed features with SPV in 6 visits to be able to get in/out of bed at home   Goal Outcome # 3 Goal not met (added 8/9)

## 2021-08-09 NOTE — CARE PLAN
Problem: Pain - Standard  Goal: Alleviation of pain or a reduction in pain to the patient’s comfort goal  Outcome: Progressing   NOTE: Pain is evaluated based off CPOT score and treated per MAR.    Problem: Fall Risk  Goal: Patient will remain free from falls  Outcome: Progressing  NOTE: Bed in locked and lowest position with be alarm on. Patient close to nursing station     The patient is Unstable - High likelihood or risk of patient condition declining or worsening    Shift Goals  Clinical Goals: Neurostability, no seizure activity  Patient Goals: ADAM  Family Goals: Comfort

## 2021-08-09 NOTE — PROGRESS NOTES
0120 RN noticed patients mouth twitching, then with rhythmic jerking motion of entire body. Lasting 20 seconds before resolving with Ativan administration. Only able to be aroused by painful stimulus after event. MD made aware.    0300 Possible left sided weakness although difficult to assess. Pupils sluggish and 5 mm. MD made aware.O2 requirements and neuro exam discussed.

## 2021-08-09 NOTE — DISCHARGE PLANNING
ATTN: Case Management  RE: Referral for Home Health    Reason for referral denial: Due to change in condition and patients transfer to ICU HH will at this time rescind acceptance. Please resend referral once closer to [place] and HH will then review.                    Unfortunately, we are not able to accept this referral for the reason listed above. If further clarity is needed, our Transitional Care Specialists are available to discuss any barriers to service at [phone].      We look forward to collaborating with you in the future,  [facility]

## 2021-08-09 NOTE — PROGRESS NOTES
"Hospital Medicine Daily Progress Note    Date of Service  8/9/2021    Chief Complaint  Roxana Cuba is a 61 y.o. female admitted 8/3/2021 with SDH    Hospital Course  Roxana Cuba is a 61 y.o. female with complex PMHx significant for COPD, chronic hypoxemic respiratory failure on 3L NC, pulmonary HTN, liver transplantation in 2011 for etoh cirrhosis, granulomatous hepatitis/sarcoid, Evon-en-Y gastric bypass 2018, MGUS, CKD, ?adrenal insufficiency, hypothyroidism who presented 8/3/2021 with worsening HA and visual changes x4 weeks after being diagnosed with a SDH secondary to ground level, mechanical fall 5 weeks ago while she was in [facility]. Patient also had laparotomy for \"twisting of the bowel\" in [facility] 5 weeks ago. No prior imaging available, but MRI done here reveals 29 mm subacute/chronic L SDH and SAH adjacent to L frontal/temporal lobes with 6 mm L->R shift. NSG was consulted and brought the patient to the OR and performed left craniotomy for a large subdural hematoma with membrane formation. Subgaleal drain was placed. Patient did have some hypotension in the ICU but this resolved. Her magnesium was repleted. Thus she was able to be transferred out to the neurosurgical floor. She had some constipation so her MiraLAX was scheduled. Discontinued maintenance IVF.  She was preparing to discharge home with home health and oxygen when she developed intermittent confusion and slurred speech overnight so repeat head CT showed increase in SDH to 14 mm and 2 mm left to right midline shift.  Patient was transferred back to the ICU per neurosurgery.    Interval Problem Update  8/5: Internal medicine consulted and transferred out of ICU  8/6: Stable and preparing for discharge.  However, overnight, patient developed intermittent confusion and slurred speech.  Head CT ordered.  8/7: Head CT showed that the SDH had now increased to 14 mm in width.    8/8: With receptive and expressive aphasia.  Unable to perform " swallow eval.  Substituting liquid sildenafil for PAH meds.  Discussed with pulmonologist Dr. Grady. Repleting magnesium and started on oral thiamine. Still in restraints.  8/9: Patient is critically ill.   The patient had: Multiple seizures overnight   The vital organ system that is affected is the: brain  If untreated there is a high chance of deterioration into: brain damage  And eventually death.   The critical care that I am providing today is: Close and careful monitoring   Seizure overnight.  Started on Dilantin.  Chest x-ray showed pulmonary edema.  BNP elevated at 4372.  Given a dose of Lasix.  Repleting phosphorus.  Checking urinalysis given dysuria.  The critical that has been undertaken is medically complex.   There has been no overlap in critical care time.   Critical Care Time not including procedures: 32 mins    I have personally seen and examined the patient at bedside. I discussed the plan of care with patient and bedside RN.    Consultants/Specialty  Neurosurgeon Dr. Arnold  Critical care Dr. Flores  Internal medicine Dr. SRINATH Odell  discussed with Pulmonologist Dr. Grady    Code Status  Full Code    Disposition  Patient is not medically cleared.   Anticipate discharge to - pending transfer out of the ICU.  I have placed the appropriate orders for post-discharge needs.    Review of Systems  Review of Systems   Unable to perform ROS: Mental status change   Neurological: Positive for seizures.        Physical Exam  Temp:  [36.8 °C (98.2 °F)-37.4 °C (99.4 °F)] 37.3 °C (99.2 °F)  Pulse:  [] 64  Resp:  [11-39] 15  BP: ()/() 84/54  SpO2:  [93 %-100 %] 97 %    Physical Exam  Constitutional:       Appearance: She is well-developed.   HENT:      Head: Normocephalic.      Nose:      Comments: Cortrak in place  Cardiovascular:      Rate and Rhythm: Normal rate.      Heart sounds: No gallop.    Pulmonary:      Effort: No respiratory distress.      Breath sounds: No wheezing.   Abdominal:       General: There is no distension.      Tenderness: There is no abdominal tenderness.   Skin:     General: Skin is warm.      Comments: Head staples in place CDI   Neurological:      Mental Status: She is alert.      Motor: Weakness present.      Comments: Able to say a couple words including her own name   Psychiatric:      Comments: Nonverbal, unable to assess         Fluids    Intake/Output Summary (Last 24 hours) at 8/9/2021 0841  Last data filed at 8/9/2021 0600  Gross per 24 hour   Intake 726.67 ml   Output 300 ml   Net 426.67 ml       Laboratory  Recent Labs     08/07/21 0523 08/08/21 0417 08/09/21 0223   WBC 4.8 6.4 5.3   RBC 2.98* 2.90* 3.02*   HEMOGLOBIN 9.1* 9.0* 9.4*   HEMATOCRIT 29.1* 27.4* 28.3*   MCV 97.7 94.5 93.7   MCH 30.5 31.0 31.1   MCHC 31.3* 32.8* 33.2*   RDW 60.4* 56.2* 55.0*   PLATELETCT 133* 141* 143*   MPV 9.6 9.3 9.2     Recent Labs     08/07/21 0523 08/08/21 0417 08/09/21 0223   SODIUM 139 133* 134*   POTASSIUM 3.9 3.9 3.8   CHLORIDE 104 98 98   CO2 28 26 28   GLUCOSE 100* 133* 155*   BUN 6* 6* 5*   CREATININE 0.37* 0.32* 0.35*   CALCIUM 7.9* 8.0* 7.8*                   Imaging  DX-CHEST-PORTABLE (1 VIEW)   Final Result         1.  Interstitial pulmonary parenchymal prominence suggest chronic underlying lung disease, component of interstitial edema and/or infiltrates not excluded.   2.  Small left pleural effusion   3.  Cardiomegaly   4.  Atherosclerosis      CT-HEAD W/O   Final Result         1.  Left subdural hematoma, stable since prior study.   2.  3.3 mm left right midline shift, similar compared to prior study.   3.  Minimal pneumocephalus, stable to slightly decreased since prior study.   4.  Nonspecific white matter changes, commonly associated with small vessel ischemic disease.  Associated mild cerebral atrophy is noted.   5.  Atherosclerosis.      DX-ABDOMEN FOR TUBE PLACEMENT   Final Result      Feeding tube tip is stable in left abdomen in this patient with gastric  bypass surgery      DX-ABDOMEN FOR TUBE PLACEMENT   Final Result      Feeding tube tip projects over the expected location of the proximal small bowel in this patient status post gastric bypass surgery      DX-ABDOMEN FOR TUBE PLACEMENT   Final Result      Enteric tube projects over the stomach.      CT-HEAD W/O   Final Result      1.  Again seen left-sided holohemispheric subdural hemorrhage with mixed density components and gas within overlying craniotomy. This measures 14 mm in depth and is unchanged.      2.  Again seen atrophy.      CT-HEAD W/O   Final Result      1.  Interval increase in size in left frontal subdural hematoma measuring up to 14 mm in width with mass effect and midline shift from left to right of 2 mm.      2.  This was discussed with Physician: MAIDA PRITCHARD at 12:50 AM.      EC-ECHOCARDIOGRAM COMPLETE W/O CONT   Final Result      CT-HEAD W/O   Final Result      1.  Status post left frontal temporal craniotomy with evacuation of left subdural hematoma. Minimal residual hemorrhage and air is present in the left subdural space.      2.  No residual left to right midline shift.      3.  Underlying atrophy and small vessel ischemic changes again noted.      CT-HEAD W/O   Final Result      1.  Stable appearance of left frontal subdural hematoma with residual 7.5 mm left-to-right midline shift.      2.  No new hemorrhage.      3.  No herniation.      DX-CHEST-PORTABLE (1 VIEW)   Final Result      1.  Left basilar opacity may represent atelectasis or consolidation.   2.  Small left pleural effusion may be present.   3.  Atherosclerotic plaque.      MR-BRAIN-W/O   Final Result   Addendum 1 of 1   Findings were discussed with ROBERT BRYANT on 8/3/2021 1:44 PM.      Final      1.  There is subacute/chronic subdural and subarachnoid hemorrhages adjacent to the left frontal and temporal lobes. The maximum transverse dimension measures an approximately 29 mm. There is an approximately 6 mm midline shift  towards right side.   2.  Mild cerebral volume loss.   3.  Mild chronic microvascular ischemic disease.      CT-HEAD W/O   Final Result      23 mm thick mixed density left frontotemporal convexity subdural hematoma with mass effect including 8 mm rightward midline shift/subfalcine herniation.      These findings were discussed with ROBERT BRYANT on 8/3/2021 9:01 AM.      DX-ABDOMEN COMPLETE WITH AP OR PA CXR   Final Result      1.  Small left pleural effusion.   2.  Moderate amount of colonic stool.   3.  No evidence of bowel obstruction.   4.  Scoliosis.           Assessment/Plan  * Traumatic subdural hematoma without loss of consciousness (HCC)- (present on admission)  Assessment & Plan  Secondary to GLF 5 weeks ago.   MRI 8/3: 29 mm L SDH w/ 6 mm L->R shift.   8/4 L craniotomy with subgaleal drain  neurochecks  levetiracetam for seizure ppx, hold dvt ppx.  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   Echo 8/5 unremarkable and unchanged from September 2018. RSVP 35 mmHg  Previously on home ambrisentan and tadalafil.  Unable to get above meds due to cortrak  Consulted pulmonologist Dr. Grady  Substitute with liquid sildenafil for now per pulm  Considered bosentan 62.5 but patient has liver transplant so will hold off to  avoid hepatotoxicity  Monitor patient's oxygen requirements and fluid status.  If patient becomes more hypoxic, check BNP as well as chest x-ray.  If those are worsened with, start diuresis and re-check echo.  If PA pressure greater than 45 on repeat echo, notify pulmonology.    Chronic respiratory failure with hypoxia (HCC)- (present on admission)  Assessment & Plan  CXR showed L basilar opacity likely atelectasis. IS.   Home oxygen arrangement     Acquired hypothyroidism- (present on admission)  Assessment & Plan  Continue levothyroxine    Status post liver transplant Dr. Canada ([facility])- (present on admission)  Overview  -December 2011: [place] [facility].        Assessment & Plan  For alcoholic cirrhosis  Continue home tacrolimus and MMF       VTE prophylaxis: SCDs/TEDs    I have performed a physical exam and reviewed and updated ROS and Plan today (8/9/2021). In review of yesterday's note (8/8/2021), there are no changes except as documented above.

## 2021-08-09 NOTE — PROGRESS NOTES
Neurosurgery Progress Note    Subjective:  S/p fall with left SDH.  POD#5 left craniotomy for SDH    Developed dysarthria and expressive aphasia  Repeat head CT at [facility] showed interval increase in size of left frontal SDH measuring 14 mm.  2mm left to right midline shift  Not on anticoagulation   Was transferred to ICU.  Multiple seizures overnight  Dilantin added  Follows intermittently per nursing      Exam:  Awake and alert  Expressive aphasia, but is alert to self and time  EOMI, PERRL.  Moves extremities x4.  Incision intact  Has some edema to left eye as expected.     BP  Min: 84/54  Max: 162/135  Pulse  Av.3  Min: 61  Max: 110  Resp  Av.5  Min: 13  Max: 39  Temp  Av.2 °C (98.9 °F)  Min: 36.8 °C (98.2 °F)  Max: 37.4 °C (99.4 °F)  SpO2  Av.1 %  Min: 93 %  Max: 100 %    No data recorded    Recent Labs     21   WBC 4.8 6.4 5.3   RBC 2.98* 2.90* 3.02*   HEMOGLOBIN 9.1* 9.0* 9.4*   HEMATOCRIT 29.1* 27.4* 28.3*   MCV 97.7 94.5 93.7   MCH 30.5 31.0 31.1   MCHC 31.3* 32.8* 33.2*   RDW 60.4* 56.2* 55.0*   PLATELETCT 133* 141* 143*   MPV 9.6 9.3 9.2     Recent Labs     21   SODIUM 139 133* 134*   POTASSIUM 3.9 3.9 3.8   CHLORIDE 104 98 98   CO2 28 26 28   GLUCOSE 100* 133* 155*   BUN 6* 6* 5*   CREATININE 0.37* 0.32* 0.35*   CALCIUM 7.9* 8.0* 7.8*               Intake/Output                       21 - 21 - 08/10/21 0659     [id number] 5177-4629 Total  Total                 Intake    P.O.  0  -- 0  --  -- --    P.O. 0 -- 0 -- -- --    I.V.  0  -- 0  --  -- --    Volume (mL) (magnesium sulfate in D5W IVPB premix 1 g) 0 -- 0 -- -- --    NG/GT  430  180 610  0  -- 0    Intake (mL) (Enteral Tube 21 Cortrak - Gastric Left nare) 430 180 610 0 -- 0    IV Piggyback  --  186.7 186.7  369.3  -- 369.3    Volume (mL) (levETIRAcetam (Keppra) 1500 mg in 100 mL NaCl IV premix)  -- 0 0 -- -- --    Volume (mL) (levETIRAcetam (Keppra) 1500 mg in 100 mL NaCl IV premix) -- 186.7 186.7 -- -- --    Volume (mL) (potassium phosphate IVPB 30 mmol in 500 mL D5W (premix)) -- -- -- 369.3 -- 369.3    Enteral  --  30 30  --  -- --    Free Water / Tube Flush -- 30 30 -- -- --    Total Intake 430 396.7 826.7 369.3 -- 369.3       Output    Urine  400  0 400  --  -- --    Number of Times Voided 3 x -- 3 x -- -- --    Urine Void (mL) 400 0 400 -- -- --    Drains  --  0 0  --  -- --    Residual Amount (mL) (Discarded) (Enteral Tube 08/08/21 Cortrak - Gastric Left nare) -- 0 0 -- -- --    Total Output 400 0 400 -- -- --       Net I/O     30 396.7 426.7 369.3 -- 369.3            Intake/Output Summary (Last 24 hours) at 8/9/2021 1015  Last data filed at 8/9/2021 1000  Gross per 24 hour   Intake 1095.97 ml   Output 300 ml   Net 795.97 ml            • levETIRAcetam (Keppra) IV  1,500 mg Q12HRS   • potassium phosphate  30 mmol Once   • artificial tears  1 Application PRN   • thiamine  100 mg DAILY   • sildenafil  20 mg TID   • LORazepam  2 mg Q10 MIN PRN   • phenytoin  100 mg TID   • Pharmacy  1 Each PHARMACY TO DOSE   • butalbital/apap/caffeine -40 mg  1 tablet Q6HRS PRN   • oxyCODONE immediate-release  5 mg Q3HRS PRN    Or   • oxyCODONE immediate-release  10 mg Q3HRS PRN   • melatonin  5 mg Nightly   • sertraline  100 mg DAILY   • senna-docusate  1 tablet Nightly   • senna-docusate  1 tablet Q24HRS PRN   • polyethylene glycol/lytes  1 Packet DAILY   • acetaminophen  650 mg Q6HRS PRN   • pravastatin  20 mg DAILY   • [Held by provider] ambrisentan  10 mg DAILY   • cloNIDine  0.1 mg Q4HRS PRN   • gabapentin  100 mg BID   • levothyroxine  137 mcg AM ES   • fludrocortisone  0.5 mg DAILY   • diphenhydrAMINE  25 mg Q6HRS PRN    Or   • diphenhydrAMINE  25 mg Q6HRS PRN   • magnesium hydroxide  30 mL QDAY PRN   • ALPRAZolam  0.5 mg TID PRN   • mycophenolate  250 mg BID   • tacrolimus  4 mg DAILY   • docusate sodium   100 mg BID   • omeprazole  40 mg DAILY   • Pharmacy Consult Request  1 Each PHARMACY TO DOSE   • MD ALERT...DO NOT ADMINISTER NSAIDS or ASPIRIN unless ORDERED By Neurosurgery  1 Each PRN   • ondansetron  4 mg Q4HRS PRN   • diphenhydrAMINE  25 mg Q6HRS PRN   • labetalol  10 mg Q HOUR PRN   • hydrALAZINE  10 mg Q HOUR PRN   • bisacodyl  10 mg Q24HRS PRN   • fleet  1 Each Once PRN   • benzocaine-menthol  1 Lozenge Q2HRS PRN   • [Held by provider] tadalafil  20 mg QHS       Assessment and Plan:  POD #5  Expressive and receptive aphasia.  Repeat head CT with larger left SDH with only 2mm MLS  Q 1 hour neuro checks  Monitor for continued seizure activity  Cont ICU care for now      Prophylactic anticoagulation: no         Start date/time: two weeks.

## 2021-08-09 NOTE — DIETARY
Nutrition support update:  Day 6 of admit.  Roxana Cuba is a 61 y.o. female with admitting DX of subdural hematoma.  Tube feeding initiated on 8/8. Current TF via Gastric Cortrak:  Fibersource HN, goal rate 55 ml/hr, providing 1584 kcals, 71 grams protein, 1069 mL free water.     Per MD note on 8/9: Dilantin was added  x3/day to Pt's MAR. TF must be on hold 2 hours before and 2 hours after each dose of Dilantin. Update: Discussed with Pharmacy Dilantin changed to IV. No need to change TF at this time.    Malnutrition risk: (Per RD note on 8/5): Pt meets criteria for severe malnutrition in context of acute illness related to poor appetite following intestinal surgery as evidenced by reported poor PO intake <50% of needs x 5 weeks with 5.8% weight loss x 5 weeks.    Recommendations/Plan:  1. Continue Fibersource HN TF rate to 55 mL/hr for 12 hours, providing 1584 kcals, 71 grams of protein and 1069 mL of free water.  2. RD to monitor and adjust TF as needed.  3. Diet advancement per SLP  4. Fluids per MD.    RD Following.

## 2021-08-09 NOTE — PROGRESS NOTES
2130 RN noticed patient with rhythmic jerking motion of entire body. Hands and toes curled lasting 15 seconds before resolving on own. No medication given. Unable to be arounsed after event, does not with draw to pain or open eyes. MD notified. Pupils sluggish and 5 mm.    2140 MD at bedside. Another episode witnessed. See MAR for medication administration details. CT head ordered.

## 2021-08-09 NOTE — PROGRESS NOTES
Rapid response called for episode of seizure tonic-clonic lasting approximately 15 seconds which resolved spontaneously.  I arrived bedside and witnessed another episode of generalized tonic-clonic seizure.  Patient is able to maintain her airway at this time.  Repeat CT head ordered stat which showed stable left subdural hematoma and stable left to right midline shift.  Neurosurgery updated, spoke with Dr. Hunter, recommended to start Dilantin.  We will continue to monitor in ICU with neuro checks every 1 hour.  Fall, seizure, aspiration precautions.

## 2021-08-10 ENCOUNTER — APPOINTMENT (OUTPATIENT)
Dept: RADIOLOGY | Facility: MEDICAL CENTER | Age: 61
DRG: 026 | End: 2021-08-10
Attending: STUDENT IN AN ORGANIZED HEALTH CARE EDUCATION/TRAINING PROGRAM
Payer: MEDICARE

## 2021-08-10 PROBLEM — R56.9 SEIZURES (HCC): Status: ACTIVE | Noted: 2021-08-10

## 2021-08-10 LAB
APTT PPP: 30.4 SEC (ref 24.7–36)
INR PPP: 0.98 (ref 0.87–1.13)
PROTHROMBIN TIME: 12.7 SEC (ref 12–14.6)

## 2021-08-10 PROCEDURE — 700111 HCHG RX REV CODE 636 W/ 250 OVERRIDE (IP): Performed by: PSYCHIATRY & NEUROLOGY

## 2021-08-10 PROCEDURE — 99233 SBSQ HOSP IP/OBS HIGH 50: CPT | Performed by: STUDENT IN AN ORGANIZED HEALTH CARE EDUCATION/TRAINING PROGRAM

## 2021-08-10 PROCEDURE — 76937 US GUIDE VASCULAR ACCESS: CPT

## 2021-08-10 PROCEDURE — 85610 PROTHROMBIN TIME: CPT

## 2021-08-10 PROCEDURE — A9270 NON-COVERED ITEM OR SERVICE: HCPCS | Performed by: STUDENT IN AN ORGANIZED HEALTH CARE EDUCATION/TRAINING PROGRAM

## 2021-08-10 PROCEDURE — 99223 1ST HOSP IP/OBS HIGH 75: CPT | Mod: GC | Performed by: PSYCHIATRY & NEUROLOGY

## 2021-08-10 PROCEDURE — B54MZZA ULTRASONOGRAPHY OF RIGHT UPPER EXTREMITY VEINS, GUIDANCE: ICD-10-PCS | Performed by: STUDENT IN AN ORGANIZED HEALTH CARE EDUCATION/TRAINING PROGRAM

## 2021-08-10 PROCEDURE — 700111 HCHG RX REV CODE 636 W/ 250 OVERRIDE (IP): Performed by: STUDENT IN AN ORGANIZED HEALTH CARE EDUCATION/TRAINING PROGRAM

## 2021-08-10 PROCEDURE — 85730 THROMBOPLASTIN TIME PARTIAL: CPT

## 2021-08-10 PROCEDURE — A9270 NON-COVERED ITEM OR SERVICE: HCPCS | Performed by: HOSPITALIST

## 2021-08-10 PROCEDURE — 700105 HCHG RX REV CODE 258: Performed by: PSYCHIATRY & NEUROLOGY

## 2021-08-10 PROCEDURE — 05H933Z INSERTION OF INFUSION DEVICE INTO RIGHT BRACHIAL VEIN, PERCUTANEOUS APPROACH: ICD-10-PCS | Performed by: STUDENT IN AN ORGANIZED HEALTH CARE EDUCATION/TRAINING PROGRAM

## 2021-08-10 PROCEDURE — 770022 HCHG ROOM/CARE - ICU (200)

## 2021-08-10 PROCEDURE — 700102 HCHG RX REV CODE 250 W/ 637 OVERRIDE(OP): Performed by: STUDENT IN AN ORGANIZED HEALTH CARE EDUCATION/TRAINING PROGRAM

## 2021-08-10 PROCEDURE — 700102 HCHG RX REV CODE 250 W/ 637 OVERRIDE(OP): Performed by: HOSPITALIST

## 2021-08-10 PROCEDURE — 700111 HCHG RX REV CODE 636 W/ 250 OVERRIDE (IP): Performed by: HOSPITALIST

## 2021-08-10 PROCEDURE — C9254 INJECTION, LACOSAMIDE: HCPCS | Performed by: PSYCHIATRY & NEUROLOGY

## 2021-08-10 RX ORDER — FUROSEMIDE 20 MG/1
20 TABLET ORAL DAILY
Status: DISCONTINUED | OUTPATIENT
Start: 2021-08-11 | End: 2021-08-14

## 2021-08-10 RX ADMIN — ACETAMINOPHEN 650 MG: 325 TABLET, FILM COATED ORAL at 15:00

## 2021-08-10 RX ADMIN — FLUDROCORTISONE ACETATE 0.5 MG: 0.1 TABLET ORAL at 05:41

## 2021-08-10 RX ADMIN — LEVETIRACETAM INJECTION 1500 MG: 15 INJECTION INTRAVENOUS at 17:08

## 2021-08-10 RX ADMIN — MYCOPHENOLATE MOFETIL 250 MG: 200 POWDER, FOR SUSPENSION ORAL at 05:41

## 2021-08-10 RX ADMIN — GABAPENTIN 100 MG: 100 CAPSULE ORAL at 17:07

## 2021-08-10 RX ADMIN — FUROSEMIDE 20 MG: 40 TABLET ORAL at 05:42

## 2021-08-10 RX ADMIN — PHENYTOIN SODIUM 100 MG: 50 INJECTION INTRAMUSCULAR; INTRAVENOUS at 05:43

## 2021-08-10 RX ADMIN — OXYCODONE HYDROCHLORIDE 10 MG: 10 TABLET ORAL at 05:42

## 2021-08-10 RX ADMIN — SILDENAFIL 20 MG: 20 TABLET ORAL at 17:08

## 2021-08-10 RX ADMIN — SODIUM CHLORIDE 100 MG: 9 INJECTION, SOLUTION INTRAVENOUS at 12:28

## 2021-08-10 RX ADMIN — MYCOPHENOLATE MOFETIL 250 MG: 200 POWDER, FOR SUSPENSION ORAL at 17:07

## 2021-08-10 RX ADMIN — Medication 100 MG: at 05:42

## 2021-08-10 RX ADMIN — SILDENAFIL 20 MG: 20 TABLET ORAL at 12:28

## 2021-08-10 RX ADMIN — BUTALBITAL, ACETAMINOPHEN, AND CAFFEINE 1 TABLET: 50; 325; 40 TABLET ORAL at 12:28

## 2021-08-10 RX ADMIN — SODIUM CHLORIDE 100 MG: 9 INJECTION, SOLUTION INTRAVENOUS at 18:08

## 2021-08-10 RX ADMIN — ACETAMINOPHEN 650 MG: 325 TABLET, FILM COATED ORAL at 08:44

## 2021-08-10 RX ADMIN — SILDENAFIL 20 MG: 20 TABLET ORAL at 05:42

## 2021-08-10 RX ADMIN — OMEPRAZOLE 40 MG: KIT at 06:00

## 2021-08-10 RX ADMIN — OXYCODONE HYDROCHLORIDE 10 MG: 10 TABLET ORAL at 02:00

## 2021-08-10 RX ADMIN — PRAVASTATIN SODIUM 20 MG: 20 TABLET ORAL at 05:42

## 2021-08-10 RX ADMIN — LEVETIRACETAM INJECTION 1500 MG: 15 INJECTION INTRAVENOUS at 05:40

## 2021-08-10 RX ADMIN — LEVOTHYROXINE SODIUM 137 MCG: 0.03 TABLET ORAL at 05:41

## 2021-08-10 RX ADMIN — ACETAMINOPHEN 650 MG: 325 TABLET, FILM COATED ORAL at 00:14

## 2021-08-10 RX ADMIN — OXYCODONE 5 MG: 5 TABLET ORAL at 15:00

## 2021-08-10 RX ADMIN — OXYCODONE HYDROCHLORIDE 10 MG: 10 TABLET ORAL at 19:41

## 2021-08-10 RX ADMIN — Medication 5 MG: at 21:50

## 2021-08-10 RX ADMIN — SERTRALINE HYDROCHLORIDE 100 MG: 100 TABLET ORAL at 05:41

## 2021-08-10 RX ADMIN — GABAPENTIN 100 MG: 100 CAPSULE ORAL at 05:43

## 2021-08-10 RX ADMIN — TACROLIMUS 4 MG: 5 CAPSULE ORAL at 17:07

## 2021-08-10 ASSESSMENT — ENCOUNTER SYMPTOMS
HEADACHES: 1
SEIZURES: 1
DOUBLE VISION: 0
DIZZINESS: 0
SPEECH CHANGE: 0
NECK PAIN: 0
BACK PAIN: 0
COUGH: 0
BLURRED VISION: 0
MYALGIAS: 1
SHORTNESS OF BREATH: 0
ABDOMINAL PAIN: 0
FOCAL WEAKNESS: 0

## 2021-08-10 NOTE — ASSESSMENT & PLAN NOTE
8/8 - 8/9 3AM with multiple seizures episodes  ?cortical irritation from SDH    In addition to keppra, dilatin added  Today 8/11 - now essentially seizures free for about 48hrs    Neurology consult Dr. Hemphill requested to help with seizures management - dilantin switched to vimpat given hx of liver transplant.   8/12- remain seizure free and stable. Continue to monitor   8/16- remain seizure free

## 2021-08-10 NOTE — PROGRESS NOTES
Neurosurgery Progress Note    Subjective:  S/p fall with left SDH.  POD#6 left craniotomy for SDH  Seizure activity overnight /. Keppra and dilantin on board.   Neurology consulted and Dr. Hemphill following     Mentation improving - AOx3-4 and to situation; speech quite clear; has occasional trouble findings words.         Exam:  Awake and alert  Alert and oriented x 3  EOMI, PERRL.  Moves extremities x4.  Slight R pronator drift   Incision intact  Has some edema to left eye as expected.     BP  Min: 83/50  Max: 126/58  Pulse  Av.9  Min: 55  Max: 87  Resp  Av.9  Min: 12  Max: 30  Temp  Av.4 °C (97.6 °F)  Min: 36.3 °C (97.3 °F)  Max: 36.9 °C (98.5 °F)  SpO2  Av.5 %  Min: 92 %  Max: 100 %    No data recorded    Recent Labs     21   WBC 6.4 5.3   RBC 2.90* 3.02*   HEMOGLOBIN 9.0* 9.4*   HEMATOCRIT 27.4* 28.3*   MCV 94.5 93.7   MCH 31.0 31.1   MCHC 32.8* 33.2*   RDW 56.2* 55.0*   PLATELETCT 141* 143*   MPV 9.3 9.2     Recent Labs     21  0223   SODIUM 133* 134*   POTASSIUM 3.9 3.8   CHLORIDE 98 98   CO2 26 28   GLUCOSE 133* 155*   BUN 6* 5*   CREATININE 0.32* 0.35*   CALCIUM 8.0* 7.8*     Recent Labs     08/10/21  0920   APTT 30.4   INR 0.98           Intake/Output                       21 07 - 08/10/21 0659 08/10/21 07 - 21 0659      Total  Total                 Intake    Other  350  60 410  --  -- --    Medications (PO/Enteral Liquids) 350 60 410 -- -- --    NG/GT  200  660 860  110  -- 110    Intake (mL) (Enteral Tube 21 Cortrak - Gastric Left nare) 200 660 860 110 -- 110    IV Piggyback  499.8  -- 499.8  --  -- --    Volume (mL) (potassium phosphate IVPB 30 mmol in 500 mL D5W (premix)) 499.8 -- 499.8 -- -- --    Enteral  --  90 90  --  -- --    Free Water / Tube Flush -- 90 90 -- -- --    Total Intake 1049.8 810 1859.8 110 -- 110       Output    Urine  1000  800 1800  --  -- --    Number  of Times Voided -- -- -- 1 x -- 1 x    Number of Times Incontinent of Urine -- -- -- 1 x -- 1 x    Output (mL) (External Urinary Catheter (Female Wick)) 1833 186 4738 -- -- --    Stool  --  -- --  --  -- --    Number of Times Stooled -- 1 x 1 x -- -- --    Total Output 4770 660 1579 -- -- --       Net I/O     49.8 10 59.8 110 -- 110            Intake/Output Summary (Last 24 hours) at 8/10/2021 1029  Last data filed at 8/10/2021 1000  Gross per 24 hour   Intake 1600.5 ml   Output 1800 ml   Net -199.5 ml            • levETIRAcetam (Keppra) IV  1,500 mg Q12HRS   • artificial tears  1 Application PRN   • phenytoin  100 mg Q8HRS   • furosemide  20 mg DAILY   • thiamine  100 mg DAILY   • sildenafil  20 mg TID   • LORazepam  2 mg Q10 MIN PRN   • Pharmacy  1 Each PHARMACY TO DOSE   • butalbital/apap/caffeine -40 mg  1 tablet Q6HRS PRN   • oxyCODONE immediate-release  5 mg Q3HRS PRN    Or   • oxyCODONE immediate-release  10 mg Q3HRS PRN   • melatonin  5 mg Nightly   • sertraline  100 mg DAILY   • senna-docusate  1 tablet Nightly   • senna-docusate  1 tablet Q24HRS PRN   • polyethylene glycol/lytes  1 Packet DAILY   • acetaminophen  650 mg Q6HRS PRN   • pravastatin  20 mg DAILY   • [Held by provider] ambrisentan  10 mg DAILY   • cloNIDine  0.1 mg Q4HRS PRN   • gabapentin  100 mg BID   • levothyroxine  137 mcg AM ES   • fludrocortisone  0.5 mg DAILY   • diphenhydrAMINE  25 mg Q6HRS PRN    Or   • diphenhydrAMINE  25 mg Q6HRS PRN   • magnesium hydroxide  30 mL QDAY PRN   • ALPRAZolam  0.5 mg TID PRN   • mycophenolate  250 mg BID   • tacrolimus  4 mg DAILY   • docusate sodium  100 mg BID   • omeprazole  40 mg DAILY   • Pharmacy Consult Request  1 Each PHARMACY TO DOSE   • MD ALERT...DO NOT ADMINISTER NSAIDS or ASPIRIN unless ORDERED By Neurosurgery  1 Each PRN   • ondansetron  4 mg Q4HRS PRN   • diphenhydrAMINE  25 mg Q6HRS PRN   • labetalol  10 mg Q HOUR PRN   • hydrALAZINE  10 mg Q HOUR PRN   • bisacodyl  10 mg Q24HRS PRN    • fleet  1 Each Once PRN   • benzocaine-menthol  1 Lozenge Q2HRS PRN   • [Held by provider] tadalafil  20 mg QHS       Assessment and Plan:  POD #6  Improving mentation  Seizures have stabilized at this point  Repeat head CT with larger left SDH with only 2mm MLS  Q2 hour neuro checks OK  Monitor for continued seizure activity  Cont ICU care for now   Dr. Arnold to discuss possible washout with family      Prophylactic anticoagulation: no         Start date/time: two weeks.

## 2021-08-10 NOTE — CARE PLAN
Problem: Nutritional:  Goal: Nutrition support tolerated and meeting greater than 85% of estimated needs  Outcome: Met   TF held for ultrasound this AM, previously noted in flowsheets Fibersource @ 55 mL/h (goal rate).    Resume TF as appropriate. RD continues to follow.

## 2021-08-10 NOTE — PROCEDURES
Vascular Access Team    Date of Insertion: 8/10/2021  Arm Circumference: n/a  Line Length: 10cm  Line Size: 18g  Vein Occupancy %: 31  Reason for Midline: access  Labs: WBC 5.3, , BUN 5, Cr 0.35, GFR >60, INR 1.13    Orders confirmed, vessel patency confirmed with ultrasound. Risks and benefits of procedure explained to patient and education regarding line associated bloodstream infections provided. Questions answered.     PowerGlide Midline placed in RUE per licensed provider order with ultrasound guidance. 18g, 10 cm line placed in brachial vein after 1 attempt(s).  Catheter inserted with brisk blood return. Secured with 0cm external from insertion site.  Line flushed without resistance with 10 mL 0.9% normal saline.  Midline secured with Biopatch and Tegaderm.     Midline placement is confirmed by nurse using ultrasound and ability to flush and draw blood. Midline is appropriate for use at this time.  No X-ray is needed for placement confirmation. Pt tolerated procedure well.  Patient condition relayed to unit RN or ordering physician via this post procedure note in the EMR.    Ultrasound images uploaded to PACS and viewable in the EMR - yes  Ultrasound imaged printed and placed in paper chart - no      BARD PowerGlide Midline ref # [id number], Lot # [id number], Expiration Date 3/31/2022

## 2021-08-10 NOTE — PROGRESS NOTES
Residual SDH may be causing cortical irritation. Recommend neurology consult given two agents now, will discuss with family about utility of washout of residual blood tomorrow if seizure control cannot be obtained with neurology input and mgmt of medications

## 2021-08-10 NOTE — CARE PLAN
Problem: Pain - Standard  Goal: Alleviation of pain or a reduction in pain to the patient’s comfort goal  Outcome: Progressing     Problem: Safety - Medical Restraint  Goal: Free from restraint(s) (Restraint for Interference with Medical Device)  Outcome: Met     The patient is Watcher - Medium risk of patient condition declining or worsening    Shift Goals  Clinical Goals: Neurostability  Patient Goals: Speak more fluently  Family Goals: Comfort, return to baseline    Progress made toward(s) clinical / shift goals:  Patient able to effectively communicate needs and pain. Pain treat by the 0-10 score per MAR.

## 2021-08-10 NOTE — CARE PLAN
The patient is Watcher - Medium risk of patient condition declining or worsening    Shift Goals  Clinical Goals: seizure control, stable neuro exam  Patient Goals: ADAM  Family Goals: Comfort    Progress made toward(s) clinical / shift goals:    Problem: Pain - Standard  Goal: Alleviation of pain or a reduction in pain to the patient’s comfort goal  Outcome: Progressing     Problem: Fall Risk  Goal: Patient will remain free from falls  Outcome: Progressing     Problem: Psychosocial  Goal: Patient's ability to verbalize feelings about condition will improve  Outcome: Progressing

## 2021-08-10 NOTE — PROGRESS NOTES
Patient complaining of a 10/10 headache and is tearful. Dr Oropeza notified. No new orders received.

## 2021-08-10 NOTE — PROGRESS NOTES
"Hospital Medicine Daily Progress Note    Date of Service  8/10/2021    Chief Complaint  Roxana Cuba is a 61 y.o. female admitted 8/3/2021 with SDH    Hospital Course  Roxana Cuba is a 61 y.o. female with complex PMHx significant for COPD, chronic hypoxemic respiratory failure on 3L NC, pulmonary HTN, liver transplantation in 2011 for etoh cirrhosis, granulomatous hepatitis/sarcoid, Evon-en-Y gastric bypass 2018, MGUS, CKD, ?adrenal insufficiency, hypothyroidism who presented 8/3/2021 with worsening HA and visual changes x4 weeks after being diagnosed with a SDH secondary to ground level, mechanical fall 5 weeks ago while she was in [facility]. Patient also had laparotomy for \"twisting of the bowel\" in [facility] 5 weeks ago. No prior imaging available, but MRI done here reveals 29 mm subacute/chronic L SDH and SAH adjacent to L frontal/temporal lobes with 6 mm L->R shift. NSG was consulted and brought the patient to the OR and performed left craniotomy for a large subdural hematoma with membrane formation. Subgaleal drain was placed. Patient did have some hypotension in the ICU but this resolved. Her magnesium was repleted. Thus she was able to be transferred out to the neurosurgical floor. She had some constipation so her MiraLAX was scheduled. Discontinued maintenance IVF.  She was preparing to discharge home with home health and oxygen when she developed intermittent confusion and slurred speech overnight so repeat head CT showed increase in SDH to 14 mm and 2 mm left to right midline shift.  Patient was transferred back to the ICU per neurosurgery.  On 8/8, patient had recurrent seizures so Dilantin was added.  Her home Lasix was resumed on 8/9.    Interval Problem Update  8/5: Internal medicine consulted and transferred out of ICU  8/6: Stable and preparing for discharge.  However, overnight, patient developed intermittent confusion and slurred speech.  Head CT ordered.  8/7: Head CT showed that the SDH had " now increased to 14 mm in width.    8/8: With receptive and expressive aphasia.  Unable to perform swallow eval.  Substituting liquid sildenafil for PAH meds.  Discussed with pulmonologist Dr. Grady. Repleting magnesium and started on oral thiamine. Still in restraints.  8/9: Patient is critically ill - Multiple seizures overnight. Started on Dilantin.  Chest x-ray showed pulmonary edema.  BNP elevated at 4372.  Given a dose of Lasix.  Repleting phosphorus.  Checking urinalysis given dysuria.    8/10: BP soft overnight but no seizures (last noted 8/9 3AM). Mentation improving - AOx3-4 and to situation; speech quite clear; has occasional trouble findings words. NSX follow up - to discuss with family about possible washout of residual blood. Neurology consult Dr. Hemphill requested to help with seizures management.       I have personally seen and examined the patient at bedside. I discussed the plan of care with patient and bedside RN.    Consultants/Specialty  Neurosurgeon Dr. Arnold  Critical care Dr. Flores  Neurology Dr. Hemphill  Diiscussed with Pulmonologist Dr. Grady    Code Status  Full Code    Disposition  Patient is not medically cleared.   Anticipate discharge to - pending transfer out of the ICU. Tentatively planned for 8/11.   I have placed the appropriate orders for post-discharge needs.    Review of Systems  Review of Systems   Constitutional: Positive for malaise/fatigue.   Eyes: Negative for blurred vision and double vision.   Respiratory: Negative for cough and shortness of breath.    Cardiovascular: Negative for chest pain.   Gastrointestinal: Negative for abdominal pain.   Genitourinary: Negative for dysuria.   Musculoskeletal: Positive for myalgias. Negative for back pain and neck pain.   Neurological: Positive for seizures (Last 8/8 3 AM) and headaches. Negative for dizziness, speech change and focal weakness.        Physical Exam  Temp:  [36.3 °C (97.3 °F)-37 °C (98.6 °F)] 36.4 °C (97.6  °F)  Pulse:  [55-87] 65  Resp:  [12-30] 30  BP: ()/(48-73) 91/53  SpO2:  [92 %-100 %] 95 %    Physical Exam  Constitutional:       General: She is not in acute distress.     Appearance: She is well-developed.   HENT:      Head: Normocephalic.      Nose:      Comments: Cortrak in place     Mouth/Throat:      Mouth: Mucous membranes are dry.   Eyes:      General: No scleral icterus.     Conjunctiva/sclera: Conjunctivae normal.   Cardiovascular:      Rate and Rhythm: Normal rate.      Pulses: Normal pulses.      Heart sounds: No murmur heard.     Pulmonary:      Effort: Pulmonary effort is normal. No respiratory distress.      Breath sounds: No wheezing or rales.   Abdominal:      General: There is no distension.      Palpations: Abdomen is soft.      Tenderness: There is no abdominal tenderness.   Musculoskeletal:         General: Normal range of motion.      Cervical back: Normal range of motion and neck supple.      Right lower leg: Edema (trace) present.      Left lower leg: Edema (trace) present.   Skin:     General: Skin is warm.      Comments: Head staples in place CDI   Neurological:      Mental Status: She is alert.      Comments: AOx3-4 and to situation  Trouble finding words at time  Speech reasonable clear  Grossly intact neuro exam   Psychiatric:         Mood and Affect: Mood normal.         Fluids    Intake/Output Summary (Last 24 hours) at 8/10/2021 0811  Last data filed at 8/10/2021 0800  Gross per 24 hour   Intake 1767.1 ml   Output 1800 ml   Net -32.9 ml       Laboratory  Recent Labs     08/08/21 0417 08/09/21 0223   WBC 6.4 5.3   RBC 2.90* 3.02*   HEMOGLOBIN 9.0* 9.4*   HEMATOCRIT 27.4* 28.3*   MCV 94.5 93.7   MCH 31.0 31.1   MCHC 32.8* 33.2*   RDW 56.2* 55.0*   PLATELETCT 141* 143*   MPV 9.3 9.2     Recent Labs     08/08/21 0417 08/09/21 0223   SODIUM 133* 134*   POTASSIUM 3.9 3.8   CHLORIDE 98 98   CO2 26 28   GLUCOSE 133* 155*   BUN 6* 5*   CREATININE 0.32* 0.35*   CALCIUM 8.0* 7.8*                    Imaging  DX-CHEST-PORTABLE (1 VIEW)   Final Result         1.  Interstitial pulmonary parenchymal prominence suggest chronic underlying lung disease, component of interstitial edema and/or infiltrates not excluded.   2.  Small left pleural effusion   3.  Cardiomegaly   4.  Atherosclerosis      CT-HEAD W/O   Final Result         1.  Left subdural hematoma, stable since prior study.   2.  3.3 mm left right midline shift, similar compared to prior study.   3.  Minimal pneumocephalus, stable to slightly decreased since prior study.   4.  Nonspecific white matter changes, commonly associated with small vessel ischemic disease.  Associated mild cerebral atrophy is noted.   5.  Atherosclerosis.      DX-ABDOMEN FOR TUBE PLACEMENT   Final Result      Feeding tube tip is stable in left abdomen in this patient with gastric bypass surgery      DX-ABDOMEN FOR TUBE PLACEMENT   Final Result      Feeding tube tip projects over the expected location of the proximal small bowel in this patient status post gastric bypass surgery      DX-ABDOMEN FOR TUBE PLACEMENT   Final Result      Enteric tube projects over the stomach.      CT-HEAD W/O   Final Result      1.  Again seen left-sided holohemispheric subdural hemorrhage with mixed density components and gas within overlying craniotomy. This measures 14 mm in depth and is unchanged.      2.  Again seen atrophy.      CT-HEAD W/O   Final Result      1.  Interval increase in size in left frontal subdural hematoma measuring up to 14 mm in width with mass effect and midline shift from left to right of 2 mm.      2.  This was discussed with Physician: MAIDA PRITCHARD at 12:50 AM.      EC-ECHOCARDIOGRAM COMPLETE W/O CONT   Final Result      CT-HEAD W/O   Final Result      1.  Status post left frontal temporal craniotomy with evacuation of left subdural hematoma. Minimal residual hemorrhage and air is present in the left subdural space.      2.  No residual left to right midline  shift.      3.  Underlying atrophy and small vessel ischemic changes again noted.      CT-HEAD W/O   Final Result      1.  Stable appearance of left frontal subdural hematoma with residual 7.5 mm left-to-right midline shift.      2.  No new hemorrhage.      3.  No herniation.      DX-CHEST-PORTABLE (1 VIEW)   Final Result      1.  Left basilar opacity may represent atelectasis or consolidation.   2.  Small left pleural effusion may be present.   3.  Atherosclerotic plaque.      MR-BRAIN-W/O   Final Result   Addendum 1 of 1   Findings were discussed with ROBERT BRYANT on 8/3/2021 1:44 PM.      Final      1.  There is subacute/chronic subdural and subarachnoid hemorrhages adjacent to the left frontal and temporal lobes. The maximum transverse dimension measures an approximately 29 mm. There is an approximately 6 mm midline shift towards right side.   2.  Mild cerebral volume loss.   3.  Mild chronic microvascular ischemic disease.      CT-HEAD W/O   Final Result      23 mm thick mixed density left frontotemporal convexity subdural hematoma with mass effect including 8 mm rightward midline shift/subfalcine herniation.      These findings were discussed with ROBERT BRYANT on 8/3/2021 9:01 AM.      DX-ABDOMEN COMPLETE WITH AP OR PA CXR   Final Result      1.  Small left pleural effusion.   2.  Moderate amount of colonic stool.   3.  No evidence of bowel obstruction.   4.  Scoliosis.      IR-MIDLINE CATHETER INSERTION WO GUIDANCE > AGE 3    (Results Pending)        Assessment/Plan  * Traumatic subdural hematoma without loss of consciousness (HCC)- (present on admission)  Assessment & Plan  Secondary to GLF 5 weeks ago.   MRI 8/3: 29 mm L SDH w/ 6 mm L->R shift.   8/4 L craniotomy with subgaleal drain  Neurochecks  levetiracetam for seizure ppx, hold dvt ppx.  Drain removed by neurosurgery   On 8/7 developed worsening SDH confirmed on imaging along with slurred speech and then aphasia  Transferred back to ICU 8/7 for  close monitoring    NSX follow up - to discuss with family about possible washout of residual blood.      Seizures (HCC)  Assessment & Plan  8/8 - 8/9 3AM with multiple seizures episodes  ?cortical irritation from SDH    In addition to keppra, dilatin added  Today 8/10 - now essentially seizures free for about 24hrs    Neurology consult Dr. Hemphill requested to help with seizures management.     Dysphagia following nontraumatic intracerebral hemorrhage  Assessment & Plan  Cortrak placed  C/w SLP routine evals    Eye swelling, left- (present on admission)  Assessment & Plan  From recent surgery   Ice   No need for antibiotic     Resolving    [place]'s disease (HCC)- (present on admission)  Assessment & Plan  Continue fludrocortisone  With some hypotension in ICU  Monitor blood pressures    GERD (gastroesophageal reflux disease)- (present on admission)  Assessment & Plan  Continue PPI    Pulmonary hypertension (HCC)- (present on admission)  Assessment & Plan  Chronic, not in acute RHF.   Echo 8/5 unremarkable and unchanged from September 2018. RSVP 35 mmHg  Previously on home ambrisentan and tadalafil.  Unable to get above meds due to cortrak  Consulted pulmonologist Dr. Grady  Substitute with liquid sildenafil for now per pulm    Considered bosentan 62.5 but patient has liver transplant so will hold off to avoid hepatotoxicity    Monitor patient's oxygen requirements and fluid status.  If patient becomes more hypoxic, check BNP as well as chest x-ray.  If those are worsened with, start diuresis and re-check echo.  If PA pressure greater than 45 on repeat echo, notify pulmonology.    Gentle diuresis as BP tolerates    Chronic respiratory failure with hypoxia (HCC)- (present on admission)  Assessment & Plan  CXR showed L basilar opacity likely atelectasis. IS.   Home oxygen arrangement     Acquired hypothyroidism- (present on admission)  Assessment & Plan  Continue levothyroxine    Status post liver transplant   [facility] (present on admission)  Overview  -December 2011: Status post liver transplant for end stage liver disease at [facility] [place] followed by Dr. [place].        Assessment & Plan  For alcoholic cirrhosis  Continue home tacrolimus and MMF       VTE prophylaxis: SCDs/TEDs    I have performed a physical exam and reviewed and updated ROS and Plan today (8/10/2021).

## 2021-08-10 NOTE — PROGRESS NOTES
MD notified regarding hypotension and updated on improved mental status. Will continue to monitor.

## 2021-08-10 NOTE — PROGRESS NOTES
Patient pulled out IV. Dr. Oropeza notified of loss of IV access. Verbal order received for midline access.

## 2021-08-10 NOTE — THERAPY
Missed Therapy     Patient Name: Roxana Cuba  Age:  61 y.o., Sex:  female  Medical Record #: [id number]  Today's Date: 8/10/2021       08/10/21 1442   Treatment Variance   Reason For Missed Therapy Non-Medical - Patient Refused   Interdisciplinary Plan of Care Collaboration   IDT Collaboration with  Nursing   Collaboration Comments Attempted OT tx, however pt declined stating she has a 9/10 headache. Nsg aware and providing pain meds. Will attempt again as able.

## 2021-08-10 NOTE — CONSULTS
"Neurology Initial Consult H&P    Referring Physician: Jose Oropeza M.D.    Chief Complaint   Patient presents with   • Shortness of Breath     x 2 days. Patient states she has used 3L NC at home for the past 10 years. Patient states she has a \"pulmonary syndrome.\" Patient is 97% on RA. Patient reports her saturation decreased when laying down.    • Skin Lesion     Patient reports sarcoids x 10 years    • Headache     x 3 weeks       HPI: Roxana Cuba is a 61 y.o. female with history of COPD (on 3L oxygen), pulmonary HTN, OLT (2011 for alcoholic cirrhosis and granulomatous hepatitis/sarcoid), stage 3 CKD, GERD, Evon-en-Y gastric bypass (2018), MGUS, ?adrenal insufficiency, and hypothyroidism who presented to the hospital on 8/3/2021 for headache x 4 wks after SDH 2/2 mechanical GLF in [facility], along with nausea/vomiting, and vision changes. MRI showed 29 mm subacute/chronic L subdural hematoma and subarachnoid hemorrhage with L fronta/temporal lobes with 6 mm left-to-right shift. Pt underwent craniotomy and drain placement on 8/4/2021. On the night of 8/6, pt developed confusion and slurred speech; CTH showed SDH that had increased to 14 mm in width. Patient then developed expressive aphasia on 8/8 and had multiple seizures on 8/9. Patient was placed on keppra and dilantin per neurosurgery in the context of residual SDH causing cortical irritation. Neurology was consulted for seizure control and medication management.    Interval history:  Today, POD#6 of craniotomy. NAEON. Today, patient cites a persistent headache alleviated by Tylenol and tingling in her R foot. No chest pain or shortness of breath. She remembers what brought her to the hospital and her history of GLF.    Review of systems: In addition to what is detailed in the HPI above, (and scanned into the chart if and when applicable), all other systems reviewed and are negative.    Past Medical History:    has a past medical history of Anemia, Anesthesia, " Breath shortness, Bronchitis ( ), Cardiomegaly, Chronic fatigue and malaise, Cirrhosis (HCC) (December 2011), CKD (chronic kidney disease) stage 3, GFR 30-59 ml/min (HCC), Diabetes (HCC), Fracture of left foot, GERD (gastroesophageal reflux disease), H/O Clostridium difficile infection (02-17-17), Hemorrhagic disorder (HCC), Hiatus hernia syndrome, High cholesterol, Hypothyroid, Jaundice (2009), Liver transplanted (HCC), Low back pain (02-17-17), Mild aortic regurgitation and aortic valve sclerosis ( ), On home oxygen therapy, Platelet disorder (HCC), Pneumonia, Psychiatric disorder, Pulmonary hypertension (HCC), S/P cholecystectomy, Small bowel obstruction (HCC), Splenomegaly, and VRE (vancomycin-resistant Enterococci).    FHx:  family history includes Alcohol/Drug in her maternal grandfather, maternal grandmother, and mother; Cancer in her paternal aunt; Diabetes in her father; Heart Disease in her father; Hyperlipidemia in her father and mother; Hypertension in her father; Non-contributory in her mother; Other in her father; Stroke in her father.    SHx:   reports that she has never smoked. She has never used smokeless tobacco. She reports that she does not drink alcohol and does not use drugs.    Allergies:  Allergies   Allergen Reactions   • Rhubarb Anaphylaxis   • Nsaids      Can not take due to hx of liver transplant    • Organic Nitrates      Nitroglycerin products should be avoided with the use of PDE-5 inhibitors as the combination can result in severe hypotension.  RD clarified with pt: Nitrates in food are okay and pt does not want nitrates to be listed as food allergy. Pt only avoids medications with nitrates.    • Other Drug      Any medication that may interact with cyclosporine, tacrolimus, sirolimus, or prograf (due to hx of liver transplant)    • Vancomycin Hcl      Causes red man syndrome when infused to fast         Medications:    Current Facility-Administered Medications:   •  lacosamide  (VIMPAT) 100 mg in  mL ivpb, 100 mg, Intravenous, Q12HRS, Anderson Hemphill M.D.  •  levETIRAcetam (Keppra) 1500 mg in 100 mL NaCl IV premix, 1,500 mg, Intravenous, Q12HRS, Ketan Jeffery M.D., Stopped at 08/10/21 0555  •  artificial tears (EYE LUBRICANT) ophth ointment 1 Application, 1 Application, Both Eyes, PRN, Sebas Odell M.D.  •  furosemide (LASIX) tablet 20 mg, 20 mg, Oral, DAILY, Sebas Odell M.D., 20 mg at 08/10/21 0542  •  thiamine (Vitamin B-1) tablet 100 mg, 100 mg, Enteral Tube, DAILY, Sebas Odell M.D., 100 mg at 08/10/21 0542  •  sildenafil (REVATIO) tablet 20 mg, 20 mg, Enteral Tube, TID, Sebas Odell M.D., 20 mg at 08/10/21 0542  •  LORazepam (ATIVAN) injection 2 mg, 2 mg, Intravenous, Q10 MIN PRN, Ketan Jeffery M.D., 2 mg at 08/09/21 0120  •  Pharmacy Consult: Enteral tube insertion - review meds/change route/product selection, 1 Each, Other, PHARMACY TO DOSE, Andriy Poon M.D.  •  butalbital/apap/caffeine -40 mg (Fioricet) per tablet 1 tablet, 1 tablet, Enteral Tube, Q6HRS PRN, Andriy Poon M.D.  •  oxyCODONE immediate-release (ROXICODONE) tablet 5 mg, 5 mg, Enteral Tube, Q3HRS PRN **OR** oxyCODONE immediate release (ROXICODONE) tablet 10 mg, 10 mg, Enteral Tube, Q3HRS PRN, 10 mg at 08/10/21 0542 **OR** [DISCONTINUED] HYDROmorphone (Dilaudid) injection 0.5 mg, 0.5 mg, Intravenous, Q3HRS PRN, Andriy Poon M.D., 0.5 mg at 08/08/21 0258  •  melatonin tablet 5 mg, 5 mg, Enteral Tube, Nightly, Andriy Poon M.D., 5 mg at 08/09/21 2008  •  sertraline (Zoloft) tablet 100 mg, 100 mg, Enteral Tube, DAILY, Andriy Poon M.D., 100 mg at 08/10/21 0541  •  senna-docusate (PERICOLACE or SENOKOT S) 8.6-50 MG per tablet 1 tablet, 1 tablet, Enteral Tube, Nightly, Andriy Poon M.D., 1 tablet at 08/07/21 2130  •  senna-docusate (PERICOLACE or SENOKOT S) 8.6-50 MG per tablet 1 tablet, 1 tablet, Enteral Tube, Q24HRS PRN, Andriy Poon M.D.  •  polyethylene glycol/lytes  (MIRALAX) PACKET 1 Packet, 1 Packet, Enteral Tube, DAILY, Andriy Poon M.D.  •  acetaminophen (Tylenol) tablet 650 mg, 650 mg, Enteral Tube, Q6HRS PRN, Andriy Poon M.D., 650 mg at 08/10/21 0844  •  pravastatin (PRAVACHOL) tablet 20 mg, 20 mg, Enteral Tube, DAILY, Andriy Poon M.D., 20 mg at 08/10/21 0542  •  [Held by provider] ambrisentan (LETAIRIS) TABS 10 mg, 10 mg, Oral, DAILY, Andriy Poon M.D.  •  cloNIDine (CATAPRES) tablet 0.1 mg, 0.1 mg, Enteral Tube, Q4HRS PRN, Andriy Poon M.D., 0.1 mg at 08/07/21 2313  •  gabapentin (NEURONTIN) capsule 100 mg, 100 mg, Enteral Tube, BID, Andriy Poon M.D., 100 mg at 08/10/21 0543  •  levothyroxine (SYNTHROID) tablet 137 mcg, 137 mcg, Enteral Tube, AM ES, Andriy Poon M.D., 137 mcg at 08/10/21 0541  •  fludrocortisone (FLORINEF) tablet 0.5 mg, 0.5 mg, Enteral Tube, DAILY, Andriy Poon M.D., 0.5 mg at 08/10/21 0541  •  diphenhydrAMINE (BENADRYL) tablet/capsule 25 mg, 25 mg, Enteral Tube, Q6HRS PRN **OR** diphenhydrAMINE (BENADRYL) injection 25 mg, 25 mg, Intravenous, Q6HRS PRN, Andriy Poon M.D.  •  magnesium hydroxide (MILK OF MAGNESIA) suspension 30 mL, 30 mL, Enteral Tube, QDAY PRN, Andriy Poon M.D.  •  ALPRAZolam (XANAX) tablet 0.5 mg, 0.5 mg, Enteral Tube, TID PRN, Andriy Poon M.D., 0.5 mg at 08/08/21 2028  •  mycophenolate (CELLCEPT) 200 MG/ML susp 250 mg, 250 mg, Enteral Tube, BID, Andriy Poon M.D., 250 mg at 08/10/21 0541  •  tacrolimus (PROGRAF) 0.5 mg/mL oral suspension 4 mg, 4 mg, Enteral Tube, DAILY, Andriy Poon M.D., 4 mg at 08/09/21 1716  •  docusate sodium (Colace) oral solution 100 mg, 100 mg, Enteral Tube, BID, Andriy Poon M.D., 100 mg at 08/09/21 1007  •  omeprazole (FIRST-OMEPRAZOLE) 2 mg/mL oral susp 40 mg, 40 mg, Enteral Tube, DAILY, Andriy Poon M.D., 40 mg at 08/10/21 0600  •  Pharmacy Consult Request ...Pain Management Review 1 Each, 1 Each, Other, PHARMACY TO DOSE,  Brittny Mosher P.A.-C.  •  MD ALERT...DO NOT ADMINISTER NSAIDS or ASPIRIN unless ORDERED By Neurosurgery 1 Each, 1 Each, Other, PRN, Brittny Mosher P.A.-C.  •  ondansetron (ZOFRAN) syringe/vial injection 4 mg, 4 mg, Intravenous, Q4HRS PRN, Brittny Mosher P.A.-C., 4 mg at 08/06/21 1121  •  diphenhydrAMINE (BENADRYL) injection 25 mg, 25 mg, Intravenous, Q6HRS PRN, RYAN Weiss.-CReal, 25 mg at 08/05/21 1903  •  labetalol (NORMODYNE/TRANDATE) injection 10 mg, 10 mg, Intravenous, Q HOUR PRN, RYAN Weiss.YokoC.  •  hydrALAZINE (APRESOLINE) injection 10 mg, 10 mg, Intravenous, Q HOUR PRN, VALENTINA WeissA.-CLAIRE, 10 mg at 08/07/21 2044  •  bisacodyl (DULCOLAX) suppository 10 mg, 10 mg, Rectal, Q24HRS PRN, VICKEY Weiss-PAKO.  •  fleet enema 133 mL, 1 Each, Rectal, Once PRN, RYAN Weiss.YokoC.  •  benzocaine-menthol (CEPACOL) lozenge 1 Lozenge, 1 Lozenge, Mouth/Throat, Q2HRS PRN, CARLIN WeissCReal  •  [Held by provider] tadalafil (CIALIS) TABS 20 mg, 20 mg, Oral, QHS, Jeremy M Gonda, M.D., 20 mg at 08/07/21 2100    Physical Examination:     Vitals:    08/10/21 0700 08/10/21 0800 08/10/21 0900 08/10/21 1000   BP: (!) 91/53 104/56 102/51 102/56   Pulse: 65 66 65 62   Resp: (!) 30 16 (!) 24 14   Temp:  36.3 °C (97.3 °F)  36.7 °C (98.1 °F)   TempSrc:  Temporal  Temporal   SpO2: 95% 98% 98% 97%   Weight:       Height:           General: Patient is awake and in no acute distress, laying in bed.  Eyes: PERRLA  Ext: Multiple ecchymoses in b/l LE    NEUROLOGICAL EXAM:     Mental status: AAOx2-3 (thinks is May 2002)  Speech and language: Some expressive aphasia present (difficulty finding words). The patient is able to name and repeat.  Cranial nerve exam: Pupils are equal, round and reactive to light bilaterally. Visual fields are full. Confrontation wnl. Extraocular muscles are intact. Sensation in the face is intact to light touch. Face is symmetric. Hearing to finger rub equal. Palate elevates symmetrically.  Shoulder shrug is full. Tongue is midline.  Motor exam: Strength is 5/5 in all extremities both distally and proximally in LUE, subtle R-sided weakness. Tone is normal. No abnormal movements were seen on exam.  Sensory exam: No sensory deficits identified   Deep tendon reflexes:  2+ and symmetric.  Coordination: FTN intact b/l.  Gait: deferred    Objective Data:    Labs:  Lab Results   Component Value Date/Time    PROTHROMBTM 12.7 08/10/2021 09:20 AM    INR 0.98 08/10/2021 09:20 AM      Lab Results   Component Value Date/Time    WBC 5.3 08/09/2021 02:23 AM    RBC 3.02 (L) 08/09/2021 02:23 AM    HEMOGLOBIN 9.4 (L) 08/09/2021 02:23 AM    HEMATOCRIT 28.3 (L) 08/09/2021 02:23 AM    MCV 93.7 08/09/2021 02:23 AM    MCH 31.1 08/09/2021 02:23 AM    MCHC 33.2 (L) 08/09/2021 02:23 AM    MPV 9.2 08/09/2021 02:23 AM    NEUTSPOLYS 50.30 08/03/2021 06:56 AM    LYMPHOCYTES 34.50 08/03/2021 06:56 AM    MONOCYTES 8.60 08/03/2021 06:56 AM    EOSINOPHILS 4.80 08/03/2021 06:56 AM    BASOPHILS 0.90 08/03/2021 06:56 AM    HYPOCHROMIA 1+ 08/05/2014 08:16 AM    ANISOCYTOSIS 1+ 11/21/2018 12:46 AM      Lab Results   Component Value Date/Time    SODIUM 134 (L) 08/09/2021 02:23 AM    POTASSIUM 3.8 08/09/2021 02:23 AM    CHLORIDE 98 08/09/2021 02:23 AM    CO2 28 08/09/2021 02:23 AM    GLUCOSE 155 (H) 08/09/2021 02:23 AM    BUN 5 (L) 08/09/2021 02:23 AM    CREATININE 0.35 (L) 08/09/2021 02:23 AM      Lab Results   Component Value Date/Time    CHOLSTRLTOT 142 05/28/2019 07:35 AM    LDL 67 01/30/2019 08:34 AM    HDL 76 05/28/2019 07:35 AM    TRIGLYCERIDE 67 05/28/2019 07:35 AM       Lab Results   Component Value Date/Time    ALKPHOSPHAT 67 08/04/2021 04:45 AM    ASTSGOT 24 08/04/2021 04:45 AM    ALTSGPT 10 08/04/2021 04:45 AM    TBILIRUBIN 0.3 08/04/2021 04:45 AM        Imaging/Testing:  I interpreted the patient's CTH on 8/8/2021 showing L subdural hematoma with left to right midline shift.    Assessment and Plan:    Pt is a 61 y.o. female with  history of COPD (on 3L oxygen), pulmonary HTN, OLT (2011 for alcoholic cirrhosis and granulomatous hepatitis/sarcoid), stage 3 CKD, GERD, Evon-en-Y gastric bypass (2018), MGUS, ?adrenal insufficiency, and hypothyroidism who presented to the hospital on 8/3/2021 for headache x 4 wks after SDH 2/2 mechanical GLF, along with nausea/vomiting, and vision changes. Neurology was consulted for seizure control and medication management (patient is currently on Dilantin and Keppra).    Patient has not had any seizures for the last 24 hours. We recommend discontinuing Dilantin in favor of Vimpat as the patient has had an OLT and dilantin can cause liver damage. No need for EEG at this time as it would not change medical management, unless if there are acute changes in seizure patterns.    Plan:  -Continue keppra  -Discontinue dilantin  -Start Vimpat 100 mg BID, titrate up as needed  -Continue neurochecks  -Washout per NSG  -PT/OT/SLP  -Seizure/fall/aspiration precautions    Thank you for this consult.    This chart was partially generated using voice recognition technology and sound alike word replacement may be present, best efforts were made to make the chart accurate.    Tami Cheek MD, MPH  [employer] [facility], PGY-2

## 2021-08-11 LAB
ANION GAP SERPL CALC-SCNC: 10 MMOL/L (ref 7–16)
BUN SERPL-MCNC: 5 MG/DL (ref 8–22)
CALCIUM SERPL-MCNC: 7.2 MG/DL (ref 8.5–10.5)
CHLORIDE SERPL-SCNC: 103 MMOL/L (ref 96–112)
CO2 SERPL-SCNC: 25 MMOL/L (ref 20–33)
CREAT SERPL-MCNC: 0.33 MG/DL (ref 0.5–1.4)
ERYTHROCYTE [DISTWIDTH] IN BLOOD BY AUTOMATED COUNT: 56.9 FL (ref 35.9–50)
GLUCOSE SERPL-MCNC: 74 MG/DL (ref 65–99)
HCT VFR BLD AUTO: 28.5 % (ref 37–47)
HGB BLD-MCNC: 9.1 G/DL (ref 12–16)
MAGNESIUM SERPL-MCNC: 1.5 MG/DL (ref 1.5–2.5)
MCH RBC QN AUTO: 31.2 PG (ref 27–33)
MCHC RBC AUTO-ENTMCNC: 31.9 G/DL (ref 33.6–35)
MCV RBC AUTO: 97.6 FL (ref 81.4–97.8)
PHOSPHATE SERPL-MCNC: 3 MG/DL (ref 2.5–4.5)
PLATELET # BLD AUTO: 155 K/UL (ref 164–446)
PMV BLD AUTO: 9.5 FL (ref 9–12.9)
POTASSIUM SERPL-SCNC: 3.1 MMOL/L (ref 3.6–5.5)
RBC # BLD AUTO: 2.92 M/UL (ref 4.2–5.4)
SODIUM SERPL-SCNC: 138 MMOL/L (ref 135–145)
WBC # BLD AUTO: 4.1 K/UL (ref 4.8–10.8)

## 2021-08-11 PROCEDURE — 700111 HCHG RX REV CODE 636 W/ 250 OVERRIDE (IP): Performed by: STUDENT IN AN ORGANIZED HEALTH CARE EDUCATION/TRAINING PROGRAM

## 2021-08-11 PROCEDURE — 97530 THERAPEUTIC ACTIVITIES: CPT

## 2021-08-11 PROCEDURE — 80048 BASIC METABOLIC PNL TOTAL CA: CPT

## 2021-08-11 PROCEDURE — 700102 HCHG RX REV CODE 250 W/ 637 OVERRIDE(OP): Performed by: STUDENT IN AN ORGANIZED HEALTH CARE EDUCATION/TRAINING PROGRAM

## 2021-08-11 PROCEDURE — A9270 NON-COVERED ITEM OR SERVICE: HCPCS | Performed by: STUDENT IN AN ORGANIZED HEALTH CARE EDUCATION/TRAINING PROGRAM

## 2021-08-11 PROCEDURE — 83735 ASSAY OF MAGNESIUM: CPT

## 2021-08-11 PROCEDURE — 770006 HCHG ROOM/CARE - MED/SURG/GYN SEMI*

## 2021-08-11 PROCEDURE — 84100 ASSAY OF PHOSPHORUS: CPT

## 2021-08-11 PROCEDURE — 700105 HCHG RX REV CODE 258: Performed by: PSYCHIATRY & NEUROLOGY

## 2021-08-11 PROCEDURE — 97535 SELF CARE MNGMENT TRAINING: CPT

## 2021-08-11 PROCEDURE — 97116 GAIT TRAINING THERAPY: CPT

## 2021-08-11 PROCEDURE — 92526 ORAL FUNCTION THERAPY: CPT

## 2021-08-11 PROCEDURE — 03VM3DZ RESTRICTION OF RIGHT EXTERNAL CAROTID ARTERY WITH INTRALUMINAL DEVICE, PERCUTANEOUS APPROACH: ICD-10-PCS | Performed by: RADIOLOGY

## 2021-08-11 PROCEDURE — A9270 NON-COVERED ITEM OR SERVICE: HCPCS | Performed by: HOSPITALIST

## 2021-08-11 PROCEDURE — B31C1ZZ FLUOROSCOPY OF BILATERAL EXTERNAL CAROTID ARTERIES USING LOW OSMOLAR CONTRAST: ICD-10-PCS | Performed by: RADIOLOGY

## 2021-08-11 PROCEDURE — 03VN3DZ RESTRICTION OF LEFT EXTERNAL CAROTID ARTERY WITH INTRALUMINAL DEVICE, PERCUTANEOUS APPROACH: ICD-10-PCS | Performed by: RADIOLOGY

## 2021-08-11 PROCEDURE — 700111 HCHG RX REV CODE 636 W/ 250 OVERRIDE (IP): Performed by: PSYCHIATRY & NEUROLOGY

## 2021-08-11 PROCEDURE — 99233 SBSQ HOSP IP/OBS HIGH 50: CPT | Performed by: STUDENT IN AN ORGANIZED HEALTH CARE EDUCATION/TRAINING PROGRAM

## 2021-08-11 PROCEDURE — 85027 COMPLETE CBC AUTOMATED: CPT

## 2021-08-11 PROCEDURE — C9254 INJECTION, LACOSAMIDE: HCPCS | Performed by: PSYCHIATRY & NEUROLOGY

## 2021-08-11 PROCEDURE — 700105 HCHG RX REV CODE 258: Performed by: STUDENT IN AN ORGANIZED HEALTH CARE EDUCATION/TRAINING PROGRAM

## 2021-08-11 PROCEDURE — 700102 HCHG RX REV CODE 250 W/ 637 OVERRIDE(OP): Performed by: HOSPITALIST

## 2021-08-11 RX ORDER — LEVETIRACETAM 500 MG/1
1500 TABLET ORAL 2 TIMES DAILY
Status: DISCONTINUED | OUTPATIENT
Start: 2021-08-11 | End: 2021-08-11

## 2021-08-11 RX ORDER — POTASSIUM CHLORIDE 20 MEQ/1
40 TABLET, EXTENDED RELEASE ORAL ONCE
Status: COMPLETED | OUTPATIENT
Start: 2021-08-11 | End: 2021-08-11

## 2021-08-11 RX ORDER — SODIUM CHLORIDE 9 MG/ML
1000 INJECTION, SOLUTION INTRAVENOUS ONCE
Status: COMPLETED | OUTPATIENT
Start: 2021-08-11 | End: 2021-08-11

## 2021-08-11 RX ORDER — MAGNESIUM SULFATE 1 G/100ML
1 INJECTION INTRAVENOUS ONCE
Status: COMPLETED | OUTPATIENT
Start: 2021-08-11 | End: 2021-08-11

## 2021-08-11 RX ORDER — LEVETIRACETAM 500 MG/1
1500 TABLET ORAL 2 TIMES DAILY
Status: DISCONTINUED | OUTPATIENT
Start: 2021-08-11 | End: 2021-08-14

## 2021-08-11 RX ADMIN — SILDENAFIL 20 MG: 20 TABLET ORAL at 17:41

## 2021-08-11 RX ADMIN — SERTRALINE HYDROCHLORIDE 100 MG: 100 TABLET ORAL at 05:10

## 2021-08-11 RX ADMIN — FLUDROCORTISONE ACETATE 0.5 MG: 0.1 TABLET ORAL at 05:02

## 2021-08-11 RX ADMIN — OXYCODONE HYDROCHLORIDE 10 MG: 10 TABLET ORAL at 13:50

## 2021-08-11 RX ADMIN — SODIUM CHLORIDE 100 MG: 9 INJECTION, SOLUTION INTRAVENOUS at 20:52

## 2021-08-11 RX ADMIN — OXYCODONE 5 MG: 5 TABLET ORAL at 20:56

## 2021-08-11 RX ADMIN — ACETAMINOPHEN 650 MG: 325 TABLET, FILM COATED ORAL at 14:49

## 2021-08-11 RX ADMIN — OXYCODONE HYDROCHLORIDE 10 MG: 10 TABLET ORAL at 17:42

## 2021-08-11 RX ADMIN — POTASSIUM CHLORIDE 40 MEQ: 20 TABLET, EXTENDED RELEASE ORAL at 09:24

## 2021-08-11 RX ADMIN — FUROSEMIDE 20 MG: 40 TABLET ORAL at 05:03

## 2021-08-11 RX ADMIN — GABAPENTIN 100 MG: 100 CAPSULE ORAL at 05:02

## 2021-08-11 RX ADMIN — TACROLIMUS 4 MG: 5 CAPSULE ORAL at 18:34

## 2021-08-11 RX ADMIN — PRAVASTATIN SODIUM 20 MG: 20 TABLET ORAL at 05:03

## 2021-08-11 RX ADMIN — OXYCODONE 5 MG: 5 TABLET ORAL at 23:58

## 2021-08-11 RX ADMIN — BUTALBITAL, ACETAMINOPHEN, AND CAFFEINE 1 TABLET: 50; 325; 40 TABLET ORAL at 09:24

## 2021-08-11 RX ADMIN — OMEPRAZOLE 40 MG: KIT at 05:10

## 2021-08-11 RX ADMIN — MYCOPHENOLATE MOFETIL 250 MG: 200 POWDER, FOR SUSPENSION ORAL at 05:05

## 2021-08-11 RX ADMIN — ACETAMINOPHEN 650 MG: 325 TABLET, FILM COATED ORAL at 08:06

## 2021-08-11 RX ADMIN — MAGNESIUM SULFATE 1 G: 1 INJECTION INTRAVENOUS at 07:14

## 2021-08-11 RX ADMIN — DOCUSATE SODIUM 100 MG: 50 LIQUID ORAL at 17:41

## 2021-08-11 RX ADMIN — MYCOPHENOLATE MOFETIL 250 MG: 200 POWDER, FOR SUSPENSION ORAL at 17:43

## 2021-08-11 RX ADMIN — LEVETIRACETAM INJECTION 1500 MG: 15 INJECTION INTRAVENOUS at 05:04

## 2021-08-11 RX ADMIN — BUTALBITAL, ACETAMINOPHEN, AND CAFFEINE 1 TABLET: 50; 325; 40 TABLET ORAL at 20:41

## 2021-08-11 RX ADMIN — SILDENAFIL 20 MG: 20 TABLET ORAL at 05:05

## 2021-08-11 RX ADMIN — OXYCODONE 5 MG: 5 TABLET ORAL at 10:37

## 2021-08-11 RX ADMIN — SILDENAFIL 20 MG: 20 TABLET ORAL at 12:26

## 2021-08-11 RX ADMIN — LEVOTHYROXINE SODIUM 137 MCG: 0.03 TABLET ORAL at 05:03

## 2021-08-11 RX ADMIN — SODIUM CHLORIDE 1000 ML: 9 INJECTION, SOLUTION INTRAVENOUS at 20:18

## 2021-08-11 RX ADMIN — Medication 5 MG: at 22:29

## 2021-08-11 RX ADMIN — GABAPENTIN 100 MG: 100 CAPSULE ORAL at 17:41

## 2021-08-11 RX ADMIN — SODIUM CHLORIDE 100 MG: 9 INJECTION, SOLUTION INTRAVENOUS at 05:23

## 2021-08-11 RX ADMIN — LEVETIRACETAM 1500 MG: 500 TABLET ORAL at 17:42

## 2021-08-11 RX ADMIN — OXYCODONE HYDROCHLORIDE 10 MG: 10 TABLET ORAL at 03:48

## 2021-08-11 RX ADMIN — Medication 100 MG: at 05:06

## 2021-08-11 ASSESSMENT — COGNITIVE AND FUNCTIONAL STATUS - GENERAL
DAILY ACTIVITIY SCORE: 14
MOVING TO AND FROM BED TO CHAIR: A LITTLE
SUGGESTED CMS G CODE MODIFIER MOBILITY: CK
TURNING FROM BACK TO SIDE WHILE IN FLAT BAD: A LITTLE
TOILETING: A LITTLE
HELP NEEDED FOR BATHING: A LOT
WALKING IN HOSPITAL ROOM: A LITTLE
EATING MEALS: TOTAL
DRESSING REGULAR LOWER BODY CLOTHING: A LOT
CLIMB 3 TO 5 STEPS WITH RAILING: A LITTLE
SUGGESTED CMS G CODE MODIFIER DAILY ACTIVITY: CK
PERSONAL GROOMING: A LITTLE
MOVING FROM LYING ON BACK TO SITTING ON SIDE OF FLAT BED: A LITTLE
STANDING UP FROM CHAIR USING ARMS: A LITTLE
DRESSING REGULAR UPPER BODY CLOTHING: A LITTLE
MOBILITY SCORE: 18

## 2021-08-11 ASSESSMENT — ENCOUNTER SYMPTOMS
WEAKNESS: 0
MYALGIAS: 1
BACK PAIN: 0
SPEECH CHANGE: 0
NECK PAIN: 0
BLURRED VISION: 0
FEVER: 0
SHORTNESS OF BREATH: 0
HEMOPTYSIS: 0
PALPITATIONS: 0
DOUBLE VISION: 0
FOCAL WEAKNESS: 0
MYALGIAS: 0
DIZZINESS: 0
HEADACHES: 1
VOMITING: 0
COUGH: 0
CHILLS: 0
SEIZURES: 1
ABDOMINAL PAIN: 0
BRUISES/BLEEDS EASILY: 0

## 2021-08-11 ASSESSMENT — GAIT ASSESSMENTS
DEVIATION: BRADYKINETIC
GAIT LEVEL OF ASSIST: SUPERVISED
DISTANCE (FEET): 500
ASSISTIVE DEVICE: FRONT WHEEL WALKER

## 2021-08-11 ASSESSMENT — FIBROSIS 4 INDEX: FIB4 SCORE: 2.99

## 2021-08-11 NOTE — PROGRESS NOTES
"Neurology Progress Note      Referring Physician: Jose Oropeza M.D.    Chief Complaint   Patient presents with   • Shortness of Breath     x 2 days. Patient states she has used 3L NC at home for the past 10 years. Patient states she has a \"pulmonary syndrome.\" Patient is 97% on RA. Patient reports her saturation decreased when laying down.    • Skin Lesion     Patient reports sarcoids x 10 years    • Headache     x 3 weeks       HPI: Refer to initial documented Neurology H&P, as detailed in the patient's chart.    Interval History 08/11/21:   NAEON. This morning, the patient reports persistent headache that is unchanged from previous day.  She also had some tingling in her right toes.  No other complaints.    Review of systems: In addition to what is detailed in the HPI and/or updated in the interval history, all other systems reviewed and are negative.    Past Medical History:    has a past medical history of Anemia, Anesthesia, Breath shortness, Bronchitis ( ), Cardiomegaly, Chronic fatigue and malaise, Cirrhosis ([facility]) (December 2011), CKD (chronic kidney disease) stage 3, GFR 30-59 ml/min ([facility]), Diabetes (HCC), Fracture of left foot, GERD (gastroesophageal reflux disease), H/O Clostridium difficile infection (02-17-17), Hemorrhagic disorder ([facility]), Hiatus hernia syndrome, High cholesterol, Hypothyroid, Jaundice (2009), Liver transplanted ([facility]), Low back pain (02-17-17), Mild aortic regurgitation and aortic valve sclerosis ( ), On home oxygen therapy, Platelet disorder ([facility]), Pneumonia, Psychiatric disorder, Pulmonary hypertension ([facility]), S/P cholecystectomy, Small bowel obstruction ([facility]), Splenomegaly, and VRE (vancomycin-resistant Enterococci).    FHx:  family history includes Alcohol/Drug in her maternal grandfather, maternal grandmother, and mother; Cancer in her paternal aunt; Diabetes in her father; Heart Disease in her father; Hyperlipidemia in her father and mother; Hypertension in her father; " Non-contributory in her mother; Other in her father; Stroke in her father.    SHx:   reports that she has never smoked. She has never used smokeless tobacco. She reports that she does not drink alcohol and does not use drugs.    Medications:    Current Facility-Administered Medications:   •  lacosamide (VIMPAT) 100 mg in  mL ivpb, 100 mg, Intravenous, Q12HRS, Anderson Hemphill M.D., Stopped at 08/11/21 0623  •  furosemide (LASIX) tablet 20 mg, 20 mg, Enteral Tube, DAILY, Jose Oropeza M.D., 20 mg at 08/11/21 0503  •  levETIRAcetam (Keppra) 1500 mg in 100 mL NaCl IV premix, 1,500 mg, Intravenous, Q12HRS, Ketan Jeffery M.D., Stopped at 08/11/21 0519  •  artificial tears (EYE LUBRICANT) ophth ointment 1 Application, 1 Application, Both Eyes, PRN, Sebas Odell M.D.  •  thiamine (Vitamin B-1) tablet 100 mg, 100 mg, Enteral Tube, DAILY, Sebas Odell M.D., 100 mg at 08/11/21 0506  •  sildenafil (REVATIO) tablet 20 mg, 20 mg, Enteral Tube, TID, Sebas Odell M.D., 20 mg at 08/11/21 0505  •  LORazepam (ATIVAN) injection 2 mg, 2 mg, Intravenous, Q10 MIN PRN, Ketan Jeffery M.D., 2 mg at 08/09/21 0120  •  Pharmacy Consult: Enteral tube insertion - review meds/change route/product selection, 1 Each, Other, PHARMACY TO DOSE, Andriy Poon M.D.  •  butalbital/apap/caffeine -40 mg (Fioricet) per tablet 1 tablet, 1 Tablet, Enteral Tube, Q6HRS PRN, Andriy Poon M.D., 1 Tablet at 08/10/21 1228  •  oxyCODONE immediate-release (ROXICODONE) tablet 5 mg, 5 mg, Enteral Tube, Q3HRS PRN, 5 mg at 08/10/21 1500 **OR** oxyCODONE immediate release (ROXICODONE) tablet 10 mg, 10 mg, Enteral Tube, Q3HRS PRN, 10 mg at 08/11/21 0348 **OR** [DISCONTINUED] HYDROmorphone (Dilaudid) injection 0.5 mg, 0.5 mg, Intravenous, Q3HRS PRN, Andriy Poon M.D., 0.5 mg at 08/08/21 0258  •  melatonin tablet 5 mg, 5 mg, Enteral Tube, Nightly, Andriy Poon M.D., 5 mg at 08/10/21 7600  •  sertraline (Zoloft) tablet 100 mg, 100 mg, Enteral  Tube, DAILY, Andriy Poon M.D., 100 mg at 08/11/21 0510  •  senna-docusate (PERICOLACE or SENOKOT S) 8.6-50 MG per tablet 1 tablet, 1 Tablet, Enteral Tube, Nightly, Andriy Poon M.D., 1 Tablet at 08/07/21 2130  •  senna-docusate (PERICOLACE or SENOKOT S) 8.6-50 MG per tablet 1 tablet, 1 Tablet, Enteral Tube, Q24HRS PRN, Andriy Poon M.D.  •  polyethylene glycol/lytes (MIRALAX) PACKET 1 Packet, 1 Packet, Enteral Tube, DAILY, Andriy Poon M.D.  •  acetaminophen (Tylenol) tablet 650 mg, 650 mg, Enteral Tube, Q6HRS PRN, Andriy Poon M.D., 650 mg at 08/11/21 0806  •  pravastatin (PRAVACHOL) tablet 20 mg, 20 mg, Enteral Tube, DAILY, Andriy Poon M.D., 20 mg at 08/11/21 0503  •  [Held by provider] ambrisentan (LETAIRIS) TABS 10 mg, 10 mg, Oral, DAILY, Andriy Poon M.D.  •  cloNIDine (CATAPRES) tablet 0.1 mg, 0.1 mg, Enteral Tube, Q4HRS PRN, Andriy Poon M.D., 0.1 mg at 08/07/21 2313  •  gabapentin (NEURONTIN) capsule 100 mg, 100 mg, Enteral Tube, BID, Andriy Poon M.D., 100 mg at 08/11/21 0502  •  levothyroxine (SYNTHROID) tablet 137 mcg, 137 mcg, Enteral Tube, AM ES, Andriy Poon M.D., 137 mcg at 08/11/21 0503  •  fludrocortisone (FLORINEF) tablet 0.5 mg, 0.5 mg, Enteral Tube, DAILY, Andriy Poon M.D., 0.5 mg at 08/11/21 0502  •  diphenhydrAMINE (BENADRYL) tablet/capsule 25 mg, 25 mg, Enteral Tube, Q6HRS PRN **OR** diphenhydrAMINE (BENADRYL) injection 25 mg, 25 mg, Intravenous, Q6HRS PRN, Andriy Poon M.D.  •  magnesium hydroxide (MILK OF MAGNESIA) suspension 30 mL, 30 mL, Enteral Tube, QDAY PRN, Andriy Poon M.D.  •  ALPRAZolam (XANAX) tablet 0.5 mg, 0.5 mg, Enteral Tube, TID PRN, Andriy Poon M.D., 0.5 mg at 08/08/21 2028  •  mycophenolate (CELLCEPT) 200 MG/ML susp 250 mg, 250 mg, Enteral Tube, BID, Andriy Poon M.D., 250 mg at 08/11/21 0505  •  tacrolimus (PROGRAF) 0.5 mg/mL oral suspension 4 mg, 4 mg, Enteral Tube, DAILY, Andriy Poon  M.D., 4 mg at 08/10/21 1707  •  docusate sodium (Colace) oral solution 100 mg, 100 mg, Enteral Tube, BID, Andriy Poon M.D., 100 mg at 08/09/21 1007  •  omeprazole (FIRST-OMEPRAZOLE) 2 mg/mL oral susp 40 mg, 40 mg, Enteral Tube, DAILY, Andriy Poon M.D., 40 mg at 08/11/21 0510  •  Pharmacy Consult Request ...Pain Management Review 1 Each, 1 Each, Other, PHARMACY TO DOSE, MICHELE Weiss.A.-C.  •  MD ALERT...DO NOT ADMINISTER NSAIDS or ASPIRIN unless ORDERED By Neurosurgery 1 Each, 1 Each, Other, PRN, MICHELE Weiss.A.-C.  •  ondansetron (ZOFRAN) syringe/vial injection 4 mg, 4 mg, Intravenous, Q4HRS PRN, MICHELE Weiss.A.-C., 4 mg at 08/06/21 1121  •  diphenhydrAMINE (BENADRYL) injection 25 mg, 25 mg, Intravenous, Q6HRS PRN, Brittny Mosher P.A.-C., 25 mg at 08/05/21 1903  •  labetalol (NORMODYNE/TRANDATE) injection 10 mg, 10 mg, Intravenous, Q HOUR PRN, Brittny Mosher P.A.-C.  •  hydrALAZINE (APRESOLINE) injection 10 mg, 10 mg, Intravenous, Q HOUR PRN, Brittny Mosher P.A.-C., 10 mg at 08/07/21 2044  •  bisacodyl (DULCOLAX) suppository 10 mg, 10 mg, Rectal, Q24HRS PRN, Brittny Mosher P.A.-C.  •  fleet enema 133 mL, 1 Each, Rectal, Once PRN, Brittny Mosher P.A.-C.  •  benzocaine-menthol (CEPACOL) lozenge 1 Lozenge, 1 Lozenge, Mouth/Throat, Q2HRS PRN, MICHELE Weiss.A.-C.  •  [Held by provider] tadalafil (CIALIS) TABS 20 mg, 20 mg, Oral, QHS, Jeremy M Gonda, M.D., 20 mg at 08/07/21 2100    Physical Examination:     Vitals:    08/11/21 0700 08/11/21 0800 08/11/21 0900 08/11/21 1000   BP: 129/62 113/59 120/64    Pulse: 63 62 72    Resp: (!) 11 (!) 26 13    Temp:  36.8 °C (98.2 °F)  36.7 °C (98 °F)   TempSrc:  Temporal  Temporal   SpO2: 95% 95% 97%    Weight:       Height:           General: Patient is awake and in no acute distress, sitting up in chair.  Previously was walking around ICU unit slowly without assistance.  Eyes: examination of optic disks not indicated at this time      NEUROLOGICAL EXAM:      Mental status: AAO x2-3, follows commands  Speech and language: Occasional expressive aphasia.   The patient is able to name and repeat.  Cranial nerve exam: Pupils are equal, round and reactive to light bilaterally. Visual fields are full. Confrontation wnl. Extraocular muscles are intact. Sensation in the face is intact to light touch. Face is symmetric. Hearing to finger rub equal. Palate elevates symmetrically. Shoulder shrug is full. Tongue is midline.  Motor exam: Strength is 5/5 in all extremities both distally and proximally. Tone is normal. No abnormal movements were seen on exam.  Sensory exam: No sensory deficits identified   Coordination: no ataxia appreciated  Gait: Normal    Objective Data:    Labs:  Lab Results   Component Value Date/Time    PROTHROMBTM 12.7 08/10/2021 09:20 AM    INR 0.98 08/10/2021 09:20 AM      Lab Results   Component Value Date/Time    WBC 4.1 (L) 08/11/2021 04:00 AM    RBC 2.92 (L) 08/11/2021 04:00 AM    HEMOGLOBIN 9.1 (L) 08/11/2021 04:00 AM    HEMATOCRIT 28.5 (L) 08/11/2021 04:00 AM    MCV 97.6 08/11/2021 04:00 AM    MCH 31.2 08/11/2021 04:00 AM    MCHC 31.9 (L) 08/11/2021 04:00 AM    MPV 9.5 08/11/2021 04:00 AM    NEUTSPOLYS 50.30 08/03/2021 06:56 AM    LYMPHOCYTES 34.50 08/03/2021 06:56 AM    MONOCYTES 8.60 08/03/2021 06:56 AM    EOSINOPHILS 4.80 08/03/2021 06:56 AM    BASOPHILS 0.90 08/03/2021 06:56 AM    HYPOCHROMIA 1+ 08/05/2014 08:16 AM    ANISOCYTOSIS 1+ 11/21/2018 12:46 AM      Lab Results   Component Value Date/Time    SODIUM 138 08/11/2021 04:00 AM    POTASSIUM 3.1 (L) 08/11/2021 04:00 AM    CHLORIDE 103 08/11/2021 04:00 AM    CO2 25 08/11/2021 04:00 AM    GLUCOSE 74 08/11/2021 04:00 AM    BUN 5 (L) 08/11/2021 04:00 AM    CREATININE 0.33 (L) 08/11/2021 04:00 AM      Lab Results   Component Value Date/Time    CHOLSTRLTOT 142 05/28/2019 07:35 AM    LDL 67 01/30/2019 08:34 AM    HDL 76 05/28/2019 07:35 AM    TRIGLYCERIDE 67 05/28/2019 07:35 AM       Lab Results    Component Value Date/Time    ALKPHOSPHAT 67 08/04/2021 04:45 AM    ASTSGOT 24 08/04/2021 04:45 AM    ALTSGPT 10 08/04/2021 04:45 AM    TBILIRUBIN 0.3 08/04/2021 04:45 AM        Imaging/Testing:    I interpreted the patient's CTH on 8/8/2021 showing L subdural hematoma with left to right midline shift.       Assessment and Plan:     Roxana Cuba is a 61 y.o. female with history of COPD (on 3L oxygen), pulmonary HTN, OLT (2011 for alcoholic cirrhosis and granulomatous hepatitis/sarcoid), stage 3 CKD, MGUS, ?adrenal insufficiency, and hypothyroidism who presented to the hospital on 8/3/2021 for headache x 4 wks after SDH 2/2 mechanical GLF, along with nausea/vomiting, and vision changes. Neurology was consulted for seizure control and medication management.  Patient is currently on lacosamide and levetiracetam.  No seizures for the last 24 hours per EEG; last seizure was at 8:45 AM on 8/10/2021.    Plan:  -Continue lacosamide 100 twice daily and levetiracetam 1500 mg twice daily.  Levetiracetam will be changed to p.o. today  -No washout per neurosurgery  -Continue neurochecks  -PT/OT/SLP  -Seizure/fall/aspiration precautions    From neurology standpoint, okay to transfer to floor.    Thank you for this consult.  Neurology will sign off.    This chart was partially generated using voice recognition technology and sound alike word replacement may be present, best efforts were made to make the chart accurate.    Tami Cheek MD, MPH  [facility], PGY-2

## 2021-08-11 NOTE — THERAPY
Physical Therapy   Daily Treatment     Patient Name: Roxana Cuba  Age:  61 y.o., Sex:  female  Medical Record #: [id number]  Today's Date: 8/11/2021     Precautions: Fall Risk, Swallow Precautions, Nasogastric Tube    Assessment  Pt has made great progress with PT. Pt demonstrated all mobility without assist, ambulated with FWW, no LOB, 500ft. Pt reports no concerns with return home, states her  is able to assist as needed and is home. Pt appears functionally capable of return home at this time. FWW was already delivered. Recommend HHPT to facilitate return home and return to OF. Encourage continued ambulation with nursing to prevent deconditioning. Patient will no longer be actively followed for physical therapy services at this time, however may be seen if requested by physician for 1 more visit within 30 days to address any discharge or equipment needs.     Plan  Discharge secondary to goals met.  DC Equipment Recommendations: FWW was already delivered  Discharge Recommendations: Recommend home health for continued physical therapy services       08/11/21 0904   Cognition    Speech/ Communication Expressive Aphasia (mild)   Level of Consciousness Alert   Attention Impaired   Comments pleasant and cooperative. requires cues for continuation of task, awareness of surroundings   Balance   Sitting Balance (Static) Good   Sitting Balance (Dynamic) Fair +   Standing Balance (Static) Fair +   Standing Balance (Dynamic) Fair   Weight Shift Sitting Good   Weight Shift Standing Fair   Comments standing with FWW   Gait Analysis   Gait Level Of Assist Supervised   Assistive Device Front Wheel Walker   Distance (Feet) 500   Deviation Bradykinetic   Weight Bearing Status no restrictions   Comments no LOB. occasional veering to R and running into objects but able to correct on her own and does well with cues to avoid surroundings   Bed Mobility    Supine to Sit Supervised   Scooting Supervised   Comments improved  initiation   Functional Mobility   Sit to Stand Supervised   Bed, Chair, WC Transfer Supervised   Comments cues for hand placement   Short Term Goals    Short Term Goal # 1 pt will be able to perform sit<>stand/transfers with FWW wtih spv in 6tx to promtoe dc to home    Goal Outcome # 1 Goal met   Short Term Goal # 2 pt will be able to ambulate 150ft with FWW with Spv in 6tx to promote dc to home plan   Goal Outcome # 2 Progressing as expected   Short Term Goal # 3 pt will perform supine <> sit without bed features with SPV in 6 visits to be able to get in/out of bed at home   Goal Outcome # 3 Goal met

## 2021-08-11 NOTE — RESPIRATORY CARE
COPD EDUCATION by COPD CLINICAL EDUCATOR  8/11/2021 at 6:48 AM by Maci Velásquez, RRT     Patient reviewed by COPD education team. Patient does not have a history or diagnosis of COPD, she has ILD and she has never smoked.  Therefore, patient does not qualify for the COPD program.      COPD Assessment  COPD Clinical Specialists ONLY  COPD Education Initiated: No--Quick Screen (Per [facility] patient has ILD. She has never smoked.)  Is this a COPD exacerbation patient?: No

## 2021-08-11 NOTE — CARE PLAN
The patient is Watcher - Medium risk of patient condition declining or worsening    Shift Goals  Clinical Goals: stable neuro exams   Patient Goals: pain control   Family Goals: Comfort    Progress made toward(s) clinical / shift goals: pain controlled to patient's desired comfort goal. Neuro exams remained stable.     Patient is not progressing towards the following goals:

## 2021-08-11 NOTE — THERAPY
Occupational Therapy  Daily Treatment     Patient Name: Roxana Cuba  Age:  61 y.o., Sex:  female  Medical Record #: [id number]  Today's Date: 8/11/2021     Precautions  Precautions: Fall Risk, Swallow Precautions ( See Comments), Nasogastric Tube  Comments: L crani    Assessment    Pt seen for OT tx following t/f back to ICU for seizure activity along with increase in SDH, MMA embolization planned for tomorrow (8/12). Pt was able to perform basic self care tasks with cga/min a, ambulated hallway with cga with cues for R sided inattention in dynamic environment, educated on scanning skills while engaging in functional activities and ADLs; stated understanding.  Pt will continue to benefit from acute OT services while in house.     Plan    Continue current treatment plan.    DC Equipment Recommendations: Front-Wheel Walker, Tub / Shower Seat  Discharge Recommendations: Recommend home health for continued occupational therapy services     Objective       08/11/21 0844   Cognition    Cognition / Consciousness X   Speech/ Communication Expressive Aphasia   Level of Consciousness Alert   Safety Awareness Impulsive   Attention Impaired   Comments Pleasant, has R sided inattention in dynamic environment    Other Treatments   Other Treatments Provided Pt was educated on her mild R inattenttion deficits and discussed scanning her environment. Declines double vision and grossly tracking in all visual fields    Balance   Sitting Balance (Static) Fair +   Sitting Balance (Dynamic) Fair +   Standing Balance (Static) Fair   Standing Balance (Dynamic) Fair   Weight Shift Sitting Good   Weight Shift Standing Fair   Skilled Intervention Verbal Cuing;Compensatory Strategies   Comments w/FWW, cues for scanning to R side   Bed Mobility    Supine to Sit Supervised   Sit to Supine   (UIC post session)   Scooting Supervised   Rolling Minimal Assist to Rt.   Skilled Intervention Verbal Cuing;Tactile Cuing;Sequencing   Comments slightly  raised hob   Activities of Daily Living   Eating Total Assist  (NG tube in place)   Grooming Supervision;Seated  (for washcloth)   Bathing   (NT)   Upper Body Dressing Minimal Assist   Lower Body Dressing Minimal Assist   Toileting   (declined)   Skilled Intervention Verbal Cuing;Tactile Cuing;Sequencing;Compensatory Strategies   Functional Mobility   Sit to Stand Supervised   Bed, Chair, Wheelchair Transfer Supervised   Toilet Transfers Refused  (declined need to use BR)   Transfer Method Stand Step   Skilled Intervention Verbal Cuing;Tactile Cuing;Compensatory Strategies   Comments w/FWW   Short Term Goals   Short Term Goal # 1 Pt will complete toilet transfer with spv by discharge.   Goal Outcome # 1 Progressing as expected   Short Term Goal # 2 Pt will complete toileting with spv by discharge.   Goal Outcome # 2 Progressing as expected   Short Term Goal # 3 Pt will complete standing grooming/hygiene with spv by discharge.   Goal Outcome # 3 Progressing as expected   Anticipated Discharge Equipment and Recommendations   DC Equipment Recommendations Front-Wheel Walker;Tub / Shower Seat

## 2021-08-11 NOTE — THERAPY
Missed Therapy     Patient Name: Roxana Cuba  Age:  61 y.o., Sex:  female  Medical Record #: [id number]  Today's Date: 8/11/2021    Discussed missed therapy with RN       08/11/21 0834   Treatment Variance   Reason For Missed Therapy Medical - Other (Please Comment)   Total Time Spent   Total Time Spent (Mins) 2   Interdisciplinary Plan of Care Collaboration   IDT Collaboration with  Nursing   Collaboration Comments Per RN, pt currently NPO per MD. SLP to follow as pt is appropriate to participate.

## 2021-08-11 NOTE — CONSULTS
"Radiology Consult  Author: BEHZAD Renee Date & Time created: 8/11/2021  8:28 AM   Date of admission  8/3/2021  Note to reader: this note follows the APSO format rather than the historical SOAP format. Assessment and plan located at the top of the note for ease of use.    Chief Complaint  61 y.o. female admitted 8/3/2021 with   Chief Complaint   Patient presents with   • Shortness of Breath     x 2 days. Patient states she has used 3L NC at home for the past 10 years. Patient states she has a \"pulmonary syndrome.\" Patient is 97% on RA. Patient reports her saturation decreased when laying down.    • Skin Lesion     Patient reports sarcoids x 10 years    • Headache     x 3 weeks       HPI  Roxana Cuba is a 61 y.o. female with complex PMHx significant for COPD, chronic hypoxemic respiratory failure on 3L NC, pulmonary HTN, liver transplantation in 2011 for etoh cirrhosis, granulomatous hepatitis/sarcoid, Evon-en-Y gastric bypass 2018, MGUS, CKD, ?adrenal insufficiency, hypothyroidism who presented 8/3/2021 with worsening HA and visual changes x4 weeks after being diagnosed with a SDH secondary to ground level, mechanical fall 5 weeks ago while she was in [facility]. Patient also had laparotomy for \"twisting of the bowel\" in [facility] 5 weeks ago. No prior imaging available, but MRI done here reveals 29 mm subacute/chronic L SDH and SAH adjacent to L frontal/temporal lobes with 6 mm L->R shift. NSG was consulted and brought the patient to the OR and performed left craniotomy for a large subdural hematoma with membrane formation. Subgaleal drain was placed.    She was preparing to discharge home with home health and oxygen when she developed intermittent confusion and slurred speech; so repeat head CT showed increase in SDH to 14 mm depth and 3.3 mm left to right midline shift.  Patient was transferred back to the ICU per neurosurgery.      Assessment/Plan  Interval History   Principal Problem:    Traumatic " subdural hematoma without loss of consciousness (HCC)  Active Problems:    Status post liver transplant Dr. Canada ([facility])    Acquired hypothyroidism    Chronic respiratory failure with hypoxia (HCC)    Pulmonary hypertension (HCC)    GERD (gastroesophageal reflux disease)    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HCC)    Eye swelling, left    Dysphagia following nontraumatic intracerebral hemorrhage    Seizures (HCC)      Plan IR  - Left MMA embolization scheduled for 8/12/21 with GA  - NPO at midnight  - Hold any blood thinners/ ASA  - Went over the risks, benefits, and alternatives of aforementioned procedures were discussed with patient in detail before proceeding.  Patient was given opportunities to ask questions and discuss other options.  Risks including but not limited to perforation, infection, bleeding, missed lesion(s), possible need for surgery(ies) and/or interventional radiology, possible need for repeat procedure(s) and/or additional testing, hospitalization possibly prolonged, cardiac and/or pulmonary event, aspiration, hypoxia, stroke, blindness, medication (s) and/or anesthesia reaction(s), discomfort/pain, unsuccessful and/or incomplete procedure, ineffective therapy, persistent symptoms, damage to adjacent organs/structure and/or vascular, and other adverse event(s) possibly life-threatening.  Interactive discussion was undertaken with Layman's terms.  I answered questions in full and to satisfaction.  Patient stated understanding and acceptance of these risks, and wished to proceed.         IR:            Review of Systems  Physical Exam   Review of Systems   Constitutional: Positive for malaise/fatigue. Negative for chills and fever.   HENT: Negative for hearing loss.    Eyes: Negative for blurred vision.   Respiratory: Negative for cough, hemoptysis and shortness of breath.    Cardiovascular: Negative for chest pain and palpitations.   Gastrointestinal: Negative for abdominal pain and vomiting.    Genitourinary: Negative for dysuria.   Musculoskeletal: Negative for myalgias.   Skin: Negative for rash.   Neurological: Positive for seizures and headaches. Negative for dizziness, speech change, focal weakness and weakness.   Endo/Heme/Allergies: Does not bruise/bleed easily.   Psychiatric/Behavioral: Negative for suicidal ideas.      Vitals:    08/11/21 0800   BP: 113/59   Pulse: 62   Resp: (!) 26   Temp: 36.8 °C (98.2 °F)   SpO2: 95%      Physical Exam  Constitutional:       Appearance: Normal appearance.   HENT:      Head: Normocephalic.      Comments: Staples      Nose: Nose normal.      Comments: NG tube     Mouth/Throat:      Mouth: Mucous membranes are dry.   Eyes:      Pupils: Pupils are equal, round, and reactive to light.   Cardiovascular:      Rate and Rhythm: Normal rate.   Pulmonary:      Effort: Pulmonary effort is normal. No respiratory distress.   Abdominal:      General: Abdomen is flat.      Tenderness: There is no abdominal tenderness.   Genitourinary:     Comments: Wick   Musculoskeletal:         General: No tenderness or deformity.      Cervical back: Normal range of motion.      Right lower leg: Edema present.      Left lower leg: Edema present.      Comments: Trace edema    Skin:     General: Skin is warm and dry.      Capillary Refill: Capillary refill takes less than 2 seconds.      Coloration: Skin is not jaundiced or pale.   Neurological:      Mental Status: She is alert and oriented to person, place, and time.      Cranial Nerves: No cranial nerve deficit, dysarthria or facial asymmetry.      Sensory: No sensory deficit.      Motor: No weakness or tremor.      Coordination: Coordination normal. Finger-Nose-Finger Test normal.      Comments: Minor difficulty finding words   Psychiatric:         Mood and Affect: Mood normal.         Behavior: Behavior normal.             Labs    Recent Labs     08/09/21  0223 08/11/21  0400   WBC 5.3 4.1*   RBC 3.02* 2.92*   HEMOGLOBIN 9.4* 9.1*    HEMATOCRIT 28.3* 28.5*   MCV 93.7 97.6   MCH 31.1 31.2   MCHC 33.2* 31.9*   RDW 55.0* 56.9*   PLATELETCT 143* 155*   MPV 9.2 9.5     Recent Labs     08/09/21  0223 08/11/21  0400   SODIUM 134* 138   POTASSIUM 3.8 3.1*   CHLORIDE 98 103   CO2 28 25   GLUCOSE 155* 74   BUN 5* 5*   CREATININE 0.35* 0.33*   CALCIUM 7.8* 7.2*     IR-MIDLINE CATHETER INSERTION WO GUIDANCE > AGE 3   Final Result                  Ultrasound-guided midline placement performed by qualified nursing staff    as above.          DX-CHEST-PORTABLE (1 VIEW)   Final Result         1.  Interstitial pulmonary parenchymal prominence suggest chronic underlying lung disease, component of interstitial edema and/or infiltrates not excluded.   2.  Small left pleural effusion   3.  Cardiomegaly   4.  Atherosclerosis      CT-HEAD W/O   Final Result         1.  Left subdural hematoma, stable since prior study.   2.  3.3 mm left right midline shift, similar compared to prior study.   3.  Minimal pneumocephalus, stable to slightly decreased since prior study.   4.  Nonspecific white matter changes, commonly associated with small vessel ischemic disease.  Associated mild cerebral atrophy is noted.   5.  Atherosclerosis.      DX-ABDOMEN FOR TUBE PLACEMENT   Final Result      Feeding tube tip is stable in left abdomen in this patient with gastric bypass surgery      DX-ABDOMEN FOR TUBE PLACEMENT   Final Result      Feeding tube tip projects over the expected location of the proximal small bowel in this patient status post gastric bypass surgery      DX-ABDOMEN FOR TUBE PLACEMENT   Final Result      Enteric tube projects over the stomach.      CT-HEAD W/O   Final Result      1.  Again seen left-sided holohemispheric subdural hemorrhage with mixed density components and gas within overlying craniotomy. This measures 14 mm in depth and is unchanged.      2.  Again seen atrophy.      CT-HEAD W/O   Final Result      1.  Interval increase in size in left frontal  subdural hematoma measuring up to 14 mm in width with mass effect and midline shift from left to right of 2 mm.      2.  This was discussed with Physician: MAIDA PRITHCARD at 12:50 AM.      EC-ECHOCARDIOGRAM COMPLETE W/O CONT   Final Result      CT-HEAD W/O   Final Result      1.  Status post left frontal temporal craniotomy with evacuation of left subdural hematoma. Minimal residual hemorrhage and air is present in the left subdural space.      2.  No residual left to right midline shift.      3.  Underlying atrophy and small vessel ischemic changes again noted.      CT-HEAD W/O   Final Result      1.  Stable appearance of left frontal subdural hematoma with residual 7.5 mm left-to-right midline shift.      2.  No new hemorrhage.      3.  No herniation.      DX-CHEST-PORTABLE (1 VIEW)   Final Result      1.  Left basilar opacity may represent atelectasis or consolidation.   2.  Small left pleural effusion may be present.   3.  Atherosclerotic plaque.      MR-BRAIN-W/O   Final Result   Addendum 1 of 1   Findings were discussed with ROBERT BRYANT on 8/3/2021 1:44 PM.      Final      1.  There is subacute/chronic subdural and subarachnoid hemorrhages adjacent to the left frontal and temporal lobes. The maximum transverse dimension measures an approximately 29 mm. There is an approximately 6 mm midline shift towards right side.   2.  Mild cerebral volume loss.   3.  Mild chronic microvascular ischemic disease.      CT-HEAD W/O   Final Result      23 mm thick mixed density left frontotemporal convexity subdural hematoma with mass effect including 8 mm rightward midline shift/subfalcine herniation.      These findings were discussed with ROBERT BRYANT on 8/3/2021 9:01 AM.      DX-ABDOMEN COMPLETE WITH AP OR PA CXR   Final Result      1.  Small left pleural effusion.   2.  Moderate amount of colonic stool.   3.  No evidence of bowel obstruction.   4.  Scoliosis.        Recent Labs     08/09/21  0223 08/11/21  0400  "  SODIUM 134* 138   POTASSIUM 3.8 3.1*   CHLORIDE 98 103   CO2 28 25   GLUCOSE 155* 74   BUN 5* 5*     INR   Date Value Ref Range Status   08/10/2021 0.98 0.87 - 1.13 Final     Comment:     INR - Non-therapeutic Reference Range: 0.87-1.13  INR - Therapeutic Reference Range: 2.0-4.0       No results found for: POCINR     Intake/Output Summary (Last 24 hours) at 8/11/2021 0828  Last data filed at 8/11/2021 0600  Gross per 24 hour   Intake 110 ml   Output 2060 ml   Net -1950 ml      Labs not explicitly included in this progress note were reviewed by the author. Radiology/imaging not explicitly included in this progress note was reviewed by the author.     Past Medical History:   Diagnosis Date   • Anemia    • Anesthesia     \"takes more to get me under, would rather be intubated\"    • Breath shortness     cont oxygen 5L (Preffered)   • Bronchitis      2016   • Cardiomegaly    • Chronic fatigue and malaise    • Cirrhosis (HCC) December 2011    Status post liver transplant at [facility]   • CKD (chronic kidney disease) stage 3, GFR 30-59 ml/min ([facility])    • Diabetes ([facility])     reports hx of, resolved w/weight loss. Reports still checking bloodsugars twice daily and using insulin PRN   • Fracture of left foot    • GERD (gastroesophageal reflux disease)         • H/O Clostridium difficile infection 02-17-17    reports \"16 months ago\". Current stool sample 01-26-17 neg   • Hemorrhagic disorder (HCC)     \"low platelets\" bruises easily    • Hiatus hernia syndrome    • High cholesterol    • Hypothyroid    • Jaundice 2009   • Liver transplanted (HCC)    • Low back pain 02-17-17    and left foot, 7/10   • Mild aortic regurgitation and aortic valve sclerosis     • On home oxygen therapy     5 liters, Dr. Gaming   • Platelet disorder (HCC)     low platelets   • Pneumonia     aspiration,    • Psychiatric disorder     Mood disorder   • Pulmonary hypertension (HCC)        • S/P cholecystectomy    • Small bowel obstruction (HCC)     " 01-   • Splenomegaly    • VRE (vancomycin-resistant Enterococci)     02-17-17, pt declines knowledge of this        Home Medications    Medication Sig Taking? Last Dose Authorizing Provider   oxyCODONE immediate release (ROXICODONE) 10 MG immediate release tablet Take 1 tablet by mouth every 6 hours as needed for up to 5 days. Yes  Deja Busch M.D.   ALPRAZolam (XANAX) 1 MG Tab Take 1 mg by mouth 3 times a day as needed for Anxiety. Yes 8/2/2021 at 2000 Physician Outpatient   tadalafil (CIALIS) 20 MG tablet Take 20 mg by mouth at bedtime. Yes 8/2/2021 at 2000 Physician Outpatient   mycophenolate (CELLCEPT) 250 MG Cap Take 250 mg by mouth 2 times a day. Yes 8/2/2021 at 2000 Physician Outpatient   fludrocortisone (FLORINEF) 0.1 MG Tab Take 0.5 mg by mouth every day. Yes 8/2/2021 at am Physician Outpatient   gabapentin (NEURONTIN) 100 MG Cap Take 100 mg by mouth 2 times a day. Yes 8/2/2021 at 2000 Physician Outpatient   furosemide (LASIX) 20 MG Tab Take 20 mg by mouth every day. Yes 8/2/2021 at am Physician Outpatient   ambrisentan (LETAIRIS) 10 MG tablet Take 10 mg by mouth every day. Yes 8/2/2021 at 1200 Physician Outpatient   levothyroxine (SYNTHROID) 137 MCG Tab Take 137 mcg by mouth every morning on an empty stomach. Yes 8/2/2021 at am Physician Outpatient   metoclopramide (REGLAN) 10 MG Tab Take 10 mg by mouth as needed. Indications: Nausea and Vomiting Yes unknown at unknown  Physician Outpatient   pravastatin (PRAVACHOL) 20 MG Tab Take 20 mg by mouth every day. Yes 8/2/2021 at am Physician Outpatient   [facility] ESOMEPRAZOLE MAGNESIUM 40 MG PO CPDR Take 40 mg by mouth every day. Yes 8/2/2021 at am Physician Outpatient   tacrolimus (PROGRAF) 1 MG Cap Take 4 mg by mouth every day. Patient take 1mg + 3mg = 4mg Yes 8/2/2021 at am Physician Outpatient   sertraline (ZOLOFT) 100 MG Tab Take 100 mg by mouth every day. Yes 8/2/2021 at 2000 Physician Outpatient   Naloxegol Oxalate (MOVANTIK) 25 MG Tab Take 25 mg by  mouth every day. Yes 8/2/2021 at 1200 Physician Outpatient   levETIRAcetam (KEPPRA) 500 MG Tab Take 500 mg by mouth 2 times a day. Yes 8/2/2021 at 2000 Physician Outpatient   acetaminophen (TYLENOL) 500 MG Tab Take 500 mg by mouth every 6 hours as needed for Mild Pain. Yes 8/2/2021 at unknown Physician Outpatient   therapeutic multivitamin-minerals (THERAGRAN-M) Tab Take 1 tablet by mouth every day. Yes 8/2/2021 at 2000 Physician Outpatient   Calcium Carbonate (CALCIUM 500 PO) Take 1 tablet by mouth every day. Yes 8/2/2021 at 2000 Physician Outpatient   Hyoscyamine Sulfate (HYOSCYAMINE PO) Take 0.2 mg by mouth as needed. Yes 8/1/2021 at unknown  Physician Outpatient   meclizine (ANTIVERT) 12.5 MG Tab Take 25 mg by mouth as needed for Nausea/Vomiting. Yes 8/1/2021 at unknown Physician Outpatient   Prucalopride Succinate 2 MG Tab Take 2 mg by mouth every day. Yes 8/2/2021 at am Physician Outpatient       I have performed a physical exam and reviewed and updated ROS and Plan today (8/11/2021).     30 minutes in directly providing and coordinating care and extensive data review.  No time overlap and excludes procedures.

## 2021-08-11 NOTE — PROGRESS NOTES
"Hospital Medicine Daily Progress Note    Date of Service  8/11/2021    Chief Complaint  Roxana Cuba is a 61 y.o. female admitted 8/3/2021 with SDH    Hospital Course  Roxana Cuba is a 61 y.o. female with complex PMHx significant for COPD, chronic hypoxemic respiratory failure on 3L NC, pulmonary HTN, liver transplantation in 2011 for etoh cirrhosis, granulomatous hepatitis/sarcoid, Evon-en-Y gastric bypass 2018, MGUS, CKD, ?adrenal insufficiency, hypothyroidism who presented 8/3/2021 with worsening HA and visual changes x4 weeks after being diagnosed with a SDH secondary to ground level, mechanical fall 5 weeks ago while she was in [facility]. Patient also had laparotomy for \"twisting of the bowel\" in [facility] 5 weeks ago. No prior imaging available, but MRI done here reveals 29 mm subacute/chronic L SDH and SAH adjacent to L frontal/temporal lobes with 6 mm L->R shift. NSG was consulted and brought the patient to the OR and performed left craniotomy for a large subdural hematoma with membrane formation. Subgaleal drain was placed. Patient did have some hypotension in the ICU but this resolved. Electrolytes were repleted. Thus, she was able to be transferred out to the neurosurgical floor. She had some constipation so her MiraLAX was scheduled. Discontinued maintenance IVF.  She was preparing to discharge home with home health and oxygen when she developed intermittent confusion and slurred speech; so repeat head CT showed increase in SDH to 14 mm depth and 3.3 mm left to right midline shift.  Patient was transferred back to the ICU per neurosurgery.  On 8/8, patient had recurrent seizures so Dilantin was added.  Her home Lasix was resumed on 8/9. Pt was seen by neurology on 8/10 - has been seizures free for about 24hrs; dilantin switched to vimpat given hx of liver transplant.   8/11: Afebrile, vitals wnl, good UO. Still reports of ant headache but residing. Electrolytes K and Mg supplemented overnight. Deemed " stable to transfer out of ICU. NSX team planned for IR guided drainage of SDH.       Interval Problem Update  8/5: Internal medicine consulted and transferred out of ICU  8/6: Stable and preparing for discharge.  However, overnight, patient developed intermittent confusion and slurred speech.  Head CT ordered.  8/7: Head CT showed that the SDH had now increased to 14 mm in width.    8/8: With receptive and expressive aphasia.  Unable to perform swallow eval.  Substituting liquid sildenafil for PAH meds.  Discussed with pulmonologist Dr. Grady. Repleting magnesium and started on oral thiamine. Still in restraints.  8/9: Patient is critically ill - Multiple seizures overnight. Started on Dilantin.  Chest x-ray showed pulmonary edema.  BNP elevated at 4372.  Given a dose of Lasix.  Repleting phosphorus.  Checking urinalysis given dysuria.  8/10: No seizures (last noted 8/9 3AM). Mentation improving - AOx3-4 and to situation; speech quite clear; has occasional trouble findings words. NSX follow up - to discuss with family about possible washout of residual blood. Neurology consult Dr. Hemphill for seizures management - dilantin switched to vimpat given hx of liver transplant.     8/11: Afebrile, vitals wnl, good UO. Still reports of ant headache but residing. Electrolytes K and Mg supplemented overnight. Deemed stable to transfer out of ICU. NSX team planned for IR guided drainage of SDH.     I have personally seen and examined the patient at bedside. I discussed the plan of care with patient and bedside RN.    Consultants/Specialty  Neurosurgeon Dr. Arnold  Critical care Dr. Flores  Neurology Dr. Hemphill  Discussed with Pulmonologist Dr. Grady    Code Status  Full Code    Disposition  Patient is not medically cleared.   Anticipate discharge to - pending transfer out of the ICU. Tentatively planned for 8/11.   I have placed the appropriate orders for post-discharge needs.    Review of Systems  Review of Systems    Constitutional: Positive for malaise/fatigue.   Eyes: Negative for blurred vision and double vision.   Respiratory: Negative for cough and shortness of breath.    Cardiovascular: Negative for chest pain.   Gastrointestinal: Negative for abdominal pain.   Genitourinary: Negative for dysuria.   Musculoskeletal: Positive for myalgias. Negative for back pain and neck pain.   Neurological: Positive for seizures (Last 8/8 3 AM) and headaches. Negative for dizziness, speech change and focal weakness.        Physical Exam  Temp:  [36.2 °C (97.1 °F)-36.8 °C (98.2 °F)] 36.7 °C (98 °F)  Pulse:  [57-77] 57  Resp:  [9-26] 14  BP: ()/(50-64) 149/62  SpO2:  [93 %-100 %] 97 %    Physical Exam  Constitutional:       General: She is not in acute distress.     Appearance: She is well-developed.   HENT:      Head: Normocephalic.      Nose:      Comments: Cortrak in place     Mouth/Throat:      Mouth: Mucous membranes are dry.   Eyes:      General: No scleral icterus.     Conjunctiva/sclera: Conjunctivae normal.   Cardiovascular:      Rate and Rhythm: Normal rate.      Pulses: Normal pulses.      Heart sounds: No murmur heard.     Pulmonary:      Effort: Pulmonary effort is normal. No respiratory distress.      Breath sounds: No wheezing or rales.   Abdominal:      General: There is no distension.      Palpations: Abdomen is soft.      Tenderness: There is no abdominal tenderness.   Musculoskeletal:         General: Normal range of motion.      Cervical back: Normal range of motion and neck supple.      Right lower leg: Edema (trace) present.      Left lower leg: Edema (trace) present.   Skin:     General: Skin is warm.      Comments: Head staples in place CDI   Neurological:      Mental Status: She is alert.      Comments: AOx3-4 and to situation  Trouble finding words at time  Speech reasonable clear  Grossly intact neuro exam   Psychiatric:         Mood and Affect: Mood normal.         Fluids    Intake/Output Summary (Last 24  hours) at 8/11/2021 1138  Last data filed at 8/11/2021 1000  Gross per 24 hour   Intake 360 ml   Output 2060 ml   Net -1700 ml       Laboratory  Recent Labs     08/09/21 0223 08/11/21  0400   WBC 5.3 4.1*   RBC 3.02* 2.92*   HEMOGLOBIN 9.4* 9.1*   HEMATOCRIT 28.3* 28.5*   MCV 93.7 97.6   MCH 31.1 31.2   MCHC 33.2* 31.9*   RDW 55.0* 56.9*   PLATELETCT 143* 155*   MPV 9.2 9.5     Recent Labs     08/09/21 0223 08/11/21  0400   SODIUM 134* 138   POTASSIUM 3.8 3.1*   CHLORIDE 98 103   CO2 28 25   GLUCOSE 155* 74   BUN 5* 5*   CREATININE 0.35* 0.33*   CALCIUM 7.8* 7.2*     Recent Labs     08/10/21  0920   APTT 30.4   INR 0.98               Imaging  IR-MIDLINE CATHETER INSERTION WO GUIDANCE > AGE 3   Final Result                  Ultrasound-guided midline placement performed by qualified nursing staff    as above.          DX-CHEST-PORTABLE (1 VIEW)   Final Result         1.  Interstitial pulmonary parenchymal prominence suggest chronic underlying lung disease, component of interstitial edema and/or infiltrates not excluded.   2.  Small left pleural effusion   3.  Cardiomegaly   4.  Atherosclerosis      CT-HEAD W/O   Final Result         1.  Left subdural hematoma, stable since prior study.   2.  3.3 mm left right midline shift, similar compared to prior study.   3.  Minimal pneumocephalus, stable to slightly decreased since prior study.   4.  Nonspecific white matter changes, commonly associated with small vessel ischemic disease.  Associated mild cerebral atrophy is noted.   5.  Atherosclerosis.      DX-ABDOMEN FOR TUBE PLACEMENT   Final Result      Feeding tube tip is stable in left abdomen in this patient with gastric bypass surgery      DX-ABDOMEN FOR TUBE PLACEMENT   Final Result      Feeding tube tip projects over the expected location of the proximal small bowel in this patient status post gastric bypass surgery      DX-ABDOMEN FOR TUBE PLACEMENT   Final Result      Enteric tube projects over the stomach.       CT-HEAD W/O   Final Result      1.  Again seen left-sided holohemispheric subdural hemorrhage with mixed density components and gas within overlying craniotomy. This measures 14 mm in depth and is unchanged.      2.  Again seen atrophy.      CT-HEAD W/O   Final Result      1.  Interval increase in size in left frontal subdural hematoma measuring up to 14 mm in width with mass effect and midline shift from left to right of 2 mm.      2.  This was discussed with Physician: MAIDA PRITCHARD at 12:50 AM.      EC-ECHOCARDIOGRAM COMPLETE W/O CONT   Final Result      CT-HEAD W/O   Final Result      1.  Status post left frontal temporal craniotomy with evacuation of left subdural hematoma. Minimal residual hemorrhage and air is present in the left subdural space.      2.  No residual left to right midline shift.      3.  Underlying atrophy and small vessel ischemic changes again noted.      CT-HEAD W/O   Final Result      1.  Stable appearance of left frontal subdural hematoma with residual 7.5 mm left-to-right midline shift.      2.  No new hemorrhage.      3.  No herniation.      DX-CHEST-PORTABLE (1 VIEW)   Final Result      1.  Left basilar opacity may represent atelectasis or consolidation.   2.  Small left pleural effusion may be present.   3.  Atherosclerotic plaque.      MR-BRAIN-W/O   Final Result   Addendum 1 of 1   Findings were discussed with ROBERT BRYANT on 8/3/2021 1:44 PM.      Final      1.  There is subacute/chronic subdural and subarachnoid hemorrhages adjacent to the left frontal and temporal lobes. The maximum transverse dimension measures an approximately 29 mm. There is an approximately 6 mm midline shift towards right side.   2.  Mild cerebral volume loss.   3.  Mild chronic microvascular ischemic disease.      CT-HEAD W/O   Final Result      23 mm thick mixed density left frontotemporal convexity subdural hematoma with mass effect including 8 mm rightward midline shift/subfalcine herniation.       These findings were discussed with ROBERT BRYANT on 8/3/2021 9:01 AM.      DX-ABDOMEN COMPLETE WITH AP OR PA CXR   Final Result      1.  Small left pleural effusion.   2.  Moderate amount of colonic stool.   3.  No evidence of bowel obstruction.   4.  Scoliosis.           Assessment/Plan  * Traumatic subdural hematoma without loss of consciousness (HCC)- (present on admission)  Assessment & Plan  Secondary to GLF 5 weeks ago.   MRI 8/3: 29 mm L SDH w/ 6 mm L->R shift.   8/4 L craniotomy with subgaleal drain  Neurochecks  levetiracetam for seizure ppx, hold dvt ppx.  Drain removed by neurosurgery   On 8/7 developed worsening SDH confirmed on imaging along with slurred speech and then aphasia  Transferred back to ICU 8/7 for close monitoring    NSX follow up - to discuss with family about further management; IR procedure planned for 8/12.     Seizures (HCC)  Assessment & Plan  8/8 - 8/9 3AM with multiple seizures episodes  ?cortical irritation from SDH    In addition to keppra, dilatin added  Today 8/11 - now essentially seizures free for about 48hrs    Neurology consult Dr. Hemphill requested to help with seizures management - dilantin switched to vimpat given hx of liver transplant.       Dysphagia following nontraumatic intracerebral hemorrhage  Assessment & Plan  Cortrak placed  C/w SLP routine evals    Eye swelling, left- (present on admission)  Assessment & Plan  From recent surgery   Ice   No need for antibiotic     Resolving    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HCC)- (present on admission)  Assessment & Plan  Continue fludrocortisone  With some hypotension in ICU  Monitor blood pressures    GERD (gastroesophageal reflux disease)- (present on admission)  Assessment & Plan  Continue PPI    Pulmonary hypertension (HCC)- (present on admission)  Assessment & Plan  Chronic, not in acute RHF.   Echo 8/5 unremarkable and unchanged from September 2018. RSVP 35 mmHg  Previously on home ambrisentan and tadalafil.  Unable to get  above meds due to cortrak  Consulted pulmonologist Dr. Grady  Substitute with liquid sildenafil for now per pulm    Considered bosentan 62.5 but patient has liver transplant so will hold off to avoid hepatotoxicity    Monitor patient's oxygen requirements and fluid status.  If patient becomes more hypoxic, check BNP as well as chest x-ray.  If those are worsened with, start diuresis and re-check echo.  If PA pressure greater than 45 on repeat echo, notify pulmonology.    Gentle diuresis as BP tolerates    Chronic respiratory failure with hypoxia (HCC)- (present on admission)  Assessment & Plan  CXR showed L basilar opacity likely atelectasis. IS.   Home oxygen arrangement     Acquired hypothyroidism- (present on admission)  Assessment & Plan  Continue levothyroxine    Status post liver transplant Dr. Canada ([facility])- (present on admission)  Overview  -December 2011: Status post liver transplant for end stage liver disease at [facility], followed by Dr. Canada.        Assessment & Plan  For alcoholic cirrhosis  Continue home tacrolimus and mycophenolate       VTE prophylaxis: SCDs/TEDs    I have performed a physical exam and reviewed and updated ROS and Plan today (8/11/2021).

## 2021-08-11 NOTE — THERAPY
Speech Language Pathology  Daily Treatment     Patient Name: Roxana Cuba  Age:  61 y.o., Sex:  female  Medical Record #: [id number]  Today's Date: 8/11/2021     Precautions  Precautions: (P) Fall Risk, Swallow Precautions ( See Comments), Nasogastric Tube  Comments: (P) L crani    Assessment    Pt seen on this date for dysphagia therapy as pt is no longer NPO. Pt A&Ox4 and agreeable to therapy. She consumed trials of ice chips, MTL, purees, soft solids, or thin liquids with no overt s/sx of aspiration. Mastication slightly prolonged but functional for soft solids textures. Vocal quality clear following all trials and pt independently fed self. She c/o fatigue as session progressed. At this time, recommend that pt begin a soft and bite sized (SB6)/thin liquid diet with monitoring during meals and implementation of swallowing compensatory strategies (small bites/sips, up at 90* during meals, slow rate, straws okay). Pt to undergo procedure tomorrow so would recommend keeping cortrak in place to ensure tolerance of diet and to make sure pt meets nutritional needs. SLP following.    Plan    1) recommend that pt begin a soft and bite sized (SB6)/thin liquid diet with monitoring during meals and implementation of swallowing compensatory strategies (small bites/sips, up at 90* during meals, slow rate, straws okay)  2) keep cortrak in as pt undergoing procedure tomorrow, okay to d/c after 2-3 meals if pt is tolerating diet and meeting nutritional needs    Continue current treatment plan.    Discharge Recommendations: (P) Recommend home health for continued speech therapy services       Objective       08/11/21 1201   Vitals   O2 (LPM) 1   O2 Delivery Device Silicone Nasal Cannula   Pain 0 - 10 Group   Therapist Pain Assessment Post Activity Pain Same as Prior to Activity;Nurse Notified;0   Cognitive-Linguistic   Level of Consciousness Alert   Orientation Level Oriented x 4   Dysphagia    Dysphagia X   Positioning /  Behavior Modification Modulate Rate or Bite Size;Self Monitoring   Other Treatments pre-feeding trials   Diet / Liquid Recommendation Soft & Bite-Sized (6) - (Dysphagia III);Thin (0)   Nutritional Liquid Intake Rating Scale Non thickened beverages   Nutritional Food Intake Rating Scale Total oral diet with multiple consistencies but requiring special preparation or compensations   Nursing Communication Swallow Precaution Sign Posted at Head of Bed   Skilled Intervention Compensatory Strategies;Verbal Cueing   Recommended Route of Medication Administration   Medication Administration  Whole with Liquid Wash   Short Term Goals   Short Term Goal # 1 Pt will consume prefeeding trials with SLP and no overt s/sx concerning for aspiration.   Goal Outcome # 1 Goal met   Short Term Goal # 2 NEW 8/11: Pt will consume a soft and bite size/thin liquid diet with no overt s/sx of aspiration

## 2021-08-11 NOTE — PROGRESS NOTES
Patient does not have scheduled time for potential crani site washout today. Per doctor kennedy Nixon to administer morning medications.

## 2021-08-11 NOTE — PROGRESS NOTES
Neurosurgery Progress Note    Subjective:  S/p fall with left SDH.  POD#7 left craniotomy for SDH  Seizure activity overnight /. Keppra and dilantin on board.   Neurology consulted and Dr. Hemphill following     Mentation improving - AOx3-4 and to situation; speech quite clear; has occasional trouble findings words.   MMA embolization tomorrow       Exam:  Awake and alert  Alert and oriented x 3  EOMI, PERRL.  Moves extremities x4.  Slight R pronator drift   Incision intact  Has some edema to left eye as expected.     BP  Min: 92/51  Max: 149/62  Pulse  Av.7  Min: 57  Max: 77  Resp  Av.3  Min: 9  Max: 26  Temp  Av.5 °C (97.7 °F)  Min: 36.2 °C (97.1 °F)  Max: 36.8 °C (98.2 °F)  SpO2  Av.8 %  Min: 93 %  Max: 100 %    No data recorded    Recent Labs     21  0400   WBC 5.3 4.1*   RBC 3.02* 2.92*   HEMOGLOBIN 9.4* 9.1*   HEMATOCRIT 28.3* 28.5*   MCV 93.7 97.6   MCH 31.1 31.2   MCHC 33.2* 31.9*   RDW 55.0* 56.9*   PLATELETCT 143* 155*   MPV 9.2 9.5     Recent Labs     21  0400   SODIUM 134* 138   POTASSIUM 3.8 3.1*   CHLORIDE 98 103   CO2 28 25   GLUCOSE 155* 74   BUN 5* 5*   CREATININE 0.35* 0.33*   CALCIUM 7.8* 7.2*     Recent Labs     08/10/21  0920   APTT 30.4   INR 0.98           Intake/Output                       08/10/21 0700 - 21 0659 21 07 - 21 0659     [id number] Total  Total                 Intake    I.V.  --  -- --  100  -- 100    Volume (mL) (magnesium sulfate in D5W IVPB premix 1 g) -- -- -- 100 -- 100    Other  --  -- --  150  -- 150    Medications (PO/Enteral Liquids) -- -- -- 150 -- 150    NG/GT  110  0 110  0  -- 0    Intake (mL) (Enteral Tube 21 Cortrak - Gastric Left nare) 110 0 110 0 -- 0    IV Piggyback  110  -- 110  --  -- --    Volume (mL) (lacosamide (VIMPAT) 100 mg in  mL ivpb) 110 -- 110 -- -- --    Total Intake 220 0 220 250 -- 250       Output    Urine  1000  1060 2060   --  -- --    Number of Times Voided 1 x -- 1 x -- -- --    Number of Times Incontinent of Urine 1 x -- 1 x 1 x -- 1 x    Output (mL) ([REMOVED] External Urinary Catheter (Female Wick)) 1000 1060 2060 -- -- --    Total Output 1000 1060 2060 -- -- --       Net I/O     -780 -1060 -1840 250 -- 250            Intake/Output Summary (Last 24 hours) at 8/11/2021 1224  Last data filed at 8/11/2021 1000  Gross per 24 hour   Intake 360 ml   Output 1860 ml   Net -1500 ml            • levETIRAcetam  1,500 mg BID   • lacosamide (VIMPAT) ivpb  100 mg Q12HRS   • furosemide  20 mg DAILY   • artificial tears  1 Application PRN   • thiamine  100 mg DAILY   • sildenafil  20 mg TID   • LORazepam  2 mg Q10 MIN PRN   • Pharmacy  1 Each PHARMACY TO DOSE   • butalbital/apap/caffeine -40 mg  1 Tablet Q6HRS PRN   • oxyCODONE immediate-release  5 mg Q3HRS PRN    Or   • oxyCODONE immediate-release  10 mg Q3HRS PRN   • melatonin  5 mg Nightly   • sertraline  100 mg DAILY   • senna-docusate  1 Tablet Nightly   • senna-docusate  1 Tablet Q24HRS PRN   • polyethylene glycol/lytes  1 Packet DAILY   • acetaminophen  650 mg Q6HRS PRN   • pravastatin  20 mg DAILY   • [Held by provider] ambrisentan  10 mg DAILY   • cloNIDine  0.1 mg Q4HRS PRN   • gabapentin  100 mg BID   • levothyroxine  137 mcg AM ES   • fludrocortisone  0.5 mg DAILY   • diphenhydrAMINE  25 mg Q6HRS PRN    Or   • diphenhydrAMINE  25 mg Q6HRS PRN   • magnesium hydroxide  30 mL QDAY PRN   • ALPRAZolam  0.5 mg TID PRN   • mycophenolate  250 mg BID   • tacrolimus  4 mg DAILY   • docusate sodium  100 mg BID   • omeprazole  40 mg DAILY   • Pharmacy Consult Request  1 Each PHARMACY TO DOSE   • MD ALERT...DO NOT ADMINISTER NSAIDS or ASPIRIN unless ORDERED By Neurosurgery  1 Each PRN   • ondansetron  4 mg Q4HRS PRN   • diphenhydrAMINE  25 mg Q6HRS PRN   • labetalol  10 mg Q HOUR PRN   • hydrALAZINE  10 mg Q HOUR PRN   • bisacodyl  10 mg Q24HRS PRN   • fleet  1 Each Once PRN   •  benzocaine-menthol  1 Lozenge Q2HRS PRN   • [Held by provider] tadalafil  20 mg QHS       Assessment and Plan:  POD #7  Improving mentation  Seizures have stabilized at this point  Repeat head CT with larger left SDH with only 2mm MLS  Q4 hour neuro checks OK   OK to transfer to floor  MMA embolization tomorrow   NPO after midnight for procedure      Prophylactic anticoagulation: no         Start date/time: two weeks.

## 2021-08-12 ENCOUNTER — APPOINTMENT (OUTPATIENT)
Dept: RADIOLOGY | Facility: MEDICAL CENTER | Age: 61
DRG: 026 | End: 2021-08-12
Attending: NURSE PRACTITIONER
Payer: MEDICARE

## 2021-08-12 ENCOUNTER — ANESTHESIA EVENT (OUTPATIENT)
Dept: RADIOLOGY | Facility: MEDICAL CENTER | Age: 61
DRG: 026 | End: 2021-08-12
Payer: MEDICARE

## 2021-08-12 ENCOUNTER — ANESTHESIA (OUTPATIENT)
Dept: RADIOLOGY | Facility: MEDICAL CENTER | Age: 61
DRG: 026 | End: 2021-08-12
Payer: MEDICARE

## 2021-08-12 LAB
ANION GAP SERPL CALC-SCNC: 7 MMOL/L (ref 7–16)
BUN SERPL-MCNC: 8 MG/DL (ref 8–22)
CALCIUM SERPL-MCNC: 7.8 MG/DL (ref 8.5–10.5)
CHLORIDE SERPL-SCNC: 99 MMOL/L (ref 96–112)
CO2 SERPL-SCNC: 30 MMOL/L (ref 20–33)
CREAT SERPL-MCNC: 0.48 MG/DL (ref 0.5–1.4)
ERYTHROCYTE [DISTWIDTH] IN BLOOD BY AUTOMATED COUNT: 55.5 FL (ref 35.9–50)
GLUCOSE SERPL-MCNC: 127 MG/DL (ref 65–99)
HCT VFR BLD AUTO: 29.2 % (ref 37–47)
HGB BLD-MCNC: 9.2 G/DL (ref 12–16)
MAGNESIUM SERPL-MCNC: 1.7 MG/DL (ref 1.5–2.5)
MCH RBC QN AUTO: 30.2 PG (ref 27–33)
MCHC RBC AUTO-ENTMCNC: 31.5 G/DL (ref 33.6–35)
MCV RBC AUTO: 95.7 FL (ref 81.4–97.8)
PLATELET # BLD AUTO: 184 K/UL (ref 164–446)
PMV BLD AUTO: 9.6 FL (ref 9–12.9)
POTASSIUM SERPL-SCNC: 3.5 MMOL/L (ref 3.6–5.5)
RBC # BLD AUTO: 3.05 M/UL (ref 4.2–5.4)
SARS-COV+SARS-COV-2 AG RESP QL IA.RAPID: NOTDETECTED
SODIUM SERPL-SCNC: 136 MMOL/L (ref 135–145)
SPECIMEN SOURCE: NORMAL
WBC # BLD AUTO: 4.3 K/UL (ref 4.8–10.8)

## 2021-08-12 PROCEDURE — A9270 NON-COVERED ITEM OR SERVICE: HCPCS | Performed by: ANESTHESIOLOGY

## 2021-08-12 PROCEDURE — A9270 NON-COVERED ITEM OR SERVICE: HCPCS | Performed by: STUDENT IN AN ORGANIZED HEALTH CARE EDUCATION/TRAINING PROGRAM

## 2021-08-12 PROCEDURE — 700102 HCHG RX REV CODE 250 W/ 637 OVERRIDE(OP): Performed by: STUDENT IN AN ORGANIZED HEALTH CARE EDUCATION/TRAINING PROGRAM

## 2021-08-12 PROCEDURE — 75894 X-RAYS TRANSCATH THERAPY: CPT

## 2021-08-12 PROCEDURE — 75898 FOLLOW-UP ANGIOGRAPHY: CPT

## 2021-08-12 PROCEDURE — 160002 HCHG RECOVERY MINUTES (STAT)

## 2021-08-12 PROCEDURE — 80048 BASIC METABOLIC PNL TOTAL CA: CPT

## 2021-08-12 PROCEDURE — 36227 PLACE CATH XTRNL CAROTID: CPT

## 2021-08-12 PROCEDURE — 61626 TCAT PERM OCCLS/EMBOL NONCNS: CPT

## 2021-08-12 PROCEDURE — C9254 INJECTION, LACOSAMIDE: HCPCS | Performed by: PSYCHIATRY & NEUROLOGY

## 2021-08-12 PROCEDURE — 700105 HCHG RX REV CODE 258: Performed by: PSYCHIATRY & NEUROLOGY

## 2021-08-12 PROCEDURE — 700111 HCHG RX REV CODE 636 W/ 250 OVERRIDE (IP): Performed by: PSYCHIATRY & NEUROLOGY

## 2021-08-12 PROCEDURE — 700102 HCHG RX REV CODE 250 W/ 637 OVERRIDE(OP): Performed by: HOSPITALIST

## 2021-08-12 PROCEDURE — 700101 HCHG RX REV CODE 250: Performed by: ANESTHESIOLOGY

## 2021-08-12 PROCEDURE — 700102 HCHG RX REV CODE 250 W/ 637 OVERRIDE(OP): Performed by: ANESTHESIOLOGY

## 2021-08-12 PROCEDURE — 700111 HCHG RX REV CODE 636 W/ 250 OVERRIDE (IP): Performed by: ANESTHESIOLOGY

## 2021-08-12 PROCEDURE — 36223 PLACE CATH CAROTID/INOM ART: CPT

## 2021-08-12 PROCEDURE — A9270 NON-COVERED ITEM OR SERVICE: HCPCS | Performed by: HOSPITALIST

## 2021-08-12 PROCEDURE — 700111 HCHG RX REV CODE 636 W/ 250 OVERRIDE (IP): Performed by: STUDENT IN AN ORGANIZED HEALTH CARE EDUCATION/TRAINING PROGRAM

## 2021-08-12 PROCEDURE — 700105 HCHG RX REV CODE 258: Performed by: ANESTHESIOLOGY

## 2021-08-12 PROCEDURE — 83735 ASSAY OF MAGNESIUM: CPT

## 2021-08-12 PROCEDURE — 700111 HCHG RX REV CODE 636 W/ 250 OVERRIDE (IP)

## 2021-08-12 PROCEDURE — 700117 HCHG RX CONTRAST REV CODE 255: Performed by: NURSE PRACTITIONER

## 2021-08-12 PROCEDURE — 87426 SARSCOV CORONAVIRUS AG IA: CPT

## 2021-08-12 PROCEDURE — 85027 COMPLETE CBC AUTOMATED: CPT

## 2021-08-12 PROCEDURE — 770006 HCHG ROOM/CARE - MED/SURG/GYN SEMI*

## 2021-08-12 PROCEDURE — 99232 SBSQ HOSP IP/OBS MODERATE 35: CPT | Performed by: INTERNAL MEDICINE

## 2021-08-12 RX ORDER — LIDOCAINE HYDROCHLORIDE 40 MG/ML
SOLUTION TOPICAL PRN
Status: DISCONTINUED | OUTPATIENT
Start: 2021-08-12 | End: 2021-08-12 | Stop reason: SURG

## 2021-08-12 RX ORDER — HEPARIN SODIUM 1000 [USP'U]/ML
INJECTION, SOLUTION INTRAVENOUS; SUBCUTANEOUS
Status: COMPLETED
Start: 2021-08-12 | End: 2021-08-12

## 2021-08-12 RX ORDER — ONDANSETRON 2 MG/ML
4 INJECTION INTRAMUSCULAR; INTRAVENOUS
Status: DISCONTINUED | OUTPATIENT
Start: 2021-08-12 | End: 2021-08-12 | Stop reason: HOSPADM

## 2021-08-12 RX ORDER — HYDRALAZINE HYDROCHLORIDE 20 MG/ML
5 INJECTION INTRAMUSCULAR; INTRAVENOUS
Status: DISCONTINUED | OUTPATIENT
Start: 2021-08-12 | End: 2021-08-12 | Stop reason: HOSPADM

## 2021-08-12 RX ORDER — HEPARIN SODIUM,PORCINE 1000/ML
VIAL (ML) INJECTION PRN
Status: DISCONTINUED | OUTPATIENT
Start: 2021-08-12 | End: 2021-08-12 | Stop reason: SURG

## 2021-08-12 RX ORDER — CEFAZOLIN SODIUM 1 G/3ML
INJECTION, POWDER, FOR SOLUTION INTRAMUSCULAR; INTRAVENOUS PRN
Status: DISCONTINUED | OUTPATIENT
Start: 2021-08-12 | End: 2021-08-12 | Stop reason: SURG

## 2021-08-12 RX ORDER — HALOPERIDOL 5 MG/ML
1 INJECTION INTRAMUSCULAR
Status: DISCONTINUED | OUTPATIENT
Start: 2021-08-12 | End: 2021-08-12 | Stop reason: HOSPADM

## 2021-08-12 RX ORDER — OXYCODONE HCL 5 MG/5 ML
10 SOLUTION, ORAL ORAL
Status: COMPLETED | OUTPATIENT
Start: 2021-08-12 | End: 2021-08-12

## 2021-08-12 RX ORDER — DIPHENHYDRAMINE HYDROCHLORIDE 50 MG/ML
12.5 INJECTION INTRAMUSCULAR; INTRAVENOUS
Status: DISCONTINUED | OUTPATIENT
Start: 2021-08-12 | End: 2021-08-12 | Stop reason: HOSPADM

## 2021-08-12 RX ORDER — SODIUM CHLORIDE 9 MG/ML
INJECTION, SOLUTION INTRAVENOUS
Status: DISCONTINUED | OUTPATIENT
Start: 2021-08-12 | End: 2021-08-12 | Stop reason: SURG

## 2021-08-12 RX ORDER — HYDROMORPHONE HYDROCHLORIDE 1 MG/ML
0.2 INJECTION, SOLUTION INTRAMUSCULAR; INTRAVENOUS; SUBCUTANEOUS
Status: DISCONTINUED | OUTPATIENT
Start: 2021-08-12 | End: 2021-08-12 | Stop reason: HOSPADM

## 2021-08-12 RX ORDER — MIDAZOLAM HYDROCHLORIDE 1 MG/ML
INJECTION INTRAMUSCULAR; INTRAVENOUS
Status: COMPLETED
Start: 2021-08-12 | End: 2021-08-12

## 2021-08-12 RX ORDER — SODIUM CHLORIDE, SODIUM LACTATE, POTASSIUM CHLORIDE, CALCIUM CHLORIDE 600; 310; 30; 20 MG/100ML; MG/100ML; MG/100ML; MG/100ML
INJECTION, SOLUTION INTRAVENOUS CONTINUOUS
Status: DISCONTINUED | OUTPATIENT
Start: 2021-08-12 | End: 2021-08-12 | Stop reason: HOSPADM

## 2021-08-12 RX ORDER — OXYCODONE HCL 5 MG/5 ML
5 SOLUTION, ORAL ORAL
Status: COMPLETED | OUTPATIENT
Start: 2021-08-12 | End: 2021-08-12

## 2021-08-12 RX ORDER — HYDROMORPHONE HYDROCHLORIDE 1 MG/ML
0.1 INJECTION, SOLUTION INTRAMUSCULAR; INTRAVENOUS; SUBCUTANEOUS
Status: DISCONTINUED | OUTPATIENT
Start: 2021-08-12 | End: 2021-08-12 | Stop reason: HOSPADM

## 2021-08-12 RX ORDER — MIDAZOLAM HYDROCHLORIDE 1 MG/ML
1 INJECTION INTRAMUSCULAR; INTRAVENOUS
Status: DISCONTINUED | OUTPATIENT
Start: 2021-08-12 | End: 2021-08-12 | Stop reason: HOSPADM

## 2021-08-12 RX ORDER — CEFAZOLIN SODIUM 1 G/3ML
INJECTION, POWDER, FOR SOLUTION INTRAMUSCULAR; INTRAVENOUS
Status: COMPLETED
Start: 2021-08-12 | End: 2021-08-12

## 2021-08-12 RX ORDER — LIDOCAINE HYDROCHLORIDE 40 MG/ML
SOLUTION TOPICAL
Status: COMPLETED
Start: 2021-08-12 | End: 2021-08-12

## 2021-08-12 RX ORDER — LIDOCAINE HYDROCHLORIDE 20 MG/ML
INJECTION, SOLUTION EPIDURAL; INFILTRATION; INTRACAUDAL; PERINEURAL PRN
Status: DISCONTINUED | OUTPATIENT
Start: 2021-08-12 | End: 2021-08-12 | Stop reason: SURG

## 2021-08-12 RX ORDER — METOPROLOL TARTRATE 1 MG/ML
1 INJECTION, SOLUTION INTRAVENOUS
Status: DISCONTINUED | OUTPATIENT
Start: 2021-08-12 | End: 2021-08-12 | Stop reason: HOSPADM

## 2021-08-12 RX ORDER — MEPERIDINE HYDROCHLORIDE 25 MG/ML
12.5 INJECTION INTRAMUSCULAR; INTRAVENOUS; SUBCUTANEOUS
Status: DISCONTINUED | OUTPATIENT
Start: 2021-08-12 | End: 2021-08-12 | Stop reason: HOSPADM

## 2021-08-12 RX ORDER — LABETALOL HYDROCHLORIDE 5 MG/ML
5 INJECTION, SOLUTION INTRAVENOUS
Status: DISCONTINUED | OUTPATIENT
Start: 2021-08-12 | End: 2021-08-12 | Stop reason: HOSPADM

## 2021-08-12 RX ORDER — HYDROMORPHONE HYDROCHLORIDE 1 MG/ML
0.4 INJECTION, SOLUTION INTRAMUSCULAR; INTRAVENOUS; SUBCUTANEOUS
Status: DISCONTINUED | OUTPATIENT
Start: 2021-08-12 | End: 2021-08-12 | Stop reason: HOSPADM

## 2021-08-12 RX ADMIN — GABAPENTIN 100 MG: 100 CAPSULE ORAL at 18:45

## 2021-08-12 RX ADMIN — SILDENAFIL 20 MG: 20 TABLET ORAL at 18:45

## 2021-08-12 RX ADMIN — CEFAZOLIN 2 G: 330 INJECTION, POWDER, FOR SOLUTION INTRAMUSCULAR; INTRAVENOUS at 14:11

## 2021-08-12 RX ADMIN — OXYCODONE HYDROCHLORIDE 10 MG: 10 TABLET ORAL at 03:13

## 2021-08-12 RX ADMIN — PROPOFOL 150 MG: 10 INJECTION, EMULSION INTRAVENOUS at 14:02

## 2021-08-12 RX ADMIN — SUGAMMADEX 200 MG: 100 INJECTION, SOLUTION INTRAVENOUS at 15:15

## 2021-08-12 RX ADMIN — IOHEXOL 75 ML: 300 INJECTION, SOLUTION INTRAVENOUS at 16:00

## 2021-08-12 RX ADMIN — HEPARIN SODIUM 3000 UNITS: 1000 INJECTION, SOLUTION INTRAVENOUS; SUBCUTANEOUS at 14:39

## 2021-08-12 RX ADMIN — Medication 5 MG: at 22:27

## 2021-08-12 RX ADMIN — OXYCODONE HYDROCHLORIDE 10 MG: 5 SOLUTION ORAL at 17:01

## 2021-08-12 RX ADMIN — FENTANYL CITRATE 25 MCG: 50 INJECTION, SOLUTION INTRAMUSCULAR; INTRAVENOUS at 17:20

## 2021-08-12 RX ADMIN — LIDOCAINE HYDROCHLORIDE 4 ML: 40 SOLUTION TOPICAL at 14:02

## 2021-08-12 RX ADMIN — SILDENAFIL 20 MG: 20 TABLET ORAL at 06:02

## 2021-08-12 RX ADMIN — SODIUM CHLORIDE 100 MG: 9 INJECTION, SOLUTION INTRAVENOUS at 06:01

## 2021-08-12 RX ADMIN — SODIUM CHLORIDE 100 MG: 9 INJECTION, SOLUTION INTRAVENOUS at 18:32

## 2021-08-12 RX ADMIN — OXYCODONE HYDROCHLORIDE 10 MG: 10 TABLET ORAL at 06:28

## 2021-08-12 RX ADMIN — SILDENAFIL 20 MG: 20 TABLET ORAL at 11:56

## 2021-08-12 RX ADMIN — MYCOPHENOLATE MOFETIL 250 MG: 200 POWDER, FOR SUSPENSION ORAL at 18:47

## 2021-08-12 RX ADMIN — FLUDROCORTISONE ACETATE 0.5 MG: 0.1 TABLET ORAL at 06:03

## 2021-08-12 RX ADMIN — LEVETIRACETAM 1500 MG: 500 TABLET ORAL at 18:32

## 2021-08-12 RX ADMIN — PRAVASTATIN SODIUM 20 MG: 20 TABLET ORAL at 06:02

## 2021-08-12 RX ADMIN — OMEPRAZOLE 40 MG: KIT at 06:02

## 2021-08-12 RX ADMIN — MIDAZOLAM 2 MG: 1 INJECTION INTRAMUSCULAR; INTRAVENOUS at 14:00

## 2021-08-12 RX ADMIN — FENTANYL CITRATE 25 MCG: 50 INJECTION, SOLUTION INTRAMUSCULAR; INTRAVENOUS at 16:38

## 2021-08-12 RX ADMIN — OXYCODONE HYDROCHLORIDE 10 MG: 10 TABLET ORAL at 11:54

## 2021-08-12 RX ADMIN — DOCUSATE SODIUM 50 MG AND SENNOSIDES 8.6 MG 1 TABLET: 8.6; 5 TABLET, FILM COATED ORAL at 20:21

## 2021-08-12 RX ADMIN — DOCUSATE SODIUM 100 MG: 50 LIQUID ORAL at 18:45

## 2021-08-12 RX ADMIN — DOCUSATE SODIUM 100 MG: 50 LIQUID ORAL at 06:02

## 2021-08-12 RX ADMIN — Medication 100 MG: at 06:03

## 2021-08-12 RX ADMIN — LEVETIRACETAM 1500 MG: 500 TABLET ORAL at 06:03

## 2021-08-12 RX ADMIN — OXYCODONE HYDROCHLORIDE 10 MG: 10 TABLET ORAL at 23:35

## 2021-08-12 RX ADMIN — GABAPENTIN 100 MG: 100 CAPSULE ORAL at 06:03

## 2021-08-12 RX ADMIN — SODIUM CHLORIDE: 9 INJECTION, SOLUTION INTRAVENOUS at 13:57

## 2021-08-12 RX ADMIN — SERTRALINE HYDROCHLORIDE 100 MG: 100 TABLET ORAL at 06:02

## 2021-08-12 RX ADMIN — MYCOPHENOLATE MOFETIL 250 MG: 200 POWDER, FOR SUSPENSION ORAL at 06:02

## 2021-08-12 RX ADMIN — LEVOTHYROXINE SODIUM 137 MCG: 0.03 TABLET ORAL at 06:02

## 2021-08-12 RX ADMIN — FUROSEMIDE 20 MG: 40 TABLET ORAL at 06:02

## 2021-08-12 RX ADMIN — FENTANYL CITRATE 25 MCG: 50 INJECTION, SOLUTION INTRAMUSCULAR; INTRAVENOUS at 16:51

## 2021-08-12 RX ADMIN — TACROLIMUS 4 MG: 5 CAPSULE ORAL at 18:47

## 2021-08-12 RX ADMIN — LIDOCAINE HYDROCHLORIDE 100 MG: 20 INJECTION, SOLUTION EPIDURAL; INFILTRATION; INTRACAUDAL at 14:02

## 2021-08-12 RX ADMIN — FENTANYL CITRATE 100 MCG: 50 INJECTION, SOLUTION INTRAMUSCULAR; INTRAVENOUS at 14:02

## 2021-08-12 RX ADMIN — ROCURONIUM BROMIDE 50 MG: 10 INJECTION, SOLUTION INTRAVENOUS at 14:02

## 2021-08-12 RX ADMIN — FENTANYL CITRATE 25 MCG: 50 INJECTION, SOLUTION INTRAMUSCULAR; INTRAVENOUS at 17:06

## 2021-08-12 RX ADMIN — OXYCODONE HYDROCHLORIDE 10 MG: 10 TABLET ORAL at 20:21

## 2021-08-12 ASSESSMENT — ENCOUNTER SYMPTOMS
NECK PAIN: 0
MYALGIAS: 0
HEADACHES: 0
SPEECH CHANGE: 0
FOCAL WEAKNESS: 0
DIZZINESS: 0
DOUBLE VISION: 0
BLURRED VISION: 0
ABDOMINAL PAIN: 0
BACK PAIN: 0
COUGH: 0
SHORTNESS OF BREATH: 0
SEIZURES: 0

## 2021-08-12 ASSESSMENT — PAIN SCALES - GENERAL: PAIN_LEVEL: 0

## 2021-08-12 NOTE — CARE PLAN
The patient is Stable - Low risk of patient condition declining or worsening    Shift Goals  Clinical Goals: Monitor BP   Patient Goals: Pain control   Family Goals: N/A    Progress made toward(s) clinical / shift goals:  BP low this evening. 500 mL bolus administered per MD order. SPB back up in the 100's. Will continue to monitor.     Patient is not progressing towards the following goals:      Problem: Early Mobilization - Post Surgery  Goal: Early mobilization post surgery  Outcome: Progressing  Note: Patient encouraged to mobilize throughout shift. Patient up to bathroom with one person, hand held assist. Gait slightly unsteady.      Problem: Fall Risk  Goal: Patient will remain free from falls  Outcome: Progressing  Note: Patient slightly impulsive. Assured fall/safety precautions in place. Bed alarm on and functioning properly.

## 2021-08-12 NOTE — ANESTHESIA TIME REPORT
Anesthesia Start and Stop Event Times     Date Time Event    8/12/2021 1326 Ready for Procedure     1357 Anesthesia Start     1532 Anesthesia Stop        Responsible Staff  08/12/21    Name Role Begin End    Dami Centeno D.O. Anesth 1357 1532        Preop Diagnosis (Free Text):  Pre-op Diagnosis             Preop Diagnosis (Codes):    Post op Diagnosis  SDH (subdural hematoma) (HCC)      Premium Reason  Non-Premium    Comments:

## 2021-08-12 NOTE — PROGRESS NOTES
Neurosurgery Progress Note    Subjective:  S/p fall with left SDH.  POD#8 left craniotomy for SDH  Seizure activity overnight 8/. Keppra and dilantin on board.   Neurology consulted and Dr. Hemphill following     Mentation improving - AOx3-4 and to situation; speech quite clear; has occasional trouble findings words.   MMA embolization today      Exam:  Awake and alert  Alert and oriented x 3  EOMI, PERRL.  Moves extremities x4.  Slight R pronator drift   Incision intact  Has some edema to left eye as expected.     BP  Min: 91/51  Max: 138/64  Pulse  Av.4  Min: 61  Max: 76  Resp  Av.4  Min: 8  Max: 18  Temp  Av.8 °C (98.2 °F)  Min: 36 °C (96.8 °F)  Max: 37.2 °C (99 °F)  SpO2  Av.9 %  Min: 96 %  Max: 100 %    No data recorded    Recent Labs     21  0400 21  0319   WBC 4.1* 4.3*   RBC 2.92* 3.05*   HEMOGLOBIN 9.1* 9.2*   HEMATOCRIT 28.5* 29.2*   MCV 97.6 95.7   MCH 31.2 30.2   MCHC 31.9* 31.5*   RDW 56.9* 55.5*   PLATELETCT 155* 184   MPV 9.5 9.6     Recent Labs     21  0400 21  0319   SODIUM 138 136   POTASSIUM 3.1* 3.5*   CHLORIDE 103 99   CO2 25 30   GLUCOSE 74 127*   BUN 5* 8   CREATININE 0.33* 0.48*   CALCIUM 7.2* 7.8*     Recent Labs     08/10/21  0920   APTT 30.4   INR 0.98           Intake/Output                       21 0700 - 21 0659 21 07 - 21 0659     [id number] Total 1900-0659 Total                 Intake    P.O.  150  -- 150  --  -- --    P.O. 150 -- 150 -- -- --    I.V.  100  -- 100  --  -- --    Volume (mL) (magnesium sulfate in D5W IVPB premix 1 g) 100 -- 100 -- -- --    Other  270  -- 270  --  -- --    Medications (PO/Enteral Liquids) 270 -- 270 -- -- --    NG/GT  50  -- 50  --  -- --    Intake (mL) (Enteral Tube 21 Cortrak - Gastric Left nare) 50 -- 50 -- -- --    Total Intake 570 -- 570 -- -- --       Output    Urine  400  -- 400  --  -- --    Number of Times Voided -- 5 x 5 x -- -- --    Number of Times  Incontinent of Urine 1 x -- 1 x -- -- --    Output (mL) (External Urinary Catheter (Female Wick)) 400 -- 400 -- -- --    Total Output 400 -- 400 -- -- --       Net I/O     170 -- 170 -- -- --            Intake/Output Summary (Last 24 hours) at 8/12/2021 1211  Last data filed at 8/11/2021 1400  Gross per 24 hour   Intake 210 ml   Output 400 ml   Net -190 ml            • fentaNYL      • midazolam      • propofol      • ceFAZolin      • lidocaine 4%      • sugammadex      • levETIRAcetam  1,500 mg BID   • lacosamide (VIMPAT) ivpb  100 mg Q12HRS   • furosemide  20 mg DAILY   • artificial tears  1 Application PRN   • thiamine  100 mg DAILY   • sildenafil  20 mg TID   • LORazepam  2 mg Q10 MIN PRN   • Pharmacy  1 Each PHARMACY TO DOSE   • butalbital/apap/caffeine -40 mg  1 Tablet Q6HRS PRN   • oxyCODONE immediate-release  5 mg Q3HRS PRN    Or   • oxyCODONE immediate-release  10 mg Q3HRS PRN   • melatonin  5 mg Nightly   • sertraline  100 mg DAILY   • senna-docusate  1 Tablet Nightly   • senna-docusate  1 Tablet Q24HRS PRN   • polyethylene glycol/lytes  1 Packet DAILY   • acetaminophen  650 mg Q6HRS PRN   • pravastatin  20 mg DAILY   • [Held by provider] ambrisentan  10 mg DAILY   • cloNIDine  0.1 mg Q4HRS PRN   • gabapentin  100 mg BID   • levothyroxine  137 mcg AM ES   • fludrocortisone  0.5 mg DAILY   • diphenhydrAMINE  25 mg Q6HRS PRN    Or   • diphenhydrAMINE  25 mg Q6HRS PRN   • magnesium hydroxide  30 mL QDAY PRN   • ALPRAZolam  0.5 mg TID PRN   • mycophenolate  250 mg BID   • tacrolimus  4 mg DAILY   • docusate sodium  100 mg BID   • omeprazole  40 mg DAILY   • Pharmacy Consult Request  1 Each PHARMACY TO DOSE   • MD ALERT...DO NOT ADMINISTER NSAIDS or ASPIRIN unless ORDERED By Neurosurgery  1 Each PRN   • ondansetron  4 mg Q4HRS PRN   • diphenhydrAMINE  25 mg Q6HRS PRN   • labetalol  10 mg Q HOUR PRN   • hydrALAZINE  10 mg Q HOUR PRN   • bisacodyl  10 mg Q24HRS PRN   • fleet  1 Each Once PRN   •  benzocaine-menthol  1 Lozenge Q2HRS PRN   • [Held by provider] tadalafil  20 mg QHS       Assessment and Plan:  POD #8  Improving mentation  Seizures have stabilized at this point  Repeat head CT with larger left SDH with only 2mm MLS  Q4 hour neuro checks OK   MMA embolization today  NPO after midnight for procedure      Prophylactic anticoagulation: no         Start date/time: two weeks.

## 2021-08-12 NOTE — ANESTHESIA POSTPROCEDURE EVALUATION
Patient: Roxana Cuba    Procedure Summary     Date: 08/12/21 Room / Location: [facility]    Anesthesia Start: 1357 Anesthesia Stop: 1532    Procedure: IR-EMBOLIZE-NEURO-INTRACRANIAL Diagnosis:       SDH (subdural hematoma) (HCC)      Subdural hematoma (HCC)      SDH (subdural hematoma) (HCC)      Subdural hematoma (HCC)      (Left MMA embolization scheduled for 8/12/21 with GA)    Scheduled Providers: Brandon Gonzalez M.D.; Dami Centeno D.O. Responsible Provider: Dami Centeno D.O.    Anesthesia Type: general ASA Status: 3          Final Anesthesia Type: general  Last vitals  BP   Blood Pressure: 135/70    Temp   37.2 °C (99 °F)    Pulse   67   Resp   16    SpO2   100 %      Anesthesia Post Evaluation    Patient location during evaluation: PACU  Patient participation: complete - patient participated  Level of consciousness: awake and alert  Pain score: 0    Airway patency: patent  Anesthetic complications: no  Cardiovascular status: hemodynamically stable  Respiratory status: acceptable  Hydration status: euvolemic    PONV: none          There were no known complications for this encounter.     Nurse Pain Score: 0 (NPRS)

## 2021-08-12 NOTE — ANESTHESIA PREPROCEDURE EVALUATION
Relevant Problems   PULMONARY   (positive) History of Clostridium difficile infection   (positive) History of infection with vancomycin resistant Enterococcus (VRE)      NEURO   (positive) H/O non-ST elevation myocardial infarction (NSTEMI)   (positive) History of Clostridium difficile infection   (positive) History of aspiration pneumonia   (positive) History of infection with vancomycin resistant Enterococcus (VRE)   (positive) History of pancreatitis   (positive) History of pneumonia   (positive) History of small bowel obstruction   (positive) Seizures (HCC)      CARDIAC   (positive) Hepatopulmonary syndrome (HCC)   (positive) Mild aortic regurgitation and aortic valve sclerosis   (positive) Mild mitral regurgitation   (positive) Primary pulmonary hypertension (HCC)   (positive) Pulmonary hypertension (HCC)      GI   (positive) GERD (gastroesophageal reflux disease)   (positive) Hiatal hernia         (positive) CKD (chronic kidney disease) stage 3, GFR 30-59 ml/min- unclear cause, probably multifactorial   (positive) Hepatopulmonary syndrome (HCC)      ENDO   (positive) Acquired hypothyroidism      Other   (positive) Splenic lesion   (positive) Splenomegaly       Physical Exam    Airway   Mallampati: II  TM distance: >3 FB  Neck ROM: full       Cardiovascular - normal exam  Rhythm: regular  Rate: normal  (-) murmur     Dental - normal exam           Pulmonary - normal exam  Breath sounds clear to auscultation     Abdominal    Neurological - normal exam                 Anesthesia Plan    ASA 3       Plan - general       Airway plan will be ETT          Induction: intravenous    Postoperative Plan: Postoperative administration of opioids is intended.    Pertinent diagnostic labs and testing reviewed    Informed Consent:    Anesthetic plan and risks discussed with patient.    Use of blood products discussed with: patient whom consented to blood products.

## 2021-08-12 NOTE — PROGRESS NOTES
"Hospital Medicine Daily Progress Note    Date of Service  8/12/2021    Chief Complaint  Roxana Cuba is a 61 y.o. female admitted 8/3/2021 with SDH    Hospital Course  Roxana Cuba is a 61 y.o. female with complex PMHx significant for COPD, chronic hypoxemic respiratory failure on 3L NC, pulmonary HTN, liver transplantation in 2011 for etoh cirrhosis, granulomatous hepatitis/sarcoid, Evon-en-Y gastric bypass 2018, MGUS, CKD, ?adrenal insufficiency, hypothyroidism who presented 8/3/2021 with worsening HA and visual changes x4 weeks after being diagnosed with a SDH secondary to ground level, mechanical fall 5 weeks ago while she was in [facility]. Patient also had laparotomy for \"twisting of the bowel\" in [facility] 5 weeks ago. No prior imaging available, but MRI done here reveals 29 mm subacute/chronic L SDH and SAH adjacent to L frontal/temporal lobes with 6 mm L->R shift. NSG was consulted and brought the patient to the OR and performed left craniotomy for a large subdural hematoma with membrane formation. Subgaleal drain was placed. Patient did have some hypotension in the ICU but this resolved. Electrolytes were repleted. Thus, she was able to be transferred out to the neurosurgical floor. She was preparing to discharge home with home health and oxygen when she developed intermittent confusion and slurred speech; so repeat head CT showed increase in SDH to 14 mm depth and 3.3 mm left to right midline shift.  Patient was transferred back to the ICU per neurosurgery.  On 8/8, patient had recurrent seizures so Dilantin was added.  Her home Lasix was resumed on 8/9. Pt was seen by neurology on 8/10 - has been seizures free for about 24hrs; dilantin switched to vimpat given hx /of liver transplant. She is scheduled for MMA procedure 8/12.  8/11: Afebrile, vitals wnl, good UO. Still reports of ant headache but residing. Electrolytes K and Mg supplemented overnight. Deemed stable to transfer out of ICU. NSX team " planned for IR guided drainage of SDH.       Interval Problem Update  8/5: Internal medicine consulted and transferred out of ICU  8/6: Stable and preparing for discharge.  However, overnight, patient developed intermittent confusion and slurred speech.  Head CT ordered.  8/7: Head CT showed that the SDH had now increased to 14 mm in width.    8/8: With receptive and expressive aphasia.  Unable to perform swallow eval.  Substituting liquid sildenafil for PAH meds.  Discussed with pulmonologist Dr. Grady. Repleting magnesium and started on oral thiamine. Still in restraints.  8/9: Patient is critically ill - Multiple seizures overnight. Started on Dilantin.  Chest x-ray showed pulmonary edema.  BNP elevated at 4372.  Given a dose of Lasix.  Repleting phosphorus.  Checking urinalysis given dysuria.  8/10: No seizures (last noted 8/9 3AM). Mentation improving - AOx3-4 and to situation; speech quite clear; has occasional trouble findings words. NSX follow up - to discuss with family about possible washout of residual blood. Neurology consult Dr. Hemphill for seizures management - dilantin switched to vimpat given hx of liver transplant.     8/11: Afebrile, vitals wnl, good UO. Still reports of ant headache but residing. Electrolytes K and Mg supplemented overnight. Deemed stable to transfer out of ICU. NSX team planned for IR guided drainage of SDH.     8/12- she tells me that she is doing well. She denies pain. Swelling is improving. She is able to remove all extremities. Pending MMA procedure. Continue to monitor     I have personally seen and examined the patient at bedside. I discussed the plan of care with patient and bedside RN.    Consultants/Specialty  Neurosurgeon Dr. Arnold  Critical care Dr. Flores  Neurology Dr. Hemphill  Discussed with Pulmonologist Dr. Grady  IR for MMA     Code Status  Full Code    Disposition  Patient is not medically cleared. Pending MMA   Anticipate discharge to - home with home  health     Review of Systems  Review of Systems   Constitutional: Positive for malaise/fatigue.   Eyes: Negative for blurred vision and double vision.   Respiratory: Negative for cough and shortness of breath.    Cardiovascular: Negative for chest pain.   Gastrointestinal: Negative for abdominal pain.   Genitourinary: Negative for dysuria.   Musculoskeletal: Negative for back pain, myalgias and neck pain.   Neurological: Negative for dizziness, speech change, focal weakness, seizures (Last 8/8 3 AM) and headaches.        Physical Exam  Temp:  [36 °C (96.8 °F)-36.9 °C (98.5 °F)] 36.9 °C (98.5 °F)  Pulse:  [61-88] 66  Resp:  [8-34] 18  BP: ()/(51-66) 114/64  SpO2:  [96 %-99 %] 97 %    Physical Exam  Constitutional:       General: She is not in acute distress.     Appearance: She is well-developed.   HENT:      Head: Normocephalic.      Nose:      Comments: Cortrak in place     Mouth/Throat:      Mouth: Mucous membranes are dry.   Eyes:      General: No scleral icterus.     Conjunctiva/sclera: Conjunctivae normal.   Cardiovascular:      Rate and Rhythm: Normal rate.      Pulses: Normal pulses.      Heart sounds: No murmur heard.     Pulmonary:      Effort: Pulmonary effort is normal. No respiratory distress.      Breath sounds: No wheezing or rales.   Abdominal:      General: There is no distension.      Palpations: Abdomen is soft.      Tenderness: There is no abdominal tenderness.   Musculoskeletal:         General: Normal range of motion.      Cervical back: Normal range of motion and neck supple.      Right lower leg: Edema (trace) present.      Left lower leg: Edema (trace) present.   Skin:     General: Skin is warm.      Comments: Head staples in place CDI   Neurological:      Mental Status: She is alert.      Comments: AOx3-4 and to situation  Trouble finding words at time  Speech reasonable clear  Grossly intact neuro exam   Psychiatric:         Mood and Affect: Mood normal.          Fluids    Intake/Output Summary (Last 24 hours) at 8/12/2021 1040  Last data filed at 8/11/2021 1400  Gross per 24 hour   Intake 320 ml   Output 400 ml   Net -80 ml       Laboratory  Recent Labs     08/11/21  0400 08/12/21  0319   WBC 4.1* 4.3*   RBC 2.92* 3.05*   HEMOGLOBIN 9.1* 9.2*   HEMATOCRIT 28.5* 29.2*   MCV 97.6 95.7   MCH 31.2 30.2   MCHC 31.9* 31.5*   RDW 56.9* 55.5*   PLATELETCT 155* 184   MPV 9.5 9.6     Recent Labs     08/11/21  0400 08/12/21  0319   SODIUM 138 136   POTASSIUM 3.1* 3.5*   CHLORIDE 103 99   CO2 25 30   GLUCOSE 74 127*   BUN 5* 8   CREATININE 0.33* 0.48*   CALCIUM 7.2* 7.8*     Recent Labs     08/10/21  0920   APTT 30.4   INR 0.98               Imaging  IR-MIDLINE CATHETER INSERTION WO GUIDANCE > AGE 3   Final Result                  Ultrasound-guided midline placement performed by qualified nursing staff    as above.          DX-CHEST-PORTABLE (1 VIEW)   Final Result         1.  Interstitial pulmonary parenchymal prominence suggest chronic underlying lung disease, component of interstitial edema and/or infiltrates not excluded.   2.  Small left pleural effusion   3.  Cardiomegaly   4.  Atherosclerosis      CT-HEAD W/O   Final Result         1.  Left subdural hematoma, stable since prior study.   2.  3.3 mm left right midline shift, similar compared to prior study.   3.  Minimal pneumocephalus, stable to slightly decreased since prior study.   4.  Nonspecific white matter changes, commonly associated with small vessel ischemic disease.  Associated mild cerebral atrophy is noted.   5.  Atherosclerosis.      DX-ABDOMEN FOR TUBE PLACEMENT   Final Result      Feeding tube tip is stable in left abdomen in this patient with gastric bypass surgery      DX-ABDOMEN FOR TUBE PLACEMENT   Final Result      Feeding tube tip projects over the expected location of the proximal small bowel in this patient status post gastric bypass surgery      DX-ABDOMEN FOR TUBE PLACEMENT   Final Result       Enteric tube projects over the stomach.      CT-HEAD W/O   Final Result      1.  Again seen left-sided holohemispheric subdural hemorrhage with mixed density components and gas within overlying craniotomy. This measures 14 mm in depth and is unchanged.      2.  Again seen atrophy.      CT-HEAD W/O   Final Result      1.  Interval increase in size in left frontal subdural hematoma measuring up to 14 mm in width with mass effect and midline shift from left to right of 2 mm.      2.  This was discussed with Physician: MAIDA PRITCHARD at 12:50 AM.      EC-ECHOCARDIOGRAM COMPLETE W/O CONT   Final Result      CT-HEAD W/O   Final Result      1.  Status post left frontal temporal craniotomy with evacuation of left subdural hematoma. Minimal residual hemorrhage and air is present in the left subdural space.      2.  No residual left to right midline shift.      3.  Underlying atrophy and small vessel ischemic changes again noted.      CT-HEAD W/O   Final Result      1.  Stable appearance of left frontal subdural hematoma with residual 7.5 mm left-to-right midline shift.      2.  No new hemorrhage.      3.  No herniation.      DX-CHEST-PORTABLE (1 VIEW)   Final Result      1.  Left basilar opacity may represent atelectasis or consolidation.   2.  Small left pleural effusion may be present.   3.  Atherosclerotic plaque.      MR-BRAIN-W/O   Final Result   Addendum 1 of 1   Findings were discussed with ROBERT BRYANT on 8/3/2021 1:44 PM.      Final      1.  There is subacute/chronic subdural and subarachnoid hemorrhages adjacent to the left frontal and temporal lobes. The maximum transverse dimension measures an approximately 29 mm. There is an approximately 6 mm midline shift towards right side.   2.  Mild cerebral volume loss.   3.  Mild chronic microvascular ischemic disease.      CT-HEAD W/O   Final Result      23 mm thick mixed density left frontotemporal convexity subdural hematoma with mass effect including 8 mm  rightward midline shift/subfalcine herniation.      These findings were discussed with ROBERT BRYANT on 8/3/2021 9:01 AM.      DX-ABDOMEN COMPLETE WITH AP OR PA CXR   Final Result      1.  Small left pleural effusion.   2.  Moderate amount of colonic stool.   3.  No evidence of bowel obstruction.   4.  Scoliosis.      IR-EMBOLIZE-NEURO-INTRACRANIAL    (Results Pending)        Assessment/Plan  * Traumatic subdural hematoma without loss of consciousness (HCC)- (present on admission)  Assessment & Plan  Secondary to GLF 5 weeks ago.   MRI 8/3: 29 mm L SDH w/ 6 mm L->R shift.   8/4 L craniotomy with subgaleal drain  Neurochecks  levetiracetam for seizure ppx, hold dvt ppx.  Drain removed by neurosurgery   On 8/7 developed worsening SDH confirmed on imaging along with slurred speech and then aphasia  Transferred back to ICU 8/7 for close monitoring    NSX follow up - to discuss with family about further management; IR procedure planned for 8/12.   8/12-  Pending MMA procedure. Continue to monitor     Seizures (HCC)- (present on admission)  Assessment & Plan  8/8 - 8/9 3AM with multiple seizures episodes  ?cortical irritation from SDH    In addition to keppra, dilatin added  Today 8/11 - now essentially seizures free for about 48hrs    Neurology consult Dr. Hemphill requested to help with seizures management - dilantin switched to vimpat given hx of liver transplant.   8/12- remain seizure free and stable. 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   Echo 8/5 unremarkable and unchanged from September 2018. RSVP 35 mmHg  Previously on home ambrisentan and tadalafil.  Unable to get above meds due to cortrak  Consulted pulmonologist Dr. Ysabel Elizondo with liquid sildenafil for now per pulm    Considered bosentan 62.5 but patient has liver transplant so will hold off to avoid hepatotoxicity    Monitor patient's oxygen requirements and fluid status.  If patient becomes more hypoxic, check BNP as well as chest x-ray.  If those are worsened with, start diuresis and re-check echo.  If PA pressure greater than 45 on repeat echo, notify pulmonology.    Gentle diuresis as BP tolerates    Chronic respiratory failure with hypoxia (HCC)- (present on admission)  Assessment & Plan  CXR showed L basilar opacity likely atelectasis. IS.   Home oxygen arrangement     Acquired hypothyroidism- (present on admission)  Assessment & Plan  Continue levothyroxine    Status post liver transplant Dr. Canada ([facility])- (present on admission)  Overview  -December 2011: Status post liver transplant for end stage liver disease at [facility] by Dr. Canada.        Assessment & Plan  For alcoholic cirrhosis  Continue home tacrolimus and mycophenolate       VTE prophylaxis: SCDs/TEDs    I have performed a physical exam and reviewed and updated ROS and Plan today (8/12/2021).

## 2021-08-12 NOTE — THERAPY
Missed Therapy     Patient Name: Roxana Cuba  Age:  61 y.o., Sex:  female  Medical Record #: [id number]  Today's Date: 8/12/2021    Per EMR, pt is NPO for procedure this date. SLP to hold dysphagia intervention and return as pt medically appropriate.

## 2021-08-12 NOTE — OR NURSING
1550-AAO x 4  Equal strength UE and LE. Follows commands. Taking sips water well. Right groin femoral access site with angio seal CDI. Groin soft, no discoloration. Right anterior dorsalis pedis pulse palpable.    1600-Right groin femoral access site with angio seal CDI. Groin soft, no discoloration. Right anterior dorsalis pedis pulse palpable    1615-Right groin femoral access site with angio seal CDI. Groin soft, no discoloration. Right anterior dorsalis pedis pulse palpable      1630-Right groin femoral access site with angio seal CDI. Groin soft, no discoloration. Right anterior dorsalis pedis pulse palpable

## 2021-08-12 NOTE — CARE PLAN
The patient is Watcher - Medium risk of patient condition declining or worsening    Shift Goals  Clinical Goals: Neurostability  Patient Goals: Speak more fluently  Family Goals: Comfort, return to baseline    Progress made toward(s) clinical / shift goals:  Patient maintained neurological status. Provided hourly rounding to assess patients needs. Provided distraction.     Patient is not progressing towards the following goals:

## 2021-08-12 NOTE — PROGRESS NOTES
IR Nursing Note      Cerebral angiogram with left middle meningeal artery embolization and possible interention by MD Gonzalez assisted by RT González Brice RT. Right femoral  access site.    Patient is under general anesthesia with Dr Centeno, Dr Centeno monitoring airway, vital signs and medications. See anesthesia records for details.     HydroSoft 3D, 1.5mm x 4 cm, REF [id number], LOT [id number], EXP 04/30/26, deployed at 1445 left MMA.    HydroSoft 3D, 1.5mm x 4 cm, REF [id number], LOT [id number], EXP 04/30/26, deployed at  1454   Left  MMA.    HydroSoft 3D, 1 mm x 3 cm, REF [id number], LOT [id number], deployed at 1510   Right MMA .    Access site sealed with 6F angio-seal, covered with gauze and tegaderm.    Report given to PENNY Omalley.  Patient transported to PACU via IR RN monitored then transferred care to report RN.    Angio-Seal VIP 6F  REF [id number], Lot [id number], EXP 04/30/22.

## 2021-08-12 NOTE — ANESTHESIA PROCEDURE NOTES
Airway    Date/Time: 8/12/2021 2:04 PM  Performed by: Dami Centeno D.O.  Authorized by: Dami Centeno D.O.     Location:  OR  Urgency:  Elective  Indications for Airway Management:  Anesthesia      Spontaneous Ventilation: absent    Sedation Level:  Deep  Preoxygenated: Yes    Patient Position:  Sniffing  Final Airway Type:  Endotracheal airway  Final Endotracheal Airway:  ETT  Cuffed: Yes    Technique Used for Successful ETT Placement:  Direct laryngoscopy    Insertion Site:  Oral  Blade Type:  Mariza  Laryngoscope Blade/Videolaryngoscope Blade Size:  3  ETT Size (mm):  7.0  Measured from:  Teeth  ETT to Teeth (cm):  21  Placement Verified by: auscultation and capnometry    Cormack-Lehane Classification:  Grade I - full view of glottis  Number of Attempts at Approach:  1

## 2021-08-12 NOTE — OR SURGEON
Immediate Post- Operative Note        PostOp Diagnosis:Left SDH      Procedure(s): B/L MMA embolization      Estimated Blood Loss: Less than 5 ml        Complications: None            8/12/2021     3:21 PM     Brandon Gonzalez M.D.

## 2021-08-13 ENCOUNTER — APPOINTMENT (OUTPATIENT)
Dept: RADIOLOGY | Facility: MEDICAL CENTER | Age: 61
DRG: 026 | End: 2021-08-13
Attending: STUDENT IN AN ORGANIZED HEALTH CARE EDUCATION/TRAINING PROGRAM
Payer: MEDICARE

## 2021-08-13 ENCOUNTER — HOME HEALTH ADMISSION (OUTPATIENT)
Dept: HOME HEALTH SERVICES | Facility: HOME HEALTHCARE | Age: 61
End: 2021-08-13
Payer: MEDICARE

## 2021-08-13 LAB
ALBUMIN SERPL BCP-MCNC: 2.3 G/DL (ref 3.2–4.9)
ALBUMIN/GLOB SERPL: 1.1 G/DL
ALP SERPL-CCNC: 76 U/L (ref 30–99)
ALT SERPL-CCNC: 9 U/L (ref 2–50)
ANION GAP SERPL CALC-SCNC: 7 MMOL/L (ref 7–16)
AST SERPL-CCNC: 9 U/L (ref 12–45)
BASOPHILS # BLD AUTO: 0.5 % (ref 0–1.8)
BASOPHILS # BLD: 0.02 K/UL (ref 0–0.12)
BILIRUB SERPL-MCNC: 0.2 MG/DL (ref 0.1–1.5)
BUN SERPL-MCNC: 8 MG/DL (ref 8–22)
CALCIUM SERPL-MCNC: 7.4 MG/DL (ref 8.5–10.5)
CHLORIDE SERPL-SCNC: 100 MMOL/L (ref 96–112)
CO2 SERPL-SCNC: 28 MMOL/L (ref 20–33)
CREAT SERPL-MCNC: 0.55 MG/DL (ref 0.5–1.4)
EOSINOPHIL # BLD AUTO: 0.06 K/UL (ref 0–0.51)
EOSINOPHIL NFR BLD: 1.5 % (ref 0–6.9)
ERYTHROCYTE [DISTWIDTH] IN BLOOD BY AUTOMATED COUNT: 57.4 FL (ref 35.9–50)
GLOBULIN SER CALC-MCNC: 2.1 G/DL (ref 1.9–3.5)
GLUCOSE SERPL-MCNC: 291 MG/DL (ref 65–99)
HCT VFR BLD AUTO: 25.6 % (ref 37–47)
HGB BLD-MCNC: 8.2 G/DL (ref 12–16)
IMM GRANULOCYTES # BLD AUTO: 0.02 K/UL (ref 0–0.11)
IMM GRANULOCYTES NFR BLD AUTO: 0.5 % (ref 0–0.9)
LYMPHOCYTES # BLD AUTO: 0.76 K/UL (ref 1–4.8)
LYMPHOCYTES NFR BLD: 18.6 % (ref 22–41)
MCH RBC QN AUTO: 30.9 PG (ref 27–33)
MCHC RBC AUTO-ENTMCNC: 32 G/DL (ref 33.6–35)
MCV RBC AUTO: 96.6 FL (ref 81.4–97.8)
MONOCYTES # BLD AUTO: 0.43 K/UL (ref 0–0.85)
MONOCYTES NFR BLD AUTO: 10.5 % (ref 0–13.4)
NEUTROPHILS # BLD AUTO: 2.79 K/UL (ref 2–7.15)
NEUTROPHILS NFR BLD: 68.4 % (ref 44–72)
NRBC # BLD AUTO: 0 K/UL
NRBC BLD-RTO: 0 /100 WBC
PLATELET # BLD AUTO: 195 K/UL (ref 164–446)
PMV BLD AUTO: 9.3 FL (ref 9–12.9)
POTASSIUM SERPL-SCNC: 3.4 MMOL/L (ref 3.6–5.5)
PROT SERPL-MCNC: 4.4 G/DL (ref 6–8.2)
RBC # BLD AUTO: 2.65 M/UL (ref 4.2–5.4)
SODIUM SERPL-SCNC: 135 MMOL/L (ref 135–145)
WBC # BLD AUTO: 4.1 K/UL (ref 4.8–10.8)

## 2021-08-13 PROCEDURE — 700102 HCHG RX REV CODE 250 W/ 637 OVERRIDE(OP): Performed by: HOSPITALIST

## 2021-08-13 PROCEDURE — 99232 SBSQ HOSP IP/OBS MODERATE 35: CPT | Performed by: INTERNAL MEDICINE

## 2021-08-13 PROCEDURE — 85025 COMPLETE CBC W/AUTO DIFF WBC: CPT

## 2021-08-13 PROCEDURE — C9254 INJECTION, LACOSAMIDE: HCPCS | Performed by: PSYCHIATRY & NEUROLOGY

## 2021-08-13 PROCEDURE — 700102 HCHG RX REV CODE 250 W/ 637 OVERRIDE(OP): Performed by: STUDENT IN AN ORGANIZED HEALTH CARE EDUCATION/TRAINING PROGRAM

## 2021-08-13 PROCEDURE — A9270 NON-COVERED ITEM OR SERVICE: HCPCS | Performed by: HOSPITALIST

## 2021-08-13 PROCEDURE — A9270 NON-COVERED ITEM OR SERVICE: HCPCS | Performed by: STUDENT IN AN ORGANIZED HEALTH CARE EDUCATION/TRAINING PROGRAM

## 2021-08-13 PROCEDURE — 97530 THERAPEUTIC ACTIVITIES: CPT | Mod: CO

## 2021-08-13 PROCEDURE — 770006 HCHG ROOM/CARE - MED/SURG/GYN SEMI*

## 2021-08-13 PROCEDURE — 700111 HCHG RX REV CODE 636 W/ 250 OVERRIDE (IP): Performed by: STUDENT IN AN ORGANIZED HEALTH CARE EDUCATION/TRAINING PROGRAM

## 2021-08-13 PROCEDURE — 700105 HCHG RX REV CODE 258: Performed by: PSYCHIATRY & NEUROLOGY

## 2021-08-13 PROCEDURE — 70450 CT HEAD/BRAIN W/O DYE: CPT | Mod: MC

## 2021-08-13 PROCEDURE — 80053 COMPREHEN METABOLIC PANEL: CPT

## 2021-08-13 PROCEDURE — 700111 HCHG RX REV CODE 636 W/ 250 OVERRIDE (IP): Performed by: PSYCHIATRY & NEUROLOGY

## 2021-08-13 PROCEDURE — 92526 ORAL FUNCTION THERAPY: CPT

## 2021-08-13 PROCEDURE — 97535 SELF CARE MNGMENT TRAINING: CPT | Mod: CO

## 2021-08-13 RX ORDER — HYDROMORPHONE HYDROCHLORIDE 1 MG/ML
0.5 INJECTION, SOLUTION INTRAMUSCULAR; INTRAVENOUS; SUBCUTANEOUS
Status: DISCONTINUED | OUTPATIENT
Start: 2021-08-13 | End: 2021-08-17 | Stop reason: HOSPADM

## 2021-08-13 RX ADMIN — OXYCODONE HYDROCHLORIDE 10 MG: 10 TABLET ORAL at 02:25

## 2021-08-13 RX ADMIN — LEVOTHYROXINE SODIUM 137 MCG: 0.03 TABLET ORAL at 06:07

## 2021-08-13 RX ADMIN — SILDENAFIL 20 MG: 20 TABLET ORAL at 20:24

## 2021-08-13 RX ADMIN — OXYCODONE HYDROCHLORIDE 10 MG: 10 TABLET ORAL at 12:30

## 2021-08-13 RX ADMIN — OXYCODONE HYDROCHLORIDE 10 MG: 10 TABLET ORAL at 17:17

## 2021-08-13 RX ADMIN — LEVETIRACETAM 1500 MG: 500 TABLET ORAL at 06:06

## 2021-08-13 RX ADMIN — FLUDROCORTISONE ACETATE 0.5 MG: 0.1 TABLET ORAL at 06:07

## 2021-08-13 RX ADMIN — SILDENAFIL 20 MG: 20 TABLET ORAL at 06:08

## 2021-08-13 RX ADMIN — HYDROMORPHONE HYDROCHLORIDE 0.5 MG: 1 INJECTION, SOLUTION INTRAMUSCULAR; INTRAVENOUS; SUBCUTANEOUS at 07:42

## 2021-08-13 RX ADMIN — PRAVASTATIN SODIUM 20 MG: 20 TABLET ORAL at 06:07

## 2021-08-13 RX ADMIN — MYCOPHENOLATE MOFETIL 250 MG: 200 POWDER, FOR SUSPENSION ORAL at 17:10

## 2021-08-13 RX ADMIN — ACETAMINOPHEN 650 MG: 325 TABLET, FILM COATED ORAL at 22:43

## 2021-08-13 RX ADMIN — MYCOPHENOLATE MOFETIL 250 MG: 200 POWDER, FOR SUSPENSION ORAL at 06:07

## 2021-08-13 RX ADMIN — FUROSEMIDE 20 MG: 40 TABLET ORAL at 06:07

## 2021-08-13 RX ADMIN — OMEPRAZOLE 40 MG: KIT at 06:08

## 2021-08-13 RX ADMIN — LEVETIRACETAM 1500 MG: 500 TABLET ORAL at 17:08

## 2021-08-13 RX ADMIN — Medication 5 MG: at 20:24

## 2021-08-13 RX ADMIN — OXYCODONE HYDROCHLORIDE 10 MG: 10 TABLET ORAL at 23:25

## 2021-08-13 RX ADMIN — SODIUM CHLORIDE 100 MG: 9 INJECTION, SOLUTION INTRAVENOUS at 06:03

## 2021-08-13 RX ADMIN — POLYETHYLENE GLYCOL 3350 1 PACKET: 17 POWDER, FOR SOLUTION ORAL at 06:05

## 2021-08-13 RX ADMIN — GABAPENTIN 100 MG: 100 CAPSULE ORAL at 06:08

## 2021-08-13 RX ADMIN — SODIUM CHLORIDE 100 MG: 9 INJECTION, SOLUTION INTRAVENOUS at 17:09

## 2021-08-13 RX ADMIN — GABAPENTIN 100 MG: 100 CAPSULE ORAL at 17:09

## 2021-08-13 RX ADMIN — DOCUSATE SODIUM 100 MG: 50 LIQUID ORAL at 06:07

## 2021-08-13 RX ADMIN — OXYCODONE HYDROCHLORIDE 10 MG: 10 TABLET ORAL at 06:26

## 2021-08-13 RX ADMIN — TACROLIMUS 4 MG: 5 CAPSULE ORAL at 17:09

## 2021-08-13 RX ADMIN — HYDROMORPHONE HYDROCHLORIDE 0.5 MG: 1 INJECTION, SOLUTION INTRAMUSCULAR; INTRAVENOUS; SUBCUTANEOUS at 04:16

## 2021-08-13 RX ADMIN — OXYCODONE HYDROCHLORIDE 10 MG: 10 TABLET ORAL at 20:24

## 2021-08-13 RX ADMIN — Medication 100 MG: at 06:07

## 2021-08-13 RX ADMIN — DOCUSATE SODIUM 50 MG AND SENNOSIDES 8.6 MG 1 TABLET: 8.6; 5 TABLET, FILM COATED ORAL at 20:23

## 2021-08-13 RX ADMIN — HYDROMORPHONE HYDROCHLORIDE 0.5 MG: 1 INJECTION, SOLUTION INTRAMUSCULAR; INTRAVENOUS; SUBCUTANEOUS at 14:50

## 2021-08-13 RX ADMIN — SILDENAFIL 20 MG: 20 TABLET ORAL at 13:43

## 2021-08-13 RX ADMIN — SERTRALINE HYDROCHLORIDE 100 MG: 100 TABLET ORAL at 06:06

## 2021-08-13 ASSESSMENT — ENCOUNTER SYMPTOMS
SPEECH CHANGE: 0
MYALGIAS: 0
DIZZINESS: 0
BLURRED VISION: 0
NECK PAIN: 0
FEVER: 0
TINGLING: 1
COUGH: 0
HEADACHES: 1
VOMITING: 0
ABDOMINAL PAIN: 0
SEIZURES: 0
WEAKNESS: 0
HEADACHES: 0
SENSORY CHANGE: 0
PALPITATIONS: 0
BACK PAIN: 0
BRUISES/BLEEDS EASILY: 0
HEMOPTYSIS: 0
SHORTNESS OF BREATH: 0
FOCAL WEAKNESS: 0
DOUBLE VISION: 0
CHILLS: 0

## 2021-08-13 ASSESSMENT — COGNITIVE AND FUNCTIONAL STATUS - GENERAL
HELP NEEDED FOR BATHING: A LITTLE
SUGGESTED CMS G CODE MODIFIER DAILY ACTIVITY: CK
DRESSING REGULAR UPPER BODY CLOTHING: A LITTLE
PERSONAL GROOMING: A LITTLE
DRESSING REGULAR LOWER BODY CLOTHING: A LITTLE
EATING MEALS: TOTAL
DAILY ACTIVITIY SCORE: 16
TOILETING: A LITTLE

## 2021-08-13 ASSESSMENT — GAIT ASSESSMENTS: DISTANCE (FEET): 72

## 2021-08-13 NOTE — DISCHARGE PLANNING
[facility] Transitional Care Coordination    Referral from:  Dr. ENE Busch    Insurance Provider on Facesheet: SCP    Potential Rehab Diagnosis:  TBI    Chart review indicates patient may have on going medical management and may have therapy needs to possibly meet inpatient rehab facility criteria with the goal of returning to community.    D/C support: TBD     Physiatry consultation denied per protocol.     TBI.  In light of fresh OT eval, Roxana is no longer requiring 2 of 3 disciplines.  TCC will not follow.  Please reach out to myself @ [phone] with any questions.      Thank you for the referral.

## 2021-08-13 NOTE — PROGRESS NOTES
"Hospital Medicine Daily Progress Note    Date of Service  8/13/2021    Chief Complaint  Roxana Cuba is a 61 y.o. female admitted 8/3/2021 with SDH    Hospital Course  Roxana Cuba is a 61 y.o. female with complex PMHx significant for COPD, chronic hypoxemic respiratory failure on 3L NC, pulmonary HTN, liver transplantation in 2011 for etoh cirrhosis, granulomatous hepatitis/sarcoid, Evon-en-Y gastric bypass 2018, MGUS, CKD, ?adrenal insufficiency, hypothyroidism who presented 8/3/2021 with worsening HA and visual changes x4 weeks after being diagnosed with a SDH secondary to ground level, mechanical fall 5 weeks ago while she was in [facility]. Patient also had laparotomy for \"twisting of the bowel\" in [facility] 5 weeks ago. No prior imaging available, but MRI done here reveals 29 mm subacute/chronic L SDH and SAH adjacent to L frontal/temporal lobes with 6 mm L->R shift. NSG was consulted and brought the patient to the OR and performed left craniotomy for a large subdural hematoma with membrane formation. Subgaleal drain was placed. Patient did have some hypotension in the ICU but this resolved. Electrolytes were repleted. Thus, she was able to be transferred out to the neurosurgical floor. She was preparing to discharge home with home health and oxygen when she developed intermittent confusion and slurred speech; so repeat head CT showed increase in SDH to 14 mm depth and 3.3 mm left to right midline shift.  Patient was transferred back to the ICU per neurosurgery.  On 8/8, patient had recurrent seizures so Dilantin was added.  Her home Lasix was resumed on 8/9. Pt was seen by neurology on 8/10 - has been seizures free for about 24hrs; dilantin switched to vimpat given hx /of liver transplant. She is scheduled for MMA procedure 8/12.  8/11: Afebrile, vitals wnl, good UO. Still reports of ant headache but residing. Electrolytes K and Mg supplemented overnight. Deemed stable to transfer out of ICU. NSX team " planned for IR guided drainage of SDH.       Interval Problem Update  8/5: Internal medicine consulted and transferred out of ICU  8/6: Stable and preparing for discharge.  However, overnight, patient developed intermittent confusion and slurred speech.  Head CT ordered.  8/7: Head CT showed that the SDH had now increased to 14 mm in width.    8/8: With receptive and expressive aphasia.  Unable to perform swallow eval.  Substituting liquid sildenafil for PAH meds.  Discussed with pulmonologist Dr. Grady. Repleting magnesium and started on oral thiamine. Still in restraints.  8/9: Patient is critically ill - Multiple seizures overnight. Started on Dilantin.  Chest x-ray showed pulmonary edema.  BNP elevated at 4372.  Given a dose of Lasix.  Repleting phosphorus.  Checking urinalysis given dysuria.  8/10: No seizures (last noted 8/9 3AM). Mentation improving - AOx3-4 and to situation; speech quite clear; has occasional trouble findings words. NSX follow up - to discuss with family about possible washout of residual blood. Neurology consult Dr. Hemphill for seizures management - dilantin switched to vimpat given hx of liver transplant.     8/11: Afebrile, vitals wnl, good UO. Still reports of ant headache but residing. Electrolytes K and Mg supplemented overnight. Deemed stable to transfer out of ICU. NSX team planned for IR guided drainage of SDH.     8/12- she tells me that she is doing well. She denies pain. Swelling is improving. She is able to remove all extremities. Pending MMA procedure. Continue to monitor     8/13- she did tolerate MMA well.  she did have some headache last night. Stat CT was done . SDH slight larger 2 mm. Continue to monitor neuro-check. arrangement for home health are done per . Also oxygen is arranged. Continue to monitor. If stable will attempt discharge tomorrow     I have personally seen and examined the patient at bedside. I discussed the plan of care with patient and  bedside RN. CM, charge nurse, neurosurgery     Consultants/Specialty  Neurosurgeon Dr. Arnold  Critical care Dr. Flores  Neurology Dr. Hemphill  Discussed with Pulmonologist Dr. Ysabel BATEMAN for MMA     Code Status  Full Code    Disposition  Patient is not medically cleared. Pending MMA   Anticipate discharge to - home with home health     Review of Systems  Review of Systems   Constitutional: Positive for malaise/fatigue.   Eyes: Negative for blurred vision and double vision.   Respiratory: Negative for cough and shortness of breath.    Cardiovascular: Negative for chest pain.   Gastrointestinal: Negative for abdominal pain.   Genitourinary: Negative for dysuria.   Musculoskeletal: Negative for back pain, myalgias and neck pain.   Neurological: Negative for dizziness, speech change, focal weakness, seizures (Last 8/8 3 AM) and headaches.        Physical Exam  Temp:  [36.3 °C (97.4 °F)-37.1 °C (98.7 °F)] 37.1 °C (98.7 °F)  Pulse:  [65-89] 65  Resp:  [12-17] 17  BP: ()/(51-81) 116/64  SpO2:  [96 %-99 %] 99 %    Physical Exam  Constitutional:       General: She is not in acute distress.     Appearance: She is well-developed.   HENT:      Head: Normocephalic.      Nose:      Comments: Cortrak in place     Mouth/Throat:      Mouth: Mucous membranes are dry.   Eyes:      General: No scleral icterus.     Conjunctiva/sclera: Conjunctivae normal.   Cardiovascular:      Rate and Rhythm: Normal rate.      Pulses: Normal pulses.      Heart sounds: No murmur heard.     Pulmonary:      Effort: Pulmonary effort is normal. No respiratory distress.      Breath sounds: No wheezing or rales.   Abdominal:      General: There is no distension.      Palpations: Abdomen is soft.      Tenderness: There is no abdominal tenderness.   Musculoskeletal:         General: Normal range of motion.      Cervical back: Normal range of motion and neck supple.      Right lower leg: Edema (trace) present.      Left lower leg: Edema (trace)  present.   Skin:     General: Skin is warm.      Comments: Head staples in place CDI   Neurological:      Mental Status: She is alert.      Comments: AOx3-4 and to situation  Improving on speech    Psychiatric:         Mood and Affect: Mood normal.         Fluids    Intake/Output Summary (Last 24 hours) at 8/13/2021 1318  Last data filed at 8/12/2021 1730  Gross per 24 hour   Intake 1500 ml   Output 420 ml   Net 1080 ml       Laboratory  Recent Labs     08/11/21  0400 08/12/21 0319 08/13/21 0223   WBC 4.1* 4.3* 4.1*   RBC 2.92* 3.05* 2.65*   HEMOGLOBIN 9.1* 9.2* 8.2*   HEMATOCRIT 28.5* 29.2* 25.6*   MCV 97.6 95.7 96.6   MCH 31.2 30.2 30.9   MCHC 31.9* 31.5* 32.0*   RDW 56.9* 55.5* 57.4*   PLATELETCT 155* 184 195   MPV 9.5 9.6 9.3     Recent Labs     08/11/21  0400 08/12/21 0319 08/13/21 0223   SODIUM 138 136 135   POTASSIUM 3.1* 3.5* 3.4*   CHLORIDE 103 99 100   CO2 25 30 28   GLUCOSE 74 127* 291*   BUN 5* 8 8   CREATININE 0.33* 0.48* 0.55   CALCIUM 7.2* 7.8* 7.4*                   Imaging  CT-HEAD W/O   Final Result         1.  Left frontoparietal subdural hematoma, overall appears stable compared to prior study given differences of technique.   2.  2.8 mm left-to-right midline shift.   3.  Nonspecific white matter changes, commonly associated with small vessel ischemic disease.  Associated mild cerebral atrophy is noted.   4.  Atherosclerosis.      IR-EMBOLIZE-NEURO-INTRACRANIAL   Final Result      1.  Left subdural hemorrhage.   2.  Bilateral middle meningeal artery embolization.         IR-MIDLINE CATHETER INSERTION WO GUIDANCE > AGE 3   Final Result                  Ultrasound-guided midline placement performed by qualified nursing staff    as above.          DX-CHEST-PORTABLE (1 VIEW)   Final Result         1.  Interstitial pulmonary parenchymal prominence suggest chronic underlying lung disease, component of interstitial edema and/or infiltrates not excluded.   2.  Small left pleural effusion   3.   Cardiomegaly   4.  Atherosclerosis      CT-HEAD W/O   Final Result         1.  Left subdural hematoma, stable since prior study.   2.  3.3 mm left right midline shift, similar compared to prior study.   3.  Minimal pneumocephalus, stable to slightly decreased since prior study.   4.  Nonspecific white matter changes, commonly associated with small vessel ischemic disease.  Associated mild cerebral atrophy is noted.   5.  Atherosclerosis.      DX-ABDOMEN FOR TUBE PLACEMENT   Final Result      Feeding tube tip is stable in left abdomen in this patient with gastric bypass surgery      DX-ABDOMEN FOR TUBE PLACEMENT   Final Result      Feeding tube tip projects over the expected location of the proximal small bowel in this patient status post gastric bypass surgery      DX-ABDOMEN FOR TUBE PLACEMENT   Final Result      Enteric tube projects over the stomach.      CT-HEAD W/O   Final Result      1.  Again seen left-sided holohemispheric subdural hemorrhage with mixed density components and gas within overlying craniotomy. This measures 14 mm in depth and is unchanged.      2.  Again seen atrophy.      CT-HEAD W/O   Final Result      1.  Interval increase in size in left frontal subdural hematoma measuring up to 14 mm in width with mass effect and midline shift from left to right of 2 mm.      2.  This was discussed with Physician: MAIDA PRITCHARD at 12:50 AM.      EC-ECHOCARDIOGRAM COMPLETE W/O CONT   Final Result      CT-HEAD W/O   Final Result      1.  Status post left frontal temporal craniotomy with evacuation of left subdural hematoma. Minimal residual hemorrhage and air is present in the left subdural space.      2.  No residual left to right midline shift.      3.  Underlying atrophy and small vessel ischemic changes again noted.      CT-HEAD W/O   Final Result      1.  Stable appearance of left frontal subdural hematoma with residual 7.5 mm left-to-right midline shift.      2.  No new hemorrhage.      3.  No  herniation.      DX-CHEST-PORTABLE (1 VIEW)   Final Result      1.  Left basilar opacity may represent atelectasis or consolidation.   2.  Small left pleural effusion may be present.   3.  Atherosclerotic plaque.      MR-BRAIN-W/O   Final Result   Addendum 1 of 1   Findings were discussed with ROBERT BRYANT on 8/3/2021 1:44 PM.      Final      1.  There is subacute/chronic subdural and subarachnoid hemorrhages adjacent to the left frontal and temporal lobes. The maximum transverse dimension measures an approximately 29 mm. There is an approximately 6 mm midline shift towards right side.   2.  Mild cerebral volume loss.   3.  Mild chronic microvascular ischemic disease.      CT-HEAD W/O   Final Result      23 mm thick mixed density left frontotemporal convexity subdural hematoma with mass effect including 8 mm rightward midline shift/subfalcine herniation.      These findings were discussed with ROBERT BRYANT on 8/3/2021 9:01 AM.      DX-ABDOMEN COMPLETE WITH AP OR PA CXR   Final Result      1.  Small left pleural effusion.   2.  Moderate amount of colonic stool.   3.  No evidence of bowel obstruction.   4.  Scoliosis.           Assessment/Plan  * Traumatic subdural hematoma without loss of consciousness (HCC)- (present on admission)  Assessment & Plan  Secondary to GLF 5 weeks ago.   MRI 8/3: 29 mm L SDH w/ 6 mm L->R shift.   8/4 L craniotomy with subgaleal drain  Neurochecks  levetiracetam for seizure ppx, hold dvt ppx.  Drain removed by neurosurgery   On 8/7 developed worsening SDH confirmed on imaging along with slurred speech and then aphasia  Transferred back to ICU 8/7 for close monitoring    NSX follow up - to discuss with family about further management; IR procedure planned for 8/12.   8/12-  Pending MMA procedure. Continue to monitor     Seizures (HCC)- (present on admission)  Assessment & Plan  8/8 - 8/9 3AM with multiple seizures episodes  ?cortical irritation from SDH    In addition to keppra,  dilatin added  Today 8/11 - now essentially seizures free for about 48hrs    Neurology consult Dr. Hemphill requested to help with seizures management - dilantin switched to vimpat given hx of liver transplant.   8/12- remain seizure free and stable. 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   Echo 8/5 unremarkable and unchanged from September 2018. RSVP 35 mmHg  Previously on home ambrisentan and tadalafil.  Unable to get above meds due to cortrak  Consulted pulmonologist Dr. Grady  Substitute with liquid sildenafil for now per pulm    Considered bosentan 62.5 but patient has liver transplant so will hold off to avoid hepatotoxicity    Monitor patient's oxygen requirements and fluid status.  If patient becomes more hypoxic, check BNP as well as chest x-ray.  If those are worsened with, start diuresis and re-check echo.  If PA pressure greater than 45 on repeat echo, notify pulmonology.    Gentle diuresis as BP tolerates    Chronic respiratory failure with hypoxia (HCC)- (present on admission)  Assessment & Plan  CXR showed L basilar opacity likely atelectasis. IS.   Home oxygen arrangement     Acquired hypothyroidism- (present on admission)  Assessment & Plan  Continue levothyroxine    Status post liver transplant Dr. Canada ([facility])- (present on admission)  Overview  -December 2011: Status post liver transplant for end stage liver  disease at [facility], followed by Dr. Canada.        Assessment & Plan  For alcoholic cirrhosis  Continue home tacrolimus and mycophenolate       VTE prophylaxis: SCDs/TEDs    I have performed a physical exam and reviewed and updated ROS and Plan today (8/13/2021).

## 2021-08-13 NOTE — DISCHARGE PLANNING
Good afternoon and thank you for sending [facility] this referral,  This patient has not seen their PCP Dr. Phillip in over a year and will need a new appointment.  Reaching out to [facility] to see if they will be able to follow patient until then.  This referral has been placed on hold.

## 2021-08-13 NOTE — DISCHARGE PLANNING
Anticipated Discharge Disposition: Home with HH    Action: Pt's case discussed during IDT rounds. Pt having MMA procedure today.   Anticipate home with HH. New referral needed when closer to d/c, per [facility].     Barriers to Discharge: None    Plan: Care coordination will continue to follow and assist with discharge planning

## 2021-08-13 NOTE — THERAPY
Speech Language Pathology  Daily Treatment     Patient Name: Roxana Cuba  Age:  61 y.o., Sex:  female  Medical Record #: [id number]  Today's Date: 8/13/2021     Precautions  Precautions: (P) Fall Risk, Swallow Precautions ( See Comments)  Comments: (P) L crani    Assessment    Pt seen this date for dysphagia intervention with reg/thin lunch trial tray. RN present at start of session and removed Cortrak. Hyolaryngeal elevation palpated as complete, timely initiation of swallow appreciated and vocal quality remained clear. Pt demonstrated functional mastication with no oral residue appreciated. Pt reported mid increase in headache and attributed this pain increase to mastication. Clinician offered downgrade to easy to chew solids but pt declined, endorsing preference for regular. No s/sx of aspiration appreciated with any consistencies consumed.    Recommend diet upgrade to reg/thins with adherence to safe swallow strategies (upright at 90* for PO, slow rate, small bites). Meds as tolerated. SLP following.     Plan    Treatment plan modified to 3 times per week until therapy goals are met for the following treatments:  Dysphagia Training and Patient / Family / Caregiver Education.    Discharge Recommendations: (P) Anticipate that the patient will have no further speech therapy needs after discharge from the hospital    Subjective    Pt alert, followed directions and agreeable to session.      Objective       08/13/21 1257   Dysphagia    Dysphagia X   Positioning / Behavior Modification Modulate Rate or Bite Size;Self Monitoring   Other Treatments reg/thin lunch tray   Diet / Liquid Recommendation Thin (0);Regular (7)   Nutritional Liquid Intake Rating Scale Non thickened beverages   Nutritional Food Intake Rating Scale Total oral diet with no restrictions   Nursing Communication Swallow Precaution Sign Posted at Head of Bed   Skilled Intervention Compensatory Strategies;Verbal Cueing   Recommended Route of  "Medication Administration   Medication Administration  Whole with Liquid Wash   Patient / Family Goals   Patient / Family Goal #1 \"Please\"   Goal #1 Outcome Progressing as expected   Short Term Goals   Short Term Goal # 2 NEW 8/11: Pt will consume a soft and bite size/thin liquid diet with no overt s/sx of aspiration   Goal Outcome # 2  Goal met, new goal aded   Short Term Goal # 2 B  Pt will consume a diet of reg/thins with no s/sx of aspiration and min cues .         "

## 2021-08-13 NOTE — DISCHARGE PLANNING
Current documentation reveals a potential for acute inpatient rehabilitation, pending updated [place] evals & D/C resources/support.  Please consider a PMR referral to assist with discharge planning. Msg placed to Dr. ENE Busch.

## 2021-08-13 NOTE — CARE PLAN
Problem: Nutritional:  Goal: Achieve adequate nutritional intake  Description: Patient will consume >50% of meals  Outcome: Not Met       See Dietetic intern note.  RD following.

## 2021-08-13 NOTE — THERAPY
"Occupational Therapy  Daily Treatment     Patient Name: Roxana Cuba  Age:  61 y.o., Sex:  female  Medical Record #: [id number]  Today's Date: 8/13/2021     Precautions  Precautions: (P) Fall Risk, Swallow Precautions ( See Comments), Nasogastric Tube  Comments: (P) L crani    Assessment    Pt was seen for OT treatment. Pt is motivated in therapy. Pt gave good effort but does required to do activities \"my way\". Pt demos increased problem solving and general endurance. Pt did not recall that today is Friday. Pt did know year and month. Pt stated she has all DME at home as needed. Informed pt that OT recommends her  come in for family training. PAC present at end of session. Pt is refusing to go for further therapy and wants to go home.  Recommending Home Health services focus on OT to evaluate I ADL's for functional processing and cognition with higher level tasks due to L crani. Pt is willing to have HH services. Pt demos supervision for toilet transfers without AD, able to do full toilet hygiene seated base post BM, supervision for clothing management up over hips without use of FWW for support and no LOB. Pt stood at the sink with assist for set up for H/G. Pt unable to unscrew caps for toothpaste and mouthwash asking for assist. Pt able to do task with increased sequencing and thoroughness. Pt did c/o headache post treatment a level 8 pain requesting pain meds. RN informed of pt requesting meds and updated on OT treatment findings. Will continue OT POC.        Plan    Continue current treatment plan.    DC Equipment Recommendations: (P) Tub / Shower Seat, Hand Held Shower  Discharge Recommendations: (P) Recommend home health for continued occupational therapy services    Subjective    \"I need to go poop\". \"I want to wash my hands with \".      Objective       08/13/21 1206   Cognition    Cognition / Consciousness X   Speech/ Communication Expressive Aphasia   Orientation Level Oriented x 4   Level " of Consciousness Alert   Safety Awareness Impulsive   Attention Impaired   Comments pleasant and cooperative. Guarded at times with responses.    Passive ROM Upper Body   Comments WFL   Active ROM Upper Body   Dominant Hand Right   Comments WFL   Strength Upper Body   Comments WFL   Other Treatments   Other Treatments Provided Psychosocial intervention addressed. Discussed DME needs for home, and  to come in for family training. Pt is refusing to go to SNF or rehab . Pt wants to go home. Pt informed PAC of wanting to go home.     Balance   Sitting Balance (Static) Good   Sitting Balance (Dynamic) Fair +   Standing Balance (Static) Fair +   Standing Balance (Dynamic) Fair +   Weight Shift Sitting Good   Weight Shift Standing Fair   Skilled Intervention Verbal Cuing;Compensatory Strategies   Comments standing and ambulating without  AD.    Bed Mobility    Supine to Sit Supervised   Sit to Supine Supervised   Skilled Intervention Verbal Cuing   Comments Pt can be slightly impulsive. Improved initiation to do self care tasks.  .   Activities of Daily Living   Grooming Supervision;Standing  (H/G done standing with indirect supervision .)   Bathing   (discussed seated showers and DME needs )   Upper Body Dressing   (declined to change gown. )   Lower Body Dressing Supervision  (seated EOB )   Toileting Supervision  (seated base  for full toilet hygiene post BM. )   Skilled Intervention Verbal Cuing   Comments Pt is making good progress towards OT goals. I ADL's to be evaluated , family training when  is present.    Functional Mobility   Sit to Stand Supervised   Bed, Chair, Wheelchair Transfer Supervised   Toilet Transfers Supervised   Skilled Intervention Verbal Cuing   Comments without AD.    Activity Tolerance   Comments no c/o pain , fatigue or SOB. No LOB.    Patient / Family Goals   Patient / Family Goal #1 To get better   Goal #1 Outcome Progressing as expected   Short Term Goals   Short Term Goal # 1  Pt will complete toilet transfer with spv by discharge.   Goal Outcome # 1 Progressing as expected   Short Term Goal # 2 Pt will complete toileting with spv by discharge.   Goal Outcome # 2 Progressing as expected   Short Term Goal # 3 Pt will complete standing grooming/hygiene with spv by discharge.   Goal Outcome # 3 Progressing as expected   Anticipated Discharge Equipment and Recommendations   DC Equipment Recommendations Tub / Shower Seat;Hand Held Shower   Discharge Recommendations Recommend home health for continued occupational therapy services   Interdisciplinary Plan of Care Collaboration   IDT Collaboration with  Nursing;Physician Assistant;Certified Nursing Assistant   Collaboration Comments RN/PAC updated on OT treatment findings and recommendations.

## 2021-08-13 NOTE — DISCHARGE PLANNING
Anticipated Discharge Disposition:   Home  Home Health  DME:  Home oxygen    Action:    RN CM spoke to patient and explained home health and home oxygen.  Pt agreeable to home health when medically cleared.  Choice form faxed to [facility] for [facility].  Patient stated she has been in [place] for two years and does not have oxygen in [place].  Choice form faxed to [facility] for [facility].    Barriers to Discharge:    Medical clearance  Home health acceptance  Home oxygen delivery    Plan:    F/U on home health referral and home oxygen referral.

## 2021-08-13 NOTE — PROGRESS NOTES
Pt back to unit from PACU. AAOx4, HOPE, VSS. Groin site soft, dressing CDI. Bilateral pedal pulses present.

## 2021-08-13 NOTE — CARE PLAN
The patient is Watcher - Medium risk of patient condition declining or worsening    Shift Goals  Clinical Goals: safety   Patient Goals: procedure and rest  Family Goals: ADAM    Progress made toward(s) clinical / shift goals:  Pt back from MMA embolization, VSS.     Patient is not progressing towards the following goals:

## 2021-08-13 NOTE — OR NURSING
1730-Family updated by phone.    The pt is awake and oriented. Respirations are regular and easy. Pain is controlled, the pt is comfortable. No neuro deficits noted.Transfer to roomPt transported to floor with oxygen The oxygen tank is greater than 50% full upon inspection..

## 2021-08-13 NOTE — PROGRESS NOTES
"Patient seen Aox4 and resting comfortably in bed.   at bedside.  Plan of care discussed. Will continue q4 hour neuro checks/vitals and continue to work on pain control.  Patient reporting headache. PRN pain medication administered per MAR.  No complaints of numbness/tingling at this time. Patient HOPE without difficulty and is ambulatory up to bathroom with 1 person assist.   Cortrak still in place. Will address in the AM for possible removal.   Last BM charted on 8/9. Miralax and milk of mag offered however, patient declined at this time stating \"I don't want to be up pooping all night.\" Patient agreed to take in the AM.   All needs addressed at this time.  Call light and personal belongings within reach, bed locked and in lowest position, fall precautions in place and hourly rounding performed.   "

## 2021-08-13 NOTE — PROGRESS NOTES
"Radiology Progress Note   Author: BEHZAD Renee Date & Time created: 8/13/2021  7:37 AM   Date of admission  8/3/2021  Note to reader: this note follows the APSO format rather than the historical SOAP format. Assessment and plan located at the top of the note for ease of use.    Chief Complaint  61 y.o. female admitted 8/3/2021 with   Chief Complaint   Patient presents with   • Shortness of Breath     x 2 days. Patient states she has used 3L NC at home for the past 10 years. Patient states she has a \"pulmonary syndrome.\" Patient is 97% on RA. Patient reports her saturation decreased when laying down.    • Skin Lesion     Patient reports sarcoids x 10 years    • Headache     x 3 weeks       HPI  Roxana Cuba is a 61 y.o. female with complex PMHx significant for COPD, chronic hypoxemic respiratory failure on 3L NC, pulmonary HTN, liver transplantation in 2011 for etoh cirrhosis, granulomatous hepatitis/sarcoid, Veon-en-Y gastric bypass 2018, MGUS, CKD, ?adrenal insufficiency, hypothyroidism who presented 8/3/2021 with worsening HA and visual changes x4 weeks after being diagnosed with a SDH secondary to ground level, mechanical fall 5 weeks ago while she was in [facility]. Patient also had laparotomy for \"twisting of the bowel\" in [place] 5 weeks ago. No prior imaging available, but MRI done here reveals 29 mm subacute/chronic L SDH and SAH adjacent to L frontal/temporal lobes with 6 mm L->R shift. NSG was consulted and brought the patient to the OR and performed left craniotomy for a large subdural hematoma with membrane formation. Subgaleal drain was placed.     She was preparing to discharge home with home health and oxygen when she developed intermittent confusion and slurred speech; so repeat head CT showed increase in SDH to 14 mm depth and 3.3 mm left to right midline shift.  Patient was transferred back to the ICU per neurosurgery.     Assessment/Plan  Interval History   Principal Problem:    " Traumatic subdural hematoma without loss of consciousness (HCC)  Active Problems:    Status post liver transplant Dr. [place] ([facility])    Acquired hypothyroidism    Chronic respiratory failure with hypoxia (HCC)    Pulmonary hypertension (HCC)    GERD (gastroesophageal reflux disease)    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HCC)    Eye swelling, left    Dysphagia following nontraumatic intracerebral hemorrhage    Seizures (HCC)      Plan IR  - From a NeuroInterventional Service standpoint, OK to discharge to home when medically cleared by Hospitalist Service.    - Post-angioseal instructions: no lifting greater than 5 lbs and no baths/ swimming/ soaking in tub for 5 days. Shower OK. OK to change dressings/band aid as needed.  - Thank you for allowing Interventional Radiology team to participate in the patients care, if any additonal care or requests are needed in the future please do not hesitate call or place IR order      [id number]  IR:   8/12- Left SDH  Procedure(s): B/L MMA embolization  8/13- Today, the patient complains of slight headache in left frontal region. SHe has eaten and denies nausea. Denies vision changes. Denies worsening of chronic motor and sensory deficits.    oriented x 4. Face symmetric, speech fluent.   Answers questions appropriately.   Bilat UE  5/5; pedal dorsiflexion/ plantar flexion RLE 5/5, LLE 5/5.   Right femoral angio site WNL without ecchymosis, oozing, or hematoma.   Distal pulses 2+, skin warm, cap refill <2 sec.           Review of Systems  Physical Exam   Review of Systems   Constitutional: Negative for chills and fever.   HENT: Negative for hearing loss.    Eyes: Negative for blurred vision.   Respiratory: Negative for cough, hemoptysis and shortness of breath.    Cardiovascular: Negative for chest pain and palpitations.   Gastrointestinal: Negative for abdominal pain and vomiting.   Genitourinary: Negative for dysuria.   Musculoskeletal: Negative for myalgias.   Skin: Negative for rash.    Neurological: Positive for tingling and headaches. Negative for dizziness, sensory change and weakness.   Endo/Heme/Allergies: Does not bruise/bleed easily.      Vitals:    08/13/21 0306   BP: 115/63   Pulse: 66   Resp: 16   Temp: 36.8 °C (98.2 °F)   SpO2: 98%      Physical Exam  Constitutional:       Appearance: Normal appearance.   HENT:      Head: Normocephalic.      Comments: [place]  NG     Nose: Nose normal.      Mouth/Throat:      Mouth: Mucous membranes are moist.   Eyes:      General: No visual field deficit.     Pupils: Pupils are equal, round, and reactive to light.   Cardiovascular:      Rate and Rhythm: Normal rate.   Pulmonary:      Effort: Pulmonary effort is normal. No respiratory distress.   Abdominal:      General: Abdomen is flat.      Tenderness: There is no abdominal tenderness.   Musculoskeletal:         General: No tenderness or deformity.      Cervical back: Normal range of motion.   Skin:     General: Skin is warm and dry.      Capillary Refill: Capillary refill takes less than 2 seconds.      Coloration: Skin is not jaundiced or pale.   Neurological:      General: No focal deficit present.      Mental Status: She is alert and oriented to person, place, and time.      Cranial Nerves: No cranial nerve deficit or facial asymmetry.      Sensory: Sensation is intact. No sensory deficit.      Motor: Motor function is intact. No weakness.      Coordination: Coordination is intact. Romberg sign negative.      Comments: has occasional trouble findings words.    Psychiatric:         Mood and Affect: Mood normal.         Behavior: Behavior normal.             Labs    Recent Labs     08/11/21  0400 08/12/21  0319 08/13/21  0223   WBC 4.1* 4.3* 4.1*   RBC 2.92* 3.05* 2.65*   HEMOGLOBIN 9.1* 9.2* 8.2*   HEMATOCRIT 28.5* 29.2* 25.6*   MCV 97.6 95.7 96.6   MCH 31.2 30.2 30.9   MCHC 31.9* 31.5* 32.0*   RDW 56.9* 55.5* 57.4*   PLATELETCT 155* 184 195   MPV 9.5 9.6 9.3     Recent Labs     08/11/21  0400  08/12/21  0319 08/13/21  0223   SODIUM 138 136 135   POTASSIUM 3.1* 3.5* 3.4*   CHLORIDE 103 99 100   CO2 25 30 28   GLUCOSE 74 127* 291*   BUN 5* 8 8   CREATININE 0.33* 0.48* 0.55   CALCIUM 7.2* 7.8* 7.4*     CT-HEAD W/O   Final Result         1.  Left frontoparietal subdural hematoma, overall appears stable compared to prior study given differences of technique.   2.  2.8 mm left-to-right midline shift.   3.  Nonspecific white matter changes, commonly associated with small vessel ischemic disease.  Associated mild cerebral atrophy is noted.   4.  Atherosclerosis.      IR-EMBOLIZE-NEURO-INTRACRANIAL   Final Result      1.  Left subdural hemorrhage.   2.  Bilateral middle meningeal artery embolization.         IR-MIDLINE CATHETER INSERTION WO GUIDANCE > AGE 3   Final Result                  Ultrasound-guided midline placement performed by qualified nursing staff    as above.          DX-CHEST-PORTABLE (1 VIEW)   Final Result         1.  Interstitial pulmonary parenchymal prominence suggest chronic underlying lung disease, component of interstitial edema and/or infiltrates not excluded.   2.  Small left pleural effusion   3.  Cardiomegaly   4.  Atherosclerosis      CT-HEAD W/O   Final Result         1.  Left subdural hematoma, stable since prior study.   2.  3.3 mm left right midline shift, similar compared to prior study.   3.  Minimal pneumocephalus, stable to slightly decreased since prior study.   4.  Nonspecific white matter changes, commonly associated with small vessel ischemic disease.  Associated mild cerebral atrophy is noted.   5.  Atherosclerosis.      DX-ABDOMEN FOR TUBE PLACEMENT   Final Result      Feeding tube tip is stable in left abdomen in this patient with gastric bypass surgery      DX-ABDOMEN FOR TUBE PLACEMENT   Final Result      Feeding tube tip projects over the expected location of the proximal small bowel in this patient status post gastric bypass surgery      DX-ABDOMEN FOR TUBE PLACEMENT    Final Result      Enteric tube projects over the stomach.      CT-HEAD W/O   Final Result      1.  Again seen left-sided holohemispheric subdural hemorrhage with mixed density components and gas within overlying craniotomy. This measures 14 mm in depth and is unchanged.      2.  Again seen atrophy.      CT-HEAD W/O   Final Result      1.  Interval increase in size in left frontal subdural hematoma measuring up to 14 mm in width with mass effect and midline shift from left to right of 2 mm.      2.  This was discussed with Physician: MAIDA PRITCHARD at 12:50 AM.      EC-ECHOCARDIOGRAM COMPLETE W/O CONT   Final Result      CT-HEAD W/O   Final Result      1.  Status post left frontal temporal craniotomy with evacuation of left subdural hematoma. Minimal residual hemorrhage and air is present in the left subdural space.      2.  No residual left to right midline shift.      3.  Underlying atrophy and small vessel ischemic changes again noted.      CT-HEAD W/O   Final Result      1.  Stable appearance of left frontal subdural hematoma with residual 7.5 mm left-to-right midline shift.      2.  No new hemorrhage.      3.  No herniation.      DX-CHEST-PORTABLE (1 VIEW)   Final Result      1.  Left basilar opacity may represent atelectasis or consolidation.   2.  Small left pleural effusion may be present.   3.  Atherosclerotic plaque.      MR-BRAIN-W/O   Final Result   Addendum 1 of 1   Findings were discussed with ROBERT BRYANT on 8/3/2021 1:44 PM.      Final      1.  There is subacute/chronic subdural and subarachnoid hemorrhages adjacent to the left frontal and temporal lobes. The maximum transverse dimension measures an approximately 29 mm. There is an approximately 6 mm midline shift towards right side.   2.  Mild cerebral volume loss.   3.  Mild chronic microvascular ischemic disease.      CT-HEAD W/O   Final Result      23 mm thick mixed density left frontotemporal convexity subdural hematoma with mass effect  including 8 mm rightward midline shift/subfalcine herniation.      These findings were discussed with ROBERT BRYANT on 8/3/2021 9:01 AM.      DX-ABDOMEN COMPLETE WITH AP OR PA CXR   Final Result      1.  Small left pleural effusion.   2.  Moderate amount of colonic stool.   3.  No evidence of bowel obstruction.   4.  Scoliosis.        Recent Labs     08/11/21  0400 08/12/21  0319 08/13/21  0223   SODIUM 138 136 135   POTASSIUM 3.1* 3.5* 3.4*   CHLORIDE 103 99 100   CO2 25 30 28   GLUCOSE 74 127* 291*   BUN 5* 8 8     INR   Date Value Ref Range Status   08/10/2021 0.98 0.87 - 1.13 Final     Comment:     INR - Non-therapeutic Reference Range: 0.87-1.13  INR - Therapeutic Reference Range: 2.0-4.0       No results found for: POCINR     Intake/Output Summary (Last 24 hours) at 8/13/2021 0737  Last data filed at 8/12/2021 1730  Gross per 24 hour   Intake 1530 ml   Output 420 ml   Net 1110 ml      Labs not explicitly included in this progress note were reviewed by the author. Radiology/imaging not explicitly included in this progress note was reviewed by the author.     I have performed a physical exam and reviewed and updated ROS and Plan today (8/13/2021).     25 minutes in directly providing and coordinating care and extensive data review.  No time overlap and excludes procedures.

## 2021-08-13 NOTE — OR NURSING
1645-The right groin puncture site with angio seal and dressing CDI. The right groin is soft, no discoloration. The right dorsalis pedis pulse is palpable.    1700-The right groin puncture site with angio seal and dressing CDI. The right groin is soft, no discoloration. The right dorsalis pedis pulse is palpable.    1730-The right groin puncture site with angio seal and dressing CDI. The right groin is soft, no discoloration. The right dorsalis pedis pulse is palpable.

## 2021-08-13 NOTE — CARE PLAN
The patient is Watcher - Medium risk of patient condition declining or worsening    Shift Goals  Clinical Goals: pain control  Patient Goals: comfort  Family Goals: sarah    Progress made toward(s) clinical / shift goals: pain assessed periodically, prn pain med, rest and reposition implemented    Patient is not progressing towards the following goals:

## 2021-08-13 NOTE — PROGRESS NOTES
4 Eyes Skin Assessment Completed by PENNY Swift and PENNY Rosenberg.    Head Crani site with staples  Ears WDL  Nose WDL  Mouth WDL  Neck WDL  Breast/Chest WDL  Shoulder Blades WDL  Spine WDL  (R) Arm/Elbow/Hand WDL  (L) Arm/Elbow/Hand WDL  Abdomen WDL  Groin Incision  Scrotum/Coccyx/Buttocks WDL  (R) Leg WDL  (L) Leg WDL  (R) Heel/Foot/Toe WDL  (L) Heel/Foot/Toe WDL          Devices In Places Pulse Ox, SCD's, Cortrak and Nasal Cannula      Interventions In Place Gray Ear Foams and Pillows    Possible Skin Injury No    Pictures Uploaded Into Epic N/A  Wound Consult Placed N/A  RN Wound Prevention Protocol Ordered No

## 2021-08-13 NOTE — PROGRESS NOTES
Neurosurgery Progress Note    Subjective:  S/p fall with left SDH.  POD#9 left craniotomy for SDH  Seizure activity overnight 8/. Keppra and dilantin on board.   Neurology consulted and Dr. Hemphill following     Mentation improving - AOx3-4 and to situation; speech quite clear; has occasional trouble findings words.   MMA embolization yesterday, doing well       Exam:  Awake and alert  Alert and oriented x 3  EOMI, PERRL.  Moves extremities x4.  Slight R pronator drift   Incision intact  Has some edema to left eye as expected.     BP  Min: 96/53  Max: 137/81  Pulse  Av.3  Min: 65  Max: 89  Resp  Avg: 15.2  Min: 12  Max: 17  Temp  Av.7 °C (98.1 °F)  Min: 36.3 °C (97.4 °F)  Max: 37.1 °C (98.7 °F)  SpO2  Av %  Min: 96 %  Max: 99 %    No data recorded    Recent Labs     21   WBC 4.1* 4.3* 4.1*   RBC 2.92* 3.05* 2.65*   HEMOGLOBIN 9.1* 9.2* 8.2*   HEMATOCRIT 28.5* 29.2* 25.6*   MCV 97.6 95.7 96.6   MCH 31.2 30.2 30.9   MCHC 31.9* 31.5* 32.0*   RDW 56.9* 55.5* 57.4*   PLATELETCT 155* 184 195   MPV 9.5 9.6 9.3     Recent Labs     21   SODIUM 138 136 135   POTASSIUM 3.1* 3.5* 3.4*   CHLORIDE 103 99 100   CO2 25 30 28   GLUCOSE 74 127* 291*   BUN 5* 8 8   CREATININE 0.33* 0.48* 0.55   CALCIUM 7.2* 7.8* 7.4*               Intake/Output                       21 - 2159 21 - 2159      Total  Total                 Intake    P.O.  300  -- 300  --  -- --    P.O. 300 -- 300 -- -- --    I.V.  300  -- 300  --  -- --    Volume (mL) (lactated ringers infusion) 300 -- 300 -- -- --    IV Piggyback  900  -- 900  --  -- --    Volume (mL) (lacosamide (VIMPAT) 100 mg in  mL ivpb) 900 -- 900 -- -- --    Enteral  30  -- 30  --  -- --    Free Water / Tube Flush 30 -- 30 -- -- --    Total Intake 1530 -- 1530 -- -- --       Output    Urine  400  -- 400  --  -- --     Number of Times Voided -- 4 x 4 x 1 x -- 1 x    Output (mL) (External Urinary Catheter (Female Wick)) 400 -- 400 -- -- --    Blood  20  -- 20  --  -- --    Est. Blood Loss 20 -- 20 -- -- --    Total Output 420 -- 420 -- -- --       Net I/O     1110 -- 1110 -- -- --            Intake/Output Summary (Last 24 hours) at 8/13/2021 1211  Last data filed at 8/12/2021 1730  Gross per 24 hour   Intake 1500 ml   Output 420 ml   Net 1080 ml            • HYDROmorphone  0.5 mg Q3HRS PRN   • levETIRAcetam  1,500 mg BID   • lacosamide (VIMPAT) ivpb  100 mg Q12HRS   • furosemide  20 mg DAILY   • artificial tears  1 Application PRN   • thiamine  100 mg DAILY   • sildenafil  20 mg TID   • LORazepam  2 mg Q10 MIN PRN   • Pharmacy  1 Each PHARMACY TO DOSE   • butalbital/apap/caffeine -40 mg  1 Tablet Q6HRS PRN   • oxyCODONE immediate-release  5 mg Q3HRS PRN    Or   • oxyCODONE immediate-release  10 mg Q3HRS PRN   • melatonin  5 mg Nightly   • sertraline  100 mg DAILY   • senna-docusate  1 Tablet Nightly   • senna-docusate  1 Tablet Q24HRS PRN   • polyethylene glycol/lytes  1 Packet DAILY   • acetaminophen  650 mg Q6HRS PRN   • pravastatin  20 mg DAILY   • [Held by provider] ambrisentan  10 mg DAILY   • cloNIDine  0.1 mg Q4HRS PRN   • gabapentin  100 mg BID   • levothyroxine  137 mcg AM ES   • fludrocortisone  0.5 mg DAILY   • diphenhydrAMINE  25 mg Q6HRS PRN    Or   • diphenhydrAMINE  25 mg Q6HRS PRN   • magnesium hydroxide  30 mL QDAY PRN   • ALPRAZolam  0.5 mg TID PRN   • mycophenolate  250 mg BID   • tacrolimus  4 mg DAILY   • docusate sodium  100 mg BID   • omeprazole  40 mg DAILY   • Pharmacy Consult Request  1 Each PHARMACY TO DOSE   • MD ALERT...DO NOT ADMINISTER NSAIDS or ASPIRIN unless ORDERED By Neurosurgery  1 Each PRN   • ondansetron  4 mg Q4HRS PRN   • diphenhydrAMINE  25 mg Q6HRS PRN   • labetalol  10 mg Q HOUR PRN   • hydrALAZINE  10 mg Q HOUR PRN   • bisacodyl  10 mg Q24HRS PRN   • fleet  1 Each Once PRN   •  benzocaine-menthol  1 Lozenge Q2HRS PRN   • [Held by provider] tadalafil  20 mg QHS       Assessment and Plan:  POD #9  Improving mentation  Seizures have stabilized at this point  Repeat head CT with larger left SDH with only 2mm MLS  Q4 hour neuro checks OK   MMA embolization complete  Regular diet, PT/OT  Home health consult recommended, would likely be OK to go tomorrow if HH can be arranged    Prophylactic anticoagulation: no         Start date/time: two weeks.

## 2021-08-13 NOTE — DISCHARGE PLANNING
Received Choice form at [phone]  Agency/Facility Name: [facility]  Referral sent per Choice form @ [phone]

## 2021-08-13 NOTE — PROGRESS NOTES
Patient reporting 10/10 headache near left temporal lobe. 10 mg of PO oxycodone administered at 0225 however, patient has had no relief. MD paged for update.

## 2021-08-13 NOTE — DISCHARGE PLANNING
Received Choice form at [phone]  Agency/Facility Name: [facility]  Referral sent per Choice form @ [phone]

## 2021-08-13 NOTE — DIETARY
Nutrition Services: Update   Day 10 of admit.  Roxana Cuba is a 61 y.o. female with admitting DX of SDH (subdural hematoma)     Pt is currently on Regular diet. Wt 66.8 kg (147 lb 4.3 oz) via bed scale    Pt is no longer on TF and had the tube removed. Pt is now on a regular diet. Per flowsheets: Breakfast <25%. Pt stated appetite was not doing well and would like some more variety to her meals. Pt likes grilled cheese sandwiches and egg salad. Nutrition rep to review menu with Pt.    Malnutrition Risk: n/a    Recommendations/Plan:  1. Encourage intake of meals  2. Document intake of all meals as % taken in ADL's to provide interdisciplinary communication across all shifts.   3. Monitor weight.  4. Nutrition rep will continue to see patient for ongoing meal and snack preferences.    RD following

## 2021-08-13 NOTE — CARE PLAN
The patient is Stable - Low risk of patient condition declining or worsening    Shift Goals  Clinical Goals: Monitor neurovascular status   Patient Goals: Pain control and sleep   Family Goals: ADAM    Progress made toward(s) clinical / shift goals:     Patient is not progressing towards the following goals:    Problem: Neurovascular Monitoring  Goal: Patient's neurovascular status will be maintained or improve  Outcome: Not Met  Note:   Q4 neuro checks in place. Groin site assessed along with pedal pulses and capillary refill.       Problem: Fall Risk  Goal: Patient will remain free from falls  Outcome: Progressing  Note: Patient slightly impulsive at times. Educated patient on fall risk. Assured all fall/safety precautions in place. Bed alarm on and functioning properly.

## 2021-08-13 NOTE — PROGRESS NOTES
Dr. Nixon updated regarding patient's 10/10 pain. Orders received for 0.5 mg of IV Dilaudid and STAT head CT. Patient taken down to CT. Now back in room. Patient medicated for pain per MAR. Will continue to monitor.

## 2021-08-14 LAB
ALBUMIN SERPL BCP-MCNC: 2.5 G/DL (ref 3.2–4.9)
ALBUMIN/GLOB SERPL: 1.2 G/DL
ALP SERPL-CCNC: 75 U/L (ref 30–99)
ALT SERPL-CCNC: 8 U/L (ref 2–50)
ANION GAP SERPL CALC-SCNC: 6 MMOL/L (ref 7–16)
AST SERPL-CCNC: 10 U/L (ref 12–45)
BASOPHILS # BLD AUTO: 1 % (ref 0–1.8)
BASOPHILS # BLD: 0.04 K/UL (ref 0–0.12)
BILIRUB SERPL-MCNC: 0.2 MG/DL (ref 0.1–1.5)
BUN SERPL-MCNC: 7 MG/DL (ref 8–22)
CALCIUM SERPL-MCNC: 8.2 MG/DL (ref 8.5–10.5)
CHLORIDE SERPL-SCNC: 106 MMOL/L (ref 96–112)
CO2 SERPL-SCNC: 29 MMOL/L (ref 20–33)
CREAT SERPL-MCNC: 0.58 MG/DL (ref 0.5–1.4)
EOSINOPHIL # BLD AUTO: 0.2 K/UL (ref 0–0.51)
EOSINOPHIL NFR BLD: 5 % (ref 0–6.9)
ERYTHROCYTE [DISTWIDTH] IN BLOOD BY AUTOMATED COUNT: 57.4 FL (ref 35.9–50)
GLOBULIN SER CALC-MCNC: 2.1 G/DL (ref 1.9–3.5)
GLUCOSE SERPL-MCNC: 88 MG/DL (ref 65–99)
HCT VFR BLD AUTO: 28.9 % (ref 37–47)
HGB BLD-MCNC: 9.2 G/DL (ref 12–16)
IMM GRANULOCYTES # BLD AUTO: 0.03 K/UL (ref 0–0.11)
IMM GRANULOCYTES NFR BLD AUTO: 0.7 % (ref 0–0.9)
LYMPHOCYTES # BLD AUTO: 1.21 K/UL (ref 1–4.8)
LYMPHOCYTES NFR BLD: 30 % (ref 22–41)
MCH RBC QN AUTO: 31 PG (ref 27–33)
MCHC RBC AUTO-ENTMCNC: 31.8 G/DL (ref 33.6–35)
MCV RBC AUTO: 97.3 FL (ref 81.4–97.8)
MONOCYTES # BLD AUTO: 0.47 K/UL (ref 0–0.85)
MONOCYTES NFR BLD AUTO: 11.7 % (ref 0–13.4)
NEUTROPHILS # BLD AUTO: 2.08 K/UL (ref 2–7.15)
NEUTROPHILS NFR BLD: 51.6 % (ref 44–72)
NRBC # BLD AUTO: 0 K/UL
NRBC BLD-RTO: 0 /100 WBC
PLATELET # BLD AUTO: 253 K/UL (ref 164–446)
PMV BLD AUTO: 9.4 FL (ref 9–12.9)
POTASSIUM SERPL-SCNC: 3.4 MMOL/L (ref 3.6–5.5)
PROT SERPL-MCNC: 4.6 G/DL (ref 6–8.2)
RBC # BLD AUTO: 2.97 M/UL (ref 4.2–5.4)
SODIUM SERPL-SCNC: 141 MMOL/L (ref 135–145)
WBC # BLD AUTO: 4 K/UL (ref 4.8–10.8)

## 2021-08-14 PROCEDURE — 700102 HCHG RX REV CODE 250 W/ 637 OVERRIDE(OP): Performed by: HOSPITALIST

## 2021-08-14 PROCEDURE — 700102 HCHG RX REV CODE 250 W/ 637 OVERRIDE(OP): Performed by: STUDENT IN AN ORGANIZED HEALTH CARE EDUCATION/TRAINING PROGRAM

## 2021-08-14 PROCEDURE — 700111 HCHG RX REV CODE 636 W/ 250 OVERRIDE (IP): Performed by: STUDENT IN AN ORGANIZED HEALTH CARE EDUCATION/TRAINING PROGRAM

## 2021-08-14 PROCEDURE — RXMED WILLOW AMBULATORY MEDICATION CHARGE: Performed by: INTERNAL MEDICINE

## 2021-08-14 PROCEDURE — A9270 NON-COVERED ITEM OR SERVICE: HCPCS | Performed by: STUDENT IN AN ORGANIZED HEALTH CARE EDUCATION/TRAINING PROGRAM

## 2021-08-14 PROCEDURE — 85025 COMPLETE CBC W/AUTO DIFF WBC: CPT

## 2021-08-14 PROCEDURE — 700111 HCHG RX REV CODE 636 W/ 250 OVERRIDE (IP): Performed by: PSYCHIATRY & NEUROLOGY

## 2021-08-14 PROCEDURE — 770006 HCHG ROOM/CARE - MED/SURG/GYN SEMI*

## 2021-08-14 PROCEDURE — 700102 HCHG RX REV CODE 250 W/ 637 OVERRIDE(OP): Performed by: INTERNAL MEDICINE

## 2021-08-14 PROCEDURE — 80053 COMPREHEN METABOLIC PANEL: CPT

## 2021-08-14 PROCEDURE — 36415 COLL VENOUS BLD VENIPUNCTURE: CPT

## 2021-08-14 PROCEDURE — C9254 INJECTION, LACOSAMIDE: HCPCS | Performed by: PSYCHIATRY & NEUROLOGY

## 2021-08-14 PROCEDURE — A9270 NON-COVERED ITEM OR SERVICE: HCPCS | Performed by: HOSPITALIST

## 2021-08-14 PROCEDURE — 700111 HCHG RX REV CODE 636 W/ 250 OVERRIDE (IP): Performed by: INTERNAL MEDICINE

## 2021-08-14 PROCEDURE — A9270 NON-COVERED ITEM OR SERVICE: HCPCS | Performed by: INTERNAL MEDICINE

## 2021-08-14 PROCEDURE — 99231 SBSQ HOSP IP/OBS SF/LOW 25: CPT | Performed by: INTERNAL MEDICINE

## 2021-08-14 PROCEDURE — 700105 HCHG RX REV CODE 258: Performed by: PSYCHIATRY & NEUROLOGY

## 2021-08-14 RX ORDER — LEVETIRACETAM 500 MG/1
1500 TABLET ORAL 2 TIMES DAILY
Status: DISCONTINUED | OUTPATIENT
Start: 2021-08-14 | End: 2021-08-17 | Stop reason: HOSPADM

## 2021-08-14 RX ORDER — TADALAFIL 20 MG/1
20 TABLET ORAL
Qty: 30 TABLET | Refills: 0 | Status: SHIPPED | OUTPATIENT
Start: 2021-08-14 | End: 2021-09-15

## 2021-08-14 RX ORDER — OXYCODONE HYDROCHLORIDE 10 MG/1
10 TABLET ORAL
Status: DISCONTINUED | OUTPATIENT
Start: 2021-08-14 | End: 2021-08-17 | Stop reason: HOSPADM

## 2021-08-14 RX ORDER — LACOSAMIDE 100 MG/1
100 TABLET ORAL 2 TIMES DAILY
Status: DISCONTINUED | OUTPATIENT
Start: 2021-08-14 | End: 2021-08-17 | Stop reason: HOSPADM

## 2021-08-14 RX ORDER — PRAVASTATIN SODIUM 20 MG
20 TABLET ORAL DAILY
Qty: 30 TABLET | Refills: 0 | Status: SHIPPED | OUTPATIENT
Start: 2021-08-14 | End: 2021-10-12 | Stop reason: SDUPTHER

## 2021-08-14 RX ORDER — LACOSAMIDE 100 MG/1
100 TABLET ORAL 2 TIMES DAILY
Qty: 60 TABLET | Refills: 0 | Status: SHIPPED | OUTPATIENT
Start: 2021-08-14 | End: 2021-09-09 | Stop reason: SDUPTHER

## 2021-08-14 RX ORDER — POTASSIUM CHLORIDE 20 MEQ/1
40 TABLET, EXTENDED RELEASE ORAL ONCE
Status: COMPLETED | OUTPATIENT
Start: 2021-08-14 | End: 2021-08-14

## 2021-08-14 RX ORDER — FUROSEMIDE 20 MG/1
20 TABLET ORAL DAILY
Status: DISCONTINUED | OUTPATIENT
Start: 2021-08-15 | End: 2021-08-17 | Stop reason: HOSPADM

## 2021-08-14 RX ORDER — PRAVASTATIN SODIUM 20 MG
20 TABLET ORAL DAILY
Status: DISCONTINUED | OUTPATIENT
Start: 2021-08-15 | End: 2021-08-17 | Stop reason: HOSPADM

## 2021-08-14 RX ORDER — GABAPENTIN 100 MG/1
100 CAPSULE ORAL 2 TIMES DAILY
Status: DISCONTINUED | OUTPATIENT
Start: 2021-08-14 | End: 2021-08-17 | Stop reason: HOSPADM

## 2021-08-14 RX ORDER — DIPHENHYDRAMINE HYDROCHLORIDE 50 MG/ML
25 INJECTION INTRAMUSCULAR; INTRAVENOUS EVERY 6 HOURS PRN
Status: DISCONTINUED | OUTPATIENT
Start: 2021-08-14 | End: 2021-08-17 | Stop reason: HOSPADM

## 2021-08-14 RX ORDER — GABAPENTIN 100 MG/1
100 CAPSULE ORAL 2 TIMES DAILY
Qty: 60 CAPSULE | Refills: 0 | Status: SHIPPED | OUTPATIENT
Start: 2021-08-14 | End: 2021-09-15

## 2021-08-14 RX ORDER — CLONIDINE HYDROCHLORIDE 0.1 MG/1
0.1 TABLET ORAL EVERY 4 HOURS PRN
Status: DISCONTINUED | OUTPATIENT
Start: 2021-08-14 | End: 2021-08-17 | Stop reason: HOSPADM

## 2021-08-14 RX ORDER — POLYETHYLENE GLYCOL 3350 17 G/17G
1 POWDER, FOR SOLUTION ORAL DAILY
Status: DISCONTINUED | OUTPATIENT
Start: 2021-08-15 | End: 2021-08-17 | Stop reason: HOSPADM

## 2021-08-14 RX ORDER — SERTRALINE HYDROCHLORIDE 100 MG/1
100 TABLET, FILM COATED ORAL DAILY
Qty: 30 TABLET | Refills: 0 | Status: SHIPPED | OUTPATIENT
Start: 2021-08-14 | End: 2021-09-09 | Stop reason: SDUPTHER

## 2021-08-14 RX ORDER — OXYCODONE HYDROCHLORIDE 10 MG/1
10 TABLET ORAL EVERY 6 HOURS PRN
Qty: 20 TABLET | Refills: 0 | Status: SHIPPED | OUTPATIENT
Start: 2021-08-14 | End: 2021-08-21

## 2021-08-14 RX ORDER — TACROLIMUS 1 MG/1
4 CAPSULE ORAL DAILY
Status: DISCONTINUED | OUTPATIENT
Start: 2021-08-14 | End: 2021-08-17 | Stop reason: HOSPADM

## 2021-08-14 RX ORDER — OMEPRAZOLE 20 MG/1
20 CAPSULE, DELAYED RELEASE ORAL DAILY
Status: DISCONTINUED | OUTPATIENT
Start: 2021-08-15 | End: 2021-08-17 | Stop reason: HOSPADM

## 2021-08-14 RX ORDER — SILDENAFIL CITRATE 20 MG/1
20 TABLET ORAL 3 TIMES DAILY
Status: DISCONTINUED | OUTPATIENT
Start: 2021-08-14 | End: 2021-08-17 | Stop reason: HOSPADM

## 2021-08-14 RX ORDER — MYCOPHENOLATE MOFETIL 250 MG/1
250 CAPSULE ORAL 2 TIMES DAILY
Status: DISCONTINUED | OUTPATIENT
Start: 2021-08-14 | End: 2021-08-17 | Stop reason: HOSPADM

## 2021-08-14 RX ORDER — AMOXICILLIN 250 MG
1 CAPSULE ORAL NIGHTLY
Status: DISCONTINUED | OUTPATIENT
Start: 2021-08-14 | End: 2021-08-17 | Stop reason: HOSPADM

## 2021-08-14 RX ORDER — OXYCODONE HYDROCHLORIDE 5 MG/1
5 TABLET ORAL
Status: DISCONTINUED | OUTPATIENT
Start: 2021-08-14 | End: 2021-08-17 | Stop reason: HOSPADM

## 2021-08-14 RX ORDER — LEVETIRACETAM 750 MG/1
1500 TABLET ORAL 2 TIMES DAILY
Qty: 120 TABLET | Refills: 0 | Status: SHIPPED | OUTPATIENT
Start: 2021-08-14 | End: 2021-09-09 | Stop reason: SDUPTHER

## 2021-08-14 RX ORDER — DIPHENHYDRAMINE HCL 25 MG
25 TABLET ORAL EVERY 6 HOURS PRN
Status: DISCONTINUED | OUTPATIENT
Start: 2021-08-14 | End: 2021-08-17 | Stop reason: HOSPADM

## 2021-08-14 RX ORDER — SERTRALINE HYDROCHLORIDE 100 MG/1
100 TABLET, FILM COATED ORAL DAILY
Status: DISCONTINUED | OUTPATIENT
Start: 2021-08-15 | End: 2021-08-17 | Stop reason: HOSPADM

## 2021-08-14 RX ORDER — LEVOTHYROXINE SODIUM 137 UG/1
137 TABLET ORAL
Status: DISCONTINUED | OUTPATIENT
Start: 2021-08-15 | End: 2021-08-17 | Stop reason: HOSPADM

## 2021-08-14 RX ORDER — ALPRAZOLAM 0.5 MG/1
0.5 TABLET ORAL 3 TIMES DAILY PRN
Status: DISCONTINUED | OUTPATIENT
Start: 2021-08-14 | End: 2021-08-17 | Stop reason: HOSPADM

## 2021-08-14 RX ORDER — TACROLIMUS 1 MG/1
4 CAPSULE ORAL DAILY
Status: DISCONTINUED | OUTPATIENT
Start: 2021-08-15 | End: 2021-08-14

## 2021-08-14 RX ORDER — FLUDROCORTISONE ACETATE 0.1 MG/1
0.5 TABLET ORAL DAILY
Status: DISCONTINUED | OUTPATIENT
Start: 2021-08-15 | End: 2021-08-17 | Stop reason: HOSPADM

## 2021-08-14 RX ORDER — CHOLECALCIFEROL (VITAMIN D3) 125 MCG
5 CAPSULE ORAL NIGHTLY
Status: DISCONTINUED | OUTPATIENT
Start: 2021-08-14 | End: 2021-08-17 | Stop reason: HOSPADM

## 2021-08-14 RX ORDER — AMOXICILLIN 250 MG
1 CAPSULE ORAL
Status: DISCONTINUED | OUTPATIENT
Start: 2021-08-14 | End: 2021-08-17 | Stop reason: HOSPADM

## 2021-08-14 RX ORDER — ACETAMINOPHEN 325 MG/1
650 TABLET ORAL EVERY 6 HOURS PRN
Status: DISCONTINUED | OUTPATIENT
Start: 2021-08-14 | End: 2021-08-17 | Stop reason: HOSPADM

## 2021-08-14 RX ORDER — BUTALBITAL, ACETAMINOPHEN AND CAFFEINE 50; 325; 40 MG/1; MG/1; MG/1
1 TABLET ORAL EVERY 6 HOURS PRN
Status: DISCONTINUED | OUTPATIENT
Start: 2021-08-14 | End: 2021-08-17 | Stop reason: HOSPADM

## 2021-08-14 RX ORDER — FUROSEMIDE 20 MG/1
20 TABLET ORAL DAILY
Qty: 30 TABLET | Refills: 0 | Status: SHIPPED | OUTPATIENT
Start: 2021-08-14 | End: 2021-09-15

## 2021-08-14 RX ORDER — LEVOTHYROXINE SODIUM 137 UG/1
137 TABLET ORAL
Qty: 30 TABLET | Refills: 0 | Status: SHIPPED | OUTPATIENT
Start: 2021-08-14 | End: 2021-01-01 | Stop reason: SDUPTHER

## 2021-08-14 RX ORDER — MYCOPHENOLATE MOFETIL 250 MG/1
250 CAPSULE ORAL 2 TIMES DAILY
Qty: 60 CAPSULE | Refills: 0 | Status: SHIPPED | OUTPATIENT
Start: 2021-08-14 | End: 2021-09-15

## 2021-08-14 RX ORDER — AMBRISENTAN 10 MG/1
10 TABLET, FILM COATED ORAL DAILY
Qty: 30 TABLET | Refills: 0 | Status: SHIPPED | OUTPATIENT
Start: 2021-08-14 | End: 2021-10-13 | Stop reason: SDUPTHER

## 2021-08-14 RX ORDER — AMBRISENTAN 10 MG/1
10 TABLET, FILM COATED ORAL DAILY
Status: DISCONTINUED | OUTPATIENT
Start: 2021-08-14 | End: 2021-08-17 | Stop reason: HOSPADM

## 2021-08-14 RX ORDER — TACROLIMUS 1 MG/1
4 CAPSULE ORAL DAILY
Qty: 120 CAPSULE | Refills: 0 | Status: SHIPPED | OUTPATIENT
Start: 2021-08-14 | End: 2021-09-09 | Stop reason: SDUPTHER

## 2021-08-14 RX ORDER — FLUDROCORTISONE ACETATE 0.1 MG/1
0.5 TABLET ORAL DAILY
Qty: 30 TABLET | Refills: 0 | Status: SHIPPED | OUTPATIENT
Start: 2021-08-14 | End: 2021-08-19 | Stop reason: SDUPTHER

## 2021-08-14 RX ADMIN — DOCUSATE SODIUM 50 MG AND SENNOSIDES 8.6 MG 1 TABLET: 8.6; 5 TABLET, FILM COATED ORAL at 20:56

## 2021-08-14 RX ADMIN — SERTRALINE HYDROCHLORIDE 100 MG: 100 TABLET ORAL at 04:13

## 2021-08-14 RX ADMIN — SILDENAFIL 20 MG: 20 TABLET ORAL at 04:13

## 2021-08-14 RX ADMIN — OXYCODONE HYDROCHLORIDE 10 MG: 10 TABLET ORAL at 09:18

## 2021-08-14 RX ADMIN — OMEPRAZOLE 40 MG: KIT at 04:12

## 2021-08-14 RX ADMIN — HYDROMORPHONE HYDROCHLORIDE 0.5 MG: 1 INJECTION, SOLUTION INTRAMUSCULAR; INTRAVENOUS; SUBCUTANEOUS at 15:20

## 2021-08-14 RX ADMIN — OXYCODONE HYDROCHLORIDE 10 MG: 10 TABLET ORAL at 15:28

## 2021-08-14 RX ADMIN — POTASSIUM CHLORIDE 40 MEQ: 1500 TABLET, EXTENDED RELEASE ORAL at 09:17

## 2021-08-14 RX ADMIN — GABAPENTIN 100 MG: 100 CAPSULE ORAL at 04:13

## 2021-08-14 RX ADMIN — SILDENAFIL 20 MG: 20 TABLET ORAL at 11:38

## 2021-08-14 RX ADMIN — GABAPENTIN 100 MG: 100 CAPSULE ORAL at 17:17

## 2021-08-14 RX ADMIN — TACROLIMUS 4 MG: 1 CAPSULE ORAL at 17:18

## 2021-08-14 RX ADMIN — OXYCODONE HYDROCHLORIDE 10 MG: 10 TABLET ORAL at 22:08

## 2021-08-14 RX ADMIN — LEVETIRACETAM 1500 MG: 500 TABLET, FILM COATED ORAL at 17:17

## 2021-08-14 RX ADMIN — MYCOPHENOLATE MOFETIL 250 MG: 200 POWDER, FOR SUSPENSION ORAL at 04:12

## 2021-08-14 RX ADMIN — LACOSAMIDE 100 MG: 100 TABLET, FILM COATED ORAL at 17:17

## 2021-08-14 RX ADMIN — ACETAMINOPHEN 650 MG: 325 TABLET, FILM COATED ORAL at 15:28

## 2021-08-14 RX ADMIN — SODIUM CHLORIDE 100 MG: 9 INJECTION, SOLUTION INTRAVENOUS at 06:17

## 2021-08-14 RX ADMIN — OXYCODONE HYDROCHLORIDE 10 MG: 10 TABLET ORAL at 18:37

## 2021-08-14 RX ADMIN — PRAVASTATIN SODIUM 20 MG: 20 TABLET ORAL at 04:12

## 2021-08-14 RX ADMIN — MYCOPHENOLATE MOFETIL 250 MG: 250 CAPSULE ORAL at 17:18

## 2021-08-14 RX ADMIN — BUTALBITAL, ACETAMINOPHEN, AND CAFFEINE 1 TABLET: 50; 325; 40 TABLET ORAL at 21:08

## 2021-08-14 RX ADMIN — Medication 100 MG: at 04:12

## 2021-08-14 RX ADMIN — SILDENAFIL 20 MG: 20 TABLET ORAL at 17:18

## 2021-08-14 RX ADMIN — LEVOTHYROXINE SODIUM 137 MCG: 0.03 TABLET ORAL at 04:13

## 2021-08-14 RX ADMIN — HYDROMORPHONE HYDROCHLORIDE 0.5 MG: 1 INJECTION, SOLUTION INTRAMUSCULAR; INTRAVENOUS; SUBCUTANEOUS at 20:56

## 2021-08-14 RX ADMIN — DOCUSATE SODIUM 100 MG: 50 LIQUID ORAL at 04:12

## 2021-08-14 RX ADMIN — AMBRISENTAN 10 MG: 10 TABLET, FILM COATED ORAL at 18:00

## 2021-08-14 RX ADMIN — Medication 5 MG: at 20:56

## 2021-08-14 RX ADMIN — FLUDROCORTISONE ACETATE 0.5 MG: 0.1 TABLET ORAL at 04:13

## 2021-08-14 RX ADMIN — FUROSEMIDE 20 MG: 40 TABLET ORAL at 04:13

## 2021-08-14 RX ADMIN — POLYETHYLENE GLYCOL 3350 1 PACKET: 17 POWDER, FOR SOLUTION ORAL at 04:12

## 2021-08-14 RX ADMIN — LEVETIRACETAM 1500 MG: 500 TABLET ORAL at 04:12

## 2021-08-14 ASSESSMENT — ENCOUNTER SYMPTOMS
BACK PAIN: 0
DIZZINESS: 0
NECK PAIN: 0
SPEECH CHANGE: 0
FOCAL WEAKNESS: 0
DOUBLE VISION: 0
ABDOMINAL PAIN: 0
COUGH: 0
SHORTNESS OF BREATH: 0
BLURRED VISION: 0
SEIZURES: 0
HEADACHES: 0
MYALGIAS: 0

## 2021-08-14 ASSESSMENT — FIBROSIS 4 INDEX: FIB4 SCORE: 0.85

## 2021-08-14 NOTE — PROGRESS NOTES
"Hospital Medicine Daily Progress Note    Date of Service  8/14/2021    Chief Complaint  Roxana Cuba is a 61 y.o. female admitted 8/3/2021 with SDH    Hospital Course  Roxana Cuba is a 61 y.o. female with complex PMHx significant for COPD, chronic hypoxemic respiratory failure on 3L NC, pulmonary HTN, liver transplantation in 2011 for etoh cirrhosis, granulomatous hepatitis/sarcoid, Evon-en-Y gastric bypass 2018, MGUS, CKD, ?adrenal insufficiency, hypothyroidism who presented 8/3/2021 with worsening HA and visual changes x4 weeks after being diagnosed with a SDH secondary to ground level, mechanical fall 5 weeks ago while she was in [facility]. Patient also had laparotomy for \"twisting of the bowel\" in [facility] 5 weeks ago. No prior imaging available, but MRI done here reveals 29 mm subacute/chronic L SDH and SAH adjacent to L frontal/temporal lobes with 6 mm L->R shift. NSG was consulted and brought the patient to the OR and performed left craniotomy for a large subdural hematoma with membrane formation. Subgaleal drain was placed. Patient did have some hypotension in the ICU but this resolved. Electrolytes were repleted. Thus, she was able to be transferred out to the neurosurgical floor. She was preparing to discharge home with home health and oxygen when she developed intermittent confusion and slurred speech; so repeat head CT showed increase in SDH to 14 mm depth and 3.3 mm left to right midline shift.  Patient was transferred back to the ICU per neurosurgery.  On 8/8, patient had recurrent seizures so Dilantin was added.  Her home Lasix was resumed on 8/9. Pt was seen by neurology on 8/10 - has been seizures free for about 24hrs; dilantin switched to vimpat given hx /of liver transplant. She is scheduled for MMA procedure 8/12.  8/11: Afebrile, vitals wnl, good UO. Still reports of ant headache but residing. Electrolytes K and Mg supplemented overnight. Deemed stable to transfer out of ICU. NSX team " planned for IR guided drainage of SDH.       Interval Problem Update  8/5: Internal medicine consulted and transferred out of ICU  8/6: Stable and preparing for discharge.  However, overnight, patient developed intermittent confusion and slurred speech.  Head CT ordered.  8/7: Head CT showed that the SDH had now increased to 14 mm in width.    8/8: With receptive and expressive aphasia.  Unable to perform swallow eval.  Substituting liquid sildenafil for PAH meds.  Discussed with pulmonologist Dr. Grady. Repleting magnesium and started on oral thiamine. Still in restraints.  8/9: Patient is critically ill - Multiple seizures overnight. Started on Dilantin.  Chest x-ray showed pulmonary edema.  BNP elevated at 4372.  Given a dose of Lasix.  Repleting phosphorus.  Checking urinalysis given dysuria.  8/10: No seizures (last noted 8/9 3AM). Mentation improving - AOx3-4 and to situation; speech quite clear; has occasional trouble findings words. NSX follow up - to discuss with family about possible washout of residual blood. Neurology consult Dr. Hemphill for seizures management - dilantin switched to vimpat given hx of liver transplant.     8/11: Afebrile, vitals wnl, good UO. Still reports of ant headache but residing. Electrolytes K and Mg supplemented overnight. Deemed stable to transfer out of ICU. NSX team planned for IR guided drainage of SDH.     8/12- she tells me that she is doing well. She denies pain. Swelling is improving. She is able to remove all extremities. Pending MMA procedure. Continue to monitor     8/13- she did tolerate MMA well.  she did have some headache last night. Stat CT was done . SDH slight larger 2 mm. Continue to monitor neuro-check. arrangement for home health are done per . Also oxygen is arranged. Continue to monitor. If stable will attempt discharge tomorrow     8/14- no event overnight. But she still reports headache which are worse from yesterday. Will consider repeat  images if worsen. Spoke to . Still pending home health. meds sent to our pharmacy     I have personally seen and examined the patient at bedside. I discussed the plan of care with patient and bedside RN. CM, charge nurse, neurosurgery     Consultants/Specialty  Neurosurgeon Dr. Arnold  Critical care Dr. Flores  Neurology Dr. Hemphill  Discussed with Pulmonologist Dr. Grady  IR for MMA     Code Status  Full Code    Disposition  Patient is not medically cleared. Pending MMA   Anticipate discharge to - home with home health     Review of Systems  Review of Systems   Constitutional: Positive for malaise/fatigue.   Eyes: Negative for blurred vision and double vision.   Respiratory: Negative for cough and shortness of breath.    Cardiovascular: Negative for chest pain.   Gastrointestinal: Negative for abdominal pain.   Genitourinary: Negative for dysuria.   Musculoskeletal: Negative for back pain, myalgias and neck pain.   Neurological: Negative for dizziness, speech change, focal weakness, seizures (Last 8/8 3 AM) and headaches.        Physical Exam  Temp:  [36.5 °C (97.7 °F)-37.2 °C (98.9 °F)] 37 °C (98.6 °F)  Pulse:  [60-89] 66  Resp:  [16-20] 17  BP: (102-135)/(58-78) 114/64  SpO2:  [97 %-98 %] 98 %    Physical Exam  Constitutional:       General: She is not in acute distress.     Appearance: She is well-developed.   HENT:      Head: Normocephalic.      Nose:      Comments: Cortrak in place     Mouth/Throat:      Mouth: Mucous membranes are dry.   Eyes:      General: No scleral icterus.     Conjunctiva/sclera: Conjunctivae normal.   Cardiovascular:      Rate and Rhythm: Normal rate.      Pulses: Normal pulses.      Heart sounds: No murmur heard.     Pulmonary:      Effort: Pulmonary effort is normal. No respiratory distress.      Breath sounds: No wheezing or rales.   Abdominal:      General: There is no distension.      Palpations: Abdomen is soft.      Tenderness: There is no abdominal tenderness.    Musculoskeletal:         General: Normal range of motion.      Cervical back: Normal range of motion and neck supple.      Right lower leg: Edema (trace) present.      Left lower leg: Edema (trace) present.   Skin:     General: Skin is warm.      Comments: Head staples in place CDI   Neurological:      Mental Status: She is alert.      Comments: AOx3-4 and to situation  Improving on speech    Psychiatric:         Mood and Affect: Mood normal.         Fluids    Intake/Output Summary (Last 24 hours) at 8/14/2021 1301  Last data filed at 8/14/2021 0917  Gross per 24 hour   Intake 250 ml   Output --   Net 250 ml       Laboratory  Recent Labs     08/12/21 0319 08/13/21 0223 08/14/21  0501   WBC 4.3* 4.1* 4.0*   RBC 3.05* 2.65* 2.97*   HEMOGLOBIN 9.2* 8.2* 9.2*   HEMATOCRIT 29.2* 25.6* 28.9*   MCV 95.7 96.6 97.3   MCH 30.2 30.9 31.0   MCHC 31.5* 32.0* 31.8*   RDW 55.5* 57.4* 57.4*   PLATELETCT 184 195 253   MPV 9.6 9.3 9.4     Recent Labs     08/12/21 0319 08/13/21 0223 08/14/21  0501   SODIUM 136 135 141   POTASSIUM 3.5* 3.4* 3.4*   CHLORIDE 99 100 106   CO2 30 28 29   GLUCOSE 127* 291* 88   BUN 8 8 7*   CREATININE 0.48* 0.55 0.58   CALCIUM 7.8* 7.4* 8.2*                   Imaging  CT-HEAD W/O   Final Result         1.  Left frontoparietal subdural hematoma, overall appears stable compared to prior study given differences of technique.   2.  2.8 mm left-to-right midline shift.   3.  Nonspecific white matter changes, commonly associated with small vessel ischemic disease.  Associated mild cerebral atrophy is noted.   4.  Atherosclerosis.      IR-EMBOLIZE-NEURO-INTRACRANIAL   Final Result      1.  Left subdural hemorrhage.   2.  Bilateral middle meningeal artery embolization.         IR-MIDLINE CATHETER INSERTION WO GUIDANCE > AGE 3   Final Result                  Ultrasound-guided midline placement performed by qualified nursing staff    as above.          DX-CHEST-PORTABLE (1 VIEW)   Final Result         1.   Interstitial pulmonary parenchymal prominence suggest chronic underlying lung disease, component of interstitial edema and/or infiltrates not excluded.   2.  Small left pleural effusion   3.  Cardiomegaly   4.  Atherosclerosis      CT-HEAD W/O   Final Result         1.  Left subdural hematoma, stable since prior study.   2.  3.3 mm left right midline shift, similar compared to prior study.   3.  Minimal pneumocephalus, stable to slightly decreased since prior study.   4.  Nonspecific white matter changes, commonly associated with small vessel ischemic disease.  Associated mild cerebral atrophy is noted.   5.  Atherosclerosis.      DX-ABDOMEN FOR TUBE PLACEMENT   Final Result      Feeding tube tip is stable in left abdomen in this patient with gastric bypass surgery      DX-ABDOMEN FOR TUBE PLACEMENT   Final Result      Feeding tube tip projects over the expected location of the proximal small bowel in this patient status post gastric bypass surgery      DX-ABDOMEN FOR TUBE PLACEMENT   Final Result      Enteric tube projects over the stomach.      CT-HEAD W/O   Final Result      1.  Again seen left-sided holohemispheric subdural hemorrhage with mixed density components and gas within overlying craniotomy. This measures 14 mm in depth and is unchanged.      2.  Again seen atrophy.      CT-HEAD W/O   Final Result      1.  Interval increase in size in left frontal subdural hematoma measuring up to 14 mm in width with mass effect and midline shift from left to right of 2 mm.      2.  This was discussed with Physician: MAIDA PRITCHARD at 12:50 AM.      EC-ECHOCARDIOGRAM COMPLETE W/O CONT   Final Result      CT-HEAD W/O   Final Result      1.  Status post left frontal temporal craniotomy with evacuation of left subdural hematoma. Minimal residual hemorrhage and air is present in the left subdural space.      2.  No residual left to right midline shift.      3.  Underlying atrophy and small vessel ischemic changes again  noted.      CT-HEAD W/O   Final Result      1.  Stable appearance of left frontal subdural hematoma with residual 7.5 mm left-to-right midline shift.      2.  No new hemorrhage.      3.  No herniation.      DX-CHEST-PORTABLE (1 VIEW)   Final Result      1.  Left basilar opacity may represent atelectasis or consolidation.   2.  Small left pleural effusion may be present.   3.  Atherosclerotic plaque.      MR-BRAIN-W/O   Final Result   Addendum 1 of 1   Findings were discussed with ROBERT BRYANT on 8/3/2021 1:44 PM.      Final      1.  There is subacute/chronic subdural and subarachnoid hemorrhages adjacent to the left frontal and temporal lobes. The maximum transverse dimension measures an approximately 29 mm. There is an approximately 6 mm midline shift towards right side.   2.  Mild cerebral volume loss.   3.  Mild chronic microvascular ischemic disease.      CT-HEAD W/O   Final Result      23 mm thick mixed density left frontotemporal convexity subdural hematoma with mass effect including 8 mm rightward midline shift/subfalcine herniation.      These findings were discussed with ROBERT BRYANT on 8/3/2021 9:01 AM.      DX-ABDOMEN COMPLETE WITH AP OR PA CXR   Final Result      1.  Small left pleural effusion.   2.  Moderate amount of colonic stool.   3.  No evidence of bowel obstruction.   4.  Scoliosis.           Assessment/Plan  * Traumatic subdural hematoma without loss of consciousness (HCC)- (present on admission)  Assessment & Plan  Secondary to GLF 5 weeks ago.   MRI 8/3: 29 mm L SDH w/ 6 mm L->R shift.   8/4 L craniotomy with subgaleal drain  Neurochecks  levetiracetam for seizure ppx, hold dvt ppx.  Drain removed by neurosurgery   On 8/7 developed worsening SDH confirmed on imaging along with slurred speech and then aphasia  Transferred back to ICU 8/7 for close monitoring    NSX follow up - to discuss with family about further management; IR procedure planned for 8/12.   8/12-  Pending MMA procedure.  Continue to monitor     Seizures (HCC)- (present on admission)  Assessment & Plan  8/8 - 8/9 3AM with multiple seizures episodes  ?cortical irritation from SDH    In addition to keppra, dilatin added  Today 8/11 - now essentially seizures free for about 48hrs    Neurology consult Dr. Hemphill requested to help with seizures management - dilantin switched to vimpat given hx of liver transplant.   8/12- remain seizure free and stable. 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   Echo 8/5 unremarkable and unchanged from September 2018. RSVP 35 mmHg  Previously on home ambrisentan and tadalafil.  Unable to get above meds due to cortrak  Consulted pulmonologist Dr. Grady  Substitute with liquid sildenafil for now per pulm    Considered bosentan 62.5 but patient has liver transplant so will hold off to avoid hepatotoxicity    Monitor patient's oxygen requirements and fluid status.  If patient becomes more hypoxic, check BNP as well as chest x-ray.  If those are worsened with, start diuresis and re-check echo.  If PA pressure greater than 45 on repeat echo, notify pulmonology.    Gentle diuresis as BP tolerates    Chronic respiratory failure with hypoxia (HCC)- (present on admission)  Assessment & Plan  CXR showed L basilar opacity likely atelectasis. IS.   Home oxygen arrangement     Acquired hypothyroidism- (present on admission)  Assessment &  Plan  Continue levothyroxine    Status post liver transplant Dr. Canada ([facility])- (present on admission)  Overview  -December 2011: Status post liver transplant for end stage liver disease at [facility], followed by Dr. Canada.        Assessment & Plan  For alcoholic cirrhosis  Continue home tacrolimus and mycophenolate       VTE prophylaxis: SCDs/TEDs    I have performed a physical exam and reviewed and updated ROS and Plan today (8/14/2021).

## 2021-08-14 NOTE — PROGRESS NOTES
Neurosurgery Progress Note    Subjective:  S/p fall with left SDH.  POD#10 left craniotomy for SDH  Seizure activity overnight /. Keppra and dilantin on board.   Neurology consulted and Dr. Hemphill following   Mentation improving - AOx3-4 and to situation; speech quite clear; has occasional trouble findings words.   MMA embolization complete, doing well       Exam:  Awake and alert  Alert and oriented x 3  EOMI, PERRL.  Moves extremities x4.  Slight R pronator drift   Incision intact  Has some edema to left eye as expected.     BP  Min: 102/58  Max: 135/78  Pulse  Av.3  Min: 60  Max: 89  Resp  Av.8  Min: 16  Max: 20  Temp  Av.8 °C (98.3 °F)  Min: 36.5 °C (97.7 °F)  Max: 37.2 °C (98.9 °F)  SpO2  Av.8 %  Min: 97 %  Max: 98 %    No data recorded    Recent Labs     21  0501   WBC 4.3* 4.1* 4.0*   RBC 3.05* 2.65* 2.97*   HEMOGLOBIN 9.2* 8.2* 9.2*   HEMATOCRIT 29.2* 25.6* 28.9*   MCV 95.7 96.6 97.3   MCH 30.2 30.9 31.0   MCHC 31.5* 32.0* 31.8*   RDW 55.5* 57.4* 57.4*   PLATELETCT 184 195 253   MPV 9.6 9.3 9.4     Recent Labs     21  0501   SODIUM 136 135 141   POTASSIUM 3.5* 3.4* 3.4*   CHLORIDE 99 100 106   CO2 30 28 29   GLUCOSE 127* 291* 88   BUN 8 8 7*   CREATININE 0.48* 0.55 0.58   CALCIUM 7.8* 7.4* 8.2*               Intake/Output                       21 - 21 - 08/15/21 0659     1900-0659 Total  Total                 Intake    P.O.  --  -- --  250  -- 250    P.O. -- -- -- 250 -- 250    Total Intake -- -- -- 250 -- 250       Output    Urine  --  -- --  --  -- --    Number of Times Voided 1 x 1 x 2 x -- -- --    Stool  --  -- --  --  -- --    Number of Times Stooled -- 1 x 1 x -- -- --    Total Output -- -- -- -- -- --       Net I/O     -- -- -- 250 -- 250            Intake/Output Summary (Last 24 hours) at 2021 1029  Last data filed at 2021  0917  Gross per 24 hour   Intake 250 ml   Output --   Net 250 ml            • HYDROmorphone  0.5 mg Q3HRS PRN   • levETIRAcetam  1,500 mg BID   • lacosamide (VIMPAT) ivpb  100 mg Q12HRS   • furosemide  20 mg DAILY   • artificial tears  1 Application PRN   • sildenafil  20 mg TID   • LORazepam  2 mg Q10 MIN PRN   • Pharmacy  1 Each PHARMACY TO DOSE   • butalbital/apap/caffeine -40 mg  1 Tablet Q6HRS PRN   • oxyCODONE immediate-release  5 mg Q3HRS PRN    Or   • oxyCODONE immediate-release  10 mg Q3HRS PRN   • melatonin  5 mg Nightly   • sertraline  100 mg DAILY   • senna-docusate  1 Tablet Nightly   • senna-docusate  1 Tablet Q24HRS PRN   • polyethylene glycol/lytes  1 Packet DAILY   • acetaminophen  650 mg Q6HRS PRN   • pravastatin  20 mg DAILY   • [Held by provider] ambrisentan  10 mg DAILY   • cloNIDine  0.1 mg Q4HRS PRN   • gabapentin  100 mg BID   • levothyroxine  137 mcg AM ES   • fludrocortisone  0.5 mg DAILY   • diphenhydrAMINE  25 mg Q6HRS PRN    Or   • diphenhydrAMINE  25 mg Q6HRS PRN   • magnesium hydroxide  30 mL QDAY PRN   • ALPRAZolam  0.5 mg TID PRN   • mycophenolate  250 mg BID   • tacrolimus  4 mg DAILY   • docusate sodium  100 mg BID   • omeprazole  40 mg DAILY   • Pharmacy Consult Request  1 Each PHARMACY TO DOSE   • MD ALERT...DO NOT ADMINISTER NSAIDS or ASPIRIN unless ORDERED By Neurosurgery  1 Each PRN   • ondansetron  4 mg Q4HRS PRN   • diphenhydrAMINE  25 mg Q6HRS PRN   • labetalol  10 mg Q HOUR PRN   • hydrALAZINE  10 mg Q HOUR PRN   • bisacodyl  10 mg Q24HRS PRN   • fleet  1 Each Once PRN   • benzocaine-menthol  1 Lozenge Q2HRS PRN   • [Held by provider] tadalafil  20 mg QHS       Assessment and Plan:  POD #10  Seizures have stabilized at this point  Repeat head CT with larger left SDH with only 2mm MLS  Q4 hour neuro checks OK   MMA embolization complete  Regular diet, PT/OT  Home health consult recommended, would likely be OK to go tomorrow if HH can be arranged  Staples to be removed  POD14  Call [phone] for appointment   OK to DC from neurosurgery standpoint  Will sign off    Prophylactic anticoagulation: no         Start date/time: two weeks.

## 2021-08-14 NOTE — THERAPY
Missed Therapy     Patient Name: Roxana Cuba  Age:  61 y.o., Sex:  female  Medical Record #: [id number]  Today's Date: 8/14/2021    Discussed missed therapy with nursing.        08/14/21 1447   Treatment Variance   Reason For Missed Therapy Non-Medical - Patient Refused   Interdisciplinary Plan of Care Collaboration   Collaboration Comments Attempted dysphagia therapy this date. Pt declining, stating she is not hungry. Will hold and re-attempt as pt is willing to participate.

## 2021-08-14 NOTE — CARE PLAN
The patient is Stable - Low risk of patient condition declining or worsening    Shift Goals  Clinical Goals: pain control  Patient Goals: comfort  Family Goals: sarah    Progress made toward(s) clinical / shift goals:  pain assessed, prn pain med given per MAR. Pt resting in bed comfortably    Patient is not progressing towards the following goals:

## 2021-08-14 NOTE — PROGRESS NOTES
"Pt c/o pain. Pain assessed, pt requested \"crush the pain medication and push through her midline\". Explained to pt. Pt agreed to take oral meds. Pt resting in bed comfortably. Will cont to monitor  "

## 2021-08-14 NOTE — CARE PLAN
The patient is Stable - Low risk of patient condition declining or worsening    Shift Goals  Clinical Goals: monitor neuro status  Patient Goals: pain control  Family Goals: sarah    Progress made toward(s) clinical / shift goals:  neuro status stable, pt medicated for pain per MAR - pt requesting IV dilauded only.     Patient is not progressing towards the following goals: n/a

## 2021-08-15 ENCOUNTER — APPOINTMENT (OUTPATIENT)
Dept: RADIOLOGY | Facility: MEDICAL CENTER | Age: 61
DRG: 026 | End: 2021-08-15
Attending: STUDENT IN AN ORGANIZED HEALTH CARE EDUCATION/TRAINING PROGRAM
Payer: MEDICARE

## 2021-08-15 PROCEDURE — A9270 NON-COVERED ITEM OR SERVICE: HCPCS | Performed by: INTERNAL MEDICINE

## 2021-08-15 PROCEDURE — 700102 HCHG RX REV CODE 250 W/ 637 OVERRIDE(OP): Performed by: INTERNAL MEDICINE

## 2021-08-15 PROCEDURE — 770006 HCHG ROOM/CARE - MED/SURG/GYN SEMI*

## 2021-08-15 PROCEDURE — 70450 CT HEAD/BRAIN W/O DYE: CPT | Mod: MC

## 2021-08-15 PROCEDURE — 700102 HCHG RX REV CODE 250 W/ 637 OVERRIDE(OP): Performed by: STUDENT IN AN ORGANIZED HEALTH CARE EDUCATION/TRAINING PROGRAM

## 2021-08-15 PROCEDURE — 99233 SBSQ HOSP IP/OBS HIGH 50: CPT | Performed by: INTERNAL MEDICINE

## 2021-08-15 PROCEDURE — 700111 HCHG RX REV CODE 636 W/ 250 OVERRIDE (IP): Performed by: INTERNAL MEDICINE

## 2021-08-15 PROCEDURE — A9270 NON-COVERED ITEM OR SERVICE: HCPCS | Performed by: STUDENT IN AN ORGANIZED HEALTH CARE EDUCATION/TRAINING PROGRAM

## 2021-08-15 PROCEDURE — 700111 HCHG RX REV CODE 636 W/ 250 OVERRIDE (IP): Performed by: STUDENT IN AN ORGANIZED HEALTH CARE EDUCATION/TRAINING PROGRAM

## 2021-08-15 RX ORDER — SUMATRIPTAN 6 MG/.5ML
6 INJECTION, SOLUTION SUBCUTANEOUS ONCE
Status: COMPLETED | OUTPATIENT
Start: 2021-08-15 | End: 2021-08-15

## 2021-08-15 RX ORDER — CYCLOBENZAPRINE HCL 10 MG
10 TABLET ORAL ONCE
Status: COMPLETED | OUTPATIENT
Start: 2021-08-15 | End: 2021-08-15

## 2021-08-15 RX ORDER — LORAZEPAM 2 MG/ML
2 INJECTION INTRAMUSCULAR ONCE
Status: DISCONTINUED | OUTPATIENT
Start: 2021-08-15 | End: 2021-08-15

## 2021-08-15 RX ADMIN — POLYETHYLENE GLYCOL 3350 1 PACKET: 17 POWDER, FOR SOLUTION ORAL at 05:10

## 2021-08-15 RX ADMIN — LACOSAMIDE 100 MG: 100 TABLET, FILM COATED ORAL at 05:08

## 2021-08-15 RX ADMIN — OMEPRAZOLE 20 MG: 20 CAPSULE, DELAYED RELEASE ORAL at 05:09

## 2021-08-15 RX ADMIN — PRAVASTATIN SODIUM 20 MG: 20 TABLET ORAL at 05:09

## 2021-08-15 RX ADMIN — MYCOPHENOLATE MOFETIL 250 MG: 250 CAPSULE ORAL at 17:17

## 2021-08-15 RX ADMIN — SILDENAFIL 20 MG: 20 TABLET ORAL at 05:08

## 2021-08-15 RX ADMIN — LEVETIRACETAM 1500 MG: 500 TABLET, FILM COATED ORAL at 05:09

## 2021-08-15 RX ADMIN — MYCOPHENOLATE MOFETIL 250 MG: 250 CAPSULE ORAL at 05:09

## 2021-08-15 RX ADMIN — DOCUSATE SODIUM 100 MG: 50 LIQUID ORAL at 05:09

## 2021-08-15 RX ADMIN — FENTANYL CITRATE 25 MCG: 50 INJECTION, SOLUTION INTRAMUSCULAR; INTRAVENOUS at 00:54

## 2021-08-15 RX ADMIN — SUMATRIPTAN 6 MG: 6 INJECTION, SOLUTION SUBCUTANEOUS at 22:51

## 2021-08-15 RX ADMIN — FUROSEMIDE 20 MG: 20 TABLET ORAL at 05:09

## 2021-08-15 RX ADMIN — OXYCODONE HYDROCHLORIDE 10 MG: 10 TABLET ORAL at 05:09

## 2021-08-15 RX ADMIN — DOCUSATE SODIUM 50 MG AND SENNOSIDES 8.6 MG 1 TABLET: 8.6; 5 TABLET, FILM COATED ORAL at 22:53

## 2021-08-15 RX ADMIN — HYDROMORPHONE HYDROCHLORIDE 0.5 MG: 1 INJECTION, SOLUTION INTRAMUSCULAR; INTRAVENOUS; SUBCUTANEOUS at 18:17

## 2021-08-15 RX ADMIN — SILDENAFIL 20 MG: 20 TABLET ORAL at 12:23

## 2021-08-15 RX ADMIN — HYDROMORPHONE HYDROCHLORIDE 0.5 MG: 1 INJECTION, SOLUTION INTRAMUSCULAR; INTRAVENOUS; SUBCUTANEOUS at 09:28

## 2021-08-15 RX ADMIN — ALPRAZOLAM 0.5 MG: 0.5 TABLET ORAL at 03:53

## 2021-08-15 RX ADMIN — SERTRALINE HYDROCHLORIDE 100 MG: 100 TABLET ORAL at 05:09

## 2021-08-15 RX ADMIN — OXYCODONE HYDROCHLORIDE 10 MG: 10 TABLET ORAL at 09:57

## 2021-08-15 RX ADMIN — SILDENAFIL 20 MG: 20 TABLET ORAL at 22:53

## 2021-08-15 RX ADMIN — OXYCODONE HYDROCHLORIDE 10 MG: 10 TABLET ORAL at 17:17

## 2021-08-15 RX ADMIN — LACOSAMIDE 100 MG: 100 TABLET, FILM COATED ORAL at 17:15

## 2021-08-15 RX ADMIN — FLUDROCORTISONE ACETATE 0.5 MG: 0.1 TABLET ORAL at 05:08

## 2021-08-15 RX ADMIN — LEVETIRACETAM 1500 MG: 500 TABLET, FILM COATED ORAL at 17:16

## 2021-08-15 RX ADMIN — AMBRISENTAN 10 MG: 10 TABLET, FILM COATED ORAL at 18:00

## 2021-08-15 RX ADMIN — TACROLIMUS 4 MG: 1 CAPSULE ORAL at 17:15

## 2021-08-15 RX ADMIN — GABAPENTIN 100 MG: 100 CAPSULE ORAL at 17:17

## 2021-08-15 RX ADMIN — SUMATRIPTAN 6 MG: 6 INJECTION, SOLUTION SUBCUTANEOUS at 12:23

## 2021-08-15 RX ADMIN — LEVOTHYROXINE SODIUM 137 MCG: 137 TABLET ORAL at 05:09

## 2021-08-15 RX ADMIN — BUTALBITAL, ACETAMINOPHEN, AND CAFFEINE 1 TABLET: 50; 325; 40 TABLET ORAL at 03:53

## 2021-08-15 RX ADMIN — GABAPENTIN 100 MG: 100 CAPSULE ORAL at 05:08

## 2021-08-15 RX ADMIN — Medication 5 MG: at 22:53

## 2021-08-15 RX ADMIN — CYCLOBENZAPRINE 10 MG: 10 TABLET, FILM COATED ORAL at 12:22

## 2021-08-15 ASSESSMENT — ENCOUNTER SYMPTOMS
BACK PAIN: 0
BLURRED VISION: 0
SPEECH CHANGE: 0
DOUBLE VISION: 0
ABDOMINAL PAIN: 0
NECK PAIN: 0
SEIZURES: 0
MYALGIAS: 0
HEADACHES: 1
FOCAL WEAKNESS: 0
SHORTNESS OF BREATH: 0
COUGH: 0
DIZZINESS: 0

## 2021-08-15 NOTE — CARE PLAN
The patient is Stable - Low risk of patient condition declining or worsening    Shift Goals  Clinical Goals: Pain control   Patient Goals: Pain control   Family Goals: ADAM     Progress made toward(s) clinical / shift goals:  See following note.    Patient is not progressing towards the following goals:      Problem: Pain - Standard  Goal: Alleviation of pain or a reduction in pain to the patient’s comfort goal  Outcome: Not Progressing  Note: MD notified of pts persistent headache. Pt educated on rebound headaches from narcotics. Offered non-pharmacologic pain interventions such as dimming the lights, ice/heat packs, and closing the door. New muscle relaxant and migraine medications administered per MAR.

## 2021-08-15 NOTE — PROGRESS NOTES
0040: Paged Dr. Nixon about pt's persistent headache of 10/10 with little relief from previous pain meds. Order placed for one time dose of Fentanyl. Will continue to monitor and update MD if symptoms do not resolve.     0142: Notified Dr. Nixon of pt's PÉREZ remaining after Fentanyl dose. STAT head CT ordered. Pt taken down for CT by this RN. Will continue to monitor.

## 2021-08-15 NOTE — CARE PLAN
The patient is Watcher - Medium risk of patient condition declining or worsening    Shift Goals  Clinical Goals: pain control/stable neuro exam  Patient Goals: pain control  Family Goals: sarah    Progress made toward(s) clinical / shift goals:  pt medicated per MAR. Pain persists. Orders place by Dr. Nixon. Will continue to monitor. Pt remains neurologically stable.     Patient is not progressing towards the following goals:      Problem: Pain - Standard  Goal: Alleviation of pain or a reduction in pain to the patient’s comfort goal  Outcome: Not Progressing     Problem: Pain - Post Surgery  Goal: Alleviation or reduction of pain post surgery  Outcome: Not Progressing

## 2021-08-15 NOTE — PROGRESS NOTES
"Hospital Medicine Daily Progress Note    Date of Service  8/15/2021    Chief Complaint  Roxana Cuba is a 61 y.o. female admitted 8/3/2021 with SDH    Hospital Course  Roxana Cuba is a 61 y.o. female with complex PMHx significant for COPD, chronic hypoxemic respiratory failure on 3L NC, pulmonary HTN, liver transplantation in 2011 for etoh cirrhosis, granulomatous hepatitis/sarcoid, Evon-en-Y gastric bypass 2018, MGUS, CKD, ?adrenal insufficiency, hypothyroidism who presented 8/3/2021 with worsening HA and visual changes x4 weeks after being diagnosed with a SDH secondary to ground level, mechanical fall 5 weeks ago while she was in [facility]. Patient also had laparotomy for \"twisting of the bowel\" in [facility] 5 weeks ago. No prior imaging available, but MRI done here reveals 29 mm subacute/chronic L SDH and SAH adjacent to L frontal/temporal lobes with 6 mm L->R shift. NSG was consulted and brought the patient to the OR and performed left craniotomy for a large subdural hematoma with membrane formation. Subgaleal drain was placed. Patient did have some hypotension in the ICU but this resolved. Electrolytes were repleted. Thus, she was able to be transferred out to the neurosurgical floor. She was preparing to discharge home with home health and oxygen when she developed intermittent confusion and slurred speech; so repeat head CT showed increase in SDH to 14 mm depth and 3.3 mm left to right midline shift.  Patient was transferred back to the ICU per neurosurgery.  On 8/8, patient had recurrent seizures so Dilantin was added.  Her home Lasix was resumed on 8/9. Pt was seen by neurology on 8/10 - has been seizures free for about 24hrs; dilantin switched to vimpat given hx /of liver transplant. She is scheduled for MMA procedure 8/12.  8/11: Afebrile, vitals wnl, good UO. Still reports of ant headache but residing. Electrolytes K and Mg supplemented overnight. Deemed stable to transfer out of ICU. NSX team " planned for IR guided drainage of SDH.       Interval Problem Update  8/5: Internal medicine consulted and transferred out of ICU  8/6: Stable and preparing for discharge.  However, overnight, patient developed intermittent confusion and slurred speech.  Head CT ordered.  8/7: Head CT showed that the SDH had now increased to 14 mm in width.    8/8: With receptive and expressive aphasia.  Unable to perform swallow eval.  Substituting liquid sildenafil for PAH meds.  Discussed with pulmonologist Dr. Grady. Repleting magnesium and started on oral thiamine. Still in restraints.  8/9: Patient is critically ill - Multiple seizures overnight. Started on Dilantin.  Chest x-ray showed pulmonary edema.  BNP elevated at 4372.  Given a dose of Lasix.  Repleting phosphorus.  Checking urinalysis given dysuria.  8/10: No seizures (last noted 8/9 3AM). Mentation improving - AOx3-4 and to situation; speech quite clear; has occasional trouble findings words. NSX follow up - to discuss with family about possible washout of residual blood. Neurology consult Dr. Hemphill for seizures management - dilantin switched to vimpat given hx of liver transplant.     8/11: Afebrile, vitals wnl, good UO. Still reports of ant headache but residing. Electrolytes K and Mg supplemented overnight. Deemed stable to transfer out of ICU. NSX team planned for IR guided drainage of SDH.     8/12- she tells me that she is doing well. She denies pain. Swelling is improving. She is able to remove all extremities. Pending MMA procedure. Continue to monitor     8/13- she did tolerate MMA well.  she did have some headache last night. Stat CT was done . SDH slight larger 2 mm. Continue to monitor neuro-check. arrangement for home health are done per . Also oxygen is arranged. Continue to monitor. If stable will attempt discharge tomorrow     8/14- no event overnight. But she still reports headache which are worse from yesterday. Will consider repeat  images if worsen. Spoke to . Still pending home health. meds sent to our pharmacy     8/15-severe headache last night. She did receive fentanyl with no relieve. Head CT improvement. Requesting dilaudid. Crying from pain. Sensitive to the light. One dose of dilaudid given. One dose of imitrex too. Muscle relaxant. Not sure if related to ambrisentan ?? Looks mix headache from bleeding/migraine. Also concern for rebound headache. Continue to monitor      I have personally seen and examined the patient at bedside. I discussed the plan of care with patient and bedside RN. CM, charge nurse, neurosurgery     Consultants/Specialty  Neurosurgeon Dr. Arnold  Critical care Dr. Flores  Neurology Dr. Hemphill  Discussed with Pulmonologist Dr. Ysabel BATEMAN for MMA     Code Status  Full Code    Disposition  Patient is not medically cleared.   Anticipate discharge to - home with home health     Review of Systems  Review of Systems   Constitutional: Positive for malaise/fatigue.   Eyes: Negative for blurred vision and double vision.   Respiratory: Negative for cough and shortness of breath.    Cardiovascular: Negative for chest pain.   Gastrointestinal: Negative for abdominal pain.   Genitourinary: Negative for dysuria.   Musculoskeletal: Negative for back pain, myalgias and neck pain.   Neurological: Positive for headaches. Negative for dizziness, speech change, focal weakness and seizures (Last 8/8 3 AM).        Physical Exam  Temp:  [36.6 °C (97.9 °F)-37 °C (98.6 °F)] 36.6 °C (97.9 °F)  Pulse:  [63-86] 63  Resp:  [16-18] 17  BP: (104-122)/(53-67) 104/57  SpO2:  [95 %-99 %] 97 %    Physical Exam  Constitutional:       General: She is not in acute distress.     Appearance: She is well-developed.   HENT:      Head: Normocephalic.      Nose:      Comments: Cortrak in place     Mouth/Throat:      Mouth: Mucous membranes are dry.   Eyes:      General: No scleral icterus.     Conjunctiva/sclera: Conjunctivae normal.    Cardiovascular:      Rate and Rhythm: Normal rate.      Pulses: Normal pulses.      Heart sounds: No murmur heard.     Pulmonary:      Effort: Pulmonary effort is normal. No respiratory distress.      Breath sounds: No wheezing or rales.   Abdominal:      General: There is no distension.      Palpations: Abdomen is soft.      Tenderness: There is no abdominal tenderness.   Musculoskeletal:         General: Normal range of motion.      Cervical back: Normal range of motion and neck supple.      Right lower leg: Edema (trace) present.      Left lower leg: Edema (trace) present.   Skin:     General: Skin is warm.      Comments: Head staples in place CDI   Neurological:      Mental Status: She is alert.      Comments: AOx3-4 and to situation  Improving on speech    Psychiatric:         Mood and Affect: Mood normal.         Fluids    Intake/Output Summary (Last 24 hours) at 8/15/2021 1209  Last data filed at 8/15/2021 0900  Gross per 24 hour   Intake 360 ml   Output --   Net 360 ml       Laboratory  Recent Labs     08/13/21 0223 08/14/21  0501   WBC 4.1* 4.0*   RBC 2.65* 2.97*   HEMOGLOBIN 8.2* 9.2*   HEMATOCRIT 25.6* 28.9*   MCV 96.6 97.3   MCH 30.9 31.0   MCHC 32.0* 31.8*   RDW 57.4* 57.4*   PLATELETCT 195 253   MPV 9.3 9.4     Recent Labs     08/13/21 0223 08/14/21  0501   SODIUM 135 141   POTASSIUM 3.4* 3.4*   CHLORIDE 100 106   CO2 28 29   GLUCOSE 291* 88   BUN 8 7*   CREATININE 0.55 0.58   CALCIUM 7.4* 8.2*                   Imaging  CT-HEAD W/O   Final Result      1.  Postsurgical changes status post left frontoparietal craniotomy.   2.  Left subdural hematoma is not significantly changed in size compared to prior.   3.  1.7 mm midline shift to the right   4.  Atrophy   5.  Left frontal and bilateral anterior ethmoid paranasal sinus disease.      CT-HEAD W/O   Final Result         1.  Left frontoparietal subdural hematoma, overall appears stable compared to prior study given differences of technique.   2.  2.8  mm left-to-right midline shift.   3.  Nonspecific white matter changes, commonly associated with small vessel ischemic disease.  Associated mild cerebral atrophy is noted.   4.  Atherosclerosis.      IR-EMBOLIZE-NEURO-INTRACRANIAL   Final Result      1.  Left subdural hemorrhage.   2.  Bilateral middle meningeal artery embolization.         IR-MIDLINE CATHETER INSERTION WO GUIDANCE > AGE 3   Final Result                  Ultrasound-guided midline placement performed by qualified nursing staff    as above.          DX-CHEST-PORTABLE (1 VIEW)   Final Result         1.  Interstitial pulmonary parenchymal prominence suggest chronic underlying lung disease, component of interstitial edema and/or infiltrates not excluded.   2.  Small left pleural effusion   3.  Cardiomegaly   4.  Atherosclerosis      CT-HEAD W/O   Final Result         1.  Left subdural hematoma, stable since prior study.   2.  3.3 mm left right midline shift, similar compared to prior study.   3.  Minimal pneumocephalus, stable to slightly decreased since prior study.   4.  Nonspecific white matter changes, commonly associated with small vessel ischemic disease.  Associated mild cerebral atrophy is noted.   5.  Atherosclerosis.      DX-ABDOMEN FOR TUBE PLACEMENT   Final Result      Feeding tube tip is stable in left abdomen in this patient with gastric bypass surgery      DX-ABDOMEN FOR TUBE PLACEMENT   Final Result      Feeding tube tip projects over the expected location of the proximal small bowel in this patient status post gastric bypass surgery      DX-ABDOMEN FOR TUBE PLACEMENT   Final Result      Enteric tube projects over the stomach.      CT-HEAD W/O   Final Result      1.  Again seen left-sided holohemispheric subdural hemorrhage with mixed density components and gas within overlying craniotomy. This measures 14 mm in depth and is unchanged.      2.  Again seen atrophy.      CT-HEAD W/O   Final Result      1.  Interval increase in size in left  frontal subdural hematoma measuring up to 14 mm in width with mass effect and midline shift from left to right of 2 mm.      2.  This was discussed with Physician: MAIDA PRITCHARD at 12:50 AM.      EC-ECHOCARDIOGRAM COMPLETE W/O CONT   Final Result      CT-HEAD W/O   Final Result      1.  Status post left frontal temporal craniotomy with evacuation of left subdural hematoma. Minimal residual hemorrhage and air is present in the left subdural space.      2.  No residual left to right midline shift.      3.  Underlying atrophy and small vessel ischemic changes again noted.      CT-HEAD W/O   Final Result      1.  Stable appearance of left frontal subdural hematoma with residual 7.5 mm left-to-right midline shift.      2.  No new hemorrhage.      3.  No herniation.      DX-CHEST-PORTABLE (1 VIEW)   Final Result      1.  Left basilar opacity may represent atelectasis or consolidation.   2.  Small left pleural effusion may be present.   3.  Atherosclerotic plaque.      MR-BRAIN-W/O   Final Result   Addendum 1 of 1   Findings were discussed with ROBERT BRYANT on 8/3/2021 1:44 PM.      Final      1.  There is subacute/chronic subdural and subarachnoid hemorrhages adjacent to the left frontal and temporal lobes. The maximum transverse dimension measures an approximately 29 mm. There is an approximately 6 mm midline shift towards right side.   2.  Mild cerebral volume loss.   3.  Mild chronic microvascular ischemic disease.      CT-HEAD W/O   Final Result      23 mm thick mixed density left frontotemporal convexity subdural hematoma with mass effect including 8 mm rightward midline shift/subfalcine herniation.      These findings were discussed with ROBERT BRYANT on 8/3/2021 9:01 AM.      DX-ABDOMEN COMPLETE WITH AP OR PA CXR   Final Result      1.  Small left pleural effusion.   2.  Moderate amount of colonic stool.   3.  No evidence of bowel obstruction.   4.  Scoliosis.           Assessment/Plan  * Traumatic subdural  hematoma without loss of consciousness (HCC)- (present on admission)  Assessment & Plan  Secondary to GLF 5 weeks ago.   MRI 8/3: 29 mm L SDH w/ 6 mm L->R shift.   8/4 L craniotomy with subgaleal drain  Neurochecks  levetiracetam for seizure ppx, hold dvt ppx.  Drain removed by neurosurgery   On 8/7 developed worsening SDH confirmed on imaging along with slurred speech and then aphasia  Transferred back to ICU 8/7 for close monitoring    NSX follow up - to discuss with family about further management; IR procedure planned for 8/12.   8/12-  Pending MMA procedure. Continue to monitor   8/13- repeat CT head improving. Continue to monitor     Seizures (HCC)- (present on admission)  Assessment & Plan  8/8 - 8/9 3AM with multiple seizures episodes  ?cortical irritation from SDH    In addition to keppra, dilatin added  Today 8/11 - now essentially seizures free for about 48hrs    Neurology consult Dr. Hemphill requested to help with seizures management - dilantin switched to vimpat given hx of liver transplant.   8/12- remain seizure free and stable. 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 Changing in nature. She denies previous migraine headache. Repeat head CT 8/14- improving midline shift   Continue supportive measurement.   imitrex shot.   There is some features of migraine headache, sensitivity to light, sharp   Requesting dilaudid . Concern for rebound headache too     GERD (gastroesophageal reflux disease)- (present on admission)  Assessment & Plan  Continue  PPI    Pulmonary hypertension (HCC)- (present on admission)  Assessment & Plan  Chronic, not in acute RHF.   Echo 8/5 unremarkable and unchanged from September 2018. RSVP 35 mmHg  Previously on home ambrisentan and tadalafil.  Unable to get above meds due to cortrak  Consulted pulmonologist Dr. Ysabel Elizondo with liquid sildenafil for now per pulm    Considered bosentan 62.5 but patient has liver transplant so will hold off to avoid hepatotoxicity    Monitor patient's oxygen requirements and fluid status.  If patient becomes more hypoxic, check BNP as well as chest x-ray.  If those are worsened with, start diuresis and re-check echo.  If PA pressure greater than 45 on repeat echo, notify pulmonology.    Gentle diuresis as BP tolerates    Chronic respiratory failure with hypoxia (HCC)- (present on admission)  Assessment & Plan  CXR showed L basilar opacity likely atelectasis. IS.   Home oxygen arrangement     Acquired hypothyroidism- (present on admission)  Assessment & Plan  Continue levothyroxine    Status post liver transplant Dr. Canada ([facility])- (present on admission)  Overview  -December 2011: Status post liver transplant for end stage liver disease at [facility], followed by Dr. Canada.        Assessment & Plan  For alcoholic cirrhosis  Continue home tacrolimus and mycophenolate       VTE prophylaxis: SCDs/TEDs    I have performed a physical exam and reviewed and updated ROS and Plan today (8/15/2021).

## 2021-08-15 NOTE — ASSESSMENT & PLAN NOTE
8/15- multifactorial. Changing in nature. She denies previous migraine headache. Repeat head CT 8/14- improving midline shift   Continue supportive measurement.   imitrex shot.   There is some features of migraine headache, sensitivity to light, sharp   Requesting dilaudid . Concern for rebound headache too   8/16- accidental hit her head. Please see above. Continue neuro checks. Continue to monitor

## 2021-08-16 ENCOUNTER — APPOINTMENT (OUTPATIENT)
Dept: RADIOLOGY | Facility: MEDICAL CENTER | Age: 61
DRG: 026 | End: 2021-08-16
Attending: INTERNAL MEDICINE
Payer: MEDICARE

## 2021-08-16 ENCOUNTER — PHARMACY VISIT (OUTPATIENT)
Dept: PHARMACY | Facility: MEDICAL CENTER | Age: 61
End: 2021-08-16
Payer: COMMERCIAL

## 2021-08-16 PROBLEM — H57.89 EYE SWELLING, LEFT: Status: RESOLVED | Noted: 2021-08-07 | Resolved: 2021-08-16

## 2021-08-16 PROBLEM — I69.191 DYSPHAGIA FOLLOWING NONTRAUMATIC INTRACEREBRAL HEMORRHAGE: Status: RESOLVED | Noted: 2021-08-08 | Resolved: 2021-08-16

## 2021-08-16 LAB
ALBUMIN SERPL BCP-MCNC: 2.1 G/DL (ref 3.2–4.9)
ALBUMIN/GLOB SERPL: 1.1 G/DL
ALP SERPL-CCNC: 67 U/L (ref 30–99)
ALT SERPL-CCNC: 7 U/L (ref 2–50)
ANION GAP SERPL CALC-SCNC: 6 MMOL/L (ref 7–16)
AST SERPL-CCNC: 10 U/L (ref 12–45)
BASOPHILS # BLD AUTO: 0.9 % (ref 0–1.8)
BASOPHILS # BLD: 0.03 K/UL (ref 0–0.12)
BILIRUB SERPL-MCNC: <0.2 MG/DL (ref 0.1–1.5)
BUN SERPL-MCNC: 8 MG/DL (ref 8–22)
CALCIUM SERPL-MCNC: 7.6 MG/DL (ref 8.5–10.5)
CHLORIDE SERPL-SCNC: 108 MMOL/L (ref 96–112)
CO2 SERPL-SCNC: 28 MMOL/L (ref 20–33)
CREAT SERPL-MCNC: 0.48 MG/DL (ref 0.5–1.4)
EOSINOPHIL # BLD AUTO: 0.16 K/UL (ref 0–0.51)
EOSINOPHIL NFR BLD: 4.8 % (ref 0–6.9)
ERYTHROCYTE [DISTWIDTH] IN BLOOD BY AUTOMATED COUNT: 56.7 FL (ref 35.9–50)
GLOBULIN SER CALC-MCNC: 2 G/DL (ref 1.9–3.5)
GLUCOSE SERPL-MCNC: 142 MG/DL (ref 65–99)
HCT VFR BLD AUTO: 26.2 % (ref 37–47)
HGB BLD-MCNC: 8.1 G/DL (ref 12–16)
IMM GRANULOCYTES # BLD AUTO: 0.04 K/UL (ref 0–0.11)
IMM GRANULOCYTES NFR BLD AUTO: 1.2 % (ref 0–0.9)
LYMPHOCYTES # BLD AUTO: 0.98 K/UL (ref 1–4.8)
LYMPHOCYTES NFR BLD: 29.4 % (ref 22–41)
MCH RBC QN AUTO: 30.1 PG (ref 27–33)
MCHC RBC AUTO-ENTMCNC: 30.9 G/DL (ref 33.6–35)
MCV RBC AUTO: 97.4 FL (ref 81.4–97.8)
MONOCYTES # BLD AUTO: 0.34 K/UL (ref 0–0.85)
MONOCYTES NFR BLD AUTO: 10.2 % (ref 0–13.4)
NEUTROPHILS # BLD AUTO: 1.78 K/UL (ref 2–7.15)
NEUTROPHILS NFR BLD: 53.5 % (ref 44–72)
NRBC # BLD AUTO: 0 K/UL
NRBC BLD-RTO: 0 /100 WBC
PLATELET # BLD AUTO: 222 K/UL (ref 164–446)
PMV BLD AUTO: 9.1 FL (ref 9–12.9)
POTASSIUM SERPL-SCNC: 3.8 MMOL/L (ref 3.6–5.5)
PROT SERPL-MCNC: 4.1 G/DL (ref 6–8.2)
RBC # BLD AUTO: 2.69 M/UL (ref 4.2–5.4)
SODIUM SERPL-SCNC: 142 MMOL/L (ref 135–145)
WBC # BLD AUTO: 3.3 K/UL (ref 4.8–10.8)

## 2021-08-16 PROCEDURE — 85025 COMPLETE CBC W/AUTO DIFF WBC: CPT

## 2021-08-16 PROCEDURE — A9270 NON-COVERED ITEM OR SERVICE: HCPCS | Performed by: INTERNAL MEDICINE

## 2021-08-16 PROCEDURE — 700111 HCHG RX REV CODE 636 W/ 250 OVERRIDE (IP): Performed by: STUDENT IN AN ORGANIZED HEALTH CARE EDUCATION/TRAINING PROGRAM

## 2021-08-16 PROCEDURE — 70450 CT HEAD/BRAIN W/O DYE: CPT | Mod: MC

## 2021-08-16 PROCEDURE — 36415 COLL VENOUS BLD VENIPUNCTURE: CPT

## 2021-08-16 PROCEDURE — A9270 NON-COVERED ITEM OR SERVICE: HCPCS | Performed by: STUDENT IN AN ORGANIZED HEALTH CARE EDUCATION/TRAINING PROGRAM

## 2021-08-16 PROCEDURE — 700102 HCHG RX REV CODE 250 W/ 637 OVERRIDE(OP): Performed by: STUDENT IN AN ORGANIZED HEALTH CARE EDUCATION/TRAINING PROGRAM

## 2021-08-16 PROCEDURE — 92526 ORAL FUNCTION THERAPY: CPT

## 2021-08-16 PROCEDURE — 99233 SBSQ HOSP IP/OBS HIGH 50: CPT | Performed by: INTERNAL MEDICINE

## 2021-08-16 PROCEDURE — 700111 HCHG RX REV CODE 636 W/ 250 OVERRIDE (IP): Performed by: INTERNAL MEDICINE

## 2021-08-16 PROCEDURE — 770006 HCHG ROOM/CARE - MED/SURG/GYN SEMI*

## 2021-08-16 PROCEDURE — 80053 COMPREHEN METABOLIC PANEL: CPT

## 2021-08-16 PROCEDURE — 700102 HCHG RX REV CODE 250 W/ 637 OVERRIDE(OP): Performed by: INTERNAL MEDICINE

## 2021-08-16 RX ADMIN — GABAPENTIN 100 MG: 100 CAPSULE ORAL at 04:35

## 2021-08-16 RX ADMIN — Medication 5 MG: at 20:53

## 2021-08-16 RX ADMIN — OMEPRAZOLE 20 MG: 20 CAPSULE, DELAYED RELEASE ORAL at 04:34

## 2021-08-16 RX ADMIN — POLYETHYLENE GLYCOL 3350 1 PACKET: 17 POWDER, FOR SOLUTION ORAL at 04:36

## 2021-08-16 RX ADMIN — AMBRISENTAN 10 MG: 10 TABLET, FILM COATED ORAL at 18:00

## 2021-08-16 RX ADMIN — MYCOPHENOLATE MOFETIL 250 MG: 250 CAPSULE ORAL at 04:35

## 2021-08-16 RX ADMIN — GABAPENTIN 100 MG: 100 CAPSULE ORAL at 18:01

## 2021-08-16 RX ADMIN — FUROSEMIDE 20 MG: 20 TABLET ORAL at 04:35

## 2021-08-16 RX ADMIN — OXYCODONE HYDROCHLORIDE 10 MG: 10 TABLET ORAL at 14:24

## 2021-08-16 RX ADMIN — SILDENAFIL 20 MG: 20 TABLET ORAL at 13:22

## 2021-08-16 RX ADMIN — TACROLIMUS 4 MG: 1 CAPSULE ORAL at 18:02

## 2021-08-16 RX ADMIN — LEVETIRACETAM 1500 MG: 500 TABLET, FILM COATED ORAL at 04:34

## 2021-08-16 RX ADMIN — FLUDROCORTISONE ACETATE 0.5 MG: 0.1 TABLET ORAL at 04:34

## 2021-08-16 RX ADMIN — BUTALBITAL, ACETAMINOPHEN, AND CAFFEINE 1 TABLET: 50; 325; 40 TABLET ORAL at 22:56

## 2021-08-16 RX ADMIN — LACOSAMIDE 100 MG: 100 TABLET, FILM COATED ORAL at 18:01

## 2021-08-16 RX ADMIN — SILDENAFIL 20 MG: 20 TABLET ORAL at 04:34

## 2021-08-16 RX ADMIN — LEVOTHYROXINE SODIUM 137 MCG: 137 TABLET ORAL at 04:35

## 2021-08-16 RX ADMIN — OXYCODONE HYDROCHLORIDE 10 MG: 10 TABLET ORAL at 04:34

## 2021-08-16 RX ADMIN — DOCUSATE SODIUM 50 MG AND SENNOSIDES 8.6 MG 1 TABLET: 8.6; 5 TABLET, FILM COATED ORAL at 20:53

## 2021-08-16 RX ADMIN — SERTRALINE HYDROCHLORIDE 100 MG: 100 TABLET ORAL at 04:35

## 2021-08-16 RX ADMIN — BUTALBITAL, ACETAMINOPHEN, AND CAFFEINE 1 TABLET: 50; 325; 40 TABLET ORAL at 10:31

## 2021-08-16 RX ADMIN — DOCUSATE SODIUM 100 MG: 50 LIQUID ORAL at 04:35

## 2021-08-16 RX ADMIN — PRAVASTATIN SODIUM 20 MG: 20 TABLET ORAL at 04:35

## 2021-08-16 RX ADMIN — MYCOPHENOLATE MOFETIL 250 MG: 250 CAPSULE ORAL at 18:02

## 2021-08-16 RX ADMIN — HYDROMORPHONE HYDROCHLORIDE 0.5 MG: 1 INJECTION, SOLUTION INTRAMUSCULAR; INTRAVENOUS; SUBCUTANEOUS at 20:53

## 2021-08-16 RX ADMIN — DIPHENHYDRAMINE HYDROCHLORIDE 25 MG: 25 TABLET ORAL at 20:53

## 2021-08-16 RX ADMIN — OXYCODONE HYDROCHLORIDE 10 MG: 10 TABLET ORAL at 18:10

## 2021-08-16 RX ADMIN — HYDROMORPHONE HYDROCHLORIDE 0.5 MG: 1 INJECTION, SOLUTION INTRAMUSCULAR; INTRAVENOUS; SUBCUTANEOUS at 15:30

## 2021-08-16 RX ADMIN — SILDENAFIL 20 MG: 20 TABLET ORAL at 18:01

## 2021-08-16 RX ADMIN — LACOSAMIDE 100 MG: 100 TABLET, FILM COATED ORAL at 04:35

## 2021-08-16 RX ADMIN — ALPRAZOLAM 0.5 MG: 0.5 TABLET ORAL at 22:07

## 2021-08-16 RX ADMIN — LEVETIRACETAM 1500 MG: 500 TABLET, FILM COATED ORAL at 18:01

## 2021-08-16 ASSESSMENT — ENCOUNTER SYMPTOMS
HEADACHES: 1
ABDOMINAL PAIN: 0
COUGH: 0
SEIZURES: 0
MYALGIAS: 0
NECK PAIN: 0
DOUBLE VISION: 0
SPEECH CHANGE: 0
FOCAL WEAKNESS: 0
BACK PAIN: 0
SHORTNESS OF BREATH: 0
BLURRED VISION: 0
DIZZINESS: 0

## 2021-08-16 NOTE — DISCHARGE PLANNING
Meds-to-Beds: Discharge prescription orders listed below delivered to patient's bedside. RN Jeniffer notified. Patient counseled. Patient elected to have co-payment billed to patient account.     Letairis not covered by insurance. Patient states she has this medication available at home.     Current Outpatient Medications   Medication Sig Dispense Refill   • tacrolimus (PROGRAF) 1 MG Cap Take 4 Capsules by mouth every day for 30 days. 120 Capsule 0   • fludrocortisone (FLORINEF) 0.1 MG Tab Take 5 Tablets by mouth every day. 30 Tablet 0   • furosemide (LASIX) 20 MG Tab Take 1 Tablet by mouth every day for 30 days. 30 Tablet 0   • gabapentin (NEURONTIN) 100 MG Cap Take 1 Capsule by mouth 2 times a day for 30 days. 60 Capsule 0   • levetiracetam (KEPPRA) 750 MG tablet Take 2 Tablets by Enteral Tube route 2 times a day for 30 days. 120 Tablet 0   • levothyroxine (SYNTHROID) 137 MCG Tab Take 1 Tablet by mouth every morning on an empty stomach. 30 Tablet 0   • mycophenolate (CELLCEPT) 250 MG Cap Take 1 Capsule by mouth 2 times a day for 30 days. 60 Capsule 0   • pravastatin (PRAVACHOL) 20 MG Tab Take 1 Tablet by mouth every day. 30 Tablet 0   • sertraline (ZOLOFT) 100 MG Tab Take 1 Tablet by mouth every day. 30 Tablet 0   • oxyCODONE immediate release (ROXICODONE) 10 MG immediate release tablet 1 Tablet by Enteral Tube route every 6 hours as needed for up to 5 days. 20 Tablet 0   • tadalafil (CIALIS) 20 MG tablet Take 1 Tablet by mouth at bedtime for 30 days. 30 Tablet 0   • lacosamide (VIMPAT) 100 MG Tab tablet Take 1 Tablet by mouth 2 times a day for 30 days. 60 Tablet 0      Cheli Petit, PharmD

## 2021-08-16 NOTE — PROGRESS NOTES
Pt reports that she bumped her head on her bedside table while reaching for something. MD notified and came to bedside. Per MD, pt will stay in hospital for monitoring over night. Repeat head CT if neuro changes occur. Pt reminded that she needs to call for assistance, fall precautions in place. Dr Busch will call to update .     1620: pt continues to endorse 10/10 head pain despite medications given per MAR, no neuro neuro changes on assessment. MD notified. Stat head CT ordered.

## 2021-08-16 NOTE — DISCHARGE INSTRUCTIONS
Discharge Instructions per Deja Busch M.D.    Avoid blood thinners, aspirin, NSAIDs including Advil, ibuprofen, meloxicam, naproxen.  Follow-up with neurosurgery within 10 days    DIET: Healthy    ACTIVITY: As tolerated    DIAGNOSIS: Traumatic subdural hematoma without loss of consciousness     Return to ER if worsening headache, swelling, vision changes, focal weakness, fever, confusion, unusual bleeding, chest pain, shortness of breath, pain, persistent diarrhea      Discharge Instructions    Discharged to home by car with relative. Discharged via wheelchair, hospital escort: Yes.  Special equipment needed: Oxygen    Be sure to schedule a follow-up appointment with your primary care doctor or any specialists as instructed.     Discharge Plan:   Diet Plan: Discussed  Activity Level: Discussed  Confirmed Follow up Appointment: Patient to Call and Schedule Appointment  Confirmed Symptoms Management: Discussed  Medication Reconciliation Updated: Yes    I understand that a diet low in cholesterol, fat, and sodium is recommended for good health. Unless I have been given specific instructions below for another diet, I accept this instruction as my diet prescription.   Other diet: Regular    Special Instructions: None    · Is patient discharged on Warfarin / Coumadin?   No     Depression / Suicide Risk    As you are discharged from this [facility] facility, it is important to learn how to keep safe from harming yourself.    Recognize the warning signs:  · Abrupt changes in personality, positive or negative- including increase in energy   · Giving away possessions  · Change in eating patterns- significant weight changes-  positive or negative  · Change in sleeping patterns- unable to sleep or sleeping all the time   · Unwillingness or inability to communicate  · Depression  · Unusual sadness, discouragement and loneliness  · Talk of wanting to die  · Neglect of personal appearance   · Rebelliousness- reckless  behavior  · Withdrawal from people/activities they love  · Confusion- inability to concentrate     If you or a loved one observes any of these behaviors or has concerns about self-harm, here's what you can do:  · Talk about it- your feelings and reasons for harming yourself  · Remove any means that you might use to hurt yourself (examples: pills, rope, extension cords, firearm)  · Get professional help from the community (Mental Health, Substance Abuse, psychological counseling)  · Do not be alone:Call your Safe Contact- someone whom you trust who will be there for you.  · Call your local CRISIS HOTLINE 596-2875 or 940-927-7349  · Call your local Children's Mobile Crisis Response Team [place] (954) 537-0074 or www.[employer]  · Call the toll free National Suicide Prevention Hotlines   · National Suicide Prevention Lifeline 693-702-KGVM (6748)  · National Hope Line Network 800-SUICIDE (346-5261)

## 2021-08-16 NOTE — THERAPY
"Speech Language Pathology  Daily Treatment     Patient Name: Roxana Cuba  Age:  61 y.o., Sex:  female  Medical Record #: [id number]  Today's Date: 8/16/2021     Precautions  Precautions: Fall Risk, Swallow Precautions ( See Comments)  Comments: L crani    Assessment    Pt seen this date for dysphagia intervention with reg/thin liquid breakfast tray. Upon entrance to room, pt positioned with HOB at ~30* and eating breakfast. HOB raised to 90* by clinician. Timely swallow initiation and clear vocal quality appreciated. Pt demonstrated functional mastication with no oral residue appreciated. No s/sx of aspiration appreciated with any consistencies consumed. Pt denied increased headache with mastication.     Recommend continuation of reg/thins with adherence to safe swallow strategies (upright at 90*, slow rate, small bites) and meds as tolerated. SLP to d/c from dysphagia therapy 2/2 goals met.     Plan    Discharge secondary to goals met.    Discharge Recommendations: (P) Anticipate that the patient will have no further speech therapy needs after discharge from the hospital    Subjective    Pt alert and participated with mod encouragement.      Objective       08/16/21 0854   Dysphagia    Dysphagia X   Positioning / Behavior Modification Modulate Rate or Bite Size;Self Monitoring   Other Treatments reg/thin breakfast tray   Diet / Liquid Recommendation Thin (0);Regular (7)   Nutritional Liquid Intake Rating Scale Non thickened beverages   Nutritional Food Intake Rating Scale Total oral diet with no restrictions   Nursing Communication Swallow Precaution Sign Posted at Head of Bed   Skilled Intervention Compensatory Strategies;Verbal Cueing   Recommended Route of Medication Administration   Medication Administration  Whole with Liquid Wash   Patient / Family Goals   Patient / Family Goal #1 \"Please\"   Goal #1 Outcome Goal met   Short Term Goals   Short Term Goal # 2 B  Pt will consume a diet of reg/thins with no s/sx " of aspiration and min cues .   Goal Outcome  # 2 B Goal met

## 2021-08-16 NOTE — DISCHARGE PLANNING
ATTN: Case Management  RE: Referral for Home Health    As of 08/16/21, we have accepted the Home Health referral for the patient listed above.    A [facility] clinician will be out to see the patient within 48 hours. If you have any questions or concerns regarding the patient's transition to Home Health, please do not hesitate to contact us at [phone].      We look forward to collaborating with you,  [facility] Team

## 2021-08-16 NOTE — CARE PLAN
The patient is Stable - Low risk of patient condition declining or worsening    Shift Goals  Clinical Goals: pain control  Patient Goals: pain control  Family Goals: ADAM    Progress made toward(s) clinical / shift goals:  Pain medication administered as prescribed. Calm quiet environment provided.    Patient is not progressing towards the following goals:

## 2021-08-16 NOTE — PROGRESS NOTES
"Hospital Medicine Daily Progress Note    Date of Service  8/16/2021    Chief Complaint  Roxana Cuba is a 61 y.o. female admitted 8/3/2021 with SDH    Hospital Course  Roxana Cuba is a 61 y.o. female with complex PMHx significant for COPD, chronic hypoxemic respiratory failure on 3L NC, pulmonary HTN, liver transplantation in 2011 for etoh cirrhosis, granulomatous hepatitis/sarcoid, Evon-en-Y gastric bypass 2018, MGUS, CKD, ?adrenal insufficiency, hypothyroidism who presented 8/3/2021 with worsening HA and visual changes x4 weeks after being diagnosed with a SDH secondary to ground level, mechanical fall 5 weeks ago while she was in [facility]. Patient also had laparotomy for \"twisting of the bowel\" in [facility] 5 weeks ago. No prior imaging available, but MRI done here reveals 29 mm subacute/chronic L SDH and SAH adjacent to L frontal/temporal lobes with 6 mm L->R shift. NSG was consulted and brought the patient to the OR and performed left craniotomy for a large subdural hematoma with membrane formation. Subgaleal drain was placed. Patient did have some hypotension in the ICU but this resolved. Electrolytes were repleted. Thus, she was able to be transferred out to the neurosurgical floor. She was preparing to discharge home with home health and oxygen when she developed intermittent confusion and slurred speech; so repeat head CT showed increase in SDH to 14 mm depth and 3.3 mm left to right midline shift.  Patient was transferred back to the ICU per neurosurgery.  On 8/8, patient had recurrent seizures so Dilantin was added.  Her home Lasix was resumed on 8/9. Pt was seen by neurology on 8/10 - has been seizures free for about 24hrs; dilantin switched to vimpat given hx /of liver transplant. She is scheduled for MMA procedure 8/12.   She did have MMA procedure 8/12. After procedure pt was monitored overnight. She did have severe headache. Head CT was repeated 8/13 and 8/15. Left subdural hematoma no " significant changes. Midline shifting right improved from 2.8 mm to 1.7. 8/16- she was about to be discharge,but she accidentally hit        Interval Problem Update  8/5: Internal medicine consulted and transferred out of ICU  8/6: Stable and preparing for discharge.  However, overnight, patient developed intermittent confusion and slurred speech.  Head CT ordered.  8/7: Head CT showed that the SDH had now increased to 14 mm in width.    8/8: With receptive and expressive aphasia.  Unable to perform swallow eval.  Substituting liquid sildenafil for PAH meds.  Discussed with pulmonologist Dr. Grady. Repleting magnesium and started on oral thiamine. Still in restraints.  8/9: Patient is critically ill - Multiple seizures overnight. Started on Dilantin.  Chest x-ray showed pulmonary edema.  BNP elevated at 4372.  Given a dose of Lasix.  Repleting phosphorus.  Checking urinalysis given dysuria.  8/10: No seizures (last noted 8/9 3AM). Mentation improving - AOx3-4 and to situation; speech quite clear; has occasional trouble findings words. NSX follow up - to discuss with family about possible washout of residual blood. Neurology consult Dr. Hemphill for seizures management - dilantin switched to vimpat given hx of liver transplant.     8/11: Afebrile, vitals wnl, good UO. Still reports of ant headache but residing. Electrolytes K and Mg supplemented overnight. Deemed stable to transfer out of ICU. NSX team planned for IR guided drainage of SDH.     8/12- she tells me that she is doing well. She denies pain. Swelling is improving. She is able to remove all extremities. Pending MMA procedure. Continue to monitor     8/13- she did tolerate MMA well.  she did have some headache last night. Stat CT was done . SDH slight larger 2 mm. Continue to monitor neuro-check. arrangement for home health are done per . Also oxygen is arranged. Continue to monitor. If stable will attempt discharge tomorrow     8/14- no event  overnight. But she still reports headache which are worse from yesterday. Will consider repeat images if worsen. Spoke to . Still pending home health. meds sent to our pharmacy     8/15-severe headache last night. She did receive fentanyl with no relieve. Head CT improvement. Requesting dilaudid. Crying from pain. Sensitive to the light. One dose of dilaudid given. One dose of imitrex too. Muscle relaxant. Not sure if related to ambrisentan ?? Looks mix headache from bleeding/migraine. Also concern for rebound headache. Continue to monitor      8/16- headache was better today in the morning. imitrex did help. She looked frustrated in the morning. She was more cooperative later. She tells me that she is feeling ready and agreed on being discharge. Once everything was set for her to go, she did bump her head on the table.. she has a bruise/bump on the left side. Some worse temporal headache. There is no new neuro deficits or changes. Pt medicated properly. She will remain inpatient to be monitored further. If any changes pls order stat CT head. If remain stable, everything is set for her to go tomorrow      I have personally seen and examined the patient at bedside. I discussed the plan of care with patient and bedside RN. CM, charge nurse, neurosurgery     Consultants/Specialty  Neurosurgeon Dr. Arnold  Critical care Dr. Flores  Neurology Dr. Hemphill  Discussed with Pulmonologist Dr. Grady  IR for MMA     Code Status  Full Code    Disposition  Patient is not medically cleared.   Anticipate discharge to - home with home health     Review of Systems  Review of Systems   Constitutional: Positive for malaise/fatigue.   Eyes: Negative for blurred vision and double vision.   Respiratory: Negative for cough and shortness of breath.    Cardiovascular: Negative for chest pain.   Gastrointestinal: Negative for abdominal pain.   Genitourinary: Negative for dysuria.   Musculoskeletal: Negative for back pain, myalgias  and neck pain.   Neurological: Positive for headaches. Negative for dizziness, speech change, focal weakness and seizures (Last 8/8 3 AM).        Physical Exam  Temp:  [36.3 °C (97.4 °F)-37 °C (98.6 °F)] 36.9 °C (98.5 °F)  Pulse:  [66-76] 67  Resp:  [16-18] 18  BP: (113-131)/(62-74) 113/62  SpO2:  [94 %-98 %] 94 %    Physical Exam  Constitutional:       General: She is not in acute distress.     Appearance: She is well-developed.   HENT:      Head: Normocephalic.      Nose:      Comments: Cortrak in place     Mouth/Throat:      Mouth: Mucous membranes are dry.   Eyes:      General: No scleral icterus.     Conjunctiva/sclera: Conjunctivae normal.   Cardiovascular:      Rate and Rhythm: Normal rate.      Pulses: Normal pulses.      Heart sounds: No murmur heard.     Pulmonary:      Effort: Pulmonary effort is normal. No respiratory distress.      Breath sounds: No wheezing or rales.   Abdominal:      General: There is no distension.      Palpations: Abdomen is soft.      Tenderness: There is no abdominal tenderness.   Musculoskeletal:         General: Normal range of motion.      Cervical back: Normal range of motion and neck supple.      Right lower leg: Edema (trace) present.      Left lower leg: Edema (trace) present.   Skin:     General: Skin is warm.      Comments: Head staples in place CDI   Neurological:      Mental Status: She is alert.      Comments: AOx3-4 and to situation  Improving on speech    Psychiatric:         Mood and Affect: Mood normal.         Fluids  No intake or output data in the 24 hours ending 08/16/21 1515    Laboratory  Recent Labs     08/14/21  0501 08/16/21  0303   WBC 4.0* 3.3*   RBC 2.97* 2.69*   HEMOGLOBIN 9.2* 8.1*   HEMATOCRIT 28.9* 26.2*   MCV 97.3 97.4   MCH 31.0 30.1   MCHC 31.8* 30.9*   RDW 57.4* 56.7*   PLATELETCT 253 222   MPV 9.4 9.1     Recent Labs     08/14/21  0501 08/16/21  0303   SODIUM 141 142   POTASSIUM 3.4* 3.8   CHLORIDE 106 108   CO2 29 28   GLUCOSE 88 142*   BUN 7*  8   CREATININE 0.58 0.48*   CALCIUM 8.2* 7.6*                   Imaging  CT-HEAD W/O   Final Result      1.  Postsurgical changes status post left frontoparietal craniotomy.   2.  Left subdural hematoma is not significantly changed in size compared to prior.   3.  1.7 mm midline shift to the right   4.  Atrophy   5.  Left frontal and bilateral anterior ethmoid paranasal sinus disease.      CT-HEAD W/O   Final Result         1.  Left frontoparietal subdural hematoma, overall appears stable compared to prior study given differences of technique.   2.  2.8 mm left-to-right midline shift.   3.  Nonspecific white matter changes, commonly associated with small vessel ischemic disease.  Associated mild cerebral atrophy is noted.   4.  Atherosclerosis.      IR-EMBOLIZE-NEURO-INTRACRANIAL   Final Result      1.  Left subdural hemorrhage.   2.  Bilateral middle meningeal artery embolization.         IR-MIDLINE CATHETER INSERTION WO GUIDANCE > AGE 3   Final Result                  Ultrasound-guided midline placement performed by qualified nursing staff    as above.          DX-CHEST-PORTABLE (1 VIEW)   Final Result         1.  Interstitial pulmonary parenchymal prominence suggest chronic underlying lung disease, component of interstitial edema and/or infiltrates not excluded.   2.  Small left pleural effusion   3.  Cardiomegaly   4.  Atherosclerosis      CT-HEAD W/O   Final Result         1.  Left subdural hematoma, stable since prior study.   2.  3.3 mm left right midline shift, similar compared to prior study.   3.  Minimal pneumocephalus, stable to slightly decreased since prior study.   4.  Nonspecific white matter changes, commonly associated with small vessel ischemic disease.  Associated mild cerebral atrophy is noted.   5.  Atherosclerosis.      DX-ABDOMEN FOR TUBE PLACEMENT   Final Result      Feeding tube tip is stable in left abdomen in this patient with gastric bypass surgery      DX-ABDOMEN FOR TUBE PLACEMENT    Final Result      Feeding tube tip projects over the expected location of the proximal small bowel in this patient status post gastric bypass surgery      DX-ABDOMEN FOR TUBE PLACEMENT   Final Result      Enteric tube projects over the stomach.      CT-HEAD W/O   Final Result      1.  Again seen left-sided holohemispheric subdural hemorrhage with mixed density components and gas within overlying craniotomy. This measures 14 mm in depth and is unchanged.      2.  Again seen atrophy.      CT-HEAD W/O   Final Result      1.  Interval increase in size in left frontal subdural hematoma measuring up to 14 mm in width with mass effect and midline shift from left to right of 2 mm.      2.  This was discussed with Physician: MAIDA PRITCHARD at 12:50 AM.      EC-ECHOCARDIOGRAM COMPLETE W/O CONT   Final Result      CT-HEAD W/O   Final Result      1.  Status post left frontal temporal craniotomy with evacuation of left subdural hematoma. Minimal residual hemorrhage and air is present in the left subdural space.      2.  No residual left to right midline shift.      3.  Underlying atrophy and small vessel ischemic changes again noted.      CT-HEAD W/O   Final Result      1.  Stable appearance of left frontal subdural hematoma with residual 7.5 mm left-to-right midline shift.      2.  No new hemorrhage.      3.  No herniation.      DX-CHEST-PORTABLE (1 VIEW)   Final Result      1.  Left basilar opacity may represent atelectasis or consolidation.   2.  Small left pleural effusion may be present.   3.  Atherosclerotic plaque.      MR-BRAIN-W/O   Final Result   Addendum 1 of 1   Findings were discussed with ROBERT BRYANT on 8/3/2021 1:44 PM.      Final      1.  There is subacute/chronic subdural and subarachnoid hemorrhages adjacent to the left frontal and temporal lobes. The maximum transverse dimension measures an approximately 29 mm. There is an approximately 6 mm midline shift towards right side.   2.  Mild cerebral volume  loss.   3.  Mild chronic microvascular ischemic disease.      CT-HEAD W/O   Final Result      23 mm thick mixed density left frontotemporal convexity subdural hematoma with mass effect including 8 mm rightward midline shift/subfalcine herniation.      These findings were discussed with ROBERT BRYANT on 8/3/2021 9:01 AM.      DX-ABDOMEN COMPLETE WITH AP OR PA CXR   Final Result      1.  Small left pleural effusion.   2.  Moderate amount of colonic stool.   3.  No evidence of bowel obstruction.   4.  Scoliosis.           Assessment/Plan  * Traumatic subdural hematoma without loss of consciousness (HCC)- (present on admission)  Assessment & Plan  Secondary to GLF 5 weeks ago.   MRI 8/3: 29 mm L SDH w/ 6 mm L->R shift.   8/4 L craniotomy with subgaleal drain  Neurochecks  levetiracetam for seizure ppx, hold dvt ppx.  Drain removed by neurosurgery   On 8/7 developed worsening SDH confirmed on imaging along with slurred speech and then aphasia  Transferred back to ICU 8/7 for close monitoring    NSX follow up - to discuss with family about further management; IR procedure planned for 8/12.   8/12-  Pending MMA procedure. Continue to monitor   8/13- repeat CT head improving. Continue to monitor   8/16- because of accident pls continue to monitor and consider to repeat head CT stat if any changes     Seizures (HCC)- (present on admission)  Assessment & Plan  8/8 - 8/9 3AM with multiple seizures episodes  ?cortical irritation from SDH    In addition to keppra, dilatin added  Today 8/11 - now essentially seizures free for about 48hrs    Neurology consult Dr. Hemphill requested to help with seizures management - dilantin switched to vimpat given hx of liver transplant.   8/12- remain seizure free and stable. Continue to monitor   8/16- remain seizure free     [place]'s disease (HCC)- (present on admission)  Assessment & Plan  Continue fludrocortisone  With some hypotension in ICU  Monitor blood pressures    Headache- (present  on admission)  Assessment & Plan  8/15- multifactorial. Changing in nature. She denies previous migraine headache. Repeat head CT 8/14- improving midline shift   Continue supportive measurement.   imitrex shot.   There is some features of migraine headache, sensitivity to light, sharp   Requesting dilaudid . Concern for rebound headache too   8/16- accidental hit her head. Please see above. Continue neuro checks. Continue to monitor     GERD (gastroesophageal reflux disease)- (present on admission)  Assessment & Plan  Continue PPI    Pulmonary hypertension (HCC)- (present on admission)  Assessment & Plan  Chronic, not in acute RHF.   Echo 8/5 unremarkable and unchanged from September 2018. RSVP 35 mmHg  Previously on home ambrisentan and tadalafil.  Unable to get above meds due to cortrak  Consulted pulmonologist Dr. Grady  Substitute with liquid sildenafil for now per pulm    Considered bosentan 62.5 but patient has liver transplant so will hold off to avoid hepatotoxicity    Monitor patient's oxygen requirements and fluid status.  If patient becomes more hypoxic, check BNP as well as chest x-ray.  If those are worsened with, start diuresis and re-check echo.  If PA pressure greater than 45 on repeat echo, notify pulmonology.    Gentle diuresis as BP tolerates    Chronic respiratory failure with hypoxia (HCC)- (present on admission)  Assessment & Plan  CXR showed L basilar opacity likely atelectasis. IS.   Home oxygen arrangement     Acquired hypothyroidism- (present on admission)  Assessment & Plan  Continue levothyroxine    Status post liver transplant Dr. Canada ([facility])- (present on admission)  Overview  -December 2011: Status post liver transplant for end stage liver disease at [facility], followed by Dr. Canada.        Assessment & Plan  For alcoholic cirrhosis  Continue home tacrolimus and mycophenolate       VTE prophylaxis: SCDs/TEDs    I have performed a physical exam and reviewed and updated ROS and  Plan today (8/16/2021).

## 2021-08-17 ENCOUNTER — TELEPHONE (OUTPATIENT)
Dept: MEDICAL GROUP | Facility: LAB | Age: 61
End: 2021-08-17

## 2021-08-17 VITALS
OXYGEN SATURATION: 94 % | WEIGHT: 151.46 LBS | HEIGHT: 69 IN | HEART RATE: 71 BPM | BODY MASS INDEX: 22.43 KG/M2 | TEMPERATURE: 97.8 F | RESPIRATION RATE: 16 BRPM | SYSTOLIC BLOOD PRESSURE: 118 MMHG | DIASTOLIC BLOOD PRESSURE: 55 MMHG

## 2021-08-17 PROCEDURE — 700102 HCHG RX REV CODE 250 W/ 637 OVERRIDE(OP): Performed by: STUDENT IN AN ORGANIZED HEALTH CARE EDUCATION/TRAINING PROGRAM

## 2021-08-17 PROCEDURE — 99239 HOSP IP/OBS DSCHRG MGMT >30: CPT | Performed by: HOSPITALIST

## 2021-08-17 PROCEDURE — 700111 HCHG RX REV CODE 636 W/ 250 OVERRIDE (IP): Performed by: INTERNAL MEDICINE

## 2021-08-17 PROCEDURE — A9270 NON-COVERED ITEM OR SERVICE: HCPCS | Performed by: INTERNAL MEDICINE

## 2021-08-17 PROCEDURE — 700102 HCHG RX REV CODE 250 W/ 637 OVERRIDE(OP): Performed by: INTERNAL MEDICINE

## 2021-08-17 PROCEDURE — A9270 NON-COVERED ITEM OR SERVICE: HCPCS | Performed by: STUDENT IN AN ORGANIZED HEALTH CARE EDUCATION/TRAINING PROGRAM

## 2021-08-17 RX ADMIN — GABAPENTIN 100 MG: 100 CAPSULE ORAL at 06:01

## 2021-08-17 RX ADMIN — FUROSEMIDE 20 MG: 20 TABLET ORAL at 06:01

## 2021-08-17 RX ADMIN — OXYCODONE HYDROCHLORIDE 10 MG: 10 TABLET ORAL at 06:00

## 2021-08-17 RX ADMIN — DOCUSATE SODIUM 100 MG: 50 LIQUID ORAL at 06:00

## 2021-08-17 RX ADMIN — OXYCODONE HYDROCHLORIDE 10 MG: 10 TABLET ORAL at 10:26

## 2021-08-17 RX ADMIN — FLUDROCORTISONE ACETATE 0.5 MG: 0.1 TABLET ORAL at 06:02

## 2021-08-17 RX ADMIN — OMEPRAZOLE 20 MG: 20 CAPSULE, DELAYED RELEASE ORAL at 06:02

## 2021-08-17 RX ADMIN — PRAVASTATIN SODIUM 20 MG: 20 TABLET ORAL at 06:01

## 2021-08-17 RX ADMIN — BUTALBITAL, ACETAMINOPHEN, AND CAFFEINE 1 TABLET: 50; 325; 40 TABLET ORAL at 09:15

## 2021-08-17 RX ADMIN — LACOSAMIDE 100 MG: 100 TABLET, FILM COATED ORAL at 06:00

## 2021-08-17 RX ADMIN — SILDENAFIL 20 MG: 20 TABLET ORAL at 06:01

## 2021-08-17 RX ADMIN — LEVETIRACETAM 1500 MG: 500 TABLET, FILM COATED ORAL at 06:01

## 2021-08-17 RX ADMIN — MYCOPHENOLATE MOFETIL 250 MG: 250 CAPSULE ORAL at 06:01

## 2021-08-17 RX ADMIN — LEVOTHYROXINE SODIUM 137 MCG: 137 TABLET ORAL at 06:01

## 2021-08-17 RX ADMIN — POLYETHYLENE GLYCOL 3350 1 PACKET: 17 POWDER, FOR SOLUTION ORAL at 06:01

## 2021-08-17 RX ADMIN — SERTRALINE HYDROCHLORIDE 100 MG: 100 TABLET ORAL at 06:00

## 2021-08-17 RX ADMIN — OXYCODONE HYDROCHLORIDE 10 MG: 10 TABLET ORAL at 00:45

## 2021-08-17 NOTE — PROGRESS NOTES
Pt discharged home with , Otis. Discharge education provided to pt, pt verbalized understanding of education provided. Meds to beds delivered, pts home medications returned. Midline removed per Dr Butt's verbal order. Home oxygen delivered to bedside.

## 2021-08-17 NOTE — TELEPHONE ENCOUNTER
ESTABLISHED PATIENT PRE-VISIT PLANNING     Patient was NOT contacted to complete PVP.     Note: Patient will not be contacted if there is no indication to call.     1.  Reviewed notes from the last few office visits within the medical group: Yes    2.  If any orders were placed at last visit or intended to be done for this visit (i.e. 6 mos follow-up), do we have Results/Consult Notes?         •  Labs - Labs were not ordered at last office visit.  Note: If patient appointment is for lab review and patient did not complete labs, check with provider if OK to reschedule patient until labs completed.       •  Imaging - Imaging was not ordered at last office visit.       •  Referrals - No referrals were ordered at last office visit.    3. Is this appointment scheduled as a Hospital Follow-Up? Yes, visit was at [facility].     4.  Immunizations were updated in Epic using Reconcile Outside Information activity? Yes    5.  Patient is due for the following Health Maintenance Topics:   Health Maintenance Due   Topic Date Due   • Annual Wellness Visit  Never done   • COVID-19 Vaccine (1) Never done   • Annual Pulmonary Function Test / Spirometry  08/11/2017   • IMM ZOSTER VACCINES (2 of 2) 01/29/2019   • MAMMOGRAM  11/19/2020         6.  AHA (Pulse8) form printed for Provider? Email sent to [facility] requesting form, not on any alerts list

## 2021-08-17 NOTE — DISCHARGE PLANNING
Anticipated Discharge Disposition:   Home  [facility]  DME:  Home oxygen with [facility]   [facility]    Action:    Home oxygen was delivered by [facility] per bedside RN Jeniffer.    [facility] has accepted.    [facility] has delivered.    Barriers to Discharge:    None    Plan:    DC planned for today.

## 2021-08-17 NOTE — DISCHARGE SUMMARY
"Discharge Summary    CHIEF COMPLAINT ON ADMISSION  Chief Complaint   Patient presents with   • Shortness of Breath     x 2 days. Patient states she has used 3L NC at home for the past 10 years. Patient states she has a \"pulmonary syndrome.\" Patient is 97% on RA. Patient reports her saturation decreased when laying down.    • Skin Lesion     Patient reports sarcoids x 10 years    • Headache     x 3 weeks       Reason for Admission  other     Admission Date  8/3/2021    CODE STATUS  Full Code    HPI & HOSPITAL COURSE  Roxana Cuba is a 61 y.o. female with complex PMHx significant for COPD, chronic hypoxemic respiratory failure on 3L NC, pulmonary HTN, liver transplantation in 2011 for etoh cirrhosis, granulomatous hepatitis/sarcoid, Evon-en-Y gastric bypass 2018, MGUS, CKD, ?adrenal insufficiency, hypothyroidism who presented 8/3/2021 with worsening HA and visual changes x4 weeks after being diagnosed with a SDH secondary to ground level, mechanical fall 5 weeks ago while she was in [facility]. Patient also had laparotomy for \"twisting of the bowel\" in [facility] 5 weeks ago. No prior imaging available, but MRI done here reveals 29 mm subacute/chronic L SDH and SAH adjacent to L frontal/temporal lobes with 6 mm L->R shift. NSG was consulted and brought the patient to the OR and performed left craniotomy for a large subdural hematoma with membrane formation. Subgaleal drain was placed. Patient did have some hypotension in the ICU but this resolved. Electrolytes were repleted. Thus, she was able to be transferred out to the neurosurgical floor. She was preparing to discharge home with home health and oxygen when she developed intermittent confusion and slurred speech; so repeat head CT showed increase in SDH to 14 mm depth and 3.3 mm left to right midline shift.  Patient was transferred back to the ICU per neurosurgery.  On 8/8, patient had recurrent seizures so Dilantin was added.  Her home Lasix was resumed on 8/9. Pt was " seen by neurology on 8/10 - has been seizures free for about 24hrs; dilantin switched to vimpat given hx /of liver transplant. She is scheduled for MMA procedure 8/12.   She did have MMA procedure 8/12. After procedure pt was monitored overnight. She did have severe headache. Head CT was repeated 8/13 and 8/15. Left subdural hematoma no significant changes. Midline shifting right improved from 2.8 mm to 1.7. 8/16- she was about to be discharge,but she accidentally hit  her head on the rail and had severe headache. Held overnight to monitor, PÉREZ resolving, patient to dc home. Seen and examined before DC, rounded on by NeuroSx as well.      Therefore, she is discharged in fair and stable condition to home with organized home healthcare and close outpatient follow-up.    The patient met 2-midnight criteria for an inpatient stay at the time of discharge.    Discharge Date  8/17/2021    FOLLOW UP ITEMS POST DISCHARGE  Follow up with home health, PCP and NeuroSx    DISCHARGE DIAGNOSES  Principal Problem:    Traumatic subdural hematoma without loss of consciousness (HCC) POA: Yes  Active Problems:    Status post liver transplant Dr. Canada ([facility]) POA: Yes      Overview: -December 2011: Status post liver transplant for end stage liver disease       at [facility], followed by Dr. Canada.                Acquired hypothyroidism POA: Yes    Chronic respiratory failure with hypoxia (HCC) POA: Yes    Pulmonary hypertension (HCC) POA: Yes      Overview: Initial evaluation at [facility] pre liver transplant, PFO closed w/o       complication, 1/25/2011, then worse PAH and R HF post transplant,       extensive evaluations at [facility] in [place] with cath and drug trial, responding       to tadalafil and ambrisentan dramatically. Followed there with serial R       heart cath.      3/25/2014: Most recent R heart cath done Dr. Brenda Flores with mild       pulmonary hypertension and relatively high ouput      November 2014: Echocardiogram  with normal LV size, LVEF 60-65%. Mild MR,       mild AI, mild TR, RVSP 29-34mmHg.      February 2015: Echocardiogram with mild concentric LVH, LVEF 65-70%. Mild       MR, mild AI, trace TR, RVSP 16mmHg.    GERD (gastroesophageal reflux disease) POA: Yes    Headache POA: Yes    Palmetto's disease (HCC) POA: Yes    Seizures (HCC) POA: Yes  Resolved Problems:    Hypokalemia POA: Yes    Hypernatremia POA: Yes    Eye swelling, left POA: Yes    Dysphagia following nontraumatic intracerebral hemorrhage POA: No      FOLLOW UP  Future Appointments   Date Time Provider Department Center   8/23/2021  1:30 PM Elisa Phillip M.D. [facility] None   10/12/2021 11:30 AM Gwyn Gaming M.D. [facility] None     Tramaine Arnold III, M.D.  [address]  [place] [address]  [phone]    In 1 week  Staples out post op day 14, 8/18. Please call to schedule an appointment ASAP      MEDICATIONS ON DISCHARGE     Medication List      START taking these medications      Instructions   * oxyCODONE immediate release 10 MG immediate release tablet  Commonly known as: ROXICODONE   1 Tablet by Enteral Tube route every 6 hours as needed for up to 5 days.  Dose: 10 mg     Vimpat 100 MG Tabs tablet  Generic drug: lacosamide   Take 1 Tablet by mouth 2 times a day for 30 days.  Dose: 100 mg         * This list has 1 medication(s) that are the same as other medications prescribed for you. Read the directions carefully, and ask your doctor or other care provider to review them with you.            CHANGE how you take these medications      Instructions   levetiracetam 750 MG tablet  What changed:   · medication strength  · how much to take  · how to take this  · when to take this  Commonly known as: KEPPRA   Take 2 Tablets by Enteral Tube route 2 times a day for 30 days.  Dose: 1,500 mg     tacrolimus 1 MG Caps  What changed: additional instructions  Commonly known as: PROGRAF   Doctor's comments: If pt has 3mg at home, add 1 to equal dose of 4mg  Take 4  Capsules by mouth every day for 30 days.  Dose: 4 mg        CONTINUE taking these medications      Instructions   acetaminophen 500 MG Tabs  Commonly known as: TYLENOL   Take 500 mg by mouth every 6 hours as needed for Mild Pain.  Dose: 500 mg     ALPRAZolam 1 MG Tabs  Commonly known as: XANAX   Take 1 mg by mouth 3 times a day as needed for Anxiety.  Dose: 1 mg     ambrisentan 10 MG tablet  Commonly known as: LETAIRIS   Take 1 Tablet by mouth every day.  Dose: 10 mg     [facility] ESOMEPRAZOLE MAGNESIUM 40 MG PO CPDR   Take 40 mg by mouth every day.  Dose: 40 mg     CALCIUM 500 PO   Take 1 tablet by mouth every day.  Dose: 1 Tablet     fludrocortisone 0.1 MG Tabs  Commonly known as: FLORINEF   Take 5 Tablets by mouth every day.  Dose: 0.5 mg     furosemide 20 MG Tabs  Commonly known as: LASIX   Take 1 Tablet by mouth every day for 30 days.  Dose: 20 mg     gabapentin 100 MG Caps  Commonly known as: NEURONTIN   Take 1 Capsule by mouth 2 times a day for 30 days.  Dose: 100 mg     HYOSCYAMINE PO   Take 0.2 mg by mouth as needed.  Dose: 0.2 mg     levothyroxine 137 MCG Tabs  Commonly known as: SYNTHROID   Take 1 Tablet by mouth every morning on an empty stomach.  Dose: 137 mcg     meclizine 12.5 MG Tabs  Commonly known as: ANTIVERT   Take 25 mg by mouth as needed for Nausea/Vomiting.  Dose: 25 mg     metoclopramide 10 MG Tabs  Commonly known as: REGLAN   Take 10 mg by mouth as needed. Indications: Nausea and Vomiting  Dose: 10 mg     Movantik 25 MG Tabs  Generic drug: Naloxegol Oxalate   Take 25 mg by mouth every day.  Dose: 25 mg     mycophenolate 250 MG Caps  Commonly known as: CELLCEPT   Take 1 Capsule by mouth 2 times a day for 30 days.  Dose: 250 mg     pravastatin 20 MG Tabs  Commonly known as: PRAVACHOL   Take 1 Tablet by mouth every day.  Dose: 20 mg     Prucalopride Succinate 2 MG Tabs   Take 2 mg by mouth every day.  Dose: 2 mg     sertraline 100 MG Tabs  Commonly known as: Zoloft   Take 1 Tablet by mouth every  day.  Dose: 100 mg     tadalafil 20 MG tablet  Commonly known as: CIALIS   Take 1 Tablet by mouth at bedtime for 30 days.  Dose: 20 mg     therapeutic multivitamin-minerals Tabs   Take 1 tablet by mouth every day.  Dose: 1 Tablet        STOP taking these medications    aspirin EC 81 MG Tbec  Commonly known as: ECOTRIN        ASK your doctor about these medications      Instructions   * oxyCODONE immediate release 10 MG immediate release tablet  Commonly known as: ROXICODONE  Ask about: Should I take this medication?   Take 1 tablet by mouth every 6 hours as needed for up to 5 days.  Dose: 10 mg         * This list has 1 medication(s) that are the same as other medications prescribed for you. Read the directions carefully, and ask your doctor or other care provider to review them with you.                Allergies  Allergies   Allergen Reactions   • Rhubarb Anaphylaxis   • Nsaids      Can not take due to hx of liver transplant    • Organic Nitrates      Nitroglycerin products should be avoided with the use of PDE-5 inhibitors as the combination can result in severe hypotension.  RD clarified with pt: Nitrates in food are okay and pt does not want nitrates to be listed as food allergy. Pt only avoids medications with nitrates.    • Other Drug      Any medication that may interact with cyclosporine, tacrolimus, sirolimus, or prograf (due to hx of liver transplant)    • Vancomycin Hcl      Causes red man syndrome when infused to fast         DIET  Orders Placed This Encounter   Procedures   • Diet Order Diet: Regular     Standing Status:   Standing     Number of Occurrences:   1     Order Specific Question:   Diet:     Answer:   Regular [1]       ACTIVITY  As tolerated.  Weight bearing as tolerated    CONSULTATIONS  NeuroSx    PROCEDURES  As above    LABORATORY  Lab Results   Component Value Date    SODIUM 142 08/16/2021    POTASSIUM 3.8 08/16/2021    CHLORIDE 108 08/16/2021    CO2 28 08/16/2021    GLUCOSE 142 (H)  08/16/2021    BUN 8 08/16/2021    CREATININE 0.48 (L) 08/16/2021        Lab Results   Component Value Date    WBC 3.3 (L) 08/16/2021    HEMOGLOBIN 8.1 (L) 08/16/2021    HEMATOCRIT 26.2 (L) 08/16/2021    PLATELETCT 222 08/16/2021        Total time of the discharge process exceeds 41 minutes.

## 2021-08-17 NOTE — CARE PLAN
Problem: Nutritional:  Goal: Achieve adequate nutritional intake  Description: Patient will consume >50% of meals  Outcome: Progressing. Pt eating % of yesterday's meals per ADL. Minimal records prior to yesterday. RD to continue to monitor for consistently adequate intake.

## 2021-08-17 NOTE — PROGRESS NOTES
Neurosurgery Progress Note    Subjective:  S/p fall with left SDH.  POD#13 left craniotomy for SDH  MMA embolization complete, doing well     Patient doing very well  Discharging home today       Exam:  Awake, A&O x 3  EOMI, PERRL.  Moves extremities x4.   Incision intact  Incision c/d/i      BP  Min: 103/62  Max: 122/70  Pulse  Av.8  Min: 59  Max: 79  Resp  Av.5  Min: 16  Max: 18  Temp  Av.8 °C (98.2 °F)  Min: 36.2 °C (97.2 °F)  Max: 37.3 °C (99.2 °F)  SpO2  Av.3 %  Min: 94 %  Max: 98 %    No data recorded    Recent Labs     21  0303   WBC 3.3*   RBC 2.69*   HEMOGLOBIN 8.1*   HEMATOCRIT 26.2*   MCV 97.4   MCH 30.1   MCHC 30.9*   RDW 56.7*   PLATELETCT 222   MPV 9.1     Recent Labs     21  0303   SODIUM 142   POTASSIUM 3.8   CHLORIDE 108   CO2 28   GLUCOSE 142*   BUN 8   CREATININE 0.48*   CALCIUM 7.6*               Intake/Output                       21 0700 - 21 0659 21 0700 - 21 0659     [id number] [id number] Total 5813-9787 5649-0929 Total                 Intake    P.O.  240  -- 240  --  -- --    P.O. 240 -- 240 -- -- --    Total Intake 240 -- 240 -- -- --       Output    Total Output -- -- -- -- -- --       Net I/O     240 -- 240 -- -- --            Intake/Output Summary (Last 24 hours) at 2021 1008  Last data filed at 2021 1811  Gross per 24 hour   Intake 240 ml   Output --   Net 240 ml            • lacosamide  100 mg BID   • acetaminophen  650 mg Q6HRS PRN   • ALPRAZolam  0.5 mg TID PRN   • butalbital/apap/caffeine -40 mg  1 Tablet Q6HRS PRN   • cloNIDine  0.1 mg Q4HRS PRN   • diphenhydrAMINE  25 mg Q6HRS PRN    Or   • diphenhydrAMINE  25 mg Q6HRS PRN   • fludrocortisone  0.5 mg DAILY   • furosemide  20 mg DAILY   • gabapentin  100 mg BID   • levETIRAcetam  1,500 mg BID   • levothyroxine  137 mcg AM ES   • magnesium hydroxide  30 mL QDAY PRN   • melatonin  5 mg Nightly   • oxyCODONE immediate-release  10 mg Q3HRS PRN    Or   • oxyCODONE  immediate-release  5 mg Q3HRS PRN   • polyethylene glycol/lytes  1 Packet DAILY   • pravastatin  20 mg DAILY   • senna-docusate  1 Tablet Q24HRS PRN   • senna-docusate  1 Tablet Nightly   • sertraline  100 mg DAILY   • omeprazole  20 mg DAILY   • ambrisentan  10 mg DAILY   • mycophenolate  250 mg BID   • sildenafil  20 mg TID   • tacrolimus  4 mg DAILY   • HYDROmorphone  0.5 mg Q3HRS PRN   • artificial tears  1 Application PRN   • LORazepam  2 mg Q10 MIN PRN   • docusate sodium  100 mg BID   • Pharmacy Consult Request  1 Each PHARMACY TO DOSE   • MD ALERT...DO NOT ADMINISTER NSAIDS or ASPIRIN unless ORDERED By Neurosurgery  1 Each PRN   • ondansetron  4 mg Q4HRS PRN   • diphenhydrAMINE  25 mg Q6HRS PRN   • labetalol  10 mg Q HOUR PRN   • hydrALAZINE  10 mg Q HOUR PRN   • bisacodyl  10 mg Q24HRS PRN   • fleet  1 Each Once PRN   • benzocaine-menthol  1 Lozenge Q2HRS PRN   • [Held by provider] tadalafil  20 mg QHS       Assessment and Plan:  POD #13  Dc home today  Pt to f/u in our office in the next day to two for staple removal and evaluation  Answered all questions     Prophylactic anticoagulation: no         Start date/time: two weeks.

## 2021-08-18 ENCOUNTER — HOME CARE VISIT (OUTPATIENT)
Dept: HOME HEALTH SERVICES | Facility: HOME HEALTHCARE | Age: 61
End: 2021-08-18
Payer: MEDICARE

## 2021-08-18 VITALS
OXYGEN SATURATION: 97 % | TEMPERATURE: 98.6 F | HEIGHT: 69 IN | RESPIRATION RATE: 16 BRPM | HEART RATE: 75 BPM | BODY MASS INDEX: 22.22 KG/M2 | DIASTOLIC BLOOD PRESSURE: 70 MMHG | WEIGHT: 150 LBS | SYSTOLIC BLOOD PRESSURE: 120 MMHG

## 2021-08-18 PROCEDURE — G0493 RN CARE EA 15 MIN HH/HOSPICE: HCPCS

## 2021-08-18 PROCEDURE — 665001 SOC-HOME HEALTH

## 2021-08-18 RX ORDER — MECLIZINE HCL 12.5 MG/1
25 TABLET ORAL PRN
Qty: 30 TABLET | Refills: 3 | OUTPATIENT
Start: 2021-08-18

## 2021-08-18 SDOH — ECONOMIC STABILITY: HOUSING INSECURITY
HOME SAFETY: VE A FIRE ESCAPE PLAN DEVELOPED. PATIENT DOES HAVE FLAMMABLE MATERIALS PRESENT IN THE HOME PRESENTING A FIRE HAZARD. NO EVIDENCE FOUND OF SMOKING MATERIALS PRESENT IN THE HOME.

## 2021-08-18 SDOH — ECONOMIC STABILITY: HOUSING INSECURITY: EVIDENCE OF SMOKING MATERIAL: 0

## 2021-08-18 SDOH — ECONOMIC STABILITY: HOUSING INSECURITY
HOME SAFETY: OXYGEN SAFETY RISK ASSESSMENT PERFORMED. PATIENT DOES HAVE A NO SMOKING SIGN POSTED IN THE HOME. PATIENT DOES NOT HAVE A WORKING FIRE EXTINGUISHER PRESENT IN THE HOME. SMOKE ALARMS ARE PRESENT AND FUNCTIONAL ON EACH LEVEL OF THE HOME. PATIENT DOES HA

## 2021-08-18 ASSESSMENT — FIBROSIS 4 INDEX: FIB4 SCORE: 1.04

## 2021-08-18 ASSESSMENT — PAIN SCALES - PAIN ASSESSMENT IN ADVANCED DEMENTIA (PAINAD)
TOTALSCORE: 1
BODYLANGUAGE: 0 - RELAXED.
NEGVOCALIZATION: 0 - NONE.
FACIALEXPRESSION: 1 - SAD. FRIGHTENED. FROWN.
CONSOLABILITY: 0 - NO NEED TO CONSOLE.

## 2021-08-18 ASSESSMENT — ENCOUNTER SYMPTOMS
PAIN: 1
LOWEST PAIN SEVERITY IN PAST 24 HOURS: 5/10
NAUSEA: DENIES
HIGHEST PAIN SEVERITY IN PAST 24 HOURS: 9/10
VOMITING: DENIES
PAIN LOCATION: HEAD
PAIN SEVERITY GOAL: 0/10
SUBJECTIVE PAIN PROGRESSION: UNCHANGED
PERSON REPORTING PAIN: PATIENT

## 2021-08-18 ASSESSMENT — PATIENT HEALTH QUESTIONNAIRE - PHQ9
1. LITTLE INTEREST OR PLEASURE IN DOING THINGS: 00
2. FEELING DOWN, DEPRESSED, IRRITABLE, OR HOPELESS: 00
CLINICAL INTERPRETATION OF PHQ2 SCORE: 0

## 2021-08-18 NOTE — TELEPHONE ENCOUNTER
Pt is recently discharged from the hospital.  She was released without these meds.  Can you please call them in to [facility] on [address]?  The 2 written below did not have any directions with them.         Hyoscyamine Sulfate 0.2 mg  Reglan 10 mg

## 2021-08-19 ENCOUNTER — DOCUMENTATION (OUTPATIENT)
Dept: VASCULAR LAB | Facility: MEDICAL CENTER | Age: 61
End: 2021-08-19

## 2021-08-19 NOTE — PROGRESS NOTES
[facility]    Received referral from [facility] to review patient's medication list.    Med list reviewed and reconciled.  Clinically significant DDI identified:  - Pt is on multiple CNS depressants (oxycodone, alprazolam, levetiracetam); monitor for s/s of excessive CNS depression    Allergies reviewed.    Abigail Hanley, Rosa IselaD

## 2021-08-20 ENCOUNTER — HOME CARE VISIT (OUTPATIENT)
Dept: HOME HEALTH SERVICES | Facility: HOME HEALTHCARE | Age: 61
End: 2021-08-20
Payer: MEDICARE

## 2021-08-20 ENCOUNTER — APPOINTMENT (OUTPATIENT)
Dept: HOME HEALTH SERVICES | Facility: HOME HEALTHCARE | Age: 61
End: 2021-08-20
Payer: MEDICARE

## 2021-08-20 NOTE — Clinical Note
Phone calls placed to schedule initial visit 08/19 and 08/20, patient did not answer.  PT left voicemails but did not receive any return call.  PT will request delay in PT order for next week.

## 2021-08-23 ENCOUNTER — OFFICE VISIT (OUTPATIENT)
Dept: MEDICAL GROUP | Facility: LAB | Age: 61
End: 2021-08-23
Payer: MEDICARE

## 2021-08-23 VITALS
OXYGEN SATURATION: 99 % | TEMPERATURE: 99.3 F | BODY MASS INDEX: 22.22 KG/M2 | HEIGHT: 69 IN | WEIGHT: 150 LBS | DIASTOLIC BLOOD PRESSURE: 60 MMHG | HEART RATE: 74 BPM | RESPIRATION RATE: 16 BRPM | SYSTOLIC BLOOD PRESSURE: 100 MMHG

## 2021-08-23 DIAGNOSIS — K76.81 HEPATOPULMONARY SYNDROME (HCC): ICD-10-CM

## 2021-08-23 DIAGNOSIS — F33.41 RECURRENT MAJOR DEPRESSIVE DISORDER, IN PARTIAL REMISSION (HCC): ICD-10-CM

## 2021-08-23 DIAGNOSIS — E03.9 ACQUIRED HYPOTHYROIDISM: ICD-10-CM

## 2021-08-23 DIAGNOSIS — Z79.899 HIGH RISK MEDICATION USE: ICD-10-CM

## 2021-08-23 DIAGNOSIS — D61.818 PANCYTOPENIA (HCC): ICD-10-CM

## 2021-08-23 DIAGNOSIS — J84.9 INTERSTITIAL LUNG DISEASE (HCC): ICD-10-CM

## 2021-08-23 DIAGNOSIS — J96.11 CHRONIC RESPIRATORY FAILURE WITH HYPOXIA (HCC): ICD-10-CM

## 2021-08-23 DIAGNOSIS — D47.2 MGUS (MONOCLONAL GAMMOPATHY OF UNKNOWN SIGNIFICANCE): ICD-10-CM

## 2021-08-23 DIAGNOSIS — D86.9 SARCOIDOSIS: ICD-10-CM

## 2021-08-23 DIAGNOSIS — Z94.4 STATUS POST LIVER TRANSPLANT (HCC): ICD-10-CM

## 2021-08-23 DIAGNOSIS — E27.40 ADRENAL INSUFFICIENCY (HCC): ICD-10-CM

## 2021-08-23 DIAGNOSIS — S06.5X0D TRAUMATIC SUBDURAL HEMATOMA WITHOUT LOSS OF CONSCIOUSNESS, SUBSEQUENT ENCOUNTER: ICD-10-CM

## 2021-08-23 DIAGNOSIS — R56.9 SEIZURES (HCC): ICD-10-CM

## 2021-08-23 DIAGNOSIS — D84.9 IMMUNOCOMPROMISED STATE (HCC): ICD-10-CM

## 2021-08-23 DIAGNOSIS — S42.401D CLOSED FRACTURE OF RIGHT ELBOW WITH ROUTINE HEALING: ICD-10-CM

## 2021-08-23 DIAGNOSIS — D69.6 THROMBOCYTOPENIA (HCC): ICD-10-CM

## 2021-08-23 DIAGNOSIS — I27.0 PRIMARY PULMONARY HYPERTENSION (HCC): ICD-10-CM

## 2021-08-23 DIAGNOSIS — R73.9 STEROID-INDUCED HYPERGLYCEMIA: ICD-10-CM

## 2021-08-23 DIAGNOSIS — G89.4 CHRONIC PAIN SYNDROME: ICD-10-CM

## 2021-08-23 DIAGNOSIS — T38.0X5A STEROID-INDUCED HYPERGLYCEMIA: ICD-10-CM

## 2021-08-23 PROBLEM — R20.0 NUMBNESS AND TINGLING OF LEFT THUMB: Status: RESOLVED | Noted: 2019-01-30 | Resolved: 2021-08-23

## 2021-08-23 PROBLEM — R20.2 NUMBNESS AND TINGLING OF LEFT THUMB: Status: RESOLVED | Noted: 2019-01-30 | Resolved: 2021-08-23

## 2021-08-23 PROBLEM — R51.9 HEADACHE: Status: RESOLVED | Noted: 2017-03-22 | Resolved: 2021-08-23

## 2021-08-23 PROBLEM — R10.9 AP (ABDOMINAL PAIN): Status: RESOLVED | Noted: 2019-03-05 | Resolved: 2021-08-23

## 2021-08-23 PROBLEM — K74.69 OTHER CIRRHOSIS OF LIVER (HCC): Status: RESOLVED | Noted: 2021-08-23 | Resolved: 2021-08-23

## 2021-08-23 PROBLEM — K74.69 OTHER CIRRHOSIS OF LIVER (HCC): Status: ACTIVE | Noted: 2021-08-23

## 2021-08-23 PROBLEM — E27.1 ADDISON'S DISEASE (HCC): Status: RESOLVED | Noted: 2017-11-03 | Resolved: 2021-08-23

## 2021-08-23 PROCEDURE — 99215 OFFICE O/P EST HI 40 MIN: CPT | Performed by: FAMILY MEDICINE

## 2021-08-23 ASSESSMENT — FIBROSIS 4 INDEX: FIB4 SCORE: 1.04

## 2021-08-23 NOTE — PROGRESS NOTES
Subjective:     Roxana Cuba is a 61 y.o. female here today for hospital follow-up and Annual Health Assessment.  Have not seen the patient in over 2 years as she has been in [facility].  She has multiple complex medical problems that need attention     Traumatic subdural hematoma without loss of consciousness (HCC)  Secondary to ground-level fall 5 weeks ago while in [facility].  Left subdural hematoma with left-to-right shift.  Left craniotomy performed 8/4/2021.  MMA procedure performed 8/12/2021.  Repeat head CT was stable.  No focal deficits reported.  Patient scheduled to follow-up with neurosurgery in the next couple weeks.  Home health is out in the home.  -Continue Keppra 1500 mg twice daily.  -Continue to monitor for any changes.  Return to ER precautions communicated to patient and .  She is having some headaches but her symptoms are improving.    Status post liver transplant Dr. Canada ([facility])   Status post liver transplant for end stage liver disease at [facility], followed by Dr. Canada in December 2011.  She has not followed up with the transplant team for the last 2 years [place]  Her liver enzymes have not been elevated on her recent labs.  She was on autograph instead of Prograf when she was in [facility] but needs to be switched back.  She is also taking CellCept.    Seizures (HCC)  This is a new problem secondary to the subdural.  Continue Keppra and lacosamide.  Follow-up with neurosurgery as scheduled.  She has not had any further seizures.  She is not driving at all.    Sarcoidosis (HCC)  Patient had been going to [facility] to the sarcoid clinic but has not had follow-up in quite some time.  She is scheduled to see pulmonology here.    Recurrent major depressive disorder, in partial remission (HCC)  Stable. Currently taking sertraline 100 mg daily as prescribed.   Denies side effects and is tolerating well.  Mood is improved with current medication and therapy.     Patient denies SI/HI.  Depression Screen (PHQ-2/PHQ-9) 8/7/2021 8/8/2021 8/18/2021   PHQ-2 Total Score 0 0 -   PHQ-2 Total Score - - -   PHQ-2 Total Score - - 0   PHQ-9 Total Score - - -           Primary pulmonary hypertension (HCC)  Patient is on oxygen 24/7.  She is due to follow-up with pulmonology in the following weeks.  She is currently on ambrisentan and tadalafil.    Pancytopenia (HCC)  Results for VICK LI (MRN [id number]) as of 8/26/2021 11:09   Ref. Range 8/16/2021 03:03   WBC Latest Ref Range: 4.8 - 10.8 K/uL 3.3 (L)   RBC Latest Ref Range: 4.20 - 5.40 M/uL 2.69 (L)   Hemoglobin Latest Ref Range: 12.0 - 16.0 g/dL 8.1 (L)   Hematocrit Latest Ref Range: 37.0 - 47.0 % 26.2 (L)   MCV Latest Ref Range: 81.4 - 97.8 fL 97.4   MCH Latest Ref Range: 27.0 - 33.0 pg 30.1   MCHC Latest Ref Range: 33.6 - 35.0 g/dL 30.9 (L)   RDW Latest Ref Range: 35.9 - 50.0 fL 56.7 (H)   Platelet Count Latest Ref Range: 164 - 446 K/uL 222   MPV Latest Ref Range: 9.0 - 12.9 fL 9.1   Neutrophils-Polys Latest Ref Range: 44.00 - 72.00 % 53.50   Neutrophils (Absolute) Latest Ref Range: 2.00 - 7.15 K/uL 1.78 (L)   Lymphocytes Latest Ref Range: 22.00 - 41.00 % 29.40   Lymphs (Absolute) Latest Ref Range: 1.00 - 4.80 K/uL 0.98 (L)   Monocytes Latest Ref Range: 0.00 - 13.40 % 10.20   Monos (Absolute) Latest Ref Range: 0.00 - 0.85 K/uL 0.34   Eosinophils Latest Ref Range: 0.00 - 6.90 % 4.80   Eos (Absolute) Latest Ref Range: 0.00 - 0.51 K/uL 0.16   Basophils Latest Ref Range: 0.00 - 1.80 % 0.90   Baso (Absolute) Latest Ref Range: 0.00 - 0.12 K/uL 0.03   Immature Granulocytes Latest Ref Range: 0.00 - 0.90 % 1.20 (H)   Immature Granulocytes (abs) Latest Ref Range: 0.00 - 0.11 K/uL 0.04   Nucleated RBC Latest Units: /100 WBC 0.00   NRBC (Absolute) Latest Units: K/uL 0.00       MGUS (monoclonal gammopathy of unknown significance)  Patient had been followed by Dr. Reza but again has not seen him in 2 years secondary to being stuck in  [facility].  She does have chronic bone pain but she is unsure if this is changed.    Closed fracture of right elbow with routine healing  Patient was just released from the hospital on the 17th.  On Saturday she slipped off of the toilet and fell and hit her elbow.  She was evaluated at [facility] and found to have a right elbow fracture.  We are waiting those records now.  She was placed in a splint.    Chronic pain syndrome  Patient will be following up with pain management    Adrenal insufficiency (HCC)  Patient has a history of adrenal insufficiency.  With her critical illness her Florinef dose was increased.  She was on medication in [place] but were unsure what that was.  She has not been seen by endocrinology yet for this.  She was on chronic steroids post transplant.    Acquired hypothyroidism  . Currently taking levothyroxine 137 mcg daily as prescribed.  Denies palpitations, skin changes, temperature intolerance, or changes in bowel habits            Health Maintenance Summary                Annual Wellness Visit Overdue 1960     Annual Pulmonary Function Test / Spirometry Overdue 8/11/2017      Done 8/11/2016 AMB PULMONARY FUNCTION TEST/LAB    IMM ZOSTER VACCINES Overdue 1/29/2019      Done 12/4/2018 Imm Admin: Zoster Vaccine Recombinant (RZV) (SHINGRIX)    MAMMOGRAM Overdue 11/19/2020      Done 11/19/2019 [id number]     Patient has more history with this topic...    COVID-19 Vaccine Overdue 5/19/2021      Done 4/21/2021 Imm Admin: Moderna SARS-CoV-2 Vaccine     Patient has more history with this topic...    IMM INFLUENZA Next Due 9/1/2021      Done 11/13/2018 Imm Admin: Influenza Vaccine Quad Inj (Pf)     Patient has more history with this topic...    IMM PNEUMOCOCCAL VACCINE: 0-64 Years Next Due 2/12/2025      Done 1/4/2017 Imm Admin: Pneumococcal polysaccharide vaccine (PPSV-23)     Patient has more history with this topic...    COLORECTAL CANCER SCREENING Next Due 3/27/2027     IMM  DTaP/Tdap/Td Vaccine Next Due 4/12/2027      Done 4/12/2017 Imm Admin: Tdap Vaccine           Annual Health Assessment Questions:     1.  Are you currently engaging in any exercise or physical activity? No    2.  How would you describe your mood or emotional well-being today? anxious    3.  Have you had any falls in the last year? Yes    4.  Have you noticed any problems with your balance or had difficulty walking? Yes    5.  In the last six months have you experienced any leakage of urine? Yes    6. DPA/Advanced Directive: Patient has Advanced Directive on file.     Current medicines (including changes today)  Current Outpatient Medications   Medication Sig Dispense Refill   • fludrocortisone (FLORINEF) 0.1 MG Tab Take 5 Tablets by mouth every day for 30 days. 150 Tablet 0   • non-formulary med Take 1 Tablet by mouth every day at 6 PM. MEDICATION FROM [facility],KALCIPOS-D FORTE 500MG/800IE  As alternative to calcium tums 500mg po     • Home Care Oxygen Inhale 3 L/min continuous. Oxygen delivered from prefered, via nasal cannula  Oxygen dose range: 3 L/min  Respiratory route via: Nasal Cannula   Oxygen supplier: Preferred         • tacrolimus (PROGRAF) 1 MG Cap Take 4 Capsules by mouth every day for 30 days. 120 Capsule 0   • ambrisentan (LETAIRIS) 10 MG tablet Take 1 Tablet by mouth every day. 30 Tablet 0   • furosemide (LASIX) 20 MG Tab Take 1 Tablet by mouth every day for 30 days. 30 Tablet 0   • gabapentin (NEURONTIN) 100 MG Cap Take 1 Capsule by mouth 2 times a day for 30 days. 60 Capsule 0   • levetiracetam (KEPPRA) 750 MG tablet Take 2 Tablets by Enteral Tube route 2 times a day for 30 days. 120 Tablet 0   • levothyroxine (SYNTHROID) 137 MCG Tab Take 1 Tablet by mouth every morning on an empty stomach. 30 Tablet 0   • mycophenolate (CELLCEPT) 250 MG Cap Take 1 Capsule by mouth 2 times a day for 30 days. 60 Capsule 0   • pravastatin (PRAVACHOL) 20 MG Tab Take 1 Tablet by mouth every day. 30 Tablet 0   •  sertraline (ZOLOFT) 100 MG Tab Take 1 Tablet by mouth every day. 30 Tablet 0   • tadalafil (CIALIS) 20 MG tablet Take 1 Tablet by mouth at bedtime for 30 days. 30 Tablet 0   • lacosamide (VIMPAT) 100 MG Tab tablet Take 1 Tablet by mouth 2 times a day for 30 days. 60 Tablet 0   • ALPRAZolam (XANAX) 1 MG Tab Take 1 mg by mouth 3 times a day as needed for Anxiety.     • metoclopramide (REGLAN) 10 MG Tab Take 10 mg by mouth as needed. NOT IN HOUSE  Indications: Nausea and Vomiting     • [facility] ESOMEPRAZOLE MAGNESIUM 40 MG PO CPDR Take 40 mg by mouth every day.     • Naloxegol Oxalate (MOVANTIK) 25 MG Tab Take 25 mg by mouth every day.     • acetaminophen (TYLENOL) 500 MG Tab Take 500 mg by mouth every 6 hours as needed for Mild Pain.     • therapeutic multivitamin-minerals (THERAGRAN-M) Tab Take 1 tablet by mouth every day.     • Calcium Carbonate (CALCIUM 500 PO) Take 1 Tablet by mouth every day. NOT TAKING AT THIS TIME     • Hyoscyamine Sulfate (HYOSCYAMINE PO) Take 0.2 mg by mouth as needed. NOT IN HOUSE     • meclizine (ANTIVERT) 12.5 MG Tab Take 25 mg by mouth as needed for Nausea/Vomiting. NOT IN HOUSE     • Prucalopride Succinate 2 MG Tab Take 2 mg by mouth every day. NOT IN HOUSE       No current facility-administered medications for this visit.       She  has a past medical history of Anemia, Anesthesia, Breath shortness, Bronchitis ( ), Cardiomegaly, Chronic fatigue and malaise, Cirrhosis ([facility]) (December 2011), CKD (chronic kidney disease) stage 3, GFR 30-59 ml/min ([facility]), Diabetes (HCC), Fracture of left foot, GERD (gastroesophageal reflux disease), H/O Clostridium difficile infection (02-17-17), Hemorrhagic disorder ([facility]), Hiatus hernia syndrome, High cholesterol, Hypothyroid, Jaundice (2009), Liver transplanted ([facility]), Low back pain (02-17-17), Mild aortic regurgitation and aortic valve sclerosis ( ), On home oxygen therapy, Platelet disorder ([facility]), Pneumonia, Psychiatric disorder, Pulmonary hypertension ([facility]),  "S/P cholecystectomy, Small bowel obstruction (HCC), Splenomegaly, and VRE (vancomycin-resistant Enterococci).    Rhubarb, Nsaids, Organic nitrates, Other drug, and Vancomycin hcl    She  reports that she has never smoked. She has never used smokeless tobacco. She reports that she does not drink alcohol and does not use drugs.  Counseling given: Not Answered  Comment: avoid all tobacco products      ROS   No chest pain, no shortness of breath, no abdominal pain.     Objective:     Physical Exam:  /60 (BP Location: Right arm, Patient Position: Sitting, BP Cuff Size: Adult)   Pulse 74   Temp 37.4 °C (99.3 °F) (Temporal)   Resp 16   Ht 1.753 m (5' 9\")   Wt 68 kg (150 lb)   SpO2 99%  Body mass index is 22.15 kg/m².   Constitutional: Alert, no distress.  Skin: Warm, dry, good turgor, no rashes in visible areas.  Eye: Equal, round and reactive, conjunctiva clear, lids normal.  Neck: Trachea midline, no masses, no thyromegaly. No cervical or supraclavicular lymphadenopathy.  Respiratory: Unlabored respiratory effort, lungs clear to auscultation, no wheezes, no rhonchi.  Cardiovascular: Normal S1, S2, no murmur, no edema.  Psych: Alert and oriented x3, normal affect and mood.    Assessment and Plan:     1. Traumatic subdural hematoma without loss of consciousness, subsequent encounter  This is a new problem.  Secondary to ground-level fall 5 weeks ago while in [facility].  Left subdural hematoma with left-to-right shift.  Left craniotomy performed 8/4/2021.  MMA procedure performed 8/12/2021.  Repeat head CT was stable.  No focal deficits reported.  Patient scheduled to follow-up with neurosurgery in the next couple weeks.  Home health is out in the home.  -Continue Keppra 1500 mg twice daily.  -Continue to monitor for any changes.  Return to ER precautions communicated to patient and .  She is having some headaches but her symptoms are improving.    2. Thrombocytopenia (HCC)  Patient has now developed " pancytopenia.  Refer to hematology for further evaluation and treatment    3. Steroid-induced hyperglycemia  Continue to monitor closely.    4. Status post liver transplant Dr. Canada ([facility])  Refer to [facility] and follow-up with the transplant team.    [place] [facility]. Canada in December 2011.  She has not followed up with the transplant team for the last 2 years [place]  Her liver enzymes have not been elevated on her recent labs.  She was on autograph instead of Prograf when she was in [facility] but needs to be switched back.  She is also taking CellCept.  We will refill her Prograf and CellCept until she can be seen in [place].  - REFERRAL TO OTHER    5. Sarcoidosis (HCC)  She is scheduled to follow-up with pulmonology.  Continue oxygen therapy    6. Recurrent major depressive disorder, in partial remission (HCC)  Amazingly patient is fairly stable even with her severe medical problems.  Continue taking sertraline 100 mg daily as prescribed.   Denies side effects and is tolerating well.  Mood is improved with current medication and therapy.    Patient denies SI/HI.  Depression Screen (PHQ-2/PHQ-9) 8/7/2021 8/8/2021 8/18/2021   PHQ-2 Total Score 0 0 -   PHQ-2 Total Score - - -   PHQ-2 Total Score - - 0   PHQ-9 Total Score - - -       7.  Primary pulmonary hypertension (HCC)  Patient is on oxygen 24/7.  She is due to follow-up with pulmonology in the following weeks.  She is currently on ambrisentan and tadalafil.  We will refill medications until she can be seen.  The ambrisentan is cost prohibitive and she had previously been on a caleb.  We will ask  to consult to help with this.    8. MGUS (monoclonal gammopathy of unknown significance)  Check SPEP.  Refer to hematology oncology for further evaluation and treatment  - SPEP W/REFLEX TO PATRICE UNGER G, M; Future  - REFERRAL  TO HEMATOLOGY ONCOLOGY    9. Interstitial lung disease (HCC)  Patient is on oxygen 24/7.  She is due to follow-up with pulmonology in the following weeks.    10. Immunocompromised state (HCC)  Refer to [facility] and follow-up with the transplant team.    Status post liver transplant for end stage liver disease at [facility], followed by Dr. Canada in December 2011.  She has not followed up with the transplant team for the last 2 years as [place]  Her liver enzymes have not been elevated on her recent labs.  She was on autograph instead of Prograf when she was in [place] but needs to be switched back.  She is also taking CellCept.  We will refill her Prograf and CellCept until she can be seen in [place].  Patient should proceed with her third dose of the [place] Covid vaccine as she is immunocompromised  - REFERRAL TO OTHER    11. Hepatopulmonary syndrome (HCC)  Refer to [facility] and follow-up with the transplant team.    Status post liver transplant for end stage liver disease at [facility], followed by Dr. Canada in December 2011.  She has not followed up with the transplant team for the last 2 years as [place] was [place].  Her liver enzymes have not been elevated on her recent labs.  She was on autograph instead of Prograf when she was in [place] but needs to be switched back.  She is also taking CellCept.  We will refill her Prograf and CellCept until she can be seen in [place].  - REFERRAL TO OTHER    12. Chronic respiratory failure with hypoxia (HCC)  Continue oxygen therapy and follow-up with pulmonology as scheduled    13. Adrenal insufficiency (HCC)  This is a new problem.  Discussed that her Florinef dose was increased because of the stress she was under from her critical illness.  We will refer her to endocrinology for further management and weaning of the dose.  - REFERRAL TO ENDOCRINOLOGY    14. Seizures  (HCC)  Is a new problem.  Patient has not had another seizure since her craniotomy.  Continue Keppra and lacosamide.  Follow-up with neurosurgeon as scheduled    15. Acquired hypothyroidism  Stable. Currently taking levothyroxine 137 mcg daily as prescribed.  Denies palpitations, skin changes, temperature intolerance, or changes in bowel habits    16. Chronic pain syndrome  Follow-up with pain management as scheduled    17. Closed fracture of right elbow with routine healing  This is a new problem.  Follow with orthopedics as scheduled    18. High risk medication use  Continue to monitor labs    19. Pancytopenia (HCC)  This could be from her critical illness but we will refer her to hematology for further evaluation treatment      Discussion today about general wellness and lifestyle habits:    · Engage in regular physical activity and social activities.  · Prevent falls and reduce trip hazards; using ambulatory aides, hearing and vision testing if appropriate.  · Steps to improve urinary incontinence.  · Advanced care planning.    Follow-Up: Return in about 3 months (around 11/23/2021).         PLEASE NOTE: This dictation was created using voice recognition software. I have made every reasonable attempt to correct obvious errors, but I expect that there are errors of grammar and possibly content that I did not discover before finalizing the note.

## 2021-08-23 NOTE — PROGRESS NOTES
Face to Face Supporting Documentation - Home Health    The encounter with this patient was in whole or in part the primary reason for home health admission.    Date of encounter:   Patient:                    MRN:                       YOB: 2021  Roxana Cuba  [id number]  1960     Home health to see patient for:  Skilled Nursing care for assessment, interventions & education, Medical social work consult, Home health aide and Physical Therapy evaluation and treatment    Skilled need for:  Exacerbation of Chronic Disease State Worsening chronic respiratory failure, Surgical Aftercare Craniotomy and New Onset Medical Diagnosis Subdural hematoma and elbow fracture    Skilled nursing interventions to include:  Comment: Monitoring of above conditions    Homebound status evidenced by:  Need the aid of supportive devices such as crutches, canes, wheelchairs or walkers, Require the use of special transportation or Needs the assistance of another person in order to leave the home. Leaving home requires a considerable and taxing effort. There is a normal inability to leave the home.    Community Physician to provide follow up care: Elisa Phillip M.D.     Optional Interventions? No      I certify the face to face encounter for this home health care referral meets the CMS requirements and the encounter/clinical assessment with the patient was, in whole, or in part, for the medical condition(s) listed above, which is the primary reason for home health care. Based on my clinical findings: the service(s) are medically necessary, support the need for home health care, and the homebound criteria are met.  I certify that this patient has had a face to face encounter by myself.  Elisa Phillip M.D. - NPI: [id number]

## 2021-08-23 NOTE — CASE COMMUNICATION
noted  ----- Message -----  From: Be Holland, PT  Sent: 8/22/2021  10:12 PM PDT  To: Radha Pollard R.N.      Phone calls placed to schedule initial visit 08/19 and 08/20, patient did not answer.  PT left voicemails but did not receive any return call.  PT will request delay in PT order for next week.

## 2021-08-24 ENCOUNTER — TELEPHONE (OUTPATIENT)
Dept: MEDICAL GROUP | Facility: LAB | Age: 61
End: 2021-08-24

## 2021-08-25 ENCOUNTER — HOME CARE VISIT (OUTPATIENT)
Dept: HOME HEALTH SERVICES | Facility: HOME HEALTHCARE | Age: 61
End: 2021-08-25
Payer: MEDICARE

## 2021-08-25 VITALS
OXYGEN SATURATION: 98 % | TEMPERATURE: 97.6 F | SYSTOLIC BLOOD PRESSURE: 120 MMHG | DIASTOLIC BLOOD PRESSURE: 68 MMHG | RESPIRATION RATE: 18 BRPM | HEART RATE: 74 BPM

## 2021-08-25 PROCEDURE — G0299 HHS/HOSPICE OF RN EA 15 MIN: HCPCS

## 2021-08-25 ASSESSMENT — ACTIVITIES OF DAILY LIVING (ADL): OASIS_M1830: 05

## 2021-08-25 ASSESSMENT — ENCOUNTER SYMPTOMS
MUSCLE WEAKNESS: 1
PAIN LOCATION - PAIN QUALITY: DULL, ACHY
PAIN SEVERITY GOAL: 0/10
PAIN: 1
PERSON REPORTING PAIN: PATIENT
PAIN LOCATION - EXACERBATING FACTORS: MOVEMENT
HIGHEST PAIN SEVERITY IN PAST 24 HOURS: 6/10
LOWEST PAIN SEVERITY IN PAST 24 HOURS: 4/10
SUBJECTIVE PAIN PROGRESSION: UNCHANGED
NAUSEA: DENIES
PAIN LOCATION - PAIN FREQUENCY: FREQUENT
PAIN LOCATION: RIGHT ELBOW
VOMITING: DENIES
PAIN LOCATION - PAIN SEVERITY: 4/10
PAIN LOCATION - RELIEVING FACTORS: PAIN MEDICATION

## 2021-08-25 NOTE — CASE COMMUNICATION
Quality Review for 8.18.21 SOC OASIS performed on by MERVAT Romo RN on 8.25, 2021:    Edits completed by MERVAT Romo RN:  1. Added 02 safety template to MAR  2. Changed  to 8.17.21, date of valid referral  3. Changed  to 8.17.21 per Epic  4. Added [facility] referral for COPD  5. Changed flu question to na, it is not flu season. Changed pneumonia vaccine to yes per Epic pt has rec'd  6. Changed  to 1 per functional limitation charting on endurance  7. Changed , , ,  to 2,  to 1,  to 2,  to 3, [id number] C,E,F,G,H to 4, [id number] A-F, I,J,P to 4 per narrative and HB status reporting weakness and fatigue, freq falls, poor ambulation and unsteady gait, pt would need supervision and DME tpo safely perform. Changed  to 5 pt would need showering DME to safely perform  8. Changed  to 3 per ambulation score   9. Changed  to 1  10. Added ambulate only with assistance, correct use of support devices, phone access, fall risk and high risk medication precautions to safety measures  11.  Updated F2F data  12. Updated care plan added intervention to the goals without any. Resolved 02 safety on care plan and scheduled for each visit. Scheduled out intervention on the care plan.

## 2021-08-26 ENCOUNTER — HOME CARE VISIT (OUTPATIENT)
Dept: HOME HEALTH SERVICES | Facility: HOME HEALTHCARE | Age: 61
End: 2021-08-26
Payer: MEDICARE

## 2021-08-26 VITALS
RESPIRATION RATE: 18 BRPM | OXYGEN SATURATION: 94 % | SYSTOLIC BLOOD PRESSURE: 120 MMHG | HEART RATE: 86 BPM | TEMPERATURE: 98.2 F | DIASTOLIC BLOOD PRESSURE: 65 MMHG

## 2021-08-26 PROCEDURE — G0151 HHCP-SERV OF PT,EA 15 MIN: HCPCS

## 2021-08-26 RX ORDER — HYDROCODONE BITARTRATE AND ACETAMINOPHEN 10; 325 MG/1; MG/1
1 TABLET ORAL
COMMUNITY
Start: 2021-08-23 | End: 2021-09-11

## 2021-08-26 ASSESSMENT — ACTIVITIES OF DAILY LIVING (ADL)
AMBULATION ASSISTANCE ON FLAT SURFACES: 1
CURRENT_FUNCTION: STAND BY ASSIST
CURRENT_FUNCTION: ONE PERSON

## 2021-08-26 ASSESSMENT — ENCOUNTER SYMPTOMS
HIGHEST PAIN SEVERITY IN PAST 24 HOURS: 9/10
PAIN SEVERITY GOAL: 0/10
PAIN: 1
MUSCLE WEAKNESS: 1
PAIN LOCATION - PAIN SEVERITY: 6/10
PAIN LOCATION: HEAD
SUBJECTIVE PAIN PROGRESSION: UNCHANGED
PAIN LOCATION: RIGHT ELBOW
PAIN LOCATION - PAIN SEVERITY: 8/10
LIMITED RANGE OF MOTION: 1
LOWEST PAIN SEVERITY IN PAST 24 HOURS: 6/10
PERSON REPORTING PAIN: PATIENT

## 2021-08-26 NOTE — ASSESSMENT & PLAN NOTE
Patient had been followed by Dr. Reza but again has not seen him in 2 years secondary to being stuck in [facility].  She does have chronic bone pain but she is unsure if this is changed.

## 2021-08-26 NOTE — ASSESSMENT & PLAN NOTE
. Currently taking levothyroxine 137 mcg daily as prescribed.  Denies palpitations, skin changes, temperature intolerance, or changes in bowel habits

## 2021-08-26 NOTE — ASSESSMENT & PLAN NOTE
This is a new problem secondary to the subdural.  Continue Keppra and lacosamide.  Follow-up with neurosurgery as scheduled.  She has not had any further seizures.  She is not driving at all.

## 2021-08-26 NOTE — ASSESSMENT & PLAN NOTE
Patient has a history of adrenal insufficiency.  With her critical illness her Florinef dose was increased.  She was on medication in [place] but were unsure what that was.  She has not been seen by endocrinology yet for this.  She was on chronic steroids post transplant.

## 2021-08-26 NOTE — ASSESSMENT & PLAN NOTE
Stable. Currently taking sertraline 100 mg daily as prescribed.   Denies side effects and is tolerating well.  Mood is improved with current medication and therapy.    Patient denies SI/HI.  Depression Screen (PHQ-2/PHQ-9) 8/7/2021 8/8/2021 8/18/2021   PHQ-2 Total Score 0 0 -   PHQ-2 Total Score - - -   PHQ-2 Total Score - - 0   PHQ-9 Total Score - - -

## 2021-08-26 NOTE — ASSESSMENT & PLAN NOTE
Patient is on oxygen 24/7.  She is due to follow-up with pulmonology in the following weeks.  She is currently on ambrisentan and tadalafil.

## 2021-08-26 NOTE — ASSESSMENT & PLAN NOTE
Patient was just released from the hospital on the 17th.  On Saturday she slipped off of the toilet and fell and hit her elbow.  She was evaluated at [facility] and found to have a right elbow fracture.  We are waiting those records now.  She was placed in a splint.

## 2021-08-26 NOTE — ASSESSMENT & PLAN NOTE
Secondary to ground-level fall 5 weeks ago while in [facility].  Left subdural hematoma with left-to-right shift.  Left craniotomy performed 8/4/2021.  MMA procedure performed 8/12/2021.  Repeat head CT was stable.  No focal deficits reported.  Patient scheduled to follow-up with neurosurgery in the next couple weeks.  Home health is out in the home.  -Continue Keppra 1500 mg twice daily.  -Continue to monitor for any changes.  Return to ER precautions communicated to patient and .  She is having some headaches but her symptoms are improving.

## 2021-08-26 NOTE — CASE COMMUNICATION
Falls Template         Date & Time of fall: 8/21/21 appx 1 pm           Cause of fall:  Physiological           Location:  bathroom, getting up from toilet set, lost balance, and  fell           Was the fall witnessed?                  If yes, by who? no            Actions taken by patient: patient called  to assist her off the floor            Any new injury?                   If yes, what kind? yes, right elbow fracture, pt states 'we went to urgent care after the fall, xray was done,  cast applied            Any recent medication changes?    No            Reviewed Post Fall Questionnaire:  Yes                 Post fall instructions:  instructed to ask  for assistance with all transfers,install grab bars, call pmd, agency for any changes in condition, verbalized undersanding            Actions Taken: Notified care team, Notified MD, Informed RN supervisor to schedule follow-up visit and Reran medication interactions and sent to pharmacy

## 2021-08-26 NOTE — ASSESSMENT & PLAN NOTE
Status post liver transplant for end stage liver disease at [facility], followed by Dr. Canada in December 2011.  She has not followed up with the transplant team for the last 2 years as [place].  Her liver enzymes have not been elevated on her recent labs.  She was on autograph instead of Prograf when she was in [facility] but needs to be switched back.  She is also taking CellCept.

## 2021-08-26 NOTE — ASSESSMENT & PLAN NOTE
Results for VICK LI (MRN [id number]) as of 8/26/2021 11:09   Ref. Range 8/16/2021 03:03   WBC Latest Ref Range: 4.8 - 10.8 K/uL 3.3 (L)   RBC Latest Ref Range: 4.20 - 5.40 M/uL 2.69 (L)   Hemoglobin Latest Ref Range: 12.0 - 16.0 g/dL 8.1 (L)   Hematocrit Latest Ref Range: 37.0 - 47.0 % 26.2 (L)   MCV Latest Ref Range: 81.4 - 97.8 fL 97.4   MCH Latest Ref Range: 27.0 - 33.0 pg 30.1   MCHC Latest Ref Range: 33.6 - 35.0 g/dL 30.9 (L)   RDW Latest Ref Range: 35.9 - 50.0 fL 56.7 (H)   Platelet Count Latest Ref Range: 164 - 446 K/uL 222   MPV Latest Ref Range: 9.0 - 12.9 fL 9.1   Neutrophils-Polys Latest Ref Range: 44.00 - 72.00 % 53.50   Neutrophils (Absolute) Latest Ref Range: 2.00 - 7.15 K/uL 1.78 (L)   Lymphocytes Latest Ref Range: 22.00 - 41.00 % 29.40   Lymphs (Absolute) Latest Ref Range: 1.00 - 4.80 K/uL 0.98 (L)   Monocytes Latest Ref Range: 0.00 - 13.40 % 10.20   Monos (Absolute) Latest Ref Range: 0.00 - 0.85 K/uL 0.34   Eosinophils Latest Ref Range: 0.00 - 6.90 % 4.80   Eos (Absolute) Latest Ref Range: 0.00 - 0.51 K/uL 0.16   Basophils Latest Ref Range: 0.00 - 1.80 % 0.90   Baso (Absolute) Latest Ref Range: 0.00 - 0.12 K/uL 0.03   Immature Granulocytes Latest Ref Range: 0.00 - 0.90 % 1.20 (H)   Immature Granulocytes (abs) Latest Ref Range: 0.00 - 0.11 K/uL 0.04   Nucleated RBC Latest Units: /100 WBC 0.00   NRBC (Absolute) Latest Units: K/uL 0.00

## 2021-08-26 NOTE — ASSESSMENT & PLAN NOTE
Patient had been going to [facility] to the sarcoid clinic but has not had follow-up in quite some time.  She is scheduled to see pulmonology here.

## 2021-08-27 ENCOUNTER — HOME CARE VISIT (OUTPATIENT)
Dept: HOME HEALTH SERVICES | Facility: HOME HEALTHCARE | Age: 61
End: 2021-08-27
Payer: MEDICARE

## 2021-08-27 ENCOUNTER — HOSPITAL ENCOUNTER (OUTPATIENT)
Dept: RADIOLOGY | Facility: MEDICAL CENTER | Age: 61
End: 2021-08-27
Attending: PHYSICIAN ASSISTANT
Payer: MEDICARE

## 2021-08-27 DIAGNOSIS — S06.5X0A SUBDURAL HEMORRHAGE FOLLOWING INJURY WITH OPEN INTRACRANIAL WOUND AND NO LOSS OF CONSCIOUSNESS, INITIAL ENCOUNTER (HCC): ICD-10-CM

## 2021-08-27 DIAGNOSIS — S01.90XA SUBDURAL HEMORRHAGE FOLLOWING INJURY WITH OPEN INTRACRANIAL WOUND AND NO LOSS OF CONSCIOUSNESS, INITIAL ENCOUNTER (HCC): ICD-10-CM

## 2021-08-27 PROCEDURE — 70450 CT HEAD/BRAIN W/O DYE: CPT | Mod: MH

## 2021-08-30 ENCOUNTER — HOME CARE VISIT (OUTPATIENT)
Dept: HOME HEALTH SERVICES | Facility: HOME HEALTHCARE | Age: 61
End: 2021-08-30
Payer: MEDICARE

## 2021-08-30 PROCEDURE — G0180 MD CERTIFICATION HHA PATIENT: HCPCS | Performed by: FAMILY MEDICINE

## 2021-08-30 NOTE — CASE COMMUNICATION
I agree with changes  ----- Message -----  From: Vicki Romo R.N.  Sent: 8/25/2021  10:39 AM PDT  To: Anna Castro R.N.      Quality Review for 8.18.21 SOC OASIS performed on by MERVAT Romo RN on 8.25, 2021:    Edits completed by MERVAT Romo RN:  1. Added 02 safety template to MAR  2. Changed  to 8.17.21, date of valid referral  3. Changed  to 8.17.21 per Epic  4. Added [facility] referral for COPD  5. Changed flu question to na, it is not flu season. Changed pneumonia vaccine to yes per Epic pt has rec'd  6. Changed  to 1 per functional limitation charting on endurance  7. Changed , , ,  to 2,  to 1,  to 2,  to 3, [id number] C,E,F,G,H to 4, [id number] A-F, I,J,P to 4 per narrative and HB status reporting weakness and fatigue, freq falls, poor ambulation and unsteady gait, pt would need supervision and DME tpo safely perform. Changed  to 5 pt would need showering DME to safely perform  8. Changed  to 3 per ambulation score   9. Changed  to 1  10. Added ambulate only with assistance, correct use of support devices, phone access, fall risk and high risk medication precautions to safety measures  11.  Updated F2F data  12. Updated care plan added intervention to the goals without any. Resolved 02 safety on care plan and scheduled for each visit. Scheduled out intervention on the care plan.

## 2021-09-02 ENCOUNTER — HOME CARE VISIT (OUTPATIENT)
Dept: HOME HEALTH SERVICES | Facility: HOME HEALTHCARE | Age: 61
End: 2021-09-02
Payer: MEDICARE

## 2021-09-02 NOTE — Clinical Note
This SN tried to schedule a visit for today with patient last night, no answer on phone.  I called patient again this am with no answer and then called patient .  I asked if patient would like to continue with HH and  said yes, he thinks his wife needs HH and nursing at this time.  When trying to confirm apt for this am  refused and said his wife has 2 Dr. apt today back to back and they would like to re schedule to next week.  Missed visit documented for today.

## 2021-09-03 NOTE — CASE COMMUNICATION
noted  ----- Message -----  From: Anna Castro R.N.  Sent: 9/2/2021   9:43 AM PDT  To: Radha Pollard R.N.      This SN tried to schedule a visit for today with patient last night, no answer on phone.  I called patient again this am with no answer and then called patient .  I asked if patient would like to continue with HH and  said yes, he thinks his wife needs HH and nursing at this time.  When trying to confirm apt for this am  refused and said his wife has 2 Dr. apt today back to back and they would like to re schedule to next week.  Missed visit documented for today.

## 2021-09-08 ENCOUNTER — HOME CARE VISIT (OUTPATIENT)
Dept: HOME HEALTH SERVICES | Facility: HOME HEALTHCARE | Age: 61
End: 2021-09-08
Payer: MEDICARE

## 2021-09-08 ENCOUNTER — TELEPHONE (OUTPATIENT)
Dept: MEDICAL GROUP | Facility: LAB | Age: 61
End: 2021-09-08

## 2021-09-08 VITALS
RESPIRATION RATE: 18 BRPM | OXYGEN SATURATION: 94 % | DIASTOLIC BLOOD PRESSURE: 78 MMHG | TEMPERATURE: 97.9 F | HEART RATE: 76 BPM | SYSTOLIC BLOOD PRESSURE: 126 MMHG

## 2021-09-08 DIAGNOSIS — R56.9 SEIZURES (HCC): ICD-10-CM

## 2021-09-08 DIAGNOSIS — S06.5X0A TRAUMATIC SUBDURAL HEMATOMA WITHOUT LOSS OF CONSCIOUSNESS, INITIAL ENCOUNTER (HCC): ICD-10-CM

## 2021-09-08 PROCEDURE — G0495 RN CARE TRAIN/EDU IN HH: HCPCS

## 2021-09-08 ASSESSMENT — ENCOUNTER SYMPTOMS
PAIN LOCATION: RIGHT ARM
LIMITED RANGE OF MOTION: 1
PAIN LOCATION - RELIEVING FACTORS: PAIN MED, REST
NAUSEA: DENIES
PAIN LOCATION - PAIN QUALITY: THROBS
ASSOCIATED SYMPTOMS: DENIES
PAIN LOCATION - PAIN FREQUENCY: FREQUENT
VOMITING: DENIES
LOWEST PAIN SEVERITY IN PAST 24 HOURS: 8/10
PERSON REPORTING PAIN: PATIENT
HIGHEST PAIN SEVERITY IN PAST 24 HOURS: 9/10
PAIN: 1
PAIN SEVERITY GOAL: 0/10
MUSCLE WEAKNESS: 1
PAIN LOCATION - EXACERBATING FACTORS: MOVEMENT
SUBJECTIVE PAIN PROGRESSION: UNCHANGED
PAIN LOCATION - PAIN SEVERITY: 8/10

## 2021-09-08 ASSESSMENT — ACTIVITIES OF DAILY LIVING (ADL)
AMBULATION ASSISTANCE: ONE PERSON
CURRENT_FUNCTION: ONE PERSON

## 2021-09-08 NOTE — TELEPHONE ENCOUNTER
Phone Number Called: [phone] (home)     Call outcome: Did not leave a detailed message. Requested patient to call back.    Message: Left message requesting return call regarding message received from home health RN.

## 2021-09-08 NOTE — TELEPHONE ENCOUNTER
Maye CARDOSO [facility] called.  States that Roxana has a new hematoma on her incision site.  She is also having extreme pain on the right elbow.     I can ask Roxana to schedule an appointment with you, or would you like her to go to the Urgent Care?

## 2021-09-08 NOTE — Clinical Note
"Rae pls fax to Dr Clayton Redd   pt in bed . alert and roiented x4, states she is forgetful after surgery. on continous oxygen at 3l per nasal cannula. . right arm on sling/brace. fingers warm to touch , good capillary refill. moving well. reported of 8-9/10 pain . pt had taken pain med around 9:30am this morning..  managed medications. states he will call md later for refill of keppraharpalpat. pt reported that she noticed some \"raised area to left side of head near old surgery site.  noted left side of head , parietal,  some raised or hematoma  around 2.5 cm x1.5,  closed to old incison site which dry healed, open to air. pt denies headachess, dizziness,n/v. . pt instructed that she needs to go to ER , if this\" raised area \"on left side of head  is new to be checked or to call 911 in case change of conditon that requires immeidate medical attention.  pt said she didnt want to go to ER and will observe .  pt also instrcuted to make appt with neurologist/surgeon, pain doctor and GI MD. states she will call GI in [place]. teaching on pain management, safety/fall precautions provided. this SN called Dr Roth's office reported about hematoma (new to pt as reported) and pain level of 8-9/10 to right arm.. no apparent distress noted. . Pt/Cg response to the services provided: denies respiratory distress, siezures, falls   "

## 2021-09-08 NOTE — TELEPHONE ENCOUNTER
Are we talking a new hematoma on her head?  If that is the case she needs to contact the neurosurgeon.  As far as the elbow she should contact the orthopedist.  If the hematomas on her head she should go the emergency room.  Elisa Phillip M.D.

## 2021-09-09 ENCOUNTER — HOSPITAL ENCOUNTER (EMERGENCY)
Facility: MEDICAL CENTER | Age: 61
End: 2021-09-09
Payer: MEDICARE

## 2021-09-09 VITALS
DIASTOLIC BLOOD PRESSURE: 78 MMHG | RESPIRATION RATE: 19 BRPM | SYSTOLIC BLOOD PRESSURE: 125 MMHG | HEART RATE: 72 BPM | WEIGHT: 150 LBS | HEIGHT: 69 IN | TEMPERATURE: 98 F | BODY MASS INDEX: 22.22 KG/M2 | OXYGEN SATURATION: 100 %

## 2021-09-09 PROCEDURE — 302449 STATCHG TRIAGE ONLY (STATISTIC)

## 2021-09-09 RX ORDER — SERTRALINE HYDROCHLORIDE 100 MG/1
100 TABLET, FILM COATED ORAL DAILY
Qty: 30 TABLET | Refills: 0 | Status: SHIPPED | OUTPATIENT
Start: 2021-09-09 | End: 2021-10-05

## 2021-09-09 RX ORDER — ANTACID TABLETS 500 MG/1
500 TABLET, CHEWABLE ORAL DAILY
Qty: 90 TABLET | Refills: 3 | Status: SHIPPED | OUTPATIENT
Start: 2021-09-09 | End: 2021-09-10

## 2021-09-09 RX ORDER — LACOSAMIDE 100 MG/1
100 TABLET ORAL 2 TIMES DAILY
Qty: 60 TABLET | Refills: 0 | Status: SHIPPED | OUTPATIENT
Start: 2021-09-09 | End: 2021-10-11 | Stop reason: SDUPTHER

## 2021-09-09 RX ORDER — TACROLIMUS 1 MG/1
4 CAPSULE ORAL DAILY
Qty: 120 CAPSULE | Refills: 0 | Status: SHIPPED | OUTPATIENT
Start: 2021-09-09 | End: 2021-10-05

## 2021-09-09 RX ORDER — LEVETIRACETAM 750 MG/1
1500 TABLET ORAL 2 TIMES DAILY
Qty: 120 TABLET | Refills: 0 | Status: SHIPPED | OUTPATIENT
Start: 2021-09-09 | End: 2021-09-20 | Stop reason: SDUPTHER

## 2021-09-09 ASSESSMENT — FIBROSIS 4 INDEX: FIB4 SCORE: 1.04

## 2021-09-09 NOTE — TELEPHONE ENCOUNTER
Received request via: Patient    Was the patient seen in the last year in this department? Yes   LOV: 08/2021    Does the patient have an active prescription (recently filled or refills available) for medication(s) requested? No    Spoke with patient's spouse. Hematoma is on incision site on head, advised ER evaluation per PCP recommendation. Spouse agreeable. He also states he is nearly out of several medications for patient. Refills pended. Spouse wanting to verify with PCP that current Keppra dose is appropriate and should be continued.

## 2021-09-09 NOTE — ED TRIAGE NOTES
"Pt to triage via w/c c/o sz last night hitting her head abrasion noted to back of head. Pt also c/o \"bubbling\" area around incision from recent craniotomy and HA. Pt A&Ox4. vss   "

## 2021-09-09 NOTE — CASE COMMUNICATION
"noted  ----- Message -----  From: Stacie Morfin R.N.  Sent: 9/8/2021   4:27 PM PDT  To: [facility] fax to Dr Clayton Redd   pt in bed . alert and roiented x4, states she is forgetful after surgery. on continous oxygen at 3l per nasal cannula. . right arm on sling/brace. fingers warm to touch , good capillary refill. moving well. reported of 8-9/10 pain . pt had taken pain med around 9:30am this morning..  managed medications. states he will call md later for refill of keppra,vimpat. pt reported that she noticed some \"raised area to left side of head near old surgery site.  noted left side of head , parietal,  some raised or hematoma  around 2.5 cm x1.5,  closed to old incison site which dry healed, open to air. pt denies headachess, dizziness,n/v. . pt instructed that she needs to go to ER , if this\" raised area \"on left side of head  is new to be checked or to call 911 in case change of conditon that requires immeidate medical attention.  pt said she didnt want to go to ER and will observe .  pt also instrcuted to make appt with neurologist/surgeon, pain doctor and GI MD. states she will call GI in [place]. teaching on pain management, safety/fall precautions provided. this SN called Dr Roth's office reported about hematoma (new to pt as reported) and pain level of 8-9/10 to right arm.. no apparent distress noted. . Pt/Cg response to the services provided: denies respiratory distress, siezures, falls   "

## 2021-09-10 ENCOUNTER — APPOINTMENT (OUTPATIENT)
Dept: RADIOLOGY | Facility: MEDICAL CENTER | Age: 61
DRG: 641 | End: 2021-09-10
Attending: EMERGENCY MEDICINE
Payer: MEDICARE

## 2021-09-10 ENCOUNTER — HOME CARE VISIT (OUTPATIENT)
Dept: HOME HEALTH SERVICES | Facility: HOME HEALTHCARE | Age: 61
End: 2021-09-10
Payer: MEDICARE

## 2021-09-10 ENCOUNTER — HOSPITAL ENCOUNTER (INPATIENT)
Facility: MEDICAL CENTER | Age: 61
LOS: 5 days | DRG: 641 | End: 2021-09-15
Attending: EMERGENCY MEDICINE | Admitting: STUDENT IN AN ORGANIZED HEALTH CARE EDUCATION/TRAINING PROGRAM
Payer: MEDICARE

## 2021-09-10 DIAGNOSIS — E87.6 HYPOKALEMIA: ICD-10-CM

## 2021-09-10 DIAGNOSIS — G89.4 CHRONIC PAIN SYNDROME: ICD-10-CM

## 2021-09-10 DIAGNOSIS — J96.11 CHRONIC RESPIRATORY FAILURE WITH HYPOXIA (HCC): ICD-10-CM

## 2021-09-10 DIAGNOSIS — D69.6 THROMBOCYTOPENIA (HCC): ICD-10-CM

## 2021-09-10 DIAGNOSIS — S42.401D CLOSED FRACTURE OF RIGHT ELBOW WITH ROUTINE HEALING: ICD-10-CM

## 2021-09-10 DIAGNOSIS — D64.9 ANEMIA, UNSPECIFIED TYPE: ICD-10-CM

## 2021-09-10 DIAGNOSIS — S06.5X0D TRAUMATIC SUBDURAL HEMATOMA WITHOUT LOSS OF CONSCIOUSNESS, SUBSEQUENT ENCOUNTER: ICD-10-CM

## 2021-09-10 DIAGNOSIS — E83.51 HYPOCALCEMIA: ICD-10-CM

## 2021-09-10 DIAGNOSIS — A04.5 CAMPYLOBACTER GASTROENTERITIS: ICD-10-CM

## 2021-09-10 LAB
ANION GAP SERPL CALC-SCNC: 17 MMOL/L (ref 7–16)
BUN SERPL-MCNC: 8 MG/DL (ref 8–22)
CALCIUM SERPL-MCNC: 6.8 MG/DL (ref 8.4–10.2)
CHLORIDE SERPL-SCNC: 99 MMOL/L (ref 96–112)
CO2 SERPL-SCNC: 31 MMOL/L (ref 20–33)
CREAT SERPL-MCNC: 0.52 MG/DL (ref 0.5–1.4)
EKG IMPRESSION: NORMAL
GLUCOSE SERPL-MCNC: 83 MG/DL (ref 65–99)
POTASSIUM SERPL-SCNC: 2.1 MMOL/L (ref 3.6–5.5)
SODIUM SERPL-SCNC: 147 MMOL/L (ref 135–145)

## 2021-09-10 PROCEDURE — 99223 1ST HOSP IP/OBS HIGH 75: CPT | Mod: AI | Performed by: STUDENT IN AN ORGANIZED HEALTH CARE EDUCATION/TRAINING PROGRAM

## 2021-09-10 PROCEDURE — 700111 HCHG RX REV CODE 636 W/ 250 OVERRIDE (IP): Performed by: STUDENT IN AN ORGANIZED HEALTH CARE EDUCATION/TRAINING PROGRAM

## 2021-09-10 PROCEDURE — 700101 HCHG RX REV CODE 250: Performed by: STUDENT IN AN ORGANIZED HEALTH CARE EDUCATION/TRAINING PROGRAM

## 2021-09-10 PROCEDURE — 770020 HCHG ROOM/CARE - TELE (206)

## 2021-09-10 PROCEDURE — 70450 CT HEAD/BRAIN W/O DYE: CPT | Mod: MG

## 2021-09-10 PROCEDURE — 700102 HCHG RX REV CODE 250 W/ 637 OVERRIDE(OP): Performed by: STUDENT IN AN ORGANIZED HEALTH CARE EDUCATION/TRAINING PROGRAM

## 2021-09-10 PROCEDURE — 700102 HCHG RX REV CODE 250 W/ 637 OVERRIDE(OP): Performed by: EMERGENCY MEDICINE

## 2021-09-10 PROCEDURE — 80048 BASIC METABOLIC PNL TOTAL CA: CPT

## 2021-09-10 PROCEDURE — 99285 EMERGENCY DEPT VISIT HI MDM: CPT

## 2021-09-10 PROCEDURE — A9270 NON-COVERED ITEM OR SERVICE: HCPCS | Performed by: EMERGENCY MEDICINE

## 2021-09-10 PROCEDURE — 93005 ELECTROCARDIOGRAM TRACING: CPT | Performed by: EMERGENCY MEDICINE

## 2021-09-10 PROCEDURE — 85025 COMPLETE CBC W/AUTO DIFF WBC: CPT

## 2021-09-10 PROCEDURE — 80500 HCHG CLINICAL PATH CONSULT-LIMITED: CPT

## 2021-09-10 PROCEDURE — A9270 NON-COVERED ITEM OR SERVICE: HCPCS | Performed by: STUDENT IN AN ORGANIZED HEALTH CARE EDUCATION/TRAINING PROGRAM

## 2021-09-10 PROCEDURE — 80177 DRUG SCRN QUAN LEVETIRACETAM: CPT

## 2021-09-10 PROCEDURE — 73090 X-RAY EXAM OF FOREARM: CPT | Mod: RT

## 2021-09-10 RX ORDER — HYDROCODONE BITARTRATE AND ACETAMINOPHEN 5; 325 MG/1; MG/1
1 TABLET ORAL ONCE
Status: COMPLETED | OUTPATIENT
Start: 2021-09-10 | End: 2021-09-10

## 2021-09-10 RX ORDER — CLONIDINE HYDROCHLORIDE 0.1 MG/1
0.1 TABLET ORAL EVERY 6 HOURS PRN
Status: DISCONTINUED | OUTPATIENT
Start: 2021-09-10 | End: 2021-09-15 | Stop reason: HOSPADM

## 2021-09-10 RX ORDER — LEVETIRACETAM 500 MG/1
1500 TABLET ORAL 2 TIMES DAILY
Status: DISCONTINUED | OUTPATIENT
Start: 2021-09-10 | End: 2021-09-13

## 2021-09-10 RX ORDER — PROMETHAZINE HYDROCHLORIDE 25 MG/1
12.5-25 TABLET ORAL EVERY 4 HOURS PRN
Status: DISCONTINUED | OUTPATIENT
Start: 2021-09-10 | End: 2021-09-10

## 2021-09-10 RX ORDER — LACOSAMIDE 50 MG/1
100 TABLET ORAL 2 TIMES DAILY
Status: DISCONTINUED | OUTPATIENT
Start: 2021-09-10 | End: 2021-09-15 | Stop reason: HOSPADM

## 2021-09-10 RX ORDER — SODIUM CHLORIDE AND POTASSIUM CHLORIDE 150; 450 MG/100ML; MG/100ML
INJECTION, SOLUTION INTRAVENOUS CONTINUOUS
Status: DISCONTINUED | OUTPATIENT
Start: 2021-09-10 | End: 2021-09-12

## 2021-09-10 RX ORDER — HYDROCODONE BITARTRATE AND ACETAMINOPHEN 10; 325 MG/1; MG/1
1 TABLET ORAL EVERY 6 HOURS PRN
Status: DISCONTINUED | OUTPATIENT
Start: 2021-09-10 | End: 2021-09-15 | Stop reason: HOSPADM

## 2021-09-10 RX ORDER — TADALAFIL 20 MG/1
20 TABLET ORAL
Status: DISCONTINUED | OUTPATIENT
Start: 2021-09-10 | End: 2021-09-10

## 2021-09-10 RX ORDER — PROCHLORPERAZINE EDISYLATE 5 MG/ML
5-10 INJECTION INTRAMUSCULAR; INTRAVENOUS EVERY 4 HOURS PRN
Status: DISCONTINUED | OUTPATIENT
Start: 2021-09-10 | End: 2021-09-10

## 2021-09-10 RX ORDER — ENALAPRILAT 1.25 MG/ML
1.25 INJECTION INTRAVENOUS EVERY 6 HOURS PRN
Status: DISCONTINUED | OUTPATIENT
Start: 2021-09-10 | End: 2021-09-15 | Stop reason: HOSPADM

## 2021-09-10 RX ORDER — POTASSIUM CHLORIDE 20 MEQ/1
40 TABLET, EXTENDED RELEASE ORAL ONCE
Status: COMPLETED | OUTPATIENT
Start: 2021-09-10 | End: 2021-09-10

## 2021-09-10 RX ORDER — AMBRISENTAN 10 MG/1
10 TABLET, FILM COATED ORAL DAILY
Status: DISCONTINUED | OUTPATIENT
Start: 2021-09-11 | End: 2021-09-10

## 2021-09-10 RX ORDER — LEVOTHYROXINE SODIUM 137 UG/1
137 TABLET ORAL
Status: DISCONTINUED | OUTPATIENT
Start: 2021-09-11 | End: 2021-09-15 | Stop reason: HOSPADM

## 2021-09-10 RX ORDER — BISACODYL 10 MG
10 SUPPOSITORY, RECTAL RECTAL
Status: DISCONTINUED | OUTPATIENT
Start: 2021-09-10 | End: 2021-09-11

## 2021-09-10 RX ORDER — FUROSEMIDE 20 MG/1
20 TABLET ORAL DAILY
Status: DISCONTINUED | OUTPATIENT
Start: 2021-09-11 | End: 2021-09-15 | Stop reason: HOSPADM

## 2021-09-10 RX ORDER — ONDANSETRON 2 MG/ML
4 INJECTION INTRAMUSCULAR; INTRAVENOUS EVERY 4 HOURS PRN
Status: DISCONTINUED | OUTPATIENT
Start: 2021-09-10 | End: 2021-09-10

## 2021-09-10 RX ORDER — POLYETHYLENE GLYCOL 3350 17 G/17G
1 POWDER, FOR SOLUTION ORAL
Status: DISCONTINUED | OUTPATIENT
Start: 2021-09-10 | End: 2021-09-11

## 2021-09-10 RX ORDER — PROMETHAZINE HYDROCHLORIDE 25 MG/1
12.5-25 SUPPOSITORY RECTAL EVERY 4 HOURS PRN
Status: DISCONTINUED | OUTPATIENT
Start: 2021-09-10 | End: 2021-09-10

## 2021-09-10 RX ORDER — LABETALOL HYDROCHLORIDE 5 MG/ML
10 INJECTION, SOLUTION INTRAVENOUS EVERY 4 HOURS PRN
Status: DISCONTINUED | OUTPATIENT
Start: 2021-09-10 | End: 2021-09-15 | Stop reason: HOSPADM

## 2021-09-10 RX ORDER — PRAVASTATIN SODIUM 20 MG
20 TABLET ORAL DAILY
Status: DISCONTINUED | OUTPATIENT
Start: 2021-09-11 | End: 2021-09-15 | Stop reason: HOSPADM

## 2021-09-10 RX ORDER — ALPRAZOLAM 0.5 MG/1
1 TABLET ORAL 3 TIMES DAILY PRN
Status: DISCONTINUED | OUTPATIENT
Start: 2021-09-10 | End: 2021-09-15 | Stop reason: HOSPADM

## 2021-09-10 RX ORDER — TACROLIMUS 1 MG/1
4 CAPSULE ORAL DAILY
Status: DISCONTINUED | OUTPATIENT
Start: 2021-09-11 | End: 2021-09-11

## 2021-09-10 RX ORDER — ONDANSETRON 4 MG/1
4 TABLET, ORALLY DISINTEGRATING ORAL EVERY 4 HOURS PRN
Status: DISCONTINUED | OUTPATIENT
Start: 2021-09-10 | End: 2021-09-10

## 2021-09-10 RX ORDER — MYCOPHENOLATE MOFETIL 250 MG/1
250 CAPSULE ORAL 2 TIMES DAILY
Status: DISCONTINUED | OUTPATIENT
Start: 2021-09-10 | End: 2021-09-15 | Stop reason: HOSPADM

## 2021-09-10 RX ORDER — GABAPENTIN 100 MG/1
100 CAPSULE ORAL 2 TIMES DAILY
Status: DISCONTINUED | OUTPATIENT
Start: 2021-09-10 | End: 2021-09-15 | Stop reason: HOSPADM

## 2021-09-10 RX ORDER — POTASSIUM CHLORIDE 20 MEQ/1
40 TABLET, EXTENDED RELEASE ORAL ONCE
Status: COMPLETED | OUTPATIENT
Start: 2021-09-11 | End: 2021-09-11

## 2021-09-10 RX ORDER — FLUDROCORTISONE ACETATE 0.1 MG/1
0.5 TABLET ORAL DAILY
Status: DISCONTINUED | OUTPATIENT
Start: 2021-09-11 | End: 2021-09-11

## 2021-09-10 RX ADMIN — MYCOPHENOLATE MOFETIL 250 MG: 250 CAPSULE ORAL at 22:54

## 2021-09-10 RX ADMIN — POTASSIUM CHLORIDE 40 MEQ: 1500 TABLET, EXTENDED RELEASE ORAL at 22:53

## 2021-09-10 RX ADMIN — LEVETIRACETAM 1500 MG: 500 TABLET ORAL at 22:53

## 2021-09-10 RX ADMIN — POTASSIUM CHLORIDE AND SODIUM CHLORIDE: 450; 150 INJECTION, SOLUTION INTRAVENOUS at 22:52

## 2021-09-10 RX ADMIN — GABAPENTIN 100 MG: 100 CAPSULE ORAL at 22:54

## 2021-09-10 RX ADMIN — LACOSAMIDE 100 MG: 50 TABLET, FILM COATED ORAL at 22:54

## 2021-09-10 RX ADMIN — POTASSIUM CHLORIDE 40 MEQ: 1500 TABLET, EXTENDED RELEASE ORAL at 23:07

## 2021-09-10 RX ADMIN — HYDROCODONE BITARTRATE AND ACETAMINOPHEN 1 TABLET: 5; 325 TABLET ORAL at 19:10

## 2021-09-10 RX ADMIN — HYDROCODONE BITARTRATE AND ACETAMINOPHEN 1 TABLET: 10; 325 TABLET ORAL at 23:07

## 2021-09-10 ASSESSMENT — ENCOUNTER SYMPTOMS
CHILLS: 0
MYALGIAS: 1
FALLS: 1
SHORTNESS OF BREATH: 1
ABDOMINAL PAIN: 0
VOMITING: 0
DIARRHEA: 1
WEAKNESS: 1
BRUISES/BLEEDS EASILY: 1
FEVER: 0
PALPITATIONS: 0
NAUSEA: 0
FOCAL WEAKNESS: 0

## 2021-09-10 ASSESSMENT — PAIN DESCRIPTION - PAIN TYPE: TYPE: ACUTE PAIN

## 2021-09-10 ASSESSMENT — FIBROSIS 4 INDEX: FIB4 SCORE: 1.04

## 2021-09-10 NOTE — ED NOTES
Pt family member to triage desk with complaints about the wait time. PT family member became verbally aggressive to staff while staff was trying to inform them of the pt's status and wait time. Staff apologized for wait times and reminded family member of our policies in triage of no food or drink until the pt has been seen by the doctor. Family member verbalized understanding.

## 2021-09-10 NOTE — ED TRIAGE NOTES
"Presents complaining of headache (she reports a hx of craniotomy by Doctor Arnold performed approximately 2 weeks ago).  She also reports right elbow pain recurring for the past few days consequent to a recent fall (dx of fracture established).  She is on Chronic O2 supplementation to address her chronic COPD.  Chief Complaint   Patient presents with   • Headache   • Elbow Pain     /70   Pulse 80   Temp 36.7 °C (98 °F) (Temporal)   Resp 18   Ht 1.753 m (5' 9\")   Wt 68 kg (149 lb 14.6 oz)   LMP 01/03/2000   SpO2 99%   BMI 22.14 kg/m²   Has this patient been vaccinated for COVID YES    "

## 2021-09-10 NOTE — ED NOTES
"Pt and family member seen leaving [facility], staff asked if pt would sign an AMA form, family member said \"medical advice there isn't any medical advice you are not a doctor\". Family member educated on what the AMA form states and staff asked pt if she would sign and she refused. This tech signed AMA refusal and dismissed pt per EPIC  "

## 2021-09-11 PROBLEM — E83.42 HYPOMAGNESEMIA: Status: ACTIVE | Noted: 2021-09-11

## 2021-09-11 PROBLEM — E83.51 HYPOCALCEMIA: Status: ACTIVE | Noted: 2021-09-11

## 2021-09-11 PROBLEM — R19.7 DIARRHEA: Status: ACTIVE | Noted: 2021-09-11

## 2021-09-11 LAB
ALBUMIN SERPL BCP-MCNC: 2.6 G/DL (ref 3.2–4.9)
ALBUMIN/GLOB SERPL: 1.2 G/DL
ALP SERPL-CCNC: 125 U/L (ref 30–99)
ALT SERPL-CCNC: 35 U/L (ref 2–50)
ANION GAP SERPL CALC-SCNC: 11 MMOL/L (ref 7–16)
ANION GAP SERPL CALC-SCNC: 9 MMOL/L (ref 7–16)
AST SERPL-CCNC: 97 U/L (ref 12–45)
BASOPHILS # BLD AUTO: 0.4 % (ref 0–1.8)
BASOPHILS # BLD AUTO: 0.9 % (ref 0–1.8)
BASOPHILS # BLD: 0.01 K/UL (ref 0–0.12)
BASOPHILS # BLD: 0.02 K/UL (ref 0–0.12)
BILIRUB SERPL-MCNC: 0.6 MG/DL (ref 0.1–1.5)
BUN SERPL-MCNC: 8 MG/DL (ref 8–22)
BUN SERPL-MCNC: 8 MG/DL (ref 8–22)
CALCIUM SERPL-MCNC: 6.7 MG/DL (ref 8.4–10.2)
CALCIUM SERPL-MCNC: 6.8 MG/DL (ref 8.4–10.2)
CHLORIDE SERPL-SCNC: 99 MMOL/L (ref 96–112)
CHLORIDE SERPL-SCNC: 99 MMOL/L (ref 96–112)
CO2 SERPL-SCNC: 32 MMOL/L (ref 20–33)
CO2 SERPL-SCNC: 32 MMOL/L (ref 20–33)
CREAT SERPL-MCNC: 0.51 MG/DL (ref 0.5–1.4)
CREAT SERPL-MCNC: 0.54 MG/DL (ref 0.5–1.4)
EOSINOPHIL # BLD AUTO: 0.11 K/UL (ref 0–0.51)
EOSINOPHIL # BLD AUTO: 0.12 K/UL (ref 0–0.51)
EOSINOPHIL NFR BLD: 5 % (ref 0–6.9)
EOSINOPHIL NFR BLD: 5.1 % (ref 0–6.9)
ERYTHROCYTE [DISTWIDTH] IN BLOOD BY AUTOMATED COUNT: 55.9 FL (ref 35.9–50)
ERYTHROCYTE [DISTWIDTH] IN BLOOD BY AUTOMATED COUNT: 56 FL (ref 35.9–50)
GLOBULIN SER CALC-MCNC: 2.1 G/DL (ref 1.9–3.5)
GLUCOSE SERPL-MCNC: 169 MG/DL (ref 65–99)
GLUCOSE SERPL-MCNC: 239 MG/DL (ref 65–99)
HCT VFR BLD AUTO: 32.9 % (ref 37–47)
HCT VFR BLD AUTO: 38.9 % (ref 37–47)
HGB BLD-MCNC: 10.4 G/DL (ref 12–16)
HGB BLD-MCNC: 12.3 G/DL (ref 12–16)
IMM GRANULOCYTES # BLD AUTO: 0 K/UL (ref 0–0.11)
IMM GRANULOCYTES # BLD AUTO: 0.02 K/UL (ref 0–0.11)
IMM GRANULOCYTES NFR BLD AUTO: 0 % (ref 0–0.9)
IMM GRANULOCYTES NFR BLD AUTO: 0.9 % (ref 0–0.9)
LYMPHOCYTES # BLD AUTO: 0.78 K/UL (ref 1–4.8)
LYMPHOCYTES # BLD AUTO: 1.06 K/UL (ref 1–4.8)
LYMPHOCYTES NFR BLD: 32.6 % (ref 22–41)
LYMPHOCYTES NFR BLD: 48.8 % (ref 22–41)
MAGNESIUM SERPL-MCNC: 1.4 MG/DL (ref 1.5–2.5)
MCH RBC QN AUTO: 30.5 PG (ref 27–33)
MCH RBC QN AUTO: 30.9 PG (ref 27–33)
MCHC RBC AUTO-ENTMCNC: 31.6 G/DL (ref 33.6–35)
MCHC RBC AUTO-ENTMCNC: 31.6 G/DL (ref 33.6–35)
MCV RBC AUTO: 96.5 FL (ref 81.4–97.8)
MCV RBC AUTO: 97.6 FL (ref 81.4–97.8)
MONOCYTES # BLD AUTO: 0.27 K/UL (ref 0–0.85)
MONOCYTES # BLD AUTO: 0.33 K/UL (ref 0–0.85)
MONOCYTES NFR BLD AUTO: 12.4 % (ref 0–13.4)
MONOCYTES NFR BLD AUTO: 13.8 % (ref 0–13.4)
NEUTROPHILS # BLD AUTO: 0.69 K/UL (ref 2–7.15)
NEUTROPHILS # BLD AUTO: 1.15 K/UL (ref 2–7.15)
NEUTROPHILS NFR BLD: 31.9 % (ref 44–72)
NEUTROPHILS NFR BLD: 48.2 % (ref 44–72)
NRBC # BLD AUTO: 0 K/UL
NRBC # BLD AUTO: 0 K/UL
NRBC BLD-RTO: 0 /100 WBC
NRBC BLD-RTO: 0 /100 WBC
PATH REV: NORMAL
PATH REV: NORMAL
PHOSPHATE SERPL-MCNC: 3.3 MG/DL (ref 2.5–4.5)
PLATELET # BLD AUTO: 74 K/UL (ref 164–446)
PLATELET # BLD AUTO: 97 K/UL (ref 164–446)
PMV BLD AUTO: 11.1 FL (ref 9–12.9)
PMV BLD AUTO: 11.3 FL (ref 9–12.9)
POTASSIUM SERPL-SCNC: 2.9 MMOL/L (ref 3.6–5.5)
POTASSIUM SERPL-SCNC: 3.4 MMOL/L (ref 3.6–5.5)
PROT SERPL-MCNC: 4.7 G/DL (ref 6–8.2)
RBC # BLD AUTO: 3.37 M/UL (ref 4.2–5.4)
RBC # BLD AUTO: 4.03 M/UL (ref 4.2–5.4)
SODIUM SERPL-SCNC: 140 MMOL/L (ref 135–145)
SODIUM SERPL-SCNC: 142 MMOL/L (ref 135–145)
TSH SERPL DL<=0.005 MIU/L-ACNC: 1.55 UIU/ML (ref 0.38–5.33)
WBC # BLD AUTO: 2.2 K/UL (ref 4.8–10.8)
WBC # BLD AUTO: 2.4 K/UL (ref 4.8–10.8)

## 2021-09-11 PROCEDURE — 700102 HCHG RX REV CODE 250 W/ 637 OVERRIDE(OP): Performed by: STUDENT IN AN ORGANIZED HEALTH CARE EDUCATION/TRAINING PROGRAM

## 2021-09-11 PROCEDURE — A9270 NON-COVERED ITEM OR SERVICE: HCPCS | Performed by: INTERNAL MEDICINE

## 2021-09-11 PROCEDURE — 84100 ASSAY OF PHOSPHORUS: CPT

## 2021-09-11 PROCEDURE — 700102 HCHG RX REV CODE 250 W/ 637 OVERRIDE(OP): Performed by: INTERNAL MEDICINE

## 2021-09-11 PROCEDURE — A9270 NON-COVERED ITEM OR SERVICE: HCPCS | Performed by: STUDENT IN AN ORGANIZED HEALTH CARE EDUCATION/TRAINING PROGRAM

## 2021-09-11 PROCEDURE — 82306 VITAMIN D 25 HYDROXY: CPT

## 2021-09-11 PROCEDURE — A9270 NON-COVERED ITEM OR SERVICE: HCPCS | Performed by: HOSPITALIST

## 2021-09-11 PROCEDURE — 83970 ASSAY OF PARATHORMONE: CPT

## 2021-09-11 PROCEDURE — 99233 SBSQ HOSP IP/OBS HIGH 50: CPT | Performed by: INTERNAL MEDICINE

## 2021-09-11 PROCEDURE — 85025 COMPLETE CBC W/AUTO DIFF WBC: CPT

## 2021-09-11 PROCEDURE — 700111 HCHG RX REV CODE 636 W/ 250 OVERRIDE (IP): Performed by: INTERNAL MEDICINE

## 2021-09-11 PROCEDURE — 96368 THER/DIAG CONCURRENT INF: CPT

## 2021-09-11 PROCEDURE — 700111 HCHG RX REV CODE 636 W/ 250 OVERRIDE (IP): Performed by: STUDENT IN AN ORGANIZED HEALTH CARE EDUCATION/TRAINING PROGRAM

## 2021-09-11 PROCEDURE — 84443 ASSAY THYROID STIM HORMONE: CPT

## 2021-09-11 PROCEDURE — 96375 TX/PRO/DX INJ NEW DRUG ADDON: CPT

## 2021-09-11 PROCEDURE — 80053 COMPREHEN METABOLIC PANEL: CPT

## 2021-09-11 PROCEDURE — 700101 HCHG RX REV CODE 250: Performed by: STUDENT IN AN ORGANIZED HEALTH CARE EDUCATION/TRAINING PROGRAM

## 2021-09-11 PROCEDURE — 80048 BASIC METABOLIC PNL TOTAL CA: CPT

## 2021-09-11 PROCEDURE — 770020 HCHG ROOM/CARE - TELE (206)

## 2021-09-11 PROCEDURE — 83735 ASSAY OF MAGNESIUM: CPT

## 2021-09-11 PROCEDURE — 82330 ASSAY OF CALCIUM: CPT

## 2021-09-11 PROCEDURE — 700102 HCHG RX REV CODE 250 W/ 637 OVERRIDE(OP): Performed by: HOSPITALIST

## 2021-09-11 PROCEDURE — 96366 THER/PROPH/DIAG IV INF ADDON: CPT

## 2021-09-11 PROCEDURE — 96365 THER/PROPH/DIAG IV INF INIT: CPT

## 2021-09-11 RX ORDER — MAGNESIUM SULFATE HEPTAHYDRATE 40 MG/ML
4 INJECTION, SOLUTION INTRAVENOUS ONCE
Status: COMPLETED | OUTPATIENT
Start: 2021-09-11 | End: 2021-09-11

## 2021-09-11 RX ORDER — POTASSIUM CHLORIDE 20 MEQ/1
40 TABLET, EXTENDED RELEASE ORAL
Status: COMPLETED | OUTPATIENT
Start: 2021-09-11 | End: 2021-09-11

## 2021-09-11 RX ORDER — POTASSIUM CHLORIDE 7.45 MG/ML
10 INJECTION INTRAVENOUS
Status: DISPENSED | OUTPATIENT
Start: 2021-09-11 | End: 2021-09-11

## 2021-09-11 RX ORDER — AMBRISENTAN 10 MG/1
10 TABLET, FILM COATED ORAL DAILY
Status: DISCONTINUED | OUTPATIENT
Start: 2021-09-11 | End: 2021-09-15 | Stop reason: HOSPADM

## 2021-09-11 RX ORDER — CALCIUM GLUCONATE 20 MG/ML
1 INJECTION, SOLUTION INTRAVENOUS ONCE
Status: COMPLETED | OUTPATIENT
Start: 2021-09-11 | End: 2021-09-11

## 2021-09-11 RX ORDER — ACETAMINOPHEN 500 MG
500 TABLET ORAL
Status: ON HOLD | COMMUNITY
End: 2021-01-01

## 2021-09-11 RX ORDER — FLUDROCORTISONE ACETATE 0.1 MG/1
0.2 TABLET ORAL DAILY
Status: DISCONTINUED | OUTPATIENT
Start: 2021-09-12 | End: 2021-09-15 | Stop reason: HOSPADM

## 2021-09-11 RX ORDER — OXYCODONE HYDROCHLORIDE 10 MG/1
10 TABLET ORAL EVERY 6 HOURS PRN
COMMUNITY
Start: 2022-01-01 | End: 2022-01-01 | Stop reason: SDUPTHER

## 2021-09-11 RX ORDER — POTASSIUM CHLORIDE 7.45 MG/ML
10 INJECTION INTRAVENOUS
Status: COMPLETED | OUTPATIENT
Start: 2021-09-11 | End: 2021-09-11

## 2021-09-11 RX ORDER — TACROLIMUS 1 MG/1
2 CAPSULE ORAL 2 TIMES DAILY
Status: DISCONTINUED | OUTPATIENT
Start: 2021-09-12 | End: 2021-09-15 | Stop reason: HOSPADM

## 2021-09-11 RX ORDER — TADALAFIL 20 MG/1
20 TABLET ORAL EVERY EVENING
Status: DISCONTINUED | OUTPATIENT
Start: 2021-09-11 | End: 2021-09-15 | Stop reason: HOSPADM

## 2021-09-11 RX ADMIN — LACOSAMIDE 100 MG: 50 TABLET, FILM COATED ORAL at 18:43

## 2021-09-11 RX ADMIN — POTASSIUM CHLORIDE 40 MEQ: 1500 TABLET, EXTENDED RELEASE ORAL at 14:58

## 2021-09-11 RX ADMIN — MYCOPHENOLATE MOFETIL 250 MG: 250 CAPSULE ORAL at 18:43

## 2021-09-11 RX ADMIN — FLUDROCORTISONE ACETATE 0.5 MG: 0.1 TABLET ORAL at 07:17

## 2021-09-11 RX ADMIN — AMBRISENTAN 10 MG: 10 TABLET, FILM COATED ORAL at 13:30

## 2021-09-11 RX ADMIN — HYDROCODONE BITARTRATE AND ACETAMINOPHEN 1 TABLET: 10; 325 TABLET ORAL at 14:46

## 2021-09-11 RX ADMIN — GABAPENTIN 100 MG: 100 CAPSULE ORAL at 07:17

## 2021-09-11 RX ADMIN — LACOSAMIDE 100 MG: 50 TABLET, FILM COATED ORAL at 07:18

## 2021-09-11 RX ADMIN — FUROSEMIDE 20 MG: 40 TABLET ORAL at 07:14

## 2021-09-11 RX ADMIN — GABAPENTIN 100 MG: 100 CAPSULE ORAL at 18:44

## 2021-09-11 RX ADMIN — ALPRAZOLAM 1 MG: 0.5 TABLET ORAL at 18:38

## 2021-09-11 RX ADMIN — ALPRAZOLAM 1 MG: 0.5 TABLET ORAL at 00:10

## 2021-09-11 RX ADMIN — MYCOPHENOLATE MOFETIL 250 MG: 250 CAPSULE ORAL at 07:17

## 2021-09-11 RX ADMIN — MAGNESIUM SULFATE IN WATER 4 G: 40 INJECTION, SOLUTION INTRAVENOUS at 12:28

## 2021-09-11 RX ADMIN — POTASSIUM CHLORIDE 10 MEQ: 7.46 INJECTION, SOLUTION INTRAVENOUS at 14:37

## 2021-09-11 RX ADMIN — POTASSIUM CHLORIDE AND SODIUM CHLORIDE: 450; 150 INJECTION, SOLUTION INTRAVENOUS at 19:45

## 2021-09-11 RX ADMIN — HYDROCODONE BITARTRATE AND ACETAMINOPHEN 1 TABLET: 10; 325 TABLET ORAL at 23:08

## 2021-09-11 RX ADMIN — POTASSIUM CHLORIDE 40 MEQ: 1500 TABLET, EXTENDED RELEASE ORAL at 12:30

## 2021-09-11 RX ADMIN — LEVOTHYROXINE SODIUM 137 MCG: 25 TABLET ORAL at 07:58

## 2021-09-11 RX ADMIN — CALCIUM GLUCONATE 1 G: 20 INJECTION, SOLUTION INTRAVENOUS at 14:40

## 2021-09-11 RX ADMIN — SERTRALINE HYDROCHLORIDE 100 MG: 50 TABLET ORAL at 00:10

## 2021-09-11 RX ADMIN — ALPRAZOLAM 1 MG: 0.5 TABLET ORAL at 07:09

## 2021-09-11 RX ADMIN — POTASSIUM CHLORIDE 40 MEQ: 1500 TABLET, EXTENDED RELEASE ORAL at 00:02

## 2021-09-11 RX ADMIN — LEVETIRACETAM 1500 MG: 500 TABLET ORAL at 07:12

## 2021-09-11 RX ADMIN — POTASSIUM CHLORIDE AND SODIUM CHLORIDE: 450; 150 INJECTION, SOLUTION INTRAVENOUS at 09:45

## 2021-09-11 RX ADMIN — PRAVASTATIN SODIUM 20 MG: 20 TABLET ORAL at 07:17

## 2021-09-11 RX ADMIN — TADALAFIL 20 MG: 20 TABLET ORAL at 18:00

## 2021-09-11 RX ADMIN — POTASSIUM CHLORIDE 10 MEQ: 7.46 INJECTION, SOLUTION INTRAVENOUS at 12:29

## 2021-09-11 RX ADMIN — LEVETIRACETAM 1500 MG: 500 TABLET ORAL at 18:44

## 2021-09-11 RX ADMIN — HYDROCODONE BITARTRATE AND ACETAMINOPHEN 1 TABLET: 10; 325 TABLET ORAL at 07:10

## 2021-09-11 RX ADMIN — TACROLIMUS 4 MG: 1 CAPSULE ORAL at 07:10

## 2021-09-11 ASSESSMENT — PATIENT HEALTH QUESTIONNAIRE - PHQ9
2. FEELING DOWN, DEPRESSED, IRRITABLE, OR HOPELESS: SEVERAL DAYS
5. POOR APPETITE OR OVEREATING: MORE THAN HALF THE DAYS
8. MOVING OR SPEAKING SO SLOWLY THAT OTHER PEOPLE COULD HAVE NOTICED. OR THE OPPOSITE, BEING SO FIGETY OR RESTLESS THAT YOU HAVE BEEN MOVING AROUND A LOT MORE THAN USUAL: MORE THAN HALF THE DAYS
4. FEELING TIRED OR HAVING LITTLE ENERGY: NEARLY EVERY DAY
3. TROUBLE FALLING OR STAYING ASLEEP OR SLEEPING TOO MUCH: NOT AT ALL
SUM OF ALL RESPONSES TO PHQ QUESTIONS 1-9: 8
1. LITTLE INTEREST OR PLEASURE IN DOING THINGS: NOT AT ALL
9. THOUGHTS THAT YOU WOULD BE BETTER OFF DEAD, OR OF HURTING YOURSELF: NOT AT ALL
SUM OF ALL RESPONSES TO PHQ9 QUESTIONS 1 AND 2: 1
6. FEELING BAD ABOUT YOURSELF - OR THAT YOU ARE A FAILURE OR HAVE LET YOURSELF OR YOUR FAMILY DOWN: NOT AL ALL
7. TROUBLE CONCENTRATING ON THINGS, SUCH AS READING THE NEWSPAPER OR WATCHING TELEVISION: NOT AT ALL

## 2021-09-11 ASSESSMENT — ENCOUNTER SYMPTOMS
FLANK PAIN: 0
HEARTBURN: 0
SPUTUM PRODUCTION: 0
PALPITATIONS: 0
FEVER: 0
TREMORS: 0
NERVOUS/ANXIOUS: 0
DIZZINESS: 1
ABDOMINAL PAIN: 1
NECK PAIN: 0
CHILLS: 0
HALLUCINATIONS: 0
HEADACHES: 1
BLURRED VISION: 0
DIARRHEA: 1
FOCAL WEAKNESS: 0
WEIGHT LOSS: 0
SPEECH CHANGE: 0
BACK PAIN: 0
ORTHOPNEA: 0
NAUSEA: 0
PHOTOPHOBIA: 0
DOUBLE VISION: 0
HEMOPTYSIS: 0
BRUISES/BLEEDS EASILY: 0
COUGH: 0
VOMITING: 0
POLYDIPSIA: 0

## 2021-09-11 ASSESSMENT — LIFESTYLE VARIABLES
HOW MANY TIMES IN THE PAST YEAR HAVE YOU HAD 5 OR MORE DRINKS IN A DAY: 0
TOTAL SCORE: 0
HAVE PEOPLE ANNOYED YOU BY CRITICIZING YOUR DRINKING: NO
TOTAL SCORE: 0
SUBSTANCE_ABUSE: 0
AVERAGE NUMBER OF DAYS PER WEEK YOU HAVE A DRINK CONTAINING ALCOHOL: 0
HAVE YOU EVER FELT YOU SHOULD CUT DOWN ON YOUR DRINKING: NO
EVER FELT BAD OR GUILTY ABOUT YOUR DRINKING: NO
ON A TYPICAL DAY WHEN YOU DRINK ALCOHOL HOW MANY DRINKS DO YOU HAVE: 0
CONSUMPTION TOTAL: NEGATIVE
TOTAL SCORE: 0
EVER HAD A DRINK FIRST THING IN THE MORNING TO STEADY YOUR NERVES TO GET RID OF A HANGOVER: NO
ALCOHOL_USE: NO

## 2021-09-11 ASSESSMENT — COGNITIVE AND FUNCTIONAL STATUS - GENERAL
DRESSING REGULAR LOWER BODY CLOTHING: A LITTLE
HELP NEEDED FOR BATHING: A LITTLE
MOVING FROM LYING ON BACK TO SITTING ON SIDE OF FLAT BED: A LITTLE
TOILETING: A LITTLE
DAILY ACTIVITIY SCORE: 20
STANDING UP FROM CHAIR USING ARMS: A LITTLE
CLIMB 3 TO 5 STEPS WITH RAILING: A LOT
MOBILITY SCORE: 17
MOVING TO AND FROM BED TO CHAIR: A LITTLE
SUGGESTED CMS G CODE MODIFIER DAILY ACTIVITY: CJ
SUGGESTED CMS G CODE MODIFIER MOBILITY: CK
DRESSING REGULAR UPPER BODY CLOTHING: A LITTLE
WALKING IN HOSPITAL ROOM: A LOT

## 2021-09-11 ASSESSMENT — PAIN DESCRIPTION - PAIN TYPE
TYPE: ACUTE PAIN
TYPE: ACUTE PAIN

## 2021-09-11 ASSESSMENT — FIBROSIS 4 INDEX: FIB4 SCORE: 10.31

## 2021-09-11 NOTE — ASSESSMENT & PLAN NOTE
History of pancytopenia follows with a hematologist.  She has no active bleeding.  She does have a noticeable decrease in her platelets from last month but this does not appear to be new for the patient.  She has received transfusions in the past.  Continue to monitor.    9/13 discussed with hematology  treatment for Campylobacter gastroenteritis  Further work-up work-up for infection. Follow-up with UA, blood culture.  Follow with mycophenolate and tacrolimus levels.  Reduce dose  if elevated  SPEP, urine electrophoresis, immunoglobulins ordered

## 2021-09-11 NOTE — PROGRESS NOTES
[facility] Medicine Daily Progress Note    Date of Service  9/11/2021    Chief Complaint/Hospital Course  61 y.o. female who presented 9/10/2021 with a history of liver transplant, subdural hematoma, elbow fracture, sarcoidosis, chronic respiratory failure on 3 L who presents with headache and elbow pain.,  complaints of generalized weakness, watery diarrhea 3-4 times a day,, multiple electrolytes abnormalities, dehydration.          Interval Problem Update  Patient continues complain of headache.  According to her, she has been having recurrent falls, most recent 2 days ago.  CT head without contrast negative for acute changes, postop decrease in size of left subdural hematoma.  Ordered C. difficile and stool culture.  Ordered additional potassium and magnesium supplementation.    I have personally seen and examined the patient at bedside. I discussed the plan of care with patient.    Consultants/Specialty  None    Code Status  Full Code    Disposition  Patient is not medically cleared.   Anticipate discharge to to home with close outpatient follow-up.  I have placed the appropriate orders for post-discharge needs.    Review of Systems  Review of Systems   Constitutional: Negative for chills, fever and weight loss.   HENT: Negative for ear pain, hearing loss and tinnitus.    Eyes: Negative for blurred vision, double vision and photophobia.   Respiratory: Negative for cough, hemoptysis and sputum production.    Cardiovascular: Negative for chest pain, palpitations and orthopnea.   Gastrointestinal: Positive for abdominal pain and diarrhea. Negative for heartburn, nausea and vomiting.   Genitourinary: Negative for dysuria, flank pain, frequency and hematuria.   Musculoskeletal: Positive for joint pain. Negative for back pain and neck pain.   Skin: Negative for itching and rash.   Neurological: Positive for dizziness and headaches. Negative for tremors, speech change and focal weakness.   Endo/Heme/Allergies: Negative  for environmental allergies and polydipsia. Does not bruise/bleed easily.   Psychiatric/Behavioral: Negative for hallucinations and substance abuse. The patient is not nervous/anxious.         Physical Exam  Pulse:  [63-89] 72  Resp:  [12-18] 15  BP: ()/(57-80) 117/62  SpO2:  [94 %-100 %] 94 %    Physical Exam  Vitals and nursing note reviewed.   Constitutional:       General: She is not in acute distress.     Appearance: Normal appearance. She is ill-appearing.   HENT:      Head: Normocephalic and atraumatic.      Nose: Nose normal.      Mouth/Throat:      Mouth: Mucous membranes are dry.   Eyes:      Extraocular Movements: Extraocular movements intact.      Pupils: Pupils are equal, round, and reactive to light.   Cardiovascular:      Rate and Rhythm: Normal rate and regular rhythm.   Pulmonary:      Effort: Pulmonary effort is normal.      Breath sounds: Normal breath sounds.   Abdominal:      General: Abdomen is flat. There is no distension.      Tenderness: There is generalized abdominal tenderness. There is no guarding or rebound.   Musculoskeletal:         General: No swelling.      Right elbow: Swelling and deformity present. Decreased range of motion. Tenderness present.      Cervical back: Normal range of motion and neck supple.   Skin:     General: Skin is warm and dry.   Neurological:      General: No focal deficit present.      Mental Status: She is alert and oriented to person, place, and time.   Psychiatric:         Mood and Affect: Mood normal.         Behavior: Behavior normal.         Fluids  No intake or output data in the 24 hours ending 09/11/21 1511    Laboratory  Recent Labs     09/10/21  2001 09/11/21  0933   WBC 2.2* 2.4*   RBC 3.37* 4.03*   HEMOGLOBIN 10.4* 12.3   HEMATOCRIT 32.9* 38.9   MCV 97.6 96.5   MCH 30.9 30.5   MCHC 31.6* 31.6*   RDW 56.0* 55.9*   PLATELETCT 74* 97*   MPV 11.3 11.1     Recent Labs     09/10/21  2001 09/11/21  0933   SODIUM 147* 142   POTASSIUM 2.1* 2.9*    CHLORIDE 99 99   CO2 31 32   GLUCOSE 83 169*   BUN 8 8   CREATININE 0.52 0.51   CALCIUM 6.8* 6.8*                   Imaging  CT-HEAD W/O   Final Result      1.  Interval decrease in postoperative left subdural collection with decreased density. Mass effect on the underlying sulci without significant midline shift.      2.  Diffuse atrophy and periventricular white matter changes, consistent with chronic small vessel disease         DX-FOREARM RIGHT   Final Result      1.  Mildly distracted fracture of the RIGHT olecranon with overlying soft tissue swelling.   2.  No radial or ulnar shaft fracture.           Assessment/Plan  * Hypokalemia  Assessment & Plan  K 2.1. Patient reports diarrhea for a couple weeks after her increased Keppra dose. Suspect the hypokalemia is due to GI losses in addition to K loss from lasix.  Replete potassium  Repeat potassium in a.m.   Telemetry  9/11 improving      Diarrhea  Assessment & Plan  History of C. difficile infection  C. difficile rule out ordered  IV rehydration    Hypocalcemia  Assessment & Plan  IV calcium gluconate  We will check ionized calcium, PTH, vitamin D level    Hypomagnesemia  Assessment & Plan  IV magnesium ordered    Seizures (HCC)- (present on admission)  Assessment & Plan  Continue home meds Vimpat and Keppra. Patient feels that the Keppra may be giving her diarrhea will evaluate.    Hypernatremia  Assessment & Plan  Possibly due to dehydration  Resolved    Pancytopenia (HCC)- (present on admission)  Assessment & Plan  History of pancytopenia follows with a hematologist.  She has no active bleeding.  She does have a noticeable decrease in her platelets from last month but this does not appear to be new for the patient.  She has received transfusions in the past.  Continue to monitor.    Adrenal insufficiency (HCC)- (present on admission)  Assessment & Plan  Continue fludrocortisone.  Dose of fludrocortisone decreased to 0.2 due to concern for electrolyte  abnormalities  Check morning cortisol    Chronic respiratory failure with hypoxia (HCC)- (present on admission)  Assessment & Plan  On baseline 3 L  Has a history of sarcoidosis, pulmonary hypertension, hepatopulmonary syndrome  Continue home O2    Acquired hypothyroidism- (present on admission)  Assessment & Plan  Continue home medication levothyroxine 137 MCG    Status post liver transplant Dr. Canada ([facility])- (present on admission)  Assessment & Plan  History of liver transplant in 2011 secondary to sarcoidosis.  Continue antirejection meds       VTE prophylaxis: SCDs/TEDs    I have performed a physical exam and reviewed and updated ROS and Plan today (9/11/2021). In review of yesterday's note (9/10/2021), there are no changes except as documented above.

## 2021-09-11 NOTE — ED NOTES
maryellen from Lab called with critical result of WBC 2.4 at 1008. Critical lab result read back to maryellen.   Primary RN made aware.

## 2021-09-11 NOTE — ASSESSMENT & PLAN NOTE
Continue fludrocortisone.  Dose of fludrocortisone decreased to 0.2 due to concern for electrolyte abnormalities  A.m. cortisol level is within normal limits

## 2021-09-11 NOTE — ASSESSMENT & PLAN NOTE
Continue home meds Vimpat and Keppra. Patient feels that the Keppra may be giving her diarrhea will evaluate.

## 2021-09-11 NOTE — ASSESSMENT & PLAN NOTE
History of C. difficile infection  C. difficile rule negative  9/13 Campylobacter antigen positive.  Will start on azithromycin IV (not p.o. due to nausea)  Symptomatic treatment  Rehydration IV/p.o.

## 2021-09-11 NOTE — ED NOTES
Pt seen by hospitalist at BS for rounding, new maintenance fluids hung and infusing w/o difficulty

## 2021-09-11 NOTE — ED NOTES
Neftali from Lab called with critical result of Calcium of 6.8 at 1033. Critical lab result read back to Neftali.   Dr. Xiao notified of critical lab result at 1035.  Critical lab result read back by Dr. Xiao.

## 2021-09-11 NOTE — ASSESSMENT & PLAN NOTE
IV calcium gluconate given, continue with oral calcium supplementation.  PTH elevated, vitamin D within normal limits.

## 2021-09-11 NOTE — ED NOTES
Med rec partially complete per pt at bedside and pharmacy records  Pt states her  manages her medications and he should be awake around 0900, and to call him around that time  Allergies reviewed  No ABX in last 14 days    Will update med rec if additional information provided by , when able to contact

## 2021-09-11 NOTE — ASSESSMENT & PLAN NOTE
K 2.1. Patient reports diarrhea for a couple weeks after her increased Keppra dose. Suspect the hypokalemia is due to GI losses in addition to K loss from lasix.  Replete potassium  resolved
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
## 2021-09-11 NOTE — ED NOTES
Lab called with reports of potassium 2.1; calcium 6.8; WBC 2.2 and platelets of 74,  Results read back Dr. Koehler notified

## 2021-09-11 NOTE — ASSESSMENT & PLAN NOTE
On baseline 3 L  Has a history of sarcoidosis, pulmonary hypertension, hepatopulmonary syndrome  Continue home O2

## 2021-09-11 NOTE — PROGRESS NOTES
Attending Hospitalist today will be Dr. Xiao starting at 0700.   Fely Hernandez RN  Hospitalist Service

## 2021-09-11 NOTE — H&P
Hospital Medicine History & Physical Note    Date of Service  9/10/2021    Primary Care Physician  Elisa Phillip M.D.    Consultants  none    Specialist Names: none    Code Status  Full Code    Chief Complaint  Chief Complaint   Patient presents with   • Headache   • Elbow Pain       History of Presenting Illness  Roxana Cuba is a 61 y.o. female who presented 9/10/2021 with a history of liver transplant, subdural hematoma, elbow fracture, sarcoidosis, chronic respiratory failure on 3 L who presents with headache and elbow pain.  Patient reports that she has had multiple falls and reports that part of the issue is her oxygen tubing.  She reports that she recently fell and hit the back of her head/top of her neck and her right elbow.  She recently had a neurosurgery performed and CT in the ED shows improvement.  Patient's right elbow was placed in a sling by [facility] urgent care.  She reports she has multiple medical problems but most of them are chronic.  She is on 3 L chronically due to her pulmonary hypertension, sarcoidosis.  She denies fevers, chills, nausea, vomiting, abdominal pain.  She does report diarrhea which she attributes to her Keppra dose.    I discussed the plan of care with patient and family.    Review of Systems  Review of Systems   Constitutional: Positive for malaise/fatigue. Negative for chills and fever.   Respiratory: Positive for shortness of breath (chronic).    Cardiovascular: Negative for chest pain, palpitations and leg swelling.   Gastrointestinal: Positive for diarrhea. Negative for abdominal pain, nausea and vomiting.   Genitourinary: Negative for dysuria.   Musculoskeletal: Positive for falls, joint pain and myalgias.   Neurological: Positive for weakness. Negative for focal weakness.   Endo/Heme/Allergies: Bruises/bleeds easily.   All other systems reviewed and are negative.      Past Medical History   has a past medical history of Anemia, Anesthesia, Breath shortness, Bronchitis (  ), Cardiomegaly, Chronic fatigue and malaise, Cirrhosis (HCC) (December 2011), CKD (chronic kidney disease) stage 3, GFR 30-59 ml/min (HCC), Diabetes (HCC), Fracture of left foot, GERD (gastroesophageal reflux disease), H/O Clostridium difficile infection (02-17-17), Hemorrhagic disorder (HCC), Hiatus hernia syndrome, High cholesterol, Hypothyroid, Jaundice (2009), Liver transplanted (HCC), Low back pain (02-17-17), Mild aortic regurgitation and aortic valve sclerosis ( ), On home oxygen therapy, Platelet disorder (HCC), Pneumonia, Psychiatric disorder, Pulmonary hypertension (HCC), S/P cholecystectomy, Small bowel obstruction (HCC), Splenomegaly, and VRE (vancomycin-resistant Enterococci).    Surgical History   has a past surgical history that includes pr anesth,nose,sinus surgery; makayla by laparoscopy (9/19/2009); exam under anesthesia (11/11/2009); hysterectomy, total abdominal; gastroscopy-endo (3/12/2010); bronchoscopy-endo (5/29/2012); bronchoscopy-endo (6/19/2012); sigmoidoscopy flex (9/26/2012); recovery (2/27/2013); bronchoscopy-endo (11/15/2013); recovery (1/21/2014); recovery (3/24/2014); hemorrhoidectomy (11/11/2009); gastroscopy (9/28/2012); carpal tunnel release; bone marrow aspiration (12/31/2012); bone marrow biopsy, ndl/trocar (12/31/2012); recovery (3/31/2014); other abdominal surgery (December 2011); bone marrow aspiration (3/16/2015); bone marrow biopsy, ndl/trocar (3/16/2015); lung biopsy open (11/2016); tonsillectomy; other abdominal surgery (); breast biopsy (Left, 2/21/2017); colonoscopy (N/A, 3/27/2017); gastroscopy (N/A, 3/27/2017); gastric bypass laparoscopic; hernia repair; makayla by laparoscopy; other; other (12/11/2017); other; turbinate reduction (Bilateral, 10/4/2018); nasal reconstruction (Bilateral, 10/4/2018); ventral hernia repair robotic xi (N/A, 11/12/2018); and craniotomy (Left, 8/4/2021).     Family History  family history includes Alcohol/Drug in her maternal  grandfather, maternal grandmother, and mother; Cancer in her paternal aunt; Diabetes in her father; Heart Disease in her father; Hyperlipidemia in her father and mother; Hypertension in her father; Non-contributory in her mother; Other in her father; Stroke in her father.   Family history reviewed with patient. There is no family history that is pertinent to the chief complaint.     Social History   reports that she has never smoked. She has never used smokeless tobacco. She reports previous alcohol use. She reports that she does not use drugs.    Allergies  Allergies   Allergen Reactions   • Rhubarb Anaphylaxis   • Nsaids      Can not take due to hx of liver transplant    • Organic Nitrates      Nitroglycerin products should be avoided with the use of PDE-5 inhibitors as the combination can result in severe hypotension.  RD clarified with pt: Nitrates in food are okay and pt does not want nitrates to be listed as food allergy. Pt only avoids medications with nitrates.    • Other Drug      Any medication that may interact with cyclosporine, tacrolimus, sirolimus, or prograf (due to hx of liver transplant)    • Vancomycin Hcl      Causes red man syndrome when infused to fast         Medications  Prior to Admission Medications   Prescriptions Last Dose Informant Patient Reported? Taking?   ALPRAZolam (XANAX) 1 MG Tab   Yes No   Sig: Take 1 mg by mouth 3 times a day as needed for Anxiety.   [facility] ESOMEPRAZOLE MAGNESIUM 40 MG PO CPDR   Yes No   Sig: Take 40 mg by mouth every day.   Calcium Carbonate (CALCIUM 500 PO)   Yes No   Sig: Take 1 Tablet by mouth every day. NOT TAKING AT THIS TIME   Calcium Carbonate 500 MG Chew Tab   No No   Sig: Chew 500 mg every day.   HYDROcodone/acetaminophen (NORCO)  MG Tab   Yes No   Sig: Take 1 Tablet by mouth. AS NEEDED FOR PAIN   Home Care Oxygen   Yes No   Sig: Inhale 3 L/min continuous. Oxygen delivered from prefered, via nasal cannula  Oxygen dose range: 3 L/min  Respiratory  route via: Nasal Cannula   Oxygen supplier: Preferred       Hyoscyamine Sulfate (HYOSCYAMINE PO)   Yes No   Sig: Take 0.2 mg by mouth as needed. NOT IN HOUSE   Naloxegol Oxalate (MOVANTIK) 25 MG Tab   Yes No   Sig: Take 25 mg by mouth every day.   Prucalopride Succinate 2 MG Tab   Yes No   Sig: Take 2 mg by mouth every day. NOT IN HOUSE   acetaminophen (TYLENOL) 500 MG Tab   Yes No   Sig: Take 500 mg by mouth every 6 hours as needed for Mild Pain.   ambrisentan (LETAIRIS) 10 MG tablet   No No   Sig: Take 1 Tablet by mouth every day.   fludrocortisone (FLORINEF) 0.1 MG Tab   No No   Sig: Take 5 Tablets by mouth every day for 30 days.   furosemide (LASIX) 20 MG Tab   No No   Sig: Take 1 Tablet by mouth every day for 30 days.   gabapentin (NEURONTIN) 100 MG Cap   No No   Sig: Take 1 Capsule by mouth 2 times a day for 30 days.   lacosamide (VIMPAT) 100 MG Tab tablet   No No   Sig: Take 1 Tablet by mouth 2 times a day for 30 days.   levetiracetam (KEPPRA) 750 MG tablet   No No   Sig: Take 2 Tablets by Enteral Tube route 2 times a day for 30 days.   levothyroxine (SYNTHROID) 137 MCG Tab   No No   Sig: Take 1 Tablet by mouth every morning on an empty stomach.   meclizine (ANTIVERT) 12.5 MG Tab   Yes No   Sig: Take 25 mg by mouth as needed for Nausea/Vomiting. NOT IN HOUSE   metoclopramide (REGLAN) 10 MG Tab   Yes No   Sig: Take 10 mg by mouth as needed. NOT IN HOUSE  Indications: Nausea and Vomiting   mycophenolate (CELLCEPT) 250 MG Cap   No No   Sig: Take 1 Capsule by mouth 2 times a day for 30 days.   non-formulary med   Yes No   Sig: Take 1 Tablet by mouth every day at 6 PM. MEDICATION FROM SWEDEN,KALCIPOS-D FORTE 500MG/800IE  As alternative to calcium tums 500mg po   pravastatin (PRAVACHOL) 20 MG Tab   No No   Sig: Take 1 Tablet by mouth every day.   sertraline (ZOLOFT) 100 MG Tab   No No   Sig: Take 1 Tablet by mouth every day.   tacrolimus (PROGRAF) 1 MG Cap   No No   Sig: Take 4 Capsules by mouth every day for 30  days.   tadalafil (CIALIS) 20 MG tablet   No No   Sig: Take 1 Tablet by mouth at bedtime for 30 days.   therapeutic multivitamin-minerals (THERAGRAN-M) Tab   Yes No   Sig: Take 1 tablet by mouth every day.      Facility-Administered Medications: None       Physical Exam  Temp:  [36.7 °C (98 °F)] 36.7 °C (98 °F)  Pulse:  [66-80] 66  Resp:  [18] 18  BP: (108-122)/(70-80) 122/80  SpO2:  [99 %-100 %] 100 %  Blood Pressure: 122/80   Temperature: 36.7 °C (98 °F)   Pulse: 66   Respiration: 18   Pulse Oximetry: 100 %       Physical Exam  Vitals and nursing note reviewed.   Constitutional:       General: She is not in acute distress.     Appearance: Normal appearance. She is not toxic-appearing.   HENT:      Head: Normocephalic and atraumatic.   Eyes:      General: No scleral icterus.        Right eye: No discharge.         Left eye: No discharge.   Cardiovascular:      Rate and Rhythm: Normal rate and regular rhythm.      Heart sounds: Normal heart sounds. No murmur heard.     Pulmonary:      Effort: Pulmonary effort is normal. No respiratory distress.      Breath sounds: No wheezing or rales.      Comments: Coarse breath sounds  Abdominal:      General: Abdomen is flat. Bowel sounds are normal. There is no distension.      Tenderness: There is no abdominal tenderness. There is no guarding.   Musculoskeletal:         General: Tenderness present.      Right lower leg: No edema.      Left lower leg: No edema.      Comments: Right arm in sling   Skin:     General: Skin is warm and dry.      Coloration: Skin is not jaundiced.   Neurological:      Mental Status: She is alert and oriented to person, place, and time.      Cranial Nerves: No cranial nerve deficit.   Psychiatric:         Mood and Affect: Mood normal.         Behavior: Behavior normal.         Judgment: Judgment normal.         Laboratory:  Recent Labs     09/10/21  2001   WBC 2.2*   RBC 3.37*   HEMOGLOBIN 10.4*   HEMATOCRIT 32.9*   MCV 97.6   MCH 30.9   MCHC 31.6*    RDW 56.0*   PLATELETCT 74*   MPV 11.3     Recent Labs     09/10/21  2001   SODIUM 147*   POTASSIUM 2.1*   CHLORIDE 99   CO2 31   GLUCOSE 83   BUN 8   CREATININE 0.52   CALCIUM 6.8*     Recent Labs     09/10/21  2001   GLUCOSE 83         No results for input(s): NTPROBNP in the last 72 hours.      No results for input(s): TROPONINT in the last 72 hours.    Imaging:  CT-HEAD W/O   Final Result      1.  Interval decrease in postoperative left subdural collection with decreased density. Mass effect on the underlying sulci without significant midline shift.      2.  Diffuse atrophy and periventricular white matter changes, consistent with chronic small vessel disease         DX-FOREARM RIGHT   Final Result      1.  Mildly distracted fracture of the RIGHT olecranon with overlying soft tissue swelling.   2.  No radial or ulnar shaft fracture.          EKG:  My impression is: sinus rhythm, no peaked t waves    Assessment/Plan:  I anticipate this patient will require at least two midnights for appropriate medical management, necessitating inpatient admission.    * Hypokalemia  Assessment & Plan  K 2.1. Patient reports diarrhea for a couple weeks after her increased Keppra dose. Suspect the hypokalemia is due to GI losses in addition to K loss from lasix.  Replete potassium  Repeat potassium in a.m.   telemetry      Seizures (HCC)- (present on admission)  Assessment & Plan  Continue home meds Vimpat and Keppra. Patient feels that the Keppra may be giving her diarrhea will evaluate.    Hypernatremia  Assessment & Plan  Possibly due to dehydration  IVF   Repeat BMP    Pancytopenia (HCC)- (present on admission)  Assessment & Plan  History of pancytopenia follows with a hematologist.  She has no active bleeding.  She does have a noticeable decrease in her platelets from last month but this does not appear to be new for the patient.  She has received transfusions in the past.  Continue to monitor.    Chronic respiratory failure  with hypoxia (HCC)- (present on admission)  Assessment & Plan  On baseline 3 L  Has a history of sarcoidosis, pulmonary hypertension, hepatopulmonary syndrome    Acquired hypothyroidism- (present on admission)  Assessment & Plan  Continue home medication levothyroxine 137 MCG    Status post liver transplant Dr. [place] ([facility])- (present on admission)  Assessment & Plan  History of liver transplant in 2011 secondary to sarcoidosis.  Continue antirejection meds      VTE prophylaxis: SCDs/TEDs

## 2021-09-11 NOTE — ED NOTES
Blood draw x 2 attempted through each IV, Pt. Very difficult IV stick; phleb called for blood work.

## 2021-09-12 PROBLEM — R53.81 DEBILITY: Status: ACTIVE | Noted: 2021-09-12

## 2021-09-12 LAB
25(OH)D3 SERPL-MCNC: 64 NG/ML (ref 30–100)
ALBUMIN SERPL BCP-MCNC: 2.1 G/DL (ref 3.2–4.9)
ALBUMIN/GLOB SERPL: 1.4 G/DL
ALP SERPL-CCNC: 97 U/L (ref 30–99)
ALT SERPL-CCNC: 30 U/L (ref 2–50)
ANION GAP SERPL CALC-SCNC: 8 MMOL/L (ref 7–16)
AST SERPL-CCNC: 85 U/L (ref 12–45)
BASOPHILS # BLD AUTO: 1.1 % (ref 0–1.8)
BASOPHILS # BLD: 0.02 K/UL (ref 0–0.12)
BILIRUB SERPL-MCNC: 0.3 MG/DL (ref 0.1–1.5)
BUN SERPL-MCNC: 8 MG/DL (ref 8–22)
C DIFF DNA SPEC QL NAA+PROBE: NEGATIVE
C DIFF TOX GENS STL QL NAA+PROBE: NEGATIVE
CA-I SERPL-SCNC: 0.95 MMOL/L (ref 1.1–1.3)
CALCIUM SERPL-MCNC: 6.6 MG/DL (ref 8.4–10.2)
CHLORIDE SERPL-SCNC: 102 MMOL/L (ref 96–112)
CK SERPL-CCNC: 53 U/L (ref 0–154)
CO2 SERPL-SCNC: 30 MMOL/L (ref 20–33)
CORTIS SERPL-MCNC: 5.9 UG/DL (ref 0–23)
CREAT SERPL-MCNC: 0.46 MG/DL (ref 0.5–1.4)
EOSINOPHIL # BLD AUTO: 0.16 K/UL (ref 0–0.51)
EOSINOPHIL NFR BLD: 8.8 % (ref 0–6.9)
ERYTHROCYTE [DISTWIDTH] IN BLOOD BY AUTOMATED COUNT: 55.7 FL (ref 35.9–50)
GLOBULIN SER CALC-MCNC: 1.5 G/DL (ref 1.9–3.5)
GLUCOSE SERPL-MCNC: 102 MG/DL (ref 65–99)
HCT VFR BLD AUTO: 31.1 % (ref 37–47)
HGB BLD-MCNC: 9.9 G/DL (ref 12–16)
IMM GRANULOCYTES # BLD AUTO: 0 K/UL (ref 0–0.11)
IMM GRANULOCYTES NFR BLD AUTO: 0 % (ref 0–0.9)
LEVETIRACETAM SERPL-MCNC: 69 UG/ML (ref 12–46)
LYMPHOCYTES # BLD AUTO: 0.81 K/UL (ref 1–4.8)
LYMPHOCYTES NFR BLD: 44.5 % (ref 22–41)
MAGNESIUM SERPL-MCNC: 2 MG/DL (ref 1.5–2.5)
MCH RBC QN AUTO: 31.3 PG (ref 27–33)
MCHC RBC AUTO-ENTMCNC: 31.8 G/DL (ref 33.6–35)
MCV RBC AUTO: 98.4 FL (ref 81.4–97.8)
MONOCYTES # BLD AUTO: 0.19 K/UL (ref 0–0.85)
MONOCYTES NFR BLD AUTO: 10.4 % (ref 0–13.4)
NEUTROPHILS # BLD AUTO: 0.64 K/UL (ref 2–7.15)
NEUTROPHILS NFR BLD: 35.2 % (ref 44–72)
NRBC # BLD AUTO: 0 K/UL
NRBC BLD-RTO: 0 /100 WBC
PHOSPHATE SERPL-MCNC: 3.1 MG/DL (ref 2.5–4.5)
PLATELET # BLD AUTO: 81 K/UL (ref 164–446)
PMV BLD AUTO: 10.7 FL (ref 9–12.9)
POTASSIUM SERPL-SCNC: 3.5 MMOL/L (ref 3.6–5.5)
PROT SERPL-MCNC: 3.6 G/DL (ref 6–8.2)
PTH-INTACT SERPL-MCNC: 155 PG/ML (ref 14–72)
PTH-INTACT SERPL-MCNC: 177 PG/ML (ref 14–72)
RBC # BLD AUTO: 3.16 M/UL (ref 4.2–5.4)
SODIUM SERPL-SCNC: 140 MMOL/L (ref 135–145)
WBC # BLD AUTO: 1.8 K/UL (ref 4.8–10.8)

## 2021-09-12 PROCEDURE — 700102 HCHG RX REV CODE 250 W/ 637 OVERRIDE(OP): Performed by: HOSPITALIST

## 2021-09-12 PROCEDURE — 700111 HCHG RX REV CODE 636 W/ 250 OVERRIDE (IP): Performed by: INTERNAL MEDICINE

## 2021-09-12 PROCEDURE — 770020 HCHG ROOM/CARE - TELE (206)

## 2021-09-12 PROCEDURE — A9270 NON-COVERED ITEM OR SERVICE: HCPCS | Performed by: INTERNAL MEDICINE

## 2021-09-12 PROCEDURE — 80053 COMPREHEN METABOLIC PANEL: CPT

## 2021-09-12 PROCEDURE — 82533 TOTAL CORTISOL: CPT

## 2021-09-12 PROCEDURE — 700111 HCHG RX REV CODE 636 W/ 250 OVERRIDE (IP): Performed by: STUDENT IN AN ORGANIZED HEALTH CARE EDUCATION/TRAINING PROGRAM

## 2021-09-12 PROCEDURE — 97162 PT EVAL MOD COMPLEX 30 MIN: CPT

## 2021-09-12 PROCEDURE — 83735 ASSAY OF MAGNESIUM: CPT

## 2021-09-12 PROCEDURE — 700101 HCHG RX REV CODE 250: Performed by: STUDENT IN AN ORGANIZED HEALTH CARE EDUCATION/TRAINING PROGRAM

## 2021-09-12 PROCEDURE — 87045 FECES CULTURE AEROBIC BACT: CPT

## 2021-09-12 PROCEDURE — 83970 ASSAY OF PARATHORMONE: CPT

## 2021-09-12 PROCEDURE — A9270 NON-COVERED ITEM OR SERVICE: HCPCS | Performed by: STUDENT IN AN ORGANIZED HEALTH CARE EDUCATION/TRAINING PROGRAM

## 2021-09-12 PROCEDURE — 84100 ASSAY OF PHOSPHORUS: CPT

## 2021-09-12 PROCEDURE — 700102 HCHG RX REV CODE 250 W/ 637 OVERRIDE(OP): Performed by: STUDENT IN AN ORGANIZED HEALTH CARE EDUCATION/TRAINING PROGRAM

## 2021-09-12 PROCEDURE — A9270 NON-COVERED ITEM OR SERVICE: HCPCS | Performed by: HOSPITALIST

## 2021-09-12 PROCEDURE — 87493 C DIFF AMPLIFIED PROBE: CPT

## 2021-09-12 PROCEDURE — 700102 HCHG RX REV CODE 250 W/ 637 OVERRIDE(OP): Performed by: INTERNAL MEDICINE

## 2021-09-12 PROCEDURE — 82550 ASSAY OF CK (CPK): CPT

## 2021-09-12 PROCEDURE — 87899 AGENT NOS ASSAY W/OPTIC: CPT

## 2021-09-12 PROCEDURE — 85025 COMPLETE CBC W/AUTO DIFF WBC: CPT

## 2021-09-12 PROCEDURE — 99232 SBSQ HOSP IP/OBS MODERATE 35: CPT | Performed by: INTERNAL MEDICINE

## 2021-09-12 RX ORDER — LOPERAMIDE HYDROCHLORIDE 2 MG/1
2 CAPSULE ORAL 4 TIMES DAILY PRN
Status: DISCONTINUED | OUTPATIENT
Start: 2021-09-12 | End: 2021-09-15 | Stop reason: HOSPADM

## 2021-09-12 RX ORDER — ECHINACEA PURPUREA EXTRACT 125 MG
2 TABLET ORAL
Status: DISCONTINUED | OUTPATIENT
Start: 2021-09-12 | End: 2021-09-15 | Stop reason: HOSPADM

## 2021-09-12 RX ORDER — BUTALBITAL, ACETAMINOPHEN AND CAFFEINE 50; 325; 40 MG/1; MG/1; MG/1
1 TABLET ORAL ONCE
Status: COMPLETED | OUTPATIENT
Start: 2021-09-12 | End: 2021-09-12

## 2021-09-12 RX ORDER — CALCIUM CARBONATE 500 MG/1
1000 TABLET, CHEWABLE ORAL 2 TIMES DAILY
Status: DISCONTINUED | OUTPATIENT
Start: 2021-09-12 | End: 2021-09-15 | Stop reason: HOSPADM

## 2021-09-12 RX ORDER — POTASSIUM CHLORIDE 20 MEQ/1
40 TABLET, EXTENDED RELEASE ORAL DAILY
Status: DISCONTINUED | OUTPATIENT
Start: 2021-09-12 | End: 2021-09-15 | Stop reason: HOSPADM

## 2021-09-12 RX ADMIN — TACROLIMUS 2 MG: 1 CAPSULE ORAL at 05:41

## 2021-09-12 RX ADMIN — BUTALBITAL, ACETAMINOPHEN AND CAFFEINE 1 TABLET: 50; 325; 40 TABLET ORAL at 20:41

## 2021-09-12 RX ADMIN — AMBRISENTAN 10 MG: 10 TABLET, FILM COATED ORAL at 06:00

## 2021-09-12 RX ADMIN — CALCIUM CARBONATE (ANTACID) CHEW TAB 500 MG 1000 MG: 500 CHEW TAB at 17:27

## 2021-09-12 RX ADMIN — ALPRAZOLAM 1 MG: 0.5 TABLET ORAL at 22:09

## 2021-09-12 RX ADMIN — GABAPENTIN 100 MG: 100 CAPSULE ORAL at 05:40

## 2021-09-12 RX ADMIN — CALCIUM CARBONATE (ANTACID) CHEW TAB 500 MG 1000 MG: 500 CHEW TAB at 09:34

## 2021-09-12 RX ADMIN — GABAPENTIN 100 MG: 100 CAPSULE ORAL at 17:27

## 2021-09-12 RX ADMIN — LEVOTHYROXINE SODIUM 137 MCG: 25 TABLET ORAL at 05:39

## 2021-09-12 RX ADMIN — POTASSIUM CHLORIDE 40 MEQ: 1500 TABLET, EXTENDED RELEASE ORAL at 09:34

## 2021-09-12 RX ADMIN — MYCOPHENOLATE MOFETIL 250 MG: 250 CAPSULE ORAL at 05:40

## 2021-09-12 RX ADMIN — MYCOPHENOLATE MOFETIL 250 MG: 250 CAPSULE ORAL at 17:28

## 2021-09-12 RX ADMIN — FLUDROCORTISONE ACETATE 0.2 MG: 0.1 TABLET ORAL at 05:40

## 2021-09-12 RX ADMIN — HYDROCODONE BITARTRATE AND ACETAMINOPHEN 1 TABLET: 10; 325 TABLET ORAL at 14:18

## 2021-09-12 RX ADMIN — ALPRAZOLAM 1 MG: 0.5 TABLET ORAL at 14:17

## 2021-09-12 RX ADMIN — LACOSAMIDE 100 MG: 50 TABLET, FILM COATED ORAL at 17:27

## 2021-09-12 RX ADMIN — POTASSIUM CHLORIDE AND SODIUM CHLORIDE: 450; 150 INJECTION, SOLUTION INTRAVENOUS at 05:38

## 2021-09-12 RX ADMIN — LACOSAMIDE 100 MG: 50 TABLET, FILM COATED ORAL at 05:40

## 2021-09-12 RX ADMIN — PRAVASTATIN SODIUM 20 MG: 20 TABLET ORAL at 05:41

## 2021-09-12 RX ADMIN — LEVETIRACETAM 1500 MG: 500 TABLET ORAL at 17:28

## 2021-09-12 RX ADMIN — LEVETIRACETAM 1500 MG: 500 TABLET ORAL at 05:41

## 2021-09-12 RX ADMIN — TADALAFIL 20 MG: 20 TABLET ORAL at 18:00

## 2021-09-12 RX ADMIN — TACROLIMUS 2 MG: 1 CAPSULE ORAL at 17:27

## 2021-09-12 RX ADMIN — SERTRALINE HYDROCHLORIDE 100 MG: 50 TABLET ORAL at 05:40

## 2021-09-12 RX ADMIN — HYDROCODONE BITARTRATE AND ACETAMINOPHEN 1 TABLET: 10; 325 TABLET ORAL at 05:41

## 2021-09-12 ASSESSMENT — ENCOUNTER SYMPTOMS
HALLUCINATIONS: 0
NERVOUS/ANXIOUS: 0
WEIGHT LOSS: 0
FEVER: 0
BRUISES/BLEEDS EASILY: 0
SPUTUM PRODUCTION: 0
HEARTBURN: 0
NAUSEA: 0
NECK PAIN: 0
PHOTOPHOBIA: 0
HEADACHES: 1
POLYDIPSIA: 0
FOCAL WEAKNESS: 0
ORTHOPNEA: 0
SPEECH CHANGE: 0
DIZZINESS: 1
BLURRED VISION: 0
TREMORS: 0
COUGH: 0
VOMITING: 0
DIARRHEA: 1
FLANK PAIN: 0
DOUBLE VISION: 0
HEMOPTYSIS: 0
ABDOMINAL PAIN: 1
BACK PAIN: 0
PALPITATIONS: 0
CHILLS: 0

## 2021-09-12 ASSESSMENT — PAIN DESCRIPTION - PAIN TYPE
TYPE: ACUTE PAIN

## 2021-09-12 ASSESSMENT — GAIT ASSESSMENTS
ASSISTIVE DEVICE: OTHER (COMMENTS)
GAIT LEVEL OF ASSIST: MINIMAL ASSIST
DEVIATION: DECREASED BASE OF SUPPORT;DECREASED HEEL STRIKE;DECREASED TOE OFF;BRADYKINETIC
DISTANCE (FEET): 20

## 2021-09-12 ASSESSMENT — COGNITIVE AND FUNCTIONAL STATUS - GENERAL
MOBILITY SCORE: 18
WALKING IN HOSPITAL ROOM: A LITTLE
MOVING TO AND FROM BED TO CHAIR: A LITTLE
CLIMB 3 TO 5 STEPS WITH RAILING: A LOT
STANDING UP FROM CHAIR USING ARMS: A LITTLE
SUGGESTED CMS G CODE MODIFIER MOBILITY: CK
MOVING FROM LYING ON BACK TO SITTING ON SIDE OF FLAT BED: A LITTLE

## 2021-09-12 ASSESSMENT — LIFESTYLE VARIABLES: SUBSTANCE_ABUSE: 0

## 2021-09-12 NOTE — DISCHARGE PLANNING
*ATTN Discharge Planning team: This patient is currently on service with [facility]. Please submit a referral order and face-to-face prior to discharging the patient home. If you have any questions, contact our Transitional Care Specialist team at x [phone].

## 2021-09-12 NOTE — PROGRESS NOTES
Telemetry Shift Summary     Rhythm SR  HR Range 61-71  Ectopy  fPVCs, rPACs  Measurements .14/.08/.38              Normal Values  Rhythm SR  HR Range    Measurements 0.12-0.20 / 0.06-0.10  / 0.30-0.52

## 2021-09-12 NOTE — PROGRESS NOTES
Tele monitor applied, vitals taken. Pt assessed. A&Ox4. Admit profile and med rec completed. Discussed POC with pt, including electrolyte replacement, need stool sample, reverse isolation. Welcome folder provided and discussed. Communication board filled out. Questions and concerns addressed, verbalized understanding. Fall precautions in place. Pt demonstrates ability to use call light appropriately. Pt left in lowest position. Bed locked and low, bed alarm on.

## 2021-09-12 NOTE — PROGRESS NOTES
Lab called critical calcium 6.7 mg/dL. Updated Dr. Warner of patient's critical result and correct calcium being 7.8 mg/dL with albumin of 2.6 g/dL.

## 2021-09-12 NOTE — PROGRESS NOTES
Hospital Medicine Daily Progress Note    Date of Service  9/12/2021    Chief Complaint/Hospital Course  61 y.o. female who presented 9/10/2021 with a history of liver transplant, subdural hematoma, elbow fracture, sarcoidosis, chronic respiratory failure on 3 L who presents with headache and elbow pain.,  complaints of generalized weakness, watery diarrhea 3-4 times a day,, multiple electrolytes abnormalities, dehydration.          Interval Problem Update  9/11 patient continues complain of headache.  According to her, she has been having recurrent falls, most recent 2 days ago.  CT head without contrast negative for acute changes, postop decrease in size of left subdural hematoma.  Ordered C. difficile and stool culture.  Ordered additional potassium and magnesium supplementation.    9/12  Patient continues having loose bowel movements.  C. difficile negative.  Ordered Imodium  Magnesium normalized, hypokalemia improved.  Calcium and ionized calcium is still low.  Ordered protective isolation due to absolute neutropenia.  Ordered rehab consult per PT OT recommendations.    I have personally seen and examined the patient at bedside. I discussed the plan of care with patient.    Consultants/Specialty  None    Code Status  Full Code    Disposition  Patient is not medically cleared.   Anticipate discharge to to home with close outpatient follow-up.  I have placed the appropriate orders for post-discharge needs.    Review of Systems  Review of Systems   Constitutional: Negative for chills, fever and weight loss.   HENT: Negative for ear pain, hearing loss and tinnitus.    Eyes: Negative for blurred vision, double vision and photophobia.   Respiratory: Negative for cough, hemoptysis and sputum production.    Cardiovascular: Negative for chest pain, palpitations and orthopnea.   Gastrointestinal: Positive for abdominal pain and diarrhea. Negative for heartburn, nausea and vomiting.   Genitourinary: Negative for dysuria,  flank pain, frequency and hematuria.   Musculoskeletal: Positive for joint pain. Negative for back pain and neck pain.   Skin: Negative for itching and rash.   Neurological: Positive for dizziness and headaches. Negative for tremors, speech change and focal weakness.   Endo/Heme/Allergies: Negative for environmental allergies and polydipsia. Does not bruise/bleed easily.   Psychiatric/Behavioral: Negative for hallucinations and substance abuse. The patient is not nervous/anxious.         Physical Exam  Temp:  [36.4 °C (97.5 °F)-36.8 °C (98.3 °F)] 36.8 °C (98.3 °F)  Pulse:  [69-94] 94  Resp:  [13-20] 18  BP: (130-168)/(68-87) 149/69  SpO2:  [94 %-99 %] 94 %    Physical Exam  Vitals and nursing note reviewed.   Constitutional:       General: She is not in acute distress.     Appearance: Normal appearance. She is ill-appearing.   HENT:      Head: Normocephalic and atraumatic.      Nose: Nose normal.      Mouth/Throat:      Mouth: Mucous membranes are dry.   Eyes:      Extraocular Movements: Extraocular movements intact.      Pupils: Pupils are equal, round, and reactive to light.   Cardiovascular:      Rate and Rhythm: Normal rate and regular rhythm.   Pulmonary:      Effort: Pulmonary effort is normal.      Breath sounds: Normal breath sounds.   Abdominal:      General: Abdomen is flat. There is no distension.      Tenderness: There is generalized abdominal tenderness. There is no guarding or rebound.   Musculoskeletal:         General: No swelling.      Right elbow: Swelling and deformity present. Decreased range of motion. Tenderness present.      Cervical back: Normal range of motion and neck supple.   Skin:     General: Skin is warm and dry.   Neurological:      General: No focal deficit present.      Mental Status: She is alert and oriented to person, place, and time.   Psychiatric:         Mood and Affect: Mood normal.         Behavior: Behavior normal.         Fluids    Intake/Output Summary (Last 24 hours) at  9/12/2021 1451  Last data filed at 9/12/2021 1000  Gross per 24 hour   Intake 1590 ml   Output --   Net 1590 ml       Laboratory  Recent Labs     09/10/21  2001 09/11/21  0933 09/12/21  0513   WBC 2.2* 2.4* 1.8*   RBC 3.37* 4.03* 3.16*   HEMOGLOBIN 10.4* 12.3 9.9*   HEMATOCRIT 32.9* 38.9 31.1*   MCV 97.6 96.5 98.4*   MCH 30.9 30.5 31.3   MCHC 31.6* 31.6* 31.8*   RDW 56.0* 55.9* 55.7*   PLATELETCT 74* 97* 81*   MPV 11.3 11.1 10.7     Recent Labs     09/11/21 0933 09/11/21 2112 09/12/21  0513   SODIUM 142 140 140   POTASSIUM 2.9* 3.4* 3.5*   CHLORIDE 99 99 102   CO2 32 32 30   GLUCOSE 169* 239* 102*   BUN 8 8 8   CREATININE 0.51 0.54 0.46*   CALCIUM 6.8* 6.7* 6.6*                   Imaging  CT-HEAD W/O   Final Result      1.  Interval decrease in postoperative left subdural collection with decreased density. Mass effect on the underlying sulci without significant midline shift.      2.  Diffuse atrophy and periventricular white matter changes, consistent with chronic small vessel disease         DX-FOREARM RIGHT   Final Result      1.  Mildly distracted fracture of the RIGHT olecranon with overlying soft tissue swelling.   2.  No radial or ulnar shaft fracture.           Assessment/Plan  * Hypokalemia  Assessment & Plan  K 2.1. Patient reports diarrhea for a couple weeks after her increased Keppra dose. Suspect the hypokalemia is due to GI losses in addition to K loss from lasix.  Replete potassium  Repeat potassium in a.m.     9/12 improving      Debility  Assessment & Plan  PT recommended placement for rehab  Rehab consult requested.    Diarrhea  Assessment & Plan  History of C. difficile infection  C. difficile rule negative  Imodium.      Hypocalcemia  Assessment & Plan  IV calcium gluconate  We will check ionized calcium, PTH, vitamin D level    Hypomagnesemia  Assessment & Plan  IV magnesium ordered    Seizures (HCC)- (present on admission)  Assessment & Plan  Continue home meds Vimpat and Keppra. Patient  feels that the Keppra may be giving her diarrhea will evaluate.    Hypernatremia  Assessment & Plan  Possibly due to dehydration  Resolved    Pancytopenia (HCC)- (present on admission)  Assessment & Plan  History of pancytopenia follows with a hematologist.  She has no active bleeding.  She does have a noticeable decrease in her platelets from last month but this does not appear to be new for the patient.  She has received transfusions in the past.  Continue to monitor.    Follow-up with hematology  Protective isolation for neutropenia    Adrenal insufficiency (HCC)- (present on admission)  Assessment & Plan  Continue fludrocortisone.  Dose of fludrocortisone decreased to 0.2 due to concern for electrolyte abnormalities  A.m. cortisol level is within normal limits      Chronic respiratory failure with hypoxia (HCC)- (present on admission)  Assessment & Plan  On baseline 3 L  Has a history of sarcoidosis, pulmonary hypertension, hepatopulmonary syndrome  Continue home O2    Acquired hypothyroidism- (present on admission)  Assessment & Plan  Continue home medication levothyroxine 137 MCG    Status post liver transplant Dr. [place] ([facility])- (present on admission)  Assessment & Plan  History of liver transplant in 2011 secondary to sarcoidosis.  Continue antirejection meds       VTE prophylaxis: SCDs/TEDs    I have performed a physical exam and reviewed and updated ROS and Plan today (9/12/2021). In review of yesterday's note (9/11/2021), there are no changes except as documented above.

## 2021-09-12 NOTE — THERAPY
"Physical Therapy   Initial Evaluation     Patient Name: Roxana Cuba  Age:  61 y.o., Sex:  female  Medical Record #: [id number]  Today's Date: 9/12/2021     Precautions  Precautions: (P) Fall Risk;Other (See Comments);Sling Right Upper Extremity (protective isolation)  Comments: (P) R elbow fx. assume NWB RUE    Assessment  Patient is 61 y.o. female with a diagnosis of hypokalemia and Hx of liver transplant. Pt admit following a fall 2 days ago stating that a seizure caused her fall.  During one of her recurring falls over the past 3 weeks pt sustained a R elbow fx and another fall a L SDH s/p neurosurgery.  Pt has had a long complicated PMHx, recently stuck in [facility] for 2+ years with prolong hospitalization and deconditioning.Chronic RF on 3L O2 due to sarcoidosis and pulmonary hypertension. Pt is inconsistent with her reports of falls.   Pt demo's deconditioning and decreased balance affecting safe transfers and ait. While pt is \"tired of hospitals\" she is at high risk for falls. Her home support system is unknown at this time. Recommend post acute Pt services until her upright tolerance and conditioning improves.Will follow during acute care.             Plan    Recommend Physical Therapy 4 times per week until therapy goals are met for the following treatments:  Bed Mobility, Gait Training, Neuro Re-Education / Balance, Self Care/Home Evaluation, Therapeutic Activities and Therapeutic Exercises    DC Equipment Recommendations: (P) Unable to determine at this time  Discharge Recommendations: (P) Recommend post-acute placement for additional physical therapy services prior to discharge home        09/12/21 1329   Total Time Spent   Total Time Spent (Mins) 45   Charge Group   PT Evaluation PT Evaluation Mod   Initial Contact Note    Initial Contact Note Order Received and Verified, Physical Therapy Evaluation in Progress with Full Report to Follow.   Precautions   Precautions Fall Risk;Other (See Comments);Sling " Right Upper Extremity  (protective isolation)   Comments R elbow fx. assume NWB RUE   Vitals   O2 (LPM) 3   O2 Delivery Device Nasal Cannula   Pain 0 - 10 Group   Location Elbow   Therapist Pain Assessment 4;Nurse Notified;Post Activity Pain Same as Prior to Activity   Prior Living Situation   Prior Services Home-Independent;Other (Comments)  (HHPT was set up, but had not strated)   Housing / Facility 1 Story Apartment / Condo   Steps Into Home 0   Steps In Home 0   Equipment Owned Front-Wheel Walker;Oxygen   Lives with - Patient's Self Care Capacity Spouse   Prior Level of Functional Mobility   Bed Mobility Independent   Transfer Status Independent   Ambulation Independent  (frequent falls)   Distance Ambulation (Feet) 150  (household)   Assistive Devices Used Front-Wheel Walker   Comments pt reports not walking much last 2 years while hospitalized in [facility], pt had just been home 1 day after a 14 hour flight. details of reported events and times do not match.    History of Falls   History of Falls Yes   Date of Last Fall 09/10/21  (several falls have been reported, details not consistant)   Cognition    Cognition / Consciousness X   Comments the details of her falls and timeline is inconsistant with MD reports. poor insight to deficits affecting indep living.     Passive ROM Lower Body   Passive ROM Lower Body WDL   Active ROM Lower Body    Active ROM Lower Body  WDL   Strength Lower Body   Lower Body Strength  X   Gross Strength Generalized Weakness, Equal Bilaterally   Comments wobbly when first up.    Sensation Lower Body   Lower Extremity Sensation   WDL   Neurological Concerns   Neurological Concerns Yes   Comments Pt had reported that a seizure caused her to fall. Hx of SDH    Coordination Lower Body    Coordination Lower Body  WDL   Balance Assessment   Sitting Balance (Static) Fair -   Sitting Balance (Dynamic) Fair -   Standing Balance (Static) Fair -   Standing Balance (Dynamic) Fair -   Weight Shift  Sitting Fair   Weight Shift Standing Fair   Comments with IV POLE. Pt c/o of dizziness when sitting.    Gait Analysis   Gait Level Of Assist Minimal Assist   Assistive Device Other (Comments)  (IV pole)   Distance (Feet) 20   # of Times Distance was Traveled 1   Deviation Decreased Base Of Support;Decreased Heel Strike;Decreased Toe Off;Bradykinetic   # of Stairs Climbed 0   Weight Bearing Status no restrictions BLE, assume NWB R elbow    Bed Mobility    Supine to Sit Minimal Assist  (L side)   Sit to Supine Supervised   Scooting Supervised   Rolling Supervised   Functional Mobility   Sit to Stand Supervised   Bed, Chair, Wheelchair Transfer Minimal Assist   How much difficulty does the patient currently have...   Turning over in bed (including adjusting bedclothes, sheets and blankets)? 4   Sitting down on and standing up from a chair with arms (e.g., wheelchair, bedside commode, etc.) 3   Moving from lying on back to sitting on the side of the bed? 3   How much help from another person does the patient currently need...   Moving to and from a bed to a chair (including a wheelchair)? 3   Need to walk in a hospital room? 3   Climbing 3-5 steps with a railing? 2   6 clicks Mobility Score 18   Activity Tolerance   Sitting Edge of Bed 15 mins   Standing 7 mins   Comments pt c/o of dizzness when sitting EOB passed in 5 mins   Edema / Skin Assessment   Edema / Skin  Not Assessed   Patient / Family Goals    Patient / Family Goal #1 pt wants to go home and out of hospital    Short Term Goals    Short Term Goal # 1 in 6 V pt will perform bed mobility with flat bed and no rail with indep   Short Term Goal # 2 in 6 V pt will transfer to varioius surfaces with mod indep with SPC and mod indep inorder to return home.     Short Term Goal # 3 in 6 V pt will amb using a SPC x 200 feet with mod indep including enviromental barriers and obstacles inorder to return home.      Education Group   Education Provided Role of Physical  Therapist   Role of Physical Therapist Patient Response Patient;Acceptance;Explanation;Verbal Demonstration   Problem List    Problems Impaired Bed Mobility;Impaired Transfers;Impaired Ambulation;Decreased Activity Tolerance;Safety Awareness Deficits / Cognition;Impaired Balance;Functional Strength Deficit   Anticipated Discharge Equipment and Recommendations   DC Equipment Recommendations Unable to determine at this time   Discharge Recommendations Recommend post-acute placement for additional physical therapy services prior to discharge home   Interdisciplinary Plan of Care Collaboration   IDT Collaboration with  Nursing   Patient Position at End of Therapy In Bed;Phone within Reach;Tray Table within Reach;Call Light within Reach   Collaboration Comments re: sania    Session Information   Date / Session Number  9/12/21-1 ( 1/4, 9/18)

## 2021-09-12 NOTE — PROGRESS NOTES
4 Eyes Skin Assessment Completed by PENNY Mejia and PENNY Pérez.    Head Scab and Incision  Ears WDL  Nose WDL  Mouth WDL  Neck WDL  Breast/Chest WDL  Shoulder Blades WDL  Spine WDL  (R) Arm/Elbow/Hand Edema, broken, bruising  (L) Arm/Elbow/Hand WDL  Abdomen Old incision  Groin WDL  Scrotum/Coccyx/Buttocks WDL  (R) Leg Bruising  (L) Leg WDL  (R) Heel/Foot/Toe WDL  (L) Heel/Foot/Toe WDL          Devices In Places Tele Box and Nasal Cannula      Interventions In Place Pillows and Pressure Redistribution Mattress    Possible Skin Injury No    Pictures Uploaded Into Epic N/A  Wound Consult Placed N/A  RN Wound Prevention Protocol Ordered No

## 2021-09-12 NOTE — PROGRESS NOTES
Lab called with critical results WBC 1.8 and calcium 6.6. Corrected calcium is 8.1. Dr. Warner updated.

## 2021-09-12 NOTE — ASSESSMENT & PLAN NOTE
Multifactorial;  frequent falls  PT recommended placement for rehab  Rehab consult requested.  Patient does not want to go to rehab, wants to go home on discharge

## 2021-09-13 ENCOUNTER — HOME CARE VISIT (OUTPATIENT)
Dept: HOME HEALTH SERVICES | Facility: HOME HEALTHCARE | Age: 61
End: 2021-09-13
Payer: MEDICARE

## 2021-09-13 ENCOUNTER — HOSPITAL ENCOUNTER (INPATIENT)
Facility: REHABILITATION | Age: 61
End: 2021-09-13
Attending: PHYSICAL MEDICINE & REHABILITATION | Admitting: PHYSICAL MEDICINE & REHABILITATION
Payer: MEDICARE

## 2021-09-13 PROBLEM — A04.5 CAMPYLOBACTER GASTROENTERITIS: Status: ACTIVE | Noted: 2021-09-11

## 2021-09-13 LAB
ALBUMIN SERPL BCP-MCNC: 2.2 G/DL (ref 3.2–4.9)
ALBUMIN/GLOB SERPL: 1.5 G/DL
ALP SERPL-CCNC: 102 U/L (ref 30–99)
ALT SERPL-CCNC: 34 U/L (ref 2–50)
ANION GAP SERPL CALC-SCNC: 5 MMOL/L (ref 7–16)
AST SERPL-CCNC: 69 U/L (ref 12–45)
BASOPHILS # BLD AUTO: 1.2 % (ref 0–1.8)
BASOPHILS # BLD: 0.02 K/UL (ref 0–0.12)
BILIRUB SERPL-MCNC: 0.3 MG/DL (ref 0.1–1.5)
BUN SERPL-MCNC: 8 MG/DL (ref 8–22)
CALCIUM SERPL-MCNC: 7.4 MG/DL (ref 8.4–10.2)
CHLORIDE SERPL-SCNC: 105 MMOL/L (ref 96–112)
CO2 SERPL-SCNC: 31 MMOL/L (ref 20–33)
CREAT SERPL-MCNC: 0.48 MG/DL (ref 0.5–1.4)
E COLI SXT1+2 STL IA: NORMAL
EOSINOPHIL # BLD AUTO: 0.1 K/UL (ref 0–0.51)
EOSINOPHIL NFR BLD: 5.8 % (ref 0–6.9)
ERYTHROCYTE [DISTWIDTH] IN BLOOD BY AUTOMATED COUNT: 56.3 FL (ref 35.9–50)
GLOBULIN SER CALC-MCNC: 1.5 G/DL (ref 1.9–3.5)
GLUCOSE SERPL-MCNC: 92 MG/DL (ref 65–99)
HCT VFR BLD AUTO: 31.1 % (ref 37–47)
HGB BLD-MCNC: 9.5 G/DL (ref 12–16)
IMM GRANULOCYTES # BLD AUTO: 0.01 K/UL (ref 0–0.11)
IMM GRANULOCYTES NFR BLD AUTO: 0.6 % (ref 0–0.9)
LYMPHOCYTES # BLD AUTO: 0.76 K/UL (ref 1–4.8)
LYMPHOCYTES NFR BLD: 44.2 % (ref 22–41)
MAGNESIUM SERPL-MCNC: 1.9 MG/DL (ref 1.5–2.5)
MCH RBC QN AUTO: 30.4 PG (ref 27–33)
MCHC RBC AUTO-ENTMCNC: 30.5 G/DL (ref 33.6–35)
MCV RBC AUTO: 99.7 FL (ref 81.4–97.8)
MONOCYTES # BLD AUTO: 0.21 K/UL (ref 0–0.85)
MONOCYTES NFR BLD AUTO: 12.2 % (ref 0–13.4)
NEUTROPHILS # BLD AUTO: 0.62 K/UL (ref 2–7.15)
NEUTROPHILS NFR BLD: 36 % (ref 44–72)
NRBC # BLD AUTO: 0 K/UL
NRBC BLD-RTO: 0 /100 WBC
PHOSPHATE SERPL-MCNC: 2.8 MG/DL (ref 2.5–4.5)
PLATELET # BLD AUTO: 90 K/UL (ref 164–446)
PMV BLD AUTO: 10.4 FL (ref 9–12.9)
POTASSIUM SERPL-SCNC: 4.2 MMOL/L (ref 3.6–5.5)
PROT SERPL-MCNC: 3.7 G/DL (ref 6–8.2)
RBC # BLD AUTO: 3.12 M/UL (ref 4.2–5.4)
SIGNIFICANT IND 70042: NORMAL
SITE SITE: NORMAL
SODIUM SERPL-SCNC: 141 MMOL/L (ref 135–145)
SOURCE SOURCE: NORMAL
WBC # BLD AUTO: 1.7 K/UL (ref 4.8–10.8)

## 2021-09-13 PROCEDURE — 97166 OT EVAL MOD COMPLEX 45 MIN: CPT

## 2021-09-13 PROCEDURE — 97535 SELF CARE MNGMENT TRAINING: CPT

## 2021-09-13 PROCEDURE — 82784 ASSAY IGA/IGD/IGG/IGM EACH: CPT

## 2021-09-13 PROCEDURE — 87040 BLOOD CULTURE FOR BACTERIA: CPT | Mod: 91

## 2021-09-13 PROCEDURE — 82785 ASSAY OF IGE: CPT

## 2021-09-13 PROCEDURE — 84155 ASSAY OF PROTEIN SERUM: CPT | Mod: 91

## 2021-09-13 PROCEDURE — 83735 ASSAY OF MAGNESIUM: CPT

## 2021-09-13 PROCEDURE — 700102 HCHG RX REV CODE 250 W/ 637 OVERRIDE(OP): Performed by: INTERNAL MEDICINE

## 2021-09-13 PROCEDURE — 99232 SBSQ HOSP IP/OBS MODERATE 35: CPT | Performed by: INTERNAL MEDICINE

## 2021-09-13 PROCEDURE — A9270 NON-COVERED ITEM OR SERVICE: HCPCS | Performed by: INTERNAL MEDICINE

## 2021-09-13 PROCEDURE — A9270 NON-COVERED ITEM OR SERVICE: HCPCS | Performed by: HOSPITALIST

## 2021-09-13 PROCEDURE — 700102 HCHG RX REV CODE 250 W/ 637 OVERRIDE(OP): Performed by: HOSPITALIST

## 2021-09-13 PROCEDURE — 80180 DRUG SCRN QUAN MYCOPHENOLATE: CPT

## 2021-09-13 PROCEDURE — 36415 COLL VENOUS BLD VENIPUNCTURE: CPT

## 2021-09-13 PROCEDURE — 700111 HCHG RX REV CODE 636 W/ 250 OVERRIDE (IP): Performed by: INTERNAL MEDICINE

## 2021-09-13 PROCEDURE — 84165 PROTEIN E-PHORESIS SERUM: CPT | Mod: 91

## 2021-09-13 PROCEDURE — 80053 COMPREHEN METABOLIC PANEL: CPT

## 2021-09-13 PROCEDURE — 84100 ASSAY OF PHOSPHORUS: CPT

## 2021-09-13 PROCEDURE — 700111 HCHG RX REV CODE 636 W/ 250 OVERRIDE (IP): Performed by: STUDENT IN AN ORGANIZED HEALTH CARE EDUCATION/TRAINING PROGRAM

## 2021-09-13 PROCEDURE — 86334 IMMUNOFIX E-PHORESIS SERUM: CPT

## 2021-09-13 PROCEDURE — A9270 NON-COVERED ITEM OR SERVICE: HCPCS | Performed by: STUDENT IN AN ORGANIZED HEALTH CARE EDUCATION/TRAINING PROGRAM

## 2021-09-13 PROCEDURE — 85025 COMPLETE CBC W/AUTO DIFF WBC: CPT

## 2021-09-13 PROCEDURE — 700102 HCHG RX REV CODE 250 W/ 637 OVERRIDE(OP): Performed by: STUDENT IN AN ORGANIZED HEALTH CARE EDUCATION/TRAINING PROGRAM

## 2021-09-13 PROCEDURE — 770020 HCHG ROOM/CARE - TELE (206)

## 2021-09-13 PROCEDURE — 80197 ASSAY OF TACROLIMUS: CPT

## 2021-09-13 RX ORDER — SUCRALFATE 1 G/1
1 TABLET ORAL EVERY 6 HOURS
Status: DISCONTINUED | OUTPATIENT
Start: 2021-09-13 | End: 2021-09-15 | Stop reason: HOSPADM

## 2021-09-13 RX ORDER — AZITHROMYCIN 250 MG/1
500 TABLET, FILM COATED ORAL DAILY
Status: COMPLETED | OUTPATIENT
Start: 2021-09-13 | End: 2021-09-15

## 2021-09-13 RX ORDER — FAMOTIDINE 20 MG/1
20 TABLET, FILM COATED ORAL 2 TIMES DAILY
Status: DISCONTINUED | OUTPATIENT
Start: 2021-09-13 | End: 2021-09-15 | Stop reason: HOSPADM

## 2021-09-13 RX ORDER — LEVETIRACETAM 500 MG/1
750 TABLET ORAL 2 TIMES DAILY
Status: DISCONTINUED | OUTPATIENT
Start: 2021-09-13 | End: 2021-09-15 | Stop reason: HOSPADM

## 2021-09-13 RX ORDER — PROCHLORPERAZINE EDISYLATE 5 MG/ML
10 INJECTION INTRAMUSCULAR; INTRAVENOUS EVERY 6 HOURS PRN
Status: DISCONTINUED | OUTPATIENT
Start: 2021-09-13 | End: 2021-09-15 | Stop reason: HOSPADM

## 2021-09-13 RX ORDER — ONDANSETRON 2 MG/ML
4 INJECTION INTRAMUSCULAR; INTRAVENOUS EVERY 4 HOURS PRN
Status: DISCONTINUED | OUTPATIENT
Start: 2021-09-13 | End: 2021-09-15 | Stop reason: HOSPADM

## 2021-09-13 RX ORDER — LEVETIRACETAM 500 MG/1
1000 TABLET ORAL 2 TIMES DAILY
Status: DISCONTINUED | OUTPATIENT
Start: 2021-09-13 | End: 2021-09-13

## 2021-09-13 RX ADMIN — SALINE NASAL SPRAY 2 SPRAY: 1.5 SOLUTION NASAL at 02:25

## 2021-09-13 RX ADMIN — SERTRALINE HYDROCHLORIDE 100 MG: 50 TABLET ORAL at 06:05

## 2021-09-13 RX ADMIN — HYDROCODONE BITARTRATE AND ACETAMINOPHEN 1 TABLET: 10; 325 TABLET ORAL at 09:28

## 2021-09-13 RX ADMIN — MYCOPHENOLATE MOFETIL 250 MG: 250 CAPSULE ORAL at 06:06

## 2021-09-13 RX ADMIN — MYCOPHENOLATE MOFETIL 250 MG: 250 CAPSULE ORAL at 17:30

## 2021-09-13 RX ADMIN — LACOSAMIDE 100 MG: 50 TABLET, FILM COATED ORAL at 06:04

## 2021-09-13 RX ADMIN — CALCIUM CARBONATE (ANTACID) CHEW TAB 500 MG 1000 MG: 500 CHEW TAB at 06:04

## 2021-09-13 RX ADMIN — FLUDROCORTISONE ACETATE 0.2 MG: 0.1 TABLET ORAL at 06:05

## 2021-09-13 RX ADMIN — SUCRALFATE 1 G: 1 TABLET ORAL at 17:31

## 2021-09-13 RX ADMIN — ALPRAZOLAM 1 MG: 0.5 TABLET ORAL at 21:53

## 2021-09-13 RX ADMIN — HYDROCODONE BITARTRATE AND ACETAMINOPHEN 1 TABLET: 10; 325 TABLET ORAL at 16:14

## 2021-09-13 RX ADMIN — TACROLIMUS 2 MG: 1 CAPSULE ORAL at 06:05

## 2021-09-13 RX ADMIN — CALCIUM CARBONATE (ANTACID) CHEW TAB 500 MG 1000 MG: 500 CHEW TAB at 17:31

## 2021-09-13 RX ADMIN — LEVOTHYROXINE SODIUM 137 MCG: 25 TABLET ORAL at 06:06

## 2021-09-13 RX ADMIN — HYDROCODONE BITARTRATE AND ACETAMINOPHEN 1 TABLET: 10; 325 TABLET ORAL at 23:04

## 2021-09-13 RX ADMIN — PROCHLORPERAZINE EDISYLATE 10 MG: 5 INJECTION INTRAMUSCULAR; INTRAVENOUS at 11:27

## 2021-09-13 RX ADMIN — FAMOTIDINE 20 MG: 20 TABLET ORAL at 17:31

## 2021-09-13 RX ADMIN — LACOSAMIDE 100 MG: 50 TABLET, FILM COATED ORAL at 17:32

## 2021-09-13 RX ADMIN — LEVETIRACETAM 750 MG: 500 TABLET ORAL at 17:31

## 2021-09-13 RX ADMIN — SUCRALFATE 1 G: 1 TABLET ORAL at 11:26

## 2021-09-13 RX ADMIN — TADALAFIL 20 MG: 20 TABLET ORAL at 18:00

## 2021-09-13 RX ADMIN — TACROLIMUS 2 MG: 1 CAPSULE ORAL at 17:31

## 2021-09-13 RX ADMIN — ALPRAZOLAM 1 MG: 0.5 TABLET ORAL at 13:33

## 2021-09-13 RX ADMIN — PRAVASTATIN SODIUM 20 MG: 20 TABLET ORAL at 06:05

## 2021-09-13 RX ADMIN — GABAPENTIN 100 MG: 100 CAPSULE ORAL at 06:06

## 2021-09-13 RX ADMIN — ALPRAZOLAM 1 MG: 0.5 TABLET ORAL at 06:21

## 2021-09-13 RX ADMIN — SUCRALFATE 1 G: 1 TABLET ORAL at 23:04

## 2021-09-13 RX ADMIN — AMBRISENTAN 10 MG: 10 TABLET, FILM COATED ORAL at 06:00

## 2021-09-13 RX ADMIN — GABAPENTIN 100 MG: 100 CAPSULE ORAL at 17:31

## 2021-09-13 RX ADMIN — POTASSIUM CHLORIDE 40 MEQ: 1500 TABLET, EXTENDED RELEASE ORAL at 06:06

## 2021-09-13 RX ADMIN — LEVETIRACETAM 1500 MG: 500 TABLET ORAL at 06:04

## 2021-09-13 RX ADMIN — AZITHROMYCIN MONOHYDRATE 500 MG: 250 TABLET ORAL at 13:28

## 2021-09-13 ASSESSMENT — ENCOUNTER SYMPTOMS
ORTHOPNEA: 0
NERVOUS/ANXIOUS: 0
SPEECH CHANGE: 0
NECK PAIN: 0
PHOTOPHOBIA: 0
VOMITING: 0
HEADACHES: 1
PALPITATIONS: 0
HEARTBURN: 0
BACK PAIN: 0
DOUBLE VISION: 0
HEMOPTYSIS: 0
CHILLS: 0
BRUISES/BLEEDS EASILY: 0
NAUSEA: 0
DIARRHEA: 1
FEVER: 0
POLYDIPSIA: 0
FOCAL WEAKNESS: 0
ABDOMINAL PAIN: 1
BLURRED VISION: 0
HALLUCINATIONS: 0
SPUTUM PRODUCTION: 0
DIZZINESS: 1
FLANK PAIN: 0
WEIGHT LOSS: 0
TREMORS: 0
COUGH: 0

## 2021-09-13 ASSESSMENT — PAIN DESCRIPTION - PAIN TYPE
TYPE: ACUTE PAIN
TYPE: ACUTE PAIN;CHRONIC PAIN
TYPE: ACUTE PAIN;CHRONIC PAIN
TYPE: ACUTE PAIN
TYPE: ACUTE PAIN

## 2021-09-13 ASSESSMENT — COGNITIVE AND FUNCTIONAL STATUS - GENERAL
SUGGESTED CMS G CODE MODIFIER DAILY ACTIVITY: CJ
PERSONAL GROOMING: A LITTLE
TOILETING: A LITTLE
HELP NEEDED FOR BATHING: A LITTLE
DRESSING REGULAR LOWER BODY CLOTHING: A LITTLE
DAILY ACTIVITIY SCORE: 20

## 2021-09-13 ASSESSMENT — GAIT ASSESSMENTS: DISTANCE (FEET): 20

## 2021-09-13 ASSESSMENT — ACTIVITIES OF DAILY LIVING (ADL): TOILETING: REQUIRES ASSIST

## 2021-09-13 ASSESSMENT — LIFESTYLE VARIABLES: SUBSTANCE_ABUSE: 0

## 2021-09-13 NOTE — PROGRESS NOTES
Ankur from Lab called with critical result of Stool Culture Positive for Campylobacter at 1220. Critical lab result read back to Ankur.   Dr. Gloria notified of critical lab result at 1221 via Voalte messaging.

## 2021-09-13 NOTE — PROGRESS NOTES
Telemetry Shift Summary    Rhythm SR  HR Range 72 - 98  Ectopy r-oPACS & rPVCs  Measurements 0.16/0.08/0.36        Normal Values  Rhythm SR  HR Range    Measurements 0.12-0.20 / 0.06-0.10  / 0.30-0.52

## 2021-09-13 NOTE — DISCHARGE PLANNING
Daughter called back and tells me that her Mother is agreeable to transfer to [facility] if approved. Call out to [facility] for consideration. Insurance has authorized IRF if Roxana still require therapy when medically cleared. Will follow.

## 2021-09-13 NOTE — THERAPY
Occupational Therapy   Initial Evaluation     Patient Name: Roxana Cuba  Age:  61 y.o., Sex:  female  Medical Record #: [id number]  Today's Date: 9/13/2021     Precautions  Precautions: Fall Risk, Sling Right Upper Extremity (protective isolation)  Comments: R elbow fx, assume NWB RUE    Assessment  Patient is 61 y.o. female with a diagnosis of falls, weakness. hypokalemia.  Additional factors influencing patient status / progress: Pt with h/o seizure d/o, liver transplant, sarcoidosis on 3L O2 at baseline, gastric bypass surgery, pulmonary HTN.  Pt with several falls recently resulting in SDH that required craniotomy, and recently R elbow fracture stabilized in brace.  Pt was hesitant to participate in OT eval thinking she would be forced to walk outside of her room and perform tasks she has not been able to do prior to admit.  At this time pt req CGA for walking in the room (since she cannot use a walker with broken elbow) and she was doing this with spouse at home. She req Sol for toileting.  Spouse helps her with toileting and bathing as needed and they have been managing her self care at home for a few weeks since elbow was broken.  She reports falls are due to seizures which she can't control, and tripping on O2 cord which we reviewed strategies for safe O2 cord mgmt.  At this time, pt does not appear to need further OT in this setting.  She will benefit from HH therapy and nursing at home, and assist from family as they have been previously.  She also would benefit from a bedside commode for home.  Patient will not be actively followed for occupational therapy services at this time, however may be seen if requested by physician for 1 more visit within 30 days to address any discharge or equipment needs.     Plan    Recommend Occupational Therapy for Evaluation only     DC Equipment Recommendations: Bed Side Commode  Discharge Recommendations: Recommend home health for continued occupational therapy  "services     Subjective    \"Oh God, I've been dreading seeing you.  This is pointless.\"        09/13/21 1220   Prior Living Situation   Prior Services Intermittent Physical Support for ADL Per Family;Skilled Home Health Services   Housing / Facility 1 Story Apartment / Condo   Steps Into Home 0   Steps In Home 0   Bathroom Set up Bathtub / Shower Combination;Shower Chair   Equipment Owned Front-Wheel Walker;Tub / Shower Seat;Oxygen   Lives with - Patient's Self Care Capacity Spouse   Comments Spouse home and able to assist.  Son and Dtr live nearby and can provide some assist as well   Prior Level of ADL Function   Self Feeding Independent   Grooming / Hygiene Requires Assist   Bathing Requires Assist   Dressing Requires Assist   Toileting Requires Assist   Comments Pt has required intermittent assist at times from spouse in the recent past due to recovery from SDH as well as now recovery from R elbow fx.  Spouse provides assist as needed for all ADl's including toileting.   Prior Level of IADL Function   Medication Management Requires Assist   Laundry Requires Assist   Kitchen Mobility Independent  (pt reports spouse can't cook)   Finances Requires Assist   Home Management Requires Assist   Shopping Requires Assist   Prior Level Of Mobility Independent With Device in Home  (normally with FWW, now HHA 2/2 RUE broken elbow)   Driving / Transportation Relatives / Others Provide Transportation   Occupation (Pre-Hospital Vocational) Retired Due To Disability   Leisure Interests Unable To Determine At This Time   History of Falls   History of Falls Yes   Date of Last Fall 09/10/21  (several falls 2/2 seizures and O2 cord tripping)   Precautions   Precautions Fall Risk;Sling Right Upper Extremity  (protective isolation)   Comments R elbow fx, assume NWB RUE   Vitals   O2 (LPM) 3   O2 Delivery Device Nasal Cannula   Vitals Comments pt on O2 at home at baseline   Pain 0 - 10 Group   Location Arm;Elbow   Location Orientation " "Right   Description Aching;Throbbing   Therapist Pain Assessment 4;During Activity;Nurse Notified  (pre-medicated)   Cognition    Cognition / Consciousness X   Level of Consciousness Alert   Comments Pt appears more alert today, anxious, resistant to therapy, describes several falls at home due to seizures/ O2 cord.  She states when arm was not broken she used FWW.  She states she feels she is doing the best tiff can right now and some things (like seizures) are out of her control.   Active ROM Upper Body   Active ROM Upper Body  X   Dominant Hand Right   Comments KISHOR immobilized in sling- She reports she was given instruction to let elbow passively extend 4 times/day   Strength Upper Body   Upper Body Strength  X   Comments RUE NT, LUE slightly gen weakness   Balance Assessment   Sitting Balance (Static) Fair +   Sitting Balance (Dynamic) Fair +   Standing Balance (Static) Fair   Standing Balance (Dynamic) Fair   Weight Shift Sitting Good   Weight Shift Standing Fair   Comments Hand held assist, (CGA) to walk around in room, close SBA transfers to commode at bedside   Bed Mobility    Supine to Sit Modified Independent   Sit to Supine Modified Independent   Scooting Supervised   ADL Assessment   Eating Independent   Grooming Supervision;Seated   Upper Body Dressing Minimal Assist   Lower Body Dressing Minimal Assist  (briefs)   Toileting Supervision  (setup with cloth for hygiene)   Functional Mobility   Sit to Stand Supervised   Bed, Chair, Wheelchair Transfer Minimal Assist  (CGA)   Toilet Transfers Minimal Assist  (to BSC, refused toilet in BR)   Tub / Shower Transfers   (does sponge baths)   Mobility HHA to walk a [place] in room within the limits of O2 cord.  Refused longer cord and states \"I'm NOT going out in that hallway\"   Distance (Feet) 20   # of Times Distance was Traveled 1   Comments has to use commode freq 2/2 loose BM from seizure meds   Visual Perception   Visual Perception  WDL   Activity Tolerance "   Sitting in Chair 3 min commode   Sitting Edge of Bed 15 min   Standing 3 min x3   Patient / Family Goals   Patient / Family Goal #1 home with HH   Education Group   Education Provided Role of Occupational Therapist;Activities of Daily Living;Adaptive Equipment;Home Safety   Role of Occupational Therapist Patient Response Patient;Acceptance;Explanation;Verbal Demonstration   Home Safety Patient Response Patient;Acceptance;Explanation;Verbal Demonstration   ADL Patient Response Patient;Acceptance;Explanation;Verbal Demonstration   Adaptive Equipment Patient Response Patient;Acceptance;Explanation;Verbal Demonstration   Additional Comments Pt educated on need for BSC due to BM urgency, and need for hand held assist when walking at home

## 2021-09-13 NOTE — PROGRESS NOTES
Telemetry Shift Summary    Rhythm SR/ST  HR Range 60s-100s  Ectopy rare PVC  Measurements 0.16/0.08/0.38        Normal Values  Rhythm SR  HR Range    Measurements 0.12-0.20 / 0.06-0.10  / 0.30-0.52

## 2021-09-13 NOTE — CONSULTS
"  Consult Note: Hematology/Oncology    Date of consultation: 9/13/2021 4:04 PM    Referring provider: Carlton    Reason for consultation: Nuetropenia      History of presenting illness:     Mrs. Cuba is a 61 Y  woman with a history of Liver trasnplant on tacrolimus known to Dr. Reza for mild MGUS who was under regular survillence until JAN 2019. Patient went to [facility] in early 2019 and was stuck there due to COVID-19. Patient did not follow up with Dr Reza since Jan 2019. While she was in [facility] patient has at least two falls syncope was admitted to hospital diagnosed with seizure disorder and was put on high doses of Keppra. Patient came back to [place] AUG 1 2021 and during first week of August she had another syncope fall hit her head and had subdural hematoma and was admitted to [facility] underwent craniotomy. Patient most recent was taking 1500 mg BID of keprra.     This admission was again for syncope and fall while in hospital she developed diarrhoea c diff negative. Patient WBC trended gradually from 5K to 1.7 today with . HB 9.5 and platelets dropped 90K. Patient previous baseline of platelets 80K and on off increase up to 200K is seen in chart.     Currently patient Is seen in room. diarrhoea better. Rt arm fracture. No pain. Headaches+. No fever no cough. Patient is on 3 lit Oxygen for many years due to Pulm HTN she never smoked. She has sarcoidosis.     Past Medical History:    Past Medical History:   Diagnosis Date   • Anemia    • Anesthesia     \"takes more to get me under, would rather be intubated\"    • Breath shortness     cont oxygen 5L (Preffered)   • Bronchitis      2016   • Cardiomegaly    • Chronic fatigue and malaise    • Cirrhosis (HCC) December 2011    Status post liver transplant at [facility]   • CKD (chronic kidney disease) stage 3, GFR 30-59 ml/min (HCC)    • Diabetes (HCC)     reports hx of, resolved w/weight loss. Reports still checking bloodsugars twice daily and using " "insulin PRN   • Fracture of left foot    • GERD (gastroesophageal reflux disease)         • H/O Clostridium difficile infection 02-17-17    reports \"16 months ago\". Current stool sample 01-26-17 neg   • Hemorrhagic disorder (HCC)     \"low platelets\" bruises easily    • Hiatus hernia syndrome    • High cholesterol    • Hypothyroid    • Jaundice 2009   • Liver transplanted (HCC)    • Low back pain 02-17-17    and left foot, 7/10   • Mild aortic regurgitation and aortic valve sclerosis     • On home oxygen therapy     5 liters, Dr. Gaming   • Platelet disorder (HCC)     low platelets   • Pneumonia     aspiration,    • Psychiatric disorder     Mood disorder   • Pulmonary hypertension (HCC)        • S/P cholecystectomy    • Small bowel obstruction (HCC)     01-   • Splenomegaly    • VRE (vancomycin-resistant Enterococci)     02-17-17, pt declines knowledge of this       Past surgical history:    Past Surgical History:   Procedure Laterality Date   • CRANIOTOMY Left 8/4/2021    Procedure: CRANIOTOMY;  Surgeon: Tramaine Arnold III, M.D.;  Location: SURGERY [facility];  Service: Neurosurgery   • VENTRAL HERNIA REPAIR ROBOTIC XI N/A 11/12/2018    Procedure: VENTRAL HERNIA REPAIR ROBOTIC XI- FOR EPIGASTRIC INCISIONAL;  Surgeon: John H Ganser, M.D.;  Location: [facility];  Service: General   • TURBINATE REDUCTION Bilateral 10/4/2018    Procedure: TURBINATE REDUCTION;  Surgeon: Silviano Erazo M.D.;  Location: SURGERY SAME DAY [facility];  Service: Ent   • NASAL RECONSTRUCTION Bilateral 10/4/2018    Procedure: NASAL RECONSTRUCTION- LATERA IMPLANTS;  Surgeon: Silviano Erazo M.D.;  Location: SURGERY SAME DAY [facility];  Service: Ent   • OTHER  12/11/2017    paniculectomy   • COLONOSCOPY N/A 3/27/2017    Procedure: COLONOSCOPY;  Surgeon: Osman Matt M.D.;  Location: SURGERY SAME DAY [facility];  Service:    • GASTROSCOPY N/A 3/27/2017    Procedure: GASTROSCOPY;  Surgeon: Osman Matt, " M.D.;  Location: SURGERY SAME DAY [facility];  Service:    • BREAST BIOPSY Left 2/21/2017    Procedure: BREAST BIOPSY - WIRE LOCALIZED EXCISONAL ;  Surgeon: Jane Zhao M.D.;  Location: SURGERY SAME DAY [facility];  Service:    • LUNG BIOPSY OPEN  11/2016   • OTHER ABDOMINAL SURGERY      Gasric Sleeve   • BONE MARROW ASPIRATION  3/16/2015    Performed by Marlena iH M.D. at ENDOSCOPY [facility] [place]   • BONE MARROW BIOPSY, NDL/TROCAR  3/16/2015    Performed by Marlena Hi M.D. at ENDOSCOPY [facility] [place]   • RECOVERY  3/31/2014    Performed by [facility] at SURGERY [facility]   • RECOVERY  3/24/2014    Performed by [facility] at SURGERY SAME DAY [facility]   • RECOVERY  1/21/2014    Performed by [facility] at SURGERY SAME DAY [facility]   • BRONCHOSCOPY-ENDO  11/15/2013    Performed by Ha Perez M.D. at ENDOSCOPY [facility] [place]   • RECOVERY  2/27/2013    Performed by [facility] at SURGERY SAME DAY [facility]   • BONE MARROW ASPIRATION  12/31/2012    Performed by Josemanuel Real M.D. at ENDOSCOPY [facility] [place]   • BONE MARROW BIOPSY, NDL/TROCAR  12/31/2012    Performed by Josemanuel Real M.D. at ENDOSCOPY [facility]   • GASTROSCOPY  9/28/2012    Performed by Aravind Richards M.D. at SURGERY [facility]   • SIGMOIDOSCOPY FLEX  9/26/2012    Performed by Kristopher Cardoso M.D. at ENDOSCOPY [facility] [place]   • BRONCHOSCOPY-ENDO  6/19/2012    Performed by MARLENA MURILLO at ENDOSCOPY [facility] [place]   • BRONCHOSCOPY-ENDO  5/29/2012    Performed by SUYAPA CAMARENA at ENDOSCOPY [facility] [place]   • OTHER ABDOMINAL SURGERY  December 2011    Liver transplant at [facility] by Dr. [place].   • GASTROSCOPY-ENDO  3/12/2010    Performed by CAMELIA SAMANO at [facility]   • EXAM UNDER ANESTHESIA  11/11/2009    Performed by BIRD ABDI at SURGERY [facility]   • HEMORRHOIDECTOMY  11/11/2009    Performed by  BIRD ABDI at SURGERY [facility]   • KELBY BY LAPAROSCOPY  9/19/2009    Performed by BIRD ABDI at SURGERY [facility]   • CARPAL TUNNEL RELEASE          • KELBY BY LAPAROSCOPY     • GASTRIC BYPASS LAPAROSCOPIC     • HERNIA REPAIR      x3   • HYSTERECTOMY, TOTAL ABDOMINAL          • OTHER      Breast reduction   • OTHER      liver transplant   • PB ANESTH,NOSE,SINUS SURGERY      x4   • TONSILLECTOMY         Allergies:  Rhubarb, Organic nitrates, Other drug, Vancomycin hcl, and Nsaids    Medications:    Current Facility-Administered Medications   Medication Dose Route Frequency Provider Last Rate Last Admin   • levETIRAcetam (KEPPRA) tablet 750 mg  750 mg Oral BID Fredrick Xiao M.D.       • ondansetron (ZOFRAN) syringe/vial injection 4 mg  4 mg Intravenous Q4HRS PRN Fredrick Xiao M.D.       • prochlorperazine (COMPAZINE) injection 10 mg  10 mg Intravenous Q6HRS PRN Fredrick Xiao M.D.   10 mg at 09/13/21 1127   • sucralfate (CARAFATE) tablet 1 g  1 g Oral Q6HRS Fredrick Xiao M.D.   1 g at 09/13/21 1126   • famotidine (PEPCID) tablet 20 mg  20 mg Oral BID Fredrick Xiao M.D.       • azithromycin (ZITHROMAX) tablet 500 mg  500 mg Oral DAILY Fredrick Xiao M.D.   500 mg at 09/13/21 1328   • calcium carbonate (TUMS) chewable tab 1,000 mg  1,000 mg Oral BID Fredrick Xiao M.D.   1,000 mg at 09/13/21 0604   • potassium chloride SA (Kdur) tablet 40 mEq  40 mEq Oral DAILY Fredrick Xiao M.D.   40 mEq at 09/13/21 0606   • loperamide (IMODIUM) capsule 2 mg  2 mg Oral 4X/DAY PRN Fredrick Xiao M.D.       • sodium chloride (OCEAN) 0.65 % nasal spray 2 Spray  2 Spray Nasal Q2HRS PRN Stephanie Farias M.D.   2 [place] at 09/13/21 0225   • sertraline (Zoloft) tablet 100 mg  100 mg Oral DAILY Stephanie Farias M.D.   100 mg at 09/13/21 0605   • tadalafil (CIALIS) TABS 20 mg  20 mg Oral Q EVENING Fredrick Xiao M.D.   20 mg at 09/12/21 1800   • ambrisentan (LETAIRIS) TABS 10  mg  10 mg Oral DAILY Fredrick Xiao M.D.   10 mg at 09/13/21 0600   • fludrocortisone (FLORINEF) tablet 0.2 mg  0.2 mg Oral DAILY Fredrick Xiao M.D.   0.2 mg at 09/13/21 0605   • tacrolimus (PROGRAF) capsule 2 mg  2 mg Oral BID Fredrick Xiao M.D.   2 mg at 09/13/21 0605   • levothyroxine (SYNTHROID) tablet 137 mcg  137 mcg Oral AM ES DEMETRIS Alvarez.O.   137 mcg at 09/13/21 0606   • lacosamide (VIMPAT) tablet 100 mg  100 mg Oral BID DEMETRIS Alvarez.O.   100 mg at 09/13/21 0604   • ALPRAZolam (XANAX) tablet 1 mg  1 mg Oral TID PRN DEMETRIS Alvarez.O.   1 mg at 09/13/21 1333   • [Held by provider] furosemide (LASIX) tablet 20 mg  20 mg Oral DAILY DEMETRIS Alvarez.O.   20 mg at 09/11/21 0714   • cloNIDine (CATAPRES) tablet 0.1 mg  0.1 mg Oral Q6HRS PRN DEMETRIS Alvarez.O.       • enalaprilat (Vasotec) injection 1.25 mg 1 mL  1.25 mg Intravenous Q6HRS PRN DEMETRIS Alvarez.O.       • labetalol (NORMODYNE/TRANDATE) injection 10 mg  10 mg Intravenous Q4HRS PRN DEMETRIS Alvarez.O.       • pravastatin (PRAVACHOL) tablet 20 mg  20 mg Oral DAILY DEMETRIS Alvarez.O.   20 mg at 09/13/21 0605   • mycophenolate (CELLCEPT) capsule 250 mg  250 mg Oral BID DEMETRIS Alvarez.O.   250 mg at 09/13/21 0606   • gabapentin (NEURONTIN) capsule 100 mg  100 mg Oral BID DEMETRIS Alvarez.O.   100 mg at 09/13/21 0606   • HYDROcodone/acetaminophen (NORCO)  MG per tablet 1 Tablet  1 Tablet Oral Q6HRS PRN DEMETRIS Alvarez.O.   1 Tablet at 09/13/21 0928       Social History:     Social History     Socioeconomic History   • Marital status:      Spouse name: Not on file   • Number of children: Not on file   • Years of education: Not on file   • Highest education level: Not on file   Occupational History   • Not on file   Tobacco Use   • Smoking status: Never Smoker   • Smokeless tobacco: Never Used   • Tobacco comment: avoid all tobacco products   Vaping Use   • Vaping Use: Never used   Substance and Sexual Activity    • Alcohol use: Not Currently     Alcohol/week: 0.0 oz   • Drug use: No   • Sexual activity: Not on file   Other Topics Concern   • Primary/coprimary nurse & associates Not Asked   • Family contact information Not Asked   • OK to release patient information to the following Not Asked   • Patient preferred routine/Privacy concerns Not Asked   • Patient likes and dislikes Not Asked   • Participating In Research Study Not Asked   • Miscellaneous Not Asked   Social History Narrative   • Not on file     Social Determinants of Health     Financial Resource Strain:    • Difficulty of Paying Living Expenses:    Food Insecurity:    • Worried About Running Out of Food in the Last Year:    • Ran Out of Food in the Last Year:    Transportation Needs:    • Lack of Transportation (Medical):    • Lack of Transportation (Non-Medical):    Physical Activity:    • Days of Exercise per Week:    • Minutes of Exercise per Session:    Stress:    • Feeling of Stress :    Social Connections:    • Frequency of Communication with Friends and Family:    • Frequency of Social Gatherings with Friends and Family:    • Attends Muslim Services:    • Active Member of Clubs or Organizations:    • Attends Club or Organization Meetings:    • Marital Status:    Intimate Partner Violence:    • Fear of Current or Ex-Partner:    • Emotionally Abused:    • Physically Abused:    • Sexually Abused:        Family History:     Family History   Problem Relation Age of Onset   • Other Father         Unknown (dead 10 years)   • Diabetes Father    • Heart Disease Father    • Hypertension Father    • Hyperlipidemia Father    • Stroke Father    • Non-contributory Mother    • Hyperlipidemia Mother    • Alcohol/Drug Mother    • Cancer Paternal Aunt    • Alcohol/Drug Maternal Grandmother    • Alcohol/Drug Maternal Grandfather        Review of Systems:   All other review of systems are negative except what was mentioned above in the HPI.    Constitutional: No fever,  "chills, weight loss ,malaise/fatigue.    HEENT: No new auditory or visual complaints. No sore throat and neck pain.     Respiratory:No new cough, sputum production, shortness of breath and wheezing.    Cardiovascular: No new chest pain, palpitations, orthopnea and leg swelling.    Gastrointestinal: No heartburn, nausea, vomiting ,abdominal pain, hematochezia or melena     Genitourinary: Negative for dysuria, hematuria    Musculoskeletal: No new arthralgias or myalgias   Skin: Negative for rash and itching.    Neurological: Negative for focal weakness or headaches.    Endo/Heme/Allergies: No abnormal bleed/bruise.    Psychiatric/Behavioral: No new depression/anxiety.    Physical Exam:   Vitals:   /71   Pulse 91   Temp 36.5 °C (97.7 °F) (Oral)   Resp 18   Ht 1.753 m (5' 9\")   Wt 73.9 kg (162 lb 14.7 oz)   LMP 01/03/2000   SpO2 96%   BMI 24.06 kg/m²     General: Not in acute distress, alert and oriented x 3, left craniotomy scar noted.  HEENT: No pallor, icterus. Oropharynx clear.   Neck: Supple, no palpable masses.  Lymph nodes: No palpable cervical, supraclavicular, axillary or inguinal lymphadenopathy.    CVS: regular rate and rhythm, no rubs or gallops  RESP: Clear to auscultate bilaterally, no wheezing or crackles.   ABD: Soft, non tender, non distended, positive bowel sounds, no palpable organomegaly  EXT: No edema or cyanosis RT arm SOFT cast from previous FX  CNS: Alert and oriented x3, No focal deficits.  Skin- No rash      Labs:   Recent Labs     09/11/21  0933 09/12/21  0513 09/13/21  0550   RBC 4.03* 3.16* 3.12*   HEMOGLOBIN 12.3 9.9* 9.5*   HEMATOCRIT 38.9 31.1* 31.1*   PLATELETCT 97* 81* 90*     Lab Results   Component Value Date/Time    SODIUM 141 09/13/2021 05:50 AM    POTASSIUM 4.2 09/13/2021 05:50 AM    CHLORIDE 105 09/13/2021 05:50 AM    CO2 31 09/13/2021 05:50 AM    GLUCOSE 92 09/13/2021 05:50 AM    BUN 8 09/13/2021 05:50 AM    CREATININE 0.48 (L) 09/13/2021 05:50 AM        Assessment " and Plan:    1. Pronounced Cytopenia lecopenia and thrombocytopenia  - Over baseline cytopenia likely drug to drug interaction versus any underlying infection (less likely)  - check levels of cellcept and tacrolimus  - Keppra by itself can rarely cause leucopenia  - Please check drug to drug interactions on all the drugs that patient is on..  - currently no fever no signs of sepsis.   - can use G-Csf if any signs of sepsis    2. H/o cirrhosis S/P liver transplant  - patien was on both tacrolimus and cellcept for immunosuppression  - please consult he rtransplant hepatologist for additional recommendations on decreasing or holding these medication.    3. Campylobacter an Positive diarrhoea  - ABX per primary team    4. Multiple falls/Syncope  - work up per primary team    5. Sub dural hematoma S/P craniotomy 6 weeks ago  - recent CT noted    6. H/O Sarcoidosis and pULM HTN    7. MGUS  - Mild protein increase with decreased IgG in JAN 2019, no definitive Monoclonal significant protein  - repeat MM panel   - F/u with Dr Reza as OP.           She agreed and verbalized her agreement and understanding with the current plan.  I answered all questions and concerns she has at this time.              Thank you for allowing me to participate in her care.    Please note that this dictation was created using voice recognition software. I have made every reasonable attempt to correct obvious errors, but I expect that there are errors of grammar and possibly content that I did not discover before finalizing the note.      SIGNATURES:  Jordyn Dial M.D.

## 2021-09-13 NOTE — CONSULTS
Physical Medicine and Rehabilitation Consultation  Chart Review Only               Date of initial consultation: 9/13/2021  Requesting provider: Fredrick Xiao MD   Consulting provider: Lindsay Quezada D.O.  Reason for consultation: assess for acute inpatient rehab appropriateness  LOS: 3 Day(s)    Chief complaint: Headache s/p GLF due to seizures     HPI: The patient is a 61 y.o.  female with a past medical history of liver transplant, COPD, hypertension, history of Evon-en-Y gastric bypass in 2018, MGUS, hypothyroidism sarcoidosis, history of subdural hematoma with EVD placement approximately 3 weeks ago, and respiratory failure chronically on 3 L;  who presented on 9/10/2021  5:20 PM with elbow pain and headache.  Per documentation patient has had multiple falls related to her oxygen tubing.  Per documentation, patient was visiting [facility] in approximately July 2021 or patient had a fall where she sustained a subdural hematoma.  When patient returned to the  she was evaluated at [facility] and was hospitalized from 8/3-8/17/21.  During that hospitalization, an MRI was obtained that showed a 29 mm subacute/chronic left subdural hematoma and subarachnoid hemorrhage adjacent to left frontal/temporal lobes with 6 mm left-to-right shift.  Neurosurgery was consulted and patient was taken to the OR for a left craniotomy with left subdural hematoma with membrane formation.  During that hospitalization patient's hospital course was complicated by intermittent confusion and slurred speech, at that time a repeat CT head was obtained which showed an increase in the subdural hematoma to 14 mm depth and 3.3 mm left-to-right midline shift.  On 8/8 patient had recurrent seizures and was started on Dilantin, however her Dilantin was switched to Vimpat given her history of liver transplant.  On 8/12 patient underwent bilateral middle meningeal artery embolization, and repeat CT head showed no continued evolution of her  subdural hematoma.  Patient is able to participate with therapy during that hospitalization and she was functioning at a supervision level for bed mobility as well as lower body dressing and toileting, I also see documentation from the OT note on  that there patient was refusing any kind of rehab outside of her home.  Patient was deemed safe for discharge to home with home health services.    Patient then returned to the hospital on 9/10 with complaints of a headache and elbow pain after her possible repeat fall at home and possible breakthrough seizures..  Repeat CT head was obtained on 9/10/2021 which revealed an interval decrease in postoperative left subdural collection with increased density.  Mass-effect on the underlying foci without significant midline shift.  And diffuse atrophy of periventricular white matter changes consistent with chronic small vessel disease.  Patient was found to be hypokalemic to 2.1, with thrombocytopenia.  Patient was admitted for further work-up for her significant electrolyte derangement in addition to her thrombocytopenia given her recent subdural hematomas.  During this hospitalization patient has been functioning at a min assist level for ambulation with the IV pole x20 feet, she has been functioning at a supervision level for bed mobility.       Social Hx:  Patient lives with her spouse in a one-story apartment with no stairs to enter.  Uses a front wheeled walker and needs to manage oxygen while ambulating  0 UMESH  At prior level of function patient was modified independent with a front wheeled walker.      Tobacco: Denies  Alcohol: Denies  Drugs: Denies    THERAPY:  Restrictions: Fall risk  PT: Functional mobility    PT note: Min assist with IV pole for ambulation x20 feet.  Min assist/supervision for bed mobility.    OT: ADLs  OT note pending    SLP:   No SLP notes    IMAGIN/10 CT head  IMPRESSION:     1.  Interval decrease in postoperative left subdural  "collection with decreased density. Mass effect on the underlying sulci without significant midline shift.     2.  Diffuse atrophy and periventricular white matter changes, consistent with chronic small vessel disease    PROCEDURES:  None    PMH:  Past Medical History:   Diagnosis Date   • Anemia    • Anesthesia     \"takes more to get me under, would rather be intubated\"    • Breath shortness     cont oxygen 5L (Preffered)   • Bronchitis      2016   • Cardiomegaly    • Chronic fatigue and malaise    • Cirrhosis (HCC) December 2011    Status post liver transplant at [facility]   • CKD (chronic kidney disease) stage 3, GFR 30-59 ml/min (HCC)    • Diabetes ([facility])     reports hx of, resolved w/weight loss. Reports still checking bloodsugars twice daily and using insulin PRN   • Fracture of left foot    • GERD (gastroesophageal reflux disease)         • H/O Clostridium difficile infection 02-17-17    reports \"16 months ago\". Current stool sample 01-26-17 neg   • Hemorrhagic disorder (HCC)     \"low platelets\" bruises easily    • Hiatus hernia syndrome    • High cholesterol    • Hypothyroid    • Jaundice 2009   • Liver transplanted (HCC)    • Low back pain 02-17-17    and left foot, 7/10   • Mild aortic regurgitation and aortic valve sclerosis     • On home oxygen therapy     5 liters, Dr. aGming   • Platelet disorder (HCC)     low platelets   • Pneumonia     aspiration,    • Psychiatric disorder     Mood disorder   • Pulmonary hypertension (HCC)        • S/P cholecystectomy    • Small bowel obstruction (HCC)     01-   • Splenomegaly    • VRE (vancomycin-resistant Enterococci)     02-17-17, pt declines knowledge of this       PSH:  Past Surgical History:   Procedure Laterality Date   • CRANIOTOMY Left 8/4/2021    Procedure: CRANIOTOMY;  Surgeon: Tramaine Arnold III, M.D.;  Location: SURGERY [facility];  Service: Neurosurgery   • VENTRAL HERNIA REPAIR ROBOTIC XI N/A 11/12/2018    Procedure: VENTRAL HERNIA REPAIR " ROBOTIC XI- FOR EPIGASTRIC INCISIONAL;  Surgeon: John H Ganser, M.D.;  Location: SURGERY [facility];  Service: General   • TURBINATE REDUCTION Bilateral 10/4/2018    Procedure: TURBINATE REDUCTION;  Surgeon: Silviano Erazo M.D.;  Location: SURGERY SAME DAY [facility];  Service: Ent   • NASAL RECONSTRUCTION Bilateral 10/4/2018    Procedure: NASAL RECONSTRUCTION- LATERA IMPLANTS;  Surgeon: Silviano rEazo M.D.;  Location: SURGERY SAME DAY [facility];  Service: Ent   • OTHER  12/11/2017    paniculectomy   • COLONOSCOPY N/A 3/27/2017    Procedure: COLONOSCOPY;  Surgeon: Osman Matt M.D.;  Location: SURGERY SAME DAY [facility];  Service:    • GASTROSCOPY N/A 3/27/2017    Procedure: GASTROSCOPY;  Surgeon: Osman Matt M.D.;  Location: SURGERY SAME DAY [facility];  Service:    • BREAST BIOPSY Left 2/21/2017    Procedure: BREAST BIOPSY - WIRE LOCALIZED EXCISONAL ;  Surgeon: Jane Zhao M.D.;  Location: SURGERY SAME DAY [facility];  Service:    • LUNG BIOPSY OPEN  11/2016   • OTHER ABDOMINAL SURGERY      Gasric Sleeve   • BONE MARROW ASPIRATION  3/16/2015    Performed by Freddie Hi M.D. at ENDOSCOPY [facility]   • BONE MARROW BIOPSY, NDL/TROCAR  3/16/2015    Performed by Freddie Hi M.D. at ENDOSCOPY [facility]   • RECOVERY  3/31/2014    Performed by [facility] at SURGERY [facility]   • RECOVERY  3/24/2014    Performed by [facility]Recovery Surgery at SURGERY SAME DAY [facility]   • RECOVERY  1/21/2014    Performed by [facility] at SURGERY SAME DAY [facility]   • BRONCHOSCOPY-ENDO  11/15/2013    Performed by Ha Perez M.D. at ENDOSCOPY [facility]   • RECOVERY  2/27/2013    Performed by [facility] at SURGERY SAME DAY [facility]   • BONE MARROW ASPIRATION  12/31/2012    Performed by Josemanuel Real M.D. at [facility]   • BONE MARROW BIOPSY, NDL/TROCAR  12/31/2012    Performed by Josemanuel Real M.D. at ENDOSCOPY  [facility]   • GASTROSCOPY  9/28/2012    Performed by Aravind Richards M.D. at SURGERY [facility]   • SIGMOIDOSCOPY FLEX  9/26/2012    Performed by Kristopher Cardoso M.D. at ENDOSCOPY [facility]   • BRONCHOSCOPY-ENDO  6/19/2012    Performed by MARLENA MURILLO at ENDOSCOPY [facility]   • BRONCHOSCOPY-ENDO  5/29/2012    Performed by SUYAPA CAMARENA at ENDOSCOPY [facility]   • OTHER ABDOMINAL SURGERY  December 2011    Liver transplant at [facility] by Dr. Canada.   • GASTROSCOPY-ENDO  3/12/2010    Performed by CAMELIA SAMANO at ENDOSCOPY [facility]   • EXAM UNDER ANESTHESIA  11/11/2009    Performed by BIRD ABDI at SURGERY [facility]   • HEMORRHOIDECTOMY  11/11/2009    Performed by BIRD ABDI at SURGERY [facility]   • KELBY BY LAPAROSCOPY  9/19/2009    Performed by BIRD ABDI at SURGERY [facility]   • CARPAL TUNNEL RELEASE          • KELBY BY LAPAROSCOPY     • GASTRIC BYPASS LAPAROSCOPIC     • HERNIA REPAIR      x3   • HYSTERECTOMY, TOTAL ABDOMINAL          • OTHER      Breast reduction   • OTHER      liver transplant   • PB ANESTH,NOSE,SINUS SURGERY      x4   • TONSILLECTOMY         FHX:  Family History   Problem Relation Age of Onset   • Other Father         Unknown (dead 10 years)   • Diabetes Father    • Heart Disease Father    • Hypertension Father    • Hyperlipidemia Father    • Stroke Father    • Non-contributory Mother    • Hyperlipidemia Mother    • Alcohol/Drug Mother    • Cancer Paternal Aunt    • Alcohol/Drug Maternal Grandmother    • Alcohol/Drug Maternal Grandfather        Medications:  Current Facility-Administered Medications   Medication Dose   • levETIRAcetam (KEPPRA) tablet 750 mg  750 mg   • ondansetron (ZOFRAN) syringe/vial injection 4 mg  4 mg   • prochlorperazine (COMPAZINE) injection 10 mg  10 mg   • sucralfate (CARAFATE) tablet 1 g  1 g   • calcium carbonate (TUMS) chewable tab 1,000 mg  1,000 mg   • potassium chloride SA (Kdur)  "tablet 40 mEq  40 mEq   • loperamide (IMODIUM) capsule 2 mg  2 mg   • sodium chloride (OCEAN) 0.65 % nasal spray 2 Spray  2 Spray   • sertraline (Zoloft) tablet 100 mg  100 mg   • tadalafil (CIALIS) TABS 20 mg  20 mg   • ambrisentan (LETAIRIS) TABS 10 mg  10 mg   • fludrocortisone (FLORINEF) tablet 0.2 mg  0.2 mg   • tacrolimus (PROGRAF) capsule 2 mg  2 mg   • levothyroxine (SYNTHROID) tablet 137 mcg  137 mcg   • lacosamide (VIMPAT) tablet 100 mg  100 mg   • ALPRAZolam (XANAX) tablet 1 mg  1 mg   • [Held by provider] furosemide (LASIX) tablet 20 mg  20 mg   • cloNIDine (CATAPRES) tablet 0.1 mg  0.1 mg   • enalaprilat (Vasotec) injection 1.25 mg 1 mL  1.25 mg   • labetalol (NORMODYNE/TRANDATE) injection 10 mg  10 mg   • pravastatin (PRAVACHOL) tablet 20 mg  20 mg   • mycophenolate (CELLCEPT) capsule 250 mg  250 mg   • gabapentin (NEURONTIN) capsule 100 mg  100 mg   • HYDROcodone/acetaminophen (NORCO)  MG per tablet 1 Tablet  1 Tablet       Allergies:  Allergies   Allergen Reactions   • Rhubarb Anaphylaxis   • Organic Nitrates Unspecified     Nitroglycerin products should be avoided with the use of PDE-5 inhibitors as the combination can result in severe hypotension.  RD clarified with pt: Nitrates in food are okay and pt does not want nitrates to be listed as food allergy. Pt only avoids medications with nitrates.    • Other Drug      Any medication that may interact with cyclosporine, tacrolimus, sirolimus, or prograf (due to hx of liver transplant)    • Vancomycin Hcl Unspecified     Causes red man syndrome when infused to fast, ok if slowed infusion   • Nsaids Unspecified     Can not take due to hx of liver transplant          Physical Exam:  Vitals: /71   Pulse 91   Temp 36.5 °C (97.7 °F) (Oral)   Resp 18   Ht 1.753 m (5' 9\")   Wt 73.9 kg (162 lb 14.7 oz)   SpO2 96%       ASSESSMENT:  Patient is a 61 y.o. female admitted with frequent falls status post recent subdural hematoma.    [facility] Code / " Diagnosis to Support: 0002.22 - Brain Dysfunction: Traumatic, Closed Injury    Rehabilitation: Impaired ADLs and mobility  Patient is a good candidate for inpatient rehab based on needs for PT, OT, and speech therapy, pending completed evaluations.  Patient will also benefit from family training for mobility and ADLs in addition to management for O2 tubing with ambulation.  Patient has a good discharge situation which will be home with assistance from spouse..     Barriers to transfer include: Insurance authorization, TCCs to verify disposition, medical clearance and bed availability     Additional Recommendations:  Recent subdural hematoma  -Patient hospitalized 8/3-8/17 with subdural hematoma status post craniotomy and subgaleal Hemovac placement  -Patient did not complete IRF stay after subdural hematoma, went home with home health  -Since being home patient has can continued to have frequent falls, most recent CT obtained on 9/10 shows no recurrence of subdural hematoma  -Recommend ongoing PT/OT for evaluation of strength, endurance, balance and stability in setting of frequent falls or recent subdural hematoma, would be appropriate for IRF stay    COPD  -Patient is on chronic home O2, 3 L at home  -Reportedly, patient's O2 tubing is creating increased fall risk  -Recommend patient have IRF stay in order to assist with adaptive set up for home O2 in addition to her possible impaired stability, with need to navigate O2 tubing    Electrolyte disturbances  -Patient with hypokalemia hyponatremia and hypomagnesia  -Electrolyte imbalances may be secondary to SIADH after subdural hematoma in addition to antiepileptic medications  -Recommend patient continue to have daily follow-up with labs as appropriate, would benefit from IRF stay for medical management in addition to therapy training    Disposition:   -Recommend postacute rehab in IRF setting, with 3 hours of therapy 5 days a week  -Patient high risk for repeat  frequent falls at home without significant therapy training and IRF level setting for assistance with strength, stability, balance and management with O2 tubing for oxygen needs.  -Appropriate for IRF, pending insurance approval and confirmation of family's ability to provide assistance at home.    Medical Complexity:  Subdural hematoma  Hypokalemia  COPD  Diarrhea  Hypocalcemia  Hypomagnesium  Seizure disorder  Pancytopenia  History of liver transplant  MGUS        DVT PPX: SCDs      Thank you for allowing us to participate in the care of this patient.     I spent 35 mins non face-to-face today reviewing progress notes, extensive documentation from her previous hospitalization, images, therapy notes, medications.. Visit start time 12:50, visit stop time 1:25pm  .    Lindsay Quezada D.O.   Physical Medicine and Rehabilitation     Please note that this dictation was created using voice recognition software. I have made every reasonable attempt to correct obvious errors, but there may be errors of grammar and possibly content that I did not discover before finalizing the note.

## 2021-09-13 NOTE — FACE TO FACE
Face to Face Supporting Documentation - Home Health    The encounter with this patient was in whole or in part the primary reason for home health admission.    Date of encounter:   Patient:                    MRN:                       YOB: 2021  Roxana Cuba  [id number]  1960     Home health to see patient for:  Physical Therapy evaluation and treatment and Occupational therapy evaluation and treatment    Skilled need for:  Comment: debility  Chronic subdural hematoma, frequent falls, right elbow fracture    Skilled nursing interventions to include:  Comment: PT OT    Homebound status evidenced by:  Needs the assistance of another person in order to leave the home. Leaving home requires a considerable and taxing effort. There is a normal inability to leave the home.    Community Physician to provide follow up care: Elisa Phillip M.D.     Optional Interventions? No      I certify the face to face encounter for this home health care referral meets the CMS requirements and the encounter/clinical assessment with the patient was, in whole, or in part, for the medical condition(s) listed above, which is the primary reason for home health care. Based on my clinical findings: the service(s) are medically necessary, support the need for home health care, and the homebound criteria are met.  I certify that this patient has had a face to face encounter by myself.  Fredrick Xiao M.D. - NPI: [id number]

## 2021-09-13 NOTE — DIETARY
"Nutrition services: Day 3 of admit.  Roxana Cuba is a 61 y.o. female with admitting DX of Hypokalemia.  Consult received for MST score of 3 per nutrition screen for unsure of unplanned weight loss with stated usual weight of 68 kg/150 lb, and poor appetite.    Assessment:  Height: 175.3 cm (5' 9\")  Weight: 73.9 kg (162 lb 14.7 oz) on bed scale 9/11. Weight 68 kg on stand up scale 9/10.  Body mass index is 24.06 kg/m²., BMI classification: Normal  Diet/Intake: Regular    Evaluation:   1. Current weight consistent with usual weight. Per chart review, weight has been stable for the past month. Weight at recent admission on 8/3/21 = 66.9 kg. 8/14/21 = 68.7 kg.  2. Recorded PO 50-75% x 2 meals and 25-50% x 1 meal. She ate 50-75% this morning.  3. No pressure injuries per chart review.    Malnutrition Risk: Criteria not met.    Recommendations/Plan:   1. Encourage continued intake of meals >50%.  2. Document intake of all meals as % taken in ADL's to provide interdisciplinary communication across all shifts.   3. Monitor weight.  4. Nutrition rep will continue to see patient for ongoing meal and snack preferences.             "

## 2021-09-13 NOTE — CARE PLAN
The patient is Stable - Low risk of patient condition declining or worsening    Shift Goals  Clinical Goals: Improved blood counts  Patient Goals: Pain control    Progress made toward(s) clinical / shift goals:  yes    Patient is not progressing towards the following goals: n/a    Problem: Pain - Standard  Goal: Alleviation of pain or a reduction in pain to the patient’s comfort goal  Outcome: Progressing     Problem: Knowledge Deficit - Standard  Goal: Patient and family/care givers will demonstrate understanding of plan of care, disease process/condition, diagnostic tests and medications  Outcome: Progressing     Problem: Fall Risk  Goal: Patient will remain free from falls  Outcome: Progressing

## 2021-09-13 NOTE — PROGRESS NOTES
Telemetry Shift Summary    Rhythm SB - SR  HR Range 58 - 90  Ectopy  rPACs&rPVCs  Measurements 0.16/0.08/0.46        Normal Values  Rhythm SR  HR Range    Measurements 0.12-0.20 / 0.06-0.10  / 0.30-0.52

## 2021-09-13 NOTE — CASE COMMUNICATION
Quality Review for 9.13.21 Transfer OASIS performed on by MERVAT Romo RN on 9.13, 2021:    Edits completed by MERVAT Romo RN:  1. Changed  E to na per POC

## 2021-09-13 NOTE — DISCHARGE PLANNING
[facility] Rehabilitation Transitional Care Coordination    Referral from:  Dr. Xiao   Insurance Provider on Facesheet: [facility]  Potential Rehab Diagnosis: NTBI/TBI    Chart review indicates patient on going medical management and  therapy needs to possibly meet inpatient rehab facility criteria with the goal of returning to community.    D/C support: Spouse able to help with transfer and meal prep. Local daughter      Physiatry consultation  per protocol.     Last Covid test none noted.       Spoke with daughter, her Mother had been in  [place] visiting for 2 years. Prior to returning to [place] she has a fall with a bleed in [facility] at the time non-operative. [facility] [place] [facility] poorly returned to the hospital required surgical intervention. Home with  nursing advised to seek medical evaluation and returned to the hospital.          Thank you for the referral.

## 2021-09-13 NOTE — DISCHARGE PLANNING
Anticipated Discharge Disposition: Possibly [facility]    Action: LSW completed chart review. Discussed pt in IDT rounds. Per MD, pt is cleared to d/c in 2 days. Per MD, pt wants HH. Pt has a 6-clicks score of 20.     1350: LSW received messaged from Mark Lee. Per Jesus, pt is agreeable to rehab if pt still requires rehab upon d/c.     1400: LSW received message from PENNY Pérez. Per Elisa, pt would like a call from this LSW.    1548: LSW called pt at [phone], pt's spouse answered. Pt's spouse handed phone to pt. Pt wondering why rehab is being recommended. LSW explained what the therapies recommended. Per pt, she was on service with [facility] previously. Per pt, her  provides support for her at home with ADLs when needed. Per pt, at this time, she would prefer to go home with HH and the support of her .     Barriers to Discharge: None    Plan: LSW to follow and assist as needed. LSW to send HH choice or Rehab choice depending on pt's preference at discharge.

## 2021-09-13 NOTE — PROGRESS NOTES
Hospital Medicine Daily Progress Note    Date of Service  9/13/2021    Chief Complaint/Hospital Course  61 y.o. female who presented 9/10/2021 with a history of liver transplant, subdural hematoma, elbow fracture, sarcoidosis, chronic respiratory failure on 3 L who presents with headache and elbow pain.,  complaints of generalized weakness, watery diarrhea 3-4 times a day,, multiple electrolytes abnormalities, dehydration.          Interval Problem Update  9/11 patient continues complain of headache.  According to her, she has been having recurrent falls, most recent 2 days ago.  CT head without contrast negative for acute changes, postop decrease in size of left subdural hematoma.  Ordered C. difficile and stool culture.  Ordered additional potassium and magnesium supplementation.    9/12  Patient continues having loose bowel movements.  C. difficile negative.  Ordered Imodium  Magnesium normalized, hypokalemia improved.  Calcium and ionized calcium is still low.  Ordered protective isolation due to absolute neutropenia.  Ordered rehab consult per PT OT recommendations.    9/13  Patient has been nauseous.  Diarrhea is improving . Stool is positive for Campylobacter antigen.  Ordered azithromycin due to risk of severe disease, immunocompromised state.  Calcium level is improving.  I discussed case with hematologist on call Dr Dial.  According to him, worsening of cytopenia is related either to acute infection or mycophenolate.  Recommended checking level of mycophenolate, repeat  work-up for MGUS and IgG levels, which was ordered..  ordered blood culture and UA.  Rehab consult noted.  Patient agreed for inpatient rehab.    I have personally seen and examined the patient at bedside. I discussed the plan of care with patient.    Consultants/Specialty  None    Code Status  Full Code    Disposition  Patient is not medically cleared.   Anticipate discharge to to home with close outpatient follow-up.  I have placed  the appropriate orders for post-discharge needs.    Review of Systems  Review of Systems   Constitutional: Negative for chills, fever and weight loss.   HENT: Negative for ear pain, hearing loss and tinnitus.    Eyes: Negative for blurred vision, double vision and photophobia.   Respiratory: Negative for cough, hemoptysis and sputum production.    Cardiovascular: Negative for chest pain, palpitations and orthopnea.   Gastrointestinal: Positive for abdominal pain and diarrhea. Negative for heartburn, nausea and vomiting.   Genitourinary: Negative for dysuria, flank pain, frequency and hematuria.   Musculoskeletal: Positive for joint pain. Negative for back pain and neck pain.   Skin: Negative for itching and rash.   Neurological: Positive for dizziness and headaches. Negative for tremors, speech change and focal weakness.   Endo/Heme/Allergies: Negative for environmental allergies and polydipsia. Does not bruise/bleed easily.   Psychiatric/Behavioral: Negative for hallucinations and substance abuse. The patient is not nervous/anxious.         Physical Exam  Temp:  [36.5 °C (97.7 °F)-37 °C (98.6 °F)] 36.5 °C (97.7 °F)  Pulse:  [66-91] 91  Resp:  [18] 18  BP: (127-141)/(60-73) 141/71  SpO2:  [90 %-100 %] 96 %    Physical Exam  Vitals and nursing note reviewed.   Constitutional:       General: She is not in acute distress.     Appearance: Normal appearance. She is ill-appearing.   HENT:      Head: Normocephalic and atraumatic.      Nose: Nose normal.      Mouth/Throat:      Mouth: Mucous membranes are dry.   Eyes:      Extraocular Movements: Extraocular movements intact.      Pupils: Pupils are equal, round, and reactive to light.   Cardiovascular:      Rate and Rhythm: Normal rate and regular rhythm.   Pulmonary:      Effort: Pulmonary effort is normal.      Breath sounds: Normal breath sounds.   Abdominal:      General: Abdomen is flat. There is no distension.      Tenderness: There is generalized abdominal  tenderness. There is no guarding or rebound.   Musculoskeletal:         General: No swelling.      Right elbow: Swelling and deformity present. Decreased range of motion. Tenderness present.      Cervical back: Normal range of motion and neck supple.   Skin:     General: Skin is warm and dry.   Neurological:      General: No focal deficit present.      Mental Status: She is alert and oriented to person, place, and time.   Psychiatric:         Mood and Affect: Mood normal.         Behavior: Behavior normal.         Fluids    Intake/Output Summary (Last 24 hours) at 9/13/2021 1534  Last data filed at 9/13/2021 1100  Gross per 24 hour   Intake 360 ml   Output --   Net 360 ml       Laboratory  Recent Labs     09/11/21  0933 09/12/21  0513 09/13/21  0550   WBC 2.4* 1.8* 1.7*   RBC 4.03* 3.16* 3.12*   HEMOGLOBIN 12.3 9.9* 9.5*   HEMATOCRIT 38.9 31.1* 31.1*   MCV 96.5 98.4* 99.7*   MCH 30.5 31.3 30.4   MCHC 31.6* 31.8* 30.5*   RDW 55.9* 55.7* 56.3*   PLATELETCT 97* 81* 90*   MPV 11.1 10.7 10.4     Recent Labs     09/11/21 2112 09/12/21  0513 09/13/21  0550   SODIUM 140 140 141   POTASSIUM 3.4* 3.5* 4.2   CHLORIDE 99 102 105   CO2 32 30 31   GLUCOSE 239* 102* 92   BUN 8 8 8   CREATININE 0.54 0.46* 0.48*   CALCIUM 6.7* 6.6* 7.4*                   Imaging  CT-HEAD W/O   Final Result      1.  Interval decrease in postoperative left subdural collection with decreased density. Mass effect on the underlying sulci without significant midline shift.      2.  Diffuse atrophy and periventricular white matter changes, consistent with chronic small vessel disease         DX-FOREARM RIGHT   Final Result      1.  Mildly distracted fracture of the RIGHT olecranon with overlying soft tissue swelling.   2.  No radial or ulnar shaft fracture.           Assessment/Plan  * Hypokalemia  Assessment & Plan  K 2.1. Patient reports diarrhea for a couple weeks after her increased Keppra dose. Suspect the hypokalemia is due to GI losses in addition  to K loss from lasix.  Replete potassium  Repeat potassium in a.m.     9/12 improving      Debility  Assessment & Plan  Multifactorial;  frequent falls  PT recommended placement for rehab  Rehab consult requested.  Patient agreed for inpatient rehab when medically cleared.    Campylobacter gastroenteritis  Assessment & Plan  History of C. difficile infection  C. difficile rule negative  9/13 Campylobacter antigen positive.  Will start on azithromycin IV (not p.o. due to nausea)  Symptomatic treatment  Rehydration IV/p.o.      Hypocalcemia  Assessment & Plan  IV calcium gluconate given, continue with oral calcium supplementation.  PTH elevated, vitamin D within normal limits.      Hypomagnesemia  Assessment & Plan  Treated    Seizures (HCC)- (present on admission)  Assessment & Plan  Continue home meds Vimpat and Keppra. Patient feels that the Keppra may be giving her diarrhea will evaluate.    Hypernatremia  Assessment & Plan  Possibly due to dehydration  Resolved    Pancytopenia (HCC)- (present on admission)  Assessment & Plan  History of pancytopenia follows with a hematologist.  She has no active bleeding.  She does have a noticeable decrease in her platelets from last month but this does not appear to be new for the patient.  She has received transfusions in the past.  Continue to monitor.    9/13 discussed with hematology  treatment for Campylobacter gastroenteritis  Further work-up work-up for infection. Follow-up with UA, blood culture.  Follow with mycophenolate and tacrolimus levels.  Reduce dose  if elevated  SPEP, urine electrophoresis, immunoglobulins ordered    Adrenal insufficiency (HCC)- (present on admission)  Assessment & Plan  Continue fludrocortisone.  Dose of fludrocortisone decreased to 0.2 due to concern for electrolyte abnormalities  A.m. cortisol level is within normal limits      Chronic respiratory failure with hypoxia (HCC)- (present on admission)  Assessment & Plan  On baseline 3 L  Has a  history of sarcoidosis, pulmonary hypertension, hepatopulmonary syndrome  Continue home O2    Acquired hypothyroidism- (present on admission)  Assessment & Plan  Continue home medication levothyroxine 137 MCG    Status post liver transplant Dr. [place] ([facility])- (present on admission)  Assessment & Plan  History of liver transplant in 2011 secondary to sarcoidosis.  Continue antirejection meds       VTE prophylaxis: SCDs/TEDs    I have performed a physical exam and reviewed and updated ROS and Plan today (9/13/2021). In review of yesterday's note (9/12/2021), there are no changes except as documented above.

## 2021-09-13 NOTE — PROGRESS NOTES
Telemetry Shift Summary     Rhythm: SR  Rate: 71-89  Measurements: .12/.08/.34  Ectopy (reported by Monitor Tech): rare PAC     Normal Values  Rhythm: Sinus  HR:   Measurements: 0.12-0.20/0.06-0.10/0.30-0.52

## 2021-09-14 LAB
BACTERIA STL CULT: ABNORMAL
C JEJUNI+C COLI AG STL QL: ABNORMAL
E COLI SXT1+2 STL IA: ABNORMAL
SIGNIFICANT IND 70042: ABNORMAL
SITE SITE: ABNORMAL
SOURCE SOURCE: ABNORMAL

## 2021-09-14 PROCEDURE — A9270 NON-COVERED ITEM OR SERVICE: HCPCS | Performed by: HOSPITALIST

## 2021-09-14 PROCEDURE — 700102 HCHG RX REV CODE 250 W/ 637 OVERRIDE(OP): Performed by: STUDENT IN AN ORGANIZED HEALTH CARE EDUCATION/TRAINING PROGRAM

## 2021-09-14 PROCEDURE — 770020 HCHG ROOM/CARE - TELE (206)

## 2021-09-14 PROCEDURE — 700111 HCHG RX REV CODE 636 W/ 250 OVERRIDE (IP): Performed by: STUDENT IN AN ORGANIZED HEALTH CARE EDUCATION/TRAINING PROGRAM

## 2021-09-14 PROCEDURE — A9270 NON-COVERED ITEM OR SERVICE: HCPCS | Performed by: STUDENT IN AN ORGANIZED HEALTH CARE EDUCATION/TRAINING PROGRAM

## 2021-09-14 PROCEDURE — 700102 HCHG RX REV CODE 250 W/ 637 OVERRIDE(OP): Performed by: INTERNAL MEDICINE

## 2021-09-14 PROCEDURE — 99232 SBSQ HOSP IP/OBS MODERATE 35: CPT | Performed by: STUDENT IN AN ORGANIZED HEALTH CARE EDUCATION/TRAINING PROGRAM

## 2021-09-14 PROCEDURE — 700102 HCHG RX REV CODE 250 W/ 637 OVERRIDE(OP): Performed by: HOSPITALIST

## 2021-09-14 PROCEDURE — 700111 HCHG RX REV CODE 636 W/ 250 OVERRIDE (IP): Performed by: INTERNAL MEDICINE

## 2021-09-14 PROCEDURE — A9270 NON-COVERED ITEM OR SERVICE: HCPCS | Performed by: INTERNAL MEDICINE

## 2021-09-14 RX ADMIN — SUCRALFATE 1 G: 1 TABLET ORAL at 11:27

## 2021-09-14 RX ADMIN — TADALAFIL 20 MG: 20 TABLET ORAL at 18:00

## 2021-09-14 RX ADMIN — SUCRALFATE 1 G: 1 TABLET ORAL at 05:37

## 2021-09-14 RX ADMIN — PRAVASTATIN SODIUM 20 MG: 20 TABLET ORAL at 05:37

## 2021-09-14 RX ADMIN — POTASSIUM CHLORIDE 40 MEQ: 1500 TABLET, EXTENDED RELEASE ORAL at 05:38

## 2021-09-14 RX ADMIN — LEVOTHYROXINE SODIUM 137 MCG: 25 TABLET ORAL at 05:36

## 2021-09-14 RX ADMIN — HYDROCODONE BITARTRATE AND ACETAMINOPHEN 1 TABLET: 10; 325 TABLET ORAL at 05:36

## 2021-09-14 RX ADMIN — LEVETIRACETAM 750 MG: 500 TABLET ORAL at 05:37

## 2021-09-14 RX ADMIN — FLUDROCORTISONE ACETATE 0.2 MG: 0.1 TABLET ORAL at 05:37

## 2021-09-14 RX ADMIN — PROCHLORPERAZINE EDISYLATE 10 MG: 5 INJECTION INTRAMUSCULAR; INTRAVENOUS at 18:05

## 2021-09-14 RX ADMIN — LACOSAMIDE 100 MG: 50 TABLET, FILM COATED ORAL at 05:37

## 2021-09-14 RX ADMIN — SUCRALFATE 1 G: 1 TABLET ORAL at 18:12

## 2021-09-14 RX ADMIN — HYDROCODONE BITARTRATE AND ACETAMINOPHEN 1 TABLET: 10; 325 TABLET ORAL at 11:27

## 2021-09-14 RX ADMIN — LEVETIRACETAM 750 MG: 500 TABLET ORAL at 18:12

## 2021-09-14 RX ADMIN — MYCOPHENOLATE MOFETIL 250 MG: 250 CAPSULE ORAL at 18:12

## 2021-09-14 RX ADMIN — TACROLIMUS 2 MG: 1 CAPSULE ORAL at 05:36

## 2021-09-14 RX ADMIN — AZITHROMYCIN MONOHYDRATE 500 MG: 250 TABLET ORAL at 05:37

## 2021-09-14 RX ADMIN — TACROLIMUS 2 MG: 1 CAPSULE ORAL at 18:12

## 2021-09-14 RX ADMIN — LOPERAMIDE HYDROCHLORIDE 2 MG: 2 CAPSULE ORAL at 09:32

## 2021-09-14 RX ADMIN — SERTRALINE HYDROCHLORIDE 100 MG: 50 TABLET ORAL at 05:35

## 2021-09-14 RX ADMIN — MYCOPHENOLATE MOFETIL 250 MG: 250 CAPSULE ORAL at 05:36

## 2021-09-14 RX ADMIN — PROCHLORPERAZINE EDISYLATE 10 MG: 5 INJECTION INTRAMUSCULAR; INTRAVENOUS at 09:32

## 2021-09-14 RX ADMIN — CALCIUM CARBONATE (ANTACID) CHEW TAB 500 MG 1000 MG: 500 CHEW TAB at 05:33

## 2021-09-14 RX ADMIN — LACOSAMIDE 100 MG: 50 TABLET, FILM COATED ORAL at 18:13

## 2021-09-14 RX ADMIN — GABAPENTIN 100 MG: 100 CAPSULE ORAL at 05:37

## 2021-09-14 RX ADMIN — FAMOTIDINE 20 MG: 20 TABLET ORAL at 18:12

## 2021-09-14 RX ADMIN — GABAPENTIN 100 MG: 100 CAPSULE ORAL at 18:13

## 2021-09-14 RX ADMIN — HYDROCODONE BITARTRATE AND ACETAMINOPHEN 1 TABLET: 10; 325 TABLET ORAL at 18:12

## 2021-09-14 RX ADMIN — AMBRISENTAN 10 MG: 10 TABLET, FILM COATED ORAL at 06:00

## 2021-09-14 RX ADMIN — ALPRAZOLAM 1 MG: 0.5 TABLET ORAL at 16:31

## 2021-09-14 RX ADMIN — CALCIUM CARBONATE (ANTACID) CHEW TAB 500 MG 1000 MG: 500 CHEW TAB at 18:13

## 2021-09-14 RX ADMIN — ONDANSETRON 4 MG: 2 INJECTION INTRAMUSCULAR; INTRAVENOUS at 05:38

## 2021-09-14 RX ADMIN — ALPRAZOLAM 1 MG: 0.5 TABLET ORAL at 05:36

## 2021-09-14 RX ADMIN — FAMOTIDINE 20 MG: 20 TABLET ORAL at 05:38

## 2021-09-14 ASSESSMENT — COGNITIVE AND FUNCTIONAL STATUS - GENERAL
WALKING IN HOSPITAL ROOM: A LITTLE
DRESSING REGULAR UPPER BODY CLOTHING: A LITTLE
PERSONAL GROOMING: A LITTLE
SUGGESTED CMS G CODE MODIFIER DAILY ACTIVITY: CK
MOVING TO AND FROM BED TO CHAIR: A LITTLE
TOILETING: A LITTLE
TURNING FROM BACK TO SIDE WHILE IN FLAT BAD: A LITTLE
STANDING UP FROM CHAIR USING ARMS: A LITTLE
CLIMB 3 TO 5 STEPS WITH RAILING: A LOT
MOBILITY SCORE: 17
HELP NEEDED FOR BATHING: A LITTLE
MOVING FROM LYING ON BACK TO SITTING ON SIDE OF FLAT BED: A LITTLE
SUGGESTED CMS G CODE MODIFIER MOBILITY: CK
DRESSING REGULAR LOWER BODY CLOTHING: A LITTLE
DAILY ACTIVITIY SCORE: 19

## 2021-09-14 ASSESSMENT — ENCOUNTER SYMPTOMS
VOMITING: 0
BACK PAIN: 0
PALPITATIONS: 0
SPEECH CHANGE: 0
PHOTOPHOBIA: 0
DIZZINESS: 1
COUGH: 0
BLURRED VISION: 0
ABDOMINAL PAIN: 1
HEMOPTYSIS: 0
DIARRHEA: 1
HEARTBURN: 0
BRUISES/BLEEDS EASILY: 0
HEADACHES: 1
CHILLS: 0
FOCAL WEAKNESS: 0
DOUBLE VISION: 0
TREMORS: 0
HALLUCINATIONS: 0
FLANK PAIN: 0
SPUTUM PRODUCTION: 0
ORTHOPNEA: 0
POLYDIPSIA: 0
FEVER: 0
NERVOUS/ANXIOUS: 0
NAUSEA: 0
WEIGHT LOSS: 0
NECK PAIN: 0

## 2021-09-14 ASSESSMENT — PAIN DESCRIPTION - PAIN TYPE
TYPE: ACUTE PAIN

## 2021-09-14 ASSESSMENT — LIFESTYLE VARIABLES: SUBSTANCE_ABUSE: 0

## 2021-09-14 NOTE — CASE COMMUNICATION
I agree with the changes.    ----- Message -----  From: Vicki Romo R.N.  Sent: 9/13/2021   2:31 PM PDT  To: Marie Art R.N.      Quality Review for 9.13.21 Transfer OASIS performed on by MERVAT Romo RN on 9.13, 2021:    Edits completed by MERVAT Romo RN:  1. Changed  E to na per POC

## 2021-09-14 NOTE — PROGRESS NOTES
Patient transferred to [facility] via hospital bed with transport. Report given to Jennifer FRANCIS.

## 2021-09-14 NOTE — DISCHARGE PLANNING
Follow up for post acute services chart review indicate choice is now home with outpatient support. Please reach out if change in choice. Will follow as needed. SW aware.

## 2021-09-14 NOTE — DOCUMENTATION QUERY
"                                                                         [facility]                                                                       Query Response Note      PATIENT:               VICK LI  ACCT #:                  [id number]  MRN:                     [id number]  :                      1960  ADMIT DATE:       8/3/2021 6:10 AM  DISCH DATE:        2021 10:28 AM  RESPONDING  PROVIDER #:        [id number]           QUERY TEXT:    Please clarify the etiology of the patient's new onset dysphagia and aphasia.      NOTE:  If an appropriate response is not listed below, please respond with a new note.          The patient's clinical indicators include:  Clinical indicators  Patient with symptomatic traumatic SDH sustained 5 weeks PTA  Developed aphasia and dysphagia after admission on   PNs \"Dysphagia following nontraumatic intracerebral hemorrhage\"   IR consult - so repeat head CT showed increase in SDH to 14 mm depth and 3.3 mm left to right midline shift.  Patient was transferred back to the ICU per neurosurgery.       CT-HEAD IMPRESSION:    1.Left subdural hematoma, stable since prior study.  2. 3.3 mm left right midline shift, similar compared to prior study.      Treatment and monitoring  Craniotomy for hematoma evacuation  Embolization of bilateral meningeal arteries  ICU level monitoring    Risk factors  advanced age, liver transplant, chronic respiratory failure, DM, CKD    Ceci Salazar,   [email or web address]  Options provided:   -- Worsening of traumatic SDH   -- New non-traumatic SDH, not related to traumatic SDH   -- aphasia is due to other etiology, (specify cause)   -- Unable to determine      Query created by: Slime Salazar on 2021 3:21 PM    RESPONSE TEXT:    Worsening of traumatic SDH          Electronically signed by:  ALONA JOE MD 2021 11:51 AM              "

## 2021-09-14 NOTE — PROGRESS NOTES
Patient to the floor via bed. On 3L via NC, breathing unlabored.  bedside. Alert and oriented times four, states feeling anxious. Updated on plan of care. Will medicate per MD orders.

## 2021-09-14 NOTE — CARE PLAN
The patient is Stable - Low risk of patient condition declining or worsening    Shift Goals  Clinical Goals: Control nausea/pain  Patient Goals: Discharge home with HH  Family Goals: Visit with patient    Progress made toward(s) clinical / shift goals:  yes    Patient is not progressing towards the following goals: n/a    Problem: Pain - Standard  Goal: Alleviation of pain or a reduction in pain to the patient’s comfort goal  Outcome: Progressing     Problem: Knowledge Deficit - Standard  Goal: Patient and family/care givers will demonstrate understanding of plan of care, disease process/condition, diagnostic tests and medications  Outcome: Progressing     Problem: Fall Risk  Goal: Patient will remain free from falls  Outcome: Progressing

## 2021-09-14 NOTE — PROGRESS NOTES
Telemetry Shift Summary    Rhythm SR  HR Range 65 - 79  Ectopy rPVCs  Measurements 0.16/0.08/0.42        Normal Values  Rhythm SR  HR Range    Measurements 0.12-0.20 / 0.06-0.10  / 0.30-0.52

## 2021-09-14 NOTE — PROGRESS NOTES
[facility] Medicine Daily Progress Note    Date of Service  9/14/2021    Chief Complaint/Hospital Course  61 y.o. female who presented 9/10/2021 with a history of liver transplant, subdural hematoma, elbow fracture, sarcoidosis, chronic respiratory failure on 3 L who presents with headache and elbow pain.,  complaints of generalized weakness, watery diarrhea 3-4 times a day,, multiple electrolytes abnormalities, dehydration.    Interval Problem Update  No acute events overnight.  Patient reports improved abdominal pain, denies any more diarrhea.  Patient unhappy about being forced to go to rehab, she is rather adamant about going home. If she does not fare well at home then she is open to rehab.  Pancytopenia remains stable. .  Culture pending, no signs of infection at this time. MM panel pending. Patient to follow up with her oncologist Dr Reza as outpatient.  If pancytopenia remains stable, anticipate discharge home tomorrow.    I have personally seen and examined the patient at bedside. I discussed the plan of care with patient.    Consultants/Specialty  None    Code Status  Full Code    Disposition  Patient is not medically cleared.   Anticipate discharge to to home with close outpatient follow-up.  I have placed the appropriate orders for post-discharge needs.    Review of Systems  Review of Systems   Constitutional: Negative for chills, fever and weight loss.   HENT: Negative for ear pain, hearing loss and tinnitus.    Eyes: Negative for blurred vision, double vision and photophobia.   Respiratory: Negative for cough, hemoptysis and sputum production.    Cardiovascular: Negative for chest pain, palpitations and orthopnea.   Gastrointestinal: Positive for abdominal pain and diarrhea. Negative for heartburn, nausea and vomiting.   Genitourinary: Negative for dysuria, flank pain, frequency and hematuria.   Musculoskeletal: Positive for joint pain. Negative for back pain and neck pain.   Skin: Negative for  itching and rash.   Neurological: Positive for dizziness and headaches. Negative for tremors, speech change and focal weakness.   Endo/Heme/Allergies: Negative for environmental allergies and polydipsia. Does not bruise/bleed easily.   Psychiatric/Behavioral: Negative for hallucinations and substance abuse. The patient is not nervous/anxious.         Physical Exam  Temp:  [36.4 °C (97.5 °F)-36.9 °C (98.5 °F)] 36.5 °C (97.7 °F)  Pulse:  [] 79  Resp:  [17-18] 17  BP: (116-147)/(41-80) 132/41  SpO2:  [93 %-100 %] 94 %    Physical Exam  Vitals and nursing note reviewed.   Constitutional:       General: She is not in acute distress.     Appearance: Normal appearance. She is ill-appearing.   HENT:      Head: Normocephalic and atraumatic.      Nose: Nose normal.      Mouth/Throat:      Mouth: Mucous membranes are dry.   Eyes:      Extraocular Movements: Extraocular movements intact.      Pupils: Pupils are equal, round, and reactive to light.   Cardiovascular:      Rate and Rhythm: Normal rate and regular rhythm.   Pulmonary:      Effort: Pulmonary effort is normal.      Breath sounds: Normal breath sounds.   Abdominal:      General: Abdomen is flat. There is no distension.      Tenderness: There is generalized abdominal tenderness. There is no guarding or rebound.   Musculoskeletal:         General: No swelling.      Right elbow: Swelling and deformity present. Decreased range of motion. Tenderness present.      Cervical back: Normal range of motion and neck supple.   Skin:     General: Skin is warm and dry.   Neurological:      General: No focal deficit present.      Mental Status: She is alert and oriented to person, place, and time.   Psychiatric:         Mood and Affect: Mood normal.         Behavior: Behavior normal.         Fluids    Intake/Output Summary (Last 24 hours) at 9/14/2021 1539  Last data filed at 9/14/2021 1327  Gross per 24 hour   Intake 120 ml   Output --   Net 120 ml       Laboratory  Recent  Labs     09/12/21  0513 09/13/21  0550   WBC 1.8* 1.7*   RBC 3.16* 3.12*   HEMOGLOBIN 9.9* 9.5*   HEMATOCRIT 31.1* 31.1*   MCV 98.4* 99.7*   MCH 31.3 30.4   MCHC 31.8* 30.5*   RDW 55.7* 56.3*   PLATELETCT 81* 90*   MPV 10.7 10.4     Recent Labs     09/11/21 2112 09/12/21  0513 09/13/21  0550   SODIUM 140 140 141   POTASSIUM 3.4* 3.5* 4.2   CHLORIDE 99 102 105   CO2 32 30 31   GLUCOSE 239* 102* 92   BUN 8 8 8   CREATININE 0.54 0.46* 0.48*   CALCIUM 6.7* 6.6* 7.4*                   Imaging  CT-HEAD W/O   Final Result      1.  Interval decrease in postoperative left subdural collection with decreased density. Mass effect on the underlying sulci without significant midline shift.      2.  Diffuse atrophy and periventricular white matter changes, consistent with chronic small vessel disease         DX-FOREARM RIGHT   Final Result      1.  Mildly distracted fracture of the RIGHT olecranon with overlying soft tissue swelling.   2.  No radial or ulnar shaft fracture.           Assessment/Plan  * Hypokalemia  Assessment & Plan  K 2.1. Patient reports diarrhea for a couple weeks after her increased Keppra dose. Suspect the hypokalemia is due to GI losses in addition to K loss from lasix.  Replete potassium  resolved    Debility  Assessment & Plan  Multifactorial;  frequent falls  PT recommended placement for rehab  Rehab consult requested.  Patient does not want to go to rehab, wants to go home on discharge    Campylobacter gastroenteritis  Assessment & Plan  History of C. difficile infection  C. difficile rule negative  9/13 Campylobacter antigen positive.  Will start on azithromycin IV (not p.o. due to nausea)  Symptomatic treatment  Rehydration IV/p.o.      Hypocalcemia  Assessment & Plan  IV calcium gluconate given, continue with oral calcium supplementation.  PTH elevated, vitamin D within normal limits.      Hypomagnesemia  Assessment & Plan  Treated    Seizures (HCC)- (present on admission)  Assessment &  Plan  Continue home meds Vimpat and Keppra. Patient feels that the Keppra may be giving her diarrhea will evaluate.    Hypernatremia  Assessment & Plan  Possibly due to dehydration  Resolved    Pancytopenia (HCC)- (present on admission)  Assessment & Plan  History of pancytopenia follows with a hematologist.  She has no active bleeding.  She does have a noticeable decrease in her platelets from last month but this does not appear to be new for the patient.  She has received transfusions in the past.  Continue to monitor.    9/13 discussed with hematology  treatment for Campylobacter gastroenteritis  Further work-up work-up for infection. Follow-up with UA, blood culture.  Follow with mycophenolate and tacrolimus levels.  Reduce dose  if elevated  SPEP, urine electrophoresis, immunoglobulins ordered    Adrenal insufficiency (HCC)- (present on admission)  Assessment & Plan  Continue fludrocortisone.  Dose of fludrocortisone decreased to 0.2 due to concern for electrolyte abnormalities  A.m. cortisol level is within normal limits      Chronic respiratory failure with hypoxia (HCC)- (present on admission)  Assessment & Plan  On baseline 3 L  Has a history of sarcoidosis, pulmonary hypertension, hepatopulmonary syndrome  Continue home O2    Acquired hypothyroidism- (present on admission)  Assessment & Plan  Continue home medication levothyroxine 137 MCG    Status post liver transplant Dr. [place] ([facility])- (present on admission)  Assessment & Plan  History of liver transplant in 2011 secondary to sarcoidosis.  Continue antirejection meds       VTE prophylaxis: SCDs/TEDs    I have performed a physical exam and reviewed and updated ROS and Plan today (9/14/2021). In review of yesterday's note (9/13/2021), there are no changes except as documented above.

## 2021-09-15 ENCOUNTER — PATIENT OUTREACH (OUTPATIENT)
Dept: HEALTH INFORMATION MANAGEMENT | Facility: OTHER | Age: 61
End: 2021-09-15

## 2021-09-15 ENCOUNTER — TELEPHONE (OUTPATIENT)
Dept: MEDICAL GROUP | Facility: LAB | Age: 61
End: 2021-09-15

## 2021-09-15 VITALS
RESPIRATION RATE: 18 BRPM | DIASTOLIC BLOOD PRESSURE: 79 MMHG | HEIGHT: 69 IN | OXYGEN SATURATION: 95 % | SYSTOLIC BLOOD PRESSURE: 125 MMHG | TEMPERATURE: 98.5 F | WEIGHT: 162.92 LBS | BODY MASS INDEX: 24.13 KG/M2 | HEART RATE: 82 BPM

## 2021-09-15 LAB
ERYTHROCYTE [DISTWIDTH] IN BLOOD BY AUTOMATED COUNT: 56.4 FL (ref 35.9–50)
HCT VFR BLD AUTO: 36 % (ref 37–47)
HGB BLD-MCNC: 11.1 G/DL (ref 12–16)
IGA SERPL-MCNC: 130 MG/DL (ref 68–408)
IGG SERPL-MCNC: 646 MG/DL (ref 768–1632)
IGM SERPL-MCNC: 67 MG/DL (ref 35–263)
MCH RBC QN AUTO: 31.1 PG (ref 27–33)
MCHC RBC AUTO-ENTMCNC: 30.8 G/DL (ref 33.6–35)
MCV RBC AUTO: 100.8 FL (ref 81.4–97.8)
PLATELET # BLD AUTO: 104 K/UL (ref 164–446)
PMV BLD AUTO: 10.1 FL (ref 9–12.9)
RBC # BLD AUTO: 3.57 M/UL (ref 4.2–5.4)
TACROLIMUS BLD-MCNC: 2.1 NG/ML
WBC # BLD AUTO: 2.1 K/UL (ref 4.8–10.8)

## 2021-09-15 PROCEDURE — 700102 HCHG RX REV CODE 250 W/ 637 OVERRIDE(OP): Performed by: STUDENT IN AN ORGANIZED HEALTH CARE EDUCATION/TRAINING PROGRAM

## 2021-09-15 PROCEDURE — 700102 HCHG RX REV CODE 250 W/ 637 OVERRIDE(OP): Performed by: HOSPITALIST

## 2021-09-15 PROCEDURE — A9270 NON-COVERED ITEM OR SERVICE: HCPCS | Performed by: STUDENT IN AN ORGANIZED HEALTH CARE EDUCATION/TRAINING PROGRAM

## 2021-09-15 PROCEDURE — 36415 COLL VENOUS BLD VENIPUNCTURE: CPT

## 2021-09-15 PROCEDURE — 700111 HCHG RX REV CODE 636 W/ 250 OVERRIDE (IP): Performed by: STUDENT IN AN ORGANIZED HEALTH CARE EDUCATION/TRAINING PROGRAM

## 2021-09-15 PROCEDURE — 700102 HCHG RX REV CODE 250 W/ 637 OVERRIDE(OP): Performed by: INTERNAL MEDICINE

## 2021-09-15 PROCEDURE — A9270 NON-COVERED ITEM OR SERVICE: HCPCS | Performed by: HOSPITALIST

## 2021-09-15 PROCEDURE — 700111 HCHG RX REV CODE 636 W/ 250 OVERRIDE (IP): Performed by: INTERNAL MEDICINE

## 2021-09-15 PROCEDURE — 85027 COMPLETE CBC AUTOMATED: CPT

## 2021-09-15 PROCEDURE — A9270 NON-COVERED ITEM OR SERVICE: HCPCS | Performed by: INTERNAL MEDICINE

## 2021-09-15 PROCEDURE — 99239 HOSP IP/OBS DSCHRG MGMT >30: CPT | Performed by: STUDENT IN AN ORGANIZED HEALTH CARE EDUCATION/TRAINING PROGRAM

## 2021-09-15 RX ADMIN — ONDANSETRON 4 MG: 2 INJECTION INTRAMUSCULAR; INTRAVENOUS at 06:13

## 2021-09-15 RX ADMIN — MYCOPHENOLATE MOFETIL 250 MG: 250 CAPSULE ORAL at 06:16

## 2021-09-15 RX ADMIN — AMBRISENTAN 10 MG: 10 TABLET, FILM COATED ORAL at 07:29

## 2021-09-15 RX ADMIN — POTASSIUM CHLORIDE 40 MEQ: 1500 TABLET, EXTENDED RELEASE ORAL at 06:17

## 2021-09-15 RX ADMIN — PROCHLORPERAZINE EDISYLATE 10 MG: 5 INJECTION INTRAMUSCULAR; INTRAVENOUS at 07:44

## 2021-09-15 RX ADMIN — SUCRALFATE 1 G: 1 TABLET ORAL at 11:51

## 2021-09-15 RX ADMIN — GABAPENTIN 100 MG: 100 CAPSULE ORAL at 06:20

## 2021-09-15 RX ADMIN — AZITHROMYCIN MONOHYDRATE 500 MG: 250 TABLET ORAL at 06:20

## 2021-09-15 RX ADMIN — TACROLIMUS 2 MG: 1 CAPSULE ORAL at 07:38

## 2021-09-15 RX ADMIN — FAMOTIDINE 20 MG: 20 TABLET ORAL at 06:19

## 2021-09-15 RX ADMIN — HYDROCODONE BITARTRATE AND ACETAMINOPHEN 1 TABLET: 10; 325 TABLET ORAL at 00:40

## 2021-09-15 RX ADMIN — LACOSAMIDE 100 MG: 50 TABLET, FILM COATED ORAL at 06:15

## 2021-09-15 RX ADMIN — LEVOTHYROXINE SODIUM 137 MCG: 25 TABLET ORAL at 06:20

## 2021-09-15 RX ADMIN — HYDROCODONE BITARTRATE AND ACETAMINOPHEN 1 TABLET: 10; 325 TABLET ORAL at 07:38

## 2021-09-15 RX ADMIN — FLUDROCORTISONE ACETATE 0.2 MG: 0.1 TABLET ORAL at 06:18

## 2021-09-15 RX ADMIN — CALCIUM CARBONATE (ANTACID) CHEW TAB 500 MG 1000 MG: 500 CHEW TAB at 06:21

## 2021-09-15 RX ADMIN — LEVETIRACETAM 750 MG: 500 TABLET ORAL at 06:16

## 2021-09-15 RX ADMIN — SERTRALINE HYDROCHLORIDE 100 MG: 50 TABLET ORAL at 06:21

## 2021-09-15 RX ADMIN — SUCRALFATE 1 G: 1 TABLET ORAL at 06:21

## 2021-09-15 RX ADMIN — PRAVASTATIN SODIUM 20 MG: 20 TABLET ORAL at 06:18

## 2021-09-15 RX ADMIN — SUCRALFATE 1 G: 1 TABLET ORAL at 00:40

## 2021-09-15 ASSESSMENT — PAIN DESCRIPTION - PAIN TYPE
TYPE: ACUTE PAIN

## 2021-09-15 NOTE — DISCHARGE SUMMARY
Discharge Summary    CHIEF COMPLAINT ON ADMISSION  Chief Complaint   Patient presents with   • Headache   • Elbow Pain       Reason for Admission  RIGHT ARM PAIN, POST OP HEAD SURGE*     Admission Date  9/10/2021    CODE STATUS  Full Code    HPI & HOSPITAL COURSE  This is a 61 y.o. female here with elbow pain. Patient with history of liver transplant on immunosuppresion, elbow fracture, chronic respiratory failure on 3L oxygen, who presents with generalized weakness, diarrhea, arm pain. Patient found to have campylobacter gastroenteritis, as well as hypokalemia K 2.1 from diarrhea. She was treated with azithromycin and IV fluids, as well as potassium supplementation with improvement and resolution of her symptoms. Patient incidentally found to have pancytopenia. Hematology consulted who believed pancytopenia likely due to acute infection vs home mycophenolate. Labs for evaluation of multiple myeloma ordered given patient's history of MGUS. Patient's pancytopenia improving on day of discharge, she did not require any transfusion or growth stimulating factors, lowest platelets 31, . Patient feeling well, will discharge to home with home health. She was evaluated by PT/OT and found to be a rehab candidate, however she refused rehab and SNF, opting for home health instead. She is to follow up with her PCP, orthopedic surgery for arm fracture, as well as her hematologist Dr Reza for MGUS follow up.    Therefore, she is discharged in fair and stable condition to home with close outpatient follow-up.    The patient met 2-midnight criteria for an inpatient stay at the time of discharge.    Discharge Date  9/15/2021    FOLLOW UP ITEMS POST DISCHARGE  Take medications as prescribed.  Follow up with PCP, orthopedics, hematology.    DISCHARGE DIAGNOSES  Principal Problem:    Hypokalemia POA: Unknown  Active Problems:    Status post liver transplant Dr. Canada ([facility]) POA: Yes      Overview: -December 2011: Status  post liver transplant for end stage liver disease       at [facility], followed by Dr. Canada.                Acquired hypothyroidism POA: Yes    Chronic respiratory failure with hypoxia (HCC) POA: Yes    Adrenal insufficiency (HCC) POA: Yes    Pancytopenia (HCC) POA: Yes    Hypernatremia POA: Unknown    Seizures (HCC) POA: Yes    Hypomagnesemia POA: Unknown    Hypocalcemia POA: Unknown    Campylobacter gastroenteritis POA: Unknown    Debility POA: Unknown  Resolved Problems:    * No resolved hospital problems. *      FOLLOW UP  Future Appointments   Date Time Provider Department Center   9/27/2021  2:30 PM Elisa Phillip M.D. [facility] None   10/12/2021 11:30 AM Gwyn Gaming M.D. [facility] None   10/12/2021  2:45 PM Maxwell Phan M.D. [facility] None   10/21/2021 12:30 PM Elisa Phillip M.D. [facility] None     Elisa Phillip M.D.  [address] [address]  [phone]    In 2 weeks        MEDICATIONS ON DISCHARGE     Medication List      CONTINUE taking these medications      Instructions   acetaminophen 500 MG Tabs  Commonly known as: TYLENOL   Take 500 mg by mouth 1 time a day as needed for Fever.  Dose: 500 mg     ALPRAZolam 1 MG Tabs  Commonly known as: XANAX   Take 1 mg by mouth 3 times a day as needed for Anxiety.  Dose: 1 mg     ambrisentan 10 MG tablet  Commonly known as: LETAIRIS   Take 1 Tablet by mouth every day.  Dose: 10 mg     [facility] ESOMEPRAZOLE MAGNESIUM 40 MG PO CPDR   Take 40 mg by mouth every day.  Dose: 40 mg     fludrocortisone 0.1 MG Tabs  Commonly known as: FLORINEF   Take 5 Tablets by mouth every day for 30 days.  Dose: 0.5 mg     furosemide 20 MG Tabs  Commonly known as: LASIX   Take 1 Tablet by mouth every day for 30 days.  Dose: 20 mg     gabapentin 100 MG Caps  Commonly known as: NEURONTIN   Take 1 Capsule by mouth 2 times a day for 30 days.  Dose: 100 mg     lacosamide 100 MG Tabs tablet  Commonly known as: VIMPAT   Take 1 Tablet by mouth 2 times a day for 30 days.  Dose: 100  mg     levetiracetam 750 MG tablet  Commonly known as: KEPPRA   Take 2 Tablets by Enteral Tube route 2 times a day for 30 days.  Dose: 1,500 mg     levothyroxine 137 MCG Tabs  Commonly known as: SYNTHROID   Take 1 Tablet by mouth every morning on an empty stomach.  Dose: 137 mcg     Movantik 25 MG Tabs  Generic drug: Naloxegol Oxalate   Take 25 mg by mouth every day.  Dose: 25 mg     mycophenolate 250 MG Caps  Commonly known as: CELLCEPT   Take 1 Capsule by mouth 2 times a day for 30 days.  Dose: 250 mg     oxyCODONE immediate release 10 MG immediate release tablet  Commonly known as: ROXICODONE   Take 10 mg by mouth every 6 hours as needed for Moderate Pain.  Dose: 10 mg     pravastatin 20 MG Tabs  Commonly known as: PRAVACHOL   Take 1 Tablet by mouth every day.  Dose: 20 mg     sertraline 100 MG Tabs  Commonly known as: Zoloft   Take 1 Tablet by mouth every day.  Dose: 100 mg     tacrolimus 1 MG Caps  Commonly known as: PROGRAF   Take 4 Capsules by mouth every day for 30 days.  Dose: 4 mg     tadalafil 20 MG tablet  Commonly known as: CIALIS   Take 1 Tablet by mouth at bedtime for 30 days.  Dose: 20 mg     therapeutic multivitamin-minerals Tabs   Take 1 tablet by mouth every day.  Dose: 1 Tablet            Allergies  Allergies   Allergen Reactions   • Rhubarb Anaphylaxis   • Organic Nitrates Unspecified     Nitroglycerin products should be avoided with the use of PDE-5 inhibitors as the combination can result in severe hypotension.  RD clarified with pt: Nitrates in food are okay and pt does not want nitrates to be listed as food allergy. Pt only avoids medications with nitrates.    • Other Drug      Any medication that may interact with cyclosporine, tacrolimus, sirolimus, or prograf (due to hx of liver transplant)    • Vancomycin Hcl Unspecified     Causes red man syndrome when infused to fast, ok if slowed infusion   • Nsaids Unspecified     Can not take due to hx of liver transplant        DIET  Orders Placed  This Encounter   Procedures   • Diet Order Diet: Regular     Standing Status:   Standing     Number of Occurrences:   1     Order Specific Question:   Diet:     Answer:   Regular [1]       ACTIVITY  As tolerated.  Weight bearing as tolerated    CONSULTATIONS  PM&R  Hematology/oncology    PROCEDURES  none    LABORATORY  Lab Results   Component Value Date    SODIUM 141 09/13/2021    POTASSIUM 4.2 09/13/2021    CHLORIDE 105 09/13/2021    CO2 31 09/13/2021    GLUCOSE 92 09/13/2021    BUN 8 09/13/2021    CREATININE 0.48 (L) 09/13/2021        Lab Results   Component Value Date    WBC 2.1 (LL) 09/15/2021    HEMOGLOBIN 11.1 (L) 09/15/2021    HEMATOCRIT 36.0 (L) 09/15/2021    PLATELETCT 104 (L) 09/15/2021        Total time of the discharge process exceeds 35 minutes.

## 2021-09-15 NOTE — FACE TO FACE
Face to Face Supporting Documentation - Home Health    The encounter with this patient was in whole or in part the primary reason for home health admission.    Date of encounter:   Patient:                    MRN:                       YOB: 2021  Roxana Cuba  [id number]  1960     Home health to see patient for:  Skilled Nursing care for assessment, interventions & education  Physical therapy evaluation and treatment  Occupational therapy evaluation and treatment    Skilled need for:  Recent Deterioration of Health Status due to gastroenteritis    Skilled nursing interventions to include:  Comment: physical and occupational therapy    Homebound status evidenced by:  Needs the assistance of another person in order to leave the home. Leaving home requires a considerable and taxing effort. There is a normal inability to leave the home.    Community Physician to provide follow up care: Elisa Phillip M.D.     Optional Interventions? No      I certify the face to face encounter for this home health care referral meets the CMS requirements and the encounter/clinical assessment with the patient was, in whole, or in part, for the medical condition(s) listed above, which is the primary reason for home health care. Based on my clinical findings: the service(s) are medically necessary, support the need for home health care, and the homebound criteria are met.  I certify that this patient has had a face to face encounter by myself.  Venancio Cabello M.D. - NPI: [id number]

## 2021-09-15 NOTE — DISCHARGE PLANNING
ATTN: Case Management  RE: Referral for Home Health    As of 9/15/21, we have accepted the Home Health referral for the patient listed above.    A [facility] clinician will be out to see the patient within 48 hours. If you have any questions or concerns regarding the patient’s transition to Home Health, please do not hesitate to contact us at [phone].      We look forward to collaborating with you,  [facility] Home Health Team

## 2021-09-15 NOTE — DISCHARGE PLANNING
Good morning,    This referral has been escalated to a Clinical Supervisor for review in order to determine Home Health appropriateness. It also looks like patient is currently on IV azithromycin. Please clarify if patient will DC home with Iv ABX or will be switched to oral.   This issue will be resolved as quickly as possible, but for any questions feel free to call us at [phone].  Thank you!

## 2021-09-15 NOTE — PROGRESS NOTES
Received report from night shift RN, assumed care of pt.  Pt reports nausea and pain 10/10, medicated per MAR.   Updated on plan of care for the day, answered any questions.     COVID19 surge in effect.

## 2021-09-15 NOTE — PROGRESS NOTES
Elizabeth from Lab called with critical result of WBC 2.1 at 0905. Critical lab result read back to Elizabeth.     WBC up from 1.7 to 2.1.

## 2021-09-15 NOTE — PROGRESS NOTES
Telemetry Shift Summary    Rhythm SR  HR Range 67-95  Ectopy rPVC, rPAC  Measurements 0.16/0.08/0.40        Normal Values  Rhythm SR  HR Range    Measurements 0.12-0.20 / 0.06-0.10  / 0.30-0.52

## 2021-09-15 NOTE — DISCHARGE PLANNING
Received Choice form at [phone]  Agency/Facility Name: [facility]   Referral sent per Choice form @ 0494

## 2021-09-15 NOTE — DISCHARGE INSTRUCTIONS
Discharge Instructions    Discharged to home by car with relative. Discharged via wheelchair, hospital escort: Yes.  Special equipment needed: Not Applicable    Be sure to schedule a follow-up appointment with your primary care doctor or any specialists as instructed.     Discharge Plan:   Diet Plan: Discussed  Activity Level: Discussed  Confirmed Follow up Appointment: Patient to Call and Schedule Appointment  Confirmed Symptoms Management: Discussed  Medication Reconciliation Updated: Yes    I understand that a diet low in cholesterol, fat, and sodium is recommended for good health. Unless I have been given specific instructions below for another diet, I accept this instruction as my diet prescription.   Other diet: Resume home diet     Special Instructions: None    · Is patient discharged on Warfarin / Coumadin?   No     Depression / Suicide Risk    As you are discharged from this [facility] facility, it is important to learn how to keep safe from harming yourself.    Recognize the warning signs:  · Abrupt changes in personality, positive or negative- including increase in energy   · Giving away possessions  · Change in eating patterns- significant weight changes-  positive or negative  · Change in sleeping patterns- unable to sleep or sleeping all the time   · Unwillingness or inability to communicate  · Depression  · Unusual sadness, discouragement and loneliness  · Talk of wanting to die  · Neglect of personal appearance   · Rebelliousness- reckless behavior  · Withdrawal from people/activities they love  · Confusion- inability to concentrate     If you or a loved one observes any of these behaviors or has concerns about self-harm, here's what you can do:  · Talk about it- your feelings and reasons for harming yourself  · Remove any means that you might use to hurt yourself (examples: pills, rope, extension cords, firearm)  · Get professional help from the community (Mental Health, Substance Abuse,  psychological counseling)  · Do not be alone:Call your Safe Contact- someone whom you trust who will be there for you.  · Call your local CRISIS HOTLINE 442-0965 or 749-781-1518  · Call your local Children's Mobile Crisis Response Team [place] (220) 905-4408 or www.[facility]  · Call the toll free National Suicide Prevention Hotlines   · National Suicide Prevention Lifeline 820-707-BTZK (5599)  · National Hope Line Network 800-SUICIDE (543-2201)

## 2021-09-15 NOTE — DISCHARGE PLANNING
Anticipated Discharge Disposition: Home with HH    Action: LSW completed chart review. Discussed pt in morning rounds. Per MD, pt is cleared to d/c today with HH. Pt previously on service with [facility].     LSW called pt at [phone] to confirm consent for [facility] and deliver IMM. LSW left VM requesting a call back.      Barriers to Discharge: Referral to [facility] .    Plan: LSW to follow up with pt for consent.    1025: LSW called pt's room phone and spoke with pt. Pt gave consent to send referral to [facility] . Pt gave verbal consent for IMM. LSW faxed IMM and choice form to [facility].

## 2021-09-16 LAB
IGE SERPL-ACNC: 8 KU/L
MYCOPHENOLATE SERPL LC/MS/MS-MCNC: 1 UG/ML (ref 1–3.5)
MYCOPHENOLATE-G SERPL LC/MS/MS-MCNC: 10.9 UG/ML (ref 35–100)

## 2021-09-16 NOTE — TELEPHONE ENCOUNTER
Called patient's  and Roxana's phone and LVM that I have been trying to reach them.  Otis called me yesterday and LVM for advice regarding Roxana's ER discharge.

## 2021-09-16 NOTE — TELEPHONE ENCOUNTER
tOis, Roxana's , called and LVM needing clarification regarding an rx and laborder.  When I called him back, it went to voicemail.   I reassured him on the message that I will call him back since we keep missing each other's call.

## 2021-09-18 ENCOUNTER — HOME CARE VISIT (OUTPATIENT)
Dept: HOME HEALTH SERVICES | Facility: HOME HEALTHCARE | Age: 61
End: 2021-09-18
Payer: MEDICARE

## 2021-09-18 PROCEDURE — G0493 RN CARE EA 15 MIN HH/HOSPICE: HCPCS

## 2021-09-18 PROCEDURE — 665001 SOC-HOME HEALTH

## 2021-09-18 ASSESSMENT — ENCOUNTER SYMPTOMS
PAIN LOCATION - RELIEVING FACTORS: PAIN MEDICATION
HIGHEST PAIN SEVERITY IN PAST 24 HOURS: 7/10
PAIN LOCATION: RIGHT ELBOW
PERSON REPORTING PAIN: PATIENT
LOWEST PAIN SEVERITY IN PAST 24 HOURS: 3/10
PAIN: 1
PAIN LOCATION - PAIN QUALITY: DULL, ACHY, SHARP
SUBJECTIVE PAIN PROGRESSION: UNCHANGED
ASSOCIATED SYMPTOMS: DENIES
PAIN SEVERITY GOAL: 2/10

## 2021-09-18 ASSESSMENT — FIBROSIS 4 INDEX: FIB4 SCORE: 6.94

## 2021-09-19 ENCOUNTER — HOME CARE VISIT (OUTPATIENT)
Dept: HOME HEALTH SERVICES | Facility: HOME HEALTHCARE | Age: 61
End: 2021-09-19
Payer: MEDICARE

## 2021-09-19 VITALS
RESPIRATION RATE: 18 BRPM | HEART RATE: 76 BPM | HEIGHT: 69 IN | OXYGEN SATURATION: 98 % | BODY MASS INDEX: 23.55 KG/M2 | TEMPERATURE: 97.8 F | SYSTOLIC BLOOD PRESSURE: 118 MMHG | DIASTOLIC BLOOD PRESSURE: 60 MMHG | WEIGHT: 159 LBS

## 2021-09-19 SDOH — ECONOMIC STABILITY: HOUSING INSECURITY: EVIDENCE OF SMOKING MATERIAL: 0

## 2021-09-19 ASSESSMENT — ENCOUNTER SYMPTOMS
SHORTNESS OF BREATH: T
NAUSEA: DENIES
PAIN LOCATION - PAIN SEVERITY: 5/10
VOMITING: DENIES
PAIN LOCATION - PAIN FREQUENCY: FREQUENT

## 2021-09-19 ASSESSMENT — PATIENT HEALTH QUESTIONNAIRE - PHQ9
1. LITTLE INTEREST OR PLEASURE IN DOING THINGS: 00
CLINICAL INTERPRETATION OF PHQ2 SCORE: 0
2. FEELING DOWN, DEPRESSED, IRRITABLE, OR HOPELESS: 00

## 2021-09-19 ASSESSMENT — ACTIVITIES OF DAILY LIVING (ADL): OASIS_M1830: 05

## 2021-09-20 ENCOUNTER — DOCUMENTATION (OUTPATIENT)
Dept: MEDICAL GROUP | Facility: PHYSICIAN GROUP | Age: 61
End: 2021-09-20

## 2021-09-20 LAB
ALBUMIN SERPL ELPH-MCNC: 2.04 G/DL (ref 3.75–5.01)
ALPHA1 GLOB SERPL ELPH-MCNC: 0.31 G/DL (ref 0.19–0.46)
ALPHA2 GLOB SERPL ELPH-MCNC: 0.36 G/DL (ref 0.48–1.05)
B-GLOBULIN SERPL ELPH-MCNC: 0.39 G/DL (ref 0.48–1.1)
GAMMA GLOB SERPL ELPH-MCNC: 0.6 G/DL (ref 0.62–1.51)
INTERPRETATION SERPL IFE-IMP: ABNORMAL
INTERPRETATION SERPL IFE-IMP: NORMAL
PATHOLOGY STUDY: NORMAL
PROT SERPL-MCNC: 3.7 G/DL (ref 6.3–8.2)

## 2021-09-20 RX ORDER — LEVETIRACETAM 750 MG/1
750 TABLET ORAL 2 TIMES DAILY
Qty: 120 TABLET | Refills: 0 | Status: SHIPPED | OUTPATIENT
Start: 2021-09-20 | End: 2021-10-20

## 2021-09-20 NOTE — PROGRESS NOTES
Medication chart review for [facility] services    PCP:  Elisa Phillip M.D.  [address] [address]  [place] [address]  Fax: [phone]    Current medication list     Current Outpatient Medications:   •  fludrocortisone, 0.2 mg, Oral, DAILY  •  furosemide, 20 mg, Oral, DAILY  •  gabapentin, 100 mg, Oral, BID  •  mycophenolate, 250 mg, Oral, BID  •  tadalafil, 20 mg, Oral, DAILY  •  docusate sodium, 100 mg, Oral, QDAY PRN  •  oxyCODONE immediate release, 10 mg, Oral, Q6HRS PRN  •  acetaminophen, 500 mg, Oral, QDAY PRN  •  levetiracetam, 1,500 mg, BID (Patient taking differently: 750 mg, Oral, 2 TIMES DAILY)  •  lacosamide, 100 mg, Oral, BID  •  sertraline, 100 mg, Oral, DAILY  •  tacrolimus, 4 mg, Oral, DAILY  •  ambrisentan, 10 mg, Oral, DAILY  •  levothyroxine, 137 mcg, Oral, AM ES  •  pravastatin, 20 mg, Oral, DAILY  •  ALPRAZolam, 1 mg, Oral, TID PRN  •  [facility] ESOMEPRAZOLE MAGNESIUM 40 MG PO CPDR, 40 mg, Oral, DAILY  •  Movantik, 25 mg, Oral, DAILY  •  therapeutic multivitamin-minerals, 1 Tablet, Oral, DAILY    Allergies   Allergen Reactions   • Rhubarb Anaphylaxis   • Organic Nitrates Unspecified     Nitroglycerin products should be avoided with the use of PDE-5 inhibitors as the combination can result in severe hypotension.  RD clarified with pt: Nitrates in food are okay and pt does not want nitrates to be listed as food allergy. Pt only avoids medications with nitrates.    • Other Drug      Any medication that may interact with cyclosporine, tacrolimus, sirolimus, or prograf (due to hx of liver transplant)    • Vancomycin Hcl Unspecified     Causes red man syndrome when infused to fast, ok if slowed infusion   • Nsaids Unspecified     Can not take due to hx of liver transplant        Labs     Lab Results   Component Value Date/Time    HBA1C 4.9 05/28/2019 07:35 AM          Lab Results   Component Value Date/Time    CHOLSTRLTOT 142 05/28/2019 07:35 AM    LDL 67 01/30/2019 08:34 AM    HDL 76  05/28/2019 07:35 AM    TRIGLYCERIDE 67 05/28/2019 07:35 AM       Lab Results   Component Value Date/Time    SODIUM 141 09/13/2021 05:50 AM    POTASSIUM 4.2 09/13/2021 05:50 AM    CHLORIDE 105 09/13/2021 05:50 AM    CO2 31 09/13/2021 05:50 AM    GLUCOSE 92 09/13/2021 05:50 AM    BUN 8 09/13/2021 05:50 AM    CREATININE 0.48 (L) 09/13/2021 05:50 AM     Lab Results   Component Value Date/Time    ALKPHOSPHAT 102 (H) 09/13/2021 05:50 AM    ASTSGOT 69 (H) 09/13/2021 05:50 AM    ALTSGPT 34 09/13/2021 05:50 AM    TBILIRUBIN 0.3 09/13/2021 05:50 AM    INR 0.98 08/10/2021 09:20 AM    ALBUMIN 2.04 (L) 09/13/2021 03:10 PM      Lab Results   Component Value Date/Time    RBC 3.57 (L) 09/15/2021 08:13 AM    HEMOGLOBIN 11.1 (L) 09/15/2021 08:13 AM    HEMATOCRIT 36.0 (L) 09/15/2021 08:13 AM    PLATELETCT 104 (L) 09/15/2021 08:13 AM      Lab Results   Component Value Date/Time    MALBCRT 9 01/11/2017 08:16 AM    MICROALBUR 1.2 01/11/2017 08:16 AM         Assessment and Plan:   • Received referral from [facility]. Medications reviewed.         Piero Forrest, PharmD, MS, BCACP, LCC  [facility]  Phone [phone] fax [phone]    This note was created using voice recognition software (Dragon). The accuracy of the dictation is limited by the abilities of the software. I have reviewed the note prior to signing, however some errors in grammar and context are still possible. If you have any questions related to this note please do not hesitate to contact our office.

## 2021-09-22 ENCOUNTER — HOME CARE VISIT (OUTPATIENT)
Dept: HOME HEALTH SERVICES | Facility: HOME HEALTHCARE | Age: 61
End: 2021-09-22
Payer: MEDICARE

## 2021-09-22 NOTE — CASE COMMUNICATION
Infection control case conference    Type of Infection- Stool,  POSITIVE for Campylobacter Antigen  Date Identified - 9/11/21  Timeline(Short) - at last SNV prior to hospitalization, pt vital signs WNL, denies abd pain, N/V, diarrhea.   Contributing Factors - pt is immunosuppressed due to meds s/p liver transplant.  Appropriate Education/Care Plan Visits? yes, included infection control, handwashing  For Wounds: Wound Center Patient  - n/a  Action Plan - For upcoming visits, will include teaching on hand hygiene, cleaning fruits and vegetables before ingesting, cooking foods like poultry properly (not consuming raw) and lastly being careful with puppies and cat feces .

## 2021-09-23 ENCOUNTER — TELEPHONE (OUTPATIENT)
Dept: MEDICAL GROUP | Facility: LAB | Age: 61
End: 2021-09-23

## 2021-09-23 ENCOUNTER — HOME CARE VISIT (OUTPATIENT)
Dept: HOME HEALTH SERVICES | Facility: HOME HEALTHCARE | Age: 61
End: 2021-09-23
Payer: MEDICARE

## 2021-09-23 VITALS
DIASTOLIC BLOOD PRESSURE: 72 MMHG | SYSTOLIC BLOOD PRESSURE: 124 MMHG | RESPIRATION RATE: 18 BRPM | HEART RATE: 82 BPM | TEMPERATURE: 97.9 F | OXYGEN SATURATION: 95 %

## 2021-09-23 PROCEDURE — G0495 RN CARE TRAIN/EDU IN HH: HCPCS

## 2021-09-23 RX ORDER — GABAPENTIN 100 MG/1
100 CAPSULE ORAL
COMMUNITY
End: 2021-01-01 | Stop reason: SDUPTHER

## 2021-09-23 RX ORDER — LACOSAMIDE 100 MG/1
100 TABLET ORAL
COMMUNITY
Start: 2021-09-15 | End: 2021-01-01

## 2021-09-23 SDOH — ECONOMIC STABILITY: HOUSING INSECURITY: EVIDENCE OF SMOKING MATERIAL: 0

## 2021-09-23 ASSESSMENT — ENCOUNTER SYMPTOMS
PAIN LOCATION - PAIN QUALITY: THROBBING
NAUSEA: DENIES
PAIN LOCATION - PAIN SEVERITY: 8/10
PAIN LOCATION - RELIEVING FACTORS: PAIN MED ,REST
PAIN LOCATION - PAIN FREQUENCY: CONSTANT
ASSOCIATED SYMPTOMS: DENIES
SHORTNESS OF BREATH: T
LIMITED RANGE OF MOTION: 1
PAIN LOCATION: RIGHT ELBOW
VOMITING: DENIES
PERSON REPORTING PAIN: PATIENT
SUBJECTIVE PAIN PROGRESSION: UNCHANGED
LOWEST PAIN SEVERITY IN PAST 24 HOURS: 5/10
PAIN LOCATION - EXACERBATING FACTORS: MOVEMENT
HIGHEST PAIN SEVERITY IN PAST 24 HOURS: 8/10
PAIN: 1
MUSCLE WEAKNESS: 1
PAIN SEVERITY GOAL: 0/10

## 2021-09-23 NOTE — TELEPHONE ENCOUNTER
ESTABLISHED PATIENT PRE-VISIT PLANNING     Patient was NOT contacted to complete PVP.     Note: Patient will not be contacted if there is no indication to call.     1.  Reviewed notes from the last few office visits within the medical group: Yes    2.  If any orders were placed at last visit or intended to be done for this visit (i.e. 6 mos follow-up), do we have Results/Consult Notes?         •  Labs - Labs ordered, completed on 9/13/2021 and results are in chart.  Note: If patient appointment is for lab review and patient did not complete labs, check with provider if OK to reschedule patient until labs completed.       •  Imaging - Imaging was not ordered at last office visit.       •  Referrals - Referral ordered, patient has NOT been seen.    3. Is this appointment scheduled as a Hospital Follow-Up? Yes, visit was at [facility].     4.  Immunizations were updated in Epic using Reconcile Outside Information activity? Yes    5.  Patient is due for the following Health Maintenance Topics:   Health Maintenance Due   Topic Date Due   • Annual Wellness Visit  Never done   • IMM ZOSTER VACCINES (2 of 2) 01/29/2019   • MAMMOGRAM  11/19/2020   • COVID-19 Vaccine (3 - Moderna risk 3-dose series) 05/19/2021   • IMM INFLUENZA (1) 09/01/2021         6.  AHA (Pulse8) form printed for Provider? No, patient does not have any open alerts

## 2021-09-27 ENCOUNTER — HOME CARE VISIT (OUTPATIENT)
Dept: HOME HEALTH SERVICES | Facility: HOME HEALTHCARE | Age: 61
End: 2021-09-27
Payer: MEDICARE

## 2021-09-27 ENCOUNTER — TELEPHONE (OUTPATIENT)
Dept: SLEEP MEDICINE | Facility: MEDICAL CENTER | Age: 61
End: 2021-09-27

## 2021-09-27 SDOH — ECONOMIC STABILITY: HOUSING INSECURITY: HOME SAFETY: E/PROPER USE OF EQUIPMENT, AND PRECAUTIONS.

## 2021-09-27 SDOH — ECONOMIC STABILITY: HOUSING INSECURITY
HOME SAFETY: FIRE ESCAPE PLAN DEVELOPED. PATIENT DOES NOT HAVE FLAMMABLE MATERIALS PRESENT IN THE HOME PRESENTING A FIRE HAZARD. NO EVIDENCE FOUND OF SMOKING MATERIALS PRESENT IN THE HOME.    INSTRUCT  PATIENT AND CAREGIVER IN THE USE OF HOME OXYGEN THERAPY, SAF

## 2021-09-27 NOTE — TELEPHONE ENCOUNTER
I called and left voice message on patient phone: That I have scheduled her an appointment with Dr Gaming for 10/12/21 at 11:30am. Instructed patient to call us to re-schedule if this appointment won't work for her. Note that Dr Gaming DOES NOT have openings until the New Year 2022.

## 2021-09-27 NOTE — CASE COMMUNICATION
Quality Review for 9.18.21 SHELBIE OASIS performed on by MERVAT Romo RN on 9.27, 2021:    Edits completed by MERVAT Romo RN:  1. Sent clinical narrative as an order  2. Added poor ambulation to HB status per narrative  3. Changed  to 9.18.21 date of LSOC order  4. Changed  to 3 per narrative reporting SOB with min exertion  5. Changed [id number] C-H to 3 per narrative pt needs min to mod assist with dressing  6. Added 02 template after SN had added 02 to the MAR

## 2021-09-27 NOTE — Clinical Note
I agree with the changes made  ----- Message -----  From: Vicki Romo R.N.  Sent: 9/27/2021   2:54 PM PDT  To: Radha Pollard R.N.      Quality Review for 9.18.21 SHELBIE OASIS performed on by MERVAT Romo RN on 9.27, 2021:    Edits completed by MERVAT Romo RN:  1. Sent clinical narrative as an order  2. Added poor ambulation to HB status per narrative  3. Changed  to 9.18.21 date of LSOC order  4. Changed  to 3 per narrative reporting SOB with min exertion  5. Changed [id number] C-H to 3 per narrative pt needs min to mod assist with dressing  6. Added 02 template after SN had added 02 to the MAR

## 2021-09-28 ENCOUNTER — HOME CARE VISIT (OUTPATIENT)
Dept: HOME HEALTH SERVICES | Facility: HOME HEALTHCARE | Age: 61
End: 2021-09-28
Payer: MEDICARE

## 2021-09-28 VITALS
RESPIRATION RATE: 18 BRPM | TEMPERATURE: 98 F | HEART RATE: 81 BPM | OXYGEN SATURATION: 99 % | SYSTOLIC BLOOD PRESSURE: 118 MMHG | DIASTOLIC BLOOD PRESSURE: 58 MMHG

## 2021-09-28 PROCEDURE — G0299 HHS/HOSPICE OF RN EA 15 MIN: HCPCS

## 2021-09-28 ASSESSMENT — ENCOUNTER SYMPTOMS
PERSON REPORTING PAIN: PATIENT
PAIN LOCATION - PAIN SEVERITY: 6/10
PAIN SEVERITY GOAL: 1/10
VOMITING: DENIES
HIGHEST PAIN SEVERITY IN PAST 24 HOURS: 8/10
PAIN LOCATION - PAIN DURATION: VARIES
PAIN LOCATION - PAIN QUALITY: DULL, ACHY, SHARP
PAIN LOCATION - EXACERBATING FACTORS: MOVEMENT
PAIN LOCATION - PAIN FREQUENCY: CONSTANT
LOWEST PAIN SEVERITY IN PAST 24 HOURS: 5/10
PAIN LOCATION: RIGHT ELBOW
PAIN: 1
SUBJECTIVE PAIN PROGRESSION: UNCHANGED
NAUSEA: DENIES
PAIN LOCATION - RELIEVING FACTORS: PAIN MEDICATION
ASSOCIATED SYMPTOMS: DENIES

## 2021-09-28 NOTE — CASE COMMUNICATION
I agree with the changes made.   ----- Message -----  From: Vicki Romo R.N.  Sent: 9/28/2021   6:41 AM PDT  To: Radha Pollard R.N.  Subject: RE:                                                Octavio Garcia, please try again. It did not save to the chart. Thanks Vicki  ----- Message -----  From: Radha Pollard R.N.  Sent: 9/27/2021   8:49 PM PDT  To: Vicki Romo R.N.  Subject: RE:                                              I agree with the changes made  ----- Message -----  From: Vicki Romo R.N.  Sent: 9/27/2021   2:54 PM PDT  To: Radha Pollard R.N.      Quality Review for 9.18.21 SHELBIE OASIS performed on by MERVAT Romo RN on 9.27, 2021:    Edits completed by MERVAT Romo RN:  1. Sent clinical narrative as an order  2. Added poor ambulation to HB status per narrative  3. Changed  to 9.18.21 date of LSOC order  4. Changed  to 3 per narrative reporting SOB with min exertion  5. Changed [id number] C-H to 3 per narrative pt needs min to mod assist with dressing  6. Added 02 template after SN had added 02 to the MAR

## 2021-09-28 NOTE — CASE COMMUNICATION
I agree with the changes made  ----- Message -----  From: Vicki Romo R.N.  Sent: 9/28/2021   6:41 AM PDT  To: Radha Pollard R.N.  Subject: RE:                                                Octavio Garcia, please try again. It did not save to the chart. Thanks Vicki  ----- Message -----  From: Radha Pollard R.N.  Sent: 9/27/2021   8:49 PM PDT  To: Vicki Romo R.N.  Subject: RE:                                              I agree with the changes made  ----- Message -----  From: Vicki Romo R.N.  Sent: 9/27/2021   2:54 PM PDT  To: Radha Pollard R.N.      Quality Review for 9.18.21 SHELBIE OASIS performed on by MERVAT Romo RN on 9.27, 2021:    Edits completed by MERVAT Romo RN:  1. Sent clinical narrative as an order  2. Added poor ambulation to HB status per narrative  3. Changed  to 9.18.21 date of LSOC order  4. Changed  to 3 per narrative reporting SOB with min exertion  5. Changed [id number] C-H to 3 per narrative pt needs min to mod assist with dressing  6. Added 02 template after SN had added 02 to the MAR

## 2021-09-30 ENCOUNTER — OFFICE VISIT (OUTPATIENT)
Dept: MEDICAL GROUP | Facility: LAB | Age: 61
End: 2021-09-30
Payer: MEDICARE

## 2021-09-30 VITALS
WEIGHT: 147 LBS | BODY MASS INDEX: 21.77 KG/M2 | OXYGEN SATURATION: 97 % | SYSTOLIC BLOOD PRESSURE: 110 MMHG | HEIGHT: 69 IN | RESPIRATION RATE: 16 BRPM | DIASTOLIC BLOOD PRESSURE: 70 MMHG | HEART RATE: 106 BPM | TEMPERATURE: 97.8 F

## 2021-09-30 DIAGNOSIS — R56.9 SEIZURES (HCC): ICD-10-CM

## 2021-09-30 DIAGNOSIS — J96.11 CHRONIC RESPIRATORY FAILURE WITH HYPOXIA (HCC): ICD-10-CM

## 2021-09-30 DIAGNOSIS — R11.0 NAUSEA: ICD-10-CM

## 2021-09-30 DIAGNOSIS — E27.40 ADRENAL INSUFFICIENCY (HCC): ICD-10-CM

## 2021-09-30 DIAGNOSIS — K76.81 HEPATOPULMONARY SYNDROME (HCC): ICD-10-CM

## 2021-09-30 DIAGNOSIS — F41.1 GENERALIZED ANXIETY DISORDER: ICD-10-CM

## 2021-09-30 DIAGNOSIS — Z09 HOSPITAL DISCHARGE FOLLOW-UP: ICD-10-CM

## 2021-09-30 DIAGNOSIS — F51.01 PRIMARY INSOMNIA: ICD-10-CM

## 2021-09-30 DIAGNOSIS — D61.818 PANCYTOPENIA (HCC): ICD-10-CM

## 2021-09-30 PROCEDURE — 99214 OFFICE O/P EST MOD 30 MIN: CPT | Performed by: FAMILY MEDICINE

## 2021-09-30 RX ORDER — ONDANSETRON 4 MG/1
4 TABLET, ORALLY DISINTEGRATING ORAL EVERY 8 HOURS PRN
Qty: 30 TABLET | Refills: 2 | Status: SHIPPED | OUTPATIENT
Start: 2021-09-30 | End: 2022-01-01 | Stop reason: SDUPTHER

## 2021-09-30 RX ORDER — TRAZODONE HYDROCHLORIDE 50 MG/1
50 TABLET ORAL
Qty: 901 TABLET | Refills: 3 | Status: SHIPPED | OUTPATIENT
Start: 2021-09-30 | End: 2022-01-01 | Stop reason: SDUPTHER

## 2021-09-30 ASSESSMENT — FIBROSIS 4 INDEX: FIB4 SCORE: 6.94

## 2021-09-30 NOTE — PROGRESS NOTES
Subjective:     Roxana Cuba is a 61 y.o. female here today for spittle follow-up and Annual Health Assessment.    Seizures (HCC)  Patient is on antiseizure medicine following her traumatic subdural hematoma.  This is been well controlled with Vimpat 100 mg twice a day and Keppra 750 mg twice a day.  She denies any recent seizures or loss of consciousness.    Pancytopenia (HCC)  Patient has a history of pancytopenia bethea and is followed by hematologist.  She did have notable decrease in her placed lids over the last month but denies any active bleeding.  She was recently treated for Campylobacter gastroenteritis in the hospital.    DUC (generalized anxiety disorder)  Patient reports that her anxiety is significantly worsened with her recent worsening in her health.  She was given Xanax in the hospital but she is also on narcotics for pain.  We discussed that she cannot do the 2 together because of her chronic respiratory failure.    Chronic respiratory failure with hypoxia (HCC)  Patient has a history of sarcoidosis, pulmonary hypertension and hepatopulmonary syndrome.  Continue home oxygen.    Adrenal insufficiency (HCC)  This is a chronic medical problem with a recent exacerbation.  She is currently on Florinef 0.2 milligrams daily.  She continues to feel very weak.        Health Maintenance Summary                Annual Wellness Visit Overdue 1960     IMM ZOSTER VACCINES Overdue 1/29/2019      Done 12/4/2018 Imm Admin: Zoster Vaccine Recombinant (RZV) (SHINGRIX)    MAMMOGRAM Overdue 11/19/2020      Done 11/19/2019 [id number]     Patient has more history with this topic...    COVID-19 Vaccine Overdue 5/19/2021      Done 4/21/2021 Imm Admin: Moderna SARS-CoV-2 Vaccine     Patient has more history with this topic...    IMM INFLUENZA Overdue 9/1/2021      Done 11/13/2018 Imm Admin: Influenza Vaccine Quad Inj (Pf)     Patient has more history with this topic...    IMM PNEUMOCOCCAL VACCINE: 0-64  Years Next Due 2/12/2025      Done 1/4/2017 Imm Admin: Pneumococcal polysaccharide vaccine (PPSV-23)     Patient has more history with this topic...    COLORECTAL CANCER SCREENING Next Due 3/27/2027     IMM DTaP/Tdap/Td Vaccine Next Due 4/12/2027      Done 4/12/2017 Imm Admin: Tdap Vaccine           Annual Health Assessment Questions:     1.  Are you currently engaging in any exercise or physical activity? No    2.  How would you describe your mood or emotional well-being today? sick    3.  Have you had any falls in the last year? Yes    4.  Have you noticed any problems with your balance or had difficulty walking? Yes    5.  In the last six months have you experienced any leakage of urine? No    6. DPA/Advanced Directive: Patient has Advanced Directive on file.     Current medicines (including changes today)  Current Outpatient Medications   Medication Sig Dispense Refill   • ondansetron (ZOFRAN ODT) 4 MG TABLET DISPERSIBLE Take 1 Tablet by mouth every 8 hours as needed for Nausea. 30 Tablet 2   • traZODone (DESYREL) 50 MG Tab Take 1 Tablet by mouth at bedtime. 901 Tablet 3   • Home Care Oxygen Inhale 3 L/min continuous. Oxygen dose range: 3 L/min  Respiratory route via: Nasal Cannula   Oxygen supplier: Preferred         • gabapentin (NEURONTIN) 100 MG Cap Take 100 mg by mouth.     • lacosamide (VIMPAT) 100 MG Tab tablet Take 100 mg by mouth.     • levetiracetam (KEPPRA) 750 MG tablet Take 2 Tablets by Enteral Tube route 2 times a day for 30 days. 120 Tablet 0   • fludrocortisone (FLORINEF) 0.1 MG Tab Take 0.2 mg by mouth every day. Indications: addisons disease     • furosemide (LASIX) 20 MG Tab Take 20 mg by mouth every day. Indications: Edema     • gabapentin (NEURONTIN) 100 MG Cap Take 100 mg by mouth 2 times a day. Indications: Neuropathic Pain     • mycophenolate (CELLCEPT) 250 MG Cap Take 250 mg by mouth 2 times a day. Indications: Liver Transplant Recipient     • tadalafil (CIALIS) 20 MG tablet Take 20 mg  by mouth every day.     • docusate sodium (COLACE) 100 MG Cap Take 100 mg by mouth 1 time a day as needed for Constipation.     • oxyCODONE immediate release (ROXICODONE) 10 MG immediate release tablet Take 10 mg by mouth every 6 hours as needed for Moderate Pain.     • acetaminophen (TYLENOL) 500 MG Tab Take 500 mg by mouth 1 time a day as needed for Fever.     • lacosamide (VIMPAT) 100 MG Tab tablet Take 1 Tablet by mouth 2 times a day for 30 days. 60 Tablet 0   • sertraline (ZOLOFT) 100 MG Tab Take 1 Tablet by mouth every day. 30 Tablet 0   • tacrolimus (PROGRAF) 1 MG Cap Take 4 Capsules by mouth every day for 30 days. 120 Capsule 0   • ambrisentan (LETAIRIS) 10 MG tablet Take 1 Tablet by mouth every day. 30 Tablet 0   • levothyroxine (SYNTHROID) 137 MCG Tab Take 1 Tablet by mouth every morning on an empty stomach. 30 Tablet 0   • pravastatin (PRAVACHOL) 20 MG Tab Take 1 Tablet by mouth every day. 30 Tablet 0   • BMH ESOMEPRAZOLE MAGNESIUM 40 MG PO CPDR Take 40 mg by mouth every day.     • Naloxegol Oxalate (MOVANTIK) 25 MG Tab Take 25 mg by mouth every day.     • therapeutic multivitamin-minerals (THERAGRAN-M) Tab Take 1 tablet by mouth every day.       No current facility-administered medications for this visit.       She  has a past medical history of Anemia, Anesthesia, Breath shortness, Bronchitis ( ), Cardiomegaly, Chronic fatigue and malaise, Cirrhosis ([facility]) (December 2011), CKD (chronic kidney disease) stage 3, GFR 30-59 ml/min ([facility]), Diabetes ([facility]), Fracture of left foot, GERD (gastroesophageal reflux disease), H/O Clostridium difficile infection (02-17-17), Hemorrhagic disorder ([facility]), Hiatus hernia syndrome, High cholesterol, Hypothyroid, Jaundice (2009), Liver transplanted ([facility]), Low back pain (02-17-17), Mild aortic regurgitation and aortic valve sclerosis ( ), On home oxygen therapy, Platelet disorder ([facility]), Pneumonia, Psychiatric disorder, Pulmonary hypertension ([facility]), S/P cholecystectomy, Small  "bowel obstruction (HCC), Splenomegaly, and VRE (vancomycin-resistant Enterococci).    Rhubarb, Organic nitrates, Other drug, Vancomycin hcl, and Nsaids    She  reports that she has never smoked. She has never used smokeless tobacco. She reports previous alcohol use. She reports that she does not use drugs.  Counseling given: Not Answered  Comment: avoid all tobacco products      ROS   No chest pain, no shortness of breath, no abdominal pain.     Objective:     Physical Exam:  /70 (BP Location: Right arm, Patient Position: Sitting, BP Cuff Size: Adult)   Pulse (!) 106   Temp 36.6 °C (97.8 °F) (Temporal)   Resp 16   Ht 1.753 m (5' 9\")   Wt 66.7 kg (147 lb)   SpO2 97%  Body mass index is 21.71 kg/m².   Constitutional: Alert, moderate acute distress.  Patient is currently retching but no active vomiting.  Skin: Warm, dry, good turgor, no rashes in visible areas.  Eye: Equal, round and reactive, conjunctiva clear, lids normal.  Neck: Trachea midline, no masses, no thyromegaly. No cervical or supraclavicular lymphadenopathy.  Respiratory: Unlabored respiratory effort, lungs clear to auscultation, no wheezes, no rhonchi.  Cardiovascular: Normal S1, S2, no murmur, no edema.  Abdomen: Soft, diffusely tender without rebound or guarding, no masses,  Psych: Alert and oriented x3, depressed affect and mood.    Assessment and Plan:     1. Hospital discharge follow-up  Patient still having significant nausea and retching.  She reports she is feeling awful today.  Zofran has worked well in the past and she was hoping for a refill of this.  We discussed working on increasing her fluid intake.    2. Nausea  Zofran has worked in the past.  This is a new prescription for me.  Prescription for Zofran sent in.  - ondansetron (ZOFRAN ODT) 4 MG TABLET DISPERSIBLE; Take 1 Tablet by mouth every 8 hours as needed for Nausea.  Dispense: 30 Tablet; Refill: 2    3. Hepatopulmonary syndrome (HCC)  Patient is followed by [facility] for this. "  She is status post liver transplant.  She does have sarcoidosis and chronic respiratory failure.    4. DUC (generalized anxiety disorder)  Discussed that we cannot use benzodiazepines with her opiate use.  She was on trazodone for her sleep and anxiety in [place] and she feels like this helped.  She would like a prescription for this.  Behavioral modifications discussed    5. Seizures (HCC)  Continue Keppra and Vimpat as directed.  Follow-up with neurosurgeon as scheduled    6. Pancytopenia (HCC)  Continue follow-up with oncology    7. Adrenal insufficiency (HCC)  Continue follow-up with endocrinology.  Continue Florinef 0.2 mg daily    8. Chronic respiratory failure with hypoxia (HCC)  Continue oxygen therapy.  Follow-up with pulmonology as scheduled    9. Primary insomnia  This is a new problem to me.  Patient use this while in [place] and would like to continue.  New prescription for trazodone 50 mg given.  - traZODone (DESYREL) 50 MG Tab; Take 1 Tablet by mouth at bedtime.  Dispense: 901 Tablet; Refill: 3      Discussion today about general wellness and lifestyle habits:    · Engage in regular physical activity and social activities.  · Prevent falls and reduce trip hazards; using ambulatory aides, hearing and vision testing if appropriate.  · Steps to improve urinary incontinence.  · Advanced care planning.    Follow-Up: Return in about 4 weeks (around 10/28/2021).         PLEASE NOTE: This dictation was created using voice recognition software. I have made every reasonable attempt to correct obvious errors, but I expect that there are errors of grammar and possibly content that I did not discover before finalizing the note.

## 2021-10-04 NOTE — TELEPHONE ENCOUNTER
Received request via: Pharmacy    Was the patient seen in the last year in this department? Yes  9/30/21  Does the patient have an active prescription (recently filled or refills available) for medication(s) requested? No

## 2021-10-05 ENCOUNTER — HOME CARE VISIT (OUTPATIENT)
Dept: HOME HEALTH SERVICES | Facility: HOME HEALTHCARE | Age: 61
End: 2021-10-05
Payer: MEDICARE

## 2021-10-05 VITALS
HEART RATE: 78 BPM | TEMPERATURE: 97.6 F | OXYGEN SATURATION: 99 % | RESPIRATION RATE: 18 BRPM | DIASTOLIC BLOOD PRESSURE: 78 MMHG | SYSTOLIC BLOOD PRESSURE: 124 MMHG

## 2021-10-05 PROCEDURE — G0299 HHS/HOSPICE OF RN EA 15 MIN: HCPCS

## 2021-10-05 RX ORDER — TACROLIMUS 1 MG/1
4 CAPSULE ORAL DAILY
Qty: 360 CAPSULE | Refills: 1 | Status: SHIPPED | OUTPATIENT
Start: 2021-10-05 | End: 2021-01-01

## 2021-10-05 ASSESSMENT — ENCOUNTER SYMPTOMS
SUBJECTIVE PAIN PROGRESSION: GRADUALLY IMPROVING
PAIN LOCATION - PAIN DURATION: FEW MINUTES
PAIN LOCATION - EXACERBATING FACTORS: EXERTION
MUSCLE WEAKNESS: 1
PERSON REPORTING PAIN: PATIENT
PAIN LOCATION - PAIN SEVERITY: 5/10
PAIN LOCATION: RIGHT ARM
PAIN LOCATION - RELIEVING FACTORS: PAIN MEDICATION
HIGHEST PAIN SEVERITY IN PAST 24 HOURS: 5/10
ASSOCIATED SYMPTOMS: DENIES
PAIN: 1
PAIN SEVERITY GOAL: 1/10
PAIN LOCATION - PAIN QUALITY: SHARP, DULL
LOWEST PAIN SEVERITY IN PAST 24 HOURS: 4/10
PAIN LOCATION - PAIN FREQUENCY: INTERMITTENT

## 2021-10-05 NOTE — ASSESSMENT & PLAN NOTE
Patient reports that her anxiety is significantly worsened with her recent worsening in her health.  She was given Xanax in the hospital but she is also on narcotics for pain.  We discussed that she cannot do the 2 together because of her chronic respiratory failure.

## 2021-10-05 NOTE — ASSESSMENT & PLAN NOTE
Patient is on antiseizure medicine following her traumatic subdural hematoma.  This is been well controlled with Vimpat 100 mg twice a day and Keppra 750 mg twice a day.  She denies any recent seizures or loss of consciousness.

## 2021-10-05 NOTE — ASSESSMENT & PLAN NOTE
This is a chronic medical problem with a recent exacerbation.  She is currently on Florinef 0.2 milligrams daily.  She continues to feel very weak.

## 2021-10-05 NOTE — ASSESSMENT & PLAN NOTE
Patient has a history of sarcoidosis, pulmonary hypertension and hepatopulmonary syndrome.  Continue home oxygen.

## 2021-10-05 NOTE — ASSESSMENT & PLAN NOTE
Patient has a history of pancytopenia bethea and is followed by hematologist.  She did have notable decrease in her placed lids over the last month but denies any active bleeding.  She was recently treated for Campylobacter gastroenteritis in the hospital.

## 2021-10-11 DIAGNOSIS — S06.5X0A TRAUMATIC SUBDURAL HEMATOMA WITHOUT LOSS OF CONSCIOUSNESS, INITIAL ENCOUNTER (HCC): ICD-10-CM

## 2021-10-11 DIAGNOSIS — R56.9 SEIZURES (HCC): ICD-10-CM

## 2021-10-11 RX ORDER — LACOSAMIDE 100 MG/1
100 TABLET ORAL 2 TIMES DAILY
Qty: 60 TABLET | Refills: 0 | Status: SHIPPED | OUTPATIENT
Start: 2021-10-11 | End: 2021-01-01 | Stop reason: SDUPTHER

## 2021-10-12 ENCOUNTER — OFFICE VISIT (OUTPATIENT)
Dept: CARDIOLOGY | Facility: MEDICAL CENTER | Age: 61
End: 2021-10-12
Payer: MEDICARE

## 2021-10-12 ENCOUNTER — APPOINTMENT (OUTPATIENT)
Dept: SLEEP MEDICINE | Facility: MEDICAL CENTER | Age: 61
End: 2021-10-12
Payer: MEDICARE

## 2021-10-12 VITALS
DIASTOLIC BLOOD PRESSURE: 76 MMHG | RESPIRATION RATE: 16 BRPM | BODY MASS INDEX: 21.71 KG/M2 | HEIGHT: 69 IN | WEIGHT: 146.6 LBS | SYSTOLIC BLOOD PRESSURE: 122 MMHG | HEART RATE: 73 BPM | OXYGEN SATURATION: 100 %

## 2021-10-12 DIAGNOSIS — J84.9 INTERSTITIAL LUNG DISEASE (HCC): ICD-10-CM

## 2021-10-12 DIAGNOSIS — I27.0 PRIMARY PULMONARY HYPERTENSION (HCC): ICD-10-CM

## 2021-10-12 DIAGNOSIS — Z98.84 S/P BARIATRIC SURGERY: ICD-10-CM

## 2021-10-12 DIAGNOSIS — F42.2 MIXED OBSESSIONAL THOUGHTS AND ACTS: ICD-10-CM

## 2021-10-12 DIAGNOSIS — J96.11 CHRONIC RESPIRATORY FAILURE WITH HYPOXIA (HCC): ICD-10-CM

## 2021-10-12 DIAGNOSIS — T38.0X5A STEROID-INDUCED HYPERGLYCEMIA: ICD-10-CM

## 2021-10-12 DIAGNOSIS — D47.2 MGUS (MONOCLONAL GAMMOPATHY OF UNKNOWN SIGNIFICANCE): ICD-10-CM

## 2021-10-12 DIAGNOSIS — I34.0 MILD MITRAL REGURGITATION: ICD-10-CM

## 2021-10-12 DIAGNOSIS — K76.81 HEPATOPULMONARY SYNDROME (HCC): ICD-10-CM

## 2021-10-12 DIAGNOSIS — R56.9 SEIZURES (HCC): ICD-10-CM

## 2021-10-12 DIAGNOSIS — E87.0 HYPERNATREMIA: ICD-10-CM

## 2021-10-12 DIAGNOSIS — G89.4 CHRONIC PAIN SYNDROME: ICD-10-CM

## 2021-10-12 DIAGNOSIS — Z94.4 STATUS POST LIVER TRANSPLANT (HCC): ICD-10-CM

## 2021-10-12 DIAGNOSIS — E27.40 ADRENAL INSUFFICIENCY (HCC): ICD-10-CM

## 2021-10-12 DIAGNOSIS — E78.00 PURE HYPERCHOLESTEROLEMIA: ICD-10-CM

## 2021-10-12 DIAGNOSIS — F33.41 RECURRENT MAJOR DEPRESSIVE DISORDER, IN PARTIAL REMISSION (HCC): ICD-10-CM

## 2021-10-12 DIAGNOSIS — Z79.899 HIGH RISK MEDICATION USE: ICD-10-CM

## 2021-10-12 DIAGNOSIS — I25.2 H/O NON-ST ELEVATION MYOCARDIAL INFARCTION (NSTEMI): ICD-10-CM

## 2021-10-12 DIAGNOSIS — R73.9 STEROID-INDUCED HYPERGLYCEMIA: ICD-10-CM

## 2021-10-12 PROBLEM — S06.5XAA SDH (SUBDURAL HEMATOMA) (HCC): Status: ACTIVE | Noted: 2021-08-03

## 2021-10-12 PROCEDURE — 99215 OFFICE O/P EST HI 40 MIN: CPT | Performed by: INTERNAL MEDICINE

## 2021-10-12 RX ORDER — PRAVASTATIN SODIUM 20 MG
20 TABLET ORAL DAILY
Qty: 90 TABLET | Refills: 3 | Status: SHIPPED | OUTPATIENT
Start: 2021-10-12 | End: 2022-01-01 | Stop reason: SDUPTHER

## 2021-10-12 RX ORDER — FUROSEMIDE 20 MG/1
20 TABLET ORAL DAILY
Qty: 60 TABLET | Refills: 11 | Status: SHIPPED | OUTPATIENT
Start: 2021-10-12 | End: 2021-01-01

## 2021-10-12 RX ORDER — TADALAFIL 20 MG/1
20 TABLET ORAL DAILY
Qty: 30 TABLET | Refills: 11 | Status: SHIPPED | OUTPATIENT
Start: 2021-10-12 | End: 2021-01-01 | Stop reason: SDUPTHER

## 2021-10-12 ASSESSMENT — FIBROSIS 4 INDEX: FIB4 SCORE: 6.94

## 2021-10-12 ASSESSMENT — ENCOUNTER SYMPTOMS
NEUROLOGICAL NEGATIVE: 1
SORE THROAT: 0
DIZZINESS: 0
WHEEZING: 0
EYES NEGATIVE: 1
SHORTNESS OF BREATH: 0
SPUTUM PRODUCTION: 0
CONSTITUTIONAL NEGATIVE: 1
PND: 0
CARDIOVASCULAR NEGATIVE: 1
GASTROINTESTINAL NEGATIVE: 1
ORTHOPNEA: 0
MUSCULOSKELETAL NEGATIVE: 1
PALPITATIONS: 0
CLAUDICATION: 0
FEVER: 0
BRUISES/BLEEDS EASILY: 0
HEMOPTYSIS: 0
STRIDOR: 0
RESPIRATORY NEGATIVE: 1
LOSS OF CONSCIOUSNESS: 0
COUGH: 0
CHILLS: 0
WEAKNESS: 0

## 2021-10-12 NOTE — PROGRESS NOTES
"Chief Complaint   Patient presents with   • Mitral Valve Prolapse     Dx: Mild mitral regurgitation   • Aortic Stenosis     Dx: Mild aortic regurgitation and aortic valve sclerosis   • Hyperlipidemia     Dx: Pure hypercholesterolemia       Subjective     Roxana Cuba is a 61 y.o. female who presents today A couple months ago during hospitalization was normal.as a follow-up.  She was lost to follow-up for couple of years because she is got stuck in [place] due to the pandemic.  She is almost out of her Latera's.  She did fall hit her head and had a surgery for subdural hematoma.  Her oxygen saturation is normal.  Her echocardiogram that was done A few months ago was normal.    Past Medical History:   Diagnosis Date   • Anemia    • Anesthesia     \"takes more to get me under, would rather be intubated\"    • Breath shortness     cont oxygen 5L (Preffered)   • Bronchitis      2016   • Cardiomegaly    • Chronic fatigue and malaise    • Cirrhosis (HCC) December 2011    Status post liver transplant at [facility]   • CKD (chronic kidney disease) stage 3, GFR 30-59 ml/min ([facility])    • Diabetes ([facility])     reports hx of, resolved w/weight loss. Reports still checking bloodsugars twice daily and using insulin PRN   • Fracture of left foot    • GERD (gastroesophageal reflux disease)         • H/O Clostridium difficile infection 02-17-17    reports \"16 months ago\". Current stool sample 01-26-17 neg   • Hemorrhagic disorder ([facility])     \"low platelets\" bruises easily    • Hiatus hernia syndrome    • High cholesterol    • Hypothyroid    • Jaundice 2009   • Liver transplanted ([facility])    • Low back pain 02-17-17    and left foot, 7/10   • Mild aortic regurgitation and aortic valve sclerosis     • On home oxygen therapy     5 liters, Dr. Gaming   • Platelet disorder ([facility])     low platelets   • Pneumonia     aspiration,    • Psychiatric disorder     Mood disorder   • Pulmonary hypertension ([facility])        • S/P cholecystectomy    • Small " bowel obstruction (HCC)     01-   • Splenomegaly    • VRE (vancomycin-resistant Enterococci)     02-17-17, pt declines knowledge of this     Past Surgical History:   Procedure Laterality Date   • CRANIOTOMY Left 8/4/2021    Procedure: CRANIOTOMY;  Surgeon: Tramaine Arnold III, M.D.;  Location: SURGERY [facility];  Service: Neurosurgery   • VENTRAL HERNIA REPAIR ROBOTIC XI N/A 11/12/2018    Procedure: VENTRAL HERNIA REPAIR ROBOTIC XI- FOR EPIGASTRIC INCISIONAL;  Surgeon: John H Ganser, M.D.;  Location: SURGERY [facility];  Service: General   • TURBINATE REDUCTION Bilateral 10/4/2018    Procedure: TURBINATE REDUCTION;  Surgeon: Silviano Erazo M.D.;  Location: SURGERY SAME DAY [facility];  Service: Ent   • NASAL RECONSTRUCTION Bilateral 10/4/2018    Procedure: NASAL RECONSTRUCTION- LATERA IMPLANTS;  Surgeon: Silviano Erazo M.D.;  Location: SURGERY SAME DAY [facility];  Service: Ent   • OTHER  12/11/2017    paniculectomy   • COLONOSCOPY N/A 3/27/2017    Procedure: COLONOSCOPY;  Surgeon: Osman Matt M.D.;  Location: SURGERY SAME DAY [facility]S;  Service:    • GASTROSCOPY N/A 3/27/2017    Procedure: GASTROSCOPY;  Surgeon: Osman Matt M.D.;  Location: SURGERY SAME DAY [facility];  Service:    • BREAST BIOPSY Left 2/21/2017    Procedure: BREAST BIOPSY - WIRE LOCALIZED EXCISONAL ;  Surgeon: Jane Zhao M.D.;  Location: SURGERY SAME DAY [facility];  Service:    • LUNG BIOPSY OPEN  11/2016   • OTHER ABDOMINAL SURGERY      Gasric Sleeve   • BONE MARROW ASPIRATION  3/16/2015    Performed by Freddie Hi M.D. at ENDOSCOPY [facility]   • BONE MARROW BIOPSY, NDL/TROCAR  3/16/2015    Performed by Freddie Hi M.D. at ENDOSCOPY [facility]   • RECOVERY  3/31/2014    Performed by Ir-Recovery Surgery at SURGERY [facility]   • RECOVERY  3/24/2014    Performed by Cath-Recovery Surgery at SURGERY SAME DAY [facility]   • RECOVERY  1/21/2014    Performed by Ir-Recovery Surgery  at SURGERY SAME DAY [facility]   • BRONCHOSCOPY-ENDO  11/15/2013    Performed by Ha Perez M.D. at ENDOSCOPY [facility]   • RECOVERY  2/27/2013    Performed by -Recovery Surgery at SURGERY SAME DAY [facility]   • BONE MARROW ASPIRATION  12/31/2012    Performed by Josemanuel Real M.D. at ENDOSCOPY [facility]   • BONE MARROW BIOPSY, NDL/TROCAR  12/31/2012    Performed by Josemanuel Real M.D. at ENDOSCOPY [facility]   • GASTROSCOPY  9/28/2012    Performed by Aravind Richards M.D. at SURGERY [facility]   • SIGMOIDOSCOPY FLEX  9/26/2012    Performed by Kristopher Cardoso M.D. at ENDOSCOPY [facility]   • BRONCHOSCOPY-ENDO  6/19/2012    Performed by MARLENA MURILLO at ENDOSCOPY [facility]   • BRONCHOSCOPY-ENDO  5/29/2012    Performed by SUYAPA CAMARENA at ENDOSCOPY [facility]   • OTHER ABDOMINAL SURGERY  December 2011    Liver transplant at [facility] by Dr. Canada.   • GASTROSCOPY-ENDO  3/12/2010    Performed by CAMELIA SAMANO at ENDOSCOPY [facility]   • EXAM UNDER ANESTHESIA  11/11/2009    Performed by BIRD ABDI at SURGERY [facility]   • HEMORRHOIDECTOMY  11/11/2009    Performed by BIRD ABDI at SURGERY [facility]   • KELBY BY LAPAROSCOPY  9/19/2009    Performed by BIRD ABDI at SURGERY [facility]   • CARPAL TUNNEL RELEASE          • KELBY BY LAPAROSCOPY     • GASTRIC BYPASS LAPAROSCOPIC     • HERNIA REPAIR      x3   • HYSTERECTOMY, TOTAL ABDOMINAL          • OTHER      Breast reduction   • OTHER      liver transplant   • PB ANESTH,NOSE,SINUS SURGERY      x4   • TONSILLECTOMY       Family History   Problem Relation Age of Onset   • Other Father         Unknown (dead 10 years)   • Diabetes Father    • Heart Disease Father    • Hypertension Father    • Hyperlipidemia Father    • Stroke Father    • Non-contributory Mother    • Hyperlipidemia Mother    • Alcohol/Drug Mother    • Cancer Paternal Aunt    • Alcohol/Drug Maternal Grandmother     • Alcohol/Drug Maternal Grandfather      Social History     Socioeconomic History   • Marital status:      Spouse name: Not on file   • Number of children: Not on file   • Years of education: Not on file   • Highest education level: Not on file   Occupational History   • Not on file   Tobacco Use   • Smoking status: Never Smoker   • Smokeless tobacco: Never Used   • Tobacco comment: avoid all tobacco products   Vaping Use   • Vaping Use: Never used   Substance and Sexual Activity   • Alcohol use: Not Currently     Alcohol/week: 0.0 oz   • Drug use: No   • Sexual activity: Not on file   Other Topics Concern   • Primary/coprimary nurse & associates Not Asked   • Family contact information Not Asked   • OK to release patient information to the following Not Asked   • Patient preferred routine/Privacy concerns Not Asked   • Patient likes and dislikes Not Asked   • Participating In Research Study Not Asked   • Miscellaneous Not Asked   Social History Narrative   • Not on file     Social Determinants of Health     Financial Resource Strain:    • Difficulty of Paying Living Expenses:    Food Insecurity:    • Worried About Running Out of Food in the Last Year:    • Ran Out of Food in the Last Year:    Transportation Needs:    • Lack of Transportation (Medical):    • Lack of Transportation (Non-Medical):    Physical Activity:    • Days of Exercise per Week:    • Minutes of Exercise per Session:    Stress:    • Feeling of Stress :    Social Connections:    • Frequency of Communication with Friends and Family:    • Frequency of Social Gatherings with Friends and Family:    • Attends Restoration Services:    • Active Member of Clubs or Organizations:    • Attends Club or Organization Meetings:    • Marital Status:    Intimate Partner Violence:    • Fear of Current or Ex-Partner:    • Emotionally Abused:    • Physically Abused:    • Sexually Abused:      Allergies   Allergen Reactions   • Rhubarb Anaphylaxis   • Organic  Nitrates Unspecified     Nitroglycerin products should be avoided with the use of PDE-5 inhibitors as the combination can result in severe hypotension.  RD clarified with pt: Nitrates in food are okay and pt does not want nitrates to be listed as food allergy. Pt only avoids medications with nitrates.    • Other Drug      Any medication that may interact with cyclosporine, tacrolimus, sirolimus, or prograf (due to hx of liver transplant)    • Vancomycin Hcl Unspecified     Causes red man syndrome when infused to fast, ok if slowed infusion   • Nsaids Unspecified     Can not take due to hx of liver transplant      Outpatient Encounter Medications as of 10/12/2021   Medication Sig Dispense Refill   • tadalafil (CIALIS) 20 MG tablet Take 1 Tablet by mouth every day. 30 Tablet 11   • pravastatin (PRAVACHOL) 20 MG Tab Take 1 Tablet by mouth every day. 90 Tablet 3   • furosemide (LASIX) 20 MG Tab Take 1 Tablet by mouth every day. Indications: Edema 60 Tablet 11   • lacosamide (VIMPAT) 100 MG Tab tablet Take 1 Tablet by mouth 2 times a day for 30 days. 60 Tablet 0   • sertraline (ZOLOFT) 100 MG Tab TAKE 1 TABLET BY MOUTH EVERY DAY 90 Tablet 1   • tacrolimus (PROGRAF) 1 MG Cap TAKE 4 CAPSULES BY MOUTH EVERY DAY FOR 30 DAYS. 360 Capsule 1   • ondansetron (ZOFRAN ODT) 4 MG TABLET DISPERSIBLE Take 1 Tablet by mouth every 8 hours as needed for Nausea. 30 Tablet 2   • traZODone (DESYREL) 50 MG Tab Take 1 Tablet by mouth at bedtime. 901 Tablet 3   • Home Care Oxygen Inhale 3 L/min continuous. Oxygen dose range: 3 L/min  Respiratory route via: Nasal Cannula   Oxygen supplier: Preferred         • gabapentin (NEURONTIN) 100 MG Cap Take 100 mg by mouth.     • lacosamide (VIMPAT) 100 MG Tab tablet Take 100 mg by mouth.     • levetiracetam (KEPPRA) 750 MG tablet Take 2 Tablets by Enteral Tube route 2 times a day for 30 days. 120 Tablet 0   • fludrocortisone (FLORINEF) 0.1 MG Tab Take 0.2 mg by mouth every day. Indications: addisons  disease     • mycophenolate (CELLCEPT) 250 MG Cap Take 250 mg by mouth 2 times a day. Indications: Liver Transplant Recipient     • docusate sodium (COLACE) 100 MG Cap Take 100 mg by mouth 1 time a day as needed for Constipation.     • oxyCODONE immediate release (ROXICODONE) 10 MG immediate release tablet Take 10 mg by mouth every 6 hours as needed for Moderate Pain.     • acetaminophen (TYLENOL) 500 MG Tab Take 500 mg by mouth 1 time a day as needed for Fever.     • ambrisentan (LETAIRIS) 10 MG tablet Take 1 Tablet by mouth every day. 30 Tablet 0   • levothyroxine (SYNTHROID) 137 MCG Tab Take 1 Tablet by mouth every morning on an empty stomach. 30 Tablet 0   • BMH ESOMEPRAZOLE MAGNESIUM 40 MG PO CPDR Take 40 mg by mouth every day.     • Naloxegol Oxalate (MOVANTIK) 25 MG Tab Take 25 mg by mouth every day.     • therapeutic multivitamin-minerals (THERAGRAN-M) Tab Take 1 tablet by mouth every day.     • [DISCONTINUED] furosemide (LASIX) 20 MG Tab Take 20 mg by mouth every day. Indications: Edema     • [DISCONTINUED] gabapentin (NEURONTIN) 100 MG Cap Take 100 mg by mouth 2 times a day. Indications: Neuropathic Pain (Patient not taking: Reported on 10/12/2021)     • [DISCONTINUED] tadalafil (CIALIS) 20 MG tablet Take 20 mg by mouth every day.     • [DISCONTINUED] pravastatin (PRAVACHOL) 20 MG Tab Take 1 Tablet by mouth every day. 30 Tablet 0     No facility-administered encounter medications on file as of 10/12/2021.     Review of Systems   Constitutional: Negative.  Negative for chills, fever and malaise/fatigue.   HENT: Negative.  Negative for sore throat.    Eyes: Negative.    Respiratory: Negative.  Negative for cough, hemoptysis, sputum production, shortness of breath, wheezing and stridor.    Cardiovascular: Negative.  Negative for chest pain, palpitations, orthopnea, claudication, leg swelling and PND.   Gastrointestinal: Negative.    Genitourinary: Negative.    Musculoskeletal: Negative.    Skin: Negative.   "  Neurological: Negative.  Negative for dizziness, loss of consciousness and weakness.   Endo/Heme/Allergies: Negative.  Does not bruise/bleed easily.   All other systems reviewed and are negative.             Objective     /76 (BP Location: Left arm, Patient Position: Sitting, BP Cuff Size: Adult)   Pulse 73   Resp 16   Ht 1.753 m (5' 9\")   Wt 66.5 kg (146 lb 9.6 oz)   LMP 01/03/2000   SpO2 100%   BMI 21.65 kg/m²     Physical Exam  Vitals and nursing note reviewed.   Constitutional:       General: She is not in acute distress.     Appearance: She is well-developed. She is not diaphoretic.   HENT:      Head: Normocephalic and atraumatic.      Right Ear: External ear normal.      Left Ear: External ear normal.      Nose: Nose normal.      Mouth/Throat:      Pharynx: No oropharyngeal exudate.   Eyes:      General: No scleral icterus.        Right eye: No discharge.         Left eye: No discharge.      Conjunctiva/sclera: Conjunctivae normal.      Pupils: Pupils are equal, round, and reactive to light.   Neck:      Vascular: No JVD.   Cardiovascular:      Rate and Rhythm: Normal rate and regular rhythm.      Heart sounds: No murmur heard.   No friction rub. No gallop.    Pulmonary:      Effort: Pulmonary effort is normal. No respiratory distress.      Breath sounds: No stridor. No wheezing or rales.   Chest:      Chest wall: No tenderness.   Abdominal:      General: There is no distension.      Palpations: Abdomen is soft.      Tenderness: There is no guarding.   Musculoskeletal:         General: No tenderness or deformity. Normal range of motion.      Cervical back: Neck supple.   Skin:     General: Skin is warm and dry.      Coloration: Skin is not pale.      Findings: No erythema or rash.   Neurological:      Mental Status: She is alert.      Cranial Nerves: No cranial nerve deficit.      Motor: No abnormal muscle tone.      Coordination: Coordination normal.      Deep Tendon Reflexes: Reflexes are " normal and symmetric. Reflexes normal.   Psychiatric:         Behavior: Behavior normal.         Thought Content: Thought content normal.         Judgment: Judgment normal.                Assessment & Plan     1. Adrenal insufficiency (HCC)  REFERRAL TO PHYSICAL THERAPY   2. Chronic pain syndrome  REFERRAL TO PHYSICAL THERAPY   3. Chronic respiratory failure with hypoxia (HCC)  REFERRAL TO PHYSICAL THERAPY   4. H/O non-ST elevation myocardial infarction (NSTEMI)  REFERRAL TO PHYSICAL THERAPY   5. Hepatopulmonary syndrome (HCC)  pravastatin (PRAVACHOL) 20 MG Tab   6. High risk medication use  pravastatin (PRAVACHOL) 20 MG Tab   7. Hypernatremia  pravastatin (PRAVACHOL) 20 MG Tab    furosemide (LASIX) 20 MG Tab   8. Interstitial lung disease (HCC)  tadalafil (CIALIS) 20 MG tablet    pravastatin (PRAVACHOL) 20 MG Tab    furosemide (LASIX) 20 MG Tab    REFERRAL TO PHYSICAL THERAPY   9. MGUS (monoclonal gammopathy of unknown significance)  furosemide (LASIX) 20 MG Tab   10. Mild mitral regurgitation     11. Mixed obsessional thoughts and acts     12. Primary pulmonary hypertension (HCC)  tadalafil (CIALIS) 20 MG tablet   13. Pure hypercholesterolemia  tadalafil (CIALIS) 20 MG tablet   14. Recurrent major depressive disorder, in partial remission (HCC)     15. S/P bariatric surgery     16. Seizures (HCC)     17. Status post liver transplant Dr. Canada ([facility])  tadalafil (CIALIS) 20 MG tablet   18. Steroid-induced hyperglycemia  tadalafil (CIALIS) 20 MG tablet       Medical Decision Making: Today's Assessment/Status/Plan:      61-year-old female with hepatopulmonary pulmonary hypertension status post orthotopic liver transplant now with normal PA pressures on sildenafil and Latera's.  Given that she is recovering from her subdural hematoma we will allow her to continue to do that.  It is reassuring that her most recommend an echocardiogram was normal.  I refilled her medications.  We need to find out where she was  getting her Latera's from and I will send a repeat prescription for that.  I do not need to repeat her echocardiogram but we will see her back in 3 months.    Greater than 40 minutes of time were spent reviewing her hospitalization subdural hematoma and records from [facility] regarding her most recent hospitalization.

## 2021-10-13 ENCOUNTER — HOME CARE VISIT (OUTPATIENT)
Dept: HOME HEALTH SERVICES | Facility: HOME HEALTHCARE | Age: 61
End: 2021-10-13
Payer: MEDICARE

## 2021-10-13 DIAGNOSIS — K76.81 HEPATOPULMONARY SYNDROME (HCC): ICD-10-CM

## 2021-10-13 DIAGNOSIS — K21.00 GASTROESOPHAGEAL REFLUX DISEASE WITH ESOPHAGITIS: ICD-10-CM

## 2021-10-13 RX ORDER — AMBRISENTAN 10 MG/1
10 TABLET, FILM COATED ORAL DAILY
Qty: 30 TABLET | Refills: 0 | Status: SHIPPED | OUTPATIENT
Start: 2021-10-13 | End: 2021-01-01 | Stop reason: SDUPTHER

## 2021-10-13 RX ORDER — OMEPRAZOLE 40 MG/1
40 CAPSULE, DELAYED RELEASE ORAL DAILY
Qty: 90 CAPSULE | Refills: 3 | Status: SHIPPED | OUTPATIENT
Start: 2021-10-13 | End: 2022-01-01 | Stop reason: SDUPTHER

## 2021-10-13 SDOH — ECONOMIC STABILITY: HOUSING INSECURITY: EVIDENCE OF SMOKING MATERIAL: 0

## 2021-10-13 ASSESSMENT — ENCOUNTER SYMPTOMS
PAIN SEVERITY GOAL: 1/10
ASSOCIATED SYMPTOMS: DENIES
LOWEST PAIN SEVERITY IN PAST 24 HOURS: 4/10
PAIN LOCATION: RIGHT ELBOW
SUBJECTIVE PAIN PROGRESSION: GRADUALLY IMPROVING
HIGHEST PAIN SEVERITY IN PAST 24 HOURS: 6/10
PERSON REPORTING PAIN: PATIENT
PAIN LOCATION - PAIN SEVERITY: 4/10
PAIN: 1
PAIN LOCATION - EXACERBATING FACTORS: ACTIVITY

## 2021-10-13 ASSESSMENT — PATIENT HEALTH QUESTIONNAIRE - PHQ9: CLINICAL INTERPRETATION OF PHQ2 SCORE: 0

## 2021-10-13 ASSESSMENT — ACTIVITIES OF DAILY LIVING (ADL)
OASIS_M1830: 05
HOME_HEALTH_OASIS: 01

## 2021-10-13 NOTE — CASE COMMUNICATION
discharge from agency effective 10/13/21, end of cert period, some goals are met, pt starting outpatient physical therapy per .

## 2021-10-13 NOTE — TELEPHONE ENCOUNTER
Received request via: Patient    Was the patient seen in the last year in this department? Yes  9/30/21  Does the patient have an active prescription (recently filled or refills available) for medication(s) requested? No

## 2021-10-14 ENCOUNTER — TELEPHONE (OUTPATIENT)
Dept: MEDICAL GROUP | Facility: LAB | Age: 61
End: 2021-10-14

## 2021-10-14 NOTE — TELEPHONE ENCOUNTER
ESTABLISHED PATIENT PRE-VISIT PLANNING     Patient was NOT contacted to complete PVP.     Note: Patient will not be contacted if there is no indication to call.     1.  Reviewed notes from the last few office visits within the medical group: Yes    2.  If any orders were placed at last visit or intended to be done for this visit (i.e. 6 mos follow-up), do we have Results/Consult Notes?         •  Labs - Labs were not ordered at last office visit.  Note: If patient appointment is for lab review and patient did not complete labs, check with provider if OK to reschedule patient until labs completed.       •  Imaging - Imaging was not ordered at last office visit.       •  Referrals - No referrals were ordered at last office visit.    3. Is this appointment scheduled as a Hospital Follow-Up? No    4.  Immunizations were updated in Epic using Reconcile Outside Information activity? Yes    5.  Patient is due for the following Health Maintenance Topics:   Health Maintenance Due   Topic Date Due   • Annual Wellness Visit  Never done   • IMM ZOSTER VACCINES (2 of 2) 01/29/2019   • MAMMOGRAM  11/19/2020   • COVID-19 Vaccine (3 - Moderna risk 3-dose series) 05/19/2021   • IMM INFLUENZA (1) 09/01/2021         6.  AHA (Pulse8) form printed for Provider? No, patient does not have any open alerts

## 2021-10-15 ENCOUNTER — HOME CARE VISIT (OUTPATIENT)
Dept: HOME HEALTH SERVICES | Facility: HOME HEALTHCARE | Age: 61
End: 2021-10-15
Payer: MEDICARE

## 2021-10-15 ENCOUNTER — DOCUMENTATION (OUTPATIENT)
Dept: HOME HEALTH SERVICES | Facility: HOME HEALTHCARE | Age: 61
End: 2021-10-15

## 2021-10-15 NOTE — CASE COMMUNICATION
Quality Review for 10.13.21 MD OASIS performed on by MERVAT Romo RN on 10.15, 2021:    Edits for Radha to complete:     Edits completed by MERVAT Romo RN:  1. Sent [facility] referral for COPD and CC to PCP  2. Changed  to no, per Epic no ED visits after the SHELBIE

## 2021-10-15 NOTE — CASE COMMUNICATION
FYI: Ms. Cuba has been referred to the [facility] program for continued care and follow up for COPD after discharge from [facility]. Thank you for allowing [facility] to serve your patients health care needs.

## 2021-10-16 NOTE — CASE COMMUNICATION
I agree with the changes made.  ----- Message -----  From: Vicki Romo R.N.  Sent: 10/15/2021  10:47 AM PDT  To: Radha Pollard R.N.      Quality Review for 10.13.21 DC OASIS performed on by MERVAT Romo RN on 10.15, 2021:    Edits for Radha to complete:     Edits completed by MERVAT Romo RN:  1. Sent [facility] referral for COPD and CC to PCP  2. Changed  to no, per Epic no ED visits after the SHELBIE

## 2021-10-21 ENCOUNTER — TELEPHONE (OUTPATIENT)
Dept: MEDICAL GROUP | Facility: LAB | Age: 61
End: 2021-10-21

## 2021-10-21 ENCOUNTER — APPOINTMENT (OUTPATIENT)
Dept: MEDICAL GROUP | Facility: LAB | Age: 61
End: 2021-10-21
Payer: MEDICARE

## 2021-10-21 NOTE — TELEPHONE ENCOUNTER
1. Caller Name: Otis Cuba                          Call Back Number: [phone] (home)         How would the patient prefer to be contacted with a response: [employer] message    2. What are the patient's symptoms (location & severity)? Medication reaction     3. Is this a new symptom Yes    4. When did it start? After starting Vimpat patient started experiencing Nausea, diarrhea, dizziness, shaky, that is the reason why  patient had to cancel her appointment. Verbally spoke to spouse, informed him to make a tele medicine visit when possible. is also unsure if the reaction interfered with her keppra. Please advise.     5. Action taken per Active Symptom Guide: If worsen over time before a response to report to UC/ ED , Request PCP recommendation about how to discontinue, prescribed another medication.     6. Patient agrees to recommended action per Active Symptom Escalation Protocol.

## 2021-10-25 ENCOUNTER — TELEPHONE (OUTPATIENT)
Dept: CARDIOLOGY | Facility: MEDICAL CENTER | Age: 61
End: 2021-10-25

## 2021-10-25 NOTE — TELEPHONE ENCOUNTER
Roxana Cuba [place]: [id number] help? Call us at [phone]  Status  Sent to [employer]  Drug  Ambrisentan 10MG tablets  Form  MedImpact ePA Form 2017 NCPDP

## 2021-10-27 ENCOUNTER — TELEPHONE (OUTPATIENT)
Dept: MEDICAL GROUP | Facility: LAB | Age: 61
End: 2021-10-27

## 2021-10-27 NOTE — TELEPHONE ENCOUNTER
ESTABLISHED PATIENT PRE-VISIT PLANNING     Patient was NOT contacted to complete PVP.     Note: Patient will not be contacted if there is no indication to call.     1.  Reviewed notes from the last few office visits within the medical group: Yes    2.  If any orders were placed at last visit or intended to be done for this visit (i.e. 6 mos follow-up), do we have Results/Consult Notes?         •  Labs - Labs were not ordered at last office visit.  Note: If patient appointment is for lab review and patient did not complete labs, check with provider if OK to reschedule patient until labs completed.       •  Imaging - Imaging was not ordered at last office visit.       •  Referrals - No referrals were ordered at last office visit.    3. Is this appointment scheduled as a Hospital Follow-Up? No    4.  Immunizations were updated in Epic using Reconcile Outside Information activity? Yes    5.  Patient is due for the following Health Maintenance Topics:   Health Maintenance Due   Topic Date Due   • Annual Wellness Visit  Never done   • IMM ZOSTER VACCINES (2 of 2) 01/29/2019   • MAMMOGRAM  11/19/2020   • COVID-19 Vaccine (3 - Moderna risk 3-dose series) 05/19/2021         6.  AHA (Pulse8) form printed for Provider? No, already completed

## 2021-10-28 ENCOUNTER — HOSPITAL ENCOUNTER (OUTPATIENT)
Dept: LAB | Facility: MEDICAL CENTER | Age: 61
End: 2021-10-28
Attending: INTERNAL MEDICINE
Payer: MEDICARE

## 2021-10-28 ENCOUNTER — OFFICE VISIT (OUTPATIENT)
Dept: NEUROLOGY | Facility: MEDICAL CENTER | Age: 61
End: 2021-10-28
Attending: PSYCHIATRY & NEUROLOGY
Payer: MEDICARE

## 2021-10-28 VITALS
WEIGHT: 149.69 LBS | HEIGHT: 69 IN | RESPIRATION RATE: 16 BRPM | SYSTOLIC BLOOD PRESSURE: 108 MMHG | BODY MASS INDEX: 22.17 KG/M2 | TEMPERATURE: 98.9 F | DIASTOLIC BLOOD PRESSURE: 66 MMHG | HEART RATE: 74 BPM | OXYGEN SATURATION: 92 %

## 2021-10-28 DIAGNOSIS — S06.5X0A TRAUMATIC SUBDURAL HEMATOMA WITHOUT LOSS OF CONSCIOUSNESS, INITIAL ENCOUNTER (HCC): ICD-10-CM

## 2021-10-28 DIAGNOSIS — S06.5XAA SDH (SUBDURAL HEMATOMA) (HCC): ICD-10-CM

## 2021-10-28 DIAGNOSIS — R56.9 SEIZURES (HCC): ICD-10-CM

## 2021-10-28 LAB
ALBUMIN SERPL BCP-MCNC: 3 G/DL (ref 3.2–4.9)
ALBUMIN/GLOB SERPL: 1.7 G/DL
ALP SERPL-CCNC: 60 U/L (ref 30–99)
ALT SERPL-CCNC: 28 U/L (ref 2–50)
ANION GAP SERPL CALC-SCNC: 5 MMOL/L (ref 7–16)
AST SERPL-CCNC: 33 U/L (ref 12–45)
BASOPHILS # BLD AUTO: 0.8 % (ref 0–1.8)
BASOPHILS # BLD: 0.02 K/UL (ref 0–0.12)
BILIRUB SERPL-MCNC: 0.3 MG/DL (ref 0.1–1.5)
BUN SERPL-MCNC: 9 MG/DL (ref 8–22)
CALCIUM SERPL-MCNC: 8.1 MG/DL (ref 8.5–10.5)
CHLORIDE SERPL-SCNC: 104 MMOL/L (ref 96–112)
CO2 SERPL-SCNC: 38 MMOL/L (ref 20–33)
CORTIS SERPL-MCNC: 18.1 UG/DL (ref 0–23)
CREAT SERPL-MCNC: 0.75 MG/DL (ref 0.5–1.4)
EOSINOPHIL # BLD AUTO: 0.11 K/UL (ref 0–0.51)
EOSINOPHIL NFR BLD: 4.6 % (ref 0–6.9)
ERYTHROCYTE [DISTWIDTH] IN BLOOD BY AUTOMATED COUNT: 46.9 FL (ref 35.9–50)
EST. AVERAGE GLUCOSE BLD GHB EST-MCNC: 77 MG/DL
GLOBULIN SER CALC-MCNC: 1.8 G/DL (ref 1.9–3.5)
GLUCOSE SERPL-MCNC: 90 MG/DL (ref 65–99)
HBA1C MFR BLD: 4.3 % (ref 4–5.6)
HCT VFR BLD AUTO: 34 % (ref 37–47)
HGB BLD-MCNC: 10.9 G/DL (ref 12–16)
IMM GRANULOCYTES # BLD AUTO: 0.01 K/UL (ref 0–0.11)
IMM GRANULOCYTES NFR BLD AUTO: 0.4 % (ref 0–0.9)
LYMPHOCYTES # BLD AUTO: 0.9 K/UL (ref 1–4.8)
LYMPHOCYTES NFR BLD: 38 % (ref 22–41)
MCH RBC QN AUTO: 29.5 PG (ref 27–33)
MCHC RBC AUTO-ENTMCNC: 32.1 G/DL (ref 33.6–35)
MCV RBC AUTO: 91.9 FL (ref 81.4–97.8)
MONOCYTES # BLD AUTO: 0.23 K/UL (ref 0–0.85)
MONOCYTES NFR BLD AUTO: 9.7 % (ref 0–13.4)
NEUTROPHILS # BLD AUTO: 1.1 K/UL (ref 2–7.15)
NEUTROPHILS NFR BLD: 46.5 % (ref 44–72)
NRBC # BLD AUTO: 0 K/UL
NRBC BLD-RTO: 0 /100 WBC
PLATELET # BLD AUTO: 101 K/UL (ref 164–446)
PMV BLD AUTO: 10.8 FL (ref 9–12.9)
POTASSIUM SERPL-SCNC: 2.7 MMOL/L (ref 3.6–5.5)
PROT SERPL-MCNC: 4.8 G/DL (ref 6–8.2)
PTH-INTACT SERPL-MCNC: 89.7 PG/ML (ref 14–72)
RBC # BLD AUTO: 3.7 M/UL (ref 4.2–5.4)
SODIUM SERPL-SCNC: 147 MMOL/L (ref 135–145)
WBC # BLD AUTO: 2.4 K/UL (ref 4.8–10.8)

## 2021-10-28 PROCEDURE — 82024 ASSAY OF ACTH: CPT

## 2021-10-28 PROCEDURE — 36415 COLL VENOUS BLD VENIPUNCTURE: CPT

## 2021-10-28 PROCEDURE — 99212 OFFICE O/P EST SF 10 MIN: CPT | Performed by: PSYCHIATRY & NEUROLOGY

## 2021-10-28 PROCEDURE — 83036 HEMOGLOBIN GLYCOSYLATED A1C: CPT

## 2021-10-28 PROCEDURE — 83970 ASSAY OF PARATHORMONE: CPT

## 2021-10-28 PROCEDURE — 82533 TOTAL CORTISOL: CPT

## 2021-10-28 PROCEDURE — 99215 OFFICE O/P EST HI 40 MIN: CPT | Performed by: PSYCHIATRY & NEUROLOGY

## 2021-10-28 PROCEDURE — 85025 COMPLETE CBC W/AUTO DIFF WBC: CPT

## 2021-10-28 PROCEDURE — 80053 COMPREHEN METABOLIC PANEL: CPT

## 2021-10-28 RX ORDER — LEVETIRACETAM 250 MG/1
750 TABLET ORAL 2 TIMES DAILY
COMMUNITY
End: 2021-10-28 | Stop reason: SDUPTHER

## 2021-10-28 RX ORDER — LEVETIRACETAM 750 MG/1
750 TABLET ORAL 2 TIMES DAILY
Qty: 180 TABLET | Refills: 2 | Status: SHIPPED | OUTPATIENT
Start: 2021-10-28 | End: 2022-01-01

## 2021-10-28 ASSESSMENT — FIBROSIS 4 INDEX: FIB4 SCORE: 6.94

## 2021-10-28 NOTE — PATIENT INSTRUCTIONS
No changes were made to your seizure drugs today. Stay on your current dose of vimpat and keppra.

## 2021-10-28 NOTE — PROGRESS NOTES
"Chief Complaint   Patient presents with   • New Patient     Traumatic subdural hemmorhage       History of present illness:  Roxana Cuba 61 y.o. female with history of traumatic subdural hematoma  complicated by 3 seizures. She presented on 8/3/21 with headache and visual changes and had a MRI that revealed 29 mm subacute/chronic L SDH and SAH adjacent to L frontal/temporal lobes with 6 mm L->R shift. She underwent craniotomy.   She had recurrent seizures and was started on phenytoin and after being seizure free this was switched to vimpat on discharge.   2 weeks ago she was sitting on the bed, stood up to pee, then felt dizzy and lost consciousness for a few minutes. She appeared disoriented and tired to her  and laid down to sleep after.     Past medical history:   Past Medical History:   Diagnosis Date   • Anemia    • Anesthesia     \"takes more to get me under, would rather be intubated\"    • Breath shortness     cont oxygen 5L (Preffered)   • Bronchitis      2016   • Cardiomegaly    • Chronic fatigue and malaise    • Cirrhosis (HCC) December 2011    Status post liver transplant at [facility] [place]   • CKD (chronic kidney disease) stage 3, GFR 30-59 ml/min ([facility])    • Diabetes ([facility])     reports hx of, resolved w/weight loss. Reports still checking bloodsugars twice daily and using insulin PRN   • Fracture of left foot    • GERD (gastroesophageal reflux disease)         • H/O Clostridium difficile infection 02-17-17    reports \"16 months ago\". Current stool sample 01-26-17 neg   • Hemorrhagic disorder (HCC)     \"low platelets\" bruises easily    • Hiatus hernia syndrome    • High cholesterol    • Hypothyroid    • Jaundice 2009   • Liver transplanted (HCC)    • Low back pain 02-17-17    and left foot, 7/10   • Mild aortic regurgitation and aortic valve sclerosis     • On home oxygen therapy     5 liters, Dr. Gaming   • Platelet disorder (HCC)     low platelets   • Pneumonia     aspiration,    • Psychiatric " disorder     Mood disorder   • Pulmonary hypertension (HCC)        • S/P cholecystectomy    • Small bowel obstruction (HCC)     01-   • Splenomegaly    • VRE (vancomycin-resistant Enterococci)     02-17-17, pt declines knowledge of this       Past surgical history:   Past Surgical History:   Procedure Laterality Date   • CRANIOTOMY Left 8/4/2021    Procedure: CRANIOTOMY;  Surgeon: Tramaine Arnold III, M.D.;  Location: SURGERY [facility];  Service: Neurosurgery   • VENTRAL HERNIA REPAIR ROBOTIC XI N/A 11/12/2018    Procedure: VENTRAL HERNIA REPAIR ROBOTIC XI- FOR EPIGASTRIC INCISIONAL;  Surgeon: John H Ganser, M.D.;  Location: SURGERY [facility] ORS;  Service: General   • TURBINATE REDUCTION Bilateral 10/4/2018    Procedure: TURBINATE REDUCTION;  Surgeon: Silviano Erazo M.D.;  Location: SURGERY SAME DAY [facility] ORS;  Service: Ent   • NASAL RECONSTRUCTION Bilateral 10/4/2018    Procedure: NASAL RECONSTRUCTION- LATERA IMPLANTS;  Surgeon: Silviano Erazo M.D.;  Location: SURGERY SAME DAY [facility] ORS;  Service: Ent   • OTHER  12/11/2017    paniculectomy   • COLONOSCOPY N/A 3/27/2017    Procedure: COLONOSCOPY;  Surgeon: Osman Matt M.D.;  Location: SURGERY SAME DAY [facility] ORS;  Service:    • GASTROSCOPY N/A 3/27/2017    Procedure: GASTROSCOPY;  Surgeon: Osman Matt M.D.;  Location: SURGERY SAME DAY [facility] ORS;  Service:    • BREAST BIOPSY Left 2/21/2017    Procedure: BREAST BIOPSY - WIRE LOCALIZED EXCISONAL ;  Surgeon: Jane Zhao M.D.;  Location: SURGERY SAME DAY [facility] ORS;  Service:    • LUNG BIOPSY OPEN  11/2016   • OTHER ABDOMINAL SURGERY      Gasric Sleeve   • BONE MARROW ASPIRATION  3/16/2015    Performed by Freddie Hi M.D. at ENDOSCOPY [facility]   • BONE MARROW BIOPSY, NDL/TROCAR  3/16/2015    Performed by Freddie Hi M.D. at ENDOSCOPY [facility]S   • RECOVERY  3/31/2014    Performed by [facility] at [facility]   • RECOVERY   3/24/2014    Performed by [facility]overy Surgery at SURGERY SAME DAY [facility]   • RECOVERY  1/21/2014    Performed by [facility] at SURGERY SAME DAY [facility]   • BRONCHOSCOPY-ENDO  11/15/2013    Performed by Ha Perez M.D. at ENDOSCOPY [facility]   • RECOVERY  2/27/2013    Performed by [facility] at SURGERY SAME DAY [facility]   • BONE MARROW ASPIRATION  12/31/2012    Performed by Josemanuel Real M.D. at ENDOSCOPY [facility]   • BONE MARROW BIOPSY, NDL/TROCAR  12/31/2012    Performed by Josemanuel Real M.D. at ENDOSCOPY [facility]   • GASTROSCOPY  9/28/2012    Performed by Aravind Richards M.D. at SURGERY [facility]   • SIGMOIDOSCOPY FLEX  9/26/2012    Performed by Kristopher Cardoso M.D. at ENDOSCOPY [facility]   • BRONCHOSCOPY-ENDO  6/19/2012    Performed by MARLENA MURILLO at ENDOSCOPY [facility]   • BRONCHOSCOPY-ENDO  5/29/2012    Performed by SUYAPA CAMARENA at ENDOSCOPY [facility]   • OTHER ABDOMINAL SURGERY  December 2011    Liver transplant [place] [facility] [place] [place]. [place].   • GASTROSCOPY-ENDO  3/12/2010    Performed by CAMELIA SAMANO at ENDOSCOPY [facility]   • EXAM UNDER ANESTHESIA  11/11/2009    Performed by BIRD ABDI at SURGERY [facility]   • HEMORRHOIDECTOMY  11/11/2009    Performed by BIRD ABDI at SURGERY [facility]   • KELBY BY LAPAROSCOPY  9/19/2009    Performed by BIRD ABDI at SURGERY [facility]   • CARPAL TUNNEL RELEASE          • KELBY BY LAPAROSCOPY     • GASTRIC BYPASS LAPAROSCOPIC     • HERNIA REPAIR      x3   • HYSTERECTOMY, TOTAL ABDOMINAL          • OTHER      Breast reduction   • OTHER      liver transplant   • PB ANESTH,NOSE,SINUS SURGERY      x4   • TONSILLECTOMY         Family history:   Family History   Problem Relation Age of Onset   • Other Father         Unknown (dead 10 years)   • Diabetes Father    • Heart Disease Father    • Hypertension Father    • Hyperlipidemia Father    •  Stroke Father    • Non-contributory Mother    • Hyperlipidemia Mother    • Alcohol/Drug Mother    • Cancer Paternal Aunt    • Alcohol/Drug Maternal Grandmother    • Alcohol/Drug Maternal Grandfather        Social history:   Social History     Socioeconomic History   • Marital status:      Spouse name: Not on file   • Number of children: Not on file   • Years of education: Not on file   • Highest education level: Not on file   Occupational History   • Not on file   Tobacco Use   • Smoking status: Never Smoker   • Smokeless tobacco: Never Used   • Tobacco comment: avoid all tobacco products   Vaping Use   • Vaping Use: Never used   Substance and Sexual Activity   • Alcohol use: Not Currently     Alcohol/week: 0.0 oz   • Drug use: No   • Sexual activity: Not on file   Other Topics Concern   • Primary/coprimary nurse & associates Not Asked   • Family contact information Not Asked   • OK to release patient information to the following Not Asked   • Patient preferred routine/Privacy concerns Not Asked   • Patient likes and dislikes Not Asked   • Participating In Research Study Not Asked   • Miscellaneous Not Asked   Social History Narrative   • Not on file     Social Determinants of Health     Financial Resource Strain:    • Difficulty of Paying Living Expenses:    Food Insecurity:    • Worried About Running Out of Food in the Last Year:    • Ran Out of Food in the Last Year:    Transportation Needs:    • Lack of Transportation (Medical):    • Lack of Transportation (Non-Medical):    Physical Activity:    • Days of Exercise per Week:    • Minutes of Exercise per Session:    Stress:    • Feeling of Stress :    Social Connections:    • Frequency of Communication with Friends and Family:    • Frequency of Social Gatherings with Friends and Family:    • Attends [employer] Services:    • Active Member of Clubs or Organizations:    • Attends Club or Organization Meetings:    • Marital Status:    Intimate Partner Violence:     • Fear of Current or Ex-Partner:    • Emotionally Abused:    • Physically Abused:    • Sexually Abused:        Current medications:   Current Outpatient Medications   Medication   • levETIRAcetam (KEPPRA) 750 MG tablet   • ambrisentan (LETAIRIS) 10 MG tablet   • omeprazole (PRILOSEC) 40 MG delayed-release capsule   • tadalafil (CIALIS) 20 MG tablet   • pravastatin (PRAVACHOL) 20 MG Tab   • furosemide (LASIX) 20 MG Tab   • lacosamide (VIMPAT) 100 MG Tab tablet   • sertraline (ZOLOFT) 100 MG Tab   • tacrolimus (PROGRAF) 1 MG Cap   • ondansetron (ZOFRAN ODT) 4 MG TABLET DISPERSIBLE   • traZODone (DESYREL) 50 MG Tab   • Home Care Oxygen   • gabapentin (NEURONTIN) 100 MG Cap   • fludrocortisone (FLORINEF) 0.1 MG Tab   • mycophenolate (CELLCEPT) 250 MG Cap   • docusate sodium (COLACE) 100 MG Cap   • oxyCODONE immediate release (ROXICODONE) 10 MG immediate release tablet   • acetaminophen (TYLENOL) 500 MG Tab   • levothyroxine (SYNTHROID) 137 MCG Tab   • Naloxegol Oxalate (MOVANTIK) 25 MG Tab   • therapeutic multivitamin-minerals (THERAGRAN-M) Tab   • lacosamide (VIMPAT) 100 MG Tab tablet     No current facility-administered medications for this visit.       Medication Allergy:  Allergies   Allergen Reactions   • Rhubarb Anaphylaxis   • Organic Nitrates Unspecified     Nitroglycerin products should be avoided with the use of PDE-5 inhibitors as the combination can result in severe hypotension.  RD clarified with pt: Nitrates in food are okay and pt does not want nitrates to be listed as food allergy. Pt only avoids medications with nitrates.    • Other Drug      Any medication that may interact with cyclosporine, tacrolimus, sirolimus, or prograf (due to hx of liver transplant)    • Vancomycin Hcl Unspecified     Causes red man syndrome when infused to fast, ok if slowed infusion   • Nsaids Unspecified     Can not take due to hx of liver transplant      Physical examination:   Vitals:    10/28/21 1425 10/28/21 1507  "10/28/21 1509   BP: 114/70 122/78 108/66   BP Location: Left arm     Patient Position: Sitting Sitting Standing   BP Cuff Size: Adult     Pulse: 74     Resp: 16     Temp: 37.2 °C (98.9 °F)     TempSrc: Temporal     SpO2: 92%     Weight: 67.9 kg (149 lb 11.1 oz)     Height: 1.753 m (5' 9\")       Neurological Exam    Cranial Nerves  CN II: Right normal visual field. Left normal visual field.  CN III, IV, VI: Extraocular movements intact bilaterally. No nystagmus. Normal smooth pursuit.  CN V:  Right: Facial sensation is normal.  Left: Facial sensation is normal on the left.  CN VII:  Right: There is no facial weakness.  Left: There is no facial weakness.  CN XII: Tongue midline without atrophy or fasciculations.    Motor                                               Right                     Left  Elbow flexion                         5                          5  Elbow extension                    5                          5  Hip flexion                              5                          5    Sensory  Light touch is normal in upper and lower extremities.     Coordination  Right: Finger-to-nose normal. Rapid alternating movement normal. Heel-to-shin normal.  Left: Finger-to-nose normal. Rapid alternating movement normal. Heel-to-shin normal.    Gait  Casual gait is normal including stance, stride, and arm swing.      Labs:  I reviewed the following labs personally:  None     Imagin/10/21 CT HEAD W/O   I reviewed the images personally and agree with the following read:     IMPRESSION:     1.  Interval decrease in postoperative left subdural collection with decreased density. Mass effect on the underlying sulci without significant midline shift.     2.  Diffuse atrophy and periventricular white matter changes, consistent with chronic small vessel disease    8/3/21 MRI BRAIN W/O  I reviewed the images personally and agree with the following read:   IMPRESSION:     1.  There is subacute/chronic subdural and " subarachnoid hemorrhages adjacent to the left frontal and temporal lobes. The maximum transverse dimension measures an approximately 29 mm. There is an approximately 6 mm midline shift towards right side.  2.  Mild cerebral volume loss.  3.  Mild chronic microvascular ischemic disease.    ASSESSMENT AND PLAN:  Problem List Items Addressed This Visit     SDH (subdural hematoma) (HCC)    Seizures (HCC)    Relevant Medications    levETIRAcetam (KEPPRA) 750 MG tablet      Other Visit Diagnoses     Traumatic subdural hematoma without loss of consciousness, initial encounter ([facility])              1. Seizures ([facility])  - levETIRAcetam (KEPPRA) 750 MG tablet; Take 1 Tablet by mouth 2 times a day.  Dispense: 180 Tablet; Refill: 2    2. Traumatic subdural hematoma without loss of consciousness, initial encounter ([facility])    3. SDH (subdural hematoma) ([facility])    The patient has not had seizures since her initial hospitalization for traumatic subdural hematoma. She remains on two antiepileptic drugs with both keppra and vimpat and has been seizure-free since her discharge. She had a spell recently that sounds like syncope and was not likely epileptic. No changes were made to her AED's today. I will follow-up with her in 4 months and will consider a EEG and a AED wean 6 months after the initial event.     FOLLOW-UP:   Return in about 4 months (around 2/28/2022).    Total time spent for the day for this patient unrelated to procedure time is: 48 minutes. I spent 28 minutes in face to face time and I spent 16 minutes pre-charting and 4 minutes in post-visit documentation.      Dr. Alexandr Stone D.O.  [facility] Neurology   Movement Disorders Specialist

## 2021-11-02 NOTE — OP THERAPY EVALUATION
"  Outpatient Physical Therapy  INITIAL EVALUATION    [facility] Outpatient Physical Therapy  [address]  [facility] [address]  Phone:  [phone]  Fax:  [phone]    Date of Evaluation: 11/02/2021    Patient: Roxana Cuba  YOB: 1960  MRN: [id number]     Referring Provider: Jamshid Martinez M.D.  [address]  [place] [address]   Referring Diagnosis Displaced fracture of olecranon process without intraarticular extension of right ulna, initial encounter for closed fracture [S52.021A]     Time Calculation  Start time: 1356  Stop time: 1441 Time Calculation (min): 45 minutes         Chief Complaint: Elbow Problem    Visit Diagnoses     ICD-10-CM   1. Displaced fracture of olecranon process without intraarticular extension of right ulna, initial encounter for closed fracture  S52.021A       Date of onset of impairment: No data found    Subjective:   History of Present Illness:     Mechanism of injury:  *Pt 26 min late to check in time (1:30pm check in time for 1:45pm appointment); pt and  reporting \"got lost.\"    Patient is a 61 year old female with a PMH including: Complex medical hx including liver transplant, psychiatric disorder, pulmonary HTN, CKD stage 3, anemia, chronic respiratory failure on 3L oxygen (continuous), sarcoids x 10 years.    Pt reporting had elbow fracture in September 2021 after a fall; Pt admitted to ED 8/3/21 for subdural hematoma with craniotomy performed 8/4/21; pt had home care nursing and PT. Pt does not have a history of falls. Pt was in a cast and then in a sling x2 weeks. Pt endorsing intermittent elbow pain at medial aspect which sometimes occurs at rest and aggravated with elbow movements. Pt is mostly sedentary and uses 's arm to use restroom or walk around the house; pt unable to lift oxygen pack independently. Denies numbness/tingling in the arm. Endorses hx of neck pain-improving. Pt " "reporting dropping objects in R hand since the accident.     Pt present with , Kenji, who assists with providing subjective history.  Pt present with oxygen tank.          Pain:     Current pain ratin    Location:  R medial olecranon     Quality: throbbing.    Progression:  Stable    Pain Comments::  Aggravating: occasionally at rest, bending/straightening elbow    Relieving: Advil occasionally   Hand dominance:  Right  Diagnostic Tests:     Diagnostic Tests Comments:  Pt reporting imaging from [facility]-not available to provider-will obtain consent for imaging  Treatments:     None    Activities of Daily Living:     Patient reported ADL status: Patient's current daily routine includes:  Hobbies: \"I have nothing fun in my life\"  Exercise: No current exercise routine in place    ADL's: Pt stands to shower with SPV, pt indep with brushing teeth. Dressing indep with UE, modA with lower body dressing and donning shoes. Pt has walker (pt reporting is non-functional); pt does not use walker. Pt sedentary and does not get up to use restroom or move about the house without arm hold on .  holds oxygen tank for patient.      Past Medical History:   Diagnosis Date   • Anemia    • Anesthesia     \"takes more to get me under, would rather be intubated\"    • Breath shortness     cont oxygen 5L (Preffered)   • Bronchitis         • Cardiomegaly    • Chronic fatigue and malaise    • Cirrhosis (HCC) 2011    Status post liver transplant at [facility]   • CKD (chronic kidney disease) stage 3, GFR 30-59 ml/min (HCC)    • Diabetes (HCC)     reports hx of, resolved w/weight loss. Reports still checking bloodsugars twice daily and using insulin PRN   • Fracture of left foot    • GERD (gastroesophageal reflux disease)         • H/O Clostridium difficile infection 17    reports \"16 months ago\". Current stool sample 17 neg   • Hemorrhagic disorder (HCC)     \"low platelets\" bruises easily    • " Hiatus hernia syndrome    • High cholesterol    • Hypothyroid    • Jaundice 2009   • Liver transplanted (HCC)    • Low back pain 02-17-17    and left foot, 7/10   • Mild aortic regurgitation and aortic valve sclerosis     • On home oxygen therapy     5 liters, Dr. Gaming   • Platelet disorder (HCC)     low platelets   • Pneumonia     aspiration,    • Psychiatric disorder     Mood disorder   • Pulmonary hypertension (HCC)        • S/P cholecystectomy    • Small bowel obstruction (HCC)     01-   • Splenomegaly    • VRE (vancomycin-resistant Enterococci)     02-17-17, pt declines knowledge of this     Past Surgical History:   Procedure Laterality Date   • CRANIOTOMY Left 8/4/2021    Procedure: CRANIOTOMY;  Surgeon: Tramaine Arnold III, M.D.;  Location: SURGERY [facility];  Service: Neurosurgery   • VENTRAL HERNIA REPAIR ROBOTIC XI N/A 11/12/2018    Procedure: VENTRAL HERNIA REPAIR ROBOTIC XI- FOR EPIGASTRIC INCISIONAL;  Surgeon: John H Ganser, M.D.;  Location: SURGERY [facility];  Service: General   • TURBINATE REDUCTION Bilateral 10/4/2018    Procedure: TURBINATE REDUCTION;  Surgeon: Silviano Erazo M.D.;  Location: SURGERY SAME DAY [facility];  Service: Ent   • NASAL RECONSTRUCTION Bilateral 10/4/2018    Procedure: NASAL RECONSTRUCTION- LATERA IMPLANTS;  Surgeon: Silviano Erazo M.D.;  Location: SURGERY SAME DAY [facility];  Service: Ent   • OTHER  12/11/2017    paniculectomy   • COLONOSCOPY N/A 3/27/2017    Procedure: COLONOSCOPY;  Surgeon: Osman Matt M.D.;  Location: SURGERY SAME DAY [facility];  Service:    • GASTROSCOPY N/A 3/27/2017    Procedure: GASTROSCOPY;  Surgeon: Osman Matt M.D.;  Location: SURGERY SAME DAY [facility];  Service:    • BREAST BIOPSY Left 2/21/2017    Procedure: BREAST BIOPSY - WIRE LOCALIZED EXCISONAL ;  Surgeon: Jane Zhao M.D.;  Location: SURGERY SAME DAY [facility];  Service:    • LUNG BIOPSY OPEN  11/2016   • OTHER ABDOMINAL SURGERY       Gasric Sleeve   • BONE MARROW ASPIRATION  3/16/2015    Performed by Marlena Hi M.D. at ENDOSCOPY [facility]   • BONE MARROW BIOPSY, NDL/TROCAR  3/16/2015    Performed by Marlena Hi M.D. at ENDOSCOPY [facility]   • RECOVERY  3/31/2014    Performed by Ir[facility]Rec[facility] Surgery at SURGERY [facility]   • RECOVERY  3/24/2014    Performed by Cath[facility]Recovery Surgery at SURGERY SAME DAY [facility]   • RECOVERY  1/21/2014    Performed by [facility] at SURGERY SAME DAY [facility]   • BRONCHOSCOPY-ENDO  11/15/2013    Performed by Ha Perez M.D. at ENDOSCOPY [facility]   • RECOVERY  2/27/2013    Performed by [facility] at SURGERY SAME DAY [facility]   • BONE MARROW ASPIRATION  12/31/2012    Performed by Josemanuel Real M.D. at ENDOSCOPY [facility]   • BONE MARROW BIOPSY, NDL/TROCAR  12/31/2012    Performed by Josemanuel Real M.D. at ENDOSCOPY [facility]   • GASTROSCOPY  9/28/2012    Performed by Aravind Richards M.D. at SURGERY [facility]   • SIGMOIDOSCOPY FLEX  9/26/2012    Performed by Kristopher Cardoso M.D. at ENDOSCOPY [facility]   • BRONCHOSCOPY-ENDO  6/19/2012    Performed by MARLENA MURILLO at ENDOSCOPY [facility]   • BRONCHOSCOPY-ENDO  5/29/2012    Performed by SUYAPA CAMARENA at ENDOSCOPY [facility]   • OTHER ABDOMINAL SURGERY  December 2011    Liver transplant at [facility] by Dr. Canada.   • GASTROSCOPY-ENDO  3/12/2010    Performed by CAMELIA SAMANO at ENDOSCOPY [facility]   • EXAM UNDER ANESTHESIA  11/11/2009    Performed by BIRD ABDI at SURGERY [facility]   • HEMORRHOIDECTOMY  11/11/2009    Performed by BIRD ABDI at SURGERY [facility]   • KELBY BY LAPAROSCOPY  9/19/2009    Performed by BIRD ABDI at SURGERY [facility]   • CARPAL TUNNEL RELEASE          • KELBY BY LAPAROSCOPY     • GASTRIC BYPASS LAPAROSCOPIC     • HERNIA REPAIR      x3   • HYSTERECTOMY, TOTAL ABDOMINAL          • OTHER       Breast reduction   • OTHER      liver transplant   • PB ANESTH,NOSE,SINUS SURGERY      x4   • TONSILLECTOMY       Social History     Tobacco Use   • Smoking status: Never Smoker   • Smokeless tobacco: Never Used   • Tobacco comment: avoid all tobacco products   Substance Use Topics   • Alcohol use: Not Currently     Alcohol/week: 0.0 oz     Family and Occupational History     Socioeconomic History   • Marital status:      Spouse name: Not on file   • Number of children: Not on file   • Years of education: Not on file   • Highest education level: Not on file   Occupational History   • Not on file       Objective     Static Posture     Comments  Vitals: (On room air; assessed in sitting)   HR: 71 bpm    SpO2: 96%  *No signs of distress        Postural Observations  Seated posture: poor    Additional Postural Observation Details  Forward head and rounded shoulders  Hands temperature cold to touch  Wasting noted at thenar eminences B  Carpal tunnel release scar noted at R wrist-chronic    Palpation     Right   Tenderness of the triceps.     Tenderness     Right Elbow   Tenderness in the lateral epicondyle, medial epicondyle and olecranon process.     Right Wrist/Hand   Tenderness in the lateral epicondyle, medial epicondyle and olecranon process.     Additional Tenderness Details  TTP common extensor and common flexor attachments and TTP ulnar cubital tunnel (not reproductive of common complaints)    Active Range of Motion     Cervical Spine   Flexion: within functional limits  Extension: decreased  Left lateral flexion: within functional limits  Right lateral flexion: within functional limits  Left rotation: within functional limits  Right rotation: within functional limits    Additional Active Range of Motion Details  Some limits with c/s extension; likely due to posturing and fatigue    No reproduction of pain with c/s AROM     Shoulder AROM:  WFL L (combo arm BTH and BTB without difficulties)    R  shoulder AROM: limited with pain and UT hiking at 90 deg with increased R elbow pain-flexion and abd  *pt difficulty relaxing with pain at similar ranges PROM    Elbow flexion and extension WFL with end range pain (AROM and PROM)          Joint Play   Right Elbow     Proximal radioulnar joint: hypomobile    Distal radioulnar joint: hypomobile      Additional elbow joint play details: Pain with testing to proximal and distal RU joints R        Strength:      Additional Strength Details   strength measured in # (pt is R hand dominant)  L: 22, 21, 25   R: 27, 24, 25  Isometric strength testing at elbows:  L: WFL flex and extension  R: flexion mod and painful; extension mod and painful    Tests     Right Shoulder   Negative Spurling's sign.     General Comments     Spine Comments   Radial pulse intact R        Therapeutic Exercises (CPT 44901):     1. Pt education, trial of PT to address elbow complaints    2. Pt education, re: discussion re: AD for incr independence; discussed bringing walker to PT appt next session to assess    20. UPOC: 12/31/21    Therapeutic Treatments and Modalities: , **Precautions: PACEMAKER    Time-based treatments/modalities:           Assessment, Response and Plan:   Impairments: abnormal gait, abnormal or restricted ROM, activity intolerance, impaired physical strength and lacks appropriate home exercise program    Assessment details:  Patient is a 61 year old female (R hand dominant) with complex medical hx (see above) who presents to therapy today with oxygen tank and , Kenji, who assists with providing subjective history. Pt admitting to impaired memory. Pt reporting had elbow fracture in September 2021 after a fall; Pt admitted to ED 8/3/21 for subdural hematoma with craniotomy performed 8/4/21; uncertain if timeline reported to therapist is correct as ED note reporting GLF 5 weeks prior to ED admission. Pt admitting to R hemisided pain following GLF and intermittent R medial  olecranon pain; sometimes continuous, aggravated with movements. Pt currently receiving a lot of assistance with ADL's from  (see above); this includes all ambulation, holds onto  for support; does not use AD. Pt reporting has walker at home but does not use because it's uneven. Advised pt and  to bring in to assess.     Exam findings suggestive of full range of elbow motion with end range pain, limited shoulder ROM, TTP triceps and olecranon, poor postural awareness, painful and hypomobile prox and distal radioulnar joints. Atrophy noted at thenar eminence and B hands cold to touch (chronic). Pt may benefit from skilled physical therapy in order to address above impairments in order to improve QOL and return to reported ADL's.     **Precautions: PACEMAKER    Barriers to therapy:  Cognition, comorbidities, psychosocial and poorly tolerated treatments  Prognosis comment:  Fair/poor  Goals:   Short Term Goals:   1. Pt will be independent with written HEP.  2. Pt will be assessed for appropriate AD for increased independence and safety within the home.  3. Pt will complete UEFI.    Short term goal time span:  2-4 weeks      Long Term Goals:    1. Pt will be independent with written HEP.  2. Pt will have a sig improvement in UEFI >/= SÁNCHEZ/MCID.  3. Pt will demonstrate full and pain-free elbow AROM in order to assist with ADL's and improve QOL.  4. Pt will demonstrate postural imp  rovements with at least 75% exercises performed in sessions.  5. Pt will demonstrate full and shoulder AROM in order to assist with ADL's and improve QOL.  Long term goal time span:  6-8 weeks    Plan:   Therapy options:  Physical therapy treatment to continue  Planned therapy interventions:  Neuromuscular Re-education (CPT 67561), Manual Therapy (CPT 36107), Therapeutic Exercise (CPT 18917) and Therapeutic Activities (CPT 29766)  Frequency: 1-2x/wk.  Duration in weeks:  8  Discussed with:  Patient  Plan details:  UPOC:  12/31/21          Functional Assessment Used        Referring provider co-signature:  I have reviewed this plan of care and my co-signature certifies the need for services.    Certification Period: 11/02/2021 to 12/31/21    Physician Signature: ________________________________ Date: ______________

## 2021-11-04 NOTE — TELEPHONE ENCOUNTER
Request came in for additional info, submitted via cover my meds  Roxana Cuba Key: [id number] - PA Case ID: [id number] help? Call us at [phone]  Status  Sent to [employer] [facility]  Drug  Ambrisentan 10MG tablets  Form  Med[employer] ePA Form 2017 NCPDP

## 2021-11-04 NOTE — TELEPHONE ENCOUNTER
----- Message from Darrion Rudolph R.N. sent at 11/3/2021  4:42 PM PDT -----  Octavio Lombardi,     This patient's  was in clinic today with RO and stated she has yet to receive her ambrisentan medication. I saw you had submitted a PA on 10/25/21. Just wanted to see you could please follow up? Please let me know if you need anything.     Thank you.

## 2021-11-04 NOTE — CARE PLAN
Problem: Pain Management  Goal: Pain level will decrease to patient's comfort goal  Outcome: PROGRESSING AS EXPECTED  Patient refusing oral analgesics. PRN IV analgesics administered. Rounding on patient frequently and reassessing pain. Encouraging use of nonpharmacologic interventions.     Problem: Psychosocial Needs:  Goal: Level of anxiety will decrease  Outcome: PROGRESSING AS EXPECTED  Allowing patient to voice feelings. Anxiolytics given as prescribed.        02/4/2021

## 2021-11-05 NOTE — TELEPHONE ENCOUNTER
Roxana Cuba Key: [id number] - PA Case ID: [id number] help? Call us at [phone]  Outcome  Approvedon November 4  The request has been approved. The authorization is effective from 11/04/2021 to 11/03/2022, as long as the member is enrolled in their current health plan. The request was approved as submitted. This request was approved with a quantity limit of 30 tablets per 30 days. A written notification letter will follow with additional details.  Drug  Ambrisentan 10MG tablets  Form  Med[employer] ePA Form 2017 NCPDP

## 2021-11-05 NOTE — OP THERAPY DISCHARGE SUMMARY
Outpatient Physical Therapy  DISCHARGE SUMMARY NOTE      [facility] Outpatient Physical Therapy  [address]  [place] [address]  Phone:  [phone]  Fax:  [phone]    Date of Visit: 11/05/2021    Patient: Roxana Cuba  YOB: 1960  MRN: [id number]     Referring Provider: No referring provider defined for this encounter.   Referring Diagnosis No admission diagnoses are documented for this encounter.         Functional Assessment Used        Your patient is being discharged from Physical Therapy with the following comments:   · pt wishing to DC    Comments:  Pt contacting PT office to DC remaining sessions as is too sick.      Limitations Remaining:  See PT eval. Current limitations unknown.    Recommendations:  DC per pt request.     Pete Harper, PT    Date: 11/5/2021

## 2021-11-08 PROBLEM — R42 DIZZINESS: Status: ACTIVE | Noted: 2021-01-01

## 2021-11-08 NOTE — TELEPHONE ENCOUNTER
RO    Pt is calling regarding a refill of ambrisentan (LETAIRIS) 10 MG tablet. The pharmacy needs this to be confirmed so that it can be rushed to the pt.  [facility] - [address] [address]   Phone:  [phone]  Fax:  [phone]     Patient is completly out at this time.    Thank you, Mei LYLE

## 2021-11-08 NOTE — TELEPHONE ENCOUNTER
RO    PT's , Otis, called as PT has been gaining weight and feet are very swollen. PCP is concerned about the swelling. Wanting to know if she needs to adjust any medication.     Best Contact Number: [phone]    Thank you,    Alexia SHAH

## 2021-11-08 NOTE — TELEPHONE ENCOUNTER
Outcome: Left Message -CARMELA    Please transfer to Patient Outreach Team at [phone] when patient returns call.      Attempt # 2

## 2021-11-08 NOTE — PATIENT INSTRUCTIONS
Heart Failure, Self Care  Heart failure is a serious condition. This document explains the things you need to do to take care of yourself after a heart failure diagnosis. You may be asked to change your diet, take certain medicines, and make other lifestyle changes in order to stay as healthy as possible. Your health care provider may also give you more specific instructions. If you have problems or questions, contact your health care provider.  What are the risks?  Having heart failure puts you at higher risk for certain problems. These problems can get worse if you do not take good care of yourself. Problems may include:  · Blood clotting problems. This may cause a stroke.  · Damage to the kidneys, liver, or lungs.  · Abnormal heart rhythms.  Supplies needed:  · Scale for monitoring weight.  · Blood pressure monitor.  · Notebook.  · Medicines.  How to care for yourself when you have heart failure  Medicines  Take over-the-counter and prescription medicines only as told by your health care provider. Medicines reduce the workload of your heart, slow the progression of heart failure, and improve symptoms. Take your medicines every day.  · Do not stop taking your medicine unless your health care provider tells you to do so.  · Do not skip any dose of medicine.  · Refill your prescriptions before you run out of medicine.  Eating and drinking    · Eat heart-healthy foods. Talk with a dietitian to make an eating plan that is right for you.  ? Choose foods that contain no trans fat and are low in saturated fat and cholesterol. Healthy choices include fresh or frozen fruits and vegetables, fish, lean meats, legumes, fat-free or low-fat dairy products, and whole-grain or high-fiber foods.  ? Limit salt (sodium) if told by your health care provider. Sodium restriction may reduce symptoms of heart failure. Ask a dietitian to recommend heart-healthy seasonings.  ? Use healthy cooking methods instead of frying. Healthy methods  include roasting, grilling, broiling, baking, poaching, steaming, and stir-frying.  · Limit your fluid intake, if directed by your health care provider. Fluid restriction may reduce symptoms of heart failure.  Alcohol use  · Do not drink alcohol if:  ? Your health care provider tells you not to drink.  ? Your heart was damaged by alcohol, or you have severe heart failure.  ? You are pregnant, may be pregnant, or are planning to become pregnant.  · If you drink alcohol:  ? Limit how much you use to:  § 0-1 drink a day for women.  § 0-2 drinks a day for men.  ? Be aware of how much alcohol is in your drink. In the [place] one drink equals one 12 oz bottle of beer (355 mL), one 5 oz glass of wine (148 mL), or one 1½ oz glass of hard liquor (44 mL).  Lifestyle    · Do not use any products that contain nicotine or tobacco, such as cigarettes, e-cigarettes, and chewing tobacco. If you need help quitting, ask your health care provider.  ? Do not use nicotine gum or patches before talking to your health care provider.  · Do not use illegal drugs.  · Work with your health care provider to safely reach the right body weight.  · Do physical activity if told by your health care provider. Talk to your health care provider before you begin an exercise if:  ? You are an older adult.  ? You have severe heart failure.  · Learn to manage stress. If you need help to do this, ask your health care provider.  · Participate in or seek rehabilitation as needed to keep or improve your independence and quality of life.  · Plan rest periods when you get tired.  Monitoring important information    · Weigh yourself every day. This will help you to notice if too much fluid is building up in your body.  ? Weigh yourself every morning after you urinate and before you eat breakfast.  ? Wear the same amount of clothing each time you weigh yourself.  ? Record your daily weight. Provide your health care provider with your weight record.  · Monitor and  record your pulse and blood pressure as told by your health care provider.  Dealing with extreme temperatures  · If the weather is extremely hot:  ? Avoid vigorous physical activity.  ? Use air conditioning or fans, or find a cooler location.  ? Avoid caffeine and alcohol.  ? Wear loose-fitting, lightweight, and light-colored clothing.  · If the weather is extremely cold:  ? Avoid vigorous activity.  ? Layer your clothes.  ? Wear mittens or gloves, a hat, and a scarf when you go outside.  ? Avoid alcohol.  Follow these instructions at home:  · Stay up to date with vaccines. Pneumococcal and flu (influenza) vaccines are especially important in preventing infections of the airways.  · Keep all follow-up visits as told by your health care provider. This is important.  Contact a health care provider if you:  · Have a rapid weight gain.  · Have increasing shortness of breath.  · Are unable to participate in your usual physical activities.  · Get tired easily.  · Cough more than normal, especially with physical activity.  · Lose your appetite or feel nauseous.  · Have any swelling or more swelling in areas such as your hands, feet, ankles, or abdomen.  · Are unable to sleep because it is hard to breathe.  · Feel like your heart is beating quickly (palpitations).  · Become dizzy or light-headed when you stand up.  Get help right away if you:  · Have trouble breathing.  · Notice or your family notices a change in your awareness, such as having trouble staying awake or concentrating.  · Have pain or discomfort in your chest.  · Have an episode of fainting (syncope).  These symptoms may represent a serious problem that is an emergency. Do not wait to see if the symptoms will go away. Get medical help right away. Call your local emergency services (911 in the [place] Do not drive yourself to the hospital.  Summary  · Heart failure is a serious condition. To care for yourself, you may be asked to change your diet, take certain  medicines, and make other lifestyle changes.  · Take your medicines every day. Do not stop taking them unless your health care provider tells you to do so.  · Eat heart-healthy foods, such as fresh or frozen fruits and vegetables, fish, lean meats, legumes, fat-free or low-fat dairy products, and whole-grain or high-fiber foods.  · Ask your health care provider if you have any alcohol restrictions. You may have to stop drinking alcohol if you have severe heart failure.  · Contact your health care provider if you notice problems, such as rapid weight gain or a fast heartbeat. Get help right away if you faint, or have chest pain or trouble breathing.  This information is not intended to replace advice given to you by your health care provider. Make sure you discuss any questions you have with your health care provider.  Document Released: 04/01/2020 Document Revised: 03/31/2020 Document Reviewed: 04/01/2020  Elsevier Patient Education © 2020 [employer]

## 2021-11-08 NOTE — TELEPHONE ENCOUNTER
Spoke to Otis over phone. He states patient started to gain weight 12 days ago when she was started on Prednisone 2.5 mg BID. In that time frame patient has gained 16 lbs. Patient also has bilateral edema up to her knees. Per Otis, her legs are just shy of being double in size. There is no pitting present. Patient denies all other symptoms.     Per Otis, the Prednisone makes patient feel better overall, besides the weight gain and edema.     Patient is taking her Lasix 20 mg daily. She has very little appetite and has had no increase in sodium consumption.     Recommended that patient elevate feet/legs as much as possible.    To RO: Please advise with further recommendations

## 2021-11-09 NOTE — TELEPHONE ENCOUNTER
RO-      Pt's  called and said that her legs were swelling due to her prednisONE (DELTASONE) 2.5 MG Tab. He asked for a call back to discuss further.  His ph# [phone]    Thank you.    -Chuck

## 2021-11-10 NOTE — TELEPHONE ENCOUNTER
S/W Otis, on prednisone 2.5mg BID, will be taking until at least December 1st. Ordered by endocrin Dr. Weiner. Had 1/2 [employer] burrito last night, Kelsie. Advised that all those foods have high levels of salt. Likely perpetuating the weight gain.     Will try to lower salt content and then update us on Friday with weight goal

## 2021-11-10 NOTE — TELEPHONE ENCOUNTER
Patient  called asking if Dr. Stone can write a prescription for Vimpat. Original prescription was written in September and  2 days ago. Patient is taking 100 Mg 1 tab twice daily.  Primary care advised patient to call Dr. Stone. Please advise. Thank you.MIHAELA Fitch.

## 2021-11-11 NOTE — ED NOTES
To ER Room 14 by wheelchair.  Pt on 3L O2 per home routine.   @ BS.  Pt is reporting pain to L shoulder and L hand and rates her pain 7/10.

## 2021-11-11 NOTE — ED TRIAGE NOTES
"Chief Complaint   Patient presents with   • Head Injury     pt tripped and fell into rocks 2 days ago, hit L side of her face - bruising noted on outer corner of L, c/o headache; pt denies any LOC, does not use blood thinners      /72   Pulse 70   Temp 36.4 °C (97.5 °F) (Temporal)   Resp 16   Ht 1.753 m (5' 9\")   Wt 71.2 kg (156 lb 15.5 oz)   LMP 01/03/2000   SpO2 94%   BMI 23.18 kg/m²     Pt BIB family for above concern, pt states headache is much worse today    Has this patient been vaccinated for COVID - YES  If not, would they like to be vaccinated while in the ER if eligible?  n/a  Would the patient like to speak with the ERP about the possibility of receiving the COVID vaccine today before making a decision? n/a    "

## 2021-11-12 NOTE — ED PROVIDER NOTES
"ED Provider Note    CHIEF COMPLAINT  Chief Complaint   Patient presents with   • Head Injury     pt tripped and fell into rocks 2 days ago, hit L side of her face - bruising noted on outer corner of L, c/o headache; pt denies any LOC, does not use blood thinners        HPI  Roxana Cuba is a 61 y.o. female who presents with head trauma.  Left-sided headache.  Patient tripped falling hitting the left side of her head and face on some rocks 2 days ago.  She did not lose consciousness.  She has had progressive throbbing nonradiating discomfort over her left temple and above her left eye.  Denies any weakness numbness neurologic symptoms.  Only other area of injury is the left shoulder.  She has some discomfort with moving it.  She still able ambulate.  Denies chest pain abdominal pain.    Patient has a history of subdural hematoma status post craniotomy with Dr. Arnold in August of this past year.  Patient has a history of pancytopenia being evaluated by heme-onc.    REVIEW OF SYSTEMS  As per HPI, all other systems reviewed and negative    PAST MEDICAL HISTORY  Past Medical History:   Diagnosis Date   • Anemia    • Anesthesia     \"takes more to get me under, would rather be intubated\"    • Breath shortness     cont oxygen 5L (Preffered)   • Bronchitis      2016   • Cardiomegaly    • Chronic fatigue and malaise    • Cirrhosis (HCC) December 2011    Status post liver transplant at [facility]   • CKD (chronic kidney disease) stage 3, GFR 30-59 ml/min (HCC)    • Diabetes (HCC)     reports hx of, resolved w/weight loss. Reports still checking bloodsugars twice daily and using insulin PRN   • Fracture of left foot    • GERD (gastroesophageal reflux disease)         • H/O Clostridium difficile infection 02-17-17    reports \"16 months ago\". Current stool sample 01-26-17 neg   • Hemorrhagic disorder (HCC)     \"low platelets\" bruises easily    • Hiatus hernia syndrome    • High cholesterol    • Hypothyroid    • Jaundice 2009   • " Liver transplanted (HCC)    • Low back pain 02-17-17    and left foot, 7/10   • Mild aortic regurgitation and aortic valve sclerosis     • On home oxygen therapy     5 liters, Dr. Gaming   • Platelet disorder (HCC)     low platelets   • Pneumonia     aspiration,    • Psychiatric disorder     Mood disorder   • Pulmonary hypertension (HCC)        • S/P cholecystectomy    • Small bowel obstruction (HCC)     01-   • Splenomegaly    • VRE (vancomycin-resistant Enterococci)     02-17-17, pt declines knowledge of this       FAMILY HISTORY  Family History   Problem Relation Age of Onset   • Other Father         Unknown (dead 10 years)   • Diabetes Father    • Heart Disease Father    • Hypertension Father    • Hyperlipidemia Father    • Stroke Father    • Non-contributory Mother    • Hyperlipidemia Mother    • Alcohol/Drug Mother    • Cancer Paternal Aunt    • Alcohol/Drug Maternal Grandmother    • Alcohol/Drug Maternal Grandfather        SOCIAL HISTORY  Social History     Tobacco Use   • Smoking status: Never Smoker   • Smokeless tobacco: Never Used   • Tobacco comment: avoid all tobacco products   Vaping Use   • Vaping Use: Never used   Substance Use Topics   • Alcohol use: Not Currently     Alcohol/week: 0.0 oz   • Drug use: No       SURGICAL HISTORY  Past Surgical History:   Procedure Laterality Date   • CRANIOTOMY Left 8/4/2021    Procedure: CRANIOTOMY;  Surgeon: Tramaine Arnold III, M.D.;  Location: SURGERY [facility];  Service: Neurosurgery   • VENTRAL HERNIA REPAIR ROBOTIC XI N/A 11/12/2018    Procedure: VENTRAL HERNIA REPAIR ROBOTIC XI- FOR EPIGASTRIC INCISIONAL;  Surgeon: John H Ganser, M.D.;  Location: SURGERY [facility];  Service: General   • TURBINATE REDUCTION Bilateral 10/4/2018    Procedure: TURBINATE REDUCTION;  Surgeon: Silviano Erazo M.D.;  Location: SURGERY SAME DAY [facility];  Service: Ent   • NASAL RECONSTRUCTION Bilateral 10/4/2018    Procedure: NASAL RECONSTRUCTION- LATERA  IMPLANTS;  Surgeon: Silviano Erazo M.D.;  Location: SURGERY SAME DAY [facility];  Service: Ent   • OTHER  12/11/2017    paniculectomy   • COLONOSCOPY N/A 3/27/2017    Procedure: COLONOSCOPY;  Surgeon: Osman Matt M.D.;  Location: SURGERY SAME DAY [facility];  Service:    • GASTROSCOPY N/A 3/27/2017    Procedure: GASTROSCOPY;  Surgeon: Osman Matt M.D.;  Location: SURGERY SAME DAY [facility];  Service:    • BREAST BIOPSY Left 2/21/2017    Procedure: BREAST BIOPSY - WIRE LOCALIZED EXCISONAL ;  Surgeon: Jane Zhao M.D.;  Location: SURGERY SAME DAY [facility];  Service:    • LUNG BIOPSY OPEN  11/2016   • OTHER ABDOMINAL SURGERY      Gasric Sleeve   • BONE MARROW ASPIRATION  3/16/2015    Performed by Marlena Hi M.D. at ENDOSCOPY [facility]   • BONE MARROW BIOPSY, NDL/TROCAR  3/16/2015    Performed by Marlena Hi M.D. at ENDOSCOPY [facility]   • RECOVERY  3/31/2014    Performed by [facility] at SURGERY [facility]   • RECOVERY  3/24/2014    Performed by [facility] at SURGERY SAME DAY [facility]   • RECOVERY  1/21/2014    Performed by [facility] at SURGERY SAME DAY [facility]   • BRONCHOSCOPY-ENDO  11/15/2013    Performed by aH Perez M.D. at ENDOSCOPY [facility]   • RECOVERY  2/27/2013    Performed by [facility] at SURGERY SAME DAY [facility]   • BONE MARROW ASPIRATION  12/31/2012    Performed by Josemanuel Real M.D. at ENDOSCOPY [facility]   • BONE MARROW BIOPSY, NDL/TROCAR  12/31/2012    Performed by Josemanuel Real M.D. at [facility]COPY [facility]   • GASTROSCOPY  9/28/2012    Performed by Aravind Richards M.D. at [facility]   • SIGMOIDOSCOPY FLEX  9/26/2012    Performed by Kristopher Cardoso M.D. at [facility]   • BRONCHOSCOPY-ENDO  6/19/2012    Performed by MARLENA MURILLO at [facility]   • BRONCHOSCOPY-ENDO  5/29/2012    Performed by SUYAPA CAMARENA at  ENDOSCOPY [facility]   • OTHER ABDOMINAL SURGERY  December 2011    Liver transplant at [facility] by Dr. Canada.   • GASTROSCOPY-ENDO  3/12/2010    Performed by CAMELIA SAMANO at ENDOSCOPY [facility]   • EXAM UNDER ANESTHESIA  11/11/2009    Performed by BIRD ABDI at SURGERY [facility]   • HEMORRHOIDECTOMY  11/11/2009    Performed by BIRD ABDI at SURGERY [facility]   • KELBY BY LAPAROSCOPY  9/19/2009    Performed by BIRD ABDI at SURGERY [facility]   • CARPAL TUNNEL RELEASE          • KELBY BY LAPAROSCOPY     • GASTRIC BYPASS LAPAROSCOPIC     • HERNIA REPAIR      x3   • HYSTERECTOMY, TOTAL ABDOMINAL          • OTHER      Breast reduction   • OTHER      liver transplant   • PB ANESTH,NOSE,SINUS SURGERY      x4   • TONSILLECTOMY         CURRENT MEDICATIONS  Home Medications     Reviewed by Lindsay Anderson R.N. (Registered Nurse) on 11/11/21 at 1523  Med List Status: Not Addressed   Medication Last Dose Status   acetaminophen (TYLENOL) 500 MG Tab  Active   ambrisentan (LETAIRIS) 10 MG tablet  Active   docusate sodium (COLACE) 100 MG Cap  Active   fludrocortisone (FLORINEF) 0.1 MG Tab  Active   furosemide (LASIX) 20 MG Tab  Active   gabapentin (NEURONTIN) 100 MG Cap  Active   Home Care Oxygen  Active   levETIRAcetam (KEPPRA) 750 MG tablet  Active   levothyroxine (SYNTHROID) 137 MCG Tab  Active   mycophenolate (CELLCEPT) 250 MG Cap  Active   Naloxegol Oxalate (MOVANTIK) 25 MG Tab  Active   omeprazole (PRILOSEC) 40 MG delayed-release capsule  Active   ondansetron (ZOFRAN ODT) 4 MG TABLET DISPERSIBLE  Active   oxyCODONE immediate release (ROXICODONE) 10 MG immediate release tablet  Active   pravastatin (PRAVACHOL) 20 MG Tab  Active   prednisONE (DELTASONE) 2.5 MG Tab  Active   sertraline (ZOLOFT) 100 MG Tab  Active   tadalafil (CIALIS) 20 MG tablet  Active   therapeutic multivitamin-minerals (THERAGRAN-M) Tab  Active   traZODone (DESYREL) 50 MG Tab  Active           "      ALLERGIES  Allergies   Allergen Reactions   • Rhubarb Anaphylaxis   • Organic Nitrates Unspecified     Nitroglycerin products should be avoided with the use of PDE-5 inhibitors as the combination can result in severe hypotension.  RD clarified with pt: Nitrates in food are okay and pt does not want nitrates to be listed as food allergy. Pt only avoids medications with nitrates.    • Other Drug      Any medication that may interact with cyclosporine, tacrolimus, sirolimus, or prograf (due to hx of liver transplant)    • Vancomycin Hcl Unspecified     Causes red man syndrome when infused to fast, ok if slowed infusion   • Nsaids Unspecified     Can not take due to hx of liver transplant        PHYSICAL EXAM  VITAL SIGNS: /72   Pulse 70   Temp 36.4 °C (97.5 °F) (Temporal)   Resp 16   Ht 1.753 m (5' 9\")   Wt 71.2 kg (156 lb 15.5 oz)   LMP 01/03/2000   SpO2 94%   BMI 23.18 kg/m²    Constitutional:  Well developed, Well nourished, No acute distress, Non-toxic appearance.   HENT: Ecchymosis over the left eye and left temporal area.  No deformity.  Eyes: PERRL, EOMI  Neck: Mild tenderness diffusely of the cervical spine  Cardiovascular: Normal heart rate, Normal rhythm  Thorax & Lungs: No respiratory distress  Extremities: Intact distal pulses, atraumatic, full range of motion.  Tenderness of the left proximal humerus shoulder region.  No focal tenderness or deformity.  Neurologic: Alert, Normal motor function, Normal sensory function, No focal deficits noted, normal gait    RADIOLOGY/PROCEDURES  DX-SHOULDER 2+ LEFT   Final Result      1.  No acute findings.      2.  Sclerotic densities in the proximal left humerus are unchanged from 2018 and likely represent prior bone infarct or enchondroma. No aggressive features identified.      3.  Blunting of the left costophrenic angle may represent scarring, atelectasis, or a left pleural effusion. Findings are unchanged from the prior chest radiograph in August. "      CT-HEAD W/O   Final Result      1.  Interval decrease in size in left frontal subdural hematoma deep to craniotomy flap.      2.  No definite evidence of new hemorrhage though evaluation limited by patient motion artifact.      3.  Generalized atrophy.      CT-CSPINE WITHOUT PLUS RECONS   Final Result      No acute fracture or dislocation seen in the CT scan of the cervical spine.         Imaging is interpreted by radiologist    COURSE & MEDICAL DECISION MAKING  Patient presents to the ER after head injury.  Has history of recent subdural hematoma status post evacuation.  Has history of pancytopenia that is being evaluated by heme-onc.  I ordered CT scan of the head cervical spine and left shoulder.    Diagnostic imaging returned as above.  Subdural hematoma is stable and improved.  Cervical spine was negative and shoulder x-ray was negative.  I do have concern because of the patient's history of pancytopenia.  I would like her to call her neurosurgeon to make an appointment.  Perhaps interval imaging would be valuable.  I have given patient precautions to return to the emergency department if she has any increasing headache, neurologic symptoms or concern    FINAL IMPRESSION  1.  Closed head injury  2.  History of recent subdural hematoma, improved        This dictation was created using voice recognition software. The accuracy of the dictation is limited to the abilities of the software. I expect there may be some errors of grammar and possibly content. The nursing notes were reviewed and certain aspects of this information were incorporated into this note.      Electronically signed by: Yuri Fitzgerald M.D., 11/11/2021 4:26 PM

## 2021-11-12 NOTE — ED NOTES
Discharge instructions given to pt with verbalized understanding.  Pt assisted out of the ER in wheelchair.  Pt discharged on 3L O2 per home routine.

## 2021-11-15 NOTE — TELEPHONE ENCOUNTER
RO    PT's , Otis, called in regards to the tadalafil (CIALIS) 20 MG tablet because the pharmacy was only able to give them 10 of the 100 prescribed. Insurance is only approving 10 at this time. They are requesting we reach out to get insurance to authorize the remaining 90.     [facility] Contact Number: [phone]    Thank you,    Alexia SHAH

## 2021-11-15 NOTE — TELEPHONE ENCOUNTER
"S/W [facility], they had no answers and stated they didn't know why \"its up to insurance, what do you want me to do\", no messages back about needing a PA or anything else.     After 30 minutes on hold with [employer] for [facility], [facility] pharmacist said the 100 tablet supply exceeded the maximum quantity and that it could have been filled for 30 or 90 days. She is not sure why the pharmacy only gave 10 pills.     Asked [facility] to verify quantity as we get lots of requests from [facility] stating they require a 100 day supply.       For this member not allowed larger than 90 days for any medication, per [facility], only some medications/members get the approval from [facility] to have 100 day supply. Maintenance medications that have been filled for years or so.     Total Time on call 45min.     S/W Otis, aware he can  RX tomorrow for 90 days.   "

## 2021-11-18 NOTE — ASSESSMENT & PLAN NOTE
She continues to have bouts of dizziness.  She just feels light headed and then falls over.  She is using a wheelchair and walker.  She is overall feeling quite weak.

## 2021-11-18 NOTE — PROGRESS NOTES
Subjective:     Chief Complaint   Patient presents with   • Follow-Up       Roxana Cuba is a 61 y.o. female here today for evaluation and management of:    Dizziness  She continues to have bouts of dizziness.  She just feels light headed and then falls over.  She is using a wheelchair and walker.  She is overall feeling quite weak.    Adrenal insufficiency ([facility])  Allergist recently changed her steroid medications and increased it because of her illness.  She is not feeling very good on it.    Seizures ([facility])  Patient needs refills of her medications.  She has not had any further seizures.  She has not spoken to the neurosurgeon to see how long he would like her to be on this.       Allergies   Allergen Reactions   • Rhubarb Anaphylaxis   • Organic Nitrates Unspecified     Nitroglycerin products should be avoided with the use of PDE-5 inhibitors as the combination can result in severe hypotension.  RD clarified with pt: Nitrates in food are okay and pt does not want nitrates to be listed as food allergy. Pt only avoids medications with nitrates.    • Other Drug      Any medication that may interact with cyclosporine, tacrolimus, sirolimus, or prograf (due to hx of liver transplant)    • Vancomycin Hcl Unspecified     Causes red man syndrome when infused to fast, ok if slowed infusion   • Nsaids Unspecified     Can not take due to hx of liver transplant        Current medicines (including changes today)  Current Outpatient Medications   Medication Sig Dispense Refill   • ambrisentan (LETAIRIS) 10 MG tablet Take 1 Tablet by mouth every day. 30 Tablet 0   • prednisONE (DELTASONE) 2.5 MG Tab Take 2.5 mg by mouth 2 times a day.     • levETIRAcetam (KEPPRA) 750 MG tablet Take 1 Tablet by mouth 2 times a day. 180 Tablet 2   • omeprazole (PRILOSEC) 40 MG delayed-release capsule Take 1 Capsule by mouth every day. 90 Capsule 3   • pravastatin (PRAVACHOL) 20 MG Tab Take 1 Tablet by mouth every day. 90 Tablet 3   •  furosemide (LASIX) 20 MG Tab Take 1 Tablet by mouth every day. Indications: Edema 60 Tablet 11   • sertraline (ZOLOFT) 100 MG Tab TAKE 1 TABLET BY MOUTH EVERY DAY 90 Tablet 1   • ondansetron (ZOFRAN ODT) 4 MG TABLET DISPERSIBLE Take 1 Tablet by mouth every 8 hours as needed for Nausea. 30 Tablet 2   • traZODone (DESYREL) 50 MG Tab Take 1 Tablet by mouth at bedtime. 901 Tablet 3   • Home Care Oxygen Inhale 3 L/min continuous. Oxygen dose range: 3 L/min  Respiratory route via: Nasal Cannula   Oxygen supplier: Preferred         • gabapentin (NEURONTIN) 100 MG Cap Take 100 mg by mouth.     • fludrocortisone (FLORINEF) 0.1 MG Tab Take 0.2 mg by mouth every day. Indications: addisons disease     • mycophenolate (CELLCEPT) 250 MG Cap Take 250 mg by mouth 2 times a day. Indications: Liver Transplant Recipient     • docusate sodium (COLACE) 100 MG Cap Take 100 mg by mouth 1 time a day as needed for Constipation.     • oxyCODONE immediate release (ROXICODONE) 10 MG immediate release tablet Take 10 mg by mouth every 6 hours as needed for Moderate Pain.     • acetaminophen (TYLENOL) 500 MG Tab Take 500 mg by mouth 1 time a day as needed for Fever.     • levothyroxine (SYNTHROID) 137 MCG Tab Take 1 Tablet by mouth every morning on an empty stomach. 30 Tablet 0   • Naloxegol Oxalate (MOVANTIK) 25 MG Tab Take 25 mg by mouth every day.     • therapeutic multivitamin-minerals (THERAGRAN-M) Tab Take 1 tablet by mouth every day.     • tadalafil (CIALIS) 20 MG tablet Take 1 Tablet by mouth every day. 90 Tablet 3   • lacosamide (VIMPAT) 100 MG Tab tablet Take 1 Tablet by mouth 2 times a day for 90 days. 60 Tablet 2     No current facility-administered medications for this visit.       She  has a past medical history of Anemia, Anesthesia, Breath shortness, Bronchitis ( ), Cardiomegaly, Chronic fatigue and malaise, Cirrhosis (HCC) (December 2011), CKD (chronic kidney disease) stage 3, GFR 30-59 ml/min (HCC), Diabetes (HCC), Fracture of  left foot, GERD (gastroesophageal reflux disease), H/O Clostridium difficile infection (02-17-17), Hemorrhagic disorder ([facility]), Hiatus hernia syndrome, High cholesterol, Hypothyroid, Jaundice (2009), Liver transplanted ([facility]), Low back pain (02-17-17), Mild aortic regurgitation and aortic valve sclerosis ( ), On home oxygen therapy, Platelet disorder ([facility]), Pneumonia, Psychiatric disorder, Pulmonary hypertension ([facility]), S/P cholecystectomy, Small bowel obstruction ([facility]), Splenomegaly, and VRE (vancomycin-resistant Enterococci).    Patient Active Problem List    Diagnosis Date Noted   • Dizziness 11/08/2021   • Debility 09/12/2021   • Hypomagnesemia 09/11/2021   • Hypocalcemia 09/11/2021   • Campylobacter gastroenteritis 09/11/2021   • Closed fracture of right elbow with routine healing 08/23/2021   • Seizures ([facility]) 08/10/2021   • SDH (subdural hematoma) ([facility]) 08/03/2021   • Hypernatremia 08/03/2021   • History of pneumonia 08/03/2021   • Cold sore 04/04/2019   • Adrenal insufficiency ([facility]) 03/05/2019   • Pancytopenia ([facility]) 03/05/2019   • Mixed obsessional thoughts and acts 01/30/2019   • Recurrent major depressive disorder, in partial remission ([facility]) 01/30/2019   • Incisional hernia, without obstruction or gangrene 01/30/2019   • History of infection with vancomycin resistant Enterococcus (VRE) 01/30/2019   • High risk medication use 08/28/2018   • History of aspiration pneumonia 02/06/2018   • Hiatal hernia 02/06/2018   • Chronic nausea 01/25/2018   • Slow transit constipation 11/03/2017   • S/P bariatric surgery 10/31/2017   • Steroid-induced hyperglycemia 10/03/2017   • History of small bowel obstruction 07/14/2017   • History of Clostridium difficile infection 07/14/2017   • Chronic prescription benzodiazepine use 04/12/2017   • Hypokalemia 03/23/2017   • GERD (gastroesophageal reflux disease) 01/04/2017   • Other allergic rhinitis 07/22/2016   • Chronic pain syndrome 06/15/2016   • Immunocompromised state ([facility])  "11/14/2015   • Splenic lesion 11/14/2015   • Mild mitral regurgitation 07/14/2015   • Splenomegaly 07/14/2015   • Pure hypercholesterolemia 12/09/2014   • Chronic respiratory failure with hypoxia (HCC) 11/13/2014   • Vitamin D deficiency 08/26/2014   • Ostium secundum type atrial septal defect, S/P percutaneous closure 03/17/2014   • Mild aortic regurgitation and aortic valve sclerosis 03/17/2014   • Hepatopulmonary syndrome (HCC) 03/05/2014   • Sarcoidosis (HCC) 11/14/2013   • Interstitial lung disease (HCC) 11/14/2013   • MGUS (monoclonal gammopathy of unknown significance) 11/14/2013   • DUC (generalized anxiety disorder) 11/04/2013   • Primary insomnia 11/04/2013   • H/O non-ST elevation myocardial infarction (NSTEMI) 05/16/2013   • History of pancreatitis 06/20/2012   • Status post liver transplant Dr. [place] ([facility]) 03/13/2012   • Acquired hypothyroidism 03/13/2012   • Primary pulmonary hypertension (HCC) 01/13/2012       ROS   No fever or chills.  No nausea or vomiting.  No chest pain or palpitations.  No cough or SOB.  No pain with urination or hematuria.  No black or bloody stools.       Objective:     /72 (BP Location: Right arm, Patient Position: Sitting, BP Cuff Size: Adult)   Pulse 62   Temp 36.8 °C (98.3 °F)   Resp 12   Ht 1.753 m (5' 9\")   Wt 71.2 kg (157 lb)   SpO2 96%  Body mass index is 23.18 kg/m².   Physical Exam:  Frail, thin female.  Alert, oriented in mild acute distress.  Eye contact is good, speech goal directed, affect calm  Eyes: conjunctiva non-injected, sclera non-icteric.  PERRL.  EOMs intact.  No lateral nystagmus  Neck Supple.  No adenopathy or masses in the neck or supraclavicular regions. No thyromegaly  Lungs: clear to auscultation bilaterally with fair excursion. No wheezes or rhonchi  CV: regular rate and rhythm.  2/6 systolic ejection murmur          Assessment and Plan:   The following treatment plan was discussed    1. Dizziness  Discussed that we have to " balance hydration with her cardiac status but I do not think that she is drinking enough.  We also discussed safety of transfer and transition of positions.  Stressed the importance of using her walker at all times.  I have also asked her to follow-up with the neurosurgeon to see how much longer she needs to be on the medications    2. Adrenal insufficiency (HCC)  Follow-up with endocrinology for further evaluation and treatment    3. H/O non-ST elevation myocardial infarction (NSTEMI)  Follow-up with cardiology for further evaluation and treatment    4. Aortic valve insufficiency, etiology of cardiac valve disease unspecified  Follow-up with cardiology for further evaluation and treatment    5. Peripheral edema  I have asked her to call cardiology as she most likely needs to increase her diuretics    6. Need for vaccination  Updated  - Shingles Vaccine (Shingrix)    7. Encounter for screening mammogram for breast cancer  Schedule mammogram  - MA-SCREENING MAMMO BILAT W/TOMOSYNTHESIS W/CAD; Future    8. Seizures (HCC)  Continue Keppra and Letairiss.  Of asked her to call the neurosurgeon to see how much longer she needs to be on the medications    9. Traumatic subdural hematoma without loss of consciousness, initial encounter (HCC)  Continue Keppra and Letairiss.  Of asked her to call the neurosurgeon to see how much longer she needs to be on the medications    Any change or worsening of signs or symptoms, patient encouraged to follow-up or report to the emergency room for further evaluation. Patient understands and agrees.    Followup: Return in about 3 months (around 2/8/2022).

## 2021-11-18 NOTE — ASSESSMENT & PLAN NOTE
Allergist recently changed her steroid medications and increased it because of her illness.  She is not feeling very good on it.

## 2021-11-18 NOTE — ASSESSMENT & PLAN NOTE
Patient needs refills of her medications.  She has not had any further seizures.  She has not spoken to the neurosurgeon to see how long he would like her to be on this.

## 2021-11-29 NOTE — PROGRESS NOTES
Amanda Cerda, Med Ass't         11/29/21 11:11 AM  Note  RO     Pts  Otis called stating Pt needs a new prescription of ambrisentan (LETAIRIS) 10 MG tablet sent to [facility].     Thank you

## 2021-11-29 NOTE — TELEPHONE ENCOUNTER
RO    Pts  Otis called stating Pt needs a new prescription of ambrisentan (LETAIRIS) 10 MG tablet sent to [facility].    Thank you

## 2021-11-29 NOTE — TELEPHONE ENCOUNTER
1. Caller Name: Kathi Johansson                        Call Back Number: [phone]      How would the patient prefer to be contacted with a response: Phone call do NOT leave a detailed message    Patient's  called asking for a refill of medications.     Received request via: Patient    Was the patient seen in the last year in this department? Yes  LOV 11/08/2021  Does the patient have an active prescription (recently filled or refills available) for medication(s) requested? No

## 2021-12-01 NOTE — TELEPHONE ENCOUNTER
1. Caller Name: David Johansson                     Call Back Number: [phone]      How would the patient prefer to be contacted with a response: Phone call do NOT leave a detailed message    Nelson called and stated that the bumps on Roxana's arms are getting bigger and he doesn't know what to do. Should she see a specialists or do something else?     Should patient be scheduled for an appointment?

## 2021-12-06 NOTE — TELEPHONE ENCOUNTER
1. Caller Name: Otis Johansson                        Call Back Number: [phone]      How would the patient prefer to be contacted with a response: Phone call do NOT leave a detailed message    Roxana's  called requesting to have the gabapentin sent in for taking the medication BID rather than just daily.     Received request via: Patient    Was the patient seen in the last year in this department? Yes  LOV 11/08/2021  Does the patient have an active prescription (recently filled or refills available) for medication(s) requested? No

## 2021-12-10 NOTE — TELEPHONE ENCOUNTER
ESTABLISHED PATIENT PRE-VISIT PLANNING     Patient was NOT contacted to complete PVP.     Note: Patient will not be contacted if there is no indication to call.     1.  Reviewed notes from the last few office visits within the medical group: Yes    2.  If any orders were placed at last visit or intended to be done for this visit (i.e. 6 mos follow-up), do we have Results/Consult Notes?         •  Labs - Labs were not ordered at last office visit.  Note: If patient appointment is for lab review and patient did not complete labs, check with provider if OK to reschedule patient until labs completed.       •  Imaging - Imaging ordered, NOT completed. Patient advised to complete prior to next appointment. scheduled        •  Referrals - No referrals were ordered at last office visit.    3. Is this appointment scheduled as a Hospital Follow-Up? Yes, visit was at [facility].     4.  Immunizations were updated in Epic using Reconcile Outside Information activity? Yes    5.  Patient is due for the following Health Maintenance Topics:   Health Maintenance Due   Topic Date Due   • Annual Wellness Visit  Never done   • IMM ZOSTER VACCINES (2 of 2) 01/29/2019   • MAMMOGRAM  11/19/2020         6.  AHA (Pulse8) form printed for Provider? No, patient does not have any open alerts

## 2021-12-15 NOTE — TELEPHONE ENCOUNTER
Received request via: Patient    Was the patient seen in the last year in this department? Yes  LOV 11/08/2021  Does the patient have an active prescription (recently filled or refills available) for medication(s) requested? No

## 2021-12-19 NOTE — TELEPHONE ENCOUNTER
Critical potassium level noted .Spoke with patient's , KCL prescribed 40 meq BID x 2 days with repeat K in 4 days.

## 2021-12-20 NOTE — TELEPHONE ENCOUNTER
RO    Patient , Mk, called to advise the Pt got lab work done on Saturday they received a call from [facility] stating the PT has low Potasium. Liver transplant, They were advised to cut back on medication and needs a lab order in 3 days. Please call them back at [phone].    Thank you,  Sherri PEMBERTON

## 2021-12-20 NOTE — TELEPHONE ENCOUNTER
KURTIS Corona         12/18/21 4:51 PM  Note  Critical potassium level noted .Spoke with patient's , KCL prescribed 40 meq BID x 2 days with repeat K in 4 days.        D/W RO, he ordered Potassium 20meQ BID with a repeat lab work in 7 days.     LVM for

## 2021-12-20 NOTE — TELEPHONE ENCOUNTER
[place] s/W  over weekend Sunday, Florinef, and lasix. Pt did not receive lasix today but he states her ankles are swollen,     Advised it was probably better to stay on lasix and start the potassium supplement. Advised him to speak to Endocrinology regarding Dianninef.     Agreed to do lab work on Monday since this holiday is this weekend.

## 2021-12-23 NOTE — TELEPHONE ENCOUNTER
Patient  called to inform Dr. Stone that the Vimpat is getting a little bit pricey even with the insurance. Wanted an advice from Dr. Stone on what to do? Please advise. Thank you.MIHAELA Fitch.

## 2021-12-23 NOTE — TELEPHONE ENCOUNTER
LUTHER    Pt's  called and said that her potassium levels were still low and was asking if was something to be concerned with. He wanted a call back to discuss.  Ph#[phone]    Thank you

## 2021-12-23 NOTE — TELEPHONE ENCOUNTER
S/W Otis, aware that two days was not enough time to determine if supplement is enough. Reordered lab work to complete next week.

## 2021-12-25 PROBLEM — E87.70 VOLUME OVERLOAD: Status: ACTIVE | Noted: 2021-01-01

## 2021-12-26 PROBLEM — L03.115 CELLULITIS OF RIGHT LOWER EXTREMITY: Status: ACTIVE | Noted: 2021-01-01

## 2021-12-26 PROBLEM — F33.40 RECURRENT MAJOR DEPRESSIVE DISORDER, IN REMISSION (HCC): Status: ACTIVE | Noted: 2017-07-14
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
## 2021-12-26 NOTE — ED TRIAGE NOTES
"Presents with a hx of COPD, generally on 3 liters of chronic O2 supplementation  via NC.  For the past 2 days or so her oxygen requirements have increased to 5 L.  She C/O exertional dyspnea, and central chest pain.  Chief Complaint   Patient presents with   • Shortness of Breath   • Chest Pain     /71   Pulse 88   Temp 36.8 °C (98.3 °F) (Temporal)   Resp 16   Ht 1.727 m (5' 8\")   Wt 71 kg (156 lb 8.4 oz)   LMP 01/03/2000   SpO2 98% Comment: On chronic O2 supplementation  BMI 23.80 kg/m²   Has this patient been vaccinated for COVID YES  If not, would they like to be vaccinated while in the ER if eligible?  N/A  Would the patient like to speak with the ERP about the possibility of receiving the COVID vaccine today before making a decision? N/A     "

## 2021-12-26 NOTE — ASSESSMENT & PLAN NOTE
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 Follows with neurology  Continue lacosamide and levetiracetam

## 2021-12-26 NOTE — CARE PLAN
The patient is Watcher - Medium risk of patient condition declining or worsening    Shift Goals  Clinical Goals: Improve work of breathing   Patient Goals: Manage BLE edema       Problem: Pain - Standard  Goal: Alleviation of pain or a reduction in pain to the patient’s comfort goal  Outcome: Progressing   12/26/2021 0153 by Juanita Chanel R.N.  Note: Pt denied any pain upon assessment  Educated pt to report to the nurse if experiencing any pain for appropriate treatment; pt verbalized understanding.     Problem: Knowledge Deficit - Standard  Goal: Patient and family/care givers will demonstrate understanding of plan of care, disease process/condition, diagnostic tests and medications  12/26/2021 0155 by Juanita Chanel, R.N.  Note: Plan to have Echo  US-lower extremities is negative for DVT

## 2021-12-26 NOTE — PROGRESS NOTES
Received report from PENNY Grant.  Assumed Pt. Care  Pt. A&Ox4  No sign of cardiac and respiratory distress.  Pain is 8/10 on her abdomen, pain medication is given per order.  Pt. Is in bed resting.  Bed at lowest position.  Call light and personal belonging within reach.   Encourage to call for assistance.

## 2021-12-26 NOTE — H&P
Hospital Medicine History & Physical Note    Date of Service  12/25/2021    Primary Care Physician  Elisa Phillip M.D.    Code Status  Full Code    Chief Complaint  Chief Complaint   Patient presents with   • Shortness of Breath   • Chest Pain       History of Presenting Illness  Roxana Cuba is a 61 y.o. female who presented 12/25/2021 with SOB. PMH of sarcoidosis, hepatopulmonary hypertension, s/p ASD repair, chronic hypoxia on 3 L O2, cirrhosis s/p liver transplant 2011 on immunosuppressants, pancytopenia, hypothyroidism, MGUS, depression, recent subdural hematoma secondary to TBI causing seizures, dyslipidemia, GERD, adrenal insufficiency, chronic pain, history of gastric sleeve surgery with recurrent aspiration and repeat surgery 2018.     She comes in complaining of shortness of breath that began about 2 days ago but worsened earlier tonight as well as palpitations and anxiety. She had to increase her oxygen from her baseline 3 L to 5 L. She also has been having an increased dry cough, no hemoptysis.  She also has bilateral lower extremity edema that has been going on for about 1 week. She also has a wound on her right lower extremity with purulent discharge about 3 days ago.  She has been having some chills and feeling hot, measured temperature at 100.2. She denies chest pain, bowel or urinary changes.    In the ED vitals are unremarkable.  Labs show WBC 2.3, hemoglobin 8.8, platelets 90. Troponin 25, BNP 1300, D-dimer 1.3.  EKG unremarkable for acute ischemia, no evidence of right heart strain.  CXR shows mild pulmonary edema and bilateral pleural effusions that are similar slightly increased compared to prior.  US lower extremity showed no DVT.    I discussed the plan of care with patient and bedside RN.    Review of Systems  Review of Systems   Constitutional: Positive for chills. Negative for fever.   HENT: Negative for sore throat.    Respiratory: Positive for cough and shortness of breath. Negative  for sputum production.    Cardiovascular: Positive for leg swelling. Negative for chest pain.   Gastrointestinal: Negative for abdominal pain, diarrhea, nausea and vomiting.   Genitourinary: Negative for dysuria and urgency.   Skin:        RLE wound with discharge    Neurological: Negative for dizziness and headaches.   Psychiatric/Behavioral: The patient is nervous/anxious.    All other systems reviewed and are negative.      Past Medical History   has a past medical history of Anemia, Anesthesia, Breath shortness, Bronchitis ( ), Cardiomegaly, Chronic fatigue and malaise, Cirrhosis ([facility]) (December 2011), CKD (chronic kidney disease) stage 3, GFR 30-59 ml/min ([facility]), Diabetes ([facility]), Fracture of left foot, GERD (gastroesophageal reflux disease), H/O Clostridium difficile infection (02-17-17), Hemorrhagic disorder ([facility]), Hiatus hernia syndrome, High cholesterol, Hypothyroid, Jaundice (2009), Liver transplanted ([facility]), Low back pain (02-17-17), Mild aortic regurgitation and aortic valve sclerosis ( ), On home oxygen therapy, Platelet disorder ([facility]), Pneumonia, Psychiatric disorder, Pulmonary hypertension ([facility]), S/P cholecystectomy, Small bowel obstruction ([facility]), Splenomegaly, and VRE (vancomycin-resistant Enterococci).    Surgical History   has a past surgical history that includes pr anesth,nose,sinus surgery; makayla by laparoscopy (9/19/2009); exam under anesthesia (11/11/2009); hysterectomy, total abdominal; gastroscopy-endo (3/12/2010); bronchoscopy-endo (5/29/2012); bronchoscopy-endo (6/19/2012); sigmoidoscopy flex (9/26/2012); recovery (2/27/2013); bronchoscopy-endo (11/15/2013); recovery (1/21/2014); recovery (3/24/2014); hemorrhoidectomy (11/11/2009); gastroscopy (9/28/2012); carpal tunnel release; bone marrow aspiration (12/31/2012); bone marrow biopsy, ndl/trocar (12/31/2012); recovery (3/31/2014); other abdominal surgery (December 2011); bone marrow aspiration (3/16/2015); bone marrow biopsy, ndl/trocar  (3/16/2015); lung biopsy open (11/2016); tonsillectomy; other abdominal surgery (); breast biopsy (Left, 2/21/2017); colonoscopy (N/A, 3/27/2017); gastroscopy (N/A, 3/27/2017); gastric bypass laparoscopic; hernia repair; makayla by laparoscopy; other; other (12/11/2017); other; turbinate reduction (Bilateral, 10/4/2018); nasal reconstruction (Bilateral, 10/4/2018); ventral hernia repair robotic xi (N/A, 11/12/2018); and craniotomy (Left, 8/4/2021).     Family History  family history includes Alcohol/Drug in her maternal grandfather, maternal grandmother, and mother; Cancer in her paternal aunt; Diabetes in her father; Heart Disease in her father; Hyperlipidemia in her father and mother; Hypertension in her father; Non-contributory in her mother; Other in her father; Stroke in her father.   Family history reviewed with patient. There is no family history that is pertinent to the chief complaint.     Social History   reports that she has never smoked. She has never used smokeless tobacco. She reports previous alcohol use. She reports that she does not use drugs.    Allergies  Allergies   Allergen Reactions   • Rhubarb Anaphylaxis   • Organic Nitrates Unspecified     Nitroglycerin products should be avoided with the use of PDE-5 inhibitors as the combination can result in severe hypotension.  RD clarified with pt: Nitrates in food are okay and pt does not want nitrates to be listed as food allergy. Pt only avoids medications with nitrates.    • Other Drug      Any medication that may interact with cyclosporine, tacrolimus, sirolimus, or prograf (due to hx of liver transplant)    • Vancomycin Hcl Unspecified     Causes red man syndrome when infused to fast, ok if slowed infusion   • Nsaids Unspecified     Can not take due to hx of liver transplant        Medications  Prior to Admission Medications   Prescriptions Last Dose Informant Patient Reported? Taking?   Home Care Oxygen 12/25/2021 at Unknown time  Yes No    Sig: Inhale 3 L/min continuous. Oxygen dose range: 3 L/min  Respiratory route via: Nasal Cannula   Oxygen supplier: Preferred       Naloxegol Oxalate (MOVANTIK) 25 MG Tab Not Taking at Unknown time Significant Other Yes No   Sig: Take 25 mg by mouth every day.   Patient not taking: Reported on 12/25/2021   Tadalafil, PAH, 20 MG Tab   Yes No   Sig: Take 20 mg by mouth every day.   acetaminophen (TYLENOL) 500 MG Tab Not Taking at Unknown time Significant Other Yes No   Sig: Take 500 mg by mouth 1 time a day as needed for Fever.   Patient not taking: Reported on 12/25/2021   ambrisentan (LETAIRIS) 10 MG tablet 12/25/2021 at am  No No   Sig: TAKE 1 TABLET BY MOUTH EVERY DAY   docusate sodium (COLACE) 100 MG Cap Not Taking at Unknown time  Yes No   Sig: Take 100 mg by mouth 1 time a day as needed for Constipation.   Patient not taking: Reported on 12/25/2021   fludrocortisone (FLORINEF) 0.1 MG Tab 12/25/2021 at am  No No   Sig: Take 2 Tablets by mouth every day. Indications: addisons disease   furosemide (LASIX) 20 MG Tab 12/25/2021 at am  No No   Sig: TAKE 1 TABLET BY MOUTH EVERY DAY. INDICATIONS: EDEMA   gabapentin (NEURONTIN) 100 MG Cap 12/25/2021 at am  No No   Sig: Take 1 Capsule by mouth 2 times a day.   lacosamide (VIMPAT) 100 MG Tab tablet 12/25/2021 at am  No No   Sig: Take 1 Tablet by mouth 2 times a day for 90 days.   levETIRAcetam (KEPPRA) 750 MG tablet 12/25/2021 at am  No No   Sig: Take 1 Tablet by mouth 2 times a day.   levothyroxine (SYNTHROID) 137 MCG Tab 12/25/2021 at am  No No   Sig: Take 1 Tablet by mouth every morning on an empty stomach.   mycophenolate (CELLCEPT) 250 MG Cap 12/25/2021 at am  Yes No   Sig: Take 250 mg by mouth 2 times a day. Indications: Liver Transplant Recipient   omeprazole (PRILOSEC) 40 MG delayed-release capsule 12/25/2021 at am  No No   Sig: Take 1 Capsule by mouth every day.   ondansetron (ZOFRAN ODT) 4 MG TABLET DISPERSIBLE prn  No No   Sig: Take 1 Tablet by mouth every 8  hours as needed for Nausea.   oxyCODONE immediate release (ROXICODONE) 10 MG immediate release tablet 12/25/2021 at am Significant Other Yes No   Sig: Take 10 mg by mouth every 6 hours as needed for Moderate Pain.   potassium chloride SA (KDUR) 20 MEQ Tab CR 12/25/2021 at am  No No   Sig: Take 1 Tablet by mouth 2 times a day.   pravastatin (PRAVACHOL) 20 MG Tab 12/25/2021 at am  No No   Sig: Take 1 Tablet by mouth every day.   prednisONE (DELTASONE) 2.5 MG Tab Not Taking at Unknown time  Yes No   Sig: Take 2.5 mg by mouth 2 times a day.   Patient not taking: Reported on 12/25/2021   prednisONE (DELTASONE) 2.5 MG Tab Not Taking at Unknown time  Yes No   Sig: Take 5 mg by mouth every day.   Patient not taking: Reported on 12/25/2021   sertraline (ZOLOFT) 100 MG Tab 12/24/2021 at pm  No No   Sig: TAKE 1 TABLET BY MOUTH EVERY DAY   tacrolimus (PROGRAF) 1 MG Cap 12/24/2021 at pm  Yes No   tadalafil (CIALIS) 20 MG tablet 12/24/2021 at pm  No No   Sig: Take 1 Tablet by mouth every day.   therapeutic multivitamin-minerals (THERAGRAN-M) Tab 12/25/2021 at am Significant Other Yes No   Sig: Take 1 tablet by mouth every day.   traZODone (DESYREL) 50 MG Tab 12/24/2021 at pm  No No   Sig: Take 1 Tablet by mouth at bedtime.      Facility-Administered Medications: None       Physical Exam  Temp:  [36.7 °C (98.1 °F)-36.8 °C (98.3 °F)] 36.7 °C (98.1 °F)  Pulse:  [86-88] 86  Resp:  [16-20] 20  BP: (139-140)/(70-71) 140/70  SpO2:  [94 %-98 %] 94 %  Blood Pressure: 139/71   Temperature: 36.8 °C (98.3 °F)   Pulse: 88   Respiration: 16   Pulse Oximetry: 98 % (On chronic O2 supplementation)       Physical Exam  Constitutional:       General: She is not in acute distress.  HENT:      Head: Normocephalic and atraumatic.      Mouth/Throat:      Mouth: Mucous membranes are moist.      Pharynx: No oropharyngeal exudate or posterior oropharyngeal erythema.   Eyes:      General: No scleral icterus.  Cardiovascular:      Rate and Rhythm: Normal  rate and regular rhythm.      Pulses: Normal pulses.      Heart sounds: Normal heart sounds. No murmur heard.      Pulmonary:      Effort: Respiratory distress present.      Breath sounds: Rales present.   Abdominal:      General: There is no distension.      Palpations: Abdomen is soft.      Tenderness: There is no abdominal tenderness.   Musculoskeletal:         General: Normal range of motion.      Cervical back: Normal range of motion and neck supple.      Comments: Bilateral lower extremity pitting edema to the knees   Skin:     General: Skin is warm and dry.      Comments: Right lower extremity wound with surrounding erythema. No purulent discharge   Neurological:      General: No focal deficit present.      Mental Status: She is alert and oriented to person, place, and time.   Psychiatric:      Comments: Anxious         Laboratory:  Recent Labs     12/25/21 1845   WBC 2.3*   RBC 3.26*   HEMOGLOBIN 8.8*   HEMATOCRIT 29.0*   MCV 89.0   MCH 27.0   MCHC 30.3*   RDW 45.8   PLATELETCT 90*   MPV 10.2     Recent Labs     12/25/21 1845   SODIUM 142   POTASSIUM 3.9   CHLORIDE 104   CO2 28   GLUCOSE 138*   BUN 9   CREATININE 0.63   CALCIUM 7.9*     Recent Labs     12/25/21  1845   ALTSGPT 27   ASTSGOT 46*   ALKPHOSPHAT 56   TBILIRUBIN 0.3   GLUCOSE 138*     Recent Labs     12/25/21  1940   APTT 29.0   INR 1.15*     Recent Labs     12/25/21 1845   NTPROBNP 1366*         Recent Labs     12/25/21 1845   TROPONINT 25*       Imaging:  US-EXTREMITY VENOUS LOWER BILAT   Final Result      No evidence of bilateral  lower extremity deep venous thrombosis.      DX-CHEST-PORTABLE (1 VIEW)   Final Result      Mild pulmonary edema with small bilateral pleural effusions and bibasilar infiltrate versus atelectasis.      EC-ECHOCARDIOGRAM COMPLETE W/O CONT    (Results Pending)       X-Ray:  I have personally reviewed the images and compared with prior images.  EKG:  I have personally reviewed the images and compared with prior  images.    Assessment/Plan:  I anticipate this patient will require at least two midnights for appropriate medical management, necessitating inpatient admission.    * Volume overload- (present on admission)  Assessment & Plan  Lower extremity edema and slightly worsened pulmonary edema and pleural effusions on CXR  BNP elevated at 1300  Echocardiogram 08/2021 showed EF of 65%, normal RV function, moderate AR, mild MR, RVSP at 35  Bilateral lower extremities with no DVT  History of pulmonary hypertension and says she is been compliant with medication  History of cirrhosis, s/p transplant 2011, compliant with immunotherapy    On labs kidney function normal. AST/ALT slightly elevated  Symptoms may be due to pulmonary hypertension vs worsening AR vs CHF  Echocardiogram ordered    Primary pulmonary hypertension (HCC)- (present on admission)  Assessment & Plan  Pulmonary hypertension with history of sarcoidosis, interstitial lung disease, chronic hypoxia on 2 L O2  Also has history of hepatopulmonary syndrome  Continue tadalafil and ambrisentan  Possible cause of symptoms, see above    Acute on chronic respiratory failure with hypoxia (HCC)- (present on admission)  Assessment & Plan  On 3 L O2 chronically, has needed to increase it to 5 over the past few days.  Requiring 4 L on my evaluation  On exam she is volume overloaded, CXR shows mild pulmonary edema and small bilateral pleural effusions  Temperature at home 100.2, afebrile in the ED  History of pancytopenia, stable.  Patient on immunosuppressive therapy for liver transplant  Ultrasound lower extremity negative for DVT    Symptoms most likely due to volume overload, see above for details  Doubt pulmonary infection.  Flu, RSV, Covid negative.  No conor consolidation on CXR.  Elevated temperature likely due to cellulitis  She will be on Bactrim for cellulitis  PE possible with D-dimer 1.3, unable to get larger IV in the ED for contrast for CTA chest.  Ultrasound lower  extremity negative for DVTs bilaterally. No evidence of right heart strain on EKG.  Echocardiogram pending  Attempt IV line with ultrasound, can consider CTA after PIV established if suspicion is still high    Cellulitis of right lower extremity- (present on admission)  Assessment & Plan  Small wound with surrounding erythema on the right lower extremity. She says she noticed purulent discharge a few days ago  Bactrim for 5 days    Pancytopenia (HCC)- (present on admission)  Assessment & Plan  Labs at around baseline  Per oncology note 09/2021 likely medication related. She is on tacrolimus and mycophenolate  Afebrile, continue to monitor    Adrenal insufficiency (HCC)- (present on admission)  Assessment & Plan  Continue fludrocortisone    Hepatopulmonary syndrome (HCC)- (present on admission)  Assessment & Plan  History of cirrhosis s/p liver transplant 2011  Has pulmonary hypertension. S/p ASD repair    Sarcoidosis (HCC)- (present on admission)  Assessment & Plan  History of biopsy-proven sarcoidosis, on chronic oxygen    Seizures (HCC)- (present on admission)  Assessment & Plan  Seizures following TBI resulting in subdural hematoma. Follows with neurology  Continue lacosamide and levetiracetam    Recurrent major depressive disorder, in remission (HCC)- (present on admission)  Assessment & Plan  Continue sertraline    GERD (gastroesophageal reflux disease)- (present on admission)  Assessment & Plan  Continue omeprazole    Chronic pain syndrome- (present on admission)  Assessment & Plan  Continue as needed narcotics    Acquired hypothyroidism- (present on admission)  Assessment & Plan  Continue levothyroxine  Repeat thyroid labs      VTE prophylaxis: enoxaparin ppx

## 2021-12-26 NOTE — PROGRESS NOTES
4 Eyes Skin Assessment Completed by PENNY Grant and PENNY Bailey.    Head WDL  Ears WDL  Nose WDL  Mouth WDL  Neck WDL  Breast/Chest WDL  Shoulder Blades WDL  Spine WDL  (R) Arm/Elbow/Hand Bruising  (L) Arm/Elbow/Hand WDL  Abdomen WDL  Groin WDL  Scrotum/Coccyx/Buttocks WDL  (R) Leg Redness, Swelling, Abrasion and Edema  (L) Leg Swelling, edema  (R) Heel/Foot/Toe WDL  (L) Heel/Foot/Toe WDL      Devices In Places Nasal Cannula      Interventions In Place N/A    Possible Skin Injury No    Pictures Uploaded Into Epic Yes  Wound Consult Placed N/A  RN Wound Prevention Protocol Ordered No

## 2021-12-26 NOTE — ASSESSMENT & PLAN NOTE
Pulmonary hypertension with history of sarcoidosis, interstitial lung disease, chronic hypoxia on 3 L O2  Also has history of hepatopulmonary syndrome  Continue tadalafil and ambrisentan  Repeat echo and CTA pending

## 2021-12-26 NOTE — ASSESSMENT & PLAN NOTE
Labs at around baseline  Per oncology note 09/2021 likely medication related. She is on tacrolimus and mycophenolate - will check levels and iron studies  MGUS not thought to have progressed to MM   Afebrile, continue to monitor

## 2021-12-26 NOTE — PROGRESS NOTES
Hospital Medicine Daily Progress Note    Date of Service  12/26/2021    Chief Complaint  Roxana Cuba is a 61 y.o. female admitted 12/25/2021 with SOB    Hospital Course  Roxana Cuba is a 61 y.o. female who presented 12/25/2021 with SOB. PMH of sarcoidosis, hepatopulmonary hypertension, s/p ASD repair, chronic hypoxia on 3 L O2, cirrhosis s/p liver transplant 2011 on immunosuppressants, pancytopenia, hypothyroidism, MGUS, depression, recent subdural hematoma secondary to TBI causing seizures, dyslipidemia, GERD, adrenal insufficiency, chronic pain, history of gastric sleeve surgery with recurrent aspiration and repeat surgery 2018.      She comes in complaining of shortness of breath that began about 2 days ago but worsened earlier tonight as well as palpitations and anxiety. She had to increase her oxygen from her baseline 3 L to 5 L. She also has been having an increased dry cough, no hemoptysis.  She also has bilateral lower extremity edema that has been going on for about 1 week. She also has a wound on her right lower extremity with purulent discharge about 3 days ago.  She has been having some chills and feeling hot, measured temperature at 100.2. She denies chest pain, bowel or urinary changes.    Interval Problem Update  12/26 - Patient feels better than she did on admit, still some SOB. States she was febrile at home, but while her wound looks good today, it was just redressed - she states she had purulence from the wound at home. Afebrile here, no tachycardia, is on 4L now, is on 3L chronically due to sarcoid.     I have personally seen and examined the patient at bedside. I discussed the plan of care with patient and bedside RN.    Consultants/Specialty  none    Code Status  Full Code    Disposition  Patient is not medically cleared for discharge.   Anticipate discharge to to home with organized home healthcare and close outpatient follow-up.  I have placed the appropriate orders for  post-discharge needs.    Review of Systems  Review of Systems   Constitutional: Positive for chills and fever.   Respiratory: Positive for cough and shortness of breath. Negative for sputum production and wheezing.    Cardiovascular: Positive for leg swelling. Negative for chest pain.   Gastrointestinal: Negative for abdominal pain, nausea and vomiting.   Genitourinary: Negative for dysuria.   Musculoskeletal: Negative for myalgias.   Skin:        Nickel sized skin lesion on R shin     Neurological: Negative for dizziness.   All other systems reviewed and are negative.       Physical Exam  Temp:  [36.7 °C (98.1 °F)-36.9 °C (98.4 °F)] 36.8 °C (98.2 °F)  Pulse:  [81-88] 87  Resp:  [16-20] 17  BP: (133-140)/(67-77) 137/77  SpO2:  [93 %-98 %] 93 %    Physical Exam  Vitals and nursing note reviewed.   Constitutional:       Appearance: She is not ill-appearing or toxic-appearing.   HENT:      Head: Normocephalic and atraumatic.      Mouth/Throat:      Mouth: Mucous membranes are moist.   Eyes:      Extraocular Movements: Extraocular movements intact.   Cardiovascular:      Rate and Rhythm: Normal rate and regular rhythm.      Heart sounds: No murmur heard.      Pulmonary:      Effort: Pulmonary effort is normal. No respiratory distress.      Breath sounds: No wheezing or rhonchi.      Comments: Bilateral fine crackles  Abdominal:      General: Abdomen is flat. Bowel sounds are normal. There is no distension.      Palpations: Abdomen is soft.      Tenderness: There is no abdominal tenderness. There is no guarding or rebound.   Musculoskeletal:         General: Swelling (Trace, bilateral) present.   Skin:     General: Skin is warm and dry.      Coloration: Skin is not jaundiced.      Findings: No bruising.      Comments: Nickel sized skin wound to the R shin, no purulence, bruising present surrounding the wound but no erythema, some warmth   Neurological:      Mental Status: She is alert.         Fluids    Intake/Output  Summary (Last 24 hours) at 12/26/2021 1120  Last data filed at 12/26/2021 1000  Gross per 24 hour   Intake 240 ml   Output --   Net 240 ml       Laboratory  Recent Labs     12/25/21  1845 12/26/21  0506   WBC 2.3* 2.0*   RBC 3.26* 3.09*   HEMOGLOBIN 8.8* 8.4*   HEMATOCRIT 29.0* 27.3*   MCV 89.0 88.3   MCH 27.0 27.2   MCHC 30.3* 30.8*   RDW 45.8 46.3   PLATELETCT 90* 88*   MPV 10.2 10.4     Recent Labs     12/25/21  1845 12/26/21  0506   SODIUM 142 145   POTASSIUM 3.9 3.7   CHLORIDE 104 106   CO2 28 32   GLUCOSE 138* 89   BUN 9 8   CREATININE 0.63 0.59   CALCIUM 7.9* 8.2*     Recent Labs     12/25/21  1940   APTT 29.0   INR 1.15*               Imaging  EC-ECHOCARDIOGRAM COMPLETE W/O CONT         US-EXTREMITY VENOUS LOWER BILAT   Final Result      No evidence of bilateral  lower extremity deep venous thrombosis.      DX-CHEST-PORTABLE (1 VIEW)   Final Result      Mild pulmonary edema with small bilateral pleural effusions and bibasilar infiltrate versus atelectasis.           Assessment/Plan  * Volume overload- (present on admission)  Assessment & Plan  Lower extremity edema and slightly worsened pulmonary edema and pleural effusions on CXR, BNP elevated at 1300  Echocardiogram 08/2021 showed EF of 65%, normal RV function, moderate AR, mild MR, RVSP at 35  Symptoms may be due to pulmonary hypertension vs worsening AR vs CHF- vs sarcoid flare will give low dose IV Lasix for now and monitor  Echocardiogram ordered  Symptoms and severity of illness currently not suggestive of a sarcoid flare, especially with her being on immunosuppressants; if she fails to improve with diuresis, will ask Pulm to follow    Cellulitis of right lower extremity- (present on admission)  Assessment & Plan  Small wound with surrounding erythema on the right lower extremity. She says she noticed purulent discharge a few days ago  Will change Bactrim to Unasyn and Doxy as she already has chronic cytopenias  Will culture the wound  MRSA nares  pending, stop Doxy if negative    Seizures (HCC)- (present on admission)  Assessment & Plan  Seizures following TBI resulting in subdural hematoma. Follows with neurology  Continue lacosamide and levetiracetam    Pancytopenia (HCC)- (present on admission)  Assessment & Plan  Labs at around baseline  Per oncology note 09/2021 likely medication related. She is on tacrolimus and mycophenolate - will check levels and iron studies  MGUS not thought to have progressed to MM   Afebrile, continue to monitor    Adrenal insufficiency (HCC)- (present on admission)  Assessment & Plan  Continue fludrocortisone, no signs of decompensation that would require IV hydrocortisone    Primary pulmonary hypertension (HCC)- (present on admission)  Assessment & Plan  Pulmonary hypertension with history of sarcoidosis, interstitial lung disease, chronic hypoxia on 3 L O2  Also has history of hepatopulmonary syndrome  Continue tadalafil and ambrisentan  Repeat echo and CTA pending    Recurrent major depressive disorder, in remission (HCC)- (present on admission)  Assessment & Plan  Continue sertraline    GERD (gastroesophageal reflux disease)- (present on admission)  Assessment & Plan  Continue omeprazole    Chronic pain syndrome- (present on admission)  Assessment & Plan  Continue as needed narcotics    Acute on chronic respiratory failure with hypoxia (HCC)- (present on admission)  Assessment & Plan  - On 3 L O2 chronically, has needed to increase it to 5 over the past few days.  Requiring 4 L on my evaluation  - CXR shows mild pulmonary edema and small bilateral pleural effusions - exam with fine crackles, likely due to her sarcoid lung disease   - Temperature at home 100.2, afebrile here   - Immunosuppressed due to history of liver transplant  - Ultrasound lower extremity negative for DVT  - Symptoms most likely due to volume overload  - PE possible with D-dimer 1.3, unable to get larger IV in the ED for contrast for CTA chest.   Ultrasound lower extremity negative for DVTs bilaterally. No evidence of right heart strain on EKG.  Echocardiogram pending  - Get CTA when pt has IV access     Hepatopulmonary syndrome (HCC)- (present on admission)  Assessment & Plan  History of cirrhosis s/p liver transplant 2011  Has pulmonary hypertension. S/p ASD repair    Sarcoidosis (HCC)- (present on admission)  Assessment & Plan  History of biopsy-proven sarcoidosis, on chronic oxygen    Acquired hypothyroidism- (present on admission)  Assessment & Plan  Continue levothyroxine  TSH normal here        VTE prophylaxis: enoxaparin ppx    I have performed a physical exam and reviewed and updated ROS and Plan today (12/26/2021). In review of yesterday's note (12/25/2021), there are no changes except as documented above.

## 2021-12-26 NOTE — ED PROVIDER NOTES
"ED Provider Note     Scribed for Hanane Hernandez D.O. by Vanesa Monterroso. 12/25/2021, 6:33 PM.     Primary care provider: Elisa Phillip M.D.  Means of arrival: walk-in         History obtained from: patient  History limited by: none    CHIEF COMPLAINT  Chief Complaint   Patient presents with   • Shortness of Breath   • Chest Pain       HPI  Roxana Cuba is a 61 y.o. female who has a history of COPD and Sarcoidosis presents to the emergency Department for evaluation of shortness of breath onset tonight. Roxana Ingram was sitting down when she suddenly had heart palpitations. She tried to lay down but she had shortness of breath and chest pain. She notes that she is normally on 3 L of oxygen but needed to increase her oxygen to 5 L this week. When she increased her oxygen she noticed bilateral edema in her ankles. Right now she has bilateral pedal and leg edema. She has had a fever around 100.2 °F. She denies nausea, vomiting, diarrhea, diaphoresis. No alleviating or exacerbating factors were reported. She has a wound on her right leg that was oozing pus a few days ago. A few months ago had a subdural hematoma and had surgery preformed. Lately she has been having low potassium. Ten years ago today she had a liver transplant. She has a history of diabetes, high cholesterol and cirrhosis.    REVIEW OF SYSTEMS  Pertinent positives include shortness of breath, chest pain, heart palpitations, bilateral leg edema, and fever . Pertinent negatives include no nausea, vomiting, diarrhea, diaphoresis.   See HPI for further details. All other systems are negative.    PAST MEDICAL HISTORY  Past Medical History:   Diagnosis Date   • Anemia    • Anesthesia     \"takes more to get me under, would rather be intubated\"    • Breath shortness     cont oxygen 5L (Preffered)   • Bronchitis      2016   • Cardiomegaly    • Chronic fatigue and malaise    • Cirrhosis (HCC) December 2011    [place] [facility] [place]   • [place] " "(chronic kidney disease) stage 3, GFR 30-59 ml/min ([facility])    • Diabetes ([facility])     reports hx of, resolved w/weight loss. Reports still checking bloodsugars twice daily and using insulin PRN   • Fracture of left foot    • GERD (gastroesophageal reflux disease)         • H/O Clostridium difficile infection 02-17-17    reports \"16 months ago\". Current stool sample 01-26-17 neg   • Hemorrhagic disorder ([facility])     \"low platelets\" bruises easily    • Hiatus hernia syndrome    • High cholesterol    • Hypothyroid    • Jaundice 2009   • Liver transplanted ([facility])    • Low back pain 02-17-17    and left foot, 7/10   • Mild aortic regurgitation and aortic valve sclerosis     • On home oxygen therapy     5 liters, Dr. Gaming   • Platelet disorder ([facility])     low platelets   • Pneumonia     aspiration,    • Psychiatric disorder     Mood disorder   • Pulmonary hypertension ([facility])        • S/P cholecystectomy    • Small bowel obstruction ([facility])     01-   • Splenomegaly    • VRE (vancomycin-resistant Enterococci)     02-17-17, pt declines knowledge of this       FAMILY HISTORY  Family History   Problem Relation Age of Onset   • Other Father         Unknown (dead 10 years)   • Diabetes Father    • Heart Disease Father    • Hypertension Father    • Hyperlipidemia Father    • Stroke Father    • Non-contributory Mother    • Hyperlipidemia Mother    • Alcohol/Drug Mother    • Cancer Paternal Aunt    • Alcohol/Drug Maternal Grandmother    • Alcohol/Drug Maternal Grandfather        SOCIAL HISTORY  Social History     Tobacco Use   • Smoking status: Never Smoker   • Smokeless tobacco: Never Used   • Tobacco comment: avoid all tobacco products   Vaping Use   • Vaping Use: Never used   Substance Use Topics   • Alcohol use: Not Currently     Alcohol/week: 0.0 oz   • Drug use: No      Social History     Substance and Sexual Activity   Drug Use No       SURGICAL HISTORY  Past Surgical History:   Procedure Laterality Date   • " CRANIOTOMY Left 8/4/2021    Procedure: CRANIOTOMY;  Surgeon: Tramaine Arnold III, M.D.;  [facility] SURGERY [facility];  Service: Neurosurgery   • VENTRAL HERNIA REPAIR ROBOTIC XI N/A 11/12/2018    Procedure: VENTRAL HERNIA REPAIR ROBOTIC XI- FOR EPIGASTRIC INCISIONAL;  Surgeon: John H Ganser, M.D.;  Location: SURGERY [facility];  Service: General   • TURBINATE REDUCTION Bilateral 10/4/2018    Procedure: TURBINATE REDUCTION;  Surgeon: Silviano Erazo M.D.;  Location: SURGERY SAME DAY [facility];  Service: Ent   • NASAL RECONSTRUCTION Bilateral 10/4/2018    Procedure: NASAL RECONSTRUCTION- LATERA IMPLANTS;  Surgeon: Silviano Erazo M.D.;  Location: SURGERY SAME DAY [facility];  Service: Ent   • OTHER  12/11/2017    paniculectomy   • COLONOSCOPY N/A 3/27/2017    Procedure: COLONOSCOPY;  Surgeon: Osman Matt M.D.;  Location: SURGERY SAME DAY [facility];  Service:    • GASTROSCOPY N/A 3/27/2017    Procedure: GASTROSCOPY;  Surgeon: Osman Matt M.D.;  Location: SURGERY SAME DAY [facility];  Service:    • BREAST BIOPSY Left 2/21/2017    Procedure: BREAST BIOPSY - WIRE LOCALIZED EXCISONAL ;  Surgeon: Jane Zhao M.D.;  Location: SURGERY SAME DAY [facility];  Service:    • LUNG BIOPSY OPEN  11/2016   • OTHER ABDOMINAL SURGERY      Gasric Sleeve   • BONE MARROW ASPIRATION  3/16/2015    Performed by Freddie Hi M.D. at ENDOSCOPY [facility]   • BONE MARROW BIOPSY, NDL/TROCAR  3/16/2015    Performed by Freddie Hi M.D. at ENDOSCOPY [facility]   • RECOVERY  3/31/2014    Performed by [facility] Surgery at SURGERY [facility]   • RECOVERY  3/24/2014    Performed by [facility]overy Surgery at SURGERY SAME DAY [facility]   • RECOVERY  1/21/2014    Performed by [facility] at SURGERY SAME DAY [facility]   • BRONCHOSCOPY-ENDO  11/15/2013    Performed by Ha Perez M.D. at ENDOSCOPY [facility]   • RECOVERY  2/27/2013    Performed by [facility] Surgery at  SURGERY SAME DAY [facility]   • BONE MARROW ASPIRATION  12/31/2012    Performed by Josemanuel Real M.D. at ENDOSCOPY [facility]   • BONE MARROW BIOPSY, NDL/TROCAR  12/31/2012    Performed by Josemanuel Real M.D. at ENDOSCOPY [facility]   • GASTROSCOPY  9/28/2012    Performed by Aravind Richards M.D. at SURGERY [facility]   • SIGMOIDOSCOPY FLEX  9/26/2012    Performed by Kristopher Cardoso M.D. at ENDOSCOPY [facility]   • BRONCHOSCOPY-ENDO  6/19/2012    Performed by MARLENA MURILLO at ENDOSCOPY [facility]   • BRONCHOSCOPY-ENDO  5/29/2012    Performed by SUYAPA CAMARENA at ENDOSCOPY [facility]   • OTHER ABDOMINAL SURGERY  December 2011    [place] [facility] [place]   • GASTROSCOPY-ENDO  3/12/2010    Performed by CAMELIA SAMANO at ENDOSCOPY [facility]   • EXAM UNDER ANESTHESIA  11/11/2009    Performed by BIRD ABDI at SURGERY [facility]   • HEMORRHOIDECTOMY  11/11/2009    Performed by BIRD ABDI at SURGERY [facility]   • KELBY BY LAPAROSCOPY  9/19/2009    Performed by BIRD ABDI at SURGERY [facility]   • CARPAL TUNNEL RELEASE          • KELBY BY LAPAROSCOPY     • GASTRIC BYPASS LAPAROSCOPIC     • HERNIA REPAIR      x3   • HYSTERECTOMY, TOTAL ABDOMINAL          • OTHER      Breast reduction   • OTHER      liver transplant   • PB ANESTH,NOSE,SINUS SURGERY      x4   • TONSILLECTOMY         CURRENT MEDICATIONS    Current Facility-Administered Medications:   •  [START ON 12/26/2021] aspirin (ASA) chewable tab 324 mg, 324 mg, Oral, DAILY, Hanane Hernandez D.O.  •  polyethylene glycol/lytes (MIRALAX) PACKET 1 Packet, 1 Packet, Oral, QDAY PRN **AND** magnesium hydroxide (MILK OF MAGNESIA) suspension 30 mL, 30 mL, Oral, QDAY PRN **AND** bisacodyl (DULCOLAX) suppository 10 mg, 10 mg, Rectal, QDAY PRN, Krystle Merino M.D.  •  [START ON 12/26/2021] enoxaparin (LOVENOX) inj 40 mg, 40 mg, Subcutaneous, DAILY, Krystle Merino M.D.  •  labetalol  (NORMODYNE/TRANDATE) injection 10 mg, 10 mg, Intravenous, Q4HRS PRN, Krystle Merino M.D.  •  [START ON 12/26/2021] ambrisentan (LETAIRIS) TABS 10 mg, 10 mg, Oral, DAILY, Krystle Merino M.D.  •  [START ON 12/26/2021] fludrocortisone (FLORINEF) tablet 0.2 mg, 0.2 mg, Oral, DAILY, Krystle Merino M.D.  •  gabapentin (NEURONTIN) capsule 100 mg, 100 mg, Oral, BID, Krystle Merino M.D.  •  lacosamide (VIMPAT) tablet 100 mg, 100 mg, Oral, BID, Krystle Merino M.D.  •  levETIRAcetam (KEPPRA) tablet 750 mg, 750 mg, Oral, BID, Krystle Merino M.D.  •  [START ON 12/26/2021] levothyroxine (SYNTHROID) tablet 137 mcg, 137 mcg, Oral, AM ES, Krystle Merino M.D.  •  mycophenolate (CELLCEPT) capsule 250 mg, 250 mg, Oral, BID, Krystle Merino M.D.  •  [START ON 12/26/2021] omeprazole (PRILOSEC) capsule 40 mg, 40 mg, Oral, DAILY, Krystle Merino M.D.  •  ondansetron (ZOFRAN ODT) dispertab 4 mg, 4 mg, Oral, Q8HRS PRN, Krystle Merino M.D.  •  oxyCODONE immediate-release (ROXICODONE) tablet 5-10 mg, 5-10 mg, Oral, Q6HRS PRN, Krystle Merino M.D.  •  [START ON 12/26/2021] pravastatin (PRAVACHOL) tablet 20 mg, 20 mg, Oral, DAILY, Krystle Merino M.D.  •  [START ON 12/26/2021] sertraline (Zoloft) tablet 100 mg, 100 mg, Oral, DAILY, Krystle Merino M.D.  •  tacrolimus (PROGRAF) capsule 1 mg, 1 mg, Oral, BID, Krystle Merino M.D.  •  [START ON 12/26/2021] Tadalafil (PAH) TABS 20 mg, 20 mg, Oral, DAILY, Krystle Merino M.D.  •  sulfamethoxazole-trimethoprim (BACTRIM DS) 800-160 MG tablet 1 Tablet, 1 Tablet, Oral, Q12HRS, Krystle Merino M.D.    Current Outpatient Medications:   •  potassium chloride SA (KDUR) 20 MEQ Tab CR, Take 1 Tablet by mouth 2 times a day., Disp: 60 Tablet, Rfl: 11  •  ambrisentan (LETAIRIS) 10 MG tablet, TAKE 1 TABLET BY MOUTH EVERY DAY, Disp: 30 Tablet, Rfl: 11  •  furosemide (LASIX) 20 MG Tab, TAKE 1 TABLET BY MOUTH EVERY DAY. INDICATIONS: EDEMA, Disp: 100 Tablet, Rfl: 3  •  gabapentin (NEURONTIN) 100 MG Cap, Take 1  Capsule by mouth 2 times a day., Disp: 180 Capsule, Rfl: 1  •  levothyroxine (SYNTHROID) 137 MCG Tab, Take 1 Tablet by mouth every morning on an empty stomach., Disp: 90 Tablet, Rfl: 1  •  fludrocortisone (FLORINEF) 0.1 MG Tab, Take 2 Tablets by mouth every day. Indications: addisons disease, Disp: 60 Tablet, Rfl: 0  •  prednisONE (DELTASONE) 2.5 MG Tab, Take 5 mg by mouth every day. (Patient not taking: Reported on 12/25/2021), Disp: , Rfl:   •  Tadalafil, PAH, 20 MG Tab, Take 20 mg by mouth every day., Disp: , Rfl:   •  tacrolimus (PROGRAF) 1 MG Cap, , Disp: , Rfl:   •  tadalafil (CIALIS) 20 MG tablet, Take 1 Tablet by mouth every day., Disp: 90 Tablet, Rfl: 3  •  lacosamide (VIMPAT) 100 MG Tab tablet, Take 1 Tablet by mouth 2 times a day for 90 days., Disp: 60 Tablet, Rfl: 2  •  prednisONE (DELTASONE) 2.5 MG Tab, Take 2.5 mg by mouth 2 times a day. (Patient not taking: Reported on 12/25/2021), Disp: , Rfl:   •  levETIRAcetam (KEPPRA) 750 MG tablet, Take 1 Tablet by mouth 2 times a day., Disp: 180 Tablet, Rfl: 2  •  omeprazole (PRILOSEC) 40 MG delayed-release capsule, Take 1 Capsule by mouth every day., Disp: 90 Capsule, Rfl: 3  •  pravastatin (PRAVACHOL) 20 MG Tab, Take 1 Tablet by mouth every day., Disp: 90 Tablet, Rfl: 3  •  sertraline (ZOLOFT) 100 MG Tab, TAKE 1 TABLET BY MOUTH EVERY DAY, Disp: 90 Tablet, Rfl: 1  •  ondansetron (ZOFRAN ODT) 4 MG TABLET DISPERSIBLE, Take 1 Tablet by mouth every 8 hours as needed for Nausea., Disp: 30 Tablet, Rfl: 2  •  traZODone (DESYREL) 50 MG Tab, Take 1 Tablet by mouth at bedtime., Disp: 901 Tablet, Rfl: 3  •  Home Care Oxygen, Inhale 3 L/min continuous. Oxygen dose range: 3 L/min Respiratory route via: Nasal Cannula  Oxygen supplier: Preferred  , Disp: , Rfl:   •  mycophenolate (CELLCEPT) 250 MG Cap, Take 250 mg by mouth 2 times a day. Indications: Liver Transplant Recipient, Disp: , Rfl:   •  docusate sodium (COLACE) 100 MG Cap, Take 100 mg by mouth 1 time a day as needed  "for Constipation. (Patient not taking: Reported on 12/25/2021), Disp: , Rfl:   •  oxyCODONE immediate release (ROXICODONE) 10 MG immediate release tablet, Take 10 mg by mouth every 6 hours as needed for Moderate Pain., Disp: , Rfl:   •  acetaminophen (TYLENOL) 500 MG Tab, Take 500 mg by mouth 1 time a day as needed for Fever. (Patient not taking: Reported on 12/25/2021), Disp: , Rfl:   •  Naloxegol Oxalate (MOVANTIK) 25 MG Tab, Take 25 mg by mouth every day. (Patient not taking: Reported on 12/25/2021), Disp: , Rfl:   •  therapeutic multivitamin-minerals (THERAGRAN-M) Tab, Take 1 tablet by mouth every day., Disp: , Rfl:     ALLERGIES  Allergies   Allergen Reactions   • Rhubarb Anaphylaxis   • Organic Nitrates Unspecified     Nitroglycerin products should be avoided with the use of PDE-5 inhibitors as the combination can result in severe hypotension.  RD clarified with pt: Nitrates in food are okay and pt does not want nitrates to be listed as food allergy. Pt only avoids medications with nitrates.    • Other Drug      Any medication that may interact with cyclosporine, tacrolimus, sirolimus, or prograf (due to hx of liver transplant)    • Vancomycin Hcl Unspecified     Causes red man syndrome when infused to fast, ok if slowed infusion   • Nsaids Unspecified     Can not take due to hx of liver transplant        PHYSICAL EXAM  VITAL SIGNS: /71   Pulse 88   Temp 36.8 °C (98.3 °F) (Temporal)   Resp 16   Ht 1.727 m (5' 8\")   Wt 71 kg (156 lb 8.4 oz)   LMP 01/03/2000   SpO2 98% Comment: On chronic O2 supplementation  BMI 23.80 kg/m²     Constitutional: Patient is an ill appearing elderly female in Moderate respiratory distress, Anxious  HENT: Normocephalic, Nose normal with no mucosal edema or drainage. Oropharynx moist without erythema or exudates.  Eyes: PERRL, EOMI, Conjunctiva without erythema  Neck: Supple   Lymphatic: No lymphadenopathy noted.   Cardiovascular: Tachycardiac and Regular rhythm. No " murmur  Thorax & Lungs: Diminished air sounds in bases. No rhonchi, wheezing or rales. No chest tenderness, rashes, lesions or signs of trauma .  Abdomen: Bowel sounds normal in all four quadrants. Soft,nontender, no rebound , guarding, palpable masses.   Skin: Pale and cool to touch  Back: No cervical, thoracic, or lumbosacral tenderness.  Extremities: 2+ bilateral pitting edema, 3 by 3 superficial wound with no active bleeding on the right anterior mid lower leg. Peripheral pulses 4/4 No tenderness  Musculoskeletal: Normal range of motion in all major joints.   Neurologic: Alert & oriented x 3, Normal motor function, Normal sensory function  Psychiatric: Anxious, Judgment normal    DIAGNOSTICS/PROCEDURES    LABS  Results for orders placed or performed during the hospital encounter of 12/25/21   CBC WITH DIFFERENTIAL   Result Value Ref Range    WBC 2.3 (LL) 4.8 - 10.8 K/uL    RBC 3.26 (L) 4.20 - 5.40 M/uL    Hemoglobin 8.8 (L) 12.0 - 16.0 g/dL    Hematocrit 29.0 (L) 37.0 - 47.0 %    MCV 89.0 81.4 - 97.8 fL    MCH 27.0 27.0 - 33.0 pg    MCHC 30.3 (L) 33.6 - 35.0 g/dL    RDW 45.8 35.9 - 50.0 fL    Platelet Count 90 (L) 164 - 446 K/uL    MPV 10.2 9.0 - 12.9 fL    Neutrophils-Polys 50.00 44.00 - 72.00 %    Lymphocytes 33.80 22.00 - 41.00 %    Monocytes 11.40 0.00 - 13.40 %    Eosinophils 4.40 0.00 - 6.90 %    Basophils 0.40 0.00 - 1.80 %    Immature Granulocytes 0.00 0.00 - 0.90 %    Nucleated RBC 0.00 /100 WBC    Neutrophils (Absolute) 1.14 (L) 2.00 - 7.15 K/uL    Lymphs (Absolute) 0.77 (L) 1.00 - 4.80 K/uL    Monos (Absolute) 0.26 0.00 - 0.85 K/uL    Eos (Absolute) 0.10 0.00 - 0.51 K/uL    Baso (Absolute) 0.01 0.00 - 0.12 K/uL    Immature Granulocytes (abs) 0.00 0.00 - 0.11 K/uL    NRBC (Absolute) 0.00 K/uL   COMP METABOLIC PANEL   Result Value Ref Range    Sodium 142 135 - 145 mmol/L    Potassium 3.9 3.6 - 5.5 mmol/L    Chloride 104 96 - 112 mmol/L    Co2 28 20 - 33 mmol/L    Anion Gap 10.0 7.0 - 16.0    Glucose 138  (H) 65 - 99 mg/dL    Bun 9 8 - 22 mg/dL    Creatinine 0.63 0.50 - 1.40 mg/dL    Calcium 7.9 (L) 8.4 - 10.2 mg/dL    AST(SGOT) 46 (H) 12 - 45 U/L    ALT(SGPT) 27 2 - 50 U/L    Alkaline Phosphatase 56 30 - 99 U/L    Total Bilirubin 0.3 0.1 - 1.5 mg/dL    Albumin 2.9 (L) 3.2 - 4.9 g/dL    Total Protein 5.0 (L) 6.0 - 8.2 g/dL    Globulin 2.1 1.9 - 3.5 g/dL    A-G Ratio 1.4 g/dL   TROPONIN   Result Value Ref Range    Troponin T 25 (H) 6 - 19 ng/L   proBrain Natriuretic Peptide, NT   Result Value Ref Range    NT-proBNP 1366 (H) 0 - 125 pg/mL   URINALYSIS (UA)    Specimen: Urine   Result Value Ref Range    Color Yellow     Character Clear     Specific Gravity 1.025 <1.035    Ph 6.5 5.0 - 8.0    Glucose Negative Negative mg/dL    Ketones Negative Negative mg/dL    Protein Negative Negative mg/dL    Bilirubin Negative Negative    Nitrite Negative Negative    Leukocyte Esterase Negative Negative    Occult Blood Negative Negative    Micro Urine Req see below    APTT   Result Value Ref Range    APTT 29.0 24.7 - 36.0 sec   PROTHROMBIN TIME (INR)   Result Value Ref Range    PT 13.8 12.0 - 14.6 sec    INR 1.15 (H) 0.87 - 1.13   D-DIMER   Result Value Ref Range    D-Dimer Screen 1.29 (H) 0.00 - 0.50 ug/mL (FEU)   COV-2, FLU A/B, AND RSV BY PCR (2-4 HOURS CEPHEID): Collect NP swab in VTM    Specimen: Nasopharyngeal; Respirate   Result Value Ref Range    Influenza virus A RNA Negative Negative    Influenza virus B, PCR Negative Negative    RSV, PCR Negative Negative    SARS-CoV-2 by PCR NotDetected     SARS-CoV-2 Source NP Swab    ESTIMATED GFR   Result Value Ref Range    GFR If African American >60 >60 mL/min/1.73 m 2    GFR If Non African American >60 >60 mL/min/1.73 m 2   EKG   Result Value Ref Range    Report       [facility] Emergency Dept.    Test Date:  2021-12-25  Pt Name:    VICK LI             Department: [facility]  MRN:        [id number]                      Room:  Gender:     Female                        Technician: [id number]  :        1960                   Requested By:ER TRIAGE PROTOCOL  Order #:    [id number]                    Reading MD:    Measurements  Intervals                                Axis  Rate:       83                           P:          55  NV:         160                          QRS:        63  QRSD:       72                           T:          58  QT:         388  QTc:        456    Interpretive Statements  SINUS RHYTHM  CONSIDER ANTEROSEPTAL INFARCT  Compared to ECG 09/10/2021 20:14:44  Myocardial infarct finding now present  Prolonged QT interval no longer present       *Note: Due to a large number of results and/or encounters for the requested time period, some results have not been displayed. A complete set of results can be found in Results Review.     Labs reviewed by me    EKG  Results for orders placed or performed during the hospital encounter of 21   EKG   Result Value Ref Range    Report       [facility] Emergency Dept.    Test Date:  2021  Pt Name:    VICK LI             Department: [facility]  MRN:        [id number]                      Room:  Gender:     Female                       Technician: [id number]  :        1960                   Requested By:ER TRIAGE PROTOCOL  Order #:    [id number]                    Reading MD:    Measurements  Intervals                                Axis  Rate:       83                           P:          55  NV:         160                          QRS:        63  QRSD:       72                           T:          58  QT:         388  QTc:        456    Interpretive Statements  SINUS RHYTHM  CONSIDER ANTEROSEPTAL INFARCT  Compared to ECG 09/10/2021 20:14:44  Myocardial infarct finding now present  Prolonged QT interval no longer present       *Note: Due to a large number of results and/or encounters for the requested time period, some results have not been displayed. A complete set of  results can be found in Results Review.         RADIOLOGY/PROCEDURES  DX-CHEST-PORTABLE (1 VIEW)   Final Result      Mild pulmonary edema with small bilateral pleural effusions and bibasilar infiltrate versus atelectasis.      US-EXTREMITY VENOUS LOWER BILAT    (Results Pending)     Results and radiologist interpretation reviewed by me.     COURSE & MEDICAL DECISION MAKING  Pertinent Labs & Imaging studies reviewed. (See chart for details)    6:33 PM - Patient seen and evaluated at bedside. Ordered for DX-Chest, D-Dimer, Cov-2, Flu A/B and RSV, CBC with diff, CMP, Troponin, proBNP, Blood Culture x 2, UA, APTT, and INR to evaluate. Differential diagnoses include, but are not limited to, sarcoidosis exacerbation, congestive heart failure, pneumonia     9:15 PM Patient was reevaluated at bedside. Discussed lab and radiology results with the patient and informed them of results noted above.   Laboratories revealed a low white count of 2.3, depleted hemoglobin of 8.8 with a crit of 29.  Her platelets were also low at 90 indicate pancytopenia.  Electrolytes are all unremarkable.  Her calcium is quite low at 7.9.  She has mildly elevated liver enzymes.  Her twelve-lead EKG shows sinus rhythm at a rate of 83 bpm with no acute ST elevation or depression.  Covid influenza and RSV were negative.  Urinalysis is negative.  Her D-dimer was elevated 1.25, troponin is elevated at 25 and her BNP is 1366.  I gave her some Ativan, some Lasix, because she was a very hard stick and we could not get a large enough IV in her to do a CTA of her chest we will do ultrasounds of her bilateral lower extremities to rule out possible DVT in which case she might have a a pulmonary embolus.  I discussed everything with both she and her .  My plan will be to admit her into the hospital.  I spoke with Dr. Merino on-call for hospitalist tonight and the patient will be admitted.  She is currently in guarded condition.    FINAL IMPRESSION  1.  Acute congestive heart failure, unspecified heart failure type (HCC)    2. Elevated troponin    3. SOB (shortness of breath)    4. Elevated d-dimer    5. Elevated brain natriuretic peptide (BNP) level    6. Increased oxygen demand    7. History of sarcoidosis         Vanesa BRODY (Kodyibsujey), am scribing for, and in the presence of, Hanane Hernandez D.O..    Electronically signed by: Vanesa Monterroso (Scribe), 12/25/2021    IHanane D.O. personally performed the services described in this documentation, as scribed by Vanesa Monterroso in my presence, and it is both accurate and complete.    The note accurately reflects work and decisions made by me.  Hanane Hernandez D.O.  12/25/2021  11:43 PM

## 2021-12-26 NOTE — ASSESSMENT & PLAN NOTE
Lower extremity edema and slightly worsened pulmonary edema and pleural effusions on CXR, BNP elevated at 1300  Echocardiogram 08/2021 showed EF of 65%, normal RV function, moderate AR, mild MR, RVSP at 35  Symptoms may be due to pulmonary hypertension vs worsening AR vs CHF- vs sarcoid flare will give low dose IV Lasix for now and monitor  Echocardiogram ordered  Symptoms and severity of illness currently not suggestive of a sarcoid flare, especially with her being on immunosuppressants; if she fails to improve with diuresis, will ask Pulm to follow

## 2021-12-26 NOTE — ASSESSMENT & PLAN NOTE
Small wound with surrounding erythema on the right lower extremity. She says she noticed purulent discharge a few days ago  Will change Bactrim to Unasyn and Doxy as she already has chronic cytopenias  Will culture the wound  MRSA nares pending, stop Doxy if negative

## 2021-12-27 NOTE — CARE PLAN
The patient is Stable - Low risk of patient condition declining or worsening    Shift Goals  Clinical Goals: Patient oxygen saturation will remain 90% and above.  Patient Goals: Pain control    Progress made toward(s) clinical / shift goals:  Patient's oxygen saturation remains 90% and above. Patient had a walking O2 assessment and when her oxygen was removed, it goes down to 79% on RA. Patient remains on 3L this shift.      Problem: Knowledge Deficit - Standard  Goal: Patient and family/care givers will demonstrate understanding of plan of care, disease process/condition, diagnostic tests and medications  12/27/2021 1242 by Vicenta Hoff R.N.  Outcome: Progressing  12/27/2021 1237 by Vicenta Hoff R.N.  Outcome: Progressing
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
## 2021-12-27 NOTE — CARE PLAN
The patient is Stable - Low risk of patient condition declining or worsening    Shift Goals  Clinical Goals: Patient oxygen saturation will remain above 90%.   Patient Goals: Pain control    Progress made toward(s) clinical / shift goals:  Patient has been using 4L of oxygen this shift. Her oxygen saturation remains above 90% this shift.      Problem: Knowledge Deficit - Standard  Goal: Patient and family/care givers will demonstrate understanding of plan of care, disease process/condition, diagnostic tests and medications  Outcome: Progressing     Problem: Fall Risk  Goal: Patient will remain free from falls  Outcome: Progressing

## 2021-12-27 NOTE — PROGRESS NOTES
Received report from PENNY Garcia.  Assumed Pt. Care  Pt. A&Ox4  No sign of cardiac and respiratory distress.  Pain is 6/10 Generalized pain. Pain medication was given prior to change shift. Patient was very anxious after PT session. Given anti anxiety meds per order.  Pt. Is sitting up in bed.  Bed at lowest position.  Call light and personal belonging within reach.   Encourage to call for assistance.

## 2021-12-27 NOTE — CARE PLAN
The patient is Stable - Low risk of patient condition declining or worsening    Shift Goals  Clinical Goals: Maintain adequate oxygenation   Patient Goals: Pain control    Progress made toward(s) clinical / shift goals:  Patient has maintained adequate oxygenation on Baseline oxygenation    Patient is not progressing towards the following goals:      Problem: Pain - Standard  Goal: Alleviation of pain or a reduction in pain to the patient’s comfort goal  Outcome: Progressing   New medication added to treatment to help in controlling patients pain.   Problem: Knowledge Deficit - Standard  Goal: Patient and family/care givers will demonstrate understanding of plan of care, disease process/condition, diagnostic tests and medications  Outcome: Progressing   Patient demonstrated verbal understanding of education provided. Reinforcement required.

## 2021-12-27 NOTE — DISCHARGE INSTRUCTIONS
Diet: Diet Order Diet: Cardiac; Second Modifier: (optional): 2 Gram Sodium; Fluid modifications: (optional): 1800 ml Fluid Restriction  Activity: As tolerated  Follow Up: PCP in ~1 week to have your potassium rechecked, Dr Phan on January 6th to monitor your volume status   Disposition:Home   Diagnosis: Volume overload    Follow up with your Primary Care Provider Elisa Phillip M.D. as scheduled or sooner if your symptoms persist or worsen.  Return to Emergency Room for severe chest pain, shortness of breath, signs of a stroke, or any other emergencies.      Discharge Instructions    Discharged to home by car with relative. Discharged via wheelchair, hospital escort: Yes.  Special equipment needed: Oxygen    Be sure to schedule a follow-up appointment with your primary care doctor or any specialists as instructed.     Discharge Plan:   Influenza Vaccine Indication: Not indicated: Previously immunized this influenza season and > 8 years of age    I understand that a diet low in cholesterol, fat, and sodium is recommended for good health. Unless I have been given specific instructions below for another diet, I accept this instruction as my diet prescription.   Other diet: Follow diet instruction    Special Instructions: None    · Is patient discharged on Warfarin / Coumadin?   No       Depression / Suicide Risk    As you are discharged from this [facility] facility, it is important to learn how to keep safe from harming yourself.    Recognize the warning signs:  · Abrupt changes in personality, positive or negative- including increase in energy   · Giving away possessions  · Change in eating patterns- significant weight changes-  positive or negative  · Change in sleeping patterns- unable to sleep or sleeping all the time   · Unwillingness or inability to communicate  · Depression  · Unusual sadness, discouragement and loneliness  · Talk of wanting to die  · Neglect of personal appearance   · Rebelliousness-  reckless behavior  · Withdrawal from people/activities they love  · Confusion- inability to concentrate     If you or a loved one observes any of these behaviors or has concerns about self-harm, here's what you can do:  · Talk about it- your feelings and reasons for harming yourself  · Remove any means that you might use to hurt yourself (examples: pills, rope, extension cords, firearm)  · Get professional help from the community (Mental Health, Substance Abuse, psychological counseling)  · Do not be alone:Call your Safe Contact- someone whom you trust who will be there for you.  · Call your local CRISIS HOTLINE 712-3522 or 981-744-7849  · Call your local Children's Mobile Crisis Response Team [place] (385) 664-7961 or www.[employer]il[employer]  · Call the toll free National Suicide Prevention Hotlines   · National Suicide Prevention Lifeline 067-404-RXZX (3236)  · National Hope Line Network 800-SUICIDE (649-0933)

## 2021-12-27 NOTE — FACE TO FACE
"Face to Face Note  -  Durable Medical Equipment    Chiquita Garcia M.D. - NPI: [id number]  I certify that this patient is under my care and that they had a durable medical equipment(DME)face to face encounter by myself that meets the physician DME face-to-face encounter requirements with this patient on:    Date of encounter:   Patient:                    MRN:                       YOB: 2021  Roxana Cuba  [id number]  1960     The encounter with the patient was in whole, or in part, for the following medical condition, which is the primary reason for durable medical equipment:  Other - Hepatopulmonary syndrome    I certify that, based on my findings, the following durable medical equipment is medically necessary:  Oxygen.    HOME O2 Saturation Measurements:(Values must be present for Home Oxygen orders)  Room air sat at rest: 82  Room air sat with amb: 79  With liters of O2: 3, O2 sat at rest with O2: 93  With Liters of O2: 3, O2 sat with amb with O2 : 97  Is the patient mobile?: Yes    My Clinical findings support the need for the above equipment due to:  Hypoxia    Supporting Symptoms: The patient requires supplemental oxygen, as the following interventions have been tried with limited or no improvement: \"Ambulation with oximetry    If patient feels more short of breath, they can go up to 6 liters per minute and contact healthcare provider.  "

## 2021-12-27 NOTE — PROGRESS NOTES
1342- All home medication from after hour pharmacy and medication bin was returned to the patients . Patient was aware.

## 2021-12-27 NOTE — PROGRESS NOTES
Reviewed discharge instructions with patient and family. Patient show understanding of discharge instructions, all questions answered. IV  Dc'd. Patient meet criteria  for discharge. Discharge paper signed. Patient was escorted outside hospital on a wheelchair with CNA.

## 2021-12-27 NOTE — DISCHARGE SUMMARY
Discharge Summary    CHIEF COMPLAINT ON ADMISSION  Chief Complaint   Patient presents with   • Shortness of Breath   • Chest Pain       Reason for Admission  shortness of breath, chest pain      Admission Date  12/25/2021    CODE STATUS  Full Code    HPI & HOSPITAL COURSE  This is a 61 y.o. female here with SOB, PMH of sarcoidosis, hepatopulmonary hypertension, s/p ASD repair, chronic hypoxia on 3 L O2, cirrhosis s/p liver transplant 2011 on immunosuppressants, pancytopenia, hypothyroidism, MGUS, depression, recent subdural hematoma secondary to TBI causing seizures, dyslipidemia, GERD, adrenal insufficiency, chronic pain, history of gastric sleeve surgery with recurrent aspiration and repeat surgery 2018.    The patient's SOB was secondary to mild volume overload; CTA was negative, procal negative, no infiltrate seen on imaging. She had been told, per patient, to hold her home Lasix due to hypokalemia prior to admit, which likely exacerbated her overload. Her echo is largely normal, I think her fluid retention may be due to Florinef for adrenal insufficiency.  She was diuresed in house and is now euvolemic, on her home 3L. She relates having fevers at home and states a small wound on her R leg was purulent - she has not been febrile here, and has had no purulence, but given her immunosuppressed status, I will treat with 5d of Keflex at discharge; MRSA nares was negative. She is stable to go home without any needs.      Therefore, she is discharged in good and stable condition to home with close outpatient follow-up.    The patient met 2-midnight criteria for an inpatient stay at the time of discharge.    Discharge Date  12/27/21    FOLLOW UP ITEMS POST DISCHARGE  PCP in one week for potassium check  Dr Phan on January 6th for volume status    DISCHARGE DIAGNOSES  Principal Problem:    Volume overload POA: Yes  Active Problems:    Acquired hypothyroidism POA: Yes    Sarcoidosis (HCC) POA: Yes    Hepatopulmonary  syndrome (HCC) POA: Yes    Acute on chronic respiratory failure with hypoxia (HCC) POA: Yes    Chronic pain syndrome POA: Yes    GERD (gastroesophageal reflux disease) POA: Yes    Recurrent major depressive disorder, in remission (HCC) POA: Yes    Primary pulmonary hypertension (HCC) POA: Yes    Adrenal insufficiency (HCC) POA: Yes    Pancytopenia (HCC) POA: Yes    Seizures (HCC) POA: Yes    Cellulitis of right lower extremity POA: Yes  Resolved Problems:    * No resolved hospital problems. *      FOLLOW UP  Future Appointments   Date Time Provider Department Center   12/29/2021 10:30 AM [facility] 1 [facility] [address] [place] [address]   12/30/2021 10:00 AM Elisa Phillip M.D. [facility] None   1/6/2022  9:30 AM Maxwell Phan M.D. [facility] None   1/13/2022  1:20 PM Gwyn Gaming M.D. [facility] None   3/9/2022  2:40 PM Maxwell Stone D.O. [facility] None     Elisa Phillip M.D.  [address]  [place] [address]  [phone]    In 1 week      Maxwell Phan M.D.  [address] [facility]  [address]  [place] [address]  [phone]    On 1/6/2022        MEDICATIONS ON DISCHARGE     Medication List      START taking these medications      Instructions   cephALEXin 250 MG Caps  Commonly known as: KEFLEX   Take 2 Capsules by mouth 4 times a day for 5 days.  Dose: 500 mg        CHANGE how you take these medications      Instructions   potassium chloride SA 20 MEQ Tbcr  What changed: when to take this  Commonly known as: Kdur   Take 1 Tablet by mouth every day.  Dose: 20 mEq        CONTINUE taking these medications      Instructions   ambrisentan 10 MG tablet  Commonly known as: LETAIRIS   TAKE 1 TABLET BY MOUTH EVERY DAY  Dose: 10 mg     fludrocortisone 0.1 MG Tabs  Commonly known as: FLORINEF   Take 2 Tablets by mouth every day. Indications: addisons disease  Dose: 0.2 mg     furosemide 20 MG Tabs  Commonly known as: LASIX   Take 1 Tablet by mouth every day. Indications: Edema  Dose: 20 mg     gabapentin 100 MG Caps  Commonly known as:  NEURONTIN   Take 1 Capsule by mouth 2 times a day.  Dose: 100 mg     Home Care Oxygen   Inhale 3 L/min continuous. Oxygen dose range: 3 L/min  Respiratory route via: Nasal Cannula   Oxygen supplier: Preferred  Dose: 3 L/min     lacosamide 100 MG Tabs tablet  Commonly known as: VIMPAT   Take 1 Tablet by mouth 2 times a day for 90 days.  Dose: 100 mg     levetiracetam 750 MG tablet  Commonly known as: KEPPRA   Take 1 Tablet by mouth 2 times a day.  Dose: 750 mg     levothyroxine 137 MCG Tabs  Commonly known as: SYNTHROID   Take 1 Tablet by mouth every morning on an empty stomach.  Dose: 137 mcg     mycophenolate 250 MG Caps  Commonly known as: CELLCEPT   Take 250 mg by mouth 2 times a day. Indications: Liver Transplant Recipient  Dose: 250 mg     omeprazole 40 MG delayed-release capsule  Commonly known as: PRILOSEC   Take 1 Capsule by mouth every day.  Dose: 40 mg     ondansetron 4 MG Tbdp  Commonly known as: ZOFRAN ODT   Take 1 Tablet by mouth every 8 hours as needed for Nausea.  Dose: 4 mg     oxyCODONE immediate release 10 MG immediate release tablet  Commonly known as: ROXICODONE   Take 10 mg by mouth every 6 hours as needed for Moderate Pain.  Dose: 10 mg     pravastatin 20 MG Tabs  Commonly known as: PRAVACHOL   Take 1 Tablet by mouth every day.  Dose: 20 mg     sertraline 100 MG Tabs  Commonly known as: Zoloft   TAKE 1 TABLET BY MOUTH EVERY DAY  Dose: 100 mg     tacrolimus 1 MG Caps  Commonly known as: PROGRAF      Tadalafil (PAH) 20 MG Tabs   Take 20 mg by mouth every day.  Dose: 20 mg     tadalafil 20 MG tablet  Commonly known as: CIALIS   Take 1 Tablet by mouth every day.  Dose: 20 mg     therapeutic multivitamin-minerals Tabs   Take 1 tablet by mouth every day.  Dose: 1 Tablet     traZODone 50 MG Tabs  Commonly known as: DESYREL   Take 1 Tablet by mouth at bedtime.  Dose: 50 mg        STOP taking these medications    acetaminophen 500 MG Tabs  Commonly known as: TYLENOL     docusate sodium 100 MG  Caps  Commonly known as: COLACE     Movantik 25 MG Tabs  Generic drug: Naloxegol Oxalate     prednisONE 2.5 MG Tabs  Commonly known as: DELTASONE            Allergies  Allergies   Allergen Reactions   • Rhubarb Anaphylaxis   • Organic Nitrates Unspecified     Nitroglycerin products should be avoided with the use of PDE-5 inhibitors as the combination can result in severe hypotension.  RD clarified with pt: Nitrates in food are okay and pt does not want nitrates to be listed as food allergy. Pt only avoids medications with nitrates.    • Other Drug      Any medication that may interact with cyclosporine, tacrolimus, sirolimus, or prograf (due to hx of liver transplant)    • Vancomycin Hcl Unspecified     Causes red man syndrome when infused to fast, ok if slowed infusion   • Nsaids Unspecified     Can not take due to hx of liver transplant        DIET  Orders Placed This Encounter   Procedures   • Diet Order Diet: Cardiac; Second Modifier: (optional): 2 Gram Sodium; Fluid modifications: (optional): 1500 ml Fluid Restriction     Standing Status:   Standing     Number of Occurrences:   1     Order Specific Question:   Diet:     Answer:   Cardiac [6]     Order Specific Question:   Second Modifier: (optional)     Answer:   2 Gram Sodium [7]     Order Specific Question:   Fluid modifications: (optional)     Answer:   1500 ml Fluid Restriction [9]       ACTIVITY  As tolerated.  Weight bearing as tolerated    CONSULTATIONS  None    PROCEDURES  None    LABORATORY  Lab Results   Component Value Date    SODIUM 145 12/27/2021    POTASSIUM 3.6 12/27/2021    CHLORIDE 105 12/27/2021    CO2 30 12/27/2021    GLUCOSE 80 12/27/2021    BUN 8 12/27/2021    CREATININE 0.73 12/27/2021        Lab Results   Component Value Date    WBC 2.2 (LL) 12/27/2021    HEMOGLOBIN 8.8 (L) 12/27/2021    HEMATOCRIT 29.4 (L) 12/27/2021    PLATELETCT 103 (L) 12/27/2021        Total time of the discharge process exceeds 34 minutes.

## 2021-12-27 NOTE — THERAPY
Occupational Therapy     Patient Name: Roxana Cuba  Age:  61 y.o., Sex:  female  Medical Record #: [id number]  Today's Date: 12/27/2021    Discussed missed therapy with RN, PT       12/27/21 0930   Interdisciplinary Plan of Care Collaboration   Collaboration Comments OT orders rec'd, pt observed walking supervised with PT and 3L O2.  Appears at baseline per PT- pt mildly confused but compared to notes from this therapist from 9/2021 this is baseline for pt and she has supervision from spouse and intermittent assist from Son and DIL as needed.  Pt has all needed AE for ADL's at home. No apparent need for OT eval at this time, will discontinue order.

## 2021-12-27 NOTE — THERAPY
Physical Therapy   Initial Evaluation     Patient Name: Roxana Cuba  Age:  61 y.o., Sex:  female  Medical Record #: [id number]  Today's Date: 12/27/2021     Precautions  Comments: (P) baseline 3 L O2 use     Assessment  Patient is 61 y.o. female who was recently admitted with SOB secondary to volume overload and R LE cellulitis. Pt has a hx of seizures, TBI, and craniotomy. Pt was agreeable to therapy evaluation and was able to demonstrate safe upright mobility with no conor LOB or AD use. Pt was able to demonstrate SpO2 of 97% on 3 L O2 with ambulation and upright mobility with SPV level of assist. Pt is in no acute skilled PT needs at this time, anticipate to d/c home. Anticipate that the patient will have no further physical therapy needs after discharge from the hospital.    Plan    Patient will not be actively followed for physical therapy services at this time, however may be seen if requested by physician for 1 more visit within 30 days to address any discharge or equipment needs    DC Equipment Recommendations: (P) None  Discharge Recommendations: (P) Anticipate that the patient will have no further physical therapy needs after discharge from the hospital     Objective       12/27/21 0844   Initial Contact Note    Initial Contact Note Order Received and Verified, Physical Therapy Evaluation in Progress with Full Report to Follow.   Precautions   Comments baseline 3 L O2 use    Vitals   O2 (LPM) 3   O2 Delivery Device Nasal Cannula   Pain 0 - 10 Group   Therapist Pain Assessment Nurse Notified;0   Prior Living Situation   Prior Services None   Housing / Facility 1 Story House   Steps Into Home 0   Steps In Home 0   Equipment Owned 4-Wheel Walker   Lives with - Patient's Self Care Capacity Spouse   Comments pt states spouse will be able to assist upon d/c to home    Prior Level of Functional Mobility   Bed Mobility Independent   Transfer Status Independent   Ambulation Independent   Distance Ambulation  (Feet)   (community )   Assistive Devices Used None   Stairs Independent   Comments reports of an IPLOF    Cognition    Cognition / Consciousness X   Level of Consciousness Alert   Attention Impaired   Comments easily agitated and presents with delayed processing at times ; hx of TBI/crani   Passive ROM Lower Body   Passive ROM Lower Body WDL   Active ROM Lower Body    Active ROM Lower Body  WDL   Strength Lower Body   Lower Body Strength  WDL   Sensation Lower Body   Lower Extremity Sensation   WDL   Lower Body Muscle Tone   Lower Body Muscle Tone  WDL   Strength Upper Body   Upper Body Strength  WDL   Upper Body Muscle Tone   Upper Body Muscle Tone  WDL   Neurological Concerns   Neurological Concerns Yes   Comments hx of seizures, TBI, and crani   Coordination Upper Body   Coordination WDL   Coordination Lower Body    Coordination Lower Body  WDL   Balance Assessment   Sitting Balance (Static) Good   Sitting Balance (Dynamic) Good   Standing Balance (Static) Good   Standing Balance (Dynamic) Good   Weight Shift Sitting Good   Weight Shift Standing Good   Comments no AD use   Gait Analysis   Gait Level Of Assist Supervised   Assistive Device None   Distance (Feet) 150   # of Times Distance was Traveled 1   Deviation Other (Comment)  (dec shayla )   Weight Bearing Status fwb   Comments able to demo SpO2 of 97% with 3 L O2    Bed Mobility    Supine to Sit Supervised   Sit to Supine Supervised   Scooting Supervised   Rolling Supervised   Functional Mobility   Sit to Stand Supervised   Bed, Chair, Wheelchair Transfer Supervised   Transfer Method Stand Step   Mobility EOB, sit<>stand, ambulation, back to bed    How much difficulty does the patient currently have...   Turning over in bed (including adjusting bedclothes, sheets and blankets)? 4   Sitting down on and standing up from a chair with arms (e.g., wheelchair, bedside commode, etc.) 4   Moving from lying on back to sitting on the side of the bed? 4   How much  help from another person does the patient currently need...   Moving to and from a bed to a chair (including a wheelchair)? 4   Need to walk in a hospital room? 4   Climbing 3-5 steps with a railing? 3   6 clicks Mobility Score 23   Activity Tolerance   Sitting in Chair Nt   Sitting Edge of Bed 5 mins   Standing 10 mins   Comments no adverse events noted; reported of slight SOB    Edema / Skin Assessment   Edema / Skin  Not Assessed   Education Group   Education Provided Role of Physical Therapist   Role of Physical Therapist Patient Response Patient;Acceptance;Explanation;Demonstration;Verbal Demonstration;Action Demonstration   Anticipated Discharge Equipment and Recommendations   DC Equipment Recommendations None   Discharge Recommendations Anticipate that the patient will have no further physical therapy needs after discharge from the hospital   Interdisciplinary Plan of Care Collaboration   IDT Collaboration with  Nursing   Patient Position at End of Therapy In Bed;Call Light within Reach;Tray Table within Reach;Phone within Reach   Collaboration Comments aware of visit and recs    Session Information   Date / Session Number  12/27 d/c needs only    Priority 0

## 2021-12-28 NOTE — TELEPHONE ENCOUNTER
Call patient and inform that at her next appointment we can consider weaning vimpat and staying on keppra alone.     Dr. Alexandr Stone D.O.  Replaced by [facility]   Movement Disorders Specialist

## 2021-12-28 NOTE — PROGRESS NOTES
"RN Transitional Care Management discharge follow up call completed. Patient did not have any questions regarding medications or follow up care. Patient stated her she was very unhappy with her stay. Pt states \"the room was completley dirty, Had buggers on the wall\" and none of the staff spoke english.  Patient has discharge follow up appointment scheduled with PCP on 12/30. Please route chart back to RN if additional follow up is needed/requested.       Thank you!     Nomi Platt Care Coordinator [phone]    "

## 2021-12-29 NOTE — TELEPHONE ENCOUNTER
ESTABLISHED PATIENT PRE-VISIT PLANNING     Patient was NOT contacted to complete PVP.     Note: Patient will not be contacted if there is no indication to call.     1.  Reviewed notes from the last few office visits within the medical group: Yes    2.  If any orders were placed at last visit or intended to be done for this visit (i.e. 6 mos follow-up), do we have Results/Consult Notes?         •  Labs - Labs were not ordered at last office visit.  Note: If patient appointment is for lab review and patient did not complete labs, check with provider if OK to reschedule patient until labs completed.       •  Imaging - Imaging ordered, completed and results are in chart.       •  Referrals - No referrals were ordered at last office visit.    3. Is this appointment scheduled as a Hospital Follow-Up? Yes, visit was at [facility].     4.  Immunizations were updated in Epic using Reconcile Outside Information activity? Yes    5.  Patient is due for the following Health Maintenance Topics:   Health Maintenance Due   Topic Date Due   • Annual Wellness Visit  Never done   • IMM ZOSTER VACCINES (2 of 2) 01/29/2019         6.  AHA (Pulse8) form printed for Provider? No, patient does not have any open alerts

## 2022-01-01 ENCOUNTER — APPOINTMENT (OUTPATIENT)
Dept: RADIOLOGY | Facility: MEDICAL CENTER | Age: 62
DRG: 388 | End: 2022-01-01
Attending: INTERNAL MEDICINE
Payer: MEDICARE

## 2022-01-01 ENCOUNTER — APPOINTMENT (OUTPATIENT)
Dept: RADIOLOGY | Facility: MEDICAL CENTER | Age: 62
End: 2022-01-01
Attending: EMERGENCY MEDICINE
Payer: MEDICARE

## 2022-01-01 ENCOUNTER — APPOINTMENT (OUTPATIENT)
Dept: RADIOLOGY | Facility: MEDICAL CENTER | Age: 62
DRG: 085 | End: 2022-01-01
Attending: EMERGENCY MEDICINE
Payer: MEDICARE

## 2022-01-01 ENCOUNTER — TELEPHONE (OUTPATIENT)
Dept: MEDICAL GROUP | Facility: LAB | Age: 62
End: 2022-01-01

## 2022-01-01 ENCOUNTER — APPOINTMENT (OUTPATIENT)
Dept: MEDICAL GROUP | Facility: LAB | Age: 62
End: 2022-01-01
Payer: MEDICARE

## 2022-01-01 ENCOUNTER — DOCUMENTATION (OUTPATIENT)
Dept: MEDICAL GROUP | Facility: PHYSICIAN GROUP | Age: 62
End: 2022-01-01

## 2022-01-01 ENCOUNTER — APPOINTMENT (OUTPATIENT)
Dept: RADIOLOGY | Facility: MEDICAL CENTER | Age: 62
DRG: 956 | End: 2022-01-01
Attending: STUDENT IN AN ORGANIZED HEALTH CARE EDUCATION/TRAINING PROGRAM
Payer: MEDICARE

## 2022-01-01 ENCOUNTER — APPOINTMENT (OUTPATIENT)
Dept: RADIOLOGY | Facility: MEDICAL CENTER | Age: 62
DRG: 956 | End: 2022-01-01
Attending: HOSPITALIST
Payer: MEDICARE

## 2022-01-01 ENCOUNTER — TELEPHONE (OUTPATIENT)
Dept: MEDICAL GROUP | Facility: LAB | Age: 62
End: 2022-01-01
Payer: MEDICARE

## 2022-01-01 ENCOUNTER — HOME CARE VISIT (OUTPATIENT)
Dept: HOME HEALTH SERVICES | Facility: HOME HEALTHCARE | Age: 62
End: 2022-01-01
Payer: MEDICARE

## 2022-01-01 ENCOUNTER — APPOINTMENT (OUTPATIENT)
Dept: RADIOLOGY | Facility: MEDICAL CENTER | Age: 62
DRG: 917 | End: 2022-01-01
Attending: STUDENT IN AN ORGANIZED HEALTH CARE EDUCATION/TRAINING PROGRAM
Payer: MEDICARE

## 2022-01-01 ENCOUNTER — HOME HEALTH ADMISSION (OUTPATIENT)
Dept: HOME HEALTH SERVICES | Facility: HOME HEALTHCARE | Age: 62
End: 2022-01-01
Payer: MEDICARE

## 2022-01-01 ENCOUNTER — OFFICE VISIT (OUTPATIENT)
Dept: MEDICAL GROUP | Facility: LAB | Age: 62
End: 2022-01-01
Payer: MEDICARE

## 2022-01-01 ENCOUNTER — TELEPHONE (OUTPATIENT)
Dept: NEUROLOGY | Facility: MEDICAL CENTER | Age: 62
End: 2022-01-01

## 2022-01-01 ENCOUNTER — OFFICE VISIT (OUTPATIENT)
Dept: CARDIOLOGY | Facility: MEDICAL CENTER | Age: 62
End: 2022-01-01

## 2022-01-01 ENCOUNTER — HOSPITAL ENCOUNTER (OUTPATIENT)
Dept: RADIOLOGY | Facility: MEDICAL CENTER | Age: 62
End: 2022-08-28
Attending: NURSE PRACTITIONER
Payer: MEDICARE

## 2022-01-01 ENCOUNTER — PATIENT OUTREACH (OUTPATIENT)
Dept: MEDICAL GROUP | Facility: LAB | Age: 62
End: 2022-01-01

## 2022-01-01 ENCOUNTER — HOSPITAL ENCOUNTER (EMERGENCY)
Facility: MEDICAL CENTER | Age: 62
End: 2022-04-16
Attending: EMERGENCY MEDICINE
Payer: MEDICARE

## 2022-01-01 ENCOUNTER — HOSPITAL ENCOUNTER (EMERGENCY)
Facility: MEDICAL CENTER | Age: 62
End: 2022-08-21
Attending: EMERGENCY MEDICINE
Payer: MEDICARE

## 2022-01-01 ENCOUNTER — HOSPITAL ENCOUNTER (OUTPATIENT)
Dept: RADIOLOGY | Facility: MEDICAL CENTER | Age: 62
End: 2022-04-19
Attending: FAMILY MEDICINE
Payer: MEDICARE

## 2022-01-01 ENCOUNTER — PATIENT OUTREACH (OUTPATIENT)
Dept: MEDICAL GROUP | Facility: LAB | Age: 62
End: 2022-01-01
Payer: MEDICARE

## 2022-01-01 ENCOUNTER — OFFICE VISIT (OUTPATIENT)
Dept: NEUROLOGY | Facility: MEDICAL CENTER | Age: 62
End: 2022-01-01
Attending: CLINICAL NEUROPSYCHOLOGIST
Payer: MEDICARE

## 2022-01-01 ENCOUNTER — APPOINTMENT (OUTPATIENT)
Dept: RADIOLOGY | Facility: MEDICAL CENTER | Age: 62
End: 2022-01-01
Attending: FAMILY MEDICINE
Payer: MEDICARE

## 2022-01-01 ENCOUNTER — OFFICE VISIT (OUTPATIENT)
Dept: NEUROLOGY | Facility: MEDICAL CENTER | Age: 62
DRG: 085 | End: 2022-01-01
Attending: PSYCHIATRY & NEUROLOGY
Payer: MEDICARE

## 2022-01-01 ENCOUNTER — APPOINTMENT (OUTPATIENT)
Dept: RADIOLOGY | Facility: MEDICAL CENTER | Age: 62
DRG: 100 | End: 2022-01-01
Attending: EMERGENCY MEDICINE
Payer: MEDICARE

## 2022-01-01 ENCOUNTER — PHARMACY VISIT (OUTPATIENT)
Dept: PHARMACY | Facility: MEDICAL CENTER | Age: 62
End: 2022-01-01
Payer: MEDICARE

## 2022-01-01 ENCOUNTER — APPOINTMENT (OUTPATIENT)
Dept: RADIOLOGY | Facility: MEDICAL CENTER | Age: 62
DRG: 388 | End: 2022-01-01
Attending: STUDENT IN AN ORGANIZED HEALTH CARE EDUCATION/TRAINING PROGRAM
Payer: MEDICARE

## 2022-01-01 ENCOUNTER — APPOINTMENT (OUTPATIENT)
Dept: RADIOLOGY | Facility: MEDICAL CENTER | Age: 62
DRG: 388 | End: 2022-01-01
Attending: FAMILY MEDICINE
Payer: MEDICARE

## 2022-01-01 ENCOUNTER — APPOINTMENT (OUTPATIENT)
Dept: SLEEP MEDICINE | Facility: MEDICAL CENTER | Age: 62
End: 2022-01-01
Payer: MEDICARE

## 2022-01-01 ENCOUNTER — ANESTHESIA (OUTPATIENT)
Dept: SURGERY | Facility: MEDICAL CENTER | Age: 62
End: 2022-01-01
Payer: MEDICARE

## 2022-01-01 ENCOUNTER — HOSPITAL ENCOUNTER (EMERGENCY)
Facility: MEDICAL CENTER | Age: 62
End: 2022-03-26
Attending: EMERGENCY MEDICINE
Payer: MEDICARE

## 2022-01-01 ENCOUNTER — HOSPITAL ENCOUNTER (OUTPATIENT)
Dept: LAB | Facility: MEDICAL CENTER | Age: 62
End: 2022-01-04
Attending: FAMILY MEDICINE
Payer: MEDICARE

## 2022-01-01 ENCOUNTER — HOSPITAL ENCOUNTER (OUTPATIENT)
Dept: LAB | Facility: MEDICAL CENTER | Age: 62
End: 2022-04-15
Attending: INTERNAL MEDICINE
Payer: MEDICARE

## 2022-01-01 ENCOUNTER — HOSPITAL ENCOUNTER (INPATIENT)
Facility: MEDICAL CENTER | Age: 62
LOS: 6 days | DRG: 956 | End: 2022-09-02
Attending: EMERGENCY MEDICINE | Admitting: SURGERY
Payer: MEDICARE

## 2022-01-01 ENCOUNTER — APPOINTMENT (OUTPATIENT)
Dept: RADIOLOGY | Facility: MEDICAL CENTER | Age: 62
DRG: 956 | End: 2022-01-01
Attending: EMERGENCY MEDICINE
Payer: MEDICARE

## 2022-01-01 ENCOUNTER — APPOINTMENT (OUTPATIENT)
Dept: RADIOLOGY | Facility: MEDICAL CENTER | Age: 62
DRG: 085 | End: 2022-01-01
Attending: NURSE PRACTITIONER
Payer: MEDICARE

## 2022-01-01 ENCOUNTER — APPOINTMENT (OUTPATIENT)
Dept: RADIOLOGY | Facility: MEDICAL CENTER | Age: 62
DRG: 563 | End: 2022-01-01
Attending: EMERGENCY MEDICINE
Payer: MEDICARE

## 2022-01-01 ENCOUNTER — ANESTHESIA EVENT (OUTPATIENT)
Dept: SURGERY | Facility: MEDICAL CENTER | Age: 62
DRG: 388 | End: 2022-01-01
Payer: MEDICARE

## 2022-01-01 ENCOUNTER — APPOINTMENT (OUTPATIENT)
Dept: RADIOLOGY | Facility: MEDICAL CENTER | Age: 62
DRG: 917 | End: 2022-01-01
Attending: EMERGENCY MEDICINE
Payer: MEDICARE

## 2022-01-01 ENCOUNTER — APPOINTMENT (OUTPATIENT)
Dept: CARDIOLOGY | Facility: MEDICAL CENTER | Age: 62
DRG: 563 | End: 2022-01-01
Attending: HOSPITALIST
Payer: MEDICARE

## 2022-01-01 ENCOUNTER — ANESTHESIA EVENT (OUTPATIENT)
Dept: SURGERY | Facility: MEDICAL CENTER | Age: 62
DRG: 956 | End: 2022-01-01
Payer: MEDICARE

## 2022-01-01 ENCOUNTER — APPOINTMENT (OUTPATIENT)
Dept: RADIOLOGY | Facility: MEDICAL CENTER | Age: 62
End: 2022-01-01
Attending: INTERNAL MEDICINE
Payer: MEDICARE

## 2022-01-01 ENCOUNTER — HOSPITAL ENCOUNTER (INPATIENT)
Facility: MEDICAL CENTER | Age: 62
LOS: 6 days | DRG: 563 | End: 2022-03-25
Attending: EMERGENCY MEDICINE | Admitting: HOSPITALIST
Payer: MEDICARE

## 2022-01-01 ENCOUNTER — APPOINTMENT (OUTPATIENT)
Dept: RADIOLOGY | Facility: MEDICAL CENTER | Age: 62
End: 2022-01-01
Payer: MEDICARE

## 2022-01-01 ENCOUNTER — HOSPITAL ENCOUNTER (OUTPATIENT)
Dept: LAB | Facility: MEDICAL CENTER | Age: 62
End: 2022-01-04
Attending: INTERNAL MEDICINE
Payer: MEDICARE

## 2022-01-01 ENCOUNTER — HOSPITAL ENCOUNTER (EMERGENCY)
Facility: MEDICAL CENTER | Age: 62
End: 2022-01-03
Payer: MEDICARE

## 2022-01-01 ENCOUNTER — HOSPITAL ENCOUNTER (OUTPATIENT)
Facility: MEDICAL CENTER | Age: 62
End: 2022-01-24
Attending: EMERGENCY MEDICINE | Admitting: STUDENT IN AN ORGANIZED HEALTH CARE EDUCATION/TRAINING PROGRAM
Payer: MEDICARE

## 2022-01-01 ENCOUNTER — HOSPITAL ENCOUNTER (INPATIENT)
Facility: MEDICAL CENTER | Age: 62
LOS: 6 days | DRG: 388 | End: 2022-02-06
Attending: EMERGENCY MEDICINE | Admitting: INTERNAL MEDICINE
Payer: MEDICARE

## 2022-01-01 ENCOUNTER — HOSPITAL ENCOUNTER (EMERGENCY)
Facility: MEDICAL CENTER | Age: 62
End: 2022-10-10
Attending: EMERGENCY MEDICINE
Payer: MEDICARE

## 2022-01-01 ENCOUNTER — APPOINTMENT (OUTPATIENT)
Dept: RADIOLOGY | Facility: MEDICAL CENTER | Age: 62
DRG: 956 | End: 2022-01-01
Attending: NURSE PRACTITIONER
Payer: MEDICARE

## 2022-01-01 ENCOUNTER — APPOINTMENT (OUTPATIENT)
Dept: RADIOLOGY | Facility: MEDICAL CENTER | Age: 62
DRG: 082 | End: 2022-01-01
Attending: CLINICAL NURSE SPECIALIST
Payer: MEDICARE

## 2022-01-01 ENCOUNTER — HOSPITAL ENCOUNTER (EMERGENCY)
Facility: MEDICAL CENTER | Age: 62
End: 2022-09-17
Attending: EMERGENCY MEDICINE
Payer: MEDICARE

## 2022-01-01 ENCOUNTER — TELEPHONE (OUTPATIENT)
Dept: SOCIAL WORK | Facility: CLINIC | Age: 62
End: 2022-01-01
Payer: MEDICARE

## 2022-01-01 ENCOUNTER — HOSPITAL ENCOUNTER (INPATIENT)
Facility: REHABILITATION | Age: 62
End: 2022-01-01
Attending: PHYSICAL MEDICINE & REHABILITATION | Admitting: PHYSICAL MEDICINE & REHABILITATION
Payer: MEDICARE

## 2022-01-01 ENCOUNTER — HOSPITAL ENCOUNTER (EMERGENCY)
Facility: MEDICAL CENTER | Age: 62
End: 2022-05-01
Attending: EMERGENCY MEDICINE
Payer: MEDICARE

## 2022-01-01 ENCOUNTER — ANESTHESIA EVENT (OUTPATIENT)
Dept: SURGERY | Facility: MEDICAL CENTER | Age: 62
End: 2022-01-01
Payer: MEDICARE

## 2022-01-01 ENCOUNTER — OFFICE VISIT (OUTPATIENT)
Dept: CARDIOLOGY | Facility: MEDICAL CENTER | Age: 62
End: 2022-01-01
Payer: MEDICARE

## 2022-01-01 ENCOUNTER — APPOINTMENT (OUTPATIENT)
Dept: RADIOLOGY | Facility: MEDICAL CENTER | Age: 62
End: 2022-01-01
Attending: CLINICAL NURSE SPECIALIST
Payer: MEDICARE

## 2022-01-01 ENCOUNTER — APPOINTMENT (OUTPATIENT)
Dept: RADIOLOGY | Facility: MEDICAL CENTER | Age: 62
DRG: 082 | End: 2022-01-01
Attending: EMERGENCY MEDICINE
Payer: MEDICARE

## 2022-01-01 ENCOUNTER — PATIENT MESSAGE (OUTPATIENT)
Dept: MEDICAL GROUP | Facility: LAB | Age: 62
End: 2022-01-01
Payer: MEDICARE

## 2022-01-01 ENCOUNTER — APPOINTMENT (OUTPATIENT)
Dept: RADIOLOGY | Facility: MEDICAL CENTER | Age: 62
DRG: 388 | End: 2022-01-01
Attending: EMERGENCY MEDICINE
Payer: MEDICARE

## 2022-01-01 ENCOUNTER — HOME CARE VISIT (OUTPATIENT)
Dept: HOME HEALTH SERVICES | Facility: HOME HEALTHCARE | Age: 62
End: 2022-01-01

## 2022-01-01 ENCOUNTER — PRE-ADMISSION TESTING (OUTPATIENT)
Dept: ADMISSIONS | Facility: MEDICAL CENTER | Age: 62
End: 2022-01-01
Attending: INTERNAL MEDICINE
Payer: MEDICARE

## 2022-01-01 ENCOUNTER — HOSPITAL ENCOUNTER (OUTPATIENT)
Dept: RADIOLOGY | Facility: MEDICAL CENTER | Age: 62
End: 2022-08-04
Attending: INTERNAL MEDICINE
Payer: MEDICARE

## 2022-01-01 ENCOUNTER — TELEPHONE (OUTPATIENT)
Dept: HEALTH INFORMATION MANAGEMENT | Facility: OTHER | Age: 62
End: 2022-01-01

## 2022-01-01 ENCOUNTER — HOSPITAL ENCOUNTER (OUTPATIENT)
Dept: RADIOLOGY | Facility: MEDICAL CENTER | Age: 62
End: 2022-09-16
Attending: CLINICAL NURSE SPECIALIST
Payer: MEDICARE

## 2022-01-01 ENCOUNTER — TELEPHONE (OUTPATIENT)
Dept: CARDIOLOGY | Facility: MEDICAL CENTER | Age: 62
End: 2022-01-01
Payer: MEDICARE

## 2022-01-01 ENCOUNTER — ANESTHESIA (OUTPATIENT)
Dept: SURGERY | Facility: MEDICAL CENTER | Age: 62
DRG: 388 | End: 2022-01-01
Payer: MEDICARE

## 2022-01-01 ENCOUNTER — OFFICE VISIT (OUTPATIENT)
Dept: SLEEP MEDICINE | Facility: MEDICAL CENTER | Age: 62
End: 2022-01-01
Payer: MEDICARE

## 2022-01-01 ENCOUNTER — HOSPITAL ENCOUNTER (OUTPATIENT)
Facility: MEDICAL CENTER | Age: 62
End: 2022-06-21
Attending: INTERNAL MEDICINE | Admitting: INTERNAL MEDICINE
Payer: MEDICARE

## 2022-01-01 ENCOUNTER — PATIENT OUTREACH (OUTPATIENT)
Dept: SCHEDULING | Facility: IMAGING CENTER | Age: 62
End: 2022-01-01
Payer: MEDICARE

## 2022-01-01 ENCOUNTER — HOSPITAL ENCOUNTER (INPATIENT)
Facility: MEDICAL CENTER | Age: 62
LOS: 5 days | DRG: 085 | End: 2022-10-03
Attending: EMERGENCY MEDICINE | Admitting: SURGERY
Payer: MEDICARE

## 2022-01-01 ENCOUNTER — HOSPITAL ENCOUNTER (INPATIENT)
Facility: MEDICAL CENTER | Age: 62
LOS: 1 days | DRG: 082 | End: 2022-10-30
Attending: EMERGENCY MEDICINE | Admitting: SURGERY
Payer: MEDICARE

## 2022-01-01 ENCOUNTER — HOSPITAL ENCOUNTER (INPATIENT)
Facility: MEDICAL CENTER | Age: 62
LOS: 2 days | DRG: 917 | End: 2022-10-25
Attending: EMERGENCY MEDICINE | Admitting: STUDENT IN AN ORGANIZED HEALTH CARE EDUCATION/TRAINING PROGRAM
Payer: MEDICARE

## 2022-01-01 ENCOUNTER — PATIENT OUTREACH (OUTPATIENT)
Dept: HEALTH INFORMATION MANAGEMENT | Facility: OTHER | Age: 62
End: 2022-01-01

## 2022-01-01 ENCOUNTER — HOSPITAL ENCOUNTER (OUTPATIENT)
Dept: LAB | Facility: MEDICAL CENTER | Age: 62
End: 2022-01-04
Attending: STUDENT IN AN ORGANIZED HEALTH CARE EDUCATION/TRAINING PROGRAM
Payer: MEDICARE

## 2022-01-01 ENCOUNTER — APPOINTMENT (OUTPATIENT)
Dept: RADIOLOGY | Facility: IMAGING CENTER | Age: 62
End: 2022-01-01
Attending: NURSE PRACTITIONER
Payer: MEDICARE

## 2022-01-01 ENCOUNTER — PATIENT MESSAGE (OUTPATIENT)
Dept: HEALTH INFORMATION MANAGEMENT | Facility: OTHER | Age: 62
End: 2022-01-01

## 2022-01-01 ENCOUNTER — APPOINTMENT (OUTPATIENT)
Dept: RADIOLOGY | Facility: MEDICAL CENTER | Age: 62
DRG: 956 | End: 2022-01-01
Attending: SURGERY
Payer: MEDICARE

## 2022-01-01 ENCOUNTER — PATIENT OUTREACH (OUTPATIENT)
Dept: HEALTH INFORMATION MANAGEMENT | Facility: OTHER | Age: 62
End: 2022-01-01
Payer: MEDICARE

## 2022-01-01 ENCOUNTER — HOSPITAL ENCOUNTER (EMERGENCY)
Facility: MEDICAL CENTER | Age: 62
End: 2022-06-06
Attending: EMERGENCY MEDICINE
Payer: MEDICARE

## 2022-01-01 ENCOUNTER — OFFICE VISIT (OUTPATIENT)
Dept: NEUROLOGY | Facility: MEDICAL CENTER | Age: 62
End: 2022-01-01
Attending: PSYCHIATRY & NEUROLOGY
Payer: MEDICARE

## 2022-01-01 ENCOUNTER — ANESTHESIA (OUTPATIENT)
Dept: SURGERY | Facility: MEDICAL CENTER | Age: 62
DRG: 956 | End: 2022-01-01
Payer: MEDICARE

## 2022-01-01 ENCOUNTER — TELEPHONE (OUTPATIENT)
Dept: SLEEP MEDICINE | Facility: MEDICAL CENTER | Age: 62
End: 2022-01-01
Payer: MEDICARE

## 2022-01-01 ENCOUNTER — HOSPITAL ENCOUNTER (INPATIENT)
Facility: MEDICAL CENTER | Age: 62
LOS: 4 days | DRG: 100 | End: 2022-06-02
Attending: EMERGENCY MEDICINE | Admitting: HOSPITALIST
Payer: MEDICARE

## 2022-01-01 ENCOUNTER — OFFICE VISIT (OUTPATIENT)
Dept: URGENT CARE | Facility: CLINIC | Age: 62
End: 2022-01-01

## 2022-01-01 VITALS
WEIGHT: 127.87 LBS | DIASTOLIC BLOOD PRESSURE: 62 MMHG | OXYGEN SATURATION: 99 % | SYSTOLIC BLOOD PRESSURE: 126 MMHG | HEIGHT: 69 IN | BODY MASS INDEX: 18.94 KG/M2 | TEMPERATURE: 98.6 F | HEART RATE: 82 BPM

## 2022-01-01 VITALS
OXYGEN SATURATION: 49 % | HEART RATE: 37 BPM | HEIGHT: 69 IN | DIASTOLIC BLOOD PRESSURE: 22 MMHG | WEIGHT: 118.83 LBS | TEMPERATURE: 97.2 F | SYSTOLIC BLOOD PRESSURE: 48 MMHG | BODY MASS INDEX: 17.6 KG/M2

## 2022-01-01 VITALS
DIASTOLIC BLOOD PRESSURE: 78 MMHG | TEMPERATURE: 98.3 F | RESPIRATION RATE: 16 BRPM | HEART RATE: 80 BPM | OXYGEN SATURATION: 97 % | WEIGHT: 128 LBS | SYSTOLIC BLOOD PRESSURE: 124 MMHG | HEIGHT: 69 IN | BODY MASS INDEX: 18.96 KG/M2

## 2022-01-01 VITALS
DIASTOLIC BLOOD PRESSURE: 56 MMHG | HEART RATE: 83 BPM | RESPIRATION RATE: 14 BRPM | BODY MASS INDEX: 17.25 KG/M2 | TEMPERATURE: 98.2 F | SYSTOLIC BLOOD PRESSURE: 102 MMHG | WEIGHT: 116.84 LBS | OXYGEN SATURATION: 96 %

## 2022-01-01 VITALS
WEIGHT: 130.07 LBS | DIASTOLIC BLOOD PRESSURE: 58 MMHG | SYSTOLIC BLOOD PRESSURE: 108 MMHG | OXYGEN SATURATION: 99 % | RESPIRATION RATE: 18 BRPM | BODY MASS INDEX: 19.27 KG/M2 | TEMPERATURE: 98.1 F | HEIGHT: 69 IN | HEART RATE: 75 BPM

## 2022-01-01 VITALS
HEART RATE: 98 BPM | BODY MASS INDEX: 17.92 KG/M2 | DIASTOLIC BLOOD PRESSURE: 58 MMHG | RESPIRATION RATE: 14 BRPM | OXYGEN SATURATION: 94 % | HEIGHT: 69 IN | WEIGHT: 121 LBS | SYSTOLIC BLOOD PRESSURE: 80 MMHG

## 2022-01-01 VITALS
WEIGHT: 132 LBS | RESPIRATION RATE: 18 BRPM | OXYGEN SATURATION: 98 % | TEMPERATURE: 97.6 F | HEART RATE: 78 BPM | SYSTOLIC BLOOD PRESSURE: 110 MMHG | BODY MASS INDEX: 19.55 KG/M2 | DIASTOLIC BLOOD PRESSURE: 70 MMHG | HEIGHT: 69 IN

## 2022-01-01 VITALS
DIASTOLIC BLOOD PRESSURE: 68 MMHG | TEMPERATURE: 97.1 F | HEART RATE: 63 BPM | OXYGEN SATURATION: 100 % | RESPIRATION RATE: 18 BRPM | BODY MASS INDEX: 21.98 KG/M2 | SYSTOLIC BLOOD PRESSURE: 125 MMHG | WEIGHT: 148.37 LBS | HEIGHT: 69 IN

## 2022-01-01 VITALS
HEART RATE: 86 BPM | RESPIRATION RATE: 15 BRPM | OXYGEN SATURATION: 95 % | HEIGHT: 69 IN | SYSTOLIC BLOOD PRESSURE: 118 MMHG | DIASTOLIC BLOOD PRESSURE: 70 MMHG | WEIGHT: 132.2 LBS | BODY MASS INDEX: 19.58 KG/M2

## 2022-01-01 VITALS
HEIGHT: 69 IN | OXYGEN SATURATION: 98 % | BODY MASS INDEX: 19.21 KG/M2 | TEMPERATURE: 98.6 F | HEART RATE: 76 BPM | RESPIRATION RATE: 17 BRPM | DIASTOLIC BLOOD PRESSURE: 67 MMHG | SYSTOLIC BLOOD PRESSURE: 131 MMHG

## 2022-01-01 VITALS
WEIGHT: 138 LBS | TEMPERATURE: 97.8 F | SYSTOLIC BLOOD PRESSURE: 118 MMHG | HEART RATE: 72 BPM | RESPIRATION RATE: 12 BRPM | BODY MASS INDEX: 20.44 KG/M2 | DIASTOLIC BLOOD PRESSURE: 64 MMHG | OXYGEN SATURATION: 94 % | HEIGHT: 69 IN

## 2022-01-01 VITALS
BODY MASS INDEX: 21.55 KG/M2 | HEIGHT: 69 IN | HEART RATE: 80 BPM | OXYGEN SATURATION: 98 % | DIASTOLIC BLOOD PRESSURE: 66 MMHG | RESPIRATION RATE: 18 BRPM | WEIGHT: 145.5 LBS | TEMPERATURE: 97.5 F | SYSTOLIC BLOOD PRESSURE: 127 MMHG

## 2022-01-01 VITALS
DIASTOLIC BLOOD PRESSURE: 66 MMHG | HEART RATE: 75 BPM | SYSTOLIC BLOOD PRESSURE: 113 MMHG | RESPIRATION RATE: 18 BRPM | WEIGHT: 115 LBS | TEMPERATURE: 99 F | HEIGHT: 69 IN | BODY MASS INDEX: 17.03 KG/M2 | OXYGEN SATURATION: 93 %

## 2022-01-01 VITALS
SYSTOLIC BLOOD PRESSURE: 159 MMHG | TEMPERATURE: 36.8 F | HEIGHT: 69 IN | DIASTOLIC BLOOD PRESSURE: 74 MMHG | HEART RATE: 60 BPM | OXYGEN SATURATION: 99 % | WEIGHT: 126 LBS | RESPIRATION RATE: 14 BRPM | BODY MASS INDEX: 18.66 KG/M2

## 2022-01-01 VITALS
SYSTOLIC BLOOD PRESSURE: 135 MMHG | WEIGHT: 124 LBS | RESPIRATION RATE: 13 BRPM | TEMPERATURE: 98.1 F | HEART RATE: 81 BPM | HEIGHT: 69 IN | BODY MASS INDEX: 18.37 KG/M2 | OXYGEN SATURATION: 97 % | DIASTOLIC BLOOD PRESSURE: 69 MMHG

## 2022-01-01 VITALS
HEART RATE: 86 BPM | OXYGEN SATURATION: 98 % | RESPIRATION RATE: 18 BRPM | TEMPERATURE: 98.7 F | DIASTOLIC BLOOD PRESSURE: 71 MMHG | HEIGHT: 69 IN | BODY MASS INDEX: 17.6 KG/M2 | WEIGHT: 118.83 LBS | SYSTOLIC BLOOD PRESSURE: 121 MMHG

## 2022-01-01 VITALS
SYSTOLIC BLOOD PRESSURE: 110 MMHG | DIASTOLIC BLOOD PRESSURE: 60 MMHG | OXYGEN SATURATION: 96 % | HEIGHT: 69 IN | HEART RATE: 83 BPM | WEIGHT: 138.44 LBS | BODY MASS INDEX: 20.51 KG/M2

## 2022-01-01 VITALS
SYSTOLIC BLOOD PRESSURE: 84 MMHG | HEART RATE: 77 BPM | BODY MASS INDEX: 17.47 KG/M2 | HEIGHT: 69 IN | OXYGEN SATURATION: 99 % | RESPIRATION RATE: 15 BRPM | TEMPERATURE: 99.4 F | DIASTOLIC BLOOD PRESSURE: 52 MMHG | WEIGHT: 117.95 LBS

## 2022-01-01 VITALS
HEIGHT: 69 IN | WEIGHT: 119 LBS | OXYGEN SATURATION: 96 % | RESPIRATION RATE: 12 BRPM | BODY MASS INDEX: 17.63 KG/M2 | DIASTOLIC BLOOD PRESSURE: 50 MMHG | TEMPERATURE: 98.1 F | HEART RATE: 87 BPM | SYSTOLIC BLOOD PRESSURE: 98 MMHG

## 2022-01-01 VITALS
DIASTOLIC BLOOD PRESSURE: 64 MMHG | TEMPERATURE: 98 F | SYSTOLIC BLOOD PRESSURE: 110 MMHG | HEIGHT: 69 IN | OXYGEN SATURATION: 100 % | BODY MASS INDEX: 18.81 KG/M2 | RESPIRATION RATE: 12 BRPM | WEIGHT: 127 LBS | HEART RATE: 72 BPM

## 2022-01-01 VITALS
TEMPERATURE: 97.3 F | WEIGHT: 130.07 LBS | HEIGHT: 69 IN | SYSTOLIC BLOOD PRESSURE: 114 MMHG | HEART RATE: 71 BPM | BODY MASS INDEX: 19.27 KG/M2 | RESPIRATION RATE: 18 BRPM | OXYGEN SATURATION: 98 % | DIASTOLIC BLOOD PRESSURE: 77 MMHG

## 2022-01-01 VITALS
WEIGHT: 128 LBS | DIASTOLIC BLOOD PRESSURE: 60 MMHG | HEIGHT: 69 IN | OXYGEN SATURATION: 97 % | TEMPERATURE: 97.5 F | SYSTOLIC BLOOD PRESSURE: 100 MMHG | RESPIRATION RATE: 12 BRPM | HEART RATE: 90 BPM | BODY MASS INDEX: 18.96 KG/M2

## 2022-01-01 VITALS
OXYGEN SATURATION: 96 % | SYSTOLIC BLOOD PRESSURE: 120 MMHG | HEIGHT: 69 IN | DIASTOLIC BLOOD PRESSURE: 73 MMHG | WEIGHT: 130 LBS | RESPIRATION RATE: 17 BRPM | TEMPERATURE: 97.4 F | BODY MASS INDEX: 19.26 KG/M2 | HEART RATE: 62 BPM

## 2022-01-01 VITALS
TEMPERATURE: 98.2 F | HEIGHT: 69 IN | BODY MASS INDEX: 17.18 KG/M2 | SYSTOLIC BLOOD PRESSURE: 99 MMHG | DIASTOLIC BLOOD PRESSURE: 55 MMHG | RESPIRATION RATE: 13 BRPM | HEART RATE: 74 BPM | OXYGEN SATURATION: 99 % | WEIGHT: 116 LBS

## 2022-01-01 VITALS
RESPIRATION RATE: 12 BRPM | HEART RATE: 73 BPM | SYSTOLIC BLOOD PRESSURE: 80 MMHG | DIASTOLIC BLOOD PRESSURE: 50 MMHG | TEMPERATURE: 73 F | OXYGEN SATURATION: 99 % | HEIGHT: 69 IN | BODY MASS INDEX: 17.33 KG/M2 | WEIGHT: 117 LBS

## 2022-01-01 VITALS
OXYGEN SATURATION: 99 % | HEIGHT: 69 IN | SYSTOLIC BLOOD PRESSURE: 106 MMHG | DIASTOLIC BLOOD PRESSURE: 98 MMHG | BODY MASS INDEX: 20.73 KG/M2 | HEART RATE: 71 BPM | RESPIRATION RATE: 12 BRPM | WEIGHT: 140 LBS

## 2022-01-01 VITALS
HEART RATE: 64 BPM | DIASTOLIC BLOOD PRESSURE: 58 MMHG | SYSTOLIC BLOOD PRESSURE: 100 MMHG | RESPIRATION RATE: 18 BRPM | WEIGHT: 130 LBS | OXYGEN SATURATION: 97 % | TEMPERATURE: 98.9 F | BODY MASS INDEX: 19.26 KG/M2 | HEIGHT: 69 IN

## 2022-01-01 VITALS
TEMPERATURE: 98 F | HEART RATE: 68 BPM | SYSTOLIC BLOOD PRESSURE: 139 MMHG | WEIGHT: 132.28 LBS | RESPIRATION RATE: 18 BRPM | BODY MASS INDEX: 19.59 KG/M2 | HEIGHT: 69 IN | OXYGEN SATURATION: 94 % | DIASTOLIC BLOOD PRESSURE: 65 MMHG

## 2022-01-01 VITALS
RESPIRATION RATE: 18 BRPM | SYSTOLIC BLOOD PRESSURE: 119 MMHG | DIASTOLIC BLOOD PRESSURE: 62 MMHG | HEART RATE: 68 BPM | BODY MASS INDEX: 19.33 KG/M2 | HEIGHT: 69 IN | TEMPERATURE: 97.5 F | OXYGEN SATURATION: 95 % | WEIGHT: 130.51 LBS

## 2022-01-01 VITALS
OXYGEN SATURATION: 94 % | TEMPERATURE: 98.6 F | RESPIRATION RATE: 16 BRPM | BODY MASS INDEX: 21.48 KG/M2 | WEIGHT: 145 LBS | HEART RATE: 91 BPM | HEIGHT: 69 IN | DIASTOLIC BLOOD PRESSURE: 95 MMHG | SYSTOLIC BLOOD PRESSURE: 144 MMHG

## 2022-01-01 VITALS
RESPIRATION RATE: 18 BRPM | BODY MASS INDEX: 19 KG/M2 | DIASTOLIC BLOOD PRESSURE: 75 MMHG | TEMPERATURE: 97.8 F | HEIGHT: 69 IN | OXYGEN SATURATION: 98 % | SYSTOLIC BLOOD PRESSURE: 118 MMHG | WEIGHT: 128.31 LBS | HEART RATE: 70 BPM

## 2022-01-01 DIAGNOSIS — S06.5XAA SDH (SUBDURAL HEMATOMA) (HCC): ICD-10-CM

## 2022-01-01 DIAGNOSIS — F41.9 RECURRENT MILD MAJOR DEPRESSIVE DISORDER WITH ANXIETY (HCC): ICD-10-CM

## 2022-01-01 DIAGNOSIS — S42.291A HUMERAL HEAD FRACTURE, RIGHT, CLOSED, INITIAL ENCOUNTER: ICD-10-CM

## 2022-01-01 DIAGNOSIS — F33.40 RECURRENT MAJOR DEPRESSIVE DISORDER, IN REMISSION (HCC): ICD-10-CM

## 2022-01-01 DIAGNOSIS — T50.904A DRUG OVERDOSE OF UNDETERMINED INTENT, INITIAL ENCOUNTER: Primary | ICD-10-CM

## 2022-01-01 DIAGNOSIS — T38.0X5A STEROID-INDUCED HYPERGLYCEMIA: ICD-10-CM

## 2022-01-01 DIAGNOSIS — S42.201A CLOSED FRACTURE OF PROXIMAL END OF RIGHT HUMERUS, UNSPECIFIED FRACTURE MORPHOLOGY, INITIAL ENCOUNTER: ICD-10-CM

## 2022-01-01 DIAGNOSIS — G93.40 ENCEPHALOPATHY ACUTE: ICD-10-CM

## 2022-01-01 DIAGNOSIS — D84.9 IMMUNOCOMPROMISED STATE (HCC): ICD-10-CM

## 2022-01-01 DIAGNOSIS — I27.0 PRIMARY PULMONARY HYPERTENSION (HCC): ICD-10-CM

## 2022-01-01 DIAGNOSIS — F03.90 MAJOR NEUROCOGNITIVE DISORDER (HCC): ICD-10-CM

## 2022-01-01 DIAGNOSIS — R19.7 NAUSEA VOMITING AND DIARRHEA: ICD-10-CM

## 2022-01-01 DIAGNOSIS — Z94.4 STATUS POST LIVER TRANSPLANT (HCC): ICD-10-CM

## 2022-01-01 DIAGNOSIS — Z87.01 HISTORY OF PNEUMONIA: ICD-10-CM

## 2022-01-01 DIAGNOSIS — D61.818 PANCYTOPENIA (HCC): ICD-10-CM

## 2022-01-01 DIAGNOSIS — E87.79 OTHER HYPERVOLEMIA: ICD-10-CM

## 2022-01-01 DIAGNOSIS — E87.6 HYPOKALEMIA: ICD-10-CM

## 2022-01-01 DIAGNOSIS — E87.20 LACTIC ACIDOSIS: ICD-10-CM

## 2022-01-01 DIAGNOSIS — K76.81 HEPATOPULMONARY SYNDROME (HCC): ICD-10-CM

## 2022-01-01 DIAGNOSIS — F02.80 MAJOR NEUROCOGNITIVE DISORDER AS LATE EFFECT OF TRAUMATIC BRAIN INJURY WITHOUT BEHAVIORAL DISTURBANCE (HCC): ICD-10-CM

## 2022-01-01 DIAGNOSIS — M80.00XS AGE-RELATED OSTEOPOROSIS WITH CURRENT PATHOLOGICAL FRACTURE, SEQUELA: ICD-10-CM

## 2022-01-01 DIAGNOSIS — E78.00 PURE HYPERCHOLESTEROLEMIA: ICD-10-CM

## 2022-01-01 DIAGNOSIS — Z53.09 CONTRAINDICATION TO DEEP VEIN THROMBOSIS (DVT) PROPHYLAXIS: ICD-10-CM

## 2022-01-01 DIAGNOSIS — R53.81 DEBILITY: ICD-10-CM

## 2022-01-01 DIAGNOSIS — F10.10 ALCOHOL ABUSE: ICD-10-CM

## 2022-01-01 DIAGNOSIS — R11.0 NAUSEA: ICD-10-CM

## 2022-01-01 DIAGNOSIS — R11.2 NAUSEA VOMITING AND DIARRHEA: ICD-10-CM

## 2022-01-01 DIAGNOSIS — R41.89 COGNITIVE CHANGE: ICD-10-CM

## 2022-01-01 DIAGNOSIS — M25.531 RIGHT WRIST PAIN: ICD-10-CM

## 2022-01-01 DIAGNOSIS — F41.9 ANXIETY: ICD-10-CM

## 2022-01-01 DIAGNOSIS — G40.909 SEIZURE DISORDER (HCC): ICD-10-CM

## 2022-01-01 DIAGNOSIS — R11.2 NAUSEA AND VOMITING, INTRACTABILITY OF VOMITING NOT SPECIFIED, UNSPECIFIED VOMITING TYPE: ICD-10-CM

## 2022-01-01 DIAGNOSIS — E83.42 HYPOMAGNESEMIA: ICD-10-CM

## 2022-01-01 DIAGNOSIS — R56.9 SEIZURES (HCC): ICD-10-CM

## 2022-01-01 DIAGNOSIS — Z87.01 HISTORY OF ASPIRATION PNEUMONIA: ICD-10-CM

## 2022-01-01 DIAGNOSIS — G89.4 CHRONIC PAIN SYNDROME: ICD-10-CM

## 2022-01-01 DIAGNOSIS — I34.0 MILD MITRAL REGURGITATION: ICD-10-CM

## 2022-01-01 DIAGNOSIS — G89.29 CHRONIC RIGHT SHOULDER PAIN: ICD-10-CM

## 2022-01-01 DIAGNOSIS — R51.9 NONINTRACTABLE HEADACHE, UNSPECIFIED CHRONICITY PATTERN, UNSPECIFIED HEADACHE TYPE: ICD-10-CM

## 2022-01-01 DIAGNOSIS — E44.0 MODERATE PROTEIN-CALORIE MALNUTRITION (HCC): ICD-10-CM

## 2022-01-01 DIAGNOSIS — E87.0 HYPERNATREMIA: ICD-10-CM

## 2022-01-01 DIAGNOSIS — I35.1 MILD AORTIC REGURGITATION: ICD-10-CM

## 2022-01-01 DIAGNOSIS — M25.521 RIGHT ELBOW PAIN: ICD-10-CM

## 2022-01-01 DIAGNOSIS — R09.02 HYPOXIA: ICD-10-CM

## 2022-01-01 DIAGNOSIS — S06.5X0D SUBDURAL HEMORRHAGE FOLLOWING INJURY, WITH OPEN INTRACRANIAL WOUND, WITH NO LOSS OF CONSCIOUSNESS, SUBSEQUENT ENCOUNTER: ICD-10-CM

## 2022-01-01 DIAGNOSIS — R16.1 SPLENOMEGALY: ICD-10-CM

## 2022-01-01 DIAGNOSIS — F33.0 RECURRENT MILD MAJOR DEPRESSIVE DISORDER WITH ANXIETY (HCC): ICD-10-CM

## 2022-01-01 DIAGNOSIS — D64.9 ANEMIA, UNSPECIFIED TYPE: ICD-10-CM

## 2022-01-01 DIAGNOSIS — Z98.84 S/P BARIATRIC SURGERY: ICD-10-CM

## 2022-01-01 DIAGNOSIS — M25.559 HIP PAIN: ICD-10-CM

## 2022-01-01 DIAGNOSIS — R06.02 SOB (SHORTNESS OF BREATH): ICD-10-CM

## 2022-01-01 DIAGNOSIS — F41.1 GENERALIZED ANXIETY DISORDER: ICD-10-CM

## 2022-01-01 DIAGNOSIS — E86.0 DEHYDRATION: ICD-10-CM

## 2022-01-01 DIAGNOSIS — S01.01XA SCALP LACERATION, INITIAL ENCOUNTER: ICD-10-CM

## 2022-01-01 DIAGNOSIS — Z48.02 ENCOUNTER FOR STAPLE REMOVAL: ICD-10-CM

## 2022-01-01 DIAGNOSIS — K59.01 SLOW TRANSIT CONSTIPATION: ICD-10-CM

## 2022-01-01 DIAGNOSIS — S01.90XD SUBDURAL HEMORRHAGE FOLLOWING INJURY, WITH OPEN INTRACRANIAL WOUND, WITH NO LOSS OF CONSCIOUSNESS, SUBSEQUENT ENCOUNTER: ICD-10-CM

## 2022-01-01 DIAGNOSIS — G40.909 SEIZURE DISORDER (HCC): Primary | ICD-10-CM

## 2022-01-01 DIAGNOSIS — R73.9 STEROID-INDUCED HYPERGLYCEMIA: ICD-10-CM

## 2022-01-01 DIAGNOSIS — S42.401D CLOSED FRACTURE OF RIGHT ELBOW WITH ROUTINE HEALING: ICD-10-CM

## 2022-01-01 DIAGNOSIS — S42.224D CLOSED 2-PART NONDISPLACED FRACTURE OF SURGICAL NECK OF RIGHT HUMERUS WITH ROUTINE HEALING, SUBSEQUENT ENCOUNTER: ICD-10-CM

## 2022-01-01 DIAGNOSIS — G89.29 CHRONIC MIDLINE LOW BACK PAIN, UNSPECIFIED WHETHER SCIATICA PRESENT: ICD-10-CM

## 2022-01-01 DIAGNOSIS — G43.719 CHRONIC MIGRAINE WITHOUT AURA, INTRACTABLE, WITHOUT STATUS MIGRAINOSUS: ICD-10-CM

## 2022-01-01 DIAGNOSIS — R73.9 HYPERGLYCEMIA: ICD-10-CM

## 2022-01-01 DIAGNOSIS — F42.2 MIXED OBSESSIONAL THOUGHTS AND ACTS: ICD-10-CM

## 2022-01-01 DIAGNOSIS — S72.001A CLOSED DISPLACED FRACTURE OF RIGHT FEMORAL NECK (HCC): ICD-10-CM

## 2022-01-01 DIAGNOSIS — G93.40 ACUTE ENCEPHALOPATHY: ICD-10-CM

## 2022-01-01 DIAGNOSIS — Q21.11 OSTIUM SECUNDUM TYPE ATRIAL SEPTAL DEFECT: ICD-10-CM

## 2022-01-01 DIAGNOSIS — R19.7 DIARRHEA, UNSPECIFIED TYPE: ICD-10-CM

## 2022-01-01 DIAGNOSIS — M54.50 CHRONIC MIDLINE LOW BACK PAIN, UNSPECIFIED WHETHER SCIATICA PRESENT: ICD-10-CM

## 2022-01-01 DIAGNOSIS — S69.92XA INJURY OF LEFT HAND, INITIAL ENCOUNTER: ICD-10-CM

## 2022-01-01 DIAGNOSIS — K52.9 ENTERITIS: ICD-10-CM

## 2022-01-01 DIAGNOSIS — F51.01 PRIMARY INSOMNIA: ICD-10-CM

## 2022-01-01 DIAGNOSIS — J96.11 CHRONIC RESPIRATORY FAILURE WITH HYPOXIA (HCC): ICD-10-CM

## 2022-01-01 DIAGNOSIS — K21.00 GASTROESOPHAGEAL REFLUX DISEASE WITH ESOPHAGITIS: ICD-10-CM

## 2022-01-01 DIAGNOSIS — Z79.899 HIGH RISK MEDICATION USE: ICD-10-CM

## 2022-01-01 DIAGNOSIS — Z87.19 HISTORY OF SMALL BOWEL OBSTRUCTION: ICD-10-CM

## 2022-01-01 DIAGNOSIS — D73.89 SPLENIC LESION: ICD-10-CM

## 2022-01-01 DIAGNOSIS — R10.84 GENERALIZED ABDOMINAL PAIN: ICD-10-CM

## 2022-01-01 DIAGNOSIS — E27.40 ADRENAL INSUFFICIENCY (HCC): ICD-10-CM

## 2022-01-01 DIAGNOSIS — R09.02 HYPOXEMIA: ICD-10-CM

## 2022-01-01 DIAGNOSIS — S06.5XAA BILATERAL SUBDURAL HEMATOMAS (HCC): Primary | ICD-10-CM

## 2022-01-01 DIAGNOSIS — I25.2 H/O NON-ST ELEVATION MYOCARDIAL INFARCTION (NSTEMI): ICD-10-CM

## 2022-01-01 DIAGNOSIS — Z87.898 HISTORY OF SEIZURES: ICD-10-CM

## 2022-01-01 DIAGNOSIS — D86.9 SARCOIDOSIS: ICD-10-CM

## 2022-01-01 DIAGNOSIS — S06.5X1A POST-TRAUMATIC SUBDURAL HEMATOMA, WITH LOSS OF CONSCIOUSNESS OF 30 MINUTES OR LESS, INITIAL ENCOUNTER (HCC): ICD-10-CM

## 2022-01-01 DIAGNOSIS — J84.9 INTERSTITIAL LUNG DISEASE (HCC): ICD-10-CM

## 2022-01-01 DIAGNOSIS — R29.6 FREQUENT FALLS: ICD-10-CM

## 2022-01-01 DIAGNOSIS — Z86.19 HISTORY OF CLOSTRIDIUM DIFFICILE INFECTION: ICD-10-CM

## 2022-01-01 DIAGNOSIS — S06.5XAA SUBDURAL HEMATOMA (HCC): ICD-10-CM

## 2022-01-01 DIAGNOSIS — R11.2 NAUSEA AND VOMITING: ICD-10-CM

## 2022-01-01 DIAGNOSIS — R55 SYNCOPE, UNSPECIFIED SYNCOPE TYPE: ICD-10-CM

## 2022-01-01 DIAGNOSIS — R65.10 SIRS (SYSTEMIC INFLAMMATORY RESPONSE SYNDROME) (HCC): ICD-10-CM

## 2022-01-01 DIAGNOSIS — S06.5X0A TRAUMATIC SUBDURAL HEMATOMA WITHOUT LOSS OF CONSCIOUSNESS, INITIAL ENCOUNTER (HCC): ICD-10-CM

## 2022-01-01 DIAGNOSIS — M25.511 CHRONIC RIGHT SHOULDER PAIN: ICD-10-CM

## 2022-01-01 DIAGNOSIS — Z79.899 CHRONIC PRESCRIPTION BENZODIAZEPINE USE: ICD-10-CM

## 2022-01-01 DIAGNOSIS — R53.83 LETHARGY: ICD-10-CM

## 2022-01-01 DIAGNOSIS — R10.30 LOWER ABDOMINAL PAIN: ICD-10-CM

## 2022-01-01 DIAGNOSIS — N28.9 RENAL INSUFFICIENCY: ICD-10-CM

## 2022-01-01 DIAGNOSIS — D69.6 THROMBOCYTOPENIA (HCC): ICD-10-CM

## 2022-01-01 DIAGNOSIS — R13.10 DYSPHAGIA, UNSPECIFIED TYPE: ICD-10-CM

## 2022-01-01 DIAGNOSIS — S06.9XAS MAJOR NEUROCOGNITIVE DISORDER AS LATE EFFECT OF TRAUMATIC BRAIN INJURY WITHOUT BEHAVIORAL DISTURBANCE (HCC): ICD-10-CM

## 2022-01-01 DIAGNOSIS — R33.9 URINARY RETENTION: ICD-10-CM

## 2022-01-01 DIAGNOSIS — R35.89 POLYURIA: ICD-10-CM

## 2022-01-01 DIAGNOSIS — Z78.9 ALCOHOL USE: ICD-10-CM

## 2022-01-01 DIAGNOSIS — K52.9 COLITIS: ICD-10-CM
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
## 2022-01-01 DIAGNOSIS — R42 DIZZINESS: ICD-10-CM

## 2022-01-01 DIAGNOSIS — S72.001A DISPLACED FRACTURE OF RIGHT FEMORAL NECK (HCC): ICD-10-CM

## 2022-01-01 DIAGNOSIS — R41.89 COGNITIVE IMPAIRMENT: Primary | ICD-10-CM

## 2022-01-01 DIAGNOSIS — R51.9 NONINTRACTABLE EPISODIC HEADACHE, UNSPECIFIED HEADACHE TYPE: ICD-10-CM

## 2022-01-01 DIAGNOSIS — R11.11 VOMITING WITHOUT NAUSEA, INTRACTABILITY OF VOMITING NOT SPECIFIED, UNSPECIFIED VOMITING TYPE: ICD-10-CM

## 2022-01-01 DIAGNOSIS — K59.04 CHRONIC IDIOPATHIC CONSTIPATION: ICD-10-CM

## 2022-01-01 DIAGNOSIS — T50.902A SUICIDE ATTEMPT BY DRUG OVERDOSE (HCC): ICD-10-CM

## 2022-01-01 DIAGNOSIS — D69.1 PLATELET DYSFUNCTION (HCC): ICD-10-CM

## 2022-01-01 DIAGNOSIS — E83.51 HYPOCALCEMIA: ICD-10-CM

## 2022-01-01 DIAGNOSIS — R10.31 INGUINAL PAIN, RIGHT: ICD-10-CM

## 2022-01-01 DIAGNOSIS — R10.9 ABDOMINAL PAIN, UNSPECIFIED ABDOMINAL LOCATION: ICD-10-CM

## 2022-01-01 DIAGNOSIS — D47.2 MGUS (MONOCLONAL GAMMOPATHY OF UNKNOWN SIGNIFICANCE): ICD-10-CM

## 2022-01-01 DIAGNOSIS — R56.9 SEIZURE (HCC): ICD-10-CM

## 2022-01-01 DIAGNOSIS — I60.9 SUBARACHNOID HEMORRHAGE (HCC): ICD-10-CM

## 2022-01-01 DIAGNOSIS — Z86.19 HISTORY OF INFECTION WITH VANCOMYCIN RESISTANT ENTEROCOCCUS (VRE): ICD-10-CM

## 2022-01-01 DIAGNOSIS — Z87.19 HISTORY OF PANCREATITIS: ICD-10-CM

## 2022-01-01 DIAGNOSIS — S60.221A CONTUSION OF RIGHT HAND, INITIAL ENCOUNTER: ICD-10-CM

## 2022-01-01 DIAGNOSIS — K43.9 VENTRAL HERNIA WITHOUT OBSTRUCTION OR GANGRENE: ICD-10-CM

## 2022-01-01 DIAGNOSIS — T14.90XA TRAUMA: ICD-10-CM

## 2022-01-01 DIAGNOSIS — A04.5 CAMPYLOBACTER GASTROENTERITIS: ICD-10-CM

## 2022-01-01 DIAGNOSIS — K43.2 INCISIONAL HERNIA, WITHOUT OBSTRUCTION OR GANGRENE: ICD-10-CM

## 2022-01-01 DIAGNOSIS — F11.29 OPIOID DEPENDENCE WITH OPIOID-INDUCED DISORDER (HCC): ICD-10-CM

## 2022-01-01 DIAGNOSIS — S42.354D CLOSED NONDISPLACED COMMINUTED FRACTURE OF SHAFT OF RIGHT HUMERUS WITH ROUTINE HEALING, SUBSEQUENT ENCOUNTER: ICD-10-CM

## 2022-01-01 DIAGNOSIS — R04.0 EPISTAXIS: ICD-10-CM

## 2022-01-01 DIAGNOSIS — S06.5X9A POST-TRAUMATIC SUBDURAL HEMATOMA WITH LOSS OF CONSCIOUSNESS, INITIAL ENCOUNTER (HCC): ICD-10-CM

## 2022-01-01 DIAGNOSIS — J96.21 ACUTE ON CHRONIC RESPIRATORY FAILURE WITH HYPOXIA (HCC): ICD-10-CM

## 2022-01-01 LAB
ABO GROUP BLD: NORMAL
ACE SERPL-CCNC: 31 U/L (ref 9–67)
ALBUMIN SERPL BCP-MCNC: 2 G/DL (ref 3.2–4.9)
ALBUMIN SERPL BCP-MCNC: 2 G/DL (ref 3.2–4.9)
ALBUMIN SERPL BCP-MCNC: 2.2 G/DL (ref 3.2–4.9)
ALBUMIN SERPL BCP-MCNC: 2.5 G/DL (ref 3.2–4.9)
ALBUMIN SERPL BCP-MCNC: 2.6 G/DL (ref 3.2–4.9)
ALBUMIN SERPL BCP-MCNC: 2.8 G/DL (ref 3.2–4.9)
ALBUMIN SERPL BCP-MCNC: 2.8 G/DL (ref 3.2–4.9)
ALBUMIN SERPL BCP-MCNC: 2.9 G/DL (ref 3.2–4.9)
ALBUMIN SERPL BCP-MCNC: 2.9 G/DL (ref 3.2–4.9)
ALBUMIN SERPL BCP-MCNC: 3 G/DL (ref 3.2–4.9)
ALBUMIN SERPL BCP-MCNC: 3.1 G/DL (ref 3.2–4.9)
ALBUMIN SERPL BCP-MCNC: 3.1 G/DL (ref 3.2–4.9)
ALBUMIN SERPL BCP-MCNC: 3.2 G/DL (ref 3.2–4.9)
ALBUMIN SERPL BCP-MCNC: 3.4 G/DL (ref 3.2–4.9)
ALBUMIN SERPL BCP-MCNC: 3.5 G/DL (ref 3.2–4.9)
ALBUMIN SERPL BCP-MCNC: 3.6 G/DL (ref 3.2–4.9)
ALBUMIN SERPL BCP-MCNC: 3.8 G/DL (ref 3.2–4.9)
ALBUMIN SERPL BCP-MCNC: 3.9 G/DL (ref 3.2–4.9)
ALBUMIN SERPL BCP-MCNC: 4.1 G/DL (ref 3.2–4.9)
ALBUMIN SERPL BCP-MCNC: 4.3 G/DL (ref 3.2–4.9)
ALBUMIN SERPL BCP-MCNC: 4.3 G/DL (ref 3.2–4.9)
ALBUMIN SERPL BCP-MCNC: 4.4 G/DL (ref 3.2–4.9)
ALBUMIN SERPL ELPH-MCNC: 3.2 G/DL (ref 3.75–5.01)
ALBUMIN/GLOB SERPL: 0.8 G/DL
ALBUMIN/GLOB SERPL: 1 G/DL
ALBUMIN/GLOB SERPL: 1 G/DL
ALBUMIN/GLOB SERPL: 1.1 G/DL
ALBUMIN/GLOB SERPL: 1.2 G/DL
ALBUMIN/GLOB SERPL: 1.2 G/DL
ALBUMIN/GLOB SERPL: 1.3 G/DL
ALBUMIN/GLOB SERPL: 1.4 G/DL
ALBUMIN/GLOB SERPL: 1.5 G/DL
ALBUMIN/GLOB SERPL: 1.5 G/DL
ALBUMIN/GLOB SERPL: 1.6 G/DL
ALBUMIN/GLOB SERPL: 1.7 G/DL
ALBUMIN/GLOB SERPL: 1.8 G/DL
ALBUMIN/GLOB SERPL: 1.9 G/DL
ALBUMIN/GLOB SERPL: 1.9 G/DL
ALBUMIN/GLOB SERPL: 2 G/DL
ALBUMIN/GLOB SERPL: 2 G/DL
ALBUMIN/GLOB SERPL: 2.8 G/DL
ALP SERPL-CCNC: 101 U/L (ref 30–99)
ALP SERPL-CCNC: 103 U/L (ref 30–99)
ALP SERPL-CCNC: 104 U/L (ref 30–99)
ALP SERPL-CCNC: 108 U/L (ref 30–99)
ALP SERPL-CCNC: 119 U/L (ref 30–99)
ALP SERPL-CCNC: 128 U/L (ref 30–99)
ALP SERPL-CCNC: 134 U/L (ref 30–99)
ALP SERPL-CCNC: 157 U/L (ref 30–99)
ALP SERPL-CCNC: 197 U/L (ref 30–99)
ALP SERPL-CCNC: 202 U/L (ref 30–99)
ALP SERPL-CCNC: 42 U/L (ref 30–99)
ALP SERPL-CCNC: 44 U/L (ref 30–99)
ALP SERPL-CCNC: 46 U/L (ref 30–99)
ALP SERPL-CCNC: 47 U/L (ref 30–99)
ALP SERPL-CCNC: 50 U/L (ref 30–99)
ALP SERPL-CCNC: 54 U/L (ref 30–99)
ALP SERPL-CCNC: 56 U/L (ref 30–99)
ALP SERPL-CCNC: 56 U/L (ref 30–99)
ALP SERPL-CCNC: 61 U/L (ref 30–99)
ALP SERPL-CCNC: 61 U/L (ref 30–99)
ALP SERPL-CCNC: 62 U/L (ref 30–99)
ALP SERPL-CCNC: 63 U/L (ref 30–99)
ALP SERPL-CCNC: 63 U/L (ref 30–99)
ALP SERPL-CCNC: 66 U/L (ref 30–99)
ALP SERPL-CCNC: 67 U/L (ref 30–99)
ALP SERPL-CCNC: 68 U/L (ref 30–99)
ALP SERPL-CCNC: 70 U/L (ref 30–99)
ALP SERPL-CCNC: 73 U/L (ref 30–99)
ALP SERPL-CCNC: 76 U/L (ref 30–99)
ALP SERPL-CCNC: 85 U/L (ref 30–99)
ALP SERPL-CCNC: 86 U/L (ref 30–99)
ALP SERPL-CCNC: 90 U/L (ref 30–99)
ALP SERPL-CCNC: 97 U/L (ref 30–99)
ALP SERPL-CCNC: 97 U/L (ref 30–99)
ALPHA1 GLOB SERPL ELPH-MCNC: 0.42 G/DL (ref 0.19–0.46)
ALPHA2 GLOB SERPL ELPH-MCNC: 0.66 G/DL (ref 0.48–1.05)
ALT SERPL-CCNC: 10 U/L (ref 2–50)
ALT SERPL-CCNC: 11 U/L (ref 2–50)
ALT SERPL-CCNC: 12 U/L (ref 2–50)
ALT SERPL-CCNC: 12 U/L (ref 2–50)
ALT SERPL-CCNC: 13 U/L (ref 2–50)
ALT SERPL-CCNC: 15 U/L (ref 2–50)
ALT SERPL-CCNC: 15 U/L (ref 2–50)
ALT SERPL-CCNC: 16 U/L (ref 2–50)
ALT SERPL-CCNC: 16 U/L (ref 2–50)
ALT SERPL-CCNC: 17 U/L (ref 2–50)
ALT SERPL-CCNC: 17 U/L (ref 2–50)
ALT SERPL-CCNC: 18 U/L (ref 2–50)
ALT SERPL-CCNC: 18 U/L (ref 2–50)
ALT SERPL-CCNC: 19 U/L (ref 2–50)
ALT SERPL-CCNC: 20 U/L (ref 2–50)
ALT SERPL-CCNC: 22 U/L (ref 2–50)
ALT SERPL-CCNC: 23 U/L (ref 2–50)
ALT SERPL-CCNC: 24 U/L (ref 2–50)
ALT SERPL-CCNC: 26 U/L (ref 2–50)
ALT SERPL-CCNC: 26 U/L (ref 2–50)
ALT SERPL-CCNC: 27 U/L (ref 2–50)
ALT SERPL-CCNC: 34 U/L (ref 2–50)
ALT SERPL-CCNC: 38 U/L (ref 2–50)
ALT SERPL-CCNC: 43 U/L (ref 2–50)
ALT SERPL-CCNC: 59 U/L (ref 2–50)
ALT SERPL-CCNC: 9 U/L (ref 2–50)
ALT SERPL-CCNC: 9 U/L (ref 2–50)
AMMONIA PLAS-SCNC: 16 UMOL/L (ref 11–45)
AMMONIA PLAS-SCNC: 19 UMOL/L (ref 11–45)
AMMONIA PLAS-SCNC: 22 UMOL/L (ref 11–45)
AMORPH CRY #/AREA URNS HPF: PRESENT /HPF
AMPHET UR QL SCN: NEGATIVE
AMYLASE SERPL-CCNC: 37 U/L (ref 25–125)
ANA INTERPRETIVE COMMENT Q5143: NORMAL
ANION GAP SERPL CALC-SCNC: 10 MMOL/L (ref 7–16)
ANION GAP SERPL CALC-SCNC: 11 MMOL/L (ref 7–16)
ANION GAP SERPL CALC-SCNC: 13 MMOL/L (ref 7–16)
ANION GAP SERPL CALC-SCNC: 14 MMOL/L (ref 7–16)
ANION GAP SERPL CALC-SCNC: 15 MMOL/L (ref 7–16)
ANION GAP SERPL CALC-SCNC: 18 MMOL/L (ref 7–16)
ANION GAP SERPL CALC-SCNC: 18 MMOL/L (ref 7–16)
ANION GAP SERPL CALC-SCNC: 23 MMOL/L (ref 7–16)
ANION GAP SERPL CALC-SCNC: 24 MMOL/L (ref 7–16)
ANION GAP SERPL CALC-SCNC: 25 MMOL/L (ref 7–16)
ANION GAP SERPL CALC-SCNC: 4 MMOL/L (ref 7–16)
ANION GAP SERPL CALC-SCNC: 4 MMOL/L (ref 7–16)
ANION GAP SERPL CALC-SCNC: 6 MMOL/L (ref 7–16)
ANION GAP SERPL CALC-SCNC: 7 MMOL/L (ref 7–16)
ANION GAP SERPL CALC-SCNC: 7 MMOL/L (ref 7–16)
ANION GAP SERPL CALC-SCNC: 8 MMOL/L (ref 7–16)
ANION GAP SERPL CALC-SCNC: 9 MMOL/L (ref 7–16)
ANISOCYTOSIS BLD QL SMEAR: ABNORMAL
ANISOCYTOSIS BLD QL SMEAR: NORMAL
ANTINUCLEAR ANTIBODY (ANA), HEP-2, IGG Q5142: NORMAL
APAP SERPL-MCNC: <5 UG/ML (ref 10–30)
APPEARANCE UR: CLEAR
APTT PPP: 27 SEC (ref 24.7–36)
APTT PPP: 28.7 SEC (ref 24.7–36)
AST SERPL-CCNC: 103 U/L (ref 12–45)
AST SERPL-CCNC: 12 U/L (ref 12–45)
AST SERPL-CCNC: 14 U/L (ref 12–45)
AST SERPL-CCNC: 16 U/L (ref 12–45)
AST SERPL-CCNC: 17 U/L (ref 12–45)
AST SERPL-CCNC: 18 U/L (ref 12–45)
AST SERPL-CCNC: 18 U/L (ref 12–45)
AST SERPL-CCNC: 19 U/L (ref 12–45)
AST SERPL-CCNC: 20 U/L (ref 12–45)
AST SERPL-CCNC: 21 U/L (ref 12–45)
AST SERPL-CCNC: 21 U/L (ref 12–45)
AST SERPL-CCNC: 23 U/L (ref 12–45)
AST SERPL-CCNC: 26 U/L (ref 12–45)
AST SERPL-CCNC: 28 U/L (ref 12–45)
AST SERPL-CCNC: 28 U/L (ref 12–45)
AST SERPL-CCNC: 29 U/L (ref 12–45)
AST SERPL-CCNC: 29 U/L (ref 12–45)
AST SERPL-CCNC: 31 U/L (ref 12–45)
AST SERPL-CCNC: 31 U/L (ref 12–45)
AST SERPL-CCNC: 32 U/L (ref 12–45)
AST SERPL-CCNC: 33 U/L (ref 12–45)
AST SERPL-CCNC: 33 U/L (ref 12–45)
AST SERPL-CCNC: 34 U/L (ref 12–45)
AST SERPL-CCNC: 35 U/L (ref 12–45)
AST SERPL-CCNC: 38 U/L (ref 12–45)
AST SERPL-CCNC: 39 U/L (ref 12–45)
AST SERPL-CCNC: 41 U/L (ref 12–45)
AST SERPL-CCNC: 42 U/L (ref 12–45)
AST SERPL-CCNC: 66 U/L (ref 12–45)
B-GLOBULIN SERPL ELPH-MCNC: 0.61 G/DL (ref 0.48–1.1)
B-OH-BUTYR SERPL-MCNC: 5.16 MMOL/L (ref 0.02–0.27)
B2 MICROGLOB SERPL-MCNC: 3.4 MG/L
BACTERIA #/AREA URNS HPF: ABNORMAL /HPF
BACTERIA #/AREA URNS HPF: NEGATIVE /HPF
BACTERIA BLD CULT: NORMAL
BARBITURATES UR QL SCN: NEGATIVE
BARCODED ABORH UBTYP: 1700
BARCODED ABORH UBTYP: 6200
BARCODED ABORH UBTYP: 6200
BARCODED ABORH UBTYP: 7300
BARCODED PRD CODE UBPRD: NORMAL
BARCODED UNIT NUM UBUNT: NORMAL
BASE EXCESS BLDV CALC-SCNC: -2 MMOL/L
BASE EXCESS BLDV CALC-SCNC: 2 MMOL/L
BASOPHILS # BLD AUTO: 0 % (ref 0–1.8)
BASOPHILS # BLD AUTO: 0 % (ref 0–1.8)
BASOPHILS # BLD AUTO: 0.1 % (ref 0–1.8)
BASOPHILS # BLD AUTO: 0.2 % (ref 0–1.8)
BASOPHILS # BLD AUTO: 0.3 % (ref 0–1.8)
BASOPHILS # BLD AUTO: 0.4 % (ref 0–1.8)
BASOPHILS # BLD AUTO: 0.5 % (ref 0–1.8)
BASOPHILS # BLD AUTO: 0.6 % (ref 0–1.8)
BASOPHILS # BLD AUTO: 0.6 % (ref 0–1.8)
BASOPHILS # BLD AUTO: 0.7 % (ref 0–1.8)
BASOPHILS # BLD AUTO: 1 % (ref 0–1.8)
BASOPHILS # BLD AUTO: 1.1 % (ref 0–1.8)
BASOPHILS # BLD AUTO: 1.1 % (ref 0–1.8)
BASOPHILS # BLD: 0 K/UL (ref 0–0.12)
BASOPHILS # BLD: 0 K/UL (ref 0–0.12)
BASOPHILS # BLD: 0.01 K/UL (ref 0–0.12)
BASOPHILS # BLD: 0.02 K/UL (ref 0–0.12)
BASOPHILS # BLD: 0.03 K/UL (ref 0–0.12)
BASOPHILS # BLD: 0.05 K/UL (ref 0–0.12)
BENZODIAZ UR QL SCN: POSITIVE
BILIRUB SERPL-MCNC: 0.2 MG/DL (ref 0.1–1.5)
BILIRUB SERPL-MCNC: 0.3 MG/DL (ref 0.1–1.5)
BILIRUB SERPL-MCNC: 0.4 MG/DL (ref 0.1–1.5)
BILIRUB SERPL-MCNC: 0.5 MG/DL (ref 0.1–1.5)
BILIRUB SERPL-MCNC: 0.6 MG/DL (ref 0.1–1.5)
BILIRUB SERPL-MCNC: 0.7 MG/DL (ref 0.1–1.5)
BILIRUB SERPL-MCNC: 0.7 MG/DL (ref 0.1–1.5)
BILIRUB SERPL-MCNC: <0.2 MG/DL (ref 0.1–1.5)
BILIRUB UR QL STRIP.AUTO: NEGATIVE
BLD GP AB SCN SERPL QL: NORMAL
BODY TEMPERATURE: 36.2 CENTIGRADE
BODY TEMPERATURE: ABNORMAL CENTIGRADE
BUN SERPL-MCNC: 10 MG/DL (ref 8–22)
BUN SERPL-MCNC: 11 MG/DL (ref 8–22)
BUN SERPL-MCNC: 12 MG/DL (ref 8–22)
BUN SERPL-MCNC: 13 MG/DL (ref 8–22)
BUN SERPL-MCNC: 14 MG/DL (ref 8–22)
BUN SERPL-MCNC: 15 MG/DL (ref 8–22)
BUN SERPL-MCNC: 17 MG/DL (ref 8–22)
BUN SERPL-MCNC: 18 MG/DL (ref 8–22)
BUN SERPL-MCNC: 20 MG/DL (ref 8–22)
BUN SERPL-MCNC: 21 MG/DL (ref 8–22)
BUN SERPL-MCNC: 21 MG/DL (ref 8–22)
BUN SERPL-MCNC: 33 MG/DL (ref 8–22)
BUN SERPL-MCNC: 34 MG/DL (ref 8–22)
BUN SERPL-MCNC: 4 MG/DL (ref 8–22)
BUN SERPL-MCNC: 5 MG/DL (ref 8–22)
BUN SERPL-MCNC: 6 MG/DL (ref 8–22)
BUN SERPL-MCNC: 6 MG/DL (ref 8–22)
BUN SERPL-MCNC: 8 MG/DL (ref 8–22)
BUN SERPL-MCNC: 9 MG/DL (ref 8–22)
BZE UR QL SCN: NEGATIVE
C DIFF DNA SPEC QL NAA+PROBE: NEGATIVE
C DIFF TOX GENS STL QL NAA+PROBE: NEGATIVE
CA-I SERPL-SCNC: 1.1 MMOL/L (ref 1.1–1.3)
CALCIUM SERPL-MCNC: 7.2 MG/DL (ref 8.5–10.5)
CALCIUM SERPL-MCNC: 7.3 MG/DL (ref 8.5–10.5)
CALCIUM SERPL-MCNC: 7.6 MG/DL (ref 8.4–10.2)
CALCIUM SERPL-MCNC: 7.8 MG/DL (ref 8.4–10.2)
CALCIUM SERPL-MCNC: 7.8 MG/DL (ref 8.5–10.5)
CALCIUM SERPL-MCNC: 8 MG/DL (ref 8.4–10.2)
CALCIUM SERPL-MCNC: 8 MG/DL (ref 8.5–10.5)
CALCIUM SERPL-MCNC: 8.2 MG/DL (ref 8.4–10.2)
CALCIUM SERPL-MCNC: 8.2 MG/DL (ref 8.5–10.5)
CALCIUM SERPL-MCNC: 8.3 MG/DL (ref 8.4–10.2)
CALCIUM SERPL-MCNC: 8.3 MG/DL (ref 8.4–10.2)
CALCIUM SERPL-MCNC: 8.4 MG/DL (ref 8.5–10.5)
CALCIUM SERPL-MCNC: 8.5 MG/DL (ref 8.4–10.2)
CALCIUM SERPL-MCNC: 8.5 MG/DL (ref 8.5–10.5)
CALCIUM SERPL-MCNC: 8.6 MG/DL (ref 8.4–10.2)
CALCIUM SERPL-MCNC: 8.6 MG/DL (ref 8.4–10.2)
CALCIUM SERPL-MCNC: 8.6 MG/DL (ref 8.5–10.5)
CALCIUM SERPL-MCNC: 8.6 MG/DL (ref 8.5–10.5)
CALCIUM SERPL-MCNC: 8.7 MG/DL (ref 8.4–10.2)
CALCIUM SERPL-MCNC: 8.7 MG/DL (ref 8.5–10.5)
CALCIUM SERPL-MCNC: 8.8 MG/DL (ref 8.4–10.2)
CALCIUM SERPL-MCNC: 8.9 MG/DL (ref 8.5–10.5)
CALCIUM SERPL-MCNC: 8.9 MG/DL (ref 8.5–10.5)
CALCIUM SERPL-MCNC: 9 MG/DL (ref 8.4–10.2)
CALCIUM SERPL-MCNC: 9 MG/DL (ref 8.5–10.5)
CALCIUM SERPL-MCNC: 9.1 MG/DL (ref 8.4–10.2)
CALCIUM SERPL-MCNC: 9.1 MG/DL (ref 8.5–10.5)
CALCIUM SERPL-MCNC: 9.3 MG/DL (ref 8.4–10.2)
CANNABINOIDS UR QL SCN: NEGATIVE
CFT BLD TEG: 2.4 MIN (ref 4.6–9.1)
CFT BLD TEG: 5.2 MIN (ref 4.6–9.1)
CFT BLD TEG: 5.5 MIN (ref 4.6–9.1)
CFT BLD TEG: 7.1 MIN (ref 4.6–9.1)
CFT P HPASE BLD TEG: 2.2 MIN (ref 4.3–8.3)
CFT P HPASE BLD TEG: 5.2 MIN (ref 4.3–8.3)
CFT P HPASE BLD TEG: 5.6 MIN (ref 4.3–8.3)
CFT P HPASE BLD TEG: 7.3 MIN (ref 4.3–8.3)
CHLORIDE SERPL-SCNC: 100 MMOL/L (ref 96–112)
CHLORIDE SERPL-SCNC: 101 MMOL/L (ref 96–112)
CHLORIDE SERPL-SCNC: 102 MMOL/L (ref 96–112)
CHLORIDE SERPL-SCNC: 103 MMOL/L (ref 96–112)
CHLORIDE SERPL-SCNC: 104 MMOL/L (ref 96–112)
CHLORIDE SERPL-SCNC: 104 MMOL/L (ref 96–112)
CHLORIDE SERPL-SCNC: 105 MMOL/L (ref 96–112)
CHLORIDE SERPL-SCNC: 108 MMOL/L (ref 96–112)
CHLORIDE SERPL-SCNC: 110 MMOL/L (ref 96–112)
CHLORIDE SERPL-SCNC: 110 MMOL/L (ref 96–112)
CHLORIDE SERPL-SCNC: 88 MMOL/L (ref 96–112)
CHLORIDE SERPL-SCNC: 93 MMOL/L (ref 96–112)
CHLORIDE SERPL-SCNC: 93 MMOL/L (ref 96–112)
CHLORIDE SERPL-SCNC: 94 MMOL/L (ref 96–112)
CHLORIDE SERPL-SCNC: 97 MMOL/L (ref 96–112)
CHLORIDE SERPL-SCNC: 98 MMOL/L (ref 96–112)
CHLORIDE SERPL-SCNC: 99 MMOL/L (ref 96–112)
CLOT ANGLE BLD TEG: 69.2 DEGREES (ref 63–78)
CLOT ANGLE BLD TEG: 72.5 DEGREES (ref 63–78)
CLOT ANGLE BLD TEG: 74.2 DEGREES (ref 63–78)
CLOT ANGLE BLD TEG: 76.1 DEGREES (ref 63–78)
CLOT LYSIS 30M P MA LENFR BLD TEG: 0 % (ref 0–2.6)
CLOT LYSIS 30M P MA LENFR BLD TEG: 0 % (ref 0–2.6)
CLOT LYSIS 30M P MA LENFR BLD TEG: 0.2 % (ref 0–2.6)
CO2 SERPL-SCNC: 20 MMOL/L (ref 20–33)
CO2 SERPL-SCNC: 21 MMOL/L (ref 20–33)
CO2 SERPL-SCNC: 24 MMOL/L (ref 20–33)
CO2 SERPL-SCNC: 25 MMOL/L (ref 20–33)
CO2 SERPL-SCNC: 26 MMOL/L (ref 20–33)
CO2 SERPL-SCNC: 27 MMOL/L (ref 20–33)
CO2 SERPL-SCNC: 28 MMOL/L (ref 20–33)
CO2 SERPL-SCNC: 29 MMOL/L (ref 20–33)
CO2 SERPL-SCNC: 30 MMOL/L (ref 20–33)
CO2 SERPL-SCNC: 31 MMOL/L (ref 20–33)
CO2 SERPL-SCNC: 31 MMOL/L (ref 20–33)
CO2 SERPL-SCNC: 32 MMOL/L (ref 20–33)
COLOR UR: YELLOW
COMMENT 1642: NORMAL
COMPONENT P 8504P: NORMAL
COMPONENT P 8504P: NORMAL
COMPONENT R 8504R: NORMAL
CREAT SERPL-MCNC: 0.34 MG/DL (ref 0.5–1.4)
CREAT SERPL-MCNC: 0.44 MG/DL (ref 0.5–1.4)
CREAT SERPL-MCNC: 0.46 MG/DL (ref 0.5–1.4)
CREAT SERPL-MCNC: 0.48 MG/DL (ref 0.5–1.4)
CREAT SERPL-MCNC: 0.52 MG/DL (ref 0.5–1.4)
CREAT SERPL-MCNC: 0.52 MG/DL (ref 0.5–1.4)
CREAT SERPL-MCNC: 0.53 MG/DL (ref 0.5–1.4)
CREAT SERPL-MCNC: 0.54 MG/DL (ref 0.5–1.4)
CREAT SERPL-MCNC: 0.55 MG/DL (ref 0.5–1.4)
CREAT SERPL-MCNC: 0.56 MG/DL (ref 0.5–1.4)
CREAT SERPL-MCNC: 0.57 MG/DL (ref 0.5–1.4)
CREAT SERPL-MCNC: 0.6 MG/DL (ref 0.5–1.4)
CREAT SERPL-MCNC: 0.63 MG/DL (ref 0.5–1.4)
CREAT SERPL-MCNC: 0.64 MG/DL (ref 0.5–1.4)
CREAT SERPL-MCNC: 0.64 MG/DL (ref 0.5–1.4)
CREAT SERPL-MCNC: 0.65 MG/DL (ref 0.5–1.4)
CREAT SERPL-MCNC: 0.66 MG/DL (ref 0.5–1.4)
CREAT SERPL-MCNC: 0.66 MG/DL (ref 0.5–1.4)
CREAT SERPL-MCNC: 0.67 MG/DL (ref 0.5–1.4)
CREAT SERPL-MCNC: 0.67 MG/DL (ref 0.5–1.4)
CREAT SERPL-MCNC: 0.68 MG/DL (ref 0.5–1.4)
CREAT SERPL-MCNC: 0.69 MG/DL (ref 0.5–1.4)
CREAT SERPL-MCNC: 0.72 MG/DL (ref 0.5–1.4)
CREAT SERPL-MCNC: 0.73 MG/DL (ref 0.5–1.4)
CREAT SERPL-MCNC: 0.75 MG/DL (ref 0.5–1.4)
CREAT SERPL-MCNC: 0.75 MG/DL (ref 0.5–1.4)
CREAT SERPL-MCNC: 0.77 MG/DL (ref 0.5–1.4)
CREAT SERPL-MCNC: 0.81 MG/DL (ref 0.5–1.4)
CREAT SERPL-MCNC: 0.84 MG/DL (ref 0.5–1.4)
CREAT SERPL-MCNC: 1.01 MG/DL (ref 0.5–1.4)
CREAT SERPL-MCNC: 1.44 MG/DL (ref 0.5–1.4)
CREAT SERPL-MCNC: 1.46 MG/DL (ref 0.5–1.4)
CT.EXTRINSIC BLD ROTEM: 0.9 MIN (ref 0.8–2.1)
CT.EXTRINSIC BLD ROTEM: 1.2 MIN (ref 0.8–2.1)
CT.EXTRINSIC BLD ROTEM: 1.4 MIN (ref 0.8–2.1)
CT.EXTRINSIC BLD ROTEM: 2.3 MIN (ref 0.8–2.1)
EER MONOCLONAL PROTEIN AND FLC, SERUM Q5224: ABNORMAL
EKG IMPRESSION: NORMAL
EOSINOPHIL # BLD AUTO: 0 K/UL (ref 0–0.51)
EOSINOPHIL # BLD AUTO: 0.01 K/UL (ref 0–0.51)
EOSINOPHIL # BLD AUTO: 0.02 K/UL (ref 0–0.51)
EOSINOPHIL # BLD AUTO: 0.02 K/UL (ref 0–0.51)
EOSINOPHIL # BLD AUTO: 0.03 K/UL (ref 0–0.51)
EOSINOPHIL # BLD AUTO: 0.07 K/UL (ref 0–0.51)
EOSINOPHIL # BLD AUTO: 0.08 K/UL (ref 0–0.51)
EOSINOPHIL # BLD AUTO: 0.1 K/UL (ref 0–0.51)
EOSINOPHIL # BLD AUTO: 0.1 K/UL (ref 0–0.51)
EOSINOPHIL # BLD AUTO: 0.11 K/UL (ref 0–0.51)
EOSINOPHIL # BLD AUTO: 0.11 K/UL (ref 0–0.51)
EOSINOPHIL # BLD AUTO: 0.12 K/UL (ref 0–0.51)
EOSINOPHIL # BLD AUTO: 0.12 K/UL (ref 0–0.51)
EOSINOPHIL # BLD AUTO: 0.13 K/UL (ref 0–0.51)
EOSINOPHIL # BLD AUTO: 0.14 K/UL (ref 0–0.51)
EOSINOPHIL # BLD AUTO: 0.18 K/UL (ref 0–0.51)
EOSINOPHIL # BLD AUTO: 0.19 K/UL (ref 0–0.51)
EOSINOPHIL # BLD AUTO: 0.19 K/UL (ref 0–0.51)
EOSINOPHIL # BLD AUTO: 0.24 K/UL (ref 0–0.51)
EOSINOPHIL NFR BLD: 0 % (ref 0–6.9)
EOSINOPHIL NFR BLD: 0.1 % (ref 0–6.9)
EOSINOPHIL NFR BLD: 0.2 % (ref 0–6.9)
EOSINOPHIL NFR BLD: 0.4 % (ref 0–6.9)
EOSINOPHIL NFR BLD: 0.5 % (ref 0–6.9)
EOSINOPHIL NFR BLD: 0.6 % (ref 0–6.9)
EOSINOPHIL NFR BLD: 1.2 % (ref 0–6.9)
EOSINOPHIL NFR BLD: 1.9 % (ref 0–6.9)
EOSINOPHIL NFR BLD: 10.5 % (ref 0–6.9)
EOSINOPHIL NFR BLD: 2.3 % (ref 0–6.9)
EOSINOPHIL NFR BLD: 3 % (ref 0–6.9)
EOSINOPHIL NFR BLD: 3.4 % (ref 0–6.9)
EOSINOPHIL NFR BLD: 3.7 % (ref 0–6.9)
EOSINOPHIL NFR BLD: 4.1 % (ref 0–6.9)
EOSINOPHIL NFR BLD: 4.5 % (ref 0–6.9)
EOSINOPHIL NFR BLD: 4.6 % (ref 0–6.9)
EOSINOPHIL NFR BLD: 4.9 % (ref 0–6.9)
EOSINOPHIL NFR BLD: 4.9 % (ref 0–6.9)
EOSINOPHIL NFR BLD: 5 % (ref 0–6.9)
EOSINOPHIL NFR BLD: 5.2 % (ref 0–6.9)
EOSINOPHIL NFR BLD: 5.5 % (ref 0–6.9)
EOSINOPHIL NFR BLD: 6 % (ref 0–6.9)
EOSINOPHIL NFR BLD: 6.3 % (ref 0–6.9)
EOSINOPHIL NFR BLD: 6.4 % (ref 0–6.9)
EOSINOPHIL NFR BLD: 7.2 % (ref 0–6.9)
EPI CELLS #/AREA URNS HPF: ABNORMAL /HPF
EPI CELLS #/AREA URNS HPF: NEGATIVE /HPF
EPI CELLS #/AREA URNS HPF: NORMAL /HPF
ERYTHROCYTE [DISTWIDTH] IN BLOOD BY AUTOMATED COUNT: 44 FL (ref 35.9–50)
ERYTHROCYTE [DISTWIDTH] IN BLOOD BY AUTOMATED COUNT: 45.1 FL (ref 35.9–50)
ERYTHROCYTE [DISTWIDTH] IN BLOOD BY AUTOMATED COUNT: 45.6 FL (ref 35.9–50)
ERYTHROCYTE [DISTWIDTH] IN BLOOD BY AUTOMATED COUNT: 46.5 FL (ref 35.9–50)
ERYTHROCYTE [DISTWIDTH] IN BLOOD BY AUTOMATED COUNT: 46.6 FL (ref 35.9–50)
ERYTHROCYTE [DISTWIDTH] IN BLOOD BY AUTOMATED COUNT: 46.6 FL (ref 35.9–50)
ERYTHROCYTE [DISTWIDTH] IN BLOOD BY AUTOMATED COUNT: 47.1 FL (ref 35.9–50)
ERYTHROCYTE [DISTWIDTH] IN BLOOD BY AUTOMATED COUNT: 47.2 FL (ref 35.9–50)
ERYTHROCYTE [DISTWIDTH] IN BLOOD BY AUTOMATED COUNT: 47.3 FL (ref 35.9–50)
ERYTHROCYTE [DISTWIDTH] IN BLOOD BY AUTOMATED COUNT: 47.5 FL (ref 35.9–50)
ERYTHROCYTE [DISTWIDTH] IN BLOOD BY AUTOMATED COUNT: 47.8 FL (ref 35.9–50)
ERYTHROCYTE [DISTWIDTH] IN BLOOD BY AUTOMATED COUNT: 47.9 FL (ref 35.9–50)
ERYTHROCYTE [DISTWIDTH] IN BLOOD BY AUTOMATED COUNT: 48.1 FL (ref 35.9–50)
ERYTHROCYTE [DISTWIDTH] IN BLOOD BY AUTOMATED COUNT: 48.2 FL (ref 35.9–50)
ERYTHROCYTE [DISTWIDTH] IN BLOOD BY AUTOMATED COUNT: 48.2 FL (ref 35.9–50)
ERYTHROCYTE [DISTWIDTH] IN BLOOD BY AUTOMATED COUNT: 48.5 FL (ref 35.9–50)
ERYTHROCYTE [DISTWIDTH] IN BLOOD BY AUTOMATED COUNT: 48.9 FL (ref 35.9–50)
ERYTHROCYTE [DISTWIDTH] IN BLOOD BY AUTOMATED COUNT: 49 FL (ref 35.9–50)
ERYTHROCYTE [DISTWIDTH] IN BLOOD BY AUTOMATED COUNT: 49.1 FL (ref 35.9–50)
ERYTHROCYTE [DISTWIDTH] IN BLOOD BY AUTOMATED COUNT: 49.1 FL (ref 35.9–50)
ERYTHROCYTE [DISTWIDTH] IN BLOOD BY AUTOMATED COUNT: 49.3 FL (ref 35.9–50)
ERYTHROCYTE [DISTWIDTH] IN BLOOD BY AUTOMATED COUNT: 49.4 FL (ref 35.9–50)
ERYTHROCYTE [DISTWIDTH] IN BLOOD BY AUTOMATED COUNT: 49.6 FL (ref 35.9–50)
ERYTHROCYTE [DISTWIDTH] IN BLOOD BY AUTOMATED COUNT: 49.7 FL (ref 35.9–50)
ERYTHROCYTE [DISTWIDTH] IN BLOOD BY AUTOMATED COUNT: 49.7 FL (ref 35.9–50)
ERYTHROCYTE [DISTWIDTH] IN BLOOD BY AUTOMATED COUNT: 50.2 FL (ref 35.9–50)
ERYTHROCYTE [DISTWIDTH] IN BLOOD BY AUTOMATED COUNT: 50.2 FL (ref 35.9–50)
ERYTHROCYTE [DISTWIDTH] IN BLOOD BY AUTOMATED COUNT: 50.4 FL (ref 35.9–50)
ERYTHROCYTE [DISTWIDTH] IN BLOOD BY AUTOMATED COUNT: 50.4 FL (ref 35.9–50)
ERYTHROCYTE [DISTWIDTH] IN BLOOD BY AUTOMATED COUNT: 50.8 FL (ref 35.9–50)
ERYTHROCYTE [DISTWIDTH] IN BLOOD BY AUTOMATED COUNT: 50.8 FL (ref 35.9–50)
ERYTHROCYTE [DISTWIDTH] IN BLOOD BY AUTOMATED COUNT: 51.1 FL (ref 35.9–50)
ERYTHROCYTE [DISTWIDTH] IN BLOOD BY AUTOMATED COUNT: 51.7 FL (ref 35.9–50)
ERYTHROCYTE [DISTWIDTH] IN BLOOD BY AUTOMATED COUNT: 51.8 FL (ref 35.9–50)
ERYTHROCYTE [DISTWIDTH] IN BLOOD BY AUTOMATED COUNT: 52 FL (ref 35.9–50)
ERYTHROCYTE [DISTWIDTH] IN BLOOD BY AUTOMATED COUNT: 53 FL (ref 35.9–50)
ERYTHROCYTE [DISTWIDTH] IN BLOOD BY AUTOMATED COUNT: 53.1 FL (ref 35.9–50)
ERYTHROCYTE [DISTWIDTH] IN BLOOD BY AUTOMATED COUNT: 53.2 FL (ref 35.9–50)
ERYTHROCYTE [DISTWIDTH] IN BLOOD BY AUTOMATED COUNT: 53.4 FL (ref 35.9–50)
ERYTHROCYTE [DISTWIDTH] IN BLOOD BY AUTOMATED COUNT: 53.8 FL (ref 35.9–50)
ETHANOL BLD-MCNC: 44.9 MG/DL
ETHANOL BLD-MCNC: <10.1 MG/DL
ETHANOL BLD-MCNC: <10.1 MG/DL
FERRITIN SERPL-MCNC: 26.2 NG/ML (ref 10–291)
FLUAV RNA SPEC QL NAA+PROBE: NEGATIVE
FLUAV RNA SPEC QL NAA+PROBE: NEGATIVE
FLUBV RNA SPEC QL NAA+PROBE: NEGATIVE
FLUBV RNA SPEC QL NAA+PROBE: NEGATIVE
GAMMA GLOB SERPL ELPH-MCNC: 0.72 G/DL (ref 0.62–1.51)
GFR SERPLBLD CREATININE-BSD FMLA CKD-EPI: 100 ML/MIN/1.73 M 2
GFR SERPLBLD CREATININE-BSD FMLA CKD-EPI: 100 ML/MIN/1.73 M 2
GFR SERPLBLD CREATININE-BSD FMLA CKD-EPI: 101 ML/MIN/1.73 M 2
GFR SERPLBLD CREATININE-BSD FMLA CKD-EPI: 102 ML/MIN/1.73 M 2
GFR SERPLBLD CREATININE-BSD FMLA CKD-EPI: 104 ML/MIN/1.73 M 2
GFR SERPLBLD CREATININE-BSD FMLA CKD-EPI: 107 ML/MIN/1.73 M 2
GFR SERPLBLD CREATININE-BSD FMLA CKD-EPI: 108 ML/MIN/1.73 M 2
GFR SERPLBLD CREATININE-BSD FMLA CKD-EPI: 109 ML/MIN/1.73 M 2
GFR SERPLBLD CREATININE-BSD FMLA CKD-EPI: 116 ML/MIN/1.73 M 2
GFR SERPLBLD CREATININE-BSD FMLA CKD-EPI: 40 ML/MIN/1.73 M 2
GFR SERPLBLD CREATININE-BSD FMLA CKD-EPI: 41 ML/MIN/1.73 M 2
GFR SERPLBLD CREATININE-BSD FMLA CKD-EPI: 63 ML/MIN/1.73 M 2
GFR SERPLBLD CREATININE-BSD FMLA CKD-EPI: 78 ML/MIN/1.73 M 2
GFR SERPLBLD CREATININE-BSD FMLA CKD-EPI: 82 ML/MIN/1.73 M 2
GFR SERPLBLD CREATININE-BSD FMLA CKD-EPI: 87 ML/MIN/1.73 M 2
GFR SERPLBLD CREATININE-BSD FMLA CKD-EPI: 90 ML/MIN/1.73 M 2
GFR SERPLBLD CREATININE-BSD FMLA CKD-EPI: 90 ML/MIN/1.73 M 2
GFR SERPLBLD CREATININE-BSD FMLA CKD-EPI: 93 ML/MIN/1.73 M 2
GFR SERPLBLD CREATININE-BSD FMLA CKD-EPI: 94 ML/MIN/1.73 M 2
GFR SERPLBLD CREATININE-BSD FMLA CKD-EPI: 98 ML/MIN/1.73 M 2
GFR SERPLBLD CREATININE-BSD FMLA CKD-EPI: 98 ML/MIN/1.73 M 2
GFR SERPLBLD CREATININE-BSD FMLA CKD-EPI: 99 ML/MIN/1.73 M 2
GFR SERPLBLD CREATININE-BSD FMLA CKD-EPI: 99 ML/MIN/1.73 M 2
GGT SERPL-CCNC: 35 U/L (ref 7–34)
GGT SERPL-CCNC: 62 U/L (ref 7–34)
GLOBULIN SER CALC-MCNC: 1.2 G/DL (ref 1.9–3.5)
GLOBULIN SER CALC-MCNC: 1.6 G/DL (ref 1.9–3.5)
GLOBULIN SER CALC-MCNC: 1.7 G/DL (ref 1.9–3.5)
GLOBULIN SER CALC-MCNC: 1.8 G/DL (ref 1.9–3.5)
GLOBULIN SER CALC-MCNC: 1.9 G/DL (ref 1.9–3.5)
GLOBULIN SER CALC-MCNC: 1.9 G/DL (ref 1.9–3.5)
GLOBULIN SER CALC-MCNC: 2 G/DL (ref 1.9–3.5)
GLOBULIN SER CALC-MCNC: 2.1 G/DL (ref 1.9–3.5)
GLOBULIN SER CALC-MCNC: 2.1 G/DL (ref 1.9–3.5)
GLOBULIN SER CALC-MCNC: 2.2 G/DL (ref 1.9–3.5)
GLOBULIN SER CALC-MCNC: 2.3 G/DL (ref 1.9–3.5)
GLOBULIN SER CALC-MCNC: 2.4 G/DL (ref 1.9–3.5)
GLOBULIN SER CALC-MCNC: 2.6 G/DL (ref 1.9–3.5)
GLOBULIN SER CALC-MCNC: 2.6 G/DL (ref 1.9–3.5)
GLOBULIN SER CALC-MCNC: 2.8 G/DL (ref 1.9–3.5)
GLOBULIN SER CALC-MCNC: 2.8 G/DL (ref 1.9–3.5)
GLOBULIN SER CALC-MCNC: 2.9 G/DL (ref 1.9–3.5)
GLUCOSE BLD STRIP.AUTO-MCNC: 100 MG/DL (ref 65–99)
GLUCOSE BLD STRIP.AUTO-MCNC: 101 MG/DL (ref 65–99)
GLUCOSE BLD STRIP.AUTO-MCNC: 102 MG/DL (ref 65–99)
GLUCOSE BLD STRIP.AUTO-MCNC: 105 MG/DL (ref 65–99)
GLUCOSE BLD STRIP.AUTO-MCNC: 105 MG/DL (ref 65–99)
GLUCOSE BLD STRIP.AUTO-MCNC: 106 MG/DL (ref 65–99)
GLUCOSE BLD STRIP.AUTO-MCNC: 108 MG/DL (ref 65–99)
GLUCOSE BLD STRIP.AUTO-MCNC: 109 MG/DL (ref 65–99)
GLUCOSE BLD STRIP.AUTO-MCNC: 111 MG/DL (ref 65–99)
GLUCOSE BLD STRIP.AUTO-MCNC: 112 MG/DL (ref 65–99)
GLUCOSE BLD STRIP.AUTO-MCNC: 112 MG/DL (ref 65–99)
GLUCOSE BLD STRIP.AUTO-MCNC: 113 MG/DL (ref 65–99)
GLUCOSE BLD STRIP.AUTO-MCNC: 113 MG/DL (ref 65–99)
GLUCOSE BLD STRIP.AUTO-MCNC: 114 MG/DL (ref 65–99)
GLUCOSE BLD STRIP.AUTO-MCNC: 115 MG/DL (ref 65–99)
GLUCOSE BLD STRIP.AUTO-MCNC: 117 MG/DL (ref 65–99)
GLUCOSE BLD STRIP.AUTO-MCNC: 118 MG/DL (ref 65–99)
GLUCOSE BLD STRIP.AUTO-MCNC: 118 MG/DL (ref 65–99)
GLUCOSE BLD STRIP.AUTO-MCNC: 120 MG/DL (ref 65–99)
GLUCOSE BLD STRIP.AUTO-MCNC: 120 MG/DL (ref 65–99)
GLUCOSE BLD STRIP.AUTO-MCNC: 121 MG/DL (ref 65–99)
GLUCOSE BLD STRIP.AUTO-MCNC: 128 MG/DL (ref 65–99)
GLUCOSE BLD STRIP.AUTO-MCNC: 129 MG/DL (ref 65–99)
GLUCOSE BLD STRIP.AUTO-MCNC: 132 MG/DL (ref 65–99)
GLUCOSE BLD STRIP.AUTO-MCNC: 135 MG/DL (ref 65–99)
GLUCOSE BLD STRIP.AUTO-MCNC: 135 MG/DL (ref 65–99)
GLUCOSE BLD STRIP.AUTO-MCNC: 138 MG/DL (ref 65–99)
GLUCOSE BLD STRIP.AUTO-MCNC: 140 MG/DL (ref 65–99)
GLUCOSE BLD STRIP.AUTO-MCNC: 141 MG/DL (ref 65–99)
GLUCOSE BLD STRIP.AUTO-MCNC: 142 MG/DL (ref 65–99)
GLUCOSE BLD STRIP.AUTO-MCNC: 142 MG/DL (ref 65–99)
GLUCOSE BLD STRIP.AUTO-MCNC: 143 MG/DL (ref 65–99)
GLUCOSE BLD STRIP.AUTO-MCNC: 145 MG/DL (ref 65–99)
GLUCOSE BLD STRIP.AUTO-MCNC: 146 MG/DL (ref 65–99)
GLUCOSE BLD STRIP.AUTO-MCNC: 146 MG/DL (ref 65–99)
GLUCOSE BLD STRIP.AUTO-MCNC: 148 MG/DL (ref 65–99)
GLUCOSE BLD STRIP.AUTO-MCNC: 148 MG/DL (ref 65–99)
GLUCOSE BLD STRIP.AUTO-MCNC: 151 MG/DL (ref 65–99)
GLUCOSE BLD STRIP.AUTO-MCNC: 153 MG/DL (ref 65–99)
GLUCOSE BLD STRIP.AUTO-MCNC: 154 MG/DL (ref 65–99)
GLUCOSE BLD STRIP.AUTO-MCNC: 155 MG/DL (ref 65–99)
GLUCOSE BLD STRIP.AUTO-MCNC: 157 MG/DL (ref 65–99)
GLUCOSE BLD STRIP.AUTO-MCNC: 158 MG/DL (ref 65–99)
GLUCOSE BLD STRIP.AUTO-MCNC: 159 MG/DL (ref 65–99)
GLUCOSE BLD STRIP.AUTO-MCNC: 160 MG/DL (ref 65–99)
GLUCOSE BLD STRIP.AUTO-MCNC: 166 MG/DL (ref 65–99)
GLUCOSE BLD STRIP.AUTO-MCNC: 170 MG/DL (ref 65–99)
GLUCOSE BLD STRIP.AUTO-MCNC: 172 MG/DL (ref 65–99)
GLUCOSE BLD STRIP.AUTO-MCNC: 173 MG/DL (ref 65–99)
GLUCOSE BLD STRIP.AUTO-MCNC: 177 MG/DL (ref 65–99)
GLUCOSE BLD STRIP.AUTO-MCNC: 179 MG/DL (ref 65–99)
GLUCOSE BLD STRIP.AUTO-MCNC: 188 MG/DL (ref 65–99)
GLUCOSE BLD STRIP.AUTO-MCNC: 194 MG/DL (ref 65–99)
GLUCOSE BLD STRIP.AUTO-MCNC: 196 MG/DL (ref 65–99)
GLUCOSE BLD STRIP.AUTO-MCNC: 202 MG/DL (ref 65–99)
GLUCOSE BLD STRIP.AUTO-MCNC: 207 MG/DL (ref 65–99)
GLUCOSE BLD STRIP.AUTO-MCNC: 214 MG/DL (ref 65–99)
GLUCOSE BLD STRIP.AUTO-MCNC: 271 MG/DL (ref 65–99)
GLUCOSE BLD STRIP.AUTO-MCNC: 74 MG/DL (ref 65–99)
GLUCOSE BLD STRIP.AUTO-MCNC: 91 MG/DL (ref 65–99)
GLUCOSE BLD STRIP.AUTO-MCNC: 99 MG/DL (ref 65–99)
GLUCOSE BLD STRIP.AUTO-MCNC: 99 MG/DL (ref 65–99)
GLUCOSE SERPL-MCNC: 102 MG/DL (ref 65–99)
GLUCOSE SERPL-MCNC: 104 MG/DL (ref 65–99)
GLUCOSE SERPL-MCNC: 104 MG/DL (ref 65–99)
GLUCOSE SERPL-MCNC: 111 MG/DL (ref 65–99)
GLUCOSE SERPL-MCNC: 115 MG/DL (ref 65–99)
GLUCOSE SERPL-MCNC: 117 MG/DL (ref 65–99)
GLUCOSE SERPL-MCNC: 118 MG/DL (ref 65–99)
GLUCOSE SERPL-MCNC: 120 MG/DL (ref 65–99)
GLUCOSE SERPL-MCNC: 120 MG/DL (ref 65–99)
GLUCOSE SERPL-MCNC: 121 MG/DL (ref 65–99)
GLUCOSE SERPL-MCNC: 128 MG/DL (ref 65–99)
GLUCOSE SERPL-MCNC: 129 MG/DL (ref 65–99)
GLUCOSE SERPL-MCNC: 131 MG/DL (ref 65–99)
GLUCOSE SERPL-MCNC: 133 MG/DL (ref 65–99)
GLUCOSE SERPL-MCNC: 134 MG/DL (ref 65–99)
GLUCOSE SERPL-MCNC: 139 MG/DL (ref 65–99)
GLUCOSE SERPL-MCNC: 142 MG/DL (ref 65–99)
GLUCOSE SERPL-MCNC: 142 MG/DL (ref 65–99)
GLUCOSE SERPL-MCNC: 164 MG/DL (ref 65–99)
GLUCOSE SERPL-MCNC: 167 MG/DL (ref 65–99)
GLUCOSE SERPL-MCNC: 170 MG/DL (ref 65–99)
GLUCOSE SERPL-MCNC: 173 MG/DL (ref 65–99)
GLUCOSE SERPL-MCNC: 205 MG/DL (ref 65–99)
GLUCOSE SERPL-MCNC: 206 MG/DL (ref 65–99)
GLUCOSE SERPL-MCNC: 221 MG/DL (ref 65–99)
GLUCOSE SERPL-MCNC: 246 MG/DL (ref 65–99)
GLUCOSE SERPL-MCNC: 288 MG/DL (ref 65–99)
GLUCOSE SERPL-MCNC: 84 MG/DL (ref 65–99)
GLUCOSE SERPL-MCNC: 86 MG/DL (ref 65–99)
GLUCOSE SERPL-MCNC: 88 MG/DL (ref 65–99)
GLUCOSE SERPL-MCNC: 90 MG/DL (ref 65–99)
GLUCOSE SERPL-MCNC: 91 MG/DL (ref 65–99)
GLUCOSE SERPL-MCNC: 91 MG/DL (ref 65–99)
GLUCOSE SERPL-MCNC: 92 MG/DL (ref 65–99)
GLUCOSE SERPL-MCNC: 94 MG/DL (ref 65–99)
GLUCOSE SERPL-MCNC: 99 MG/DL (ref 65–99)
GLUCOSE UR STRIP.AUTO-MCNC: 250 MG/DL
GLUCOSE UR STRIP.AUTO-MCNC: 250 MG/DL
GLUCOSE UR STRIP.AUTO-MCNC: NEGATIVE MG/DL
HCO3 BLDV-SCNC: 22 MMOL/L (ref 24–28)
HCO3 BLDV-SCNC: 23 MMOL/L (ref 24–28)
HCT VFR BLD AUTO: 18.2 % (ref 37–47)
HCT VFR BLD AUTO: 19.4 % (ref 37–47)
HCT VFR BLD AUTO: 21 % (ref 37–47)
HCT VFR BLD AUTO: 21.1 % (ref 37–47)
HCT VFR BLD AUTO: 21.6 % (ref 37–47)
HCT VFR BLD AUTO: 21.7 % (ref 37–47)
HCT VFR BLD AUTO: 22.2 % (ref 37–47)
HCT VFR BLD AUTO: 22.9 % (ref 37–47)
HCT VFR BLD AUTO: 23.3 % (ref 37–47)
HCT VFR BLD AUTO: 24.1 % (ref 37–47)
HCT VFR BLD AUTO: 24.8 % (ref 37–47)
HCT VFR BLD AUTO: 24.9 % (ref 37–47)
HCT VFR BLD AUTO: 25 % (ref 37–47)
HCT VFR BLD AUTO: 25 % (ref 37–47)
HCT VFR BLD AUTO: 25.2 % (ref 37–47)
HCT VFR BLD AUTO: 26 % (ref 37–47)
HCT VFR BLD AUTO: 26.3 % (ref 37–47)
HCT VFR BLD AUTO: 26.4 % (ref 37–47)
HCT VFR BLD AUTO: 27.5 % (ref 37–47)
HCT VFR BLD AUTO: 27.6 % (ref 37–47)
HCT VFR BLD AUTO: 28.4 % (ref 37–47)
HCT VFR BLD AUTO: 28.5 % (ref 37–47)
HCT VFR BLD AUTO: 30.5 % (ref 37–47)
HCT VFR BLD AUTO: 31.4 % (ref 37–47)
HCT VFR BLD AUTO: 32.7 % (ref 37–47)
HCT VFR BLD AUTO: 32.7 % (ref 37–47)
HCT VFR BLD AUTO: 33.8 % (ref 37–47)
HCT VFR BLD AUTO: 33.8 % (ref 37–47)
HCT VFR BLD AUTO: 33.9 % (ref 37–47)
HCT VFR BLD AUTO: 34.2 % (ref 37–47)
HCT VFR BLD AUTO: 34.8 % (ref 37–47)
HCT VFR BLD AUTO: 35.1 % (ref 37–47)
HCT VFR BLD AUTO: 35.5 % (ref 37–47)
HCT VFR BLD AUTO: 35.5 % (ref 37–47)
HCT VFR BLD AUTO: 35.7 % (ref 37–47)
HCT VFR BLD AUTO: 36.6 % (ref 37–47)
HCT VFR BLD AUTO: 36.6 % (ref 37–47)
HCT VFR BLD AUTO: 37.4 % (ref 37–47)
HCT VFR BLD AUTO: 37.6 % (ref 37–47)
HCT VFR BLD AUTO: 43.2 % (ref 37–47)
HCT VFR BLD AUTO: 43.4 % (ref 37–47)
HEMOCCULT STL QL: NEGATIVE
HGB BLD-MCNC: 10.2 G/DL (ref 12–16)
HGB BLD-MCNC: 10.5 G/DL (ref 12–16)
HGB BLD-MCNC: 10.5 G/DL (ref 12–16)
HGB BLD-MCNC: 10.6 G/DL (ref 12–16)
HGB BLD-MCNC: 10.7 G/DL (ref 12–16)
HGB BLD-MCNC: 10.8 G/DL (ref 12–16)
HGB BLD-MCNC: 10.9 G/DL (ref 12–16)
HGB BLD-MCNC: 11.1 G/DL (ref 12–16)
HGB BLD-MCNC: 11.3 G/DL (ref 12–16)
HGB BLD-MCNC: 11.4 G/DL (ref 12–16)
HGB BLD-MCNC: 11.7 G/DL (ref 12–16)
HGB BLD-MCNC: 11.8 G/DL (ref 12–16)
HGB BLD-MCNC: 11.9 G/DL (ref 12–16)
HGB BLD-MCNC: 14.2 G/DL (ref 12–16)
HGB BLD-MCNC: 14.2 G/DL (ref 12–16)
HGB BLD-MCNC: 6 G/DL (ref 12–16)
HGB BLD-MCNC: 6.5 G/DL (ref 12–16)
HGB BLD-MCNC: 6.6 G/DL (ref 12–16)
HGB BLD-MCNC: 6.7 G/DL (ref 12–16)
HGB BLD-MCNC: 7 G/DL (ref 12–16)
HGB BLD-MCNC: 7.6 G/DL (ref 12–16)
HGB BLD-MCNC: 7.7 G/DL (ref 12–16)
HGB BLD-MCNC: 7.7 G/DL (ref 12–16)
HGB BLD-MCNC: 7.8 G/DL (ref 12–16)
HGB BLD-MCNC: 7.8 G/DL (ref 12–16)
HGB BLD-MCNC: 7.9 G/DL (ref 12–16)
HGB BLD-MCNC: 8 G/DL (ref 12–16)
HGB BLD-MCNC: 8.1 G/DL (ref 12–16)
HGB BLD-MCNC: 8.2 G/DL (ref 12–16)
HGB BLD-MCNC: 8.5 G/DL (ref 12–16)
HGB BLD-MCNC: 8.8 G/DL (ref 12–16)
HGB BLD-MCNC: 8.9 G/DL (ref 12–16)
HGB BLD-MCNC: 9.2 G/DL (ref 12–16)
HGB BLD-MCNC: 9.6 G/DL (ref 12–16)
HGB BLD-MCNC: 9.7 G/DL (ref 12–16)
HYALINE CASTS #/AREA URNS LPF: ABNORMAL /LPF
HYALINE CASTS #/AREA URNS LPF: ABNORMAL /LPF
HYPOCHROMIA BLD QL SMEAR: ABNORMAL
IGA SERPL-MCNC: 125 MG/DL (ref 68–408)
IGG SERPL-MCNC: 677 MG/DL (ref 768–1632)
IGM SERPL-MCNC: 59 MG/DL (ref 35–263)
IMM GRANULOCYTES # BLD AUTO: 0 K/UL (ref 0–0.11)
IMM GRANULOCYTES # BLD AUTO: 0.01 K/UL (ref 0–0.11)
IMM GRANULOCYTES # BLD AUTO: 0.02 K/UL (ref 0–0.11)
IMM GRANULOCYTES # BLD AUTO: 0.03 K/UL (ref 0–0.11)
IMM GRANULOCYTES # BLD AUTO: 0.03 K/UL (ref 0–0.11)
IMM GRANULOCYTES # BLD AUTO: 0.04 K/UL (ref 0–0.11)
IMM GRANULOCYTES # BLD AUTO: 0.04 K/UL (ref 0–0.11)
IMM GRANULOCYTES # BLD AUTO: 0.05 K/UL (ref 0–0.11)
IMM GRANULOCYTES # BLD AUTO: 0.05 K/UL (ref 0–0.11)
IMM GRANULOCYTES # BLD AUTO: 0.06 K/UL (ref 0–0.11)
IMM GRANULOCYTES # BLD AUTO: 0.07 K/UL (ref 0–0.11)
IMM GRANULOCYTES # BLD AUTO: 0.07 K/UL (ref 0–0.11)
IMM GRANULOCYTES # BLD AUTO: 0.09 K/UL (ref 0–0.11)
IMM GRANULOCYTES # BLD AUTO: 0.11 K/UL (ref 0–0.11)
IMM GRANULOCYTES NFR BLD AUTO: 0 % (ref 0–0.9)
IMM GRANULOCYTES NFR BLD AUTO: 0.2 % (ref 0–0.9)
IMM GRANULOCYTES NFR BLD AUTO: 0.3 % (ref 0–0.9)
IMM GRANULOCYTES NFR BLD AUTO: 0.4 % (ref 0–0.9)
IMM GRANULOCYTES NFR BLD AUTO: 0.5 % (ref 0–0.9)
IMM GRANULOCYTES NFR BLD AUTO: 0.5 % (ref 0–0.9)
IMM GRANULOCYTES NFR BLD AUTO: 0.6 % (ref 0–0.9)
IMM GRANULOCYTES NFR BLD AUTO: 0.7 % (ref 0–0.9)
IMM GRANULOCYTES NFR BLD AUTO: 0.8 % (ref 0–0.9)
IMM GRANULOCYTES NFR BLD AUTO: 0.8 % (ref 0–0.9)
IMM GRANULOCYTES NFR BLD AUTO: 0.9 % (ref 0–0.9)
IMM GRANULOCYTES NFR BLD AUTO: 0.9 % (ref 0–0.9)
IMM GRANULOCYTES NFR BLD AUTO: 1.2 % (ref 0–0.9)
IMM GRANULOCYTES NFR BLD AUTO: 1.3 % (ref 0–0.9)
IMM GRANULOCYTES NFR BLD AUTO: 1.9 % (ref 0–0.9)
INR PPP: 0.88 (ref 0.87–1.13)
INR PPP: 0.92 (ref 0.87–1.13)
INR PPP: 0.93 (ref 0.87–1.13)
INR PPP: 1.05 (ref 0.87–1.13)
INTERPRETATION SERPL IFE-IMP: ABNORMAL
INTERPRETATION SERPL IFE-IMP: ABNORMAL
IRON SATN MFR SERPL: 20 % (ref 15–55)
IRON SATN MFR SERPL: 20 % (ref 15–55)
IRON SATN MFR SERPL: 27 % (ref 15–55)
IRON SATN MFR SERPL: 29 % (ref 15–55)
IRON SATN MFR SERPL: 8 % (ref 15–55)
IRON SATN MFR SERPL: 82 % (ref 15–55)
IRON SERPL-MCNC: 12 UG/DL (ref 40–170)
IRON SERPL-MCNC: 129 UG/DL (ref 40–170)
IRON SERPL-MCNC: 28 UG/DL (ref 40–170)
IRON SERPL-MCNC: 31 UG/DL (ref 40–170)
IRON SERPL-MCNC: 37 UG/DL (ref 40–170)
IRON SERPL-MCNC: 57 UG/DL (ref 40–170)
KAPPA LC FREE SER-MCNC: 30.15 MG/L (ref 3.3–19.4)
KAPPA LC FREE/LAMBDA FREE SER NEPH: 1.29 {RATIO} (ref 0.26–1.65)
KETONES UR STRIP.AUTO-MCNC: 15 MG/DL
KETONES UR STRIP.AUTO-MCNC: ABNORMAL MG/DL
KETONES UR STRIP.AUTO-MCNC: NEGATIVE MG/DL
LACTATE BLD-SCNC: 0.6 MMOL/L (ref 0.5–2)
LACTATE BLD-SCNC: 1.1 MMOL/L (ref 0.5–2)
LACTATE BLD-SCNC: 1.3 MMOL/L (ref 0.5–2)
LACTATE BLD-SCNC: 1.6 MMOL/L (ref 0.5–2)
LACTATE BLD-SCNC: 1.8 MMOL/L (ref 0.5–2)
LACTATE BLD-SCNC: 1.8 MMOL/L (ref 0.5–2)
LACTATE BLD-SCNC: 2.5 MMOL/L (ref 0.5–2)
LACTATE BLD-SCNC: 2.9 MMOL/L (ref 0.5–2)
LACTATE BLD-SCNC: 3 MMOL/L (ref 0.5–2)
LACTATE BLD-SCNC: 5.1 MMOL/L (ref 0.5–2)
LACTATE BLD-SCNC: 6.8 MMOL/L (ref 0.5–2)
LACTATE BLD-SCNC: 7 MMOL/L (ref 0.5–2)
LACTATE SERPL-SCNC: 0.5 MMOL/L (ref 0.5–2)
LACTATE SERPL-SCNC: 1.9 MMOL/L (ref 0.5–2)
LAMBDA LC FREE SERPL-MCNC: 23.31 MG/L (ref 5.71–26.3)
LEUKOCYTE ESTERASE UR QL STRIP.AUTO: ABNORMAL
LEUKOCYTE ESTERASE UR QL STRIP.AUTO: NEGATIVE
LIPASE SERPL-CCNC: 10 U/L (ref 7–58)
LIPASE SERPL-CCNC: 12 U/L (ref 11–82)
LIPASE SERPL-CCNC: 15 U/L (ref 7–58)
LIPASE SERPL-CCNC: 6 U/L (ref 11–82)
LIPASE SERPL-CCNC: 6 U/L (ref 7–58)
LIPASE SERPL-CCNC: 9 U/L (ref 7–58)
LV EJECT FRACT MOD 2C 99903: 63.97
LV EJECT FRACT MOD 4C 99902: 59.87
LV EJECT FRACT MOD BP 99901: 56.14
LYMPHOCYTES # BLD AUTO: 0.59 K/UL (ref 1–4.8)
LYMPHOCYTES # BLD AUTO: 0.59 K/UL (ref 1–4.8)
LYMPHOCYTES # BLD AUTO: 0.6 K/UL (ref 1–4.8)
LYMPHOCYTES # BLD AUTO: 0.61 K/UL (ref 1–4.8)
LYMPHOCYTES # BLD AUTO: 0.63 K/UL (ref 1–4.8)
LYMPHOCYTES # BLD AUTO: 0.64 K/UL (ref 1–4.8)
LYMPHOCYTES # BLD AUTO: 0.65 K/UL (ref 1–4.8)
LYMPHOCYTES # BLD AUTO: 0.7 K/UL (ref 1–4.8)
LYMPHOCYTES # BLD AUTO: 0.71 K/UL (ref 1–4.8)
LYMPHOCYTES # BLD AUTO: 0.72 K/UL (ref 1–4.8)
LYMPHOCYTES # BLD AUTO: 0.73 K/UL (ref 1–4.8)
LYMPHOCYTES # BLD AUTO: 0.76 K/UL (ref 1–4.8)
LYMPHOCYTES # BLD AUTO: 0.77 K/UL (ref 1–4.8)
LYMPHOCYTES # BLD AUTO: 0.77 K/UL (ref 1–4.8)
LYMPHOCYTES # BLD AUTO: 0.79 K/UL (ref 1–4.8)
LYMPHOCYTES # BLD AUTO: 0.79 K/UL (ref 1–4.8)
LYMPHOCYTES # BLD AUTO: 0.81 K/UL (ref 1–4.8)
LYMPHOCYTES # BLD AUTO: 0.82 K/UL (ref 1–4.8)
LYMPHOCYTES # BLD AUTO: 0.83 K/UL (ref 1–4.8)
LYMPHOCYTES # BLD AUTO: 0.83 K/UL (ref 1–4.8)
LYMPHOCYTES # BLD AUTO: 0.85 K/UL (ref 1–4.8)
LYMPHOCYTES # BLD AUTO: 0.87 K/UL (ref 1–4.8)
LYMPHOCYTES # BLD AUTO: 0.89 K/UL (ref 1–4.8)
LYMPHOCYTES # BLD AUTO: 0.91 K/UL (ref 1–4.8)
LYMPHOCYTES # BLD AUTO: 0.92 K/UL (ref 1–4.8)
LYMPHOCYTES # BLD AUTO: 0.93 K/UL (ref 1–4.8)
LYMPHOCYTES # BLD AUTO: 0.93 K/UL (ref 1–4.8)
LYMPHOCYTES # BLD AUTO: 1 K/UL (ref 1–4.8)
LYMPHOCYTES # BLD AUTO: 1 K/UL (ref 1–4.8)
LYMPHOCYTES # BLD AUTO: 1.09 K/UL (ref 1–4.8)
LYMPHOCYTES # BLD AUTO: 1.11 K/UL (ref 1–4.8)
LYMPHOCYTES NFR BLD: 10.1 % (ref 22–41)
LYMPHOCYTES NFR BLD: 10.5 % (ref 22–41)
LYMPHOCYTES NFR BLD: 12.5 % (ref 22–41)
LYMPHOCYTES NFR BLD: 13.8 % (ref 22–41)
LYMPHOCYTES NFR BLD: 14.3 % (ref 22–41)
LYMPHOCYTES NFR BLD: 15.3 % (ref 22–41)
LYMPHOCYTES NFR BLD: 16.1 % (ref 22–41)
LYMPHOCYTES NFR BLD: 17.6 % (ref 22–41)
LYMPHOCYTES NFR BLD: 17.7 % (ref 22–41)
LYMPHOCYTES NFR BLD: 18.3 % (ref 22–41)
LYMPHOCYTES NFR BLD: 18.5 % (ref 22–41)
LYMPHOCYTES NFR BLD: 19.6 % (ref 22–41)
LYMPHOCYTES NFR BLD: 20.1 % (ref 22–41)
LYMPHOCYTES NFR BLD: 23.2 % (ref 22–41)
LYMPHOCYTES NFR BLD: 23.4 % (ref 22–41)
LYMPHOCYTES NFR BLD: 23.6 % (ref 22–41)
LYMPHOCYTES NFR BLD: 24.3 % (ref 22–41)
LYMPHOCYTES NFR BLD: 26.6 % (ref 22–41)
LYMPHOCYTES NFR BLD: 27.7 % (ref 22–41)
LYMPHOCYTES NFR BLD: 28.3 % (ref 22–41)
LYMPHOCYTES NFR BLD: 28.9 % (ref 22–41)
LYMPHOCYTES NFR BLD: 30.5 % (ref 22–41)
LYMPHOCYTES NFR BLD: 32.5 % (ref 22–41)
LYMPHOCYTES NFR BLD: 33.4 % (ref 22–41)
LYMPHOCYTES NFR BLD: 40.2 % (ref 22–41)
LYMPHOCYTES NFR BLD: 40.8 % (ref 22–41)
LYMPHOCYTES NFR BLD: 40.9 % (ref 22–41)
LYMPHOCYTES NFR BLD: 48.5 % (ref 22–41)
LYMPHOCYTES NFR BLD: 7.2 % (ref 22–41)
LYMPHOCYTES NFR BLD: 9.6 % (ref 22–41)
LYMPHOCYTES NFR BLD: 9.8 % (ref 22–41)
MACROCYTES BLD QL SMEAR: ABNORMAL
MAGNESIUM SERPL-MCNC: 1.3 MG/DL (ref 1.5–2.5)
MAGNESIUM SERPL-MCNC: 1.6 MG/DL (ref 1.5–2.5)
MAGNESIUM SERPL-MCNC: 1.7 MG/DL (ref 1.5–2.5)
MAGNESIUM SERPL-MCNC: 1.7 MG/DL (ref 1.5–2.5)
MAGNESIUM SERPL-MCNC: 1.8 MG/DL (ref 1.5–2.5)
MAGNESIUM SERPL-MCNC: 1.9 MG/DL (ref 1.5–2.5)
MAGNESIUM SERPL-MCNC: 2 MG/DL (ref 1.5–2.5)
MAGNESIUM SERPL-MCNC: 2.1 MG/DL (ref 1.5–2.5)
MAGNESIUM SERPL-MCNC: 2.1 MG/DL (ref 1.5–2.5)
MAGNESIUM SERPL-MCNC: 2.4 MG/DL (ref 1.5–2.5)
MAGNESIUM SERPL-MCNC: 2.5 MG/DL (ref 1.5–2.5)
MANUAL DIFF BLD: ABNORMAL
MCF BLD TEG: 50.1 MM (ref 52–69)
MCF BLD TEG: 57.8 MM (ref 52–69)
MCF BLD TEG: 59.8 MM (ref 52–69)
MCF BLD TEG: 66.8 MM (ref 52–69)
MCF.PLATELET INHIB BLD ROTEM: 15.4 MM (ref 15–32)
MCF.PLATELET INHIB BLD ROTEM: 18.2 MM (ref 15–32)
MCF.PLATELET INHIB BLD ROTEM: 19.6 MM (ref 15–32)
MCF.PLATELET INHIB BLD ROTEM: 32.2 MM (ref 15–32)
MCH RBC QN AUTO: 24.9 PG (ref 27–33)
MCH RBC QN AUTO: 25.4 PG (ref 27–33)
MCH RBC QN AUTO: 25.5 PG (ref 27–33)
MCH RBC QN AUTO: 25.7 PG (ref 27–33)
MCH RBC QN AUTO: 25.8 PG (ref 27–33)
MCH RBC QN AUTO: 25.9 PG (ref 27–33)
MCH RBC QN AUTO: 26.1 PG (ref 27–33)
MCH RBC QN AUTO: 26.5 PG (ref 27–33)
MCH RBC QN AUTO: 26.7 PG (ref 27–33)
MCH RBC QN AUTO: 26.7 PG (ref 27–33)
MCH RBC QN AUTO: 26.8 PG (ref 27–33)
MCH RBC QN AUTO: 27.3 PG (ref 27–33)
MCH RBC QN AUTO: 27.3 PG (ref 27–33)
MCH RBC QN AUTO: 27.6 PG (ref 27–33)
MCH RBC QN AUTO: 29.2 PG (ref 27–33)
MCH RBC QN AUTO: 29.3 PG (ref 27–33)
MCH RBC QN AUTO: 29.4 PG (ref 27–33)
MCH RBC QN AUTO: 29.6 PG (ref 27–33)
MCH RBC QN AUTO: 29.7 PG (ref 27–33)
MCH RBC QN AUTO: 29.8 PG (ref 27–33)
MCH RBC QN AUTO: 29.8 PG (ref 27–33)
MCH RBC QN AUTO: 29.9 PG (ref 27–33)
MCH RBC QN AUTO: 29.9 PG (ref 27–33)
MCH RBC QN AUTO: 30 PG (ref 27–33)
MCH RBC QN AUTO: 30 PG (ref 27–33)
MCH RBC QN AUTO: 30.3 PG (ref 27–33)
MCH RBC QN AUTO: 30.5 PG (ref 27–33)
MCH RBC QN AUTO: 30.6 PG (ref 27–33)
MCH RBC QN AUTO: 30.7 PG (ref 27–33)
MCH RBC QN AUTO: 30.9 PG (ref 27–33)
MCH RBC QN AUTO: 31 PG (ref 27–33)
MCH RBC QN AUTO: 31.1 PG (ref 27–33)
MCH RBC QN AUTO: 31.3 PG (ref 27–33)
MCHC RBC AUTO-ENTMCNC: 29.3 G/DL (ref 33.6–35)
MCHC RBC AUTO-ENTMCNC: 29.6 G/DL (ref 33.6–35)
MCHC RBC AUTO-ENTMCNC: 29.7 G/DL (ref 33.6–35)
MCHC RBC AUTO-ENTMCNC: 30 G/DL (ref 33.6–35)
MCHC RBC AUTO-ENTMCNC: 30.2 G/DL (ref 33.6–35)
MCHC RBC AUTO-ENTMCNC: 30.3 G/DL (ref 33.6–35)
MCHC RBC AUTO-ENTMCNC: 30.4 G/DL (ref 33.6–35)
MCHC RBC AUTO-ENTMCNC: 30.5 G/DL (ref 33.6–35)
MCHC RBC AUTO-ENTMCNC: 30.6 G/DL (ref 33.6–35)
MCHC RBC AUTO-ENTMCNC: 30.9 G/DL (ref 33.6–35)
MCHC RBC AUTO-ENTMCNC: 30.9 G/DL (ref 33.6–35)
MCHC RBC AUTO-ENTMCNC: 31 G/DL (ref 33.6–35)
MCHC RBC AUTO-ENTMCNC: 31.1 G/DL (ref 33.6–35)
MCHC RBC AUTO-ENTMCNC: 31.2 G/DL (ref 33.6–35)
MCHC RBC AUTO-ENTMCNC: 31.2 G/DL (ref 33.6–35)
MCHC RBC AUTO-ENTMCNC: 31.3 G/DL (ref 33.6–35)
MCHC RBC AUTO-ENTMCNC: 31.6 G/DL (ref 33.6–35)
MCHC RBC AUTO-ENTMCNC: 31.8 G/DL (ref 33.6–35)
MCHC RBC AUTO-ENTMCNC: 31.8 G/DL (ref 33.6–35)
MCHC RBC AUTO-ENTMCNC: 32 G/DL (ref 33.6–35)
MCHC RBC AUTO-ENTMCNC: 32.1 G/DL (ref 33.6–35)
MCHC RBC AUTO-ENTMCNC: 32.2 G/DL (ref 33.6–35)
MCHC RBC AUTO-ENTMCNC: 32.4 G/DL (ref 33.6–35)
MCHC RBC AUTO-ENTMCNC: 32.5 G/DL (ref 33.6–35)
MCHC RBC AUTO-ENTMCNC: 32.7 G/DL (ref 33.6–35)
MCHC RBC AUTO-ENTMCNC: 32.7 G/DL (ref 33.6–35)
MCHC RBC AUTO-ENTMCNC: 32.8 G/DL (ref 33.6–35)
MCHC RBC AUTO-ENTMCNC: 32.9 G/DL (ref 33.6–35)
MCHC RBC AUTO-ENTMCNC: 33 G/DL (ref 33.6–35)
MCHC RBC AUTO-ENTMCNC: 33 G/DL (ref 33.6–35)
MCHC RBC AUTO-ENTMCNC: 33.2 G/DL (ref 33.6–35)
MCHC RBC AUTO-ENTMCNC: 33.3 G/DL (ref 33.6–35)
MCHC RBC AUTO-ENTMCNC: 33.3 G/DL (ref 33.6–35)
MCHC RBC AUTO-ENTMCNC: 33.7 G/DL (ref 33.6–35)
MCHC RBC AUTO-ENTMCNC: 33.9 G/DL (ref 33.6–35)
MCHC RBC AUTO-ENTMCNC: 34.6 G/DL (ref 33.6–35)
MCV RBC AUTO: 82 FL (ref 81.4–97.8)
MCV RBC AUTO: 82.5 FL (ref 81.4–97.8)
MCV RBC AUTO: 82.8 FL (ref 81.4–97.8)
MCV RBC AUTO: 83.4 FL (ref 81.4–97.8)
MCV RBC AUTO: 84.3 FL (ref 81.4–97.8)
MCV RBC AUTO: 84.3 FL (ref 81.4–97.8)
MCV RBC AUTO: 84.4 FL (ref 81.4–97.8)
MCV RBC AUTO: 85 FL (ref 81.4–97.8)
MCV RBC AUTO: 85.7 FL (ref 81.4–97.8)
MCV RBC AUTO: 86.5 FL (ref 81.4–97.8)
MCV RBC AUTO: 87.4 FL (ref 81.4–97.8)
MCV RBC AUTO: 87.7 FL (ref 81.4–97.8)
MCV RBC AUTO: 87.7 FL (ref 81.4–97.8)
MCV RBC AUTO: 87.9 FL (ref 81.4–97.8)
MCV RBC AUTO: 88.2 FL (ref 81.4–97.8)
MCV RBC AUTO: 89.3 FL (ref 81.4–97.8)
MCV RBC AUTO: 90.1 FL (ref 81.4–97.8)
MCV RBC AUTO: 90.5 FL (ref 81.4–97.8)
MCV RBC AUTO: 90.6 FL (ref 81.4–97.8)
MCV RBC AUTO: 90.6 FL (ref 81.4–97.8)
MCV RBC AUTO: 90.7 FL (ref 81.4–97.8)
MCV RBC AUTO: 91.3 FL (ref 81.4–97.8)
MCV RBC AUTO: 91.7 FL (ref 81.4–97.8)
MCV RBC AUTO: 91.9 FL (ref 81.4–97.8)
MCV RBC AUTO: 92 FL (ref 81.4–97.8)
MCV RBC AUTO: 92.9 FL (ref 81.4–97.8)
MCV RBC AUTO: 93.3 FL (ref 81.4–97.8)
MCV RBC AUTO: 93.4 FL (ref 81.4–97.8)
MCV RBC AUTO: 93.4 FL (ref 81.4–97.8)
MCV RBC AUTO: 93.6 FL (ref 81.4–97.8)
MCV RBC AUTO: 93.6 FL (ref 81.4–97.8)
MCV RBC AUTO: 94.1 FL (ref 81.4–97.8)
MCV RBC AUTO: 94.3 FL (ref 81.4–97.8)
MCV RBC AUTO: 94.7 FL (ref 81.4–97.8)
MCV RBC AUTO: 94.9 FL (ref 81.4–97.8)
MCV RBC AUTO: 95.2 FL (ref 81.4–97.8)
MCV RBC AUTO: 95.2 FL (ref 81.4–97.8)
MCV RBC AUTO: 95.3 FL (ref 81.4–97.8)
MCV RBC AUTO: 96.3 FL (ref 81.4–97.8)
MCV RBC AUTO: 96.9 FL (ref 81.4–97.8)
METHADONE UR QL SCN: NEGATIVE
MICRO URNS: ABNORMAL
MICRO URNS: NORMAL
MICRO URNS: NORMAL
MICROCYTES BLD QL SMEAR: NORMAL
MONOCYTES # BLD AUTO: 0.11 K/UL (ref 0–0.85)
MONOCYTES # BLD AUTO: 0.13 K/UL (ref 0–0.85)
MONOCYTES # BLD AUTO: 0.17 K/UL (ref 0–0.85)
MONOCYTES # BLD AUTO: 0.2 K/UL (ref 0–0.85)
MONOCYTES # BLD AUTO: 0.2 K/UL (ref 0–0.85)
MONOCYTES # BLD AUTO: 0.21 K/UL (ref 0–0.85)
MONOCYTES # BLD AUTO: 0.22 K/UL (ref 0–0.85)
MONOCYTES # BLD AUTO: 0.22 K/UL (ref 0–0.85)
MONOCYTES # BLD AUTO: 0.23 K/UL (ref 0–0.85)
MONOCYTES # BLD AUTO: 0.23 K/UL (ref 0–0.85)
MONOCYTES # BLD AUTO: 0.24 K/UL (ref 0–0.85)
MONOCYTES # BLD AUTO: 0.25 K/UL (ref 0–0.85)
MONOCYTES # BLD AUTO: 0.27 K/UL (ref 0–0.85)
MONOCYTES # BLD AUTO: 0.28 K/UL (ref 0–0.85)
MONOCYTES # BLD AUTO: 0.31 K/UL (ref 0–0.85)
MONOCYTES # BLD AUTO: 0.33 K/UL (ref 0–0.85)
MONOCYTES # BLD AUTO: 0.34 K/UL (ref 0–0.85)
MONOCYTES # BLD AUTO: 0.34 K/UL (ref 0–0.85)
MONOCYTES # BLD AUTO: 0.36 K/UL (ref 0–0.85)
MONOCYTES # BLD AUTO: 0.36 K/UL (ref 0–0.85)
MONOCYTES # BLD AUTO: 0.37 K/UL (ref 0–0.85)
MONOCYTES # BLD AUTO: 0.38 K/UL (ref 0–0.85)
MONOCYTES # BLD AUTO: 0.43 K/UL (ref 0–0.85)
MONOCYTES # BLD AUTO: 0.45 K/UL (ref 0–0.85)
MONOCYTES # BLD AUTO: 0.49 K/UL (ref 0–0.85)
MONOCYTES # BLD AUTO: 0.51 K/UL (ref 0–0.85)
MONOCYTES # BLD AUTO: 0.54 K/UL (ref 0–0.85)
MONOCYTES # BLD AUTO: 0.55 K/UL (ref 0–0.85)
MONOCYTES # BLD AUTO: 0.58 K/UL (ref 0–0.85)
MONOCYTES # BLD AUTO: 0.59 K/UL (ref 0–0.85)
MONOCYTES # BLD AUTO: 0.85 K/UL (ref 0–0.85)
MONOCYTES NFR BLD AUTO: 10 % (ref 0–13.4)
MONOCYTES NFR BLD AUTO: 10.5 % (ref 0–13.4)
MONOCYTES NFR BLD AUTO: 11.1 % (ref 0–13.4)
MONOCYTES NFR BLD AUTO: 12 % (ref 0–13.4)
MONOCYTES NFR BLD AUTO: 14.2 % (ref 0–13.4)
MONOCYTES NFR BLD AUTO: 14.3 % (ref 0–13.4)
MONOCYTES NFR BLD AUTO: 2.5 % (ref 0–13.4)
MONOCYTES NFR BLD AUTO: 4.8 % (ref 0–13.4)
MONOCYTES NFR BLD AUTO: 4.8 % (ref 0–13.4)
MONOCYTES NFR BLD AUTO: 5.9 % (ref 0–13.4)
MONOCYTES NFR BLD AUTO: 6.1 % (ref 0–13.4)
MONOCYTES NFR BLD AUTO: 6.5 % (ref 0–13.4)
MONOCYTES NFR BLD AUTO: 7 % (ref 0–13.4)
MONOCYTES NFR BLD AUTO: 7 % (ref 0–13.4)
MONOCYTES NFR BLD AUTO: 7.2 % (ref 0–13.4)
MONOCYTES NFR BLD AUTO: 7.2 % (ref 0–13.4)
MONOCYTES NFR BLD AUTO: 7.3 % (ref 0–13.4)
MONOCYTES NFR BLD AUTO: 7.6 % (ref 0–13.4)
MONOCYTES NFR BLD AUTO: 7.9 % (ref 0–13.4)
MONOCYTES NFR BLD AUTO: 8 % (ref 0–13.4)
MONOCYTES NFR BLD AUTO: 8.5 % (ref 0–13.4)
MONOCYTES NFR BLD AUTO: 8.7 % (ref 0–13.4)
MONOCYTES NFR BLD AUTO: 8.8 % (ref 0–13.4)
MONOCYTES NFR BLD AUTO: 8.9 % (ref 0–13.4)
MONOCYTES NFR BLD AUTO: 8.9 % (ref 0–13.4)
MONOCYTES NFR BLD AUTO: 9 % (ref 0–13.4)
MONOCYTES NFR BLD AUTO: 9 % (ref 0–13.4)
MONOCYTES NFR BLD AUTO: 9.2 % (ref 0–13.4)
MONOCYTES NFR BLD AUTO: 9.2 % (ref 0–13.4)
MONOCYTES NFR BLD AUTO: 9.6 % (ref 0–13.4)
MONOCYTES NFR BLD AUTO: 9.8 % (ref 0–13.4)
MORPHOLOGY BLD-IMP: NORMAL
MYCOPHENOLATE SERPL LC/MS/MS-MCNC: 0.5 UG/ML (ref 1–3.5)
MYCOPHENOLATE-G SERPL LC/MS/MS-MCNC: 13.5 UG/ML (ref 35–100)
NEUTROPHILS # BLD AUTO: 0.52 K/UL (ref 2–7.15)
NEUTROPHILS # BLD AUTO: 0.88 K/UL (ref 2–7.15)
NEUTROPHILS # BLD AUTO: 1.03 K/UL (ref 2–7.15)
NEUTROPHILS # BLD AUTO: 1.05 K/UL (ref 2–7.15)
NEUTROPHILS # BLD AUTO: 1.12 K/UL (ref 2–7.15)
NEUTROPHILS # BLD AUTO: 1.22 K/UL (ref 2–7.15)
NEUTROPHILS # BLD AUTO: 1.34 K/UL (ref 2–7.15)
NEUTROPHILS # BLD AUTO: 1.42 K/UL (ref 2–7.15)
NEUTROPHILS # BLD AUTO: 1.49 K/UL (ref 2–7.15)
NEUTROPHILS # BLD AUTO: 1.53 K/UL (ref 2–7.15)
NEUTROPHILS # BLD AUTO: 1.58 K/UL (ref 2–7.15)
NEUTROPHILS # BLD AUTO: 1.93 K/UL (ref 2–7.15)
NEUTROPHILS # BLD AUTO: 1.97 K/UL (ref 2–7.15)
NEUTROPHILS # BLD AUTO: 11.98 K/UL (ref 2–7.15)
NEUTROPHILS # BLD AUTO: 2.21 K/UL (ref 2–7.15)
NEUTROPHILS # BLD AUTO: 2.35 K/UL (ref 2–7.15)
NEUTROPHILS # BLD AUTO: 2.48 K/UL (ref 2–7.15)
NEUTROPHILS # BLD AUTO: 2.93 K/UL (ref 2–7.15)
NEUTROPHILS # BLD AUTO: 3.29 K/UL (ref 2–7.15)
NEUTROPHILS # BLD AUTO: 3.44 K/UL (ref 2–7.15)
NEUTROPHILS # BLD AUTO: 3.67 K/UL (ref 2–7.15)
NEUTROPHILS # BLD AUTO: 3.73 K/UL (ref 2–7.15)
NEUTROPHILS # BLD AUTO: 3.74 K/UL (ref 2–7.15)
NEUTROPHILS # BLD AUTO: 3.78 K/UL (ref 2–7.15)
NEUTROPHILS # BLD AUTO: 3.9 K/UL (ref 2–7.15)
NEUTROPHILS # BLD AUTO: 4.04 K/UL (ref 2–7.15)
NEUTROPHILS # BLD AUTO: 4.22 K/UL (ref 2–7.15)
NEUTROPHILS # BLD AUTO: 4.7 K/UL (ref 2–7.15)
NEUTROPHILS # BLD AUTO: 5.24 K/UL (ref 2–7.15)
NEUTROPHILS # BLD AUTO: 6.17 K/UL (ref 2–7.15)
NEUTROPHILS # BLD AUTO: 6.85 K/UL (ref 2–7.15)
NEUTROPHILS NFR BLD: 31.1 % (ref 44–72)
NEUTROPHILS NFR BLD: 45 % (ref 44–72)
NEUTROPHILS NFR BLD: 45.7 % (ref 44–72)
NEUTROPHILS NFR BLD: 45.8 % (ref 44–72)
NEUTROPHILS NFR BLD: 49.1 % (ref 44–72)
NEUTROPHILS NFR BLD: 50.4 % (ref 44–72)
NEUTROPHILS NFR BLD: 52.2 % (ref 44–72)
NEUTROPHILS NFR BLD: 52.5 % (ref 44–72)
NEUTROPHILS NFR BLD: 54.8 % (ref 44–72)
NEUTROPHILS NFR BLD: 55.4 % (ref 44–72)
NEUTROPHILS NFR BLD: 55.9 % (ref 44–72)
NEUTROPHILS NFR BLD: 59.1 % (ref 44–72)
NEUTROPHILS NFR BLD: 63.2 % (ref 44–72)
NEUTROPHILS NFR BLD: 63.4 % (ref 44–72)
NEUTROPHILS NFR BLD: 63.6 % (ref 44–72)
NEUTROPHILS NFR BLD: 69.3 % (ref 44–72)
NEUTROPHILS NFR BLD: 70.4 % (ref 44–72)
NEUTROPHILS NFR BLD: 70.6 % (ref 44–72)
NEUTROPHILS NFR BLD: 71.2 % (ref 44–72)
NEUTROPHILS NFR BLD: 73 % (ref 44–72)
NEUTROPHILS NFR BLD: 73.4 % (ref 44–72)
NEUTROPHILS NFR BLD: 73.4 % (ref 44–72)
NEUTROPHILS NFR BLD: 75.9 % (ref 44–72)
NEUTROPHILS NFR BLD: 78.6 % (ref 44–72)
NEUTROPHILS NFR BLD: 78.7 % (ref 44–72)
NEUTROPHILS NFR BLD: 79.1 % (ref 44–72)
NEUTROPHILS NFR BLD: 79.1 % (ref 44–72)
NEUTROPHILS NFR BLD: 79.9 % (ref 44–72)
NEUTROPHILS NFR BLD: 82.6 % (ref 44–72)
NEUTROPHILS NFR BLD: 83.1 % (ref 44–72)
NEUTROPHILS NFR BLD: 85.7 % (ref 44–72)
NITRITE UR QL STRIP.AUTO: NEGATIVE
NRBC # BLD AUTO: 0 K/UL
NRBC # BLD AUTO: 0.02 K/UL
NRBC # BLD AUTO: 0.02 K/UL
NRBC # BLD AUTO: 0.03 K/UL
NRBC BLD-RTO: 0 /100 WBC
NRBC BLD-RTO: 0.3 /100 WBC
NRBC BLD-RTO: 0.4 /100 WBC
NRBC BLD-RTO: 0.5 /100 WBC
NUCLEAR IGG SER QL IA: DETECTED
OPIATES UR QL SCN: POSITIVE
OVALOCYTES BLD QL SMEAR: NORMAL
OXYCODONE UR QL SCN: POSITIVE
PA AA BLD-ACNC: 21 % (ref 0–11)
PA AA BLD-ACNC: 44.1 % (ref 0–11)
PA AA BLD-ACNC: 94.5 % (ref 0–11)
PA AA BLD-ACNC: 95.6 % (ref 0–11)
PA ADP BLD-ACNC: 16.7 % (ref 0–17)
PA ADP BLD-ACNC: 34.7 % (ref 0–17)
PA ADP BLD-ACNC: 68 % (ref 0–17)
PA ADP BLD-ACNC: 75.3 % (ref 0–17)
PCO2 BLDV: 23.6 MMHG (ref 41–51)
PCO2 BLDV: 32.8 MMHG (ref 41–51)
PCO2 TEMP ADJ BLDV: 31.7 MMHG (ref 41–51)
PCP UR QL SCN: NEGATIVE
PH BLDV: 7.43 [PH] (ref 7.31–7.45)
PH BLDV: 7.61 [PH] (ref 7.31–7.45)
PH TEMP ADJ BLDV: 7.45 [PH] (ref 7.31–7.45)
PH UR STRIP.AUTO: 5 [PH] (ref 5–8)
PH UR STRIP.AUTO: 5.5 [PH] (ref 5–8)
PH UR STRIP.AUTO: 6 [PH] (ref 5–8)
PH UR STRIP.AUTO: 6 [PH] (ref 5–8)
PH UR STRIP.AUTO: 7 [PH] (ref 5–8)
PH UR STRIP.AUTO: 7.5 [PH] (ref 5–8)
PHOSPHATE SERPL-MCNC: 1.9 MG/DL (ref 2.5–4.5)
PHOSPHATE SERPL-MCNC: 2.3 MG/DL (ref 2.5–4.5)
PHOSPHATE SERPL-MCNC: 2.4 MG/DL (ref 2.5–4.5)
PHOSPHATE SERPL-MCNC: 3 MG/DL (ref 2.5–4.5)
PHOSPHATE SERPL-MCNC: 3.5 MG/DL (ref 2.5–4.5)
PHOSPHATE SERPL-MCNC: 3.6 MG/DL (ref 2.5–4.5)
PHOSPHATE SERPL-MCNC: 3.9 MG/DL (ref 2.5–4.5)
PHOSPHATE SERPL-MCNC: 4.2 MG/DL (ref 2.5–4.5)
PHOSPHATE SERPL-MCNC: 4.5 MG/DL (ref 2.5–4.5)
PLATELET # BLD AUTO: 100 K/UL (ref 164–446)
PLATELET # BLD AUTO: 100 K/UL (ref 164–446)
PLATELET # BLD AUTO: 102 K/UL (ref 164–446)
PLATELET # BLD AUTO: 102 K/UL (ref 164–446)
PLATELET # BLD AUTO: 103 K/UL (ref 164–446)
PLATELET # BLD AUTO: 105 K/UL (ref 164–446)
PLATELET # BLD AUTO: 105 K/UL (ref 164–446)
PLATELET # BLD AUTO: 107 K/UL (ref 164–446)
PLATELET # BLD AUTO: 107 K/UL (ref 164–446)
PLATELET # BLD AUTO: 108 K/UL (ref 164–446)
PLATELET # BLD AUTO: 111 K/UL (ref 164–446)
PLATELET # BLD AUTO: 113 K/UL (ref 164–446)
PLATELET # BLD AUTO: 114 K/UL (ref 164–446)
PLATELET # BLD AUTO: 117 K/UL (ref 164–446)
PLATELET # BLD AUTO: 119 K/UL (ref 164–446)
PLATELET # BLD AUTO: 120 K/UL (ref 164–446)
PLATELET # BLD AUTO: 123 K/UL (ref 164–446)
PLATELET # BLD AUTO: 124 K/UL (ref 164–446)
PLATELET # BLD AUTO: 128 K/UL (ref 164–446)
PLATELET # BLD AUTO: 131 K/UL (ref 164–446)
PLATELET # BLD AUTO: 137 K/UL (ref 164–446)
PLATELET # BLD AUTO: 139 K/UL (ref 164–446)
PLATELET # BLD AUTO: 140 K/UL (ref 164–446)
PLATELET # BLD AUTO: 141 K/UL (ref 164–446)
PLATELET # BLD AUTO: 146 K/UL (ref 164–446)
PLATELET # BLD AUTO: 152 K/UL (ref 164–446)
PLATELET # BLD AUTO: 161 K/UL (ref 164–446)
PLATELET # BLD AUTO: 164 K/UL (ref 164–446)
PLATELET # BLD AUTO: 166 K/UL (ref 164–446)
PLATELET # BLD AUTO: 178 K/UL (ref 164–446)
PLATELET # BLD AUTO: 231 K/UL (ref 164–446)
PLATELET # BLD AUTO: 60 K/UL (ref 164–446)
PLATELET # BLD AUTO: 64 K/UL (ref 164–446)
PLATELET # BLD AUTO: 82 K/UL (ref 164–446)
PLATELET # BLD AUTO: 83 K/UL (ref 164–446)
PLATELET # BLD AUTO: 85 K/UL (ref 164–446)
PLATELET # BLD AUTO: 87 K/UL (ref 164–446)
PLATELET # BLD AUTO: 90 K/UL (ref 164–446)
PLATELET # BLD AUTO: 92 K/UL (ref 164–446)
PLATELET # BLD AUTO: 94 K/UL (ref 164–446)
PLATELET BLD QL SMEAR: NORMAL
PLATELET BLD QL SMEAR: NORMAL
PMV BLD AUTO: 10 FL (ref 9–12.9)
PMV BLD AUTO: 10.3 FL (ref 9–12.9)
PMV BLD AUTO: 10.4 FL (ref 9–12.9)
PMV BLD AUTO: 10.5 FL (ref 9–12.9)
PMV BLD AUTO: 10.6 FL (ref 9–12.9)
PMV BLD AUTO: 10.8 FL (ref 9–12.9)
PMV BLD AUTO: 8.3 FL (ref 9–12.9)
PMV BLD AUTO: 8.4 FL (ref 9–12.9)
PMV BLD AUTO: 8.4 FL (ref 9–12.9)
PMV BLD AUTO: 8.5 FL (ref 9–12.9)
PMV BLD AUTO: 8.5 FL (ref 9–12.9)
PMV BLD AUTO: 8.6 FL (ref 9–12.9)
PMV BLD AUTO: 8.6 FL (ref 9–12.9)
PMV BLD AUTO: 8.7 FL (ref 9–12.9)
PMV BLD AUTO: 8.9 FL (ref 9–12.9)
PMV BLD AUTO: 9 FL (ref 9–12.9)
PMV BLD AUTO: 9 FL (ref 9–12.9)
PMV BLD AUTO: 9.1 FL (ref 9–12.9)
PMV BLD AUTO: 9.2 FL (ref 9–12.9)
PMV BLD AUTO: 9.3 FL (ref 9–12.9)
PMV BLD AUTO: 9.4 FL (ref 9–12.9)
PMV BLD AUTO: 9.4 FL (ref 9–12.9)
PMV BLD AUTO: 9.6 FL (ref 9–12.9)
PMV BLD AUTO: 9.7 FL (ref 9–12.9)
PMV BLD AUTO: 9.8 FL (ref 9–12.9)
PMV BLD AUTO: 9.9 FL (ref 9–12.9)
PMV BLD AUTO: 9.9 FL (ref 9–12.9)
PO2 BLDV: 158.5 MMHG (ref 25–40)
PO2 BLDV: 175.9 MMHG (ref 25–40)
PO2 TEMP ADJ BLDV: 171.6 MMHG (ref 25–40)
POIKILOCYTOSIS BLD QL SMEAR: NORMAL
POLYCHROMASIA BLD QL SMEAR: NORMAL
POTASSIUM SERPL-SCNC: 3.1 MMOL/L (ref 3.6–5.5)
POTASSIUM SERPL-SCNC: 3.4 MMOL/L (ref 3.6–5.5)
POTASSIUM SERPL-SCNC: 3.4 MMOL/L (ref 3.6–5.5)
POTASSIUM SERPL-SCNC: 3.6 MMOL/L (ref 3.6–5.5)
POTASSIUM SERPL-SCNC: 3.7 MMOL/L (ref 3.6–5.5)
POTASSIUM SERPL-SCNC: 3.7 MMOL/L (ref 3.6–5.5)
POTASSIUM SERPL-SCNC: 3.8 MMOL/L (ref 3.6–5.5)
POTASSIUM SERPL-SCNC: 3.9 MMOL/L (ref 3.6–5.5)
POTASSIUM SERPL-SCNC: 3.9 MMOL/L (ref 3.6–5.5)
POTASSIUM SERPL-SCNC: 4 MMOL/L (ref 3.6–5.5)
POTASSIUM SERPL-SCNC: 4.1 MMOL/L (ref 3.6–5.5)
POTASSIUM SERPL-SCNC: 4.1 MMOL/L (ref 3.6–5.5)
POTASSIUM SERPL-SCNC: 4.2 MMOL/L (ref 3.6–5.5)
POTASSIUM SERPL-SCNC: 4.3 MMOL/L (ref 3.6–5.5)
POTASSIUM SERPL-SCNC: 4.4 MMOL/L (ref 3.6–5.5)
POTASSIUM SERPL-SCNC: 4.5 MMOL/L (ref 3.6–5.5)
POTASSIUM SERPL-SCNC: 4.6 MMOL/L (ref 3.6–5.5)
POTASSIUM SERPL-SCNC: 4.7 MMOL/L (ref 3.6–5.5)
PRODUCT TYPE UPROD: NORMAL
PROPOXYPH UR QL SCN: NEGATIVE
PROT SERPL-MCNC: 4.4 G/DL (ref 6–8.2)
PROT SERPL-MCNC: 4.6 G/DL (ref 6–8.2)
PROT SERPL-MCNC: 4.8 G/DL (ref 6–8.2)
PROT SERPL-MCNC: 4.9 G/DL (ref 6–8.2)
PROT SERPL-MCNC: 5 G/DL (ref 6–8.2)
PROT SERPL-MCNC: 5.1 G/DL (ref 6–8.2)
PROT SERPL-MCNC: 5.2 G/DL (ref 6–8.2)
PROT SERPL-MCNC: 5.3 G/DL (ref 6–8.2)
PROT SERPL-MCNC: 5.4 G/DL (ref 6–8.2)
PROT SERPL-MCNC: 5.4 G/DL (ref 6–8.2)
PROT SERPL-MCNC: 5.5 G/DL (ref 6–8.2)
PROT SERPL-MCNC: 5.5 G/DL (ref 6–8.2)
PROT SERPL-MCNC: 5.6 G/DL (ref 6.3–8.2)
PROT SERPL-MCNC: 5.6 G/DL (ref 6–8.2)
PROT SERPL-MCNC: 5.7 G/DL (ref 6–8.2)
PROT SERPL-MCNC: 5.7 G/DL (ref 6–8.2)
PROT SERPL-MCNC: 5.8 G/DL (ref 6–8.2)
PROT SERPL-MCNC: 5.8 G/DL (ref 6–8.2)
PROT SERPL-MCNC: 6.1 G/DL (ref 6–8.2)
PROT SERPL-MCNC: 6.2 G/DL (ref 6–8.2)
PROT SERPL-MCNC: 6.3 G/DL (ref 6–8.2)
PROT SERPL-MCNC: 6.3 G/DL (ref 6–8.2)
PROT SERPL-MCNC: 6.4 G/DL (ref 6–8.2)
PROT SERPL-MCNC: 6.6 G/DL (ref 6–8.2)
PROT SERPL-MCNC: 6.6 G/DL (ref 6–8.2)
PROT SERPL-MCNC: 7.2 G/DL (ref 6–8.2)
PROT UR QL STRIP: 100 MG/DL
PROT UR QL STRIP: 100 MG/DL
PROT UR QL STRIP: NEGATIVE MG/DL
PROTHROMBIN TIME: 11.6 SEC (ref 12–14.6)
PROTHROMBIN TIME: 11.9 SEC (ref 12–14.6)
PROTHROMBIN TIME: 12.4 SEC (ref 12–14.6)
PROTHROMBIN TIME: 13.6 SEC (ref 12–14.6)
PTH-INTACT SERPL-MCNC: 84.6 PG/ML (ref 14–72)
RBC # BLD AUTO: 1.96 M/UL (ref 4.2–5.4)
RBC # BLD AUTO: 2.29 M/UL (ref 4.2–5.4)
RBC # BLD AUTO: 2.46 M/UL (ref 4.2–5.4)
RBC # BLD AUTO: 2.49 M/UL (ref 4.2–5.4)
RBC # BLD AUTO: 2.51 M/UL (ref 4.2–5.4)
RBC # BLD AUTO: 2.53 M/UL (ref 4.2–5.4)
RBC # BLD AUTO: 2.56 M/UL (ref 4.2–5.4)
RBC # BLD AUTO: 2.56 M/UL (ref 4.2–5.4)
RBC # BLD AUTO: 2.65 M/UL (ref 4.2–5.4)
RBC # BLD AUTO: 2.69 M/UL (ref 4.2–5.4)
RBC # BLD AUTO: 2.86 M/UL (ref 4.2–5.4)
RBC # BLD AUTO: 2.88 M/UL (ref 4.2–5.4)
RBC # BLD AUTO: 2.91 M/UL (ref 4.2–5.4)
RBC # BLD AUTO: 2.93 M/UL (ref 4.2–5.4)
RBC # BLD AUTO: 2.99 M/UL (ref 4.2–5.4)
RBC # BLD AUTO: 3 M/UL (ref 4.2–5.4)
RBC # BLD AUTO: 3.02 M/UL (ref 4.2–5.4)
RBC # BLD AUTO: 3.1 M/UL (ref 4.2–5.4)
RBC # BLD AUTO: 3.22 M/UL (ref 4.2–5.4)
RBC # BLD AUTO: 3.22 M/UL (ref 4.2–5.4)
RBC # BLD AUTO: 3.26 M/UL (ref 4.2–5.4)
RBC # BLD AUTO: 3.27 M/UL (ref 4.2–5.4)
RBC # BLD AUTO: 3.43 M/UL (ref 4.2–5.4)
RBC # BLD AUTO: 3.47 M/UL (ref 4.2–5.4)
RBC # BLD AUTO: 3.51 M/UL (ref 4.2–5.4)
RBC # BLD AUTO: 3.56 M/UL (ref 4.2–5.4)
RBC # BLD AUTO: 3.63 M/UL (ref 4.2–5.4)
RBC # BLD AUTO: 3.63 M/UL (ref 4.2–5.4)
RBC # BLD AUTO: 3.66 M/UL (ref 4.2–5.4)
RBC # BLD AUTO: 3.73 M/UL (ref 4.2–5.4)
RBC # BLD AUTO: 3.87 M/UL (ref 4.2–5.4)
RBC # BLD AUTO: 3.88 M/UL (ref 4.2–5.4)
RBC # BLD AUTO: 3.91 M/UL (ref 4.2–5.4)
RBC # BLD AUTO: 3.93 M/UL (ref 4.2–5.4)
RBC # BLD AUTO: 4.01 M/UL (ref 4.2–5.4)
RBC # BLD AUTO: 4.05 M/UL (ref 4.2–5.4)
RBC # BLD AUTO: 4.06 M/UL (ref 4.2–5.4)
RBC # BLD AUTO: 4.13 M/UL (ref 4.2–5.4)
RBC # BLD AUTO: 4.77 M/UL (ref 4.2–5.4)
RBC # BLD AUTO: 4.79 M/UL (ref 4.2–5.4)
RBC # URNS HPF: ABNORMAL /HPF
RBC # URNS HPF: NORMAL /HPF
RBC BLD AUTO: PRESENT
RBC BLD AUTO: PRESENT
RBC UR QL AUTO: ABNORMAL
RBC UR QL AUTO: NEGATIVE
RH BLD: NORMAL
RSV RNA SPEC QL NAA+PROBE: NEGATIVE
RSV RNA SPEC QL NAA+PROBE: NEGATIVE
SALICYLATES SERPL-MCNC: <1 MG/DL (ref 15–25)
SAO2 % BLDV: 97.3 %
SAO2 % BLDV: 98.2 %
SARS-COV+SARS-COV-2 AG RESP QL IA.RAPID: NOTDETECTED
SARS-COV-2 RNA RESP QL NAA+PROBE: NOTDETECTED
SARS-COV-2 RNA RESP QL NAA+PROBE: NOTDETECTED
SIGNIFICANT IND 70042: NORMAL
SITE SITE: NORMAL
SODIUM SERPL-SCNC: 131 MMOL/L (ref 135–145)
SODIUM SERPL-SCNC: 131 MMOL/L (ref 135–145)
SODIUM SERPL-SCNC: 132 MMOL/L (ref 135–145)
SODIUM SERPL-SCNC: 135 MMOL/L (ref 135–145)
SODIUM SERPL-SCNC: 137 MMOL/L (ref 135–145)
SODIUM SERPL-SCNC: 138 MMOL/L (ref 135–145)
SODIUM SERPL-SCNC: 139 MMOL/L (ref 135–145)
SODIUM SERPL-SCNC: 140 MMOL/L (ref 135–145)
SODIUM SERPL-SCNC: 141 MMOL/L (ref 135–145)
SODIUM SERPL-SCNC: 141 MMOL/L (ref 135–145)
SODIUM SERPL-SCNC: 142 MMOL/L (ref 135–145)
SODIUM SERPL-SCNC: 143 MMOL/L (ref 135–145)
SODIUM SERPL-SCNC: 144 MMOL/L (ref 135–145)
SOURCE SOURCE: NORMAL
SP GR UR STRIP.AUTO: 1.01
SP GR UR STRIP.AUTO: 1.02
SPECIMEN SOURCE: NORMAL
T4 FREE SERPL-MCNC: 1.1 NG/DL (ref 0.93–1.7)
TACROLIMUS BLD-MCNC: 2.2 NG/ML
TACROLIMUS BLD-MCNC: 2.3 NG/ML
TACROLIMUS BLD-MCNC: 4.3 NG/ML
TACROLIMUS BLD-MCNC: <2 NG/ML
TEG ALGORITHM TGALG: ABNORMAL
TIBC SERPL-MCNC: 105 UG/DL (ref 250–450)
TIBC SERPL-MCNC: 128 UG/DL (ref 250–450)
TIBC SERPL-MCNC: 147 UG/DL (ref 250–450)
TIBC SERPL-MCNC: 152 UG/DL (ref 250–450)
TIBC SERPL-MCNC: 157 UG/DL (ref 250–450)
TIBC SERPL-MCNC: 290 UG/DL (ref 250–450)
TROPONIN T SERPL-MCNC: 12 NG/L (ref 6–19)
TROPONIN T SERPL-MCNC: 13 NG/L (ref 6–19)
TROPONIN T SERPL-MCNC: 23 NG/L (ref 6–19)
TROPONIN T SERPL-MCNC: 27 NG/L (ref 6–19)
TROPONIN T SERPL-MCNC: 36 NG/L (ref 6–19)
TSH SERPL DL<=0.005 MIU/L-ACNC: 0.37 UIU/ML (ref 0.38–5.33)
TSH SERPL DL<=0.005 MIU/L-ACNC: 1.13 UIU/ML (ref 0.38–5.33)
UIBC SERPL-MCNC: 121 UG/DL (ref 110–370)
UIBC SERPL-MCNC: 135 UG/DL (ref 110–370)
UIBC SERPL-MCNC: 233 UG/DL (ref 110–370)
UIBC SERPL-MCNC: 28 UG/DL (ref 110–370)
UIBC SERPL-MCNC: 77 UG/DL (ref 110–370)
UIBC SERPL-MCNC: 91 UG/DL (ref 110–370)
UNIT STATUS USTAT: NORMAL
UROBILINOGEN UR STRIP.AUTO-MCNC: 0.2 MG/DL
VIT B1 BLD-MCNC: 94 NMOL/L (ref 70–180)
VIT B12 SERPL-MCNC: 312 PG/ML (ref 211–911)
VIT B12 SERPL-MCNC: 519 PG/ML (ref 211–911)
WBC # BLD AUTO: 1.7 K/UL (ref 4.8–10.8)
WBC # BLD AUTO: 1.8 K/UL (ref 4.8–10.8)
WBC # BLD AUTO: 14 K/UL (ref 4.8–10.8)
WBC # BLD AUTO: 2 K/UL (ref 4.8–10.8)
WBC # BLD AUTO: 2.3 K/UL (ref 4.8–10.8)
WBC # BLD AUTO: 2.5 K/UL (ref 4.8–10.8)
WBC # BLD AUTO: 2.5 K/UL (ref 4.8–10.8)
WBC # BLD AUTO: 2.7 K/UL (ref 4.8–10.8)
WBC # BLD AUTO: 2.8 K/UL (ref 4.8–10.8)
WBC # BLD AUTO: 2.9 K/UL (ref 4.8–10.8)
WBC # BLD AUTO: 2.9 K/UL (ref 4.8–10.8)
WBC # BLD AUTO: 3 K/UL (ref 4.8–10.8)
WBC # BLD AUTO: 3 K/UL (ref 4.8–10.8)
WBC # BLD AUTO: 3.1 K/UL (ref 4.8–10.8)
WBC # BLD AUTO: 3.5 K/UL (ref 4.8–10.8)
WBC # BLD AUTO: 3.6 K/UL (ref 4.8–10.8)
WBC # BLD AUTO: 3.7 K/UL (ref 4.8–10.8)
WBC # BLD AUTO: 3.7 K/UL (ref 4.8–10.8)
WBC # BLD AUTO: 4 K/UL (ref 4.8–10.8)
WBC # BLD AUTO: 4.1 K/UL (ref 4.8–10.8)
WBC # BLD AUTO: 4.1 K/UL (ref 4.8–10.8)
WBC # BLD AUTO: 4.2 K/UL (ref 4.8–10.8)
WBC # BLD AUTO: 4.3 K/UL (ref 4.8–10.8)
WBC # BLD AUTO: 4.4 K/UL (ref 4.8–10.8)
WBC # BLD AUTO: 4.7 K/UL (ref 4.8–10.8)
WBC # BLD AUTO: 4.7 K/UL (ref 4.8–10.8)
WBC # BLD AUTO: 4.8 K/UL (ref 4.8–10.8)
WBC # BLD AUTO: 5 K/UL (ref 4.8–10.8)
WBC # BLD AUTO: 5.1 K/UL (ref 4.8–10.8)
WBC # BLD AUTO: 5.5 K/UL (ref 4.8–10.8)
WBC # BLD AUTO: 5.7 K/UL (ref 4.8–10.8)
WBC # BLD AUTO: 5.8 K/UL (ref 4.8–10.8)
WBC # BLD AUTO: 6 K/UL (ref 4.8–10.8)
WBC # BLD AUTO: 6.6 K/UL (ref 4.8–10.8)
WBC # BLD AUTO: 6.9 K/UL (ref 4.8–10.8)
WBC # BLD AUTO: 7.5 K/UL (ref 4.8–10.8)
WBC # BLD AUTO: 8.3 K/UL (ref 4.8–10.8)
WBC # BLD AUTO: 9.4 K/UL (ref 4.8–10.8)
WBC #/AREA URNS HPF: ABNORMAL /HPF
WBC #/AREA URNS HPF: NORMAL /HPF

## 2022-01-01 PROCEDURE — P9016 RBC LEUKOCYTES REDUCED: HCPCS

## 2022-01-01 PROCEDURE — A9270 NON-COVERED ITEM OR SERVICE: HCPCS | Performed by: NURSE PRACTITIONER

## 2022-01-01 PROCEDURE — 36430 TRANSFUSION BLD/BLD COMPNT: CPT

## 2022-01-01 PROCEDURE — 99233 SBSQ HOSP IP/OBS HIGH 50: CPT | Performed by: GENERAL PRACTICE

## 2022-01-01 PROCEDURE — 82962 GLUCOSE BLOOD TEST: CPT | Mod: 91

## 2022-01-01 PROCEDURE — 3E0234Z INTRODUCTION OF SERUM, TOXOID AND VACCINE INTO MUSCLE, PERCUTANEOUS APPROACH: ICD-10-PCS | Performed by: EMERGENCY MEDICINE

## 2022-01-01 PROCEDURE — A9270 NON-COVERED ITEM OR SERVICE: HCPCS | Performed by: HOSPITALIST

## 2022-01-01 PROCEDURE — 83735 ASSAY OF MAGNESIUM: CPT

## 2022-01-01 PROCEDURE — 700105 HCHG RX REV CODE 258: Performed by: INTERNAL MEDICINE

## 2022-01-01 PROCEDURE — 85025 COMPLETE CBC W/AUTO DIFF WBC: CPT

## 2022-01-01 PROCEDURE — 99222 1ST HOSP IP/OBS MODERATE 55: CPT | Mod: GC | Performed by: PSYCHIATRY & NEUROLOGY

## 2022-01-01 PROCEDURE — 99233 SBSQ HOSP IP/OBS HIGH 50: CPT | Performed by: SURGERY

## 2022-01-01 PROCEDURE — 700102 HCHG RX REV CODE 250 W/ 637 OVERRIDE(OP): Performed by: NURSE PRACTITIONER

## 2022-01-01 PROCEDURE — 85027 COMPLETE CBC AUTOMATED: CPT

## 2022-01-01 PROCEDURE — 700102 HCHG RX REV CODE 250 W/ 637 OVERRIDE(OP): Performed by: FAMILY MEDICINE

## 2022-01-01 PROCEDURE — 99223 1ST HOSP IP/OBS HIGH 75: CPT | Performed by: PHYSICAL MEDICINE & REHABILITATION

## 2022-01-01 PROCEDURE — 99213 OFFICE O/P EST LOW 20 MIN: CPT | Performed by: FAMILY MEDICINE

## 2022-01-01 PROCEDURE — 700111 HCHG RX REV CODE 636 W/ 250 OVERRIDE (IP): Performed by: FAMILY MEDICINE

## 2022-01-01 PROCEDURE — 700111 HCHG RX REV CODE 636 W/ 250 OVERRIDE (IP): Performed by: NURSE PRACTITIONER

## 2022-01-01 PROCEDURE — 700102 HCHG RX REV CODE 250 W/ 637 OVERRIDE(OP): Performed by: HOSPITALIST

## 2022-01-01 PROCEDURE — 30233N1 TRANSFUSION OF NONAUTOLOGOUS RED BLOOD CELLS INTO PERIPHERAL VEIN, PERCUTANEOUS APPROACH: ICD-10-PCS | Performed by: SURGERY

## 2022-01-01 PROCEDURE — 86923 COMPATIBILITY TEST ELECTRIC: CPT

## 2022-01-01 PROCEDURE — 87077 CULTURE AEROBIC IDENTIFY: CPT

## 2022-01-01 PROCEDURE — 85347 COAGULATION TIME ACTIVATED: CPT

## 2022-01-01 PROCEDURE — 82272 OCCULT BLD FECES 1-3 TESTS: CPT

## 2022-01-01 PROCEDURE — C9254 INJECTION, LACOSAMIDE: HCPCS | Performed by: HOSPITALIST

## 2022-01-01 PROCEDURE — 700101 HCHG RX REV CODE 250: Performed by: EMERGENCY MEDICINE

## 2022-01-01 PROCEDURE — 96374 THER/PROPH/DIAG INJ IV PUSH: CPT

## 2022-01-01 PROCEDURE — 84484 ASSAY OF TROPONIN QUANT: CPT

## 2022-01-01 PROCEDURE — 700102 HCHG RX REV CODE 250 W/ 637 OVERRIDE(OP): Performed by: INTERNAL MEDICINE

## 2022-01-01 PROCEDURE — 700111 HCHG RX REV CODE 636 W/ 250 OVERRIDE (IP): Performed by: SURGERY

## 2022-01-01 PROCEDURE — 83690 ASSAY OF LIPASE: CPT

## 2022-01-01 PROCEDURE — 99285 EMERGENCY DEPT VISIT HI MDM: CPT

## 2022-01-01 PROCEDURE — A9270 NON-COVERED ITEM OR SERVICE: HCPCS | Performed by: SURGERY

## 2022-01-01 PROCEDURE — 302970 POC BREATHALIZER: Performed by: EMERGENCY MEDICINE

## 2022-01-01 PROCEDURE — 80053 COMPREHEN METABOLIC PANEL: CPT

## 2022-01-01 PROCEDURE — 700111 HCHG RX REV CODE 636 W/ 250 OVERRIDE (IP): Performed by: HOSPITALIST

## 2022-01-01 PROCEDURE — 82977 ASSAY OF GGT: CPT

## 2022-01-01 PROCEDURE — 83550 IRON BINDING TEST: CPT

## 2022-01-01 PROCEDURE — 96376 TX/PRO/DX INJ SAME DRUG ADON: CPT

## 2022-01-01 PROCEDURE — 83605 ASSAY OF LACTIC ACID: CPT

## 2022-01-01 PROCEDURE — 160048 HCHG OR STATISTICAL LEVEL 1-5: Performed by: INTERNAL MEDICINE

## 2022-01-01 PROCEDURE — A9270 NON-COVERED ITEM OR SERVICE: HCPCS | Performed by: EMERGENCY MEDICINE

## 2022-01-01 PROCEDURE — 96133 NRPSYC TST EVAL PHYS/QHP EA: CPT | Performed by: CLINICAL NEUROPSYCHOLOGIST

## 2022-01-01 PROCEDURE — 01210 ANES OPEN PX HIP JOINT NOS: CPT | Performed by: ANESTHESIOLOGY

## 2022-01-01 PROCEDURE — 700101 HCHG RX REV CODE 250: Performed by: INTERNAL MEDICINE

## 2022-01-01 PROCEDURE — 700105 HCHG RX REV CODE 258: Performed by: EMERGENCY MEDICINE

## 2022-01-01 PROCEDURE — 94760 N-INVAS EAR/PLS OXIMETRY 1: CPT

## 2022-01-01 PROCEDURE — 96365 THER/PROPH/DIAG IV INF INIT: CPT | Mod: XU

## 2022-01-01 PROCEDURE — 87086 URINE CULTURE/COLONY COUNT: CPT

## 2022-01-01 PROCEDURE — 700102 HCHG RX REV CODE 250 W/ 637 OVERRIDE(OP): Performed by: SURGERY

## 2022-01-01 PROCEDURE — 82728 ASSAY OF FERRITIN: CPT

## 2022-01-01 PROCEDURE — 99223 1ST HOSP IP/OBS HIGH 75: CPT | Mod: AI | Performed by: HOSPITALIST

## 2022-01-01 PROCEDURE — 83540 ASSAY OF IRON: CPT

## 2022-01-01 PROCEDURE — 70450 CT HEAD/BRAIN W/O DYE: CPT

## 2022-01-01 PROCEDURE — 94664 DEMO&/EVAL PT USE INHALER: CPT

## 2022-01-01 PROCEDURE — 99233 SBSQ HOSP IP/OBS HIGH 50: CPT | Performed by: FAMILY MEDICINE

## 2022-01-01 PROCEDURE — 96375 TX/PRO/DX INJ NEW DRUG ADDON: CPT

## 2022-01-01 PROCEDURE — 86850 RBC ANTIBODY SCREEN: CPT

## 2022-01-01 PROCEDURE — 770022 HCHG ROOM/CARE - ICU (200)

## 2022-01-01 PROCEDURE — 85576 BLOOD PLATELET AGGREGATION: CPT

## 2022-01-01 PROCEDURE — 700105 HCHG RX REV CODE 258: Performed by: SURGERY

## 2022-01-01 PROCEDURE — RXMED WILLOW AMBULATORY MEDICATION CHARGE: Performed by: FAMILY MEDICINE

## 2022-01-01 PROCEDURE — 99223 1ST HOSP IP/OBS HIGH 75: CPT | Performed by: HOSPITALIST

## 2022-01-01 PROCEDURE — 36415 COLL VENOUS BLD VENIPUNCTURE: CPT

## 2022-01-01 PROCEDURE — 86901 BLOOD TYPING SEROLOGIC RH(D): CPT

## 2022-01-01 PROCEDURE — 700111 HCHG RX REV CODE 636 W/ 250 OVERRIDE (IP): Performed by: INTERNAL MEDICINE

## 2022-01-01 PROCEDURE — 160002 HCHG RECOVERY MINUTES (STAT): Performed by: INTERNAL MEDICINE

## 2022-01-01 PROCEDURE — 97163 PT EVAL HIGH COMPLEX 45 MIN: CPT

## 2022-01-01 PROCEDURE — 82962 GLUCOSE BLOOD TEST: CPT

## 2022-01-01 PROCEDURE — 700105 HCHG RX REV CODE 258: Performed by: HOSPITALIST

## 2022-01-01 PROCEDURE — 99232 SBSQ HOSP IP/OBS MODERATE 35: CPT | Performed by: FAMILY MEDICINE

## 2022-01-01 PROCEDURE — 82077 ASSAY SPEC XCP UR&BREATH IA: CPT

## 2022-01-01 PROCEDURE — 74176 CT ABD & PELVIS W/O CONTRAST: CPT | Mod: ME

## 2022-01-01 PROCEDURE — 80197 ASSAY OF TACROLIMUS: CPT

## 2022-01-01 PROCEDURE — 93005 ELECTROCARDIOGRAM TRACING: CPT | Performed by: FAMILY MEDICINE

## 2022-01-01 PROCEDURE — 76700 US EXAM ABDOM COMPLETE: CPT

## 2022-01-01 PROCEDURE — 71045 X-RAY EXAM CHEST 1 VIEW: CPT

## 2022-01-01 PROCEDURE — 92610 EVALUATE SWALLOWING FUNCTION: CPT

## 2022-01-01 PROCEDURE — 82803 BLOOD GASES ANY COMBINATION: CPT

## 2022-01-01 PROCEDURE — 84100 ASSAY OF PHOSPHORUS: CPT

## 2022-01-01 PROCEDURE — 70450 CT HEAD/BRAIN W/O DYE: CPT | Mod: ME

## 2022-01-01 PROCEDURE — 96372 THER/PROPH/DIAG INJ SC/IM: CPT

## 2022-01-01 PROCEDURE — 97535 SELF CARE MNGMENT TRAINING: CPT | Mod: CO

## 2022-01-01 PROCEDURE — 30233N1 TRANSFUSION OF NONAUTOLOGOUS RED BLOOD CELLS INTO PERIPHERAL VEIN, PERCUTANEOUS APPROACH: ICD-10-PCS | Performed by: HOSPITALIST

## 2022-01-01 PROCEDURE — 700102 HCHG RX REV CODE 250 W/ 637 OVERRIDE(OP): Performed by: EMERGENCY MEDICINE

## 2022-01-01 PROCEDURE — 770020 HCHG ROOM/CARE - TELE (206)

## 2022-01-01 PROCEDURE — 770001 HCHG ROOM/CARE - MED/SURG/GYN PRIV*

## 2022-01-01 PROCEDURE — 770006 HCHG ROOM/CARE - MED/SURG/GYN SEMI*

## 2022-01-01 PROCEDURE — A9270 NON-COVERED ITEM OR SERVICE: HCPCS | Performed by: INTERNAL MEDICINE

## 2022-01-01 PROCEDURE — 72131 CT LUMBAR SPINE W/O DYE: CPT

## 2022-01-01 PROCEDURE — G1004 CDSM NDSC: HCPCS

## 2022-01-01 PROCEDURE — 81001 URINALYSIS AUTO W/SCOPE: CPT

## 2022-01-01 PROCEDURE — 305308 HCHG STAPLER,SKIN,DISP.

## 2022-01-01 PROCEDURE — 72125 CT NECK SPINE W/O DYE: CPT | Mod: ME

## 2022-01-01 PROCEDURE — 76942 ECHO GUIDE FOR BIOPSY: CPT | Mod: 26 | Performed by: ANESTHESIOLOGY

## 2022-01-01 PROCEDURE — 304217 HCHG IRRIGATION SYSTEM

## 2022-01-01 PROCEDURE — A9270 NON-COVERED ITEM OR SERVICE: HCPCS | Performed by: FAMILY MEDICINE

## 2022-01-01 PROCEDURE — 74177 CT ABD & PELVIS W/CONTRAST: CPT | Mod: MG

## 2022-01-01 PROCEDURE — 86038 ANTINUCLEAR ANTIBODIES: CPT

## 2022-01-01 PROCEDURE — 160035 HCHG PACU - 1ST 60 MINS PHASE I: Performed by: STUDENT IN AN ORGANIZED HEALTH CARE EDUCATION/TRAINING PROGRAM

## 2022-01-01 PROCEDURE — G0378 HOSPITAL OBSERVATION PER HR: HCPCS

## 2022-01-01 PROCEDURE — 81003 URINALYSIS AUTO W/O SCOPE: CPT

## 2022-01-01 PROCEDURE — 74177 CT ABD & PELVIS W/CONTRAST: CPT | Mod: ME

## 2022-01-01 PROCEDURE — G0390 TRAUMA RESPONS W/HOSP CRITI: HCPCS

## 2022-01-01 PROCEDURE — 160035 HCHG PACU - 1ST 60 MINS PHASE I: Performed by: INTERNAL MEDICINE

## 2022-01-01 PROCEDURE — A9270 NON-COVERED ITEM OR SERVICE: HCPCS | Performed by: STUDENT IN AN ORGANIZED HEALTH CARE EDUCATION/TRAINING PROGRAM

## 2022-01-01 PROCEDURE — 93005 ELECTROCARDIOGRAM TRACING: CPT | Performed by: EMERGENCY MEDICINE

## 2022-01-01 PROCEDURE — 160009 HCHG ANES TIME/MIN: Performed by: INTERNAL MEDICINE

## 2022-01-01 PROCEDURE — 93970 EXTREMITY STUDY: CPT | Mod: 26,GZ | Performed by: INTERNAL MEDICINE

## 2022-01-01 PROCEDURE — 97530 THERAPEUTIC ACTIVITIES: CPT

## 2022-01-01 PROCEDURE — 99223 1ST HOSP IP/OBS HIGH 75: CPT | Mod: AI | Performed by: INTERNAL MEDICINE

## 2022-01-01 PROCEDURE — 700101 HCHG RX REV CODE 250: Performed by: HOSPITALIST

## 2022-01-01 PROCEDURE — 99214 OFFICE O/P EST MOD 30 MIN: CPT | Performed by: INTERNAL MEDICINE

## 2022-01-01 PROCEDURE — 99239 HOSP IP/OBS DSCHRG MGMT >30: CPT | Performed by: STUDENT IN AN ORGANIZED HEALTH CARE EDUCATION/TRAINING PROGRAM

## 2022-01-01 PROCEDURE — 94002 VENT MGMT INPAT INIT DAY: CPT

## 2022-01-01 PROCEDURE — 99291 CRITICAL CARE FIRST HOUR: CPT | Performed by: SURGERY

## 2022-01-01 PROCEDURE — 73110 X-RAY EXAM OF WRIST: CPT | Mod: RT

## 2022-01-01 PROCEDURE — 71260 CT THORAX DX C+: CPT

## 2022-01-01 PROCEDURE — 87493 C DIFF AMPLIFIED PROBE: CPT

## 2022-01-01 PROCEDURE — 700111 HCHG RX REV CODE 636 W/ 250 OVERRIDE (IP): Performed by: STUDENT IN AN ORGANIZED HEALTH CARE EDUCATION/TRAINING PROGRAM

## 2022-01-01 PROCEDURE — 99238 HOSP IP/OBS DSCHRG MGMT 30/<: CPT | Performed by: SURGERY

## 2022-01-01 PROCEDURE — 302874 HCHG BANDAGE ACE 2 OR 3""

## 2022-01-01 PROCEDURE — 99283 EMERGENCY DEPT VISIT LOW MDM: CPT

## 2022-01-01 PROCEDURE — 0HQ0XZZ REPAIR SCALP SKIN, EXTERNAL APPROACH: ICD-10-PCS | Performed by: EMERGENCY MEDICINE

## 2022-01-01 PROCEDURE — 99292 CRITICAL CARE ADDL 30 MIN: CPT | Performed by: SURGERY

## 2022-01-01 PROCEDURE — 97166 OT EVAL MOD COMPLEX 45 MIN: CPT

## 2022-01-01 PROCEDURE — 87186 SC STD MICRODIL/AGAR DIL: CPT

## 2022-01-01 PROCEDURE — 700102 HCHG RX REV CODE 250 W/ 637 OVERRIDE(OP): Performed by: STUDENT IN AN ORGANIZED HEALTH CARE EDUCATION/TRAINING PROGRAM

## 2022-01-01 PROCEDURE — 80048 BASIC METABOLIC PNL TOTAL CA: CPT

## 2022-01-01 PROCEDURE — 99284 EMERGENCY DEPT VISIT MOD MDM: CPT

## 2022-01-01 PROCEDURE — 51798 US URINE CAPACITY MEASURE: CPT

## 2022-01-01 PROCEDURE — 99024 POSTOP FOLLOW-UP VISIT: CPT | Performed by: STUDENT IN AN ORGANIZED HEALTH CARE EDUCATION/TRAINING PROGRAM

## 2022-01-01 PROCEDURE — 0DJ08ZZ INSPECTION OF UPPER INTESTINAL TRACT, VIA NATURAL OR ARTIFICIAL OPENING ENDOSCOPIC: ICD-10-PCS | Performed by: INTERNAL MEDICINE

## 2022-01-01 PROCEDURE — 92523 SPEECH SOUND LANG COMPREHEN: CPT

## 2022-01-01 PROCEDURE — 73070 X-RAY EXAM OF ELBOW: CPT | Mod: RT

## 2022-01-01 PROCEDURE — 73501 X-RAY EXAM HIP UNI 1 VIEW: CPT | Mod: RT

## 2022-01-01 PROCEDURE — 85384 FIBRINOGEN ACTIVITY: CPT

## 2022-01-01 PROCEDURE — 82010 KETONE BODYS QUAN: CPT

## 2022-01-01 PROCEDURE — 87426 SARSCOV CORONAVIRUS AG IA: CPT

## 2022-01-01 PROCEDURE — P9034 PLATELETS, PHERESIS: HCPCS

## 2022-01-01 PROCEDURE — 99233 SBSQ HOSP IP/OBS HIGH 50: CPT | Performed by: PHYSICIAN ASSISTANT

## 2022-01-01 PROCEDURE — 51701 INSERT BLADDER CATHETER: CPT

## 2022-01-01 PROCEDURE — A9540 TC99M MAA: HCPCS

## 2022-01-01 PROCEDURE — 99233 SBSQ HOSP IP/OBS HIGH 50: CPT | Performed by: STUDENT IN AN ORGANIZED HEALTH CARE EDUCATION/TRAINING PROGRAM

## 2022-01-01 PROCEDURE — 302214 INTUBATION BOX: Performed by: EMERGENCY MEDICINE

## 2022-01-01 PROCEDURE — 97162 PT EVAL MOD COMPLEX 30 MIN: CPT

## 2022-01-01 PROCEDURE — 700111 HCHG RX REV CODE 636 W/ 250 OVERRIDE (IP): Performed by: EMERGENCY MEDICINE

## 2022-01-01 PROCEDURE — 82140 ASSAY OF AMMONIA: CPT

## 2022-01-01 PROCEDURE — 99239 HOSP IP/OBS DSCHRG MGMT >30: CPT | Performed by: HOSPITALIST

## 2022-01-01 PROCEDURE — 160009 HCHG ANES TIME/MIN: Performed by: STUDENT IN AN ORGANIZED HEALTH CARE EDUCATION/TRAINING PROGRAM

## 2022-01-01 PROCEDURE — 99291 CRITICAL CARE FIRST HOUR: CPT

## 2022-01-01 PROCEDURE — 99212 OFFICE O/P EST SF 10 MIN: CPT | Performed by: PSYCHIATRY & NEUROLOGY

## 2022-01-01 PROCEDURE — 97535 SELF CARE MNGMENT TRAINING: CPT

## 2022-01-01 PROCEDURE — 99217 PR OBSERVATION CARE DISCHARGE: CPT | Performed by: INTERNAL MEDICINE

## 2022-01-01 PROCEDURE — 73030 X-RAY EXAM OF SHOULDER: CPT | Mod: RT

## 2022-01-01 PROCEDURE — 303105 HCHG CATHETER EXTRA

## 2022-01-01 PROCEDURE — G0493 RN CARE EA 15 MIN HH/HOSPICE: HCPCS

## 2022-01-01 PROCEDURE — 72148 MRI LUMBAR SPINE W/O DYE: CPT

## 2022-01-01 PROCEDURE — 93306 TTE W/DOPPLER COMPLETE: CPT

## 2022-01-01 PROCEDURE — 97165 OT EVAL LOW COMPLEX 30 MIN: CPT

## 2022-01-01 PROCEDURE — 80143 DRUG ASSAY ACETAMINOPHEN: CPT

## 2022-01-01 PROCEDURE — 303620 HCHG EPISTAXIS CONTROL

## 2022-01-01 PROCEDURE — 82164 ANGIOTENSIN I ENZYME TEST: CPT

## 2022-01-01 PROCEDURE — 93970 EXTREMITY STUDY: CPT

## 2022-01-01 PROCEDURE — 700105 HCHG RX REV CODE 258: Performed by: NURSE PRACTITIONER

## 2022-01-01 PROCEDURE — 82784 ASSAY IGA/IGD/IGG/IGM EACH: CPT

## 2022-01-01 PROCEDURE — 96136 PSYCL/NRPSYC TST PHY/QHP 1ST: CPT | Performed by: CLINICAL NEUROPSYCHOLOGIST

## 2022-01-01 PROCEDURE — 99214 OFFICE O/P EST MOD 30 MIN: CPT | Performed by: FAMILY MEDICINE

## 2022-01-01 PROCEDURE — 84443 ASSAY THYROID STIM HORMONE: CPT

## 2022-01-01 PROCEDURE — 700105 HCHG RX REV CODE 258: Performed by: STUDENT IN AN ORGANIZED HEALTH CARE EDUCATION/TRAINING PROGRAM

## 2022-01-01 PROCEDURE — 700101 HCHG RX REV CODE 250: Performed by: SURGERY

## 2022-01-01 PROCEDURE — 160002 HCHG RECOVERY MINUTES (STAT): Performed by: STUDENT IN AN ORGANIZED HEALTH CARE EDUCATION/TRAINING PROGRAM

## 2022-01-01 PROCEDURE — 82330 ASSAY OF CALCIUM: CPT

## 2022-01-01 PROCEDURE — 700102 HCHG RX REV CODE 250 W/ 637 OVERRIDE(OP): Performed by: GENERAL PRACTICE

## 2022-01-01 PROCEDURE — 99232 SBSQ HOSP IP/OBS MODERATE 35: CPT | Performed by: STUDENT IN AN ORGANIZED HEALTH CARE EDUCATION/TRAINING PROGRAM

## 2022-01-01 PROCEDURE — 0241U HCHG SARS-COV-2 COVID-19 NFCT DS RESP RNA 4 TRGT MIC: CPT

## 2022-01-01 PROCEDURE — 96121 NUBHVL XM PHY/QHP EA ADDL HR: CPT | Performed by: CLINICAL NEUROPSYCHOLOGIST

## 2022-01-01 PROCEDURE — 700105 HCHG RX REV CODE 258: Performed by: ANESTHESIOLOGY

## 2022-01-01 PROCEDURE — 74183 MRI ABD W/O CNTR FLWD CNTR: CPT | Mod: ME

## 2022-01-01 PROCEDURE — RXMED WILLOW AMBULATORY MEDICATION CHARGE: Performed by: HOSPITALIST

## 2022-01-01 PROCEDURE — 82150 ASSAY OF AMYLASE: CPT

## 2022-01-01 PROCEDURE — 64447 NJX AA&/STRD FEMORAL NRV IMG: CPT | Performed by: STUDENT IN AN ORGANIZED HEALTH CARE EDUCATION/TRAINING PROGRAM

## 2022-01-01 PROCEDURE — 160048 HCHG OR STATISTICAL LEVEL 1-5: Performed by: STUDENT IN AN ORGANIZED HEALTH CARE EDUCATION/TRAINING PROGRAM

## 2022-01-01 PROCEDURE — 92526 ORAL FUNCTION THERAPY: CPT

## 2022-01-01 PROCEDURE — 86334 IMMUNOFIX E-PHORESIS SERUM: CPT

## 2022-01-01 PROCEDURE — 72170 X-RAY EXAM OF PELVIS: CPT

## 2022-01-01 PROCEDURE — 700117 HCHG RX CONTRAST REV CODE 255: Performed by: EMERGENCY MEDICINE

## 2022-01-01 PROCEDURE — 99232 SBSQ HOSP IP/OBS MODERATE 35: CPT | Performed by: NURSE PRACTITIONER

## 2022-01-01 PROCEDURE — RXMED WILLOW AMBULATORY MEDICATION CHARGE: Performed by: STUDENT IN AN ORGANIZED HEALTH CARE EDUCATION/TRAINING PROGRAM

## 2022-01-01 PROCEDURE — 160036 HCHG PACU - EA ADDL 30 MINS PHASE I: Performed by: STUDENT IN AN ORGANIZED HEALTH CARE EDUCATION/TRAINING PROGRAM

## 2022-01-01 PROCEDURE — 30233R1 TRANSFUSION OF NONAUTOLOGOUS PLATELETS INTO PERIPHERAL VEIN, PERCUTANEOUS APPROACH: ICD-10-PCS | Performed by: SURGERY

## 2022-01-01 PROCEDURE — G0180 MD CERTIFICATION HHA PATIENT: HCPCS | Performed by: FAMILY MEDICINE

## 2022-01-01 PROCEDURE — 72192 CT PELVIS W/O DYE: CPT | Mod: MG

## 2022-01-01 PROCEDURE — C9803 HOPD COVID-19 SPEC COLLECT: HCPCS | Performed by: EMERGENCY MEDICINE

## 2022-01-01 PROCEDURE — 84425 ASSAY OF VITAMIN B-1: CPT

## 2022-01-01 PROCEDURE — 160202 HCHG ENDO MINUTES - 1ST 30 MINS LEVEL 3: Performed by: INTERNAL MEDICINE

## 2022-01-01 PROCEDURE — 0BH17EZ INSERTION OF ENDOTRACHEAL AIRWAY INTO TRACHEA, VIA NATURAL OR ARTIFICIAL OPENING: ICD-10-PCS | Performed by: EMERGENCY MEDICINE

## 2022-01-01 PROCEDURE — 160031 HCHG SURGERY MINUTES - 1ST 30 MINS LEVEL 5: Performed by: STUDENT IN AN ORGANIZED HEALTH CARE EDUCATION/TRAINING PROGRAM

## 2022-01-01 PROCEDURE — 85730 THROMBOPLASTIN TIME PARTIAL: CPT

## 2022-01-01 PROCEDURE — 80179 DRUG ASSAY SALICYLATE: CPT

## 2022-01-01 PROCEDURE — 85576 BLOOD PLATELET AGGREGATION: CPT | Mod: 91

## 2022-01-01 PROCEDURE — 97530 THERAPEUTIC ACTIVITIES: CPT | Mod: CQ

## 2022-01-01 PROCEDURE — 86900 BLOOD TYPING SEROLOGIC ABO: CPT

## 2022-01-01 PROCEDURE — 99219 PR INITIAL OBSERVATION CARE,LEVL II: CPT | Performed by: STUDENT IN AN ORGANIZED HEALTH CARE EDUCATION/TRAINING PROGRAM

## 2022-01-01 PROCEDURE — 85610 PROTHROMBIN TIME: CPT

## 2022-01-01 PROCEDURE — 700101 HCHG RX REV CODE 250: Performed by: STUDENT IN AN ORGANIZED HEALTH CARE EDUCATION/TRAINING PROGRAM

## 2022-01-01 PROCEDURE — 700111 HCHG RX REV CODE 636 W/ 250 OVERRIDE (IP): Performed by: ANESTHESIOLOGY

## 2022-01-01 PROCEDURE — 86923 COMPATIBILITY TEST ELECTRIC: CPT | Mod: 91

## 2022-01-01 PROCEDURE — 82607 VITAMIN B-12: CPT

## 2022-01-01 PROCEDURE — 99232 SBSQ HOSP IP/OBS MODERATE 35: CPT | Performed by: HOSPITALIST

## 2022-01-01 PROCEDURE — 87040 BLOOD CULTURE FOR BACTERIA: CPT

## 2022-01-01 PROCEDURE — 96116 NUBHVL XM PHYS/QHP 1ST HR: CPT | Performed by: CLINICAL NEUROPSYCHOLOGIST

## 2022-01-01 PROCEDURE — 99215 OFFICE O/P EST HI 40 MIN: CPT | Performed by: INTERNAL MEDICINE

## 2022-01-01 PROCEDURE — 99233 SBSQ HOSP IP/OBS HIGH 50: CPT | Performed by: HOSPITALIST

## 2022-01-01 PROCEDURE — 99223 1ST HOSP IP/OBS HIGH 75: CPT | Mod: GC | Performed by: STUDENT IN AN ORGANIZED HEALTH CARE EDUCATION/TRAINING PROGRAM

## 2022-01-01 PROCEDURE — 304999 HCHG REPAIR-SIMPLE/INTERMED LEVEL 1

## 2022-01-01 PROCEDURE — C1776 JOINT DEVICE (IMPLANTABLE): HCPCS | Performed by: STUDENT IN AN ORGANIZED HEALTH CARE EDUCATION/TRAINING PROGRAM

## 2022-01-01 PROCEDURE — 85014 HEMATOCRIT: CPT

## 2022-01-01 PROCEDURE — 84439 ASSAY OF FREE THYROXINE: CPT

## 2022-01-01 PROCEDURE — 84155 ASSAY OF PROTEIN SERUM: CPT

## 2022-01-01 PROCEDURE — 85007 BL SMEAR W/DIFF WBC COUNT: CPT

## 2022-01-01 PROCEDURE — 99231 SBSQ HOSP IP/OBS SF/LOW 25: CPT | Performed by: SURGERY

## 2022-01-01 PROCEDURE — 0SRR0JA REPLACEMENT OF RIGHT HIP JOINT, FEMORAL SURFACE WITH SYNTHETIC SUBSTITUTE, UNCEMENTED, OPEN APPROACH: ICD-10-PCS | Performed by: STUDENT IN AN ORGANIZED HEALTH CARE EDUCATION/TRAINING PROGRAM

## 2022-01-01 PROCEDURE — 99215 OFFICE O/P EST HI 40 MIN: CPT | Performed by: PSYCHIATRY & NEUROLOGY

## 2022-01-01 PROCEDURE — 86039 ANTINUCLEAR ANTIBODIES (ANA): CPT

## 2022-01-01 PROCEDURE — 502000 HCHG MISC OR IMPLANTS RC 0278: Performed by: STUDENT IN AN ORGANIZED HEALTH CARE EDUCATION/TRAINING PROGRAM

## 2022-01-01 PROCEDURE — 99239 HOSP IP/OBS DSCHRG MGMT >30: CPT | Performed by: FAMILY MEDICINE

## 2022-01-01 PROCEDURE — 5A1935Z RESPIRATORY VENTILATION, LESS THAN 24 CONSECUTIVE HOURS: ICD-10-PCS | Performed by: EMERGENCY MEDICINE

## 2022-01-01 PROCEDURE — 700111 HCHG RX REV CODE 636 W/ 250 OVERRIDE (IP)

## 2022-01-01 PROCEDURE — 160208 HCHG ENDO MINUTES - EA ADDL 1 MIN LEVEL 4: Performed by: INTERNAL MEDICINE

## 2022-01-01 PROCEDURE — 302449 STATCHG TRIAGE ONLY (STATISTIC)

## 2022-01-01 PROCEDURE — 77080 DXA BONE DENSITY AXIAL: CPT

## 2022-01-01 PROCEDURE — 160203 HCHG ENDO MINUTES - 1ST 30 MINS LEVEL 4: Performed by: INTERNAL MEDICINE

## 2022-01-01 PROCEDURE — 87040 BLOOD CULTURE FOR BACTERIA: CPT | Mod: 91

## 2022-01-01 PROCEDURE — 700117 HCHG RX CONTRAST REV CODE 255: Performed by: INTERNAL MEDICINE

## 2022-01-01 PROCEDURE — 84165 PROTEIN E-PHORESIS SERUM: CPT

## 2022-01-01 PROCEDURE — 51702 INSERT TEMP BLADDER CATH: CPT

## 2022-01-01 PROCEDURE — 74018 RADEX ABDOMEN 1 VIEW: CPT

## 2022-01-01 PROCEDURE — 85018 HEMOGLOBIN: CPT

## 2022-01-01 PROCEDURE — 72128 CT CHEST SPINE W/O DYE: CPT

## 2022-01-01 PROCEDURE — 700101 HCHG RX REV CODE 250: Performed by: ANESTHESIOLOGY

## 2022-01-01 PROCEDURE — 99232 SBSQ HOSP IP/OBS MODERATE 35: CPT | Performed by: SURGERY

## 2022-01-01 PROCEDURE — 90471 IMMUNIZATION ADMIN: CPT

## 2022-01-01 PROCEDURE — 82232 ASSAY OF BETA-2 PROTEIN: CPT

## 2022-01-01 PROCEDURE — 31500 INSERT EMERGENCY AIRWAY: CPT

## 2022-01-01 PROCEDURE — 3E0T3BZ INTRODUCTION OF ANESTHETIC AGENT INTO PERIPHERAL NERVES AND PLEXI, PERCUTANEOUS APPROACH: ICD-10-PCS | Performed by: ANESTHESIOLOGY

## 2022-01-01 PROCEDURE — 90715 TDAP VACCINE 7 YRS/> IM: CPT

## 2022-01-01 PROCEDURE — 665001 SOC-HOME HEALTH

## 2022-01-01 PROCEDURE — 27236 TREAT THIGH FRACTURE: CPT | Mod: RT | Performed by: STUDENT IN AN ORGANIZED HEALTH CARE EDUCATION/TRAINING PROGRAM

## 2022-01-01 PROCEDURE — 99233 SBSQ HOSP IP/OBS HIGH 50: CPT | Performed by: NURSE PRACTITIONER

## 2022-01-01 PROCEDURE — 83605 ASSAY OF LACTIC ACID: CPT | Mod: 91

## 2022-01-01 PROCEDURE — 64447 NJX AA&/STRD FEMORAL NRV IMG: CPT | Mod: 59,RT | Performed by: ANESTHESIOLOGY

## 2022-01-01 PROCEDURE — 97167 OT EVAL HIGH COMPLEX 60 MIN: CPT

## 2022-01-01 PROCEDURE — 93306 TTE W/DOPPLER COMPLETE: CPT | Mod: 26 | Performed by: STUDENT IN AN ORGANIZED HEALTH CARE EDUCATION/TRAINING PROGRAM

## 2022-01-01 PROCEDURE — 72125 CT NECK SPINE W/O DYE: CPT

## 2022-01-01 PROCEDURE — 83970 ASSAY OF PARATHORMONE: CPT

## 2022-01-01 PROCEDURE — 160025 RECOVERY II MINUTES (STATS): Performed by: INTERNAL MEDICINE

## 2022-01-01 PROCEDURE — 80069 RENAL FUNCTION PANEL: CPT

## 2022-01-01 PROCEDURE — 80307 DRUG TEST PRSMV CHEM ANLYZR: CPT

## 2022-01-01 PROCEDURE — 93010 ELECTROCARDIOGRAM REPORT: CPT | Performed by: INTERNAL MEDICINE

## 2022-01-01 PROCEDURE — 23600 CLTX PROX HUMRL FX W/O MNPJ: CPT | Mod: RT | Performed by: ORTHOPAEDIC SURGERY

## 2022-01-01 PROCEDURE — 160042 HCHG SURGERY MINUTES - EA ADDL 1 MIN LEVEL 5: Performed by: STUDENT IN AN ORGANIZED HEALTH CARE EDUCATION/TRAINING PROGRAM

## 2022-01-01 PROCEDURE — 99214 OFFICE O/P EST MOD 30 MIN: CPT | Performed by: NURSE PRACTITIONER

## 2022-01-01 PROCEDURE — 73130 X-RAY EXAM OF HAND: CPT | Mod: TC,FY,LT | Performed by: RADIOLOGY

## 2022-01-01 PROCEDURE — 00813 ANES UPR LWR GI NDSC PX: CPT | Performed by: INTERNAL MEDICINE

## 2022-01-01 PROCEDURE — A9576 INJ PROHANCE MULTIPACK: HCPCS | Performed by: INTERNAL MEDICINE

## 2022-01-01 PROCEDURE — 99232 SBSQ HOSP IP/OBS MODERATE 35: CPT | Mod: 57 | Performed by: ORTHOPAEDIC SURGERY

## 2022-01-01 PROCEDURE — 73560 X-RAY EXAM OF KNEE 1 OR 2: CPT | Mod: RT

## 2022-01-01 PROCEDURE — 160046 HCHG PACU - 1ST 60 MINS PHASE II: Performed by: INTERNAL MEDICINE

## 2022-01-01 PROCEDURE — 96132 NRPSYC TST EVAL PHYS/QHP 1ST: CPT | Performed by: CLINICAL NEUROPSYCHOLOGIST

## 2022-01-01 PROCEDURE — 73552 X-RAY EXAM OF FEMUR 2/>: CPT | Mod: RT

## 2022-01-01 PROCEDURE — 83521 IG LIGHT CHAINS FREE EACH: CPT

## 2022-01-01 PROCEDURE — 96137 PSYCL/NRPSYC TST PHY/QHP EA: CPT | Performed by: CLINICAL NEUROPSYCHOLOGIST

## 2022-01-01 PROCEDURE — 29125 APPL SHORT ARM SPLINT STATIC: CPT

## 2022-01-01 DEVICE — IMPLANTABLE DEVICE: Type: IMPLANTABLE DEVICE | Site: HIP | Status: FUNCTIONAL

## 2022-01-01 RX ORDER — TACROLIMUS 1 MG/1
2 CAPSULE ORAL 2 TIMES DAILY
Status: DISCONTINUED | OUTPATIENT
Start: 2022-01-01 | End: 2022-01-01 | Stop reason: HOSPADM

## 2022-01-01 RX ORDER — LACOSAMIDE 50 MG/1
100 TABLET ORAL 2 TIMES DAILY
Status: DISCONTINUED | OUTPATIENT
Start: 2022-01-01 | End: 2022-01-01 | Stop reason: HOSPADM

## 2022-01-01 RX ORDER — LACOSAMIDE 50 MG/1
100 TABLET ORAL 2 TIMES DAILY
Status: DISCONTINUED | OUTPATIENT
Start: 2022-01-01 | End: 2022-01-01

## 2022-01-01 RX ORDER — LORAZEPAM 0.5 MG/1
0.5 TABLET ORAL EVERY 6 HOURS PRN
Status: DISCONTINUED | OUTPATIENT
Start: 2022-01-01 | End: 2022-01-01

## 2022-01-01 RX ORDER — ENOXAPARIN SODIUM 100 MG/ML
40 INJECTION SUBCUTANEOUS DAILY
Status: DISCONTINUED | OUTPATIENT
Start: 2022-01-01 | End: 2022-01-01

## 2022-01-01 RX ORDER — ONDANSETRON 2 MG/ML
4 INJECTION INTRAMUSCULAR; INTRAVENOUS EVERY 4 HOURS PRN
Status: DISCONTINUED | OUTPATIENT
Start: 2022-01-01 | End: 2022-01-01 | Stop reason: HOSPADM

## 2022-01-01 RX ORDER — ONDANSETRON 2 MG/ML
4 INJECTION INTRAMUSCULAR; INTRAVENOUS ONCE
Status: COMPLETED | OUTPATIENT
Start: 2022-01-01 | End: 2022-01-01

## 2022-01-01 RX ORDER — PRAVASTATIN SODIUM 20 MG
20 TABLET ORAL EVERY EVENING
Status: DISCONTINUED | OUTPATIENT
Start: 2022-01-01 | End: 2022-01-01 | Stop reason: HOSPADM

## 2022-01-01 RX ORDER — MYCOPHENOLATE MOFETIL 250 MG/1
250 CAPSULE ORAL 2 TIMES DAILY
Status: DISCONTINUED | OUTPATIENT
Start: 2022-01-01 | End: 2022-01-01 | Stop reason: HOSPADM

## 2022-01-01 RX ORDER — SODIUM CHLORIDE, SODIUM LACTATE, POTASSIUM CHLORIDE, CALCIUM CHLORIDE 600; 310; 30; 20 MG/100ML; MG/100ML; MG/100ML; MG/100ML
INJECTION, SOLUTION INTRAVENOUS CONTINUOUS
Status: DISCONTINUED | OUTPATIENT
Start: 2022-01-01 | End: 2022-01-01 | Stop reason: HOSPADM

## 2022-01-01 RX ORDER — LEVETIRACETAM 500 MG/1
750 TABLET ORAL 2 TIMES DAILY
Status: DISCONTINUED | OUTPATIENT
Start: 2022-01-01 | End: 2022-01-01 | Stop reason: HOSPADM

## 2022-01-01 RX ORDER — LEVETIRACETAM 500 MG/1
750 TABLET ORAL 2 TIMES DAILY
Refills: 1 | Status: DISCONTINUED | OUTPATIENT
Start: 2022-01-01 | End: 2022-01-01

## 2022-01-01 RX ORDER — FLUDROCORTISONE ACETATE 0.1 MG/1
0.1 TABLET ORAL DAILY
Status: DISCONTINUED | OUTPATIENT
Start: 2022-01-01 | End: 2022-01-01 | Stop reason: HOSPADM

## 2022-01-01 RX ORDER — ALBUTEROL SULFATE 90 UG/1
2 AEROSOL, METERED RESPIRATORY (INHALATION) EVERY 4 HOURS PRN
Qty: 8.5 G | Refills: 3 | Status: SHIPPED | OUTPATIENT
Start: 2022-01-01 | End: 2022-01-01

## 2022-01-01 RX ORDER — SODIUM CHLORIDE 9 MG/ML
INJECTION, SOLUTION INTRAVENOUS CONTINUOUS
Status: DISCONTINUED | OUTPATIENT
Start: 2022-01-01 | End: 2022-01-01

## 2022-01-01 RX ORDER — OXYCODONE HYDROCHLORIDE 10 MG/1
10 TABLET ORAL
Qty: 60 TABLET | Refills: 0 | Status: SHIPPED | OUTPATIENT
Start: 2022-01-01 | End: 2022-01-01 | Stop reason: SDUPTHER

## 2022-01-01 RX ORDER — ONDANSETRON 4 MG/1
4 TABLET, ORALLY DISINTEGRATING ORAL EVERY 4 HOURS PRN
Status: DISCONTINUED | OUTPATIENT
Start: 2022-01-01 | End: 2022-01-01 | Stop reason: HOSPADM

## 2022-01-01 RX ORDER — LACOSAMIDE 100 MG/1
100 TABLET ORAL 2 TIMES DAILY
Qty: 60 TABLET | Refills: 5 | Status: SHIPPED | OUTPATIENT
Start: 2022-01-01 | End: 2023-03-25

## 2022-01-01 RX ORDER — OXYCODONE HYDROCHLORIDE 5 MG/1
2.5 TABLET ORAL
Status: DISCONTINUED | OUTPATIENT
Start: 2022-01-01 | End: 2022-01-01 | Stop reason: HOSPADM

## 2022-01-01 RX ORDER — HYDROMORPHONE HYDROCHLORIDE 1 MG/ML
1 INJECTION, SOLUTION INTRAMUSCULAR; INTRAVENOUS; SUBCUTANEOUS
Status: DISCONTINUED | OUTPATIENT
Start: 2022-01-01 | End: 2022-01-01 | Stop reason: HOSPADM

## 2022-01-01 RX ORDER — CLONAZEPAM 0.5 MG/1
0.5 TABLET ORAL 3 TIMES DAILY
Qty: 84 TABLET | Refills: 0 | Status: SHIPPED | OUTPATIENT
Start: 2022-01-01 | End: 2022-01-01

## 2022-01-01 RX ORDER — METOCLOPRAMIDE 10 MG/1
TABLET ORAL
Qty: 60 TABLET | Refills: 0 | Status: SHIPPED | OUTPATIENT
Start: 2022-01-01 | End: 2022-01-01

## 2022-01-01 RX ORDER — DEXTROSE MONOHYDRATE 25 G/50ML
25 INJECTION, SOLUTION INTRAVENOUS
Status: DISCONTINUED | OUTPATIENT
Start: 2022-01-01 | End: 2022-01-01 | Stop reason: HOSPADM

## 2022-01-01 RX ORDER — MAGNESIUM SULFATE HEPTAHYDRATE 40 MG/ML
4 INJECTION, SOLUTION INTRAVENOUS ONCE
Status: COMPLETED | OUTPATIENT
Start: 2022-01-01 | End: 2022-01-01

## 2022-01-01 RX ORDER — SODIUM CHLORIDE 9 MG/ML
INJECTION, SOLUTION INTRAVENOUS CONTINUOUS
Status: DISCONTINUED | OUTPATIENT
Start: 2022-01-01 | End: 2022-01-01 | Stop reason: HOSPADM

## 2022-01-01 RX ORDER — CEFDINIR 300 MG/1
300 CAPSULE ORAL 2 TIMES DAILY
Qty: 2 CAPSULE | Refills: 0 | Status: SHIPPED | OUTPATIENT
Start: 2022-01-01 | End: 2022-01-01

## 2022-01-01 RX ORDER — LEVETIRACETAM 500 MG/1
750 TABLET ORAL EVERY 12 HOURS
Status: DISCONTINUED | OUTPATIENT
Start: 2022-01-01 | End: 2022-01-01 | Stop reason: HOSPADM

## 2022-01-01 RX ORDER — HYDROMORPHONE HYDROCHLORIDE 1 MG/ML
0.4 INJECTION, SOLUTION INTRAMUSCULAR; INTRAVENOUS; SUBCUTANEOUS
Status: DISCONTINUED | OUTPATIENT
Start: 2022-01-01 | End: 2022-01-01 | Stop reason: HOSPADM

## 2022-01-01 RX ORDER — M-VIT,TX,IRON,MINS/CALC/FOLIC 27MG-0.4MG
1 TABLET ORAL DAILY
Status: ON HOLD | COMMUNITY
End: 2022-01-01

## 2022-01-01 RX ORDER — TACROLIMUS 1 MG/1
CAPSULE ORAL
Qty: 120 CAPSULE | Refills: 5 | Status: SHIPPED | OUTPATIENT
Start: 2022-01-01 | End: 2022-01-01

## 2022-01-01 RX ORDER — PRAVASTATIN SODIUM 20 MG
20 TABLET ORAL DAILY
Status: DISCONTINUED | OUTPATIENT
Start: 2022-01-01 | End: 2022-01-01 | Stop reason: HOSPADM

## 2022-01-01 RX ORDER — SERTRALINE HYDROCHLORIDE 100 MG/1
150 TABLET, FILM COATED ORAL DAILY
Qty: 135 TABLET | Refills: 3 | Status: SHIPPED | OUTPATIENT
Start: 2022-01-01 | End: 2022-01-01

## 2022-01-01 RX ORDER — OXYCODONE HYDROCHLORIDE 10 MG/1
10 TABLET ORAL EVERY 6 HOURS PRN
Status: DISCONTINUED | OUTPATIENT
Start: 2022-01-01 | End: 2022-01-01 | Stop reason: HOSPADM

## 2022-01-01 RX ORDER — HYDROMORPHONE HYDROCHLORIDE 1 MG/ML
0.5 INJECTION, SOLUTION INTRAMUSCULAR; INTRAVENOUS; SUBCUTANEOUS ONCE
Status: COMPLETED | OUTPATIENT
Start: 2022-01-01 | End: 2022-01-01

## 2022-01-01 RX ORDER — OXYCODONE HYDROCHLORIDE 10 MG/1
10 TABLET ORAL EVERY 6 HOURS PRN
Status: DISCONTINUED | OUTPATIENT
Start: 2022-01-01 | End: 2022-01-01

## 2022-01-01 RX ORDER — FUROSEMIDE 20 MG/1
20 TABLET ORAL DAILY
Status: DISCONTINUED | OUTPATIENT
Start: 2022-01-01 | End: 2022-01-01

## 2022-01-01 RX ORDER — LACOSAMIDE 100 MG/1
100 TABLET ORAL 2 TIMES DAILY
Status: DISCONTINUED | OUTPATIENT
Start: 2022-01-01 | End: 2022-01-01 | Stop reason: HOSPADM

## 2022-01-01 RX ORDER — ACETAMINOPHEN 325 MG/1
650 TABLET ORAL EVERY 6 HOURS PRN
Status: DISCONTINUED | OUTPATIENT
Start: 2022-01-01 | End: 2022-01-01 | Stop reason: HOSPADM

## 2022-01-01 RX ORDER — ONDANSETRON 2 MG/ML
4 INJECTION INTRAMUSCULAR; INTRAVENOUS EVERY 4 HOURS PRN
Status: DISCONTINUED | OUTPATIENT
Start: 2022-01-01 | End: 2022-01-01

## 2022-01-01 RX ORDER — HALOPERIDOL 5 MG/ML
1 INJECTION INTRAMUSCULAR
Status: DISCONTINUED | OUTPATIENT
Start: 2022-01-01 | End: 2022-01-01 | Stop reason: HOSPADM

## 2022-01-01 RX ORDER — METOCLOPRAMIDE 10 MG/1
10 TABLET ORAL
Status: DISCONTINUED | OUTPATIENT
Start: 2022-01-01 | End: 2022-01-01 | Stop reason: HOSPADM

## 2022-01-01 RX ORDER — AMOXICILLIN 250 MG
2 CAPSULE ORAL 2 TIMES DAILY PRN
Status: DISCONTINUED | OUTPATIENT
Start: 2022-01-01 | End: 2022-01-01 | Stop reason: HOSPADM

## 2022-01-01 RX ORDER — MORPHINE SULFATE 15 MG/1
15 TABLET, FILM COATED, EXTENDED RELEASE ORAL EVERY 12 HOURS
Qty: 60 TABLET | Refills: 0 | Status: SHIPPED | OUTPATIENT
Start: 2022-01-01 | End: 2022-11-03

## 2022-01-01 RX ORDER — OXYCODONE HYDROCHLORIDE 10 MG/1
10 TABLET ORAL ONCE
Status: COMPLETED | OUTPATIENT
Start: 2022-01-01 | End: 2022-01-01

## 2022-01-01 RX ORDER — MORPHINE SULFATE 4 MG/ML
4 INJECTION INTRAVENOUS ONCE
Status: COMPLETED | OUTPATIENT
Start: 2022-01-01 | End: 2022-01-01

## 2022-01-01 RX ORDER — ROCURONIUM BROMIDE 10 MG/ML
INJECTION, SOLUTION INTRAVENOUS PRN
Status: DISCONTINUED | OUTPATIENT
Start: 2022-01-01 | End: 2022-01-01 | Stop reason: SURG

## 2022-01-01 RX ORDER — MORPHINE SULFATE 15 MG/1
15 TABLET, FILM COATED, EXTENDED RELEASE ORAL EVERY 12 HOURS
Qty: 60 TABLET | Refills: 0 | Status: SHIPPED | OUTPATIENT
Start: 2022-01-01 | End: 2022-01-01 | Stop reason: SDUPTHER

## 2022-01-01 RX ORDER — M-VIT,TX,IRON,MINS/CALC/FOLIC 27MG-0.4MG
1 TABLET ORAL DAILY
Status: DISCONTINUED | OUTPATIENT
Start: 2022-01-01 | End: 2022-01-01 | Stop reason: HOSPADM

## 2022-01-01 RX ORDER — TADALAFIL 20 MG/1
20 TABLET ORAL DAILY
Status: DISCONTINUED | OUTPATIENT
Start: 2022-01-01 | End: 2022-01-01

## 2022-01-01 RX ORDER — LORAZEPAM 2 MG/ML
0.5 INJECTION INTRAMUSCULAR ONCE
Status: COMPLETED | OUTPATIENT
Start: 2022-01-01 | End: 2022-01-01

## 2022-01-01 RX ORDER — MORPHINE SULFATE 15 MG/1
15 TABLET, FILM COATED, EXTENDED RELEASE ORAL EVERY 12 HOURS
Qty: 4 TABLET | Refills: 0 | Status: SHIPPED | OUTPATIENT
Start: 2022-01-01 | End: 2022-01-01

## 2022-01-01 RX ORDER — TADALAFIL 20 MG/1
20 TABLET ORAL DAILY
Status: DISCONTINUED | OUTPATIENT
Start: 2022-01-01 | End: 2022-01-01 | Stop reason: HOSPADM

## 2022-01-01 RX ORDER — SERTRALINE HYDROCHLORIDE 100 MG/1
150 TABLET, FILM COATED ORAL DAILY
Qty: 135 TABLET | Refills: 3 | Status: SHIPPED | OUTPATIENT
Start: 2022-01-01 | End: 2022-01-01 | Stop reason: SDUPTHER

## 2022-01-01 RX ORDER — BISACODYL 10 MG
10 SUPPOSITORY, RECTAL RECTAL
Status: DISCONTINUED | OUTPATIENT
Start: 2022-01-01 | End: 2022-01-01 | Stop reason: HOSPADM

## 2022-01-01 RX ORDER — AMOXICILLIN 250 MG
2 CAPSULE ORAL 2 TIMES DAILY
Status: DISCONTINUED | OUTPATIENT
Start: 2022-01-01 | End: 2022-01-01 | Stop reason: HOSPADM

## 2022-01-01 RX ORDER — LEVETIRACETAM 500 MG/1
1500 TABLET ORAL 2 TIMES DAILY
Status: DISCONTINUED | OUTPATIENT
Start: 2022-01-01 | End: 2022-01-01 | Stop reason: HOSPADM

## 2022-01-01 RX ORDER — FLUDROCORTISONE ACETATE 0.1 MG/1
0.1 TABLET ORAL DAILY
COMMUNITY
End: 2022-01-01 | Stop reason: SDUPTHER

## 2022-01-01 RX ORDER — ONDANSETRON 4 MG/1
4 TABLET, ORALLY DISINTEGRATING ORAL ONCE
Status: COMPLETED | OUTPATIENT
Start: 2022-01-01 | End: 2022-01-01

## 2022-01-01 RX ORDER — LACOSAMIDE 100 MG/1
100 TABLET ORAL 2 TIMES DAILY
Status: ON HOLD | COMMUNITY
Start: 2022-01-01 | End: 2022-01-01

## 2022-01-01 RX ORDER — LEVETIRACETAM 750 MG/1
750 TABLET ORAL 2 TIMES DAILY
COMMUNITY
End: 2022-01-01

## 2022-01-01 RX ORDER — GALCANEZUMAB 120 MG/ML
1 INJECTION, SOLUTION SUBCUTANEOUS
Qty: 1 EACH | Refills: 5 | Status: SHIPPED | OUTPATIENT
Start: 2022-01-01 | End: 2022-01-01 | Stop reason: SDUPTHER

## 2022-01-01 RX ORDER — LEVOTHYROXINE SODIUM 137 UG/1
137 TABLET ORAL
Status: DISCONTINUED | OUTPATIENT
Start: 2022-01-01 | End: 2022-01-01 | Stop reason: HOSPADM

## 2022-01-01 RX ORDER — OMEPRAZOLE 40 MG/1
40 CAPSULE, DELAYED RELEASE ORAL DAILY
Status: DISCONTINUED | OUTPATIENT
Start: 2022-01-01 | End: 2022-01-01

## 2022-01-01 RX ORDER — SODIUM CHLORIDE, SODIUM GLUCONATE, SODIUM ACETATE, POTASSIUM CHLORIDE AND MAGNESIUM CHLORIDE 526; 502; 368; 37; 30 MG/100ML; MG/100ML; MG/100ML; MG/100ML; MG/100ML
INJECTION, SOLUTION INTRAVENOUS
Status: DISCONTINUED | OUTPATIENT
Start: 2022-01-01 | End: 2022-01-01 | Stop reason: SURG

## 2022-01-01 RX ORDER — MIRTAZAPINE 15 MG/1
7.5 TABLET, ORALLY DISINTEGRATING ORAL
Qty: 15 TABLET | Refills: 0 | Status: SHIPPED | OUTPATIENT
Start: 2022-01-01 | End: 2022-01-01

## 2022-01-01 RX ORDER — OMEPRAZOLE 20 MG/1
40 CAPSULE, DELAYED RELEASE ORAL DAILY
Status: DISCONTINUED | OUTPATIENT
Start: 2022-01-01 | End: 2022-01-01

## 2022-01-01 RX ORDER — LEVETIRACETAM 500 MG/1
500 TABLET ORAL EVERY 12 HOURS
Status: DISCONTINUED | OUTPATIENT
Start: 2022-01-01 | End: 2022-01-01

## 2022-01-01 RX ORDER — SODIUM CHLORIDE, SODIUM LACTATE, POTASSIUM CHLORIDE, CALCIUM CHLORIDE 600; 310; 30; 20 MG/100ML; MG/100ML; MG/100ML; MG/100ML
1000 INJECTION, SOLUTION INTRAVENOUS ONCE
Status: COMPLETED | OUTPATIENT
Start: 2022-01-01 | End: 2022-01-01

## 2022-01-01 RX ORDER — HYDROMORPHONE HYDROCHLORIDE 1 MG/ML
.5-1 INJECTION, SOLUTION INTRAMUSCULAR; INTRAVENOUS; SUBCUTANEOUS
Status: DISCONTINUED | OUTPATIENT
Start: 2022-01-01 | End: 2022-01-01

## 2022-01-01 RX ORDER — DEXTROSE MONOHYDRATE 25 G/50ML
25 INJECTION, SOLUTION INTRAVENOUS
Status: DISCONTINUED | OUTPATIENT
Start: 2022-01-01 | End: 2022-01-01

## 2022-01-01 RX ORDER — TACROLIMUS 1 MG/1
2 CAPSULE ORAL 2 TIMES DAILY
Status: DISCONTINUED | OUTPATIENT
Start: 2022-01-01 | End: 2022-01-01

## 2022-01-01 RX ORDER — CALCIUM CARBONATE 500 MG/1
500 TABLET, CHEWABLE ORAL 3 TIMES DAILY PRN
Status: DISCONTINUED | OUTPATIENT
Start: 2022-01-01 | End: 2022-01-01 | Stop reason: HOSPADM

## 2022-01-01 RX ORDER — ALBUTEROL SULFATE 2.5 MG/3ML
2.5 SOLUTION RESPIRATORY (INHALATION)
Status: DISCONTINUED | OUTPATIENT
Start: 2022-01-01 | End: 2022-01-01 | Stop reason: HOSPADM

## 2022-01-01 RX ORDER — POLYETHYLENE GLYCOL 3350 17 G/17G
17 POWDER, FOR SOLUTION ORAL 2 TIMES DAILY
Refills: 3 | Status: SHIPPED
Start: 2022-01-01 | End: 2022-01-01

## 2022-01-01 RX ORDER — OXYCODONE HYDROCHLORIDE 10 MG/1
10 TABLET ORAL 3 TIMES DAILY PRN
Qty: 6 TABLET | Refills: 0 | Status: SHIPPED | OUTPATIENT
Start: 2022-01-01 | End: 2022-01-01

## 2022-01-01 RX ORDER — SODIUM CHLORIDE, SODIUM LACTATE, POTASSIUM CHLORIDE, CALCIUM CHLORIDE 600; 310; 30; 20 MG/100ML; MG/100ML; MG/100ML; MG/100ML
INJECTION, SOLUTION INTRAVENOUS CONTINUOUS
Status: DISCONTINUED | OUTPATIENT
Start: 2022-01-01 | End: 2022-01-01

## 2022-01-01 RX ORDER — PROCHLORPERAZINE EDISYLATE 5 MG/ML
5-10 INJECTION INTRAMUSCULAR; INTRAVENOUS EVERY 4 HOURS PRN
Status: DISCONTINUED | OUTPATIENT
Start: 2022-01-01 | End: 2022-01-01 | Stop reason: HOSPADM

## 2022-01-01 RX ORDER — LEVETIRACETAM 500 MG/1
1500 TABLET ORAL EVERY 12 HOURS
Status: DISCONTINUED | OUTPATIENT
Start: 2022-01-01 | End: 2022-01-01 | Stop reason: HOSPADM

## 2022-01-01 RX ORDER — POTASSIUM CHLORIDE 20 MEQ/1
20 TABLET, EXTENDED RELEASE ORAL DAILY
Status: DISCONTINUED | OUTPATIENT
Start: 2022-01-01 | End: 2022-01-01

## 2022-01-01 RX ORDER — BISACODYL 10 MG
10 SUPPOSITORY, RECTAL RECTAL
Status: DISCONTINUED | OUTPATIENT
Start: 2022-01-01 | End: 2022-01-01

## 2022-01-01 RX ORDER — HYDROMORPHONE HYDROCHLORIDE 1 MG/ML
0.2 INJECTION, SOLUTION INTRAMUSCULAR; INTRAVENOUS; SUBCUTANEOUS
Status: DISCONTINUED | OUTPATIENT
Start: 2022-01-01 | End: 2022-01-01 | Stop reason: HOSPADM

## 2022-01-01 RX ORDER — PRAVASTATIN SODIUM 10 MG
20 TABLET ORAL DAILY
Status: DISCONTINUED | OUTPATIENT
Start: 2022-01-01 | End: 2022-01-01 | Stop reason: HOSPADM

## 2022-01-01 RX ORDER — DOCUSATE SODIUM 50 MG/5ML
100 LIQUID ORAL 2 TIMES DAILY
Status: DISCONTINUED | OUTPATIENT
Start: 2022-01-01 | End: 2022-01-01

## 2022-01-01 RX ORDER — POTASSIUM CHLORIDE 7.45 MG/ML
10 INJECTION INTRAVENOUS ONCE
Status: COMPLETED | OUTPATIENT
Start: 2022-01-01 | End: 2022-01-01

## 2022-01-01 RX ORDER — PROMETHAZINE HYDROCHLORIDE 12.5 MG/1
12.5-25 SUPPOSITORY RECTAL EVERY 4 HOURS PRN
Status: DISCONTINUED | OUTPATIENT
Start: 2022-01-01 | End: 2022-01-01 | Stop reason: HOSPADM

## 2022-01-01 RX ORDER — TACROLIMUS 1 MG/1
2 CAPSULE ORAL 2 TIMES DAILY
Qty: 60 CAPSULE | Refills: 3 | Status: SHIPPED
Start: 2022-01-01 | End: 2022-01-01 | Stop reason: SDUPTHER

## 2022-01-01 RX ORDER — TACROLIMUS 1 MG/1
4 CAPSULE ORAL DAILY
Status: DISCONTINUED | OUTPATIENT
Start: 2022-01-01 | End: 2022-01-01 | Stop reason: HOSPADM

## 2022-01-01 RX ORDER — MORPHINE SULFATE 4 MG/ML
2-4 INJECTION INTRAVENOUS
Status: DISPENSED | OUTPATIENT
Start: 2022-01-01 | End: 2022-01-01

## 2022-01-01 RX ORDER — CLONAZEPAM 0.5 MG/1
0.5 TABLET ORAL 3 TIMES DAILY
COMMUNITY
Start: 2022-01-01 | End: 2022-01-01 | Stop reason: SDUPTHER

## 2022-01-01 RX ORDER — POLYETHYLENE GLYCOL 3350 17 G/17G
1 POWDER, FOR SOLUTION ORAL 2 TIMES DAILY
Status: DISCONTINUED | OUTPATIENT
Start: 2022-01-01 | End: 2022-01-01 | Stop reason: HOSPADM

## 2022-01-01 RX ORDER — POTASSIUM CHLORIDE 20 MEQ/1
20 TABLET, EXTENDED RELEASE ORAL DAILY
Qty: 90 TABLET | Refills: 3 | Status: ON HOLD | OUTPATIENT
Start: 2022-01-01 | End: 2022-01-01 | Stop reason: SDUPTHER

## 2022-01-01 RX ORDER — LEVOTHYROXINE SODIUM 137 UG/1
137 TABLET ORAL
Qty: 90 TABLET | Refills: 3 | Status: ON HOLD | OUTPATIENT
Start: 2022-01-01 | End: 2022-01-01

## 2022-01-01 RX ORDER — DIPHENHYDRAMINE HCL 12.5MG/5ML
12.5 LIQUID (ML) ORAL EVERY 4 HOURS PRN
Status: DISCONTINUED | OUTPATIENT
Start: 2022-01-01 | End: 2022-01-01 | Stop reason: HOSPADM

## 2022-01-01 RX ORDER — HYDROMORPHONE HYDROCHLORIDE 1 MG/ML
0.5 INJECTION, SOLUTION INTRAMUSCULAR; INTRAVENOUS; SUBCUTANEOUS
Status: DISCONTINUED | OUTPATIENT
Start: 2022-01-01 | End: 2022-01-01 | Stop reason: HOSPADM

## 2022-01-01 RX ORDER — MIDAZOLAM HYDROCHLORIDE 1 MG/ML
INJECTION INTRAMUSCULAR; INTRAVENOUS PRN
Status: DISCONTINUED | OUTPATIENT
Start: 2022-01-01 | End: 2022-01-01 | Stop reason: SURG

## 2022-01-01 RX ORDER — OXYBUTYNIN CHLORIDE 5 MG/1
10 TABLET, EXTENDED RELEASE ORAL DAILY
Status: DISCONTINUED | OUTPATIENT
Start: 2022-01-01 | End: 2022-01-01 | Stop reason: HOSPADM

## 2022-01-01 RX ORDER — OXYCODONE HYDROCHLORIDE 5 MG/1
5 TABLET ORAL
Status: DISCONTINUED | OUTPATIENT
Start: 2022-01-01 | End: 2022-01-01 | Stop reason: HOSPADM

## 2022-01-01 RX ORDER — NALOXONE HYDROCHLORIDE 4 MG/.1ML
1 SPRAY NASAL
Qty: 2 EACH | Refills: 0 | Status: SHIPPED | OUTPATIENT
Start: 2022-01-01

## 2022-01-01 RX ORDER — LORAZEPAM 2 MG/ML
1 INJECTION INTRAMUSCULAR
Status: DISCONTINUED | OUTPATIENT
Start: 2022-01-01 | End: 2022-01-01

## 2022-01-01 RX ORDER — MORPHINE SULFATE 15 MG/1
15 TABLET, FILM COATED, EXTENDED RELEASE ORAL EVERY 12 HOURS
Status: DISCONTINUED | OUTPATIENT
Start: 2022-01-01 | End: 2022-01-01 | Stop reason: HOSPADM

## 2022-01-01 RX ORDER — AMITRIPTYLINE HYDROCHLORIDE 25 MG/1
25 TABLET, FILM COATED ORAL NIGHTLY
Status: DISCONTINUED | OUTPATIENT
Start: 2022-01-01 | End: 2022-01-01

## 2022-01-01 RX ORDER — CLONAZEPAM 0.5 MG/1
0.5 TABLET ORAL 3 TIMES DAILY PRN
Status: DISCONTINUED | OUTPATIENT
Start: 2022-01-01 | End: 2022-01-01 | Stop reason: HOSPADM

## 2022-01-01 RX ORDER — GABAPENTIN 100 MG/1
100 CAPSULE ORAL 2 TIMES DAILY
Status: DISCONTINUED | OUTPATIENT
Start: 2022-01-01 | End: 2022-01-01 | Stop reason: HOSPADM

## 2022-01-01 RX ORDER — HYDROMORPHONE HYDROCHLORIDE 1 MG/ML
0.1 INJECTION, SOLUTION INTRAMUSCULAR; INTRAVENOUS; SUBCUTANEOUS
Status: DISCONTINUED | OUTPATIENT
Start: 2022-01-01 | End: 2022-01-01 | Stop reason: HOSPADM

## 2022-01-01 RX ORDER — PSEUDOEPHEDRINE HCL 30 MG
100 TABLET ORAL 2 TIMES DAILY
Qty: 60 CAPSULE | Status: SHIPPED
Start: 2022-01-01 | End: 2022-01-01

## 2022-01-01 RX ORDER — LACOSAMIDE 100 MG/1
100 TABLET ORAL 2 TIMES DAILY
Qty: 60 TABLET | Refills: 2 | Status: SHIPPED | OUTPATIENT
Start: 2022-01-01 | End: 2022-01-01 | Stop reason: SDUPTHER

## 2022-01-01 RX ORDER — LEVETIRACETAM 750 MG/1
750 TABLET ORAL 2 TIMES DAILY
Status: DISCONTINUED | OUTPATIENT
Start: 2022-01-01 | End: 2022-01-01

## 2022-01-01 RX ORDER — OMEPRAZOLE 20 MG/1
40 CAPSULE, DELAYED RELEASE ORAL DAILY
Status: DISCONTINUED | OUTPATIENT
Start: 2022-01-01 | End: 2022-01-01 | Stop reason: HOSPADM

## 2022-01-01 RX ORDER — GALCANEZUMAB 120 MG/ML
1 INJECTION, SOLUTION SUBCUTANEOUS
Qty: 1 EACH | Refills: 5 | Status: SHIPPED | OUTPATIENT
Start: 2022-01-01 | End: 2022-01-01

## 2022-01-01 RX ORDER — HYDROMORPHONE HYDROCHLORIDE 1 MG/ML
0.25 INJECTION, SOLUTION INTRAMUSCULAR; INTRAVENOUS; SUBCUTANEOUS
Status: DISCONTINUED | OUTPATIENT
Start: 2022-01-01 | End: 2022-01-01

## 2022-01-01 RX ORDER — CLONAZEPAM 0.5 MG/1
0.5 TABLET ORAL 3 TIMES DAILY
Status: DISCONTINUED | OUTPATIENT
Start: 2022-01-01 | End: 2022-01-01 | Stop reason: HOSPADM

## 2022-01-01 RX ORDER — TACROLIMUS 1 MG/1
CAPSULE ORAL
Qty: 360 CAPSULE | Refills: 2 | Status: ON HOLD | OUTPATIENT
Start: 2022-01-01 | End: 2022-01-01

## 2022-01-01 RX ORDER — OXYCODONE HYDROCHLORIDE 5 MG/1
5 TABLET ORAL EVERY 4 HOURS PRN
Status: DISCONTINUED | OUTPATIENT
Start: 2022-01-01 | End: 2022-01-01

## 2022-01-01 RX ORDER — TRAZODONE HYDROCHLORIDE 50 MG/1
50 TABLET ORAL
Status: DISCONTINUED | OUTPATIENT
Start: 2022-01-01 | End: 2022-01-01 | Stop reason: HOSPADM

## 2022-01-01 RX ORDER — POLYETHYLENE GLYCOL 3350 17 G/17G
1 POWDER, FOR SOLUTION ORAL
Status: DISCONTINUED | OUTPATIENT
Start: 2022-01-01 | End: 2022-01-01

## 2022-01-01 RX ORDER — ONDANSETRON 4 MG/1
4 TABLET, ORALLY DISINTEGRATING ORAL EVERY 8 HOURS PRN
Qty: 30 TABLET | Refills: 0 | Status: SHIPPED | OUTPATIENT
Start: 2022-01-01 | End: 2022-01-01

## 2022-01-01 RX ORDER — LABETALOL HYDROCHLORIDE 5 MG/ML
10 INJECTION, SOLUTION INTRAVENOUS EVERY 4 HOURS PRN
Status: DISCONTINUED | OUTPATIENT
Start: 2022-01-01 | End: 2022-01-01 | Stop reason: HOSPADM

## 2022-01-01 RX ORDER — LANOLIN ALCOHOL/MO/W.PET/CERES
400 CREAM (GRAM) TOPICAL DAILY
Status: DISCONTINUED | OUTPATIENT
Start: 2022-01-01 | End: 2022-01-01 | Stop reason: HOSPADM

## 2022-01-01 RX ORDER — SODIUM CHLORIDE, SODIUM LACTATE, POTASSIUM CHLORIDE, CALCIUM CHLORIDE 600; 310; 30; 20 MG/100ML; MG/100ML; MG/100ML; MG/100ML
INJECTION, SOLUTION INTRAVENOUS
Status: DISCONTINUED | OUTPATIENT
Start: 2022-01-01 | End: 2022-01-01 | Stop reason: SURG

## 2022-01-01 RX ORDER — TRAZODONE HYDROCHLORIDE 50 MG/1
50 TABLET ORAL NIGHTLY
COMMUNITY
End: 2022-01-01

## 2022-01-01 RX ORDER — PROMETHAZINE HYDROCHLORIDE 25 MG/1
12.5-25 TABLET ORAL EVERY 4 HOURS PRN
Status: DISCONTINUED | OUTPATIENT
Start: 2022-01-01 | End: 2022-01-01 | Stop reason: HOSPADM

## 2022-01-01 RX ORDER — CLONAZEPAM 0.5 MG/1
0.5 TABLET ORAL 3 TIMES DAILY
Status: DISCONTINUED | OUTPATIENT
Start: 2022-01-01 | End: 2022-01-01

## 2022-01-01 RX ORDER — KETOROLAC TROMETHAMINE 30 MG/ML
15 INJECTION, SOLUTION INTRAMUSCULAR; INTRAVENOUS ONCE
Status: COMPLETED | OUTPATIENT
Start: 2022-01-01 | End: 2022-01-01

## 2022-01-01 RX ORDER — OXYCODONE HYDROCHLORIDE 10 MG/1
10 TABLET ORAL EVERY 4 HOURS PRN
Status: DISCONTINUED | OUTPATIENT
Start: 2022-01-01 | End: 2022-01-01 | Stop reason: HOSPADM

## 2022-01-01 RX ORDER — OXYCODONE HYDROCHLORIDE 5 MG/1
2.5 TABLET ORAL
Status: DISCONTINUED | OUTPATIENT
Start: 2022-01-01 | End: 2022-01-01

## 2022-01-01 RX ORDER — AMOXICILLIN 250 MG
1 CAPSULE ORAL
Status: DISCONTINUED | OUTPATIENT
Start: 2022-01-01 | End: 2022-01-01 | Stop reason: HOSPADM

## 2022-01-01 RX ORDER — SACCHAROMYCES BOULARDII 250 MG
250 CAPSULE ORAL
Qty: 90 CAPSULE | Refills: 3 | Status: SHIPPED | OUTPATIENT
Start: 2022-01-01 | End: 2022-01-01

## 2022-01-01 RX ORDER — HYDROMORPHONE HYDROCHLORIDE 2 MG/ML
2 INJECTION, SOLUTION INTRAMUSCULAR; INTRAVENOUS; SUBCUTANEOUS ONCE
Status: COMPLETED | OUTPATIENT
Start: 2022-01-01 | End: 2022-01-01

## 2022-01-01 RX ORDER — HYDRALAZINE HYDROCHLORIDE 20 MG/ML
5 INJECTION INTRAMUSCULAR; INTRAVENOUS
Status: DISCONTINUED | OUTPATIENT
Start: 2022-01-01 | End: 2022-01-01 | Stop reason: HOSPADM

## 2022-01-01 RX ORDER — AMOXICILLIN 250 MG
1 CAPSULE ORAL NIGHTLY
Qty: 30 TABLET | Refills: 0 | Status: ON HOLD
Start: 2022-01-01 | End: 2022-01-01

## 2022-01-01 RX ORDER — ATROPINE SULFATE 10 MG/ML
2 SOLUTION/ DROPS OPHTHALMIC EVERY 4 HOURS PRN
Status: DISCONTINUED | OUTPATIENT
Start: 2022-01-01 | End: 2022-01-01

## 2022-01-01 RX ORDER — ROCURONIUM BROMIDE 10 MG/ML
INJECTION, SOLUTION INTRAVENOUS
Status: COMPLETED | OUTPATIENT
Start: 2022-01-01 | End: 2022-01-01

## 2022-01-01 RX ORDER — METRONIDAZOLE 500 MG/1
500 TABLET ORAL EVERY 8 HOURS
Qty: 3 TABLET | Refills: 0 | Status: SHIPPED | OUTPATIENT
Start: 2022-01-01 | End: 2022-01-01

## 2022-01-01 RX ORDER — CHOLECALCIFEROL (VITAMIN D3) 125 MCG
5 CAPSULE ORAL ONCE
Status: DISPENSED | OUTPATIENT
Start: 2022-01-01 | End: 2022-01-01

## 2022-01-01 RX ORDER — ONDANSETRON 2 MG/ML
4 INJECTION INTRAMUSCULAR; INTRAVENOUS
Status: DISCONTINUED | OUTPATIENT
Start: 2022-01-01 | End: 2022-01-01 | Stop reason: HOSPADM

## 2022-01-01 RX ORDER — ACETAMINOPHEN 500 MG
1000 TABLET ORAL 3 TIMES DAILY
Status: DISCONTINUED | OUTPATIENT
Start: 2022-01-01 | End: 2022-01-01 | Stop reason: HOSPADM

## 2022-01-01 RX ORDER — OXYCODONE HYDROCHLORIDE 10 MG/1
10 TABLET ORAL 3 TIMES DAILY
Qty: 90 TABLET | Refills: 0 | Status: SHIPPED | OUTPATIENT
Start: 2022-01-01 | End: 2022-11-03

## 2022-01-01 RX ORDER — AMOXICILLIN 250 MG
1 CAPSULE ORAL NIGHTLY
Status: DISCONTINUED | OUTPATIENT
Start: 2022-01-01 | End: 2022-01-01 | Stop reason: HOSPADM

## 2022-01-01 RX ORDER — LEVETIRACETAM 750 MG/1
1500 TABLET ORAL EVERY 12 HOURS
Qty: 120 TABLET | Refills: 0 | Status: SHIPPED | OUTPATIENT
Start: 2022-01-01 | End: 2022-10-31

## 2022-01-01 RX ORDER — METOCLOPRAMIDE 10 MG/1
10 TABLET ORAL 2 TIMES DAILY
Status: DISCONTINUED | OUTPATIENT
Start: 2022-01-01 | End: 2022-01-01 | Stop reason: HOSPADM

## 2022-01-01 RX ORDER — METOCLOPRAMIDE 10 MG/1
10 TABLET ORAL 2 TIMES DAILY
Status: DISCONTINUED | OUTPATIENT
Start: 2022-01-01 | End: 2022-01-01

## 2022-01-01 RX ORDER — AMITRIPTYLINE HYDROCHLORIDE 25 MG/1
25 TABLET, FILM COATED ORAL NIGHTLY
COMMUNITY
End: 2022-01-01

## 2022-01-01 RX ORDER — DIPHENHYDRAMINE HYDROCHLORIDE 50 MG/ML
12.5 INJECTION INTRAMUSCULAR; INTRAVENOUS
Status: DISCONTINUED | OUTPATIENT
Start: 2022-01-01 | End: 2022-01-01 | Stop reason: HOSPADM

## 2022-01-01 RX ORDER — POTASSIUM CHLORIDE 20 MEQ/1
20 TABLET, EXTENDED RELEASE ORAL DAILY
COMMUNITY

## 2022-01-01 RX ORDER — ENEMA 19; 7 G/133ML; G/133ML
1 ENEMA RECTAL
Status: DISCONTINUED | OUTPATIENT
Start: 2022-01-01 | End: 2022-01-01 | Stop reason: HOSPADM

## 2022-01-01 RX ORDER — CHOLECALCIFEROL (VITAMIN D3) 125 MCG
5 CAPSULE ORAL NIGHTLY PRN
Status: DISCONTINUED | OUTPATIENT
Start: 2022-01-01 | End: 2022-01-01 | Stop reason: HOSPADM

## 2022-01-01 RX ORDER — AMBRISENTAN 10 MG/1
10 TABLET, FILM COATED ORAL DAILY
Status: DISCONTINUED | OUTPATIENT
Start: 2022-01-01 | End: 2022-01-01

## 2022-01-01 RX ORDER — ONDANSETRON 4 MG/1
8 TABLET, ORALLY DISINTEGRATING ORAL EVERY 8 HOURS PRN
Qty: 90 TABLET | Refills: 0 | Status: SHIPPED | OUTPATIENT
Start: 2022-01-01 | End: 2022-01-01

## 2022-01-01 RX ORDER — AMITRIPTYLINE HYDROCHLORIDE 25 MG/1
25 TABLET, FILM COATED ORAL EVERY EVENING
Status: DISCONTINUED | OUTPATIENT
Start: 2022-01-01 | End: 2022-01-01 | Stop reason: HOSPADM

## 2022-01-01 RX ORDER — PROMETHAZINE HYDROCHLORIDE 25 MG/1
12.5-25 SUPPOSITORY RECTAL EVERY 4 HOURS PRN
Status: DISCONTINUED | OUTPATIENT
Start: 2022-01-01 | End: 2022-01-01 | Stop reason: HOSPADM

## 2022-01-01 RX ORDER — VANCOMYCIN HYDROCHLORIDE 500 MG/10ML
INJECTION, POWDER, LYOPHILIZED, FOR SOLUTION INTRAVENOUS
Status: COMPLETED | OUTPATIENT
Start: 2022-01-01 | End: 2022-01-01

## 2022-01-01 RX ORDER — CEFAZOLIN SODIUM 1 G/3ML
INJECTION, POWDER, FOR SOLUTION INTRAMUSCULAR; INTRAVENOUS PRN
Status: DISCONTINUED | OUTPATIENT
Start: 2022-01-01 | End: 2022-01-01 | Stop reason: SURG

## 2022-01-01 RX ORDER — OXYCODONE HYDROCHLORIDE 5 MG/1
5 TABLET ORAL EVERY 4 HOURS PRN
Status: DISCONTINUED | OUTPATIENT
Start: 2022-01-01 | End: 2022-01-01 | Stop reason: HOSPADM

## 2022-01-01 RX ORDER — LIDOCAINE HYDROCHLORIDE 20 MG/ML
INJECTION, SOLUTION EPIDURAL; INFILTRATION; INTRACAUDAL; PERINEURAL PRN
Status: DISCONTINUED | OUTPATIENT
Start: 2022-01-01 | End: 2022-01-01 | Stop reason: SURG

## 2022-01-01 RX ORDER — POTASSIUM CHLORIDE 20 MEQ/1
10 TABLET, EXTENDED RELEASE ORAL DAILY
Status: DISCONTINUED | OUTPATIENT
Start: 2022-01-01 | End: 2022-01-01 | Stop reason: HOSPADM

## 2022-01-01 RX ORDER — MEPERIDINE HYDROCHLORIDE 25 MG/ML
12.5 INJECTION INTRAMUSCULAR; INTRAVENOUS; SUBCUTANEOUS
Status: DISCONTINUED | OUTPATIENT
Start: 2022-01-01 | End: 2022-01-01 | Stop reason: HOSPADM

## 2022-01-01 RX ORDER — ALBUTEROL SULFATE 90 UG/1
2 AEROSOL, METERED RESPIRATORY (INHALATION)
Status: DISCONTINUED | OUTPATIENT
Start: 2022-01-01 | End: 2022-01-01 | Stop reason: HOSPADM

## 2022-01-01 RX ORDER — HYDRALAZINE HYDROCHLORIDE 20 MG/ML
20 INJECTION INTRAMUSCULAR; INTRAVENOUS EVERY 6 HOURS PRN
Status: DISCONTINUED | OUTPATIENT
Start: 2022-01-01 | End: 2022-01-01 | Stop reason: HOSPADM

## 2022-01-01 RX ORDER — MORPHINE SULFATE 15 MG/1
15 TABLET ORAL ONCE
Status: COMPLETED | OUTPATIENT
Start: 2022-01-01 | End: 2022-01-01

## 2022-01-01 RX ORDER — AMOXICILLIN 250 MG
1 CAPSULE ORAL NIGHTLY
Status: DISCONTINUED | OUTPATIENT
Start: 2022-01-01 | End: 2022-01-01

## 2022-01-01 RX ORDER — OXYCODONE HYDROCHLORIDE 5 MG/1
5 TABLET ORAL
Status: DISCONTINUED | OUTPATIENT
Start: 2022-01-01 | End: 2022-01-01

## 2022-01-01 RX ORDER — OXYCODONE HYDROCHLORIDE 10 MG/1
10 TABLET ORAL 3 TIMES DAILY
Qty: 90 TABLET | Refills: 0 | OUTPATIENT
Start: 2022-01-01 | End: 2022-11-20

## 2022-01-01 RX ORDER — M-VIT,TX,IRON,MINS/CALC/FOLIC 27MG-0.4MG
1 TABLET ORAL EVERY EVENING
Status: DISCONTINUED | OUTPATIENT
Start: 2022-01-01 | End: 2022-01-01 | Stop reason: HOSPADM

## 2022-01-01 RX ORDER — LANOLIN ALCOHOL/MO/W.PET/CERES
400 CREAM (GRAM) TOPICAL DAILY
Status: DISCONTINUED | OUTPATIENT
Start: 2022-01-01 | End: 2022-01-01

## 2022-01-01 RX ORDER — LEVETIRACETAM 500 MG/5ML
750 INJECTION, SOLUTION, CONCENTRATE INTRAVENOUS EVERY 12 HOURS
Status: DISCONTINUED | OUTPATIENT
Start: 2022-01-01 | End: 2022-01-01

## 2022-01-01 RX ORDER — LACOSAMIDE 10 MG/ML
100 INJECTION, SOLUTION INTRAVENOUS 2 TIMES DAILY
Status: DISCONTINUED | OUTPATIENT
Start: 2022-01-01 | End: 2022-01-01

## 2022-01-01 RX ORDER — MORPHINE SULFATE 15 MG/1
15 TABLET, FILM COATED, EXTENDED RELEASE ORAL EVERY 12 HOURS
Qty: 60 TABLET | Refills: 0 | Status: ON HOLD | OUTPATIENT
Start: 2022-01-01 | End: 2022-01-01

## 2022-01-01 RX ORDER — 3% SODIUM CHLORIDE 3 G/100ML
500 INJECTION, SOLUTION INTRAVENOUS CONTINUOUS
Status: DISCONTINUED | OUTPATIENT
Start: 2022-01-01 | End: 2022-01-01

## 2022-01-01 RX ORDER — TRANEXAMIC ACID 100 MG/ML
INJECTION, SOLUTION INTRAVENOUS PRN
Status: DISCONTINUED | OUTPATIENT
Start: 2022-01-01 | End: 2022-01-01 | Stop reason: SURG

## 2022-01-01 RX ORDER — ONDANSETRON 4 MG/1
4 TABLET, ORALLY DISINTEGRATING ORAL EVERY 8 HOURS PRN
Qty: 30 TABLET | Refills: 0 | Status: SHIPPED | OUTPATIENT
Start: 2022-01-01 | End: 2022-01-01 | Stop reason: SDUPTHER

## 2022-01-01 RX ORDER — AMOXICILLIN 250 MG
2 CAPSULE ORAL 2 TIMES DAILY
Status: DISCONTINUED | OUTPATIENT
Start: 2022-01-01 | End: 2022-01-01

## 2022-01-01 RX ORDER — DOCUSATE SODIUM 100 MG/1
100 CAPSULE, LIQUID FILLED ORAL 2 TIMES DAILY
Status: DISCONTINUED | OUTPATIENT
Start: 2022-01-01 | End: 2022-01-01

## 2022-01-01 RX ORDER — ONDANSETRON 4 MG/1
4 TABLET, ORALLY DISINTEGRATING ORAL EVERY 8 HOURS PRN
Qty: 90 TABLET | Refills: 0 | Status: SHIPPED | OUTPATIENT
Start: 2022-01-01 | End: 2022-01-01 | Stop reason: SDUPTHER

## 2022-01-01 RX ORDER — QUETIAPINE FUMARATE 25 MG/1
25 TABLET, FILM COATED ORAL ONCE
Status: COMPLETED | OUTPATIENT
Start: 2022-01-01 | End: 2022-01-01

## 2022-01-01 RX ORDER — AMITRIPTYLINE HYDROCHLORIDE 25 MG/1
25 TABLET, FILM COATED ORAL EVERY EVENING
COMMUNITY
Start: 2022-01-01 | End: 2022-01-01

## 2022-01-01 RX ORDER — HYDROCODONE BITARTRATE AND ACETAMINOPHEN 5; 325 MG/1; MG/1
1 TABLET ORAL ONCE
Status: COMPLETED | OUTPATIENT
Start: 2022-01-01 | End: 2022-01-01

## 2022-01-01 RX ORDER — METRONIDAZOLE 500 MG/1
500 TABLET ORAL EVERY 8 HOURS
Status: DISCONTINUED | OUTPATIENT
Start: 2022-01-01 | End: 2022-01-01 | Stop reason: HOSPADM

## 2022-01-01 RX ORDER — FLUDROCORTISONE ACETATE 0.1 MG/1
TABLET ORAL
Qty: 60 TABLET | Refills: 0 | Status: SHIPPED | OUTPATIENT
Start: 2022-01-01 | End: 2022-01-01

## 2022-01-01 RX ORDER — AMBRISENTAN 10 MG/1
10 TABLET, FILM COATED ORAL DAILY
Status: DISCONTINUED | OUTPATIENT
Start: 2022-01-01 | End: 2022-01-01 | Stop reason: HOSPADM

## 2022-01-01 RX ORDER — ALPRAZOLAM 1 MG/1
1 TABLET ORAL 3 TIMES DAILY PRN
Qty: 84 TABLET | Refills: 3 | Status: SHIPPED | OUTPATIENT
Start: 2022-01-01 | End: 2022-01-01

## 2022-01-01 RX ORDER — DOCUSATE SODIUM 100 MG/1
100 CAPSULE, LIQUID FILLED ORAL 2 TIMES DAILY
Status: DISCONTINUED | OUTPATIENT
Start: 2022-01-01 | End: 2022-01-01 | Stop reason: HOSPADM

## 2022-01-01 RX ORDER — LEVETIRACETAM 500 MG/5ML
500 INJECTION, SOLUTION, CONCENTRATE INTRAVENOUS EVERY 12 HOURS
Status: DISCONTINUED | OUTPATIENT
Start: 2022-01-01 | End: 2022-01-01

## 2022-01-01 RX ORDER — OXYCODONE HYDROCHLORIDE 5 MG/1
5 TABLET ORAL EVERY 8 HOURS PRN
Qty: 9 TABLET | Refills: 0 | Status: SHIPPED | OUTPATIENT
Start: 2022-01-01 | End: 2022-01-01

## 2022-01-01 RX ORDER — ONDANSETRON 4 MG/1
TABLET, ORALLY DISINTEGRATING ORAL
Qty: 90 TABLET | Refills: 0 | Status: SHIPPED | OUTPATIENT
Start: 2022-01-01 | End: 2022-01-01

## 2022-01-01 RX ORDER — LACOSAMIDE 100 MG/1
100 TABLET ORAL 2 TIMES DAILY
Qty: 60 TABLET | Refills: 0 | Status: SHIPPED | OUTPATIENT
Start: 2022-01-01 | End: 2022-01-01

## 2022-01-01 RX ORDER — MORPHINE SULFATE 15 MG/1
15 TABLET, FILM COATED, EXTENDED RELEASE ORAL ONCE
Status: COMPLETED | OUTPATIENT
Start: 2022-01-01 | End: 2022-01-01

## 2022-01-01 RX ORDER — LEVETIRACETAM 750 MG/1
1500 TABLET ORAL 2 TIMES DAILY
Qty: 120 TABLET | Refills: 5 | Status: SHIPPED | OUTPATIENT
Start: 2022-01-01 | End: 2022-01-01

## 2022-01-01 RX ORDER — ONDANSETRON 4 MG/1
4 TABLET, ORALLY DISINTEGRATING ORAL EVERY 6 HOURS PRN
Qty: 10 TABLET | Refills: 0 | Status: SHIPPED | OUTPATIENT
Start: 2022-01-01 | End: 2022-01-01

## 2022-01-01 RX ORDER — METOCLOPRAMIDE 10 MG/1
10 TABLET ORAL 2 TIMES DAILY
COMMUNITY

## 2022-01-01 RX ORDER — HYDRALAZINE HYDROCHLORIDE 20 MG/ML
10 INJECTION INTRAMUSCULAR; INTRAVENOUS EVERY 4 HOURS PRN
Status: DISCONTINUED | OUTPATIENT
Start: 2022-01-01 | End: 2022-01-01 | Stop reason: HOSPADM

## 2022-01-01 RX ORDER — MORPHINE SULFATE 4 MG/ML
2 INJECTION INTRAVENOUS ONCE
Status: DISCONTINUED | OUTPATIENT
Start: 2022-01-01 | End: 2022-01-01

## 2022-01-01 RX ORDER — SERTRALINE HYDROCHLORIDE 100 MG/1
150 TABLET, FILM COATED ORAL DAILY
Qty: 90 TABLET | Refills: 3 | Status: SHIPPED | OUTPATIENT
Start: 2022-01-01 | End: 2022-01-01

## 2022-01-01 RX ORDER — AMOXICILLIN 250 MG
1 CAPSULE ORAL
Status: DISCONTINUED | OUTPATIENT
Start: 2022-01-01 | End: 2022-01-01

## 2022-01-01 RX ORDER — METOCLOPRAMIDE 10 MG/1
5 TABLET ORAL 2 TIMES DAILY
Status: DISCONTINUED | OUTPATIENT
Start: 2022-01-01 | End: 2022-01-01

## 2022-01-01 RX ORDER — SODIUM CHLORIDE, SODIUM LACTATE, POTASSIUM CHLORIDE, AND CALCIUM CHLORIDE .6; .31; .03; .02 G/100ML; G/100ML; G/100ML; G/100ML
30 INJECTION, SOLUTION INTRAVENOUS
Status: DISCONTINUED | OUTPATIENT
Start: 2022-01-01 | End: 2022-01-01

## 2022-01-01 RX ORDER — ALBUTEROL SULFATE 90 UG/1
2 AEROSOL, METERED RESPIRATORY (INHALATION) EVERY 4 HOURS PRN
Status: DISCONTINUED | OUTPATIENT
Start: 2022-01-01 | End: 2022-01-01

## 2022-01-01 RX ORDER — ONDANSETRON 4 MG/1
4 TABLET, ORALLY DISINTEGRATING ORAL EVERY 4 HOURS PRN
Status: DISCONTINUED | OUTPATIENT
Start: 2022-01-01 | End: 2022-01-01

## 2022-01-01 RX ORDER — HYDROMORPHONE HYDROCHLORIDE 1 MG/ML
0.25 INJECTION, SOLUTION INTRAMUSCULAR; INTRAVENOUS; SUBCUTANEOUS EVERY 6 HOURS PRN
Status: DISCONTINUED | OUTPATIENT
Start: 2022-01-01 | End: 2022-01-01

## 2022-01-01 RX ORDER — LEVETIRACETAM 750 MG/1
1500 TABLET ORAL 2 TIMES DAILY
Qty: 360 TABLET | Refills: 3 | Status: ON HOLD | OUTPATIENT
Start: 2022-01-01 | End: 2022-01-01

## 2022-01-01 RX ORDER — SODIUM CHLORIDE 9 MG/ML
INJECTION, SOLUTION INTRAVENOUS CONTINUOUS
Status: ACTIVE | OUTPATIENT
Start: 2022-01-01 | End: 2022-01-01

## 2022-01-01 RX ORDER — MORPHINE SULFATE 10 MG/ML
5 INJECTION, SOLUTION INTRAMUSCULAR; INTRAVENOUS ONCE
Status: COMPLETED | OUTPATIENT
Start: 2022-01-01 | End: 2022-01-01

## 2022-01-01 RX ORDER — ALBUTEROL SULFATE 90 UG/1
2 AEROSOL, METERED RESPIRATORY (INHALATION) EVERY 4 HOURS PRN
Status: DISCONTINUED | OUTPATIENT
Start: 2022-01-01 | End: 2022-01-01 | Stop reason: HOSPADM

## 2022-01-01 RX ORDER — LORAZEPAM 2 MG/ML
0.5 INJECTION INTRAMUSCULAR
Status: COMPLETED | OUTPATIENT
Start: 2022-01-01 | End: 2022-01-01

## 2022-01-01 RX ORDER — NALOXONE HYDROCHLORIDE 0.4 MG/ML
0.4 INJECTION, SOLUTION INTRAMUSCULAR; INTRAVENOUS; SUBCUTANEOUS ONCE
Status: COMPLETED | OUTPATIENT
Start: 2022-01-01 | End: 2022-01-01

## 2022-01-01 RX ORDER — ONDANSETRON 4 MG/1
4 TABLET, ORALLY DISINTEGRATING ORAL EVERY 8 HOURS PRN
Qty: 30 TABLET | Refills: 2 | Status: ON HOLD | OUTPATIENT
Start: 2022-01-01 | End: 2022-01-01 | Stop reason: SDUPTHER

## 2022-01-01 RX ORDER — FUROSEMIDE 20 MG/1
20 TABLET ORAL DAILY
Status: DISCONTINUED | OUTPATIENT
Start: 2022-01-01 | End: 2022-01-01 | Stop reason: HOSPADM

## 2022-01-01 RX ORDER — POLYETHYLENE GLYCOL 3350 17 G/17G
1 POWDER, FOR SOLUTION ORAL
Status: DISCONTINUED | OUTPATIENT
Start: 2022-01-01 | End: 2022-01-01 | Stop reason: HOSPADM

## 2022-01-01 RX ORDER — MORPHINE SULFATE 4 MG/ML
4 INJECTION INTRAVENOUS EVERY 6 HOURS PRN
Status: DISCONTINUED | OUTPATIENT
Start: 2022-01-01 | End: 2022-01-01

## 2022-01-01 RX ORDER — OXYCODONE HCL 5 MG/5 ML
5 SOLUTION, ORAL ORAL
Status: DISCONTINUED | OUTPATIENT
Start: 2022-01-01 | End: 2022-01-01 | Stop reason: HOSPADM

## 2022-01-01 RX ORDER — OXYMETAZOLINE HYDROCHLORIDE 0.05 G/100ML
2 SPRAY NASAL ONCE
Status: COMPLETED | OUTPATIENT
Start: 2022-01-01 | End: 2022-01-01

## 2022-01-01 RX ORDER — PRAVASTATIN SODIUM 20 MG
20 TABLET ORAL DAILY
Qty: 100 TABLET | Refills: 3 | Status: SHIPPED | OUTPATIENT
Start: 2022-01-01

## 2022-01-01 RX ORDER — GABAPENTIN 100 MG/1
100 CAPSULE ORAL 2 TIMES DAILY
Status: DISCONTINUED | OUTPATIENT
Start: 2022-01-01 | End: 2022-01-01

## 2022-01-01 RX ORDER — LIDOCAINE 50 MG/G
1 PATCH TOPICAL DAILY
Status: DISCONTINUED | OUTPATIENT
Start: 2022-01-01 | End: 2022-01-01 | Stop reason: HOSPADM

## 2022-01-01 RX ORDER — ASCORBIC ACID 500 MG
500 TABLET ORAL DAILY
Status: DISCONTINUED | OUTPATIENT
Start: 2022-01-01 | End: 2022-01-01

## 2022-01-01 RX ORDER — GABAPENTIN 100 MG/1
100 CAPSULE ORAL 3 TIMES DAILY
Status: DISCONTINUED | OUTPATIENT
Start: 2022-01-01 | End: 2022-01-01 | Stop reason: HOSPADM

## 2022-01-01 RX ORDER — SODIUM CHLORIDE, SODIUM LACTATE, POTASSIUM CHLORIDE, AND CALCIUM CHLORIDE .6; .31; .03; .02 G/100ML; G/100ML; G/100ML; G/100ML
30 INJECTION, SOLUTION INTRAVENOUS ONCE
Status: COMPLETED | OUTPATIENT
Start: 2022-01-01 | End: 2022-01-01

## 2022-01-01 RX ORDER — OXYCODONE HCL 5 MG/5 ML
10 SOLUTION, ORAL ORAL
Status: DISCONTINUED | OUTPATIENT
Start: 2022-01-01 | End: 2022-01-01 | Stop reason: HOSPADM

## 2022-01-01 RX ORDER — POLYETHYLENE GLYCOL 3350 17 G/17G
1 POWDER, FOR SOLUTION ORAL 2 TIMES DAILY
Status: DISCONTINUED | OUTPATIENT
Start: 2022-01-01 | End: 2022-01-01

## 2022-01-01 RX ORDER — SODIUM CHLORIDE 9 MG/ML
1000 INJECTION, SOLUTION INTRAVENOUS ONCE
Status: COMPLETED | OUTPATIENT
Start: 2022-01-01 | End: 2022-01-01

## 2022-01-01 RX ORDER — LEVETIRACETAM 500 MG/1
750 TABLET ORAL EVERY 12 HOURS
Status: DISCONTINUED | OUTPATIENT
Start: 2022-01-01 | End: 2022-01-01

## 2022-01-01 RX ORDER — BUPIVACAINE HYDROCHLORIDE AND EPINEPHRINE 2.5; 5 MG/ML; UG/ML
INJECTION, SOLUTION EPIDURAL; INFILTRATION; INTRACAUDAL; PERINEURAL PRN
Status: DISCONTINUED | OUTPATIENT
Start: 2022-01-01 | End: 2022-01-01 | Stop reason: SURG

## 2022-01-01 RX ORDER — ONDANSETRON 4 MG/1
4 TABLET, ORALLY DISINTEGRATING ORAL EVERY 8 HOURS PRN
Qty: 90 TABLET | Refills: 0 | Status: SHIPPED | OUTPATIENT
Start: 2022-01-01 | End: 2022-01-01

## 2022-01-01 RX ORDER — MORPHINE SULFATE 15 MG/1
15 TABLET, FILM COATED, EXTENDED RELEASE ORAL EVERY 12 HOURS
Qty: 60 TABLET | Refills: 0 | OUTPATIENT
Start: 2022-01-01 | End: 2022-11-20

## 2022-01-01 RX ORDER — MORPHINE SULFATE 15 MG/1
7.5 TABLET ORAL EVERY 4 HOURS PRN
Qty: 15 TABLET | Refills: 0 | Status: SHIPPED | OUTPATIENT
Start: 2022-01-01 | End: 2022-01-01

## 2022-01-01 RX ORDER — FLUDROCORTISONE ACETATE 0.1 MG/1
0.1 TABLET ORAL DAILY
Status: DISCONTINUED | OUTPATIENT
Start: 2022-01-01 | End: 2022-01-01

## 2022-01-01 RX ORDER — ACETAMINOPHEN 500 MG
1000 TABLET ORAL 3 TIMES DAILY
Status: DISCONTINUED | OUTPATIENT
Start: 2022-01-01 | End: 2022-01-01

## 2022-01-01 RX ORDER — CLONAZEPAM 0.5 MG/1
0.5 TABLET ORAL 3 TIMES DAILY
Qty: 90 TABLET | Refills: 3 | Status: SHIPPED | OUTPATIENT
Start: 2022-01-01 | End: 2022-01-01

## 2022-01-01 RX ORDER — FAMOTIDINE 20 MG/1
20 TABLET, FILM COATED ORAL 2 TIMES DAILY
Status: DISCONTINUED | OUTPATIENT
Start: 2022-01-01 | End: 2022-01-01

## 2022-01-01 RX ORDER — FLUDROCORTISONE ACETATE 0.1 MG/1
0.1 TABLET ORAL DAILY
COMMUNITY
End: 2022-01-01

## 2022-01-01 RX ORDER — HYDROMORPHONE HYDROCHLORIDE 1 MG/ML
1 INJECTION, SOLUTION INTRAMUSCULAR; INTRAVENOUS; SUBCUTANEOUS EVERY 6 HOURS PRN
Status: DISCONTINUED | OUTPATIENT
Start: 2022-01-01 | End: 2022-01-01 | Stop reason: HOSPADM

## 2022-01-01 RX ORDER — LORAZEPAM 2 MG/ML
4 INJECTION INTRAMUSCULAR
Status: DISCONTINUED | OUTPATIENT
Start: 2022-01-01 | End: 2022-01-01 | Stop reason: HOSPADM

## 2022-01-01 RX ORDER — LEVETIRACETAM 750 MG/1
TABLET ORAL
Qty: 120 TABLET | Refills: 3 | Status: SHIPPED | OUTPATIENT
Start: 2022-01-01 | End: 2022-01-01 | Stop reason: SDUPTHER

## 2022-01-01 RX ORDER — HYDROMORPHONE HYDROCHLORIDE 1 MG/ML
0.25 INJECTION, SOLUTION INTRAMUSCULAR; INTRAVENOUS; SUBCUTANEOUS EVERY 4 HOURS PRN
Status: DISCONTINUED | OUTPATIENT
Start: 2022-01-01 | End: 2022-01-01 | Stop reason: HOSPADM

## 2022-01-01 RX ORDER — TACROLIMUS 1 MG/1
2 CAPSULE ORAL 2 TIMES DAILY
Qty: 60 CAPSULE | Refills: 3 | Status: SHIPPED | OUTPATIENT
Start: 2022-01-01

## 2022-01-01 RX ORDER — LACOSAMIDE 100 MG/1
100 TABLET ORAL 2 TIMES DAILY
Qty: 60 TABLET | Refills: 5 | Status: SHIPPED | OUTPATIENT
Start: 2022-01-01 | End: 2022-01-01 | Stop reason: SDUPTHER

## 2022-01-01 RX ORDER — ACETAMINOPHEN 325 MG/1
650 TABLET ORAL EVERY 6 HOURS PRN
Status: DISCONTINUED | OUTPATIENT
Start: 2022-01-01 | End: 2022-01-01

## 2022-01-01 RX ORDER — POTASSIUM CHLORIDE 750 MG/1
10 TABLET, EXTENDED RELEASE ORAL DAILY
Status: SHIPPED
Start: 2022-01-01 | End: 2022-01-01

## 2022-01-01 RX ORDER — GALCANEZUMAB 120 MG/ML
2 INJECTION, SOLUTION SUBCUTANEOUS ONCE
Qty: 2 EACH | Refills: 0 | Status: SHIPPED | OUTPATIENT
Start: 2022-01-01 | End: 2022-01-01

## 2022-01-01 RX ORDER — PROMETHAZINE HYDROCHLORIDE 25 MG/1
25 SUPPOSITORY RECTAL EVERY 6 HOURS PRN
Qty: 10 SUPPOSITORY | Refills: 0 | Status: ON HOLD | OUTPATIENT
Start: 2022-01-01 | End: 2022-01-01

## 2022-01-01 RX ORDER — HYDROMORPHONE HYDROCHLORIDE 1 MG/ML
1 INJECTION, SOLUTION INTRAMUSCULAR; INTRAVENOUS; SUBCUTANEOUS EVERY 6 HOURS PRN
Status: DISCONTINUED | OUTPATIENT
Start: 2022-01-01 | End: 2022-01-01

## 2022-01-01 RX ORDER — TADALAFIL 20 MG/1
20 TABLET ORAL DAILY
Qty: 90 TABLET | Refills: 3 | Status: SHIPPED | OUTPATIENT
Start: 2022-01-01 | End: 2022-01-01 | Stop reason: SDUPTHER

## 2022-01-01 RX ORDER — LORAZEPAM 2 MG/ML
1 CONCENTRATE ORAL
Status: DISCONTINUED | OUTPATIENT
Start: 2022-01-01 | End: 2022-01-01

## 2022-01-01 RX ORDER — OMEPRAZOLE 40 MG/1
40 CAPSULE, DELAYED RELEASE ORAL DAILY
Qty: 90 CAPSULE | Refills: 3 | Status: SHIPPED | OUTPATIENT
Start: 2022-01-01 | End: 2022-01-01

## 2022-01-01 RX ORDER — 3% SODIUM CHLORIDE 3 G/100ML
500 INJECTION, SOLUTION INTRAVENOUS ONCE
Status: DISCONTINUED | OUTPATIENT
Start: 2022-01-01 | End: 2022-01-01

## 2022-01-01 RX ORDER — SERTRALINE HYDROCHLORIDE 100 MG/1
100 TABLET, FILM COATED ORAL DAILY
Status: DISCONTINUED | OUTPATIENT
Start: 2022-01-01 | End: 2022-01-01 | Stop reason: HOSPADM

## 2022-01-01 RX ORDER — MYCOPHENOLATE MOFETIL 250 MG/1
250 CAPSULE ORAL 2 TIMES DAILY
Status: DISCONTINUED | OUTPATIENT
Start: 2022-01-01 | End: 2022-01-01

## 2022-01-01 RX ORDER — ENEMA 19; 7 G/133ML; G/133ML
1 ENEMA RECTAL
Status: DISCONTINUED | OUTPATIENT
Start: 2022-01-01 | End: 2022-01-01

## 2022-01-01 RX ORDER — LACOSAMIDE 100 MG/1
100 TABLET ORAL 2 TIMES DAILY
Qty: 60 TABLET | Refills: 0 | Status: SHIPPED | OUTPATIENT
Start: 2022-01-01 | End: 2022-01-01 | Stop reason: SDUPTHER

## 2022-01-01 RX ORDER — CEFTRIAXONE 1 G/1
1 INJECTION, POWDER, FOR SOLUTION INTRAMUSCULAR; INTRAVENOUS ONCE
Status: COMPLETED | OUTPATIENT
Start: 2022-01-01 | End: 2022-01-01

## 2022-01-01 RX ORDER — MAGNESIUM SULFATE HEPTAHYDRATE 40 MG/ML
2 INJECTION, SOLUTION INTRAVENOUS ONCE
Status: COMPLETED | OUTPATIENT
Start: 2022-01-01 | End: 2022-01-01

## 2022-01-01 RX ORDER — ONDANSETRON 4 MG/1
TABLET, ORALLY DISINTEGRATING ORAL
Qty: 90 TABLET | Refills: 1 | Status: ON HOLD | OUTPATIENT
Start: 2022-01-01 | End: 2022-01-01

## 2022-01-01 RX ORDER — OXYCODONE HYDROCHLORIDE 5 MG/1
5 TABLET ORAL ONCE
Status: COMPLETED | OUTPATIENT
Start: 2022-01-01 | End: 2022-01-01

## 2022-01-01 RX ORDER — POTASSIUM CHLORIDE 7.45 MG/ML
10 INJECTION INTRAVENOUS
Status: DISPENSED | OUTPATIENT
Start: 2022-01-01 | End: 2022-01-01

## 2022-01-01 RX ORDER — MAGNESIUM OXIDE 400 MG/1
400 TABLET ORAL DAILY
Status: ON HOLD | COMMUNITY
End: 2022-01-01

## 2022-01-01 RX ORDER — FUROSEMIDE 10 MG/ML
20 INJECTION INTRAMUSCULAR; INTRAVENOUS ONCE
Status: DISCONTINUED | OUTPATIENT
Start: 2022-01-01 | End: 2022-01-01

## 2022-01-01 RX ORDER — LEVETIRACETAM 750 MG/1
TABLET ORAL
Qty: 360 TABLET | Refills: 1 | Status: SHIPPED | OUTPATIENT
Start: 2022-01-01 | End: 2022-01-01 | Stop reason: SDUPTHER

## 2022-01-01 RX ORDER — OXYCODONE HYDROCHLORIDE 10 MG/1
10 TABLET ORAL
Qty: 60 TABLET | Refills: 0 | Status: ON HOLD | OUTPATIENT
Start: 2022-01-01 | End: 2022-01-01

## 2022-01-01 RX ORDER — TRAMADOL HYDROCHLORIDE 50 MG/1
50 TABLET ORAL ONCE
Status: DISCONTINUED | OUTPATIENT
Start: 2022-01-01 | End: 2022-01-01

## 2022-01-01 RX ORDER — CLONAZEPAM 0.5 MG/1
TABLET ORAL 3 TIMES DAILY
COMMUNITY

## 2022-01-01 RX ORDER — SODIUM CHLORIDE, SODIUM LACTATE, POTASSIUM CHLORIDE, AND CALCIUM CHLORIDE .6; .31; .03; .02 G/100ML; G/100ML; G/100ML; G/100ML
500 INJECTION, SOLUTION INTRAVENOUS
Status: DISCONTINUED | OUTPATIENT
Start: 2022-01-01 | End: 2022-01-01 | Stop reason: HOSPADM

## 2022-01-01 RX ORDER — OXYBUTYNIN CHLORIDE 10 MG/1
10 TABLET, EXTENDED RELEASE ORAL DAILY
Qty: 30 TABLET | Refills: 0 | Status: SHIPPED | OUTPATIENT
Start: 2022-01-01 | End: 2022-01-01

## 2022-01-01 RX ORDER — OXYCODONE HYDROCHLORIDE 10 MG/1
10 TABLET ORAL
Status: DISCONTINUED | OUTPATIENT
Start: 2022-01-01 | End: 2022-01-01 | Stop reason: HOSPADM

## 2022-01-01 RX ORDER — LEVETIRACETAM 500 MG/5ML
1500 INJECTION, SOLUTION, CONCENTRATE INTRAVENOUS EVERY 12 HOURS
Status: DISCONTINUED | OUTPATIENT
Start: 2022-01-01 | End: 2022-01-01 | Stop reason: HOSPADM

## 2022-01-01 RX ORDER — SERTRALINE HYDROCHLORIDE 100 MG/1
150 TABLET, FILM COATED ORAL DAILY
Status: DISCONTINUED | OUTPATIENT
Start: 2022-01-01 | End: 2022-01-01

## 2022-01-01 RX ORDER — OXYCODONE HCL 10 MG/1
10 TABLET, FILM COATED, EXTENDED RELEASE ORAL EVERY 4 HOURS PRN
Status: DISCONTINUED | OUTPATIENT
Start: 2022-01-01 | End: 2022-01-01 | Stop reason: HOSPADM

## 2022-01-01 RX ORDER — OXYCODONE HYDROCHLORIDE AND ACETAMINOPHEN 5; 325 MG/1; MG/1
1 TABLET ORAL EVERY 4 HOURS PRN
Qty: 18 TABLET | Refills: 0 | Status: SHIPPED | OUTPATIENT
Start: 2022-01-01 | End: 2022-01-01

## 2022-01-01 RX ORDER — CLONAZEPAM 0.5 MG/1
0.5 TABLET ORAL 3 TIMES DAILY
Status: ON HOLD | COMMUNITY
End: 2022-01-01

## 2022-01-01 RX ORDER — MYCOPHENOLATE MOFETIL 250 MG/1
250 CAPSULE ORAL 2 TIMES DAILY
Qty: 20 CAPSULE | OUTPATIENT
Start: 2022-01-01

## 2022-01-01 RX ORDER — CLONAZEPAM 0.5 MG/1
0.5 TABLET ORAL 3 TIMES DAILY PRN
Qty: 6 TABLET | Refills: 0 | Status: SHIPPED | OUTPATIENT
Start: 2022-01-01 | End: 2022-01-01

## 2022-01-01 RX ORDER — PROMETHAZINE HYDROCHLORIDE 25 MG/1
12.5 SUPPOSITORY RECTAL EVERY 6 HOURS PRN
Status: DISCONTINUED | OUTPATIENT
Start: 2022-01-01 | End: 2022-01-01 | Stop reason: HOSPADM

## 2022-01-01 RX ORDER — FLUDROCORTISONE ACETATE 0.1 MG/1
0.1 TABLET ORAL DAILY
Qty: 90 TABLET | Refills: 2 | Status: SHIPPED | OUTPATIENT
Start: 2022-01-01 | End: 2022-01-01

## 2022-01-01 RX ORDER — MIRTAZAPINE 15 MG/1
7.5 TABLET, ORALLY DISINTEGRATING ORAL
Status: DISCONTINUED | OUTPATIENT
Start: 2022-01-01 | End: 2022-01-01 | Stop reason: HOSPADM

## 2022-01-01 RX ADMIN — ONDANSETRON 4 MG: 2 INJECTION INTRAMUSCULAR; INTRAVENOUS at 19:23

## 2022-01-01 RX ADMIN — SODIUM CHLORIDE, SODIUM GLUCONATE, SODIUM ACETATE, POTASSIUM CHLORIDE AND MAGNESIUM CHLORIDE: 526; 502; 368; 37; 30 INJECTION, SOLUTION INTRAVENOUS at 14:51

## 2022-01-01 RX ADMIN — OXYCODONE HYDROCHLORIDE AND ACETAMINOPHEN 500 MG: 500 TABLET ORAL at 04:43

## 2022-01-01 RX ADMIN — LACOSAMIDE 100 MG: 100 TABLET, FILM COATED ORAL at 06:59

## 2022-01-01 RX ADMIN — DOCUSATE SODIUM 50 MG AND SENNOSIDES 8.6 MG 2 TABLET: 8.6; 5 TABLET, FILM COATED ORAL at 06:05

## 2022-01-01 RX ADMIN — TACROLIMUS 2 MG: 5 CAPSULE ORAL at 22:35

## 2022-01-01 RX ADMIN — GABAPENTIN 100 MG: 100 CAPSULE ORAL at 21:25

## 2022-01-01 RX ADMIN — OXYCODONE HYDROCHLORIDE 15 MG: 10 TABLET ORAL at 04:30

## 2022-01-01 RX ADMIN — LORAZEPAM 1 MG: 2 INJECTION INTRAMUSCULAR; INTRAVENOUS at 03:26

## 2022-01-01 RX ADMIN — LEVETIRACETAM 750 MG: 500 TABLET, FILM COATED ORAL at 05:30

## 2022-01-01 RX ADMIN — PRAVASTATIN SODIUM 20 MG: 20 TABLET ORAL at 05:27

## 2022-01-01 RX ADMIN — POLYETHYLENE GLYCOL 3350 1 PACKET: 17 POWDER, FOR SOLUTION ORAL at 06:21

## 2022-01-01 RX ADMIN — OXYCODONE HYDROCHLORIDE 10 MG: 10 TABLET ORAL at 20:04

## 2022-01-01 RX ADMIN — POTASSIUM CHLORIDE 10 MEQ: 1500 TABLET, EXTENDED RELEASE ORAL at 05:32

## 2022-01-01 RX ADMIN — MAGNESIUM HYDROXIDE 30 ML: 400 SUSPENSION ORAL at 04:45

## 2022-01-01 RX ADMIN — OXYCODONE HYDROCHLORIDE 10 MG: 10 TABLET, FILM COATED, EXTENDED RELEASE ORAL at 16:53

## 2022-01-01 RX ADMIN — CLONAZEPAM 0.5 MG: 0.5 TABLET ORAL at 17:36

## 2022-01-01 RX ADMIN — GABAPENTIN 100 MG: 100 CAPSULE ORAL at 17:21

## 2022-01-01 RX ADMIN — METOCLOPRAMIDE 10 MG: 10 TABLET ORAL at 06:14

## 2022-01-01 RX ADMIN — OMEPRAZOLE 40 MG: 20 CAPSULE, DELAYED RELEASE ORAL at 04:04

## 2022-01-01 RX ADMIN — PRAVASTATIN SODIUM 20 MG: 20 TABLET ORAL at 12:10

## 2022-01-01 RX ADMIN — SENNOSIDES AND DOCUSATE SODIUM 2 TABLET: 50; 8.6 TABLET ORAL at 06:00

## 2022-01-01 RX ADMIN — OXYCODONE HYDROCHLORIDE AND ACETAMINOPHEN 500 MG: 500 TABLET ORAL at 05:24

## 2022-01-01 RX ADMIN — MORPHINE SULFATE 10 MG: 10 INJECTION INTRAVENOUS at 10:11

## 2022-01-01 RX ADMIN — OXYCODONE HYDROCHLORIDE 15 MG: 10 TABLET ORAL at 05:38

## 2022-01-01 RX ADMIN — FLUDROCORTISONE ACETATE 0.1 MG: 0.1 TABLET ORAL at 06:37

## 2022-01-01 RX ADMIN — TACROLIMUS 2 MG: 1 CAPSULE ORAL at 17:21

## 2022-01-01 RX ADMIN — MORPHINE SULFATE 4 MG: 4 INJECTION INTRAVENOUS at 16:24

## 2022-01-01 RX ADMIN — MORPHINE SULFATE 10 MG: 10 INJECTION INTRAVENOUS at 12:16

## 2022-01-01 RX ADMIN — MORPHINE SULFATE 15 MG: 15 TABLET, FILM COATED, EXTENDED RELEASE ORAL at 06:38

## 2022-01-01 RX ADMIN — AMBRISENTAN 10 MG: 10 TABLET, FILM COATED ORAL at 05:32

## 2022-01-01 RX ADMIN — TADALAFIL 20 MG: 20 TABLET, FILM COATED ORAL at 06:00

## 2022-01-01 RX ADMIN — CEFTRIAXONE SODIUM 1 G: 1 INJECTION, POWDER, FOR SOLUTION INTRAMUSCULAR; INTRAVENOUS at 18:27

## 2022-01-01 RX ADMIN — OXYCODONE HYDROCHLORIDE 10 MG: 10 TABLET, FILM COATED, EXTENDED RELEASE ORAL at 21:16

## 2022-01-01 RX ADMIN — FLUDROCORTISONE ACETATE 0.1 MG: 0.1 TABLET ORAL at 05:48

## 2022-01-01 RX ADMIN — MIDAZOLAM HYDROCHLORIDE 1 MG: 1 INJECTION, SOLUTION INTRAMUSCULAR; INTRAVENOUS at 13:26

## 2022-01-01 RX ADMIN — ONDANSETRON 4 MG: 2 INJECTION INTRAMUSCULAR; INTRAVENOUS at 12:48

## 2022-01-01 RX ADMIN — OXYCODONE HYDROCHLORIDE 15 MG: 10 TABLET ORAL at 10:46

## 2022-01-01 RX ADMIN — AMBRISENTAN 10 MG: 10 TABLET, FILM COATED ORAL at 04:03

## 2022-01-01 RX ADMIN — ONDANSETRON 4 MG: 2 INJECTION INTRAMUSCULAR; INTRAVENOUS at 13:14

## 2022-01-01 RX ADMIN — PIPERACILLIN AND TAZOBACTAM 3.38 G: 3; .375 INJECTION, POWDER, LYOPHILIZED, FOR SOLUTION INTRAVENOUS; PARENTERAL at 13:49

## 2022-01-01 RX ADMIN — LACOSAMIDE 100 MG: 100 TABLET, FILM COATED ORAL at 17:15

## 2022-01-01 RX ADMIN — OXYCODONE HYDROCHLORIDE 10 MG: 10 TABLET ORAL at 23:01

## 2022-01-01 RX ADMIN — OXYCODONE HYDROCHLORIDE 10 MG: 10 TABLET ORAL at 01:00

## 2022-01-01 RX ADMIN — SODIUM CHLORIDE: 9 INJECTION, SOLUTION INTRAVENOUS at 21:35

## 2022-01-01 RX ADMIN — MYCOPHENOLATE MOFETIL 250 MG: 250 CAPSULE ORAL at 05:24

## 2022-01-01 RX ADMIN — MYCOPHENOLATE MOFETIL 250 MG: 250 CAPSULE ORAL at 17:21

## 2022-01-01 RX ADMIN — OXYCODONE HYDROCHLORIDE 10 MG: 10 TABLET ORAL at 08:25

## 2022-01-01 RX ADMIN — IOHEXOL 80 ML: 350 INJECTION, SOLUTION INTRAVENOUS at 16:23

## 2022-01-01 RX ADMIN — LACOSAMIDE 100 MG: 50 TABLET, FILM COATED ORAL at 18:45

## 2022-01-01 RX ADMIN — ONDANSETRON 4 MG: 2 INJECTION INTRAMUSCULAR; INTRAVENOUS at 04:12

## 2022-01-01 RX ADMIN — MORPHINE SULFATE 15 MG: 15 TABLET, FILM COATED, EXTENDED RELEASE ORAL at 05:46

## 2022-01-01 RX ADMIN — ENOXAPARIN SODIUM 40 MG: 40 INJECTION SUBCUTANEOUS at 05:48

## 2022-01-01 RX ADMIN — SERTRALINE HYDROCHLORIDE 150 MG: 50 TABLET ORAL at 06:37

## 2022-01-01 RX ADMIN — TRAZODONE HYDROCHLORIDE 50 MG: 50 TABLET ORAL at 21:04

## 2022-01-01 RX ADMIN — OXYCODONE 5 MG: 5 TABLET ORAL at 01:09

## 2022-01-01 RX ADMIN — TADALAFIL 20 MG: 20 TABLET, FILM COATED ORAL at 05:38

## 2022-01-01 RX ADMIN — LACOSAMIDE 100 MG: 100 TABLET, FILM COATED ORAL at 17:21

## 2022-01-01 RX ADMIN — ONDANSETRON 4 MG: 2 INJECTION INTRAMUSCULAR; INTRAVENOUS at 15:15

## 2022-01-01 RX ADMIN — LEVETIRACETAM 750 MG: 500 TABLET, FILM COATED ORAL at 17:26

## 2022-01-01 RX ADMIN — TACROLIMUS 2 MG: 1 CAPSULE ORAL at 05:43

## 2022-01-01 RX ADMIN — LACOSAMIDE 100 MG: 100 TABLET, FILM COATED ORAL at 05:21

## 2022-01-01 RX ADMIN — CLONAZEPAM 0.5 MG: 0.5 TABLET ORAL at 21:50

## 2022-01-01 RX ADMIN — AMBRISENTAN 10 MG: 10 TABLET, FILM COATED ORAL at 16:15

## 2022-01-01 RX ADMIN — MORPHINE SULFATE 15 MG: 15 TABLET, FILM COATED, EXTENDED RELEASE ORAL at 16:27

## 2022-01-01 RX ADMIN — LEVETIRACETAM 750 MG: 500 TABLET, FILM COATED ORAL at 18:41

## 2022-01-01 RX ADMIN — MYCOPHENOLATE MOFETIL 250 MG: 250 CAPSULE ORAL at 09:54

## 2022-01-01 RX ADMIN — MORPHINE SULFATE 15 MG: 15 TABLET, FILM COATED, EXTENDED RELEASE ORAL at 06:01

## 2022-01-01 RX ADMIN — OXYCODONE HYDROCHLORIDE 15 MG: 10 TABLET ORAL at 20:05

## 2022-01-01 RX ADMIN — OXYCODONE 5 MG: 5 TABLET ORAL at 03:20

## 2022-01-01 RX ADMIN — SODIUM CHLORIDE: 9 INJECTION, SOLUTION INTRAVENOUS at 01:33

## 2022-01-01 RX ADMIN — MORPHINE SULFATE 20 MG: 10 INJECTION INTRAVENOUS at 16:50

## 2022-01-01 RX ADMIN — TACROLIMUS 2 MG: 1 CAPSULE ORAL at 06:14

## 2022-01-01 RX ADMIN — ONDANSETRON 4 MG: 2 INJECTION INTRAMUSCULAR; INTRAVENOUS at 04:24

## 2022-01-01 RX ADMIN — MULTIPLE VITAMINS W/ MINERALS TAB 1 TABLET: TAB at 05:26

## 2022-01-01 RX ADMIN — DOCUSATE SODIUM 50 MG AND SENNOSIDES 8.6 MG 2 TABLET: 8.6; 5 TABLET, FILM COATED ORAL at 12:11

## 2022-01-01 RX ADMIN — TACROLIMUS 2 MG: 1 CAPSULE ORAL at 04:03

## 2022-01-01 RX ADMIN — OXYCODONE 5 MG: 5 TABLET ORAL at 00:57

## 2022-01-01 RX ADMIN — SODIUM PHOSPHATE, MONOBASIC, MONOHYDRATE 30 MMOL: 276; 142 INJECTION, SOLUTION INTRAVENOUS at 13:41

## 2022-01-01 RX ADMIN — OXYCODONE 5 MG: 5 TABLET ORAL at 14:53

## 2022-01-01 RX ADMIN — INSULIN HUMAN 2 UNITS: 100 INJECTION, SOLUTION PARENTERAL at 17:27

## 2022-01-01 RX ADMIN — FLUDROCORTISONE ACETATE 0.1 MG: 0.1 TABLET ORAL at 06:05

## 2022-01-01 RX ADMIN — CLONAZEPAM 0.5 MG: 0.5 TABLET ORAL at 22:00

## 2022-01-01 RX ADMIN — MYCOPHENOLATE MOFETIL 250 MG: 250 CAPSULE ORAL at 20:27

## 2022-01-01 RX ADMIN — DOCUSATE SODIUM 100 MG: 100 CAPSULE, LIQUID FILLED ORAL at 04:58

## 2022-01-01 RX ADMIN — AMBRISENTAN 10 MG: 10 TABLET, FILM COATED ORAL at 08:00

## 2022-01-01 RX ADMIN — AMITRIPTYLINE HYDROCHLORIDE 25 MG: 25 TABLET, FILM COATED ORAL at 20:51

## 2022-01-01 RX ADMIN — METRONIDAZOLE 500 MG: 500 TABLET ORAL at 05:18

## 2022-01-01 RX ADMIN — LORAZEPAM 4 MG: 2 INJECTION INTRAMUSCULAR; INTRAVENOUS at 15:28

## 2022-01-01 RX ADMIN — MYCOPHENOLATE MOFETIL 250 MG: 250 CAPSULE ORAL at 17:27

## 2022-01-01 RX ADMIN — METOCLOPRAMIDE 10 MG: 10 TABLET ORAL at 17:14

## 2022-01-01 RX ADMIN — ONDANSETRON 4 MG: 4 TABLET, ORALLY DISINTEGRATING ORAL at 09:03

## 2022-01-01 RX ADMIN — METOCLOPRAMIDE 10 MG: 10 TABLET ORAL at 04:02

## 2022-01-01 RX ADMIN — AMBRISENTAN 10 MG: 10 TABLET, FILM COATED ORAL at 06:00

## 2022-01-01 RX ADMIN — ONDANSETRON 4 MG: 4 TABLET, ORALLY DISINTEGRATING ORAL at 06:33

## 2022-01-01 RX ADMIN — PRAVASTATIN SODIUM 20 MG: 20 TABLET ORAL at 04:59

## 2022-01-01 RX ADMIN — TACROLIMUS 2 MG: 1 CAPSULE ORAL at 16:18

## 2022-01-01 RX ADMIN — OXYCODONE HYDROCHLORIDE 10 MG: 10 TABLET ORAL at 18:40

## 2022-01-01 RX ADMIN — OXYBUTYNIN CHLORIDE 10 MG: 5 TABLET, EXTENDED RELEASE ORAL at 06:27

## 2022-01-01 RX ADMIN — MORPHINE SULFATE 4 MG: 4 INJECTION INTRAVENOUS at 21:50

## 2022-01-01 RX ADMIN — OXYCODONE 5 MG: 5 TABLET ORAL at 01:45

## 2022-01-01 RX ADMIN — Medication 400 MG: at 05:25

## 2022-01-01 RX ADMIN — MYCOPHENOLATE MOFETIL 250 MG: 250 CAPSULE ORAL at 06:19

## 2022-01-01 RX ADMIN — QUETIAPINE FUMARATE 25 MG: 25 TABLET ORAL at 20:27

## 2022-01-01 RX ADMIN — TACROLIMUS 2 MG: 1 CAPSULE ORAL at 06:00

## 2022-01-01 RX ADMIN — LACOSAMIDE 100 MG: 100 TABLET, FILM COATED ORAL at 17:30

## 2022-01-01 RX ADMIN — OXYCODONE HYDROCHLORIDE 10 MG: 10 TABLET ORAL at 10:58

## 2022-01-01 RX ADMIN — TACROLIMUS 2 MG: 1 CAPSULE ORAL at 18:07

## 2022-01-01 RX ADMIN — SERTRALINE 150 MG: 100 TABLET, FILM COATED ORAL at 05:31

## 2022-01-01 RX ADMIN — TADALAFIL 20 MG: 20 TABLET, FILM COATED ORAL at 05:44

## 2022-01-01 RX ADMIN — MORPHINE SULFATE 2 MG: 10 INJECTION INTRAVENOUS at 15:58

## 2022-01-01 RX ADMIN — TADALAFIL 20 MG: 20 TABLET ORAL at 06:00

## 2022-01-01 RX ADMIN — SODIUM CHLORIDE: 9 INJECTION, SOLUTION INTRAVENOUS at 09:30

## 2022-01-01 RX ADMIN — POLYETHYLENE GLYCOL 3350 1 PACKET: 17 POWDER, FOR SOLUTION ORAL at 04:58

## 2022-01-01 RX ADMIN — TADALAFIL 20 MG: 20 TABLET, FILM COATED ORAL at 14:00

## 2022-01-01 RX ADMIN — MYCOPHENOLATE MOFETIL 250 MG: 250 CAPSULE ORAL at 21:26

## 2022-01-01 RX ADMIN — FLUDROCORTISONE ACETATE 0.1 MG: 0.1 TABLET ORAL at 05:31

## 2022-01-01 RX ADMIN — LEVETIRACETAM 1500 MG: 500 TABLET, FILM COATED ORAL at 05:34

## 2022-01-01 RX ADMIN — LEVETIRACETAM 750 MG: 500 TABLET, FILM COATED ORAL at 06:00

## 2022-01-01 RX ADMIN — TACROLIMUS 2 MG: 5 CAPSULE ORAL at 21:21

## 2022-01-01 RX ADMIN — ROCURONIUM BROMIDE 50 MG: 10 INJECTION, SOLUTION INTRAVENOUS at 07:54

## 2022-01-01 RX ADMIN — LIDOCAINE 1 PATCH: 50 PATCH TOPICAL at 06:27

## 2022-01-01 RX ADMIN — OXYCODONE HYDROCHLORIDE 10 MG: 10 TABLET ORAL at 16:10

## 2022-01-01 RX ADMIN — SENNOSIDES AND DOCUSATE SODIUM 1 TABLET: 50; 8.6 TABLET ORAL at 19:49

## 2022-01-01 RX ADMIN — SODIUM CHLORIDE: 9 INJECTION, SOLUTION INTRAVENOUS at 21:03

## 2022-01-01 RX ADMIN — HYDROMORPHONE HYDROCHLORIDE 0.5 MG: 1 INJECTION, SOLUTION INTRAMUSCULAR; INTRAVENOUS; SUBCUTANEOUS at 21:07

## 2022-01-01 RX ADMIN — OXYCODONE HYDROCHLORIDE 15 MG: 10 TABLET ORAL at 16:27

## 2022-01-01 RX ADMIN — MORPHINE SULFATE 15 MG: 15 TABLET, FILM COATED, EXTENDED RELEASE ORAL at 16:22

## 2022-01-01 RX ADMIN — ONDANSETRON 4 MG: 2 INJECTION INTRAMUSCULAR; INTRAVENOUS at 08:04

## 2022-01-01 RX ADMIN — LACOSAMIDE 100 MG: 50 TABLET, FILM COATED ORAL at 06:23

## 2022-01-01 RX ADMIN — TADALAFIL 20 MG: 20 TABLET, FILM COATED ORAL at 05:34

## 2022-01-01 RX ADMIN — FLUDROCORTISONE ACETATE 0.1 MG: 0.1 TABLET ORAL at 06:18

## 2022-01-01 RX ADMIN — LORAZEPAM 4 MG: 2 INJECTION INTRAMUSCULAR; INTRAVENOUS at 12:29

## 2022-01-01 RX ADMIN — LACOSAMIDE 100 MG: 100 TABLET, FILM COATED ORAL at 16:27

## 2022-01-01 RX ADMIN — PRAVASTATIN SODIUM 20 MG: 20 TABLET ORAL at 06:18

## 2022-01-01 RX ADMIN — TRAZODONE HYDROCHLORIDE 50 MG: 50 TABLET ORAL at 01:32

## 2022-01-01 RX ADMIN — LEVETIRACETAM 750 MG: 500 TABLET, FILM COATED ORAL at 16:20

## 2022-01-01 RX ADMIN — FUROSEMIDE 20 MG: 20 TABLET ORAL at 05:20

## 2022-01-01 RX ADMIN — FUROSEMIDE 20 MG: 20 TABLET ORAL at 04:44

## 2022-01-01 RX ADMIN — LACOSAMIDE 100 MG: 100 TABLET, FILM COATED ORAL at 18:24

## 2022-01-01 RX ADMIN — Medication 400 MG: at 05:31

## 2022-01-01 RX ADMIN — MORPHINE SULFATE 4 MG: 4 INJECTION INTRAVENOUS at 18:46

## 2022-01-01 RX ADMIN — SODIUM CHLORIDE, POTASSIUM CHLORIDE, SODIUM LACTATE AND CALCIUM CHLORIDE 1000 ML: 600; 310; 30; 20 INJECTION, SOLUTION INTRAVENOUS at 15:48

## 2022-01-01 RX ADMIN — ONDANSETRON 4 MG: 2 INJECTION INTRAMUSCULAR; INTRAVENOUS at 15:18

## 2022-01-01 RX ADMIN — POTASSIUM CHLORIDE 10 MEQ: 7.46 INJECTION, SOLUTION INTRAVENOUS at 15:35

## 2022-01-01 RX ADMIN — LACOSAMIDE 100 MG: 100 TABLET, FILM COATED ORAL at 05:06

## 2022-01-01 RX ADMIN — OXYCODONE 5 MG: 5 TABLET ORAL at 02:55

## 2022-01-01 RX ADMIN — SERTRALINE HYDROCHLORIDE 100 MG: 100 TABLET ORAL at 05:06

## 2022-01-01 RX ADMIN — CLONAZEPAM 0.5 MG: 0.5 TABLET ORAL at 05:37

## 2022-01-01 RX ADMIN — LEVETIRACETAM 1500 MG: 500 TABLET, FILM COATED ORAL at 17:14

## 2022-01-01 RX ADMIN — MYCOPHENOLATE MOFETIL 250 MG: 250 CAPSULE ORAL at 17:13

## 2022-01-01 RX ADMIN — SENNOSIDES AND DOCUSATE SODIUM 2 TABLET: 50; 8.6 TABLET ORAL at 17:38

## 2022-01-01 RX ADMIN — TACROLIMUS 2 MG: 1 CAPSULE ORAL at 17:44

## 2022-01-01 RX ADMIN — PIPERACILLIN AND TAZOBACTAM 3.38 G: 3; .375 INJECTION, POWDER, LYOPHILIZED, FOR SOLUTION INTRAVENOUS; PARENTERAL at 21:19

## 2022-01-01 RX ADMIN — MORPHINE SULFATE 4 MG: 4 INJECTION INTRAVENOUS at 19:25

## 2022-01-01 RX ADMIN — ONDANSETRON 4 MG: 2 INJECTION INTRAMUSCULAR; INTRAVENOUS at 17:03

## 2022-01-01 RX ADMIN — INSULIN HUMAN 1 UNITS: 100 INJECTION, SOLUTION PARENTERAL at 10:42

## 2022-01-01 RX ADMIN — LACOSAMIDE 100 MG: 100 TABLET, FILM COATED ORAL at 05:44

## 2022-01-01 RX ADMIN — OXYCODONE 5 MG: 5 TABLET ORAL at 14:57

## 2022-01-01 RX ADMIN — OXYCODONE 5 MG: 5 TABLET ORAL at 08:20

## 2022-01-01 RX ADMIN — PRAVASTATIN SODIUM 20 MG: 20 TABLET ORAL at 07:01

## 2022-01-01 RX ADMIN — CLONAZEPAM 0.5 MG: 1 TABLET ORAL at 04:43

## 2022-01-01 RX ADMIN — OXYCODONE 5 MG: 5 TABLET ORAL at 17:56

## 2022-01-01 RX ADMIN — OXYCODONE HYDROCHLORIDE 10 MG: 10 TABLET ORAL at 02:26

## 2022-01-01 RX ADMIN — POLYETHYLENE GLYCOL 3350 1 PACKET: 17 POWDER, FOR SOLUTION ORAL at 10:39

## 2022-01-01 RX ADMIN — SODIUM CHLORIDE: 9 INJECTION, SOLUTION INTRAVENOUS at 18:05

## 2022-01-01 RX ADMIN — PROPOFOL 20 MG: 10 INJECTION, EMULSION INTRAVENOUS at 07:53

## 2022-01-01 RX ADMIN — SODIUM CHLORIDE: 9 INJECTION, SOLUTION INTRAVENOUS at 21:07

## 2022-01-01 RX ADMIN — OXYCODONE HYDROCHLORIDE 10 MG: 10 TABLET, FILM COATED, EXTENDED RELEASE ORAL at 11:45

## 2022-01-01 RX ADMIN — ENOXAPARIN SODIUM 40 MG: 40 INJECTION SUBCUTANEOUS at 05:09

## 2022-01-01 RX ADMIN — DOCUSATE SODIUM 50 MG AND SENNOSIDES 8.6 MG 1 TABLET: 8.6; 5 TABLET, FILM COATED ORAL at 05:31

## 2022-01-01 RX ADMIN — ONDANSETRON 4 MG: 2 INJECTION INTRAMUSCULAR; INTRAVENOUS at 18:46

## 2022-01-01 RX ADMIN — GABAPENTIN 100 MG: 100 CAPSULE ORAL at 06:05

## 2022-01-01 RX ADMIN — METOCLOPRAMIDE 10 MG: 10 TABLET ORAL at 06:23

## 2022-01-01 RX ADMIN — OXYCODONE HYDROCHLORIDE 10 MG: 10 TABLET ORAL at 19:52

## 2022-01-01 RX ADMIN — SENNOSIDES AND DOCUSATE SODIUM 2 TABLET: 50; 8.6 TABLET ORAL at 17:13

## 2022-01-01 RX ADMIN — LACOSAMIDE 100 MG: 100 TABLET, FILM COATED ORAL at 21:26

## 2022-01-01 RX ADMIN — LACOSAMIDE 100 MG: 50 TABLET, FILM COATED ORAL at 06:01

## 2022-01-01 RX ADMIN — OXYCODONE 5 MG: 5 TABLET ORAL at 00:48

## 2022-01-01 RX ADMIN — TACROLIMUS 2 MG: 5 CAPSULE ORAL at 12:12

## 2022-01-01 RX ADMIN — Medication 5 MG: at 20:36

## 2022-01-01 RX ADMIN — ONDANSETRON 4 MG: 2 INJECTION INTRAMUSCULAR; INTRAVENOUS at 20:43

## 2022-01-01 RX ADMIN — SERTRALINE HYDROCHLORIDE 150 MG: 50 TABLET ORAL at 05:49

## 2022-01-01 RX ADMIN — OMEPRAZOLE 40 MG: 20 CAPSULE, DELAYED RELEASE ORAL at 05:27

## 2022-01-01 RX ADMIN — HYDROMORPHONE HYDROCHLORIDE 0.25 MG: 1 INJECTION, SOLUTION INTRAMUSCULAR; INTRAVENOUS; SUBCUTANEOUS at 19:53

## 2022-01-01 RX ADMIN — PIPERACILLIN AND TAZOBACTAM 3.38 G: 3; .375 INJECTION, POWDER, LYOPHILIZED, FOR SOLUTION INTRAVENOUS; PARENTERAL at 04:13

## 2022-01-01 RX ADMIN — TACROLIMUS 2 MG: 1 CAPSULE ORAL at 17:14

## 2022-01-01 RX ADMIN — MYCOPHENOLATE MOFETIL 250 MG: 250 CAPSULE ORAL at 08:38

## 2022-01-01 RX ADMIN — OXYCODONE 5 MG: 5 TABLET ORAL at 11:51

## 2022-01-01 RX ADMIN — MYCOPHENOLATE MOFETIL 250 MG: 250 CAPSULE ORAL at 17:20

## 2022-01-01 RX ADMIN — OXYCODONE 5 MG: 5 TABLET ORAL at 04:11

## 2022-01-01 RX ADMIN — HYDROMORPHONE HYDROCHLORIDE 1 MG: 1 INJECTION, SOLUTION INTRAMUSCULAR; INTRAVENOUS; SUBCUTANEOUS at 21:47

## 2022-01-01 RX ADMIN — HYDROMORPHONE HYDROCHLORIDE 1 MG: 1 INJECTION, SOLUTION INTRAMUSCULAR; INTRAVENOUS; SUBCUTANEOUS at 19:20

## 2022-01-01 RX ADMIN — SENNOSIDES AND DOCUSATE SODIUM 2 TABLET: 50; 8.6 TABLET ORAL at 17:27

## 2022-01-01 RX ADMIN — CLONAZEPAM 0.5 MG: 0.5 TABLET ORAL at 18:37

## 2022-01-01 RX ADMIN — MYCOPHENOLATE MOFETIL 250 MG: 250 CAPSULE ORAL at 06:14

## 2022-01-01 RX ADMIN — LEVOTHYROXINE SODIUM 137 MCG: 137 TABLET ORAL at 12:11

## 2022-01-01 RX ADMIN — CLONAZEPAM 0.5 MG: 1 TABLET ORAL at 17:11

## 2022-01-01 RX ADMIN — OXYCODONE HYDROCHLORIDE 10 MG: 10 TABLET ORAL at 14:59

## 2022-01-01 RX ADMIN — OXYCODONE HYDROCHLORIDE 15 MG: 10 TABLET ORAL at 15:56

## 2022-01-01 RX ADMIN — MORPHINE SULFATE 10 MG: 10 INJECTION INTRAVENOUS at 20:19

## 2022-01-01 RX ADMIN — CLONAZEPAM 0.5 MG: 0.5 TABLET ORAL at 06:36

## 2022-01-01 RX ADMIN — TACROLIMUS 2 MG: 1 CAPSULE ORAL at 04:53

## 2022-01-01 RX ADMIN — MYCOPHENOLATE MOFETIL 250 MG: 250 CAPSULE ORAL at 05:21

## 2022-01-01 RX ADMIN — MORPHINE SULFATE 15 MG: 15 TABLET, FILM COATED, EXTENDED RELEASE ORAL at 05:23

## 2022-01-01 RX ADMIN — SERTRALINE 150 MG: 100 TABLET, FILM COATED ORAL at 10:51

## 2022-01-01 RX ADMIN — LEVOTHYROXINE SODIUM 137 MCG: 137 TABLET ORAL at 06:00

## 2022-01-01 RX ADMIN — MYCOPHENOLATE MOFETIL 250 MG: 250 CAPSULE ORAL at 12:10

## 2022-01-01 RX ADMIN — FLUDROCORTISONE ACETATE 0.1 MG: 0.1 TABLET ORAL at 05:05

## 2022-01-01 RX ADMIN — OMEPRAZOLE 40 MG: 20 CAPSULE, DELAYED RELEASE ORAL at 05:05

## 2022-01-01 RX ADMIN — MAGNESIUM HYDROXIDE 30 ML: 400 SUSPENSION ORAL at 05:35

## 2022-01-01 RX ADMIN — MYCOPHENOLATE MOFETIL 250 MG: 250 CAPSULE ORAL at 06:23

## 2022-01-01 RX ADMIN — MYCOPHENOLATE MOFETIL 250 MG: 250 CAPSULE ORAL at 17:11

## 2022-01-01 RX ADMIN — ONDANSETRON 4 MG: 2 INJECTION INTRAMUSCULAR; INTRAVENOUS at 08:28

## 2022-01-01 RX ADMIN — LACOSAMIDE 100 MG: 100 TABLET, FILM COATED ORAL at 18:30

## 2022-01-01 RX ADMIN — ONDANSETRON 4 MG: 2 INJECTION INTRAMUSCULAR; INTRAVENOUS at 17:13

## 2022-01-01 RX ADMIN — TACROLIMUS 2 MG: 1 CAPSULE ORAL at 05:05

## 2022-01-01 RX ADMIN — MAGNESIUM SULFATE IN WATER 4 G: 40 INJECTION, SOLUTION INTRAVENOUS at 15:40

## 2022-01-01 RX ADMIN — METRONIDAZOLE 500 MG: 500 TABLET ORAL at 06:38

## 2022-01-01 RX ADMIN — SERTRALINE HYDROCHLORIDE 150 MG: 50 TABLET ORAL at 06:00

## 2022-01-01 RX ADMIN — DOCUSATE SODIUM 100 MG: 100 CAPSULE, LIQUID FILLED ORAL at 06:57

## 2022-01-01 RX ADMIN — LEVETIRACETAM 750 MG: 500 TABLET, FILM COATED ORAL at 17:40

## 2022-01-01 RX ADMIN — ENOXAPARIN SODIUM 40 MG: 40 INJECTION SUBCUTANEOUS at 17:23

## 2022-01-01 RX ADMIN — LORAZEPAM 0.5 MG: 2 INJECTION INTRAMUSCULAR; INTRAVENOUS at 16:51

## 2022-01-01 RX ADMIN — LORAZEPAM 1 MG: 2 INJECTION INTRAMUSCULAR; INTRAVENOUS at 11:49

## 2022-01-01 RX ADMIN — Medication 2 TABLET: at 17:31

## 2022-01-01 RX ADMIN — OXYCODONE HYDROCHLORIDE 10 MG: 10 TABLET ORAL at 15:29

## 2022-01-01 RX ADMIN — CLONAZEPAM 0.5 MG: 1 TABLET ORAL at 23:56

## 2022-01-01 RX ADMIN — LEVETIRACETAM 1500 MG: 500 TABLET, FILM COATED ORAL at 18:20

## 2022-01-01 RX ADMIN — SUGAMMADEX 200 MG: 100 INJECTION, SOLUTION INTRAVENOUS at 15:05

## 2022-01-01 RX ADMIN — METOCLOPRAMIDE 5 MG: 10 TABLET ORAL at 18:42

## 2022-01-01 RX ADMIN — FENTANYL CITRATE 50 MCG: 50 INJECTION, SOLUTION INTRAMUSCULAR; INTRAVENOUS at 09:07

## 2022-01-01 RX ADMIN — ONDANSETRON 4 MG: 4 TABLET, ORALLY DISINTEGRATING ORAL at 10:45

## 2022-01-01 RX ADMIN — METOCLOPRAMIDE 10 MG: 10 TABLET ORAL at 17:41

## 2022-01-01 RX ADMIN — POLYETHYLENE GLYCOL 3350 1 PACKET: 17 POWDER, FOR SOLUTION ORAL at 18:00

## 2022-01-01 RX ADMIN — METOCLOPRAMIDE 10 MG: 10 TABLET ORAL at 17:20

## 2022-01-01 RX ADMIN — OMEPRAZOLE 40 MG: 20 CAPSULE, DELAYED RELEASE ORAL at 06:01

## 2022-01-01 RX ADMIN — MYCOPHENOLATE MOFETIL 250 MG: 250 CAPSULE ORAL at 05:20

## 2022-01-01 RX ADMIN — MORPHINE SULFATE 10 MG: 10 INJECTION INTRAVENOUS at 02:42

## 2022-01-01 RX ADMIN — OXYCODONE 5 MG: 5 TABLET ORAL at 09:30

## 2022-01-01 RX ADMIN — TACROLIMUS 4 MG: 1 CAPSULE ORAL at 06:38

## 2022-01-01 RX ADMIN — ROCURONIUM BROMIDE 50 MG: 10 INJECTION, SOLUTION INTRAVENOUS at 13:26

## 2022-01-01 RX ADMIN — OXYCODONE HYDROCHLORIDE 10 MG: 10 TABLET ORAL at 19:29

## 2022-01-01 RX ADMIN — Medication 400 MG: at 06:34

## 2022-01-01 RX ADMIN — OXYCODONE HYDROCHLORIDE 15 MG: 10 TABLET ORAL at 20:37

## 2022-01-01 RX ADMIN — DOCUSATE SODIUM 50 MG AND SENNOSIDES 8.6 MG 2 TABLET: 8.6; 5 TABLET, FILM COATED ORAL at 06:17

## 2022-01-01 RX ADMIN — AMITRIPTYLINE HYDROCHLORIDE 25 MG: 25 TABLET, FILM COATED ORAL at 21:42

## 2022-01-01 RX ADMIN — MYCOPHENOLATE MOFETIL 250 MG: 250 CAPSULE ORAL at 05:47

## 2022-01-01 RX ADMIN — OXYCODONE HYDROCHLORIDE 10 MG: 10 TABLET ORAL at 06:02

## 2022-01-01 RX ADMIN — Medication 400 MG: at 05:47

## 2022-01-01 RX ADMIN — LEVETIRACETAM 750 MG: 500 TABLET, FILM COATED ORAL at 17:12

## 2022-01-01 RX ADMIN — MYCOPHENOLATE MOFETIL 250 MG: 250 CAPSULE ORAL at 10:52

## 2022-01-01 RX ADMIN — TACROLIMUS 2 MG: 1 CAPSULE ORAL at 06:17

## 2022-01-01 RX ADMIN — SERTRALINE HYDROCHLORIDE 100 MG: 100 TABLET ORAL at 21:04

## 2022-01-01 RX ADMIN — SERTRALINE HYDROCHLORIDE 150 MG: 50 TABLET ORAL at 06:30

## 2022-01-01 RX ADMIN — LACOSAMIDE 100 MG: 100 TABLET, FILM COATED ORAL at 04:53

## 2022-01-01 RX ADMIN — OXYCODONE 5 MG: 5 TABLET ORAL at 00:11

## 2022-01-01 RX ADMIN — METOCLOPRAMIDE 10 MG: 10 TABLET ORAL at 05:51

## 2022-01-01 RX ADMIN — OXYCODONE 5 MG: 5 TABLET ORAL at 10:26

## 2022-01-01 RX ADMIN — METOCLOPRAMIDE 10 MG: 10 TABLET ORAL at 05:26

## 2022-01-01 RX ADMIN — METRONIDAZOLE 500 MG: 500 TABLET ORAL at 21:50

## 2022-01-01 RX ADMIN — OMEPRAZOLE 40 MG: 20 CAPSULE, DELAYED RELEASE ORAL at 06:00

## 2022-01-01 RX ADMIN — AMITRIPTYLINE HYDROCHLORIDE 25 MG: 25 TABLET, FILM COATED ORAL at 20:38

## 2022-01-01 RX ADMIN — Medication 1 TABLET: at 18:19

## 2022-01-01 RX ADMIN — OXYCODONE 5 MG: 5 TABLET ORAL at 21:02

## 2022-01-01 RX ADMIN — FLUDROCORTISONE ACETATE 0.1 MG: 0.1 TABLET ORAL at 10:51

## 2022-01-01 RX ADMIN — OXYCODONE HYDROCHLORIDE 15 MG: 10 TABLET ORAL at 08:16

## 2022-01-01 RX ADMIN — LIDOCAINE HYDROCHLORIDE 100 MG: 20 INJECTION, SOLUTION EPIDURAL; INFILTRATION; INTRACAUDAL at 15:37

## 2022-01-01 RX ADMIN — LEVETIRACETAM 750 MG: 500 TABLET, FILM COATED ORAL at 17:31

## 2022-01-01 RX ADMIN — LACOSAMIDE 100 MG: 100 TABLET, FILM COATED ORAL at 17:04

## 2022-01-01 RX ADMIN — DOCUSATE SODIUM 50 MG AND SENNOSIDES 8.6 MG 1 TABLET: 8.6; 5 TABLET, FILM COATED ORAL at 20:05

## 2022-01-01 RX ADMIN — PRAVASTATIN SODIUM 20 MG: 20 TABLET ORAL at 05:35

## 2022-01-01 RX ADMIN — LEVETIRACETAM 1500 MG: 500 TABLET, FILM COATED ORAL at 04:51

## 2022-01-01 RX ADMIN — LACOSAMIDE 100 MG: 10 INJECTION INTRAVENOUS at 17:31

## 2022-01-01 RX ADMIN — TACROLIMUS 2 MG: 1 CAPSULE ORAL at 17:38

## 2022-01-01 RX ADMIN — PRAVASTATIN SODIUM 20 MG: 20 TABLET ORAL at 06:04

## 2022-01-01 RX ADMIN — LACOSAMIDE 100 MG: 100 TABLET, FILM COATED ORAL at 06:00

## 2022-01-01 RX ADMIN — OXYCODONE HYDROCHLORIDE 10 MG: 10 TABLET ORAL at 01:59

## 2022-01-01 RX ADMIN — MORPHINE SULFATE 20 MG: 10 INJECTION INTRAVENOUS at 13:39

## 2022-01-01 RX ADMIN — LACOSAMIDE 100 MG: 50 TABLET, FILM COATED ORAL at 17:46

## 2022-01-01 RX ADMIN — TACROLIMUS 2 MG: 1 CAPSULE ORAL at 05:31

## 2022-01-01 RX ADMIN — LIDOCAINE 1 PATCH: 50 PATCH TOPICAL at 09:41

## 2022-01-01 RX ADMIN — OXYCODONE HYDROCHLORIDE 15 MG: 10 TABLET ORAL at 04:11

## 2022-01-01 RX ADMIN — SODIUM CHLORIDE, POTASSIUM CHLORIDE, SODIUM LACTATE AND CALCIUM CHLORIDE: 600; 310; 30; 20 INJECTION, SOLUTION INTRAVENOUS at 16:09

## 2022-01-01 RX ADMIN — POTASSIUM CHLORIDE 20 MEQ: 1500 TABLET, EXTENDED RELEASE ORAL at 05:26

## 2022-01-01 RX ADMIN — PRAVASTATIN SODIUM 20 MG: 20 TABLET ORAL at 05:28

## 2022-01-01 RX ADMIN — PRAVASTATIN SODIUM 20 MG: 20 TABLET ORAL at 05:31

## 2022-01-01 RX ADMIN — METOCLOPRAMIDE 10 MG: 10 TABLET ORAL at 06:01

## 2022-01-01 RX ADMIN — MORPHINE SULFATE 15 MG: 15 TABLET, FILM COATED, EXTENDED RELEASE ORAL at 16:17

## 2022-01-01 RX ADMIN — HYDROMORPHONE HYDROCHLORIDE 0.25 MG: 1 INJECTION, SOLUTION INTRAMUSCULAR; INTRAVENOUS; SUBCUTANEOUS at 08:57

## 2022-01-01 RX ADMIN — METOCLOPRAMIDE 5 MG: 10 TABLET ORAL at 06:00

## 2022-01-01 RX ADMIN — TACROLIMUS 4 MG: 1 CAPSULE ORAL at 06:29

## 2022-01-01 RX ADMIN — POTASSIUM CHLORIDE 10 MEQ: 7.46 INJECTION, SOLUTION INTRAVENOUS at 14:08

## 2022-01-01 RX ADMIN — CEFTRIAXONE SODIUM 1 G: 1 INJECTION, POWDER, FOR SOLUTION INTRAMUSCULAR; INTRAVENOUS at 17:48

## 2022-01-01 RX ADMIN — FLUDROCORTISONE ACETATE 0.1 MG: 0.1 TABLET ORAL at 07:00

## 2022-01-01 RX ADMIN — TACROLIMUS 2 MG: 1 CAPSULE ORAL at 17:09

## 2022-01-01 RX ADMIN — MAGNESIUM SULFATE HEPTAHYDRATE 2 G: 40 INJECTION, SOLUTION INTRAVENOUS at 10:58

## 2022-01-01 RX ADMIN — INSULIN HUMAN 1 UNITS: 100 INJECTION, SOLUTION PARENTERAL at 20:10

## 2022-01-01 RX ADMIN — ENOXAPARIN SODIUM 40 MG: 40 INJECTION SUBCUTANEOUS at 05:05

## 2022-01-01 RX ADMIN — Medication 1 TABLET: at 17:21

## 2022-01-01 RX ADMIN — CLONAZEPAM 0.5 MG: 0.5 TABLET ORAL at 13:24

## 2022-01-01 RX ADMIN — PRAVASTATIN SODIUM 20 MG: 20 TABLET ORAL at 04:04

## 2022-01-01 RX ADMIN — LEVETIRACETAM 1500 MG: 500 TABLET, FILM COATED ORAL at 06:18

## 2022-01-01 RX ADMIN — HYDROMORPHONE HYDROCHLORIDE 0.25 MG: 1 INJECTION, SOLUTION INTRAMUSCULAR; INTRAVENOUS; SUBCUTANEOUS at 02:29

## 2022-01-01 RX ADMIN — MAGNESIUM SULFATE IN WATER 4 G: 40 INJECTION, SOLUTION INTRAVENOUS at 13:20

## 2022-01-01 RX ADMIN — FUROSEMIDE 20 MG: 20 TABLET ORAL at 05:48

## 2022-01-01 RX ADMIN — Medication 400 MG: at 05:20

## 2022-01-01 RX ADMIN — CLONAZEPAM 0.5 MG: 0.5 TABLET ORAL at 21:14

## 2022-01-01 RX ADMIN — LEVETIRACETAM 750 MG: 500 TABLET, FILM COATED ORAL at 17:08

## 2022-01-01 RX ADMIN — METOCLOPRAMIDE 10 MG: 10 TABLET ORAL at 18:00

## 2022-01-01 RX ADMIN — OMEPRAZOLE 40 MG: 20 CAPSULE, DELAYED RELEASE ORAL at 05:46

## 2022-01-01 RX ADMIN — OXYCODONE 5 MG: 5 TABLET ORAL at 07:51

## 2022-01-01 RX ADMIN — CLONAZEPAM 0.5 MG: 0.5 TABLET ORAL at 12:11

## 2022-01-01 RX ADMIN — LEVETIRACETAM 1500 MG: 500 TABLET, FILM COATED ORAL at 18:30

## 2022-01-01 RX ADMIN — SERTRALINE HYDROCHLORIDE 100 MG: 100 TABLET ORAL at 21:26

## 2022-01-01 RX ADMIN — CEFAZOLIN 2 G: 330 INJECTION, POWDER, FOR SOLUTION INTRAMUSCULAR; INTRAVENOUS at 13:26

## 2022-01-01 RX ADMIN — LEVETIRACETAM 1500 MG: 100 INJECTION, SOLUTION INTRAVENOUS at 18:00

## 2022-01-01 RX ADMIN — MYCOPHENOLATE MOFETIL 250 MG: 250 CAPSULE ORAL at 06:29

## 2022-01-01 RX ADMIN — HYDROMORPHONE HYDROCHLORIDE 0.25 MG: 1 INJECTION, SOLUTION INTRAMUSCULAR; INTRAVENOUS; SUBCUTANEOUS at 22:05

## 2022-01-01 RX ADMIN — Medication 400 MG: at 06:00

## 2022-01-01 RX ADMIN — LEVOTHYROXINE SODIUM 137 MCG: 0.14 TABLET ORAL at 06:14

## 2022-01-01 RX ADMIN — FLUDROCORTISONE ACETATE 0.1 MG: 0.1 TABLET ORAL at 05:20

## 2022-01-01 RX ADMIN — METOCLOPRAMIDE 10 MG: 10 TABLET ORAL at 04:59

## 2022-01-01 RX ADMIN — HYDROMORPHONE HYDROCHLORIDE 0.25 MG: 1 INJECTION, SOLUTION INTRAMUSCULAR; INTRAVENOUS; SUBCUTANEOUS at 12:19

## 2022-01-01 RX ADMIN — MYCOPHENOLATE MOFETIL 250 MG: 250 CAPSULE ORAL at 19:51

## 2022-01-01 RX ADMIN — SODIUM CHLORIDE: 9 INJECTION, SOLUTION INTRAVENOUS at 16:53

## 2022-01-01 RX ADMIN — HYDROMORPHONE HYDROCHLORIDE 1 MG: 1 INJECTION, SOLUTION INTRAMUSCULAR; INTRAVENOUS; SUBCUTANEOUS at 04:04

## 2022-01-01 RX ADMIN — TACROLIMUS 2 MG: 1 CAPSULE ORAL at 06:22

## 2022-01-01 RX ADMIN — MORPHINE SULFATE 15 MG: 15 TABLET ORAL at 15:12

## 2022-01-01 RX ADMIN — LEVETIRACETAM 750 MG: 500 TABLET, FILM COATED ORAL at 07:00

## 2022-01-01 RX ADMIN — LORAZEPAM 1 MG: 2 INJECTION INTRAMUSCULAR; INTRAVENOUS at 00:51

## 2022-01-01 RX ADMIN — LORAZEPAM 1 MG: 2 INJECTION INTRAMUSCULAR; INTRAVENOUS at 17:15

## 2022-01-01 RX ADMIN — POTASSIUM PHOSPHATE, MONOBASIC AND POTASSIUM PHOSPHATE, DIBASIC 30 MMOL: 224; 236 INJECTION, SOLUTION, CONCENTRATE INTRAVENOUS at 19:56

## 2022-01-01 RX ADMIN — POTASSIUM CHLORIDE 20 MEQ: 1500 TABLET, EXTENDED RELEASE ORAL at 04:47

## 2022-01-01 RX ADMIN — ONDANSETRON 4 MG: 2 INJECTION INTRAMUSCULAR; INTRAVENOUS at 03:09

## 2022-01-01 RX ADMIN — ONDANSETRON 4 MG: 4 TABLET, ORALLY DISINTEGRATING ORAL at 19:50

## 2022-01-01 RX ADMIN — MYCOPHENOLATE MOFETIL 250 MG: 250 CAPSULE ORAL at 18:20

## 2022-01-01 RX ADMIN — Medication 2 TABLET: at 17:14

## 2022-01-01 RX ADMIN — OXYCODONE HYDROCHLORIDE 15 MG: 10 TABLET ORAL at 07:51

## 2022-01-01 RX ADMIN — MULTIPLE VITAMINS W/ MINERALS TAB 1 TABLET: TAB at 06:00

## 2022-01-01 RX ADMIN — SERTRALINE 150 MG: 100 TABLET, FILM COATED ORAL at 04:03

## 2022-01-01 RX ADMIN — FENTANYL CITRATE 50 MCG: 50 INJECTION, SOLUTION INTRAMUSCULAR; INTRAVENOUS at 13:26

## 2022-01-01 RX ADMIN — GABAPENTIN 100 MG: 100 CAPSULE ORAL at 12:46

## 2022-01-01 RX ADMIN — DOCUSATE SODIUM 100 MG: 100 CAPSULE, LIQUID FILLED ORAL at 16:19

## 2022-01-01 RX ADMIN — ONDANSETRON 4 MG: 2 INJECTION INTRAMUSCULAR; INTRAVENOUS at 10:18

## 2022-01-01 RX ADMIN — POTASSIUM CHLORIDE 10 MEQ: 7.46 INJECTION, SOLUTION INTRAVENOUS at 18:27

## 2022-01-01 RX ADMIN — POLYETHYLENE GLYCOL 3350 1 PACKET: 17 POWDER, FOR SOLUTION ORAL at 17:44

## 2022-01-01 RX ADMIN — POLYETHYLENE GLYCOL 3350 1 PACKET: 17 POWDER, FOR SOLUTION ORAL at 05:20

## 2022-01-01 RX ADMIN — CLONAZEPAM 0.5 MG: 0.5 TABLET ORAL at 14:03

## 2022-01-01 RX ADMIN — DOCUSATE SODIUM 50 MG AND SENNOSIDES 8.6 MG 1 TABLET: 8.6; 5 TABLET, FILM COATED ORAL at 21:04

## 2022-01-01 RX ADMIN — LORAZEPAM 1 MG: 2 INJECTION INTRAMUSCULAR; INTRAVENOUS at 10:09

## 2022-01-01 RX ADMIN — POTASSIUM PHOSPHATE, MONOBASIC AND POTASSIUM PHOSPHATE, DIBASIC 30 MMOL: 224; 236 INJECTION, SOLUTION, CONCENTRATE INTRAVENOUS at 10:31

## 2022-01-01 RX ADMIN — HYDROMORPHONE HYDROCHLORIDE 0.25 MG: 1 INJECTION, SOLUTION INTRAMUSCULAR; INTRAVENOUS; SUBCUTANEOUS at 18:05

## 2022-01-01 RX ADMIN — LACOSAMIDE 100 MG: 50 TABLET, FILM COATED ORAL at 17:37

## 2022-01-01 RX ADMIN — OXYCODONE 5 MG: 5 TABLET ORAL at 16:55

## 2022-01-01 RX ADMIN — CLOSTRIDIUM TETANI TOXOID ANTIGEN (FORMALDEHYDE INACTIVATED), CORYNEBACTERIUM DIPHTHERIAE TOXOID ANTIGEN (FORMALDEHYDE INACTIVATED), BORDETELLA PERTUSSIS TOXOID ANTIGEN (GLUTARALDEHYDE INACTIVATED), BORDETELLA PERTUSSIS FILAMENTOUS HEMAGGLUTININ ANTIGEN (FORMALDEHYDE INACTIVATED), BORDETELLA PERTUSSIS PERTACTIN ANTIGEN, AND BORDETELLA PERTUSSIS FIMBRIAE 2/3 ANTIGEN 0.5 ML: 5; 2; 2.5; 5; 3; 5 INJECTION, SUSPENSION INTRAMUSCULAR at 07:05

## 2022-01-01 RX ADMIN — TACROLIMUS 4 MG: 1 CAPSULE ORAL at 05:36

## 2022-01-01 RX ADMIN — SERTRALINE HYDROCHLORIDE 150 MG: 50 TABLET ORAL at 06:23

## 2022-01-01 RX ADMIN — GABAPENTIN 100 MG: 100 CAPSULE ORAL at 18:20

## 2022-01-01 RX ADMIN — MYCOPHENOLATE MOFETIL 250 MG: 250 CAPSULE ORAL at 07:49

## 2022-01-01 RX ADMIN — LACOSAMIDE 100 MG: 100 TABLET, FILM COATED ORAL at 05:29

## 2022-01-01 RX ADMIN — METRONIDAZOLE 500 MG: 500 INJECTION, SOLUTION INTRAVENOUS at 12:47

## 2022-01-01 RX ADMIN — AMBRISENTAN 10 MG: 10 TABLET, FILM COATED ORAL at 05:33

## 2022-01-01 RX ADMIN — OXYCODONE HYDROCHLORIDE 10 MG: 10 TABLET ORAL at 11:36

## 2022-01-01 RX ADMIN — OXYCODONE HYDROCHLORIDE 15 MG: 10 TABLET ORAL at 09:42

## 2022-01-01 RX ADMIN — METOCLOPRAMIDE 10 MG: 10 TABLET ORAL at 18:28

## 2022-01-01 RX ADMIN — LIDOCAINE 1 PATCH: 50 PATCH TOPICAL at 05:57

## 2022-01-01 RX ADMIN — PROPOFOL 120 MG: 10 INJECTION, EMULSION INTRAVENOUS at 15:37

## 2022-01-01 RX ADMIN — SERTRALINE HYDROCHLORIDE 100 MG: 100 TABLET ORAL at 21:51

## 2022-01-01 RX ADMIN — TACROLIMUS 2 MG: 1 CAPSULE ORAL at 18:00

## 2022-01-01 RX ADMIN — ONDANSETRON 4 MG: 4 TABLET, ORALLY DISINTEGRATING ORAL at 13:04

## 2022-01-01 RX ADMIN — SERTRALINE 150 MG: 100 TABLET, FILM COATED ORAL at 05:33

## 2022-01-01 RX ADMIN — METOCLOPRAMIDE 10 MG: 10 TABLET ORAL at 10:52

## 2022-01-01 RX ADMIN — FENTANYL CITRATE 200 MCG: 50 INJECTION, SOLUTION INTRAMUSCULAR; INTRAVENOUS at 07:53

## 2022-01-01 RX ADMIN — SODIUM CHLORIDE, POTASSIUM CHLORIDE, SODIUM LACTATE AND CALCIUM CHLORIDE: 600; 310; 30; 20 INJECTION, SOLUTION INTRAVENOUS at 13:23

## 2022-01-01 RX ADMIN — PIPERACILLIN AND TAZOBACTAM 3.38 G: 3; .375 INJECTION, POWDER, LYOPHILIZED, FOR SOLUTION INTRAVENOUS; PARENTERAL at 04:05

## 2022-01-01 RX ADMIN — AMITRIPTYLINE HYDROCHLORIDE 25 MG: 25 TABLET, FILM COATED ORAL at 20:02

## 2022-01-01 RX ADMIN — ONDANSETRON 4 MG: 4 TABLET, ORALLY DISINTEGRATING ORAL at 16:23

## 2022-01-01 RX ADMIN — OXYCODONE HYDROCHLORIDE 10 MG: 10 TABLET ORAL at 10:42

## 2022-01-01 RX ADMIN — ROCURONIUM BROMIDE 35 MG: 10 INJECTION, SOLUTION INTRAVENOUS at 15:37

## 2022-01-01 RX ADMIN — OMEPRAZOLE 40 MG: 20 CAPSULE, DELAYED RELEASE ORAL at 05:49

## 2022-01-01 RX ADMIN — MORPHINE SULFATE 5 MG: 10 INJECTION INTRAVENOUS at 16:23

## 2022-01-01 RX ADMIN — SODIUM CHLORIDE, POTASSIUM CHLORIDE, SODIUM LACTATE AND CALCIUM CHLORIDE: 600; 310; 30; 20 INJECTION, SOLUTION INTRAVENOUS at 15:33

## 2022-01-01 RX ADMIN — LACOSAMIDE 100 MG: 100 TABLET, FILM COATED ORAL at 05:34

## 2022-01-01 RX ADMIN — LEVETIRACETAM 1500 MG: 500 TABLET, FILM COATED ORAL at 06:57

## 2022-01-01 RX ADMIN — OXYCODONE HYDROCHLORIDE 10 MG: 10 TABLET ORAL at 06:00

## 2022-01-01 RX ADMIN — LEVETIRACETAM 1500 MG: 500 TABLET, FILM COATED ORAL at 18:31

## 2022-01-01 RX ADMIN — SERTRALINE HYDROCHLORIDE 150 MG: 50 TABLET ORAL at 17:13

## 2022-01-01 RX ADMIN — POLYETHYLENE GLYCOL 3350 1 PACKET: 17 POWDER, FOR SOLUTION ORAL at 05:27

## 2022-01-01 RX ADMIN — HYDROMORPHONE HYDROCHLORIDE 2 MG: 2 INJECTION INTRAMUSCULAR; INTRAVENOUS; SUBCUTANEOUS at 20:19

## 2022-01-01 RX ADMIN — MYCOPHENOLATE MOFETIL 250 MG: 250 CAPSULE ORAL at 17:28

## 2022-01-01 RX ADMIN — TACROLIMUS 2 MG: 1 CAPSULE ORAL at 18:29

## 2022-01-01 RX ADMIN — OXYCODONE HYDROCHLORIDE 10 MG: 10 TABLET ORAL at 21:43

## 2022-01-01 RX ADMIN — POTASSIUM CHLORIDE 10 MEQ: 1500 TABLET, EXTENDED RELEASE ORAL at 10:52

## 2022-01-01 RX ADMIN — METOCLOPRAMIDE 10 MG: 10 TABLET ORAL at 06:00

## 2022-01-01 RX ADMIN — LEVETIRACETAM 750 MG: 500 TABLET, FILM COATED ORAL at 05:33

## 2022-01-01 RX ADMIN — POLYETHYLENE GLYCOL 3350 1 PACKET: 17 POWDER, FOR SOLUTION ORAL at 17:14

## 2022-01-01 RX ADMIN — PRAVASTATIN SODIUM 20 MG: 20 TABLET ORAL at 05:21

## 2022-01-01 RX ADMIN — OXYCODONE HYDROCHLORIDE 15 MG: 10 TABLET ORAL at 11:54

## 2022-01-01 RX ADMIN — GABAPENTIN 100 MG: 100 CAPSULE ORAL at 05:43

## 2022-01-01 RX ADMIN — ONDANSETRON 4 MG: 2 INJECTION INTRAMUSCULAR; INTRAVENOUS at 10:54

## 2022-01-01 RX ADMIN — OMEPRAZOLE 40 MG: 20 CAPSULE, DELAYED RELEASE ORAL at 05:32

## 2022-01-01 RX ADMIN — OXYCODONE 5 MG: 5 TABLET ORAL at 06:48

## 2022-01-01 RX ADMIN — POLYETHYLENE GLYCOL 3350 1 PACKET: 17 POWDER, FOR SOLUTION ORAL at 06:02

## 2022-01-01 RX ADMIN — TRAZODONE HYDROCHLORIDE 50 MG: 50 TABLET ORAL at 21:51

## 2022-01-01 RX ADMIN — OXYCODONE HYDROCHLORIDE 10 MG: 10 TABLET ORAL at 19:43

## 2022-01-01 RX ADMIN — ONDANSETRON 4 MG: 4 TABLET, ORALLY DISINTEGRATING ORAL at 02:43

## 2022-01-01 RX ADMIN — MYCOPHENOLATE MOFETIL 250 MG: 250 CAPSULE ORAL at 06:18

## 2022-01-01 RX ADMIN — OXYCODONE 5 MG: 5 TABLET ORAL at 18:28

## 2022-01-01 RX ADMIN — MORPHINE SULFATE 10 MG: 10 INJECTION INTRAVENOUS at 08:26

## 2022-01-01 RX ADMIN — SERTRALINE HYDROCHLORIDE 100 MG: 100 TABLET ORAL at 06:18

## 2022-01-01 RX ADMIN — TACROLIMUS 2 MG: 1 CAPSULE ORAL at 05:35

## 2022-01-01 RX ADMIN — LEVETIRACETAM 750 MG: 500 TABLET, FILM COATED ORAL at 06:17

## 2022-01-01 RX ADMIN — OXYCODONE HYDROCHLORIDE 10 MG: 10 TABLET ORAL at 09:28

## 2022-01-01 RX ADMIN — LEVETIRACETAM 750 MG: 500 TABLET, FILM COATED ORAL at 05:25

## 2022-01-01 RX ADMIN — IOHEXOL 80 ML: 350 INJECTION, SOLUTION INTRAVENOUS at 11:55

## 2022-01-01 RX ADMIN — Medication 2 TABLET: at 16:17

## 2022-01-01 RX ADMIN — MYCOPHENOLATE MOFETIL 250 MG: 250 CAPSULE ORAL at 05:49

## 2022-01-01 RX ADMIN — OXYCODONE 5 MG: 5 TABLET ORAL at 15:10

## 2022-01-01 RX ADMIN — OMEPRAZOLE 40 MG: 20 CAPSULE, DELAYED RELEASE ORAL at 07:00

## 2022-01-01 RX ADMIN — LEVETIRACETAM 750 MG: 500 TABLET, FILM COATED ORAL at 16:18

## 2022-01-01 RX ADMIN — TADALAFIL 20 MG: 20 TABLET, FILM COATED ORAL at 16:15

## 2022-01-01 RX ADMIN — LEVETIRACETAM 1500 MG: 500 TABLET, FILM COATED ORAL at 05:09

## 2022-01-01 RX ADMIN — OXYCODONE HYDROCHLORIDE 10 MG: 10 TABLET, FILM COATED, EXTENDED RELEASE ORAL at 12:17

## 2022-01-01 RX ADMIN — METOCLOPRAMIDE 10 MG: 10 TABLET ORAL at 17:30

## 2022-01-01 RX ADMIN — MAGNESIUM HYDROXIDE 30 ML: 400 SUSPENSION ORAL at 05:17

## 2022-01-01 RX ADMIN — MYCOPHENOLATE MOFETIL 250 MG: 250 CAPSULE ORAL at 18:28

## 2022-01-01 RX ADMIN — MYCOPHENOLATE MOFETIL 250 MG: 250 CAPSULE ORAL at 05:35

## 2022-01-01 RX ADMIN — MYCOPHENOLATE MOFETIL 250 MG: 250 CAPSULE ORAL at 09:37

## 2022-01-01 RX ADMIN — LEVETIRACETAM 1500 MG: 500 TABLET, FILM COATED ORAL at 05:43

## 2022-01-01 RX ADMIN — LEVETIRACETAM 1500 MG: 500 TABLET, FILM COATED ORAL at 17:21

## 2022-01-01 RX ADMIN — TACROLIMUS 2 MG: 1 CAPSULE ORAL at 05:24

## 2022-01-01 RX ADMIN — TACROLIMUS 2 MG: 1 CAPSULE ORAL at 06:01

## 2022-01-01 RX ADMIN — LACOSAMIDE 100 MG: 100 TABLET, FILM COATED ORAL at 18:32

## 2022-01-01 RX ADMIN — DIPHENHYDRAMINE HYDROCHLORIDE 12.5 MG: 12.5 SOLUTION ORAL at 22:28

## 2022-01-01 RX ADMIN — PIPERACILLIN AND TAZOBACTAM 3.38 G: 3; .375 INJECTION, POWDER, LYOPHILIZED, FOR SOLUTION INTRAVENOUS; PARENTERAL at 12:17

## 2022-01-01 RX ADMIN — METOCLOPRAMIDE 10 MG: 10 TABLET ORAL at 05:33

## 2022-01-01 RX ADMIN — METOCLOPRAMIDE 10 MG: 10 TABLET ORAL at 16:27

## 2022-01-01 RX ADMIN — INSULIN HUMAN 1 UNITS: 100 INJECTION, SOLUTION PARENTERAL at 17:32

## 2022-01-01 RX ADMIN — OXYCODONE 5 MG: 5 TABLET ORAL at 17:19

## 2022-01-01 RX ADMIN — OMEPRAZOLE 40 MG: 20 CAPSULE, DELAYED RELEASE ORAL at 06:05

## 2022-01-01 RX ADMIN — POLYETHYLENE GLYCOL 3350 1 PACKET: 17 POWDER, FOR SOLUTION ORAL at 17:10

## 2022-01-01 RX ADMIN — HYDROMORPHONE HYDROCHLORIDE 0.25 MG: 1 INJECTION, SOLUTION INTRAMUSCULAR; INTRAVENOUS; SUBCUTANEOUS at 22:33

## 2022-01-01 RX ADMIN — SUGAMMADEX 200 MG: 100 INJECTION, SOLUTION INTRAVENOUS at 15:45

## 2022-01-01 RX ADMIN — MAGNESIUM CITRATE 148 ML: 1.75 LIQUID ORAL at 09:34

## 2022-01-01 RX ADMIN — LACOSAMIDE 100 MG: 100 TABLET, FILM COATED ORAL at 18:20

## 2022-01-01 RX ADMIN — LEVOTHYROXINE SODIUM 137 MCG: 0.14 TABLET ORAL at 06:28

## 2022-01-01 RX ADMIN — INSULIN HUMAN 1 UNITS: 100 INJECTION, SOLUTION PARENTERAL at 17:15

## 2022-01-01 RX ADMIN — LACOSAMIDE 100 MG: 100 TABLET, FILM COATED ORAL at 18:29

## 2022-01-01 RX ADMIN — LEVETIRACETAM 750 MG: 500 TABLET, FILM COATED ORAL at 17:06

## 2022-01-01 RX ADMIN — METOCLOPRAMIDE 10 MG: 10 TABLET ORAL at 17:04

## 2022-01-01 RX ADMIN — DOCUSATE SODIUM 50 MG AND SENNOSIDES 8.6 MG 2 TABLET: 8.6; 5 TABLET, FILM COATED ORAL at 05:43

## 2022-01-01 RX ADMIN — PRAVASTATIN SODIUM 20 MG: 20 TABLET ORAL at 10:51

## 2022-01-01 RX ADMIN — CLONAZEPAM 0.5 MG: 0.5 TABLET ORAL at 01:36

## 2022-01-01 RX ADMIN — TRANEXAMIC ACID 1000 MG: 100 INJECTION, SOLUTION INTRAVENOUS at 13:51

## 2022-01-01 RX ADMIN — TACROLIMUS 2 MG: 1 CAPSULE ORAL at 05:20

## 2022-01-01 RX ADMIN — SODIUM CHLORIDE, POTASSIUM CHLORIDE, SODIUM LACTATE AND CALCIUM CHLORIDE 674 ML: 600; 310; 30; 20 INJECTION, SOLUTION INTRAVENOUS at 17:04

## 2022-01-01 RX ADMIN — MYCOPHENOLATE MOFETIL 250 MG: 250 CAPSULE ORAL at 09:30

## 2022-01-01 RX ADMIN — LIDOCAINE 1 PATCH: 50 PATCH TOPICAL at 05:39

## 2022-01-01 RX ADMIN — LACOSAMIDE 100 MG: 50 TABLET, FILM COATED ORAL at 17:27

## 2022-01-01 RX ADMIN — ONDANSETRON 4 MG: 2 INJECTION INTRAMUSCULAR; INTRAVENOUS at 15:05

## 2022-01-01 RX ADMIN — OXYBUTYNIN CHLORIDE 10 MG: 5 TABLET, EXTENDED RELEASE ORAL at 06:38

## 2022-01-01 RX ADMIN — TACROLIMUS 2 MG: 1 CAPSULE ORAL at 16:22

## 2022-01-01 RX ADMIN — SODIUM CHLORIDE: 9 INJECTION, SOLUTION INTRAVENOUS at 01:36

## 2022-01-01 RX ADMIN — TACROLIMUS 2 MG: 5 CAPSULE ORAL at 06:06

## 2022-01-01 RX ADMIN — MORPHINE SULFATE 4 MG: 4 INJECTION INTRAVENOUS at 12:28

## 2022-01-01 RX ADMIN — GABAPENTIN 100 MG: 100 CAPSULE ORAL at 18:31

## 2022-01-01 RX ADMIN — LEVETIRACETAM 750 MG: 100 INJECTION, SOLUTION INTRAVENOUS at 06:26

## 2022-01-01 RX ADMIN — MYCOPHENOLATE MOFETIL 250 MG: 250 CAPSULE ORAL at 06:36

## 2022-01-01 RX ADMIN — TACROLIMUS 2 MG: 1 CAPSULE ORAL at 05:09

## 2022-01-01 RX ADMIN — TADALAFIL 20 MG: 20 TABLET, FILM COATED ORAL at 05:23

## 2022-01-01 RX ADMIN — DOCUSATE SODIUM 100 MG: 100 CAPSULE, LIQUID FILLED ORAL at 05:35

## 2022-01-01 RX ADMIN — OMEPRAZOLE 40 MG: 20 CAPSULE, DELAYED RELEASE ORAL at 06:18

## 2022-01-01 RX ADMIN — INSULIN HUMAN 1 UNITS: 100 INJECTION, SOLUTION PARENTERAL at 12:19

## 2022-01-01 RX ADMIN — INSULIN HUMAN 3 UNITS: 100 INJECTION, SOLUTION PARENTERAL at 20:42

## 2022-01-01 RX ADMIN — ONDANSETRON 4 MG: 2 INJECTION INTRAMUSCULAR; INTRAVENOUS at 10:45

## 2022-01-01 RX ADMIN — INSULIN HUMAN 1 UNITS: 100 INJECTION, SOLUTION PARENTERAL at 13:18

## 2022-01-01 RX ADMIN — PROPOFOL 125 MCG/KG/MIN: 10 INJECTION, EMULSION INTRAVENOUS at 09:16

## 2022-01-01 RX ADMIN — METOCLOPRAMIDE 10 MG: 10 TABLET ORAL at 18:30

## 2022-01-01 RX ADMIN — OXYCODONE HYDROCHLORIDE 10 MG: 10 TABLET ORAL at 00:46

## 2022-01-01 RX ADMIN — HYDROMORPHONE HYDROCHLORIDE 0.25 MG: 1 INJECTION, SOLUTION INTRAMUSCULAR; INTRAVENOUS; SUBCUTANEOUS at 13:07

## 2022-01-01 RX ADMIN — OXYCODONE HYDROCHLORIDE 10 MG: 10 TABLET ORAL at 13:08

## 2022-01-01 RX ADMIN — LORAZEPAM 0.5 MG: 2 INJECTION INTRAMUSCULAR; INTRAVENOUS at 17:00

## 2022-01-01 RX ADMIN — LEVETIRACETAM 750 MG: 500 TABLET, FILM COATED ORAL at 06:13

## 2022-01-01 RX ADMIN — MORPHINE SULFATE 15 MG: 15 TABLET, FILM COATED, EXTENDED RELEASE ORAL at 17:21

## 2022-01-01 RX ADMIN — SERTRALINE HYDROCHLORIDE 150 MG: 50 TABLET ORAL at 16:26

## 2022-01-01 RX ADMIN — MYCOPHENOLATE MOFETIL 250 MG: 250 CAPSULE ORAL at 23:58

## 2022-01-01 RX ADMIN — MORPHINE SULFATE 10 MG: 10 INJECTION INTRAVENOUS at 05:29

## 2022-01-01 RX ADMIN — LACOSAMIDE 100 MG: 100 TABLET, FILM COATED ORAL at 05:09

## 2022-01-01 RX ADMIN — TADALAFIL 20 MG: 20 TABLET, FILM COATED ORAL at 04:03

## 2022-01-01 RX ADMIN — OXYCODONE 5 MG: 5 TABLET ORAL at 05:27

## 2022-01-01 RX ADMIN — SERTRALINE HYDROCHLORIDE 150 MG: 50 TABLET ORAL at 17:30

## 2022-01-01 RX ADMIN — OXYCODONE 5 MG: 5 TABLET ORAL at 21:46

## 2022-01-01 RX ADMIN — CLONAZEPAM 0.5 MG: 0.5 TABLET ORAL at 18:04

## 2022-01-01 RX ADMIN — MYCOPHENOLATE MOFETIL 250 MG: 250 CAPSULE ORAL at 05:06

## 2022-01-01 RX ADMIN — MORPHINE SULFATE 10 MG: 10 INJECTION INTRAVENOUS at 13:18

## 2022-01-01 RX ADMIN — FLUDROCORTISONE ACETATE 0.1 MG: 0.1 TABLET ORAL at 05:34

## 2022-01-01 RX ADMIN — HYDROMORPHONE HYDROCHLORIDE 1 MG: 1 INJECTION, SOLUTION INTRAMUSCULAR; INTRAVENOUS; SUBCUTANEOUS at 20:55

## 2022-01-01 RX ADMIN — PRAVASTATIN SODIUM 20 MG: 20 TABLET ORAL at 06:05

## 2022-01-01 RX ADMIN — ENOXAPARIN SODIUM 40 MG: 40 INJECTION SUBCUTANEOUS at 06:59

## 2022-01-01 RX ADMIN — MORPHINE SULFATE 2 MG: 10 INJECTION INTRAVENOUS at 16:09

## 2022-01-01 RX ADMIN — OXYCODONE HYDROCHLORIDE 10 MG: 10 TABLET ORAL at 11:23

## 2022-01-01 RX ADMIN — LEVETIRACETAM 1500 MG: 500 TABLET, FILM COATED ORAL at 21:27

## 2022-01-01 RX ADMIN — OXYMETAZOLINE HCL 2 SPRAY: 0.05 SPRAY NASAL at 13:30

## 2022-01-01 RX ADMIN — OXYCODONE 5 MG: 5 TABLET ORAL at 08:44

## 2022-01-01 RX ADMIN — LACOSAMIDE 100 MG: 50 TABLET, FILM COATED ORAL at 06:18

## 2022-01-01 RX ADMIN — MULTIPLE VITAMINS W/ MINERALS TAB 1 TABLET: TAB at 04:49

## 2022-01-01 RX ADMIN — TACROLIMUS 2 MG: 1 CAPSULE ORAL at 04:58

## 2022-01-01 RX ADMIN — OMEPRAZOLE 40 MG: 20 CAPSULE, DELAYED RELEASE ORAL at 05:47

## 2022-01-01 RX ADMIN — FLUDROCORTISONE ACETATE 0.1 MG: 0.1 TABLET ORAL at 05:49

## 2022-01-01 RX ADMIN — HYDROMORPHONE HYDROCHLORIDE 0.25 MG: 1 INJECTION, SOLUTION INTRAMUSCULAR; INTRAVENOUS; SUBCUTANEOUS at 17:58

## 2022-01-01 RX ADMIN — LEVOTHYROXINE SODIUM 137 MCG: 0.14 TABLET ORAL at 06:22

## 2022-01-01 RX ADMIN — OMEPRAZOLE 40 MG: 20 CAPSULE, DELAYED RELEASE ORAL at 05:34

## 2022-01-01 RX ADMIN — LEVETIRACETAM 1500 MG: 500 TABLET, FILM COATED ORAL at 18:28

## 2022-01-01 RX ADMIN — SERTRALINE HYDROCHLORIDE 150 MG: 50 TABLET ORAL at 06:17

## 2022-01-01 RX ADMIN — HYDROMORPHONE HYDROCHLORIDE 1 MG: 1 INJECTION, SOLUTION INTRAMUSCULAR; INTRAVENOUS; SUBCUTANEOUS at 09:08

## 2022-01-01 RX ADMIN — INSULIN HUMAN 1 UNITS: 100 INJECTION, SOLUTION PARENTERAL at 06:10

## 2022-01-01 RX ADMIN — CLONAZEPAM 0.5 MG: 1 TABLET ORAL at 17:30

## 2022-01-01 RX ADMIN — HYDROMORPHONE HYDROCHLORIDE 0.5 MG: 1 INJECTION, SOLUTION INTRAMUSCULAR; INTRAVENOUS; SUBCUTANEOUS at 11:44

## 2022-01-01 RX ADMIN — SERTRALINE HYDROCHLORIDE 150 MG: 50 TABLET ORAL at 06:14

## 2022-01-01 RX ADMIN — MORPHINE SULFATE 15 MG: 15 TABLET, FILM COATED, EXTENDED RELEASE ORAL at 17:09

## 2022-01-01 RX ADMIN — METOCLOPRAMIDE 10 MG: 10 TABLET ORAL at 23:56

## 2022-01-01 RX ADMIN — TACROLIMUS 2 MG: 1 CAPSULE ORAL at 17:12

## 2022-01-01 RX ADMIN — MORPHINE SULFATE 15 MG: 15 TABLET, FILM COATED, EXTENDED RELEASE ORAL at 05:20

## 2022-01-01 RX ADMIN — MORPHINE SULFATE 10 MG: 10 INJECTION INTRAVENOUS at 00:11

## 2022-01-01 RX ADMIN — ONDANSETRON 4 MG: 4 TABLET, ORALLY DISINTEGRATING ORAL at 15:33

## 2022-01-01 RX ADMIN — TACROLIMUS 2 MG: 1 CAPSULE ORAL at 04:47

## 2022-01-01 RX ADMIN — CLONAZEPAM 0.5 MG: 0.5 TABLET ORAL at 13:30

## 2022-01-01 RX ADMIN — MIRTAZAPINE 7.5 MG: 15 TABLET, ORALLY DISINTEGRATING ORAL at 21:44

## 2022-01-01 RX ADMIN — MYCOPHENOLATE MOFETIL 250 MG: 250 CAPSULE ORAL at 06:04

## 2022-01-01 RX ADMIN — OMEPRAZOLE 40 MG: 20 CAPSULE, DELAYED RELEASE ORAL at 06:17

## 2022-01-01 RX ADMIN — DOCUSATE SODIUM 50 MG AND SENNOSIDES 8.6 MG 1 TABLET: 8.6; 5 TABLET, FILM COATED ORAL at 19:43

## 2022-01-01 RX ADMIN — ONDANSETRON 4 MG: 2 INJECTION INTRAMUSCULAR; INTRAVENOUS at 13:42

## 2022-01-01 RX ADMIN — MORPHINE SULFATE 15 MG: 15 TABLET, FILM COATED, EXTENDED RELEASE ORAL at 05:31

## 2022-01-01 RX ADMIN — LEVOTHYROXINE SODIUM 137 MCG: 137 TABLET ORAL at 05:24

## 2022-01-01 RX ADMIN — INSULIN HUMAN 1 UNITS: 100 INJECTION, SOLUTION PARENTERAL at 20:13

## 2022-01-01 RX ADMIN — OXYCODONE HYDROCHLORIDE 15 MG: 10 TABLET ORAL at 15:48

## 2022-01-01 RX ADMIN — DOCUSATE SODIUM 100 MG: 100 CAPSULE, LIQUID FILLED ORAL at 05:20

## 2022-01-01 RX ADMIN — SERTRALINE HYDROCHLORIDE 150 MG: 50 TABLET ORAL at 16:17

## 2022-01-01 RX ADMIN — ONDANSETRON 4 MG: 2 INJECTION INTRAMUSCULAR; INTRAVENOUS at 08:23

## 2022-01-01 RX ADMIN — GABAPENTIN 100 MG: 100 CAPSULE ORAL at 05:05

## 2022-01-01 RX ADMIN — VANCOMYCIN HYDROCHLORIDE 125 MG: KIT ORAL at 12:48

## 2022-01-01 RX ADMIN — METOCLOPRAMIDE 10 MG: 10 TABLET ORAL at 06:18

## 2022-01-01 RX ADMIN — LACOSAMIDE 100 MG: 100 TABLET, FILM COATED ORAL at 04:58

## 2022-01-01 RX ADMIN — LORAZEPAM 1 MG: 2 INJECTION INTRAMUSCULAR; INTRAVENOUS at 21:04

## 2022-01-01 RX ADMIN — LEVETIRACETAM 500 MG: 500 TABLET, FILM COATED ORAL at 21:07

## 2022-01-01 RX ADMIN — LACOSAMIDE 100 MG: 100 TABLET, FILM COATED ORAL at 16:18

## 2022-01-01 RX ADMIN — OMEPRAZOLE 40 MG: 20 CAPSULE, DELAYED RELEASE ORAL at 05:09

## 2022-01-01 RX ADMIN — MORPHINE SULFATE 10 MG: 10 INJECTION INTRAVENOUS at 11:12

## 2022-01-01 RX ADMIN — MULTIPLE VITAMINS W/ MINERALS TAB 1 TABLET: TAB at 05:31

## 2022-01-01 RX ADMIN — GABAPENTIN 100 MG: 100 CAPSULE ORAL at 10:42

## 2022-01-01 RX ADMIN — PRAVASTATIN SODIUM 20 MG: 20 TABLET ORAL at 05:47

## 2022-01-01 RX ADMIN — Medication 400 MG: at 04:45

## 2022-01-01 RX ADMIN — LORAZEPAM 1 MG: 2 INJECTION INTRAMUSCULAR; INTRAVENOUS at 16:18

## 2022-01-01 RX ADMIN — HYDROMORPHONE HYDROCHLORIDE 0.25 MG: 1 INJECTION, SOLUTION INTRAMUSCULAR; INTRAVENOUS; SUBCUTANEOUS at 07:52

## 2022-01-01 RX ADMIN — CLONAZEPAM 0.5 MG: 1 TABLET ORAL at 05:42

## 2022-01-01 RX ADMIN — ONDANSETRON 4 MG: 2 INJECTION INTRAMUSCULAR; INTRAVENOUS at 10:57

## 2022-01-01 RX ADMIN — MYCOPHENOLATE MOFETIL 250 MG: 250 CAPSULE ORAL at 23:00

## 2022-01-01 RX ADMIN — OMEPRAZOLE 40 MG: 20 CAPSULE, DELAYED RELEASE ORAL at 05:33

## 2022-01-01 RX ADMIN — MORPHINE SULFATE 15 MG: 15 TABLET, FILM COATED, EXTENDED RELEASE ORAL at 06:27

## 2022-01-01 RX ADMIN — OXYCODONE 5 MG: 5 TABLET ORAL at 07:40

## 2022-01-01 RX ADMIN — OXYCODONE 5 MG: 5 TABLET ORAL at 11:42

## 2022-01-01 RX ADMIN — LACOSAMIDE 100 MG: 50 TABLET, FILM COATED ORAL at 17:20

## 2022-01-01 RX ADMIN — LIDOCAINE HYDROCHLORIDE 50 MG: 20 INJECTION, SOLUTION EPIDURAL; INFILTRATION; INTRACAUDAL at 09:16

## 2022-01-01 RX ADMIN — LEVETIRACETAM 500 MG: 100 INJECTION, SOLUTION INTRAVENOUS at 08:56

## 2022-01-01 RX ADMIN — LEVOTHYROXINE SODIUM 137 MCG: 137 TABLET ORAL at 05:48

## 2022-01-01 RX ADMIN — DOCUSATE SODIUM 100 MG: 100 CAPSULE, LIQUID FILLED ORAL at 05:28

## 2022-01-01 RX ADMIN — HYDROMORPHONE HYDROCHLORIDE 0.25 MG: 1 INJECTION, SOLUTION INTRAMUSCULAR; INTRAVENOUS; SUBCUTANEOUS at 11:37

## 2022-01-01 RX ADMIN — PRAVASTATIN SODIUM 20 MG: 20 TABLET ORAL at 05:05

## 2022-01-01 RX ADMIN — HYDROMORPHONE HYDROCHLORIDE 0.5 MG: 1 INJECTION, SOLUTION INTRAMUSCULAR; INTRAVENOUS; SUBCUTANEOUS at 10:54

## 2022-01-01 RX ADMIN — OXYCODONE 5 MG: 5 TABLET ORAL at 22:34

## 2022-01-01 RX ADMIN — FLUDROCORTISONE ACETATE 0.1 MG: 0.1 TABLET ORAL at 05:25

## 2022-01-01 RX ADMIN — POTASSIUM CHLORIDE 10 MEQ: 1500 TABLET, EXTENDED RELEASE ORAL at 04:04

## 2022-01-01 RX ADMIN — DOCUSATE SODIUM 100 MG: 100 CAPSULE, LIQUID FILLED ORAL at 16:22

## 2022-01-01 RX ADMIN — LACOSAMIDE 100 MG: 100 TABLET, FILM COATED ORAL at 06:05

## 2022-01-01 RX ADMIN — OMEPRAZOLE 40 MG: 20 CAPSULE, DELAYED RELEASE ORAL at 04:56

## 2022-01-01 RX ADMIN — PRAVASTATIN SODIUM 20 MG: 20 TABLET ORAL at 05:34

## 2022-01-01 RX ADMIN — ONDANSETRON 4 MG: 2 INJECTION INTRAMUSCULAR; INTRAVENOUS at 09:37

## 2022-01-01 RX ADMIN — IOHEXOL 80 ML: 350 INJECTION, SOLUTION INTRAVENOUS at 21:45

## 2022-01-01 RX ADMIN — INSULIN HUMAN 1 UNITS: 100 INJECTION, SOLUTION PARENTERAL at 00:03

## 2022-01-01 RX ADMIN — LACOSAMIDE 100 MG: 10 INJECTION INTRAVENOUS at 05:35

## 2022-01-01 RX ADMIN — CLONAZEPAM 0.5 MG: 0.5 TABLET ORAL at 20:37

## 2022-01-01 RX ADMIN — ENOXAPARIN SODIUM 40 MG: 40 INJECTION SUBCUTANEOUS at 06:04

## 2022-01-01 RX ADMIN — LACOSAMIDE 100 MG: 100 TABLET, FILM COATED ORAL at 12:09

## 2022-01-01 RX ADMIN — HYDROMORPHONE HYDROCHLORIDE 1 MG: 1 INJECTION, SOLUTION INTRAMUSCULAR; INTRAVENOUS; SUBCUTANEOUS at 14:25

## 2022-01-01 RX ADMIN — INSULIN HUMAN 1 UNITS: 100 INJECTION, SOLUTION PARENTERAL at 12:36

## 2022-01-01 RX ADMIN — TADALAFIL 20 MG: 20 TABLET ORAL at 06:48

## 2022-01-01 RX ADMIN — SODIUM CHLORIDE: 9 INJECTION, SOLUTION INTRAVENOUS at 07:52

## 2022-01-01 RX ADMIN — MORPHINE SULFATE 10 MG: 10 INJECTION INTRAVENOUS at 22:22

## 2022-01-01 RX ADMIN — OXYCODONE 5 MG: 5 TABLET ORAL at 21:01

## 2022-01-01 RX ADMIN — MYCOPHENOLATE MOFETIL 250 MG: 250 CAPSULE ORAL at 04:53

## 2022-01-01 RX ADMIN — ENOXAPARIN SODIUM 40 MG: 40 INJECTION SUBCUTANEOUS at 17:38

## 2022-01-01 RX ADMIN — METOCLOPRAMIDE 10 MG: 10 TABLET ORAL at 10:21

## 2022-01-01 RX ADMIN — HYDROMORPHONE HYDROCHLORIDE 0.25 MG: 1 INJECTION, SOLUTION INTRAMUSCULAR; INTRAVENOUS; SUBCUTANEOUS at 21:04

## 2022-01-01 RX ADMIN — MORPHINE SULFATE 20 MG: 10 INJECTION INTRAVENOUS at 15:27

## 2022-01-01 RX ADMIN — METOCLOPRAMIDE 10 MG: 10 TABLET ORAL at 17:38

## 2022-01-01 RX ADMIN — LORAZEPAM 1 MG: 2 INJECTION INTRAMUSCULAR; INTRAVENOUS at 13:18

## 2022-01-01 RX ADMIN — MORPHINE SULFATE 15 MG: 15 TABLET, FILM COATED, EXTENDED RELEASE ORAL at 05:17

## 2022-01-01 RX ADMIN — SENNOSIDES AND DOCUSATE SODIUM 1 TABLET: 50; 8.6 TABLET ORAL at 19:51

## 2022-01-01 RX ADMIN — SENNOSIDES AND DOCUSATE SODIUM 2 TABLET: 50; 8.6 TABLET ORAL at 17:40

## 2022-01-01 RX ADMIN — HYDROMORPHONE HYDROCHLORIDE 1 MG: 1 INJECTION, SOLUTION INTRAMUSCULAR; INTRAVENOUS; SUBCUTANEOUS at 07:51

## 2022-01-01 RX ADMIN — MYCOPHENOLATE MOFETIL 250 MG: 250 CAPSULE ORAL at 18:30

## 2022-01-01 RX ADMIN — LEVETIRACETAM 750 MG: 500 TABLET, FILM COATED ORAL at 17:20

## 2022-01-01 RX ADMIN — OMEPRAZOLE 40 MG: 20 CAPSULE, DELAYED RELEASE ORAL at 04:59

## 2022-01-01 RX ADMIN — SERTRALINE HYDROCHLORIDE 150 MG: 50 TABLET ORAL at 05:19

## 2022-01-01 RX ADMIN — OXYCODONE HYDROCHLORIDE 10 MG: 10 TABLET ORAL at 05:05

## 2022-01-01 RX ADMIN — OXYCODONE HYDROCHLORIDE 10 MG: 10 TABLET ORAL at 09:40

## 2022-01-01 RX ADMIN — ENOXAPARIN SODIUM 40 MG: 40 INJECTION SUBCUTANEOUS at 12:11

## 2022-01-01 RX ADMIN — Medication 2 TABLET: at 16:26

## 2022-01-01 RX ADMIN — LEVOTHYROXINE SODIUM 137 MCG: 0.14 TABLET ORAL at 06:38

## 2022-01-01 RX ADMIN — HYDROMORPHONE HYDROCHLORIDE 0.25 MG: 1 INJECTION, SOLUTION INTRAMUSCULAR; INTRAVENOUS; SUBCUTANEOUS at 08:28

## 2022-01-01 RX ADMIN — MAGNESIUM HYDROXIDE 30 ML: 400 SUSPENSION ORAL at 05:32

## 2022-01-01 RX ADMIN — LEVETIRACETAM 1500 MG: 500 TABLET, FILM COATED ORAL at 17:43

## 2022-01-01 RX ADMIN — TACROLIMUS 2 MG: 1 CAPSULE ORAL at 17:04

## 2022-01-01 RX ADMIN — ONDANSETRON 4 MG: 2 INJECTION INTRAMUSCULAR; INTRAVENOUS at 19:45

## 2022-01-01 RX ADMIN — POTASSIUM CHLORIDE 20 MEQ: 1500 TABLET, EXTENDED RELEASE ORAL at 05:48

## 2022-01-01 RX ADMIN — MORPHINE SULFATE 4 MG: 4 INJECTION INTRAVENOUS at 21:36

## 2022-01-01 RX ADMIN — LEVOTHYROXINE SODIUM 137 MCG: 137 TABLET ORAL at 05:21

## 2022-01-01 RX ADMIN — ONDANSETRON 4 MG: 4 TABLET, ORALLY DISINTEGRATING ORAL at 02:51

## 2022-01-01 RX ADMIN — FLUDROCORTISONE ACETATE 0.1 MG: 0.1 TABLET ORAL at 04:44

## 2022-01-01 RX ADMIN — LEVOTHYROXINE SODIUM 137 MCG: 0.14 TABLET ORAL at 06:17

## 2022-01-01 RX ADMIN — TACROLIMUS 2 MG: 1 CAPSULE ORAL at 17:16

## 2022-01-01 RX ADMIN — SERTRALINE 100 MG: 50 TABLET, FILM COATED ORAL at 17:45

## 2022-01-01 RX ADMIN — FLUDROCORTISONE ACETATE 0.1 MG: 0.1 TABLET ORAL at 06:14

## 2022-01-01 RX ADMIN — LEVETIRACETAM 750 MG: 500 TABLET, FILM COATED ORAL at 17:39

## 2022-01-01 RX ADMIN — DOCUSATE SODIUM 100 MG: 100 CAPSULE, LIQUID FILLED ORAL at 18:29

## 2022-01-01 RX ADMIN — LACOSAMIDE 100 MG: 100 TABLET, FILM COATED ORAL at 23:57

## 2022-01-01 RX ADMIN — HYDROMORPHONE HYDROCHLORIDE 0.5 MG: 1 INJECTION, SOLUTION INTRAMUSCULAR; INTRAVENOUS; SUBCUTANEOUS at 05:07

## 2022-01-01 RX ADMIN — HYDROMORPHONE HYDROCHLORIDE 0.25 MG: 1 INJECTION, SOLUTION INTRAMUSCULAR; INTRAVENOUS; SUBCUTANEOUS at 03:00

## 2022-01-01 RX ADMIN — MORPHINE SULFATE 20 MG: 10 INJECTION INTRAVENOUS at 11:52

## 2022-01-01 RX ADMIN — FLUDROCORTISONE ACETATE 0.1 MG: 0.1 TABLET ORAL at 06:01

## 2022-01-01 RX ADMIN — LEVOTHYROXINE SODIUM 137 MCG: 137 TABLET ORAL at 04:45

## 2022-01-01 RX ADMIN — LEVETIRACETAM 750 MG: 500 TABLET, FILM COATED ORAL at 06:23

## 2022-01-01 RX ADMIN — MORPHINE SULFATE 4 MG: 4 INJECTION INTRAVENOUS at 01:04

## 2022-01-01 RX ADMIN — OXYCODONE HYDROCHLORIDE 10 MG: 10 TABLET ORAL at 10:57

## 2022-01-01 RX ADMIN — FLUDROCORTISONE ACETATE 0.1 MG: 0.1 TABLET ORAL at 05:35

## 2022-01-01 RX ADMIN — LACOSAMIDE 100 MG: 50 TABLET, FILM COATED ORAL at 06:00

## 2022-01-01 RX ADMIN — SODIUM CHLORIDE: 9 INJECTION, SOLUTION INTRAVENOUS at 09:37

## 2022-01-01 RX ADMIN — ONDANSETRON 4 MG: 2 INJECTION INTRAMUSCULAR; INTRAVENOUS at 08:03

## 2022-01-01 RX ADMIN — DOCUSATE SODIUM 100 MG: 100 CAPSULE, LIQUID FILLED ORAL at 17:30

## 2022-01-01 RX ADMIN — LEVETIRACETAM 750 MG: 100 INJECTION, SOLUTION INTRAVENOUS at 20:04

## 2022-01-01 RX ADMIN — SODIUM CHLORIDE 250 MG: 9 INJECTION, SOLUTION INTRAVENOUS at 05:14

## 2022-01-01 RX ADMIN — OXYCODONE 5 MG: 5 TABLET ORAL at 12:58

## 2022-01-01 RX ADMIN — LEVOTHYROXINE SODIUM 137 MCG: 137 TABLET ORAL at 05:09

## 2022-01-01 RX ADMIN — CLONAZEPAM 0.5 MG: 1 TABLET ORAL at 05:33

## 2022-01-01 RX ADMIN — POTASSIUM CHLORIDE 10 MEQ: 1500 TABLET, EXTENDED RELEASE ORAL at 05:18

## 2022-01-01 RX ADMIN — Medication 400 MG: at 06:05

## 2022-01-01 RX ADMIN — FLUDROCORTISONE ACETATE 0.1 MG: 0.1 TABLET ORAL at 06:23

## 2022-01-01 RX ADMIN — MORPHINE SULFATE 6 MG: 10 INJECTION INTRAVENOUS at 19:36

## 2022-01-01 RX ADMIN — LACOSAMIDE 100 MG: 50 TABLET, FILM COATED ORAL at 17:22

## 2022-01-01 RX ADMIN — OMEPRAZOLE 40 MG: 20 CAPSULE, DELAYED RELEASE ORAL at 05:31

## 2022-01-01 RX ADMIN — CEFTRIAXONE SODIUM 1 G: 1 INJECTION, POWDER, FOR SOLUTION INTRAMUSCULAR; INTRAVENOUS at 19:29

## 2022-01-01 RX ADMIN — DOCUSATE SODIUM 100 MG: 100 CAPSULE, LIQUID FILLED ORAL at 05:49

## 2022-01-01 RX ADMIN — IOHEXOL 80 ML: 350 INJECTION, SOLUTION INTRAVENOUS at 04:15

## 2022-01-01 RX ADMIN — LACOSAMIDE 100 MG: 50 TABLET, FILM COATED ORAL at 05:35

## 2022-01-01 RX ADMIN — FUROSEMIDE 20 MG: 20 TABLET ORAL at 05:27

## 2022-01-01 RX ADMIN — LACOSAMIDE 100 MG: 100 TABLET, FILM COATED ORAL at 16:22

## 2022-01-01 RX ADMIN — POLYETHYLENE GLYCOL 3350 1 PACKET: 17 POWDER, FOR SOLUTION ORAL at 05:50

## 2022-01-01 RX ADMIN — BUPIVACAINE HYDROCHLORIDE AND EPINEPHRINE 30 ML: 2.5; 5 INJECTION, SOLUTION EPIDURAL; INFILTRATION; INTRACAUDAL; PERINEURAL at 13:10

## 2022-01-01 RX ADMIN — SENNOSIDES AND DOCUSATE SODIUM 2 TABLET: 50; 8.6 TABLET ORAL at 06:02

## 2022-01-01 RX ADMIN — AMITRIPTYLINE HYDROCHLORIDE 25 MG: 25 TABLET, FILM COATED ORAL at 20:42

## 2022-01-01 RX ADMIN — LORAZEPAM 1 MG: 2 INJECTION INTRAMUSCULAR; INTRAVENOUS at 23:22

## 2022-01-01 RX ADMIN — TADALAFIL 20 MG: 20 TABLET, FILM COATED ORAL at 11:00

## 2022-01-01 RX ADMIN — Medication 1 TABLET: at 18:20

## 2022-01-01 RX ADMIN — FLUDROCORTISONE ACETATE 0.1 MG: 0.1 TABLET ORAL at 04:59

## 2022-01-01 RX ADMIN — OXYCODONE 5 MG: 5 TABLET ORAL at 06:02

## 2022-01-01 RX ADMIN — MAGNESIUM HYDROXIDE 30 ML: 400 SUSPENSION ORAL at 05:33

## 2022-01-01 RX ADMIN — CLONAZEPAM 0.5 MG: 0.5 TABLET ORAL at 13:17

## 2022-01-01 RX ADMIN — FLUDROCORTISONE ACETATE 0.1 MG: 0.1 TABLET ORAL at 12:10

## 2022-01-01 RX ADMIN — OMEPRAZOLE 40 MG: 20 CAPSULE, DELAYED RELEASE ORAL at 05:21

## 2022-01-01 RX ADMIN — LEVETIRACETAM 1500 MG: 500 TABLET, FILM COATED ORAL at 06:03

## 2022-01-01 RX ADMIN — PRAVASTATIN SODIUM 20 MG: 20 TABLET ORAL at 04:43

## 2022-01-01 RX ADMIN — SODIUM CHLORIDE 1000 ML: 9 INJECTION, SOLUTION INTRAVENOUS at 20:58

## 2022-01-01 RX ADMIN — MORPHINE SULFATE 10 MG: 10 INJECTION INTRAVENOUS at 17:20

## 2022-01-01 RX ADMIN — SODIUM CHLORIDE: 9 INJECTION, SOLUTION INTRAVENOUS at 04:29

## 2022-01-01 RX ADMIN — HYDROMORPHONE HYDROCHLORIDE 0.25 MG: 1 INJECTION, SOLUTION INTRAMUSCULAR; INTRAVENOUS; SUBCUTANEOUS at 23:45

## 2022-01-01 RX ADMIN — LACOSAMIDE 100 MG: 50 TABLET, FILM COATED ORAL at 06:36

## 2022-01-01 RX ADMIN — DOCUSATE SODIUM 100 MG: 100 CAPSULE, LIQUID FILLED ORAL at 05:24

## 2022-01-01 RX ADMIN — OXYCODONE 5 MG: 5 TABLET ORAL at 21:58

## 2022-01-01 RX ADMIN — TACROLIMUS 2 MG: 1 CAPSULE ORAL at 17:25

## 2022-01-01 RX ADMIN — LEVETIRACETAM 750 MG: 500 TABLET, FILM COATED ORAL at 17:45

## 2022-01-01 RX ADMIN — CLONAZEPAM 0.5 MG: 1 TABLET ORAL at 11:09

## 2022-01-01 RX ADMIN — OXYCODONE HYDROCHLORIDE 10 MG: 10 TABLET, FILM COATED, EXTENDED RELEASE ORAL at 01:19

## 2022-01-01 RX ADMIN — DOCUSATE SODIUM 100 MG: 100 CAPSULE, LIQUID FILLED ORAL at 18:32

## 2022-01-01 RX ADMIN — LEVOTHYROXINE SODIUM 137 MCG: 0.14 TABLET ORAL at 05:49

## 2022-01-01 RX ADMIN — AMBRISENTAN 10 MG: 10 TABLET, FILM COATED ORAL at 14:00

## 2022-01-01 RX ADMIN — LEVETIRACETAM 750 MG: 500 TABLET, FILM COATED ORAL at 06:37

## 2022-01-01 RX ADMIN — MORPHINE SULFATE 2 MG: 10 INJECTION INTRAVENOUS at 16:25

## 2022-01-01 RX ADMIN — Medication 1 TABLET: at 17:18

## 2022-01-01 RX ADMIN — OXYCODONE HYDROCHLORIDE 10 MG: 10 TABLET ORAL at 11:33

## 2022-01-01 RX ADMIN — SERTRALINE HYDROCHLORIDE 150 MG: 50 TABLET ORAL at 06:03

## 2022-01-01 RX ADMIN — HYDROMORPHONE HYDROCHLORIDE 0.5 MG: 1 INJECTION, SOLUTION INTRAMUSCULAR; INTRAVENOUS; SUBCUTANEOUS at 21:48

## 2022-01-01 RX ADMIN — TACROLIMUS 2 MG: 1 CAPSULE ORAL at 17:30

## 2022-01-01 RX ADMIN — TACROLIMUS 4 MG: 1 CAPSULE ORAL at 05:18

## 2022-01-01 RX ADMIN — TACROLIMUS 2 MG: 1 CAPSULE ORAL at 05:28

## 2022-01-01 RX ADMIN — LACOSAMIDE 100 MG: 100 TABLET, FILM COATED ORAL at 05:38

## 2022-01-01 RX ADMIN — LEVETIRACETAM 1500 MG: 500 TABLET, FILM COATED ORAL at 12:10

## 2022-01-01 RX ADMIN — TRAZODONE HYDROCHLORIDE 50 MG: 50 TABLET ORAL at 20:57

## 2022-01-01 RX ADMIN — AMBRISENTAN 10 MG: 10 TABLET, FILM COATED ORAL at 11:00

## 2022-01-01 RX ADMIN — HYDROMORPHONE HYDROCHLORIDE 1 MG: 1 INJECTION, SOLUTION INTRAMUSCULAR; INTRAVENOUS; SUBCUTANEOUS at 18:12

## 2022-01-01 RX ADMIN — DOCUSATE SODIUM 100 MG: 100 CAPSULE, LIQUID FILLED ORAL at 17:15

## 2022-01-01 RX ADMIN — MORPHINE SULFATE 10 MG: 10 INJECTION INTRAVENOUS at 01:26

## 2022-01-01 RX ADMIN — MORPHINE SULFATE 15 MG: 15 TABLET, FILM COATED, EXTENDED RELEASE ORAL at 17:29

## 2022-01-01 RX ADMIN — AMITRIPTYLINE HYDROCHLORIDE 25 MG: 25 TABLET, FILM COATED ORAL at 20:54

## 2022-01-01 RX ADMIN — CLONAZEPAM 0.5 MG: 0.5 TABLET ORAL at 06:27

## 2022-01-01 RX ADMIN — BISACODYL 10 MG: 10 SUPPOSITORY RECTAL at 09:37

## 2022-01-01 RX ADMIN — MULTIPLE VITAMINS W/ MINERALS TAB 1 TABLET: TAB at 05:20

## 2022-01-01 RX ADMIN — TRAZODONE HYDROCHLORIDE 50 MG: 50 TABLET ORAL at 01:03

## 2022-01-01 RX ADMIN — MYCOPHENOLATE MOFETIL 250 MG: 250 CAPSULE ORAL at 06:07

## 2022-01-01 RX ADMIN — ONDANSETRON 4 MG: 2 INJECTION INTRAMUSCULAR; INTRAVENOUS at 09:15

## 2022-01-01 RX ADMIN — OXYCODONE HYDROCHLORIDE 15 MG: 10 TABLET ORAL at 02:25

## 2022-01-01 RX ADMIN — TACROLIMUS 2 MG: 1 CAPSULE ORAL at 10:52

## 2022-01-01 RX ADMIN — HYDROMORPHONE HYDROCHLORIDE 0.25 MG: 1 INJECTION, SOLUTION INTRAMUSCULAR; INTRAVENOUS; SUBCUTANEOUS at 22:39

## 2022-01-01 RX ADMIN — MYCOPHENOLATE MOFETIL 250 MG: 250 CAPSULE ORAL at 21:51

## 2022-01-01 RX ADMIN — TADALAFIL 20 MG: 20 TABLET, FILM COATED ORAL at 09:53

## 2022-01-01 RX ADMIN — OXYCODONE 5 MG: 5 TABLET ORAL at 15:02

## 2022-01-01 RX ADMIN — HYDROMORPHONE HYDROCHLORIDE 0.25 MG: 1 INJECTION, SOLUTION INTRAMUSCULAR; INTRAVENOUS; SUBCUTANEOUS at 14:05

## 2022-01-01 RX ADMIN — LEVOTHYROXINE SODIUM 137 MCG: 137 TABLET ORAL at 05:30

## 2022-01-01 RX ADMIN — DOCUSATE SODIUM 100 MG: 100 CAPSULE, LIQUID FILLED ORAL at 05:32

## 2022-01-01 RX ADMIN — LEVETIRACETAM 750 MG: 500 TABLET, FILM COATED ORAL at 05:48

## 2022-01-01 RX ADMIN — SERTRALINE HYDROCHLORIDE 150 MG: 50 TABLET ORAL at 17:07

## 2022-01-01 RX ADMIN — FLUDROCORTISONE ACETATE 0.1 MG: 0.1 TABLET ORAL at 05:33

## 2022-01-01 RX ADMIN — SERTRALINE 150 MG: 100 TABLET, FILM COATED ORAL at 05:28

## 2022-01-01 RX ADMIN — DESMOPRESSIN ACETATE 21.56 MCG: 4 SOLUTION INTRAVENOUS at 09:32

## 2022-01-01 RX ADMIN — FENTANYL CITRATE 50 MCG: 50 INJECTION, SOLUTION INTRAMUSCULAR; INTRAVENOUS at 13:46

## 2022-01-01 RX ADMIN — METOCLOPRAMIDE 10 MG: 10 TABLET ORAL at 17:44

## 2022-01-01 RX ADMIN — LEVETIRACETAM 1500 MG: 500 TABLET, FILM COATED ORAL at 04:59

## 2022-01-01 RX ADMIN — TACROLIMUS 2 MG: 1 CAPSULE ORAL at 18:19

## 2022-01-01 RX ADMIN — LACOSAMIDE 100 MG: 10 INJECTION INTRAVENOUS at 20:11

## 2022-01-01 RX ADMIN — LEVOTHYROXINE SODIUM 137 MCG: 137 TABLET ORAL at 05:05

## 2022-01-01 RX ADMIN — LEVETIRACETAM 750 MG: 500 TABLET, FILM COATED ORAL at 06:03

## 2022-01-01 RX ADMIN — LACOSAMIDE 100 MG: 50 TABLET, FILM COATED ORAL at 05:17

## 2022-01-01 RX ADMIN — PRAVASTATIN SODIUM 20 MG: 20 TABLET ORAL at 05:48

## 2022-01-01 RX ADMIN — LORAZEPAM 0.5 MG: 0.5 TABLET ORAL at 07:29

## 2022-01-01 RX ADMIN — Medication 2 TABLET: at 17:09

## 2022-01-01 RX ADMIN — POLYETHYLENE GLYCOL 3350 1 PACKET: 17 POWDER, FOR SOLUTION ORAL at 16:22

## 2022-01-01 RX ADMIN — MORPHINE SULFATE 15 MG: 15 TABLET, FILM COATED, EXTENDED RELEASE ORAL at 05:36

## 2022-01-01 RX ADMIN — MYCOPHENOLATE MOFETIL 250 MG: 250 CAPSULE ORAL at 05:36

## 2022-01-01 RX ADMIN — HYDROCODONE BITARTRATE AND ACETAMINOPHEN 1 TABLET: 5; 325 TABLET ORAL at 22:17

## 2022-01-01 RX ADMIN — ONDANSETRON 4 MG: 2 INJECTION INTRAMUSCULAR; INTRAVENOUS at 15:00

## 2022-01-01 RX ADMIN — OXYCODONE HYDROCHLORIDE 15 MG: 10 TABLET ORAL at 15:42

## 2022-01-01 RX ADMIN — MORPHINE SULFATE 15 MG: 15 TABLET, EXTENDED RELEASE ORAL at 12:21

## 2022-01-01 RX ADMIN — MORPHINE SULFATE 10 MG: 10 INJECTION INTRAVENOUS at 16:17

## 2022-01-01 RX ADMIN — CLONAZEPAM 0.5 MG: 0.5 TABLET ORAL at 08:16

## 2022-01-01 RX ADMIN — LORAZEPAM 1 MG: 2 INJECTION INTRAMUSCULAR; INTRAVENOUS at 19:38

## 2022-01-01 RX ADMIN — DOCUSATE SODIUM 100 MG: 100 CAPSULE, LIQUID FILLED ORAL at 17:10

## 2022-01-01 RX ADMIN — LEVETIRACETAM 750 MG: 500 TABLET, FILM COATED ORAL at 05:49

## 2022-01-01 RX ADMIN — OXYCODONE HYDROCHLORIDE 15 MG: 10 TABLET ORAL at 19:55

## 2022-01-01 RX ADMIN — OXYCODONE HYDROCHLORIDE 15 MG: 10 TABLET ORAL at 06:22

## 2022-01-01 RX ADMIN — PROMETHAZINE HYDROCHLORIDE 25 MG: 25 TABLET ORAL at 16:06

## 2022-01-01 RX ADMIN — LEVOTHYROXINE SODIUM 137 MCG: 137 TABLET ORAL at 05:49

## 2022-01-01 RX ADMIN — LEVETIRACETAM 1500 MG: 500 TABLET, FILM COATED ORAL at 05:05

## 2022-01-01 RX ADMIN — HYDROMORPHONE HYDROCHLORIDE 0.5 MG: 1 INJECTION, SOLUTION INTRAMUSCULAR; INTRAVENOUS; SUBCUTANEOUS at 20:36

## 2022-01-01 RX ADMIN — MORPHINE SULFATE 15 MG: 15 TABLET, FILM COATED, EXTENDED RELEASE ORAL at 17:07

## 2022-01-01 RX ADMIN — HYDROMORPHONE HYDROCHLORIDE 0.25 MG: 1 INJECTION, SOLUTION INTRAMUSCULAR; INTRAVENOUS; SUBCUTANEOUS at 04:05

## 2022-01-01 RX ADMIN — PRAVASTATIN SODIUM 20 MG: 20 TABLET ORAL at 17:07

## 2022-01-01 RX ADMIN — SENNOSIDES AND DOCUSATE SODIUM 2 TABLET: 50; 8.6 TABLET ORAL at 06:23

## 2022-01-01 RX ADMIN — PROPOFOL 100 MG: 10 INJECTION, EMULSION INTRAVENOUS at 13:26

## 2022-01-01 RX ADMIN — LEVOTHYROXINE SODIUM 137 MCG: 137 TABLET ORAL at 06:09

## 2022-01-01 RX ADMIN — LEVETIRACETAM 750 MG: 100 INJECTION, SOLUTION INTRAVENOUS at 05:35

## 2022-01-01 RX ADMIN — MAGNESIUM HYDROXIDE 30 ML: 400 SUSPENSION ORAL at 17:14

## 2022-01-01 RX ADMIN — MYCOPHENOLATE MOFETIL 250 MG: 250 CAPSULE ORAL at 17:32

## 2022-01-01 RX ADMIN — OXYCODONE HYDROCHLORIDE 15 MG: 10 TABLET ORAL at 21:42

## 2022-01-01 RX ADMIN — HYDROMORPHONE HYDROCHLORIDE 0.5 MG: 1 INJECTION, SOLUTION INTRAMUSCULAR; INTRAVENOUS; SUBCUTANEOUS at 23:45

## 2022-01-01 RX ADMIN — GABAPENTIN 100 MG: 100 CAPSULE ORAL at 06:18

## 2022-01-01 RX ADMIN — OXYCODONE HYDROCHLORIDE 10 MG: 10 TABLET ORAL at 14:15

## 2022-01-01 RX ADMIN — Medication 1 TABLET: at 16:53

## 2022-01-01 RX ADMIN — OXYCODONE HYDROCHLORIDE 10 MG: 10 TABLET ORAL at 05:31

## 2022-01-01 RX ADMIN — LACOSAMIDE 100 MG: 100 TABLET, FILM COATED ORAL at 06:18

## 2022-01-01 RX ADMIN — OXYCODONE HYDROCHLORIDE 10 MG: 10 TABLET ORAL at 06:25

## 2022-01-01 RX ADMIN — MYCOPHENOLATE MOFETIL 250 MG: 250 CAPSULE ORAL at 04:47

## 2022-01-01 RX ADMIN — LACOSAMIDE 100 MG: 50 TABLET, FILM COATED ORAL at 06:14

## 2022-01-01 RX ADMIN — OXYCODONE 5 MG: 5 TABLET ORAL at 06:20

## 2022-01-01 RX ADMIN — MORPHINE SULFATE 4 MG: 4 INJECTION INTRAVENOUS at 14:59

## 2022-01-01 RX ADMIN — GADOTERIDOL 13 ML: 279.3 INJECTION, SOLUTION INTRAVENOUS at 17:48

## 2022-01-01 RX ADMIN — MYCOPHENOLATE MOFETIL 250 MG: 250 CAPSULE ORAL at 16:27

## 2022-01-01 RX ADMIN — MYCOPHENOLATE MOFETIL 250 MG: 250 CAPSULE ORAL at 05:08

## 2022-01-01 RX ADMIN — OXYCODONE HYDROCHLORIDE 10 MG: 10 TABLET ORAL at 20:26

## 2022-01-01 RX ADMIN — LACOSAMIDE 100 MG: 100 TABLET, FILM COATED ORAL at 18:00

## 2022-01-01 RX ADMIN — OXYCODONE HYDROCHLORIDE 10 MG: 10 TABLET, FILM COATED, EXTENDED RELEASE ORAL at 18:29

## 2022-01-01 RX ADMIN — OXYBUTYNIN CHLORIDE 10 MG: 5 TABLET, EXTENDED RELEASE ORAL at 05:18

## 2022-01-01 RX ADMIN — LACOSAMIDE 100 MG: 100 TABLET, FILM COATED ORAL at 04:03

## 2022-01-01 RX ADMIN — OXYCODONE 5 MG: 5 TABLET ORAL at 07:16

## 2022-01-01 RX ADMIN — DOCUSATE SODIUM 100 MG: 100 CAPSULE, LIQUID FILLED ORAL at 18:00

## 2022-01-01 RX ADMIN — OXYCODONE 5 MG: 5 TABLET ORAL at 11:27

## 2022-01-01 RX ADMIN — POTASSIUM CHLORIDE 10 MEQ: 1500 TABLET, EXTENDED RELEASE ORAL at 05:29

## 2022-01-01 RX ADMIN — MORPHINE SULFATE 4 MG: 4 INJECTION INTRAVENOUS at 09:36

## 2022-01-01 RX ADMIN — LEVOTHYROXINE SODIUM 137 MCG: 0.14 TABLET ORAL at 05:33

## 2022-01-01 RX ADMIN — ONDANSETRON 4 MG: 2 INJECTION INTRAMUSCULAR; INTRAVENOUS at 23:25

## 2022-01-01 RX ADMIN — OXYCODONE HYDROCHLORIDE 10 MG: 10 TABLET ORAL at 20:27

## 2022-01-01 RX ADMIN — Medication 2 TABLET: at 16:21

## 2022-01-01 RX ADMIN — DOCUSATE SODIUM 100 MG: 100 CAPSULE, LIQUID FILLED ORAL at 04:44

## 2022-01-01 RX ADMIN — LEVOTHYROXINE SODIUM 137 MCG: 137 TABLET ORAL at 06:18

## 2022-01-01 RX ADMIN — POLYETHYLENE GLYCOL 3350 1 PACKET: 17 POWDER, FOR SOLUTION ORAL at 18:32

## 2022-01-01 RX ADMIN — HYDROMORPHONE HYDROCHLORIDE 0.25 MG: 1 INJECTION, SOLUTION INTRAMUSCULAR; INTRAVENOUS; SUBCUTANEOUS at 07:15

## 2022-01-01 RX ADMIN — CLONAZEPAM 0.5 MG: 0.5 TABLET ORAL at 21:42

## 2022-01-01 RX ADMIN — OXYCODONE 5 MG: 5 TABLET ORAL at 14:37

## 2022-01-01 RX ADMIN — HYDROMORPHONE HYDROCHLORIDE 0.5 MG: 1 INJECTION, SOLUTION INTRAMUSCULAR; INTRAVENOUS; SUBCUTANEOUS at 03:00

## 2022-01-01 RX ADMIN — DOCUSATE SODIUM 50 MG AND SENNOSIDES 8.6 MG 2 TABLET: 8.6; 5 TABLET, FILM COATED ORAL at 05:08

## 2022-01-01 RX ADMIN — Medication 400 MG: at 12:11

## 2022-01-01 RX ADMIN — DOCUSATE SODIUM 50 MG AND SENNOSIDES 8.6 MG 1 TABLET: 8.6; 5 TABLET, FILM COATED ORAL at 19:28

## 2022-01-01 RX ADMIN — POLYETHYLENE GLYCOL 3350 1 PACKET: 17 POWDER, FOR SOLUTION ORAL at 05:39

## 2022-01-01 RX ADMIN — OXYCODONE HYDROCHLORIDE 15 MG: 10 TABLET ORAL at 20:02

## 2022-01-01 RX ADMIN — PRAVASTATIN SODIUM 20 MG: 20 TABLET ORAL at 04:53

## 2022-01-01 RX ADMIN — SODIUM CHLORIDE: 3 INJECTION, SOLUTION INTRAVENOUS at 07:57

## 2022-01-01 RX ADMIN — SODIUM CHLORIDE: 9 INJECTION, SOLUTION INTRAVENOUS at 19:24

## 2022-01-01 RX ADMIN — Medication 400 MG: at 11:17

## 2022-01-01 RX ADMIN — FLUDROCORTISONE ACETATE 0.1 MG: 0.1 TABLET ORAL at 05:21

## 2022-01-01 RX ADMIN — FAMOTIDINE 20 MG: 10 INJECTION, SOLUTION INTRAVENOUS at 08:56

## 2022-01-01 RX ADMIN — ONDANSETRON 4 MG: 2 INJECTION INTRAMUSCULAR; INTRAVENOUS at 13:15

## 2022-01-01 RX ADMIN — METHYLNALTREXONE BROMIDE 8 MG: 8 INJECTION, SOLUTION SUBCUTANEOUS at 11:47

## 2022-01-01 RX ADMIN — MORPHINE SULFATE 15 MG: 15 TABLET, FILM COATED, EXTENDED RELEASE ORAL at 04:46

## 2022-01-01 RX ADMIN — SERTRALINE HYDROCHLORIDE 100 MG: 100 TABLET ORAL at 05:44

## 2022-01-01 RX ADMIN — LEVETIRACETAM 750 MG: 500 TABLET, FILM COATED ORAL at 04:53

## 2022-01-01 RX ADMIN — OXYCODONE HYDROCHLORIDE AND ACETAMINOPHEN 500 MG: 500 TABLET ORAL at 05:50

## 2022-01-01 RX ADMIN — CLONAZEPAM 0.5 MG: 0.5 TABLET ORAL at 06:23

## 2022-01-01 RX ADMIN — SENNOSIDES AND DOCUSATE SODIUM 2 TABLET: 50; 8.6 TABLET ORAL at 05:51

## 2022-01-01 RX ADMIN — SODIUM CHLORIDE, POTASSIUM CHLORIDE, SODIUM LACTATE AND CALCIUM CHLORIDE: 600; 310; 30; 20 INJECTION, SOLUTION INTRAVENOUS at 11:02

## 2022-01-01 RX ADMIN — OXYCODONE HYDROCHLORIDE 10 MG: 10 TABLET ORAL at 19:28

## 2022-01-01 RX ADMIN — INSULIN HUMAN 2 UNITS: 100 INJECTION, SOLUTION PARENTERAL at 13:14

## 2022-01-01 RX ADMIN — CLONAZEPAM 0.5 MG: 0.5 TABLET ORAL at 08:44

## 2022-01-01 RX ADMIN — MIDAZOLAM HYDROCHLORIDE 2 MG: 1 INJECTION, SOLUTION INTRAMUSCULAR; INTRAVENOUS at 15:37

## 2022-01-01 RX ADMIN — MORPHINE SULFATE 15 MG: 15 TABLET, FILM COATED, EXTENDED RELEASE ORAL at 17:15

## 2022-01-01 RX ADMIN — LEVETIRACETAM 1500 MG: 500 TABLET, FILM COATED ORAL at 04:03

## 2022-01-01 RX ADMIN — LACOSAMIDE 100 MG: 100 TABLET, FILM COATED ORAL at 17:14

## 2022-01-01 RX ADMIN — LACOSAMIDE 100 MG: 50 TABLET, FILM COATED ORAL at 05:50

## 2022-01-01 RX ADMIN — LACOSAMIDE 100 MG: 100 TABLET, FILM COATED ORAL at 06:02

## 2022-01-01 RX ADMIN — MYCOPHENOLATE MOFETIL 250 MG: 250 CAPSULE ORAL at 17:38

## 2022-01-01 RX ADMIN — METOCLOPRAMIDE 10 MG: 10 TABLET ORAL at 18:07

## 2022-01-01 RX ADMIN — MYCOPHENOLATE MOFETIL 250 MG: 250 CAPSULE ORAL at 21:35

## 2022-01-01 RX ADMIN — OXYCODONE 5 MG: 5 TABLET ORAL at 18:59

## 2022-01-01 RX ADMIN — LEVETIRACETAM 750 MG: 500 TABLET, FILM COATED ORAL at 05:19

## 2022-01-01 RX ADMIN — OMEPRAZOLE 40 MG: 20 CAPSULE, DELAYED RELEASE ORAL at 06:22

## 2022-01-01 RX ADMIN — SENNOSIDES AND DOCUSATE SODIUM 2 TABLET: 50; 8.6 TABLET ORAL at 17:21

## 2022-01-01 RX ADMIN — POLYETHYLENE GLYCOL 3350 1 PACKET: 17 POWDER, FOR SOLUTION ORAL at 10:23

## 2022-01-01 RX ADMIN — MYCOPHENOLATE MOFETIL 250 MG: 250 CAPSULE ORAL at 05:52

## 2022-01-01 RX ADMIN — AMBRISENTAN 10 MG: 10 TABLET, FILM COATED ORAL at 05:43

## 2022-01-01 RX ADMIN — LIDOCAINE 1 PATCH: 50 PATCH TOPICAL at 06:24

## 2022-01-01 RX ADMIN — LORAZEPAM 4 MG: 2 INJECTION INTRAMUSCULAR; INTRAVENOUS at 16:50

## 2022-01-01 RX ADMIN — PRAVASTATIN SODIUM 20 MG: 20 TABLET ORAL at 17:20

## 2022-01-01 RX ADMIN — Medication 400 MG: at 05:08

## 2022-01-01 RX ADMIN — LACOSAMIDE 100 MG: 50 TABLET, FILM COATED ORAL at 17:40

## 2022-01-01 RX ADMIN — PRAVASTATIN SODIUM 20 MG: 20 TABLET ORAL at 06:03

## 2022-01-01 RX ADMIN — CLONAZEPAM 0.5 MG: 0.5 TABLET ORAL at 06:01

## 2022-01-01 RX ADMIN — PRAVASTATIN SODIUM 20 MG: 20 TABLET ORAL at 05:09

## 2022-01-01 RX ADMIN — SENNOSIDES AND DOCUSATE SODIUM 2 TABLET: 50; 8.6 TABLET ORAL at 06:18

## 2022-01-01 RX ADMIN — LORAZEPAM 1 MG: 2 INJECTION INTRAMUSCULAR; INTRAVENOUS at 02:02

## 2022-01-01 RX ADMIN — HYDROMORPHONE HYDROCHLORIDE 0.5 MG: 1 INJECTION, SOLUTION INTRAMUSCULAR; INTRAVENOUS; SUBCUTANEOUS at 12:28

## 2022-01-01 RX ADMIN — TACROLIMUS 2 MG: 1 CAPSULE ORAL at 16:27

## 2022-01-01 RX ADMIN — FLUDROCORTISONE ACETATE 0.1 MG: 0.1 TABLET ORAL at 05:28

## 2022-01-01 RX ADMIN — OXYCODONE HYDROCHLORIDE 10 MG: 10 TABLET ORAL at 16:54

## 2022-01-01 RX ADMIN — LACOSAMIDE 100 MG: 50 TABLET, FILM COATED ORAL at 17:05

## 2022-01-01 RX ADMIN — LEVETIRACETAM 1500 MG: 500 TABLET, FILM COATED ORAL at 06:05

## 2022-01-01 RX ADMIN — FUROSEMIDE 20 MG: 20 TABLET ORAL at 06:01

## 2022-01-01 RX ADMIN — ONDANSETRON 4 MG: 2 INJECTION INTRAMUSCULAR; INTRAVENOUS at 10:09

## 2022-01-01 RX ADMIN — LACOSAMIDE 100 MG: 100 TABLET, FILM COATED ORAL at 06:03

## 2022-01-01 RX ADMIN — PRAVASTATIN SODIUM 20 MG: 20 TABLET ORAL at 06:22

## 2022-01-01 RX ADMIN — FLUDROCORTISONE ACETATE 0.1 MG: 0.1 TABLET ORAL at 06:17

## 2022-01-01 RX ADMIN — CLONAZEPAM 0.5 MG: 0.5 TABLET ORAL at 21:19

## 2022-01-01 RX ADMIN — METOCLOPRAMIDE 10 MG: 10 TABLET ORAL at 04:46

## 2022-01-01 RX ADMIN — TACROLIMUS 2 MG: 1 CAPSULE ORAL at 06:18

## 2022-01-01 RX ADMIN — OXYBUTYNIN CHLORIDE 10 MG: 5 TABLET, EXTENDED RELEASE ORAL at 05:37

## 2022-01-01 RX ADMIN — FLUDROCORTISONE ACETATE 0.1 MG: 0.1 TABLET ORAL at 06:28

## 2022-01-01 RX ADMIN — FLUDROCORTISONE ACETATE 0.1 MG: 0.1 TABLET ORAL at 05:09

## 2022-01-01 RX ADMIN — OXYCODONE HYDROCHLORIDE 15 MG: 10 TABLET ORAL at 13:18

## 2022-01-01 RX ADMIN — PRAVASTATIN SODIUM 20 MG: 20 TABLET ORAL at 06:00

## 2022-01-01 RX ADMIN — DOCUSATE SODIUM 100 MG: 100 CAPSULE, LIQUID FILLED ORAL at 17:44

## 2022-01-01 RX ADMIN — SERTRALINE 150 MG: 100 TABLET, FILM COATED ORAL at 04:59

## 2022-01-01 RX ADMIN — PRAVASTATIN SODIUM 20 MG: 20 TABLET ORAL at 06:14

## 2022-01-01 RX ADMIN — MAGNESIUM SULFATE HEPTAHYDRATE 2 G: 40 INJECTION, SOLUTION INTRAVENOUS at 09:41

## 2022-01-01 RX ADMIN — MYCOPHENOLATE MOFETIL 250 MG: 250 CAPSULE ORAL at 18:43

## 2022-01-01 RX ADMIN — TACROLIMUS 2 MG: 1 CAPSULE ORAL at 05:51

## 2022-01-01 RX ADMIN — LEVOTHYROXINE SODIUM 137 MCG: 0.14 TABLET ORAL at 06:04

## 2022-01-01 RX ADMIN — OMEPRAZOLE 40 MG: 20 CAPSULE, DELAYED RELEASE ORAL at 06:14

## 2022-01-01 RX ADMIN — SENNOSIDES AND DOCUSATE SODIUM 2 TABLET: 50; 8.6 TABLET ORAL at 06:14

## 2022-01-01 RX ADMIN — OXYCODONE HYDROCHLORIDE 10 MG: 10 TABLET ORAL at 01:56

## 2022-01-01 RX ADMIN — PIPERACILLIN AND TAZOBACTAM 3.38 G: 3; .375 INJECTION, POWDER, LYOPHILIZED, FOR SOLUTION INTRAVENOUS; PARENTERAL at 20:20

## 2022-01-01 RX ADMIN — POTASSIUM CHLORIDE 10 MEQ: 7.46 INJECTION, SOLUTION INTRAVENOUS at 17:03

## 2022-01-01 RX ADMIN — ONDANSETRON 4 MG: 2 INJECTION INTRAMUSCULAR; INTRAVENOUS at 19:00

## 2022-01-01 RX ADMIN — SODIUM CHLORIDE 250 MG: 9 INJECTION, SOLUTION INTRAVENOUS at 18:07

## 2022-01-01 RX ADMIN — LEVOTHYROXINE SODIUM 137 MCG: 0.14 TABLET ORAL at 05:35

## 2022-01-01 RX ADMIN — HYDROMORPHONE HYDROCHLORIDE 0.5 MG: 1 INJECTION, SOLUTION INTRAMUSCULAR; INTRAVENOUS; SUBCUTANEOUS at 05:46

## 2022-01-01 RX ADMIN — MORPHINE SULFATE 20 MG: 10 INJECTION INTRAVENOUS at 10:31

## 2022-01-01 RX ADMIN — LORAZEPAM 4 MG: 2 INJECTION INTRAMUSCULAR; INTRAVENOUS at 11:01

## 2022-01-01 RX ADMIN — TACROLIMUS 2 MG: 1 CAPSULE ORAL at 23:57

## 2022-01-01 RX ADMIN — LEVETIRACETAM 1500 MG: 500 TABLET, FILM COATED ORAL at 05:31

## 2022-01-01 RX ADMIN — CLONAZEPAM 0.5 MG: 0.5 TABLET ORAL at 10:04

## 2022-01-01 RX ADMIN — MYCOPHENOLATE MOFETIL 250 MG: 250 CAPSULE ORAL at 16:23

## 2022-01-01 RX ADMIN — OXYCODONE 5 MG: 5 TABLET ORAL at 03:46

## 2022-01-01 RX ADMIN — TACROLIMUS 2 MG: 1 CAPSULE ORAL at 17:40

## 2022-01-01 RX ADMIN — OXYCODONE HYDROCHLORIDE 15 MG: 10 TABLET ORAL at 00:10

## 2022-01-01 RX ADMIN — OXYCODONE HYDROCHLORIDE 10 MG: 10 TABLET ORAL at 16:20

## 2022-01-01 RX ADMIN — OXYCODONE 5 MG: 5 TABLET ORAL at 01:26

## 2022-01-01 RX ADMIN — TACROLIMUS 2 MG: 1 CAPSULE ORAL at 05:32

## 2022-01-01 RX ADMIN — OXYCODONE 5 MG: 5 TABLET ORAL at 11:00

## 2022-01-01 RX ADMIN — OXYCODONE HYDROCHLORIDE 10 MG: 10 TABLET ORAL at 16:22

## 2022-01-01 RX ADMIN — ENOXAPARIN SODIUM 40 MG: 40 INJECTION SUBCUTANEOUS at 06:18

## 2022-01-01 RX ADMIN — PRAVASTATIN SODIUM 20 MG: 20 TABLET ORAL at 05:50

## 2022-01-01 RX ADMIN — TACROLIMUS 2 MG: 1 CAPSULE ORAL at 18:43

## 2022-01-01 RX ADMIN — MAGNESIUM HYDROXIDE 30 ML: 400 SUSPENSION ORAL at 05:20

## 2022-01-01 RX ADMIN — OXYCODONE HYDROCHLORIDE 10 MG: 10 TABLET ORAL at 21:04

## 2022-01-01 RX ADMIN — OXYCODONE 5 MG: 5 TABLET ORAL at 21:35

## 2022-01-01 RX ADMIN — LEVETIRACETAM 1500 MG: 500 TABLET, FILM COATED ORAL at 17:04

## 2022-01-01 RX ADMIN — CLONAZEPAM 0.5 MG: 0.5 TABLET ORAL at 05:21

## 2022-01-01 RX ADMIN — LEVOTHYROXINE SODIUM 137 MCG: 0.14 TABLET ORAL at 05:19

## 2022-01-01 RX ADMIN — MYCOPHENOLATE MOFETIL 250 MG: 250 CAPSULE ORAL at 17:41

## 2022-01-01 RX ADMIN — ONDANSETRON 4 MG: 4 TABLET, ORALLY DISINTEGRATING ORAL at 13:22

## 2022-01-01 RX ADMIN — METOCLOPRAMIDE 5 MG: 10 TABLET ORAL at 17:26

## 2022-01-01 RX ADMIN — SERTRALINE HYDROCHLORIDE 150 MG: 50 TABLET ORAL at 05:36

## 2022-01-01 RX ADMIN — METOCLOPRAMIDE 10 MG: 10 TABLET ORAL at 04:53

## 2022-01-01 RX ADMIN — KETOROLAC TROMETHAMINE 15 MG: 30 INJECTION, SOLUTION INTRAMUSCULAR at 08:33

## 2022-01-01 RX ADMIN — LACOSAMIDE 100 MG: 100 TABLET, FILM COATED ORAL at 05:47

## 2022-01-01 RX ADMIN — OXYCODONE HYDROCHLORIDE 10 MG: 10 TABLET ORAL at 16:04

## 2022-01-01 RX ADMIN — OXYCODONE 5 MG: 5 TABLET ORAL at 18:10

## 2022-01-01 RX ADMIN — HYDROMORPHONE HYDROCHLORIDE 1 MG: 1 INJECTION, SOLUTION INTRAMUSCULAR; INTRAVENOUS; SUBCUTANEOUS at 13:13

## 2022-01-01 RX ADMIN — CLONAZEPAM 0.5 MG: 0.5 TABLET ORAL at 13:49

## 2022-01-01 RX ADMIN — SODIUM CHLORIDE, POTASSIUM CHLORIDE, SODIUM LACTATE AND CALCIUM CHLORIDE: 600; 310; 30; 20 INJECTION, SOLUTION INTRAVENOUS at 09:11

## 2022-01-01 RX ADMIN — GABAPENTIN 100 MG: 100 CAPSULE ORAL at 12:10

## 2022-01-01 RX ADMIN — TACROLIMUS 2 MG: 1 CAPSULE ORAL at 18:32

## 2022-01-01 RX ADMIN — METRONIDAZOLE 500 MG: 500 TABLET ORAL at 21:14

## 2022-01-01 RX ADMIN — OXYCODONE HYDROCHLORIDE 15 MG: 10 TABLET ORAL at 15:39

## 2022-01-01 RX ADMIN — INSULIN HUMAN 1 UNITS: 100 INJECTION, SOLUTION PARENTERAL at 12:06

## 2022-01-01 RX ADMIN — MORPHINE SULFATE 10 MG: 10 INJECTION INTRAVENOUS at 14:51

## 2022-01-01 RX ADMIN — METOCLOPRAMIDE 10 MG: 10 TABLET ORAL at 05:28

## 2022-01-01 RX ADMIN — NALOXONE HYDROCHLORIDE 0.4 MG: 0.4 INJECTION, SOLUTION INTRAMUSCULAR; INTRAVENOUS; SUBCUTANEOUS at 01:24

## 2022-01-01 RX ADMIN — OXYCODONE HYDROCHLORIDE 10 MG: 10 TABLET ORAL at 21:08

## 2022-01-01 RX ADMIN — METRONIDAZOLE 500 MG: 500 TABLET ORAL at 14:08

## 2022-01-01 RX ADMIN — OXYCODONE HYDROCHLORIDE 10 MG: 10 TABLET, FILM COATED, EXTENDED RELEASE ORAL at 07:46

## 2022-01-01 RX ADMIN — SERTRALINE HYDROCHLORIDE 150 MG: 50 TABLET ORAL at 16:33

## 2022-01-01 RX ADMIN — SENNOSIDES AND DOCUSATE SODIUM 1 TABLET: 50; 8.6 TABLET ORAL at 13:04

## 2022-01-01 RX ADMIN — CALCIUM CARBONATE 500 MG: 500 TABLET, CHEWABLE ORAL at 03:00

## 2022-01-01 RX ADMIN — OXYCODONE HYDROCHLORIDE 10 MG: 10 TABLET ORAL at 11:17

## 2022-01-01 RX ADMIN — LACOSAMIDE 100 MG: 100 TABLET, FILM COATED ORAL at 05:26

## 2022-01-01 RX ADMIN — MYCOPHENOLATE MOFETIL 250 MG: 250 CAPSULE ORAL at 23:08

## 2022-01-01 RX ADMIN — CLONAZEPAM 0.5 MG: 0.5 TABLET ORAL at 15:39

## 2022-01-01 RX ADMIN — LEVETIRACETAM 750 MG: 500 TABLET, FILM COATED ORAL at 16:38

## 2022-01-01 RX ADMIN — LEVETIRACETAM 1500 MG: 500 TABLET, FILM COATED ORAL at 05:28

## 2022-01-01 RX ADMIN — METOCLOPRAMIDE 10 MG: 10 TABLET ORAL at 06:03

## 2022-01-01 RX ADMIN — DOCUSATE SODIUM 100 MG: 100 CAPSULE, LIQUID FILLED ORAL at 05:31

## 2022-01-01 RX ADMIN — METOCLOPRAMIDE 10 MG: 10 TABLET ORAL at 18:32

## 2022-01-01 RX ADMIN — SODIUM CHLORIDE, POTASSIUM CHLORIDE, SODIUM LACTATE AND CALCIUM CHLORIDE 1000 ML: 600; 310; 30; 20 INJECTION, SOLUTION INTRAVENOUS at 19:00

## 2022-01-01 RX ADMIN — HYDROMORPHONE HYDROCHLORIDE 0.25 MG: 1 INJECTION, SOLUTION INTRAMUSCULAR; INTRAVENOUS; SUBCUTANEOUS at 10:46

## 2022-01-01 RX ADMIN — SENNOSIDES AND DOCUSATE SODIUM 1 TABLET: 50; 8.6 TABLET ORAL at 21:35

## 2022-01-01 RX ADMIN — LACOSAMIDE 100 MG: 100 TABLET, FILM COATED ORAL at 05:33

## 2022-01-01 RX ADMIN — MYCOPHENOLATE MOFETIL 250 MG: 250 CAPSULE ORAL at 17:06

## 2022-01-01 RX ADMIN — SODIUM CHLORIDE: 9 INJECTION, SOLUTION INTRAVENOUS at 21:13

## 2022-01-01 RX ADMIN — MYCOPHENOLATE MOFETIL 250 MG: 250 CAPSULE ORAL at 06:06

## 2022-01-01 RX ADMIN — MULTIPLE VITAMINS W/ MINERALS TAB 1 TABLET: TAB at 05:49

## 2022-01-01 RX ADMIN — LACOSAMIDE 100 MG: 100 TABLET, FILM COATED ORAL at 17:44

## 2022-01-01 RX ADMIN — LIDOCAINE 1 PATCH: 50 PATCH TOPICAL at 06:18

## 2022-01-01 RX ADMIN — FLUDROCORTISONE ACETATE 0.1 MG: 0.1 TABLET ORAL at 06:03

## 2022-01-01 RX ADMIN — MYCOPHENOLATE MOFETIL 250 MG: 250 CAPSULE ORAL at 05:31

## 2022-01-01 RX ADMIN — LEVETIRACETAM 750 MG: 100 INJECTION, SOLUTION INTRAVENOUS at 17:29

## 2022-01-01 RX ADMIN — OMEPRAZOLE 40 MG: 20 CAPSULE, DELAYED RELEASE ORAL at 10:51

## 2022-01-01 RX ADMIN — OXYCODONE HYDROCHLORIDE 10 MG: 10 TABLET ORAL at 15:46

## 2022-01-01 RX ADMIN — OXYCODONE 5 MG: 5 TABLET ORAL at 11:13

## 2022-01-01 RX ADMIN — DIPHENHYDRAMINE HYDROCHLORIDE 12.5 MG: 12.5 SOLUTION ORAL at 10:23

## 2022-01-01 RX ADMIN — OXYCODONE 5 MG: 5 TABLET ORAL at 11:49

## 2022-01-01 RX ADMIN — PRAVASTATIN SODIUM 20 MG: 20 TABLET ORAL at 17:28

## 2022-01-01 RX ADMIN — MORPHINE SULFATE 10 MG: 10 INJECTION INTRAVENOUS at 18:51

## 2022-01-01 RX ADMIN — MYCOPHENOLATE MOFETIL 250 MG: 250 CAPSULE ORAL at 07:59

## 2022-01-01 RX ADMIN — LACOSAMIDE 100 MG: 50 TABLET, FILM COATED ORAL at 17:13

## 2022-01-01 RX ADMIN — MAGNESIUM HYDROXIDE 30 ML: 400 SUSPENSION ORAL at 05:27

## 2022-01-01 RX ADMIN — OXYCODONE HYDROCHLORIDE 10 MG: 10 TABLET ORAL at 13:16

## 2022-01-01 RX ADMIN — HYDROMORPHONE HYDROCHLORIDE 0.25 MG: 1 INJECTION, SOLUTION INTRAMUSCULAR; INTRAVENOUS; SUBCUTANEOUS at 15:21

## 2022-01-01 RX ADMIN — LEVETIRACETAM 750 MG: 500 TABLET, FILM COATED ORAL at 17:13

## 2022-01-01 RX ADMIN — MORPHINE SULFATE 10 MG: 10 INJECTION INTRAVENOUS at 04:00

## 2022-01-01 RX ADMIN — TRAZODONE HYDROCHLORIDE 50 MG: 50 TABLET ORAL at 21:27

## 2022-01-01 RX ADMIN — TACROLIMUS 2 MG: 1 CAPSULE ORAL at 05:50

## 2022-01-01 RX ADMIN — MORPHINE SULFATE 15 MG: 15 TABLET, FILM COATED, EXTENDED RELEASE ORAL at 23:57

## 2022-01-01 RX ADMIN — LACOSAMIDE 100 MG: 10 INJECTION INTRAVENOUS at 06:27

## 2022-01-01 RX ADMIN — TACROLIMUS 2 MG: 1 CAPSULE ORAL at 18:28

## 2022-01-01 RX ADMIN — OXYCODONE HYDROCHLORIDE 10 MG: 10 TABLET ORAL at 04:00

## 2022-01-01 RX ADMIN — OXYCODONE HYDROCHLORIDE 15 MG: 10 TABLET ORAL at 01:11

## 2022-01-01 RX ADMIN — SILVER NITRATE 1 APPLICATOR: 38.21; 12.74 STICK TOPICAL at 13:30

## 2022-01-01 RX ADMIN — ONDANSETRON 4 MG: 4 TABLET, ORALLY DISINTEGRATING ORAL at 17:36

## 2022-01-01 RX ADMIN — GABAPENTIN 100 MG: 100 CAPSULE ORAL at 05:09

## 2022-01-01 RX ADMIN — MORPHINE SULFATE 15 MG: 15 TABLET, FILM COATED, EXTENDED RELEASE ORAL at 17:30

## 2022-01-01 RX ADMIN — OXYCODONE HYDROCHLORIDE 15 MG: 10 TABLET ORAL at 17:37

## 2022-01-01 RX ADMIN — SODIUM CHLORIDE 250 MG: 9 INJECTION, SOLUTION INTRAVENOUS at 12:14

## 2022-01-01 RX ADMIN — GABAPENTIN 100 MG: 100 CAPSULE ORAL at 18:21

## 2022-01-01 RX ADMIN — ONDANSETRON 4 MG: 4 TABLET, ORALLY DISINTEGRATING ORAL at 14:33

## 2022-01-01 RX ADMIN — SODIUM CHLORIDE 1000 ML: 9 INJECTION, SOLUTION INTRAVENOUS at 16:04

## 2022-01-01 RX ADMIN — METOCLOPRAMIDE 5 MG: 10 TABLET ORAL at 05:50

## 2022-01-01 RX ADMIN — PIPERACILLIN AND TAZOBACTAM 3.38 G: 3; .375 INJECTION, POWDER, LYOPHILIZED, FOR SOLUTION INTRAVENOUS; PARENTERAL at 21:47

## 2022-01-01 RX ADMIN — CLONAZEPAM 0.5 MG: 0.5 TABLET ORAL at 07:49

## 2022-01-01 RX ADMIN — DOCUSATE SODIUM 100 MG: 100 CAPSULE, LIQUID FILLED ORAL at 16:27

## 2022-01-01 RX ADMIN — LEVETIRACETAM 750 MG: 500 TABLET, FILM COATED ORAL at 05:20

## 2022-01-01 RX ADMIN — FLUDROCORTISONE ACETATE 0.1 MG: 0.1 TABLET ORAL at 04:04

## 2022-01-01 RX ADMIN — ONDANSETRON 4 MG: 4 TABLET, ORALLY DISINTEGRATING ORAL at 02:00

## 2022-01-01 RX ADMIN — TACROLIMUS 2 MG: 1 CAPSULE ORAL at 06:04

## 2022-01-01 SDOH — ECONOMIC STABILITY: HOUSING INSECURITY: EVIDENCE OF SMOKING MATERIAL: 0

## 2022-01-01 SDOH — ECONOMIC STABILITY: HOUSING INSECURITY
HOME SAFETY: OXYGEN SAFETY RISK ASSESSMENT PERFORMED. PATIENT DOES HAVE A NO SMOKING SIGN POSTED IN THE HOME. PATIENT   DOES HAVE A WORKING FIRE EXTINGUISHER PRESENT IN THE HOME. SMOKE ALARMS ARE PRESENT AND FUNCTIONAL ON EACH LEVEL OF THE HOME. PATIENT DOES HAVE

## 2022-01-01 SDOH — ECONOMIC STABILITY: HOUSING INSECURITY
HOME SAFETY: A FIRE ESCAPE PLAN DEVELOPED. PATIENT DOES NOT HAVE FLAMMABLE MATERIALS PRESENT IN THE HOME PRESENTING A FIRE HAZARD. NO EVIDENCE FOUND OF SMOKING MATERIALS PRESENT IN THE HOME.

## 2022-01-01 ASSESSMENT — ENCOUNTER SYMPTOMS
DIAPHORESIS: 0
FEVER: 0
RESPIRATORY NEGATIVE: 1
BRUISES/BLEEDS EASILY: 0
GASTROINTESTINAL NEGATIVE: 1
WHEEZING: 0
ORTHOPNEA: 0
POLYDIPSIA: 0
NEUROLOGICAL NEGATIVE: 1
BLURRED VISION: 0
COUGH: 0
BRUISES/BLEEDS EASILY: 0
ABDOMINAL PAIN: 1
WEIGHT LOSS: 1
HEADACHES: 0
SENSORY CHANGE: 0
GASTROINTESTINAL NEGATIVE: 1
FEVER: 0
SHORTNESS OF BREATH: 0
DIZZINESS: 0
FOCAL WEAKNESS: 0
TINGLING: 0
DIARRHEA: 1
SINUS PAIN: 0
EYES NEGATIVE: 1
NEUROLOGICAL NEGATIVE: 1
HEARTBURN: 0
EYE PAIN: 0
ABDOMINAL PAIN: 1
RESPIRATORY NEGATIVE: 1
VOMITING: 1
FEVER: 0
EYES NEGATIVE: 1
SHORTNESS OF BREATH: 0
FOCAL WEAKNESS: 0
NAUSEA: 0
DELUSIONS: 0
DIZZINESS: 0
SORE THROAT: 0
FALLS: 1
NECK PAIN: 0
MYALGIAS: 1
FEVER: 0
SUBJECTIVE PAIN PROGRESSION: UNCHANGED
CONSTITUTIONAL NEGATIVE: 1
MYALGIAS: 1
PAIN LOCATION - PAIN SEVERITY: 9/10
HEMOPTYSIS: 0
WEIGHT LOSS: 1
CHILLS: 0
BLOOD IN STOOL: 0
WEIGHT LOSS: 0
LOSS OF CONSCIOUSNESS: 0
DIZZINESS: 0
CLAUDICATION: 0
NAUSEA: 1
HEADACHES: 1
COUGH: 0
HEADACHES: 0
DIZZINESS: 0
CARDIOVASCULAR NEGATIVE: 1
COUGH: 0
NAUSEA: 0
DEPRESSION: 0
CHILLS: 0
SHORTNESS OF BREATH: T
HEADACHES: 0
NECK PAIN: 0
VOMITING: 0
FALLS: 1
ABDOMINAL PAIN: 0
TREMORS: 0
VOMITING: 0
BACK PAIN: 0
FALLS: 1
GASTROINTESTINAL NEGATIVE: 1
HEMOPTYSIS: 0
HEADACHES: 0
CARDIOVASCULAR NEGATIVE: 1
VOMITING: 0
DIZZINESS: 0
PAIN LOCATION - PAIN QUALITY: ACHE
DIZZINESS: 0
FEVER: 0
FEVER: 0
DIARRHEA: 1
DIAPHORESIS: 0
VOMITING: 0
DEPRESSION: 0
SORE THROAT: 0
NECK PAIN: 0
PND: 0
COUGH: 0
DOUBLE VISION: 0
FEVER: 0
ROS GI COMMENTS: BM PTA
SENSORY CHANGE: 0
COUGH: 0
MUSCULOSKELETAL NEGATIVE: 1
CLAUDICATION: 0
RESPIRATORY NEGATIVE: 1
DIAPHORESIS: 0
DIZZINESS: 0
LOSS OF CONSCIOUSNESS: 0
SPUTUM PRODUCTION: 0
SHORTNESS OF BREATH: 0
FALLS: 1
NAUSEA: 0
COUGH: 0
BLURRED VISION: 0
FEVER: 0
SHORTNESS OF BREATH: 0
THOUGHT CONTENT - SHAME: 0
BRUISES/BLEEDS EASILY: 0
SEIZURES: 0
NECK PAIN: 0
SPEECH CHANGE: 0
NECK PAIN: 0
SHORTNESS OF BREATH: 0
ORTHOPNEA: 0
PARANOIA: 0
CONSTITUTIONAL NEGATIVE: 1
COUGH: 0
NERVOUS/ANXIOUS: 0
CONSTITUTIONAL NEGATIVE: 1
DECREASED APPETITE: 0
FOCAL WEAKNESS: 0
CARDIOVASCULAR NEGATIVE: 1
MYALGIAS: 0
WEIGHT LOSS: 0
ABDOMINAL PAIN: 0
HEADACHES: 1
NAUSEA: 0
NECK PAIN: 1
NAUSEA: 1
BRUISES/BLEEDS EASILY: 0
PAIN PRESENCE EVALUATION: UNABLE TO ASSESS
STRIDOR: 0
DIAPHORESIS: 0
ABDOMINAL PAIN: 0
ABDOMINAL PAIN: 1
DEPRESSION: 0
MYALGIAS: 0
ABDOMINAL PAIN: 1
PALPITATIONS: 0
CHILLS: 0
DIZZINESS: 0
WEIGHT LOSS: 1
FEVER: 0
ABDOMINAL PAIN: 0
DIZZINESS: 0
WEAKNESS: 1
BRUISES/BLEEDS EASILY: 0
ABDOMINAL PAIN: 0
EYES NEGATIVE: 1
DIARRHEA: 1
EYES NEGATIVE: 1
WEAKNESS: 0
NAUSEA: 0
DIARRHEA: 1
DIZZINESS: 0
CHILLS: 0
FEVER: 0
WEIGHT LOSS: 0
NEUROLOGICAL NEGATIVE: 1
VOMITING: 0
ABDOMINAL PAIN: 0
FOCAL WEAKNESS: 0
STRIDOR: 0
NECK PAIN: 1
SHORTNESS OF BREATH: 0
GASTROINTESTINAL NEGATIVE: 1
WEIGHT LOSS: 0
COUGH: 0
MEMORY LOSS: 1
ABDOMINAL PAIN: 1
HEADACHES: 0
SENSORY CHANGE: 0
DEPRESSION: 0
CONSTIPATION: 1
SPUTUM PRODUCTION: 0
PALPITATIONS: 0
MYALGIAS: 0
GASTROINTESTINAL NEGATIVE: 1
BLURRED VISION: 0
NEUROLOGICAL NEGATIVE: 1
VOMITING: 0
RESPIRATORY NEGATIVE: 1
PALPITATIONS: 0
WHEEZING: 0
NAUSEA: 1
CHILLS: 0
EYES NEGATIVE: 1
SORE THROAT: 0
CHILLS: 0
SPUTUM PRODUCTION: 0
NEUROLOGICAL NEGATIVE: 1
CHILLS: 0
EYE DISCHARGE: 0
PALPITATIONS: 0
WEIGHT LOSS: 0
ABDOMINAL PAIN: 1
RESPIRATORY NEGATIVE: 1
TREMORS: 0
GASTROINTESTINAL NEGATIVE: 1
BLOOD IN STOOL: 0
NAUSEA: 0
PND: 0
SHORTNESS OF BREATH: 0
CARDIOVASCULAR NEGATIVE: 1
SEIZURES: 0
CHILLS: 0
DEPRESSION: 0
COUGH: 0
BLOOD IN STOOL: 0
RESPIRATORY NEGATIVE: 1
SHORTNESS OF BREATH: 0
BRUISES/BLEEDS EASILY: 0
DOUBLE VISION: 0
PALPITATIONS: 0
BRUISES/BLEEDS EASILY: 0
GASTROINTESTINAL NEGATIVE: 1
HEADACHES: 1
NAUSEA: 0
SHORTNESS OF BREATH: 0
CONSTIPATION: 0
NERVOUS/ANXIOUS: 0
NECK PAIN: 0
SEIZURES: 0
CONSTIPATION: 1
FLANK PAIN: 0
MUSCULOSKELETAL NEGATIVE: 1
CONSTITUTIONAL NEGATIVE: 1
GASTROINTESTINAL NEGATIVE: 1
CONSTITUTIONAL NEGATIVE: 1
VOMITING: 1
HEMOPTYSIS: 0
SHORTNESS OF BREATH: 0
SHORTNESS OF BREATH: 0
NECK PAIN: 0
FALLS: 1
CONSTITUTIONAL NEGATIVE: 1
DIARRHEA: 1
FALLS: 1
PERSON REPORTING PAIN: PATIENT
MEMORY LOSS: 1
NAUSEA: 0
BLURRED VISION: 0
FEVER: 0
CHILLS: 0
ABDOMINAL PAIN: 1
CONSTITUTIONAL NEGATIVE: 1
DEPRESSION: 0
PND: 0
NAUSEA: 1
RESPIRATORY NEGATIVE: 1
SORE THROAT: 0
SORE THROAT: 0
NECK PAIN: 0
PSYCHIATRIC NEGATIVE: 1
MEMORY LOSS: 1
VOMITING: 0
EYES NEGATIVE: 1
WHEEZING: 0
MYALGIAS: 0
PSYCHIATRIC NEGATIVE: 1
STRIDOR: 0
HEMOPTYSIS: 0
SORE THROAT: 0
BLOOD IN STOOL: 0
TINGLING: 0
WHEEZING: 0
FLANK PAIN: 0
VOMITING: 1
HEADACHES: 0
FALLS: 0
SHORTNESS OF BREATH: 0
INCREASED ENERGY: 0
STRIDOR: 0
FEVER: 0
NAUSEA: 0
FEVER: 0
CHILLS: 0
WEAKNESS: 0
CONSTITUTIONAL NEGATIVE: 1
BLOOD IN STOOL: 0
HEARTBURN: 1
DOUBLE VISION: 0
MYALGIAS: 1
HEADACHES: 0
PALPITATIONS: 0
WEIGHT LOSS: 1
COUGH: 0
FLANK PAIN: 0
STRIDOR: 0
DIZZINESS: 0
FLANK PAIN: 0
WEAKNESS: 0
SPEECH CHANGE: 0
DEPRESSED MOOD: 1
EYES NEGATIVE: 1
NECK PAIN: 1
ABDOMINAL PAIN: 1
COUGH: 0
DECREASED ENERGY: 1
VOMITING: DENIES
EYES NEGATIVE: 1
SPEECH CHANGE: 0
FALLS: 1
CARDIOVASCULAR NEGATIVE: 1
TREMORS: 1
DIZZINESS: 0
DEPRESSION: 0
VOMITING: 0
BRUISES/BLEEDS EASILY: 0
ORTHOPNEA: 0
ABDOMINAL PAIN: 0
MYALGIAS: 0
ABDOMINAL PAIN: 0
PSYCHIATRIC NEGATIVE: 1
DEPRESSION: 0
NECK PAIN: 0
CHILLS: 0
ABDOMINAL PAIN: 1
DOUBLE VISION: 0
HEARTBURN: 0
HALLUCINATIONS: 0
BLOOD IN STOOL: 0
MYALGIAS: 0
NEUROLOGICAL NEGATIVE: 1
DIZZINESS: 0
BLURRED VISION: 0
PALPITATIONS: 0
SPEECH CHANGE: 0
NAUSEA: 0
DIZZINESS: 0
CLAUDICATION: 0
EYE DISCHARGE: 0
BLURRED VISION: 0
NAUSEA: 1
CHILLS: 0
DEPRESSION: 0
DEPRESSION: 0
GASTROINTESTINAL NEGATIVE: 1
WEIGHT LOSS: 1
SORE THROAT: 0
CONSTIPATION: 0
FOCAL WEAKNESS: 0
COUGH: 0
NAUSEA: 1
CARDIOVASCULAR NEGATIVE: 1
CHILLS: 0
FALLS: 1
FOCAL WEAKNESS: 0
ORTHOPNEA: 0
RESPIRATORY NEGATIVE: 1
PAIN LOCATION - PAIN DURATION: DAILY
EYES NEGATIVE: 1
WHEEZING: 0
VOMITING: 0
BACK PAIN: 1
FALLS: 1
HEADACHES: 1
HOPELESSNESS: 0
DIZZINESS: 0
SORE THROAT: 0
MEMORY LOSS: 1
SHORTNESS OF BREATH: 0
FOCAL WEAKNESS: 0
BLURRED VISION: 0
BLURRED VISION: 0
CLAUDICATION: 0
EYES NEGATIVE: 1
FOCAL WEAKNESS: 0
PALPITATIONS: 0
PSYCHIATRIC NEGATIVE: 1
ORTHOPNEA: 0
CARDIOVASCULAR NEGATIVE: 1
TINGLING: 0
WEAKNESS: 0
SEIZURES: 0
CARDIOVASCULAR NEGATIVE: 1
HEARTBURN: 1
ABDOMINAL PAIN: 0
DIARRHEA: 1
PALPITATIONS: 0
SPEECH CHANGE: 0
RESPIRATORY NEGATIVE: 1
EYES NEGATIVE: 1
FALLS: 0
FOCAL WEAKNESS: 0
SENSORY CHANGE: 0
PREOCCUPATION: 1
NEUROLOGICAL NEGATIVE: 1
FALLS: 1
NERVOUS/ANXIOUS: 0
MYALGIAS: 0
HEADACHES: 0
SPUTUM PRODUCTION: 0
DOUBLE VISION: 0
NERVOUS/ANXIOUS: 0
DOUBLE VISION: 0
PAIN LOCATION - PAIN FREQUENCY: CONSTANT
NAUSEA: 0
CHILLS: 0
FEVER: 0
CARDIOVASCULAR NEGATIVE: 1
WEAKNESS: 0
NAUSEA: 1
NERVOUS/ANXIOUS: 0
BACK PAIN: 1
COUGH: 0
COUGH: 0
SORE THROAT: 0
BLURRED VISION: 0
NAUSEA: 0
SHORTNESS OF BREATH: 0
BRUISES/BLEEDS EASILY: 0
BRUISES/BLEEDS EASILY: 0
DEPRESSION: 0
HALLUCINATIONS: 0
CARDIOVASCULAR NEGATIVE: 1
PALPITATIONS: 0
FEVER: 0
BLURRED VISION: 0
LOSS OF CONSCIOUSNESS: 0
SPEECH CHANGE: 0
VOMITING: 0
MUSCULOSKELETAL NEGATIVE: 1
WEAKNESS: 0
MYALGIAS: 0
VOMITING: 0
WEAKNESS: 0
CHILLS: 0
MUSCULOSKELETAL NEGATIVE: 1
MUSCULOSKELETAL NEGATIVE: 1
PAIN LOCATION - RELIEVING FACTORS: REST
EYE REDNESS: 0
SHORTNESS OF BREATH: 0
RESPIRATORY NEGATIVE: 1
MYALGIAS: 0
NERVOUS/ANXIOUS: 1
SHORTNESS OF BREATH: 0
WHEEZING: 0
NECK PAIN: 0
BACK PAIN: 0
PHOTOPHOBIA: 0
FEVER: 0
TREMORS: 0
LOWEST PAIN SEVERITY IN PAST 24 HOURS: 5/10
LOSS OF CONSCIOUSNESS: 0
DEBILITATING PAIN: 1
BACK PAIN: 0
DIFFICULTY FALLING ASLEEP: 0
CHILLS: 0
DIAPHORESIS: 0
FEVER: 0
STRIDOR: 0
FEVER: 0
FOCAL WEAKNESS: 0
CONSTIPATION: 0
DOUBLE VISION: 0
WEIGHT LOSS: 0
SORE THROAT: 0
MYALGIAS: 0
ABDOMINAL PAIN: 0
VOMITING: 0
PND: 0
MYALGIAS: 0
MYALGIAS: 1
EYES NEGATIVE: 1
BACK PAIN: 1
PAIN: 1
DIARRHEA: 1
NEUROLOGICAL NEGATIVE: 1
EYES NEGATIVE: 1
FEVER: 0
BRUISES/BLEEDS EASILY: 0
CHILLS: 0
DIARRHEA: 0
HEADACHES: 0
ABDOMINAL PAIN: 1
PHOTOPHOBIA: 0
COUGH: 0
PALPITATIONS: 0
EYE REDNESS: 0
HEADACHES: 1
SENSORY CHANGE: 0
PALPITATIONS: 0
ARTHRALGIAS: 1
TINGLING: 0
MEMORY LOSS: 1
HEMOPTYSIS: 0
SPUTUM PRODUCTION: 0
BRUISES/BLEEDS EASILY: 0
DIAPHORESIS: 0
DIARRHEA: 0
CARDIOVASCULAR NEGATIVE: 1
VOMITING: 1
FALLS: 1
BLOOD IN STOOL: 0
NAUSEA: 0
SORE THROAT: 0
VOMITING: 0
PAIN LOCATION - PAIN DURATION: DAILY
DIAPHORESIS: 0
MEMORY LOSS: 1
HEARTBURN: 0
FALLS: 0
HIGHEST PAIN SEVERITY IN PAST 24 HOURS: 9/10
PSYCHIATRIC NEGATIVE: 1
WHEEZING: 0
HEMOPTYSIS: 0
BLURRED VISION: 0
HEADACHES: 0
WEAKNESS: 1
RESPIRATORY NEGATIVE: 1
PAIN LOCATION - PAIN FREQUENCY: CONSTANT
MUSCULOSKELETAL NEGATIVE: 1
BLURRED VISION: 0
DIAPHORESIS: 0
PSYCHIATRIC NEGATIVE: 1
PAIN LOCATION - PAIN SEVERITY: 8/10
RESPIRATORY NEGATIVE: 1
COUGH: 0
CHILLS: 0
WEAKNESS: 1
DIZZINESS: 1
CONSTITUTIONAL NEGATIVE: 1
VOMITING: 0
DOUBLE VISION: 0
RESPIRATORY NEGATIVE: 1
DIZZINESS: 0
NAUSEA: 1
VOMITING: 0
SPEECH CHANGE: 0
RESPIRATORY NEGATIVE: 1
DEPRESSION: 0
FOCAL WEAKNESS: 0
FEVER: 0
CHILLS: 0
WEAKNESS: 0
VOMITING: 1
HEADACHES: 0
FEVER: 0
PAIN LOCATION: ABDOMEN
CARDIOVASCULAR NEGATIVE: 1
PALPITATIONS: 0
COUGH: 0
FOCAL WEAKNESS: 0
FEVER: 0
FALLS: 1
CARDIOVASCULAR NEGATIVE: 1
PSYCHIATRIC NEGATIVE: 1
BLURRED VISION: 0
COUGH: 0
PAIN LOCATION: HEAD
WEAKNESS: 0
FOCAL WEAKNESS: 1
MYALGIAS: 0
WEAKNESS: 1
DIZZINESS: 0
BRUISES/BLEEDS EASILY: 0
WEAKNESS: 1
HEMOPTYSIS: 0
NERVOUS/ANXIOUS: 0
PAIN SEVERITY GOAL: 0/10
ABDOMINAL PAIN: 1
COMPULSIONS: 0
NEUROLOGICAL NEGATIVE: 1
HEADACHES: 0
NEUROLOGICAL NEGATIVE: 1
HEADACHES: 0
SHORTNESS OF BREATH: 0
SPEECH CHANGE: 0
NAUSEA: DENIES
ABDOMINAL PAIN: 0
CARDIOVASCULAR NEGATIVE: 1
NECK PAIN: 0
FOCAL WEAKNESS: 0
ABDOMINAL PAIN: 1
POLYDIPSIA: 0
PALPITATIONS: 0
PALPITATIONS: 0
MUSCULOSKELETAL NEGATIVE: 1
BACK PAIN: 0
CHILLS: 0
MYALGIAS: 0
VOMITING: 1
COUGH: 0
WEIGHT LOSS: 1
CONSTIPATION: 0
SORE THROAT: 0
BLURRED VISION: 0
NECK PAIN: 0
ARTHRALGIAS: 1
LOSS OF CONSCIOUSNESS: 0
WEAKNESS: 1
WEAKNESS: 0
MYALGIAS: 0
CHILLS: 0
HEADACHES: 0
VOMITING: 0
HEMOPTYSIS: 0
FALLS: 1
DOUBLE VISION: 0
BRUISES/BLEEDS EASILY: 0
SEIZURES: 1
CONSTIPATION: 0
PSYCHIATRIC NEGATIVE: 1
NAUSEA: 0
BACK PAIN: 0
SHORTNESS OF BREATH: 0
ABDOMINAL PAIN: 0

## 2022-01-01 ASSESSMENT — COGNITIVE AND FUNCTIONAL STATUS - GENERAL
DRESSING REGULAR LOWER BODY CLOTHING: A LOT
TURNING FROM BACK TO SIDE WHILE IN FLAT BAD: A LOT
STANDING UP FROM CHAIR USING ARMS: A LITTLE
SUGGESTED CMS G CODE MODIFIER MOBILITY: CK
SUGGESTED CMS G CODE MODIFIER MOBILITY: CK
HELP NEEDED FOR BATHING: A LITTLE
SUGGESTED CMS G CODE MODIFIER DAILY ACTIVITY: CK
MOVING FROM LYING ON BACK TO SITTING ON SIDE OF FLAT BED: UNABLE
MOBILITY SCORE: 18
STANDING UP FROM CHAIR USING ARMS: A LOT
DAILY ACTIVITIY SCORE: 12
CLIMB 3 TO 5 STEPS WITH RAILING: A LOT
MOVING TO AND FROM BED TO CHAIR: UNABLE
MOBILITY SCORE: 6
PERSONAL GROOMING: A LITTLE
DRESSING REGULAR LOWER BODY CLOTHING: A LOT
PERSONAL GROOMING: A LITTLE
DRESSING REGULAR UPPER BODY CLOTHING: A LOT
MOVING TO AND FROM BED TO CHAIR: A LOT
DAILY ACTIVITIY SCORE: 16
TURNING FROM BACK TO SIDE WHILE IN FLAT BAD: A LOT
MOVING TO AND FROM BED TO CHAIR: A LITTLE
WALKING IN HOSPITAL ROOM: A LITTLE
STANDING UP FROM CHAIR USING ARMS: A LOT
STANDING UP FROM CHAIR USING ARMS: A LITTLE
TOILETING: TOTAL
MOVING TO AND FROM BED TO CHAIR: A LOT
SUGGESTED CMS G CODE MODIFIER MOBILITY: CK
HELP NEEDED FOR BATHING: A LOT
MOVING FROM LYING ON BACK TO SITTING ON SIDE OF FLAT BED: A LITTLE
TOILETING: A LOT
DRESSING REGULAR LOWER BODY CLOTHING: A LOT
SUGGESTED CMS G CODE MODIFIER DAILY ACTIVITY: CK
STANDING UP FROM CHAIR USING ARMS: A LOT
EATING MEALS: A LITTLE
MOVING TO AND FROM BED TO CHAIR: A LOT
SUGGESTED CMS G CODE MODIFIER MOBILITY: CM
STANDING UP FROM CHAIR USING ARMS: A LITTLE
SUGGESTED CMS G CODE MODIFIER DAILY ACTIVITY: CK
PERSONAL GROOMING: A LOT
DRESSING REGULAR UPPER BODY CLOTHING: A LOT
TURNING FROM BACK TO SIDE WHILE IN FLAT BAD: A LITTLE
EATING MEALS: A LITTLE
CLIMB 3 TO 5 STEPS WITH RAILING: A LOT
CLIMB 3 TO 5 STEPS WITH RAILING: A LOT
WALKING IN HOSPITAL ROOM: A LOT
MOBILITY SCORE: 22
MOVING TO AND FROM BED TO CHAIR: A LITTLE
PERSONAL GROOMING: A LITTLE
PERSONAL GROOMING: A LITTLE
MOVING TO AND FROM BED TO CHAIR: A LITTLE
STANDING UP FROM CHAIR USING ARMS: A LITTLE
MOVING FROM LYING ON BACK TO SITTING ON SIDE OF FLAT BED: A LITTLE
SUGGESTED CMS G CODE MODIFIER DAILY ACTIVITY: CJ
MOVING TO AND FROM BED TO CHAIR: A LOT
DRESSING REGULAR LOWER BODY CLOTHING: A LITTLE
TOILETING: A LOT
SUGGESTED CMS G CODE MODIFIER DAILY ACTIVITY: CN
SUGGESTED CMS G CODE MODIFIER DAILY ACTIVITY: CI
CLIMB 3 TO 5 STEPS WITH RAILING: A LOT
CLIMB 3 TO 5 STEPS WITH RAILING: TOTAL
CLIMB 3 TO 5 STEPS WITH RAILING: TOTAL
DAILY ACTIVITIY SCORE: 12
SUGGESTED CMS G CODE MODIFIER MOBILITY: CJ
EATING MEALS: TOTAL
MOVING FROM LYING ON BACK TO SITTING ON SIDE OF FLAT BED: A LOT
MOBILITY SCORE: 7
TURNING FROM BACK TO SIDE WHILE IN FLAT BAD: A LITTLE
MOBILITY SCORE: 15
WALKING IN HOSPITAL ROOM: TOTAL
HELP NEEDED FOR BATHING: A LOT
MOBILITY SCORE: 11
MOVING FROM LYING ON BACK TO SITTING ON SIDE OF FLAT BED: A LITTLE
MOBILITY SCORE: 7
DRESSING REGULAR UPPER BODY CLOTHING: A LITTLE
STANDING UP FROM CHAIR USING ARMS: A LITTLE
TOILETING: A LITTLE
MOBILITY SCORE: 24
SUGGESTED CMS G CODE MODIFIER MOBILITY: CM
DRESSING REGULAR LOWER BODY CLOTHING: A LOT
TURNING FROM BACK TO SIDE WHILE IN FLAT BAD: UNABLE
CLIMB 3 TO 5 STEPS WITH RAILING: TOTAL
SUGGESTED CMS G CODE MODIFIER DAILY ACTIVITY: CL
MOVING FROM LYING ON BACK TO SITTING ON SIDE OF FLAT BED: A LITTLE
CLIMB 3 TO 5 STEPS WITH RAILING: TOTAL
SUGGESTED CMS G CODE MODIFIER MOBILITY: CK
MOBILITY SCORE: 20
MOBILITY SCORE: 15
DAILY ACTIVITIY SCORE: 19
TOILETING: A LOT
DAILY ACTIVITIY SCORE: 23
STANDING UP FROM CHAIR USING ARMS: TOTAL
STANDING UP FROM CHAIR USING ARMS: A LOT
HELP NEEDED FOR BATHING: A LITTLE
SUGGESTED CMS G CODE MODIFIER MOBILITY: CL
WALKING IN HOSPITAL ROOM: A LITTLE
DAILY ACTIVITIY SCORE: 21
PERSONAL GROOMING: A LOT
PERSONAL GROOMING: A LITTLE
CLIMB 3 TO 5 STEPS WITH RAILING: A LITTLE
DRESSING REGULAR UPPER BODY CLOTHING: A LOT
EATING MEALS: A LITTLE
PERSONAL GROOMING: A LITTLE
HELP NEEDED FOR BATHING: A LITTLE
MOVING FROM LYING ON BACK TO SITTING ON SIDE OF FLAT BED: UNABLE
MOVING FROM LYING ON BACK TO SITTING ON SIDE OF FLAT BED: A LOT
WALKING IN HOSPITAL ROOM: TOTAL
PERSONAL GROOMING: TOTAL
CLIMB 3 TO 5 STEPS WITH RAILING: A LITTLE
TURNING FROM BACK TO SIDE WHILE IN FLAT BAD: A LITTLE
DAILY ACTIVITIY SCORE: 16
TURNING FROM BACK TO SIDE WHILE IN FLAT BAD: UNABLE
DRESSING REGULAR LOWER BODY CLOTHING: A LOT
DRESSING REGULAR UPPER BODY CLOTHING: A LITTLE
HELP NEEDED FOR BATHING: A LITTLE
WALKING IN HOSPITAL ROOM: A LITTLE
EATING MEALS: A LITTLE
DAILY ACTIVITIY SCORE: 6
TOILETING: A LITTLE
WALKING IN HOSPITAL ROOM: A LOT
MOVING FROM LYING ON BACK TO SITTING ON SIDE OF FLAT BED: A LOT
SUGGESTED CMS G CODE MODIFIER MOBILITY: CJ
WALKING IN HOSPITAL ROOM: A LITTLE
SUGGESTED CMS G CODE MODIFIER DAILY ACTIVITY: CK
MOVING TO AND FROM BED TO CHAIR: A LOT
DAILY ACTIVITIY SCORE: 19
PERSONAL GROOMING: A LITTLE
HELP NEEDED FOR BATHING: A LOT
SUGGESTED CMS G CODE MODIFIER DAILY ACTIVITY: CK
WALKING IN HOSPITAL ROOM: TOTAL
HELP NEEDED FOR BATHING: A LOT
MOBILITY SCORE: 13
EATING MEALS: A LITTLE
DAILY ACTIVITIY SCORE: 15
MOBILITY SCORE: 10
EATING MEALS: A LOT
SUGGESTED CMS G CODE MODIFIER DAILY ACTIVITY: CK
WALKING IN HOSPITAL ROOM: A LITTLE
MOVING TO AND FROM BED TO CHAIR: UNABLE
TOILETING: A LOT
MOBILITY SCORE: 21
SUGGESTED CMS G CODE MODIFIER MOBILITY: CL
DRESSING REGULAR LOWER BODY CLOTHING: A LITTLE
DRESSING REGULAR UPPER BODY CLOTHING: A LOT
SUGGESTED CMS G CODE MODIFIER MOBILITY: CH
HELP NEEDED FOR BATHING: A LITTLE
CLIMB 3 TO 5 STEPS WITH RAILING: A LOT
STANDING UP FROM CHAIR USING ARMS: A LOT
DRESSING REGULAR UPPER BODY CLOTHING: A LOT
DRESSING REGULAR LOWER BODY CLOTHING: A LITTLE
MOVING FROM LYING ON BACK TO SITTING ON SIDE OF FLAT BED: A LOT
DRESSING REGULAR UPPER BODY CLOTHING: A LITTLE
TOILETING: A LITTLE
SUGGESTED CMS G CODE MODIFIER MOBILITY: CJ
SUGGESTED CMS G CODE MODIFIER DAILY ACTIVITY: CK
HELP NEEDED FOR BATHING: A LITTLE
DRESSING REGULAR UPPER BODY CLOTHING: TOTAL
CLIMB 3 TO 5 STEPS WITH RAILING: A LOT
MOVING TO AND FROM BED TO CHAIR: UNABLE
CLIMB 3 TO 5 STEPS WITH RAILING: TOTAL
TURNING FROM BACK TO SIDE WHILE IN FLAT BAD: UNABLE
DRESSING REGULAR LOWER BODY CLOTHING: A LOT
STANDING UP FROM CHAIR USING ARMS: A LOT
WALKING IN HOSPITAL ROOM: A LOT
SUGGESTED CMS G CODE MODIFIER DAILY ACTIVITY: CK
MOBILITY SCORE: 15
SUGGESTED CMS G CODE MODIFIER DAILY ACTIVITY: CL
EATING MEALS: A LOT
DRESSING REGULAR LOWER BODY CLOTHING: TOTAL
SUGGESTED CMS G CODE MODIFIER MOBILITY: CK
DAILY ACTIVITIY SCORE: 23
WALKING IN HOSPITAL ROOM: A LOT
PERSONAL GROOMING: A LITTLE
DRESSING REGULAR LOWER BODY CLOTHING: A LOT
MOVING FROM LYING ON BACK TO SITTING ON SIDE OF FLAT BED: UNABLE
MOBILITY SCORE: 16
TURNING FROM BACK TO SIDE WHILE IN FLAT BAD: A LOT
TOILETING: A LITTLE
CLIMB 3 TO 5 STEPS WITH RAILING: A LITTLE
DAILY ACTIVITIY SCORE: 15
HELP NEEDED FOR BATHING: TOTAL
DRESSING REGULAR UPPER BODY CLOTHING: A LOT
SUGGESTED CMS G CODE MODIFIER DAILY ACTIVITY: CI
WALKING IN HOSPITAL ROOM: A LITTLE
SUGGESTED CMS G CODE MODIFIER MOBILITY: CL
DRESSING REGULAR UPPER BODY CLOTHING: A LITTLE
TOILETING: A LITTLE
DAILY ACTIVITIY SCORE: 15
TOILETING: A LITTLE
WALKING IN HOSPITAL ROOM: TOTAL
TOILETING: A LOT
SUGGESTED CMS G CODE MODIFIER MOBILITY: CN
STANDING UP FROM CHAIR USING ARMS: A LOT
HELP NEEDED FOR BATHING: A LOT
HELP NEEDED FOR BATHING: A LOT
TURNING FROM BACK TO SIDE WHILE IN FLAT BAD: A LITTLE
DAILY ACTIVITIY SCORE: 18

## 2022-01-01 ASSESSMENT — PAIN DESCRIPTION - PAIN TYPE
TYPE: ACUTE PAIN;SURGICAL PAIN
TYPE: ACUTE PAIN
TYPE: SURGICAL PAIN
TYPE: ACUTE PAIN
TYPE: SURGICAL PAIN
TYPE: ACUTE PAIN
TYPE: ACUTE PAIN
TYPE: ACUTE PAIN;CHRONIC PAIN
TYPE: SURGICAL PAIN
TYPE: ACUTE PAIN
TYPE: CHRONIC PAIN
TYPE: ACUTE PAIN
TYPE: ACUTE PAIN;CHRONIC PAIN
TYPE: ACUTE PAIN
TYPE: ACUTE PAIN
TYPE: ACUTE PAIN;CHRONIC PAIN
TYPE: ACUTE PAIN
TYPE: ACUTE PAIN;CHRONIC PAIN
TYPE: SURGICAL PAIN
TYPE: ACUTE PAIN;CHRONIC PAIN
TYPE: SURGICAL PAIN
TYPE: ACUTE PAIN
TYPE: ACUTE PAIN;CHRONIC PAIN
TYPE: ACUTE PAIN
TYPE: ACUTE PAIN;SURGICAL PAIN
TYPE: ACUTE PAIN
TYPE: ACUTE PAIN;SURGICAL PAIN
TYPE: ACUTE PAIN
TYPE: ACUTE PAIN;SURGICAL PAIN
TYPE: ACUTE PAIN
TYPE: ACUTE PAIN;CHRONIC PAIN
TYPE: ACUTE PAIN
TYPE: ACUTE PAIN;CHRONIC PAIN
TYPE: ACUTE PAIN;SURGICAL PAIN
TYPE: ACUTE PAIN
TYPE: ACUTE PAIN;CHRONIC PAIN
TYPE: ACUTE PAIN
TYPE: ACUTE PAIN;SURGICAL PAIN
TYPE: ACUTE PAIN
TYPE: ACUTE PAIN;SURGICAL PAIN
TYPE: ACUTE PAIN
TYPE: SURGICAL PAIN
TYPE: ACUTE PAIN;CHRONIC PAIN
TYPE: SURGICAL PAIN
TYPE: ACUTE PAIN;SURGICAL PAIN
TYPE: ACUTE PAIN
TYPE: ACUTE PAIN
TYPE: SURGICAL PAIN
TYPE: ACUTE PAIN
TYPE: SURGICAL PAIN
TYPE: ACUTE PAIN;CHRONIC PAIN
TYPE: ACUTE PAIN
TYPE: ACUTE PAIN;SURGICAL PAIN
TYPE: ACUTE PAIN
TYPE: SURGICAL PAIN
TYPE: ACUTE PAIN
TYPE: ACUTE PAIN;SURGICAL PAIN
TYPE: ACUTE PAIN
TYPE: ACUTE PAIN;SURGICAL PAIN
TYPE: ACUTE PAIN
TYPE: SURGICAL PAIN
TYPE: ACUTE PAIN
TYPE: ACUTE PAIN;SURGICAL PAIN
TYPE: ACUTE PAIN
TYPE: ACUTE PAIN
TYPE: SURGICAL PAIN
TYPE: ACUTE PAIN
TYPE: SURGICAL PAIN
TYPE: ACUTE PAIN
TYPE: ACUTE PAIN
TYPE: ACUTE PAIN;CHRONIC PAIN
TYPE: ACUTE PAIN
TYPE: SURGICAL PAIN
TYPE: ACUTE PAIN
TYPE: SURGICAL PAIN
TYPE: ACUTE PAIN
TYPE: SURGICAL PAIN
TYPE: ACUTE PAIN
TYPE: ACUTE PAIN;CHRONIC PAIN
TYPE: ACUTE PAIN
TYPE: ACUTE PAIN
TYPE: ACUTE PAIN;SURGICAL PAIN
TYPE: ACUTE PAIN
TYPE: ACUTE PAIN;CHRONIC PAIN
TYPE: ACUTE PAIN
TYPE: SURGICAL PAIN
TYPE: ACUTE PAIN
TYPE: SURGICAL PAIN
TYPE: ACUTE PAIN
TYPE: ACUTE PAIN;SURGICAL PAIN
TYPE: ACUTE PAIN
TYPE: SURGICAL PAIN
TYPE: ACUTE PAIN
TYPE: CHRONIC PAIN
TYPE: ACUTE PAIN
TYPE: ACUTE PAIN;CHRONIC PAIN
TYPE: ACUTE PAIN;CHRONIC PAIN
TYPE: ACUTE PAIN
TYPE: ACUTE PAIN;SURGICAL PAIN
TYPE: ACUTE PAIN
TYPE: ACUTE PAIN
TYPE: ACUTE PAIN;SURGICAL PAIN
TYPE: ACUTE PAIN;CHRONIC PAIN
TYPE: ACUTE PAIN
TYPE: ACUTE PAIN;SURGICAL PAIN
TYPE: ACUTE PAIN
TYPE: SURGICAL PAIN

## 2022-01-01 ASSESSMENT — LIFESTYLE VARIABLES
AVERAGE NUMBER OF DAYS PER WEEK YOU HAVE A DRINK CONTAINING ALCOHOL: 0
TOTAL SCORE: 0
AVERAGE NUMBER OF DAYS PER WEEK YOU HAVE A DRINK CONTAINING ALCOHOL: 0
HOW MANY TIMES IN THE PAST YEAR HAVE YOU HAD 5 OR MORE DRINKS IN A DAY: 0
EVER FELT BAD OR GUILTY ABOUT YOUR DRINKING: NO
ALCOHOL_USE: NO
TOTAL SCORE: 0
DOES PATIENT WANT TO STOP DRINKING: NO
DO YOU DRINK ALCOHOL: NO
TOTAL SCORE: 0
EVER HAD A DRINK FIRST THING IN THE MORNING TO STEADY YOUR NERVES TO GET RID OF A HANGOVER: NO
ALCOHOL_USE: NO
TOTAL SCORE: 0
ON A TYPICAL DAY WHEN YOU DRINK ALCOHOL HOW MANY DRINKS DO YOU HAVE: 0
TOTAL SCORE: 0
SUBSTANCE_ABUSE: 0
TOTAL SCORE: 0
CONSUMPTION TOTAL: INCOMPLETE
HAVE YOU EVER FELT YOU SHOULD CUT DOWN ON YOUR DRINKING: NO
DO YOU DRINK ALCOHOL: NO
SUBSTANCE_ABUSE: 0
SUBSTANCE_ABUSE: 0
ON A TYPICAL DAY WHEN YOU DRINK ALCOHOL HOW MANY DRINKS DO YOU HAVE: 0
AVERAGE NUMBER OF DAYS PER WEEK YOU HAVE A DRINK CONTAINING ALCOHOL: 0
TOTAL SCORE: 0
TOTAL SCORE: 0
HOW MANY TIMES IN THE PAST YEAR HAVE YOU HAD 5 OR MORE DRINKS IN A DAY: 0
SUBSTANCE_ABUSE: 0
AVERAGE NUMBER OF DAYS PER WEEK YOU HAVE A DRINK CONTAINING ALCOHOL: 0
CONSUMPTION TOTAL: NEGATIVE
ALCOHOL_USE: NO
HAVE YOU EVER FELT YOU SHOULD CUT DOWN ON YOUR DRINKING: NO
CONSUMPTION TOTAL: NEGATIVE
SUBSTANCE_ABUSE: 0
EVER HAD A DRINK FIRST THING IN THE MORNING TO STEADY YOUR NERVES TO GET RID OF A HANGOVER: NO
CONSUMPTION TOTAL: NEGATIVE
DOES PATIENT WANT TO STOP DRINKING: NO
HAVE PEOPLE ANNOYED YOU BY CRITICIZING YOUR DRINKING: NO
HAVE PEOPLE ANNOYED YOU BY CRITICIZING YOUR DRINKING: NO
ON A TYPICAL DAY WHEN YOU DRINK ALCOHOL HOW MANY DRINKS DO YOU HAVE: 0
EVER FELT BAD OR GUILTY ABOUT YOUR DRINKING: NO
EVER HAD A DRINK FIRST THING IN THE MORNING TO STEADY YOUR NERVES TO GET RID OF A HANGOVER: NO
CONSUMPTION TOTAL: NEGATIVE
EVER HAD A DRINK FIRST THING IN THE MORNING TO STEADY YOUR NERVES TO GET RID OF A HANGOVER: NO
CONSUMPTION TOTAL: NEGATIVE
HAVE YOU EVER FELT YOU SHOULD CUT DOWN ON YOUR DRINKING: NO
AVERAGE NUMBER OF DAYS PER WEEK YOU HAVE A DRINK CONTAINING ALCOHOL: 0
EVER HAD A DRINK FIRST THING IN THE MORNING TO STEADY YOUR NERVES TO GET RID OF A HANGOVER: NO
HAVE PEOPLE ANNOYED YOU BY CRITICIZING YOUR DRINKING: NO
TOTAL SCORE: 0
SUBSTANCE_ABUSE: 0
SUBSTANCE_ABUSE: 0
EVER HAD A DRINK FIRST THING IN THE MORNING TO STEADY YOUR NERVES TO GET RID OF A HANGOVER: NO
TOTAL SCORE: 0
TOTAL SCORE: 0
HAVE YOU EVER FELT YOU SHOULD CUT DOWN ON YOUR DRINKING: NO
TOTAL SCORE: 0
AVERAGE NUMBER OF DAYS PER WEEK YOU HAVE A DRINK CONTAINING ALCOHOL: 0
TOTAL SCORE: 0
HAVE YOU EVER FELT YOU SHOULD CUT DOWN ON YOUR DRINKING: NO
ON A TYPICAL DAY WHEN YOU DRINK ALCOHOL HOW MANY DRINKS DO YOU HAVE: 0
HOW MANY TIMES IN THE PAST YEAR HAVE YOU HAD 5 OR MORE DRINKS IN A DAY: 0
ALCOHOL_USE: NO
TOTAL SCORE: 0
TOTAL SCORE: 0
EVER FELT BAD OR GUILTY ABOUT YOUR DRINKING: NO
HOW MANY TIMES IN THE PAST YEAR HAVE YOU HAD 5 OR MORE DRINKS IN A DAY: 0
SUBSTANCE_ABUSE: 0
CONSUMPTION TOTAL: NEGATIVE
HOW MANY TIMES IN THE PAST YEAR HAVE YOU HAD 5 OR MORE DRINKS IN A DAY: 0
SUBSTANCE_ABUSE: 0
HAVE YOU EVER FELT YOU SHOULD CUT DOWN ON YOUR DRINKING: NO
EVER FELT BAD OR GUILTY ABOUT YOUR DRINKING: NO
HAVE YOU EVER FELT YOU SHOULD CUT DOWN ON YOUR DRINKING: NO
HAVE PEOPLE ANNOYED YOU BY CRITICIZING YOUR DRINKING: NO
EVER FELT BAD OR GUILTY ABOUT YOUR DRINKING: NO
ON A TYPICAL DAY WHEN YOU DRINK ALCOHOL HOW MANY DRINKS DO YOU HAVE: 0
TOTAL SCORE: 0
ON A TYPICAL DAY WHEN YOU DRINK ALCOHOL HOW MANY DRINKS DO YOU HAVE: 0
HOW MANY TIMES IN THE PAST YEAR HAVE YOU HAD 5 OR MORE DRINKS IN A DAY: 0
SUBSTANCE_ABUSE: 0
TOTAL SCORE: 0
EVER FELT BAD OR GUILTY ABOUT YOUR DRINKING: NO
HAVE PEOPLE ANNOYED YOU BY CRITICIZING YOUR DRINKING: NO
HAVE PEOPLE ANNOYED YOU BY CRITICIZING YOUR DRINKING: NO
EVER FELT BAD OR GUILTY ABOUT YOUR DRINKING: NO
HAVE PEOPLE ANNOYED YOU BY CRITICIZING YOUR DRINKING: NO
EVER HAD A DRINK FIRST THING IN THE MORNING TO STEADY YOUR NERVES TO GET RID OF A HANGOVER: NO
SUBSTANCE_ABUSE: 0
ALCOHOL_USE: NO
TOTAL SCORE: 0
TOTAL SCORE: 0

## 2022-01-01 ASSESSMENT — PATIENT HEALTH QUESTIONNAIRE - PHQ9
SUM OF ALL RESPONSES TO PHQ9 QUESTIONS 1 AND 2: 2
2. FEELING DOWN, DEPRESSED, IRRITABLE, OR HOPELESS: 00
6. FEELING BAD ABOUT YOURSELF - OR THAT YOU ARE A FAILURE OR HAVE LET YOURSELF OR YOUR FAMILY DOWN: SEVERAL DAYS
1. LITTLE INTEREST OR PLEASURE IN DOING THINGS: 00
9. THOUGHTS THAT YOU WOULD BE BETTER OFF DEAD, OR OF HURTING YOURSELF: NOT AT ALL
2. FEELING DOWN, DEPRESSED, IRRITABLE, OR HOPELESS: NOT AT ALL
SUM OF ALL RESPONSES TO PHQ QUESTIONS 1-9: 22
CLINICAL INTERPRETATION OF PHQ2 SCORE: 6
1. LITTLE INTEREST OR PLEASURE IN DOING THINGS: SEVERAL DAYS
2. FEELING DOWN, DEPRESSED, IRRITABLE, OR HOPELESS: SEVERAL DAYS
7. TROUBLE CONCENTRATING ON THINGS, SUCH AS READING THE NEWSPAPER OR WATCHING TELEVISION: NOT AT ALL
4. FEELING TIRED OR HAVING LITTLE ENERGY: SEVERAL DAYS
SUM OF ALL RESPONSES TO PHQ9 QUESTIONS 1 AND 2: 0
8. MOVING OR SPEAKING SO SLOWLY THAT OTHER PEOPLE COULD HAVE NOTICED. OR THE OPPOSITE, BEING SO FIGETY OR RESTLESS THAT YOU HAVE BEEN MOVING AROUND A LOT MORE THAN USUAL: SEVERAL DAYS
1. LITTLE INTEREST OR PLEASURE IN DOING THINGS: NOT AT ALL
2. FEELING DOWN, DEPRESSED, IRRITABLE, OR HOPELESS: NOT AT ALL
1. LITTLE INTEREST OR PLEASURE IN DOING THINGS: NOT AT ALL
SUM OF ALL RESPONSES TO PHQ QUESTIONS 1-9: 7
SUM OF ALL RESPONSES TO PHQ9 QUESTIONS 1 AND 2: 0
4. FEELING TIRED OR HAVING LITTLE ENERGY: NOT AT ALL
8. MOVING OR SPEAKING SO SLOWLY THAT OTHER PEOPLE COULD HAVE NOTICED. OR THE OPPOSITE, BEING SO FIGETY OR RESTLESS THAT YOU HAVE BEEN MOVING AROUND A LOT MORE THAN USUAL: SEVERAL DAYS
5. POOR APPETITE OR OVEREATING: SEVERAL DAYS
5. POOR APPETITE OR OVEREATING: SEVERAL DAYS
SUM OF ALL RESPONSES TO PHQ9 QUESTIONS 1 AND 2: 0
2. FEELING DOWN, DEPRESSED, IRRITABLE, OR HOPELESS: NOT AT ALL
2. FEELING DOWN, DEPRESSED, IRRITABLE, OR HOPELESS: SEVERAL DAYS
1. LITTLE INTEREST OR PLEASURE IN DOING THINGS: NOT AT ALL
5. POOR APPETITE OR OVEREATING: 2 - MORE THAN HALF THE DAYS
CLINICAL INTERPRETATION OF PHQ2 SCORE: 4
7. TROUBLE CONCENTRATING ON THINGS, SUCH AS READING THE NEWSPAPER OR WATCHING TELEVISION: NOT AT ALL
3. TROUBLE FALLING OR STAYING ASLEEP OR SLEEPING TOO MUCH: SEVERAL DAYS
CLINICAL INTERPRETATION OF PHQ2 SCORE: 0
2. FEELING DOWN, DEPRESSED, IRRITABLE, OR HOPELESS: NOT AT ALL
5. POOR APPETITE OR OVEREATING: 3 - NEARLY EVERY DAY
SUM OF ALL RESPONSES TO PHQ9 QUESTIONS 1 AND 2: 2
9. THOUGHTS THAT YOU WOULD BE BETTER OFF DEAD, OR OF HURTING YOURSELF: SEVERAL DAYS
SUM OF ALL RESPONSES TO PHQ9 QUESTIONS 1 AND 2: 0
3. TROUBLE FALLING OR STAYING ASLEEP OR SLEEPING TOO MUCH: SEVERAL DAYS
6. FEELING BAD ABOUT YOURSELF - OR THAT YOU ARE A FAILURE OR HAVE LET YOURSELF OR YOUR FAMILY DOWN: SEVERAL DAYS
SUM OF ALL RESPONSES TO PHQ QUESTIONS 1-9: 7
SUM OF ALL RESPONSES TO PHQ QUESTIONS 1-9: 18
1. LITTLE INTEREST OR PLEASURE IN DOING THINGS: NOT AT ALL
1. LITTLE INTEREST OR PLEASURE IN DOING THINGS: SEVERAL DAYS
CLINICAL INTERPRETATION OF PHQ2 SCORE: 0
CLINICAL INTERPRETATION OF PHQ2 SCORE: 0

## 2022-01-01 ASSESSMENT — GAIT ASSESSMENTS
ASSISTIVE DEVICE: FRONT WHEEL WALKER
DEVIATION: SHUFFLED GAIT;BRADYKINETIC;INCREASED BASE OF SUPPORT
GAIT LEVEL OF ASSIST: UNABLE TO PARTICIPATE
DISTANCE (FEET): 120
DEVIATION: ANTALGIC;OTHER (COMMENT)
GAIT LEVEL OF ASSIST: MODERATE ASSIST
DISTANCE (FEET): 20
DEVIATION: SHUFFLED GAIT
GAIT LEVEL OF ASSIST: SUPERVISED
GAIT LEVEL OF ASSIST: UNABLE TO PARTICIPATE
GAIT LEVEL OF ASSIST: MINIMAL ASSIST
DISTANCE (FEET): 100
ASSISTIVE DEVICE: HAND HELD ASSIST
ASSISTIVE DEVICE: HAND HELD ASSIST
GAIT LEVEL OF ASSIST: MINIMAL ASSIST
DISTANCE (FEET): 50
DISTANCE (FEET): 2
ASSISTIVE DEVICE: FRONT WHEEL WALKER
DISTANCE (FEET): 20
DEVIATION: SHUFFLED GAIT;BRADYKINETIC;INCREASED BASE OF SUPPORT
DISTANCE (FEET): 160
DEVIATION: BRADYKINETIC
GAIT LEVEL OF ASSIST: STANDBY ASSIST
ASSISTIVE DEVICE: FRONT WHEEL WALKER

## 2022-01-01 ASSESSMENT — FIBROSIS 4 INDEX
FIB4 SCORE: 3.31
FIB4 SCORE: 5.17
FIB4 SCORE: 4.34
FIB4 SCORE: 4.16
FIB4 SCORE: 3.62
FIB4 SCORE: 5.26
FIB4 SCORE: 2.91
FIB4 SCORE: 5.26
FIB4 SCORE: 4.11
FIB4 SCORE: 2.52
FIB4 SCORE: 3.62
FIB4 SCORE: 4.47
FIB4 SCORE: 3.21
FIB4 SCORE: 3.62
FIB4 SCORE: 5.26
FIB4 SCORE: 2.51
FIB4 SCORE: 2.4
FIB4 SCORE: 2.77
FIB4 SCORE: 2.44
FIB4 SCORE: 2.51
FIB4 SCORE: 4.34
FIB4 SCORE: 2.333333333333333333
FIB4 SCORE: 3.18
FIB4 SCORE: 2.08
FIB4 SCORE: 5.26
FIB4 SCORE: 3.21
FIB4 SCORE: 3.62
FIB4 SCORE: 1.86
FIB4 SCORE: 2.4
FIB4 SCORE: 2.84
FIB4 SCORE: 2.38
FIB4 SCORE: 5.26
FIB4 SCORE: 3.54
FIB4 SCORE: 2.333333333333333333
FIB4 SCORE: 3.61
FIB4 SCORE: 2.44
FIB4 SCORE: 3.67
FIB4 SCORE: 4.34
FIB4 SCORE: 6.53
FIB4 SCORE: 2.4
FIB4 SCORE: 2.4
FIB4 SCORE: 2.51
FIB4 SCORE: 4.47
FIB4 SCORE: 2.77
FIB4 SCORE: 3.71
FIB4 SCORE: 2.4
FIB4 SCORE: 4.47
FIB4 SCORE: 3.62
FIB4 SCORE: 2.57

## 2022-01-01 ASSESSMENT — ANXIETY QUESTIONNAIRES
2. NOT BEING ABLE TO STOP OR CONTROL WORRYING: MORE THAN HALF THE DAYS
3. WORRYING TOO MUCH ABOUT DIFFERENT THINGS: MORE THAN HALF THE DAYS
6. BECOMING EASILY ANNOYED OR IRRITABLE: MORE THAN HALF THE DAYS
4. TROUBLE RELAXING: NEARLY EVERY DAY
7. FEELING AFRAID AS IF SOMETHING AWFUL MIGHT HAPPEN: MORE THAN HALF THE DAYS
6. BECOMING EASILY ANNOYED OR IRRITABLE: MORE THAN HALF THE DAYS
5. BEING SO RESTLESS THAT IT IS HARD TO SIT STILL: MORE THAN HALF THE DAYS
2. NOT BEING ABLE TO STOP OR CONTROL WORRYING: NEARLY EVERY DAY
5. BEING SO RESTLESS THAT IT IS HARD TO SIT STILL: MORE THAN HALF THE DAYS
1. FEELING NERVOUS, ANXIOUS, OR ON EDGE: MORE THAN HALF THE DAYS
3. WORRYING TOO MUCH ABOUT DIFFERENT THINGS: NEARLY EVERY DAY
GAD7 TOTAL SCORE: 17
7. FEELING AFRAID AS IF SOMETHING AWFUL MIGHT HAPPEN: SEVERAL DAYS
IF YOU CHECKED OFF ANY PROBLEMS ON THIS QUESTIONNAIRE, HOW DIFFICULT HAVE THESE PROBLEMS MADE IT FOR YOU TO DO YOUR WORK, TAKE CARE OF THINGS AT HOME, OR GET ALONG WITH OTHER PEOPLE: VERY DIFFICULT
4. TROUBLE RELAXING: MORE THAN HALF THE DAYS
1. FEELING NERVOUS, ANXIOUS, OR ON EDGE: NEARLY EVERY DAY
GAD7 TOTAL SCORE: 14

## 2022-01-01 ASSESSMENT — ACTIVITIES OF DAILY LIVING (ADL)
OASIS_M1830: 05
TOILETING: INDEPENDENT
TOILETING: REQUIRES ASSIST
OASIS_M1830: 02
TOILETING: INDEPENDENT
HOME_HEALTH_OASIS: 01
HOME_HEALTH_OASIS: 02
OASIS_M1830: 05
TOILETING: REQUIRES ASSIST
TOILETING: REQUIRES ASSIST

## 2022-01-01 ASSESSMENT — PAIN SCALES - GENERAL
PAIN_LEVEL: 1
PAIN_LEVEL: 2

## 2022-01-03 NOTE — ED NOTES
Pt refusing EKG as she is worried about how much it will cost. Explained to the need for EKG and risks of not having it done and pt demonstrated understanding.

## 2022-01-03 NOTE — ED NOTES
Called for patient x3, checked senior lounge, outside, and bathrooms. Patient to be dismissed from the system.

## 2022-01-03 NOTE — ED TRIAGE NOTES
Chief Complaint   Patient presents with   • Shortness of Breath     x3 weeks but worse tonight; pt recently hospitalized for pneumonia   • Chest Pain     x3 weeks but worse tonight; described as tightness     Pt ambulatory to triage with  for above complaint. Pt recently admitted at [facility] for pneumonia and was discharged one week ago. Pt is currently on 6L O2 via NC which she wears at home for her COPD. Pt refusing EKG in triage as she is worried about the cost.    Pt is alert/oriented and follows commands. Pt speaking in full sentences and responds appropriately to questions. No acute distress noted in triage and respirations are even and unlabored.     Pt placed in lobby and educated on triage process. Pt and  encouraged to alert staff for any changes in condition.

## 2022-01-04 NOTE — TELEPHONE ENCOUNTER
Received request via: Pharmacy    Was the patient seen in the last year in this department? Yes  LOV 12/30/2021  Does the patient have an active prescription (recently filled or refills available) for medication(s) requested? No

## 2022-01-13 PROBLEM — E44.0 MODERATE PROTEIN-CALORIE MALNUTRITION (HCC): Status: ACTIVE | Noted: 2022-01-01

## 2022-01-13 NOTE — PROGRESS NOTES
"Chief Complaint   Patient presents with   • HTN (Controlled)   • Pulmonary Hypertension   • Chronic Kidney Disease       Subjective     Roxana Cuba is a 61 y.o. female who presents today A couple months ago during hospitalization was normal.as a follow-up.    Since she was last seen she feels like she is retaining fluid.  She is not having any chest pain.  Her blood pressure is controlled.  She had no changes in her pH symptoms.  She has been losing weight.  She has no appetite.  She is now down to 140 pounds.  She has an approximate 20 pound weight loss in the last couple months.    Past Medical History:   Diagnosis Date   • Anemia    • Anesthesia     \"takes more to get me under, would rather be intubated\"    • Breath shortness     cont oxygen 5L (Preffered)   • Bronchitis      2016   • Cardiomegaly    • Chronic fatigue and malaise    • Chronic obstructive pulmonary disease (HCC)    • Cirrhosis (HCC) December 2011    Status post liver transplant at [facility] [place]   • CKD (chronic kidney disease) stage 3, GFR 30-59 ml/min ([facility])    • Diabetes ([facility])     reports hx of, resolved w/weight loss. Reports still checking bloodsugars twice daily and using insulin PRN   • Fracture of left foot    • GERD (gastroesophageal reflux disease)         • H/O Clostridium difficile infection 02-17-17    reports \"16 months ago\". Current stool sample 01-26-17 neg   • Hemorrhagic disorder (HCC)     \"low platelets\" bruises easily    • Hiatus hernia syndrome    • High cholesterol    • Hypothyroid    • Jaundice 2009   • Liver transplanted ([facility])    • Low back pain 02-17-17    and left foot, 7/10   • Mild aortic regurgitation and aortic valve sclerosis     • On home oxygen therapy     5 liters, Dr. Gaming   • Platelet disorder (HCC)     low platelets   • Pneumonia     aspiration,    • Psychiatric disorder     Mood disorder   • Pulmonary hypertension (HCC)        • S/P cholecystectomy    • Small bowel obstruction (HCC)     01-   • " Splenomegaly    • VRE (vancomycin-resistant Enterococci)     02-17-17, pt declines knowledge of this     Past Surgical History:   Procedure Laterality Date   • CRANIOTOMY Left 8/4/2021    Procedure: CRANIOTOMY;  Surgeon: Tramaine Arnold III, M.D.;  [facility]: [facility]ERY [facility];  Service: Neurosurgery   • VENTRAL HERNIA REPAIR ROBOTIC XI N/A 11/12/2018    Procedure: VENTRAL HERNIA REPAIR ROBOTIC XI- FOR EPIGASTRIC INCISIONAL;  Surgeon: John H Ganser, M.D.;  Location: SURGERY [facility];  Service: General   • TURBINATE REDUCTION Bilateral 10/4/2018    Procedure: TURBINATE REDUCTION;  Surgeon: Silviano Erazo M.D.;  Location: SURGERY SAME DAY [facility];  Service: Ent   • NASAL RECONSTRUCTION Bilateral 10/4/2018    Procedure: NASAL RECONSTRUCTION- LATERA IMPLANTS;  Surgeon: Silviano Erazo M.D.;  Location: SURGERY SAME DAY [facility];  Service: Ent   • OTHER  12/11/2017    paniculectomy   • COLONOSCOPY N/A 3/27/2017    Procedure: COLONOSCOPY;  Surgeon: Osman Matt M.D.;  Location: SURGERY SAME DAY [facility];  Service:    • GASTROSCOPY N/A 3/27/2017    Procedure: GASTROSCOPY;  Surgeon: Osman Matt M.D.;  Location: SURGERY SAME DAY [facility];  Service:    • BREAST BIOPSY Left 2/21/2017    Procedure: BREAST BIOPSY - WIRE LOCALIZED EXCISONAL ;  Surgeon: Jane Zhao M.D.;  Location: SURGERY SAME DAY [facility];  Service:    • LUNG BIOPSY OPEN  11/2016   • OTHER ABDOMINAL SURGERY      Gasric Sleeve   • BONE MARROW ASPIRATION  3/16/2015    Performed by Freddie Hi M.D. at ENDOSCOPY [facility]   • BONE MARROW BIOPSY, NDL/TROCAR  3/16/2015    Performed by Freddie Hi M.D. at ENDOSCOPY [facility]   • RECOVERY  3/31/2014    Performed by [facility] at SURGERY [facility]   • RECOVERY  3/24/2014    Performed by [facility] at SURGERY SAME DAY [facility]   • RECOVERY  1/21/2014    Performed by [facility] at SURGERY SAME DAY [facility]   •  BRONCHOSCOPY-ENDO  11/15/2013    Performed by Ha Perez M.D. at ENDOSCOPY [facility]   • RECOVERY  2/27/2013    Performed by [facility] at SURGERY [facility] Health system   • BONE MARROW ASPIRATION  12/31/2012    Performed by Josemanuel Real M.D. at ENDOSCOPY [facility]   • BONE MARROW BIOPSY, NDL/TROCAR  12/31/2012    Performed by Josemanuel Real M.D. at ENDOSCOPY [facility]   • GASTROSCOPY  9/28/2012    Performed by Aravind Richards M.D. at SURGERY [facility]   • SIGMOIDOSCOPY FLEX  9/26/2012    Performed by Kristopher Cardoso M.D. at ENDOSCOPY [facility]   • BRONCHOSCOPY-ENDO  6/19/2012    Performed by MARLENA MURILLO at ENDOSCOPY [facility]   • BRONCHOSCOPY-ENDO  5/29/2012    Performed by SUYAPA CAMARENA at ENDOSCOPY [facility]   • OTHER ABDOMINAL SURGERY  December 2011    Liver transplant at [facility] [place] by Dr. Canada.   • GASTROSCOPY-ENDO  3/12/2010    Performed by CAMELIA SAMANO at ENDOSCOPY [facility]   • EXAM UNDER ANESTHESIA  11/11/2009    Performed by BIRD ABDI at SURGERY [facility]   • HEMORRHOIDECTOMY  11/11/2009    Performed by BIRD ABDI at SURGERY [facility]   • KELBY BY LAPAROSCOPY  9/19/2009    Performed by BIRD ABDI at SURGERY [facility]   • CARPAL TUNNEL RELEASE          • KELBY BY LAPAROSCOPY     • GASTRIC BYPASS LAPAROSCOPIC     • HERNIA REPAIR      x3   • HYSTERECTOMY, TOTAL ABDOMINAL          • OTHER      Breast reduction   • OTHER      liver transplant   • GA ANESTH,NOSE,SINUS SURGERY      x4   • TONSILLECTOMY       Family History   Problem Relation Age of Onset   • Other Father         Unknown (dead 10 years)   • Diabetes Father    • Heart Disease Father    • Hypertension Father    • Hyperlipidemia Father    • Stroke Father    • Non-contributory Mother    • Hyperlipidemia Mother    • Alcohol/Drug Mother    • Cancer Paternal Aunt    • Alcohol/Drug Maternal Grandmother    • Alcohol/Drug Maternal Grandfather       Social History     Socioeconomic History   • Marital status:      Spouse name: Not on file   • Number of children: Not on file   • Years of education: Not on file   • Highest education level: Not on file   Occupational History   • Not on file   Tobacco Use   • Smoking status: Never Smoker   • Smokeless tobacco: Never Used   • Tobacco comment: avoid all tobacco products   Vaping Use   • Vaping Use: Never used   Substance and Sexual Activity   • Alcohol use: Not Currently     Alcohol/week: 0.0 oz   • Drug use: No   • Sexual activity: Not on file   Other Topics Concern   • Primary/coprimary nurse & associates Not Asked   • Family contact information Not Asked   • OK to release patient information to the following Not Asked   • Patient preferred routine/Privacy concerns Not Asked   • Patient likes and dislikes Not Asked   • Participating In Research Study Not Asked   • Miscellaneous Not Asked   Social History Narrative   • Not on file     Social Determinants of Health     Financial Resource Strain:    • Difficulty of Paying Living Expenses: Not on file   Food Insecurity:    • Worried About Running Out of Food in the Last Year: Not on file   • Ran Out of Food in the Last Year: Not on file   Transportation Needs:    • Lack of Transportation (Medical): Not on file   • Lack of Transportation (Non-Medical): Not on file   Physical Activity:    • Days of Exercise per Week: Not on file   • Minutes of Exercise per Session: Not on file   Stress:    • Feeling of Stress : Not on file   Social Connections:    • Frequency of Communication with Friends and Family: Not on file   • Frequency of Social Gatherings with Friends and Family: Not on file   • Attends Christianity Services: Not on file   • Active Member of Clubs or Organizations: Not on file   • Attends Club or Organization Meetings: Not on file   • Marital Status: Not on file   Intimate Partner Violence:    • Fear of Current or Ex-Partner: Not on file   • Emotionally  Abused: Not on file   • Physically Abused: Not on file   • Sexually Abused: Not on file   Housing Stability:    • Unable to Pay for Housing in the Last Year: Not on file   • Number of Places Lived in the Last Year: Not on file   • Unstable Housing in the Last Year: Not on file     Allergies   Allergen Reactions   • Rhubarb Anaphylaxis   • Organic Nitrates Unspecified     Nitroglycerin products should be avoided with the use of PDE-5 inhibitors as the combination can result in severe hypotension.  RD clarified with pt: Nitrates in food are okay and pt does not want nitrates to be listed as food allergy. Pt only avoids medications with nitrates.    • Other Drug      Any medication that may interact with cyclosporine, tacrolimus, sirolimus, or prograf (due to hx of liver transplant)    • Nsaids Unspecified     Can not take due to hx of liver transplant      Outpatient Encounter Medications as of 1/13/2022   Medication Sig Dispense Refill   • fludrocortisone (FLORINEF) 0.1 MG Tab TAKE 2 TABLETS BY MOUTH EVERY DAY 60 Tablet 0   • furosemide (LASIX) 20 MG Tab Take 1 Tablet by mouth every day. Indications: Edema 30 Tablet 0   • potassium chloride SA (KDUR) 20 MEQ Tab CR Take 1 Tablet by mouth every day. 15 Tablet 0   • ambrisentan (LETAIRIS) 10 MG tablet TAKE 1 TABLET BY MOUTH EVERY DAY 30 Tablet 11   • gabapentin (NEURONTIN) 100 MG Cap Take 1 Capsule by mouth 2 times a day. 180 Capsule 1   • levothyroxine (SYNTHROID) 137 MCG Tab Take 1 Tablet by mouth every morning on an empty stomach. 90 Tablet 1   • tacrolimus (PROGRAF) 1 MG Cap 4mg     • tadalafil (CIALIS) 20 MG tablet Take 1 Tablet by mouth every day. 90 Tablet 3   • lacosamide (VIMPAT) 100 MG Tab tablet Take 1 Tablet by mouth 2 times a day for 90 days. 60 Tablet 2   • levETIRAcetam (KEPPRA) 750 MG tablet Take 1 Tablet by mouth 2 times a day. 180 Tablet 2   • omeprazole (PRILOSEC) 40 MG delayed-release capsule Take 1 Capsule by mouth every day. 90 Capsule 3   •  "pravastatin (PRAVACHOL) 20 MG Tab Take 1 Tablet by mouth every day. 90 Tablet 3   • sertraline (ZOLOFT) 100 MG Tab TAKE 1 TABLET BY MOUTH EVERY DAY 90 Tablet 1   • ondansetron (ZOFRAN ODT) 4 MG TABLET DISPERSIBLE Take 1 Tablet by mouth every 8 hours as needed for Nausea. 30 Tablet 2   • traZODone (DESYREL) 50 MG Tab Take 1 Tablet by mouth at bedtime. 901 Tablet 3   • Home Care Oxygen Inhale 3 L/min continuous. Oxygen dose range: 3 L/min  Respiratory route via: Nasal Cannula   Oxygen supplier: Preferred         • mycophenolate (CELLCEPT) 250 MG Cap Take 250 mg by mouth 2 times a day. Indications: Liver Transplant Recipient     • oxyCODONE immediate release (ROXICODONE) 10 MG immediate release tablet Take 10 mg by mouth every 6 hours as needed for Moderate Pain.     • therapeutic multivitamin-minerals (THERAGRAN-M) Tab Take 1 tablet by mouth every day.     • Tadalafil, PAH, 20 MG Tab Take 20 mg by mouth every day.       No facility-administered encounter medications on file as of 1/13/2022.     Review of Systems   Constitutional: Negative.  Negative for chills, fever and malaise/fatigue.   HENT: Negative.  Negative for sore throat.    Eyes: Negative.    Respiratory: Negative.  Negative for cough, hemoptysis, sputum production, shortness of breath, wheezing and stridor.    Cardiovascular: Negative.  Negative for chest pain, palpitations, orthopnea, claudication, leg swelling and PND.   Gastrointestinal: Negative.    Genitourinary: Negative.    Musculoskeletal: Negative.    Skin: Negative.    Neurological: Negative.  Negative for dizziness, loss of consciousness and weakness.   Endo/Heme/Allergies: Negative.  Does not bruise/bleed easily.   All other systems reviewed and are negative.             Objective     /98 (BP Location: Left arm, Patient Position: Sitting, BP Cuff Size: Adult)   Pulse 71   Resp 12   Ht 1.753 m (5' 9\")   Wt 63.5 kg (140 lb)   LMP 01/03/2000   SpO2 99%   BMI 20.67 kg/m²     Physical " Exam  Vitals and nursing note reviewed.   Constitutional:       General: She is not in acute distress.     Appearance: She is well-developed. She is not diaphoretic.   HENT:      Head: Normocephalic and atraumatic.      Right Ear: External ear normal.      Left Ear: External ear normal.      Nose: Nose normal.      Mouth/Throat:      Pharynx: No oropharyngeal exudate.   Eyes:      General: No scleral icterus.        Right eye: No discharge.         Left eye: No discharge.      Conjunctiva/sclera: Conjunctivae normal.      Pupils: Pupils are equal, round, and reactive to light.   Neck:      Vascular: No JVD.   Cardiovascular:      Rate and Rhythm: Normal rate and regular rhythm.      Heart sounds: No murmur heard.  No friction rub. No gallop.    Pulmonary:      Effort: Pulmonary effort is normal. No respiratory distress.      Breath sounds: No stridor. No wheezing or rales.   Chest:      Chest wall: No tenderness.   Abdominal:      General: There is no distension.      Palpations: Abdomen is soft.      Tenderness: There is no guarding.   Musculoskeletal:         General: No tenderness or deformity. Normal range of motion.      Cervical back: Neck supple.   Skin:     General: Skin is warm and dry.      Coloration: Skin is not pale.      Findings: No erythema or rash.   Neurological:      Mental Status: She is alert.      Cranial Nerves: No cranial nerve deficit.      Motor: No abnormal muscle tone.      Coordination: Coordination normal.      Deep Tendon Reflexes: Reflexes are normal and symmetric. Reflexes normal.   Psychiatric:         Behavior: Behavior normal.         Thought Content: Thought content normal.         Judgment: Judgment normal.                Assessment & Plan     1. Status post liver transplant Dr. Canada ([facility])     2. Hypernatremia     3. History of pneumonia     4. Seizures (HCC)     5. Hypomagnesemia     6. Dizziness     7. History of pancreatitis     8. Other hypervolemia     9. H/O  non-ST elevation myocardial infarction (NSTEMI)     10. DUC (generalized anxiety disorder)     11. Sarcoidosis (HCC)     12. Interstitial lung disease (HCC)     13. MGUS (monoclonal gammopathy of unknown significance)     14. Hepatopulmonary syndrome (HCC)     15. Ostium secundum type atrial septal defect, S/P percutaneous closure     16. Mild aortic regurgitation and aortic valve sclerosis     17. Acute on chronic respiratory failure with hypoxia (HCC)     18. Pure hypercholesterolemia     19. Mild mitral regurgitation     20. Splenomegaly     21. Immunocompromised state (HCC)     22. Splenic lesion     23. Recurrent major depressive disorder, in remission (HCC)     24. History of small bowel obstruction     25. History of Clostridium difficile infection     26. Steroid-induced hyperglycemia     27. Pancytopenia (HCC)     28. Adrenal insufficiency (HCC)     29. History of infection with vancomycin resistant Enterococcus (VRE)     30. Incisional hernia, without obstruction or gangrene     31. Primary pulmonary hypertension (HCC)     32. Mixed obsessional thoughts and acts         Medical Decision Making: Today's Assessment/Status/Plan:      61-year-old female with hepatopulmonary pulmonary hypertension status post orthotopic liver transplant now with normal PA pressures on sildenafil and Latera's.    At this point I am more concerned about her weight loss.  I do not think that she is getting worse from pH standpoint as her PA pressures have been normal and she is relatively normal overall.  I am concerned that she will continue with weight.  We discussed weight loss strategies to keep continue to have her maintain her weight.  I also encouraged her to consider a weight gaining medication such as Megace.  She will let me know if she wants to start this.  I will see her back in 2 months.

## 2022-01-14 NOTE — PROGRESS NOTES
Chief Complaint   Patient presents with   • Follow-Up     Hypoxemia. 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 She has a very complex history. She has a history of biopsy-proven sarcoidosis. She also has a history of cirrhosis status post liver transplantation in December 2011 with chronic immunosuppression. Her medications include mycophenolate, tacrolimus, and prednisone. She has a history of hepatopulmonary syndrome with pulmonary hypertension and possible microvascular shunting. She has had an ASD repair that was done percutaneously. She has been hospitalized multiple times for various infections including pneumonia and tracheobronchitis. There is possibility that some of these events are due to microaspiration. She is on supplemental oxygen in the range of 5-6 L/m via nasal cannula. She has a history of complex sleep apnea. She has been treated with auto SV. She has a history of pulmonary hypertension with improvement on letairis and tadalafil. She has had previous bariatric surgery.  She was hospitalized at [facility]. She had bronchoscopy and eventually a wedge biopsy of the right lung which was initially interpreted at [facility] and [facility] as consistent with bronchiolitis obliterans. She has been followed by Dr. Arango at [facility] for her sarcoidosis. Her case was presented at a multidisciplinary conference and it was felt that the pathology was not consistent with bronchiolitis obliterans or any other interstitial lung disease. Dr Arango recommended to continue follow-up with pulmonary function testing and CT scanning as indicated.  A shunt evaluation at [facility] indicated a 14% shunt..     Because of the possibility of microaspiration she has been on Prilosec.   Remarkably she had had a CT scan ordered on January 19, 2018 to follow up previous infiltrates. Her outpatient scan on January 19  showed no evidence of significant pulmonary infiltrates. On January 20 the patient may have had another aspiration episode at night. She came to the emergency room with bilateral lower lobe pulmonary infiltrates and was admitted with a diagnosis of pneumonia and sepsis.  She has a history of a gastric sleeve. Because of her recurrent aspiration she had repeat surgery at [facility] by Dr. Jimenez on 3/19/18. She had a laparoscopic Evon-en-Y gastric bypass that was a conversion from her previous sleeve gastrectomy.   Since March she has lost over 30 pounds after the Evon and Y procedure.  She was hospitalized for relative hypotension necessitating reduction in some of her pulmonary hypertension medication.  Tadalafil was reduced to 20 mg per day and Lasix was reduced to 20 mg per day.  She continues on ambrisentan at 10 mg per day.  We reviewed her medications.  From a pulmonary perspective she remains on supplemental oxygen.  She has not had any further aspiration events after her surgery.   Her oxygen needs here in [place] on range of motion 5-6 L/m. When she does go to the [place] she was able to reduce her O2 supplementation to about 3 L/m. When she drives over [place] she finds that she needs to increase her oxygen to about 7 L/m to maintain adequate saturations.    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  She was  hospitalized several years ago with abdominal pain and found to be hypotensive.  She was treated with stress dose hydrocortisone and started on Florinef in addition to midodrine.  She has been on Lasix at times because of lower extremity edema.  She needs to be careful to avoid hypovolemia.  Mrs. Cuba has had a very difficult past several years.  I have not seen her since March 2019.  She traveled to [facility] and had difficulty returning because of COVID.  She tells me that while in [place] she had abdominal  pain which was felt to be a small bowel obstruction.  She had a laparotomy performed and tells me that she was told that no obstruction was noted.  She had a fall while still in [facility] and was noted to have bilateral subdural hematomas that were felt not to be surgical.  Upon arrival back in [place] she became short of breath and had headaches which led to a hospitalization and neurosurgery to drain her hematomas.  She was hospitalized for 2 weeks in August 2021.  After her surgery she did develop a seizure disorder.  She required readmission in September 2021 because of a fall that resulted in an elbow fracture.  At that time she had hypokalemia and Campylobacter gastroenteritis with diarrhea.  She developed pancytopenia which was evaluated by hematology.  She also was noted to have MGUS.  In December 2021 she was admitted to the hospital once again for shortness of breath and was found to have bilateral pleural effusions and mild volume overload.  At that time she restarted Lasix.  She responded to diuresis.    The patient has lost significant weight and appears gaunt.  She says she is having difficulty eating.  With eating her abdomen feels uncomfortable.  She does have a prior history of gastric surgery that required repair in [place] as noted above.  Her laboratory evaluation continues to show pancytopenia.  Her total protein is reduced at 5.6 g%.  Her albumin is 3.6 g%.  She does have a follow-up scheduled with Dr. Reza from hematology oncology.  Previously she has seen , gastroenterology.  She continues to be followed by Dr. Canada after her liver transplant.  She has been placed on antiseizure medication by neurology.  From a pulmonary standpoint she continues on supplemental oxygen.  Her CT scan when hospitalized demonstrated bilateral pleural effusions with possible mild pulmonary edema.  Her echocardiogram showed good left and right heart function.  No diastolic dysfunction was mentioned.   "RVSP could not be estimated.    Past Medical History:   Diagnosis Date   • Anemia    • Anesthesia     \"takes more to get me under, would rather be intubated\"    • Breath shortness     cont oxygen 5L (Preffered)   • Bronchitis      2016   • Cardiomegaly    • Chronic fatigue and malaise    • Chronic obstructive pulmonary disease (HCC)    • Cirrhosis (HCC) December 2011    Status post liver transplant at [facility]   • CKD (chronic kidney disease) stage 3, GFR 30-59 ml/min ([facility])    • Diabetes ([facility])     reports hx of, resolved w/weight loss. Reports still checking bloodsugars twice daily and using insulin PRN   • Fracture of left foot    • GERD (gastroesophageal reflux disease)         • H/O Clostridium difficile infection 02-17-17    reports \"16 months ago\". Current stool sample 01-26-17 neg   • Hemorrhagic disorder ([facility])     \"low platelets\" bruises easily    • Hiatus hernia syndrome    • High cholesterol    • Hypothyroid    • Jaundice 2009   • Liver transplanted ([facility])    • Low back pain 02-17-17    and left foot, 7/10   • Mild aortic regurgitation and aortic valve sclerosis     • On home oxygen therapy     5 liters, Dr. Gaming   • Platelet disorder ([facility])     low platelets   • Pneumonia     aspiration,    • Psychiatric disorder     Mood disorder   • Pulmonary hypertension ([facility])        • S/P cholecystectomy    • Small bowel obstruction ([facility])     01-   • Splenomegaly    • VRE (vancomycin-resistant Enterococci)     02-17-17, pt declines knowledge of this       ROS:   Constitutional: Denies fevers, chills, night sweats.positive for fatigue and weight loss  Eyes: Denies vision loss, pain, drainage, double vision  Ears, Nose, Throat: Denies earache, tinnitus, hoarseness  Cardiovascular: Denies chest pain, tightness, palpitations  Respiratory: Chronic shortness of breath and hypoxemia  Sleep: Complex sleep apnea  GI: See HPI  : Denies frequent urination, hematuria, painful urination  Musculoskeletal: Denies " back pain, painful joints, sore muscles  Neurological: See HPI  Skin: Denies rashes, color changes  Psychiatric: History of depression  Hematologic: Pancytopenia  Allergic/Immunologic: Denies rhinitis, skin sensitivity    Social History     Socioeconomic History   • Marital status:      Spouse name: Not on file   • Number of children: Not on file   • Years of education: Not on file   • Highest education level: Not on file   Occupational History   • Not on file   Tobacco Use   • Smoking status: Never Smoker   • Smokeless tobacco: Never Used   • Tobacco comment: avoid all tobacco products   Vaping Use   • Vaping Use: Never used   Substance and Sexual Activity   • Alcohol use: Not Currently     Alcohol/week: 0.0 oz   • Drug use: No   • Sexual activity: Not on file   Other Topics Concern   • Primary/coprimary nurse & associates Not Asked   • Family contact information Not Asked   • OK to release patient information to the following Not Asked   • Patient preferred routine/Privacy concerns Not Asked   • Patient likes and dislikes Not Asked   • Participating In Research Study Not Asked   • Miscellaneous Not Asked   Social History Narrative   • Not on file     Social Determinants of Health     Financial Resource Strain:    • Difficulty of Paying Living Expenses: Not on file   Food Insecurity:    • Worried About Running Out of Food in the Last Year: Not on file   • Ran Out of Food in the Last Year: Not on file   Transportation Needs:    • Lack of Transportation (Medical): Not on file   • Lack of Transportation (Non-Medical): Not on file   Physical Activity:    • Days of Exercise per Week: Not on file   • Minutes of Exercise per Session: Not on file   Stress:    • Feeling of Stress : Not on file   Social Connections:    • Frequency of Communication with Friends and Family: Not on file   • Frequency of Social Gatherings with Friends and Family: Not on file   • Attends [employer] Services: Not on file   • Active Member of  Clubs or Organizations: Not on file   • Attends Club or Organization Meetings: Not on file   • Marital Status: Not on file   Intimate Partner Violence:    • Fear of Current or Ex-Partner: Not on file   • Emotionally Abused: Not on file   • Physically Abused: Not on file   • Sexually Abused: Not on file   Housing Stability:    • Unable to Pay for Housing in the Last Year: Not on file   • Number of Places Lived in the Last Year: Not on file   • Unstable Housing in the Last Year: Not on file     Rhubarb, Organic nitrates, Other drug, and Nsaids  Current Outpatient Medications on File Prior to Visit   Medication Sig Dispense Refill   • fludrocortisone (FLORINEF) 0.1 MG Tab TAKE 2 TABLETS BY MOUTH EVERY DAY 60 Tablet 0   • furosemide (LASIX) 20 MG Tab Take 1 Tablet by mouth every day. Indications: Edema 30 Tablet 0   • potassium chloride SA (KDUR) 20 MEQ Tab CR Take 1 Tablet by mouth every day. 15 Tablet 0   • ambrisentan (LETAIRIS) 10 MG tablet TAKE 1 TABLET BY MOUTH EVERY DAY 30 Tablet 11   • gabapentin (NEURONTIN) 100 MG Cap Take 1 Capsule by mouth 2 times a day. 180 Capsule 1   • levothyroxine (SYNTHROID) 137 MCG Tab Take 1 Tablet by mouth every morning on an empty stomach. 90 Tablet 1   • Tadalafil, PAH, 20 MG Tab Take 20 mg by mouth every day.     • tacrolimus (PROGRAF) 1 MG Cap 4mg     • tadalafil (CIALIS) 20 MG tablet Take 1 Tablet by mouth every day. 90 Tablet 3   • lacosamide (VIMPAT) 100 MG Tab tablet Take 1 Tablet by mouth 2 times a day for 90 days. 60 Tablet 2   • levETIRAcetam (KEPPRA) 750 MG tablet Take 1 Tablet by mouth 2 times a day. 180 Tablet 2   • omeprazole (PRILOSEC) 40 MG delayed-release capsule Take 1 Capsule by mouth every day. 90 Capsule 3   • pravastatin (PRAVACHOL) 20 MG Tab Take 1 Tablet by mouth every day. 90 Tablet 3   • sertraline (ZOLOFT) 100 MG Tab TAKE 1 TABLET BY MOUTH EVERY DAY 90 Tablet 1   • ondansetron (ZOFRAN ODT) 4 MG TABLET DISPERSIBLE Take 1 Tablet by mouth every 8 hours as  "needed for Nausea. 30 Tablet 2   • traZODone (DESYREL) 50 MG Tab Take 1 Tablet by mouth at bedtime. 901 Tablet 3   • Home Care Oxygen Inhale 3 L/min continuous. Oxygen dose range: 3 L/min  Respiratory route via: Nasal Cannula   Oxygen supplier: Preferred         • mycophenolate (CELLCEPT) 250 MG Cap Take 250 mg by mouth 2 times a day. Indications: Liver Transplant Recipient     • oxyCODONE immediate release (ROXICODONE) 10 MG immediate release tablet Take 10 mg by mouth every 6 hours as needed for Moderate Pain.     • therapeutic multivitamin-minerals (THERAGRAN-M) Tab Take 1 tablet by mouth every day.       No current facility-administered medications on file prior to visit.     /60 (BP Location: Left arm, Patient Position: Sitting, BP Cuff Size: Adult)   Pulse 83   Ht 1.753 m (5' 9\")   Wt 62.8 kg (138 lb 7 oz)   SpO2 96%   Family History   Problem Relation Age of Onset   • Other Father         Unknown (dead 10 years)   • Diabetes Father    • Heart Disease Father    • Hypertension Father    • Hyperlipidemia Father    • Stroke Father    • Non-contributory Mother    • Hyperlipidemia Mother    • Alcohol/Drug Mother    • Cancer Paternal Aunt    • Alcohol/Drug Maternal Grandmother    • Alcohol/Drug Maternal Grandfather        Physical Exam:  Ill appearing on oxygen at rest  HEENT: PERRLA, EOMI, no scleral icterus, no nasal or oral lesions  Neck: No thyromegaly, no adenopathy, no bruits  Mallampatti: Grade II  Lungs: Equal breath sounds, no wheezes or crackles  Heart: Regular rate and rhythm, no gallops or murmurs  Abdomen: Soft, benign, no organomegaly  Extremities: She has bipedal pitting edema  Neurologic: Cranial nerve, motor, and sensory exam are normal    1. SOB (shortness of breath)    2. Hypoxemia    3. Hepatopulmonary syndrome (HCC)    4. Status post liver transplant Dr. Canada ([facility])    5. History of aspiration pneumonia    6. Immunocompromised state (HCC)    7. Ostium secundum type atrial " septal defect, S/P percutaneous closure    8. Pancytopenia (HCC)    9. S/P bariatric surgery    10. Sarcoidosis (HCC)        This woman has multiple medical problems as outlined above.  From a pulmonary standpoint she continues to need oxygen.  She does have small pleural effusions but excellent cardiac function.  Effusions may be due to to decreased protein levels.  She is due to follow-up with Dr. Reza for her pancytopenia.  I have suggested that she follow-up with Dr. Matt for her current GI distress.  She will also follow-up with Dr. Canada.  She will need continued follow-up with neurology.  We will see her back in 3 months or sooner if she has any significant change.

## 2022-01-23 PROBLEM — K52.9 ACUTE COLITIS: Status: ACTIVE | Noted: 2022-01-01

## 2022-01-23 NOTE — ED NOTES
Daniel from the lab phoned with a critical lab of a Lactic of 5.1, lab value repeated and ERp as well as primary RN notified.

## 2022-01-23 NOTE — ED NOTES
Med rec updated and complete, per  read list of medications to this writer  Allergies reviewed, per pt  Interviewed pt with  at bedside with permission from pt.

## 2022-01-23 NOTE — ASSESSMENT & PLAN NOTE
Secondary to liver transplant  Hold immunosuppression in the setting of active infection  Labs on a.m.

## 2022-01-23 NOTE — ASSESSMENT & PLAN NOTE
1 day history of worsening diarrhea nausea vomiting  Abdominal/pelvic CT with colitis mostly at cecum  History of C. difficile in the past, 5 years ago  History of Campylobacter colitis  History of antibiotic use  High risk as patient immunocompromise  Admit to observation  Keep n.p.o. for now  IV hydration  Hold off on antibiotics for now  Pending C. Difficile  Labs on AM

## 2022-01-23 NOTE — ED PROVIDER NOTES
"ED Provider Note    CHIEF COMPLAINT  Chief Complaint   Patient presents with   • Nausea/Vomiting/Diarrhea     vomiting and diarrhea started last night with abdominal pain       HPI  Roxana Cuba is a 61 y.o. female with a complicated past medical history including liver transplant, diabetes, chronic kidney disease, recent intracranial hemorrhage, COPD, C. difficile infection immunocompromise and pancytopenia who presents planing of nausea vomiting and diarrhea that started yesterday.  She states that there is mucus in her diarrhea.  She has been on antibiotics recently.  She states that she has a cough and this of breath but it is chronic.  She is on oxygen chronically 5 L.  She has not had a fever that she has noticed.  She has not been able to keep anything down since last night.  She describes the pain in her abdomen is dull achy and severe.  It is mostly in her periumbilical area.  Nothing seems to make it better or worse.  She also is complaining of headaches but has had chronic headaches since her intracranial hemorrhage.  He denies any unilateral weakness numbness or slurred speech.  She feels very weak and dehydrated.  She has had her gallbladder removed.    REVIEW OF SYSTEMS  HEENT:  No ear pain, congestion or sore throat   EYES: no discharge redness or vision changes  CARDIAC: no chest pain, palpitations    PULMONARY: Chronic dyspnea, cough or congestion   GI: Positive vomiting abdominal pain and diarrhea  : no dysuria, back pain or hematuria   Neuro: Generalized weakness, numbness aphasia or headache  Musculoskeletal: no swelling deformity or pain no joint swelling  Endocrine: no fevers, sweating, weight loss   SKIN: no rash, erythema or contusions     See history of present illness all other systems are negative    PAST MEDICAL HISTORY  Past Medical History:   Diagnosis Date   • Anemia    • Anesthesia     \"takes more to get me under, would rather be intubated\"    • Breath shortness     cont oxygen " "5L (Preffered)   • Bronchitis      2016   • Cardiomegaly    • Chronic fatigue and malaise    • Chronic obstructive pulmonary disease (HCC)    • Cirrhosis (HCC) December 2011    Status [place] [facility] [place]   • CKD (chronic kidney disease) stage 3, GFR 30-59 ml/min (HCC)    • Diabetes ([facility])     reports hx of, resolved w/weight loss. Reports still checking bloodsugars twice daily and using insulin PRN   • Fracture of left foot    • GERD (gastroesophageal reflux disease)         • H/O Clostridium difficile infection 02-17-17    reports \"16 months ago\". Current stool sample 01-26-17 neg   • Hemorrhagic disorder ([facility])     \"low platelets\" bruises easily    • Hiatus hernia syndrome    • High cholesterol    • Hypothyroid    • Jaundice 2009   • Liver transplanted ([facility])    • Low back pain 02-17-17    and left foot, 7/10   • Mild aortic regurgitation and aortic valve sclerosis     • On home oxygen therapy     5 liters, Dr. Gaming   • Platelet disorder ([facility])     low platelets   • Pneumonia     aspiration,    • Psychiatric disorder     Mood disorder   • Pulmonary hypertension ([facility])        • S/P cholecystectomy    • Small bowel obstruction (HCC)     01-   • Splenomegaly    • VRE (vancomycin-resistant Enterococci)     02-17-17, pt declines knowledge of this       FAMILY HISTORY  Family History   Problem Relation Age of Onset   • Other Father         Unknown (dead 10 years)   • Diabetes Father    • Heart Disease Father    • Hypertension Father    • Hyperlipidemia Father    • Stroke Father    • Non-contributory Mother    • Hyperlipidemia Mother    • Alcohol/Drug Mother    • Cancer Paternal Aunt    • Alcohol/Drug Maternal Grandmother    • Alcohol/Drug Maternal Grandfather        SOCIAL HISTORY  Social History     Socioeconomic History   • Marital status:      Spouse name: Not on file   • Number of children: Not on file   • Years of education: Not on file   • Highest education level: Not on file "   Occupational History   • Not on file   Tobacco Use   • Smoking status: Never Smoker   • Smokeless tobacco: Never Used   • Tobacco comment: avoid all tobacco products   Vaping Use   • Vaping Use: Never used   Substance and Sexual Activity   • Alcohol use: Not Currently     Alcohol/week: 0.0 oz   • Drug use: No   • Sexual activity: Not on file   Other Topics Concern   • Primary/coprimary nurse & associates Not Asked   • Family contact information Not Asked   • OK to release patient information to the following Not Asked   • Patient preferred routine/Privacy concerns Not Asked   • Patient likes and dislikes Not Asked   • Participating In Research Study Not Asked   • Miscellaneous Not Asked   Social History Narrative   • Not on file     Social Determinants of Health     Financial Resource Strain:    • Difficulty of Paying Living Expenses: Not on file   Food Insecurity:    • Worried About Running Out of Food in the Last Year: Not on file   • Ran Out of Food in the Last Year: Not on file   Transportation Needs:    • Lack of Transportation (Medical): Not on file   • Lack of Transportation (Non-Medical): Not on file   Physical Activity:    • Days of Exercise per Week: Not on file   • Minutes of Exercise per Session: Not on file   Stress:    • Feeling of Stress : Not on file   Social Connections:    • Frequency of Communication with Friends and Family: Not on file   • Frequency of Social Gatherings with Friends and Family: Not on file   • Attends Baptism Services: Not on file   • Active Member of Clubs or Organizations: Not on file   • Attends Club or Organization Meetings: Not on file   • Marital Status: Not on file   Intimate Partner Violence:    • Fear of Current or Ex-Partner: Not on file   • Emotionally Abused: Not on file   • Physically Abused: Not on file   • Sexually Abused: Not on file   Housing Stability:    • Unable to Pay for Housing in the Last Year: Not on file   • Number of Places Lived in the Last Year:  Not on file   • Unstable Housing in the Last Year: Not on file       SURGICAL HISTORY  Past Surgical History:   Procedure Laterality Date   • CRANIOTOMY Left 8/4/2021    Procedure: CRANIOTOMY;  Surgeon: Tramaine Arnold III, M.D.;  Location: SURGERY [facility];  Service: Neurosurgery   • VENTRAL HERNIA REPAIR ROBOTIC XI N/A 11/12/2018    Procedure: VENTRAL HERNIA REPAIR ROBOTIC XI- FOR EPIGASTRIC INCISIONAL;  Surgeon: John H Ganser, M.D.;  Location: SURGERY [facility];  Service: General   • TURBINATE REDUCTION Bilateral 10/4/2018    Procedure: TURBINATE REDUCTION;  Surgeon: Silviano Erazo M.D.;  Location: SURGERY SAME DAY [facility];  Service: Ent   • NASAL RECONSTRUCTION Bilateral 10/4/2018    Procedure: NASAL RECONSTRUCTION- LATERA IMPLANTS;  Surgeon: Silviano Erazo M.D.;  Location: SURGERY SAME DAY [facility];  Service: Ent   • OTHER  12/11/2017    paniculectomy   • COLONOSCOPY N/A 3/27/2017    Procedure: COLONOSCOPY;  Surgeon: Osman Matt M.D.;  Location: SURGERY SAME DAY [facility];  Service:    • GASTROSCOPY N/A 3/27/2017    Procedure: GASTROSCOPY;  Surgeon: Osman Matt M.D.;  Location: SURGERY SAME DAY [facility];  Service:    • BREAST BIOPSY Left 2/21/2017    Procedure: BREAST BIOPSY - WIRE LOCALIZED EXCISONAL ;  Surgeon: Jane Zhao M.D.;  Location: SURGERY SAME DAY [facility];  Service:    • LUNG BIOPSY OPEN  11/2016   • OTHER ABDOMINAL SURGERY      Gasric Sleeve   • BONE MARROW ASPIRATION  3/16/2015    Performed by Freddie Hi M.D. at ENDOSCOPY [facility]   • BONE MARROW BIOPSY, NDL/TROCAR  3/16/2015    Performed by Freddie Hi M.D. at ENDOSCOPY [facility]   • RECOVERY  3/31/2014    Performed by [facility] at SURGERY [facility]   • RECOVERY  3/24/2014    Performed by [facility] at SURGERY SAME DAY [facility]   • RECOVERY  1/21/2014    Performed by [facility] at SURGERY SAME DAY [facility]   • BRONCHOSCOPY-ENDO   11/15/2013    Performed by Ha Perez M.D. at ENDOSCOPY [facility]   • RECOVERY  2/27/2013    Performed by [facility] at SURGERY SAME DAY [facility]   • BONE MARROW ASPIRATION  12/31/2012    Performed by Josemanuel Real M.D. at ENDOSCOPY [facility]   • BONE MARROW BIOPSY, NDL/TROCAR  12/31/2012    Performed by Josemanuel Real M.D. at ENDOSCOPY [facility]   • GASTROSCOPY  9/28/2012    Performed by Aravind Richards M.D. at SURGERY [facility]   • SIGMOIDOSCOPY FLEX  9/26/2012    Performed by Kristopher Cardoso M.D. at ENDOSCOPY [facility]   • BRONCHOSCOPY-ENDO  6/19/2012    Performed by MARLENA MURILLO at ENDOSCOPY [facility]   • BRONCHOSCOPY-ENDO  5/29/2012    Performed by SUYAPA CAMARENA at ENDOSCOPY [facility]   • OTHER ABDOMINAL SURGERY  December 2011    [place] [facility] [place].   • GASTROSCOPY-ENDO  3/12/2010    Performed by CAMELIA SAMANO at ENDOSCOPY [facility]   • EXAM UNDER ANESTHESIA  11/11/2009    Performed by BIRD ABDI at SURGERY [facility]   • HEMORRHOIDECTOMY  11/11/2009    Performed by BIRD ABDI at SURGERY [facility]   • KELBY BY LAPAROSCOPY  9/19/2009    Performed by BIRD ABDI at SURGERY [facility]   • CARPAL TUNNEL RELEASE          • KELBY BY LAPAROSCOPY     • GASTRIC BYPASS LAPAROSCOPIC     • HERNIA REPAIR      x3   • HYSTERECTOMY, TOTAL ABDOMINAL          • OTHER      Breast reduction   • OTHER      liver transplant   • WI ANESTH,NOSE,SINUS SURGERY      x4   • TONSILLECTOMY         CURRENT MEDICATIONS  Home Medications     Reviewed by Colleen Jeffery (Pharmacy Tech) on 01/23/22 at 1140  Med List Status: Complete   Medication Last Dose Status   albuterol (PROAIR HFA) 108 (90 Base) MCG/ACT Aero Soln inhalation aerosol 1/22/2022 Active   ambrisentan (LETAIRIS) 10 MG tablet 1/23/2022 Active   fludrocortisone (FLORINEF) 0.1 MG Tab 1/23/2022 Active   furosemide (LASIX) 20 MG Tab  "1/23/2022 Active   gabapentin (NEURONTIN) 100 MG Cap 1/23/2022 Active   Home Care Oxygen CONTINUOUS Active   lacosamide (VIMPAT) 100 MG Tab tablet 1/23/2022 Active   levETIRAcetam (KEPPRA) 750 MG tablet 1/23/2022 Active   levothyroxine (SYNTHROID) 137 MCG Tab 1/23/2022 Active   mycophenolate (CELLCEPT) 250 MG Cap 1/23/2022 Active   omeprazole (PRILOSEC) 40 MG delayed-release capsule 1/23/2022 Active   ondansetron (ZOFRAN ODT) 4 MG TABLET DISPERSIBLE 1/22/2022 Active   oxyCODONE immediate release (ROXICODONE) 10 MG immediate release tablet > 2 days Active   potassium chloride SA (KDUR) 20 MEQ Tab CR 1/23/2022 Active   pravastatin (PRAVACHOL) 20 MG Tab 1/23/2022 Active   sertraline (ZOLOFT) 100 MG Tab 1/22/2022 Active   tacrolimus (PROGRAF) 1 MG Cap 1/23/2022 Active   tadalafil (CIALIS) 20 MG tablet 1/23/2022 Active   therapeutic multivitamin-minerals (THERAGRAN-M) Tab 1/22/2022 Active   traZODone (DESYREL) 50 MG Tab 1/22/2022 Active                ALLERGIES  Allergies   Allergen Reactions   • Rhubarb Anaphylaxis   • Organic Nitrates Unspecified     Nitroglycerin products should be avoided with the use of PDE-5 inhibitors as the combination can result in severe hypotension.  RD clarified with pt: Nitrates in food are okay and pt does not want nitrates to be listed as food allergy. Pt only avoids medications with nitrates.    • Other Drug      Any medication that may interact with cyclosporine, tacrolimus, sirolimus, or prograf (due to hx of liver transplant)    • Nsaids Unspecified     Can not take due to hx of liver transplant        PHYSICAL EXAM  VITAL SIGNS: /64   Pulse 86   Temp 37.7 °C (99.9 °F) (Temporal)   Resp (!) 23   Ht 1.753 m (5' 9\")   Wt 60 kg (132 lb 4.4 oz)   LMP 01/03/2000   SpO2 100%   BMI 19.53 kg/m²   Constitutional: Thin and chronically ill-appearing  HEENT: Normocephalic, Atraumatic,  external ears normal, pharynx pink,  Mucous  Membranes dry, No rhinorrhea or mucosal edema  Eyes: " PERRL, EOMI, Conjunctiva normal, No discharge.   Neck: Normal range of motion, No tenderness, Supple, No stridor.   Lymphatic: No lymphadenopathy    Cardiovascular: Regular Rate and Rhythm, No murmurs,  rubs, or gallops.   Thorax & Lungs: Lungs clear to auscultation bilaterally, No respiratory distress, No wheezes, rhales or rhonchi, No chest wall tenderness.   Abdomen: Abdomen is soft and acutely tender to palpation diffusely.  There is no rebound masses or peritoneal signs  Skin: Warm, Dry, No erythema, No rash,   Back:  No CVA tenderness,  No spinal tenderness, bony crepitance step offs or instability.   Extremities: Equal, intact distal pulses, No cyanosis, clubbing or edema,  No tenderness.   Musculoskeletal: Good range of motion in all major joints. No tenderness to palpation or major deformities noted.   Neurologic: Alert & oriented x 3, Cranial nerves II-XII intact, Equal strength and sensation upper and lower extremities bilaterally,  No focal deficits noted.   Psychiatric: Affect normal, Judgment normal, Mood normal.      RADIOLOGY/PROCEDURES  CT-ABDOMEN-PELVIS WITH   Final Result         1. Wall thickening in the colon, most in the cecum and normal colon, likely colitis.   2. Hepatic steatosis.   3. Small right pleural effusion.            COURSE & MEDICAL DECISION MAKING  Pertinent Labs & Imaging studies reviewed. (See chart for details)  Differential diagnosis: Diverticulitis, C. difficile colitis, appendicitis, ileus, dehydration, worsening kidney failure    HYDRATION: Based on the patient's presentation of Acute Diarrhea the patient was given IV fluids. IV Hydration was used because oral hydration was not adequate alone. Upon recheck following hydration, the patient was improved.      10:30 AM An IV was started and labs were drawn.  To give the patient Dilaudid and Zofran for pain and nausea.  CT has been ordered.  Also gave her IV fluid to help rehydrate her.    12:26 PM after IV fluids pain and  nausea medicine the patient feels and looks improved.  Her CT is positive for thickening in the colon wall.  I am concerned for C. difficile infection and we will give her p.o. vancomycin.  The patient is  to palpation of her abdomen and I will admit her to the hospitalist for IV fluids pain control and to get the antibiotics started.  She understands and is agreeable to stay.    12:50pm  I spoke with the hospitalist Dr. Atwood who has accepted the patient for admission.    Patient has had high blood pressure while in the emergency department, felt likely secondary to medical condition. Counseled patient to monitor blood pressure at home and follow up with primary care physician.    Critical Care  Due to the real possibility of a deterioration of this patient's condition required the highest level of my preparedness for sudden emergent intervention. I provided critical care services which included medication orders, frequent reevaluations of the patient's condition and response to treatment, ordering and reviewing test results and discussing the case with various consultants. The critical care time associated with the care of the patient was already 5 minutes. Review chart for interventions. This time is exclusive of any other billable procedures.     FINAL IMPRESSION  1. Nausea vomiting and diarrhea    2. Dehydration    3. Colitis    4. SIRS (systemic inflammatory response syndrome) (HCC)           PLAN/DISPOSITION  Admitted in guarded condition          Electronically signed by: Rebecca Goldman M.D., 1/23/2022 10:15 AM

## 2022-01-23 NOTE — H&P
Hospital Medicine History & Physical Note    Date of Service  1/23/2022    Primary Care Physician  Elisa Phillip M.D.    Consultants  None    Code Status  Full Code    Chief Complaint  Chief Complaint   Patient presents with   • Nausea/Vomiting/Diarrhea     vomiting and diarrhea started last night with abdominal pain       History of Presenting Illness  61-year-old female with a past medical history of hepatopulmonary hypertension, status post ASD repair, chronic hypoxia on 5 L O2, cirrhosis s/p liver transplant 2011 on immunosuppressants, pancytopenia, hypothyroidism, MGUS, depression, recent subdural hematoma secondary to TBI causing seizures, dyslipidemia, GERD, adrenal insufficiency, chronic pain, history of gastric sleeve surgery with recurrent aspiration and repeat surgery 2018 presented to the emergency department on 1/23/2022 with nausea vomiting diarrhea and abdominal pain that started last night.  Patient reporting that she was feeling well last night and ate to [employer] tac[employer], minutes or an hour later she started having nausea vomiting and had multiple bouts of loose bowel movements.  Patient states that she was awake all night having bowel movements which resolved in this morning shortly prior to presenting to emergency department.  She is still feeling nauseous/queasy and has not had anything to eat since last night.  No fever, chills, night sweats.  She reports that last time she took antibiotics was on 12/25/2021.  No fever, chills, orthostatic changes or loss of consciousness.    At the emergency department, vital signs stable vitals.  Patient requiring 4 L nasal cannula to maintain saturations.  CBC with normocytic anemia at 10.6 which is higher than her baseline.  CMP without gross abnormalities.  Lactic acid at 5.1.  Viral panel negative and UA clean.  Abdominal/pelvic CT with contrast showing wall thickening of the colon which is mostly in the cecum and sigmoid, suggestive of colitis.  No bowel  movements in the ED.  She received 1 dose of metronidazole and vancomycin in the ED. patient was admitted for colitis supportive management and monitoring    I discussed the plan of care with patient.    Review of Systems  Review of Systems   Constitutional: Positive for malaise/fatigue.   HENT: Negative.    Eyes: Negative.    Respiratory: Negative.    Cardiovascular: Negative.    Gastrointestinal: Positive for abdominal pain, diarrhea and nausea. Negative for vomiting.   Genitourinary: Negative.    Musculoskeletal: Negative.    Skin: Negative.    Neurological: Positive for weakness.   Endo/Heme/Allergies: Negative.    Psychiatric/Behavioral: Negative.        Past Medical History   has a past medical history of Anemia, Anesthesia, Breath shortness, Bronchitis ( ), Cardiomegaly, Chronic fatigue and malaise, Chronic obstructive pulmonary disease ([facility]), Cirrhosis ([facility]) (December 2011), CKD (chronic kidney disease) stage 3, GFR 30-59 ml/min ([facility]), Diabetes ([facility]), Fracture of left foot, GERD (gastroesophageal reflux disease), H/O Clostridium difficile infection (02-17-17), Hemorrhagic disorder (HCC), Hiatus hernia syndrome, High cholesterol, Hypothyroid, Jaundice (2009), Liver transplanted ([facility]), Low back pain (02-17-17), Mild aortic regurgitation and aortic valve sclerosis ( ), On home oxygen therapy, Platelet disorder ([facility]), Pneumonia, Psychiatric disorder, Pulmonary hypertension ([facility]), S/P cholecystectomy, Small bowel obstruction ([facility]), Splenomegaly, and VRE (vancomycin-resistant Enterococci).    Surgical History   has a past surgical history that includes pr anesth,nose,sinus surgery; makayla by laparoscopy (9/19/2009); exam under anesthesia (11/11/2009); hysterectomy, total abdominal; gastroscopy-endo (3/12/2010); bronchoscopy-endo (5/29/2012); bronchoscopy-endo (6/19/2012); sigmoidoscopy flex (9/26/2012); recovery (2/27/2013); bronchoscopy-endo (11/15/2013); recovery (1/21/2014); recovery (3/24/2014);  hemorrhoidectomy (11/11/2009); gastroscopy (9/28/2012); carpal tunnel release; bone marrow aspiration (12/31/2012); bone marrow biopsy, ndl/trocar (12/31/2012); recovery (3/31/2014); other abdominal surgery (December 2011); bone marrow aspiration (3/16/2015); bone marrow biopsy, ndl/trocar (3/16/2015); lung biopsy open (11/2016); tonsillectomy; other abdominal surgery (); breast biopsy (Left, 2/21/2017); colonoscopy (N/A, 3/27/2017); gastroscopy (N/A, 3/27/2017); gastric bypass laparoscopic; hernia repair; makayla by laparoscopy; other; other (12/11/2017); other; turbinate reduction (Bilateral, 10/4/2018); nasal reconstruction (Bilateral, 10/4/2018); ventral hernia repair robotic xi (N/A, 11/12/2018); and craniotomy (Left, 8/4/2021).     Family History  family history includes Alcohol/Drug in her maternal grandfather, maternal grandmother, and mother; Cancer in her paternal aunt; Diabetes in her father; Heart Disease in her father; Hyperlipidemia in her father and mother; Hypertension in her father; Non-contributory in her mother; Other in her father; Stroke in her father.     Social History   reports that she has never smoked. She has never used smokeless tobacco. She reports previous alcohol use. She reports that she does not use drugs.    Allergies  Allergies   Allergen Reactions   • Rhubarb Anaphylaxis   • Organic Nitrates Unspecified     Nitroglycerin products should be avoided with the use of PDE-5 inhibitors as the combination can result in severe hypotension.  RD clarified with pt: Nitrates in food are okay and pt does not want nitrates to be listed as food allergy. Pt only avoids medications with nitrates.    • Other Drug      Any medication that may interact with cyclosporine, tacrolimus, sirolimus, or prograf (due to hx of liver transplant)    • Nsaids Unspecified     Can not take due to hx of liver transplant        Medications  Prior to Admission Medications   Prescriptions Last Dose Informant  Patient Reported? Taking?   Home Care Oxygen CONTINUOUS Significant Other Yes No   Sig: Inhale 3-5 L/min continuous. Oxygen dose range: 3 L/min  Respiratory route via: Nasal Cannula   Oxygen supplier: Preferred       albuterol (PROAIR HFA) 108 (90 Base) MCG/ACT Aero Soln inhalation aerosol 1/22/2022 at 2100 Significant Other No No   Sig: Inhale 2 Puffs every four hours as needed for Shortness of Breath (wheezing).   ambrisentan (LETAIRIS) 10 MG tablet 1/23/2022 at 0730 Significant Other No No   Sig: TAKE 1 TABLET BY MOUTH EVERY DAY   fludrocortisone (FLORINEF) 0.1 MG Tab 1/23/2022 at 0730 Significant Other Yes Yes   Sig: Take 0.1 mg by mouth every day.   furosemide (LASIX) 20 MG Tab 1/23/2022 at 0730 Significant Other No No   Sig: Take 1 Tablet by mouth every day. Indications: Edema   gabapentin (NEURONTIN) 100 MG Cap 1/23/2022 at 0730 Significant Other No No   Sig: Take 1 Capsule by mouth 2 times a day.   lacosamide (VIMPAT) 100 MG Tab tablet 1/23/2022 at 0730 Significant Other No No   Sig: Take 1 Tablet by mouth 2 times a day for 90 days.   levETIRAcetam (KEPPRA) 750 MG tablet 1/23/2022 at 0730 Significant Other No No   Sig: Take 1 Tablet by mouth 2 times a day.   levothyroxine (SYNTHROID) 137 MCG Tab 1/23/2022 at 0730 Significant Other No No   Sig: Take 1 Tablet by mouth every morning on an empty stomach.   mycophenolate (CELLCEPT) 250 MG Cap 1/23/2022 at 0730 Significant Other Yes No   Sig: Take 250 mg by mouth 2 times a day. Indications: Liver Transplant Recipient   omeprazole (PRILOSEC) 40 MG delayed-release capsule 1/23/2022 at 0730 Significant Other No No   Sig: Take 1 Capsule by mouth every day.   ondansetron (ZOFRAN ODT) 4 MG TABLET DISPERSIBLE 1/22/2022 at 1200 Significant Other No No   Sig: Take 1 Tablet by mouth every 8 hours as needed for Nausea.   oxyCODONE immediate release (ROXICODONE) 10 MG immediate release tablet > 2 days at Ran out Significant Other Yes No   Sig: Take 10 mg by mouth every 6  hours as needed for Moderate Pain.   potassium chloride SA (KDUR) 20 MEQ Tab CR 1/23/2022 at 0730 Significant Other No No   Sig: Take 1 Tablet by mouth every day.   pravastatin (PRAVACHOL) 20 MG Tab 1/23/2022 at 0730 Significant Other No No   Sig: Take 1 Tablet by mouth every day.   sertraline (ZOLOFT) 100 MG Tab 1/22/2022 at 2000 Significant Other No No   Sig: Take 1 Tablet by mouth every day.   Patient taking differently: Take 100 mg by mouth every evening.   tacrolimus (PROGRAF) 1 MG Cap 1/23/2022 at 0730 Significant Other Yes No   Sig: Take 2 mg by mouth 2 times a day.   tadalafil (CIALIS) 20 MG tablet 1/23/2022 at 0730 Significant Other No No   Sig: Take 1 Tablet by mouth every day.   therapeutic multivitamin-minerals (THERAGRAN-M) Tab 1/22/2022 at 1800 Significant Other Yes No   Sig: Take 1 Tablet by mouth every evening.   traZODone (DESYREL) 50 MG Tab 1/22/2022 at 2000 Significant Other No No   Sig: Take 1 Tablet by mouth at bedtime.      Facility-Administered Medications: None       Physical Exam  Temp:  [37.1 °C (98.7 °F)] 37.1 °C (98.7 °F)  Pulse:  [71-85] 79  Resp:  [13-46] 46  BP: (129-155)/(57-70) 129/57  SpO2:  [92 %-100 %] 98 %  Blood Pressure: 148/67   Temperature: 37.1 °C (98.7 °F)   Pulse: 79   Respiration: 13   Pulse Oximetry: 100 %       Physical Exam  Constitutional:       Appearance: Normal appearance. She is not ill-appearing.   HENT:      Head: Normocephalic and atraumatic.      Mouth/Throat:      Mouth: Mucous membranes are moist.   Eyes:      Extraocular Movements: Extraocular movements intact.      Pupils: Pupils are equal, round, and reactive to light.   Cardiovascular:      Rate and Rhythm: Normal rate and regular rhythm.      Pulses: Normal pulses.      Heart sounds: Normal heart sounds.   Pulmonary:      Effort: Pulmonary effort is normal. No respiratory distress.      Breath sounds: Normal breath sounds.   Abdominal:      General: Bowel sounds are normal. There is no distension.       Palpations: Abdomen is soft.      Tenderness: There is abdominal tenderness. There is no guarding or rebound.      Comments: Surgical scar   Musculoskeletal:         General: No swelling. Normal range of motion.      Cervical back: Normal range of motion and neck supple.      Right lower leg: No edema.      Left lower leg: No edema.   Skin:     General: Skin is warm.      Coloration: Skin is not jaundiced.   Neurological:      General: No focal deficit present.      Mental Status: She is alert and oriented to person, place, and time. Mental status is at baseline.      Cranial Nerves: No cranial nerve deficit.   Psychiatric:         Mood and Affect: Mood normal.         Behavior: Behavior normal.         Thought Content: Thought content normal.         Judgment: Judgment normal.         Laboratory:  Recent Labs     01/23/22  1045   WBC 4.8   RBC 4.13*   HEMOGLOBIN 10.6*   HEMATOCRIT 34.8*   MCV 84.3   MCH 25.7*   MCHC 30.5*   RDW 45.6   PLATELETCT 123*   MPV 9.9     Recent Labs     01/23/22  1045   SODIUM 139   POTASSIUM 3.8   CHLORIDE 93*   CO2 21   GLUCOSE 84   BUN 11   CREATININE 0.56   CALCIUM 9.0     Recent Labs     01/23/22  1045   ALTSGPT 22   ASTSGOT 29   ALKPHOSPHAT 70   TBILIRUBIN 0.5   GLUCOSE 84         No results for input(s): NTPROBNP in the last 72 hours.      No results for input(s): TROPONINT in the last 72 hours.    Imaging:  CT-ABDOMEN-PELVIS WITH   Final Result         1. Wall thickening in the colon, most in the cecum and normal colon, likely colitis.   2. Hepatic steatosis.   3. Small right pleural effusion.        Assessment/Plan:  I anticipate this patient is appropriate for observation status at this time.    * Acute colitis- (present on admission)  Assessment & Plan  1 day history of worsening diarrhea nausea vomiting  Abdominal/pelvic CT with colitis mostly at cecum  History of C. difficile in the past, 5 years ago  History of Campylobacter colitis  History of antibiotic use  High risk as  patient immunocompromise  Admit to observation  Keep n.p.o. for now  IV hydration  Hold off on antibiotics for now  Pending C. Difficile  Labs on AM    Immunocompromised state (HCC)- (present on admission)  Assessment & Plan  Secondary to liver transplant  Hold immunosuppression in the setting of active infection  Labs on a.m.    Seizures (HCC)- (present on admission)  Assessment & Plan  Continue home Keppra and Vimpat    Primary pulmonary hypertension (HCC)- (present on admission)  Assessment & Plan  Continue home Ambrisentan and Cialis    GERD (gastroesophageal reflux disease)- (present on admission)  Assessment & Plan  Continue home PPI    Chronic respiratory failure (HCC)- (present on admission)  Assessment & Plan  Saturating well on baseline oxygen  Monitor    Primary insomnia- (present on admission)  Assessment & Plan  Continue home trazodone    DUC (generalized anxiety disorder)- (present on admission)  Assessment & Plan  Continue home SSRI    Acquired hypothyroidism- (present on admission)  Assessment & Plan  Continue home levothyroxine      VTE prophylaxis: enoxaparin ppx

## 2022-01-24 NOTE — CARE PLAN
Problem: Nutritional:  Goal: Achieve adequate nutritional intake  Description: Patient will consume >50% of meals  Outcome: Not Met   See RD note.

## 2022-01-24 NOTE — CARE PLAN
The patient is Stable - Low risk of patient condition declining or worsening    Shift Goals  Clinical Goals: pain cont  Patient Goals: pain cnt    Progress made toward(s) clinical / shift goals:  yes        Problem: Pain - Standard  Goal: Alleviation of pain or a reduction in pain to the patient’s comfort goal  Outcome: Met     Problem: Knowledge Deficit - Standard  Goal: Patient and family/care givers will demonstrate understanding of plan of care, disease process/condition, diagnostic tests and medications  Outcome: Met     Problem: Fall Risk  Goal: Patient will remain free from falls  Outcome: Met     Problem: Skin Integrity  Goal: Skin integrity is maintained or improved  Outcome: Met     Problem: Skin Integrity  Goal: Skin integrity is maintained or improved  Outcome: Met

## 2022-01-24 NOTE — DISCHARGE INSTRUCTIONS
Discharge Instructions    Discharged to home by car with relative. Discharged via wheelchair, hospital escort: Refused.  Special equipment needed: Walker    Be sure to schedule a follow-up appointment with your primary care doctor or any specialists as instructed.     Discharge Plan:   Influenza Vaccine Indication: Not indicated: Previously immunized this influenza season and > 8 years of age    I understand that a diet low in cholesterol, fat, and sodium is recommended for good health. Unless I have been given specific instructions below for another diet, I accept this instruction as my diet prescription.   Other diet:reg      Special Instructions: None    · Is patient discharged on Warfarin / Coumadin?   No     Depression / Suicide Risk    As you are discharged from this [facility] facility, it is important to learn how to keep safe from harming yourself.    Recognize the warning signs:  · Abrupt changes in personality, positive or negative- including increase in energy   · Giving away possessions  · Change in eating patterns- significant weight changes-  positive or negative  · Change in sleeping patterns- unable to sleep or sleeping all the time   · Unwillingness or inability to communicate  · Depression  · Unusual sadness, discouragement and loneliness  · Talk of wanting to die  · Neglect of personal appearance   · Rebelliousness- reckless behavior  · Withdrawal from people/activities they love  · Confusion- inability to concentrate     If you or a loved one observes any of these behaviors or has concerns about self-harm, here's what you can do:  · Talk about it- your feelings and reasons for harming yourself  · Remove any means that you might use to hurt yourself (examples: pills, rope, extension cords, firearm)  · Get professional help from the community (Mental Health, Substance Abuse, psychological counseling)  · Do not be alone:Call your Safe Contact- someone whom you trust who will be there for  you.  · Call your local CRISIS HOTLINE 790-1493 or 657-995-4771  · Call your local Children's Mobile Crisis Response Team [place] (505) 455-0229 or www.FREECULTR  · Call the toll free National Suicide Prevention Hotlines   · National Suicide Prevention Lifeline 983-152-JMMM (1591)  · National [employer] Line Network 800-SUICIDE (682-6062)

## 2022-01-24 NOTE — PROGRESS NOTES
Bedside report received from Jovani FRANCIS and assumed Pt's cares. Call light within reach. Safety measures in place.

## 2022-01-24 NOTE — TELEPHONE ENCOUNTER
Received request via: Pharmacy    Was the patient seen in the last year in this department? Yes  LOV : 12/30/2021  Does the patient have an active prescription (recently filled or refills available) for medication(s) requested? No

## 2022-01-24 NOTE — DISCHARGE PLANNING
Anticipated Discharge Disposition:   Home     Action:   Chart review complete     Discussed patient during rounds. Patient expected to discharge today.      No anticipated needs at this time.     RN CM will continue to follow.      Barriers to Discharge:   None     Plan:   HCM will continue to follow and assist with discharge needs.

## 2022-01-24 NOTE — DIETARY
"Nutrition services: Day 0 of admit.  Roxana Cuba is a 61 y.o. female with admitting DX of acute colitis    Consult received for MST of 3 on nutrition screen due to report of 14-23 lb wt loss x 1 month and poor PO PTA.      Assessment:  Height: 175.3 cm (5' 9\")  Weight: 60 kg (132 lb 4.4 oz)(wheelchair scale)  Body mass index is 19.53 kg/m²., BMI classification: WNL  Diet/Intake: clear liquids-advanced to full liquids.     Evaluation:   1. Report of nausea/vomiting/diarrhea. Symptoms started the night of 1/22-1/23.   2. Pt with hx of liver transplant with immunosuppression, HTN, GERD, chronic respiratory failure, gastric sleeve surgery.  3. RD visited pt in her room. Pt appeared ill during RD visit. Pt said that she has lost 17 lb (11%) x 1 month due to PO intake that was < 50% of normal. When asked what caused the poor PO intake, she said that she has been \"out of whack\" due to her hx of liver transplant and hematoma due to TBI. She did not attribute the poor PO as starting when she developed colitis. Pt reports that she dislikes Boost and Ensure supplements. She said that they make her nauseas. She agreed to try Ch[employer]ani Smoothies with her meals TID. She is looking forward to her new full liquid diet. She has not eaten for days and she said that she is hungry.     Malnutrition Risk: pt meets criteria for severe malnutrition in the context of acute illness related to recent diminished appetite due to not feeling well AEB report of 11% wt loss x 1 month and PO < 50% of normal x 1 month    Recommendations/Plan:  1. Add Chobani Smoothies to meals TID   2. Encourage intake of >50%  3. Document intake of all meals as % taken in ADL's to provide interdisciplinary communication across all shifts.   4. Monitor weight.  5. Nutrition rep will continue to see patient for ongoing meal and snack preferences.     RD will follow.          "

## 2022-01-24 NOTE — DISCHARGE SUMMARY
"Discharge Summary    CHIEF COMPLAINT ON ADMISSION  Chief Complaint   Patient presents with   • Nausea/Vomiting/Diarrhea     vomiting and diarrhea started last night with abdominal pain       Reason for Admission  Vomiting, Stomach Pains     Admission Date  1/23/2022    CODE STATUS  Full Code    HPI & HOSPITAL COURSE  Per notes, \"61-year-old female with a past medical history of hepatopulmonary hypertension, status post ASD repair, chronic hypoxia on 5 L O2, cirrhosis s/p liver transplant 2011 on immunosuppressants, pancytopenia, hypothyroidism, MGUS, depression, recent subdural hematoma secondary to TBI causing seizures, dyslipidemia, GERD, adrenal insufficiency, chronic pain, history of gastric sleeve surgery with recurrent aspiration and repeat surgery 2018 presented to the emergency department on 1/23/2022 with nausea vomiting diarrhea and abdominal pain that started last night.  Patient reporting that she was feeling well last night and ate to [employer] tac[employer], minutes or an hour later she started having nausea vomiting and had multiple bouts of loose bowel movements.  Patient states that she was awake all night having bowel movements which resolved in this morning shortly prior to presenting to emergency department.  She is still feeling nauseous/queasy and has not had anything to eat since last night.  No fever, chills, night sweats.  She reports that last time she took antibiotics was on 12/25/2021.  No fever, chills, orthostatic changes or loss of consciousness.     At the emergency department, vital signs stable vitals.  Patient requiring 4 L nasal cannula to maintain saturations.  CBC with normocytic anemia at 10.6 which is higher than her baseline.  CMP without gross abnormalities.  Lactic acid at 5.1.  Viral panel negative and UA clean.  Abdominal/pelvic CT with contrast showing wall thickening of the colon which is mostly in the cecum and sigmoid, suggestive of colitis.  No bowel movements in the ED.  She " "received 1 dose of metronidazole and vancomycin in the ED. patient was admitted for colitis supportive management and monitoring\"    Patient was admitted and monitored overnight for volume depletion and diarrhea.  She had no further episodes of loose stools and had returned to baseline following morning.  Given her significant provement overall symptoms I do think it is safe for discharge with close follow-up in the outpatient setting.  Recommend she follow-up with PCP within 1 to 2 weeks for hospital follow-up.  Patient was in agreement with this plan.    Therefore, she is discharged in good and stable condition to home with close outpatient follow-up.    The patient recovered much more quickly than anticipated on admission.    Discharge Date  1/24/2022    FOLLOW UP ITEMS POST DISCHARGE  FU with PCP    DISCHARGE DIAGNOSES  Principal Problem:    Acute colitis POA: Yes  Active Problems:    Acquired hypothyroidism POA: Yes    DUC (generalized anxiety disorder) POA: Yes      Overview: IMO load March 2020    Primary insomnia POA: Yes    Chronic respiratory failure (HCC) POA: Yes    Immunocompromised state (HCC) POA: Yes    GERD (gastroesophageal reflux disease) POA: Yes    Primary pulmonary hypertension (HCC) POA: Yes    Seizures (HCC) POA: Yes  Resolved Problems:    * No resolved hospital problems. *      FOLLOW UP  Future Appointments   Date Time Provider Department Center   3/9/2022  2:40 PM [facility] None   4/13/2022 11:15 AM [facility] None     No follow-up provider specified.    MEDICATIONS ON DISCHARGE     Medication List      CHANGE how you take these medications      Instructions   sertraline 100 MG Tabs  What changed: when to take this  Commonly known as: Zoloft   Take 1 Tablet by mouth every day.  Dose: 100 mg        CONTINUE taking these medications      Instructions   albuterol 108 (90 Base) MCG/ACT Aers inhalation aerosol  Commonly known as: ProAir HFA   Inhale 2 " Puffs every four hours as needed for Shortness of Breath (wheezing).  Dose: 2 Puff     ambrisentan 10 MG tablet  Commonly known as: LETAIRIS   TAKE 1 TABLET BY MOUTH EVERY DAY  Dose: 10 mg     fludrocortisone 0.1 MG Tabs  Commonly known as: FLORINEF   Take 0.1 mg by mouth every day.  Dose: 0.1 mg     furosemide 20 MG Tabs  Commonly known as: LASIX   Take 1 Tablet by mouth every day. Indications: Edema  Dose: 20 mg     gabapentin 100 MG Caps  Commonly known as: NEURONTIN   Take 1 Capsule by mouth 2 times a day.  Dose: 100 mg     Home Care Oxygen   Inhale 3-5 L/min continuous. Oxygen dose range: 3 L/min  Respiratory route via: Nasal Cannula   Oxygen supplier: [facility]  Dose: 3-5 L/min     lacosamide 100 MG Tabs tablet  Commonly known as: VIMPAT   Take 1 Tablet by mouth 2 times a day for 90 days.  Dose: 100 mg     levetiracetam 750 MG tablet  Commonly known as: KEPPRA   Take 1 Tablet by mouth 2 times a day.  Dose: 750 mg     levothyroxine 137 MCG Tabs  Commonly known as: SYNTHROID   Take 1 Tablet by mouth every morning on an empty stomach.  Dose: 137 mcg     mycophenolate 250 MG Caps  Commonly known as: CELLCEPT   Take 250 mg by mouth 2 times a day. Indications: Liver Transplant Recipient  Dose: 250 mg     omeprazole 40 MG delayed-release capsule  Commonly known as: PRILOSEC   Take 1 Capsule by mouth every day.  Dose: 40 mg     ondansetron 4 MG Tbdp  Commonly known as: ZOFRAN ODT   Take 1 Tablet by mouth every 8 hours as needed for Nausea.  Dose: 4 mg     oxyCODONE immediate release 10 MG immediate release tablet  Commonly known as: ROXICODONE   Take 10 mg by mouth every 6 hours as needed for Moderate Pain.  Dose: 10 mg     potassium chloride SA 20 MEQ Tbcr  Commonly known as: Kdur   Take 1 Tablet by mouth every day.  Dose: 20 mEq     pravastatin 20 MG Tabs  Commonly known as: PRAVACHOL   Take 1 Tablet by mouth every day.  Dose: 20 mg     tacrolimus 1 MG Caps  Commonly known as: PROGRAF   Take 2 mg by mouth 2 times a  day.  Dose: 2 mg     tadalafil 20 MG tablet  Commonly known as: CIALIS   Take 1 Tablet by mouth every day.  Dose: 20 mg     therapeutic multivitamin-minerals Tabs   Take 1 Tablet by mouth every evening.  Dose: 1 Tablet     traZODone 50 MG Tabs  Commonly known as: DESYREL   Take 1 Tablet by mouth at bedtime.  Dose: 50 mg            Allergies  Allergies   Allergen Reactions   • Rhubarb Anaphylaxis   • Organic Nitrates Unspecified     Nitroglycerin products should be avoided with the use of PDE-5 inhibitors as the combination can result in severe hypotension.  RD clarified with pt: Nitrates in food are okay and pt does not want nitrates to be listed as food allergy. Pt only avoids medications with nitrates.    • Other Drug      Any medication that may interact with cyclosporine, tacrolimus, sirolimus, or prograf (due to hx of liver transplant)    • Nsaids Unspecified     Can not take due to hx of liver transplant        DIET  Orders Placed This Encounter   Procedures   • Diet Order Diet: Full Liquid     Standing Status:   Standing     Number of Occurrences:   1     Order Specific Question:   Diet:     Answer:   Full Liquid [11]       ACTIVITY  As tolerated.  Weight bearing as tolerated    CONSULTATIONS  None    PROCEDURES  None    LABORATORY  Lab Results   Component Value Date    SODIUM 139 01/24/2022    POTASSIUM 3.6 01/24/2022    CHLORIDE 97 01/24/2022    CO2 24 01/24/2022    GLUCOSE 86 01/24/2022    BUN 11 01/24/2022    CREATININE 0.69 01/24/2022        Lab Results   Component Value Date    WBC 4.2 (L) 01/24/2022    HEMOGLOBIN 10.5 (L) 01/24/2022    HEMATOCRIT 35.5 (L) 01/24/2022    PLATELETCT 107 (L) 01/24/2022        Total time of the discharge process exceeds 35 minutes.

## 2022-01-24 NOTE — CARE PLAN
The patient is Stable - Low risk of patient condition declining or worsening    Shift Goals  Clinical Goals: IV fluids and nausea control  Patient Goals: pain management    Progress made toward(s) clinical / shift goals:  Medication needed to control nausea.     Problem: Pain - Standard  Goal: Alleviation of pain or a reduction in pain to the patient’s comfort goal  Outcome: Progressing     Problem: Knowledge Deficit - Standard  Goal: Patient and family/care givers will demonstrate understanding of plan of care, disease process/condition, diagnostic tests and medications  Outcome: Progressing     Problem: Fall Risk  Goal: Patient will remain free from falls  Outcome: Progressing       Patient is not progressing towards the following goals:

## 2022-01-25 NOTE — PROGRESS NOTES
"1/25/2022- Contacted pt via phone call post d/c. Could not meet with pt bedside due to covid positive. Pt was upset stating, her stay at [facility] was bad and all staff and services \"sucked\". [facility] offered CCM services but pt declined all assistance. Pt does not want to be contacted for additional follow up. Will d/c from caseload.   "

## 2022-01-25 NOTE — RESPIRATORY CARE
"   COPD EDUCATION by COPD CLINICAL EDUCATOR  1/24/2022 at 5:19 PM by Farida Nagel, RRT     Patient reviewed by COPD education team. Patient does not have a history or diagnosis of COPD and is a non-smoker.  Therefore, patient does not qualify for the COPD program.    COPD Screen  COPD Risk Screening  Do you have a history of COPD?: No    COPD Assessment  COPD Clinical Specialists ONLY  COPD Education Initiated: Yes--Short Intervention (Talked to pt goes to Dr. Gaming was just seen by him, hx Sarcoidosis only uses Albuterol gave spacer, NOT COPD)  DME Company: [facility]  DME Equipment Type: Oxygen  Pulmonologist Name: Dr. Gaming - just  seen 1/13/2022  Referrals Initiated:  (Pt declined, did give information on [facility])  Is this a COPD exacerbation patient?: No  $ Demo/Eval of SVN's, MDI's and Aerosols: Yes    PFT Results    No results found for: PFT    Meds to Beds        MY COPD ACTION PLAN     It is recommended that patients and physicians /healthcare providers complete this action plan together. This plan should be discussed at each physician visit and updated as needed.    The green, yellow and red zones show groups of symptoms of COPD. This list of symptoms is not comprehensive, and you may experience other symptoms. In the \"Actions\" column, your healthcare provider has recommended actions for you to take based on your symptoms.    Patient Name: Roxana Cuba   YOB: 1960   Last Updated on:     Green Zone:  I am doing well today Actions   •  Usual activitiy and exercise level •  Take daily medications   •  Usual amounts of cough and phlegm/mucus •  Use oxygen as prescribed   •  Sleep well at night •  Continue regular exercise/diet plan   •  Appetite is good •  At all times avoid cigarette smoke, inhaled irritants     Daily Medications (these medications are taken every day):                Yellow Zone:  I am having a bad day or a COPD flare Actions   •  More " "breathless than usual •  Continue daily medications   •  I have less energy for my daily activities •  Use quick relief inhaler as ordered   •  Increased or thicker phlegm/mucus •  Use oxygen as prescribed   •  Using quick relief inhaler/nebulizer more often •  Get plenty of rest   •  Swelling of ankles more than usual •  Use pursed lip breathing   •  More coughing than usual •  At all times avoid cigarette smoke, inhaled irritants   •  I feel like I have a \"chest cold\"   •  Poor sleep and my symptoms woke me up   •  My appetite is not good   •  My medicine is not helping    •  Call provider immediately if symptoms don’t improve     Continue daily medications, add rescue medications:               Medications to be used during a flare up, (as Discussed with Provider):              Red Zone:  I need urgent medical care Actions   •  Severe shortness of breath even at rest •  Call 911 or seek medical care immediately   •  Not able to do any activity because of breathing    •  Fever or shaking chills    •  Feeling confused or very drowsy     •  Chest pains    •  Coughing up blood              "

## 2022-01-31 PROBLEM — K52.9 COLITIS: Status: ACTIVE | Noted: 2022-01-01

## 2022-02-01 PROBLEM — K76.9 LIVER LESION: Status: ACTIVE | Noted: 2022-01-01

## 2022-02-01 PROBLEM — U07.1 COVID-19: Status: ACTIVE | Noted: 2022-01-01

## 2022-02-01 PROBLEM — K56.7 ILEUS (HCC): Status: ACTIVE | Noted: 2022-01-01

## 2022-02-01 NOTE — ED NOTES
Pt wheeled back to the room from lobby. Pt ambulated with some assistance from the wheelchair to the gurney.    Pt changed into a gown and placed on cardiac monitor, pulse, ox, and bp cuff.

## 2022-02-01 NOTE — ASSESSMENT & PLAN NOTE
May be related to opiates use.  Enteritis is on the differential.  Limit opiates  IV Zofran as needed  Clear liquid diet to Full liquid diet on 2/1.  CTM.

## 2022-02-01 NOTE — ED NOTES
Pt is alert and oriented, speaking in full sentences, follows commands and responds appropriately to questions. Resp are even and unlabored. On cardiac monitor. Plan for admit

## 2022-02-01 NOTE — PROGRESS NOTES
[facility] Medicine Daily Progress Note    Date of Service  2/1/2022    Chief Complaint  Roxana Cuba is a 61 y.o. female admitted 1/31/2022 with abdominal pain and discomfort    Hospital Course  No notes on file    Interval Problem Update  Patient reports no bowel movements overnight, feels like she is about to have a bout of diarrhea.  No chest pain.  No dyspnea.  Ongoing abdominal discomfort.  Tolerating clear liquid diet.  I been contacted by PCPs informed that outpatient Keppra levels have returned slightly high.  This will be confirmed on her labs in the morning.  Additionally, patient tested Covid positive in the interim.  Isolation precautions in place.  No respiratory symptoms at this juncture.    I have personally seen and examined the patient at bedside. I discussed the plan of care with patient.    Consultants/Specialty  None at this time.    Code Status  Full Code    Disposition  Pending at this time    Physical Exam  Temp:  [36.6 °C (97.9 °F)] 36.6 °C (97.9 °F)  Pulse:  [52-84] 84  Resp:  [11-20] 20  BP: ()/(52-74) 116/56  SpO2:  [94 %-100 %] 96 %    General: No acute distress  HEENT atraumatic, normocephalic, pupils equal round reactive to light  Neck: No JVD  Chest: Respirations are unlabored  Cardiac: Physiologic S1 and S2  Abdomen: Soft, nontender, nondistended  Extremities: Without clubbing, cyanosis or edema  Neuro: Cranial nerves II through XII are grossly intact.  Psych: No anxiety, judgement intact.          Current Facility-Administered Medications:   •  LORazepam (ATIVAN) injection 0.5 mg, 0.5 mg, Intravenous, Once PRN, Fredrick Xiao M.D.  •  senna-docusate (PERICOLACE or SENOKOT S) 8.6-50 MG per tablet 2 Tablet, 2 Tablet, Oral, BID, 2 Tablet at 02/01/22 0543 **AND** polyethylene glycol/lytes (MIRALAX) PACKET 1 Packet, 1 Packet, Oral, QDAY PRN **AND** magnesium hydroxide (MILK OF MAGNESIA) suspension 30 mL, 30 mL, Oral, QDAY PRN **AND** bisacodyl (DULCOLAX) suppository 10 mg, 10  mg, Rectal, QDAY PRN, Fredrick Xiao M.D.  •  NS infusion, , Intravenous, Continuous, Fredrick Xiao M.D., Last Rate: 75 mL/hr at 02/01/22 0136, New Bag at 02/01/22 0136  •  enoxaparin (LOVENOX) inj 40 mg, 40 mg, Subcutaneous, DAILY, Fredrick Xiao M.D., 40 mg at 02/01/22 0548  •  acetaminophen (Tylenol) tablet 650 mg, 650 mg, Oral, Q6HRS PRN, Fredrick Xiao M.D.  •  Notify provider if pain remains uncontrolled, , , CONTINUOUS **AND** Use the Numeric Rating Scale (NRS), Jimenez-Baker Faces (WBF), or FLACC on regular floors and Critical-Care Pain Observation Tool (CPOT) on ICUs/Trauma to assess pain, , , CONTINUOUS **AND** Pulse Ox, , , CONTINUOUS **AND** Pharmacy Consult Request ...Pain Management Review 1 Each, 1 Each, Other, PHARMACY TO DOSE **AND** If patient difficult to arouse and/or has respiratory depression (respiratory rate of 10 or less), stop any opiates that are currently infusing and call a Rapid Response., , , CONTINUOUS, Fredrick Xiao M.D.  •  oxyCODONE immediate-release (ROXICODONE) tablet 2.5 mg, 2.5 mg, Oral, Q3HRS PRN **OR** oxyCODONE immediate-release (ROXICODONE) tablet 5 mg, 5 mg, Oral, Q3HRS PRN, 5 mg at 02/01/22 1258 **OR** HYDROmorphone (Dilaudid) injection 0.25 mg, 0.25 mg, Intravenous, Q3HRS PRN, Fredrick Xiao M.D., 0.25 mg at 02/01/22 1405  •  labetalol (NORMODYNE/TRANDATE) injection 10 mg, 10 mg, Intravenous, Q4HRS PRN, Fredrick Xiao M.D.  •  ondansetron (ZOFRAN) syringe/vial injection 4 mg, 4 mg, Intravenous, Q4HRS PRN, Fredrick Xiao M.D.  •  ondansetron (ZOFRAN ODT) dispertab 4 mg, 4 mg, Oral, Q4HRS PRN, Fredrick Xiao M.D.  •  promethazine (PHENERGAN) tablet 12.5-25 mg, 12.5-25 mg, Oral, Q4HRS PRN, Fredrick Xiao M.D.  •  promethazine (PHENERGAN) suppository 12.5-25 mg, 12.5-25 mg, Rectal, Q4HRS PRN, Fredrick Xiao M.D.  •  prochlorperazine (COMPAZINE) injection 5-10 mg, 5-10 mg, Intravenous, Q4HRS PRN, Fredrick Xiao M.D.  •  albuterol inhaler 2  Puff, 2 Puff, Inhalation, Q4H PRN (RT), Fredrick Xiao M.D.  •  ambrisentan (LETAIRIS) TABS 10 mg, 10 mg, Oral, DAILY, Fredrick Xiao M.D., 10 mg at 02/01/22 0600  •  fludrocortisone (FLORINEF) tablet 0.1 mg, 0.1 mg, Oral, DAILY, Fredrick Xiao M.D., 0.1 mg at 02/01/22 0548  •  gabapentin (NEURONTIN) capsule 100 mg, 100 mg, Oral, BID, Fredrick Xiao M.D., 100 mg at 02/01/22 0543  •  lacosamide (VIMPAT) tablet 100 mg, 100 mg, Oral, BID, Fredrick Xiao M.D., 100 mg at 02/01/22 0544  •  levETIRAcetam (KEPPRA) tablet 1,500 mg, 1,500 mg, Oral, BID, Fredrick Xiao M.D., 1,500 mg at 02/01/22 0543  •  levothyroxine (SYNTHROID) tablet 137 mcg, 137 mcg, Oral, AM ES, Fredrick Xiao M.D., 137 mcg at 02/01/22 0548  •  mycophenolate (CELLCEPT) capsule 250 mg, 250 mg, Oral, BID, Fredrick Xiao M.D., 250 mg at 02/01/22 0547  •  omeprazole (PRILOSEC) capsule 40 mg, 40 mg, Oral, DAILY, Fredrick Xiao M.D., 40 mg at 02/01/22 0547  •  pravastatin (PRAVACHOL) tablet 20 mg, 20 mg, Oral, DAILY, Fredrick Xiao M.D., 20 mg at 02/01/22 0547  •  sertraline (Zoloft) tablet 100 mg, 100 mg, Oral, DAILY, Fredrick Xiao M.D., 100 mg at 02/01/22 0544  •  tacrolimus (PROGRAF) capsule 2 mg, 2 mg, Oral, BID, Fredrick Xiao M.D., 2 mg at 02/01/22 0543  •  tadalafil (CIALIS) TABS 20 mg, 20 mg, Oral, DAILY, Fredrick Xiao M.D., 20 mg at 02/01/22 0600  •  therapeutic multivitamin-minerals (THERAGRAN-M) tablet 1 Tablet, 1 Tablet, Oral, Q EVENING, Fredrick Xiao M.D.  •  traZODone (DESYREL) tablet 50 mg, 50 mg, Oral, QHS, Fredrick Xiao M.D., 50 mg at 02/01/22 0132    Current Outpatient Medications:   •  ondansetron (ZOFRAN ODT) 4 MG TABLET DISPERSIBLE, Take 1 Tablet by mouth every 8 hours as needed for Nausea., Disp: 30 Tablet, Rfl: 2  •  traZODone (DESYREL) 50 MG Tab, Take 1 Tablet by mouth at bedtime., Disp: 90 Tablet, Rfl: 0  •  sertraline (ZOLOFT) 100 MG Tab, Take 1 Tablet by mouth every day., Disp: 90  Tablet, Rfl: 0  •  levetiracetam (KEPPRA) 750 MG tablet, TAKE 2 TABLETS BY ENTERAL TUBE ROUTE 2 TIMES A DAY FOR 30 DAYS. (Patient taking differently: Take 1,500 mg by mouth 2 times a day.), Disp: 120 Tablet, Rfl: 3  •  fludrocortisone (FLORINEF) 0.1 MG Tab, Take 0.1 mg by mouth every day., Disp: , Rfl:   •  lacosamide (VIMPAT) 100 MG Tab tablet, Take 1 Tablet by mouth 2 times a day for 90 days., Disp: 60 Tablet, Rfl: 2  •  potassium chloride SA (KDUR) 20 MEQ Tab CR, Take 1 Tablet by mouth every day., Disp: 90 Tablet, Rfl: 3  •  albuterol (PROAIR HFA) 108 (90 Base) MCG/ACT Aero Soln inhalation aerosol, Inhale 2 Puffs every four hours as needed for Shortness of Breath (wheezing)., Disp: 8.5 g, Rfl: 3  •  furosemide (LASIX) 20 MG Tab, Take 1 Tablet by mouth every day. Indications: Edema, Disp: 30 Tablet, Rfl: 0  •  ambrisentan (LETAIRIS) 10 MG tablet, TAKE 1 TABLET BY MOUTH EVERY DAY, Disp: 30 Tablet, Rfl: 11  •  gabapentin (NEURONTIN) 100 MG Cap, Take 1 Capsule by mouth 2 times a day., Disp: 180 Capsule, Rfl: 1  •  levothyroxine (SYNTHROID) 137 MCG Tab, Take 1 Tablet by mouth every morning on an empty stomach., Disp: 90 Tablet, Rfl: 1  •  tacrolimus (PROGRAF) 1 MG Cap, Take 2 mg by mouth 2 times a day., Disp: , Rfl:   •  tadalafil (CIALIS) 20 MG tablet, Take 1 Tablet by mouth every day., Disp: 90 Tablet, Rfl: 3  •  omeprazole (PRILOSEC) 40 MG delayed-release capsule, Take 1 Capsule by mouth every day., Disp: 90 Capsule, Rfl: 3  •  pravastatin (PRAVACHOL) 20 MG Tab, Take 1 Tablet by mouth every day., Disp: 90 Tablet, Rfl: 3  •  mycophenolate (CELLCEPT) 250 MG Cap, Take 250 mg by mouth 2 times a day. Indications: Liver Transplant Recipient, Disp: , Rfl:   •  oxyCODONE immediate release (ROXICODONE) 10 MG immediate release tablet, Take 10 mg by mouth every 6 hours as needed for Moderate Pain., Disp: , Rfl:   •  therapeutic multivitamin-minerals (THERAGRAN-M) Tab, Take 1 Tablet by mouth every evening., Disp: , Rfl:        Fluids    Intake/Output Summary (Last 24 hours) at 2/1/2022 1415  Last data filed at 1/31/2022 2151  Gross per 24 hour   Intake 1000 ml   Output --   Net 1000 ml       Laboratory  Recent Labs     01/31/22  1553 02/01/22  0244   WBC 3.0* 2.5*   RBC 3.26* 2.99*   HEMOGLOBIN 8.5* 7.7*   HEMATOCRIT 27.5* 25.2*   MCV 84.4 84.3   MCH 26.1* 25.8*   MCHC 30.9* 30.6*   RDW 47.2 46.6   PLATELETCT 108* 94*   MPV 9.7 10.6     Recent Labs     01/31/22  1553 02/01/22  0244   SODIUM 143 141   POTASSIUM 3.8 3.4*   CHLORIDE 105 103   CO2 27 29   GLUCOSE 128* 92   BUN 18 14   CREATININE 0.66 0.52   CALCIUM 8.6 8.2*                   Imaging  CT-ABDOMEN-PELVIS WITH   Final Result         1.  Fluid-filled reactive small bowel loops in left midabdomen suggests ileus and/or enteritis, recommend radiographic follow-up to resolution to exclude progression to obstructive changes as clinically appropriate.   2.  Low-density lesion along the inferior margin of the liver, indeterminate and new since prior studies in 2018, recommend follow-up 3 phase CT liver or MRI of the liver with contrast for further characterization.   3.  Small hiatal hernia   4.  Low-density splenic lesions, appearance favors splenic cyst.   5.  Atherosclerosis      MR-ABDOMEN-WITH & W/O    (Results Pending)        Assessment/Plan  COVID-19  Assessment & Plan  Patient is not requiring any supplemental O2 at this time, continue routine monitoring and supportive measures.    Ileus (HCC)  Assessment & Plan  May be related to opiates use.  Enteritis is on the differential.  Limit opiates  IV Zofran as needed  Clear liquid diet to Full liquid diet on 2/1.  CTM.      Liver lesion  Assessment & Plan  New since 2018 hypodense liver lesion along the inferior margin of the liver noted on CT of the abdomen and pelvis  That may be liver abscess.  Ordered MRI with contrast for further evaluation  Consider empiric antibiotics if patient spikes fever.    Moderate protein-calorie  malnutrition (HCC)- (present on admission)  Assessment & Plan  No dietary restrictions.  Supplements  Dietitian consult      Seizures (HCC)- (present on admission)  Assessment & Plan  History of  Continue Keppra and Vimpat    Primary pulmonary hypertension (HCC)- (present on admission)  Assessment & Plan  Continue ambrisentan, Cialis, supplemental oxygen on 3 to 5 L.      History of Clostridium difficile infection- (present on admission)  Assessment & Plan  Monitor for diarrhea, consider stool PCR if diarrhea reoccurs.    Status post liver transplant Dr. [place] ([facility])- (present on admission)  Assessment & Plan  Follow-up with transplant as outpatient  Continue tacrolimus, CellCept and prednisone.  Check tacrolimus levels in the morning prior to next dose.       VTE prophylaxis: enoxaparin ppx    I have performed a physical exam and reviewed and updated ROS and Plan today (2/1/2022). In review of yesterday's note (1/31/2022), there are no changes except as documented above.

## 2022-02-01 NOTE — ED NOTES
Med rec updated and complete. Allergies reviewed.  Confirmed name and date of birth.  No antibiotic use in last 30 days.   AMBRISENTAN and TADALAFIL currently at bedside. Pharmacist notified.        Home pharmacy [facility]  [phone]

## 2022-02-01 NOTE — ED PROVIDER NOTES
ED Provider Note    Scribed for Izzy Marcus M.D. by Andrés Hubbard. 1/31/2022  5:53 PM    Primary care provider: Elisa Phillip M.D.  Means of arrival: EMS  History obtained from: Patient  History limited by: None    CHIEF COMPLAINT  Chief Complaint   Patient presents with   • LLQ Pain     Patient presents for LLQ pain which began two weeks ago.   • Nausea/Vomiting/Diarrhea     Given 4mg Zofran and 100mcg Im Fentayl with EMS. Hx of craniotomy and liver transplant      HPI  Roxana Cuba is a 61 y.o. female who presents via EMS for worsening constant left lower quadrant abdominal pain onset 1 month ago. She has a history of multiple abdominal surgeries and a liver transplant and reports a history of obstructions due to adhesions.  She reports associated dry heaves, vomiting, nausea,, diarrhea, malodorous urine, intermittent diarrhea with spots of blood, weight loss, and fever reaching 101°F that last occurred 2 days ago. She also reports intermittent swelling in her bilateral ankles for 5 months. She was admitted at [facility] a week ago for similar symptoms and had a CT-abdomen-pelvis performed that indicated colitis. Per EMS, the patient was given Zofran 4mg and Fentanyl 100mcg en route which minimally alleviated her symptoms. She has a history of craniotomy due to subdural hematoma in August 2021 and liver transplant 10 years ago and has been compliant with her medications. She denies currently taking blood thinners. She denies associated cough, dysuria, shortness of breath, or chest pain. She has received her COVID vaccinations and boosters. She has not had a colonoscopy recently.  Patient reports she is been taking her home oxycodone but that has not been helping her pain.  She reports a 30 pound weight loss in the last 30 days since onset of worsening abdominal pain and nausea, vomiting and diarrhea.    REVIEW OF SYSTEMS  Pertinent positives include dry heave vomiting, nausea, odorous urine, intermittent  "diarrhea with spots of blood, weight loss, and fever.   Pertinent negatives include no cough, dysuria, shortness of breath, or chest pain.   See HPI for further details. All other systems are negative.    PAST MEDICAL HISTORY  Past Medical History:   Diagnosis Date   • Anemia    • Anesthesia     \"takes more to get me under, would rather be intubated\"    • Breath shortness     cont oxygen 5L (Preffered)   • Bronchitis      2016   • Cardiomegaly    • Chronic fatigue and malaise    • Chronic obstructive pulmonary disease (HCC)    • Cirrhosis (HCC) December 2011    Status post liver transplant at [facility]   • CKD (chronic kidney disease) stage 3, GFR 30-59 ml/min ([facility])    • Diabetes ([facility])     reports hx of, resolved w/weight loss. Reports still checking bloodsugars twice daily and using insulin PRN   • Fracture of left foot    • GERD (gastroesophageal reflux disease)         • H/O Clostridium difficile infection 02-17-17    reports \"16 months ago\". Current stool sample 01-26-17 neg   • Hemorrhagic disorder ([facility])     \"low platelets\" bruises easily    • Hiatus hernia syndrome    • High cholesterol    • Hypothyroid    • Jaundice 2009   • Liver transplanted ([facility])    • Low back pain 02-17-17    and left foot, 7/10   • Mild aortic regurgitation and aortic valve sclerosis     • On home oxygen therapy     5 liters, Dr. Gaming   • Platelet disorder ([facility])     low platelets   • Pneumonia     aspiration,    • Psychiatric disorder     Mood disorder   • Pulmonary hypertension ([facility])        • S/P cholecystectomy    • Small bowel obstruction ([facility])     01-   • Splenomegaly    • VRE (vancomycin-resistant Enterococci)     02-17-17, pt declines knowledge of this     FAMILY HISTORY  Family History   Problem Relation Age of Onset   • Other Father         Unknown (dead 10 years)   • Diabetes Father    • Heart Disease Father    • Hypertension Father    • Hyperlipidemia Father    • Stroke Father    • Non-contributory Mother    • " Hyperlipidemia Mother    • Alcohol/Drug Mother    • Cancer Paternal Aunt    • Alcohol/Drug Maternal Grandmother    • Alcohol/Drug Maternal Grandfather      SOCIAL HISTORY  Social History     Tobacco Use   • Smoking status: Never Smoker   • Smokeless tobacco: Never Used   • Tobacco comment: avoid all tobacco products   Vaping Use   • Vaping Use: Never used   Substance Use Topics   • Alcohol use: Not Currently     Alcohol/week: 0.0 oz   • Drug use: No      Social History     Substance and Sexual Activity   Drug Use No     SURGICAL HISTORY  Past Surgical History:   Procedure Laterality Date   • CRANIOTOMY Left 8/4/2021    Procedure: CRANIOTOMY;  Surgeon: Tramaine Arnold III, M.D.;  Location: SURGERY [facility];  Service: Neurosurgery   • VENTRAL HERNIA REPAIR ROBOTIC XI N/A 11/12/2018    Procedure: VENTRAL HERNIA REPAIR ROBOTIC XI- FOR EPIGASTRIC INCISIONAL;  Surgeon: John H Ganser, M.D.;  Location: SURGERY [facility];  Service: General   • TURBINATE REDUCTION Bilateral 10/4/2018    Procedure: TURBINATE REDUCTION;  Surgeon: Silviano Erazo M.D.;  Location: SURGERY SAME DAY [facility]S;  Service: Ent   • NASAL RECONSTRUCTION Bilateral 10/4/2018    Procedure: NASAL RECONSTRUCTION- LATERA IMPLANTS;  Surgeon: Silviano Erazo M.D.;  Location: SURGERY SAME DAY [facility];  Service: Ent   • OTHER  12/11/2017    paniculectomy   • COLONOSCOPY N/A 3/27/2017    Procedure: COLONOSCOPY;  Surgeon: Osman Matt M.D.;  Location: SURGERY SAME DAY [facility];  Service:    • GASTROSCOPY N/A 3/27/2017    Procedure: GASTROSCOPY;  Surgeon: Osman Matt M.D.;  Location: SURGERY SAME DAY [facility];  Service:    • BREAST BIOPSY Left 2/21/2017    Procedure: BREAST BIOPSY - WIRE LOCALIZED EXCISONAL ;  Surgeon: Jane Zhao M.D.;  Location: SURGERY SAME DAY [facility];  Service:    • LUNG BIOPSY OPEN  11/2016   • OTHER ABDOMINAL SURGERY      Gasric Sleeve   • BONE MARROW ASPIRATION  3/16/2015    Performed by Freddie MINOR  CLINT Hi at ENDOSCOPY [facility]   • BONE MARROW BIOPSY, NDL/TROCAR  3/16/2015    Performed by Marlena Hi M.D. at ENDOSCOPY [facility]   • RECOVERY  3/31/2014    Performed by Ir[facility]Recovery Surgery at SURGERY [facility]   • RECOVERY  3/24/2014    Performed by Cath-Recovery Surgery at SURGERY SAME DAY [facility]   • RECOVERY  1/21/2014    Performed by Ir[facility]Recovery Surgery at SURGERY SAME DAY [facility]   • BRONCHOSCOPY-ENDO  11/15/2013    Performed by Ha Perez M.D. at ENDOSCOPY [facility]   • RECOVERY  2/27/2013    Performed by Ir[facility]Recovery Surgery at SURGERY SAME DAY [facility]S   • BONE MARROW ASPIRATION  12/31/2012    Performed by Josemanuel Real M.D. at ENDOSCOPY [facility]   • BONE MARROW BIOPSY, NDL/TROCAR  12/31/2012    Performed by Josemanuel Real M.D. at ENDOSCOPY [facility]   • GASTROSCOPY  9/28/2012    Performed by Aravind Richards M.D. at SURGERY [facility]   • SIGMOIDOSCOPY FLEX  9/26/2012    Performed by Kristopher Cardoso M.D. at ENDOSCOPY [facility]   • BRONCHOSCOPY-ENDO  6/19/2012    Performed by MARLENA MURILLO at ENDOSCOPY [facility]   • BRONCHOSCOPY-ENDO  5/29/2012    Performed by SUYAPA CAMARENA at ENDOSCOPY [facility]   • OTHER ABDOMINAL SURGERY  December 2011    [place] [facility] [place].   • GASTROSCOPY-ENDO  3/12/2010    Performed by CAMELIA SAMANO at ENDOSCOPY [facility]   • EXAM UNDER ANESTHESIA  11/11/2009    Performed by BIRD ABDI at SURGERY [facility]   • HEMORRHOIDECTOMY  11/11/2009    Performed by BIRD ABDI at SURGERY [facility]   • KELBY BY LAPAROSCOPY  9/19/2009    Performed by BIRD ABDI at SURGERY [facility]   • CARPAL TUNNEL RELEASE          • KELBY BY LAPAROSCOPY     • GASTRIC BYPASS LAPAROSCOPIC     • HERNIA REPAIR      x3   • HYSTERECTOMY, TOTAL ABDOMINAL          • OTHER      Breast reduction   • OTHER      liver transplant   • GA ANESTH,NOSE,SINUS  SURGERY      x4   • TONSILLECTOMY       CURRENT MEDICATIONS  Home Medications     Reviewed by Colleen Schneider (Pharmacy Tech) on 01/31/22 at 2234  Med List Status: Complete   Medication Last Dose Status   albuterol (PROAIR HFA) 108 (90 Base) MCG/ACT Aero Soln inhalation aerosol 1/31/2022 Active   ambrisentan (LETAIRIS) 10 MG tablet 1/31/2022 Active   fludrocortisone (FLORINEF) 0.1 MG Tab 1/31/2022 Active   furosemide (LASIX) 20 MG Tab 1/31/2022 Active   gabapentin (NEURONTIN) 100 MG Cap 1/31/2022 Active   lacosamide (VIMPAT) 100 MG Tab tablet 1/31/2022 Active   levetiracetam (KEPPRA) 750 MG tablet 1/31/2022 Active   levothyroxine (SYNTHROID) 137 MCG Tab 1/31/2022 Active   mycophenolate (CELLCEPT) 250 MG Cap 1/31/2022 Active   omeprazole (PRILOSEC) 40 MG delayed-release capsule 1/31/2022 Active   ondansetron (ZOFRAN ODT) 4 MG TABLET DISPERSIBLE 1/30/2022 Active   oxyCODONE immediate release (ROXICODONE) 10 MG immediate release tablet 1/31/2022 Active   potassium chloride SA (KDUR) 20 MEQ Tab CR 1/31/2022 Active   pravastatin (PRAVACHOL) 20 MG Tab 1/31/2022 Active   sertraline (ZOLOFT) 100 MG Tab 1/31/2022 Active   tacrolimus (PROGRAF) 1 MG Cap 1/31/2022 Active   tadalafil (CIALIS) 20 MG tablet 1/30/2022 Active   therapeutic multivitamin-minerals (THERAGRAN-M) Tab 1/30/2022 Active   traZODone (DESYREL) 50 MG Tab 1/30/2022 Active              ALLERGIES  Allergies   Allergen Reactions   • Nsaids Unspecified     Can not take due to hx of liver transplant    • Rhubarb Anaphylaxis   • Organic Nitrates Unspecified     Nitroglycerin products should be avoided with the use of PDE-5 inhibitors as the combination can result in severe hypotension.  RD clarified with pt: Nitrates in food are okay and pt does not want nitrates to be listed as food allergy. Pt only avoids medications with nitrates.    • Other Drug      Any medication that may interact with cyclosporine, tacrolimus, sirolimus, or prograf (due to hx of liver  "transplant)      PHYSICAL EXAM  VITAL SIGNS: /74   Pulse 73   Temp 36.6 °C (97.9 °F) (Temporal)   Resp 16   Ht 1.753 m (5' 9\")   Wt 59 kg (130 lb)   LMP 01/03/2000   SpO2 94%   Breastfeeding No   BMI 19.20 kg/m²   Constitutional: Well developed, well nourished; No acute distress; Non-toxic appearance.   HENT: Normocephalic, atraumatic; Bilateral external ears normal; Dry mucous membranes; No erythema or exudates in the posterior oropharynx.   Eyes: PERRL, EOMI, Conjunctiva normal. No discharge.   Neck:  Supple, nontender midline; No stridor; No nuchal rigidity.   Lymphatic: No cervical lymphadenopathy noted.   Cardiovascular: Regular rate and rhythm without murmurs, rubs, or gallop.   Thorax & Lungs: No respiratory distress, Decreased breath sounds throughout. Without wheezing, rales or rhonchi. Nontender chest wall. No crepitus or subcutaneous air  Abdomen: Multiple healed surgical scars to abdomen, Soft, Tender to palpation in the left mid abdomen and left lower quadrant, No percussion tenderness, bowel sounds normal. No obvious masses; No pulsatile masses; no rebound, guarding, or peritoneal signs.   Skin: Good color; warm and dry without rash or petechia.  Back: Nontender, No CVA tenderness.   Extremities: Distal radial, dorsalis pedis, posterior tibial pulses are equal bilaterally; Slight swelling to her bilateral ankles; Nontender calves or saphenous, No cyanosis, No clubbing.   Musculoskeletal: Good range of motion in all major joints. No tenderness to palpation or major deformities noted.   Neurologic: Alert & oriented x 4, clear speech.    LABS/RADIOLOGY/PROCEDURES  Results for orders placed or performed during the hospital encounter of 01/31/22   CBC WITH DIFFERENTIAL   Result Value Ref Range    WBC 3.0 (L) 4.8 - 10.8 K/uL    RBC 3.26 (L) 4.20 - 5.40 M/uL    Hemoglobin 8.5 (L) 12.0 - 16.0 g/dL    Hematocrit 27.5 (L) 37.0 - 47.0 %    MCV 84.4 81.4 - 97.8 fL    MCH 26.1 (L) 27.0 - 33.0 pg    " MCHC 30.9 (L) 33.6 - 35.0 g/dL    RDW 47.2 35.9 - 50.0 fL    Platelet Count 108 (L) 164 - 446 K/uL    MPV 9.7 9.0 - 12.9 fL    Neutrophils-Polys 63.40 44.00 - 72.00 %    Lymphocytes 23.40 22.00 - 41.00 %    Monocytes 7.20 0.00 - 13.40 %    Eosinophils 4.60 0.00 - 6.90 %    Basophils 0.70 0.00 - 1.80 %    Immature Granulocytes 0.70 0.00 - 0.90 %    Nucleated RBC 0.00 /100 WBC    Neutrophils (Absolute) 1.93 (L) 2.00 - 7.15 K/uL    Lymphs (Absolute) 0.71 (L) 1.00 - 4.80 K/uL    Monos (Absolute) 0.22 0.00 - 0.85 K/uL    Eos (Absolute) 0.14 0.00 - 0.51 K/uL    Baso (Absolute) 0.02 0.00 - 0.12 K/uL    Immature Granulocytes (abs) 0.02 0.00 - 0.11 K/uL    NRBC (Absolute) 0.00 K/uL   COMP METABOLIC PANEL   Result Value Ref Range    Sodium 143 135 - 145 mmol/L    Potassium 3.8 3.6 - 5.5 mmol/L    Chloride 105 96 - 112 mmol/L    Co2 27 20 - 33 mmol/L    Anion Gap 11.0 7.0 - 16.0    Glucose 128 (H) 65 - 99 mg/dL    Bun 18 8 - 22 mg/dL    Creatinine 0.66 0.50 - 1.40 mg/dL    Calcium 8.6 8.5 - 10.5 mg/dL    AST(SGOT) 32 12 - 45 U/L    ALT(SGPT) 23 2 - 50 U/L    Alkaline Phosphatase 68 30 - 99 U/L    Total Bilirubin <0.2 0.1 - 1.5 mg/dL    Albumin 3.5 3.2 - 4.9 g/dL    Total Protein 5.5 (L) 6.0 - 8.2 g/dL    Globulin 2.0 1.9 - 3.5 g/dL    A-G Ratio 1.8 g/dL   LIPASE   Result Value Ref Range    Lipase 12 11 - 82 U/L   URINALYSIS    Specimen: Urine, Clean Catch   Result Value Ref Range    Color Yellow     Character Clear     Specific Gravity 1.019 <1.035    Ph 7.5 5.0 - 8.0    Glucose Negative Negative mg/dL    Ketones Negative Negative mg/dL    Protein Negative Negative mg/dL    Bilirubin Negative Negative    Urobilinogen, Urine 0.2 Negative    Nitrite Negative Negative    Leukocyte Esterase Negative Negative    Occult Blood Negative Negative    Micro Urine Req see below    ESTIMATED GFR   Result Value Ref Range    GFR If African American >60 >60 mL/min/1.73 m 2    GFR If Non African American >60 >60 mL/min/1.73 m 2   LACTIC ACID    Result Value Ref Range    Lactic Acid 0.6 0.5 - 2.0 mmol/L     *Note: Due to a large number of results and/or encounters for the requested time period, some results have not been displayed. A complete set of results can be found in Results Review.     COURSE & MEDICAL DECISION MAKING  Pertinent Labs & Imaging studies reviewed. (See chart for details)    Reviewed patient's old medical records which showed that the patient was admitted at [facility] on January 23 for 1 day for CT confirmed colitis.  She presented with nausea, vomiting, diarrhea and abdominal pain at that time.    5:53 PM - Patient seen and examined at bedside. Discussed plan of care. Patient agrees to the plan of care. Ordered for labs to evaluate her symptoms.     7:44 PM - Ordered lactic acid and CT-abdomen-pelvis w/ to evaluate. Patient will be treated with LR bolus, Zofran 4mg, and morphine 4mg.    9:02 PM - Patient will be treated with morphine 4mg.    10:18 PM - Paged hospitalist.    10:31 PM - I reevaluated the patient at bedside. I informed the patient of my plan to admit today given the patient's current presentation and diagnostic study results. Patient verbalizes understanding and support with my plan for admission.     10:46 PM I discussed the patient's case and the above findings with Dr. Xiao (Hospitalist) who agreed to evaluate the patient for admission.      Patient presents to the ER with complaints of left sided abdominal pain for the last 1 month with associated nausea, vomiting, and diarrhea.  She reports fever 2 days ago.  She describes her urine is malodorous.  Her urine is clear.  She is tender in the left mid abdomen and left lower quadrant.  She has history of liver transplant and is currently on her rejection medications as prescribed.  She has no tenderness in the right upper quadrant.  Liver function tests are normal.  She is pancytopenic but her labs are at baseline for her.  CT scan with IV contrast reveals  findings consistent with an enteritis or ileus.  No conor obstruction at this time.  Patient's vitals are normal stable.  She reports significant weight loss over the last 1 month.  She has required over 3 rounds of IV pain medications to help control her pain.  At this time she will need to be hospitalized for enteritis versus ileus with intractable abdominal pain.  She does not appear to be septic or toxic.  At this time nothing surgical.  She has history of sarcoidosis.  Perhaps some of these issues are secondary to a flareup of sarcoid.  I spoke with , hospitalist on-call, he will kindly evaluate the patient hospitalization.          HYDRATION: Based on the patient's presentation of Acute Vomiting the patient was given IV fluids. IV Hydration was used because oral hydration was not adequate alone. Upon recheck following hydration, the patient was improved.    DISPOSITION:  Patient will be hospitalized by Dr. Xiao in guarded condition.    FINAL IMPRESSION  1. Nausea and vomiting, intractability of vomiting not specified, unspecified vomiting type Acute   2. Diarrhea, unspecified type Acute   3. Enteritis Acute         Andrés BRODY (Lee), am scribing for, and in the presence of, Izzy Marcus M.D..    Electronically signed by: Andrés Hubbard (Lee), 1/31/2022    Izzy BRODY M.D. personally performed the services described in this documentation, as scribed by Andrés Hubbard in my presence, and it is both accurate and complete. C.    This dictation has been created using voice recognition software. The accuracy of the dictation is limited by the abilities of the software. I expect there may be some errors of grammar and possibly content. I made every attempt to manually correct the errors within my dictation. However, errors related to voice recognition software may still exist and should be interpreted within the appropriate context.    The note accurately reflects work and decisions made by me.   Izzy Marcus M.D.  2/1/2022  12:14 AM

## 2022-02-01 NOTE — ASSESSMENT & PLAN NOTE
Patient is not requiring any supplemental O2 at this time, continue routine monitoring and supportive measures.

## 2022-02-01 NOTE — RESPIRATORY CARE
COPD EDUCATION by COPD CLINICAL EDUCATOR  2/1/2022 at 8:13 AM by Mesha Miller, RRT     Patient reviewed by COPD education team. Patient does not have a history or diagnosis of COPD has never smoked. Therefore, patient does not qualify for the COPD program. Of note, patient is an established patient of [facility] and follows regularly for her Sarcoidosis and complex sleep disorders,

## 2022-02-01 NOTE — ASSESSMENT & PLAN NOTE
Monitor for diarrhea, consider stool PCR if diarrhea reoccurs.  2/2: No bowel movement since last night.  No diarrhea reported.

## 2022-02-01 NOTE — ED NOTES
PIV established by AEMT. Lactic drawn. UA collected. All labs sent to lab. Call light within reach of patient.

## 2022-02-01 NOTE — H&P
Hospital Medicine History & Physical Note    Date of Service  1/31/2022    Primary Care Physician  Elisa Phillip M.D.    Consultants  None    Specialist Names: N/A    Code Status  Full Code    Chief Complaint  Chief Complaint   Patient presents with   • LLQ Pain     Patient presents for LLQ pain which began two weeks ago.   • Nausea/Vomiting/Diarrhea     Given 4mg Zofran and 100mcg Im Fentayl with EMS. Hx of craniotomy and liver transplant        History of Presenting Illness  Roxana Cuba is a 61 y.o. female with complicated past medical history of liver liver cirrhosis, status post liver transplant 10 years ago on immunosuppressants, small bowel obstruction by adhesions, subdural hematoma in August 2021, CKD stage III, COPD, pulmonary hypertension, status post ASD repair, adrenal insufficiency, chronic pain, dependent on opiates, history of gastric sleeve with recurrent aspiration and repeat surgery in 2018, generalized anxiety disorder, chronic hypoxia on 5 L, C. difficile infection, pulmonary hypertension, type 2 diabetes, chronic fatigue, who presented 1/31/2022 with worsening of chronic left lower quadrant abdominal pain for 1 month, nausea, inability to tolerate oral intake.  She had fever 1 1 2 days ago, but not in the last 24 hours.  Intermittently she has loose bowel movements, but did not have any in the last 24 hours.  Patient states she lost 30 pounds in the last month.  She presented [facility] on 1/23-1/24 with similar symptoms.  At that time CT with contrast showed colonic wall thickening in the cecum and sigmoid suggestive of colitis.  Repeat CT of the abdomen and pelvis with contrast showed fluid-filled small bowel loops suggestive for enteritis versus ileus.  There is an additional finding of low-density lesion along the inferior margin of the liver  Patient has been admitted    I discussed the plan of care with patient.    Review of Systems  Review of Systems   Constitutional:  Positive for malaise/fatigue and weight loss. Negative for chills and fever.   HENT: Negative for ear pain, hearing loss and tinnitus.    Eyes: Negative for blurred vision, double vision and photophobia.   Respiratory: Negative for cough, hemoptysis and sputum production.    Cardiovascular: Negative for chest pain, palpitations and orthopnea.   Gastrointestinal: Positive for abdominal pain, constipation, diarrhea, nausea and vomiting. Negative for blood in stool and heartburn.   Genitourinary: Negative for dysuria, flank pain, frequency and hematuria.   Musculoskeletal: Negative for back pain, joint pain and neck pain.   Skin: Negative for itching and rash.   Neurological: Negative for tremors, speech change, focal weakness and headaches.   Endo/Heme/Allergies: Negative for environmental allergies and polydipsia. Does not bruise/bleed easily.   Psychiatric/Behavioral: Negative for hallucinations and substance abuse. The patient is not nervous/anxious.        Past Medical History   has a past medical history of Anemia, Anesthesia, Breath shortness, Bronchitis ( ), Cardiomegaly, Chronic fatigue and malaise, Chronic obstructive pulmonary disease (HCC), Cirrhosis ([facility]) (December 2011), CKD (chronic kidney disease) stage 3, GFR 30-59 ml/min ([facility]), Diabetes (HCC), Fracture of left foot, GERD (gastroesophageal reflux disease), H/O Clostridium difficile infection (02-17-17), Hemorrhagic disorder ([facility]), Hiatus hernia syndrome, High cholesterol, Hypothyroid, Jaundice (2009), Liver transplanted ([facility]), Low back pain (02-17-17), Mild aortic regurgitation and aortic valve sclerosis ( ), On home oxygen therapy, Platelet disorder ([facility]), Pneumonia, Psychiatric disorder, Pulmonary hypertension ([facility]), S/P cholecystectomy, Small bowel obstruction (HCC), Splenomegaly, and VRE (vancomycin-resistant Enterococci).    Surgical History   has a past surgical history that includes pr anesth,nose,sinus surgery; makayla by laparoscopy (9/19/2009);  exam under anesthesia (11/11/2009); hysterectomy, total abdominal; gastroscopy-endo (3/12/2010); bronchoscopy-endo (5/29/2012); bronchoscopy-endo (6/19/2012); sigmoidoscopy flex (9/26/2012); recovery (2/27/2013); bronchoscopy-endo (11/15/2013); recovery (1/21/2014); recovery (3/24/2014); hemorrhoidectomy (11/11/2009); gastroscopy (9/28/2012); carpal tunnel release; bone marrow aspiration (12/31/2012); bone marrow biopsy, ndl/trocar (12/31/2012); recovery (3/31/2014); other abdominal surgery (December 2011); bone marrow aspiration (3/16/2015); bone marrow biopsy, ndl/trocar (3/16/2015); lung biopsy open (11/2016); tonsillectomy; other abdominal surgery (); breast biopsy (Left, 2/21/2017); colonoscopy (N/A, 3/27/2017); gastroscopy (N/A, 3/27/2017); gastric bypass laparoscopic; hernia repair; makayla by laparoscopy; other; other (12/11/2017); other; turbinate reduction (Bilateral, 10/4/2018); nasal reconstruction (Bilateral, 10/4/2018); ventral hernia repair robotic xi (N/A, 11/12/2018); and craniotomy (Left, 8/4/2021).     Family History  family history includes Alcohol/Drug in her maternal grandfather, maternal grandmother, and mother; Cancer in her paternal aunt; Diabetes in her father; Heart Disease in her father; Hyperlipidemia in her father and mother; Hypertension in her father; Non-contributory in her mother; Other in her father; Stroke in her father.   Family history reviewed with patient. There is no family history that is pertinent to the chief complaint.     Social History   reports that she has never smoked. She has never used smokeless tobacco. She reports previous alcohol use. She reports that she does not use drugs.    Allergies  Allergies   Allergen Reactions   • Nsaids Unspecified     Can not take due to hx of liver transplant    • Rhubarb Anaphylaxis   • Organic Nitrates Unspecified     Nitroglycerin products should be avoided with the use of PDE-5 inhibitors as the combination can result in  severe hypotension.  RD clarified with pt: Nitrates in food are okay and pt does not want nitrates to be listed as food allergy. Pt only avoids medications with nitrates.    • Other Drug      Any medication that may interact with cyclosporine, tacrolimus, sirolimus, or prograf (due to hx of liver transplant)        Medications  Prior to Admission Medications   Prescriptions Last Dose Informant Patient Reported? Taking?   albuterol (PROAIR HFA) 108 (90 Base) MCG/ACT Aero Soln inhalation aerosol 1/31/2022 at 0800 Significant Other No No   Sig: Inhale 2 Puffs every four hours as needed for Shortness of Breath (wheezing).   ambrisentan (LETAIRIS) 10 MG tablet 1/31/2022 at 0800 Significant Other No No   Sig: TAKE 1 TABLET BY MOUTH EVERY DAY   fludrocortisone (FLORINEF) 0.1 MG Tab 1/31/2022 at 0800 Significant Other Yes No   Sig: Take 0.1 mg by mouth every day.   furosemide (LASIX) 20 MG Tab 1/31/2022 at 0800 Significant Other No No   Sig: Take 1 Tablet by mouth every day. Indications: Edema   gabapentin (NEURONTIN) 100 MG Cap 1/31/2022 at 0800 Significant Other No No   Sig: Take 1 Capsule by mouth 2 times a day.   lacosamide (VIMPAT) 100 MG Tab tablet 1/31/2022 at 0800 Significant Other No No   Sig: Take 1 Tablet by mouth 2 times a day for 90 days.   levetiracetam (KEPPRA) 750 MG tablet 1/31/2022 at 0800 Significant Other No No   Sig: TAKE 2 TABLETS BY ENTERAL TUBE ROUTE 2 TIMES A DAY FOR 30 DAYS.   Patient taking differently: Take 1,500 mg by mouth 2 times a day.   levothyroxine (SYNTHROID) 137 MCG Tab 1/31/2022 at 0700 Significant Other No No   Sig: Take 1 Tablet by mouth every morning on an empty stomach.   mycophenolate (CELLCEPT) 250 MG Cap 1/31/2022 at 0800 Significant Other Yes No   Sig: Take 250 mg by mouth 2 times a day. Indications: Liver Transplant Recipient   omeprazole (PRILOSEC) 40 MG delayed-release capsule 1/31/2022 at 0800 Significant Other No No   Sig: Take 1 Capsule by mouth every day.   ondansetron  (ZOFRAN ODT) 4 MG TABLET DISPERSIBLE 1/30/2022 at 2000 Significant Other No No   Sig: Take 1 Tablet by mouth every 8 hours as needed for Nausea.   oxyCODONE immediate release (ROXICODONE) 10 MG immediate release tablet 1/31/2022 at 0800 Significant Other Yes No   Sig: Take 10 mg by mouth every 6 hours as needed for Moderate Pain.   potassium chloride SA (KDUR) 20 MEQ Tab CR 1/31/2022 at 0800 Significant Other No No   Sig: Take 1 Tablet by mouth every day.   pravastatin (PRAVACHOL) 20 MG Tab 1/31/2022 at 0800 Significant Other No No   Sig: Take 1 Tablet by mouth every day.   sertraline (ZOLOFT) 100 MG Tab 1/31/2022 at 0800 Significant Other No No   Sig: Take 1 Tablet by mouth every day.   tacrolimus (PROGRAF) 1 MG Cap 1/31/2022 at 0800 Significant Other Yes No   Sig: Take 2 mg by mouth 2 times a day.   tadalafil (CIALIS) 20 MG tablet 1/30/2022 at 2000 Significant Other No No   Sig: Take 1 Tablet by mouth every day.   therapeutic multivitamin-minerals (THERAGRAN-M) Tab 1/30/2022 at 2000 Significant Other Yes No   Sig: Take 1 Tablet by mouth every evening.   traZODone (DESYREL) 50 MG Tab 1/30/2022 at 2000 Significant Other No No   Sig: Take 1 Tablet by mouth at bedtime.      Facility-Administered Medications: None       Physical Exam  Temp:  [36.6 °C (97.9 °F)] 36.6 °C (97.9 °F)  Pulse:  [60-73] 62  Resp:  [13-19] 15  BP: (122-154)/(60-74) 132/60  SpO2:  [94 %-100 %] 99 %  Blood Pressure: 132/60   Temperature: 36.6 °C (97.9 °F)   Pulse: 62   Respiration: 15   Pulse Oximetry: 99 %       Physical Exam  Vitals and nursing note reviewed.   Constitutional:       General: She is not in acute distress.     Appearance: Normal appearance.   HENT:      Head: Normocephalic and atraumatic.      Nose: Nose normal.      Mouth/Throat:      Mouth: Mucous membranes are moist.   Eyes:      Extraocular Movements: Extraocular movements intact.      Pupils: Pupils are equal, round, and reactive to light.   Cardiovascular:      Rate and  Rhythm: Normal rate and regular rhythm.   Pulmonary:      Effort: Pulmonary effort is normal.      Breath sounds: Normal breath sounds.   Abdominal:      General: Abdomen is flat. There is no distension.      Tenderness: There is abdominal tenderness in the left lower quadrant. There is no guarding or rebound.   Musculoskeletal:         General: No swelling or deformity. Normal range of motion.      Cervical back: Normal range of motion and neck supple.   Skin:     General: Skin is warm and dry.   Neurological:      General: No focal deficit present.      Mental Status: She is alert and oriented to person, place, and time.   Psychiatric:         Mood and Affect: Mood is anxious and depressed. Affect is labile.         Laboratory:  Recent Labs     01/31/22  1553   WBC 3.0*   RBC 3.26*   HEMOGLOBIN 8.5*   HEMATOCRIT 27.5*   MCV 84.4   MCH 26.1*   MCHC 30.9*   RDW 47.2   PLATELETCT 108*   MPV 9.7     Recent Labs     01/31/22  1553   SODIUM 143   POTASSIUM 3.8   CHLORIDE 105   CO2 27   GLUCOSE 128*   BUN 18   CREATININE 0.66   CALCIUM 8.6     Recent Labs     01/31/22  1553   ALTSGPT 23   ASTSGOT 32   ALKPHOSPHAT 68   TBILIRUBIN <0.2   LIPASE 12   GLUCOSE 128*         No results for input(s): NTPROBNP in the last 72 hours.      No results for input(s): TROPONINT in the last 72 hours.    Imaging:  CT-ABDOMEN-PELVIS WITH   Final Result         1.  Fluid-filled reactive small bowel loops in left midabdomen suggests ileus and/or enteritis, recommend radiographic follow-up to resolution to exclude progression to obstructive changes as clinically appropriate.   2.  Low-density lesion along the inferior margin of the liver, indeterminate and new since prior studies in 2018, recommend follow-up 3 phase CT liver or MRI of the liver with contrast for further characterization.   3.  Small hiatal hernia   4.  Low-density splenic lesions, appearance favors splenic cyst.   5.  Atherosclerosis          X-Ray:  I have personally  reviewed the images and compared with prior images.    Assessment/Plan:  I anticipate this patient will require at least two midnights for appropriate medical management, necessitating inpatient admission.    Ileus (HCC)  Assessment & Plan  May be related to opiates use.  Enteritis is on the differential.  Limit opiates  IV Zofran as needed  Clear liquid diet    Liver lesion  Assessment & Plan  New since 2018 hypodense liver lesion along the inferior margin of the liver noted on CT of the abdomen and pelvis  That may be liver abscess.  Ordered MRI with contrast for further evaluation  Consider empiric antibiotics if patient spikes fever.    Moderate protein-calorie malnutrition (HCC)- (present on admission)  Assessment & Plan  No dietary restrictions.  Supplements  Dietitian consult      Seizures (HCC)- (present on admission)  Assessment & Plan  History of  Continue Keppra and Vimpat    Primary pulmonary hypertension (HCC)- (present on admission)  Assessment & Plan  Continue ambrisentan, Cialis, supplemental oxygen on 3 to 5 L.      History of Clostridium difficile infection- (present on admission)  Assessment & Plan  Monitor for diarrhea, consider stool PCR if diarrhea reoccurs.    Status post liver transplant Dr. [place] ([facility])- (present on admission)  Assessment & Plan  Follow-up with transplant tomorrow just as outpatient  Continue tacrolimus      VTE prophylaxis: enoxaparin ppx

## 2022-02-01 NOTE — ASSESSMENT & PLAN NOTE
New since 2018 hypodense liver lesion along the inferior margin of the liver noted on CT of the abdomen and pelvis  That may be liver abscess.  Ordered MRI with contrast for further evaluation  Consider empiric antibiotics if patient spikes fever.  2/2: MRI of the abdomen showed ill-defined hypointense lesion in the liver likely related to prior surgery.  There was suggestion of portal hypertension with portal venous distention and esophageal varices.  There was mild colonic wall thickening suggesting colitis.  Patient has history of C. difficile in 2016 but had no diarrhea and no bowel movement since admission.  Patient describes her pain as periumbilical that is worsened with p.o. intake followed by nausea and vomiting.  No specific upper GI symptoms.  Discussed the case with GI Dr. Richards who will evaluate the patient and possibly perform EGD tomorrow morning.  Given her chronic use of narcotics and having abdominal pain for 2 months, narcotic bowels syndrome on the differential.  We will try 1 dose of Relistor and assess response.

## 2022-02-01 NOTE — PROGRESS NOTES
Assumed care of patient at 0700. Awake and alert. JAYY's. Complains of right sided flank pain. States pain medications ordered are not relieving her pain. MRI screen completed by previous shift RN.

## 2022-02-01 NOTE — ED NOTES
Break RN: Pt back from CT, pt medicated for pain. All monitoring in place, NAD, no other current needs. Call light within reach.

## 2022-02-01 NOTE — DISCHARGE PLANNING
[facility] TCN chart review completed. Collaborated with ED Case Manager Muriel  prior to meeting with the pt. The most current review of medical record, knowledge of pt's PLOF and social support, LACE+ score of 77, 6 clicks scores of ( none entered) mobility were considered.      Pt seen at bedside, immediately refused IRF and SNF before TCN can introduce program and offer choices for possible post-acute services.. Introduced TCN program. Provided education regarding differences in post acute resources including IRF, SNF and HH . Discussed HTH/[facility] plan benefits including Meds to Beds and [facility] transitional care services.  Choice forms proactively obtained for DME ( Preferred) , JOCELYNE  and given to James to be uploaded to .   Offered [facility] services, mbr reluctantly accepted. I reinforced education regarding benefits of transitional care with regards to positive patient outcomes and avoiding readmissions. Sending [facility]erral to [facility]TCN will continue to follow and collaborate with discharge planning team as additional post acute needs arise. Thank you.    Choice Forms obtained; HH, SHERON( Preferred)  [facility] re-introduced ( Y) Referral Sent ( Y)

## 2022-02-01 NOTE — ASSESSMENT & PLAN NOTE
Follow-up with transplant as outpatient  Continue tacrolimus, CellCept and prednisone.  Check tacrolimus levels in the morning prior to next dose.  2/2: Tacrolimus levels non toxic.

## 2022-02-02 NOTE — PROGRESS NOTES
Hospital Medicine Daily Progress Note    Date of Service  2/2/2022    Chief Complaint  Roxana Cuba is a 61 y.o. 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  Patient stated that she has been having constant periumbilical pain that is usually aggravated with p.o. intake.  This associated with nausea and vomiting 30 minutes after any meal.  She admits to losing almost 30 pounds over the last 2-months.  She has been trying to make an appointment with outpatient GI but was unable to get any sooner appointment so she presented to ER for further evaluation.  In ER noted to be hemodynamically stable.  Afebrile.  Respiratory status at baseline with oxygen demands.  Lab work was significant for pancytopenia.  UA was negative for UTI.  Lactic acid was within normal limits.  Lipase was within normal limits.  CT scan of the abdomen pelvis suggested ileus versus enteritis.  There was low-density lesion along the inferior margin of the liver measured 1.6 cm that is new since prior study in 2018.  MRI of the abdomen was done which showed ill-defined hypointense lesion in the liver likely related to prior surgery with colonic wall thickening in the partially visualized bowel suggesting colitis.  Findings discussed with GI.  Considering her provoked  abdominal pain associate with nausea and vomiting, GI suggested upper endoscopy.  Interval Problem Update  2/2: Recently a couple.  No bowel movement since last night.  Afebrile.  Tolerating liquid diet.  Still having abdominal pain.  MRI findings discussed with patient.  GI consulted.  Planning for possible EGD in the a.m.  We will keep n.p.o. at midnight.    I have personally seen and examined the patient at bedside. I discussed the plan of care with patient.    Consultants/Specialty  GI Dr Richards    Code Status  Full Code    Disposition  Pending at this time  Review of Systems   Constitutional: Positive for malaise/fatigue and weight loss. Negative for chills and fever.   HENT: Negative for hearing loss and tinnitus.    Eyes: Negative for blurred vision and double vision.   Respiratory: Negative for cough and hemoptysis.    Cardiovascular: Negative for chest pain and palpitations.   Gastrointestinal: Positive for abdominal pain, nausea and vomiting. Negative for blood in stool, diarrhea and melena.   Genitourinary: Negative for dysuria and urgency.   Musculoskeletal: Negative for myalgias and neck pain.   Skin: Negative for rash.   Neurological: Negative for dizziness and headaches.   Psychiatric/Behavioral: Negative for depression and suicidal ideas.       Physical Exam  Temp:  [36.3 °C (97.3 °F)-36.8 °C (98.3 °F)] 36.3 °C (97.3 °F)  Pulse:  [57-70] 66  Resp:  [6-20] 18  BP: (102-128)/(46-60) 112/54  SpO2:  [96 %-100 %] 96 %    General: No acute distress  HEENT atraumatic, normocephalic, pupils equal round reactive to light  Neck: No JVD  Chest: Respirations are unlabored  Cardiac: Physiologic S1 and S2  Abdomen: Soft, nontender, nondistended  Extremities: Without clubbing, cyanosis or edema  Neuro: Cranial nerves II through XII are grossly intact.  Psych: No anxiety, judgement intact.          Current Facility-Administered Medications:   •  oxyCODONE immediate release (ROXICODONE) tablet 10 mg, 10 mg, Oral,  Q6HRS PRN, Kimberly Burr M.D.  •  senna-docusate (PERICOLACE or SENOKOT S) 8.6-50 MG per tablet 2 Tablet, 2 Tablet, Oral, BID, 2 Tablet at 02/02/22 0617 **AND** polyethylene glycol/lytes (MIRALAX) PACKET 1 Packet, 1 Packet, Oral, QDAY PRN **AND** magnesium hydroxide (MILK OF MAGNESIA) suspension 30 mL, 30 mL, Oral, QDAY PRN **AND** bisacodyl (DULCOLAX) suppository 10 mg, 10 mg, Rectal, QDAY PRN, Fredrick Xiao M.D.  •  NS infusion, , Intravenous, Continuous, Fredrick Xiao M.D., Stopped at 02/01/22 1936  •  enoxaparin (LOVENOX) inj 40 mg, 40 mg, Subcutaneous, DAILY, Fredrick Xiao M.D., 40 mg at 02/02/22 0618  •  acetaminophen (Tylenol) tablet 650 mg, 650 mg, Oral, Q6HRS PRN, Fredrick Xiao M.D.  •  Notify provider if pain remains uncontrolled, , , CONTINUOUS **AND** Use the Numeric Rating Scale (NRS), Jimenez-Baker Faces (WBF), or FLACC on regular floors and Critical-Care Pain Observation Tool (CPOT) on ICUs/Trauma to assess pain, , , CONTINUOUS **AND** Pulse Ox, , , CONTINUOUS **AND** Pharmacy Consult Request ...Pain Management Review 1 Each, 1 Each, Other, PHARMACY TO DOSE **AND** If patient difficult to arouse and/or has respiratory depression (respiratory rate of 10 or less), stop any opiates that are currently infusing and call a Rapid Response., , , CONTINUOUS, Fredrick Xiao M.D.  •  [DISCONTINUED] oxyCODONE immediate-release (ROXICODONE) tablet 2.5 mg, 2.5 mg, Oral, Q3HRS PRN **OR** [DISCONTINUED] oxyCODONE immediate-release (ROXICODONE) tablet 5 mg, 5 mg, Oral, Q3HRS PRN, 5 mg at 02/02/22 1026 **OR** HYDROmorphone (Dilaudid) injection 0.25 mg, 0.25 mg, Intravenous, Q3HRS PRN, Fredrick Xiao M.D., 0.25 mg at 02/02/22 1137  •  labetalol (NORMODYNE/TRANDATE) injection 10 mg, 10 mg, Intravenous, Q4HRS PRN, Fredrick Xiao M.D.  •  ondansetron (ZOFRAN) syringe/vial injection 4 mg, 4 mg, Intravenous, Q4HRS PRN, Fredrick Xiao M.D., 4 mg at 02/02/22 0803  •  ondansetron (ZOFRAN ODT)  dispertab 4 mg, 4 mg, Oral, Q4HRS PRN, Fredrick Xiao M.D.  •  promethazine (PHENERGAN) tablet 12.5-25 mg, 12.5-25 mg, Oral, Q4HRS PRN, Fredrick Xiao M.D.  •  promethazine (PHENERGAN) suppository 12.5-25 mg, 12.5-25 mg, Rectal, Q4HRS PRN, Fredrick Xiao M.D.  •  prochlorperazine (COMPAZINE) injection 5-10 mg, 5-10 mg, Intravenous, Q4HRS PRN, Fredrick Xiao M.D.  •  albuterol inhaler 2 Puff, 2 Puff, Inhalation, Q4H PRN (RT), Fredrick Xiao M.D.  •  ambrisentan (LETAIRIS) TABS 10 mg, 10 mg, Oral, DAILY, Fredrick Xiao M.D., 10 mg at 02/02/22 0600  •  fludrocortisone (FLORINEF) tablet 0.1 mg, 0.1 mg, Oral, DAILY, Fredrick Xiao M.D., 0.1 mg at 02/02/22 0617  •  gabapentin (NEURONTIN) capsule 100 mg, 100 mg, Oral, BID, Fredrick Xiao M.D., 100 mg at 02/02/22 0618  •  lacosamide (VIMPAT) tablet 100 mg, 100 mg, Oral, BID, Fredrick Xiao M.D., 100 mg at 02/02/22 0618  •  levETIRAcetam (KEPPRA) tablet 1,500 mg, 1,500 mg, Oral, BID, Fredrick Xiao M.D., 1,500 mg at 02/02/22 0618  •  levothyroxine (SYNTHROID) tablet 137 mcg, 137 mcg, Oral, AM ES, Fredrick Xiao M.D., 137 mcg at 02/02/22 0618  •  mycophenolate (CELLCEPT) capsule 250 mg, 250 mg, Oral, BID, Fredrick Xiao M.D., 250 mg at 02/02/22 0619  •  omeprazole (PRILOSEC) capsule 40 mg, 40 mg, Oral, DAILY, Fredrick Xiao M.D., 40 mg at 02/02/22 0617  •  pravastatin (PRAVACHOL) tablet 20 mg, 20 mg, Oral, DAILY, rFedrick Xiao M.D., 20 mg at 02/02/22 0618  •  sertraline (Zoloft) tablet 100 mg, 100 mg, Oral, DAILY, Fredrick Xiao M.D., 100 mg at 02/02/22 0618  •  tacrolimus (PROGRAF) capsule 2 mg, 2 mg, Oral, BID, Fredrick Xiao M.D., 2 mg at 02/02/22 0617  •  tadalafil (CIALIS) TABS 20 mg, 20 mg, Oral, DAILY, Fredrick Xiao M.D., 20 mg at 02/02/22 0600  •  therapeutic multivitamin-minerals (THERAGRAN-M) tablet 1 Tablet, 1 Tablet, Oral, Q EVENING, Fredrick Xiao M.D., 1 Tablet at 02/01/22 1819  •  traZODone (DESYREL)  tablet 50 mg, 50 mg, Oral, QHS, Fredrick Xiao M.D., 50 mg at 02/01/22 2057      Fluids  No intake or output data in the 24 hours ending 02/02/22 1254    Laboratory  Recent Labs     01/31/22  1553 02/01/22  0244 02/02/22  0340   WBC 3.0* 2.5* 1.8*   RBC 3.26* 2.99* 3.22*   HEMOGLOBIN 8.5* 7.7* 8.2*   HEMATOCRIT 27.5* 25.2* 27.6*   MCV 84.4 84.3 85.7   MCH 26.1* 25.8* 25.5*   MCHC 30.9* 30.6* 29.7*   RDW 47.2 46.6 47.8   PLATELETCT 108* 94* 87*   MPV 9.7 10.6 9.6     Recent Labs     01/31/22  1553 02/01/22  0244 02/02/22  0340   SODIUM 143 141 143   POTASSIUM 3.8 3.4* 3.6   CHLORIDE 105 103 108   CO2 27 29 27   GLUCOSE 128* 92 99   BUN 18 14 8   CREATININE 0.66 0.52 0.52   CALCIUM 8.6 8.2* 8.4*                   Imaging  MR-ABDOMEN-WITH & W/O   Final Result         1.  Ill-defined hypointense lesion in the liver is likely related to prior surgery. There are surgical clips adjacent to the lesion. It is unchanged from prior exams dating back to 2018.      2.  Suggestion of portal hypertension with portal venous distention, small amount of ascites, and esophageal varices      3.  Multiple splenic cysts as seen on prior exams.      4.  Colonic wall thickening in the partially visualized bowel suggests colitis.                           CT-ABDOMEN-PELVIS WITH   Final Result         1.  Fluid-filled reactive small bowel loops in left midabdomen suggests ileus and/or enteritis, recommend radiographic follow-up to resolution to exclude progression to obstructive changes as clinically appropriate.   2.  Low-density lesion along the inferior margin of the liver, indeterminate and new since prior studies in 2018, recommend follow-up 3 phase CT liver or MRI of the liver with contrast for further characterization.   3.  Small hiatal hernia   4.  Low-density splenic lesions, appearance favors splenic cyst.   5.  Atherosclerosis           Assessment/Plan  * Colitis- (present on admission)  Assessment & Plan  Was hospitalized  recently for worsening abdominal pain and was found to have colitis.    Given her history of C. difficile, will hold off on antibiotics for now.  Supportive management.  If patient develops diarrhea, will check C. Difficile toxin PCR.    Ileus (HCC)  Assessment & Plan  May be related to opiates use.  Enteritis is on the differential.  Limit opiates  IV Zofran as needed  Clear liquid diet to Full liquid diet on 2/1.  CTM.      Liver lesion  Assessment & Plan  New since 2018 hypodense liver lesion along the inferior margin of the liver noted on CT of the abdomen and pelvis  That may be liver abscess.  Ordered MRI with contrast for further evaluation  Consider empiric antibiotics if patient spikes fever.  2/2: MRI of the abdomen showed ill-defined hypointense lesion in the liver likely related to prior surgery.  There was suggestion of portal hypertension with portal venous distention and esophageal varices.  There was mild colonic wall thickening suggesting colitis.  Patient has history of C. difficile in 2016 but had no diarrhea and no bowel movement since admission.  Patient describes her pain as periumbilical that is worsened with p.o. intake followed by nausea and vomiting.  No specific upper GI symptoms.  Discussed the case with GI Dr. Richards who will evaluate the patient and possibly perform EGD tomorrow morning.  Given her chronic use of narcotics and having abdominal pain for 2 months, narcotic bowels syndrome on the differential.  We will try 1 dose of Relistor and assess response.    Moderate protein-calorie malnutrition (HCC)- (present on admission)  Assessment & Plan  No dietary restrictions.  Supplements  Dietitian consult      Seizures (HCC)- (present on admission)  Assessment & Plan  History of  Continue Keppra and Vimpat    Abdominal pain  Assessment & Plan  Patient has been having constant periumbilical and left quadrant abdominal pain started 2 months ago.  Imaging studies results as above.  GI  consulted  Possible EGD in the morning.  Keep n.p.o. at midnight.    Primary pulmonary hypertension (HCC)- (present on admission)  Assessment & Plan  Continue ambrisentan, Cialis, supplemental oxygen on 3 to 5 L.      History of Clostridium difficile infection- (present on admission)  Assessment & Plan  Monitor for diarrhea, consider stool PCR if diarrhea reoccurs.  2/2: No bowel movement since last night.  No diarrhea reported.    Status post liver transplant Dr. [place] ([facility])- (present on admission)  Assessment & Plan  Follow-up with transplant as outpatient  Continue tacrolimus, CellCept and prednisone.  Check tacrolimus levels in the morning prior to next dose.  2/2: Tacrolimus levels pending.       VTE prophylaxis: enoxaparin ppx    I have performed a physical exam and reviewed and updated ROS and Plan today (2/2/2022). In review of yesterday's note (2/1/2022), there are no changes except as documented above.

## 2022-02-02 NOTE — PROGRESS NOTES
Ativan 0.5 mg given prior to MRI for claustrophobia. Oxycodone 5 mg given for pain. Transported to MRI.

## 2022-02-02 NOTE — PROGRESS NOTES
Patient is high fall risk. Educated patient on importance of calling for assistance. Patient is continuing to refuse bed alarm.

## 2022-02-02 NOTE — DISCHARGE PLANNING
TCN following. HTH/SCP chart review completed. Collaborated with Latoya RAYMOND. Note per last TCN note and chart review, pt refusing IRF/SNF placement at time of dc. Would note that pt does now have 6 clicks mobility score of 22 indicating very limited mobility issues. She appears to have been reluctantly agreeable to HH and GSC services which would likely benefit pt given hx of readmissions (that appear in part related to nausea, vomiting and abdominal pain). Will continue to monitor and note that pt does not appear medically cleared for dc at this time.     Previously completed:  - Choice forms: HH, DME (refused IRF/SNF choice)  - GSC introduced (Y), referral (sent).

## 2022-02-02 NOTE — PROGRESS NOTES
Received call from pt.  Otis Cuba, pt. Says ok to give out information to .  was updated regarding pt. Status.

## 2022-02-02 NOTE — DIETARY
"Nutrition services: Day 2 of admit.  Roxana Cuba is a 61 y.o. female with admitting DX of abdominal pain, ileus vs enteritis per H&P  History includes liver cirrhosis, status post liver transplant 10 years ago on immunosuppressants, small bowel obstruction, subdural hematoma, CKD stage III, COPD, pulmonary hypertension,  adrenal insufficiency, chronic pain, dependent on opiates, gastric sleeve with recurrent aspiration and repeat surgery in 2018, anxiety disorder, chronic hypoxia on 5 L, diabetes, chronic fatigue  · Patient seen for poor PO intake and weight loss prior to admission.  Patient had full liquid tray at bedside with % consumed.  She stated she didn't know how her appetite was overall since she is only on liquids.    · She stated she lost ~30# in the past 1-2 months.  · She did not want the Boost breeze, but did want yogurt smoothies    Assessment:  Height: 175.3 cm (5' 9\")  Weight: 59 kg (130 lb)  Body mass index is 19.2 kg/m².  Diet/Intake: full liquids    Evaluation:   1. Metabolic panel mostly unremarkable  2. On IVF of NS at 75 ml/hr  3. Per chart review, 18# or 12% weight loss noted since the beginning of January - more per patient    Malnutrition Risk: Severe chronic disease related malnutrition as evidenced by >12% wt loss in less than 3 months, severe muscle wasting noted on observation.  Temporal wasting noted.    Recommendations/Interventions/Plan:    1. Advance diet as tolerated  2. Encourage intake of meals  3. Document intake of all PO as % taken in ADL's to provide interdisciplinary communication across all shifts.   4. Monitor weight.  5. Nutrition rep will continue to see patient for ongoing meal and snack preferences.     RD following    "

## 2022-02-02 NOTE — DISCHARGE PLANNING
ANTICIPATED DISCHARGE DISPOSITION: Home with HHC     INSURANCE: [facility]     BARRIERS TO DISCHARGE: GI consult for possible EGD, clear liquid diet, pending labs, abdominal pain, high fall risk      ACTION: Plan of care discussed at rounds. MRI negative. Oxygen 4-5lpm which is her baseline at home. GI consulted. Per TCN patient is refusing discharge to facility and requesting HHC if needed. Hx of falls at home.       PLAN: discharge to home with HHC if ordered, CM to follow for dc planning needs with TCN

## 2022-02-02 NOTE — PROGRESS NOTES
Pt. Refuses Bed Alarm on, she is coherent with A&Ox4. Educated on the importance of using the equipment to prevent unnecessary falls. Understood the teaching but still refuses BA. Instructed on the use of call light button, and make sure to call for assistance if she needs to. Demonstrated proper use of call light button, and to call phone numbers on comm board for assistance.

## 2022-02-02 NOTE — ASSESSMENT & PLAN NOTE
Patient has been having constant periumbilical and left quadrant abdominal pain started 2 months ago.  Imaging studies results as above.  GI consulted  Possible EGD in the morning.  Keep n.p.o. at midnight.  2/3: Planning for EGD this morning.  2/4: EGD was unremarkable.  GI recommending gastric emptying study to rule out gastroparesis.  2/5: Planning for gastric emptying study this morning.

## 2022-02-02 NOTE — PROGRESS NOTES
4 Eyes Skin Assessment Completed by Maite RN and Sonia RN.    Head WDL  Ears Redness and Blanching  Nose WDL  Mouth WDL  Neck WDL  Breast/Chest WDL  Shoulder Blades WDL  Spine WDL  (R) Arm/Elbow/Hand Redness and Blanching  (L) Arm/Elbow/Hand Redness and Blanching  Abdomen Redness, abd distention  Groin WDL  Scrotum/Coccyx/Buttocks Redness and Blanching  (R) Leg WDL  (L) Leg WDL  (R) Heel/Foot/Toe Redness and Blanching  (L) Heel/Foot/Toe Redness and Blanching          Devices In Places PIV, oxygen nasal cannula      Interventions In Place NC W/Ear Foams, Pillows, Barrier Cream and Pressure Redistribution Mattress    Possible Skin Injury No    Pictures Uploaded Into Epic N/A  Wound Consult Placed N/A  RN Wound Prevention Protocol Ordered No  Upon admission to the floor patient was informed how we do 2 RN Skin checks to make sure there are no skin issues. Pt gave this RN consent to perform a skin check.

## 2022-02-02 NOTE — ASSESSMENT & PLAN NOTE
Was hospitalized recently for worsening abdominal pain and was found to have colitis.    Given her history of C. difficile, will hold off on antibiotics for now.  Supportive management.  If patient develops diarrhea, will check C. Difficile toxin PCR.

## 2022-02-02 NOTE — PROGRESS NOTES
Assumed care of patient this shift. Patient is alert and oriented x 4. Able to make her needs known. Crying this morning, complained of 10/10 pain to abdomen, medicated with PRN; see MAR.

## 2022-02-02 NOTE — CARE PLAN
The patient is Stable - Low risk of patient condition declining or worsening    Shift Goals  Clinical Goals: Pt. pain will be controlled    Progress made toward(s) clinical / shift goals:  Pt. Able to sleep after pain meds was given      Problem: Pain - Standard  Goal: Alleviation of pain or a reduction in pain to the patient’s comfort goal  Outcome: Progressing     Problem: Knowledge Deficit - Standard  Goal: Patient and family/care givers will demonstrate understanding of plan of care, disease process/condition, diagnostic tests and medications  Outcome: Progressing     Problem: Fall Risk  Goal: Patient will remain free from falls  Outcome: Progressing

## 2022-02-02 NOTE — CARE PLAN
Problem: Nutritional:  Goal: Achieve adequate nutritional intake  Description: Patient will tolerate advancing diet and consume 50% of meals  Outcome: Progressing

## 2022-02-02 NOTE — CONSULTS
Gastroenterology Consult Note     Date of Consult: 02/02/22    Requesting Physician: Kimberly Burr M.d.     Reason for consult: Nausea, vomiting, abdominal pain      Chief Complaint   Patient presents with   • LLQ Pain     Patient presents for LLQ pain which began two weeks ago.   • Nausea/Vomiting/Diarrhea     Given 4mg Zofran and 100mcg Im Fentayl with EMS. Hx of craniotomy and liver transplant         History of Present Illness:     This is a pleasant 61-year-old female for whom GI was consulted for unretractable nausea, vomiting, and abdominal pain.  The patient has an extensive past medical history, including status post orthotopic liver transplant (2011, for end-stage liver disease, biliary fibrosis), COPD/interstitial lung disease (on 5 L at baseline), stage III CKD, pulmonary hypertension, seizures, history of multiple bariatric surgeries, hypothyroidism, GERD, major depressive disorder, dyslipidemia, chronic pain (dependent on opioids), MGUS, pancytopenia, and subdural hematoma (August 2021, status post evacuation, with sequelae of some memory loss).    For the past 2 months, the patient has had constant periumbilical and left quadrant abdominal pain, which is described as constant, sharp, and localized.  It is 9 out of 10 on pain scale and is worsened with any food or liquid intake.  She has not been able to eat or drink anything in the past month due to nausea and vomiting and has lost 30 pounds as a result.  She has gone to [facility] 2 times and [facility] 3 times in order to help alleviate this, unfortunately, there was no clinical improvement.  Concomitant symptoms include diarrhea and acid reflux.    In the ED, the patient was hemodynamically stable.  CT AP on 1/31/2022 showed fluid-filled small bowel loops concerning for enteritis versus ileus.  This CT A/P also showed a low-density lesion on the inferior margin of the liver.  MRI of the  "abdomen with and without contrast performed on 2/1/2022 showed the liver lesion was stable due to her OLT; however, MRI was concerning for colitis, possible portal hypertension, portal venous distention, small ascites, esophageal varices, and multiple splenic cysts.  Currently, the patient states that she is still having abdominal pain.  She is concerned that the symptoms are still unretractable and does not know really what to do.       Past Medical History:  Past Medical History:   Diagnosis Date   • Anemia    • Anesthesia     \"takes more to get me under, would rather be intubated\"    • Breath shortness     cont oxygen 5L (Preffered)   • Bronchitis      2016   • Cardiomegaly    • Chronic fatigue and malaise    • Chronic obstructive pulmonary disease (HCC)    • Cirrhosis (HCC) December 2011    [place] [facility] [place]   • CKD (chronic kidney disease) stage 3, GFR 30-59 ml/min (HCC)    • Diabetes (HCC)     reports hx of, resolved w/weight loss. Reports still checking bloodsugars twice daily and using insulin PRN   • Fracture of left foot    • GERD (gastroesophageal reflux disease)         • H/O Clostridium difficile infection 02-17-17    reports \"16 months ago\". Current stool sample 01-26-17 neg   • Hemorrhagic disorder (HCC)     \"low platelets\" bruises easily    • Hiatus hernia syndrome    • High cholesterol    • Hypothyroid    • Jaundice 2009   • Liver transplanted (HCC)    • Low back pain 02-17-17    and left foot, 7/10   • Mild aortic regurgitation and aortic valve sclerosis     • On home oxygen therapy     5 liters, Dr. Gaimng   • Platelet disorder (HCC)     low platelets   • Pneumonia     aspiration,    • Psychiatric disorder     Mood disorder   • Pulmonary hypertension (HCC)        • S/P cholecystectomy    • Small bowel obstruction (HCC)     01-   • Splenomegaly    • VRE (vancomycin-resistant Enterococci)     02-17-17, pt declines knowledge of this        Past Surgical History: "   Past Surgical History:   Procedure Laterality Date   • CRANIOTOMY Left 8/4/2021    Procedure: CRANIOTOMY;  Surgeon: Tramaine Arnold III, M.D.;  Location: SURGERY [facility];  Service: Neurosurgery   • VENTRAL HERNIA REPAIR ROBOTIC XI N/A 11/12/2018    Procedure: VENTRAL HERNIA REPAIR ROBOTIC XI- FOR EPIGASTRIC INCISIONAL;  Surgeon: John H Ganser, M.D.;  Location: SURGERY [facility];  Service: General   • TURBINATE REDUCTION Bilateral 10/4/2018    Procedure: TURBINATE REDUCTION;  Surgeon: Silviano Erazo M.D.;  Location: SURGERY SAME DAY [facility];  Service: Ent   • NASAL RECONSTRUCTION Bilateral 10/4/2018    Procedure: NASAL RECONSTRUCTION- LATERA IMPLANTS;  Surgeon: Silviano Erazo M.D.;  Location: SURGERY SAME DAY [facility];  Service: Ent   • OTHER  12/11/2017    paniculectomy   • COLONOSCOPY N/A 3/27/2017    Procedure: COLONOSCOPY;  Surgeon: Osman Matt M.D.;  Location: SURGERY [facility] DAY [facility];  Service:    • GASTROSCOPY N/A 3/27/2017    Procedure: GASTROSCOPY;  Surgeon: Osman Matt M.D.;  Location: SURGERY [facility] DAY [facility];  Service:    • BREAST BIOPSY Left 2/21/2017    Procedure: BREAST BIOPSY - WIRE LOCALIZED EXCISONAL ;  Surgeon: Jane Zhao M.D.;  Location: SURGERY [facility] DAY [facility];  Service:    • LUNG BIOPSY OPEN  11/2016   • OTHER ABDOMINAL SURGERY      Gasric Sleeve   • BONE MARROW ASPIRATION  3/16/2015    Performed by Freddie Hi M.D. at ENDOSCOPY [facility]   • BONE MARROW BIOPSY, NDL/TROCAR  3/16/2015    Performed by Freddie Hi M.D. at ENDOSCOPY [facility]   • RECOVERY  3/31/2014    Performed by [facility] at SURGERY [facility]   • RECOVERY  3/24/2014    Performed by [facility]Recovery Surgery at SURGERY [facility] DAY [facility]   • RECOVERY  1/21/2014    Performed by [facility] at SURGERY SAME DAY [facility]   • BRONCHOSCOPY-ENDO  11/15/2013    Performed by Ha Perez M.D. at ENDOSCOPY [facility]   •  RECOVERY  2/27/2013    Performed by [facility] at SURGERY SAME DAY [facility]   • BONE MARROW ASPIRATION  12/31/2012    Performed by Josemanuel Real M.D. at ENDOSCOPY [facility]   • BONE MARROW BIOPSY, NDL/TROCAR  12/31/2012    Performed by Josemanuel Real M.D. at ENDOSCOPY [facility]   • GASTROSCOPY  9/28/2012    Performed by Aravind Richards M.D. at SURGERY [facility]   • SIGMOIDOSCOPY FLEX  9/26/2012    Performed by Kristopher Cardoso M.D. at ENDOSCOPY [facility]   • BRONCHOSCOPY-ENDO  6/19/2012    Performed by MARLENA MURILLO at ENDOSCOPY [facility]   • BRONCHOSCOPY-ENDO  5/29/2012    Performed by SUYAPA CAMARENA at ENDOSCOPY [facility]   • OTHER ABDOMINAL SURGERY  December 2011    Liver transplant at [facility] by Dr. Canada.   • GASTROSCOPY-ENDO  3/12/2010    Performed by CAMELIA SAMANO at ENDOSCOPY [facility]   • EXAM UNDER ANESTHESIA  11/11/2009    Performed by BIRD ABDI at SURGERY [facility]   • HEMORRHOIDECTOMY  11/11/2009    Performed by BIRD ABDI at SURGERY [facility]   • KELBY BY LAPAROSCOPY  9/19/2009    Performed by BIRD ABDI at SURGERY [facility]   • CARPAL TUNNEL RELEASE          • KELBY BY LAPAROSCOPY     • GASTRIC BYPASS LAPAROSCOPIC     • HERNIA REPAIR      x3   • HYSTERECTOMY, TOTAL ABDOMINAL          • OTHER      Breast reduction   • OTHER      liver transplant   • IN ANESTH,NOSE,SINUS SURGERY      x4   • TONSILLECTOMY          Allergies  Nsaids, Rhubarb, Organic nitrates, and Other drug     Social History:   Social History     Socioeconomic History   • Marital status:      Spouse name: Not on file   • Number of children: Not on file   • Years of education: Not on file   • Highest education level: Not on file   Occupational History   • Not on file   Tobacco Use   • Smoking status: Never Smoker   • Smokeless tobacco: Never Used   • Tobacco comment: avoid all tobacco products   Vaping Use   • Vaping Use:  Never used   Substance and Sexual Activity   • Alcohol use: Not Currently     Alcohol/week: 0.0 oz   • Drug use: No   • Sexual activity: Not on file   Other Topics Concern   • Primary/coprimary nurse & associates Not Asked   • Family contact information Not Asked   • OK to release patient information to the following Not Asked   • Patient preferred routine/Privacy concerns Not Asked   • Patient likes and dislikes Not Asked   • Participating In Research Study Not Asked   • Miscellaneous Not Asked   Social History Narrative   • Not on file     Social Determinants of Health     Financial Resource Strain:    • Difficulty of Paying Living Expenses: Not on file   Food Insecurity:    • Worried About Running Out of Food in the Last Year: Not on file   • Ran Out of Food in the Last Year: Not on file   Transportation Needs:    • Lack of Transportation (Medical): Not on file   • Lack of Transportation (Non-Medical): Not on file   Physical Activity:    • Days of Exercise per Week: Not on file   • Minutes of Exercise per Session: Not on file   Stress:    • Feeling of Stress : Not on file   Social Connections:    • Frequency of Communication with Friends and Family: Not on file   • Frequency of Social Gatherings with Friends and Family: Not on file   • Attends Congregational Services: Not on file   • Active Member of Clubs or Organizations: Not on file   • Attends Club or Organization Meetings: Not on file   • Marital Status: Not on file   Intimate Partner Violence:    • Fear of Current or Ex-Partner: Not on file   • Emotionally Abused: Not on file   • Physically Abused: Not on file   • Sexually Abused: Not on file   Housing Stability:    • Unable to Pay for Housing in the Last Year: Not on file   • Number of Places Lived in the Last Year: Not on file   • Unstable Housing in the Last Year: Not on file        Family History:   Family History   Problem Relation Age of Onset   • Other Father         Unknown (dead 10 years)   • Diabetes  Father    • Heart Disease Father    • Hypertension Father    • Hyperlipidemia Father    • Stroke Father    • Non-contributory Mother    • Hyperlipidemia Mother    • Alcohol/Drug Mother    • Cancer Paternal Aunt    • Alcohol/Drug Maternal Grandmother    • Alcohol/Drug Maternal Grandfather         Review of Systems:  General: No fevers, chills, positive for unintentional weight loss.  HEENT: No scleral icterus, gum bleed, dysphagia, odynophagia.  Cardiology: No chest pain, palpitations, orthopnea.  Respiratory: No dyspnea, cough, wheezing.  Gastrointestinal: +abdominal pain, nausea, vomiting, changes in bowel habits, positive for diarrhea, negative for rectal bleed.  Genitourinary: No hematuria, dysuria, urgency.  Neurologic: Positive for changes in memory, negative for confusion, gait instability.  Skin: No ecchymosis, jaundice, telangiectasia.    Physical Exam:  Vitals:    02/02/22 0721 02/02/22 0752 02/02/22 1026 02/02/22 1137   BP: 112/54      Pulse: 66      Resp: 18 18 18 18   Temp: 36.3 °C (97.3 °F)      TempSrc: Temporal      SpO2: 96%      Weight:       Height:         General: No acute cardiopulmonary distress.  Ill-appearing.  Pleasant.  Head: Normocephalic.  EENT: Scleral anicterus. Mucosa moist.   Respiratory: Breath sounds present. Symmetrical rise of anterior thorax.  Cardiovascular: Normal S1, S2.  Gastrointestinal: Soft, diffusely tender to palpation in all quadrants. Normoactive bowel sounds. No guarding.  Negative Quintero sign.  Unable to appreciate hepatosplenomegaly.  Neurologic: Alert and oriented.  Skin: Warm and dry.  Multiple scars in abdominal and chest area, well-healed.      LABS:  Lab Results   Component Value Date/Time    SODIUM 143 02/02/2022 03:40 AM    POTASSIUM 3.6 02/02/2022 03:40 AM    CHLORIDE 108 02/02/2022 03:40 AM    CO2 27 02/02/2022 03:40 AM    GLUCOSE 99 02/02/2022 03:40 AM    BUN 8 02/02/2022 03:40 AM    CREATININE 0.52 02/02/2022 03:40 AM      Lab Results   Component Value  Date/Time    WBC 1.8 (LL) 02/02/2022 03:40 AM    RBC 3.22 (L) 02/02/2022 03:40 AM    HEMOGLOBIN 8.2 (L) 02/02/2022 03:40 AM    HEMATOCRIT 27.6 (L) 02/02/2022 03:40 AM    MCV 85.7 02/02/2022 03:40 AM    MCH 25.5 (L) 02/02/2022 03:40 AM    MCHC 29.7 (L) 02/02/2022 03:40 AM    MPV 9.6 02/02/2022 03:40 AM    NEUTSPOLYS 49.10 02/02/2022 03:40 AM    LYMPHOCYTES 33.40 02/02/2022 03:40 AM    MONOCYTES 7.00 02/02/2022 03:40 AM    EOSINOPHILS 10.50 (H) 02/02/2022 03:40 AM    BASOPHILS 0.00 02/02/2022 03:40 AM    HYPOCHROMIA 1+ 08/05/2014 08:16 AM    ANISOCYTOSIS 1+ 02/02/2022 03:40 AM        Lab Results   Component Value Date/Time    PROTHROMBTM 13.8 12/25/2021 07:40 PM    INR 1.15 (H) 12/25/2021 07:40 PM      Recent Labs     01/31/22  1553 02/01/22  0244 02/02/22  0340   ASTSGOT 32 39 26   ALTSGPT 23 22 20   TBILIRUBIN <0.2 0.2 0.2   GLOBULIN 2.0 1.2* 1.9       IMAGING: Reviewed personally and summarized in HPI.    Principal Problem:    Colitis POA: Yes  Active Problems:    Status post liver transplant Dr. Canada ([facility]) POA: Yes      Overview: -December 2011: Status post liver transplant for end stage liver disease       at [facility], followed by Dr. Canada.                History of Clostridium difficile infection POA: Yes    Primary pulmonary hypertension (HCC) POA: Yes    Abdominal pain POA: Unknown    Seizures (HCC) POA: Yes    Moderate protein-calorie malnutrition (HCC) POA: Yes    Liver lesion POA: Unknown    Ileus (HCC) POA: Unknown  Resolved Problems:    * No resolved hospital problems. *          ASSESSMENT:   This is a 61-year-old female with an extensive past medical history significant for OLT (2011), history of multiple bariatric surgeries, pulmonary hypertension, COPD, stage III CKD, MGUS, pancytopenia.   Electrolyte levels are optimal per chart review.  At this point, the differential includes: Elevated tacrolimus levels creating nausea/vomiting as a side effect, reaction to opioid use, possible stricture  at the area of patient's previous gastric bypass, malignancy, reflux, or other.    GI would like to perform an EGD to further assess the patient's cause of nausea/vomiting.  We have discussed this procedure with the patient extensively, including the risks and benefits.  The patient expressed understanding and consented to proceeding with an EGD      PLAN:   -N.p.o. at midnight  -EGD scheduled for tomorrow in the afternoon  -Ordered a tacrolimus trough level at 3 AM (just before patient takes a.m. dose of tacrolimus)  -PT/OT  -Monitor and replete electrolytes, goal of magnesium greater than 2, phosphorus greater than 3, potassium greater than 4  -Antiemetics as needed    Thank you for the consultation. Please call with any questions or concerns.    Tami Cheek M.D.  Internal Medicine Resident  [facility]

## 2022-02-03 NOTE — ANESTHESIA TIME REPORT
Anesthesia Start and Stop Event Times     Date Time Event    2/3/2022 1522 Ready for Procedure     1533 Anesthesia Start     1556 Anesthesia Stop        Responsible Staff  02/03/22    Name Role Begin End    Huber Zee M.D. Anesth 1533 1556        Preop Diagnosis (Free Text):  Pre-op Diagnosis     nausea/vomiting/abominal pain         Preop Diagnosis (Codes):    Premium Reason  Non-Premium    Comments:

## 2022-02-03 NOTE — CARE PLAN
The patient is Stable - Low risk of patient condition declining or worsening    Shift Goals  Clinical Goals: Pain management     Progress made toward(s) clinical / shift goals: Pain managed with PRN pain medications - administered per MAR.       Problem: Pain - Standard  Goal: Alleviation of pain or a reduction in pain to the patient’s comfort goal  Outcome: Progressing     Problem: Knowledge Deficit - Standard  Goal: Patient and family/care givers will demonstrate understanding of plan of care, disease process/condition, diagnostic tests and medications  Outcome: Progressing       Patient is not progressing towards the following goals:

## 2022-02-03 NOTE — PROGRESS NOTES
Assumed care of patient this shift. Patient is alert and oriented x 4.  Able to make her needs known. Complained of severe 10/10 pain, medicated with PRN pain medication per MAR. Fall prevention tactics in place, bed locked and in lowest position and non-skid footwear in use. Patient continues to refuse bed alarm despite education. Continued to encourage patient to call for assistance when getting up out of bed, call light is within reach.

## 2022-02-03 NOTE — FACE TO FACE
Face to Face Supporting Documentation - Home Health    The encounter with this patient was in whole or in part the primary reason for home health admission.    Date of encounter:   Patient:                    MRN:                       YOB: 2022  Roxana uCba  [id number]  1960     Home health to see patient for:  Skilled Nursing care for assessment, interventions & education, Physical Therapy evaluation and treatment and Occupational therapy evaluation and treatment    Skilled need for:  Comment: skilled/PT/OT    Skilled nursing interventions to include:  Comment: skilled/PT/OT    Homebound status evidenced by:  Need the aid of supportive devices such as crutches, canes, wheelchairs or walkers. Leaving home requires a considerable and taxing effort. There is a normal inability to leave the home.    Community Physician to provide follow up care: Elisa Phillip M.D.     Optional Interventions? No      I certify the face to face encounter for this home health care referral meets the CMS requirements and the encounter/clinical assessment with the patient was, in whole, or in part, for the medical condition(s) listed above, which is the primary reason for home health care. Based on my clinical findings: the service(s) are medically necessary, support the need for home health care, and the homebound criteria are met.  I certify that this patient has had a face to face encounter by myself.  Kimberly Burr M.D. - NPI: [id number]

## 2022-02-03 NOTE — DISCHARGE PLANNING
TCN following. HTH/SCP chart review completed. Collaborated with Latoya RAYMOND. As prior, per TCN note and chart review, pt refusing IRF/SNF placement at time of dc. Would note that pt does now have 6 clicks mobility score of 22 indicating very limited mobility issues. She appears to have been reluctantly agreeable to HH and GSC services which would likely benefit pt given hx of readmissions (that appear in part related to nausea, vomiting and abdominal pain). Will need HH order and Latoya RAYMOND aware. Will continue to monitor and note that pt does not appear medically cleared for dc at this time.      Previously completed:  - Choice forms: HH, DME (refused IRF/SNF choice)  - GSC introduced (Y), referral (sent).

## 2022-02-03 NOTE — PROGRESS NOTES
Patient is high fall risk. Educated patient on fall risk and importance of calling for assistance. Fall prevention tactics in place, bed locked and in lowest position and non-skid footwear in use. Patient is continuing to refuse bed alarm despite education.

## 2022-02-03 NOTE — CARE PLAN
The patient is Stable - Low risk of patient condition declining or worsening    Shift Goals  Clinical Goals: Patient's pain will be controlled      Problem: Knowledge Deficit - Standard  Goal: Patient and family/care givers will demonstrate understanding of plan of care, disease process/condition, diagnostic tests and medications  Outcome: Progressing     Problem: Fall Risk  Goal: Patient will remain free from falls  Outcome: Progressing       Patient is not progressing towards the following goals:  Patient complained of severe pain throughout shift, medicated with PRN pain medications per MAR. MD notified regarding patient request for increase in pain medications. Pain medication regimen adjusted per MD.     Problem: Pain - Standard  Goal: Alleviation of pain or a reduction in pain to the patient’s comfort goal  Outcome: Not Progressing

## 2022-02-03 NOTE — PROGRESS NOTES
Gastroenterology Progress Note     Author: Tami Cheek M.D.   Date & Time Created: 2/3/2022 10:12 AM    Chief Complaint:  Nausea, vomiting, diarrhea, abdominal pain    ID:  61-year-old female with an extensive past medical history significant for OLT (2011), history of multiple bariatric surgeries, pulmonary hypertension, COPD, stage III CKD, MGUS, pancytopenia.  GI has been consulted for the patient's intractable vomiting, diarrhea, and nausea.    Interval History:  No acute events overnight.  This a.m., patient states that she is ready for her EGD.  Still cites persistent nausea and vomiting, unable to keep food down.    Review of Systems:  Review of Systems   Constitutional: Positive for malaise/fatigue. Negative for chills and fever.   HENT: Negative for congestion and sore throat.    Eyes: Negative for blurred vision and double vision.   Respiratory: Negative for cough, shortness of breath and wheezing.    Cardiovascular: Negative for chest pain, palpitations and leg swelling.   Gastrointestinal: Positive for abdominal pain, diarrhea, heartburn, nausea and vomiting. Negative for blood in stool and constipation.   Genitourinary: Negative for dysuria, frequency and urgency.   Musculoskeletal: Negative for falls and myalgias.   Neurological: Positive for weakness. Negative for dizziness, speech change, seizures and headaches.   Psychiatric/Behavioral: Positive for memory loss. Negative for substance abuse and suicidal ideas. The patient is not nervous/anxious.          Current Facility-Administered Medications:   •  magnesium oxide (MAG-OX) tablet 400 mg, 400 mg, Oral, DAILY, Kimberly Burr M.D.  •  oxyCODONE immediate release (ROXICODONE) tablet 10 mg, 10 mg, Oral, Q6HRS PRN, Kimberly Burr M.D., 10 mg at 02/03/22 0505  •  [DISCONTINUED] oxyCODONE immediate-release (ROXICODONE) tablet 2.5 mg, 2.5 mg, Oral, Q3HRS PRN **OR** [DISCONTINUED] oxyCODONE immediate-release (ROXICODONE) tablet 5 mg, 5 mg,  Oral, Q3HRS PRN, 5 mg at 02/02/22 1026 **OR** HYDROmorphone (Dilaudid) injection 0.25 mg, 0.25 mg, Intravenous, Q6HRS PRN, Kimberly Burr M.D., 0.25 mg at 02/02/22 2239  •  senna-docusate (PERICOLACE or SENOKOT S) 8.6-50 MG per tablet 2 Tablet, 2 Tablet, Oral, BID, 2 Tablet at 02/02/22 0617 **AND** polyethylene glycol/lytes (MIRALAX) PACKET 1 Packet, 1 Packet, Oral, QDAY PRN **AND** magnesium hydroxide (MILK OF MAGNESIA) suspension 30 mL, 30 mL, Oral, QDAY PRN **AND** bisacodyl (DULCOLAX) suppository 10 mg, 10 mg, Rectal, QDAY PRN, Fredrick Xiao M.D.  •  NS infusion, , Intravenous, Continuous, Fredrick Xiao M.D., Last Rate: 75 mL/hr at 02/02/22 2107, New Bag at 02/02/22 2107  •  enoxaparin (LOVENOX) inj 40 mg, 40 mg, Subcutaneous, DAILY, Fredrick Xiao M.D., 40 mg at 02/03/22 0505  •  acetaminophen (Tylenol) tablet 650 mg, 650 mg, Oral, Q6HRS PRN, Fredrick Xiao M.D.  •  Notify provider if pain remains uncontrolled, , , CONTINUOUS **AND** Use the Numeric Rating Scale (NRS), Jimenez-Baker Faces (WBF), or FLACC on regular floors and Critical-Care Pain Observation Tool (CPOT) on ICUs/Trauma to assess pain, , , CONTINUOUS **AND** Pulse Ox, , , CONTINUOUS **AND** Pharmacy Consult Request ...Pain Management Review 1 Each, 1 Each, Other, PHARMACY TO DOSE **AND** If patient difficult to arouse and/or has respiratory depression (respiratory rate of 10 or less), stop any opiates that are currently infusing and call a Rapid Response., , , CONTINUOUS, Fredrick Xiao M.D.  •  labetalol (NORMODYNE/TRANDATE) injection 10 mg, 10 mg, Intravenous, Q4HRS PRN, Fredrick Xiao M.D.  •  ondansetron (ZOFRAN) syringe/vial injection 4 mg, 4 mg, Intravenous, Q4HRS PRN, Fredrick Xiao M.D., 4 mg at 02/02/22 1314  •  ondansetron (ZOFRAN ODT) dispertab 4 mg, 4 mg, Oral, Q4HRS PRN, Fredrick Xiao M.D., 4 mg at 02/03/22 0633  •  promethazine (PHENERGAN) tablet 12.5-25 mg, 12.5-25 mg, Oral, Q4HRS PRN, Fredrick Xiao  M.D.  •  promethazine (PHENERGAN) suppository 12.5-25 mg, 12.5-25 mg, Rectal, Q4HRS PRN, Fredrick Xiao M.D.  •  prochlorperazine (COMPAZINE) injection 5-10 mg, 5-10 mg, Intravenous, Q4HRS PRN, Fredrick Xiao M.D.  •  albuterol inhaler 2 Puff, 2 Puff, Inhalation, Q4H PRN (RT), Fredrick Xiao M.D.  •  ambrisentan (LETAIRIS) TABS 10 mg, 10 mg, Oral, DAILY, Fredrick Xiao M.D., 10 mg at 02/03/22 0600  •  fludrocortisone (FLORINEF) tablet 0.1 mg, 0.1 mg, Oral, DAILY, Fredrick Xiao M.D., 0.1 mg at 02/03/22 0505  •  gabapentin (NEURONTIN) capsule 100 mg, 100 mg, Oral, BID, Fredrick Xiao M.D., 100 mg at 02/03/22 0505  •  lacosamide (VIMPAT) tablet 100 mg, 100 mg, Oral, BID, Fredrick Xiao M.D., 100 mg at 02/03/22 0506  •  levETIRAcetam (KEPPRA) tablet 1,500 mg, 1,500 mg, Oral, BID, Fredrick Xiao M.D., 1,500 mg at 02/03/22 0505  •  levothyroxine (SYNTHROID) tablet 137 mcg, 137 mcg, Oral, AM ES, Fredrick Xiao M.D., 137 mcg at 02/03/22 0505  •  mycophenolate (CELLCEPT) capsule 250 mg, 250 mg, Oral, BID, Fredrick Xiao M.D., 250 mg at 02/03/22 0506  •  omeprazole (PRILOSEC) capsule 40 mg, 40 mg, Oral, DAILY, Fredrick Xiao M.D., 40 mg at 02/03/22 0505  •  pravastatin (PRAVACHOL) tablet 20 mg, 20 mg, Oral, DAILY, Fredrick Xiao M.D., 20 mg at 02/03/22 0505  •  sertraline (Zoloft) tablet 100 mg, 100 mg, Oral, DAILY, Fredrick Xiao M.D., 100 mg at 02/03/22 0506  •  tacrolimus (PROGRAF) capsule 2 mg, 2 mg, Oral, BID, Fredrick Xiao M.D., 2 mg at 02/03/22 0505  •  tadalafil (CIALIS) TABS 20 mg, 20 mg, Oral, DAILY, Fredrick Xiao M.D., 20 mg at 02/03/22 0600  •  therapeutic multivitamin-minerals (THERAGRAN-M) tablet 1 Tablet, 1 Tablet, Oral, Q EVENING, Fredrick Xiao M.D., 1 Tablet at 02/02/22 1721  •  traZODone (DESYREL) tablet 50 mg, 50 mg, Oral, QHS, Fredrick Xiao M.D., 50 mg at 02/02/22 2104     Physical Exam:  Vitals:    02/03/22 0711   BP: 126/54   Pulse: 76   Resp: 18    Temp: 36.3 °C (97.4 °F)   SpO2: 100%        Physical Exam  Vitals and nursing note reviewed.   Constitutional:       General: She is not in acute distress.     Appearance: Normal appearance. She is normal weight. She is ill-appearing. She is not toxic-appearing or diaphoretic.   HENT:      Head: Normocephalic and atraumatic.      Right Ear: External ear normal.      Left Ear: External ear normal.      Nose: Nose normal. No congestion or rhinorrhea.      Mouth/Throat:      Mouth: Mucous membranes are dry.      Pharynx: No oropharyngeal exudate or posterior oropharyngeal erythema.   Eyes:      General:         Right eye: No discharge.         Left eye: No discharge.      Pupils: Pupils are equal, round, and reactive to light.   Cardiovascular:      Rate and Rhythm: Normal rate and regular rhythm.      Pulses: Normal pulses.      Heart sounds: No murmur heard.      Pulmonary:      Effort: Pulmonary effort is normal. No respiratory distress.      Breath sounds: No wheezing or rales.   Abdominal:      General: Abdomen is flat. Bowel sounds are normal. There is no distension.      Palpations: Abdomen is soft.      Tenderness: There is abdominal tenderness. There is no right CVA tenderness, left CVA tenderness or guarding.   Musculoskeletal:         General: No swelling or tenderness. Normal range of motion.      Cervical back: Normal range of motion and neck supple. No rigidity.      Right lower leg: No edema.      Left lower leg: No edema.   Skin:     General: Skin is warm and dry.      Capillary Refill: Capillary refill takes less than 2 seconds.      Coloration: Skin is pale.   Neurological:      General: No focal deficit present.      Mental Status: She is alert and oriented to person, place, and time.      Cranial Nerves: No cranial nerve deficit.      Sensory: No sensory deficit.      Motor: No weakness.   Psychiatric:         Mood and Affect: Mood normal.         Behavior: Behavior normal.         Thought  Content: Thought content normal.         Judgment: Judgment normal.         Labs:          Recent Labs     02/01/22 0244 02/02/22 0340 02/03/22 0220   SODIUM 141 143 143   POTASSIUM 3.4* 3.6 3.8   CHLORIDE 103 108 110   CO2 29 27 29   BUN 14 8 6*   CREATININE 0.52 0.52 0.53   MAGNESIUM  --  1.6  --    PHOSPHORUS  --  4.2  --    CALCIUM 8.2* 8.4* 8.0*     Recent Labs     01/31/22  1553 01/31/22  1553 02/01/22 0244 02/02/22 0340 02/03/22 0220   ALTSGPT 23   < > 22 20 19   ASTSGOT 32   < > 39 26 29   ALKPHOSPHAT 68   < > 50 46 42   TBILIRUBIN <0.2   < > 0.2 0.2 <0.2   LIPASE 12  --   --   --   --    GLUCOSE 128*   < > 92 99 94    < > = values in this interval not displayed.     Recent Labs     02/01/22 0244 02/02/22 0340 02/03/22 0223   RBC 2.99* 3.22* 3.00*   HEMOGLOBIN 7.7* 8.2* 8.0*   HEMATOCRIT 25.2* 27.6* 26.3*   PLATELETCT 94* 87* 83*     Recent Labs     02/01/22 0244 02/02/22 0340 02/03/22 0220 02/03/22 0223   WBC 2.5* 1.8*  --  1.7*   NEUTSPOLYS 45.70 49.10  --  31.10*   LYMPHOCYTES 40.80 33.40  --  48.50*   MONOCYTES 9.00 7.00  --  12.00   EOSINOPHILS 4.10 10.50*  --  7.20*   BASOPHILS 0.40 0.00  --  0.60   ASTSGOT 39 26 29  --    ALTSGPT 22 20 19  --    ALKPHOSPHAT 50 46 42  --    TBILIRUBIN 0.2 0.2 <0.2  --        Imaging:  MR-ABDOMEN-WITH & W/O   Final Result         1.  Ill-defined hypointense lesion in the liver is likely related to prior surgery. There are surgical clips adjacent to the lesion. It is unchanged from prior exams dating back to 2018.      2.  Suggestion of portal hypertension with portal venous distention, small amount of ascites, and esophageal varices      3.  Multiple splenic cysts as seen on prior exams.      4.  Colonic wall thickening in the partially visualized bowel suggests colitis.                           CT-ABDOMEN-PELVIS WITH   Final Result         1.  Fluid-filled reactive small bowel loops in left midabdomen suggests ileus and/or enteritis, recommend  radiographic follow-up to resolution to exclude progression to obstructive changes as clinically appropriate.   2.  Low-density lesion along the inferior margin of the liver, indeterminate and new since prior studies in 2018, recommend follow-up 3 phase CT liver or MRI of the liver with contrast for further characterization.   3.  Small hiatal hernia   4.  Low-density splenic lesions, appearance favors splenic cyst.   5.  Atherosclerosis           Assessment:  61-year-old female with an extensive past medical history significant for OLT (2011), history of multiple bariatric surgeries, pulmonary hypertension, COPD, stage III CKD, MGUS, pancytopenia.    GI has been consulted for the patient's intractable vomiting, diarrhea, and nausea. Scheduled to undergo EGD 2/3/2022.      Plan:  -EGD scheduled for today in the afternoon  -Follow up on tacro trough level  -PT/OT  -Monitor and replete electrolytes, goal of magnesium greater than 2, phosphorus greater than 3, potassium greater than 4  -Antiemetics as needed     Thank you for the consultation. Please call with any questions or concerns.     Tami Cheek M.D.  Internal Medicine Resident  [facility]

## 2022-02-03 NOTE — PROGRESS NOTES
"Assumed care of pt after receiving report from Day shift RN. Pt is A&Ox 4 on 4L O2 via NC. Pt c/o 9/10 pain in abdomen - medicated pt per MAR. Pt irritable with pain management regimen stating \"Im in pain, its not funny\". Educated pt on pain medications orders, pt verbalized understanding. Pt up to bathroom with steady gait and SB assist. Pt refusing bed alarm despite education - charge RN notified. Bed is locked and in lowest position, call light within reach, fall precautions in place. All needs met at this time.   "

## 2022-02-03 NOTE — ANESTHESIA POSTPROCEDURE EVALUATION
Patient: Roxana Cuba    Procedure Summary     Date: 02/03/22 Room / Location: [facility] ROOM 26 / SURGERY SAME DAY [facility]    Anesthesia Start: 1533 Anesthesia Stop: 1556    Procedure: GASTROSCOPY (N/A Esophagus) Diagnosis: (normal egd )    Surgeons: Aravind Richards M.D. Responsible Provider: Huber Zee M.D.    Anesthesia Type: general ASA Status: 3          Final Anesthesia Type: general  Last vitals  BP   Blood Pressure: 120/59    Temp   36.7 °C (98 °F)    Pulse   60   Resp   15    SpO2   100 %      Anesthesia Post Evaluation    Patient location during evaluation: PACU  Patient participation: complete - patient participated  Level of consciousness: awake and alert  Pain score: 2    Airway patency: patent  Anesthetic complications: no  Cardiovascular status: hemodynamically stable  Respiratory status: acceptable  Hydration status: euvolemic    PONV: none          There were no known complications for this encounter.     Nurse Pain Score: 9 (NPRS)

## 2022-02-03 NOTE — PROGRESS NOTES
Finn from Lab called with critical result of WBC at 1.7. Critical lab result read back to Finn.   Estee LÓPEZ notified of critical lab result at 0309.

## 2022-02-03 NOTE — OR NURSING
1551 - Pt to PACU QR2 from OR.  Bedside report from anesthesiologist and RN.  Attached to monitoring, VSS, breathing is calm and unlabored. 4L mask.  No signs of pain or nausea, oral airway present.     1557 - Oral airway removed, no signs of pain or nausea, remains on 4L mask.     1624 - Pt had a cracker and some water, tolerating at this time.  On 4L NC, other VSS.     1635 - Pt taken back to room with RN escort on 4L oxygen.  Pt able to ambulate to bed, RN Lovely present when patient arrived and took report.

## 2022-02-03 NOTE — ANESTHESIA PREPROCEDURE EVALUATION
Case: [id number] Date/Time: 02/03/22 1605    Procedure: GASTROSCOPY    Location: [facility] ROOM 26 / SURGERY SAME DAY [facility]    Surgeons: Aravind Richards M.D.          Relevant Problems   PULMONARY   (positive) History of Clostridium difficile infection   (positive) History of infection with vancomycin resistant Enterococcus (VRE)      NEURO   (positive) H/O non-ST elevation myocardial infarction (NSTEMI)   (positive) History of Clostridium difficile infection   (positive) History of aspiration pneumonia   (positive) History of infection with vancomycin resistant Enterococcus (VRE)   (positive) History of pancreatitis   (positive) History of pneumonia   (positive) History of small bowel obstruction   (positive) Seizures (HCC)      CARDIAC   (positive) Hepatopulmonary syndrome (HCC)   (positive) Mild aortic regurgitation and aortic valve sclerosis   (positive) Mild mitral regurgitation   (positive) Primary pulmonary hypertension (HCC)      GI   (positive) GERD (gastroesophageal reflux disease)   (positive) Hiatal hernia         (positive) Hepatopulmonary syndrome (HCC)      ENDO   (positive) Acquired hypothyroidism      Other   (positive) Splenic lesion   (positive) Splenomegaly       Physical Exam    Airway   Mallampati: II  TM distance: >3 FB  Neck ROM: full       Cardiovascular - normal exam  Rhythm: regular  Rate: normal  (-) murmur     Dental - normal exam           Pulmonary - normal exam  Breath sounds clear to auscultation     Abdominal    Neurological - normal exam                 Anesthesia Plan    ASA 3   ASA physical status 3 criteria: CAD/stents (> 3 months)    Plan - general       Airway plan will be ETT          Induction: intravenous    Postoperative Plan: Postoperative administration of opioids is intended.    Pertinent diagnostic labs and testing reviewed    Informed Consent:    Anesthetic plan and risks discussed with patient.    Use of blood products discussed with: patient whom consented to blood  products.

## 2022-02-03 NOTE — ANESTHESIA PROCEDURE NOTES
Airway    Date/Time: 2/3/2022 3:37 PM  Performed by: Huber Zee M.D.  Authorized by: Huber Zee M.D.     Location:  OR  Urgency:  Elective  Indications for Airway Management:  Anesthesia      Spontaneous Ventilation: absent    Sedation Level:  Deep  Preoxygenated: Yes    Patient Position:  Sniffing  Final Airway Type:  Endotracheal airway  Final Endotracheal Airway:  ETT  Cuffed: Yes    Technique Used for Successful ETT Placement:  Direct laryngoscopy    Insertion Site:  Oral  Blade Type:  Rodriguez  Laryngoscope Blade/Videolaryngoscope Blade Size:  2  ETT Size (mm):  7.0  Measured from:  Teeth  ETT to Teeth (cm):  22  Placement Verified by: auscultation and capnometry    Cormack-Lehane Classification:  Grade I - full view of glottis  Number of Attempts at Approach:  1

## 2022-02-04 PROBLEM — K52.9 COLITIS: Status: RESOLVED | Noted: 2022-01-01 | Resolved: 2022-01-01

## 2022-02-04 PROBLEM — K76.9 LIVER LESION: Status: RESOLVED | Noted: 2022-01-01 | Resolved: 2022-01-01

## 2022-02-04 PROBLEM — K56.7 ILEUS (HCC): Status: RESOLVED | Noted: 2022-01-01 | Resolved: 2022-01-01

## 2022-02-04 NOTE — THERAPY
Physical Therapy     Patient Name: Roxana Cuba  Age:  61 y.o., Sex:  female  Medical Record #: [id number]  Today's Date: 2/4/2022    PT Consult received/acknowledged. Spoke with pt at bedside, she reports she has been up self in room without need for AD. Also noted to have 6-clicks of 22/24. She declined mobility concerns at this time. Acute PT assessment not warranted at this time.    Luiza Fernandez, PT, DPT  Ext. [phone]

## 2022-02-04 NOTE — DISCHARGE PLANNING
ATTN: Case Management  RE: Referral for Home Health    As of 02/04/2022, we have accepted the Home Health referral for the patient listed above.    A [facility] clinician will be out to see the patient within 48 hours. If you have any questions or concerns regarding the patient's transition to Home Health, please do not hesitate to contact us at [phone].      We look forward to collaborating with you,  [facility] Team

## 2022-02-04 NOTE — OP REPORT
DATE OF SERVICE:  02/03/2022     PROCEDURE PERFORMED:  Esophagogastroduodenoscopy.     PREOPERATIVE DIAGNOSES:  Nausea and vomiting.     POSTOPERATIVE DIAGNOSES:  Normal esophagogastroduodenoscopy in the setting of   a history of gastric bypass with a normal afferent and efferent limb with the   residual stomach measuring 4-5 cm with the anastomosis of each area within   normal limits without pathology.     PHYSICIAN:  Aravind Richards MD     ANESTHESIOLOGIST:  Huber Zee MD     MEDICATION:  General anesthesia.     INDICATIONS:  Procedure risks and benefits reviewed thoroughly with the   patient.  Risks include but are not limited to bleeding, perforation, side   effects of medications were informed.  The patient voiced understanding and   agreed to proceed.     DESCRIPTION OF PROCEDURE:  The patient was placed in left lateral decubitus   position.  After sedation was achieved, bite block was inserted in the mouth   and a forward viewing gastroscope was passed carefully and easily under direct   visualization in the esophagus.  All esophageal views were normal.  The   stomach was in a post-gastric bypass state and appeared extremely healthy.    There was no evidence for any esophagitis.  The gastrojejunal anastomosis of   the afferent and efferent limbs were completely normal.  Examination of the   afferent limb to approximately 30 cm, distal was also normal.  There was no   scalloping.  There were no erosions, ulcerations, no endoscopic abnormality or   pathology.  The stomach was suctioned, desufflated and scope was removed.     COMPLICATIONS:  None.     ESTIMATED BLOOD LOSS:  None.     SPECIMENS:  None.     RECOMMENDATIONS:  The patient presents with a reported history of a 30-pound   weight loss over 30 days with a normal albumin and normal electrolytes.  She   states that she vomits all her food, but yet her esophagus was normal.  The   discussion with the patient prior to the endoscopy did reveal that she  has a   significant amount of reflux if she is not careful with what she eats and I   query whether or not she is careful with the overall volume of food consumed   that could elicit a gastric overflow and then reflux and subsequent vomiting   and/or acid brash.  This was discussed with the patient prior to the procedure   and afterwards. She voiced understanding the importance of dietary changes to   prevent reflux component of her symptoms to improve.  We do note, the patient   has a history of autologous liver transplantation and is on Prograf and we   are ruling out Prograf toxicity with a Prograf level, which is pending.    Further recommendations to follow upon clinical evolution over time.  A diet   of clears will be initiated and advanced as tolerated.        ______________________________  MD JULIA Herrmann/JOSE    DD:  02/03/2022 16:51  DT:  02/03/2022 17:12    Job#:  [id number]

## 2022-02-04 NOTE — DISCHARGE PLANNING
[facility] following. HTH/SCP chart review completed. Note PT and OT report no needs per screening/visit with pt. However given hx of readmission, note that pt has HH choice an HH order and appears plan is to dc to home with HH services (accepted) and [facility] in hopes of prevention of readmission and appropriate management after dc. Please reach out to [facility] if any additional needs for dc planning arise.     Previously completed:  - Choice forms: HH, DME (refused IRF/SNF choice)  - [facility] introduced (Y), referral (sent).

## 2022-02-04 NOTE — DOCUMENTATION QUERY
"                                                                         [facility]                                                                       Query Response Note      PATIENT:               VICK LI  ACCT #:                  [id number]  MRN:                     [id number]  :                      1960  ADMIT DATE:       2022 5:34 PM  DISCH DATE:          RESPONDING  PROVIDER #:        [id number]           QUERY TEXT:    Severe malnutrition is documented in the Registered Dietician evaluation.  Progress notes state \"moderate\" malnutrition. Please specify if you:    The patient's Clinical Indicators include:  BMI 19     H/P- Moderate protein-calorie malnutrition   No dietary restrictions.   Supplements   Dietitian consult      Dietary-  Malnutrition Risk: Severe chronic disease related malnutrition as evidenced by >12% wt loss  in less than 3 months, severe muscle wasting noted on observation.  Temporal wasting noted.      Op Report- The patient presents with a reported history of a 30-pound weight loss over 30 days with a normal albumin and normal electrolytes.  She states that she vomits all her food      Thank you,  T. Alyssa Yoon RN, BSN, CCDS  Connect via Voalte  Options provided:   -- Agree with dietician assessment of severe malnutrition   -- Disagree with dietician assessment of severe malnutrition   -- Other: please specify   -- Unable to clinically determine      Query created by: Isabel Yoon on 2022 1:22 PM    RESPONSE TEXT:    Agree with dietician assessment of severe malnutrition          Electronically signed by:  JAQUELIN RILEY MD 2022 2:35 PM              "

## 2022-02-04 NOTE — CARE PLAN
The patient is Stable - Low risk of patient condition declining or worsening    Shift Goals  Clinical Goals: pain control, IV access  Patient Goals: pain control    Progress made toward(s) clinical / shift goals:  Pain being controlled with PRN pain medications. Pt educated on using oxy vs dilaudid. IV access ws gained using US IV by qualified RN.      Problem: Pain - Standard  Goal: Alleviation of pain or a reduction in pain to the patient’s comfort goal  Outcome: Progressing     Problem: Knowledge Deficit - Standard  Goal: Patient and family/care givers will demonstrate understanding of plan of care, disease process/condition, diagnostic tests and medications  Outcome: Progressing       Patient is not progressing towards the following goals:

## 2022-02-04 NOTE — DIETARY
Brief Nutrition Update: Patient is now on a renal diet.  Per ADLs she ate 50-75% of breakfast today.  Patient reported despite diet being advanced, she is being discharged today but is still in severe pain and is stated she is not satisfied with pain resolution. Offered emotional understanding.  RD available PRN.

## 2022-02-04 NOTE — PROGRESS NOTES
Bedside report received.  Assessment complete.  A&O x 4. Patient calls appropriately.  Patient ambulates with x1 assist.  Patient has 9/10 pain. Pain managed with prescribed medications.  Denies N&V. Tolerating renal diet.  + void, + flatus, - BM.  Patient denies SOB.  Review plan with of care with patient. Call light and personal belongings within reach. Hourly rounding in place. All needs met at this time.

## 2022-02-04 NOTE — PROGRESS NOTES
Hospital Medicine Daily Progress Note    Date of Service  2/3/2022    Chief Complaint  Roxana Cuba is a 61 y.o. 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  Patient stated that she has been having constant periumbilical pain that is usually aggravated with p.o. intake.  This associated with nausea and vomiting 30 minutes after any meal.  She admits to losing almost 30 pounds over the last 2-months.  She has been trying to make an appointment with outpatient GI but was unable to get any sooner appointment so she presented to ER for further evaluation.  In ER noted to be hemodynamically stable.  Afebrile.  Respiratory status at baseline with oxygen demands.  Lab work was significant for pancytopenia.  UA was negative for UTI.  Lactic acid was within normal limits.  Lipase was within normal limits.  CT scan of the abdomen pelvis suggested ileus versus enteritis.  There was low-density lesion along the inferior margin of the liver measured 1.6 cm that is new since prior study in 2018.  MRI of the abdomen was done which showed ill-defined hypointense lesion in the liver likely related to prior surgery with colonic wall thickening in the partially visualized bowel suggesting colitis.  Findings discussed with GI.  Considering her provoked  abdominal pain associate with nausea and vomiting, GI suggested upper endoscopy.  Interval Problem Update  2/2: Recently a couple.  No bowel movement since last night.  Afebrile.  Tolerating liquid diet.  Still having abdominal pain.  MRI findings discussed with patient.  GI consulted.  Planning for possible EGD in the a.m.  We will keep n.p.o. at midnight.  2/3: Resting in bed comfortably.  Still complaining of abdominal pain.  Denies any nausea vomiting this morning.  Afebrile peer hemodynamically stable.  Has been n.p.o. since midnight for EGD this morning.    I have personally seen and examined the patient at bedside. I discussed the plan of care with patient.    Consultants/Specialty  GI Dr Richards    Code Status  Full Code    Disposition  Pending at this time  Review of Systems   Constitutional: Positive for malaise/fatigue and weight loss. Negative for fever.   HENT: Negative for hearing loss and tinnitus.    Eyes: Negative for blurred vision and double vision.   Respiratory: Negative for cough and hemoptysis.    Cardiovascular: Negative for chest pain.   Gastrointestinal: Positive for abdominal pain, nausea and vomiting. Negative for blood in stool and melena.   Genitourinary: Negative for dysuria.   Musculoskeletal: Negative for myalgias and neck pain.   Skin: Negative for rash.   Neurological: Negative for dizziness and headaches.   Psychiatric/Behavioral: Negative for depression.       Physical Exam  Temp:  [36.2 °C (97.2 °F)-36.7 °C (98 °F)] 36.7 °C (98 °F)  Pulse:  [52-76] 60  Resp:  [12-19] 13  BP: (120-140)/(52-65) 126/61  SpO2:  [98 %-100 %] 99 %    General: No acute distress  HEENT atraumatic, normocephalic, pupils equal round reactive to light  Neck: No JVD  Chest: Respirations are unlabored  Cardiac: Physiologic S1 and S2  Abdomen: Soft, nontender, nondistended  Extremities: Without clubbing, cyanosis or edema  Neuro: Cranial nerves II through XII are grossly intact.  Psych: No anxiety, judgement  intact.          Current Facility-Administered Medications:   •  [MAR Hold] magnesium oxide (MAG-OX) tablet 400 mg, 400 mg, Oral, DAILY, Kmiberly Burr M.D., 400 mg at 02/03/22 1117  •  [DISCONTINUED] oxyCODONE immediate-release (ROXICODONE) tablet 2.5 mg, 2.5 mg, Oral, Q3HRS PRN **OR** [DISCONTINUED] oxyCODONE immediate-release (ROXICODONE) tablet 5 mg, 5 mg, Oral, Q3HRS PRN, 5 mg at 02/02/22 1026 **OR** [MAR Hold] HYDROmorphone (Dilaudid) injection 0.25 mg, 0.25 mg, Intravenous, Q4HRS PRN, Kimberly Burr M.D.  •  [MAR Hold] gabapentin (NEURONTIN) capsule 100 mg, 100 mg, Oral, TID, Kimberly Burr M.D.  •  [MAR Hold] oxyCODONE immediate-release (ROXICODONE) tablet 5 mg, 5 mg, Oral, Q4HRS PRN **OR** [MAR Hold] oxyCODONE CR (OXYCONTIN) tablet 10 mg, 10 mg, Oral, Q4HRS PRN, Kimberly Burr M.D.  •  ondansetron (ZOFRAN) syringe/vial injection 4 mg, 4 mg, Intravenous, Once PRN, Huber Zee M.D.  •  haloperidol lactate (HALDOL) injection 1 mg, 1 mg, Intravenous, Q15 MIN PRN, Huber Zee M.D.  •  diphenhydrAMINE (BENADRYL) injection 12.5 mg, 12.5 mg, Intravenous, Q15 MIN PRN, Huber Zee M.D.  •  lactated ringers infusion, , Intravenous, Continuous, Huber Zee M.D.  •  [MAR Hold] senna-docusate (PERICOLACE or SENOKOT S) 8.6-50 MG per tablet 2 Tablet, 2 Tablet, Oral, BID, 2 Tablet at 02/02/22 0617 **AND** [MAR Hold] polyethylene glycol/lytes (MIRALAX) PACKET 1 Packet, 1 Packet, Oral, QDAY PRN **AND** [MAR Hold] magnesium hydroxide (MILK OF MAGNESIA) suspension 30 mL, 30 mL, Oral, QDAY PRN **AND** [MAR Hold] bisacodyl (DULCOLAX) suppository 10 mg, 10 mg, Rectal, QDAY PRN, Fredrick Xiao M.D.  •  NS infusion, , Intravenous, Continuous, Fredrick Xiao M.D., Last Rate: 75 mL/hr at 02/02/22 2107, New Bag at 02/02/22 2107  •  [MAR Hold] enoxaparin (LOVENOX) inj 40 mg, 40 mg, Subcutaneous, DAILY, Fredrick Xiao M.D., 40 mg at 02/03/22 0505  •  [MAR Hold] acetaminophen (Tylenol) tablet 650 mg, 650  mg, Oral, Q6HRS PRN, Fredrick Xiao M.D.  •  Notify provider if pain remains uncontrolled, , , CONTINUOUS **AND** Use the Numeric Rating Scale (NRS), Jimenez-Baker Faces (WBF), or FLACC on regular floors and Critical-Care Pain Observation Tool (CPOT) on ICUs/Trauma to assess pain, , , CONTINUOUS **AND** Pulse Ox, , , CONTINUOUS **AND** [MAR Hold] Pharmacy Consult Request ...Pain Management Review 1 Each, 1 Each, Other, PHARMACY TO DOSE **AND** If patient difficult to arouse and/or has respiratory depression (respiratory rate of 10 or less), stop any opiates that are currently infusing and call a Rapid Response., , , CONTINUOUS, Fredrick Xiao M.D.  •  [MAR Hold] labetalol (NORMODYNE/TRANDATE) injection 10 mg, 10 mg, Intravenous, Q4HRS PRN, Fredrick Xiao M.D.  •  [MAR Hold] ondansetron (ZOFRAN) syringe/vial injection 4 mg, 4 mg, Intravenous, Q4HRS PRN, Fredrick Xiao M.D., 4 mg at 02/02/22 1314  •  [MAR Hold] ondansetron (ZOFRAN ODT) dispertab 4 mg, 4 mg, Oral, Q4HRS PRN, Fredrick Xiao M.D., 4 mg at 02/03/22 0633  •  [MAR Hold] promethazine (PHENERGAN) tablet 12.5-25 mg, 12.5-25 mg, Oral, Q4HRS PRN, Fredrick Xiao M.D.  •  [MAR Hold] promethazine (PHENERGAN) suppository 12.5-25 mg, 12.5-25 mg, Rectal, Q4HRS PRN, Fredrick Xiao M.D.  •  [MAR Hold] prochlorperazine (COMPAZINE) injection 5-10 mg, 5-10 mg, Intravenous, Q4HRS PRN, Fredrick Xiao M.D.  •  [MAR Hold] albuterol inhaler 2 Puff, 2 Puff, Inhalation, Q4H PRN (RT), Fredrick Xiao M.D.  •  [MAR Hold] ambrisentan (LETAIRIS) TABS 10 mg, 10 mg, Oral, DAILY, Fredrick Xiao M.D., 10 mg at 02/03/22 0600  •  [MAR Hold] fludrocortisone (FLORINEF) tablet 0.1 mg, 0.1 mg, Oral, DAILY, Fredrick Xiao M.D., 0.1 mg at 02/03/22 0505  •  [MAR Hold] lacosamide (VIMPAT) tablet 100 mg, 100 mg, Oral, BID, Fredrick Xiao M.D., 100 mg at 02/03/22 0506  •  [MAR Hold] levETIRAcetam (KEPPRA) tablet 1,500 mg, 1,500 mg, Oral, BID, Fredrick Xiao M.D.,  1,500 mg at 02/03/22 0505  •  [MAR Hold] levothyroxine (SYNTHROID) tablet 137 mcg, 137 mcg, Oral, AM ES, Fredrick Xiao M.D., 137 mcg at 02/03/22 0505  •  [MAR Hold] mycophenolate (CELLCEPT) capsule 250 mg, 250 mg, Oral, BID, Fredrick Xiao M.D., 250 mg at 02/03/22 0506  •  [MAR Hold] omeprazole (PRILOSEC) capsule 40 mg, 40 mg, Oral, DAILY, Fredrick Xiao M.D., 40 mg at 02/03/22 0505  •  [MAR Hold] pravastatin (PRAVACHOL) tablet 20 mg, 20 mg, Oral, DAILY, Fredrick Xiao M.D., 20 mg at 02/03/22 0505  •  [MAR Hold] sertraline (Zoloft) tablet 100 mg, 100 mg, Oral, DAILY, Fredrick Xiao M.D., 100 mg at 02/03/22 0506  •  [MAR Hold] tacrolimus (PROGRAF) capsule 2 mg, 2 mg, Oral, BID, Fredrick Xiao M.D., 2 mg at 02/03/22 0505  •  [MAR Hold] tadalafil (CIALIS) TABS 20 mg, 20 mg, Oral, DAILY, Fredrick Xiao M.D., 20 mg at 02/03/22 0600  •  [MAR Hold] therapeutic multivitamin-minerals (THERAGRAN-M) tablet 1 Tablet, 1 Tablet, Oral, Q EVENING, Fredrick Xiao M.D., 1 Tablet at 02/02/22 1721  •  [MAR Hold] traZODone (DESYREL) tablet 50 mg, 50 mg, Oral, QHS, Fredrick Xiao M.D., 50 mg at 02/02/22 2104      Fluids    Intake/Output Summary (Last 24 hours) at 2/3/2022 1634  Last data filed at 2/3/2022 1555  Gross per 24 hour   Intake 250 ml   Output --   Net 250 ml       Laboratory  Recent Labs     02/01/22  0244 02/02/22  0340 02/03/22  0223   WBC 2.5* 1.8* 1.7*   RBC 2.99* 3.22* 3.00*   HEMOGLOBIN 7.7* 8.2* 8.0*   HEMATOCRIT 25.2* 27.6* 26.3*   MCV 84.3 85.7 87.7   MCH 25.8* 25.5* 26.7*   MCHC 30.6* 29.7* 30.4*   RDW 46.6 47.8 49.0   PLATELETCT 94* 87* 83*   MPV 10.6 9.6 9.6     Recent Labs     02/01/22  0244 02/02/22  0340 02/03/22 0220   SODIUM 141 143 143   POTASSIUM 3.4* 3.6 3.8   CHLORIDE 103 108 110   CO2 29 27 29   GLUCOSE 92 99 94   BUN 14 8 6*   CREATININE 0.52 0.52 0.53   CALCIUM 8.2* 8.4* 8.0*                   Imaging  MR-ABDOMEN-WITH & W/O   Final Result         1.  Ill-defined hypointense  lesion in the liver is likely related to prior surgery. There are surgical clips adjacent to the lesion. It is unchanged from prior exams dating back to 2018.      2.  Suggestion of portal hypertension with portal venous distention, small amount of ascites, and esophageal varices      3.  Multiple splenic cysts as seen on prior exams.      4.  Colonic wall thickening in the partially visualized bowel suggests colitis.                           CT-ABDOMEN-PELVIS WITH   Final Result         1.  Fluid-filled reactive small bowel loops in left midabdomen suggests ileus and/or enteritis, recommend radiographic follow-up to resolution to exclude progression to obstructive changes as clinically appropriate.   2.  Low-density lesion along the inferior margin of the liver, indeterminate and new since prior studies in 2018, recommend follow-up 3 phase CT liver or MRI of the liver with contrast for further characterization.   3.  Small hiatal hernia   4.  Low-density splenic lesions, appearance favors splenic cyst.   5.  Atherosclerosis           Assessment/Plan  * Colitis- (present on admission)  Assessment & Plan  Was hospitalized recently for worsening abdominal pain and was found to have colitis.    Given her history of C. difficile, will hold off on antibiotics for now.  Supportive management.  If patient develops diarrhea, will check C. Difficile toxin PCR.    Ileus (HCC)  Assessment & Plan  May be related to opiates use.  Enteritis is on the differential.  Limit opiates  IV Zofran as needed  Clear liquid diet to Full liquid diet on 2/1.  CTM.      Liver lesion  Assessment & Plan  New since 2018 hypodense liver lesion along the inferior margin of the liver noted on CT of the abdomen and pelvis  That may be liver abscess.  Ordered MRI with contrast for further evaluation  Consider empiric antibiotics if patient spikes fever.  2/2: MRI of the abdomen showed ill-defined hypointense lesion in the liver likely related to  prior surgery.  There was suggestion of portal hypertension with portal venous distention and esophageal varices.  There was mild colonic wall thickening suggesting colitis.  Patient has history of C. difficile in 2016 but had no diarrhea and no bowel movement since admission.  Patient describes her pain as periumbilical that is worsened with p.o. intake followed by nausea and vomiting.  No specific upper GI symptoms.  Discussed the case with GI Dr. Richards who will evaluate the patient and possibly perform EGD tomorrow morning.  Given her chronic use of narcotics and having abdominal pain for 2 months, narcotic bowels syndrome on the differential.  We will try 1 dose of Relistor and assess response.      Moderate protein-calorie malnutrition (HCC)- (present on admission)  Assessment & Plan  No dietary restrictions.  Supplements  Dietitian consult      Seizures (HCC)- (present on admission)  Assessment & Plan  History of  Continue Keppra and Vimpat    Abdominal pain  Assessment & Plan  Patient has been having constant periumbilical and left quadrant abdominal pain started 2 months ago.  Imaging studies results as above.  GI consulted  Possible EGD in the morning.  Keep n.p.o. at midnight.  2/3: Planning for EGD this morning.    Primary pulmonary hypertension (HCC)- (present on admission)  Assessment & Plan  Continue ambrisentan, Cialis, supplemental oxygen on 3 to 5 L.      History of Clostridium difficile infection- (present on admission)  Assessment & Plan  Monitor for diarrhea, consider stool PCR if diarrhea reoccurs.  2/2: No bowel movement since last night.  No diarrhea reported.    Status post liver transplant Dr. [place] ([facility])- (present on admission)  Assessment & Plan  Follow-up with transplant as outpatient  Continue tacrolimus, CellCept and prednisone.  Check tacrolimus levels in the morning prior to next dose.  2/2: Tacrolimus levels within acceptable levels.       VTE prophylaxis: enoxaparin  ppx    I have performed a physical exam and reviewed and updated ROS and Plan today (2/3/2022). In review of yesterday's note (2/2/2022), there are no changes except as documented above.

## 2022-02-04 NOTE — PROGRESS NOTES
Gastroenterology Progress Note     Author: Tami Cheek M.D.   Date & Time Created: 2/4/2022 8:49 AM    Chief Complaint:  Nausea, vomiting, diarrhea, abdominal pain    ID:  61-year-old female with an extensive past medical history significant for OLT (2011), history of multiple bariatric surgeries, pulmonary hypertension, COPD, stage III CKD, MGUS, pancytopenia.  GI has been consulted for the patient's intractable vomiting, diarrhea, and nausea.  She is status post EGD 2/3/2022 which was normal.    Interval History:  This AM, patient states she feels stressed and has persistent nausea and vomiting.  She has been having diarrhea which is green in color.  States that she also feels some shakiness in her arms and hands bilaterally.    Review of Systems:  Review of Systems   Constitutional: Positive for malaise/fatigue. Negative for chills and fever.   HENT: Negative for congestion and sore throat.    Eyes: Negative for blurred vision and double vision.   Respiratory: Negative for cough, shortness of breath and wheezing.    Cardiovascular: Negative for chest pain, palpitations and leg swelling.   Gastrointestinal: Positive for abdominal pain, diarrhea, heartburn, nausea and vomiting. Negative for blood in stool and constipation.   Genitourinary: Negative for dysuria, frequency and urgency.   Musculoskeletal: Negative for falls and myalgias.   Neurological: Positive for tremors and weakness. Negative for dizziness, speech change, seizures and headaches.   Psychiatric/Behavioral: Positive for memory loss. Negative for substance abuse and suicidal ideas. The patient is not nervous/anxious.          Current Facility-Administered Medications:   •  calcium carbonate (TUMS) chewable tab 500 mg, 500 mg, Oral, TID PRN, HONORIO ReynaP.RRealNReal, 500 mg at 02/04/22 0300  •  tadalafil (CIALIS) TABS 20 mg, 20 mg, Oral, DAILY, Fredrick Xiao M.D., 20 mg at 02/04/22 0600  •  magnesium oxide (MAG-OX) tablet 400 mg, 400 mg,  Oral, DAILY, Kimberly Burr M.D., 400 mg at 02/04/22 0508  •  [DISCONTINUED] oxyCODONE immediate-release (ROXICODONE) tablet 2.5 mg, 2.5 mg, Oral, Q3HRS PRN **OR** [DISCONTINUED] oxyCODONE immediate-release (ROXICODONE) tablet 5 mg, 5 mg, Oral, Q3HRS PRN, 5 mg at 02/02/22 1026 **OR** HYDROmorphone (Dilaudid) injection 0.25 mg, 0.25 mg, Intravenous, Q4HRS PRN, Kimberly Burr M.D., 0.25 mg at 02/04/22 0828  •  gabapentin (NEURONTIN) capsule 100 mg, 100 mg, Oral, TID, Kimberly Burr M.D., 100 mg at 02/04/22 0509  •  oxyCODONE immediate-release (ROXICODONE) tablet 5 mg, 5 mg, Oral, Q4HRS PRN, 5 mg at 02/04/22 0716 **OR** oxyCODONE CR (OXYCONTIN) tablet 10 mg, 10 mg, Oral, Q4HRS PRN, Kimberly Burr M.D.  •  senna-docusate (PERICOLACE or SENOKOT S) 8.6-50 MG per tablet 2 Tablet, 2 Tablet, Oral, BID, 2 Tablet at 02/04/22 0508 **AND** polyethylene glycol/lytes (MIRALAX) PACKET 1 Packet, 1 Packet, Oral, QDAY PRN **AND** magnesium hydroxide (MILK OF MAGNESIA) suspension 30 mL, 30 mL, Oral, QDAY PRN **AND** bisacodyl (DULCOLAX) suppository 10 mg, 10 mg, Rectal, QDAY PRN, Fredrick Xiao M.D.  •  NS infusion, , Intravenous, Continuous, Fredrick Xiao M.D., Last Rate: 75 mL/hr at 02/02/22 2107, New Bag at 02/02/22 2107  •  enoxaparin (LOVENOX) inj 40 mg, 40 mg, Subcutaneous, DAILY, Fredrick Xiao M.D., 40 mg at 02/04/22 0509  •  acetaminophen (Tylenol) tablet 650 mg, 650 mg, Oral, Q6HRS PRN, Fredrick Xiao M.D.  •  Notify provider if pain remains uncontrolled, , , CONTINUOUS **AND** Use the Numeric Rating Scale (NRS), Jimenez-Baker Faces (WBF), or FLACC on regular floors and Critical-Care Pain Observation Tool (CPOT) on ICUs/Trauma to assess pain, , , CONTINUOUS **AND** Pulse Ox, , , CONTINUOUS **AND** Pharmacy Consult Request ...Pain Management Review 1 Each, 1 Each, Other, PHARMACY TO DOSE **AND** If patient difficult to arouse and/or has respiratory depression (respiratory rate of 10 or less), stop any  opiates that are currently infusing and call a Rapid Response., , , CONTINUOUS, Fredrick Xiao M.D.  •  labetalol (NORMODYNE/TRANDATE) injection 10 mg, 10 mg, Intravenous, Q4HRS PRN, Fredrick Xiao M.D.  •  ondansetron (ZOFRAN) syringe/vial injection 4 mg, 4 mg, Intravenous, Q4HRS PRN, Fredrick Xiao M.D., 4 mg at 02/04/22 0828  •  ondansetron (ZOFRAN ODT) dispertab 4 mg, 4 mg, Oral, Q4HRS PRN, Fredrick Xiao M.D., 4 mg at 02/03/22 0633  •  promethazine (PHENERGAN) tablet 12.5-25 mg, 12.5-25 mg, Oral, Q4HRS PRN, Fredrick Xiao M.D.  •  promethazine (PHENERGAN) suppository 12.5-25 mg, 12.5-25 mg, Rectal, Q4HRS PRN, Fredrick Xiao M.D.  •  prochlorperazine (COMPAZINE) injection 5-10 mg, 5-10 mg, Intravenous, Q4HRS PRN, Fredrick Xiao M.D.  •  albuterol inhaler 2 Puff, 2 Puff, Inhalation, Q4H PRN (RT), Fredrick Xiao M.D.  •  ambrisentan (LETAIRIS) TABS 10 mg, 10 mg, Oral, DAILY, Fredrick Xiao M.D., 10 mg at 02/04/22 0600  •  fludrocortisone (FLORINEF) tablet 0.1 mg, 0.1 mg, Oral, DAILY, Fredrick Xiao M.D., 0.1 mg at 02/04/22 0509  •  lacosamide (VIMPAT) tablet 100 mg, 100 mg, Oral, BID, Fredrick Xiao M.D., 100 mg at 02/04/22 0509  •  levETIRAcetam (KEPPRA) tablet 1,500 mg, 1,500 mg, Oral, BID, Fredrick Xiao M.D., 1,500 mg at 02/04/22 0509  •  levothyroxine (SYNTHROID) tablet 137 mcg, 137 mcg, Oral, AM ES, Fredrick Xiao M.D., 137 mcg at 02/04/22 0509  •  mycophenolate (CELLCEPT) capsule 250 mg, 250 mg, Oral, BID, Fredrick Xiao M.D., 250 mg at 02/04/22 0508  •  omeprazole (PRILOSEC) capsule 40 mg, 40 mg, Oral, DAILY, Fredrick Xiao M.D., 40 mg at 02/04/22 0509  •  pravastatin (PRAVACHOL) tablet 20 mg, 20 mg, Oral, DAILY, Fredrick Xiao M.D., 20 mg at 02/04/22 0509  •  sertraline (Zoloft) tablet 100 mg, 100 mg, Oral, DAILY, Fredrick Xiao M.D., 100 mg at 02/03/22 2104  •  tacrolimus (PROGRAF) capsule 2 mg, 2 mg, Oral, BID, Fredrick Xiao M.D., 2 mg at 02/04/22  0509  •  therapeutic multivitamin-minerals (THERAGRAN-M) tablet 1 Tablet, 1 Tablet, Oral, Q EVENING, Fredrick Xiao M.D., 1 Tablet at 02/03/22 1718  •  traZODone (DESYREL) tablet 50 mg, 50 mg, Oral, QHS, Fredrick Xiao M.D., 50 mg at 02/03/22 2104     Physical Exam:  Vitals:    02/04/22 0828   BP:    Pulse:    Resp: 18   Temp:    SpO2:         Physical Exam  Vitals and nursing note reviewed.   Constitutional:       General: She is in acute distress.      Appearance: Normal appearance. She is normal weight. She is ill-appearing. She is not toxic-appearing or diaphoretic.   HENT:      Head: Normocephalic and atraumatic.      Right Ear: External ear normal.      Left Ear: External ear normal.      Nose: Nose normal. No congestion or rhinorrhea.      Mouth/Throat:      Mouth: Mucous membranes are dry.      Pharynx: No oropharyngeal exudate or posterior oropharyngeal erythema.   Eyes:      General:         Right eye: No discharge.         Left eye: No discharge.      Pupils: Pupils are equal, round, and reactive to light.   Cardiovascular:      Rate and Rhythm: Normal rate and regular rhythm.      Pulses: Normal pulses.      Heart sounds: No murmur heard.      Pulmonary:      Effort: Pulmonary effort is normal. No respiratory distress.      Breath sounds: No wheezing or rales.   Abdominal:      General: Abdomen is flat. Bowel sounds are normal. There is no distension.      Palpations: Abdomen is soft.      Tenderness: There is abdominal tenderness. There is no right CVA tenderness, left CVA tenderness or guarding.      Comments: Diffuse tenderness to palpation in all quadrants   Musculoskeletal:         General: No swelling or tenderness. Normal range of motion.      Cervical back: Normal range of motion and neck supple. No rigidity.      Right lower leg: No edema.      Left lower leg: No edema.   Skin:     General: Skin is warm and dry.      Capillary Refill: Capillary refill takes less than 2 seconds.       Coloration: Skin is pale.   Neurological:      General: No focal deficit present.      Mental Status: She is alert and oriented to person, place, and time.      Cranial Nerves: No cranial nerve deficit.      Sensory: No sensory deficit.      Motor: No weakness.      Comments: +tremor in bilateral hands   Psychiatric:         Mood and Affect: Mood is anxious.         Behavior: Behavior is agitated.         Thought Content: Thought content normal.         Judgment: Judgment normal.         Labs:          Recent Labs     02/02/22 0340 02/03/22 0220   SODIUM 143 143   POTASSIUM 3.6 3.8   CHLORIDE 108 110   CO2 27 29   BUN 8 6*   CREATININE 0.52 0.53   MAGNESIUM 1.6  --    PHOSPHORUS 4.2  --    CALCIUM 8.4* 8.0*     Recent Labs     02/02/22 0340 02/03/22 0220   ALTSGPT 20 19   ASTSGOT 26 29   ALKPHOSPHAT 46 42   TBILIRUBIN 0.2 <0.2   GLUCOSE 99 94     Recent Labs     02/02/22 0340 02/03/22 0223   RBC 3.22* 3.00*   HEMOGLOBIN 8.2* 8.0*   HEMATOCRIT 27.6* 26.3*   PLATELETCT 87* 83*     Recent Labs     02/02/22 0340 02/03/22 0220 02/03/22 0223   WBC 1.8*  --  1.7*   NEUTSPOLYS 49.10  --  31.10*   LYMPHOCYTES 33.40  --  48.50*   MONOCYTES 7.00  --  12.00   EOSINOPHILS 10.50*  --  7.20*   BASOPHILS 0.00  --  0.60   ASTSGOT 26 29  --    ALTSGPT 20 19  --    ALKPHOSPHAT 46 42  --    TBILIRUBIN 0.2 <0.2  --        Imaging:  MR-ABDOMEN-WITH & W/O   Final Result         1.  Ill-defined hypointense lesion in the liver is likely related to prior surgery. There are surgical clips adjacent to the lesion. It is unchanged from prior exams dating back to 2018.      2.  Suggestion of portal hypertension with portal venous distention, small amount of ascites, and esophageal varices      3.  Multiple splenic cysts as seen on prior exams.      4.  Colonic wall thickening in the partially visualized bowel suggests colitis.                           CT-ABDOMEN-PELVIS WITH   Final Result         1.  Fluid-filled reactive small bowel  loops in left midabdomen suggests ileus and/or enteritis, recommend radiographic follow-up to resolution to exclude progression to obstructive changes as clinically appropriate.   2.  Low-density lesion along the inferior margin of the liver, indeterminate and new since prior studies in 2018, recommend follow-up 3 phase CT liver or MRI of the liver with contrast for further characterization.   3.  Small hiatal hernia   4.  Low-density splenic lesions, appearance favors splenic cyst.   5.  Atherosclerosis           Assessment:  61-year-old female with an extensive past medical history significant for OLT (2011), history of multiple bariatric surgeries, pulmonary hypertension, COPD, stage III CKD, MGUS, pancytopenia.    GI has been consulted for the patient's intractable vomiting, diarrhea, and nausea.  She is status post EGD 2/3/2022 which did not show any acute pathology.    There is also concerned that the patient's tacrolimus level is less than 2, which is likely subtherapeutic given the patient's OLT in 2011.      Plan:  -Recommend gastric emptying study to rule out gastroparesis  -Consider change mycophenolate to IV + tacrolimus to sublingual to improve absorption given nausea/vomiting  -PT/OT  -Monitor and replete electrolytes, goal of magnesium greater than 2, phosphorus greater than 3, potassium greater than 4  -Antiemetics as needed     Thank you for the consultation. Discussed with Dr. Burr. Please call with any questions or concerns.     Tami Cheek M.D.  Internal Medicine Resident  [facility]

## 2022-02-04 NOTE — DISCHARGE PLANNING
Received Choice form at 1024 2/2  Agency/Facility Name: [facility]  Referral sent per Choice form @ 2140

## 2022-02-04 NOTE — THERAPY
Missed Therapy     Patient Name: Roxana Cuba  Age:  61 y.o., Sex:  female  Medical Record #: [id number]  Today's Date: 2/4/2022    Spoke with patient at bedside about therapy evaluation, pt states able to get up without assistance complete all ADLs, feels she is at baseline and ready to discharge home with . OT order will completed, alerted RN.

## 2022-02-04 NOTE — PROGRESS NOTES
Assumed care of patient this shift. Patient is alert and oriented x 4.  Able to make her needs known. Complained of severe 10/10 pain, medicated with PRN pain medication per MAR. Fall prevention tactics in place, bed locked and in lowest position and non-skid footwear in use. Patient continues to refuse bed alarm despite education.

## 2022-02-05 NOTE — PROGRESS NOTES
Assumed care of pt at shift change. Pt is on 4.5L of oxygen via NC with no signs of acute distress. A&Ox4. Medicated with Toradol 15mg for 8/10 pain to abdomen and 10/10  Pain to her head. Pt states thatt her head ache started in August but she feels it is getting worse. Running NS at 75 mL/hr.POC discussed with patient. All safety measures in place. Call light and personal belongings by bedside. Bed locked and in lowest position. Hourly rounding in place.

## 2022-02-05 NOTE — PROGRESS NOTES
Hospital Medicine Daily Progress Note    Date of Service  2/4/2022    Chief Complaint  Roxana Cuba is a 61 y.o. 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  Patient stated that she has been having constant periumbilical pain that is usually aggravated with p.o. intake.  This associated with nausea and vomiting 30 minutes after any meal.  She admits to losing almost 30 pounds over the last 2-months.  She has been trying to make an appointment with outpatient GI but was unable to get any sooner appointment so she presented to ER for further evaluation.  In ER noted to be hemodynamically stable.  Afebrile.  Respiratory status at baseline with oxygen demands.  Lab work was significant for pancytopenia.  UA was negative for UTI.  Lactic acid was within normal limits.  Lipase was within normal limits.  CT scan of the abdomen pelvis suggested ileus versus enteritis.  There was low-density lesion along the inferior margin of the liver measured 1.6 cm that is new since prior study in 2018.  MRI of the abdomen was done which showed ill-defined hypointense lesion in the liver likely related to prior surgery with colonic wall thickening in the partially visualized bowel suggesting colitis.  Findings discussed with GI.  Considering her provoked  abdominal pain associate with nausea and vomiting, GI suggested upper endoscopy.  Interval Problem Update  2/2: Recently a couple.  No bowel movement since last night.  Afebrile.  Tolerating liquid diet.  Still having abdominal pain.  MRI findings discussed with patient.  GI consulted.  Planning for possible EGD in the a.m.  We will keep n.p.o. at midnight.  2/3: Resting in bed comfortably.  Still complaining of abdominal pain.  Denies any nausea vomiting this morning.  Afebrile peer hemodynamically stable.  Has been n.p.o. since midnight for EGD this morning.  2/4: Patient stated that she is still nauseous and still having abdominal pain.  EGD was done yesterday which was unremarkable.  GI recommending gastric emptying study to lower rule out gastroparesis.  Hemodynamically stable.  Afebrile.  Tolerating renal diet.  Feels nauseous but no vomiting this morning.  No issues overnight per staff.    I have personally seen and examined the patient at bedside. I discussed the plan of care with patient.    Consultants/Specialty  GI Dr Richards    Code Status  Full Code    Disposition  Pending at this time  Review of Systems   Constitutional: Positive for malaise/fatigue and weight loss. Negative for fever.   HENT: Negative for hearing loss.    Eyes: Negative for blurred vision and double vision.   Respiratory: Negative for cough.    Cardiovascular: Negative for chest pain.   Gastrointestinal: Positive for abdominal pain, nausea and vomiting. Negative for blood in stool.   Genitourinary: Negative for dysuria.   Musculoskeletal: Negative for myalgias.   Skin: Negative for rash.   Neurological: Negative for dizziness.   Psychiatric/Behavioral: Negative for depression.       Physical Exam  Temp:  [36.3 °C (97.3 °F)-37.1 °C (98.8 °F)] 36.4 °C (97.5 °F)  Pulse:  [63-89] 63  Resp:  [16-18] 17  BP: (108-147)/(47-79) 115/57  SpO2:  [92 %-100 %] 99 %    General: No acute distress  HEENT atraumatic, normocephalic, pupils equal round  reactive to light  Neck: No JVD  Chest: Respirations are unlabored  Cardiac: Physiologic S1 and S2  Abdomen: Soft, nontender, nondistended  Extremities: Without clubbing, cyanosis or edema  Neuro: Cranial nerves II through XII are grossly intact.  Psych: No anxiety, judgement intact.          Current Facility-Administered Medications:   •  calcium carbonate (TUMS) chewable tab 500 mg, 500 mg, Oral, TID PRN, BEHZAD Reyna, 500 mg at 02/04/22 0300  •  tadalafil (CIALIS) TABS 20 mg, 20 mg, Oral, DAILY, Fredrick Xiao M.D., 20 mg at 02/04/22 0600  •  tacrolimus (PROGRAF) 0.5 mg/mL oral suspension 2 mg, 2 mg, Oral, Q12HRS, Tami Cheek M.D.  •  mycophenolate (CELLCEPT) capsule 250 mg, 250 mg, Oral, BID, Tami Cheek M.D.  •  potassium phosphate IVPB 30 mmol in 500 mL D5W (premix), 30 mmol, Intravenous, Once, Tami Cheek M.D.  •  magnesium sulfate IVPB premix 4 g, 4 g, Intravenous, Once, Tami Cheek M.D., Last Rate: 25 mL/hr at 02/04/22 1540, 4 g at 02/04/22 1540  •  magnesium oxide (MAG-OX) tablet 400 mg, 400 mg, Oral, DAILY, Kimberly Burr M.D., 400 mg at 02/04/22 0508  •  [DISCONTINUED] oxyCODONE immediate-release (ROXICODONE) tablet 2.5 mg, 2.5 mg, Oral, Q3HRS PRN **OR** [DISCONTINUED] oxyCODONE immediate-release (ROXICODONE) tablet 5 mg, 5 mg, Oral, Q3HRS PRN, 5 mg at 02/02/22 1026 **OR** HYDROmorphone (Dilaudid) injection 0.25 mg, 0.25 mg, Intravenous, Q4HRS PRN, Kimberly Burr M.D., 0.25 mg at 02/04/22 0828  •  gabapentin (NEURONTIN) capsule 100 mg, 100 mg, Oral, TID, Kimberly Burr M.D., 100 mg at 02/04/22 1246  •  oxyCODONE immediate-release (ROXICODONE) tablet 5 mg, 5 mg, Oral, Q4HRS PRN, 5 mg at 02/04/22 0716 **OR** oxyCODONE CR (OXYCONTIN) tablet 10 mg, 10 mg, Oral, Q4HRS PRN, Kimberly Burr M.D., 10 mg at 02/04/22 1145  •  senna-docusate (PERICOLACE or SENOKOT S) 8.6-50 MG per tablet 2 Tablet, 2 Tablet, Oral, BID, 2 Tablet at 02/04/22 0508 **AND** polyethylene  glycol/lytes (MIRALAX) PACKET 1 Packet, 1 Packet, Oral, QDAY PRN **AND** magnesium hydroxide (MILK OF MAGNESIA) suspension 30 mL, 30 mL, Oral, QDAY PRN **AND** bisacodyl (DULCOLAX) suppository 10 mg, 10 mg, Rectal, QDAY PRN, Fredrick Xiao M.D.  •  NS infusion, , Intravenous, Continuous, Fredrick Xiao M.D., Last Rate: 75 mL/hr at 02/02/22 2107, New Bag at 02/02/22 2107  •  enoxaparin (LOVENOX) inj 40 mg, 40 mg, Subcutaneous, DAILY, Fredrick Xiao M.D., 40 mg at 02/04/22 0509  •  acetaminophen (Tylenol) tablet 650 mg, 650 mg, Oral, Q6HRS PRN, Fredrick Xiao M.D.  •  Notify provider if pain remains uncontrolled, , , CONTINUOUS **AND** Use the Numeric Rating Scale (NRS), Jimenez-Baker Faces (WBF), or FLACC on regular floors and Critical-Care Pain Observation Tool (CPOT) on ICUs/Trauma to assess pain, , , CONTINUOUS **AND** Pulse Ox, , , CONTINUOUS **AND** Pharmacy Consult Request ...Pain Management Review 1 Each, 1 Each, Other, PHARMACY TO DOSE **AND** If patient difficult to arouse and/or has respiratory depression (respiratory rate of 10 or less), stop any opiates that are currently infusing and call a Rapid Response., , , CONTINUOUS, Fredrick Xiao M.D.  •  labetalol (NORMODYNE/TRANDATE) injection 10 mg, 10 mg, Intravenous, Q4HRS PRN, Fredrick Xiao M.D.  •  ondansetron (ZOFRAN) syringe/vial injection 4 mg, 4 mg, Intravenous, Q4HRS PRN, Fredrick Xiao M.D., 4 mg at 02/04/22 0828  •  ondansetron (ZOFRAN ODT) dispertab 4 mg, 4 mg, Oral, Q4HRS PRN, Fredrick Xiao M.D., 4 mg at 02/03/22 0633  •  promethazine (PHENERGAN) tablet 12.5-25 mg, 12.5-25 mg, Oral, Q4HRS PRN, Fredrick Xiao M.D.  •  promethazine (PHENERGAN) suppository 12.5-25 mg, 12.5-25 mg, Rectal, Q4HRS PRN, Fredrick Xiao M.D.  •  prochlorperazine (COMPAZINE) injection 5-10 mg, 5-10 mg, Intravenous, Q4HRS PRN, Fredrick Xiao M.D.  •  albuterol inhaler 2 Puff, 2 Puff, Inhalation, Q4H PRN (RT), Fredrick Xiao M.D.  •   ambrisentan (LETAIRIS) TABS 10 mg, 10 mg, Oral, DAILY, Fredrick Xiao M.D., 10 mg at 02/04/22 0600  •  fludrocortisone (FLORINEF) tablet 0.1 mg, 0.1 mg, Oral, DAILY, Fredrick Xiao M.D., 0.1 mg at 02/04/22 0509  •  lacosamide (VIMPAT) tablet 100 mg, 100 mg, Oral, BID, Fredrick Xiao M.D., 100 mg at 02/04/22 0509  •  levETIRAcetam (KEPPRA) tablet 1,500 mg, 1,500 mg, Oral, BID, Fredrick Xiao M.D., 1,500 mg at 02/04/22 0509  •  levothyroxine (SYNTHROID) tablet 137 mcg, 137 mcg, Oral, AM ES, Fredrick Xiao M.D., 137 mcg at 02/04/22 0509  •  omeprazole (PRILOSEC) capsule 40 mg, 40 mg, Oral, DAILY, Fredrick Xiao M.D., 40 mg at 02/04/22 0509  •  pravastatin (PRAVACHOL) tablet 20 mg, 20 mg, Oral, DAILY, Fredrick Xiao M.D., 20 mg at 02/04/22 0509  •  sertraline (Zoloft) tablet 100 mg, 100 mg, Oral, DAILY, Fredrick Xiao M.D., 100 mg at 02/03/22 2104  •  therapeutic multivitamin-minerals (THERAGRAN-M) tablet 1 Tablet, 1 Tablet, Oral, Q EVENING, Fredrick Xiao M.D., 1 Tablet at 02/03/22 1718  •  traZODone (DESYREL) tablet 50 mg, 50 mg, Oral, QHS, Fredrick Xiao M.D., 50 mg at 02/03/22 2104      Fluids    Intake/Output Summary (Last 24 hours) at 2/4/2022 1638  Last data filed at 2/4/2022 0900  Gross per 24 hour   Intake 3788 ml   Output --   Net 3788 ml       Laboratory  Recent Labs     02/02/22  0340 02/03/22  0223   WBC 1.8* 1.7*   RBC 3.22* 3.00*   HEMOGLOBIN 8.2* 8.0*   HEMATOCRIT 27.6* 26.3*   MCV 85.7 87.7   MCH 25.5* 26.7*   MCHC 29.7* 30.4*   RDW 47.8 49.0   PLATELETCT 87* 83*   MPV 9.6 9.6     Recent Labs     02/02/22  0340 02/03/22 0220 02/04/22  1116   SODIUM 143 143 142   POTASSIUM 3.6 3.8 3.6   CHLORIDE 108 110 110   CO2 27 29 25   GLUCOSE 99 94 164*   BUN 8 6* 4*   CREATININE 0.52 0.53 0.63   CALCIUM 8.4* 8.0* 8.5                   Imaging  MR-ABDOMEN-WITH & W/O   Final Result         1.  Ill-defined hypointense lesion in the liver is likely related to prior surgery. There are  surgical clips adjacent to the lesion. It is unchanged from prior exams dating back to 2018.      2.  Suggestion of portal hypertension with portal venous distention, small amount of ascites, and esophageal varices      3.  Multiple splenic cysts as seen on prior exams.      4.  Colonic wall thickening in the partially visualized bowel suggests colitis.                           CT-ABDOMEN-PELVIS WITH   Final Result         1.  Fluid-filled reactive small bowel loops in left midabdomen suggests ileus and/or enteritis, recommend radiographic follow-up to resolution to exclude progression to obstructive changes as clinically appropriate.   2.  Low-density lesion along the inferior margin of the liver, indeterminate and new since prior studies in 2018, recommend follow-up 3 phase CT liver or MRI of the liver with contrast for further characterization.   3.  Small hiatal hernia   4.  Low-density splenic lesions, appearance favors splenic cyst.   5.  Atherosclerosis      NM-GASTRIC EMPTYING    (Results Pending)        Assessment/Plan  Moderate protein-calorie malnutrition (HCC)- (present on admission)  Assessment & Plan  No dietary restrictions.  Supplements  Dietitian consult      Seizures (HCC)- (present on admission)  Assessment & Plan  History of  Continue Keppra and Vimpat    Abdominal pain  Assessment & Plan  Patient has been having constant periumbilical and left quadrant abdominal pain started 2 months ago.  Imaging studies results as above.  GI consulted  Possible EGD in the morning.  Keep n.p.o. at midnight.  2/3: Planning for EGD this morning.  2/4: EGD was unremarkable.  GI recommending gastric emptying study to rule out gastroparesis.    Primary pulmonary hypertension (HCC)- (present on admission)  Assessment & Plan  Continue ambrisentan, Cialis, supplemental oxygen on 3 to 5 L.      History of Clostridium difficile infection- (present on admission)  Assessment & Plan  Monitor for diarrhea, consider stool  PCR if diarrhea reoccurs.  2/2: No bowel movement since last night.  No diarrhea reported.    Status post liver transplant Dr. [place] ([facility])- (present on admission)  Assessment & Plan  Follow-up with transplant as outpatient  Continue tacrolimus, CellCept and prednisone.  Check tacrolimus levels in the morning prior to next dose.  2/2: Tacrolimus levels within acceptable levels.       VTE prophylaxis: enoxaparin ppx    I have performed a physical exam and reviewed and updated ROS and Plan today (2/4/2022). In review of yesterday's note (2/3/2022), there are no changes except as documented above.

## 2022-02-05 NOTE — CARE PLAN
The patient is Stable - Low risk of patient condition declining or worsening    Shift Goals  Clinical Goals: Patient's pain will be controlled       Progress made toward(s) clinical / shift goals:    Patient complained of severe pain this shift. Medicated with PRN pain medications per MAR.     Problem: Pain - Standard  Goal: Alleviation of pain or a reduction in pain to the patient’s comfort goal  Outcome: Progressing     Problem: Knowledge Deficit - Standard  Goal: Patient and family/care givers will demonstrate understanding of plan of care, disease process/condition, diagnostic tests and medications  Outcome: Progressing     Problem: Fall Risk  Goal: Patient will remain free from falls  Outcome: Progressing

## 2022-02-05 NOTE — PROGRESS NOTES
Spoke to nuc med tech Kostas pt. Needs to be NPO at midnight, no pain med iv or oral, no anti nausea med. No PPI. Pt. Updated of the preparation. Per nhi pt. May do toradol if pain is not controlled since narcotic pain meds are to be held.

## 2022-02-05 NOTE — CARE PLAN
The patient is Stable - Low risk of patient condition declining or worsening    Shift Goals  Clinical Goals: pt. pain will be under controlled      Progress made toward(s) clinical / shift goals:  Pt. PRN pain med given, will be NPO at mn and no pain med for gastric emptying study. Pt. With 1 x dose of Toradol. Pt. Given PRN's until NPO.      Problem: Pain - Standard  Goal: Alleviation of pain or a reduction in pain to the patient’s comfort goal  Outcome: Progressing     Problem: Knowledge Deficit - Standard  Goal: Patient and family/care givers will demonstrate understanding of plan of care, disease process/condition, diagnostic tests and medications  Outcome: Progressing     Problem: Fall Risk  Goal: Patient will remain free from falls  Outcome: Progressing

## 2022-02-05 NOTE — ASSESSMENT & PLAN NOTE
Complaint of worsening headache.  Given her history of prior intracranial bleed we will check head CT.

## 2022-02-05 NOTE — PROGRESS NOTES
Hospital Medicine Daily Progress Note    Date of Service  2/5/2022    Chief Complaint  Roxana Cuba is a 61 y.o. 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  Patient stated that she has been having constant periumbilical pain that is usually aggravated with p.o. intake.  This associated with nausea and vomiting 30 minutes after any meal.  She admits to losing almost 30 pounds over the last 2-months.  She has been trying to make an appointment with outpatient GI but was unable to get any sooner appointment so she presented to ER for further evaluation.  In ER noted to be hemodynamically stable.  Afebrile.  Respiratory status at baseline with oxygen demands.  Lab work was significant for pancytopenia.  UA was negative for UTI.  Lactic acid was within normal limits.  Lipase was within normal limits.  CT scan of the abdomen pelvis suggested ileus versus enteritis.  There was low-density lesion along the inferior margin of the liver measured 1.6 cm that is new since prior study in 2018.  MRI of the abdomen was done which showed ill-defined hypointense lesion in the liver likely related to prior surgery with colonic wall thickening in the partially visualized bowel suggesting colitis.  Findings discussed with GI.  Considering her provoked  abdominal pain associate with nausea and vomiting, GI suggested upper endoscopy.  Interval Problem Update  2/2: Recently a couple.  No bowel movement since last night.  Afebrile.  Tolerating liquid diet.  Still having abdominal pain.  MRI findings discussed with patient.  GI consulted.  Planning for possible EGD in the a.m.  We will keep n.p.o. at midnight.  2/3: Resting in bed comfortably.  Still complaining of abdominal pain.  Denies any nausea vomiting this morning.  Afebrile peer hemodynamically stable.  Has been n.p.o. since midnight for EGD this morning.  2/4: Patient stated that she is still nauseous and still having abdominal pain.  EGD was done yesterday which was unremarkable.  GI recommending gastric emptying study to lower rule out gastroparesis.  Hemodynamically stable.  Afebrile.  Tolerating renal diet.  Feels nauseous but no vomiting this morning.  No issues overnight per staff.  2/5: Resting in bed comfortably.  I have increased headache this morning.  CT ordered.  Going for gastric emptying study.  Tolerating p.o.  No acute distress noted.  No issues overnight per staff.    I have personally seen and examined the patient at bedside. I discussed the plan of care with patient.    Consultants/Specialty  GI Dr Richards    Code Status  Full Code    Disposition  Pending at this time  Review of Systems   Constitutional: Positive for malaise/fatigue and weight loss. Negative for fever.   HENT: Negative for hearing loss.    Eyes: Negative for blurred vision and double vision.   Respiratory: Negative.    Cardiovascular: Negative for chest pain.   Gastrointestinal: Positive for abdominal pain, nausea and vomiting. Negative for blood in stool.   Genitourinary: Negative for dysuria.   Musculoskeletal: Negative for myalgias.   Skin: Negative for rash.   Neurological: Positive for headaches. Negative for dizziness.   Psychiatric/Behavioral: Negative for depression.       Physical Exam  Temp:  [36.3 °C (97.3 °F)-37  °C (98.6 °F)] 36.3 °C (97.4 °F)  Pulse:  [63-87] 74  Resp:  [16-20] 16  BP: (115-155)/(57-89) 155/80  SpO2:  [94 %-100 %] 100 %    General: No acute distress  HEENT atraumatic, normocephalic, pupils equal round reactive to light  Neck: No JVD  Chest: Respirations are unlabored  Cardiac: Physiologic S1 and S2  Abdomen: Soft, nontender, nondistended  Extremities: Without clubbing, cyanosis or edema  Neuro: Cranial nerves II through XII are grossly intact.  Psych: No anxiety, judgement intact.          Current Facility-Administered Medications:   •  calcium carbonate (TUMS) chewable tab 500 mg, 500 mg, Oral, TID PRN, PATRICE Reyna.P.RROMANA, 500 mg at 02/04/22 0300  •  tadalafil (CIALIS) TABS 20 mg, 20 mg, Oral, DAILY, Fredrick Xiao M.D., 20 mg at 02/05/22 1100  •  tacrolimus (PROGRAF) 0.5 mg/mL oral suspension 2 mg, 2 mg, Oral, Q12HRS, Tami Cheek M.D., 2 mg at 02/05/22 1212  •  mycophenolate (CELLCEPT) capsule 250 mg, 250 mg, Oral, BID, Tami Cheek M.D., 250 mg at 02/05/22 1210  •  magnesium oxide (MAG-OX) tablet 400 mg, 400 mg, Oral, DAILY, Kimberly Burr M.D., 400 mg at 02/05/22 1211  •  [DISCONTINUED] oxyCODONE immediate-release (ROXICODONE) tablet 2.5 mg, 2.5 mg, Oral, Q3HRS PRN **OR** [DISCONTINUED] oxyCODONE immediate-release (ROXICODONE) tablet 5 mg, 5 mg, Oral, Q3HRS PRN, 5 mg at 02/02/22 1026 **OR** HYDROmorphone (Dilaudid) injection 0.25 mg, 0.25 mg, Intravenous, Q4HRS PRN, Kimberly Burr M.D., 0.25 mg at 02/04/22 2345  •  gabapentin (NEURONTIN) capsule 100 mg, 100 mg, Oral, TID, Kimberly Burr M.D., 100 mg at 02/05/22 1210  •  oxyCODONE immediate-release (ROXICODONE) tablet 5 mg, 5 mg, Oral, Q4HRS PRN, 5 mg at 02/04/22 2234 **OR** oxyCODONE CR (OXYCONTIN) tablet 10 mg, 10 mg, Oral, Q4HRS PRN, Kimberly Burr M.D., 10 mg at 02/05/22 1217  •  senna-docusate (PERICOLACE or SENOKOT S) 8.6-50 MG per tablet 2 Tablet, 2 Tablet, Oral, BID, 2 Tablet at 02/05/22 1211 **AND** polyethylene  glycol/lytes (MIRALAX) PACKET 1 Packet, 1 Packet, Oral, QDAY PRN **AND** magnesium hydroxide (MILK OF MAGNESIA) suspension 30 mL, 30 mL, Oral, QDAY PRN **AND** bisacodyl (DULCOLAX) suppository 10 mg, 10 mg, Rectal, QDAY PRN, Fredrick Xiao M.D.  •  NS infusion, , Intravenous, Continuous, Fredrick Xiao M.D., Last Rate: 75 mL/hr at 02/05/22 0201, Restarted at 02/05/22 0201  •  enoxaparin (LOVENOX) inj 40 mg, 40 mg, Subcutaneous, DAILY, Fredrick Xiao M.D., 40 mg at 02/05/22 1211  •  acetaminophen (Tylenol) tablet 650 mg, 650 mg, Oral, Q6HRS PRN, Fredrick Xiao M.D.  •  Notify provider if pain remains uncontrolled, , , CONTINUOUS **AND** Use the Numeric Rating Scale (NRS), Jimenez-Baker Faces (WBF), or FLACC on regular floors and Critical-Care Pain Observation Tool (CPOT) on ICUs/Trauma to assess pain, , , CONTINUOUS **AND** Pulse Ox, , , CONTINUOUS **AND** Pharmacy Consult Request ...Pain Management Review 1 Each, 1 Each, Other, PHARMACY TO DOSE **AND** If patient difficult to arouse and/or has respiratory depression (respiratory rate of 10 or less), stop any opiates that are currently infusing and call a Rapid Response., , , CONTINUOUS, Fredrick Xiao M.D.  •  labetalol (NORMODYNE/TRANDATE) injection 10 mg, 10 mg, Intravenous, Q4HRS PRN, Fredrick Xiao M.D.  •  ondansetron (ZOFRAN) syringe/vial injection 4 mg, 4 mg, Intravenous, Q4HRS PRN, Fredrick Xiao M.D., 4 mg at 02/04/22 1945  •  ondansetron (ZOFRAN ODT) dispertab 4 mg, 4 mg, Oral, Q4HRS PRN, Fredrick Xiao M.D., 4 mg at 02/03/22 0633  •  promethazine (PHENERGAN) tablet 12.5-25 mg, 12.5-25 mg, Oral, Q4HRS PRN, Fredrick Xiao M.D.  •  promethazine (PHENERGAN) suppository 12.5-25 mg, 12.5-25 mg, Rectal, Q4HRS PRN, Fredrick Xiao M.D.  •  prochlorperazine (COMPAZINE) injection 5-10 mg, 5-10 mg, Intravenous, Q4HRS PRN, Fredrick Xiao M.D.  •  albuterol inhaler 2 Puff, 2 Puff, Inhalation, Q4H PRN (RT), Fredrick Xiao M.D.  •   ambrisentan (LETAIRIS) TABS 10 mg, 10 mg, Oral, DAILY, Fredrick Xiao M.D., 10 mg at 02/05/22 1100  •  fludrocortisone (FLORINEF) tablet 0.1 mg, 0.1 mg, Oral, DAILY, Fredrick Xiao M.D., 0.1 mg at 02/05/22 1210  •  lacosamide (VIMPAT) tablet 100 mg, 100 mg, Oral, BID, Fredrick Xiao M.D., 100 mg at 02/05/22 1209  •  levETIRAcetam (KEPPRA) tablet 1,500 mg, 1,500 mg, Oral, BID, ALEJO AngelDReal, 1,500 mg at 02/05/22 1210  •  levothyroxine (SYNTHROID) tablet 137 mcg, 137 mcg, Oral, AM ES, Fredrick Xiao M.D., 137 mcg at 02/05/22 1211  •  omeprazole (PRILOSEC) capsule 40 mg, 40 mg, Oral, DAILY, Fredrick Xiao M.D., 40 mg at 02/04/22 0509  •  pravastatin (PRAVACHOL) tablet 20 mg, 20 mg, Oral, DAILY, Fredrick Xiao M.D., 20 mg at 02/05/22 1210  •  sertraline (Zoloft) tablet 100 mg, 100 mg, Oral, DAILY, Fredrick Xiao M.D., 100 mg at 02/04/22 2151  •  therapeutic multivitamin-minerals (THERAGRAN-M) tablet 1 Tablet, 1 Tablet, Oral, Q EVENING, Fredrick Xiao M.D., 1 Tablet at 02/04/22 1820  •  traZODone (DESYREL) tablet 50 mg, 50 mg, Oral, QHS, Fredrick Xiao M.D., 50 mg at 02/04/22 2151      Fluids  No intake or output data in the 24 hours ending 02/05/22 1248    Laboratory  Recent Labs     02/03/22  0223   WBC 1.7*   RBC 3.00*   HEMOGLOBIN 8.0*   HEMATOCRIT 26.3*   MCV 87.7   MCH 26.7*   MCHC 30.4*   RDW 49.0   PLATELETCT 83*   MPV 9.6     Recent Labs     02/03/22  0220 02/04/22  1116   SODIUM 143 142   POTASSIUM 3.8 3.6   CHLORIDE 110 110   CO2 29 25   GLUCOSE 94 164*   BUN 6* 4*   CREATININE 0.53 0.63   CALCIUM 8.0* 8.5                   Imaging  MR-ABDOMEN-WITH & W/O   Final Result         1.  Ill-defined hypointense lesion in the liver is likely related to prior surgery. There are surgical clips adjacent to the lesion. It is unchanged from prior exams dating back to 2018.      2.  Suggestion of portal hypertension with portal venous distention, small amount of ascites, and esophageal  varices      3.  Multiple splenic cysts as seen on prior exams.      4.  Colonic wall thickening in the partially visualized bowel suggests colitis.                           CT-ABDOMEN-PELVIS WITH   Final Result         1.  Fluid-filled reactive small bowel loops in left midabdomen suggests ileus and/or enteritis, recommend radiographic follow-up to resolution to exclude progression to obstructive changes as clinically appropriate.   2.  Low-density lesion along the inferior margin of the liver, indeterminate and new since prior studies in 2018, recommend follow-up 3 phase CT liver or MRI of the liver with contrast for further characterization.   3.  Small hiatal hernia   4.  Low-density splenic lesions, appearance favors splenic cyst.   5.  Atherosclerosis      NM-GASTRIC EMPTYING    (Results Pending)   CT-HEAD W/O    (Results Pending)        Assessment/Plan  Moderate protein-calorie malnutrition (HCC)- (present on admission)  Assessment & Plan  No dietary restrictions.  Supplements  Dietitian consult      Seizures (HCC)- (present on admission)  Assessment & Plan  History of  Continue Keppra and Vimpat    Abdominal pain  Assessment & Plan  Patient has been having constant periumbilical and left quadrant abdominal pain started 2 months ago.  Imaging studies results as above.  GI consulted  Possible EGD in the morning.  Keep n.p.o. at midnight.  2/3: Planning for EGD this morning.  2/4: EGD was unremarkable.  GI recommending gastric emptying study to rule out gastroparesis.  2/5: Planning for gastric emptying study this morning.    Primary pulmonary hypertension (HCC)- (present on admission)  Assessment & Plan  Continue ambrisentan, Cialis, supplemental oxygen on 3 to 5 L.      History of Clostridium difficile infection- (present on admission)  Assessment & Plan  Monitor for diarrhea, consider stool PCR if diarrhea reoccurs.  2/2: No bowel movement since last night.  No diarrhea reported.    Headache  Assessment &  Plan  Complaint of worsening headache.  Given her history of prior intracranial bleed we will check head CT.    Status post liver transplant Dr. Canada ([facility])- (present on admission)  Assessment & Plan  Follow-up with transplant as outpatient  Continue tacrolimus, CellCept and prednisone.  Check tacrolimus levels in the morning prior to next dose.  2/2: Tacrolimus levels non toxic.        VTE prophylaxis: enoxaparin ppx    I have performed a physical exam and reviewed and updated ROS and Plan today (2/5/2022). In review of yesterday's note (2/4/2022), there are no changes except as documented above.

## 2022-02-05 NOTE — PROGRESS NOTES
Gastroenterology Progress Note     Author: Osman Matt M.D.   Date & Time Created: 2022 2:08 PM    Chief Complaint:  Vomiting/diarrhea    Interval History:  Continues with vomiting after meals typically 1 hour post-prandial.  Watery diarrhea.  Persistent headaches.  Gastric emptying study completed earlier today, results pending.    Review of Systems:  Review of Systems   Respiratory: Negative.    Cardiovascular: Negative.    Gastrointestinal: Positive for diarrhea and vomiting.   Neurological: Positive for headaches.       Physical Exam:  Physical Exam    Labs:          Recent Labs     22  1116   SODIUM 143 142   POTASSIUM 3.8 3.6   CHLORIDE 110 110   CO2 29 25   BUN 6* 4*   CREATININE 0.53 0.63   MAGNESIUM  --  1.7   PHOSPHORUS  --  3.0   CALCIUM 8.0* 8.5     Recent Labs     22  1116   ALTSGPT 19 24   ASTSGOT 29 34   ALKPHOSPHAT 42 44   TBILIRUBIN <0.2 <0.2   GLUCOSE 94 164*     Recent Labs     22   RBC 3.00*   HEMOGLOBIN 8.0*   HEMATOCRIT 26.3*   PLATELETCT 83*     Recent Labs     22  1116   WBC  --  1.7*  --    NEUTSPOLYS  --  31.10*  --    LYMPHOCYTES  --  48.50*  --    MONOCYTES  --  12.00  --    EOSINOPHILS  --  7.20*  --    BASOPHILS  --  0.60  --    ASTSGOT 29  --  34   ALTSGPT 19  --  24   ALKPHOSPHAT 42  --  44   TBILIRUBIN <0.2  --  <0.2     Hemodynamics:  Temp (24hrs), Av.5 °C (97.7 °F), Min:36.3 °C (97.3 °F), Max:37 °C (98.6 °F)  Temperature: 36.3 °C (97.4 °F)  Pulse  Av.8  Min: 52  Max: 89   Blood Pressure: 155/80     Respiratory:    Respiration: 16, Pulse Oximetry: 100 %     Work Of Breathing / Effort: Mild  RUL Breath Sounds: Clear, RML Breath Sounds: Clear, RLL Breath Sounds: Diminished, MANJINDER Breath Sounds: Clear, LLL Breath Sounds: Diminished  Fluids:  No intake or output data in the 24 hours ending 22 1408     GI/Nutrition:  Orders Placed This Encounter   Procedures   • Diet Order Diet:  Renal     Standing Status:   Standing     Number of Occurrences:   1     Order Specific Question:   Diet:     Answer:   Renal [8]     Medical Decision Making, by Problem:  Active Hospital Problems    Diagnosis    • Moderate protein-calorie malnutrition (HCC) [E44.0]    • Seizures (HCC) [R56.9]    • Abdominal pain [R10.9]    • History of Clostridium difficile infection [Z86.19]    • Headache [R51.9]    • Status post liver transplant Dr. Canada ([facility]) [Z94.4]    • Primary pulmonary hypertension (HCC) [I27.0]        Plan:  F/U gastric emptying study.  If patient continues with post-prandial vomiting start Reglan 10 mg tid even if emptying study is unremarkable.  Pending head CT w/u HAs.  Will follow.    Quality-Core Measures

## 2022-02-06 PROBLEM — R51.9 HEADACHE: Status: RESOLVED | Noted: 2017-03-22 | Resolved: 2022-01-01

## 2022-02-06 NOTE — CARE PLAN
The patient is Stable - Low risk of patient condition declining or worsening    Shift Goals  Clinical Goals: Pain control  Patient Goals: pain control    Progress made toward(s) clinical / shift goals:  Pt frequently assessed for pain and medicated per MAR.       Problem: Knowledge Deficit - Standard  Goal: Patient and family/care givers will demonstrate understanding of plan of care, disease process/condition, diagnostic tests and medications  Outcome: Progressing     Problem: Pain - Standard  Goal: Alleviation of pain or a reduction in pain to the patient’s comfort goal  Outcome: Progressing     Problem: Fall Risk  Goal: Patient will remain free from falls  Outcome: Progressing       Patient is not progressing towards the following goals:

## 2022-02-06 NOTE — DISCHARGE INSTRUCTIONS
Colitis    Colitis is inflammation of the colon. Colitis may last a short time (be acute), or it may last a long time (become chronic).  What are the causes?  This condition may be caused by:  · Viruses.  · Bacteria.  · Reaction to medicine.  · Certain autoimmune diseases such as Crohn's disease or ulcerative colitis.  · Radiation treatment.  · Decreased blood flow to the bowel (ischemia).  What are the signs or symptoms?  Symptoms of this condition include:  · Watery diarrhea.  · Passing bloody or tarry stool.  · Pain.  · Fever.  · Vomiting.  · Tiredness (fatigue).  · Weight loss.  · Bloating.  · Abdominal pain.  · Having fewer bowel movements than usual.  · A strong and sudden urge to have a bowel movement.  · Feeling like the bowel is not empty after a bowel movement.  How is this diagnosed?  This condition is diagnosed with a stool test or a blood test.  You may also have other tests, such as:  · X-rays.  · CT scan.  · Colonoscopy.  · Endoscopy.  · Biopsy.  How is this treated?  Treatment for this condition depends on the cause. The condition may be treated by:  · Resting the bowel. This involves not eating or drinking for a period of time.  · Fluids that are given through an IV.  · Medicine for pain and diarrhea.  · Antibiotic medicines.  · Cortisone medicines.  · Surgery.  Follow these instructions at home:  Eating and drinking    · Follow instructions from your health care provider about eating or drinking restrictions.  · Drink enough fluid to keep your urine pale yellow.  · Work with a dietitian to determine which foods cause your condition to flare up.  · Avoid foods that cause flare-ups.  · Eat a well-balanced diet.  General instructions  · If you were prescribed an antibiotic medicine, take it as told by your health care provider. Do not stop taking the antibiotic even if you start to feel better.  · Take over-the-counter and prescription medicines only as told by your health care provider.  · Keep  all follow-up visits as told by your health care provider. This is important.  Contact a health care provider if:  · Your symptoms do not go away.  · You develop new symptoms.  Get help right away if you:  · Have a fever that does not go away with treatment.  · Develop chills.  · Have extreme weakness, fainting, or dehydration.  · Have repeated vomiting.  · Develop severe pain in your abdomen.  · Pass bloody or tarry stool.  Summary  · Colitis is inflammation of the colon. Colitis may last a short time (be acute), or it may last a long time (become chronic).  · Treatment for this condition depends on the cause and may include resting the bowel, taking medicines, or having surgery.  · If you were prescribed an antibiotic medicine, take it as told by your health care provider. Do not stop taking the antibiotic even if you start to feel better.  · Get help right away if you develop severe pain in your abdomen.  · Keep all follow-up visits as told by your health care provider. This is important.  This information is not intended to replace advice given to you by your health care provider. Make sure you discuss any questions you have with your health care provider.  Document Released: 01/25/2006 Document Revised: 06/20/2019 Document Reviewed: 06/20/2019  [employer] Patient Education © 2020 [employer]        Depression / Suicide Risk    As you are discharged from this [facility] facility, it is important to learn how to keep safe from harming yourself.    Recognize the warning signs:  · Abrupt changes in personality, positive or negative- including increase in energy   · Giving away possessions  · Change in eating patterns- significant weight changes-  positive or negative  · Change in sleeping patterns- unable to sleep or sleeping all the time   · Unwillingness or inability to communicate  · Depression  · Unusual sadness, discouragement and loneliness  · Talk of wanting to die  · Neglect of personal  appearance   · Rebelliousness- reckless behavior  · Withdrawal from people/activities they love  · Confusion- inability to concentrate     If you or a loved one observes any of these behaviors or has concerns about self-harm, here's what you can do:  · Talk about it- your feelings and reasons for harming yourself  · Remove any means that you might use to hurt yourself (examples: pills, rope, extension cords, firearm)  · Get professional help from the community (Mental Health, Substance Abuse, psychological counseling)  · Do not be alone:Call your Safe Contact- someone whom you trust who will be there for you.  · Call your local CRISIS HOTLINE 715-2163 or 782-452-7822  · Call your local Children's Mobile Crisis Response Team [place] (855) 736-5825 or www.InHiro  · Call the toll free National Suicide Prevention Hotlines   · National Suicide Prevention Lifeline 370-114-ZWXY (8403)  · National Hope Line Network 800-SUICIDE (430-1556)

## 2022-02-06 NOTE — PROGRESS NOTES
Received report and assumed care at shift change. A&O x 4,  Compliant with cares. Patient expressed concern regarding CT scan result. Medicated with PRN medication for pain per MAR. Fall precautions in place, bed locked and in lowest position. Call light within reach. Needs attended to.

## 2022-02-06 NOTE — PROGRESS NOTES
Assumed care of pt at shift change. Pt is on 3L of oxygen via NC with no signs of acute distress. A&Ox4. Medicated with PRN Oxycodone for 8/10 pain to abdomen and 10/10  Pain to her head.  Running NS at 75 mL/hr.POC discussed with patient. All safety measures in place. Call light and personal belongings by bedside. Bed locked and in lowest position. Hourly rounding in place

## 2022-02-06 NOTE — CARE PLAN
The patient is Stable - Low risk of patient condition declining or worsening    Shift Goals  Clinical Goals: Pain control  Patient Goals: pain control    Problem: Fall Risk  Goal: Patient will remain free from falls  Outcome: Progressing     Progress made toward(s) clinical / shift goals:  Medicated for pain per MAR and reassessed.     Patient is not progressing towards the following goals:

## 2022-02-06 NOTE — PROGRESS NOTES
Pt dc'd home. IV removed.  Pt left unit via WC with escort. Personal belongings with pt when leaving unit. Pt given discharge instructions prior to leaving unit including where to  prescriptions and when to follow-up; verbalizes understanding. Copy of discharge instructions with pt and in the chart.

## 2022-02-07 NOTE — PROGRESS NOTES
RN Transitional Care Management discharge follow up call completed. Point of contact, pt spouse Otis. Patient Spouse did not have any clinical questions regarding medications or follow up care. Per Spouse pt has headache and is urinating frequently. Educated pt to maintain hydration along with frequent urination.  Patient does not have discharge follow up appointment scheduled with PCP, Spouse stated he preferes to set appointment with Provider office later today.  Please route chart back to RN if additional follow up is needed/requested.         Thank you!     Nomi Platt Care Coordinator [phone]

## 2022-02-08 NOTE — TELEPHONE ENCOUNTER
ESTABLISHED PATIENT PRE-VISIT PLANNING     Patient was NOT contacted to complete PVP.     Note: Patient will not be contacted if there is no indication to call.     1.  Reviewed notes from the last few office visits within the medical group: Yes    2.  If any orders were placed at last visit or intended to be done for this visit (i.e. 6 mos follow-up), do we have Results/Consult Notes?         •  Labs - Labs ordered, completed on 1/4/22 and results are in chart.  Note: If patient appointment is for lab review and patient did not complete labs, check with provider if OK to reschedule patient until labs completed.       •  Imaging - Imaging was not ordered at last office visit.       •  Referrals - No referrals were ordered at last office visit.    3. Is this appointment scheduled as a Hospital Follow-Up? Yes, visit was at [facility].     4.  Immunizations were updated in Epic using Reconcile Outside Information activity? Yes    5.  Patient is due for the following Health Maintenance Topics:   Health Maintenance Due   Topic Date Due   • Annual Wellness Visit  Never done   • IMM ZOSTER VACCINES (2 of 2) 01/29/2019         6.  AHA (Pulse[employer]) form printed for Provider? Email sent to [facility] requesting form

## 2022-02-10 NOTE — PROGRESS NOTES
Medication chart review for [facility] services    PCP:  Elisa Phillip M.D.  [address] [address]  Fax: [phone]    Current medication list     Current Outpatient Medications:   •  Home Care Oxygen, 5 L/min, Inhalation, Continuous  •  vitamin C, 500 mg, Oral, DAILY  •  ondansetron, 4 mg, Oral, Q8HRS PRN (Patient not taking: Reported on 2/9/2022)  •  metoclopramide, Three times daily after meals (Patient taking differently: Three times daily after meals  Indications: Nausea and Vomiting)  •  magnesium oxide, 400 mg, Oral, DAILY (Patient taking differently: 400 mg, Oral, DAILY, Indications: Suppliment)  •  ondansetron, 4 mg, Oral, Q6HRS PRN  •  traZODone, 50 mg, Oral, QHS (Patient taking differently: 50 mg, Oral, EVERY BEDTIME, Indications: Insomnia)  •  sertraline, 100 mg, Oral, DAILY (Patient taking differently: 100 mg, Oral, DAILY, Indications: Major Depressive Disorder)  •  levetiracetam, TAKE 2 TABLETS BY ENTERAL TUBE ROUTE 2 TIMES A DAY FOR 30 DAYS. (Patient taking differently: 1,500 mg, Oral, 2 TIMES DAILY)  •  fludrocortisone, 0.1 mg, Oral, DAILY  •  lacosamide, 100 mg, Oral, BID (Patient taking differently: 100 mg, Oral, 2 TIMES DAILY, Indications: Tonic Clonic Epilepsy)  •  potassium chloride SA, 20 mEq, Oral, DAILY (Patient taking differently: 20 mEq, Oral, DAILY, Indications: Hypokalemia)  •  albuterol, 2 Puff, Inhalation, Q4HRS PRN  •  furosemide, 20 mg, Oral, DAILY  •  ambrisentan, 10 mg, Oral, DAILY (Patient taking differently: 10 mg, Oral, DAILY, Indications: Pulmonary Arterial Hypertension)  •  gabapentin, 100 mg, Oral, BID (Patient taking differently: 100 mg, Oral, 2 TIMES DAILY, Indications: Neuropathic Pain)  •  levothyroxine, 137 mcg, Oral, AM ES (Patient taking differently: 137 mcg, Oral, EACH MORNING ON EMPTY STOMACH, Indications: Hypothyroidism)  •  tacrolimus, 2 mg, Oral, BID  •  tadalafil, 20 mg, Oral, DAILY (Patient taking differently: 20 mg, Oral,  DAILY, Indications: Erectile Dysfunction)  •  omeprazole, 40 mg, Oral, DAILY (Patient taking differently: 40 mg, Oral, DAILY, Indications: Heartburn)  •  pravastatin, 20 mg, Oral, DAILY (Patient taking differently: 20 mg, Oral, DAILY, Indications: Hyperlipidemia)  •  mycophenolate, 250 mg, Oral, BID  •  oxyCODONE immediate release, 10 mg, Oral, Q6HRS PRN  •  therapeutic multivitamin-minerals, 1 Tablet, Oral, Q EVENING    Allergies   Allergen Reactions   • Nsaids Unspecified     Can not take due to hx of liver transplant    • Rhubarb Anaphylaxis   • Organic Nitrates Unspecified     Nitroglycerin products should be avoided with the use of PDE-5 inhibitors as the combination can result in severe hypotension.  RD clarified with pt: Nitrates in food are okay and pt does not want nitrates to be listed as food allergy. Pt only avoids medications with nitrates.    • Other Drug      Any medication that may interact with cyclosporine, tacrolimus, sirolimus, or prograf (due to hx of liver transplant)        Labs     Lab Results   Component Value Date/Time    SODIUM 142 02/05/2022 02:09 PM    POTASSIUM 4.2 02/05/2022 02:09 PM    CHLORIDE 108 02/05/2022 02:09 PM    CO2 25 02/05/2022 02:09 PM    GLUCOSE 104 (H) 02/05/2022 02:09 PM    BUN 5 (L) 02/05/2022 02:09 PM    CREATININE 0.55 02/05/2022 02:09 PM     Lab Results   Component Value Date/Time    ALKPHOSPHAT 47 02/05/2022 02:09 PM    ASTSGOT 33 02/05/2022 02:09 PM    ALTSGPT 22 02/05/2022 02:09 PM    TBILIRUBIN 0.2 02/05/2022 02:09 PM    INR 1.15 (H) 12/25/2021 07:40 PM    ALBUMIN 3.1 (L) 02/05/2022 02:09 PM    ALBUMIN 2.04 (L) 09/13/2021 03:10 PM        Assessment and Plan:   • Received referral from [facility]. Medications reviewed.       Piero Forrest, PharmD, MS, BCACP, LCC  [facility]  Phone [phone] fax [phone]    This note was created using voice recognition software (Dragon). The accuracy of the dictation is limited by the  abilities of the software. I have reviewed the note prior to signing, however some errors in grammar and context are still possible. If you have any questions related to this note please do not hesitate to contact our office.

## 2022-02-15 NOTE — CASE COMMUNICATION
Quality Review for 2.9.22 SOC OASIS performed on by MERVAT Romo RN on 2.16.2022:    Edits completed by MERVAT Romo RN:  1. Changed  to 2.9.22 date of LSOC order  2. Added #1 to , pt fell and had a SDH in Aug 2021  3. Changed  to 3 per narrative pt is SOB with min activity  4. Changed , , ,  to 2,  to 3, [id number] C,E,F,G,H to 3, [id number],[id number],P to 4 per narrative pt needs supervision with ambulation, tra nsfers and min assist with dressing. Changed  to 5 pt would need showering DME to safely perform  5. Changed  to 3 per ambulation score   6. Added fall risk and high risk medications to the safety measures  7.  Updated F2F data  8. Added COPD to the care plan

## 2022-02-15 NOTE — PROGRESS NOTES
Annual Health Assessment Questions:    1.  Are you currently engaging in any exercise or physical activity? Yes    2.  How would you describe your mood or emotional well-being today? anxious    3.  Have you had any falls in the last year? Yes    4.  Have you noticed any problems with your balance or had difficulty walking? Yes    5.  In the last six months have you experienced any leakage of urine? Yes    6. DPA/Advanced Directive: Patient has Advanced Directive on file.

## 2022-02-15 NOTE — PROGRESS NOTES
Subjective:     Chief Complaint   Patient presents with   • Follow-Up         HPI:   Roxana presents today with multiple chornic health conditions. Has lots of visual issues, seizures disorders, craniotomy. Doesn't drive due to this. Using her alprazolam three times per day. Would like refills.  She reports she does not feel like herself and does not feel like she likes herself.  She is very anxious with medical issues in general.  She misses her liver specialist from [place] but does have an appointment with them upcoming    Also reports lumps in lower abdomen. All imaging done on upper area, and this worries her.   Does have Gi appt upcoming, also a GI team in [place].   Sees Dr Matt here on Thursday. Last colonoscopy probably a decade ago.     Would like refill of Relistor, for idiopathic constipation.     She is very bothered about anxiety as well as sleep in general.  She like some refills are some different options for this.  She reports she is been on trazodone for years but it seems to just not be helpful.  She does see neurology with Dr. Portillo but is unhappy with her care there.  She like to switch to Dr. Mc but was told by the neurology office that she is allowed to switch providers and that Dr. Mc is completely full.  Home O2 remained stable.  She does remain on chronic oxycodone chronic pain.          Current Outpatient Medications Ordered in Epic   Medication Sig Dispense Refill   • ALPRAZolam (XANAX) 1 MG Tab Take 1 Tablet by mouth 3 times a day as needed for Anxiety for up to 28 days. 84 Tablet 3   • methylnaltrexone (RELISTOR) 12 MG/0.6ML Solution Inject 0.6 mL under the skin one time for 1 dose. 2.1 mL 3   • sertraline (ZOLOFT) 100 MG Tab Take 1.5 Tablets by mouth every day. 135 Tablet 3   • ondansetron (ZOFRAN ODT) 4 MG TABLET DISPERSIBLE Take 1 Tablet by mouth every 8 hours as needed for Nausea. 90 Tablet 0   • Home Care Oxygen Inhale 5 L/min continuous. Oxygen dose range:  5L/min  Respiratory route via: Nasal Cannula   Oxygen supplier: Preferred      Indications: shortness of breath     • Ascorbic Acid (VITAMIN C) 500 MG Chew Tab Chew 500 mg every day. Indications: Inadequate Vitamin C     • metoclopramide (REGLAN) 10 MG Tab Three times daily after meals (Patient taking differently: Three times daily after meals  Indications: Nausea and Vomiting) 60 Tablet 0   • ondansetron (ZOFRAN ODT) 4 MG TABLET DISPERSIBLE Take 1 Tablet by mouth every 6 hours as needed for Nausea. 10 Tablet 0   • levetiracetam (KEPPRA) 750 MG tablet TAKE 2 TABLETS BY ENTERAL TUBE ROUTE 2 TIMES A DAY FOR 30 DAYS. (Patient taking differently: Take 1,500 mg by mouth 2 times a day.) 120 Tablet 3   • fludrocortisone (FLORINEF) 0.1 MG Tab Take 0.1 mg by mouth every day. Indications: Blood Pressure Drop Upon Standing     • lacosamide (VIMPAT) 100 MG Tab tablet Take 1 Tablet by mouth 2 times a day for 90 days. (Patient taking differently: Take 100 mg by mouth 2 times a day. Indications: Tonic-Clonic Seizures) 60 Tablet 2   • potassium chloride SA (KDUR) 20 MEQ Tab CR Take 1 Tablet by mouth every day. (Patient taking differently: Take 20 mEq by mouth every day. Indications: Low Amount of Potassium in the Blood) 90 Tablet 3   • albuterol (PROAIR HFA) 108 (90 Base) MCG/ACT Aero Soln inhalation aerosol Inhale 2 Puffs every four hours as needed for Shortness of Breath (wheezing). 8.5 g 3   • furosemide (LASIX) 20 MG Tab Take 1 Tablet by mouth every day. Indications: Edema 30 Tablet 0   • ambrisentan (LETAIRIS) 10 MG tablet TAKE 1 TABLET BY MOUTH EVERY DAY (Patient taking differently: Take 10 mg by mouth every day. Indications: Pulmonary Arterial Hypertension) 30 Tablet 11   • gabapentin (NEURONTIN) 100 MG Cap Take 1 Capsule by mouth 2 times a day. (Patient taking differently: Take 100 mg by mouth 2 times a day. Indications: Neuropathic Pain) 180 Capsule 1   • levothyroxine (SYNTHROID) 137 MCG Tab Take 1 Tablet by mouth every  "morning on an empty stomach. (Patient taking differently: Take 137 mcg by mouth every morning on an empty stomach. Indications: Underactive Thyroid) 90 Tablet 1   • tacrolimus (PROGRAF) 1 MG Cap Take 2 mg by mouth 2 times a day. Indications: Liver Transplant Recipient     • tadalafil (CIALIS) 20 MG tablet Take 1 Tablet by mouth every day. (Patient taking differently: Take 20 mg by mouth every day. Indications: PULMONARY HTN) 90 Tablet 3   • omeprazole (PRILOSEC) 40 MG delayed-release capsule Take 1 Capsule by mouth every day. (Patient taking differently: Take 40 mg by mouth every day. Indications: Heartburn) 90 Capsule 3   • pravastatin (PRAVACHOL) 20 MG Tab Take 1 Tablet by mouth every day. (Patient taking differently: Take 20 mg by mouth every day. Indications: High Amount of Fats in the Blood) 90 Tablet 3   • mycophenolate (CELLCEPT) 250 MG Cap Take 250 mg by mouth 2 times a day. Indications: Liver Transplant Recipient     • oxyCODONE immediate release (ROXICODONE) 10 MG immediate release tablet Take 10 mg by mouth every 6 hours as needed for Moderate Pain. Take 1 tablet by mouth every 6-8 for moderate to severe pain     • therapeutic multivitamin-minerals (THERAGRAN-M) Tab Take 1 Tablet by mouth every evening. Indications: suppliment       No current Epic-ordered facility-administered medications on file.         ROS:  Gen: no fevers/chills, no changes in weight    ENT: no sore throat, no hearing loss, no bloody nose        Objective:     Exam:  /64 (BP Location: Right arm, Patient Position: Sitting, BP Cuff Size: Adult)   Pulse 72   Temp 36.6 °C (97.8 °F)   Resp 12   Ht 1.753 m (5' 9\")   Wt 62.6 kg (138 lb)   LMP 01/03/2000   SpO2 94%   BMI 20.38 kg/m²  Body mass index is 20.38 kg/m².      Gen: AAOx3, tearful.  HEENT: NCAT, EOMI, Nares patent, Mucosa moist  Resp: Normal chest wall rise and fall, not SOB, no tachypnea  Skin: no rash or abnormality of visible skin.   Psych: normal speech, not " slurred, good insight, affect flat  MSK: Moves all four limbs equally and normally, gait normal          Assessment & Plan:     62 y.o. female with the following -     1. Nausea  Reglan does not prove to be very helpful at this time.  She does find some benefit with the ondansetron but was only given a small amount from the hospital.  She would like refills of this.  To be able to take it daily.  - ondansetron (ZOFRAN ODT) 4 MG TABLET DISPERSIBLE; Take 1 Tablet by mouth every 8 hours as needed for Nausea.  Dispense: 30 Tablet; Refill: 0    2. Primary insomnia  Discussed slowly weaning off the trazodone by cutting in half for the next few days and then stopping it.  She will let us know if this causes any abrupt issues.  She can go ahead and see how she does with just taking alprazolam directly at nighttime to see if this helps her sleep.  Annamarie try other medications in the near future.  - ALPRAZolam (XANAX) 1 MG Tab; Take 1 Tablet by mouth 3 times a day as needed for Anxiety for up to 28 days.  Dispense: 84 Tablet; Refill: 3    3. Anxiety  Discussed her anxiety and adjusting her sertraline as well.  She states she has been on this for a very long time and again she repeats that she just does not like herself right now.  She is very irritable and snaps at her  a lot when she is having health crises.  We will see how going up on her sertraline to 150 seems to go in a refill sent in.  Also will keep her alprazolam going 3 times daily as filled.  - ALPRAZolam (XANAX) 1 MG Tab; Take 1 Tablet by mouth 3 times a day as needed for Anxiety for up to 28 days.  Dispense: 84 Tablet; Refill: 3  - sertraline (ZOLOFT) 100 MG Tab; Take 1.5 Tablets by mouth every day.  Dispense: 135 Tablet; Refill: 3    4. Chronic idiopathic constipation  Sent in refill of Relistor for chronic opiate-induced constipation.  - methylnaltrexone (RELISTOR) 12 MG/0.6ML Solution; Inject 0.6 mL under the skin one time for 1 dose.  Dispense: 2.1 mL;  Refill: 3    Apologized profusely for her experience with [facility] and I let her know I would pass this feedback on the chain to see if we can further improve her way of handling her care.    Also like to see her back in the next 2 weeks so that we can see how her medications have changed anything.  She will have seen GI at that point at least and may have some information on ways forward regarding some lumps she sees in her lower abdomen that she is concerned about being colonic.        Return in about 2 weeks (around 3/1/2022) for follow up meds.    Please note that this dictation was created using voice recognition software. I have made every reasonable attempt to correct obvious errors, but I expect that there are errors of grammar and possibly content that I did not discover before finalizing the note.

## 2022-02-18 ENCOUNTER — APPOINTMENT (RX ONLY)
Dept: URBAN - METROPOLITAN AREA CLINIC 4 | Facility: CLINIC | Age: 62
Setting detail: DERMATOLOGY
End: 2022-02-18

## 2022-02-18 DIAGNOSIS — L81.4 OTHER MELANIN HYPERPIGMENTATION: ICD-10-CM

## 2022-02-18 DIAGNOSIS — D17 BENIGN LIPOMATOUS NEOPLASM: ICD-10-CM

## 2022-02-18 DIAGNOSIS — L82.1 OTHER SEBORRHEIC KERATOSIS: ICD-10-CM

## 2022-02-18 PROBLEM — D48.5 NEOPLASM OF UNCERTAIN BEHAVIOR OF SKIN: Status: ACTIVE | Noted: 2022-02-18

## 2022-02-18 PROCEDURE — ? COUNSELING

## 2022-02-18 PROCEDURE — 99203 OFFICE O/P NEW LOW 30 MIN: CPT

## 2022-02-18 PROCEDURE — ? OBSERVATION

## 2022-02-18 ASSESSMENT — LOCATION SIMPLE DESCRIPTION DERM
LOCATION SIMPLE: RIGHT POSTERIOR UPPER ARM
LOCATION SIMPLE: RIGHT THIGH
LOCATION SIMPLE: LEFT POSTERIOR UPPER ARM
LOCATION SIMPLE: RIGHT TEMPLE
LOCATION SIMPLE: LEFT FOREARM

## 2022-02-18 ASSESSMENT — LOCATION DETAILED DESCRIPTION DERM
LOCATION DETAILED: LEFT PROXIMAL POSTERIOR UPPER ARM
LOCATION DETAILED: RIGHT CENTRAL TEMPLE
LOCATION DETAILED: RIGHT ANTERIOR PROXIMAL THIGH
LOCATION DETAILED: RIGHT PROXIMAL POSTERIOR UPPER ARM
LOCATION DETAILED: LEFT VENTRAL PROXIMAL FOREARM

## 2022-02-18 ASSESSMENT — LOCATION ZONE DERM
LOCATION ZONE: LEG
LOCATION ZONE: FACE
LOCATION ZONE: ARM

## 2022-02-22 NOTE — DISCHARGE SUMMARY
Discharge Summary    CHIEF COMPLAINT ON ADMISSION  Chief Complaint   Patient presents with   • LLQ Pain     Patient presents for LLQ pain which began two weeks ago.   • Nausea/Vomiting/Diarrhea     Given 4mg Zofran and 100mcg Im Fentayl with EMS. 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  Patient stated that she has been having constant periumbilical pain that is usually aggravated with p.o. intake.  This associated with nausea and vomiting 30 minutes after any meal.  She admits to losing almost 30 pounds over the last 2-months.  She has been trying to make an appointment with outpatient GI but was unable to get any sooner appointment so she presented to ER for further evaluation.  In ER noted to be hemodynamically stable.  Afebrile.  Respiratory status at baseline with oxygen demands.  Lab work was significant for pancytopenia.  UA was negative for UTI.  Lactic acid was within normal limits.  Lipase was within normal limits.  CT scan of the abdomen pelvis suggested ileus versus enteritis.  There was low-density lesion along the inferior margin of the liver measured 1.6 cm that is new since prior study in 2018.  MRI of the abdomen was done which showed  ill-defined hypointense lesion in the liver likely related to prior surgery with colonic wall thickening in the partially visualized bowel suggesting colitis.  Findings discussed with GI.  Considering her provoked abdominal pain associate with nausea and vomiting, GI suggested upper endoscopy which came back unremarkable.  Patient had gastric emptying study which came back unremarkable as well.  Continue to be hemodynamically clinically stable.  Felt that her symptoms likely related to narcotic bowels.  Was cleared for discharge from medical standpoint.  Therefore, she is discharged in guarded and stable condition to home with close outpatient follow-up.    The patient met 2-midnight criteria for an inpatient stay at the time of discharge.    Discharge Date  2/6/2022    FOLLOW UP ITEMS POST DISCHARGE  pcp in 1 wk     DISCHARGE DIAGNOSES  Principal Problem (Resolved):    Colitis POA: Yes  Active Problems:    Status post liver transplant Dr. Canada ([facility]) POA: Yes      Overview: -December 2011: Status post liver transplant for end stage liver disease       at [facility], followed by Dr. Canada.                History of Clostridium difficile infection POA: Yes    Primary pulmonary hypertension (HCC) POA: Yes    Abdominal pain POA: Unknown    Seizures (HCC) POA: Yes    Moderate protein-calorie malnutrition (HCC) POA: Yes  Resolved Problems:    Headache POA: Unknown    Liver lesion POA: Unknown    Ileus (HCC) POA: Unknown      FOLLOW UP  Future Appointments   Date Time Provider Department Center   2/24/2022 To Be Determined ARISTIDES Everett.N. [facility] None   3/2/2022 To Be Determined Radha Pollard R.N. [facility] None   3/4/2022  1:00 PM Halley Levine M.D. [facility] None   3/8/2022 To Be Determined Radha Terjeremi R.N. [facility] None   3/9/2022  9:00 AM Dl Jimenez M.D. [facility] None   3/15/2022 To Be Determined Radha Pollard R.N. [facility] None   3/22/2022 To Be Determined Radha Pollard R.N. [facility] None   4/8/2022  1:20 PM  Gwyn Gaming M.D. [facility] None   4/13/2022 11:15 AM Maxwell Phan M.D. [facility] None     Elisa Phillip M.D.  [address]  [address]  [facility] [id number] [phone]          Elisa Phillip M.D.  [address] [facility] [address]  [address]  [facility] [address] [phone]            MEDICATIONS ON DISCHARGE     Medication List      START taking these medications      Instructions   metoclopramide 10 MG Tabs  Commonly known as: REGLAN   Doctor's comments: TID after meals  Three times daily after meals        CHANGE how you take these medications      Instructions   levetiracetam 750 MG tablet  What changed: See the new instructions.  Commonly known as: KEPPRA   TAKE 2 TABLETS BY ENTERAL TUBE ROUTE 2 TIMES A DAY FOR 30 DAYS.     ondansetron 4 MG Tbdp  What changed: when to take this  Commonly known as: ZOFRAN ODT   Take 1 Tablet by mouth every 6 hours as needed for Nausea.  Dose: 4 mg        CONTINUE taking these medications      Instructions   albuterol 108 (90 Base) MCG/ACT Aers inhalation aerosol  Commonly known as: ProAir HFA   Inhale 2 Puffs every four hours as needed for Shortness of Breath (wheezing).  Dose: 2 Puff     ambrisentan 10 MG tablet  Commonly known as: LETAIRIS   TAKE 1 TABLET BY MOUTH EVERY DAY  Dose: 10 mg     fludrocortisone 0.1 MG Tabs  Commonly known as: FLORINEF   Take 0.1 mg by mouth every day. Indications: Blood Pressure Drop Upon Standing  Dose: 0.1 mg     furosemide 20 MG Tabs  Commonly known as: LASIX   Take 1 Tablet by mouth every day. Indications: Edema  Dose: 20 mg     gabapentin 100 MG Caps  Commonly known as: NEURONTIN   Take 1 Capsule by mouth 2 times a day.  Dose: 100 mg     lacosamide 100 MG Tabs tablet  Commonly known as: VIMPAT   Take 1 Tablet by mouth 2 times a day for 90 days.  Dose: 100 mg     levothyroxine 137 MCG Tabs  Commonly known as: SYNTHROID   Take 1 Tablet by mouth every morning on an empty stomach.  Dose: 137 mcg     mycophenolate 250 MG Caps  Commonly known as:  CELLCEPT   Take 250 mg by mouth 2 times a day. Indications: Liver Transplant Recipient  Dose: 250 mg     omeprazole 40 MG delayed-release capsule  Commonly known as: PRILOSEC   Take 1 Capsule by mouth every day.  Dose: 40 mg     oxyCODONE immediate release 10 MG immediate release tablet  Commonly known as: ROXICODONE   Take 10 mg by mouth every 6 hours as needed for Moderate Pain. Take 1 tablet by mouth every 6-8 for moderate to severe pain  Dose: 10 mg     potassium chloride SA 20 MEQ Tbcr  Commonly known as: Kdur   Take 1 Tablet by mouth every day.  Dose: 20 mEq     pravastatin 20 MG Tabs  Commonly known as: PRAVACHOL   Take 1 Tablet by mouth every day.  Dose: 20 mg     tacrolimus 1 MG Caps  Commonly known as: PROGRAF   Take 2 mg by mouth 2 times a day. Indications: Liver Transplant Recipient  Dose: 2 mg     tadalafil 20 MG tablet  Commonly known as: CIALIS   Take 1 Tablet by mouth every day.  Dose: 20 mg     therapeutic multivitamin-minerals Tabs   Take 1 Tablet by mouth every evening. Indications: suppliment  Dose: 1 Tablet        ASK your doctor about these medications      Instructions   magnesium oxide 400 MG Tabs tablet  Commonly known as: MAG-OX  Ask about: Should I take this medication?   Take 1 Tablet by mouth every day for 5 days.  Dose: 400 mg            Allergies  Allergies   Allergen Reactions   • Nsaids Unspecified     Can not take due to hx of liver transplant    • Rhubarb Anaphylaxis   • Organic Nitrates Unspecified     Nitroglycerin products should be avoided with the use of PDE-5 inhibitors as the combination can result in severe hypotension.  RD clarified with pt: Nitrates in food are okay and pt does not want nitrates to be listed as food allergy. Pt only avoids medications with nitrates.    • Other Drug      Any medication that may interact with cyclosporine, tacrolimus, sirolimus, or prograf (due to hx of liver transplant)        DIET  No orders of the defined types were placed in this  encounter.      ACTIVITY  As tolerated.  Weight bearing as tolerated    CONSULTATIONS  GI     PROCEDURES  As above     LABORATORY  Lab Results   Component Value Date    SODIUM 142 02/05/2022    POTASSIUM 4.2 02/05/2022    CHLORIDE 108 02/05/2022    CO2 25 02/05/2022    GLUCOSE 104 (H) 02/05/2022    BUN 5 (L) 02/05/2022    CREATININE 0.55 02/05/2022        Lab Results   Component Value Date    WBC 1.7 (LL) 02/03/2022    HEMOGLOBIN 8.0 (L) 02/03/2022    HEMATOCRIT 26.3 (L) 02/03/2022    PLATELETCT 83 (L) 02/03/2022        Total time of the discharge process exceeds 35 minutes.

## 2022-02-24 NOTE — TELEPHONE ENCOUNTER
Received request via: Patient    Was the patient seen in the last year in this department? Yes    Does the patient have an active prescription (recently filled or refills available) for medication(s) requested? Yes.     [facility] patient.

## 2022-02-25 NOTE — TELEPHONE ENCOUNTER
ESTABLISHED PATIENT PRE-VISIT PLANNING     Patient was NOT contacted to complete PVP.     Note: Patient will not be contacted if there is no indication to call.     1.  Reviewed notes from the last few office visits within the medical group: Yes    2.  If any orders were placed at last visit or intended to be done for this visit (i.e. 6 mos follow-up), do we have Results/Consult Notes?         •  Labs - Labs were not ordered at last office visit.  Note: If patient appointment is for lab review and patient did not complete labs, check with provider if OK to reschedule patient until labs completed.       •  Imaging - Imaging was not ordered at last office visit.       •  Referrals - No referrals were ordered at last office visit.    3. Is this appointment scheduled as a Hospital Follow-Up? No    4.  Immunizations were updated in Epic using Reconcile Outside Information activity? Yes    5.  Patient is due for the following Health Maintenance Topics:   Health Maintenance Due   Topic Date Due   • Annual Wellness Visit  Never done   • IMM ZOSTER VACCINES (2 of 2) 01/29/2019           6.  AHA (Pulse8) form printed for Provider? Yes

## 2022-02-25 NOTE — TELEPHONE ENCOUNTER
Vimpat 100mg Tablet    RTS - TRANS FEE = 0.00 EARLY FILL BY 20 DAYS (FILL ON 03/17/22) ; LAST FILLED 02/23/22 AT [facility] # [id number] (PH:[phone]) [id number] - 02/25/2022 8:02am

## 2022-02-26 NOTE — TELEPHONE ENCOUNTER
Received request via: Pharmacy    Was the patient seen in the last year in this department? Yes    Does the patient have an active prescription (recently filled or refills available) for medication(s) requested? Yes.  Not sure if she took all 90...

## 2022-03-01 NOTE — CASE COMMUNICATION
"patient requested agency discharge, states \"she is doing fine and don't need home health services\",  Brooke- please do office discharge per patients request.  Thank you"

## 2022-03-04 NOTE — PROGRESS NOTES
Subjective:     Chief Complaint   Patient presents with   • Anxiety         HPI:   Roxana presents today to follow up medication adjustments from last visit.     Zoloft and xanax:   Still not sleeping great. Stopped trazodone, waking early still in the AM. Still reporting anxiety all the time. Regular use of alprazolam in the day time.   Reports that she still not sleeping throughout the night.  She feels like her days and nights are somewhat mixed up.  She wakes early in the morning around 2 AM almost every morning.  She never feels rested and then this makes her anxiety much worse as well.  Reports that she does not feel like there is been any drop off with regard to taking her off the trazodone but she is glad she is not on it anymore because it was not doing anything.  She is tolerating increased dose of Zoloft well.  She continues to take her alprazolam 3 times daily but does again report that she continues to be anxious throughout the day and what feels like all the time.  She is somewhat concerned about her behaviors when she wakes up early in the morning because she does not necessarily remember them.  Daughter who is here today reports that she has a history of sleepwalking as well which complicates matters.  Roxana is not sure if she has been on anything else besides alprazolam for anxiety in the past.  She has tried Ambien and Lunesta in the past but she does not recall why these were stopped if they are beneficial or not in the past.  Her care and medication management are complicated by antiseizure medications as well as antirejection medications from transplant.  Months ago and there she is also on chronic opiate medications for pain.  This is somewhat complicating with interactions as well.      Current Outpatient Medications Ordered in Epic   Medication Sig Dispense Refill   • clonazePAM (KLONOPIN) 0.5 MG Tab Take 1 Tablet by mouth 3 times a day for 30 days. 90 Tablet 3   • amitriptyline (ELAVIL) 25  MG Tab Take 25 mg by mouth every evening.     • lacosamide (VIMPAT) 100 MG Tab tablet Take 1 Tablet by mouth 2 times a day for 30 days. 60 Tablet 0   • sertraline (ZOLOFT) 100 MG Tab Take 1.5 Tablets by mouth every day. 135 Tablet 3   • Home Care Oxygen Inhale 5 L/min continuous. Oxygen dose range: 5L/min  Respiratory route via: Nasal Cannula   Oxygen supplier: Preferred      Indications: shortness of breath     • Ascorbic Acid (VITAMIN C) 500 MG Chew Tab Chew 500 mg every day. Indications: Inadequate Vitamin C     • metoclopramide (REGLAN) 10 MG Tab Three times daily after meals (Patient taking differently: Three times daily after meals  Indications: Nausea and Vomiting) 60 Tablet 0   • ondansetron (ZOFRAN ODT) 4 MG TABLET DISPERSIBLE Take 1 Tablet by mouth every 6 hours as needed for Nausea. 10 Tablet 0   • levetiracetam (KEPPRA) 750 MG tablet TAKE 2 TABLETS BY ENTERAL TUBE ROUTE 2 TIMES A DAY FOR 30 DAYS. (Patient taking differently: Take 1,500 mg by mouth 2 times a day.) 120 Tablet 3   • fludrocortisone (FLORINEF) 0.1 MG Tab Take 0.1 mg by mouth every day. Indications: Blood Pressure Drop Upon Standing     • potassium chloride SA (KDUR) 20 MEQ Tab CR Take 1 Tablet by mouth every day. (Patient taking differently: Take 20 mEq by mouth every day. Indications: Low Amount of Potassium in the Blood) 90 Tablet 3   • albuterol (PROAIR HFA) 108 (90 Base) MCG/ACT Aero Soln inhalation aerosol Inhale 2 Puffs every four hours as needed for Shortness of Breath (wheezing). 8.5 g 3   • furosemide (LASIX) 20 MG Tab Take 1 Tablet by mouth every day. Indications: Edema 30 Tablet 0   • ambrisentan (LETAIRIS) 10 MG tablet TAKE 1 TABLET BY MOUTH EVERY DAY (Patient taking differently: Take 10 mg by mouth every day. Indications: Pulmonary Arterial Hypertension) 30 Tablet 11   • gabapentin (NEURONTIN) 100 MG Cap Take 1 Capsule by mouth 2 times a day. (Patient taking differently: Take 100 mg by mouth 2 times a day. Indications:  Neuropathic Pain) 180 Capsule 1   • levothyroxine (SYNTHROID) 137 MCG Tab Take 1 Tablet by mouth every morning on an empty stomach. (Patient taking differently: Take 137 mcg by mouth every morning on an empty stomach. Indications: Underactive Thyroid) 90 Tablet 1   • tacrolimus (PROGRAF) 1 MG Cap Take 2 mg by mouth 2 times a day. Indications: Liver Transplant Recipient     • tadalafil (CIALIS) 20 MG tablet Take 1 Tablet by mouth every day. (Patient taking differently: Take 20 mg by mouth every day. Indications: PULMONARY HTN) 90 Tablet 3   • omeprazole (PRILOSEC) 40 MG delayed-release capsule Take 1 Capsule by mouth every day. (Patient taking differently: Take 40 mg by mouth every day. Indications: Heartburn) 90 Capsule 3   • pravastatin (PRAVACHOL) 20 MG Tab Take 1 Tablet by mouth every day. (Patient taking differently: Take 20 mg by mouth every day. Indications: High Amount of Fats in the Blood) 90 Tablet 3   • mycophenolate (CELLCEPT) 250 MG Cap Take 250 mg by mouth 2 times a day. Indications: Liver Transplant Recipient     • oxyCODONE immediate release (ROXICODONE) 10 MG immediate release tablet Take 10 mg by mouth every 6 hours as needed for Moderate Pain. Take 1 tablet by mouth every 6-8 for moderate to severe pain     • therapeutic multivitamin-minerals (THERAGRAN-M) Tab Take 1 Tablet by mouth every evening. Indications: suppliment       No current Epic-ordered facility-administered medications on file.         ROS:  Gen: no fevers/chills, no changes in weight  Eyes: no changes in vision  ENT: no sore throat, no hearing loss, no bloody nose  Pulm: no sob, no cough  CV: no chest pain, no palpitations  GI: no nausea/vomiting, no diarrhea  : no dysuria  MSk: no myalgias  Skin: no rash  Neuro: no headaches, no numbness/tingling  Heme/Lymph: no easy bruising      Objective:     Exam:  /60 (BP Location: Left arm, Patient Position: Sitting, BP Cuff Size: Adult)   Pulse 90   Temp 36.4 °C (97.5 °F)   Resp  "12   Ht 1.753 m (5' 9\")   Wt 58.1 kg (128 lb)   LMP 01/03/2000   SpO2 97%   BMI 18.90 kg/m²  Body mass index is 18.9 kg/m².    Gen: AAOx3, NAD, well appearing  HEENT: NCAT, EOMI, Nares patent, Mucosa moist  Resp: Normal chest wall rise and fall, not SOB, no tachypnea, using supplemental oxygen via nasal cannula and oxygen concentrator.  Skin: no rash or abnormality of visible skin.   Psych: normal speech, not slurred, good insight, affect full  MSK: Moves all four limbs equally and normally, gait normal        Assessment & Plan:     62 y.o. female with the following -     .1. Anxiety  We had a long discussion regarding anxiety and the difficulty that insomnia plays with this.  I think that if we can get her on something a little bit longer lasting than the alprazolam then she might have better anxiety throughout the day and thus hopefully better sleep at night.  We did also discussed the importance of routinely getting outside in the sunlight for her day and night cycles as well.  This should help keep her circadian rhythms more normal.  She does have a lot of anxiety and oftentimes states she does not feel like going outside at all.  She also does have some fear about her neighbors and dogs outside as well.  We will try switching her from alprazolam to clonazepam.  We will also drop the dose just a little bit because this is a longer-lasting medication.  She will let us know how she is doing over the next 2 or 3 weeks and we can adjust as needed.  She may be a good candidate for mirtazapine for nighttime in the near future depending upon how she does for sleep.  - clonazePAM (KLONOPIN) 0.5 MG Tab; Take 1 Tablet by mouth 3 times a day for 30 days.  Dispense: 90 Tablet; Refill: 3    2. Primary insomnia    - clonazePAM (KLONOPIN) 0.5 MG Tab; Take 1 Tablet by mouth 3 times a day for 30 days.  Dispense: 90 Tablet; Refill: 3            Return in about 3 weeks (around 3/25/2022).    Please note that this dictation " was created using voice recognition software. I have made every reasonable attempt to correct obvious errors, but I expect that there are errors of grammar and possibly content that I did not discover before finalizing the note.

## 2022-03-05 NOTE — TELEPHONE ENCOUNTER
Please call [facility] on [address] and ask about refill sent in for usama for Relistor. This was sent in last month and we havent had any paperwork or prior auth info from pharmacy.       Thank you,   Halley Levine MD

## 2022-03-07 NOTE — CASE COMMUNICATION
"FYI -Missed SNV, Patient requested agency discharge effective 3/1/22, states \"I don't need home health services\""

## 2022-03-09 PROBLEM — G43.719 CHRONIC MIGRAINE WITHOUT AURA, INTRACTABLE, WITHOUT STATUS MIGRAINOSUS: Status: ACTIVE | Noted: 2022-01-01

## 2022-03-09 NOTE — PROGRESS NOTES
Subjective     Roxana Cuba is a 62 y.o. female who presents with her  Otis, for follow-up, originally seen in the clinic by Dr. Maxwell Stone MD, with a history of symptomatic seizures related to a traumatic subdural hematoma and subarachnoid hemorrhage on August 3, 2021, but who has had persistent problems with headaches and cognitive impairment in addition.  History is gotten from discussions with the patient, her , as well as review of the electronic health record.     HPI    Seizures: Since last seen, Roxana had seen Dr. Stone, reviewing the records, she had suffered from what sounds like were several seizures following surgical decompression of the hemorrhagic lesions.  Patient had awakened postoperatively doing well, though complaining of some speech difficulties and headache, with associated right-sided weakness.  She had become suddenly confused, language distorted, with subsequent seizure activity.  Placed on Dilantin, given her multifactoral liver disease, it was recommended that Keppra be started, initially 500 mg, twice daily, neurology was consulted, Vimpat 100 mg twice daily was recommended as an addition.  Keppra was then increased to a 1500 mg, twice daily regimen, though it is not clear whether this was due to seizure recurrence.  Regardless, she remained on Vimpat and a higher dose of Keppra throughout her hospitalization.  EEG was not recommended simply because it would not change treatment recommendations at the time.  MRI from August 3, 2021, revealed, surprisingly, very little intraparenchymal insult either chronic or acute underlying the subdural hematoma and subarachnoid hemorrhage, as well as in a more nonspecific generalized pattern involving the cerebral white matter and cortex.    Cognitive impairment: Prior to the patient's traumatic insult, surgery, etc., she had had no complaints of cognitive and memory loss.  Following discharge, she still did have some word  finding difficulties, but also noted a superimposed problem with short-term memory and recall.  She required frequent reminders.  She writes things down otherwise she would forget them.  Mental processing speed is slower.  Multitasking has become nearly impossible.  She can lose her train of thought mid sentence, has difficulty getting back on track whether with help or spontaneously.  At home she has forgotten recipes in their entirety, but if she recalls these routine tasks, she has difficulty following them through sequentially.  She can focus even with environmental distractions.    The language difficulties remain mostly of a word finding issue.  She can keep pace in conversation immediately, though she has difficulty recalling details.  She is independent with her ADLs.  Balance is curtailed as a complication of the medical problems and the mild right hemiparesis.  There has been no major change with bowel or bladder functions from their premorbid state.    Left frontal subdural and subarachnoid hemorrhage: In addition, she still notes the right-sided weakness and clumsiness with the hand, the right leg seems to move more slowly, she uses an assistive device more often.  She denies any right-sided visual distortion, or sensory loss.  Follow-up CT scan from February 5, 2022, revealed no new hemorrhagic or ischemic insults.  There is a minimal postoperative change only over the left frontal lobe, also bilateral frontal hygromas with no mass-effect and ventriculomegaly ex vacuo bilaterally.    Headaches: She began to suffer from headaches within a matter of days after she awoke from surgery.  Review of the records indicates she was complaining of headaches when she was seen by neurology 2 days after her surgery.  She describes the headaches as daily, mild and bilateral over the forehead.  She wakes up with them and goes to sleep with them.  As they worsen, they moved to the left side of the head, they throb,  there can be associated nausea, heightened sensitivity to light, and concentration is clearly curtailed.    This increase typically happens as the day rolls on.  She denies any autonomic symptoms, allodynia or neck symptoms.  There is no increase in her language difficulties or right-sided motor deficits, the pain severely curtailed her cognitive functions.    When things started, the severe flareups were more episodic, now they occur on a daily basis.  She uses over-the-counter Tylenol, despite recommendations from her hepatologist to the contrary.  She has not been given specific, headache related medication treatments.  She has been on gabapentin 600 mg, twice a day, prior to her admission and surgery last year, on her own but they have been cutting it down, she is now 100 mg, twice daily, without change in the headaches.  They are asking if they can discontinue the medicine completely.    Medical, surgical and family histories are reviewed, there are no new drug allergies.  She has no history of seizure, stroke, migraine, CAD, renal disease, PVD, neurodegenerative disease or MS.  The surgical history is as above, status post liver transplant on chronic immunosuppressive therapy.  She has been discharged from the care of Dr. Arnold, the neurosurgeon.    No one in her family has a history of migraine headaches or seizures.  She does not smoke or drink.    Her list of medications is long, from my standpoint including Vimpat 100 mg twice daily, Keppra 1500 mg, twice daily, Neurontin 100 mg, twice daily, along with Elavil, Florinef 0.1 mg daily, CellCept, Prograf, Synthroid, Reglan, Pravachol, Lasix, Cialis, Letairis, Zoloft, Klonopin, and Prilosec.    Review of Systems   Neurological: Positive for headaches. Negative for tingling, seizures and loss of consciousness.   Psychiatric/Behavioral: Positive for memory loss.   All other systems reviewed and are negative.    Objective     /62 (BP Location: Right arm,  "Patient Position: Sitting, BP Cuff Size: Adult)   Pulse 82   Temp 37 °C (98.6 °F) (Temporal)   Ht 1.753 m (5' 9\")   Wt 58 kg (127 lb 13.9 oz)   LMP 01/03/2000   SpO2 99%   BMI 18.88 kg/m²      Physical Exam    She appears in no acute distress.  Vital signs are stable.  There is no malar rash or jaw claudication.  The neck is supple.  Cardiac evaluation reveals a regular rhythm.  There is some mild bilateral lower extremity edema.    Neurological Exam    She is fully oriented, her mental status examination reveals a slight slowing of processing speed, there is no aphasia, apraxia, or inattention.  More in-depth assessment was not done.    PERRLA/EOMI, finger counting on confrontation revealed no visual field deficit OU, facial movements are symmetric though the right nasolabial fold is slightly diminished at rest.  There is no bulbar dysfunction, the tongue and uvula are midline.  Sensory exam does reveal a slight subjective decrement of pin and temperature over the right side of the face when compared to the left.  There is no extinction.  Shoulder shrug and head rotation are symmetric.    Musculoskeletal exam reveals normal tone, there is no tremor, asterixis, or drift.  There is a slight weakness with the right upper and lower extremity only.  Strength and tone are intact on the left.    Reflexes show absent ankle jerks bilaterally, but knee jerk, biceps and triceps are slightly brisker on the right when compared to the left.  There is a slight degree of spread on the right which is not seen on the left at the knee, pectoralis is brisker on the right than on the left.  The right toe is equivocal, the left downgoing on plantar stimulation.    Repetitive movements are slowed with the right hand and foot when compared to the left.  Movements of the right upper extremity are slowed though there is no appendicular dystaxia on either side in the upper extremities.  As she walks there is slight circumduction with " the right leg only.    Sensory exam reveals a subjective decrement of temperature and pinprick on the right side of the body including arm and leg when compared to the left.  Vibration is intact throughout.    Assessment & Plan     1. Seizures (HCC)  Likely symptomatic issue related to the old cerebral insults, she has been stable on Keppra and Vimpat, and though there is structural pathology that increases the risk of recurrence, and EEG should be done and then further discussion about discontinuing medication slowly.  She understands the risks, she is not overly anxious about getting off drug anytime soon.  They will notify the office for any breakthrough seizure events.  In the meantime Vimpat and Keppra will be continued unchanged.    - Referral to Neurodiagnostics (EEG,EP,EMG/NCS/DBS)  - lacosamide (VIMPAT) 100 MG Tab tablet; Take 1 Tablet by mouth 2 times a day for 30 days.  Dispense: 60 Tablet; Refill: 0  - levetiracetam (KEPPRA) 750 MG tablet; Take 2 Tablets by mouth 2 times a day.  Dispense: 120 Tablet; Refill: 5    2. Traumatic subdural hematoma without loss of consciousness, initial encounter ([facility]), Cognitive impairment  A lot of the residual cognitive symptoms that she has probably are related to the direct effect of the traumatic event, especially given that she has had no history of progressive cognitive disorder prior to the events in question.  She describes right-sided motor deficits, but also language difficulties, attention impairment, as well as frontal lobe impairment with complex attention and memory with learning impairment, sequencing difficulties with routine events and tasks, etc.  Neuropsychological testing should be done to better characterize his deficits that she has and if possible to help determine if there is something else other than a posttraumatic, static process.  Regardless, unfortunately, there is no direct treatment that can be offered, though occupational and speech therapies  might prove beneficial if the deficits found would be amenable to treatment.  She is already completed physical therapy for the motor deficits, something she understands will be permanent.  I discussed the case with our neuropsychologist, Dr. Leighton Chopra, PhD.  Depending on his findings, we may need to consider MRI imaging again.    - Referral for Psychological Testing    3. Chronic migraine without aura, intractable, without status migrainosus  The headaches clearly have migrainous features to them, but is unusual given the late onset, and absent family history, but she did suffer from rather severe trauma and intraparenchymal abnormalities or hemorrhagic nature, and thus a posttraumatic migrainous disorder is most likely at play.  Usually a self-limited condition, headaches may take a while to resolve though eventually they do in most patients.  Given her complicated history, we need to decide on medications that can be taken daily to prevent the headaches from happening, she fits criteria for chronic migraine, so we are dealing with Botox, CGRP medicines and Topamax.  We have other options, but there are still issues with side effects.  Gabapentin has provided no benefit whatsoever, I simply recommended that he stop the drug entirely.    Emgality, a CGRP maintenance therapy will be used, starting with a bolus of 240 mg subcu, and then 120 mg every 4 weeks.  Side effects were reviewed.  We will follow along.    - Galcanezumab-gnlm (EMGALITY) 120 MG/ML Solution Auto-injector; Inject 1 PEN under the skin every 4 weeks.  Dispense: 1 Each; Refill: 5  - Galcanezumab-gnlm (EMGALITY) 120 MG/ML Solution Auto-injector; Inject 2 Syringes under the skin one time for 1 dose.  Dispense: 2 Each; Refill: 0    Time: 40 minutes in total spent on patient care including precharting, record review, discussions with healthcare staff and documentation.  This includes face-to-face time with the patient for exam, review, discussion, as  well as counseling and coordinating care.

## 2022-03-10 NOTE — TELEPHONE ENCOUNTER
Received request via: Pharmacy    Was the patient seen in the last year in this department? Yes  3/4/2022  Does the patient have an active prescription (recently filled or refills available) for medication(s) requested? No

## 2022-03-10 NOTE — TELEPHONE ENCOUNTER
Vimpat 100mg Tablet    RTS - EARLY FILL BY 7 DAYS (FILL ON 03/17/22);  FOR SIG/DOSING CHANGES SUBMIT '05' IN D ENIAL OVERRIDE/SUBMISSION CLARIFICATION;  LAST FILLED 02/23/22 AT [facility] #[id number] (PH:[phone]) [id number] - 03/10/2022 12:09pm    Emgality 120mg/ml SOAJ    Request rec'd via [facility], ran TC via [place], TC paid copay $317.02 #2ml/30 DS notifying liaison Silvia Treviño. - 03/10/2022 12:15pm

## 2022-03-13 NOTE — PROGRESS NOTES
"Roxana Cuba is a 62 y.o. female who presents for Hand Injury (X2 days, left hand swollen, bruised, painful )    Accompanied by her .   HPI When she was sleeping she hit her hand on the headboard of her bed. She had immediate pain. She sometimes thrashes aroud at night.  Since then she has been having increased pain and swelling in left hand. Hurts if she moves it or puts pressure on it.   Pain 9/10.  Treatment tried: oxycodone. No other aggravating or alleviating factors.       Review of Systems   Constitutional: Negative for chills, fever and malaise/fatigue.   Respiratory: Negative for cough.    Cardiovascular: Negative for chest pain.   Gastrointestinal: Negative for nausea.   Neurological: Negative for headaches.   Endo/Heme/Allergies: Negative for environmental allergies.   Psychiatric/Behavioral: Negative for substance abuse.       Allergies:       Allergies   Allergen Reactions   • Nsaids Unspecified     Can not take due to hx of liver transplant    • Rhubarb Anaphylaxis   • Organic Nitrates Unspecified     Nitroglycerin products should be avoided with the use of PDE-5 inhibitors as the combination can result in severe hypotension.  RD clarified with pt: Nitrates in food are okay and pt does not want nitrates to be listed as food allergy. Pt only avoids medications with nitrates.    • Other Drug      Any medication that may interact with cyclosporine, tacrolimus, sirolimus, or prograf (due to hx of liver transplant)        PMSFS Hx:  Past Medical History:   Diagnosis Date   • Anemia    • Anesthesia     \"takes more to get me under, would rather be intubated\"    • Breath shortness     cont oxygen 5L (Preffered)   • Bronchitis      2016   • Cardiomegaly    • Chronic fatigue and malaise    • Chronic obstructive pulmonary disease (HCC)    • Cirrhosis (HCC) December 2011    [place] [facility] [place]   • CKD (chronic kidney disease) stage 3, GFR 30-59 ml/min (HCC)    • Diabetes (HCC)     " "reports hx of, resolved w/weight loss. Reports still checking bloodsugars twice daily and using insulin PRN   • Fracture of left foot    • GERD (gastroesophageal reflux disease)         • H/O Clostridium difficile infection 02-17-17    reports \"16 months ago\". Current stool sample 01-26-17 neg   • Hemorrhagic disorder (HCC)     \"low platelets\" bruises easily    • Hiatus hernia syndrome    • High cholesterol    • Hypothyroid    • Jaundice 2009   • Liver transplanted (HCC)    • Low back pain 02-17-17    and left foot, 7/10   • Mild aortic regurgitation and aortic valve sclerosis     • On home oxygen therapy     5 liters, Dr. Gaming   • Platelet disorder (HCC)     low platelets   • Pneumonia     aspiration,    • Psychiatric disorder     Mood disorder   • Pulmonary hypertension (HCC)        • S/P cholecystectomy    • Small bowel obstruction (HCC)     01-   • Splenomegaly    • VRE (vancomycin-resistant Enterococci)     02-17-17, pt declines knowledge of this     Past Surgical History:   Procedure Laterality Date   • MA UPPER GI ENDOSCOPY,DIAGNOSIS N/A 2/3/2022    Procedure: GASTROSCOPY;  Surgeon: Aravind Richards M.D.;  Location: SURGERY SAME DAY [facility];  Service: Gastroenterology   • CRANIOTOMY Left 8/4/2021    Procedure: CRANIOTOMY;  Surgeon: Tramaine Arnold III, M.D.;  Location: SURGERY [facility];  Service: Neurosurgery   • VENTRAL HERNIA REPAIR ROBOTIC XI N/A 11/12/2018    Procedure: VENTRAL HERNIA REPAIR ROBOTIC XI- FOR EPIGASTRIC INCISIONAL;  Surgeon: John H Ganser, M.D.;  Location: SURGERY [facility];  Service: General   • TURBINATE REDUCTION Bilateral 10/4/2018    Procedure: TURBINATE REDUCTION;  Surgeon: Silviano Erazo M.D.;  Location: SURGERY SAME DAY [facility];  Service: Ent   • NASAL RECONSTRUCTION Bilateral 10/4/2018    Procedure: NASAL RECONSTRUCTION- LATERA IMPLANTS;  Surgeon: Silviano Erazo M.D.;  Location: SURGERY SAME DAY [facility];  Service: Ent   • OTHER  12/11/2017    " paniculectomy   • COLONOSCOPY N/A 3/27/2017    Procedure: COLONOSCOPY;  Surgeon: Osman Matt M.D.;  Location: SURGERY SAME DAY [facility];  Service:    • GASTROSCOPY N/A 3/27/2017    Procedure: GASTROSCOPY;  Surgeon: Osman Matt M.D.;  Location: SURGERY SAME DAY [facility];  Service:    • BREAST BIOPSY Left 2/21/2017    Procedure: BREAST BIOPSY - WIRE LOCALIZED EXCISONAL ;  Surgeon: Jane Zhao M.D.;  Location: SURGERY SAME DAY [facility];  Service:    • LUNG BIOPSY OPEN  11/2016   • OTHER ABDOMINAL SURGERY      Gasric Sleeve   • BONE MARROW ASPIRATION  3/16/2015    Performed by Marlena Hi M.D. at ENDOSCOPY [facility]   • BONE MARROW BIOPSY, NDL/TROCAR  3/16/2015    Performed by Marlena Hi M.D. at ENDOSCOPY [facility]   • RECOVERY  3/31/2014    Performed by Ir[facility]Recovery Surgery at SURGERY [facility]   • RECOVERY  3/24/2014    Performed by Cath-Recovery Surgery at SURGERY SAME DAY [facility]   • RECOVERY  1/21/2014    Performed by Ir[facility]Recovery Surgery at SURGERY SAME DAY [facility]   • BRONCHOSCOPY-ENDO  11/15/2013    Performed by Ha Perez M.D. at ENDOSCOPY [facility]   • RECOVERY  2/27/2013    Performed by [facility] Surgery at SURGERY SAME DAY [facility]   • BONE MARROW ASPIRATION  12/31/2012    Performed by Josemanuel Real M.D. at ENDOSCOPY [facility]   • BONE MARROW BIOPSY, NDL/TROCAR  12/31/2012    Performed by Josemanuel Real M.D. at ENDOSCOPY [facility]   • GASTROSCOPY  9/28/2012    Performed by Aravind Richards M.D. at SURGERY [facility]   • SIGMOIDOSCOPY FLEX  9/26/2012    Performed by Kristopher Cardoso M.D. at ENDOSCOPY [facility]   • BRONCHOSCOPY-ENDO  6/19/2012    Performed by MARLENA MURILLO at [facility]   • BRONCHOSCOPY-ENDO  5/29/2012    Performed by SUYAPA CAMARENA at ENDOSCOPY [facility]   • OTHER ABDOMINAL SURGERY  December 2011    Liver transplant at [facility] by Dr. Canada.   •  GASTROSCOPY-ENDO  3/12/2010    Performed by CAMELIA SAMANO at ENDOSCOPY [facility]   • EXAM UNDER ANESTHESIA  11/11/2009    Performed by BIRD ABDI at SURGERY [facility]   • HEMORRHOIDECTOMY  11/11/2009    Performed by BIRD ABDI at SURGERY [facility]   • KELBY BY LAPAROSCOPY  9/19/2009    Performed by BIRD ABDI at SURGERY [facility]   • CARPAL TUNNEL RELEASE          • KELBY BY LAPAROSCOPY     • GASTRIC BYPASS LAPAROSCOPIC     • HERNIA REPAIR      x3   • HYSTERECTOMY, TOTAL ABDOMINAL          • OTHER      Breast reduction   • OTHER      liver transplant   • FL ANESTH,NOSE,SINUS SURGERY      x4   • TONSILLECTOMY       Family History   Problem Relation Age of Onset   • Other Father         Unknown (dead 10 years)   • Diabetes Father    • Heart Disease Father    • Hypertension Father    • Hyperlipidemia Father    • Stroke Father    • Non-contributory Mother    • Hyperlipidemia Mother    • Alcohol/Drug Mother    • Cancer Paternal Aunt    • Alcohol/Drug Maternal Grandmother    • Alcohol/Drug Maternal Grandfather      Social History     Tobacco Use   • Smoking status: Never Smoker   • Smokeless tobacco: Never Used   • Tobacco comment: avoid all tobacco products   Substance Use Topics   • Alcohol use: Not Currently     Alcohol/week: 0.0 oz       Problems:   Patient Active Problem List   Diagnosis   • Status post liver transplant Dr. Canada ([facility])   • Acquired hypothyroidism   • History of pancreatitis   • H/O non-ST elevation myocardial infarction (NSTEMI)   • DUC (generalized anxiety disorder)   • Primary insomnia   • Sarcoidosis (HCC)   • Interstitial lung disease (HCC)   • MGUS (monoclonal gammopathy of unknown significance)   • Hepatopulmonary syndrome (HCC)   • Ostium secundum type atrial septal defect, S/P percutaneous closure   • Mild aortic regurgitation and aortic valve sclerosis   • Vitamin D deficiency   • Chronic respiratory failure (HCC)   • Pure hypercholesterolemia    • Mild mitral regurgitation   • Splenomegaly   • Immunocompromised state ([facility])   • Splenic lesion   • Chronic pain syndrome   • Other allergic rhinitis   • GERD (gastroesophageal reflux disease)   • Hypokalemia   • Chronic prescription benzodiazepine use   • Recurrent major depressive disorder, in remission ([facility])   • History of small bowel obstruction   • History of Clostridium difficile infection   • Steroid-induced hyperglycemia   • S/P bariatric surgery   • Slow transit constipation   • Chronic nausea   • History of aspiration pneumonia   • Hiatal hernia   • High risk medication use   • Mixed obsessional thoughts and acts   • Primary pulmonary hypertension ([facility])   • Incisional hernia, without obstruction or gangrene   • History of infection with vancomycin resistant Enterococcus (VRE)   • Adrenal insufficiency ([facility])   • Abdominal pain   • Pancytopenia ([facility])   • Cold sore   • SDH (subdural hematoma) ([facility])   • Hypernatremia   • History of pneumonia   • Seizures ([facility])   • Closed fracture of right elbow with routine healing   • Hypomagnesemia   • Hypocalcemia   • Campylobacter gastroenteritis   • Debility   • Dizziness   • Volume overload   • Cellulitis of right lower extremity   • Moderate protein-calorie malnutrition ([facility])   • Acute colitis   • Chronic migraine without aura, intractable, without status migrainosus       Medications:   Current Outpatient Medications on File Prior to Visit   Medication Sig Dispense Refill   • levothyroxine (SYNTHROID) 137 MCG Tab Take 1 Tablet by mouth every morning on an empty stomach. Indications: Underactive Thyroid 90 Tablet 3   • lacosamide (VIMPAT) 100 MG Tab tablet Take 1 Tablet by mouth 2 times a day for 30 days. 60 Tablet 0   • levetiracetam (KEPPRA) 750 MG tablet Take 2 Tablets by mouth 2 times a day. 120 Tablet 5   • Galcanezumab-gnlm (EMGALITY) 120 MG/ML Solution Auto-injector Inject 1 PEN under the skin every 4 weeks. 1 Each 5   • clonazePAM (KLONOPIN) 0.5 MG Tab Take  1 Tablet by mouth 3 times a day for 30 days. 90 Tablet 3   • amitriptyline (ELAVIL) 25 MG Tab Take 25 mg by mouth every evening.     • sertraline (ZOLOFT) 100 MG Tab Take 1.5 Tablets by mouth every day. 135 Tablet 3   • Home Care Oxygen Inhale 5 L/min continuous. Oxygen dose range: 5L/min  Respiratory route via: Nasal Cannula   Oxygen supplier: Preferred      Indications: shortness of breath     • Ascorbic Acid (VITAMIN C) 500 MG Chew Tab Chew 500 mg every day. Indications: Inadequate Vitamin C     • metoclopramide (REGLAN) 10 MG Tab Three times daily after meals (Patient taking differently: Three times daily after meals  Indications: Nausea and Vomiting) 60 Tablet 0   • ondansetron (ZOFRAN ODT) 4 MG TABLET DISPERSIBLE Take 1 Tablet by mouth every 6 hours as needed for Nausea. 10 Tablet 0   • fludrocortisone (FLORINEF) 0.1 MG Tab Take 0.1 mg by mouth every day. Indications: Blood Pressure Drop Upon Standing     • potassium chloride SA (KDUR) 20 MEQ Tab CR Take 1 Tablet by mouth every day. (Patient taking differently: Take 20 mEq by mouth every day. Indications: Low Amount of Potassium in the Blood) 90 Tablet 3   • albuterol (PROAIR HFA) 108 (90 Base) MCG/ACT Aero Soln inhalation aerosol Inhale 2 Puffs every four hours as needed for Shortness of Breath (wheezing). 8.5 g 3   • furosemide (LASIX) 20 MG Tab Take 1 Tablet by mouth every day. Indications: Edema 30 Tablet 0   • ambrisentan (LETAIRIS) 10 MG tablet TAKE 1 TABLET BY MOUTH EVERY DAY (Patient taking differently: Take 10 mg by mouth every day. Indications: Pulmonary Arterial Hypertension) 30 Tablet 11   • gabapentin (NEURONTIN) 100 MG Cap Take 1 Capsule by mouth 2 times a day. (Patient taking differently: Take 100 mg by mouth 2 times a day. Indications: Neuropathic Pain) 180 Capsule 1   • tacrolimus (PROGRAF) 1 MG Cap Take 2 mg by mouth 2 times a day. Indications: Liver Transplant Recipient     • tadalafil (CIALIS) 20 MG tablet Take 1 Tablet by mouth every day.  "(Patient taking differently: Take 20 mg by mouth every day. Indications: PULMONARY HTN) 90 Tablet 3   • omeprazole (PRILOSEC) 40 MG delayed-release capsule Take 1 Capsule by mouth every day. (Patient taking differently: Take 40 mg by mouth every day. Indications: Heartburn) 90 Capsule 3   • pravastatin (PRAVACHOL) 20 MG Tab Take 1 Tablet by mouth every day. (Patient taking differently: Take 20 mg by mouth every day. Indications: High Amount of Fats in the Blood) 90 Tablet 3   • mycophenolate (CELLCEPT) 250 MG Cap Take 250 mg by mouth 2 times a day. Indications: Liver Transplant Recipient     • oxyCODONE immediate release (ROXICODONE) 10 MG immediate release tablet Take 10 mg by mouth every 6 hours as needed for Moderate Pain. Take 1 tablet by mouth every 6-8 for moderate to severe pain     • therapeutic multivitamin-minerals (THERAGRAN-M) Tab Take 1 Tablet by mouth every evening. Indications: suppliment       No current facility-administered medications on file prior to visit.          Objective:     /78   Pulse 80   Temp 36.8 °C (98.3 °F) (Temporal)   Resp 16   Ht 1.753 m (5' 9\")   Wt 58.1 kg (128 lb)   LMP 01/03/2000   SpO2 97% Comment: on oxygen 4LT  per minute  BMI 18.90 kg/m²     Physical Exam  Vitals and nursing note reviewed.   Constitutional:       General: She is not in acute distress.     Appearance: She is well-developed. She is not toxic-appearing.   HENT:      Head: Normocephalic.   Cardiovascular:      Rate and Rhythm: Normal rate and regular rhythm.      Pulses: Normal pulses.      Heart sounds: Normal heart sounds.   Pulmonary:      Effort: Pulmonary effort is normal.      Breath sounds: Normal breath sounds.   Musculoskeletal:      Left forearm: Normal. No swelling, edema, deformity, lacerations, tenderness or bony tenderness.      Left wrist: Tenderness present. No swelling, deformity, effusion, lacerations, bony tenderness, snuff box tenderness or crepitus. Normal range of motion. " Normal pulse.      Left hand: Swelling, tenderness and bony tenderness present. Decreased range of motion. Normal strength. Normal sensation. Normal pulse.      Comments: + ecchymosis left hand/ palm.    Skin:     General: Skin is warm and dry.      Capillary Refill: Capillary refill takes less than 2 seconds.   Neurological:      Mental Status: She is alert and oriented to person, place, and time.   Psychiatric:         Mood and Affect: Mood normal.         Behavior: Behavior normal. Behavior is cooperative.         Thought Content: Thought content normal.       RADIOLOGY RESULTS   DX-HAND 3+ LEFT    Result Date: 3/13/2022  3/13/2022 2:22 PM HISTORY/REASON FOR EXAM:  Pain/Deformity Following Trauma; hand injury 2 days ago Left hand pain and bruising TECHNIQUE/EXAM DESCRIPTION AND NUMBER OF VIEWS: 3 views of the LEFT hand. COMPARISON:  None FINDINGS:  No acute fracture, malalignment, or soft tissue edema.     No acute fractures identified         Xray: Reviewed and interpreted independently by me. I agree with the radiologist's findings.       Assessment /Associated Orders:      1. Contusion of right hand, initial encounter     2. Injury of left hand, initial encounter  DX-HAND 3+ LEFT         Medical Decision Making:    Pt is clinically stable at today's acute urgent care visit.  No acute distress noted. Appropriate for outpatient management at this time.   Acute problem today with uncertain prognosis.     ACE wrap prn discomfort  Ice   Rest   Elevation     Advised to follow-up with the primary care provider for recheck, reevaluation, and consideration of further management if necessary.   Educated in Red flags and indications to immediately call 911 or present to the Emergency Department.     I personally reviewed prior external notes and test results pertinent to today's visit.  I have independently reviewed and interpreted all diagnostics ordered during this urgent care acute visit.   Time spent evaluating this  patient was at least 30 minutes and includes preparing for visit, counseling/education, exam and evaluation, obtaining history, independent interpretation, ordering lab/test/procedures,medication management and documentation.Time does not include separately billable procedures noted .

## 2022-03-17 NOTE — TELEPHONE ENCOUNTER
Received request via: Pharmacy  3/4/2022lov  Was the patient seen in the last year in this department? Yes    Does the patient have an active prescription (recently filled or refills available) for medication(s) requested? No

## 2022-03-19 PROBLEM — E87.20 LACTIC ACIDOSIS: Status: ACTIVE | Noted: 2022-01-01

## 2022-03-19 PROBLEM — S42.209A CLOSED FRACTURE OF PROXIMAL END OF HUMERUS: Status: ACTIVE | Noted: 2022-01-01

## 2022-03-19 PROBLEM — R55 SYNCOPE: Status: ACTIVE | Noted: 2022-01-01

## 2022-03-19 PROBLEM — D64.9 ANEMIA: Status: ACTIVE | Noted: 2022-01-01

## 2022-03-19 NOTE — ED PROVIDER NOTES
"ED Provider Note  CHIEF COMPLAINT  Chief Complaint   Patient presents with   • GLF     Fell out of bed in her sleep. Right arm pain. Neck pain. Headache.   • Head Injury     C/o headache since falling out of bed. States she had a craniotomy in August due to a subdural hematoma that resulted from a head injury.   • Weight Loss     Also reports weight loss of 44lb over the last 2 months.   • Vision Change     \"my vision is all messed up\" for the last 3 months.   • Fatigue     Worsening over months.       HPI  Roxana Cuba is a 62 y.o. female who presents after falling out of bed this morning.  Patient reports having a headache and right-sided neck pain and right arm pain after the fall.  Patient has a history of a craniotomy in August due to a subdural hematoma.  She is concerned about reinjuring her head.  She has had a persistent headache since the fall.  She is not on anticoagulation.  She also complains of right-sided neck pain and some swelling that extends to her right shoulder.  She is bruised to her right arm elbow and wrist area.  She denies any numbness or tingling in her arm.  She also states that she just has not felt right since her surgery.  She has had weight loss.  Her visions had some chronic changes.  She has had worsening fatigue.  She does overall does not feel well.  Past medical history significant for a liver transplant.  She has followed up with her regular doctors who state her brain fog and fatigue are likely secondary to her brain surgery.  She denies any chest pain or shortness of breath.  No abdominal pain.  No nausea or vomiting.  No new numbness or weakness in her arms or legs.    REVIEW OF SYSTEMS  See HPI for further details.  Positive for headache, neck pain, right arm pain, fatigue, brain fog, weight loss.  Negative for fever, chest pain, shortness of breath, she is on her normal oxygen of 5 L, no abdominal pain, new low back pain, numbness or weakness in her arms or legs, " "dysuria. all other systems are negative.     PAST MEDICAL HISTORY  Past Medical History:   Diagnosis Date   • Low back pain 02-17-17    and left foot, 7/10   • H/O Clostridium difficile infection 02-17-17    reports \"16 months ago\". Current stool sample 01-26-17 neg   • Cirrhosis (HCC) December 2011    Status post liver transplant at [facility]   • Jaundice 2009   • Anemia    • Anesthesia     \"takes more to get me under, would rather be intubated\"    • Breath shortness     cont oxygen 5L (Preffered)   • Bronchitis      2016   • Cardiomegaly    • Chronic fatigue and malaise    • Chronic obstructive pulmonary disease (HCC)    • CKD (chronic kidney disease) stage 3, GFR 30-59 ml/min ([facility])    • Diabetes ([facility])     reports hx of, resolved w/weight loss. Reports still checking bloodsugars twice daily and using insulin PRN   • Fracture of left foot    • GERD (gastroesophageal reflux disease)         • Hemorrhagic disorder ([facility])     \"low platelets\" bruises easily    • Hiatus hernia syndrome    • High cholesterol    • Hypothyroid    • Liver transplanted ([facility])    • Mild aortic regurgitation and aortic valve sclerosis     • On home oxygen therapy     5 liters, Dr. Gaming   • Platelet disorder ([facility])     low platelets   • Pneumonia     aspiration,    • Psychiatric disorder     Mood disorder   • Pulmonary hypertension ([facility])        • S/P cholecystectomy    • Small bowel obstruction ([facility])     01-   • Splenomegaly    • VRE (vancomycin-resistant Enterococci)     02-17-17, pt declines knowledge of this       FAMILY HISTORY  [unfilled]    SOCIAL HISTORY  Social History     Socioeconomic History   • Marital status:    Tobacco Use   • Smoking status: Never Smoker   • Smokeless tobacco: Never Used   • Tobacco comment: avoid all tobacco products   Vaping Use   • Vaping Use: Never used   Substance and Sexual Activity   • Alcohol use: Not Currently     Alcohol/week: 0.0 oz   • Drug use: No       SURGICAL HISTORY  Past " Surgical History:   Procedure Laterality Date   • AL UPPER GI ENDOSCOPY,DIAGNOSIS N/A 2/3/2022    Procedure: GASTROSCOPY;  Surgeon: Aravind Rcihards M.D.;  Location: SURGERY SAME DAY [facility];  Service: Gastroenterology   • CRANIOTOMY Left 8/4/2021    Procedure: CRANIOTOMY;  Surgeon: Tramaine Arnold III, M.D.;  Location: SURGERY [facility];  Service: Neurosurgery   • VENTRAL HERNIA REPAIR ROBOTIC XI N/A 11/12/2018    Procedure: VENTRAL HERNIA REPAIR ROBOTIC XI- FOR EPIGASTRIC INCISIONAL;  Surgeon: John H Ganser, M.D.;  Location: SURGERY [facility];  Service: General   • TURBINATE REDUCTION Bilateral 10/4/2018    Procedure: TURBINATE REDUCTION;  Surgeon: Silviano Erazo M.D.;  Location: SURGERY SAME DAY [facility];  Service: Ent   • NASAL RECONSTRUCTION Bilateral 10/4/2018    Procedure: NASAL RECONSTRUCTION- LATERA IMPLANTS;  Surgeon: Silviano Erazo M.D.;  Location: SURGERY SAME DAY [facility];  Service: Ent   • OTHER  12/11/2017    paniculectomy   • COLONOSCOPY N/A 3/27/2017    Procedure: COLONOSCOPY;  Surgeon: Osman Matt M.D.;  Location: SURGERY SAME DAY [facility];  Service:    • GASTROSCOPY N/A 3/27/2017    Procedure: GASTROSCOPY;  Surgeon: Osman Matt M.D.;  Location: SURGERY SAME DAY [facility];  Service:    • BREAST BIOPSY Left 2/21/2017    Procedure: BREAST BIOPSY - WIRE LOCALIZED EXCISONAL ;  Surgeon: Jane Zhao M.D.;  Location: SURGERY SAME DAY [facility];  Service:    • LUNG BIOPSY OPEN  11/2016   • OTHER ABDOMINAL SURGERY      Gasric Sleeve   • BONE MARROW ASPIRATION  3/16/2015    Performed by Freddie Hi M.D. at ENDOSCOPY [facility]   • BONE MARROW BIOPSY, NDL/TROCAR  3/16/2015    Performed by Freddie Hi M.D. at ENDOSCOPY [facility]   • RECOVERY  3/31/2014    Performed by [facility] at [facility]   • RECOVERY  3/24/2014    Performed by [facility] at [facility] SAME DAY [facility] ORS   • RECOVERY  1/21/2014    Performed by  [facility] at SURGERY SAME DAY [facility] ORS   • BRONCHOSCOPY-ENDO  11/15/2013    Performed by Ha Perez M.D. at ENDOSCOPY [facility] ORS   • RECOVERY  2/27/2013    Performed by [facility] at SURGERY SAME DAY [facility] ORS   • BONE MARROW ASPIRATION  12/31/2012    Performed by Josemanuel Real M.D. at ENDOSCOPY [facility]   • BONE MARROW BIOPSY, NDL/TROCAR  12/31/2012    Performed by Josemanuel Real M.D. at ENDOSCOPY [facility] ORS   • GASTROSCOPY  9/28/2012    Performed by Aravind Richards M.D. at SURGERY [facility]   • SIGMOIDOSCOPY FLEX  9/26/2012    Performed by Kristopher Cardoso M.D. at ENDOSCOPY [facility]   • BRONCHOSCOPY-ENDO  6/19/2012    Performed by MARLENA MURILLO at ENDOSCOPY [facility] ORS   • BRONCHOSCOPY-ENDO  5/29/2012    Performed by SUYAPA CAMARENA at ENDOSCOPY [facility] ORS   • OTHER ABDOMINAL SURGERY  December 2011    [place] [facility] [place].   • GASTROSCOPY-ENDO  3/12/2010    Performed by CAMELIA SAMANO at ENDOSCOPY [facility] ORS   • EXAM UNDER ANESTHESIA  11/11/2009    Performed by BIRD ABDI at SURGERY [facility] ORS   • HEMORRHOIDECTOMY  11/11/2009    Performed by BIRD ABDI at SURGERY [facility] ORS   • KELBY BY LAPAROSCOPY  9/19/2009    Performed by BIRD ABDI at SURGERY [facility]   • CARPAL TUNNEL RELEASE          • KELBY BY LAPAROSCOPY     • GASTRIC BYPASS LAPAROSCOPIC     • HERNIA REPAIR      x3   • HYSTERECTOMY, TOTAL ABDOMINAL          • OTHER      Breast reduction   • OTHER      liver transplant   • NM ANESTH,NOSE,SINUS SURGERY      x4   • TONSILLECTOMY         CURRENT MEDICATIONS   Home Medications    **Home medications have not yet been reviewed for this encounter**         ALLERGIES  Allergies   Allergen Reactions   • Nsaids Unspecified     Can not take due to hx of liver transplant    • Rhubarb Anaphylaxis   • Organic Nitrates Unspecified     Nitroglycerin products should be avoided with  "the use of PDE-5 inhibitors as the combination can result in severe hypotension.  RD clarified with pt: Nitrates in food are okay and pt does not want nitrates to be listed as food allergy. Pt only avoids medications with nitrates.    • Other Drug      Any medication that may interact with cyclosporine, tacrolimus, sirolimus, or prograf (due to hx of liver transplant)        PHYSICAL EXAM  VITAL SIGNS: /66   Pulse 99   Temp 36.5 °C (97.7 °F) (Temporal)   Resp 20   Ht 1.753 m (5' 9\")   Wt 58 kg (127 lb 13.9 oz)   LMP 01/03/2000   SpO2 96%   BMI 18.88 kg/m²       Constitutional: Well developed, mild acute distress, Non-toxic appearance.   HENT: Normocephalic, previous craniotomy, Bilateral external ears normal,  Nose normal.   Eyes: PERRL, EOMI, Conjunctiva normal,   Neck: Right paraspinal tenderness, pain with range of motion, no midline C-spine tenderness supple   Cardiovascular: Normal heart rate, Normal rhythm  Thorax & Lungs: Normal breath sounds, No respiratory distress,No chest tenderness.   Abdomen: Benign abdominal exam, no guarding no rebound, no tenderness, no distention  Skin: Warm, Dry, No erythema, No rash.  Contusions to bilateral hands and right forearm.  Back: No tenderness, No CVA tenderness.   Extremities: Intact distal pulses, tenderness to the right shoulder, right elbow and right wrist.  No obvious deformity or anterior fullness noted.  +2 radial pulse bilaterally.  Good  strength.  Neurologic: Alert & oriented x 3, Normal motor function, Normal sensory function, No focal deficits noted.   Psychiatric: appropriate      Labs     Results for orders placed or performed during the hospital encounter of 03/19/22   CBC WITH DIFFERENTIAL   Result Value Ref Range    WBC 8.3 4.8 - 10.8 K/uL    RBC 2.69 (L) 4.20 - 5.40 M/uL    Hemoglobin 6.7 (L) 12.0 - 16.0 g/dL    Hematocrit 22.2 (L) 37.0 - 47.0 %    MCV 82.5 81.4 - 97.8 fL    MCH 24.9 (L) 27.0 - 33.0 pg    MCHC 30.2 (L) 33.6 - 35.0 g/dL "    RDW 51.7 (H) 35.9 - 50.0 fL    Platelet Count 131 (L) 164 - 446 K/uL    MPV 10.4 9.0 - 12.9 fL    Neutrophils-Polys 83.10 (H) 44.00 - 72.00 %    Lymphocytes 10.10 (L) 22.00 - 41.00 %    Monocytes 5.90 0.00 - 13.40 %    Eosinophils 0.10 0.00 - 6.90 %    Basophils 0.20 0.00 - 1.80 %    Immature Granulocytes 0.60 0.00 - 0.90 %    Nucleated RBC 0.00 /100 WBC    Neutrophils (Absolute) 6.85 2.00 - 7.15 K/uL    Lymphs (Absolute) 0.83 (L) 1.00 - 4.80 K/uL    Monos (Absolute) 0.49 0.00 - 0.85 K/uL    Eos (Absolute) 0.01 0.00 - 0.51 K/uL    Baso (Absolute) 0.02 0.00 - 0.12 K/uL    Immature Granulocytes (abs) 0.05 0.00 - 0.11 K/uL    NRBC (Absolute) 0.00 K/uL    Hypochromia 2+ (A)     Anisocytosis 1+     Macrocytosis 1+    TROPONIN   Result Value Ref Range    Troponin T 27 (H) 6 - 19 ng/L   COMP METABOLIC PANEL   Result Value Ref Range    Sodium 131 (L) 135 - 145 mmol/L    Potassium 4.4 3.6 - 5.5 mmol/L    Chloride 88 (L) 96 - 112 mmol/L    Co2 20 20 - 33 mmol/L    Anion Gap 23.0 (H) 7.0 - 16.0    Glucose 288 (H) 65 - 99 mg/dL    Bun 33 (H) 8 - 22 mg/dL    Creatinine 1.46 (H) 0.50 - 1.40 mg/dL    Calcium 8.3 (L) 8.4 - 10.2 mg/dL    AST(SGOT) 31 12 - 45 U/L    ALT(SGPT) 38 2 - 50 U/L    Alkaline Phosphatase 85 30 - 99 U/L    Total Bilirubin 0.5 0.1 - 1.5 mg/dL    Albumin 4.1 3.2 - 4.9 g/dL    Total Protein 6.2 6.0 - 8.2 g/dL    Globulin 2.1 1.9 - 3.5 g/dL    A-G Ratio 2.0 g/dL   LACTIC ACID   Result Value Ref Range    Lactic Acid 2.9 (H) 0.5 - 2.0 mmol/L   ESTIMATED GFR   Result Value Ref Range    GFR (CKD-EPI) 40 (A) >60 mL/min/1.73 m 2   Lactic Acid   Result Value Ref Range    Lactic Acid 1.6 0.5 - 2.0 mmol/L   PLATELET ESTIMATE   Result Value Ref Range    Plt Estimation Normal    MORPHOLOGY   Result Value Ref Range    RBC Morphology Present     Polychromia 2+    DIFFERENTIAL COMMENT   Result Value Ref Range    Comments-Diff see below    VENOUS BLOOD GAS   Result Value Ref Range    Venous Bg Ph 7.61 (HH) 7.31 - 7.45     Venous Bg Pco2 23.6 (L) 41.0 - 51.0 mmHg    Venous Bg Po2 158.5 (H) 25.0 - 40.0 mmHg    Venous Bg O2 Saturation 97.3 %    Venous Bg Hco3 23 (L) 24 - 28 mmol/L    Venous Bg Base Excess 2 mmol/L    Body Temp see below Centigrade   COD - Adult (Type and Screen)   Result Value Ref Range    ABO Grouping Only B     Rh Grouping Only POS     Antibody Screen-Cod NEG     Component R       R99                 Red Cells, LR       [id number]   issued       22   17:41      Product Type R99     Dispense Status issued     Unit Number (Barcoded) [id number]     Product Code (Barcoded) B9731W19     Blood Type (Barcoded) 7300    EKG (NOW)   Result Value Ref Range    Report       [facility] Emergency Dept.    Test Date:  2022  Pt Name:    VICK LI             Department: [facility]  MRN:        [id number]                      [facility]  Gender:     Female                       Technician: EO  :        [id number]                   Requested By:HANANE HARRELL  Order #:    [id number]                    Reading MD: Hanane Hoang MD    Measurements  Intervals                                Axis  Rate:       97                           P:          62  GA:         144                          QRS:        50  QRSD:       78                           T:          62  QT:         380  QTc:        483    Interpretive Statements  SINUS RHYTHM  Compared to ECG 2022 09:23:51  No significant changes  Electronically Signed On 3- 16:25:34 PDT by Hanane Hoang MD       *Note: Due to a large number of results and/or encounters for the requested time period, some results have not been displayed. A complete set of results can be found in Results Review.       RADIOLOGY/PROCEDURES  CT-HEAD W/O   Final Result      1.  There is no acute intracranial hemorrhage.   2.  Stable postoperative changes of left frontal parietal craniotomy with adjacent underlying dural thickening.   3.  Stable  mild frontal atrophy.         CT-CSPINE WITHOUT PLUS RECONS   Final Result      No acute fracture or dislocation seen in the CT scan of the cervical spine.      DX-WRIST-COMPLETE 3+ RIGHT   Final Result      1.  There is no acute fracture or malalignment of the right wrist. There is minimal degenerative change in the wrist.      DX-ELBOW-LIMITED 2- RIGHT   Final Result      1.  No acute fracture or dislocation.      2.  Evidence of previous ulnar fracture is noted.      DX-SHOULDER 2+ RIGHT   Final Result      1.  There is a comminuted multipart fracture of the right lateral head and neck.      EC-ECHOCARDIOGRAM COMPLETE W/O CONT    (Results Pending)       COURSE & MEDICAL DECISION MAKING  Pertinent Labs & Imaging studies reviewed. (See chart for details)  Patient presents to the emergency department with complaints of head injury and neck pain after a fall.  However overall patient states she has not been feeling well over the last several months with weight loss foggy brain and fatigue.  Concerning the trauma will obtain a CT of the head and neck to rule out new fracture.  History of a subdural in August status post craniotomy.  Patient also is a liver transplant patient.  She denies any recent fevers or illness.  However she is at risk for illness due to her transplant status.  Will check electrolytes    Patient has a normal white count.  Patient is anemic at 6.7.  Electrolytes show a glucose of 288 with a anion gap of 23 but a normal CO2.  Chloride is 88.  Patient has an elevated creatinine from previous levels.  Lactic acid is 2.9.  Troponin is minimally elevated at 27.  I think the patient is dehydrated which is contributing to her lab abnormalities.  I also think the anemia is likely the etiology of the patient's weakness and fatigue.    Patient does have evidence of a comminuted multipart fracture of the right lateral neck and head of the humerus.  Discussed the case with Dr. Irwin and and he will put a  note in on the patient.  Patient has been placed in a sling.    I did order a VBG and beta hydroxybutyric acid to make sure the patient was in DKA.  A type and screen is also been ordered as I do think patient would benefit from a transfusion.    Discussed the case with the hospitalist who will come and see the patient.    Patient will be hospitalized in guarded condition.    FINAL IMPRESSION     1. Fall, initial encounter    2. Humeral head fracture, right, closed, initial encounter    3. Anemia, unspecified type    4. Hyperglycemia    5. Renal insufficiency    6. Dehydration             Electronically signed by: Hanane Mcmanus M.D., 3/19/2022 2:51 PM

## 2022-03-19 NOTE — ED NOTES
Patient back from CT scan, 1st blood culture drawn and sent, lab aware we will need a second set.

## 2022-03-19 NOTE — ED NOTES
Med rec completed per pt and SO at bedside  Pt's SO has pt's RX bottles of tadalafil and ambrisentan with him   Allergies reviewed  Pt reports she had a one time dose of amoxicillin prior to a dental procedure sometime in the last month but unsure of exact date   Pt has a new RX for emgality but has not started using it yet

## 2022-03-19 NOTE — ED NOTES
IV established by Vadim ED tech patient tolerated well,  Patient medicated as ordered.  X ray at bedside updated on plan of care

## 2022-03-19 NOTE — ED TRIAGE NOTES
"Chief Complaint   Patient presents with   • GLF     Fell out of bed in her sleep. Right arm pain. Neck pain. Headache.   • Head Injury     C/o headache since falling out of bed. States she had a craniotomy in August due to a subdural hematoma that resulted from a head injury.   • Weight Loss     Also reports weight loss of 44lb over the last 2 months.   • Vision Change     \"my vision is all messed up\" for the last 3 months.   • Fatigue     Worsening over months.     ED Triage Vitals   Enc Vitals Group      Blood Pressure 03/19/22 1347 113/66      Pulse 03/19/22 1347 99      Respiration 03/19/22 1347 20      Temperature 03/19/22 1347 36.5 °C (97.7 °F)      Temp src 03/19/22 1347 Temporal      Pulse Oximetry 03/19/22 1347 96 %      Weight 03/19/22 1344 58 kg (127 lb 13.9 oz)      Height 03/19/22 1344 1.753 m (5' 9\")     "

## 2022-03-20 NOTE — ASSESSMENT & PLAN NOTE
Status post recent craniotomy leading to a seizure disorder  continue seizure medications  Seizure precautions

## 2022-03-20 NOTE — H&P
"Hospital Medicine History & Physical Note    Date of Service  3/19/2022    Primary Care Physician  Halley Levine M.D.    Consultants  Delmy, Dr. Irwin    Code Status  Full Code    Chief Complaint  Chief Complaint   Patient presents with   • GLF     Fell out of bed in her sleep. Right arm pain. Neck pain. Headache.   • Head Injury     C/o headache since falling out of bed. States she had a craniotomy in August due to a subdural hematoma that resulted from a head injury.   • Weight Loss     Also reports weight loss of 44lb over the last 2 months.   • Vision Change     \"my vision is all messed up\" for the last 3 months.   • Fatigue     Worsening over months.     History of Presenting Illness  Roxana Cuba is a 62 y.o. 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  Patient reports that she has severe, 10 out of 10 pain in the right shoulder after falling.  She is not sure if she lost consciousness or hit her head.  She denies having palpitations shortness of breath before or after the fall.  Pain is worse with attempt to move the shoulder.  The pain radiates to the right side of the neck.  No relieving factors for the pain.      I discussed the plan of care with emergency department physician, the patient and patient's family was present at bedside in the emergency room.    Review of Systems  Review of Systems   Constitutional: Positive for malaise/fatigue. Negative for chills and fever.   Eyes: Negative for discharge and redness.   Respiratory: Negative for cough, shortness of breath and stridor.    Cardiovascular: Negative for chest pain and leg " swelling.   Gastrointestinal: Negative for abdominal pain and vomiting.   Genitourinary: Negative for flank pain.   Musculoskeletal: Positive for falls, joint pain and neck pain. Negative for myalgias.   Skin: Negative.    Neurological: Negative for focal weakness.   Endo/Heme/Allergies: Does not bruise/bleed easily.   Psychiatric/Behavioral: The patient is not nervous/anxious.      Past Medical History   has a past medical history of Anemia, Anesthesia, Breath shortness, Bronchitis ( ), Cardiomegaly, Chronic fatigue and malaise, Chronic obstructive pulmonary disease (HCC), Cirrhosis (HCC) (December 2011), CKD (chronic kidney disease) stage 3, GFR 30-59 ml/min (HCC), Diabetes (HCC), Fracture of left foot, GERD (gastroesophageal reflux disease), H/O Clostridium difficile infection (02-17-17), Hemorrhagic disorder ([facility]), Hiatus hernia syndrome, High cholesterol, Hypothyroid, Jaundice (2009), Liver transplanted ([facility]), Low back pain (02-17-17), Mild aortic regurgitation and aortic valve sclerosis ( ), On home oxygen therapy, 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    Surgical History   has a past surgical history that includes pr anesth,nose,sinus surgery; makayla by laparoscopy (9/19/2009); exam under anesthesia (11/11/2009); hysterectomy, total abdominal; gastroscopy-endo (3/12/2010); bronchoscopy-endo (5/29/2012); bronchoscopy-endo (6/19/2012); sigmoidoscopy flex (9/26/2012); recovery (2/27/2013); bronchoscopy-endo (11/15/2013); recovery (1/21/2014); recovery (3/24/2014); hemorrhoidectomy (11/11/2009); gastroscopy (9/28/2012); carpal tunnel release; bone marrow aspiration (12/31/2012); bone marrow biopsy, ndl/trocar (12/31/2012); recovery (3/31/2014); other abdominal surgery (December 2011); bone marrow aspiration (3/16/2015); bone marrow biopsy, ndl/trocar  (3/16/2015); lung biopsy open (11/2016); tonsillectomy; other abdominal surgery (); breast biopsy (Left, 2/21/2017); colonoscopy (N/A, 3/27/2017); gastroscopy (N/A, 3/27/2017); gastric bypass laparoscopic; hernia repair; makayla by laparoscopy; other; other (12/11/2017); other; turbinate reduction (Bilateral, 10/4/2018); nasal reconstruction (Bilateral, 10/4/2018); ventral hernia repair robotic xi (N/A, 11/12/2018); craniotomy (Left, 8/4/2021); and pr upper gi endoscopy,diagnosis (N/A, 2/3/2022).     Family History  family history includes Alcohol/Drug in her maternal grandfather, maternal grandmother, and mother; Cancer in her paternal aunt; Diabetes in her father; Heart Disease in her father; Hyperlipidemia in her father and mother; Hypertension in her father; Non-contributory in her mother; Other in her father; Stroke in her father.     Social History   reports that she has never smoked. She has never used smokeless tobacco. She reports previous alcohol use. She reports that she does not use drugs.    Allergies  Allergies   Allergen Reactions   • Nsaids Unspecified     Can not take due to hx of liver transplant    • Rhubarb Anaphylaxis   • Organic Nitrates Unspecified     Nitroglycerin products should be avoided with the use of PDE-5 inhibitors as the combination can result in severe hypotension.  RD clarified with pt: Nitrates in food are okay and pt does not want nitrates to be listed as food allergy. Pt only avoids medications with nitrates.    • Other Drug      Any medication that may interact with cyclosporine, tacrolimus, sirolimus, or prograf (due to hx of liver transplant)      Medications  Prior to Admission Medications   Prescriptions Last Dose Informant Patient Reported? Taking?   Ascorbic Acid (VITAMIN C) 500 MG Chew Tab 3/18/2022 at noon Significant Other Yes No   Sig: Chew 500 mg every day. Indications: Inadequate Vitamin C   CALCIUM-VITAMIN D PO 3/18/2022 at pm Significant Other Yes Yes   Sig:  Take 1 Tablet by mouth every evening.   Galcanezumab-gnlm (EMGALITY) 120 MG/ML Solution Auto-injector new RX at not started Significant Other No No   Sig: Inject 1 PEN under the skin every 4 weeks.   albuterol (PROAIR HFA) 108 (90 Base) MCG/ACT Aero Soln inhalation aerosol >2 days at unknown Significant Other No No   Sig: Inhale 2 Puffs every four hours as needed for Shortness of Breath (wheezing).   ambrisentan (LETAIRIS) 10 MG tablet 3/19/2022 at am Significant Other No No   Sig: TAKE 1 TABLET BY MOUTH EVERY DAY   amitriptyline (ELAVIL) 25 MG Tab 3/18/2022 at pm Significant Other Yes No   Sig: Take 25 mg by mouth every evening.   clonazePAM (KLONOPIN) 0.5 MG Tab 3/19/2022 at am Significant Other No No   Sig: Take 1 Tablet by mouth 3 times a day for 30 days.   fludrocortisone (FLORINEF) 0.1 MG Tab 3/19/2022 at am Significant Other Yes No   Sig: Take 0.1 mg by mouth every day. Indications: Blood Pressure Drop Upon Standing   furosemide (LASIX) 20 MG Tab 3/19/2022 at am Significant Other No No   Sig: Take 1 Tablet by mouth every day. Indications: Edema   lacosamide (VIMPAT) 100 MG Tab tablet 3/19/2022 at am Significant Other No No   Sig: Take 1 Tablet by mouth 2 times a day for 30 days.   levetiracetam (KEPPRA) 750 MG tablet 3/19/2022 at am Significant Other Yes Yes   Sig: Take 750 mg by mouth 2 times a day.   levothyroxine (SYNTHROID) 137 MCG Tab 3/19/2022 at am Significant Other No No   Sig: Take 1 Tablet by mouth every morning on an empty stomach. Indications: Underactive Thyroid   magnesium oxide (MAG-OX) 400 MG Tab tablet 3/18/2022 at noon Significant Other Yes Yes   Sig: Take 400 mg by mouth every day.   metoclopramide (REGLAN) 10 MG Tab 3/19/2022 at am Significant Other Yes Yes   Sig: Take 10 mg by mouth 2 times a day.   mycophenolate (CELLCEPT) 250 MG Cap 3/19/2022 at am Significant Other Yes No   Sig: Take 250 mg by mouth 2 times a day. Indications: Liver Transplant Recipient   omeprazole (PRILOSEC) 40 MG  delayed-release capsule 3/19/2022 at am Significant Other No No   Sig: Take 1 Capsule by mouth every day.   ondansetron (ZOFRAN ODT) 4 MG TABLET DISPERSIBLE 3/18/2022 at unknown Significant Other No No   Sig: DISSOLVE 1 TAB ON TONGUE EVERY 8 HOURS AS NEEDED FOR NAUSEA   oxyCODONE immediate release (ROXICODONE) 10 MG immediate release tablet 3/19/2022 at 0400 Significant Other Yes No   Sig: Take 10 mg by mouth every 6 hours as needed for Moderate Pain. Take 1 tablet by mouth every 6-8 for moderate to severe pain   potassium chloride SA (KDUR) 20 MEQ Tab CR 3/19/2022 at am Significant Other No No   Sig: Take 1 Tablet by mouth every day.   pravastatin (PRAVACHOL) 20 MG Tab 3/19/2022 at am Significant Other No No   Sig: Take 1 Tablet by mouth every day.   sertraline (ZOLOFT) 100 MG Tab 3/19/2022 at am Significant Other No No   Sig: Take 1.5 Tablets by mouth every day.   tacrolimus (PROGRAF) 1 MG Cap 3/19/2022 at am Significant Other Yes No   Sig: Take 2 mg by mouth 2 times a day. Indications: Liver Transplant Recipient   tadalafil (CIALIS) 20 MG tablet 3/19/2022 at am Significant Other No No   Sig: Take 1 Tablet by mouth every day.   therapeutic multivitamin-minerals (THERAGRAN-M) Tab 3/18/2022 at noon Significant Other Yes No   Sig: Take 1 Tablet by mouth every day. Indications: suppliment      Facility-Administered Medications: None     Physical Exam  Temp:  [36.5 °C (97.7 °F)-37.8 °C (100.1 °F)] 37.2 °C (99 °F)  Pulse:  [] 96  Resp:  [16-20] 16  BP: (113-158)/() 137/115  SpO2:  [90 %-100 %] 93 %  Blood Pressure: 134/66   Temperature: 36.5 °C (97.7 °F)   Pulse: (!) 110   Respiration: 16   Pulse Oximetry: 98 %     Physical Exam  Constitutional:       General: She is not in acute distress.     Appearance: She is ill-appearing and diaphoretic.   HENT:      Head: Normocephalic and atraumatic.      Right Ear: External ear normal.      Left Ear: External ear normal.      Nose: No congestion or rhinorrhea.       Mouth/Throat:      Mouth: Mucous membranes are moist.      Pharynx: No oropharyngeal exudate or posterior oropharyngeal erythema.   Eyes:      General: No scleral icterus.        Right eye: No discharge.         Left eye: No discharge.      Conjunctiva/sclera: Conjunctivae normal.      Pupils: Pupils are equal, round, and reactive to light.   Cardiovascular:      Rate and Rhythm: Tachycardia present.      Heart sounds:     No friction rub. No gallop.   Pulmonary:      Effort: Pulmonary effort is normal.   Abdominal:      General: Abdomen is flat. There is no distension.      Tenderness: There is no guarding.   Musculoskeletal:         General: No swelling.      Cervical back: Neck supple. No rigidity. No muscular tenderness.      Right lower leg: No edema.      Left lower leg: No edema.   Skin:     Capillary Refill: Capillary refill takes 2 to 3 seconds.      Coloration: Skin is not jaundiced or pale.      Findings: No bruising or erythema.   Neurological:      Mental Status: She is alert and oriented to person, place, and time.   Psychiatric:         Mood and Affect: Mood normal.         Judgment: Judgment normal.       Laboratory:  Recent Labs     03/19/22  1415   WBC 8.3   RBC 2.69*   HEMOGLOBIN 6.7*   HEMATOCRIT 22.2*   MCV 82.5   MCH 24.9*   MCHC 30.2*   RDW 51.7*   PLATELETCT 131*   MPV 10.4     Recent Labs     03/19/22  1415   SODIUM 131*   POTASSIUM 4.4   CHLORIDE 88*   CO2 20   GLUCOSE 288*   BUN 33*   CREATININE 1.46*   CALCIUM 8.3*     Recent Labs     03/19/22  1415   ALTSGPT 38   ASTSGOT 31   ALKPHOSPHAT 85   TBILIRUBIN 0.5   GLUCOSE 288*         No results for input(s): NTPROBNP in the last 72 hours.      Recent Labs     03/19/22  1415   TROPONINT 27*     Imaging:  CT-HEAD W/O   Final Result      1.  There is no acute intracranial hemorrhage.   2.  Stable postoperative changes of left frontal parietal craniotomy with adjacent underlying dural thickening.   3.  Stable mild frontal atrophy.          CT-CSPINE WITHOUT PLUS RECONS   Final Result      No acute fracture or dislocation seen in the CT scan of the cervical spine.      DX-WRIST-COMPLETE 3+ RIGHT   Final Result      1.  There is no acute fracture or malalignment of the right wrist. There is minimal degenerative change in the wrist.      DX-ELBOW-LIMITED 2- RIGHT   Final Result      1.  No acute fracture or dislocation.      2.  Evidence of previous ulnar fracture is noted.      DX-SHOULDER 2+ RIGHT   Final Result      1.  There is a comminuted multipart fracture of the right lateral head and neck.      EC-ECHOCARDIOGRAM COMPLETE W/O CONT    (Results Pending)     I personally reviewed patient EKG it shows sinus tachycardia with a rate of 97, there is no ST elevation, there is no significant ST depression, QTc 483.     Assessment/Plan:  I anticipate this patient will require at least two midnights for appropriate medical management, necessitating inpatient admission.    Syncope- (present on admission)  Assessment & Plan  EKG shows sinus tachycardia with a rate of 97, there is no ST elevation, there is no significant ST depression, QTc 483.  Telemetry monitor  Echocardiography     Anemia requiring a blood transfusion- (present on admission)  Assessment & Plan  No evidence for gross bleeding.  Monitor hemoglobin   We will check occult blood in stool  Transfuse for a hemoglobin of less than or equal to 7   We will check iron studies [ferritin, iron saturation], consider parenteral iron replacement according to results.     IRMA (acute kidney injury) (HCC)- (present on admission)  Assessment & Plan  Mostly due to dehydration   We will hold home diuretics   Intravenous fluids  Avoid / minimize nephrotoxins as much as possible.  Monitor inputs and outputs   Consider renal ultrasound/nephrology consult renal function did not improve with above measures    Closed fracture of proximal end of humerus- (present on admission)  Assessment & Plan  Imaging shows  evidence for comminuted multipart fracture of the right lateral head and neck  Orthopedics will be consulted by EDP   Multimodal pain control  Consider physical and Occupational Therapy, pharmacologic prophylaxis when okay with orthopedics     Lactic acidosis- (present on admission)  Assessment & Plan  Initial lactic acid of 2.9, likely secondary to reduced intravascular volume.  We will trend anticipate improvement with rehydration    SDH (subdural hematoma) (HCC)- (present on admission)  Assessment & Plan  Status post recent craniotomy leading to a seizure disorder  Resume home seizure medications    Hyperglycemia- (present on admission)  Assessment & Plan  History of diabetes when she was 100 pounds heavier  I will start sliding scale insulin for now  Check glycated hemoglobin   Accu-Checks, hypoglycemia protocol     Status post liver transplant Dr. [place] ([facility])- (present on admission)  Assessment & Plan  Resume home immunosuppressive medications    Recurrent major depressive disorder, in remission (HCC)- (present on admission)  Assessment & Plan  Resume home amitriptyline and sertraline    GERD (gastroesophageal reflux disease)- (present on admission)  Assessment & Plan  Resume home omeprazole    Hypothyroidism- (present on admission)  Assessment & Plan  Resume home levothyroxine    VTE prophylaxis: SCDs/TEDs severe anemia requiring blood transfusion, thrombocytopenia

## 2022-03-20 NOTE — PROGRESS NOTES
Assumed pt care. AOx4. Pt is crying due to pain. Pain medication was recently administered (see MAR). RN will reevaluate with pt. RN  Discussed POC with pt and offered nonpharmacologic interventions as well but pt declined.  Pt verbalized understanding.  Hourly rounding in place. Fall precautions in place and call lights w/in reach.

## 2022-03-20 NOTE — ASSESSMENT & PLAN NOTE
Imaging shows evidence for comminuted multipart fracture of the right lateral head and neck  See above  Non surgical per orthopedic surgery  Sling supportive care  Following  PT/ OT

## 2022-03-20 NOTE — PROGRESS NOTES
"Hospital Medicine Daily Progress Note    Date of Service  3/20/2022    Chief Complaint  Roxana Cuba is a 62 y.o. 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 She presented to [facility] on 3/19/2022 with generalized weakness and right shoulder pain, neck pain after a fall.  Patient reported that she has severe, 10 out of 10 pain in the right shoulder after falling.  She is not sure if she lost consciousness or hit her head.  She denies having palpitations shortness of breath before or after the fall.  She was found to have a right sided nondisplaced proximal humerus fracture. This was evaluated by Dr. Irwin of orthopedic surgery who recommended a sling and supportive care with no surgical intervention.  CT of cervical spine showed no acute injury and acute CT of the head showed no acute issues, only chronic post operative changes and frontal atrophy.    She was also found to have acute on chronic anemia of 6.5. She received 2 units of prbc.    Interval Problem Update  She is axox3, she states \"I am miserable\" due to right sided neck and right shoulder pain that she rates at 8/10 and throbbing and painful spasms in the right shoulder. She denies numbness or tingling.  She denies any recent obvious bleeding, no melena, no hematochezia, no hematuria or epistaxis. She tolerated the blood transfusion well. We discussed different options and will start with a trial of adding lidocaine patch to the oral opiates that she is receiving and IV magnesium for the spasms as her mag is also low. She denies headache, no dizziness, she denies chest pain, no " sob. ROS otherwise negative.    I have personally seen and examined the patient at bedside. I discussed the plan of care with patient, nursing and case management    Consultants/Specialty    Code Status  Full Code    Disposition  Patient is not medically cleared for discharge.   Anticipate discharge to be determined    Review of Systems  Review of Systems   Constitutional: Negative.  Negative for chills, diaphoresis, fever, malaise/fatigue and weight loss.   HENT: Negative.  Negative for sore throat.    Eyes: Negative.  Negative for blurred vision.   Respiratory: Negative.  Negative for cough and shortness of breath.    Cardiovascular: Negative.  Negative for chest pain, palpitations and leg swelling.   Gastrointestinal: Negative.  Negative for abdominal pain, nausea and vomiting.   Genitourinary: Negative.  Negative for dysuria.   Musculoskeletal: Positive for falls, joint pain and neck pain. Negative for myalgias.   Skin: Negative.  Negative for itching and rash.   Neurological: Negative.  Negative for dizziness, focal weakness, weakness and headaches.   Endo/Heme/Allergies: Negative.  Does not bruise/bleed easily.   Psychiatric/Behavioral: Negative.  Negative for depression, substance abuse and suicidal ideas.   All other systems reviewed and are negative.       Physical Exam  Temp:  [36.4 °C (97.6 °F)-37.8 °C (100.1 °F)] 36.8 °C (98.3 °F)  Pulse:  [] 87  Resp:  [15-20] 15  BP: (102-159)/() 122/58  SpO2:  [90 %-100 %] 96 %    Physical Exam  Vitals and nursing note reviewed. Exam conducted with a chaperone present.   Constitutional:       General: She is not in acute distress.     Appearance: Normal appearance. She is not diaphoretic.   HENT:      Head: Normocephalic.      Nose: Nose normal. No congestion or rhinorrhea.      Mouth/Throat:      Mouth: Mucous membranes are moist.      Pharynx: Oropharynx is clear. No oropharyngeal exudate.   Eyes:      Pupils: Pupils are equal, round, and reactive to  light.   Cardiovascular:      Rate and Rhythm: Normal rate and regular rhythm.      Pulses: Normal pulses.      Heart sounds: Normal heart sounds. No murmur heard.  Pulmonary:      Effort: Pulmonary effort is normal. No respiratory distress.      Breath sounds: Normal breath sounds. No wheezing, rhonchi or rales.   Chest:      Chest wall: No tenderness.   Abdominal:      General: Abdomen is flat. Bowel sounds are normal. There is no distension.      Palpations: Abdomen is soft. There is no mass.      Tenderness: There is no abdominal tenderness. There is no left CVA tenderness, guarding or rebound.   Musculoskeletal:         General: No swelling or deformity. Normal range of motion.      Cervical back: Normal range of motion and neck supple. No rigidity or tenderness.      Comments: Right arm in sling - sensation and pulses intact   Lymphadenopathy:      Cervical: No cervical adenopathy.   Skin:     General: Skin is warm and dry.      Capillary Refill: Capillary refill takes less than 2 seconds.      Coloration: Skin is pale. Skin is not jaundiced.      Findings: No erythema.   Neurological:      General: No focal deficit present.      Mental Status: She is alert and oriented to person, place, and time.      Cranial Nerves: No cranial nerve deficit.      Sensory: No sensory deficit.      Motor: No weakness.      Coordination: Coordination normal.   Psychiatric:         Mood and Affect: Mood normal.         Behavior: Behavior normal.         Fluids    Intake/Output Summary (Last 24 hours) at 3/20/2022 1016  Last data filed at 3/20/2022 0837  Gross per 24 hour   Intake 1040.83 ml   Output --   Net 1040.83 ml       Laboratory  Recent Labs     03/19/22  1415 03/19/22  2239 03/20/22  0053   WBC 8.3 5.7 5.1   RBC 2.69* 2.53* 2.56*   HEMOGLOBIN 6.7* 6.7* 6.5*   HEMATOCRIT 22.2* 21.1* 21.0*   MCV 82.5 83.4 82.0   MCH 24.9* 26.5* 25.4*   MCHC 30.2* 31.8* 31.0*   RDW 51.7* 50.2* 48.9   PLATELETCT 131* 105* 90*   MPV 10.4 9.8  9.0     Recent Labs     03/19/22  1415 03/20/22  0053   SODIUM 131* 135   POTASSIUM 4.4 4.1   CHLORIDE 88* 98   CO2 20 26   GLUCOSE 288* 221*   BUN 33* 34*   CREATININE 1.46* 1.44*   CALCIUM 8.3* 7.6*                   Imaging  CT-HEAD W/O   Final Result      1.  There is no acute intracranial hemorrhage.   2.  Stable postoperative changes of left frontal parietal craniotomy with adjacent underlying dural thickening.   3.  Stable mild frontal atrophy.         CT-CSPINE WITHOUT PLUS RECONS   Final Result      No acute fracture or dislocation seen in the CT scan of the cervical spine.      DX-WRIST-COMPLETE 3+ RIGHT   Final Result      1.  There is no acute fracture or malalignment of the right wrist. There is minimal degenerative change in the wrist.      DX-ELBOW-LIMITED 2- RIGHT   Final Result      1.  No acute fracture or dislocation.      2.  Evidence of previous ulnar fracture is noted.      DX-SHOULDER 2+ RIGHT   Final Result      1.  There is a comminuted multipart fracture of the right lateral head and neck.      EC-ECHOCARDIOGRAM COMPLETE W/O CONT    (Results Pending)        Assessment/Plan  Syncope- (present on admission)  Assessment & Plan  EKG shows sinus tachycardia with a rate of 97, there is no ST elevation, there is no significant ST depression, QTc 483.  No significant findings on tele overnight, continue  Echocardiography pending    Closed fracture of proximal end of humerus- (present on admission)  Assessment & Plan  Imaging shows evidence for comminuted multipart fracture of the right lateral head and neck  See above  Non surgical per orthopedic surgery  Sling supportive care  Following  PT/ OT    Lactic acidosis- (present on admission)  Assessment & Plan  Initial lactic acid of 2.9, likely secondary to reduced intravascular volume.  Now resolved    Anemia requiring a blood transfusion- (present on admission)  Assessment & Plan  No evidence of gross bleeding.  In stable range  Acute on  chronic  Stool occult blood pending  Transfused overnight - following  Transfuse for a hemoglobin of less than or equal to 7   Iron studies pending  following.     Hypocalcemia- (present on admission)  Assessment & Plan  Will check ionized ca  following    SDH (subdural hematoma) (HCC)- (present on admission)  Assessment & Plan  Status post recent craniotomy leading to a seizure disorder  continue seizure medications  Seizure precautions    Hyperglycemia- (present on admission)  Assessment & Plan  History of diabetes when she was 100 pounds heavier  Continue sliding scale   glycated hemoglobin pending  Accu-Checks, hypoglycemia protocol     IRMA (acute kidney injury) ([facility])- (present on admission)  Assessment & Plan  Consistent dehydration on admit - improving- will stop iv hydration  Home diuretics held - following and will restart when appropriate  following    Recurrent major depressive disorder, in remission ([facility])- (present on admission)  Assessment & Plan  continue home amitriptyline and sertraline  Patient reports depression is stable    GERD (gastroesophageal reflux disease)- (present on admission)  Assessment & Plan  continue omeprazole    Chronic respiratory failure (HCC)- (present on admission)  Assessment & Plan  On 4L at baseline  No evidence of exacerbation  following    Hypothyroidism- (present on admission)  Assessment & Plan  Resume home levothyroxine    Status post liver transplant Dr. [place] ([facility])- (present on admission)  Assessment & Plan  Continue chronic immunosuppressive medications  Has follow up next week planned       VTE prophylaxis: SCDs/TEDs    I have performed a physical exam and reviewed and updated ROS and Plan today (3/20/2022). In review of yesterday's note (3/19/2022), there are no changes except as documented above.

## 2022-03-20 NOTE — PROGRESS NOTES
4 Eyes Skin Assessment Completed by PENNY Hollins and PENNY Garcia.    Head WDL  Ears WDL  Nose WDL  Mouth WDL  Neck WDL  Breast/Chest WDL  Shoulder Blades WDL  Spine Bruising  (R) Arm/Elbow/Hand Bruising  (L) Arm/Elbow/Hand Bruising  Abdomen WDL  Groin WDL  Scrotum/Coccyx/Buttocks WDL  (R) Leg WDL  (L) Leg WDL  (R) Heel/Foot/Toe WDL  (L) Heel/Foot/Toe WDL          Devices In Places Tele Box, Blood Pressure Cuff, Pulse Ox and Nasal Cannula      Interventions In Place Gray Ear Foams, Pillows and Pressure Redistribution Mattress    Possible Skin Injury No    Pictures Uploaded Into Epic N/A  Wound Consult Placed N/A  RN Wound Prevention Protocol Ordered No

## 2022-03-20 NOTE — FLOWSHEET NOTE
03/19/22 1953   Patient History   Pulmonary Diagnosis COPD   Home O2 Yes   Oxygen liter flow 4   Nocturnal CPAP No   Home Treatments/Frequency Yes   MDI 1/Frequency albuterol PRN   Sleep Apnea Screening   Have you had a sleep study? No   Have you been diagnosed with sleep apnea? No   S - Have you been told that you SNORE? 0   T - Are you often TIRED during the day? 1   O - Do you know if you stop breathing or has anyone witnessed you stop breathing while you were asleep? (OBSTRUCTION) 0   P - Do you have high blood PRESSURE or on medication to control high blood pressure? 0   B - Is your Body Mass Index greater than 35? (BMI) 0   A - Are you 50 years old or older? (AGE) 1   N - Are you a male with a NECK circumference greater than 17 inches, or a female with a neck circumference greater than 16 inches? 0   G - Are you male? (GENDER) 0   Stop Bang Total Score 2   COPD Risk Screening   Do you have a history of COPD? Yes   Do you have a Pulmonologist? Yes

## 2022-03-20 NOTE — CARE PLAN
Here for TDAP injection. Patient without complaint of pain at this time ,Injection given. Tolerated well. No pain noted after injection.  Advised to wait in lobby 15 minutes and report any adverse reactions.         Site: LD    Baby Friendly Handout Given to Patient   The patient is Watcher - Medium risk of patient condition declining or worsening    Shift Goals  Clinical Goals: hgb >7, pain control  Patient Goals: pain control    Progress made toward(s) clinical / shift goals:      Problem: Skin Integrity  Goal: Skin integrity is maintained or improved  Outcome: Progressing     Problem: Fall Risk  Goal: Patient will remain free from falls  Outcome: Progressing          Patient is not progressing towards the following goals:    Pt pain stayed at 8 or above through out the night. New pain medications were ordered and administered. Pt states the pain is still renetta, but tolerable at this time.     Pt Hgb came back 6.7 tonight and is currently receiving her 2nd unit of blood. Will continue to monitor.       Problem: Pain - Standard  Goal: Alleviation of pain or a reduction in pain to the patient’s comfort goal  Outcome: Not Progressing     Problem: Knowledge Deficit - Standard  Goal: Patient and family/care givers will demonstrate understanding of plan of care, disease process/condition, diagnostic tests and medications  Outcome: Not Progressing

## 2022-03-20 NOTE — ASSESSMENT & PLAN NOTE
No evidence of gross bleeding.  Acute on chronic  Acute anemia 6.5 at admission.  Baseline hemoglobin 8s 2 months ago.   Transfused prbc 3/19, 3/20, 3/23  Per chart review, patient had EGD 2/2022 for persistent nausea vomitingwhich was negative for acute pathology or bleeding signs.  Known history of multiple bariatric surgeries.    Her hemoglobin has been stable at 8s after RBC transfusion.  Occult test was not able to be done due to no bowel movement for past 2 days.  .   Iron study does not show iron deficiency.  B12 was normal  2 months ago    Transfuse for a hemoglobin of less than or equal to 7     Continue to follow

## 2022-03-20 NOTE — ASSESSMENT & PLAN NOTE
EKG shows sinus tachycardia with a rate of 97, there is no ST elevation, there is no significant ST depression, QTc 483.  No significant findings on tele   Echocardiography revealed no significant findings  Known hx of recent SDH post craniotomy leading to a seizure disorder    Continue seizure meds and tele monitor

## 2022-03-20 NOTE — ASSESSMENT & PLAN NOTE
History of diabetes when she was 100 pounds heavier  Continue sliding scale   glycated hemoglobin pending  Accu-Checks, hypoglycemia protocol

## 2022-03-20 NOTE — ASSESSMENT & PLAN NOTE
continue home amitriptyline and sertraline    Psychiatry consulted for unstable mood.    Psych recommended to continue current medication, added  Remeron 7.5 mg nightly.  Continue to follow.

## 2022-03-20 NOTE — ED NOTES
"Vitals: Temp: 95.7  F (35.4  C) Temp src: Axillary BP: (!) 150/81 Pulse: 65 Heart Rate: 92 Resp: 18 SpO2: 97 % O2 Device: None (Room air)       Time: 3637-0705  Reason for admission: PMH of metastatic cervical cancer and colon cancer, presents for further hospice and palliative care needs  Activity:  SBA to bedside commode  Pain:  Pt reports back pain. No pain medication given on this shift pt reports not needing any pain medication prior to ride to inpatient hospice facility.    Neuro: A&O x 4. Intermittently confused, redirectable.    Cardiac: int HTN.   Respiratory:  WDL, LS clear/diminished  GI/: Voiding adequately in bedside commode. Pt did not want to answer when last BM was, last charted 5/16/20  Diet: No appetite in AM did not want anything for breakfast. Nausea - scheduled zofran given  Lines:  R PIV removed prior to discharge.   Incisions/Drains/Skin: Intact.   Lab:  No new labs today.   New changes this shift: Pt transferred to [facility] inpatient hospice at noon.      Continue to monitor and follow POC  " Elizabeth from lab called PH of 7.61 from venous blood gas.  Results read back to Elizabeth Mcmanus notified

## 2022-03-20 NOTE — PROGRESS NOTES
Pt pain continues to stay the same even after pharmacological and nonpharmacologic  interventions. RN notified MD Conley of 9/10 pain. New orders placed ( See MAR)

## 2022-03-20 NOTE — ASSESSMENT & PLAN NOTE
Consistent dehydration on admit - resolved, stable off of iv hydration  Home diuretics held - following and will restart when appropriate - euvolemic on exam  following

## 2022-03-21 NOTE — PROGRESS NOTES
Monitor Summary     Rhythm:SR 83-89  Measurements: 0.14/0.08/0.36  ECTOPIES: rPVC        Normal Values  Rhythm SR  HR Range    Measurements 0.12-0.20 / 0.06-0.10  / 0.30-0.52

## 2022-03-21 NOTE — DISCHARGE PLANNING
Anticipated Discharge Disposition: Anticipate SNF       Action: Discussed pt during IDT rounds. Pt pending PT/OT eval. Pt has 6 clicks score of 15/12.     Barriers to Discharge: Medical Clearance      Plan: Case management to follow up with PT/OT recommendations

## 2022-03-21 NOTE — THERAPY
"Occupational Therapy   Initial Evaluation     Patient Name: Roxana Cuba  Age:  62 y.o., Sex:  female  Medical Record #: [id number]  Today's Date: 3/21/2022     Precautions  Precautions: (P) Non Weight Bearing Right Upper Extremity,Sling Right Upper Extremity    Assessment  Patient is 62 y.o. female with a diagnosis of GLF with R proximal humerus fx.  Pt has h/o sarcoidosis, liver cirrhosis with transplant, SDH with craniotomy, fall in 9/21 with R elbow fx.  Spouse has been caring for pt full time but per RN he cannot provide the amount of care she requires at this time.  Pt currently is disoriented, confused and demos poor safety awareness and insight to limitations.  She is maxA for all self care tasks except toileting.  Iliana hand held assist to walk to BR only.  She is NOT safe for home, needs 24 hour supervision and assist with all mobility and ADl's.  She will benefit from further inpt post acute therapy prior to home.  OT will follow while in house.       Plan    Recommend Occupational Therapy 3 times per week until therapy goals are met for the following treatments:  Adaptive Equipment, Neuro Re-Education / Balance, Self Care/Activities of Daily Living, Therapeutic Activities, and Therapeutic Exercises.             Subjective    \"Its light outside all time time, so I can't tell if it's morning or night.\"     Objective       03/21/22 1021   Prior Living Situation   Prior Services Continuous (24 Hour) Care Giving Family   Housing / Facility 1 Story Apartment / Condo   Steps Into Home 0   Steps In Home 0   Bathroom Set up Bathtub / Shower Combination;Shower Chair   Equipment Owned Front-Wheel Walker;Tub / Shower Seat   Lives with - Patient's Self Care Capacity Spouse   Comments info obtained from previous notes, pt is poor historian and only able to report that she has a walker   Prior Level of ADL Function   Self Feeding Independent   Grooming / Hygiene Unable To Determine At This Time   Bathing Requires " "Assist   Dressing Requires Assist   Toileting Requires Assist   Comments Per notes spouse provides a lot of assist.  Per RN, spouse is reporting pt requires too much assist for him to handle now   Prior Level of IADL Function   Medication Management Requires Assist   Laundry Requires Assist   Kitchen Mobility Requires Assist   Finances Requires Assist   Home Management Requires Assist   Shopping Requires Assist   Prior Level Of Mobility Supervision With Device in Home   Driving / Transportation Relatives / Others Provide Transportation   Occupation (Pre-Hospital Vocational) Retired Due To Age   Leisure Interests Unable To Determine At This Time   History of Falls   History of Falls Yes   Date of Last Fall 09/10/21   Precautions   Precautions Non Weight Bearing Right Upper Extremity;Sling Right Upper Extremity   Vitals   O2 (LPM) 4   O2 Delivery Device Silicone Nasal Cannula   Pain 0 - 10 Group   Location Arm;Elbow;Shoulder   Location Orientation Right   Description Aching;Sore   Therapist Pain Assessment Prior to Activity;During Activity;Post Activity;Nurse Notified   Cognition    Cognition / Consciousness X   Orientation Level Not Oriented to Place;Not Oriented to Time  (asked \"Is it 10:00 in the morning or at night?\"  Even with cue to look out the window at the sun coming in, she couldn't deduce it was daytime.  She reported \"It's always light out so that doesn't mean anything.\")   Level of Consciousness Alert   Safety Awareness Impaired   New Learning Impaired   Attention Impaired   Comments Pt oriented to self, not oriented to hospital.  Kept asking if her  was here, and if the doctor was coming, but when told the hospital doctor would be by, she said \"I hate going to the hospital.  its takes so much effort to get there.  I don't want to go to the hospital to see the doctor\"  Pt with difficulty word finding, unable to provide accurate PLOF information, and changes subject or makes excuses for things she " cannot recall or retrieve.  She does have h/o SDH with craniotomy in the last couple years.  Demos cognitive impairment likely in part related to this.  Poor insight into her current medical state and impaired safety   Active ROM Upper Body   Active ROM Upper Body  X   Dominant Hand Right   Comments RUE in sling 2/2 humeral fx   Strength Upper Body   Upper Body Strength  X   Comments LUE appears WFL, RUE NT   Coordination Upper Body   Comments RUE in sling   Balance Assessment   Sitting Balance (Static) Fair   Sitting Balance (Dynamic) Fair   Standing Balance (Static) Fair -   Standing Balance (Dynamic) Poor +   Weight Shift Sitting Fair   Weight Shift Standing Fair   Comments hand held assist, cannot use device 2/2 RUE fx   Bed Mobility    Supine to Sit Minimal Assist   Sit to Supine Minimal Assist   Scooting Minimal Assist   ADL Assessment   Grooming Moderate Assist   Upper Body Dressing Maximal Assist   Lower Body Dressing Maximal Assist   Toileting Minimal Assist   How much help from another person does the patient currently need...   Putting on and taking off regular lower body clothing? 2   Bathing (including washing, rinsing, and drying)? 2   Toileting, which includes using a toilet, bedpan, or urinal? 2   Putting on and taking off regular upper body clothing? 2   Taking care of personal grooming such as brushing teeth? 2   Eating meals? 2   6 Clicks Daily Activity Score 12   Functional Mobility   Sit to Stand Minimal Assist   Bed, Chair, Wheelchair Transfer Minimal Assist   Toilet Transfers Minimal Assist   Mobility Iliana (hand held assist) to walk to bathroom and back to EOB   Distance (Feet) 20   # of Times Distance was Traveled 2   Visual Perception   Visual Perception  WDL   Edema / Skin Assessment   Comments RUE in sling   Activity Tolerance   Sitting Edge of Bed 2 min x2   Standing 2 min x2   Comments 4L O2 with activity   Patient / Family Goals   Patient / Family Goal #1 unable to state   Short Term  Goals   Short Term Goal # 1 Pt will dress UBand LB with Sol in 5 visits   Short Term Goal # 2 Pt will groom 8 min standing at sink with SBA in 5 visits   Short Term Goal # 3 pt will toilet SBA in 4 visits

## 2022-03-21 NOTE — CARE PLAN
The patient is Watcher - Medium risk of patient condition declining or worsening    Shift Goals  Clinical Goals: hgb >7, pain control  Patient Goals: pain control    Progress made toward(s) clinical / shift goals:        Problem: Knowledge Deficit - Standard  Goal: Patient and family/care givers will demonstrate understanding of plan of care, disease process/condition, diagnostic tests and medications  Outcome: Progressing       Patient is not progressing towards the following goals:      Problem: Pain - Standard  Goal: Alleviation of pain or a reduction in pain to the patient’s comfort goal  Outcome: Not Progressing   Pt continues to have uncontrolled pain. Non-pharmacological interventions such as repositioning are being done in between medications.

## 2022-03-21 NOTE — RESPIRATORY CARE
"   COPD EDUCATION by COPD CLINICAL EDUCATOR  3/21/2022 at 9:46 AM by Farida Nagel, RRT     Patient reviewed by COPD education team. Patient does have a history or diagnosis of COPD and is a non-smoker.  Patient did not want to talk or discuss COPD.    COPD Screen  COPD Risk Screening  Do you have a history of COPD?: Yes  Do you have a Pulmonologist?: Yes    COPD Assessment  COPD Clinical Specialists ONLY  COPD Education Initiated: No--Quick Screen (Pt very tearful did not want to talk, never smoked has a mixed PFT in 2016 and hx Sarcoidosis has rescue inhaler only.)  Is this a COPD exacerbation patient?: No (pt fell neck and arm pain)    PFT Results    No results found for: PFT in 2016    Meds to Beds        MY COPD ACTION PLAN     It is recommended that patients and physicians /healthcare providers complete this action plan together. This plan should be discussed at each physician visit and updated as needed.    The green, yellow and red zones show groups of symptoms of COPD. This list of symptoms is not comprehensive, and you may experience other symptoms. In the \"Actions\" column, your healthcare provider has recommended actions for you to take based on your symptoms.    Patient Name: Roxana Cuba   YOB: 1960   Last Updated on:     Green Zone:  I am doing well today Actions   •  Usual activitiy and exercise level •  Take daily medications   •  Usual amounts of cough and phlegm/mucus •  Use oxygen as prescribed   •  Sleep well at night •  Continue regular exercise/diet plan   •  Appetite is good •  At all times avoid cigarette smoke, inhaled irritants     Daily Medications (these medications are taken every day):                Yellow Zone:  I am having a bad day or a COPD flare Actions   •  More breathless than usual •  Continue daily medications   •  I have less energy for my daily activities •  Use quick relief inhaler as ordered   •  Increased or thicker phlegm/mucus •  Use oxygen as " "prescribed   •  Using quick relief inhaler/nebulizer more often •  Get plenty of rest   •  Swelling of ankles more than usual •  Use pursed lip breathing   •  More coughing than usual •  At all times avoid cigarette smoke, inhaled irritants   •  I feel like I have a \"chest cold\"   •  Poor sleep and my symptoms woke me up   •  My appetite is not good   •  My medicine is not helping    •  Call provider immediately if symptoms don’t improve     Continue daily medications, add rescue medications:               Medications to be used during a flare up, (as Discussed with Provider):              Red Zone:  I need urgent medical care Actions   •  Severe shortness of breath even at rest •  Call 911 or seek medical care immediately   •  Not able to do any activity because of breathing    •  Fever or shaking chills    •  Feeling confused or very drowsy     •  Chest pains    •  Coughing up blood              "

## 2022-03-21 NOTE — PROGRESS NOTES
"[facility] Medicine Daily Progress Note    Date of Service  3/21/2022    Chief Complaint  Roxana Cuba is a 62 y.o. 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 She presented to [facility] on 3/19/2022 with generalized weakness and right shoulder pain, neck pain after a fall.  Patient reported that she has severe, 10 out of 10 pain in the right shoulder after falling.  She is not sure if she lost consciousness or hit her head.  She denies having palpitations shortness of breath before or after the fall.  She was found to have a right sided nondisplaced proximal humerus fracture. This was evaluated by Dr. Irwin of orthopedic surgery who recommended a sling and supportive care with no surgical intervention.  CT of cervical spine showed no acute injury and acute CT of the head showed no acute issues, only chronic post operative changes and frontal atrophy.    She was also found to have acute on chronic anemia of 6.5. She received 2 units of prbc.    Interval Problem Update  She remains axox3, this am she said \"I am miserable, I am a miserable person.\" Upon further inquiry, she reports it is related to her arm pain but more than then it is related to overwhelm. She states she has felt overwhelmed since her transplant surgery and that she has a series of health challenges since including the recent subdural hematoma and seizures that affected her mood even more. She also reports feeling unsupported by and angry with her . She denies SI but feels stuck. She was tearful during our conversation, I taught her the mindfulness technique eft and she " mentioned significant emotional improvement and decrease in arm pain with this, arm pain now down from 10/10 to 6/10. She asked me to come back, which I did. She also agrees to meet with a psychologist.   No numbness or tingling, no chest pain, no melena or hematochezia. PT and OT are still pending, h/h in stable range, she remains on her chronic 4L. ROS otherwise negative.    I have personally seen and examined the patient at bedside. I discussed the plan of care with patient, nursing and case management    Consultants/Specialty    Code Status  Full Code    Disposition  Patient is not medically cleared for discharge.   Anticipate discharge to be determined    Review of Systems  Review of Systems   Constitutional: Negative.  Negative for chills, diaphoresis, fever, malaise/fatigue and weight loss.   HENT: Negative.  Negative for sore throat.    Eyes: Negative.  Negative for blurred vision.   Respiratory: Negative.  Negative for cough and shortness of breath.    Cardiovascular: Negative.  Negative for chest pain, palpitations and leg swelling.   Gastrointestinal: Negative.  Negative for abdominal pain, nausea and vomiting.   Genitourinary: Negative.  Negative for dysuria.   Musculoskeletal: Positive for falls and joint pain. Negative for myalgias and neck pain.   Skin: Negative.  Negative for itching and rash.   Neurological: Negative.  Negative for dizziness, focal weakness, weakness and headaches.   Endo/Heme/Allergies: Negative.  Does not bruise/bleed easily.   Psychiatric/Behavioral: Negative.  Negative for depression, substance abuse and suicidal ideas.   All other systems reviewed and are negative.       Physical Exam  Temp:  [36.6 °C (97.9 °F)-37 °C (98.6 °F)] 36.7 °C (98 °F)  Pulse:  [81-88] 88  Resp:  [16-18] 18  BP: (132-151)/(64-72) 134/69  SpO2:  [97 %-98 %] 97 %    Physical Exam  Vitals and nursing note reviewed. Exam conducted with a chaperone present.   Constitutional:       General: She is not in  acute distress.     Appearance: Normal appearance. She is not diaphoretic.   HENT:      Head: Normocephalic.      Nose: Nose normal. No congestion or rhinorrhea.      Mouth/Throat:      Mouth: Mucous membranes are moist.      Pharynx: Oropharynx is clear. No oropharyngeal exudate.   Eyes:      Pupils: Pupils are equal, round, and reactive to light.   Cardiovascular:      Rate and Rhythm: Normal rate and regular rhythm.      Pulses: Normal pulses.      Heart sounds: Normal heart sounds. No murmur heard.  Pulmonary:      Effort: Pulmonary effort is normal. No respiratory distress.      Breath sounds: Normal breath sounds. No wheezing, rhonchi or rales.   Chest:      Chest wall: No tenderness.   Abdominal:      General: Abdomen is flat. Bowel sounds are normal. There is no distension.      Palpations: Abdomen is soft. There is no mass.      Tenderness: There is no abdominal tenderness. There is no left CVA tenderness, guarding or rebound.   Musculoskeletal:         General: No swelling or deformity. Normal range of motion.      Cervical back: Normal range of motion and neck supple. No rigidity or tenderness.      Comments: Right arm in sling - sensation and pulses intact   Lymphadenopathy:      Cervical: No cervical adenopathy.   Skin:     General: Skin is warm and dry.      Capillary Refill: Capillary refill takes less than 2 seconds.      Coloration: Skin is pale. Skin is not jaundiced.      Findings: No erythema.   Neurological:      General: No focal deficit present.      Mental Status: She is alert and oriented to person, place, and time.      Cranial Nerves: No cranial nerve deficit.      Sensory: No sensory deficit.      Motor: No weakness.      Coordination: Coordination normal.   Psychiatric:         Mood and Affect: Mood normal.         Behavior: Behavior normal.         Fluids  No intake or output data in the 24 hours ending 03/21/22 0955    Laboratory  Recent Labs     03/20/22  0053 03/20/22  1021  03/21/22  0252   WBC 5.1 4.7* 4.3*   RBC 2.56* 3.02* 2.93*   HEMOGLOBIN 6.5* 8.0* 8.0*   HEMATOCRIT 21.0* 25.0* 24.9*   MCV 82.0 82.8 85.0   MCH 25.4* 26.5* 27.3   MCHC 31.0* 32.0* 32.1*   RDW 48.9 48.2 50.2*   PLATELETCT 90* 82* 92*   MPV 9.0 9.6 9.2     Recent Labs     03/19/22  1415 03/20/22  0053 03/21/22  0252   SODIUM 131* 135 137   POTASSIUM 4.4 4.1 4.1   CHLORIDE 88* 98 100   CO2 20 26 26   GLUCOSE 288* 221* 139*   BUN 33* 34* 21   CREATININE 1.46* 1.44* 1.01   CALCIUM 8.3* 7.6* 8.3*                   Imaging  EC-ECHOCARDIOGRAM COMPLETE W/O CONT   Final Result      CT-HEAD W/O   Final Result      1.  There is no acute intracranial hemorrhage.   2.  Stable postoperative changes of left frontal parietal craniotomy with adjacent underlying dural thickening.   3.  Stable mild frontal atrophy.         CT-CSPINE WITHOUT PLUS RECONS   Final Result      No acute fracture or dislocation seen in the CT scan of the cervical spine.      DX-WRIST-COMPLETE 3+ RIGHT   Final Result      1.  There is no acute fracture or malalignment of the right wrist. There is minimal degenerative change in the wrist.      DX-ELBOW-LIMITED 2- RIGHT   Final Result      1.  No acute fracture or dislocation.      2.  Evidence of previous ulnar fracture is noted.      DX-SHOULDER 2+ RIGHT   Final Result      1.  There is a comminuted multipart fracture of the right lateral head and neck.           Assessment/Plan  Syncope- (present on admission)  Assessment & Plan  EKG shows sinus tachycardia with a rate of 97, there is no ST elevation, there is no significant ST depression, QTc 483.  No significant findings on tele   Echocardiography revealed no significant findings    Closed fracture of proximal end of humerus- (present on admission)  Assessment & Plan  Imaging shows evidence for comminuted multipart fracture of the right lateral head and neck  See above  Non surgical per orthopedic surgery  Sling supportive care  Following  PT/ OT    Lactic  acidosis- (present on admission)  Assessment & Plan  Initial lactic acid of 2.9, likely secondary to reduced intravascular volume.  Now resolved    Anemia requiring a blood transfusion- (present on admission)  Assessment & Plan  No evidence of gross bleeding.  In stable range  Acute on chronic  Stool occult blood still pending  Transfused prbc 3/19  Transfuse for a hemoglobin of less than or equal to 7     Continue to follow    Hypocalcemia- (present on admission)  Assessment & Plan  ionized ca within normal limits      SDH (subdural hematoma) ([facility])- (present on admission)  Assessment & Plan  Status post recent craniotomy leading to a seizure disorder  continue seizure medications  Seizure precautions    Hyperglycemia- (present on admission)  Assessment & Plan  History of diabetes when she was 100 pounds heavier  Continue sliding scale   glycated hemoglobin pending  Accu-Checks, hypoglycemia protocol     Thrombocytopenia ([facility])  Assessment & Plan  Chronic  Related to liver disease and transplant  In stable range  Follow intermittently    IRMA (acute kidney injury) ([facility])- (present on admission)  Assessment & Plan  Consistent dehydration on admit - resolved, stable off of iv hydration  Home diuretics held - following and will restart when appropriate - today euvolemic on exam  following    Recurrent major depressive disorder, in remission ([facility])- (present on admission)  Assessment & Plan  continue home amitriptyline and sertraline  Patient reports depression is stable    GERD (gastroesophageal reflux disease)- (present on admission)  Assessment & Plan  continue omeprazole    Chronic respiratory failure ([facility])- (present on admission)  Assessment & Plan  On 4L at baseline  No evidence of exacerbation  following    DUC (generalized anxiety disorder)- (present on admission)  Assessment & Plan  See above  Depression and overwhelm related to health issues contributing  Psychology consulted    Hypothyroidism- (present on  admission)  Assessment & Plan  Resume home levothyroxine    Status post liver transplant Dr. [place] ([facility])- (present on admission)  Assessment & Plan  Continue chronic immunosuppressive medications  Has follow up next week planned       VTE prophylaxis: SCDs/TEDs    I have performed a physical exam and reviewed and updated ROS and Plan today (3/21/2022). In review of yesterday's note (3/20/2022), there are no changes except as documented above.

## 2022-03-21 NOTE — THERAPY
Physical Therapy   Initial Evaluation     Patient Name: Roxana Cuba  Age:  62 y.o., Sex:  female  Medical Record #: [id number]  Today's Date: 3/21/2022     Precautions  Precautions: Non Weight Bearing Right Upper Extremity;Sling Right Upper Extremity    Assessment  Patient is 62 y.o. female s/p fall resulting in R humerus fracture, non-operative.  Pt is also confused and is a poor historian, history of SDH/TBI.  Pt is presenting with impaired balance and R shoulder pain limiting activity. Pt is a high fall risk and would benefit from further acute PT with follow up therapy at SNF.     Plan    Recommend Physical Therapy 4 times per week until therapy goals are met for the following treatments:  Bed Mobility, Gait Training, Neuro Re-Education / Balance, Stair Training, Therapeutic Activities, and Therapeutic Exercises    DC Equipment Recommendations: Unable to determine at this time  Discharge Recommendations: Recommend post-acute placement for additional physical therapy services prior to discharge home        03/21/22 1020   Prior Living Situation   Housing / Facility 1 Story House   Equipment Owned Unable to Determine At This Time   Lives with - Patient's Self Care Capacity Spouse   Comments Pt is a poor historian, unable to determine stairs to enter and DME   Prior Level of Functional Mobility   Bed Mobility Independent   Transfer Status Independent   Ambulation Independent   Assistive Devices Used Unable to Determine At This Time   Comments Pt reports was indep prior to admit and was unclear if required an AD   History of Falls   History of Falls Yes   Cognition    Level of Consciousness Alert   Comments Pt is oriented to name only, confused, poor historian. Pt stated throught she saw bugs moving on the floor   Passive ROM Lower Body   Passive ROM Lower Body Not Tested   Active ROM Lower Body    Active ROM Lower Body  WDL   Strength Lower Body   Lower Body Strength  WDL   Balance Assessment   Sitting Balance  (Static) Fair   Sitting Balance (Dynamic) Fair   Standing Balance (Static) Fair -   Standing Balance (Dynamic) Poor +   Weight Shift Sitting Fair   Weight Shift Standing Fair   Gait Analysis   Gait Level Of Assist Minimal Assist   Assistive Device Hand Held Assist   Distance (Feet) 20   # of Times Distance was Traveled 2   Deviation Shuffled Gait   Bed Mobility    Supine to Sit Minimal Assist   Sit to Supine Minimal Assist   Functional Mobility   Sit to Stand Minimal Assist   Toilet Transfers Minimal Assist   Activity Tolerance   Standing pt on 4L nc   Short Term Goals    Short Term Goal # 1 Pt will be able to perform bed mobility and sup <> sit Jolly in 6 visits.   Short Term Goal # 2 Pt will be able to perform sit <> stand and transfer Jolly in 6 visits.   Short Term Goal # 3 Pt will be able to ambulate 150 ft with SPC Jolly in 6 visits.

## 2022-03-21 NOTE — TELEPHONE ENCOUNTER
"· orders paperwork received from [facility] requiring provider signature.     · All appropriate fields completed by Medical Assistant: N/A CMA printed and distributed to MA    · Paperwork placed in \"MA to Provider\" folder/basket. Awaiting provider completion/signature.  "

## 2022-03-21 NOTE — DIETARY
"Nutrition services: Day 2 of admit.  Roxana Cuba is a 62 y.o. female with admitting DX of IRMA (acute kidney injury).    Consult received for MST of on nutrition screen due to report of >/= 34 lb wt loss x 3 months and poor PO PTA.       Assessment:  Height: 175.3 cm (5' 9\")  Weight: 61.4 kg (135 lb 5.8 oz)(bed scale)  Body mass index is 19.99 kg/m²., BMI classification: WNL but on the low end  Diet/Intake: regular/ 50-75% at breakfast yesterday    Evaluation:   1. Per H&P, pt reports wt loss of 44 lb (25%) over the last 2 months. DX includes closed fracture of humerus, hx of subdural hematoma s/p craniotomy w/ seizure disorder, hyperglycemia, major depressive disorder, GERD, chronic respiratory failure on 4 liters O2 at baseline, anxiety disorder, hypothyroidism, and hx of liver transplant.  2. RD met with pt in her room. Pt with lunch tray on bedside table. She ate most of her mac and cheese and some of her salad. She reports poor appetite but she is forcing herself to eat because she knows she needs to. She did not want supplements added or modifications to her meals. She does not like Boost supplements.   3. She agreed with report of weight loss. She agreed to 34 lb wt loss. She said that time period of wt loss was ~ 3 months. She said that her intake has been less by a lot. She did not clarify by how much her intake was less.  4. Discrepancy noted in report of wt loss with wt loss reported to MD different from what pt told this RD. Pt with prior wt hx in Epic. Confirmed weight loss noted of 1.4 kg (2.2%) x 3 months and 5.3 kg (7.9%) x 6 months. Weight gain noted x 6 days of 3.3 kg.   5. Pt does not appear malnourished.    6. Labs: glucose accu-ck: 132-271 (improving). 10/28/21: A1c=4.3  7. Meds: SSI    Malnutrition Risk: criteria not met    Recommendations/Plan:   1. Encourage intake of >50%  2. Document intake of all meals as % taken in ADL's to provide interdisciplinary communication across all shifts. "   3. Monitor weight.  4. Nutrition rep will continue to see patient for ongoing meal and snack preferences.     RD will follow.

## 2022-03-22 NOTE — PROGRESS NOTES
Upon initial entrance into room found pt to have bottle of own personal oxycodone at bedside. She denies taking any, VSS. Educated that she could not have personal medication at bedside but we would provide whatever she should need; she expressed understanding. She provided permission for this RN to look through rest of belongings, found bottle of amitriptyline also. Both medication bottles picked up by pharmacy.

## 2022-03-22 NOTE — CARE PLAN
The patient is Stable - Low risk of patient condition declining or worsening    Shift Goals  Clinical Goals: pain control  Patient Goals: pain control, sleep    Progress made toward(s) clinical / shift goals:  pain medication administered as ordered, reassurance provided      Problem: Pain - Standard  Goal: Alleviation of pain or a reduction in pain to the patient’s comfort goal  Outcome: Progressing     Problem: Fall Risk  Goal: Patient will remain free from falls  Outcome: Progressing

## 2022-03-22 NOTE — PROGRESS NOTES
12 Hour chart check complete. Pain assessed frequently throughout shift. Pharmacologic and non-pharmacologic interventions performed.

## 2022-03-22 NOTE — PROGRESS NOTES
Patient has an appointment to get follow up labs completed for her liver transplant on Wednesday 3/23/2022.  brought in lab slip to see if we could run them while she is here. Dr. Ac aware.

## 2022-03-22 NOTE — PROGRESS NOTES
Pain regularly assessed throughout shift. 12 hour chart check complete. Pharmacologic and nonpharmacologic interventions in place.

## 2022-03-22 NOTE — PROGRESS NOTES
[facility] Medicine Daily Progress Note    Date of Service  3/22/2022    Chief Complaint  Roxana Cuba is a 62 y.o. 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 She presented to [facility] on 3/19/2022 with generalized weakness and right shoulder pain, neck pain after a fall.  Patient reported that she has severe, 10 out of 10 pain in the right shoulder after falling.  She is not sure if she lost consciousness or hit her head.  She denies having palpitations shortness of breath before or after the fall.  She was found to have a right sided nondisplaced proximal humerus fracture. This was evaluated by Dr. Irwin of orthopedic surgery who recommended a sling and supportive care with no surgical intervention.  CT of cervical spine showed no acute injury and acute CT of the head showed no acute issues, only chronic post operative changes and frontal atrophy.    She was also found to have acute on chronic anemia of 6.5. She received 2 units of prbc.    Interval Problem Update  She remains axox3, she is adamant to go home today.  PT OT recommended SNF, however, patient declined and wants to go home. explained to her that we need to monitor hemoglobin and arrange home health.  May DC home if she stays stable.     Acute anemia 6.5 at admission.  Baseline hemoglobin 8s 2 months ago.  Patient denies active bleeding signs.  Per chart review, patient had EGD 2/2022 for persistent nausea vomitingwhich was negative for acute pathology or bleeding signs.  Known history of multiple bariatric surgeries.  Her hemoglobin has been stable at 8s after RBC  transfusion.  No bowel movement for past 2 days.  Occult test was not able to be done.  She received MiraLAX.  Iron study does not show iron deficiency.  B12 was normal  2 months ago    She stated she will follow-up with a psychiatrist as outpatient if she is not seen before discharge.      No numbness or tingling, no chest pain, no melena or hematochezia.     she remains on her chronic 4L.     I have personally seen and examined the patient at bedside. I discussed the plan of care with patient, nursing and case management    Consultants/Specialty    Code Status  Full Code    Disposition  Patient is not medically cleared for discharge.   Anticipate discharge to be determined    Review of Systems  Review of Systems   Constitutional: Negative.  Negative for chills, diaphoresis, fever, malaise/fatigue and weight loss.   HENT: Negative.  Negative for sore throat.    Eyes: Negative.  Negative for blurred vision.   Respiratory: Negative.  Negative for cough and shortness of breath.    Cardiovascular: Negative.  Negative for chest pain, palpitations and leg swelling.   Gastrointestinal: Negative.  Negative for abdominal pain, nausea and vomiting.   Genitourinary: Negative.  Negative for dysuria.   Musculoskeletal: Positive for falls and joint pain. Negative for myalgias and neck pain.   Skin: Negative.  Negative for itching and rash.   Neurological: Negative.  Negative for dizziness, focal weakness, weakness and headaches.   Endo/Heme/Allergies: Negative.  Does not bruise/bleed easily.   Psychiatric/Behavioral: Negative.  Negative for depression, substance abuse and suicidal ideas.   All other systems reviewed and are negative.       Physical Exam  Temp:  [36.7 °C (98 °F)-37.1 °C (98.7 °F)] 36.8 °C (98.2 °F)  Pulse:  [] 100  Resp:  [16-18] 18  BP: (120-156)/(59-68) 128/64  SpO2:  [92 %-100 %] 100 %    Physical Exam  Vitals and nursing note reviewed. Exam conducted with a chaperone present.   Constitutional:        General: She is not in acute distress.     Appearance: Normal appearance. She is not diaphoretic.   HENT:      Head: Normocephalic.      Nose: Nose normal. No congestion or rhinorrhea.      Mouth/Throat:      Mouth: Mucous membranes are moist.      Pharynx: Oropharynx is clear. No oropharyngeal exudate.   Eyes:      Pupils: Pupils are equal, round, and reactive to light.   Cardiovascular:      Rate and Rhythm: Normal rate and regular rhythm.      Pulses: Normal pulses.      Heart sounds: Normal heart sounds. No murmur heard.  Pulmonary:      Effort: Pulmonary effort is normal. No respiratory distress.      Breath sounds: Normal breath sounds. No wheezing, rhonchi or rales.   Chest:      Chest wall: No tenderness.   Abdominal:      General: Abdomen is flat. Bowel sounds are normal. There is no distension.      Palpations: Abdomen is soft. There is no mass.      Tenderness: There is no abdominal tenderness. There is no left CVA tenderness, guarding or rebound.   Musculoskeletal:         General: No swelling or deformity. Normal range of motion.      Cervical back: Normal range of motion and neck supple. No rigidity or tenderness.      Comments: Right arm in sling - sensation and pulses intact   Lymphadenopathy:      Cervical: No cervical adenopathy.   Skin:     General: Skin is warm and dry.      Capillary Refill: Capillary refill takes less than 2 seconds.      Coloration: Skin is pale. Skin is not jaundiced.      Findings: No erythema.   Neurological:      General: No focal deficit present.      Mental Status: She is alert and oriented to person, place, and time.      Cranial Nerves: No cranial nerve deficit.      Sensory: No sensory deficit.      Motor: No weakness.      Coordination: Coordination normal.   Psychiatric:         Mood and Affect: Mood normal.         Behavior: Behavior normal.         Fluids  No intake or output data in the 24 hours ending 03/22/22 1209    Laboratory  Recent Labs     03/20/22  4341  03/20/22  1021 03/21/22  0252   WBC 5.1 4.7* 4.3*   RBC 2.56* 3.02* 2.93*   HEMOGLOBIN 6.5* 8.0* 8.0*   HEMATOCRIT 21.0* 25.0* 24.9*   MCV 82.0 82.8 85.0   MCH 25.4* 26.5* 27.3   MCHC 31.0* 32.0* 32.1*   RDW 48.9 48.2 50.2*   PLATELETCT 90* 82* 92*   MPV 9.0 9.6 9.2     Recent Labs     03/19/22  1415 03/20/22  0053 03/21/22  0252   SODIUM 131* 135 137   POTASSIUM 4.4 4.1 4.1   CHLORIDE 88* 98 100   CO2 20 26 26   GLUCOSE 288* 221* 139*   BUN 33* 34* 21   CREATININE 1.46* 1.44* 1.01   CALCIUM 8.3* 7.6* 8.3*                   Imaging  EC-ECHOCARDIOGRAM COMPLETE W/O CONT   Final Result      CT-HEAD W/O   Final Result      1.  There is no acute intracranial hemorrhage.   2.  Stable postoperative changes of left frontal parietal craniotomy with adjacent underlying dural thickening.   3.  Stable mild frontal atrophy.         CT-CSPINE WITHOUT PLUS RECONS   Final Result      No acute fracture or dislocation seen in the CT scan of the cervical spine.      DX-WRIST-COMPLETE 3+ RIGHT   Final Result      1.  There is no acute fracture or malalignment of the right wrist. There is minimal degenerative change in the wrist.      DX-ELBOW-LIMITED 2- RIGHT   Final Result      1.  No acute fracture or dislocation.      2.  Evidence of previous ulnar fracture is noted.      DX-SHOULDER 2+ RIGHT   Final Result      1.  There is a comminuted multipart fracture of the right lateral head and neck.           Assessment/Plan  Syncope- (present on admission)  Assessment & Plan  EKG shows sinus tachycardia with a rate of 97, there is no ST elevation, there is no significant ST depression, QTc 483.  No significant findings on tele   Echocardiography revealed no significant findings  Known hx of recent SDH post craniotomy leading to a seizure disorder    Continue seizure meds and tele monitor    Closed fracture of proximal end of humerus- (present on admission)  Assessment & Plan  Imaging shows evidence for comminuted multipart fracture of the  right lateral head and neck  See above  Non surgical per orthopedic surgery  Sling supportive care  Following  PT/ OT    Lactic acidosis- (present on admission)  Assessment & Plan  Initial lactic acid of 2.9, likely secondary to reduced intravascular volume.  Now resolved    Anemia requiring a blood transfusion- (present on admission)  Assessment & Plan  No evidence of gross bleeding.  Acute on chronic  Acute anemia 6.5 at admission.  Baseline hemoglobin 8s 2 months ago.   Transfused prbc 3/19  Per chart review, patient had EGD 2/2022 for persistent nausea vomitingwhich was negative for acute pathology or bleeding signs.  Known history of multiple bariatric surgeries.    Her hemoglobin has been stable at 8s after RBC transfusion.  Occult test was not able to be done due to no bowel movement for past 2 days.  .   Iron study does not show iron deficiency.  B12 was normal  2 months ago    Transfuse for a hemoglobin of less than or equal to 7     Continue to follow    Hypocalcemia- (present on admission)  Assessment & Plan  ionized ca within normal limits      SDH (subdural hematoma) (HCC)- (present on admission)  Assessment & Plan  Status post recent craniotomy leading to a seizure disorder  continue seizure medications  Seizure precautions    Hyperglycemia- (present on admission)  Assessment & Plan  History of diabetes when she was 100 pounds heavier  Continue sliding scale   glycated hemoglobin pending  Accu-Checks, hypoglycemia protocol     Thrombocytopenia (HCC)  Assessment & Plan  Chronic  Related to liver disease and transplant  In stable range  Follow intermittently    IRMA (acute kidney injury) (HCC)- (present on admission)  Assessment & Plan  Consistent dehydration on admit - resolved, stable off of iv hydration  Home diuretics held - following and will restart when appropriate - today euvolemic on exam  following    Recurrent major depressive disorder, in remission (HCC)- (present on admission)  Assessment &  Plan  continue home amitriptyline and sertraline  Patient reports depression is stable    GERD (gastroesophageal reflux disease)- (present on admission)  Assessment & Plan  continue omeprazole    Chronic respiratory failure (HCC)- (present on admission)  Assessment & Plan  On 4L at baseline  No evidence of exacerbation  following    DCU (generalized anxiety disorder)- (present on admission)  Assessment & Plan  See above  Depression and overwhelm related to health issues contributing  Psychology consulted    Hypothyroidism- (present on admission)  Assessment & Plan  Resume home levothyroxine    Status post liver transplant Dr. [place] ([facility])- (present on admission)  Assessment & Plan  Continue chronic immunosuppressive medications  Has follow up next week planned       VTE prophylaxis: SCDs/TEDs    I have performed a physical exam and reviewed and updated ROS and Plan today (3/22/2022). In review of yesterday's note (3/21/2022), there are no changes except as documented above.

## 2022-03-22 NOTE — PROGRESS NOTES
Telemetry Shift Summary     Rhythm: SR   HR: 80-84  Ectopy: rPVC    Measurements: 0.14/0.08/0.36    Normal Values  Rhythm: SR  HR:   Measurements: 0.12-0.20/0.08-0.10/0.30-0.52

## 2022-03-22 NOTE — DISCHARGE PLANNING
Received Choice form at 1243  Agency/Facility Name: [facility]  Referral sent per Choice form @ [phone]  Agency/Facility Name: [facility]  Outcome: Pt declined    Received Choice form at 1521  Agency/Facility Name: [facility]   Referral sent per Choice form @ 2639

## 2022-03-22 NOTE — CARE PLAN
The patient is Watcher - Medium risk of patient condition declining or worsening    Shift Goals  Clinical Goals: pain control  Patient Goals: pain control, sleep    Progress made toward(s) clinical / shift goals:    Problem: Pain - Standard  Goal: Alleviation of pain or a reduction in pain to the patient’s comfort goal  Outcome: Progressing  Note: Pt will have adequate pain control during hospitalization     Problem: Fall Risk  Goal: Patient will remain free from falls  Outcome: Progressing  Note: Pt will have no falls during hospitalization.       Patient is not progressing towards the following goals:

## 2022-03-22 NOTE — PROGRESS NOTES
Tele strip at 1359 shows sinus rhythm.      Measurements from am strip were as follows:  PA=.14  QRS=.08  QT=.38      Tele Shift Summary:    Rhythm : Sinus Rhythm  Rate : 81  Ectopy : Per CCT Ajna, pt had Rare PVC.     Telemetry monitoring strips placed in pt's chart.

## 2022-03-22 NOTE — FACE TO FACE
Face to Face Supporting Documentation - Home Health    The encounter with this patient was in whole or in part the primary reason for home health admission.    Date of encounter:   Patient:                    MRN:                       YOB: 2022  Roxana Cuba  [id number]  1960     Home health to see patient for:  Skilled Nursing care for assessment, interventions & education, Home health aide, Physical Therapy evaluation and treatment and Occupational therapy evaluation and treatment    Skilled need for:  Comment: debility    Skilled nursing interventions to include:  Comment: pt ot    Homebound status evidenced by:  Have a condition such that leaving his or her home is medically contraindicated. Leaving home requires a considerable and taxing effort. There is a normal inability to leave the home.    Community Physician to provide follow up care: Elisa Phillip M.D.     Optional Interventions? No      I certify the face to face encounter for this home health care referral meets the CMS requirements and the encounter/clinical assessment with the patient was, in whole, or in part, for the medical condition(s) listed above, which is the primary reason for home health care. Based on my clinical findings: the service(s) are medically necessary, support the need for home health care, and the homebound criteria are met.  I certify that this patient has had a face to face encounter by myself.  Monse Ac M.D. - NPI: [id number]

## 2022-03-22 NOTE — DISCHARGE PLANNING
ATTN: Case Management  RE: Referral for Home Health    Reason for referral denial: Patient needs higher level of care               Unfortunately, we are not able to accept this referral for the reason listed above. If further clarity is needed, our Transitional Care Specialists are available to discuss any barriers to service at [phone].      We look forward to collaborating with you in the future,  [facility] Team

## 2022-03-22 NOTE — TELEPHONE ENCOUNTER
ESTABLISHED PATIENT PRE-VISIT PLANNING     Patient was NOT contacted to complete PVP.     Note: Patient will not be contacted if there is no indication to call.     1.  Reviewed notes from the last few office visits within the medical group: Yes    2.  If any orders were placed at last visit or intended to be done for this visit (i.e. 6 mos follow-up), do we have Results/Consult Notes?         •  Labs - Labs were not ordered at last office visit.  Note: If patient appointment is for lab review and patient did not complete labs, check with provider if OK to reschedule patient until labs completed.       •  Imaging - Imaging was not ordered at last office visit.       •  Referrals - No referrals were ordered at last office visit.    3. Is this appointment scheduled as a Hospital Follow-Up? Yes, visit was at [facility].     4.  Immunizations were updated in Epic using Reconcile Outside Information activity? Yes    5.  Patient is due for the following Health Maintenance Topics:   Health Maintenance Due   Topic Date Due   • Annual Wellness Visit  Never done   • IMM ZOSTER VACCINES (2 of 2) 01/29/2019         6.  AHA (Pulse8) form printed for Provider? Yes

## 2022-03-22 NOTE — DISCHARGE PLANNING
Anticipated Discharge Disposition: Home with HH    Action: LSW completed chart review. Per chart review, PT/OT recommended post acute placement.     LSW met with pt at bedside to discuss d/c planning. Pt states she lives in one story home, no stairs, and has walker at home. Pt has prescription coverage. Pt states she uses oxygen at home with [facility] (baseline 4L). Pt states she will have a ride home from the hospital.    LSW explained therapy team recommended pa placement. Pt refused SNF and states she wants to return home. Pt is open to HH and has had [facility] in the past. Pt is agreeable to sending a referral to [facility].    LSW messaged MD for HH order.    Barriers to Discharge: HH order    Plan: LSW to follow and assist as needed. LSW to send HH choice to [facility] when order is placed.    1242: Per chart review, MD placed HH order and face to face. LSW faxed choice form to [facility].    1520: LSW informed that [facility] declined pt. LSW faxed HH choice for [facility] to [facility] per continuity of care as [facility] the only [facility] that accepts [facility].      Care Transition Team Assessment    Information Source  Orientation Level: Oriented X4  Information Given By: Patient  Informant's Name: Roxana  Who is responsible for making decisions for patient? : Patient    Readmission Evaluation  Is this a readmission?: No    Elopement Risk  Legal Hold: No  Ambulatory or Self Mobile in Wheelchair: Yes  Disoriented: No  Psychiatric Symptoms: None  History of Wandering: No  Elopement this Admit: No  Vocalizing Wanting to Leave: No  Displays Behaviors, Body Language Wanting to Leave: No-Not at Risk for Elopement  Elopement Risk: Not at Risk for Elopement    Interdisciplinary Discharge Planning  Primary Care Physician: Elisa Phillip MD  Lives with - Patient's Self Care Capacity: Spouse  Patient or legal guardian wants to designate a caregiver: No  Support Systems: Friends / Neighbors,Spouse / Significant Other  Housing / Facility: 1 Story Apartment /  [place]  Prior Services: Continuous (24 Hour) Care Giving Family  Durable Medical Equipment: Home Oxygen  DME Provider / Phone: [facility] (baseline 4L)    Discharge Preparedness  What is your plan after discharge?: Home health care  What are your discharge supports?: Spouse,Child  Prior Functional Level: Ambulatory    Functional Assesment  Prior Functional Level: Ambulatory    Finances  Financial Barriers to Discharge: No  Prescription Coverage: Yes    Vision / Hearing Impairment  Vision Impairment : Yes  Right Eye Vision: Impaired  Left Eye Vision: Impaired  Hearing Impairment : No         Advance Directive  Advance Directive?: DPOA for Health Care  Durable Power of  Name and Contact : Otis Cuba [phone]    Domestic Abuse  Have you ever been the victim of abuse or violence?: No  Physical Abuse or Sexual Abuse: No  Verbal Abuse or Emotional Abuse: No  Possible Abuse/Neglect Reported to:: Not Applicable    Psychological Assessment  History of Substance Abuse: None    Discharge Risks or Barriers  Discharge risks or barriers?: No  Patient risk factors: Complex medical needs    Anticipated Discharge Information  Discharge Disposition: D/T to home under HHA care in anticipation of covered skilled care (06)

## 2022-03-22 NOTE — PROGRESS NOTES
Tele strip at 1352 shows sinus rhythm     Measurements from am strip were as follows:  HI=.14  QRS=.08  QT=.34     Tele Shift Summary:     Rhythm : sinus rhythm  Rate : 82  Ectopy : rare PVC     Telemetry monitoring strips placed in pt's chart.

## 2022-03-23 NOTE — PROGRESS NOTES
Telemetry Shift Summary     Rhythm: SR  HR: 74-94  Ectopy: rPAC,rPVC,rTrigem    Measurements: 0.16/0.08/0.36    Normal Values  Rhythm: SR  HR:   Measurements: 0.12-0.20/0.08-0.10/0.30-0.52

## 2022-03-23 NOTE — CARE PLAN
The patient is Watcher - Medium risk of patient condition declining or worsening    Shift Goals  Clinical Goals: manage pain, rest comfortably  Patient Goals: leave hospital    Progress made toward(s) clinical / shift goals:        Problem: Pain - Standard  Goal: Alleviation of pain or a reduction in pain to the patient’s comfort goal  Outcome: Progressing     Problem: Knowledge Deficit - Standard  Goal: Patient and family/care givers will demonstrate understanding of plan of care, disease process/condition, diagnostic tests and medications  Outcome: Progressing     Problem: Skin Integrity  Goal: Skin integrity is maintained or improved  Outcome: Progressing     Problem: Fall Risk  Goal: Patient will remain free from falls  Outcome: Progressing

## 2022-03-23 NOTE — PROGRESS NOTES
Received bedside report from PENNY Lindo, pt care assumed. VSS, pt assessment complete. Pt A&Ox4, no c/o pain at this time. POC discussed with pt and verbalizes no questions. Pt denies any additional needs at this time. Bed locked and in lowest position, bed alarm activated. Pt educated on fall risk and verbalized understanding, call light within reach, hourly rounding initiated.

## 2022-03-23 NOTE — PROGRESS NOTES
[facility] Medicine Daily Progress Note    Date of Service  3/23/2022    Chief Complaint  Roxana Cuba is a 62 y.o. 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 She presented to [facility] on 3/19/2022 with generalized weakness and right shoulder pain, neck pain after a fall.  Patient reported that she has severe, 10 out of 10 pain in the right shoulder after falling.  She is not sure if she lost consciousness or hit her head.  She denies having palpitations shortness of breath before or after the fall.  She was found to have a right sided nondisplaced proximal humerus fracture. This was evaluated by Dr. Irwin of orthopedic surgery who recommended a sling and supportive care with no surgical intervention.  CT of cervical spine showed no acute injury and acute CT of the head showed no acute issues, only chronic post operative changes and frontal atrophy.    Acute anemia 6.5 at admission.  Baseline hemoglobin 8s 2 months ago.  Patient denies active bleeding signs.  Per chart review, patient had EGD 2/2022 for persistent nausea vomitingwhich was negative for acute pathology or bleeding signs.  Known history of multiple bariatric surgeries.  Her hemoglobin has been stable at 8s after RBC transfusion.      Interval Problem Update  She remains axox3, she is adamant to go home today.  PT OT recommended SNF, however, patient declined and wants to go home. explained to her that we need to monitor hemoglobin and arrange home health.  May DC home if she stays stable.     Hemoglobin dropped to 6.7 this a.m., received 1 unit RBC  No bowel movement for 3  days.  Continue bowel management, suppository was given.  Pending occult stool test.  Will consult GI if it came back positive    Psychiatry consulted for unstable mood.  Recurrent major depressive disorder.  Psych recommended to continue current medications.  Continue to follow.     she remains on her chronic 4L.   [place] SN[facility] vs     I have personally seen and examined the patient at bedside. I discussed the plan of care with patient, nursing and case management    Consultants/Specialty    Code Status  Full Code    Disposition  Patient is not medically cleared for discharge.   Anticipate discharge to be determined    Review of Systems  Review of Systems   Constitutional: Negative.  Negative for chills, diaphoresis, fever, malaise/fatigue and weight loss.   HENT: Negative.  Negative for sore throat.    Eyes: Negative.  Negative for blurred vision.   Respiratory: Negative.  Negative for cough and shortness of breath.    Cardiovascular: Negative.  Negative for chest pain, palpitations and leg swelling.   Gastrointestinal: Negative.  Negative for abdominal pain, nausea and vomiting.   Genitourinary: Negative.  Negative for dysuria.   Musculoskeletal: Positive for falls and joint pain. Negative for myalgias and neck pain.   Skin: Negative.  Negative for itching and rash.   Neurological: Negative.  Negative for dizziness, focal weakness, weakness and headaches.   Endo/Heme/Allergies: Negative.  Does not bruise/bleed easily.   Psychiatric/Behavioral: Negative.  Negative for depression, substance abuse and suicidal ideas.   All other systems reviewed and are negative.       Physical Exam  Temp:  [36.2 °C (97.1 °F)-37.1 °C (98.7 °F)] 36.5 °C (97.7 °F)  Pulse:  [] 90  Resp:  [14-16] 16  BP: (108-145)/(51-76) 121/62  SpO2:  [95 %-100 %] 97 %    Physical Exam  Vitals and nursing note reviewed. Exam conducted with a chaperone present.   Constitutional:       General: She is not in acute distress.     Appearance: Normal  appearance. She is not diaphoretic.   HENT:      Head: Normocephalic.      Nose: Nose normal. No congestion or rhinorrhea.      Mouth/Throat:      Mouth: Mucous membranes are moist.      Pharynx: Oropharynx is clear. No oropharyngeal exudate.   Eyes:      Pupils: Pupils are equal, round, and reactive to light.   Cardiovascular:      Rate and Rhythm: Normal rate and regular rhythm.      Pulses: Normal pulses.      Heart sounds: Normal heart sounds. No murmur heard.  Pulmonary:      Effort: Pulmonary effort is normal. No respiratory distress.      Breath sounds: Normal breath sounds. No wheezing, rhonchi or rales.   Chest:      Chest wall: No tenderness.   Abdominal:      General: Abdomen is flat. Bowel sounds are normal. There is no distension.      Palpations: Abdomen is soft. There is no mass.      Tenderness: There is no abdominal tenderness. There is no left CVA tenderness, guarding or rebound.   Musculoskeletal:         General: No swelling or deformity. Normal range of motion.      Cervical back: Normal range of motion and neck supple. No rigidity or tenderness.      Comments: Right arm in sling - sensation and pulses intact   Lymphadenopathy:      Cervical: No cervical adenopathy.   Skin:     General: Skin is warm and dry.      Capillary Refill: Capillary refill takes less than 2 seconds.      Coloration: Skin is pale. Skin is not jaundiced.      Findings: No erythema.   Neurological:      General: No focal deficit present.      Mental Status: She is alert and oriented to person, place, and time.      Cranial Nerves: No cranial nerve deficit.      Sensory: No sensory deficit.      Motor: No weakness.      Coordination: Coordination normal.   Psychiatric:         Mood and Affect: Mood normal.         Behavior: Behavior normal.         Fluids    Intake/Output Summary (Last 24 hours) at 3/23/2022 1616  Last data filed at 3/23/2022 1313  Gross per 24 hour   Intake 510 ml   Output 100 ml   Net 410 ml        Laboratory  Recent Labs     03/21/22  0252 03/23/22  0238   WBC 4.3* 2.8*   RBC 2.93* 2.51*   HEMOGLOBIN 8.0* 6.7*   HEMATOCRIT 24.9* 21.7*   MCV 85.0 86.5   MCH 27.3 26.7*   MCHC 32.1* 30.9*   RDW 50.2* 49.7   PLATELETCT 92* 111*   MPV 9.2 9.3     Recent Labs     03/21/22  0252 03/23/22  0238   SODIUM 137 137   POTASSIUM 4.1 4.2   CHLORIDE 100 102   CO2 26 29   GLUCOSE 139* 99   BUN 21 14   CREATININE 1.01 0.77   CALCIUM 8.3* 8.6     Recent Labs     03/22/22  1420   INR 0.92               Imaging  EC-ECHOCARDIOGRAM COMPLETE W/O CONT   Final Result      CT-HEAD W/O   Final Result      1.  There is no acute intracranial hemorrhage.   2.  Stable postoperative changes of left frontal parietal craniotomy with adjacent underlying dural thickening.   3.  Stable mild frontal atrophy.         CT-CSPINE WITHOUT PLUS RECONS   Final Result      No acute fracture or dislocation seen in the CT scan of the cervical spine.      DX-WRIST-COMPLETE 3+ RIGHT   Final Result      1.  There is no acute fracture or malalignment of the right wrist. There is minimal degenerative change in the wrist.      DX-ELBOW-LIMITED 2- RIGHT   Final Result      1.  No acute fracture or dislocation.      2.  Evidence of previous ulnar fracture is noted.      DX-SHOULDER 2+ RIGHT   Final Result      1.  There is a comminuted multipart fracture of the right lateral head and neck.           Assessment/Plan  Syncope- (present on admission)  Assessment & Plan  EKG shows sinus tachycardia with a rate of 97, there is no ST elevation, there is no significant ST depression, QTc 483.  No significant findings on tele   Echocardiography revealed no significant findings  Known hx of recent SDH post craniotomy leading to a seizure disorder    Continue seizure meds and tele monitor    Closed fracture of proximal end of humerus- (present on admission)  Assessment & Plan  Imaging shows evidence for comminuted multipart fracture of the right lateral head and  neck  See above  Non surgical per orthopedic surgery  Sling supportive care  Following  PT/ OT    Lactic acidosis- (present on admission)  Assessment & Plan  Initial lactic acid of 2.9, likely secondary to reduced intravascular volume.  Now resolved    Anemia requiring a blood transfusion- (present on admission)  Assessment & Plan  No evidence of gross bleeding.  Acute on chronic  Acute anemia 6.5 at admission.  Baseline hemoglobin 8s 2 months ago.   Transfused prbc 3/19  Per chart review, patient had EGD 2/2022 for persistent nausea vomitingwhich was negative for acute pathology or bleeding signs.  Known history of multiple bariatric surgeries.    Her hemoglobin has been stable at 8s after RBC transfusion.  Occult test was not able to be done due to no bowel movement for past 2 days.  .   Iron study does not show iron deficiency.  B12 was normal  2 months ago    Transfuse for a hemoglobin of less than or equal to 7     Continue to follow    Hypocalcemia- (present on admission)  Assessment & Plan  ionized ca within normal limits      SDH (subdural hematoma) (HCC)- (present on admission)  Assessment & Plan  Status post recent craniotomy leading to a seizure disorder  continue seizure medications  Seizure precautions    Hyperglycemia- (present on admission)  Assessment & Plan  History of diabetes when she was 100 pounds heavier  Continue sliding scale   glycated hemoglobin pending  Accu-Checks, hypoglycemia protocol     Thrombocytopenia (HCC)  Assessment & Plan  Chronic  Related to liver disease and transplant  In stable range  Follow intermittently    IRMA (acute kidney injury) (HCC)- (present on admission)  Assessment & Plan  Consistent dehydration on admit - resolved, stable off of iv hydration  Home diuretics held - following and will restart when appropriate - today euvolemic on exam  following    Recurrent major depressive disorder, in remission (HCC)- (present on admission)  Assessment & Plan  continue home  amitriptyline and sertraline    Psychiatry consulted for unstable mood.    Psych recommended to continue current medications.  Continue to follow.     GERD (gastroesophageal reflux disease)- (present on admission)  Assessment & Plan  continue omeprazole    Chronic respiratory failure (HCC)- (present on admission)  Assessment & Plan  On 4L at baseline  No evidence of exacerbation  following    DUC (generalized anxiety disorder)- (present on admission)  Assessment & Plan  See above  Depression and overwhelm related to health issues contributing  Psychology consulted    Hypothyroidism- (present on admission)  Assessment & Plan  Resume home levothyroxine    Status post liver transplant Dr. Canada ([facility])- (present on admission)  Assessment & Plan  Continue chronic immunosuppressive medications  Has follow up next week planned       VTE prophylaxis: SCDs/TEDs    I have performed a physical exam and reviewed and updated ROS and Plan today (3/23/2022). In review of yesterday's note (3/22/2022), there are no changes except as documented above.

## 2022-03-23 NOTE — CONSULTS
"[facility] INPATIENT PSYCHIATRIC CONSULT LIAISON NOTE: INTAKE     DOS: 03/23/2022     REASON FOR CONSULT:  Medication management    CC: “I am in a lot of pain right now”     HPI: Per chart review: \"Roxana Cuba is a 62 y.o. 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  Patient reports that she has severe, 10 out of 10 pain in the right shoulder after falling.  She is not sure if she lost consciousness or hit her head.  She denies having palpitations shortness of breath before or after the fall.  Pain is worse with attempt to move the shoulder.  The pain radiates to the right side of the neck.  No relieving factors for the pain\"       Patient was seen today as a new consult. Patient is very tearful states “I am in a lot of pain right now. I just got my pain medicine and will prefer to talk tomorrow; my  just walked in and I will like to visit with him.   Per patients nurse, patient had a subdural hematoma in 2021, per her  she has been labile since then, gets intermittently confused/delirius;  Has been very depressed and anxious because her  is currently unable to care for her at home, she does not want to go to SNF.    Reviewed chart for current psychiatric medications and dosages.     Discussed patient’s preference and plan of care with his nurse. Will visit with patient tomorrow.    Discussed plan of care with Dr. Monse Ac via [employer].    LEGAL HOLD: Not applicable     ALLERGIES:       Allergies   Allergen Reactions   • Nsaids Unspecified       Can not take due to hx of liver transplant    • Rhubarb Anaphylaxis   • " "Organic Nitrates Unspecified       Nitroglycerin products should be avoided with the use of PDE-5 inhibitors as the combination can result in severe hypotension.  RD clarified with pt: Nitrates in food are okay and pt does not want nitrates to be listed as food allergy. Pt only avoids medications with nitrates.    • Other Drug         Any medication that may interact with cyclosporine, tacrolimus, sirolimus, or prograf (due to hx of liver transplant)        PAST MEDICAL HISTORY:     Past Medical History:   Diagnosis Date   • Anemia     • Anesthesia       \"takes more to get me under, would rather be intubated\"    • Breath shortness       cont oxygen 5L (Preffered)   • Bronchitis       2016   • Cardiomegaly     • Chronic fatigue and malaise     • Chronic obstructive pulmonary disease (HCC)     • Cirrhosis (HCC) December 2011     Status post liver transplant at [facility]   • CKD (chronic kidney disease) stage 3, GFR 30-59 ml/min (HCC)     • Diabetes (HCC)       reports hx of, resolved w/weight loss. Reports still checking bloodsugars twice daily and using insulin PRN   • Fracture of left foot     • GERD (gastroesophageal reflux disease)           • H/O Clostridium difficile infection 02-17-17     reports \"16 months ago\". Current stool sample 01-26-17 neg   • Hemorrhagic disorder (HCC)       \"low platelets\" bruises easily    • Hiatus hernia syndrome     • High cholesterol     • Hypothyroid     • Jaundice 2009   • Liver transplanted (HCC)     • Low back pain 02-17-17     and left foot, 7/10   • Mild aortic regurgitation and aortic valve sclerosis     • On home oxygen therapy       5 liters, Dr. Gaming   • Platelet disorder ([facility])       low platelets   • Pneumonia       aspiration,    • Psychiatric disorder       Mood disorder   • Pulmonary hypertension (HCC)          • S/P cholecystectomy     • Small bowel obstruction (HCC)       01-   • Splenomegaly     • VRE (vancomycin-resistant Enterococci)       " "02-17-17, pt declines knowledge of this              PAST PSYCHIATRIC HISTORY:     Psychiatric disorder   Mood disorder        LEGAL HISTORY:  Denies     SOCIAL HISTORY:     Socioeconomic History   • Marital status:    Tobacco Use   • Smoking status: Never Smoker   • Smokeless tobacco: Never Used   • Tobacco comment: avoid all tobacco products   Vaping Use   • Vaping Use: Never used   Substance and Sexual Activity   • Alcohol use: Not Currently       Alcohol/week: 0.0 oz   • Drug use: No              CURRENT HOSPITAL PROBLEMS:     IRMA (acute kidney injury) ([facility])   Anemia requiring a blood transfusion   Chronic respiratory failure ([facility])   Closed fracture of proximal end of humerus   DUC (generalized anxiety disorder)   GERD (gastroesophageal reflux disease)   Hyperglycemia   Hypocalcemia   Hypothyroidism   Lactic acidosis   Recurrent major depressive disorder, in remission ([facility])   SDH (subdural hematoma) ([facility])   Status post liver transplant Dr. Canada ([facility])   Syncope   Thrombocytopenia ([facility])              PSYCHIATRIC EXAMNIATION:  VITALS: WNL     MENTAL STATUS EXAM:  Appearance: Dressed in hospital gown, normal grooming and hygiene.  Attitude: Tearful, irritable  Behavior: Tearful, irritable, Labile mood   Musculoskeletal: Unremarkable, patient in bed  Eye Contact: Adequate.  Speech:  coherent, adequate volume  Affect: Depressed, sad, tearful  Mood: \"I am in terrible pain right now\"  Process: Goal-directed, organized. Linear  Content: Unable to assess  Perception: Unable to assess  Orientation: Oriented to Time, Place, Person and Self.  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   Generalized Anxiety " Disorder      Legal Hold: Voluntary  -Recommend to continue current psychiatric medications.   -Psych CL will continue following patient while at [facility].  -Medication reconciliation was completed.  -Reviewed safety plan - 911, ER, PCM, MHC, Suicide Crisis Line, Nursing staff while    inpatient.  -Visitors: Yes  -Personal Belongings: Yes  -Observation Level: Tele monitor    -Instruction: Discharge recommendations per treatment team

## 2022-03-23 NOTE — CONSULTS
"3/22/2022         The patient was seen at the request of Dr Ac    HPI: Roxana Cuba is a 62 y.o. female who presents with complaints of pain to right shoulder.  This started 3/18 after fall.  The pain is 4/10 and is described as sharp.  The pain is made worse by palpation of the area and made better by rest and immobilization.    Past Medical History:   Diagnosis Date   • Anemia    • Anesthesia     \"takes more to get me under, would rather be intubated\"    • Breath shortness     cont oxygen 5L (Preffered)   • Bronchitis      2016   • Cardiomegaly    • Chronic fatigue and malaise    • Chronic obstructive pulmonary disease (HCC)    • Cirrhosis (HCC) December 2011    Status post liver transplant at [facility]   • CKD (chronic kidney disease) stage 3, GFR 30-59 ml/min ([facility])    • Diabetes ([facility])     reports hx of, resolved w/weight loss. Reports still checking bloodsugars twice daily and using insulin PRN   • Fracture of left foot    • GERD (gastroesophageal reflux disease)         • H/O Clostridium difficile infection 02-17-17    reports \"16 months ago\". Current stool sample 01-26-17 neg   • Hemorrhagic disorder (HCC)     \"low platelets\" bruises easily    • Hiatus hernia syndrome    • High cholesterol    • Hypothyroid    • Jaundice 2009   • Liver transplanted ([facility])    • Low back pain 02-17-17    and left foot, 7/10   • Mild aortic regurgitation and aortic valve sclerosis     • On home oxygen therapy     5 liters, Dr. Gaming   • Platelet disorder ([facility])     low platelets   • Pneumonia     aspiration,    • Psychiatric disorder     Mood disorder   • Pulmonary hypertension ([facility])        • S/P cholecystectomy    • Small bowel obstruction ([facility])     01-   • Splenomegaly    • VRE (vancomycin-resistant Enterococci)     02-17-17, pt declines knowledge of this       Past Surgical History:   Procedure Laterality Date   • AR UPPER GI ENDOSCOPY,DIAGNOSIS N/A 2/3/2022    Procedure: GASTROSCOPY;  Surgeon: Aravind iRchards, " M.D.;  Location: SURGERY SAME DAY [facility];  Service: Gastroenterology   • CRANIOTOMY Left 8/4/2021    Procedure: CRANIOTOMY;  Surgeon: Tramaine Arnold III, M.D.;  Location: SURGERY [facility];  Service: Neurosurgery   • VENTRAL HERNIA REPAIR ROBOTIC XI N/A 11/12/2018    Procedure: VENTRAL HERNIA REPAIR ROBOTIC XI- FOR EPIGASTRIC INCISIONAL;  Surgeon: John H Ganser, M.D.;  Location: SURGERY [facility];  Service: General   • TURBINATE REDUCTION Bilateral 10/4/2018    Procedure: TURBINATE REDUCTION;  Surgeon: Silviano Erazo M.D.;  Location: SURGERY SAME DAY [facility];  Service: Ent   • NASAL RECONSTRUCTION Bilateral 10/4/2018    Procedure: NASAL RECONSTRUCTION- LATERA IMPLANTS;  Surgeon: Silviano Erazo M.D.;  Location: SURGERY SAME DAY [facility];  Service: Ent   • OTHER  12/11/2017    paniculectomy   • COLONOSCOPY N/A 3/27/2017    Procedure: COLONOSCOPY;  Surgeon: Osman Matt M.D.;  Location: SURGERY SAME DAY [facility];  Service:    • GASTROSCOPY N/A 3/27/2017    Procedure: GASTROSCOPY;  Surgeon: Osman Matt M.D.;  Location: SURGERY SAME DAY [facility];  Service:    • BREAST BIOPSY Left 2/21/2017    Procedure: BREAST BIOPSY - WIRE LOCALIZED EXCISONAL ;  Surgeon: Jane Zhao M.D.;  Location: SURGERY SAME DAY [facility];  Service:    • LUNG BIOPSY OPEN  11/2016   • OTHER ABDOMINAL SURGERY      Gasric Sleeve   • BONE MARROW ASPIRATION  3/16/2015    Performed by Freddie Hi M.D. at ENDOSCOPY [facility]   • BONE MARROW BIOPSY, NDL/TROCAR  3/16/2015    Performed by Freddie Hi M.D. at ENDOSCOPY [facility]   • RECOVERY  3/31/2014    Performed by [facility] at SURGERY [facility]   • RECOVERY  3/24/2014    Performed by [facility] at SURGERY SAME DAY [facility]   • RECOVERY  1/21/2014    Performed by [facility] at SURGERY SAME DAY [facility]   • BRONCHOSCOPY-ENDO  11/15/2013    Performed by Ha Perez M.D. at [facility]   • RECOVERY  2/27/2013    Performed by [facility] at SURGERY SAME DAY [facility]   • BONE MARROW ASPIRATION  12/31/2012    Performed by Josemanuel Real M.D. at ENDOSCOPY [facility]   • BONE MARROW BIOPSY, NDL/TROCAR  12/31/2012    Performed by Josemanuel Real M.D. at ENDOSCOPY [facility]   • GASTROSCOPY  9/28/2012    Performed by Aravind Richards M.D. at SURGERY [facility]   • SIGMOIDOSCOPY FLEX  9/26/2012    Performed by Kristopher Cardoso M.D. at ENDOSCOPY [facility]   • BRONCHOSCOPY-ENDO  6/19/2012    Performed by MARLENA MURILLO at ENDOSCOPY [facility]   • BRONCHOSCOPY-ENDO  5/29/2012    Performed by SUYAPA CAMARENA at ENDOSCOPY [facility]   • OTHER ABDOMINAL SURGERY  December 2011    Liver transplant at [facility] by Dr. Canada.   • GASTROSCOPY-ENDO  3/12/2010    Performed by CAMELIA SAMANO at ENDOSCOPY [facility]   • EXAM UNDER ANESTHESIA  11/11/2009    Performed by BIRD ABDI at SURGERY [facility]   • HEMORRHOIDECTOMY  11/11/2009    Performed by BIRD ABDI at SURGERY [facility]   • KELBY BY LAPAROSCOPY  9/19/2009    Performed by BIRD ABDI at SURGERY [facility]   • CARPAL TUNNEL RELEASE          • KELBY BY LAPAROSCOPY     • GASTRIC BYPASS LAPAROSCOPIC     • HERNIA REPAIR      x3   • HYSTERECTOMY, TOTAL ABDOMINAL          • OTHER      Breast reduction   • OTHER      liver transplant   • NV ANESTH,NOSE,SINUS SURGERY      x4   • TONSILLECTOMY         Medications  No current facility-administered medications on file prior to encounter.     Current Outpatient Medications on File Prior to Encounter   Medication Sig Dispense Refill   • levetiracetam (KEPPRA) 750 MG tablet Take 750 mg by mouth 2 times a day.     • metoclopramide (REGLAN) 10 MG Tab Take 10 mg by mouth 2 times a day.     • CALCIUM-VITAMIN D PO Take 1 Tablet by mouth every evening.     • magnesium oxide (MAG-OX) 400 MG Tab tablet Take 400 mg by mouth every day.      • ondansetron (ZOFRAN ODT) 4 MG TABLET DISPERSIBLE DISSOLVE 1 TAB ON TONGUE EVERY 8 HOURS AS NEEDED FOR NAUSEA 90 Tablet 1   • levothyroxine (SYNTHROID) 137 MCG Tab Take 1 Tablet by mouth every morning on an empty stomach. Indications: Underactive Thyroid 90 Tablet 3   • lacosamide (VIMPAT) 100 MG Tab tablet Take 1 Tablet by mouth 2 times a day for 30 days. 60 Tablet 0   • Galcanezumab-gnlm (EMGALITY) 120 MG/ML Solution Auto-injector Inject 1 PEN under the skin every 4 weeks. 1 Each 5   • clonazePAM (KLONOPIN) 0.5 MG Tab Take 1 Tablet by mouth 3 times a day for 30 days. 90 Tablet 3   • amitriptyline (ELAVIL) 25 MG Tab Take 25 mg by mouth every evening.     • sertraline (ZOLOFT) 100 MG Tab Take 1.5 Tablets by mouth every day. 135 Tablet 3   • Ascorbic Acid (VITAMIN C) 500 MG Chew Tab Chew 500 mg every day. Indications: Inadequate Vitamin C     • fludrocortisone (FLORINEF) 0.1 MG Tab Take 0.1 mg by mouth every day. Indications: Blood Pressure Drop Upon Standing     • potassium chloride SA (KDUR) 20 MEQ Tab CR Take 1 Tablet by mouth every day. 90 Tablet 3   • albuterol (PROAIR HFA) 108 (90 Base) MCG/ACT Aero Soln inhalation aerosol Inhale 2 Puffs every four hours as needed for Shortness of Breath (wheezing). 8.5 g 3   • furosemide (LASIX) 20 MG Tab Take 1 Tablet by mouth every day. Indications: Edema 30 Tablet 0   • ambrisentan (LETAIRIS) 10 MG tablet TAKE 1 TABLET BY MOUTH EVERY DAY 30 Tablet 11   • tacrolimus (PROGRAF) 1 MG Cap Take 2 mg by mouth 2 times a day. Indications: Liver Transplant Recipient     • tadalafil (CIALIS) 20 MG tablet Take 1 Tablet by mouth every day. 90 Tablet 3   • omeprazole (PRILOSEC) 40 MG delayed-release capsule Take 1 Capsule by mouth every day. 90 Capsule 3   • pravastatin (PRAVACHOL) 20 MG Tab Take 1 Tablet by mouth every day. 90 Tablet 3   • mycophenolate (CELLCEPT) 250 MG Cap Take 250 mg by mouth 2 times a day. Indications: Liver Transplant Recipient     • oxyCODONE immediate release  "(ROXICODONE) 10 MG immediate release tablet Take 10 mg by mouth every 6 hours as needed for Moderate Pain. Take 1 tablet by mouth every 6-8 for moderate to severe pain     • therapeutic multivitamin-minerals (THERAGRAN-M) Tab Take 1 Tablet by mouth every day. Indications: suppliment         Allergies  Nsaids, Rhubarb, Organic nitrates, and Other drug    ROS  Right shoulder pain. All other systems were reviewed and found to be negative    Family History   Problem Relation Age of Onset   • Other Father         Unknown (dead 10 years)   • Diabetes Father    • Heart Disease Father    • Hypertension Father    • Hyperlipidemia Father    • Stroke Father    • Non-contributory Mother    • Hyperlipidemia Mother    • Alcohol/Drug Mother    • Cancer Paternal Aunt    • Alcohol/Drug Maternal Grandmother    • Alcohol/Drug Maternal Grandfather        Social History     Socioeconomic History   • Marital status:    Tobacco Use   • Smoking status: Never Smoker   • Smokeless tobacco: Never Used   • Tobacco comment: avoid all tobacco products   Vaping Use   • Vaping Use: Never used   Substance and Sexual Activity   • Alcohol use: Not Currently     Alcohol/week: 0.0 oz   • Drug use: No       Physical Exam  Vitals  /67   Pulse 80   Temp 36.7 °C (98.1 °F) (Oral)   Resp 18   Ht 1.753 m (5' 9\")   Wt 62.9 kg (138 lb 10.7 oz)   SpO2 100%   General: Well Developed, Well Nourished, Age appropriate appearance  HEENT: Normocephalic, atraumatic  Psych: Normal mood and affect  Neck: Supple, nontender, no masses  Lungs: Breathing unlabored, No audible wheezing  Heart: Regular heart rate and rhythm  Abdomen: Soft, NT, ND  Neuro: Sensation grossly intact to BUE and BLE, moving all four extremities  Skin: Intact, no open wounds  Vascular: 2+Rad/Uln, Capillary refill <2 seconds  MSK: Right shoulder pain with ROM, NVI      Radiographs:  EC-ECHOCARDIOGRAM COMPLETE W/O CONT   Final Result      CT-HEAD W/O   Final Result      1.  There is " no acute intracranial hemorrhage.   2.  Stable postoperative changes of left frontal parietal craniotomy with adjacent underlying dural thickening.   3.  Stable mild frontal atrophy.         CT-CSPINE WITHOUT PLUS RECONS   Final Result      No acute fracture or dislocation seen in the CT scan of the cervical spine.      DX-WRIST-COMPLETE 3+ RIGHT   Final Result      1.  There is no acute fracture or malalignment of the right wrist. There is minimal degenerative change in the wrist.      DX-ELBOW-LIMITED 2- RIGHT   Final Result      1.  No acute fracture or dislocation.      2.  Evidence of previous ulnar fracture is noted.      DX-SHOULDER 2+ RIGHT   Final Result      1.  There is a comminuted multipart fracture of the right lateral head and neck.          Laboratory Values  Recent Labs     03/20/22  0053 03/20/22  1021 03/21/22  0252   WBC 5.1 4.7* 4.3*   RBC 2.56* 3.02* 2.93*   HEMOGLOBIN 6.5* 8.0* 8.0*   HEMATOCRIT 21.0* 25.0* 24.9*   MCV 82.0 82.8 85.0   MCH 25.4* 26.5* 27.3   MCHC 31.0* 32.0* 32.1*   RDW 48.9 48.2 50.2*   PLATELETCT 90* 82* 92*   MPV 9.0 9.6 9.2     Recent Labs     03/20/22  0053 03/21/22  0252   SODIUM 135 137   POTASSIUM 4.1 4.1   CHLORIDE 98 100   CO2 26 26   GLUCOSE 221* 139*   BUN 34* 21     Recent Labs     03/22/22  1420   INR 0.92         Impression:Nondisplaced right proximal humerus fracture    Plan:No surgery required. Sling for comfort. PT for shoulder ROM when able. WBAT

## 2022-03-23 NOTE — CARE PLAN
The patient is Stable - Low risk of patient condition declining or worsening    Shift Goals  Clinical Goals: improved sleep  Patient Goals: pain control    Progress made toward(s) clinical / shift goals:  Encouraged pt to turn light and tv off; good sleep hygiene promoted; helpful. Improved pain control with addition of morphine PRN.   Problem: Fall Risk  Goal: Patient will remain free from falls  Outcome: Progressing     Problem: Skin Integrity  Goal: Skin integrity is maintained or improved  Outcome: Progressing

## 2022-03-23 NOTE — DISCHARGE PLANNING
Agency/Facility Name: [facility]  Outcome: Left Vmail regarding referral       1113  Agency/Facility Name: [facility]  Spoke To: Edith  Outcome: Per edith referral not received, DPA resent

## 2022-03-24 NOTE — PROGRESS NOTES
Tele strip printed at 1336     Rhythm: SR   HR: 92  Measurements: 0.16/0.08/0.36        Telemetry Shift Summary     Rhythm: SR  HR Range: 84-98  Ectopy: Rare PVC/PAC, PSVT <3 sec up to 160's     Telemetry monitoring strips placed in pt's chart.

## 2022-03-24 NOTE — DOCUMENTATION QUERY
[facility] access hospital                                                                       Query Response Note      PATIENT:               VICK LI  ACCT #:                  [id number]  MRN:                     [id number]  :                      1960  ADMIT DATE:       3/19/2022 1:53 PM  DISCH DATE:          RESPONDING  PROVIDER #:        [id number]           QUERY TEXT:    Anemia requiring blood transfusion is documented in this medical record, as noted in the clinical indicators.  Based on your clinical judgement can a more specific diagnosis be provide for this anemia.      The patient's Clinical Indicators include:  Anemia requiring blood transfusion is documented in the progress notes for this patient.  No evidence of gross bleeding with hemoglobin of 6.5 on admission.  Noted that hemoglobin was around 8 ~ 2 months ago.    3/19/22 transfusion of 1 unit hemoglobin up to 8 post transfusion    3/23/22 hemoglobin drop to 6.7 - transfused another 1 U of PRBC's (no post transfusion hemoglobin on chart yet)  Risk:  Pt has a very complicated medical history with history of liver transplant due to sarcoidosis leading to liver cirrhosis     Thank you,  Mery Tirado, RN, BSN  Clinical    [facility] via [employer]  [phone]  [email or web address]  Options provided:   -- Acute blood loss anemia   -- Chronic blood loss   -- Unable to determine      Query created by: Mery Tirado on 3/23/2022 6:13 PM    RESPONSE TEXT:    Acute blood loss anemia          Electronically signed by:  IRAM BORDEN MD 3/24/2022 11:35 AM

## 2022-03-24 NOTE — PROGRESS NOTES
[facility] Medicine Daily Progress Note    Date of Service  3/24/2022    Chief Complaint  Roxana Cuba is a 62 y.o. 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 She presented to [facility] on 3/19/2022 with generalized weakness and right shoulder pain, neck pain after a fall.  Patient reported that she has severe, 10 out of 10 pain in the right shoulder after falling.  She is not sure if she lost consciousness or hit her head.  She denies having palpitations shortness of breath before or after the fall.  She was found to have a right sided nondisplaced proximal humerus fracture. This was evaluated by Dr. Irwin of orthopedic surgery who recommended a sling and supportive care with no surgical intervention.  CT of cervical spine showed no acute injury and acute CT of the head showed no acute issues, only chronic post operative changes and frontal atrophy.    Acute anemia 6.5 at admission.  Baseline hemoglobin 8s 2 months ago.  Patient denies active bleeding signs.  Per chart review, patient had EGD 2/2022 for persistent nausea vomitingwhich was negative for acute pathology or bleeding signs.  Known history of multiple bariatric surgeries.  Her hemoglobin has been stable at 8s after RBC transfusion.      Interval Problem Update  She remains aox3.  Better mood today.     Patient received RBC transfusion yesterday.  Today's lab pending  No bowel movement for 3 days.  Continue bowel management, suppository was given.  Enema if needed.  pending occult stool test.  Will consult GI if it came back positive    Psych recommended Remeron 7.5 mg nightly,  ordered    she remains on her chronic 4L.   Dc SNF vs HH (the patient wants to go home, the  prefers SNF)    I have personally seen and examined the patient at bedside. I discussed the plan of care with patient, nursing and case management    Consultants/Specialty    Code Status  Full Code    Disposition  Patient is not medically cleared for discharge.   Anticipate discharge to be determined    Review of Systems  Review of Systems   Constitutional: Negative.  Negative for chills, diaphoresis, fever, malaise/fatigue and weight loss.   HENT: Negative.  Negative for sore throat.    Eyes: Negative.  Negative for blurred vision.   Respiratory: Negative.  Negative for cough and shortness of breath.    Cardiovascular: Negative.  Negative for chest pain, palpitations and leg swelling.   Gastrointestinal: Negative.  Negative for abdominal pain, nausea and vomiting.   Genitourinary: Negative.  Negative for dysuria.   Musculoskeletal: Positive for falls and joint pain. Negative for myalgias and neck pain.   Skin: Negative.  Negative for itching and rash.   Neurological: Negative.  Negative for dizziness, focal weakness, weakness and headaches.   Endo/Heme/Allergies: Negative.  Does not bruise/bleed easily.   Psychiatric/Behavioral: Negative.  Negative for depression, substance abuse and suicidal ideas.   All other systems reviewed and are negative.       Physical Exam  Temp:  [36.6 °C (97.9 °F)-36.8 °C (98.3 °F)] 36.8 °C (98.3 °F)  Pulse:  [71-87] 75  Resp:  [16-18] 18  BP: (120-143)/(59-68) 132/63  SpO2:  [96 %-100 %] 97 %    Physical Exam  Vitals and nursing note reviewed. Exam conducted with a chaperone present.   Constitutional:       General: She is not in acute distress.     Appearance: Normal appearance. She is not diaphoretic.   HENT:      Head: Normocephalic.      Nose: Nose normal. No congestion or rhinorrhea.      Mouth/Throat:      Mouth: Mucous membranes are moist.      Pharynx: Oropharynx is clear. No  oropharyngeal exudate.   Eyes:      Pupils: Pupils are equal, round, and reactive to light.   Cardiovascular:      Rate and Rhythm: Normal rate and regular rhythm.      Pulses: Normal pulses.      Heart sounds: Normal heart sounds. No murmur heard.  Pulmonary:      Effort: Pulmonary effort is normal. No respiratory distress.      Breath sounds: Normal breath sounds. No wheezing, rhonchi or rales.   Chest:      Chest wall: No tenderness.   Abdominal:      General: Abdomen is flat. Bowel sounds are normal. There is no distension.      Palpations: Abdomen is soft. There is no mass.      Tenderness: There is no abdominal tenderness. There is no left CVA tenderness, guarding or rebound.   Musculoskeletal:         General: No swelling or deformity. Normal range of motion.      Cervical back: Normal range of motion and neck supple. No rigidity or tenderness.      Comments: Right arm in sling - sensation and pulses intact   Lymphadenopathy:      Cervical: No cervical adenopathy.   Skin:     General: Skin is warm and dry.      Capillary Refill: Capillary refill takes less than 2 seconds.      Coloration: Skin is pale. Skin is not jaundiced.      Findings: No erythema.   Neurological:      General: No focal deficit present.      Mental Status: She is alert and oriented to person, place, and time.      Cranial Nerves: No cranial nerve deficit.      Sensory: No sensory deficit.      Motor: No weakness.      Coordination: Coordination normal.   Psychiatric:         Mood and Affect: Mood normal.         Behavior: Behavior normal.         Fluids    Intake/Output Summary (Last 24 hours) at 3/24/2022 1610  Last data filed at 3/24/2022 0650  Gross per 24 hour   Intake --   Output 300 ml   Net -300 ml       Laboratory  Recent Labs     03/23/22  0238   WBC 2.8*   RBC 2.51*   HEMOGLOBIN 6.7*   HEMATOCRIT 21.7*   MCV 86.5   MCH 26.7*   MCHC 30.9*   RDW 49.7   PLATELETCT 111*   MPV 9.3     Recent Labs     03/23/22  0238   SODIUM 137    POTASSIUM 4.2   CHLORIDE 102   CO2 29   GLUCOSE 99   BUN 14   CREATININE 0.77   CALCIUM 8.6     Recent Labs     03/22/22  1420   INR 0.92               Imaging  EC-ECHOCARDIOGRAM COMPLETE W/O CONT   Final Result      CT-HEAD W/O   Final Result      1.  There is no acute intracranial hemorrhage.   2.  Stable postoperative changes of left frontal parietal craniotomy with adjacent underlying dural thickening.   3.  Stable mild frontal atrophy.         CT-CSPINE WITHOUT PLUS RECONS   Final Result      No acute fracture or dislocation seen in the CT scan of the cervical spine.      DX-WRIST-COMPLETE 3+ RIGHT   Final Result      1.  There is no acute fracture or malalignment of the right wrist. There is minimal degenerative change in the wrist.      DX-ELBOW-LIMITED 2- RIGHT   Final Result      1.  No acute fracture or dislocation.      2.  Evidence of previous ulnar fracture is noted.      DX-SHOULDER 2+ RIGHT   Final Result      1.  There is a comminuted multipart fracture of the right lateral head and neck.           Assessment/Plan  Syncope- (present on admission)  Assessment & Plan  EKG shows sinus tachycardia with a rate of 97, there is no ST elevation, there is no significant ST depression, QTc 483.  No significant findings on tele   Echocardiography revealed no significant findings  Known hx of recent SDH post craniotomy leading to a seizure disorder    Continue seizure meds and tele monitor    Closed fracture of proximal end of humerus- (present on admission)  Assessment & Plan  Imaging shows evidence for comminuted multipart fracture of the right lateral head and neck  See above  Non surgical per orthopedic surgery  Sling supportive care  Following  PT/ OT    Lactic acidosis- (present on admission)  Assessment & Plan  Initial lactic acid of 2.9, likely secondary to reduced intravascular volume.  Now resolved    Anemia requiring a blood transfusion- (present on admission)  Assessment & Plan  No evidence of  gross bleeding.  Acute on chronic  Acute anemia 6.5 at admission.  Baseline hemoglobin 8s 2 months ago.   Transfused prbc 3/19, 3/23  Per chart review, patient had EGD 2/2022 for persistent nausea vomitingwhich was negative for acute pathology or bleeding signs.  Known history of multiple bariatric surgeries.    Her hemoglobin has been stable at 8s after RBC transfusion.  Occult test was not able to be done due to no bowel movement for past 2 days.  .   Iron study does not show iron deficiency.  B12 was normal  2 months ago    Transfuse for a hemoglobin of less than or equal to 7     Continue to follow    Hypocalcemia- (present on admission)  Assessment & Plan  ionized ca within normal limits      SDH (subdural hematoma) (HCC)- (present on admission)  Assessment & Plan  Status post recent craniotomy leading to a seizure disorder  continue seizure medications  Seizure precautions    Hyperglycemia- (present on admission)  Assessment & Plan  History of diabetes when she was 100 pounds heavier  Continue sliding scale   glycated hemoglobin pending  Accu-Checks, hypoglycemia protocol     Thrombocytopenia (HCC)  Assessment & Plan  Chronic  Related to liver disease and transplant  In stable range  Follow intermittently    IRMA (acute kidney injury) (HCC)- (present on admission)  Assessment & Plan  Consistent dehydration on admit - resolved, stable off of iv hydration  Home diuretics held - following and will restart when appropriate - today euvolemic on exam  following    Recurrent major depressive disorder, in remission (HCC)- (present on admission)  Assessment & Plan  continue home amitriptyline and sertraline    Psychiatry consulted for unstable mood.    Psych recommended to continue current medication, added  Remeron 7.5 mg nightly.  Continue to follow.     GERD (gastroesophageal reflux disease)- (present on admission)  Assessment & Plan  continue omeprazole    Chronic respiratory failure (HCC)- (present on  admission)  Assessment & Plan  On 4L at baseline  No evidence of exacerbation  following    DUC (generalized anxiety disorder)- (present on admission)  Assessment & Plan  See above  Depression and overwhelm related to health issues contributing  Psychology consulted    Hypothyroidism- (present on admission)  Assessment & Plan  Resume home levothyroxine    Status post liver transplant Dr. Canada ([facility])- (present on admission)  Assessment & Plan  Continue chronic immunosuppressive medications  Has follow up next week planned       VTE prophylaxis: SCDs/TEDs    I have performed a physical exam and reviewed and updated ROS and Plan today (3/24/2022). In review of yesterday's note (3/23/2022), there are no changes except as documented above.

## 2022-03-24 NOTE — THERAPY
"Occupational Therapy  Daily Treatment     Patient Name: Roxana Cuba  Age:  62 y.o., Sex:  female  Medical Record #: [id number]  Today's Date: 3/24/2022     Precautions  Precautions: Non Weight Bearing Right Upper Extremity (sling R UE)    Assessment    Patient seen for OT treat necessitating assist with ADLs and mobility.     Plan    Continue current treatment plan.    DC Equipment Recommendations: Unable to determine at this time  Discharge Recommendations: Recommend post-acute placement for additional occupational therapy services prior to discharge home (may benefit from GH consideration if SO unable to provide adequate, persistent S/A)       Objective       03/24/22 0925   Cognition    Comments likely would benefit from structured setting with sense of patient being in control and given choices. spoke to therapist with minimal conversation and dialogue, just long monologues   Strength Upper Body   Comments \"I don't know\" was frequent response to body awareness questions. Report self as strong \"but break stuff because my liver meds cause my calcium to not be in my bones\" no elaboration when askedif pcp knew and what pcp suggests.   Activities of Daily Living   Eating Supervision   Grooming Minimal Assist;Standing   Bathing Moderate Assist  (light sponge UB only)   Upper Body Dressing Maximal Assist  (no sling education carryover)   Lower Body Dressing Minimal Assist   Toileting Minimal Assist   Comments frequent LOB if therapist not present to steady   Functional Mobility   Mobility HHA/wall cruising   Patient / Family Goals   Patient / Family Goal #1 \"whatever\"   Goal #1 Outcome Goal not met   Short Term Goals   Short Term Goal # 1 Pt will dress UBand LB with Sol in 5 visits   Goal Outcome # 1 Progressing slower than expected   Short Term Goal # 2 Pt will groom 8 min standing at sink with SBA in 5 visits   Goal Outcome # 2 Progressing as expected   Short Term Goal # 3 pt will toilet SBA in 4 visits   Goal " Outcome # 3 Progressing as expected

## 2022-03-24 NOTE — CARE PLAN
The patient is Stable - Low risk of patient condition declining or worsening    Shift Goals  Clinical Goals: Psych consult, placement  Patient Goals: D/C to home    Progress made toward(s) clinical / shift goals:        Problem: Pain - Standard  Goal: Alleviation of pain or a reduction in pain to the patient’s comfort goal  3/24/2022 1241 by Silviano Evans R.N.  Outcome: Progressing  Note: pt calls appropriately when pain is becoming unbearable.     Problem: Knowledge Deficit - Standard  Goal: Patient and family/care givers will demonstrate understanding of plan of care, disease process/condition, diagnostic tests and medications  Outcome: Progressing     Problem: Skin Integrity  Goal: Skin integrity is maintained or improved  Outcome: Progressing   Note: RN monitoring skin, intact.

## 2022-03-24 NOTE — PROGRESS NOTES
Received bedside report from PENNY Solomon, pt care assumed. VSS, pt assessment complete. Pt A&Ox4, pt c/o 4/10 throbbing pain in shoulder at this time. POC discussed with pt and verbalizes no questions. Pt denies any additional needs at this time. Bed locked and in lowest position, bed alarm active. Pt educated on fall risk and verbalized understanding, call light within reach, hourly rounding initiated.

## 2022-03-24 NOTE — PROGRESS NOTES
Telemetry Shift Summary    Rhythm: SR  HR: 70-80  Ectopy: R-PVC    Measurements for strip printed 3/24/2022 at 1429  HR 78  0.14 / 0.08 / 0.40    Normal Values  Rhythm: SR  HR:   Measurements: 0.12-0.20 / 0.06-0.10 / 0.30-0.52

## 2022-03-24 NOTE — CONSULTS
"[facility] INPATIENT PSYCHIATRIC CONSULT LIAISON NOTE:      DOS: 03/24/2022     REASON FOR CONSULT:  Medication management    CC: “I have been down for a long time”     HPI: Per chart review: \"Roxana Cuba is a 62 y.o. 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  Patient reports that she has severe, 10 out of 10 pain in the right shoulder after falling.  She is not sure if she lost consciousness or hit her head.  She denies having palpitations shortness of breath before or after the fall.  Pain is worse with attempt to move the shoulder.  The pain radiates to the right side of the neck.  No relieving factors for the pain\"       Patient was seen today as a a follow up consult. Denies SI/HI/AVH. Reports symptoms of depression, \"I have been down for a long time. This has been a big deal for me. I had the transplant on 12/26, nothing has been good for me. I have to get out of this funk\" She is not sleeping well. She has tried Trazodone and Melatonin for several years, they did not help. Has a poor appetite. Has lost 40lbs.  Discussed risks and benefits of Remeron for sleep and appetite. Its effects as an antidepressant discussed. Patient is willing to try Remeron.    Discussed her current medications, no side effects noted or reported.     Discussed plan of care with Dr. Monse Ac via [employer].    LEGAL HOLD: Not applicable     ALLERGIES:       Allergies   Allergen Reactions   • Nsaids Unspecified       Can not take due to hx of liver transplant    • Rhubarb Anaphylaxis   • Organic Nitrates Unspecified       Nitroglycerin products should be " "avoided with the use of PDE-5 inhibitors as the combination can result in severe hypotension.  RD clarified with pt: Nitrates in food are okay and pt does not want nitrates to be listed as food allergy. Pt only avoids medications with nitrates.    • Other Drug         Any medication that may interact with cyclosporine, tacrolimus, sirolimus, or prograf (due to hx of liver transplant)        PAST MEDICAL HISTORY:     Past Medical History:   Diagnosis Date   • Anemia     • Anesthesia       \"takes more to get me under, would rather be intubated\"    • Breath shortness       cont oxygen 5L (Preffered)   • Bronchitis       2016   • Cardiomegaly     • Chronic fatigue and malaise     • Chronic obstructive pulmonary disease (HCC)     • Cirrhosis (HCC) December 2011     Status post liver transplant at [facility]   • CKD (chronic kidney disease) stage 3, GFR 30-59 ml/min ([facility])     • Diabetes ([facility])       reports hx of, resolved w/weight loss. Reports still checking bloodsugars twice daily and using insulin PRN   • Fracture of left foot     • GERD (gastroesophageal reflux disease)           • H/O Clostridium difficile infection 02-17-17     reports \"16 months ago\". Current stool sample 01-26-17 neg   • Hemorrhagic disorder (HCC)       \"low platelets\" bruises easily    • Hiatus hernia syndrome     • High cholesterol     • Hypothyroid     • Jaundice 2009   • Liver transplanted ([facility])     • Low back pain 02-17-17     and left foot, 7/10   • Mild aortic regurgitation and aortic valve sclerosis     • On home oxygen therapy       5 liters, Dr. Gaming   • Platelet disorder ([facility])       low platelets   • Pneumonia       aspiration,    • Psychiatric disorder       Mood disorder   • Pulmonary hypertension ([facility])          • S/P cholecystectomy     • Small bowel obstruction ([facility])       01-   • Splenomegaly     • VRE (vancomycin-resistant Enterococci)       02-17-17, pt declines knowledge of this              PAST PSYCHIATRIC " "HISTORY:     Psychiatric disorder   Mood disorder        LEGAL HISTORY:  Denies     SOCIAL HISTORY:     Socioeconomic History   • Marital status:    Tobacco Use   • Smoking status: Never Smoker   • Smokeless tobacco: Never Used   • Tobacco comment: avoid all tobacco products   Vaping Use   • Vaping Use: Never used   Substance and Sexual Activity   • Alcohol use: Not Currently       Alcohol/week: 0.0 oz   • Drug use: No              CURRENT HOSPITAL PROBLEMS:     IRMA (acute kidney injury) (HCC)   Anemia requiring a blood transfusion   Chronic respiratory failure (HCC)   Closed fracture of proximal end of humerus   DUC (generalized anxiety disorder)   GERD (gastroesophageal reflux disease)   Hyperglycemia   Hypocalcemia   Hypothyroidism   Lactic acidosis   Recurrent major depressive disorder, in remission (HCC)   SDH (subdural hematoma) (HCC)   Status post liver transplant Dr. Canada ([facility])   Syncope   Thrombocytopenia ([facility])              PSYCHIATRIC EXAMNIATION:  VITALS: WNL     MENTAL STATUS EXAM:  Appearance: Dressed in hospital gown, normal grooming and hygiene.  Attitude: Calm cooperative.   Behavior: Unreakable  Musculoskeletal: Unremarkable, patient in bed  Eye Contact: Adequate.  Speech:  coherent, adequate volume  Affect: Depressed  Mood: \"I have been down for a long time\"  Process: Goal-directed, organized. Linear  Content: No SI/HI  Perception: Negative for AVH. Negative for Delusion  Orientation: Oriented to Time, Place, Person and Self.  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   Generalized Anxiety Disorder      Legal Hold: Voluntary  -Recommend:         Start Remeron 7.5mg P.O at " bedtime for sleep and appetite.  -Psych CL will continue following patient while at [facility].  -Medication reconciliation was completed.  -Reviewed safety plan - 911, ER, PCM, MHC, Suicide Crisis Line, Nursing staff while    inpatient.  -Visitors: Yes  -Personal Belongings: Yes  -Observation Level: Tele monitor    -Instruction: Discharge recommendations per treatment team

## 2022-03-24 NOTE — CARE PLAN
The patient is Watcher - Medium risk of patient condition declining or worsening    Shift Goals  Clinical Goals: pain managment  Patient Goals: go home    Progress made toward(s) clinical / shift goals:   Problem: Pain - Standard  Goal: Alleviation of pain or a reduction in pain to the patient’s comfort goal  Outcome: Met  Note: PRN oxycodone and dilaudid effective for pain control     Problem: Fall Risk  Goal: Patient will remain free from falls  Outcome: Met  Note: Bed alarms remain on for patient safety       Patient is not progressing towards the following goals:      Problem: Knowledge Deficit - Standard  Goal: Patient and family/care givers will demonstrate understanding of plan of care, disease process/condition, diagnostic tests and medications  Outcome: Not Met  Note: Pt requiring re-education

## 2022-03-25 NOTE — CONSULTS
Brief Behavioral Health Note:    Received consultation request for psychotherapy.  Attempted to meet patient in person today at [facility] for a psychotherapy session.  Upon arrival patient was found having difficulty using the bathroom and was not in position to engage in psychotherapy session at this time.  Patient requested that I return at a later date.    Please confirm if patient is interested in a psychotherapy session.  If so, I will be happy to return and have an inpatient meeting with patient.    Thank you for this consultation   Maryuri Coelho PhD LCSW

## 2022-03-25 NOTE — CARE PLAN
The patient is Stable - Low risk of patient condition declining or worsening    Shift Goals  Clinical Goals: pain control, sleep  Patient Goals: pain control, go home    Progress made toward(s) clinical / shift goals:      Problem: Knowledge Deficit - Standard  Goal: Patient and family/care givers will demonstrate understanding of plan of care, disease process/condition, diagnostic tests and medications  Outcome: Progressing     Problem: Skin Integrity  Goal: Skin integrity is maintained or improved  Outcome: Progressing      Patient is not progressing towards the following goals:      Problem: Pain - Standard  Goal: Alleviation of pain or a reduction in pain to the patient’s comfort goal  Outcome: Not Progressing  Pt's reports pain meds not helping, requests something more to help with the pain

## 2022-03-25 NOTE — DISCHARGE SUMMARY
"Discharge Summary    CHIEF COMPLAINT ON ADMISSION  Chief Complaint   Patient presents with   • GLF     Fell out of bed in her sleep. Right arm pain. Neck pain. Headache.   • Head Injury     C/o headache since falling out of bed. States she had a craniotomy in August due to a subdural hematoma that resulted from a head injury.   • Weight Loss     Also reports weight loss of 44lb over the last 2 months.   • Vision Change     \"my vision is all messed up\" for the last 3 months.   • Fatigue     Worsening over months.       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 She presented to [facility] on 3/19/2022 with generalized weakness and right shoulder pain, neck pain after a fall.      She was found to have a right sided nondisplaced proximal humerus fracture. This was evaluated by Dr. Irwin of orthopedic surgery who recommended a sling and supportive care with no surgical intervention.  CT of cervical spine showed no acute injury and acute CT of the head showed no acute issues, only chronic post operative changes and frontal atrophy.     She was found to have acute anemia 6.5 at admission.  Baseline hemoglobin 8s 2 months ago.  Patient denies active bleeding signs.  Per chart review, patient had EGD 2/2022 for persistent nausea vomitingwhich was negative for acute pathology or bleeding signs. Her hemoglobin has been stable at 8s after 3 units PRBC transfusions. FOBT negative.     Psychiatry was consulted for anxiety and recommended starting Remeron 7.5mg nightly.     PT/OT evaluated the patient and " recommended post acute placement. However patient declines and adamantly wants going home with [facility]. C has been set up prior discharge.     Therefore, she is discharged in fair and stable condition to home with organized home healthcare and close outpatient follow-up. Patient is going to have GI Dr. Canada follow up next week. She is advised to follow up with PCP and orthopedics as outpatient    The patient met 2-midnight criteria for an inpatient stay at the time of discharge.    Discharge Date  3/25/2022    FOLLOW UP ITEMS POST DISCHARGE  PCP  GI Dr. Dread Irwin orthopedics    DISCHARGE DIAGNOSES  Active Problems:    Status post liver transplant Dr. Canada ([facility]) POA: Yes      Overview: -December 2011: Status post liver transplant for end stage liver disease       at [facility], followed by Dr. Canada.                Hypothyroidism POA: Yes    DUC (generalized anxiety disorder) POA: Yes      Overview: IMO load March 2020    Chronic respiratory failure (HCC) POA: Yes    GERD (gastroesophageal reflux disease) POA: Yes    Recurrent major depressive disorder, in remission (HCC) POA: Yes    IRMA (acute kidney injury) (HCC) POA: Yes    Thrombocytopenia (HCC) POA: Unknown    Hyperglycemia POA: Yes    SDH (subdural hematoma) (HCC) POA: Yes    Hypocalcemia POA: Yes    Anemia requiring a blood transfusion POA: Yes    Lactic acidosis POA: Yes    Closed fracture of proximal end of humerus POA: Yes    Syncope POA: Yes  Resolved Problems:    * No resolved hospital problems. *      FOLLOW UP  Future Appointments   Date Time Provider Department Center   3/29/2022  1:00 PM Halley Levine M.D. [facility] None   4/8/2022  1:20 PM Gwyn Gaming M.D. [facility] None   4/13/2022 11:15 AM Maxwell Phan M.D. [facility] None   5/4/2022  9:00 AM Leighton Santoro, Ph.D. [facility] None     [address]  [facility] [address]  [phone]        Elisa Phillip M.D.  [address]  [facility] [address]  [facility] [address]  [phone]    In 1 week      Jaime Canada M.D.  [address]  [place] [address]  [facility] [address] [phone]    In 2 weeks  post hospital admission follow up      MEDICATIONS ON DISCHARGE     Medication List      START taking these medications      Instructions   mirtazapine 15 MG Tbdp  Commonly known as: Remeron   Take 0.5 Tablets by mouth at bedtime for 30 days.  Dose: 7.5 mg        CONTINUE taking these medications      Instructions   albuterol 108 (90 Base) MCG/ACT Aers inhalation aerosol  Commonly known as: ProAir HFA   Inhale 2 Puffs every four hours as needed for Shortness of Breath (wheezing).  Dose: 2 Puff     ambrisentan 10 MG tablet  Commonly known as: LETAIRIS   TAKE 1 TABLET BY MOUTH EVERY DAY  Dose: 10 mg     amitriptyline 25 MG Tabs  Commonly known as: ELAVIL   Take 25 mg by mouth every evening.  Dose: 25 mg     CALCIUM-VITAMIN D PO   Take 1 Tablet by mouth every evening.  Dose: 1 Tablet     clonazePAM 0.5 MG Tabs  Commonly known as: KLONOPIN   Take 1 Tablet by mouth 3 times a day for 30 days.  Dose: 0.5 mg     Emgality 120 MG/ML Soaj  Generic drug: Galcanezumab-gnlm   Inject 1 PEN under the skin every 4 weeks.  Dose: 1 PEN     fludrocortisone 0.1 MG Tabs  Commonly known as: FLORINEF   Take 0.1 mg by mouth every day. Indications: Blood Pressure Drop Upon Standing  Dose: 0.1 mg     furosemide 20 MG Tabs  Commonly known as: LASIX   Take 1 Tablet by mouth every day. Indications: Edema  Dose: 20 mg     Keppra 750 MG tablet  Generic drug: levetiracetam   Take 750 mg by mouth 2 times a day.  Dose: 750 mg     lacosamide 100 MG Tabs tablet  Commonly known as: VIMPAT   Take 1 Tablet by mouth 2 times a day for 30 days.  Dose: 100 mg     levothyroxine 137 MCG Tabs  Commonly known as: SYNTHROID   Take 1 Tablet by mouth every morning on an empty stomach. Indications: Underactive Thyroid  Dose: 137 mcg     magnesium oxide 400 MG Tabs tablet  Commonly known as:  MAG-OX   Take 400 mg by mouth every day.  Dose: 400 mg     metoclopramide 10 MG Tabs  Commonly known as: REGLAN   Take 10 mg by mouth 2 times a day.  Dose: 10 mg     mycophenolate 250 MG Caps  Commonly known as: CELLCEPT   Take 250 mg by mouth 2 times a day. Indications: Liver Transplant Recipient  Dose: 250 mg     omeprazole 40 MG delayed-release capsule  Commonly known as: PRILOSEC   Take 1 Capsule by mouth every day.  Dose: 40 mg     ondansetron 4 MG Tbdp  Commonly known as: ZOFRAN ODT   DISSOLVE 1 TAB ON TONGUE EVERY 8 HOURS AS NEEDED FOR NAUSEA     oxyCODONE immediate release 10 MG immediate release tablet  Commonly known as: ROXICODONE   Take 10 mg by mouth every 6 hours as needed for Moderate Pain. Take 1 tablet by mouth every 6-8 for moderate to severe pain  Dose: 10 mg     potassium chloride SA 20 MEQ Tbcr  Commonly known as: Kdur   Take 1 Tablet by mouth every day.  Dose: 20 mEq     pravastatin 20 MG Tabs  Commonly known as: PRAVACHOL   Take 1 Tablet by mouth every day.  Dose: 20 mg     sertraline 100 MG Tabs  Commonly known as: Zoloft   Take 1.5 Tablets by mouth every day.  Dose: 150 mg     tacrolimus 1 MG Caps  Commonly known as: PROGRAF   Take 2 mg by mouth 2 times a day. Indications: Liver Transplant Recipient  Dose: 2 mg     tadalafil 20 MG tablet  Commonly known as: CIALIS   Take 1 Tablet by mouth every day.  Dose: 20 mg     therapeutic multivitamin-minerals Tabs   Take 1 Tablet by mouth every day. Indications: suppliment  Dose: 1 Tablet     vitamin C 500 MG Chew   Chew 500 mg every day. Indications: Inadequate Vitamin C  Dose: 500 mg            Allergies  Allergies   Allergen Reactions   • Nsaids Unspecified     Can not take due to hx of liver transplant    • Rhubarb Anaphylaxis   • Organic Nitrates Unspecified     Nitroglycerin products should be avoided with the use of PDE-5 inhibitors as the combination can result in severe hypotension.  RD clarified with pt: Nitrates in food are okay and pt does  not want nitrates to be listed as food allergy. Pt only avoids medications with nitrates.    • Other Drug      Any medication that may interact with cyclosporine, tacrolimus, sirolimus, or prograf (due to hx of liver transplant)        DIET  Orders Placed This Encounter   Procedures   • Diet Order Diet: Regular     Standing Status:   Standing     Number of Occurrences:   1     Order Specific Question:   Diet:     Answer:   Regular [1]       ACTIVITY  As tolerated.  Weight bearing as tolerated    CONSULTATIONS  Orthopedics  Psychiatry    PROCEDURES  none    LABORATORY  Lab Results   Component Value Date    SODIUM 139 03/25/2022    POTASSIUM 4.6 03/25/2022    CHLORIDE 103 03/25/2022    CO2 25 03/25/2022    GLUCOSE 91 03/25/2022    BUN 15 03/25/2022    CREATININE 0.73 03/25/2022        Lab Results   Component Value Date    WBC 2.7 (L) 03/25/2022    HEMOGLOBIN 7.8 (L) 03/25/2022    HEMATOCRIT 26.0 (L) 03/25/2022    PLATELETCT 120 (L) 03/25/2022      EC-ECHOCARDIOGRAM COMPLETE W/O CONT   Final Result      CT-HEAD W/O   Final Result      1.  There is no acute intracranial hemorrhage.   2.  Stable postoperative changes of left frontal parietal craniotomy with adjacent underlying dural thickening.   3.  Stable mild frontal atrophy.         CT-CSPINE WITHOUT PLUS RECONS   Final Result      No acute fracture or dislocation seen in the CT scan of the cervical spine.      DX-WRIST-COMPLETE 3+ RIGHT   Final Result      1.  There is no acute fracture or malalignment of the right wrist. There is minimal degenerative change in the wrist.      DX-ELBOW-LIMITED 2- RIGHT   Final Result      1.  No acute fracture or dislocation.      2.  Evidence of previous ulnar fracture is noted.      DX-SHOULDER 2+ RIGHT   Final Result      1.  There is a comminuted multipart fracture of the right lateral head and neck.          Total time of the discharge process exceeds 45 minutes.

## 2022-03-25 NOTE — PROGRESS NOTES
[facility] Medicine Daily Progress Note    Date of Service  3/25/2022    Chief Complaint  Roxana Cuba is a 62 y.o. 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 She presented to [facility] on 3/19/2022 with generalized weakness and right shoulder pain, neck pain after a fall.  Patient reported that she has severe, 10 out of 10 pain in the right shoulder after falling.  She is not sure if she lost consciousness or hit her head.  She denies having palpitations shortness of breath before or after the fall.  She was found to have a right sided nondisplaced proximal humerus fracture. This was evaluated by Dr. Irwin of orthopedic surgery who recommended a sling and supportive care with no surgical intervention.  CT of cervical spine showed no acute injury and acute CT of the head showed no acute issues, only chronic post operative changes and frontal atrophy.    Acute anemia 6.5 at admission.  Baseline hemoglobin 8s 2 months ago.  Patient denies active bleeding signs.  Per chart review, patient had EGD 2/2022 for persistent nausea vomitingwhich was negative for acute pathology or bleeding signs.  Known history of multiple bariatric surgeries.  Her hemoglobin has been stable at 8s after RBC transfusion.      Interval Problem Update  She remains aox3. Reports feeling very weak.   No acute overnight events.   No bowel movements for 5 days. Given mag citrate, if not working, will try enema.   S/p 3 units PRBC transfusion 3/19, 20, 23.  FOBT pending.   Psych recommended Remeron 7.5 mg nightly, ordered  she remains on her chronic 4L.   Dc SNF vs  (the  patient wants to go home, the  prefers SNF)    I have personally seen and examined the patient at bedside. I discussed the plan of care with patient, nursing and case management    Consultants/Specialty    Code Status  Full Code    Disposition  Patient is not medically cleared for discharge.   Anticipate discharge to be determined    Review of Systems  Review of Systems   Constitutional: Negative.  Negative for chills, diaphoresis, fever, malaise/fatigue and weight loss.   HENT: Negative.  Negative for sore throat.    Eyes: Negative.  Negative for blurred vision.   Respiratory: Negative.  Negative for cough and shortness of breath.    Cardiovascular: Negative.  Negative for chest pain, palpitations and leg swelling.   Gastrointestinal: Negative.  Negative for abdominal pain, nausea and vomiting.   Genitourinary: Negative.  Negative for dysuria.   Musculoskeletal: Positive for falls and joint pain. Negative for myalgias and neck pain.   Skin: Negative.  Negative for itching and rash.   Neurological: Negative.  Negative for dizziness, focal weakness, weakness and headaches.   Endo/Heme/Allergies: Negative.  Does not bruise/bleed easily.   Psychiatric/Behavioral: Negative.  Negative for depression, substance abuse and suicidal ideas.   All other systems reviewed and are negative.       Physical Exam  Temp:  [36.7 °C (98.1 °F)-37 °C (98.6 °F)] 36.7 °C (98.1 °F)  Pulse:  [71-92] 76  Resp:  [17-19] 18  BP: (113-146)/(42-72) 146/72  SpO2:  [95 %-100 %] 100 %    Physical Exam  Vitals and nursing note reviewed. Exam conducted with a chaperone present.   Constitutional:       General: She is not in acute distress.     Appearance: Normal appearance. She is not diaphoretic.   HENT:      Head: Normocephalic.      Nose: Nose normal. No congestion or rhinorrhea.      Mouth/Throat:      Mouth: Mucous membranes are moist.      Pharynx: Oropharynx is clear. No oropharyngeal exudate.   Eyes:      Pupils: Pupils are equal,  round, and reactive to light.   Cardiovascular:      Rate and Rhythm: Normal rate and regular rhythm.      Pulses: Normal pulses.      Heart sounds: Normal heart sounds. No murmur heard.  Pulmonary:      Effort: Pulmonary effort is normal. No respiratory distress.      Breath sounds: Normal breath sounds. No wheezing, rhonchi or rales.   Chest:      Chest wall: No tenderness.   Abdominal:      General: Abdomen is flat. Bowel sounds are normal. There is no distension.      Palpations: Abdomen is soft. There is no mass.      Tenderness: There is no abdominal tenderness. There is no left CVA tenderness, guarding or rebound.   Musculoskeletal:         General: No swelling or deformity. Normal range of motion.      Cervical back: Normal range of motion and neck supple. No rigidity or tenderness.      Comments: Right arm in sling - sensation and pulses intact   Lymphadenopathy:      Cervical: No cervical adenopathy.   Skin:     General: Skin is warm and dry.      Capillary Refill: Capillary refill takes less than 2 seconds.      Coloration: Skin is pale. Skin is not jaundiced.      Findings: No erythema.   Neurological:      General: No focal deficit present.      Mental Status: She is alert and oriented to person, place, and time.      Cranial Nerves: No cranial nerve deficit.      Sensory: No sensory deficit.      Motor: No weakness.      Coordination: Coordination normal.   Psychiatric:         Mood and Affect: Mood normal.         Behavior: Behavior normal.         Fluids    Intake/Output Summary (Last 24 hours) at 3/25/2022 1251  Last data filed at 3/25/2022 1046  Gross per 24 hour   Intake 240 ml   Output --   Net 240 ml       Laboratory  Recent Labs     03/23/22  0238 03/24/22  1641 03/25/22  0249   WBC 2.8* 2.5* 2.7*   RBC 2.51* 2.86* 2.91*   HEMOGLOBIN 6.7* 7.8* 7.8*   HEMATOCRIT 21.7* 25.0* 26.0*   MCV 86.5 87.4 89.3   MCH 26.7* 27.3 26.8*   MCHC 30.9* 31.2* 30.0*   RDW 49.7 49.4 49.6   PLATELETCT 111* 117* 120*    MPV 9.3 8.9* 8.7*     Recent Labs     03/23/22  0238 03/24/22  1641 03/25/22  0249   SODIUM 137 138 139   POTASSIUM 4.2 4.6 4.6   CHLORIDE 102 102 103   CO2 29 27 25   GLUCOSE 99 134* 91   BUN 14 15 15   CREATININE 0.77 0.73 0.73   CALCIUM 8.6 9.0 9.0     Recent Labs     03/22/22  1420   INR 0.92               Imaging  EC-ECHOCARDIOGRAM COMPLETE W/O CONT   Final Result      CT-HEAD W/O   Final Result      1.  There is no acute intracranial hemorrhage.   2.  Stable postoperative changes of left frontal parietal craniotomy with adjacent underlying dural thickening.   3.  Stable mild frontal atrophy.         CT-CSPINE WITHOUT PLUS RECONS   Final Result      No acute fracture or dislocation seen in the CT scan of the cervical spine.      DX-WRIST-COMPLETE 3+ RIGHT   Final Result      1.  There is no acute fracture or malalignment of the right wrist. There is minimal degenerative change in the wrist.      DX-ELBOW-LIMITED 2- RIGHT   Final Result      1.  No acute fracture or dislocation.      2.  Evidence of previous ulnar fracture is noted.      DX-SHOULDER 2+ RIGHT   Final Result      1.  There is a comminuted multipart fracture of the right lateral head and neck.           Assessment/Plan  Syncope- (present on admission)  Assessment & Plan  EKG shows sinus tachycardia with a rate of 97, there is no ST elevation, there is no significant ST depression, QTc 483.  No significant findings on tele   Echocardiography revealed no significant findings  Known hx of recent SDH post craniotomy leading to a seizure disorder    Continue seizure meds and tele monitor    Closed fracture of proximal end of humerus- (present on admission)  Assessment & Plan  Imaging shows evidence for comminuted multipart fracture of the right lateral head and neck  See above  Non surgical per orthopedic surgery  Sling supportive care  Following  PT/ OT    Lactic acidosis- (present on admission)  Assessment & Plan  Initial lactic acid of 2.9, likely  secondary to reduced intravascular volume.  Now resolved    Anemia requiring a blood transfusion- (present on admission)  Assessment & Plan  No evidence of gross bleeding.  Acute on chronic  Acute anemia 6.5 at admission.  Baseline hemoglobin 8s 2 months ago.   Transfused prbc 3/19, 3/20, 3/23  Per chart review, patient had EGD 2/2022 for persistent nausea vomitingwhich was negative for acute pathology or bleeding signs.  Known history of multiple bariatric surgeries.    Her hemoglobin has been stable at 8s after RBC transfusion.  Occult test was not able to be done due to no bowel movement for past 2 days.  .   Iron study does not show iron deficiency.  B12 was normal  2 months ago    Transfuse for a hemoglobin of less than or equal to 7     Continue to follow    Hypocalcemia- (present on admission)  Assessment & Plan  ionized ca within normal limits      SDH (subdural hematoma) (HCC)- (present on admission)  Assessment & Plan  Status post recent craniotomy leading to a seizure disorder  continue seizure medications  Seizure precautions    Hyperglycemia- (present on admission)  Assessment & Plan  History of diabetes when she was 100 pounds heavier  Continue sliding scale   glycated hemoglobin pending  Accu-Checks, hypoglycemia protocol     Thrombocytopenia ([facility])  Assessment & Plan  Chronic  Related to liver disease and transplant  In stable range  Follow intermittently    IRMA (acute kidney injury) ([facility])- (present on admission)  Assessment & Plan  Consistent dehydration on admit - resolved, stable off of iv hydration  Home diuretics held - following and will restart when appropriate - euvolemic on exam  following    Recurrent major depressive disorder, in remission (HCC)- (present on admission)  Assessment & Plan  continue home amitriptyline and sertraline    Psychiatry consulted for unstable mood.    Psych recommended to continue current medication, added  Remeron 7.5 mg nightly.  Continue to follow.     GERD  (gastroesophageal reflux disease)- (present on admission)  Assessment & Plan  continue omeprazole    Chronic respiratory failure (HCC)- (present on admission)  Assessment & Plan  On 4L at baseline  No evidence of exacerbation  following    DUC (generalized anxiety disorder)- (present on admission)  Assessment & Plan  See above  Depression and overwhelm related to health issues contributing  Psychology consulted    Hypothyroidism- (present on admission)  Assessment & Plan  Resume home levothyroxine    Status post liver transplant Dr. Canada ([facility])- (present on admission)  Assessment & Plan  Continue chronic immunosuppressive medications  Has follow up next week planned       VTE prophylaxis: SCDs/TEDs    I have performed a physical exam and reviewed and updated ROS and Plan today (3/25/2022). In review of yesterday's note (3/24/2022), there are no changes except as documented above.

## 2022-03-25 NOTE — CARE PLAN
Problem: Nutritional:  Goal: Achieve adequate nutritional intake  Description: Patient will consume >50% of meals  Outcome: Progressing     Based on limited ADL documentation, appears pt has been eating % of meals during admit.    RD continues to follow.

## 2022-03-25 NOTE — CARE PLAN
The patient is Stable - Low risk of patient condition declining or worsening    Shift Goals  Clinical Goals: pain control, walking o2  Patient Goals: pain control    Progress made toward(s) clinical / shift goals:    Problem: Pain - Standard  Goal: Alleviation of pain or a reduction in pain to the patient’s comfort goal  Outcome: Progressing  PRN pain meds per eMAR     Problem: Knowledge Deficit - Standard  Goal: Patient and family/care givers will demonstrate understanding of plan of care, disease process/condition, diagnostic tests and medications  Outcome: Progressing  Education provided     Problem: Skin Integrity  Goal: Skin integrity is maintained or improved  Outcome: Progressing  Skin integrity monitored       Patient is not progressing towards the following goals:

## 2022-03-25 NOTE — CONSULTS
BRIEF PSYCHIATRIC CONSULT NOTE:not seen as the video monitor does not work. Have contacted the help line: this is the 5 th attempt to get the issue resolved

## 2022-03-25 NOTE — PROGRESS NOTES
Lab called with critical result of WBC = 2.5 at 1711. Critical lab result read back to LAB.   Dr. BRICEÑO notified of critical lab result at 1725.  Critical lab result read back by Dr. BRICEÑO

## 2022-03-25 NOTE — DISCHARGE INSTRUCTIONS
Discharge Instructions    Discharged to home by car with relative. Discharged via wheelchair, hospital escort: Yes.  Special equipment needed: Not Applicable    Be sure to schedule a follow-up appointment with your primary care doctor or any specialists as instructed.     Discharge Plan:   Influenza Vaccine Indication: Not indicated: Previously immunized this influenza season and > 8 years of age    I understand that a diet low in cholesterol, fat, and sodium is recommended for good health. Unless I have been given specific instructions below for another diet, I accept this instruction as my diet prescription.   Other diet: renal    Special Instructions: None    · Is patient discharged on Warfarin / Coumadin?   No     Depression / Suicide Risk    As you are discharged from this [facility] facility, it is important to learn how to keep safe from harming yourself.    Recognize the warning signs:  · Abrupt changes in personality, positive or negative- including increase in energy   · Giving away possessions  · Change in eating patterns- significant weight changes-  positive or negative  · Change in sleeping patterns- unable to sleep or sleeping all the time   · Unwillingness or inability to communicate  · Depression  · Unusual sadness, discouragement and loneliness  · Talk of wanting to die  · Neglect of personal appearance   · Rebelliousness- reckless behavior  · Withdrawal from people/activities they love  · Confusion- inability to concentrate     If you or a loved one observes any of these behaviors or has concerns about self-harm, here's what you can do:  · Talk about it- your feelings and reasons for harming yourself  · Remove any means that you might use to hurt yourself (examples: pills, rope, extension cords, firearm)  · Get professional help from the community (Mental Health, Substance Abuse, psychological counseling)  · Do not be alone:Call your Safe Contact- someone whom you trust who will be there for  you.  · Call your local CRISIS HOTLINE 707-7383 or 662-304-5576  · Call your local Children's Mobile Crisis Response Team [place] (203) 000-1228 or www.Udorse  · Call the toll free National Suicide Prevention Hotlines   · National Suicide Prevention Lifeline 193-175-LSDH (4492)  · Public Mobile Line Network 800-SUICIDE (274-4598)      Acute Kidney Injury, Adult    Acute kidney injury is a sudden worsening of kidney function. The kidneys are organs that have several jobs. They filter the blood to remove waste products and extra fluid. They also maintain a healthy balance of minerals and hormones in the body, which helps control blood pressure and keep bones strong. With this condition, your kidneys do not do their jobs as well as they should.  This condition ranges from mild to severe. Over time it may develop into long-lasting (chronic) kidney disease. Early detection and treatment may prevent acute kidney injury from developing into a chronic condition.  What are the causes?  Common causes of this condition include:  · A problem with blood flow to the kidneys. This may be caused by:  ? Low blood pressure (hypotension) or shock.  ? Blood loss.  ? Heart and blood vessel (cardiovascular) disease.  ? Severe burns.  ? Liver disease.  · Direct damage to the kidneys. This may be caused by:  ? Certain medicines.  ? A kidney infection.  ? Poisoning.  ? Being around or in contact with toxic substances.  ? A surgical wound.  ? A hard, direct hit to the kidney area.  · A sudden blockage of urine flow. This may be caused by:  ? Cancer.  ? Kidney stones.  ? An enlarged prostate in males.  What are the signs or symptoms?  Symptoms of this condition may not be obvious until the condition becomes severe. Symptoms of this condition can include:  · Tiredness (lethargy), or difficulty staying awake.  · Nausea or vomiting.  · Swelling (edema) of the face, legs, ankles, or feet.  · Problems with urination, such  as:  ? Abdominal pain, or pain along the side of your stomach (flank).  ? Decreased urine production.  ? Decrease in the force of urine flow.  · Muscle twitches and cramps, especially in the legs.  · Confusion or trouble concentrating.  · Loss of appetite.  · Fever.  How is this diagnosed?  This condition may be diagnosed with tests, including:  · Blood tests.  · Urine tests.  · Imaging tests.  · A test in which a sample of tissue is removed from the kidneys to be examined under a microscope (kidney biopsy).  How is this treated?  Treatment for this condition depends on the cause and how severe the condition is. In mild cases, treatment may not be needed. The kidneys may heal on their own. In more severe cases, treatment will involve:  · Treating the cause of the kidney injury. This may involve changing any medicines you are taking or adjusting your dosage.  · Fluids. You may need specialized IV fluids to balance your body's needs.  · Having a catheter placed to drain urine and prevent blockages.  · Preventing problems from occurring. This may mean avoiding certain medicines or procedures that can cause further injury to the kidneys.  In some cases treatment may also require:  · A procedure to remove toxic wastes from the body (dialysis or continuous renal replacement therapy - CRRT).  · Surgery. This may be done to repair a torn kidney, or to remove the blockage from the urinary system.  Follow these instructions at home:  Medicines  · Take over-the-counter and prescription medicines only as told by your health care provider.  · Do not take any new medicines without your health care provider's approval. Many medicines can worsen your kidney damage.  · Do not take any vitamin and mineral supplements without your health care provider's approval. Many nutritional supplements can worsen your kidney damage.  Lifestyle  · If your health care provider prescribed changes to your diet, follow them. You may need to decrease  the amount of protein you eat.  · Achieve and maintain a healthy weight. If you need help with this, ask your health care provider.  · Start or continue an exercise plan. Try to exercise at least 30 minutes a day, 5 days a week.  · Do not use any tobacco products, such as cigarettes, chewing tobacco, and e-cigarettes. If you need help quitting, ask your health care provider.  General instructions  · Keep track of your blood pressure. Report changes in your blood pressure as told by your health care provider.  · Stay up to date with immunizations. Ask your health care provider which immunizations you need.  · Keep all follow-up visits as told by your health care provider. This is important.  Where to find more information  · American Association of Kidney Patients: www.aakp.org  · National Kidney Foundation: www.kidney.org  · American Kidney Fund: www.akfinc.org  · [employer] [facility]:  ? www.lifeoptions.org  ? www.kidneyschool.org  Contact a health care provider if:  · Your symptoms get worse.  · You develop new symptoms.  Get help right away if:  · You develop symptoms of worsening kidney disease, which include:  ? Headaches.  ? Abnormally dark or light skin.  ? Easy bruising.  ? Frequent hiccups.  ? Chest pain.  ? Shortness of breath.  ? End of menstruation in women.  ? Seizures.  ? Confusion or altered mental status.  ? Abdominal or back pain.  ? Itchiness.  · You have a fever.  · Your body is producing less urine.  · You have pain or bleeding when you urinate.  Summary  · Acute kidney injury is a sudden worsening of kidney function.  · Acute kidney injury can be caused by problems with blood flow to the kidneys, direct damage to the kidneys, and sudden blockage of urine flow.  · Symptoms of this condition may not be obvious until it becomes severe. Symptoms may include edema, lethargy, confusion, nausea or vomiting, and problems passing urine.  · This condition can usually be diagnosed with blood  tests, urine tests, and imaging tests. Sometimes a kidney biopsy is done to diagnose this condition.  · Treatment for this condition often involves treating the underlying cause. It is treated with fluids, medicines, dialysis, diet changes, or surgery.  This information is not intended to replace advice given to you by your health care provider. Make sure you discuss any questions you have with your health care provider.  Document Released: 07/02/2012 Document Revised: 11/30/2018 Document Reviewed: 12/08/2017  [employer] Patient Education © 2020 [employer]    Discharge Instructions per Thuan Reynoso M.D.    Please follow-up with PCP as outpatient. Please follow up with your regular GI doctor as outpatient.     DIET: regular diet    ACTIVITY: As tolerated    DIAGNOSIS: fall, anemia, R humerus fracture  Return to ER in the event of new or worsening symptoms. Please note importance of compliance and the patient has agreed to proceed with all medical recommendations and follow up plan indicated above. All medications come with benefits and risks. Risks may include permanent injury or death and these risks can be minimized with close reassessment and monitoring. Please make it to your scheduled follow ups with PCP and GI    Discharge Instructions    Discharged to home by car with relative. Discharged via wheelchair, hospital escort: Refused.  Special equipment needed: Not Applicable    Be sure to schedule a follow-up appointment with your primary care doctor or any specialists as instructed.     Discharge Plan:   Diet Plan: Discussed  Activity Level: Discussed  Confirmed Follow up Appointment: Patient to Call and Schedule Appointment  Confirmed Symptoms Management: Discussed  Medication Reconciliation Updated: Yes  Influenza Vaccine Indication: Not indicated: Previously immunized this influenza season and > 8 years of age    I understand that a diet low in cholesterol, fat, and sodium is recommended for good health.  Unless I have been given specific instructions below for another diet, I accept this instruction as my diet prescription.   Other diet: N/A    Special Instructions: None    · Is patient discharged on Warfarin / Coumadin?   No     Depression / Suicide Risk    As you are discharged from this [facility] facility, it is important to learn how to keep safe from harming yourself.    Recognize the warning signs:  · Abrupt changes in personality, positive or negative- including increase in energy   · Giving away possessions  · Change in eating patterns- significant weight changes-  positive or negative  · Change in sleeping patterns- unable to sleep or sleeping all the time   · Unwillingness or inability to communicate  · Depression  · Unusual sadness, discouragement and loneliness  · Talk of wanting to die  · Neglect of personal appearance   · Rebelliousness- reckless behavior  · Withdrawal from people/activities they love  · Confusion- inability to concentrate     If you or a loved one observes any of these behaviors or has concerns about self-harm, here's what you can do:  · Talk about it- your feelings and reasons for harming yourself  · Remove any means that you might use to hurt yourself (examples: pills, rope, extension cords, firearm)  · Get professional help from the community (Mental Health, Substance Abuse, psychological counseling)  · Do not be alone:Call your Safe Contact- someone whom you trust who will be there for you.  · Call your local CRISIS HOTLINE 666-9947 or 051-225-7117  · Call your local Children's Mobile Crisis Response Team [place] (050) 593-9006 or www.WISeKey  · Call the toll free National Suicide Prevention Hotlines   · National Suicide Prevention Lifeline 459-933-IKPW (3425)  · National Hope Line Network 800-SUICIDE (837-9810)

## 2022-03-25 NOTE — PROGRESS NOTES
Telemetry Shift Summary     Rhythm: SR  Rate: 73-92  Measurements: .16/.08/.36  Ectopy (reported by Monitor Tech): R PVC     Normal Values  Rhythm: Sinus  HR:   Measurements: 0.12-0.20/0.06-0.10/0.30-0.52

## 2022-03-26 NOTE — ED NOTES
Patient ambulates to bathroom but unable to urinate. Back to bed and commode brought in room. Patient drinking some water. Pain med given.

## 2022-03-26 NOTE — ED TRIAGE NOTES
"Chief Complaint   Patient presents with   • Arm Pain     Pt has a fractured arm that she was previously admitted for, states that pain has just not been managed at home    • Urinary Frequency     Pt reports that she has been getting up every 30-45 minutes to urinate      /76   Pulse 93   Temp 37.3 °C (99.1 °F) (Temporal)   Resp 20   Ht 1.753 m (5' 9\")   Wt 65.8 kg (145 lb)   LMP 01/03/2000   SpO2 96%   BMI 21.41 kg/m²     Has this patient been vaccinated for COVID yes  If not, would they like to be vaccinated while in the ER if eligible?  no  Would the patient like to speak with the ERP about the possibility of receiving the COVID vaccine today before making a decision? no  "

## 2022-03-26 NOTE — ED PROVIDER NOTES
"ED Provider Note    ED Provider Note    Primary care provider: Elisa Phillip M.D.  Means of arrival: POV  History obtained from: Patient    CHIEF COMPLAINT  Chief Complaint   Patient presents with   • Arm Pain     Pt has a fractured arm that she was previously admitted for, states that pain has just not been managed at home    • Urinary Frequency     Pt reports that she has been getting up every 30-45 minutes to urinate      Seen at 3:08 PM.   HPI  Roxana Cuba is a 62 y.o. female who presents to the Emergency Department for polyuria.  The patient states that she does not feel well, she has to void every hour and this began shortly after her discharge from our facility at 4:30 PM yesterday afternoon.  She states that she is tired and she could not sleep because she had to void.    She is also out of her pain medications and has significant right shoulder pain from a fracture.  She is concerned that there is significant bruising in the right upper extremity where the fracture was.    She reports that she has felt dizzy for over a month.  She feels chronically short of breath.    She does have some difficulty describing her symptoms and does not appear to be the best historian.    REVIEW OF SYSTEMS  See HPI, denies fevers, states that she has had \"low-grade fevers \"but cannot give me a number.  She denies any nausea, vomiting, diarrhea, constipation.  When asked if she has dysuria, she states that she does not know.  She denies any new numbness or new focal weakness.  Remainder review of systems negative.    PAST MEDICAL HISTORY   has a past medical history of Anemia, Anesthesia, Breath shortness, Bronchitis ( ), Cardiomegaly, Chronic fatigue and malaise, Chronic obstructive pulmonary disease (HCC), Cirrhosis (HCC) (December 2011), CKD (chronic kidney disease) stage 3, GFR 30-59 ml/min ([facility]), Diabetes (HCC), Fracture of left foot, GERD (gastroesophageal reflux disease), H/O Clostridium difficile infection " (02-17-17), Hemorrhagic disorder (HCC), Hiatus hernia syndrome, High cholesterol, Hypothyroid, Jaundice (2009), Liver transplanted (HCC), Low back pain (02-17-17), Mild aortic regurgitation and aortic valve sclerosis ( ), On home oxygen therapy, Platelet disorder (HCC), Pneumonia, Psychiatric disorder, Pulmonary hypertension (HCC), S/P cholecystectomy, Small bowel obstruction (HCC), Splenomegaly, and VRE (vancomycin-resistant Enterococci).    SURGICAL HISTORY   has a past surgical history that includes anesth,nose,sinus surgery; makayla by laparoscopy (9/19/2009); exam under anesthesia (11/11/2009); hysterectomy, total abdominal; gastroscopy-endo (3/12/2010); bronchoscopy-endo (5/29/2012); bronchoscopy-endo (6/19/2012); sigmoidoscopy flex (9/26/2012); recovery (2/27/2013); bronchoscopy-endo (11/15/2013); recovery (1/21/2014); recovery (3/24/2014); hemorrhoidectomy (11/11/2009); gastroscopy (9/28/2012); carpal tunnel release; bone marrow aspiration (12/31/2012); bone marrow biopsy, ndl/trocar (12/31/2012); recovery (3/31/2014); other abdominal surgery (December 2011); bone marrow aspiration (3/16/2015); bone marrow biopsy, ndl/trocar (3/16/2015); lung biopsy open (11/2016); tonsillectomy; other abdominal surgery (); breast biopsy (Left, 2/21/2017); colonoscopy (N/A, 3/27/2017); gastroscopy (N/A, 3/27/2017); gastric bypass laparoscopic; hernia repair; makayla by laparoscopy; other; other (12/11/2017); other; turbinate reduction (Bilateral, 10/4/2018); nasal reconstruction (Bilateral, 10/4/2018); ventral hernia repair robotic xi (N/A, 11/12/2018); craniotomy (Left, 8/4/2021); and upper gi endoscopy,diagnosis (N/A, 2/3/2022).    SOCIAL HISTORY  Social History     Tobacco Use   • Smoking status: Never Smoker   • Smokeless tobacco: Never Used   • Tobacco comment: avoid all tobacco products   Vaping Use   • Vaping Use: Never used   Substance Use Topics   • Alcohol use: Not Currently     Alcohol/week: 0.0 oz   • Drug  "use: No      Social History     Substance and Sexual Activity   Drug Use No       FAMILY HISTORY  Family History   Problem Relation Age of Onset   • Other Father         Unknown (dead 10 years)   • Diabetes Father    • Heart Disease Father    • Hypertension Father    • Hyperlipidemia Father    • Stroke Father    • Non-contributory Mother    • Hyperlipidemia Mother    • Alcohol/Drug Mother    • Cancer Paternal Aunt    • Alcohol/Drug Maternal Grandmother    • Alcohol/Drug Maternal Grandfather        CURRENT MEDICATIONS  Reviewed.  See Encounter Summary.     ALLERGIES  Allergies   Allergen Reactions   • Nsaids Unspecified     Can not take due to hx of liver transplant    • Rhubarb Anaphylaxis   • Organic Nitrates Unspecified     Nitroglycerin products should be avoided with the use of PDE-5 inhibitors as the combination can result in severe hypotension.  RD clarified with pt: Nitrates in food are okay and pt does not want nitrates to be listed as food allergy. Pt only avoids medications with nitrates.    • Other Drug      Any medication that may interact with cyclosporine, tacrolimus, sirolimus, or prograf (due to hx of liver transplant)        PHYSICAL EXAM  VITAL SIGNS: /95   Pulse 91   Temp 37 °C (98.6 °F) (Temporal)   Resp 16   Ht 1.753 m (5' 9\")   Wt 65.8 kg (145 lb)   LMP 01/03/2000   SpO2 94%   BMI 21.41 kg/m²   Constitutional: Awake, alert in no apparent distress.  Chronically ill-appearing.  HENT: Normocephalic, Bilateral external ears normal. Nose normal.   Eyes: Conjunctiva normal, non-icteric, EOMI.    Thorax & Lungs: Easy unlabored respirations, Clear to ascultation bilaterally.  Cardiovascular: Regular rate, Regular rhythm, No murmurs, rubs or gallops. Bilateral pulses symmetrical.   Abdomen:  Soft, nontender, nondistended, normal active bowel sounds.   :    Skin: Visualized skin is  Dry, No erythema, No rash.   Musculoskeletal:   Right extremity: Compartments are soft, there is dependent " ecchymosis at the anterior distal part of the humerus, brisk distal pulses.    Neurologic: Alert, Grossly non-focal.   Psychiatric: Normal affect, Normal mood  Lymphatic:  No cervical LAD      RADIOLOGY  No orders to display         COURSE & MEDICAL DECISION MAKING  Pertinent Labs & Imaging studies reviewed. (See chart for details)    3:08 PM - Medical record reviewed, patient was admitted 3/19 for fatigue, fall, right humerus fracture, symptomatic anemia.  She was evaluated by Dr. Irwin, determined to be nonoperative management for the humerus fracture.  She does have a history of chronic pain and is on oxycodone 10 mg for which she fills 60 on a monthly basis.  Last fill was about 1 month ago.  The discharge summary recommended a postacute rehab placement which the patient adamantly denied at the time and was discharged home with home health.  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    Decision Making:  This is a pleasant 62 y.o. year old female who presents with arm pain and polyuria.  The patient does have a very complicated history.  She is not the best historian and the majority of her concerns today are quite chronic.  She was discharged from our facility less than 24 hours ago.  I did not see any need to repeat the labs based on the chronicity of symptoms.  Her primary concern was the right arm.  She is out of her opioid analgesia.  I can give her a 3-day course of this.  There is no sign of compartment syndrome, the ecchymosis that she demonstrates on the antecubital fossa region is not surprising given the fracture that is proximal to this area.  This also was present while she was in the hospital as it appears several days old.    As for the polyuria, she does not have glucosuria, she does not have any sign of infection.  She does not have any abdominal tenderness.  I will treat her for overactive bladder with a prescription for Ditropan.  Recommend she  follow-up with her primary care physician for additional management.    Discharge Medications:  Discharge Medication List as of 3/26/2022  5:18 PM      START taking these medications    Details   oxyCODONE-acetaminophen (PERCOCET) 5-325 MG Tab Take 1 Tablet by mouth every four hours as needed for up to 3 days., Disp-18 Tablet, R-0, Normal      oxybutynin SR (DITROPAN-XL) 10 MG CR tablet Take 1 Tablet by mouth every day., Disp-30 Tablet, R-0, Normal             The patient was discharged home (see d/c instructions) was told to return immediately for any signs or symptoms listed, or any worsening at all.  The patient verbally agreed to the discharge precautions and follow-up plan which is documented in EPIC.        FINAL IMPRESSION  1. Closed nondisplaced comminuted fracture of shaft of right humerus with routine healing, subsequent encounter    2. Polyuria

## 2022-03-26 NOTE — PROGRESS NOTES
Discharge instructions given and discussed, signed copy in chart. Pt verbalized understanding and all questions answered. New  prescriptions sent to pharmacy of choice. Pt discharged via wheelchair to car in stable condition on 4LO2 via NC escorted by RNs Josemanuel and José Miguel. Personal belongings accounted for and in possession of . IV removed and tolerated well. Tele box removed, monitor room notified.

## 2022-03-27 NOTE — ED NOTES
Discharged to home with instructions to follow up with her doctor. Prescriptions sent to pharmacy. Patient to car via wheelchair.  to drive her home.

## 2022-03-29 PROBLEM — N17.9 AKI (ACUTE KIDNEY INJURY) (HCC): Status: RESOLVED | Noted: 2018-01-20 | Resolved: 2022-01-01

## 2022-03-29 NOTE — PROGRESS NOTES
Subjective:     Chief Complaint   Patient presents with   • Hospital Follow-up         HPI:   Roxana presents today for follow up but also has had an admission and another ER visit since our last visit. She had a GLF on 3/19/22 and ended up with a broken humeral neck. Nonsurgical, ortho consulted. Also was found to have anemia, with Hgb 6, usual is 8. She was given 3 units, and only cale to about 8 hgb.   On 3/26/22 she did go back to the ED due to pain in her fractured arm, and also increased urinary frequency. She was guiven 3 days of pain meds, and also ditropan for overactive bladder. No infection found.   During admission she did have psych consult which added remeron at bedtime.     Last visit with me we discussed using clonazepam regularly rather than xanax for her anxiety. She does note some improvement. She did have remeron added during hospital stay and this is ok as well.     With regard to urinary frequency this maybe has calmed down since 2 nights ago or so.  No side effects with this really.   She did see her liver specialist yesterday and they mentioned possibly stopping cellcept due to blood counts. She does see Dr Reza in a few weeks.   Dr Reza wants to do a bone marrow biopsy possibly based on next labs.       Current Outpatient Medications Ordered in Epic   Medication Sig Dispense Refill   • morphine ER (MS CONTIN) 15 MG Tab CR tablet Take 1 Tablet by mouth every 12 hours for 30 days. 60 Tablet 0   • oxyCODONE immediate release (ROXICODONE) 10 MG immediate release tablet Take 1 Tablet by mouth 2 (two) times a day for 30 days. Take 1 tablet by mouth every 6-8 for moderate to severe pain 60 Tablet 0   • oxybutynin SR (DITROPAN-XL) 10 MG CR tablet Take 1 Tablet by mouth every day. 30 Tablet 0   • mirtazapine (REMERON) 15 MG TABLET DISPERSIBLE Take 0.5 Tablets by mouth at bedtime for 30 days. 15 Tablet 0   • levetiracetam (KEPPRA) 750 MG tablet Take 750 mg by mouth 2 times a day.     •  metoclopramide (REGLAN) 10 MG Tab Take 10 mg by mouth 2 times a day.     • CALCIUM-VITAMIN D PO Take 1 Tablet by mouth every evening.     • magnesium oxide (MAG-OX) 400 MG Tab tablet Take 400 mg by mouth every day.     • ondansetron (ZOFRAN ODT) 4 MG TABLET DISPERSIBLE DISSOLVE 1 TAB ON TONGUE EVERY 8 HOURS AS NEEDED FOR NAUSEA 90 Tablet 1   • levothyroxine (SYNTHROID) 137 MCG Tab Take 1 Tablet by mouth every morning on an empty stomach. Indications: Underactive Thyroid 90 Tablet 3   • lacosamide (VIMPAT) 100 MG Tab tablet Take 1 Tablet by mouth 2 times a day for 30 days. 60 Tablet 0   • Galcanezumab-gnlm (EMGALITY) 120 MG/ML Solution Auto-injector Inject 1 PEN under the skin every 4 weeks. 1 Each 5   • clonazePAM (KLONOPIN) 0.5 MG Tab Take 1 Tablet by mouth 3 times a day for 30 days. 90 Tablet 3   • amitriptyline (ELAVIL) 25 MG Tab Take 25 mg by mouth every evening.     • sertraline (ZOLOFT) 100 MG Tab Take 1.5 Tablets by mouth every day. 135 Tablet 3   • Ascorbic Acid (VITAMIN C) 500 MG Chew Tab Chew 500 mg every day. Indications: Inadequate Vitamin C     • fludrocortisone (FLORINEF) 0.1 MG Tab Take 0.1 mg by mouth every day. Indications: Blood Pressure Drop Upon Standing     • potassium chloride SA (KDUR) 20 MEQ Tab CR Take 1 Tablet by mouth every day. 90 Tablet 3   • albuterol (PROAIR HFA) 108 (90 Base) MCG/ACT Aero Soln inhalation aerosol Inhale 2 Puffs every four hours as needed for Shortness of Breath (wheezing). 8.5 g 3   • furosemide (LASIX) 20 MG Tab Take 1 Tablet by mouth every day. Indications: Edema 30 Tablet 0   • ambrisentan (LETAIRIS) 10 MG tablet TAKE 1 TABLET BY MOUTH EVERY DAY 30 Tablet 11   • tacrolimus (PROGRAF) 1 MG Cap Take 2 mg by mouth 2 times a day. Indications: Liver Transplant Recipient     • tadalafil (CIALIS) 20 MG tablet Take 1 Tablet by mouth every day. 90 Tablet 3   • omeprazole (PRILOSEC) 40 MG delayed-release capsule Take 1 Capsule by mouth every day. 90 Capsule 3   • pravastatin  "(PRAVACHOL) 20 MG Tab Take 1 Tablet by mouth every day. 90 Tablet 3   • mycophenolate (CELLCEPT) 250 MG Cap Take 250 mg by mouth 2 times a day. Indications: Liver Transplant Recipient     • therapeutic multivitamin-minerals (THERAGRAN-M) Tab Take 1 Tablet by mouth every day. Indications: suppliment       No current Epic-ordered facility-administered medications on file.         ROS:  Gen: no fevers/chills, no changes in weight  Eyes: no changes in vision  ENT: no sore throat, no hearing loss, no bloody nose  Pulm: no sob, no cough  CV: no chest pain, no palpitations  GI: no nausea/vomiting, no diarrhea  : no dysuria  MSk: no myalgias  Skin: no rash  Neuro: no headaches, no numbness/tingling  Heme/Lymph: no easy bruising      Objective:     Exam:  /70 (BP Location: Left arm, Patient Position: Sitting, BP Cuff Size: Adult)   Pulse 78   Temp 36.4 °C (97.6 °F) (Temporal)   Resp 18   Ht 1.753 m (5' 9\")   Wt 59.9 kg (132 lb)   LMP 01/03/2000   SpO2 98% Comment: 4 L O2  BMI 19.49 kg/m²  Body mass index is 19.49 kg/m².  Gen: AAOx3, NAD, well appearing  HEENT: NCAT, EOMI, Nares patent, Mucosa moist  Resp: Normal chest wall rise and fall, not SOB, no tachypnea  Skin: no rash or abnormality of visible skin.   Psych: normal speech, not slurred, good insight, affect full  MSK: Moves all four limbs equally and normally, gait normal        Assessment & Plan:     62 y.o. female with the following -     1. Chronic respiratory failure with hypoxia (HCC)  Chronic, stable.  Continue current supplemental oxygen.    2. Closed fracture of proximal end of right humerus, unspecified fracture morphology, initial encounter  Discussed this is the biggest part of our visit today.  I do think pain control is important.  She started paying co-pays and seeing pain management only for prescriptions.  She is not interested in any procedural or surgical procedures or injections at this time.  We will see if we get her better " controlled on an extended release opiate as well as infrequent breakthrough medication with this recent fracture.  - morphine ER (MS CONTIN) 15 MG Tab CR tablet; Take 1 Tablet by mouth every 12 hours for 30 days.  Dispense: 60 Tablet; Refill: 0  - oxyCODONE immediate release (ROXICODONE) 10 MG immediate release tablet; Take 1 Tablet by mouth 2 (two) times a day for 30 days. Take 1 tablet by mouth every 6-8 for moderate to severe pain  Dispense: 60 Tablet; Refill: 0    3. DUC (generalized anxiety disorder)  Continue use of Remeron at nighttime.  Continue adjustments with increased sertraline as well as using her clonazepam as directed.    4. Hepatopulmonary syndrome (HCC)  Chronic, stable.    5. Chronic pain syndrome  Chronic, stable.    6. Age-related osteoporosis with current pathological fracture, sequela  She thinks that she had a bone density about 10 years ago or so.  She is not sure if this was at [facility] or [facility].  We will see if we can get a hold of her last bone density but also given this recent fracture we will get her set up for an updated scan.  - DS-BONE DENSITY STUDY (DEXA); Future    7. Immunocompromised state (HCC)  Chronic, stable.  Follow-up with Dr. Reza as directed.    8. MGUS (monoclonal gammopathy of unknown significance)  See above.    9. Recurrent major depressive disorder, in remission (HCC)  Continue medications as mentioned above.    10. Seizures ([facility])  Continue with plans from neurology and Dr. Mc.    11. Thrombocytopenia ([facility])  Continue seeing Dr. Reza.      We will see her back after she sees Dr. Reza to catch up on how she is doing both with pain but also what the plan is for her MGUS.  She thinks they might want to do a bone marrow biopsy.      No follow-ups on file.    Please note that this dictation was created using voice recognition software. I have made every reasonable attempt to correct obvious errors, but I expect that there are errors of grammar and possibly  content that I did not discover before finalizing the note.

## 2022-04-13 NOTE — PROGRESS NOTES
"Chief Complaint   Patient presents with   • Pulmonary Hypertension     F/V DX: Pulmonary hypertension (CMS-HCC)        Subjective     Roxana Cuba is a 62 y.o. female who presents today A couple months ago during hospitalization was normal.as a follow-up.      Since she was last seen she fell and broke her shoulder.  She is in significant pain.  She has run out of her or is concerned she is running low on her pain medications.  She is otherwise has no shortness of breath or chest pain.    Past Medical History:   Diagnosis Date   • Anemia    • Anesthesia     \"takes more to get me under, would rather be intubated\"    • Breath shortness     cont oxygen 5L (Preffered)   • Bronchitis      2016   • Cardiomegaly    • Chronic fatigue and malaise    • Chronic obstructive pulmonary disease (HCC)    • Cirrhosis ([facility]) December 2011    Status post liver transplant at [facility]   • CKD (chronic kidney disease) stage 3, GFR 30-59 ml/min ([facility])    • Diabetes ([facility])     reports hx of, resolved w/weight loss. Reports still checking bloodsugars twice daily and using insulin PRN   • Fracture of left foot    • GERD (gastroesophageal reflux disease)         • H/O Clostridium difficile infection 02-17-17    reports \"16 months ago\". Current stool sample 01-26-17 neg   • Hemorrhagic disorder ([facility])     \"low platelets\" bruises easily    • Hiatus hernia syndrome    • High cholesterol    • Hypothyroid    • Jaundice 2009   • Liver transplanted ([facility])    • Low back pain 02-17-17    and left foot, 7/10   • Mild aortic regurgitation and aortic valve sclerosis     • On home oxygen therapy     5 liters, Dr. Gaming   • Platelet disorder ([facility])     low platelets   • Pneumonia     aspiration,    • Psychiatric disorder     Mood disorder   • Pulmonary hypertension ([facility])        • S/P cholecystectomy    • Small bowel obstruction ([facility])     01-   • Splenomegaly    • VRE (vancomycin-resistant Enterococci)     02-17-17, pt declines knowledge of " this     Past Surgical History:   Procedure Laterality Date   • LA UPPER GI ENDOSCOPY,DIAGNOSIS N/A 2/3/2022    Procedure: GASTROSCOPY;  Surgeon: Aravind Ricahrds M.D.;  Location: SURGERY SAME DAY [facility];  Service: Gastroenterology   • CRANIOTOMY Left 8/4/2021    Procedure: CRANIOTOMY;  Surgeon: Tramaine Arnold III, M.D.;  Location: SURGERY [facility];  Service: Neurosurgery   • VENTRAL HERNIA REPAIR ROBOTIC XI N/A 11/12/2018    Procedure: VENTRAL HERNIA REPAIR ROBOTIC XI- FOR EPIGASTRIC INCISIONAL;  Surgeon: John H Ganser, M.D.;  Location: SURGERY [facility];  Service: General   • TURBINATE REDUCTION Bilateral 10/4/2018    Procedure: TURBINATE REDUCTION;  Surgeon: Silviano Erazo M.D.;  Location: SURGERY SAME DAY [facility];  Service: Ent   • NASAL RECONSTRUCTION Bilateral 10/4/2018    Procedure: NASAL RECONSTRUCTION- LATERA IMPLANTS;  Surgeon: Silviaon Erazo M.D.;  Location: SURGERY SAME DAY [facility];  Service: Ent   • OTHER  12/11/2017    paniculectomy   • COLONOSCOPY N/A 3/27/2017    Procedure: COLONOSCOPY;  Surgeon: Osman Matt M.D.;  Location: SURGERY SAME DAY [facility];  Service:    • GASTROSCOPY N/A 3/27/2017    Procedure: GASTROSCOPY;  Surgeon: Osman Matt M.D.;  Location: SURGERY SAME DAY [facility];  Service:    • BREAST BIOPSY Left 2/21/2017    Procedure: BREAST BIOPSY - WIRE LOCALIZED EXCISONAL ;  Surgeon: Jane Zhao M.D.;  Location: SURGERY SAME DAY [facility];  Service:    • LUNG BIOPSY OPEN  11/2016   • OTHER ABDOMINAL SURGERY      Gasric Sleeve   • BONE MARROW ASPIRATION  3/16/2015    Performed by Freddie Hi M.D. at ENDOSCOPY [facility]   • BONE MARROW BIOPSY, NDL/TROCAR  3/16/2015    Performed by Freddie Hi M.D. at ENDOSCOPY [facility]   • RECOVERY  3/31/2014    Performed by [facility] at [facility]   • RECOVERY  3/24/2014    Performed by Cath[facility]Recovery Surgery at [facility] SAME [facility]S   • RECOVERY  1/21/2014     Performed by [facility] at SURGERY SAME DAY [facility]   • BRONCHOSCOPY-ENDO  11/15/2013    Performed by Ha Perez M.D. at ENDOSCOPY [facility]   • RECOVERY  2/27/2013    Performed by [facility]Recovery Surgery at SURGERY SAME DAY [facility]   • BONE MARROW ASPIRATION  12/31/2012    Performed by Josemanuel Real M.D. at ENDOSCOPY [facility]   • BONE MARROW BIOPSY, NDL/TROCAR  12/31/2012    Performed by Josemanuel Real M.D. at ENDOSCOPY [facility]   • GASTROSCOPY  9/28/2012    Performed by Aravind Richards M.D. at SURGERY [facility]   • SIGMOIDOSCOPY FLEX  9/26/2012    Performed by Kristopher Cardoso M.D. at ENDOSCOPY [facility]   • BRONCHOSCOPY-ENDO  6/19/2012    Performed by MARLENA MURILLO at ENDOSCOPY [facility]   • BRONCHOSCOPY-ENDO  5/29/2012    Performed by SUYAPA CAMARENA at ENDOSCOPY [facility]   • OTHER ABDOMINAL SURGERY  December 2011    Liver transplant at [facility] by Dr. Canada.   • GASTROSCOPY-ENDO  3/12/2010    Performed by CAMELIA SAMANO at ENDOSCOPY [facility]   • EXAM UNDER ANESTHESIA  11/11/2009    Performed by BIRD ABDI at SURGERY [facility]   • HEMORRHOIDECTOMY  11/11/2009    Performed by BIRD ABDI at SURGERY [facility]   • KELBY BY LAPAROSCOPY  9/19/2009    Performed by BIRD ABDI at SURGERY [facility]   • CARPAL TUNNEL RELEASE          • KELBY BY LAPAROSCOPY     • GASTRIC BYPASS LAPAROSCOPIC     • HERNIA REPAIR      x3   • HYSTERECTOMY, TOTAL ABDOMINAL          • OTHER      Breast reduction   • OTHER      liver transplant   • SC ANESTH,NOSE,SINUS SURGERY      x4   • TONSILLECTOMY       Family History   Problem Relation Age of Onset   • Other Father         Unknown (dead 10 years)   • Diabetes Father    • Heart Disease Father    • Hypertension Father    • Hyperlipidemia Father    • Stroke Father    • Non-contributory Mother    • Hyperlipidemia Mother    • Alcohol/Drug Mother    • Cancer Paternal Aunt    •  Alcohol/Drug Maternal Grandmother    • Alcohol/Drug Maternal Grandfather      Social History     Socioeconomic History   • Marital status:      Spouse name: Not on file   • Number of children: Not on file   • Years of education: Not on file   • Highest education level: Not on file   Occupational History   • Not on file   Tobacco Use   • Smoking status: Never Smoker   • Smokeless tobacco: Never Used   • Tobacco comment: avoid all tobacco products   Vaping Use   • Vaping Use: Never used   Substance and Sexual Activity   • Alcohol use: Not Currently     Alcohol/week: 0.0 oz   • Drug use: No   • Sexual activity: Not on file   Other Topics Concern   • Primary/coprimary nurse & associates Not Asked   • Family contact information Not Asked   • OK to release patient information to the following Not Asked   • Patient preferred routine/Privacy concerns Not Asked   • Patient likes and dislikes Not Asked   • Participating In Research Study Not Asked   • Miscellaneous Not Asked   Social History Narrative   • Not on file     Social Determinants of Health     Financial Resource Strain: Not on file   Food Insecurity: Not on file   Transportation Needs: Not on file   Physical Activity: Not on file   Stress: Not on file   Social Connections: Not on file   Intimate Partner Violence: Not on file   Housing Stability: Not on file     Allergies   Allergen Reactions   • Nsaids Unspecified     Can not take due to hx of liver transplant    • Rhubarb Anaphylaxis   • Organic Nitrates Unspecified     Nitroglycerin products should be avoided with the use of PDE-5 inhibitors as the combination can result in severe hypotension.  RD clarified with pt: Nitrates in food are okay and pt does not want nitrates to be listed as food allergy. Pt only avoids medications with nitrates.    • Other Drug      Any medication that may interact with cyclosporine, tacrolimus, sirolimus, or prograf (due to hx of liver transplant)      Outpatient Encounter  Medications as of 4/13/2022   Medication Sig Dispense Refill   • sertraline (ZOLOFT) 100 MG Tab Take 1.5 Tablets by mouth every day. 135 Tablet 3   • fludrocortisone (FLORINEF) 0.1 MG Tab Take 1 Tablet by mouth every day for 90 days. Indications: Blood Pressure Drop Upon Standing 90 Tablet 2   • tadalafil (CIALIS) 20 MG tablet Take 1 Tablet by mouth every day. 90 Tablet 3   • morphine ER (MS CONTIN) 15 MG Tab CR tablet Take 1 Tablet by mouth every 12 hours for 30 days. 60 Tablet 0   • oxyCODONE immediate release (ROXICODONE) 10 MG immediate release tablet Take 1 Tablet by mouth 2 (two) times a day for 30 days. Take 1 tablet by mouth every 6-8 for moderate to severe pain 60 Tablet 0   • oxybutynin SR (DITROPAN-XL) 10 MG CR tablet Take 1 Tablet by mouth every day. 30 Tablet 0   • mirtazapine (REMERON) 15 MG TABLET DISPERSIBLE Take 0.5 Tablets by mouth at bedtime for 30 days. 15 Tablet 0   • levetiracetam (KEPPRA) 750 MG tablet Take 750 mg by mouth 2 times a day.     • metoclopramide (REGLAN) 10 MG Tab Take 10 mg by mouth 2 times a day.     • CALCIUM-VITAMIN D PO Take 1 Tablet by mouth every evening.     • magnesium oxide (MAG-OX) 400 MG Tab tablet Take 400 mg by mouth every day.     • ondansetron (ZOFRAN ODT) 4 MG TABLET DISPERSIBLE DISSOLVE 1 TAB ON TONGUE EVERY 8 HOURS AS NEEDED FOR NAUSEA 90 Tablet 1   • levothyroxine (SYNTHROID) 137 MCG Tab Take 1 Tablet by mouth every morning on an empty stomach. Indications: Underactive Thyroid 90 Tablet 3   • Galcanezumab-gnlm (EMGALITY) 120 MG/ML Solution Auto-injector Inject 1 PEN under the skin every 4 weeks. 1 Each 5   • amitriptyline (ELAVIL) 25 MG Tab Take 25 mg by mouth every evening.     • Ascorbic Acid (VITAMIN C) 500 MG Chew Tab Chew 500 mg every day. Indications: Inadequate Vitamin C     • potassium chloride SA (KDUR) 20 MEQ Tab CR Take 1 Tablet by mouth every day. 90 Tablet 3   • albuterol (PROAIR HFA) 108 (90 Base) MCG/ACT Aero Soln inhalation aerosol Inhale 2  "Puffs every four hours as needed for Shortness of Breath (wheezing). 8.5 g 3   • furosemide (LASIX) 20 MG Tab Take 1 Tablet by mouth every day. Indications: Edema 30 Tablet 0   • ambrisentan (LETAIRIS) 10 MG tablet TAKE 1 TABLET BY MOUTH EVERY DAY 30 Tablet 11   • tacrolimus (PROGRAF) 1 MG Cap Take 2 mg by mouth 2 times a day. Indications: Liver Transplant Recipient     • omeprazole (PRILOSEC) 40 MG delayed-release capsule Take 1 Capsule by mouth every day. 90 Capsule 3   • pravastatin (PRAVACHOL) 20 MG Tab Take 1 Tablet by mouth every day. 90 Tablet 3   • mycophenolate (CELLCEPT) 250 MG Cap Take 250 mg by mouth 2 times a day. Indications: Liver Transplant Recipient     • therapeutic multivitamin-minerals (THERAGRAN-M) Tab Take 1 Tablet by mouth every day. Indications: suppliment     • [DISCONTINUED] tadalafil (CIALIS) 20 MG tablet Take 1 Tablet by mouth every day. 90 Tablet 3     No facility-administered encounter medications on file as of 4/13/2022.     Review of Systems   Constitutional: Negative.  Negative for chills, fever and malaise/fatigue.   HENT: Negative.  Negative for sore throat.    Eyes: Negative.    Respiratory: Negative.  Negative for cough, hemoptysis, sputum production, shortness of breath, wheezing and stridor.    Cardiovascular: Negative.  Negative for chest pain, palpitations, orthopnea, claudication, leg swelling and PND.   Gastrointestinal: Negative.    Genitourinary: Negative.    Musculoskeletal: Negative.    Skin: Negative.    Neurological: Negative.  Negative for dizziness, loss of consciousness and weakness.   Endo/Heme/Allergies: Negative.  Does not bruise/bleed easily.   All other systems reviewed and are negative.             Objective     /70 (BP Location: Left arm, Patient Position: Sitting, BP Cuff Size: Adult)   Pulse 86   Resp 15   Ht 1.753 m (5' 9\")   Wt 60 kg (132 lb 3.2 oz)   LMP 01/03/2000   SpO2 95%   BMI 19.52 kg/m²     Physical Exam  Vitals and nursing note " reviewed.   Constitutional:       General: She is not in acute distress.     Appearance: She is well-developed. She is not diaphoretic.   HENT:      Head: Normocephalic and atraumatic.      Right Ear: External ear normal.      Left Ear: External ear normal.      Nose: Nose normal.      Mouth/Throat:      Pharynx: No oropharyngeal exudate.   Eyes:      General: No scleral icterus.        Right eye: No discharge.         Left eye: No discharge.      Conjunctiva/sclera: Conjunctivae normal.      Pupils: Pupils are equal, round, and reactive to light.   Neck:      Vascular: No JVD.   Cardiovascular:      Rate and Rhythm: Normal rate and regular rhythm.      Heart sounds: No murmur heard.    No friction rub. No gallop.   Pulmonary:      Effort: Pulmonary effort is normal. No respiratory distress.      Breath sounds: No stridor. No wheezing or rales.   Chest:      Chest wall: No tenderness.   Abdominal:      General: There is no distension.      Palpations: Abdomen is soft.      Tenderness: There is no guarding.   Musculoskeletal:         General: No tenderness or deformity. Normal range of motion.      Cervical back: Neck supple.   Skin:     General: Skin is warm and dry.      Coloration: Skin is not pale.      Findings: No erythema or rash.   Neurological:      Mental Status: She is alert.      Cranial Nerves: No cranial nerve deficit.      Motor: No abnormal muscle tone.      Coordination: Coordination normal.      Deep Tendon Reflexes: Reflexes are normal and symmetric. Reflexes normal.   Psychiatric:         Behavior: Behavior normal.         Thought Content: Thought content normal.         Judgment: Judgment normal.                Assessment & Plan     1. Pure hypercholesterolemia  tadalafil (CIALIS) 20 MG tablet   2. Mild aortic regurgitation and aortic valve sclerosis     3. Adrenal insufficiency (HCC)     4. Interstitial lung disease (HCC)  tadalafil (CIALIS) 20 MG tablet   5. Hepatopulmonary syndrome (HCC)      6. Ostium secundum type atrial septal defect, S/P percutaneous closure     7. Chronic respiratory failure with hypoxia (HCC)     8. Mild mitral regurgitation     9. Splenomegaly     10. Steroid-induced hyperglycemia  tadalafil (CIALIS) 20 MG tablet   11. S/P bariatric surgery     12. Slow transit constipation     13. Primary pulmonary hypertension (HCC)  tadalafil (CIALIS) 20 MG tablet   14. SDH (subdural hematoma) (HCC)     15. Hypernatremia     16. Other hypervolemia     17. Moderate protein-calorie malnutrition (HCC)     18. Syncope, unspecified syncope type     19. Sarcoidosis (HCC)     20. Status post liver transplant Dr. Canada ([facility])  tadalafil (CIALIS) 20 MG tablet   21. Hyperglycemia     22. Pancytopenia (HCC)     23. Closed fracture of right elbow with routine healing         Medical Decision Making: Today's Assessment/Status/Plan:      62-year-old female with hepatopulmonary pulmonary hypertension status post orthotopic liver transplant now with normal PA pressures on sildenafil and Latera's.     I have sent a note to her primary care physician asking him to be vigilant about her pain management.  Since she is nearly crying today due to the pain in her shoulder I did refer her back for better pain management to her primary.  I also recommended that she go to the [facility] for potential second opinion regarding her shoulder.  Otherwise I refilled some of her medications.  I will see her back in 6 months.

## 2022-04-16 NOTE — ED PROVIDER NOTES
ED Provider Note    CHIEF COMPLAINT  Chief Complaint   Patient presents with   • Arm Pain   • Shoulder Pain       HPI  Patient is a 62-year-old female presents emergency room for worsening arm and shoulder pain.  Patient had been seen previously for an acute event where she had a closed nondisplaced comminuted fracture of the shaft of the right humerus.  She does have a complex past medical history as described below and is chronically on 4 L via nasal cannula and today is reporting ongoing pain and discomfort for pain in the right shoulder following a fall.  Review of the patient's chart shows that in addition to fatigue and a fall and a right humerus fracture along with anemia she had been seen by Dr. Balderrama and and determined to be nonoperative management for humerus fracture.  She deals with some amount of chronic pain and has been feeling oxycodone 10 mg pills on a monthly basis.  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  She reports no interval falls since this event occurred.    PPE Note: I personally donned full PPE for all patient encounters during this visit, including being clean-shaven with an N95 respirator mask, gloves, and goggles.     REVIEW OF SYSTEMS  See HPI for further details. All other systems are negative.     PAST MEDICAL HISTORY   has a past medical history of Anemia, Anesthesia, Breath shortness, Bronchitis ( ), Cardiomegaly, Chronic fatigue and malaise, Chronic obstructive pulmonary disease (HCC), Cirrhosis ([facility]) (December 2011), CKD (chronic kidney disease) stage 3, GFR 30-59 ml/min ([facility]), Diabetes ([facility]), Fracture of left foot, GERD (gastroesophageal reflux disease), H/O Clostridium difficile infection (02-17-17), Hemorrhagic disorder ([facility]), Hiatus hernia syndrome, High cholesterol, Hypothyroid, Jaundice (2009), Liver transplanted ([facility]), Low back pain (02-17-17), Mild aortic  regurgitation and aortic valve sclerosis ( ), On home oxygen therapy, Platelet disorder (HCC), Pneumonia, Psychiatric disorder, Pulmonary hypertension (HCC), S/P cholecystectomy, Small bowel obstruction (HCC), Splenomegaly, and VRE (vancomycin-resistant Enterococci).    SOCIAL HISTORY  Social History     Tobacco Use   • Smoking status: Never Smoker   • Smokeless tobacco: Never Used   • Tobacco comment: avoid all tobacco products   Vaping Use   • Vaping Use: Never used   Substance and Sexual Activity   • Alcohol use: Not Currently     Alcohol/week: 0.0 oz   • Drug use: No   • Sexual activity: Not on file       SURGICAL HISTORY   has a past surgical history that includes anesth,nose,sinus surgery; makayla by laparoscopy (9/19/2009); exam under anesthesia (11/11/2009); hysterectomy, total abdominal; gastroscopy-endo (3/12/2010); bronchoscopy-endo (5/29/2012); bronchoscopy-endo (6/19/2012); sigmoidoscopy flex (9/26/2012); recovery (2/27/2013); bronchoscopy-endo (11/15/2013); recovery (1/21/2014); recovery (3/24/2014); hemorrhoidectomy (11/11/2009); gastroscopy (9/28/2012); carpal tunnel release; bone marrow aspiration (12/31/2012); bone marrow biopsy, ndl/trocar (12/31/2012); recovery (3/31/2014); other abdominal surgery (December 2011); bone marrow aspiration (3/16/2015); bone marrow biopsy, ndl/trocar (3/16/2015); lung biopsy open (11/2016); tonsillectomy; other abdominal surgery (); breast biopsy (Left, 2/21/2017); colonoscopy (N/A, 3/27/2017); gastroscopy (N/A, 3/27/2017); gastric bypass laparoscopic; hernia repair; makayla by laparoscopy; other; other (12/11/2017); other; turbinate reduction (Bilateral, 10/4/2018); nasal reconstruction (Bilateral, 10/4/2018); ventral hernia repair robotic xi (N/A, 11/12/2018); craniotomy (Left, 8/4/2021); and upper gi endoscopy,diagnosis (N/A, 2/3/2022).    CURRENT MEDICATIONS  Home Medications    **Home medications have not yet been reviewed for this encounter**    "      ALLERGIES  Allergies   Allergen Reactions   • Nsaids Unspecified     Can not take due to hx of liver transplant    • Rhubarb Anaphylaxis   • Organic Nitrates Unspecified     Nitroglycerin products should be avoided with the use of PDE-5 inhibitors as the combination can result in severe hypotension.  RD clarified with pt: Nitrates in food are okay and pt does not want nitrates to be listed as food allergy. Pt only avoids medications with nitrates.    • Other Drug      Any medication that may interact with cyclosporine, tacrolimus, sirolimus, or prograf (due to hx of liver transplant)        PHYSICAL EXAM  VITAL SIGNS: /77   Pulse 71   Temp 36.3 °C (97.3 °F) (Temporal)   Resp 18   Ht 1.753 m (5' 9\")   Wt 59 kg (130 lb 1.1 oz)   LMP 01/03/2000   SpO2 98%   BMI 19.21 kg/m²   Pulse ox interpretation: I interpret this pulse ox as normal.  Genl: F sitting in chair comfortably, speaking clearly, appears in mild distress   Head: NC/AT   ENT: Mucous membranes moist, posterior pharynx clear, uvula midline, nares patent bilaterally   Eyes: Normal sclera, pupils equal round reactive to light  Pulmonary: Lungs are clear to auscultation bilaterally  Chest: No TTP  CV:  RRR, no murmur appreciated, pulses 2+ in both upper and lower extremities  Abdomen: soft, NT/ND; no rebound/guarding, no masses palpated, no HSM  : no CVA or suprapubic tenderness   Musculoskeletal: Pain free ROM of the neck. Moving upper and lower extremities and spontaneous in coordinated fashion  Right Upper Extremity  - Skin: Tenderness to palpation along the entirety of the right shoulder girdle, there is no apparent glenohumeral joint space abnormalities, there is no obvious deformities or gross angulations, there is developing and dependent ecchymosis along the distal portion of the humerus and forearm.  She has point tenderness along the dorsum of the wrist extending towards the anatomical snuffbox.  No abrasions, no lacerations  - " Motor: limited ROM at shoulder, slightly decreased elbow ROM due to pain at shoulder, wrist ROM intact; 5/5 wrist flexion/extension, thumb IP joint flexion/extension (AIN/PIN), abduction/adduction (ulnar) intact  - Sensation intact to median/ulnar/radial nerves  - 2+ radial pulse, < 2 sec cap refill x 5 digits  Neuro: A&Ox4 (person, place, time, situation), speech fluent, gait steady, no focal deficits appreciated, No cerebellar signs  Skin: No rash or lesions.  dependant ecchymosis as noted above. No pallor or jaundice.  No cyanosis.  Warm and dry.     DIAGNOSTIC STUDIES / PROCEDURES    LABS  Labs Reviewed - No data to display    RADIOLOGY  DX-WRIST-COMPLETE 3+ RIGHT   Final Result      1.  There is slight irregularity of the radial styloid on the oblique view which may represent a subacute nondisplaced fracture.      DX-SHOULDER 2+ RIGHT   Final Result      Healing comminuted right humerus fracture        COURSE & MEDICAL DECISION MAKING  Pertinent Labs & Imaging studies reviewed. (See chart for details)    DDX:fracture, ligamentous injury, dislocation, subluxation, adhesive capsulitis, neurovascular compromise    MDM    Initial evaluation at 1456:  Patient is seen and evaluated for symptoms as described above.  It is reassuring that since the event has occurred she has not had any repeat falls or traumatic injuries.  She is reporting pain and at this point has not followed up in the orthopedic clinic in 3 weeks time.  She was told by her primary care doctor that a certain amount of time you transpire before swelling could calm down and she continues to take her oxycodone medication with minimal improvement.  She is not having any appreciable weakness, no numbness or tingling and has no muscle tone decreased.    Reviewed x-ray images show that the shoulder has changes associated with ongoing healing healing of a humerus fracture with no obvious subluxation.  She could have continued pain from a adhesive capsulitis  and the other inflammatory changes around the substantial fracture.  She is in a simple sling I would recommend shoulder immobilizer with removal of the sling several times daily for very gentle cervical exercises as tolerated.  Outpatient orthopedic consultation will be placed.  Additionally the wrist x-ray shows a subtle possible nondisplaced distal radial styloid fracture.  She is placed in a volar splint at the wrist in addition to her shoulder immobilizer as described above.    At this time the patient is requiring escalating pain medications and she has been on an escalating dose of pain medications, taking more of her previously prescribed medications due to this new acute and escalating pain.  At this time short course of pain medications will be prescribed.  I do believe the patient should contact her pain medicine prescriber and have her overall medications adjusted to account for this interval increase.  She is counseled on the importance of taking these as directed, the importance of being aware of oversedation and the increased chance of fall because of the escalating amounts of medication she is on.   and her are aware, they feel comfortable at this time and will call they are prescribing physician.    NARCOTICS  In prescribing controlled substances to this patient, I certify that I have obtained and reviewed the medical history of Roxana Cuba. I have also made a good nini effort to obtain applicable records from other providers who have treated the patient and no other records are available at this time.     I have conducted a physical exam and documented it. I have reviewed Ms. Cuba’s prescription history as maintained by the [place] Prescription Monitoring Program.     I have assessed the patient’s risk for abuse, dependency, and addiction using the validated Opioid Risk Tool available at https://www.mdcalc.com/pktqdn-hnaz-mopw-ort-narcotic-abuse.     Given the above, I believe the  benefits of controlled substance therapy outweigh the risks. The reasons for prescribing controlled substances include non-narcotic, oral analgesic alternatives have been inadequate for pain control. Accordingly, I have discussed the risk and benefits, treatment plan, and alternative therapies with the patient.     FINAL IMPRESSION  Visit Diagnoses     ICD-10-CM   1. Chronic right shoulder pain  M25.511    G89.29   2. Right wrist pain  M25.531   3. Closed fracture of proximal end of right humerus, unspecified fracture morphology, initial encounter  S42.201A       Electronically signed by: Lonny Costa M.D., 4/16/2022 2:56 PM

## 2022-04-16 NOTE — ED TRIAGE NOTES
"Pt reports a hx of a \"fractured\" right arm/right shoulder incurred approximately 3 weeks ago.  She was seen in our department initially.  She continues to C/O recurrent pain. She is on chronic O2 supplementation (4 L NC).   Chief Complaint   Patient presents with   • Arm Pain     /70   Pulse 85   Temp 36.1 °C (97 °F) (Temporal)   Resp 20   Ht 1.753 m (5' 9\")   Wt 59 kg (130 lb 1.1 oz)   LMP 01/03/2000   SpO2 98%   BMI 19.21 kg/m²   Has this patient been vaccinated for COVID YES  If not, would they like to be vaccinated while in the ER if eligible?  N/A  Would the patient like to speak with the ERP about the possibility of receiving the COVID vaccine today before making a decision? N/A     "

## 2022-04-16 NOTE — ED NOTES
Shoulder immobilizer and wrist splint education provided, both Pt and spouse verbalized understanding;

## 2022-04-16 NOTE — ED NOTES
Patient is stable for discharge at this time, anticipatory guidance provided, close follow-up is encouraged, and ED return instructions have been detailed. Patient and spouse are both agreeable to the disposition and plan and discharged home in ambulatory state and in good condition.

## 2022-04-25 NOTE — TELEPHONE ENCOUNTER
Received request via: Pharmacy    Was the patient seen in the last year in this department? Yes  3/29/22  Does the patient have an active prescription (recently filled or refills available) for medication(s) requested? No

## 2022-05-01 NOTE — ED TRIAGE NOTES
Pt to er with c/o nausea this am and chronic h/a. Was given emgality by dr hogan on Thursday for same and feels that this is what is causing her nausea. Is also on 4Ln/c for pulm hypertension. Denies vision chgs,

## 2022-05-02 NOTE — DISCHARGE INSTRUCTIONS
See your doctor later this week if no improvement.  Return if worse or for any concerns.  Continue to take your usual medication as prescribed

## 2022-05-02 NOTE — ED PROVIDER NOTES
"ED Provider Note    CHIEF COMPLAINT  Chief Complaint   Patient presents with   • Headache     \"several weeks'   • Nausea     This am, thinks its from med emgality for migraines given on Thursday by dr hogan   • Dizziness     \"atleast a week\"       HPI  Roxana Cuba is a 62 y.o. female who presents with a headache, bifrontal, similar to previous headaches.  She states she was taking a new medication prescribed by her neurologist, emgality, without relief of her headache however developed dizziness and nausea following this.  She is concerned about side effects from the medication.  Patient takes oral morphine twice a day for chronic pain, is requesting an injection of morphine for help with her headache.  Her nausea is persistent however without vomiting.  Headache has been episodic for several weeks she states, has been worked up in the past with imaging she states.  No associated numbness or weakness.  No confusion, no vision change.  According to her  and herself she is acting her usual self without new confusion.  Her  states she has short-term memory problems however this is not new.    REVIEW OF SYSTEMS  Neurologic: Headache, no acute numbness or weakness  Eyes: No vision change  Ear nose throat: No facial pain  Gastrointestinal: Nausea without vomiting  Musculoskeletal: No acute neck pain           PAST MEDICAL HISTORY  Past Medical History:   Diagnosis Date   • Anemia    • Anesthesia     \"takes more to get me under, would rather be intubated\"    • Breath shortness     cont oxygen 5L (Preffered)   • Bronchitis      2016   • Cardiomegaly    • Chronic fatigue and malaise    • Chronic obstructive pulmonary disease (HCC)    • Cirrhosis (HCC) December 2011    Status post [facility]   • CKD (chronic kidney disease) stage 3, GFR 30-59 ml/min (HCC)    • Diabetes (HCC)     reports hx of, resolved w/weight loss. Reports still checking bloodsugars twice daily and using insulin PRN   • " "Fracture of left foot    • GERD (gastroesophageal reflux disease)         • H/O Clostridium difficile infection 02-17-17    reports \"16 months ago\". Current stool sample 01-26-17 neg   • Hemorrhagic disorder (HCC)     \"low platelets\" bruises easily    • Hiatus hernia syndrome    • High cholesterol    • Hypothyroid    • Jaundice 2009   • Liver transplanted (HCC)    • Low back pain 02-17-17    and left foot, 7/10   • Mild aortic regurgitation and aortic valve sclerosis     • On home oxygen therapy     5 liters, Dr. Gaming   • Platelet disorder ([facility])     low platelets   • Pneumonia     aspiration,    • Psychiatric disorder     Mood disorder   • Pulmonary hypertension ([facility])        • S/P cholecystectomy    • Small bowel obstruction ([facility])     01-   • Splenomegaly    • VRE (vancomycin-resistant Enterococci)     02-17-17, pt declines knowledge of this       FAMILY HISTORY  Family History   Problem Relation Age of Onset   • Other Father         Unknown (dead 10 years)   • Diabetes Father    • Heart Disease Father    • Hypertension Father    • Hyperlipidemia Father    • Stroke Father    • Non-contributory Mother    • Hyperlipidemia Mother    • Alcohol/Drug Mother    • Cancer Paternal Aunt    • Alcohol/Drug Maternal Grandmother    • Alcohol/Drug Maternal Grandfather        SOCIAL HISTORY  Social History     Socioeconomic History   • Marital status:    Tobacco Use   • Smoking status: Never Smoker   • Smokeless tobacco: Never Used   • Tobacco comment: avoid all tobacco products   Vaping Use   • Vaping Use: Never used   Substance and Sexual Activity   • Alcohol use: Not Currently     Alcohol/week: 0.0 oz   • Drug use: No       SURGICAL HISTORY  Past Surgical History:   Procedure Laterality Date   • [place] UPPER GI ENDOSCOPY,DIAGNOSIS N/A 2/3/2022    Procedure: GASTROSCOPY;  Surgeon: Aravind Richards M.D.;  Location: SURGERY SAME DAY [facility];  Service: Gastroenterology   • CRANIOTOMY Left 8/4/2021    " Procedure: CRANIOTOMY;  Surgeon: Tramaine Arnold III, M.D.;  Location: SURGERY [facility];  Service: Neurosurgery   • VENTRAL HERNIA REPAIR ROBOTIC XI N/A 11/12/2018    Procedure: VENTRAL HERNIA REPAIR ROBOTIC XI- FOR EPIGASTRIC INCISIONAL;  Surgeon: John H Ganser, M.D.;  Location: SURGERY [facility];  Service: General   • TURBINATE REDUCTION Bilateral 10/4/2018    Procedure: TURBINATE REDUCTION;  Surgeon: Silviano Erazo M.D.;  Location: SURGERY SAME DAY [facility];  Service: Ent   • NASAL RECONSTRUCTION Bilateral 10/4/2018    Procedure: NASAL RECONSTRUCTION- LATERA IMPLANTS;  Surgeon: Silviano Erazo M.D.;  Location: SURGERY SAME DAY [facility];  Service: Ent   • OTHER  12/11/2017    paniculectomy   • COLONOSCOPY N/A 3/27/2017    Procedure: COLONOSCOPY;  Surgeon: Osman Matt M.D.;  Location: SURGERY SAME DAY [facility];  Service:    • GASTROSCOPY N/A 3/27/2017    Procedure: GASTROSCOPY;  Surgeon: Osman Matt M.D.;  Location: SURGERY SAME DAY [facility];  Service:    • BREAST BIOPSY Left 2/21/2017    Procedure: BREAST BIOPSY - WIRE LOCALIZED EXCISONAL ;  Surgeon: Jaen Zhao M.D.;  Location: SURGERY SAME DAY [facility];  Service:    • LUNG BIOPSY OPEN  11/2016   • OTHER ABDOMINAL SURGERY      Gasric Sleeve   • BONE MARROW ASPIRATION  3/16/2015    Performed by Freddie Hi M.D. at ENDOSCOPY [facility]   • BONE MARROW BIOPSY, NDL/TROCAR  3/16/2015    Performed by Freddie Hi M.D. at ENDOSCOPY [facility]   • RECOVERY  3/31/2014    Performed by [facility] at SURGERY [facility]   • RECOVERY  3/24/2014    Performed by [facility] at SURGERY SAME DAY [facility]   • RECOVERY  1/21/2014    Performed by [facility] at SURGERY SAME DAY [facility]   • BRONCHOSCOPY-ENDO  11/15/2013    Performed by Ha Perez M.D. at ENDOSCOPY [facility]   • RECOVERY  2/27/2013    Performed by [facility] at SURGERY SAME DAY [facility]   •  BONE MARROW ASPIRATION  12/31/2012    Performed by Josemanuel Real M.D. at ENDOSCOPY [facility]   • BONE MARROW BIOPSY, NDL/TROCAR  12/31/2012    Performed by Josemanuel Real M.D. at ENDOSCOPY [facility]   • GASTROSCOPY  9/28/2012    Performed by Aravind Richards M.D. at SURGERY [facility]   • SIGMOIDOSCOPY FLEX  9/26/2012    Performed by Kristopher Cardoso M.D. at ENDOSCOPY [facility]   • BRONCHOSCOPY-ENDO  6/19/2012    Performed by MARLENA MURILLO at ENDOSCOPY [facility]   • BRONCHOSCOPY-ENDO  5/29/2012    Performed by SUYAPA CAMARENA at ENDOSCOPY [facility]   • OTHER ABDOMINAL SURGERY  December 2011    Liver transplant at [facility] by Dr. Canada.   • GASTROSCOPY-ENDO  3/12/2010    Performed by CAMELIA SAMANO at ENDOSCOPY [facility]   • EXAM UNDER ANESTHESIA  11/11/2009    Performed by BIRD ABDI at SURGERY [facility]   • HEMORRHOIDECTOMY  11/11/2009    Performed by BIRD ABDI at SURGERY [facility]   • KELBY BY LAPAROSCOPY  9/19/2009    Performed by BIRD ABDI at SURGERY [facility]   • CARPAL TUNNEL RELEASE          • KELBY BY LAPAROSCOPY     • GASTRIC BYPASS LAPAROSCOPIC     • HERNIA REPAIR      x3   • HYSTERECTOMY, TOTAL ABDOMINAL          • OTHER      Breast reduction   • OTHER      liver transplant   • [place] ANESTH,NOSE,SINUS SURGERY      x4   • TONSILLECTOMY         CURRENT MEDICATIONS  No current facility-administered medications on file prior to encounter.     Current Outpatient Medications on File Prior to Encounter   Medication Sig Dispense Refill   • tacrolimus (PROGRAF) 1 MG Cap TAKE 4 CAPSULES BY MOUTH EVERY DAY FOR 30 DAYS 120 Capsule 5   • traZODone (DESYREL) 50 MG Tab Take 50 mg by mouth at bedtime.     • sertraline (ZOLOFT) 100 MG Tab Take 1.5 Tablets by mouth every day. 135 Tablet 3   • fludrocortisone (FLORINEF) 0.1 MG Tab Take 1 Tablet by mouth every day for 90 days. Indications: Blood Pressure Drop Upon Standing 90 Tablet 2   •  tadalafil (CIALIS) 20 MG tablet Take 1 Tablet by mouth every day. 90 Tablet 3   • oxybutynin SR (DITROPAN-XL) 10 MG CR tablet Take 1 Tablet by mouth every day. 30 Tablet 0   • levetiracetam (KEPPRA) 750 MG tablet Take 750 mg by mouth 2 times a day.     • metoclopramide (REGLAN) 10 MG Tab Take 10 mg by mouth 2 times a day.     • CALCIUM-VITAMIN D PO Take 1 Tablet by mouth every evening.     • magnesium oxide (MAG-OX) 400 MG Tab tablet Take 400 mg by mouth every day.     • ondansetron (ZOFRAN ODT) 4 MG TABLET DISPERSIBLE DISSOLVE 1 TAB ON TONGUE EVERY 8 HOURS AS NEEDED FOR NAUSEA 90 Tablet 1   • levothyroxine (SYNTHROID) 137 MCG Tab Take 1 Tablet by mouth every morning on an empty stomach. Indications: Underactive Thyroid 90 Tablet 3   • Galcanezumab-gnlm (EMGALITY) 120 MG/ML Solution Auto-injector Inject 1 PEN under the skin every 4 weeks. 1 Each 5   • amitriptyline (ELAVIL) 25 MG Tab Take 25 mg by mouth every evening.     • Ascorbic Acid (VITAMIN C) 500 MG Chew Tab Chew 500 mg every day. Indications: Inadequate Vitamin C     • potassium chloride SA (KDUR) 20 MEQ Tab CR Take 1 Tablet by mouth every day. 90 Tablet 3   • albuterol (PROAIR HFA) 108 (90 Base) MCG/ACT Aero Soln inhalation aerosol Inhale 2 Puffs every four hours as needed for Shortness of Breath (wheezing). 8.5 g 3   • furosemide (LASIX) 20 MG Tab Take 1 Tablet by mouth every day. Indications: Edema 30 Tablet 0   • ambrisentan (LETAIRIS) 10 MG tablet TAKE 1 TABLET BY MOUTH EVERY DAY 30 Tablet 11   • omeprazole (PRILOSEC) 40 MG delayed-release capsule Take 1 Capsule by mouth every day. 90 Capsule 3   • pravastatin (PRAVACHOL) 20 MG Tab Take 1 Tablet by mouth every day. 90 Tablet 3   • mycophenolate (CELLCEPT) 250 MG Cap Take 250 mg by mouth 2 times a day. Indications: Liver Transplant Recipient     • therapeutic multivitamin-minerals (THERAGRAN-M) Tab Take 1 Tablet by mouth every day. Indications: suppliment         ALLERGIES  Allergies   Allergen Reactions  "  • Nsaids Unspecified     Can not take due to hx of liver transplant    • Rhubarb Anaphylaxis   • Organic Nitrates Unspecified     Nitroglycerin products should be avoided with the use of PDE-5 inhibitors as the combination can result in severe hypotension.  RD clarified with pt: Nitrates in food are okay and pt does not want nitrates to be listed as food allergy. Pt only avoids medications with nitrates.    • Other Drug      Any medication that may interact with cyclosporine, tacrolimus, sirolimus, or prograf (due to hx of liver transplant)        PHYSICAL EXAM  VITAL SIGNS: /74   Pulse 91   Temp 37.1 °C (98.8 °F) (Temporal)   Resp 18   Ht 1.753 m (5' 9\")   LMP 01/03/2000   SpO2 97%   Breastfeeding No   BMI 19.21 kg/m²   Constitutional:  No acute distress, Non-toxic appearance.   HENT: No facial droop.  No epistaxis  Eyes: PERRLA, EOMI, Conjunctiva normal, No discharge.  No nystagmus  Musculoskeletal: No cervical neck tenderness, neck is supple.   Lymphatic: No lymphadenopathy.   Cardiovascular: Normal heart rate, Normal rhythm  Pulmonary: Normal breath sounds, No respiratory distress, No wheezing  Skin: Warm, Dry, No erythema, No rash.    Neurologic: Sensation intact.  Strength intact.  Speech clear.  Psychiatric: Affect normal, Mood normal.       COURSE & MEDICAL DECISION MAKING  Pertinent Labs & Imaging studies reviewed. (See chart for details)  Patient having exacerbation of chronic headaches.  No acute neurologic findings, no evidence of stroke.  She was treated with 5 mg intramuscular morphine, oral Zofran with improvement of the nausea and near resolution of the headache.  She states she feels much better and is requesting discharge home.  I have reviewed this patient's past medical history, it is extensive.  We did discuss that symptoms of dizziness, nausea and headache can be due to other problems better evaluated with imaging and blood work.  At this time she is refusing further testing, " states she feels improved.  She has expressed a willingness to return should symptoms worsen.  She is discharged in the care of her , headache improved.  Her vital signs are normal at this time.    FINAL IMPRESSION     1. Nonintractable episodic headache, unspecified headache type     2. Nausea             Electronically signed by: Silviano Collado M.D., 5/1/2022 5:01 PM     118

## 2022-05-04 NOTE — PROGRESS NOTES
Neurobehavioral Status Exam    Patient name: Roxana Cuba  Referral source: Dl Jimenez M.D.   MRN: [id number]  Evaluation date: 05/04/2022   YOB: 1960  Neuropsychologist: Leighton Chopra, Ph.D.         This evaluation was conducted for clinical treatment planning and may not be valid for other purposes. Potential risks and benefits, limits of confidentiality, and test procedures were discussed. Following this discussion, the patient consented to complete the evaluation. Information for this section was obtained from the medical record and a clinical interview with the patient and her  conducted on 05/04/2022. Information included in this section is intended as a reference and should not be interpreted in the absence of the complete neuropsychological report. Please refer to the neuropsychological report for additional information including test results, impressions, and recommendations.     The patient reported she was very fatigued and could not longer continue with the testing session following the administration of self report questionnaires and a cognitive screening measure. The patient noted she would prefer to come back another day to complete the testing portion of the session. Her  agreed that this would be the best option because her current fatigue would likely interfere with testing performance. Given this concerns, the patient was scheduled to complete testing on 05/12/2022 at 3 PM.    Background and Referral Information: The patient is a 62-year-old, , right-handed, White, female, retired , with 18 years of education, who has experienced cognitive decline in the context of a subdural and subarachnoid hemorrhage (left frontal-temporal) with subsequent craniotomy in August 2021. Dr. Jimenez referred the patient for a neuropsychological evaluation to characterize her cognitive concerns, aid in differential diagnosis, and treatment  planning.    Presenting Concerns: The patient and her  reported she has experienced significant cognitive decline since the subdural and subarachnoid hemorrhage. There were no reports of cognitive problems in the patient before August 2021. Currently, they noted she has difficulties with short-term memory, attention, processing speed, and executive functioning. She receives assistance with all instrumental activities of daily living (ADLs) and remains independent with basic ADLs, though physical problems interfere at times. She reported moderate to severe symptoms of depression and anxiety on self report measures. Upon further questioning during the clinical interview, she reported milder symptoms of depression and anxiety.     Patient Active Problem List   Diagnosis   • Status post liver transplant Dr. Canada ([facility])   • Hypothyroidism   • History of pancreatitis   • H/O non-ST elevation myocardial infarction (NSTEMI)   • DUC (generalized anxiety disorder)   • Primary insomnia   • Sarcoidosis (HCC)   • Interstitial lung disease (HCC)   • MGUS (monoclonal gammopathy of unknown significance)   • Hepatopulmonary syndrome ([facility])   • Ostium secundum type atrial septal defect, S/P percutaneous closure   • Mild aortic regurgitation and aortic valve sclerosis   • Vitamin D deficiency   • Chronic respiratory failure ([facility])   • Pure hypercholesterolemia   • Mild mitral regurgitation   • Splenomegaly   • Immunocompromised state ([facility])   • Splenic lesion   • Chronic pain syndrome   • Other allergic rhinitis   • GERD (gastroesophageal reflux disease)   • Hypokalemia   • Chronic prescription benzodiazepine use   • Recurrent major depressive disorder, in remission ([facility])   • History of small bowel obstruction   • History of Clostridium difficile infection   • Steroid-induced hyperglycemia   • S/P bariatric surgery   • Slow transit constipation   • Thrombocytopenia ([facility])   • Chronic nausea   • History of aspiration  "pneumonia   • Hiatal hernia   • High risk medication use   • Mixed obsessional thoughts and acts   • Primary pulmonary hypertension (HCC)   • Hyperglycemia   • Incisional hernia, without obstruction or gangrene   • History of infection with vancomycin resistant Enterococcus (VRE)   • Adrenal insufficiency (HCC)   • Abdominal pain   • Pancytopenia (HCC)   • Cold sore   • SDH (subdural hematoma) (HCC)   • Hypernatremia   • History of pneumonia   • Seizures (HCC)   • Closed fracture of right elbow with routine healing   • Hypomagnesemia   • Hypocalcemia   • Campylobacter gastroenteritis   • Debility   • Dizziness   • Volume overload   • Cellulitis of right lower extremity   • Moderate protein-calorie malnutrition (HCC)   • Acute colitis   • Chronic migraine without aura, intractable, without status migrainosus   • Anemia requiring a blood transfusion   • Lactic acidosis   • Closed fracture of proximal end of humerus   • Syncope     Past Medical History:   Diagnosis Date   • Anemia    • Anesthesia     \"takes more to get me under, would rather be intubated\"    • Breath shortness     cont oxygen 5L (Preffered)   • Bronchitis      2016   • Cardiomegaly    • Chronic fatigue and malaise    • Chronic obstructive pulmonary disease (HCC)    • Cirrhosis (HCC) December 2011    Status post liver transplant at [facility]   • CKD (chronic kidney disease) stage 3, GFR 30-59 ml/min (HCC)    • Diabetes ([facility])     reports hx of, resolved w/weight loss. Reports still checking bloodsugars twice daily and using insulin PRN   • Fracture of left foot    • GERD (gastroesophageal reflux disease)         • H/O Clostridium difficile infection 02-17-17    reports \"16 months ago\". Current stool sample 01-26-17 neg   • Hemorrhagic disorder (HCC)     \"low platelets\" bruises easily    • Hiatus hernia syndrome    • High cholesterol    • Hypothyroid    • Jaundice 2009   • Liver transplanted (HCC)    • Low back pain 02-17-17    and left foot, 7/10   • Mild " aortic regurgitation and aortic valve sclerosis     • On home oxygen therapy     5 liters, Dr. Gaming   • Platelet disorder (HCC)     low platelets   • Pneumonia     aspiration,    • Psychiatric disorder     Mood disorder   • Pulmonary hypertension (HCC)        • S/P cholecystectomy    • Small bowel obstruction (HCC)     01-   • Splenomegaly    • VRE (vancomycin-resistant Enterococci)     02-17-17, pt declines knowledge of this      Past Surgical History:   Procedure Laterality Date   • [place] UPPER GI ENDOSCOPY,DIAGNOSIS N/A 2/3/2022    Procedure: GASTROSCOPY;  Surgeon: Aravind Richards M.D.;  Location: SURGERY SAME DAY [facility];  Service: Gastroenterology   • CRANIOTOMY Left 8/4/2021    Procedure: CRANIOTOMY;  Surgeon: Tramaine Arnold III, M.D.;  Location: SURGERY [facility];  Service: Neurosurgery   • VENTRAL HERNIA REPAIR ROBOTIC XI N/A 11/12/2018    Procedure: VENTRAL HERNIA REPAIR ROBOTIC XI- FOR EPIGASTRIC INCISIONAL;  Surgeon: John H Ganser, M.D.;  Location: SURGERY [facility];  Service: General   • TURBINATE REDUCTION Bilateral 10/4/2018    Procedure: TURBINATE REDUCTION;  Surgeon: Silviano Erazo M.D.;  Location: SURGERY SAME DAY [facility]S;  Service: Ent   • NASAL RECONSTRUCTION Bilateral 10/4/2018    Procedure: NASAL RECONSTRUCTION- LATERA IMPLANTS;  Surgeon: Silviano Erazo M.D.;  Location: SURGERY SAME DAY [facility] ORS;  Service: Ent   • OTHER  12/11/2017    paniculectomy   • COLONOSCOPY N/A 3/27/2017    Procedure: COLONOSCOPY;  Surgeon: Osman Matt M.D.;  Location: SURGERY SAME DAY [facility];  Service:    • GASTROSCOPY N/A 3/27/2017    Procedure: GASTROSCOPY;  Surgeon: Osman Matt M.D.;  Location: SURGERY SAME DAY [facility];  Service:    • BREAST BIOPSY Left 2/21/2017    Procedure: BREAST BIOPSY - WIRE LOCALIZED EXCISONAL ;  Surgeon: Jane Zhao M.D.;  Location: SURGERY SAME DAY [facility];  Service:    • LUNG BIOPSY OPEN  11/2016   • OTHER ABDOMINAL SURGERY       Gasric Sleeve   • BONE MARROW ASPIRATION  3/16/2015    Performed by Marlena Hi M.D. at ENDOSCOPY [facility]   • BONE MARROW BIOPSY, NDL/TROCAR  3/16/2015    Performed by Marlena Hi M.D. at ENDOSCOPY [facility]   • RECOVERY  3/31/2014    Performed by Ir[facility]Recovery Surgery at SURGERY [facility]   • RECOVERY  3/24/2014    Performed by Cath-Recovery Surgery at SURGERY SAME DAY [facility]   • RECOVERY  1/21/2014    Performed by [facility]Recovery Surgery at SURGERY SAME DAY [facility]   • BRONCHOSCOPY-ENDO  11/15/2013    Performed by Ha Perez M.D. at ENDOSCOPY [facility]   • RECOVERY  2/27/2013    Performed by Ir[facility]Recovery Surgery at SURGERY SAME DAY [facility]   • BONE MARROW ASPIRATION  12/31/2012    Performed by Josemanuel Real M.D. at ENDOSCOPY [facility]   • BONE MARROW BIOPSY, NDL/TROCAR  12/31/2012    Performed by Josemanuel Real M.D. at ENDOSCOPY [facility]   • GASTROSCOPY  9/28/2012    Performed by Aravind Richards M.D. at SURGERY [facility]   • SIGMOIDOSCOPY FLEX  9/26/2012    Performed by Kristopher Cardoso M.D. at ENDOSCOPY [facility]   • BRONCHOSCOPY-ENDO  6/19/2012    Performed by MARLENA MURILLO at ENDOSCOPY [facility]   • BRONCHOSCOPY-ENDO  5/29/2012    Performed by SUYAPA CAMARENA at ENDOSCOPY [facility]   • OTHER ABDOMINAL SURGERY  December 2011    [place] [facility] [place].   • GASTROSCOPY-ENDO  3/12/2010    Performed by CAMELIA SAMANO at ENDOSCOPY [facility]   • EXAM UNDER ANESTHESIA  11/11/2009    Performed by BIRD ABDI at SURGERY [facility]   • HEMORRHOIDECTOMY  11/11/2009    Performed by BIRD ABDI at SURGERY [facility]   • KELBY BY LAPAROSCOPY  9/19/2009    Performed by BIRD ABDI at SURGERY [facility]   • CARPAL TUNNEL RELEASE          • KELBY BY LAPAROSCOPY     • GASTRIC BYPASS LAPAROSCOPIC     • HERNIA REPAIR      x3   • HYSTERECTOMY, TOTAL ABDOMINAL           • OTHER      Breast reduction   • OTHER      liver transplant   • ID ANESTH,NOSE,SINUS SURGERY      x4   • TONSILLECTOMY        Family History   Problem Relation Age of Onset   • Other Father         Unknown (dead 10 years)   • Diabetes Father    • Heart Disease Father    • Hypertension Father    • Hyperlipidemia Father    • Stroke Father    • Non-contributory Mother    • Hyperlipidemia Mother    • Alcohol/Drug Mother    • Cancer Paternal Aunt    • Alcohol/Drug Maternal Grandmother    • Alcohol/Drug Maternal Grandfather         Current Outpatient Medications:   •  tacrolimus (PROGRAF) 1 MG Cap, TAKE 4 CAPSULES BY MOUTH EVERY DAY FOR 30 DAYS, Disp: 120 Capsule, Rfl: 5  •  traZODone (DESYREL) 50 MG Tab, Take 50 mg by mouth at bedtime., Disp: , Rfl:   •  sertraline (ZOLOFT) 100 MG Tab, Take 1.5 Tablets by mouth every day., Disp: 135 Tablet, Rfl: 3  •  fludrocortisone (FLORINEF) 0.1 MG Tab, Take 1 Tablet by mouth every day for 90 days. Indications: Blood Pressure Drop Upon Standing, Disp: 90 Tablet, Rfl: 2  •  tadalafil (CIALIS) 20 MG tablet, Take 1 Tablet by mouth every day., Disp: 90 Tablet, Rfl: 3  •  oxybutynin SR (DITROPAN-XL) 10 MG CR tablet, Take 1 Tablet by mouth every day., Disp: 30 Tablet, Rfl: 0  •  levetiracetam (KEPPRA) 750 MG tablet, Take 750 mg by mouth 2 times a day., Disp: , Rfl:   •  metoclopramide (REGLAN) 10 MG Tab, Take 10 mg by mouth 2 times a day., Disp: , Rfl:   •  CALCIUM-VITAMIN D PO, Take 1 Tablet by mouth every evening., Disp: , Rfl:   •  magnesium oxide (MAG-OX) 400 MG Tab tablet, Take 400 mg by mouth every day., Disp: , Rfl:   •  ondansetron (ZOFRAN ODT) 4 MG TABLET DISPERSIBLE, DISSOLVE 1 TAB ON TONGUE EVERY 8 HOURS AS NEEDED FOR NAUSEA, Disp: 90 Tablet, Rfl: 1  •  levothyroxine (SYNTHROID) 137 MCG Tab, Take 1 Tablet by mouth every morning on an empty stomach. Indications: Underactive Thyroid, Disp: 90 Tablet, Rfl: 3  •  Galcanezumab-gnlm (EMGALITY) 120 MG/ML Solution Auto-injector,  Inject 1 PEN under the skin every 4 weeks., Disp: 1 Each, Rfl: 5  •  amitriptyline (ELAVIL) 25 MG Tab, Take 25 mg by mouth every evening., Disp: , Rfl:   •  Ascorbic Acid (VITAMIN C) 500 MG Chew Tab, Chew 500 mg every day. Indications: Inadequate Vitamin C, Disp: , Rfl:   •  potassium chloride SA (KDUR) 20 MEQ Tab CR, Take 1 Tablet by mouth every day., Disp: 90 Tablet, Rfl: 3  •  albuterol (PROAIR HFA) 108 (90 Base) MCG/ACT Aero Soln inhalation aerosol, Inhale 2 Puffs every four hours as needed for Shortness of Breath (wheezing)., Disp: 8.5 g, Rfl: 3  •  furosemide (LASIX) 20 MG Tab, Take 1 Tablet by mouth every day. Indications: Edema, Disp: 30 Tablet, Rfl: 0  •  ambrisentan (LETAIRIS) 10 MG tablet, TAKE 1 TABLET BY MOUTH EVERY DAY, Disp: 30 Tablet, Rfl: 11  •  omeprazole (PRILOSEC) 40 MG delayed-release capsule, Take 1 Capsule by mouth every day., Disp: 90 Capsule, Rfl: 3  •  pravastatin (PRAVACHOL) 20 MG Tab, Take 1 Tablet by mouth every day., Disp: 90 Tablet, Rfl: 3  •  mycophenolate (CELLCEPT) 250 MG Cap, Take 250 mg by mouth 2 times a day. Indications: Liver Transplant Recipient, Disp: , Rfl:   •  therapeutic multivitamin-minerals (THERAGRAN-M) Tab, Take 1 Tablet by mouth every day. Indications: suppliment, Disp: , Rfl:       Social History     Tobacco Use   • Smoking status: Never Smoker   • Smokeless tobacco: Never Used   • Tobacco comment: avoid all tobacco products   Vaping Use   • Vaping Use: Never used   Substance and Sexual Activity   • Alcohol use: Not Currently     Alcohol/week: 0.0 oz   • Drug use: No   • Sexual activity: Not on file      Measures Administered: Generalized Anxiety Disorder 7 Item Scale (DUC-7); Mini-Mental State Examination - 2nd Ed. (MMSE-2); Patient Health Questionnaire (PHQ-9). Informant Questionnaires: Activities of Daily Living Questionnaire (ADLQ); Frontal Systems Behavior Scale (FrSBe; Family-Rating Form); and Neuropsychiatric Inventory Questionnaire (NPI-Q).    Behavioral  Observations: The patient arrived at the clinic on time and was accompanied by her , who participated in the clinical interview. Everybody wore facemasks given the COVID-19 pandemic. The patient used portable oxygen throughout the appointment. She was well-groomed and dressed. She was somnolent. She was oriented to situation, person, and place, but incompletely oriented to time (MMSE-2 Orientation = 8/10; incorrect day of the week and date). She was polite and always maintained appropriate interpersonal boundaries. Rapport was easily established. Gait was slow and mildly unsteady. Her  aided her with ambulation. She reported significant pain in her right arm, which limited hand mobility and interfered with writing and drawing during the cognitive screening measure.  She exhibited frequent word finding pauses and lost her train of thought at times during the interview. Comprehension was adequate for conversation and test instructions. Speech rate, volume, articulation, and prosody were intact. Speech content was appropriate to context. Mood was reportedly depressed. Affect was mood-congruent and appropriate to the situation. Insight into cognitive and emotional functioning was intact. There was no indication of hallucinations, delusions, or thought disorder. Vision and hearing were adequate for the evaluation. As noted above, the patient reported severe fatigue and requested to discontinue the testing session.     Leighton Chopra, Ph.D.          Clinical Neuropsychologist          Department of Neurology          [facility]           Two hours and 15 minutes were spent interviewing the patient and her  (neurobehavioral status exam: 21395 = 1; 58281 = 1).

## 2022-05-12 NOTE — TELEPHONE ENCOUNTER
Received request via: Pharmacy    Was the patient seen in the last year in this department? Yes  3/29/2022  Does the patient have an active prescription (recently filled or refills available) for medication(s) requested? No

## 2022-05-12 NOTE — Clinical Note
We have reached out to Ms. Cuba multiple times to schedule a feedback session to go over the results of her evaluation but we have not been able to reach her. We are hoping to schedule her for next week. For billing purposes I need to wait to complete the feedback session to post the full neuropsychological report, but I posted a summary with the main findings and recommendations for you to review in the mean time. Please let me know if you have any questions.   Andrea, Leighton

## 2022-05-12 NOTE — Clinical Note
Good Morning Dr. Levine,  We have reached out to Ms. Cuba multiple times to schedule a feedback session to go over the results of her evaluation but we have not been able to reach her. We are hoping to schedule her for next week. For billing purposes I need to wait to complete the feedback session to post the full neuropsychological report, but I posted a summary with the main findings and recommendations for you to review in the mean time. Please let me know if you have any questions.  Andrea, Leighton Chopra, PhD

## 2022-05-12 NOTE — PROGRESS NOTES
Neuropsychological Testing    Measures Administered: [employer] Naming Test (BNT); Concepcion-Lugo Executive Functioning System (DKEFS): Color-Word Interference (CWI) & Verbal Fluency (VF); Executive Clock Drawing Test (CLOX); Generalized Anxiety Disorder 7 Item Scale (DUC-7); Ocampo Verbal Learning Test - Revised (HVLT-R); Mini-Mental State Examination - 2nd Ed. (MMSE-2); Neuropsychological Assessment Battery (NAB) Shape Learning (SL); Oral Trail Making Test A & B (O-TMT);  Patient Health Questionnaire (PHQ-9); Repeatable Battery for the Assessment of Neuropsychological Status Line Orientation (RBANS LO); Symbol Digit Modalities Test (SDMT); Test of Premorbid Functioning (ToPF); Trail Making Test A & B (TMT); Wechsler Adult Intelligence Scale - 4th Ed. (WAIS-IV): Digit Span (DS), Matrix Reasoning (MR), Visual Puzzles (); Similarities (SI), Vocabulary (VC), & Information (IN), and Wechsler Memory Scale - 4th Ed. Logical Memory (WMS-IV LM). Informant Questionnaires: Activities of Daily Living Questionnaire (ADLQ); Frontal Systems Behavior Scale (FrSBe; Family Rating Form); and Neuropsychiatric Inventory Questionnaire (NPI-Q).    Behavioral Observations: The patient arrived at the clinic on time and was accompanied by her . Everybody wore facemasks given the COVID-19 pandemic. She used portable oxygen throughout the appointment. She was attentive throughout the evaluation and had no difficulties with retention of task demands. She perceived her performance as poor and made negative remarks, but responded well to encouragement and prompts from the examiner. There was no evidence of overt fatigue, frustration, impulsivity, or disinhibition. Overall, she was engaged and cooperative throughout testing.      Leighton Chopra, Ph.D.          Clinical Neuropsychologist          Department of Neurology          [facility]           Test administration and scoring were completed in 3 hours and 39 minutes (12840 = 1, 99865 =  6).

## 2022-05-19 NOTE — TELEPHONE ENCOUNTER
Received request via: Pharmacy    Was the patient seen in the last year in this department? Yes  LOV 03/29/2022  Does the patient have an active prescription (recently filled or refills available) for medication(s) requested? No     Requesting 90 day supply.

## 2022-05-29 PROBLEM — G93.40 ACUTE ENCEPHALOPATHY: Status: ACTIVE | Noted: 2022-01-01

## 2022-05-29 PROBLEM — R56.9 SEIZURE (HCC): Status: ACTIVE | Noted: 2022-01-01

## 2022-05-29 PROBLEM — R19.7 DIARRHEA: Status: ACTIVE | Noted: 2022-01-01

## 2022-05-29 NOTE — ED NOTES
Covid culture collected and sent to lab.  Urine culture collected and sent to lab.  Pt repositioned on gurney for comfort.  Pt continues to dry heave, no emesis noted at this time.  Pt and visitor updated on POC including pending tests and chart review by ERP.

## 2022-05-29 NOTE — ED NOTES
Patient's  alerted staff to respond to patient. Upon entering room, pt having full tonic clonic seizure and unresponsive to sternal rub. Skin color became dusky and O2 sat 47% on 4L. MD responded to room immediately. Pt postictal and appears confused.  Approx 10 min post seizure activity, pt not verbally responsive but has intentional movent to hold husbands hand.

## 2022-05-29 NOTE — ED TRIAGE NOTES
Pt presents with  from home for abd pain and emesis at 0430. Sudden onset. Pain over umbilical. Appendix intact. Hx of bariatric surgery and liver transplant. Unknown fever. Pt A&O&x4 and in wheelchair.

## 2022-05-29 NOTE — ED PROVIDER NOTES
"ED Provider Note    CHIEF COMPLAINT  Chief Complaint   Patient presents with   • Abdominal Pain   • Vomiting       HPI  Roxana Cuba is a 62 y.o. female who presents to the emergency department for evaluation of abdominal pain and vomiting.  Patient developed abdominal pain this morning for improved.  He was fairly abrupt.  Pain is diffuse but mostly on the left side.  Associated nausea, vomiting, and diarrhea.  No bloody emesis or diarrhea.  Patient denies any urinary complaints.  Pain is intermittent and colicky and mostly on the left side.  No fevers or chills.  No chest pain cough or shortness of breath.  Denies any specific aggravating or alleviating factors or associated complaints.    Reports being compliant with her medications.  Denies any other acute concerns or complaints.    REVIEW OF SYSTEMS  See HPI for further details. All other systems are negative.    PAST MEDICAL HISTORY  Past Medical History:   Diagnosis Date   • Anemia    • Anesthesia     \"takes more to get me under, would rather be intubated\"    • Breath shortness     cont oxygen 5L (Preffered)   • Bronchitis      2016   • Cardiomegaly    • Chronic fatigue and malaise    • Chronic obstructive pulmonary disease (HCC)    • Cirrhosis (HCC) December 2011    Status post liver transplant at [facility]   • CKD (chronic kidney disease) stage 3, GFR 30-59 ml/min (HCC)    • Diabetes (HCC)     reports hx of, resolved w/weight loss. Reports still checking bloodsugars twice daily and using insulin PRN   • Fracture of left foot    • GERD (gastroesophageal reflux disease)         • H/O Clostridium difficile infection 02-17-17    reports \"16 months ago\". Current stool sample 01-26-17 neg   • Hemorrhagic disorder (HCC)     \"low platelets\" bruises easily    • Hiatus hernia syndrome    • High cholesterol    • Hypothyroid    • Jaundice 2009   • Liver transplanted (HCC)    • Low back pain 02-17-17    and left foot, 7/10   • Mild aortic regurgitation and aortic valve " sclerosis     • On home oxygen therapy     5 liters, Dr. Gaming   • Platelet disorder (HCC)     low platelets   • Pneumonia     aspiration,    • Psychiatric disorder     Mood disorder   • Pulmonary hypertension (HCC)        • S/P cholecystectomy    • Small bowel obstruction (HCC)     01-   • Splenomegaly    • VRE (vancomycin-resistant Enterococci)     02-17-17, pt declines knowledge of this       FAMILY HISTORY  Family History   Problem Relation Age of Onset   • Other Father         Unknown (dead 10 years)   • Diabetes Father    • Heart Disease Father    • Hypertension Father    • Hyperlipidemia Father    • Stroke Father    • Non-contributory Mother    • Hyperlipidemia Mother    • Alcohol/Drug Mother    • Cancer Paternal Aunt    • Alcohol/Drug Maternal Grandmother    • Alcohol/Drug Maternal Grandfather        SOCIAL HISTORY  Social History     Socioeconomic History   • Marital status:    Tobacco Use   • Smoking status: Never Smoker   • Smokeless tobacco: Never Used   • Tobacco comment: avoid all tobacco products   Vaping Use   • Vaping Use: Never used   Substance and Sexual Activity   • Alcohol use: Not Currently     Alcohol/week: 0.0 oz   • Drug use: No       SURGICAL HISTORY  Past Surgical History:   Procedure Laterality Date   • NH UPPER GI ENDOSCOPY,DIAGNOSIS N/A 2/3/2022    Procedure: GASTROSCOPY;  Surgeon: Aravind Richards M.D.;  Location: SURGERY SAME DAY [facility];  Service: Gastroenterology   • CRANIOTOMY Left 8/4/2021    Procedure: CRANIOTOMY;  Surgeon: Tramaine Arnold III, M.D.;  Location: [facility];  Service: Neurosurgery   • VENTRAL HERNIA REPAIR ROBOTIC XI N/A 11/12/2018    Procedure: VENTRAL HERNIA REPAIR ROBOTIC XI- FOR EPIGASTRIC INCISIONAL;  Surgeon: John H Ganser, M.D.;  Location: [facility];  Service: General   • TURBINATE REDUCTION Bilateral 10/4/2018    Procedure: TURBINATE REDUCTION;  Surgeon: Silviano Erazo M.D.;  Location: SURGERY SAME DAY  [facility];  Service: Ent   • NASAL RECONSTRUCTION Bilateral 10/4/2018    Procedure: NASAL RECONSTRUCTION- LATERA IMPLANTS;  Surgeon: Silviano Erazo M.D.;  Location: SURGERY SAME DAY [facility];  Service: Ent   • OTHER  12/11/2017    paniculectomy   • COLONOSCOPY N/A 3/27/2017    Procedure: COLONOSCOPY;  Surgeon: Osman Matt M.D.;  Location: SURGERY SAME DAY [facility];  Service:    • GASTROSCOPY N/A 3/27/2017    Procedure: GASTROSCOPY;  Surgeon: Osman Matt M.D.;  Location: SURGERY SAME DAY [facility];  Service:    • BREAST BIOPSY Left 2/21/2017    Procedure: BREAST BIOPSY - WIRE LOCALIZED EXCISONAL ;  Surgeon: Jane Zhao M.D.;  Location: SURGERY SAME DAY [facility];  Service:    • LUNG BIOPSY OPEN  11/2016   • OTHER ABDOMINAL SURGERY      Gasric Sleeve   • BONE MARROW ASPIRATION  3/16/2015    Performed by Freddie Hi M.D. at ENDOSCOPY [facility]   • BONE MARROW BIOPSY, NDL/TROCAR  3/16/2015    Performed by Freddie Hi M.D. at ENDOSCOPY [facility]   • RECOVERY  3/31/2014    Performed by [facility] at SURGERY [facility]   • RECOVERY  3/24/2014    Performed by [facility]Recovery Surgery at SURGERY SAME DAY [facility]   • RECOVERY  1/21/2014    Performed by [facility] at SURGERY SAME DAY [facility]   • BRONCHOSCOPY-ENDO  11/15/2013    Performed by Ha Perez M.D. at ENDOSCOPY [facility]   • RECOVERY  2/27/2013    Performed by [facility] at SURGERY SAME DAY [facility]   • BONE MARROW ASPIRATION  12/31/2012    Performed by Josemanuel Real M.D. at ENDOSCOPY [facility]   • BONE MARROW BIOPSY, NDL/TROCAR  12/31/2012    Performed by Josemanuel Real M.D. at ENDOSCOPY [facility]   • GASTROSCOPY  9/28/2012    Performed by Aravind Richards M.D. at SURGERY [facility]   • SIGMOIDOSCOPY FLEX  9/26/2012    Performed by Kristopher Cardoso M.D. at ENDOSCOPY [facility]   • BRONCHOSCOPY-ENDO  6/19/2012    Performed by  MARLENA MURILLO at ENDOSCOPY [facility]   • BRONCHOSCOPY-ENDO  5/29/2012    Performed by SUYAPA CAMARENA at ENDOSCOPY [facility]   • OTHER ABDOMINAL SURGERY  December 2011    Liver transplant at [facility] by Dr. Canada.   • GASTROSCOPY-ENDO  3/12/2010    Performed by CAMELIA SAMANO at ENDOSCOPY [facility]   • EXAM UNDER ANESTHESIA  11/11/2009    Performed by BIRD ABDI at SURGERY [facility]   • HEMORRHOIDECTOMY  11/11/2009    Performed by BIRD ABDI at SURGERY [facility]   • KELBY BY LAPAROSCOPY  9/19/2009    Performed by BIRD ABDI at SURGERY [facility]   • CARPAL TUNNEL RELEASE          • KELBY BY LAPAROSCOPY     • GASTRIC BYPASS LAPAROSCOPIC     • HERNIA REPAIR      x3   • HYSTERECTOMY, TOTAL ABDOMINAL          • OTHER      Breast reduction   • OTHER      liver transplant   • CA ANESTH,NOSE,SINUS SURGERY      x4   • TONSILLECTOMY         CURRENT MEDICATIONS  Home Medications     Reviewed by Colleen De Jesus (Pharmacy Tech) on 05/29/22 at 1458  Med List Status: Complete   Medication Last Dose Status   albuterol (PROAIR HFA) 108 (90 Base) MCG/ACT Aero Soln inhalation aerosol unlnown Active   ambrisentan (LETAIRIS) 10 MG tablet 5/28/2022 Active   Ascorbic Acid (VITAMIN C) 500 MG Chew Tab 5/28/2022 Active   CALCIUM-VITAMIN D PO 5/28/2022 Active   clonazePAM (KLONOPIN) 0.5 MG Tab 5/28/2022 Active   fludrocortisone (FLORINEF) 0.1 MG Tab 5/28/2022 Active   furosemide (LASIX) 20 MG Tab 5/28/2022 Active   Galcanezumab-gnlm (EMGALITY) 120 MG/ML Solution Auto-injector 5/27/2022 Active   lacosamide (VIMPAT) 100 MG Tab tablet 5/28/2022 Active   levetiracetam (KEPPRA) 750 MG tablet 5/28/2022 Active   levothyroxine (SYNTHROID) 137 MCG Tab 5/28/2022 Active   magnesium oxide (MAG-OX) 400 MG Tab tablet 5/28/2022 Active   metoclopramide (REGLAN) 10 MG Tab 5/28/2022 Active   morphine ER (MS CONTIN) 15 MG Tab CR tablet 5/28/2022 Active   mycophenolate (CELLCEPT) 250 MG Cap  "5/28/2022 Active   omeprazole (PRILOSEC) 40 MG delayed-release capsule 5/28/2022 Active   ondansetron (ZOFRAN ODT) 4 MG TABLET DISPERSIBLE 5/28/2022 Active   oxybutynin SR (DITROPAN-XL) 10 MG CR tablet 5/28/2022 Active   oxyCODONE immediate release (ROXICODONE) 10 MG immediate release tablet 5/28/2022 Active   pravastatin (PRAVACHOL) 20 MG Tab 5/28/2022 Active   sertraline (ZOLOFT) 100 MG Tab 5/28/2022 Active   tacrolimus (PROGRAF) 1 MG Cap 5/28/2022 Active   tadalafil (CIALIS) 20 MG tablet 5/28/2022 Active   therapeutic multivitamin-minerals (THERAGRAN-M) Tab 5/28/2022 Active                ALLERGIES  Allergies   Allergen Reactions   • Nsaids Unspecified     Can not take due to hx of liver transplant    • Rhubarb Anaphylaxis   • Organic Nitrates Unspecified     Nitroglycerin products should be avoided with the use of PDE-5 inhibitors as the combination can result in severe hypotension.  RD clarified with pt: Nitrates in food are okay and pt does not want nitrates to be listed as food allergy. Pt only avoids medications with nitrates.    • Other Drug      Any medication that may interact with cyclosporine, tacrolimus, sirolimus, or prograf (due to hx of liver transplant)        PHYSICAL EXAM  VITAL SIGNS: BP (!) 165/74   Pulse 77   Temp 36.8 °C (98.3 °F)   Resp 15   Ht 1.753 m (5' 9\")   Wt 55.8 kg (123 lb)   LMP 01/03/2000   SpO2 100%   BMI 18.16 kg/m²      Constitutional: Awake alert nontoxic, chronically ill-appearing mild distress.  HENT: Normocephalic, Atraumatic, Bilateral external ears normal, Oropharynx dry  Eyes: PERRL, EOMI, Conjunctiva normal, No discharge.   Neck: Normal range of motion,  Cardiovascular: Normal heart rate, Normal rhythm, No murmurs, No rubs, No gallops.   Thorax & Lungs: Normal breath sounds, No respiratory distress, No wheezing,  Abdomen: Bowel sounds normal, Soft, left periumbilical left lower quadrant tenderness without peritonitis.  Rectal exam performed with RN as a chaperone " shows brown guaiac-negative stool.  Skin: Warm, Dry, No erythema, No rash.   Back: No tenderness, No CVA tenderness. .   Musculoskeletal: Good range of motion in all major joints.  Mild symmetric edema.  Neurologic: Awake, alert, somewhat slow to respond.  Moves all extremities no focal deficits noted.   Psychiatric: Affect flat    Results for orders placed or performed during the hospital encounter of 05/29/22   CBC WITH DIFFERENTIAL   Result Value Ref Range    WBC 4.4 (L) 4.8 - 10.8 K/uL    RBC 4.79 4.20 - 5.40 M/uL    Hemoglobin 14.2 12.0 - 16.0 g/dL    Hematocrit 43.4 37.0 - 47.0 %    MCV 90.6 81.4 - 97.8 fL    MCH 29.6 27.0 - 33.0 pg    MCHC 32.7 (L) 33.6 - 35.0 g/dL    RDW 50.8 (H) 35.9 - 50.0 fL    Platelet Count 119 (L) 164 - 446 K/uL    MPV 8.4 (L) 9.0 - 12.9 fL    Neutrophils-Polys 78.70 (H) 44.00 - 72.00 %    Lymphocytes 17.60 (L) 22.00 - 41.00 %    Monocytes 2.50 0.00 - 13.40 %    Eosinophils 0.00 0.00 - 6.90 %    Basophils 0.70 0.00 - 1.80 %    Immature Granulocytes 0.50 0.00 - 0.90 %    Nucleated RBC 0.00 /100 WBC    Neutrophils (Absolute) 3.44 2.00 - 7.15 K/uL    Lymphs (Absolute) 0.77 (L) 1.00 - 4.80 K/uL    Monos (Absolute) 0.11 0.00 - 0.85 K/uL    Eos (Absolute) 0.00 0.00 - 0.51 K/uL    Baso (Absolute) 0.03 0.00 - 0.12 K/uL    Immature Granulocytes (abs) 0.02 0.00 - 0.11 K/uL    NRBC (Absolute) 0.00 K/uL   Comp Metabolic Panel   Result Value Ref Range    Sodium 141 135 - 145 mmol/L    Potassium 3.7 3.6 - 5.5 mmol/L    Chloride 93 (L) 96 - 112 mmol/L    Co2 24 20 - 33 mmol/L    Anion Gap 24.0 (H) 7.0 - 16.0    Glucose 206 (H) 65 - 99 mg/dL    Bun 10 8 - 22 mg/dL    Creatinine 0.60 0.50 - 1.40 mg/dL    Calcium 9.3 8.4 - 10.2 mg/dL    AST(SGOT) 41 12 - 45 U/L    ALT(SGPT) 27 2 - 50 U/L    Alkaline Phosphatase 101 (H) 30 - 99 U/L    Total Bilirubin 0.4 0.1 - 1.5 mg/dL    Albumin 4.3 3.2 - 4.9 g/dL    Total Protein 6.6 6.0 - 8.2 g/dL    Globulin 2.3 1.9 - 3.5 g/dL    A-G Ratio 1.9 g/dL   LIPASE   Result  Value Ref Range    Lipase 15 7 - 58 U/L   URINALYSIS    Specimen: Urine   Result Value Ref Range    Color Yellow     Character Clear     Specific Gravity 1.025 <1.035    Ph 7.5 5.0 - 8.0    Glucose 250 (A) Negative mg/dL    Ketones Trace (A) Negative mg/dL    Protein 100 (A) Negative mg/dL    Bilirubin Negative Negative    Nitrite Negative Negative    Leukocyte Esterase Negative Negative    Occult Blood Small (A) Negative    Micro Urine Req Microscopic    LACTIC ACID   Result Value Ref Range    Lactic Acid 7.0 (HH) 0.5 - 2.0 mmol/L   ESTIMATED GFR   Result Value Ref Range    GFR (CKD-EPI) 101 >60 mL/min/1.73 m 2   CoV-2, FLU A/B, and RSV by PCR (2-4 Hours CEPHEID) : Collect NP swab in VTM    Specimen: Respirate   Result Value Ref Range    Influenza virus A RNA Negative Negative    Influenza virus B, PCR Negative Negative    RSV, PCR Negative Negative    SARS-CoV-2 by PCR NotDetected     SARS-CoV-2 Source Nasal Swab    URINE MICROSCOPIC (W/UA)   Result Value Ref Range    WBC 0-2 /hpf    RBC 10-20 (A) /hpf    Bacteria Rare (A) None /hpf    Epithelial Cells Rare Few /hpf   AMMONIA   Result Value Ref Range    Ammonia 19 11 - 45 umol/L   Lactic Acid Every four hours after STAT order   Result Value Ref Range    Lactic Acid 6.8 (HH) 0.5 - 2.0 mmol/L   POCT glucose device results   Result Value Ref Range    POC Glucose, Blood 202 (H) 65 - 99 mg/dL     *Note: Due to a large number of results and/or encounters for the requested time period, some results have not been displayed. A complete set of results can be found in Results Review.        RADIOLOGY/PROCEDURES  CT-HEAD W/O   Final Result      1.  No evidence of acute intracranial process.      2.  Cerebral atrophy as well as periventricular chronic small vessel ischemic change.      3.  Again seen surgical change.         CT-ABDOMEN-PELVIS WITH   Final Result      1.  Bowel wall thickening/edema involving loop of bowel in left mid abdomen suggesting enteritis.      2.   Status post liver transplant.      3.  Low attenuation lesions again noted in the spleen with a larger lesion suggestive of a cyst.      4.  Diverticulosis without evidence of diverticulitis.      DX-CHEST-PORTABLE (1 VIEW)   Final Result      No acute cardiac or pulmonary abnormalities are identified.            COURSE & MEDICAL DECISION MAKING  Pertinent Labs & Imaging studies reviewed. (See chart for details)      Patient presents with abdominal pain vomiting and diarrhea.  There is no evidence of blood in her emesis or stool.  She is having some intermittent crampy pain.  A broad differential diagnosis was considered including but not limited to C. difficile, gastroenteritis, bowel obstruction, colitis, diverticulitis, bowel ischemia, UTI, sepsis complications of previous surgery.    The patient is worked up for the above differential diagnosis with labs and imaging.  She is worked up for the sepsis protocol once I discovered her elevated lactic acid.  She is treated with fluid resuscitation 30 cc/kg, she is cultured and started on antibiotics.    I considered ischemic colitis but in the absence of bloody stool, being guaiac negative I think it is unlikely to be ischemic although does remain the differential diagnosis.    During the patient's work-up I was called to her room emergently because she became unresponsive and had a seizure.  She had a documented hypoxic episode during this time and was placed on oxygen.  She had gradual resolution of her symptoms.  She was little bit postictal and combative and she was given some IV Ativan.    I spoke with her  she does have a history of seizures.  She has a history of head trauma requiring craniotomies and during that time she had a seizure.  She is not on seizure medications.    The remainder of her septic work-up really looks unremarkable.  She is given fluids.  She is not having to be hydrated much more aggressively because of the heart failure and chronic  diuretic use.  The patient is reassessed her abdominal exam is now benign without peritonitis.  She is having some discomfort.  I do not think she has bowel ischemia but again I think this could be the differential diagnosis.  She does require hospitalization for further work-up and treatment.  We will continue to trend her lactic acids and provide supportive care.  I discussed case with the hospitalist and care is transferred at that time.          FINAL IMPRESSION  1. Abdominal pain, unspecified abdominal location     2. Diarrhea, unspecified type     3. Vomiting without nausea, intractability of vomiting not specified, unspecified vomiting type     4. Seizure (HCC)     5. Lactic acidosis         2.   3.         Electronically signed by: Ramses Ortiz M.D., 5/29/2022 4:59 PM

## 2022-05-30 PROBLEM — G93.41 ACUTE METABOLIC ENCEPHALOPATHY: Status: ACTIVE | Noted: 2022-01-01

## 2022-05-30 NOTE — CARE PLAN
Problem: Nutritional:  Goal: Achieve adequate nutritional intake  Description: Advance diet beyond clears when medically feasible. Patient will consume >50% of meals  Outcome: Not Met   See RD note.

## 2022-05-30 NOTE — ED NOTES
Patient returned to room. BC drawn x2. Pt speaking in short sentences and is more cooperative.  at bedside. No needs at this time.

## 2022-05-30 NOTE — ASSESSMENT & PLAN NOTE
Advance diet to regular  Diarrhea has resolved  She has completed 4 days of antibiotics including Zosyn which was then switched over to Rocephin and Flagyl.  C. difficile testing was negative  Culture results pending

## 2022-05-30 NOTE — PROGRESS NOTES
62 years old female with a past medical history of sarcoidosis leading to cirrhosis requiring liver transplantation that was done 2011 currently on immunosuppressive medication, also has chronic obstructive pulmonary disease/pulmonary hypertension.  Also has subdural hematoma s/p craniotomy leading to a seizure disorder on antiepileptics.  Presents with diarrhea abdominal pain nausea and vomiting and seizures with lactic acidosis.    I evaluated the patient for admission.  CT head ordered to rule out intracranial hemorrhage.     She will require admission however CT head is currently pending.

## 2022-05-30 NOTE — CARE PLAN
Problem: Pain - Standard  Goal: Alleviation of pain or a reduction in pain to the patient’s comfort goal  Outcome: Progressing     Problem: Knowledge Deficit - Standard  Goal: Patient and family/care givers will demonstrate understanding of plan of care, disease process/condition, diagnostic tests and medications  Outcome: Progressing     Problem: Hemodynamics  Goal: Patient's hemodynamics, fluid balance and neurologic status will be stable or improve  Outcome: Progressing     Problem: Fluid Volume  Goal: Fluid volume balance will be maintained  Outcome: Progressing     Problem: Urinary - Renal Perfusion  Goal: Ability to achieve and maintain adequate renal perfusion and functioning will improve  Outcome: Progressing     Problem: Skin Integrity  Goal: Skin integrity is maintained or improved  Outcome: Progressing     Problem: Fall Risk  Goal: Patient will remain free from falls  Outcome: Progressing   The patient is Watcher - Medium risk of patient condition declining or worsening         Progress made toward(s) clinical / shift goals:  Medicated patient for pain x2 this shift with good results.  Medicated patient for nausea x1.  No BM this shift.    Patient is not progressing towards the following goals:       13-Feb-2022 13:28

## 2022-05-30 NOTE — ASSESSMENT & PLAN NOTE
Patient's diarrhea at this point has resolved  C. difficile test could not be completed as the patient had no stool to give.  Given the circumstances it is very sure that the patient did not have C. difficile colitis.  Currently no treatment needs to be given as the patient has not had a bowel movement in over 48 hours

## 2022-05-30 NOTE — PROGRESS NOTES
4 Eyes Skin Assessment Completed by PENNY Patel and PENNY Salazar.    Head WDL  Ears WDL  Nose WDL  Mouth WDL  Neck ScabLeft neck small scab with redness  Breast/Chest WDL  Shoulder Blades WDL  Spine WDL  (R) Arm/Elbow/Hand WDL  (L) Arm/Elbow/Hand WDL  Abdomen WDL  Groin WDL  Scrotum/Coccyx/Buttocks Redness and Blanching  (R) Leg WDL  (L) Leg WDL  (R) Heel/Foot/Toe WDL  (L) Heel/Foot/Toe WDL          Devices In Places Tele Box and Nasal Cannula      Interventions In Place Waffle Overlay    Possible Skin Injury Yes    Pictures Uploaded Into Epic No, needs to be completed  Wound Consult Placed N/A  RN Wound Prevention Protocol Ordered No

## 2022-05-30 NOTE — ASSESSMENT & PLAN NOTE
Most recent hemoglobin A1c is only 4.3.  Highly unlikely that the patient is diabetic  Most likely stress response  Patient was started on Accu-Cheks with sliding scale coverage and a diabetic diet.

## 2022-05-30 NOTE — ASSESSMENT & PLAN NOTE
In 2011 patient underwent liver transplant because of end-stage liver disease  Remains on CellCept, tacrolimus, fludrocortisone

## 2022-05-30 NOTE — PROGRESS NOTES
0915 Assessment complete, medicated for nausea. Seizure precautions in place.    Patient is on special isolation for c diff rule out. Pt educated on reason for isolation, how the infection may be transmitted, and how to help prevent transmission to others.Patient educated that special precautions involves staff and visitors wearing PPE, follow standard precautions and perform meticulous hand hygiene in order to prevent transmission of infection.

## 2022-05-30 NOTE — ASSESSMENT & PLAN NOTE
Patient has a history of seizures and at this point has been back on her Keppra and Vimpat.  She is also getting Klonopin which was initially held and with the diarrhea she may have also exacerbated her condition and thus caused a seizure because of this.  Continue seizure monitoring

## 2022-05-30 NOTE — CARE PLAN
The patient is Stable - Low risk of patient condition declining or worsening    Shift Goals  Clinical Goals: control nausea  Patient Goals: rest    Progress made toward(s) clinical / shift goals:  IV zofran given    Patient is not progressing towards the following goals:pt resting in bed      Problem: Pain - Standard  Goal: Alleviation of pain or a reduction in pain to the patient’s comfort goal  Outcome: Not Progressing   Pt still c/o nausea, will address with MD

## 2022-05-30 NOTE — ASSESSMENT & PLAN NOTE
-supportive measures with synthroid supplementation at 137 mcg per day, no change  -latest TSH is 1.130 and this is with in establish normal guidlines

## 2022-05-30 NOTE — THERAPY
Speech Language Pathology   Clinical Swallow Evaluation     Patient Name: Roxana Cuba  AGE:  62 y.o., SEX:  female  Medical Record #: [id number]  Today's Date: 2022     Precautions: Fall Risk    Patient is 62 y.o. female admitted 22 for abd pain and emesis. 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     CT head did not show evidence for acute infarction or hemorrhage.     CXR 22: No acute cardiac or pulmonary abnormalities are identified.       Level of Consciousness: Alert, Awake  Affect/Behavior: Calm, Cooperative  Follows Directives: Yes  Orientation: Self, , General place, Current year  Hearing: Functional hearing  Vision: Functional vision      Prior Living Situation & Level of Function: Seen by SLP on 21 recs for regular diet. Pt reportedly eats a regular diet without difficulty.     Oral Mechanism Evaluation  Facial Symmetry: Equal  Facial Sensation: Equal  Labial Observations: WFL  Lingual Observations: Midline  Dentition: Good  Comments:    Voice  Quality: WFL  Resonance: WFL  Intensity: Appropriate  Cough: WFL  Comments:    Current Method of Nutrition   Oral diet (Clear liquid diet )       Assessment  Positioning: Lewis's (60-90 degrees)  Bolus Administration: Patient  Oxygen Requirements: 4 L Nasal Cannula  Factor(s) Affecting Performance: None    Swallowing Trials  Thin Liquid (TN0): WFL      Comments: Adequate bolus acceptance and containment. Onset of swallow trigger was timely. No overt s/sx of aspiration appreciated across all trials tested including consecutive sips of thins from the straw. Unable to test other consistencies or textures d/t current diet restriction per MD (clear liquids). Pt requesting Zofran at the end of the  session given increase in nausea, however RN reported pt was recently given ~20 min ago. No emesis noted this session.     Clinical Impressions  No clinical signs of oropharyngeal dysphagia w/ trials of thin liquids. SLP following to ensure diet tolerance and advance diet once medically cleared and as tolerated.        Recommendations  1. Continue clear liquid diet   2.  Instrumentation: None indicated at this time  3.  Swallowing Instructions & Precautions:   Supervision: Distant supervision - check on patient 2-3 times per meal  Positioning: Fully upright and midline during oral intake  Medication: Whole with liquid, or as tolerated   Strategies: None  Oral Care: BID      Plan  Recommend Speech Therapy 3 times per week until therapy goals are met for the following treatments:  Dysphagia Training and Patient / Family / Caregiver Education.    Discharge Recommendations: Anticipate that the patient will have no further speech therapy needs after discharge from the hospital       05/30/22 0950   Vitals   O2 (LPM) 4   O2 Delivery Device Nasal Cannula   Prior Level Of Function   Communication Unknown  (hx crani)   Swallow Within Functional Limits   Dentition Intact   Dentures None   Patient's Primary Language English   Short Term Goals   Short Term Goal # 1 Patient will consume a clear liquid diet with no overt s/sx of aspiration.

## 2022-05-30 NOTE — PROGRESS NOTES
Patient arrived to floor via gurney.  Patient transferred to bed via slide board.  Patient alert and orient x4.  Oriented patient to room and call light. Tele box placed.  Patient on 4L via NC.  VSS Seizure precautions in place.  Will continue to monitor.

## 2022-05-30 NOTE — PROGRESS NOTES
TELEMETRY SUMMARY    RYTHYM ST-SR  RATE   ECTOPY fPVC, R TRIGEMMENY/COUPLETS    MEASUREMENTS .16/.08/.44

## 2022-05-30 NOTE — PROGRESS NOTES
Hospital Medicine Daily Progress Note    Date of Service  5/30/2022    Chief Complaint  Roxana Cuba is a 62 y.o. female admitted 5/29/2022 with diarrhea     Hospital Course   62 y.o. 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  Symptoms started on the day of admission.  Most of the history was obtained from emergency department physician, patient chart and patient's  who was present at bedside in the emergency room as the patient was encephalopathic during my bedside evaluation and not answering most of my questions.  Apparently the patient suddenly started to have abdominal pain and vomiting in addition she had multiple frequent loose bowel movements on the day of admission.  In the emergency room the patient had a witnessed tonic-clonic seizure.    Interval Problem Update  Patient was evaluated bedside  Reporting poor memory to what happened yesterday remembers that she did have diarrhea  Diarrhea better today  Stable vitals, on 4 L  Stable lab  Lactic acidosis resolved  Blood cultures with no growth today  Clear liquid diet, escalate as tolerable  Up to chair for meals  PT/OT  Labs in a.m.    I have personally seen and examined the patient at bedside. I discussed the plan of care with patient, bedside RN, charge RN,  and pharmacy.    Consultants/Specialty  None    Code Status  Full Code    Disposition  Patient is not medically cleared for discharge.   Anticipate discharge to to skilled nursing facility.  I have placed the appropriate orders for post-discharge needs.    Review of Systems  Review of  Systems   Constitutional: Positive for malaise/fatigue.   HENT: Negative.    Eyes: Negative.    Respiratory: Negative.    Cardiovascular: Negative.    Gastrointestinal: Positive for abdominal pain.   Genitourinary: Negative.    Musculoskeletal: Negative.    Skin: Negative.    Neurological: Positive for weakness.   Endo/Heme/Allergies: Negative.    Psychiatric/Behavioral: Positive for memory loss.        Physical Exam  Temp:  [36.4 °C (97.6 °F)-37.4 °C (99.4 °F)] 37.1 °C (98.7 °F)  Pulse:  [] 90  Resp:  [15-20] 18  BP: (114-170)/(61-95) 114/61  SpO2:  [93 %-100 %] 99 %    Physical Exam  Constitutional:       Appearance: Normal appearance.   HENT:      Head: Normocephalic and atraumatic.      Mouth/Throat:      Mouth: Mucous membranes are moist.   Eyes:      Extraocular Movements: Extraocular movements intact.      Pupils: Pupils are equal, round, and reactive to light.   Cardiovascular:      Rate and Rhythm: Normal rate and regular rhythm.      Pulses: Normal pulses.      Heart sounds: Normal heart sounds.   Pulmonary:      Effort: Pulmonary effort is normal.      Breath sounds: Normal breath sounds.   Abdominal:      General: Bowel sounds are normal.      Palpations: Abdomen is soft.      Tenderness: There is abdominal tenderness.   Musculoskeletal:         General: No swelling. Normal range of motion.      Cervical back: Normal range of motion and neck supple.   Skin:     General: Skin is warm.      Coloration: Skin is not jaundiced.   Neurological:      General: No focal deficit present.      Mental Status: She is alert and oriented to person, place, and time. Mental status is at baseline.      Cranial Nerves: No cranial nerve deficit.   Psychiatric:         Mood and Affect: Mood normal.         Behavior: Behavior normal.         Thought Content: Thought content normal.         Judgment: Judgment normal.         Fluids    Intake/Output Summary (Last 24 hours) at 5/30/2022 1258  Last data filed at 5/30/2022  0405  Gross per 24 hour   Intake 940 ml   Output --   Net 940 ml       Laboratory  Recent Labs     05/29/22  1430 05/30/22  0220   WBC 4.4* 9.4   RBC 4.79 4.77   HEMOGLOBIN 14.2 14.2   HEMATOCRIT 43.4 43.2   MCV 90.6 90.6   MCH 29.6 29.8   MCHC 32.7* 32.9*   RDW 50.8* 51.8*   PLATELETCT 119* 85*   MPV 8.4* 9.4     Recent Labs     05/29/22  1430 05/30/22  0220   SODIUM 141 138   POTASSIUM 3.7 3.9   CHLORIDE 93* 94*   CO2 24 30   GLUCOSE 206* 131*   BUN 10 8   CREATININE 0.60 0.64   CALCIUM 9.3 8.2*                   Imaging  CT-HEAD W/O   Final Result      1.  No evidence of acute intracranial process.      2.  Cerebral atrophy as well as periventricular chronic small vessel ischemic change.      3.  Again seen surgical change.         CT-ABDOMEN-PELVIS WITH   Final Result      1.  Bowel wall thickening/edema involving loop of bowel in left mid abdomen suggesting enteritis.      2.  Status post liver transplant.      3.  Low attenuation lesions again noted in the spleen with a larger lesion suggestive of a cyst.      4.  Diverticulosis without evidence of diverticulitis.      DX-CHEST-PORTABLE (1 VIEW)   Final Result      No acute cardiac or pulmonary abnormalities are identified.           Assessment/Plan  * Enteritis- (present on admission)  Assessment & Plan  Noted on abdominal/pelvic CT  generalized weakness diarrhea abdominal pain and vomiting, improving  Clear liquid diet, escalate as tolerable  Continue IV Zosyn  Pending stool studies and C. Difficile  Labs on AM    Acute metabolic encephalopathy- (present on admission)  Assessment & Plan  Improving  Secondary to postictal state and dehydration  Enteritis contributing  Continue supportive management antibiotics  Up to chair for all meals as tolerable  Labs in a.m.    Seizure (HCC)- (present on admission)  Assessment & Plan  Likely triggered by diarrhea, intravascular volume depletion and electrolyte disturbances  Continue to monitor calcium, magnesium and  sodium and replace accordingly  Resume home Keppra, Vimpat and clonazepam  Lorazepam as needed for seizures    Diarrhea and abdominal pain- (present on admission)  Assessment & Plan  Imaging shows evidence for bowel wall thickening/edema involving loop of bowel in left mid abdomen suggesting enteritis  Differentials include viral infection, C. difficile colitis     Pending stool labs   Pending Cdiff  Pending rotavirus, crypto, Giardia  Labs on AM    Hyperglycemia- (present on admission)  Assessment & Plan  Prior history of diabetes, not currently on medications however she is hyperglycemic  Diabetic diet.  I will start short acting insulin  Accu-Checks, hypoglycemia protocol     Lactic acidosis- (present on admission)  Assessment & Plan  Resolved    Thrombocytopenia (HCC)- (present on admission)  Assessment & Plan  No evidence of gross bleeding, continue to monitor    Hypothyroidism- (present on admission)  Assessment & Plan  Resume home levothyroxine    Status post liver transplant Dr. [place] ([facility] [place])- (present on admission)  Assessment & Plan  Resume home immunosuppressive medications       VTE prophylaxis: enoxaparin ppx    I have performed a physical exam and reviewed and updated ROS and Plan today (5/30/2022). In review of yesterday's note (5/29/2022), there are no changes except as documented above.

## 2022-05-30 NOTE — H&P
Hospital Medicine History & Physical Note    Date of Service  5/29/2022    Primary Care Physician  Halley Levine M.D.    Consultants  None      Code Status  Full Code    Chief Complaint  Chief Complaint   Patient presents with   • Abdominal Pain   • Vomiting     History of Presenting Illness  Roxana Cuba is a 62 y.o. 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  Symptoms started on the day of admission.  Most of the history was obtained from emergency department physician, patient chart and patient's  who was present at bedside in the emergency room as the patient was encephalopathic during my bedside evaluation and not answering most of my questions.  Apparently the patient suddenly started to have abdominal pain and vomiting in addition she had multiple frequent loose bowel movements on the day of admission.  In the emergency room the patient had a witnessed tonic-clonic seizure.    Review of Systems  Review of Systems   Constitutional: Positive for malaise/fatigue. Negative for chills and fever.   Eyes: Negative for discharge and redness.   Respiratory: Negative for cough, shortness of breath and stridor.    Cardiovascular: Negative for chest pain and leg swelling.   Gastrointestinal: Positive for abdominal pain, diarrhea, nausea and vomiting.   Genitourinary: Negative for flank pain.   Musculoskeletal: Negative for myalgias.   Skin: Negative.    Neurological: Positive for seizures. Negative for focal weakness.   Endo/Heme/Allergies: Does not bruise/bleed easily.   Psychiatric/Behavioral: The patient  is not nervous/anxious.      Past Medical History   has a past medical history of Anemia, Anesthesia, Breath shortness, Bronchitis ( ), Cardiomegaly, Chronic fatigue and malaise, Chronic obstructive pulmonary disease (HCC), Cirrhosis (HCC) (December 2011), CKD (chronic kidney disease) stage 3, GFR 30-59 ml/min (HCC), Diabetes (HCC), Fracture of left foot, GERD (gastroesophageal reflux disease), H/O Clostridium difficile infection (02-17-17), Hemorrhagic disorder (HCC), Hiatus hernia syndrome, High cholesterol, Hypothyroid, Jaundice (2009), Liver transplanted (HCC), Low back pain (02-17-17), Mild aortic regurgitation and aortic valve sclerosis ( ), On home oxygen therapy, Platelet disorder (HCC), Pneumonia, Psychiatric disorder, Pulmonary hypertension (HCC), S/P cholecystectomy, Small bowel obstruction (HCC), Splenomegaly, and VRE (vancomycin-resistant Enterococci).    Surgical History   has a past surgical history that includes pr anesth,nose,sinus surgery; makayla by laparoscopy (9/19/2009); exam under anesthesia (11/11/2009); hysterectomy, total abdominal; gastroscopy-endo (3/12/2010); bronchoscopy-endo (5/29/2012); bronchoscopy-endo (6/19/2012); sigmoidoscopy flex (9/26/2012); recovery (2/27/2013); bronchoscopy-endo (11/15/2013); recovery (1/21/2014); recovery (3/24/2014); hemorrhoidectomy (11/11/2009); gastroscopy (9/28/2012); carpal tunnel release; bone marrow aspiration (12/31/2012); bone marrow biopsy, ndl/trocar (12/31/2012); recovery (3/31/2014); other abdominal surgery (December 2011); bone marrow aspiration (3/16/2015); bone marrow biopsy, ndl/trocar (3/16/2015); lung biopsy open (11/2016); tonsillectomy; other abdominal surgery (); breast biopsy (Left, 2/21/2017); colonoscopy (N/A, 3/27/2017); gastroscopy (N/A, 3/27/2017); gastric bypass laparoscopic; hernia repair; makayla by laparoscopy; other; other (12/11/2017); other; turbinate reduction (Bilateral, 10/4/2018); nasal reconstruction (Bilateral,  10/4/2018); ventral hernia repair robotic xi (N/A, 11/12/2018); craniotomy (Left, 8/4/2021); and pr upper gi endoscopy,diagnosis (N/A, 2/3/2022).     Family History  family history includes Alcohol/Drug in her maternal grandfather, maternal grandmother, and mother; Cancer in her paternal aunt; Diabetes in her father; Heart Disease in her father; Hyperlipidemia in her father and mother; Hypertension in her father; Non-contributory in her mother; Other in her father; Stroke in her father.      Social History   reports that she has never smoked. She has never used smokeless tobacco. She reports previous alcohol use. She reports that she does not use drugs.    Allergies  Allergies   Allergen Reactions   • Nsaids Unspecified     Can not take due to hx of liver transplant    • Rhubarb Anaphylaxis   • Organic Nitrates Unspecified     Nitroglycerin products should be avoided with the use of PDE-5 inhibitors as the combination can result in severe hypotension.  RD clarified with pt: Nitrates in food are okay and pt does not want nitrates to be listed as food allergy. Pt only avoids medications with nitrates.    • Other Drug      Any medication that may interact with cyclosporine, tacrolimus, sirolimus, or prograf (due to hx of liver transplant)    • Tylenol      Liver transplant     Medications  Prior to Admission Medications   Prescriptions Last Dose Informant Patient Reported? Taking?   Ascorbic Acid (VITAMIN C) 500 MG Chew Tab 5/28/2022 at am Significant Other Yes No   Sig: Chew 500 mg every day. Indications: Inadequate Vitamin C   CALCIUM-VITAMIN D PO 5/28/2022 at am Significant Other Yes No   Sig: Take 1 Tablet by mouth every evening.   Galcanezumab-gnlm (EMGALITY) 120 MG/ML Solution Auto-injector 5/27/2022 at Unknown time Significant Other No No   Sig: Inject 1 PEN under the skin every 4 weeks.   albuterol (PROAIR HFA) 108 (90 Base) MCG/ACT Aero Soln inhalation aerosol unlnown at Unknown time Significant Other No No    Sig: Inhale 2 Puffs every four hours as needed for Shortness of Breath (wheezing).   ambrisentan (LETAIRIS) 10 MG tablet 5/28/2022 at am Significant Other No No   Sig: TAKE 1 TABLET BY MOUTH EVERY DAY   clonazePAM (KLONOPIN) 0.5 MG Tab 5/28/2022 at pm Significant Other Yes No   Sig: Take 0.5 mg by mouth 3 times a day.   fludrocortisone (FLORINEF) 0.1 MG Tab 5/28/2022 at am Significant Other No No   Sig: Take 1 Tablet by mouth every day for 90 days. Indications: Blood Pressure Drop Upon Standing   furosemide (LASIX) 20 MG Tab 5/28/2022 at am Significant Other No No   Sig: Take 1 Tablet by mouth every day. Indications: Edema   lacosamide (VIMPAT) 100 MG Tab tablet 5/28/2022 at pm Significant Other Yes No   Sig: Take 100 mg by mouth 2 times a day.   levetiracetam (KEPPRA) 750 MG tablet 5/28/2022 at pm Significant Other Yes No   Sig: Take 750 mg by mouth 2 times a day.   levothyroxine (SYNTHROID) 137 MCG Tab 5/28/2022 at am Significant Other No No   Sig: Take 1 Tablet by mouth every morning on an empty stomach. Indications: Underactive Thyroid   magnesium oxide (MAG-OX) 400 MG Tab tablet 5/28/2022 at am Significant Other Yes No   Sig: Take 400 mg by mouth every day.   metoclopramide (REGLAN) 10 MG Tab 5/28/2022 at pm Significant Other Yes No   Sig: Take 10 mg by mouth 2 times a day.   morphine ER (MS CONTIN) 15 MG Tab CR tablet 5/28/2022 at pm Significant Other No No   Sig: Take 1 Tablet by mouth every 12 hours for 30 days.   mycophenolate (CELLCEPT) 250 MG Cap 5/28/2022 at pm Significant Other Yes No   Sig: Take 250 mg by mouth 2 times a day. Indications: Liver Transplant Recipient   omeprazole (PRILOSEC) 40 MG delayed-release capsule 5/28/2022 at pm Significant Other No No   Sig: Take 1 Capsule by mouth every day.   ondansetron (ZOFRAN ODT) 4 MG TABLET DISPERSIBLE 5/28/2022 at pm Significant Other No No   Sig: DISSOLVE 1 TAB ON TONGUE EVERY 8 HOURS AS NEEDED FOR NAUSEA   Patient taking differently: Take 4 mg by  mouth every 6 hours as needed for Nausea.   oxyCODONE immediate release (ROXICODONE) 10 MG immediate release tablet 5/28/2022 at am Significant Other No No   Sig: Take 1 Tablet by mouth 2 (two) times a day for 30 days. Take 1 tablet by mouth every 6-8 for moderate to severe pain   oxybutynin SR (DITROPAN-XL) 10 MG CR tablet 5/28/2022 at am Significant Other No No   Sig: Take 1 Tablet by mouth every day.   pravastatin (PRAVACHOL) 20 MG Tab 5/28/2022 at am Significant Other No No   Sig: Take 1 Tablet by mouth every day.   sertraline (ZOLOFT) 100 MG Tab 5/28/2022 at am Significant Other No No   Sig: Take 1.5 Tablets by mouth every day.   tacrolimus (PROGRAF) 1 MG Cap 5/28/2022 at am Significant Other No No   Sig: TAKE 4 CAPSULES BY MOUTH EVERY DAY FOR 30 DAYS   Patient taking differently: Take 4 mg by mouth every day.   tadalafil (CIALIS) 20 MG tablet 5/28/2022 at am Significant Other No No   Sig: Take 1 Tablet by mouth every day.   therapeutic multivitamin-minerals (THERAGRAN-M) Tab 5/28/2022 at am Significant Other Yes No   Sig: Take 1 Tablet by mouth every day. Indications: suppliment      Facility-Administered Medications: None     Physical Exam  Temp:  [36.8 °C (98.3 °F)] 36.8 °C (98.3 °F)  Pulse:  [] 76  Resp:  [15-20] 20  BP: (153-170)/(69-95) 159/84  SpO2:  [93 %-100 %] 100 %  Blood Pressure: (!) 165/74   Temperature: 36.8 °C (98.3 °F)   Pulse: 77   Respiration: 15   Pulse Oximetry: 100 %     Physical Exam  Constitutional:       General: She is not in acute distress.     Appearance: She is ill-appearing and diaphoretic.   HENT:      Head: Normocephalic and atraumatic.      Right Ear: External ear normal.      Left Ear: External ear normal.      Nose: No congestion or rhinorrhea.      Mouth/Throat:      Mouth: Mucous membranes are dry.      Pharynx: No oropharyngeal exudate or posterior oropharyngeal erythema.   Eyes:      General: No scleral icterus.        Right eye: No discharge.         Left eye: No  discharge.      Conjunctiva/sclera: Conjunctivae normal.      Pupils: Pupils are equal, round, and reactive to light.   Cardiovascular:      Rate and Rhythm: Tachycardia present.      Heart sounds:     No friction rub. No gallop.   Pulmonary:      Effort: Pulmonary effort is normal.   Abdominal:      General: Abdomen is flat. There is no distension.      Tenderness: There is no abdominal tenderness. There is no guarding.   Musculoskeletal:         General: No swelling.      Cervical back: Neck supple. No rigidity. No muscular tenderness.      Right lower leg: No edema.      Left lower leg: No edema.   Skin:     Capillary Refill: Capillary refill takes 2 to 3 seconds.      Coloration: Skin is pale. Skin is not jaundiced.      Findings: No bruising or erythema.   Neurological:      Mental Status: She is alert and oriented to person, place, and time.   Psychiatric:         Mood and Affect: Mood normal.         Judgment: Judgment normal.       Laboratory:  Recent Labs     05/29/22  1430   WBC 4.4*   RBC 4.79   HEMOGLOBIN 14.2   HEMATOCRIT 43.4   MCV 90.6   MCH 29.6   MCHC 32.7*   RDW 50.8*   PLATELETCT 119*   MPV 8.4*     Recent Labs     05/29/22  1430   SODIUM 141   POTASSIUM 3.7   CHLORIDE 93*   CO2 24   GLUCOSE 206*   BUN 10   CREATININE 0.60   CALCIUM 9.3     Recent Labs     05/29/22  1430   ALTSGPT 27   ASTSGOT 41   ALKPHOSPHAT 101*   TBILIRUBIN 0.4   LIPASE 15   GLUCOSE 206*         No results for input(s): NTPROBNP in the last 72 hours.      No results for input(s): TROPONINT in the last 72 hours.    Imaging:  CT-HEAD W/O   Final Result      1.  No evidence of acute intracranial process.      2.  Cerebral atrophy as well as periventricular chronic small vessel ischemic change.      3.  Again seen surgical change.         CT-ABDOMEN-PELVIS WITH   Final Result      1.  Bowel wall thickening/edema involving loop of bowel in left mid abdomen suggesting enteritis.      2.  Status post liver transplant.      3.  Low  attenuation lesions again noted in the spleen with a larger lesion suggestive of a cyst.      4.  Diverticulosis without evidence of diverticulitis.      DX-CHEST-PORTABLE (1 VIEW)   Final Result      No acute cardiac or pulmonary abnormalities are identified.        Assessment/Plan:  Justification for Admission Status  I anticipate this patient will require at least two midnights for appropriate medical management, necessitating inpatient admission because Patient presents with severe dehydration and marked lactic acidosis appears to be having antritis not tolerating orally.  Sarcoidosis s/p liver transplantation on immunosuppressive medications    * Seizure (HCC)- (present on admission)  Assessment & Plan  Likely triggered by diarrhea, intravascular volume depletion and electrolyte disturbances  Continue to monitor calcium, magnesium and sodium and replace accordingly  Resume home Keppra, Vimpat and clonazepam  Lorazepam as needed for seizures    Acute encephalopathy- (present on admission)  Assessment & Plan  CT head did not show evidence for acute infarction or hemorrhage  Multifactorial, toxic metabolic likely secondary to dehydration, seizures  Anticipate improvement with rehydration  Consider further diagnostic testing /imaging if encephalopathy does not improve with above measures.     Diarrhea and abdominal pain- (present on admission)  Assessment & Plan  Imaging shows evidence for bowel wall thickening/edema involving loop of bowel in left mid abdomen suggesting enteritis  Differentials include viral infection, C. difficile colitis     I will check stool for C. difficile toxins.  I will check for rotavirus, crypto, Giardia.  Started on ceftriaxone in the emergency room, will continue with Zosyn for now, follow on cultures and sensitivities.  Supportive care with intravenous fluids monitor electrolytes and replace accordingly     Lactic acidosis- (present on admission)  Assessment & Plan  Likely  multifactorial, dehydration from diarrhea and secondary to her recent seizures  Intravenous fluids, anticipate improvement with rehydration, continue to monitor    Hyperglycemia- (present on admission)  Assessment & Plan  Prior history of diabetes, not currently on medications however she is hyperglycemic  Diabetic diet.  I will start short acting insulin  Accu-Checks, hypoglycemia protocol     Status post liver transplant Dr. Cnaada ([facility])- (present on admission)  Assessment & Plan  Resume home immunosuppressive medications    Thrombocytopenia (HCC)- (present on admission)  Assessment & Plan  No evidence of gross bleeding, continue to monitor    Hypothyroidism- (present on admission)  Assessment & Plan  Resume home levothyroxine    VTE prophylaxis: SCDs/TEDs thrombocytopenia, recent subdural hematoma

## 2022-05-30 NOTE — DIETARY
"Nutrition services: Day 1 of admit.  Roxana Cuba is a 62 y.o. female with admitting DX of Seizure     Consult received for MST of 3 on nutrition screen due to report of unsure wt loss and poor PO PTA.      Assessment:  Height: 175.3 cm (5' 9\")  Weight: 54 kg (119 lb 0.8 oz)  Body mass index is 17.58 kg/m²., BMI classification: underweight  Diet/Intake: clear liquids    Evaluation:   1. Pt presented w/ abdominal pain and vomiting. 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 Pt also with frequent, loose BMs on day of admission.   2. RD met with pt to discuss wt/PO hx. Pt said that she has lost 36 lb x a couple of months due to poor PO intake of < 50% of normal x a couple of months. She said that PO intake is poor because she never has an appetite. She did not want to add Boost Breeze to meals. She also does not like Boost Plus. She likes pasta. Pt currently is receiving a clear liquid diet. RD encouraged PO intake.   3. Pt noted to have severe muscle loss in temple region.   4. Wt hx reviewed in Epic prior to meeting with pt. Wt loss noted of 19.8% x 5 months and 8.5% x 1.5 months. Wt loss is significant.   5. Pt told this RD that she is afraid of her  coming in to visit her because he yells at her. She wanted him to be able to visit, though. RD passed this on to nursing. Nurse is going to tell MD.  6. Labs: 5/30/22: mag=1.3 (L), glucose=131 (H)  7. Meds: magnesium sulfate  8. Based on pt's report of poor PO, low BMI, loss of muscle mass and wt loss, pt may be at risk for refeeding. Pt currently w/ low magnesium. No phos lab taken. Pt with order for phos and mag tomorrow. She may benefit from phos/mag daily x 5 to 7 days.      Malnutrition Risk: pt meets criteria for severe " malnutrition in the context of acute illness r/t diminished appetite due to report of abdominal pain, vomiting and diarrhea AEB confirmed wt loss of 8.5% x 1 month, report of PO < 50% of normal x a couple months and severe muscle loss noted in temple region.     Recommendations/Plan:   1. Advance diet beyond clears when medically feasible.   2. Encourage intake of >50%  3. Document intake of all meals as % taken in ADL's to provide interdisciplinary communication across all shifts.   4. Daily phos and mag to monitor for refeeding  5. Thiamine supplementation per MD  6. Monitor weight.  7. Nutrition rep will continue to see patient for ongoing meal and snack preferences.     RD will follow.

## 2022-05-31 NOTE — THERAPY
"Speech Language Pathology  Daily Treatment     Patient Name: Roxana Cuba  Age:  62 y.o., Sex:  female  Medical Record #: [id number]  Today's Date: 5/31/2022     Precautions: Fall Risk      Assessment  Pt was seen while upright in semi-eastman's position. Pt self regulated trials of thin liquids by bowl (broth). She declined any other items, including water, tea and jello. Appropriate oral acceptance and sufficient containment. Pt exhibited a cough x1 but was not duplicated thereafter. Voice was dry pre and post oral intake.       Clinical Impressions  The pt presents with a functional oropharyngeal swallow to thin liquids. Pt not medically cleared for a diet advancement at time of tx session.        Recommendations  1.  Clear liquid  2.  Swallowing Instructions & Precautions:   Supervision: Assist with meal tray set up  Positioning: Fully upright and midline during oral intake  Medication: Whole with liquid  Strategies: None  Oral Care: BID  3.  SLP to follow for ongoing education and advancement when medically cleared.    Results and recs d/w pt and RN. Thank you.      Plan  Continue current treatment plan.  Discharge Recommendations: Anticipate that the patient will have no further speech therapy needs after discharge from the hospital    Subjective  RN cleared the pt for speech tx, no acute changes reported. Pt was received awake and alert in bed. She reported ongoing nausea, which limited oral intake.       05/31/22 0915   Charge Group   SLP Swallowing Dysfunction Treatment Swallowing Dysfunction Treatment   Total Treatment Time   SLP Time Spent Yes   SLP Swallowing Dysfunction Treatment (Mins) 15   SLP Total Time Spent 15   Vitals   O2 (LPM) 4   O2 Delivery Device Nasal Cannula   Pain 0 - 10 Group   Therapist Pain Assessment Post Activity Pain Same as Prior to Activity   Patient / Family Goals   Patient / Family Goal #1 \"Can I get Zofran\"   Goal #1 Outcome Progressing as expected   Short Term Goals   Short " Term Goal # 1 Patient will consume a clear liquid diet with no overt s/sx of aspiration.   Goal Outcome # 1 Progressing as expected

## 2022-05-31 NOTE — RESPIRATORY CARE
"   COPD EDUCATION by COPD CLINICAL EDUCATOR  5/31/2022 at 11:19 AM by Farida Nagel RRT     Patient reviewed by COPD education team. Patient does have a history or diagnosis of COPD/Sarcoidosis w/ obstructive and restrictive components and is a non-smoker.  Therefore, patient is not interested in the the COPD program. Is fine with the management she receives from PCP.           COPD Screen  COPD Risk Screening  Do you have a history of COPD?: No    COPD Assessment  COPD Clinical Specialists ONLY  COPD Education Initiated: Yes--Short Intervention (Pt has hx Sarcoidosis, has an inhaler but very rarely uses, she also has a obstructive/restrictive component, pt doesnt have any breathing issues is managing her Sarcoidosis well by her PCP)  DME Company: [facility]  Physician Name: Halley Levine M.D  Pulmonary Follow Up Appointment: 08/10/22  Appt Time: 1130  Pulmonologist Name: Dr Latoya Arango Consulting Physician, Pulmonary Medicine   [facility] & Dr. Gwyn Gaming M.D. appt in August 2022  Referrals Initiated: Yes  Pulmonary Rehab: No  Smoking Cessation: N/A  Hospice: No  Home Health Care: N/A  [facility] Outreach: N/A  [facility]: No  [facility]: No  Private In-Home Care Agency: N/A  Is this a COPD exacerbation patient?: No  (OP) Pulmonary Function Testing:  (Pt doesnt feel she needs one, is breathing fine!)    PFT Results    No results found for: PFT    Meds to Beds  Would the patient like to opt in for Bedside Medication Delivery at Discharge?: No     MY COPD ACTION PLAN     It is recommended that patients and physicians /healthcare providers complete this action plan together. This plan should be discussed at each physician visit and updated as needed.    The green, yellow and red zones show groups of symptoms of COPD. This list of symptoms is not comprehensive, and you may experience other symptoms. In the \"Actions\" column, your healthcare provider has recommended " "actions for you to take based on your symptoms.    Patient Name: Roxana Cuba   YOB: 1960   Last Updated on:     Green Zone:  I am doing well today Actions   •  Usual activitiy and exercise level •  Take daily medications   •  Usual amounts of cough and phlegm/mucus •  Use oxygen as prescribed   •  Sleep well at night •  Continue regular exercise/diet plan   •  Appetite is good •  At all times avoid cigarette smoke, inhaled irritants     Daily Medications (these medications are taken every day):                Yellow Zone:  I am having a bad day or a COPD flare Actions   •  More breathless than usual •  Continue daily medications   •  I have less energy for my daily activities •  Use quick relief inhaler as ordered   •  Increased or thicker phlegm/mucus •  Use oxygen as prescribed   •  Using quick relief inhaler/nebulizer more often •  Get plenty of rest   •  Swelling of ankles more than usual •  Use pursed lip breathing   •  More coughing than usual •  At all times avoid cigarette smoke, inhaled irritants   •  I feel like I have a \"chest cold\"   •  Poor sleep and my symptoms woke me up   •  My appetite is not good   •  My medicine is not helping    •  Call provider immediately if symptoms don’t improve     Continue daily medications, add rescue medications:               Medications to be used during a flare up, (as Discussed with Provider):              Red Zone:  I need urgent medical care Actions   •  Severe shortness of breath even at rest •  Call 911 or seek medical care immediately   •  Not able to do any activity because of breathing    •  Fever or shaking chills    •  Feeling confused or very drowsy     •  Chest pains    •  Coughing up blood                "

## 2022-05-31 NOTE — PROGRESS NOTES
Assumed care for this patient. Patient A&O4 requesting pain medication for back. Assessment done for this patient and this RN agrees with previous assessment for this patient, will document if any changes.

## 2022-05-31 NOTE — CARE PLAN
Problem: Pain - Standard  Goal: Alleviation of pain or a reduction in pain to the patient’s comfort goal  Outcome: Progressing     Problem: Knowledge Deficit - Standard  Goal: Patient and family/care givers will demonstrate understanding of plan of care, disease process/condition, diagnostic tests and medications  Outcome: Progressing     Problem: Hemodynamics  Goal: Patient's hemodynamics, fluid balance and neurologic status will be stable or improve  Outcome: Progressing     Problem: Fluid Volume  Goal: Fluid volume balance will be maintained  Outcome: Progressing   The patient is Stable - Low risk of patient condition declining or worsening    Shift Goals  Clinical Goals: control nausea  Patient Goals: rest    Progress made toward(s) clinical / shift goals:  Medicated patietn for back and abdominal pain x1 this shift with good results.  Holbrook catheter placed this shift draining clear yellow urine to gravity.with good output.    Patient is not progressing towards the following goals:

## 2022-05-31 NOTE — PROGRESS NOTES
Hospital Medicine Daily Progress Note    Date of Service  5/31/2022    Chief Complaint  Roxana Cuba is a 62 y.o. female admitted 5/29/2022 with diarrhea     Hospital Course   62 y.o. 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  Symptoms started on the day of admission.  Most of the history was obtained from emergency department physician, patient chart and patient's  who was present at bedside in the emergency room as the patient was encephalopathic during my bedside evaluation and not answering most of my questions.  Apparently the patient suddenly started to have abdominal pain and vomiting in addition she had multiple frequent loose bowel movements on the day of admission.  In the emergency room the patient had a witnessed tonic-clonic seizure.    Interval Problem Update  Patient was evaluated bedside  She is today endorsing abdominal pain and back pain, thinks her abdominal discomfort is improved with clear liquid diet restrictions, would like to advance at this time.  We will do so cautiously.  Antibiotics reviewed with pharmacy team, they are recommending de-escalation from Zosyn to ceftriaxone with Flagyl.  I think this is entirely reasonable at this time.  Patient has had no bowel movements in the past 48 hours, C. difficile labs will be canceled.  Up to chair for meals  PT/OT  Labs in a.m.    I have personally seen and examined the patient at bedside. I discussed the plan of care with patient, bedside RN, charge RN,  and pharmacy.    Consultants/Specialty  None    Code Status  Full  Code    Disposition  Patient is not medically cleared for discharge.   Anticipate discharge to to skilled nursing facility.  I have placed the appropriate orders for post-discharge needs.    Review of Systems  Review of Systems   Constitutional: Positive for malaise/fatigue.   HENT: Negative.    Eyes: Negative.    Respiratory: Negative.    Cardiovascular: Negative.    Gastrointestinal: Positive for abdominal pain.   Genitourinary: Negative.    Musculoskeletal: Negative.    Skin: Negative.    Neurological: Positive for weakness.   Endo/Heme/Allergies: Negative.    Psychiatric/Behavioral: Positive for memory loss.        Physical Exam  Temp:  [36.4 °C (97.6 °F)-36.6 °C (97.9 °F)] 36.6 °C (97.8 °F)  Pulse:  [66-87] 68  Resp:  [16-18] 16  BP: (102-123)/(51-72) 104/51  SpO2:  [93 %-99 %] 99 %    Physical Exam  Constitutional:       Appearance: Normal appearance.   HENT:      Head: Normocephalic and atraumatic.      Mouth/Throat:      Mouth: Mucous membranes are moist.   Eyes:      Extraocular Movements: Extraocular movements intact.      Pupils: Pupils are equal, round, and reactive to light.   Cardiovascular:      Rate and Rhythm: Normal rate and regular rhythm.      Pulses: Normal pulses.      Heart sounds: Normal heart sounds.   Pulmonary:      Effort: Pulmonary effort is normal.      Breath sounds: Normal breath sounds.   Abdominal:      General: Bowel sounds are normal.      Palpations: Abdomen is soft.      Tenderness: There is abdominal tenderness.   Musculoskeletal:         General: No swelling. Normal range of motion.      Cervical back: Normal range of motion and neck supple.   Skin:     General: Skin is warm.      Coloration: Skin is not jaundiced.   Neurological:      General: No focal deficit present.      Mental Status: She is alert and oriented to person, place, and time. Mental status is at baseline.      Cranial Nerves: No cranial nerve deficit.   Psychiatric:         Mood and Affect: Mood normal.          Behavior: Behavior normal.         Thought Content: Thought content normal.         Judgment: Judgment normal.         Fluids    Intake/Output Summary (Last 24 hours) at 5/31/2022 1544  Last data filed at 5/31/2022 1250  Gross per 24 hour   Intake 1417.08 ml   Output 600 ml   Net 817.08 ml       Laboratory  Recent Labs     05/29/22  1430 05/30/22  0220 05/31/22  0055   WBC 4.4* 9.4 4.1*   RBC 4.79 4.77 4.01*   HEMOGLOBIN 14.2 14.2 11.9*   HEMATOCRIT 43.4 43.2 36.6*   MCV 90.6 90.6 91.3   MCH 29.6 29.8 29.7   MCHC 32.7* 32.9* 32.5*   RDW 50.8* 51.8* 50.4*   PLATELETCT 119* 85* 60*   MPV 8.4* 9.4 9.3     Recent Labs     05/29/22  1430 05/30/22 0220 05/31/22  0055   SODIUM 141 138 138   POTASSIUM 3.7 3.9 3.4*   CHLORIDE 93* 94* 102   CO2 24 30 32   GLUCOSE 206* 131* 129*   BUN 10 8 8   CREATININE 0.60 0.64 0.75   CALCIUM 9.3 8.2* 7.8*                   Imaging  CT-HEAD W/O   Final Result      1.  No evidence of acute intracranial process.      2.  Cerebral atrophy as well as periventricular chronic small vessel ischemic change.      3.  Again seen surgical change.         CT-ABDOMEN-PELVIS WITH   Final Result      1.  Bowel wall thickening/edema involving loop of bowel in left mid abdomen suggesting enteritis.      2.  Status post liver transplant.      3.  Low attenuation lesions again noted in the spleen with a larger lesion suggestive of a cyst.      4.  Diverticulosis without evidence of diverticulitis.      DX-CHEST-PORTABLE (1 VIEW)   Final Result      No acute cardiac or pulmonary abnormalities are identified.           Assessment/Plan  * Enteritis- (present on admission)  Assessment & Plan  Noted on abdominal/pelvic CT  generalized weakness diarrhea abdominal pain and vomiting, improving  Clear liquid diet, escalate as tolerable  Continue IV Zosyn  Pending stool studies and C. Difficile  Labs on AM    Acute metabolic encephalopathy- (present on admission)  Assessment & Plan  Improving  Secondary to postictal  state and dehydration  Enteritis contributing  Continue supportive management antibiotics  Up to chair for all meals as tolerable  Labs in a.m.    Diarrhea and abdominal pain- (present on admission)  Assessment & Plan  Resolved with no bowel movements for 48 hours after C. difficile samples originally ordered for testing.    Seizure (HCC)- (present on admission)  Assessment & Plan  Likely triggered by diarrhea, intravascular volume depletion and electrolyte disturbances  Continue to monitor calcium, magnesium and sodium and replace accordingly  Resume home Keppra, Vimpat and clonazepam  Lorazepam as needed for seizures    Lactic acidosis- (present on admission)  Assessment & Plan  Resolved    Hyperglycemia- (present on admission)  Assessment & Plan  Prior history of diabetes, not currently on medications however she is hyperglycemic  Diabetic diet.  I will start short acting insulin  Accu-Checks, hypoglycemia protocol     Thrombocytopenia (HCC)- (present on admission)  Assessment & Plan  No evidence of gross bleeding, continue to monitor    Hypothyroidism- (present on admission)  Assessment & Plan  Resume home levothyroxine    Status post liver transplant Dr. [place] ([facility])- (present on admission)  Assessment & Plan  Resume home immunosuppressive medications       VTE prophylaxis: enoxaparin ppx    I have performed a physical exam and reviewed and updated ROS and Plan today (5/31/2022). In review of yesterday's note (5/30/2022), there are no changes except as documented above.

## 2022-05-31 NOTE — PROGRESS NOTES
Telemetry Shift Summary    Rhythm SR  HR Range 76-93  Ectopy rPVC  Measurements 0.16/0.08/0.40        Normal Values  Rhythm SR  HR Range    Measurements 0.12-0.20 / 0.06-0.10  / 0.30-0.52
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
## 2022-06-01 NOTE — DISCHARGE INSTRUCTIONS
Discharge Instructions    Discharged to home by car with relative. Discharged via wheelchair, hospital escort: Yes.  Special equipment needed: Not Applicable    Be sure to schedule a follow-up appointment with your primary care doctor or any specialists as instructed.     Discharge Plan:   Diet Plan: Discussed  Activity Level: Discussed  Confirmed Follow up Appointment: Appointment Scheduled  Confirmed Symptoms Management: Discussed  Medication Reconciliation Updated: Yes    I understand that a diet low in cholesterol, fat, and sodium is recommended for good health. Unless I have been given specific instructions below for another diet, I accept this instruction as my diet prescription.   Other diet: regular    Special Instructions: None    Is patient discharged on Warfarin / Coumadin?   No     Depression / Suicide Risk    As you are discharged from this [facility] facility, it is important to learn how to keep safe from harming yourself.    Recognize the warning signs:  Abrupt changes in personality, positive or negative- including increase in energy   Giving away possessions  Change in eating patterns- significant weight changes-  positive or negative  Change in sleeping patterns- unable to sleep or sleeping all the time   Unwillingness or inability to communicate  Depression  Unusual sadness, discouragement and loneliness  Talk of wanting to die  Neglect of personal appearance   Rebelliousness- reckless behavior  Withdrawal from people/activities they love  Confusion- inability to concentrate     If you or a loved one observes any of these behaviors or has concerns about self-harm, here's what you can do:  Talk about it- your feelings and reasons for harming yourself  Remove any means that you might use to hurt yourself (examples: pills, rope, extension cords, firearm)  Get professional help from the community (Mental Health, Substance Abuse, psychological counseling)  Do not be alone:Call your Safe Contact-  someone whom you trust who will be there for you.  Call your local CRISIS HOTLINE 927-6532 or 105-636-1550  Call your local Children's Mobile Crisis Response Team [place] (008) 819-9335 or [employer]  Call the toll free National Suicide Prevention Hotlines   National Suicide Prevention Lifeline 487-847-DPKE (0927)  [facility] 800-SUICIDE (775-8015)  Seizure, Adult  A seizure is a sudden burst of abnormal electrical activity in the brain. Seizures usually last from 30 seconds to 2 minutes. They can cause many different symptoms.  Usually, seizures are not harmful unless they last a long time.  What are the causes?  Common causes of this condition include:  Fever or infection.  Conditions that affect the brain, such as:  A brain abnormality that you were born with.  A brain or head injury.  Bleeding in the brain.  A tumor.  Stroke.  Brain disorders such as autism or cerebral palsy.  Low blood sugar.  Conditions that are passed from parent to child (are inherited).  Problems with substances, such as:  Having a reaction to a drug or a medicine.  Suddenly stopping the use of a substance (withdrawal).  In some cases, the cause may not be known. A person who has repeated seizures over time without a clear cause has a condition called epilepsy.  What increases the risk?  You are more likely to get this condition if you have:  A family history of epilepsy.  Had a seizure in the past.  A brain disorder.  A history of head injury, lack of oxygen at birth, or strokes.  What are the signs or symptoms?  There are many types of seizures. The symptoms vary depending on the type of seizure you have. Examples of symptoms during a seizure include:  Shaking (convulsions).  Stiffness in the body.  Passing out (losing consciousness).  Head nodding.  Staring.  Not responding to sound or touch.  Loss of bladder control and bowel control.  Some people have symptoms right before and right after a seizure  happens.  Symptoms before a seizure may include:  Fear.  Worry (anxiety).  Feeling like you may vomit (nauseous).  Feeling like the room is spinning (vertigo).  Feeling like you saw or heard something before (déjà vu).  Odd tastes or smells.  Changes in how you see. You may see flashing lights or spots.  Symptoms after a seizure happens can include:  Confusion.  Sleepiness.  Headache.  Weakness on one side of the body.  How is this treated?  Most seizures will stop on their own in under 5 minutes. In these cases, no treatment is needed. Seizures that last longer than 5 minutes will usually need treatment. Treatment can include:  Medicines given through an IV tube.  Avoiding things that are known to cause your seizures. These can include medicines that you take for another condition.  Medicines to treat epilepsy.  Surgery to stop the seizures. This may be needed if medicines do not help.  Follow these instructions at home:  Medicines  Take over-the-counter and prescription medicines only as told by your doctor.  Do not eat or drink anything that may keep your medicine from working, such as alcohol.  Activity  Do not do any activities that would be dangerous if you had another seizure, like driving or swimming. Wait until your doctor says it is safe for you to do them.  If you live in the [place] ask your local DMV (department of [employer]) when you can drive.  Get plenty of rest.  Teaching others  Teach friends and family what to do when you have a seizure. They should:  Lay you on the ground.  Protect your head and body.  Loosen any tight clothing around your neck.  Turn you on your side.  Not hold you down.  Not put anything into your mouth.  Know whether or not you need emergency care.  Stay with you until you are better.    General instructions  Contact your doctor each time you have a seizure.  Avoid anything that gives you seizures.  Keep a seizure diary. Write down:  What you think caused each  seizure.  What you remember about each seizure.  Keep all follow-up visits as told by your doctor. This is important.  Contact a doctor if:  You have another seizure.  You have seizures more often.  There is any change in what happens during your seizures.  You keep having seizures with treatment.  You have symptoms of being sick or having an infection.  Get help right away if:  You have a seizure that:  Lasts longer than 5 minutes.  Is different than seizures you had before.  Makes it harder to breathe.  Happens after you hurt your head.  You have any of these symptoms after a seizure:  Not being able to speak.  Not being able to use a part of your body.  Confusion.  A bad headache.  You have two or more seizures in a row.  You do not wake up right after a seizure.  You get hurt during a seizure.  These symptoms may be an emergency. Do not wait to see if the symptoms will go away. Get medical help right away. Call your local emergency services (911 in the [place] Do not drive yourself to the hospital.  Summary  Seizures usually last from 30 seconds to 2 minutes. Usually, they are not harmful unless they last a long time.  Do not eat or drink anything that may keep your medicine from working, such as alcohol.  Teach friends and family what to do when you have a seizure.  Contact your doctor each time you have a seizure.  This information is not intended to replace advice given to you by your health care provider. Make sure you discuss any questions you have with your health care provider.  Document Released: 06/05/2009 Document Revised: 03/06/2020 Document Reviewed: 03/06/2020  [employer] Patient Education © 2020 [employer]  Clonazepam tablets  What is this medicine?  CLONAZEPAM (kloe NA ze vonda) is a benzodiazepine. It is used to treat certain types of seizures. It is also used to treat panic disorder.  This medicine may be used for other purposes; ask your health care provider or pharmacist if you have  questions.  COMMON BRAND NAME(S): Vaishali Rodriguez  What should I tell my health care provider before I take this medicine?  They need to know if you have any of these conditions:  an alcohol or drug abuse problem  bipolar disorder, depression, psychosis or other mental health condition  glaucoma  kidney or liver disease  lung or breathing disease  myasthenia gravis  Parkinson's disease  porphyria  seizures or a history of seizures  suicidal thoughts  an unusual or allergic reaction to clonazepam, other benzodiazepines, foods, dyes, or preservatives  pregnant or trying to get pregnant  breast-feeding  How should I use this medicine?  Take this medicine by mouth with a glass of water. Follow the directions on the prescription label. If it upsets your stomach, take it with food or milk. Take your medicine at regular intervals. Do not take it more often than directed. Do not stop taking or change the dose except on the advice of your doctor or health care professional.  A special MedGuide will be given to you by the pharmacist with each prescription and refill. Be sure to read this information carefully each time.  Talk to your pediatrician regarding the use of this medicine in children. Special care may be needed.  Overdosage: If you think you have taken too much of this medicine contact a poison control center or emergency room at once.  NOTE: This medicine is only for you. Do not share this medicine with others.  What if I miss a dose?  If you miss a dose, take it as soon as you can. If it is almost time for your next dose, take only that dose. Do not take double or extra doses.  What may interact with this medicine?  Do not take this medication with any of the following medicines:  narcotic medicines for cough  sodium oxybate  This medicine may also interact with the following medications:  alcohol  antihistamines for allergy, cough and cold  antiviral medicines for HIV or AIDS  certain medicines for anxiety or  sleep  certain medicines for depression, like amitriptyline, fluoxetine, sertraline  certain medicines for fungal infections like ketoconazole and itraconazole  certain medicines for seizures like carbamazepine, phenobarbital, phenytoin, primidone  general anesthetics like halothane, isoflurane, methoxyflurane, propofol  local anesthetics like lidocaine, pramoxine, tetracaine  medicines that relax muscles for surgery  narcotic medicines for pain  phenothiazines like chlorpromazine, mesoridazine, prochlorperazine, thioridazine  This list may not describe all possible interactions. Give your health care provider a list of all the medicines, herbs, non-prescription drugs, or dietary supplements you use. Also tell them if you smoke, drink alcohol, or use illegal drugs. Some items may interact with your medicine.  What should I watch for while using this medicine?  Tell your doctor or health care professional if your symptoms do not start to get better or if they get worse.  Do not stop taking except on your doctor's advice. You may develop a severe reaction. Your doctor will tell you how much medicine to take.  You may get drowsy or dizzy. Do not drive, use machinery, or do anything that needs mental alertness until you know how this medicine affects you. To reduce the risk of dizzy and fainting spells, do not stand or sit up quickly, especially if you are an older patient. Alcohol may increase dizziness and drowsiness. Avoid alcoholic drinks.  If you are taking another medicine that also causes drowsiness, you may have more side effects. Give your health care provider a list of all medicines you use. Your doctor will tell you how much medicine to take. Do not take more medicine than directed. Call emergency for help if you have problems breathing or unusual sleepiness.  The use of this medicine may increase the chance of suicidal thoughts or actions. Pay special attention to how you are responding while on this  medicine. Any worsening of mood, or thoughts of suicide or dying should be reported to your health care professional right away.  What side effects may I notice from receiving this medicine?  Side effects that you should report to your doctor or health care professional as soon as possible:  allergic reactions like skin rash, itching or hives, swelling of the face, lips, or tongue  breathing problems  confusion  loss of balance or coordination  signs and symptoms of low blood pressure like dizziness; feeling faint or lightheaded, falls; unusually weak or tired  suicidal thoughts or mood changes  Side effects that usually do not require medical attention (report to your doctor or health care professional if they continue or are bothersome):  dizziness  headache  tiredness  upset stomach  This list may not describe all possible side effects. Call your doctor for medical advice about side effects. You may report side effects to FDA at 1-606-FDA-1272.  Where should I keep my medicine?  Keep out of the reach of children. This medicine can be abused. Keep your medicine in a safe place to protect it from theft. Do not share this medicine with anyone. Selling or giving away this medicine is dangerous and against the law.  This medicine may cause accidental overdose and death if taken by other adults, children, or pets. Mix any unused medicine with a substance like cat litter or coffee grounds. Then throw the medicine away in a sealed container like a sealed bag or a coffee can with a lid. Do not use the medicine after the expiration date.  Store at room temperature between 15 and 30 degrees C (59 and 86 degrees F). Protect from light. Keep container tightly closed.  NOTE: This sheet is a summary. It may not cover all possible information. If you have questions about this medicine, talk to your doctor, pharmacist, or health care provider.  © 2020 Elsevier/Gold Standard (2017-05-26 18:46:32)    Gastritis, Adult    Gastritis  is swelling (inflammation) of the stomach. Gastritis can develop quickly (acute). It can also develop slowly over time (chronic). It is important to get help for this condition. If you do not get help, your stomach can bleed, and you can get sores (ulcers) in your stomach.  What are the causes?  This condition may be caused by:  Germs that get to your stomach.  Drinking too much alcohol.  Medicines you are taking.  Too much acid in the stomach.  A disease of the intestines or stomach.  Stress.  An allergic reaction.  Crohn's disease.  Some cancer treatments (radiation).  Sometimes the cause of this condition is not known.  What are the signs or symptoms?  Symptoms of this condition include:  Pain in your stomach.  A burning feeling in your stomach.  Feeling sick to your stomach (nauseous).  Throwing up (vomiting).  Feeling too full after you eat.  Weight loss.  Bad breath.  Throwing up blood.  Blood in your poop (stool).  How is this diagnosed?  This condition may be diagnosed with:  Your medical history and symptoms.  A physical exam.  Tests. These can include:  Blood tests.  Stool tests.  A procedure to look inside your stomach (upper endoscopy).  A test in which a sample of tissue is taken for testing (biopsy).  How is this treated?  Treatment for this condition depends on what caused it. You may be given:  Antibiotic medicine, if your condition was caused by germs.  H2 blockers and similar medicines, if your condition was caused by too much acid.  Follow these instructions at home:  Medicines  Take over-the-counter and prescription medicines only as told by your doctor.  If you were prescribed an antibiotic medicine, take it as told by your doctor. Do not stop taking it even if you start to feel better.  Eating and drinking    Eat small meals often, instead of large meals.  Avoid foods and drinks that make your symptoms worse.  Drink enough fluid to keep your pee (urine) pale yellow.  Alcohol use  Do not drink  alcohol if:  Your doctor tells you not to drink.  You are pregnant, may be pregnant, or are planning to become pregnant.  If you drink alcohol:  Limit your use to:  0-1 drink a day for women.  0-2 drinks a day for men.  Be aware of how much alcohol is in your drink. In the [place] one drink equals one 12 oz bottle of beer (355 mL), one 5 oz glass of wine (148 mL), or one 1½ oz glass of hard liquor (44 mL).  General instructions  Talk with your doctor about ways to manage stress. You can exercise or do deep breathing, meditation, or yoga.  Do not smoke or use products that have nicotine or tobacco. If you need help quitting, ask your doctor.  Keep all follow-up visits as told by your doctor. This is important.  Contact a doctor if:  Your symptoms get worse.  Your symptoms go away and then come back.  Get help right away if:  You throw up blood or something that looks like coffee grounds.  You have black or dark red poop.  You throw up any time you try to drink fluids.  Your stomach pain gets worse.  You have a fever.  You do not feel better after one week.  Summary  Gastritis is swelling (inflammation) of the stomach.  You must get help for this condition. If you do not get help, your stomach can bleed, and you can get sores (ulcers).  This condition is diagnosed with medical history, physical exam, or tests.  You can be treated with medicines for germs or medicines to block too much acid in your stomach.  This information is not intended to replace advice given to you by your health care provider. Make sure you discuss any questions you have with your health care provider.  Document Released: 06/05/2009 Document Revised: 05/07/2019 Document Reviewed: 05/07/2019  [employer] Patient Education © 2020 [employer] Inc.

## 2022-06-01 NOTE — DISCHARGE PLANNING
Care Transition Team Assessment    Pt on 4 liters of oxygen at BL with [facility]. Pt on service with [facility] starting in March.      Information Source  Orientation Level: Oriented X4  Information Given By: Patient  Informant's Name: Roxana  Who is responsible for making decisions for patient? : Patient     Readmission Evaluation  Is this a readmission?: No     Elopement Risk  Legal Hold: No  Ambulatory or Self Mobile in Wheelchair: Yes  Disoriented: No  Psychiatric Symptoms: None  History of Wandering: No  Elopement this Admit: No  Vocalizing Wanting to Leave: No  Displays Behaviors, Body Language Wanting to Leave: No-Not at Risk for Elopement  Elopement Risk: Not at Risk for Elopement     Interdisciplinary Discharge Planning  Primary Care Physician: Elisa Phillip MD  Lives with - Patient's Self Care Capacity: Spouse  Patient or legal guardian wants to designate a caregiver: No  Support Systems: Friends / Neighbors,Spouse / Significant Other  Housing / Facility: 1 Story Apartment / Condo  Prior Services: Continuous (24 Hour) Care Giving Family  Durable Medical Equipment: Home Oxygen  DME Provider / Phone: [facility] (baseline 4L)     Discharge Preparedness  What is your plan after discharge?: Home health care  What are your discharge supports?: Spouse,Child  Prior Functional Level: Ambulatory     Functional Assesment  Prior Functional Level: Ambulatory     Finances  Financial Barriers to Discharge: No  Prescription Coverage: Yes     Vision / Hearing Impairment  Vision Impairment : Yes  Right Eye Vision: Impaired  Left Eye Vision: Impaired  Hearing Impairment : No           Advance Directive  Advance Directive?: DPOA for Health Care  Durable Power of  Name and Contact : Otis [place] [phone]     Domestic Abuse  Have you ever been the victim of abuse or violence?: No  Physical Abuse or Sexual Abuse: No  Verbal Abuse or Emotional Abuse: No  Possible Abuse/Neglect Reported to:: Not  Applicable     Psychological Assessment  History of Substance Abuse: None     Discharge Risks or Barriers  Discharge risks or barriers?: No  Patient risk factors: Complex medical needs     Anticipated Discharge Information  Discharge Disposition: D/T to home under [facility] care in anticipation of covered skilled care (06)

## 2022-06-01 NOTE — THERAPY
"Occupational Therapy   Initial Evaluation     Patient Name: Roxana Cuba  Age:  62 y.o., Sex:  female  Medical Record #: [id number]  Today's Date: 6/1/2022     Precautions  Precautions: Fall Risk    Assessment  Patient is 62 y.o. female with a diagnosis of seizures.  Pt has complex medical history including craniotomy, seizure d/o, falls, recent R elbow fracture.  Has been living at home with assist from spouse for ADL's.  Currently pt able to demo ability to walk with CGA with FWW, and simple dressing tasks supervised from seated.  Spouse present during session and reports they can have her use FWW at home for bit.  He will assist with showers, IADL's as needed.  Pt may benefit from HH therapy, however will likely refuse if offered.  No further therapy needs in this setting, Rec ambulate with nursing as tolerated using FWW.        Plan    Recommend Occupational Therapy for Evaluation only     DC Equipment Recommendations: None  Discharge Recommendations: Recommend home health for continued occupational therapy services     Subjective    \"Home Health is a waste of time.  All they do is read off your list of meds everytime and none of them even know what any of the meds are.\"     Objective       06/01/22 1421   Prior Living Situation   Prior Services Intermittent Physical Support for ADL Per Family   Housing / Facility 1 Story Apartment / Condo   Steps Into Home 0   Steps In Home 0   Bathroom Set up Bathtub / Shower Combination;Grab Bars;Shower Chair   Equipment Owned Front-Wheel Walker;Tub / Shower Seat;Grab Bar(s) In Tub / Shower;Oxygen   Lives with - Patient's Self Care Capacity Spouse   Comments Spouse home and able to assist as needed   Prior Level of ADL Function   Self Feeding Independent   Grooming / Hygiene Independent   Bathing Requires Assist   Dressing Independent   Toileting Independent   Prior Level of IADL Function   Medication Management Requires Assist   Laundry Requires Assist   Kitchen Mobility " Requires Assist   Finances Requires Assist   Home Management Requires Assist   Shopping Requires Assist   Prior Level Of Mobility Supervision With Device in Home   Driving / Transportation Relatives / Others Provide Transportation   Occupation (Pre-Hospital Vocational) Retired Due To Age   Precautions   Precautions Fall Risk   Vitals   Blood Pressure (!) 99/54  (MD notified)   O2 (LPM) 4   O2 Delivery Device Nasal Cannula   Pain 0 - 10 Group   Location Arm   Location Orientation Mid   Description Aching   Therapist Pain Assessment During Activity;Nurse Notified  (elbow still bothers her from previous elbow fx)   Cognition    Cognition / Consciousness X   Level of Consciousness Alert   Comments Pt oriented x4, quick to dismiss suggestions from therapist.  Lydiao's overall displeasure with medical system and was not amenable to any post hospital therapy options that were suggested.   Active ROM Upper Body   Active ROM Upper Body  X   Dominant Hand Right   Comments limited pronation/supination R elbow   Strength Upper Body   Comments appears grossly functional   Balance Assessment   Sitting Balance (Static) Good   Sitting Balance (Dynamic) Fair +   Standing Balance (Static) Fair   Standing Balance (Dynamic) Fair -   Weight Shift Sitting Fair   Weight Shift Standing Fair   Comments Tested with FWW as pt was unsteady without it   Bed Mobility    Supine to Sit Modified Independent   Sit to Supine Modified Independent   Scooting Modified Independent   ADL Assessment   Lower Body Dressing Supervision  (socks)   Toileting   (NT- has ag)   How much help from another person does the patient currently need...   Putting on and taking off regular lower body clothing? 3   Bathing (including washing, rinsing, and drying)? 3   Toileting, which includes using a toilet, bedpan, or urinal? 3   Putting on and taking off regular upper body clothing? 3   Taking care of personal grooming such as brushing teeth? 3   Eating meals? 4   6  Clicks Daily Activity Score 19   Functional Mobility   Sit to Stand Standby Assist   Bed, Chair, Wheelchair Transfer Contact Guard Assist   Toilet Transfers Contact Guard Assist   Transfer Method Stand Step   Mobility CGA with FWW, not safe to walk without the FWW   Distance (Feet) 100   # of Times Distance was Traveled 1   Visual Perception   Visual Perception  WDL   Activity Tolerance   Sitting Edge of Bed 5 min x2   Standing 8 min

## 2022-06-01 NOTE — THERAPY
Physical Therapy   Initial Evaluation     Patient Name: Roxana Cuba  Age:  62 y.o., Sex:  female  Medical Record #: [id number]  Today's Date: 6/1/2022     Precautions  Precautions: Fall Risk    Assessment  Patient is 62 y.o. female with a diagnosis of abdominal pain/ enteritis, encephalopathy.  Pt with flat affect, short answers, memory deficits present but no confusion noted, not interested in any further PT or PT suggestions for home set-up safety, did not think she needs HH.  Upon ambulation without an AD pt was unsteady with + LOB, pt given FWW with improved gait quality and able to ambulate safely SBA. Pt has a FWW at home and states will use it for awhile, spouse present to confirm.  There are no further acute PT needs, recommend ambulation in hallway 3x day with RN staff.   DC PT.    Plan    Recommend Physical Therapy for Evaluation only     DC Equipment Recommendations: None  Discharge Recommendations: Recommend home health for continued physical therapy services        06/01/22 1422   Prior Living Situation   Housing / Facility 1 Story House   Steps Into Home 0   Steps In Home 0   Equipment Owned Front-Wheel Walker   Lives with - Patient's Self Care Capacity Spouse   Comments supportive spouse   Prior Level of Functional Mobility   Bed Mobility Independent   Transfer Status Independent   Ambulation Independent   Assistive Devices Used None   Stairs Independent   Cognition    Level of Consciousness Alert   Comments Oriented x4   Passive ROM Lower Body   Passive ROM Lower Body Not Tested   Active ROM Lower Body    Active ROM Lower Body  WDL   Strength Lower Body   Lower Body Strength  X   Gross Strength Generalized Weakness, Equal Bilaterally   Balance Assessment   Sitting Balance (Static) Fair +   Sitting Balance (Dynamic) Fair   Standing Balance (Static) Fair -   Standing Balance (Dynamic) Poor +   Weight Shift Sitting Fair   Weight Shift Standing Fair   Comments LOB without and AD when attempting to  ambulate   Gait Analysis   Gait Level Of Assist Standby Assist   Assistive Device Front Wheel Walker   Distance (Feet) 120   # of Times Distance was Traveled 1   Deviation Bradykinetic   Comments Pt ambulated 15 ft without an AD Sol with + LOB   Bed Mobility    Supine to Sit Supervised   Sit to Supine Supervised   Functional Mobility   Sit to Stand Contact Guard Assist

## 2022-06-01 NOTE — PROGRESS NOTES
[facility] Medicine Daily Progress Note    Date of Service  6/1/2022    Chief Complaint  Roxana Cuba is a 62 y.o. female admitted 5/29/2022 with abdominal pain    Hospital Course  Patient initially reported to the hospital with generalized abdominal pain.  The abdominal pain is across the lower quadrants.  But it is diffuse and radiating to the upper quadrants as well.  The patient says that she is lost quite a bit of weight.  She is a liver transplant recipient and has been on chronic immunosuppressive therapy.  With this the patient does have a decreased overall energy level as well as appetite.  She however with the most recent findings of gastroenteritis has definitely worsened.  She was placed on antibiotics and antibiotics have seem to work as the patient's diarrhea has resolved and enteritis at this point is coming under good control.  GI did see the patient they recommend outpatient colonoscopy and has been set up for June 21.  Overall patient's condition is improving and if the patient continues to improve well in the next 24 hours she should be able to discharge home.    Interval Problem Update  Continue pain management  Antibiotics to continue  Follow on cultures  C. difficile negative  Advance diet  Outpatient follows with gastroenterology    I have personally seen and examined the patient at bedside. I discussed the plan of care with patient, bedside RN, charge RN,  and pharmacy.    Consultants/Specialty  GI    Code Status  Full Code    Disposition  Patient is not medically cleared for discharge.   Anticipate discharge to to home with close outpatient follow-up.  I have placed the appropriate orders for post-discharge needs.    Review of Systems  Review of Systems   Constitutional: Positive for malaise/fatigue and weight loss. Negative for chills, diaphoresis and fever.   HENT: Negative.    Eyes: Negative.  Negative for double vision.   Respiratory: Negative.  Negative for cough,  hemoptysis and wheezing.    Cardiovascular: Negative.  Negative for chest pain, palpitations and leg swelling.   Gastrointestinal: Positive for abdominal pain and diarrhea. Negative for blood in stool, constipation, heartburn, nausea and vomiting.   Genitourinary: Negative.  Negative for frequency, hematuria and urgency.   Musculoskeletal: Negative.  Negative for joint pain.   Skin: Negative.  Negative for itching and rash.   Neurological: Negative.  Negative for dizziness, focal weakness, seizures, loss of consciousness and headaches.   Endo/Heme/Allergies: Negative.  Does not bruise/bleed easily.   Psychiatric/Behavioral: Negative.  Negative for suicidal ideas. The patient is not nervous/anxious.    All other systems reviewed and are negative.       Physical Exam  Temp:  [36.6 °C (97.8 °F)-36.8 °C (98.2 °F)] 36.8 °C (98.2 °F)  Pulse:  [55-91] 85  Resp:  [16-20] 20  BP: (104-132)/(51-74) 121/74  SpO2:  [98 %-100 %] 100 %    Physical Exam  Vitals and nursing note reviewed. Exam conducted with a chaperone present.   Constitutional:       General: She is awake.      Appearance: She is well-developed and underweight.      Interventions: Nasal cannula in place.   HENT:      Head: Normocephalic and atraumatic.      Right Ear: External ear normal.      Left Ear: External ear normal.      Nose: Nose normal.      Mouth/Throat:      Mouth: Mucous membranes are dry.      Pharynx: Oropharynx is clear.   Eyes:      Extraocular Movements: Extraocular movements intact.      Conjunctiva/sclera: Conjunctivae normal.      Pupils: Pupils are equal, round, and reactive to light.   Neck:      Thyroid: No thyromegaly.      Vascular: No JVD.   Cardiovascular:      Rate and Rhythm: Normal rate and regular rhythm.      Pulses: Normal pulses.      Heart sounds: Normal heart sounds.   Pulmonary:      Effort: Pulmonary effort is normal.      Breath sounds: Normal breath sounds.   Chest:      Chest wall: No tenderness.   Abdominal:       General: Abdomen is flat. Bowel sounds are normal. There is no distension.      Palpations: Abdomen is soft. There is no mass.      Tenderness: There is generalized abdominal tenderness. There is no guarding or rebound.   Musculoskeletal:         General: Tenderness present. Normal range of motion.      Cervical back: Normal range of motion and neck supple.   Lymphadenopathy:      Cervical: No cervical adenopathy.   Skin:     General: Skin is warm and dry.      Capillary Refill: Capillary refill takes less than 2 seconds.      Findings: Erythema present. No rash.   Neurological:      General: No focal deficit present.      Mental Status: She is alert and oriented to person, place, and time. Mental status is at baseline.      Cranial Nerves: No cranial nerve deficit.      Deep Tendon Reflexes: Reflexes are normal and symmetric.   Psychiatric:         Mood and Affect: Mood normal.         Behavior: Behavior normal. Behavior is cooperative.         Thought Content: Thought content normal.         Judgment: Judgment normal.         Fluids    Intake/Output Summary (Last 24 hours) at 6/1/2022 1200  Last data filed at 6/1/2022 0900  Gross per 24 hour   Intake 240 ml   Output 1200 ml   Net -960 ml       Laboratory  Recent Labs     05/30/22 0220 05/31/22 0055 06/01/22  0202   WBC 9.4 4.1* 3.6*   RBC 4.77 4.01* 3.66*   HEMOGLOBIN 14.2 11.9* 10.7*   HEMATOCRIT 43.2 36.6* 34.2*   MCV 90.6 91.3 93.4   MCH 29.8 29.7 29.2   MCHC 32.9* 32.5* 31.3*   RDW 51.8* 50.4* 50.4*   PLATELETCT 85* 60* 64*   MPV 9.4 9.3 10.3     Recent Labs     05/30/22 0220 05/31/22 0055 06/01/22  0202   SODIUM 138 138 135   POTASSIUM 3.9 3.4* 3.1*   CHLORIDE 94* 102 100   CO2 30 32 26   GLUCOSE 131* 129* 133*   BUN 8 8 10   CREATININE 0.64 0.75 0.81   CALCIUM 8.2* 7.8* 8.0*                   Imaging  CT-HEAD W/O   Final Result      1.  No evidence of acute intracranial process.      2.  Cerebral atrophy as well as periventricular chronic small vessel  ischemic change.      3.  Again seen surgical change.         CT-ABDOMEN-PELVIS WITH   Final Result      1.  Bowel wall thickening/edema involving loop of bowel in left mid abdomen suggesting enteritis.      2.  Status post liver transplant.      3.  Low attenuation lesions again noted in the spleen with a larger lesion suggestive of a cyst.      4.  Diverticulosis without evidence of diverticulitis.      DX-CHEST-PORTABLE (1 VIEW)   Final Result      No acute cardiac or pulmonary abnormalities are identified.           Assessment/Plan  * Enteritis- (present on admission)  Assessment & Plan  Advance diet to regular  Diarrhea has resolved  She has completed 4 days of antibiotics including Zosyn which was then switched over to Rocephin and Flagyl.  C. difficile testing was negative  Culture results pending    Acute metabolic encephalopathy- (present on admission)  Assessment & Plan  Encephalopathy has resolved and the patient seems to back to her baseline.    Seizure (HCC)- (present on admission)  Assessment & Plan  Patient has a history of seizures and at this point has been back on her Keppra and Vimpat.  She is also getting Klonopin which was initially held and with the diarrhea she may have also exacerbated her condition and thus caused a seizure because of this.  Continue seizure monitoring    Lactic acidosis- (present on admission)  Assessment & Plan  Resolved    Thrombocytopenia (HCC)- (present on admission)  Assessment & Plan  Negative for acute bleeding  Platelet count this morning is only 64.    Status post liver transplant Dr. [place] ([facility])- (present on admission)  Assessment & Plan  In 2011 patient underwent liver transplant because of end-stage liver disease  Remains on CellCept, tacrolimus, fludrocortisone    Diarrhea and abdominal pain- (present on admission)  Assessment & Plan  Patient's diarrhea at this point has resolved  C. difficile test could not be completed as the patient had no stool to  give.  Given the circumstances it is very sure that the patient did not have C. difficile colitis.  Currently no treatment needs to be given as the patient has not had a bowel movement in over 48 hours    Hyperglycemia- (present on admission)  Assessment & Plan  Most recent hemoglobin A1c is only 4.3.  Highly unlikely that the patient is diabetic  Most likely stress response  Patient was started on Accu-Cheks with sliding scale coverage and a diabetic diet.    Hypothyroidism- (present on admission)  Assessment & Plan  -supportive measures with synthroid supplementation at 137 mcg per day, no change  -latest TSH is 1.130 and this is with in establish normal guidlines          VTE prophylaxis: SCDs/TEDs    I have performed a physical exam and reviewed and updated ROS and Plan today (6/1/2022). In review of yesterday's note (5/31/2022), there are no changes except as documented above.

## 2022-06-01 NOTE — PROGRESS NOTES
Telemetry Shift Summary    Rhythm SR  HR Range 67-88  Ectopy r-oPVC, rPAC  Measurements 0.16/0.08/0.40      Normal Values  Rhythm SR  HR Range:   Measurements: 0.12-0.20/0.06-0.10/0.30-0.52;e

## 2022-06-01 NOTE — CARE PLAN
The patient is Stable - Low risk of patient condition declining or worsening    Shift Goals  Clinical Goals: seizure precautions and control nausea.  Patient Goals: rest    Progress made toward(s) clinical / shift goals:        Problem: Knowledge Deficit - Standard  Goal: Patient and family/care givers will demonstrate understanding of plan of care, disease process/condition, diagnostic tests and medications  Outcome: Progressing     Problem: Urinary - Renal Perfusion  Goal: Ability to achieve and maintain adequate renal perfusion and functioning will improve  Outcome: Progressing     Problem: Skin Integrity  Goal: Skin integrity is maintained or improved  Outcome: Progressing  Note: Patient up out of bed and in chair. Waffle overlay placed on patient's bed per patient request.      Problem: Fall Risk  Goal: Patient will remain free from falls  Outcome: Progressing  Note: Patient calls for assistance. Bed alarm and strip alarm on.        Patient is not progressing towards the following goals:      Problem: Pain - Standard  Goal: Alleviation of pain or a reduction in pain to the patient’s comfort goal  Outcome: Not Progressing  Note: Patient medicated for pain see MAR. Patient requiring more frequent pain coverage. MD changed pain medication from q6hr to q4hrs.

## 2022-06-01 NOTE — PROGRESS NOTES
Notified MD regarding patient requesting pain medication for 8/10 back pain. Patient's PRN medication q6hr which was not in the time frame of patient's request. MD updated to q4hr.     Patient's BP was not within parameters (/51) to receive PRN medications at this time. Patient agrees to wait until BP is within parameters.

## 2022-06-01 NOTE — TELEPHONE ENCOUNTER
Vimpat 100mg Tablet    Request rec'd via FAHEEM, lorena TC via Nicole, TC paid copay $138.37 #180/90 or $48.49 #60/30 notifying liaison Silvia Treviño. - 06/01/2022 4:16pm

## 2022-06-01 NOTE — CARE PLAN
Problem: Pain - Standard  Goal: Alleviation of pain or a reduction in pain to the patient’s comfort goal  Outcome: Not Met     Problem: Knowledge Deficit - Standard  Goal: Patient and family/care givers will demonstrate understanding of plan of care, disease process/condition, diagnostic tests and medications  Outcome: Progressing     Problem: Hemodynamics  Goal: Patient's hemodynamics, fluid balance and neurologic status will be stable or improve  Outcome: Progressing     Problem: Fluid Volume  Goal: Fluid volume balance will be maintained  Outcome: Progressing     Problem: Respiratory  Goal: Patient will achieve/maintain optimum respiratory ventilation and gas exchange  Outcome: Progressing     Problem: Skin Integrity  Goal: Skin integrity is maintained or improved  Outcome: Progressing     Problem: Fall Risk  Goal: Patient will remain free from falls  Outcome: Progressing   The patient is Stable - Low risk of patient condition declining or worsening    Shift Goals  Clinical Goals: seizure precautions and control nausea.  Patient Goals: rest    Progress made toward(s) clinical / shift goals:  Medicated patient for nausea x1 this shift with good results.  Medicated patient for pain x1 this shift with good results.  Patient refused 0000 vitals and BS check due to wanting to get some sleep.    Patient is not progressing towards the following goals:      Problem: Pain - Standard  Goal: Alleviation of pain or a reduction in pain to the patient’s comfort goal  Outcome: Not Met

## 2022-06-02 NOTE — CARE PLAN
The patient is Stable - Low risk of patient condition declining or worsening    Shift Goals  Clinical Goals: pain control  Patient Goals: go home    Progress made toward(s) clinical / shift goals:  pain managed with PRN medication      Problem: Pain - Standard  Goal: Alleviation of pain or a reduction in pain to the patient’s comfort goal  Outcome: Met     Problem: Knowledge Deficit - Standard  Goal: Patient and family/care givers will demonstrate understanding of plan of care, disease process/condition, diagnostic tests and medications  Outcome: Met     Problem: Fall Risk  Goal: Patient will remain free from falls  Outcome: Met       Patient is not progressing towards the following goals:

## 2022-06-02 NOTE — PROGRESS NOTES
Telemetry Shift Summary     Rhythm: SB-SR  HR: 53-66  Ectopy: fPVC, PAC    Measurements: 0.16/0.08/0.48    Normal Values  Rhythm: SR  HR:   Measurements: 0.12-0.20/0.08-0.10/0.30-0.52

## 2022-06-02 NOTE — DISCHARGE PLANNING
Received Choice form at 0834  Agency/Facility Name: [facility]  Referral sent per Choice form @ 0828     Agency[facility]Facility Name: [facility]  Outcome: Per Imani, she left a voicemail regarding the pt's referral. Per Imani, she is going to forward the referral to [facility] because of the pt's insurance.

## 2022-06-02 NOTE — DOCUMENTATION QUERY
[facility]                                                                       Query Response Note      PATIENT:               VICK LI  ACCT #:                  [id number]  MRN:                     [id number]  :                      1960  ADMIT DATE:       2022 2:13 PM  DISCH DATE:          RESPONDING  PROVIDER #:        [id number]           QUERY TEXT:    Based on your clinical judgement and the noted clinical indicators, please select the most appropriate diagnosis for these findings.    The patient's Clinical Indicators include:  This patient record contains documentation of the following clinical indicators per dietary assessment based on (ASPEN) American Society for Parenteral and Enteral Nutrition.  (Need presence of at least 2)  -Per dietary assessment  confirmed weight loss of 8.5% x 1 month - 19.8% loss in x 5 months = significant weight loss   -Per dietary assessment severe muscle loss in the temporal region  She meets severe protein calorie malnutrition in the context of acute illness with diminished appetite due to abdominal pain, vomiting and diarrhea.  Treatment consists of advance diet when feasible, currently on clear liq.  Document all meal intake, daily phos and magnesium levels to monitor for refeeding, thiamine supplementation, monitor weight and nutrition rep to meet with patient ongoing for snack and meal preferences.  Risk of malnutrition is a barrier to the healing process    Thank you,  Mery Tirado, RN, BSN  Clinical    [facility]  Connect via iDub[employer]  X [phone]  [email or web address]  Options provided:   -- Severe protein calorie malnutrition   -- Other level of PCM, Please specify level (e.g. Mild, Severe)   -- Unable to determine   -- other explanation      Query created by: Mery Tirado on 2022 7:48 PM    RESPONSE TEXT:    Severe protein  calorie malnutrition          Electronically signed by:  MIGUEL ANGEL EUCEDA MD 6/2/2022 8:04 AM

## 2022-06-02 NOTE — PROGRESS NOTES
Telemetry Shift Summary    Rhythm SR  HR Range 62-78  Ectopy rPVC/PAC  Measurements 0.16/0.08/0.40      Normal Values  Rhythm SR  HR Range:   Measurements: 0.12-0.20/0.06-0.10/0.30-0.52

## 2022-06-02 NOTE — DISCHARGE PLANNING
ATTN: Case Management  RE: Referral for Home Health    As of 6/2/22, we have accepted the Home Health referral for the patient listed above.    A [facility] clinician will be out to see the patient within 48 hours. If you have any questions or concerns regarding the patient’s transition to Home Health, please do not hesitate to contact us at [phone].      We look forward to collaborating with you,  [facility] Team

## 2022-06-02 NOTE — THERAPY
"Speech Language Pathology  Daily Treatment     Patient Name: Roxana Cuba  Age:  62 y.o., Sex:  female  Medical Record #: [id number]  Today's Date: 6/2/2022       Precautions: Fall Risk    Assessment  Pt seen while sitting UIB with her breakfast meal tray. Diet has been advanced to regular solids by MD. Adequate bite and mastication of toast with complete oral clearance. No overt signs of aspiration noted with thin liquids by cup, carbonated thin liquids by straw and toast. Voice was dry pre and post oral intake. Limited trials accepted by patient d/t ongoing nausea and tx session was terminated.    Clinical Impressions  Pt presents with a functional oropharyngeal swallow. No clinical signs of dysphagia observed across all consistencies. A modified diet is not indicated.       Recommendations  1.  Regular solids (RG7), Thin liquids (TN0)  2.  Swallowing Instructions & Precautions:   Supervision: Assist with meal tray set up  Positioning: Fully upright and midline during oral intake  Medication: Whole with liquid  Strategies: None  Oral Care: BID  3.  No further acute speech pathology services are indicated. Please reconsult if the pt's status changes.    Thank you.      Plan  Discharge secondary to goals met.  Discharge Recommendations: Anticipate that the patient will have no further speech therapy needs after discharge from the hospital    Subjective  Pt was received awake and alert. She was pleasant and cooperative, endorsed ongoing nausea.        06/02/22 0918   Charge Group   SLP Swallowing Dysfunction Treatment Swallowing Dysfunction Treatment   Total Treatment Time   SLP Time Spent Yes   SLP Swallowing Dysfunction Treatment (Mins) 10   SLP Total Time Spent 10   Vitals   O2 (LPM) 3.5   O2 Delivery Device Nasal Cannula   Pain 0 - 10 Group   Therapist Pain Assessment Post Activity Pain Same as Prior to Activity   Patient / Family Goals   Patient / Family Goal #1 \"Can I get Zofran\"   Goal #1 Outcome Goal met "   Short Term Goals   Short Term Goal # 1 Patient will consume a clear liquid diet with no overt s/sx of aspiration.   Goal Outcome # 1 Goal met

## 2022-06-02 NOTE — FACE TO FACE
Face to Face Supporting Documentation - Home Health    The encounter with this patient was in whole or in part the primary reason for home health admission.    Date of encounter:   Patient:                    MRN:                       YOB: 2022  Roxana Cuba  [id number]  1960     Home health to see patient for:  Skilled Nursing care for assessment, interventions & education, Registered dietitian consult, Medical social work consult, Home health aide, Physical Therapy evaluation and treatment and Occupational therapy evaluation and treatment    Skilled need for:  New Onset Medical Diagnosis enteritis with weight loss and diarrhea    Skilled nursing interventions to include:  Comment: continued nutritional support, medication management, education, social support, therapies    Homebound status evidenced by:  Need the aid of supportive devices such as crutches, canes, wheelchairs or walkers. Leaving home requires a considerable and taxing effort. There is a normal inability to leave the home.    Community Physician to provide follow up care: Halley Levine M.D.     Optional Interventions? No      I certify the face to face encounter for this home health care referral meets the CMS requirements and the encounter/clinical assessment with the patient was, in whole, or in part, for the medical condition(s) listed above, which is the primary reason for home health care. Based on my clinical findings: the service(s) are medically necessary, support the need for home health care, and the homebound criteria are met.  I certify that this patient has had a face to face encounter by myself.  Tara Alexis M.D. - NPI: [id number]

## 2022-06-02 NOTE — DISCHARGE PLANNING
Case Management Discharge Planning    Admission Date: 5/29/2022  GMLOS: 4.3  ALOS: 4    6-Clicks ADL Score: 19  6-Clicks Mobility Score: 18      Anticipated Discharge Dispo: Discharge Disposition: Discharged to home/self care (01)    DME Needed: No    Action(s) Taken: Referral(s) sent, Pt previously on service with Lorie . Sent choice for [facility].     1021: SEGUNDO RN placed call to [facility] from [facility] about acceptance for . Awaiting call back from [facility] about pts status.     1025: Received call back [facility] has not received referral. Faxed manually to [phone]    Escalations Completed: None    Medically Clear: Yes    Next Steps: SEGUNDO RN to follow up with HH acceptance    Barriers to Discharge: HH acceptance    Is the patient up for discharge tomorrow: No

## 2022-06-02 NOTE — PROGRESS NOTES
Patient discharged home. IV removed and tele box removed and returned to monitor tech. Patient states all questions and concerns have been addressed appropriately. Verbalized understanding of discharge instructions and to follow up with PCP in 1-2 weeks from discharge. New prescriptions sent to pharmacy.     Patient off the unit via wheelchair.

## 2022-06-02 NOTE — CARE PLAN
The patient is Stable - Low risk of patient condition declining or worsening    Shift Goals  Clinical Goals: Control pain  Patient Goals: Sleep tonight    Progress made toward(s) clinical / shift goals:    Problem: Pain - Standard  Goal: Alleviation of pain or a reduction in pain to the patient’s comfort goal  Outcome: Progressing  Note: Patient complaints of general pain in abdomen and shoulder. Pain controlled per MAR.      Problem: Knowledge Deficit - Standard  Goal: Patient and family/care givers will demonstrate understanding of plan of care, disease process/condition, diagnostic tests and medications  Outcome: Progressing  Note: Discussed POC with patient. Denies questions at this time.      Problem: Fall Risk  Goal: Patient will remain free from falls  Outcome: Progressing  Note: Patient educated on call light usage. Patient calls appropriately and waits for staff to assist her with ambulation.        Patient is not progressing towards the following goals:

## 2022-06-02 NOTE — DISCHARGE SUMMARY
Discharge Summary    CHIEF COMPLAINT ON ADMISSION  Chief Complaint   Patient presents with   • Abdominal Pain   • Vomiting       Reason for Admission  Vomiting, Dehydrated     Admission Date  5/29/2022    CODE STATUS  Full Code    HPI & HOSPITAL COURSE  Ms. Sharon Johanson is a 62-year-old female who was initially admitted because of abdominal pain.  The patient was diagnosed with enteritis and started on antibiotic management for this.  C. difficile was ruled out.  The patient does have a history of liver transplant 10 years ago and she is on chronic immunosuppressive therapy.  Because of this concern was that the patient would have positive infection.  Patient's blood cultures remain negative.  Patient was initially continued on antibiotics and then switched over to Rocephin and Flagyl.  Patient at this point is completing antibiotics on 6/3/2022.  Since patient has stabilized she can discharge home with home health and we will continue another day of antibiotics.  Gastroenterology did see the patient and they recommend outpatient colonoscopy once infection has resolved.  They have set her up for June 21.  Overall patient's condition at this point is stable and she can discharge home with outpatient home health and follow-ups.  Patient at this point is alert awake oriented x3 pleasant cooperative female understands her discharge plan instruction is stable for discharge.    Therefore, she is discharged in good and stable condition to home with close outpatient follow-up.    The patient met 2-midnight criteria for an inpatient stay at the time of discharge.    Discharge Date  6/2/2022    FOLLOW UP ITEMS POST DISCHARGE  Follow-up with the primary care physician in 2 to 3 days  Follow-up with gastroenterology as scheduled on June 21    DISCHARGE DIAGNOSES  Principal Problem:    Enteritis POA: Yes  Active Problems:    Status post liver transplant Dr. Canada ([facility]) POA: Yes      Overview: -December 2011: Status  post liver transplant for end stage liver disease       at [facility], followed by Dr. Canada.                Thrombocytopenia (HCC) POA: Yes    Lactic acidosis POA: Yes    Seizure (HCC) POA: Yes    Acute metabolic encephalopathy POA: Yes    Hypothyroidism POA: Yes    Hyperglycemia POA: Yes    Diarrhea and abdominal pain POA: Yes  Resolved Problems:    * No resolved hospital problems. *      FOLLOW UP  Future Appointments   Date Time Provider Department Center   7/15/2022  2:45 PM Maxwell Phan M.D. [facility] None   8/10/2022 11:30 AM Gwyn Gaming M.D. [facility] None     Halley Levine M.D.  [address] [id number]  [facility] [id number]  [phone]    Schedule an appointment as soon as possible for a visit in 1 week(s)        MEDICATIONS ON DISCHARGE     Medication List      START taking these medications      Instructions   cefdinir 300 MG Caps  Commonly known as: OMNICEF   Take 1 Capsule by mouth 2 times a day for 1 day.  Dose: 300 mg     metroNIDAZOLE 500 MG Tabs  Commonly known as: FLAGYL   Take 1 Tablet by mouth every 8 hours for 1 day.  Dose: 500 mg        CHANGE how you take these medications      Instructions   ondansetron 4 MG Tbdp  What changed: See the new instructions.  Commonly known as: ZOFRAN ODT   DISSOLVE 1 TAB ON TONGUE EVERY 8 HOURS AS NEEDED FOR NAUSEA     tacrolimus 1 MG Caps  What changed: See the new instructions.  Commonly known as: PROGRAF   TAKE 4 CAPSULES BY MOUTH EVERY DAY FOR 30 DAYS        CONTINUE taking these medications      Instructions   albuterol 108 (90 Base) MCG/ACT Aers inhalation aerosol  Commonly known as: ProAir HFA   Inhale 2 Puffs every four hours as needed for Shortness of Breath (wheezing).  Dose: 2 Puff     ambrisentan 10 MG tablet  Commonly known as: LETAIRIS   TAKE 1 TABLET BY MOUTH EVERY DAY  Dose: 10 mg     CALCIUM-VITAMIN D PO   Take 1 Tablet by mouth every evening.  Dose: 1 Tablet     clonazePAM 0.5 MG Tabs  Commonly known as: KLONOPIN   Take 0.5 mg  by mouth 3 times a day.  Dose: 0.5 mg     Emgality 120 MG/ML Soaj  Generic drug: Galcanezumab-gnlm   Inject 1 PEN under the skin every 4 weeks.  Dose: 1 PEN     fludrocortisone 0.1 MG Tabs  Commonly known as: FLORINEF   Take 1 Tablet by mouth every day for 90 days. Indications: Blood Pressure Drop Upon Standing  Dose: 0.1 mg     furosemide 20 MG Tabs  Commonly known as: LASIX   Take 1 Tablet by mouth every day. Indications: Edema  Dose: 20 mg     lacosamide 100 MG Tabs tablet  Commonly known as: VIMPAT   Take 1 Tablet by mouth 2 times a day for 90 days.  Dose: 100 mg     levetiracetam 750 MG tablet  Commonly known as: KEPPRA   Take 750 mg by mouth 2 times a day.  Dose: 750 mg     levothyroxine 137 MCG Tabs  Commonly known as: SYNTHROID   Take 1 Tablet by mouth every morning on an empty stomach. Indications: Underactive Thyroid  Dose: 137 mcg     magnesium oxide 400 MG Tabs tablet  Commonly known as: MAG-OX   Take 400 mg by mouth every day.  Dose: 400 mg     metoclopramide 10 MG Tabs  Commonly known as: REGLAN   Take 10 mg by mouth 2 times a day.  Dose: 10 mg     morphine ER 15 MG Tbcr tablet  Commonly known as: MS CONTIN   Take 1 Tablet by mouth every 12 hours for 30 days.  Dose: 15 mg     mycophenolate 250 MG Caps  Commonly known as: CELLCEPT   Take 250 mg by mouth 2 times a day. Indications: Liver Transplant Recipient  Dose: 250 mg     omeprazole 40 MG delayed-release capsule  Commonly known as: PRILOSEC   Take 1 Capsule by mouth every day.  Dose: 40 mg     oxybutynin SR 10 MG CR tablet  Commonly known as: DITROPAN-XL   Take 1 Tablet by mouth every day.  Dose: 10 mg     oxyCODONE immediate release 10 MG immediate release tablet  Commonly known as: ROXICODONE   Take 1 Tablet by mouth 2 (two) times a day for 30 days. Take 1 tablet by mouth every 6-8 for moderate to severe pain  Dose: 10 mg     pravastatin 20 MG Tabs  Commonly known as: PRAVACHOL   Take 1 Tablet by mouth every day.  Dose: 20 mg     sertraline 100 MG  Tabs  Commonly known as: Zoloft   Take 1.5 Tablets by mouth every day.  Dose: 150 mg     tadalafil 20 MG tablet  Commonly known as: CIALIS   Take 1 Tablet by mouth every day.  Dose: 20 mg     vitamin C 500 MG Chew   Chew 500 mg every day. Indications: Inadequate Vitamin C  Dose: 500 mg        STOP taking these medications    therapeutic multivitamin-minerals Tabs            Allergies  Allergies   Allergen Reactions   • Nsaids Unspecified     Can not take due to hx of liver transplant    • Rhubarb Anaphylaxis   • Organic Nitrates Unspecified     Nitroglycerin products should be avoided with the use of PDE-5 inhibitors as the combination can result in severe hypotension.  RD clarified with pt: Nitrates in food are okay and pt does not want nitrates to be listed as food allergy. Pt only avoids medications with nitrates.    • Other Drug      Any medication that may interact with cyclosporine, tacrolimus, sirolimus, or prograf (due to hx of liver transplant)    • Tylenol      Liver transplant       DIET  Orders Placed This Encounter   Procedures   • Diet Order Diet: Low Fiber(GI Soft)     Standing Status:   Standing     Number of Occurrences:   1     Order Specific Question:   Diet:     Answer:   Low Fiber(GI Soft) [2]       ACTIVITY  As tolerated.  Weight bearing as tolerated    CONSULTATIONS  Gastroenterology    PROCEDURES  None    LABORATORY  Lab Results   Component Value Date    SODIUM 135 06/01/2022    POTASSIUM 3.1 (L) 06/01/2022    CHLORIDE 100 06/01/2022    CO2 26 06/01/2022    GLUCOSE 133 (H) 06/01/2022    BUN 10 06/01/2022    CREATININE 0.81 06/01/2022        Lab Results   Component Value Date    WBC 3.6 (L) 06/01/2022    HEMOGLOBIN 10.7 (L) 06/01/2022    HEMATOCRIT 34.2 (L) 06/01/2022    PLATELETCT 64 (L) 06/01/2022        Total time of the discharge process exceeds 37 minutes.

## 2022-06-03 NOTE — PROGRESS NOTES
Telemetry Shift Summary    Rhythm SR  HR Range 62-91  Ectopy rPVC, PAC  Measurements 0.16/0.08/0.44      Normal Values  Rhythm SR  HR Range:   Measurements: 0.12-0.20/0.06-0.10/0.30-0.52

## 2022-06-03 NOTE — TELEPHONE ENCOUNTER
ESTABLISHED PATIENT PRE-VISIT PLANNING     Patient was NOT contacted to complete PVP.     Note: Patient will not be contacted if there is no indication to call.     1.  Reviewed notes from the last few office visits within the medical group: Yes    2.  If any orders were placed at last visit or intended to be done for this visit (i.e. 6 mos follow-up), do we have Results/Consult Notes?         •  Labs - Labs were not ordered at last office visit.  Note: If patient appointment is for lab review and patient did not complete labs, check with provider if OK to reschedule patient until labs completed.       •  Imaging - Imaging was not ordered at last office visit.       •  Referrals - No referrals were ordered at last office visit.    3. Is this appointment scheduled as a Hospital Follow-Up? Yes, visit was at [facility].     4.  Immunizations were updated in Epic using Reconcile Outside Information activity? Yes    5.  Patient is due for the following Health Maintenance Topics:   Health Maintenance Due   Topic Date Due   • Annual Wellness Visit  Never done   • IMM ZOSTER VACCINES (2 of 2) 01/29/2019   • COVID-19 Vaccine (4 - Booster for Moderna series) 02/09/2022         6.  AHA (Pulse8) form printed for Provider? No, already completed

## 2022-06-03 NOTE — PROGRESS NOTES
RN Transitional Care Management discharge follow up call completed. Patient has questions regarding medications or follow up care. Pt reports she is taking morphine q12h for pain, but it doesn't last the full 12 hours. Rates breakthrough pain 8-9/10 in abdomen below belly button. Pain is relieved with next dose of morphine. Pt requesting new prescription for oxycodone 10mg for breakthrough pain. She states she is entirely out of oxycodone. Per medication list, patient was prescribed 10mg oxycodone by pcp on 5/12/22. Pt states she never picked up that prescription, but she will double check with her pharmacy and call us back. Patient has discharge follow up appointment scheduled with PCP on 6/6/22. Provided CCM RN contact number for any additional questions/concerns.       10:30-patient returned call and gave message to PENNY Llamas that her pharmacy said she picked up her oxycodone 2 days ago. Pt told Muriel this couldn't be true because she was in the hospital at that time. Attempted to call pt back and left VM requesting return call.

## 2022-06-06 NOTE — PROGRESS NOTES
Subjective:     Chief Complaint   Patient presents with   • Follow-Up     From hosp         HPI:   Roxana presents today with follow up from hospital stay. Finished up antibiotics, and doing ok. Trying to gain weight, working on nausea/vomiting. Using zofran and reglan. Believes she continues to have seizure activity despite Keppra and Vimpat use.  Has in the past seen Dr. Portillo as well as Dr. Mc.  She more recently has seen Dr. Chopra in neuropsych.  She has no follow-up visits at this time and is unsure about where her neurology course is going.  She is concerned about brain atrophy mentioned in her chart, small vessel changes.  This has been present for some time but she reports she just read it in her chart on some of her imaging.    Was started on Emgality by Dr Jimenez.     Still struggling with pain. Right arm. Right hand dominant.Seeing Dr Dixon soon. Will call for appt.     She did appropriately finish her antibiotics given to her by the hospital for suspected gastroenteritis.  Overall she is doing better with this.  She does still have some abdominal pain and some diarrhea at times as well.  She has not taken any probiotics at this time.    Current Outpatient Medications Ordered in Epic   Medication Sig Dispense Refill   • morphine ER (MS CONTIN) 15 MG Tab CR tablet Take 1 Tablet by mouth every 12 hours for 30 days. 60 Tablet 0   • tacrolimus (PROGRAF) 1 MG Cap TAKE 4 CAPSULES BY MOUTH EVERY DAY FOR 30 DAYS (Patient taking differently: Take 4 mg by mouth every day.) 360 Capsule 2   • potassium chloride SA (KDUR) 20 MEQ Tab CR Take 1 Tablet by mouth every day. 90 Tablet 3   • lacosamide (VIMPAT) 100 MG Tab tablet Take 1 Tablet by mouth 2 times a day for 90 days. 60 Tablet 2   • ondansetron (ZOFRAN ODT) 4 MG TABLET DISPERSIBLE DISSOLVE 1 TAB ON TONGUE EVERY 8 HOURS AS NEEDED FOR NAUSEA 90 Tablet 0   • clonazePAM (KLONOPIN) 0.5 MG Tab Take 0.5 mg by mouth 3 times a day.     • omeprazole (PRILOSEC) 40 MG  delayed-release capsule Take 1 Capsule by mouth every day. 90 Capsule 3   • oxyCODONE immediate release (ROXICODONE) 10 MG immediate release tablet Take 1 Tablet by mouth 2 (two) times a day for 30 days. Take 1 tablet by mouth every 6-8 for moderate to severe pain 60 Tablet 0   • sertraline (ZOLOFT) 100 MG Tab Take 1.5 Tablets by mouth every day. 135 Tablet 3   • fludrocortisone (FLORINEF) 0.1 MG Tab Take 1 Tablet by mouth every day for 90 days. Indications: Blood Pressure Drop Upon Standing 90 Tablet 2   • tadalafil (CIALIS) 20 MG tablet Take 1 Tablet by mouth every day. 90 Tablet 3   • oxybutynin SR (DITROPAN-XL) 10 MG CR tablet Take 1 Tablet by mouth every day. 30 Tablet 0   • levetiracetam (KEPPRA) 750 MG tablet Take 750 mg by mouth 2 times a day.     • metoclopramide (REGLAN) 10 MG Tab Take 10 mg by mouth 2 times a day.     • CALCIUM-VITAMIN D PO Take 1 Tablet by mouth every evening.     • magnesium oxide (MAG-OX) 400 MG Tab tablet Take 400 mg by mouth every day.     • levothyroxine (SYNTHROID) 137 MCG Tab Take 1 Tablet by mouth every morning on an empty stomach. Indications: Underactive Thyroid 90 Tablet 3   • Galcanezumab-gnlm (EMGALITY) 120 MG/ML Solution Auto-injector Inject 1 PEN under the skin every 4 weeks. 1 Each 5   • Ascorbic Acid (VITAMIN C) 500 MG Chew Tab Chew 500 mg every day. Indications: Inadequate Vitamin C     • albuterol (PROAIR HFA) 108 (90 Base) MCG/ACT Aero Soln inhalation aerosol Inhale 2 Puffs every four hours as needed for Shortness of Breath (wheezing). 8.5 g 3   • furosemide (LASIX) 20 MG Tab Take 1 Tablet by mouth every day. Indications: Edema 30 Tablet 0   • ambrisentan (LETAIRIS) 10 MG tablet TAKE 1 TABLET BY MOUTH EVERY DAY 30 Tablet 11   • pravastatin (PRAVACHOL) 20 MG Tab Take 1 Tablet by mouth every day. 90 Tablet 3   • mycophenolate (CELLCEPT) 250 MG Cap Take 250 mg by mouth 2 times a day. Indications: Liver Transplant Recipient       No current Epic-ordered  "facility-administered medications on file.         ROS:  Gen: no fevers/chills, no changes in weight  Eyes: no changes in vision  ENT: no sore throat, no hearing loss, no bloody nose  Pulm: no sob, no cough  CV: no chest pain, no palpitations  GI: no nausea/vomiting, no diarrhea  : no dysuria  MSk: no myalgias  Skin: no rash  Neuro: no headaches, no numbness/tingling  Heme/Lymph: no easy bruising      Objective:     Exam:  /64 (BP Location: Left arm, Patient Position: Sitting, BP Cuff Size: Adult)   Pulse 72   Temp 36.7 °C (98 °F)   Resp 12   Ht 1.753 m (5' 9\")   Wt 57.6 kg (127 lb)   LMP 01/03/2000   SpO2 100%   BMI 18.75 kg/m²  Body mass index is 18.75 kg/m².    Gen: Alert and oriented, No apparent distress.  Neck: Neck is supple without lymphadenopathy.  Lungs: Normal effort, CTA bilaterally, no wheezes, rhonchi, or rales  CV: Regular rate and rhythm. No murmurs, rubs, or gallops.  Ext: No clubbing, cyanosis, edema.      Assessment & Plan:     62 y.o. female with the following -     1. Nausea and vomiting, intractability of vomiting not specified, unspecified vomiting type  Discussed use of Zofran for nausea and vomiting.  We did also discuss possibility for probiotic and some Florajen is sent in for her today.  Continue to work on nutrition overall.  - ondansetron (ZOFRAN ODT) 4 MG TABLET DISPERSIBLE; Take 2 Tablets by mouth every 8 hours as needed for Nausea.  Dispense: 90 Tablet; Refill: 0    2. Closed fracture of proximal end of right humerus, unspecified fracture morphology, initial encounter  Discussed use of pain meds and breakthrough pain medications.  Discussed only using her oxycodone in between her morphine doses.  She does have follow-up planned with Dr. Darian Mccall but has not had a call for an appointment yet.  - oxyCODONE immediate release (ROXICODONE) 10 MG immediate release tablet; Take 1 Tablet by mouth 2 (two) times a day for 30 days. Take 1 tablet by mouth every 6-8 for moderate " to severe pain  Dispense: 60 Tablet; Refill: 0    3. Anxiety  Refills are sent in for the clonazepam.  Other medications will remain the same for now.  We did discuss the concern for cerebral atrophy.  Unfortunately she has many chronic illnesses which make her at increased risk for this issue with a history of brain surgery as well as seizure disorder.  She also has migraine disorder as well.  We will try to get in touch with Dr. Gomez to see what is the plan and what recommendations they have from their standpoint and also potentially have her see Dr. Mc in follow-up as well.  - clonazePAM (KLONOPIN) 0.5 MG Tab; Take 1 Tablet by mouth 3 times a day for 28 days.  Dispense: 84 Tablet; Refill: 0            No follow-ups on file.    Please note that this dictation was created using voice recognition software. I have made every reasonable attempt to correct obvious errors, but I expect that there are errors of grammar and possibly content that I did not discover before finalizing the note.

## 2022-06-07 NOTE — ED PROVIDER NOTES
ED Provider Note    Scribed for Gwyn Wilson M.D. by Zhen Aleman. 6/6/2022  7:28 PM    Primary care provider: Halley Levine M.D.  Means of arrival: Walk in  History obtained from: Patient  History limited by: None    CHIEF COMPLAINT  Chief Complaint   Patient presents with   • Abdominal Pain     For weeks, right under umbilicus, + nausea, denies vomiting and diarrhea    • Arm Pain     Known R arm fracture from a month ago, increased pain   • Headache     for 3 weeks       HPI  Roxana Cuba is a 62 y.o. female who presents to the Emergency Department for evaluation of moderate abdominal pain   onset last week. The patient states that she suddenly began experiencing abdominal pain 1 week ago without any specific trigger. She localizes the abdominal pain to just above the umbilical region. Associated symptoms include nausea. The patient denies any fever, vomiting, or diarrhea. Patient notes that she was recently seen by her PCP who sent new prescriptions, but the patient reports the pharmacy was unable to release any of them. She has a history of right arm fracture, but has not yet followed up with Orthopedics. Patient is compliant with daily Morphine (BID) and Zofran for chronic pain prescribed by her PCP. No alleviating or exacerbating factors noted.     REVIEW OF SYSTEMS  Pertinent positives include abdominal pain and nausea. Pertinent negatives include no fever, vomiting, or diarrhea.  All other systems reviewed and negative.    PAST MEDICAL HISTORY   has a past medical history of Anemia, Anesthesia, Breath shortness, Bronchitis ( ), Cardiomegaly, Chronic fatigue and malaise, Chronic obstructive pulmonary disease (HCC), Cirrhosis (HCC) (December 2011), CKD (chronic kidney disease) stage 3, GFR 30-59 ml/min (HCC), Diabetes (HCC), Fracture of left foot, GERD (gastroesophageal reflux disease), H/O Clostridium difficile infection (02-17-17), Hemorrhagic disorder (HCC), Hiatus hernia syndrome,  High cholesterol, Hypothyroid, Jaundice (2009), Liver transplanted (HCC), Low back pain (02-17-17), Mild aortic regurgitation and aortic valve sclerosis ( ), On home oxygen therapy, Platelet disorder (HCC), Pneumonia, Psychiatric disorder, Pulmonary hypertension (HCC), S/P cholecystectomy, Small bowel obstruction (HCC), Splenomegaly, and VRE (vancomycin-resistant Enterococci).    SURGICAL HISTORY   has a past surgical history that includes anesth,nose,sinus surgery; makayla by laparoscopy (9/19/2009); exam under anesthesia (11/11/2009); hysterectomy, total abdominal; gastroscopy-endo (3/12/2010); bronchoscopy-endo (5/29/2012); bronchoscopy-endo (6/19/2012); sigmoidoscopy flex (9/26/2012); recovery (2/27/2013); bronchoscopy-endo (11/15/2013); recovery (1/21/2014); recovery (3/24/2014); hemorrhoidectomy (11/11/2009); gastroscopy (9/28/2012); carpal tunnel release; bone marrow aspiration (12/31/2012); bone marrow biopsy, ndl/trocar (12/31/2012); recovery (3/31/2014); other abdominal surgery (December 2011); bone marrow aspiration (3/16/2015); bone marrow biopsy, ndl/trocar (3/16/2015); lung biopsy open (11/2016); tonsillectomy; other abdominal surgery (); breast biopsy (Left, 2/21/2017); colonoscopy (N/A, 3/27/2017); gastroscopy (N/A, 3/27/2017); gastric bypass laparoscopic; hernia repair; makayla by laparoscopy; other; other (12/11/2017); other; turbinate reduction (Bilateral, 10/4/2018); nasal reconstruction (Bilateral, 10/4/2018); ventral hernia repair robotic xi (N/A, 11/12/2018); craniotomy (Left, 8/4/2021); and upper gi endoscopy,diagnosis (N/A, 2/3/2022).    SOCIAL HISTORY  Social History     Tobacco Use   • Smoking status: Never Smoker   • Smokeless tobacco: Never Used   • Tobacco comment: avoid all tobacco products   Vaping Use   • Vaping Use: Never used   Substance Use Topics   • Alcohol use: Not Currently     Alcohol/week: 0.0 oz   • Drug use: No      Social History     Substance and Sexual Activity    Drug Use No       FAMILY HISTORY  Family History   Problem Relation Age of Onset   • Other Father         Unknown (dead 10 years)   • Diabetes Father    • Heart Disease Father    • Hypertension Father    • Hyperlipidemia Father    • Stroke Father    • Non-contributory Mother    • Hyperlipidemia Mother    • Alcohol/Drug Mother    • Cancer Paternal Aunt    • Alcohol/Drug Maternal Grandmother    • Alcohol/Drug Maternal Grandfather        CURRENT MEDICATIONS  Home Medications     Reviewed by Luiza Whitman R.N. (Registered Nurse) on 06/06/22 at 1815  Med List Status: Partial   Medication Last Dose Status   albuterol (PROAIR HFA) 108 (90 Base) MCG/ACT Aero Soln inhalation aerosol  Active   ambrisentan (LETAIRIS) 10 MG tablet  Active   Ascorbic Acid (VITAMIN C) 500 MG Chew Tab  Active   CALCIUM-VITAMIN D PO  Active   clonazePAM (KLONOPIN) 0.5 MG Tab  Active   fludrocortisone (FLORINEF) 0.1 MG Tab  Active   furosemide (LASIX) 20 MG Tab  Active   Galcanezumab-gnlm (EMGALITY) 120 MG/ML Solution Auto-injector  Active   lacosamide (VIMPAT) 100 MG Tab tablet  Active   levetiracetam (KEPPRA) 750 MG tablet  Active   levothyroxine (SYNTHROID) 137 MCG Tab  Active   magnesium oxide (MAG-OX) 400 MG Tab tablet  Active   metoclopramide (REGLAN) 10 MG Tab  Active   morphine ER (MS CONTIN) 15 MG Tab CR tablet  Active   mycophenolate (CELLCEPT) 250 MG Cap  Active   omeprazole (PRILOSEC) 40 MG delayed-release capsule  Active   ondansetron (ZOFRAN ODT) 4 MG TABLET DISPERSIBLE  Active   oxybutynin SR (DITROPAN-XL) 10 MG CR tablet  Active   oxyCODONE immediate release (ROXICODONE) 10 MG immediate release tablet  Active   potassium chloride SA (KDUR) 20 MEQ Tab CR  Active   pravastatin (PRAVACHOL) 20 MG Tab  Active   saccharomyces boulardii (FLORASTOR) 250 MG Cap  Active   sertraline (ZOLOFT) 100 MG Tab  Active   tacrolimus (PROGRAF) 1 MG Cap  Active   tadalafil (CIALIS) 20 MG tablet  Active                ALLERGIES  Allergies   Allergen  "Reactions   • Nsaids Unspecified     Can not take due to hx of liver transplant    • Rhubarb Anaphylaxis   • Organic Nitrates Unspecified     Nitroglycerin products should be avoided with the use of PDE-5 inhibitors as the combination can result in severe hypotension.  RD clarified with pt: Nitrates in food are okay and pt does not want nitrates to be listed as food allergy. Pt only avoids medications with nitrates.    • Other Drug      Any medication that may interact with cyclosporine, tacrolimus, sirolimus, or prograf (due to hx of liver transplant)    • Tylenol      Liver transplant       PHYSICAL EXAM  VITAL SIGNS: /78   Pulse 63   Temp 36.7 °C (98.1 °F) (Tympanic)   Resp 18   Ht 1.753 m (5' 9\")   Wt 57.2 kg (126 lb)   LMP 01/03/2000   SpO2 100%   BMI 18.61 kg/m²     Constitutional: Well developed, Well nourished, moderate distress, Non-toxic appearance. Tearful   HENT: Normocephalic, Atraumatic, Bilateral external ears normal, Slightly dry mucus membranes, No oral exudates.   Eyes: PERRLA, EOMI, Conjunctiva normal, No discharge.   Neck: No tenderness, Supple, No stridor.   Lymphatic: No lymphadenopathy noted.   Cardiovascular: Normal heart rate, Normal rhythm.   Thorax & Lungs: Clear to auscultation bilaterally, No respiratory distress, No wheezing, No crackles.   Abdomen: Soft, Mild periumbilical abdominal tenderness to palpation, No masses, No pulsatile masses.   Skin: Warm, Dry, No erythema, No rash.   Extremities:, No edema No cyanosis.   Musculoskeletal: Tenderness to palpation to the right shoulder with decreased range of motion, No major deformities noted.  Intact distal pulses  Neurologic: Awake, alert. Moves all extremities spontaneously.  Psychiatric: Affect normal, Judgment normal, Mood normal.     LABS  Results for orders placed or performed during the hospital encounter of 06/06/22   CBC with Differential   Result Value Ref Range    WBC 2.9 (L) 4.8 - 10.8 K/uL    RBC 3.63 (L) 4.20 - " 5.40 M/uL    Hemoglobin 10.9 (L) 12.0 - 16.0 g/dL    Hematocrit 33.9 (L) 37.0 - 47.0 %    MCV 93.4 81.4 - 97.8 fL    MCH 30.0 27.0 - 33.0 pg    MCHC 32.2 (L) 33.6 - 35.0 g/dL    RDW 51.1 (H) 35.9 - 50.0 fL    Platelet Count 113 (L) 164 - 446 K/uL    MPV 8.7 (L) 9.0 - 12.9 fL    Neutrophils-Polys 52.20 44.00 - 72.00 %    Lymphocytes 32.50 22.00 - 41.00 %    Monocytes 9.80 0.00 - 13.40 %    Eosinophils 4.50 0.00 - 6.90 %    Basophils 0.70 0.00 - 1.80 %    Immature Granulocytes 0.30 0.00 - 0.90 %    Nucleated RBC 0.00 /100 WBC    Neutrophils (Absolute) 1.49 (L) 2.00 - 7.15 K/uL    Lymphs (Absolute) 0.93 (L) 1.00 - 4.80 K/uL    Monos (Absolute) 0.28 0.00 - 0.85 K/uL    Eos (Absolute) 0.13 0.00 - 0.51 K/uL    Baso (Absolute) 0.02 0.00 - 0.12 K/uL    Immature Granulocytes (abs) 0.01 0.00 - 0.11 K/uL    NRBC (Absolute) 0.00 K/uL   Complete Metabolic Panel   Result Value Ref Range    Sodium 140 135 - 145 mmol/L    Potassium 4.4 3.6 - 5.5 mmol/L    Chloride 103 96 - 112 mmol/L    Co2 31 20 - 33 mmol/L    Anion Gap 6.0 (L) 7.0 - 16.0    Glucose 120 (H) 65 - 99 mg/dL    Bun 6 (L) 8 - 22 mg/dL    Creatinine 0.64 0.50 - 1.40 mg/dL    Calcium 8.8 8.4 - 10.2 mg/dL    AST(SGOT) 28 12 - 45 U/L    ALT(SGPT) 18 2 - 50 U/L    Alkaline Phosphatase 63 30 - 99 U/L    Total Bilirubin <0.2 0.1 - 1.5 mg/dL    Albumin 3.6 3.2 - 4.9 g/dL    Total Protein 5.6 (L) 6.0 - 8.2 g/dL    Globulin 2.0 1.9 - 3.5 g/dL    A-G Ratio 1.8 g/dL   Lipase   Result Value Ref Range    Lipase 10 7 - 58 U/L   Urinalysis    Specimen: Urine, Clean Catch   Result Value Ref Range    Color Yellow     Character Clear     Specific Gravity 1.020 <1.035    Ph 7.0 5.0 - 8.0    Glucose Negative Negative mg/dL    Ketones Trace (A) Negative mg/dL    Protein Negative Negative mg/dL    Bilirubin Negative Negative    Nitrite Negative Negative    Leukocyte Esterase Negative Negative    Occult Blood Negative Negative    Micro Urine Req see below    ESTIMATED GFR   Result Value Ref  Range    GFR (CKD-EPI) 100 >60 mL/min/1.73 m 2     *Note: Due to a large number of results and/or encounters for the requested time period, some results have not been displayed. A complete set of results can be found in Results Review.        RADIOLOGY  CT-ABDOMEN-PELVIS W/O   Final Result      1.  There is no acute inflammatory process within the abdomen or pelvis.   2.  There is extensive postoperative change involving the bowel. There is no bowel obstruction.   3.  There are postoperative changes of liver transplant and cholecystectomy.        The radiologist's interpretation of all radiological studies have been reviewed by me.      COURSE & MEDICAL DECISION MAKING  Pertinent Labs & Imaging studies reviewed. (See chart for details)    I reviewed the patient's medical records which showed an extensive history including liver transplant and chronic shoulder pain. She was recently admitted for enteritis.    7:28 PM - Patient seen and examined at bedside. Patient will be treated with Dilaudid 1 mg and NS Infusion 1000 mL. Ordered UA, Lipase, CMP, CBC with diff, and CT-Abdomen-Pelvis W/O to evaluate her symptoms. . Discussed utilizing labs and imaging to further evaluate, they are amenable    8:07 PM - Ordered Dilaudid 2 mg to treat the patient.     8:51 PM - Patient was reevaluated at bedside. Explained lab and radiology results with the patient. Discussed plan for discharge; I advised the patient to follow-up with her PCP as needed, and to return to the [facility] ED with any new or worsening symptoms. Patient was given the opportunity for questions. I addressed all questions or concerns at this time and they verbalize agreement to the plan of care.     Decision Making:  Patient with chronic pain is coming in with painful complaints including chronic right shoulder pain and abdominal pain.  The patient states her pain has been since she was just recently discharged from the hospital, laboratory tests were unremarkable,  CT scan was negative, we had difficulty giving the patient IV V fluids, ultimately I do not see any emergent condition, the patient will need to follow-up with a pain management doctor, her primary care doctor, GI, return with worsening symptoms.    HYDRATION: Based on the patient's presentation of Dehydration the patient was given IV fluids. IV Hydration was used because oral hydration was not adequate alone. Upon recheck following hydration, the patient was improved.    The patient will return for new or worsening symptoms and is stable at the time of discharge.    The patient is referred to a primary physician for blood pressure management, diabetic screening, and for all other preventative health concerns.    DISPOSITION:  Patient will be discharged home in stable condition.    FOLLOW UP:  [facility], Emergency Dept  [address]  [place] [id number] [phone]    If symptoms worsen      OUTPATIENT MEDICATIONS:  Discharge Medication List as of 6/6/2022  9:54 PM      START taking these medications    Details   promethazine (PHENERGAN) 25 MG Suppos Insert 1 Suppository into the rectum every 6 hours as needed for Nausea/Vomiting., Disp-10 Suppository, R-0, Normal               FINAL IMPRESSION  1. Generalized abdominal pain    2. Dehydration    3. Nausea    4. Chronic right shoulder pain          IZhen (Lee), am scribing for, and in the presence of, Gwyn Wilson M.D..    Electronically signed by: Zhen Aleman (Lee), 6/6/2022    IGwyn M.D. personally performed the services described in this documentation, as scribed by Zhen Aleman in my presence, and it is both accurate and complete.    The note accurately reflects work and decisions made by me.  Gwyn Wilson M.D.  6/6/2022  10:38 PM

## 2022-06-07 NOTE — ED NOTES
"Pts IV infiltrated. Pt states she felt pain but did not want to say anything right away.\" Infiltration score of 2. Hot pack applied. Pt states she does not want another IV. Pt will be d/c to home. Pt currently up to use the restroom. Pt has voided x 2 since arrival to the ED.  "

## 2022-06-07 NOTE — ED TRIAGE NOTES
Pt to triage in wheelchair with 02 from home  Pt sobbing  Chief Complaint   Patient presents with   • Abdominal Pain     For weeks, right under umbilicus, + nausea, denies vomiting and diarrhea    • Arm Pain     Known R arm fracture from a month ago, increased pain   • Headache     for 3 weeks   Pt A & 0 x 4, speech clear  Lab texted for blood draw  Pt updated on triage process and asked to inform RN of any changes while waiting in lobby.

## 2022-06-07 NOTE — ED NOTES
Pt and  verbalized understanding of d/c instructions and medication to . Pt left ambulatory with her home O2 tank with a steady gait.

## 2022-06-08 NOTE — TELEPHONE ENCOUNTER
Emgality 120mg/ml SOAJ    Request rec'd via [facility], ran TC via Nicole, TC paid copay $90.48 #3ml/90 DS or $31.72 #1ml/30 DS notifying liaison Karyn Kline - 06/08/2022 12:37pm

## 2022-06-08 NOTE — PROGRESS NOTES
CONFIDENTIAL NEUROPSYCHOLOGICAL EVALUATION    Patient name: Roxana Cuba  Referral source: Dl Jimenez M.D.   MRN: [id number]  Evaluation dates: 05/04/2022 & 05/12/2022   YOB: 1960  Neuropsychologist: Leighton Chopra, Ph.D.                                                     This evaluation was conducted for clinical treatment planning and may not be valid for other purposes. Potential risks and benefits, limits of confidentiality, and test procedures were discussed. Following this discussion, Ms. Cuba consented to complete the evaluation. Information for this report was obtained from the medical record, a clinical interview with Ms. Cuba and her  conducted on 05/04/2022, and neuropsychological testing conducted on 05/12/2022. Feedback, including review of results and recommendations, was conducted on 07/22/2022.     Background and Referral Information: Ms. 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 Dr. Jimenez referred Ms. Cuba for a neuropsychological evaluation to characterize her cognitive concerns, aid in differential diagnosis, and treatment planning.     Ms. Cuba and her  reported that she fell at their house and hit her head on a shoe rack while they were in [facility]. Ms. Cuba experienced significant post-concussion symptoms including mild memory difficulties and was hospitalized for 2 to 3 days. She reported she decided to travel back to the [place] following [place] to seek further treatment as needed.  Approximately 2 weeks following the initial fall and [place] the [place], her symptoms worsened and she was hospitalized at [facility]. Per her medical record, she presented to [facility] with a headache and visual changes on 08/03/2021. MRI of the brain revealed a 29 mm subacute/chronic left SDH and SAH adjacent to left frontal-temporal lobes with 6 mm midline shift toward the right. She underwent a craniotomy. She then had three seizures and was started on phenytoin. After being seizure free, this was switched to Vimpat. She was discharged home on 08/17/2021, with organized home healthcare and close outpatient follow-up. Residual symptoms included language problems (mostly word finding), memory difficulty, and right-sided weakness and clumsiness that interferes with walking at times. She has not had any other seizures. Ms. Cuba and her  reported she has not engaged in physical, occupational, or speech therapy since being discharged.     Previous Studies: Neurological exam (03/09/2022) was remarkable for: “Slight slowing of processing speed, facial movements are symmetric though the right nasolabial fold is slightly diminished at rest. Sensory exam does reveal a slight subjective decrement of pin and temperature over the right side of the face when compared to the left. There is a slight weakness with the right upper and lower extremity only. Reflexes show absent ankle jerks bilaterally, but knee jerk, biceps and triceps are slightly brisker on the right when compared to the left. There is a slight degree of spread on the right which is not seen on the left at the knee, pectoralis is brisker on the right than on the left. The right toe is equivocal, the left downgoing on plantar stimulation. Repetitive movements are slowed with the right hand and foot when compared to the left. Movements of the right upper extremity are slowed though there is no appendicular dystaxia on either side in the upper extremities. As she walks there is slight circumduction with the right leg only. Sensory exam reveals a subjective decrement of temperature and pinprick on the right side  "of the body including arm and leg when compared to the left.” MRI of the brain (08/03/2021) documented: \"There is subacute/chronic subdural and subarachnoid hemorrhages adjacent to the left frontal and temporal lobes. The maximum transverse dimension measures an approximately 29 mm. There is an approximately 6 mm midline shift towards right side. Mild cerebral volume loss. Mild chronic microvascular ischemic disease.\" CT of the head (05/29/2022) revealed: “There is no evidence of mass or mass effect. CSF spaces are prominent. There is mild periventricular chronic small vessel ischemic change present. There is no intracranial hemorrhage seen. There are surgical changes consistent with prior left frontal and temporal craniotomies. There is a metallic focus, which is located within the sphenoid bone, which is of uncertain significance. There is no scalp injury. There is no evidence of sinus disease.” CT of the head (03/19/2022) revealed: “There is no acute intracranial hemorrhage. Stable postoperative changes of left frontal parietal craniotomy with adjacent underlying dural thickening. Stable mild frontal atrophy.”    Neuropsychological Findings and Impressions    Presenting Concerns Summary: Ms. Cuba and her  reported she experienced significant language and memory difficulties following the SDH and SAH in August 2021. There were no reports of cognitive problems in Ms. Cuba before the brain injury. Her  explained that there was some improvement in Ms. Cuba's cognitive functioning within 6 months from the SDH and SAH, but she has not returned to her pre-injury baseline. Currently, they noted she has difficulties with short-term memory, attention, processing speed, word finding, and aspects of executive functioning. She receives assistance with all instrumental activities of daily living (ADLs) and remains independent with basic ADLs, though physical problems interfere at times. She " reported moderate to severe symptoms of depression and anxiety on self-report measures. Upon further questioning during the clinical interview, she reported milder symptoms of depression and anxiety. Her  reported observing increased frustration and mild depression (please see below the recommendations section for additional information).     Results Summary/Interpretation: Ms. Cuba's premorbid level of intellectual functioning was estimated in the average to high average range. Processing speed was largely impaired. Additional deficits were detected in auditory attention/working memory, aspects of language (semantic verbal fluency and naming), and most aspects of executive function (response inhibition, response inhibition/switching, clock drawing, phonemic verbal fluency, and semantic verbal set shifting). Abstract verbal and visual reasoning were intact. Memory was variable. [place] verbal memory (wordlist) was impaired, while structured verbal memory (stories) was intact (encoding, delayed recall, and recognition). Visual memory was characterized by deficient encoding and retrieval, but improvement to within normal limits with recognition cues. This overall pattern of memory performance is indicative of executive dysfunction interfering with encoding and retrieval of information, as scores improved with added structure and visual recognition cues. There was also an isolated below expectation performance on a measure of visual perception/construction (mental arrangement of puzzle pieces) that was likely due to executive dysfunction as this is a measure that involves visual organization. All other aspects of visual perception/construction were intact. Her performance on all the remaining measures across cognitive domains was within normal limits. On a self-report measure of observed frontal lobe mediated behaviors, her  reported a clinically significant increase in behaviors related to apathy,  disinhibition, and executive dysfunction since the brain injury in 2021.     Impression: Ms. Cuba's cognitive profile revealed impairments in processing speed, attention/working memory, aspects of language, and most aspects of executive function. These impairments likely interfered with memory processes (encoding and retrieval). Her pattern of deficits is suggestive of marked frontal-subcortical networks dysfunction with some left temporal involvement as well. Based on her history, cognitive profile, and reported functional status, she meets criteria for major neurocognitive disorder. Etiology is most consistent her history of traumatic SDH and SAH, as her pattern of language and executive function deficits largely aligns with the areas affected (left frontal-temporal lobes). Declines in attention/working memory and processing speed can also be seen following this type of brain injury. Her 's observed behavioral changes are also consistent with frontal lobe dysfunction. This is further corroborated by CT scan findings of mild frontal atrophy. Additional contributing factors to her cognitive deficits include migraines, chronic right arm pain, mild untreated sleep apnea (per her report), and medication effects (e.g., morphine, oxycodone, and alprazolam). Elevated symptoms of depression and anxiety are also likely exacerbating her cognitive difficulties, but do not fully account for her deficits. Her current pattern of memory performance (intact story memory and visual memory recognition) is not consistent with what is typically seen in Alzheimer's disease. However, given that deficits were detected across multiple domains, a repeat neuropsychological evaluation will be necessary to completely rule out the possibility of an underlying neurodegenerative disorder. Most of the cognitive recovery following brain injuries occurs within the first year; as such, she is encouraged to engage in rehabilitation  therapies. It will be important to monitor her cognition over time as her current neurocognitive disorder diagnosis may be updated if she regains independence with complex instrumental ADLs. This evaluation may serve as a baseline for longitudinal tracking.    Recommendations:    Based on the present results, Ms. Cuba is recommended for a repeat neuropsychological evaluation in the next 12 to 18 months or sooner, if there is a considerable change in cognitive or emotional status. At that time, diagnostic impressions and treatment recommendations will be revised and updated as necessary. Additional testing will permit comparisons over time to better identify stability, potential improvement, or possible decline.   Ms. Cuba may benefit from the initiation of individual counseling sessions and a psychiatry consultation to further assess and treat depressive and anxiety symptoms and additional emotional support. A referral was placed for [facility]. The following are options for services in the community:  [facility] (https://www.[employer]/health-services/behavioral-health; 238.432.8769 or 633-018-2145)  [facility] (https://www.[employer]; 618.120.3866)  [facility] (https://[employer].ProFounder; 675.975.1884)  [facility] (https://www.[facility].ProFounder/index.html; 380.337.7443)  [facility] (https://[employer]ounder; 611.556.9387)   A directory of providers can also be found on the Psychology Today website (www.psychologytoday.com)  Based on her cognitive profile and the timeframe of her brain injury, Ms. Cuba is encouraged to attend speech or occupational therapy sessions with a cognitive rehabilitation component. A referral was placed for [facility] (https://[employer].ProFounder/; [phone]). Given her mobility problems, she may also benefit from a referral to  physical therapy to reduce fall risk and she is encouraged to discuss this with her primary care physician.   Ms. Cuba reported migraines and chronic pain that are likely contributing to her cognitive problems. As such, she is encouraged to continue with headache and pain management treatment. In this regard, she is also encouraged to discuss the possible cognitive burden of her current medication regimen with her prescribing physicians.  Increased oversight and management of instrumental activities of daily living, particularly financial and medication/health management are advised in light of her cognitive deficits. Her  reported being the main caregiver for Ms. Cuba, as such increasing in-home assistance (e.g., hiring a caregiver and continued home health) are good options to consider. Information about home health agencies can be found on the Alzheimer's Association's website (www.alz.org). The current level of assistance with daily activities should not be reduced.  Continued accompaniment to medical appointments by trusted others and receipt of written instructions is recommended to ensure comprehension, later recall, and follow-through of advice given by providers. In light of her cognitive deficits, close monitoring of Ms. Cuba when she is outside her home is recommended to ensure her safety, particularly in unfamiliar locations.  Continued oversight/management and assistance from family members or trusted others with complex decision-making (e.g., legal, financial, medical) are also recommended. It is advised that Ms. Cuba no longer engage in this type of decision making alone given her cognitive deficits. In this regard, treatment planning decisions (e.g., whether to engage in rehabilitation therapies) should be deferred to her  and daughter, who have durable power of .   In light of her cognitive deficits, it is recommended that Ms. Cuba continue to refrain  from driving to ensure her safety and that of others on the road. If this becomes an area of concern, a formal driving evaluation conducted by an occupational therapist with training and expertise in this area should conducted:  [facility] (https://www.[facility].org/Health-Services/Physical-Therapy; 686.928.4608)  [facility] request for  re-evaluation (https://[employer]/pdfforms/dld23a.pdf)  Ms. Cuba may benefit from the use of environmental compensatory strategies to help outsource some of the difficulties associated with her cognitive decline. These may include having/setting scheduled routines, having a specific place for important items (e.g., phone, keys, wallet), doing one task at a time, minimizing distractions, and use of external aids for reminders (e.g., planner, setting alarms, labels, notebooks, checklists/to-do lists) consistently.   Given Ms. Cuba's current difficulties with mobility, in-home changes to minimize fall risk are strongly recommended. These may include increase lighting throughout the house and ensure that lighting is readily available when getting up in the middle of the night. Keep floors clutter-free and remove small throw rugs or use double-sided tape to keep the rugs from slipping. Install grab bars in locations where they will be conveniently usable. Continued use of a hand-held shower and shower chair to reduce risk while bathing. Use of a cane or a walker, particularly in outdoor or unfamiliar environments, may also be beneficial.  Ms. Cuba is encouraged to regularly engage in social and physical recreation, as well as cognitively stimulating activities (e.g., puzzles, games, exercise, social gatherings) to promote brain health and emotional well-being.  When planning and carrying out activities with Ms. Cuba, it is recommended that friends and family:   Be flexible and patient   Encourage supervised involvement in daily life  activities, such as household chores, gardening, walking, or any preferred activity that builds on current skills and brings meaning, purpose, and fannie  Avoid correcting her (unless safety is a concern)   Simplify instructions using single-steps and unsophisticated language   Establish a familiar daily routine  Acknowledge and respond to her feelings  Simplify structure by breaking activities into simple and easy to follow steps while supervision  Provide encouragement and support   If not already addressed, Ms. Cuba and her family members are encouraged to discuss legal issues such as guardianship, advanced directives, and/or establishing a will to assist her in future financial and healthcare decisions. Information regarding these issues can be found at www.caringinfo.org or www.agingwithdignity.org/5wishes.html. Consultation with an elder care  can also be helpful.   Ms. Cuba and her family may benefit from resources available through [facility] [place] (https://dementiafriendly[place].org/) and the American Heart Association (https://www.heart.org/ or 1-264.313.7383), which offer psychoeducational information and services for individuals with cerebrovascular disease and different types of major neurocognitive disorder.  Many individuals who have experienced a traumatic brain injury struggle with changes in cognition and family adjustment. Education about recovery from brain injuries, as well as individual and family-based emotional support, such as support groups, individual counseling, or supportive educational opportunities may be beneficial:   The Brain Injury Association of [place] website (www.biausa.org) includes education, strategies to improve cognition, and additional resources.   The Head Injury Association of [facility] (https://www.hiann.org/ or 643-041-7271) offers psychoeducational information and services for individuals with a history of brain injury.  Additional local  resources include:  [place] [facility] (www.[place]eniorservices.org) provides information about adult day health care centers and community-based services and programs.  Ms. Cuba's family may benefit from information and resources available through the [facility] [place], which includes support groups and respite services (www.caregiver.org).    Presenting Concerns:     Cognitive Functioning: Ms. Cuba and her  reported she experienced significant language and memory difficulties following the SDH and SAH in August 2021. There were no reports of cognitive problems in Ms. Cuba before the brain injury. Her  explained that there was some improvement in Ms. Cuba's cognitive functioning within 6 months from the SDH and SAH, but she has not returned to her pre-injury baseline. Further, he stated that increased frustration and stress exacerbate her cognitive problems. Examples of Ms. Cuba's current concerns included misplacing items, repeating information, and forgetting recent conversations, events, and what she is saying in the middle of a sentence. Additionally, she reported reduced processing speed, concentration problems, word finding problems, and difficulty multitasking. Her  reported observing weekly episodes of confusion and spatial disorientation at home. He also noted Ms. Cuba has difficulty planning and organizing and it takes her longer to make decisions and problem solve. There were no reports of difficulties with auditory comprehension.    Functional Abilities: Ms. Cuba and her  reported she is independent in all self-care ADLs, though her physical problems interfere at times. Her  noted that he has been managing finances, medications, and appointments for Ms. Cardenass since the brain injury. She remains independent with household responsibilities and simple cooking (no safety concerns). Ms. Cuba has not driven since the  brain injury.      Emotional Functioning: Ms. Cuba reported frequent sadness, feeling down, and loss of interest in activities since the brain injury. She also noted difficulties with sleep maintenance and a low energy level during the day. She denied suicidal ideation. Her  agreed and reported observing social withdrawal and decreased appetite. Further, he noted observing increased irritability and frustration regarding her inability to engage in activities she used to perform before the injury. Ms. Cuba also reported increased anxiety and stress related to her health problems and recovery from the brain injury. There were no reports of traumatic-stress related symptoms, manic symptoms, acting out dreams, hallucinations, or delusions.      Sensory/Physical/Motor Changes: Ms. Cuba reported reduced sense of taste and smell since the brain injury and a mild decline in her vision. She denied hearing decline. She reported she is having migraines approximately once per week. She also noted episodes of dizziness and balance problems that “come and go.” As noted above, she has residual right-sided weakness from the brain injury. Her  reported Ms. Cuba fell off the bed while asleep and broke her right arm in March. He noted she had another fall in the bathroom within the last 3 months, without significant injury. Ms. Cuba reported chronic pain related to scoliosis and persistent right arm and shoulder pain (intensity: 9/10) since the fall in March. She currently manages pain with medications including morphine and oxycodone. She denied rigidity or tremors. Current home modifications include grab bars in the restroom and a shower chair. She also has a walker, but has not been using it.    Medical History: Per her medical record, it includes SDH, SAH, seizures, chronic migraine, hypothyroidism, pancreatitis, non-ST elevation myocardial infarction, sarcoidosis, interstitial lung disease,  lung emphysema, chronic respiratory failure, chronic obstructive pulmonary disease, ostium secundum type atrial septal defect (status post percutaneous closure), mild aortic regurgitation and aortic valve sclerosis, vitamin D deficiency (supplementing), hypercholesterolemia, hypertension, hyperglycemia, splenomegaly, cardiomegaly, chronic pain syndrome, allergic rhinitis, gastroesophageal reflux disease, small bowel obstruction, thrombocytopenia, aspiration pneumonia, hiatal hernia, incisional hernia, adrenal insufficiency, gastroenteritis, acute colitis, bronchitis, chronic fatigue and malaise, cirrhosis, stage 3 chronic kidney disease (CKD; GFR 30-59 ml/min), diabetes (resolved with weight loss), clostridium difficile infection (resolved), jaundice, closed fracture of right elbow with routine healing, and fracture of left foot. She reported she underwent a sleep study and was diagnosed with mild sleep apnea in the past, but this has remained untreated. Ms. Cuba and her  were unaware of her history of CKD in the medical record. She noted she underwent a liver transplant approximately 10 years ago and she has been using continuous oxygen daily since then. She also underwent bariatric surgery in 2015. She denied any complications following this surgery. Additional surgical history includes cataract surgery, cholecystectomy, upper GI endoscopy (gastroscopy), ventral hernia repair, bilateral turbinate reduction, nasal reconstruction, paniculectomy, colonoscopy, breast biopsy, lung biopsy, bone marrow aspiration, endo-bronchoscopy, sigmoidoscopy, hemorrhoidectomy, carpal tunnel release, total abdominal hysterectomy, breast reduction, and tonsillectomy. Ms. Cuba presented to [facility] emergency department with a severe headache, nausea, and dizziness on 05/01/2022. There were no acute neurologic findings or evidence of stroke. She was treated with intramuscular morphine and oral  Zofran with improvement of the nausea and near resolution of the headache. She was discharged home in stable condition. Hospitalizations within the past month were denied.    Mental Health History: Ms. Cuba reported she was diagnosed with general anxiety disorder and major depressive disorder by a psychiatrist approximately 3 to 4 years ago. She has been taking sertraline since then and her symptoms had improved before the brain injury. Per her medical record, she also has a history of insomnia. She reported she engaged in a few counseling sessions as part of the preparation for bariatric surgery in 2015. She denied any other history of counseling or psychiatric treatment, including hospitalizations.      Family Medical History: Remarkable for diabetes, hypertension, stroke, heart disease, hyperlipidemia, cancer, and substance use disorder. Known family history of dementia was denied.    Medications and Supplements: Levetiracetam, alprazolam, sertraline, ondansetron, galcanezumab-gnlm, calcium-vitamin D, metoclopramide, ambrisentan, oxycodone, lacosamide, levothyroxine, morphine, gabapentin, albuterol, potassium, furosemide, pravastatin, tadalafil, promethazine, mycophenolate, omeprazole, magnesium, vitamin C, and multivitamin.    Psychosocial History: Ms. Cuba was born in [place], [place], and raised in [place], [place]. She denied a known history of birth complications or early developmental delays. She earned a high school diploma, a bachelor's degree, and then a master's degree in business administration at the [employer]. She denied a history of learning or academic difficulties. 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 She retired following the liver transplant approximately 10 years ago. She currently lives with her  of 40 years in a private residence. They have 2  children and 3 grandchildren. She reported good social support from her family. She noted that she has not been engaging in hobbies or activities since the brain injury.    Substance Use: Ms. Cuba denied current use of alcohol and illicit or recreational drugs, including nicotine products. There is no reported history of substance use disorder or treatment.        Measures Administered: [place]ill Naming Test (BNT); Concepcion-Lugo Executive Functioning System (DKEFS): Color-Word Interference (CWI) & Verbal Fluency (VF); Executive Clock Drawing Test (CLOX); Generalized Anxiety Disorder 7 Item Scale (DUC-7); Ocampo Verbal Learning Test - Revised (HVLT-R); Mini-Mental State Examination - 2nd Ed. (MMSE-2); Neuropsychological Assessment Battery (NAB): Shape Learning (SL); Oral Trail Making Test A & B (O-TMT); Patient Health Questionnaire (PHQ-9); Repeatable Battery for the Assessment of Neuropsychological Status Line Orientation (RBANS LO); Symbol Digit Modalities Test (SDMT); Test of Premorbid Functioning (ToPF); Trail Making Test A & B (TMT); Wechsler Adult Intelligence Scale - 4th Ed. (WAIS-IV): Digit Span (DS), Matrix Reasoning (MR), Visual Puzzles (), Similarities (SI), Vocabulary (VC), & Information (IN); and Wechsler Memory Scale - 4th Ed. Logical Memory (WMS-IV LM). Informant Questionnaires: Activities of Daily Living Questionnaire (ADLQ); Frontal Systems Behavior Scale (FrSBe; Family Rating Form); and Neuropsychiatric Inventory Questionnaire (NPI-Q).     Behavioral Observations: Ms. Cuba arrived at the clinic on time and was accompanied by her , who participated in the clinical interview. Everybody wore facemasks given the COVID-19 pandemic. She used portable oxygen throughout the appointment. She was well groomed and dressed. She was somnolent and fatigued on the day of the interview. Therefore, the testing portion of the appointment was completed on a different day. She was oriented to situation,  person, and place, but incompletely oriented to time (MMSE-2 Orientation = 8/10; incorrect day of the week and date). She was polite and always maintained appropriate interpersonal boundaries. Rapport was easily established. Gait was slow and mildly unsteady. Her  aided her with ambulation. She reported significant pain in her right arm, which limited hand mobility and interfered with writing and drawing during the cognitive screening measure. She exhibited frequent word finding pauses and lost her train of thought at times during the interview. Comprehension was adequate for conversation and test instructions. Speech rate, volume, articulation, and prosody were intact. Speech content was appropriate to context. Mood was reportedly depressed. Affect was mood-congruent and appropriate to the situation. Insight into cognitive and emotional functioning was intact. There was no indication of hallucinations, delusions, or thought disorder. Vision and hearing were adequate for the evaluation. She was attentive throughout the evaluation and had no difficulties with retention of task demands. She perceived her performance as poor and made negative remarks, but responded well to encouragement and prompts from the examiner. There was no evidence of overt fatigue, frustration, impulsivity, or disinhibition. Overall, she was engaged and cooperative throughout testing.    Results & Key Findings: Please note that scores reported are for professional use only. For diagnostic purposes, a performance score that falls below the 9th percentile of the reference group for that measure may be considered a cognitive deficit depending on the overall pattern of performance. The following clinical descriptors identify performance within the range of percentile scores indicated in the parentheses: Exceptionally High Score (>98th), Above Average Score (91st-97th), High Average Score (75th-90th), Average Score (25th-74th), Low Average  Score (9th-24th), Below Average Score (3rd-8th), and Exceptionally Low Score (<2nd). Please see the attached test results summary table in the appendix for a list of measures administered as well as raw and normative scores, which are listed in the designated columns. All such scores are based on age-corrected norms and certain scores may be adjusted for education and/or other demographic factors as appropriate.     Data Validity: Ms. Cardenass performance on embedded validity indicators was within the valid range, suggesting she appropriately engaged in testing. The following test results are considered an accurate representation of her current level of cognitive functioning.    Premorbid/Estimated Intelligence: A predicted estimate of premorbid intellectual functioning based on age and her performance on a single word-reading test fell within the average range (TOPF). Based on demographic factors, her premorbid ability was estimated in the high average range (TOPF).    Cognitive Screening: On the MMSE-2, Ms. Cardenass raw score was 21/30, which falls in the exceptionally low range given her age and level of education. Points were lost for immediate word recall (-2), orientation (-2), short delay word recall (-1), and serial 7 subtractions (-4).     Attention/Working Memory: Overall performance on a measure of basic auditory attention span (forward number repetition) and working memory (backward/sequenced number repetition) was below average (WAIS-IV DS).     Processing Speed: Oral and graphomotor code transcription speed (SDMT), rapid color naming (DKEFS CWI), and visuomotor number sequencing speed (TMT A) were all exceptionally low. Oral number sequencing (O-TMT A) and simple word reading speed (DKEFS CWI) were both low average.      Language: Visual confrontation naming (BNT) and semantic verbal fluency (DKEFS VF) were both exceptionally low. General fund of knowledge was low average (WAIS-IV IN). Single  word reading was average (TOPF).    Visual Perception/Construction: Mental arrangement of puzzle pieces was below average (WAIS-IV ). Copy of a clock was low average (CLOX 2). Judgment of line orientation was average (RBANS LO). Copy of intersecting pentagons was intact (MMSE-2).     Memory: Total recall of a wordlist across 3 learning trials was exceptionally low. Delayed recall of the wordlist remained exceptionally low. Delayed recognition was also exceptionally low, with 7/12 target words correctly identified and one false positive error (HVLT-R). Immediate and delayed recall of short stories were low average and average, respectively. Delayed recognition of story details was low average (WMS LM). Total immediate recognition recall of a series of designs across 3 learning trials was exceptionally low, as was delayed recognition recall. Forced choice recognition discrimination was low average, with 8/9 target designs correctly identified and 3 false positive errors (NAB SL).     Executive Functioning: Visuomotor set shifting was discontinued due to timing out following multiple errors and difficulty maintaining task set. As such, performance on this task is considered below expectation (TMT B). Performance on a measure of response inhibition was below expectation as it was discontinued due to numerous errors during the practice trial. Given her difficulty with this task, response inhibition with a mental switching component was not administered (DKEFS CWI). Freehand clock drawing and conceptualization was exceptionally low. Points were lost for not anchoring the numbers initially, symmetry errors, a number omission, an intrusion error, and incorrect placement of the hour hand (CLOX 1). Phonemic verbal fluency (DKEFS VF) and semantic verbal set shifting (DKEFS VF) were both exceptionally low. Abstract/deductive visual reasoning was low average (WAIS-IV MR). Abstract verbal reasoning was average (WAIS-IV  SI).    Self-Report Questionnaires: Ms. Cuba's responses on self-report inventories of emotional functioning were indicative of moderately severe levels of depression (PHQ-9) and moderate levels of anxiety (DUC-7). She reported these symptoms make it “very difficult” to function in various settings.     Informant Questionnaires: Her  completed a questionnaire about Ms. Cardenass ability to function independently, implying a moderate level of impairment in activities of daily living, with particular difficulty in financial management, recreation, and transportation (ADLQ). On a questionnaire regarding neuropsychiatric symptoms, her  reported observing mild agitation, depression, motor disturbance, and nighttime behaviors coupled with moderate appetite/eating changes (NPI-Q). He completed a self-report questionnaire regarding his perception of observed frontal lobe mediated behaviors in daily life before and after Ms. Cardenass brain injury (FrSBe FR). Before the injury, he endorsed observing clinically significant elevations (T-score >65) in the areas of apathy (T = 68) and executive dysfunction (T = 78), resulting in an elevation in the total scale (T = 72). After the injury, he endorsed observing a clinically significant elevation on the disinhibition subscale (T = 82) and higher clinically significant elevations on the apathy (T = 85) and executive dysfunction subscales (T = 83), resulting in a higher elevation in the total scale (T = 89). This is indicative of significantly increased difficulties in these areas following the brain injury in 2021, with more prominent apathy and executive dysfunction symptoms.       Thank you for allowing me to participate in Ms. Cardenass care. If I can be of further assistance, please do not hesitate to contact me.      Leighton Chopra, Ph.D.          Clinical Neuropsychologist          Department of Neurology          [facility]        Appendix:            This report was created using voice recognition software. I have made every reasonable attempt to avoid dictation errors, but this document may contain an error not identified before finalizing. If the error changes the accuracy of the document, I would appreciate it being brought to my attention. Thank you.    Two hours and 15 minutes were spent interviewing Ms. Cuba and her  (05/04/2022; neurobehavioral status exam: 40724 = 1; 86917 = 1).Test administration and scoring were completed in 3 hours and 39 minutes (05/12/2022; 58340 = 1, 01494 = 6). Two hours and 43 minutes were spent in clinical decision-making, chart review, records reviews, interpretation of test results and clinical data, integration of patient data, and report preparation (05/12/2022 - 06/08/2022; test evaluation services: 43877 = 1, 00068 = 2).

## 2022-06-10 NOTE — PREPROCEDURE INSTRUCTIONS
"Preadmit appointment: \" Preparing for your Procedure information\" sheet given to patient with verbal and written instructions. Patient instructed to continue prescribed medications through the day before surgery, instructed to take the following medications the day of surgery per anesthesia protocol: Albuterol inhaler if needed and instructed to bring day of procedure, Ambrisentan, clonazepam, Fludrocortisone, Lacosamide, Levetiracetam, levothyroxine metoclopramide, morphine, mycophenolate, omeprazole, ondansetron or promethazine if needed, oxycodone, pravastatin, tacrolimus  Verbal and written instructions on covid symptoms to watch for given to patient and patient instructed to call MD if any symptoms develop prior to surgery/procedure. Pt reports with anesthesia it usually takes more medication, advised to discuss with anesthesia day of surgery. Mets <4.   with patient for interview, pt has difficulty with long and short term memory,  helped with history. Pt having some confusion with history and would get frustrated and cry. Dr Navarrete emailed with admit, health history, recent seizure on 5/29/2022.      Good afternoon,    Patient should take the medications as you have instructed. I’m a little bit confused as the pulmonary hypertension issue seems to have resolved but patient is on ongoing treatment as well post hepatic transplant. Regardless, since we are some days away from this procedure, patient should get a medical clearance given the complex medical history that is available to us currently. Thank you.     Tino Navarrete M.D.  [facility]    The above Dr. Navarrete request faxed to Dr. John along with labs with attention to abnormal WBC, unable to call request and labs, office closed, will follow up and call on Monday when open  "

## 2022-06-14 NOTE — TELEPHONE ENCOUNTER
Medical clearance- [facility] regarding  Clearance for pt's procedure on 6/21. I did leave a voicemail with our direct fax number for request.

## 2022-06-21 NOTE — PROCEDURES
Procedure Performed: EGD and Colonoscopy     Indication for Procedure: Rectal bleed, periumbilical pain, nausea and nonbloody emesis     Procedure Date: 6/21/2022    Performing Physician: Neelam Castellanos D.O.     Informed Consent: Before the procedure was performed, risks, benefits, and complications of the procedure were explained in layman's terms to the patient and understood by the patient. The risks included but were not limited to the risks of anesthesia/sedation, bleeding, pneumothorax, perforation, adverse reaction to medication, acute respiratory failure and possible death. The patient fully understood the risks and benefits and requested the procedure be performed.       Consent Obtained: From the patient.           Anesthesia: General anesthesia by Dr. Javier. Please see anesthesia records.     Findings:   EGD  1. Esophagus: Visualized portion of the esophagus appeared healthy.  2.  Gastric pouch:   -Postoperative changes consistent with Evon-en-Y gastric bypass.  -Visualized portion of the gastric pouch appeared healthy.  -Gastrojejunal anastomosis appeared normal with staples from prior gastric bypass surgery present.  3.  Jejunum:  -Afferent limb was traversed up to 85 cm which appeared healthy.  -Efferent limb was traversed which appeared healthy.    Colonoscopy  1. Bowel prep: Suboptimal prep in certain parts of the colon such as a sending and descending.  2.  Scattered small mouth diverticuli throughout the colon.  3.  Redundant and tortuous colon, requiring abdominal pressure.  4.  Grade I internal hemorrhoids.  5.  Nonthrombosed external hemorrhoids.    Procedure Summary: Timeout was performed. The patient was put in the left lateral decubitus position. An Olympus adult gastroscope was introduced through the mouth and advanced until the 2nd part of duodenum. The scope was then drawn back into the stomach.  Retroflexion was done to visualize the fundus and cardia of the stomach. The gastroscope was  then withdrawn while carefully inspecting the esophageal mucosa.     The patient's gurney was turned 180 degrees. A digital rectal exam was done. An Olympus pediatric colonoscope was introduced into the anal orifice and advanced all the way until the cecum, identified by the tri-radiate fold, appendiceal orifice and ileocecal valve. The colonoscope was then withdrawn while doing a careful, circumferential examination of the colonic mucosa. Continuous monitoring of pulse oximetry, heart rhythm, and blood pressure was done before, during and after the procedure. The patient left the endoscopy lab in stable condition.    Estimated Blood Loss, if any: None     Unusual Events or Complications: None     Specimen(s), if any: None     Disposition:   1.  Unremarkable exam.  2.  Repeat colonoscopy in 5 years for colon cancer screening due to suboptimal prep in certain parts of the colon.  3.  Consider video capsule endoscopy but given patient's history of multiple abdominal surgeries and SBO, she will need a ghost capsule first.  4.  Follow-up with Dr. Matt as an outpatient.  5.  Resume diet as tolerated.  6.  Discussed with the patient and her . All questions and concerns addressed.      Addendum:  Ventral hernia was noted on exam which was tender to palpation.  Further imaging is recommended.  Discussed with the patient's primary gastroenterologist.

## 2022-06-21 NOTE — ANESTHESIA PROCEDURE NOTES
Peripheral IV    Date/Time: 6/21/2022 8:45 AM  Performed by: Justino Javier M.D.  Authorized by: Justino Javier M.D.     Size:  20 G  Laterality:  Left  Site Prep:  Alcohol  Technique:  Ultrasound guided  Attempts:  1  Difficult IV necessitating physician skill: IV access difficult    Ultrasound Guidance: Yes

## 2022-06-21 NOTE — ANESTHESIA POSTPROCEDURE EVALUATION
Patient: Roxana Cuba    Procedure Summary     Date: 06/21/22 Room / Location:  ENDOSCOPIC ULTRASOUND ROOM / SURGERY [facility]    Anesthesia Start: 0911 Anesthesia Stop: 1000    Procedures:       COLONOSCOPY (N/A )      GASTROSCOPY (N/A ) Diagnosis:       History of Evon-en-Y gastric bypass      Diverticulosis      (History of Evon-en-Y gastric bypass [[id number]])    Surgeons: Neelam Castellanos D.O. Responsible Provider: Justino Javier M.D.    Anesthesia Type: general ASA Status: 3          Final Anesthesia Type: general  Last vitals  BP   Blood Pressure: 119/62    Temp   36.4 °C (97.5 °F)    Pulse   68   Resp   18    SpO2   95 %      Anesthesia Post Evaluation    Patient location during evaluation: PACU  Patient participation: complete - patient participated  Level of consciousness: awake and alert    Airway patency: patent  Anesthetic complications: no  Cardiovascular status: hemodynamically stable  Respiratory status: acceptable  Hydration status: euvolemic    PONV: none          No complications documented.     Nurse Pain Score: 5 (NPRS)

## 2022-06-21 NOTE — DISCHARGE INSTRUCTIONS
ENDOSCOPY HOME CARE INSTRUCTIONS    GASTROSCOPY OR ERCP  1. Don't eat or drink anything for about an hour after the test. You can then resume your regular diet.  2. Don't drive or drink alcohol for 24 hours. The medication you received will make you too drowsy.  3. Don't take any coffee, tea, or aspirin products until after you see your doctor. These can harm the lining of your stomach.  4. If you begin to vomit bloody material, or develop black or bloody stools, call your doctor as soon as possible.  5. If you have any neck, chest, abdominal pain or temp of 100 degrees, call your doctor.  6. See your doctor; call to make a follow-up with [facility]    COLONOSCOPY OR FLEXIBLE SIGMOIDOSCOPY  1. If you received a barium enema, take a mild laxative such as dulcolax, Katelynn's M.O., or Milk of Magnesia to clean out the barium.  2. Drink plenty of fluids. Eat a diet high in fiber (whole-grain breads, fresh fruit and vegetables).  3. You may notice a few drops of blood with your first bowel movement. If you develop any large amount of bleeding, black stools, a fever, or abdominal pain, call your doctor right away.  4. Call your doctor for test results in 2 week(s). If you have not heard from your doctor by that time.  5. Don't drive or drink alcohol for 24 hours. The medication you received will make you too drowsy.  6. No heavy lifting, no Aspirin products or Aspirin for 5 days       You should call 911 if you develop problems with breathing or chest pain.  If any questions arise, call your doctor. If your doctor is not available, please feel free to call [phone]. You can also call the HEALTH HOTLINE open 24 hours/day, 7 days/week and speak to a nurse at [phone], or toll free [phone].    Depression / Suicide Risk    As you are discharged from this [facility] facility, it is important to learn how to keep safe from harming yourself.    Recognize the warning signs:  Abrupt changes in  personality, positive or negative- including increase in energy   Giving away possessions  Change in eating patterns- significant weight changes-  positive or negative  Change in sleeping patterns- unable to sleep or sleeping all the time   Unwillingness or inability to communicate  Depression  Unusual sadness, discouragement and loneliness  Talk of wanting to die  Neglect of personal appearance   Rebelliousness- reckless behavior  Withdrawal from people/activities they love  Confusion- inability to concentrate     If you or a loved one observes any of these behaviors or has concerns about self-harm, here's what you can do:  Talk about it- your feelings and reasons for harming yourself  Remove any means that you might use to hurt yourself (examples: pills, rope, extension cords, firearm)  Get professional help from the community (Mental Health, Substance Abuse, psychological counseling)  Do not be alone:Call your Safe Contact- someone whom you trust who will be there for you.  Call your local CRISIS HOTLINE 062-0691 or 321-626-0227  Call your local Children's Mobile Crisis Response Team [place] (392) 757-6279 or www.Meican  Call the toll free National Suicide Prevention Hotlines   National Suicide Prevention Lifeline 783-345-LEJP (9146)  National Hope Line Network 800-SUICIDE (601-4904)    I acknowledge receipt and understanding of these Home Care Instructions.

## 2022-06-21 NOTE — ANESTHESIA TIME REPORT
Anesthesia Start and Stop Event Times     Date Time Event    6/21/2022 0725 Ready for Procedure     0911 Anesthesia Start     1000 Anesthesia Stop        Responsible Staff  06/21/22    Name Role Begin End    Justino Javier M.D. Anesth 0911 1000        Overtime Reason:  no overtime (within assigned shift)    Comments:

## 2022-06-21 NOTE — OR NURSING
"COLONOSCOPY  Neelam Castellanos D.O.  Justino Javier M.D.  -------------------------------------------------------  1032: Pt arrived to stage 2 via gurney from PACU.  Pt assisted with getting dressed.  Her personal O2 tank at chairside.  Report rec'd that patient is on 4L O2 at home. Pt is tearful and in fear of going home as she stated, \"It's just the 2 of us.\" Spent time with patient, reassurance and emotional support given.      1045:   of pt at chairside. Pt drinking juice.      1105: Assisted pt to the restroom.  PIV DC'd, tony applied.  D/Alfredo to care of spouse post uneventful stay in PACU 2.       "

## 2022-06-21 NOTE — OR NURSING
0905 To PACU from [facility] by isha lucia. Pt sleeping, respirations spontaneous and nonlabored.    1005 pt awake given Po fluids. Pt c/o back pain, which is chronic per pt. Relaxation technique encouraged, pt repositioned and given warm blankets for comfort.     1015 VSS, pt states pain at tolerable level.     1020 pt meets criteria to transfer to stage 2

## 2022-06-21 NOTE — ANESTHESIA PREPROCEDURE EVALUATION
Case: [id number] Date/Time: 06/21/22 0715    Procedures:       COLONOSCOPY (N/A )      GASTROSCOPY (N/A )    Anesthesia type: MAC    Pre-op diagnosis: NAUSEA/VOMITING, ABNORMAL STOOLS, AND RECTAL BLEEDING    Location:  ENDOSCOPIC ULTRASOUND ROOM / SURGERY [facility]    Surgeons: Neelam Castellanos D.O.          Relevant Problems   PULMONARY   (positive) History of Clostridium difficile infection   (positive) History of infection with vancomycin resistant Enterococcus (VRE)      NEURO   (positive) Chronic migraine without aura, intractable, without status migrainosus   (positive) H/O non-ST elevation myocardial infarction (NSTEMI)   (positive) History of Clostridium difficile infection   (positive) History of aspiration pneumonia   (positive) History of infection with vancomycin resistant Enterococcus (VRE)   (positive) History of pancreatitis   (positive) History of pneumonia   (positive) History of small bowel obstruction   (positive) Seizure (HCC)   (positive) Seizures (HCC)      CARDIAC   (positive) Chronic migraine without aura, intractable, without status migrainosus   (positive) Hepatopulmonary syndrome (HCC)   (positive) Mild aortic regurgitation and aortic valve sclerosis   (positive) Mild mitral regurgitation   (positive) Primary pulmonary hypertension (HCC)      GI   (positive) GERD (gastroesophageal reflux disease)   (positive) Hiatal hernia         (positive) Hepatopulmonary syndrome (HCC)      ENDO   (positive) Hypothyroidism      Other   (positive) Splenic lesion   (positive) Splenomegaly       Physical Exam    Airway   Mallampati: II  TM distance: >3 FB  Neck ROM: full       Cardiovascular - normal exam  Rhythm: regular  Rate: normal  (-) murmur     Dental - normal exam           Pulmonary - normal exam  Breath sounds clear to auscultation     Abdominal    Neurological - normal exam                 Anesthesia Plan    ASA 3 (hx liver transplant)   ASA physical status 3 criteria: COPD and other  (comment)    Plan - general       Airway plan will be natural airway          Induction: intravenous    Postoperative Plan: Postoperative administration of opioids is intended.    Pertinent diagnostic labs and testing reviewed    Informed Consent:    Anesthetic plan and risks discussed with patient.    Use of blood products discussed with: patient whom consented to blood products.

## 2022-06-30 NOTE — TELEPHONE ENCOUNTER
Received request via: Pharmacy  6/6/2022lov  Was the patient seen in the last year in this department? Yes    Does the patient have an active prescription (recently filled or refills available) for medication(s) requested? No

## 2022-06-30 NOTE — TELEPHONE ENCOUNTER
Received request via: Pharmacy    Was the patient seen in the last year in this department? Yes  6/6/22  Does the patient have an active prescription (recently filled or refills available) for medication(s) requested? No

## 2022-06-30 NOTE — TELEPHONE ENCOUNTER
ESTABLISHED PATIENT PRE-VISIT PLANNING     Patient was NOT contacted to complete PVP.     Note: Patient will not be contacted if there is no indication to call.     1.  Reviewed notes from the last few office visits within the medical group: Yes    2.  If any orders were placed at last visit or intended to be done for this visit (i.e. 6 mos follow-up), do we have Results/Consult Notes?         •  Labs - Labs were not ordered at last office visit.  Note: If patient appointment is for lab review and patient did not complete labs, check with provider if OK to reschedule patient until labs completed.       •  Imaging - Imaging was not ordered at last office visit.       •  Referrals - No referrals were ordered at last office visit.    3. Is this appointment scheduled as a Hospital Follow-Up? Yes, visit was at [facility].     4.  Immunizations were updated in Epic using Reconcile Outside Information activity? Yes    5.  Patient is due for the following Health Maintenance Topics:   Health Maintenance Due   Topic Date Due   • Annual Wellness Visit  Never done   • IMM ZOSTER VACCINES (2 of 2) 01/29/2019   • COVID-19 Vaccine (4 - Booster for Moderna series) 02/09/2022         6.  AHA (Pulse8) form printed for Provider? No, already completed

## 2022-07-01 NOTE — TELEPHONE ENCOUNTER
Med due for refill? yes  Last OV: 4/13/22  Next OV: 7/15/22  Provider to Refill: RO    Plan requires a 100 day supply.  Thank you

## 2022-07-07 NOTE — PROGRESS NOTES
Subjective:     Chief Complaint   Patient presents with   • Follow-Up     1 month         HPI:   Roxana presents today for follow up.   Follow up  With Dr Matt 7/21/22.     More lower abdominal pain since colonoscopy and gastroscopy. Done by Dr Castellanos, followng with Dr Matt.   Ventral hernia noted on exam. Tender. This is likely tenderness culprit.   Present for a couple years, getting bigger. Prior Gastric bypass at [facility].  She would like to have this surgically repaired.  She think this is a big reason why she is unable to eat very much.  She has lost a little bit of weight again since we saw her last but she is also had a colonoscopy and had a bowel prep in the interim.        Current Outpatient Medications Ordered in Epic   Medication Sig Dispense Refill   • pravastatin (PRAVACHOL) 20 MG Tab Take 1 Tablet by mouth every day. 100 Tablet 3   • Galcanezumab-gnlm (EMGALITY) 120 MG/ML Solution Auto-injector Inject 1 PEN under the skin every 4 weeks. 1 Each 5   • lacosamide (VIMPAT) 100 MG Tab tablet Take 1 Tablet by mouth 2 times a day for 180 days. 60 Tablet 5   • levetiracetam (KEPPRA) 750 MG tablet TAKE 2 TABLETS BY ENTERAL TUBE ROUTE 2 TIMES A DAY FOR 30 DAYS. 360 Tablet 1   • morphine ER (MS CONTIN) 15 MG Tab CR tablet Take 1 Tablet by mouth every 12 hours for 30 days. 60 Tablet 0   • oxyCODONE immediate release (ROXICODONE) 10 MG immediate release tablet Take 1 Tablet by mouth 2 (two) times a day for 30 days. Take 1 tablet by mouth every 6-8 for moderate to severe pain 60 Tablet 0   • Multiple Vitamin (MULTIVITAMIN ADULT PO) Take  by mouth every day.     • ondansetron (ZOFRAN ODT) 4 MG TABLET DISPERSIBLE Take 2 Tablets by mouth every 8 hours as needed for Nausea. 90 Tablet 0   • saccharomyces boulardii (FLORASTOR) 250 MG Cap Take 1 Capsule by mouth 3 times a day with meals. 90 Capsule 3   • promethazine (PHENERGAN) 25 MG Suppos Insert 1 Suppository into the rectum every 6 hours as needed for Nausea/Vomiting. 10  Suppository 0   • potassium chloride SA (KDUR) 20 MEQ Tab CR Take 1 Tablet by mouth every day. 90 Tablet 3   • tacrolimus (PROGRAF) 1 MG Cap TAKE 4 CAPSULES BY MOUTH EVERY DAY FOR 30 DAYS (Patient taking differently: Take 2 mg by mouth 2 times a day.) 360 Capsule 2   • omeprazole (PRILOSEC) 40 MG delayed-release capsule Take 1 Capsule by mouth every day. 90 Capsule 3   • sertraline (ZOLOFT) 100 MG Tab Take 1.5 Tablets by mouth every day. (Patient taking differently: Take 150 mg by mouth every evening.) 135 Tablet 3   • fludrocortisone (FLORINEF) 0.1 MG Tab Take 1 Tablet by mouth every day for 90 days. Indications: Blood Pressure Drop Upon Standing 90 Tablet 2   • tadalafil (CIALIS) 20 MG tablet Take 1 Tablet by mouth every day. (Patient taking differently: Take 20 mg by mouth every evening.) 90 Tablet 3   • oxybutynin SR (DITROPAN-XL) 10 MG CR tablet Take 1 Tablet by mouth every day. (Patient not taking: Reported on 6/10/2022) 30 Tablet 0   • metoclopramide (REGLAN) 10 MG Tab Take 10 mg by mouth 2 times a day.     • CALCIUM-VITAMIN D PO Take 1 Tablet by mouth every evening.     • magnesium oxide (MAG-OX) 400 MG Tab tablet Take 400 mg by mouth every day.     • levothyroxine (SYNTHROID) 137 MCG Tab Take 1 Tablet by mouth every morning on an empty stomach. Indications: Underactive Thyroid 90 Tablet 3   • Ascorbic Acid (VITAMIN C) 500 MG Chew Tab Chew 500 mg every day. Indications: Inadequate Vitamin C     • albuterol (PROAIR HFA) 108 (90 Base) MCG/ACT Aero Soln inhalation aerosol Inhale 2 Puffs every four hours as needed for Shortness of Breath (wheezing). 8.5 g 3   • furosemide (LASIX) 20 MG Tab Take 1 Tablet by mouth every day. Indications: Edema 30 Tablet 0   • ambrisentan (LETAIRIS) 10 MG tablet TAKE 1 TABLET BY MOUTH EVERY DAY 30 Tablet 11   • mycophenolate (CELLCEPT) 250 MG Cap Take 250 mg by mouth 2 times a day. Indications: Liver Transplant Recipient       No current Epic-ordered facility-administered  "medications on file.         ROS:  Gen: no fevers/chills, no changes in weight  Eyes: no changes in vision  ENT: no sore throat, no hearing loss, no bloody nose  Pulm: no sob, no cough  CV: no chest pain, no palpitations  GI: no nausea/vomiting, no diarrhea  : no dysuria  MSk: no myalgias  Skin: no rash  Neuro: no headaches, no numbness/tingling  Heme/Lymph: no easy bruising      Objective:     Exam:  BP (!) 98/50 (BP Location: Left arm, Patient Position: Sitting, BP Cuff Size: Adult)   Pulse 87   Temp 36.7 °C (98.1 °F)   Resp 12   Ht 1.753 m (5' 9\")   Wt 54 kg (119 lb)   LMP 01/03/2000   SpO2 96%   BMI 17.57 kg/m²  Body mass index is 17.57 kg/m².    Gen: AAOx3, NAD, well appearing  HEENT: NCAT, EOMI, Nares patent, Mucosa moist  Resp: Normal chest wall rise and fall, not SOB, no tachypnea  Skin: no rash or abnormality of visible skin.   Psych: normal speech, not slurred, good insight, affect full  MSK: Moves all four limbs equally and normally, gait normal  Abdomen: Large ventral hernia is present.  Bowel sounds are positive at the herniated area.  This is tender to palpate.  Does not appear to be reducible.      Assessment & Plan:     62 y.o. female with the following -     1. Ventral hernia without obstruction or gangrene    - Referral to General Surgery  Discussed referral to surgery to discuss possible ventral hernia repair.  She is not necessarily the greatest anesthesia or surgical candidate but I do think this is probably weighing heavily on her from a mental and physical standpoint and so might be worth repairing.      2.  Chronic pain syndrome  This does complicate her overall picture.  There is some issues possibly with her pain management and medications and also her mental health.  She is very anxious in general which tends to color her overall health picture.  Continue to work on medications and helping her understand the limits that she will have given her chronic health conditions.  The " biggest thing for her right now is trying to get some nutrition in.  We did discuss different options for making shakes at home to try to get some liquid calories at least.  I do not want her drinking any low calorie or sugar-free items right now because I want to get some weight on her.  She will follow-up with us after she sees GI and let us know if they need anything else in the interim.    No follow-ups on file.    Please note that this dictation was created using voice recognition software. I have made every reasonable attempt to correct obvious errors, but I expect that there are errors of grammar and possibly content that I did not discover before finalizing the note.

## 2022-07-13 NOTE — ASSESSMENT & PLAN NOTE
CXR showed L basilar opacity likely atelectasis. IS.   Home oxygen arrangement    Epidermal Autograft Text: The defect edges were debeveled with a #15 scalpel blade.  Given the location of the defect, shape of the defect and the proximity to free margins an epidermal autograft was deemed most appropriate.  Using a sterile surgical marker, the primary defect shape was transferred to the donor site. The epidermal graft was then harvested.  The skin graft was then placed in the primary defect and oriented appropriately.

## 2022-07-14 NOTE — TELEPHONE ENCOUNTER
Incoming FAX: [facility]    RE: Pt needing to be re-qualified, an updated order and qualifying saturation results.     Placed noted in pt's upcoming appt and sent a fax notifying PHC of this.

## 2022-07-15 NOTE — PROGRESS NOTES
"Chief Complaint   Patient presents with   • Hyperlipidemia     F/B DX: Pure hypercholesterolemia        Subjective     Roxana Cuba is a 62 y.o. female who presents today A couple months ago during hospitalization was normal.as a follow-up.      Since she was last seen she continues to lose weight.  She has abdominal pain of unknown etiology.  She has been continuing on the same dose of tacrolimus for many years.  When I for started seeing her her weight was approximately 205 she is now down to 121 with a BMI of 17.5.  Her tacrolimus levels last time they were checked were undetectably low.  It is unclear whether not her abdominal pain and weight loss have been referred back to her hepatologist at [facility].  Her blood pressure is running low and she has had a few episodes where she is hit her head though she cannot necessarily endorse conor syncope.  She is on Letairis and tadalafil.  Her blood pressure is low today but though she denies symptoms.    Past Medical History:   Diagnosis Date   • Anemia    • Anesthesia     \"takes more to get me under, would rather be intubated\"    • Breath shortness     cont oxygen 5L (Preffered)   • Bronchitis      2016   • Cardiomegaly    • Cataract     bilateral IOL   • Chronic fatigue and malaise    • Chronic obstructive pulmonary disease (HCC)    • Cirrhosis (HCC) 12/2011    Status post liver transplant at [facility] [place]   • CKD (chronic kidney disease) stage 3, GFR 30-59 ml/min (HCC)    • Diabetes (HCC)     reports hx of, resolved w/weight loss. 6/10/22 pt reports recently told she has diabetes   • Emphysema of lung (HCC)    • Fracture of left foot    • GERD (gastroesophageal reflux disease)         • H/O Clostridium difficile infection 02/17/2017    reports \"16 months ago\". Current stool sample 01-26-17 neg   • Heart burn    • Hemorrhagic disorder (HCC)     \"low platelets\" bruises easily    • Hiatus hernia syndrome    • High cholesterol    • Hypertension    • Hypothyroid    • Indigestion  "   • Jaundice 2009   • Liver transplanted (HCC)    • Low back pain 02/17/2017    and left foot, 7/10   • Memory difficulty     short and long term since head injury 8/2021   • Mild aortic regurgitation and aortic valve sclerosis     • On home oxygen therapy     5 liters, Dr. Gaming   • Platelet disorder (HCC)     low platelets   • Pneumonia     aspiration,    • Psychiatric disorder     Mood disorder   • Pulmonary hypertension ([facility])        • S/P cholecystectomy    • Seizure (HCC) 05/29/2022    since head injury8/ 2021   • Shoulder pain     right   • Small bowel obstruction (HCC)     01-   • Splenomegaly    • VRE (vancomycin-resistant Enterococci)     02-17-17, pt declines knowledge of this     Past Surgical History:   Procedure Laterality Date   • IL COLONOSCOPY,DIAGNOSTIC N/A 6/21/2022    Procedure: COLONOSCOPY;  Surgeon: Neelam Castellanos D.O.;  Location: [facility];  Service: Gastroenterology   • IL UPPER GI ENDOSCOPY,DIAGNOSIS N/A 6/21/2022    Procedure: GASTROSCOPY;  Surgeon: Neelam Castellanos D.O.;  Location: SURGERY [facility];  Service: Gastroenterology   • IL UPPER GI ENDOSCOPY,DIAGNOSIS N/A 02/03/2022    Procedure: GASTROSCOPY;  Surgeon: Aravind Richards M.D.;  Location: SURGERY SAME DAY [facility];  Service: Gastroenterology   • CRANIOTOMY Left 08/04/2021    Procedure: CRANIOTOMY;  Surgeon: Tramaine Arnold III, M.D.;  Location: [facility];  Service: Neurosurgery   • VENTRAL HERNIA REPAIR ROBOTIC XI N/A 11/12/2018    Procedure: VENTRAL HERNIA REPAIR ROBOTIC XI- FOR EPIGASTRIC INCISIONAL;  Surgeon: John H Ganser, M.D.;  Location: [facility] ORS;  Service: General   • TURBINATE REDUCTION Bilateral 10/04/2018    Procedure: TURBINATE REDUCTION;  Surgeon: Silviano Erazo M.D.;  Location: SURGERY SAME DAY [facility]S;  Service: Ent   • NASAL RECONSTRUCTION Bilateral 10/04/2018    Procedure: NASAL RECONSTRUCTION- LATERA IMPLANTS;  Surgeon: Silviano Erazo M.D.;  Location:  SURGERY SAME DAY [facility];  Service: Ent   • GASTRIC BYPASS LAPAROSCOPIC  2018   • PANNICULECTOMY  12/11/2017    paniculectomy   • COLONOSCOPY N/A 03/27/2017    Procedure: COLONOSCOPY;  Surgeon: Osman Matt M.D.;  Location: SURGERY SAME DAY [facility];  Service:    • GASTROSCOPY N/A 03/27/2017    Procedure: GASTROSCOPY;  Surgeon: Osman Matt M.D.;  Location: SURGERY SAME DAY [facility];  Service:    • BREAST BIOPSY Left 02/21/2017    Procedure: BREAST BIOPSY - WIRE LOCALIZED EXCISONAL ;  Surgeon: Jane Zhao M.D.;  Location: SURGERY SAME DAY [facility];  Service:    • LUNG BIOPSY OPEN  11/2016   • OTHER ABDOMINAL SURGERY  12/2015    Gastric Sleeve   • BONE MARROW ASPIRATION  03/16/2015    Performed by Marlena Hi M.D. at ENDOSCOPY [facility]   • BONE MARROW BIOPSY, NDL/TROCAR  03/16/2015    Performed by Marlena Hi M.D. at ENDOSCOPY [facility]   • RECOVERY  03/31/2014    Performed by Ir-Recovery Surgery at SURGERY [facility]   • RECOVERY  03/24/2014    Performed by Cath-Recovery Surgery at SURGERY SAME DAY [facility]   • RECOVERY  01/21/2014    Performed by Ir-Recovery Surgery at SURGERY SAME DAY [facility]   • BRONCHOSCOPY-ENDO  11/15/2013    Performed by Ha Perez M.D. at ENDOSCOPY [facility]   • RECOVERY  02/27/2013    Performed by Ir-Recovery Surgery at SURGERY SAME DAY [facility]   • BONE MARROW ASPIRATION  12/31/2012    Performed by Josemanuel Real M.D. at ENDOSCOPY [facility]   • BONE MARROW BIOPSY, NDL/TROCAR  12/31/2012    Performed by Josemanuel Real M.D. at ENDOSCOPY [facility]   • GASTROSCOPY  09/28/2012    Performed by Aravind Richards M.D. at SURGERY [facility]   • SIGMOIDOSCOPY FLEX  09/26/2012    Performed by Kristopher Cardoso M.D. at [facility]   • BRONCHOSCOPY-ENDO  06/19/2012    Performed by MARLENA MURILLO at [facility]   • BRONCHOSCOPY-ENDO  05/29/2012    Performed by SUYAPA CAMARENA  P at ENDOSCOPY [facility]   • OTHER ABDOMINAL SURGERY  12/2011    [place] [facility] [place].   • GASTROSCOPY-ENDO  03/12/2010    Performed by CAMELIA SAMANO at ENDOSCOPY [facility]   • EXAM UNDER ANESTHESIA  11/11/2009    Performed by BIRD ABDI at SURGERY [facility]   • HEMORRHOIDECTOMY  11/11/2009    Performed by BIRD ABDI at SURGERY [facility]   • KELBY BY LAPAROSCOPY  09/19/2009    Performed by BIRD ABDI at SURGERY [facility]   • CARPAL TUNNEL RELEASE          • KELBY BY LAPAROSCOPY     • HERNIA REPAIR      x3   • HYSTERECTOMY, TOTAL ABDOMINAL          • MAMMOPLASTY REDUCTION     • OTHER SURGICAL PROCEDURE      liver transplant   • TN ANESTH,NOSE,SINUS SURGERY      x4   • TONSILLECTOMY       Family History   Problem Relation Age of Onset   • Other Father         Unknown (dead 10 years)   • Diabetes Father    • Heart Disease Father    • Hypertension Father    • Hyperlipidemia Father    • Stroke Father    • Non-contributory Mother    • Hyperlipidemia Mother    • Alcohol/Drug Mother    • Cancer Paternal Aunt    • Alcohol/Drug Maternal Grandmother    • Alcohol/Drug Maternal Grandfather      Social History     Socioeconomic History   • Marital status:      Spouse name: Not on file   • Number of children: Not on file   • Years of education: Not on file   • Highest education level: Not on file   Occupational History   • Not on file   Tobacco Use   • Smoking status: Never Smoker   • Smokeless tobacco: Never Used   • Tobacco comment: avoid all tobacco products   Vaping Use   • Vaping Use: Never used   Substance and Sexual Activity   • Alcohol use: Not Currently     Alcohol/week: 0.0 oz   • Drug use: No   • Sexual activity: Not on file   Other Topics Concern   • Primary/coprimary nurse & associates Not Asked   • Family contact information Not Asked   • OK to release patient information to the following Not Asked   • Patient preferred routine/Privacy concerns  Not Asked   • Patient likes and dislikes Not Asked   • Participating In Research Study Not Asked   • Miscellaneous Not Asked   Social History Narrative   • Not on file     Social Determinants of Health     Financial Resource Strain: Not on file   Food Insecurity: Not on file   Transportation Needs: Not on file   Physical Activity: Not on file   Stress: Not on file   Social Connections: Not on file   Intimate Partner Violence: Not on file   Housing Stability: Not on file     Allergies   Allergen Reactions   • Nsaids Unspecified     Can not take due to hx of liver transplant    • Rhubarb Anaphylaxis   • Organic Nitrates Unspecified     Nitroglycerin products should be avoided with the use of PDE-5 inhibitors as the combination can result in severe hypotension.  RD clarified with pt: Nitrates in food are okay and pt does not want nitrates to be listed as food allergy. Pt only avoids medications with nitrates.    • Other Drug      Any medication that may interact with cyclosporine, tacrolimus, sirolimus, or prograf (due to hx of liver transplant)    • Tylenol      Liver transplant   • Vancomycin      Red man syndrome     Outpatient Encounter Medications as of 7/15/2022   Medication Sig Dispense Refill   • Galcanezumab-gnlm (EMGALITY) 120 MG/ML Solution Auto-injector Inject 1 PEN under the skin every 4 weeks. 1 Each 5   • lacosamide (VIMPAT) 100 MG Tab tablet Take 1 Tablet by mouth 2 times a day for 180 days. 60 Tablet 5   • pravastatin (PRAVACHOL) 20 MG Tab Take 1 Tablet by mouth every day. 100 Tablet 3   • levetiracetam (KEPPRA) 750 MG tablet TAKE 2 TABLETS BY ENTERAL TUBE ROUTE 2 TIMES A DAY FOR 30 DAYS. 360 Tablet 1   • morphine ER (MS CONTIN) 15 MG Tab CR tablet Take 1 Tablet by mouth every 12 hours for 30 days. 60 Tablet 0   • oxyCODONE immediate release (ROXICODONE) 10 MG immediate release tablet Take 1 Tablet by mouth 2 (two) times a day for 30 days. Take 1 tablet by mouth every 6-8 for moderate to severe pain 60  Tablet 0   • Multiple Vitamin (MULTIVITAMIN ADULT PO) Take  by mouth every day.     • ondansetron (ZOFRAN ODT) 4 MG TABLET DISPERSIBLE Take 2 Tablets by mouth every 8 hours as needed for Nausea. 90 Tablet 0   • promethazine (PHENERGAN) 25 MG Suppos Insert 1 Suppository into the rectum every 6 hours as needed for Nausea/Vomiting. 10 Suppository 0   • potassium chloride SA (KDUR) 20 MEQ Tab CR Take 1 Tablet by mouth every day. 90 Tablet 3   • tacrolimus (PROGRAF) 1 MG Cap TAKE 4 CAPSULES BY MOUTH EVERY DAY FOR 30 DAYS (Patient taking differently: Take 2 mg by mouth 2 times a day.) 360 Capsule 2   • omeprazole (PRILOSEC) 40 MG delayed-release capsule Take 1 Capsule by mouth every day. 90 Capsule 3   • sertraline (ZOLOFT) 100 MG Tab Take 1.5 Tablets by mouth every day. (Patient taking differently: Take 150 mg by mouth every evening.) 135 Tablet 3   • tadalafil (CIALIS) 20 MG tablet Take 1 Tablet by mouth every day. (Patient taking differently: Take 20 mg by mouth every evening.) 90 Tablet 3   • metoclopramide (REGLAN) 10 MG Tab Take 10 mg by mouth 2 times a day.     • CALCIUM-VITAMIN D PO Take 1 Tablet by mouth every evening.     • magnesium oxide (MAG-OX) 400 MG Tab tablet Take 400 mg by mouth every day.     • levothyroxine (SYNTHROID) 137 MCG Tab Take 1 Tablet by mouth every morning on an empty stomach. Indications: Underactive Thyroid 90 Tablet 3   • Ascorbic Acid (VITAMIN C) 500 MG Chew Tab Chew 500 mg every day. Indications: Inadequate Vitamin C     • albuterol (PROAIR HFA) 108 (90 Base) MCG/ACT Aero Soln inhalation aerosol Inhale 2 Puffs every four hours as needed for Shortness of Breath (wheezing). 8.5 g 3   • furosemide (LASIX) 20 MG Tab Take 1 Tablet by mouth every day. Indications: Edema 30 Tablet 0   • ambrisentan (LETAIRIS) 10 MG tablet TAKE 1 TABLET BY MOUTH EVERY DAY 30 Tablet 11   • mycophenolate (CELLCEPT) 250 MG Cap Take 250 mg by mouth 2 times a day. Indications: Liver Transplant Recipient     •  "[DISCONTINUED] saccharomyces boulardii (FLORASTOR) 250 MG Cap Take 1 Capsule by mouth 3 times a day with meals. (Patient not taking: Reported on 7/15/2022) 90 Capsule 3   • [DISCONTINUED] oxybutynin SR (DITROPAN-XL) 10 MG CR tablet Take 1 Tablet by mouth every day. (Patient not taking: No sig reported) 30 Tablet 0     No facility-administered encounter medications on file as of 7/15/2022.     Review of Systems   Constitutional: Negative.  Negative for chills, fever and malaise/fatigue.   HENT: Negative.  Negative for sore throat.    Eyes: Negative.    Respiratory: Negative.  Negative for cough, hemoptysis, sputum production, shortness of breath, wheezing and stridor.    Cardiovascular: Negative.  Negative for chest pain, palpitations, orthopnea, claudication, leg swelling and PND.   Gastrointestinal: Negative.    Genitourinary: Negative.    Musculoskeletal: Negative.    Skin: Negative.    Neurological: Negative.  Negative for dizziness, loss of consciousness and weakness.   Endo/Heme/Allergies: Negative.  Does not bruise/bleed easily.   All other systems reviewed and are negative.             Objective     BP (!) 80/58 (BP Location: Left arm, Patient Position: Sitting, BP Cuff Size: Adult)   Pulse 98   Resp 14   Ht 1.753 m (5' 9\")   Wt 54.9 kg (121 lb)   LMP 01/03/2000   SpO2 94%   BMI 17.87 kg/m²     Physical Exam  Vitals and nursing note reviewed.   Constitutional:       General: She is not in acute distress.     Appearance: She is well-developed. She is not diaphoretic.   HENT:      Head: Normocephalic and atraumatic.      Right Ear: External ear normal.      Left Ear: External ear normal.      Nose: Nose normal.      Mouth/Throat:      Pharynx: No oropharyngeal exudate.   Eyes:      General: No scleral icterus.        Right eye: No discharge.         Left eye: No discharge.      Conjunctiva/sclera: Conjunctivae normal.      Pupils: Pupils are equal, round, and reactive to light.   Neck:      Vascular: No " JVD.   Cardiovascular:      Rate and Rhythm: Normal rate and regular rhythm.      Heart sounds: No murmur heard.    No friction rub. No gallop.   Pulmonary:      Effort: Pulmonary effort is normal. No respiratory distress.      Breath sounds: No stridor. No wheezing or rales.   Chest:      Chest wall: No tenderness.   Abdominal:      General: There is no distension.      Palpations: Abdomen is soft.      Tenderness: There is no guarding.   Musculoskeletal:         General: No tenderness or deformity. Normal range of motion.      Cervical back: Neck supple.   Skin:     General: Skin is warm and dry.      Coloration: Skin is not pale.      Findings: No erythema or rash.   Neurological:      Mental Status: She is alert.      Cranial Nerves: No cranial nerve deficit.      Motor: No abnormal muscle tone.      Coordination: Coordination normal.      Deep Tendon Reflexes: Reflexes are normal and symmetric. Reflexes normal.   Psychiatric:         Behavior: Behavior normal.         Thought Content: Thought content normal.         Judgment: Judgment normal.                Assessment & Plan     1. Mild aortic regurgitation and aortic valve sclerosis     2. Pure hypercholesterolemia  TACROLIMUS BLOOD   3. Adrenal insufficiency (HCC)     4. Interstitial lung disease (HCC)  CBC W/ DIFF W/O PLATELETS    Comp Metabolic Panel   5. Hepatopulmonary syndrome (HCC)  CBC W/ DIFF W/O PLATELETS    Comp Metabolic Panel   6. Ostium secundum type atrial septal defect, S/P percutaneous closure  CBC W/ DIFF W/O PLATELETS    Comp Metabolic Panel   7. Chronic respiratory failure with hypoxia (HCC)  MYCOPHENOLIC ACID    CBC W/ DIFF W/O PLATELETS    Comp Metabolic Panel   8. Immunocompromised state (HCC)  TACROLIMUS BLOOD    MYCOPHENOLIC ACID   9. H/O non-ST elevation myocardial infarction (NSTEMI)  MYCOPHENOLIC ACID   10. Mild mitral regurgitation     11. Hypokalemia     12. Chronic prescription benzodiazepine use     13. Recurrent major  depressive disorder, in remission (HCC)     14. History of small bowel obstruction     15. Thrombocytopenia (HCC)     16. Steroid-induced hyperglycemia     17. High risk medication use     18. Mixed obsessional thoughts and acts     19. Hyperglycemia     20. SDH (subdural hematoma) (HCC)     21. Seizures (HCC)     22. Sarcoidosis (HCC)     23. Lactic acidosis     24. Moderate protein-calorie malnutrition (HCC)     25. Dizziness     26. Anemia, unspecified type     27. Diarrhea, unspecified type     28. Status post liver transplant Dr. Canada ([facility])         Medical Decision Making: Today's Assessment/Status/Plan:      62-year-old female with hepatopulmonary pulmonary hypertension status post orthotopic liver transplant now with normal PA pressures on sildenafil and Latera's.     At this point I will have her stop her tadalafil for a week.  We will see if this assists her blood pressure and symptoms.  I will also check her CellCept and tacrolimus levels.  I would like her hepatologist at [facility] to review her medications for weight loss and abdominal discomfort.  Otherwise I will see her back in 4 to 6 weeks.

## 2022-07-20 NOTE — TELEPHONE ENCOUNTER
Received request via: Pharmacy    Was the patient seen in the last year in this department? Yes  7/7/2022  Does the patient have an active prescription (recently filled or refills available) for medication(s) requested? No

## 2022-07-22 NOTE — PROGRESS NOTES
NEUROPSYCHOLOGICAL FEEDBACK    Name: Roxana Cuba    MRN: [id number]   YOB: 1960   Date of Feedback: 07/22/2022  Referred by: Dl Jimenez M.D.  Evaluated by: eLighton Chopra, Ph.D.         The patient and her daughter were seen for an interactive feedback session regarding the patient's neuropsychological evaluation on 05/04/2022 and 05/12/2022. I discussed the results of the evaluation and the recommendations in detail with the patient as well as her daughter and they expressed understanding. 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 Psychoeducation was also provided regarding how depression, anxiety, elevated stress, sleep apnea, chronic pain, and medication effects can affect cognition. Additional information was provided regarding lifestyle factors associated with brain health. Follow-up with the referring provider to discuss treatment planning was recommended. She was also encouraged to follow up with her previous referral to behavioral health. The patient and her daughter were afforded time to ask questions and all their questions were addressed.      Leighton Chopra, Ph.D.                                                                             Clinical Neuropsychologist                                                                               Department of Neurology                                                                      [facility]     Fifty-two minutes were spent conducting an interactive feedback session with the patient and her daughter.

## 2022-08-02 NOTE — TELEPHONE ENCOUNTER
Received request via: Patient    Was the patient seen in the last year in this department? Yes  LOV 07/22/2022  Does the patient have an active prescription (recently filled or refills available) for medication(s) requested? No

## 2022-08-04 NOTE — TELEPHONE ENCOUNTER
Lacosamide (Vimpat) 100mg Tablet    RTS - TRANS FEE = 0.00 EARLY FILL BY 20 DAYS (FILL ON 08/24/22) ; LAST FILLED 08/02/22 AT [facility] # [id number] (PH:[phone]) [id number] - 08/04/2022 9:31am

## 2022-08-21 NOTE — ED NOTES
Pt to be discharged home, via w/c, on home O2, with . A&Ox4    Pt given discharge instructions, verbalizes understanding of instructions

## 2022-08-21 NOTE — ED NOTES
Pt presents to ER:  Chief Complaint   Patient presents with   • Abdominal Pain   • Seizure     Per EMS, pt presents to ER with lower abd pain that started around 4 am this morning with diarrhea. States pain is sharp and stabbing. States she takes Keppra for her seizure disorder and also for abd pain. States she has had this abd pain before. States she had a seizure about an hour pta     Pt A&Ox4, no distressnoted

## 2022-08-21 NOTE — ED PROVIDER NOTES
"ED Provider Note    CHIEF COMPLAINT  Abdominal pain    HPI  Roxana Cuba is a 62 y.o. female who presents to the emergency department with right inguinal pain.  About 1 week ago patient tripped falling to the ground.  She states that she feels like she strained a quadricep muscle.  Hyperextending her right hip.  tHe day after this fall she noticed pain in her right inguinal region.  Sharp character.  Its worse when she stands up and ambulates and is associated with the pain in her quadriceps.  No pain about the knee or hip buttock back.  Denies head injury chest pain or other extremity injury.  She has chronic diarrhea which is unchanged.  She felt nauseous and did vomit 1 time yesterday but no other vomiting.  Denies dysuria hematuria frequency although chronically incontinent.  No upper abdominal pain currently over or over the last week, but does have off and on upper abdominal discomfort..  No chest pain shortness of breath.  No cough.  No fever.  No increased oxygen requirement.    History of liver transplant.  Reports compliance with her medications.    REVIEW OF SYSTEMS  As per HPI, otherwise a 10 point review of systems is negative    PAST MEDICAL HISTORY  Past Medical History:   Diagnosis Date    Anemia     Anesthesia     \"takes more to get me under, would rather be intubated\"     Breath shortness     cont oxygen 5L (Preffered)    Bronchitis      2016    Cardiomegaly     Cataract     bilateral IOL    Chronic fatigue and malaise     Chronic obstructive pulmonary disease (HCC)     Cirrhosis (HCC) 12/2011    Status post liver transplant at [facility]    CKD (chronic kidney disease) stage 3, GFR 30-59 ml/min (HCC)     Diabetes (HCC)     reports hx of, resolved w/weight loss. 6/10/22 pt reports recently told she has diabetes    Emphysema of lung (HCC)     Fracture of left foot     GERD (gastroesophageal reflux disease)          H/O Clostridium difficile infection 02/17/2017    reports \"16 months ago\". Current " "stool sample 01-26-17 neg    Heart burn     Hemorrhagic disorder (HCC)     \"low platelets\" bruises easily     Hiatus hernia syndrome     High cholesterol     Hypertension     Hypothyroid     Indigestion     Jaundice 2009    Liver transplanted (HCC)     Low back pain 02/17/2017    and left foot, 7/10    Memory difficulty     short and long term since head injury 8/2021    Mild aortic regurgitation and aortic valve sclerosis      On home oxygen therapy     5 liters, Dr. Gaming    Platelet disorder (HCC)     low platelets    Pneumonia     aspiration,     Psychiatric disorder     Mood disorder    Pulmonary hypertension (HCC)         S/P cholecystectomy     Seizure (HCC) 05/29/2022    since head injury8/ 2021    Shoulder pain     right    Small bowel obstruction (HCC)     01-    Splenomegaly     VRE (vancomycin-resistant Enterococci)     02-17-17, pt declines knowledge of this       SOCIAL HISTORY  Social History     Tobacco Use    Smoking status: Never    Smokeless tobacco: Never    Tobacco comments:     avoid all tobacco products   Vaping Use    Vaping Use: Never used   Substance Use Topics    Alcohol use: Not Currently     Alcohol/week: 0.0 oz    Drug use: No       SURGICAL HISTORY  Past Surgical History:   Procedure Laterality Date    MS COLONOSCOPY,DIAGNOSTIC N/A 6/21/2022    Procedure: COLONOSCOPY;  Surgeon: Neelam Castellanos D.O.;  Location: SURGERY [facility];  Service: Gastroenterology    MS UPPER GI ENDOSCOPY,DIAGNOSIS N/A 6/21/2022    Procedure: GASTROSCOPY;  Surgeon: Neelam Castellanos D.O.;  Location: SURGERY [facility];  Service: Gastroenterology    MS UPPER GI ENDOSCOPY,DIAGNOSIS N/A 02/03/2022    Procedure: GASTROSCOPY;  Surgeon: Aravind Richards M.D.;  Location: SURGERY SAME DAY [facility];  Service: Gastroenterology    CRANIOTOMY Left 08/04/2021    Procedure: CRANIOTOMY;  Surgeon: Tramaine Arnold III, M.D.;  Location: SURGERY [facility];  Service: Neurosurgery    VENTRAL HERNIA REPAIR " ROBOTIC XI N/A 11/12/2018    Procedure: VENTRAL HERNIA REPAIR ROBOTIC XI- FOR EPIGASTRIC INCISIONAL;  Surgeon: John H Ganser, M.D.;  Location: SURGERY [facility];  Service: General    TURBINATE REDUCTION Bilateral 10/04/2018    Procedure: TURBINATE REDUCTION;  Surgeon: Silviano Erazo M.D.;  Location: SURGERY SAME DAY [facility];  Service: Ent    NASAL RECONSTRUCTION Bilateral 10/04/2018    Procedure: NASAL RECONSTRUCTION- LATERA IMPLANTS;  Surgeon: Silviano Erazo M.D.;  Location: SURGERY SAME DAY [facility];  Service: Ent    GASTRIC BYPASS LAPAROSCOPIC  2018    PANNICULECTOMY  12/11/2017    paniculectomy    COLONOSCOPY N/A 03/27/2017    Procedure: COLONOSCOPY;  Surgeon: Osman Matt M.D.;  Location: SURGERY SAME DAY [facility];  Service:     GASTROSCOPY N/A 03/27/2017    Procedure: GASTROSCOPY;  Surgeon: Osman Matt M.D.;  Location: SURGERY SAME DAY [facility];  Service:     BREAST BIOPSY Left 02/21/2017    Procedure: BREAST BIOPSY - WIRE LOCALIZED EXCISONAL ;  Surgeon: Jane Zhao M.D.;  Location: SURGERY SAME DAY [facility];  Service:     LUNG BIOPSY OPEN  11/2016    OTHER ABDOMINAL SURGERY  12/2015    Gastric Sleeve    BONE MARROW ASPIRATION  03/16/2015    Performed by Freddie Hi M.D. at [facility]    BONE MARROW BIOPSY, NDL/TROCAR  03/16/2015    Performed by Freddie Hi M.D. at [facility]    RECOVERY  03/31/2014    Performed by [facility] at [facility]    RECOVERY  03/24/2014    Performed by [facility] at SURGERY SAME DAY [facility]    RECOVERY  01/21/2014    Performed by [facility] at SURGERY SAME DAY [facility]    BRONCHOSCOPY-ENDO  11/15/2013    Performed by Ha Perez M.D. at ENDOSCOPY [facility]    RECOVERY  02/27/2013    Performed by [facility] at SURGERY SAME DAY [facility]    BONE MARROW ASPIRATION  12/31/2012    Performed by Josemanuel Real M.D. at [facility]  ORS    BONE MARROW BIOPSY, NDL/TROCAR  12/31/2012    Performed by Josemanuel Real M.D. at ENDOSCOPY [facility]    GASTROSCOPY  09/28/2012    Performed by Aravind Richards M.D. at SURGERY [facility]    SIGMOIDOSCOPY FLEX  09/26/2012    Performed by Kristopher Cardoso M.D. at ENDOSCOPY [facility]    BRONCHOSCOPY-ENDO  06/19/2012    Performed by MARLENA MURILLO at ENDOSCOPY [facility] ORS    BRONCHOSCOPY-ENDO  05/29/2012    Performed by SUYAPA CAMARENA at ENDOSCOPY [facility] ORS    OTHER ABDOMINAL SURGERY  12/2011    Liver transplant at [facility] by Dr. [place].    GASTROSCOPY-ENDO  03/12/2010    Performed by CAMELIA SAMANO at ENDOSCOPY [facility]    EXAM UNDER ANESTHESIA  11/11/2009    Performed by BIRD ABDI at SURGERY [facility]    HEMORRHOIDECTOMY  11/11/2009    Performed by BIRD ABDI at SURGERY [facility] ORS    KELBY BY LAPAROSCOPY  09/19/2009    Performed by BIRD ABDI at SURGERY [facility] ORS    CARPAL TUNNEL RELEASE           KELBY BY LAPAROSCOPY      HERNIA REPAIR      x3    HYSTERECTOMY, TOTAL ABDOMINAL           MAMMOPLASTY REDUCTION      OTHER SURGICAL PROCEDURE      liver transplant    NV ANESTH,NOSE,SINUS SURGERY      x4    TONSILLECTOMY         CURRENT MEDICATIONS  Home Medications       Reviewed by Chaim Meraz R.N. (Registered Nurse) on 08/21/22 at 1144  Med List Status: Not Addressed     Medication Last Dose Status   albuterol (PROAIR HFA) 108 (90 Base) MCG/ACT Aero Soln inhalation aerosol  Active   ambrisentan (LETAIRIS) 10 MG tablet  Active   Ascorbic Acid (VITAMIN C) 500 MG Chew Tab  Active   CALCIUM-VITAMIN D PO  Active   furosemide (LASIX) 20 MG Tab  Active   Galcanezumab-gnlm (EMGALITY) 120 MG/ML Solution Auto-injector  Active   lacosamide (VIMPAT) 100 MG Tab tablet  Active   levetiracetam (KEPPRA) 750 MG tablet  Active   levothyroxine (SYNTHROID) 137 MCG Tab  Active   magnesium oxide (MAG-OX) 400 MG Tab tablet  Active   metoclopramide  "(REGLAN) 10 MG Tab  Active   morphine ER (MS CONTIN) 15 MG Tab CR tablet  Active   Multiple Vitamin (MULTIVITAMIN ADULT PO)  Active   mycophenolate (CELLCEPT) 250 MG Cap  Active   omeprazole (PRILOSEC) 40 MG delayed-release capsule  Active   ondansetron (ZOFRAN ODT) 4 MG TABLET DISPERSIBLE  Active   oxyCODONE immediate release (ROXICODONE) 10 MG immediate release tablet  Active   potassium chloride SA (KDUR) 20 MEQ Tab CR  Active   pravastatin (PRAVACHOL) 20 MG Tab  Active   promethazine (PHENERGAN) 25 MG Suppos  Active   sertraline (ZOLOFT) 100 MG Tab  Active   tacrolimus (PROGRAF) 1 MG Cap  Active   tadalafil (CIALIS) 20 MG tablet  Active                    ALLERGIES  Allergies   Allergen Reactions    Nsaids Unspecified     Can not take due to hx of liver transplant     Rhubarb Anaphylaxis    Organic Nitrates Unspecified     Nitroglycerin products should be avoided with the use of PDE-5 inhibitors as the combination can result in severe hypotension.  RD clarified with pt: Nitrates in food are okay and pt does not want nitrates to be listed as food allergy. Pt only avoids medications with nitrates.     Other Drug      Any medication that may interact with cyclosporine, tacrolimus, sirolimus, or prograf (due to hx of liver transplant)     Tylenol      Liver transplant    Vancomycin      Red man syndrome       PHYSICAL EXAM  VITAL SIGNS: BP (!) 140/62   Pulse 86   Temp 36.9 °C (98.5 °F) (Temporal)   Resp 18   Ht 1.753 m (5' 9\")   Wt 56.2 kg (124 lb)   LMP 01/03/2000   SpO2 91%   BMI 18.31 kg/m²    Constitutional: Awake and alert.  Chronically ill-appearing wearing home oxygen mild tenderness in the right inguinal region without palpable hernia or mass  HENT: Normal inspection  Eyes: Normal inspection  Neck: Grossly normal range of motion.  Cardiovascular: Normal heart rate, Normal rhythm.  Symmetric peripheral pulses.   Thorax & Lungs: No respiratory distress, No wheezing, No rales, No rhonchi, No chest " tenderness.   Abdomen: Bowel sounds normal, soft, non-distended, tenderness the right inguinal ligament without palpable hernia or mass.  Multiple scars noted.  No tenderness about the abdomen either in the left upper or lower regions.  Skin: No obvious rash.  Back: No tenderness, No CVA tenderness.   Extremities: No clubbing, cyanosis, edema, no Homans or cords.  Neurologic: Grossly normal   Psychiatric: Normal for situation    RADIOLOGY/PROCEDURES  DX-FEMUR-2+ RIGHT   Final Result      No fracture or dislocation is seen.      CT-PELVIS W/O PLUS RECONS   Final Result      1.  No fracture or dislocation is seen.   2.  Degenerative changes in the lower lumbar spine.   3.  Calcific density in the right pelvis appears intraluminal within small bowel and may represent ingested material   4.  Mild prominence of the appendix without periappendiceal inflammatory changes identified. Early/mild appendicitis difficult to exclude, however.   5.  Clinical correlation recommended.           Imaging is interpreted by radiologist    Labs:  Results for orders placed or performed during the hospital encounter of 08/21/22   CBC with Differential   Result Value Ref Range    WBC 3.1 (L) 4.8 - 10.8 K/uL    RBC 3.87 (L) 4.20 - 5.40 M/uL    Hemoglobin 11.9 (L) 12.0 - 16.0 g/dL    Hematocrit 35.1 (L) 37.0 - 47.0 %    MCV 90.7 81.4 - 97.8 fL    MCH 30.7 27.0 - 33.0 pg    MCHC 33.9 33.6 - 35.0 g/dL    RDW 46.6 35.9 - 50.0 fL    Platelet Count 128 (L) 164 - 446 K/uL    MPV 8.7 (L) 9.0 - 12.9 fL    Neutrophils-Polys 63.20 44.00 - 72.00 %    Lymphocytes 26.60 22.00 - 41.00 %    Monocytes 8.70 0.00 - 13.40 %    Eosinophils 0.60 0.00 - 6.90 %    Basophils 0.60 0.00 - 1.80 %    Immature Granulocytes 0.30 0.00 - 0.90 %    Nucleated RBC 0.00 /100 WBC    Neutrophils (Absolute) 1.97 (L) 2.00 - 7.15 K/uL    Lymphs (Absolute) 0.83 (L) 1.00 - 4.80 K/uL    Monos (Absolute) 0.27 0.00 - 0.85 K/uL    Eos (Absolute) 0.02 0.00 - 0.51 K/uL    Baso (Absolute)  0.02 0.00 - 0.12 K/uL    Immature Granulocytes (abs) 0.01 0.00 - 0.11 K/uL    NRBC (Absolute) 0.00 K/uL   Complete Metabolic Panel   Result Value Ref Range    Sodium 140 135 - 145 mmol/L    Potassium 4.0 3.6 - 5.5 mmol/L    Chloride 97 96 - 112 mmol/L    Co2 25 20 - 33 mmol/L    Anion Gap 18.0 (H) 7.0 - 16.0    Glucose 104 (H) 65 - 99 mg/dL    Bun 13 8 - 22 mg/dL    Creatinine 0.72 0.50 - 1.40 mg/dL    Calcium 9.1 8.4 - 10.2 mg/dL    AST(SGOT) 66 (H) 12 - 45 U/L    ALT(SGPT) 59 (H) 2 - 50 U/L    Alkaline Phosphatase 202 (H) 30 - 99 U/L    Total Bilirubin 0.3 0.1 - 1.5 mg/dL    Albumin 3.8 3.2 - 4.9 g/dL    Total Protein 6.1 6.0 - 8.2 g/dL    Globulin 2.3 1.9 - 3.5 g/dL    A-G Ratio 1.7 g/dL   Lipase   Result Value Ref Range    Lipase 9 7 - 58 U/L   Urinalysis    Specimen: Urine   Result Value Ref Range    Color Yellow     Character Clear     Specific Gravity 1.015 <1.035    Ph 6.0 5.0 - 8.0    Glucose Negative Negative mg/dL    Ketones Negative Negative mg/dL    Protein Negative Negative mg/dL    Bilirubin Negative Negative    Nitrite Negative Negative    Leukocyte Esterase Small (A) Negative    Occult Blood Trace (A) Negative    Micro Urine Req Microscopic    ESTIMATED GFR   Result Value Ref Range    GFR (CKD-EPI) 94 >60 mL/min/1.73 m 2   URINE MICROSCOPIC (W/UA)   Result Value Ref Range    WBC 5-10 (A) /hpf    RBC 0-2 /hpf    Bacteria Rare (A) None /hpf    Epithelial Cells Rare Few /hpf     *Note: Due to a large number of results and/or encounters for the requested time period, some results have not been displayed. A complete set of results can be found in Results Review.         COURSE & MEDICAL DECISION MAKING  Patient presents with right inguinal pain.  She has no tenderness throughout the remainder of her abdomen.  She describes onset after a mechanical fall tripping.  Her vital signs are normal.  She has not had a fever.  Obtain x-ray of the femur.  Will obtain CT pelvis for definitive evaluation of bony  structures.  Obtain screening laboratory data as she reports she has chronic diarrhea.    Laboratory data returns with stable n depressed WBC count.  Patient is found to have new increased liver function tests as noted above.  Lipase is normal.  Bilirubin is normal.  We will discuss transaminitis with hep otology.    Nicolas Tello, hepatologist at [facility].  Reviewed the case.  He reviewed the patient's data remotely.  He did express some concern about the elevating LFTs but did not advise any emergent intervention.  Patient recently had an ultrasound of the liver that was unremarkable.  He would like to see the patient in his office in 1 week for recheck and they will recheck labs.    X-ray of the right femur was negative.  CT pelvis does not show fracture.  Incidentally the appendix appeared more prominent than inspected without associated inflammatory changes.  Patient's abdomen was examined again she has no abdominal pain or tenderness.  This does not appear to represent appendicitis.  Await urinalysis.    Urinalysis with trace leukocyte Estrace.  Without urinary symptoms we will hold antibiotics.    At this point the patient is stable for outpatient management.  I suspect a quadriceps/inguinal strain.  I will refer to orthopedics for further assessment of persistent pain.  Advised rest, ice and continue her home pain medication regimen.  Advised immediate return to ER for any fever, any abdominal pain, vomiting, dysuria or other alarming symptoms.  Patient voiced understanding discharge satisfactory condition.    FINAL IMPRESSION  1.  Right inguinal and quadriceps muscle strain  2.  Elevated LFTs in the setting of liver transplant requiring follow-up  3.  Stable pancytopenia likely related to immunosuppressants      This dictation was created using voice recognition software. The accuracy of the dictation is limited to the abilities of the software.  The nursing notes were reviewed and certain aspects of  this information were incorporated into this note.      Electronically signed by: Yuri Fitzgerald M.D., 8/21/2022 12:11 PM

## 2022-08-21 NOTE — DISCHARGE INSTRUCTIONS
Rest, continue pain medications, light stretching.  Follow-up with orthopedics for further assessment of your hip pain.  Please see your liver specialist for recheck of your liver function tests.  Return to the emergency department if you have any abdominal pain, fevers or concern

## 2022-08-24 NOTE — TELEPHONE ENCOUNTER
"ED Follow-up  Call Attempts: 1  Date of ED visit: 08/21/22  Call Outcome: Reviewed ED visit with patient  Since you've been home, how have you been feeling?: Unchanged  Were you prescribed any medications?: No  Do you have any questions about your discharge instructions?: No  RN Recommendations: Other  Additional details: Member stated she is still in pain and Tylenol is not helping. Inquired if member has made an appointment as yet to see PCP and SHEL[facility]E as recommended by the ER visit. Member stated, \"No\". Instructed to call PCP and [facility] for appt. and to let them know that she was in the ER . Also instructed to let them know that the Tylenol is not alleviating her pain. Member verbalized understanding and will follow through.   Total time spent (mins): 5      "

## 2022-08-25 NOTE — TELEPHONE ENCOUNTER
ESTABLISHED PATIENT PRE-VISIT PLANNING     Patient was NOT contacted to complete PVP.     Note: Patient will not be contacted if there is no indication to call.     1.  Reviewed notes from the last few office visits within the medical group: Yes    2.  If any orders were placed at last visit or intended to be done for this visit (i.e. 6 mos follow-up), do we have Results/Consult Notes?           Labs - Labs were not ordered at last office visit.  Note: If patient appointment is for lab review and patient did not complete labs, check with provider if OK to reschedule patient until labs completed.         Imaging - Imaging was not ordered at last office visit.         Referrals - No referrals were ordered at last office visit.    3. Is this appointment scheduled as a Hospital Follow-Up? Yes, visit was at [facility].     4.  Immunizations were updated in Epic using Reconcile Outside Information activity? Yes    5.  Patient is due for the following Health Maintenance Topics:   Health Maintenance Due   Topic Date Due    Annual Wellness Visit  Never done    IMM ZOSTER VACCINES (2 of 2) 01/29/2019    COVID-19 Vaccine (4 - Booster for Moderna series) 02/01/2022     6.  AHA (Pulse8) form printed for Provider? N/A

## 2022-08-27 PROBLEM — T14.90XA TRAUMA: Status: ACTIVE | Noted: 2022-01-01

## 2022-08-27 PROBLEM — S06.5XAA SUBDURAL HEMATOMA, POST-TRAUMATIC (HCC): Status: ACTIVE | Noted: 2022-01-01

## 2022-08-27 PROBLEM — S72.001A DISPLACED FRACTURE OF RIGHT FEMORAL NECK (HCC): Status: ACTIVE | Noted: 2022-01-01

## 2022-08-27 PROBLEM — D69.1 PLATELET DYSFUNCTION (HCC): Status: ACTIVE | Noted: 2022-01-01

## 2022-08-27 PROBLEM — Z53.09 CONTRAINDICATION TO DEEP VEIN THROMBOSIS (DVT) PROPHYLAXIS: Status: ACTIVE | Noted: 2022-01-01

## 2022-08-27 PROBLEM — R29.6 FREQUENT FALLS: Status: ACTIVE | Noted: 2022-01-01

## 2022-08-28 PROBLEM — Z02.9 DISCHARGE PLANNING ISSUES: Status: ACTIVE | Noted: 2022-01-01

## 2022-08-28 PROBLEM — D62 ACUTE BLOOD LOSS ANEMIA: Status: ACTIVE | Noted: 2022-01-01

## 2022-08-28 PROBLEM — M25.521 RIGHT ELBOW PAIN: Status: ACTIVE | Noted: 2022-01-01

## 2022-08-28 PROBLEM — Z75.8 DISCHARGE PLANNING ISSUES: Status: ACTIVE | Noted: 2022-01-01

## 2022-08-28 NOTE — ED NOTES
Report from Marilee FRANCIS, assuming care of pt. Pt resting in gurney, respirations even, no needs at this time.

## 2022-08-28 NOTE — ASSESSMENT & PLAN NOTE
Admit ALP mildly elevated, other LFTs normal  Chronic condition treated with tacrolimus, cellcept, furosemide, fludrocortisone, and K-dur  Resumed maintenance medication on admission.    8/29 Medicine consulted.

## 2022-08-28 NOTE — ANESTHESIA PROCEDURE NOTES
Peripheral Block    Date/Time: 8/28/2022 1:10 PM  Performed by: Nirmal Chaparro M.D.  Authorized by: Nirmal Chaparro M.D.     Patient Location:  Pre-op  Start Time:  8/28/2022 1:10 PM  Reason for Block: at surgeon's request and post-op pain management ONLY    patient identified, IV checked, site marked, risks and benefits discussed, surgical consent, monitors and equipment checked, pre-op evaluation and timeout performed    Patient Position:  Supine  Prep: ChloraPrep    Monitoring:  Heart rate, continuous pulse ox and cardiac monitor  Block Region:  Lower Extremity  Lower Extremity - Block Type:  FEMORAL nerve block, Supra-Inguinal Fascia Iliaca approach    Laterality:  Right  Procedures: ultrasound guided  Image captured, interpreted and electronically stored.  Local Infiltration:  Lidocaine  Strength:  1 %  Dose:  3 ml  Block Type:  Single-shot  Needle Length:  100mm  Needle Gauge:  21 G  Needle Localization:  Ultrasound guidance  Injection Assessment:  Negative aspiration for heme, no paresthesia on injection, incremental injection and local visualized surrounding nerve on ultrasound

## 2022-08-28 NOTE — CARE PLAN
Problem: Pain - Standard  Goal: Alleviation of pain or a reduction in pain to the patient’s comfort goal  Note: Educated patient on 0-10 pain scale. Assessing and documenting patient's pain per hospital policy and prn and medicating patient for pain per MAR.       Problem: Venous Thromboembolism (VTE) Prevention  Goal: The patient will remain free from venous thromboembolism (VTE)  Note: Pharmacologic prophylaxis contraindicated per MD. Intermittent sequential compression device in place on BLE to prevent DVT/VTE.    Problem: Skin breakdown prevention  Goal: The patient will remain free from pressure ulcers  Note: mepilex placed, TAPs system in use, oxyegn tubing changed to silicone oxygen tubing with grey foams, pillows to offload pressure on elbows and heels     The patient is Watcher - Medium risk of patient condition declining or worsening    Shift Goals  Clinical Goals: maintain neuro status q1h checks in place  Patient Goals: pain control  Family Goals: none present    Progress made toward(s) clinical / shift goals:  q1h neuro checks in place A&Ox4, pain controlled with non-pharmacologic and pharmacological pain management techniques    Patient is not progressing towards the following goals: N/A

## 2022-08-28 NOTE — H&P
"    TRAUMA HISTORY AND PHYSICAL    DATE OF SERVICE: 8/27/2022    HISTORY OF PRESENT ILLNESS: The patient is a 62 year-old female who was injured after rolling out of bed onto her right side. She fell to the ground and was not able to get up due to pain in the right hip.  She was also knocked unconscious.  EMS was called and she was brought to [facility] for evaluation.  She was found to have a right femoral neck fracture and a variety of intra-cranial hemorrhages ranging from acute to subacute to chronic.  She was evaluated at bedside in the ER.  She reports multiple episodes of falling over the past year.  Some witnessed, some not.  She does not attribute them to tripping or weakness.  She cannot recall any particular inciting or contributing factors.      PAST MEDICAL HISTORY:   Past Medical History:   Diagnosis Date    Anemia     Cardiomegaly     Cataract     bilateral IOL    Chronic obstructive pulmonary disease (HCC)     Cirrhosis (HCC) 12/2011    Status post liver transplant at [facility]    CKD (chronic kidney disease) stage 3, GFR 30-59 ml/min ([facility])     Diabetes (HCC)     reports hx of, resolved w/weight loss. 6/10/22 pt reports recently told she has diabetes    Emphysema of lung (HCC)     Fracture of left foot     GERD (gastroesophageal reflux disease)          H/O Clostridium difficile infection 02/17/2017    reports \"16 months ago\". Current stool sample 01-26-17 neg    Heart burn     Hemorrhagic disorder ([facility])     \"low platelets\" bruises easily     Hiatus hernia syndrome     High cholesterol     Hypertension     Hypothyroid     Indigestion     Jaundice 2009    Liver transplanted ([facility])     Low back pain 02/17/2017    and left foot, 7/10    Memory difficulty     short and long term since head injury 8/2021    Mild aortic regurgitation and aortic valve sclerosis      On home oxygen therapy     5 liters, Dr. Gaming    Platelet disorder ([facility])     low platelets    Pneumonia     aspiration,     Psychiatric disorder  "    Mood disorder    Pulmonary hypertension ([facility])         S/P cholecystectomy     Seizure ([facility]) 05/29/2022    since head injury8/ 2021    Shoulder pain     right    Small bowel obstruction (HCC)     01-    Splenomegaly     VRE (vancomycin-resistant Enterococci)     02-17-17, pt declines knowledge of this         PAST SURGICAL HISTORY:   Past Surgical History:   Procedure Laterality Date    CT COLONOSCOPY,DIAGNOSTIC N/A 6/21/2022    Procedure: COLONOSCOPY;  Surgeon: Neelam Castellanos D.O.;  Location: SURGERY [facility];  Service: Gastroenterology    CT UPPER GI ENDOSCOPY,DIAGNOSIS N/A 6/21/2022    Procedure: GASTROSCOPY;  Surgeon: Neelam Castellanos D.O.;  Location: SURGERY [facility];  Service: Gastroenterology    CT UPPER GI ENDOSCOPY,DIAGNOSIS N/A 02/03/2022    Procedure: GASTROSCOPY;  Surgeon: Aravind Richards M.D.;  Location: SURGERY SAME DAY [facility];  Service: Gastroenterology    CRANIOTOMY Left 08/04/2021    Procedure: CRANIOTOMY;  Surgeon: Tramaine Arnold III, M.D.;  Location: [facility];  Service: Neurosurgery    VENTRAL HERNIA REPAIR ROBOTIC XI N/A 11/12/2018    Procedure: VENTRAL HERNIA REPAIR ROBOTIC XI- FOR EPIGASTRIC INCISIONAL;  Surgeon: John H Ganser, M.D.;  Location: [facility];  Service: General    TURBINATE REDUCTION Bilateral 10/04/2018    Procedure: TURBINATE REDUCTION;  Surgeon: Silviano Erazo M.D.;  Location: SURGERY SAME DAY [facility];  Service: Ent    NASAL RECONSTRUCTION Bilateral 10/04/2018    Procedure: NASAL RECONSTRUCTION- LATERA IMPLANTS;  Surgeon: Silviano Erazo M.D.;  Location: SURGERY SAME DAY [facility];  Service: Ent    GASTRIC BYPASS LAPAROSCOPIC  2018    PANNICULECTOMY  12/11/2017    paniculectomy    COLONOSCOPY N/A 03/27/2017    Procedure: COLONOSCOPY;  Surgeon: Osman Matt M.D.;  Location: SURGERY SAME DAY [facility];  Service:     GASTROSCOPY N/A 03/27/2017    Procedure: GASTROSCOPY;  Surgeon: Osman Matt M.D.;  Location: SURGERY  SAME DAY [facility];  Service:     BREAST BIOPSY Left 02/21/2017    Procedure: BREAST BIOPSY - WIRE LOCALIZED EXCISONAL ;  Surgeon: Jane Zhao M.D.;  Location: SURGERY SAME DAY [facility];  Service:     LUNG BIOPSY OPEN  11/2016    OTHER ABDOMINAL SURGERY  12/2015    Gastric Sleeve    BONE MARROW ASPIRATION  03/16/2015    Performed by Marlena Hi M.D. at [facility]    BONE MARROW BIOPSY, NDL/TROCAR  03/16/2015    Performed by Marlena Hi M.D. at ENDOSCOPY [facility]    RECOVERY  03/31/2014    Performed by Ir[facility]Recovery Surgery at SURGERY [facility]    RECOVERY  03/24/2014    Performed by Cath-Recovery Surgery at SURGERY SAME DAY [facility]    RECOVERY  01/21/2014    Performed by [facility]overy Surgery at SURGERY SAME DAY [facility]    BRONCHOSCOPY-ENDO  11/15/2013    Performed by Ha Perez M.D. at [facility]    RECOVERY  02/27/2013    Performed by Ir[facility]Recovery Surgery at SURGERY SAME DAY [facility]    BONE MARROW ASPIRATION  12/31/2012    Performed by Josemanuel Real M.D. at ENDOSCOPY [facility]    BONE MARROW BIOPSY, NDL/TROCAR  12/31/2012    Performed by Josemanuel Real M.D. at [facility]    GASTROSCOPY  09/28/2012    Performed by Aravind Richards M.D. at SURGERY [facility]    SIGMOIDOSCOPY FLEX  09/26/2012    Performed by Kristopher Cardoso M.D. at [facility]    BRONCHOSCOPY-ENDO  06/19/2012    Performed by MARLENA MURILLO at ENDOSCOPY [facility]    BRONCHOSCOPY-ENDO  05/29/2012    Performed by SUYAPA CAMARENA at ENDOSCOPY [facility]    OTHER ABDOMINAL SURGERY  12/2011    Liver transplant at [facility] by Dr. Canada.    GASTROSCOPY-ENDO  03/12/2010    Performed by CAMELIA SAMANO at [facility]    EXAM UNDER ANESTHESIA  11/11/2009    Performed by BIRD ABDI at SURGERY [facility]    HEMORRHOIDECTOMY  11/11/2009    Performed by BIRD ABDI at SURGERY [facility]     KELBY BY LAPAROSCOPY  09/19/2009    [facility]    CARPAL TUNNEL RELEASE           KELBY BY LAPAROSCOPY      HERNIA REPAIR      x3    HYSTERECTOMY, TOTAL ABDOMINAL           MAMMOPLASTY REDUCTION      OTHER SURGICAL PROCEDURE      liver transplant    HI ANESTH,NOSE,SINUS SURGERY      x4    TONSILLECTOMY            ALLERGIES: Nsaids, Rhubarb, Organic nitrates, Other drug, Tylenol, and Vancomycin       CURRENT MEDICATIONS:   Please refer to the medication reconciliation in Epic which I have reviewed.    FAMILY HISTORY: family history includes Alcohol/Drug in her maternal grandfather, maternal grandmother, and mother; Cancer in her paternal aunt; Diabetes in her father; Heart Disease in her father; Hyperlipidemia in her father and mother; Hypertension in her father; Non-contributory in her mother; Other in her father; Stroke in her father.      SOCIAL HISTORY:  reports that she has never smoked. She has never used smokeless tobacco. She reports that she does not currently use alcohol. She reports that she does not use drugs.      REVIEW OF SYSTEMS:   Comprehensive review of systems is negative with the exception of the aforementioned HPI, PMH, and PSH bullets in accordance with CMS guidelines.    PHYSICAL EXAMINATION:   VITAL SIGNS:   /77   Pulse (!) 104   Temp 36.5 °C (97.7 °F) (Temporal)   Resp 18   Wt 52 kg (114 lb 10.2 oz)   SpO2 97%     GENERAL:   The patient appears frail, shaky, and cachectic    HEENT:  HEAD: There is ecchymosis and swelling over the right temple.  EARS: The ear canals and tympanic membranes are normal.Normal pinna bilaterally.    EYES:  The pupils are equal, round, and reactive to light bilaterally. The extraocular muscles are intact bilaterally..    NOSE: No rhinorrhea.  The bilateral nares are clear.  THROAT: Oral mucosa is moist.  The oropharynx are clear.    FACE:   The midface and jaw are stable. No malocclusion is evident.    NECK:     The cervical spine was examined utilizing spinal motion restriction.   No posterior midline cervical-spine tenderness, no evidence of intoxication, normal level of alertness (G[place]ford Coma Scale 15), no focal neurologic deficit, and no painful distracting injuries..    CHEST:    Inspection: Unlabored respirations, no intercostal retractions, paradoxical motion, or accessory muscle use.  Palpation:  The chest is nontender. The clavicles are non deformed bilaterally..  Auscultation: normal.    CARDIOVASCULAR:    Auscultation: regular rate and rhythm.  Peripheral Pulses: Normal.      ABDOMEN:    Abdomen is soft, nontender, without organomegaly or masses.  Well healed chevron incision without hernia.    BACK/PELVIS:    The thoracolumbar spine was examined utilizing spinal motion restriction.   Inspection and palpation reveal no significant tenderness, swelling, or deformity in the thoracolumbar region.  The pelvis is stable.    RECTAL:  Deferred    GENITOURINARY:  The patient has normal external reproductive anatomy.    EXTREMITIES:  RIGHT ARM: Without deformities, wounds, lacerations, or excoriations.  Full passive and active range of motion without pain.  LEFT ARM: Without deformities, wounds, lacerations, or excoriations.  Full passive and active range of motion without pain.  RIGHT LEG: Without deformities, wounds, lacerations, or excoriations.  Full passive and active range of motion limited due to hip pain.  LEFT LEG: Without deformities, wounds, lacerations, or excoriations.  Full passive and active range of motion without pain.    NEUROLOGIC:    Gildford Coma Scale (GCS) 15.   Neurologic examination revealed no focal deficits noted, mental status intact, cranial nerves II through XII intact.    SKIN:  The skin is warm, dry, and flushed.      LABORATORY VALUES:   Recent Labs     08/27/22  1827   WBC 7.5   RBC 3.63*   HEMOGLOBIN 11.3*   HEMATOCRIT 32.7*   MCV 90.1   MCH 31.1   MCHC 34.6   RDW 45.1   PLATELETCT  146*   MPV 9.0     Recent Labs     08/27/22  1827   SODIUM 139   POTASSIUM 4.3   CHLORIDE 100   CO2 28   GLUCOSE 205*   BUN 20   CREATININE 0.75   CALCIUM 8.7     Recent Labs     08/27/22  1827   ASTSGOT 38   ALTSGPT 34   TBILIRUBIN 0.5   ALKPHOSPHAT 197*   GLOBULIN 2.2   INR 1.05     Recent Labs     08/27/22  1827   APTT 28.7   INR 1.05        IMAGING:   CT-HEAD W/O   Final Result      1.  Right holohemispheric mixed density extra-axial hemorrhage with subacute to chronic components in a parasagittal location and overlying the high right parietal region measuring up to 8 mm in depth. There are also acute components more inferiorly with    a biconvex focal area seen overlying the right posterior parietal region measuring 8 mm in depth. This likely represents subdural hemorrhage however the biconvex focus of hemorrhage could represent an epidural component.      2.  No significant mass effect or midline shift.      3.  Atrophy.      4.  Surgical change involving the left calvarium.      5.  This was discussed with ANH CONLEY at 7:06 PM on 8/27/2022.         DX-KNEE 2- RIGHT   Final Result         1. No acute osseous abnormality but evaluation is limited due to osseous demineralization.      DX-HIP-UNILATERAL-WITH PELVIS-1 VIEW RIGHT   Final Result         Acute angulated fracture of the right femoral neck      DX-CHEST-PORTABLE (1 VIEW)   Final Result      No evidence of acute cardiopulmonary process.      US-TRAUMA VEIN SCREEN LOWER BILAT EXTREMITY    (Results Pending)   CT-HEAD W/O    (Results Pending)       IMPRESSION AND PLAN:  Platelet dysfunction (HCC)- (present on admission)  Assessment & Plan  Marked AA pathway platelet inhibition on admission platelet mapping thromoboelastography.  One unit of platelets administered.  8/28 Follow up platelet mapping PENDING    Subdural hematoma, post-traumatic (HCC)- (present on admission)  Assessment & Plan  Right holohemispheric subdural hemorrhage with subacute to  chronic components in a parasagittal location overlying the right parietal region measuring up to 8 mm in depth.  Follow up head CT at 0100.  Definitive plan pending.   Post traumatic pharmacologic seizure prophylaxis for 1 week.  Speech Language Pathology cognitive evaluation.  Maxwell Okeefe MD. Neurosurgeon. [facility].    Displaced fracture of right femoral neck (HCC)- (present on admission)  Assessment & Plan  Acute angulated fracture of the right femoral neck.  Definitive operative reduction and stabilization pending. Plan for hemiarthroplasty in AM.  Weight bearing status - Nonweightbearing RLE.  Anupam Ferrer MD. Orthopedic Surgery     History of liver transplant (HCC)- (present on admission)  Assessment & Plan  Admit ALP mildly elevated, other LFTs normal  Chronic condition treated with tacrolimus, cellcept, furosemide, fludrocortisone, and K-dur  Resumed maintenance medication on admission.      Frequent falls- (present on admission)  Assessment & Plan  Multiple over the preceding year while in the care of her   PT/OT ordered to assess mobility needs.   consult placed for safety concerns    Contraindication to deep vein thrombosis (DVT) prophylaxis- (present on admission)  Assessment & Plan  Prophylactic anticoagulation for thrombotic prevention initially contraindicated secondary to elevated bleeding risk.  8/28 Trauma surveillance venous duplex scanning ordered.      Primary pulmonary hypertension (HCC)- (present on admission)  Assessment & Plan  Chronic condition treated with tadalafil, ambrisentan.  Resumed maintenance medication on admission.   3/2022 ECHO without evidence of pulmonary hypertension, normal right atrial and ventricular pressures    Chronic nausea- (present on admission)  Assessment & Plan  Chronic condition treated with phenergan, zofran, reglan.  Resumed maintenance medication on admission.    GERD (gastroesophageal reflux disease)- (present  on admission)  Assessment & Plan  Chronic condition treated with Omeprazole.  Resumed maintenance medication on admission.    Chronic pain syndrome- (present on admission)  Assessment & Plan  Chronic condition treated with MS Contin and Oxycodone.  Resumed maintenance medication on admission.    Other hyperlipidemia- (present on admission)  Assessment & Plan  Chronic condition treated with pravastatin.  Resumed maintenance medication on admission.    Trauma- (present on admission)  Assessment & Plan  Fell off a bed. Unknown loss of consciousness.  Non Trauma Activation.  Andriy Robertson MD. Trauma Surgery.    DUC (generalized anxiety disorder)- (present on admission)  Assessment & Plan  Chronic condition treated with clonazepam.  Resumed maintenance medication on admission.        Hypothyroidism- (present on admission)  Assessment & Plan  Chronic condition treated with Levothyroxine.  Resumed maintenance medication on admission.      I independently reviewed pertinent clinical lab tests since admission and ordered additional follow up clinical lab tests.  I independently reviewed pertinent radiographic images and the radiologist's reports since admission and ordered additional follow up radiographic studies.  The details of the available patient records in [facility] (including laboratory tests, culture data, medications, imaging, and other pertinent diagnostic tests) and documentation by consulting physicians (when applicable) were reviewed, summated, and that information was utilized as warranted in today's medical decision making for this patient.  I personally evaluated the patient condition at bedside, discussed the daily plan(s) with available nursing staff, dieticians, social   I evaluated the patient's condition at bedside.    The patient is critically ill with severe closed head injury requiring Review of interval medical and surgical history, current medications and outpatient medication reconciliation,  interval imaging studies and radiologist interpretation, and interval laboratory values.  Management of traumatic brain injury.  coordination, prioritization, and implementation of therapeutic interventions for neurosurgical and orthopedic injuries  Management of diabetes and liver transplantation and pulmonary hypertension  involving high complexity decision making and medically necessary care by providing frequent assessment, manipulation, and support of central nervous system function to prevent further life-threatening deterioration of the patient's condition.     This patient requires continued ICU management and hospital admission.  The patient has impairment of one or more vital organ systems and a high probability of imminent or life-threatening deterioration in condition.     Aggregated care time spent evaluating, reassessing, reviewing documentation, providing care, and managing this patient exclusive of procedures: 75 minutes  ____________________________________   CLINT Christopher / SAVAGE     DD: 8/27/2022   DT: 11:23 PM

## 2022-08-28 NOTE — PROGRESS NOTES
RN called blood bank to release platelets, per Finn they are out of platelets and next box is set to arrive within 1-2 hours. Blood bank to call RN back when platelets are available.

## 2022-08-28 NOTE — ASSESSMENT & PLAN NOTE
Fell off a bed. Unknown loss of consciousness.  Non Trauma Activation.  Andriy Robertson MD. Trauma Surgery.

## 2022-08-28 NOTE — ASSESSMENT & PLAN NOTE
Chronic condition treated with MS Contin and Oxycodone.  Resumed maintenance medication on admission.

## 2022-08-28 NOTE — ASSESSMENT & PLAN NOTE
Date of admission: 8/27/2022.  8/29Transfer orders from SICU  8/28 Rehab referral.  Cleared for discharge: No.  Discharge delayed: No.  Discharge date: tbd.

## 2022-08-28 NOTE — ANESTHESIA PROCEDURE NOTES
Airway    Date/Time: 8/28/2022 1:27 PM  Performed by: Nirmal Chaparro M.D.  Authorized by: Nirmal Chapraro M.D.     Location:  OR  Urgency:  Elective  Indications for Airway Management:  Anesthesia      Spontaneous Ventilation: absent    Sedation Level:  Deep  Preoxygenated: Yes    Patient Position:  Sniffing  Final Airway Type:  Endotracheal airway  Final Endotracheal Airway:  ETT  Cuffed: Yes    Technique Used for Successful ETT Placement:  Direct laryngoscopy    Insertion Site:  Oral  Blade Type:  Mariza  Laryngoscope Blade/Videolaryngoscope Blade Size:  3  ETT Size (mm):  7.0  Measured from:  Teeth  ETT to Teeth (cm):  23  Placement Verified by: auscultation and capnometry    Cormack-Lehane Classification:  Grade I - full view of glottis  Number of Attempts at Approach:  1

## 2022-08-28 NOTE — ASSESSMENT & PLAN NOTE
Marked AA pathway platelet inhibition on admission platelet mapping thromoboelastography. One unit of platelets administered.  8/28 Follow up platelet mapping with persistent AA inhibition. One unit of platelets administered.

## 2022-08-28 NOTE — ASSESSMENT & PLAN NOTE
Prophylactic anticoagulation for thrombotic prevention initially contraindicated secondary to elevated bleeding risk.  8/29 Trauma screening bilateral lower extremity venous duplex negative for above knee DVT.

## 2022-08-28 NOTE — ASSESSMENT & PLAN NOTE
Chronic condition treated with tadalafil, ambrisentan.  Resumed maintenance medication on admission.   3/2022 ECHO without evidence of pulmonary hypertension, normal right atrial and ventricular pressures.

## 2022-08-28 NOTE — ED NOTES
Med rec updated and complete. Allergies reviewed. Confirmed name and date of birth. Placed call to family () to confirm medications and last doses taken.  No antibiotic use in last 30 days.  Perr family pt has the AMBRISENTAN at bedside.    Home pharmacy [facility] [phone]

## 2022-08-28 NOTE — CONSULTS
DATE OF SERVICE:  08/27/2022     NEUROSURGICAL CONSULTATION     HISTORY OF PRESENT ILLNESS:  The patient is a pleasant 62-year-old female who   had a mechanical fall in her house today.  She said that she thinks that she   felt lightheaded before the fall.  She has no recollection of it.  She has   some nausea, but no vomiting.  No focal weakness, numbness or tingling.  She   has severe right-sided leg pain.     PAST MEDICAL HISTORY:  Anemia, shortness of breath, bronchitis, cardiomegaly,   cataracts, chronic fatigue, COPD, cirrhosis, chronic kidney disease stage III,   diabetes, emphysema, GERD, heartburn, hiatal hernia, high cholesterol,   hypertension, hypothyroidism, liver transplant, low back pain, memory   difficulty, aortic regurg and aortic valve stenosis, platelet disorder,   psychiatric disorder, pulmonary hypertension, seizure disorder, small-bowel   obstruction.     PAST SURGICAL HISTORY:  Extensive laparoscopic cholecystectomy, total   abdominal hysterectomy, hemorrhoidectomy, gastroscopy, carpal tunnel release,   bone marrow aspiration and biopsy, lung biopsy, tonsillectomy, breast biopsy,   colonoscopy, gastric bypass, hernia repair, mammoplasty reduction,   panniculectomy, turbinate reduction, nasal reconstruction, ventral hernia   repair, craniotomy in 08/2021, craniotomy was done by Dr. Arnold in 08/2021 on   the left side, liver transplant.     MEDICATIONS:  Albuterol, Letairis, vitamin C, Klonopin, Florinef, Lasix,   Vimpat, Keppra, Synthroid, mag oxide, Reglan, MS Contin, CellCept, Prilosec,   Zofran, oxycodone, K-Dur, Pravachol, Phenergan, Zoloft, Prograf, Cialis.     LABORATORY VALUES:  CBC significant for white count of 7.5, hemoglobin 11.3,   platelets 146.  Basic metabolic panel within normal limits except for glucose   of 205.  INR 1.05, PTT 28.7.  TEG is ____ .  AA inhibition is 95%, ADP   inhibition is 68%.     IMAGING:  CT of the head noncontrast shows what looked like some acute    subdural hematomas and some subacute to chronic subdural hematomas.  There is   a very small ovoid area measuring 6-7 mm thick and 8-9 mm in AP dimension near   the vertex.  The collection is also very small and subacute.  There is   evidence of left-sided craniotomy with no residual to maybe some dural   thickening.  There is no mass effect.  There is no shift.  There is   significant atrophy.     PLAN:  Given the patient's complex medical history and complex subdural with   both subacute and acute components, would recommend platelet treatment at this   point given the ADP and AA abnormality on TEG.  Continue her home   antiepileptics.  Keep her in the ICU tonight and systolics less than 160.  If   her repeat scan is stable tomorrow morning, I think it is safe for her to go   to the operating room with orthopedics to fix her femoral neck fracture.  We   will follow.        ______________________________  MD ERIC Mcginnis/SHAN/ANN    DD:  08/27/2022 21:27  DT:  08/27/2022 21:55    Job#:  [id number]

## 2022-08-28 NOTE — PROGRESS NOTES
Neurosurgery Progress Note    Subjective:  No events    Exam:    A&O x3, GCS 15  PERRL, EOMI  Face symm, tongue midline  HOPE with FS, except right prox le secondary to femur fx  Repeat head ct stable  \    BP  Min: 111/62  Max: 171/79  Pulse  Av  Min: 99  Max: 115  Resp  Av.5  Min: 14  Max: 22  Temp  Av.6 °C (97.8 °F)  Min: 36.2 °C (97.1 °F)  Max: 36.8 °C (98.3 °F)  SpO2  Av.5 %  Min: 93 %  Max: 98 %    No data recorded    Recent Labs     22  0420   WBC 7.5 5.0   RBC 3.63* 3.10*   HEMOGLOBIN 11.3* 9.6*   HEMATOCRIT 32.7* 28.5*   MCV 90.1 91.9   MCH 31.1 31.0   MCHC 34.6 33.7   RDW 45.1 46.5   PLATELETCT 146* 114*   MPV 9.0 9.9     Recent Labs     22  0420   SODIUM 139 139   POTASSIUM 4.3 3.7   CHLORIDE 100 104   CO2 28 27   GLUCOSE 205* 142*   BUN 20 21   CREATININE 0.75 0.66   CALCIUM 8.7 8.0*     Recent Labs     22   APTT 28.7   INR 1.05     Recent Labs     22  1934 22  0420   REACTMIN 7.1 2.4*   CLOTKINET 1.4 2.3*   CLOTANGL 72.5 69.2   MAXCLOTS 57.8 50.1*   GTO98RCI 0.0 0.0   PRCINADP 68.0* 75.3*   PRCINAA 95.6* 94.5*       Intake/Output                         22 07 - 22 0659 22 07 - 22 0659     [id number] Total  Total                 Intake    P.O.  --  300 300  --  -- --    P.O. -- 300 300 -- -- --    I.V.  --  671.3 671.3  --  -- --    Volume (mL) (NS infusion) -- 671.3 671.3 -- -- --    Blood  --  217 217  --  -- --    Volume (RELEASE PLATELET PHERESIS) -- 217 217 -- -- --    Total Intake -- 1188.3 1188.3 -- -- --       Output    Urine  --  0 0  --  -- --    Number of Times Incontinent of Urine -- 1 x 1 x -- -- --    Urine Void (mL) -- 0 0 -- -- --    Stool  --  -- --  --  -- --    Number of Times Stooled -- 1 x 1 x -- -- --    Total Output -- 0 0 -- -- --       Net I/O     -- 1188.3 1188.3 -- -- --              Intake/Output Summary (Last 24 hours) at 2022  0722  Last data filed at 8/28/2022 0600  Gross per 24 hour   Intake 1188.25 ml   Output 0 ml   Net 1188.25 ml             Respiratory Therapy Consult   Continuous RT    Pharmacy Consult Request  1 Each PHARMACY TO DOSE    ondansetron  4 mg Q4HRS PRN    ondansetron  4 mg Q4HRS PRN    docusate sodium  100 mg BID    senna-docusate  1 Tablet Nightly    senna-docusate  1 Tablet Q24HRS PRN    polyethylene glycol/lytes  1 Packet BID    magnesium hydroxide  30 mL DAILY    bisacodyl  10 mg Q24HRS PRN    sodium phosphate  1 Each Once PRN    NS   Continuous    oxyCODONE immediate-release  5 mg Q3HRS PRN    Or    oxyCODONE immediate-release  10 mg Q3HRS PRN    Or    HYDROmorphone  0.5 mg Q3HRS PRN    insulin regular  1-6 Units Q6HRS    And    dextrose bolus  25 g Q15 MIN PRN    ambrisentan  10 mg DAILY    ascorbic acid  500 mg DAILY    oy[place] calcium/vitamin D  2 Tablet Q EVENING    clonazePAM  0.5 mg TID    fludrocortisone  0.1 mg DAILY    furosemide  20 mg DAILY    lacosamide  100 mg BID    levothyroxine  137 mcg AM ES    magnesium oxide  400 mg DAILY    metoclopramide  10 mg BID    morphine ER  15 mg Q12HRS    mycophenolate  250 mg BID    omeprazole  40 mg DAILY    potassium chloride SA  20 mEq DAILY    pravastatin  20 mg DAILY    sertraline  150 mg Q EVENING    tacrolimus  2 mg BID    tadalafil  20 mg DAILY    therapeutic multivitamin-minerals  1 Tablet DAILY    promethazine  12.5 mg Q6HRS PRN    levETIRAcetam  750 mg Q12HRS       Assessment and Plan:  Hospital day # 2, right sa/[facility]  chemical prophylactic DVT therapy: No  Start date/time: tbd  Repeat head ct stable  Ok for OR with ortho  repeat head ct in 1 week  neurosurgery will sign off  Cont home aeds                  \

## 2022-08-28 NOTE — PROGRESS NOTES
Mental status adequate for full examination?: Yes    Spine cleared (radiologically and/or clinically): Yes    REVIEW OF SYSTEMS:  Review of Systems   Constitutional:         I hurt all over   HENT: Negative.     Eyes:         Baseline vision    Respiratory:  Negative for shortness of breath.    Cardiovascular:  Negative for chest pain.   Gastrointestinal:  Positive for abdominal pain. Negative for nausea and vomiting.   Musculoskeletal:  Positive for back pain, falls, joint pain, myalgias and neck pain.   Neurological:  Negative for sensory change and speech change.     PHYSICAL EXAMINATION:  /62   Pulse (!) 104   Temp 36.7 °C (98.1 °F) (Temporal)   Resp (!) 23   Wt 52 kg (114 lb 10.2 oz)   LMP 01/03/2000   SpO2 94%   BMI 16.93 kg/m²   Physical Exam  Vitals and nursing note reviewed.   Constitutional:       Comments: Deconditioned and chronically ill in appearance.    HENT:      Head:      Comments: Forehead contusion.     Nose: Nose normal.      Mouth/Throat:      Mouth: Mucous membranes are dry.      Pharynx: Oropharynx is clear.   Eyes:      General:         Right eye: No discharge.         Left eye: No discharge.      Extraocular Movements: Extraocular movements intact.      Pupils: Pupils are equal, round, and reactive to light.   Cardiovascular:      Rate and Rhythm: Tachycardia present.      Pulses: Normal pulses.   Pulmonary:      Effort: Pulmonary effort is normal. No tachypnea, accessory muscle usage or respiratory distress.      Breath sounds: Examination of the right-lower field reveals decreased breath sounds. Examination of the left-lower field reveals decreased breath sounds. Decreased breath sounds present.   Chest:      Chest wall: No tenderness.   Abdominal:      General: There is no distension.      Tenderness: There is no abdominal tenderness. There is no guarding or rebound.   Musculoskeletal:      Right elbow: Tenderness present.      Cervical back: Neck supple.      Right  upper leg: Tenderness present.   Skin:     General: Skin is warm and dry.      Capillary Refill: Capillary refill takes less than 2 seconds.   Neurological:      GCS: GCS eye subscore is 4. GCS verbal subscore is 5. GCS motor subscore is 6.   Psychiatric:         Mood and Affect: Mood normal.       LABORATORY VALUES:  Recent Labs     08/27/22 1827 08/28/22  0420   WBC 7.5 5.0   RBC 3.63* 3.10*   HEMOGLOBIN 11.3* 9.6*   HEMATOCRIT 32.7* 28.5*   MCV 90.1 91.9   MCH 31.1 31.0   MCHC 34.6 33.7   RDW 45.1 46.5   PLATELETCT 146* 114*   MPV 9.0 9.9     Recent Labs     08/27/22 1827 08/28/22  0420   SODIUM 139 139   POTASSIUM 4.3 3.7   CHLORIDE 100 104   CO2 28 27   GLUCOSE 205* 142*   BUN 20 21   CREATININE 0.75 0.66   CALCIUM 8.7 8.0*     Recent Labs     08/27/22 1827 08/28/22  0420   ASTSGOT 38 31   ALTSGPT 34 26   TBILIRUBIN 0.5 0.5   ALKPHOSPHAT 197* 157*   GLOBULIN 2.2 2.2   INR 1.05  --      Recent Labs     08/27/22 1827   APTT 28.7   INR 1.05       IMAGING:  CT-HEAD W/O   Final Result      No significant interval change.         CT-HEAD W/O   Final Result      1.  Right holohemispheric mixed density extra-axial hemorrhage with subacute to chronic components in a parasagittal location and overlying the high right parietal region measuring up to 8 mm in depth. There are also acute components more inferiorly with    a biconvex focal area seen overlying the right posterior parietal region measuring 8 mm in depth. This likely represents subdural hemorrhage however the biconvex focus of hemorrhage could represent an epidural component.      2.  No significant mass effect or midline shift.      3.  Atrophy.      4.  Surgical change involving the left calvarium.      5.  This was discussed with ANH CONLEY at 7:06 PM on 8/27/2022.         DX-KNEE 2- RIGHT   Final Result         1. No acute osseous abnormality but evaluation is limited due to osseous demineralization.      DX-HIP-UNILATERAL-WITH PELVIS-1 VIEW RIGHT    Final Result         Acute angulated fracture of the right femoral neck      DX-CHEST-PORTABLE (1 VIEW)   Final Result      No evidence of acute cardiopulmonary process.      US-TRAUMA VEIN SCREEN LOWER BILAT EXTREMITY    (Results Pending)   DX-ELBOW-COMPLETE 3+ RIGHT    (Results Pending)       All current laboratory studies/radiology exams reviewed: Yes    Medications reconciliation has been reviewed: Yes    Completed Consultations:  Neurosurgery   Orthopedic surgery     Pending Consultations:  Physiatry consult placed.    Newly Identified Diagnoses and Injuries:  Right elbow pain.  Diagnostic imaging pending    RAP Score Total: 11    CAGE Results: negative Blood Alcohol>0.08: not completed     Discussed patient condition with Patient and trauma surgery, Dr. Nir Moreno .

## 2022-08-28 NOTE — ANESTHESIA PREPROCEDURE EVALUATION
Case: [id number] Date/Time: 08/28/22 1159    Procedure: HEMIARTHROPLASTY, HIP (Right: Hip)    Anesthesia type: General    Location: [facility] OR  / SURGERY [facility]    Surgeons: Anupam Ferrer M.D.          Relevant Problems   PULMONARY   (positive) History of Clostridium difficile infection   (positive) History of infection with vancomycin resistant Enterococcus (VRE)      NEURO   (positive) Chronic migraine without aura, intractable, without status migrainosus   (positive) H/O non-ST elevation myocardial infarction (NSTEMI)   (positive) History of Clostridium difficile infection   (positive) History of aspiration pneumonia   (positive) History of infection with vancomycin resistant Enterococcus (VRE)   (positive) History of pancreatitis   (positive) History of pneumonia   (positive) History of small bowel obstruction   (positive) Seizure (HCC)   (positive) Seizures (HCC)      CARDIAC   (positive) Chronic migraine without aura, intractable, without status migrainosus   (positive) Hepatopulmonary syndrome (HCC)   (positive) Mild aortic regurgitation and aortic valve sclerosis   (positive) Mild mitral regurgitation   (positive) Primary pulmonary hypertension (HCC)      GI   (positive) GERD (gastroesophageal reflux disease)   (positive) Hiatal hernia         (positive) Hepatopulmonary syndrome (HCC)      ENDO   (positive) Hypothyroidism      Other   (positive) Splenic lesion   (positive) Splenomegaly       Physical Exam    Airway   Mallampati: II  TM distance: >3 FB  Neck ROM: full       Cardiovascular - normal exam  Rhythm: regular  Rate: normal  (-) murmur     Dental - normal exam           Pulmonary - normal exam  Breath sounds clear to auscultation     Abdominal    Neurological - normal exam         Other findings: Extreme fragility            Anesthesia Plan    ASA 3   ASA physical status 3 criteria: respiratory insufficiency or compromise    Plan - general and peripheral nerve block     Peripheral nerve  block will be post-op pain control  Airway plan will be ETT          Induction: intravenous          Informed Consent:    Anesthetic plan and risks discussed with patient.    Use of blood products discussed with: patient whom.

## 2022-08-28 NOTE — ASSESSMENT & PLAN NOTE
Pt discharged via wheelchair, accompanied by RN. Pt discharged awake and alert, respirations equal and unlabored, skin warm, dry, and intact. Pt and family members' questions and concerns addressed prior to discharge. Right elbow pain on tertiary exam.    Diagnostic imaging negative for fracture.

## 2022-08-28 NOTE — ASSESSMENT & PLAN NOTE
Right holohemispheric subdural hemorrhage with subacute to chronic components in a parasagittal location overlying the right parietal region measuring up to 8 mm in depth.  Follow up head CT stable.  9/4 Follow up CT.  Non-operative management.    Post traumatic pharmacologic seizure prophylaxis for 1 week.  Speech Language Pathology cognitive evaluation.  Maxwell Okeefe MD. Neurosurgeon. [facility]. (sign off 8/28)

## 2022-08-28 NOTE — PROGRESS NOTES
Patient arrived from ED on stretcher on transport monitor with ACLS RN and CCT.    Vital signs       /74  Resp 24  SpO2 98 6L NC  Wt.  Temp 97.7 temporal      4 eye skin assessment with Mary Lou RN and Darrion RN    Rt fore head bruising  Lt forearm bruising  Rt elbow bruising and non blanching redness.  Rt knee bruising, non blanching redness   Lt wrist bruising   Sacrum and coccyx bruising blanchable redness  Rt buttock redness  Non blanching redness behind bilateral ears    Pictures uploaded.      Personal belonging    Purse with purple wallet credit cards, drivers license, pens    Shirt

## 2022-08-28 NOTE — ED NOTES
Pt BIBA from home, per EMS pt was sleeping and rolled off bed landing onto R hip. Pt c/o R hip pain, +shortening noted to R leg. +CMS. Pt also states she hit her head on closet, bruise noted to R forehead (appears old). -LOC. Denies blood thinners. Pt was given 15 mg IM ketamine, 4 mg IM zofran and 2 mg IM versed PTA. Pt is on 6L NC at home.     Pt a/o x4, speaking in full sentences.

## 2022-08-28 NOTE — PROGRESS NOTES
1 unit platelets being infused per Dr. Okeefe order. Plan for repeat TEG at 0500. Consent signed and scanned into epic for blood transfusion.

## 2022-08-28 NOTE — ASSESSMENT & PLAN NOTE
Chronic condition treated with phenergan, zofran, reglan.  Resumed maintenance medication on admission.

## 2022-08-28 NOTE — ASSESSMENT & PLAN NOTE
Multiple over the preceding year while in the care of her   PT/OT ordered to assess mobility needs.    consult placed for safety concerns

## 2022-08-28 NOTE — PROGRESS NOTES
Trauma / Surgical Daily Progress Note    Date of Service  8/28/2022    Chief Complaint  62 y.o. female admitted 8/27/2022 after a fall out of bed. Injuries include TBI and a femur fracture    Interval Events  Hospital day #2  Pt currently requires ICU care and hospital admission  Seen on rounds and discussed with multidisciplinary team  Injuries and physiologic derangements result in significant risk of long term morbidity  Events and interventions include:  Integration of multiple data points and associated complex medical decisions    Supportive care for TBI ongoing  Pain control  Pulmonary toilet  Ortho surgery pending    Review of Systems  Review of Systems   Musculoskeletal:  Positive for arthralgias, back pain and myalgias.      Vital Signs for last 24 hours  Temp:  [36.2 °C (97.1 °F)-37.1 °C (98.8 °F)] 37.1 °C (98.8 °F)  Pulse:  [] 105  Resp:  [14-30] 18  BP: (104-171)/(56-88) 113/60  SpO2:  [93 %-98 %] 93 %    Hemodynamic parameters for last 24 hours       Respiratory Data     Respiration: 18, Pulse Oximetry: 93 %        RUL Breath Sounds: Clear, RML Breath Sounds: Clear, RLL Breath Sounds: Clear, MANJINDER Breath Sounds: Clear, LLL Breath Sounds: Clear    Physical Exam  Physical Exam  Constitutional:       General: She is not in acute distress.     Appearance: She is not toxic-appearing.   HENT:      Head: Normocephalic.      Right Ear: External ear normal.      Left Ear: External ear normal.      Nose: Nose normal.      Mouth/Throat:      Mouth: Mucous membranes are moist.   Eyes:      General: No scleral icterus.     Extraocular Movements: Extraocular movements intact.      Pupils: Pupils are equal, round, and reactive to light.   Cardiovascular:      Rate and Rhythm: Normal rate and regular rhythm.      Pulses: Normal pulses.      Heart sounds: Normal heart sounds.   Pulmonary:      Effort: Pulmonary effort is normal. No respiratory distress.      Breath sounds: Normal breath sounds. No wheezing.    Abdominal:      General: There is no distension.      Palpations: Abdomen is soft.      Tenderness: There is no abdominal tenderness.   Musculoskeletal:         General: No swelling.      Cervical back: Normal range of motion.   Skin:     General: Skin is warm and dry.      Capillary Refill: Capillary refill takes less than 2 seconds.      Coloration: Skin is not jaundiced.   Neurological:      General: No focal deficit present.      Mental Status: She is alert.      Cranial Nerves: No cranial nerve deficit.      Sensory: No sensory deficit.   Psychiatric:      Comments: Unable to assess       Laboratory  Recent Results (from the past 24 hour(s))   CBC WITH DIFFERENTIAL    Collection Time: 08/27/22  6:27 PM   Result Value Ref Range    WBC 7.5 4.8 - 10.8 K/uL    RBC 3.63 (L) 4.20 - 5.40 M/uL    Hemoglobin 11.3 (L) 12.0 - 16.0 g/dL    Hematocrit 32.7 (L) 37.0 - 47.0 %    MCV 90.1 81.4 - 97.8 fL    MCH 31.1 27.0 - 33.0 pg    MCHC 34.6 33.6 - 35.0 g/dL    RDW 45.1 35.9 - 50.0 fL    Platelet Count 146 (L) 164 - 446 K/uL    MPV 9.0 9.0 - 12.9 fL    Neutrophils-Polys 82.60 (H) 44.00 - 72.00 %    Lymphocytes 9.60 (L) 22.00 - 41.00 %    Monocytes 7.20 0.00 - 13.40 %    Eosinophils 0.00 0.00 - 6.90 %    Basophils 0.10 0.00 - 1.80 %    Immature Granulocytes 0.50 0.00 - 0.90 %    Nucleated RBC 0.00 /100 WBC    Neutrophils (Absolute) 6.17 2.00 - 7.15 K/uL    Lymphs (Absolute) 0.72 (L) 1.00 - 4.80 K/uL    Monos (Absolute) 0.54 0.00 - 0.85 K/uL    Eos (Absolute) 0.00 0.00 - 0.51 K/uL    Baso (Absolute) 0.01 0.00 - 0.12 K/uL    Immature Granulocytes (abs) 0.04 0.00 - 0.11 K/uL    NRBC (Absolute) 0.00 K/uL   COMP METABOLIC PANEL    Collection Time: 08/27/22  6:27 PM   Result Value Ref Range    Sodium 139 135 - 145 mmol/L    Potassium 4.3 3.6 - 5.5 mmol/L    Chloride 100 96 - 112 mmol/L    Co2 28 20 - 33 mmol/L    Anion Gap 11.0 7.0 - 16.0    Glucose 205 (H) 65 - 99 mg/dL    Bun 20 8 - 22 mg/dL    Creatinine 0.75 0.50 - 1.40 mg/dL     Calcium 8.7 8.5 - 10.5 mg/dL    AST(SGOT) 38 12 - 45 U/L    ALT(SGPT) 34 2 - 50 U/L    Alkaline Phosphatase 197 (H) 30 - 99 U/L    Total Bilirubin 0.5 0.1 - 1.5 mg/dL    Albumin 3.5 3.2 - 4.9 g/dL    Total Protein 5.7 (L) 6.0 - 8.2 g/dL    Globulin 2.2 1.9 - 3.5 g/dL    A-G Ratio 1.6 g/dL   TROPONIN    Collection Time: 22  6:27 PM   Result Value Ref Range    Troponin T 36 (H) 6 - 19 ng/L   ESTIMATED GFR    Collection Time: 22  6:27 PM   Result Value Ref Range    GFR (CKD-EPI) 90 >60 mL/min/1.73 m 2   APTT    Collection Time: 22  6:27 PM   Result Value Ref Range    APTT 28.7 24.7 - 36.0 sec   PROTHROMBIN TIME (INR)    Collection Time: 22  6:27 PM   Result Value Ref Range    PT 13.6 12.0 - 14.6 sec    INR 1.05 0.87 - 1.13   EKG (NOW)    Collection Time: 22  7:06 PM   Result Value Ref Range    Report       [facility]    Test Date:  2022  Pt Name:    VICK LI             Department: ER  MRN:        [id number]                      Room:       [facility]  Gender:     Female                       Technician: [id number]  :        1960                   Requested By:ANH CONLEY  Order #:    [id number]                    Reading MD: ANH CONLEY D.O.    Measurements  Intervals                                Axis  Rate:       109                          P:          46  WV:         144                          QRS:        29  QRSD:       91                           T:          47  QT:         349  QTc:        471    Interpretive Statements  Sinus tachycardia  Borderline low voltage, extremity leads  Compared to ECG 2022 15:39:58  Sinus rhythm no longer present  Electronically Signed On 2022 19:42:59 PDT by ANH CONLEY D.O.     PLATELET MAPPING WITH BASIC TEG    Collection Time: 22  7:34 PM   Result Value Ref Range    Reaction Time Initial-R 7.1 4.6 - 9.1 min    React Time Initial Hep 7.3 4.3 - 8.3 min    Clot  Kinetics-K 1.4 0.8 - 2.1 min    Clot Angle-Angle 72.5 63.0 - 78.0 degrees    Maximum Clot Strength-MA 57.8 52.0 - 69.0 mm    TEG Functional Fibrinogen(MA) 19.6 15.0 - 32.0 mm    Lysis 30 minutes-LY30 0.0 0.0 - 2.6 %    % Inhibition ADP 68.0 (H) 0.0 - 17.0 %    % Inhibition AA 95.6 (H) 0.0 - 11.0 %    TEG Algorithm Link Algorithm    PLATELETS REQUEST    Collection Time: 08/27/22  9:34 PM   Result Value Ref Range    Component P       P07                 Plts,Pheresis       [id number]   transfused   08/27/22   21:36      Product Type Platelets  Pheresis LR     Dispense Status transfused     Unit Number (Barcoded) [id number]     Product Code (Barcoded) I1929C21     Blood Type (Barcoded) 6200     Component P       P71                 Plts,Pheresis       [id number]   issued       08/28/22   10:20      Product Type Platelets Pheresis LR     Dispense Status issued     Unit Number (Barcoded) [id number]     Product Code (Barcoded) I4490U66     Blood Type (Barcoded) 6200    POCT glucose device results    Collection Time: 08/28/22 12:00 AM   Result Value Ref Range    POC Glucose, Blood 160 (H) 65 - 99 mg/dL   POCT glucose device results    Collection Time: 08/28/22  4:12 AM   Result Value Ref Range    POC Glucose, Blood 121 (H) 65 - 99 mg/dL   CBC with Differential: Tomorrow AM    Collection Time: 08/28/22  4:20 AM   Result Value Ref Range    WBC 5.0 4.8 - 10.8 K/uL    RBC 3.10 (L) 4.20 - 5.40 M/uL    Hemoglobin 9.6 (L) 12.0 - 16.0 g/dL    Hematocrit 28.5 (L) 37.0 - 47.0 %    MCV 91.9 81.4 - 97.8 fL    MCH 31.0 27.0 - 33.0 pg    MCHC 33.7 33.6 - 35.0 g/dL    RDW 46.5 35.9 - 50.0 fL    Platelet Count 114 (L) 164 - 446 K/uL    MPV 9.9 9.0 - 12.9 fL    Neutrophils-Polys 73.00 (H) 44.00 - 72.00 %    Lymphocytes 17.70 (L) 22.00 - 41.00 %    Monocytes 8.50 0.00 - 13.40 %    Eosinophils 0.20 0.00 - 6.90 %    Basophils 0.20 0.00 - 1.80 %    Immature Granulocytes 0.40 0.00 - 0.90 %    Nucleated RBC 0.00 /100 WBC     Neutrophils (Absolute) 3.67 2.00 - 7.15 K/uL    Lymphs (Absolute) 0.89 (L) 1.00 - 4.80 K/uL    Monos (Absolute) 0.43 0.00 - 0.85 K/uL    Eos (Absolute) 0.01 0.00 - 0.51 K/uL    Baso (Absolute) 0.01 0.00 - 0.12 K/uL    Immature Granulocytes (abs) 0.02 0.00 - 0.11 K/uL    NRBC (Absolute) 0.00 K/uL   Comp Metabolic Panel (CMP): Tomorrow AM    Collection Time: 08/28/22  4:20 AM   Result Value Ref Range    Sodium 139 135 - 145 mmol/L    Potassium 3.7 3.6 - 5.5 mmol/L    Chloride 104 96 - 112 mmol/L    Co2 27 20 - 33 mmol/L    Anion Gap 8.0 7.0 - 16.0    Glucose 142 (H) 65 - 99 mg/dL    Bun 21 8 - 22 mg/dL    Creatinine 0.66 0.50 - 1.40 mg/dL    Calcium 8.0 (L) 8.5 - 10.5 mg/dL    AST(SGOT) 31 12 - 45 U/L    ALT(SGPT) 26 2 - 50 U/L    Alkaline Phosphatase 157 (H) 30 - 99 U/L    Total Bilirubin 0.5 0.1 - 1.5 mg/dL    Albumin 3.2 3.2 - 4.9 g/dL    Total Protein 5.4 (L) 6.0 - 8.2 g/dL    Globulin 2.2 1.9 - 3.5 g/dL    A-G Ratio 1.5 g/dL   PLATELET MAPPING WITH BASIC TEG    Collection Time: 08/28/22  4:20 AM   Result Value Ref Range    Reaction Time Initial-R 2.4 (L) 4.6 - 9.1 min    React Time Initial Hep 2.2 (L) 4.3 - 8.3 min    Clot Kinetics-K 2.3 (H) 0.8 - 2.1 min    Clot Angle-Angle 69.2 63.0 - 78.0 degrees    Maximum Clot Strength-MA 50.1 (L) 52.0 - 69.0 mm    TEG Functional Fibrinogen(MA) 15.4 15.0 - 32.0 mm    Lysis 30 minutes-LY30 0.0 0.0 - 2.6 %    % Inhibition ADP 75.3 (H) 0.0 - 17.0 %    % Inhibition AA 94.5 (H) 0.0 - 11.0 %    TEG Algorithm Link Algorithm    ESTIMATED GFR    Collection Time: 08/28/22  4:20 AM   Result Value Ref Range    GFR (CKD-EPI) 99 >60 mL/min/1.73 m 2   POCT glucose device results    Collection Time: 08/28/22 11:03 AM   Result Value Ref Range    POC Glucose, Blood 143 (H) 65 - 99 mg/dL       Fluids    Intake/Output Summary (Last 24 hours) at 8/28/2022 1136  Last data filed at 8/28/2022 1000  Gross per 24 hour   Intake 1488.25 ml   Output 0 ml   Net 1488.25 ml       Core Measures & Quality  Metrics  Labs reviewed and Radiology images reviewed  Holbrook catheter: No Holbrook      DVT Prophylaxis: Contraindicated - High bleeding risk  DVT prophylaxis - mechanical: SCDs  Ulcer prophylaxis: Yes      RAP Score Total: 11  CAGE Results: negative Blood Alcohol>0.08: not completed     Assessment/Plan  Right elbow pain- (present on admission)  Assessment & Plan  Right elbow pain on tertiary exam.  Diagnostic imaging pending.     Platelet dysfunction (HCC)- (present on admission)  Assessment & Plan  Marked AA pathway platelet inhibition on admission platelet mapping thromoboelastography.  One unit of platelets administered.  8/28 Follow up platelet mapping with persistent AA inhibition. One unit of platelets administered.     Subdural hematoma, post-traumatic (HCC)- (present on admission)  Assessment & Plan  Right holohemispheric subdural hemorrhage with subacute to chronic components in a parasagittal location overlying the right parietal region measuring up to 8 mm in depth.  Follow up head CT stable.  9/4 Follow up CT.  Non-operative management.    Post traumatic pharmacologic seizure prophylaxis for 1 week.  Speech Language Pathology cognitive evaluation.  Maxwell Okeefe MD. Neurosurgeon. [facility]. (sign off 8/28)    Displaced fracture of right femoral neck (HCC)- (present on admission)  Assessment & Plan  Acute angulated fracture of the right femoral neck.   Plan for hemiarthroplasty in AM.  Weight bearing status - Nonweightbearing RLE.  Anupam Ferrer MD. Orthopedic Surgery.    Frequent falls- (present on admission)  Assessment & Plan  Multiple over the preceding year while in the care of her   PT/OT ordered to assess mobility needs.   consult placed for safety concerns     Contraindication to deep vein thrombosis (DVT) prophylaxis- (present on admission)  Assessment & Plan  Prophylactic anticoagulation for thrombotic prevention initially contraindicated secondary to  elevated bleeding risk.  8/28 Trauma surveillance venous duplex scanning ordered.     Primary pulmonary hypertension (HCC)- (present on admission)  Assessment & Plan  Chronic condition treated with tadalafil, ambrisentan.  Resumed maintenance medication on admission.   3/2022 ECHO without evidence of pulmonary hypertension, normal right atrial and ventricular pressures.     Chronic nausea- (present on admission)  Assessment & Plan  Chronic condition treated with phenergan, zofran, reglan.  Resumed maintenance medication on admission.     GERD (gastroesophageal reflux disease)- (present on admission)  Assessment & Plan  Chronic condition treated with Omeprazole.  Resumed maintenance medication on admission.     Chronic pain syndrome- (present on admission)  Assessment & Plan  Chronic condition treated with MS Contin and Oxycodone.  Resumed maintenance medication on admission.     Other hyperlipidemia- (present on admission)  Assessment & Plan  Chronic condition treated with pravastatin.  Resumed maintenance medication on admission.     History of liver transplant (HCC)- (present on admission)  Assessment & Plan  Admit ALP mildly elevated, other LFTs normal  Chronic condition treated with tacrolimus, cellcept, furosemide, fludrocortisone, and K-dur  Resumed maintenance medication on admission.     Discharge planning issues- (present on admission)  Assessment & Plan  Date of admission: 8/27/2022.  Transfer orders from SICU to be determined.  8/28 Rehab referral.  Cleared for discharge: No.  Discharge delayed: No.  Discharge date: tbd.    Trauma- (present on admission)  Assessment & Plan  Fell off a bed. Unknown loss of consciousness.  Non Trauma Activation.  Andriy Robertson MD. Trauma Surgery.     DUC (generalized anxiety disorder)- (present on admission)  Assessment & Plan  Chronic condition treated with clonazepam.  Resumed maintenance medication on admission.     Hypothyroidism- (present on admission)  Assessment  & Plan  Chronic condition treated with Levothyroxine.  Resumed maintenance medication on admission.         Discussed patient condition with RN, RT, Pharmacy, and Patient.

## 2022-08-28 NOTE — PROGRESS NOTES
Right displaced femoral neck fracture   Will plan for hemiarthroplasty tomorrow am if cleared  NPO at midnight        Anupam Ferrer MD  Orthopedic Trauma Surgery

## 2022-08-28 NOTE — ANESTHESIA POSTPROCEDURE EVALUATION
Patient: Roxana Cuba    Procedure Summary     Date: 08/28/22 Room / Location: [place] [address] / SURGERY [facility]    Anesthesia Start: 1323 Anesthesia Stop: 1524    Procedure: HEMIARTHROPLASTY, HIP (Right: Hip) Diagnosis: (Right displaced femoral neck fracture )    Surgeons: Anupam Ferrer M.D. Responsible Provider: Nirmal Chaparro M.D.    Anesthesia Type: general, peripheral nerve block ASA Status: 3          Final Anesthesia Type: general, peripheral nerve block  Last vitals  BP   Blood Pressure: 124/63, NIBP: 119/62    Temp   36.1 °C (96.9 °F)    Pulse   (!) 102   Resp   19    SpO2   98 %      Anesthesia Post Evaluation    Patient location during evaluation: PACU  Patient participation: complete - patient participated  Level of consciousness: awake and alert  Pain score: 1    Airway patency: patent  Anesthetic complications: no  Cardiovascular status: hemodynamically stable  Respiratory status: acceptable  Hydration status: euvolemic    PONV: none          No notable events documented.     Nurse Pain Score: 4 (NPRS)

## 2022-08-28 NOTE — PROGRESS NOTES
I was paged at 730pm to consult on this patient. I arrived at the patient's patient bedside at 910pm

## 2022-08-28 NOTE — ED PROVIDER NOTES
ED Provider  Scribed for Waylon Jonas D.O. by Vy Forman. 8/27/2022  6:12 PM    Means of arrival:Ambulance  History obtained from:Patient  History limited by: None    CHIEF COMPLAINT  Chief Complaint   Patient presents with    Hip Pain     right    GLF     Rolled out of bed       HPI  Roxana Cuba is a 62 y.o. female who presents for evaluation of ground level fall onset prior to arrival. Patient reports she was sleeping in her bed when she rolled off her bed. Roxana hit her right hip and right knee. She remembers waking up on the floor. There is questionable loss of consciousness. Patient has been unable to walk since the incident. The admits to associated symptoms of right knee pain and right hip pain, but denies headache. No alleviating factors were reported. Patient lives with her .     REVIEW OF SYSTEMS  See [facility] [place] for further details. All other systems are negative.   Pertinent positives include right hip and right knee. Pertinent negatives include no headache.      PAST MEDICAL HISTORY   has a past medical history of Anemia, Anesthesia, Breath shortness, Bronchitis ( ), Cardiomegaly, Cataract, Chronic fatigue and malaise, Chronic obstructive pulmonary disease (HCC), Cirrhosis (HCC) (12/2011), CKD (chronic kidney disease) stage 3, GFR 30-59 ml/min (HCC), Diabetes (HCC), Emphysema of lung (HCC), Fracture of left foot, GERD (gastroesophageal reflux disease), H/O Clostridium difficile infection (02/17/2017), Heart burn, Hemorrhagic disorder (HCC), Hiatus hernia syndrome, High cholesterol, Hypertension, Hypothyroid, Indigestion, Jaundice (2009), Liver transplanted (HCC), Low back pain (02/17/2017), Memory difficulty, Mild aortic regurgitation and aortic valve sclerosis ( ), On home oxygen therapy, Platelet disorder (HCC), Pneumonia, Psychiatric disorder, Pulmonary hypertension (HCC), S/P cholecystectomy, Seizure ([facility]) (05/29/2022), Shoulder pain, Small bowel obstruction (HCC), Splenomegaly, and VRE  (vancomycin-resistant Enterococci).    SOCIAL HISTORY  Social History     Tobacco Use    Smoking status: Never    Smokeless tobacco: Never    Tobacco comments:     avoid all tobacco products   Vaping Use    Vaping Use: Never used   Substance and Sexual Activity    Alcohol use: Not Currently     Alcohol/week: 0.0 oz    Drug use: No    Sexual activity: None       SURGICAL HISTORY   has a past surgical history that includes anesth,nose,sinus surgery; makayla by laparoscopy (09/19/2009); exam under anesthesia (11/11/2009); hysterectomy, total abdominal; gastroscopy-endo (03/12/2010); bronchoscopy-endo (05/29/2012); bronchoscopy-endo (06/19/2012); sigmoidoscopy flex (09/26/2012); recovery (02/27/2013); bronchoscopy-endo (11/15/2013); recovery (01/21/2014); recovery (03/24/2014); hemorrhoidectomy (11/11/2009); gastroscopy (09/28/2012); carpal tunnel release; bone marrow aspiration (12/31/2012); bone marrow biopsy, ndl/trocar (12/31/2012); recovery (03/31/2014); other abdominal surgery (12/2011); bone marrow aspiration (03/16/2015); bone marrow biopsy, ndl/trocar (03/16/2015); lung biopsy open (11/2016); tonsillectomy; other abdominal surgery (12/2015); breast biopsy (Left, 02/21/2017); colonoscopy (N/A, 03/27/2017); gastroscopy (N/A, 03/27/2017); gastric bypass laparoscopic (2018); hernia repair; makayla by laparoscopy; mammoplasty reduction; panniculectomy (12/11/2017); other surgical procedure; turbinate reduction (Bilateral, 10/04/2018); nasal reconstruction (Bilateral, 10/04/2018); ventral hernia repair robotic xi (N/A, 11/12/2018); craniotomy (Left, 08/04/2021); upper gi endoscopy,diagnosis (N/A, 02/03/2022); colonoscopy,diagnostic (N/A, 6/21/2022); and upper gi endoscopy,diagnosis (N/A, 6/21/2022).    CURRENT MEDICATIONS  Home Medications       Reviewed by Colleen Schneider (Pharmacy Tech) on 08/27/22 at 2038  Med List Status: Complete     Medication Last Dose Status   albuterol (PROAIR HFA) 108 (90 Base) MCG/ACT Aero  Soln inhalation aerosol > 1 week Active   ambrisentan (LETAIRIS) 10 MG tablet 8/27/2022 Active   Ascorbic Acid (VITAMIN C) 500 MG Chew Tab 8/27/2022 Active   CALCIUM-VITAMIN D PO 8/27/2022 Active   clonazePAM (KLONOPIN) 0.5 MG Tab 8/27/2022 Active   fludrocortisone (FLORINEF) 0.1 MG Tab 8/27/2022 Active   furosemide (LASIX) 20 MG Tab 8/27/2022 Active   lacosamide (VIMPAT) 100 MG Tab tablet 8/27/2022 Active   levetiracetam (KEPPRA) 750 MG tablet 8/27/2022 Active   levothyroxine (SYNTHROID) 137 MCG Tab 8/27/2022 Active   magnesium oxide (MAG-OX) 400 MG Tab tablet 8/27/2022 Active   metoclopramide (REGLAN) 10 MG Tab 8/27/2022 Active   morphine ER (MS CONTIN) 15 MG Tab CR tablet 8/27/2022 Active   mycophenolate (CELLCEPT) 250 MG Cap 8/27/2022 Active   omeprazole (PRILOSEC) 40 MG delayed-release capsule 8/27/2022 Active   ondansetron (ZOFRAN ODT) 4 MG TABLET DISPERSIBLE 8/26/2022 Active   oxyCODONE immediate release (ROXICODONE) 10 MG immediate release tablet 8/27/2022 Active   potassium chloride SA (KDUR) 20 MEQ Tab CR 8/27/2022 Active   pravastatin (PRAVACHOL) 20 MG Tab 8/27/2022 Active   promethazine (PHENERGAN) 25 MG Suppos > 1 week Active   sertraline (ZOLOFT) 100 MG Tab 8/26/2022 Active   tacrolimus (PROGRAF) 1 MG Cap 8/27/2022 Active   tadalafil (CIALIS) 20 MG tablet 8/27/2022 Active   therapeutic multivitamin-minerals (THERAGRAN-M) Tab 8/27/2022 Active                    ALLERGIES  Allergies   Allergen Reactions    Nsaids Unspecified     Can not take due to hx of liver transplant     Rhubarb Anaphylaxis    Organic Nitrates Unspecified     Nitroglycerin products should be avoided with the use of PDE-5 inhibitors as the combination can result in severe hypotension.  RD clarified with pt: Nitrates in food are okay and pt does not want nitrates to be listed as food allergy. Pt only avoids medications with nitrates.     Other Drug      Any medication that may interact with cyclosporine, tacrolimus, sirolimus, or  prograf (due to hx of liver transplant)     Tylenol      Liver transplant    Vancomycin      Red man syndrome       PHYSICAL EXAM  VITAL SIGNS: BP (!) 171/79   Pulse (!) 111   Temp 36.7 °C (98 °F) (Temporal)   Resp 16   Wt 49.9 kg (110 lb)   LMP 01/03/2000   SpO2 98%   BMI 16.24 kg/m²   Constitutional: Alert in no apparent distress.  HENT: Right forehead ecchymosis/hematoma, mucous membranes are moist  Eyes: Conjunctiva normal, Non-icteric.   Neck: Normal range of motion, No tenderness, Supple.  Lymphatic: No lymphadenopathy noted.   Cardiovascular: Regular rate and rhythm, no murmurs.   Thorax & Lungs: Normal breath sounds, No respiratory distress, No wheezing, No chest tenderness.   Abdomen: Bowel sounds normal, Soft, No tenderness, No masses, No pulsatile masses. No peritoneal signs.  Skin: Warm, Dry, normal color.   Back: No bony tenderness, No CVA tenderness.   Extremities: No edema, No tenderness, No cyanosis  Musculoskeletal: Lip hip swelling, left knee with swelling and tenderness, range of motion tender of left hip, leg externally rotated.   Neurologic: Alert and oriented x4, Normal motor function, Normal sensory function, No focal deficits noted.   Psychiatric: Affect normal, Judgment normal, Mood normal.     DIAGNOSTIC STUDIES / PROCEDURES    EKG  12 Lead EKG interpreted by me shown below.      LABS  Results for orders placed or performed during the hospital encounter of 08/27/22   CBC WITH DIFFERENTIAL   Result Value Ref Range    WBC 7.5 4.8 - 10.8 K/uL    RBC 3.63 (L) 4.20 - 5.40 M/uL    Hemoglobin 11.3 (L) 12.0 - 16.0 g/dL    Hematocrit 32.7 (L) 37.0 - 47.0 %    MCV 90.1 81.4 - 97.8 fL    MCH 31.1 27.0 - 33.0 pg    MCHC 34.6 33.6 - 35.0 g/dL    RDW 45.1 35.9 - 50.0 fL    Platelet Count 146 (L) 164 - 446 K/uL    MPV 9.0 9.0 - 12.9 fL    Neutrophils-Polys 82.60 (H) 44.00 - 72.00 %    Lymphocytes 9.60 (L) 22.00 - 41.00 %    Monocytes 7.20 0.00 - 13.40 %    Eosinophils 0.00 0.00 - 6.90 %     Basophils 0.10 0.00 - 1.80 %    Immature Granulocytes 0.50 0.00 - 0.90 %    Nucleated RBC 0.00 /100 WBC    Neutrophils (Absolute) 6.17 2.00 - 7.15 K/uL    Lymphs (Absolute) 0.72 (L) 1.00 - 4.80 K/uL    Monos (Absolute) 0.54 0.00 - 0.85 K/uL    Eos (Absolute) 0.00 0.00 - 0.51 K/uL    Baso (Absolute) 0.01 0.00 - 0.12 K/uL    Immature Granulocytes (abs) 0.04 0.00 - 0.11 K/uL    NRBC (Absolute) 0.00 K/uL   COMP METABOLIC PANEL   Result Value Ref Range    Sodium 139 135 - 145 mmol/L    Potassium 4.3 3.6 - 5.5 mmol/L    Chloride 100 96 - 112 mmol/L    Co2 28 20 - 33 mmol/L    Anion Gap 11.0 7.0 - 16.0    Glucose 205 (H) 65 - 99 mg/dL    Bun 20 8 - 22 mg/dL    Creatinine 0.75 0.50 - 1.40 mg/dL    Calcium 8.7 8.5 - 10.5 mg/dL    AST(SGOT) 38 12 - 45 U/L    ALT(SGPT) 34 2 - 50 U/L    Alkaline Phosphatase 197 (H) 30 - 99 U/L    Total Bilirubin 0.5 0.1 - 1.5 mg/dL    Albumin 3.5 3.2 - 4.9 g/dL    Total Protein 5.7 (L) 6.0 - 8.2 g/dL    Globulin 2.2 1.9 - 3.5 g/dL    A-G Ratio 1.6 g/dL   TROPONIN   Result Value Ref Range    Troponin T 36 (H) 6 - 19 ng/L   ESTIMATED GFR   Result Value Ref Range    GFR (CKD-EPI) 90 >60 mL/min/1.73 m 2   APTT   Result Value Ref Range    APTT 28.7 24.7 - 36.0 sec   PROTHROMBIN TIME (INR)   Result Value Ref Range    PT 13.6 12.0 - 14.6 sec    INR 1.05 0.87 - 1.13   EKG (NOW)   Result Value Ref Range    Report       [facility] Emergency [facility].    Test Date:  2022  Pt Name:    VICK LI             Department: ER  MRN:        [id number]                      Room:       BL 21  Gender:     Female                       Technician: [id number]  :        1960                   Requested By:ANH CONLEY  Order #:    [id number]                    Reading MD: ANH CONLEY D.O.    Measurements  Intervals                                Axis  Rate:       109                          P:          46  NJ:         144                          QRS:        29  QRSD:       91                            T:          47  QT:         349  QTc:        471    Interpretive Statements  Sinus tachycardia  Borderline low voltage, extremity leads  Compared to ECG 03/19/2022 15:39:58  Sinus rhythm no longer present  Electronically Signed On 8- 19:42:59 PDT by ANH CONLEY D.O.       *Note: Due to a large number of results and/or encounters for the requested time period, some results have not been displayed. A complete set of results can be found in Results Review.       All labs reviewed by me.    RADIOLOGY  CT-HEAD W/O   Final Result      1.  Right holohemispheric mixed density extra-axial hemorrhage with subacute to chronic components in a parasagittal location and overlying the high right parietal region measuring up to 8 mm in depth. There are also acute components more inferiorly with    a biconvex focal area seen overlying the right posterior parietal region measuring 8 mm in depth. This likely represents subdural hemorrhage however the biconvex focus of hemorrhage could represent an epidural component.      2.  No significant mass effect or midline shift.      3.  Atrophy.      4.  Surgical change involving the left calvarium.      5.  This was discussed with ANH CONLEY at 7:06 PM on 8/27/2022.         DX-KNEE 2- RIGHT   Final Result         1. No acute osseous abnormality but evaluation is limited due to osseous demineralization.      DX-HIP-UNILATERAL-WITH PELVIS-1 VIEW RIGHT   Final Result         Acute angulated fracture of the right femoral neck      DX-CHEST-PORTABLE (1 VIEW)   Final Result      No evidence of acute cardiopulmonary process.      US-TRAUMA VEIN SCREEN LOWER BILAT EXTREMITY    (Results Pending)   CT-HEAD W/O    (Results Pending)     The radiologist's interpretations of all radiological studies have been reviewed by me.    Films have been independently by me      COURSE  Pertinent Labs & Imaging studies reviewed. (See chart for details)      6:12 PM -  Patient seen and examined at bedside. Discussed plan of care. The patient will be medicated with morphine injection 4 mg and Zofran injection 4 mg. Ordered for DX-knee, CT-head, DX-hip, DX-chest, EKG, Troponin, CMP, and CBC with diff to evaluate her symptoms.     6:50 PM - Paged Dr. Okeefe.     7:25 PM I discussed the patient's case and the above findings with Dr. Okeefe (Neurology) who recommends trauma admits the patient. Ordered INR, APTT, and Platelet mapping.     7:26 - Paged Ortho.     7:28 PM - Paged Trauma.     7:30 PM - Patient will be treated with morphine injection 10 mg.     7:33 PM I discussed the patient's case and the above findings with Dr. Owusu (Trauma) who will kindly evaluate the patient.      7:39 PM -  I discussed the patient's case and the above findings with Dr. Ferrer (Ortho) who will see the patient.      The patient remains critically ill.  Critical care time = 30 minutes in directly providing and coordinating critical care and extensive data review.  No time overlap and excludes procedures.       MEDICAL DECISION MAKING  This is a 62 y.o. female who presents with a ground-level fall versus syncope.  She was in her closet and woke up on the floor.  She had hematoma to the right head.  She does not remember the incident does not remember any prodrome of symptoms prior to the being found on the floor.    She is complaining primarily of right hip pain.  She is a chronic pain patient on high-dose medications.  She has received 2 doses of pain medication here which does improve the pain except movement does increase the pain.    CT of the head shows a subacute versus acute subdural hematoma.  This is rated as a vague 1, I spoke with Vijay's for consultation and he will see the patient here.  I spoke with trauma surgery and the patient will be admitted to trauma surgery.  Talked with  for evaluation of the right femoral neck fracture and that he will see the patient also.    Patient  is a GCS of 15.  He is neurologically intact at this time.  She will be admitted for further evaluation and treatment    Critical care time 30 minutes    DISPOSITION:  Patient will be hospitalized by Dr. Owusu in guarded condition.    FINAL IMPRESSION  1. Fall, initial encounter    2. Closed displaced fracture of right femoral neck (HCC)    3. Subdural hematoma (HCC)         Vy BRODY (Scribe), am scribing for, and in the presence of, Waylon Jonas D.O..    Electronically signed by: Vy Forman (Scribe), 8/27/2022    Waylon BRODY D.O. personally performed the services described in this documentation, as scribed by Vy Forman in my presence, and it is both accurate and complete.    The note accurately reflects work and decisions made by me.  Waylon Jonas D.O.  8/27/2022  8:59 PM

## 2022-08-28 NOTE — OP REPORT
DATE OF OPERATION: 8/28/2022     PREOPERATIVE DIAGNOSIS: Displaced fracture right femoral neck    POSTOPERATIVE DIAGNOSIS: Same    PROCEDURE PERFORMED: Right hip hemiarthroplasty (press-fit)    SURGEON: Anupam Ferrer M.D.     ASSISTANT: Calvin Merrill PA-C please note he is medically necessary for the case    ANESTHESIA: General    SPECIMEN: None    ESTIMATED BLOOD LOSS: 50 mL    IMPLANTS: Malick press-fit hemiarthroplasty 50 mm head, 6 stem, -3 neck      INDICATIONS: The patient is a 62 y.o. female who presented with above-mentioned injury.  I discussed the risks and benefits of the procedure which include but are not limited to risks of infection, wound healing complication, neurovascular injury, pain, malunion, non-union, malrotation, and the medical risks of anesthesia including MI, stroke, and death.  Alternatives to surgery were also discussed, including non-operative management, which I did not recommend.  The patient was in agreement with the plan to proceed, and the informed consent was signed and documented.  I met with the patient pre-operatively and marked the operative extremity with their agreement.  We proceeded to the operating room.     DESCRIPTION OF PROCEDURE:  Patient was seen in the preoperative holding area on the day of surgery. The operative site was marked with my initials.  she was taken to the operating room and placed lateral on the operative table.  Anesthesia was induced.  The operative extremity was prepped and draped in the normal sterile fashion.  Operative pause was conducted and the correct patient, site, side, procedure, and surgeon's initials on extremity were identified.  Posterior approach to the hip was performed.  Scant was incised with a knife followed by Bovie cautery Bovie cautery down to the fascia.  Fascia was cleared with the Gonzalez elevator.  The fascia was then incised in line with the skin incision.  Immediately noted large area of hematoma with significant  destruction of posterior capsule and short external rotators.  The bursal layer was then excised with a rongeur.  There was a fair amount of clot that was also excised with comminuted bone fragments.  The head was identified and removed with a power corkscrew.  This was then measured to be a 50 mm head.  50 mm head trial was then placed and deemed to have good suction and good fit.  The fracture was then measured to be approximately 1 cm proximal from lesser trochanter.  There is not sufficient neck left to warrant a neck cut.  This point time the proximal femur was elevated and box osteotome was utilized.  This was followed by canal finder.  Sequential broaching for press-fit stem then ensued making sure to sufficiently Antivert and lateralized.  Sequential reaming/broaching continued until a size 6 was deemed to have appropriate fit.  Trial head and neck were then placed and the hip was reduced.  Overall stability was then noted with hip flexion internal rotation and adduction.  This was felt to be extremely stable.  Trials were then removed.  Final implant was then placed again noted to be extremely stable.  Wounds were then thoroughly irrigated sterile saline.  1 g vancomycin was placed on the wound.  The posterior capsule and external rotators from what was left of them were repaired with Ethibond sutures.  Of note the sciatic nerve was identified and deemed to be intact but surrounding soft tissue was sufficiently injured.  Following capsule on external rotator repair the fascia was then closed with 0 strata fix followed by 2-0 Vicryl for subcutaneous and staples for skin.  A Prevena suction dressing was placed.  The patient was awoken in stable condition taken to PACU.    POSTOPERATIVE PLAN: Weightbearing as tolerated right lower extremity with posterior hip precautions weight bearing.  Mobilize with physical and occupational therapies.  DVT prophylaxis with SCDs and Lovenox until mobilizing independently and  then can be switched to aspirin for 4 weeks.  The patient will follow up in clinic in 2 weeks to check wounds and remove sutures/staples.      ____________________________________   Anupam Ferrer M.D.   DD: 8/28/2022  3:06 PM

## 2022-08-28 NOTE — ASSESSMENT & PLAN NOTE
Acute angulated fracture of the right femoral neck.   8/28 Right hip hemiarthroplasty  Weight bearing status - Weightbearing as tolerated RLE.  Anupam Ferrer MD. Orthopedic Surgery.

## 2022-08-28 NOTE — ANESTHESIA TIME REPORT
Anesthesia Start and Stop Event Times     Date Time Event    8/28/2022 1322 Ready for Procedure     1323 Anesthesia Start     1524 Anesthesia Stop        Responsible Staff  08/28/22    Name Role Begin End    Nirmal Chaparro M.D. Anesth 1323 1524        Overtime Reason:  no overtime (within assigned shift)    Comments:

## 2022-08-29 PROBLEM — G93.40 ENCEPHALOPATHY ACUTE: Status: ACTIVE | Noted: 2022-01-01

## 2022-08-29 PROBLEM — G40.909 SEIZURE DISORDER (HCC): Status: ACTIVE | Noted: 2022-01-01

## 2022-08-29 NOTE — PROGRESS NOTES
Neurosurgery Progress Note    Subjective:  No events  C/o HA    Exam:    A&O x3, GCS 15  PERRL, EOMI with sig prompting  Face symm, tongue midline  HOPE with FS, except right prox le secondary to femur fx    \    BP  Min: 103/63  Max: 148/73  Pulse  Av.3  Min: 100  Max: 116  Resp  Av.9  Min: 13  Max: 34  Temp  Av.8 °C (98.2 °F)  Min: 36.1 °C (96.9 °F)  Max: 37.2 °C (98.9 °F)  SpO2  Av.8 %  Min: 93 %  Max: 99 %    No data recorded    Recent Labs     22  0445   WBC 7.5 5.0 6.6   RBC 3.63* 3.10* 2.46*   HEMOGLOBIN 11.3* 9.6* 7.7*   HEMATOCRIT 32.7* 28.5* 23.3*   MCV 90.1 91.9 94.7   MCH 31.1 31.0 31.3   MCHC 34.6 33.7 33.0*   RDW 45.1 46.5 48.2   PLATELETCT 146* 114* 164   MPV 9.0 9.9 9.6       Recent Labs     220 22  0445   SODIUM 139 139 135   POTASSIUM 4.3 3.7 4.5   CHLORIDE 100 104 100   CO2 28 27 24   GLUCOSE 205* 142* 167*   BUN 20 21 17   CREATININE 0.75 0.66 0.60   CALCIUM 8.7 8.0* 7.3*       Recent Labs     22   APTT 28.7   INR 1.05       Recent Labs     22  1934 22  0420   REACTMIN 7.1 2.4*   CLOTKINET 1.4 2.3*   CLOTANGL 72.5 69.2   MAXCLOTS 57.8 50.1*   SOS19IXR 0.0 0.0   PRCINADP 68.0* 75.3*   PRCINAA 95.6* 94.5*         Intake/Output                         22 0700 - 22 0659 22 0700 - 22 0659     4216-2015 3812-4970 Total 2019-9935 9430-4839 Total                 Intake    P.O.  --  430 430  --  -- --    P.O. -- 430 430 -- -- --    I.V.  2325  975 3300  --  -- --    Volume (mL) (NS infusion)  -- -- --    Volume (mL) (Lactated Ringers) 1000 -- 1000 -- -- --    Volume (mL) (electrolyte-A (PLASMALYTE-A) infusion) 500 -- 500 -- -- --    Blood  263.3  -- 263.3  --  -- --    Volume (RELEASE PLATELET PHERESIS) 263.3 -- 263.3 -- -- --    Total Intake 2588.3 1405 3993.3 -- -- --       Output    Urine  --  -- --  --  -- --    Number of Times Voided 1 x 3 x 4 x -- -- --     Number of Times Incontinent of Urine -- 3 x 3 x -- -- --    Blood  250  -- 250  --  -- --    Est. Blood Loss 250 -- 250 -- -- --    Total Output 250 -- 250 -- -- --       Net I/O     2338.3 1405 3743.3 -- -- --              Intake/Output Summary (Last 24 hours) at 8/29/2022 0800  Last data filed at 8/29/2022 0600  Gross per 24 hour   Intake 3843.33 ml   Output 250 ml   Net 3593.33 ml               insulin regular  1-6 Units 4X/DAY ACHS    And    dextrose bolus  25 g Q15 MIN PRN    sodium phosphate ivpb  30 mmol Once    magnesium sulfate  2 g Once    MD Alert...Total Body Iron Replacement per Pharmacy   PRN    Respiratory Therapy Consult   Continuous RT    Pharmacy Consult Request  1 Each PHARMACY TO DOSE    ondansetron  4 mg Q4HRS PRN    ondansetron  4 mg Q4HRS PRN    docusate sodium  100 mg BID    senna-docusate  1 Tablet Nightly    senna-docusate  1 Tablet Q24HRS PRN    polyethylene glycol/lytes  1 Packet BID    magnesium hydroxide  30 mL DAILY    bisacodyl  10 mg Q24HRS PRN    sodium phosphate  1 Each Once PRN    NS   Continuous    oxyCODONE immediate-release  5 mg Q3HRS PRN    Or    oxyCODONE immediate-release  10 mg Q3HRS PRN    Or    HYDROmorphone  0.5 mg Q3HRS PRN    ambrisentan  10 mg DAILY    ascorbic acid  500 mg DAILY    oyster shell calcium/vitamin D  2 Tablet Q EVENING    clonazePAM  0.5 mg TID    fludrocortisone  0.1 mg DAILY    furosemide  20 mg DAILY    lacosamide  100 mg BID    levothyroxine  137 mcg AM ES    magnesium oxide  400 mg DAILY    metoclopramide  10 mg BID    morphine ER  15 mg Q12HRS    mycophenolate  250 mg BID    omeprazole  40 mg DAILY    potassium chloride SA  20 mEq DAILY    pravastatin  20 mg DAILY    sertraline  150 mg Q EVENING    tacrolimus  2 mg BID    tadalafil  20 mg DAILY    therapeutic multivitamin-minerals  1 Tablet DAILY    promethazine  12.5 mg Q6HRS PRN    levETIRAcetam  750 mg Q12HRS       Assessment and Plan:  Hospital day # 3 right sa/[facility]  chemical prophylactic  DVT therapy: No  Start date/time: tbd    Repeat head ct stable  repeat head ct in 1 week  neurosurgery will sign off  Cont home aeds                  \

## 2022-08-29 NOTE — ASSESSMENT & PLAN NOTE
She was admitted to the trauma service  She may be an excellent candidate for inpatient rehab prior to discharge home as she had PT/OT needs and is very medically complex with active, acute problems (liver transplant, subdural hematoma, acute encephalopathy, pain control).

## 2022-08-29 NOTE — PROGRESS NOTES
Orthopaedic Progress Note    Interval changes:  Patient doing well post op  Right hip prevena vac dressing in place without leak  DNVI    ROS - Patient denies any new issues.  Pain well controlled.    BP (!) 141/67   Pulse (!) 103   Temp 37.3 °C (99.1 °F) (Temporal)   Resp (!) 117   Wt 53 kg (116 lb 13.5 oz)   SpO2 99%       Patient seen and examined  No acute distress  Breathing non labored  RRR  Right hip surgical vac dressing is clean, dry, intact, and without leak. Patient clearly fires tibialis anterior, EHL, and gastrocnemius/soleus. Sensation is intact to light touch throughout superficial peroneal, deep peroneal, tibial, saphenous, and sural nerve distributions. Strong and palpable 2+ dorsalis pedis and posterior tibial pulses with capillary refill less than 2 seconds. No lower leg tenderness or discomfort.     Recent Labs     08/27/22  1827 08/28/22  0420 08/29/22  0445   WBC 7.5 5.0 6.6   RBC 3.63* 3.10* 2.46*   HEMOGLOBIN 11.3* 9.6* 7.7*   HEMATOCRIT 32.7* 28.5* 23.3*   MCV 90.1 91.9 94.7   MCH 31.1 31.0 31.3   MCHC 34.6 33.7 33.0*   RDW 45.1 46.5 48.2   PLATELETCT 146* 114* 164   MPV 9.0 9.9 9.6       Active Hospital Problems    Diagnosis     Displaced fracture of right femoral neck (HCC) [S72.001A]      Priority: High    Subdural hematoma, post-traumatic (HCC) [S06.5X9A]      Priority: High    Acute blood loss anemia [D62]      Priority: Medium    Contraindication to deep vein thrombosis (DVT) prophylaxis [Z53.09]      Priority: Medium    Frequent falls [R29.6]      Priority: Medium    Platelet dysfunction (HCC) [D69.1]      Priority: Medium    Chronic nausea [R11.0]      Priority: Medium    GERD (gastroesophageal reflux disease) [K21.9]      Priority: Medium    Chronic pain syndrome [G89.4]      Priority: Medium    Other hyperlipidemia [E78.49]      Priority: Medium    History of liver transplant (HCC) [Z94.4]      Priority: Medium    Primary pulmonary hypertension (HCC) [I27.0]      Priority:  Medium    Right elbow pain [M25.521]      Priority: Low    Discharge planning issues [Z02.9]      Priority: Low    Trauma [T14.90XA]      Priority: Low    DUC (generalized anxiety disorder) [F41.1]      Priority: Low     IMO load March 2020      Hypothyroidism [E03.9]      Priority: Low    Encephalopathy acute [G93.40]     Seizure disorder (HCC) [G40.909]     S/P bariatric surgery [Z98.84]      Overview:   Added automatically from request for surgery [id number]         Assessment/Plan:  Patient doing well post op  Right hip prevena vac dressing in place without leak  DNVI  POD#1 S/P Right hip hemiarthroplasty   Wt bearing status - WBAT RLE with post hip precautions  Wound care/Drains - Dressings to be changed every other day by nursing  Future Procedures - none planned   Sutures/Staples out- 14 days post operatively  PT/OT-initiated  Antibiotics: Perioperative completed  DVT Prophylaxis- TEDS/SCDs/Foot pumps  Holbrook-none  Case Coordination for Discharge Planning - Disposition rehab

## 2022-08-29 NOTE — ASSESSMENT & PLAN NOTE
She has a previous history of craniotomy 8/21  Dr. Okeefe neurosurgery was consulted and recommends medical management.   Refrain from Lovenox for 7 days after the fall and okay to start if she requires DVT prophylaxis.   Refrain from NSAIDs.

## 2022-08-29 NOTE — PROGRESS NOTES
Pt's  at bedside. Per , pt is baseline confused, even with a recent diagnosis of dementia. Pt had a craniotomy approx 1 year ago per .

## 2022-08-29 NOTE — PROGRESS NOTES
Trauma / Surgical Daily Progress Note    Date of Service  8/29/2022    Chief Complaint  62 y.o. female admitted 8/27/2022 with a right femoral neck fracture and a subdural hematoma status post fall out of bed.    PO day # 1 right hip hemiarthroplasty.    Interval Events    GCS 14.  Following commands.  Adequate pain control.  Anemia.    -Iron replacement per pharmacy kinetics.  -Medicine consult for significant prehospital core morbidities.  -Clinically stable at this time, transfer to [facility].  -Physiatry consult placed.    Review of Systems  Review of Systems   Constitutional:  Negative for chills and fever.   HENT: Negative.     Eyes:         Baseline vision    Respiratory:  Negative for shortness of breath.    Cardiovascular:  Negative for chest pain.   Gastrointestinal:  Negative for abdominal pain, nausea and vomiting.   Musculoskeletal:  Positive for falls, joint pain and myalgias. Negative for back pain and neck pain.   Neurological:  Negative for sensory change and speech change.      Vital Signs  Temp:  [36 °C (96.8 °F)-37.4 °C (99.4 °F)] 37.4 °C (99.4 °F)  Pulse:  [100-120] 120  Resp:  [13-30] 18  BP: ()/(60-73) 127/65  SpO2:  [93 %-100 %] 99 %    Physical Exam  Physical Exam  Vitals and nursing note reviewed.   Constitutional:       General: She is not in acute distress.     Appearance: She is not toxic-appearing.      Comments: Deconditioned.  Appears older than stated age.   HENT:      Head: Normocephalic.      Right Ear: External ear normal.      Left Ear: External ear normal.      Nose: Nose normal.      Mouth/Throat:      Mouth: Mucous membranes are moist.   Eyes:      General: No scleral icterus.     Extraocular Movements: Extraocular movements intact.      Pupils: Pupils are equal, round, and reactive to light.   Cardiovascular:      Rate and Rhythm: Tachycardia present.      Pulses: Normal pulses.      Heart sounds: Normal heart sounds.   Pulmonary:      Effort: Pulmonary effort is normal.  No tachypnea, accessory muscle usage or respiratory distress.      Breath sounds: Examination of the right-lower field reveals decreased breath sounds. Examination of the left-lower field reveals decreased breath sounds. Decreased breath sounds present. No wheezing.   Chest:      Chest wall: No tenderness.   Abdominal:      General: There is no distension.      Palpations: Abdomen is soft.      Tenderness: There is no abdominal tenderness.   Musculoskeletal:         General: No swelling.      Cervical back: Normal range of motion.      Right upper leg: Swelling and tenderness present.   Skin:     General: Skin is warm and dry.      Capillary Refill: Capillary refill takes less than 2 seconds.      Coloration: Skin is not jaundiced.   Neurological:      Mental Status: She is alert.      GCS: GCS eye subscore is 4. GCS verbal subscore is 4. GCS motor subscore is 6.   Psychiatric:      Comments: Unable to assess       Laboratory  Recent Results (from the past 24 hour(s))   POCT glucose device results    Collection Time: 08/28/22  5:37 PM   Result Value Ref Range    POC Glucose, Blood 166 (H) 65 - 99 mg/dL   POCT glucose device results    Collection Time: 08/29/22  1:00 AM   Result Value Ref Range    POC Glucose, Blood 148 (H) 65 - 99 mg/dL   POCT glucose device results    Collection Time: 08/29/22  4:44 AM   Result Value Ref Range    POC Glucose, Blood 142 (H) 65 - 99 mg/dL   CBC with Differential: Tomorrow AM    Collection Time: 08/29/22  4:45 AM   Result Value Ref Range    WBC 6.6 4.8 - 10.8 K/uL    RBC 2.46 (L) 4.20 - 5.40 M/uL    Hemoglobin 7.7 (L) 12.0 - 16.0 g/dL    Hematocrit 23.3 (L) 37.0 - 47.0 %    MCV 94.7 81.4 - 97.8 fL    MCH 31.3 27.0 - 33.0 pg    MCHC 33.0 (L) 33.6 - 35.0 g/dL    RDW 48.2 35.9 - 50.0 fL    Platelet Count 164 164 - 446 K/uL    MPV 9.6 9.0 - 12.9 fL    Neutrophils-Polys 79.90 (H) 44.00 - 72.00 %    Lymphocytes 9.80 (L) 22.00 - 41.00 %    Monocytes 9.00 0.00 - 13.40 %    Eosinophils 0.20  0.00 - 6.90 %    Basophils 0.20 0.00 - 1.80 %    Immature Granulocytes 0.90 0.00 - 0.90 %    Nucleated RBC 0.00 /100 WBC    Neutrophils (Absolute) 5.24 2.00 - 7.15 K/uL    Lymphs (Absolute) 0.64 (L) 1.00 - 4.80 K/uL    Monos (Absolute) 0.59 0.00 - 0.85 K/uL    Eos (Absolute) 0.01 0.00 - 0.51 K/uL    Baso (Absolute) 0.01 0.00 - 0.12 K/uL    Immature Granulocytes (abs) 0.06 0.00 - 0.11 K/uL    NRBC (Absolute) 0.00 K/uL   Comp Metabolic Panel (CMP): Tomorrow AM    Collection Time: 08/29/22  4:45 AM   Result Value Ref Range    Sodium 135 135 - 145 mmol/L    Potassium 4.5 3.6 - 5.5 mmol/L    Chloride 100 96 - 112 mmol/L    Co2 24 20 - 33 mmol/L    Anion Gap 11.0 7.0 - 16.0    Glucose 167 (H) 65 - 99 mg/dL    Bun 17 8 - 22 mg/dL    Creatinine 0.60 0.50 - 1.40 mg/dL    Calcium 7.3 (L) 8.5 - 10.5 mg/dL    AST(SGOT) 28 12 - 45 U/L    ALT(SGPT) 16 2 - 50 U/L    Alkaline Phosphatase 128 (H) 30 - 99 U/L    Total Bilirubin 0.4 0.1 - 1.5 mg/dL    Albumin 2.8 (L) 3.2 - 4.9 g/dL    Total Protein 5.0 (L) 6.0 - 8.2 g/dL    Globulin 2.2 1.9 - 3.5 g/dL    A-G Ratio 1.3 g/dL   Magnesium: Every Monday and Thursday AM    Collection Time: 08/29/22  4:45 AM   Result Value Ref Range    Magnesium 1.6 1.5 - 2.5 mg/dL   Phosphorus: Every Monday and Thursday AM    Collection Time: 08/29/22  4:45 AM   Result Value Ref Range    Phosphorus 2.3 (L) 2.5 - 4.5 mg/dL   IRON/TOTAL IRON BIND    Collection Time: 08/29/22  4:45 AM   Result Value Ref Range    Iron 12 (L) 40 - 170 ug/dL    Total Iron Binding 147 (L) 250 - 450 ug/dL    Unsat Iron Binding 135 110 - 370 ug/dL    % Saturation 8 (L) 15 - 55 %   ESTIMATED GFR    Collection Time: 08/29/22  4:45 AM   Result Value Ref Range    GFR (CKD-EPI) 101 >60 mL/min/1.73 m 2   POCT glucose device results    Collection Time: 08/29/22 10:55 AM   Result Value Ref Range    POC Glucose, Blood 154 (H) 65 - 99 mg/dL       Fluids    Intake/Output Summary (Last 24 hours) at 8/29/2022 1322  Last data filed at 8/29/2022  1200  Gross per 24 hour   Intake 3955.83 ml   Output 250 ml   Net 3705.83 ml       Core Measures & Quality Metrics  Labs reviewed, Radiology images reviewed and Medications reviewed  Holbrook catheter: No Holbrook      DVT Prophylaxis: Contraindicated - High bleeding risk  DVT prophylaxis - mechanical: SCDs  Ulcer prophylaxis: Not indicated    Assessed for rehab: Patient was assess for and/or received rehabilitation services during this hospitalization  RAP Score Total: 11  CAGE Results: negative Blood Alcohol>0.08: not completed     Assessment/Plan  Subdural hematoma, post-traumatic (HCC)- (present on admission)  Assessment & Plan  Right holohemispheric subdural hemorrhage with subacute to chronic components in a parasagittal location overlying the right parietal region measuring up to 8 mm in depth.  Follow up head CT stable.  9/4 Follow up CT.  Non-operative management.    Post traumatic pharmacologic seizure prophylaxis for 1 week.  Speech Language Pathology cognitive evaluation.  Maxwell Okeefe MD. Neurosurgeon. [facility]. (sign off 8/28)     Displaced fracture of right femoral neck (HCC)- (present on admission)  Assessment & Plan  Acute angulated fracture of the right femoral neck.   8/28 Right hip hemiarthroplasty  Weight bearing status - Weightbearing as tolerated RLE.  Anupam Ferrer MD. Orthopedic Surgery.     Acute blood loss anemia- (present on admission)  Assessment & Plan  Transfuse 1 unit PRBC if Hb < 7.0  8/29 Iron studies low - replace per protocol.     Platelet dysfunction (HCC)- (present on admission)  Assessment & Plan  Marked AA pathway platelet inhibition on admission platelet mapping thromoboelastography. One unit of platelets administered.  8/28 Follow up platelet mapping with persistent AA inhibition. One unit of platelets administered.      Frequent falls- (present on admission)  Assessment & Plan  Multiple over the preceding year while in the care of her   PT/OT  ordered to assess mobility needs.    consult placed for safety concerns     Contraindication to deep vein thrombosis (DVT) prophylaxis- (present on admission)  Assessment & Plan  Prophylactic anticoagulation for thrombotic prevention initially contraindicated secondary to elevated bleeding risk.  8/29 Trauma screening bilateral lower extremity venous duplex negative for above knee DVT.     Primary pulmonary hypertension (HCC)- (present on admission)  Assessment & Plan  Chronic condition treated with tadalafil, ambrisentan.  Resumed maintenance medication on admission.   3/2022 ECHO without evidence of pulmonary hypertension, normal right atrial and ventricular pressures.    Chronic nausea- (present on admission)  Assessment & Plan  Chronic condition treated with phenergan, zofran, reglan.  Resumed maintenance medication on admission.     GERD (gastroesophageal reflux disease)- (present on admission)  Assessment & Plan  Chronic condition treated with Omeprazole.  Resumed maintenance medication on admission.      Chronic pain syndrome- (present on admission)  Assessment & Plan  Chronic condition treated with MS Contin and Oxycodone.  Resumed maintenance medication on admission.      Other hyperlipidemia- (present on admission)  Assessment & Plan  Chronic condition treated with pravastatin.  Resumed maintenance medication on admission.      History of liver transplant (HCC)- (present on admission)  Assessment & Plan  Admit ALP mildly elevated, other LFTs normal  Chronic condition treated with tacrolimus, cellcept, furosemide, fludrocortisone, and K-dur  Resumed maintenance medication on admission.    8/29 Medicine consulted.     Discharge planning issues- (present on admission)  Assessment & Plan  Date of admission: 8/27/2022.  8/29Transfer orders from SICU  8/28 Rehab referral.  Cleared for discharge: No.  Discharge delayed: No.  Discharge date: tbd.    Right elbow pain- (present on  admission)  Assessment & Plan  Right elbow pain on tertiary exam.    Diagnostic imaging negative for fracture.     Trauma- (present on admission)  Assessment & Plan  Fell off a bed. Unknown loss of consciousness.  Non Trauma Activation.  Andriy Robertson MD. Trauma Surgery.      DUC (generalized anxiety disorder)- (present on admission)  Assessment & Plan  Chronic condition treated with clonazepam.  Resumed maintenance medication on admission.      Hypothyroidism- (present on admission)  Assessment & Plan  Chronic condition treated with Levothyroxine.  Resumed maintenance medication on admission.          Discussed patient condition with Patient and trauma surgery, Dr. Nirmal Fried.

## 2022-08-29 NOTE — PROGRESS NOTES
Report given to Wayne FRANCIS. Pt ready to transport with all belongings including purse.  aware of new room assignment.

## 2022-08-29 NOTE — ASSESSMENT & PLAN NOTE
Continue outpatient Cellcept and Prograf  Dr. Tello her hepatologist has requested a Prograf level and GGT which have been ordered.

## 2022-08-29 NOTE — ASSESSMENT & PLAN NOTE
Baseline Hb is 14.2 and now Hb is at 6   -- s/p 2  unit of prbc transfusion    -- IV iron  Monitor   Today hemoglobin at 8.1

## 2022-08-29 NOTE — CARE PLAN
The patient is Stable - Low risk of patient condition declining or worsening    Shift Goals  Clinical Goals: Pain control, obtain IV access, mobility  Patient Goals: Pain control, rest  Family Goals: No family present    Progress made toward(s) clinical / shift goals:  Patient's pain controlled at rest and tolerable during mobility with current regimen. US PIV to be placed.    Problem: Pain - Standard  Goal: Alleviation of pain or a reduction in pain to the patient’s comfort goal  Outcome: Progressing     Problem: Knowledge Deficit - Standard  Goal: Patient and family/care givers will demonstrate understanding of plan of care, disease process/condition, diagnostic tests and medications  Outcome: Progressing     Problem: Venous Thromboembolism (VTE) Prevention  Goal: The patient will remain free from venous thromboembolism (VTE)  Outcome: Progressing     Problem: Skin Integrity  Goal: Skin integrity is maintained or improved  Outcome: Progressing     Problem: Fall Risk  Goal: Patient will remain free from falls  Outcome: Progressing

## 2022-08-29 NOTE — CONSULTS
Physical Medicine and Rehabilitation Consultation              Date of initial consultation: 8/29/2022  Requesting provider: JAQUAN Harley   Consulting provider: Lindsay Quezada D.O.  Reason for consultation: assess for acute inpatient rehab appropriateness  LOS: 2 Day(s)    Chief complaint: R femoral neck fracture, fall from bed     HPI: The patient is a 62 y.o. female with a past medical history of COPD, baseline dementia, cardiomegaly, cirrhosis with history of a liver transplant, pulmonary HTN, seizure disorder, CKD II, Dm,  HTN, and prior craniotomy 08/20221 ;  who presented on 8/27/2022  6:04 PM after a GLF. Per documentation, patient may have felt lightheaded before the fall, but does not remember. Apparently she rolled over and fell out of bed. EMS was activated, and upon evaluation in the ED patient was found to have a R femoral neck fracture, acute subdural hematomas, right elbow pain without fracture, and thrombocytopenia.  Patient received one unit plts. Orthopedic surgery was consulted, and patient was taken to the OR on 8/28 for R hip hemiarthroplasty. Post operatively, patient has ABLA with Hgb down to 7.7.  Neurosurgery was also consulted, non operative management for SDH, repeat CT head is stable.     Patient seen and examined at bedside, she is cooperative, but scared. Reports her hip hurts. She is occasionally tearful, saying she is sacred.   I specifically asked the patient if she is scared at home, or ever feels unsafe at home. She stated she just feels sacred.  I asked if she is scared to be the hospital, she stated yes. I asked if she is sacred to go to rehab, she said no. I asked if she feels scared about the idea of going home, she said no. She then became tearful again. Nursing at bedside during discussing of safety.   Besides being scared, patient only complains of hip pain.     Social Hx:  Patient lives with her hsuband, in a 2 story huse with 0 UMESH, but 14 stairs inside.   0 UMESH  At  "prior level of function patient was MOD I with SPC     Tobacco: Denies   Alcohol: Denies   Drugs: Denies     THERAPY:  Restrictions: Fall Risk, WBAT RLE, posterior hip precautions   PT: Functional mobility    Max A bed mobility, refused attempts for sit to stand     OT: ADLs   Max A bed mobility, Max A lower body dressing     SLP:    severe comprehension and memory impairment     IMAGIN/28 CT HEAD   IMPRESSION:     No significant interval change.     CT Head   IMPRESSION:     1.  Right holohemispheric mixed density extra-axial hemorrhage with subacute to chronic components in a parasagittal location and overlying the high right parietal region measuring up to 8 mm in depth. There are also acute components more inferiorly with   a biconvex focal area seen overlying the right posterior parietal region measuring 8 mm in depth. This likely represents subdural hemorrhage however the biconvex focus of hemorrhage could represent an epidural component.     2.  No significant mass effect or midline shift.     3.  Atrophy.     4.  Surgical change involving the left calvarium.      PROCEDURES:   Right hip hemiarthroplasty performed by Dr. Ferrer     PMH:  Past Medical History:   Diagnosis Date    Anemia     Cardiomegaly     Cataract     bilateral IOL    Chronic obstructive pulmonary disease (HCC)     Cirrhosis (HCC) 2011    [place] [facility] [place]    CKD (chronic kidney disease) stage 3, GFR 30-59 ml/min (HCC)     Diabetes (HCC)     reports hx of, resolved w/weight loss. 6/10/22 pt reports recently told she has diabetes    Emphysema of lung (HCC)     Fracture of left foot     GERD (gastroesophageal reflux disease)          H/O Clostridium difficile infection 2017    reports \"16 months ago\". Current stool sample 17 neg    Heart burn     Hemorrhagic disorder (HCC)     \"low platelets\" bruises easily     Hiatus hernia syndrome     High cholesterol     Hypertension     " Hypothyroid     Indigestion     Jaundice 2009    Liver transplanted ([facility])     Low back pain 02/17/2017    and left foot, 7/10    Memory difficulty     short and long term since head injury 8/2021    Mild aortic regurgitation and aortic valve sclerosis      On home oxygen therapy     5 liters, Dr. Gaming    Platelet disorder ([facility])     low platelets    Pneumonia     aspiration,     Psychiatric disorder     Mood disorder    Pulmonary hypertension ([facility])         S/P cholecystectomy     Seizure ([facility]) 05/29/2022    since head injury8/ 2021    Shoulder pain     right    Small bowel obstruction ([facility])     01-    Splenomegaly     VRE (vancomycin-resistant Enterococci)     02-17-17, pt declines knowledge of this       PSH:  Past Surgical History:   Procedure Laterality Date    NV COLONOSCOPY,DIAGNOSTIC N/A 6/21/2022    Procedure: COLONOSCOPY;  Surgeon: Neelam Castellanos D.O.;  Location: [facility];  Service: Gastroenterology    NV UPPER GI ENDOSCOPY,DIAGNOSIS N/A 6/21/2022    Procedure: GASTROSCOPY;  Surgeon: Neelam Castellanos D.O.;  Location: [facility];  Service: Gastroenterology    NV UPPER GI ENDOSCOPY,DIAGNOSIS N/A 02/03/2022    Procedure: GASTROSCOPY;  Surgeon: Aravind Richards M.D.;  [facility];  Service: Gastroenterology    CRANIOTOMY Left 08/04/2021    Procedure: CRANIOTOMY;  Surgeon: Tramaine Arnold III, M.D.;  Location: [facility];  Service: Neurosurgery    VENTRAL HERNIA REPAIR ROBOTIC XI N/A 11/12/2018    Procedure: VENTRAL HERNIA REPAIR ROBOTIC XI- FOR EPIGASTRIC INCISIONAL;  Surgeon: John H Ganser, M.D.;  Location: SURGERY [facility];  Service: General    TURBINATE REDUCTION Bilateral 10/04/2018    Procedure: TURBINATE REDUCTION;  Surgeon: Silviano Erazo M.D.;  Location: [facility];  Service: Ent    NASAL RECONSTRUCTION Bilateral 10/04/2018    Procedure: NASAL RECONSTRUCTION- LATERA IMPLANTS;  Surgeon: Silviano Erazo M.D.;   Location: SURGERY [facility] DAY [facility];  Service: Ent    GASTRIC BYPASS LAPAROSCOPIC  2018    PANNICULECTOMY  12/11/2017    paniculectomy    COLONOSCOPY N/A 03/27/2017    Procedure: COLONOSCOPY;  Surgeon: Osman Matt M.D.;  Location: SURGERY SAME DAY [facility];  Service:     GASTROSCOPY N/A 03/27/2017    Procedure: GASTROSCOPY;  Surgeon: Osman Matt M.D.;  Location: SURGERY SAME DAY [facility];  Service:     BREAST BIOPSY Left 02/21/2017    Procedure: BREAST BIOPSY - WIRE LOCALIZED EXCISONAL ;  Surgeon: Jane Zhao M.D.;  Location: SURGERY SAME DAY [facility];  Service:     LUNG BIOPSY OPEN  11/2016    OTHER ABDOMINAL SURGERY  12/2015    Gastric Sleeve    BONE MARROW ASPIRATION  03/16/2015    Performed by Marlena Hi M.D. at ENDOSCOPY [facility]    BONE MARROW BIOPSY, NDL/TROCAR  03/16/2015    Performed by Marlena Hi M.D. at ENDOSCOPY [facility]    RECOVERY  03/31/2014    Performed by [facility] Surgery at SURGERY [facility]    RECOVERY  03/24/2014    Performed by [facility]overy Surgery at SURGERY SAME DAY [facility]    RECOVERY  01/21/2014    Performed by [facility] at SURGERY SAME DAY [facility]    BRONCHOSCOPY-ENDO  11/15/2013    Performed by Ha Perez M.D. at ENDOSCOPY [facility]    RECOVERY  02/27/2013    Performed by [facility] at SURGERY SAME DAY [facility]    BONE MARROW ASPIRATION  12/31/2012    Performed by Josemanuel Real M.D. at ENDOSCOPY [facility]    BONE MARROW BIOPSY, NDL/TROCAR  12/31/2012    Performed by Josemanuel Real M.D. at ENDOSCOPY [facility]    GASTROSCOPY  09/28/2012    Performed by Aravind Richards M.D. at SURGERY [facility]    SIGMOIDOSCOPY FLEX  09/26/2012    Performed by Kristopher Cardoso M.D. at ENDOSCOPY [facility]    BRONCHOSCOPY-ENDO  06/19/2012    Performed by MARLENA MURILLO at ENDOSCOPY [facility]    BRONCHOSCOPY-ENDO  05/29/2012    Performed by SUYAPA CAMARENA at  ENDOSCOPY [facility]    OTHER ABDOMINAL SURGERY  12/2011    [place] [facility]    GASTROSCOPY-ENDO  03/12/2010    Performed by CAMELIA SAMANO at ENDOSCOPY [facility]    EXAM UNDER ANESTHESIA  11/11/2009    Performed by BIRD ABDI at SURGERY [facility]    HEMORRHOIDECTOMY  11/11/2009    Performed by BIRD ABDI at SURGERY [facility]    KELBY BY LAPAROSCOPY  09/19/2009    Performed by BIRD ABDI at SURGERY [facility]    CARPAL TUNNEL RELEASE           KELBY BY LAPAROSCOPY      HERNIA REPAIR      x3    HYSTERECTOMY, TOTAL ABDOMINAL           MAMMOPLASTY REDUCTION      OTHER SURGICAL PROCEDURE      liver transplant    ID ANESTH,NOSE,SINUS SURGERY      x4    TONSILLECTOMY         FHX:  Family History   Problem Relation Age of Onset    Other Father         Unknown (dead 10 years)    Diabetes Father     Heart Disease Father     Hypertension Father     Hyperlipidemia Father     Stroke Father     Non-contributory Mother     Hyperlipidemia Mother     Alcohol/Drug Mother     Cancer Paternal Aunt     Alcohol/Drug Maternal Grandmother     Alcohol/Drug Maternal Grandfather        Medications:  Current Facility-Administered Medications   Medication Dose    insulin regular (HumuLIN R,NovoLIN R) injection  1-6 Units    And    dextrose 10 % BOLUS 25 g  25 g    sodium phosphate 30 mmol in 1/2  mL ivpb  30 mmol    magnesium sulfate IVPB premix 2 g  2 g    ferric gluconate complex (Ferrlecit) 250 mg in  mL IVPB  250 mg    MD Alert...Total Body Iron Replacement per Pharmacy      Respiratory Therapy Consult      Pharmacy Consult Request ...Pain Management Review 1 Each  1 Each    ondansetron (ZOFRAN) syringe/vial injection 4 mg  4 mg    ondansetron (ZOFRAN ODT) dispertab 4 mg  4 mg    docusate sodium (COLACE) capsule 100 mg  100 mg    senna-docusate (PERICOLACE or SENOKOT S) 8.6-50 MG per tablet 1 Tablet  1 Tablet    senna-docusate (PERICOLACE or SENOKOT S) 8.6-50  MG per tablet 1 Tablet  1 Tablet    polyethylene glycol/lytes (MIRALAX) PACKET 1 Packet  1 Packet    magnesium hydroxide (MILK OF MAGNESIA) suspension 30 mL  30 mL    bisacodyl (DULCOLAX) suppository 10 mg  10 mg    sodium phosphate (Fleet) enema 133 mL  1 Each    NS infusion      oxyCODONE immediate-release (ROXICODONE) tablet 5 mg  5 mg    Or    oxyCODONE immediate release (ROXICODONE) tablet 10 mg  10 mg    Or    HYDROmorphone (Dilaudid) injection 0.5 mg  0.5 mg    ambrisentan (LETAIRIS) TABS 10 mg  10 mg    ascorbic acid (Vitamin C) tablet 500 mg  500 mg    oyster shell calcium/vitamin D 250-125 MG-UNIT tablet 2 Tablet  2 Tablet    clonazePAM (KLONOPIN) tablet 0.5 mg  0.5 mg    fludrocortisone (FLORINEF) tablet 0.1 mg  0.1 mg    furosemide (LASIX) tablet 20 mg  20 mg    lacosamide (VIMPAT) tablet 100 mg  100 mg    levothyroxine (SYNTHROID) tablet 137 mcg  137 mcg    magnesium oxide tablet 400 mg  400 mg    metoclopramide (REGLAN) tablet 10 mg  10 mg    morphine ER (MS CONTIN) tablet 15 mg  15 mg    mycophenolate (CELLCEPT) capsule 250 mg  250 mg    omeprazole (PRILOSEC) capsule 40 mg  40 mg    potassium chloride SA (Kdur) tablet 20 mEq  20 mEq    pravastatin (PRAVACHOL) tablet 20 mg  20 mg    sertraline (Zoloft) tablet 150 mg  150 mg    tacrolimus (PROGRAF) capsule 2 mg  2 mg    tadalafil (CIALIS) TABS 20 mg  20 mg    therapeutic multivitamin-minerals (THERAGRAN-M) tablet 1 Tablet  1 Tablet    promethazine (PHENERGAN) suppository 12.5 mg  12.5 mg    levETIRAcetam (KEPPRA) tablet 750 mg  750 mg       Allergies:  Allergies   Allergen Reactions    Nsaids Unspecified     Can not take due to hx of liver transplant     Rhubarb Anaphylaxis    Organic Nitrates Unspecified     Nitroglycerin products should be avoided with the use of PDE-5 inhibitors as the combination can result in severe hypotension.  RD clarified with pt: Nitrates in food are okay and pt does not want nitrates to be listed as food allergy. Pt only  avoids medications with nitrates.     Other Drug      Any medication that may interact with cyclosporine, tacrolimus, sirolimus, or prograf (due to hx of liver transplant)     Tylenol      Liver transplant    Vancomycin      Red man syndrome       Physical Exam:  Vitals: /67   Pulse (!) 107   Temp 36 °C (96.8 °F) (Temporal)   Resp 19   Wt 53 kg (116 lb 13.5 oz)   SpO2 100%   Gen: NAD, laying comfortably in bed, nursing at side   Head: + forehead ecchymosis  Eyes/ Nose/ Mouth: PERRLA, moist mucous membranes  Cardio: RRR, good distal perfusion, warm extremities  Pulm: normal respiratory effort, no cyanosis   Abd: Soft NTND,   Ext: No peripheral edema. No calf tenderness. No clubbing. + R knee effusion   Mood: tearful and scared     Mental status:  A&Ox4 (person, place, date, situation) answers questions appropriately follows commands  Speech: fluent, no aphasia or dysarthria    CRANIAL NERVES:  2,3: visual acuity grossly intact, PERRL  3,4,6: EOMI bilaterally, no nystagmus or diplopia  5: sensation intact to light touch bilaterally and symmetric  7: no facial asymmetry  8: hearing grossly intact      Motor:      Upper Extremity  Myotome R L   Shoulder flexion C5 5/5 5/5   Elbow flexion C5 5/5 5/5   Wrist extension C6 5/5 5/5   Elbow extension C7 5/5 5/5   Finger flexion C8 5/5 5/5   Finger abduction T1 5/5 5/5     Lower Extremity Myotome R L   Hip flexion L2 1/5 3/5   Knee extension L3 2/5 5/5   Ankle dorsiflexion L4 5/5 5/5   Toe extension L5 5/5 5/5   Ankle plantarflexion S1 5/5 5/5       Negative Pronator drift bilaterally    Sensory:   intact to light touch through out b/l upper and lower extremities     DTRs: 2+ in bilateral  biceps,  No clonus at bilateral ankles  Negative babinski b/l  Tone: no spasticity noted, no cogwheeling noted    Coordination:   intact finger to nose bilaterally  intact fine motor with fingers bilaterally      Labs: Reviewed and significant for   Recent Labs     08/27/22  3415  08/28/22  0420 08/29/22  0445   RBC 3.63* 3.10* 2.46*   HEMOGLOBIN 11.3* 9.6* 7.7*   HEMATOCRIT 32.7* 28.5* 23.3*   PLATELETCT 146* 114* 164   PROTHROMBTM 13.6  --   --    APTT 28.7  --   --    INR 1.05  --   --    IRON  --   --  12*   TOTIRONBC  --   --  147*     Recent Labs     08/27/22  1827 08/28/22  0420 08/29/22 0445   SODIUM 139 139 135   POTASSIUM 4.3 3.7 4.5   CHLORIDE 100 104 100   CO2 28 27 24   GLUCOSE 205* 142* 167*   BUN 20 21 17   CREATININE 0.75 0.66 0.60   CALCIUM 8.7 8.0* 7.3*     Recent Results (from the past 24 hour(s))   POCT glucose device results    Collection Time: 08/28/22  5:37 PM   Result Value Ref Range    POC Glucose, Blood 166 (H) 65 - 99 mg/dL   POCT glucose device results    Collection Time: 08/29/22  1:00 AM   Result Value Ref Range    POC Glucose, Blood 148 (H) 65 - 99 mg/dL   POCT glucose device results    Collection Time: 08/29/22  4:44 AM   Result Value Ref Range    POC Glucose, Blood 142 (H) 65 - 99 mg/dL   CBC with Differential: Tomorrow AM    Collection Time: 08/29/22  4:45 AM   Result Value Ref Range    WBC 6.6 4.8 - 10.8 K/uL    RBC 2.46 (L) 4.20 - 5.40 M/uL    Hemoglobin 7.7 (L) 12.0 - 16.0 g/dL    Hematocrit 23.3 (L) 37.0 - 47.0 %    MCV 94.7 81.4 - 97.8 fL    MCH 31.3 27.0 - 33.0 pg    MCHC 33.0 (L) 33.6 - 35.0 g/dL    RDW 48.2 35.9 - 50.0 fL    Platelet Count 164 164 - 446 K/uL    MPV 9.6 9.0 - 12.9 fL    Neutrophils-Polys 79.90 (H) 44.00 - 72.00 %    Lymphocytes 9.80 (L) 22.00 - 41.00 %    Monocytes 9.00 0.00 - 13.40 %    Eosinophils 0.20 0.00 - 6.90 %    Basophils 0.20 0.00 - 1.80 %    Immature Granulocytes 0.90 0.00 - 0.90 %    Nucleated RBC 0.00 /100 WBC    Neutrophils (Absolute) 5.24 2.00 - 7.15 K/uL    Lymphs (Absolute) 0.64 (L) 1.00 - 4.80 K/uL    Monos (Absolute) 0.59 0.00 - 0.85 K/uL    Eos (Absolute) 0.01 0.00 - 0.51 K/uL    Baso (Absolute) 0.01 0.00 - 0.12 K/uL    Immature Granulocytes (abs) 0.06 0.00 - 0.11 K/uL    NRBC (Absolute) 0.00 K/uL   Comp Metabolic  Panel (CMP): Tomorrow AM    Collection Time: 08/29/22  4:45 AM   Result Value Ref Range    Sodium 135 135 - 145 mmol/L    Potassium 4.5 3.6 - 5.5 mmol/L    Chloride 100 96 - 112 mmol/L    Co2 24 20 - 33 mmol/L    Anion Gap 11.0 7.0 - 16.0    Glucose 167 (H) 65 - 99 mg/dL    Bun 17 8 - 22 mg/dL    Creatinine 0.60 0.50 - 1.40 mg/dL    Calcium 7.3 (L) 8.5 - 10.5 mg/dL    AST(SGOT) 28 12 - 45 U/L    ALT(SGPT) 16 2 - 50 U/L    Alkaline Phosphatase 128 (H) 30 - 99 U/L    Total Bilirubin 0.4 0.1 - 1.5 mg/dL    Albumin 2.8 (L) 3.2 - 4.9 g/dL    Total Protein 5.0 (L) 6.0 - 8.2 g/dL    Globulin 2.2 1.9 - 3.5 g/dL    A-G Ratio 1.3 g/dL   Magnesium: Every Monday and Thursday AM    Collection Time: 08/29/22  4:45 AM   Result Value Ref Range    Magnesium 1.6 1.5 - 2.5 mg/dL   Phosphorus: Every Monday and Thursday AM    Collection Time: 08/29/22  4:45 AM   Result Value Ref Range    Phosphorus 2.3 (L) 2.5 - 4.5 mg/dL   IRON/TOTAL IRON BIND    Collection Time: 08/29/22  4:45 AM   Result Value Ref Range    Iron 12 (L) 40 - 170 ug/dL    Total Iron Binding 147 (L) 250 - 450 ug/dL    Unsat Iron Binding 135 110 - 370 ug/dL    % Saturation 8 (L) 15 - 55 %   ESTIMATED GFR    Collection Time: 08/29/22  4:45 AM   Result Value Ref Range    GFR (CKD-EPI) 101 >60 mL/min/1.73 m 2   POCT glucose device results    Collection Time: 08/29/22 10:55 AM   Result Value Ref Range    POC Glucose, Blood 154 (H) 65 - 99 mg/dL         ASSESSMENT:  Patient is a 62 y.o. female admitted with R hip fx and SDH     [facility] Code / Diagnosis to Support: 0014.2 - Major Multiple Trauma: Brain + Multiple Fracture/Amputation    Rehabilitation: Impaired ADLs and mobility  Patient is a poor  candidate for inpatient rehab based on needs poor endurance and baseline dementia.     Barriers to transfer include: Insurance authorization, TCCs to verify disposition, medical clearance and bed availability     Additional Recommendations:  TBI  Right SDH   - sustained during ground  level fall  - CT head with evidence of right holohemispheric subdural hemorrhage with subacute to chronic component   - repeat CT head stable   - neurosurgery consulted, non operative management   - patient is on chronic anti epileptics meds   - PT/OT/SLP eval pending     R femoral neck fracture   -sustained during fall  - s/p hemiarthroplasty performed by Dr. Ferrer on 8/29     Dispo:  - patient is currently functioning below their level of baseline, recommend post acute rehab  - recommend SNF level therapy for prolonged rehab program due baseline dementia and poor endurance   - PM&R to sign off at this time     Medical Complexity:  R femoral neck fracture  TBI SDH  History of prior craniotomy in 2021   History of liver transplant   History of siezure disorder     DVT PPX: SCDs       Thank you for allowing us to participate in the care of this patient.     Patient was seen for 86 minutes on unit/floor of which > 50% of time was spent on counseling and coordination of care regarding the above, including prognosis, risk reduction, benefits of treatment, and options for next stage of care.    Lindsay Quezada D.O.   Physical Medicine and Rehabilitation     Please note that this dictation was created using voice recognition software. I have made every reasonable attempt to correct obvious errors, but there may be errors of grammar and possibly content that I did not discover before finalizing the note.

## 2022-08-29 NOTE — ASSESSMENT & PLAN NOTE
She is on outpatient MS Contin 15 mg BID and oxycodone 10 mg BID. These have been continued.   Along with prn anti-htn meds

## 2022-08-29 NOTE — CONSULTS
Hospital Medicine Consultation    Date of Service  8/29/2022    Referring Physician  Nirmal Fried M.D.    Consulting Physician  Josemanuel Real M.D.    Reason for Consultation  Seizure disorder.    History of Presenting Illness  62 y.o. female who presented 8/27/2022 with fall out of bed.  Mrs. Cuba has a complex medical history including liver transplant on chronic immunosuppressive therapy, hepatopulmonary pulmonary hypertension, seizure disorder, chronic pain syndrome that had the misfortune of falling out of bed on her right hip.  She was brought to the emergency room where a CT of the head reveals a right holohemispheric acute to chronic subdural hematoma as well as an acute angulated fracture of the right femoral neck.  She was evaluated by Dr. Okeefe neurosurgery who recommended nonsurgical management.  She was admitted to the trauma service to the ICU.  On 8/28/2022 she underwent right hip hemiarthroplasty by Dr. Ferrer orthopedic surgery.    8/29/2022: Mrs. Cuba was seen and evaluated in the ICU.  I spoke with Dr. Fried, trauma surgery and he has requested consultation.  She is oriented only to self. I met with her , Otis.  He states she was on Klonopin as needed not scheduled.  He also showed me paperwork from Dr. Tello her hepatologist who had requested a GGT and Prograf level both of which I have ordered.  Physiatry has been consulted. I discussed with Dr. Okeefe who recommends refraining from Lovenox until at least 7 days post-fall then okay if she needs it (if she is not ambulatory by then).    Review of Systems  Review of Systems   Unable to perform ROS: Mental acuity     Past Medical History   has a past medical history of Anemia, Cardiomegaly, Cataract, Chronic obstructive pulmonary disease (HCC), Cirrhosis (HCC) (12/2011), CKD (chronic kidney disease) stage 3, GFR 30-59 ml/min (HCC), Diabetes (HCC), Emphysema of lung (HCC), Fracture of left foot, GERD (gastroesophageal reflux  disease), H/O Clostridium difficile infection (02/17/2017), Heart burn, Hemorrhagic disorder (HCC), Hiatus hernia syndrome, High cholesterol, Hypertension, Hypothyroid, Indigestion, Jaundice (2009), Liver transplanted (HCC), Low back pain (02/17/2017), Memory difficulty, Mild aortic regurgitation and aortic valve sclerosis ( ), On home oxygen therapy, Platelet disorder (HCC), Pneumonia, Psychiatric disorder, Pulmonary hypertension (HCC), S/P cholecystectomy, Seizure (HCC) (05/29/2022), Shoulder pain, Small bowel obstruction (HCC), Splenomegaly, and VRE (vancomycin-resistant Enterococci).    Surgical History   has a past surgical history that includes pr anesth,nose,sinus surgery; makayla by laparoscopy (09/19/2009); exam under anesthesia (11/11/2009); hysterectomy, total abdominal; gastroscopy-endo (03/12/2010); bronchoscopy-endo (05/29/2012); bronchoscopy-endo (06/19/2012); sigmoidoscopy flex (09/26/2012); recovery (02/27/2013); bronchoscopy-endo (11/15/2013); recovery (01/21/2014); recovery (03/24/2014); hemorrhoidectomy (11/11/2009); gastroscopy (09/28/2012); carpal tunnel release; bone marrow aspiration (12/31/2012); bone marrow biopsy, ndl/trocar (12/31/2012); recovery (03/31/2014); other abdominal surgery (12/2011); bone marrow aspiration (03/16/2015); bone marrow biopsy, ndl/trocar (03/16/2015); lung biopsy open (11/2016); tonsillectomy; other abdominal surgery (12/2015); breast biopsy (Left, 02/21/2017); colonoscopy (N/A, 03/27/2017); gastroscopy (N/A, 03/27/2017); gastric bypass laparoscopic (2018); hernia repair; makayla by laparoscopy; mammoplasty reduction; panniculectomy (12/11/2017); other surgical procedure; turbinate reduction (Bilateral, 10/04/2018); nasal reconstruction (Bilateral, 10/04/2018); ventral hernia repair robotic xi (N/A, 11/12/2018); craniotomy (Left, 08/04/2021); pr upper gi endoscopy,diagnosis (N/A, 02/03/2022); pr colonoscopy,diagnostic (N/A, 6/21/2022); pr upper gi endoscopy,diagnosis  (N/A, 6/21/2022); and pr partial hip replacement (Right, 8/28/2022).    Family History  family history includes Alcohol/Drug in her maternal grandfather, maternal grandmother, and mother; Cancer in her paternal aunt; Diabetes in her father; Heart Disease in her father; Hyperlipidemia in her father and mother; Hypertension in her father; Non-contributory in her mother; Other in her father; Stroke in her father.    Social History   reports that she has never smoked. She has never used smokeless tobacco. She reports that she does not currently use alcohol. She reports that she does not use drugs.    Medications  Prior to Admission Medications   Prescriptions Last Dose Informant Patient Reported? Taking?   Ascorbic Acid (VITAMIN C) 500 MG Chew Tab 8/27/2022 at 0800 Family Member Yes No   Sig: Chew 500 mg every day. Indications: Inadequate Vitamin C   CALCIUM-VITAMIN D PO 8/27/2022 at 1200 Family Member Yes No   Sig: Take 1 Tablet by mouth every evening.   albuterol (PROAIR HFA) 108 (90 Base) MCG/ACT Aero Soln inhalation aerosol > 1 week at unknown Family Member No No   Sig: Inhale 2 Puffs every four hours as needed for Shortness of Breath (wheezing).   ambrisentan (LETAIRIS) 10 MG tablet 8/27/2022 at 0800 Family Member No No   Sig: TAKE 1 TABLET BY MOUTH EVERY DAY   clonazePAM (KLONOPIN) 0.5 MG Tab 8/27/2022 at 1200 Family Member Yes Yes   Sig: Take 0.5 mg by mouth 3 times a day.   fludrocortisone (FLORINEF) 0.1 MG Tab 8/27/2022 at 0800 Family Member Yes Yes   Sig: Take 0.1 mg by mouth every day.   furosemide (LASIX) 20 MG Tab 8/27/2022 at 0800 Family Member No No   Sig: Take 1 Tablet by mouth every day. Indications: Edema   lacosamide (VIMPAT) 100 MG Tab tablet 8/27/2022 at 0800 Family Member No No   Sig: Take 1 Tablet by mouth 2 times a day for 180 days.   levetiracetam (KEPPRA) 750 MG tablet 8/27/2022 at 0800 Family Member No No   Sig: TAKE 2 TABLETS BY ENTERAL TUBE ROUTE 2 TIMES A DAY FOR 30 DAYS.   Patient taking  differently: Take 750 mg by mouth 2 times a day.   levothyroxine (SYNTHROID) 137 MCG Tab 8/27/2022 at 0700 Family Member No No   Sig: Take 1 Tablet by mouth every morning on an empty stomach. Indications: Underactive Thyroid   magnesium oxide (MAG-OX) 400 MG Tab tablet 8/27/2022 at 1200 Family Member Yes No   Sig: Take 400 mg by mouth every day.   metoclopramide (REGLAN) 10 MG Tab 8/27/2022 at 0800 Family Member Yes No   Sig: Take 10 mg by mouth 2 times a day.   morphine ER (MS CONTIN) 15 MG Tab CR tablet 8/27/2022 at 0800 Family Member No No   Sig: Take 1 Tablet by mouth every 12 hours for 30 days.   mycophenolate (CELLCEPT) 250 MG Cap 8/27/2022 at 0800 Family Member Yes No   Sig: Take 250 mg by mouth 2 times a day. Indications: Liver Transplant Recipient   omeprazole (PRILOSEC) 40 MG delayed-release capsule 8/27/2022 at 0800 Family Member No No   Sig: Take 1 Capsule by mouth every day.   ondansetron (ZOFRAN ODT) 4 MG TABLET DISPERSIBLE 8/26/2022 at 2030 Family Member No No   Sig: DISSOLVE 1 TAB ON TONGUE EVERY 8 HOURS AS NEEDED FOR NAUSEA   oxyCODONE immediate release (ROXICODONE) 10 MG immediate release tablet 8/27/2022 at 0800 Family Member No No   Sig: Take 1 Tablet by mouth 2 (two) times a day for 30 days.   potassium chloride SA (KDUR) 20 MEQ Tab CR 8/27/2022 at 0800 Family Member No No   Sig: Take 1 Tablet by mouth every day.   pravastatin (PRAVACHOL) 20 MG Tab 8/27/2022 at 0800 Family Member No No   Sig: Take 1 Tablet by mouth every day.   promethazine (PHENERGAN) 25 MG Suppos > 1 week at unknown Family Member No No   Sig: Insert 1 Suppository into the rectum every 6 hours as needed for Nausea/Vomiting.   sertraline (ZOLOFT) 100 MG Tab 8/26/2022 at 2000 Family Member No No   Sig: Take 1.5 Tablets by mouth every day.   Patient taking differently: Take 150 mg by mouth every evening.   tacrolimus (PROGRAF) 1 MG Cap 8/27/2022 at 0800 Family Member No No   Sig: TAKE 4 CAPSULES BY MOUTH EVERY DAY FOR 30 DAYS    Patient taking differently: Take 2 mg by mouth 2 times a day.   tadalafil (CIALIS) 20 MG tablet 8/27/2022 at 0800 Family Member No No   Sig: Take 1 Tablet by mouth every day.   therapeutic multivitamin-minerals (THERAGRAN-M) Tab 8/27/2022 at 0800 Family Member Yes Yes   Sig: Take 1 Tablet by mouth every day.      Facility-Administered Medications: None       Allergies  Allergies   Allergen Reactions    Nsaids Unspecified     Can not take due to hx of liver transplant     Rhubarb Anaphylaxis    Organic Nitrates Unspecified     Nitroglycerin products should be avoided with the use of PDE-5 inhibitors as the combination can result in severe hypotension.  RD clarified with pt: Nitrates in food are okay and pt does not want nitrates to be listed as food allergy. Pt only avoids medications with nitrates.     Other Drug      Any medication that may interact with cyclosporine, tacrolimus, sirolimus, or prograf (due to hx of liver transplant)     Tylenol      Liver transplant    Vancomycin      Red man syndrome       Physical Exam  Temp:  [36 °C (96.8 °F)-37.4 °C (99.4 °F)] 37.4 °C (99.4 °F)  Pulse:  [100-120] 120  Resp:  [13-30] 18  BP: ()/(60-73) 127/65  SpO2:  [93 %-100 %] 99 %    Physical Exam  Vitals and nursing note reviewed.   Constitutional:       Comments: thin   HENT:      Head:      Comments: Right forehead abrasion and bruise     Mouth/Throat:      Mouth: Mucous membranes are dry.      Pharynx: Oropharynx is clear.   Eyes:      General: No scleral icterus.     Conjunctiva/sclera: Conjunctivae normal.   Neck:      Comments: Right EJ line  Cardiovascular:      Rate and Rhythm: Normal rate and regular rhythm.      Heart sounds: No murmur heard.  Pulmonary:      Effort: Pulmonary effort is normal.      Breath sounds: Normal breath sounds.   Abdominal:      General: There is no distension.      Tenderness: There is no abdominal tenderness.   Musculoskeletal:      Cervical back: Normal range of motion and neck  supple.      Comments: Right hip wound VAC  No calf tenderness   Skin:     Comments: Scab and bruise right knee   Neurological:      Mental Status: She is alert.      Comments: She generally moves her extremities equally though limited right leg due to pain  She is oriented only to self   Psychiatric:      Comments: Anxious and confused.       Fluids  Date 08/29/22 0700 - 08/30/22 0659   Shift 1772-6170 6401-7066 7032-9625 24 Hour Total   INTAKE   P.O. 200   200   I.V. 475.8   475.8   IV Piggyback 0   0   Shift Total 675.8   675.8   OUTPUT   Shift Total       Weight (kg) 53 53 53 53       Laboratory  Recent Labs     08/27/22 1827 08/28/22  0420 08/29/22  0445   WBC 7.5 5.0 6.6   RBC 3.63* 3.10* 2.46*   HEMOGLOBIN 11.3* 9.6* 7.7*   HEMATOCRIT 32.7* 28.5* 23.3*   MCV 90.1 91.9 94.7   MCH 31.1 31.0 31.3   MCHC 34.6 33.7 33.0*   RDW 45.1 46.5 48.2   PLATELETCT 146* 114* 164   MPV 9.0 9.9 9.6     Recent Labs     08/27/22 1827 08/28/22 0420 08/29/22  0445   SODIUM 139 139 135   POTASSIUM 4.3 3.7 4.5   CHLORIDE 100 104 100   CO2 28 27 24   GLUCOSE 205* 142* 167*   BUN 20 21 17   CREATININE 0.75 0.66 0.60   CALCIUM 8.7 8.0* 7.3*     Recent Labs     08/27/22 1827   APTT 28.7   INR 1.05                 Imaging  IR-US GUIDED PIV   Final Result    Ultrasound-guided PERIPHERAL IV INSERTION performed by    qualified nursing staff as above.      US-TRAUMA VEIN SCREEN LOWER BILAT EXTREMITY   Final Result      DX-PELVIS-1 OR 2 VIEWS   Final Result         1. Status post right hip arthroplasty without evidence of early hardware complication.      DX-ELBOW-LIMITED 2- RIGHT   Final Result      1.  No acute fracture seen on these 2 views of the right elbow.   2.  There is evidence of an old healed fracture of the proximal ulna..      CT-HEAD W/O   Final Result      No significant interval change.         CT-HEAD W/O   Final Result      1.  Right holohemispheric mixed density extra-axial hemorrhage with subacute to chronic components  in a parasagittal location and overlying the high right parietal region measuring up to 8 mm in depth. There are also acute components more inferiorly with    a biconvex focal area seen overlying the right posterior parietal region measuring 8 mm in depth. This likely represents subdural hemorrhage however the biconvex focus of hemorrhage could represent an epidural component.      2.  No significant mass effect or midline shift.      3.  Atrophy.      4.  Surgical change involving the left calvarium.      5.  This was discussed with ANH CONLEY at 7:06 PM on 8/27/2022.         DX-KNEE 2- RIGHT   Final Result         1. No acute osseous abnormality but evaluation is limited due to osseous demineralization.      DX-HIP-UNILATERAL-WITH PELVIS-1 VIEW RIGHT   Final Result         Acute angulated fracture of the right femoral neck      DX-CHEST-PORTABLE (1 VIEW)   Final Result      No evidence of acute cardiopulmonary process.          Assessment/Plan  * Subdural hematoma, post-traumatic (HCC)- (present on admission)  Assessment & Plan  She has a previous history of craniotomy 8/21  Dr. Okeefe neurosurgery was consulted and recommends medical management.   Refrain from Lovenox for 7 days after the fall and okay to start if she requires DVT prophylaxis.   Refrain from NSAIDs.     Encephalopathy acute- (present on admission)  Assessment & Plan  This may be a combination of subdural hematoma, ICU delirium, and post-anesthesia  Frequent reorienting, refrain from escalation of her home dose of Klonipin 0.5 mg TID change it to as needed  Ammonia level has been ordered.    Trauma- (present on admission)  Assessment & Plan  She was admitted to the trauma service  She may be an excellent candidate for inpatient rehab prior to discharge home as she had PT/OT needs and is very medically complex with active, acute problems (liver transplant, subdural hematoma, acute encephalopathy, pain control).    Displaced fracture of right  femoral neck (HCC)- (present on admission)  Assessment & Plan  Status post surgery by Dr. Ferrer on 8/28  Weight-bearing as tolerated  Pain control  SCDs for DVT prophylaxis until 7 days after admission at which time she may have Lovenox 40 mg daily if she is not ambulatory (per Dr. Okeefe).    History of liver transplant (HCC)- (present on admission)  Assessment & Plan  Continue outpatient Cellcept and Prograf  Dr. Tello her hepatologist has requested a Prograf level and GGT which have been ordered.    Seizure disorder (HCC)- (present on admission)  Assessment & Plan  She is on Keppra 750 mg BID and Vimpat 100 mg BID outpatient both of which have been continued.     Acute blood loss anemia- (present on admission)  Assessment & Plan  Baseline Hb is 14.2 and now Hb 7.7  She does not have any further bleeding nor surgeries therefore refrain from daily phlebotomy.  Also iron deficiency with serum iron of 12 and % sat of 8 therefore pharmacy consult for IV iron infusion.    Primary pulmonary hypertension (HCC)- (present on admission)  Assessment & Plan  Followed by Dr. Phan cardiology  Continue Ambrisentan and Cialis    S/P bariatric surgery- (present on admission)  Assessment & Plan  Hx of    Chronic pain syndrome- (present on admission)  Assessment & Plan  She is on outpatient MS Contin 15 mg BID and oxycodone 10 mg BID. These have been continued.     DUC (generalized anxiety disorder)- (present on admission)  Assessment & Plan  Continue Zoloft 150 mg daily  She was on Klonopin 0.5 mg TID at home per her home med rec. This will be reviewed with her , Otis.    Hypothyroidism- (present on admission)  Assessment & Plan  Continue synthroid 137 mcg daily

## 2022-08-29 NOTE — THERAPY
Occupational Therapy   Initial Evaluation     Patient Name: Roxana Cuba  Age:  62 y.o., Sex:  female  Medical Record #: [id number]  Today's Date: 8/29/2022     Precautions: Fall Risk, Weight Bearing As Tolerated Right Lower Extremity, Posterior Hip Precautions  Comments: L jojo hip sx, SDH    Assessment  Patient is 62 y.o. female admitted after falling out of bed found to have R femoral neck fx and SDH now s/p R hip arthroplasty. Pt limited by poor pain control, appeared confusion with poor insight, poor balance, and limited knowledge of post op precautions. She was very tearful throughout and required max cues for encouragement with mobility. Will need to verify home set up, however pt reports she lives with her  in a 2 story home. Will follow for skilled OT.    Plan    Recommend Occupational Therapy 3 times per week until therapy goals are met for the following treatments:  Adaptive Equipment, Cognitive Skill Development, Neuro Re-Education / Balance, Self Care/Activities of Daily Living, Sensory Integration Techniques, Therapeutic Activities, and Therapeutic Exercises.    DC Equipment Recommendations: Unable to determine at this time  Discharge Recommendations: Recommend post-acute placement for additional occupational therapy services prior to discharge home      08/29/22 1043   Prior Living Situation   Prior Services Home-Independent   Housing / Facility 2 Story House   Equipment Owned 4-Wheel Walker;Single Point Cane   Lives with - Patient's Self Care Capacity Spouse   Comments Pt emotionally labile throughout, will need to clarify home set up   Prior Level of ADL Function   Self Feeding Independent   Grooming / Hygiene Independent   Bathing Independent   Dressing Independent   Toileting Independent   Prior Level of IADL Function   Medication Management Independent   Laundry Independent   Kitchen Mobility Independent   Finances Independent   History of Falls   History of Falls Yes   Date of Last Fall    (reason for admit)   Precautions   Precautions Fall Risk;Weight Bearing As Tolerated Right Lower Extremity;Posterior Hip Precautions   Comments L jojo hip sx, SDH   Non Verbal Descriptors   Non Verbal Scale  Crying   Cognition    Cognition / Consciousness X   Level of Consciousness Alert   Safety Awareness Impaired   New Learning Impaired   Attention Impaired   Comments Pt emotionally labile throughout. Appeared intermittenly confused, poor insight   Active ROM Upper Body   Active ROM Upper Body  WDL   Strength Upper Body   Upper Body Strength  X   Gross Strength Generalized Weakness, Equal Bilaterally.    Balance Assessment   Sitting Balance (Static) Fair -   Sitting Balance (Dynamic) Poor +   Weight Shift Sitting Poor   Bed Mobility    Supine to Sit Maximal Assist   Sit to Supine Maximal Assist   Scooting Maximal Assist   Rolling Maximal Assist to Rt.;Maximum Assist to Lt.   ADL Assessment   Grooming Moderate Assist;Seated   Lower Body Dressing Maximal Assist   How much help from another person does the patient currently need...   6 Clicks Daily Activity Score 15   Functional Mobility   Sit to Stand Refused   Bed, Chair, Wheelchair Transfer Refused   Toilet Transfers Refused   Patient / Family Goals   Patient / Family Goal #1 to lay back down   Short Term Goals   Short Term Goal # 1 Pt will demo functional ADL txfs CGA   Short Term Goal # 2 Pt will verbalize 3/3 posterior hip precautions when cued   Short Term Goal # 3 Pt will complete LB dressing using AD SPV   Education Group   Role of Occupational Therapist Patient Response Patient;Acceptance;Explanation   Problem List   Problem List Decreased Active Daily Living Skills;Decreased Homemaking Skills;Decreased Functional Mobility;Decreased Activity Tolerance;Decreased Upper Extremity Strength Right;Decreased Upper Extremity Strength Left;Impaired Cognitive Function;Impaired Postural Control / Balance;Limited Knowledge of Post Op Precautions   Anticipated  Discharge Equipment and Recommendations   DC Equipment Recommendations Unable to determine at this time   Discharge Recommendations Recommend post-acute placement for additional occupational therapy services prior to discharge home

## 2022-08-29 NOTE — ASSESSMENT & PLAN NOTE
She is on Keppra 750 mg BID and Vimpat 100 mg BID outpatient both of which have been continued.   Seizure precaution

## 2022-08-29 NOTE — DISCHARGE PLANNING
[facility] Transitional Care Coordination    Referral from: JAQUAN Buschbar    Insurance Provider on Facesheet: [facility]  Potential Rehab Diagnosis: TBI    Chart review indicates patient may need on going medical management and may have therapy needs to possibly meet inpatient rehab facility criteria with the goal of returning to community.    D/C support: Spouse - will need to verify.     Physiatry consultation forwarded per protocol.     TBI and hip fx s/p fall from bed - physiatry to consult, awaiting therapy evals when appropriate. TCC will follow.     Thank you for the referral.

## 2022-08-29 NOTE — ASSESSMENT & PLAN NOTE
This may be a combination of subdural hematoma, ICU delirium, and post-anesthesia   Resolved on 8/30   -- Able to answer alert and oriented questions

## 2022-08-29 NOTE — PROGRESS NOTES
Trauma / Surgical Daily Progress Note    Date of Service  8/29/2022    Chief Complaint  62 y.o. female admitted 8/27/2022 with fall out of bed, right femur fracture    Interval Events    ORIF of femur yesterday  Hb low - follow  Iron studies low - replace with ferric gluconate  Medicine consult  Stable for transfer to floor    Review of Systems  Review of Systems   Musculoskeletal:  Positive for arthralgias, back pain and myalgias.      Vital Signs for last 24 hours  Temp:  [36 °C (96.8 °F)-37.2 °C (98.9 °F)] 36.7 °C (98 °F)  Pulse:  [100-116] 107  Resp:  [13-30] 19  BP: ()/(60-73) 129/67  SpO2:  [93 %-100 %] 100 %    Hemodynamic parameters for last 24 hours       Respiratory Data     Respiration: 19, Pulse Oximetry: 100 %        RUL Breath Sounds: Clear, RML Breath Sounds: Diminished, RLL Breath Sounds: Diminished, MANJINDER Breath Sounds: Clear, LLL Breath Sounds: Diminished    Physical Exam  Physical Exam  Vitals and nursing note reviewed.   Constitutional:       General: She is not in acute distress.     Appearance: She is not toxic-appearing.   HENT:      Head: Normocephalic.      Right Ear: External ear normal.      Left Ear: External ear normal.      Nose: Nose normal.      Mouth/Throat:      Mouth: Mucous membranes are moist.   Eyes:      General: No scleral icterus.     Extraocular Movements: Extraocular movements intact.      Pupils: Pupils are equal, round, and reactive to light.   Cardiovascular:      Rate and Rhythm: Normal rate and regular rhythm.      Pulses: Normal pulses.      Heart sounds: Normal heart sounds.   Pulmonary:      Effort: Pulmonary effort is normal. No respiratory distress.      Breath sounds: Normal breath sounds. No wheezing.   Abdominal:      General: There is no distension.      Palpations: Abdomen is soft.      Tenderness: There is no abdominal tenderness.   Musculoskeletal:         General: No swelling.      Cervical back: Normal range of motion.   Skin:     General: Skin is  warm and dry.      Capillary Refill: Capillary refill takes less than 2 seconds.      Coloration: Skin is not jaundiced.   Neurological:      General: No focal deficit present.      Mental Status: She is alert.      Cranial Nerves: No cranial nerve deficit.      Sensory: No sensory deficit.       Laboratory  Recent Results (from the past 24 hour(s))   POCT glucose device results    Collection Time: 08/28/22  5:37 PM   Result Value Ref Range    POC Glucose, Blood 166 (H) 65 - 99 mg/dL   POCT glucose device results    Collection Time: 08/29/22  1:00 AM   Result Value Ref Range    POC Glucose, Blood 148 (H) 65 - 99 mg/dL   POCT glucose device results    Collection Time: 08/29/22  4:44 AM   Result Value Ref Range    POC Glucose, Blood 142 (H) 65 - 99 mg/dL   CBC with Differential: Tomorrow AM    Collection Time: 08/29/22  4:45 AM   Result Value Ref Range    WBC 6.6 4.8 - 10.8 K/uL    RBC 2.46 (L) 4.20 - 5.40 M/uL    Hemoglobin 7.7 (L) 12.0 - 16.0 g/dL    Hematocrit 23.3 (L) 37.0 - 47.0 %    MCV 94.7 81.4 - 97.8 fL    MCH 31.3 27.0 - 33.0 pg    MCHC 33.0 (L) 33.6 - 35.0 g/dL    RDW 48.2 35.9 - 50.0 fL    Platelet Count 164 164 - 446 K/uL    MPV 9.6 9.0 - 12.9 fL    Neutrophils-Polys 79.90 (H) 44.00 - 72.00 %    Lymphocytes 9.80 (L) 22.00 - 41.00 %    Monocytes 9.00 0.00 - 13.40 %    Eosinophils 0.20 0.00 - 6.90 %    Basophils 0.20 0.00 - 1.80 %    Immature Granulocytes 0.90 0.00 - 0.90 %    Nucleated RBC 0.00 /100 WBC    Neutrophils (Absolute) 5.24 2.00 - 7.15 K/uL    Lymphs (Absolute) 0.64 (L) 1.00 - 4.80 K/uL    Monos (Absolute) 0.59 0.00 - 0.85 K/uL    Eos (Absolute) 0.01 0.00 - 0.51 K/uL    Baso (Absolute) 0.01 0.00 - 0.12 K/uL    Immature Granulocytes (abs) 0.06 0.00 - 0.11 K/uL    NRBC (Absolute) 0.00 K/uL   Comp Metabolic Panel (CMP): Tomorrow AM    Collection Time: 08/29/22  4:45 AM   Result Value Ref Range    Sodium 135 135 - 145 mmol/L    Potassium 4.5 3.6 - 5.5 mmol/L    Chloride 100 96 - 112 mmol/L    Co2 24  20 - 33 mmol/L    Anion Gap 11.0 7.0 - 16.0    Glucose 167 (H) 65 - 99 mg/dL    Bun 17 8 - 22 mg/dL    Creatinine 0.60 0.50 - 1.40 mg/dL    Calcium 7.3 (L) 8.5 - 10.5 mg/dL    AST(SGOT) 28 12 - 45 U/L    ALT(SGPT) 16 2 - 50 U/L    Alkaline Phosphatase 128 (H) 30 - 99 U/L    Total Bilirubin 0.4 0.1 - 1.5 mg/dL    Albumin 2.8 (L) 3.2 - 4.9 g/dL    Total Protein 5.0 (L) 6.0 - 8.2 g/dL    Globulin 2.2 1.9 - 3.5 g/dL    A-G Ratio 1.3 g/dL   Magnesium: Every Monday and Thursday AM    Collection Time: 08/29/22  4:45 AM   Result Value Ref Range    Magnesium 1.6 1.5 - 2.5 mg/dL   Phosphorus: Every Monday and Thursday AM    Collection Time: 08/29/22  4:45 AM   Result Value Ref Range    Phosphorus 2.3 (L) 2.5 - 4.5 mg/dL   IRON/TOTAL IRON BIND    Collection Time: 08/29/22  4:45 AM   Result Value Ref Range    Iron 12 (L) 40 - 170 ug/dL    Total Iron Binding 147 (L) 250 - 450 ug/dL    Unsat Iron Binding 135 110 - 370 ug/dL    % Saturation 8 (L) 15 - 55 %   ESTIMATED GFR    Collection Time: 08/29/22  4:45 AM   Result Value Ref Range    GFR (CKD-EPI) 101 >60 mL/min/1.73 m 2   POCT glucose device results    Collection Time: 08/29/22 10:55 AM   Result Value Ref Range    POC Glucose, Blood 154 (H) 65 - 99 mg/dL       Fluids    Intake/Output Summary (Last 24 hours) at 8/29/2022 1225  Last data filed at 8/29/2022 1000  Gross per 24 hour   Intake 3780 ml   Output 250 ml   Net 3530 ml       Core Measures & Quality Metrics  Labs reviewed and Radiology images reviewed  Holbrook catheter: No Holbrook      DVT Prophylaxis: Contraindicated - High bleeding risk  DVT prophylaxis - mechanical: SCDs  Ulcer prophylaxis: Yes      RAP Score Total: 0  ETOH Screening    Assessment/Plan  Subdural hematoma, post-traumatic (HCC)- (present on admission)  Assessment & Plan  Right holohemispheric subdural hemorrhage with subacute to chronic components in a parasagittal location overlying the right parietal region measuring up to 8 mm in depth.  Follow up head CT  stable.  9/4 Follow up CT.  Non-operative management.    Post traumatic pharmacologic seizure prophylaxis for 1 week.  Speech Language Pathology cognitive evaluation.  Maxwell Okeefe MD. Neurosurgeon. [facility]. (sign off 8/28)    Displaced fracture of right femoral neck (HCC)- (present on admission)  Assessment & Plan  Acute angulated fracture of the right femoral neck.   8/28 Right hip hemiarthroplasty  Weight bearing status - Weightbearing as tolerated RLE.  Anupam Ferrer MD. Orthopedic Surgery.    Acute blood loss anemia- (present on admission)  Assessment & Plan  Transfuse 1 unit PRBC if Hb < 7.0  8/29 Iron studies low - replace per protocol.    Platelet dysfunction (HCC)- (present on admission)  Assessment & Plan  Marked AA pathway platelet inhibition on admission platelet mapping thromoboelastography.  One unit of platelets administered.  8/28 Follow up platelet mapping with persistent AA inhibition. One unit of platelets administered.     Frequent falls- (present on admission)  Assessment & Plan  Multiple over the preceding year while in the care of her   PT/OT ordered to assess mobility needs.   consult placed for safety concerns     Contraindication to deep vein thrombosis (DVT) prophylaxis- (present on admission)  Assessment & Plan  Prophylactic anticoagulation for thrombotic prevention initially contraindicated secondary to elevated bleeding risk.  8/28 Trauma surveillance venous duplex scanning ordered.     Primary pulmonary hypertension (HCC)- (present on admission)  Assessment & Plan  Chronic condition treated with tadalafil, ambrisentan.  Resumed maintenance medication on admission.   3/2022 ECHO without evidence of pulmonary hypertension, normal right atrial and ventricular pressures.     Chronic nausea- (present on admission)  Assessment & Plan  Chronic condition treated with phenergan, zofran, reglan.  Resumed maintenance medication on admission.      GERD (gastroesophageal reflux disease)- (present on admission)  Assessment & Plan  Chronic condition treated with Omeprazole.  Resumed maintenance medication on admission.     Chronic pain syndrome- (present on admission)  Assessment & Plan  Chronic condition treated with MS Contin and Oxycodone.  Resumed maintenance medication on admission.     Other hyperlipidemia- (present on admission)  Assessment & Plan  Chronic condition treated with pravastatin.  Resumed maintenance medication on admission.     History of liver transplant (HCC)- (present on admission)  Assessment & Plan  Admit ALP mildly elevated, other LFTs normal  Chronic condition treated with tacrolimus, cellcept, furosemide, fludrocortisone, and K-dur  Resumed maintenance medication on admission.     Discharge planning issues- (present on admission)  Assessment & Plan  Date of admission: 8/27/2022.  Transfer orders from SICU to be determined.  8/28 Rehab referral.  Cleared for discharge: No.  Discharge delayed: No.  Discharge date: tbd.    Right elbow pain- (present on admission)  Assessment & Plan  Right elbow pain on tertiary exam.    Diagnostic imaging negative for fracture.    Trauma- (present on admission)  Assessment & Plan  Fell off a bed. Unknown loss of consciousness.  Non Trauma Activation.  Andriy Robertson MD. Trauma Surgery.     DUC (generalized anxiety disorder)- (present on admission)  Assessment & Plan  Chronic condition treated with clonazepam.  Resumed maintenance medication on admission.     Hypothyroidism- (present on admission)  Assessment & Plan  Chronic condition treated with Levothyroxine.  Resumed maintenance medication on admission.         Discussed patient condition with Hospitalist, Family, RN, RT, Pharmacy, and trauma surgery.  CRITICAL CARE TIME EXCLUDING PROCEDURES: 35    minutes

## 2022-08-29 NOTE — CARE PLAN
The patient is Stable - Low risk of patient condition declining or worsening    Shift Goals  Clinical Goals: Pain control, tolerate edge of bed  Patient Goals: Pain control  Family Goals: N/A    Progress made toward(s) clinical / shift goals:    Problem: Pain - Standard  Goal: Alleviation of pain or a reduction in pain to the patient’s comfort goal  Outcome: Progressing  Note: Patient ger to rate pain on a 0/10 scale. Pain being controlled with prn pain medication, repositioning, cold packs, and rest.       Problem: Skin Integrity  Goal: Skin integrity is maintained or improved  Outcome: Progressing  Note: Q2 turns with pillows, frequently checked/changed for incontinence, mepilex on all bony prominences, pillows for support/positioning.     Problem: Fall Risk  Goal: Patient will remain free from falls  Outcome: Progressing  Note: Educated on fall risk and ensured fall precautions in place. Bed in lowest position, treaded socks in place, call light in reach, bed alarm in place, room free of clutter.        Patient is not progressing towards the following goals:

## 2022-08-29 NOTE — RESPIRATORY CARE
"COPD EDUCATION by COPD CLINICAL EDUCATOR  8/29/2022 at 10:16 AM by Mesha Miller, RRT     Patient interviewed by education team. She is an established patient of [facility] . She has complex sleep apnea,  PHTN, Sarcoidosis that is well managed. Her last PFT in EMR 8/11/2016 shows a mixed obstructive/Restrictive pattern. She uses Oxygen and no CPAP currently.  An Action Plan was completed in the EMR to reflect current Respiratory Medication use.                  COPD Screen  COPD Risk Screening  Do you have a history of COPD?: Yes  Do you have a Pulmonologist?: Yes    COPD Assessment  COPD Clinical Specialists ONLY  COPD Education Initiated: Yes--Short Intervention (NMet withPt Long standing patient of [facility] or [facility],PHTN and Sarcoidosis Last PFT shows mixed Obstructive/Restrictive in  2016 with no repeat.)  DME Company: Preferred  DME Equipment Type: 4-6 lpm Oxygen (AutoPAP in Past)  Pulmonologist Name: [facility] Team declined appt eduardo has seen [facility] as well  Referrals Initiated:  (declined)  Is this a COPD exacerbation patient?: No    PFT Results  Pulmonary Function Testing: Yes 8/11/2016 no Data available but states combined obstructive/ restrictive process    Meds to Beds  Would the patient like to opt in for Bedside Medication Delivery at Discharge?: No     MY COPD ACTION PLAN     It is recommended that patients and physicians /healthcare providers complete this action plan together. This plan should be discussed at each physician visit and updated as needed.    The green, yellow and red zones show groups of symptoms of COPD. This list of symptoms is not comprehensive, and you may experience other symptoms. In the \"Actions\" column, your healthcare provider has recommended actions for you to take based on your symptoms.    Patient Name: Roxana Cuba   YOB: 1960   Last Updated on:     Green Zone:  I am doing well today Actions     Usual activitiy and exercise " "level   Take daily medications     Usual amounts of cough and phlegm/mucus   Use oxygen as prescribed     Sleep well at night   Continue regular exercise/diet plan     Appetite is good   At all times avoid cigarette smoke, inhaled irritants     Daily Medications (these medications are taken every day):                Yellow Zone:  I am having a bad day or a COPD flare Actions     More breathless than usual   Continue daily medications     I have less energy for my daily activities   Use quick relief inhaler as ordered     Increased or thicker phlegm/mucus   Use oxygen as prescribed     Using quick relief inhaler/nebulizer more often   Get plenty of rest     Swelling of ankles more than usual   Use pursed lip breathing     More coughing than usual   At all times avoid cigarette smoke, inhaled irritants     I feel like I have a \"chest cold\"     Poor sleep and my symptoms woke me up     My appetite is not good     My medicine is not helping      Call provider immediately if symptoms don’t improve     Continue daily medications, add rescue medications:   Albuterol 2 Puffs Every 4 hours PRN       Medications to be used during a flare up, (as Discussed with Provider):              Red Zone:  I need urgent medical care Actions     Severe shortness of breath even at rest   Call 911 or seek medical care immediately     Not able to do any activity because of breathing      Fever or shaking chills      Feeling confused or very drowsy       Chest pains      Coughing up blood                  "

## 2022-08-29 NOTE — ASSESSMENT & PLAN NOTE
Status post surgery by Dr. Ferrer on 8/28  Weight-bearing as tolerated  Pain control  PT and OT recs post-acute; snf referra in place  SCDs for DVT prophylaxis until 7 days after admission at which time she may have Lovenox 40 mg daily if she is not ambulatory (per Dr. Okeefe).

## 2022-08-29 NOTE — THERAPY
Physical Therapy   Initial Evaluation     Patient Name: Roxana Cuba  Age:  62 y.o., Sex:  female  Medical Record #: [id number]  Today's Date: 8/29/2022     Precautions  Precautions: Fall Risk;Weight Bearing As Tolerated Right Lower Extremity;Posterior Hip Precautions  Comments: SDH    Assessment  Patient is 62 y.o. female admitted post fall out of bed sustaining right femoral neck fracture and SDH. POD #1 right hip hemiarthroplasty. Attempted to educate pt on posterior hip precautions but pt not receptive due to level of pain. Max assist required for supine<>sitting. Pt refused further mobility despite education and encouragement. PT will cont while pt is in acute care setting to address pain, strength, ROM, balance, activity tolerance, and mobility.     Plan    Recommend Physical Therapy 3 times per week until therapy goals are met for the following treatments:  Bed Mobility, Gait Training, Neuro Re-Education / Balance, Self Care/Home Evaluation, Therapeutic Activities, and Therapeutic Exercises    DC Equipment Recommendations: Unable to determine at this time  Discharge Recommendations: Recommend post-acute placement for additional physical therapy services prior to discharge home          08/29/22 1042   Vitals   O2 (LPM) 2   O2 Delivery Device Silicone Nasal Cannula   Prior Living Situation   Prior Services Home-Independent   Housing / Facility 2 Story House   Steps Into Home 0   Steps In Home 14   Equipment Owned Front-Wheel Walker;Single Point Cane   Lives with - Patient's Self Care Capacity Spouse   Comments pt states she can stay on the first floor where her bed and bath are   Prior Level of Functional Mobility   Bed Mobility Independent   Transfer Status Independent   Ambulation Independent   Distance Ambulation (Feet)   (community)   Assistive Devices Used Single Point Cane   Comments pt sometimes uses FWW depending on the day   History of Falls   History of Falls Yes   Cognition    Cognition /  Consciousness X   Level of Consciousness Alert   Comments very tearful throughout session   Active ROM Lower Body    Active ROM Lower Body  X   Comments R knee flexion/extension limtied by pain, did not assess R hip   Strength Lower Body   Lower Body Strength  X   Comments R LE not assessed due to pain   Sensation Lower Body   Lower Extremity Sensation   WDL   Balance Assessment   Sitting Balance (Static) Fair -   Sitting Balance (Dynamic) Poor +   Weight Shift Sitting Poor   Comments EOB only   Gait Analysis   Gait Level Of Assist Unable to Participate   Weight Bearing Status WBAT R LE   Bed Mobility    Supine to Sit Maximal Assist   Sit to Supine Maximal Assist   Scooting Maximal Assist   Rolling Maximal Assist to Rt.;Maximum Assist to Lt.   Functional Mobility   Sit to Stand Refused   Bed, Chair, Wheelchair Transfer Refused   Mobility EOB only, pt refused further   Patient / Family Goals    Patient / Family Goal #1 make the pain stop   Short Term Goals    Short Term Goal # 1 pt will be able to complete supine<>Sitting from flat bed with min assist in 6tx in order to progress to home   Short Term Goal # 2 pt will be able to complete functional transfers with FWW and min assist in 6tx in order to progress with PT   Short Term Goal # 3 pt will be able to ambulate 50ft with FWW and min assist in 6tx in order to progress to home   Education Group   Education Provided Role of Physical Therapist;Weight Bearing Status;Hip Precautions Posterior   Hip Precautions Posterior Patient Response Patient;Nonacceptance;Explanation;Demonstration;No Learning Evidence   Role of Physical Therapist Patient Response Patient;Acceptance;Demonstration;Explanation;Action Demonstration   Weight Bearing Status Patient Response Patient;Acceptance;Nonacceptance;Explanation;Action Demonstration   Anticipated Discharge Equipment and Recommendations   DC Equipment Recommendations Unable to determine at this time   Discharge Recommendations  Recommend post-acute placement for additional physical therapy services prior to discharge home

## 2022-08-29 NOTE — THERAPY
"Speech Language Pathology   Initial Assessment     Patient Name: Roxana Cuba  AGE:  62 y.o., SEX:  female  Medical Record #: [id number]  Today's Date: 2022       Precautions: Fall Risk, Weight Bearing As Tolerated Right Lower Extremity, Posterior Hip Precautions  Comments: SDH    HPI: Patient is 62 y.o. female admitted 22 for right femoral neck fracture and a subdural hematoma status post fall out of bed.    CT of head 22:  -Mixed density hemispheric right subdural hematoma is similar in size and appearance measuring up to 8 mm in thickness. -Mild localized mass effect.   -No intraparenchymal hemorrhage.   -Cerebral atrophy and chronic microvascular ischemic changes are again noted.   -Basilar cisterns are patent.   -Postsurgical changes of left-sided craniotomy.   -Paranasal sinuses and mastoids are clear.    Subjective  Patient was seen on this date for a cognitive-linguistic evaluation. Spoke with spouse at the end of the session who reported pt is confused at baseline, recent dx of dementia per neurologist, and hx of craniotomy ~1 year ago. Spouse assists pt with all IADLs at baseline. Pt last seen by SLP for dysphagia in May 2022 w/ recs for regular diet. It was noted during that time that pt was oriented to \"Self, , General place, Current year.\"    Assessment  Portions of the COGNISTAT (Neurobehavioral Cognitive Status Assessment) and other measures were administered. Patient scored the following on the Cognistat:  Orientation: SEVERE  Attention: MODERATE  Comprehension (Language): SEVERE   Repetition (Language): SEVERE   Naming (Language): MILD  Memory: UNABLE TO ASSESS  Calculations: MODERATE  Similarities (Reasoning): SEVERE  Judgment (Reasoning): MODERATE    Patient reported she was in an \"[place]\" and the year was \".\" Attention to task significantly impaired - unable to follow through on task and required repeated instructions to complete tasks. Unable to immediate recall 4 words " "for STM task. Pt intermittently stating testing was making her \"anxious.\"     Further testing revealed severe impairments with clock drawing (no numbers placed, 4 lines drawn across Craig before abandoning task). Illegible micrographic handwriting noted during writing assessment. Pt was able to read a sentence out loud but unable to follow written instructions and becoming increasingly anxious.     Recommendations  Per spouse, baseline cognitive impairments with hx of craniotomy and reported dx of dementia. SLP following to trial cognitive therapy given acute SDH and determine carryover of functional strategies. Pt will require direct assistance with IADLs if d/c home.       Plan  Recommend Speech Therapy 3 times per week until therapy goals are met for the following treatments:  Cognitive-Linguistic Training and Patient / Family / Caregiver Education.    Discharge Recommendations: Recommend home health for continued speech therapy services       08/29/22 1423   Vitals   O2 (LPM) 2   O2 Delivery Device Silicone Nasal Cannula   Short Term Goals   Short Term Goal # 1 Patient will be AAOx4 across 3 consecutive sessions given min cues to visual aids.   Short Term Goal # 2 Patient will sustain attention to task for 20 min given min redirection to task.     "

## 2022-08-30 NOTE — DISCHARGE PLANNING
LSW attempted to meet with pt at bedside. Pt appeared very sleepy. LSW offered to come back later or call the pt's spouse. Pt asked that this LSW call the pt's spouse. LSW left a voicemail message with the spouse requesting a call back.

## 2022-08-30 NOTE — DISCHARGE PLANNING
Received Choice form at 1016  Agency/Facility Name: [facility]   Referral sent per Choice form @ 1158     Received Choice form at 1135  [facility]   Referral sent per Choice form @ 1235     Received Choice form at 1140  Agency/Facility Name: [facility]  Referral sent per Choice form @ 1240     @1445  Agency/Facility Name: [facility]  Spoke To: Daryl  Outcome: Daryl stated some notes on pt's cognition are contradictory. Trying to determine what the pt's baseline function is in terms of mental state. ALVIN informed LSW and she will contact family to gather more information on baseline.     @[id number]  Agency/Facility Name: [facility]  Outcome: Left a voicemail returning Aravind's call. Waiting for callback.     @1529  Agency/Facility Name: [facility]  Spoke To: Aravind  Outcome: Aravind asking if the pt's family can bring her immune suppressant medication to her, and if they can continue to supply it while she is admitted in SNF. ALVIN informed LSW.

## 2022-08-30 NOTE — DISCHARGE PLANNING
Case Management Discharge Planning    Admission Date: 8/27/2022  GMLOS: 6  ALOS: 3    6-Clicks ADL Score: 15  6-Clicks Mobility Score: 7  PT and/or OT Eval ordered: Yes  Post-acute Referrals Ordered: Yes  Post-acute Choice Obtained: Yes  Has referral(s) been sent to post-acute provider:  Yes      Anticipated Discharge Dispo: Discharge Disposition: Disch to IP rehab facility or distinct part unit (62)    DME Needed: No    Action(s) Taken: Choice obtained    @8099 LSW called received a call back from the pt's spouse. Pt's spouse stated he will call his daughter regarding IRF and SNF choice forms.       Thania RANGEL RN [facility] was [facility] by [facility]. LSW obtained choice via telephone from daughter Amanda Flowers. Pt's daughter gave choice for either IRF [facility] or all SNF in the [place] area. Choice forms faxed to [facility]. Pt's daughter stated it is unlikely that pt will agree to go, pt has declined to go to rehab in the past.     Escalations Completed: None    Medically Clear: No    Next Steps: LSW will follow up with the family regarding Rehab vs SNF acceptance.     Barriers to Discharge: Pending Placement    Is the patient up for discharge tomorrow: No

## 2022-08-30 NOTE — PROGRESS NOTES
Trauma / Surgical Daily Progress Note    Date of Service  8/30/2022    Chief Complaint  62 y.o. female admitted 8/27/2022 with injury    Interval Events  ICU transfer  Vitals unchanged  Hgb 6 this morning, 1U PRBC ordered  States she is miserable - in pain, doesn't want to be in the hospital    Vital Signs  Temp:  [35.8 °C (96.5 °F)-37.4 °C (99.4 °F)] 36.3 °C (97.3 °F)  Pulse:  [] 104  Resp:  [] 15  BP: (114-141)/(60-76) 116/68  SpO2:  [92 %-100 %] 96 %    Physical Exam  Physical Exam  Vitals and nursing note reviewed.   Constitutional:       General: She is not in acute distress.     Appearance: She is not toxic-appearing.      Comments: Deconditioned.  Appears older than stated age.   HENT:      Head: Normocephalic.      Right Ear: External ear normal.      Left Ear: External ear normal.      Nose: Nose normal.      Mouth/Throat:      Mouth: Mucous membranes are moist.   Eyes:      General: No scleral icterus.     Extraocular Movements: Extraocular movements intact.      Pupils: Pupils are equal, round, and reactive to light.   Cardiovascular:      Rate and Rhythm: Tachycardia present.      Pulses: Normal pulses.      Heart sounds: Normal heart sounds.   Pulmonary:      Effort: Pulmonary effort is normal. No tachypnea, accessory muscle usage or respiratory distress.      Breath sounds: Examination of the right-lower field reveals decreased breath sounds. Examination of the left-lower field reveals decreased breath sounds. Decreased breath sounds present. No wheezing.   Chest:      Chest wall: No tenderness.   Abdominal:      General: There is no distension.      Palpations: Abdomen is soft.      Tenderness: There is no abdominal tenderness.   Musculoskeletal:         General: No swelling.      Cervical back: Normal range of motion.      Right upper leg: Swelling and tenderness present.   Skin:     General: Skin is warm and dry.      Capillary Refill: Capillary refill takes less than 2 seconds.       Coloration: Skin is not jaundiced.   Neurological:      Mental Status: She is alert.      GCS: GCS eye subscore is 4. GCS verbal subscore is 4. GCS motor subscore is 6.   Psychiatric:      Comments: Unable to assess         Core Measures & Quality Metrics  Labs reviewed and Medications reviewed  Holbrook catheter: No Holbrook      DVT Prophylaxis: Contraindicated - High bleeding risk  DVT prophylaxis - mechanical: SCDs  Ulcer prophylaxis: Not indicated          Assessment/Plan  1) Post-Traumatic Acute Blood Loss Anemia:    Anemia multifactorial - mildly low on admit, acute blood loss from Ortho surgery, hemodilution from IV fluids & transfusions.  Transfused 1U PRBC.  Receiving iron supplementation.  Doubt ongoing hemorrhage at this time.  8/28 platelet mapping showed significant AA/ADP inhibition.  Received 2U platelets total since admit.  Will send a follow up TEG/PM.  Follow up Hgb at 1500 today.    No further recs from Trauma Surgery at this time.    I independently reviewed pertinent clinical lab tests from the last 48 hours and ordered additional follow up clinical lab tests.  I independently reviewed pertinent radiographic images and the radiologist's reports from the last 48 hours and ordered additional follow up radiographic studies.  The details of the available patient records in [facility] (including laboratory tests, culture data, medications, imaging, and other pertinent diagnostic tests) and that information was utilized as warranted in today's medical decision making for this patient.  I personally evaluated the patient condition at bedside.    Care interventions include:   Review of interval medical and surgical history, current medications and outpatient medication reconciliation, interval imaging studies and radiologist interpretation, and interval laboratory values.  Management of anemia.    Aggregated care time spent evaluating, reassessing, reviewing documentation, providing care, and managing this patient  exclusive of procedures: 35 minutes    Stephen Owusu MD, FACS

## 2022-08-30 NOTE — CARE PLAN
The patient is Watcher - Medium risk of patient condition declining or worsening    Shift Goals  Clinical Goals: pain control and safety  Patient Goals: pain control  Family Goals: No family present    Progress made toward(s) clinical / shift goals:  Plan of care discussed.Educated on fall prevention,call light within reach.Encouraged to call for needs.Hourly rounding done.Needs reinforcement.  Problem: Pain - Standard  Goal: Alleviation of pain or a reduction in pain to the patient’s comfort goal  Outcome: Progressing     Problem: Knowledge Deficit - Standard  Goal: Patient and family/care givers will demonstrate understanding of plan of care, disease process/condition, diagnostic tests and medications  Outcome: Progressing     Problem: Skin Integrity  Goal: Skin integrity is maintained or improved  Outcome: Progressing     Problem: Fall Risk  Goal: Patient will remain free from falls  Outcome: Progressing       Patient is not progressing towards the following goals:

## 2022-08-30 NOTE — DIETARY
Nutrition services: Day 3 of admit.  Roxana Cuba is a 62 y.o. female admitted after falling out of bed, subdural hematoma, encephalopathy, s/p Right hip hemiarthroplasty  (8/28)  History includes liver transplant on chronic immunosuppressive therapy, hepatopulmonary pulmonary hypertension, seizure disorder, chronic pain syndrome     Patient with low BMI noted on admit screen. Met with patient at bedside.  She was tearful and said she has eaten much for the past week.  She acknowledged weight loss but did not know how much.  She stated she just doesn't feel like eating anything.  She did not want supplements and couldn't think of anything she would want added to meal trays.  Discussed the possibility of enteral feeds with patient, she stated she has had a feeding tube in the past and does not want that at this time.    Assessment:  Weight: 53 kg (116 lb 13.5 oz)  Body mass index is 17.25 kg/m².Underweight  Diet/Intake: Consistent carbohydrate    Evaluation:   Weight has trended downwards for the past six months.  Pertinent Medications include:  Insulin, Lasix, Reglan, vitamin C, multivitamin with minerals    Malnutrition Risk: Chronic disease related severe malnutrition as evidenced by 14% weight loss in less than six months, likely <75% of estimated needs for > 1 month, noted depressed temple region and severe fat and severe muscle wasting on arms.    Recommendations/Interventions/Plan:    Consider appetite stimulant.  D/W MD.  Encourage intake of meals  Document intake of all PO as % taken in ADL's to provide interdisciplinary communication across all shifts.   Monitor weight.  Nutrition rep will continue to see patient for ongoing meal and snack preferences.     RD following

## 2022-08-30 NOTE — DISCHARGE PLANNING
Per physiatry patient is not a candidate for IPR, recommending SNF placement. TCC will no longer follow, please call with any questions [phone].

## 2022-08-30 NOTE — PROGRESS NOTES
Hgb 6.0 contacted Hospitalist. 1 unit RBC started  1829  Pt redraw hgb 6.6. Contacted Hospitalist. Waiting for orders.  1845 Per Hospitalist have NOC RN call back after pt completes Iron infusion for redraw hgb.

## 2022-08-30 NOTE — PROGRESS NOTES
Hospital Medicine Daily Progress Note  Date of Service  8/30/2022     Chief Complaint  Roxana Cuba is a 62 y.o. female admitted 8/27/2022 with        Chief Complaint   Patient presents with    Hip Pain       right    GLF       Rolled out of bed        Hospital Course  This is a 62-year-old female with a past medical history significant for liver transplant, on chronic immunosuppressive medication, pulmonary hypertension, seizure disorder, chronic pain syndrome generalized anxiety,s/p bariatric surgery, presented to the ER on/20 7/2022 after she had a ground-level fall.     She reports that she fell out of her bed and landed on the right hip.  CT head showed right holohemispheric acute to chronic subdural hematoma . Dr Okeefe of neurosurgery has evaluated the patient, stated no surgical intervention.  She was initially admitted to the trauma service to the ICU    She was also found to have acute angulated fracture of the right femoral neck, orthopedic surgery was consulted,, patient underwent right hip hemiarthroplasty on 8/20/2022 by Dr. Ferrer     Per request of hepatologist, GGT and Prograf level has been ordered.  PT/OT has evaluate the patient, recommended postacute, physiatry has consulted.    [employer] discussed with Dr. Okeefe who recommended refraining Lovenox until at least 7 days post-fall     Interval events:  -- No acute events overnight, vital signs NSR, heart rate 91-1 04, blood pressure 102/56, saturating 95% on 2 L of oxygen.  Patient continued to complain of headache.  Stated that she did not want to stay in the hospital.  --In the last 24 hours, she has received 50 mg of oxycodone, 30 mg of morphine sulfate and 0.5 mg of Dilaudid.  We will try muscle relaxant  --She is noted to be anemic, will provide blood transfusion  PT/OT has evaluate the patient medical and postacute. She has been declined by  Rehab , will place skilled nursing referral      I have discussed this patient's plan of care  and discharge plan at IDT rounds today with Case Management, Nursing, Nursing leadership, and other members of the IDT team.     Consultants/Specialty  trauma surgery     Code Status  Full Code     Disposition  Patient is not medically cleared for discharge.   Anticipate discharge to to skilled nursing facility.  I have placed the appropriate orders for post-discharge needs.     Review of Systems  Review of Systems   Constitutional:  Positive for malaise/fatigue. Negative for fever.   HENT:  Negative for congestion and sore throat.    Eyes:  Negative for blurred vision and double vision.   Respiratory:  Negative for cough, hemoptysis and sputum production.    Cardiovascular:  Negative for chest pain, palpitations and orthopnea.   Gastrointestinal:  Negative for abdominal pain, heartburn, nausea and vomiting.   Genitourinary:  Negative for dysuria.   Musculoskeletal:  Positive for myalgias.   Neurological:  Positive for headaches. Negative for dizziness and focal weakness.   Psychiatric/Behavioral:  Negative for depression. The patient is nervous/anxious.    All other systems reviewed and are negative.      Physical Exam  Temp:  [35.8 °C (96.5 °F)-37.3 °C (99.2 °F)] 37.1 °C (98.7 °F)  Pulse:  [] 93  Resp:  [] 16  BP: (102-141)/(55-76) 102/56  SpO2:  [92 %-99 %] 95 %     Physical Exam  Vitals and nursing note reviewed.   Constitutional:       Appearance: Normal appearance.   HENT:      Head: Normocephalic and atraumatic.   Eyes:      Extraocular Movements: Extraocular movements intact.      Pupils: Pupils are equal, round, and reactive to light.   Cardiovascular:      Rate and Rhythm: Normal rate.      Pulses: Normal pulses.   Pulmonary:      Effort: No respiratory distress.      Breath sounds: Normal breath sounds.   Abdominal:      General: Bowel sounds are normal. There is no distension.      Palpations: Abdomen is soft.      Tenderness: There is no abdominal tenderness.   Musculoskeletal:      Cervical  back: Neck supple.   Skin:     General: Skin is warm.   Neurological:      Mental Status: She is alert and oriented to person, place, and time.      Cranial Nerves: No cranial nerve deficit.   Psychiatric:         Mood and Affect: Mood normal.         Fluids  No intake or output data in the 24 hours ending 08/30/22 1410     Laboratory        Recent Labs     08/28/22  0420 08/29/22 0445 08/30/22  0826   WBC 5.0 6.6 6.0   RBC 3.10* 2.46* 1.96*   HEMOGLOBIN 9.6* 7.7* 6.0*   HEMATOCRIT 28.5* 23.3* 18.2*   MCV 91.9 94.7 92.9   MCH 31.0 31.3 30.6   MCHC 33.7 33.0* 33.0*   RDW 46.5 48.2 49.1   PLATELETCT 114* 164 139*   MPV 9.9 9.6 8.9*            Recent Labs     08/28/22  0420 08/29/22  0445 08/30/22  0827   SODIUM 139 135 132*   POTASSIUM 3.7 4.5 4.0   CHLORIDE 104 100 98   CO2 27 24 26   GLUCOSE 142* 167* 170*   BUN 21 17 15   CREATININE 0.66 0.60 0.48*   CALCIUM 8.0* 7.3* 7.2*          Recent Labs     08/27/22  1827   APTT 28.7   INR 1.05              Imaging  MD-ELOFEBL-1 VIEW   Final Result      No evidence of bowel obstruction.                  IR-US GUIDED PIV   Final Result    Ultrasound-guided PERIPHERAL IV INSERTION performed by    qualified nursing staff as above.      US-TRAUMA VEIN SCREEN LOWER BILAT EXTREMITY   Final Result      DX-PELVIS-1 OR 2 VIEWS   Final Result         1. Status post right hip arthroplasty without evidence of early hardware complication.      DX-ELBOW-LIMITED 2- RIGHT   Final Result      1.  No acute fracture seen on these 2 views of the right elbow.   2.  There is evidence of an old healed fracture of the proximal ulna..      CT-HEAD W/O   Final Result      No significant interval change.         CT-HEAD W/O   Final Result      1.  Right holohemispheric mixed density extra-axial hemorrhage with subacute to chronic components in a parasagittal location and overlying the high right parietal region measuring up to 8 mm in depth. There are also acute components more inferiorly with    a  biconvex focal area seen overlying the right posterior parietal region measuring 8 mm in depth. This likely represents subdural hemorrhage however the biconvex focus of hemorrhage could represent an epidural component.      2.  No significant mass effect or midline shift.      3.  Atrophy.      4.  Surgical change involving the left calvarium.      5.  This was discussed with ANH CONLEY at 7:06 PM on 8/27/2022.         DX-KNEE 2- RIGHT   Final Result         1. No acute osseous abnormality but evaluation is limited due to osseous demineralization.      DX-HIP-UNILATERAL-WITH PELVIS-1 VIEW RIGHT   Final Result         Acute angulated fracture of the right femoral neck      DX-CHEST-PORTABLE (1 VIEW)   Final Result      No evidence of acute cardiopulmonary process.           Assessment/Plan  * Subdural hematoma, post-traumatic (HCC)- (present on admission)  Assessment & Plan  She has a previous history of craniotomy 8/21  Dr. Okeefe neurosurgery was consulted and recommends medical management.   Refrain from Lovenox for 7 days after the fall and okay to start if she requires DVT prophylaxis.   Refrain from NSAIDs.     Seizure disorder (HCC)- (present on admission)  Assessment & Plan  She is on Keppra 750 mg BID and Vimpat 100 mg BID outpatient both of which have been continued.     Encephalopathy acute- (present on admission)  Assessment & Plan  This may be a combination of subdural hematoma, ICU delirium, and post-anesthesia   Resolved on 8/30   -- Able to answer alert and oriented questions    Acute blood loss anemia- (present on admission)  Assessment & Plan  Baseline Hb is 14.2 and now Hb is at 6   -- s/p 1 unit of prbc transfusion    -- IV iron  Monitor     Frequent falls- (present on admission)  Assessment & Plan  Needs PT and OT    Trauma- (present on admission)  Assessment & Plan  She was admitted to the trauma service  She may be an excellent candidate for inpatient rehab prior to discharge home as she  had PT/OT needs and is very medically complex with active, acute problems (liver transplant, subdural hematoma, acute encephalopathy, pain control).    Displaced fracture of right femoral neck (HCC)- (present on admission)  Assessment & Plan  Status post surgery by Dr. Ferrer on 8/28  Weight-bearing as tolerated  Pain control  PT and OT recs post-acute   SCDs for DVT prophylaxis until 7 days after admission at which time she may have Lovenox 40 mg daily if she is not ambulatory (per Dr. Okeefe).    Primary pulmonary hypertension (HCC)- (present on admission)  Assessment & Plan  Followed by Dr. Phan cardiology  Continue Ambrisentan and Cialis    Chronic nausea- (present on admission)  Assessment & Plan  Zofran prn    S/P bariatric surgery- (present on admission)  Assessment & Plan  Hx of    GERD (gastroesophageal reflux disease)- (present on admission)  Assessment & Plan  Continue PPI      Chronic pain syndrome- (present on admission)  Assessment & Plan  She is on outpatient MS Contin 15 mg BID and oxycodone 10 mg BID. These have been continued.     History of liver transplant (HCC)- (present on admission)  Assessment & Plan  Continue outpatient Cellcept and Prograf  Dr. Tello her hepatologist has requested a Prograf level and GGT which have been ordered.    DUC (generalized anxiety disorder)- (present on admission)  Assessment & Plan  Continue Zoloft 150 mg daily  She was on Klonopin 0.5 mg TID at home and will continue     Hypothyroidism- (present on admission)  Assessment & Plan  Continue synthroid 137 mcg daily       VTE prophylaxis: SCDs/TEDs

## 2022-08-30 NOTE — PROGRESS NOTES
0600 4 Eyes Skin Assessment Completed by PENNY Valle and PENNY Blunt.    Head Bruising right forehead  Ears Redness and Blanching  Nose WDL  Mouth WDL  Neck Bruising left side  Breast/Chest WDL  Shoulder Blades WDL  Spine WDL  (R) Arm/Elbow/Hand Redness and Blanching bruising  (L) Arm/Elbow/Hand Redness, Blanching, and Bruising  Abdomen WDL  Groin WDL  Scrotum/Coccyx/Buttocks WDL  (R) Leg Bruising and Swelling skin tear on knee  (L) Leg Bruising scattered  (R) Heel/Foot/Toe WDL  (L) Heel/Foot/Toe WDL          Devices In Places Blood Pressure Cuff, Pulse Ox, and Nasal Cannula purewick      Interventions In Place Gray Ear Foams, Heel Mepilex, Pillows, Heels Loaded W/Pillows, and Pressure Redistribution Mattress    Possible Skin Injury No    Pictures Uploaded Into Epic N/A  Wound Consult Placed N/A  RN Wound Prevention Protocol Ordered No

## 2022-08-30 NOTE — PROGRESS NOTES
Pt arrived to unit from ICU. Pt denies chest pain and SOB. VS stable on 3L NC. Assessment completed. Surgical dressing to right hip with prevena in place. Pt rating pain 4/10 declines interventions. Pt denies nausea, tolerating sips of water. Pt oriented to room and call light. Discussed POC, SCDs on, Pt educated to call for assistance.

## 2022-08-30 NOTE — DISCHARGE PLANNING
[facility]P TCN chart review completed. Collaborated with SEGUNDO Marcos prior to meeting with the pt. The most current review of medical record, knowledge of pt's PLOF and social support, LACE+ score of (79), 6 clicks scores of ( none entered)  mobility were considered.  Mbr is now in T3 from ICU.    Pt seen at bedside, somnolent, arousable, oriented x1-- unable to participate in education and meaningful conversation at this time.  Given This I made a non-emergency tel. Call to  Member's Spouse William and Member's Daughter Amanda subsequently using tel# on file. Verified HIPPA. Per Epic, may discuss billing and treatments to  aforementioned family members.     Introduced TCN program to member's Spouse William And Daughter Amanda. Provided education regarding post acute levels of care. Discussed HTH/SCP plan benefits (Meds to Beds, medical uber and [facility] transitional care). Pt's Spouse WILLIAM and Daughter Amanda verbalizes understanding.  Family expresses they are amenable to IRF and HH levels of care if it becomes recommended by MD or Therapy. Amanda notified  this TCN that patient had previously refused IRF and HH services after it was already bee approved-- I notified Primary SEGUNDO Marcos with this important information.      SLP, PT, OT eval pending.Choice proactively obtained for IRF,HH and given to Primary SEGUNDO Marcos. [facility] will continue to follow and collaborate with discharge planning team as additional post acute needs arise. Thank you.     Completed today:  SLP/PT/OT recs pending  CM obtained SNF choice  Choice obtained: IRF, HH  [facility] referral ( sent) 8/30/2022

## 2022-08-30 NOTE — HOSPITAL COURSE
This is a 62-year-old female with a past medical history significant for liver transplant, on chronic immunosuppressive medication on mycophenolate and tacrolimus, pulmonary hypertension, seizure disorder on keppra 750 mg po bid , chronic pain syndrome generalized anxiety,s/p bariatric surgery, presented to the ER on 8/27/2022 after she had a ground level fall.    She reports that she fell out of her bed and landed on the right hip.  CT head showed right holohemispheric acute to chronic subdural hematoma . Dr Okeefe of neurosurgery has evaluated the patient, stated no surgical intervention.  She was initially admitted to the trauma service to the ICU    She was also found to have acute angulated fracture of the right femoral neck, orthopedic surgery was consulted,, patient underwent right hip hemiarthroplasty on 8/20/2022 by Dr. Ferrer. Per orthopedic surgery, patient can bear weight as tolerated with posterior hip precaution.  PT/OT to evaluate the patient medical and postacute.  Patient is medically cleared to be discharged to skilled nursing facility.    Per request of hepatologist, GGT and Prograf level has been ordered. [employer] discussed with Dr. Okeefe who recommended refraining Lovenox until at least 7 days post-fall which will be 9/4/20222. She will need dvt ppx     Her hospital course was complicated with acute blood centimeter patient is a 2 years of PRBC transfusion hemoglobin has remained stable, today at 7.9  No active signs of bleeding noted.    Prior history of liver transplant events depression medication including tacrolimus  2 mg p.o. twice daily, mycophenolate 250 p.o. twice daily.  Regarding her pulmonary hypertension, she will continue tadalafil 20 mg p.o. daily, Lasix 20 mg p.o. daily, Ambrisentan 10 mg po qd.     For all the other chronic medical condition,she will resume her home meds

## 2022-08-30 NOTE — CARE PLAN
The patient is Stable - Low risk of patient condition declining or worsening    Shift Goals  Clinical Goals: Pain control, safety  Patient Goals: Comfort, rest  Family Goals: N/A    Progress made toward(s) clinical / shift goals:    Problem: Pain - Standard  Goal: Alleviation of pain or a reduction in pain to the patient’s comfort goal  Outcome: Progressing  Note:    Administering pain mediations as ordered (see MAR). Providing patient with cold packs, and repositioning as needed.         Problem: Knowledge Deficit - Standard  Goal: Patient and family/care givers will demonstrate understanding of plan of care, disease process/condition, diagnostic tests and medications  Outcome: Progressing  Note:   Discussed POC with patient; pain management, blood transfusions, working with PT/OT, wean down oxygen, increase use of IS, Q4 neuro checks, monitoring labs, and discharge planning once medically cleared. Patient verbalized understanding and is agreeable to POC. Encouraging pt to voice questions and concerns. Oriented pt to use of call light. Patient is capable of making needs known.        Patient is not progressing towards the following goals:

## 2022-08-30 NOTE — DISCHARGE PLANNING
Case Management Discharge Planning    Admission Date: 8/27/2022  GMLOS: 6  ALOS: 3    6-Clicks ADL Score: 15  6-Clicks Mobility Score: 7  PT and/or OT Eval ordered: Yes  Post-acute Referrals Ordered: Yes  Post-acute Choice Obtained: Yes  Has referral(s) been sent to post-acute provider:  Yes      Anticipated Discharge Dispo: Discharge Disposition: D/T to SNF with Medicare cert in anticipation of skilled care (03)    DME Needed: No    Action(s) Taken: DC Assessment Complete (See below)    LSW called pt's daughter to review the demographic information on the face sheet and complete a assessment. Pt's daughter stated pt and spouse reside together. Pt's daughter assists her parents with managing the finances and the pt's spouse supervises the pt and manages groceries, shopping, laundry and medication.     Pt's daughter stated the pt's cognition and orientation varies day to day and is not specific to a time of day. Pt's daughter stated some days shes alert and knows whats going on and is able to recognize ppl but, gets dates or details, such as where she lives mixed up a lot. Pt's daughter stated the pt seems to be getting to worse.     Pt's daughter stated [facility] Dr. Chopra has provided her with documentation stated the pt should not be making medical or financial decisions on her own and cannot drive. Pt's daughter will bring this documentation with her tomorrow.     Escalations Completed: None    Medically Clear: No    Next Steps: LSW will follow up with pt and family regarding nursing facility acceptance.     Barriers to Discharge: Medical clearance and Pending Placement    Is the patient up for discharge tomorrow: No    Care Transition Team Assessment    Information Source  Orientation Level: Oriented to place, Oriented to person, Oriented to situation  Information Given By: Relative  Informant's Name: Daughter Amanda Flowers  Who is responsible for making decisions for patient? : Spouse  Name(s)  of Primary Decision Maker: Otis Cuba    Readmission Evaluation  Is this a readmission?: No    Elopement Risk  Legal Hold: No  Ambulatory or Self Mobile in Wheelchair: No-Not an Elopement Risk  Elopement Risk: Not at Risk for Elopement    Interdisciplinary Discharge Planning  Lives with - Patient's Self Care Capacity: Spouse  Patient or legal guardian wants to designate a caregiver: No  Support Systems: Children, Spouse / Significant Other  Housing / Facility: 1 Story Apartment / Condo  Prior Services: Home-Independent  Durable Medical Equipment: Home Oxygen, Walker    Discharge Preparedness  What is your plan after discharge?: Skilled nursing facility  What are your discharge supports?: Child, Spouse  Prior Functional Level: Needs Assist with Activities of Daily Living, Needs Assist with Medication Management, Uses Walker, Bladder Incontinence, Bowel Incontinence  Difficulity with ADLs: Toileting, Walking, Bathing  Difficulity with IADLs: Cooking, Driving, Keeping track of finances, Laundry, Managing medication, Shopping, Using the telephone or computer    Functional Assesment  Prior Functional Level: Needs Assist with Activities of Daily Living, Needs Assist with Medication Management, Uses Walker, Bladder Incontinence, Bowel Incontinence    Finances  Financial Barriers to Discharge: No  Prescription Coverage: Yes    Vision / Hearing Impairment  Vision Impairment : No  Hearing Impairment : No    Advance Directive  Advance Directive?: DPOA for Health Care  Durable Power of  Name and Contact : Otis uCba [phone]    Domestic Abuse  Have you ever been the victim of abuse or violence?: No  Physical Abuse or Sexual Abuse: No  Verbal Abuse or Emotional Abuse: No  Possible Abuse/Neglect Reported to:: Not Applicable    Psychological Assessment  History of Substance Abuse: None  History of Psychiatric Problems: No  Non-compliant with Treatment: No  Newly Diagnosed Illness: Yes    Discharge Risks or  Barriers  Discharge risks or barriers?: Complex medical needs  Patient risk factors: Cognitive / sensory / physical deficit, Complex medical needs    Anticipated Discharge Information  Discharge Disposition: D/T to SNF with Medicare cert in anticipation of skilled care (03)

## 2022-08-31 NOTE — PROGRESS NOTES
Trauma / Surgical Daily Progress Note    Date of Service  8/31/2022    Chief Complaint  62 y.o. female admitted 8/27/2022 with injury    Interval Events  Hgb stable after transfusion yesterday  Vitals remain stable    Vital Signs  Temp:  [35.8 °C (96.5 °F)-37.1 °C (98.7 °F)] 35.8 °C (96.5 °F)  Pulse:  [84-96] 84  Resp:  [16-17] 17  BP: (102-137)/(55-75) 126/73  SpO2:  [90 %-97 %] 97 %    Physical Exam  Physical Exam  Vitals and nursing note reviewed.   Constitutional:       General: She is not in acute distress.     Appearance: She is not toxic-appearing.      Comments: Deconditioned.  Appears older than stated age.   HENT:      Head: Normocephalic.      Right Ear: External ear normal.      Left Ear: External ear normal.      Nose: Nose normal.      Mouth/Throat:      Mouth: Mucous membranes are moist.   Eyes:      General: No scleral icterus.     Extraocular Movements: Extraocular movements intact.      Pupils: Pupils are equal, round, and reactive to light.   Cardiovascular:      Rate and Rhythm: Tachycardia present.      Pulses: Normal pulses.      Heart sounds: Normal heart sounds.   Pulmonary:      Effort: Pulmonary effort is normal. No tachypnea, accessory muscle usage or respiratory distress.      Breath sounds: Examination of the right-lower field reveals decreased breath sounds. Examination of the left-lower field reveals decreased breath sounds. Decreased breath sounds present. No wheezing.   Chest:      Chest wall: No tenderness.   Abdominal:      General: There is no distension.      Palpations: Abdomen is soft.      Tenderness: There is no abdominal tenderness.   Musculoskeletal:         General: No swelling.      Cervical back: Normal range of motion.      Right upper leg: Swelling and tenderness present.   Skin:     General: Skin is warm and dry.      Capillary Refill: Capillary refill takes less than 2 seconds.      Coloration: Skin is not jaundiced.   Neurological:      Mental Status: She is  alert.      GCS: GCS eye subscore is 4. GCS verbal subscore is 4. GCS motor subscore is 6.   Psychiatric:      Comments: Unable to assess         Core Measures & Quality Metrics  Labs reviewed and Medications reviewed  Holbrook catheter: No Holbrook      DVT Prophylaxis: Contraindicated - High bleeding risk  DVT prophylaxis - mechanical: SCDs  Ulcer prophylaxis: Not indicated          Assessment/Plan  1) Post-Traumatic Acute Blood Loss Anemia:    Anemia multifactorial - mildly low on admit, acute blood loss from Ortho surgery, hemodilution from IV fluids & transfusions.  Transfused 1U PRBC.  Receiving iron supplementation.  Doubt ongoing hemorrhage at this time.  8/28 platelet mapping showed significant AA/ADP inhibition.  Received 2U platelets total since admit.  Will send a follow up TEG/PM.  Follow up Hgb at 1500 today.    No further recs from Trauma Surgery at this time.  Trauma Surgery signing off - contact with any questions or concerns.    I independently reviewed pertinent clinical lab tests from the last 48 hours and ordered additional follow up clinical lab tests.  I independently reviewed pertinent radiographic images and the radiologist's reports from the last 48 hours and ordered additional follow up radiographic studies.  The details of the available patient records in [facility] (including laboratory tests, culture data, medications, imaging, and other pertinent diagnostic tests) and that information was utilized as warranted in today's medical decision making for this patient.  I personally evaluated the patient condition at bedside.    Care interventions include:   Review of interval medical and surgical history, current medications and outpatient medication reconciliation, interval imaging studies and radiologist interpretation, and interval laboratory values.  Management of anemia.    Aggregated care time spent evaluating, reassessing, reviewing documentation, providing care, and managing this patient  exclusive of procedures: 35 minutes    Stephen Owusu MD, FACS

## 2022-08-31 NOTE — PROGRESS NOTES
Hospital Medicine Daily Progress Note  Date of Service  8/30/2022     Chief Complaint  Roxana Cuba is a 62 y.o. female admitted 8/27/2022 with        Chief Complaint   Patient presents with    Hip Pain       right    GLF       Rolled out of bed        Hospital Course  This is a 62-year-old female with a past medical history significant for liver transplant, on chronic immunosuppressive medication, pulmonary hypertension, seizure disorder, chronic pain syndrome generalized anxiety,s/p bariatric surgery, presented to the ER on/20 7/2022 after she had a ground-level fall.     She reports that she fell out of her bed and landed on the right hip.  CT head showed right holohemispheric acute to chronic subdural hematoma . Dr Okeefe of neurosurgery has evaluated the patient, stated no surgical intervention.  She was initially admitted to the trauma service to the ICU    She was also found to have acute angulated fracture of the right femoral neck, orthopedic surgery was consulted,, patient underwent right hip hemiarthroplasty on 8/20/2022 by Dr. Ferrer     Per request of hepatologist, GGT and Prograf level has been ordered.  PT/OT has evaluate the patient, recommended postacute, physiatry has consulted.    [facility] discussed with Dr. Okeefe who recommended refraining Lovenox until at least 7 days post-fall     Interval events:  -- No acute events overnight, vital signs NSR, heart rate 91-1 04, blood pressure 102/56, saturating 95% on 2 L of oxygen.  Patient continued to complain of headache.  Stated that she did not want to stay in the hospital.  --In the last 24 hours, she has received 50 mg of oxycodone, 30 mg of morphine sulfate and 0.5 mg of Dilaudid.  We will try muscle relaxant  --She is noted to be anemic, will provide blood transfusion  PT/OT has evaluate the patient medical and postacute. She has been declined by  Rehab , will place skilled nursing referral        8/31:  -- No acute events overnight,  patient noted to be tachycardic with a heart rate of 104, blood pressure 136/87, saturating 94% entry, answering questions appropriately.  See continue to complain of the pain in her hip.  Does have incisional VAC in place  --Sodium at 131, monitor  --2 units  she has received of PRBC transfusion on 8/30/2022 hemoglobin will monitor.  She has also been receiving IV iron  -- No sign of active bleeding noted    I have discussed this patient's plan of care and discharge plan at IDT rounds today with Case Management, Nursing, Nursing leadership, and other members of the IDT team.     Consultants/Specialty  trauma surgery  Ortho      Code Status  Full Code     Disposition  Patient is not medically cleared for discharge.   Anticipate discharge to to skilled nursing facility.  I have placed the appropriate orders for post-discharge needs.     Review of Systems  Review of Systems   Constitutional:  Positive for malaise/fatigue. Negative for fever.   HENT:  Negative for congestion and sore throat.    Eyes:  Negative for blurred vision and double vision.   Respiratory:  Negative for cough, hemoptysis and sputum production.    Cardiovascular:  Negative for chest pain, palpitations and orthopnea.   Gastrointestinal:  Negative for abdominal pain, heartburn, nausea and vomiting.   Genitourinary:  Negative for dysuria.   Musculoskeletal:  Positive for myalgias.   Neurological:  Positive for headaches. Negative for dizziness and focal weakness.   Psychiatric/Behavioral:  Negative for depression. The patient is nervous/anxious.    All other systems reviewed and are negative.      Physical Exam  Temp:  [35.8 °C (96.5 °F)-37.3 °C (99.2 °F)] 37.1 °C (98.7 °F)  Pulse:  [] 93  Resp:  [] 16  BP: (102-141)/(55-76) 102/56  SpO2:  [92 %-99 %] 95 %     Physical Exam  Vitals and nursing note reviewed.   Constitutional:       Appearance: Normal appearance.   HENT:      Head: Normocephalic and atraumatic.   Eyes:      Extraocular  Movements: Extraocular movements intact.      Pupils: Pupils are equal, round, and reactive to light.   Cardiovascular:      Rate and Rhythm: Normal rate.      Pulses: Normal pulses.   Pulmonary:      Effort: No respiratory distress.      Breath sounds: Normal breath sounds.   Abdominal:      General: Bowel sounds are normal. There is no distension.      Palpations: Abdomen is soft.      Tenderness: There is no abdominal tenderness.   Musculoskeletal:      Cervical back: Neck supple.   Skin:     General: Skin is warm.   Neurological:      Mental Status: She is alert and oriented to person, place, and time.      Cranial Nerves: No cranial nerve deficit.   Psychiatric:         Mood and Affect: Mood normal.         Fluids  No intake or output data in the 24 hours ending 08/30/22 1410     Laboratory        Recent Labs     08/28/22  0420 08/29/22  0445 08/30/22  0826   WBC 5.0 6.6 6.0   RBC 3.10* 2.46* 1.96*   HEMOGLOBIN 9.6* 7.7* 6.0*   HEMATOCRIT 28.5* 23.3* 18.2*   MCV 91.9 94.7 92.9   MCH 31.0 31.3 30.6   MCHC 33.7 33.0* 33.0*   RDW 46.5 48.2 49.1   PLATELETCT 114* 164 139*   MPV 9.9 9.6 8.9*            Recent Labs     08/28/22  0420 08/29/22  0445 08/30/22  0827   SODIUM 139 135 132*   POTASSIUM 3.7 4.5 4.0   CHLORIDE 104 100 98   CO2 27 24 26   GLUCOSE 142* 167* 170*   BUN 21 17 15   CREATININE 0.66 0.60 0.48*   CALCIUM 8.0* 7.3* 7.2*          Recent Labs     08/27/22  1827   APTT 28.7   INR 1.05              Imaging  SO-OTUKIGV-0 VIEW   Final Result      No evidence of bowel obstruction.                  IR-US GUIDED PIV   Final Result    Ultrasound-guided PERIPHERAL IV INSERTION performed by    qualified nursing staff as above.      US-TRAUMA VEIN SCREEN LOWER BILAT EXTREMITY   Final Result      DX-PELVIS-1 OR 2 VIEWS   Final Result         1. Status post right hip arthroplasty without evidence of early hardware complication.      DX-ELBOW-LIMITED 2- RIGHT   Final Result      1.  No acute fracture seen on these 2  views of the right elbow.   2.  There is evidence of an old healed fracture of the proximal ulna..      CT-HEAD W/O   Final Result      No significant interval change.         CT-HEAD W/O   Final Result      1.  Right holohemispheric mixed density extra-axial hemorrhage with subacute to chronic components in a parasagittal location and overlying the high right parietal region measuring up to 8 mm in depth. There are also acute components more inferiorly with    a biconvex focal area seen overlying the right posterior parietal region measuring 8 mm in depth. This likely represents subdural hemorrhage however the biconvex focus of hemorrhage could represent an epidural component.      2.  No significant mass effect or midline shift.      3.  Atrophy.      4.  Surgical change involving the left calvarium.      5.  This was discussed with ANH CONLEY at 7:06 PM on 8/27/2022.         DX-KNEE 2- RIGHT   Final Result         1. No acute osseous abnormality but evaluation is limited due to osseous demineralization.      DX-HIP-UNILATERAL-WITH PELVIS-1 VIEW RIGHT   Final Result         Acute angulated fracture of the right femoral neck      DX-CHEST-PORTABLE (1 VIEW)   Final Result      No evidence of acute cardiopulmonary process.             Assessment/Plan  * Subdural hematoma, post-traumatic (HCC)- (present on admission)  Assessment & Plan  She has a previous history of craniotomy 8/21  Dr. Okeefe neurosurgery was consulted and recommends medical management.   Refrain from Lovenox for 7 days after the fall and okay to start if she requires DVT prophylaxis.   Refrain from NSAIDs.     Seizure disorder (HCC)- (present on admission)  Assessment & Plan  She is on Keppra 750 mg BID and Vimpat 100 mg BID outpatient both of which have been continued.     Encephalopathy acute- (present on admission)  Assessment & Plan  This may be a combination of subdural hematoma, ICU delirium, and post-anesthesia   Resolved on 8/30   --  Able to answer alert and oriented questions    Acute blood loss anemia- (present on admission)  Assessment & Plan  Baseline Hb is 14.2 and now Hb is at 6   -- s/p 2  unit of prbc transfusion    -- IV iron  Monitor   Today hemoglobin at 7.6    Frequent falls- (present on admission)  Assessment & Plan  Needs PT and OT    Trauma- (present on admission)  Assessment & Plan  She was admitted to the trauma service  She may be an excellent candidate for inpatient rehab prior to discharge home as she had PT/OT needs and is very medically complex with active, acute problems (liver transplant, subdural hematoma, acute encephalopathy, pain control).    Displaced fracture of right femoral neck (HCC)- (present on admission)  Assessment & Plan  Status post surgery by Dr. Ferrer on 8/28  Weight-bearing as tolerated  Pain control  PT and OT recs post-acute; snf referra in place  SCDs for DVT prophylaxis until 7 days after admission at which time she may have Lovenox 40 mg daily if she is not ambulatory (per Dr. Okeefe).    Primary pulmonary hypertension (HCC)- (present on admission)  Assessment & Plan  Followed by Dr. Phan cardiology  Continue Ambrisentan and Cialis    Chronic nausea- (present on admission)  Assessment & Plan  Zofran prn    S/P bariatric surgery- (present on admission)  Assessment & Plan  Hx of    GERD (gastroesophageal reflux disease)- (present on admission)  Assessment & Plan  Continue PPI      Chronic pain syndrome- (present on admission)  Assessment & Plan  She is on outpatient MS Contin 15 mg BID and oxycodone 10 mg BID. These have been continued.     History of liver transplant (HCC)- (present on admission)  Assessment & Plan  Continue outpatient Cellcept and Prograf  Dr. Tello her hepatologist has requested a Prograf level and GGT which have been ordered.    DUC (generalized anxiety disorder)- (present on admission)  Assessment & Plan  Continue Zoloft 150 mg daily  She was on Klonopin 0.5 mg TID at  home and will continue     Hypothyroidism- (present on admission)  Assessment & Plan  Continue synthroid 137 mcg daily       VTE prophylaxis: SCDs/TEDs    I have performed a physical exam and reviewed and updated ROS and Plan today (8/31/2022). In review of yesterday's note (8/30/2022), there are no changes except as documented above.

## 2022-08-31 NOTE — CARE PLAN
The patient is Watcher - Medium risk of patient condition declining or worsening    Shift Goals  Clinical Goals: Pain Management; Stable Hgb/Hct; Safety  Patient Goals: Rest; Pain Management  Family Goals: N/A    Progress made toward(s) clinical / shift goals:    Problem: Pain - Standard  Goal: Alleviation of pain or a reduction in pain to the patient’s comfort goal  Outcome: Progressing   Available pain medications reviewed with patient. Pain managed by PRN and scheduled medications. Encouraged to call if pain is no longer managed.     Problem: Knowledge Deficit - Standard  Goal: Patient and family/care givers will demonstrate understanding of plan of care, disease process/condition, diagnostic tests and medications  Outcome: Progressing   Plan of care discussed with patient, verbalizes understanding. Encouraged to voice feelings or concerns.     Problem: Fall Risk  Goal: Patient will remain free from falls  Outcome: Progressing   Fall precautions in place. Patient rounded on hourly. Clutter-free environment maintained. Bed alarm on, bed locked and in lowest position. Call light in reach.

## 2022-08-31 NOTE — PROGRESS NOTES
4 Eyes Skin Assessment Completed by PENNY Rios and PENNY López.    Head Bruising  Ears Redness and Blanching  Nose WDL  Mouth WDL  Neck WDL  Breast/Chest WDL  Shoulder Blades WDL  Spine-old scars  (R) Arm/Elbow/Hand Redness, Blanching, Bruising, Abrasion, and Scab  (L) Arm/Elbow/Hand Bruising  Abdomen WDL  Groin WDL  Scrotum/Coccyx/Buttocks Blanching and Discoloration  (R) Leg -Scab, Bruising, Abrasion, Edema, and Incision-R hip with Prevena in place, Discoloration on Shin  (L) Leg Bruising  (R) Heel/Foot/Toe WDL  (L) Heel/Foot/Toe WDL          Devices In Places Pulse Ox, SCD's, and Nasal Cannula      Interventions In Place Gray Ear Foams, Pillows, Q2 Turns, Heels Loaded W/Pillows, and Pressure Redistribution Mattress    Possible Skin Injury No    Pictures Uploaded Into Epic N/A  Wound Consult Placed N/A  RN Wound Prevention Protocol Ordered No

## 2022-08-31 NOTE — PROGRESS NOTES
Orthopaedic Progress Note    Interval changes:  Patient doing well   Patient with mod confusion at times but other periods of being lucid  Right hip prevena vac dressing in place without leak    ROS - Patient denies any new issues.  Pain not well controlled due to hypotension preventing administration of pain meds    /87   Pulse (!) 104   Temp 36.3 °C (97.4 °F) (Temporal)   Resp 18   Wt 53 kg (116 lb 13.5 oz)   SpO2 94%       Patient seen and examined  No acute distress  Breathing non labored  RRR  Right hip surgical vac dressing is clean, dry, intact, and without leak. Patient clearly fires tibialis anterior, EHL, and gastrocnemius/soleus. Sensation is intact to light touch throughout superficial peroneal, deep peroneal, tibial, saphenous, and sural nerve distributions. Strong and palpable 2+ dorsalis pedis and posterior tibial pulses with capillary refill less than 2 seconds. No lower leg tenderness or discomfort.     Recent Labs     08/29/22  0445 08/30/22  0826 08/30/22  1631 08/31/22  0645   WBC 6.6 6.0  --  5.8   RBC 2.46* 1.96*  --  2.49*   HEMOGLOBIN 7.7* 6.0* 6.6* 7.6*   HEMATOCRIT 23.3* 18.2* 19.4* 22.9*   MCV 94.7 92.9  --  92.0   MCH 31.3 30.6  --  30.5   MCHC 33.0* 33.0*  --  33.2*   RDW 48.2 49.1  --  53.8*   PLATELETCT 164 139*  --  137*   MPV 9.6 8.9*  --  8.3*       Active Hospital Problems    Diagnosis     Displaced fracture of right femoral neck (HCC) [S72.001A]      Priority: High    Subdural hematoma, post-traumatic (HCC) [S06.5X9A]      Priority: High    Acute blood loss anemia [D62]      Priority: Medium    Contraindication to deep vein thrombosis (DVT) prophylaxis [Z53.09]      Priority: Medium    Frequent falls [R29.6]      Priority: Medium    Platelet dysfunction (HCC) [D69.1]      Priority: Medium    Chronic nausea [R11.0]      Priority: Medium    GERD (gastroesophageal reflux disease) [K21.9]      Priority: Medium    Chronic pain syndrome [G89.4]      Priority: Medium     Other hyperlipidemia [E78.49]      Priority: Medium    History of liver transplant (HCC) [Z94.4]      Priority: Medium    Primary pulmonary hypertension (HCC) [I27.0]      Priority: Medium    Right elbow pain [M25.521]      Priority: Low    Discharge planning issues [Z02.9]      Priority: Low    Trauma [T14.90XA]      Priority: Low    DUC (generalized anxiety disorder) [F41.1]      Priority: Low     IMO load March 2020      Hypothyroidism [E03.9]      Priority: Low    Encephalopathy acute [G93.40]     Seizure disorder (HCC) [G40.909]     S/P bariatric surgery [Z98.84]      Overview:   Added automatically from request for surgery [id number]         Assessment/Plan:  Patient doing well   Patient with mod confusion at times but other periods of being lucid  Right hip prevena vac dressing in place without leak  POD#3 S/P Right hip hemiarthroplasty   Wt bearing status - WBAT RLE with post hip precautions  Wound care/Drains - Dressings to be changed every other day by nursing  Future Procedures - none planned   Sutures/Staples out- 14 days post operatively  PT/OT-initiated  Antibiotics: Perioperative completed  DVT Prophylaxis- TEDS/SCDs/Foot pumps  Holbrook-none  Case Coordination for Discharge Planning - Disposition rehab

## 2022-08-31 NOTE — DISCHARGE PLANNING
Agency/Facility Name: [facility]  Spoke To: Aravind   Outcome: Pt can be accepted to facility as long as family is able to provide immune suppressant medications during her stay. Informed LSW who will ask family.     @[phone]  Agency/Facility Name: [facility]  Spoke To: Aravind  Outcome: Informed her that the family will be able to provide medications for pt will admitted in SNF. Aravind to run everything by DOA and call DPA back.

## 2022-08-31 NOTE — DISCHARGE PLANNING
Case Management Discharge Planning    Admission Date: 8/27/2022  GMLOS: 6  ALOS: 4    6-Clicks ADL Score: 15  6-Clicks Mobility Score: 10  PT and/or OT Eval ordered: Yes  Post-acute Referrals Ordered: Yes  Post-acute Choice Obtained: Yes  Has referral(s) been sent to post-acute provider:  Yes      Anticipated Discharge Dispo: Discharge Disposition: D/T to SNF with Medicare cert in anticipation of skilled care (03)    DME Needed: No    Action(s) Taken: OTHER    LSW met with pt's daughter. Pt's daughter provided this LSW with a copy of a Confidential Neuropsychological Evaluation dated 05/04/2022 and 5/12/2022 by Dr. Oscar Jimenez. This document was scanned to the DPA and will be filed under outside medical records.     LSW would like to note that this document states that it is advised that Ms. Cuba no longer engage  in this type of decision making (Complex decision making (e.g. legal, financial, medical)) given her cognitive deficits. In this regard, treatment planning decisions should be deferred to her  and daughter, who have durable POA.     Escalations Completed: None    Medically Clear: No    Next Steps: LSW will     Barriers to Discharge: Medical clearance and Pending Placement    Is the patient up for discharge tomorrow: Yes    Is transport arranged for discharge disposition: No

## 2022-08-31 NOTE — PROGRESS NOTES
HOSPITALIST CROSS COVER    Hgb 6.6 from 6.0 after 1 unit of PRBC transfusion. She also received IV iron. Patient is asymptomatic.    VS: temp 36.3, HR 90, RR 16, /62, O2 st 93% on RA    A/P  #Acute Anemia  - transfuse 1 unit PRBC  - monitor H&H

## 2022-08-31 NOTE — DISCHARGE PLANNING
"HTH/SCP TCN chart review completed. Collaborated with PASTOR Marcos and ALVIN. Noted at this time per chart and discussion with DPA that \"family will be able to provide medications\" for pt to admit to [facility]. Noted has acceptance from [facility] at time of writing this note. TCN will continue to follow and collaborate with discharge planning team as additional post acute needs arise. Thank you.    Previously completed:  - Orders received for: PT and OT consult -> rec placement  - Choice forms: IRF, SNF, HH   - GSC sent   "

## 2022-08-31 NOTE — CARE PLAN
The patient is Watcher - Medium risk of patient condition declining or worsening    Shift Goals  Clinical Goals: Pain Management; Stable Hgb/Hct; Safety  Patient Goals: Rest; Pain Management  Family Goals: N/A    Progress made toward(s) clinical / shift goals:    Problem: Pain - Standard  Goal: Alleviation of pain or a reduction in pain to the patient’s comfort goal  Outcome: Progressing  Note: Medicated per MAR with PRN oxycodone.      Problem: Venous Thromboembolism (VTE) Prevention  Goal: The patient will remain free from venous thromboembolism (VTE)  Outcome: Progressing  Note: SCD's on and in use.        Patient is not progressing towards the following goals: NA

## 2022-08-31 NOTE — PROGRESS NOTES
Orthopaedic Progress Note    Interval changes:  Patient doing well   Transferred out of ICU to ortho floor  Anemic requiring transfusion-medicine team managing  Right hip prevena vac dressing in place without leak    ROS - Patient denies any new issues.  Pain not well controlled due to hypotension preventing administration of pain meds    /73   Pulse 84   Temp 35.8 °C (96.5 °F) (Temporal)   Resp 17   Wt 53 kg (116 lb 13.5 oz)   SpO2 97%       Patient seen and examined  No acute distress  Breathing non labored  RRR  Right hip surgical vac dressing is clean, dry, intact, and without leak. Patient clearly fires tibialis anterior, EHL, and gastrocnemius/soleus. Sensation is intact to light touch throughout superficial peroneal, deep peroneal, tibial, saphenous, and sural nerve distributions. Strong and palpable 2+ dorsalis pedis and posterior tibial pulses with capillary refill less than 2 seconds. No lower leg tenderness or discomfort.     Recent Labs     08/29/22  0445 08/30/22  0826 08/30/22  1631 08/31/22  0645   WBC 6.6 6.0  --  5.8   RBC 2.46* 1.96*  --  2.49*   HEMOGLOBIN 7.7* 6.0* 6.6* 7.6*   HEMATOCRIT 23.3* 18.2* 19.4* 22.9*   MCV 94.7 92.9  --  92.0   MCH 31.3 30.6  --  30.5   MCHC 33.0* 33.0*  --  33.2*   RDW 48.2 49.1  --  53.8*   PLATELETCT 164 139*  --  137*   MPV 9.6 8.9*  --  8.3*       Active Hospital Problems    Diagnosis     Displaced fracture of right femoral neck (HCC) [S72.001A]      Priority: High    Subdural hematoma, post-traumatic (HCC) [S06.5X9A]      Priority: High    Acute blood loss anemia [D62]      Priority: Medium    Contraindication to deep vein thrombosis (DVT) prophylaxis [Z53.09]      Priority: Medium    Frequent falls [R29.6]      Priority: Medium    Platelet dysfunction (HCC) [D69.1]      Priority: Medium    Chronic nausea [R11.0]      Priority: Medium    GERD (gastroesophageal reflux disease) [K21.9]      Priority: Medium    Chronic pain syndrome [G89.4]       Priority: Medium    Other hyperlipidemia [E78.49]      Priority: Medium    History of liver transplant (HCC) [Z94.4]      Priority: Medium    Primary pulmonary hypertension (HCC) [I27.0]      Priority: Medium    Right elbow pain [M25.521]      Priority: Low    Discharge planning issues [Z02.9]      Priority: Low    Trauma [T14.90XA]      Priority: Low    DUC (generalized anxiety disorder) [F41.1]      Priority: Low     IMO load March 2020      Hypothyroidism [E03.9]      Priority: Low    Encephalopathy acute [G93.40]     Seizure disorder (HCC) [G40.909]     S/P bariatric surgery [Z98.84]      Overview:   Added automatically from request for surgery [id number]         Assessment/Plan:  Patient doing well   Transferred out of ICU to ortho floor  Anemic requiring transfusion-medicine team managing  Right hip prevena vac dressing in place without leak  POD#2 S/P Right hip hemiarthroplasty   Wt bearing status - WBAT RLE with post hip precautions  Wound care/Drains - Dressings to be changed every other day by nursing  Future Procedures - none planned   Sutures/Staples out- 14 days post operatively  PT/OT-initiated  Antibiotics: Perioperative completed  DVT Prophylaxis- TEDS/SCDs/Foot pumps  [place]-none  Case Coordination for Discharge Planning - Disposition rehab

## 2022-08-31 NOTE — PROGRESS NOTES
2050: Per Day RN, NOC RN to contact Hospitalist regarding patient's 6.6 Hgb when Iron is finished infusing.    2052: Hospitalist updated. 1 Unit of RBCS ordered.    2117: Patient informed. Transfusion started. 2 RN verification utilized.    2132: Patient tolerating transfusion. Vital signs stable. RN will continue to monitor.

## 2022-09-01 PROBLEM — D69.1 PLATELET DYSFUNCTION (HCC): Status: RESOLVED | Noted: 2022-01-01 | Resolved: 2022-01-01

## 2022-09-01 NOTE — DIETARY
Nutrition Services Brief Update:    Day 5 of admit.  Roxana Cuba is a 62 y.o. female with admitting DX of Trauma [T14.90XA].    Current Diet: Consistent CHO    Problem: Nutritional:  Goal: Achieve adequate nutritional intake  Description: Patient will consume 50% of meals consistently.  Outcome: PROGRESSING. PO intake variable since initial RD visit (8/30), but showing some improvement per ADLs (% x2 meals yesterday).    Pending transfer to [facility].  RD will continue to monitor if pt remains admitted.

## 2022-09-01 NOTE — PROGRESS NOTES
Hospital Medicine Daily Progress Note  Date of Service  8/30/2022     Chief Complaint  Roxana Cuba is a 62 y.o. female admitted 8/27/2022 with        Chief Complaint   Patient presents with    Hip Pain       right    GLF       Rolled out of bed        Hospital Course  This is a 62-year-old female with a past medical history significant for liver transplant, on chronic immunosuppressive medication, pulmonary hypertension, seizure disorder, chronic pain syndrome generalized anxiety,s/p bariatric surgery, presented to the ER on/20 7/2022 after she had a ground-level fall.     She reports that she fell out of her bed and landed on the right hip.  CT head showed right holohemispheric acute to chronic subdural hematoma . Dr Okeefe of neurosurgery has evaluated the patient, stated no surgical intervention.  She was initially admitted to the trauma service to the ICU    She was also found to have acute angulated fracture of the right femoral neck, orthopedic surgery was consulted,, patient underwent right hip hemiarthroplasty on 8/20/2022 by Dr. Ferrer     Per request of hepatologist, GGT and Prograf level has been ordered.  PT/OT has evaluate the patient, recommended postacute, physiatry has consulted.    [employer] discussed with Dr. Okeefe who recommended refraining Lovenox until at least 7 days post-fall     Interval events:  -- No acute events overnight, vital signs NSR, heart rate 91-1 04, blood pressure 102/56, saturating 95% on 2 L of oxygen.  Patient continued to complain of headache.  Stated that she did not want to stay in the hospital.  --In the last 24 hours, she has received 50 mg of oxycodone, 30 mg of morphine sulfate and 0.5 mg of Dilaudid.  We will try muscle relaxant  --She is noted to be anemic, will provide blood transfusion  PT/OT has evaluate the patient medical and postacute. She has been declined by  Rehab , will place skilled nursing referral        8/31:  -- No acute events overnight,  patient noted to be tachycardic with a heart rate of 104, blood pressure 136/87, saturating 94% entry, answering questions appropriately.  See continue to complain of the pain in her hip.  Does have incisional VAC in place  --Sodium at 131, monitor  --2 units  she has received of PRBC transfusion on 8/30/2022 hemoglobin will monitor.  She has also been receiving IV iron  -- No sign of active bleeding noted    9/1:  -- No acute events overnight, vital sign has been stable, heart rate 80-90, blood pressure 141/71 sats are 96%.  Oxygen.  Patient is alert and oriented monitor was appropriately.  PT/OT evaluate the patient, recommended postacute.  -- Hemoglobin at 8.1; and platelet at 152  -- Phos at 1.9; will replete an monitor   Patient is medically cleared to be discharged to skilled nursing facility    I have discussed this patient's plan of care and discharge plan at IDT rounds today with Case Management, Nursing, Nursing leadership, and other members of the IDT team.     Consultants/Specialty  trauma surgery  Ortho      Code Status  Full Code     Disposition  Patient is medically cleared for discharge.   Anticipate discharge to to skilled nursing facility.  I have placed the appropriate orders for post-discharge needs.     Review of Systems  Review of Systems   Constitutional:  Positive for malaise/fatigue. Negative for fever.   HENT:  Negative for congestion and sore throat.    Eyes:  Negative for blurred vision and double vision.   Respiratory:  Negative for cough, hemoptysis and sputum production.    Cardiovascular:  Negative for chest pain, palpitations and orthopnea.   Gastrointestinal:  Negative for abdominal pain, heartburn, nausea and vomiting.   Genitourinary:  Negative for dysuria.   Musculoskeletal:  Positive for myalgias.   Neurological:  Positive for headaches. Negative for dizziness and focal weakness.   Psychiatric/Behavioral:  Negative for depression. The patient is nervous/anxious.    All other  systems reviewed and are negative.      Physical Exam  Temp:  [35.8 °C (96.5 °F)-37.3 °C (99.2 °F)] 37.1 °C (98.7 °F)  Pulse:  [] 93  Resp:  [] 16  BP: (102-141)/(55-76) 102/56  SpO2:  [92 %-99 %] 95 %     Physical Exam  Vitals and nursing note reviewed.   Constitutional:       Appearance: Normal appearance.   HENT:      Head: Normocephalic and atraumatic.   Eyes:      Extraocular Movements: Extraocular movements intact.      Pupils: Pupils are equal, round, and reactive to light.   Cardiovascular:      Rate and Rhythm: Normal rate.      Pulses: Normal pulses.   Pulmonary:      Effort: No respiratory distress.      Breath sounds: Normal breath sounds.   Abdominal:      General: Bowel sounds are normal. There is no distension.      Palpations: Abdomen is soft.      Tenderness: There is no abdominal tenderness.   Musculoskeletal:      Cervical back: Neck supple.   Skin:     General: Skin is warm.   Neurological:      Mental Status: She is alert and oriented to person, place, and time.      Cranial Nerves: No cranial nerve deficit.   Psychiatric:         Mood and Affect: Mood normal.         Fluids  No intake or output data in the 24 hours ending 08/30/22 1410     Laboratory        Recent Labs     08/28/22  0420 08/29/22  0445 08/30/22  0826   WBC 5.0 6.6 6.0   RBC 3.10* 2.46* 1.96*   HEMOGLOBIN 9.6* 7.7* 6.0*   HEMATOCRIT 28.5* 23.3* 18.2*   MCV 91.9 94.7 92.9   MCH 31.0 31.3 30.6   MCHC 33.7 33.0* 33.0*   RDW 46.5 48.2 49.1   PLATELETCT 114* 164 139*   MPV 9.9 9.6 8.9*            Recent Labs     08/28/22  0420 08/29/22  0445 08/30/22  0827   SODIUM 139 135 132*   POTASSIUM 3.7 4.5 4.0   CHLORIDE 104 100 98   CO2 27 24 26   GLUCOSE 142* 167* 170*   BUN 21 17 15   CREATININE 0.66 0.60 0.48*   CALCIUM 8.0* 7.3* 7.2*          Recent Labs     08/27/22  1827   APTT 28.7   INR 1.05              Imaging  AU-PIRVBAQ-0 VIEW   Final Result      No evidence of bowel obstruction.                  IR-US GUIDED PIV    Final Result    Ultrasound-guided PERIPHERAL IV INSERTION performed by    qualified nursing staff as above.      US-TRAUMA VEIN SCREEN LOWER BILAT EXTREMITY   Final Result      DX-PELVIS-1 OR 2 VIEWS   Final Result         1. Status post right hip arthroplasty without evidence of early hardware complication.      DX-ELBOW-LIMITED 2- RIGHT   Final Result      1.  No acute fracture seen on these 2 views of the right elbow.   2.  There is evidence of an old healed fracture of the proximal ulna..      CT-HEAD W/O   Final Result      No significant interval change.         CT-HEAD W/O   Final Result      1.  Right holohemispheric mixed density extra-axial hemorrhage with subacute to chronic components in a parasagittal location and overlying the high right parietal region measuring up to 8 mm in depth. There are also acute components more inferiorly with    a biconvex focal area seen overlying the right posterior parietal region measuring 8 mm in depth. This likely represents subdural hemorrhage however the biconvex focus of hemorrhage could represent an epidural component.      2.  No significant mass effect or midline shift.      3.  Atrophy.      4.  Surgical change involving the left calvarium.      5.  This was discussed with ANH CONLEY at 7:06 PM on 8/27/2022.         DX-KNEE 2- RIGHT   Final Result         1. No acute osseous abnormality but evaluation is limited due to osseous demineralization.      DX-HIP-UNILATERAL-WITH PELVIS-1 VIEW RIGHT   Final Result         Acute angulated fracture of the right femoral neck      DX-CHEST-PORTABLE (1 VIEW)   Final Result      No evidence of acute cardiopulmonary process.             Assessment/Plan  * Subdural hematoma, post-traumatic (HCC)- (present on admission)  Assessment & Plan  She has a previous history of craniotomy 8/21  Dr. Okeefe neurosurgery was consulted and recommends medical management.   Refrain from Lovenox for 7 days after the fall and okay to  start if she requires DVT prophylaxis.   Refrain from NSAIDs.     Seizure disorder (HCC)- (present on admission)  Assessment & Plan  She is on Keppra 750 mg BID and Vimpat 100 mg BID outpatient both of which have been continued.   Seizure precaution     Encephalopathy acute- (present on admission)  Assessment & Plan  This may be a combination of subdural hematoma, ICU delirium, and post-anesthesia   Resolved on 8/30   -- Able to answer alert and oriented questions    Acute blood loss anemia- (present on admission)  Assessment & Plan  Baseline Hb is 14.2 and now Hb is at 6   -- s/p 2  unit of prbc transfusion    -- IV iron  Monitor   Today hemoglobin at 8.1    Frequent falls- (present on admission)  Assessment & Plan  PT/OT recommended postacute, SNF referral has been placed    Contraindication to deep vein thrombosis (DVT) prophylaxis- (present on admission)  Assessment & Plan  Per NS, can start 7 days after the fall    Trauma- (present on admission)  Assessment & Plan  She was admitted to the trauma service  She may be an excellent candidate for inpatient rehab prior to discharge home as she had PT/OT needs and is very medically complex with active, acute problems (liver transplant, subdural hematoma, acute encephalopathy, pain control).    Displaced fracture of right femoral neck (HCC)- (present on admission)  Assessment & Plan  Status post surgery by Dr. Ferrer on 8/28  Weight-bearing as tolerated  Pain control  PT and OT recs post-acute; snf referra in place  SCDs for DVT prophylaxis until 7 days after admission at which time she may have Lovenox 40 mg daily if she is not ambulatory (per Dr. Okeefe).    Primary pulmonary hypertension (HCC)- (present on admission)  Assessment & Plan  Followed by Dr. Phan cardiology  Continue Ambrisentan and Cialis, lasix prn     Chronic nausea- (present on admission)  Assessment & Plan  Zofran prn    S/P bariatric surgery- (present on admission)  Assessment & Plan  Hx  of    GERD (gastroesophageal reflux disease)- (present on admission)  Assessment & Plan  Continue PPI      Chronic pain syndrome- (present on admission)  Assessment & Plan  She is on outpatient MS Contin 15 mg BID and oxycodone 10 mg BID. These have been continued.   Along with prn anti-htn meds    Pulmonary hypertension (HCC)  Assessment & Plan  Prn  Lasix     History of liver transplant (HCC)- (present on admission)  Assessment & Plan  Continue outpatient Cellcept and Prograf  Dr. Tello her hepatologist has requested a Prograf level and GGT which have been ordered.    DUC (generalized anxiety disorder)- (present on admission)  Assessment & Plan  Continue Zoloft 150 mg daily  She was on Klonopin 0.5 mg TID prn at home and will continue     Hypothyroidism- (present on admission)  Assessment & Plan  Continue synthroid 137 mcg daily       VTE prophylaxis: SCDs/TEDs    I have performed a physical exam and reviewed and updated ROS and Plan today (9/1/2022). In review of yesterday's note (8/31/2022), there are no changes except as documented above.

## 2022-09-01 NOTE — DISCHARGE PLANNING
"HTH/SCP TCN chart review completed. Collaborated with SW/SEGUNDO and DPA. Noted as prior, per chart and discussion with DPA that \"family will be able to provide medications\" for pt to admit to [facility]. Noted has acceptance from [facility] at time of writing this note. Note that pt is medically cleared at this time and is awaiting bed availability. Would also note and appreciate DPA reach out to pending/denying SNFs for updated POC as family IS able to provide medications which was barrier to placement initially. Pending SNF acceptance and call backs per review. TCN will continue to follow and collaborate with discharge planning team as additional post acute needs arise. Thank you.     Previously completed:  - Orders received for: PT and OT consult -> rec placement  - Choice forms: IRF, SNF, HH   - GSC sent   "

## 2022-09-01 NOTE — THERAPY
Physical Therapy   Daily Treatment     Patient Name: Roxana Cuba  Age:  62 y.o., Sex:  female  Medical Record #: [id number]  Today's Date: 9/1/2022     Precautions  Precautions: Fall Risk;Weight Bearing As Tolerated Right Lower Extremity;Posterior Hip Precautions  Comments: SDH    Assessment    Pt pleasantly confused and emotionally labile throughout tx session. Extensive time and effort with education to participate in therapy session. Reviewed posterior hip precautions however unclear if retention at this time. She required maxA for trunk and BLE to reach EOB and for seated scooting. She required less assist to return back to bed at this time. She completed 3 STS with modA for lift off and HHA. Started with lateral weight shifting and modA but unable to take side steps at this time. She required modA to maintain standing due to posterior lean, pt refusing chair transfer. Will continue to follow while in house as she is limited due to general weakness and activity tolerance.     Plan    Continue current treatment plan.    DC Equipment Recommendations: Unable to determine at this time  Discharge Recommendations: Recommend post-acute placement for additional physical therapy services prior to discharge home         09/01/22 1057   Other Treatments   Other Treatments Provided discussed importance of mobility, reviewed posterior hip precautions   Balance   Sitting Balance (Static) Fair -   Sitting Balance (Dynamic) Poor +   Standing Balance (Static) Poor -   Standing Balance (Dynamic) Trace +   Weight Shift Sitting Poor   Weight Shift Standing Poor   Comments able to sit EOB with no assist. STanding with maxA pt with posterior lean   Gait Analysis   Gait Level Of Assist Unable to Participate   Comments attempted lateral weight shifting, unable to side step.   Bed Mobility    Supine to Sit Maximal Assist   Sit to Supine Moderate Assist   Scooting Maximal Assist   Skilled Intervention Verbal Cuing;Sequencing   Comments  Writer called patient to discuss Dr. Higgins's message below. Marimar did not answer and message was left to call our office back. [phone].    maxA to reach EOB for trunk and BLE movement. Able to return to bed with less assist mainly for BLE. Seated scooting she was able to initiate however maxA to complete   Functional Mobility   Sit to Stand Moderate Assist   Bed, Chair, Wheelchair Transfer Refused   Comments STS 3x with HHA and modA.   Short Term Goals    Short Term Goal # 1 pt will be able to complete supine<>Sitting from flat bed with min assist in 6tx in order to progress to home   Goal Outcome # 1 goal not met   Short Term Goal # 2 pt will be able to complete functional transfers with FWW and min assist in 6tx in order to progress with PT   Goal Outcome # 2 Goal not met   Short Term Goal # 3 pt will be able to ambulate 50ft with FWW and min assist in 6tx in order to progress to home   Goal Outcome # 3 Goal not met

## 2022-09-01 NOTE — DISCHARGE PLANNING
Case Management Discharge Planning    Admission Date: 8/27/2022  GMLOS: 6  ALOS: 5    6-Clicks ADL Score: 15  6-Clicks Mobility Score: 10  PT and/or OT Eval ordered: Yes  Post-acute Referrals Ordered: Yes  Post-acute Choice Obtained: Yes  Has referral(s) been sent to post-acute provider:  Yes      Anticipated Discharge Dispo: Discharge Disposition: D/T to SNF with Medicare cert in anticipation of skilled care (03)    DME Needed: No    Action(s) Taken: Pt is medically clear for SNF. She has been accepted by [facility], however, they do not have an available bed today. Voicemails were left with pending SNFs. Updated MD and spouse.     Escalations Completed: None    Medically Clear: Yes    Next Steps: Follow up with SNFs.     Barriers to Discharge: Pending Placement    Is the patient up for discharge tomorrow: Yes    Is transport arranged for discharge disposition: No

## 2022-09-01 NOTE — PROGRESS NOTES
Received report from night shift RN. Assumed patient care at 0715. Assessment completed. A&OX2, disoriented to place and situation. Pain 10/10, medicated per MAR.      RN attempted to get patient up to chair this morning and place waffle cushion on bed, patient refused despite education, stating 'I'm just not ready, give me some time'.     Bed in lowest position, bed locked, bed alarm on for safety, treaded socks in place, RN and CNA numbers provided, call light within reach. Hourly rounding in place.

## 2022-09-01 NOTE — CARE PLAN
The patient is Stable - Low risk of patient condition declining or worsening    Shift Goals  Clinical Goals: Pain management, up to chair for meals  Patient Goals: Pain management  Family Goals: N/A    Progress made toward(s) clinical / shift goals:    Problem: Pain - Standard  Goal: Alleviation of pain or a reduction in pain to the patient’s comfort goal  Outcome: Progressing  PRN pain medications administered per MAR     Problem: Venous Thromboembolism (VTE) Prevention  Goal: The patient will remain free from venous thromboembolism (VTE)  Outcome: Progressing  SCD sleeves in place     Problem: Skin Integrity  Goal: Skin integrity is maintained or improved  Outcome: Progressing  Waffle overlay in place.

## 2022-09-01 NOTE — DISCHARGE PLANNING
Agency/Facility Name: [facility]  Outcome: Left a voicemail in regards to confirming acceptance. Aravind was going to confirm everything was okay with the medical director before giving the green light. DPA waiting for callback.     @1132  Agency/Facility Name: [facility]  Spoke To: Aravind   Outcome: Won't be able to take pt until Monday/Tuesday. Stated it has to do with the schedule of PT there and it is full until then. Informed Aravind we may explore other options and will follow up with any change in plans.     @1138  Agency/Facility Name: [facility]   Spoke To: Daryl   Outcome: Will review referral again to see if pt will be able to follow commands. Informed Daryl that family will provide medications she takes during her stay. Waiting for callback.     @1143  Agency/Facility Name: [facility]  Outcome: Left a voicemail with Amie in regards to referral status. Waiting for callback.     @1145  Agency/Facility Name: [facility]   Outcome: Left a voicemail with Lanre in regards to referral status.

## 2022-09-01 NOTE — PROGRESS NOTES
Orthopaedic Progress Note    Interval changes:  Patient doing well   Right hip prevena vac dressing in place without leak  Cleared for DC to SNF by ortho pending medicine clearance    ROS - Patient denies any new issues, but has min/mod confusion.  Pain well controlled      BP (!) 141/78   Pulse 90   Temp 36.7 °C (98.1 °F) (Temporal)   Resp 16   Wt 53 kg (116 lb 13.5 oz)   SpO2 97%       Patient seen and examined  No acute distress  Breathing non labored  RRR  Right hip surgical vac dressing is clean, dry, intact, and without leak. Patient clearly fires tibialis anterior, EHL, and gastrocnemius/soleus. Sensation is intact to light touch throughout superficial peroneal, deep peroneal, tibial, saphenous, and sural nerve distributions. Strong and palpable 2+ dorsalis pedis and posterior tibial pulses with capillary refill less than 2 seconds. No lower leg tenderness or discomfort.     Recent Labs     08/31/22  0645 08/31/22  1557 09/01/22  0427   WBC 5.8 6.9 5.5   RBC 2.49* 2.88* 2.65*   HEMOGLOBIN 7.6* 8.8* 8.1*   HEMATOCRIT 22.9* 26.4* 24.8*   MCV 92.0 91.7 93.6   MCH 30.5 30.6 30.6   MCHC 33.2* 33.3* 32.7*   RDW 53.8* 53.4* 53.2*   PLATELETCT 137* 178 152*   MPV 8.3* 8.9* 9.1       Active Hospital Problems    Diagnosis     Displaced fracture of right femoral neck (HCC) [S72.001A]      Priority: High    Subdural hematoma, post-traumatic (HCC) [S06.5X9A]      Priority: High    Acute blood loss anemia [D62]      Priority: Medium    Contraindication to deep vein thrombosis (DVT) prophylaxis [Z53.09]      Priority: Medium    Frequent falls [R29.6]      Priority: Medium    Chronic nausea [R11.0]      Priority: Medium    GERD (gastroesophageal reflux disease) [K21.9]      Priority: Medium    Chronic pain syndrome [G89.4]      Priority: Medium    Other hyperlipidemia [E78.49]      Priority: Medium    History of liver transplant (HCC) [Z94.4]      Priority: Medium    Primary pulmonary hypertension (HCC) [I27.0]       Priority: Medium    Right elbow pain [M25.521]      Priority: Low    Discharge planning issues [Z02.9]      Priority: Low    Trauma [T14.90XA]      Priority: Low    Pulmonary hypertension (HCC) [I27.20]      Priority: Low     Initial evaluation at [facility] pre liver transplant, PFO closed w/o complication, 1/25/2011, then worse PAH and R HF post transplant, [place] [facility] [place] and drug [place], responding to tadalafil and ambrisentan dramatically. Followed there with serial R heart cath.  3/25/2014: Most recent R heart cath done Dr. Brenda Flores with mild pulmonary hypertension and relatively high ouput  November 2014: Echocardiogram with normal LV size, LVEF 60-65%. Mild MR, mild AI, mild TR, RVSP 29-34mmHg.  February 2015: Echocardiogram with mild concentric LVH, LVEF 65-70%. Mild MR, mild AI, trace TR, RVSP 16mmHg.      DUC (generalized anxiety disorder) [F41.1]      Priority: Low     IMO load March 2020      Hypothyroidism [E03.9]      Priority: Low    Encephalopathy acute [G93.40]     Seizure disorder (HCC) [G40.909]     S/P bariatric surgery [Z98.84]      Overview:   Added automatically from request for surgery [id number]         Assessment/Plan:  Patient doing well   Right hip prevena vac dressing in place without leak  Cleared for DC to SNF by ortho pending medicine clearance  POD#4 S/P Right hip hemiarthroplasty   Wt bearing status - WBAT RLE with post hip precautions  Wound care/Drains - Dressings to be changed every other day by nursing  Future Procedures - none planned   Sutures/Staples out- 14 days post operatively  PT/OT-initiated  Antibiotics: Perioperative completed  DVT Prophylaxis- TEDS/SCDs/Foot pumps  [place]-none  Case Coordination for Discharge Planning - Disposition SNF

## 2022-09-01 NOTE — CARE PLAN
The patient is Stable - Low risk of patient condition declining or worsening    Shift Goals  Clinical Goals: Pain Management; Stable Hgb/Hct; Safety  Patient Goals: Rest; Pain Management  Family Goals: N/A    Progress made toward(s) clinical / shift goals:    Problem: Communication  Goal: The ability to communicate needs accurately and effectively will improve  Outcome: Progressing   The pt is able to communicate her needs, concerns and questions. Will continue to encourage the patient to advocate for herself.   Problem: Mobility  Goal: Patient's capacity to carry out activities will improve  Outcome: Progressing   The pt has many mobility needs that are being addressed by PT/ OT and the staff. Mobilizing patient in her room when possible.     Patient is not progressing towards the following goals:

## 2022-09-02 PROBLEM — Z02.9 DISCHARGE PLANNING ISSUES: Status: RESOLVED | Noted: 2022-01-01 | Resolved: 2022-01-01

## 2022-09-02 PROBLEM — D62 ACUTE BLOOD LOSS ANEMIA: Status: RESOLVED | Noted: 2022-01-01 | Resolved: 2022-01-01

## 2022-09-02 PROBLEM — R11.0 CHRONIC NAUSEA: Status: RESOLVED | Noted: 2018-01-25 | Resolved: 2022-01-01

## 2022-09-02 PROBLEM — M25.521 RIGHT ELBOW PAIN: Status: RESOLVED | Noted: 2022-01-01 | Resolved: 2022-01-01

## 2022-09-02 PROBLEM — S72.001A DISPLACED FRACTURE OF RIGHT FEMORAL NECK (HCC): Status: RESOLVED | Noted: 2022-01-01 | Resolved: 2022-01-01

## 2022-09-02 PROBLEM — Z75.8 DISCHARGE PLANNING ISSUES: Status: RESOLVED | Noted: 2022-01-01 | Resolved: 2022-01-01

## 2022-09-02 PROBLEM — G93.40 ENCEPHALOPATHY ACUTE: Status: RESOLVED | Noted: 2022-01-01 | Resolved: 2022-01-01

## 2022-09-02 NOTE — DISCHARGE PLANNING
Case Management Discharge Planning    Admission Date: 8/27/2022  GMLOS: 6  ALOS: 6    6-Clicks ADL Score: 15  6-Clicks Mobility Score: 7  PT and/or OT Eval ordered: Yes  Post-acute Referrals Ordered: Yes  Post-acute Choice Obtained: Yes  Has referral(s) been sent to post-acute provider:  Yes      Anticipated Discharge Dispo: Discharge Disposition: D/T to SNF with Medicare cert in anticipation of skilled care (03)    DME Needed: No    Action(s) Taken: Pt was accepted by [facility] and they have an available bed for today. Updated spouse and he is in agreement with the transfer. GMT to pick pt up around 1430. COBRA and transfer packet given to RN.     Escalations Completed: None    Medically Clear: Yes    Next Steps: SW will remain available as needed.     Barriers to Discharge: None    Is the patient up for discharge tomorrow: No

## 2022-09-02 NOTE — CARE PLAN
The patient is Stable - Low risk of patient condition declining or worsening    Shift Goals  Clinical Goals: Pain management, up to chair for meals  Patient Goals: Pain management  Family Goals: N/A    Progress made toward(s) clinical / shift goals:    Problem: Pain - Standard  Goal: Alleviation of pain or a reduction in pain to the patient’s comfort goal  Outcome: Progressing   The patient's pain is being controlled with oral pain medications at this time   Problem: Venous Thromboembolism (VTE) Prevention  Goal: The patient will remain free from venous thromboembolism (VTE)  Outcome: Progressing   The patient is swearing her SCDs at this time   Problem: Discharge Barriers/Planning  Goal: Patient's continuum of care needs are met  Outcome: Progressing   Nursing staff and social work and working witht he patient and family in getting the patient discharged. Waiting on bed availability and medical clearance.   Problem: Bowel Elimination  Goal: Establish and maintain regular bowel function  Outcome: Not Progressing   The patient has not had a BM since 8/28, scheduled and PRN medications given .     Patient is not progressing towards the following goals:      Problem: Bowel Elimination  Goal: Establish and maintain regular bowel function  Outcome: Not Progressing

## 2022-09-02 NOTE — DISCHARGE PLANNING
DC Transport Scheduled    Received request at: 1030    Transport Company Scheduled:  GMT    Scheduled Date: 09/02/22  Scheduled Time: 1630    Destination: ([facility] [address] [place] [address], [id number],     Notified care team of scheduled transport via Voalte.     If there are any changes needed to the DC transportation scheduled, please contact [facility] at ext. [phone] between the hours of 3727-3388 Mon-Fri. If outside those hours, contact the ED Case Manager at ext. [phone].

## 2022-09-02 NOTE — PROGRESS NOTES
Patient discharged to Skilled Nursing Facility via Medical Transport. VSS upon transfer. Personal belongings with patient. Home meds given to family member at bedside.Education provided on discharge instructions, medications, and self care. Discharge instructions, prescriptions, and personal belongings with the patient upon leaving the unit.  Attempted to give report to [facility] x3 times, but no one available to take report. Will continue to re-attempt.

## 2022-09-02 NOTE — DISCHARGE PLANNING
"HTH/SCP TCN chart review completed. Collaborated with SW/CM and DPA. Noted as prior, per chart and discussion with DPA that \"family will be able to provide medications\" for pt to admit to SNF. Noted per chart and SW/CM/DPA that pt is now accepted to [facility] and at time of writing this note GMT transportation is set for 1430. Appreciate DPA follow up with SNFs for acceptance (see prior TCN note 9/1). TCN will continue to follow and collaborate with discharge planning team as additional post acute needs arise. Thank you.     Previously completed:  - Orders received for: PT and OT consult -> rec placement  - Choice forms: IRF, SNF, HH   - GSC sent   "

## 2022-09-02 NOTE — CARE PLAN
The patient is Stable - Low risk of patient condition declining or worsening    Shift Goals  Clinical Goals: Pain management, up to chair for meals  Patient Goals: Pain management  Family Goals: N/A    Progress made toward(s) clinical / shift goals:      Problem: Pain - Standard  Goal: Alleviation of pain or a reduction in pain to the patient’s comfort goal  Note: Based upon non-verbal scale, patient appears calm with non-labored breathing.      Problem: Knowledge Deficit - Standard  Goal: Patient and family/care givers will demonstrate understanding of plan of care, disease process/condition, diagnostic tests and medications  Note: POC discussed. Patient alert and oriented x2.      Problem: Skin Integrity  Goal: Skin integrity is maintained or improved  Note: On waffle cushion overlay. Q2hr turns in place.      Problem: Fall Risk  Goal: Patient will remain free from falls  Note: High Fall risk. Bed alarm in place.        Patient is not progressing towards the following goals:

## 2022-09-02 NOTE — DISCHARGE SUMMARY
Discharge Summary    CHIEF COMPLAINT ON ADMISSION  Chief Complaint   Patient presents with    Hip Pain     right    GLF     Rolled out of bed       Reason for Admission  ems    Admission Date  8/27/2022     CODE STATUS  Full Code    HPI & HOSPITAL COURSE  This is a 62-year-old female with a past medical history significant for liver transplant, on chronic immunosuppressive medication on mycophenolate and tacrolimus, pulmonary hypertension, seizure disorder on keppra 750 mg po bid , chronic pain syndrome generalized anxiety,s/p bariatric surgery, presented to the ER on 8/27/2022 after she had a ground level fall.    She reports that she fell out of her bed and landed on the right hip.  CT head showed right holohemispheric acute to chronic subdural hematoma . Dr Okeefe of neurosurgery has evaluated the patient, stated no surgical intervention.  She was initially admitted to the trauma service to the ICU    She was also found to have acute angulated fracture of the right femoral neck, orthopedic surgery was consulted,, patient underwent right hip hemiarthroplasty on 8/20/2022 by Dr. Ferrer. Per orthopedic surgery, patient can bear weight as tolerated with posterior hip precaution.  PT/OT to evaluate the patient medical and postacute.  Patient is medically cleared to be discharged to skilled nursing facility.    Per request of hepatologist, GGT and Prograf level has been ordered. [facility] discussed with Dr. Okeefe who recommended refraining Lovenox until at least 7 days post-fall which will be 9/4/20222. She will need dvt ppx     Her hospital course was complicated with acute blood centimeter patient is a 2 years of PRBC transfusion hemoglobin has remained stable, today at 7.9  No active signs of bleeding noted.    Prior history of liver transplant events depression medication including tacrolimus  2 mg p.o. twice daily, mycophenolate 250 p.o. twice daily.  Regarding her pulmonary hypertension, she will continue  tadalafil 20 mg p.o. daily, Lasix 20 mg p.o. daily, Ambrisentan 10 mg po qd.       Of note patient has been taking MS Contin 15 twice daily, clonazepam 0.5 3 times daily as needed along with oxycodone 10 mg p.o. twice daily as needed as an outpatient which was continued at the time of discharge.    For all the other chronic medical condition,she will resume her home meds    Therefore, she is discharged in fair and stable condition to skilled nursing facility.    The patient met 2-midnight criteria for an inpatient stay at the time of discharge.      FOLLOW UP ITEMS POST DISCHARGE  Halley Levine M.D.  Please follow up with Dr Ferrer as an op     DISCHARGE DIAGNOSES  Principal Problem:    Subdural hematoma, post-traumatic (HCC) POA: Yes  Active Problems:    Hypothyroidism POA: Yes    DUC (generalized anxiety disorder) POA: Yes      Overview: IMO load March 2020    History of liver transplant (HCC) POA: Yes    Other hyperlipidemia POA: Yes    Pulmonary hypertension (HCC) POA: Unknown      Overview: Initial evaluation at [facility] pre liver transplant, PFO closed w/o       complication, 1/25/2011, then worse PAH and R HF post transplant,       [place] [facility] [place] and drug [place], responding       to tadalafil and ambrisentan dramatically. Followed there with serial R       heart cath.      3/25/2014: Most recent R heart cath done Dr. Brenda Flores with mild       pulmonary hypertension and relatively high ouput      November 2014: Echocardiogram with normal LV size, LVEF 60-65%. Mild MR,       mild AI, mild TR, RVSP 29-34mmHg.      February 2015: Echocardiogram with mild concentric LVH, LVEF 65-70%. Mild       MR, mild AI, trace TR, RVSP 16mmHg.    Chronic pain syndrome POA: Yes    GERD (gastroesophageal reflux disease) POA: Yes    S/P bariatric surgery POA: Yes      Overview: Overview:       Added automatically from request for surgery [id number]    Chronic nausea POA: Yes     Primary pulmonary hypertension (HCC) POA: Yes    Displaced fracture of right femoral neck (HCC) POA: Yes    Trauma POA: Yes    Contraindication to deep vein thrombosis (DVT) prophylaxis POA: Yes    Frequent falls POA: Yes    Right elbow pain POA: Yes    Discharge planning issues POA: Yes    Acute blood loss anemia POA: Yes    Encephalopathy acute POA: Yes    Seizure disorder (HCC) POA: Yes  Resolved Problems:    Shortness of breath POA: Yes    Platelet dysfunction (HCC) POA: Yes      FOLLOW UP  Future Appointments   Date Time Provider Department Center   9/26/2022  1:00 PM Dl Jimenez M.D. [facility] None     No follow-up provider specified.    MEDICATIONS ON DISCHARGE     Medication List        START taking these medications        Instructions   polyethylene glycol/lytes 17 g Pack  Commonly known as: MIRALAX   Take 1 Packet by mouth 2 times a day.  Dose: 17 g     senna-docusate 8.6-50 MG Tabs  Commonly known as: PERICOLACE or SENOKOT S   Take 1 Tablet by mouth every evening.  Dose: 1 Tablet            CHANGE how you take these medications        Instructions   clonazePAM 0.5 MG Tabs  What changed:   when to take this  reasons to take this  Commonly known as: KLONOPIN   Take 1 Tablet by mouth 3 times a day as needed (anxiety) for up to 2 days. Indications: Feeling Anxious  Dose: 0.5 mg     levetiracetam 750 MG tablet  What changed: See the new instructions.  Commonly known as: KEPPRA   TAKE 2 TABLETS BY ENTERAL TUBE ROUTE 2 TIMES A DAY FOR 30 DAYS.     oxyCODONE immediate release 10 MG immediate release tablet  What changed:   when to take this  reasons to take this  Commonly known as: ROXICODONE   Take 1 Tablet by mouth 3 times a day as needed for Severe Pain for up to 2 days.  Dose: 10 mg     potassium chloride SA 10 MEQ Tbcr  What changed:   medication strength  how much to take  Commonly known as: K-DUR   Take 1 Tablet by mouth every day.  Dose: 10 mEq     sertraline 100 MG Tabs  What changed: when to take  this  Commonly known as: Zoloft   Take 1.5 Tablets by mouth every day.  Dose: 150 mg     tacrolimus 1 MG Caps  What changed: See the new instructions.  Commonly known as: PROGRAF   Take 2 Capsules by mouth 2 times a day.  Dose: 2 mg            CONTINUE taking these medications        Instructions   albuterol 108 (90 Base) MCG/ACT Aers inhalation aerosol  Commonly known as: ProAir HFA   Inhale 2 Puffs every four hours as needed for Shortness of Breath (wheezing).  Dose: 2 Puff     ambrisentan 10 MG tablet  Commonly known as: LETAIRIS   TAKE 1 TABLET BY MOUTH EVERY DAY  Dose: 10 mg     CALCIUM-VITAMIN D PO   Take 1 Tablet by mouth every evening.  Dose: 1 Tablet     fludrocortisone 0.1 MG Tabs  Commonly known as: FLORINEF   Take 0.1 mg by mouth every day.  Dose: 0.1 mg     furosemide 20 MG Tabs  Commonly known as: LASIX   Take 1 Tablet by mouth every day. Indications: Edema  Dose: 20 mg     lacosamide 100 MG Tabs tablet  Commonly known as: VIMPAT   Take 1 Tablet by mouth 2 times a day for 180 days.  Dose: 100 mg     levothyroxine 137 MCG Tabs  Commonly known as: SYNTHROID   Take 1 Tablet by mouth every morning on an empty stomach. Indications: Underactive Thyroid  Dose: 137 mcg     magnesium oxide 400 MG Tabs tablet  Commonly known as: MAG-OX   Take 400 mg by mouth every day.  Dose: 400 mg     metoclopramide 10 MG Tabs  Commonly known as: REGLAN   Take 10 mg by mouth 2 times a day.  Dose: 10 mg     morphine ER 15 MG Tbcr tablet  Commonly known as: MS CONTIN   Take 1 Tablet by mouth every 12 hours for 2 days.  Dose: 15 mg     mycophenolate 250 MG Caps  Commonly known as: CELLCEPT   Take 250 mg by mouth 2 times a day. Indications: Liver Transplant Recipient  Dose: 250 mg     omeprazole 40 MG delayed-release capsule  Commonly known as: PRILOSEC   Take 1 Capsule by mouth every day.  Dose: 40 mg     ondansetron 4 MG Tbdp  Commonly known as: ZOFRAN ODT   DISSOLVE 1 TAB ON TONGUE EVERY 8 HOURS AS NEEDED FOR NAUSEA      pravastatin 20 MG Tabs  Commonly known as: PRAVACHOL   Take 1 Tablet by mouth every day.  Dose: 20 mg     promethazine 25 MG Supp  Commonly known as: PHENERGAN   Insert 1 Suppository into the rectum every 6 hours as needed for Nausea/Vomiting.  Dose: 25 mg     tadalafil 20 MG tablet  Commonly known as: CIALIS   Take 1 Tablet by mouth every day.  Dose: 20 mg     therapeutic multivitamin-minerals Tabs   Take 1 Tablet by mouth every day.  Dose: 1 Tablet            STOP taking these medications      vitamin C 500 MG Chew              Allergies  Allergies   Allergen Reactions    Nsaids Unspecified     Can not take due to hx of liver transplant     Rhubarb Anaphylaxis    Organic Nitrates Unspecified     Nitroglycerin products should be avoided with the use of PDE-5 inhibitors as the combination can result in severe hypotension.  RD clarified with pt: Nitrates in food are okay and pt does not want nitrates to be listed as food allergy. Pt only avoids medications with nitrates.     Other Drug      Any medication that may interact with cyclosporine, tacrolimus, sirolimus, or prograf (due to hx of liver transplant)     Tylenol      Liver transplant    Vancomycin      Red man syndrome       DIET  Orders Placed This Encounter   Procedures    Diet Order Diet: Consistent CHO (Diabetic)     Standing Status:   Standing     Number of Occurrences:   1     Order Specific Question:   Diet:     Answer:   Consistent CHO (Diabetic) [4]       ACTIVITY  As tolerated and directed by skilled nursing.  Weight bearing as tolerated    LINES, DRAINS, AND WOUNDS  This is an automated list. Peripheral IVs will be removed prior to discharge.  Peripheral IV 09/01/22 22 G Anterior;Left Forearm (Active)   Site Assessment Clean;Dry;Intact 09/02/22 0826   Dressing Type Transparent;Transparent Film;Tape 09/02/22 0826   Line Status Scrubbed the hub prior to access;Flushed;Saline locked 09/02/22 0826   Dressing Status Clean;Dry;Intact 09/02/22 0826    Dressing Intervention N/A 09/02/22 0826   Dressing Change Due 09/08/22 09/01/22 2035   Date IV Connector(s) Changed 09/01/22 09/01/22 2035   Infiltration Grading ([facility], [facility]) 0 09/02/22 0826   Phlebitis Scale ([facility] Only) 0 09/02/22 0826       Wound 08/28/22 Incision Hip Right staples and prevena incision management system  (Active)   Site Assessment ADAM 09/02/22 0814   Periwound Assessment ADAM 09/02/22 0814   Margins ADAM 09/02/22 0814   Closure ADAM 09/02/22 0814   Drainage Amount None 09/01/22 1940   Treatments Site care;Offloading;Ice applied 09/01/22 1940   Wound Cleansing Not Applicable 09/01/22 1940   Periwound Protectant Not Applicable 09/01/22 1940   Dressing Cleansing/Solutions Not Applicable 09/01/22 1940   Dressing Options Wound Vac 09/01/22 1940   Dressing Changed Observed 09/02/22 0814   Dressing Status Clean;Dry;Intact 09/02/22 0814   Dressing Change/Treatment Frequency As Needed 09/02/22 0814       Peripheral IV 09/01/22 22 G Anterior;Left Forearm (Active)   Site Assessment Clean;Dry;Intact 09/02/22 0826   Dressing Type Transparent;Transparent Film;Tape 09/02/22 0826   Line Status Scrubbed the hub prior to access;Flushed;Saline locked 09/02/22 0826   Dressing Status Clean;Dry;Intact 09/02/22 0826   Dressing Intervention N/A 09/02/22 0826   Dressing Change Due 09/08/22 09/01/22 2035   Date IV Connector(s) Changed 09/01/22 09/01/22 2035   Infiltration Grading ([facility], [facility]) 0 09/02/22 0826   Phlebitis Scale ([facility] Only) 0 09/02/22 0826               MENTAL STATUS ON TRANSFER      Alert and orineted x 2-3       CONSULTATIONS  Ortho  Neurosurgery     PROCEDURES      LABORATORY  Lab Results   Component Value Date    SODIUM 132 (L) 09/02/2022    POTASSIUM 4.7 09/02/2022    CHLORIDE 101 09/02/2022    CO2 25 09/02/2022    GLUCOSE 115 (H) 09/02/2022    BUN 9 09/02/2022    CREATININE 0.34 (L) 09/02/2022        Lab Results   Component Value Date    WBC 4.7 (L) 09/02/2022    HEMOGLOBIN 7.9 (L) 09/02/2022     HEMATOCRIT 24.1 (L) 09/02/2022    PLATELETCT 166 09/02/2022        Total time of the discharge process exceeds 35 minutes.

## 2022-09-02 NOTE — DISCHARGE INSTRUCTIONS
Hip Fracture    A hip fracture is a break in the upper part of the thigh bone (femur). This is usually the result of an injury, commonly a fall.  What are the causes?  This condition may be caused by:  A direct hit or injury (trauma) to the side of the hip, such as from a fall or a car accident.  What increases the risk?  You are more likely to develop this condition if:  You have poor balance or an unsteady walking pattern (gait). Certain conditions contribute to poor balance, including Parkinson disease and dementia.  You have thinning or weakening of your bones, such as from osteopenia or osteoporosis.  You have cancer that spreads to the leg bones.  You have certain conditions that can weaken your bones, such as thyroid disorders, intestine disorders, or a lack (deficiency) of certain nutrients.  You smoke.  You take certain medicines, such as steroids.  You have a history of broken bones.  What are the signs or symptoms?  Symptoms of this condition include:  Pain over the injured hip. This is commonly felt on the side of the hip or in the front groin area.  Stiffness, bruising, and swelling over the hip.  Pain with movement of the leg, especially lifting it up. Pain often gets better with rest.  Difficulty or inability to stand, walk, or use the leg to support body weight (put weight on the leg).  The leg rolling outward when lying down.  The affected leg being shorter than the other leg.  How is this diagnosed?  This condition may be diagnosed based on:  Your symptoms.  A physical exam.  X-rays. These may be done:  To confirm the diagnosis.  To determine the type and location of the fracture.  To check for other injuries.  MRI or CT scans. These may be done if the fracture is not visible on an X-ray.  How is this treated?  Treatment for this condition depends on the severity and location of your fracture. In most cases, surgery is necessary. Surgery may involve:  Repairing the fracture with a screw, nail, or  neeta to hold the bone in place (open reduction and internal fixation, ORIF).  Replacing the damaged parts of the femur with metal implants (hemiarthroplasty or arthroplasty).  If your fracture is less severe, or if you are not eligible for surgery, you may have non-surgical treatment. Non-surgical treatment may involve:  Using crutches, a walker, or a wheelchair until your health care provider says that you can support (bear) weight on your hip.  Medicines to help reduce pain and swelling.  Having regular X-rays to monitor your fracture and make sure that it is healing.  Physical therapy. You may need physical therapy after surgery, too.  Follow these instructions at home:  Activity  Do not use your injured leg to support your body weight until your health care provider says that you can.  Follow standing and walking restrictions as told by your health care provider.  Use crutches, a walker, or a wheelchair as directed.  Avoid any activities that cause pain or irritation in your hip. Ask your health care provider what activities are safe for you.  Do not drive or use heavy machinery until your health care provider approves.  If physical therapy was prescribed, do exercises as told by your health care provider.  General instructions  Take over-the-counter and prescription medicines only as told by your health care provider.  If directed, put ice on the injured area:  Put ice in a plastic bag.  Place a towel between your skin and the bag.  Leave the ice on for 20 minutes, 2-3 times a day.  Do not use any products that contain nicotine or tobacco, such as cigarettes and e-cigarettes. These can delay bone healing. If you need help quitting, ask your health care provider.  Keep all follow-up visits as told by your health care provider. This is important.  How is this prevented?  To prevent falls at home:  Use a cane, walker, or wheelchair as directed.  Make sure your rooms and hallways are free of clutter, obstacles, and  "cords.  Install grab bars in your bedroom and bathrooms.  Always use handrails when going up and down stairs.  Use nightlights around the house.  Exercise regularly. Ask what forms of exercise are safe for you, such as walking and strength and balance exercises.  Visit an eye doctor regularly to have your eyesight checked. This can help prevent falls.  Make sure you get enough calcium and vitamin D.  Do not use any products that contain nicotine or tobacco, such as cigarettes and e-cigarettes. If you need help quitting, ask your health care provider.  Limit alcohol use.  If you have an underlying condition that caused your hip fracture, work with your health care provider to manage your condition.  Contact a health care provider if:  Your pain gets worse or it does not get better with rest or medicine.  You develop any of the following in your leg or foot:  Numbness.  Tingling.  A change in skin color (discoloration).  Skin feeling cold to the touch.  Get help right away if:  Your pain suddenly gets worse.  You cannot move your hip.  Summary  A hip fracture is a break in the upper part of the thigh bone (femur).  Treatment typically require surgical management to restore stability and function to the hip.  Pain medicine and icing of the affected leg can help manage pain and swelling. Follow directions as told by your health care provider.  This information is not intended to replace advice given to you by your health care provider. Make sure you discuss any questions you have with your health care provider.    Hip Hemiarthroplasty  The hip joint is located where the upper end of the femur meets the pelvis socket (acetabulum). The femur, or thigh bone, looks like a long stem with a ball on the end. The acetabulum is a socket or cup-like structure in the pelvis, or hip bone. This \"ball and socket\" allows your hip to move.  During Hip hemiarthroplasty, your surgeon removes the diseased bone tissue and cartilage from " the hip joint. The healthy parts of the hip are left intact. The head of the femur (the ball) and the socket (acetabulum) is replaced with new, artificial parts. The new hip is made of materials that allow a normal movement of the joint. This surgery usually lasts 2 to 3 hours.   The purpose of this surgery is to reduce pain and improve range of motion. It is one of the most successful joint replacement surgeries. It most often greatly improves the quality of life.  LET YOUR CAREGIVERS KNOW ABOUT:  Allergies  Medications taken including herbs, eye drops, over the counter medications, and creams  Use of steroids (by mouth or creams)  Previous problems with anesthetics or Novocaine  Possibility of pregnancy, if this applies  History of blood clots (thrombophlebitis)  History of bleeding or blood problems  Previous surgery  Other health problems  BEFORE THE PROCEDURE  Do not eat or drink anything for as long as directed by your caregiver prior to surgery.  You should be present 60 minutes prior to your procedure or as directed.  Prior to surgery an IV (intravenous line for giving fluids) may be started. You may be given an anesthetic (medications and gas to help you sleep) during the procedure.   AFTER THE PROCEDURE  After surgery, you will be taken to the recovery area. There a nurse will watch and check your progress. You may have a catheter (a long, narrow, hollow tube) in your bladder following surgery. The catheter helps you pass your water. Once you're awake, stable, and taking fluids well, barring other problems you'll be returned to your room. You will receive physical therapy until you are doing well and your caregiver feels it is safe for you to be transferred home or to an extended care facility.  HOME CARE INSTRUCTIONS   You may resume normal diet and activities as directed or allowed.  Change dressings if necessary or as directed.  Only take over-the-counter or prescription medicines for pain, discomfort,  or fever as directed by your caregiver.  SEEK IMMEDIATE MEDICAL CARE IF:  You develop:  Swelling of your calf or leg or develop shortness of breath or chest pain.  Redness, swelling, or increasing pain in the wound.  Pus coming from wound.  An unexplained oral temperature over 102° F (38.9° C) develops.  A foul smell coming from the wound or dressing.  A breaking open of the wound (edges not staying together) after sutures or staples have been removed.  MAKE SURE YOU:   Understand these instructions.  Will watch your condition.  Will get help right away if you are not doing well or get worse.  Document Released: 01/02/2008 Document Revised: 03/11/2013 Document Reviewed: 01/29/2008  ExitCare® Patient Information ©2014 [employer].  Senna tablets or capsules  What is this medicine?  SENNA (SEN uh) is a laxative. This medicine is used to relieve constipation. It may also be used to empty and prepare the bowel for surgery or examination.  This medicine may be used for other purposes; ask your health care provider or pharmacist if you have questions.  COMMON BRAND NAME(S): Black Draught, Ex-Lax, Bri-erwin, Lax-Pills, Perdiem, Senexon, Senna, Senna-Lax, Senna-Tabs, Senna-Time, SennaGen, Sennatural, Senokot, Senokot Extra Strength, Senokot Xtra, SenoSol, SenoSol-X, Uni-Cenna  What should I tell my health care provider before I take this medicine?  They need to know if you have any of these conditions:  blockage in your bowel  inflammatory bowel disease  stomach or intestine problems  sudden change in bowel habits lasting more than 2 weeks  vomiting  an unusual or allergic reaction to senna, other medicines, foods, dyes, or preservatives  pregnant or trying to get pregnant  breast-feeding  How should I use this medicine?  Take this medicine by mouth with a full glass of water. Follow the directions on the product label. Take exactly as directed. Do not take your medicine more often than directed.  Talk to your pediatrician  regarding the use of this medicine in children. While this medicine may be prescribed for children as young as 2 years for selected conditions, precautions do apply.  Overdosage: If you think you have taken too much of this medicine contact a poison control center or emergency room at once.  NOTE: This medicine is only for you. Do not share this medicine with others.  What if I miss a dose?  If you miss a dose, take it as soon as you can. If it is almost time for your next dose, take only that dose. Do not take double or extra doses.  What may interact with this medicine?  Interactions are not expected. Do not use any other laxative products without talking to your healthcare professional.  This list may not describe all possible interactions. Give your health care provider a list of all the medicines, herbs, non-prescription drugs, or dietary supplements you use. Also tell them if you smoke, drink alcohol, or use illegal drugs. Some items may interact with your medicine.  What should I watch for while using this medicine?  Do not use for more than 1 week unless otherwise directed by your doctor or health care professional.  Stop using this medicine and contact your doctor or health care professional if you have rectal bleeding or do not have a bowel movement after use. These could be signs of a more serious condition.  What side effects may I notice from receiving this medicine?  Side effects that you should report to your doctor or health care professional as soon as possible:  allergic reactions like skin rash, itching or hives, swelling of the face, lips, or tongue  bloody or black, tarry stools  breathing problems  muscle weakness  stomach pain  unusually weak or tired  vomiting  Side effects that usually do not require medical attention (report to your doctor or health care professional if they continue or are bothersome):  discolored urine (red or orange)  gas  upset stomach  This list may not describe all  possible side effects. Call your doctor for medical advice about side effects. You may report side effects to FDA at 0-494-ZIT-5910.  Where should I keep my medicine?  Keep out of the reach of children.  Store at room temperature between 15 and 30 degrees C (59 and 86 degrees F). Keep container tightly closed. Throw away any unused medicine after the expiration date.  NOTE: This sheet is a summary. It may not cover all possible information. If you have questions about this medicine, talk to your doctor, pharmacist, or health care provider.  © 2020 Elsevier/Gold Standard (2019-02-08 09:09:21)      Polyethylene Glycol powder  What is this medicine?  POLYETHYLENE GLYCOL 3350 (aileen ee ETH i jose eduardo; GLYE col) powder is a laxative used to treat constipation. It increases the amount of water in the stool. Bowel movements become easier and more frequent.  This medicine may be used for other purposes; ask your health care provider or pharmacist if you have questions.  COMMON BRAND NAME(S): GaviLax, GIALAX, GlycoLax, Healthylax, MiraLax, Smooth LAX, [employer]  What should I tell my health care provider before I take this medicine?  They need to know if you have any of these conditions:  a history of blockage of the stomach or intestine  current abdomen distension or pain  difficulty swallowing  diverticulitis, ulcerative colitis, or other chronic bowel disease  phenylketonuria  an unusual or allergic reaction to polyethylene glycol, other medicines, dyes, or preservatives  pregnant or trying to get pregnant  breast-feeding  How should I use this medicine?  Take this medicine by mouth. The bottle has a measuring cap that is marked with a line. Pour the powder into the cap up to the marked line (the dose is about 1 heaping tablespoon). Add the powder in the cap to a full glass (4 to 8 ounces or 120 to 240 mL) of water, juice, soda, coffee or tea. Mix the powder well. Ensure that the powder is fully dissolved. Do not drink if  there are any clumps. Drink the solution. Take exactly as directed. Do not take your medicine more often than directed.  Talk to your pediatrician regarding the use of this medicine in children. Special care may be needed.  Overdosage: If you think you have taken too much of this medicine contact a poison control center or emergency room at once.  NOTE: This medicine is only for you. Do not share this medicine with others.  What if I miss a dose?  If you miss a dose, take it as soon as you can. If it is almost time for your next dose, take only that dose. Do not take double or extra doses.  What may interact with this medicine?  Interactions are not expected.  This list may not describe all possible interactions. Give your health care provider a list of all the medicines, herbs, non-prescription drugs, or dietary supplements you use. Also tell them if you smoke, drink alcohol, or use illegal drugs. Some items may interact with your medicine.  What should I watch for while using this medicine?  Do not use for more than 2 weeks without advice from your doctor or health care professional. It can take 2 to 4 days to have a bowel movement and to experience improvement in constipation. See your health care professional for any changes in bowel habits, including constipation, that are severe or last longer than three weeks.  Always take this medicine with plenty of water.  What side effects may I notice from receiving this medicine?  Side effects that you should report to your doctor or health care professional as soon as possible:  diarrhea  difficulty breathing  itching of the skin, hives, or skin rash  severe bloating, pain, or distension of the stomach  vomiting  Side effects that usually do not require medical attention (report to your doctor or health care professional if they continue or are bothersome):  bloating or gas  lower abdominal discomfort or cramps  nausea  This list may not describe all possible side  effects. Call your doctor for medical advice about side effects. You may report side effects to FDA at 0-709-RYG-6431.  Where should I keep my medicine?  Keep out of the reach of children.  Store between 15 and 30 degrees C (59 and 86 degrees F). Throw away any unused medicine after the expiration date.  NOTE: This sheet is a summary. It may not cover all possible information. If you have questions about this medicine, talk to your doctor, pharmacist, or health care provider.  © 2020 Elsevier/Gold Standard (2019-06-06 10:42:01)      Document Released: 12/18/2006 Document Revised: 09/07/2019 Document Reviewed: 01/20/2018  [employer] Patient Education © 2020 Elsevier Inc.

## 2022-09-02 NOTE — DISCHARGE PLANNING
Agency/Facility Name: [facility]   Spoke To: Anne Marie  Outcome: Pt has been accepted and can take her today. Requested transport for 10:30.

## 2022-09-04 NOTE — DOCUMENTATION QUERY
[facility]                                                                       Query Response Note      PATIENT:               VICK LI  ACCT #:                  [id number]  MRN:                     [id number]  :                      1960  ADMIT DATE:       2022 6:04 PM  DISCH DATE:        2022 5:02 PM  RESPONDING  PROVIDER #:        [id number]           QUERY TEXT:    Acute encephalopathy is documented in the Medical Record. Please specify type.    NOTE:  If an appropriate response is not listed below, please respond with a new note.    The patient's Clinical Indicators include:  Per Hospital Medicine Consultation   -Encephalopathy acute- (present on admission)  -This may be a combination of subdural hematoma, ICU delirium, and post-anesthesia  -Frequent reorienting, refrain from escalation of her home dose of Klonipin 0.5 mg TID change it to as needed  -Ammonia level has been ordered.    Per Progress Note   Encephalopathy acute- (present on admission)   -Resolved on    -- Able to answer alert and oriented questions    Treatment:   Neurosurgery Consultation, IV NS at 75ml/hr, continue home antiepileptics, pain control, transfusion 2 units platelets & 2 units PRBCs, IV iron, frequent reorientation, reduce Klonipin dose to as needed, & Physical & Occupational Therapy     Risk Factors:   Subdural hematoma, right femoral neck fracture, acute blood loss anemia, hx liver transplant on immunosuppression, pulmonary hypertension, seizure disorder, chronic pain syndrome, & frequent falls    Thank You,  Treva Centeno RN BSN  Clinical   Connect via [employer]  Options provided:   -- Acute metabolic encephalopathy   -- Acute encephalopathy due to other medical condition, (please specify other medical condition)   -- Other explanation of clinical findings, (Please specify other explanation of  clinical findings)   -- Unable to determine      Query created by: Treva Centeno on 9/2/2022 3:15 PM    RESPONSE TEXT:    Acute metabolic encephalopathy          Electronically signed by:  PHOENIX COLE MD 9/4/2022 7:40 AM

## 2022-09-17 NOTE — ED TRIAGE NOTES
"Chief Complaint   Patient presents with    Nose Bleed     Since aprox 1100     Pt BIB [facility] to ED lobby for above complaint. Pt sent here from [facility].  Pt admitted to this SNF after a repair of \"angulated fracture of the right femoral neck.\"  Pt is currently on Lovenox  Pt is alert and oriented, speaking in full sentences, follows commands and responds appropriately to questions. NAD. Resp are even and unlabored.  Pt placed in Family Room.  "
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
## 2022-09-17 NOTE — DISCHARGE PLANNING
MSW received call from bedside RN. Pt needs to return to [facility]. MSW met with pt and discussed returning. Pt requested MSW contact her  Nil. Nil stated ot needs to return to [facility]. MSW arranged transport with [facility] [place] got [phone]. Cost 142.66. MSW placed COBRA and packet on chart. Bedside RN to call [facility] for report.

## 2022-09-22 NOTE — TELEPHONE ENCOUNTER
ESTABLISHED PATIENT PRE-VISIT PLANNING     Patient was NOT contacted to complete PVP.     Note: Patient will not be contacted if there is no indication to call.     1.  Reviewed notes from the last few office visits within the medical group: Yes    2.  If any orders were placed at last visit or intended to be done for this visit (i.e. 6 mos follow-up), do we have Results/Consult Notes?           Labs - Labs were not ordered at last office visit.  Note: If patient appointment is for lab review and patient did not complete labs, check with provider if OK to reschedule patient until labs completed.         Imaging - Imaging was not ordered at last office visit.         Referrals - Referral ordered, patient was seen and consult notes are in chart. Care Teams updated  YES.    3. Is this appointment scheduled as a Hospital Follow-Up? Yes, visit was at [facility].     4.  Immunizations were updated in Epic using Reconcile Outside Information activity? Yes    5.  Patient is due for the following Health Maintenance Topics:   Health Maintenance Due   Topic Date Due    Annual Wellness Visit  Never done    IMM ZOSTER VACCINES (2 of 2) 01/29/2019    COVID-19 Vaccine (4 - Booster for Moderna series) 02/01/2022    IMM INFLUENZA (1) 09/01/2022         6.  AHA (Pulse8) form printed for Provider? N/A

## 2022-09-26 PROBLEM — S06.9XAS MAJOR NEUROCOGNITIVE DISORDER AS LATE EFFECT OF TRAUMATIC BRAIN INJURY WITHOUT BEHAVIORAL DISTURBANCE (HCC): Status: ACTIVE | Noted: 2022-01-01

## 2022-09-26 PROBLEM — F02.80 MAJOR NEUROCOGNITIVE DISORDER AS LATE EFFECT OF TRAUMATIC BRAIN INJURY WITHOUT BEHAVIORAL DISTURBANCE (HCC): Status: ACTIVE | Noted: 2022-01-01

## 2022-09-26 NOTE — PROGRESS NOTES
Subjective     Roxana Cuba is a 62 y.o. 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     HPI    Seizures: Still on Keppra 1500 mg, twice daily and Vimpat 100 mg, twice daily, she had been receiving medication consistently from her .since last seen, review of the records indicates she has been in and out of the hospital emergency room with varying complaints surrounding abdominal discomfort.  She also underwent colonoscopy, and revealing nothing of note.  She suffered from 2 falls while at home, seen in the emergency room first on August 21, and then on August 27.  With the latter visit, he was found to suffer from a rather significant right femoral fracture.  She underwent internal fixation and subsequently inpatient rehabilitation.  She has been at home for the last week.    I reviewed these records thoroughly, there was no documentation of mention of a generalized seizure which she evidently suffered from according to her .  He witnessed the event.  She had no premonitory symptoms, there was a post-drome of confusion, though she recovered completely.  CT scan of the brain was done on August 27 and then on August 28, presumably as a follow-up for a seizure.  These demonstrated a right parasagittal holohemispheric subacute and chronic fluid collection consistent with SDH, though there was a biconvex shape consistent with possible epidural blood.  There was no significant mass-effect.  There is no change in the scans.    Upon discharge, Keppra had been reduced in dose, the review of the records indicates she has always been on a 1500 mg twice daily regimen.  She is now on 750 mg twice daily for the last week, has had no seizure recurrences.  She remains on Vimpat 100 mg twice  daily.    Unfortunately, she has not followed through with the outpatient EEG as I recommended.    Headaches: Originally on Emgality 120 mg injections, she was seen in the emergency room, though for other reasons of abdominal discomfort, but was complaining of the headaches she has been suffering from which did not seem to respond well to the Emgality and also she was complaining of other side effects presumably from the drug.  She stopped the drug, the symptoms resolved.  Over this time the headaches actually have improved, there was no mention of headaches with the multiple emergency room and hospitalizations she has had.    Cognitive disorder: She underwent thorough neuropsychological evaluation through this office, initially on May 4, follow-up on May 12 and then again on July 22, 2022.  The findings were 1 of a posttraumatic, major cognitive disorder related to the subdural and contusional insults involving frontal, subfrontal and left temporal structures.  She had significant executive dysfunction combined with anxiety and depression, making the mild language deficit she suffers from more problematic.  It was recommended that a psychiatrist be obtained to help with the mood symptoms she was experiencing, she deferred.  Referral to the [facility] for longer-term cognitive therapy was also made, though she has not followed through with this as well.  It was not felt that she was suffering from an underlying degenerative process, but as a result, the deficit she is presently experiencing are likely permanent.    She is still on Zoloft 150 mg daily, Klonopin evidently was added, though there is no indication of this in the electronic health record.    Medical, surgical and family histories are reviewed, there are no new drug allergies.  She is on Vimpat 100 mg twice daily, Keppra 750 mg twice daily, CellCept, Synthroid, Florinef, Zoloft 150 mg twice daily, Prograf, Prilosec, Letairis, Pravachol, Lasix, K-Dur, and  "Cialis.    Review of Systems   Neurological:  Positive for focal weakness. Negative for speech change and headaches.   Psychiatric/Behavioral:  Positive for memory loss. Negative for depression. The patient is nervous/anxious.    All other systems reviewed and are negative.    Objective     BP (!) 84/52 (BP Location: Right arm, Patient Position: Sitting, BP Cuff Size: Adult)   Pulse 77   Temp 37.4 °C (99.4 °F) (Temporal)   Resp 15   Ht 1.753 m (5' 9\")   Wt 53.5 kg (117 lb 15.1 oz)   LMP 01/03/2000   SpO2 99%   BMI 17.42 kg/m²      Physical Exam    She appears in a wheelchair, and is in no acute distress.  She is cooperative.  Vital signs do show a low blood pressure at 84/52, on a downward trend from August 27, 2022 at 102/56.  She does feel a little dizzy when she tries to stand though this is limited because of her right hip surgery and limited weightbearing capability at this time.  There is no malar rash.  The neck is supple.  Cardiac evaluation is unremarkable.     Neurological Exam    She is near fully oriented, there is still the productive aphasia, occasional word finding difficulties and paraphasic errors used.  If given enough time, she can follow commands consistently, but there is still some right/left confusion.  Verbal comprehension slightly more curtailed than visual comprehension.  There is no apraxia or inattention.  Mental processing speed is slowed.  She is still distractible.    PERRLA/EOMI, visual fields are full to finger counting on confrontation bilaterally.  There is only a subtle flattening of the right nasolabial fold, smile is symmetric.  There is no bulbar dysfunction, the tongue and uvula are midline.  There is a slight distortion of light touch on the right side of the face when compared to the left though she now feels temperature equally.    Musculoskeletal exam again reveals slight drift with right upper extremity only.  Strength is grossly intact in the upper extremities, " strength assessment in the right lower extremity limited though distally as well as knee extension, strength is symmetric in both lower extremities.    The right side reflex predominance is unchanged.    Repetitive movements with the right foot and right hand are slowed when compared to the left.  There is no appendicular dystaxia with the upper extremities and left lower extremity.  This cannot be accurately assessed with the right leg.  I obviously cannot stand the patient to walk.    Sensory exam reveals the subjective distortion of light touch of the right arm and leg when compared to the left.  There is a slightly diminished perception of temperature now only in the right upper extremity when compared to the left.    Assessment & Plan     1. Seizure disorder (HCC)  Though she has been actually on a lower dose of Keppra for the last week, Steady state now achieved, I am still uncomfortable with her risk of recurrent fracture, fall, etc. with any seizure recurrence.  She was tolerating the higher dose of Keppra, I have elected to keep her on a 1500 mg twice daily regimen for now.  Vimpat will be continued unchanged as well.  An EEG will be obtained as a baseline study.  We can talk about reducing Keppra moving forwards, for now we maintain status quo from a safety standpoint.    The incidental abnormalities on CT imaging from August 27 are not insignificant structurally, but there was no change from day today, and she is certainly showing no signs of right hemispheric dysfunction on examination save for some mild right/left confusion.  Repeat imaging will likely be necessary to follow.    - levetiracetam (KEPPRA) 750 MG tablet; Take 2 Tablets by mouth 2 times a day.  Dispense: 360 Tablet; Refill: 3  - lacosamide (VIMPAT) 100 MG Tab tablet; Take 1 Tablet by mouth 2 times a day for 180 days.  Dispense: 60 Tablet; Refill: 5    2. Chronic migraine without aura, intractable, without status migrainosus  Given that the  Emgality was poorly tolerated, this is certainly not something we would reconsider moving forward for headache recurrence.  Fortunately, posttraumatic migraine disorder is typically resolve spontaneously, I suspect that is what is actually happening.  We will simply follow along and treat any recurrent headaches on an as-needed basis only.    3. 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  Unfortunately she is very uncomfortable with seeing a therapist who can skillfully modulate any psychotropic medications she might require.  Neurocognitive rehabilitation also was recommended, but again she does not seem to be on board with these recommendations given to her.  At least we know there is nothing here that would suggest a progressive disorder that was preextant, that the cognitive symptoms she has are static and not likely to progress.    For now we will follow-up in 6 months, they will notify the office for any seizure recurrence.    Time: 40 minutes in total spent in patient care including precharting, record review, discussion with healthcare staff and documentation.  This includes face-to-face time for exam, review, discussion, as well as counseling and coordinating care.

## 2022-09-28 PROBLEM — Z78.9 ALCOHOL USE: Status: ACTIVE | Noted: 2022-01-01

## 2022-09-28 PROBLEM — R91.1 PULMONARY NODULE: Status: ACTIVE | Noted: 2022-01-01

## 2022-09-28 PROBLEM — Z98.890 HISTORY OF CRANIOTOMY: Status: ACTIVE | Noted: 2022-01-01

## 2022-09-28 PROBLEM — Z87.898 HISTORY OF SEIZURES: Status: ACTIVE | Noted: 2022-01-01

## 2022-09-28 PROBLEM — S01.01XA SCALP LACERATION, INITIAL ENCOUNTER: Status: ACTIVE | Noted: 2022-01-01

## 2022-09-28 PROBLEM — M54.50 LOW BACK PAIN: Status: ACTIVE | Noted: 2022-01-01

## 2022-09-28 NOTE — THERAPY
Physical Therapy   Initial Evaluation     Patient Name: Roxana Cuba  Age:  62 y.o., Sex:  female  Medical Record #: [id number]  Today's Date: 9/28/2022     Precautions  Precautions: Fall Risk;Swallow Precautions ( See Comments);Posterior Hip Precautions;Weight Bearing As Tolerated Right Lower Extremity  Comments: recent R hip replacement    Assessment  Patient is 62 y.o. female admitted post fall with traumatic subdural hematoma. PMH includes frequent falls, seizures, craniotomy, liver transplant, chronic use of opioids, etoh abuse, etoh abuse, and R hip hemiarthroplasty on 8/28/2022. Pt mildly confused during evaluation stating she was returning from [place] when she fell. Min assist required for bed mob and STS. Once in standing mod assist required for side stepping EOB with HHA. PT will cont while pt is in acute care setting to address safety, strength, balance, activity tolerance, and mobility.     Plan    Recommend Physical Therapy 4 times per week until therapy goals are met for the following treatments:  Bed Mobility, Gait Training, Neuro Re-Education / Balance, Self Care/Home Evaluation, Therapeutic Activities, and Therapeutic Exercises    DC Equipment Recommendations: Unable to determine at this time  Discharge Recommendations: Recommend post-acute placement for additional physical therapy services prior to discharge home          09/28/22 0938   Vitals   O2 (LPM) 2   O2 Delivery Device Silicone Nasal Cannula   Prior Living Situation   Prior Services Unable To Determine At This Time   Housing / Facility 2 Story House   Steps Into Home 1   Steps In Home 14   Equipment Owned Single Point Cane;Front-Wheel Walker   Lives with - Patient's Self Care Capacity Spouse   Comments information collected from last admission. Per notes pts bed and bath are on the first floor   Prior Level of Functional Mobility   Bed Mobility Unable To Determine At This Time   Comments unreliable historian at this time   History of  Falls   History of Falls Yes   Date of Last Fall   (reason for admission)   Cognition    Cognition / Consciousness X   Orientation Level Not Oriented to Reason   Level of Consciousness Alert   Ability To Follow Commands 1 Step   Safety Awareness Impaired;Impulsive   New Learning Impaired   Comments pt told therapist she was [place] the [place]   Active ROM Lower Body    Active ROM Lower Body  WDL   Strength Lower Body   Lower Body Strength  X   Gross Strength Generalized Weakness, Equal Bilaterally   Sensation Lower Body   Lower Extremity Sensation   Not Tested   Balance Assessment   Sitting Balance (Static) Fair   Sitting Balance (Dynamic) Fair -   Standing Balance (Static) Poor +   Standing Balance (Dynamic) Poor   Weight Shift Sitting Fair   Weight Shift Standing Poor   Comments HHA in standing   Gait Analysis   Gait Level Of Assist Moderate Assist   Assistive Device Hand Held Assist   Distance (Feet) 2   # of Times Distance was Traveled 1   Deviation Shuffled Gait;Bradykinetic;Increased Base Of Support   Weight Bearing Status no restrictions   Comments side stepping   Bed Mobility    Supine to Sit Minimal Assist   Sit to Supine Minimal Assist   Scooting Minimal Assist   Comments HOB raised   Functional Mobility   Sit to Stand Minimal Assist   Mobility EOB, STS, side stepping   Edema / Skin Assessment   Edema / Skin  Not Assessed   Patient / Family Goals    Patient / Family Goal #1 none stated   Short Term Goals    Short Term Goal # 1 pt will be able to complete supine<>Sitting from flat bed with SPV in 6tx in order to progress to home   Short Term Goal # 2 pt will be able to complete functional transfers with FWW and SPV in 6tx in order to decrease fall risk   Short Term Goal # 3 pt will be able to ambulate 25ft with FWW and min assist in 6tx in order to progress to home   Education Group   Education Provided Role of Physical Therapist   Role of Physical Therapist  Patient Response Patient;Acceptance;Explanation;Reinforcement Needed;Action Demonstration   Anticipated Discharge Equipment and Recommendations   DC Equipment Recommendations Unable to determine at this time   Discharge Recommendations Recommend post-acute placement for additional physical therapy services prior to discharge home

## 2022-09-28 NOTE — PROGRESS NOTES
Dr. Okeefe at bedside to assess patient. OK for q2h neuro checks. OK to transfer if repeat CT and neuro status stable.

## 2022-09-28 NOTE — ED NOTES
Holbrook catheter placed and urine sent to lab  ERP at  and irrigated/cleaned lac on the back L side of head, stapled closed  Pt cleaned up and new linens provided w/ warm blankets

## 2022-09-28 NOTE — ASSESSMENT & PLAN NOTE
5.5 mm right mixed density right holohemispheric subdural hematoma 2.2 mm left holohemispheric subdural hematoma. Right sylvian subarachnoid hemorrhages.  BIG 3.  Repeat head CT stable.  Non-operative management.  Speech Language Pathology cognitive evaluation pending.  Follow up for repeat CT head in 2 weeks.  Maxwell Okeefe MD. Neurosurgeon. [facility].

## 2022-09-28 NOTE — ED NOTES
Pt found to be removing monitor cords, blankets, and socks w/out reason  Pt becoming more restless, anxious, and altered

## 2022-09-28 NOTE — ED PROVIDER NOTES
"ED Provider Note    Scribed for Jr Tamez by Gama Cramer. 9/28/2022  3:58 AM    Primary care provider: Halley Levine M.D.  Means of arrival: EMS  History obtained from: EMS, patient  History limited by: AMS    CHIEF COMPLAINT  Chief Complaint   Patient presents with    Trauma Green     BIB [facility], [facility]F, - thinners. Pt felt dizzy and passed out. GCS 15 on scene, 13 on arrival. Pt lost approx 50-100cc of blood from head lac.     HPI  Roxana Cuba is a 62 y.o. female who presents to the Emergency Department via EMS as a trauma green for evaluation after a ground level fall that occurred just prior to arrival. Per EMS, patient was walking to the bathroom when she started to feel dizzy, so she went back and ended up having a ground level fall. Denies tripping. EMS notes family reported that patient seemed altered and was \"out of it\" after the fall. She had a GCS of 15 upon arrival of EMS. Patient complains of pain to the back of her head. EMS states patient was actively bleeding on their arrival to the scene. Patient also reports lower back pain. She does not remember what happened. EMS states patient denied any neck pain. Patient states she has \"a lot\" of medical problems and is on \"a lot of medications.\" Denies taking any blood thinners. EMS notes patient wears 4 L oxygen. They also note she is on oxy. EMS states patient's home appears clean and well taken care of.   Per chart review patient does have a history of epilepsy, currently on Vimpat and Keppra.  History of significant fall risk.  Has required hospitalization and skilled nursing facility placement in the past.    Quality: Altered mental status  Duration: onset just prior to arrival  Severity: Severe  Associated sx: pain to back of head, dizziness, altered mental status, lower back pain    REVIEW OF SYSTEMS  As above, all other systems reviewed and are negative.   See HPI for further details.     PAST MEDICAL HISTORY   has a " past medical history of Anemia, Cardiomegaly, Cataract, Chronic obstructive pulmonary disease (HCC), Cirrhosis (HCC) (12/2011), CKD (chronic kidney disease) stage 3, GFR 30-59 ml/min (HCC), Diabetes (HCC), Emphysema of lung (HCC), Fracture of left foot, GERD (gastroesophageal reflux disease), H/O Clostridium difficile infection (02/17/2017), Heart burn, Hemorrhagic disorder (HCC), Hiatus hernia syndrome, High cholesterol, Hypertension, Hypothyroid, Indigestion, Jaundice (2009), Liver transplanted (HCC), Low back pain (02/17/2017), Memory difficulty, Mild aortic regurgitation and aortic valve sclerosis ( ), On home oxygen therapy, Platelet disorder (HCC), Pneumonia, Psychiatric disorder, Pulmonary hypertension (HCC), S/P cholecystectomy, Seizure (HCC) (05/29/2022), Shoulder pain, Small bowel obstruction (HCC), Splenomegaly, and VRE (vancomycin-resistant Enterococci).  SURGICAL HISTORY   has a past surgical history that includes anesth,nose,sinus surgery; makayla by laparoscopy (09/19/2009); exam under anesthesia (11/11/2009); hysterectomy, total abdominal; gastroscopy-endo (03/12/2010); bronchoscopy-endo (05/29/2012); bronchoscopy-endo (06/19/2012); sigmoidoscopy flex (09/26/2012); recovery (02/27/2013); bronchoscopy-endo (11/15/2013); recovery (01/21/2014); recovery (03/24/2014); hemorrhoidectomy (11/11/2009); gastroscopy (09/28/2012); carpal tunnel release; bone marrow aspiration (12/31/2012); bone marrow biopsy, ndl/trocar (12/31/2012); recovery (03/31/2014); other abdominal surgery (12/2011); bone marrow aspiration (03/16/2015); bone marrow biopsy, ndl/trocar (03/16/2015); lung biopsy open (11/2016); tonsillectomy; other abdominal surgery (12/2015); breast biopsy (Left, 02/21/2017); colonoscopy (N/A, 03/27/2017); gastroscopy (N/A, 03/27/2017); gastric bypass laparoscopic (2018); hernia repair; makayla by laparoscopy; mammoplasty reduction; panniculectomy (12/11/2017); other surgical procedure; turbinate reduction  (Bilateral, 10/04/2018); nasal reconstruction (Bilateral, 10/04/2018); ventral hernia repair robotic xi (N/A, 11/12/2018); craniotomy (Left, 08/04/2021); upper gi endoscopy,diagnosis (N/A, 02/03/2022); colonoscopy,diagnostic (N/A, 6/21/2022); upper gi endoscopy,diagnosis (N/A, 6/21/2022); and partial hip replacement (Right, 8/28/2022).  SOCIAL HISTORY  Social History     Tobacco Use    Smoking status: Never    Smokeless tobacco: Never    Tobacco comments:     avoid all tobacco products   Vaping Use    Vaping Use: Never used   Substance Use Topics    Alcohol use: Not Currently     Alcohol/week: 0.0 oz    Drug use: No      Social History     Substance and Sexual Activity   Drug Use No     FAMILY HISTORY  Family History   Problem Relation Age of Onset    Other Father         Unknown (dead 10 years)    Diabetes Father     Heart Disease Father     Hypertension Father     Hyperlipidemia Father     Stroke Father     Non-contributory Mother     Hyperlipidemia Mother     Alcohol/Drug Mother     Cancer Paternal Aunt     Alcohol/Drug Maternal Grandmother     Alcohol/Drug Maternal Grandfather      CURRENT MEDICATIONS  Home Medications    **Home medications have not yet been reviewed for this encounter**       ALLERGIES  Allergies   Allergen Reactions    Nsaids Unspecified     Can not take due to hx of liver transplant     Rhubarb Anaphylaxis    Other Drug Unspecified     Any medication that may interact with cyclosporine, tacrolimus, sirolimus, or prograf (due to hx of liver transplant)     Tylenol Unspecified     Liver transplant    Vancomycin Unspecified     Red man syndrome     PHYSICAL EXAM    VITAL SIGNS:   Vitals:    09/28/22 0415 09/28/22 0421 09/28/22 0500 09/28/22 0521   BP:  135/63 126/69    Pulse:  92 87 93   Resp:    17   Temp:       SpO2:  98% 90% 97%   Weight: 53.1 kg (117 lb)      Height:         Vitals: My interpretation: hypertensive, not tachycardic, afebrile, not hypoxic    Reinterpretation of vitals:  Improved    Cardiac Monitor Interpretation: The cardiac monitor revealed normal Sinus Rhythm as interpreted by me. The cardiac monitor was ordered secondary to the patient's history of altered mental status and to monitor for dysrhythmia and/or tachycardia.    PE:   Constitutional: Well developed, Well nourished, No acute distress, Non-toxic appearance.   HENT: Normocephalic, Large laceration to occipital region, Bilateral external ears normal, Oropharynx is clear mucous membranes are moist. No oral exudates or nasal discharge.   Eyes: Pupils 3 mm and reactive bilaterally, EOMI, Conjunctiva normal, No discharge.   Neck: Normal range of motion, No tenderness, Supple, No stridor. No meningismus.  Lymphatic: No lymphadenopathy noted.   Cardiovascular: Regular rate and rhythm without murmur rub or gallop.  Thorax & Lungs: Clear breath sounds bilaterally without wheezes, rhonchi or rales. Mild tenderness throughout the chest.   Abdomen: Soft non-tender non-distended. There is no rebound or guarding. No organomegaly is appreciated. Bowel sounds are normal.  Skin: Normal without rash.   Back: No CVA or spinal tenderness.   Extremities: Intact distal pulses, No edema, No tenderness, No cyanosis, No clubbing. Capillary refill is less than 2 seconds.  Musculoskeletal: Good range of motion in all major joints. Tenderness to pelvis bilaterally. No major deformities noted. Atraumatic upper extremities.  Neurologic: Alert & oriented x 2, Normal motor function, Normal sensory function, No focal deficits noted. Reflexes are normal. GCS 13.   Psychiatric: Affect normal, Judgment normal, Mood normal. There is no suicidal ideation or patient reported hallucinations.     DIAGNOSTIC STUDIES / PROCEDURES    LABS  Results for orders placed or performed during the hospital encounter of 09/28/22   TROPONIN   Result Value Ref Range    Troponin T 13 6 - 19 ng/L   URINALYSIS CULTURE, IF INDICATED    Specimen: Urine   Result Value Ref Range     Color Yellow     Character Clear     Specific Gravity 1.024 <1.035    Ph 5.0 5.0 - 8.0    Glucose Negative Negative mg/dL    Ketones Negative Negative mg/dL    Protein Negative Negative mg/dL    Bilirubin Negative Negative    Urobilinogen, Urine 0.2 Negative    Nitrite Negative Negative    Leukocyte Esterase Negative Negative    Occult Blood Negative Negative    Micro Urine Req see below    DIAGNOSTIC ALCOHOL   Result Value Ref Range    Diagnostic Alcohol 44.9 (H) <10.1 mg/dL   Comp Metabolic Panel   Result Value Ref Range    Sodium 142 135 - 145 mmol/L    Potassium 4.0 3.6 - 5.5 mmol/L    Chloride 103 96 - 112 mmol/L    Co2 26 20 - 33 mmol/L    Anion Gap 13.0 7.0 - 16.0    Glucose 121 (H) 65 - 99 mg/dL    Bun 11 8 - 22 mg/dL    Creatinine 0.73 0.50 - 1.40 mg/dL    Calcium 8.9 8.5 - 10.5 mg/dL    AST(SGOT) 20 12 - 45 U/L    ALT(SGPT) 13 2 - 50 U/L    Alkaline Phosphatase 104 (H) 30 - 99 U/L    Total Bilirubin 0.2 0.1 - 1.5 mg/dL    Albumin 3.4 3.2 - 4.9 g/dL    Total Protein 5.8 (L) 6.0 - 8.2 g/dL    Globulin 2.4 1.9 - 3.5 g/dL    A-G Ratio 1.4 g/dL   CBC WITHOUT DIFFERENTIAL   Result Value Ref Range    WBC 3.0 (L) 4.8 - 10.8 K/uL    RBC 3.51 (L) 4.20 - 5.40 M/uL    Hemoglobin 10.5 (L) 12.0 - 16.0 g/dL    Hematocrit 33.8 (L) 37.0 - 47.0 %    MCV 96.3 81.4 - 97.8 fL    MCH 29.9 27.0 - 33.0 pg    MCHC 31.1 (L) 33.6 - 35.0 g/dL    RDW 50.8 (H) 35.9 - 50.0 fL    Platelet Count 107 (L) 164 - 446 K/uL    MPV 9.3 9.0 - 12.9 fL   Prothrombin Time   Result Value Ref Range    PT 12.4 12.0 - 14.6 sec    INR 0.93 0.87 - 1.13   APTT   Result Value Ref Range    APTT 27.0 24.7 - 36.0 sec   COD - Adult (Type and Screen)   Result Value Ref Range    ABO Grouping Only B     Rh Grouping Only POS     Antibody Screen-Cod NEG    ESTIMATED GFR   Result Value Ref Range    GFR (CKD-EPI) 93 >60 mL/min/1.73 m 2   EKG (NOW)   Result Value Ref Range    Report       [facility] Emergency Dept.    Test Date:  2022-09-28  Pt  Name:    VICK LI             Department:   MRN:        [id number]                      Room:        13  Gender:     Female                       Technician: [id number]  :        1960                   Requested By:JOSH HERR  Order #:    [id number]                    Reading MD: Josh Herr    Measurements  Intervals                                Axis  Rate:       90                           P:          51  LA:         152                          QRS:        15  QRSD:       184                          T:          51  QT:         379  QTc:        464    Interpretive Statements  Sinus rhythm  Nonspecific intraventricular conduction delay  Artifact in lead(s) I,II,III,aVR,aVL,aVF,V1,V3,V4,V5,V6  Compared to ECG 2022 19:06:19  Intraventricular conduction delay now present  Sinus tachycardia no longer present  Electronically Signed On 2022 5:30:41 PDT by Josh Herr        *Note: Due to a large number of results and/or encounters for the requested time period, some results have not been displayed. A complete set of results can be found in Results Review.      All labs reviewed by me. Significant for troponin negative, alcohol slightly elevated, normal electrolytes, normal renal function, normal liver enzymes, normal bilirubin, mild leukopenia, mild anemia, PT/INR normal    RADIOLOGY  CT-TSPINE W/O PLUS RECONS   Final Result         1.  No acute traumatic bony injury of the thoracic spine.      CT-LSPINE W/O PLUS RECONS   Final Result         1.  No acute traumatic bony injury of the lumbar spine.   2.  Atherosclerosis      CT-HEAD W/O   Final Result         1.  5.5 mm right mixed density right holohemispheric subdural hematoma   2.  2.2 mm left holohemispheric subdural hematoma.   3.  Right sylvian subarachnoid hemorrhages.   4.  Nonspecific white matter changes, commonly associated with small vessel ischemic disease.  Associated mild cerebral atrophy is noted.      Based  solely on CT findings, the brain injury guideline category is mBIG 3.      EDH   IVH   Displaced skull fx   SDH > 8mm   IPH > 8mm or multiple   SAH bi-hemispheric or > 3mm      The original BIG retrospective analysis found radiographic progression in 0% of BIG 1 patients and 2.6% BIG 2.      These findings were discussed with the patient's clinician, Jr Tamez, on 9/28/2022 5:01 AM.      CT-CSPINE WITHOUT PLUS RECONS   Final Result         1.  Multilevel degenerative changes of the cervical spine limit diagnostic sensitivity of this examination, otherwise no acute traumatic bony injury of the cervical spine is apparent.   2.  Atherosclerosis      CT-CHEST,ABDOMEN,PELVIS WITH   Final Result         1.  No significant abnormality in thorax, abdomen and pelvis CT scan.   2.  Atherosclerosis   3.  Pulmonary nodule, see nodule follow-up recommendations below.      Fleischner Society pulmonary nodule recommendations:      Low Risk: No routine follow-up      High Risk: Optional CT at 12 months      Comments: Nodules less than 6 mm do not require routine follow-up, but certain patients at high risk with suspicious nodule morphology, upper lobe location, or both may warrant 12-month follow-up.      Low Risk - Minimal or absent history of smoking and of other known risk factors.      High Risk - History of smoking or of other known risk factors.      Note: These recommendations do not apply to lung cancer screening, patients with immunosuppression, or patients with known primary cancer.      Fleischner Society 2017 Guidelines for Management of Incidentally Detected Pulmonary Nodules in Adults      DX-CHEST-LIMITED (1 VIEW)   Final Result         1.  No acute cardiopulmonary disease.   2.  Atherosclerosis      DX-PELVIS-1 OR 2 VIEWS   Final Result         1.  No acute traumatic bony injury.      MR-LUMBAR SPINE-W/O    (Results Pending)     The radiologist's interpretation of all radiological studies have been  reviewed by me.    COURSE & MEDICAL DECISION MAKING  Nursing notes, VS, PMSFHx, labs, imaging, EKG reviewed in chart.    Heart Score: Low     MDM: 3:58 AM Roxana Cuba is a 62 y.o. female who presented with evaluation after a fall in the bathroom with scalp laceration and altered mental status.  Patient arrives altered with a GCS of 13 and was upgraded to a trauma green alert considering her head trauma and altered mental status.  I saw her in the trauma bay her vital signs were within normal limits and she is arousable but does seem acutely altered.  Large laceration to the occiput which is hemostatic with pressure dressing.  Pupils equal and reactive.  She states she has pain in her back but I cannot identify any signs of trauma.  Reports pain in her hips but no signs of obvious deformity.  She states she is chronically on oxycodone.  Pupils are equal and reactive.  After chart review she does have a history of seizure disorder on Vimpat and Keppra and saw her neurologist yesterday and was considered a significant fall risk.  Question of whether she syncopized, had a mechanical fall or possibly seized.  Chest x-ray and pelvis x-ray in the trauma bay are fairly unremarkable and vital signs remain normal, IV placed and patient taken for trauma pan scan.  CT imaging concerning for bilateral subdural hematomas, subarachnoid hemorrhage which I discussed with radiology.  Discussed with neurosurgery team and they are aware and will follow on the patient as a consultant.  Trauma surgery will admit the patient for continued monitoring.  Patient updated and is amenable.  Her laceration was closed with staples, see procedure note.  She is critically ill and will require ICU placement.  Labs are fairly unremarkable other than a light slightly elevated alcohol, negative troponin, normal CMP, normal CBC other than mild leukopenia and mild anemia which appears to be baseline upon chart review, PT/INR normal.  She is  chronically opiate dependent.  Of note patient did have significant urinary retention with 750 cc noted on bladder scan after CT scan came back with bladder distention.  Holbrook catheter was placed.  Unclear whether she has a history of urinary retention or not, MRI of lumbar spine ordered for evaluation.  Patient's mentation improved to GCS of 15 at time of admission.     Laceration Repair Procedure Note  Indication: Laceration  Procedure: The patient was placed in the appropriate position and anesthesia around the laceration was obtained by infiltration using none. The area was then irrigated with normal saline and the skin adjacent to the wound was cleansed with Betadine. The laceration was closed with staples. A total of 4 sutures were placed. The wound area was then dressed with a sterile dressing.    Total repaired wound length: 3 cm.     CRITICAL CARE TIME 37 minutes: Trauma green activation, altered mental status, syncope versus seizure versus mechanical fall, altered mental status, bilateral subdural hemorrhage, subarachnoid hemorrhage, multitrauma  There was a very real possibility of deterioration of the patient's condition.  This patient required the highest level of care.  I provided critical care services which included: review of the medical record, treatment orders, ordering and reviewing test results, frequent reevaluation of the patient's condition and response to treatment, as well as discussing the case with appropriate personnel and various consultants. The critical care time associated with the care of this patient is exclusive of any procedures or specific interventions.    FINAL IMPRESSION  1. Bilateral subdural hematomas (HCC) Acute   2. Subarachnoid hemorrhage (HCC) Acute   3. Scalp laceration, initial encounter Acute   4. Syncope, unspecified syncope type Acute   5. Urinary retention Acute   6. Alcohol abuse Acute   7. Opioid dependence with opioid-induced disorder (HCC) Acute      Gama BRODY  VALENTINA Cramer (Scribe), am scribing for, and in the presence of, Jr Tamez.    Electronically signed by: Gama Cramer (Scribe), 9/28/2022    I, Jr Tamez personally performed the services described in this documentation, as scribed by Gama Cramer in my presence, and it is both accurate and complete.    The note accurately reflects work and decisions made by me.  Jr Tamez  9/28/2022  5:22 AM

## 2022-09-28 NOTE — CONSULTS
Hospital Medicine Consultation    Date of Service  9/28/2022    Referring Physician  Nir Moreno M.D.    Consulting Physician  Darinel Ruiz M.D.    Reason for Consultation  Post ICU care    History of Presenting Illness  62 y.o. female who presented 9/28/2022 with past medical history of liver transplant on immunosuppressive therapy, pulmonary hypertension, chronic hypoxic respiratory failure on 4 L of oxygen, seizures, history of repeated falls, subdural hematoma on 8/2022 who presents to the hospital after a ground-level fall.  Patient states that time she was walking down the hallway where she felt lightheaded and dizzy.  CT scan of the head found a 5.5 mm right holohemispheric subdural hematoma, 2.2 mm left hemispheric subdural hematoma.  Right sylvian subarachnoid hemorrhages.  CT C-spine was negative.  CT chest abdomen pelvis was negative.  Trauma team evaluated patient and determined that she should have a nonoperative management and repeat CT scan in 6 hours.  She did have a scalp laceration stapled in the emergency room.  Neurosurgery did evaluate the patient and stated that she did have a stable bleed.  Repeat CT scan of the head found a mildly increased size of the extra-axial hemorrhage at the right frontal lobe.  The bilateral lateral holohemispheric hematomas are similar in size.      The patient states that she has had repeated falls over the past 1 year.  She has a history of pulmonary hypertension and is on Cialis.  According to the patient to her pulmonologist at [facility] tried lowering her medications.  Patient denies any nausea, vomiting, diarrhea, fever, chills.  She uses a walker at home as of lately.    Review of Systems  Review of Systems   Constitutional:  Negative for chills, diaphoresis, fever and malaise/fatigue.   HENT:  Negative for congestion, ear discharge, ear pain, hearing loss, nosebleeds, sinus pain, sore throat and tinnitus.    Eyes:  Negative for blurred vision, double  vision, photophobia and pain.   Respiratory:  Negative for cough, hemoptysis, sputum production, shortness of breath, wheezing and stridor.    Cardiovascular:  Negative for chest pain, palpitations, orthopnea, claudication, leg swelling and PND.   Gastrointestinal:  Negative for abdominal pain, blood in stool, constipation, diarrhea, heartburn, melena, nausea and vomiting.   Genitourinary:  Negative for dysuria, flank pain, frequency, hematuria and urgency.   Musculoskeletal:  Negative for back pain, falls, joint pain, myalgias and neck pain.   Skin:  Negative for itching and rash.   Neurological:  Positive for dizziness. Negative for tingling, tremors, weakness and headaches.   Endo/Heme/Allergies:  Negative for environmental allergies and polydipsia. Does not bruise/bleed easily.   Psychiatric/Behavioral:  Negative for depression, hallucinations, substance abuse and suicidal ideas.      Past Medical History   has a past medical history of Anemia, Cardiomegaly, Cataract, Chronic obstructive pulmonary disease (HCC), Cirrhosis ([facility]) (12/2011), CKD (chronic kidney disease) stage 3, GFR 30-59 ml/min ([facility]), Diabetes ([facility]), Emphysema of lung ([facility]), Fracture of left foot, GERD (gastroesophageal reflux disease), H/O Clostridium difficile infection (02/17/2017), Heart burn, Hemorrhagic disorder ([facility]), Hiatus hernia syndrome, High cholesterol, Hypertension, Hypothyroid, Indigestion, Jaundice (2009), Liver transplanted ([facility]), Low back pain (02/17/2017), Memory difficulty, Mild aortic regurgitation and aortic valve sclerosis ( ), On home oxygen therapy, Platelet disorder ([facility]), Pneumonia, Psychiatric disorder, Pulmonary hypertension ([facility]), S/P cholecystectomy, Seizure ([facility]) (05/29/2022), Shoulder pain, Small bowel obstruction ([facility]), Splenomegaly, and VRE (vancomycin-resistant Enterococci).    Surgical History   has a past surgical history that includes pr anesth,nose,sinus surgery; makayla by laparoscopy (09/19/2009); exam under  anesthesia (11/11/2009); hysterectomy, total abdominal; gastroscopy-endo (03/12/2010); bronchoscopy-endo (05/29/2012); bronchoscopy-endo (06/19/2012); sigmoidoscopy flex (09/26/2012); recovery (02/27/2013); bronchoscopy-endo (11/15/2013); recovery (01/21/2014); recovery (03/24/2014); hemorrhoidectomy (11/11/2009); gastroscopy (09/28/2012); carpal tunnel release; bone marrow aspiration (12/31/2012); bone marrow biopsy, ndl/trocar (12/31/2012); recovery (03/31/2014); other abdominal surgery (12/2011); bone marrow aspiration (03/16/2015); bone marrow biopsy, ndl/trocar (03/16/2015); lung biopsy open (11/2016); tonsillectomy; other abdominal surgery (12/2015); breast biopsy (Left, 02/21/2017); colonoscopy (N/A, 03/27/2017); gastroscopy (N/A, 03/27/2017); gastric bypass laparoscopic (2018); hernia repair; makayla by laparoscopy; mammoplasty reduction; panniculectomy (12/11/2017); other surgical procedure; turbinate reduction (Bilateral, 10/04/2018); nasal reconstruction (Bilateral, 10/04/2018); ventral hernia repair robotic xi (N/A, 11/12/2018); craniotomy (Left, 08/04/2021); pr upper gi endoscopy,diagnosis (N/A, 02/03/2022); pr colonoscopy,diagnostic (N/A, 6/21/2022); pr upper gi endoscopy,diagnosis (N/A, 6/21/2022); and pr partial hip replacement (Right, 8/28/2022).    Family History  family history includes Alcohol/Drug in her maternal grandfather, maternal grandmother, and mother; Cancer in her paternal aunt; Diabetes in her father; Heart Disease in her father; Hyperlipidemia in her father and mother; Hypertension in her father; Non-contributory in her mother; Other in her father; Stroke in her father.    Social History   reports that she has never smoked. She has never used smokeless tobacco. She reports that she does not currently use alcohol. She reports that she does not use drugs.    Medications  Prior to Admission Medications   Prescriptions Last Dose Informant Patient Reported? Taking?   albuterol (PROAIR HFA)  108 (90 Base) MCG/ACT Aero Soln inhalation aerosol UNKN at UNKN Family Member No No   Sig: Inhale 2 Puffs every four hours as needed for Shortness of Breath (wheezing).   ambrisentan (LETAIRIS) 10 MG tablet UNKN at UNKN Family Member No No   Sig: TAKE 1 TABLET BY MOUTH EVERY DAY   fludrocortisone (FLORINEF) 0.1 MG Tab UNKN at UNKN Family Member Yes No   Sig: Take 0.1 mg by mouth every day.   lacosamide (VIMPAT) 100 MG Tab tablet UNKN at UNKN Family Member No No   Sig: Take 1 Tablet by mouth 2 times a day for 180 days.   levetiracetam (KEPPRA) 750 MG tablet UNKN at UNKN Family Member No No   Sig: Take 2 Tablets by mouth 2 times a day.   metoclopramide (REGLAN) 10 MG Tab UNKN at UNKN Family Member Yes No   Sig: Take 10 mg by mouth 2 times a day.   mycophenolate (CELLCEPT) 250 MG Cap UNKN at UNKN Family Member Yes No   Sig: Take 250 mg by mouth 2 times a day. Indications: Liver Transplant Recipient   omeprazole (PRILOSEC) 40 MG delayed-release capsule UNKN at UNKN Family Member No No   Sig: Take 1 Capsule by mouth every day.   ondansetron (ZOFRAN ODT) 4 MG TABLET DISPERSIBLE UNKN at UNKN Family Member No No   Sig: DISSOLVE 1 TAB ON TONGUE EVERY 8 HOURS AS NEEDED FOR NAUSEA   potassium chloride SA (K-DUR) 10 MEQ Tab CR UNKN at UNKN Family Member No No   Sig: Take 1 Tablet by mouth every day.   pravastatin (PRAVACHOL) 20 MG Tab UNKN at UNKN Family Member No No   Sig: Take 1 Tablet by mouth every day.   sertraline (ZOLOFT) 100 MG Tab UNKN at UNKN Family Member No No   Sig: Take 1.5 Tablets by mouth every day.   tacrolimus (PROGRAF) 1 MG Cap UNKN at UNKN Family Member No No   Sig: Take 2 Capsules by mouth 2 times a day.   tadalafil (CIALIS) 20 MG tablet UNKN at UNKN Family Member No No   Sig: Take 1 Tablet by mouth every day.      Facility-Administered Medications: None       Allergies  Allergies   Allergen Reactions    Nsaids Unspecified     Can not take due to hx of liver transplant     Rhubarb Anaphylaxis    Other Drug  Unspecified     Any medication that may interact with cyclosporine, tacrolimus, sirolimus, or prograf (due to hx of liver transplant)     Tylenol Unspecified     Liver transplant    Vancomycin Unspecified     Red man syndrome       Physical Exam  Temp:  [36.9 °C (98.4 °F)-37.5 °C (99.5 °F)] 37.2 °C (98.9 °F)  Pulse:  [80-99] 83  Resp:  [14-40] 22  BP: ()/(49-75) 99/59  SpO2:  [90 %-100 %] 100 %    Physical Exam  Vitals and nursing note reviewed.   Constitutional:       General: She is not in acute distress.     Appearance: Normal appearance. She is not ill-appearing, toxic-appearing or diaphoretic.   HENT:      Head: Normocephalic and atraumatic.      Nose: No congestion or rhinorrhea.      Mouth/Throat:      Pharynx: No oropharyngeal exudate or posterior oropharyngeal erythema.   Eyes:      General: No scleral icterus.  Neck:      Vascular: No carotid bruit or JVD.   Cardiovascular:      Rate and Rhythm: Normal rate and regular rhythm.      Pulses: Normal pulses.      Heart sounds: Normal heart sounds. No murmur heard.    No friction rub. No gallop.   Pulmonary:      Effort: Pulmonary effort is normal. No respiratory distress.      Breath sounds: No stridor. No wheezing, rhonchi or rales.   Abdominal:      General: Abdomen is flat. There is no distension.      Palpations: There is no mass.      Tenderness: There is no abdominal tenderness. There is no left CVA tenderness, guarding or rebound.      Hernia: No hernia is present.   Musculoskeletal:         General: No swelling. Normal range of motion.      Cervical back: No rigidity. No muscular tenderness.      Right lower leg: No edema.      Left lower leg: No edema.   Lymphadenopathy:      Cervical: No cervical adenopathy.   Skin:     General: Skin is warm and dry.      Capillary Refill: Capillary refill takes more than 3 seconds.      Coloration: Skin is not jaundiced or pale.      Findings: No bruising or erythema.   Neurological:      Mental Status: She is  alert.       Fluids  Date 09/28/22 0700 - 09/29/22 0659   Shift 7193-5536 4293-5551 5860-0019 24 Hour Total   INTAKE   P.O. 560   560   Shift Total 560   560   OUTPUT   Urine 395   395   Shift Total 395   395   Weight (kg) 56.5 56.5 56.5 56.5       Laboratory  Recent Labs     09/28/22  0356   WBC 3.0*   RBC 3.51*   HEMOGLOBIN 10.5*   HEMATOCRIT 33.8*   MCV 96.3   MCH 29.9   MCHC 31.1*   RDW 50.8*   PLATELETCT 107*   MPV 9.3     Recent Labs     09/28/22  0356   SODIUM 142   POTASSIUM 4.0   CHLORIDE 103   CO2 26   GLUCOSE 121*   BUN 11   CREATININE 0.73   CALCIUM 8.9     Recent Labs     09/28/22  0356   APTT 27.0   INR 0.93                 Imaging  CT-HEAD W/O   Final Result      1.  Mildly increased size of the extra axial hemorrhage at the right frontal lobe.   2.  Otherwise similar size of the bilateral lateral holohemispheric extra-axial hematomas. Regional sulcal effacement on the right has mildly worsened.   3.  Layering subarachnoid hemorrhage along the falx and right tentorium likely related to redistribution.   4.  Minimally increased right sylvian fissure subarachnoid hemorrhage.   5.  Basal cisterns are patent.      CT-TSPINE W/O PLUS RECONS   Final Result         1.  No acute traumatic bony injury of the thoracic spine.      CT-LSPINE W/O PLUS RECONS   Final Result         1.  No acute traumatic bony injury of the lumbar spine.   2.  Atherosclerosis      CT-HEAD W/O   Final Result         1.  5.5 mm right mixed density right holohemispheric subdural hematoma   2.  2.2 mm left holohemispheric subdural hematoma.   3.  Right sylvian subarachnoid hemorrhages.   4.  Nonspecific white matter changes, commonly associated with small vessel ischemic disease.  Associated mild cerebral atrophy is noted.      Based solely on CT findings, the brain injury guideline category is mBIG 3.      EDH   IVH   Displaced skull fx   SDH > 8mm   IPH > 8mm or multiple   SAH bi-hemispheric or > 3mm      The original BIG  retrospective analysis found radiographic progression in 0% of BIG 1 patients and 2.6% BIG 2.      These findings were discussed with the patient's clinician, Jr Tamez, on 9/28/2022 5:01 AM.      CT-CSPINE WITHOUT PLUS RECONS   Final Result         1.  Multilevel degenerative changes of the cervical spine limit diagnostic sensitivity of this examination, otherwise no acute traumatic bony injury of the cervical spine is apparent.   2.  Atherosclerosis      CT-CHEST,ABDOMEN,PELVIS WITH   Final Result         1.  No significant abnormality in thorax, abdomen and pelvis CT scan.   2.  Atherosclerosis   3.  Pulmonary nodule, see nodule follow-up recommendations below.      Fleischner Society pulmonary nodule recommendations:      Low Risk: No routine follow-up      High Risk: Optional CT at 12 months      Comments: Nodules less than 6 mm do not require routine follow-up, but certain patients at high risk with suspicious nodule morphology, upper lobe location, or both may warrant 12-month follow-up.      Low Risk - Minimal or absent history of smoking and of other known risk factors.      High Risk - History of smoking or of other known risk factors.      Note: These recommendations do not apply to lung cancer screening, patients with immunosuppression, or patients with known primary cancer.      Fleischner Society 2017 Guidelines for Management of Incidentally Detected Pulmonary Nodules in Adults      DX-CHEST-LIMITED (1 VIEW)   Final Result         1.  No acute cardiopulmonary disease.   2.  Atherosclerosis      DX-PELVIS-1 OR 2 VIEWS   Final Result         1.  No acute traumatic bony injury.      MR-LUMBAR SPINE-W/O    (Results Pending)       Assessment/Plan  * Subdural hematoma, post-traumatic (HCC)- (present on admission)  Assessment & Plan  Last CT scan did show mild increase in right frontal hematoma but the lateral ones are stable.  Subarachnoid hemorrhage is stable.  Neurochecks every 4 hours  Blood  pressure management  Sodium management  Avoid anticoagulation  PT OT eval    Frequent falls- (present on admission)  Assessment & Plan  Repeated falls at home with dizziness  Possibly due to her underlying pulmonary hypertension and blood pressure management  PT OT eval    Pulmonary hypertension (HCC)- (present on admission)  Assessment & Plan  Last echo found normal RV function  Monitor vital signs on Cialis and letairis apparently these medications helped return from function back to normal  Need cardiology involvement in order to decrease her home medications if they are causing her symptoms    Low back pain- (present on admission)  Assessment & Plan  MRI of the L-spine is pending  Pain control  PT OT eval    Pulmonary nodule- (present on admission)  Assessment & Plan  Outpatient follow-up    History of seizures- (present on admission)  Assessment & Plan  Continue Keppra  Seizure precautions    Scalp laceration, initial encounter- (present on admission)  Assessment & Plan  Staples will need to be removed 7 days from admission    Syncope- (present on admission)  Assessment & Plan  Monitor on telemetry  Monitor blood pressure while in the hospital.   Possibly get cardiology involved if she needs her pulmonary hypertension medications adjusted.  She follows Dr. Steiner as an outpatient    Thrombocytopenia (HCC)- (present on admission)  Assessment & Plan  Keep platelets above 100,000    Chronic respiratory failure (HCC)- (present on admission)  Assessment & Plan  On 4 L of O2 which is her baseline    Status post liver transplant Dr. [place] ([facility])- (present on admission)  Assessment & Plan  Continue home mycophenolate and tacrolimus

## 2022-09-28 NOTE — ASSESSMENT & PLAN NOTE
Recurrent falls with ICH  Recent total hip arthroplasty  Reviewed risks of recurrent falls and recommended SNF referral based on PT OT evaluations

## 2022-09-28 NOTE — ASSESSMENT & PLAN NOTE
MRI negative for fractures     MRI:  Mild lumbar spine degenerative changes without significant spinal canal stenosis.     There is moderate left foraminal narrowing at L5-S1 with impingement upon the exiting left L5 nerve    PT OT and pain management

## 2022-09-28 NOTE — ED NOTES
BIB [facility], [facility]F, dizziness with fall. - thinners. Denies tripping. Per , pt awake but altered. 100cc blood loss. For EMS, GCS 15. Now GCS 13.

## 2022-09-28 NOTE — CONSULTS
DATE OF SERVICE:  09/28/2022     NEUROSURGICAL CONSULTATION     I was called to 5:30 a.m. and arrived at 8:50 a.m.     HISTORY OF PRESENT ILLNESS:  The patient is a pleasant and confused   62-year-old woman whom I met about a month ago when she was admitted to the   [facility] after a fall.  She had a mechanical fall a month ago and then she had a   mechanical fall again today.  She hit her head.  She has headaches, but no   nausea, vomiting.  No weakness, numbness or tingling.     PAST MEDICAL HISTORY:  Anemia, shortness of breath, cardiomegaly, bronchitis,   cataracts, chronic fatigue, COPD, cirrhosis, chronic kidney disease, diabetes,   emphysema, GERD, heartburn, hiatal hernia, high cholesterol, hypertension,   hypothyroidism, liver transplant, low back pain, memory difficulty, aortic   regurgitation, aortic valve stenosis, psychiatric disorder, pulmonary   hypertension, seizure disorder, small-bowel obstruction.     PAST SURGICAL HISTORY:  Laparoscopic cholecystectomy, abdominal hysterectomy,   hemorrhoidectomy, gastroscopy carpal tunnel release, bone marrow aspiration,   lung biopsy, tonsillectomy, breast biopsy, colonoscopy, gastric bypass, hernia   repair, mammoplasty reduction, panniculectomy, turbinate reduction, nasal   reconstruction, ventral hernia, craniotomy in 08/2021 by Dr. Arnold, liver   transplant.     MEDICATIONS:  Albuterol, Letairis, vitamin C, Klonopin, Florinef, Lasix,   Vimpat, Keppra, Synthroid, mag oxide, Reglan, MS Contin, CellCept, Prilosec,   Zofran, oxycodone, K-Dur, Pravachol, Phenergan, Zoloft, Prograf, Cialis.     PHYSICAL EXAMINATION:  NEUROLOGIC:  A and O x2, confused.  Pupils equal, round, reactive to light.    Conjugate gaze.  Face symmetric. Motor is grossly 5/5 strength in all muscle   groups in the upper and lower extremities.  Sensory is grossly intact to light   touch.  She is somewhat slow to respond.     ALLERGIES:  NSAIDS, TYLENOL, VANCOMYCIN.     LABORATORY VALUES:  CBC  significant for white count of 3.0, hemoglobin 10.5,   platelets 107.  Basic metabolic panel within normal limits except for glucose   of 121.  INR 0.93, PTT 27.0.     IMAGING:  CT scan of the head noncontrast shows a right-sided mixed density 5   mm subdural, which is grossly stable to her scan from a month ago except this   last acute.  She has a 2 mm left-sided holohemispheric subdural.  She has a   little bit of subarachnoid hemorrhage in the sylvian fissure on the right   side.     PLAN:  The patient had stable exam and relatively stable scan. We will repeat   it given the new trauma.  If her scan is stable, she can safely be transferred   to the floor and can a diet. We will repeat another scan if this one today is   stable, we will repeat another one in about 2 weeks.  We will follow.        ______________________________  MD ERIC Mcginnis/CHANTELLE    DD:  09/28/2022 09:04  DT:  09/28/2022 09:33    Job#:  [id number]

## 2022-09-28 NOTE — ASSESSMENT & PLAN NOTE
Reports feeling dizzy prior to fall. History of dizziness per chart review.  Syncope vs opioid use/acohol use.  EKG with sinus rhythm.  Most recent ECHO 3/2022 with normal left ventricular size, thickness, and systolic function and EF 60%.

## 2022-09-28 NOTE — PROGRESS NOTES
Trauma / Surgical Daily Progress Note    Date of Service  9/28/2022    Chief Complaint  62 y.o. female admitted 9/28/2022 with traumatic subdural hematoma and frequent falls.     Interval Events  At baseline mentation.   Okay to transfer to [facility] per neurosurgery pending repeat head CT and L-spine MR. Holbrook placed for urinary rentention.   No ETOH withdrawal symptoms.     - MR L-spine pending.  - Medicine consult pending.    Review of Systems  Review of Systems   Constitutional:  Positive for malaise/fatigue. Negative for chills and fever.   Respiratory:  Negative for cough and shortness of breath.    Cardiovascular:  Negative for chest pain and palpitations.   Gastrointestinal:  Negative for abdominal pain, nausea and vomiting.        BM PTA   Genitourinary:         Retention    Musculoskeletal:  Positive for back pain, falls and myalgias. Negative for neck pain.        Right hip pain   Neurological:  Positive for weakness and headaches. Negative for dizziness, tingling, sensory change and focal weakness.      Vital Signs  Temp:  [36.9 °C (98.4 °F)-37.5 °C (99.5 °F)] 36.9 °C (98.4 °F)  Pulse:  [80-99] 82  Resp:  [17-40] 28  BP: ()/(53-75) 89/53  SpO2:  [90 %-99 %] 94 %    Physical Exam  Physical Exam  Vitals and nursing note reviewed.   Constitutional:       General: She is not in acute distress.     Appearance: She is underweight.      Interventions: Nasal cannula in place.   HENT:      Head: Normocephalic and atraumatic.      Mouth/Throat:      Mouth: Mucous membranes are dry.   Eyes:      Extraocular Movements: Extraocular movements intact.      Conjunctiva/sclera: Conjunctivae normal.      Pupils: Pupils are equal, round, and reactive to light.   Cardiovascular:      Rate and Rhythm: Normal rate and regular rhythm.   Pulmonary:      Effort: Pulmonary effort is normal. No respiratory distress.      Breath sounds: Normal breath sounds.   Abdominal:      General: Bowel sounds are normal. There is no  distension.      Palpations: Abdomen is soft.      Tenderness: There is no abdominal tenderness.   Musculoskeletal:      Cervical back: Normal range of motion and neck supple. No rigidity or tenderness.      Right lower leg: No edema.      Left lower leg: No edema.      Comments: Moves all extremities   Old surgical incision right hip   Skin:     Coloration: Skin is pale.      Comments: Scattered bruising   Neurological:      Mental Status: She is easily aroused. Mental status is at baseline.      GCS: GCS eye subscore is 4. GCS verbal subscore is 5. GCS motor subscore is 6.       Laboratory  Recent Results (from the past 24 hour(s))   TROPONIN    Collection Time: 09/28/22  3:56 AM   Result Value Ref Range    Troponin T 13 6 - 19 ng/L   DIAGNOSTIC ALCOHOL    Collection Time: 09/28/22  3:56 AM   Result Value Ref Range    Diagnostic Alcohol 44.9 (H) <10.1 mg/dL   Comp Metabolic Panel    Collection Time: 09/28/22  3:56 AM   Result Value Ref Range    Sodium 142 135 - 145 mmol/L    Potassium 4.0 3.6 - 5.5 mmol/L    Chloride 103 96 - 112 mmol/L    Co2 26 20 - 33 mmol/L    Anion Gap 13.0 7.0 - 16.0    Glucose 121 (H) 65 - 99 mg/dL    Bun 11 8 - 22 mg/dL    Creatinine 0.73 0.50 - 1.40 mg/dL    Calcium 8.9 8.5 - 10.5 mg/dL    AST(SGOT) 20 12 - 45 U/L    ALT(SGPT) 13 2 - 50 U/L    Alkaline Phosphatase 104 (H) 30 - 99 U/L    Total Bilirubin 0.2 0.1 - 1.5 mg/dL    Albumin 3.4 3.2 - 4.9 g/dL    Total Protein 5.8 (L) 6.0 - 8.2 g/dL    Globulin 2.4 1.9 - 3.5 g/dL    A-G Ratio 1.4 g/dL   CBC WITHOUT DIFFERENTIAL    Collection Time: 09/28/22  3:56 AM   Result Value Ref Range    WBC 3.0 (L) 4.8 - 10.8 K/uL    RBC 3.51 (L) 4.20 - 5.40 M/uL    Hemoglobin 10.5 (L) 12.0 - 16.0 g/dL    Hematocrit 33.8 (L) 37.0 - 47.0 %    MCV 96.3 81.4 - 97.8 fL    MCH 29.9 27.0 - 33.0 pg    MCHC 31.1 (L) 33.6 - 35.0 g/dL    RDW 50.8 (H) 35.9 - 50.0 fL    Platelet Count 107 (L) 164 - 446 K/uL    MPV 9.3 9.0 - 12.9 fL   Prothrombin Time    Collection Time:  22  3:56 AM   Result Value Ref Range    PT 12.4 12.0 - 14.6 sec    INR 0.93 0.87 - 1.13   APTT    Collection Time: 22  3:56 AM   Result Value Ref Range    APTT 27.0 24.7 - 36.0 sec   COD - Adult (Type and Screen)    Collection Time: 22  3:56 AM   Result Value Ref Range    ABO Grouping Only B     Rh Grouping Only POS     Antibody Screen-Cod NEG    ESTIMATED GFR    Collection Time: 22  3:56 AM   Result Value Ref Range    GFR (CKD-EPI) 93 >60 mL/min/1.73 m 2   URINALYSIS CULTURE, IF INDICATED    Collection Time: 22  5:13 AM    Specimen: Urine   Result Value Ref Range    Color Yellow     Character Clear     Specific Gravity 1.024 <1.035    Ph 5.0 5.0 - 8.0    Glucose Negative Negative mg/dL    Ketones Negative Negative mg/dL    Protein Negative Negative mg/dL    Bilirubin Negative Negative    Urobilinogen, Urine 0.2 Negative    Nitrite Negative Negative    Leukocyte Esterase Negative Negative    Occult Blood Negative Negative    Micro Urine Req see below    EKG (NOW)    Collection Time: 22  5:27 AM   Result Value Ref Range    Report       [facility] Emergency Dept.    Test Date:  2022  Pt Name:    VICK LI             Department: ER  MRN:        [id number]                      Room:       [facility]  Gender:     Female                       Technician: [id number]  :        1960                   Requested By:JOSH HERR  Order #:    [id number]                    Reading MD: Josh Herr    Measurements  Intervals                                Axis  Rate:       90                           P:          51  MD:         152                          QRS:        15  QRSD:       184                          T:          51  QT:         379  QTc:        464    Interpretive Statements  Sinus rhythm  Nonspecific intraventricular conduction delay  Artifact in lead(s) I,II,III,aVR,aVL,aVF,V1,V3,V4,V5,V6  Compared to ECG 2022  19:06:19  Intraventricular conduction delay now present  Sinus tachycardia no longer present  Electronically Signed On 9- 5:30:41 PDT by Jr Tamez      POCT glucose device results    Collection Time: 09/28/22  7:04 AM   Result Value Ref Range    POC Glucose, Blood 74 65 - 99 mg/dL       Fluids    Intake/Output Summary (Last 24 hours) at 9/28/2022 1150  Last data filed at 9/28/2022 0745  Gross per 24 hour   Intake 60 ml   Output 1275 ml   Net -1215 ml       Core Measures & Quality Metrics  Labs reviewed, Medications reviewed, Radiology images reviewed and EKG reviewed  Holbrook catheter: Urinary Tract Retention or Urinary Tract Obstruction      DVT Prophylaxis: Contraindicated - High bleeding risk  DVT prophylaxis - mechanical: SCDs  Ulcer prophylaxis: Yes      RAP Score Total: 6  CAGE Results: not completed Blood Alcohol>0.08: yes       Assessment complete date: 9/28/2022 (unable to complete secondary mentation)      Assessment/Plan  * Trauma- (present on admission)  Assessment & Plan  Ground level fall. Felt dizzy and fell.  Trauma Green Activation.  Nir Moreno MD. Trauma Surgery.    History of seizures- (present on admission)  Assessment & Plan  Chronic condition treated with Keppra and lacosamide.  Resumed maintenance medication on admission.     Subdural hematoma, post-traumatic (HCC)- (present on admission)  Assessment & Plan  5.5 mm right mixed density right holohemispheric subdural hematoma 2.2 mm left holohemispheric subdural hematoma. Right sylvian subarachnoid hemorrhages.  BIG 3.  Follow up head CT in 6 hrs.  Non-operative management.  Post traumatic pharmacologic seizure prophylaxis for 1 week.  Speech Language Pathology cognitive evaluation.  Maxwell Okeefe MD. Neurosurgeon. [facility].    Low back pain- (present on admission)  Assessment & Plan  Lumbar pain on assessment in trauma bay.  CT without acute injury.  On further assessment pt confused and unable to  participate with exam.  MRI pending.     Alcohol use- (present on admission)  Assessment & Plan  Admission blood alcohol level of 44.9.  Alcohol withdrawal surveillance.    Frequent falls- (present on admission)  Assessment & Plan   consult for evaluation of home safety.     Contraindication to deep vein thrombosis (DVT) prophylaxis- (present on admission)  Assessment & Plan  Prophylactic anticoagulation for thrombotic prevention initially contraindicated secondary to elevated bleeding risk.  9/30 Trauma surveillance venous duplex scanning ordered.    Syncope- (present on admission)  Assessment & Plan  Reports feeling dizzy prior to fall. History of dizziness per chart review.  Syncope vs opioid use/acohol use.  EKG with sinus rhythm.  Most recent ECHO 3/2022 with normal left ventricular size, thickness, and systolic function and EF 60%     Chronic, continuous use of opioids- (present on admission)  Assessment & Plan  Chronic pain treated with Oxycodone and MS Contin.  Definitive medication reconciliation pending.    Heart burn- (present on admission)  Assessment & Plan  Chronic condition treated with Omperazole.  Resumed maintenance medication on admission.      History of craniotomy- (present on admission)  Assessment & Plan  Left craniotomy in 2011.    Pulmonary nodule- (present on admission)  Assessment & Plan  5 mm right upper lobe pulmonary nodule.  Follow up with primary    Scalp laceration, initial encounter- (present on admission)  Assessment & Plan  Posterior scalp laceration.  Repaired with staples in ED.  Routine staples removal in 7 days.    Hypothyroidism- (present on admission)  Assessment & Plan  Chronic condition treated with Levothyroxine.  Medication not found on admission reconciliation.     Status post liver transplant Dr. [place] ([facility])- (present on admission)  Assessment & Plan  December 2011.  Status post liver transplant for end stage liver disease, followed by   [facility].    Mental status adequate for full examination?: No    Spine cleared (radiologically and/or clinically): Yes    All current laboratory studies/radiology exams reviewed: No, repeat head CT and L-spine MR pending.    Medications reconciliation has been reviewed: Yes    Completed Consultations:  Dr. Maxwell Okeefe, neurosurgery.     Pending Consultations:  Medicine consult    Newly Identified Diagnoses and Injuries:  None    Discussed patient condition with Hospitalist, RN, Patient, and trauma surgery. Dr. Andriy Owusu.

## 2022-09-28 NOTE — DISCHARGE PLANNING
Care Transition Team Assessment    In the case of an emergency, pt's legal NOK is spouse & DPOA, Otis Cuba P: [phone]      LMSW called spouse and obtained the following information used in this assessment. Verified accuracy of facesheet. Patient has an advance directive on file. Spouse is DPOA.     Patient lives with spouse in a single level house locally. Spouse assists patient with IADL's. Patient has been to local SNF's recently and is currently on active service with [facility]. Patient has home oxygen through [employer]. PCP and pharmacy are correct in EMR. No SA or mh dx.      Therapy's to evaluate.   Anticipate post-acute placement (IPR vs SNF)     Plan: Continue to assist with social/dc needs     Care Transition Team Assessment    Information Source  Orientation Level: Disoriented to place, Disoriented to situation  Information Given By: Spouse  Informant's Name: /DPOA  Who is responsible for making decisions for patient? : Legal next of kin  Name(s) of Primary Decision Maker: spoue    Readmission Evaluation  Is this a readmission?: No    Elopement Risk  Legal Hold: No  Ambulatory or Self Mobile in Wheelchair: Yes  Disoriented: No  Psychiatric Symptoms: None  History of Wandering: No  Elopement this Admit: No  Vocalizing Wanting to Leave: No  Displays Behaviors, Body Language Wanting to Leave: No-Not at Risk for Elopement    Interdisciplinary Discharge Planning  Primary Care Physician: Yes  Lives with - Patient's Self Care Capacity: Spouse    Discharge Preparedness  What is your plan after discharge?: Uncertain - pending medical team collaboration  What are your discharge supports?: Spouse  Prior Functional Level: Ambulatory, Independent with Activities of Daily Living, Independent with Medication Management    Functional Assesment  Prior Functional Level: Ambulatory, Independent with Activities of Daily Living, Independent with Medication Management    Finances  Financial Barriers to Discharge:  No  Prescription Coverage: Yes    Vision / Hearing Impairment  Right Eye Vision: Wears Glasses  Left Eye Vision: Wears Glasses    Advance Directive  Advance Directive?: DPOA for Health Care  Durable Power of  Name and Contact : spoue    Domestic Abuse  Have you ever been the victim of abuse or violence?: No    Psychological Assessment  History of Substance Abuse: None  History of Psychiatric Problems: No  Non-compliant with Treatment: No  Newly Diagnosed Illness: No    Discharge Risks or Barriers  Discharge risks or barriers?: Post-acute placement / services, Complex medical needs  Patient risk factors: Complex medical needs, Cognitive / sensory / physical deficit, Lack of outside supports, Readmission    Anticipated Discharge Information  Discharge Disposition: D/T to SNF with Medicare cert in anticipation of skilled care

## 2022-09-28 NOTE — ED NOTES
Pt remains restless and requires to be redirected multiple times  Pt continues to try to remove cables and does not know why she is doing it   Pt remains in direct view of this RN for obs

## 2022-09-28 NOTE — ED TRIAGE NOTES
Chief Complaint   Patient presents with    Trauma Green     BIB [facility], GLF, - thinners. Pt felt dizzy and passed out. GCS 15 on scene, 13 on arrival. Pt lost approx 50-100cc of blood from head lac.     TERESO NUNEZ from home for above. See trauma narrator for assessment. PIV placed in bay, Tdap ordered. Pt to CT, roomed to blue 13.

## 2022-09-28 NOTE — ASSESSMENT & PLAN NOTE
Lumbar pain on assessment in trauma bay.  CT without acute injury.  On further assessment pt confused and unable to participate with exam.  9/28 L-spine MR with no acute injuries.

## 2022-09-28 NOTE — ED NOTES
Med rec completed per Reconcile outside information  And dispense record from [facility]  Allergies reviewed historically

## 2022-09-28 NOTE — DISCHARGE PLANNING
Spoke with Dr. Owusu regarding his concerns for self-neglect regarding medical care. Multiple admissions, extensive medical hx and being non-compliant. MD concerned and asking for a report. She is 62 yrs/old and has been deemed disabled. She is a vulnerable adult. APS report was filed and submitted via email.

## 2022-09-28 NOTE — THERAPY
"Speech Language Pathology   Clinical Swallow Evaluation     Patient Name: Roxana Cuba  AGE:  62 y.o., SEX:  female  Medical Record #: [id number]  Today's Date: 2022     Precautions  Precautions: (P) Fall Risk, Swallow Precautions ( See Comments)    Assessment    HPI: 62 y.o. woman admitted on 22 for GLF    CMH: subdural hematoma, contra to DVT, scalp laceration, alcohol use, hx of seizures, chronic use of opioids, syncope, pulmonary nodule, low back pain.     PMH: L craniotomy (), COPD, CKD, GERD, dementia (per chart review). Patient previously seen by service on most recent admission for cognitive evaluation (baseline cognitive impairments) as well as dysphagia management (RG7/TN0).     Imagin22 CT Head:   1.  5.5 mm right mixed density right holohemispheric subdural hematoma  2.  2.2 mm left holohemispheric subdural hematoma.  3.  Right sylvian subarachnoid hemorrhages.  4.  Nonspecific white matter changes, commonly associated with small vessel ischemic disease.  Associated mild cerebral atrophy is noted.    22 CXR: No acute cardiopulmonary disease. Atherosclerosis        Level of Consciousness: Alert  Affect/Behavior: Cooperative, Tearful  Follows Directives: Yes - simple commands only  Orientation: Self - states \"I don't know\" and becomes tearful to additional questions  Hearing: Functional hearing  Vision: Functional vision      Prior Living Situation & Level of Function:  Patient unable to state. Previously admitted from acute skilled nursing facility per chart review (22).       Oral Mechanism Evaluation  Facial Symmetry: Equal  Labial Observations: WFL  Lingual Observations: Midline  Dentition: Fair  Comments:      Voice  Quality: WFL  Resonance: WFL  Intensity: Appropriate  Cough: WFL  Comments:      Current Method of Nutrition   NPO until cleared by speech pathology      Subjective  Patient became tearful and stating \"I don't remember\" when asked about diet prior to " "admission. Per chart review, patient recommended regular solids/thin liquids on previous admission. Patient reports she takes \"tons\" of medications at home with no difficulty using a liquid wash.        Assessment  Positioning: Lewis's (60-90 degrees)  Bolus Administration: SLP and Patient  Oxygen Requirements:  2 L Nasal Cannula  Factor(s) Affecting Performance: None    Swallowing Trials  Thin Liquid (TN0): WFL  Liquidised (LQ3): WFL  Regular (RG7): WFL    Comments: Adequate oral bolus acceptance/containment, complete AP transfer with no oral bolus residue, and timely mastication. No cough/throat clear appreciated with PO. Vocal quality remained stable throughout assessment. Single swallow completed per bolus. Patient able to affirm history of GERD upon questioning. Discussed reflux precautions. Provided education regarding general aspiration precautions as well as signs of aspiration. All questions addressed.            Clinical Impressions  Patient presents with clinically functional oropharyngeal swallow. Patient would benefit from single follow up with service for swallowing to ensure diet tolerance and implementation of reflux precautions.          Recommendations  1.  Regular solids/thin liquids  2.  Instrumentation: None indicated at this time  3.  Swallowing Instructions & Precautions:   Supervision: Distant supervision - check on patient 2-3 times per meal  Positioning: Fully upright and midline during oral intake, Remain upright for 30 minutes after oral intake  Medication: Whole with liquid  Strategies: Small bites/sips, Slow rate of intake  Oral Care: BID  4.  Following x1 for dysphagia management and for cognitive-linguistic evaluation      Plan    Recommend Speech Therapy 3 times per week until therapy goals are met for the following treatments:  Dysphagia Training and Cognitive-Linguistic Training.    Discharge Recommendations: (P)  (TBD pending cognitive-linguistic evaluation)       Objective       " "09/28/22 0927   Charge Group   SLP Oral Pharyngeal Evaluation Oral Pharyngeal Evaluation   Total Treatment Time   SLP Time Spent Yes   SLP Oral Pharyngeal Evaluation (Mins) 16   Initial Contact Note    Initial Contact Note  Order Received and Verified, Speech Therapy Evaluation in Progress with Full Report to Follow.   Precautions   Precautions Fall Risk;Swallow Precautions ( See Comments)   Vitals   O2 (LPM) 2   O2 Delivery Device Silicone Nasal Cannula   Pain 0 - 10 Group   Therapist Pain Assessment Post Activity Pain Same as Prior to Activity;0   Prior Level Of Function   Patient's Primary Language English   Dysphagia Rating   Nutritional Liquid Intake Rating Scale Non thickened beverages   Nutritional Food Intake Rating Scale Total oral diet with no restrictions   Patient / Family Goals   Patient / Family Goal #1 \"I don't remember\" crying, to most orientation questions   Short Term Goals   Short Term Goal # 1 Patient will consume regular solids/thin liquids without overt indicators of aspiration or decline in pulmonary status with implementation of reflux precautions   Education Group   Education Provided Dysphagia;Role of Speech Therapy   Dysphagia Patient Response Patient;Acceptance;Explanation;Verbal Demonstration   Problem List   Problem List Dysphagia   Anticipated Discharge Needs   Discharge Recommendations   (TBD pending cognitive-linguistic evaluation)   Interdisciplinary Plan of Care Collaboration   IDT Collaboration with  Nursing   Patient Position at End of Therapy In Bed;Bed Alarm On;Call Light within Reach   Collaboration Comments RN updated     "

## 2022-09-28 NOTE — ASSESSMENT & PLAN NOTE
Chronic condition treated with Keppra and lacosamide.  Resumed maintenance medication on admission.

## 2022-09-28 NOTE — WOUND TEAM
[facility]  Inpatient Services  Wound and Skin Care Brief Evaluation    Admission Date: 9/28/2022     Last order of IP CONSULT TO WOUND CARE was found on 9/28/2022 from Hospital Encounter on 9/28/2022     HPI, PMH, SH: Reviewed    Chief Complaint   Patient presents with    Trauma Green     BIB [facility], GLF, - thinners. Pt felt dizzy and passed out. GCS 15 on scene, 13 on arrival. Pt lost approx 50-100cc of blood from head lac.     Diagnosis: Trauma [T14.90XA]    Unit where seen by Wound Team: S126/00     Wound consult placed regarding posterior scalp, lips and tongue, right hip, right knee, and sacrum. Chart and images reviewed. PA at bedside prior to wound team assessment.    This RN in to assess patient. Pt pleasant and agreeable, however a poor historian.   She was admitted following a mechanical GLF.     Posterior scalp with staples in place and approximated. Left TANNER.     Did not note discoloration to tongue or lips. Unlikely to be pressure related. Left TANNER.     Right hip with intact, closed incision. Pt previously had a R femoral neck fracture for which she underwent repair for and had a Prevena to site. Left TANNER.     R knee and pretibial area with superificial scarring. Left TANNER.     Sacrum red, however blanching. No open wounds noted. Mepilex in place. Continue offloading measures and switch to barrier paste should patient become incontinent.    No pressure injuries or advanced wound care needs identified. Wound consult completed. No further follow up unless indicated and consulted.                          RSKIN:   CURRENTLY IN PLACE (X), APPLIED THIS VISIT (A), ORDERED (O):   Q shift Juan:  X  Q shift pressure point assessments:  X    Surface/Positioning   Pressure redistribution mattress            Low Airloss        X ICU  Bariatric foam      Bariatric BRICE     Waffle cushion        Waffle Overlay          Reposition q 2 hours      TAPs Turning system     Z Ted Pillow      Offloading/Redistribution   Sacral Mepilex (Silicone dressing)   X  Heel Mepilex (Silicone dressing)         Heel float boots (Prevalon boot)             Float Heels off Bed with Pillows           Respiratory   Silicone O2 tubing   X      Gray Foam Ear protectors     Cannula fixation Device (Tender )          High flow offloading Clip    Elastic head band offloading device      Anchorfast                                                         Trach with Optifoam split foam             Containment/Moisture Prevention     Rectal tube or BMS    Purwick/Condom Cath        Holbrook Catheter  X  Barrier wipes           Barrier paste       Antifungal tx      Interdry        Mobilization ADAM      Up to chair        Ambulate      PT/OT      Nutrition       Dietician        Diabetes Education      PO  X   TF     TPN     NPO   # days     Other

## 2022-09-28 NOTE — PROGRESS NOTES
Patient to S-126 on transport monitor with ACLS RN and critical care tech. Arrived to unit at 0640. Temp: 98.4. Weight 56.5 kg. HR: 79 BP: 115/99 O2: 97% on 2L   Personal belongings: No personal belongings arrived with patient      4 Eyes Skin Assessment Completed by Maribel RN and Cathy RN.    Head Redness and Incision, Stapled laceration to right side of head  Ears WDL  Nose WDL  Mouth Lip and tongue discoloration  Neck WDL  Breast/Chest WDL  Shoulder Blades WDL  Spine Redness and Blanching  (R) Arm/Elbow/Hand WDL  (L) Arm/Elbow/Hand WDL  Abdomen WDL  Groin WDL  Scrotum/Coccyx/Buttocks Redness, Blanching, and Discoloration  (R) Leg Scab, Abrasion, and Incision to hip  (L) Leg WDL  (R) Heel/Foot/Toe WDL  (L) Heel/Foot/Toe WDL          Devices In Places ECG, Tele Box, Blood Pressure Cuff, Pulse Ox, Holbrook, SCD's, and Nasal Cannula      Interventions In Place NC W/Ear Foams, Sacral Mepilex, Pillows, Q2 Turns, Heels Loaded W/Pillows, and Pressure Redistribution Mattress    Possible Skin Injury Yes    Pictures Uploaded Into Epic Yes  Wound Consult Placed Yes  RN Wound Prevention Protocol Ordered Yes

## 2022-09-28 NOTE — ASSESSMENT & PLAN NOTE
Last echo found normal RV function    On Cialis and Ambrisentan follows with [facility]  Appears euvolemic

## 2022-09-28 NOTE — H&P
CHIEF COMPLAINT: Ground-level fall.     HISTORY OF PRESENT ILLNESS: The patient is a 62 year-old woman who was injured in a fall. The patient suffered a mechanical ground-level fall. The patient had a brief loss of consciousness. The patient denies any chronic anticoagulation or antiplatelet medications. She complains of pain in her head.  She had a scalp laceration that was sutured by the emergency room department.  She currently denies neck pain, abdominal pain, numbness, tingling, or weakness.    TRIAGE CATEGORY: The patient was triaged as a Trauma Green activation. An expeditious primary and secondary survey with required adjuncts was conducted. See Trauma Narrator for full details.    PAST MEDICAL HISTORY:  has a past medical history of Anemia, Cardiomegaly, Cataract, Chronic obstructive pulmonary disease (HCC), Cirrhosis ([facility]) (12/2011), CKD (chronic kidney disease) stage 3, GFR 30-59 ml/min ([facility]), Diabetes ([facility]), Emphysema of lung ([facility]), Fracture of left foot, GERD (gastroesophageal reflux disease), H/O Clostridium difficile infection (02/17/2017), Heart burn, Hemorrhagic disorder ([facility]), Hiatus hernia syndrome, High cholesterol, Hypertension, Hypothyroid, Indigestion, Jaundice (2009), Liver transplanted ([facility]), Low back pain (02/17/2017), Memory difficulty, Mild aortic regurgitation and aortic valve sclerosis ( ), On home oxygen therapy, Platelet disorder ([facility]), Pneumonia, Psychiatric disorder, Pulmonary hypertension ([facility]), S/P cholecystectomy, Seizure ([facility]) (05/29/2022), Shoulder pain, Small bowel obstruction ([facility]), Splenomegaly, and VRE (vancomycin-resistant Enterococci).    PAST SURGICAL HISTORY:  has a past surgical history that includes pr anesth,nose,sinus surgery; makayla by laparoscopy (09/19/2009); exam under anesthesia (11/11/2009); hysterectomy, total abdominal; gastroscopy-endo (03/12/2010); bronchoscopy-endo (05/29/2012); bronchoscopy-endo (06/19/2012); sigmoidoscopy flex (09/26/2012); recovery  (02/27/2013); bronchoscopy-endo (11/15/2013); recovery (01/21/2014); recovery (03/24/2014); hemorrhoidectomy (11/11/2009); gastroscopy (09/28/2012); carpal tunnel release; bone marrow aspiration (12/31/2012); bone marrow biopsy, ndl/trocar (12/31/2012); recovery (03/31/2014); other abdominal surgery (12/2011); bone marrow aspiration (03/16/2015); bone marrow biopsy, ndl/trocar (03/16/2015); lung biopsy open (11/2016); tonsillectomy; other abdominal surgery (12/2015); breast biopsy (Left, 02/21/2017); colonoscopy (N/A, 03/27/2017); gastroscopy (N/A, 03/27/2017); gastric bypass laparoscopic (2018); hernia repair; makayla by laparoscopy; mammoplasty reduction; panniculectomy (12/11/2017); other surgical procedure; turbinate reduction (Bilateral, 10/04/2018); nasal reconstruction (Bilateral, 10/04/2018); ventral hernia repair robotic xi (N/A, 11/12/2018); craniotomy (Left, 08/04/2021); pr upper gi endoscopy,diagnosis (N/A, 02/03/2022); pr colonoscopy,diagnostic (N/A, 6/21/2022); pr upper gi endoscopy,diagnosis (N/A, 6/21/2022); and pr partial hip replacement (Right, 8/28/2022).    ALLERGIES:   Allergies   Allergen Reactions    Nsaids Unspecified     Can not take due to hx of liver transplant     Rhubarb Anaphylaxis    Other Drug Unspecified     Any medication that may interact with cyclosporine, tacrolimus, sirolimus, or prograf (due to hx of liver transplant)     Tylenol Unspecified     Liver transplant    Vancomycin Unspecified     Red man syndrome       CURRENT MEDICATIONS:   Home Medications       Reviewed by Colleen Givens (Pharmacy Tech) on 09/28/22 at 0711  Med List Status: Complete     Medication Last Dose Status   albuterol (PROAIR HFA) 108 (90 Base) MCG/ACT Aero Soln inhalation aerosol UNKN Active   ambrisentan (LETAIRIS) 10 MG tablet UNKN Active   fludrocortisone (FLORINEF) 0.1 MG Tab UNKN Active   lacosamide (VIMPAT) 100 MG Tab tablet UNKN Active   levetiracetam (KEPPRA) 750 MG tablet UNKN Active  "  metoclopramide (REGLAN) 10 MG Tab UNKN Active   mycophenolate (CELLCEPT) 250 MG Cap UNKN Active   omeprazole (PRILOSEC) 40 MG delayed-release capsule UNKN Active   ondansetron (ZOFRAN ODT) 4 MG TABLET DISPERSIBLE UNKN Active   potassium chloride SA (K-DUR) 10 MEQ Tab CR UNKN Active   pravastatin (PRAVACHOL) 20 MG Tab UNKN Active   sertraline (ZOLOFT) 100 MG Tab UNKN Active   tacrolimus (PROGRAF) 1 MG Cap UNKN Active   tadalafil (CIALIS) 20 MG tablet UNKN Active                    FAMILY HISTORY: family history includes Alcohol/Drug in her maternal grandfather, maternal grandmother, and mother; Cancer in her paternal aunt; Diabetes in her father; Heart Disease in her father; Hyperlipidemia in her father and mother; Hypertension in her father; Non-contributory in her mother; Other in her father; Stroke in her father.    SOCIAL HISTORY:  reports that she has never smoked. She has never used smokeless tobacco. She reports that she does not currently use alcohol. She reports that she does not use drugs.    REVIEW OF SYSTEMS: Comprehensive review of systems is negative with the exception of the aforementioned HPI, PMH, and PSH bullets in accordance with CMS guidelines.    PHYSICAL EXAMINATION:      Vital Signs: /56   Pulse 87   Temp 36.9 °C (98.4 °F) (Temporal)   Resp (!) 32   Ht 1.753 m (5' 9\")   Wt 56.5 kg (124 lb 9 oz)   SpO2 98%   Physical Exam  General: Laying in bed and in no distress  HEENT: Pupils equally round reactive to light, extraocular muscles intact, oropharynx without lesions, sutured laceration  Neck: Supple with full range of motion  Chest: Bilateral breath sounds with symmetrical chest excursion  Cardiovascular: Regular rate and rhythm  Abdomen: Soft, nontender, nondistended  Pelvis: Stable and nontender  Extremities: No open wounds or deformities  Neurologic: Grossly intact  Vascular: Palpable radial femoral pulses  Skin: Warm and dry  Psychiatric: Interacts appropriately  LABORATORY " VALUES:   Recent Labs     09/28/22  0356   WBC 3.0*   RBC 3.51*   HEMOGLOBIN 10.5*   HEMATOCRIT 33.8*   MCV 96.3   MCH 29.9   MCHC 31.1*   RDW 50.8*   PLATELETCT 107*   MPV 9.3     Recent Labs     09/28/22  0356   SODIUM 142   POTASSIUM 4.0   CHLORIDE 103   CO2 26   GLUCOSE 121*   BUN 11   CREATININE 0.73   CALCIUM 8.9     Recent Labs     09/28/22  0356   ASTSGOT 20   ALTSGPT 13   TBILIRUBIN 0.2   ALKPHOSPHAT 104*   GLOBULIN 2.4   INR 0.93     Recent Labs     09/28/22  0356   APTT 27.0   INR 0.93        IMAGING:   CT-TSPINE W/O PLUS RECONS   Final Result         1.  No acute traumatic bony injury of the thoracic spine.      CT-LSPINE W/O PLUS RECONS   Final Result         1.  No acute traumatic bony injury of the lumbar spine.   2.  Atherosclerosis      CT-HEAD W/O   Final Result         1.  5.5 mm right mixed density right holohemispheric subdural hematoma   2.  2.2 mm left holohemispheric subdural hematoma.   3.  Right sylvian subarachnoid hemorrhages.   4.  Nonspecific white matter changes, commonly associated with small vessel ischemic disease.  Associated mild cerebral atrophy is noted.      Based solely on CT findings, the brain injury guideline category is mBIG 3.      EDH   IVH   Displaced skull fx   SDH > 8mm   IPH > 8mm or multiple   SAH bi-hemispheric or > 3mm      The original BIG retrospective analysis found radiographic progression in 0% of BIG 1 patients and 2.6% BIG 2.      These findings were discussed with the patient's clinician, Jr Tamez, on 9/28/2022 5:01 AM.      CT-CSPINE WITHOUT PLUS RECONS   Final Result         1.  Multilevel degenerative changes of the cervical spine limit diagnostic sensitivity of this examination, otherwise no acute traumatic bony injury of the cervical spine is apparent.   2.  Atherosclerosis      CT-CHEST,ABDOMEN,PELVIS WITH   Final Result         1.  No significant abnormality in thorax, abdomen and pelvis CT scan.   2.  Atherosclerosis   3.  Pulmonary  nodule, see nodule follow-up recommendations below.      Fleischner Society pulmonary nodule recommendations:      Low Risk: No routine follow-up      High Risk: Optional CT at 12 months      Comments: Nodules less than 6 mm do not require routine follow-up, but certain patients at high risk with suspicious nodule morphology, upper lobe location, or both may warrant 12-month follow-up.      Low Risk - Minimal or absent history of smoking and of other known risk factors.      High Risk - History of smoking or of other known risk factors.      Note: These recommendations do not apply to lung cancer screening, patients with immunosuppression, or patients with known primary cancer.      Fleischner Society 2017 Guidelines for Management of Incidentally Detected Pulmonary Nodules in Adults      DX-CHEST-LIMITED (1 VIEW)   Final Result         1.  No acute cardiopulmonary disease.   2.  Atherosclerosis      DX-PELVIS-1 OR 2 VIEWS   Final Result         1.  No acute traumatic bony injury.      MR-LUMBAR SPINE-W/O    (Results Pending)   CT-HEAD W/O    (Results Pending)       Problems:    Subdural hematoma, post-traumatic (HCC)  5.5 mm right mixed density right holohemispheric subdural hematoma 2.2 mm left holohemispheric subdural hematoma. Right sylvian subarachnoid hemorrhages.  BIG 3.  Follow up head CT in 6 hrs.  Non-operative management.  Post traumatic pharmacologic seizure prophylaxis for 1 week.  Speech Language Pathology cognitive evaluation.  Maxwell Okeefe MD. Neurosurgeon. [facility].    Contraindication to deep vein thrombosis (DVT) prophylaxis  Prophylactic anticoagulation for thrombotic prevention initially contraindicated secondary to elevated bleeding risk.  9/30 Trauma surveillance venous duplex scanning ordered.    Scalp laceration, initial encounter  Posterior scalp laceration.  Repaired with staples in ED.  Routine staples removal in 7 days.    Alcohol use  Admission blood alcohol  level of 44.9.  Alcohol withdrawal surveillance.    History of seizures  Chronic condition treated with Keppra and lacosamide.  Resumed maintenance medication on admission.     Chronic, continuous use of opioids  Chronic pain treated with Oxycodone and MS Contin.  Definitive medication reconciliation pending.    Trauma  Ground level fall. Felt dizzy and fell.  Trauma Green Activation.  Nir Moreno MD. Trauma Surgery.    Syncope  Reports feeling dizzy prior to fall. History of dizziness per chart review.  Syncope vs opioid use/acohol use.  EKG with sinus rhythm.  Most recent ECHO 3/2022 with normal left ventricular size, thickness, and systolic function and EF 60%     Pulmonary nodule  5 mm right upper lobe pulmonary nodule.  Follow up with primary    Hypothyroidism  Chronic condition treated with Levothyroxine.  Definitive medication reconciliation pending.    History of craniotomy  Left craniotomy in .    Low back pain  Lumbar pain on assessment in trauma bay.  CT without acute injury.  On further assessment pt confused and unable to participate with exam.  MRI pending   Assessment and plan:  62-year-old woman with a seizure disorder now status post a ground-level fall.  She does have injuries includin.  Traumatic brain injury-big 3 by criteria.  Follow-up head CT has been ordered.  Every hour neurochecks.  2.  Scalp laceration-sutured in the emergency room.  She will require ICU admission    DISPOSITION: Trauma ICU.  Trauma tertiary survey.    CRITICAL CARE TIME: 32 minutes excluding procedures.       ____________________________________     Nir Moreno M.D.    DD: 2022  7:33 AM

## 2022-09-28 NOTE — ASSESSMENT & PLAN NOTE
Patient evaluated by trauma service and neurosurgery with recommendation for nonsurgical management  PT OT sania's  SNF referral given recurrent falls  Neurosurgery recommend repeat head CT in 2 weeks    10/1 I had a long discussion with the patient regarding the placement.  Post acute is recommended per PT OT.  Physiatry saw the patient, not a candidate for inpatient rehab and recommended SNF for continued therapy and for prevention given recent R hip fracture.  However patient adamantly declined SNF, and wants to go home with home health.  She states she has 24/7 care at home by the .     I talked to the , the  stated himself is not in good shape and do prefer patient to go to SNF.  I told the , he needs to discussed with the patient and make their decision.      10/2 Length discussion with the patient, and the  on the phone, the final decision from the patient and the  is discharging to home with home health

## 2022-09-28 NOTE — DIETARY
"Nutrition services: Day 0 of admit.  Roxana Cuba is a 62 y.o. female with admitting DX of Trauma - GLF.  Consult received for BMI <19.    Assessment:  Height: 175.3 cm (5' 9\")  Weight: 56.5 kg (124 lb 9 oz)  Body mass index is 18.39 kg/m²., BMI classification: Underweight  Diet/Intake: Regular    Evaluation:   Pt sleeping at time of visit, but  at bedside was able to answer questions.   Pt has a history of a gastric sleeve in 2015 and gastric bypass in 2018.  states pt lost a lot of weight after these surgeries. At home she eats small amounts at a time.  states she has lost about 5 lb in the past year. Per chart review, weight on 8/27/22 = 52 kg. 5/29/22 = 55.8 kg. Weight currently stable, though per chart review, she previously lost 16% from December to May 2022.  Pt ate breakfast and lunch today per  and he feels she ate well. Breakfast was recorded at % and lunch <25%.   Per , pt does not like supplements like Boost, but he believes she will drink Chobani smoothies.  Observed pt with depression at [facility].    Malnutrition Risk: Pt with severe malnutrition in context of chronic illness as evidenced by severe muscle wasting at [facility], recent 16% weight loss x 6 months.    Recommendations/Plan:  Chobani smoothies BID.   Encourage intake of meals >50%.  Document intake of all meals as % taken in ADL's to provide interdisciplinary communication across all shifts.   Monitor weight.  Nutrition rep will continue to see patient for ongoing meal and snack preferences.     RD following      "

## 2022-09-28 NOTE — CARE PLAN
The patient is Watcher - Medium risk of patient condition declining or worsening    Shift Goals  Clinical Goals: Stable neuro, repeat CT  Patient Goals: Rest  Family Goals: No family present    Progress made toward(s) clinical / shift goals:    Plan of care explained to patient, to be discussed further during IDT rounds. Pain managed per MAR and appropriate interventions. Neuro status currently stable with q1h neuro checks and repeat head CT today.    Problem: Knowledge Deficit - Standard  Goal: Patient and family/care givers will demonstrate understanding of plan of care, disease process/condition, diagnostic tests and medications  Outcome: Progressing     Problem: Pain - Standard  Goal: Alleviation of pain or a reduction in pain to the patient’s comfort goal  Outcome: Progressing     Problem: Neuro Status  Goal: Neuro status will remain stable or improve  Outcome: Progressing       Patient is not progressing towards the following goals:

## 2022-09-28 NOTE — ASSESSMENT & PLAN NOTE
Circumstances of her fall are not clear as she has no clear recollection of the event.  Could be related to postconcussion syncope.

## 2022-09-28 NOTE — ASSESSMENT & PLAN NOTE
Right posterior scalp laceration repaired with staples in ED.  Plan for staple removal in 7 days (10/5).

## 2022-09-28 NOTE — ED NOTES
BS report to SICU team  Pt transferred upstairs in stable condition by SICU team w/ chart (drivers license) and all belongings     called and notified of pt admission status

## 2022-09-28 NOTE — ASSESSMENT & PLAN NOTE
Prophylactic anticoagulation for thrombotic prevention initially contraindicated secondary to elevated bleeding risk.  9/30 Trauma surveillance venous duplex scanning ordered.  9/30 Trauma screening bilateral lower extremity venous duplex negative for above knee DVT.   Ambulate TID.

## 2022-09-28 NOTE — ASSESSMENT & PLAN NOTE
Continue home dose of mycophenolate and tacrolimus  Reviewed importance of complete abstinence from alcohol

## 2022-09-29 PROBLEM — R13.10 DYSPHAGIA: Status: ACTIVE | Noted: 2022-01-01

## 2022-09-29 NOTE — CARE PLAN
The patient is Watcher - Medium risk of patient condition declining or worsening    Shift Goals  Clinical Goals: Stable/improved neuro exam, mobility, hemodynamic and oxygenation stability  Patient Goals: Rest  Family Goals: No family present    Progress made toward(s) clinical / shift goals:    Problem: Skin Integrity  Goal: Skin integrity is maintained or improved  Outcome: Progressing   Q2h turns. Pillows in use for support and positioning.     Problem: Fall Risk  Goal: Patient will remain free from falls  Outcome: Progressing   Fall precautions in place. Bed in lowest position with bed brakes applied. Bed alarm in use. Call light within reach. Patient instructed to use call light to alert staff of needs and before getting out of bed, patient verbalized understanding. RN charting outside patient's room.     Problem: Pain - Standard  Goal: Alleviation of pain or a reduction in pain to the patient’s comfort goal  Outcome: Progressing     Problem: Neuro Status  Goal: Neuro status will remain stable or improve  Outcome: Progressing   Neuro checks per protocol.

## 2022-09-29 NOTE — DISCHARGE PLANNING
[facility] Transitional Care Coordination    Referral from: JAYA Mendoza  Insurance Provider on Facesheet: SCP  Potential Rehab Diagnosis: TBI    Chart review indicates patient may need on going medical management and may have therapy needs to possibly meet inpatient rehab facility criteria with the goal of returning to community.    D/C support: Spouse     Physiatry consultation forwarded per protocol.     TBI/SDH s/p fall, patient was recently denied 8/28 for poor endurance and baseline dementia. APS report filed for concerns of self-neglect d/t multiple admissions and non-compliance.     Physiatry to consult, patient is not a candidate for IPR d/t poor endurance and unsafe dc disposition. TCC will no longer follow.    Thank you for the referral.

## 2022-09-29 NOTE — THERAPY
Occupational Therapy   Initial Evaluation     Patient Name: Roxana Cuba  Age:  62 y.o., Sex:  female  Medical Record #: [id number]  Today's Date: 9/29/2022     Precautions: Fall Risk, Swallow Precautions ( See Comments), Posterior Hip Precautions, Weight Bearing As Tolerated Right Lower Extremity  Comments: recent R hip replacement    Assessment  Patient is 62 y.o. female admitted with traumatic SDH and frequent falls. PMH of seizures, craniotomy, liver transplant, chronic use of opioids, etoh abuse, etoh abuse, and R hip hemiarthroplasty on 8/28/2022 seen for OT evaluation. Pt limited by confusion, poor insight, poor safety awareness, weakness, limited activity tolerance, and poor balance. Pt reported pain in abdomin and fatigue 2/2 recently working with PT. Will need to validate home set up below, as pt appeared confused and was emotionally labile throughout. Recommend post acute placement. Will follow for skilled OT.    Plan    Recommend Occupational Therapy 3 times per week until therapy goals are met for the following treatments:  Adaptive Equipment, Cognitive Skill Development, Neuro Re-Education / Balance, Self Care/Activities of Daily Living, Therapeutic Activities, and Therapeutic Exercises.    DC Equipment Recommendations: (P) Unable to determine at this time  Discharge Recommendations: (P) Recommend post-acute placement for additional occupational therapy services prior to discharge home        09/29/22 1356   Prior Living Situation   Housing / Facility [address]   Bathroom Set up Bathtub / Shower Combination  (sponge baths)   Equipment Owned Front-Wheel Walker;Wheelchair   Lives with - Patient's Self Care Capacity Spouse   Comments Pt reported above information, however pt confused, may need to clarify   Prior Level of ADL Function   Self Feeding Independent   Grooming / Hygiene Independent   Bathing Requires Assist   Dressing Requires Assist   Toileting Requires Assist   Comments reported spouse  "has been assisting with sponge baths, socks, and all IADLs   Prior Level of IADL Function   Medication Management Requires Assist   Laundry Requires Assist   Kitchen Mobility Requires Assist   Finances Requires Assist   Home Management Requires Assist   Shopping Requires Assist   Driving / Transportation Relatives / Others Provide Transportation   Comments pt reports she cooks   History of Falls   History of Falls Yes   Date of Last Fall   (reason for admit)   Precautions   Precautions Fall Risk;Swallow Precautions ( See Comments);Posterior Hip Precautions;Weight Bearing As Tolerated Right Lower Extremity   Pain 0 - 10 Group   Therapist Pain Assessment   (reporting pain in stomach)   Cognition    Cognition / Consciousness X   Orientation Level Not Oriented to Reason   Level of Consciousness Alert   Ability To Follow Commands 1 Step   Safety Awareness Impaired;Impulsive   New Learning Impaired   Comments Pt confused, reporting \"I just found out where I am, I used to word at [employer], that's terrible\". Per RN pt confused and emotionally labile   Active ROM Upper Body   Active ROM Upper Body  WDL   Strength Upper Body   Upper Body Strength  X   Gross Strength Generalized Weakness, Equal Bilaterally.    Balance Assessment   Sitting Balance (Static) Fair   Sitting Balance (Dynamic) Fair -   Standing Balance (Static) Poor +   Standing Balance (Dynamic) Poor   Weight Shift Sitting Fair   Weight Shift Standing Poor   Bed Mobility    Supine to Sit Minimal Assist   Sit to Supine Minimal Assist   Scooting Minimal Assist   ADL Assessment   Grooming Moderate Assist;Seated   Lower Body Dressing Maximal Assist   Toileting Minimal Assist  (per RN min A on BSC)   How much help from another person does the patient currently need...   6 Clicks Daily Activity Score 16   Functional Mobility   Sit to Stand Minimal Assist   Mobility EOB, STS, side steps   Patient / Family Goals   Patient / Family Goal #1 none stated   Short Term Goals "   Short Term Goal # 1 Pt will demo LB dressing min A   Short Term Goal # 2 Pt will tolerate 5 min standing G/H SBA   Short Term Goal # 3 Pt will complete toileting SPV   Education Group   Role of Occupational Therapist Patient Response Patient;Acceptance;Explanation   Problem List   Problem List Decreased Active Daily Living Skills;Decreased Homemaking Skills;Decreased Upper Extremity Strength Left;Decreased Upper Extremity Strength Right;Decreased Functional Mobility;Decreased Activity Tolerance;Safety Awareness Deficits / Cognition;Impaired Cognitive Function;Impaired Postural Control / Balance   Anticipated Discharge Equipment and Recommendations   DC Equipment Recommendations Unable to determine at this time   Discharge Recommendations Recommend post-acute placement for additional occupational therapy services prior to discharge home

## 2022-09-29 NOTE — CARE PLAN
The patient is Stable - Low risk of patient condition declining or worsening    Shift Goals  Clinical Goals: Stable neuro exam, frequent reorientation, pulm hygiene, increase ADLs  Patient Goals: Call my   Family Goals: not present at bedside    Progress made toward(s) clinical / shift goals:       Problem: Knowledge Deficit - Standard  Goal: Patient and family/care givers will demonstrate understanding of plan of care, disease process/condition, diagnostic tests and medications  Outcome: Progressing     Problem: Skin Integrity  Goal: Skin integrity is maintained or improved  Outcome: Progressing     Problem: Fall Risk  Goal: Patient will remain free from falls  Outcome: Progressing     Problem: Pain - Standard  Goal: Alleviation of pain or a reduction in pain to the patient’s comfort goal  Outcome: Progressing     Problem: Neuro Status  Goal: Neuro status will remain stable or improve  Outcome: Progressing

## 2022-09-29 NOTE — PROGRESS NOTES
Trauma / Surgical Daily Progress Note    Date of Service  9/29/2022    Chief Complaint  62 y.o. female admitted 9/28/2022 with traumatic subdural hematoma and frequent falls.     Interval Events  Reports nausea after eating. Generalized abdominal pain and constipation.   Repeat head CT grossly stable.   MR L-spine reviewed, no acute injuries.   Medicine consult completed yesterday.     - Advance bowel protocol.   - Medically cleared for transfer to [facility].   - PM&R consult pending.   - Medicine team to assume primary care once transferred out of ICU.   - Appreciate medicine teams consult.     Review of Systems  Review of Systems   Constitutional:  Positive for malaise/fatigue. Negative for chills and fever.   Eyes:  Negative for blurred vision and double vision.   Respiratory:  Negative for cough and shortness of breath.    Cardiovascular:  Negative for chest pain.   Gastrointestinal:  Positive for abdominal pain, constipation and nausea. Negative for vomiting.        Small BM today.    Genitourinary:         Holbrook, voiding   Musculoskeletal:  Positive for myalgias. Negative for back pain and neck pain.   Neurological:  Positive for headaches. Negative for dizziness, tingling, sensory change, speech change and focal weakness.      Vital Signs  Temp:  [36.2 °C (97.1 °F)-37.1 °C (98.7 °F)] 36.8 °C (98.2 °F)  Pulse:  [61-96] 72  Resp:  [12-26] 17  BP: (101-137)/(57-74) 118/65  SpO2:  [92 %-99 %] 97 %    Physical Exam  Physical Exam  Vitals and nursing note reviewed.   Constitutional:       General: She is not in acute distress.     Appearance: She is underweight.      Interventions: Nasal cannula in place.   HENT:      Head: Normocephalic and atraumatic.      Mouth/Throat:      Mouth: Mucous membranes are dry.   Eyes:      Extraocular Movements: Extraocular movements intact.      Conjunctiva/sclera: Conjunctivae normal.      Pupils: Pupils are equal, round, and reactive to light.   Cardiovascular:      Rate and Rhythm:  Normal rate and regular rhythm.   Pulmonary:      Effort: Pulmonary effort is normal. No respiratory distress.      Breath sounds: Normal breath sounds.   Abdominal:      General: Bowel sounds are increased. There is no distension.      Palpations: Abdomen is soft.      Comments: Minimal generalized abdominal tenderness.  Freely moving chronic abdominal wall hernia.    Musculoskeletal:      Cervical back: Normal range of motion and neck supple. No rigidity or tenderness.      Right lower leg: No edema.      Left lower leg: No edema.      Comments: Moves all extremities   Old surgical incision right hip   Skin:     Coloration: Skin is pale.      Comments: Scattered bruising   Neurological:      Mental Status: She is alert, oriented to person, place, and time and easily aroused.      GCS: GCS eye subscore is 4. GCS verbal subscore is 5. GCS motor subscore is 6.       Laboratory  Recent Results (from the past 24 hour(s))   POCT glucose device results    Collection Time: 09/28/22  5:50 PM   Result Value Ref Range    POC Glucose, Blood 105 (H) 65 - 99 mg/dL   POCT glucose device results    Collection Time: 09/29/22 12:18 AM   Result Value Ref Range    POC Glucose, Blood 109 (H) 65 - 99 mg/dL   POCT glucose device results    Collection Time: 09/29/22  5:39 AM   Result Value Ref Range    POC Glucose, Blood 102 (H) 65 - 99 mg/dL   TSH WITH REFLEX TO FT4    Collection Time: 09/29/22  6:30 AM   Result Value Ref Range    TSH 0.370 (L) 0.380 - 5.330 uIU/mL   CBC with Differential: Tomorrow AM    Collection Time: 09/29/22  6:30 AM   Result Value Ref Range    WBC 3.7 (L) 4.8 - 10.8 K/uL    RBC 3.73 (L) 4.20 - 5.40 M/uL    Hemoglobin 11.1 (L) 12.0 - 16.0 g/dL    Hematocrit 35.5 (L) 37.0 - 47.0 %    MCV 95.2 81.4 - 97.8 fL    MCH 29.8 27.0 - 33.0 pg    MCHC 31.3 (L) 33.6 - 35.0 g/dL    RDW 49.7 35.9 - 50.0 fL    Platelet Count 103 (L) 164 - 446 K/uL    MPV 9.3 9.0 - 12.9 fL    Neutrophils-Polys 63.60 44.00 - 72.00 %    Lymphocytes  23.60 22.00 - 41.00 %    Monocytes 9.20 0.00 - 13.40 %    Eosinophils 3.00 0.00 - 6.90 %    Basophils 0.30 0.00 - 1.80 %    Immature Granulocytes 0.30 0.00 - 0.90 %    Nucleated RBC 0.00 /100 WBC    Neutrophils (Absolute) 2.35 2.00 - 7.15 K/uL    Lymphs (Absolute) 0.87 (L) 1.00 - 4.80 K/uL    Monos (Absolute) 0.34 0.00 - 0.85 K/uL    Eos (Absolute) 0.11 0.00 - 0.51 K/uL    Baso (Absolute) 0.01 0.00 - 0.12 K/uL    Immature Granulocytes (abs) 0.01 0.00 - 0.11 K/uL    NRBC (Absolute) 0.00 K/uL   Comp Metabolic Panel (CMP): Tomorrow AM    Collection Time: 09/29/22  6:30 AM   Result Value Ref Range    Sodium 140 135 - 145 mmol/L    Potassium 4.6 3.6 - 5.5 mmol/L    Chloride 104 96 - 112 mmol/L    Co2 28 20 - 33 mmol/L    Anion Gap 8.0 7.0 - 16.0    Glucose 117 (H) 65 - 99 mg/dL    Bun 12 8 - 22 mg/dL    Creatinine 0.60 0.50 - 1.40 mg/dL    Calcium 9.0 8.5 - 10.5 mg/dL    AST(SGOT) 17 12 - 45 U/L    ALT(SGPT) 13 2 - 50 U/L    Alkaline Phosphatase 97 30 - 99 U/L    Total Bilirubin 0.3 0.1 - 1.5 mg/dL    Albumin 3.0 (L) 3.2 - 4.9 g/dL    Total Protein 5.6 (L) 6.0 - 8.2 g/dL    Globulin 2.6 1.9 - 3.5 g/dL    A-G Ratio 1.2 g/dL   MAGNESIUM    Collection Time: 09/29/22  6:30 AM   Result Value Ref Range    Magnesium 2.0 1.5 - 2.5 mg/dL   PHOSPHORUS    Collection Time: 09/29/22  6:30 AM   Result Value Ref Range    Phosphorus 3.5 2.5 - 4.5 mg/dL   ESTIMATED GFR    Collection Time: 09/29/22  6:30 AM   Result Value Ref Range    GFR (CKD-EPI) 101 >60 mL/min/1.73 m 2   FREE THYROXINE    Collection Time: 09/29/22  6:30 AM   Result Value Ref Range    Free T-4 1.10 0.93 - 1.70 ng/dL       Fluids    Intake/Output Summary (Last 24 hours) at 9/29/2022 1321  Last data filed at 9/29/2022 1200  Gross per 24 hour   Intake 590 ml   Output 1030 ml   Net -440 ml       Core Measures & Quality Metrics  Labs reviewed, Medications reviewed and Radiology images reviewed  Holbrook catheter: Urinary Tract Retention or Urinary Tract Obstruction      DVT  Prophylaxis: Contraindicated - High bleeding risk  DVT prophylaxis - mechanical: SCDs  Ulcer prophylaxis: Yes      RAP Score Total: 6  CAGE Results: not completed Blood Alcohol>0.08: yes       Assessment complete date: 9/28/2022 (unable to complete secondary mentation)      Assessment/Plan  * Subdural hematoma, post-traumatic (HCC)- (present on admission)  Assessment & Plan  5.5 mm right mixed density right holohemispheric subdural hematoma 2.2 mm left holohemispheric subdural hematoma. Right sylvian subarachnoid hemorrhages.  BIG 3.  Non-operative management.  Post traumatic pharmacologic seizure prophylaxis for 1 week.  9/28 Follow up head CT grossly stable.  9/29 Repeat head CT in 2 weeks.   Speech Language Pathology cognitive evaluation.  Maxwell Okeefe MD. Neurosurgeon. [facility].    Dysphagia- (present on admission)  Assessment & Plan  9/28 Swallow evaluation completed. Diet per SLP.  SLP following.     Low back pain- (present on admission)  Assessment & Plan  Lumbar pain on assessment in trauma bay.  CT without acute injury.  On further assessment pt confused and unable to participate with exam.  9/28 L-spine MR with no acute injuries.     History of seizures- (present on admission)  Assessment & Plan  Chronic condition treated with Keppra and lacosamide.  Resumed maintenance medication on admission.     Alcohol use- (present on admission)  Assessment & Plan  Admission blood alcohol level of 44.9.  Alcohol withdrawal surveillance.    Frequent falls- (present on admission)  Assessment & Plan   consult for evaluation of home safety.     Contraindication to deep vein thrombosis (DVT) prophylaxis- (present on admission)  Assessment & Plan  Prophylactic anticoagulation for thrombotic prevention initially contraindicated secondary to elevated bleeding risk.  9/30 Trauma surveillance venous duplex scanning ordered.    Syncope- (present on admission)  Assessment & Plan  Reports  feeling dizzy prior to fall. History of dizziness per chart review.  Syncope vs opioid use/acohol use.  EKG with sinus rhythm.  Most recent ECHO 3/2022 with normal left ventricular size, thickness, and systolic function and EF 60%     Chronic, continuous use of opioids- (present on admission)  Assessment & Plan  Chronic pain treated with Oxycodone and MS Contin.  Definitive medication reconciliation pending.    Heart burn- (present on admission)  Assessment & Plan  Chronic condition treated with Omperazole.  Resumed maintenance medication on admission.      History of craniotomy- (present on admission)  Assessment & Plan  Left craniotomy in 2011.    Pulmonary nodule- (present on admission)  Assessment & Plan  5 mm right upper lobe pulmonary nodule.  Follow up with primary    Scalp laceration, initial encounter- (present on admission)  Assessment & Plan  Posterior scalp laceration.  Repaired with staples in ED.  Routine staples removal in 7 days.    Trauma- (present on admission)  Assessment & Plan  Ground level fall. Felt dizzy and fell.  Trauma Green Activation.  Nir Moreno MD. Trauma Surgery.    Hypothyroidism- (present on admission)  Assessment & Plan  Chronic condition treated with Levothyroxine.  Medication not found on admission reconciliation.     Status post liver transplant Dr. Canada ([facility])- (present on admission)  Assessment & Plan  December 2011.  Status post liver transplant for end stage liver disease, followed by Dr. Canada.    Discussed patient condition with Hospitalist, RN, Patient, and trauma surgery. Dr. Andriy Owusu.

## 2022-09-29 NOTE — DOCUMENTATION QUERY
[facility]                                                                       Query Response Note      PATIENT:               VICK LI  ACCT #:                  [id number]  MRN:                     [id number]  :                      1960  ADMIT DATE:       2022 3:45 AM  DISCH DATE:          RESPONDING  PROVIDER #:        [id number]           QUERY TEXT:    Please clarify the clinical relevance for the diagnostic finding of WBC/RBC/Platelets - 3.0/3.51/107 on admission.  Previous labs -  WBC/RBC/Platelets - 2.9/3.56/161;  WBC/RBC/Platelets - 4.1/2.29/141.   Treatment includes repeat labs and monitoring.    NOTE:  If an appropriate response is not listed below, please respond with a new note.      The patient's clinical indicators include:   Admit labs: WBC/RBC/Platelets - 3.0/3.51/107   labs: WBC/RBC/Platelets - 2.9/3.56/161   labs: WBC/RBC/Platelets - 4.1/.29/141    Treatment: repeat labs and monitoring    Risk factors: Medications - Cellcept and Prograf for Liver transplant    Thank you,  Rowena Dee BSN  Clinical   Connect via [employer]  Options provided:   -- Immunosuppressive drug induced pancytopenia   -- Pancytopenia due to other condition, (Please specify other condition)   -- Other explanation, (please specify the other explanation)   -- Unable to determine      Query created by: Rowena Dee on 2022 10:50 AM    RESPONSE TEXT:    Unable to determine       QUERY TEXT:    61 y/o female presents after feeling dizzy and having a GLF with brief loss of consciousness and confusion/altered mental status.  Found to have a traumatic subdural hematoma and scalp laceration. Admission blood alcohol level of 44.9. Per H&P diagnosis of chronic continuous use of opioids for chronic pain using Oxycodone and MS Contin.  ED Note documents GCS 13; upgraded to trauma  green considering head trauma and altered mental status.  Can a more specific diagnosis be provided?    NOTE:  If the appropriate response is not listed below, please respond with a new note.    The patient's Clinical Indicators include:  ED Note - GCS 13; upgraded to a trauma green  alert considering her head trauma and altered mental status.  H&P - On further assessment pt confused and unable to participate with exam.  9/28 NS Consult - Neurologic:  A and O x2, confused  9/28 PN ([facility])-  Mental Status: She is easily aroused. Mental status is at baseline.; Mental status adequate for full examination?: No    Treatment - CT Head; repeat labs/CT and monitoring    Risk factors - Traumatic subdural hematoma, alcohol use, chronic opioid use    Thank you,  Rowena Dee BSN  Clinical   Connect via [employer]  Options provided:   -- Acute encephalopathy due to traumatic hematoma   -- Acute alcoholic encephalopathy   -- Acute encephalopathy due to other medical condition, (please specify other medical condition)   -- Other explanation, (please specify the other explanation)   -- Unable to determine      Query created by: Rowena Dee on 9/29/2022 2:53 PM    RESPONSE TEXT:    Acute encephalopathy due to traumatic hematoma          Electronically signed by:  LUCINDA ORTEGA MD 9/29/2022 4:58 PM

## 2022-09-29 NOTE — PROGRESS NOTES
Neurosurgery Progress Note    Subjective:  No events    Exam:    A&O x3, GCS 15  PERRL, EOMI  Face symm, tongue midline  HOPE with FS, no drift  Slow to respond        BP  Min: 86/49  Max: 137/65  Pulse  Av.3  Min: 61  Max: 96  Resp  Av.4  Min: 12  Max: 39  Temp  Av.8 °C (98.2 °F)  Min: 36.2 °C (97.1 °F)  Max: 37.2 °C (98.9 °F)  SpO2  Av.5 %  Min: 93 %  Max: 100 %    No data recorded    Recent Labs     22  0356 22  0630   WBC 3.0* 3.7*   RBC 3.51* 3.73*   HEMOGLOBIN 10.5* 11.1*   HEMATOCRIT 33.8* 35.5*   MCV 96.3 95.2   MCH 29.9 29.8   MCHC 31.1* 31.3*   RDW 50.8* 49.7   PLATELETCT 107* 103*   MPV 9.3 9.3     Recent Labs     22  0356 22  0630   SODIUM 142 140   POTASSIUM 4.0 4.6   CHLORIDE 103 104   CO2 26 28   GLUCOSE 121* 117*   BUN 11 12   CREATININE 0.73 0.60   CALCIUM 8.9 9.0     Recent Labs     22  0356   APTT 27.0   INR 0.93           Intake/Output                         22 0700 - 22 0659 22 0700 - 22 0659     1900-0659 Total 1900-0659 Total                 Intake    P.O.  660  -- 660  --  -- --    P.O. 660 -- 660 -- -- --    Other  --  90 90  --  -- --    Medications (PO/Enteral Liquids) -- 90 90 -- -- --    Total Intake 660 90 750 -- -- --       Output    Urine  395  705 1100  --  -- --    Output (mL) (Urethral Catheter Non-latex 16 Fr.)  -- -- --    Stool  --  -- --  --  -- --    Number of Times Stooled -- 1 x 1 x -- -- --    Total Output  -- -- --       Net I/O     265 -615 -350 -- -- --              Intake/Output Summary (Last 24 hours) at 2022 0728  Last data filed at 2022 0600  Gross per 24 hour   Intake 750 ml   Output 1100 ml   Net -350 ml             lacosamide  100 mg BID    Respiratory Therapy Consult   Continuous RT    Pharmacy Consult Request  1 Each PHARMACY TO DOSE    ondansetron  4 mg Q4HRS PRN    ondansetron  4 mg Q4HRS PRN    docusate sodium  100 mg BID     senna-docusate  1 Tablet Nightly    senna-docusate  1 Tablet Q24HRS PRN    polyethylene glycol/lytes  1 Packet BID    magnesium hydroxide  30 mL DAILY    bisacodyl  10 mg Q24HRS PRN    sodium phosphate  1 Each Once PRN    oxyCODONE immediate-release  2.5 mg Q3HRS PRN    Or    oxyCODONE immediate-release  5 mg Q3HRS PRN    labetalol  10 mg Q4HRS PRN    levETIRAcetam  1,500 mg Q12HRS    Or    levETIRAcetam (Keppra) IV  1,500 mg Q12HRS    insulin regular  1-6 Units Q6HRS    And    dextrose bolus  25 g Q15 MIN PRN    ambrisentan  10 mg DAILY    fludrocortisone  0.1 mg DAILY    metoclopramide  10 mg BID    mycophenolate  250 mg BID    omeprazole  40 mg DAILY    potassium chloride SA  10 mEq DAILY    tadalafil  20 mg DAILY    tacrolimus  2 mg BID    sertraline  150 mg DAILY    pravastatin  20 mg DAILY       Assessment and Plan:  Hospital day # 2 right [facility]  Chemical prophylactic DVT therapy: No  Start date/time: pt ambulatory  Repeat CT grossly stable  Ok to go to floor  Cont home AEDs  Repeat CT 2 weeks

## 2022-09-29 NOTE — THERAPY
"Physical Therapy   Daily Treatment     Patient Name: Roxana Cuba  Age:  62 y.o., Sex:  female  Medical Record #: [id number]  Today's Date: 9/29/2022     Precautions  Precautions: Fall Risk;Swallow Precautions ( See Comments);Posterior Hip Precautions;Weight Bearing As Tolerated Right Lower Extremity  Comments: recent R hip replacement    Assessment    Pt received in bed and agreeable to PT session. Pt demonstrated improved tolerance to activity and was able to progress with ambulation to 50ft with a FWW. Pt was mildly unsteady throughout and noted to have confusion surrounding how she got to the hospital. Pt c/o a headache prior to this session, but did not complain about it throughout mobilization. Updated RN on pt performance. Continue to recommend placement for further rehab at this time. Will follow for acute PT to progress as able.    Plan    Continue current treatment plan.    DC Equipment Recommendations: Unable to determine at this time  Discharge Recommendations: Recommend post-acute placement for additional physical therapy services prior to discharge home      Subjective    \"Oh, I used to be the  at [facility]\"     Objective       09/29/22 1201   Precautions   Precautions Fall Risk;Swallow Precautions ( See Comments);Posterior Hip Precautions;Weight Bearing As Tolerated Right Lower Extremity   Comments recent R hip replacement   Vitals   Vitals Comments Pt maintained on 2L at rest and 3L with ambulation per pt request. SpO2 >90%. Pt reports her concentrator is set to 4L at home.   Pain 0 - 10 Group   Therapist Pain Assessment Post Activity Pain Same as Prior to Activity;Nurse Notified  (reports pain in stomach, not rated)   Cognition    Level of Consciousness Alert   Ability To Follow Commands 1 Step   Comments Pleasant and cooperative, although confused and emotionally labile. Pt at one point reported she was the  at [facility], unknown if true.   Balance   Sitting Balance (Static) Fair   Sitting Balance " (Dynamic) Fair   Standing Balance (Static) Fair -   Standing Balance (Dynamic) Fair -   Weight Shift Sitting Fair   Weight Shift Standing Poor   Skilled Intervention Verbal Cuing;Tactile Cuing;Compensatory Strategies   Comments FWW in standing, mildly antalgic   Gait Analysis   Gait Level Of Assist Minimal Assist   Assistive Device Front Wheel Walker   Distance (Feet) 50   # of Times Distance was Traveled 2   Deviation Shuffled Gait;Bradykinetic;Increased Base Of Support   Comments Pt mildly antalgic with weightbearing on the R side, had SHIMA ~1 month ago.   Bed Mobility    Supine to Sit Minimal Assist   Sit to Supine Standby Assist   Scooting Minimal Assist   Skilled Intervention Verbal Cuing;Compensatory Strategies   Comments HOB at 30 deg   Functional Mobility   Sit to Stand Contact Guard Assist   Skilled Intervention Verbal Cuing   Comments Pt declined staying up in a chair to end the session.   ICU Target Mobility Level   ICU Mobility - Targeted Level Level 4   How much difficulty does the patient currently have...   Turning over in bed (including adjusting bedclothes, sheets and blankets)? 3   Sitting down on and standing up from a chair with arms (e.g., wheelchair, bedside commode, etc.) 2   Moving from lying on back to sitting on the side of the bed? 2   How much help from another person does the patient currently need...   Moving to and from a bed to a chair (including a wheelchair)? 3   Need to walk in a hospital room? 3   Climbing 3-5 steps with a railing? 2   6 clicks Mobility Score 15   Short Term Goals    Short Term Goal # 1 pt will be able to complete supine<>Sitting from flat bed with SPV in 6tx in order to progress to home   Goal Outcome # 1 goal not met   Short Term Goal # 2 pt will be able to complete functional transfers with FWW and SPV in 6tx in order to decrease fall risk   Goal Outcome # 2 Goal not met   Short Term Goal # 3 pt will be able to ambulate 25ft with FWW and min assist in 6tx in  order to progress to home   Goal Outcome # 3 Goal met, new goal added   Short Term Goal # 3 B Pt will ambulate 100ft with FWW and supervision to progress towards home in 6 visits.   Anticipated Discharge Equipment and Recommendations   DC Equipment Recommendations Unable to determine at this time   Discharge Recommendations Recommend post-acute placement for additional physical therapy services prior to discharge home   Interdisciplinary Plan of Care Collaboration   IDT Collaboration with  Nursing   Patient Position at End of Therapy In Bed;Bed Alarm On;Call Light within Reach;Tray Table within Reach;Phone within Reach   Collaboration Comments RN updated   Session Information   Date / Session Number  9/29-2(2/4, 10/4)

## 2022-09-30 PROBLEM — M54.50 LOW BACK PAIN: Status: RESOLVED | Noted: 2022-01-01 | Resolved: 2022-01-01
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
## 2022-09-30 NOTE — DISCHARGE PLANNING
HTH/SCP TCN chart review completed. Collaborated with SEGUNDO Wyatt prior to meeting with the pt. The most current review of medical record, knowledge of pt's PLOF and social support, LACE+ score of 78, 6 clicks scores of 21 (noted in AM 6 clicks at 15) mobility were considered.      Pt seen at bedside. Introduced TCN program. Provided education regarding post acute levels of care. Discussed HTH/SCP plan benefits (Meds to Beds, medical uber and [facility] transitional care). Pt verbalizes understanding though note pt appears confused at time of visit and per review is not fully oriented. TCN placed call to pt's spouse Otis who agrees pt currently requires post acute placement. Noted PMR/TCC noted indicated pt not RIRF candidate at this time. Spouse agreeable to SNF choices and completed via spouse conversation as pt currently appears unable, though note pt does defer to spouse for choice as well. SNF choice faxed to [facility] and [facility] kept original copies if needed. Noted pt has been accepted to [facility] at time of writing this note. Pending [facility] though per review, family would have to bring in medication to SNF.    PT and OT with recs for post acute placement and as above, choice proactively obtained for SNF. TCN will continue to follow and collaborate with discharge planning team as additional post acute needs arise. Thank you.     Completed today:  Choice obtained: SNF  [facility] referral not sent; anticipating SNF placement
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
## 2022-09-30 NOTE — DISCHARGE PLANNING
Received Choice form at [phone]  Agency/Facility Name: [facility]  Referral sent per Choice form @ [phone]

## 2022-09-30 NOTE — DISCHARGE PLANNING
1325  Agency/Facility Name: [facility]  Spoke To: Aravind  Outcome: DPA returned call from vmail received. If Pt is accepted, family would have to bring in medication

## 2022-09-30 NOTE — PROGRESS NOTES
4 Eyes Skin Assessment Completed by  PENNY Jean-Baptiste and PENNY Castillo.    Head Edema and Incision staples to posterior head   Ears WDL  Nose WDL  Mouth WDL  Neck WDL  Breast/Chest WDL  Shoulder Blades WDL  Spine WDL  (R) Arm/Elbow/Hand WDL  (L) Arm/Elbow/Hand WDL  Abdomen WDL  Groin WDL  Scrotum/Coccyx/Buttocks WDL blanchable erythema to sacrum, mepilex applied   (R) Leg scab to R knee, bruising to R shin  (L) Leg WDL  (R) Heel/Foot/Toe WDL  (L) Heel/Foot/Toe WDL          Devices In Places Blood Pressure Cuff      Interventions In Place N/A    Possible Skin Injury No    Pictures Uploaded Into Epic N/A  Wound Consult Placed N/A  RN Wound Prevention Protocol Ordered No

## 2022-09-30 NOTE — PROGRESS NOTES
Pt transferred to unit via . She is admitted with diagnosis of subdural hematoma. She is A&O to self and place and at times situation. Continues on 2L NC, no s/s of distress noted, ag draining to gravity. She requires x1 assist with walker, tolerates. Oriented to room and staff, needed items, hydration at bedside. Bed alarm on, will monitor.  called this RN for updated, no further questions from him at the time.

## 2022-09-30 NOTE — PROGRESS NOTES
Hospital Medicine Daily Progress Note    Date of Service  9/30/2022    Chief Complaint  Roxana Cuba is a 62 y.o. female admitted 9/28/2022 with fall and ICH     Hospital Course  62 y.o. female who presented 9/28/2022 with past medical history of liver transplant on immunosuppressive therapy, pulmonary hypertension, chronic hypoxic respiratory failure on 4 L of oxygen, seizures, history of repeated falls, subdural hematoma on 8/2022 who presents to the hospital after a ground-level fall.  Patient states that time she was walking down the hallway where she felt lightheaded and dizzy.  CT scan of the head found a 5.5 mm right holohemispheric subdural hematoma, 2.2 mm left hemispheric subdural hematoma.  Right sylvian subarachnoid hemorrhages.  CT C-spine was negative.  CT chest abdomen pelvis was negative.  Trauma team evaluated patient and determined that she should have a nonoperative management and repeat CT scan in 6 hours.  She did have a scalp laceration stapled in the emergency room.  Neurosurgery did evaluate the patient and stated that she did have a stable bleed.  Repeat CT scan of the head found a mildly increased size of the extra-axial hemorrhage at the right frontal lobe.  The bilateral lateral holohemispheric hematomas are similar in size.      Interval Problem Update    Patient is afebrile on 2 L nasal cannula  Complains of generalized malaise anxious to go home discussed PT OT recommendations for postacute placement and given recurrent falls with intracranial bleed consideration of referral to SNF.  Also discussed with physiatry at bedside and nursing staff        I have discussed this patient's plan of care and discharge plan at IDT rounds today with Case Management, Nursing, Nursing leadership, and other members of the IDT team.    Consultants/Specialty  neurosurgery and trauma surgery    Code Status  Full Code    Disposition  Patient is medically cleared for discharge.   Anticipate discharge to  to skilled nursing facility.  I have placed the appropriate orders for post-discharge needs.    Review of Systems  Review of Systems   Constitutional:  Positive for malaise/fatigue.   Cardiovascular:  Negative for chest pain.   Gastrointestinal:  Negative for abdominal pain, nausea and vomiting.   Musculoskeletal:  Positive for falls.   All other systems reviewed and are negative.     Physical Exam  Temp:  [36 °C (96.8 °F)-37.5 °C (99.5 °F)] 37.4 °C (99.4 °F)  Pulse:  [70-92] 76  Resp:  [14-19] 16  BP: (120-137)/(63-80) 134/80  SpO2:  [93 %-97 %] 96 %    Physical Exam  Vitals and nursing note reviewed.   Constitutional:       General: She is not in acute distress.  HENT:      Head: Normocephalic.      Nose: Nose normal. No rhinorrhea.      Mouth/Throat:      Pharynx: No oropharyngeal exudate or posterior oropharyngeal erythema.   Eyes:      General:         Right eye: No discharge.         Left eye: No discharge.   Cardiovascular:      Rate and Rhythm: Normal rate and regular rhythm.      Heart sounds: Normal heart sounds.   Pulmonary:      Effort: Pulmonary effort is normal. No respiratory distress.      Breath sounds: Normal breath sounds. No stridor. No wheezing.   Chest:      Chest wall: No tenderness.   Abdominal:      General: There is no distension.      Palpations: Abdomen is soft.      Tenderness: There is no abdominal tenderness. There is no rebound.   Musculoskeletal:         General: No swelling or tenderness.      Cervical back: No rigidity.   Skin:     General: Skin is warm and dry.      Coloration: Skin is not cyanotic or jaundiced.      Nails: There is no clubbing.   Neurological:      General: No focal deficit present.      Mental Status: She is alert and oriented to person, place, and time.      Cranial Nerves: No cranial nerve deficit.   Psychiatric:         Behavior: Behavior normal.       Fluids    Intake/Output Summary (Last 24 hours) at 9/30/2022 1333  Last data filed at 9/30/2022 8489  Gross  per 24 hour   Intake 850 ml   Output 880 ml   Net -30 ml       Laboratory  Recent Labs     09/28/22  0356 09/29/22  0630 09/30/22  0408   WBC 3.0* 3.7* 3.5*   RBC 3.51* 3.73* 3.27*   HEMOGLOBIN 10.5* 11.1* 9.7*   HEMATOCRIT 33.8* 35.5* 30.5*   MCV 96.3 95.2 93.3   MCH 29.9 29.8 29.7   MCHC 31.1* 31.3* 31.8*   RDW 50.8* 49.7 47.5   PLATELETCT 107* 103* 100*   MPV 9.3 9.3 8.5*     Recent Labs     09/28/22  0356 09/29/22  0630 09/30/22  0408   SODIUM 142 140 139   POTASSIUM 4.0 4.6 3.9   CHLORIDE 103 104 103   CO2 26 28 25   GLUCOSE 121* 117* 120*   BUN 11 12 12   CREATININE 0.73 0.60 0.57   CALCIUM 8.9 9.0 8.6     Recent Labs     09/28/22  0356   APTT 27.0   INR 0.93               Imaging  US-TRAUMA VEIN SCREEN LOWER BILAT EXTREMITY         MR-LUMBAR SPINE-W/O   Final Result         Mild lumbar spine degenerative changes without significant spinal canal stenosis.      There is moderate left foraminal narrowing at L5-S1 with impingement upon the exiting left L5 nerve.      CT-HEAD W/O   Final Result      1.  Mildly increased size of the extra axial hemorrhage at the right frontal lobe.   2.  Otherwise similar size of the bilateral lateral holohemispheric extra-axial hematomas. Regional sulcal effacement on the right has mildly worsened.   3.  Layering subarachnoid hemorrhage along the falx and right tentorium likely related to redistribution.   4.  Minimally increased right sylvian fissure subarachnoid hemorrhage.   5.  Basal cisterns are patent.      CT-TSPINE W/O PLUS RECONS   Final Result         1.  No acute traumatic bony injury of the thoracic spine.      CT-LSPINE W/O PLUS RECONS   Final Result         1.  No acute traumatic bony injury of the lumbar spine.   2.  Atherosclerosis      CT-HEAD W/O   Final Result         1.  5.5 mm right mixed density right holohemispheric subdural hematoma   2.  2.2 mm left holohemispheric subdural hematoma.   3.  Right sylvian subarachnoid hemorrhages.   4.  Nonspecific white  matter changes, commonly associated with small vessel ischemic disease.  Associated mild cerebral atrophy is noted.      Based solely on CT findings, the brain injury guideline category is mBIG 3.      EDH   IVH   Displaced skull fx   SDH > 8mm   IPH > 8mm or multiple   SAH bi-hemispheric or > 3mm      The original BIG retrospective analysis found radiographic progression in 0% of BIG 1 patients and 2.6% BIG 2.      These findings were discussed with the patient's clinician, Jr Tamez, on 9/28/2022 5:01 AM.      CT-CSPINE WITHOUT PLUS RECONS   Final Result         1.  Multilevel degenerative changes of the cervical spine limit diagnostic sensitivity of this examination, otherwise no acute traumatic bony injury of the cervical spine is apparent.   2.  Atherosclerosis      CT-CHEST,ABDOMEN,PELVIS WITH   Final Result         1.  No significant abnormality in thorax, abdomen and pelvis CT scan.   2.  Atherosclerosis   3.  Pulmonary nodule, see nodule follow-up recommendations below.      Fleischner Society pulmonary nodule recommendations:      Low Risk: No routine follow-up      High Risk: Optional CT at 12 months      Comments: Nodules less than 6 mm do not require routine follow-up, but certain patients at high risk with suspicious nodule morphology, upper lobe location, or both may warrant 12-month follow-up.      Low Risk - Minimal or absent history of smoking and of other known risk factors.      High Risk - History of smoking or of other known risk factors.      Note: These recommendations do not apply to lung cancer screening, patients with immunosuppression, or patients with known primary cancer.      Fleischner Society 2017 Guidelines for Management of Incidentally Detected Pulmonary Nodules in Adults      DX-CHEST-LIMITED (1 VIEW)   Final Result         1.  No acute cardiopulmonary disease.   2.  Atherosclerosis      DX-PELVIS-1 OR 2 VIEWS   Final Result         1.  No acute traumatic bony injury.            Assessment/Plan  * Subdural hematoma, post-traumatic (HCC)- (present on admission)  Assessment & Plan  Patient evaluated by trauma service and neurosurgery with recommendation for nonsurgical management  PT OT eval's  SNF referral given recurrent falls  Neurosurgery recommend repeat head CT in 2 weeks    Low back pain- (present on admission)  Assessment & Plan  MRI negative for fractures     MRI:  Mild lumbar spine degenerative changes without significant spinal canal stenosis.     There is moderate left foraminal narrowing at L5-S1 with impingement upon the exiting left L5 nerve    PT OT and pain management    Pulmonary nodule- (present on admission)  Assessment & Plan  Outpatient follow-up    History of seizures- (present on admission)  Assessment & Plan  On Keppra and Vimpat    Alcohol use- (present on admission)  Assessment & Plan  Reviewed with patient importance of complete abstinence given history of liver transplant    Scalp laceration, initial encounter- (present on admission)  Assessment & Plan  Staples will need to be removed 7 days from admission    Frequent falls- (present on admission)  Assessment & Plan  Recurrent falls with ICH  Recent total hip arthroplasty  Reviewed risks of recurrent falls and recommended SNF referral based on PT OT evaluations    Syncope- (present on admission)  Assessment & Plan  Circumstances of her fall are not clear as she has no clear recollection of the event.  Could be related to postconcussion syncope.      Thrombocytopenia (HCC)- (present on admission)  Assessment & Plan  Chronic monitor CBC    Pulmonary hypertension (HCC)- (present on admission)  Assessment & Plan  Last echo found normal RV function    On Cialis and Ambrisentan follows with [facility]  Appears euvolemic    Chronic respiratory failure (HCC)- (present on admission)  Assessment & Plan  On 4 L of O2 which is her baseline    Status post liver transplant Dr. [place] ([facility])- (present on  admission)  Assessment & Plan  Continue home dose of mycophenolate and tacrolimus  Reviewed importance of complete abstinence from alcohol       VTE prophylaxis: SCDs/TEDs    I have performed a physical exam and reviewed and updated ROS and Plan today (9/30/2022). In review of yesterday's note (9/29/2022), there are no changes except as documented above.

## 2022-09-30 NOTE — HOSPITAL COURSE
62 y.o. female who presented 9/28/2022 with past medical history of liver transplant on immunosuppressive therapy, pulmonary hypertension, chronic hypoxic respiratory failure on 4 L of oxygen, seizures, history of repeated falls, subdural hematoma on 8/2022 who presents to the hospital after a ground-level fall.  Patient states that time she was walking down the hallway where she felt lightheaded and dizzy.  CT scan of the head found a 5.5 mm right holohemispheric subdural hematoma, 2.2 mm left hemispheric subdural hematoma.  Right sylvian subarachnoid hemorrhages.  CT C-spine was negative.  CT chest abdomen pelvis was negative.  Trauma team evaluated patient and determined that she should have a nonoperative management and repeat CT scan in 6 hours.  She did have a scalp laceration stapled in the emergency room.  Neurosurgery did evaluate the patient and stated that she did have a stable bleed.  Repeat CT scan of the head found a mildly increased size of the extra-axial hemorrhage at the right frontal lobe.  The bilateral lateral holohemispheric hematomas are similar in size.

## 2022-09-30 NOTE — DIETARY
Nutrition Services Brief Update:    Day 2 of admit.  Roxana Cuba is a 62 y.o. female with admitting DX of Trauma.     Current Diet: Regular.     Problem: Nutritional:  Goal: Achieve adequate nutritional intake  Description: Patient will consume >50% of meals  Outcome: Progressing. Per ADL, pt eating % of last recorded meal, PO slightly variable ranging from <25% to % of meals. RD to monitor for consistently adequate intake.

## 2022-09-30 NOTE — TELEPHONE ENCOUNTER
"Home Health paperwork received from [facility] requiring provider signature.     All appropriate fields completed by Medical Assistant: N/A CMA printed and distributed to MA    Paperwork placed in \"MA to Provider\" folder/basket. Awaiting provider completion/signature.    "

## 2022-09-30 NOTE — CARE PLAN
The patient is Stable - Low risk of patient condition declining or worsening    Shift Goals  Clinical Goals: Improved neuro exams  Patient Goals: pain management  Family Goals: not present at bedside    Progress made toward(s) clinical / shift goals:  Pt mobility level IV    Patient is not progressing towards the following goals: Pt reports uncontrolled pain: 10/10 rating throughout the day      Problem: Knowledge Deficit - Standard  Goal: Patient and family/care givers will demonstrate understanding of plan of care, disease process/condition, diagnostic tests and medications  Outcome: Not Progressing     Problem: Pain - Standard  Goal: Alleviation of pain or a reduction in pain to the patient’s comfort goal  Outcome: Not Progressing      Problem: Skin Integrity  Goal: Skin integrity is maintained or improved  Outcome: Progressing     Problem: Fall Risk  Goal: Patient will remain free from falls  Outcome: Progressing

## 2022-09-30 NOTE — PROGRESS NOTES
Trauma / Surgical Daily Progress Note    Date of Service  9/30/2022    Chief Complaint  62 y.o. female admitted 9/28/2022 with a scalp laceration and SDH after a GLF.    Interval Events  Transfer from [facility] to [place] [id number]  Tearful and wants to go home  Trauma DVT screening negative  Case discussed with Dr. Arabella ag  - Plan for staple removal on 10/5  - Follow up with neurology and neurosurgery upon discharge  - Trauma surgery will sign off at this time, primary management per medicine team    Review of Systems  Review of Systems   Constitutional:  Negative for chills and fever.   HENT:  Negative for hearing loss.    Eyes:  Negative for blurred vision and double vision.   Respiratory:  Negative for shortness of breath.    Cardiovascular:  Negative for chest pain.   Gastrointestinal:  Negative for abdominal pain, constipation ( 9/30), nausea and vomiting.   Musculoskeletal:  Negative for back pain, joint pain, myalgias and neck pain.   Skin:  Negative for rash.   Neurological:  Positive for headaches. Negative for dizziness, tingling, tremors, sensory change, speech change and focal weakness.      Vital Signs  Temp:  [36 °C (96.8 °F)-37.5 °C (99.5 °F)] 37.4 °C (99.4 °F)  Pulse:  [70-92] 76  Resp:  [14-19] 16  BP: (120-137)/(63-80) 134/80  SpO2:  [92 %-97 %] 92 %    Physical Exam  Physical Exam  Vitals and nursing note reviewed.   Constitutional:       General: She is awake. She is not in acute distress.     Appearance: She is underweight. She is ill-appearing. She is not toxic-appearing or diaphoretic.      Interventions: Nasal cannula in place.      Comments: Sitting at edge of bed   HENT:      Head: Normocephalic.      Comments: Evolving right forehead contusion  Right posterior scalp laceration approximated with staples     Right Ear: External ear normal.      Left Ear: External ear normal.      Nose: Nose normal.      Mouth/Throat:      Mouth: Mucous membranes are moist.      Pharynx: Oropharynx is  clear.   Eyes:      Pupils: Pupils are equal, round, and reactive to light.   Pulmonary:      Effort: Pulmonary effort is normal. No respiratory distress.   Musculoskeletal:      Cervical back: Neck supple.      Comments: Moves all extremities   Skin:     General: Skin is warm and dry.      Coloration: Skin is pale.      Findings: Bruising present.   Neurological:      Mental Status: She is alert.      GCS: GCS eye subscore is 4. GCS verbal subscore is 5. GCS motor subscore is 6.   Psychiatric:         Mood and Affect: Affect is tearful.         Behavior: Behavior is cooperative.       Laboratory  Recent Results (from the past 24 hour(s))   POCT glucose device results    Collection Time: 09/29/22 11:12 PM   Result Value Ref Range    POC Glucose, Blood 115 (H) 65 - 99 mg/dL   CBC with Differential: Tomorrow AM    Collection Time: 09/30/22  4:08 AM   Result Value Ref Range    WBC 3.5 (L) 4.8 - 10.8 K/uL    RBC 3.27 (L) 4.20 - 5.40 M/uL    Hemoglobin 9.7 (L) 12.0 - 16.0 g/dL    Hematocrit 30.5 (L) 37.0 - 47.0 %    MCV 93.3 81.4 - 97.8 fL    MCH 29.7 27.0 - 33.0 pg    MCHC 31.8 (L) 33.6 - 35.0 g/dL    RDW 47.5 35.9 - 50.0 fL    Platelet Count 100 (L) 164 - 446 K/uL    MPV 8.5 (L) 9.0 - 12.9 fL    Neutrophils-Polys 70.40 44.00 - 72.00 %    Lymphocytes 18.50 (L) 22.00 - 41.00 %    Monocytes 6.50 0.00 - 13.40 %    Eosinophils 3.70 0.00 - 6.90 %    Basophils 0.30 0.00 - 1.80 %    Immature Granulocytes 0.60 0.00 - 0.90 %    Nucleated RBC 0.00 /100 WBC    Neutrophils (Absolute) 2.48 2.00 - 7.15 K/uL    Lymphs (Absolute) 0.65 (L) 1.00 - 4.80 K/uL    Monos (Absolute) 0.23 0.00 - 0.85 K/uL    Eos (Absolute) 0.13 0.00 - 0.51 K/uL    Baso (Absolute) 0.01 0.00 - 0.12 K/uL    Immature Granulocytes (abs) 0.02 0.00 - 0.11 K/uL    NRBC (Absolute) 0.00 K/uL   Comp Metabolic Panel (CMP): Tomorrow AM    Collection Time: 09/30/22  4:08 AM   Result Value Ref Range    Sodium 139 135 - 145 mmol/L    Potassium 3.9 3.6 - 5.5 mmol/L    Chloride  103 96 - 112 mmol/L    Co2 25 20 - 33 mmol/L    Anion Gap 11.0 7.0 - 16.0    Glucose 120 (H) 65 - 99 mg/dL    Bun 12 8 - 22 mg/dL    Creatinine 0.57 0.50 - 1.40 mg/dL    Calcium 8.6 8.5 - 10.5 mg/dL    AST(SGOT) 14 12 - 45 U/L    ALT(SGPT) 9 2 - 50 U/L    Alkaline Phosphatase 86 30 - 99 U/L    Total Bilirubin 0.3 0.1 - 1.5 mg/dL    Albumin 3.0 (L) 3.2 - 4.9 g/dL    Total Protein 5.2 (L) 6.0 - 8.2 g/dL    Globulin 2.2 1.9 - 3.5 g/dL    A-G Ratio 1.4 g/dL   ESTIMATED GFR    Collection Time: 09/30/22  4:08 AM   Result Value Ref Range    GFR (CKD-EPI) 102 >60 mL/min/1.73 m 2   POCT glucose device results    Collection Time: 09/30/22  5:04 AM   Result Value Ref Range    POC Glucose, Blood 112 (H) 65 - 99 mg/dL       Fluids    Intake/Output Summary (Last 24 hours) at 9/30/2022 1450  Last data filed at 9/30/2022 1356  Gross per 24 hour   Intake 1210 ml   Output 880 ml   Net 330 ml       Core Measures & Quality Metrics  Labs reviewed, Medications reviewed and Radiology images reviewed  Ag Catheter: Trial ag removal.      DVT Prophylaxis: Contraindicated - High bleeding risk and Not indicated at this time, ambulatory  DVT prophylaxis - mechanical: SCDs  Ulcer prophylaxis: Yes    Assessed for rehab: Patient returned to prior level of function, rehabilitation not indicated at this time  RAP Score Total: 6  CAGE Results: not completed Blood Alcohol>0.08: yes       Assessment complete date: 9/28/2022 (unable to complete secondary mentation)      Assessment/Plan  * Subdural hematoma, post-traumatic (HCC)- (present on admission)  Assessment & Plan  5.5 mm right mixed density right holohemispheric subdural hematoma 2.2 mm left holohemispheric subdural hematoma. Right sylvian subarachnoid hemorrhages.  BIG 3.  Repeat head CT stable.  Non-operative management.  Speech Language Pathology cognitive evaluation pending.  Follow up for repeat CT head in 2 weeks.  Maxwell Okeefe MD. Neurosurgeon. [facility].    History of seizures- (present on admission)  Assessment & Plan  Chronic condition treated with Keppra and lacosamide.  Resumed maintenance medication on admission.    Contraindication to deep vein thrombosis (DVT) prophylaxis- (present on admission)  Assessment & Plan  Prophylactic anticoagulation for thrombotic prevention initially contraindicated secondary to elevated bleeding risk.  9/30 Trauma surveillance venous duplex scanning ordered.  9/30 Trauma screening bilateral lower extremity venous duplex negative for above knee DVT.   Ambulate TID.    Syncope- (present on admission)  Assessment & Plan  Reports feeling dizzy prior to fall. History of dizziness per chart review.  Syncope vs opioid use/acohol use.  EKG with sinus rhythm.  Most recent ECHO 3/2022 with normal left ventricular size, thickness, and systolic function and EF 60%.    History of craniotomy- (present on admission)  Assessment & Plan  Left craniotomy in 2011.    Scalp laceration, initial encounter- (present on admission)  Assessment & Plan  Right posterior scalp laceration repaired with staples in ED.  Plan for staple removal in 7 days (10/5).    Trauma- (present on admission)  Assessment & Plan  Ground level fall. Felt dizzy and fell.  Trauma Green Activation.  Nir Moreno MD. Trauma Surgery.      Discussed patient condition with Hospitalist, RN, Patient, and trauma surgery. Dr. Arabella Thomson and Dr. Owusu

## 2022-09-30 NOTE — CARE PLAN
Problem: Knowledge Deficit - Standard  Goal: Patient and family/care givers will demonstrate understanding of plan of care, disease process/condition, diagnostic tests and medications  Outcome: Progressing     Problem: Skin Integrity  Goal: Skin integrity is maintained or improved  Outcome: Progressing     Problem: Fall Risk  Goal: Patient will remain free from falls  Outcome: Progressing     Problem: Pain - Standard  Goal: Alleviation of pain or a reduction in pain to the patient’s comfort goal  Outcome: Progressing     Problem: Neuro Status  Goal: Neuro status will remain stable or improve  Outcome: Progressing   The patient is Stable - Low risk of patient condition declining or worsening    Shift Goals  Clinical Goals: safety neuro  Patient Goals: pain control  Family Goals: no falls    Progress made toward(s) clinical / shift goals:  no falls during admit    Patient is not progressing towards the following goals:

## 2022-09-30 NOTE — RESPIRATORY CARE
"COPD EDUCATION by COPD CLINICAL EDUCATOR  9/29/2022 at 5:00 PM by Mesha Miller, RRT     Patient interviewed by COPD education team. Patient refused COPD program at this time. Our team has sen patient many times.  An Action Plan was completed in the EMR to reflect current Respiratory Medication use.                  COPD Screen  COPD Risk Screening  Do you have a history of COPD?: Yes  Do you have a Pulmonologist?: Yes    COPD Assessment  COPD Clinical Specialists ONLY  COPD Education Initiated: Yes--Short Intervention (Met with Pt; progressive weakness since last encounter; GLF's; well known to team;hx Mixed Obstructive/Restrictive Lung Disease PFT noted; complex history established with Pulmonary)  DME Company: [employer]  DME Equipment Type: 3-4lpm Oxygen  Physician Follow Up Appointment: 09/30/22  Appt Time: 1120  Physician Name: Dr [place]-Thompson  call placed to change appointment due to admission  Pulmonologist Name: [facility] declined appointment assist last 8/12/2022 cancelled by pt/family  Smoking Cessation: No  Home Health Care:  (TBD will need SNF vs Home Health)  [facility]: Yes  [facility]: Yes (Plan she will be discharged to SNF gave information and will send)  Is this a COPD exacerbation patient?: No  $ Demo/Eval of SVN's, MDI's and Aerosols:  (declined)  PFT Results  Pulmonary Function Testing: Yes (2016 Mixed Obstructive Restrictive pattern no repeat testing)    Meds to Beds  Would the patient like to opt in for Bedside Medication Delivery at Discharge?: Yes, interested     MY COPD ACTION PLAN     It is recommended that patients and physicians /healthcare providers complete this action plan together. This plan should be discussed at each physician visit and updated as needed.    The green, yellow and red zones show groups of symptoms of COPD. This list of symptoms is not comprehensive, and you may experience other symptoms. In the \"Actions\" column, your healthcare provider has " "recommended actions for you to take based on your symptoms.    Patient Name: Roxana Cuba   YOB: 1960   Last Updated on:     Green Zone:  I am doing well today Actions     Usual activitiy and exercise level   Take daily medications     Usual amounts of cough and phlegm/mucus   Use oxygen as prescribed     Sleep well at night   Continue regular exercise/diet plan     Appetite is good   At all times avoid cigarette smoke, inhaled irritants     Daily Medications (these medications are taken every day):                Yellow Zone:  I am having a bad day or a COPD flare Actions     More breathless than usual   Continue daily medications     I have less energy for my daily activities   Use quick relief inhaler as ordered     Increased or thicker phlegm/mucus   Use oxygen as prescribed     Using quick relief inhaler/nebulizer more often   Get plenty of rest     Swelling of ankles more than usual   Use pursed lip breathing     More coughing than usual   At all times avoid cigarette smoke, inhaled irritants     I feel like I have a \"chest cold\"     Poor sleep and my symptoms woke me up     My appetite is not good     My medicine is not helping      Call provider immediately if symptoms don’t improve     Continue daily medications, add rescue medications:   Albuterol 2 Puffs Every 4 hours PRN       Medications to be used during a flare up, (as Discussed with Provider):              Red Zone:  I need urgent medical care Actions     Severe shortness of breath even at rest   Call 911 or seek medical care immediately     Not able to do any activity because of breathing      Fever or shaking chills      Feeling confused or very drowsy       Chest pains      Coughing up blood                  " none

## 2022-10-01 NOTE — DISCHARGE SUMMARY
Discharge Summary    CHIEF COMPLAINT ON ADMISSION  Chief Complaint   Patient presents with    Trauma Green     BIB [facility], GLF, - thinners. Pt felt dizzy and passed out. GCS 15 on scene, 13 on arrival. Pt lost approx 50-100cc of blood from head lac.       Reason for Admission  ems     Admission Date  9/28/2022    CODE STATUS  Full Code    HPI & HOSPITAL COURSE  62 y.o. female who presented 9/28/2022 with past medical history of liver transplant on immunosuppressive therapy, pulmonary hypertension, chronic hypoxic respiratory failure on 4 L of oxygen, seizures, history of repeated falls, subdural hematoma on 8/2022 who presents to the hospital after a ground-level fall.  Patient states that time she was walking down the hallway where she felt lightheaded and dizzy.  CT scan of the head found a 5.5 mm right holohemispheric subdural hematoma, 2.2 mm left hemispheric subdural hematoma.  Right sylvian subarachnoid hemorrhages.  CT C-spine was negative.  CT chest abdomen pelvis was negative.  Trauma team evaluated patient and determined that she should have a nonoperative management and repeat CT scan in 6 hours.  She did have a scalp laceration stapled in the emergency room.  Neurosurgery did evaluate the patient and stated that she did have a stable bleed.  Repeat CT scan of the head found a mildly increased size of the extra-axial hemorrhage at the right frontal lobe.  The bilateral lateral holohemispheric hematomas are similar in size. Follow up with Neuro surgeon Dr. Okeefe, repeat CT in 2 wks    Scalp laceration - plan for staple removal 10/5 /22    Post acute is recommended per PT OT.  Physiatry saw the patient, not a candidate for inpatient rehab and recommended SNF for continued therapy and for prevention given recent R hip fracture.   Patient has high risks of fall, SNF is highly recommended.   However patient adamantly declined SNF, and wants to go home with home health. Length discussion with the patient, and  the  on the phone, the final decision from the patient and the  is discharging to home with home health.  Home health arranged prior to discharge. Reviewed with patient importance of complete abstinence given history of liver transplant.     5 mm right upper lobe pulmonary nodule - recommend Outpatient follow-up, repeat CT in 6-12 mo      Therefore, she is discharged in good and stable condition to home with organized home healthcare and close outpatient follow-up.    The patient met 2-midnight criteria for an inpatient stay at the time of discharge.    Discharge Date  10/3/2022    FOLLOW UP ITEMS POST DISCHARGE  - Follow up with primary care physician in 1 week.     - Scalp laceration - plan for staple removal 10/5 /22    - Follow up with Neuro surgeon Dr. Okeefe, repeat CT in 1 wk    - 5 mm right upper lobe pulmonary nodule - recommend Outpatient follow-up, repeat CT chest in 6-12 mo    - Please take the medications as instructed    - Go to the local Emergency Department if you have any worsening condition.       DISCHARGE DIAGNOSES  Principal Problem:    Subdural hematoma, post-traumatic POA: Yes  Active Problems:    Status post liver transplant Dr. [place] ([facility]) POA: Yes      Overview: -December 2011: Status post liver transplant for end stage liver disease       at [facility], followed by Dr. Canada.                Hypothyroidism POA: Yes    Chronic respiratory failure (HCC) POA: Yes    Pulmonary hypertension (HCC) POA: Yes      Overview: Initial evaluation at [facility] pre liver transplant, PFO closed w/o       complication, 1/25/2011, then worse PAH and R HF post transplant,       extensive evaluations at [facility] in [place] with cath and drug trial, responding       to tadalafil and ambrisentan dramatically. Followed there with serial R       heart cath.      3/25/2014: Most recent R heart cath done Dr. Brenda Flores with mild       pulmonary hypertension and relatively high ouput      November  2014: Echocardiogram with normal LV size, LVEF 60-65%. Mild MR,       mild AI, mild TR, RVSP 29-34mmHg.      February 2015: Echocardiogram with mild concentric LVH, LVEF 65-70%. Mild       MR, mild AI, trace TR, RVSP 16mmHg.    Chronic, continuous use of opioids POA: Yes    Thrombocytopenia (HCC) POA: Yes    Syncope POA: Yes    Trauma POA: Yes    Contraindication to deep vein thrombosis (DVT) prophylaxis POA: Yes    Frequent falls POA: Yes    Scalp laceration, initial encounter POA: Yes    Alcohol use POA: Yes    History of seizures POA: Yes    Pulmonary nodule POA: Yes    History of craniotomy POA: Yes    Heart burn POA: Yes    Dysphagia POA: Yes  Resolved Problems:    Low back pain POA: Yes      FOLLOW UP  Future Appointments   Date Time Provider Department Center   10/4/2022  2:20 PM Halley Levine M.D. [facility] None   3/28/2023 10:20 AM Dl Jimenez M.D. [facility] None     Halley Levine M.D.  [address] [address] [place] [address]  [phone]    Follow up in 1 week(s)  Please call your primary care provider to schedule, recent hospitalization follow up    Maxwell Okeefe M.D.  [address]  [place] [address]  [phone]    Call in 1 week(s)  follow up recommended by specialist, repeat head CT      MEDICATIONS ON DISCHARGE     Medication List        START taking these medications        Instructions   oxyCODONE immediate-release 5 MG Tabs  Commonly known as: ROXICODONE   Take 1 Tablet by mouth every 8 hours as needed for Severe Pain for up to 3 days.  Dose: 5 mg            CHANGE how you take these medications        Instructions   levetiracetam 750 MG tablet  What changed: when to take this  Commonly known as: KEPPRA   Take 2 Tablets by mouth every 12 hours for 30 days.  Dose: 1,500 mg            CONTINUE taking these medications        Instructions   albuterol 108 (90 Base) MCG/ACT Aers inhalation aerosol  Commonly known as: ProAir HFA   Inhale 2 Puffs every four  hours as needed for Shortness of Breath (wheezing).  Dose: 2 Puff     ambrisentan 10 MG tablet  Commonly known as: LETAIRIS   TAKE 1 TABLET BY MOUTH EVERY DAY  Dose: 10 mg     amitriptyline 25 MG Tabs  Commonly known as: ELAVIL   Take 25 mg by mouth every evening.  Dose: 25 mg     clonazePAM 0.5 MG Tabs  Commonly known as: KLONOPIN   Take  by mouth 3 times a day.     fludrocortisone 0.1 MG Tabs  Commonly known as: FLORINEF   Take 0.1 mg by mouth every day.  Dose: 0.1 mg     lacosamide 100 MG Tabs tablet  Commonly known as: VIMPAT   Take 1 Tablet by mouth 2 times a day for 180 days.  Dose: 100 mg     metoclopramide 10 MG Tabs  Commonly known as: REGLAN   Take 10 mg by mouth 2 times a day.  Dose: 10 mg     mycophenolate 250 MG Caps  Commonly known as: CELLCEPT   Take 250 mg by mouth 2 times a day. Indications: Liver Transplant Recipient  Dose: 250 mg     omeprazole 40 MG delayed-release capsule  Commonly known as: PRILOSEC   Take 1 Capsule by mouth every day.  Dose: 40 mg     ondansetron 4 MG Tbdp  Commonly known as: ZOFRAN ODT   DISSOLVE 1 TAB ON TONGUE EVERY 8 HOURS AS NEEDED FOR NAUSEA     potassium chloride SA 10 MEQ Tbcr  Commonly known as: K-DUR   Take 1 Tablet by mouth every day.  Dose: 10 mEq     pravastatin 20 MG Tabs  Commonly known as: PRAVACHOL   Take 1 Tablet by mouth every day.  Dose: 20 mg     sertraline 100 MG Tabs  Commonly known as: Zoloft   Take 1.5 Tablets by mouth every day.  Dose: 150 mg     tacrolimus 1 MG Caps  Commonly known as: PROGRAF   Take 2 Capsules by mouth 2 times a day.  Dose: 2 mg     tadalafil 20 MG tablet  Commonly known as: CIALIS   Take 1 Tablet by mouth every day.  Dose: 20 mg     traZODone 50 MG Tabs  Commonly known as: DESYREL   Take 50 mg by mouth every evening.  Dose: 50 mg              Allergies  Allergies   Allergen Reactions    Nsaids Unspecified     Can not take due to hx of liver transplant     Rhubarb Anaphylaxis    Other Drug Unspecified     Any medication that may  interact with cyclosporine, tacrolimus, sirolimus, or prograf (due to hx of liver transplant)     Tylenol Unspecified     Liver transplant    Vancomycin Unspecified     Red man syndrome       DIET  Orders Placed This Encounter   Procedures    Diet Order Diet: Regular     Standing Status:   Standing     Number of Occurrences:   1     Order Specific Question:   Diet:     Answer:   Regular [1]       ACTIVITY  As tolerated.  Weight bearing as tolerated    CONSULTATIONS  neurosurgery and trauma surgery    PROCEDURES      LABORATORY  Lab Results   Component Value Date    SODIUM 135 10/03/2022    POTASSIUM 3.8 10/03/2022    CHLORIDE 100 10/03/2022    CO2 26 10/03/2022    GLUCOSE 173 (H) 10/03/2022    BUN 12 10/03/2022    CREATININE 0.68 10/03/2022        Lab Results   Component Value Date    WBC 4.0 (L) 10/03/2022    HEMOGLOBIN 11.7 (L) 10/03/2022    HEMATOCRIT 36.6 (L) 10/03/2022    PLATELETCT 124 (L) 10/03/2022        Total time of the discharge process exceeds 35 minutes.

## 2022-10-01 NOTE — PROGRESS NOTES
Pt woke up and c/o right hip pain at 8/10  Pt received Oxy around 1 am and another pain med is not due until 04.  Message sent to [facility] re: pain via [employer].

## 2022-10-01 NOTE — PROGRESS NOTES
Requested sleeping med.  Message sent to [facility] via Voalte.  He ordered melatonin.  RN went to give Melatonin and she refused to take it.  She said it doesn't work.

## 2022-10-01 NOTE — CARE PLAN
The patient is Stable - Low risk of patient condition declining or worsening    Shift Goals  Clinical Goals: Safety  Patient Goals: Pain Control  Family Goals: N/A    Progress made toward(s) clinical / shift goals:  Patient has remained free from falls and is resting.    Patient is not progressing towards the following goals:

## 2022-10-01 NOTE — PROGRESS NOTES
Pt has an order to d/c ag from yesterday.  Pt agreed RN to remove it this morning.  Ag removed at 0650.

## 2022-10-01 NOTE — PROGRESS NOTES
Placed strip bed alarm since pt is moderate risk for fall and hx of fall.  However, pt stated that plastic is making her uncomfortable.  Strip bed alarm taken off.  Pt does have built in bed alarm.

## 2022-10-01 NOTE — FACE TO FACE
Face to Face Supporting Documentation - Home Health    The encounter with this patient was in whole or in part the primary reason for home health admission.    Date of encounter:   Patient:                    MRN:                       YOB: 2022  Roxana Cuba  [id number]  1960     Home health to see patient for:  Skilled Nursing care for assessment, interventions & education, Medical social work consult, Home health aide, Physical Therapy evaluation and treatment, Occupational therapy evaluation and treatment, and Speech Language Pathology evaluation and treatment    Skilled need for:  Comment: [facility]    Skilled nursing interventions to include:  Comment: PT OT SLP    Homebound status evidenced by:  Have a condition such that leaving his or her home is medically contraindicated. Leaving home requires a considerable and taxing effort. There is a normal inability to leave the home.    Community Physician to provide follow up care: Halley Levine M.D.     Optional Interventions? No      I certify the face to face encounter for this home health care referral meets the CMS requirements and the encounter/clinical assessment with the patient was, in whole, or in part, for the medical condition(s) listed above, which is the primary reason for home health care. Based on my clinical findings: the service(s) are medically necessary, support the need for home health care, and the homebound criteria are met.  I certify that this patient has had a face to face encounter by myself.  Monse Ac M.D. - NPI: [id number]

## 2022-10-01 NOTE — CARE PLAN
Problem: Skin Integrity  Goal: Skin integrity is maintained or improved  Outcome: Progressing     Problem: Fall Risk  Goal: Patient will remain free from falls  Outcome: Progressing   The patient is Stable - Low risk of patient condition declining or worsening    Shift Goals  Clinical Goals: safety  Patient Goals: safety  Family Goals: N/A    Progress made toward(s) clinical / shift goals:  pillows in place for positioning.  Bed alarm activated    Patient is not progressing towards the following goals:

## 2022-10-01 NOTE — PROGRESS NOTES
[facility] Medicine Daily Progress Note    Date of Service  10/1/2022    Chief Complaint  Roxana Cuba is a 62 y.o. female admitted 9/28/2022 with fall and ICH     Hospital Course  62 y.o. female who presented 9/28/2022 with past medical history of liver transplant on immunosuppressive therapy, pulmonary hypertension, chronic hypoxic respiratory failure on 4 L of oxygen, seizures, history of repeated falls, subdural hematoma on 8/2022 who presents to the hospital after a ground-level fall.  Patient states that time she was walking down the hallway where she felt lightheaded and dizzy.  CT scan of the head found a 5.5 mm right holohemispheric subdural hematoma, 2.2 mm left hemispheric subdural hematoma.  Right sylvian subarachnoid hemorrhages.  CT C-spine was negative.  CT chest abdomen pelvis was negative.  Trauma team evaluated patient and determined that she should have a nonoperative management and repeat CT scan in 6 hours.  She did have a scalp laceration stapled in the emergency room.  Neurosurgery did evaluate the patient and stated that she did have a stable bleed.  Repeat CT scan of the head found a mildly increased size of the extra-axial hemorrhage at the right frontal lobe.  The bilateral lateral holohemispheric hematomas are similar in size.      Interval Problem Update  Reported right hip pain, recent hip fracture post surgery  Mild headache.  Denies nausea vomiting or focal weakness.    Scalp laceration - plan for staple removal 10/5   Trauma surgery sign off,   Follow up with Neuro surgeon Dr. Okeefe, repeat CT in 2 wks    I had a long discussion with the patient regarding the placement.  Post acute is recommended per PT OT.  Physiatry saw the patient, not a candidate for inpatient rehab and recommended SNF for continued therapy and for prevention given recent R hip fracture.  However patient adamantly declined SNF, and wants to go home with home health.  She states she has 24/7 care at home by the  .     I talked to the , the  stated himself is not in good shape and do prefer patient to go to SNF.  I told the , he needs to discussed with the patient and make their decision.        I have discussed this patient's plan of care and discharge plan at IDT rounds today with Case Management, Nursing, Nursing leadership, and other members of the IDT team.        I have discussed this patient's plan of care and discharge plan at IDT rounds today with Case Management, Nursing, Nursing leadership, and other members of the IDT team.    Consultants/Specialty  neurosurgery and trauma surgery    Code Status  Full Code    Disposition  Patient is medically cleared for discharge.   Anticipate discharge to to skilled nursing facility.  I have placed the appropriate orders for post-discharge needs.    Review of Systems  Review of Systems   Constitutional:  Positive for malaise/fatigue.   Cardiovascular:  Negative for chest pain.   Gastrointestinal:  Negative for abdominal pain, nausea and vomiting.   Musculoskeletal:  Positive for falls.   All other systems reviewed and are negative.     Physical Exam  Temp:  [36.3 °C (97.4 °F)-37.3 °C (99.2 °F)] 36.9 °C (98.4 °F)  Pulse:  [66-78] 73  Resp:  [16-18] 16  BP: (121-141)/(70-80) 121/80  SpO2:  [92 %-97 %] 97 %    Physical Exam  Vitals and nursing note reviewed.   Constitutional:       General: She is not in acute distress.  HENT:      Head: Normocephalic.      Nose: Nose normal. No rhinorrhea.      Mouth/Throat:      Pharynx: No oropharyngeal exudate or posterior oropharyngeal erythema.   Eyes:      General:         Right eye: No discharge.         Left eye: No discharge.   Cardiovascular:      Rate and Rhythm: Normal rate and regular rhythm.      Heart sounds: Normal heart sounds.   Pulmonary:      Effort: Pulmonary effort is normal. No respiratory distress.      Breath sounds: Normal breath sounds. No stridor. No wheezing.   Chest:      Chest wall: No  tenderness.   Abdominal:      General: There is no distension.      Palpations: Abdomen is soft.      Tenderness: There is no abdominal tenderness. There is no rebound.   Musculoskeletal:         General: No swelling or tenderness.      Cervical back: No rigidity.   Skin:     General: Skin is warm and dry.      Coloration: Skin is not cyanotic or jaundiced.      Nails: There is no clubbing.   Neurological:      General: No focal deficit present.      Mental Status: She is alert and oriented to person, place, and time.      Cranial Nerves: No cranial nerve deficit.   Psychiatric:         Behavior: Behavior normal.       Fluids    Intake/Output Summary (Last 24 hours) at 10/1/2022 1047  Last data filed at 10/1/2022 0700  Gross per 24 hour   Intake 800 ml   Output 1050 ml   Net -250 ml       Laboratory  Recent Labs     09/29/22  0630 09/30/22  0408   WBC 3.7* 3.5*   RBC 3.73* 3.27*   HEMOGLOBIN 11.1* 9.7*   HEMATOCRIT 35.5* 30.5*   MCV 95.2 93.3   MCH 29.8 29.7   MCHC 31.3* 31.8*   RDW 49.7 47.5   PLATELETCT 103* 100*   MPV 9.3 8.5*     Recent Labs     09/29/22  0630 09/30/22  0408   SODIUM 140 139   POTASSIUM 4.6 3.9   CHLORIDE 104 103   CO2 28 25   GLUCOSE 117* 120*   BUN 12 12   CREATININE 0.60 0.57   CALCIUM 9.0 8.6                     Imaging  US-TRAUMA VEIN SCREEN LOWER BILAT EXTREMITY   Final Result      MR-LUMBAR SPINE-W/O   Final Result         Mild lumbar spine degenerative changes without significant spinal canal stenosis.      There is moderate left foraminal narrowing at L5-S1 with impingement upon the exiting left L5 nerve.      CT-HEAD W/O   Final Result      1.  Mildly increased size of the extra axial hemorrhage at the right frontal lobe.   2.  Otherwise similar size of the bilateral lateral holohemispheric extra-axial hematomas. Regional sulcal effacement on the right has mildly worsened.   3.  Layering subarachnoid hemorrhage along the falx and right tentorium likely related to redistribution.   4.   Minimally increased right sylvian fissure subarachnoid hemorrhage.   5.  Basal cisterns are patent.      CT-TSPINE W/O PLUS RECONS   Final Result         1.  No acute traumatic bony injury of the thoracic spine.      CT-LSPINE W/O PLUS RECONS   Final Result         1.  No acute traumatic bony injury of the lumbar spine.   2.  Atherosclerosis      CT-HEAD W/O   Final Result         1.  5.5 mm right mixed density right holohemispheric subdural hematoma   2.  2.2 mm left holohemispheric subdural hematoma.   3.  Right sylvian subarachnoid hemorrhages.   4.  Nonspecific white matter changes, commonly associated with small vessel ischemic disease.  Associated mild cerebral atrophy is noted.      Based solely on CT findings, the brain injury guideline category is mBIG 3.      EDH   IVH   Displaced skull fx   SDH > 8mm   IPH > 8mm or multiple   SAH bi-hemispheric or > 3mm      The original BIG retrospective analysis found radiographic progression in 0% of BIG 1 patients and 2.6% BIG 2.      These findings were discussed with the patient's clinician, Jr Tamez, on 9/28/2022 5:01 AM.      CT-CSPINE WITHOUT PLUS RECONS   Final Result         1.  Multilevel degenerative changes of the cervical spine limit diagnostic sensitivity of this examination, otherwise no acute traumatic bony injury of the cervical spine is apparent.   2.  Atherosclerosis      CT-CHEST,ABDOMEN,PELVIS WITH   Final Result         1.  No significant abnormality in thorax, abdomen and pelvis CT scan.   2.  Atherosclerosis   3.  Pulmonary nodule, see nodule follow-up recommendations below.      Fleischner Society pulmonary nodule recommendations:      Low Risk: No routine follow-up      High Risk: Optional CT at 12 months      Comments: Nodules less than 6 mm do not require routine follow-up, but certain patients at high risk with suspicious nodule morphology, upper lobe location, or both may warrant 12-month follow-up.      Low Risk - Minimal or  absent history of smoking and of other known risk factors.      High Risk - History of smoking or of other known risk factors.      Note: These recommendations do not apply to lung cancer screening, patients with immunosuppression, or patients with known primary cancer.      Fleischner Society 2017 Guidelines for Management of Incidentally Detected Pulmonary Nodules in Adults      DX-CHEST-LIMITED (1 VIEW)   Final Result         1.  No acute cardiopulmonary disease.   2.  Atherosclerosis      DX-PELVIS-1 OR 2 VIEWS   Final Result         1.  No acute traumatic bony injury.           Assessment/Plan  * Subdural hematoma, post-traumatic- (present on admission)  Assessment & Plan  Patient evaluated by trauma service and neurosurgery with recommendation for nonsurgical management  PT OT sania's  SNF referral given recurrent falls  Neurosurgery recommend repeat head CT in 2 weeks    I had a long discussion with the patient regarding the placement.  Post acute is recommended per PT OT.  Physiatry saw the patient, not a candidate for inpatient rehab and recommended SNF for continued therapy and for prevention given recent R hip fracture.  However patient adamantly declined SNF, and wants to go home with home health.  She states she has 24/7 care at home by the .     I talked to the , the  stated himself is not in good shape and do prefer patient to go to SNF.  I told the , he needs to discussed with the patient and make their decision.      Pulmonary nodule- (present on admission)  Assessment & Plan  Outpatient follow-up    History of seizures- (present on admission)  Assessment & Plan  On Keppra and Vimpat    Alcohol use- (present on admission)  Assessment & Plan  Reviewed with patient importance of complete abstinence given history of liver transplant    Scalp laceration, initial encounter- (present on admission)  Assessment & Plan  Staples will need to be removed 7 days from  admission    Frequent falls- (present on admission)  Assessment & Plan  Recurrent falls with ICH  Recent total hip arthroplasty  Reviewed risks of recurrent falls and recommended SNF referral based on PT OT evaluations    Syncope- (present on admission)  Assessment & Plan  Circumstances of her fall are not clear as she has no clear recollection of the event.  Could be related to postconcussion syncope.      Thrombocytopenia (HCC)- (present on admission)  Assessment & Plan  Chronic monitor CBC    Pulmonary hypertension (HCC)- (present on admission)  Assessment & Plan  Last echo found normal RV function    On Cialis and Ambrisentan follows with [facility]  Appears euvolemic    Chronic respiratory failure (HCC)- (present on admission)  Assessment & Plan  On 4 L of O2 which is her baseline    Status post liver transplant Dr. [place] ([facility])- (present on admission)  Assessment & Plan  Continue home dose of mycophenolate and tacrolimus  Reviewed importance of complete abstinence from alcohol       VTE prophylaxis: SCDs/TEDs    I have performed a physical exam and reviewed and updated ROS and Plan today (10/1/2022). In review of yesterday's note (9/30/2022), there are no changes except as documented above.

## 2022-10-02 NOTE — CARE PLAN
The patient is Stable - Low risk of patient condition declining or worsening    Shift Goals  Clinical Goals: q4 neuro; safe mobilization; pain management  Patient Goals: pain control; discharge home  Family Goals: no family present    Progress made toward(s) clinical / shift goals:    Problem: Knowledge Deficit - Standard  Goal: Patient and family/care givers will demonstrate understanding of plan of care, disease process/condition, diagnostic tests and medications  Outcome: Progressing     Problem: Skin Integrity  Goal: Skin integrity is maintained or improved  Outcome: Progressing     Problem: Pain - Standard  Goal: Alleviation of pain or a reduction in pain to the patient’s comfort goal  Outcome: Progressing   Discussed daily POC. Encouraged safe mobilization. Discussed pain management. Medicated per MAR for pain, tolerated well. Discussed discharge planning process.    Patient is not progressing towards the following goals:

## 2022-10-02 NOTE — PROGRESS NOTES
"Pt claimed that she had \"seizure\" today.  She said that she told day RN and MD today but no one believed her.  Pt is anxious and tearful.  RN informed  Jesus LÓPEZ re: this matter.  MARIBEL went in and talked to pt.  MARIBEL ordered one time order for Seroquel.          "

## 2022-10-02 NOTE — PROGRESS NOTES
Hospital Medicine Daily Progress Note    Date of Service  10/2/2022    Chief Complaint  Roxana Cuba is a 62 y.o. female admitted 9/28/2022 with fall and ICH     Hospital Course  62 y.o. female who presented 9/28/2022 with past medical history of liver transplant on immunosuppressive therapy, pulmonary hypertension, chronic hypoxic respiratory failure on 4 L of oxygen, seizures, history of repeated falls, subdural hematoma on 8/2022 who presents to the hospital after a ground-level fall.  Patient states that time she was walking down the hallway where she felt lightheaded and dizzy.  CT scan of the head found a 5.5 mm right holohemispheric subdural hematoma, 2.2 mm left hemispheric subdural hematoma.  Right sylvian subarachnoid hemorrhages.  CT C-spine was negative.  CT chest abdomen pelvis was negative.  Trauma team evaluated patient and determined that she should have a nonoperative management and repeat CT scan in 6 hours.  She did have a scalp laceration stapled in the emergency room.  Neurosurgery did evaluate the patient and stated that she did have a stable bleed.  Repeat CT scan of the head found a mildly increased size of the extra-axial hemorrhage at the right frontal lobe.  The bilateral lateral holohemispheric hematomas are similar in size.      Interval Problem Update  Reported right hip pain, recent hip fracture post surgery  Mild headache.  Denies nausea vomiting or focal weakness.    Scalp laceration - plan for staple removal 10/5   Trauma surgery sign off,   Follow up with Neuro surgeon Dr. Okeefe, repeat CT in 2 wks    Length discussion with the patient, and the  on the phone, the final decision from the patient and the  is discharging to home with home health    I have discussed this patient's plan of care and discharge plan at IDT rounds today with Case Management, Nursing, Nursing leadership, and other members of the IDT team.        I have discussed this patient's plan of care  and discharge plan at IDT rounds today with Case Management, Nursing, Nursing leadership, and other members of the IDT team.    Consultants/Specialty  neurosurgery and trauma surgery    Code Status  Full Code    Disposition  Patient is medically cleared for discharge.   Anticipate discharge to to skilled nursing facility.  I have placed the appropriate orders for post-discharge needs.    Review of Systems  Review of Systems   Constitutional:  Positive for malaise/fatigue.   Cardiovascular:  Negative for chest pain.   Gastrointestinal:  Negative for abdominal pain, nausea and vomiting.   Musculoskeletal:  Positive for falls.   All other systems reviewed and are negative.     Physical Exam  Temp:  [36.7 °C (98 °F)-37.2 °C (98.9 °F)] 36.9 °C (98.4 °F)  Pulse:  [66-77] 77  Resp:  [17-18] 17  BP: (113-127)/(68-80) 115/80  SpO2:  [98 %] 98 %    Physical Exam  Vitals and nursing note reviewed.   Constitutional:       General: She is not in acute distress.  HENT:      Head: Normocephalic.      Nose: Nose normal. No rhinorrhea.      Mouth/Throat:      Pharynx: No oropharyngeal exudate or posterior oropharyngeal erythema.   Eyes:      General:         Right eye: No discharge.         Left eye: No discharge.   Cardiovascular:      Rate and Rhythm: Normal rate and regular rhythm.      Heart sounds: Normal heart sounds.   Pulmonary:      Effort: Pulmonary effort is normal. No respiratory distress.      Breath sounds: Normal breath sounds. No stridor. No wheezing.   Chest:      Chest wall: No tenderness.   Abdominal:      General: There is no distension.      Palpations: Abdomen is soft.      Tenderness: There is no abdominal tenderness. There is no rebound.   Musculoskeletal:         General: No swelling or tenderness.      Cervical back: No rigidity.   Skin:     General: Skin is warm and dry.      Coloration: Skin is not cyanotic or jaundiced.      Nails: There is no clubbing.   Neurological:      General: No focal deficit  present.      Mental Status: She is alert and oriented to person, place, and time.      Cranial Nerves: No cranial nerve deficit.   Psychiatric:         Behavior: Behavior normal.       Fluids    Intake/Output Summary (Last 24 hours) at 10/2/2022 1214  Last data filed at 10/2/2022 0801  Gross per 24 hour   Intake 650 ml   Output --   Net 650 ml       Laboratory  Recent Labs     09/30/22  0408   WBC 3.5*   RBC 3.27*   HEMOGLOBIN 9.7*   HEMATOCRIT 30.5*   MCV 93.3   MCH 29.7   MCHC 31.8*   RDW 47.5   PLATELETCT 100*   MPV 8.5*     Recent Labs     09/30/22  0408   SODIUM 139   POTASSIUM 3.9   CHLORIDE 103   CO2 25   GLUCOSE 120*   BUN 12   CREATININE 0.57   CALCIUM 8.6                     Imaging  US-TRAUMA VEIN SCREEN LOWER BILAT EXTREMITY   Final Result      MR-LUMBAR SPINE-W/O   Final Result         Mild lumbar spine degenerative changes without significant spinal canal stenosis.      There is moderate left foraminal narrowing at L5-S1 with impingement upon the exiting left L5 nerve.      CT-HEAD W/O   Final Result      1.  Mildly increased size of the extra axial hemorrhage at the right frontal lobe.   2.  Otherwise similar size of the bilateral lateral holohemispheric extra-axial hematomas. Regional sulcal effacement on the right has mildly worsened.   3.  Layering subarachnoid hemorrhage along the falx and right tentorium likely related to redistribution.   4.  Minimally increased right sylvian fissure subarachnoid hemorrhage.   5.  Basal cisterns are patent.      CT-TSPINE W/O PLUS RECONS   Final Result         1.  No acute traumatic bony injury of the thoracic spine.      CT-LSPINE W/O PLUS RECONS   Final Result         1.  No acute traumatic bony injury of the lumbar spine.   2.  Atherosclerosis      CT-HEAD W/O   Final Result         1.  5.5 mm right mixed density right holohemispheric subdural hematoma   2.  2.2 mm left holohemispheric subdural hematoma.   3.  Right sylvian subarachnoid hemorrhages.   4.   Nonspecific white matter changes, commonly associated with small vessel ischemic disease.  Associated mild cerebral atrophy is noted.      Based solely on CT findings, the brain injury guideline category is mBIG 3.      EDH   IVH   Displaced skull fx   SDH > 8mm   IPH > 8mm or multiple   SAH bi-hemispheric or > 3mm      The original BIG retrospective analysis found radiographic progression in 0% of BIG 1 patients and 2.6% BIG 2.      These findings were discussed with the patient's clinician, Jr Tamez, on 9/28/2022 5:01 AM.      CT-CSPINE WITHOUT PLUS RECONS   Final Result         1.  Multilevel degenerative changes of the cervical spine limit diagnostic sensitivity of this examination, otherwise no acute traumatic bony injury of the cervical spine is apparent.   2.  Atherosclerosis      CT-CHEST,ABDOMEN,PELVIS WITH   Final Result         1.  No significant abnormality in thorax, abdomen and pelvis CT scan.   2.  Atherosclerosis   3.  Pulmonary nodule, see nodule follow-up recommendations below.      Fleischner Society pulmonary nodule recommendations:      Low Risk: No routine follow-up      High Risk: Optional CT at 12 months      Comments: Nodules less than 6 mm do not require routine follow-up, but certain patients at high risk with suspicious nodule morphology, upper lobe location, or both may warrant 12-month follow-up.      Low Risk - Minimal or absent history of smoking and of other known risk factors.      High Risk - History of smoking or of other known risk factors.      Note: These recommendations do not apply to lung cancer screening, patients with immunosuppression, or patients with known primary cancer.      Fleischner Society 2017 Guidelines for Management of Incidentally Detected Pulmonary Nodules in Adults      DX-CHEST-LIMITED (1 VIEW)   Final Result         1.  No acute cardiopulmonary disease.   2.  Atherosclerosis      DX-PELVIS-1 OR 2 VIEWS   Final Result         1.  No acute  traumatic bony injury.           Assessment/Plan  * Subdural hematoma, post-traumatic- (present on admission)  Assessment & Plan  Patient evaluated by trauma service and neurosurgery with recommendation for nonsurgical management  PT OT sania's  SNF referral given recurrent falls  Neurosurgery recommend repeat head CT in 2 weeks    10/1 I had a long discussion with the patient regarding the placement.  Post acute is recommended per PT OT.  Physiatry saw the patient, not a candidate for inpatient rehab and recommended SNF for continued therapy and for prevention given recent R hip fracture.  However patient adamantly declined SNF, and wants to go home with home health.  She states she has 24/7 care at home by the .     I talked to the , the  stated himself is not in good shape and do prefer patient to go to SNF.  I told the , he needs to discussed with the patient and make their decision.      10/2 Length discussion with the patient, and the  on the phone, the final decision from the patient and the  is discharging to home with home health    Pulmonary nodule- (present on admission)  Assessment & Plan  Outpatient follow-up    History of seizures- (present on admission)  Assessment & Plan  On Keppra and Vimpat    Alcohol use- (present on admission)  Assessment & Plan  Reviewed with patient importance of complete abstinence given history of liver transplant    Scalp laceration, initial encounter- (present on admission)  Assessment & Plan  Staples will need to be removed 7 days from admission    Frequent falls- (present on admission)  Assessment & Plan  Recurrent falls with ICH  Recent total hip arthroplasty  Reviewed risks of recurrent falls and recommended SNF referral based on PT OT evaluations    Syncope- (present on admission)  Assessment & Plan  Circumstances of her fall are not clear as she has no clear recollection of the event.  Could be related to postconcussion  syncope.      Thrombocytopenia (HCC)- (present on admission)  Assessment & Plan  Chronic monitor CBC    Pulmonary hypertension (HCC)- (present on admission)  Assessment & Plan  Last echo found normal RV function    On Cialis and Ambrisentan follows with [facility]  Appears euvolemic    Chronic respiratory failure (HCC)- (present on admission)  Assessment & Plan  On 4 L of O2 which is her baseline    Status post liver transplant Dr. [place] ([facility])- (present on admission)  Assessment & Plan  Continue home dose of mycophenolate and tacrolimus  Reviewed importance of complete abstinence from alcohol       VTE prophylaxis: SCDs/TEDs    I have performed a physical exam and reviewed and updated ROS and Plan today (10/2/2022). In review of yesterday's note (10/1/2022), there are no changes except as documented above.

## 2022-10-02 NOTE — PROGRESS NOTES
Assumed care of patient at bedside report from NOC RN. Updated on POC. Patient currently A & O x 3, disoriented to time; on 2 L O2 nasal cannula; up x2 assist with FWW, declining mobilization at this time; with complaints of 6-7/10 abdominal discomfort, medicated per MAR for pain. Assessment completed. Call light within reach. Whiteboard updated. Fall precautions in place. Bed locked and in lowest position. All questions answered. No other needs indicated at this time.

## 2022-10-02 NOTE — CARE PLAN
The patient is Watcher - Medium risk of patient condition declining or worsening    Shift Goals  Clinical Goals: Safety  Patient Goals: Pain Control  Family Goals: N/A    Progress made toward(s) clinical / shift goals:  Pt doesn't call to get OOB at times.  Pt has hx of fall.  Continues to have bed alarm and needs a desk bed.      Problem: Fall Risk  Goal: Patient will remain free from falls  Outcome: Progressing       Patient is not progressing towards the following goals:

## 2022-10-02 NOTE — PROGRESS NOTES
Pt c/o HA and right hip pain at 10/10.  PRN Oxy given at 21 but pt claimed that it's didn't help her at all.  Pt claimed that pain is still 10/10.  Jesus LÓPEZ notified via [employer].  She ordered one time order for Tramadol.

## 2022-10-02 NOTE — PROGRESS NOTES
Jesus LÓPEZ changed order to [facility].  Asked pt if she is willing to try Norco since tylenol is showing as allergy.  Pt stated that she avoid tylenol d/t liver transplant.  Norco given.

## 2022-10-03 NOTE — DOCUMENTATION QUERY
[facility]                                                                       Query Response Note      PATIENT:               VICK LI  ACCT #:                  [id number]  MRN:                     [id number]  :                      1960  ADMIT DATE:       2022 3:45 AM  DISCH DATE:          RESPONDING  PROVIDER #:        [id number]           QUERY TEXT:    63 y/o female suffered a mechanical ground-level fall with brief loss of consciousness and admitted for subdural hematoma.  Per  Dietary PN -  BMI 18.39; Malnutrition Risk: Pt with severe malnutrition in context of chronic illness as evidenced by severe muscle wasting at temples, recent 16% weight loss x 6 months.    Treatments include: dietary consult; Chobani smoothies BID; document intake of all meals; monitor weight.  Based on the clinical indicators, can a diagnosis be made?    NOTE- If an appropriate response is not listed below, please respond with a new note.              The patient's clinical indicators include:   Dietary PN -  BMI 18.39; Malnutrition Risk: Pt with severe malnutrition in context of chronic illness as evidenced by severe muscle wasting at temples, recent 16% weight loss x 6 months.    Treatment - Dietary consult; Chobani smoothies BID; document intake of all meals; monitor weight    Risk factors - BMI 18.39    Thank you,  Rowena ALLRED  Clinical   Connect via [employer]  Options provided:   -- Severe malnutrition in the context of chronic illness is ruled in   -- Findings of no clinical significance   -- Other explanation, (please specify the other explanation)   -- Unable to determine      Query created by: Rowena Dee on 10/3/2022 10:50 AM    RESPONSE TEXT:    Severe malnutrition in the context of chronic illness is ruled in          Electronically signed by:  IRAM BORDEN MD 10/3/2022 11:25 AM

## 2022-10-03 NOTE — FACE TO FACE
Face to Face Note  -  Durable Medical Equipment    Monse Ac M.D. - NPI: [id number]  I certify that this patient is under my care and that they had a durable medical equipment(DME)face to face encounter by myself that meets the physician DME face-to-face encounter requirements with this patient on:    Date of encounter:   Patient:                    MRN:                       YOB: 2022  Roxana Cuba  [id number]  1960     The encounter with the patient was in whole, or in part, for the following medical condition, which is the primary reason for durable medical equipment:  Other - SDH    I certify that, based on my findings, the following durable medical equipment is medically necessary:    Beds.    My Clinical findings support the need for the above equipment due to:  Frequent Falls, Other - SDH, , immunosuppressant post liver transplantation, multiple admission due to complications after frequent falls

## 2022-10-03 NOTE — THERAPY
Occupational Therapy  Daily Treatment     Patient Name: Roxana Cuba  Age:  62 y.o., Sex:  female  Medical Record #: [id number]  Today's Date: 10/3/2022       Precautions: Fall Risk, Swallow Precautions ( See Comments), Weight Bearing As Tolerated Right Lower Extremity, Posterior Hip Precautions  Comments: recent R SHIMA    Assessment    Pt seen for OT tx. Completed toileting, toilet transfer and standing at the sink to groom w/ supv. Pt reports her SO will be home to assist w/ LB dressing as needed. Amb w/ FWW in room w/ supv. Encouraged pt to mobilize w/ nrsg staff > BR as tolerated.     Plan    Continue current treatment plan.    DC Equipment Recommendations: None  Discharge Recommendations: Other - Per initial eval OT recommended placement, however pt is stating that her SO is home with her and able to assist w/ ADLs/ADL txfs as needed. Should pt's  feel comfortable taking her home at current level recommend home w/  support.        10/03/22 0931   Balance   Sitting Balance (Static) Fair +   Sitting Balance (Dynamic) Fair +   Standing Balance (Static) Fair   Standing Balance (Dynamic) Fair -   Weight Shift Sitting Fair   Weight Shift Standing Fair   Bed Mobility    Supine to Sit Supervised   Sit to Supine Supervised   Activities of Daily Living   Grooming Standing;Standby Assist   Upper Body Dressing Supervision   Lower Body Dressing   (reports her SO will assist)   Toileting Supervision   Functional Mobility   Sit to Stand Supervised   Bed, Chair, Wheelchair Transfer Supervised   Toilet Transfers Supervised   Short Term Goals   Short Term Goal # 1 Pt will demo LB dressing min A   Goal Outcome # 1 Goal not met   Short Term Goal # 2 Pt will tolerate 5 min standing G/H SBA   Goal Outcome # 2 Goal met   Short Term Goal # 3 Pt will complete toileting SPV   Goal Outcome # 3 Goal met   Anticipated Discharge Equipment and Recommendations   DC Equipment Recommendations None   Discharge Recommendations Other  -

## 2022-10-03 NOTE — DISCHARGE PLANNING
ATTN: Case Management  RE: Referral for Home Health    Reason for referral denial: Patient does not meet criteria. Non-compliant.              Unfortunately, we are not able to accept this referral for the reason listed above. If further clarity is needed, our Transitional Care Specialists are available to discuss any barriers to service at [phone].      We look forward to collaborating with you in the future,  [facility]

## 2022-10-03 NOTE — THERAPY
Speech Language Pathology  Daily Treatment     Patient Name: Roxana Cuba  Age:  62 y.o., Sex:  female  Medical Record #: [id number]  Today's Date: 10/3/2022     Precautions  Precautions: (P) Fall Risk, Weight Bearing As Tolerated Right Lower Extremity, Posterior Hip Precautions  Comments: recent R SHIMA    Assessment    Pt seen this date for dysphagia intervention. Pt able to recall all reflux precautions (upright during and after meals, avoid acidic foods, slow rate, small bites, alternate liquids/solids) independently. Pt elevated her HOB to 90* and demonstrated implementation of all reflux strategies independently. No s/sx of aspiration or reflux appreciated.    Recommend continuation of reg/thin diet with adherence to reflux precautions.     SLP is no longer actively following for dysphagia intervention, but will continue to follow to complete cognitive-linguistic evaluation.     Plan    Discharge secondary to goals met.    Discharge Recommendations: (P) Anticipate that the patient will have no further speech therapy needs after discharge from the hospital    Subjective    Pt alert, followed directions and participated fully.      Objective       10/03/22 1106   Charge Group   SLP Swallowing Dysfunction Treatment Swallowing Dysfunction Treatment   Total Treatment Time   SLP Time Spent Yes   SLP Swallowing Dysfunction Treatment (Mins) 9   Precautions   Precautions Fall Risk;Weight Bearing As Tolerated Right Lower Extremity;Posterior Hip Precautions   Vitals   O2 (LPM) 3   O2 Delivery Device Nasal Cannula   Pain 0 - 10 Group   Therapist Pain Assessment Post Activity Pain Same as Prior to Activity;Nurse Notified;0   Dysphagia    Dysphagia X   Positioning / Behavior Modification Self Monitoring;Modulate Rate or Bite Size   Nutritional Liquid Intake Rating Scale Non thickened beverages   Nutritional Food Intake Rating Scale Total oral diet with no restrictions   Skilled Intervention Verbal Cueing;Compensatory  "Strategies   Recommended Route of Medication Administration   Medication Administration  Whole with Liquid Wash   Patient / Family Goals   Patient / Family Goal #1 \"I don't remember\" crying, to most orientation questions   Goal #1 Outcome Goal met   Short Term Goals   Short Term Goal # 1 Patient will consume regular solids/thin liquids without overt indicators of aspiration or decline in pulmonary status with implementation of reflux precautions   Goal Outcome # 1 Goal met   Education Group   Education Provided Dysphagia   Dysphagia Patient Response Patient;Acceptance;Explanation;Demonstration;Action Demonstration;Verbal Demonstration   Anticipated Discharge Needs   Discharge Recommendations Anticipate that the patient will have no further speech therapy needs after discharge from the hospital   Therapy Recommendations Upon DC Not Indicated   Interdisciplinary Plan of Care Collaboration   IDT Collaboration with  Nursing   Patient Position at End of Therapy Seated;In Bed;Bed Alarm On;Tray Table within Reach;Call Light within Reach   Collaboration Comments RN aware of results/recs     "

## 2022-10-03 NOTE — CARE PLAN
The patient is Stable - Low risk of patient condition declining or worsening    Shift Goals  Clinical Goals: pain management, q4 neuro check  Patient Goals: control pain, discharge  Family Goals: sarah    Progress made toward(s) clinical / shift goals:  patient's pain hip pain being addressed every 3 hours/     Patient is not progressing towards the following goals:

## 2022-10-03 NOTE — PROGRESS NOTES
Patient complaining of hip pain throughout NOC shift of 10/10.  Oxycodone prn pain medication given every 3 hours. Patient also complained of anxiety, prn clonazepam given 1 dose.

## 2022-10-03 NOTE — DISCHARGE PLANNING
TCN following. HTH/SCP chart review completed. Per chart review, PT and OT with recs for post-acute placement.  Patient has been accepted to [facility] at time of writing this note. [facility].  Pending [facility], if patient accepted, family would have to bring in medication to SNF.  [facility] declined. Patient is medically cleared for discharge. Anticipate discharge to to skilled nursing facility tomorrow pending bed availability.     TCN will continue to follow and collaborate with discharge planning team as additional post acute needs arise. Thank you.      Previously completed:  - Choice forms: SNF   - [facility] referral not sent; anticipating SNF placement      1630- Per Dr. Ac, patient and spouse have now decided to discharge home with HH.  Home health FTF completed.  TCN to obtain HH choice form tomorrow.

## 2022-10-03 NOTE — DISCHARGE PLANNING
ATTN: Case Management  RE: Referral for Home Health    Reason for referral denial: Patient does not meet criteria as she is non-compliant              Unfortunately, we are not able to accept this referral for the reason listed above. If further clarity is needed, our Transitional Care Specialists are available to discuss any barriers to service at [phone].      We look forward to collaborating with you in the future,  [facility]

## 2022-10-03 NOTE — THERAPY
"Speech Language Pathology   Initial Assessment     Patient Name: Roxana Cuba  AGE:  62 y.o., SEX:  female  Medical Record #: [id number]  Today's Date: 10/3/2022     Precautions  Precautions: Fall Risk, Posterior Hip Precautions, Weight Bearing As Tolerated Right Lower Extremity  Comments: recent R SHIMA    Assessment:    Patient was seen on this date for a cognitive-linguistic evaluation 2/2 GLF and SDH.. Pt reports she is retired due to disability, reports she was the  at [employer], she \"relinquished\" her 's license due to seizures, her spouse manages her medications and fills out her pillbox, and she manages finances but has her spouse double check them. Pt reports she uses a written calendar and an online calendar, reports this is where issues arise. Clinician suggested using one form of calendar to reduce confusion and opportunities for missing appointment/double booking, pt stated she will ask her spouse.     Portions of the COGNISTAT (Neurobehavioral Cognitive Status Assessment) were administered in conjunction with non-standardized assessments.     Patient scored the following on the Cognistat:  Orientation: WNL  Attention: Mild  Comprehension (Language):WNL  Repetition (Language):WNL  Naming (Language):WNL  Memory:Moderate  Calculations:WNL  Similarities (Reasoning):WNL  Judgment (Reasoning):WNL      Strengths include verbal expression, calculations, reasoning, basic problem solving, and long term memory. Weaknesses include immediate memory and short term memory.     Pt reporting increased difficulty with memory since onset of seizures ~6 months ago. When asked to provide examples of how it affects her throughout the day, most examples she gave were actually related to mistakes her spouse makes and not related to the patient.     Pt presenting with a mild cognitive-linguistic impairment characterized by deficits in immediate and short term memory. Suspect these are baseline. Pt may benefit from further " intervention in the home health setting should deficits worsen or persist.     Supervision Needs Upon Discharge: The pt will benefit from intermittent assistance for the following IADLs: Medication management, Financial management, Appointment management, Cooking.       Of note, cognitive-linguistic evaluations assess performance on various cognitive-linguistic domains such as expressive and receptive language, attention, memory, executive function, problem solving, etc. It is not within the scope of Speech Language Pathologists to determine capacity, please defer to medical team or psych for capacity evaluation. Thank you.       Plan    Recommend Speech Therapy for Evaluation only for the following treatments:  Cognitive-Linguistic Training.    Discharge Recommendations: Recommend home health for continued speech therapy services       Objective       10/03/22 1128   Charge Group   SLP Speech Language Evaluation Speech Sound Language Comprehension   Total Treatment Time   SLP Time Spent Yes   SLP Speech Language Evaluation (Mins) 21   SLP Total Time Spent 21   Initial Contact Note    Initial Contact Note  Order Received and Verified, Evaluation Only - Patient Does Not Require Further Acute Speech Therapy at this Time.  However, May Benefit from Post Acute Therapy for Higher Level Functional Deficits.   Precautions   Precautions Fall Risk;Posterior Hip Precautions;Weight Bearing As Tolerated Right Lower Extremity   Comments recent R SHIMA   Vitals   O2 (LPM) 3   O2 Delivery Device Nasal Cannula   Pain 0 - 10 Group   Therapist Pain Assessment Post Activity Pain Same as Prior to Activity;0;Nurse Notified   Prior Living Situation   Prior Services None   Housing / Facility 1 Story House   Steps Into Home 1   Steps In Home 14   Bathroom Set up Bathtub / Shower Combination  (sponge baths)   Equipment Owned Front-Wheel Walker;Wheelchair   Lives with - Patient's Self Care Capacity Spouse   Prior Level Of Function    Communication Within Functional Limits   Swallow Within Functional Limits   Hearing Within Functional Limits for Evaluation   Vision Within Functional Limits for Evaluation   Patient's Primary Language English   Occupation (Pre-Hospital Vocational) Retired Due To Disability   Verbal Expression   Verbal Expression / Aphasia Eval (WDL) WDL   Auditory Comprehension   Auditory Comprehension (WDL) WDL   Reading Comprehension   Reading Comprehension (WDL) WDL   Cognitive-Linguistic   Cognitive-Linguistic (WDL) X   Level of Consciousness Alert   Orientation Level Oriented x 4   Short Term Memory Moderate (3)   Immediate Memory Supervision (5)   Long Term Memory / Reminiscing Within Functional Limits (6-7)   Simple Reasoning / Problem Solving Within Functional Limits (6-7)   Insight into Deficits Supervision (5)   Auditory Math Within Functional Limits (6-7)   Medication Management  Within Functional Limits (6-7)  ( fills out pillbox)   Skilled Intervention Compensatory Strategies;Verbal Cueing   Education Group   Education Provided Traumatic Brain Injury / Cognitive-Linguistic   TBI / Cog-Ling Patient Response Patient;Acceptance;Demonstration;Explanation;Verbal Demonstration   Problem List   Problem List Cognitive-Linguistic Deficits   Anticipated Discharge Needs   Discharge Recommendations Recommend home health for continued speech therapy services   Interdisciplinary Plan of Care Collaboration   IDT Collaboration with  Nursing   Patient Position at End of Therapy In Bed;Seated;Bed Alarm On;Call Light within Reach;Tray Table within Reach   Collaboration Comments RN aware of results/recs

## 2022-10-03 NOTE — DISCHARGE PLANNING
"Agency/Facility Name: [facility]  Spoke To: Sidney  Outcome: DPA asked if [facility] delivers semi-electric beds. Sidney says they do, however they will not have adequate staff for delivery for another week.     RN CM notified.    1010-  Agency/Facility Name: [facility]  Spoke To: Rashi   Outcome: DPA called to confirm if [facility] would have enough staff for one week, informed that [facility] might have adequate staff for delivery in a week, maybe two. [facility] is also unsure about  staffing.     RN CM notified.     1021-  [facility]ency[facility]Facility Name: [facility]  Spoke To: Tabitha   Outcome: DPA confirmed that [facility] has semi-electric beds in stock. DPA confirmed what is needed in Pt's chart to qualify for bed: 1) Chart notes need to include why the Pt needs to be elevated 30 degrees (including Pt's condition), and why the Pt needs frequent repositioning, 2) the prescription needs to say \"semi-electric bed\", and needs to include the length of need).     RN CM notified.     1123-  Received Choice form at 1001  Agency/Facility Name: [facility]  Referral sent per Choice form @ 1123    1258-  [facility]Facility Name: [facility]  Spoke To: Kesha  Outcome: DPA requested update on referral, informed that Pt is declined for being non-compliant.     RN CM notified.    1301-  Agency/Facility Name: [facility]   Referral sent per Choice form @ 1301    RN CM notified.      "

## 2022-10-03 NOTE — DISCHARGE PLANNING
ATTN: Case Management  RE: Referral for Home Health    Reason for referral denial: ***              Unfortunately, we are not able to accept this referral for the reason listed above. If further clarity is needed, our Transitional Care Specialists are available to discuss any barriers to service at [phone].      We look forward to collaborating with you in the future,  [facility]

## 2022-10-03 NOTE — DISCHARGE PLANNING
CM followed up with [facility] & notified per Michelle patient has been accepted for resumption of care. CM notified bed side staff nurse.

## 2022-10-03 NOTE — DISCHARGE PLANNING
"HTH/SCP TCN chart review completed. Collaborated with SEGUNDO Hall.     TCN met with patient at bedside. Patient verbalized that her choice is now to return home with support from her . With return home, patient verbalized likely need for a hospital bed upon return home. As such, Choice obtained for [facility] for hospital bed request and given to CM. Patient verbalized oxygen at home through [facility]. TCN also discussed [facility] services with patient amendable to referral. [facility] referral sent. Discussed possible need for direct admission to SNF given patient stated she was \"on the fence\" about her decision to return home vs SNF. Patient stated she was amendable to further discussions should this need to be an option upon return home.     Collaborated with SEGUNDO following visit with patient. CM to follow with  to verify patient address as patient stated at bedside she was unable to verify address \"we just moved.\" CM to also follow regarding  ability to bring tank to ensure oxygen needs are met at time of dc given patient is followed by [facility] at home.    TCN will continue to follow and collaborate with discharge planning team as additional post acute needs arise. Thank you.    Completed today:  Choice forms obtained: [facility]  [facility] referral sent today 10/3/2022 as patient dc plan is now home with     Previously completed:  - Choice forms: SNF    "

## 2022-10-03 NOTE — DISCHARGE PLANNING
This referral has been escalated to a Clinical Supervisor for review in order to determine Home Health appropriateness.  This issue will be resolved as quickly as possible, but for any questions feel free to call us at [phone].  Thank you!

## 2022-10-03 NOTE — DISCHARGE INSTRUCTIONS
- Follow up with primary care physician in 1 week.     - Scalp laceration - plan for staple removal 10/5 /22    - Follow up with Neuro surgeon Dr. Okeefe, repeat CT in 1 wks    - 5 mm right upper lobe pulmonary nodule - recommend Outpatient follow-up, repeat CT chest in 6-12 mo    - Please take the medications as instructed    - Go to the local Emergency Department if you have any worsening condition.

## 2022-10-04 NOTE — DISCHARGE PLANNING
Received Choice form at 1001 (10/3)  Agency/Facility Name: [facility]  Referral sent per Choice form @ 0900  CM team was waiting on updated chart notes required for DME order at [facility]. After discussion, a decision was made to send DME order to [facility].     1015-  Agency/Facility Name: [facility]  Spoke To: Rashi  Outcome: [facility] called about DME order. DPA informed [facility] that due to a setback on the notes being properly updated for qualification at [facility], the CM team decided the order should be sent to [facility], with the understanding that they would not have adequate staffing to deliver equipment until one or two weeks. [facility] informed DPA they are also waiting for bed railing to arrive at their facility, and are expecting it to arrive either this week or next week. [facility] asked when Pt will DC, DPA informed them the Pt discharged yesterday. DPA to follow up as needed and will update other DPAs assigned to floor. DPA to martha Pt as accepted.     RN CM notified.

## 2022-10-04 NOTE — PROGRESS NOTES
Chief Complaint   Patient presents with    Transitional Care Management Hospital Follow-up         HPI  Roxana is a 62-year-old female that is here for transitional care management after discharge from the hospital.  She was admitted on 9/28/2022 and discharged on 10/3/2022.  She has multiple hospitalizations and ER visits in the recent past, mostly due to falls.  This last time that she did fall she ended up with bilateral subdural hematomas.  She does have a follow-up scheduled with Dr. Okeefe who is the neurosurgeon as well as her regular neurologist Dr. Mc.  She had a very difficult hospitalization and is very motional and stressed out about everything.  She reports that she denied or declined to go to a skilled nursing facility because she thinks that they are dirty and she would not get enough care there.  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  Needless to say her past medical history is quite complicated.  She reports that she is just hurting all over right now.  She is little bit of a headache but mostly body aches and pains.  She recently had her hip replaced and has been unable to be very active.  She does have PT/OT/speech therapy coming to the house for home health care.  She thinks that this recent fall was related to somehow her Keppra dose being lowered inadvertently.  She is now back on her normal dose of all of her medications aside from her pain meds.      Past medical, surgical, family, and social history is reviewed and updated in Epic chart by me today.   Medications and allergies reviewed and updated in Epic chart by me today.     ROS:   As documented in history of present illness above    Exam:  BP (!) 80/50 (BP Location: Left arm, Patient Position: Sitting, BP Cuff Size: Adult)   Pulse 73    "Temp (!) 22.8 °C (73 °F)   Resp 12   Ht 1.753 m (5' 9\")   Wt 53.1 kg (117 lb)   SpO2 99%   Constitutional: Alert, plus 3 vital signs  Skin:  Warm, dry, no rashes invisible areas.  4 staples removed from her right parietal area of her scalp from this most recent fall.  No infection is appreciated.  Eye: Equal, round and reactive, conjunctiva clear  ENMT: Lips without lesions, good dentition, oropharynx clear    Neck: Trachea midline, no masses, no thyromegaly  Respiratory: Unlabored respiration, lungs clear to auscultation, no wheezes, no rhonchi  Cardiovascular: Normal rate and rhythm, no murmur, no edema  Abdomen: Soft, nontender, no masses or hepatosplenomegaly  Psych: Alert, pleasant, well-groomed, normal affect    Assessment / Plan / Medical Decision makin. Chronic pain syndrome  Try to get her back on her usual dose of pain medications.  She has been on these for quite some time and I do worry more about withdrawal than overdosing on her pain meds at this point.  - morphine ER (MS CONTIN) 15 MG Tab CR tablet; Take 1 Tablet by mouth every 12 hours for 30 days.  Dispense: 60 Tablet; Refill: 0  - oxyCODONE immediate release (ROXICODONE) 10 MG immediate release tablet; Take 1 Tablet by mouth in the morning, at noon, and at bedtime for 30 days.  Dispense: 90 Tablet; Refill: 0    2. SDH (subdural hematoma)  Did discuss care of her subdural hematoma.  Staples are removed today.  Encouraged to follow-up with CT scan and with Dr. Okeefe in clinic as planned.  She does have her PT/OT/speech therapy coming to the house for home care as well.    3. Seizure disorder (HCC)  Discussed that she is back on her normal dose of her medications at this point.  No changes to make for this.    4. Encounter for staple removal        With chronic conditions that she has any med medications that she is on she is a fairly complex patient in general.  Lace plus score is high.   Had like to see her back in clinic in a month to " make sure that she is doing okay.  We did discuss the importance of trying to reduce fall risk.  She does have appropriate wheelchair/walkers and also furniture is adjusted at the home to make these easier to use.  She  has a bedside commode as well.

## 2022-10-04 NOTE — PROGRESS NOTES
Pt discharged. Explained to pt about follow up appointments and medication. Pt stated she understood discharge. Home health accepted pt. Medication picked up from pharmacy and delivered to pt. (9 OXY witnesses by Tabitha) RN. IV removed. Pt taken to husbands vehicle by wheelchair

## 2022-10-04 NOTE — CARE PLAN
The patient is Stable - Low risk of patient condition declining or worsening    Shift Goals  Clinical Goals: safety  Patient Goals: control pain, discharge  Family Goals: sarah    Progress made toward(s) clinical / shift goals:      Problem: Knowledge Deficit - Standard  Goal: Patient and family/care givers will demonstrate understanding of plan of care, disease process/condition, diagnostic tests and medications  Outcome: Progressing     Problem: Skin Integrity  Goal: Skin integrity is maintained or improved  Outcome: Progressing     Problem: Fall Risk  Goal: Patient will remain free from falls  Outcome: Progressing     Problem: Pain - Standard  Goal: Alleviation of pain or a reduction in pain to the patient’s comfort goal  Outcome: Progressing       Patient is not progressing towards the following goals:NA

## 2022-10-05 NOTE — TELEPHONE ENCOUNTER
"Home Health paperwork received from [facility] requiring provider signature.     All appropriate fields completed by Medical Assistant: N/A CMA printed and distributed to MA    Paperwork placed in \"MA to Provider\" folder/basket. Awaiting provider completion/signature.    "

## 2022-10-06 NOTE — TELEPHONE ENCOUNTER
"Home Health paperwork received from [facility] requiring provider signature.     All appropriate fields completed by Medical Assistant: N/A CMA printed and distributed to MA    Paperwork placed in \"MA to Provider\" folder/basket. Awaiting provider completion/signature.    "

## 2022-10-10 NOTE — DISCHARGE INSTRUCTIONS
The bleeding in your brain is decreased in size and there are no findings of new injury.  As we discussed we did recommend placement in a rehabilitation facility so if you change your mind please feel free to return at any time

## 2022-10-10 NOTE — ED PROVIDER NOTES
ED Provider Note    ER PROVIDER NOTE    CHIEF COMPLAINT  Chief Complaint   Patient presents with    Headache     Pt endorses a headache x 3 weeks. Pt states it is keeping her from sleeping. Pt bib EMS from home. Pt denies any trauma, pt does not take blood thinners.        HPI  Roxana Cuba is a 62 y.o. female who presents to the emergency department complaining of headache.  Patient reports that she has had a headache over the last 3 weeks.  However she thinks maybe it is worse over the last few days, she is not exactly sure.  She was recently admitted for subdural and subarachnoid although she reports no falls or injuries since she has been home.  She reports no neck pain.  No nausea or vomiting.  No other areas of pain.  No focal weakness or numbness.  No abdominal pain.  No fevers or chills or cough or congestion    REVIEW OF SYSTEMS  Pertinent positives include headache. Pertinent negatives include no vomiting. See HPI for details. All other systems reviewed and are negative.    PAST MEDICAL HISTORY   has a past medical history of Anemia, Cardiomegaly, Cataract, Chronic obstructive pulmonary disease (HCC), Cirrhosis ([facility]) (12/2011), CKD (chronic kidney disease) stage 3, GFR 30-59 ml/min ([facility]), Diabetes (HCC), Emphysema of lung (HCC), Fracture of left foot, GERD (gastroesophageal reflux disease), H/O Clostridium difficile infection (02/17/2017), Heart burn, Hemorrhagic disorder ([facility]), Hiatus hernia syndrome, High cholesterol, Hypertension, Hypothyroid, Indigestion, Jaundice (2009), Liver transplanted ([facility]), Low back pain (02/17/2017), Memory difficulty, Mild aortic regurgitation and aortic valve sclerosis ( ), On home oxygen therapy, Platelet disorder ([facility]), Pneumonia, Psychiatric disorder, Pulmonary hypertension (HCC), S/P cholecystectomy, Seizure ([facility]) (05/29/2022), Shoulder pain, Small bowel obstruction ([facility]), Splenomegaly, and VRE (vancomycin-resistant Enterococci).    SURGICAL HISTORY   has a past  surgical history that includes anesth,nose,sinus surgery; makayla by laparoscopy (09/19/2009); exam under anesthesia (11/11/2009); hysterectomy, total abdominal; gastroscopy-endo (03/12/2010); bronchoscopy-endo (05/29/2012); bronchoscopy-endo (06/19/2012); sigmoidoscopy flex (09/26/2012); recovery (02/27/2013); bronchoscopy-endo (11/15/2013); recovery (01/21/2014); recovery (03/24/2014); hemorrhoidectomy (11/11/2009); gastroscopy (09/28/2012); carpal tunnel release; bone marrow aspiration (12/31/2012); bone marrow biopsy, ndl/trocar (12/31/2012); recovery (03/31/2014); other abdominal surgery (12/2011); bone marrow aspiration (03/16/2015); bone marrow biopsy, ndl/trocar (03/16/2015); lung biopsy open (11/2016); tonsillectomy; other abdominal surgery (12/2015); breast biopsy (Left, 02/21/2017); colonoscopy (N/A, 03/27/2017); gastroscopy (N/A, 03/27/2017); gastric bypass laparoscopic (2018); hernia repair; makayla by laparoscopy; mammoplasty reduction; panniculectomy (12/11/2017); other surgical procedure; turbinate reduction (Bilateral, 10/04/2018); nasal reconstruction (Bilateral, 10/04/2018); ventral hernia repair robotic xi (N/A, 11/12/2018); craniotomy (Left, 08/04/2021); upper gi endoscopy,diagnosis (N/A, 02/03/2022); colonoscopy,diagnostic (N/A, 6/21/2022); upper gi endoscopy,diagnosis (N/A, 6/21/2022); and partial hip replacement (Right, 8/28/2022).    FAMILY HISTORY  Family History   Problem Relation Age of Onset    Other Father         Unknown (dead 10 years)    Diabetes Father     Heart Disease Father     Hypertension Father     Hyperlipidemia Father     Stroke Father     Non-contributory Mother     Hyperlipidemia Mother     Alcohol/Drug Mother     Cancer Paternal Aunt     Alcohol/Drug Maternal Grandmother     Alcohol/Drug Maternal Grandfather        SOCIAL HISTORY  Social History     Socioeconomic History    Marital status:    Tobacco Use    Smoking status: Never    Smokeless tobacco: Never    Tobacco  "comments:     avoid all tobacco products   Vaping Use    Vaping Use: Never used   Substance and Sexual Activity    Alcohol use: Not Currently     Alcohol/week: 0.0 oz    Drug use: No      Social History     Substance and Sexual Activity   Drug Use No       CURRENT MEDICATIONS  Home Medications       Reviewed by Cheli Emerson R.N. (Registered Nurse) on 10/10/22 at 1441  Med List Status: Not Addressed     Medication Last Dose Status   albuterol (PROAIR HFA) 108 (90 Base) MCG/ACT Aero Soln inhalation aerosol  Active   ambrisentan (LETAIRIS) 10 MG tablet  Active   amitriptyline (ELAVIL) 25 MG Tab  Active   clonazePAM (KLONOPIN) 0.5 MG Tab  Active   fludrocortisone (FLORINEF) 0.1 MG Tab  Active   lacosamide (VIMPAT) 100 MG Tab tablet  Active   levetiracetam (KEPPRA) 750 MG tablet  Active   metoclopramide (REGLAN) 10 MG Tab  Active   morphine ER (MS CONTIN) 15 MG Tab CR tablet  Active   mycophenolate (CELLCEPT) 250 MG Cap  Active   omeprazole (PRILOSEC) 40 MG delayed-release capsule  Active   ondansetron (ZOFRAN ODT) 4 MG TABLET DISPERSIBLE  Active   oxyCODONE immediate release (ROXICODONE) 10 MG immediate release tablet  Active   potassium chloride SA (K-DUR) 10 MEQ Tab CR  Active   pravastatin (PRAVACHOL) 20 MG Tab  Active   sertraline (ZOLOFT) 100 MG Tab  Active   tacrolimus (PROGRAF) 1 MG Cap  Active   tadalafil (CIALIS) 20 MG tablet  Active                    ALLERGIES  Allergies   Allergen Reactions    Nsaids Unspecified     Can not take due to hx of liver transplant     Rhubarb Anaphylaxis    Other Drug Unspecified     Any medication that may interact with cyclosporine, tacrolimus, sirolimus, or prograf (due to hx of liver transplant)     Tylenol Unspecified     Liver transplant    Vancomycin Unspecified     Red man syndrome       PHYSICAL EXAM  VITAL SIGNS: /66   Pulse 75   Temp 37.4 °C (99.3 °F) (Temporal)   Resp 18   Ht 1.753 m (5' 9\")   Wt 52.2 kg (115 lb)   LMP 01/03/2000   SpO2 93%   BMI " 16.98 kg/m²   Pulse ox interpretation: I interpret this pulse ox as normal.    Constitutional: Alert in no apparent distress.  HENT: No signs of trauma, Bilateral external ears normal, Nose normal.   Eyes: Pupils are equal and reactive, Conjunctiva normal, Non-icteric.   Neck: Normal range of motion, No tenderness, Supple, No stridor.   Cardiovascular: Regular rate and rhythm, no murmurs.   Thorax & Lungs: Normal breath sounds, No respiratory distress, No wheezing, No chest tenderness.   Abdomen: Bowel sounds normal, Soft, No tenderness, No masses, No pulsatile masses. No peritoneal signs.  Skin: Warm, Dry, No erythema, No rash.   Back: No bony tenderness, No CVA tenderness.   Extremities: Intact distal pulses, No edema, No tenderness, No cyanosis, Negative Klaus's sign.  Musculoskeletal: Good range of motion in all major joints. No tenderness to palpation or major deformities noted.   Neurologic: Alert , cranial nerves are intact, speech is fluent, there is no drift, equal  strength, sensation is intact light touch throughout normal motor function, Normal sensory function, No focal deficits noted.   Psychiatric: Affect normal, Judgment normal, Mood normal.     DIAGNOSTIC STUDIES / PROCEDURES      LABS  Labs Reviewed   CBC WITH DIFFERENTIAL - Abnormal; Notable for the following components:       Result Value    WBC 3.7 (*)     RBC 3.93 (*)     Hemoglobin 11.8 (*)     MCHC 31.6 (*)     Platelet Count 140 (*)     MPV 8.9 (*)     Lymphs (Absolute) 0.91 (*)     All other components within normal limits   COMP METABOLIC PANEL - Abnormal; Notable for the following components:    Glucose 102 (*)     All other components within normal limits   ESTIMATED GFR       All labs reviewed by me.    RADIOLOGY  CT-HEAD W/O   Final Result      1. Mildly decreased size and density of hemispheric right subdural hematoma, measuring 4 mm in thickness, previously 7 mm.   2. No new intracranial abnormality.        The radiologist's  interpretation of all radiological studies have been reviewed and images independently viewed by me.    COURSE & MEDICAL DECISION MAKING  Nursing notes, VS, PMSFHx reviewed in chart.    3:22 PM Patient seen and examined at bedside.  Patient declines need for pain medication. Ordered for CT head, CBC, CMP to evaluate her symptoms.     In review of the patient records, she was admitted on 928 after a fall that resulted in bilateral subdural hematomas as well as right sylvian subarachnoid.  CT of her C-spine chest abdomen pelvis were negative.  Repeat CT scan showed stable bleeds.  Neurosurgery, Dr. Okeefe was consulted, recommended repeat CT in 2 weeks.  Additionally with her multiple falls, patient was evaluated by PT and OT who recommended skilled nursing facility placement which patient declined    4:43 PM  Patient is reevaluated, updated on results.  Discussed recommendations for SNF placement which she is adamantly declined        Decision Making:  This is a 62 y.o. female presenting with some continued headache.  Patient did have recent admission for subdural and subarachnoid so CT was obtained and is actually appears decreased in size.  She does not want any additional pain medication for her headache here either.  At this point no acute or emergent findings and she is neurologically intact.  She has had frequent falls and I discussed this with her given her potential for injury and her recent hip surgery and she has again declined rehabilitation placement.  There is no evidence of metabolic disturbance on her labs and she has no infectious symptoms today.    The patient will return for new or worsening symptoms and is stable at the time of discharge.    The patient is referred to a primary physician for blood pressure management, diabetic screening, and for all other preventative health concerns.        DISPOSITION:  Patient will be discharged home in stable condition.    FOLLOW UP:  [place]  [facility]  [address]  [facility] [id number] [phone]    In 1 week      Maxwell Okeefe M.D.  [address] [id number] [phone]          OUTPATIENT MEDICATIONS:  New Prescriptions    No medications on file         FINAL IMPRESSION  1. Nonintractable headache, unspecified chronicity pattern, unspecified headache type    2. Subdural hematoma             The note accurately reflects work and decisions made by me.  Nirmal Sheridan M.D.  10/10/2022  4:51 PM

## 2022-10-10 NOTE — ED TRIAGE NOTES
"Chief Complaint   Patient presents with    Headache     Pt endorses a headache x 3 weeks. Pt states it is keeping her from sleeping. Pt bib EMS from home. Pt denies any trauma, pt does not take blood thinners.      BP (!) 93/62   Pulse 77   Temp 37.4 °C (99.3 °F) (Temporal)   Resp 16   Ht 1.753 m (5' 9\")   Wt 52.2 kg (115 lb)   LMP 01/03/2000   SpO2 98%   BMI 16.98 kg/m²     Pt is wheeled in and out of triage in a hospital wheelchair. Appropriate PPE worn throughout entire encounter. Pt placed back in the lobby and educated about triage process.    "

## 2022-10-10 NOTE — DOCUMENTATION QUERY
[facility]                                                                       Query Response Note      PATIENT:               VICK LI  ACCT #:                  [id number]  MRN:                     [id number]  :                      1960  ADMIT DATE:       2022 3:45 AM  DISCH DATE:        10/3/2022 4:00 PM  RESPONDING  PROVIDER #:        [id number]           QUERY TEXT:    63 y/o female suffered a mechanical ground-level fall with brief loss of consciousness and admitted for subdural hematoma.   Per H&P - 5.5 mm right mixed density right holohemispheric subdural hematoma 2.2 mm left holohemispheric subdural hematoma. Right sylvian subarachnoid hemorrhages.  Non operative management.  The  CT Head demonstrated the regional sulcal effacement on the right has mildly worsened. Based on the diagnostic and clinical indicators, can a diagnosis be made?    NOTE- If an appropriate response is not listed below, please respond with a new note.      The patient's clinical indicators include:   CT Head - Mildly increased size of the extra axial hemorrhage at the right frontal lobe; Regional sulcal effacement on the right has mildly worsened.    H&P - suffered a mechanical ground-level fall. The patient had a brief loss of consciousness; Dx subdural hematoma, post traumatic - 5.5 mm right mixed density right holohemispheric subdural hematoma 2.2 mm left holohemispheric subdural hematoma. Right sylvian subarachnoid hemorrhages.  Non operative management    Treatment - CT Head; hourly neuro checks; repeat labs and monitoring    Risk factors - Subdural hematomas, subarachnoid hemorrhages    Thank you,  Rowena ALLRED  Clinical   Connect via [employer] Messenger  Options provided:   -- Brain compression is ruled in   -- Findings of no clinical significance   -- Other explanation, (please specify the other  explanation)   -- Unable to determine      Query created by: Rowena Dee on 10/2/2022 8:22 AM    RESPONSE TEXT:    Brain compression is ruled in          Electronically signed by:  LUCINDA ORTEGA MD 10/10/2022 11:24 AM

## 2022-10-10 NOTE — ED NOTES
Pt to [facility]    ABCs intact. A+Ox4. Pt denies CP and SOB. Pt HOPE and skin PWD. Vitals on the monitor.    Pt reports 3x headaches today

## 2022-10-11 NOTE — ED NOTES
Pt and  provided d/c instructions. Vitals assessed and WNL. No IV to d/c. Pt brought to 's car via wheelchair with all belongings.

## 2022-10-14 NOTE — MR AVS SNAPSHOT
"        Roxana Cuba   2017 2:40 PM   Office Visit   MRN: [id number]    Department:  [facility]   Dept Phone:  [phone]    Description:  Female : 1960   Provider:  Gwyn Gaming M.D.           Reason for Visit     Follow-Up Sarcoidosis      Allergies as of 2017     Allergen Noted Reactions    Rhubarb 2009   Anaphylaxis    Organic Nitrates 2017       Nitroglycerin products should be avoided with the use of PDE-5 inhibitors as the combination can result in severe hypotension.    Vancomycin Hcl 2016       Causes red man syndrome when infused to fast        Vital Signs     Blood Pressure Pulse Temperature Respirations Height Weight    114/64 mmHg 64 36.7 °C (98.1 °F) 15 1.727 m (5' 7.99\") 78.744 kg (173 lb 9.6 oz)    Body Mass Index Oxygen Saturation Last Menstrual Period Smoking Status          26.40 kg/m2 98% 2000 Passive Smoke Exposure - Never Smoker        Basic Information     Date Of Birth Sex Race Ethnicity Preferred Language    1960 Female White Non- English      Your appointments     Aug 25, 2017  7:30 AM   Adult Draw[facility] with [facility]   [facility])    [address]  [place] [address]   [phone]            Sep 06, 2017  8:00 AM   Adult Draw/[facility] with [facility]   [facility])    [address]  [place] [address]   [phone]            Oct 11, 2017  8:00 AM   Adult Draw/Collection with [facility]   [facility] ([facility])    [address]  [place] [address]   [phone]            Oct 20, 2017 10:20 AM   Established Patient with eBrnarda Reyes D.O.   [facility])    [address] [place] [address], [address]  [place] [address] [phone]           You will be receiving a confirmation call a few days before your appointment from our automated call confirmation system.            2017  8:00 AM   Adult Draw/Collection with [facility] " [address] Message sent to Antolin Patrick NP, Urology, FYI:  Pt not feeling well, nauseous, medicated wit Ondansetron, belching a little. Assisted back to bed, VSS so far, moaning a lot. States it feels like stomach cramps. Please either re-visit him or advise. 1201 Pt resting in bed, still not\" feeling good\". No nausea now, abd pain and cramping. [facility]   [facility] ([facility])    [address] [place] [address]   [phone]            Dec 06, 2017  8:00 AM   Adult Draw/Collection with [facility]   [facility] (--)    [address] [place] [address]   [phone]              Problem List              ICD-10-CM Priority Class Noted - Resolved    Status post liver transplant Dr. [place] ([facility]) Z94.4 Medium  3/13/2012 - Present    Hypothyroidism, adult E03.9 Low  3/13/2012 - Present    History of pancreatitis Z87.19 Medium  6/20/2012 - Present    H/O non-ST elevation myocardial infarction (NSTEMI) I25.2   5/16/2013 - Present    Lower extremity weakness R29.898 Low  6/26/2013 - Present    Anemia D64.9 Medium  9/13/2013 - Present    Anxiety F41.9 Low  11/4/2013 - Present    Insomnia G47.00 Low  11/4/2013 - Present    Sarcoidosis (CMS-HCC) D86.9 Medium  11/14/2013 - Present    COPD (chronic obstructive pulmonary disease) (CMS-HCC) J44.9 Low  11/14/2013 - Present    MGUS (monoclonal gammopathy of unknown significance) D47.2 Medium  11/14/2013 - Present    CKD (chronic kidney disease) stage 3, GFR 30-59 ml/min- unclear cause, probably multifactorial N18.3 Medium  2/25/2014 - Present    Hepatopulmonary syndrome (CMS-HCC) K76.81 Medium  3/5/2014 - Present    Ostium secundum type atrial septal defect, S/P percutaneous closure Q21.1 Medium  3/17/2014 - Present    Mild aortic regurgitation and aortic valve sclerosis I35.1 High  3/17/2014 - Present    Vitamin D deficiency E55.9 Medium  8/26/2014 - Present    Chronic respiratory failure (CMS-HCC) J96.10 Medium  11/13/2014 - Present    Pure hypercholesterolemia E78.00 High  12/9/2014 - Present    Pulmonary hypertension (CMS-HCC) I27.2 Low  2/3/2015 - Present    On prednisone therapy Z79.52   7/6/2015 - Present    Mild mitral regurgitation I34.0   7/14/2015 - Present    Splenomegaly R16.1   7/14/2015 - Present    Immunocompromised state (CMS-HCC) D84.9   11/14/2015 - Present    Splenic lesion  D73.9   11/14/2015 - Present    Hx of gastric bypass Z98.890   1/6/2016 - Present    Back pain M54.9   2/22/2016 - Present    Chronic pain syndrome G89.4   6/15/2016 - Present    Other allergic rhinitis J30.89   7/22/2016 - Present    Thrombocytopenia (CMS-HCC) D69.6   8/15/2016 - Present    GERD (gastroesophageal reflux disease) K21.9   1/4/2017 - Present    Iron deficiency E61.1   1/20/2017 - Present    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 difficile diarrhea A04.7   7/14/2017 - Present    VRE (vancomycin-resistant Enterococci) A49.1, Z16.21   7/14/2017 - Present    Herpes zoster without complication B02.9   7/14/2017 - Present      Health Maintenance        Date Due Completion Dates    IMM HEP B VACCINE (2 of 3 - Primary Series) 8/11/2017 7/14/2017    IMM INFLUENZA (1) 1/30/2018 (Originally 9/1/2017) 10/1/2016, 9/28/2015, 9/28/2015, 10/14/2014, 10/1/2014, 9/16/2013, 9/16/2013, 9/27/2012, 9/27/2012, 10/10/2011, 10/1/2011, 10/19/2010    MAMMOGRAM 2/1/2018 2/1/2017, 12/13/2016, 11/2/2015, 10/20/2014, 8/4/2013 (Done)    Override on 8/4/2013: Done ([facility])    COLONOSCOPY 3/27/2027 3/27/2017, 9/20/2010    IMM DTaP/Tdap/Td Vaccine (2 - Td) 4/12/2027 4/12/2017            Current Immunizations     13-VALENT PCV PREVNAR 8/21/2016  9:40 AM, 8/21/2016    Hepatitis B Vaccine Recombivax (Adol/Adult) 7/14/2017    Influenza TIV (IM) 9/28/2015, 10/14/2014, 9/16/2013 11:43 PM, 9/27/2012 11:57 PM, 10/1/2011    Influenza Vaccine  Adult HD 10/1/2016    Influenza Vaccine Quad Inj (Preserved) 9/28/2015, 10/1/2014, 9/16/2013, 9/27/2012, 10/10/2011, 10/19/2010    Pneumococcal polysaccharide vaccine (PPSV-23) 1/4/2017  6:09 AM, 1/4/2017, 10/8/2009  4:25 PM, 10/8/2009, 7/30/2009, 7/30/2009    Tdap Vaccine 4/12/2017      Below and/or attached are the medications your provider expects you to take. Review all of your home medications and newly ordered medications with your provider and/or pharmacist. Follow medication instructions as directed by your provider and/or pharmacist. Please keep your medication list with you and share with your provider. Update the information when medications are discontinued, doses are changed, or new medications (including over-the-counter products) are added; and carry medication information at all times in the event of emergency situations     Allergies:  RHUBARB - Anaphylaxis     ORGANIC NITRATES - (reactions not documented)     VANCOMYCIN HCL - (reactions not documented)               Medications  Valid as of: August 21, 2017 -  3:25 PM    Generic Name Brand Name Tablet Size Instructions for use    ALPRAZolam (Tab) XANAX 0.5 MG Take 1 Tab by mouth 3 times a day.        Ambrisentan (Tab) LETAIRIS 10 MG Take 1 Tab by mouth every day.        Ascorbic Acid (Tab) ascorbic acid 500 MG Take 500 mg by mouth every day.        Aspirin (Tablet Delayed Response) ECOTRIN 81 MG Take 1 Tab by mouth every morning.        Azithromycin (Tab) ZITHROMAX 250 MG Take 1 Tab by mouth every day.        Blood Glucose Monitoring Suppl (Device) Blood Glucose Monitoring Suppl  Meter: Dispense Device of Insurance Preference. Sig. Use as directed for blood sugar monitoring. #1. NR.        Blood Glucose Monitoring Suppl (Misc) Blood Glucose Monitoring Suppl Supplies Test strips order: Test strips for insurance preferred me meter. Sig: use Qdaily and prn ssx high or low sugar #100 RF x 3        Calcium Citrate-Vitamin D (Tab) CITRACAL MAXIMUM  315-250 MG-UNIT TAKE 2 TABS BY MOUTH 2X/ DAY WITH FOOD BEFORE AND AFTER DINNER FOR LIFE-CRUSH 1ST 3 MONTH, SPACE MVI        Cyclobenzaprine HCl (Tab) FLEXERIL 10 MG Take 10 mg by mouth 3 times a day as needed.        Docusate Sodium (Cap) COLACE 100 MG Take 100 mg by mouth every day.        Ferrous Sulfate (Elixir) FEOSOL 220 (44 Fe) MG/5ML Take 5 mL by mouth every day.        Fluticasone Propionate (Suspension) FLONASE 50 MCG/ACT SPRAY 1 SPRAY IN EACH NOSTRIL TWICE A DAY        Furosemide (Tab) LASIX 40 MG TAKE 1 TAB BY MOUTH EVERY DAY.        Gabapentin (Tab) NEURONTIN 600 MG Take 1 Tab by mouth 3 times a day.        Levothyroxine Sodium (Tab) SYNTHROID 137 MCG Take 1 Tab by mouth every day.        Linaclotide (Cap) Linaclotide 290 MCG Take 290 mg by mouth every day.        Linaclotide (Cap) Linaclotide 290 MCG Take 290 mcg by mouth.        Morphine Sulfate (Tab) MS IR 15 MG Take 1 Tab by mouth every four hours as needed for Severe Pain.        Mycophenolate Mofetil (Cap) CELLCEPT 250 MG Take 500 mg by mouth 2 times a day.        Nystatin (Suspension) MYCOSTATIN 119422 UNIT/ML Take 5 mL by mouth 4 times a day.        Omeprazole (CAPSULE DELAYED RELEASE) PRILOSEC 40 MG Take 1 Cap by mouth every day.        Ondansetron (TABLET DISPERSIBLE) ZOFRAN ODT 4 MG Take 1 Tab by mouth every 8 hours as needed for Nausea/Vomiting. Nausea and vomiting        OxyCODONE HCl (Tab) ROXICODONE 10 MG Take 1-3 Tabs by mouth every 6 hours as needed for Moderate Pain.        Pravastatin Sodium (Tab) PRAVACHOL 20 MG Take 1 Tab by mouth every day.        PredniSONE (Tab) DELTASONE 5 MG Take 7 mg by mouth every morning.        Probiotic Product   Take 1 Cap by mouth every day.        Sennosides (Tab) SENOKOT 8.6 MG Take 17.2 mg by mouth 2 times a day.        Sertraline HCl (Tab) ZOLOFT 100 MG Take 100 mg by mouth every bedtime.        Tacrolimus (Cap) PROGRAF 0.5 MG Take 1.5 mg by mouth 2 times a day.        Tadalafil (PAH) (Tab) Tadalafil  (PAH) 20 MG Take 40 mg by mouth every bedtime. Indications: Pulmonary Arterial Hypertension        Tiotropium Bromide Monohydrate (Cap) SPIRIVA 18 MCG Inhale 1 Cap by mouth every day.        TraZODone HCl (Tab) DESYREL 50 MG TAKE 1-2 TABS BY MOUTH EVERY BEDTIME.        .                 Medicines prescribed today were sent to:     [id number] - [place], [place] - [address]    [address] [place] [address]    Phone: [phone] Fax: [phone]    Open 24 Hours?: No    [facility] [place], [place] - [address]    [address] [facility] [address]    Phone: [phone] Fax: [phone]    Open 24 Hours?: No    [facility] - [place], [place] - [address]    [address] [place] [address]    Phone: [phone] Fax: [phone]    Open 24 Hours?: No      Medication refill instructions:       If your prescription bottle indicates you have medication refills left, it is not necessary to call your provider’s office. Please contact your pharmacy and they will refill your medication.    If your prescription bottle indicates you do not have any refills left, you may request refills at any time through one of the following ways: The online [facility] [facility] system (except Urgent Care), by calling your provider’s office, or by asking your pharmacy to contact your provider’s office with a refill request. Medication refills are processed only during regular business hours and may not be available until the next business day. Your provider may request additional information or to have a follow-up visit with you prior to refilling your medication.   *Please Note: Medication refills are assigned a new Rx number when refilled electronically. Your pharmacy may indicate that no refills were authorized even though a new prescription for the same medication is available at the pharmacy. Please request the medicine by name with the pharmacy before contacting your  provider for a refill.           MyChart Access Code: Activation code not generated  Current MyChart Status: Active

## 2022-10-17 NOTE — TELEPHONE ENCOUNTER
"Home Health paperwork received from [facility] requiring provider signature.     All appropriate fields completed by Medical Assistant: N/A CMA printed and distributed to MA    Paperwork placed in \"MA to Provider\" folder/basket. Awaiting provider completion/signature.    "

## 2022-10-20 NOTE — TELEPHONE ENCOUNTER
Received request via: Pharmacy  10/4/2022lov  Was the patient seen in the last year in this department? Yes    Does the patient have an active prescription (recently filled or refills available) for medication(s) requested? No

## 2022-10-20 NOTE — TELEPHONE ENCOUNTER
Received request via: Patient    Was the patient seen in the last year in this department? Yes  10/4/22  Does the patient have an active prescription (recently filled or refills available) for medication(s) requested? No

## 2022-10-23 PROBLEM — G93.40 ACUTE ENCEPHALOPATHY: Status: ACTIVE | Noted: 2022-01-01

## 2022-10-23 PROBLEM — T50.902A SUICIDE ATTEMPT BY DRUG OVERDOSE (HCC): Status: ACTIVE | Noted: 2022-01-01

## 2022-10-23 NOTE — H&P
[facility] [place] Internal Medicine History & Physical Note    Date of Service  10/22/2022    [facility] [place] Team: LAURA   Attending: Chuck Grant M.d.  Senior Resident: Dr. Fabiano Navarrete MD  Contact Number: [phone]    Primary Care Physician  Halley Levine M.D.    Consultants  None    Specialist Names: N/A    Code Status  Full Code    Chief Complaint  Chief Complaint   Patient presents with    Drug Overdose     EMS reports pinpoint pupils with altered status, given 1mg IN narcan       History of Presenting Illness (HPI):   Patient is a 63yo female with PMH of liver transplant on immunosuppresion, pulm HTN, CHRF on 4L, and seizures that presents with altered mental status and pinpoint pupils per EMS, received Narcan en route. Patient was assessed at bed side; somnolent. Able to open eyes on verbal command and able to answer some yes/no questions, as well as perform some commands such as touching their nose. GCS 13. Of notes, patient was recently admitted 09/28 to 10/03 with subdural hematoma and declined SNF; had repeat CT head 10/10 with decreased thickness from 7mm to 4mm.    In the ED, Hgb 11.4, WBC 2.3, , AG 6. Tylenol <5, alcohol <10.1, salicylates <1. EKG NSR. CXR neg. CT head relatively unchanged from previous, subdural hematoma 5mm in maximal thickness, increased from 4mm 10/10.    Review of Systems  Review of Systems   Unable to perform ROS: Mental status change     Past Medical History   has a past medical history of Anemia, Cardiomegaly, Cataract, Chronic obstructive pulmonary disease (HCC), Cirrhosis (HCC) (12/2011), CKD (chronic kidney disease) stage 3, GFR 30-59 ml/min (HCC), Diabetes (HCC), Emphysema of lung (HCC), Fracture of left foot, GERD (gastroesophageal reflux disease), H/O Clostridium difficile infection (02/17/2017), Heart burn, Hemorrhagic disorder (HCC), Hiatus hernia syndrome, High cholesterol, Hypertension, Hypothyroid, Indigestion, Jaundice (2009), Liver transplanted (HCC),  Low back pain (02/17/2017), Memory difficulty, Mild aortic regurgitation and aortic valve sclerosis ( ), On home oxygen therapy, Platelet disorder (HCC), Pneumonia, Psychiatric disorder, Pulmonary hypertension (HCC), S/P cholecystectomy, Seizure (HCC) (05/29/2022), Shoulder pain, Small bowel obstruction (HCC), Splenomegaly, and VRE (vancomycin-resistant Enterococci).    Surgical History   has a past surgical history that includes pr anesth,nose,sinus surgery; makayla by laparoscopy (09/19/2009); exam under anesthesia (11/11/2009); hysterectomy, total abdominal; gastroscopy-endo (03/12/2010); bronchoscopy-endo (05/29/2012); bronchoscopy-endo (06/19/2012); sigmoidoscopy flex (09/26/2012); recovery (02/27/2013); bronchoscopy-endo (11/15/2013); recovery (01/21/2014); recovery (03/24/2014); hemorrhoidectomy (11/11/2009); gastroscopy (09/28/2012); carpal tunnel release; bone marrow aspiration (12/31/2012); bone marrow biopsy, ndl/trocar (12/31/2012); recovery (03/31/2014); other abdominal surgery (12/2011); bone marrow aspiration (03/16/2015); bone marrow biopsy, ndl/trocar (03/16/2015); lung biopsy open (11/2016); tonsillectomy; other abdominal surgery (12/2015); breast biopsy (Left, 02/21/2017); colonoscopy (N/A, 03/27/2017); gastroscopy (N/A, 03/27/2017); gastric bypass laparoscopic (2018); hernia repair; makayla by laparoscopy; mammoplasty reduction; panniculectomy (12/11/2017); other surgical procedure; turbinate reduction (Bilateral, 10/04/2018); nasal reconstruction (Bilateral, 10/04/2018); ventral hernia repair robotic xi (N/A, 11/12/2018); craniotomy (Left, 08/04/2021); pr upper gi endoscopy,diagnosis (N/A, 02/03/2022); pr colonoscopy,diagnostic (N/A, 6/21/2022); pr upper gi endoscopy,diagnosis (N/A, 6/21/2022); and pr partial hip replacement (Right, 8/28/2022).     Family History  family history includes Alcohol/Drug in her maternal grandfather, maternal grandmother, and mother; Cancer in her paternal aunt; Diabetes  in her father; Heart Disease in her father; Hyperlipidemia in her father and mother; Hypertension in her father; Non-contributory in her mother; Other in her father; Stroke in her father.   Family history reviewed with patient.     Social History  Tobacco: Unknown due to mental status  Alcohol: Unknown due to mental status  Recreational drugs (illegal or prescription): Unknown due to mental status  Employment: Unknown due to mental status  Living Situation: Unknown due to mental status  Recent Travel: Unknown due to mental status  Primary Care Provider: Reviewed Dr. Abner SIEGEL  Other (stressors, spirituality, exposures): Unknown due to mental status    Allergies  Allergies   Allergen Reactions    Nsaids Unspecified     Can not take due to hx of liver transplant     Rhubarb Anaphylaxis    Other Drug Unspecified     Any medication that may interact with cyclosporine, tacrolimus, sirolimus, or prograf (due to hx of liver transplant)     Tylenol Unspecified     Liver transplant    Vancomycin Unspecified     Red man syndrome       Medications  Prior to Admission Medications   Prescriptions Last Dose Informant Patient Reported? Taking?   albuterol (PROAIR HFA) 108 (90 Base) MCG/ACT Aero Soln inhalation aerosol  Family Member No No   Sig: Inhale 2 Puffs every four hours as needed for Shortness of Breath (wheezing).   ambrisentan (LETAIRIS) 10 MG tablet  Family Member No No   Sig: TAKE 1 TABLET BY MOUTH EVERY DAY   amitriptyline (ELAVIL) 25 MG Tab   Yes No   Sig: Take 25 mg by mouth every evening.   clonazePAM (KLONOPIN) 0.5 MG Tab   Yes No   Sig: Take  by mouth 3 times a day.   fludrocortisone (FLORINEF) 0.1 MG Tab  Family Member Yes No   Sig: Take 0.1 mg by mouth every day.   lacosamide (VIMPAT) 100 MG Tab tablet  Family Member No No   Sig: Take 1 Tablet by mouth 2 times a day for 180 days.   levetiracetam (KEPPRA) 750 MG tablet   No No   Sig: Take 2 Tablets by mouth every 12 hours for 30 days.   metoclopramide  (REGLAN) 10 MG Tab  Family Member Yes No   Sig: Take 10 mg by mouth 2 times a day.   morphine ER (MS CONTIN) 15 MG Tab CR tablet   No No   Sig: Take 1 Tablet by mouth every 12 hours for 30 days.   mycophenolate (CELLCEPT) 250 MG Cap  Family Member Yes No   Sig: Take 250 mg by mouth 2 times a day. Indications: Liver Transplant Recipient   omeprazole (PRILOSEC) 40 MG delayed-release capsule  Family Member No No   Sig: Take 1 Capsule by mouth every day.   ondansetron (ZOFRAN ODT) 4 MG TABLET DISPERSIBLE   No No   Sig: DISSOLVE 1 TAB ON TONGUE EVERY 8 HOURS AS NEEDED FOR NAUSEA   oxyCODONE immediate release (ROXICODONE) 10 MG immediate release tablet   No No   Sig: Take 1 Tablet by mouth in the morning, at noon, and at bedtime for 30 days.   potassium chloride SA (K-DUR) 10 MEQ Tab CR  Family Member No No   Sig: Take 1 Tablet by mouth every day.   pravastatin (PRAVACHOL) 20 MG Tab  Family Member No No   Sig: Take 1 Tablet by mouth every day.   sertraline (ZOLOFT) 100 MG Tab  Family Member No No   Sig: Take 1.5 Tablets by mouth every day.   tacrolimus (PROGRAF) 1 MG Cap   No No   Sig: Take 2 Capsules by mouth 2 times a day.   tadalafil (CIALIS) 20 MG tablet  Family Member No No   Sig: Take 1 Tablet by mouth every day.      Facility-Administered Medications: None       Physical Exam  Temp:  [36.1 °C (97 °F)-36.9 °C (98.4 °F)] 36.9 °C (98.4 °F)  Pulse:  [] 77  Resp:  [11-20] 18  BP: ()/(53-68) 91/57  SpO2:  [89 %-100 %] 90 %  Blood Pressure: 113/56   Temperature: 36.1 °C (97 °F)   Pulse: 79   Respiration: 20   Pulse Oximetry: 97 %       Physical Exam  Constitutional:       Comments: Limited due to AMS/somnolence  Cachectic   HENT:      Head: Normocephalic and atraumatic.   Cardiovascular:      Rate and Rhythm: Normal rate and regular rhythm.      Heart sounds: Normal heart sounds. No murmur heard.    No friction rub. No gallop.   Pulmonary:      Effort: No respiratory distress.      Breath sounds: Normal breath  sounds. No stridor. No wheezing, rhonchi or rales.   Abdominal:      General: There is no distension.      Palpations: Abdomen is soft. There is no mass.      Tenderness: There is no abdominal tenderness. There is no guarding or rebound.   Musculoskeletal:         General: No swelling.      Right lower leg: No edema.      Left lower leg: No edema.   Skin:     General: Skin is warm and dry.      Coloration: Skin is not jaundiced.       Laboratory:  Recent Labs     10/22/22  2142   WBC 2.3*   RBC 3.88*   HEMOGLOBIN 11.4*   HEMATOCRIT 37.6   MCV 96.9   MCH 29.4   MCHC 30.3*   RDW 48.5   PLATELETCT 102*   MPV 8.6*     Recent Labs     10/22/22  2142   SODIUM 138   POTASSIUM 4.4   CHLORIDE 101   CO2 31   GLUCOSE 142*   BUN 12   CREATININE 0.84   CALCIUM 9.1     Recent Labs     10/22/22  2142 10/23/22  0351   ALTSGPT 13  --    ASTSGOT 21  --    ALKPHOSPHAT 73  --    TBILIRUBIN 0.2  --    LIPASE  --  6*   GLUCOSE 142*  --          No results for input(s): NTPROBNP in the last 72 hours.      No results for input(s): TROPONINT in the last 72 hours.    Imaging:  CT-HEAD W/O   Final Result      1. No significant change in small right subdural hematoma.   2. No new intracranial abnormality.         DX-CHEST-PORTABLE (1 VIEW)   Final Result      No acute cardiopulmonary abnormality.          X-Ray:  I have personally reviewed the images and compared with prior images.  EKG:  I have personally reviewed the images and compared with prior images.    Assessment/Plan:  Problem Representation:   Patient is a 63yo female with PMH of liver transplant on immunosuppresion, pulm HTN, CHRF on 4L, and seizures that presents with altered mental status and pinpoint pupils per EMS, received Narcan en route. CT head relatively unchanged from previous. Admitted for acute encephalopathy, possible accidental opioid overdose as patient noted to have pinpoint pupils en route; unable to assess on admission as patient actively resisting opening of  eyes.    I anticipate this patient is appropriate for observation status at this time because acute encephalopathy.    Patient will need a Med/Surg bed on MEDICAL service .  The need is secondary to acute encephalopathy.    * Acute encephalopathy- (present on admission)  Assessment & Plan  Possible accidental opioid overdose as patient noted to have pinpoint pupils en route to ED; unable to assess on admission as patient actively resisting opening of eyes. Chronic opioid use at home. Received Narcan x1 by EMS. CT head relatively unchanged from 10/10. GCS 13 (E 3, 4 V, 6 M).    -Received additional Narcan 0.4mg after admission  -Ordered ammonia, LA, UA, UDS, lipase, B12, TSH, and thiamine    Debility- (present on admission)  Assessment & Plan  BMI 17.55.    -PT/OT  -Nutrition consult    Seizures (HCC)- (present on admission)  Assessment & Plan  -Continue home lacosamide and levetiracetam    SDH (subdural hematoma)- (present on admission)  Assessment & Plan  Admission 09/28 to 10/03 for SDH. Initial CT head during that admission with SDH 7mm in maximal thickness, with subsequent CT head 10/10 showing 4mm and most recent 5mm.    -CTM    Adrenal insufficiency (HCC)- (present on admission)  Assessment & Plan  -Fludrocortisone discontinued by pharmacy as not on formulary; needs to be brought in    Pulmonary hypertension (HCC)- (present on admission)  Assessment & Plan  -Home ambrisentan and tadalafil discontinued by pharmacy as not on formulary; needs to be brought in    Chronic respiratory failure (HCC)- (present on admission)  Assessment & Plan  On 4L at home. Without increased oxygen demand. CXR neg.    -RT protocol  -CTM    History of liver transplant (HCC)- (present on admission)  Assessment & Plan  -Continue home tacrolimus and mycophenolate        VTE prophylaxis: SCDs/TEDs and pharmacologic prophylaxis contraindicated due to risk of bleed.

## 2022-10-23 NOTE — ED PROVIDER NOTES
ED Provider Note    Scribed for Jr Tamez by Ruby Metz. 10/22/2022  8:58 PM    Primary care provider: Halley Levine M.D.  Means of arrival: EMS  History obtained from: Patient  History limited by: None    CHIEF COMPLAINT  Chief Complaint   Patient presents with    Drug Overdose     EMS reports pinpoint pupils with altered status, given 1mg IN narcan     HPI  Roxana Cuba is a 62 y.o. female who presents to the Emergency Department via EMS to bedside following a presumed morphine overdose at 4 PM. The patient self administers morphine daily for comprehensive prior medical history and chronic pain. Patient denies intentional drug overdose, and has not been taking the proper amount of Morphine as directed by her doctor. Her morphine bottle was empty when EMS arrived, though she had recently filled the prescription.  found the patient unresponsive 2 hours after the presumed drug overdose. She has some associated shortness of breath at this time, but she notes her tremors are present at baseline. He called EMS, and when they arrived she was administered 1 mg Narcan secondary to pinpoint pupils and suspected drug overdose. Blood sugar was in the 200s per EMS. She denies any drug, alcohol, or cigarette use.  Quality:drug overdose  Duration: 5 hours  Severity: moderate  Associated sx: shortness of breath    REVIEW OF SYSTEMS  As above, all other systems reviewed and are negative.   See HPI for further details.     PAST MEDICAL HISTORY   has a past medical history of Anemia, Cardiomegaly, Cataract, Chronic obstructive pulmonary disease (HCC), Cirrhosis (HCC) (12/2011), CKD (chronic kidney disease) stage 3, GFR 30-59 ml/min (HCC), Diabetes (HCC), Emphysema of lung (HCC), Fracture of left foot, GERD (gastroesophageal reflux disease), H/O Clostridium difficile infection (02/17/2017), Heart burn, Hemorrhagic disorder (HCC), Hiatus hernia syndrome, High cholesterol, Hypertension,  Hypothyroid, Indigestion, Jaundice (2009), Liver transplanted (HCC), Low back pain (02/17/2017), Memory difficulty, Mild aortic regurgitation and aortic valve sclerosis ( ), On home oxygen therapy, Platelet disorder (HCC), Pneumonia, Psychiatric disorder, Pulmonary hypertension (HCC), S/P cholecystectomy, Seizure (HCC) (05/29/2022), Shoulder pain, Small bowel obstruction (HCC), Splenomegaly, and VRE (vancomycin-resistant Enterococci).  SURGICAL HISTORY   has a past surgical history that includes anesth,nose,sinus surgery; makayla by laparoscopy (09/19/2009); exam under anesthesia (11/11/2009); hysterectomy, total abdominal; gastroscopy-endo (03/12/2010); bronchoscopy-endo (05/29/2012); bronchoscopy-endo (06/19/2012); sigmoidoscopy flex (09/26/2012); recovery (02/27/2013); bronchoscopy-endo (11/15/2013); recovery (01/21/2014); recovery (03/24/2014); hemorrhoidectomy (11/11/2009); gastroscopy (09/28/2012); carpal tunnel release; bone marrow aspiration (12/31/2012); bone marrow biopsy, ndl/trocar (12/31/2012); recovery (03/31/2014); other abdominal surgery (12/2011); bone marrow aspiration (03/16/2015); bone marrow biopsy, ndl/trocar (03/16/2015); lung biopsy open (11/2016); tonsillectomy; other abdominal surgery (12/2015); breast biopsy (Left, 02/21/2017); colonoscopy (N/A, 03/27/2017); gastroscopy (N/A, 03/27/2017); gastric bypass laparoscopic (2018); hernia repair; makayla by laparoscopy; mammoplasty reduction; panniculectomy (12/11/2017); other surgical procedure; turbinate reduction (Bilateral, 10/04/2018); nasal reconstruction (Bilateral, 10/04/2018); ventral hernia repair robotic xi (N/A, 11/12/2018); craniotomy (Left, 08/04/2021); upper gi endoscopy,diagnosis (N/A, 02/03/2022); colonoscopy,diagnostic (N/A, 6/21/2022); upper gi endoscopy,diagnosis (N/A, 6/21/2022); and partial hip replacement (Right, 8/28/2022).  SOCIAL HISTORY  Social History     Tobacco Use    Smoking status: Never    Smokeless tobacco: Never     Tobacco comments:     avoid all tobacco products   Vaping Use    Vaping Use: Never used   Substance Use Topics    Alcohol use: Not Currently     Alcohol/week: 0.0 oz    Drug use: No      Social History     Substance and Sexual Activity   Drug Use No     FAMILY HISTORY  Family History   Problem Relation Age of Onset    Other Father         Unknown (dead 10 years)    Diabetes Father     Heart Disease Father     Hypertension Father     Hyperlipidemia Father     Stroke Father     Non-contributory Mother     Hyperlipidemia Mother     Alcohol/Drug Mother     Cancer Paternal Aunt     Alcohol/Drug Maternal Grandmother     Alcohol/Drug Maternal Grandfather      CURRENT MEDICATIONS  Home Medications       Reviewed by Tammie Cordero R.N. (Registered Nurse) on 10/22/22 at 2057  Med List Status: Not Addressed     Medication Last Dose Status   albuterol (PROAIR HFA) 108 (90 Base) MCG/ACT Aero Soln inhalation aerosol  Active   ambrisentan (LETAIRIS) 10 MG tablet  Active   amitriptyline (ELAVIL) 25 MG Tab  Active   clonazePAM (KLONOPIN) 0.5 MG Tab  Active   fludrocortisone (FLORINEF) 0.1 MG Tab  Active   lacosamide (VIMPAT) 100 MG Tab tablet  Active   levetiracetam (KEPPRA) 750 MG tablet  Active   metoclopramide (REGLAN) 10 MG Tab  Active   morphine ER (MS CONTIN) 15 MG Tab CR tablet  Active   mycophenolate (CELLCEPT) 250 MG Cap  Active   omeprazole (PRILOSEC) 40 MG delayed-release capsule  Active   ondansetron (ZOFRAN ODT) 4 MG TABLET DISPERSIBLE  Active   oxyCODONE immediate release (ROXICODONE) 10 MG immediate release tablet  Active   potassium chloride SA (K-DUR) 10 MEQ Tab CR  Active   pravastatin (PRAVACHOL) 20 MG Tab  Active   sertraline (ZOLOFT) 100 MG Tab  Active   tacrolimus (PROGRAF) 1 MG Cap  Active   tadalafil (CIALIS) 20 MG tablet  Active             ALLERGIES  Allergies   Allergen Reactions    Nsaids Unspecified     Can not take due to hx of liver transplant     Rhubarb Anaphylaxis    Other Drug Unspecified      Any medication that may interact with cyclosporine, tacrolimus, sirolimus, or prograf (due to hx of liver transplant)     Tylenol Unspecified     Liver transplant    Vancomycin Unspecified     Red man syndrome       PHYSICAL EXAM    VITAL SIGNS:   Vitals:    10/22/22 2102 10/22/22 2130 10/22/22 2200 10/22/22 2230   BP:  133/59 135/57 104/60   Pulse: 90 (!) 125 84 77   Resp: 18 19 16 15   Temp:       TempSrc:       SpO2: 95% 97% 98% 100%   Weight:       Height:           Vitals: My interpretation: normotensive, tachycardic, afebrile, not hypoxic    Reinterpretation of vitals: Improved    Cardiac Monitor Interpretation: The cardiac monitor revealed normal Sinus Rhythm as interpreted by me. The cardiac monitor was ordered secondary to the patient's history of unintentional overdose and to monitor for dysrhythmia and/or tachycardia.    PE:  Constitutional: Well developed, Well nourished, No acute distress, Non-toxic appearance.   HENT: Normocephalic, Atraumatic, Bilateral external ears normal, Oropharynx is clear mucous membranes are moist. No oral exudates or nasal discharge.   Eyes: Pupils are equal round and reactive, EOMI, Conjunctiva normal, No discharge.   Neck: Normal range of motion, No tenderness, Supple, No stridor. No meningismus.  Lymphatic: No lymphadenopathy noted.   Cardiovascular: tachycardic rate and regular rhythm without murmur rub or gallop.  Thorax & Lungs: Clear breath sounds bilaterally without wheezes, rhonchi or rales. There is no chest wall tenderness.   Abdomen: Soft non-tender non-distended. There is no rebound or guarding. No organomegaly is appreciated. Bowel sounds are normal.  Skin: Normal without rash.   Back: No CVA or spinal tenderness.   Extremities: Intact distal pulses, No edema, No tenderness, No cyanosis, No clubbing. Capillary refill is less than 2 seconds.  Musculoskeletal: Good range of motion in all major joints. No tenderness to palpation or major deformities noted.    Neurologic: Mildly somnolent but arousable to verbal stimulation. Follows commands. Tremulous. Alert & oriented x 3, Normal motor function, Normal sensory function, No focal deficits noted. Reflexes are normal.  Psychiatric: Somnolent affect, Judgment normal, Mood normal. There is no suicidal ideation or patient reported hallucinations.     DIAGNOSTIC STUDIES / PROCEDURES    LABS  Results for orders placed or performed during the hospital encounter of 10/22/22   Blood Alcohol   Result Value Ref Range    Diagnostic Alcohol <10.1 <10.1 mg/dL   CBC WITH DIFFERENTIAL   Result Value Ref Range    WBC 2.3 (L) 4.8 - 10.8 K/uL    RBC 3.88 (L) 4.20 - 5.40 M/uL    Hemoglobin 11.4 (L) 12.0 - 16.0 g/dL    Hematocrit 37.6 37.0 - 47.0 %    MCV 96.9 81.4 - 97.8 fL    MCH 29.4 27.0 - 33.0 pg    MCHC 30.3 (L) 33.6 - 35.0 g/dL    RDW 48.5 35.9 - 50.0 fL    Platelet Count 102 (L) 164 - 446 K/uL    MPV 8.6 (L) 9.0 - 12.9 fL    Neutrophils-Polys 45.80 44.00 - 72.00 %    Lymphocytes 40.90 22.00 - 41.00 %    Monocytes 7.60 0.00 - 13.40 %    Eosinophils 4.90 0.00 - 6.90 %    Basophils 0.40 0.00 - 1.80 %    Immature Granulocytes 0.40 0.00 - 0.90 %    Nucleated RBC 0.00 /100 WBC    Neutrophils (Absolute) 1.03 (L) 2.00 - 7.15 K/uL    Lymphs (Absolute) 0.92 (L) 1.00 - 4.80 K/uL    Monos (Absolute) 0.17 0.00 - 0.85 K/uL    Eos (Absolute) 0.11 0.00 - 0.51 K/uL    Baso (Absolute) 0.01 0.00 - 0.12 K/uL    Immature Granulocytes (abs) 0.01 0.00 - 0.11 K/uL    NRBC (Absolute) 0.00 K/uL   COMP METABOLIC PANEL   Result Value Ref Range    Sodium 138 135 - 145 mmol/L    Potassium 4.4 3.6 - 5.5 mmol/L    Chloride 101 96 - 112 mmol/L    Co2 31 20 - 33 mmol/L    Anion Gap 6.0 (L) 7.0 - 16.0    Glucose 142 (H) 65 - 99 mg/dL    Bun 12 8 - 22 mg/dL    Creatinine 0.84 0.50 - 1.40 mg/dL    Calcium 9.1 8.5 - 10.5 mg/dL    AST(SGOT) 21 12 - 45 U/L    ALT(SGPT) 13 2 - 50 U/L    Alkaline Phosphatase 73 30 - 99 U/L    Total Bilirubin 0.2 0.1 - 1.5 mg/dL    Albumin  3.4 3.2 - 4.9 g/dL    Total Protein 5.8 (L) 6.0 - 8.2 g/dL    Globulin 2.4 1.9 - 3.5 g/dL    A-G Ratio 1.4 g/dL   Acetaminophen Level   Result Value Ref Range    Acetaminophen -Tylenol <5.0 (L) 10.0 - 30.0 ug/mL   SALICYLATE LEVEL   Result Value Ref Range    Salicylates, Quant. <1.0 (L) 15.0 - 25.0 mg/dL   ESTIMATED GFR   Result Value Ref Range    GFR (CKD-EPI) 78 >60 mL/min/1.73 m 2   EKG (NOW)   Result Value Ref Range    Report       [facility] Emergency Dept.    Test Date:  2022-10-22  Pt Name:    VICK LI             Department: ER  MRN:        [id number]                      Room:       [facility]  Gender:     Female                       Technician: [id number]  :        1960                   Requested By:JOSH HERR  Order #:    [id number]                    Reading MD: Josh Herr    Measurements  Intervals                                Axis  Rate:       85                           P:          66  MS:         49                           QRS:        42  QRSD:       99                           T:          63  QT:         393  QTc:        468    Interpretive Statements  Sinus rhythm  Short MS interval  Low voltage, extremity leads  Compared to ECG 2022 05:27:14  Short MS interval now present  Low QRS voltage now present  Intraventricular conduction delay no longer present  Electronically Signed On 10- 23:24:57 PDT by Josh Herr       *Note: Due to a large number of results and/or encounters for the requested time period, some results have not been displayed. A complete set of results can be found in Results Review.      All labs reviewed by me. Significant for alcohol negative, mild leukopenia, mild anemia, electrolytes are fairly unremarkable, glucose normal, renal function normal, liver enzymes normal, Tylenol salicylate negative    RADIOLOGY  DX-CHEST-PORTABLE (1 VIEW)   Final Result      No acute cardiopulmonary abnormality.        The  radiologist's interpretation of all radiological studies have been reviewed by me.    COURSE & MEDICAL DECISION MAKING  Nursing notes, VS, PMSFHx, labs, imaging, EKG reviewed in chart.    MDM: 8:58 PM Roxana Cuba is a 62 y.o. female who presented with unintentional overdose.  Patient is with significant chronic comorbidities, on chronic pain medications with morphine p.o. at home, her  is a caregiver.  He found her this evening unresponsive with pinpoint pupils but still breathing.  EMS gave her intranasal and IM Narcan and her GCS is 14 upon arrival here and she is alert and oriented x2.  She does require some mild supplemental oxygen as she does become hypoxic.  She does state that she did not intend to overdose but has been taking more morphine that she is supposed to due to her pain not being well controlled.  Her physical exam shows that she is mildly somnolent, will fall asleep and left alone but is arousable to verbal or physical stimuli.  Chest x-ray ordered to evaluate for possible aspiration and was negative.  EKG is within normal limits.  Her CBC and CMP show mild leukopenia and anemia but otherwise unremarkable.  Alcohol negative.  Tylenol and salicylate negative.  After discussion with the , he states that he is no longer able to care for the patient as he is 76 years old and she requires higher level of care.  Concern the patient is hypoxic, not medically compliant, with uncontrolled chronic pain, we will admit her to the hospitalist service for further evaluation and ultimate placement in skilled nursing facility when she is appropriate.    FINAL IMPRESSION  1. Drug overdose of undetermined intent, initial encounter Acute   2. Lethargy Acute   3. Hypoxia Acute      Ruby BRODY), am scribing for, and in the presence of, Jr Tamez.    Electronically signed by: Ruby Metz (Lee), 10/22/2022    IJr personally performed the services  described in this documentation, as scribed by Ruby Metz in my presence, and it is both accurate and complete.    The note accurately reflects work and decisions made by me.  Jr Tamez  10/22/2022  11:35 PM

## 2022-10-23 NOTE — THERAPY
"Speech Language Pathology   Clinical Swallow Evaluation     Patient Name: Roxana Cuba  AGE:  62 y.o., SEX:  female  Medical Record #: [id number]  Today's Date: 10/23/2022     Precautions  Precautions: (P) Fall Risk, Swallow Precautions ( See Comments)    HPI:61 YO female admitted 10/22 2/2 AMS and pinpoint pupils. PMHx: COPD, cirrhosis, CKD 3, diabetes, HTN, hemorrhagic disorder, liver transplant, psychiatric disorder. CMHx: acute encephalopathy, debility, seizures, SDH, adrenal insufficiency pulmonary HTN, chronic respiratory failure, hx of liver transplant. Seen by SLP during recent admission late september/early october 2022 with recs for reg/thins and reflux precautions. CXR 10/23 \"1. No significant change in small right subdural hematoma.2. No new intracranial abnormality.\" CXR 10/2 \"No acute cardiopulmonary abnormality.\"    Level of Consciousness: Drowsy, Lethargic  Affect/Behavior: Flat  Follows Directives: No  Orientation: Self  Hearing: Functional hearing  Vision: Functional vision    Prior Living Situation & Level of Function:  Pt did not answer PLOF questions.     Oral Mechanism Evaluation  Facial Symmetry: Central R facial droop  Facial Sensation: Pt did not follow commands to assess  Labial Observations: R sided weakness  Lingual Observations: Midline  Dentition: Poor  Comments:    Voice  Quality: Pt did not follow commands to assess  Resonance: Pt did not follow commands to assess  Intensity: Pt did not follow commands to assess  Cough: Pt did not follow commands to assess  Comments:    Current Method of Nutrition   NPO until cleared by speech pathology    Subjective  Pt lethargic, required max cues to maintain alertness, did not follow directions, nodded or shook head to answer questions, no verbalization despite encouragement.      Assessment  Positioning: Lewis's (60-90 degrees)  Bolus Administration: SLP  Oxygen Requirements:  2 L Nasal Cannula  Factor(s) Affecting Performance: Impaired " endurance, Impaired mental status, Impaired command following    Swallowing Trials  Thin Liquid (TN0): WFL  Liquidised (LQ3): Impaired  Pureed (PU4): Impaired  Minced & Moist (MM5): Impaired  Regular (RG7): Impaired    Comments:  Pt required mod verbal and tactile cues for adequate bolus acceptance, intact containment noted. Pt required 1:1 feeding assistance and encouragement to accept PO due to AMS, poor alertness and lethargy. Delayed swallow initiation and presumed incomplete hyolaryngeal elevation to palpation. Prolonged mastication of regular appreciated. Given lethargy and pt requiring constant cuing to maintain alertness, pt is not appropriate for a PO diet at this time.      Clinical Impressions  Clinical signs of oropharyngeal dysphagia include prolonged/inefficient mastication, oral holding, and delayed pharyngeal swallow trigger. Dysphagia is likely acute related to encephalopathy and AMS. Instrumental swallow study is not indicated to objectively assess swallow function and further direct POC.      Recommendations  NPO pending reassessment Monday 10/24  Okay for ice chips, small sips of thin water only and meds crushed in puree.  Diligent oral care    Anticipate progression to a PO diet with improved mentation/alertness. SLP following.       Plan    Recommend Speech Therapy 3 times per week until therapy goals are met for the following treatments:  Dysphagia Training and Patient / Family / Caregiver Education.    Discharge Recommendations: (P) Recommend post-acute placement for additional speech therapy services prior to discharge home     Objective       10/23/22 0948   Charge Group   SLP Oral Pharyngeal Evaluation Oral Pharyngeal Evaluation   Total Treatment Time   SLP Time Spent Yes   SLP Oral Pharyngeal Evaluation (Mins) 10   Initial Contact Note    Initial Contact Note  Order Received and Verified, Speech Therapy Evaluation in Progress with Full Report to Follow.   Precautions   Precautions Fall  "Risk;Swallow Precautions ( See Comments)   Vitals   O2 (LPM) 2   O2 Delivery Device Nasal Cannula   Pain 0 - 10 Group   Therapist Pain Assessment Post Activity Pain Same as Prior to Activity;Nurse Notified  (nodded yes\" to pain, no location or rating)   Prior Living Situation   Prior Services None   Housing / Facility 1 Story House   Steps Into Home 1   Steps In Home 14   Bathroom Set up Bathtub / Shower Combination  (sponge baths)   Equipment Owned Front-Wheel Walker;Wheelchair   Lives with - Patient's Self Care Capacity Spouse   Prior Level Of Function   Communication Within Functional Limits   Swallow Within Functional Limits   Hearing Within Functional Limits for Evaluation   Hearing Aid None   Vision Within Functional Limits for Evaluation   Patient's Primary Language English   Occupation (Pre-Hospital Vocational) Retired Due To Disability   Dysphagia Rating   Nutritional Liquid Intake Rating Scale Nothing by mouth   Nutritional Food Intake Rating Scale Nothing by mouth   Patient / Family Goals   Patient / Family Goal #1 none stated   Short Term Goals   Short Term Goal # 1 Pt will participate in prefeeding trials 1:1 with SLP with no s/sx of aspiration and min cues.   Education Group   Education Provided Dysphagia   Dysphagia Patient Response Patient;Acceptance;Explanation;Demonstration;Reinforcement Needed;No Learning Evidence   Problem List   Problem List Dysphagia   Anticipated Discharge Needs   Discharge Recommendations Recommend post-acute placement for additional speech therapy services prior to discharge home   Therapy Recommendations Upon DC Dysphagia Training;Patient / Family / Caregiver Education   Interdisciplinary Plan of Care Collaboration   IDT Collaboration with  Nursing   Patient Position at End of Therapy In Bed;Bed Alarm On;Seated;Call Light within Reach;Tray Table within Reach   Collaboration Comments RN aware of results/recs     "

## 2022-10-23 NOTE — CONSULTS
"BRIEF PSYCHIATRIC CONSULT NOTE: consult received for possible intentional overdose on home narcotics in SA (though the bottles \"were less full than usual\" per  ) but unable to participate today..concern is for possible SA. Currently  encephalopathic.    Medical Problems:   -acute encephalopathy  -debility  -Sz disorder  -SDH  -Adrenal Insufficiency  -Pulmonary HTN  -COPD  -Hx of liver transplant    CT: Mixed density right hemispheric subdural hematoma ... Left-sided frontoparietotemporal craniotomy is again noted with some associated dural thickening/postsurgical changes   Latest Reference Range & Units Most Recent   TSH 0.380 - 5.330 uIU/mL 1.130  10/22/22 21:42   Free T-4 0.93 - 1.70 ng/dL 1.10  9/29/22 06:30     Past Psych:    2013: anxiety, liver transplant 2011, dx of multiple myeloma in the past few months, cardiac issues. Ox 4. No psychosis. No psych hospitalizations.  Hx of zoloft. FM HX: alcoholism. Pt reported remote hx of alcoholism herself . Disability for liver transplant. Dx with mood disorder unspc R/O secondary to chronic medical conditions, R/O adjustment disorder, R/O personality disorder.   "

## 2022-10-23 NOTE — CARE PLAN
The patient is Watcher - Medium risk of patient condition declining or worsening    Shift Goals  Clinical Goals: Safety, Q4 neuro check, admission  Patient Goals: ADAM      Problem: Knowledge Deficit - Standard  Goal: Patient and family/care givers will demonstrate understanding of plan of care, disease process/condition, diagnostic tests and medications  Outcome: Progressing     Problem: Skin Integrity  Goal: Skin integrity is maintained or improved  Outcome: Progressing     Problem: Fall Risk  Goal: Patient will remain free from falls  Outcome: Progressing       Progress made toward(s) clinical / shift goals:  Patient oriented to the hospital room. Fall precautions in place. 4 eye skin assessment completed. Q4 neuro checks. Hourly rounding.

## 2022-10-23 NOTE — HOSPITAL COURSE
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    Patient was given Narcan in route concern for possible opioid overdose. In the ED, Tylenol <5, alcohol <10.1, salicylates <1. EKG NSR. CXR neg. CT head relatively unchanged from previous, subdural hematoma 5mm in maximal thickness, increased from 4mm 10/10.    Of note on prior admission for subdural hematoma, patient refused SNF and was discharged home with home health.  Patient has history of recurrent falls resulting in fractures and as noted in last admission subdural hematoma.    Due to concern of possible overdose, patient was placed on legal hold and psychiatry was consulted.  Her legal hold has been discontinued on 10/24.  Psychiatry recommended transitioning from Keppra to Depakote.  However given patient's history of seizures (well controlled) and liver transplant will avoid transition to Depakote.

## 2022-10-23 NOTE — PROGRESS NOTES
[facility] Medicine Daily Progress Note    Date of Service  10/23/2022    Chief Complaint  Roxana Cuba is a 62 y.o. 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    Patient was given Narcan in route concern for possible opioid overdose. In the ED, Tylenol <5, alcohol <10.1, salicylates <1. EKG NSR. CXR neg. CT head relatively unchanged from previous, subdural hematoma 5mm in maximal thickness, increased from 4mm 10/10.    Of note on prior admission for subdural hematoma, patient refused SNF and was discharged home with home health.  Patient has history of recurrent falls resulting in fractures and as noted in last admission subdural hematoma.    Interval Problem Update  Spoke with the patient's  over the phone who states the patient has been depressed, but did not comment on any suicidal ideation or plan.  However he did note that her morphine ER and oxycodone bottles are less full than normal.  He did not count the pills so he is unclear how much she actually consumed.    He does arrange her medications, however for her pain medications she is the one to manage them.    He states she is on this pain medication for her history of pelvic fracture, previous history of craniotomy.    Patient is still altered, she is able to tell me she is in the hospital and the name of her  but otherwise cannot answer my questions and dozes off.  Vitals are stable, patient is in no respiratory  distress.    Given his concern for possible suicide attempt, I will place patient on a legal hold, psychiatry consulted.  Forms filled out.    I have discussed this patient's plan of care and discharge plan at IDT rounds today with Case Management, Nursing, Nursing leadership, and other members of the IDT team.    Consultants/Specialty  None    Code Status  Full Code    Disposition  Patient is not medically cleared for discharge.   Anticipate discharge to  TBD .  I have placed the appropriate orders for post-discharge needs.    Review of Systems  Review of Systems   Unable to perform ROS: Mental status change      Physical Exam  Temp:  [36.1 °C (97 °F)-37.2 °C (98.9 °F)] 37.2 °C (98.9 °F)  Pulse:  [] 70  Resp:  [11-20] 14  BP: ()/(53-68) 102/53  SpO2:  [89 %-100 %] 93 %    Physical Exam  Vitals and nursing note reviewed.   Constitutional:       General: She is not in acute distress.     Appearance: She is ill-appearing.   HENT:      Head: Normocephalic and atraumatic.      Mouth/Throat:      Mouth: Mucous membranes are moist.      Pharynx: No oropharyngeal exudate.   Eyes:      Extraocular Movements: Extraocular movements intact.      Pupils: Pupils are equal, round, and reactive to light.   Cardiovascular:      Rate and Rhythm: Normal rate and regular rhythm.      Pulses: Normal pulses.      Heart sounds: No murmur heard.    No friction rub. No gallop.   Pulmonary:      Effort: Pulmonary effort is normal. No respiratory distress.      Breath sounds: No wheezing, rhonchi or rales.   Abdominal:      General: Bowel sounds are normal. There is no distension.      Palpations: Abdomen is soft. There is no mass.      Tenderness: There is no abdominal tenderness.   Musculoskeletal:         General: No swelling or tenderness. Normal range of motion.      Cervical back: Normal range of motion. No rigidity. No muscular tenderness.      Right lower leg: No edema.      Left lower leg: No edema.   Skin:     General:  Skin is warm and dry.      Capillary Refill: Capillary refill takes less than 2 seconds.      Findings: No erythema or rash.   Neurological:      General: No focal deficit present.      Mental Status: She is alert. She is disoriented.      Motor: No weakness.      Gait: Gait normal.       Fluids  No intake or output data in the 24 hours ending 10/23/22 1253    Laboratory  Recent Labs     10/22/22  2142   WBC 2.3*   RBC 3.88*   HEMOGLOBIN 11.4*   HEMATOCRIT 37.6   MCV 96.9   MCH 29.4   MCHC 30.3*   RDW 48.5   PLATELETCT 102*   MPV 8.6*     Recent Labs     10/22/22  2142   SODIUM 138   POTASSIUM 4.4   CHLORIDE 101   CO2 31   GLUCOSE 142*   BUN 12   CREATININE 0.84   CALCIUM 9.1                   Imaging  CT-HEAD W/O   Final Result      1. No significant change in small right subdural hematoma.   2. No new intracranial abnormality.         DX-CHEST-PORTABLE (1 VIEW)   Final Result      No acute cardiopulmonary abnormality.           Assessment/Plan  * Acute encephalopathy- (present on admission)  Assessment & Plan  Possible accidental opioid overdose as patient noted to have pinpoint pupils en route to ED; unable to assess on admission as patient actively resisting opening of eyes. Chronic opioid use at home. Received Narcan x1 by EMS. CT head relatively unchanged from 10/10. GCS 13 (E 3, 4 V, 6 M).    -Received additional Narcan 0.4mg after admission  -Ordered ammonia, LA, UA, UDS, lipase, B12, TSH, and thiamine    Debility- (present on admission)  Assessment & Plan  BMI 17.55.    -PT/OT  -Nutrition consult    Seizures (HCC)- (present on admission)  Assessment & Plan  -Continue home lacosamide and levetiracetam    SDH (subdural hematoma)- (present on admission)  Assessment & Plan  Admission 09/28 to 10/03 for SDH. Initial CT head during that admission with SDH 7mm in maximal thickness, with subsequent CT head 10/10 showing 4mm and most recent 5mm.    -CTM    Adrenal insufficiency (HCC)- (present on  admission)  Assessment & Plan  -Fludrocortisone discontinued by pharmacy as not on formulary; needs to be brought in    Pulmonary hypertension (HCC)- (present on admission)  Assessment & Plan  -Home ambrisentan and tadalafil discontinued by pharmacy as not on formulary; needs to be brought in    Chronic respiratory failure (HCC)- (present on admission)  Assessment & Plan  On 4L at home. Without increased oxygen demand. CXR neg.    -RT protocol  -CTM    History of liver transplant (HCC)- (present on admission)  Assessment & Plan  -Continue home tacrolimus and mycophenolate       VTE prophylaxis: SCDs/TEDs    I have performed a physical exam and reviewed and updated ROS and Plan today (10/23/2022). In review of yesterday's note (10/22/2022), there are no changes except as documented above.

## 2022-10-23 NOTE — PROGRESS NOTES
Patient arrived to floor via gurney and transporter. Transferred patient to hospital bed and assumed care of patient at 02:25. Patient is A&Ox1, sating at 92-93% on 2L NC, and reports stomach pain but falls right back to sleep during conversation. MD Rudy at bedside assessing patient. Bed in the lowest position, wheels locked, bed alarm on, and call light within reach.

## 2022-10-23 NOTE — CARE PLAN
The patient is Watcher - Medium risk of patient condition declining or worsening    Shift Goals  Clinical Goals: safety, neuro checks  Patient Goals: sarah  Family Goals: n/a    Progress made toward(s) clinical / shift goals:    Problem: Knowledge Deficit - Standard  Goal: Patient and family/care givers will demonstrate understanding of plan of care, disease process/condition, diagnostic tests and medications  Outcome: Progressing     Problem: Skin Integrity  Goal: Skin integrity is maintained or improved  Outcome: Progressing     Problem: Fall Risk  Goal: Patient will remain free from falls  Outcome: Progressing       Patient is not progressing towards the following goals:

## 2022-10-23 NOTE — ED TRIAGE NOTES
Roxana Cuba  62 y.o.  Chief Complaint   Patient presents with    Drug Overdose     EMS reports pinpoint pupils with altered status, given 1mg IN narcan, pt A&Ox3     BIBA for above, pt takes morphine at home. Per EMS, pt had pinpoint pupils upon arrival and was altered and unresponsive, was given 1mg IN narcan, pt now speaking in full sentences, oriented only to self. Per report, pt's morphine bottle is due for refill in 2 weeks, bottle was empty today. FSBS 268.

## 2022-10-23 NOTE — ASSESSMENT & PLAN NOTE
Admission 09/28 to 10/03 for SDH. Initial CT head during that admission with SDH 7mm in maximal thickness, with subsequent CT head 10/10 showing 4mm and most recent 5mm.    -CTM

## 2022-10-23 NOTE — DISCHARGE PLANNING
HTH/SCP TCN chart review completed. Collaborated with SEGUNDO Obrien  prior to meeting with the pt. The most current review of medical record, knowledge of pt's PLOF and social support, LACE+ score of 77, 6 clicks scores of 6 ADL's and 6 for mobility were considered.  Patient is not medically cleared for discharge.  ST recommends post-acute placement for additional speech therapy services prior to discharge home.     Pt seen at bedside with spouse present.  Patient lives with spouse in a home with 14 stairs.  She has a FWW and a W/C. She was on 4 L/min O2 at home. Spouse has no concerns with food, housing or transportation.  Patient is not at baseline level of function.  Patient was recently admitted with a subdural hematoma from 9/28/22 to 10/3/22 and had declined SNF.  She went home with home health.  Spouse now in agreement with SNF/IRF choice.     Introduced TCN program. Provided education regarding post acute levels of care. Discussed HTH/SCP plan benefits (Meds to Beds, medical uber and [facility] transitional care). Pt verbalizes understanding.     Choice proactively obtained for SNF, IRF, faxed to [facility] and given to SEGUNDO. TCN will continue to follow and collaborate with discharge planning team as additional post acute needs arise. Thank you.     Completed today:  PT/OT/ST consults.  ST recommends post-acute placement on 10/23/22.  Awaiting PT/OT recommendations.   Choice obtained: SNF, IRF.   [facility] referral (N), Not sent.  Post-acute placement recommended.

## 2022-10-23 NOTE — PROGRESS NOTES
Assumed pt care at 0700 . Pt is A&Ox0.Pt is responds to voice, unable to answer questions appropriately.  Pt's ed is in the lowest position and locked.

## 2022-10-23 NOTE — RESPIRATORY CARE
" COPD EDUCATION by COPD CLINICAL EDUCATOR  10/23/2022 at 2:27 PM by Mesha Miller, RRT     Patient seen by COPD team. Patient is unable to engage in conversation at this time. Our team has seen her on multiple occasions. Summary is below and her Action Plan has been updated. Our team will revisit.  COPD Assessment  COPD Clinical Specialists ONLY  COPD Education Initiated: Yes--Short Intervention (Attempted to meet with pt;see admit notes on legal hold; difficult to engage Assessment completed and placed on revisit list)  Physician Name: CLAIRE Levine MD  Pulmonologist Name: Mark-Dr Gaming last appts cancelled  Smoking Cessation: N/A  Home Health Care:  (Pending placement -prior admissions refused SNF)  [facility]: Yes  Dispatch Health: N/A (UNABLE TO DISCUSS has prior Dispatch use)  Is this a COPD exacerbation patient?: No    PFT Results  Pulmonary Function Testing: Yes (last 8/11/2016 mixed obstructive/restrictive disease no current)    Meds to Beds  Would the patient like to opt in for Bedside Medication Delivery at Discharge?: Yes, interested     MY COPD ACTION PLAN     It is recommended that patients and physicians /healthcare providers complete this action plan together. This plan should be discussed at each physician visit and updated as needed.    The green, yellow and red zones show groups of symptoms of COPD. This list of symptoms is not comprehensive, and you may experience other symptoms. In the \"Actions\" column, your healthcare provider has recommended actions for you to take based on your symptoms.    Patient Name: Roxana Cuba   YOB: 1960   Last Updated on:     Green Zone:  I am doing well today Actions     Usual activitiy and exercise level   Take daily medications     Usual amounts of cough and phlegm/mucus   Use oxygen as prescribed     Sleep well at night   Continue regular exercise/diet plan     Appetite is good   At all times avoid cigarette smoke, " "inhaled irritants     Daily Medications (these medications are taken every day):                Yellow Zone:  I am having a bad day or a COPD flare Actions     More breathless than usual   Continue daily medications     I have less energy for my daily activities   Use quick relief inhaler as ordered     Increased or thicker phlegm/mucus   Use oxygen as prescribed     Using quick relief inhaler/nebulizer more often   Get plenty of rest     Swelling of ankles more than usual   Use pursed lip breathing     More coughing than usual   At all times avoid cigarette smoke, inhaled irritants     I feel like I have a \"chest cold\"     Poor sleep and my symptoms woke me up     My appetite is not good     My medicine is not helping      Call provider immediately if symptoms don’t improve     Continue daily medications, add rescue medications:   Albuterol 2 Puffs Every 4 hours PRN       Medications to be used during a flare up, (as Discussed with Provider):              Red Zone:  I need urgent medical care Actions     Severe shortness of breath even at rest   Call 911 or seek medical care immediately     Not able to do any activity because of breathing      Fever or shaking chills      Feeling confused or very drowsy       Chest pains      Coughing up blood                  "

## 2022-10-23 NOTE — ED NOTES
Med rec completed per dispense report from [facility]  Patient unable to participate in interview  Allergies assessed historically  Unknown last doses

## 2022-10-23 NOTE — DISCHARGE PLANNING
Referral sent per choice to [facility]    Referral sent per choice to [facility], [facility]    [facility]- [facility] declined /  facility full  [facility] declined / can not meet needs  [facility] declined / care exceeds

## 2022-10-23 NOTE — ASSESSMENT & PLAN NOTE
Possible accidental opioid overdose as patient noted to have pinpoint pupils en route to ED; unable to assess on admission as patient actively resisting opening of eyes. Chronic opioid use at home. Received Narcan x1 by EMS. CT head relatively unchanged from 10/10. GCS 13 (E 3, 4 V, 6 M).    -Received additional Narcan 0.4mg after admission  -Ordered ammonia, LA, UA, UDS, lipase, B12, TSH, and thiamine

## 2022-10-23 NOTE — DISCHARGE PLANNING
Case Management Discharge Planning    Admission Date: 10/22/2022  GMLOS:    ALOS: 0    6-Clicks ADL Score: 6  6-Clicks Mobility Score: 6  PT and/or OT Eval ordered: Yes  Post-acute Referrals Ordered: No  Post-acute Choice Obtained: Yes  Has referral(s) been sent to post-acute provider:  Yes      Anticipated Discharge Dispo:      DME Needed: No    Action(s) Taken: Choice obtained and Referral(s) sent    Escalations Completed: None    Medically Clear: No    Next Steps: [facility] obtained choice for SNF and rehab. Pending PT/OT recs. RNCM faxed choice forms to [facility].    Barriers to Discharge: Pending Placement and Pending PT Evaluation    Is the patient up for discharge tomorrow: No

## 2022-10-23 NOTE — ASSESSMENT & PLAN NOTE
-Home ambrisentan and tadalafil discontinued by pharmacy as not on formulary; needs to be brought in

## 2022-10-23 NOTE — PROGRESS NOTES
4 Eyes Skin Assessment Completed by PENNY yCr and PENNY Sparks.    Head WDL  Ears Redness and Blanching  Nose WDL  Mouth WDL  Neck WDL  Breast/Chest WDL  Shoulder Blades Redness and Blanching  Spine Redness and Blanching  (R) Arm/Elbow/Hand WDL  (L) Arm/Elbow/Hand WDL  Abdomen WDL  Groin WDL  Scrotum/Coccyx/Buttocks Redness and Blanching  (R) Leg Scar and bruising  (L) Leg Bruising  (R) Heel/Foot/Toe Dryness  (L) Heel/Foot/Toe Dryness          Devices In Places Blood Pressure Cuff, Pulse Ox, SCD's, and Nasal Cannula      Interventions In Place Gray Ear Foams, Heel Mepilex, Q2 Turns, and Pressure Redistribution Mattress    Possible Skin Injury No    Pictures Uploaded Into Epic N/A  Wound Consult Placed N/A  RN Wound Prevention Protocol Ordered No

## 2022-10-24 NOTE — THERAPY
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    At this time, pt appears near recent reported baseline level of function, and continues to demonstrate acute on chronic cognitive deficits. Pt is able to perform functional transfers without difficulties at this time. Pt declined ambulation this date, citing she does not walk at home; however, is likely physically capable of ambulation and was ambulatory on previous admission. Additional PT not indicated at this time, will discontinue order.     Plan    Recommend Physical Therapy for Evaluation only. Patient will not be actively followed for physical therapy services at this time, however may be seen if requested by physician for 1 more visit within 30 days to address any discharge or equipment needs.     DC Equipment Recommendations: None  Discharge Recommendations: Resume home health services if pt was actively being followed prior to admission.       Subjective  Pt states she does not walk when she is at home, and only uses her wheelchair to get around. Pt frequently stating \"I don't know\" to simple questions.     Objective       10/24/22 1120   Charge Group   PT Evaluation PT Evaluation Mod   Total Time Spent   PT Total Time Yes   PT Evaluation Time Spent (Mins) 14   PT Total Time Spent " "(Calculated) 14   Initial Contact Note    Initial Contact Note Order Received and Verified. Physical Therapy Evaluation NOT Completed Because Patient Does Not Require Acute Physical Therapy at this Time.   Precautions   Precautions Fall Risk   Vitals   O2 (LPM) 4   O2 Delivery Device Silicone Nasal Cannula   Pain   Pain Scales 0 to 10 Scale    Pain 0 - 10 Group   Pain Rating Scale (NPRS) 0   Prior Living Situation   Prior Services Skilled Home Health Services  (Pt unable to elaborate on services provided by home health)   Housing / Facility 1 Story House   Steps Into Home 0   Steps In Home 0   Equipment Owned Front-Wheel Walker;Wheelchair   Lives with - Patient's Self Care Capacity Spouse   Comments Pt reports spouse assists with dressing and showering ADLs. Pt reports being on 4L of O2 at baseline   Prior Level of Functional Mobility   Bed Mobility Independent   Transfer Status Independent   Ambulation Other (Comments)  (Pt reported not ambulating at home)   Assistive Devices Used Wheelchair   Wheelchair Required Assist   Comments pt states she only used the wheelchair while at home, and she did not walk (despite ambulatory previous admission). Pt states her spouse would push her around in the wheelchair at home.   Cognition    Level of Consciousness Alert   Ability To Follow Commands 1 Step   Comments Pt intermittently emotionally labile throughout, and frequently responding to questions \"I don't know.\" Slightly impulsive at times   Strength Lower Body   Lower Body Strength  WDL   Comments no focal deficits   Sensation Lower Body   Lower Extremity Sensation   WDL   Lower Body Muscle Tone   Lower Body Muscle Tone  WDL   Strength Upper Body   Upper Body Strength  WDL   Balance Assessment   Sitting Balance (Static) Good   Standing Balance (Static) Fair   Gait Analysis   Gait Level Of Assist   (Deferred as pt not interested in walking and states she has not been performing at home)   Bed Mobility    Supine to Sit " Independent   Sit to Supine Independent   Functional Mobility   Sit to Stand Supervised   Bed, Chair, Wheelchair Transfer Supervised  (bed<>BSC (pt declined ambulating to the bathroom))   How much difficulty does the patient currently have...   Turning over in bed (including adjusting bedclothes, sheets and blankets)? 4   Sitting down on and standing up from a chair with arms (e.g., wheelchair, bedside commode, etc.) 4   Moving from lying on back to sitting on the side of the bed? 4   How much help from another person does the patient currently need...   Moving to and from a bed to a chair (including a wheelchair)? 3   Need to walk in a hospital room? 3   Climbing 3-5 steps with a railing? 2   6 clicks Mobility Score 20   Education Group   Education Provided Role of Physical Therapist   Anticipated Discharge Equipment and Recommendations   DC Equipment Recommendations None   Discharge Recommendations Anticipate that the patient will have no further physical therapy needs after discharge from the hospital   Interdisciplinary Plan of Care Collaboration   IDT Collaboration with  Nursing;Occupational Therapist   Patient Position at End of Therapy In Bed;Call Light within Reach;Tray Table within Reach;Other (Comments)  (sitter present)   Session Information   Date / Session Number  10/24- eval only

## 2022-10-24 NOTE — PROGRESS NOTES
Bedside report received from PENNY Garcia. Assumed care of pt. Pt A/A/O x1. Pt tearful and anxious. Asking if her cat is ok. States she does not know why she is here. Reassured pt that cat was safe with her  and explained reason for admit. 1:1 sitter in place. Pt denies pain or discomfort at this time. Spo2 97% on 4L via NC. Per  this is her baseline. Purewick in place. Bed in low position with wheels locked. Bed alarm on. Sitter at bedside.

## 2022-10-24 NOTE — CONSULTS
"PSYCHIATRIC INTAKE EVALUATION(new)  Reason for admission: overdose  Reason for consult:legal hold eval  Requesting Provider:      Dr. Spring Adamson  Legal Hold Status on Admission:            Chart reviewed.         *HPI:       This is a 62-year-old female with a history of liver transplant on immunosuppression, pulmonary hypertension, chronic heart failure and seizures who presented to the ER after an apparent opioid overdose.  Psychiatry consulted for concern for suicide attempt.  Initial psychiatric interview was unsuccessful due to patient's altered mental status.    Today, patient is alert and oriented to person and place and year.  She denies knowledge of how she arrived at the hospital or the events preceding her hospitalization.  Her primary concerns are her cat \"Rico\" and asks numerously about his welfare and whereabouts.  Denies any knowledge of previous psychiatric diagnosis or history.  Denies knowledge of previous medical comorbidities, but does recall that she had previously fallen and hit her head, thus complains of memory issues.    Denies previous suicide attempts or previous suicidal ideation or history of depression.  On discussion of her apparent overdose, she states that sometimes she forgets how many pills she takes and may take an additional dose    Per :  States that over the past month since her last hospitalization due to GI fell and intracranial bleed, patient has been more irritable and depressed.  She has made statements such as \"I do not want to live anymore\" but denies witnessing her endorse suicidal intent or plan.   denies past suicide attempts.  States that several years ago she fell in [place] and hit her head.  Since this time, she has suffered from memory loss and affective changes.   does not feel that she attempted suicide, but rather miscalculated how many medications she has taken due to her memory loss.     endorses that patient's cat is an " "important part of her life and is currently doing well at home.    She is seen by Dr. Miller for neurology.   reports that he gave nursing staff copy of her psychological and neurological medical records.          PSYCHIATRIC REVIEW OF SYSTEMS:      MOOD SYMPTOMS:   Pertinent positives - sad, mood swings, irritable and apathetic      ANXIETY:   Pertinent positives - excessive worry    SLEEP:   Pertinent negatives - no difficulty falling asleep     PSYCHOMOTOR/ENERGY:   Pertinent positives - decreased energy    Pertinent negatives - no increased energy    EATING:   Pertinent negatives: no decreased appetite and no increased appetite    COGNITIVE:   Pertinent positives: preoccupation    Pertinent negatives: no obsessions, no compulsions, no hopelessness and no shame     BEHAVIOR:   PSYCHOSIS:   Pertinent negatives - auditory hallucinations, visual hallucinations, delusions, ideas of reference and paranoia  SOCIAL:   SENSORY:            Medical ROS (as pertinent):     Review of Systems   Constitutional:  Negative for decreased appetite.   Psychiatric/Behavioral:  Negative for paranoia.          *Psychiatric Examination:  Vitals:     General:   - Grooming and hygiene: Casual  - Apparent distress: NAD   - Behavior: Tense  - Eye Contact:  Good  - no psychomotor agitation or retardation   - Participation: Active verbal participation, Attentive, Engaged, and Open to feedback  Orientation: Oriented to person place and year.  Disoriented to exact date and purpose of hospitalization  Mood: \"I am worried about my cat\"  Affect: Congruent with content, Congruent to stated mood, Labile, and Anxious  Thought Process: Circumstantial  Thought Content: Preoccupation with welfare of her cat  Perception: Denies auditory or visual hallucinations. No delusions noted Within normal limits  Attention span and concentration: Impaired as evidenced by inability to count backwards from 100 by 7 or from 60 by 3  Speech:Clear with normal " "rate and rhythm  Language: Appropriate spontaneous, comprehends spoken commands, and fluent  Insight: Poor  Judgment:  Poor  Recent and remote memory: Impaired delayed recall as evidenced by inability to remember 3 words after 5 minutes.  Impaired memory of recent events.  Impaired long-term memory as evidenced by inability to recall current president.  Frequently repeats questions and does not recall provider's response    Past Medical History:   Past Medical History:   Diagnosis Date    Anemia     Cardiomegaly     Cataract     bilateral IOL    Chronic obstructive pulmonary disease (HCC)     Cirrhosis ([facility]) 12/2011    Status [place] [facility] [place]    CKD (chronic kidney disease) stage 3, GFR 30-59 ml/min ([facility])     Diabetes ([facility])     reports hx of, resolved w/weight loss. 6/10/22 pt reports recently told she has diabetes    Emphysema of lung ([facility])     Fracture of left foot     GERD (gastroesophageal reflux disease)          H/O Clostridium difficile infection 02/17/2017    reports \"16 months ago\". Current stool sample 01-26-17 neg    Heart burn     Hemorrhagic disorder ([facility])     \"low platelets\" bruises easily     Hiatus hernia syndrome     High cholesterol     Hypertension     Hypothyroid     Indigestion     Jaundice 2009    Liver transplanted ([facility])     Low back pain 02/17/2017    and left foot, 7/10    Memory difficulty     short and long term since head injury 8/2021    Mild aortic regurgitation and aortic valve sclerosis      On home oxygen therapy     5 liters, Dr. Gaming    Platelet disorder ([facility])     low platelets    Pneumonia     aspiration,     Psychiatric disorder     Mood disorder    Pulmonary hypertension ([facility])         S/P cholecystectomy     Seizure ([facility]) 05/29/2022    since head injury8/ 2021    Shoulder pain     right    Small bowel obstruction ([facility])     01-    Splenomegaly     VRE (vancomycin-resistant Enterococci)     02-17-17, pt declines knowledge of this        Past " Psychiatric History:  Previous Diagnosis:  Current meds:  Previous med trials:  Hospitalizations:  Suicide attempts/SIB:  Outpatient services:  Access to guns:  Abuse/trauma hx:  Legal hx:    Family Hx: No family history of psychiatric illness per    Social Hx:   Drugs:   Alcohol:    Nicotine:       Current Medications:  Current Facility-Administered Medications   Medication Dose Route Frequency Provider Last Rate Last Admin    labetalol (NORMODYNE/TRANDATE) injection 10 mg  10 mg Intravenous Q4HRS PRN Fabiano Navarrete M.D.        ondansetron (ZOFRAN) syringe/vial injection 4 mg  4 mg Intravenous Q4HRS PRN Fabinao Navarrete M.D.        ondansetron (ZOFRAN ODT) dispertab 4 mg  4 mg Oral Q4HRS PRN Fabiano Navarrete M.D.        promethazine (PHENERGAN) tablet 12.5-25 mg  12.5-25 mg Oral Q4HRS PRN Fabiano Navarrete M.D.        promethazine (PHENERGAN) suppository 12.5-25 mg  12.5-25 mg Rectal Q4HRS PRN Fabiano Navarrete M.D.        prochlorperazine (COMPAZINE) injection 5-10 mg  5-10 mg Intravenous Q4HRS PRN Fabiano Navarrete M.D.        mycophenolate (CELLCEPT) capsule 250 mg  250 mg Oral BID Fabiano Navarrete M.D.   250 mg at 10/24/22 0607    pravastatin (PRAVACHOL) tablet 20 mg  20 mg Oral DAILY Fabiano Navarrete M.D.   20 mg at 10/24/22 0604    lacosamide (VIMPAT) tablet 100 mg  100 mg Oral BID Fabiano Navarrete M.D.   100 mg at 10/24/22 0603    levETIRAcetam (KEPPRA) tablet 1,500 mg  1,500 mg Oral Q12HRS Fabiano Navarrete M.D.   1,500 mg at 10/24/22 0603    metoclopramide (REGLAN) tablet 10 mg  10 mg Oral BID Fabiano Navarrete M.D.   10 mg at 10/24/22 0603    tacrolimus (PROGRAF) capsule 2 mg  2 mg Oral BID Fabiano Navarrete M.D.   2 mg at 10/24/22 0604    acetaminophen (Tylenol) tablet 650 mg  650 mg Oral Q6HRS PRN Yoselin Yen D.O.        senna-docusate (PERICOLACE or SENOKOT S) 8.6-50 MG per tablet 2 Tablet  2 Tablet Oral BID PRN Yoselin Yen D.O.        And     polyethylene glycol/lytes (MIRALAX) PACKET 1 Packet  1 Packet Oral QDAY PRN Yoselin Fabara, D.O.        And    magnesium hydroxide (MILK OF MAGNESIA) suspension 30 mL  30 mL Oral QDAY PRN Yoselin Fabara, D.O.        And    bisacodyl (DULCOLAX) suppository 10 mg  10 mg Rectal QDAY PRN Yoselin Fabara, D.O.        tadalafil (CIALIS) TABS 20 mg  20 mg Oral DAILY Yoselin Fabara, D.O.   20 mg at 10/24/22 0600    ambrisentan (LETAIRIS) TABS 10 mg  10 mg Oral DAILY Yoselin Fabara, D.O.   10 mg at 10/24/22 0600        Allergies:  Nsaids, Rhubarb, Other drug, Tylenol, and Vancomycin       Labs personally reviewed:   Recent Results (from the past 72 hour(s))   Blood Alcohol    Collection Time: 10/22/22  9:42 PM   Result Value Ref Range    Diagnostic Alcohol <10.1 <10.1 mg/dL   CBC WITH DIFFERENTIAL    Collection Time: 10/22/22  9:42 PM   Result Value Ref Range    WBC 2.3 (L) 4.8 - 10.8 K/uL    RBC 3.88 (L) 4.20 - 5.40 M/uL    Hemoglobin 11.4 (L) 12.0 - 16.0 g/dL    Hematocrit 37.6 37.0 - 47.0 %    MCV 96.9 81.4 - 97.8 fL    MCH 29.4 27.0 - 33.0 pg    MCHC 30.3 (L) 33.6 - 35.0 g/dL    RDW 48.5 35.9 - 50.0 fL    Platelet Count 102 (L) 164 - 446 K/uL    MPV 8.6 (L) 9.0 - 12.9 fL    Neutrophils-Polys 45.80 44.00 - 72.00 %    Lymphocytes 40.90 22.00 - 41.00 %    Monocytes 7.60 0.00 - 13.40 %    Eosinophils 4.90 0.00 - 6.90 %    Basophils 0.40 0.00 - 1.80 %    Immature Granulocytes 0.40 0.00 - 0.90 %    Nucleated RBC 0.00 /100 WBC    Neutrophils (Absolute) 1.03 (L) 2.00 - 7.15 K/uL    Lymphs (Absolute) 0.92 (L) 1.00 - 4.80 K/uL    Monos (Absolute) 0.17 0.00 - 0.85 K/uL    Eos (Absolute) 0.11 0.00 - 0.51 K/uL    Baso (Absolute) 0.01 0.00 - 0.12 K/uL    Immature Granulocytes (abs) 0.01 0.00 - 0.11 K/uL    NRBC (Absolute) 0.00 K/uL   COMP METABOLIC PANEL    Collection Time: 10/22/22  9:42 PM   Result Value Ref Range    Sodium 138 135 - 145 mmol/L    Potassium 4.4 3.6 - 5.5 mmol/L    Chloride 101 96 - 112 mmol/L    Co2 31 20 - 33 mmol/L     Anion Gap 6.0 (L) 7.0 - 16.0    Glucose 142 (H) 65 - 99 mg/dL    Bun 12 8 - 22 mg/dL    Creatinine 0.84 0.50 - 1.40 mg/dL    Calcium 9.1 8.5 - 10.5 mg/dL    AST(SGOT) 21 12 - 45 U/L    ALT(SGPT) 13 2 - 50 U/L    Alkaline Phosphatase 73 30 - 99 U/L    Total Bilirubin 0.2 0.1 - 1.5 mg/dL    Albumin 3.4 3.2 - 4.9 g/dL    Total Protein 5.8 (L) 6.0 - 8.2 g/dL    Globulin 2.4 1.9 - 3.5 g/dL    A-G Ratio 1.4 g/dL   Acetaminophen Level    Collection Time: 10/22/22  9:42 PM   Result Value Ref Range    Acetaminophen -Tylenol <5.0 (L) 10.0 - 30.0 ug/mL   SALICYLATE LEVEL    Collection Time: 10/22/22  9:42 PM   Result Value Ref Range    Salicylates, Quant. <1.0 (L) 15.0 - 25.0 mg/dL   ESTIMATED GFR    Collection Time: 10/22/22  9:42 PM   Result Value Ref Range    GFR (CKD-EPI) 78 >60 mL/min/1.73 m 2   MAGNESIUM    Collection Time: 10/22/22  9:42 PM   Result Value Ref Range    Magnesium 1.8 1.5 - 2.5 mg/dL   PHOSPHORUS    Collection Time: 10/22/22  9:42 PM   Result Value Ref Range    Phosphorus 4.5 2.5 - 4.5 mg/dL   TSH WITH REFLEX TO FT4    Collection Time: 10/22/22  9:42 PM   Result Value Ref Range    TSH 1.130 0.380 - 5.330 uIU/mL   EKG (NOW)    Collection Time: 10/22/22  9:59 PM   Result Value Ref Range    Report       [facility]    Test Date:  2022-10-22  Pt Name:    VICK LI             Department: ER  MRN:        [id number]                      Room:       [facility]  Gender:     Female                       Technician: [id number]  :        1960                   Requested By:JOSH HERR  Order #:    [id number]                    Reading MD: Josh Herr    Measurements  Intervals                                Axis  Rate:       85                           P:          66  NM:         49                           QRS:        42  QRSD:       99                           T:          63  QT:         393  QTc:        468    Interpretive Statements  Sinus rhythm  Short NM  interval  Low voltage, extremity leads  Compared to ECG 09/28/2022 05:27:14  Short PA interval now present  Low QRS voltage now present  Intraventricular conduction delay no longer present  Electronically Signed On 10- 23:24:57 PDT by Jr Tamez     AMMONIA    Collection Time: 10/23/22  3:51 AM   Result Value Ref Range    Ammonia 22 11 - 45 umol/L   LACTIC ACID    Collection Time: 10/23/22  3:51 AM   Result Value Ref Range    Lactic Acid 0.5 0.5 - 2.0 mmol/L   LIPASE    Collection Time: 10/23/22  3:51 AM   Result Value Ref Range    Lipase 6 (L) 11 - 82 U/L   VITAMIN B12    Collection Time: 10/23/22  3:51 AM   Result Value Ref Range    Vitamin B12 -True Cobalamin 519 211 - 911 pg/mL   URINE DRUG SCREEN    Collection Time: 10/23/22  9:00 PM   Result Value Ref Range    Amphetamines Urine Negative Negative    Barbiturates Negative Negative    Benzodiazepines Positive (A) Negative    Cocaine Metabolite Negative Negative    Methadone Negative Negative    Opiates Positive (A) Negative    Oxycodone Positive (A) Negative    Phencyclidine -Pcp Negative Negative    Propoxyphene Negative Negative    Cannabinoid Metab Negative Negative   CBC WITHOUT DIFFERENTIAL    Collection Time: 10/24/22 12:14 AM   Result Value Ref Range    WBC 2.7 (L) 4.8 - 10.8 K/uL    RBC 3.43 (L) 4.20 - 5.40 M/uL    Hemoglobin 10.2 (L) 12.0 - 16.0 g/dL    Hematocrit 32.7 (L) 37.0 - 47.0 %    MCV 95.3 81.4 - 97.8 fL    MCH 29.7 27.0 - 33.0 pg    MCHC 31.2 (L) 33.6 - 35.0 g/dL    RDW 47.1 35.9 - 50.0 fL    Platelet Count 100 (L) 164 - 446 K/uL    MPV 9.4 9.0 - 12.9 fL           EKG:   Brain Imaging:   EEG:           Assessment:  This is a very pleasant 62-year-old female with a history of TBI status post craniectomy and liver transplant who presented to the ER with an apparent overdose on opioids.  Initial suspicion that opioid overdose was intentional due to concerns regarding patient's mood decline over the past month.  However, no evidence  of patient endorsing plan or intent for suicidal ideation other than vague statements of discomfort and not wanting to endure them.  Patient does suffer, however, from significant cognitive decline and memory impairment from traumatic brain injury as well as emotional lability.  Both patient and  confirm a high likelihood that patient may have forgotten how many dosage of opioids that she took and accidentally overdosed.    At this time, there is insufficient evidence to substantiate a legal hold due to danger to self.  Consequently, patient is psychiatrically cleared for discharge pending medical clearance    Dx:  -Unintentional overdose secondary to cognitive impairment  -lability secondary to TBI  -unspecified neurocognitive disorder      Medical:  -Pulmonary hypertension  -History of subdural hematoma  -Adrenal insufficiency  -History of liver transplant      Plan:  Legal hold: Discontinue.  No restrictions applied.   Psychotropic medications  Coordinate with neurology concerning transition from Keppra to Depakote for emotional lability.  Psychiatrically, recommend depakote over keppra for emotional lability  Labs ordered/reviewed  EKG ordered/reviewed  Old records ordered/reviewed/summarized  Collateral obtained from   Obtain paper neuro and psych records    Psychiatry will sign off    Thank you for the consult.       This note was created using voice recognition software (Dragon). The accuracy of the dictation is limited by the abilities of the software. I have reviewed the note prior to signing. However, error related to voice recognition software and /or scribes may still exist and should be interpreted within the appropriate context.

## 2022-10-24 NOTE — PROGRESS NOTES
Received report from morning RN. Assumed care for this patient. 1:1 sitter in placed. Possible intentional overdose. Psych consult ordered, Neuro check q 4. Disoriented x 4. Responsive to painful stimuli.Spoke with  prior to bedtime. 4 lpm via nc of oxygen is the patient's baseline. Jesenia care done, purewick in placed. Oral care done. Bed in lowest position. Bed locked, side rails up x 3, continuous pulse ox, call light and personal items within reached.

## 2022-10-24 NOTE — CARE PLAN
The patient is Stable - Low risk of patient condition declining or worsening    Shift Goals  Clinical Goals: Safety, neuro check q 4, monitor v/s, pain mgt  Patient Goals: sarah  Family Goals: safety    Problem: Knowledge Deficit - Standard  Goal: Patient and family/care givers will demonstrate understanding of plan of care, disease process/condition, diagnostic tests and medications  Outcome: Progressing     Problem: Skin Integrity  Goal: Skin integrity is maintained or improved  Outcome: Progressing     Problem: Fall Risk  Goal: Patient will remain free from falls  Outcome: Progressing       Progress made toward(s) clinical / shift goals:  Received report from morning RN. Assumed care for this patient. 1:1 sitter in placed. Possible intentional overdose. Psych consult ordered, Neuro check q 4. Disoriented x 4. Responsive to painful stimuli.Spoke with  prior to bedtime. 4 lpm via nc of oxygen is the patient's baseline. Jesenia care done, purewick in placed. Oral care done. Bed in lowest position. Bed locked, side rails up x 3, continuous pulse ox, call light and personal items within reached.     Patient is not progressing towards the following goals:

## 2022-10-24 NOTE — CARE PLAN
The patient is Stable - Low risk of patient condition declining or worsening    Shift Goals  Clinical Goals: Safety, Neuro checks,  Patient Goals: Rest  Family Goals: safety    Progress made toward(s) clinical / shift goals:    Problem: Knowledge Deficit - Standard  Goal: Patient and family/care givers will demonstrate understanding of plan of care, disease process/condition, diagnostic tests and medications  Outcome: Progressing     Problem: Skin Integrity  Goal: Skin integrity is maintained or improved  Outcome: Progressing     Problem: Fall Risk  Goal: Patient will remain free from falls  Outcome: Progressing     Problem: Pain - Standard  Goal: Alleviation of pain or a reduction in pain to the patient’s comfort goal  Outcome: Progressing       Patient is not progressing towards the following goals:

## 2022-10-24 NOTE — DIETARY
"Nutrition services: Day 1 of admit.  Roxana Cuba is a 62 y.o. female with admitting DX of Acute encephalopathy.  Consult received for BMI <19.    Assessment:  Height: 175.3 cm (5' 9\")  Weight: 53.9 kg (118 lb 13.3 oz)  Body mass index is 17.55 kg/m²., BMI classification: Underweight  Diet/Intake: Regular    Evaluation:   Pt admitted with AMS. Stated she has been eating well, though  states not as well as she could be eating. Observed pt ate about 50% of lunch today, but breakfast was refused per flow sheets. Pt dislikes Boost, but agreed to addition of vanilla milkshake.  Pt does appear thin with hollowing at orbitals and temples.  Per chart review, pt has had 20.6% weight loss in the past year with weight of 67.9 kg recorded on 10/28/21.    Malnutrition Risk: Pt meets criteria for severe malnutrition in context of chronic illness as evidenced by 20.6% weight loss x 1 year, severe muscle loss at temples, and fat loss at orbitals.    Recommendations/Plan:  Ready care shakes with lunch and dinner.   Encourage intake of meals >50%.  Document intake of all meals as % taken in ADL's to provide interdisciplinary communication across all shifts.   Monitor weight.  Nutrition rep will continue to see patient for ongoing meal and snack preferences.     RD following      "

## 2022-10-24 NOTE — PROGRESS NOTES
[facility] Medicine Daily Progress Note    Date of Service  10/24/2022    Chief Complaint  Roxana Cuba is a 62 y.o. 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    Patient was given Narcan in route concern for possible opioid overdose. In the ED, Tylenol <5, alcohol <10.1, salicylates <1. EKG NSR. CXR neg. CT head relatively unchanged from previous, subdural hematoma 5mm in maximal thickness, increased from 4mm 10/10.    Of note on prior admission for subdural hematoma, patient refused SNF and was discharged home with home health.  Patient has history of recurrent falls resulting in fractures and as noted in last admission subdural hematoma.    Interval Problem Update  Spoke with the patient's  over the phone who states the patient has been depressed, but did not comment on any suicidal ideation or plan.  However he did note that her morphine ER and oxycodone bottles are less full than normal.  He did not count the pills so he is unclear how much she actually consumed.    He does arrange her medications, however for her pain medications she is the one to manage them.    He states she is on this pain medication for her history of pelvic fracture, previous history of craniotomy.    Patient is still altered, she is able to tell me she is in the hospital and the name of her  but otherwise cannot answer my questions and dozes off.  Vitals are stable, patient is in no respiratory  distress.    Given his concern for possible suicide attempt, I will place patient on a legal hold, psychiatry consulted.  Forms filled out.  10/25:  patient upset by staying in room, demanding another room, a airloss bed, which was ordered.  Exam wnl.  Remains on oxygen, did not mention her medication overdose.    I have discussed this patient's plan of care and discharge plan at IDT rounds today with Case Management, Nursing, Nursing leadership, and other members of the IDT team.    Consultants/Specialty  None    Code Status  Full Code    Disposition  Patient is not medically cleared for discharge.   Anticipate discharge to  TBD .  I have placed the appropriate orders for post-discharge needs.    Review of Systems  Review of Systems   Unable to perform ROS: Mental status change      Physical Exam  Temp:  [36.2 °C (97.1 °F)-36.6 °C (97.8 °F)] 36.3 °C (97.3 °F)  Pulse:  [71-82] 75  Resp:  [17-18] 18  BP: (109-127)/(42-75) 116/63  SpO2:  [93 %-99 %] 97 %    Physical Exam  Vitals and nursing note reviewed.   Constitutional:       General: She is not in acute distress.     Appearance: She is ill-appearing.   HENT:      Head: Normocephalic and atraumatic.      Mouth/Throat:      Mouth: Mucous membranes are moist.      Pharynx: No oropharyngeal exudate.   Eyes:      Extraocular Movements: Extraocular movements intact.      Pupils: Pupils are equal, round, and reactive to light.   Cardiovascular:      Rate and Rhythm: Normal rate and regular rhythm.      Pulses: Normal pulses.      Heart sounds: No murmur heard.    No friction rub. No gallop.   Pulmonary:      Effort: Pulmonary effort is normal. No respiratory distress.      Breath sounds: No wheezing, rhonchi or rales.   Abdominal:      General: Bowel sounds are normal. There is no distension.      Palpations: Abdomen is soft. There is no mass.      Tenderness: There is no abdominal tenderness.   Musculoskeletal:         General: No swelling or tenderness. Normal range of  motion.      Cervical back: Normal range of motion. No rigidity. No muscular tenderness.      Right lower leg: No edema.      Left lower leg: No edema.   Skin:     General: Skin is warm and dry.      Capillary Refill: Capillary refill takes less than 2 seconds.      Findings: No erythema or rash.   Neurological:      General: No focal deficit present.      Mental Status: She is alert. She is disoriented.      Motor: No weakness.      Gait: Gait normal.       Fluids    Intake/Output Summary (Last 24 hours) at 10/24/2022 2057  Last data filed at 10/24/2022 1530  Gross per 24 hour   Intake 320 ml   Output 500 ml   Net -180 ml       Laboratory  Recent Labs     10/22/22  2142 10/24/22  0014   WBC 2.3* 2.7*   RBC 3.88* 3.43*   HEMOGLOBIN 11.4* 10.2*   HEMATOCRIT 37.6 32.7*   MCV 96.9 95.3   MCH 29.4 29.7   MCHC 30.3* 31.2*   RDW 48.5 47.1   PLATELETCT 102* 100*   MPV 8.6* 9.4     Recent Labs     10/22/22  2142   SODIUM 138   POTASSIUM 4.4   CHLORIDE 101   CO2 31   GLUCOSE 142*   BUN 12   CREATININE 0.84   CALCIUM 9.1                   Imaging  CT-HEAD W/O   Final Result      1. No significant change in small right subdural hematoma.   2. No new intracranial abnormality.         DX-CHEST-PORTABLE (1 VIEW)   Final Result      No acute cardiopulmonary abnormality.           Assessment/Plan  * Acute encephalopathy- (present on admission)  Assessment & Plan  Possible accidental opioid overdose as patient noted to have pinpoint pupils en route to ED; unable to assess on admission as patient actively resisting opening of eyes. Chronic opioid use at home. Received Narcan x1 by EMS. CT head relatively unchanged from 10/10. GCS 13 (E 3, 4 V, 6 M).    -Received additional Narcan 0.4mg after admission  -Ordered ammonia, LA, UA, UDS, lipase, B12, TSH, and thiamine    Debility- (present on admission)  Assessment & Plan  BMI 17.55.    -PT/OT  -Nutrition consult    Seizures (HCC)- (present on admission)  Assessment & Plan  -Continue home  lacosamide and levetiracetam    SDH (subdural hematoma)- (present on admission)  Assessment & Plan  Admission 09/28 to 10/03 for SDH. Initial CT head during that admission with SDH 7mm in maximal thickness, with subsequent CT head 10/10 showing 4mm and most recent 5mm.    -CTM    Adrenal insufficiency (HCC)- (present on admission)  Assessment & Plan  -Fludrocortisone discontinued by pharmacy as not on formulary; needs to be brought in    Pulmonary hypertension (HCC)- (present on admission)  Assessment & Plan  -Home ambrisentan and tadalafil discontinued by pharmacy as not on formulary; needs to be brought in    Chronic respiratory failure (HCC)- (present on admission)  Assessment & Plan  On 4L at home. Without increased oxygen demand. CXR neg.    -RT protocol  -CTM    History of liver transplant (HCC)- (present on admission)  Assessment & Plan  -Continue home tacrolimus and mycophenolate       VTE prophylaxis: SCDs/TEDs    I have performed a physical exam and reviewed and updated ROS and Plan today (10/24/2022). In review of yesterday's note (10/23/2022), there are no changes except as documented above.

## 2022-10-24 NOTE — DISCHARGE PLANNING
HTH/SCP TCN chart review completed. Noted patient was seen by psychiatry. Collaborated with discharge planning team, MISTI Macedo.      No  new TCN needs identified, as choice for SNF level of care previously obtained. Although appreciate PT/ OT consults now present with current PT recommendations for resumption of HH services and OT documenting anticipating no further needs. ST also saw patient this day, with recommendations for a regular, thin liquid diet anticipating no further needs in the setting of patient improved ALANIS.     TCN to follow with CM regarding resumption of HH (noted patient may have had [facility] prior to acute hospitalization). TCN will continue to follow and collaborate with discharge planning team as additional post acute needs arise. Thank you.    Previously completed:  PT with recommendations for resumption of HH services/ OT anticipated no further needs/ ST anticipated no further needs  Choice obtained: SNF, IRF.   [facility] referral sent 10/24/2022. PT/OT/ST recommendations updated this day, current documentation suggests discharge plan may now be home with resumption of HH services

## 2022-10-24 NOTE — THERAPY
Speech Language Pathology  Daily Treatment     Patient Name: Roxana Cuba  Age:  62 y.o., Sex:  female  Medical Record #: [id number]  Today's Date: 10/24/2022     Precautions: Fall Risk  Comments: recent R SHIMA    Subjective  Patient seen on this date for dysphagia reassessment. Pt remains NPO with no source of nutrition but appears to have significant improvement in ALANIS. Pt oriented x2-3 with confusion regarding reason for admission and month. Speech intact. Pt tearful during session, concerned about her cat, and requesting to call  after the session. Sitter stepped out for session but returned just prior to session ending.     Assessment  PO trials were administered and consisted of 8 oz thin liquids (cup/straw) and regular solids. Pt presented with no clinical signs of oropharyngeal dysphagia or s/sx concerning for aspiration. Voice remained clear. Mastication functional for regular solids.     Recommendations  Regular/thin liquids diet  SLP following 1-2 likely to ensure diet tolerance given confusion    Plan  Continue current treatment plan.    Discharge Recommendations: Anticipate that the patient will have no further speech therapy needs after discharge from the hospital       10/24/22 0913   Vitals   O2 (LPM) 4   O2 Delivery Device Silicone Nasal Cannula   Short Term Goals   Short Term Goal # 1 Pt will participate in prefeeding trials 1:1 with SLP with no s/sx of aspiration and min cues.   Goal Outcome # 1 Goal met, new goal added   Short Term Goal # 1 B  Patient will consume a RG7/TN0 diet with no overt s/sx of aspiration.

## 2022-10-24 NOTE — THERAPY
"Occupational Therapy   Initial Evaluation     Patient Name: Roxana Cuba  Age:  62 y.o., Sex:  female  Medical Record #: [id number]  Today's Date: 10/24/2022     Precautions  Precautions: (P) Fall Risk  Comments: recent R SHIMA    Assessment    Patient is 62 y.o. female admitted for AMS, acute encephalopathy, debility, seizures, and history of SDH. Per patient she normally requires assistance with functional mobility and ADLs from spouse and has been using a wheelchair at home for mobility. Pt able to transfer to/from [facility] with supervision/slight CGA as well as able to complete toileting without assistance. Anticipate pt is at her functional baseline, no further OT needs identified will complete order at this time.      Plan    Recommend Occupational Therapy for Evaluation only.    DC Equipment Recommendations: (P) None  Discharge Recommendations: (P) Anticipate that the patient will have no further occupational therapy needs after discharge from the hospital     Subjective    \"I don't know\"     Objective       10/24/22 1122   Prior Living Situation   Prior Services Skilled Home Health Services   Housing / Facility 1 Story House   Steps Into Home 0   Steps In Home 0   Bathroom Set up Bathtub / Shower Combination   Equipment Owned Front-Wheel Walker;Wheelchair   Lives with - Patient's Self Care Capacity Spouse   Comments Spouse assists with care   Prior Level of ADL Function   Self Feeding Independent   Grooming / Hygiene Independent   Bathing Requires Assist   Dressing Requires Assist   Toileting Requires Assist   Prior Level of IADL Function   Medication Management Requires Assist   Laundry Requires Assist   Kitchen Mobility Requires Assist   Finances Requires Assist   Home Management Requires Assist   Driving / Transportation Relatives / Others Provide Transportation   Occupation (Pre-Hospital Vocational) Retired Due To Disability   History of Falls   History of Falls Yes   Precautions   Precautions Fall Risk "   Cognition    Cognition / Consciousness X   Level of Consciousness Alert   Ability To Follow Commands 1 Step   Safety Awareness Impaired   New Learning Impaired   Comments Intermittently labile, confused   Strength Upper Body   Upper Body Strength  WDL   Sensation Upper Body   Upper Extremity Sensation  WDL   Balance Assessment   Sitting Balance (Static) Good   Sitting Balance (Dynamic) Fair   Standing Balance (Static) Fair   Standing Balance (Dynamic) Fair   Weight Shift Sitting Fair   Weight Shift Standing Fair   Comments w/ HHA   Bed Mobility    Supine to Sit Supervised   Sit to Supine Supervised   Scooting Supervised   Rolling Supervised   ADL Assessment   Grooming Supervision   Upper Body Dressing Supervision   Lower Body Dressing Minimal Assist   Toileting Supervision   How much help from another person does the patient currently need...   Putting on and taking off regular lower body clothing? 3   Bathing (including washing, rinsing, and drying)? 3   Toileting, which includes using a toilet, bedpan, or urinal? 3   Putting on and taking off regular upper body clothing? 3   Taking care of personal grooming such as brushing teeth? 3   Eating meals? 3   6 Clicks Daily Activity Score 18   Functional Mobility   Sit to Stand Supervised   Bed, Chair, Wheelchair Transfer Supervised   Toilet Transfers Supervised   Transfer Method Stand Pivot   Mobility bed mobility, to/from BSC, back to bed   Comments w/ HHA   Activity Tolerance   Sitting in Chair 8 min  (BSC)   Sitting Edge of Bed 2 min   Standing 2 min   Education Group   Education Provided Role of Occupational Therapist   Role of Occupational Therapist Patient Response Patient;Acceptance;Explanation   Problem List   Problem List None   Anticipated Discharge Equipment and Recommendations   DC Equipment Recommendations None   Discharge Recommendations Anticipate that the patient will have no further occupational therapy needs after discharge from the hospital    Interdisciplinary Plan of Care Collaboration   IDT Collaboration with  Nursing;Physical Therapist   Patient Position at End of Therapy In Bed;Bed Alarm On;Tray Table within Reach;Call Light within Reach;Phone within Reach;Other (Comments)  (sitter present for potential SI)   Collaboration Comments RN updated

## 2022-10-25 NOTE — PROGRESS NOTES
Received report from morning RN. Assumed care for this patient. Psych sign off. Recommend Depakote instead of Keppra for emotional stability. Neuro check q 4. Oriented to self, drowsy. Oral care done. 3 lpm via nc at 97%. Bed in lowest position. Bed locked, side rails up x 3, continuous pulse ox, call light and personal items within reached. Plan for home health versus rehab/snf.

## 2022-10-25 NOTE — DISCHARGE PLANNING
Meds-to-Beds: Discharge prescription order listed below delivered to charge PENNY Vaughan. Patient counseled. Patient elected to have co-payment billed to patient account.      Current Outpatient Medications   Medication Sig Dispense Refill    Naloxone (NARCAN) 4 MG/0.1ML Liquid Administer 4 mg into affected nostril(S) one time as needed (for narcotic overdose) for up to 1 dose. 2 Each 0      Cheli Petit, Rosa IselaD

## 2022-10-25 NOTE — DISCHARGE PLANNING
HTH/SCP TCN chart review completed. Collaborated with discharge planning team, including MISTI Macedo, and ALVIN Malik. Appreciate discharge planning team following regarding resumption of [facility].     TCN met with patient at bedside. Patient stated she may need assistance with transport home. Collaborated with attending nurse, PENNY Porras who advised she would be calling patient . Voalte received from attending nurse confirming patient need for transport home. Collaborated with ALVIN via teams and PASTOR via voalte advising of need for transportation home, likely need for [facility] in the setting of patient current oxygen needs.     As patient is an anticipated discharge today, TCN remains available to further collaborate with discharge planning team should additional needs arise necessitating TCN involvement. Thank you.     Previously completed:  PT with recommendations for resumption of HH services/ OT anticipated no further needs/ ST anticipated no further needs  Choice obtained: SNF, IRF. Noted patient now to dc home with resumption of HH services through [facility]  [facility] referral sent 10/24/2022    1303 - Appreciate transportation set with [employer] for 1500 today.

## 2022-10-25 NOTE — DISCHARGE PLANNING
Case Management Discharge Planning    Admission Date: 10/22/2022  GMLOS: 3.6  ALOS: 2    6-Clicks ADL Score: 18  6-Clicks Mobility Score: 20      Anticipated Discharge Dispo:  d/c home w/ resume HH    DME Needed: No    Action(s) Taken: Updated Provider/Nurse on Discharge Plan    Escalations Completed: Provider    Medically Clear: Yes    Next Steps: Per rounds, pt will d/c home today.   Pt will resume w/ [facility]. Choice received by DPA.    Later, INS CM indicates pt will need [facility] transport home.     Lsw completed the documents for [employer] and faxed w/ a text out to Maryann.     Lsw completed [employer] and faxed w/ entire [employer] packet.     Barriers to Discharge: Transportation    Is the patient up for discharge tomorrow: No

## 2022-10-25 NOTE — DISCHARGE PLANNING
DC Transport Scheduled    Received request at: 10/25/2022 1233    Transport Company Scheduled:  [facility]    Scheduled Date: 10/25/2022  Scheduled Time: 1500    Destination: Home   [address]    Notified care team of scheduled transport via Voalte.     If there are any changes needed to the DC transportation scheduled, please contact [facility] at ext. [phone] between the hours of 9009-6261 Mon-Fri. If outside those hours, contact the ED Case Manager at ext. [phone].

## 2022-10-25 NOTE — DISCHARGE SUMMARY
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    Patient was given Narcan in route concern for possible opioid overdose. In the ED, Tylenol <5, alcohol <10.1, salicylates <1. EKG NSR. CXR neg. CT head relatively unchanged from previous, subdural hematoma 5mm in maximal thickness, increased from 4mm 10/10.    Of note on prior admission for subdural hematoma, patient refused SNF and was discharged home with home health.  Patient has history of recurrent falls resulting in fractures and as noted in last admission subdural hematoma.    Due to concern of possible overdose, patient was placed on legal hold and psychiatry was consulted.  Her legal hold has been discontinued on 10/24.  Psychiatry recommended transitioning from Keppra to Depakote.  However given patient's history of seizures (well controlled) and liver transplant will avoid transition to Depakote.    Therefore, she is discharged in fair and stable condition to home with organized home healthcare and close outpatient follow-up.    The patient met 2-midnight criteria for an inpatient stay at the time of discharge.    Discharge Date  10/25/22    FOLLOW UP ITEMS POST DISCHARGE  F/u with PCP in 1  week    DISCHARGE DIAGNOSES  Principal Problem:    Acute encephalopathy POA: Yes  Active Problems:    History of liver transplant (HCC) POA: Yes    Chronic respiratory failure (HCC) POA: Yes    Pulmonary hypertension (HCC) POA: Yes      Overview: Initial evaluation at [facility] pre liver transplant, PFO closed w/o       complication, 1/25/2011, then worse PAH and R HF post transplant,       extensive [place] [facility] [place]  with cath and drug trial, responding       to tadalafil and ambrisentan dramatically. Followed there with serial R       heart cath.      3/25/2014: Most recent R heart cath done Dr. Bredna Flores with mild       pulmonary hypertension and relatively high ouput      November 2014: Echocardiogram with normal LV size, LVEF 60-65%. Mild MR,       mild AI, mild TR, RVSP 29-34mmHg.      February 2015: Echocardiogram with mild concentric LVH, LVEF 65-70%. Mild       MR, mild AI, trace TR, RVSP 16mmHg.    Adrenal insufficiency (HCC) POA: Yes    SDH (subdural hematoma) POA: Yes    Seizures (HCC) POA: Yes    Debility POA: Yes    Suicide attempt by drug overdose (HCC) POA: Unknown      Overview: CONCERN FOR             Spoke with the patient's  over the phone who states the patient has       been depressed, but did not comment on any suicidal ideation or plan.        However he did note that her morphine ER and oxycodone bottles are less       full than normal.  He did not count the pills so he is unclear how much       she actually consumed.            He does arrange her medications, however for her pain medications she is       the one to manage them.            He states she is on this pain medication for her history of pelvic       fracture, previous history of craniotomy.            Patient is still altered, she is able to tell me she is in the hospital       and the name of her  but otherwise cannot answer my questions and       dozes off.  Vitals are stable, patient is in no  respiratory distress.            Given his concern for possible suicide attempt, I will place patient on a       legal hold, psychiatry consulted.  Forms filled out.  Resolved Problems:    * No resolved hospital problems. *      FOLLOW UP  Future Appointments   Date Time Provider Department Center   10/27/2022  3:30 PM S LENA CT 1 [facility]   11/4/2022  7:40 AM Halley Levine M.D. [facility] None   3/28/2023 10:20 AM Dl Jimenez M.D. [facility] None     No follow-up provider specified.    MEDICATIONS ON DISCHARGE     Medication List        START taking these medications        Instructions   Naloxone 4 MG/0.1ML Liqd  Commonly known as: Narcan   Administer 4 mg into affected nostril(S) one time as needed (for narcotic overdose) for up to 1 dose.  Dose: 1 Spray            CONTINUE taking these medications        Instructions   ambrisentan 10 MG tablet  Commonly known as: LETAIRIS   TAKE 1 TABLET BY MOUTH EVERY DAY  Dose: 10 mg     clonazePAM 0.5 MG Tabs  Commonly known as: KLONOPIN   Take  by mouth 3 times a day.     lacosamide 100 MG Tabs tablet  Commonly known as: VIMPAT   Take 1 Tablet by mouth 2 times a day for 180 days.  Dose: 100 mg     levetiracetam 750 MG tablet  Commonly known as: KEPPRA   Take 2 Tablets by mouth every 12 hours for 30 days.  Dose: 1,500 mg     metoclopramide 10 MG Tabs  Commonly known as: REGLAN   Take 10 mg by mouth 2 times a day.  Dose: 10 mg     morphine ER 15 MG Tbcr tablet  Commonly known as: MS CONTIN   Take 1 Tablet by mouth every 12 hours for 30 days.  Dose: 15 mg     mycophenolate 250 MG Caps  Commonly known as: CELLCEPT   Take 250 mg by mouth 2 times a day. Indications: Liver Transplant Recipient  Dose: 250 mg     oxyCODONE immediate release 10 MG immediate release tablet  Commonly known as: ROXICODONE   Take 1 Tablet by mouth in the morning, at noon, and at bedtime for 30 days.  Dose: 10 mg     potassium chloride SA 20 MEQ Tbcr  Commonly known as: Kdur   Take  20 mEq by mouth every day.  Dose: 20 mEq     pravastatin 20 MG Tabs  Commonly known as: PRAVACHOL   Take 1 Tablet by mouth every day.  Dose: 20 mg     tacrolimus 1 MG Caps  Commonly known as: PROGRAF   Take 2 Capsules by mouth 2 times a day.  Dose: 2 mg     tadalafil 20 MG tablet  Commonly known as: CIALIS   Take 1 Tablet by mouth every day.  Dose: 20 mg              Allergies  Allergies   Allergen Reactions    Nsaids Unspecified     Can not take due to hx of liver transplant     Rhubarb Anaphylaxis    Other Drug Unspecified     Any medication that may interact with cyclosporine, tacrolimus, sirolimus, or prograf (due to hx of liver transplant)     Tylenol Unspecified     Liver transplant    Vancomycin Unspecified     Red man syndrome       DIET  Orders Placed This Encounter   Procedures    Diet Order Diet: Regular     Standing Status:   Standing     Number of Occurrences:   1     Order Specific Question:   Diet:     Answer:   Regular [1]       ACTIVITY  As tolerated.  Weight bearing as tolerated    CONSULTATIONS  Psychiatry  Neurosurgery  Physiatry    PROCEDURES  none    LABORATORY  Lab Results   Component Value Date    SODIUM 138 10/22/2022    POTASSIUM 4.4 10/22/2022    CHLORIDE 101 10/22/2022    CO2 31 10/22/2022    GLUCOSE 142 (H) 10/22/2022    BUN 12 10/22/2022    CREATININE 0.84 10/22/2022        Lab Results   Component Value Date    WBC 2.7 (L) 10/24/2022    HEMOGLOBIN 10.2 (L) 10/24/2022    HEMATOCRIT 32.7 (L) 10/24/2022    PLATELETCT 100 (L) 10/24/2022        Total time of the discharge process exceeds 41 minutes.

## 2022-10-25 NOTE — PROGRESS NOTES
Patient discharged home with medical transport. Vitals stable. Patient's belongings accounted for and sent home with patient including home medications. Patient's  aware of and agreeable with discharge.

## 2022-10-25 NOTE — DISCHARGE PLANNING
Agency/Facility Name: [facility]  Spoke To: Michelle  Outcome: Pt is on service with [facility] agency services.

## 2022-10-25 NOTE — DISCHARGE PLANNING
Received Choice form at 1030  Agency/Facility Name: [facility] (resume)  Referral sent per Choice form at 1032    1120-   Agency/Facility Name: [facility]  Plan or Request: ALVIN left  for Michelle    1340- Per Kylie at [facility] , accepted

## 2022-10-25 NOTE — PROGRESS NOTES
Assumed care of patient from pm RN at 0700. Patient is AOx4. Bed in lowest position with side rails up. Call light in place. Patient belongings near patient. Patient expresses no needs at this time. Hourly rounding performed.

## 2022-10-25 NOTE — DOCUMENTATION QUERY
[facility]                                                                       Query Response Note      PATIENT:               VICK LI  ACCT #:                  [id number]  MRN:                     [id number]  :                      1960  ADMIT DATE:       10/22/2022 8:50 PM  DISCH DATE:          RESPONDING  PROVIDER #:        [id number]           QUERY TEXT:    Encephalopathy is documented in the Medical Record. Please specify type.    The patient's Clinical Indicators include:  Findings:   --Per H&P, ''acute encephalopathy POA'' stated  --Head CT from 10/23/2022 revealed: ''no significant change in small right subdural hematoma and no new intracranial abnormality''  --Per PN 10/23, ''chronic opioid use at home'' stated  --Per H&P, ''GCS 13'' stated  --Per ER notes on 10/22, ''mildly somnolent but arousable to verbal stimulation'' stated  --UDS from 10/12:  benzodiazepines++, opiates ++, oxycodone++  Treatment:  --Per H&P, patient ''received Narcan x 1 by EMS''  --Head CT  --Per PN 10/23, ''patient received an additional Narcan 0.4mg after admission''  Risk Factors:  --Accidental opioid overdose, chronic respiratory failure, history of liver transplant, subdural hematoma, seizures, remote history of alcoholism    Thank you,  Mattie Reynoso RN, BSN  Clinical   Connect via [employer]  Options provided:   -- Alcoholic encephalopathy   -- Anoxic encephalopathy   -- Due to medications or drugs   -- Hepatic encephalopathy without coma   -- Metabolic encephalopathy   -- Toxic encephalopathy   -- Other type of encephalopathy   -- Unable to determine      Query created by: Mattie Reynoso on 10/24/2022 10:42 AM    RESPONSE TEXT:    Due to medications or drugs       QUERY TEXT:    Based on the diagnostic and clinical findings, can a diagnosis be made?    The patient's clinical indicators  include:  Findings:  --Per RD note on 10/24, ''Pt meets criteria for severe malnutrition in context of chronic illness as evidenced by 20.6% weight loss x 1 year, severe muscle loss at temples, and fat loss at orbitals'' stated  --Per H&P, ''cachectic'' stated  --BMI: 17.55, weight:  53.9kg, height:  1.753m  Treatment:  --RD consult  --Monitor weight  --Ready care shakes with lunch and dinner  --Encourage intake of meals >50%  Risk Factors:  --Accidental overdose of opioids, subdural hemorrhage, liver transplant status, chronic respiratory failure with hypoxia, immunodeficiency due to drugs, cirrhosis of liver, multiple myeloma not having achieved remission    Thank you,  Mattie Reynoso RN, BSN  Clinical   Connect via [employer]  Options provided:   -- Patient has severe protein calorie malnutrition   -- Patient has moderate protein calorie malnutrition   -- Other explanation, please specify   -- Unable to determine      Query created by: Mattie Reynoso on 10/25/2022 9:51 AM    RESPONSE TEXT:    Patient has severe protein calorie malnutrition          Electronically signed by:  ROVERTO DANIELS MD 10/25/2022 10:08 AM

## 2022-10-25 NOTE — CARE PLAN
The patient is Stable - Low risk of patient condition declining or worsening    Shift Goals  Clinical Goals: Safety, neuro check q4, placement  Patient Goals: discharge  Family Goals: discharge      Problem: Knowledge Deficit - Standard  Goal: Patient and family/care givers will demonstrate understanding of plan of care, disease process/condition, diagnostic tests and medications  Outcome: Progressing     Problem: Skin Integrity  Goal: Skin integrity is maintained or improved  Outcome: Progressing     Problem: Fall Risk  Goal: Patient will remain free from falls  Outcome: Progressing     Problem: Pain - Standard  Goal: Alleviation of pain or a reduction in pain to the patient’s comfort goal  Outcome: Progressing       Progress made toward(s) clinical / shift goals:  Received report from morning RN. Assumed care for this patient. Psych sign off. Recommend Depakote instead of Keppra for emotional stability. Neuro check q 4. Oriented to self, drowsy. Oral care done. 3 lpm via nc at 97%. Bed in lowest position. Bed locked, side rails up x 3, continuous pulse ox, call light and personal items within reached. Plan for home health versus rehab/snf.    Patient is not progressing towards the following goals:

## 2022-10-25 NOTE — FACE TO FACE
Face to Face Supporting Documentation - Home Health    The encounter with this patient was in whole or in part the primary reason for home health admission.    Date of encounter:   Patient:                    MRN:                       YOB: 2022  Roxana Cuba  [id number]  1960     Home health to see patient for:  Skilled Nursing care for assessment, interventions & education and Physical Therapy evaluation and treatment    Skilled need for:  New Onset Medical Diagnosis encephalopathy    Skilled nursing interventions to include:  Comment: as above    Homebound status evidenced by:  Needs the assistance of another person in order to leave the home. Leaving home requires a considerable and taxing effort. There is a normal inability to leave the home.    Community Physician to provide follow up care: Halley Levine M.D.     Optional Interventions? No      I certify the face to face encounter for this home health care referral meets the CMS requirements and the encounter/clinical assessment with the patient was, in whole, or in part, for the medical condition(s) listed above, which is the primary reason for home health care. Based on my clinical findings: the service(s) are medically necessary, support the need for home health care, and the homebound criteria are met.  I certify that this patient has had a face to face encounter by myself.  Mila Godinez M.D. - NPI: [id number]

## 2022-10-26 NOTE — PROGRESS NOTES
"Subjective:     Roxana Cuba is a 62 y.o. female who presents for Hospital Follow-up.    POST DISCHARGE CALL:  Discharge Date:10/25/2022   Date of Outreach Call: 10/26/2022  9:39 AM  Now that you're home, how are you doing? Poor  Comment:feels like she pulled a muscle, inguinal area.  reports walking a lot at home, \"30 laps,\" which is more than  usual, since she's been discharged.  Did you receive any new prescriptions? No  Were you able to fill those medications? Yes  How did you fill those prescriptions? *  If not able to fill prescriptions, why? *    Do you have questions about your medications? No  Do you have a follow-up appointment scheduled?Yes  Comment:made appt with other provider in office, d/t  availability  Any issues or paperwork you wish to discuss with your PCP? discuss medication, would like flu shot in-clinic  Does patient qualify for CCM Program? Yes  If patient qualifies, was CCM Program Introduced? Yes  If patient does not qualify, comment? *  Number of Attempts: 1  Current or previous attempts completed within two business days of discharge? Yes  Provided education regarding treatment plan, medication, self-management, ADLs? Yes  Has patient completed Advance Directive? If yes, advise them to bring to appointment. Yes  Care Manager phone number provided? Yes  Is there anything else I can help you with? No  Chief Complaint? drug overdose  Admitting Dx? EMS  Discharge Diagnosis? acute encephalopathy  Additional Comments? *    HPI:   Recently hospitalized for ***    Current medicines (including reconciliation performed today)  Current Outpatient Medications   Medication Sig Dispense Refill    Naloxone (NARCAN) 4 MG/0.1ML Liquid Administer 4 mg into affected nostril(S) one time as needed (for narcotic overdose) for up to 1 dose. 2 Each 0    potassium chloride SA (KDUR) 20 MEQ Tab CR Take 20 mEq by mouth every day.      tacrolimus (PROGRAF) 1 MG Cap Take 2 Capsules by mouth 2 times a day. 60 " Capsule 3    morphine ER (MS CONTIN) 15 MG Tab CR tablet Take 1 Tablet by mouth every 12 hours for 30 days. 60 Tablet 0    oxyCODONE immediate release (ROXICODONE) 10 MG immediate release tablet Take 1 Tablet by mouth in the morning, at noon, and at bedtime for 30 days. 90 Tablet 0    levetiracetam (KEPPRA) 750 MG tablet Take 2 Tablets by mouth every 12 hours for 30 days. 120 Tablet 0    clonazePAM (KLONOPIN) 0.5 MG Tab Take  by mouth 3 times a day.      lacosamide (VIMPAT) 100 MG Tab tablet Take 1 Tablet by mouth 2 times a day for 180 days. 60 Tablet 5    tadalafil (CIALIS) 20 MG tablet Take 1 Tablet by mouth every day. 100 Tablet 3    pravastatin (PRAVACHOL) 20 MG Tab Take 1 Tablet by mouth every day. 100 Tablet 3    metoclopramide (REGLAN) 10 MG Tab Take 10 mg by mouth 2 times a day.      ambrisentan (LETAIRIS) 10 MG tablet TAKE 1 TABLET BY MOUTH EVERY DAY 30 Tablet 11    mycophenolate (CELLCEPT) 250 MG Cap Take 250 mg by mouth 2 times a day. Indications: Liver Transplant Recipient       No current facility-administered medications for this visit.       Allergies:   Nsaids, Rhubarb, Other drug, Tylenol, and Vancomycin    Social History     Tobacco Use    Smoking status: Never    Smokeless tobacco: Never    Tobacco comments:     avoid all tobacco products   Vaping Use    Vaping Use: Never used   Substance Use Topics    Alcohol use: Not Currently     Alcohol/week: 0.0 oz    Drug use: No       ROS:  ***    Objective:     There were no vitals filed for this visit.  There is no height or weight on file to calculate BMI.    Physical Exam:  ***    Assessment and Plan:   There are no diagnoses linked to this encounter.    - Chart and discharge summary were reviewed.   - Hospitalization and results reviewed with patient.   - Medications reviewed including instructions regarding high risk medications, dosing and side effects.  - Recommended Services: {COORDINATION OF SERVICES:35992}  - Advance directive/POLST on file?   {Yes/No:20266}    Follow-up:No follow-ups on file.    Face-to-face transitional care management services with {TCM Complexity:91307} medical decision complexity were provided.     LACE+ Historical Score Over Time (0-28: Low, 29-58: Medium, 59+: High): 79      {                                                      reference text will not save to note  Coding guide   60912        - face-to-face within 14 day        - moderate decision complexity (LACE+ score of 28-58)  28436       - face-to-face within 7 days       - high medical decision complexity (LACE+ score 59+)    :48269}

## 2022-10-26 NOTE — TELEPHONE ENCOUNTER
ESTABLISHED PATIENT PRE-VISIT PLANNING     Patient was NOT contacted to complete PVP.     Note: Patient will not be contacted if there is no indication to call.     1.  Reviewed notes from the last few office visits within the medical group: Yes    2.  If any orders were placed at last visit or intended to be done for this visit (i.e. 6 mos follow-up), do we have Results/Consult Notes?           Labs - Labs were not ordered at last office visit.  Note: If patient appointment is for lab review and patient did not complete labs, check with provider if OK to reschedule patient until labs completed.         Imaging - Imaging was not ordered at last office visit.         Referrals - No referrals were ordered at last office visit.    3. Is this appointment scheduled as a Hospital Follow-Up? Yes, visit was at [facility].     4.  Immunizations were updated in Epic using Reconcile Outside Information activity? Yes    5.  Patient is due for the following Health Maintenance Topics:   Health Maintenance Due   Topic Date Due    IMM ZOSTER VACCINES (2 of 2) 01/29/2019    COVID-19 Vaccine (4 - Booster for Moderna series) 01/04/2022    IMM INFLUENZA (1) 09/01/2022   6.  AHA (Pulse8) form printed for Provider? N/A

## 2022-10-28 NOTE — TELEPHONE ENCOUNTER
MEDICATION REFILL : RO    Caller: Roxana Cuba    Medication Name and Dosage:     AMBRISENTAN 10MG    Please call your pharmacy and have them send us a refill request or speak to a live representative, RX number may have changed.    Medication amount left: 10 DAYS    Preferred Pharmacy:     [facility] (NOW [facility]) - [facility] [id number]    Other questions (Topic): NONE    Callback Number (Will only call for issues): [phone] (home)

## 2022-10-29 PROBLEM — Z71.89 DNR (DO NOT RESUSCITATE) DISCUSSION: Status: ACTIVE | Noted: 2022-01-01

## 2022-10-29 PROBLEM — Z53.09 CONTRAINDICATION TO DEEP VEIN THROMBOSIS (DVT) PROPHYLAXIS: Chronic | Status: ACTIVE | Noted: 2022-01-01

## 2022-10-29 NOTE — CARE PLAN
Transition to comfort care.    Problem: Fall Risk  Goal: Patient will remain free from falls  Outcome: Met     Problem: Pain - Standard  Goal: Alleviation of pain or a reduction in pain to the patient’s comfort goal  Outcome: Met     Problem: Knowledge Deficit - Standard  Goal: Patient and family/care givers will demonstrate understanding of plan of care, disease process/condition, diagnostic tests and medications  Outcome: Met

## 2022-10-29 NOTE — ED TRIAGE NOTES
BIB EMS from home.  called 911 for AMS.   Believes there may have been a fall. Noted to have bruising to RT forehead.   TBI was activated, went to CT, + large bleed with shift.  Upgraded to trauma red for deteriorating mental status.   Initially would withdraw to painful stimuli, then began having decorticate posturing. Pupil changes, R>L.  Intubated, meds given (see MAR).     Report to ICU, pt to CT then ICU in critical condition.

## 2022-10-29 NOTE — PROGRESS NOTES
Trauma / Surgical Daily Progress Note    Date of Service  10/29/2022    Chief Complaint  62 y.o. female admitted 10/29/2022 with fatal SDH    Interval Events  Admitted with fatal SDH. On vent. Family wants comfort care. Orders written.    Review of Systems  Review of Systems   Unable to perform ROS: Intubated      Vital Signs for last 24 hours  Temp:  [36.2 °C (97.2 °F)] 36.2 °C (97.2 °F)  Pulse:  [] 78  Resp:  [12-51] 29  BP: (137-181)/(67-88) 159/73  SpO2:  [85 %-100 %] 98 %    Hemodynamic parameters for last 24 hours       Respiratory Data     Respiration: (!) 29, Pulse Oximetry: 98 %             Physical Exam  Physical Exam  Constitutional:       Comments: intubated   Cardiovascular:      Rate and Rhythm: Normal rate.   Pulmonary:      Effort: Pulmonary effort is normal.       Laboratory  Recent Results (from the past 24 hour(s))   EKG    Collection Time: 10/29/22  7:18 AM   Result Value Ref Range    Report       [facility] Emergency Dept.    Test Date:  2022-10-29  Pt Name:    VICK LI             Department: ER  MRN:        [id number]                      Room:       [facility]  Gender:     Female                       Technician: [id number]  :        1960                   Requested By:ROBERT BRYANT  Order #:    [id number]                    Reading MD:    Measurements  Intervals                                Axis  Rate:       68                           P:          46  VT:         115                          QRS:        41  QRSD:       90                           T:          66  QT:         432  QTc:        460    Interpretive Statements  Sinus rhythm  Borderline short VT interval  Left ventricular hypertrophy  Baseline wander in lead(s) I,II,aVR,aVF,V1,V2,V3  Compared to ECG 10/22/2022 21:59:27  Left ventricular hypertrophy now present     Lactic acid (lactate)    Collection Time: 10/29/22  7:20 AM   Result Value Ref Range    Lactic Acid 1.9 0.5 - 2.0 mmol/L   CBC With  Differential    Collection Time: 10/29/22  7:20 AM   Result Value Ref Range    WBC 14.0 (H) 4.8 - 10.8 K/uL    RBC 4.06 (L) 4.20 - 5.40 M/uL    Hemoglobin 11.9 (L) 12.0 - 16.0 g/dL    Hematocrit 35.7 (L) 37.0 - 47.0 %    MCV 87.9 81.4 - 97.8 fL    MCH 29.3 27.0 - 33.0 pg    MCHC 33.3 (L) 33.6 - 35.0 g/dL    RDW 44.0 35.9 - 50.0 fL    Platelet Count 231 164 - 446 K/uL    MPV 8.5 (L) 9.0 - 12.9 fL    Neutrophils-Polys 85.70 (H) 44.00 - 72.00 %    Lymphocytes 7.20 (L) 22.00 - 41.00 %    Monocytes 6.10 0.00 - 13.40 %    Eosinophils 0.00 0.00 - 6.90 %    Basophils 0.40 0.00 - 1.80 %    Immature Granulocytes 0.60 0.00 - 0.90 %    Nucleated RBC 0.00 /100 WBC    Neutrophils (Absolute) 11.98 (H) 2.00 - 7.15 K/uL    Lymphs (Absolute) 1.00 1.00 - 4.80 K/uL    Monos (Absolute) 0.85 0.00 - 0.85 K/uL    Eos (Absolute) 0.00 0.00 - 0.51 K/uL    Baso (Absolute) 0.05 0.00 - 0.12 K/uL    Immature Granulocytes (abs) 0.09 0.00 - 0.11 K/uL    NRBC (Absolute) 0.00 K/uL   Comp Metabolic Panel    Collection Time: 10/29/22  7:20 AM   Result Value Ref Range    Sodium 137 135 - 145 mmol/L    Potassium 3.6 3.6 - 5.5 mmol/L    Chloride 97 96 - 112 mmol/L    Co2 25 20 - 33 mmol/L    Anion Gap 15.0 7.0 - 16.0    Glucose 246 (H) 65 - 99 mg/dL    Bun 11 8 - 22 mg/dL    Creatinine 0.65 0.50 - 1.40 mg/dL    Calcium 8.9 8.5 - 10.5 mg/dL    AST(SGOT) 103 (H) 12 - 45 U/L    ALT(SGPT) 43 2 - 50 U/L    Alkaline Phosphatase 90 30 - 99 U/L    Total Bilirubin 0.4 0.1 - 1.5 mg/dL    Albumin 4.3 3.2 - 4.9 g/dL    Total Protein 6.6 6.0 - 8.2 g/dL    Globulin 2.3 1.9 - 3.5 g/dL    A-G Ratio 1.9 g/dL   DIAGNOSTIC ALCOHOL    Collection Time: 10/29/22  7:20 AM   Result Value Ref Range    Diagnostic Alcohol <10.1 <10.1 mg/dL   ESTIMATED GFR    Collection Time: 10/29/22  7:20 AM   Result Value Ref Range    GFR (CKD-EPI) 99 >60 mL/min/1.73 m 2   VENOUS BLOOD GAS    Collection Time: 10/29/22  7:45 AM   Result Value Ref Range    Venous Bg Ph 7.43 7.31 - 7.45    Venous Bg  Ph Temp Corrected 7.45 7.31 - 7.45    Venous Bg Pco2 32.8 (L) 41.0 - 51.0 mmHg    Venous Bg Pco2 Temp Corrected 31.7 (L) 41.0 - 51.0 mmHg    Venous Bg Po2 175.9 (H) 25.0 - 40.0 mmHg    Venous Bg Po2 Temp Corrected 171.6 (H) 25.0 - 40.0 mmHg    Venous Bg O2 Saturation 98.2 %    Venous Bg Hco3 22 (L) 24 - 28 mmol/L    Venous Bg Base Excess -2 mmol/L    Body Temp 36.2 Centigrade   PLATELET MAPPING WITH BASIC TEG    Collection Time: 10/29/22  7:50 AM   Result Value Ref Range    Reaction Time Initial-R 5.2 4.6 - 9.1 min    React Time Initial Hep 5.6 4.3 - 8.3 min    Clot Kinetics-K 1.2 0.8 - 2.1 min    Clot Angle-Angle 74.2 63.0 - 78.0 degrees    Maximum Clot Strength-MA 59.8 52.0 - 69.0 mm    TEG Functional Fibrinogen(MA) 18.2 15.0 - 32.0 mm    % Inhibition ADP 34.7 (H) 0.0 - 17.0 %    % Inhibition AA 44.1 (H) 0.0 - 11.0 %    TEG Algorithm Link Algorithm    Urinalysis    Collection Time: 10/29/22  8:01 AM    Specimen: Urine   Result Value Ref Range    Color Yellow     Character Clear     Specific Gravity 1.011 <1.035    Ph 7.5 5.0 - 8.0    Glucose 250 (A) Negative mg/dL    Ketones Trace (A) Negative mg/dL    Protein 100 (A) Negative mg/dL    Bilirubin Negative Negative    Urobilinogen, Urine 0.2 Negative    Nitrite Negative Negative    Leukocyte Esterase Negative Negative    Occult Blood Small (A) Negative    Micro Urine Req Microscopic    URINE MICROSCOPIC (W/UA)    Collection Time: 10/29/22  8:01 AM   Result Value Ref Range    WBC 2-5 /hpf    RBC 2-5 (A) /hpf    Bacteria Moderate (A) None /hpf    Epithelial Cells Negative /hpf    Hyaline Cast 0-2 /lpf       Fluids    Intake/Output Summary (Last 24 hours) at 10/29/2022 0993  Last data filed at 10/29/2022 0755  Gross per 24 hour   Intake 0 ml   Output 0 ml   Net 0 ml       Core Measures & Quality Metrics  Labs reviewed, Medications reviewed and Radiology images reviewed  Holbrook catheter: Critically Ill - Requiring Accurate Measurement of Urinary Output      DVT  Prophylaxis: Contraindicated - High bleeding risk  DVT prophylaxis - mechanical: SCDs  Ulcer prophylaxis: Yes    Assessed for rehab: Patient unable to tolerate rehabilitation therapeutic regimen  RAP Score Total: 0   Blood Alcohol>0.08: yes     Assessment/Plan  * Trauma- (present on admission)  Assessment & Plan  Found down  Trauma Red Activation. Upgrade from TBI.  Brooke Mcallister MD. Trauma Surgery.    Traumatic subdural hematoma, initial encounter- (present on admission)  Assessment & Plan  3.0 cm right holohemispheric subdural hematoma resulting in mass effect and 2.3 cm right to left midline shift.  Right uncal herniation and herniation of the anterior right temporal horn.  Non-operative management. Non survivable injury  Post traumatic pharmacologic seizure prophylaxis for 1 week.  Shankar Ibarra MD, PhD. Neurosurgeon. [facility].   Family made comfort care      Contraindication to deep vein thrombosis (DVT) prophylaxis- (present on admission)  Assessment & Plan  Prophylactic anticoagulation for thrombotic prevention initially contraindicated secondary to elevated bleeding risk.    Cirrhosis (HCC)- (present on admission)  Assessment & Plan  History of liver transplant December 2011  TEG with platelet mapping pending    DNR (do not resuscitate) discussion- (present on admission)  Assessment & Plan  Neurosurgery believes no chance for meaningful recovery.  Discussion with  by Dr Cline, patient is made DNR.    Plan to transition to comfort care, but he is hopeful he can make it to the hospital.          Discussed patient condition with Family, RN, RT, and Pharmacy Desiree Mcallister and Fred.  CRITICAL CARE TIME EXCLUDING PROCEDURES: 35    minutes

## 2022-10-29 NOTE — PROGRESS NOTES
Trauma upgrade for patient in Red Pod at 747.   2 SICU RN to ER at 0753.  Dr Mcallister already at bedside with ERP.     Pupils fixed at 5, no corneal cough gag.   Extensor posturing observed.     C collar applied.

## 2022-10-29 NOTE — ED PROVIDER NOTES
ED Provider Note    CHIEF COMPLAINT  Chief Complaint   Patient presents with    ALOC       HPI  Roxana Cuba is a 62 y.o. female with history of sarcoidosis cirrhosis of the liver status post liver transplant 2011, pulmonary hypertension on Cialis, chronic respiratory failure, multiple falls prior subdural hematoma and prior admissions for encephalopathy.  Patient here today with altered mental status.  Has been will go to wife who was very poorly arousable.  He was unable to wake his wife, she simply was snoring and therefore normal was called.  Upon EMS arrival patient was hypoxic but not on her oxygen with a oxygen level around 85%.  Patient is normally on 4 L at 4 L patient oxygen increased to 96%.  Patient remained poorly responsive and was transferred to our facility.  Patient does have a history of morphine overdose.  Patient unable to provide any further history secondary to altered mental status.  I discussed case with patient's , they had had an argument last night and patient was being very verbally abusive with them.  She wanted a shot of alcohol also gave her a shot of alcohol but no more, apparently the rest of the alcohol is out of reach of patient.  Given patient's history of overdose in the past her medications are locked and are managed by her , she does not have access to any of these medications.  Her  reviewed the medications with me through over the phone, he does not believe any of them appear to be missing or taking in excess.  Patient did have a very slow fall to the ground while ambulating with her walker yesterday, patient's  is not sure if she struck her head but does not think she did.    REVIEW OF SYSTEMS  ROS    See HPI for further details. All other systems are negative.     PAST MEDICAL HISTORY   has a past medical history of Anemia, Cardiomegaly, Cataract, Chronic obstructive pulmonary disease (HCC), Cirrhosis (HCC) (12/2011), CKD (chronic kidney  disease) stage 3, GFR 30-59 ml/min (HCC), Diabetes (HCC), Emphysema of lung (HCC), Fracture of left foot, GERD (gastroesophageal reflux disease), H/O Clostridium difficile infection (02/17/2017), Heart burn, Hemorrhagic disorder (HCC), Hiatus hernia syndrome, High cholesterol, Hypertension, Hypothyroid, Indigestion, Jaundice (2009), Liver transplanted (HCC), Low back pain (02/17/2017), Memory difficulty, Mild aortic regurgitation and aortic valve sclerosis ( ), On home oxygen therapy, Platelet disorder (HCC), Pneumonia, Psychiatric disorder, Pulmonary hypertension (HCC), S/P cholecystectomy, Seizure ([facility]) (05/29/2022), Shoulder pain, Small bowel obstruction (HCC), Splenomegaly, and VRE (vancomycin-resistant Enterococci).    SOCIAL HISTORY  Social History     Tobacco Use    Smoking status: Never    Smokeless tobacco: Never    Tobacco comments:     avoid all tobacco products   Vaping Use    Vaping Use: Never used   Substance and Sexual Activity    Alcohol use: Not Currently     Alcohol/week: 0.0 oz    Drug use: No    Sexual activity: Not on file       SURGICAL HISTORY   has a past surgical history that includes anesth,nose,sinus surgery; makayla by laparoscopy (09/19/2009); exam under anesthesia (11/11/2009); hysterectomy, total abdominal; gastroscopy-endo (03/12/2010); bronchoscopy-endo (05/29/2012); bronchoscopy-endo (06/19/2012); sigmoidoscopy flex (09/26/2012); recovery (02/27/2013); bronchoscopy-endo (11/15/2013); recovery (01/21/2014); recovery (03/24/2014); hemorrhoidectomy (11/11/2009); gastroscopy (09/28/2012); carpal tunnel release; bone marrow aspiration (12/31/2012); bone marrow biopsy, ndl/trocar (12/31/2012); recovery (03/31/2014); other abdominal surgery (12/2011); bone marrow aspiration (03/16/2015); bone marrow biopsy, ndl/trocar (03/16/2015); lung biopsy open (11/2016); tonsillectomy; other abdominal surgery (12/2015); breast biopsy (Left, 02/21/2017); colonoscopy (N/A, 03/27/2017); gastroscopy (N/A,  03/27/2017); gastric bypass laparoscopic (2018); hernia repair; makayla by laparoscopy; mammoplasty reduction; panniculectomy (12/11/2017); other surgical procedure; turbinate reduction (Bilateral, 10/04/2018); nasal reconstruction (Bilateral, 10/04/2018); ventral hernia repair robotic xi (N/A, 11/12/2018); craniotomy (Left, 08/04/2021); upper gi endoscopy,diagnosis (N/A, 02/03/2022); colonoscopy,diagnostic (N/A, 6/21/2022); upper gi endoscopy,diagnosis (N/A, 6/21/2022); and partial hip replacement (Right, 8/28/2022).    CURRENT MEDICATIONS  Home Medications    **Home medications have not yet been reviewed for this encounter**           ALLERGIES  Allergies   Allergen Reactions    Nsaids Unspecified     Can not take due to hx of liver transplant     Rhubarb Anaphylaxis    Other Drug Unspecified     Any medication that may interact with cyclosporine, tacrolimus, sirolimus, or prograf (due to hx of liver transplant)     Tylenol Unspecified     Liver transplant    Vancomycin Unspecified     Red man syndrome       PHYSICAL EXAM  Vitals:    10/29/22 0714   BP: (!) 177/88   Pulse: 66   Resp: (!) 22   Temp: 36.2 °C (97.2 °F)   SpO2: (!) 85%       Physical Exam  Constitutional:       Comments: Snoring, withdraws to pain   HENT:      Head:      Comments: Small chronic appearing cephalhematoma to right forehead     Mouth/Throat:      Mouth: Mucous membranes are dry.   Eyes:      Pupils: Pupils are equal, round, and reactive to light.      Comments: Pupils 4mm   Cardiovascular:      Rate and Rhythm: Normal rate and regular rhythm.   Pulmonary:      Comments: Mildly tachypneic, satting at 85% on room air, 96% on 4 L nasal cannula with good waveform, gag intact  Abdominal:      General: Abdomen is flat. There is no distension.      Palpations: Abdomen is soft. There is no mass.   Genitourinary:     Comments: Brief is covered in feces  Skin:     General: Skin is warm.      Capillary Refill: Capillary refill takes less than 2  seconds.   Neurological:      Comments: Withdrawing to pain, periodically moving all extremities         DIAGNOSTIC STUDIES / PROCEDURES    EKG  See below    LABS  Results for orders placed or performed during the hospital encounter of 10/29/22   Lactic acid (lactate)   Result Value Ref Range    Lactic Acid 1.9 0.5 - 2.0 mmol/L   CBC With Differential   Result Value Ref Range    WBC 14.0 (H) 4.8 - 10.8 K/uL    RBC 4.06 (L) 4.20 - 5.40 M/uL    Hemoglobin 11.9 (L) 12.0 - 16.0 g/dL    Hematocrit 35.7 (L) 37.0 - 47.0 %    MCV 87.9 81.4 - 97.8 fL    MCH 29.3 27.0 - 33.0 pg    MCHC 33.3 (L) 33.6 - 35.0 g/dL    RDW 44.0 35.9 - 50.0 fL    Platelet Count 231 164 - 446 K/uL    MPV 8.5 (L) 9.0 - 12.9 fL    Neutrophils-Polys 85.70 (H) 44.00 - 72.00 %    Lymphocytes 7.20 (L) 22.00 - 41.00 %    Monocytes 6.10 0.00 - 13.40 %    Eosinophils 0.00 0.00 - 6.90 %    Basophils 0.40 0.00 - 1.80 %    Immature Granulocytes 0.60 0.00 - 0.90 %    Nucleated RBC 0.00 /100 WBC    Neutrophils (Absolute) 11.98 (H) 2.00 - 7.15 K/uL    Lymphs (Absolute) 1.00 1.00 - 4.80 K/uL    Monos (Absolute) 0.85 0.00 - 0.85 K/uL    Eos (Absolute) 0.00 0.00 - 0.51 K/uL    Baso (Absolute) 0.05 0.00 - 0.12 K/uL    Immature Granulocytes (abs) 0.09 0.00 - 0.11 K/uL    NRBC (Absolute) 0.00 K/uL   Comp Metabolic Panel   Result Value Ref Range    Sodium 137 135 - 145 mmol/L    Potassium 3.6 3.6 - 5.5 mmol/L    Chloride 97 96 - 112 mmol/L    Co2 25 20 - 33 mmol/L    Anion Gap 15.0 7.0 - 16.0    Glucose 246 (H) 65 - 99 mg/dL    Bun 11 8 - 22 mg/dL    Creatinine 0.65 0.50 - 1.40 mg/dL    Calcium 8.9 8.5 - 10.5 mg/dL    AST(SGOT) 103 (H) 12 - 45 U/L    ALT(SGPT) 43 2 - 50 U/L    Alkaline Phosphatase 90 30 - 99 U/L    Total Bilirubin 0.4 0.1 - 1.5 mg/dL    Albumin 4.3 3.2 - 4.9 g/dL    Total Protein 6.6 6.0 - 8.2 g/dL    Globulin 2.3 1.9 - 3.5 g/dL    A-G Ratio 1.9 g/dL   Urinalysis    Specimen: Urine   Result Value Ref Range    Color Yellow     Character Clear     Specific  Gravity 1.011 <1.035    Ph 7.5 5.0 - 8.0    Glucose 250 (A) Negative mg/dL    Ketones Trace (A) Negative mg/dL    Protein 100 (A) Negative mg/dL    Bilirubin Negative Negative    Urobilinogen, Urine 0.2 Negative    Nitrite Negative Negative    Leukocyte Esterase Negative Negative    Occult Blood Small (A) Negative    Micro Urine Req Microscopic    DIAGNOSTIC ALCOHOL   Result Value Ref Range    Diagnostic Alcohol <10.1 <10.1 mg/dL   VENOUS BLOOD GAS   Result Value Ref Range    Venous Bg Ph 7.43 7.31 - 7.45    Venous Bg Ph Temp Corrected 7.45 7.31 - 7.45    Venous Bg Pco2 32.8 (L) 41.0 - 51.0 mmHg    Venous Bg Pco2 Temp Corrected 31.7 (L) 41.0 - 51.0 mmHg    Venous Bg Po2 175.9 (H) 25.0 - 40.0 mmHg    Venous Bg Po2 Temp Corrected 171.6 (H) 25.0 - 40.0 mmHg    Venous Bg O2 Saturation 98.2 %    Venous Bg Hco3 22 (L) 24 - 28 mmol/L    Venous Bg Base Excess -2 mmol/L    Body Temp 36.2 Centigrade   ESTIMATED GFR   Result Value Ref Range    GFR (CKD-EPI) 99 >60 mL/min/1.73 m 2   URINE MICROSCOPIC (W/UA)   Result Value Ref Range    WBC 2-5 /hpf    RBC 2-5 (A) /hpf    Bacteria Moderate (A) None /hpf    Epithelial Cells Negative /hpf    Hyaline Cast 0-2 /lpf   EKG   Result Value Ref Range    Report       [facility]    Test Date:  2022-10-29  Pt Name:    VICK LI             Department: ER  MRN:        [id number]                      Room:        01  Gender:     Female                       Technician: [id number]  :        1960                   Requested By:ROBERT BRYANT  Order #:    [id number]                    Reading MD:    Measurements  Intervals                                Axis  Rate:       68                           P:          46  KS:         115                          QRS:        41  QRSD:       90                           T:          66  QT:         432  QTc:        460    Interpretive Statements  Sinus rhythm  Borderline short KS interval  Left ventricular  hypertrophy  Baseline wander in lead(s) I,II,aVR,aVF,V1,V2,V3  Compared to ECG 10/22/2022 21:59:27  Left ventricular hypertrophy now present       *Note: Due to a large number of results and/or encounters for the requested time period, some results have not been displayed. A complete set of results can be found in Results Review.         RADIOLOGY  CT-CSPINE WITHOUT PLUS RECONS   Final Result      1.  Negative for cervical spine fracture      2.  Degenerative subluxation at C3-4      3.  Moderate, multilevel degenerative change      4.  Bilateral carotid atherosclerotic plaque      5.  endotracheal tube present, tip not visualized      DX-CHEST-PORTABLE (1 VIEW)   Final Result      Endotracheal tube is been placed and appears appropriately located      CT-HEAD W/O   Final Result         1.  3.0 cm right holohemispheric subdural hematoma resulting in mass effect and 2.3 cm right to left midline shift   2.  Right ankle herniation and herniation of the anterior right temporal horn.   3.  Atherosclerosis.      These findings were discussed with the patient's clinician, Nirmal Cline, on 10/29/2022 7:57 AM.      US-ABORTED US PROCEDURE    (Results Pending)           Intubation    Date/Time: 10/29/2022 9:00 AM  Performed by: Nirmal Cline M.D.  Authorized by: Nirmal Cline M.D.     Consent:     Consent obtained:  Emergent situation  Pre-procedure details:     Indication: predicted clinical deterioration      Patient status:  Altered mental status    Pharmacologic strategy: RSI      Induction agents:  Propofol (fentanyl)    Paralytics:  Rocuronium  Procedure details:     Preoxygenation:  Bag valve mask    CPR in progress: no      Intubation method:  Oral    Intubation technique: video assisted      Bougie used: no      Tube size (mm):  7.0    Tube type:  Cuffed    Number of attempts:  1    Tube visualized through cords: yes    Placement assessment:     Tube secured with:  Adhesive tape and ETT beard    Breath  sounds:  Equal and absent over the epigastrium    Placement verification: chest rise and CXR verification      CXR findings:  Appropriate position  Post-procedure details:     Procedure completion:  Tolerated      45 minutes of critical care spent with patient, this does not include the time spent on the procedure above    COURSE & MEDICAL DECISION MAKING  Pertinent Labs & Imaging studies reviewed. (See chart for details)    Patient here with altered mental status of unclear etiology.  She is withdrawing to pain.  She has equal pupils bilaterally.  Questionable history of trauma.  Patient is a very wide differential for altered mental status given her history of cirrhosis, overdose, chronic hypoxia, and subdural hematoma.  Patient certainly could have traumatic ICH, will check ammonia to evaluate for possible hepatic encephalopathy, will check VBG to evaluate for possible severe hypercapnia, will check alcohol level, will also check basic labs for further restratification, patient afebrile, sepsis less likely but certainly remains in the differential.  Patient does have questionable cephalhematoma and is altered therefore immediately after seeing patient recalls a traumatic brain injury protocol helping to facilitate fastidious CT.  I went to the scanner with patient, and it was obvious on my read there that patient had a very large acute subdural hematoma with associated shift and questionable herniation. Trauma Red was activated given these findings and trauma surgery attending Dr. Mcallister Was at bedside immediately. Teg was ordered.  Hypertonic saline, nicardipine drip were ordered.  Furthermore given these findings and patient's altered mental status she was planned to be electively intubated.  prior to intubation it was identified the patient appeared to be developing pupillary asymmetry with right greater than left.    Patient was intubated and hyperventilated following this.  We ensured that the bed was  greater than 30 degrees.  Patient was transferred to the ICU.  After this I discussed the case with surgery on-call Dr. Ibarra.  Dr. Ibarra has reviewed the case and imaging, he believes that the injury is likely catastrophic and that meaningful neurologic outcome is highly unlikely despite surgical management.  I discussed these findings, and my discussion with neurosurgery with patient's .  He would like to make patient comfort measures only, DNR/DNI.  We will help facilitate transfer of patient's  to our facility so he can be with patient.  Patient admitted to the ICU.      FINAL IMPRESSION  1.  Altered mental status, traumatic subdural hematoma, midline shift      Electronically signed by: Nirmal Cline M.D., 10/29/2022 7:17 AM

## 2022-10-29 NOTE — ASSESSMENT & PLAN NOTE
3.0 cm right holohemispheric subdural hematoma resulting in mass effect and 2.3 cm right to left midline shift.  Right uncal herniation and herniation of the anterior right temporal horn.  Non-operative management. Non survivable injury  Post traumatic pharmacologic seizure prophylaxis for 1 week.  Shankar Ibarra MD, PhD. Neurosurgeon. [facility].   Family made comfort care

## 2022-10-29 NOTE — DISCHARGE PLANNING
RNCM received notification from another [facility]  to call chaplain Yoko Nixon [phone] called who stated he's in route.

## 2022-10-29 NOTE — PROGRESS NOTES
HR- 76  BP- 175/77  SpO2- 100% APV CMV 24/420/8/50%  Temp: 98.6f  Weight: 53.4 kg        Pt. Belongings:    Blue and black blanket        4 Eyes Skin Assessment Completed by Spring RN and PENNY Francis.    Head Posterior head laceration and hematoma  Ears WDL  Nose WDL  Mouth WDL  Neck WDL, collar in place  Breast/Chest: old scars  Shoulder Blades WDL  (R) Arm/Elbow/Hand elbow bruising, slow to yonas  (L) Arm/Elbow/Hand Bruising, elbow bruising, slow to yonas  Abdomen: Old scars on abdomen  Groin WDL  Scrotum/Coccyx/Buttocks: discolored, red, smooth, healing hip incision. Low to yonas redness on L back, bruising on R back  (R) Leg Brusing on knee  (L) Leg Bruising on knee  (R) Heel/Foot/Toe Boggy, slow to yonas  (L) Heel/Foot/Toe Boggy, slow to yonas        Devices In Places ECG, Blood Pressure Cuff, Pulse Ox, Holbrook, and ET Tube      Interventions In Place Sacral Mepilex, Q2 Turns, and Low Air Loss Mattress    Possible Skin Injury Yes    Pictures Uploaded Into Epic Yes  Wound Consult Placed N/A  RN Wound Prevention Protocol Ordered Yes

## 2022-10-29 NOTE — PROGRESS NOTES
Spiritual Care Note      Patient's Name: Roxana Cuba   MRN: [id number]    YOB: 1960   Age and Gender: 62 y.o. female   Unit/Service Area: SICU   Room (and Bed): [facility]   Ethnicity or Nationality:     Primary Language: English   Judaism/Spiritual Preference: Sabianism   Place of Residence: [place], [place]   Family/Friends/Others Present:   daughter  son   Medical Diagnoses/Procedures: trauma, found down  traumatic subdural hematoma  cirrhosis, h/o liver transplant Dec 2011   Code Status: Comfort Care/DNR    Date of Admission: 10/29/2022   Length of Stay: 0 days        Spiritual Screen   Was spiritual care education provided to the patient?   Yes       Nature of the Visit:   Initial, Call back    Crisis Visit:   Patient actively dying/EOL    Referral from/Origin of Request:   Verbal, from Staff, by Phone  +  Epic nursing order    Visited with:   Patient and family together    Observations/Symptoms:   Patient laying in bed, obtunded.  Family bedside,tearful.    Interaction/Conversation:   Family requested final prayer.    Assessment:   Need, Distress    Need:   Seeking Spiritual Assistance and Support    Distress:   Grief/Loss/Bereavement    Judaism & Ritual Needs:   EOL Ritual -- Final Prayer of Commendation & [place]    Intended Effects:   Demonstrate Caring and Concern, Journeying with Someone in Grief Process, Tasha Affirmation    Interventions:   Compassionate Presence, Emotional Support, Prayer, [place].    Outcomes:   Emotional Release, Spiritual Comfort, Progress with Grief    Total Time:   10 minutes      Spiritual Care Provider   camille Benton  [phone]   [email or web address]

## 2022-10-29 NOTE — DISCHARGE PLANNING
ADDENDUM  0833  SW was informed by the medical provider that the patient is on comfort care and that the spouse may need a ride to the hospital. SW contacted the spouse to assist him with a ride to the hospital. He reported that he does not need a ride, he will contact his dtr. SW informed medical provider.    Trauma Response    Referral: Trauma Red Response    Intervention: SW responded to trauma Red. Pt was BIB [facility] after having a GLF the previous night. Pt was not alert or talkative upon arrival. Pts name is Roxana Cuba (: 1960). SW obtained the following pt information: Patient resides with her spouse. SW was able to contact pts spouse. Otis Cuba ([phone]) to inform him of the patient's arrival to the ER. He was also given the room number and told that she was being intubated.    Plan: SW will assist as necessary

## 2022-10-29 NOTE — H&P
"    TRAUMA HISTORY AND PHYSICAL    DATE OF SERVICE: 10/29/2022    ACTIVATION LEVEL: Red.     HISTORY OF PRESENT ILLNESS: The patient is a 62 year-old woman who was injured from an apparent ground level fall, although her  does not believe she hit her head.  Apparently later in the evening, they were arguing and she wanted a drink, which she had and then went to bed.  When he went to wake her up several hours later she would not wake up.  He called an ambulance at that time.  She arrived in the ER as an \"altered mental status\" patient and was upgraded to a trauma red after her head CT.  The CT shows large subdural hematoma with significant shift. On my arrival patient only moves slightly to pain and it is unclear if this is withdrawal or posturing.  Right pupil is more dilated than left, both are reactive. She was intubated in the emergency dept and had a cervical collar placed.     The patient was triaged to [facility] in accordance with established pre hospital protocols. An expeditious primary and secondary survey with required adjuncts was conducted. See Trauma Narrator for full details.    PAST MEDICAL HISTORY:   Past Medical History:   Diagnosis Date    Anemia     Cardiomegaly     Cataract     bilateral IOL    Chronic obstructive pulmonary disease (HCC)     Cirrhosis (HCC) 12/2011    Status post liver transplant at [facility]    CKD (chronic kidney disease) stage 3, GFR 30-59 ml/min (HCC)     Diabetes (HCC)     reports hx of, resolved w/weight loss. 6/10/22 pt reports recently told she has diabetes    Emphysema of lung (HCC)     Fracture of left foot     GERD (gastroesophageal reflux disease)          H/O Clostridium difficile infection 02/17/2017    reports \"16 months ago\". Current stool sample 01-26-17 neg    Heart burn     Hemorrhagic disorder (HCC)     \"low platelets\" bruises easily     Hiatus hernia syndrome     High cholesterol     Hypertension     Hypothyroid     Indigestion     " Jaundice 2009    Liver transplanted ([facility])     Low back pain 02/17/2017    and left foot, 7/10    Memory difficulty     short and long term since head injury 8/2021    Mild aortic regurgitation and aortic valve sclerosis      On home oxygen therapy     5 liters, Dr. Gaming    Platelet disorder ([facility])     low platelets    Pneumonia     aspiration,     Psychiatric disorder     Mood disorder    Pulmonary hypertension ([facility])         S/P cholecystectomy     Seizure ([facility]) 05/29/2022    since head injury8/ 2021    Shoulder pain     right    Small bowel obstruction ([facility])     01-    Splenomegaly     VRE (vancomycin-resistant Enterococci)     02-17-17, pt declines knowledge of this      Extensive medical history as above.      PAST SURGICAL HISTORY:   Past Surgical History:   Procedure Laterality Date    PB PARTIAL HIP REPLACEMENT Right 8/28/2022    Procedure: HEMIARTHROPLASTY, HIP;  Surgeon: Anupam Ferrer M.D.;  Location: [facility];  Service: Orthopedics    OK COLONOSCOPY,DIAGNOSTIC N/A 6/21/2022    Procedure: COLONOSCOPY;  Surgeon: Neelam Castellanos D.O.;  Location: SURGERY [facility];  Service: Gastroenterology    OK UPPER GI ENDOSCOPY,DIAGNOSIS N/A 6/21/2022    Procedure: GASTROSCOPY;  Surgeon: Neelam Castellanos D.O.;  Location: SURGERY [facility];  Service: Gastroenterology    OK UPPER GI ENDOSCOPY,DIAGNOSIS N/A 02/03/2022    Procedure: GASTROSCOPY;  Surgeon: Aravind Richards M.D.;  Location: SURGERY SAME DAY [facility];  Service: Gastroenterology    CRANIOTOMY Left 08/04/2021    Procedure: CRANIOTOMY;  Surgeon: Tramaine Arnold III, M.D.;  Location: [facility];  Service: Neurosurgery    VENTRAL HERNIA REPAIR ROBOTIC XI N/A 11/12/2018    Procedure: VENTRAL HERNIA REPAIR ROBOTIC XI- FOR EPIGASTRIC INCISIONAL;  Surgeon: John H Ganser, M.D.;  Location: [facility];  Service: General    TURBINATE REDUCTION Bilateral 10/04/2018    Procedure: TURBINATE REDUCTION;  Surgeon: Silviano  CLINT Erazo;  Location: SURGERY SAME DAY [facility];  Service: Ent    NASAL RECONSTRUCTION Bilateral 10/04/2018    Procedure: NASAL RECONSTRUCTION- LATERA IMPLANTS;  Surgeon: Silviano Erazo M.D.;  Location: SURGERY SAME DAY [facility];  Service: Ent    GASTRIC BYPASS LAPAROSCOPIC  2018    PANNICULECTOMY  12/11/2017    paniculectomy    COLONOSCOPY N/A 03/27/2017    Procedure: COLONOSCOPY;  Surgeon: Osman aMtt M.D.;  Location: SURGERY SAME DAY [facility];  Service:     GASTROSCOPY N/A 03/27/2017    Procedure: GASTROSCOPY;  Surgeon: Osman Matt M.D.;  Location: SURGERY SAME DAY [facility];  Service:     BREAST BIOPSY Left 02/21/2017    Procedure: BREAST BIOPSY - WIRE LOCALIZED EXCISONAL ;  Surgeon: Jane Zhao M.D.;  Location: SURGERY SAME DAY [facility];  Service:     LUNG BIOPSY OPEN  11/2016    OTHER ABDOMINAL SURGERY  12/2015    Gastric Sleeve    BONE MARROW ASPIRATION  03/16/2015    Performed by Freddie Hi M.D. at ENDOSCOPY [facility]    BONE MARROW BIOPSY, NDL/TROCAR  03/16/2015    Performed by Freddie Hi M.D. at ENDOSCOPY [facility]    RECOVERY  03/31/2014    Performed by Ir-Recovery Surgery at SURGERY [facility]    RECOVERY  03/24/2014    Performed by Cath-Recovery Surgery at SURGERY SAME DAY [facility]    RECOVERY  01/21/2014    Performed by Ir-Recovery Surgery at SURGERY SAME DAY [facility] ORS    BRONCHOSCOPY-ENDO  11/15/2013    Performed by Ha Perez M.D. at ENDOSCOPY [facility]    RECOVERY  02/27/2013    Performed by Ir[facility]Recovery Surgery at SURGERY SAME DAY [facility]    BONE MARROW ASPIRATION  12/31/2012    Performed by Josemanuel Real M.D. at ENDOSCOPY [facility]    BONE MARROW BIOPSY, NDL/TROCAR  12/31/2012    Performed by Josemanuel Real M.D. at ENDOSCOPY [facility]    GASTROSCOPY  09/28/2012    Performed by Aravind Richards M.D. at SURGERY [facility]    SIGMOIDOSCOPY FLEX  09/26/2012    Performed by Kristopher Cardoso M.D. at  ENDOSCOPY [facility]S    BRONCHOSCOPY-ENDO  06/19/2012    Performed by MARLENA MURILLO at ENDOSCOPY [facility]S    BRONCHOSCOPY-ENDO  05/29/2012    Performed by SUYAPA CAMARENA at ENDOSCOPY [facility] ORS    OTHER ABDOMINAL SURGERY  12/2011    Liver transplant at [facility] by Dr. Canada.    GASTROSCOPY-ENDO  03/12/2010    Performed by CAMELIA SAMANO at ENDOSCOPY [facility]S    EXAM UNDER ANESTHESIA  11/11/2009    Performed by BIRD ABDI at SURGERY [facility] ORS    HEMORRHOIDECTOMY  11/11/2009    Performed by BIRD ABDI at SURGERY [facility] ORS    KELBY BY LAPAROSCOPY  09/19/2009    Performed by BIRD ABDI at SURGERY [facility] ORS    CARPAL TUNNEL RELEASE           KELBY BY LAPAROSCOPY      HERNIA REPAIR      x3    HYSTERECTOMY, TOTAL ABDOMINAL           MAMMOPLASTY REDUCTION      OTHER SURGICAL PROCEDURE      liver transplant    MD ANESTH,NOSE,SINUS SURGERY      x4    TONSILLECTOMY        .     ALLERGIES: Nsaids, Rhubarb, Other drug, Tylenol, and Vancomycin         CURRENT MEDICATIONS:   No outpatient medications have been marked as taking for the 10/29/22 encounter (Hospital Encounter).   Naloxone, ambrisentan, clonazepam, lacosamide, levetiracetam, metoclopramide, morphine, mycophenolate, oxycodone, potassium, pravastatin, tacrolimus, tadalafil    FAMILY HISTORY: family history includes Alcohol/Drug in her maternal grandfather, maternal grandmother, and mother; Cancer in her paternal aunt; Diabetes in her father; Heart Disease in her father; Hyperlipidemia in her father and mother; Hypertension in her father; Non-contributory in her mother; Other in her father; Stroke in her father.  Reviewed and found to be non-contributory in regards to the above presentation    SOCIAL HISTORY:  reports that she has never smoked. She has never used smokeless tobacco. She reports that she does not currently use alcohol. She reports that she does not use drugs.  Unable to obtain due to  "patient condition    REVIEW OF SYSTEMS:   Comprehensive review of systems is not able to be elicited from the patient secondary to the acuity of the clinical situation.    PHYSICAL EXAMINATION:   VITAL SIGNS:   BP (!) 140/73   Pulse 84   Temp 36.2 °C (97.2 °F) (Tympanic)   Resp 19   Ht 1.75 m (5' 8.9\")   Wt 53.9 kg (118 lb 13.3 oz)   SpO2 100%     GENERAL:   The patient appears older than stated age and in distress    HEENT:  HEAD: Atraumatic, normocephalic. No overt bruising   EARS: The ear canals and tympanic membranes are normal.Normal pinna bilaterally.    EYES:   Right pupil is 4mm, left is 2mm and both are reactive . The extraocular muscles are intact bilaterally..    NOSE: No rhinorrhea.  The bilateral nares are clear.  THROAT: Oral mucosa is moist.  The oropharynx are clear.    FACE:   The midface and jaw are stable. No malocclusion is evident.    NECK:    The cervical spine was examined utilizing spinal motion restriction.   The cervical spine is immobilized with a hard collar..    CHEST:    Inspection: Labored respirations and accessory muscle use.  Palpation:  The chest is nontender. The clavicles are non deformed bilaterally..  Auscultation: normal.    CARDIOVASCULAR:    Auscultation: normal and regular rate and rhythm.  Peripheral Pulses: Normal.      ABDOMEN:    Abdomen is soft, nontender, without organomegaly or masses.    BACK/PELVIS:    The thoracolumbar spine was examined utilizing spinal motion restriction.   Inspection and palpation reveal no significant tenderness, swelling, or deformity in the thoracolumbar region.  The pelvis is stable.    RECTAL:  Deferred    GENITOURINARY:  The patient has normal external reproductive anatomy.    EXTREMITIES:  RIGHT ARM: Without deformities, wounds, lacerations, or excoriations.  Full passive and active range of motion without pain.  LEFT ARM: Without deformities, wounds, lacerations, or excoriations.  Full passive and active range of motion without " pain.  RIGHT LEG: Without deformities, wounds, lacerations, or excoriations.  Full passive and active range of motion without pain.  LEFT LEG: Without deformities, wounds, lacerations, or excoriations.  Full passive and active range of motion without pain.    NEUROLOGIC:    [place] Coma Scale (GCS) 5, then 3T post intubation.   Neurologic examination revealed no verbal response or eye opening, postures.    SKIN:  The skin is cool.    PSYCHIATRIC:   Cannot be assessed    LABORATORY VALUES:   Recent Labs     10/29/22  0720   WBC 14.0*   RBC 4.06*   HEMOGLOBIN 11.9*   HEMATOCRIT 35.7*   MCV 87.9   MCH 29.3   MCHC 33.3*   RDW 44.0   PLATELETCT 231   MPV 8.5*     Recent Labs     10/29/22  0720   SODIUM 137   POTASSIUM 3.6   CHLORIDE 97   CO2 25   GLUCOSE 246*   BUN 11   CREATININE 0.65   CALCIUM 8.9     Recent Labs     10/29/22  0720   ASTSGOT 103*   ALTSGPT 43   TBILIRUBIN 0.4   ALKPHOSPHAT 90   GLOBULIN 2.3            IMAGING:   CT-CSPINE WITHOUT PLUS RECONS   Final Result      1.  Negative for cervical spine fracture      2.  Degenerative subluxation at C3-4      3.  Moderate, multilevel degenerative change      4.  Bilateral carotid atherosclerotic plaque      5.  endotracheal tube present, tip not visualized      DX-CHEST-PORTABLE (1 VIEW)   Final Result      Endotracheal tube is been placed and appears appropriately located      CT-HEAD W/O   Final Result         1.  3.0 cm right holohemispheric subdural hematoma resulting in mass effect and 2.3 cm right to left midline shift   2.  Right ankle herniation and herniation of the anterior right temporal horn.   3.  Atherosclerosis.      These findings were discussed with the patient's clinician, Nirmal Cline, on 10/29/2022 7:57 AM.      US-ABORTED US PROCEDURE    (Results Pending)       IMPRESSION AND PLAN:  * Trauma- (present on admission)  Assessment & Plan  Found down  Trauma Red Activation. Upgrade from TBI.  Brooke Mcallister MD. Trauma Surgery.    Traumatic  subdural hematoma, initial encounter- (present on admission)  Assessment & Plan  3.0 cm right holohemispheric subdural hematoma resulting in mass effect and 2.3 cm right to left midline shift.  Right uncal herniation and herniation of the anterior right temporal horn.  Non-operative management. Non survivable injury  Post traumatic pharmacologic seizure prophylaxis for 1 week.  Shankar Ibarra MD, PhD. Neurosurgeon. [facility].      Contraindication to deep vein thrombosis (DVT) prophylaxis- (present on admission)  Assessment & Plan  Prophylactic anticoagulation for thrombotic prevention initially contraindicated secondary to elevated bleeding risk.    Cirrhosis (HCC)- (present on admission)  Assessment & Plan  History of liver transplant December 2011  TEG with platelet mapping pending    DNR (do not resuscitate) discussion- (present on admission)  Assessment & Plan  Neurosurgery believes no chance for meaningful recovery.  Discussion with  by Dr Cline, patient is made DNR.    Plan to transition to comfort care, but he is hopeful he can make it to the hospital.        Aggregated critical care time spent evaluating, reviewing documentation, providing care, and managing this patient exclusive of procedures: 45 minutes  ____________________________________   Brooke Mcallister M.D.     NEO / [facility]     DD: 10/29/2022   DT: 8:07 AM

## 2022-10-29 NOTE — ASSESSMENT & PLAN NOTE
Neurosurgery believes no chance for meaningful recovery.  Discussion with  by Dr Cline, patient is made DNR.    Plan to transition to comfort care, but he is hopeful he can make it to the hospital.

## 2022-10-29 NOTE — CONSULTS
[facility] Neurosurgery  Called [phone] returned call images reviewed discussed with  evaluated 820    Author: Shankar Ibarra M.D. Date & Time created: 10/29/2022  10:37 AM     HPI:  62 year old female who fell last night, she was unresponsive this am.  She was brought to ER; head CT demonstrates very large right ASDH with significant mass effect.      ROS per h/p    Physical Exam  Pupils 5 mm midline non reactive  Weak extension bilateral upper arms to deep stimulation    Patient Active Problem List    Diagnosis Date Noted    Subdural hematoma, post-traumatic 08/27/2022    Traumatic subdural hematoma, initial encounter 08/03/2021    Adrenal insufficiency (HCC) 03/05/2019    Mild aortic regurgitation and aortic valve sclerosis 03/17/2014    Dysphagia 09/29/2022    Alcohol use 09/28/2022    History of seizures 09/28/2022    Contraindication to deep vein thrombosis (DVT) prophylaxis 08/27/2022    Frequent falls 08/27/2022    Syncope 03/19/2022    GERD (gastroesophageal reflux disease) 01/04/2017    Chronic, continuous use of opioids 06/15/2016    Immunocompromised state (HCC) 11/14/2015    Other hyperlipidemia 12/09/2014    Chronic respiratory failure (HCC) 11/13/2014    Vitamin D deficiency 08/26/2014    Ostium secundum type atrial septal defect, S/P percutaneous closure 03/17/2014    Hepatopulmonary syndrome (HCC) 03/05/2014    Interstitial lung disease (HCC) 11/14/2013    MGUS (monoclonal gammopathy of unknown significance) 11/14/2013    History of liver transplant (HCC) 11/14/2013    History of pancreatitis 06/20/2012    Cirrhosis (HCC) 03/13/2012    Primary pulmonary hypertension (HCC) 01/13/2012    Scalp laceration, initial encounter 09/28/2022    Pulmonary nodule 09/28/2022    History of craniotomy 09/28/2022    Heart burn     Trauma 08/27/2022    Pulmonary hypertension (HCC) 02/03/2015    Sarcoidosis (HCC) 11/14/2013    DUC (generalized anxiety disorder) 11/04/2013    Primary insomnia 11/04/2013    Status  post liver transplant Dr. Canada ([facility]) 03/13/2012    Hypothyroidism 03/13/2012    DNR (do not resuscitate) discussion 10/29/2022    Acute encephalopathy 10/23/2022    Suicide attempt by drug overdose (HCC) 10/23/2022    Major neurocognitive disorder as late effect of traumatic brain injury without behavioral disturbance ([facility]) 09/26/2022    Seizure disorder (HCC) 08/29/2022    Diarrhea and abdominal pain 05/29/2022    Acute metabolic encephalopathy 05/29/2022    Anemia requiring a blood transfusion 03/19/2022    Lactic acidosis 03/19/2022    Closed fracture of proximal end of humerus 03/19/2022    Chronic migraine without aura, intractable, without status migrainosus 03/09/2022    Enteritis 01/23/2022    Moderate protein-calorie malnutrition (HCC) 01/13/2022    Cellulitis of right lower extremity 12/26/2021    Volume overload 12/25/2021    Dizziness 11/08/2021    Debility 09/12/2021    Hypomagnesemia 09/11/2021    Hypocalcemia 09/11/2021    Campylobacter gastroenteritis 09/11/2021    Closed fracture of right elbow with routine healing 08/23/2021    Seizures ([facility]) 08/10/2021    Hypernatremia 08/03/2021    History of pneumonia 08/03/2021    Cold sore 04/04/2019    AP (abdominal pain) 03/05/2019    Pancytopenia (HCC) 03/05/2019    Mixed obsessional thoughts and acts 01/30/2019    Hyperglycemia 01/30/2019    Incisional hernia, without obstruction or gangrene 01/30/2019    History of infection with vancomycin resistant Enterococcus (VRE) 01/30/2019    High risk medication use 08/28/2018    History of aspiration pneumonia 02/06/2018    Hiatal hernia 02/06/2018    Thrombocytopenia ([facility]) 01/21/2018    Slow transit constipation 11/03/2017    S/P bariatric surgery 10/31/2017    Steroid-induced hyperglycemia 10/03/2017    Recurrent major depressive disorder, in remission (HCC) 07/14/2017    History of small bowel obstruction 07/14/2017    History of Clostridium difficile infection 07/14/2017    Chronic prescription  benzodiazepine use 2017    Hypokalemia 2017    Other allergic rhinitis 2016    Splenic lesion 2015    Mild mitral regurgitation 2015    Splenomegaly 2015    H/O non-ST elevation myocardial infarction (NSTEMI) 2013         Temp (24hrs), Av.2 °C (97.2 °F), Min:36.2 °C (97.2 °F), Max:36.2 °C (97.2 °F)    Pulse: (!) 103, Respiration: (!) 24, Blood Pressure: (!) 155/73, Weight: 53.9 kg (118 lb 13.3 oz), Pulse Oximetry: 100 %, O2 (LPM): 4 (99% o2 applied during triage)     Date 10/29/22 07 - 10/30/22 0659   Shift 9474-2183 2416-8557 9786-4252 24 Hour Total   INTAKE   I.V. 0   0     Pre-Hospital Volume 0   0     Trauma Resuscitation Volume 0   0   Blood 0   0     PRBC Total Volume (Non-Barcoded) 0   0     FFP Total Volume (Non-Barcoded) 0   0     Platelets Total Volume (Non-Barcoded) 0   0     Cryoprecipitate (Pooled) Total Volume (Non-Barcoded) 0   0   Shift Total 0   0   OUTPUT   Other 0   0     Pre-Hospital Output 0   0     Trauma Resuscitation Output 0   0   Blood 0   0     Est. Blood Loss 0   0   Shift Total 0   0   NET 0   0         Intake/Output Summary (Last 24 hours) at 10/29/2022 1037  Last data filed at 10/29/2022 0755  Gross per 24 hour   Intake 0 ml   Output 0 ml   Net 0 ml       Urethral Catheter Temperature probe 16 Fr. (Active)   Site Assessment Clean;Skin intact 10/29/22 08   Securement Method Securing device (Describe) 10/29/22 08        ondansetron  4 mg      Respiratory Therapy Consult        MD ALERT...adult comfort care        morphine injection  5 mg      morphine injection  10 mg      LORazepam  1 mg         Recent Labs     10/29/22  0720   WBC 14.0*   RBC 4.06*   HEMOGLOBIN 11.9*   HEMATOCRIT 35.7*   MCV 87.9   MCH 29.3   PLATELETCT 231     Recent Labs     10/29/22  0720   SODIUM 137   POTASSIUM 3.6   CHLORIDE 97   CO2 25   GLUCOSE 246*   BUN 11   CREATININE 0.65   CALCIUM 8.9       HISTORY/REASON FOR EXAM: Mental status change, unknown cause      TECHNIQUE/EXAM DESCRIPTION:  CT of the head without contrast.     Sequential axial images were obtained from the vertex to the skull base without contrast.     Up to date radiation dose reduction adjustments have been utilized to meet ALARA standards for radiation dose reduction.     COMPARISON: October 23, 2022     FINDINGS:     There is 3.0 cm right holohemispheric subdural hematoma resulting in marked mass effect with effacement of the ventricles, right greater than left, and 2.3 cm right to left midline shift. There is uncal herniation seen with herniation of the temporal   horn of the right ventricle.     The visualized paranasal sinuses and mastoid air cells are well aerated bilaterally. No depressed calvarial fractures are identified. The visualized globes and retrobulbar soft tissues appear within normal limits.  Atherosclerotic intracranial   calcifications are seen.     IMPRESSION:        1.  3.0 cm right holohemispheric subdural hematoma resulting in mass effect and 2.3 cm right to left midline shift  2.  Right ankle herniation and herniation of the anterior right temporal horn.  3.  Atherosclerosis.    AP:  62 year old female with 3.5 cm (personal review) right holohemispheric ASDH with 2.3 cm midline shift, GCS 4.  Discussion was undertaken with ; while the SDH can be evacuated, given the extent of brain compression, size of hematoma, and clinical examination, recovery is unlikely.  Anticipated outcome would be tracheostomy, PEG, SNF with persistent vegetative state.  After discussion, patients  wished to pursue comfort care in keeping with her wishes.    Neurosurgery will sign off

## 2022-10-29 NOTE — DISCHARGE PLANNING
HTH/SCP chart review completed. Noted patient is a readmission in the setting of SDH (please see general surgery, Dr. Karlie M.D. note 10/29/2022 at 9:55AM) noting patient has transitioned to comfort care measures. As such, TCN will not follow. Thank you.

## 2022-10-29 NOTE — PROGRESS NOTES
Family at bedside, have decided to transition to comfort care, all in agreement. Orders confirmed.   Pt premedicated for nausea, anxiety, and pain.  Extubated 1018 by RT.   Chaplain Nixon in route for prayer, family does not wish to wait for his arrival to extubate.

## 2022-10-30 NOTE — PROGRESS NOTES
Pt discussed during CC rounds.  Home medication amounts provided by family discussed.   Orders adjusted to ensure patient comfort based on home regimen.

## 2022-10-30 NOTE — CARE PLAN
The patient is Watcher - Medium risk of patient condition declining or worsening    Shift Goals  Clinical Goals: comfort care  Patient Goals: ADAM  Family Goals: comfort      Problem: Pain - Standard  Goal: Alleviation of pain or a reduction in pain to the patient’s comfort goal  10/30/2022 0101 by Sidney Del Rio R.N.  Outcome: Progressing  10/30/2022 0100 by Sidney Del Rio R.N.  Outcome: Progressing     Problem: Knowledge Deficit - Comfort Care  Goal: Patient and family/care givers will demonstrate understanding of dying process and grieving  10/30/2022 0101 by Sidney Del Rio R.N.  Outcome: Progressing  10/30/2022 0100 by Sidney Del Rio R.N.  Outcome: Progressing     Problem: Psychosocial - Comfort Care  Goal: Spiritual and cultural needs incorporated into hospitalization  10/30/2022 0101 by Sidney Del Rio R.N.  Outcome: Progressing  10/30/2022 0100 by Sidney Del Rio R.N.  Outcome: Progressing  Goal: Patient's level of anxiety will decrease  10/30/2022 0101 by Sidney Del Rio R.N.  Outcome: Progressing  10/30/2022 0100 by Sidney Del Rio R.N.  Outcome: Progressing  Goal: Patient and family will demonstrate ability to cope with life altering diagnosis and/or procedure  10/30/2022 0101 by Sidney Del Rio R.N.  Outcome: Progressing  10/30/2022 0100 by Sidney Del Rio R.N.  Outcome: Progressing  Goal: Privacy will be maintained for patient and family  10/30/2022 0101 by Sidney Del Rio R.N.  Outcome: Progressing  10/30/2022 0100 by Sidney Del Rio R.N.  Outcome: Progressing

## 2022-10-30 NOTE — PROGRESS NOTES
Trauma / Surgical Daily Progress Note    Date of Service  10/30/2022    Chief Complaint  62 y.o. female admitted 10/29/2022 with fatal SDH    Interval Events  Admitted with fatal SDH. Extubated and on comfort care. Med manipulations made.    Review of Systems  Review of Systems   Unable to perform ROS: Mental status change      Vital Signs for last 24 hours  Pulse:  [108-129] 119  Resp:  [15-48] 17  BP: ()/(50-74) 92/53  SpO2:  [7 %-71 %] 26 %    Hemodynamic parameters for last 24 hours       Respiratory Data     Respiration: 17, Pulse Oximetry: (!) 26 %     Work Of Breathing / Effort: Mild;Shallow       Physical Exam  Physical Exam  Constitutional:       Comments:     Pulmonary:      Comments: Guppy breathing      Laboratory  No results found for this or any previous visit (from the past 24 hour(s)).      Fluids    Intake/Output Summary (Last 24 hours) at 10/30/2022 1040  Last data filed at 10/29/2022 2000  Gross per 24 hour   Intake --   Output 1000 ml   Net -1000 ml         Core Measures & Quality Metrics  Labs reviewed, Medications reviewed and Radiology images reviewed  Holbrook catheter: Critically Ill - Requiring Accurate Measurement of Urinary Output      DVT Prophylaxis: Contraindicated - High bleeding risk  DVT prophylaxis - mechanical: SCDs  Ulcer prophylaxis: Yes    Assessed for rehab: Patient unable to tolerate rehabilitation therapeutic regimen  RAP Score Total: 0   Blood Alcohol>0.08: yes     Assessment/Plan  * Trauma- (present on admission)  Assessment & Plan  Found down  Trauma Red Activation. Upgrade from TBI.  Brooke Mcallister MD. Trauma Surgery.    Traumatic subdural hematoma, initial encounter- (present on admission)  Assessment & Plan  3.0 cm right holohemispheric subdural hematoma resulting in mass effect and 2.3 cm right to left midline shift.  Right uncal herniation and herniation of the anterior right temporal horn.  Non-operative management. Non survivable injury  Post traumatic  pharmacologic seizure prophylaxis for 1 week.  Shankar Ibarra MD, PhD. Neurosurgeon. [facility].   Family made comfort care      Contraindication to deep vein thrombosis (DVT) prophylaxis- (present on admission)  Assessment & Plan  Prophylactic anticoagulation for thrombotic prevention initially contraindicated secondary to elevated bleeding risk.    Cirrhosis (HCC)- (present on admission)  Assessment & Plan  History of liver transplant December 2011  TEG with platelet mapping pending    DNR (do not resuscitate) discussion- (present on admission)  Assessment & Plan  Neurosurgery believes no chance for meaningful recovery.  Discussion with  by Dr Cline, patient is made DNR.    Plan to transition to comfort care, but he is hopeful he can make it to the hospital.          Discussed patient condition with RN Desiree Mcallister and Fred.  CRITICAL CARE TIME EXCLUDING PROCEDURES: 30    minutes

## 2022-10-31 ENCOUNTER — TELEPHONE (OUTPATIENT)
Dept: CARDIOLOGY | Facility: MEDICAL CENTER | Age: 62
End: 2022-10-31

## 2022-10-31 LAB
BACTERIA UR CULT: ABNORMAL
BACTERIA UR CULT: ABNORMAL
SIGNIFICANT IND 70042: ABNORMAL
SITE SITE: ABNORMAL
SOURCE SOURCE: ABNORMAL

## 2022-10-31 NOTE — PROGRESS NOTES
Pt is  case.   All lines to be left in place, and body held in [facility] with  transferred to lab.

## 2022-10-31 NOTE — TELEPHONE ENCOUNTER
RO    Morning,     This patient's  called to let ADRIANE know that she passed away on 10-. He wanted to advise and to cancel any prescription refills.      Thank you,    ANGIE

## 2022-10-31 NOTE — DISCHARGE SUMMARY
DISCHARGE SUMMARY    DATE OF ADMISSION:  10/29/2022   DATE OF DISCHARGE:  10/30/2022     FINAL DIAGNOSES:  1.  Subdural hematoma.  2.  History of cirrhosis with history of liver transplant 10 years ago.     PROCEDURES PERFORMED:  None.     REASON FOR ADMISSION:  The patient is a 62-year-old woman who apparently has   had 2 subdurals over the last year, recent admission approximately 10 days   ago.  She apparently had a ground level fall right before this admission.  She went   to bed and then did not wake up.  Her  called 911.  She was brought in   as a trauma red and was found to have a large subdural hematoma with   significant shift and was posturing.  In the emergency room, she was found to   have anisocoria and was taken for a CT and was found to have a probable fatal   subdural hematoma.     HOSPITAL COURSE:  The patient was evaluated in the emergency room as a trauma   red.  Aforementioned findings were noted.  She was evaluated by Dr. Ibarra   from neurosurgery, who felt she had uncal herniation and that this was a fatal   injury and that she would not have a substantial improvement in her outcome with surgical intervention.    Family was approached and they elected to make her DNR and put her on comfort   care.  She was extubated and ultimately passed on 10/30/2022 at 5:40 p.m.        ______________________________  DALE SHETH MD    [facility]AN    DD:  10/30/2022 18:34  DT:  10/30/2022 21:07    Job#:  [id number]

## 2022-10-31 NOTE — DISCHARGE PLANNING
PASTOR contacted the spouse and daughter Amanda at [phone] to offer them the Difficult time handbook. PASTOR emailed the handbook to the dtr at [email or web address]. PASTOR also sent her condolences to the family.

## 2022-11-01 ENCOUNTER — APPOINTMENT (OUTPATIENT)
Dept: MEDICAL GROUP | Facility: LAB | Age: 62
End: 2022-11-01
Payer: MEDICARE

## 2022-11-04 ENCOUNTER — APPOINTMENT (OUTPATIENT)
Dept: MEDICAL GROUP | Facility: LAB | Age: 62
End: 2022-11-04
Payer: MEDICARE

## 2022-12-07 NOTE — PROGRESS NOTES
"[facility] Hospitalist Progress Note    Date of Service: 11/25/2018    Chief Complaint  58 y.o. female admitted 11/19/2018 with cough, wheezing and shortness of breath.  Pt has long medical hx including ILD reportedly currently treated by pulmonologist at [facility], s/p gastric bypass, chronic immunosuppression s/p liver transplant, chronic pain syndrome.  On presentation, CXR and clinical presentation indicative of possible bronchitis vs ILD/sarcoidosis \"flare\" so patient started on Roceph/Azithromycin as well as prednisone (refused IV steroids).  Chart review indicates drug seeking bahavior as well.      Pt no clinical decompensation since hospitalization.  Pt has been seen by pulmnologist and discussion held between, [facility] pulm and [facility] pulm.  Agreed that patient doing ok and that no transfer is necessary as of right now although patient is disagreement.      Infectious disease who knows patient well, recommends that Rocephin/Azithromycin x 5 days is sufficient since no airspace infiltrate obvious on CXR.      Interval Problem Update  Pt seen and examined, afebrile,    Still with abdominal pain, but a little better controlled   Still with cough.   Cont with the steroids and IV abx.     Seen also by pulmonology Dr. Lainez     Consultants/Specialty  none    Disposition  Pending clinical course        Review of Systems   Constitutional: Positive for malaise/fatigue.   HENT: Negative for sinus pain and sore throat.    Respiratory: Positive for cough, sputum production, shortness of breath and wheezing. Negative for hemoptysis.    Cardiovascular: Negative for chest pain, palpitations and leg swelling.   Gastrointestinal: Positive for abdominal pain. Negative for blood in stool, constipation, diarrhea, heartburn, nausea and vomiting.   Genitourinary: Negative for dysuria and urgency.   Musculoskeletal: Negative for myalgias and neck pain.   Neurological: Negative for dizziness, focal weakness and headaches. " [Dear  ___] : Dear  [unfilled],   Psychiatric/Behavioral: The patient is nervous/anxious.       Physical Exam  Laboratory/Imaging   Hemodynamics  Temp (24hrs), Av.7 °C (98.1 °F), Min:36.4 °C (97.5 °F), Max:37.1 °C (98.8 °F)   Temperature: 36.4 °C (97.5 °F)  Pulse  Av.9  Min: 50  Max: 89    Blood Pressure: 111/64      Respiratory      Respiration: 18, Pulse Oximetry: 98 %     Work Of Breathing / Effort: Mild  RUL Breath Sounds: Clear, RML Breath Sounds: Diminished, RLL Breath Sounds: Clear, MANJINDER Breath Sounds: Clear, LLL Breath Sounds: Diminished    Fluids    Intake/Output Summary (Last 24 hours) at 18 1020  Last data filed at 18 1800   Gross per 24 hour   Intake              600 ml   Output                0 ml   Net              600 ml       Nutrition  Orders Placed This Encounter   Procedures   • Diet Order Regular     Standing Status:   Standing     Number of Occurrences:   1     Order Specific Question:   Diet:     Answer:   Regular [1]     Physical Exam   Constitutional: She is oriented to person, place, and time. She appears well-developed and well-nourished.   HENT:   Head: Normocephalic and atraumatic.   Eyes: Pupils are equal, round, and reactive to light. Conjunctivae are normal. No scleral icterus.   Neck: Neck supple. No JVD present.   Cardiovascular: Regular rhythm.  Exam reveals no gallop.    Pulmonary/Chest: Effort normal. No respiratory distress. She has wheezes. She has no rales. She exhibits no tenderness.   Coarse   Abdominal: Soft. She exhibits distension and mass. There is tenderness. There is guarding.   Musculoskeletal: Normal range of motion. She exhibits no edema or tenderness.   Neurological: She is alert and oriented to person, place, and time. A cranial nerve deficit is present.   Skin: Skin is warm.   Psychiatric: Her behavior is normal. Thought content normal.   Labile  crying   Nursing note and vitals reviewed.      Recent Labs      18   1235  18   0339  18   0230   WBC  6.0  5.5   [Consult Letter:] : I had the pleasure of evaluating your patient, [unfilled]. "5.4   RBC  3.93*  3.85*  3.62*   HEMOGLOBIN  11.7*  11.8*  10.9*   HEMATOCRIT  35.6*  34.6*  33.2*   MCV  90.6  89.9  91.7   MCH  29.8  30.6  30.1   MCHC  32.9*  34.1  32.8*   RDW  47.0  47.7  47.9   PLATELETCT  96*  99*  99*   MPV  9.3  9.1  9.4     Recent Labs      11/23/18   1235  11/24/18   0339  11/25/18   0230   SODIUM  142  142  141   POTASSIUM  4.4  3.8  4.0   CHLORIDE  110  111  108   CO2  23  24  28   GLUCOSE  136*  90  106*   BUN  14  16  18   CREATININE  0.88  0.75  0.87   CALCIUM  8.8  8.5  8.6                      Assessment/Plan     * Acute and chronic respiratory failure with hypoxia due to bronchitis, ILD flare (HCC)- (present on admission)   Assessment & Plan    -short course of steroids planned  - patient refusing higher dose of steroids or IV steroids on admission  -Continue her on bronchodilators per RT protocol.   - Continue oxygen supplements, keep saturations above 88%.  Continue home dose Spiriva.  -Patient claims that her pulmonologist is calling her, but we have not received any calls from [facility].  Currently, there is no indication to transfer her to a higher level of care.  -spoke with Dr. Gonzalez, pt no current clinical decompensation will c/w current tx  -increased Xanax to qid        Bronchitis- (present on admission)   Assessment & Plan    -Continue IV ceftriaxone and azithromycin, d4  -plan to complete 5 days course of antibiotics, curbside ID done.  -continue w/ duonebs  -wean oxygen to maintain oxygen saturation between 88-92%  Close monitoring        Drug-seeking behavior, with narcotic dependence- (present on admission)   Assessment & Plan    -Continue  IV morphine 4mg every 3 hours PRN.   Monitor      Type 2 diabetes mellitus (HCC)- (present on admission)   Assessment & Plan    -Allegedly was \"cured\" with weight loss.  Has steroid hyperglycemia.   -Start sliding scale insulin coverage.  Anticipate blood sugar will improve with transition to oral prednisone.  Accu-Chek before meals " and at bedtime.     Chronic generalized abdominal pain- (present on admission)   Assessment & Plan    -Continue home dose oxycodone 10 mg  TID.  Continue IV morphine 4 mg every 4 hours PRN.  I highly suspect drug-seeking behavior.  Do not give more narcotics. No acute issues/pathology on CT scan.       Pancytopenia (HCC)- (present on admission)   Assessment & Plan    -Due to liver transplant, and splenomegaly.  Stable. Monitor blood counts closely.     Status post liver transplant Dr. Canada ([facility])- (present on admission)   Assessment & Plan    -cont  outpatient medication  tacrolimus and mycophenolate.  Continue midodrine.       Quality-Core Measures   Reviewed items::  Labs reviewed, Radiology images reviewed and Medications reviewed  Holbrook catheter::  No Holbrook  Antibiotics:  Treating active infection/contamination beyond 24 hours perioperative coverage

## 2023-05-28 NOTE — ASSESSMENT & PLAN NOTE
Patient reports a bulging area on the right side of her incision from her panniculectomy.  She reports it is reducible and is not painful           Start your diet clear liquids advance slowly.  Take medication as directed.  Return for worsening symptoms.  Follow-up with your doctor in 2 days if no better.

## 2023-06-15 NOTE — ED PROVIDER NOTES
"ED Provider Note    Scribed for Frazad Weeks M.D. by Good Mccurdy. 4/27/2018  4:33 PM    Primary Care Provider: Elisa Phillip M.D.  Means of arrival: EMS  History limited by: None    CHIEF COMPLAINT  Chief Complaint   Patient presents with   • Tiredness  Weakness       HPI  Roxana Cuba is a 58 y.o. Female, with a history of Luling's disease, who presents to the ED complaining of tiredness and weakness over the last 3 days. She also describes general \"fogginess,\" congestion, skin bruising, and headache over the past week. She has also had an episode of vomiting 2 days ago. Patient takes 7 mg Prednisone for treatment of her Gerard's a day and she denies any missed dosages. Patient also notes that an at-home blood pressure test earlier today showed a reading of 84/44 while she normally runs at a 115 systolic.  Her last surgery was on 3/9/18 in order to treat irregular aspirations and she reports 10 lbs of weight loss during recovery.   Patient denies cough, ear pain, changing abdominal pain, hematemesis, polyuria, dysuria, changing diarrhea, depression, chest pain, palpitations.     REVIEW OF SYSTEMS    CONSTITUTIONAL:  Confirms weight loss, weakness, tiredness, \"fogginess.\"  ENT:  Confirms congestion. Runny nose. Denies ear pain.  Head: Denies a headache currently.  CARDIOVASCULAR:  Denies chest pain, palpitations.  RESPIRATORY:  Denies cough congestion or shortness of breath..  GI:  Denies changing abdominal pain, hematemesis, changing diarrhea.  : Denies polyuria or dysuria.  MUSCULOSKELETAL:  Confirms weakness generally. No neck or back pain or joint pain  SKIN:  Positive for bruising.  NEUROLOGIC:  Confirms headache, focal weakness, left hand numbness.  PSYCHIATRIC:  Denies depression.    C.    PAST MEDICAL HISTORY  Past Medical History:   Diagnosis Date   • Breath shortness    • Bronchitis      2016   • Cardiomegaly    • Chronic fatigue and malaise    • Cirrhosis (HCC) December 2011    Status post " DATE OF OPERATION: 6/12/2023     SURGEON: Ace Shaw M.D.    ANESTHESIOLOGIST: Anesthesiologist: Tony Ramírez M.D.    ANESTHESIA: sedation with local    SURGICAL FIRST ASST: none    PREOPERATIVE DIAGNOSIS: Right thumb infection, right thumb osteomyelitis     POSTOPERATIVE DIAGNOSIS:   Right thumb infection, right thumb osteomyelitis     PROCEDURE:   Right thumb irrigation and excisional debridement down to bone of left thumb, wound size 1cm x 1cm  Right thumb bone biopsy      DETAILS OF PROCEDURE: Timeout confirmed patient, laterality, and procedure. Hand was prepped and draped. Tourniquet applied to the forearm was inflated. A stab incision was made at the thumb pulp on the radial side. Immediate purulence was encountered and cultured. Hemostat was used to dissect bluntly down to the distal phalanx and portion of this was sent for biopsy. We then made a small stab incision over the dorsum of the proximal hallux and released a small pocket of purulence here. The finger was then irrigated well, wounds were left open, and soft dressing was placed.     COMPLICATIONS:none    POST OP PLAN:   5lb weight bearing  Follow cultures and biopsy  ID consult  Dakins soaks 2-3 times daily   ____________________________________   Ace Shaw M.D.      "liver transplant at [facility]   • CKD (chronic kidney disease) stage 3, GFR 30-59 ml/min    • Diabetes (CMS-HCC)     reports hx of, resolved w/weight loss. Reports still checking bloodsugars twice daily and using insulin PRN   • Fracture of left foot    • GERD (gastroesophageal reflux disease)         • H/O Clostridium difficile infection 02-17-17    reports \"16 months ago\". Current stool sample 01-26-17 neg   • Hypothyroid    • Low back pain 02-17-17    and left foot, 7/10   • Mild aortic regurgitation and aortic valve sclerosis     • On home oxygen therapy     4 liters, Dr. Gaming   • Platelet disorder ([facility])     low platelets   • Pneumonia     aspiration,    • Psychiatric disorder     Mood disorder   • Pulmonary hypertension ([facility])        • S/P cholecystectomy    • Small bowel obstruction ([facility])     01-   • Splenomegaly    • VRE (vancomycin-resistant Enterococci)     02-17-17, pt declines knowledge of this       FAMILY HISTORY  Family History   Problem Relation Age of Onset   • Other Father      Unknown (dead 10 years)   • Diabetes Father    • Heart Disease Father    • Hypertension Father    • Hyperlipidemia Father    • Stroke Father    • Non-contributory Mother    • Hyperlipidemia Mother    • Alcohol/Drug Mother    • Cancer Paternal Aunt    • Alcohol/Drug Maternal Grandmother    • Alcohol/Drug Maternal Grandfather        SOCIAL HISTORY   reports that she has never smoked. She has never used smokeless tobacco. She reports that she does not drink alcohol or use drugs.    SURGICAL HISTORY  Past Surgical History:   Procedure Laterality Date   • OTHER  12/11/2017    paniculectomy   • COLONOSCOPY N/A 3/27/2017    Procedure: COLONOSCOPY;  Surgeon: Osman Matt M.D.;  Location: SURGERY SAME DAY [facility];  Service:    • GASTROSCOPY N/A 3/27/2017    Procedure: GASTROSCOPY;  Surgeon: Osman Matt M.D.;  Location: SURGERY SAME DAY [facility];  Service:    • BREAST BIOPSY Left 2/21/2017    Procedure: " BREAST BIOPSY - WIRE LOCALIZED EXCISONAL ;  Surgeon: Jane Zhao M.D.;  Location: SURGERY SAME DAY [facility];  Service:    • LUNG BIOPSY OPEN  11/2016   • OTHER ABDOMINAL SURGERY      Gasric Sleeve   • BONE MARROW ASPIRATION  3/16/2015    Performed by Marlena Hi M.D. at ENDOSCOPY [facility]   • BONE MARROW BIOPSY, NDL/TROCAR  3/16/2015    Performed by Marlena Hi M.D. at ENDOSCOPY [facility]   • RECOVERY  3/31/2014    Performed by Ir-Recovery Surgery at SURGERY [facility]   • RECOVERY  3/24/2014    Performed by Cath-Recovery Surgery at SURGERY SAME DAY [facility]   • RECOVERY  1/21/2014    Performed by Ir-Recovery Surgery at SURGERY SAME DAY [facility]   • BRONCHOSCOPY-ENDO  11/15/2013    Performed by Ha Perez M.D. at ENDOSCOPY [facility]   • RECOVERY  2/27/2013    Performed by Ir-Recovery Surgery at SURGERY SAME DAY [facility]   • BONE MARROW ASPIRATION  12/31/2012    Performed by Josemanuel Real M.D. at ENDOSCOPY [facility]   • BONE MARROW BIOPSY, NDL/TROCAR  12/31/2012    Performed by Josemanuel Real M.D. at ENDOSCOPY [facility]   • GASTROSCOPY  9/28/2012    Performed by Aravind Richards M.D. at SURGERY [facility]   • SIGMOIDOSCOPY FLEX  9/26/2012    Performed by Kristopher Cardoso M.D. at ENDOSCOPY [facility]   • BRONCHOSCOPY-ENDO  6/19/2012    Performed by MARLENA MURILLO at ENDOSCOPY [facility]   • BRONCHOSCOPY-ENDO  5/29/2012    Performed by SUYAPA CAMARENA at ENDOSCOPY [facility]   • OTHER ABDOMINAL SURGERY  December 2011    Liver transplant at [facility] by Dr. Canada.   • GASTROSCOPY-ENDO  3/12/2010    Performed by CAMELIA SAMANO at ENDOSCOPY [facility]   • EXAM UNDER ANESTHESIA  11/11/2009    Performed by BIRD ABDI at SURGERY [facility]   • HEMORRHOIDECTOMY  11/11/2009    Performed by BIRD ABDI at SURGERY [facility]   • KELBY BY LAPAROSCOPY  9/19/2009    Performed by BIRD ABDI at  SURGERY [facility]   • CARPAL TUNNEL RELEASE          • GASTRIC BYPASS LAPAROSCOPIC     • HYSTERECTOMY, TOTAL ABDOMINAL          • OTHER      Breast reduction   • PB ANESTH,NOSE,SINUS SURGERY     • TONSILLECTOMY         CURRENT MEDICATIONS    Current Outpatient Prescriptions:   •  GLUCAGON EMERGENCY 1 MG Kit, USE 1 KIT BY INJECTION ROUTE AS NEEDED., Disp: 1 Kit, Rfl: 2  •  FREESTYLE LITE strip, 1 Strip by Other route 2 Times a Day., Disp: 100 Strip, Rfl: 1  •  HYDROXYPROPYL METHYLCELLULOSE 0.3 % Gel, by Ophthalmic route., Disp: , Rfl:   •  MOVANTIK 25 MG Tab, TAKE 25 MG BY MOUTH EVERY DAY., Disp: , Rfl: 1  •  Calcium Citrate-Vitamin D 315-250 MG-UNIT Tab, Take  by mouth., Disp: , Rfl:   •  Wheat Dextrin (BENEFIBER) Powder, Take  by mouth., Disp: , Rfl:   •  SPIRIVA HANDIHALER 18 MCG Cap, INHALE 1 CAP BY MOUTH DAILY., Disp: 30 Cap, Rfl: 6  •  predniSONE (DELTASONE) 5 MG Tab, TAKE 1 TO 4 TABLETS BY MOUTH EVERY DAY AS DIRECTED, Disp: 120 Tab, Rfl: 1  •  gabapentin (NEURONTIN) 600 MG tablet, Take 1 Tab by mouth 3 times a day., Disp: 90 Tab, Rfl: 2  •  predniSONE (DELTASONE) 1 MG Tab, TAKE 1 TO 4 TABS BY MOUTH DAILY AS DIRECTED., Disp: 90 Tab, Rfl: 5  •  ALPRAZolam (XANAX) 1 MG Tab, Take 1 Tab by mouth 3 times a day for 90 days., Disp: 90 Tab, Rfl: 2  •  omeprazole (PRILOSEC) 40 MG delayed-release capsule, Take 1 Cap by mouth every day., Disp: 90 Cap, Rfl: 0  •  aspirin 81 MG tablet, Take 1 Tab by mouth every day., Disp: 90 Tab, Rfl: 0  •  lactulose 10 GM/15ML Solution, Take 30 mL by mouth 2 times a day. (Patient taking differently: Take 30 mL by mouth every day.), Disp: 1 Bottle, Rfl: 3  •  polyethylene glycol 3350 (MIRALAX) Powder, Take 17 g by mouth every day., Disp: , Rfl:   •  docusate sodium (COLACE) 100 MG Cap, Take 100 mg by mouth 2 times a day as needed for Constipation., Disp: , Rfl:   •  sennosides (SENOKOT) 8.6 MG Tab, Take 8.6 mg by mouth 1 time daily as needed (constipation)., Disp: , Rfl:   •   Carboxymethylcell-Hypromellose (GENTEAL) 0.25-0.3 % Gel, Place 0.3 mg in both eyes every day at 6 PM., Disp: , Rfl:   •  non-formulary med, Inhale 5 L/min by mouth Continuous. oxygen x nasal cannula, Disp: , Rfl:   •  predniSONE (DELTASONE) 5 MG Tab, Take 5 mg by mouth every day. Total dose = 7 mg, Disp: , Rfl:   •  furosemide (LASIX) 40 MG Tab, TAKE 1 TAB BY MOUTH EVERY DAY., Disp: 30 Tab, Rfl: 6  •  Linaclotide (LINZESS) 290 MCG Cap, Take 1 Cap by mouth every day., Disp: 90 Cap, Rfl: 3  •  bisacodyl (DULCOLAX) 10 MG Suppos, Insert 1 Suppository in rectum 1 time daily as needed (constipation)., Disp: 30 Suppository, Rfl: 3  •  trazodone (DESYREL) 50 MG Tab, TAKE 1-2 TABS BY MOUTH EVERY BEDTIME., Disp: 180 Tab, Rfl: 3  •  sertraline (ZOLOFT) 100 MG Tab, Take 1 Tab by mouth every bedtime., Disp: 90 Tab, Rfl: 4  •  fluticasone (FLONASE) 50 MCG/ACT nasal spray, SPRAY 1 SPRAY IN EACH NOSTRIL TWICE A DAY, Disp: 48 g, Rfl: 3  •  levothyroxine (SYNTHROID) 137 MCG Tab, Take 1 Tab by mouth Every morning on an empty stomach., Disp: 90 Tab, Rfl: 3  •  morphine (MS IR) 15 MG tablet, Take 1 Tab by mouth every four hours as needed for Severe Pain. (Patient not taking: Reported on 3/17/2018), Disp: 30 Tab, Rfl: 0  •  NON SPECIFIED, Take 1 Cap by mouth every evening. Bariatric Dietary Supplment , Disp: , Rfl:   •  tacrolimus (PROGRAF) 0.5 MG Cap, Take 0.5 mg by mouth 2 Times a Day. Takes with 1 mg tacrolimus BID, Disp: , Rfl:   •  ambrisentan (LETAIRIS) 10 MG tablet, Take 1 Tab by mouth every day., Disp: 30 Tab, Rfl: 11  •  pravastatin (PRAVACHOL) 20 MG Tab, TAKE 1 TAB BY MOUTH EVERY DAY., Disp: 30 Tab, Rfl: 11  •  Tadalafil, PAH, (ADCIRCA) 20 MG Tab, Take 40 mg by mouth every bedtime. Indications: Pulmonary Arterial Hypertension, Disp: 180 Tab, Rfl: 3  •  cyclobenzaprine (FLEXERIL) 10 MG Tab, Take 10 mg by mouth 3 times a day as needed for Muscle Spasms., Disp: , Rfl:   •  ferrous sulfate (FEOSOL) 220 (44 FE) MG/5ML Elixir, Take 5  "mL by mouth every day., Disp: 1 Bottle, Rfl: 10  •  CITRACAL MAXIMUM 315-250 MG-UNIT Tab, TAKE 2 TABS BY MOUTH 2X/ DAY WITH FOOD BEFORE AND AFTER DINNER FOR LIFE-CRUSH 1ST 3 MONTH, SPACE MVI, Disp: 120 Tab, Rfl: 11  •  ascorbic acid (ASCORBIC ACID) 500 MG Tab, Take 500 mg by mouth every day., Disp: , Rfl:   •  ondansetron (ZOFRAN ODT) 4 MG TABLET DISPERSIBLE, Take 1 Tab by mouth every 8 hours as needed for Nausea/Vomiting. Nausea and vomiting, Disp: 270 Tab, Rfl: 4  •  mycophenolate (CELLCEPT) 250 MG Cap, Take 500 mg by mouth 2 times a day., Disp: , Rfl:      ALLERGIES  Allergies   Allergen Reactions   • Rhubarb Anaphylaxis   • Organic Nitrates      Nitroglycerin products should be avoided with the use of PDE-5 inhibitors as the combination can result in severe hypotension.   • Vancomycin Hcl      Causes red man syndrome when infused to fast         PHYSICAL EXAM  VITAL SIGNS: BP (!) 95/47   Pulse (!) 55   Temp 36.8 °C (98.2 °F)   Resp (!) 21   Ht 1.727 m (5' 8\")   Wt 75.4 kg (166 lb 3.2 oz)   SpO2 95%   BMI 25.27 kg/m²      Constitutional: Patient is awake and alert. No acute respiratory distress. Positive tired, worn out looking.  HENT: Normocephalic, Atraumatic, Bilateral external ears normal, Oropharynx pink and very dry with no exudates, Nose patent.  Eyes: PERRLA, EOMI, Sclera and conjunctiva clear, No discharge.   Neck:  Supple no nuchal rigidity, no thyromegaly or mass. Non-tender  Lymphatic: No supraclavicular lymph nodes.   Cardiovascular: Heart is bradycardic rate and rhythm no murmur, rub or thrill. Distant heart sounds.  Thorax & Lungs: Chest is symmetrical, with good breath sounds. No wheezing. Crackles at left base. No respiratory distress, No chest tenderness.   Abdomen: Soft, No tenderness no hepatosplenomegaly there is no guarding or rebound, No masses, No pulsatile masses.  multiple scars to the abdomen.  Skin: Warm, Dry, no petechia, purpura, or rash. Multiple scars on lower abdomen, none " look infected or dehiscent. Chronic venostasis changes in legs. Bruise in left anterior ankle, left shin, right shin, and arms.   Back: Non tender with palpation, No CVA tenderness.   Extremities: No edema. Non tender.   Musculoskeletal: Good range of motion to wrists, elbows, shoulders, hips, knees, and ankles. Pulses 2+ radially and femorally. No gross deformities noted.   Neurologic: Alert & oriented to person, time, and place.  Strength is 5 over 5 and symmetric in bilateral upper and lower extremities.  Sensory is intact to light touch to face, arms, and legs.  DTRs are symmetrical in biceps brachioradialis, patella and Achilles.   Psychiatric: Normal affect    EKG  1803- 13 Lead EKG interpreted by me shows sinus bradycardia at 49.  . Axis QRS 14, No ST elevations. No prior EKG    LABS  Results for orders placed or performed during the hospital encounter of 04/27/18   LACTIC ACID   Result Value Ref Range    Lactic Acid 1.4 0.5 - 2.0 mmol/L   CBC WITH DIFFERENTIAL   Result Value Ref Range    WBC 2.9 (L) 4.8 - 10.8 K/uL    RBC 3.85 (L) 4.20 - 5.40 M/uL    Hemoglobin 11.1 (L) 12.0 - 16.0 g/dL    Hematocrit 35.1 (L) 37.0 - 47.0 %    MCV 91.2 81.4 - 97.8 fL    MCH 28.8 27.0 - 33.0 pg    MCHC 31.6 (L) 33.6 - 35.0 g/dL    RDW 45.5 35.9 - 50.0 fL    Platelet Count 76 (L) 164 - 446 K/uL    MPV 9.7 9.0 - 12.9 fL    Neutrophils-Polys 66.00 44.00 - 72.00 %    Lymphocytes 24.20 22.00 - 41.00 %    Monocytes 6.80 0.00 - 13.40 %    Eosinophils 2.00 0.00 - 6.90 %    Basophils 0.70 0.00 - 1.80 %    Immature Granulocytes 0.30 0.00 - 0.90 %    Nucleated RBC 0.00 /100 WBC    Neutrophils (Absolute) 1.93 (L) 2.00 - 7.15 K/uL    Lymphs (Absolute) 0.71 (L) 1.00 - 4.80 K/uL    Monos (Absolute) 0.20 0.00 - 0.85 K/uL    Eos (Absolute) 0.06 0.00 - 0.51 K/uL    Baso (Absolute) 0.02 0.00 - 0.12 K/uL    Immature Granulocytes (abs) 0.01 0.00 - 0.11 K/uL    NRBC (Absolute) 0.00 K/uL   COMP METABOLIC PANEL   Result Value Ref Range     Sodium 141 135 - 145 mmol/L    Potassium 3.9 3.6 - 5.5 mmol/L    Chloride 104 96 - 112 mmol/L    Co2 28 20 - 33 mmol/L    Anion Gap 9.0 0.0 - 11.9    Glucose 107 (H) 65 - 99 mg/dL    Bun 14 8 - 22 mg/dL    Creatinine 1.27 0.50 - 1.40 mg/dL    Calcium 8.7 8.5 - 10.5 mg/dL    AST(SGOT) 19 12 - 45 U/L    ALT(SGPT) 13 2 - 50 U/L    Alkaline Phosphatase 24 (L) 30 - 99 U/L    Total Bilirubin 0.3 0.1 - 1.5 mg/dL    Albumin 4.0 3.2 - 4.9 g/dL    Total Protein 6.1 6.0 - 8.2 g/dL    Globulin 2.1 1.9 - 3.5 g/dL    A-G Ratio 1.9 g/dL   URINALYSIS   Result Value Ref Range    Color Yellow     Character Clear     Specific Gravity 1.008 <1.035    Ph 5.5 5.0 - 8.0    Glucose Negative Negative mg/dL    Ketones Negative Negative mg/dL    Protein Negative Negative mg/dL    Bilirubin Negative Negative    Urobilinogen, Urine 0.2 Negative    Nitrite Negative Negative    Leukocyte Esterase Small (A) Negative    Occult Blood Negative Negative    Micro Urine Req Microscopic    TROPONIN   Result Value Ref Range    Troponin I <0.01 0.00 - 0.04 ng/mL   BTYPE NATRIURETIC PEPTIDE   Result Value Ref Range    B Natriuretic Peptide 74 0 - 100 pg/mL   APTT   Result Value Ref Range    APTT 27.8 24.7 - 36.0 sec   CORTISOL   Result Value Ref Range    Cortisol 1.3 0.0 - 23.0 ug/dL   PROTHROMBIN TIME   Result Value Ref Range    PT 12.9 12.0 - 14.6 sec    INR 1.00 0.87 - 1.13   TSH   Result Value Ref Range    TSH 0.230 (L) 0.380 - 5.330 uIU/mL   FREE THYROXINE   Result Value Ref Range    Free T-4 1.12 0.53 - 1.43 ng/dL   ESTIMATED GFR   Result Value Ref Range    GFR If  52 (A) >60 mL/min/1.73 m 2    GFR If Non  43 (A) >60 mL/min/1.73 m 2   URINE MICROSCOPIC (W/UA)   Result Value Ref Range    WBC 0-2 /hpf    RBC 2-5 (A) /hpf    Bacteria Negative None /hpf    Epithelial Cells Negative /hpf    Hyaline Cast 0-2 /lpf     *Note: Due to a large number of results and/or encounters for the requested time period, some results have not  been displayed. A complete set of results can be found in Results Review.      All labs reviewed by me.    RADIOLOGY/PROCEDURES  DX-CHEST-PORTABLE (1 VIEW)   Final Result         1.  Perihilar opacifications could be due to congestion edema bronchitis or bronchopneumonia.      2.  Probable minimal left pleural effusion.      3.  No focal consolidations identified.        The radiologist's interpretations of all radiological studies have been reviewed by me.     COURSE & MEDICAL DECISION MAKING  Pertinent Labs & Imaging studies reviewed. (See chart for details)    Differential diagnoses include but are not limited to adrenal crisis, sepsis, pneumonia, aortic tract infection, medication side effects, electrolyte imbalances, dehydration    4:33 PM - Patient seen and examined at bedside. Informed patient that sepsis protocol will be initiated and she will require admission to the hospital. Ordered DX chest, lactate, troponin, BNP, APTT, cortisol, prothrombin time, CBC, CMP, urinalysis, urine culture, blood culture, and EKG.  Patient will be medicated with decadron 10 mg IV and an NS bolus for possible sepsis.     4:55 PM Spoke with Dr. Gardiner, hospitalist, regarding the above case findings. He agrees to admit patient to the ICU at this time. He recommends starting the patient on steroids and holding off on antibiotics until a source of infection is found. Paged for pulmonology.     5:00 PM Patient is requesting pain medication for her chronic pain. Will treat her with Zofran 4 mg IV and Morphine 1 mg IV. Her blood pressure is currently 95 systolic.     5:05 PM Consulted with Dr. Gonda, pulmonology, regarding the patient's case.     CRITICAL CARE  The very real possibilty of a deterioration of this patient's condition required the highest level of my preparedness for sudden, emergent intervention.  I provided critical care services, which included medication orders, frequent reevaluations of the patient's condition  and response to treatment, ordering and reviewing test results, and discussing the case with various consultants.  The critical care time associated with the care of the patient was 35 minutes. Review chart for interventions. This time is exclusive of any other billable procedures.  Patient has a real risk of death or deterioration    Decision Making  Patient presents to emergency department after several days of feeling ill and episode of vomiting. Appears to be in adrenal crisis with hypotension dehydration weakness. I started sepsis protocol on her. We gave her 30 mL/kg of saline. The urine gave her 10 mg of Decadron IV. Her blood pressure seemed to improve with the fluids and the steroids. [facility] as written orders. Initially we talked about antibiotics but we wanted to wait to see what the final readings of the urine and x-rays were to help guide us with antibiotic therapy. Again the patient is critically ill with some probable underlying infectious cause but now with her adrenal insufficiency ablations going to adrenal crisis as well. She will be admitted to the intensive care unit [facility] and [facility] specialists as well  And the patient is given IV fluids because of her dehydration and hypotension seemed to help somewhat while she received the fluids      DISPOSITION:  Patient will be admitted to Dr. Gardiner in guarded condition.      FINAL IMPRESSION  1. Adrenal crisis (HCC)    2. Hypotensive episode    3. Dehydration      The critical care time associated with the care of the patient was 35 minutes.    PLAN  1. Admission ICU  2. IV fluid administration steroid administration and continue workup for source of infections and other causes of her acute illness       IGood (Scribsujey), am scribing for, and in the presence of, Farzad Weeks M.D..    Electronically signed by: Good Mccurdy  (Scribe), 4/27/2018    IFarzad M.D. personally performed the services described in this documentation, as scribed by Good Mccurdy in my presence, and it is both accurate and complete.    The note accurately reflects work and decisions made by me.  Farzad Weeks  4/27/2018  9:46 PM

## 2023-11-25 NOTE — DISCHARGE PLANNING
Care Transition Team Assessment    Information Source  Orientation : Oriented x 4  Information Given By: Patient  Who is responsible for making decisions for patient? : Patient         Elopement Risk  Legal Hold: No  Ambulatory or Self Mobile in Wheelchair: Yes  Disoriented: No  Psychiatric Symptoms: None  History of Wandering: No  Elopement this Admit: No  Vocalizing Wanting to Leave: No  Displays Behaviors, Body Language Wanting to Leave: No-Not at Risk for Elopement  Elopement Risk: Not at Risk for Elopement    Interdisciplinary Discharge Planning  Does Admitting Nurse Feel This Could be a Complex Discharge?: No  Primary Care Physician: Dr. Phillip  Lives with - Patient's Self Care Capacity: Spouse  Patient or legal guardian wants to designate a caregiver (see row info): No  Support Systems: Spouse / Significant Other  Housing / Facility: 1 Story House (but has 13 steps to get in )  Do You Take your Prescribed Medications Regularly: Yes  Able to Return to Previous ADL's: Yes  Mobility Issues: No  Prior Services: None  Patient Expects to be Discharged to:: Home  Assistance Needed: No  Durable Medical Equipment: Home Oxygen, Walker, Commode  DME Provider / Phone: from [employer]    Discharge Preparedness  What is your plan after discharge?: Home with help  What are your discharge supports?: Spouse  Prior Functional Level: Ambulatory, Independent with Activities of Daily Living  Difficulity with ADLs: None  Difficulity with IADLs: None    Functional Assesment  Prior Functional Level: Ambulatory, Independent with Activities of Daily Living    Finances  Financial Barriers to Discharge: No  Prescription Coverage: Yes    Vision / Hearing Impairment  Vision Impairment : No  Hearing Impairment : No    Values / Beliefs / Concerns  Values / Beliefs Concerns : No         Domestic Abuse  Have you ever been the victim of abuse or violence?: No  Physical Abuse or Sexual Abuse: No  Verbal Abuse or Emotional Abuse: No  Possible  Abuse Reported to:: Not Applicable              Anticipated Discharge Information  Anticipated discharge disposition: Home         Home

## 2024-05-25 NOTE — ED NOTES
PIV attempted by 3 staff members and unable to obtain. MD made aware and is okay with holding off on IV and NS infusion. Dilaudid changed to IM and was given.   25-May-2024 04:59

## 2024-06-05 NOTE — ED NOTES
Dear Mr. Hardin,    As you have already seen your blood work results are stable.  Please schedule a Medicare wellness visit with us at your convenience.    Best,    Dr. Mccain. Pt back from CT scan , awaiting results, updated on plan of care

## 2024-07-06 NOTE — ASSESSMENT & PLAN NOTE
- On 3 L O2 chronically, has needed to increase it to 5 over the past few days.  Requiring 4 L on my evaluation  - Review of records indicate no pulmonary sarcoid on lung biopsy, O2 requirement largely from hepatopulmonary syndrome  - Temperature at home 100.2, afebrile here   - Immunosuppressed due to history of liver transplant  - Ultrasound lower extremity negative for DVT  - Symptoms most likely due to volume overload  - CTA negative for PTE, has small effusions, pericardial effusion (not hemodynamically significant on echo), mild pulmonary edema  - Notes from [facility] indicate that she is to be on 5L while in [place] due to the altitude, though has been on 3L while hospitalized here in the past  - Had recently held her Lasix due to hypokalemia   Patient discharged. IV removed, telemetry box and leads removed and returned. Lockbox emptied. All belongings gathered and returned to patient. Discharge instructions reviewed with patient, all questions answered by RN. Medications picked up from outpatient pharmacy / prescriptions sent with patient.  No further needs.

## 2025-03-09 NOTE — PROGRESS NOTES
Patient discharged 4/30. IHD visited patient at bedside but she adamantly declined IHD services. Patient advocate attempted an outreach call and was unsuccessful. The patient has future appointments scheduled with PCP and Cardiology.   Unclear cause. Also with anemia. May be related to metastatic cancer that may have gone to bone marrow. Not present in the past vs. viral covid19  - Monitor for improvement daily  - Appreciate heme/onc  - brain lesion on prior MRI?  - heme/onc following.  - 1 unit prbc 3/2/25, monitor hgb. stable.
